# Patient Record
Sex: MALE | Race: WHITE | Employment: OTHER | URBAN - METROPOLITAN AREA
[De-identification: names, ages, dates, MRNs, and addresses within clinical notes are randomized per-mention and may not be internally consistent; named-entity substitution may affect disease eponyms.]

---

## 2017-12-15 ENCOUNTER — OFFICE VISIT (OUTPATIENT)
Dept: NEUROLOGY | Age: 62
End: 2017-12-15

## 2017-12-15 VITALS
SYSTOLIC BLOOD PRESSURE: 170 MMHG | RESPIRATION RATE: 18 BRPM | TEMPERATURE: 99.3 F | HEIGHT: 69 IN | BODY MASS INDEX: 38.36 KG/M2 | OXYGEN SATURATION: 97 % | WEIGHT: 259 LBS | DIASTOLIC BLOOD PRESSURE: 100 MMHG | HEART RATE: 81 BPM

## 2017-12-15 DIAGNOSIS — G47.33 OSA TREATED WITH BIPAP: Primary | ICD-10-CM

## 2017-12-15 DIAGNOSIS — I10 ESSENTIAL HYPERTENSION: ICD-10-CM

## 2017-12-15 NOTE — PROGRESS NOTES
[facility]             [address] [facility], [address] [facility], [address] [place] [address]      Yordan Parker is left handed [facility]  male who presents in evaluation of sleep disorder. Patient describes mid adult years  Onset of daytime sleepiness. He denies current difficulties  With ess=0; He reports daily usage of best device ever invented . He has lost weight     Patient describes no respiratory difficulties including apneas,snoring or gasping,no sleep disturbances  Patient reports his PCP has told him his blood pressures not a problem but patient continues to have markedly elevated pressures in this office discussed with him need to follow-up with PCP    Patient reports that he still feels it is a very good machine. His weight is stable he has retired download is provided without AHI but shows excellent usage on a mean of 8 hours 51 minutes-  Ess=1    Current Outpatient Prescriptions:     ALLOPURINOL PO, Take 300 mg by mouth daily. , Disp: , Rfl:     GEMFIBROZIL PO, Take 600 mg by mouth two (2) times a day., Disp: , Rfl:     OLMESARTAN MED/AMLODIPINE/HCTZ (TRIBENZOR PO), Take  by mouth See Admin Instructions. Indications: 40/10/25mg daily, Disp: , Rfl:     potassium chloride (KLOR-CON M20) 20 mEq tablet, Take  by mouth two (2) times a day., Disp: , Rfl:   Past Medical History:   Diagnosis Date    Essential hypertension     Sleep apnea      Former naren    Review of Systems:   Constitutional:  lost,  13  lbs. over past year   Eyes:  Negative blurred vision  CVS:  Negative chest pain  Pulm:  Negative shortness of breath  GI:  Negative nausea or vomiting  :  Negative nocturia  Musculoskeletal:  Negative significant joint pain at night  Skin:  Negative rashes  Neuro:  Negative dizziness   Psych:  Negative significant mood issues    Sleep Review of Systems: notable for Negative difficulty falling asleep;  Negative awakenings at night; Negative percieved regular dreaming; Negative nightmares; Negative early morning headaches; 0 afternoon naps per week; Negative memory problems; Negative concentration issues; Negative history of any automobile or occupational accidents due to daytime drowsiness. Physical Exam    Visit Vitals    BP (!) 170/100    Pulse 81    Temp 99.3 °F (37.4 °C) (Oral)    Resp 18    Ht 5' 9\" (1.753 m)    Wt 117.5 kg (259 lb)    SpO2 97%    BMI 38.25 kg/m2       General:  Well defined, nourished, and groomed individual in no acute distress. Neck: Supple, nontender, thyroid within normal limits, no JVD, no bruits, no pain   with resistance to active range of motion. Narrowing of post orpharynx ,tonsils uvula absent  Heart: Regular rate and rhythm, no murmurs, rub, or gallop. Normal S1S2. Lungs:  Clear to auscultation   Musculoskeletal:  Extremities revealed no edema, clubbing or cyanosis. Psych:  Good mood and normal affect    Neurologic Exam    Mental Status: The patient is awake, alert, and oriented x 3. Speech is fluent and memory appears to be intact, both long and short term. No aphasias or apraxias. Cranial Nerves: II - Visual fields are full to confrontation. Pupils are both equal and reactive to light and accommodation. III, IV, VI - Extraocular movements are intact and there is no nystagmus. V - Facial sensation is intact to pinprick and light touch. VII - Face is symmetrical.   VIII - Hearing is present. Motor: Tone is normal and symmetric. There is no pronator drift present. The strength is intact for all muscle groups tested in all four extremities. Coordination. Gait testing is normal     Downloadaverage use 8 hours 51 min no ahi  Assessment and Brianna Vallejoarely Esquivel [address] is a 60 y.o. left handed male whose history and physical are consistent with obstructive sleep apnea well controlled on auto bipap.   Katie Mcgarry who has risk factors including  Obesity, hypertension     Diagnoses and all orders for this visit:    1. STEFANIE treated with BiPAP    2. Essential hypertension    3. BMI 38.0-38.9,adult      Follow-up Disposition:  Return in about 1 year (around 12/15/2018). Reviewed  Need for download continue bipap at current settings Patient to continue to monitor bp and report findings to pcp since high when measured three time I spent 25 minutes with the patient in face-to-face consultation, of which greater than 50% was spent in counseling and coordination of care as described above.

## 2017-12-15 NOTE — MR AVS SNAPSHOT
Visit Information Date & Time Provider Department Dept. Phone Encounter #  
 12/15/2017  3:00 PM Kieran Constantino MD [facility] [address] at [address] [place] [address] [id number] Follow-up Instructions Return in about 1 year (around 12/15/2018). Follow-up and Disposition History Upcoming Health Maintenance Date Due Hepatitis C Screening 1955 DTaP/Tdap/Td series (1 - Tdap) 6/1/1975 FOBT Q 1 YEAR AGE 50-75 6/1/2004 ZOSTER VACCINE AGE 60> 4/1/2014 Influenza Age 5 to Adult 8/1/2017 Allergies as of 12/15/2017  Review Complete On: 12/15/2017 By: Ramya Johnson LPN No Known Allergies Current Immunizations  Never Reviewed No immunizations on file. Not reviewed this visit You Were Diagnosed With   
  
 Codes Comments STEFANIE treated with BiPAP    -  Primary ICD-10-CM: G47.33 
ICD-9-CM: 327.23 Essential hypertension     ICD-10-CM: I10 
ICD-9-CM: 401.9 BMI 38.0-38.9,adult     ICD-10-CM: W32.01 
ICD-9-CM: V85.38 Vitals BP Pulse Temp Resp Height(growth percentile) Weight(growth percentile) (!) 170/100 81 99.3 °F (37.4 °C) (Oral) 18 5' 9\" (1.753 m) 259 lb (117.5 kg) SpO2 BMI Smoking Status 97% 38.25 kg/m2 Former Smoker Vitals History BMI and BSA Data Body Mass Index Body Surface Area  
 38.25 kg/m 2 2.39 m 2 Your Updated Medication List  
  
   
This list is accurate as of: 12/15/17  3:11 PM.  Always use your most recent med list. ALLOPURINOL PO Take 300 mg by mouth daily. GEMFIBROZIL PO Take 600 mg by mouth two (2) times a day. KLOR-CON M20 20 mEq tablet Generic drug:  potassium chloride Take  by mouth two (2) times a day. TRIBENZOR PO Take  by mouth See Admin Instructions. Indications: 40/10/25mg daily Follow-up Instructions Return in about 1 year (around 12/15/2018). Introducing [facility]! Ruddy Newman introduces [employer] patient portal. Now you can access parts of your medical record, email your doctor's office, and request medication refills online. 1. In your internet browser, go to [email or web address] 2. Click on the First Time User? Click Here link in the Sign In box. You will see the New Member Sign Up page. 3. Enter your [employer] Access Code exactly as it appears below. You will not need to use this code after youve completed the sign-up process. If you do not sign up before the expiration date, you must request a new code. · [employer] Access Code: [email or web address] Expires: 3/15/2018  2:34 PM 
 
4. Enter the last four digits of your Social Security Number (xxxx) and Date of Birth (mm/dd/yyyy) as indicated and click Submit. You will be taken to the next sign-up page. 5. Create a [employer] ID. This will be your [employer] login ID and cannot be changed, so think of one that is secure and easy to remember. 6. Create a [employer] password. You can change your password at any time. 7. Enter your Password Reset Question and Answer. This can be used at a later time if you forget your password. 8. Enter your e-mail address. You will receive e-mail notification when new information is available in [address] 9. Click Sign Up. You can now view and download portions of your medical record. 10. Click the Download Summary menu link to download a portable copy of your medical information. If you have questions, please visit the Frequently Asked Questions section of the [employer] website. Remember, [employer] is NOT to be used for urgent needs. For medical emergencies, dial 911. Now available from your iPhone and Android! Please provide this summary of care documentation to your next provider. Your primary care clinician is listed as Jr Murray. If you have any questions after today's visit, please call [phone].

## 2019-02-08 ENCOUNTER — OFFICE VISIT (OUTPATIENT)
Dept: NEUROLOGY | Age: 64
End: 2019-02-08

## 2019-02-08 VITALS
TEMPERATURE: 99.1 F | OXYGEN SATURATION: 98 % | RESPIRATION RATE: 18 BRPM | BODY MASS INDEX: 35.31 KG/M2 | HEART RATE: 82 BPM | WEIGHT: 238.4 LBS | DIASTOLIC BLOOD PRESSURE: 60 MMHG | HEIGHT: 69 IN | SYSTOLIC BLOOD PRESSURE: 150 MMHG

## 2019-02-08 DIAGNOSIS — G47.33 OSA (OBSTRUCTIVE SLEEP APNEA): Primary | ICD-10-CM

## 2019-02-08 DIAGNOSIS — E66.01 MORBID OBESITY (HCC): ICD-10-CM

## 2019-02-08 RX ORDER — METOPROLOL SUCCINATE 50 MG/1
50 TABLET, EXTENDED RELEASE ORAL DAILY
COMMUNITY
End: 2019-09-05

## 2019-02-08 RX ORDER — LOSARTAN POTASSIUM AND HYDROCHLOROTHIAZIDE 25; 100 MG/1; MG/1
1 TABLET ORAL DAILY
COMMUNITY
End: 2019-08-15

## 2019-02-08 RX ORDER — AMLODIPINE BESYLATE 10 MG/1
10 TABLET ORAL DAILY
COMMUNITY

## 2019-02-08 NOTE — PROGRESS NOTES
2/8/2019 9:22 AM 
 
SSN: [id number] Subjective:   18-year-old male coming for chief complaint of follow-up of obstructive sleep apnea. The last time here was in December 2017 when he saw Dr. Lai Hart. At that time he had a download that showed an average use of 8 hours and 51 minutes, but that download did not have AHI information. Looking back on his notes to 2012, there is reference to a polysomnogram done back in 2008, results are not available. There are references to AHI is of 4-5 back in 2014 with subsequent repeated failed attempts to obtain downloads, 2016 there is a documentation of an AHI of 5.6 with use greater than 4 hours of 97%, and the settings documented years ago, but not recently, are \"25cwp 4 epap and 4 ps\". He comes reporting great response to BIPAP. His sleep is fantastic, he loves it, he has the best sleep. His machine is about 1.5 yrs old. Download through 1/2 [id number]/30 days use with median use of 8.9, max 12.2, median leak at 2.4, AHI of 3.8, on BIPAP auto with max IPAP 25, min EPAP at 4, PS of 4. Recalls he stop breathing 92 times per hour on his 2008 study. Social History Socioeconomic History  Marital status: UNKNOWN Spouse name: Not on file  Number of children: Not on file  Years of education: Not on file  Highest education level: Not on file Social Needs  Financial resource strain: Not on file  Food insecurity - worry: Not on file  Food insecurity - inability: Not on file  Transportation needs - medical: Not on file  Transportation needs - non-medical: Not on file Occupational History  Not on file Tobacco Use  Smoking status: Former Smoker  Smokeless tobacco: Never Used Substance and Sexual Activity  Alcohol use: No  
  Alcohol/week: 0.0 oz  Drug use: No  
 Sexual activity: Not on file Other Topics Concern  Not on file Social History Narrative  Not on file Family History Problem Relation Age of Onset  Cancer Mother  Cancer Father  Heart Disease Father Current Outpatient Medications Medication Sig Dispense Refill  losartan-hydroCHLOROthiazide (HYZAAR) 100-25 mg per tablet Take 1 Tab by mouth daily.  amLODIPine (NORVASC) 10 mg tablet Take  by mouth daily.  metoprolol succinate (TOPROL-XL) 50 mg XL tablet Take  by mouth daily.  ALLOPURINOL PO Take 300 mg by mouth daily.  GEMFIBROZIL PO Take 600 mg by mouth two (2) times a day.  potassium chloride (KLOR-CON M20) 20 mEq tablet Take  by mouth two (2) times a day.  OLMESARTAN MED/AMLODIPINE/HCTZ (TRIBENZOR PO) Take  by mouth See Admin Instructions. Indications: 40/10/25mg daily Past Medical History:  
Diagnosis Date  Essential hypertension  Sleep apnea Past Surgical History:  
Procedure Laterality Date  HX TONSILLECTOMY No Known Allergies Vital signs:   
Visit Vitals /60 (BP 1 Location: Left arm, BP Patient Position: Sitting) Pulse 82 Temp 99.1 °F (37.3 °C) Resp 18 Ht 5' 9\" (1.753 m) Wt 108.1 kg (238 lb 6.4 oz) SpO2 98% BMI 35.21 kg/m² Review of Systems:  
GENERAL: Denies fever or fatigue CARDIAC: No CP or SOB PULMONARY: No cough of SOB MUSCULOSKELETAL: No new joint pain NEURO: SEE HPI 
 
 
EXAM: Alert, in NAD. Heart is regular. Oriented x3, EOM's are full, PERRL, no facial asymmetries. Strength and tone are normal. DTR's +2, gait symmetric Assessment/Plan: Severe STEFANIE, remains well treated on auto BIPAP at current settings as above, great response, great compliance. Commended him on this. Advised him about wt loss. I will see him yearly or prn. PLEASE NOTE:  
Portions of this document may have been produced using voice recognition software. Unrecognized errors in transcription may be present. This note will not be viewable in [address]

## 2019-02-08 NOTE — PROGRESS NOTES
Chief Complaint Patient presents with  Sleep Study PHQ over the last two weeks 2/8/2019 Little interest or pleasure in doing things Not at all Feeling down, depressed, irritable, or hopeless Not at all Total Score PHQ 2 0

## 2019-04-12 RX ORDER — POTASSIUM CHLORIDE 20 MEQ/1
TABLET, EXTENDED RELEASE ORAL DAILY
COMMUNITY
End: 2019-08-15

## 2019-04-13 ENCOUNTER — ANESTHESIA EVENT (OUTPATIENT)
Dept: ENDOSCOPY | Age: 64
End: 2019-04-13
Payer: COMMERCIAL

## 2019-04-15 ENCOUNTER — ANESTHESIA (OUTPATIENT)
Dept: ENDOSCOPY | Age: 64
End: 2019-04-15
Payer: COMMERCIAL

## 2019-04-15 ENCOUNTER — HOSPITAL ENCOUNTER (OUTPATIENT)
Age: 64
Setting detail: OUTPATIENT SURGERY
Discharge: HOME | End: 2019-04-15
Attending: INTERNAL MEDICINE | Admitting: INTERNAL MEDICINE
Payer: COMMERCIAL

## 2019-04-15 VITALS
BODY MASS INDEX: 37.03 KG/M2 | SYSTOLIC BLOOD PRESSURE: 170 MMHG | HEIGHT: 69 IN | TEMPERATURE: 98.3 F | RESPIRATION RATE: 17 BRPM | HEART RATE: 54 BPM | WEIGHT: 250 LBS | DIASTOLIC BLOOD PRESSURE: 74 MMHG | OXYGEN SATURATION: 99 %

## 2019-04-15 PROCEDURE — 88305 TISSUE EXAM BY PATHOLOGIST: CPT

## 2019-04-15 PROCEDURE — 76040000019: Performed by: INTERNAL MEDICINE

## 2019-04-15 PROCEDURE — 77030013992 HC SNR POLYP ENDOSC BSC -B: Performed by: INTERNAL MEDICINE

## 2019-04-15 PROCEDURE — 76060000031 HC ANESTHESIA FIRST 0.5 HR: Performed by: INTERNAL MEDICINE

## 2019-04-15 PROCEDURE — 74011250636 HC RX REV CODE- 250/636: Performed by: NURSE ANESTHETIST, CERTIFIED REGISTERED

## 2019-04-15 RX ORDER — MAGNESIUM SULFATE 100 %
4 CRYSTALS MISCELLANEOUS AS NEEDED
Status: CANCELLED | OUTPATIENT
Start: 2019-04-15

## 2019-04-15 RX ORDER — FENTANYL CITRATE 50 UG/ML
50 INJECTION, SOLUTION INTRAMUSCULAR; INTRAVENOUS AS NEEDED
Status: CANCELLED | OUTPATIENT
Start: 2019-04-15

## 2019-04-15 RX ORDER — DEXTROSE 50 % IN WATER (D50W) INTRAVENOUS SYRINGE
25-50 AS NEEDED
Status: CANCELLED | OUTPATIENT
Start: 2019-04-15

## 2019-04-15 RX ORDER — PROPOFOL 10 MG/ML
INJECTION, EMULSION INTRAVENOUS AS NEEDED
Status: DISCONTINUED | OUTPATIENT
Start: 2019-04-15 | End: 2019-04-15 | Stop reason: HOSPADM

## 2019-04-15 RX ORDER — SODIUM CHLORIDE, SODIUM LACTATE, POTASSIUM CHLORIDE, CALCIUM CHLORIDE 600; 310; 30; 20 MG/100ML; MG/100ML; MG/100ML; MG/100ML
25 INJECTION, SOLUTION INTRAVENOUS CONTINUOUS
Status: DISCONTINUED | OUTPATIENT
Start: 2019-04-15 | End: 2019-04-15 | Stop reason: HOSPADM

## 2019-04-15 RX ORDER — ONDANSETRON 2 MG/ML
4 INJECTION INTRAMUSCULAR; INTRAVENOUS ONCE
Status: CANCELLED | OUTPATIENT
Start: 2019-04-15 | End: 2019-04-15

## 2019-04-15 RX ORDER — SODIUM CHLORIDE 0.9 % (FLUSH) 0.9 %
5-40 SYRINGE (ML) INJECTION EVERY 8 HOURS
Status: CANCELLED | OUTPATIENT
Start: 2019-04-15

## 2019-04-15 RX ORDER — SODIUM CHLORIDE, SODIUM LACTATE, POTASSIUM CHLORIDE, CALCIUM CHLORIDE 600; 310; 30; 20 MG/100ML; MG/100ML; MG/100ML; MG/100ML
75 INJECTION, SOLUTION INTRAVENOUS CONTINUOUS
Status: CANCELLED | OUTPATIENT
Start: 2019-04-15 | End: 2019-04-15

## 2019-04-15 RX ORDER — SODIUM CHLORIDE 0.9 % (FLUSH) 0.9 %
5-40 SYRINGE (ML) INJECTION AS NEEDED
Status: CANCELLED | OUTPATIENT
Start: 2019-04-15

## 2019-04-15 RX ADMIN — PROPOFOL 100 MG: 10 INJECTION, EMULSION INTRAVENOUS at 09:29

## 2019-04-15 RX ADMIN — FAMOTIDINE 20 MG: 10 INJECTION INTRAVENOUS at 09:06

## 2019-04-15 RX ADMIN — PROPOFOL 75 MG: 10 INJECTION, EMULSION INTRAVENOUS at 09:33

## 2019-04-15 RX ADMIN — SODIUM CHLORIDE, SODIUM LACTATE, POTASSIUM CHLORIDE, AND CALCIUM CHLORIDE 25 ML/HR: 600; 310; 30; 20 INJECTION, SOLUTION INTRAVENOUS at 09:06

## 2019-04-15 RX ADMIN — PROPOFOL 100 MG: 10 INJECTION, EMULSION INTRAVENOUS at 09:27

## 2019-04-15 NOTE — ANESTHESIA PREPROCEDURE EVALUATION
Relevant Problems No relevant active problems Anesthetic History Review of Systems / Medical History Patient summary reviewed Pulmonary Smoker Neuro/Psych Cardiovascular Hypertension GI/Hepatic/Renal 
  
 
 
 
 
 
 Endo/Other Obesity Other Findings Anesthetic Plan ASA: 2 Anesthesia type: MAC Induction: Intravenous Anesthetic plan and risks discussed with: Patient

## 2019-04-15 NOTE — H&P
[email or web address]  [phone]    GASTROENTEROLOGY Pre-Procedure H and P      Impression/Plan:   1.  This patient is consented for an EGD and colonoscopy for LIZZETH       Chief Complaint: LIZZETH    HPI:  Hansa Valencia is a 58 y.o. male who is being is having an EGD and colonoscopy LIZZETH  PMH:   Past Medical History:   Diagnosis Date    Essential hypertension     Sleep apnea     on cpap       PSH:   Past Surgical History:   Procedure Laterality Date    HX TONSILLECTOMY         Social HX:   Social History     Socioeconomic History    Marital status:      Spouse name: Not on file    Number of children: Not on file    Years of education: Not on file    Highest education level: Not on file   Occupational History    Not on file   Social Needs    Financial resource strain: Not on file    Food insecurity:     Worry: Not on file     Inability: Not on file    Transportation needs:     Medical: Not on file     Non-medical: Not on file   Tobacco Use    Smoking status: Former Smoker    Smokeless tobacco: Never Used   Substance and Sexual Activity    Alcohol use: No     Alcohol/week: 0.0 oz    Drug use: No    Sexual activity: Not on file   Lifestyle    Physical activity:     Days per week: Not on file     Minutes per session: Not on file    Stress: Not on file   Relationships    Social connections:     Talks on phone: Not on file     Gets together: Not on file     Attends [employer] service: Not on file     Active member of club or organization: Not on file     Attends meetings of clubs or organizations: Not on file     Relationship status: Not on file    Intimate partner violence:     Fear of current or ex partner: Not on file     Emotionally abused: Not on file     Physically abused: Not on file     Forced sexual activity: Not on file   Other Topics Concern    Not on file   Social History Narrative    Not on file       FHX:   Family History   Problem Relation Age of Onset    Cancer Mother     Cancer Father  Heart Disease Father        Allergy:   No Known Allergies    Home Medications:     Medications Prior to Admission   Medication Sig    potassium chloride (K-DUR, KLOR-CON) 20 mEq tablet Take  by mouth daily.  losartan-hydroCHLOROthiazide (HYZAAR) 100-25 mg per tablet Take 1 Tab by mouth daily.  amLODIPine (NORVASC) 10 mg tablet Take  by mouth daily.  metoprolol succinate (TOPROL-XL) 50 mg XL tablet Take  by mouth daily.  ALLOPURINOL PO Take 300 mg by mouth daily.  GEMFIBROZIL PO Take 600 mg by mouth two (2) times a day. Review of Systems:     Constitutional: No fevers, chills, weight loss, fatigue. Skin: No rashes, pruritis, jaundice, ulcerations, erythema. HENT: No headaches, nosebleeds, sinus pressure, rhinorrhea, sore throat. Eyes: No visual changes, blurred vision, eye pain, photophobia, jaundice. Cardiovascular: No chest pain, heart palpitations. Respiratory: No cough, SOB, wheezing, chest discomfort, orthopnea. Gastrointestinal:    Genitourinary: No dysuria, bleeding, discharge, pyuria. Musculoskeletal: No weakness, arthralgias, wasting. Endo: No sweats. Heme: No bruising, easy bleeding. Allergies: As noted. Neurological: Cranial nerves intact. Alert and oriented. Gait not assessed. Psychiatric:  No anxiety, depression, hallucinations. Visit Vitals  Ht 5' 9\" (1.753 m)   Wt 113.4 kg (250 lb)   BMI 36.92 kg/m²       Physical Assessment:     constitutional: appearance: well developed, well nourished, normal habitus, no deformities, in no acute distress. skin: inspection: no rashes, ulcers, icterus or other lesions; no clubbing or telangiectasias. palpation: no induration or subcutaneos nodules. eyes: inspection: normal conjunctivae and lids; no jaundice pupils: normal  ENMT: mouth: normal oral mucosa,lips and gums; good dentition. oropharynx: normal tongue, hard and soft palate; posterior pharynx without erithema, exudate or lesions.    neck: thyroid: normal size, consistency and position; no masses or tenderness. respiratory: effort: normal chest excursion; no intercostal retraction or accessory muscle use. cardiovascular: abdominal aorta: normal size and position; no bruits. palpation: PMI of normal size and position; normal rhythm; no thrill or murmurs. abdominal: abdomen: normal consistency; no tenderness or masses. hernias: no hernias appreciated. liver: normal size and consistency. spleen: not palpable. rectal: hemoccult/guaiac: not performed. musculoskeletal: digits and nails: no clubbing, cyanosis, petechiae or other inflammatory conditions. gait: normal gait and station head and neck: normal range of motion; no pain, crepitation or contracture. spine/ribs/pelvis: normal range of motion; no pain, deformity or contracture. neurologic: cranial nerves: II-XII normal.   psychiatric: judgement/insight: within normal limits. memory: within normal limits for recent and remote events. mood and affect: no evidence of depression, anxiety or agitation. orientation: oriented to time, space and person. Basic Metabolic Profile   No results for input(s): NA, K, CL, CO2, BUN, GLU, CA, MG, PHOS in the last 72 hours. No lab exists for component: CREAT      CBC w/Diff    No results for input(s): WBC, RBC, HGB, HCT, MCV, MCH, MCHC, RDW, PLT, HGBEXT, HCTEXT, PLTEXT in the last 72 hours. No lab exists for component: MPV No results for input(s): GRANS, LYMPH, EOS, PRO, MYELO, METAS, BLAST in the last 72 hours. No lab exists for component: MONO, BASO     Hepatic Function   No results for input(s): ALB, TP, TBILI, GPT, SGOT, AP, AML, LPSE in the last 72 hours. No lab exists for component: DBILI     Coags   No results for input(s): PTP, INR, APTT in the last 72 hours.     No lab exists for component: INREXT        Rosalia Alfonso MD  [facility] [place] [id number], [address] [facility]  Cell: [phone]  Direct pager: [phone]  [email or web address]  [email or web address]

## 2019-04-15 NOTE — ANESTHESIA POSTPROCEDURE EVALUATION
Procedure(s): UPPER ENDOSCOPY 
COLONOSCOPY. MAC Anesthesia Post Evaluation Multimodal analgesia: multimodal analgesia used between 6 hours prior to anesthesia start to PACU discharge Patient location during evaluation: bedside Patient participation: complete - patient participated Level of consciousness: awake and alert Pain score: 0 Airway patency: patent Anesthetic complications: no 
Cardiovascular status: acceptable Respiratory status: acceptable Hydration status: acceptable Vitals Value Taken Time /81 4/15/2019  9:47 AM  
Temp Pulse 58 4/15/2019  9:47 AM  
Resp 13 4/15/2019  9:47 AM  
SpO2 98 % 4/15/2019  9:47 AM  
Vitals shown include unvalidated device data.

## 2019-04-15 NOTE — DISCHARGE INSTRUCTIONS
Colonoscopy: What to Expect at [address] [place] [address]  After you have a colonoscopy, you will stay at the clinic for 1 to 2 hours until the medicines wear off. Then you can go home. But you will need to arrange for a ride. Your doctor will tell you when you can eat and do your other usual activities. Your doctor will talk to you about when you will need your next colonoscopy. Your doctor can help you decide how often you need to be checked. This will depend on the results of your test and your risk for colorectal cancer. After the test, you may be bloated or have gas pains. You may need to pass gas. If a biopsy was done or a polyp was removed, you may have streaks of blood in your stool (feces) for a few days. This care sheet gives you a general idea about how long it will take for you to recover. But each person recovers at a different pace. Follow the steps below to get better as quickly as possible. How can you care for yourself at home? Activity  · Rest when you feel tired. · You can do your normal activities when it feels okay to do so. Diet  · Follow your doctor's directions for eating. · Unless your doctor has told you not to, drink plenty of fluids. This helps to replace the fluids that were lost during the colon prep. · Do not drink alcohol. Medicines  · If polyps were removed or a biopsy was done during the test, your doctor may tell you not to take aspirin or other anti-inflammatory medicines for a few days. These include ibuprofen (Advil, Motrin) and naproxen (Aleve). Other instructions  · For your safety, do not drive or operate machinery until the medicine wears off and you can think clearly. Your doctor may tell you not to drive or operate machinery until the day after your test.  · Do not sign legal documents or make major decisions until the medicine wears off and you can think clearly. The anesthesia can make it hard for you to fully understand what you are agreeing to.   Follow-up care is a key part of your treatment and safety. Be sure to make and go to all appointments, and call your doctor if you are having problems. It's also a good idea to know your test results and keep a list of the medicines you take. When should you call for help? Call 911 anytime you think you may need emergency care. For example, call if:  · You passed out (lost consciousness). · You pass maroon or bloody stools. · You have severe belly pain. Call your doctor now or seek immediate medical care if:  · Your stools are black and tarlike. · Your stools have streaks of blood, but you did not have a biopsy or any polyps removed. · You have belly pain, or your belly is swollen and firm. · You vomit. · You have a fever. · You are very dizzy. Watch closely for changes in your health, and be sure to contact your doctor if you have any problems. Where can you learn more? Go to Sensser.be  Enter E264 in the search box to learn more about \"Colonoscopy: What to Expect at Home. \"   © 6552-9071 [employer]. Care instructions adapted under license by [employer] (which disclaims liability or warranty for this information). This care instruction is for use with your licensed healthcare professional. If you have questions about a medical condition or this instruction, always ask your healthcare professional. [place] [address] any warranty or liability for your use of this information. Content Version: 90.5.834892; Current as of: November 14, 2014      DISCHARGE SUMMARY from Nurse     POST-PROCEDURE INSTRUCTIONS:    Call your Physician if you:  ? Observe any excess bleeding. ? Develop a temperature over 100.5o F.  ? Experience abdominal, shoulder or chest pain. ? Notice any signs of decreased circulation or nerve impairment to an extremity such as a change in color, persistent numbness, tingling, coldness or increase in pain. ?  Vomit blood or you have nausea and vomiting lasting longer than 4 hours. ? Are unable to take medications. ? Are unable to urinate within 8 hours after discharge following general anesthesia or intravenous sedation. For the next 24 hours after receiving general anesthesia or intravenous sedation, or while taking prescription Narcotics, limit your activities:  ? Do NOT drive a motor vehicle, operate hazard machinery or power tools, or perform tasks that require coordination. The medication you received during your procedure may have some effect on your mental awareness. ? Do NOT make important personal or business decisions. The medication you received during your procedure may have some effect on your mental awareness. ? Do NOT drink alcoholic beverages. These drinks do not mix well with the medications that have been given to you. ? Upon discharge from the hospital, you must be accompanied by a responsible adult. ? Resume your diet as directed by your physician. ? Resume medications as your physician has prescribed. ? Please give a list of your current medications to your Primary Care Provider. ? Please update this list whenever your medications are discontinued, doses are changed, or new medications (including over-the-counter products) are added. ? Please carry medication information at all times in case of emergency situations. These are general instructions for a healthy lifestyle:    No smoking/ No tobacco products/ Avoid exposure to second hand smoke.  Surgeon General's Warning:  Quitting smoking now greatly reduces serious risk to your health. Obesity, smoking, and a sedentary lifestyle greatly increase your risk for illness.    A healthy diet, regular physical exercise & weight monitoring are important for maintaining a healthy lifestyle   You may be retaining fluid if you have a history of heart failure or if you experience any of the following symptoms:  Weight gain of 3 pounds or more overnight or 5 pounds in a week, increased swelling in our hands or feet or shortness of breath while lying flat in bed. Please call your doctor as soon as you notice any of these symptoms; do not wait until your next office visit. Recognize signs and symptoms of STROKE:  F  -  Face looks uneven  A  -  Arms unable to move or move unevenly  S  -  Speech slurred or non-existent  T  -  Time to call 911 - as soon as signs and symptoms begin - DO NOT go back to bed or wait to see If you get better - TIME IS BRAIN. Colorectal Screening   Colorectal cancer almost always develops from precancerous polyps (abnormal growths) in the colon or rectum. Screening tests can find precancerous polyps, so that they can be removed before they turn into cancer. Screening tests can also find colorectal cancer early, when treatment works best.  Mejía Speak with your physician about when you should begin screening and how often you should be tested. Additional Information    If you have questions, please call [phone]. Remember, [employer] is NOT to be used for urgent needs. For medical emergencies, dial 911. Discharge information has been reviewed with the patient. The patient verbalized understanding. Patient Education        Upper GI Endoscopy: What to Expect at Home  Your Recovery  After you have an endoscopy, you will stay at the hospital or clinic for 1 to 2 hours. This will allow the medicine to wear off. You will be able to go home after your doctor or nurse checks to make sure you are not having any problems. You may have to stay overnight if you had treatment during the test. You may have a sore throat for a day or two after the test.  This care sheet gives you a general idea about what to expect after the test.  How can you care for yourself at home? Activity  · Rest as much as you need to after you go home.   · You should be able to go back to your usual activities the day after the test.  Diet  · Follow your doctor's directions for eating after the test.  · Drink plenty of fluids (unless your doctor has told you not to). Medications  · If you have a sore throat the day after the test, use an over-the-counter spray to numb your throat. Follow-up care is a key part of your treatment and safety. Be sure to make and go to all appointments, and call your doctor if you are having problems. It's also a good idea to know your test results and keep a list of the medicines you take. When should you call for help? Call 911 anytime you think you may need emergency care. For example, call if:    · You passed out (loses consciousness).     · You have trouble breathing.     · You pass maroon or bloody stools.    Call your doctor now or seek immediate medical care if:    · You have pain that does not get better after your take pain medicine.     · You have new or worse belly pain.     · You have blood in your stools.     · You are sick to your stomach and cannot keep fluids down.     · You have a fever.     · You cannot pass stools or gas.    Watch closely for changes in your health, and be sure to contact your doctor if:    · Your throat still hurts after a day or two.     · You do not get better as expected. Where can you learn more? Go to http://tereza-freddie.info/. Enter (93) 291-250 in the search box to learn more about \"Upper GI Endoscopy: What to Expect at Home. \"  Current as of: March 27, 2018  Content Version: 11.9  © 4573-2636 [employer]. Care instructions adapted under license by [employer] (which disclaims liability or warranty for this information). If you have questions about a medical condition or this instruction, always ask your healthcare professional. Arlene Salter any warranty or liability for your use of this information.

## 2019-06-20 ENCOUNTER — OFFICE VISIT (OUTPATIENT)
Dept: NEUROLOGY | Age: 64
End: 2019-06-20

## 2019-06-20 VITALS
TEMPERATURE: 98.8 F | HEIGHT: 69 IN | BODY MASS INDEX: 36.73 KG/M2 | HEART RATE: 73 BPM | SYSTOLIC BLOOD PRESSURE: 220 MMHG | WEIGHT: 248 LBS | DIASTOLIC BLOOD PRESSURE: 90 MMHG | RESPIRATION RATE: 16 BRPM | OXYGEN SATURATION: 96 %

## 2019-06-20 DIAGNOSIS — I10 ESSENTIAL HYPERTENSION: ICD-10-CM

## 2019-06-20 DIAGNOSIS — G47.33 OSA (OBSTRUCTIVE SLEEP APNEA): Primary | ICD-10-CM

## 2019-06-20 DIAGNOSIS — E66.01 MORBID OBESITY (HCC): ICD-10-CM

## 2019-06-20 NOTE — PROGRESS NOTES
6/20/2019 9:25 AM    SSN: [id number]    Subjective:   72 y/o male coming for f/u of STEFANIE. I saw him in February, he was doing great, compliant with treatment, and download looked excellent. He remains compliant on auto BIPAP. Download through 6/5 showed AHI of 4.0, on auto BIPAP max IPAP at 25 and min IPAP at 5, median 9.2, aver use of 8:51, 30/30 days use. BP initially was 220/90. Rechecked at 202/90.      Social History     Socioeconomic History    Marital status: UNKNOWN     Spouse name: Not on file    Number of children: Not on file    Years of education: Not on file    Highest education level: Not on file   Occupational History    Not on file   Social Needs    Financial resource strain: Not on file    Food insecurity:     Worry: Not on file     Inability: Not on file    Transportation needs:     Medical: Not on file     Non-medical: Not on file   Tobacco Use    Smoking status: Former Smoker    Smokeless tobacco: Never Used   Substance and Sexual Activity    Alcohol use: No     Alcohol/week: 0.0 oz    Drug use: No    Sexual activity: Not on file   Lifestyle    Physical activity:     Days per week: Not on file     Minutes per session: Not on file    Stress: Not on file   Relationships    Social connections:     Talks on phone: Not on file     Gets together: Not on file     Attends Gnosticist service: Not on file     Active member of club or organization: Not on file     Attends meetings of clubs or organizations: Not on file     Relationship status: Not on file    Intimate partner violence:     Fear of current or ex partner: Not on file     Emotionally abused: Not on file     Physically abused: Not on file     Forced sexual activity: Not on file   Other Topics Concern    Not on file   Social History Narrative    Not on file       Family History   Problem Relation Age of Onset    Cancer Mother     Cancer Father     Heart Disease Father        Current Outpatient Medications   Medication Sig Dispense Refill    potassium chloride (K-DUR, KLOR-CON) 20 mEq tablet Take  by mouth daily.  losartan-hydroCHLOROthiazide (HYZAAR) 100-25 mg per tablet Take 1 Tab by mouth daily.  amLODIPine (NORVASC) 10 mg tablet Take  by mouth daily.  metoprolol succinate (TOPROL-XL) 50 mg XL tablet Take  by mouth daily.  ALLOPURINOL PO Take 300 mg by mouth daily.  GEMFIBROZIL PO Take 600 mg by mouth two (2) times a day. Past Medical History:   Diagnosis Date    Essential hypertension     Sleep apnea     on cpap       Past Surgical History:   Procedure Laterality Date    COLONOSCOPY N/A 4/15/2019    COL[facility] performed [facility] at [facility] ENDOSCOPY    HX TONSILLECTOMY         No Known Allergies    Vital signs:    Visit Vitals  BP (!) 220/90 (BP 1 Location: Left arm, BP Patient Position: Sitting)   Pulse 73   Temp 98.8 °F (37.1 °C) (Oral)   Resp 16   Ht 5' 9\" (1.753 m)   Wt 112.5 kg (248 lb)   SpO2 96%   BMI 36.62 kg/m²       Review of Systems:   GENERAL: Denies fever or fatigue  CARDIAC: No CP or SOB  PULMONARY: No cough of SOB  MUSCULOSKELETAL: No new joint pain  NEURO: SEE HPI      EXAM: Alert, in NAD. Heart is regular. Oriented x3, EOM's are full, PERRL, no facial asymmetries. Strength and tone are normal. DTR's +2, gait symmetric           Assessment/Plan: His STEFANIE is well treated. I will cont current tx, update supplies. However, his BP is malignantly high. I talked with Lico Kirby in conference with pt in room. We recommended going to the ER, which he did not want to do, so agreed he will see Cathy Patel today to consider adjustments and w/u including for renal artery stenosis and pheochromocytoma. For his STEFANIE, he may return in 6 months. 25/40 mins in counseling and coordination of care. PLEASE NOTE:   Portions of this document may have been produced using voice recognition software.  Unrecognized errors in transcription may be present. This note will not be viewable in [address]

## 2019-06-20 NOTE — PROGRESS NOTES
ROOM # 2    [address] [place] [address] presents today for   Chief Complaint   Patient presents with    Follow-up    Sleep Apnea         Visit Vitals  BP (!) 220/90 (BP 1 Location: Left arm, BP Patient Position: Sitting)   Pulse 73   Temp 98.8 °F (37.1 °C) (Oral)   Resp 16   Ht 5' 9\" (1.753 m)   Wt 248 lb (112.5 kg)   SpO2 96%   BMI 36.62 kg/m²         Advance Directive:  1. Do you have an advance directive in place? Patient Reply: No    2. If not, would you like material regarding how to put one in place? Patient Reply: No    Coordination of Care:  1. Have you been to the ER, urgent care clinic since your last visit? Hospitalized since your last visit? No    Informed MD about patients bp.

## 2019-08-11 ENCOUNTER — HOSPITAL ENCOUNTER (OUTPATIENT)
Dept: SLEEP MEDICINE | Age: 64
Discharge: HOME | DRG: 062 | End: 2019-08-11
Payer: MEDICARE

## 2019-08-11 DIAGNOSIS — G47.33 OSA (OBSTRUCTIVE SLEEP APNEA): ICD-10-CM

## 2019-08-11 PROCEDURE — 95811 POLYSOM 6/>YRS CPAP 4/> PARM: CPT

## 2019-08-11 PROCEDURE — 95810 POLYSOM 6/> YRS 4/> PARAM: CPT

## 2019-08-12 ENCOUNTER — APPOINTMENT (OUTPATIENT)
Dept: MRI IMAGING | Age: 64
DRG: 062 | End: 2019-08-12
Attending: PHYSICIAN ASSISTANT
Payer: MEDICARE

## 2019-08-12 ENCOUNTER — HOSPITAL ENCOUNTER (INPATIENT)
Age: 64
LOS: 3 days | Discharge: NURSING, REHAB OR LONG-TERM CARE | DRG: 062 | End: 2019-08-15
Attending: EMERGENCY MEDICINE | Admitting: INTERNAL MEDICINE
Payer: MEDICARE

## 2019-08-12 ENCOUNTER — APPOINTMENT (OUTPATIENT)
Dept: CT IMAGING | Age: 64
DRG: 062 | End: 2019-08-12
Attending: EMERGENCY MEDICINE
Payer: MEDICARE

## 2019-08-12 VITALS — DIASTOLIC BLOOD PRESSURE: 91 MMHG | HEART RATE: 77 BPM | SYSTOLIC BLOOD PRESSURE: 176 MMHG

## 2019-08-12 DIAGNOSIS — I63.9 CEREBROVASCULAR ACCIDENT (CVA), UNSPECIFIED MECHANISM (HCC): Primary | ICD-10-CM

## 2019-08-12 LAB
ANION GAP SERPL CALC-SCNC: 11 MMOL/L (ref 3–18)
ATRIAL RATE: 81 BPM
BASOPHILS # BLD: 0.1 K/UL (ref 0–0.1)
BASOPHILS NFR BLD: 1 % (ref 0–2)
BUN SERPL-MCNC: 26 MG/DL (ref 7–18)
BUN/CREAT SERPL: 17 (ref 12–20)
CALCIUM SERPL-MCNC: 9.2 MG/DL (ref 8.5–10.1)
CALCULATED P AXIS, ECG09: 23 DEGREES
CALCULATED R AXIS, ECG10: 7 DEGREES
CALCULATED T AXIS, ECG11: 26 DEGREES
CHLORIDE SERPL-SCNC: 106 MMOL/L (ref 100–111)
CK MB CFR SERPL CALC: 0.7 % (ref 0–4)
CK MB SERPL-MCNC: 2.4 NG/ML (ref 5–25)
CK SERPL-CCNC: 355 U/L (ref 39–308)
CO2 SERPL-SCNC: 26 MMOL/L (ref 21–32)
CREAT SERPL-MCNC: 1.54 MG/DL (ref 0.6–1.3)
DIAGNOSIS, 93000: NORMAL
DIFFERENTIAL METHOD BLD: ABNORMAL
EOSINOPHIL # BLD: 0.1 K/UL (ref 0–0.4)
EOSINOPHIL NFR BLD: 1 % (ref 0–5)
ERYTHROCYTE [DISTWIDTH] IN BLOOD BY AUTOMATED COUNT: 14.3 % (ref 11.6–14.5)
GLUCOSE BLD STRIP.AUTO-MCNC: 174 MG/DL (ref 70–110)
GLUCOSE SERPL-MCNC: 154 MG/DL (ref 74–99)
HCT VFR BLD AUTO: 33.6 % (ref 36–48)
HGB BLD-MCNC: 11.1 G/DL (ref 13–16)
LYMPHOCYTES # BLD: 1.4 K/UL (ref 0.9–3.6)
LYMPHOCYTES NFR BLD: 15 % (ref 21–52)
MAGNESIUM SERPL-MCNC: 2.3 MG/DL (ref 1.6–2.6)
MCH RBC QN AUTO: 27.3 PG (ref 24–34)
MCHC RBC AUTO-ENTMCNC: 33 G/DL (ref 31–37)
MCV RBC AUTO: 82.8 FL (ref 74–97)
MONOCYTES # BLD: 0.5 K/UL (ref 0.05–1.2)
MONOCYTES NFR BLD: 6 % (ref 3–10)
NEUTS SEG # BLD: 7.1 K/UL (ref 1.8–8)
NEUTS SEG NFR BLD: 77 % (ref 40–73)
P-R INTERVAL, ECG05: 288 MS
PHOSPHATE SERPL-MCNC: 3 MG/DL (ref 2.5–4.9)
PLATELET # BLD AUTO: 291 K/UL (ref 135–420)
PMV BLD AUTO: 11.3 FL (ref 9.2–11.8)
POTASSIUM SERPL-SCNC: 2.4 MMOL/L (ref 3.5–5.5)
Q-T INTERVAL, ECG07: 436 MS
QRS DURATION, ECG06: 116 MS
QTC CALCULATION (BEZET), ECG08: 506 MS
RBC # BLD AUTO: 4.06 M/UL (ref 4.7–5.5)
SODIUM SERPL-SCNC: 143 MMOL/L (ref 136–145)
TROPONIN I SERPL-MCNC: 0.04 NG/ML (ref 0–0.04)
VENTRICULAR RATE, ECG03: 81 BPM
WBC # BLD AUTO: 9.1 K/UL (ref 4.6–13.2)

## 2019-08-12 PROCEDURE — 74011636320 HC RX REV CODE- 636/320: Performed by: INTERNAL MEDICINE

## 2019-08-12 PROCEDURE — 83735 ASSAY OF MAGNESIUM: CPT

## 2019-08-12 PROCEDURE — 74011000258 HC RX REV CODE- 258: Performed by: EMERGENCY MEDICINE

## 2019-08-12 PROCEDURE — 92610 EVALUATE SWALLOWING FUNCTION: CPT

## 2019-08-12 PROCEDURE — 77030011256 HC DRSG MEPILEX <16IN NO BORD MOLN -A

## 2019-08-12 PROCEDURE — 74011000258 HC RX REV CODE- 258: Performed by: INTERNAL MEDICINE

## 2019-08-12 PROCEDURE — 74011250636 HC RX REV CODE- 250/636: Performed by: INTERNAL MEDICINE

## 2019-08-12 PROCEDURE — 70551 MRI BRAIN STEM W/O DYE: CPT

## 2019-08-12 PROCEDURE — 94660 CPAP INITIATION&MGMT: CPT

## 2019-08-12 PROCEDURE — 74011000250 HC RX REV CODE- 250: Performed by: EMERGENCY MEDICINE

## 2019-08-12 PROCEDURE — 82550 ASSAY OF CK (CPK): CPT

## 2019-08-12 PROCEDURE — 94762 N-INVAS EAR/PLS OXIMTRY CONT: CPT

## 2019-08-12 PROCEDURE — 70450 CT HEAD/BRAIN W/O DYE: CPT

## 2019-08-12 PROCEDURE — 93005 ELECTROCARDIOGRAM TRACING: CPT

## 2019-08-12 PROCEDURE — 74011250636 HC RX REV CODE- 250/636

## 2019-08-12 PROCEDURE — 82962 GLUCOSE BLOOD TEST: CPT

## 2019-08-12 PROCEDURE — 74011250636 HC RX REV CODE- 250/636: Performed by: EMERGENCY MEDICINE

## 2019-08-12 PROCEDURE — 84100 ASSAY OF PHOSPHORUS: CPT

## 2019-08-12 PROCEDURE — 70496 CT ANGIOGRAPHY HEAD: CPT

## 2019-08-12 PROCEDURE — 3E03317 INTRODUCTION OF OTHER THROMBOLYTIC INTO PERIPHERAL VEIN, PERCUTANEOUS APPROACH: ICD-10-PCS | Performed by: EMERGENCY MEDICINE

## 2019-08-12 PROCEDURE — 74011250637 HC RX REV CODE- 250/637: Performed by: EMERGENCY MEDICINE

## 2019-08-12 PROCEDURE — 96374 THER/PROPH/DIAG INJ IV PUSH: CPT

## 2019-08-12 PROCEDURE — 96375 TX/PRO/DX INJ NEW DRUG ADDON: CPT

## 2019-08-12 PROCEDURE — 5A09357 ASSISTANCE WITH RESPIRATORY VENTILATION, LESS THAN 24 CONSECUTIVE HOURS, CONTINUOUS POSITIVE AIRWAY PRESSURE: ICD-10-PCS | Performed by: INTERNAL MEDICINE

## 2019-08-12 PROCEDURE — 77030037878 HC DRSG MEPILEX >48IN BORD MOLN -B

## 2019-08-12 PROCEDURE — 36415 COLL VENOUS BLD VENIPUNCTURE: CPT

## 2019-08-12 PROCEDURE — 65610000006 HC RM INTENSIVE CARE

## 2019-08-12 PROCEDURE — 99285 EMERGENCY DEPT VISIT HI MDM: CPT

## 2019-08-12 PROCEDURE — 85025 COMPLETE CBC W/AUTO DIFF WBC: CPT

## 2019-08-12 PROCEDURE — 84132 ASSAY OF SERUM POTASSIUM: CPT

## 2019-08-12 PROCEDURE — 92526 ORAL FUNCTION THERAPY: CPT

## 2019-08-12 RX ORDER — SODIUM CHLORIDE 0.9 % (FLUSH) 0.9 %
5-40 SYRINGE (ML) INJECTION AS NEEDED
Status: DISCONTINUED | OUTPATIENT
Start: 2019-08-12 | End: 2019-08-13

## 2019-08-12 RX ORDER — POTASSIUM CHLORIDE 1.5 G/1.77G
40 POWDER, FOR SOLUTION ORAL
Status: COMPLETED | OUTPATIENT
Start: 2019-08-12 | End: 2019-08-12

## 2019-08-12 RX ORDER — POTASSIUM CHLORIDE 1.5 G/1.77G
20 POWDER, FOR SOLUTION ORAL ONCE
Status: DISCONTINUED | OUTPATIENT
Start: 2019-08-12 | End: 2019-08-12

## 2019-08-12 RX ORDER — SODIUM CHLORIDE 0.9 % (FLUSH) 0.9 %
5-40 SYRINGE (ML) INJECTION EVERY 8 HOURS
Status: DISCONTINUED | OUTPATIENT
Start: 2019-08-12 | End: 2019-08-13

## 2019-08-12 RX ORDER — LABETALOL HCL 20 MG/4 ML
10 SYRINGE (ML) INTRAVENOUS ONCE
Status: COMPLETED | OUTPATIENT
Start: 2019-08-12 | End: 2019-08-12

## 2019-08-12 RX ORDER — SODIUM CHLORIDE 9 MG/ML
50 INJECTION, SOLUTION INTRAVENOUS ONCE
Status: COMPLETED | OUTPATIENT
Start: 2019-08-12 | End: 2019-08-12

## 2019-08-12 RX ORDER — POTASSIUM CHLORIDE 1.5 G/1.77G
60 POWDER, FOR SOLUTION ORAL
Status: DISCONTINUED | OUTPATIENT
Start: 2019-08-12 | End: 2019-08-12 | Stop reason: SDUPTHER

## 2019-08-12 RX ORDER — SODIUM CHLORIDE 0.9 % (FLUSH) 0.9 %
5-40 SYRINGE (ML) INJECTION AS NEEDED
Status: DISCONTINUED | OUTPATIENT
Start: 2019-08-12 | End: 2019-08-15 | Stop reason: HOSPADM

## 2019-08-12 RX ORDER — SODIUM CHLORIDE 0.9 % (FLUSH) 0.9 %
5-40 SYRINGE (ML) INJECTION EVERY 8 HOURS
Status: DISCONTINUED | OUTPATIENT
Start: 2019-08-12 | End: 2019-08-15 | Stop reason: HOSPADM

## 2019-08-12 RX ORDER — LABETALOL HCL 20 MG/4 ML
10 SYRINGE (ML) INTRAVENOUS
Status: DISCONTINUED | OUTPATIENT
Start: 2019-08-12 | End: 2019-08-13

## 2019-08-12 RX ADMIN — POTASSIUM CHLORIDE 40 MEQ: 1.5 POWDER, FOR SOLUTION ORAL at 10:52

## 2019-08-12 RX ADMIN — LABETALOL 20 MG/4 ML (5 MG/ML) INTRAVENOUS SYRINGE 10 MG: at 08:39

## 2019-08-12 RX ADMIN — ALTEPLASE 81 MG: KIT at 08:45

## 2019-08-12 RX ADMIN — Medication 10 ML: at 11:32

## 2019-08-12 RX ADMIN — POTASSIUM CHLORIDE: 2 INJECTION, SOLUTION, CONCENTRATE INTRAVENOUS at 13:18

## 2019-08-12 RX ADMIN — Medication 10 ML: at 15:32

## 2019-08-12 RX ADMIN — Medication 10 ML: at 22:14

## 2019-08-12 RX ADMIN — POTASSIUM CHLORIDE: 2 INJECTION, SOLUTION, CONCENTRATE INTRAVENOUS at 15:32

## 2019-08-12 RX ADMIN — POTASSIUM CHLORIDE: 2 INJECTION, SOLUTION, CONCENTRATE INTRAVENOUS at 16:34

## 2019-08-12 RX ADMIN — POTASSIUM CHLORIDE: 2 INJECTION, SOLUTION, CONCENTRATE INTRAVENOUS at 14:10

## 2019-08-12 RX ADMIN — SODIUM CHLORIDE 2.5 MG/HR: 900 INJECTION, SOLUTION INTRAVENOUS at 19:05

## 2019-08-12 RX ADMIN — IOPAMIDOL 100 ML: 755 INJECTION, SOLUTION INTRAVENOUS at 10:17

## 2019-08-12 RX ADMIN — SODIUM CHLORIDE 5 MG/HR: 900 INJECTION, SOLUTION INTRAVENOUS at 09:19

## 2019-08-12 RX ADMIN — Medication 10 ML: at 11:31

## 2019-08-12 RX ADMIN — SODIUM CHLORIDE 50 ML: 900 INJECTION, SOLUTION INTRAVENOUS at 09:40

## 2019-08-12 RX ADMIN — Medication 10 ML: at 22:13

## 2019-08-12 NOTE — ED PROVIDER NOTES
EMERGENCY DEPARTMENT HISTORY AND PHYSICAL EXAM    8:03 AM      Date: 8/12/2019  Patient Name: Daniella Lopez    History of Presenting Illness     Chief Complaint   Patient presents with    Aphasia         History Provided By: Patient and Patient's Wife    Additional History (Context): Daniella Lopez is a 71 y.o. male with hypertension who presents with slurred speech. Patient and wife states that patient had slurred speech yesterday afternoon. She thought it was induced from heat. It resolved a couple minutes later. Patient felt better throughout the day. Patient had a sleep study in the hospital.  Sleep study ended at 4 AM.  Patient went home went to sleep woke up at 7 AM.  He states about 10 minutes later he had a second episode of slurred speech and according to the wife, he was walking sideways to the right. Patient denies chest pain, nausea, vomiting or diarrhea. No symptoms at this time. Patient denies smoking alcohol or recreational drug use. Initial blood sugar 174.      PCP: Timmy George MD      Current Facility-Administered Medications   Medication Dose Route Frequency Provider Last Rate Last Dose    sodium chloride (NS) flush 5-40 mL  5-40 mL IntraVENous Q8H Vipul Becker DO   10 mL at 08/12/19 1132    sodium chloride (NS) flush 5-40 mL  5-40 mL IntraVENous PRN Raya Becker DO        labetalol (NORMODYNE;TRANDATE) 20 mg/4 mL (5 mg/mL) injection 10 mg  10 mg IntraVENous Q10MIN PRN Vipul Becker DO        niCARdipine (CARDENE) 25 mg in 0.9% sodium chloride 250 mL infusion  0-15 mg/hr IntraVENous TITRATE Raya Becker DO   Stopped at 08/12/19 1136    sodium chloride (NS) flush 5-40 mL  5-40 mL IntraVENous Q8H Torrie Beach PA-C   10 mL at 08/12/19 1131    sodium chloride (NS) flush 5-40 mL  5-40 mL IntraVENous PRN Jackie Beach PA-C        potassium chloride 10 mEq, lidocaine (PF) (XYLOCAINE) 10 mg/mL (1 %) 1 mL in 0.9% sodium chloride 100 mL IVPB   IntraVENous Q1H Magdalene DUSTIN FUENTES MD           Past History     Past Medical History:  Past Medical History:   Diagnosis Date    Essential hypertension     Sleep apnea     on cpap       Past Surgical History:  Past Surgical History:   Procedure Laterality Date    COLONOSCOPY N/A 4/15/2019    COLONOSCOPY performed by Jenny Perez [facility] at [facility] ENDOSCOPY    HX TONSILLECTOMY         Family History:  Family History   Problem Relation Age of Onset    Cancer Mother     Cancer Father     Heart Disease Father        Social History:  Social History     Tobacco Use    Smoking status: Former Smoker    Smokeless tobacco: Never Used   Substance Use Topics    Alcohol use: No     Alcohol/week: 0.0 standard drinks    Drug use: No       Allergies:  No Known Allergies      Review of Systems       Review of Systems   Constitutional: Negative. Negative for chills, diaphoresis and fever. HENT: Negative. Negative for congestion, rhinorrhea and sore throat. Eyes: Negative. Negative for pain, discharge and redness. Respiratory: Negative. Negative for cough, chest tightness, shortness of breath and wheezing. Cardiovascular: Negative. Negative for chest pain. Gastrointestinal: Negative. Negative for abdominal pain, constipation, diarrhea, nausea and vomiting. Genitourinary: Negative. Negative for dysuria, flank pain, frequency, hematuria and urgency. Musculoskeletal: Negative. Negative for back pain and neck pain. Skin: Negative. Negative for rash. Neurological: Positive for speech difficulty and weakness. Negative for syncope, numbness and headaches. Psychiatric/Behavioral: Negative. All other systems reviewed and are negative. Physical Exam     Visit Vitals  /83   Pulse (!) 102   Temp 98.9 °F (37.2 °C)   Resp 17   Ht 5' 9\" (1.753 m)   Wt 100.3 kg (221 lb 1.9 oz)   SpO2 98%   BMI 32.65 kg/m²         Physical Exam   Constitutional: He is oriented to person, place, and time.  He appears well-developed and well-nourished. Non-toxic appearance. He does not have a sickly appearance. He does not appear ill. No distress. HENT:   Head: Normocephalic and atraumatic. Mouth/Throat: Oropharynx is clear and moist. No oropharyngeal exudate. Eyes: Pupils are equal, round, and reactive to light. Conjunctivae and EOM are normal. No scleral icterus. Neck: Normal range of motion. Neck supple. No hepatojugular reflux and no JVD present. No tracheal deviation present. No thyromegaly present. Cardiovascular: Normal rate, regular rhythm, S1 normal, S2 normal, normal heart sounds, intact distal pulses and normal pulses. Exam reveals no gallop, no S3 and no S4. No murmur heard. Pulses:       Radial pulses are 2+ on the right side, and 2+ on the left side. Dorsalis pedis pulses are 2+ on the right side, and 2+ on the left side. Pulmonary/Chest: Effort normal and breath sounds normal. No respiratory distress. He has no decreased breath sounds. He has no wheezes. He has no rhonchi. He has no rales. Abdominal: Soft. Normal appearance and bowel sounds are normal. He exhibits no distension and no mass. There is no hepatosplenomegaly. There is no tenderness. There is no rigidity, no rebound, no guarding, no CVA tenderness, no tenderness at McBurney's point and negative Johnson's sign. Musculoskeletal: Normal range of motion. He exhibits no edema or tenderness. Strength 4 out of 5 throughout   Lymphadenopathy:        Head (right side): No submental, no submandibular, no preauricular and no occipital adenopathy present. Head (left side): No submental, no submandibular, no preauricular and no occipital adenopathy present. He has no cervical adenopathy. Right: No supraclavicular adenopathy present. Left: No supraclavicular adenopathy present. Neurological: He is alert and oriented to person, place, and time. He has normal strength and normal reflexes. He is not disoriented.  No cranial nerve deficit or sensory deficit. Coordination and gait normal. GCS eye subscore is 4. GCS verbal subscore is 5. GCS motor subscore is 6. Able to ambulate. No ataxia noted. I did not appreciate any slurred speech. Skin: Skin is warm, dry and intact. No rash noted. He is not diaphoretic. Psychiatric: He has a normal mood and affect. His speech is normal and behavior is normal. Judgment and thought content normal. Cognition and memory are normal.   Nursing note and vitals reviewed. Diagnostic Study Results     Labs -  Recent Results (from the past 12 hour(s))   GLUCOSE, POC    Collection Time: 08/12/19  7:52 AM   Result Value Ref Range    Glucose (POC) 174 (H) 70 - 268 mg/dL   METABOLIC PANEL, BASIC    Collection Time: 08/12/19  8:20 AM   Result Value Ref Range    Sodium 143 136 - 145 mmol/L    Potassium 2.4 (LL) 3.5 - 5.5 mmol/L    Chloride 106 100 - 111 mmol/L    CO2 26 21 - 32 mmol/L    Anion gap 11 3.0 - 18 mmol/L    Glucose 154 (H) 74 - 99 mg/dL    BUN 26 (H) 7.0 - 18 MG/DL    Creatinine 1.54 (H) 0.6 - 1.3 MG/DL    BUN/Creatinine ratio 17 12 - 20      GFR est AA 55 (L) >60 ml/min/1.73m2    GFR est non-AA 46 (L) >60 ml/min/1.73m2    Calcium 9.2 8.5 - 10.1 MG/DL   CBC WITH AUTOMATED DIFF    Collection Time: 08/12/19  8:20 AM   Result Value Ref Range    WBC 9.1 4.6 - 13.2 K/uL    RBC 4.06 (L) 4.70 - 5.50 M/uL    HGB 11.1 (L) 13.0 - 16.0 g/dL    HCT 33.6 (L) 36.0 - 48.0 %    MCV 82.8 74.0 - 97.0 FL    MCH 27.3 24.0 - 34.0 PG    MCHC 33.0 31.0 - 37.0 g/dL    RDW 14.3 11.6 - 14.5 %    PLATELET 940 721 - 366 K/uL    MPV 11.3 9.2 - 11.8 FL    NEUTROPHILS 77 (H) 40 - 73 %    LYMPHOCYTES 15 (L) 21 - 52 %    MONOCYTES 6 3 - 10 %    EOSINOPHILS 1 0 - 5 %    BASOPHILS 1 0 - 2 %    ABS. NEUTROPHILS 7.1 1.8 - 8.0 K/UL    ABS. LYMPHOCYTES 1.4 0.9 - 3.6 K/UL    ABS. MONOCYTES 0.5 0.05 - 1.2 K/UL    ABS. EOSINOPHILS 0.1 0.0 - 0.4 K/UL    ABS.  BASOPHILS 0.1 0.0 - 0.1 K/UL    DF AUTOMATED     CARDIAC PANEL,(CK, CKMB & TROPONIN)    Collection Time: 08/12/19  8:20 AM   Result Value Ref Range     (H) 39 - 308 U/L    CK - MB 2.4 <3.6 ng/ml    CK-MB Index 0.7 0.0 - 4.0 %    Troponin-I, QT 0.04 0.0 - 0.045 NG/ML       Radiologic Studies -   CTA HEAD NECK W WO CONT   Final Result   Impression:   1. Patent intra- and extracranial cerebral arteries. No evidence of high-grade   stenosis or acute dissection. 2. There is atherosclerotic disease with mild stenosis of the left MCA at the   left trifurcation. 3. Other incidental findings as described above. Thank you for enabling us to participate in the care of this patient. CT HEAD WO CONT   Final Result   IMPRESSION[de-identified]   1.  No acute intracranial pathology. No acute hemorrhage. 2. Mild chronic small vessel ischemic change is noted. Report called to ER at 8:09 AM.   Thank you for this referral.      CT HEAD WO CONT    (Results Pending)   MRI BRAIN WO CONT    (Results Pending)         Medical Decision Making   Provider Notes (Medical Decision Making):  MDM  Number of Diagnoses or Management Options  Cerebrovascular accident (CVA), unspecified mechanism [facility]):   Diagnosis management comments: CVA  TIA  ACS  Patient is a 56-year-old with history of hypertension presents with TIA versus CVA symptoms. Will order a CVA work-up. I am the first provider for this patient. I reviewed the vital signs, available nursing notes, past medical history, past surgical history, family history and social history. Vital Signs-Reviewed the patient's vital signs. Records Reviewed: Nursing Notes (Time of Review: 8:03 AM)    ED Course: Progress Notes, Reevaluation, and Consults:  *  Consult:  Discussed care with Dr Marly Mcgee, teleneurologist. Standard discussion; including history of patients chief complaint, available diagnostic results, and treatment course. Will see patient.    8:08 AM    Labs essentially normal.  CT head viewed and discussed with patient and tell neurologist.  He was agreed to TPA this patient. 8:23 AM 8/12/2019    Consult:  Discussed care with Dr Sangeetha Sky.  Standard discussion; including history of patients chief complaint, available diagnostic results, and treatment course. Agrees to admit. 8:50 PM    CRITICAL CARE:  12:51 PM  I have spent 35 minutes of critical care time involved in lab review, consultations with specialist, family decision-making, and documentation. During this entire length of time I was immediately available to the patient. Critical Care: The reason for providing this level of medical care for this critically ill patient was due a critical illness that impaired one or more vital organ systems such that there was a high probability of imminent or life threatening deterioration in the patients condition. This care involved high complexity decision making to assess, manipulate, and support vital system functions, to treat this degreee vital organ system failure and to prevent further life threatening deterioration of the patients condition. Diagnosis         Clinical Impression:   1. Cerebrovascular accident (CVA), unspecified mechanism [facility])        Disposition: Admitted      Attestation        Provider Attestation:     I personally performed the services described in the documentation, reviewed the documentation and it accurately and completely records my words and actions utilizing the [address] August 12, 2019 at 12:52 PM - Dmitri Becker DO    Disclaimer. It is dictated using utilizing voice recognition software. Unfortunately this leads to occasional typographical errors. I apologize in advance if the situation occurs. If questions arise please do not hesitate to contact me or call our department.

## 2019-08-12 NOTE — PROGRESS NOTES
conducted an initial consultation and Spiritual Assessment for Kitty Ricci, who is a 71 y. o.,male. Patients Primary Language is: [place]. According to the patients EMR Hoahaoism Affiliation is: [facility].     The reason the Patient came to the hospital is:   Patient Active Problem List    Diagnosis Date Noted    CVA (cerebral vascular accident) ([facility] [address] 08/12/2019    Severe obesity ([facility] [address] 02/08/2019        The  provided the following Interventions:  Initiated a relationship of care and support. Provided information about Spiritual Care Services. Offered prayer and assurance of continued prayers on patient's behalf. Chart reviewed. The following outcomes where achieved:   confirmed Patient's Hoahaoism Affiliation. Patient expressed gratitude for 's visit. Assessment:  Patient's ana in God is very important for him to cope. Patient does not have any Latter day/cultural needs that will affect patients preferences in health care. There are no spiritual or Latter day issues which require intervention at this time. Plan:  Chaplains will continue to follow and will provide pastoral care on an as needed/requested basis.  recommends bedside caregivers page  on duty if patient shows signs of acute spiritual or emotional distress.     [address]  ([phone])

## 2019-08-12 NOTE — PROGRESS NOTES
Problem: Dysphagia (Adult)  Goal: *Acute Goals and Plan of Care (Insert Text)  Description  Patient will:  1. Tolerate PO trials with 0 s/s overt distress in 4/5 trials  2. Utilize compensatory swallow strategies/maneuvers (decrease bite/sip, size/rate, alt. liq/sol) with min cues in 4/5 trials  3. Perform oral-motor/laryngeal exercises to increase oropharyngeal swallow function with min cues  4. Complete an objective swallow study (i.e., MBSS) to assess swallow integrity, r/o aspiration, and determine of safest LRD, min A as indicated/ordered by MD     Recommend:   Mech soft, nectar thick liquids  NO STRAWS  Meds with nectar thick liquids or in puree   Aspiration precautions  HOB >45 degrees during all intake and for at least 30 min after po   Small bites/sips, slow rate of intake, alternating bites/sips  Oral care three times daily  Motor speech evaluation      Outcome: Progressing Towards Goal     SPEECH LANGUAGE PATHOLOGY BEDSIDE SWALLOW EVALUATION/TREATMENT    Patient: Ranulfo Mcclendon (1 y.o. male)  Date: 8/12/2019  Primary Diagnosis: CVA (cerebral vascular accident) ([facility] [address] [I63.9]  Precautions: Aspiration     PLOF: Regular diet with thin liquids     ASSESSMENT :  Based on the objective data described below, the patient presents with mild oral and suspected mod pharyngeal dysphagia. Pt alert and oriented x4 with mod dysarthria. Oral mech exam revealed R facial droop with tongue deviation to the right. Pt with decreased vocal intensity and breathy vocal quality. Pt demo immediate weak cough with thin liquids and nectar thick liquids + straw, resolved with nectar thick liquids by cup. Pt demo discoordinated bolus manipulation with regular solids, improved with mech soft presentations. Recommend initiate mech soft diet with nectar thick liquids with strict use of the above mentioned compensatory strategies/aspiration precautions. Further recommend motor speech evaluation and possible MBS when appropriate. TREATMENT :  Skilled therapy initiated; Educated pt and wife aspiration risk post CVA, diet recs, aspiration precautions and importance of compensatory swallow techniques to decrease aspiration risk (NO STRAWS, decrease rate of intake & sip/bite size, upright @HOB for all po intake and ~30 minutes after po); verbalized comprehension. Further discussed with RN. Will continue to follow for further dysphagia management. Patient will benefit from skilled intervention to address the above impairments. Patient's rehabilitation potential is considered to be Excellent  Factors which may influence rehabilitation potential include:   ? None noted  ? Mental ability/status  ? Medical condition  ? Home/family situation and support systems  ? Safety awareness  ? Pain tolerance/management  ? Other:      PLAN :  Recommendations and Planned Interventions:  As above   Frequency/Duration: Patient will be followed by speech-language pathology 1-2 times per day/4-7 days per week to address goals. Discharge Recommendations: Inpatient Rehab     SUBJECTIVE:   Patient stated I don't want to end up in a nursing home. OBJECTIVE:     Past Medical History:   Diagnosis Date    Essential hypertension     Sleep apnea     on cpap       Prior Level of Function/Home Situation: Home   Diet prior to admission: Regular/thin liquids   Current Diet:  NPO; recommend initiate mech soft, nectar thick liquids    Cognitive and Communication Status:  Neurologic State: Alert, Eyes open spontaneously  Orientation Level: Oriented X4  Cognition: Appropriate for age attention/concentration  Oral Assessment:  Oral Assessment  Labial: Right droop; Decreased rate  Dentition: Natural  Oral Hygiene: Good  Lingual: Right deviation  Velum: Unable to visualize  Mandible: No impairment  P.O.  Trials:  Patient Position: 44 at [facility]  Vocal quality prior to P.O.: Low volume;Breathy  Consistency Presented: Thin liquid; Nectar thick liquid; Solid;Mechanical soft  How Presented: Self-fed/presented;SLP-fed/presented;Spoon;Straw;Successive swallows  Bolus Acceptance: No impairment  Bolus Formation/Control: Impaired  Type of Impairment: Mastication;Delayed  Propulsion: Delayed (# of seconds)  Oral Residue: Lingual;Less than 10% of bolus  Initiation of Swallow: Delayed (# of seconds)  Laryngeal Elevation: Decreased  Aspiration Signs/Symptoms: Weak cough(With thin liquids and nectar thick liquids + straw )  Pharyngeal Phase Characteristics: Effortful swallow  Effective Modifications: Small sips and bites;Cup/sip  Cues for Modifications: Minimal  Oral Phase Severity: Mild  Pharyngeal Phase Severity : Moderate    PAIN:  Start of Eval: 0  End of Eval: 0     After treatment:   ?            Patient left in no apparent distress sitting up in chair  ? Patient left in no apparent distress in bed  ? Call bell left within reach  ? Nursing notified  ? Family present  ? Caregiver present  ? Bed alarm activated    COMMUNICATION/EDUCATION:   ?            Aspiration precautions; swallow safety; compensatory techniques. ?            Patient/family have participated as able in goal setting and plan of care. ?            Patient/family agree to work toward stated goals and plan of care. ?            Patient understands intent and goals of therapy; neutral about participation. ? Patient unable to participate in goal setting/plan of care; educ ongoing with interdisciplinary staff  ? Posted safety precautions in patient's room.     Thank you for this referral,  Adriana Gillette M.S., [id number] [facility]  Speech-Language Pathologist

## 2019-08-12 NOTE — ED TRIAGE NOTES
Per the patient's wife, \"yesterday at about 2:00 in the after noon, he was having trouble speaking and walking. He got better later on in the day. This morning when he woke up, he was having trouble speaking and walking and it looked like the right side of his face was droopy. \"

## 2019-08-12 NOTE — CONSULTS
Neurology   Consultation Note        Patient: Gen Luna MRN: [id number]  SSN: [id number]    YOB: 1955  Age: 71 y.o. Sex: male        ID: 44-year-old male with history to include uncontrolled hypertension, prediabetes, and STEFANIE on home BiPAP  CC: presents with dysarthria. HPI: The patient presented to the [facility] ED with complaint of dysarthria with the first episode yesterday afternoon, in which he had one episode that resolved a few minutes later. He felt better and had an in-hospital sleep study last night in order to follow-up for his history of sleep apnea. He has been adhering to his BiPAP at home but needed to complete another study due to insurance purposes. He has followed by Dr. Laura Walker in the outpatient setting for this. After the sleep study ended in the early morning he went home to sleep then woke up at 7 am and 10 minutes later he had another episode of dysarthria, and he had right facial droop and he was veering off towards the right when ambulating. He presented to the ED, was seen by teleneurology, and received IV tPA. He required labetalol IV in the ED prior to receiving tPA. His inial blood pressure on presentation was 182/100 but was as high as 592 systolic. He was placed on a nicardipine infusion. He is on several antihypertensive medications at home, and he reports adhering to his regimen. He is not on an antiplatelet. He reports having a history of prediabetes. Remote history of tobacco use, quit smoking in 1984. Denies alcohol or illicit drug use. He is a retired . In the ICU he has new right upper extremity weakness, and still with right facial droop and has moderate dysarthria. He is not complaining of a headache. He denies diplopia, nausea, or vomiting.       Past Medical History:   Diagnosis Date    Essential hypertension     Sleep apnea     on cpap        Past Surgical History:   Procedure Laterality Date    COLONOSCOPY N/A 4/15/2019 COLONOSCOPY performed by Doug Dickson MD at [facility] ENDOSCOPY    HX TONSILLECTOMY         Social History     Socioeconomic History    Marital status: UNKNOWN     Spouse name: Not on file    Number of children: Not on file    Years of education: Not on file    Highest education level: Not on file   Occupational History    Not on file   Social Needs    Financial resource strain: Not on file    Food insecurity:     Worry: Not on file     Inability: Not on file    Transportation needs:     Medical: Not on file     Non-medical: Not on file   Tobacco Use    Smoking status: Former Smoker    Smokeless tobacco: Never Used   Substance and Sexual Activity    Alcohol use: No     Alcohol/week: 0.0 standard drinks    Drug use: No    Sexual activity: Not on file   Lifestyle    Physical activity:     Days per week: Not on file     Minutes per session: Not on file    Stress: Not on file   Relationships    Social connections:     Talks on phone: Not on file     Gets together: Not on file     Attends Islam service: Not on file     Active member of club or organization: Not on file     Attends meetings of clubs or organizations: Not on file     Relationship status: Not on file    Intimate partner violence:     Fear of current or ex partner: Not on file     Emotionally abused: Not on file     Physically abused: Not on file     Forced sexual activity: Not on file   Other Topics Concern    Not on file   Social History Narrative    Not on file        Family History   Problem Relation Age of Onset    Cancer Mother     Cancer Father     Heart Disease Father         No Known Allergies     Current Facility-Administered Medications   Medication Dose Route Frequency    sodium chloride (NS) flush 5-40 mL  5-40 mL IntraVENous Q8H    sodium chloride (NS) flush 5-40 mL  5-40 mL IntraVENous PRN    labetalol (NORMODYNE;TRANDATE) 20 mg/4 mL (5 mg/mL) injection 10 mg  10 mg IntraVENous Q10MIN PRN    niCARdipine (CARDENE) 25 mg in 0.9% sodium chloride 250 mL infusion  0-15 mg/hr IntraVENous TITRATE    sodium chloride (NS) flush 5-40 mL  5-40 mL IntraVENous Q8H    sodium chloride (NS) flush 5-40 mL  5-40 mL IntraVENous PRN    potassium chloride 10 mEq, lidocaine (PF) (XYLOCAINE) 10 mg/mL (1 %) 1 mL in 0.9% sodium chloride 100 mL IVPB   IntraVENous Q1H        Review of Systems:   As above otherwise 11 point review of systems negative including:  Constitutional: no fever or chills  Skin: denies rash or itching  HENT:  denies tinnitus, hearing loss  Eyes: denies diplopia , vision loss  Respiratory: denies shortness of breath  Cardiovascular: denies chest pain, dyspnea on exertion  Gastrointestinal: denies nausea, vomiting, diarrhea, constipation  Genitourinary: denies incontinence  Musculoskeletal: denies joint pain or swelling  Endocrine: denies weight change  Hematology: denies easy bruising or bleeding   Neurological: as above in HPI        Data:  Recent Results (from the past 24 hour(s))   GLUCOSE, POC    Collection Time: 08/12/19  7:52 AM   Result Value Ref Range    Glucose (POC) 174 (H) 70 - 849 mg/dL   METABOLIC PANEL, BASIC    Collection Time: 08/12/19  8:20 AM   Result Value Ref Range    Sodium 143 136 - 145 mmol/L    Potassium 2.4 (LL) 3.5 - 5.5 mmol/L    Chloride 106 100 - 111 mmol/L    CO2 26 21 - 32 mmol/L    Anion gap 11 3.0 - 18 mmol/L    Glucose 154 (H) 74 - 99 mg/dL    BUN 26 (H) 7.0 - 18 MG/DL    Creatinine 1.54 (H) 0.6 - 1.3 MG/DL    BUN/Creatinine ratio 17 12 - 20      GFR est AA 55 (L) >60 ml/min/1.73m2    GFR est non-AA 46 (L) >60 ml/min/1.73m2    Calcium 9.2 8.5 - 10.1 MG/DL   CBC WITH AUTOMATED DIFF    Collection Time: 08/12/19  8:20 AM   Result Value Ref Range    WBC 9.1 4.6 - 13.2 K/uL    RBC 4.06 (L) 4.70 - 5.50 M/uL    HGB 11.1 (L) 13.0 - 16.0 g/dL    HCT 33.6 (L) 36.0 - 48.0 %    MCV 82.8 74.0 - 97.0 FL    MCH 27.3 24.0 - 34.0 PG    MCHC 33.0 31.0 - 37.0 g/dL    RDW 14.3 11.6 - 14.5 %    PLATELET 468 240 - 420 K/uL    MPV 11.3 9.2 - 11.8 FL    NEUTROPHILS 77 (H) 40 - 73 %    LYMPHOCYTES 15 (L) 21 - 52 %    MONOCYTES 6 3 - 10 %    EOSINOPHILS 1 0 - 5 %    BASOPHILS 1 0 - 2 %    ABS. NEUTROPHILS 7.1 1.8 - 8.0 K/UL    ABS. LYMPHOCYTES 1.4 0.9 - 3.6 K/UL    ABS. MONOCYTES 0.5 0.05 - 1.2 K/UL    ABS. EOSINOPHILS 0.1 0.0 - 0.4 K/UL    ABS. BASOPHILS 0.1 0.0 - 0.1 K/UL    DF AUTOMATED     CARDIAC PANEL,(CK, CKMB & TROPONIN)    Collection Time: 08/12/19  8:20 AM   Result Value Ref Range     (H) 39 - 308 U/L    CK - MB 2.4 <3.6 ng/ml    CK-MB Index 0.7 0.0 - 4.0 %    Troponin-I, QT 0.04 0.0 - 0.045 NG/ML              Vital Signs:  Patient Vitals for the past 24 hrs:   Temp Pulse Resp BP SpO2   08/12/19 1140  (!) 102 17 160/83 98 %   08/12/19 1032   21  97 %   08/12/19 1025  97 23     08/12/19 1023    167/77    08/12/19 0953  95  182/78    08/12/19 0950  95 20 182/78 95 %   08/12/19 0945  96 24 174/73 97 %   08/12/19 0939  93 14 174/63 98 %   08/12/19 0920  76 17 (!) 171/94 96 %   08/12/19 0915  74 17 185/86 97 %   08/12/19 0910  74 10 174/87 97 %   08/12/19 0909  67 17 181/83 97 %   08/12/19 0905  78 19 185/82 96 %   08/12/19 0900  74 17 170/80 96 %   08/12/19 0855  73 14 171/84 98 %   08/12/19 0850  75 19 168/83 96 %   08/12/19 0845  81 14 172/78 94 %   08/12/19 0843  81 12 170/83 97 %   08/12/19 0842  84 13 181/78 96 %   08/12/19 0839  84  200/87    08/12/19 0830     97 %   08/12/19 0815    200/87 97 %   08/12/19 0755 98.9 °F (37.2 °C) 99 16 (!) 182/100 98 %         PHYSICAL EXAMINATION:      VITAL SIGNS:    Visit Vitals  /83   Pulse (!) 102   Temp 98.9 °F (37.2 °C)   Resp 17   Ht 5' 9\" (1.753 m)   Wt 103.9 kg (229 lb)   SpO2 98%   BMI 33.82 kg/m²       GENERAL: The patient is well developed, well nourished. Tearful at times when he cannot raise his right arm. EXTREMITIES: No clubbing, cyanosis, or edema is identified.   Pulses 2+ and symmetrical.  Muscle tone is normal. Multiple bruises over left arm, reports from helping mother in law move her furniture. HEAD:   Ear, nose, and throat appear to be without trauma. The patient is normocephalic. NEUROLOGIC EXAMINATION    MENTAL STATUS: The patient is awake, alert, and oriented x 4. Fund of knowledge is adequate. Speech is fluent and memory appears to be intact, both long and short term. At least moderate dysarthria. CRANIAL NERVES: II  Visual fields are full to confrontation. Pupils are both 3 mm and briskly reactive to light and accommodation. III, IV, VI  Extraocular movements are intact and there is no nystagmus. V  Facial sensation is intact to pinprick and light touch. VII  Right lower facial weakness. VIII - Hearing is present. IX, X, [address] rises symmetrically. Gag is present. Tongue points towards right. XI - Able to shrug shoulders but weakly on right. MOTOR:  The patient has 5/5 strength in the LUE and BUE. Right upper extremity drops down to bed initially with SBP in upper 140s-150s and no effort against gravity. With SBP 170s he can weakly raise RUE antigravity but it drifts down to bed. No able to  on right hand. Fine finger movements are not able to be done on right hand. Isolated motor group testing reveals no focal abnormalities. Tone is normal.  Sensory examination is intact to pinprick, light touch. Reflexes are 2+ and symmetrical. Plantars are down going. Cerebellar examination reveals no gross ataxia or dysmetria, unable to perform on right upper extremity. Gait not tested.       CT HEAD WO CONT 8/12/19:  Results   CT HEAD WO CONT (Accession [id number]) (Order [id number])   Allergies      No Known Allergies   Exam Information     Status Exam Begun  Exam Ended    Final [99] 8/12/2019 08:00 8/12/2019 08:02   Result Information     Status: Final result (Exam End: 8/12/2019 08:02) Provider Status: Open   Study Result     CT HEAD WO CONT     History: Stroke     Technique: 5 mm axial images acquired through the brain.     CT scans at this facility are performed using dose optimization technique as  appropriate with performed exam, to include automated exposure control,  adjustment of mA and/or kV according to patient's size (including appropriate  matching for site-specific examinations), or use of iterative reconstruction  technique.     Comparison: None     Findings:     Soft tissues: No significant findings.     Skull base/calvarium: Unremarkable.     Brain: There is no acute intracranial hemorrhage or territorial infarction. Mild  chronic ischemic change seen in the basal ganglia and the insula bilaterally. No  mass, hemorrhage or midline shift. No subdural hematoma. Gray-white matter  tissue differentiation remains intact.       Ventricles and cisterns: Unremarkable for age.     Orbits: Unremarkable.      Paranasal Sinuses: Visualized portions of the right maxillary sinus demonstrate  mucosal opacification.      Other findings: None     IMPRESSION  IMPRESSION[de-identified]  1.  No acute intracranial pathology. No acute hemorrhage. 2. Mild chronic small vessel ischemic change is noted.       Report called to ER at 8:09 AM.  Thank you for this referral.       CTA HEAD NECK W WO CONT 8/12/19:  Exam Information     Status Exam Begun  Exam Ended    Final [99] 8/12/2019 10:13 8/12/2019 10:16   Result Information     Status: Final result (Exam End: 8/12/2019 10:16) Provider Status: Open   Study Result     EXAM: CTA HEAD NECK W WO CONT     CLINICAL INDICATIONS: Ataxia, stroke suspected as etiology     TECHNIQUE: Helical CT scan of the brain and neck were performed at 1.25 mm  intervals during rapid IV bolus contrast administration. These data were  reconstructed at .625 mm intervals for vascular analysis. The data was also  reviewed at 2.5 mm intervals for accompanying soft tissue analysis.  3D post  processed images, including surface shaded displays, were produced for this exam  on independent console, permanently archived and interpreted.     All CT scans at this facility are performed using dose optimization technique as  appropriate to a performed exam, to include automated exposure control,  adjustment of the MA and/or kV according to patient size (including appropriate  matching for site-specific examinations) or use of  iterative reconstruction  technique.     CONTRAST: 100 cc Isovue 370     COMPARISON: CT scan head 8/12/2019     CTA SOFT TISSUE ANALYSIS:  Lungs: Clear  Upper chest: Unremarkable  Neck: Unremarkable  Lymph nodes: No adenopathy  Orbits: Unremarkable  Paranasal sinuses: Clear  Brain: No hemorrhage or mass effect. Bones: Unremarkable for age.     CTA NECK VASCULAR ANALYSIS:      Aortic arch: Left sided with typical configuration.     Innominate: Patent     Right Subclavian: Patent  Left Subclavian: Patent     Right carotid:  -CCA: Patent  -ECA: Patent  -ICA: Patent. Estimated 0% stenosis of proximal ICA by NASCET criteria. No  evidence of dissection.     Left carotid:  -CCA: Patent. -ECA: Patent. -ICA: Patent. Estimated 0% stenosis of proximal ICA by NASCET criteria. No  evidence of dissection. There is noted be mild soft plaque involving the left  MCA with approximately 30% stenosis at the left trifurcation.     Right vertebral: Patent. no evidence of dissection. Left vertebral: Patent. No evidence of dissection.        CTA BRAIN VASCULAR ANALYSIS:      Right anterior circulation:  -ICA: Patent  -CANDICE: Patent   -MCA: Patent     Left anterior circulation:  -ICA: Patent  -CANDICE: Patent   -MCA: Patent     -ACOM: Unremarkable  -PCOM: Patent     Posterior circulation:  -RVA: Patent  -LVA: Patent  -Basilar: Patent  -Right PCA: Patent  -Left PCA: Patent     Major dural venous sinuses: Unremarkable     Other: Sebaceous cyst involves the posterior left neck measuring 1.8 x 1.2 cm in  size. Multiple small scattered thyroid nodules are identified.  Opacified right  maxillary sinus associated with chronic sinusitis. Moderate degenerative vertebral changes and disc disease of the cervical spine.        IMPRESSION  Impression:  1. Patent intra- and extracranial cerebral arteries. No evidence of high-grade  stenosis or acute dissection. 2. There is atherosclerotic disease with mild stenosis of the left MCA at the  left trifurcation. 3. Other incidental findings as described above.        Thank you for enabling us to participate in the care of this patient. Assessment/Plan: This is a 41-year-old left-handed male with risk factors to include uncontrolled hypertension, history of prediabetes, obesity, and STEFANIE who presents with dysarthria and right hemiparesis, who is status post IV TPA for suspected stroke. CTA of the head and neck reported patent arteries without evidence of high-grade stenosis or acute dissection, but with atherosclerotic disease with mild stenosis of the left MCA. He has had increased weakness on the right side when seen in the ICU s/p tPA; recommend keeping his blood pressure on the upper end of the parameter after TPA <180/105, about 985-571 systolic as discussed with Dr. Selma Kemp, as the patient has had significant hypertension into the 288L systolic previously on admission and in the outpatient setting. Some improvement in RUE strength with SBP in 170s. He will have stat MRI brain. Close blood pressure monitoring. Post tPA protocol orders, except keep on bedrest for now with his worsened or fluctuating exam.  CT head tomorrow morning at 24 hours post tPA or if worsening exam or headache. Maya Roman NP     I re-evaluated this ma at about 1400. At that time her was severely dysarthric. His speech was fluent however. He had a dense right central facial.  He was weak in the right upper limb, about 2/5 in an upper motor neuron distribution. The leg was about 3/5. The CTA did not show any large vessel occlusions.   He's going for an MRI scan of the brain at this time.  I suspect this was a small vessel stroke. Above reviewed,edited where necessary and I agree. The patient was independently examined by this examiner.   Enriqueta Nguyen M.D.

## 2019-08-12 NOTE — ED NOTES
Delay in alteplase administration due to difficulty getting BP reading and elevated BP. Meds administered per [facility] before alteplase admin. Manual BP cuff used to obtain BP prior to admin of ordered meds.

## 2019-08-12 NOTE — PROGRESS NOTES
SLP Note:    Bedside swallow evaluation completed; full report to follow. Recommend mech soft, nectar thick liquids. No straws. Strict aspiration precautions.     Thank you for this referral,  Karlene Espino M.S., [id number] [facility]  Speech-Language Pathologist

## 2019-08-12 NOTE — H&P
[facility]  Pulmonary, Critical Care, and Sleep Medicine    Name: Anand Eric MRN: [id number]   : 1955 Hospital: [address] [place] [address] Dr   Date: 2019        Critical Care History and Physical      IMPRESSION:   · CVA S/P tPA - R sided facial droop, slurred speech, ataxia and R leg weakness noted. tPA administered approximately 0900. MRI pending  · Hypokalemia - K+ 2.4  · MARISSA - creatinine elevated from baseline per care everywhere, most recent Cr from last week was 1.1     Patient Active Problem List   Diagnosis Code    Severe obesity ([facility] [address] E66.01    CVA (cerebral vascular accident) ([facility] [address] I63.9        RECOMMENDATIONS:   · Resp: Comfortable on RA, strict aspiration precautions HOB >30'. Monitor airway. · I/D: Afebrile; aleukocytosis; trend WBCs and temp curve  · Hem/Onc: Daily CBC; H/H, and plts are stable  · CVS: HD stable; trend CE  · Metabolic: Daily BMP; monitor e-lytes; replace PRN  · Renal: Trend Renal indices; monitor I/O  · Endocrine: POC Glucose q6; Check TSH level  · GI: NPO, formal SLP swallow evaluation  · Musc/Skin: No acute issues  · Neuro:Q1 hr neuro checks, MRI pending, follow-up CT tomorrow 24 hours post tPA. · Code Status: full code     Best Practices/Safety Bundles:  · Sepsis Bundle per Hospital Protocol  · Glycemic control; avoid Hypoglycemia  · Stress ulcer prophylaxis: N/A  · DVT prophylaxis: SCDs  · Need for Lines, perkins assessed. Subjective/History: This patient has been seen and evaluated at the request of Dr. Aniceto Dior for CVA S/P tPA.    19    Patient is a 71 y.o. male with pmhx of HTN who presented to the ED for evaluation of right leg weakness, ataxia, slurred speech and right sided facial droop. Per patient's wife, yesterday around 2 [place] began experiencing the above stated symptoms.  She states that she got him into the car and gave him some water and after approximately 10 minutes his symptoms resolved. She states that he had a sleep study over night last night and returned home at 4 am this morning. She states that at that time he was asymptomatic. She reports around 7 am when she and the patient woke up and got out of bed he was exhibiting slurred speech, right sided facial droop, ataxia and right leg weakness again and was brought to the ED. Patient evaluated by teleneurology. CT head completed showing no acute intracranial process. Teleneuro recommended tPA which was administered at approximately 0900. Patient was started on a nicardipine gtt for tighter blood pressure control as his SBP >200. CTA head and neck completed w/ formal read pending. Upon arrival to the ICU, patient A&O x3 w/ slurred speech, facial droop and new onset right arm weakness. Patient states that it started approximately an hour PTA to the ICU. On nicardipine gtt. Denies dizziness, lightheadedness, blurred vision, chest pain, abdominal pain, SOB, N/V. Notable labs include K+ of 2.4, Cr of 1.54, and troponin of 0.04. Past Medical History:   Diagnosis Date    Essential hypertension     Sleep apnea     on cpap        Past Surgical History:   Procedure Laterality Date    COLONOSCOPY N/A 4/15/2019    COLONOSCOPY performed by Chio Parsons MD at [facility] ENDOSCOPY    HX TONSILLECTOMY          Prior to Admission medications    Medication Sig Start Date End Date Taking? Authorizing Provider   potassium chloride (K-DUR, KLOR-CON) 20 mEq tablet Take  by mouth daily. Provider, Historical   losartan-hydroCHLOROthiazide (HYZAAR) 100-25 mg per tablet Take 1 Tab by mouth daily. Provider, Historical   amLODIPine (NORVASC) 10 mg tablet Take  by mouth daily. Provider, Historical   metoprolol succinate (TOPROL-XL) 50 mg XL tablet Take  by mouth daily. Provider, Historical   ALLOPURINOL PO Take 300 mg by mouth daily. Provider, Historical   GEMFIBROZIL PO Take 600 mg by mouth two (2) times a day.     Provider, Historical Current Facility-Administered Medications   Medication Dose Route Frequency    sodium chloride (NS) flush 5-40 mL  5-40 mL IntraVENous Q8H    niCARdipine (CARDENE) 25 mg in 0.9% sodium chloride 250 mL infusion  0-15 mg/hr IntraVENous TITRATE    potassium chloride (KLOR-CON) packet for solution 40 mEq  40 mEq Oral NOW    Followed by   [facility] potassium chloride (KLOR-CON) packet for solution 20 mEq  20 mEq Oral ONCE    sodium chloride (NS) flush 5-40 mL  5-40 mL IntraVENous Q8H    iopamidol (ISOVUE-370) 76 % injection 100 mL  100 mL IntraVENous RAD ONCE       No Known Allergies     Social History     Tobacco Use    Smoking status: Former Smoker    Smokeless tobacco: Never Used   Substance Use Topics    Alcohol use: No     Alcohol/week: 0.0 standard drinks        Family History   Problem Relation Age of Onset    Cancer Mother     Cancer Father     Heart Disease Father         Review of Systems:  A comprehensive review of systems was negative except for that written in the HPI. Objective:   Vital Signs:    Visit Vitals  /78   Pulse 95   Temp 98.9 °F (37.2 °C)   Resp 20   Ht 5' 9\" (1.753 m)   Wt 103.9 kg (229 lb)   SpO2 95%   BMI 33.82 kg/m²       O2 Device: Room air       Temp (24hrs), Av.9 °F (37.2 °C), Min:98.9 °F (37.2 °C), Max:98.9 °F (37.2 °C)       Intake/Output:   Last shift:      No intake/output data recorded. Last 3 shifts: No intake/output data recorded. No intake or output data in the 24 hours ending 19 1017    Physical Exam:     General:  Alert, cooperative, NAD   Head:  R facial droop, abnormality, atraumatic. Eyes:  Conjunctivae/corneas clear. PERRL,   Nose: Nares normal. Septum midline. Mucosa normal.    Throat: Lips, mucosa, and tongue normal. Teeth and gums normal.   Neck: Supple, symmetrical, trachea midline, no adenopathy   Lungs:   Symmetrical chest rise; good AE bilat; CTAB; no wheezes/rhonchi/rales noted.     Heart:  RRR, S1, S2 normal, no m/r/g   Abdomen:   Soft, non-tender. Bowel sounds normal. No masses,  No organomegaly. Extremities: Extremities normal, atraumatic, no cyanosis or edema. Pulses: 2+ and symmetric all extremities. Skin: Skin color, texture, turgor normal. No rashes or lesions   Neurologic: R facial droop, slurred speech, Strength 5/5 in LUE, 1/5 in RUE, strenght 5/5 in BLE   Devices:  PIVs       Data:     Recent Results (from the past 24 hour(s))   GLUCOSE, POC    Collection Time: 08/12/19  7:52 AM   Result Value Ref Range    Glucose (POC) 174 (H) 70 - 565 mg/dL   METABOLIC PANEL, BASIC    Collection Time: 08/12/19  8:20 AM   Result Value Ref Range    Sodium 143 136 - 145 mmol/L    Potassium 2.4 (LL) 3.5 - 5.5 mmol/L    Chloride 106 100 - 111 mmol/L    CO2 26 21 - 32 mmol/L    Anion gap 11 3.0 - 18 mmol/L    Glucose 154 (H) 74 - 99 mg/dL    BUN 26 (H) 7.0 - 18 MG/DL    Creatinine 1.54 (H) 0.6 - 1.3 MG/DL    BUN/Creatinine ratio 17 12 - 20      GFR est AA 55 (L) >60 ml/min/1.73m2    GFR est non-AA 46 (L) >60 ml/min/1.73m2    Calcium 9.2 8.5 - 10.1 MG/DL   CBC WITH AUTOMATED DIFF    Collection Time: 08/12/19  8:20 AM   Result Value Ref Range    WBC 9.1 4.6 - 13.2 K/uL    RBC 4.06 (L) 4.70 - 5.50 M/uL    HGB 11.1 (L) 13.0 - 16.0 g/dL    HCT 33.6 (L) 36.0 - 48.0 %    MCV 82.8 74.0 - 97.0 FL    MCH 27.3 24.0 - 34.0 PG    MCHC 33.0 31.0 - 37.0 g/dL    RDW 14.3 11.6 - 14.5 %    PLATELET 726 844 - 371 K/uL    MPV 11.3 9.2 - 11.8 FL    NEUTROPHILS 77 (H) 40 - 73 %    LYMPHOCYTES 15 (L) 21 - 52 %    MONOCYTES 6 3 - 10 %    EOSINOPHILS 1 0 - 5 %    BASOPHILS 1 0 - 2 %    ABS. NEUTROPHILS 7.1 1.8 - 8.0 K/UL    ABS. LYMPHOCYTES 1.4 0.9 - 3.6 K/UL    ABS. MONOCYTES 0.5 0.05 - 1.2 K/UL    ABS. EOSINOPHILS 0.1 0.0 - 0.4 K/UL    ABS.  BASOPHILS 0.1 0.0 - 0.1 K/UL    DF AUTOMATED     CARDIAC PANEL,(CK, CKMB & TROPONIN)    Collection Time: 08/12/19  8:20 AM   Result Value Ref Range     (H) 39 - 308 U/L    CK - MB 2.4 <3.6 ng/ml    CK-MB Index 0.7 0.0 - 4.0 % Troponin-I, QT 0.04 0.0 - 0.045 NG/ML           No results for input(s): FIO2I, IFO2, HCO3I, IHCO3, HCOPOC, PCO2I, PCOPOC, IPHI, PHI, PHPOC, PO2I, PO2POC in the last 72 hours. No lab exists for component: IPOC2    Telemetry:normal sinus rhythm    Imaging:  I have personally reviewed the patients radiographs and have reviewed the reports:      CT Results  (Last 48 hours)               08/12/19 1016  CTA HEAD NECK W WO CONT Final result    Impression:  Impression:   1. Patent intra- and extracranial cerebral arteries. No evidence of high-grade   stenosis or acute dissection. 2. There is atherosclerotic disease with mild stenosis of the left MCA at the   left trifurcation. 3. Other incidental findings as described above. Thank you for enabling us to participate in the care of this patient. Narrative:  EXAM: CTA HEAD NECK W WO CONT       CLINICAL INDICATIONS: Ataxia, stroke suspected as etiology       TECHNIQUE: Helical CT scan of the brain and neck were performed at 1.25 mm   intervals during rapid IV bolus contrast administration. These data were   reconstructed at .625 mm intervals for vascular analysis. The data was also   reviewed at 2.5 mm intervals for accompanying soft tissue analysis. 3D post   processed images, including surface shaded displays, were produced for this exam   on independent console, permanently archived and interpreted. All CT scans at this facility are performed using dose optimization technique as   appropriate to a performed exam, to include automated exposure control,   adjustment of the MA and/or kV according to patient size (including appropriate   matching for site-specific examinations) or use of  iterative reconstruction   technique.        CONTRAST: 100 cc Isovue 370       COMPARISON: CT scan head 8/12/2019       CTA SOFT TISSUE ANALYSIS:   Lungs: Clear   Upper chest: Unremarkable   Neck: Unremarkable   Lymph nodes: No adenopathy   Orbits: Unremarkable Paranasal sinuses: Clear   Brain: No hemorrhage or mass effect. Bones: Unremarkable for age. CTA NECK VASCULAR ANALYSIS:        Aortic arch: Left sided with typical configuration. Innominate: Patent       Right Subclavian: Patent   Left Subclavian: Patent       Right carotid:   -CCA: Patent   -ECA: Patent   -ICA: Patent. Estimated 0% stenosis of proximal ICA by NASCET criteria. No   evidence of dissection. Left carotid:   -CCA: Patent. -ECA: Patent. -ICA: Patent. Estimated 0% stenosis of proximal ICA by NASCET criteria. No   evidence of dissection. There is noted be mild soft plaque involving the left   MCA with approximately 30% stenosis at the left trifurcation. Right vertebral: Patent. no evidence of dissection. Left vertebral: Patent. No evidence of dissection. CTA BRAIN VASCULAR ANALYSIS:        Right anterior circulation:   -ICA: Patent   -CANDICE: Patent    -MCA: Patent       Left anterior circulation:   -ICA: Patent   -CANDICE: Patent    -MCA: Patent       -ACOM: Unremarkable   -PCOM: Patent       Posterior circulation:   -RVA: Patent   -LVA: Patent   -Basilar: Patent   -Right PCA: Patent   -Left PCA: Patent       Major dural venous sinuses: Unremarkable       Other: Sebaceous cyst involves the posterior left neck measuring 1.8 x 1.2 cm in   size. Multiple small scattered thyroid nodules are identified. Opacified right   maxillary sinus associated with chronic sinusitis. Moderate degenerative vertebral changes and disc disease of the cervical spine. 08/12/19 0802  CT HEAD WO CONT Final result    Impression:  IMPRESSION[de-identified]   1.  No acute intracranial pathology. No acute hemorrhage. 2. Mild chronic small vessel ischemic change is noted. Report called to ER at 8:09 AM.   Thank you for this referral.       Narrative:  CT HEAD WO CONT       History: Stroke       Technique: 5 mm axial images acquired through the brain.        CT scans at this facility are performed using dose optimization technique as   appropriate with performed exam, to include automated exposure control,   adjustment of mA and/or kV according to patient's size (including appropriate   matching for site-specific examinations), or use of iterative reconstruction   technique. Comparison: None       Findings:       Soft tissues: No significant findings. Skull base/calvarium: Unremarkable. Brain: There is no acute intracranial hemorrhage or territorial infarction. Mild   chronic ischemic change seen in the basal ganglia and the insula bilaterally. No   mass, hemorrhage or midline shift. No subdural hematoma. Gray-white matter   tissue differentiation remains intact. Ventricles and cisterns: Unremarkable for age. Orbits: Unremarkable. Paranasal Sinuses: Visualized portions of the right maxillary sinus demonstrate   mucosal opacification.         Other findings: None                 Gaurav Fleming   08/12/19   Pulmonary, Critical Care Medicine  [facility]

## 2019-08-12 NOTE — PROGRESS NOTES
ARU/IPR REFERRAL CONTACT NOTE  [address] [facility] [place] for [facility]    RE:  Ren Cassidy Thank you for the opportunity to review this patient's case for admission to [address] [facility] [place] for Physical Rehabilitation. Based on our pre-admission screening:     Caitie ] Our Team/Medical Director is following this case. Comments:  Awaiting therapy consults and recs when medically appropriate. Again, Thank you for this referral. Should you have any questions please do not hesitate to call. Sincerely,  YU Gamboa PTA for Sally Serrano, MAURO  Admissions [facility]  [phone]

## 2019-08-13 ENCOUNTER — APPOINTMENT (OUTPATIENT)
Dept: CT IMAGING | Age: 64
DRG: 062 | End: 2019-08-13
Attending: PHYSICIAN ASSISTANT
Payer: MEDICARE

## 2019-08-13 ENCOUNTER — APPOINTMENT (OUTPATIENT)
Dept: NON INVASIVE DIAGNOSTICS | Age: 64
DRG: 062 | End: 2019-08-13
Attending: NURSE PRACTITIONER
Payer: MEDICARE

## 2019-08-13 LAB
ANION GAP SERPL CALC-SCNC: 7 MMOL/L (ref 3–18)
BASOPHILS # BLD: 0 K/UL (ref 0–0.06)
BASOPHILS NFR BLD: 0 % (ref 0–3)
BUN SERPL-MCNC: 26 MG/DL (ref 7–18)
BUN/CREAT SERPL: 20 (ref 12–20)
CALCIUM SERPL-MCNC: 8.6 MG/DL (ref 8.5–10.1)
CHLORIDE SERPL-SCNC: 109 MMOL/L (ref 100–111)
CHOLEST SERPL-MCNC: 185 MG/DL
CK MB CFR SERPL CALC: 0.4 % (ref 0–4)
CK MB SERPL-MCNC: 1.5 NG/ML (ref 5–25)
CK SERPL-CCNC: 350 U/L (ref 39–308)
CO2 SERPL-SCNC: 28 MMOL/L (ref 21–32)
CREAT SERPL-MCNC: 1.32 MG/DL (ref 0.6–1.3)
DIFFERENTIAL METHOD BLD: ABNORMAL
ECHO AO ROOT DIAM: 3.36 CM
ECHO LA AREA 4C: 20.6 CM2
ECHO LA VOL 2C: 82.57 ML (ref 18–58)
ECHO LA VOL 4C: 51.33 ML (ref 18–58)
ECHO LA VOL BP: 70.21 ML (ref 18–58)
ECHO LA VOL/BSA BIPLANE: 32.51 ML/M2 (ref 16–28)
ECHO LA VOLUME INDEX A2C: 38.24 ML/M2 (ref 16–28)
ECHO LA VOLUME INDEX A4C: 23.77 ML/M2 (ref 16–28)
ECHO LV INTERNAL DIMENSION DIASTOLIC: 4.93 CM (ref 4.2–5.9)
ECHO LV INTERNAL DIMENSION SYSTOLIC: 3.27 CM
ECHO LV IVSD: 1.83 CM (ref 0.6–1)
ECHO LV MASS 2D: 477.9 G (ref 88–224)
ECHO LV MASS INDEX 2D: 221.3 G/M2 (ref 49–115)
ECHO LV POSTERIOR WALL DIASTOLIC: 1.64 CM (ref 0.6–1)
ECHO LVOT DIAM: 2.34 CM
ECHO LVOT PEAK GRADIENT: 6.6 MMHG
ECHO LVOT PEAK VELOCITY: 128.94 CM/S
ECHO LVOT VTI: 26.5 CM
ECHO MV A VELOCITY: 78.16 CM/S
ECHO MV E DECELERATION TIME (DT): 173.3 MS
ECHO MV E VELOCITY: 70.03 CM/S
ECHO MV E/A RATIO: 0.9
ECHO RV TAPSE: 2.32 CM (ref 1.5–2)
EOSINOPHIL # BLD: 0 K/UL (ref 0–0.4)
EOSINOPHIL NFR BLD: 0 % (ref 0–5)
ERYTHROCYTE [DISTWIDTH] IN BLOOD BY AUTOMATED COUNT: 14.7 % (ref 11.6–14.5)
EST. AVERAGE GLUCOSE BLD GHB EST-MCNC: 143 MG/DL
GLUCOSE SERPL-MCNC: 145 MG/DL (ref 74–99)
HBA1C MFR BLD: 6.6 % (ref 4.2–5.6)
HCT VFR BLD AUTO: 32 % (ref 36–48)
HDLC SERPL-MCNC: 42 MG/DL (ref 40–60)
HDLC SERPL: 4.4 {RATIO} (ref 0–5)
HGB BLD-MCNC: 10.4 G/DL (ref 13–16)
LDLC SERPL CALC-MCNC: 110.2 MG/DL (ref 0–100)
LIPID PROFILE,FLP: ABNORMAL
LYMPHOCYTES # BLD: 1.9 K/UL (ref 0.8–3.5)
LYMPHOCYTES NFR BLD: 18 % (ref 20–51)
MAGNESIUM SERPL-MCNC: 2.2 MG/DL (ref 1.6–2.6)
MCH RBC QN AUTO: 27.5 PG (ref 24–34)
MCHC RBC AUTO-ENTMCNC: 32.5 G/DL (ref 31–37)
MCV RBC AUTO: 84.7 FL (ref 74–97)
MONOCYTES # BLD: 0.3 K/UL (ref 0–1)
MONOCYTES NFR BLD: 3 % (ref 2–9)
NEUTS SEG # BLD: 8.4 K/UL (ref 1.8–8)
NEUTS SEG NFR BLD: 79 % (ref 42–75)
PHOSPHATE SERPL-MCNC: 2.5 MG/DL (ref 2.5–4.9)
PLATELET # BLD AUTO: 290 K/UL (ref 135–420)
PLATELET COMMENTS,PCOM: ABNORMAL
PMV BLD AUTO: 11.3 FL (ref 9.2–11.8)
POTASSIUM SERPL-SCNC: 2.4 MMOL/L (ref 3.5–5.5)
POTASSIUM SERPL-SCNC: 2.8 MMOL/L (ref 3.5–5.5)
RBC # BLD AUTO: 3.78 M/UL (ref 4.7–5.5)
RBC MORPH BLD: ABNORMAL
SODIUM SERPL-SCNC: 144 MMOL/L (ref 136–145)
TRIGL SERPL-MCNC: 164 MG/DL (ref ?–150)
TROPONIN I SERPL-MCNC: 0.04 NG/ML (ref 0–0.04)
TSH SERPL DL<=0.05 MIU/L-ACNC: 1.72 UIU/ML (ref 0.36–3.74)
VLDLC SERPL CALC-MCNC: 32.8 MG/DL
WBC # BLD AUTO: 10.6 K/UL (ref 4.6–13.2)

## 2019-08-13 PROCEDURE — 65660000000 HC RM CCU STEPDOWN

## 2019-08-13 PROCEDURE — 92526 ORAL FUNCTION THERAPY: CPT

## 2019-08-13 PROCEDURE — 83036 HEMOGLOBIN GLYCOSYLATED A1C: CPT

## 2019-08-13 PROCEDURE — 74011000250 HC RX REV CODE- 250: Performed by: INTERNAL MEDICINE

## 2019-08-13 PROCEDURE — 74011250636 HC RX REV CODE- 250/636: Performed by: PHYSICIAN ASSISTANT

## 2019-08-13 PROCEDURE — 74011250636 HC RX REV CODE- 250/636: Performed by: PSYCHIATRY & NEUROLOGY

## 2019-08-13 PROCEDURE — 74011250636 HC RX REV CODE- 250/636: Performed by: EMERGENCY MEDICINE

## 2019-08-13 PROCEDURE — 36415 COLL VENOUS BLD VENIPUNCTURE: CPT

## 2019-08-13 PROCEDURE — 97530 THERAPEUTIC ACTIVITIES: CPT

## 2019-08-13 PROCEDURE — 80061 LIPID PANEL: CPT

## 2019-08-13 PROCEDURE — 97162 PT EVAL MOD COMPLEX 30 MIN: CPT

## 2019-08-13 PROCEDURE — 92522 EVALUATE SPEECH PRODUCTION: CPT

## 2019-08-13 PROCEDURE — 84100 ASSAY OF PHOSPHORUS: CPT

## 2019-08-13 PROCEDURE — 83735 ASSAY OF MAGNESIUM: CPT

## 2019-08-13 PROCEDURE — 74011250637 HC RX REV CODE- 250/637: Performed by: INTERNAL MEDICINE

## 2019-08-13 PROCEDURE — 74011250637 HC RX REV CODE- 250/637: Performed by: PHYSICIAN ASSISTANT

## 2019-08-13 PROCEDURE — 82550 ASSAY OF CK (CPK): CPT

## 2019-08-13 PROCEDURE — 84443 ASSAY THYROID STIM HORMONE: CPT

## 2019-08-13 PROCEDURE — 80048 BASIC METABOLIC PNL TOTAL CA: CPT

## 2019-08-13 PROCEDURE — 97166 OT EVAL MOD COMPLEX 45 MIN: CPT

## 2019-08-13 PROCEDURE — 70450 CT HEAD/BRAIN W/O DYE: CPT

## 2019-08-13 PROCEDURE — 74011000258 HC RX REV CODE- 258: Performed by: PHYSICIAN ASSISTANT

## 2019-08-13 PROCEDURE — 85025 COMPLETE CBC W/AUTO DIFF WBC: CPT

## 2019-08-13 PROCEDURE — 97112 NEUROMUSCULAR REEDUCATION: CPT

## 2019-08-13 PROCEDURE — 93306 TTE W/DOPPLER COMPLETE: CPT

## 2019-08-13 RX ORDER — AMLODIPINE BESYLATE 10 MG/1
10 TABLET ORAL DAILY
Status: DISCONTINUED | OUTPATIENT
Start: 2019-08-13 | End: 2019-08-15 | Stop reason: HOSPADM

## 2019-08-13 RX ORDER — SODIUM CHLORIDE 0.9 % (FLUSH) 0.9 %
5-40 SYRINGE (ML) INJECTION EVERY 8 HOURS
Status: DISCONTINUED | OUTPATIENT
Start: 2019-08-13 | End: 2019-08-15 | Stop reason: HOSPADM

## 2019-08-13 RX ORDER — POTASSIUM CHLORIDE 1.5 G/1.77G
40 POWDER, FOR SOLUTION ORAL
Status: DISCONTINUED | OUTPATIENT
Start: 2019-08-13 | End: 2019-08-13

## 2019-08-13 RX ORDER — ASPIRIN 325 MG
325 TABLET ORAL DAILY
Status: DISCONTINUED | OUTPATIENT
Start: 2019-08-14 | End: 2019-08-15 | Stop reason: HOSPADM

## 2019-08-13 RX ORDER — HEPARIN SODIUM 5000 [USP'U]/ML
5000 INJECTION, SOLUTION INTRAVENOUS; SUBCUTANEOUS EVERY 8 HOURS
Status: DISCONTINUED | OUTPATIENT
Start: 2019-08-13 | End: 2019-08-15 | Stop reason: HOSPADM

## 2019-08-13 RX ORDER — SODIUM CHLORIDE 9 MG/ML
10 INJECTION INTRAMUSCULAR; INTRAVENOUS; SUBCUTANEOUS
Status: COMPLETED | OUTPATIENT
Start: 2019-08-13 | End: 2019-08-13

## 2019-08-13 RX ORDER — SODIUM CHLORIDE 9 MG/ML
10 INJECTION INTRAMUSCULAR; INTRAVENOUS; SUBCUTANEOUS ONCE
Status: DISCONTINUED | OUTPATIENT
Start: 2019-08-13 | End: 2019-08-13 | Stop reason: CLARIF

## 2019-08-13 RX ORDER — GEMFIBROZIL 600 MG/1
600 TABLET, FILM COATED ORAL
Status: DISCONTINUED | OUTPATIENT
Start: 2019-08-13 | End: 2019-08-14

## 2019-08-13 RX ORDER — METOPROLOL SUCCINATE 50 MG/1
50 TABLET, EXTENDED RELEASE ORAL DAILY
Status: DISCONTINUED | OUTPATIENT
Start: 2019-08-13 | End: 2019-08-15 | Stop reason: HOSPADM

## 2019-08-13 RX ORDER — POTASSIUM CHLORIDE 7.45 MG/ML
10 INJECTION INTRAVENOUS
Status: DISCONTINUED | OUTPATIENT
Start: 2019-08-13 | End: 2019-08-13

## 2019-08-13 RX ORDER — SODIUM CHLORIDE 0.9 % (FLUSH) 0.9 %
5-40 SYRINGE (ML) INJECTION AS NEEDED
Status: DISCONTINUED | OUTPATIENT
Start: 2019-08-13 | End: 2019-08-15 | Stop reason: HOSPADM

## 2019-08-13 RX ORDER — POTASSIUM CHLORIDE 1.5 G/1.77G
40 POWDER, FOR SOLUTION ORAL
Status: COMPLETED | OUTPATIENT
Start: 2019-08-13 | End: 2019-08-13

## 2019-08-13 RX ADMIN — Medication 10 ML: at 14:17

## 2019-08-13 RX ADMIN — POTASSIUM CHLORIDE: 2 INJECTION, SOLUTION, CONCENTRATE INTRAVENOUS at 07:09

## 2019-08-13 RX ADMIN — AMLODIPINE BESYLATE 10 MG: 10 TABLET ORAL at 12:08

## 2019-08-13 RX ADMIN — POTASSIUM CHLORIDE: 2 INJECTION, SOLUTION, CONCENTRATE INTRAVENOUS at 05:15

## 2019-08-13 RX ADMIN — METOPROLOL SUCCINATE 50 MG: 50 TABLET, EXTENDED RELEASE ORAL at 12:08

## 2019-08-13 RX ADMIN — POTASSIUM CHLORIDE 40 MEQ: 1.5 POWDER, FOR SOLUTION ORAL at 14:17

## 2019-08-13 RX ADMIN — POTASSIUM CHLORIDE: 2 INJECTION, SOLUTION, CONCENTRATE INTRAVENOUS at 06:04

## 2019-08-13 RX ADMIN — HEPARIN SODIUM 5000 UNITS: 5000 INJECTION INTRAVENOUS; SUBCUTANEOUS at 14:30

## 2019-08-13 RX ADMIN — GEMFIBROZIL 600 MG: 600 TABLET ORAL at 15:36

## 2019-08-13 RX ADMIN — SODIUM CHLORIDE 10 ML: 9 INJECTION INTRAMUSCULAR; INTRAVENOUS; SUBCUTANEOUS at 14:57

## 2019-08-13 RX ADMIN — POTASSIUM CHLORIDE: 2 INJECTION, SOLUTION, CONCENTRATE INTRAVENOUS at 03:50

## 2019-08-13 RX ADMIN — Medication 10 ML: at 14:18

## 2019-08-13 RX ADMIN — LABETALOL 20 MG/4 ML (5 MG/ML) INTRAVENOUS SYRINGE 10 MG: at 14:29

## 2019-08-13 RX ADMIN — Medication 10 ML: at 06:04

## 2019-08-13 RX ADMIN — Medication 10 ML: at 01:14

## 2019-08-13 RX ADMIN — POTASSIUM CHLORIDE: 2 INJECTION, SOLUTION, CONCENTRATE INTRAVENOUS at 04:04

## 2019-08-13 RX ADMIN — POTASSIUM CHLORIDE: 2 INJECTION, SOLUTION, CONCENTRATE INTRAVENOUS at 08:33

## 2019-08-13 NOTE — PROGRESS NOTES
Problem: Pressure Injury - Risk of  Goal: *Prevention of pressure injury  Description  Document Bartolome Scale and appropriate interventions in the flowsheet. Outcome: Progressing Towards Goal  Note:   Pressure Injury Interventions: Activity Interventions: PT/OT evaluation    Mobility Interventions: Assess need for specialty bed, PT/OT evaluation, Turn and reposition approx. every two hours(pillow and wedges)    Nutrition Interventions: Document food/fluid/supplement intake                     Problem: Patient Education: Go to Patient Education Activity  Goal: Patient/Family Education  Outcome: Progressing Towards Goal     Problem: Falls - Risk of  Goal: *Absence of Falls  Description  Document Candida Lightning Fall Risk and appropriate interventions in the flowsheet. Outcome: Progressing Towards Goal  Note:   Fall Risk Interventions:  Mobility Interventions: Bed/chair exit alarm         Medication Interventions: Bed/chair exit alarm    Elimination Interventions: Bed/chair exit alarm, Call light in reach, Toileting schedule/hourly rounds, Urinal in reach              Problem: Patient Education: Go to Patient Education Activity  Goal: Patient/Family Education  Outcome: Progressing Towards Goal     Problem: Patient Education: Go to Patient Education Activity  Goal: Patient/Family Education  Outcome: Progressing Towards Goal     Problem: TIA/CVA Stroke: 0-24 hours  Goal: *Absence of bleeding  Outcome: Progressing Towards Goal     Problem: Ischemic Stroke:  3-24 hours  Goal: *Initiate mobility  Outcome: Progressing Towards Goal  Goal: *Absence of aspiration  Outcome: Progressing Towards Goal     Problem: Diabetes Self-Management  Goal: *Disease process and treatment process  Description  Define diabetes and identify own type of diabetes; list 3 options for treating diabetes.   Outcome: Progressing Towards Goal  Goal: *Incorporating nutritional management into lifestyle  Description  Describe effect of type, amount and timing of food on blood glucose; list 3 methods for planning meals. Outcome: Progressing Towards Goal  Goal: *Incorporating physical activity into lifestyle  Description  State effect of exercise on blood glucose levels. Outcome: Progressing Towards Goal  Goal: *Developing strategies to promote health/change behavior  Description  Define the ABC's of diabetes; identify appropriate screenings, schedule and personal plan for screenings. Outcome: Progressing Towards Goal  Goal: *Using medications safely  Description  State effect of diabetes medications on diabetes; name diabetes medication taking, action and side effects. Outcome: Progressing Towards Goal  Goal: *Monitoring blood glucose, interpreting and using results  Description  Identify recommended blood glucose targets  and personal targets. Outcome: Progressing Towards Goal  Goal: *Prevention, detection, treatment of acute complications  Description  List symptoms of hyper- and hypoglycemia; describe how to treat low blood sugar and actions for lowering  high blood glucose level. Outcome: Progressing Towards Goal  Goal: *Prevention, detection and treatment of chronic complications  Description  Define the natural course of diabetes and describe the relationship of blood glucose levels to long term complications of diabetes.   Outcome: Progressing Towards Goal  Goal: *Developing strategies to address psychosocial issues  Description  Describe feelings about living with diabetes; identify support needed and support network  Outcome: Progressing Towards Goal  Goal: *Insulin pump training  Outcome: Progressing Towards Goal  Goal: *Sick day guidelines  Outcome: Progressing Towards Goal  Goal: *Patient Specific Goal (EDIT GOAL, INSERT TEXT)  Outcome: Progressing Towards Goal     Problem: Patient Education: Go to Patient Education Activity  Goal: Patient/Family Education  Outcome: Progressing Towards Goal

## 2019-08-13 NOTE — ROUTINE PROCESS
Bedside and Verbal shift change report given to Maliha Mari RN (oncoming nurse) by Najma Suarez RN (offgoing nurse). Report included the following information SBAR, Kardex, Intake/Output, MAR and Recent Results.

## 2019-08-13 NOTE — PROGRESS NOTES
[facility] Hospitalist Group  Progress Note    Patient: Sagar Ryder Age: 71 y.o. : 1955 MR#: [id number]: [id number]  Date/Time: 2019    Subjective:     Pt seen in f/u after intensivist notified me of his xfer out of ICU. Chart reviewed. Denies F/C, N/V, CP, SOB. Says his speech seems to be a bit improved, and he can move his RLE a bit (but barely), which is improved. Cannot move RUE. No HA, vis change, bruising. Spoke with pt's wife who was waiting in hallway. Assessment/Plan:   1. Acute CVA - s/p tPA. Appears stable with slight improvement in symptoms. Continue ASA, gemfibrozil, judicious BP control, PT/OT efforts. Declines statin use due to potential side effects. Dispo planning. 2. HTN - holding Hyzaar due to MARISSA. On home Norvasc and Toprol-XL dose. 3. HypoK+ - repleting. Mg2+ wnl. Recheck BMP in AM.   4. MARISSA - improved. Holding Hyzaar. Historically had nml renal fxn based on lab review in Care Everywhere system. Additional Notes:     CT head repeat:  IMPRESSION:  1.  2.5 x 1.6 cm infarct in the left basal ganglia. 2. No acute intracranial hemorrhage. 3. No significant mass effect or midline shift. Case discussed with:  [x]Patient  [x]Family  []Nursing  []Case Management  DVT Prophylaxis:  []Lovenox  [x]Hep SQ  []SCDs  []Coumadin   []On Heparin gtt    Objective:   VS:   Visit Vitals  /82   Pulse 77   Temp 98.4 °F (36.9 °C)   Resp 26   Ht 5' 9\" (1.753 m)   Wt 100.7 kg (222 lb)   SpO2 97%   BMI 32.78 kg/m²      Tmax/24hrs: Temp (24hrs), Av.2 °F (36.8 °C), Min:97.5 °F (36.4 °C), Max:98.8 °F (37.1 °C)    Input/Output:     Intake/Output Summary (Last 24 hours) at 2019 1702  Last data filed at 2019 1600  Gross per 24 hour   Intake 1142.92 ml   Output 1275 ml   Net -132.08 ml       General:  Awake, alert, NAD. Cardiovascular:  RRR. Pulmonary:  CTA B.  GI:  Soft, NT/ND, NABS. Extremities:  No CT or edema.  0/5 motor RUE, 2/5 motor RLE. 5/5 for LUE and LLE. Additional:  Dysarthric speech. Labs:    Recent Results (from the past 24 hour(s))   POTASSIUM    Collection Time: 08/12/19 11:28 PM   Result Value Ref Range    Potassium 2.4 (LL) 3.5 - 5.5 mmol/L   LIPID PANEL    Collection Time: 08/13/19  3:39 AM   Result Value Ref Range    LIPID PROFILE          Cholesterol, total 185 <200 MG/DL    Triglyceride 164 (H) <150 MG/DL    HDL Cholesterol 42 40 - 60 MG/DL    LDL, calculated 110.2 (H) 0 - 100 MG/DL    VLDL, calculated 32.8 MG/DL    CHOL/HDL Ratio 4.4 0 - 5.0     HEMOGLOBIN A1C WITH EAG    Collection Time: 08/13/19  3:39 AM   Result Value Ref Range    Hemoglobin A1c 6.6 (H) 4.2 - 5.6 %    Est. average glucose 143 mg/dL   CBC WITH AUTOMATED DIFF    Collection Time: 08/13/19 12:05 PM   Result Value Ref Range    WBC 10.6 4.6 - 13.2 K/uL    RBC 3.78 (L) 4.70 - 5.50 M/uL    HGB 10.4 (L) 13.0 - 16.0 g/dL    HCT 32.0 (L) 36.0 - 48.0 %    MCV 84.7 74.0 - 97.0 FL    MCH 27.5 24.0 - 34.0 PG    MCHC 32.5 31.0 - 37.0 g/dL    RDW 14.7 (H) 11.6 - 14.5 %    PLATELET 186 867 - 081 K/uL    MPV 11.3 9.2 - 11.8 FL    NEUTROPHILS 79 (H) 42 - 75 %    LYMPHOCYTES 18 (L) 20 - 51 %    MONOCYTES 3 2 - 9 %    EOSINOPHILS 0 0 - 5 %    BASOPHILS 0 0 - 3 %    ABS. NEUTROPHILS 8.4 (H) 1.8 - 8.0 K/UL    ABS. LYMPHOCYTES 1.9 0.8 - 3.5 K/UL    ABS. MONOCYTES 0.3 0 - 1.0 K/UL    ABS. EOSINOPHILS 0.0 0.0 - 0.4 K/UL    ABS.  BASOPHILS 0.0 0.0 - 0.06 K/UL    DF MANUAL      PLATELET COMMENTS ADEQUATE PLATELETS      RBC COMMENTS ANISOCYTOSIS  1+       METABOLIC PANEL, BASIC    Collection Time: 08/13/19 12:05 PM   Result Value Ref Range    Sodium 144 136 - 145 mmol/L    Potassium 2.8 (LL) 3.5 - 5.5 mmol/L    Chloride 109 100 - 111 mmol/L    CO2 28 21 - 32 mmol/L    Anion gap 7 3.0 - 18 mmol/L    Glucose 145 (H) 74 - 99 mg/dL    BUN 26 (H) 7.0 - 18 MG/DL    Creatinine 1.32 (H) 0.6 - 1.3 MG/DL    BUN/Creatinine ratio 20 12 - 20      GFR est AA >60 >60 ml/min/1.73m2    GFR est non-AA 54 (L) >60 ml/min/1.73m2    Calcium 8.6 8.5 - 10.1 MG/DL   MAGNESIUM    Collection Time: 19 12:05 PM   Result Value Ref Range    Magnesium 2.2 1.6 - 2.6 mg/dL   PHOSPHORUS    Collection Time: 19 12:05 PM   Result Value Ref Range    Phosphorus 2.5 2.5 - 4.9 MG/DL   CARDIAC PANEL,(CK, CKMB & TROPONIN)    Collection Time: 19 12:05 PM   Result Value Ref Range     (H) 39 - 308 U/L    CK - MB 1.5 <3.6 ng/ml    CK-MB Index 0.4 0.0 - 4.0 %    Troponin-I, QT 0.04 0.0 - 0.045 NG/ML   TSH 3RD GENERATION    Collection Time: 19 12:05 PM   Result Value Ref Range    TSH 1.72 0.36 - 3.74 uIU/mL   ECHO ADULT COMPLETE    Collection Time: 19  2:36 PM   Result Value Ref Range    LA Volume 70.21 18 - 58 mL    Tapse 2.32 (A) 1.5 - 2.0 cm    Ao Root D 3.36 cm    LVIDd 4.93 4.2 - 5.9 cm    LVPWd 1.64 (A) 0.6 - 1.0 cm    LVIDs 3.27 cm    IVSd 1.83 (A) 0.6 - 1.0 cm    LVOT d 2.34 cm    LVOT Peak Velocity 128.94 cm/s    LVOT Peak Gradient 6.6 mmHg    LVOT VTI 26.50 cm    MV A Federico 78.16 cm/s    MV E Federico 70.03 cm/s    MV E/A 0.90     LA Vol 4C 51.33 18 - 58 mL    LA Vol 2C 82.57 (A) 18 - 58 mL    LA Area 4C 20.6 cm2    LV Mass .9 (A) 88 - 224 g    LV Mass AL Index 221.3 (A) 49 - 115 g/m2    Mitral Valve E Wave Deceleration Time 173.3 ms    LA Vol Index 32.51 16 - 28 ml/m2    LA Vol Index 38.24 16 - 28 ml/m2    LA Vol Index 23.77 16 - 28 ml/m2     Additional Data Reviewed:      Signed By: Eduar Lester MD     2019 5:02 PM     [facility]  Progress Note    Patient: Darby Welch Age: 71 y.o. : 1955 MR#: [id number] SSN: [id number]  Date/Time: 2019    Subjective:         Assessment/Plan:   1.    Additional Notes:      Case discussed with:  []Patient  []Family  []Nursing  []Case Management  DVT Prophylaxis:  []Lovenox  []Hep SQ  []SCDs  []Coumadin   []On Heparin gtt    Objective:   VS:   Visit Vitals  /82   Pulse 77   Temp 98.4 °F (36.9 °C)   Resp 26   Ht 5' 9\" (1.753 m)   Wt 100.7 kg (222 lb)   SpO2 97%   BMI 32.78 kg/m²      Tmax/24hrs: Temp (24hrs), Av.2 °F (36.8 °C), Min:97.5 °F (36.4 °C), Max:98.8 °F (37.1 °C)    Input/Output:     Intake/Output Summary (Last 24 hours) at 2019 1702  Last data filed at 2019 1600  Gross per 24 hour   Intake 1142.92 ml   Output 1275 ml   Net -132.08 ml       General:    Cardiovascular:    Pulmonary:    GI:    Extremities: Additional:      Labs:    Recent Results (from the past 24 hour(s))   POTASSIUM    Collection Time: 19 11:28 PM   Result Value Ref Range    Potassium 2.4 (LL) 3.5 - 5.5 mmol/L   LIPID PANEL    Collection Time: 19  3:39 AM   Result Value Ref Range    LIPID PROFILE          Cholesterol, total 185 <200 MG/DL    Triglyceride 164 (H) <150 MG/DL    HDL Cholesterol 42 40 - 60 MG/DL    LDL, calculated 110.2 (H) 0 - 100 MG/DL    VLDL, calculated 32.8 MG/DL    CHOL/HDL Ratio 4.4 0 - 5.0     HEMOGLOBIN A1C WITH EAG    Collection Time: 19  3:39 AM   Result Value Ref Range    Hemoglobin A1c 6.6 (H) 4.2 - 5.6 %    Est. average glucose 143 mg/dL   CBC WITH AUTOMATED DIFF    Collection Time: 19 12:05 PM   Result Value Ref Range    WBC 10.6 4.6 - 13.2 K/uL    RBC 3.78 (L) 4.70 - 5.50 M/uL    HGB 10.4 (L) 13.0 - 16.0 g/dL    HCT 32.0 (L) 36.0 - 48.0 %    MCV 84.7 74.0 - 97.0 FL    MCH 27.5 24.0 - 34.0 PG    MCHC 32.5 31.0 - 37.0 g/dL    RDW 14.7 (H) 11.6 - 14.5 %    PLATELET 032 627 - 385 K/uL    MPV 11.3 9.2 - 11.8 FL    NEUTROPHILS 79 (H) 42 - 75 %    LYMPHOCYTES 18 (L) 20 - 51 %    MONOCYTES 3 2 - 9 %    EOSINOPHILS 0 0 - 5 %    BASOPHILS 0 0 - 3 %    ABS. NEUTROPHILS 8.4 (H) 1.8 - 8.0 K/UL    ABS. LYMPHOCYTES 1.9 0.8 - 3.5 K/UL    ABS. MONOCYTES 0.3 0 - 1.0 K/UL    ABS. EOSINOPHILS 0.0 0.0 - 0.4 K/UL    ABS.  BASOPHILS 0.0 0.0 - 0.06 K/UL    DF MANUAL      PLATELET COMMENTS ADEQUATE PLATELETS      RBC COMMENTS ANISOCYTOSIS  1+       METABOLIC PANEL, BASIC Collection Time: 08/13/19 12:05 PM   Result Value Ref Range    Sodium 144 136 - 145 mmol/L    Potassium 2.8 (LL) 3.5 - 5.5 mmol/L    Chloride 109 100 - 111 mmol/L    CO2 28 21 - 32 mmol/L    Anion gap 7 3.0 - 18 mmol/L    Glucose 145 (H) 74 - 99 mg/dL    BUN 26 (H) 7.0 - 18 MG/DL    Creatinine 1.32 (H) 0.6 - 1.3 MG/DL    BUN/Creatinine ratio 20 12 - 20      GFR est AA >60 >60 ml/min/1.73m2    GFR est non-AA 54 (L) >60 ml/min/1.73m2    Calcium 8.6 8.5 - 10.1 MG/DL   MAGNESIUM    Collection Time: 08/13/19 12:05 PM   Result Value Ref Range    Magnesium 2.2 1.6 - 2.6 mg/dL   PHOSPHORUS    Collection Time: 08/13/19 12:05 PM   Result Value Ref Range    Phosphorus 2.5 2.5 - 4.9 MG/DL   CARDIAC PANEL,(CK, CKMB & TROPONIN)    Collection Time: 08/13/19 12:05 PM   Result Value Ref Range     (H) 39 - 308 U/L    CK - MB 1.5 <3.6 ng/ml    CK-MB Index 0.4 0.0 - 4.0 %    Troponin-I, QT 0.04 0.0 - 0.045 NG/ML   TSH 3RD GENERATION    Collection Time: 08/13/19 12:05 PM   Result Value Ref Range    TSH 1.72 0.36 - 3.74 uIU/mL   ECHO ADULT COMPLETE    Collection Time: 08/13/19  2:36 PM   Result Value Ref Range    LA Volume 70.21 18 - 58 mL    Tapse 2.32 (A) 1.5 - 2.0 cm    Ao Root D 3.36 cm    LVIDd 4.93 4.2 - 5.9 cm    LVPWd 1.64 (A) 0.6 - 1.0 cm    LVIDs 3.27 cm    IVSd 1.83 (A) 0.6 - 1.0 cm    LVOT d 2.34 cm    LVOT Peak Velocity 128.94 cm/s    LVOT Peak Gradient 6.6 mmHg    LVOT VTI 26.50 cm    MV A Federico 78.16 cm/s    MV E Federico 70.03 cm/s    MV E/A 0.90     LA Vol 4C 51.33 18 - 58 mL    LA Vol 2C 82.57 (A) 18 - 58 mL    LA Area 4C 20.6 cm2    LV Mass .9 (A) 88 - 224 g    LV Mass AL Index 221.3 (A) 49 - 115 g/m2    Mitral Valve E Wave Deceleration Time 173.3 ms    LA Vol Index 32.51 16 - 28 ml/m2    LA Vol Index 38.24 16 - 28 ml/m2    LA Vol Index 23.77 16 - 28 ml/m2     Additional Data Reviewed:      Signed By: Eduar Lester MD     August 13, 2019 5:02 PM

## 2019-08-13 NOTE — PROGRESS NOTES
Problem: Self Care Deficits Care Plan (Adult)  Goal: *Acute Goals and Plan of Care (Insert Text)  Description  Occupational Therapy Goals  Initiated 8/13/2019 within 7 day(s). 1.  Patient will perform grooming with minimal assistance/contact guard assist.   2.  Patient will perform upper body dressing with minimal assistance/contact guard assist.  3.  Patient will perform lower body dressing with moderate assistance . 4. Patient will perform toilet transfers with moderate assistance . 5. Patient will participate in upper extremity therapeutic exercise/activities with minimal assistance/contact guard assist for 8 minutes to increase functional use of RUE for ADLs. Prior Level of Function: Pt was independent with basic self care tasks and used no AD for functional mobility PTA. Outcome: Progressing Towards Goal   OCCUPATIONAL THERAPY EVALUATION    Patient: Jorge Bruce (41 y.o. male)  Date: 8/13/2019  Primary Diagnosis: CVA (cerebral vascular accident) [facility]) [I63.9]        Precautions:   Fall    ASSESSMENT :  Based on the objective data described below, the patient presents with decreased ADLs, decreased functional mobility and muscle weakness/decreased functional use of his non-dominant RUE after CVA. He is s/p tPA and presents with right hemiplegia. Min difficulty verbalizing secondary to right facial droop. Patient is motivated to care for himself and participate in session. Max assist given for supine to sit towards stronger side. He attempted to don his left sock while seated on EOB but unable to maintain sitting balance or reach his toes without mod assist.  Mod assist x2 required for standing and transferring secondary to right upper and lower extremity weakness. Patient left seated in a recliner at the end of the session with RUE elevated. Nursing notified. Recommend continued therapy at inpatient rehab to maximize his functional independence for safe return to home.      Patient will benefit from skilled intervention to address the above impairments. Patient's rehabilitation potential is considered to be Good  Factors which may influence rehabilitation potential include:   ? None noted  ? Mental ability/status  ? Medical condition  ? Home/family situation and support systems  ? Safety awareness  ? Pain tolerance/management  ? Other:      PLAN :  Recommendations and Planned Interventions:   ?               Self Care Training                  ? Therapeutic Activities  ? Functional Mobility Training   ? Cognitive Retraining  ? Therapeutic Exercises           ? Endurance Activities  ? Balance Training                    ? Neuromuscular Re-Education  ? Visual/Perceptual Training     ? Home Safety Training  ? Patient Education                   ? Family Training/Education  ? Other (comment):    Frequency/Duration: Patient will be followed by occupational therapy 1-2 times per day/4-7 days per week to address goals. Discharge Recommendations: Inpatient Rehab  Further Equipment Recommendations for Discharge: TBD at next level of care     SUBJECTIVE:   Patient stated [facility] wife left for a little bit but I hope she'll be back soon.     OBJECTIVE DATA SUMMARY:     Past Medical History:   Diagnosis Date    Essential hypertension     Sleep apnea     on cpap     Past Surgical History:   Procedure Laterality Date    COLONOSCOPY N/A 4/15/2019    COLONOSCOPY performed by Nando Collazo MD at [facility] ENDOSCOPY    HX TONSILLECTOMY       Barriers to Learning/Limitations: None  Compensate with: visual, verbal, tactile, kinesthetic cues/model    Home Situation:   Home Situation  Home Environment: Private residence  # Steps to Enter: 2  Rails to Enter: No  One/Two Story Residence: One story  Living Alone: No  Support Systems: Spouse/Significant Other/Partner  Patient Expects to be Discharged to[de-identified] Private residence  Current DME Used/Available at Home: None  Tub or Shower Type: Tub/Shower combination  ? Right hand dominant   ? Left hand dominant    Cognitive/Behavioral Status:  Neurologic State: Alert  Orientation Level: Oriented X4  Cognition: Follows commands; Impulsive  Safety/Judgement: Fall prevention    Skin: Intact on UEs  Edema: None noted in UEs    Vision/Perceptual:    Acuity: Within Defined Limits    Corrective Lenses: Reading glasses    Coordination: BUE  Coordination: (Unable to test coordination on R LE but decreased on L LE)  Fine Motor Skills-Upper: Left Intact; Right Impaired    Gross Motor Skills-Upper: Left Intact; Right Impaired    Balance:  Sitting: Impaired; With support  Sitting - Static: Fair (occasional)  Sitting - Dynamic: Fair (occasional); Poor (constant support)  Standing: Impaired; With support  Standing - Static: Fair  Standing - Dynamic : Poor    Strength: BUE  Strength: Grossly decreased, non-functional(RUE 1/5, LUE 4/5)    Tone & Sensation: BUE  Tone: Abnormal(RUE)  Sensation: Intact    Range of Motion: BUE  AROM: Grossly decreased, non-functional(RUE; LUE WFL)  PROM: Within functional limits    Functional Mobility and Transfers for ADLs:  Bed Mobility:  Supine to Sit: Maximum assistance    Transfers:  Sit to Stand: Moderate assistance;Maximum assistance;Assist x2  Stand to Sit: Moderate assistance;Assist x2   Toilet Transfer : Moderate assistance;Assist x2    ADL Assessment:   Feeding: Setup;Supervision(using his dominant LUE)    Oral Facial Hygiene/Grooming: Moderate assistance    Bathing: Maximum assistance    Upper Body Dressing: Moderate assistance    Lower Body Dressing: Total assistance    Toileting: Maximum assistance    Neuromuscular Re-education:  Patient instructed on SROM exercises for his RUE while seated in chair and was able to return demonstrate with VCs for form.   He was also educated on sensory stimulation to his RUE and advised to perform hourly. Patient verbalized understanding. Pain:  Pain level pre-treatment: 0/10   Pain level post-treatment: 0/10   Pain Intervention(s): NA  Response to intervention: NA    Activity Tolerance:   Good  Please refer to the flowsheet for vital signs taken during this treatment. After treatment:   ? Patient left in no apparent distress sitting up in chair  ? Patient left in no apparent distress in bed  ? Call bell left within reach  ? Nursing notified  ? Caregiver present  ? Bed alarm activated    COMMUNICATION/EDUCATION:   ? Role of Occupational Therapy in the acute care setting  ? Home safety education was provided and the patient/caregiver indicated understanding. ? Patient/family have participated as able in goal setting and plan of care. ? Patient/family agree to work toward stated goals and plan of care. ? Patient understands intent and goals of therapy, but is neutral about his/her participation. ? Patient is unable to participate in goal setting and plan of care. Thank you for this referral.  Amanda Simons MS OTR/L   Time Calculation: 26 mins    Eval Complexity: History: MEDIUM Complexity : Expanded review of history including physical, cognitive and psychosocial  history ; Examination: MEDIUM Complexity : 3-5 performance deficits relating to physical, cognitive , or psychosocial skils that result in activity limitations and / or participation restrictions; Decision Making:MEDIUM Complexity : Patient may present with comorbidities that affect occupational performnce.  Miniml to moderate modification of tasks or assistance (eg, physical or verbal ) with assesment(s) is necessary to enable patient to complete evaluation

## 2019-08-13 NOTE — PROGRESS NOTES
Problem: Mobility Impaired (Adult and Pediatric)  Goal: *Acute Goals and Plan of Care (Insert Text)  Description  Physical Therapy Goals  Initiated 8/13/2019 and to be accomplished within 7 day(s)  1. Patient will move from supine to sit and sit to supine , scoot up and down and roll side to side in bed with minimal assistance/contact guard assist.     2.  Patient will transfer from bed to chair and chair to bed with minimal assistance/contact guard assist using the least restrictive device. 3.  Patient will perform sit to stand with minimal assistance/contact guard assist.  4.  Patient will ambulate with minimal assistance/contact guard assist for 50 feet with the least restrictive device. Prior Level of Function: Independent with mobility including gait no AD. Outcome: Progressing Towards Goal   PHYSICAL THERAPY EVALUATION    Patient: Asha Babin (39 y.o. male)  Date: 8/13/2019  Primary Diagnosis: CVA (cerebral vascular accident) [facility]) [I63.9]        Precautions:   Fall    ASSESSMENT :  PT orders received and patient cleared by nursing to participate with therapy. Patient is a 71 y.o. male admitted to the hospital due to R sided weakness and slurred speech. Pt has CVA with TPA given 8/12/19 at 0900 (PT orders received this morning). Patient consents to PT evaluation and treatment. Performed partial co-treatment with occupational therapy to increase participation, safety, and functional mobility. Sitting edge of bed with OT upon arrival. Pt has difficulty with dynamic sitting balance. Pt has weakness noted R side. R ankle 0/5, hamstrings 2-5, quads 0/5, and hips 2-/5. Pt has difficulty with coordination on L LE with toe taps. Sit to stands maximum assistance x2. Gait bed to chair maximum assistance x2. Pt may benefit from hemiwalker or platform walker next treatment session. Educated pt on discharge planning and need for increased assistance at this time.  Pt will benefit from lift team to return to bed later, nursing informed. Pt ended therapy sitting in recliner with all needs met. Patient will benefit from skilled intervention to address the above impairments. Patient's rehabilitation potential is considered to be Good  Factors which may influence rehabilitation potential include:    ? None noted  ? Mental ability/status  ? Medical condition  ? Home/family situation and support systems  ? Safety awareness  ? Pain tolerance/management  ? Other:      PLAN :  Recommendations and Planned Interventions:    ?           Bed Mobility Training             ? Neuromuscular Re-Education  ? Transfer Training                   ? Orthotic/Prosthetic Training  ? Gait Training                          ? Modalities  ? Therapeutic Exercises           ? Edema Management/Control  ? Therapeutic Activities            ? Family Training/Education  ? Patient Education  ? Other (comment):    Frequency/Duration: Patient will be followed by physical therapy 1-2 times per day/4-7 days per week to address goals. Discharge Recommendations: Inpatient Rehab  Further Equipment Recommendations for Discharge: N/A     SUBJECTIVE:   Patient stated No pain.     OBJECTIVE DATA SUMMARY:     Past Medical History:   Diagnosis Date    Essential hypertension     Sleep apnea     on cpap     Past Surgical History:   Procedure Laterality Date    COLONOSCOPY N/A 4/15/2019    COLONOSCOPY performed by Nando Collazo MD at [facility] ENDOSCOPY    HX TONSILLECTOMY       Barriers to Learning/Limitations: yes;  sensory deficits-vision/hearing/speech  Compensate with: Visual Cues, Verbal Cues and Tactile Cues  Home Situation:  Home Situation  Home Environment: Private residence  # Steps to Enter: 2  Rails to Enter: No  One/Two Story Residence: One story  Living Alone: No  Support Systems: Spouse/Significant Other/Partner  Patient Expects to be Discharged to[de-identified] Private residence  Current DME Used/Available at Home: None  Tub or Shower Type: Tub/Shower combination  Critical Behavior:  Neurologic State: Alert  Orientation Level: Oriented X4  Cognition: Follows commands; Impulsive  Safety/Judgement: Fall prevention  Psychosocial  Patient Behaviors: Calm; Cooperative  Purposeful Interaction: Yes  Pt Identified Daily Priority: Clinical issues (comment); Communication issues (comment)  Caritas Process: Nurture loving kindness  Caring Interventions: Reassure; Therapeutic modalities  Reassure: Therapeutic listening  Therapeutic Modalities: Intentional therapeutic touch  Skin Condition/Temp: Warm     Skin Integrity: Tear  Skin Integumentary  Skin Color: Ecchymosis (comment)  Skin Condition/Temp: Warm  Skin Integrity: Tear  Turgor: Epidermis thin w/ loss of subcut tissue  Hair Growth: Present  Varicosities: Absent     B LE Strength:    Strength: (R ankle 0/5, hamstrings 2-/5 quad 0/5, hip 2-/5; L LE 5/5)              B LE Tone & Sensation:   Tone: Abnormal          Sensation: Intact           B LE Range Of Motion:  AROM: (R LE decreased, non-functional;  L LE WFL)        PROM: Within functional limits        Posture:  Posture (WDL): Exceptions to WDL  Posture Assessment: Forward head  Functional Mobility:  Bed Mobility:              Transfers:  Sit to Stand: Moderate assistance;Maximum assistance;Assist x2   Stand to Sit: Moderate assistance;Assist x2                       Balance:   Sitting: Impaired; With support  Sitting - Static: Fair (occasional)  Sitting - Dynamic: Fair (occasional); Poor (constant support)  Standing: Impaired; With support  Standing - Static: Fair  Standing - Dynamic : Poor    Ambulation/Gait Training:  Distance (ft): 2 Feet (ft)  Assistive Device: (2 person assist; possible hemiwalker or platform walker)  Ambulation - Level of Assistance: Maximum assistance;Assist x2     Gait Abnormalities: Decreased step clearance(R knee buckle)        Base of Support: Center of gravity altered;Narrowed     Speed/Carlee: Slow  Step Length: Right shortened;Left shortened           Therapeutic Exercises:   Reviewed and performed ankle pumps to increase blood flow and circulation. Pain:  No pain noted before, during, or at end of session. Activity Tolerance:   fair  Please refer to the flowsheet for vital signs taken during this treatment. After treatment:   ?         Patient left in no apparent distress sitting up in chair  ? Patient left in no apparent distress in bed  ? Call bell left within reach  ? Personal items in reach   ? Nursing notified Luciana   ? Caregiver present  ? Bed/chair alarm activated  ? SCDs applied     COMMUNICATION/EDUCATION:   ?         Role of Physical Therapy in the acute care setting. ?         Fall prevention education was provided and the patient/caregiver indicated understanding. ? Patient/family have participated as able in goal setting and plan of care. ?         Patient/family agree to work toward stated goals and plan of care. ?         Patient understands intent and goals of therapy, but is neutral about his/her participation. ? Patient is unable to participate in goal setting/plan of care: ongoing with therapy staff. ?         Out of bed with nursing assistance. ? Other:     Thank you for this referral.  Valerie Webster, PT, DPT   Time Calculation: 29 mins      Eval Complexity: History: MEDIUM  Complexity : 1-2 comorbidities / personal factors will impact the outcome/ POC Exam:HIGH Complexity : 4+ Standardized tests and measures addressing body structure, function, activity limitation and / or participation in recreation  Presentation: MEDIUM Complexity : Evolving with changing characteristics  Clinical Decision Making:Medium Complexity    Overall Complexity:MEDIUM

## 2019-08-13 NOTE — PROGRESS NOTES
Speech Therapy Note:     Follow up attempted. Could not progress with ST intervention because patient:       []  Lethargic, unable to be alerted enough for safe PO intake  []  Refused participation  [x]  Off the unit for CT  []  NPO for procedure  []  Other:      SLP will re-attempt later this date. Discussed with pt's wife.       Bryan Green M.S., [address] [facility]  Speech-Language Pathologist

## 2019-08-13 NOTE — ROUTINE PROCESS
Bedside and Verbal shift change report given to [address] [place] [address] (oncoming nurse) by Babs Cassidy RN(offgoing nurse). Report given with SBAR, Kardex, Intake/Output, MAR, Accordion and Recent Results.

## 2019-08-13 NOTE — ROUTINE PROCESS
Assumed care of pt at shift change, A&OX4. Vital signs stable and telemetry SR 1st deg PVC's. PIV sites b/l A/C # 18's are clean,dry,intact and patent. Medications tolerated IV without difficultly. Lungs clear and diminished on r/a. O2 %. ABD soft with + BS x 4 quadrants. LBM PTA. Voided 525 cc of yellow urine via urinal. Skin warm,dry and intact with a right arm skin tear. No  edema to B/L lower extremities. No c/o pain or discomfort at this time. In bed resting. Slept well during the night, no acute distress noted. NIH scale Q 1 hour. Pt score is 11 but changed to 12 when pt was unable to move right leg. Cardene drip stopped at 0100 for 's - 140's.

## 2019-08-13 NOTE — PROGRESS NOTES
[facility]. Pulmonary, Critical Care, and Sleep Medicine    Name: Marisol Painting MRN: [id number]   : 1955 Hospital: [address] [place] [address] Dr   Date: 2019  Admission Date: 2019     Chart and notes reviewed. Data reviewed. I have evaluated all findings. [x]I have reviewed the flowsheet and previous days notes. Interval HPI:    Patient is a 71 y.o. male with pmhx of HTN who presented to the ED for evaluation of right leg weakness, ataxia, slurred speech and right sided facial droop. Per patient's wife, yesterday around 2 [place] began experiencing the above stated symptoms. She states that she got him into the car and gave him some water and after approximately 10 minutes his symptoms resolved. She states that he had a sleep study over night last night and returned home at 4 am this morning. She states that at that time he was asymptomatic. She reports around 7 am when she and the patient woke up and got out of bed he was exhibiting slurred speech, right sided facial droop, ataxia and right leg weakness again and was brought to the ED. Patient evaluated by teleneurology. CT head completed showing no acute intracranial process. Teleneuro recommended tPA which was administered at approximately 0900. Patient was started on a nicardipine gtt for tighter blood pressure control as his SBP >200. CTA head and neck completed w/ formal read pending. Upon arrival to the ICU, patient A&O x3 w/ slurred speech, facial droop and new onset right arm weakness. Patient states that it started approximately an hour PTA to the ICU. On nicardipine gtt. Denies dizziness, lightheadedness, blurred vision, chest pain, abdominal pain, SOB, N/V. Notable labs on admission included K+ of 2.4, Cr of 1.54, and troponin of 0.04.     19   Repeat CT head 24 hours after tPA w/o intracranial hemorrhage noted  Mentation: A&O x3.  Dysarthria improved from yesterday but still present, facial droop and left arm weakness noted. Respiratory/ Secretions: protecting airway  Hemodynamics: stable  Urine output: adequate  Need for procedures: N/A              ROS:A comprehensive review of systems was negative except for that written in the HPI. Events and notes from last 24 hours reviewed. Care plan discussed on multidisciplinary rounds. Patient Active Problem List   Diagnosis Code    Severe obesity ([facility] E66.01    CVA (cerebral vascular accident) ([facility] I63.9       Vital Signs:  Visit Vitals  /87   Pulse 86   Temp 98.5 °F (36.9 °C)   Resp 17   Ht 5' 9\" (1.753 m)   Wt 100.9 kg (222 lb 7.1 oz)   SpO2 97%   BMI 32.85 kg/m²       O2 Device: CPAP mask       Temp (24hrs), Av.5 °F (36.9 °C), Min:98.3 °F (36.8 °C), Max:98.8 °F (37.1 °C)       Intake/Output:   Last shift:      No intake/output data recorded. Last 3 shifts:  1901 -  0700  In: 1382.9 [P.O.:960; I.V.:422.9]  Out: 1475 [Urine:1475]    Intake/Output Summary (Last 24 hours) at 2019 0841  Last data filed at 2019 0600  Gross per 24 hour   Intake 1382.92 ml   Output 1475 ml   Net -92.08 ml        Current Facility-Administered Medications   Medication Dose Route Frequency    niCARdipine (CARDENE) 25 mg in 0.9% sodium chloride 250 mL infusion  0-15 mg/hr IntraVENous TITRATE    sodium chloride (NS) flush 5-40 mL  5-40 mL IntraVENous Q8H       Telemetry: NSR    Physical Exam:      General:  Alert, cooperative, NAD   Head:  R facial droop, abnormality, atraumatic. Eyes:  Conjunctivae/corneas clear. PERRL,   Nose: Nares normal. Septum midline. Mucosa normal.    Throat: Lips, mucosa, and tongue normal. Teeth and gums normal.   Neck: Supple, symmetrical, trachea midline, no adenopathy   Lungs:   Symmetrical chest rise; good AE bilat; CTAB; no wheezes/rhonchi/rales noted. Heart:  RRR, S1, S2 normal, no m/r/g   Abdomen:   Soft, non-tender. Bowel sounds normal. No masses,  No organomegaly.    Extremities: Extremities normal, atraumatic, no cyanosis or edema. Pulses: 2+ and symmetric all extremities. Skin: Skin color, texture, turgor normal. No rashes or lesions   Neurologic: R facial droop, slurred speech, Strength 5/5 in LUE, 1/5 in RUE, strenght 5/5 in BLE   Devices:  PIVs         DATA:  MAR reviewed and pertinent medications noted or modified as needed    Labs:  Recent Labs     08/12/19  0820   WBC 9.1   HGB 11.1*   HCT 33.6*        Recent Labs     08/12/19  2328 08/12/19  0820 08/12/19  0800   NA  --  143  --    K 2.4* 2.4*  --    CL  --  106  --    CO2  --  26  --    GLU  --  154*  --    BUN  --  26*  --    CREA  --  1.54*  --    CA  --  9.2  --    MG  --   --  2.3   PHOS  --   --  3.0     No results for input(s): PH, PCO2, PO2, HCO3, FIO2 in the last 72 hours. No results for input(s): FIO2I, IFO2, HCO3I, IHCO3, HCOPOC, PCO2I, PCOPOC, IPHI, PHI, PHPOC, PO2I, PO2POC in the last 72 hours. No lab exists for component: IPOC2    Imaging:  [x]   I have personally reviewed the patients radiographs and reports  XR Results (most recent):  No results found for this or any previous visit. CT Results (most recent):  Results from Hospital Encounter encounter on 08/12/19   CTA HEAD NECK W WO CONT    Narrative EXAM: CTA HEAD NECK W WO CONT    CLINICAL INDICATIONS: Ataxia, stroke suspected as etiology    TECHNIQUE: Helical CT scan of the brain and neck were performed at 1.25 mm  intervals during rapid IV bolus contrast administration. These data were  reconstructed at .625 mm intervals for vascular analysis. The data was also  reviewed at 2.5 mm intervals for accompanying soft tissue analysis. 3D post  processed images, including surface shaded displays, were produced for this exam  on independent console, permanently archived and interpreted.     All CT scans at this facility are performed using dose optimization technique as  appropriate to a performed exam, to include automated exposure control,  adjustment of the MA and/or kV according to patient size (including appropriate  matching for site-specific examinations) or use of  iterative reconstruction  technique. CONTRAST: 100 cc Isovue 370    COMPARISON: CT scan head 8/12/2019    CTA SOFT TISSUE ANALYSIS:  Lungs: Clear  Upper chest: Unremarkable  Neck: Unremarkable  Lymph nodes: No adenopathy  Orbits: Unremarkable  Paranasal sinuses: Clear  Brain: No hemorrhage or mass effect. Bones: Unremarkable for age. CTA NECK VASCULAR ANALYSIS:     Aortic arch: Left sided with typical configuration. Innominate: Patent    Right Subclavian: Patent  Left Subclavian: Patent    Right carotid:  -CCA: Patent  -ECA: Patent  -ICA: Patent. Estimated 0% stenosis of proximal ICA by NASCET criteria. No  evidence of dissection. Left carotid:  -CCA: Patent. -ECA: Patent. -ICA: Patent. Estimated 0% stenosis of proximal ICA by NASCET criteria. No  evidence of dissection. There is noted be mild soft plaque involving the left  MCA with approximately 30% stenosis at the left trifurcation. Right vertebral: Patent. no evidence of dissection. Left vertebral: Patent. No evidence of dissection. CTA BRAIN VASCULAR ANALYSIS:     Right anterior circulation:  -ICA: Patent  -CANDICE: Patent   -MCA: Patent    Left anterior circulation:  -ICA: Patent  -CANDICE: Patent   -MCA: Patent    -ACOM: Unremarkable  -PCOM: Patent    Posterior circulation:  -RVA: Patent  -LVA: Patent  -Basilar: Patent  -Right PCA: Patent  -Left PCA: Patent    Major dural venous sinuses: Unremarkable    Other: Sebaceous cyst involves the posterior left neck measuring 1.8 x 1.2 cm in  size. Multiple small scattered thyroid nodules are identified. Opacified right  maxillary sinus associated with chronic sinusitis. Moderate degenerative vertebral changes and disc disease of the cervical spine. Impression Impression:  1. Patent intra- and extracranial cerebral arteries.  No evidence of high-grade  stenosis or acute dissection. 2. There is atherosclerotic disease with mild stenosis of the left MCA at the  left trifurcation. 3. Other incidental findings as described above. Thank you for enabling us to participate in the care of this patient. IMPRESSION:   · CVA S/P tPA - R sided facial droop, slurred speech, ataxia and R leg weakness noted. tPA administered approximately 0900 8/12. MRI showed small infarct to left posterior basal ganglia. CT head today w/o signs of intracranial hemorrhage. · Hypokalemia - K+ 2.4 after replacement yesterday. Replaced over night with repeat pending  · MARISSA - creatinine elevated from baseline per care everywhere, most recent Cr from last week was 1.1     Patient Active Problem List   Diagnosis Code    Severe obesity ([facility] E66.01    CVA (cerebral vascular accident) ([facility] I63.9        RECOMMENDATIONS:   · Resp: Comfortable on RA, strict aspiration precautions HOB >30'. Monitor airway. · I/D: Afebrile; aleukocytosis; trend WBCs and temp curve  · Hem/Onc: Daily CBC; H/H, and plts are stable  · CVS: HD stable; off nicardipine. PO amlodipine and metoprolol restarted. · Metabolic: Daily BMP; monitor e-lytes; replace PRN  · Renal: Trend Renal indices; monitor I/O  · Endocrine: POC Glucose q6; Check TSH level  · GI: NPO, cleared for thickened liquids, will be re-evaluated by speech today. · Musc/Skin: No acute issues  · Neuro: frequent neuro checks. PT/OT, out of bed. · Code Status: full code     Best practice :    Glycemic control  DVT prophylaxis - SCDs  Need for Lines, perkins assessed. Restraints need. High complexity decision making was performed during this consultation and evaluation. [x]       Pt is at high risk for further organ failure and dysfunction.      Cesario Billingsley PA-C   08/13/19   Pulmonary, Critical Care Medicine  [facility]      3.35 pm    Dr. Virginia Murphy evaluated patient and recommends transfer to Neuro floor.  Discussed with Dr. Michael Shahid-hospitalist admitted with except the patient. Plan is to continue with progression of post stroke care. Patient's oral antihypertensives have been resumed. Can resume aspirin at appropriate time  Does not need continued stress ulcer prophylaxis. Can continue SCDs  Family has been updated this morning about care plan including need for aggressive post stroke rehab. Patient can continue BiPAP for sleep apnea does have a home device.   PCCM will sign off and defer further care to hospitalist team and neurologist    Aminata Lewis MD

## 2019-08-13 NOTE — PROGRESS NOTES
PT orders received, chart reviewed. Pt unable to participate with PT due to pt on continuous bedrest orders. Please remove to participate in therapy. Will f/u later as patient's schedule allows.  Thank you for this referral.  Huma Ontiveros, PT, DPT

## 2019-08-13 NOTE — PROGRESS NOTES
Stroke Education provided to patient and spouse/SO and the following topics were discussed    1. Patients personal risk factors for stroke are hypertension and obesity    2. Warning signs of Stroke:        * Sudden numbness or weakness of the face, arm or leg, especially on one side of          The body            * Sudden confusion, trouble speaking or understanding        * Sudden trouble seeing in one or both eyes        * Sudden trouble walking, dizziness, loss of balance or coordination        * Sudden severe headache with no known cause      3. Importance of activation Emergency Medical Services ( 9-1-1 ) immediately if experience any warning signs of stroke. 4. Be sure and schedule a follow-up appointment with your primary care doctor or any specialists as instructed. 5. You must take medicine every day to treat your risk factors for stroke. Be sure to take your medicines exactly as your doctor tells you: no more, no less. Know what your medicines are for , what they do. Anti-thrombotics /anticoagulants can help prevent strokes. You are taking the following medicine(s)       6.  Smoking and second-hand smoke greatly increase your risk of stroke, cardiovascular disease and death. Smoking history ended year 5    7. Information provided was [facility] Stroke Education Binder    8. Documentation of teaching completed in Patient Education Activity and on Care Plan with teaching response noted?   yes

## 2019-08-13 NOTE — PROGRESS NOTES
Problem: Motor Speech Impaired (Adult)  Goal: *Acute Goals and Plan of Care (Insert Text)  Description  Pt will:    1. Utilize compensatory strategies (decrease rate, overarticulate, increase intensity) to increase intelligibility to >80% at conversation level with min A visual/verbal cues in 4/5 trials. 2. Perform oral motor exercises (with and without resistance) in therapy and at home to increase oral motor strength/range-of-motion for articulation tasks with min A with visual/verbal cues in 4/5 trials. 3. Complete articulatory agility tasks (reading-conversation) with min A with visual/verbal cues in 4/5 trials. Outcome: Progressing Towards Goal      SPEECH-LANGUAGE PATHOLOGY EVALUATION    Patient: Savi Carrasco (67 y.o. male)  Date: 8/13/2019  Primary Diagnosis: CVA (cerebral vascular accident) [facility]) [I63.9]  Precautions: Aspiration,  Fall  PLOF: Speech/voice within normal limits    ASSESSMENT :  Based on the objective data described below, the patient presents with mod dysarthria characterized by decreased breath support, blended word boundaries and imprecise articulation impacting functional communication and independence. Oral mech exam revealed R facial droop with decreased labial seal and decreased orolingual strength/coordination. Speech intelligibility judged to be ~70% in conversation, increased to ~100% at the word level. Pt able to sustain vowel for ~10 seconds; decreased vocal intensity noted. Pt able to improve intelligibility given cues for decreased rate, exaggerated articulation and increased vocal intensity. Pt provided handout of speech oral motor exercises and compensatory strategies for improved intelligibility, able to verbalize understanding. Will continue to follow. Pt will benefit from ongoing SLP following discharge to address deficits. D/w pt, pt's wife and RN. Patient will benefit from skilled intervention to address the above impairments.   Patient's rehabilitation potential is considered to be Excellent  Factors which may influence rehabilitation potential include:   ? None noted  ? Mental ability/status  ? Medical condition  ? Home/family situation and support systems  ? Safety awareness  ? Pain tolerance/management  ? Other:      PLAN :  Recommendations and Planned Interventions:  As above   Frequency/Duration: Patient will be followed by speech-language pathology 1-2 times per day/4-7 days per week to address goals. Discharge Recommendations: Inpatient Rehab     SUBJECTIVE:   Patient stated I worked with the other therapists David Hua. OBJECTIVE:     Past Medical History:   Diagnosis Date    Essential hypertension     Sleep apnea     on cpap     Past Surgical History:   Procedure Laterality Date    COLONOSCOPY N/A 4/15/2019    COLONOSCOPY performed by Nando Collazo MD at [facility] ENDOSCOPY    HX TONSILLECTOMY       Prior Level of Function/Home Situation:  Home Situation  Home Environment: Private residence  # Steps to Enter: 2  Rails to Enter: No  One/Two Story Residence: One story  Living Alone: No  Support Systems: Spouse/Significant Other/Partner  Patient Expects to be Discharged to[de-identified] Private residence  Current DME Used/Available at Home: None  Tub or Shower Type: Tub/Shower combination  Mental Status:  Neurologic State: Alert  Orientation Level: Oriented X4  Cognition: Follows commands, Impulsive  Perception: Appears intact  Perseveration: No perseveration noted  Safety/Judgement: Fall prevention  Motor Speech:  Oral-Motor Structure/Motor Speech  Labial: Right droop; Impaired coordination;Decreased seal  Lingual: Incoordinated  Dysarthric Characteristics: Increased rate; Imprecise;Phonation/respiration incoordination;Decreased breath support;Blended word boundaries  Intelligibility: Impaired  Word Intelligibility (%): 100 %  Phrase Intelligibility (%): 80 %  Conversation Intelligibility (%): 70 %  Overall Impairment Severity: Moderate  Compensatory Strategies for Motor Speech: (Increased volume, Exaggerated artic, Decreased rate)  Language Comprehension and Expression:  WNL  Neuro-Linguistics: WNL  Voice:  Decreased vocal intensity  Able to sustain vowel for 10 seconds     PAIN:  Start of Eval: 0  End of Eval: 0     After treatment:   ?              Patient left in no apparent distress sitting up in chair  ? Patient left in no apparent distress in bed  ? Call bell left within reach  ? Nursing notified  ? Caregiver present  ? Bed alarm activated    COMMUNICATION/EDUCATION:   ? Patient/family have participated as able in goal setting and plan of care. ?  Patient/family agree to work toward stated goals and plan of care. ?  Patient understands intent and goals of therapy, but is neutral about his/her participation. ?   Patient is unable to participate in goal setting and plan of care; education ongoing with interdisciplinary staff    Thank you for this referral,   Chriss Butcher M.S., [address] [facility]  Speech-Language Pathologist

## 2019-08-13 NOTE — PROGRESS NOTES
0710 Bedside verbal report received from [address] rn. Pt resting in bed no s/s of acute distress. Dual skin/ NIHSS completed. 0800 Physical assesment completed at this time. 0855 Pt off the floor for CT scan.  0925 Back on the floor. 1910 Bedside verbal shift change report given to Robbin (oncoming nurse) by Llaa Alcala (offgoing nurse). Report included the following information SBAR, Kardex, Intake/Output and MAR.

## 2019-08-13 NOTE — PROGRESS NOTES
Re:  Yokasta Rios,Follow up visit     8/13/2019 1:57 PM    SSN: [id number]    Subjective:   Frederick Santiago is seen in follow up, his daughter is present. He has no complaints.       Medications:    Current Facility-Administered Medications   Medication Dose Route Frequency Provider Last Rate Last Dose    amLODIPine (NORVASC) tablet 10 mg  10 mg Oral DAILY Wen Flores PA-C   10 mg at 08/13/19 1208    metoprolol succinate (TOPROL-XL) XL tablet 50 mg  50 mg Oral DAILY Wen Flores PA-C   50 mg at 08/13/19 1208    gemfibrozil (LOPID) tablet 600 mg  600 mg Oral ACB&D Marc Klein MD        potassium chloride (KLOR-CON) packet for solution 40 mEq  40 mEq Oral NOW Néstor Beach PA-C        perflutren lipid microspheres (DEFINITY) contrast injection 2 mL  2 mL IntraVENous ONCE Gertrudis Ravi MD        0.9% NaCl bacteriostatic (NORMAL SALINE) 0.9 % injection 10 mL  10 mL IntraVENous ONCE Marc Klein MD        labetalol (NORMODYNE;TRANDATE) 20 mg/4 mL (5 mg/mL) injection 10 mg  10 mg IntraVENous Q10MIN PRN Vipul Becker DO        niCARdipine (CARDENE) 25 mg in 0.9% sodium chloride 250 mL infusion  0-15 mg/hr IntraVENous TITRATE Denita Becker DO   Stopped at 08/13/19 0110    sodium chloride (NS) flush 5-40 mL  5-40 mL IntraVENous Q8H Mor Coffey NP   10 mL at 08/13/19 0604    sodium chloride (NS) flush 5-40 mL  5-40 mL IntraVENous PRN Mor Coffey NP           Vital signs:    Visit Vitals  BP (!) 173/93   Pulse 72   Temp 97.5 °F (36.4 °C)   Resp 20   Ht 5' 9\" (1.753 m)   Wt 100.7 kg (222 lb)   SpO2 97%   BMI 32.78 kg/m²       Review of Systems:   As above otherwise 11 point review of systems negative including;   Constitutional no fever or chills  Skin denies rash or itching  HEENT  Denies tinnitus, hearing lose  Eyes denies diplopia vision lose  Respiratory denies sortness of breath  Cardiovascular denies chest pain, dyspnea on exertion  Gastrointestinal denies nausea, vomiting, diarrhea, constipation  Genitourinary denies incontinence  Musculoskeletal denies joint pain or swelling  Endocrine denies weight change  Hematology denies easy bruising or bleeding   Neurological as above in HPI      Patient Active Problem List   Diagnosis Code    Severe obesity ([facility] [address] E66.01    [facility] I63.9         Objective: The patient is awake, alert, and oriented x 4. Fund of knowledge is adequate. Speech is slurred but fluent and memory is intact. Cranial Nerves: II  Visual fields are full to confrontation. III, IV, VI  Extraocular movements are intact. There is no nystagmus. V  Facial sensation is intact to pinprick. VII  Face is asymmetrical, dense right facial.  VIII - Hearing is present. IX, X, XII  Palate is symmetrical.   XI - Shoulder shrugging and head turning intact  Motor: The patient has a dense right arm plegia, about 2/5 in the leg. Left side is 5/5. Tone is normal. Reflexes are 2+ and symmetrical. Plantars are up going on the right. Gait is not testable. Final result (Exam End: 8/12/2019 15:30) Provider Status: Open   Study Result     Brain MR without contrast     History: Stroke; presented with dysarthria yesterday, multiple episodes; also  with some right facial droop; has been treated with IV TPA; new onset right  upper extremity weakness     Comparison: Head CT earlier same date     Technique: Multisequence multiplanar MR imaging acquired through the brain. Includes diffusion imaging and heme sensitive imaging.     Contrast used: None     Findings:      Parenchyma: There is a 3 cm area of diffusion restriction at the left posterior  basal ganglia to corona radiata. Associated signal reduction on ADC map. Faintly  manifest on T2/FLAIR imaging. Small focus of T2 signal increase in the right  paramedian jeromy. No acute hemorrhage.  No mass lesion.      CSF spaces: Ventricles and cisterns remain midline in position     IAC regions: Unremarkable     Parasellar region: Unremarkable     Vasculature: Appropriate flow voids within the major skull base vasculature.     Cervicomedullary junction: Patent     Orbits: Unremarkable     Paranasal sinuses: Complete opacification right maxillary sinus. No expansile or  destructive change.            IMPRESSION  IMPRESSION:     1.  Study is positive for a small region of acute/early subacute infarct at the  left posterior basal ganglia to corona radiata. No hemorrhage or mass effect.     2. Information submitted to the consulting neurologist Dr. Zeyad Velasco via the [facility] system upon interpretation 8/12/2019 at 1540 hours.     Thank you for enabling us to participate in the care of this patient.        CT HEAD WO CONT     History: 24 hr post tPA, stroke     Technique: 5 mm axial images acquired through the brain.     CT scans at this facility are performed using dose optimization technique as  appropriate with performed exam, to include automated exposure control,  adjustment of mA and/or kV according to patient's size (including appropriate  matching for site-specific examinations), or use of iterative reconstruction  technique.     Comparison: CT scan head 8/12/2019, MRI brain 8/12/2019     Findings:     Soft tissues: No significant findings.     Skull base/calvarium: Unremarkable.     Brain: There is been interval development of low density changes of the  posterior left basal ganglia extending the left body of caudate. This measure up  to 2.5 x 1.6 cm in size, as seen on previous MRI, and is consistent with acute  infarct. No significant mass effect or midline shift. No acute intracranial  hemorrhage. No subdural hematoma.   There is a good gray white matter tissue differentiation elsewhere without other  areas of infarct noted.     Ventricles and cisterns: Unremarkable for age.     Orbits: Unremarkable.      Paranasal Sinuses: Opacified right maxillary sinus again identified.      Other findings: None     IMPRESSION  IMPRESSION[de-identified]  1.  2.5 x 1.6 cm infarct in the left basal ganglia. 2. No acute intracranial hemorrhage. 3. No significant mass effect or midline shift.     Thank you for this referral.    I have reviewed the above imagines myself.     CBC:   Lab Results   Component Value Date/Time    WBC 10.6 08/13/2019 12:05 PM    RBC 3.78 (L) 08/13/2019 12:05 PM    HGB 10.4 (L) 08/13/2019 12:05 PM    HCT 32.0 (L) 08/13/2019 12:05 PM    PLATELET 199 87/15/0175 12:05 PM     BMP:   Lab Results   Component Value Date/Time    Glucose 145 (H) 08/13/2019 12:05 PM    Sodium 144 08/13/2019 12:05 PM    Potassium 2.8 (LL) 08/13/2019 12:05 PM    Chloride 109 08/13/2019 12:05 PM    CO2 28 08/13/2019 12:05 PM    BUN 26 (H) 08/13/2019 12:05 PM    Creatinine 1.32 (H) 08/13/2019 12:05 PM    Calcium 8.6 08/13/2019 12:05 PM     CMP:   Lab Results   Component Value Date/Time    Glucose 145 (H) 08/13/2019 12:05 PM    Sodium 144 08/13/2019 12:05 PM    Potassium 2.8 (LL) 08/13/2019 12:05 PM    Chloride 109 08/13/2019 12:05 PM    CO2 28 08/13/2019 12:05 PM    BUN 26 (H) 08/13/2019 12:05 PM    Creatinine 1.32 (H) 08/13/2019 12:05 PM    Calcium 8.6 08/13/2019 12:05 PM    Anion gap 7 08/13/2019 12:05 PM    BUN/Creatinine ratio 20 08/13/2019 12:05 PM     Coagulation: No results found for: PTP, INR, APTT, PTTT  Cardiac markers:   Lab Results   Component Value Date/Time     (H) 08/13/2019 12:05 PM    CK-MB Index 0.4 08/13/2019 12:05 PM     8/13/2019  4:37 AM - Cedric, Lab In [facility]     Component Value Flag Ref Range Units Status   Hemoglobin A1c 6.6  High   4.2 - 5.6 % Final   Comment:     8/13/2019  5:10 AM - C[facility], Lab In [facility]     Component Value Flag Ref Range Units Status   LIPID PROFILE          Final   Cholesterol, total 185   <200 MG/DL Final   Triglyceride 164  High   <150 MG/DL Final   Comment:   The drugs N-acetylcysteine (NAC) and   Metamiszole have been found to cause falsely   low results in this chemical assay. Please   be sure to submit blood samples obtained   BEFORE administration of either of these   drugs to assure correct results. HDL Cholesterol 42   40 - 60 MG/DL Final   LDL, calculated 110.2  High   0 - 100 MG/DL Final   VLDL, calculated 32.8    MG/DL Final   CHOL/HDL Ratio 4.4   0 - 5.0   Final         Assessment:  New left lacune CVA who has risk factors including hypertension, diabetes. Please note patient refuses statin agent because of potential side effects. Plan:  Needs rehab. OK to leave the unit and go to floor. Sincerely,        Jarrett Avila.  Zeyad Velasco M.D.

## 2019-08-13 NOTE — PROGRESS NOTES
Reason for Admission:  CVA (cerebral vascular accident) ([facility] [I63.9]                 RRAT Score:    13         Plan for utilizing home health: To be determined                      Likelihood of Readmission:   Moderate                         Do you (patient/family) have any concerns for transition/discharge?  no    Transition of Care Plan:       Initial assessment completed with patient and spouse Misti Cognitive status of patient: oriented to time, place, person and situation. Face sheet information confirmed:  yes. The patient designates his wife Michale Suárez to participate in his discharge plan and to receive any needed information. This patient lives in a  home with wife. Patient is able to navigate steps as needed. Prior to hospitalization, patient was considered to be independent with ADLs/IADLS : yes . Patient has a current ACP document on file: no  The patient and wife will be available to transport patient home upon discharge. The patient already has  CPAP medical equipment available in the home. Patient is not currently active with home health. Patient has not stayed in a skilled nursing facility or rehab. Was  stay within last 60 days : no. This patient is on dialysis :no  Currently, the discharge plan is to be determined. Pt has PT/OT and rehab consult. Case Management is following to ensure a safe discharge. The patient states that he can obtain his medications from the pharmacy, and take his medications as directed. Patient's current insurance is [employer], [address] [place] [address] Management Interventions  PCP Verified by CM: No(per pt his pcp is Brendan Hansen and he saw her last week)  Palliative Care Criteria Met (RRAT>21 & CHF Dx)?: No  Mode of Transport at Discharge:  Other (see comment)(family)  Transition of Care Consult (CM Consult): Discharge Planning  Physical Therapy Consult: Yes  Occupational Therapy Consult: Yes  Speech Therapy Consult: Yes  Current Support Network: Lives with Spouse  Confirm Follow Up Transport: Family  Plan discussed with Pt/Family/Caregiver: Yes  Discharge Location  Discharge Placement: Unable to determine at this time      SHAY Hawkins RN  Care Management  Pager: [phone]

## 2019-08-13 NOTE — PROGRESS NOTES
ARU/IPR REFERRAL CONTACT NOTE  [address] [place] [address] Physical Rehabilitation    RE: Noemi William    Referral received to review this patient's case for admission to [address] [place] [address] Physical Rehabilitation. Current status reviewed.  When appropriate, will need PT/OT evaluation/treatment on this patient to complete the pre-admission evaluation.  Will continue to follow. Thank you for this referral.  Should you have any questions please do not hesitate to call. Sincerely,  Jordan Romo.  [address] [place] [address] Physical Rehabilitation  [phone]

## 2019-08-13 NOTE — PROGRESS NOTES
Problem: Dysphagia (Adult)  Goal: *Acute Goals and Plan of Care (Insert Text)  Description  Patient will:  1. Tolerate PO trials with 0 s/s overt distress in 4/5 trials  2. Utilize compensatory swallow strategies/maneuvers (decrease bite/sip, size/rate, alt. liq/sol) with min cues in 4/5 trials  3. Perform oral-motor/laryngeal exercises to increase oropharyngeal swallow function with min cues  4. Complete an objective swallow study (i.e., MBSS) to assess swallow integrity, r/o aspiration, and determine of safest LRD, min A as indicated/ordered by MD     Recommend:   Regular diet with nectar thick liquids  NO STRAWS  Meds with nectar thick liquids or in puree   Aspiration precautions  HOB >45 degrees during all intake and for at least 30 min after po   Small bites/sips, slow rate of intake, alternating bites/sips  Oral care three times daily  MBS when appropriate      Outcome: Progressing Towards Goal    SPEECH LANGUAGE PATHOLOGY DYSPHAGIA TREATMENT    Patient: Asha Babin (6 y.o. male)  Date: 8/13/2019  Diagnosis: CVA (cerebral vascular accident) [facility]) [I63.9]   Precautions: Aspiration, Fall  PLOF: Regular diet with thin liquids     ASSESSMENT:  Pt seen for follow up dysphagia tx seated upright in chair with wife present at bedside. Pt observed with [facility] soft, NTL lunch tray. Pt demo adequate bolus manipulation/control with solids, demo increased rate of intake with meal requiring min cues for decreased rate of intake. Pt able to tolerate regular solid trials with only min oral residue post swallow. Pt tolerating nectar thick liquids by cup with no overt s/sx aspiration. Pt demo immediate weak cough with nectar thick liquids + straw and thin liquids by cup despite effortful swallow and small sips. Recommend advance diet to regular diet with nectar thick liquids, no straws with strict use of the above mentioned compensatory strategies/aspiration precautions. Recommend MBS when appropriate. Results/recommendations discussed with pt, pt's wife and RN. Will continue to follow for further dysphagia management during this admission. Pt will benefit from extensive SLP treatment at discharge to address deficits. Progression toward goals:  ?         Improving appropriately and progressing toward goals  ? Improving slowly and progressing toward goals  ? Not making progress toward goals and plan of care will be adjusted     PLAN:  Recommendations and Planned Interventions:  MBS when appropriate   Patient continues to benefit from skilled intervention to address the above impairments. Continue treatment per established plan of care. Discharge Recommendations:  Inpatient Rehab     SUBJECTIVE:   Patient stated I just don't want to end up in a nursing home. OBJECTIVE:   Cognitive and Communication Status:  Neurologic State: Alert  Orientation Level: Oriented X4  Cognition: Follows commands, Impulsive  Perception: Appears intact  Perseveration: No perseveration noted  Safety/Judgement: Fall prevention  Dysphagia Treatment:  Oral Assessment:  Oral Assessment  Labial: Right droop, Impaired coordination, Decreased seal  Dentition: Natural  Oral Hygiene: Good  Lingual: Incoordinated  Velum: Unable to visualize  Mandible: No impairment  P.O. Trials:   Patient Position: [facility] at [facility]   Vocal quality prior to P.O.: Low volume, Breathy   Consistency Presented:  Thin liquid, Nectar thick liquid, Solid, Mechanical soft   How Presented: Self-fed/presented, SLP-fed/presented, Spoon, Straw, Successive swallows   Bolus Acceptance: No impairment   Bolus Formation/Control: Impaired   Type of Impairment: Mastication, Delayed   Propulsion: Delayed (# of seconds)   Oral Residue:  Less than 5% oral spread post swallow   Initiation of Swallow: Delayed (# of seconds)   Laryngeal Elevation: Decreased   Aspiration Signs/Symptoms: Weak cough(With thin liquids and nectar thick liquids + straw )   Pharyngeal Phase Characteristics: Effortful swallow   Effective Modifications: Small sips and bites, Cup/sip   Cues for Modifications: Minimal   Oral Phase Severity: Mild   Pharyngeal Phase Severity : Moderate     PAIN:  Start of Tx: 0  End of Tx: 0     After treatment:   ?              Patient left in no apparent distress sitting up in chair  ? Patient left in no apparent distress in bed  ? Call bell left within reach  ? Nursing notified  ? Family present  ? Caregiver present  ? Bed alarm activated      COMMUNICATION/EDUCATION:   ? Aspiration precautions; swallow safety; compensatory techniques  ? Patient/family able to participate in training and education   ? Patient unable to participate in training and education, education ongoing with staff   ?  Patient understands goals and intent of therapy; neutral about participation     Maximino Reynoso M.S., [address] [facility]  Speech-Language Pathologist

## 2019-08-14 ENCOUNTER — APPOINTMENT (OUTPATIENT)
Dept: GENERAL RADIOLOGY | Age: 64
DRG: 062 | End: 2019-08-14
Attending: INTERNAL MEDICINE
Payer: MEDICARE

## 2019-08-14 ENCOUNTER — TELEPHONE (OUTPATIENT)
Dept: NEUROLOGY | Age: 64
End: 2019-08-14

## 2019-08-14 LAB
ANION GAP SERPL CALC-SCNC: 8 MMOL/L (ref 3–18)
ANION GAP SERPL CALC-SCNC: 9 MMOL/L (ref 3–18)
BASOPHILS # BLD: 0 K/UL (ref 0–0.1)
BASOPHILS # BLD: 0.1 K/UL (ref 0–0.1)
BASOPHILS NFR BLD: 0 % (ref 0–2)
BASOPHILS NFR BLD: 1 % (ref 0–2)
BUN SERPL-MCNC: 26 MG/DL (ref 7–18)
BUN SERPL-MCNC: 28 MG/DL (ref 7–18)
BUN/CREAT SERPL: 20 (ref 12–20)
BUN/CREAT SERPL: 20 (ref 12–20)
CALCIUM SERPL-MCNC: 8.1 MG/DL (ref 8.5–10.1)
CALCIUM SERPL-MCNC: 8.3 MG/DL (ref 8.5–10.1)
CHLORIDE SERPL-SCNC: 108 MMOL/L (ref 100–111)
CHLORIDE SERPL-SCNC: 110 MMOL/L (ref 100–111)
CHOLEST SERPL-MCNC: 173 MG/DL
CO2 SERPL-SCNC: 26 MMOL/L (ref 21–32)
CO2 SERPL-SCNC: 27 MMOL/L (ref 21–32)
CREAT SERPL-MCNC: 1.31 MG/DL (ref 0.6–1.3)
CREAT SERPL-MCNC: 1.39 MG/DL (ref 0.6–1.3)
DIFFERENTIAL METHOD BLD: ABNORMAL
DIFFERENTIAL METHOD BLD: ABNORMAL
EOSINOPHIL # BLD: 0.3 K/UL (ref 0–0.4)
EOSINOPHIL # BLD: 0.3 K/UL (ref 0–0.4)
EOSINOPHIL NFR BLD: 2 % (ref 0–5)
EOSINOPHIL NFR BLD: 3 % (ref 0–5)
ERYTHROCYTE [DISTWIDTH] IN BLOOD BY AUTOMATED COUNT: 14.4 % (ref 11.6–14.5)
ERYTHROCYTE [DISTWIDTH] IN BLOOD BY AUTOMATED COUNT: 14.6 % (ref 11.6–14.5)
FERRITIN SERPL-MCNC: 34 NG/ML (ref 8–388)
FOLATE SERPL-MCNC: 7.1 NG/ML (ref 3.1–17.5)
GLUCOSE SERPL-MCNC: 126 MG/DL (ref 74–99)
GLUCOSE SERPL-MCNC: 148 MG/DL (ref 74–99)
HCT VFR BLD AUTO: 30.7 % (ref 36–48)
HCT VFR BLD AUTO: 31.6 % (ref 36–48)
HDLC SERPL-MCNC: 39 MG/DL (ref 40–60)
HDLC SERPL: 4.4 {RATIO} (ref 0–5)
HGB BLD-MCNC: 10 G/DL (ref 13–16)
HGB BLD-MCNC: 9.8 G/DL (ref 13–16)
IRON SATN MFR SERPL: 6 %
IRON SERPL-MCNC: 22 UG/DL (ref 50–175)
LDLC SERPL CALC-MCNC: 94 MG/DL (ref 0–100)
LIPID PROFILE,FLP: ABNORMAL
LYMPHOCYTES # BLD: 1.8 K/UL (ref 0.9–3.6)
LYMPHOCYTES # BLD: 1.8 K/UL (ref 0.9–3.6)
LYMPHOCYTES NFR BLD: 11 % (ref 21–52)
LYMPHOCYTES NFR BLD: 17 % (ref 21–52)
MAGNESIUM SERPL-MCNC: 2.2 MG/DL (ref 1.6–2.6)
MCH RBC QN AUTO: 26.7 PG (ref 24–34)
MCH RBC QN AUTO: 27.1 PG (ref 24–34)
MCHC RBC AUTO-ENTMCNC: 31.6 G/DL (ref 31–37)
MCHC RBC AUTO-ENTMCNC: 31.9 G/DL (ref 31–37)
MCV RBC AUTO: 84.5 FL (ref 74–97)
MCV RBC AUTO: 84.8 FL (ref 74–97)
MONOCYTES # BLD: 0.6 K/UL (ref 0.05–1.2)
MONOCYTES # BLD: 0.6 K/UL (ref 0.05–1.2)
MONOCYTES NFR BLD: 4 % (ref 3–10)
MONOCYTES NFR BLD: 6 % (ref 3–10)
NEUTS SEG # BLD: 13.7 K/UL (ref 1.8–8)
NEUTS SEG # BLD: 8.1 K/UL (ref 1.8–8)
NEUTS SEG NFR BLD: 73 % (ref 40–73)
NEUTS SEG NFR BLD: 83 % (ref 40–73)
PHOSPHATE SERPL-MCNC: 4.4 MG/DL (ref 2.5–4.9)
PLATELET # BLD AUTO: 286 K/UL (ref 135–420)
PLATELET # BLD AUTO: 286 K/UL (ref 135–420)
PMV BLD AUTO: 11.3 FL (ref 9.2–11.8)
PMV BLD AUTO: 11.5 FL (ref 9.2–11.8)
POTASSIUM SERPL-SCNC: 2.7 MMOL/L (ref 3.5–5.5)
POTASSIUM SERPL-SCNC: 2.8 MMOL/L (ref 3.5–5.5)
RBC # BLD AUTO: 3.62 M/UL (ref 4.7–5.5)
RBC # BLD AUTO: 3.74 M/UL (ref 4.7–5.5)
RETICS/RBC NFR AUTO: 1.4 % (ref 0.5–2.3)
SODIUM SERPL-SCNC: 143 MMOL/L (ref 136–145)
SODIUM SERPL-SCNC: 145 MMOL/L (ref 136–145)
TIBC SERPL-MCNC: 371 UG/DL (ref 250–450)
TRIGL SERPL-MCNC: 200 MG/DL (ref ?–150)
VIT B12 SERPL-MCNC: 208 PG/ML (ref 211–911)
VLDLC SERPL CALC-MCNC: 40 MG/DL
WBC # BLD AUTO: 10.9 K/UL (ref 4.6–13.2)
WBC # BLD AUTO: 16.4 K/UL (ref 4.6–13.2)

## 2019-08-14 PROCEDURE — 74011000258 HC RX REV CODE- 258: Performed by: HOSPITALIST

## 2019-08-14 PROCEDURE — 74011000255 HC RX REV CODE- 255: Performed by: INTERNAL MEDICINE

## 2019-08-14 PROCEDURE — 92507 TX SP LANG VOICE COMM INDIV: CPT

## 2019-08-14 PROCEDURE — 74011250637 HC RX REV CODE- 250/637: Performed by: PSYCHIATRY & NEUROLOGY

## 2019-08-14 PROCEDURE — 83540 ASSAY OF IRON: CPT

## 2019-08-14 PROCEDURE — 80048 BASIC METABOLIC PNL TOTAL CA: CPT

## 2019-08-14 PROCEDURE — 92526 ORAL FUNCTION THERAPY: CPT

## 2019-08-14 PROCEDURE — 74011250637 HC RX REV CODE- 250/637: Performed by: INTERNAL MEDICINE

## 2019-08-14 PROCEDURE — 74011000258 HC RX REV CODE- 258: Performed by: INTERNAL MEDICINE

## 2019-08-14 PROCEDURE — 51798 US URINE CAPACITY MEASURE: CPT

## 2019-08-14 PROCEDURE — 82728 ASSAY OF FERRITIN: CPT

## 2019-08-14 PROCEDURE — 82607 VITAMIN B-12: CPT

## 2019-08-14 PROCEDURE — 77030037878 HC DRSG MEPILEX >48IN BORD MOLN -B

## 2019-08-14 PROCEDURE — 74011250636 HC RX REV CODE- 250/636: Performed by: PSYCHIATRY & NEUROLOGY

## 2019-08-14 PROCEDURE — 84100 ASSAY OF PHOSPHORUS: CPT

## 2019-08-14 PROCEDURE — 80061 LIPID PANEL: CPT

## 2019-08-14 PROCEDURE — 85025 COMPLETE CBC W/AUTO DIFF WBC: CPT

## 2019-08-14 PROCEDURE — 74011250636 HC RX REV CODE- 250/636: Performed by: HOSPITALIST

## 2019-08-14 PROCEDURE — 36415 COLL VENOUS BLD VENIPUNCTURE: CPT

## 2019-08-14 PROCEDURE — 74011250636 HC RX REV CODE- 250/636: Performed by: NURSE PRACTITIONER

## 2019-08-14 PROCEDURE — 94660 CPAP INITIATION&MGMT: CPT

## 2019-08-14 PROCEDURE — 92611 MOTION FLUOROSCOPY/SWALLOW: CPT

## 2019-08-14 PROCEDURE — 85045 AUTOMATED RETICULOCYTE COUNT: CPT

## 2019-08-14 PROCEDURE — 65660000000 HC RM CCU STEPDOWN

## 2019-08-14 PROCEDURE — 97110 THERAPEUTIC EXERCISES: CPT

## 2019-08-14 PROCEDURE — 74011250637 HC RX REV CODE- 250/637: Performed by: PHYSICIAN ASSISTANT

## 2019-08-14 PROCEDURE — 94762 N-INVAS EAR/PLS OXIMTRY CONT: CPT

## 2019-08-14 PROCEDURE — 74230 X-RAY XM SWLNG FUNCJ C+: CPT

## 2019-08-14 PROCEDURE — 83735 ASSAY OF MAGNESIUM: CPT

## 2019-08-14 PROCEDURE — 97530 THERAPEUTIC ACTIVITIES: CPT

## 2019-08-14 PROCEDURE — 74011250636 HC RX REV CODE- 250/636: Performed by: INTERNAL MEDICINE

## 2019-08-14 RX ORDER — POTASSIUM CHLORIDE 1.5 G/1.77G
40 POWDER, FOR SOLUTION ORAL 2 TIMES DAILY WITH MEALS
Status: DISCONTINUED | OUTPATIENT
Start: 2019-08-14 | End: 2019-08-15 | Stop reason: HOSPADM

## 2019-08-14 RX ORDER — HYDRALAZINE HYDROCHLORIDE 20 MG/ML
10 INJECTION INTRAMUSCULAR; INTRAVENOUS
Status: DISCONTINUED | OUTPATIENT
Start: 2019-08-14 | End: 2019-08-15 | Stop reason: HOSPADM

## 2019-08-14 RX ORDER — LANOLIN ALCOHOL/MO/W.PET/CERES
400 CREAM (GRAM) TOPICAL DAILY
Status: DISCONTINUED | OUTPATIENT
Start: 2019-08-14 | End: 2019-08-15 | Stop reason: HOSPADM

## 2019-08-14 RX ORDER — ATORVASTATIN CALCIUM 40 MG/1
80 TABLET, FILM COATED ORAL
Status: DISCONTINUED | OUTPATIENT
Start: 2019-08-14 | End: 2019-08-15 | Stop reason: HOSPADM

## 2019-08-14 RX ORDER — ACETAMINOPHEN 325 MG/1
650 TABLET ORAL
Status: DISCONTINUED | OUTPATIENT
Start: 2019-08-14 | End: 2019-08-15 | Stop reason: HOSPADM

## 2019-08-14 RX ORDER — HYDRALAZINE HYDROCHLORIDE 50 MG/1
50 TABLET, FILM COATED ORAL 3 TIMES DAILY
Status: DISCONTINUED | OUTPATIENT
Start: 2019-08-14 | End: 2019-08-15 | Stop reason: HOSPADM

## 2019-08-14 RX ORDER — ONDANSETRON 2 MG/ML
4 INJECTION INTRAMUSCULAR; INTRAVENOUS
Status: DISCONTINUED | OUTPATIENT
Start: 2019-08-14 | End: 2019-08-15 | Stop reason: HOSPADM

## 2019-08-14 RX ADMIN — Medication 10 ML: at 00:28

## 2019-08-14 RX ADMIN — POTASSIUM CHLORIDE 40 MEQ: 1.5 POWDER, FOR SOLUTION ORAL at 20:11

## 2019-08-14 RX ADMIN — BARIUM SULFATE 30 ML: 400 PASTE ORAL at 13:20

## 2019-08-14 RX ADMIN — BARIUM SULFATE 30 ML: 400 SUSPENSION ORAL at 13:20

## 2019-08-14 RX ADMIN — POTASSIUM CHLORIDE: 2 INJECTION, SOLUTION, CONCENTRATE INTRAVENOUS at 11:00

## 2019-08-14 RX ADMIN — POTASSIUM CHLORIDE: 2 INJECTION, SOLUTION, CONCENTRATE INTRAVENOUS at 21:21

## 2019-08-14 RX ADMIN — ATORVASTATIN CALCIUM 80 MG: 40 TABLET, FILM COATED ORAL at 22:08

## 2019-08-14 RX ADMIN — GEMFIBROZIL 600 MG: 600 TABLET ORAL at 08:34

## 2019-08-14 RX ADMIN — BARIUM SULFATE 700 MG: 700 TABLET ORAL at 13:20

## 2019-08-14 RX ADMIN — HEPARIN SODIUM 5000 UNITS: 5000 INJECTION INTRAVENOUS; SUBCUTANEOUS at 06:47

## 2019-08-14 RX ADMIN — POTASSIUM CHLORIDE: 2 INJECTION, SOLUTION, CONCENTRATE INTRAVENOUS at 22:09

## 2019-08-14 RX ADMIN — POTASSIUM CHLORIDE: 2 INJECTION, SOLUTION, CONCENTRATE INTRAVENOUS at 15:30

## 2019-08-14 RX ADMIN — Medication 10 ML: at 06:49

## 2019-08-14 RX ADMIN — HEPARIN SODIUM 5000 UNITS: 5000 INJECTION INTRAVENOUS; SUBCUTANEOUS at 15:00

## 2019-08-14 RX ADMIN — HYDRALAZINE HYDROCHLORIDE 50 MG: 50 TABLET, FILM COATED ORAL at 16:34

## 2019-08-14 RX ADMIN — ASPIRIN 325 MG: 325 TABLET, FILM COATED ORAL at 08:34

## 2019-08-14 RX ADMIN — Medication 400 MG: at 20:10

## 2019-08-14 RX ADMIN — Medication 10 ML: at 22:09

## 2019-08-14 RX ADMIN — POTASSIUM CHLORIDE: 2 INJECTION, SOLUTION, CONCENTRATE INTRAVENOUS at 11:57

## 2019-08-14 RX ADMIN — HEPARIN SODIUM 5000 UNITS: 5000 INJECTION INTRAVENOUS; SUBCUTANEOUS at 22:08

## 2019-08-14 RX ADMIN — AMLODIPINE BESYLATE 10 MG: 10 TABLET ORAL at 08:34

## 2019-08-14 RX ADMIN — METOPROLOL SUCCINATE 50 MG: 50 TABLET, EXTENDED RELEASE ORAL at 08:34

## 2019-08-14 RX ADMIN — BARIUM SULFATE 45 G: 960 POWDER, FOR SUSPENSION ORAL at 13:20

## 2019-08-14 RX ADMIN — Medication 10 ML: at 16:35

## 2019-08-14 RX ADMIN — BARIUM SULFATE 90 ML: 400 SUSPENSION ORAL at 13:20

## 2019-08-14 RX ADMIN — POTASSIUM CHLORIDE: 2 INJECTION, SOLUTION, CONCENTRATE INTRAVENOUS at 13:45

## 2019-08-14 RX ADMIN — HYDRALAZINE HYDROCHLORIDE 50 MG: 50 TABLET, FILM COATED ORAL at 22:08

## 2019-08-14 RX ADMIN — POTASSIUM CHLORIDE: 2 INJECTION, SOLUTION, CONCENTRATE INTRAVENOUS at 16:30

## 2019-08-14 RX ADMIN — HEPARIN SODIUM 5000 UNITS: 5000 INJECTION INTRAVENOUS; SUBCUTANEOUS at 00:27

## 2019-08-14 RX ADMIN — HYDRALAZINE HYDROCHLORIDE 10 MG: 20 INJECTION, SOLUTION INTRAMUSCULAR; INTRAVENOUS at 20:24

## 2019-08-14 NOTE — PROGRESS NOTES
Problem: Mobility Impaired (Adult and Pediatric)  Goal: *Acute Goals and Plan of Care (Insert Text)  Description  Physical Therapy Goals  Initiated 8/13/2019 and to be accomplished within 7 day(s)  1. Patient will move from supine to sit and sit to supine , scoot up and down and roll side to side in bed with minimal assistance/contact guard assist.     2.  Patient will transfer from bed to chair and chair to bed with minimal assistance/contact guard assist using the least restrictive device. 3.  Patient will perform sit to stand with minimal assistance/contact guard assist.  4.  Patient will ambulate with minimal assistance/contact guard assist for 50 feet with the least restrictive device. Prior Level of Function: Independent with mobility including gait no AD. Outcome: Progressing Towards Goal       PHYSICAL THERAPY TREATMENT    Patient: Jorge Bruce (77 y.o. male)  Date: 8/14/2019  Diagnosis: CVA (cerebral vascular accident) [facility]) [I63.9]   Precautions: Fall    ASSESSMENT:  Patient presents today alert and agreeable to therapy and was supine in bed upon arrival. Patient transferred to sitting EOB with VC's to protect and attend to right UE while transferring towards right. Patient required VC's to weight shift for scooting while sitting EOB and assistance to maintain RUE on R knee for input and activation. Patient performed lateral weight shifting onto forearms (see below) and used mirror to ensure he attained midline from each weight shift. Patient then performed 2 standing trials apprx 45sec and 1.5-2mins respectively. Patient required verbal, tactile, and manual cues to activate glutes, lift gaze to mirror, and attempt to achieve TKE. Patient demos lean towards right with right knee block and able to correct with ModA. With fatigue, right trunk lean exacerbates.  Patient used karthik-walker in left hand and required CG and manual input from 2nd person to push walker into ground rather than to pull up as he leans towards right. Patient then educated with frederick on scooting and required assist for LE placement when scooting towards HOB. Assisted patient to supine with additional time and performed TE as listed below. Educated on role and goals of therapy and patient denied further need for assist at this time. Patient educated not to get up without assist and oriented to call bell; he acknowledged understanding. Progression toward goals:   ?      Improving appropriately and progressing toward goals  ? Improving slowly and progressing toward goals  ? Not making progress toward goals and plan of care will be adjusted     PLAN:  Patient continues to benefit from skilled intervention to address the above impairments. Continue treatment per established plan of care. Discharge Recommendations:  Inpatient Rehab  Further Equipment Recommendations for Discharge:  N/A     SUBJECTIVE:   Patient stated I was doing some of the arm exercises yesterday.     OBJECTIVE DATA SUMMARY:   Critical Behavior:  Neurologic State: Alert  Orientation Level: Oriented X4  Cognition: Follows commands  Safety/Judgement: Fall prevention  Functional Mobility Training:  Bed Mobility:   Supine to Sit: Maximum assistance  Sit to Supine: Moderate assistance; Additional timeCG of 2nd person  Scooting: Moderate assistance   Transfers:  Sit to Stand: Maximum assistance(CG of 2nd person)  Stand to Sit: Maximum assistance(CG of 2nd person)      Balance:  Sitting: Impaired; With support  Sitting - Static: Fair (occasional)  Sitting - Dynamic: Fair (occasional)  Standing: Impaired; With support  Standing - Static: Poor  Standing - Dynamic : Poor  Seated EOB apprx 10mins total; performed weight shifting x5 to each UE with CG towards left and ModA towards right with therapist manual assist at shoulder to ensure appropriate alignment of humeral head in shoulder joint  Patient required cues to attain full midline when transferring from right forearm weight bearing as he tends to stop apprx 10-20 degrees shy of midline  Education:  ?         Bed mobility  ? Transfers  ? Ambulation  ? Assistive device management  ? Stairs  ? Body mechanics  ? Position change  ? Activity pacing/energy conservation  ? Other:   Therapeutic Exercises:   Bilaterally    EXERCISE   Sets   Reps   Active Active Assist   Passive Self ROM   Comments   Ankle Pumps    ? ? ? ?    Quad Sets 1 5  ? ? ? ? Hold for 3 secs; SUPINE   Glute Sets 1 5 ? ? ? ? Hold for 3 secs; SUPINE   Short Arc Quads   ? ? ? ? Heel Slides   ? ? ? ? Straight Leg Raises   ? ? ? ? Hip Add   ? ? ? ? Hold for 5 secs, w/ pillow squeeze   Long Arc Quads 1 15 ? L ? R ? ? SEATED   Seated Marching   ? ? ? ? Standing Marching   ? ? ? ?       ? ? ? ? Pain:  Pain level pre-treatment: 0/10  Pain level post-treatment: 0/10     Activity Tolerance:   Patient tolerated activity well and was motivated to participate in therapy. Please refer to the flowsheet for vital signs taken during this treatment. After treatment:   ? Patient left in no apparent distress sitting up in chair  ? Patient left in no apparent distress in bed  ? Call bell left within reach  ? Nursing notified  ? Caregiver present  ? Bed alarm activated  ? SCDs applied      COMMUNICATION/EDUCATION:   ?         Role of Physical Therapy in the acute care setting. ?         Fall prevention education was provided and the patient/caregiver indicated understanding. ? Patient/family have participated as able in working toward goals and plan of care. ?         Patient/family agree to work toward stated goals and plan of care. ?         Patient understands intent and goals of therapy, but is neutral about his/her participation. ? Patient is unable to participate in stated goals/plan of care: ongoing with therapy staff.   ?         Other:        Aurelia Nguyen, PT   Time Calculation: 38 mins

## 2019-08-14 NOTE — PROGRESS NOTES
Patient accepted to ARU when medically ready. Dr. Rahat Goldstein aware.       Mario Rubio, MSW  Case Management  [phone]

## 2019-08-14 NOTE — PROGRESS NOTES
Chart reviewed. PT/OT recommending inpatient rehab. Notified Sally of ARU.       TEE BeattyN RN  Care Management  Pager: [phone]

## 2019-08-14 NOTE — PROGRESS NOTES
Attempted to see patient for physical therapy treatment. Unable to see patient at this time as he is leaving unit for MBS. Will follow up as patient schedule allows. Thank you for this referral. Brenda Covarrubias, PT, DPT.

## 2019-08-14 NOTE — ROUTINE PROCESS
TRANSFER - OUT REPORT: 
 
Verbal report given on Krystal Kumar  being transferred to [facility](unit) for routine progression of care Report consisted of patients Situation, Background, Assessment and  
Recommendations(SBAR). Information from the following report(s) SBAR, Kardex, Procedure Summary, Intake/Output, MAR and Recent Results was reviewed with the receiving nurse. Lines:  
Peripheral IV 08/12/19 Left Antecubital (Active) Site Assessment Clean, dry, & intact 8/13/2019  8:00 PM  
Phlebitis Assessment 0 8/13/2019  8:00 PM  
Infiltration Assessment 0 8/13/2019  8:00 PM  
Dressing Status Clean, dry, & intact 8/13/2019  8:00 PM  
Dressing Type Transparent;Tape 8/13/2019  8:00 PM  
Hub Color/Line Status Green;Flushed;Patent;Capped 8/13/2019  8:00 PM  
Action Taken Open ports on tubing capped 8/13/2019  8:00 PM  
Alcohol Cap Used Yes 8/13/2019  8:00 PM  
   
Peripheral IV 08/12/19 Right Antecubital (Active) Site Assessment Clean, dry, & intact 8/13/2019  8:00 PM  
Phlebitis Assessment 0 8/13/2019  8:00 PM  
Infiltration Assessment 0 8/13/2019  8:00 PM  
Dressing Status Clean, dry, & intact 8/13/2019  8:00 PM  
Dressing Type Transparent;Tape 8/13/2019  8:00 PM  
Hub Color/Line Status Green;Flushed;Patent;Capped 8/13/2019  8:00 PM  
Action Taken Open ports on tubing capped 8/13/2019  8:00 PM  
Alcohol Cap Used Yes 8/13/2019  8:00 PM  
  
 
Opportunity for questions and clarification was provided. Patient transported with: 
 Monitor

## 2019-08-14 NOTE — TELEPHONE ENCOUNTER
Pt's wife called to inform Dr. Zigmund Favre that pt was scheduled for a sleep study on 8/11, however, he was unable to complete it. Pt couldn't sleep. He was to call the following day to reschedule, but he had a stroke on 8/12. He has been at [facility] since. Pt's wife states they will not be able to schedule any appts until after he recovers.

## 2019-08-14 NOTE — PROGRESS NOTES
Re:  Yennifer Rios,Follow up visit     8/14/2019 1:57 PM    SSN: [id number]    Subjective:   Zee Barragan is seen in follow up, his daughter is present. He has no complaints.       Medications:    Current Facility-Administered Medications   Medication Dose Route Frequency Provider Last Rate Last Dose    potassium chloride 10 mEq, lidocaine (PF) (XYLOCAINE) 10 mg/mL (1 %) 1 mL in 0.9% sodium chloride 100 mL IVPB   IntraVENous Q1H Tanmay DING DO        amLODIPine (NORVASC) tablet 10 mg  10 mg Oral DAILY Bishop Karen PA-C   10 mg at 08/14/19 0834    metoprolol succinate (TOPROL-XL) XL tablet 50 mg  50 mg Oral DAILY Bishop Karen PA-C   50 mg at 08/14/19 0834    gemfibrozil (LOPID) tablet 600 mg  600 mg Oral ACB&D Too DING MD   600 mg at 08/14/19 0834    sodium chloride (NS) flush 5-40 mL  5-40 mL IntraVENous Q8H Richa Novoa MD   10 mL at 08/14/19 0649    sodium chloride (NS) flush 5-40 mL  5-40 mL IntraVENous PRN Richa Novoa MD        aspirin tablet 325 mg  325 mg Oral DAILY Richa Novoa MD   325 mg at 08/14/19 0834    heparin (porcine) injection 5,000 Units  5,000 Units SubCUTAneous Q8H Richa Novoa MD   5,000 Units at 08/14/19 0647    sodium chloride (NS) flush 5-40 mL  5-40 mL IntraVENous Q8H Griselda Ricardo NP   10 mL at 08/14/19 4986    sodium chloride (NS) flush 5-40 mL  5-40 mL IntraVENous PRN Griselda Ricardo NP           Vital signs:    Visit Vitals  BP (!) 195/98 (BP 1 Location: Left arm, BP Patient Position: Sitting)   Pulse 80   Temp 98.3 °F (36.8 °C)   Resp 22   Ht 5' 9\" (1.753 m)   Wt 100.7 kg (222 lb)   SpO2 97%   BMI 32.78 kg/m²       Review of Systems:   As above otherwise 11 point review of systems negative including;   Constitutional no fever or chills  Skin denies rash or itching  HEENT  Denies tinnitus, hearing lose  Eyes denies diplopia vision lose  Respiratory denies sortness of breath  Cardiovascular denies chest pain, dyspnea on exertion  Gastrointestinal denies nausea, vomiting, diarrhea, constipation  Genitourinary denies incontinence  Musculoskeletal denies joint pain or swelling  Endocrine denies weight change  Hematology denies easy bruising or bleeding   Neurological as above in HPI      Patient Active Problem List   Diagnosis Code    Severe obesity ([facility] E66.01    [facility]         Objective: The patient is awake, alert, and oriented x 4. Fund of knowledge is adequate. Speech is slurred but fluent and memory is intact. Cranial Nerves: II  Visual fields are full to confrontation. III, IV, VI  Extraocular movements are intact. There is no nystagmus. V  Facial sensation is intact to pinprick. VII  Face is asymmetrical, dense right facial.  VIII - Hearing is present. IX, X, XII  Palate is symmetrical.   XI - Shoulder shrugging and head turning intact  Motor: The patient has a dense right arm plegia, about 2/5 in the leg. Left side is 5/5. Tone is normal. Reflexes are 2+ and symmetrical. Plantars are up going on the right. Gait is not testable.     Lab Results   Component Value Date/Time    Glucose 126 (H) 08/14/2019 03:42 AM    Sodium 145 08/14/2019 03:42 AM    Potassium 2.7 (LL) 08/14/2019 03:42 AM    Chloride 110 08/14/2019 03:42 AM    CO2 26 08/14/2019 03:42 AM    BUN 28 (H) 08/14/2019 03:42 AM    Creatinine 1.39 (H) 08/14/2019 03:42 AM    Calcium 8.3 (L) 08/14/2019 03:42 AM     CMP:   Lab Results   Component Value Date/Time    Glucose 126 (H) 08/14/2019 03:42 AM    Sodium 145 08/14/2019 03:42 AM    Potassium 2.7 (LL) 08/14/2019 03:42 AM    Chloride 110 08/14/2019 03:42 AM    CO2 26 08/14/2019 03:42 AM    BUN 28 (H) 08/14/2019 03:42 AM    Creatinine 1.39 (H) 08/14/2019 03:42 AM    Calcium 8.3 (L) 08/14/2019 03:42 AM    Anion gap 9 08/14/2019 03:42 AM    BUN/Creatinine ratio 20 08/14/2019 03:42 AM     Coagulation: No results found for: PTP, INR, APTT, PTTT  Cardiac markers:   Lab Results   Component Value Date/Time     (H) 08/13/2019 12:05 PM    CK-MB Index 0.4 08/13/2019 12:05 PM     8/13/2019  4:37 AM - Cedric, Lab In [facility]     Component Value Flag Ref Range Units Status   Hemoglobin A1c 6.6  High   4.2 - 5.6 % Final   Comment:     8/13/2019  5:10 AM - Cedric, Lab In [facility]     Component Value Flag Ref Range Units Status   LIPID PROFILE          Final   Cholesterol, total 185   <200 MG/DL Final   Triglyceride 164  High   <150 MG/DL Final   Comment:   The drugs N-acetylcysteine (NAC) and   Metamiszole have been found to cause falsely   low results in this chemical assay. Please   be sure to submit blood samples obtained   BEFORE administration of either of these   drugs to assure correct results. HDL Cholesterol 42   40 - 60 MG/DL Final   LDL, calculated 110.2  High   0 - 100 MG/DL Final   VLDL, calculated 32.8    MG/DL Final   CHOL/HDL Ratio 4.4   0 - 5.0   Final         Assessment:  New left lacune CVA who has risk factors including hypertension, diabetes. Please note patient refuses statin agent because of potential side effects. Plan:  Needs rehab. OK to rehab from my standpoint. Sincerely,        Aleja Potter.  Hank Flowers M.D.

## 2019-08-14 NOTE — PROGRESS NOTES
0730  Bedside SBAR report taken from [facility]. Patient lying in the bed no visitors present. Patient follows all commands. See NIH. Pupils are equal and reactive. Uses urinal and bedpan. 2000  Had a large loose stool. Cleaned patient up from incontinent stool. 0000  No change in assessment    0400  Only change in NIH is that he can now kept his right leg in the air for 5 seconds, however, it does have a drift but does not drop to the bed    0730  Bedside SBAR report given to incoming RN Devang Jerrica.

## 2019-08-14 NOTE — PROGRESS NOTES
Problem: Dysphagia (Adult)  Goal: *Acute Goals and Plan of Care (Insert Text)  Description  Patient will:  1. Tolerate PO trials with 0 s/s overt distress in 4/5 trials  2. Utilize compensatory swallow strategies/maneuvers (decrease bite/sip, size/rate, alt. liq/sol) with min cues in 4/5 trials  3. Perform oral-motor/laryngeal exercises to increase oropharyngeal swallow function with min cues  4. Complete an objective swallow study (i.e., MBSS) to assess swallow integrity, r/o aspiration, and determine of safest LRD, min A as indicated/ordered by MD     Recommend:   Regular diet with nectar thick liquids  NO STRAWS  Meds with nectar thick liquids or in puree   Aspiration precautions  HOB >45 degrees during all intake and for at least 30 min after po   Small bites/sips, slow rate of intake, alternating bites/sips  Oral care three times daily  MBS       Outcome: Progressing Towards Goal    SPEECH LANGUAGE PATHOLOGY DYSPHAGIA TREATMENT    Patient: Elizabeth Haley (51 y.o. male)  Date: 8/14/2019  Diagnosis: CVA (cerebral vascular accident) [facility]) [I63.9]   Precautions: Aspiration, Fall  PLOF: Regular diet with thin liquids      ASSESSMENT:  Pt seen for follow up dysphagia tx with wife present at bedside. Pt tolerating ice chip presentations with mild facial redness; otherwise, no overt s/sx aspiration. Pt continuing to tolerate nectar thick liquids - straw and regular solid trials with no overt s/sx aspiration. Pt able to complete effortful swallow x5 reps given min cues. Recommend continue current diet with MBS to further assess oropharyngeal swallow integrity and rule out silent aspiration. Discussed with pt, pt's wife and RN. Will continue to follow for further dysphagia management. Progression toward goals:  ?         Improving appropriately and progressing toward goals  ? Improving slowly and progressing toward goals  ?          Not making progress toward goals and plan of care will be adjusted PLAN:  Recommendations and Planned Interventions:  As above   Patient continues to benefit from skilled intervention to address the above impairments. Continue treatment per established plan of care. Discharge Recommendations:  Inpatient Rehab     SUBJECTIVE:   Patient stated When am I going to be able to have regular water? \"    OBJECTIVE:   Cognitive and Communication Status:  Neurologic State: Alert  Orientation Level: Oriented X4  Cognition: Follows commands  Perception: Appears intact  Perseveration: No perseveration noted  Safety/Judgement: Fall prevention  Dysphagia Treatment:  Oral Assessment:  Oral Assessment  Labial: Right droop, Impaired coordination, Decreased seal  Dentition: Natural  Oral Hygiene: Good  Lingual: Incoordinated  Velum: Unable to visualize  Mandible: No impairment  P.O. Trials:   Patient Position: 45 at [facility]   Vocal quality prior to P.O.: Low volume, Breathy   Consistency Presented: Ice chips   How Presented: Self-fed/presented, SLP-fed/presented, Spoon, Straw, Successive swallows   Bolus Acceptance: No impairment   Bolus Formation/Control: Impaired   Type of Impairment: Mastication, Delayed   Propulsion: Delayed (# of seconds)   Oral Residue: Lingual, Less than 10% of bolus   Initiation of Swallow: Delayed (# of seconds)   Laryngeal Elevation: Decreased   Aspiration Signs/Symptoms: Facial redness   Pharyngeal Phase Characteristics: Effortful swallow   Effective Modifications: Small sips and bites, Cup/sip   Cues for Modifications: Minimal   Oral Phase Severity: Mild   Pharyngeal Phase Severity : Moderate     PAIN:  Start of Tx: 0  End of Tx: 0     After treatment:   ?              Patient left in no apparent distress sitting up in chair  ? Patient left in no apparent distress in bed  ? Call bell left within reach  ? Nursing notified  ? Family present  ? Caregiver present  ?               Bed alarm activated      COMMUNICATION/EDUCATION:   ? Aspiration precautions; swallow safety; compensatory techniques  ? Patient/family able to participate in training and education   ? Patient unable to participate in training and education, education ongoing with staff   ?  Patient understands goals and intent of therapy; neutral about participation     Sudha Rushing M.S., [address] [facility]  Speech-Language Pathologist

## 2019-08-14 NOTE — ROUTINE PROCESS
Bedside and Verbal shift change report given to India Diana RN (oncoming nurse) by Katya Argueta RN/MAURO De (offgoing nurse). Report included the following information SBAR, Kardex, ED Summary, Intake/Output, MAR, Recent Results, Med Rec Status, Cardiac Rhythm (NSR) and Alarm Parameters .

## 2019-08-14 NOTE — PROGRESS NOTES
SLP Note:    MBS completed; full report to follow. Recommend [facility] soft diet with honey thick liquids. Meds with pudding or honey thick liquids.       Abram Schultz M.S., [address] [facility]  Speech-Language Pathologist

## 2019-08-14 NOTE — PROGRESS NOTES
ARU/IPR REFERRAL CONTACT NOTE  [id number] [place] [address] for Physical Rehabilitation    RE: Rossy Soto     Thank you for the opportunity to review this patient's case for admission to [address] [place] [address] for Physical Rehabilitation. Based on our pre-admission screening:     [x ] This patient meets criteria for admission to [facility] for Physical      Rehabilitation once he is medically stable. Will continue to follow. Again, Thank you for this referral. Should you have any questions please do not hesitate to call. Sincerely,  Madonna Souza. Rama Monroy, [address]   Rama Monroy, RN  Admissions [facility] for Physical Rehabilitation  [phone]

## 2019-08-14 NOTE — PROGRESS NOTES
SPEECH LANGUAGE PATHOLOGY   SPEECH-LANGUAGE TREATMENT    Patient: Kitty Ricci (2 y.o. male)  Date: 8/14/2019  Diagnosis: CVA (cerebral vascular accident) [facility]) [I63.9]   Precautions: Aspiration  PLOF: Speech/voice within functional limits     ASSESSMENT:  Pt demonstrating improved ability to utilize compensatory speech techniques for improved intelligibility. Pt demo decreased rate and exaggerated articulation x70% accuracy given min cues. Intelligibility ~80% in conversation. Pt able to complete speech oral motor exercises with x80% accuracy given min cues for compensatory strategy use. Recommend continued SLP to address deficits upon discharge. Discussed with pt, pt's wife and RN. Progression toward goals:  ?       Improving appropriately and progressing toward goals  ? Improving slowly and progressing toward goals  ? Not making progress toward goals and plan of care will be adjusted     PLAN:  Patient continues to benefit from skilled intervention to address the above impairments. Continue treatment per established plan of care. Discharge Recommendations:  Inpatient Rehab     SUBJECTIVE:   Patient stated It's getting a little better. OBJECTIVE:   Mental Status:  Neurologic State: Alert  Orientation Level: Oriented X4  Cognition: Follows commands  Perception: Appears intact  Perseveration: No perseveration noted  Safety/Judgement: Fall prevention  Treatment & Interventions:  See above  Response & Tolerance to Activities:  Excellent     PAIN:  Start of Tx: 0  End of Tx: 0     After treatment:   ?       Patient left in no apparent distress sitting up in chair  ? Patient left in no apparent distress in bed  ? Call bell left within reach  ? Nursing notified  ? Caregiver present  ? Bed alarm activated      COMMUNICATION/EDUCATION:   ? Compensatory speech/language/comprehension techniques  ?         Patient/family able to participate in training and education   ?   Patient unable to participate in education; education ongoing with staff    Danita Linder M.S., [address] [facility]  Speech-Language Pathologist

## 2019-08-14 NOTE — PROGRESS NOTES
[facility] Hospitalist Group  Progress Note    Patient: Rola Franco Age: 71 y.o. : 1955 MR#: [id number] SSN: [id number]  Date/Time: 2019    Subjective:     Denies pain, continued weakness of right side; right leg \"feels like in a hole\". No N/V, CP, SOB. Speech improved some; No HA, vision change, bruising. Assessment/Plan:   1. Acute CVA - s/p tPA without complication. Appears stable with slight improvement in symptoms. Continue ASA, gemfibrozil, judicious BP control, PT/OT efforts. Agreeable to statin start  2. HTN - holding Hyzaar due to MARISSA. On home Norvasc and Toprol-XL dose; add hydralazine - goal -180. 3. HypoK+ - repleting. Mg2+ wnl. Remains low, recheck later today  4. MARISSA - improved. Holding Hyzaar for now. Historically had nml renal fxn based on lab review in Care Everywhere system. 5.  Dispo planning - Anticipate likely to ARU on 08/15 provided    Additional Notes:     CT head repeat:  IMPRESSION:  1.  2.5 x 1.6 cm infarct in the left basal ganglia. 2. No acute intracranial hemorrhage. 3. No significant mass effect or midline shift. Case discussed with:  [x]Patient  [x]Family  []Nursing  []Case Management  DVT Prophylaxis:  []Lovenox  [x]Hep SQ  []SCDs  []Coumadin   []On Heparin gtt    Objective:   VS:   Visit Vitals  BP (!) 195/98 (BP 1 Location: Left arm, BP Patient Position: Sitting)   Pulse 80   Temp 98.3 °F (36.8 °C)   Resp 22   Ht 5' 9\" (1.753 m)   Wt 100.7 kg (222 lb)   SpO2 97%   BMI 32.78 kg/m²      Tmax/24hrs: Temp (24hrs), Av.2 °F (36.8 °C), Min:97.5 °F (36.4 °C), Max:98.6 °F (37 °C)    Input/Output:     Intake/Output Summary (Last 24 hours) at 2019 1142  Last data filed at 2019 1124  Gross per 24 hour   Intake    Output 1300 ml   Net -1300 ml     General:  Awake, alert, NAD. Cardiovascular:  RRR. Pulmonary:  CTA B.  GI:  Soft, NT/ND, NABS. Extremities:  No CT or edema.  0/5 motor RUE, 2/5 motor RLE. 5/5 for LUE and LLE. Additional:  Dysarthric speech. Labs:    Recent Results (from the past 24 hour(s))   CBC WITH AUTOMATED DIFF    Collection Time: 08/13/19 12:05 PM   Result Value Ref Range    WBC 10.6 4.6 - 13.2 K/uL    RBC 3.78 (L) 4.70 - 5.50 M/uL    HGB 10.4 (L) 13.0 - 16.0 g/dL    HCT 32.0 (L) 36.0 - 48.0 %    MCV 84.7 74.0 - 97.0 FL    MCH 27.5 24.0 - 34.0 PG    MCHC 32.5 31.0 - 37.0 g/dL    RDW 14.7 (H) 11.6 - 14.5 %    PLATELET 620 973 - 118 K/uL    MPV 11.3 9.2 - 11.8 FL    NEUTROPHILS 79 (H) 42 - 75 %    LYMPHOCYTES 18 (L) 20 - 51 %    MONOCYTES 3 2 - 9 %    EOSINOPHILS 0 0 - 5 %    BASOPHILS 0 0 - 3 %    ABS. NEUTROPHILS 8.4 (H) 1.8 - 8.0 K/UL    ABS. LYMPHOCYTES 1.9 0.8 - 3.5 K/UL    ABS. MONOCYTES 0.3 0 - 1.0 K/UL    ABS. EOSINOPHILS 0.0 0.0 - 0.4 K/UL    ABS.  BASOPHILS 0.0 0.0 - 0.06 K/UL    DF MANUAL      PLATELET COMMENTS ADEQUATE PLATELETS      RBC COMMENTS ANISOCYTOSIS  1+       METABOLIC PANEL, BASIC    Collection Time: 08/13/19 12:05 PM   Result Value Ref Range    Sodium 144 136 - 145 mmol/L    Potassium 2.8 (LL) 3.5 - 5.5 mmol/L    Chloride 109 100 - 111 mmol/L    CO2 28 21 - 32 mmol/L    Anion gap 7 3.0 - 18 mmol/L    Glucose 145 (H) 74 - 99 mg/dL    BUN 26 (H) 7.0 - 18 MG/DL    Creatinine 1.32 (H) 0.6 - 1.3 MG/DL    BUN/Creatinine ratio 20 12 - 20      GFR est AA >60 >60 ml/min/1.73m2    GFR est non-AA 54 (L) >60 ml/min/1.73m2    Calcium 8.6 8.5 - 10.1 MG/DL   MAGNESIUM    Collection Time: 08/13/19 12:05 PM   Result Value Ref Range    Magnesium 2.2 1.6 - 2.6 mg/dL   PHOSPHORUS    Collection Time: 08/13/19 12:05 PM   Result Value Ref Range    Phosphorus 2.5 2.5 - 4.9 MG/DL   CARDIAC PANEL,(CK, CKMB & TROPONIN)    Collection Time: 08/13/19 12:05 PM   Result Value Ref Range     (H) 39 - 308 U/L    CK - MB 1.5 <3.6 ng/ml    CK-MB Index 0.4 0.0 - 4.0 %    Troponin-I, QT 0.04 0.0 - 0.045 NG/ML   TSH 3RD GENERATION    Collection Time: 08/13/19 12:05 PM   Result Value Ref Range    TSH 1.72 0.36 - 3.74 uIU/mL   ECHO ADULT COMPLETE    Collection Time: 08/13/19  2:36 PM   Result Value Ref Range    LA Volume 70.21 18 - 58 mL    Tapse 2.32 (A) 1.5 - 2.0 cm    Ao Root D 3.36 cm    LVIDd 4.93 4.2 - 5.9 cm    LVPWd 1.64 (A) 0.6 - 1.0 cm    LVIDs 3.27 cm    IVSd 1.83 (A) 0.6 - 1.0 cm    LVOT d 2.34 cm    LVOT Peak Velocity 128.94 cm/s    LVOT Peak Gradient 6.6 mmHg    LVOT VTI 26.50 cm    MV A Federico 78.16 cm/s    MV E Federico 70.03 cm/s    MV E/A 0.90     LA Vol 4C 51.33 18 - 58 mL    LA Vol 2C 82.57 (A) 18 - 58 mL    LA Area 4C 20.6 cm2    LV Mass .9 (A) 88 - 224 g    LV Mass AL Index 221.3 (A) 49 - 115 g/m2    Mitral Valve E Wave Deceleration Time 173.3 ms    LA Vol Index 32.51 16 - 28 ml/m2    LA Vol Index 38.24 16 - 28 ml/m2    LA Vol Index 23.77 16 - 28 ml/m2   CBC WITH AUTOMATED DIFF    Collection Time: 08/14/19  3:42 AM   Result Value Ref Range    WBC 16.4 (H) 4.6 - 13.2 K/uL    RBC 3.62 (L) 4.70 - 5.50 M/uL    HGB 9.8 (L) 13.0 - 16.0 g/dL    HCT 30.7 (L) 36.0 - 48.0 %    MCV 84.8 74.0 - 97.0 FL    MCH 27.1 24.0 - 34.0 PG    MCHC 31.9 31.0 - 37.0 g/dL    RDW 14.6 (H) 11.6 - 14.5 %    PLATELET 761 010 - 834 K/uL    MPV 11.5 9.2 - 11.8 FL    NEUTROPHILS 83 (H) 40 - 73 %    LYMPHOCYTES 11 (L) 21 - 52 %    MONOCYTES 4 3 - 10 %    EOSINOPHILS 2 0 - 5 %    BASOPHILS 0 0 - 2 %    ABS. NEUTROPHILS 13.7 (H) 1.8 - 8.0 K/UL    ABS. LYMPHOCYTES 1.8 0.9 - 3.6 K/UL    ABS. MONOCYTES 0.6 0.05 - 1.2 K/UL    ABS. EOSINOPHILS 0.3 0.0 - 0.4 K/UL    ABS.  BASOPHILS 0.0 0.0 - 0.1 K/UL    DF AUTOMATED     METABOLIC PANEL, BASIC    Collection Time: 08/14/19  3:42 AM   Result Value Ref Range    Sodium 145 136 - 145 mmol/L    Potassium 2.7 (LL) 3.5 - 5.5 mmol/L    Chloride 110 100 - 111 mmol/L    CO2 26 21 - 32 mmol/L    Anion gap 9 3.0 - 18 mmol/L    Glucose 126 (H) 74 - 99 mg/dL    BUN 28 (H) 7.0 - 18 MG/DL    Creatinine 1.39 (H) 0.6 - 1.3 MG/DL    BUN/Creatinine ratio 20 12 - 20      GFR est AA >60 >60 ml/min/1.73m2    GFR est non-AA 51 (L) >60 ml/min/1.73m2    Calcium 8.3 (L) 8.5 - 10.1 MG/DL   MAGNESIUM    Collection Time: 08/14/19  3:42 AM   Result Value Ref Range    Magnesium 2.2 1.6 - 2.6 mg/dL   PHOSPHORUS    Collection Time: 08/14/19  3:42 AM   Result Value Ref Range    Phosphorus 4.4 2.5 - 4.9 MG/DL   LIPID PANEL    Collection Time: 08/14/19  3:42 AM   Result Value Ref Range    LIPID PROFILE          Cholesterol, total 173 <200 MG/DL    Triglyceride 200 (H) <150 MG/DL    HDL Cholesterol 39 (L) 40 - 60 MG/DL    LDL, calculated 94 0 - 100 MG/DL    VLDL, calculated 40 MG/DL    CHOL/HDL Ratio 4.4 0 - 5.0       Additional Data Reviewed:      Signed By: Anand Bowie NP     August 14, 2019

## 2019-08-14 NOTE — PROGRESS NOTES
Occupational Therapy Note    Patient: Maria Guadalupe Murphy (89 y.o. male)  Date: 8/14/2019  Diagnosis: CVA (cerebral vascular accident) [facility]) [I63.9] <principal problem not specified>      Precautions: Fall  Chart, occupational therapy assessment, plan of care, and goals were reviewed. Occupational Therapy treatment attempted. Patient is unable to participate due to:  []  Nausea/vomiting  []  Eating  []  Pain  []  Pt lethargic  []  Off Unit  [x] Other:  Attempt x1: pt is speaking with care management from ARU (09:59)  Attempt x2: pt is getting ready to leave for MBS (13:04)  Will f/u later as schedule allows. Thank you.     JUSTIN Rios/ROVERTO

## 2019-08-14 NOTE — PROGRESS NOTES
Problem: Dysphagia (Adult)  Goal: *Acute Goals and Plan of Care (Insert Text)  Description  Patient will:  1. Tolerate PO trials with 0 s/s overt distress in 4/5 trials  2. Utilize compensatory swallow strategies/maneuvers (decrease bite/sip, size/rate, alt. liq/sol) with min cues in 4/5 trials  3. Perform oral-motor/laryngeal exercises to increase oropharyngeal swallow function with min cues  4. Complete an objective swallow study (i.e., MBSS) to assess swallow integrity, r/o aspiration, and determine of safest LRD, min A as indicated/ordered by MD     Recommend:   Mech soft, honey thick liquids   Meds with honey thick liquids or in puree   Aspiration precautions  HOB >45 degrees during all intake and for at least 30 min after po   Small bites/sips, slow rate of intake, alternating bites/sips  Oral care three three times daily        8/14/2019 1446 by Edilson Maldonado, SLP  Outcome: Progressing Towards Goal  8/14/2019 1049 by Edilson Maldonado, SLP  Outcome: Progressing Towards Goal     SPEECH PATHOLOGY MODIFIED BARIUM SWALLOW STUDY & TREATMENT    Patient: Esperanza Francisco (89 y.o. male)  Date: 8/14/2019  Primary Diagnosis: CVA (cerebral vascular accident) [facility]) [I63.9]  Precautions: Aspiration,  Fall    ASSESSMENT :  Based on the objective data described below, the patient presents with mild-mod oral and moderate pharyngeal dysphagia. Pt demo silent aspiration during the swallow with thin liquids and silent laryngeal penetration during the swallow with nectar thick liquids. Small sips, effortful swallow and chin tuck were ineffective at improving airway protection with thin liquids. Pt able to tolerate nectar thick liquids with mod cues for chin tuck intermittently. Pt tolerating honey thick liquids, pudding, regular solids and 13 mm Ba pill with honey thick liquid wash with positive airway protection noted across multiple trials.   Deficits include decreased bolus formation/control with premature spillage, decreased laryngeal elevation/adduction/sensation and slowed epiglottic inversion. Recommend [facility] soft diet with honey thick liquids with strict use of the above mentioned compensatory strategies/aspiration precautions. Further recommend trials of nectar thick liquids with chin tuck with speech therapy only. TREATMENT :  Treatment provided post diagnostic testing with pt and wife including oropharyngeal anatomy/physiology, MBS results, diet recommendations and compensatory strategies/positioning. Video feedback utilized. Pt and wife able to verbalize understanding. Will continue to follow. Patient will benefit from skilled intervention to address the above impairments. Patient's rehabilitation potential is considered to be Excellent  Factors which may influence rehabilitation potential include:   ? None noted  ? Mental ability/status  ? Medical condition  ? Home/family situation and support systems  ? Safety awareness  ? Pain tolerance/management  ? Other:      PLAN :  Recommendations and Planned Interventions:  As above   Frequency/Duration: Patient will be followed by speech-language pathology 1-2 times per day/4-7 days per week to address goals. Discharge Recommendations: Inpatient Rehab     SUBJECTIVE:   Patient stated Can I have some water?     OBJECTIVE:     Past Medical History:   Diagnosis Date    Essential hypertension     Sleep apnea     on cpap     Past Surgical History:   Procedure Laterality Date    COLONOSCOPY N/A 4/15/2019    COLONOSCOPY performed by Amina Sinclair MD at [facility] ENDOSCOPY    HX TONSILLECTOMY       Prior Level of Function/Home Situation:  Home Situation  Home Environment: Private residence  # Steps to Enter: 2  Rails to Enter: No  One/Two Story Residence: One story  Living Alone: No  Support Systems: Spouse/Significant Other/Partner  Patient Expects to be Discharged to[employer] residence  Current DME Used/Available at Home: None  Tub or Shower Type: Tub/Shower combination  Diet prior to admission: Regular/thin  Current Diet:  Mech soft/NTL with no straws; recommend change to mech soft/HTL    Radiologist:    Film Views: Fluoro;Lateral  Patient Position: 90 in chair    Trial 1: Trial 2:   Consistency Presented: Thin liquid Consistency Presented: Nectar thick liquid   How Presented: Self-fed/presented;Cup/sip;Spoon How Presented: Self-fed/presented;Cup/sip;Spoon         Bolus Acceptance: No impairment     Bolus Formation/Control: Impaired: Premature spillage Bolus Formation/Control: Impaired: Premature spillage   Propulsion: Discoordination Propulsion: Discoordination   Oral Residue: None Oral Residue: None   Initiation of Swallow: No impairment     Timing: No impairment Timing: No impairment   Penetration: None Penetration: During swallow; To laryngeal vestibule(Resolved intermittently with chin tuck)   Aspiration/Timing: Silent ;During Aspiration/Timing: No evidence of aspiration   Pharyngeal Clearance: No residue Pharyngeal Clearance: No residue   Attempted Modifications: Spoon;Small sips and bites; Chin tuck;Effortful swallow Attempted Modifications: Chin tuck; Spoon;Small sips and bites   Effective Modifications: None Effective Modifications: (Chin tuck intermittently )   Cues for Modifications: Moderate  Cues for Modifications: Moderate     Trial 3:   Consistency Presented: Honey thick liquid;Pudding; Solid(13 mm Ba pill with honey thick liquid wash )   How Presented: Self-fed/presented;Cup/sip;Spoon       Bolus Acceptance: No impairment   Bolus Formation/Control: Impaired: Premature spillage;Mastication   Propulsion: Discoordination   Oral Residue: Lingual       Timing: No impairment   Penetration: None   Aspiration/Timing: No evidence of aspiration   Pharyngeal Clearance: No residue   Attempted Modifications: Small sips and bites; Alternate liquids/solids   Effective Modifications: Alternate liquids/solids   Cues for Modifications: Moderate      Decreased Tongue Base Retraction?: Yes  Laryngeal Elevation: Incomplete laryngeal closure; Inadequate epiglottic inversion  Aspiration/Penetration Score: 8 (Aspiration-Contrast passes cords/glottis with no effort to eject, ie/silent aspiration)  Pharyngeal Symmetry: Not assessed  Pharyngeal-Esophageal Segment: No impairment  Pharyngeal Dysfunction: Decreased tongue base retraction;Decreased strength;Decreased elevation/closure  Oral Phase Severity: Mild-moderate  Pharyngeal Phase Severity: Moderate    8-point Penetration-Aspiration Scale: Score 8    PAIN:  Pt reports 0/10 pain or discomfort prior to MBS. Pt reports 0/10 pain or discomfort post MBS. COMMUNICATION/EDUCATION:   ?  Patient educated regarding MBS results and diet recommendations. ?  Patient/family have participated as able in goal setting and plan of care. ?  Patient/family agree to work toward stated goals and plan of care. ?  Patient understands intent and goals of therapy, but is neutral about his/her participation. ? Patient is unable to participate in goal setting and plan of care.     Thank you for this referral,  César Matta M.S., [id number] [facility]  Speech-Language Pathologist

## 2019-08-15 ENCOUNTER — HOSPITAL ENCOUNTER (INPATIENT)
Age: 64
LOS: 21 days | Discharge: NURSING, REHAB OR LONG-TERM CARE | DRG: 057 | End: 2019-09-05
Attending: INTERNAL MEDICINE | Admitting: INTERNAL MEDICINE
Payer: MEDICARE

## 2019-08-15 VITALS
SYSTOLIC BLOOD PRESSURE: 180 MMHG | HEIGHT: 69 IN | HEART RATE: 83 BPM | OXYGEN SATURATION: 97 % | TEMPERATURE: 98.7 F | DIASTOLIC BLOOD PRESSURE: 86 MMHG | WEIGHT: 224.9 LBS | BODY MASS INDEX: 33.31 KG/M2 | RESPIRATION RATE: 18 BRPM

## 2019-08-15 DIAGNOSIS — N18.2 TYPE 2 DIABETES MELLITUS WITH STAGE 2 CHRONIC KIDNEY DISEASE, WITHOUT LONG-TERM CURRENT USE OF INSULIN (HCC): Chronic | ICD-10-CM

## 2019-08-15 DIAGNOSIS — E55.9 VITAMIN D DEFICIENCY: Chronic | ICD-10-CM

## 2019-08-15 DIAGNOSIS — I63.9 ACUTE ISCHEMIC STROKE (HCC): Primary | ICD-10-CM

## 2019-08-15 DIAGNOSIS — G47.33 OBSTRUCTIVE SLEEP APNEA ON CPAP: Chronic | ICD-10-CM

## 2019-08-15 DIAGNOSIS — G81.91 RIGHT HEMIPARESIS (HCC): ICD-10-CM

## 2019-08-15 DIAGNOSIS — Z79.899 ON STATIN THERAPY DUE TO RISK OF FUTURE CARDIOVASCULAR EVENT: ICD-10-CM

## 2019-08-15 DIAGNOSIS — Z78.9 IMPAIRED MOBILITY AND ADLS: ICD-10-CM

## 2019-08-15 DIAGNOSIS — E87.6 CHRONIC HYPOKALEMIA: ICD-10-CM

## 2019-08-15 DIAGNOSIS — D50.9 IRON DEFICIENCY ANEMIA, UNSPECIFIED IRON DEFICIENCY ANEMIA TYPE: Chronic | ICD-10-CM

## 2019-08-15 DIAGNOSIS — Z79.82 CURRENT USE OF ASPIRIN: ICD-10-CM

## 2019-08-15 DIAGNOSIS — D51.9 ANEMIA DUE TO VITAMIN B12 DEFICIENCY, UNSPECIFIED B12 DEFICIENCY TYPE: Chronic | ICD-10-CM

## 2019-08-15 DIAGNOSIS — E11.22 TYPE 2 DIABETES MELLITUS WITH STAGE 2 CHRONIC KIDNEY DISEASE, WITHOUT LONG-TERM CURRENT USE OF INSULIN (HCC): Chronic | ICD-10-CM

## 2019-08-15 DIAGNOSIS — Z53.20 REFUSAL OF STATIN MEDICATION BY PATIENT: ICD-10-CM

## 2019-08-15 DIAGNOSIS — Z99.89 OBSTRUCTIVE SLEEP APNEA ON CPAP: Chronic | ICD-10-CM

## 2019-08-15 DIAGNOSIS — I13.10 HYPERTENSIVE HEART AND KIDNEY DISEASE WITHOUT HEART FAILURE AND WITH STAGE 2 CHRONIC KIDNEY DISEASE: Chronic | ICD-10-CM

## 2019-08-15 DIAGNOSIS — E78.00 PURE HYPERCHOLESTEROLEMIA: Chronic | ICD-10-CM

## 2019-08-15 DIAGNOSIS — R47.1 DYSARTHRIA: ICD-10-CM

## 2019-08-15 DIAGNOSIS — N18.2 CKD (CHRONIC KIDNEY DISEASE) STAGE 2, GFR 60-89 ML/MIN: Chronic | ICD-10-CM

## 2019-08-15 DIAGNOSIS — I11.9 HYPERTENSIVE HEART DISEASE WITHOUT HEART FAILURE: Chronic | ICD-10-CM

## 2019-08-15 DIAGNOSIS — R13.10 DYSPHAGIA, UNSPECIFIED TYPE: ICD-10-CM

## 2019-08-15 DIAGNOSIS — Z74.09 IMPAIRED MOBILITY AND ADLS: ICD-10-CM

## 2019-08-15 DIAGNOSIS — N18.2 HYPERTENSIVE HEART AND KIDNEY DISEASE WITHOUT HEART FAILURE AND WITH STAGE 2 CHRONIC KIDNEY DISEASE: Chronic | ICD-10-CM

## 2019-08-15 DIAGNOSIS — D72.829 LEUKOCYTOSIS, UNSPECIFIED TYPE: ICD-10-CM

## 2019-08-15 PROBLEM — Z92.82 RECEIVED INTRAVENOUS TISSUE PLASMINOGEN ACTIVATOR (TPA) IN EMERGENCY DEPARTMENT: Status: ACTIVE | Noted: 2019-08-12

## 2019-08-15 PROBLEM — I44.0 FIRST DEGREE ATRIOVENTRICULAR BLOCK BY ELECTROCARDIOGRAM: Status: ACTIVE | Noted: 2019-08-12

## 2019-08-15 LAB
ANION GAP SERPL CALC-SCNC: 11 MMOL/L (ref 3–18)
BUN SERPL-MCNC: 23 MG/DL (ref 7–18)
BUN/CREAT SERPL: 21 (ref 12–20)
CALCIUM SERPL-MCNC: 8.2 MG/DL (ref 8.5–10.1)
CHLORIDE SERPL-SCNC: 110 MMOL/L (ref 100–111)
CO2 SERPL-SCNC: 24 MMOL/L (ref 21–32)
CREAT SERPL-MCNC: 1.12 MG/DL (ref 0.6–1.3)
ERYTHROCYTE [DISTWIDTH] IN BLOOD BY AUTOMATED COUNT: 14.4 % (ref 11.6–14.5)
GLUCOSE BLD STRIP.AUTO-MCNC: 126 MG/DL (ref 70–110)
GLUCOSE BLD STRIP.AUTO-MCNC: 126 MG/DL (ref 70–110)
GLUCOSE SERPL-MCNC: 119 MG/DL (ref 74–99)
HCT VFR BLD AUTO: 31.3 % (ref 36–48)
HGB BLD-MCNC: 10.1 G/DL (ref 13–16)
MAGNESIUM SERPL-MCNC: 2 MG/DL (ref 1.6–2.6)
MCH RBC QN AUTO: 27.4 PG (ref 24–34)
MCHC RBC AUTO-ENTMCNC: 32.3 G/DL (ref 31–37)
MCV RBC AUTO: 84.8 FL (ref 74–97)
PHOSPHATE SERPL-MCNC: 3.3 MG/DL (ref 2.5–4.9)
PLATELET # BLD AUTO: 266 K/UL (ref 135–420)
PMV BLD AUTO: 11.9 FL (ref 9.2–11.8)
POTASSIUM SERPL-SCNC: 3 MMOL/L (ref 3.5–5.5)
RBC # BLD AUTO: 3.69 M/UL (ref 4.7–5.5)
SODIUM SERPL-SCNC: 145 MMOL/L (ref 136–145)
WBC # BLD AUTO: 11.7 K/UL (ref 4.6–13.2)

## 2019-08-15 PROCEDURE — 36415 COLL VENOUS BLD VENIPUNCTURE: CPT

## 2019-08-15 PROCEDURE — 74011250637 HC RX REV CODE- 250/637: Performed by: PHYSICIAN ASSISTANT

## 2019-08-15 PROCEDURE — 85027 COMPLETE CBC AUTOMATED: CPT

## 2019-08-15 PROCEDURE — 65310000000 HC RM PRIVATE REHAB

## 2019-08-15 PROCEDURE — 82962 GLUCOSE BLOOD TEST: CPT

## 2019-08-15 PROCEDURE — 83735 ASSAY OF MAGNESIUM: CPT

## 2019-08-15 PROCEDURE — 92526 ORAL FUNCTION THERAPY: CPT

## 2019-08-15 PROCEDURE — 74011250637 HC RX REV CODE- 250/637: Performed by: INTERNAL MEDICINE

## 2019-08-15 PROCEDURE — 74011000258 HC RX REV CODE- 258: Performed by: NURSE PRACTITIONER

## 2019-08-15 PROCEDURE — 74011250636 HC RX REV CODE- 250/636: Performed by: PSYCHIATRY & NEUROLOGY

## 2019-08-15 PROCEDURE — 92507 TX SP LANG VOICE COMM INDIV: CPT

## 2019-08-15 PROCEDURE — 80048 BASIC METABOLIC PNL TOTAL CA: CPT

## 2019-08-15 PROCEDURE — 74011250636 HC RX REV CODE- 250/636: Performed by: NURSE PRACTITIONER

## 2019-08-15 PROCEDURE — 84100 ASSAY OF PHOSPHORUS: CPT

## 2019-08-15 PROCEDURE — 74011250636 HC RX REV CODE- 250/636: Performed by: INTERNAL MEDICINE

## 2019-08-15 PROCEDURE — 74011250637 HC RX REV CODE- 250/637: Performed by: PSYCHIATRY & NEUROLOGY

## 2019-08-15 RX ORDER — AMLODIPINE BESYLATE 10 MG/1
10 TABLET ORAL DAILY
Status: DISCONTINUED | OUTPATIENT
Start: 2019-08-16 | End: 2019-09-05 | Stop reason: HOSPADM

## 2019-08-15 RX ORDER — LANOLIN ALCOHOL/MO/W.PET/CERES
400 CREAM (GRAM) TOPICAL DAILY
Status: DISCONTINUED | OUTPATIENT
Start: 2019-08-16 | End: 2019-08-28

## 2019-08-15 RX ORDER — HYDRALAZINE HYDROCHLORIDE 50 MG/1
50 TABLET, FILM COATED ORAL 3 TIMES DAILY
Qty: 90 TAB | Refills: 0 | Status: ON HOLD
Start: 2019-08-15 | End: 2019-09-04 | Stop reason: CLARIF

## 2019-08-15 RX ORDER — HEPARIN SODIUM 5000 [USP'U]/ML
5000 INJECTION, SOLUTION INTRAVENOUS; SUBCUTANEOUS EVERY 8 HOURS
Status: DISCONTINUED | OUTPATIENT
Start: 2019-08-15 | End: 2019-09-05 | Stop reason: HOSPADM

## 2019-08-15 RX ORDER — ACETAMINOPHEN 325 MG/1
650 TABLET ORAL
Qty: 30 TAB | Refills: 0 | Status: ON HOLD
Start: 2019-08-15 | End: 2019-09-04 | Stop reason: CLARIF

## 2019-08-15 RX ORDER — GUAIFENESIN 100 MG/5ML
81 LIQUID (ML) ORAL
Status: DISCONTINUED | OUTPATIENT
Start: 2019-08-16 | End: 2019-09-05 | Stop reason: HOSPADM

## 2019-08-15 RX ORDER — POTASSIUM CHLORIDE 1.5 G/1.77G
40 POWDER, FOR SOLUTION ORAL 2 TIMES DAILY WITH MEALS
Status: COMPLETED | OUTPATIENT
Start: 2019-08-15 | End: 2019-08-17

## 2019-08-15 RX ORDER — ATORVASTATIN CALCIUM 80 MG/1
80 TABLET, FILM COATED ORAL
Qty: 30 TAB | Refills: 0 | Status: ON HOLD
Start: 2019-08-15 | End: 2019-09-04 | Stop reason: CLARIF

## 2019-08-15 RX ORDER — ASPIRIN 81 MG/1
81 TABLET ORAL DAILY
Qty: 30 TAB | Refills: 0 | Status: ON HOLD | OUTPATIENT
Start: 2019-08-15 | End: 2019-09-04 | Stop reason: CLARIF

## 2019-08-15 RX ORDER — ATORVASTATIN CALCIUM 40 MG/1
80 TABLET, FILM COATED ORAL
Status: DISCONTINUED | OUTPATIENT
Start: 2019-08-15 | End: 2019-09-05 | Stop reason: HOSPADM

## 2019-08-15 RX ORDER — ACETAMINOPHEN 325 MG/1
650 TABLET ORAL
Status: DISCONTINUED | OUTPATIENT
Start: 2019-08-15 | End: 2019-09-05 | Stop reason: HOSPADM

## 2019-08-15 RX ORDER — ASPIRIN 325 MG
325 TABLET ORAL DAILY
Qty: 30 TAB | Refills: 0 | Status: SHIPPED
Start: 2019-08-15 | End: 2019-08-15

## 2019-08-15 RX ORDER — INSULIN LISPRO 100 [IU]/ML
INJECTION, SOLUTION INTRAVENOUS; SUBCUTANEOUS
Status: DISCONTINUED | OUTPATIENT
Start: 2019-08-15 | End: 2019-09-05 | Stop reason: HOSPADM

## 2019-08-15 RX ORDER — GEMFIBROZIL 600 MG/1
600 TABLET, FILM COATED ORAL 2 TIMES DAILY
COMMUNITY
End: 2019-09-05

## 2019-08-15 RX ORDER — HYDRALAZINE HYDROCHLORIDE 50 MG/1
50 TABLET, FILM COATED ORAL EVERY 8 HOURS
Status: DISCONTINUED | OUTPATIENT
Start: 2019-08-15 | End: 2019-08-15

## 2019-08-15 RX ORDER — METOPROLOL SUCCINATE 50 MG/1
50 TABLET, EXTENDED RELEASE ORAL DAILY
Status: DISCONTINUED | OUTPATIENT
Start: 2019-08-16 | End: 2019-08-16

## 2019-08-15 RX ORDER — LANOLIN ALCOHOL/MO/W.PET/CERES
400 CREAM (GRAM) TOPICAL DAILY
Qty: 30 TAB | Refills: 0 | Status: ON HOLD
Start: 2019-08-16 | End: 2019-09-04 | Stop reason: CLARIF

## 2019-08-15 RX ORDER — BISACODYL 5 MG
10 TABLET, DELAYED RELEASE (ENTERIC COATED) ORAL
Status: DISCONTINUED | OUTPATIENT
Start: 2019-08-15 | End: 2019-09-05 | Stop reason: HOSPADM

## 2019-08-15 RX ORDER — POTASSIUM CHLORIDE 1.5 G/1.77G
40 POWDER, FOR SOLUTION ORAL 2 TIMES DAILY WITH MEALS
Qty: 8 PACKET | Refills: 0 | Status: ON HOLD
Start: 2019-08-15 | End: 2019-09-04 | Stop reason: CLARIF

## 2019-08-15 RX ORDER — ASPIRIN 325 MG
325 TABLET ORAL DAILY
Status: ON HOLD | COMMUNITY
End: 2019-08-15

## 2019-08-15 RX ADMIN — POTASSIUM CHLORIDE 40 MEQ: 1.5 POWDER, FOR SOLUTION ORAL at 18:38

## 2019-08-15 RX ADMIN — Medication 10 ML: at 06:18

## 2019-08-15 RX ADMIN — METOPROLOL SUCCINATE 50 MG: 50 TABLET, EXTENDED RELEASE ORAL at 09:39

## 2019-08-15 RX ADMIN — HEPARIN SODIUM 5000 UNITS: 5000 INJECTION INTRAVENOUS; SUBCUTANEOUS at 06:33

## 2019-08-15 RX ADMIN — Medication 10 ML: at 06:19

## 2019-08-15 RX ADMIN — POTASSIUM CHLORIDE 40 MEQ: 1.5 POWDER, FOR SOLUTION ORAL at 09:38

## 2019-08-15 RX ADMIN — ATORVASTATIN CALCIUM 80 MG: 40 TABLET, FILM COATED ORAL at 21:17

## 2019-08-15 RX ADMIN — HYDRALAZINE HYDROCHLORIDE 75 MG: 50 TABLET, FILM COATED ORAL at 18:38

## 2019-08-15 RX ADMIN — HYDRALAZINE HYDROCHLORIDE 50 MG: 50 TABLET, FILM COATED ORAL at 09:39

## 2019-08-15 RX ADMIN — HEPARIN SODIUM 5000 UNITS: 5000 INJECTION INTRAVENOUS; SUBCUTANEOUS at 21:18

## 2019-08-15 RX ADMIN — AMLODIPINE BESYLATE 10 MG: 10 TABLET ORAL at 09:39

## 2019-08-15 RX ADMIN — HYDRALAZINE HYDROCHLORIDE 75 MG: 50 TABLET, FILM COATED ORAL at 21:18

## 2019-08-15 RX ADMIN — ASPIRIN 325 MG: 325 TABLET, FILM COATED ORAL at 09:39

## 2019-08-15 RX ADMIN — POTASSIUM CHLORIDE: 2 INJECTION, SOLUTION, CONCENTRATE INTRAVENOUS at 09:39

## 2019-08-15 RX ADMIN — HYDRALAZINE HYDROCHLORIDE 10 MG: 20 INJECTION, SOLUTION INTRAMUSCULAR; INTRAVENOUS at 09:38

## 2019-08-15 NOTE — PROGRESS NOTES
Problem: Motor Speech Impaired (Adult)  Goal: *Acute Goals and Plan of Care (Insert Text)  Description  Pt will:    1. Utilize compensatory strategies (decrease rate, overarticulate, increase intensity) to increase intelligibility to >80% at conversation level with min A visual/verbal cues in 4/5 trials. 2. Perform oral motor exercises (with and without resistance) in therapy and at home to increase oral motor strength/range-of-motion for articulation tasks with min A with visual/verbal cues in 4/5 trials. 3. Complete articulatory agility tasks (reading-conversation) with min A with visual/verbal cues in 4/5 trials. Outcome: Progressing Towards Goal     SPEECH LANGUAGE PATHOLOGY   SPEECH-LANGUAGE TREATMENT    Patient: Daniella Lopez (88 y.o. male)  Date: 8/15/2019  Diagnosis: CVA (cerebral vascular accident) [facility]) [I63.9]   Precautions: Aspiration  PLOF: Speech/voice within functional limits     ASSESSMENT:  Pt seen to address the following goals. Pt highly motivated for participation in therapy. 1. Utilize compensatory strategies (decrease rate, overarticulate, increase intensity) to increase intelligibility to >80% at conversation level with min A visual/verbal cues in 4/5 trials:  ~80% at word level, 75% in conversation given mod cues for use of comp speech strategies. 2. Perform oral motor exercises (with and without resistance) in therapy and at home to increase oral motor strength/range-of-motion for articulation tasks with min A with visual/verbal cues in 4/5 trials:  pt able to complete with mod cues for comp speech strategies. 3. Complete articulatory agility tasks (reading-conversation) with min A with visual/verbal cues in 4/5 trials:  Pt able to complete with mod verbal/visual demo/cues with x60% accuracy. Progression toward goals:  ?       Improving appropriately and progressing toward goals  ? Improving slowly and progressing toward goals  ?        Not making progress toward goals and plan of care will be adjusted     PLAN:  Patient continues to benefit from skilled intervention to address the above impairments. Continue treatment per established plan of care. Discharge Recommendations:  Inpatient Rehab     SUBJECTIVE:   Patient stated That was a hard one. OBJECTIVE:   Mental Status:  Neurologic State: Alert  Orientation Level: Oriented X4  Cognition: Follows commands  Perception: Appears intact  Perseveration: No perseveration noted  Safety/Judgement: Fall prevention  Treatment & Interventions:  See above  Response & Tolerance to Activities:   Excellent    PAIN:  Start of Tx: 0  End of Tx: 0     After treatment:   ?       Patient left in no apparent distress sitting up in chair  ? Patient left in no apparent distress in bed  ? Call bell left within reach  ? Nursing notified  ? Caregiver present  ? Bed alarm activated      COMMUNICATION/EDUCATION:   ? Compensatory speech/language/comprehension techniques  ? Patient/family able to participate in training and education   ?   Patient unable to participate in education; education ongoing with staff    Velasquez Garza M.S., [id number] [facility]  Speech-Language Pathologist

## 2019-08-15 NOTE — DISCHARGE INSTRUCTIONS
DISCHARGE SUMMARY from Nurse    PATIENT INSTRUCTIONS:    After general anesthesia or intravenous sedation, for 24 hours or while taking prescription Narcotics:  · Limit your activities  · Do not drive and operate hazardous machinery  · Do not make important personal or business decisions  · Do  not drink alcoholic beverages  · If you have not urinated within 8 hours after discharge, please contact your surgeon on call. Report the following to your surgeon:  · Excessive pain, swelling, redness or odor of or around the surgical area  · Temperature over 100.5  · Nausea and vomiting lasting longer than 4 hours or if unable to take medications  · Any signs of decreased circulation or nerve impairment to extremity: change in color, persistent  numbness, tingling, coldness or increase pain  · Any questions    What to do at Home:  Recommended activity: Activity as tolerated    If you experience any of the following symptoms chest pain, shortness of breath, fever, chills weakness, fatigue, please follow up with 911 or PCP. *  Please give a list of your current medications to your Primary Care Provider. *  Please update this list whenever your medications are discontinued, doses are      changed, or new medications (including over-the-counter products) are added. *  Please carry medication information at all times in case of emergency situations. These are general instructions for a healthy lifestyle:    No smoking/ No tobacco products/ Avoid exposure to second hand smoke  Surgeon General's Warning:  Quitting smoking now greatly reduces serious risk to your health.     Obesity, smoking, and sedentary lifestyle greatly increases your risk for illness    A healthy diet, regular physical exercise & weight monitoring are important for maintaining a healthy lifestyle    You may be retaining fluid if you have a history of heart failure or if you experience any of the following symptoms:  Weight gain of 3 pounds or more overnight or 5 pounds in a week, increased swelling in our hands or feet or shortness of breath while lying flat in bed. Please call your doctor as soon as you notice any of these symptoms; do not wait until your next office visit. The discharge information has been reviewed with the patient. The patient verbalized understanding. Discharge medications reviewed with the patient  Patient Education        Stroke: Care Instructions  Your Care Instructions    You have had a stroke. This means that the blood flow to a part of your brain was blocked for some time, which damages the nerve cells in that part of the brain. The part of your body controlled by that part of your brain may not function properly now. The brain is an amazing organ that can heal itself to some degree. The stroke you had damaged part of your brain. But other parts of your brain may take over in some way for the damaged areas. You have already started this process. Your doctor will talk with you about what you can do to prevent another stroke. High blood pressure, high cholesterol, and diabetes are all risk factors for stroke. If you have any of these conditions, work with your doctor to make sure they are under control. Other risk factors for stroke include being overweight, smoking, and not getting regular exercise. Going home may be hard for you and your family. The more you can try to do for yourself, the better. Remember to take each day one at a time. Follow-up care is a key part of your treatment and safety. Be sure to make and go to all appointments, and call your doctor if you are having problems. It's also a good idea to know your test results and keep a list of the medicines you take. How can you care for yourself at home?    · Enter a stroke rehabilitation (rehab) program, if your doctor recommends it.  Physical, speech, and occupational therapies can help you manage bathing, dressing, eating, and other basics of daily living.     · Do not drive until your doctor says it is okay.     · It is normal to feel sad or depressed after a stroke. If these feelings last, talk to your doctor.     · If you are having problems with urine leakage, go to the bathroom at regular times, including when you first wake up and at bedtime. Also, limit fluids after dinner.     · If you are constipated, drink plenty of fluids, enough so that your urine is light yellow or clear like water. If you have kidney, heart, or liver disease and have to limit fluids, talk with your doctor before you increase the amount of fluids you drink. Set up a regular time for using the toilet. If you continue to have constipation, your doctor may suggest using a bulking agent, such as Metamucil, or a stool softener, laxative, or enema. Medicines    · Take your medicines exactly as prescribed. Call your doctor if you think you are having a problem with your medicine. You may be taking several medicines. ACE (angiotensin-converting enzyme) inhibitors, angiotensin II receptor blockers (ARBs), beta-blockers, diuretics (water pills), and calcium channel blockers control your blood pressure. Statins help lower cholesterol. Your doctor may also prescribe medicines for depression, pain, sleep problems, anxiety, or agitation.     · If your doctor has given you a blood thinner to prevent another stroke, be sure you get instructions about how to take your medicine safely. Blood thinners can cause serious bleeding problems.     · Do not take any over-the-counter medicines or herbal products without talking to your doctor first.     · If you take birth control pills or hormone therapy, talk to your doctor about whether they are right for you.    For family members and caregivers    · Make the home safe. Set up a room so that your loved one does not have to climb stairs. Be sure the bathroom is on the same floor. Move throw rugs and furniture that could cause falls.  Make sure that the lighting is good. Put grab bars and seats in tubs and showers.     · Find out what your loved one can do and what he or she needs help with. Try not to do things for your loved one that your loved one can do on his or her own. Help him or her learn and practice new skills.     · Visit and talk with your loved one often. Try doing activities together that you both enjoy, such as playing cards or board games. Keep in touch with your loved one's friends as much as you can. Encourage them to visit.     · Take care of yourself. Do not try to do everything yourself. Ask other family members to help. Eat well, get enough rest, and take time to do things that you enjoy. Keep up with your own doctor visits, and make sure to take your medicines regularly. Get out of the house as much as you can. Join a local support group. Find out if you qualify for home health care visits to help with rehab or for adult day care. When should you call for help? Call 911 anytime you think you may need emergency care. For example, call if:    · You have signs of another stroke. These may include:  ? Sudden numbness, tingling, weakness, or loss of movement in your face, arm, or leg, especially on only one side of your body. ? Sudden vision changes. ? Sudden trouble speaking. ? Sudden confusion or trouble understanding simple statements. ? Sudden problems with walking or balance. ? A sudden, severe headache that is different from past headaches. Call 911 even if these symptoms go away in a few minutes.    Call your doctor now or seek immediate medical care if:    · You have new symptoms that may be related to your stroke, such as falls or trouble swallowing.    Watch closely for changes in your health, and be sure to contact your doctor if you have any problems. Where can you learn more? Go to http://tereza-freddie.info/. Enter Y411 in the search box to learn more about \"Stroke: Care Instructions. \"  Current as of: September 26, 2018  Content Version: 12.1 © 2006-2019 [employer]. Care instructions adapted under license by [employer] (which disclaims liability or warranty for this information). If you have questions about a medical condition or this instruction, always ask your healthcare professional. [address] any warranty or liability for your use of this information.   ___________________________________________________________________________________________________________________________________

## 2019-08-15 NOTE — ROUTINE PROCESS
TRANSFER - OUT REPORT:    Verbal report given to MAURO Olson(name) on Yola Risk  being transferred to ARU(unit) for routine progression of care       Report consisted of patients Situation, Background, Assessment and   Recommendations(SBAR). Information from the following report(s) SBAR, Kardex and Recent Results was reviewed with the receiving nurse. Lines:   Peripheral IV 08/12/19 Left Antecubital (Active)   Site Assessment Clean, dry, & intact 8/15/2019  4:10 AM   Phlebitis Assessment 0 8/15/2019  4:10 AM   Infiltration Assessment 0 8/15/2019  4:10 AM   Dressing Status Clean, dry, & intact 8/15/2019  4:10 AM   Dressing Type Transparent 8/15/2019  4:10 AM   Hub Color/Line Status Green 8/15/2019  4:10 AM   Action Taken Open ports on tubing capped 8/15/2019  4:10 AM   Alcohol Cap Used Yes 8/15/2019  4:10 AM       Peripheral IV 08/12/19 Right Antecubital (Active)   Site Assessment Clean, dry, & intact 8/15/2019  4:10 AM   Phlebitis Assessment 0 8/15/2019  4:10 AM   Infiltration Assessment 0 8/15/2019  4:10 AM   Dressing Status Clean, dry, & intact 8/15/2019  4:10 AM   Dressing Type Transparent 8/15/2019  4:10 AM   Hub Color/Line Status Flushed;Green 8/15/2019  4:10 AM   Action Taken Open ports on tubing capped 8/15/2019  4:10 AM   Alcohol Cap Used Yes 8/15/2019  4:10 AM        Opportunity for questions and clarification was provided.       Patient transported with:   [email or web address]

## 2019-08-15 NOTE — PROGRESS NOTES
Re:  Kimberly Rios,Follow up visit     8/15/2019 1:26 PM    SSN: [id number]    Subjective:   Savi Carrasco is seen in follow up, his daughter is present. He has no complaints. New nose bleed.     Medications:    Current Facility-Administered Medications   Medication Dose Route Frequency Provider Last Rate Last Dose    atorvastatin (LIPITOR) tablet 80 mg  80 mg Oral QHS Jerad Hernández MD   80 mg at 08/14/19 2208    hydrALAZINE (APRESOLINE) tablet 50 mg  50 mg Oral TID Jerad Hernández MD   50 mg at 08/15/19 5339    potassium chloride (KLOR-CON) packet for solution 40 mEq  40 mEq Oral BID WITH MEALS Jerad Hernández MD   40 mEq at 08/15/19 9197    magnesium oxide (MAG-OX) tablet 400 mg  400 mg Oral DAILY Jerad Hernández MD   400 mg at 08/14/19 2010    acetaminophen (TYLENOL) tablet 650 mg  650 mg Oral Q6H PRN Jerad Hernández MD        [facility]etron [facility]) injection 4 mg  4 mg IntraVENous Q8H PRN Jerad Hernández MD        hydrALAZINE (APRESOLINE) 20 mg/mL injection 10 mg  10 mg IntraVENous Q6H PRN Paulo BURROWS NP   10 mg at 08/15/19 9378    amLODIPine (NORVASC) tablet 10 mg  10 mg Oral DAILY Mary Grace Awad PA-C   10 mg at 08/15/19 4065    metoprolol succinate (TOPROL-XL) XL tablet 50 mg  50 mg Oral DAILY Brandee Danielle PA-C   50 mg at 08/15/19 3945    sodium chloride (NS) flush 5-40 mL  5-40 mL IntraVENous Q8H Pacheco Lemus MD   10 mL at 08/15/19 0618    sodium chloride (NS) flush 5-40 mL  5-40 mL IntraVENous PRN Pacheco Lemus MD        aspirin tablet 325 mg  325 mg Oral DAILY Pacheco Lemus MD   325 mg at 08/15/19 1951    heparin (porcine) injection 5,000 Units  5,000 Units SubCUTAneous Q8H Pacheco Lemus MD   5,000 Units at 08/15/19 1847    sodium chloride (NS) flush 5-40 mL  5-40 mL IntraVENous Q8H Jeanine DING NP   10 mL at 08/15/19 2412    sodium chloride (NS) flush 5-40 mL  5-40 mL IntraVENous PRN Faheem Gottlieb NP           Vital signs:    Visit Vitals  BP 180/86 (BP 1 Location: Left arm, BP Patient Position: At rest)   Pulse 83   Temp 98.7 °F (37.1 °C)   Resp 18   Ht 5' 9\" (1.753 m)   Wt 102 kg (224 lb 14.4 oz)   SpO2 97%   BMI 33.21 kg/m²       Review of Systems:   As above otherwise 11 point review of systems negative including;   Constitutional no fever or chills  Skin denies rash or itching  HEENT  Denies tinnitus, hearing lose  Eyes denies diplopia vision lose  Respiratory denies sortness of breath  Cardiovascular denies chest pain, dyspnea on exertion  Gastrointestinal denies nausea, vomiting, diarrhea, constipation  Genitourinary denies incontinence  Musculoskeletal denies joint pain or swelling  Endocrine denies weight change  Hematology denies easy bruising or bleeding   Neurological as above in HPI      Patient Active Problem List   Diagnosis Code    Obesity, Class I, BMI 30-34.9 E66.9    Acute ischemic stroke (HCC) I63.9    Obstructive sleep apnea on CPAP G47.33, Z99.89    Impaired mobility and ADLs Z74.09    Received intravenous tissue plasminogen activator (tPA) in emergency department Z92.82    Hypertensive heart disease without heart failure I11.9    Right hemiparesis (HCC) G81.91    Dysphagia R13.10    Dysarthria R47.1    Chronic hypokalemia E87.6    CKD (chronic kidney disease) stage 2, GFR 60-89 ml/min N18.2    Current use of aspirin Z79.82    On statin therapy due to risk of future cardiovascular event Z79.899    Type 2 diabetes mellitus with stage 2 chronic kidney disease (HCC) E11.22, N18.2    Pure hypercholesterolemia E78.00    Elevated prostate specific antigen (PSA) R97.20         Objective: The patient is awake, alert, and oriented x 4. Fund of knowledge is adequate. Speech is slurred but fluent and memory is intact. Cranial Nerves: II  Visual fields are full to confrontation. III, IV, VI  Extraocular movements are intact. There is no nystagmus. V  Facial sensation is intact to pinprick.   VII  Face is asymmetrical, dense right facial.  VIII - Hearing is present. IX, X, XII  Palate is symmetrical.   XI - Shoulder shrugging and head turning intact  Motor: The patient has a dense right arm plegia, about 2/5 in the leg. Left side is 5/5. Tone is normal. Reflexes are 2+ and symmetrical. Plantars are up going on the right. Gait is not testable. Lab Results   Component Value Date/Time    Glucose 119 (H) 08/15/2019 03:30 AM    Sodium 145 08/15/2019 03:30 AM    Potassium 3.0 (L) 08/15/2019 03:30 AM    Chloride 110 08/15/2019 03:30 AM    CO2 24 08/15/2019 03:30 AM    BUN 23 (H) 08/15/2019 03:30 AM    Creatinine 1.12 08/15/2019 03:30 AM    Calcium 8.2 (L) 08/15/2019 03:30 AM     CMP:   Lab Results   Component Value Date/Time    Glucose 119 (H) 08/15/2019 03:30 AM    Sodium 145 08/15/2019 03:30 AM    Potassium 3.0 (L) 08/15/2019 03:30 AM    Chloride 110 08/15/2019 03:30 AM    CO2 24 08/15/2019 03:30 AM    BUN 23 (H) 08/15/2019 03:30 AM    Creatinine 1.12 08/15/2019 03:30 AM    Calcium 8.2 (L) 08/15/2019 03:30 AM    Anion gap 11 08/15/2019 03:30 AM    BUN/Creatinine ratio 21 (H) 08/15/2019 03:30 AM     Coagulation: No results found for: PTP, INR, APTT, PTTT  Cardiac markers:   Lab Results   Component Value Date/Time     (H) 08/13/2019 12:05 PM    CK-MB Index 0.4 08/13/2019 12:05 PM     8/13/2019  4:37 AM - [facility], [facility]     Component Value Flag Ref Range Units Status   Hemoglobin A1c 6.6  High   4.2 - 5.6 % Final   Comment:     8/13/2019  5:10 AM - [facility], [facility]     Component Value Flag Ref Range Units Status   LIPID PROFILE          Final   Cholesterol, total 185   <200 MG/DL Final   Triglyceride 164  High   <150 MG/DL Final   Comment:   The drugs N-acetylcysteine (NAC) and   Metamiszole have been found to cause falsely   low results in this chemical assay. Please   be sure to submit blood samples obtained   BEFORE administration of either of these   drugs to assure correct results.     HDL Cholesterol 42   40 - 60 MG/DL Final   LDL, calculated 110.2  High   0 - 100 MG/DL Final   VLDL, calculated 32.8    MG/DL Final   CHOL/HDL Ratio 4.4   0 - 5.0   Final         Assessment:  New left lacune CVA who has risk factors including hypertension, diabetes. Please note patient refuses statin agent because of potential side effects. Plan:  Needs rehab. OK to rehab from my standpoint. OK to decrease aspirin for now. Sincerely,        Sony Burch.  Sam Nielsen M.D.

## 2019-08-15 NOTE — PROGRESS NOTES
1925- Bedside report given. Wife at bedside. Plan of care updated. Call to physician from day shift  RN Keith Roa for PRN medication for systolic greater than 492.    2015- Bladder scanned due to voiding 50 ml to check for post void residual. Bladder scan showed. 90 ml. No intervention needed at this time. \    2025- Hydralazine given for elevated blood pressure. Patient tolerated well. Call bell within reach. Bed alarm on.     6432- Systolic blood pressure is down to 186. Scheduled PO Hydralazine is ordered now. Will follow blood pressure. Bedside shift change report given to [address],[address] (oncoming nurse) by [facility]). Report included the following information Kardex.

## 2019-08-15 NOTE — PROGRESS NOTES
Spoke with Marisol Dent, and have accepted pt for later this afternoon. Pt aware and agreeable.   Maricruz Garibay, -[phone]

## 2019-08-15 NOTE — ROUTINE PROCESS
1600 PT. Arrived from [address] [place] [address] to be admitted to rehab room 179  Alert and oriented x 4 accompanied with family. Dr. Pranav Ramos was notified and stated will have admission orders. 4 eyes assessment completed with Shraddha Martin LPN  Skin tear to  Right wrist, no pressure ulcers noted. Skin intact. 1800 Pt. Sitting up in bed eating dinner family in room. Dr. Pranav Ramos arrived to assess pt. And talk with family.

## 2019-08-15 NOTE — DISCHARGE SUMMARY
[facility]  Discharge Summary       Patient: Clara Castrejon Age: 71 y.o. : 1955 MR#: [id number] [id number]: [id number]  PCP on record: Timmy George MD  Admit date: 2019  Discharge date: 8/15/2019    Disposition:    []Home   []Home with Home Health   []SNF/NH   [x]Rehab   []Home with family   []Alternate Facility:____________________    Admission Diagnoses:  CVA (cerebral vascular accident) [facility]) [I63.9]    Discharge Diagnoses:                             1. Acute CVA with right sided deficit   2. HTN  3. Hypokalemia  4. MARISSA     Discharge Medications:     Current Discharge Medication List      START taking these medications    Details   atorvastatin (LIPITOR) 80 mg tablet Take 1 Tab by mouth nightly. Indications: stroke prevention  Qty: 30 Tab, Refills: 0      magnesium oxide (MAG-OX) 400 mg tablet Take 1 Tab by mouth daily. Qty: 30 Tab, Refills: 0      potassium chloride (KLOR-CON) 20 mEq pack Take 2 Packets by mouth two (2) times daily (with meals) for 2 days. Qty: 8 Packet, Refills: 0      acetaminophen (TYLENOL) 325 mg tablet Take 2 Tabs by mouth every six (6) hours as needed for Pain. Qty: 30 Tab, Refills: 0      hydrALAZINE (APRESOLINE) 50 mg tablet Take 1 Tab by mouth three (3) times daily. Qty: 90 Tab, Refills: 0      aspirin delayed-release 81 mg tablet Take 1 Tab by mouth daily for 30 days. Qty: 30 Tab, Refills: 0         CONTINUE these medications which have NOT CHANGED    Details   amLODIPine (NORVASC) 10 mg tablet Take  by mouth daily. metoprolol succinate (TOPROL-XL) 50 mg XL tablet Take  by mouth daily. ALLOPURINOL PO Take 300 mg by mouth daily.          STOP taking these medications       potassium chloride (K-DUR, KLOR-CON) 20 mEq tablet Comments:   Reason for Stopping:         losartan-hydroCHLOROthiazide (HYZAAR) 100-25 mg per tablet Comments:   Reason for Stopping:         GEMFIBROZIL PO Comments:   Reason for Stopping: Consults:    - neurology   - endocrinology consulted     Procedures:  -   None    Significant Diagnostic Studies:   -  Xr Swallow Func Video    Result Date: 8/14/2019  IMPRESSION: 1. Silent tracheal aspiration of thin liquids. 2. Laryngeal penetration of nectar consistency. 3. No drew penetration or aspiration with other tested consistencies. Please see speech pathologist report for additional details and recommendations. Mri Brain Wo Cont    Result Date: 8/12/2019  IMPRESSION: 1. Study is positive for a small region of acute/early subacute infarct at the left posterior basal ganglia to corona radiata. No hemorrhage or mass effect. 2. Information submitted to the consulting neurologist Dr. Day Lopez via the [employer] system upon interpretation 8/12/2019 at 1540 hours. Thank you for enabling us to participate in the care of this patient. Ct Head Wo Cont    Result Date: 8/13/2019  IMPRESSION[de-identified] 1.  2.5 x 1.6 cm infarct in the left basal ganglia. 2. No acute intracranial hemorrhage. 3. No significant mass effect or midline shift. Thank you for this referral.    Ct Head Wo Cont    Result Date: 8/12/2019  IMPRESSION[de-identified] 1.  No acute intracranial pathology. No acute hemorrhage. 2. Mild chronic small vessel ischemic change is noted. Report called to ER at 8:09 AM. Thank you for this referral.    Cta Head Neck W Wo Cont    Result Date: 8/12/2019  Impression: 1. Patent intra- and extracranial cerebral arteries. No evidence of high-grade stenosis or acute dissection. 2. There is atherosclerotic disease with mild stenosis of the left MCA at the left trifurcation. 3. Other incidental findings as described above. Thank you for enabling us to participate in the care of this patient. ECHO ADULT COMPLETE W BUBBLE STUDY on 08/13/2019  · Left Ventricle: Normal cavity size. Moderate concentric hypertrophy. Low normal systolic dysfunction. The estimated ejection fraction is 55%. No regional wall motion abnormality noted. Inconclusive left ventricular diastolic function. · Right Ventricle: Mildly dilated right ventricle. · Right Atrium: Mildly dilated right atrium. · Interatrial Septum: Agitated saline contrast study was performed and color flow Doppler was used. No atrial septal defect present. Hospital Course by Problem   1. Acute CVA with right sided deficit- s/p tPA without complication. Appears stable with slight improvement in symptoms. Started on low dose aspirin 325mg during admission however with nose bleed x 1 prior to discharge, discussed with neurology Dr. Caitlin saravia to continue with asa 81mg only. Patient agreeable to statin start during admission, hold gemfibrozil for now minimize risk of SE. Could resume at later time if appropriate. BP control with continued rehab efforts through [facility]. 2. HTN - Home hyzaar was on hold due to MARISSA and allowing permissive hypertension initially. On home Norvasc and Toprol-XL dose with addition of hydralazine. Consider increase in hydralazine and/or reintroduction of ARB if renal function stable and continued elevation of blood pressure. Call placed to endocrinology regarding eval for ? Primary hyperaldosteronism. Dr. Cortney Ríos added to treatment team.    3. HypoK+ - repleting during admission with magnesium level at 2.0 prior to discharge with potassium level of 3.0 at time of discharge. Patient is off HCTZ at this point and oral repletion is continuing. See consult for ? Primary hyperaldosteronism. 4. MARISSA - elevated to 1.54 at time of admission and stable at 1.12 prior to discharge. Today's examination of the patient revealed:     Subjective:   Denies pain, continued weakness of right side; right leg \"feels like in a hole\". No N/V, CP, SOB. Speech improved some; No HA, vision change, bruising.    Objective:   VS:   Visit Vitals  /86 (BP 1 Location: Left arm, BP Patient Position: At rest)   Pulse 83   Temp 98.7 °F (37.1 °C)   Resp 18   Ht 5' 9\" (1.753 m)   Wt 102 kg (224 lb 14.4 oz)   SpO2 97%   BMI 33.21 kg/m²      Tmax/24hrs: Temp (24hrs), Av.7 °F (37.1 °C), Min:98.2 °F (36.8 °C), Max:98.9 °F (37.2 °C)     Input/Output:     Intake/Output Summary (Last 24 hours) at 8/15/2019 1104  Last data filed at 8/15/2019 0336  Gross per 24 hour   Intake 200 ml   Output 1250 ml   Net -1050 ml     General:  Awake, alert, NAD. Cardiovascular:  RRR. Pulmonary:  CTA B.  GI:  Soft, NT/ND, NABS. Extremities:  No CT or edema. 0/5 motor RUE, 2/5 motor RLE. 5/5 for LUE and LLE. Neuro: oriented x 3  Additional:  Dysarthric speech. Labs:    Recent Results (from the past 24 hour(s))   CBC WITH AUTOMATED DIFF    Collection Time: 19  6:48 PM   Result Value Ref Range    WBC 10.9 4.6 - 13.2 K/uL    RBC 3.74 (L) 4.70 - 5.50 M/uL    HGB 10.0 (L) 13.0 - 16.0 g/dL    HCT 31.6 (L) 36.0 - 48.0 %    MCV 84.5 74.0 - 97.0 FL    MCH 26.7 24.0 - 34.0 PG    MCHC 31.6 31.0 - 37.0 g/dL    RDW 14.4 11.6 - 14.5 %    PLATELET 914 354 - 787 K/uL    MPV 11.3 9.2 - 11.8 FL    NEUTROPHILS 73 40 - 73 %    LYMPHOCYTES 17 (L) 21 - 52 %    MONOCYTES 6 3 - 10 %    EOSINOPHILS 3 0 - 5 %    BASOPHILS 1 0 - 2 %    ABS. NEUTROPHILS 8.1 (H) 1.8 - 8.0 K/UL    ABS. LYMPHOCYTES 1.8 0.9 - 3.6 K/UL    ABS. MONOCYTES 0.6 0.05 - 1.2 K/UL    ABS. EOSINOPHILS 0.3 0.0 - 0.4 K/UL    ABS.  BASOPHILS 0.1 0.0 - 0.1 K/UL    DF AUTOMATED     METABOLIC PANEL, BASIC    Collection Time: 19  6:48 PM   Result Value Ref Range    Sodium 143 136 - 145 mmol/L    Potassium 2.8 (LL) 3.5 - 5.5 mmol/L    Chloride 108 100 - 111 mmol/L    CO2 27 21 - 32 mmol/L    Anion gap 8 3.0 - 18 mmol/L    Glucose 148 (H) 74 - 99 mg/dL    BUN 26 (H) 7.0 - 18 MG/DL    Creatinine 1.31 (H) 0.6 - 1.3 MG/DL    BUN/Creatinine ratio 20 12 - 20      GFR est AA >60 >60 ml/min/1.73m2    GFR est non-AA 55 (L) >60 ml/min/1.73m2    Calcium 8.1 (L) 8.5 - 10.1 MG/DL   CBC W/O DIFF    Collection Time: 08/15/19  3:30 AM   Result Value Ref Range    WBC 11.7 4.6 - 13.2 K/uL    RBC 3.69 (L) 4.70 - 5.50 M/uL    HGB 10.1 (L) 13.0 - 16.0 g/dL    HCT 31.3 (L) 36.0 - 48.0 %    MCV 84.8 74.0 - 97.0 FL    MCH 27.4 24.0 - 34.0 PG    MCHC 32.3 31.0 - 37.0 g/dL    RDW 14.4 11.6 - 14.5 %    PLATELET 065 929 - 205 K/uL    MPV 11.9 (H) 9.2 - 11.8 FL   MAGNESIUM    Collection Time: 08/15/19  3:30 AM   Result Value Ref Range    Magnesium 2.0 1.6 - 2.6 mg/dL   METABOLIC PANEL, BASIC    Collection Time: 08/15/19  3:30 AM   Result Value Ref Range    Sodium 145 136 - 145 mmol/L    Potassium 3.0 (L) 3.5 - 5.5 mmol/L    Chloride 110 100 - 111 mmol/L    CO2 24 21 - 32 mmol/L    Anion gap 11 3.0 - 18 mmol/L    Glucose 119 (H) 74 - 99 mg/dL    BUN 23 (H) 7.0 - 18 MG/DL    Creatinine 1.12 0.6 - 1.3 MG/DL    BUN/Creatinine ratio 21 (H) 12 - 20      GFR est AA >60 >60 ml/min/1.73m2    GFR est non-AA >60 >60 ml/min/1.73m2    Calcium 8.2 (L) 8.5 - 10.1 MG/DL   PHOSPHORUS    Collection Time: 08/15/19  3:30 AM   Result Value Ref Range    Phosphorus 3.3 2.5 - 4.9 MG/DL     Additional Data Reviewed:     Condition: Stable  Follow-up Appointments:   Provider at [facility] within 1 day, follow up with PCP within 5-7 days post discharge from [facility] Dr. Williams Varner in 3 weeks    >30 minutes spent coordinating this discharge (review instructions/follow-up, prescriptions, preparing report for sign off)    Signed:  Luis Rawls NP  8/15/2019  11:04 AM

## 2019-08-15 NOTE — PROGRESS NOTES
Problem: Dysphagia (Adult)  Goal: *Acute Goals and Plan of Care (Insert Text)  Description  Patient will:  1. Tolerate PO trials with 0 s/s overt distress in 4/5 trials  2. Utilize compensatory swallow strategies/maneuvers (decrease bite/sip, size/rate, alt. liq/sol) with min cues in 4/5 trials  3. Perform oral-motor/laryngeal exercises to increase oropharyngeal swallow function with min cues  4. Complete an objective swallow study (i.e., MBSS) to assess swallow integrity, r/o aspiration, and determine of safest LRD, min A as indicated/ordered by MD-met 8/14/19    Recommend:   Mech soft, honey thick liquids   Meds with honey thick liquids or in puree   Aspiration precautions  HOB >45 degrees during all intake and for at least 30 min after po   Small bites/sips, slow rate of intake, alternating bites/sips  Oral care three three times daily        Outcome: Progressing Towards Goal    SPEECH LANGUAGE PATHOLOGY DYSPHAGIA TREATMENT    Patient: Frederick Santiago (63 y.o. male)  Date: 8/15/2019  Diagnosis: CVA (cerebral vascular accident) [facility]) [I63.9]   Precautions: Aspiration, Fall  PLOF: Regular diet with thin liquids     ASSESSMENT:  Pt seen for follow up dysphagia treatment. Pt reporting \"breakfast was good but I can't eat those pears they are sending me\". Pt continuing to tolerate honey thick liquids with no overt s/sx aspiration. Pt able to complete effortful swallow x10 reps and rickey x10 reps given min cues, provided handout and encouraged to complete exercises on own. Pt re-educated with regard to MBS results including silent airway compromise with thin and nectar thick liquids. Pt able to verbalize understanding. Will continue to follow for further dysphagia management. Pt highly motivated for participation and engaged in all training and education. Progression toward goals:  ?         Improving appropriately and progressing toward goals  ? Improving slowly and progressing toward goals  ? Not making progress toward goals and plan of care will be adjusted     PLAN:  Recommendations and Planned Interventions:  Patient continues to benefit from skilled intervention to address the above impairments. Continue treatment per established plan of care. Discharge Recommendations:  Inpatient Rehab     SUBJECTIVE:   Patient stated The pears they are sending me are too hard. OBJECTIVE:   Cognitive and Communication Status:  Neurologic State: Alert  Orientation Level: Oriented X4  Cognition: Follows commands  Perception: Appears intact  Perseveration: No perseveration noted  Safety/Judgement: Fall prevention  Dysphagia Treatment:  Oral Assessment:  Oral Assessment  Labial: Right droop, Impaired coordination, Decreased seal  Dentition: Natural  Oral Hygiene: Good  Lingual: Incoordinated  Velum: Unable to visualize  Mandible: No impairment  P.O. Trials:   Patient Position: [facility] at [facility]   Vocal quality prior to P.O.: Low volume, Breathy   Consistency Presented:  Honey thick liquids    How Presented:  Cup, Self    Bolus Acceptance: No impairment   Bolus Formation/Control: Impaired   Type of Impairment: Mastication, Delayed   Propulsion: Delayed (# of seconds)   Oral Residue: Lingual, Less than 10% of bolus   Initiation of Swallow: Delayed (# of seconds)   Laryngeal Elevation: Decreased   Aspiration Signs/Symptoms:  None    Pharyngeal Phase Characteristics: Effortful swallow   Effective Modifications: Small sips and bites, Cup/sip   Cues for Modifications: Minimal   Oral Phase Severity: Mild-Mod    Pharyngeal Phase Severity : Moderate     PAIN:  Start of Tx: 0  End of Tx: 0     After treatment:   ?              Patient left in no apparent distress sitting up in chair  ? Patient left in no apparent distress in bed  ? Call bell left within reach  ? Nursing notified  ? Family present  ? Caregiver present  ?               Bed alarm activated      COMMUNICATION/EDUCATION:   ? Aspiration precautions; swallow safety; compensatory techniques  ? Patient/family able to participate in training and education   ? Patient unable to participate in training and education, education ongoing with staff   ?  Patient understands goals and intent of therapy; neutral about participation     Karlene Espino M.S., [id number] [facility]  Speech-Language Pathologist

## 2019-08-15 NOTE — ROUTINE PROCESS
1900 Bedside and verbal report given to Ashley Forde RN ( oncoming nurse) by Carina Mehta RN (off going nurse)

## 2019-08-15 NOTE — H&P
[address] [place] [address], [address]     INPATIENT REHABILITATION  HISTORY AND PHYSICAL    Name: Meghana Bolton CSN: [id number]   Age: 71 y.o. MRN: [id number]   Sex: male Admit Date: 8/15/2019     PCP: Linda Garduno NP      Primary Rehab Impairment Category (ANGIE): Stroke    Impairment Group Label: Right body involvement (left brain)    Etiologic Diagnosis: Acute Ischemic Stroke (acute/early subacute infarct at the left posterior basal ganglia to corona radiata) with residual right hemiparesis, dysphagia and dysarthria      Subjective:     Patient seen and examined. History of the Present Illness: The patient is a 69-year-old, left-handed, White, obese male with multiple medical comorbidities who was admitted to [facility] on 8/12/2019 due to right leg weakness, ataxia, slurred speech and right sided facial droop. The patient was apparently well until the day prior to admission, [place], the patient was noted to have slurred speech. After ~15 minutes, the slurred speech resolved. Patient did not seek any medical attention. The evening prior to admission, the patient proceeded to a scheduled sleep study. The patient apparently was not able to sleep so the sleep study was terminated and he got home at ~4:00 AM on the morning of admission. He slept upon getting home and upon waking up at ~7:00 AM, the patient was noted to have slurred speech and weakness of the right arm and right leg resulting in walking towards the right side. The patient's wife brought the patient to the [facility] Emergency Department for further evaluation. The patient was seen and examined by Emergency Medicine (Dr. Noreen Driver). Excerpt (Additional History) from the ED Provider Note by Dr. Noreen Driver: Alissa Alfred Lazarus Seal is a 71 y.o. male with hypertension who presents with slurred speech.   Patient and wife states that patient had slurred speech yesterday afternoon. She thought it was induced from heat. It resolved a couple minutes later. Patient felt better throughout the day. Patient had a sleep study in the hospital.  Sleep study ended at 4 AM.  Patient went home went to sleep woke up at 7 AM.  He states about 10 minutes later he had a second episode of slurred speech and according to the wife, he was walking sideways to the right. Patient denies chest pain, nausea, vomiting or diarrhea. No symptoms at this time. Patient denies smoking alcohol or recreational drug use. Initial blood sugar 174. \"    CT scan of the head (8/12/2019) showed no acute intracranial pathology; no acute hemorrhage; mild chronic small vessel ischemic change is noted. [facility] TeleNeurology (Dr. Jose Youssef) evaluated the patient and intravenous tPA was recommended. The patient was given intravenous tPA. The patient tolerated the procedure well with no intraoperative complications. K (8/12/2019) = 2.4  BUN/Creatinine (8/12/2019) = 26/1.54    CT angiogram of the head and neck (8/12/2019) showed patent intra- and extracranial cerebral arteries; no evidence of high-grade stenosis or acute dissection; there is atherosclerotic disease with mild stenosis of the left MCA at the left trifurcation; other incidental findings. The patient was admitted to the ICU under the service of AREN Moreno (Dr. Peewee Felder). Excerpt (History of the Present Illness) from the H&P by Kaylyn Jeans, PA-C:  \"Patient is a 71 y.o. male with pmhx of HTN who presented to the ED for evaluation of right leg weakness, ataxia, slurred speech and right sided facial droop. Per patient's wife, yesterday around 2 pm while outside at the zoo the patient began experiencing the above stated symptoms. She states that she got him into the car and gave him some water and after approximately 10 minutes his symptoms resolved.  She states that he had a sleep study over night last night and returned home at 4 am this morning. She states that at that time he was asymptomatic. She reports around 7 am when she and the patient woke up and got out of bed he was exhibiting slurred speech, right sided facial droop, ataxia and right leg weakness again and was brought to the ED. Patient evaluated by teleneurology. CT head completed showing no acute intracranial process. Teleneuro recommended tPA which was administered at approximately 0900. Patient was started on a nicardipine gtt for tighter blood pressure control as his SBP >200. CTA head and neck completed w/ formal read pending. Upon arrival to the ICU, patient A&O x3 w/ slurred speech, facial droop and new onset right arm weakness. Patient states that it started approximately an hour PTA to the ICU. On nicardipine gtt. Denies dizziness, lightheadedness, blurred vision, chest pain, abdominal pain, SOB, N/V. Notable labs include K+ of 2.4, Cr of 1.54, and troponin of 0.04.\"    SCD's were used for DVT prophylaxis. MRI of the brain (8/12/2019) showed a small region of acute/early subacute infarct at the left posterior basal ganglia to corona radiata; no hemorrhage or mass effect. Neurology consult (Dr. Carmine Stoddard) was called for evaluation and comanagement of the stroke.    K (8/12/2019, 11:28 PM) = 2.4    Lipid profile (8/13/2019) showed , , HDL 42,     HbA1c (8/13/2019) = 6.6    Repeat CT scan of the head (8/13/2019) showed a 2.5 x 1.6 cm infarct in the left basal ganglia; no acute intracranial hemorrhage; no significant mass effect or midline shift.    K (8/13/2019) = 2.8  BUN/Creatinine (8/13/2019) = 26/1.32    On 8/13/2019, patient was transferred out of the ICU and service was transferred to the [facility] (Dr. Marleny Moreno). On 8/13/2019, unfractionated heparin was started for DVT prophylaxis.  Patient was given a total of 60 meq of Potassium chloride IV and 40 meq of Potassium chloride PO. Patient was resumed on Amlodipine 10 mg PO once daily, Metoprolol succinate 50 mg PO once daily and Gemfibrozil 600 mg PO BID (home medications). As per note of Neurology (Dr. Wellington Mcfadden), patient refuses statin agent because of potential side effects.     K (8/14/2019, 3:42 AM) = 2.7  BUN/Creatinine (8/14/2019, 3:42 AM) = 28/1.39    Lipid profile (8/14/2019) showed , , HDL 39, LDL 94    On 8/14/2019, the patient was started on Aspirin 325 mg PO once daily. As per the note of Александр Mcgowan NP, patient was agreeable to start a statin. Gemfibrozil was discontinued and Atorvastatin 80 mg PO q HS was started by Dr. Fern Shankar. Hydralazine 50 mg PO q 8 hr was added for BP control. Patient received a total of 70 meq of Potassium chloride IV. Modified barium swallow (8/14/2019) showed silent tracheal aspiration of thin liquids; laryngeal penetration of nectar consistency; no drew penetration or aspiration with other tested consistencies. Patient was started on a mechanical soft diet with nectar-thick liquids. K (8/14/2019, 6:48 PM) = 2.8  BUN/Creatinine (8/14/2019, 6:48 PM) = 26/1.31    K (8/15/2019) = 3.0  BUN/Creatinine (8/15/2019) = 23/1.12    On the morning of 8/15/2019, the patient was noted to have a nosebleed. Neurology had seen the patient had he recommended to decrease the Aspirin dose from 325 mg to 81 mg PO once daily. Patient received 40 meq of Potassium chloride PO and 10 meq of Potassium chloride IV. The patient had remained hemodynamically stable but due to the abovementioned neurologic deficit, the patient was noted to have impaired mobility and ADLs. Patient was felt to be a good candidate for acute inpatient rehabilitation. Upon evaluation by Physical Therapy and Occupational Therapy, the patient was recommended for acute inpatient rehabilitation.      Serum potassium had remained low during the entire hospital stay (ranging from 2.4 to 3.0) despite supplementation. Lab results in [place] shows that prior to admission to [facility], the patient has had a chronically low potassium (since !) and difficult to control hypertension. Patient and his wife had said that the patient has had high blood pressures for more than 23 years and that he always has muscle cramps in his legs. Patient and wife stated that no matter what BP medication was prescribed, his blood pressure was difficult to control and did not respond. Patient and wife had always been mentioning the elevated blood pressure every time they visit their PCP. The wife stated the patient had been dealing with low potassium for a long time. On 2019, the patient changed PCP from Ayden Hernandez NP to Isrrael Moreno NP. Upon visit to his PCP Isrrael Moreno NP) on 2019, a renal ultrasound was ordered to rule out renal artery stenosis. Duplex ultrasound of the renal arteries and veins done 2019 showed no evidence for hemodynamically significant (>60%) stenosis identified in either renal artery; elevated resistive indices present in both kidneys, consistent with parenchymal disease; bilateral patent renal veins. Due to the chronically low potassium and elevated blood pressure, I called [facility] to discuss the possibility of Primary hyperaldosteronism as the etiology of the persistently low potassium (despite correction) and uncontrolled hypertension (most likely secondary). She discussed this with her attending physician (Dr. Gabriel Ortega) and they ordered an Endocrinology consult (Dr. Heike Mcdowell) for evaluation and management. Patient was cleared for discharge/transfer to inpatient rehabilitation. The patient was discharged and was subsequently admitted to the [facility] for [facility] for intensive rehabilitation to help recover strength, function and mobility.       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     On Allopurinol    Chronic hypokalemia     Persistent chronic hypokalemia + hypertension; ?primary hyperaldosteronism    CKD (chronic kidney disease) stage 2, GFR 60-89 ml/min 8/15/2019    Current use of aspirin 8/14/2019    Dysarthria 8/12/2019    Dysphagia 8/12/2019    Elevated prostate specific antigen (PSA) 7/21/2011    First degree atrioventricular block by electrocardiogram 8/12/2019    Hypertensive heart and kidney disease without heart failure and with stage 2 chronic kidney disease     Hypertensive heart disease without heart failure     Obesity, Class I, BMI 30-34.9     Obstructive sleep apnea on CPAP     On statin therapy due to risk of future cardiovascular event 8/14/2019    On Atorvastatin    Pure hypercholesterolemia 08/15/2019    Lipid profile (8/13/2019) showed , , HDL 42, ; Lipid profile (8/14/2019) showed , , HDL 39, LDL 94    Received intravenous tissue plasminogen activator (tPA) in emergency department 8/12/2019    Refusal of statin medication by patient 8/13/2019    As per note of Neurology (Dr. Carmine Stoddard), patient refuses statin agent because of potential side effects.  [facility] 8/12/2019    Type 2 diabetes mellitus with stage 2 chronic kidney disease (HCC)     HbA1c (8/13/2019) = 6.6       Past Surgical History:  Past Surgical History:   Procedure Laterality Date    COLONOSCOPY N/A 4/15/2019    COLONOSCOPY performed by Dante Banks MD at [facility] ENDOSCOPY    HX TONSILLECTOMY         Allergies:  No Known Allergies      Social History: The patient is , lives with his wife in a 1-story house with a 2-step entry in [place], [place].  Patient has a remote history of cigarette smoking (quit in ). He drinks a beer every now and then (\"a 6-pack lasts the whole year\"). He denied any illicit drug use. He works part-time for the [employer]. 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 Family History: Both parents are . Family History   Problem Relation Age of Onset    Cancer Mother         Gastric cancer    Heart Disease Father     Prostate Cancer Father        Home Medications (medications taken at home prior to admission to [facility]): Outpatient Medications Marked as Taking for the 19 encounter (Office Visit) with Mulugeta Carney NP   Medication Sig Dispense Refill    allopurinol (ZYLOPRIM) 300 mg PO TABS TAKE 1 TABLET DAILY 90 Tab 3    amLODIPine (NORVASC) 10 mg PO TABS Take 1 Tab by Mouth Once a Day. 90 Tab 3    gemfibrozil (LOPID) 600 mg PO TABS Take 1 Tab by Mouth Twice Daily. 180 Tab 3    hydrALAZINE (APRESOLINE) 25 mg PO TABS Take 1 Tab by Mouth Every 8 Hours. 270 Tab 3    losartan-hydroCHLOROthiazide (HYZAAR) 100-25 mg PO TABS Take 1 Tab by Mouth Once a Day. 90 Tab 3    metoprolol XL (TOPROL XL) 50 mg PO TB24 Take 1 Tab by Mouth Once a Day. 90 Tab 3     potassium chloride ER (K-DUR;KLOR-CON) 10 mEq PO TbTQ Take 1 Tab by Mouth Once a Day. Transfer Medications (from the transfer summary prepared by Milagros Quinonez NP):    Prior to Admission Medications   Prescriptions Last Dose Informant Patient Reported? Taking? ALLOPURINOL PO   Yes No   Sig: Take 300 mg by mouth daily. acetaminophen (TYLENOL) 325 mg tablet   No No   Sig: Take 2 Tabs by mouth every six (6) hours as needed for Pain. amLODIPine (NORVASC) 10 mg tablet   Yes No   Sig: Take  by mouth daily. aspirin delayed-release 81 mg tablet   No No   Sig: Take 1 Tab by mouth daily for 30 days. atorvastatin (LIPITOR) 80 mg tablet   No No   Sig: Take 1 Tab by mouth nightly.  Indications: stroke prevention   hydrALAZINE (APRESOLINE) 50 mg tablet   No No   Sig: Take 1 Tab by mouth three (3) times daily. magnesium oxide (MAG-OX) 400 mg tablet   No No   Sig: Take 1 Tab by mouth daily. metoprolol succinate (TOPROL-XL) 50 mg XL tablet   Yes No   Sig: Take  by mouth daily. potassium chloride (KLOR-CON) 20 mEq pack   No No   Sig: Take 2 Packets by mouth two (2) times daily (with meals) for 2 days. Facility-Administered Medications: None       Review Of Systems:   CONSTITUTIONAL: No weight loss. EYES: No blurred vision and no eye discharge. ENT: No nasal discharge. No ear pain. CARDIOVASCULAR: No chest pain and no diaphoresis. RESPIRATORY: No cough, no hemoptysis. GI: No vomiting, no diarrhea   : No urinary frequency and no dysuria. MUSCULOSKELETAL: No muscle pains. SKIN: No rashes. NEURO: As above. ENDOCRINE: No polyphagia and no polydipsia. HEMATOLOGY: No bleeding tendencies. Objective:     Vital Signs:  Patient Vitals for the past 24 hrs:   BP Temp Pulse Resp SpO2   08/15/19 1637 192/90 97.7 °F (36.5 °C) 85 18 97 %        There is no height or weight on file to calculate BMI. Physical Examination:  GENERAL SURVEY: Patient is awake, alert, oriented x 3, sitting comfortably on the chair, not in acute respiratory distress. HEENT: pink palpebral conjunctivae, anicteric sclerae, no nasoaural discharge, moist oral mucosa  NECK: supple, no jugular venous distention, no palpable lymph nodes  CHEST/LUNGS: symmetrical chest expansion, good air entry, clear breath sounds  HEART: adynamic precordium, good S1 S2, no S3, regular rhythm, no murmurs  ABDOMEN: obese, bowel sounds appreciated, soft, non-tender  EXTREMITIES: pink nailbeds, no edema, full and equal pulses, no calf tenderness   NEUROLOGICAL EXAM: The patient is awake, alert and oriented x3, able to answer questions fairly appropriately, able to follow 1 and 2 step commands. Able to tell time from the wall clock.   Cranial nerves II-XII are grossly intact except for slightly shallow right nasolabial fold, dysarthria and dysphagia. No gross sensory deficit. Motor strength is 5/5 on the LUE and LLE, 0/5 on the RUE (except for 1/5 on the right shoulder), 2 to 2+/5 on RLE (except for 0/5 on the right ankle/foot).       Current Medications:  Current Facility-Administered Medications   Medication Dose Route Frequency    acetaminophen (TYLENOL) tablet 650 mg  650 mg Oral Q4H PRN    bisacodyl (DULCOLAX) tablet 10 mg  10 mg Oral Q48H PRN    heparin (porcine) injection 5,000 Units  5,000 Units SubCUTAneous Q8H    insulin lispro (HUMALOG) injection   SubCUTAneous TIDAC    [START ON 8/16/2019] magnesium oxide (MAG-OX) tablet 400 mg  400 mg Oral DAILY    potassium chloride (KLOR-CON) packet for solution 40 mEq  40 mEq Oral BID WITH MEALS    [START ON 8/16/2019] aspirin chewable tablet 81 mg  81 mg Oral DAILY WITH BREAKFAST    [START ON 8/16/2019] amLODIPine (NORVASC) tablet 10 mg  10 mg Oral DAILY    [START ON 8/16/2019] metoprolol succinate (TOPROL-XL) XL tablet 50 mg  50 mg Oral DAILY    atorvastatin (LIPITOR) tablet 80 mg  80 mg Oral QHS    hydrALAZINE (APRESOLINE) tablet 75 mg  75 mg Oral Q8H       Functional Assessment:     Occupational Therapy   Prior Level of Function  Pre-Admission Screen    Eating   Independent Eating   Supervision   Grooming   Independent Grooming   Moderate Assist   Upper Body Dressing   Independent Upper Body Dressing   Moderate Assist   Lower Body Dressing   Independent Lower Body Dressing   Dependent   Bladder Management   Independent Bladder Management   Maximal Assist   Bowel Management   Independent Bowel Management   Maximal Assist     Physical Therapy   Prior Level of Function  Pre-Admission Screen    Ambulation   Independent Ambulation   Not evaluated   Bed Mobility   Independent Bed Mobility   Maximal Assist   Supine to Sit   Independent Supine to Sit   Dependent   Sit to Stand   Independent Sit to Stand   Dependent Bed/Chair Transfers   Independent Bed/Chair Transfers   Not evaluated   Toilet Transfers   Independent Toilet Transfers   Not evaluated       Assessment:     Primary Rehabilitation Diagnosis  1. Impaired Mobility and ADLs  2. Acute Ischemic Stroke (acute/early subacute infarct at the left posterior basal ganglia to corona radiata) with residual right hemiparesis, dysphagia and dysarthria    Comorbidities   Obesity, Class I, BMI 30-34.9 E66.9    Acute ischemic stroke (HCC) I63.9    Obstructive sleep apnea on CPAP G47.33, Z99.89    Hypertensive heart and kidney disease without heart failure and with stage 2 chronic kidney disease I13.10, N18.2    Chronic hypokalemia E87.6    CKD (chronic kidney disease) stage 2, GFR 60-89 ml/min N18.2    Current use of aspirin Z79.82    On statin therapy due to risk of future cardiovascular event Z79.899    Type 2 diabetes mellitus with stage 2 chronic kidney disease  E11.22, N18.2    Pure hypercholesterolemia E78.00    Elevated prostate specific antigen (PSA) R97.20    Refusal of statin medication by patient (rescinded) Z53.29    First degree atrioventricular block by electrocardiogram I44.0    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 20X0       Willingness to participate in the program: Good      Rehabilitation Potential: Good      Plan:     1. Medical Issues being followed closely:    [x]  Fall and safety precautions     []  Wound Care     [x]  Bowel and Bladder Function     [x]  Fluid Electrolyte and Nutrition Balance     []  Pain Control      2. Issues that 24 hour rehabilitation nursing is following:    [x]  Fall and safety precautions     []  Wound Care     [x]  Bowel and Bladder Function     [x]  Fluid Electrolyte and Nutrition Balance     []  Pain Control      [x]  Assistance with and education on in-room safety with transfers to and from the bed, wheelchair, toilet and shower.       3. Acute rehabilitation plan of care:    [x]  Patient to be evaluated and treated by:           [x]  Physical Therapy           [x]  Occupational Therapy           [x]  Speech Therapy     []  Hold Rehab until further notice     5. Medications:    [x]  MAR Reviewed     [x]  Continue Present Medications     6. DVT Prophylaxis:      []  Lovenox     [x]  Unfractionated Heparin     []  Coumadin     []  NOAC     []  SUZAN Stockings     []  Sequential Compression Device     []  None     7. Rehabilitation program and expectations from patient, as well as medical issues discussed with the patient and his wife. MEDICAL PLAN:  > Acute Ischemic Stroke (acute/early subacute infarct at the left posterior basal ganglia to corona radiata) with residual right hemiparesis, dysphagia and dysarthria; S/P Administration of intravenous tPA (8/12/2019 - [facility])   > Aspirin 81 mg PO once daily with breakfast   > Atorvastatin 80 mg PO q HS    > Chronic hypokalemia    Serum Potassium during hospital stay at [facility]      08/15/19  0330 08/14/19  1848 08/14/19  0342 08/13/19  1205 08/12/19  2328   K 3.0* 2.8* 2.7* 2.8* 2.4*          > Klor Con 40 meq PO BID with meals x 2 days   > K in AM    > Hypertensive heart and kidney disease without heart failure and with stage 2 chronic kidney disease   > Amlodipine 10 mg PO once daily (9AM)   > Increase Hydralazine from 50 mg to 75 mg PO q 8 hr (6AM, 2PM, 10PM)   > Metoprolol succinate 50 mg PO once daily (6AM)   > Once work-up for primary hyperaldosteronism is complete, plan to start Spironolactone 25 mg PO once daily    > Pure hypercholesterolemia   > Lipid profile (8/13/2019) showed , , HDL 42,    > Lipid profile (8/14/2019) showed , , HDL 39, LDL 94   > Atorvastatin 80 mg PO q HS    > Type 2 diabetes mellitus with stage 2 chronic kidney disease   > HbA1c (8/13/2019) = 6.6   > Humalog insulin sliding scale SC TID AC    >  ? Primary hyperaldosteronism as etiology of chronic hypokalemia and (secondary) hypertension   > Endocrinology consult (Dr. Faiza Villasenor) was called at [facility] prior to discharge/transfer to the ARU   > Aldosterone/Renin activity in AM   > Once work-up for primary hyperaldosteronism is complete, plan to start Spironolactone 25 mg PO once daily    > Diet:   > Specifications: Diabetic, low saturated fat   > Solids (consistency): Mechanical soft (NDD 2)    > Liquids (consistency): Mildly thick (Nectar-thick)       Signed:    Hilary Ortega MD    August 15, 2019

## 2019-08-15 NOTE — PROGRESS NOTES
ARU/IPR REFERRAL CONTACT NOTE  [id number] [place] [address] for Physical Rehabilitation          RE:  Doc Wesley     Thank you for the opportunity to review this patient's case for admission to [address] [place] [address] for Physical Rehabilitation. Based on our pre-admission screening patient meets criteria for admission to [facility] for Physical Rehabilitation. Plan for admission today to room 179 at 1pm.  Will need d/c summary/med rec completed and report called to [phone] prior to patient's arrival.    Again, Thank you for this referral. Should you have any questions please do not hesitate to call. Sincerely,  Jackie Buchanan.  Keanu Rogers [facility] for Physical Rehabilitation  [phone]

## 2019-08-15 NOTE — PROGRESS NOTES
Problem: Pressure Injury - Risk of  Goal: *Prevention of pressure injury  Description  Document Bartolome Scale and appropriate interventions in the flowsheet. Outcome: Progressing Towards Goal  Note:   Pressure Injury Interventions:     Frequent repositioning. Pressure offset on right side, due to weakness. Activity Interventions: PT/OT evaluation    Mobility Interventions: HOB 30 degrees or less    Nutrition Interventions: Document food/fluid/supplement intake    Friction and Shear Interventions: Foam dressings/transparent film/skin sealants                Problem: Patient Education: Go to Patient Education Activity  Goal: Patient/Family Education  Outcome: Progressing Towards Goal     Problem: Falls - Risk of  Goal: *Absence of Falls  Description  Document Valentín Dash Fall Risk and appropriate interventions in the flowsheet. Outcome: Progressing Towards Goal  Note:   Fall Risk Interventions:  Mobility Interventions: Communicate number of staff needed for ambulation/transfer         Medication Interventions: Assess postural VS orthostatic hypotension    Elimination Interventions: Call light in reach, Urinal in reach              Problem: Patient Education: Go to Patient Education Activity  Goal: Patient/Family Education  Outcome: Progressing Towards Goal     Problem: Patient Education: Go to Patient Education Activity  Goal: Patient/Family Education  Outcome: Progressing Towards Goal     Problem: TIA/CVA Stroke: 0-24 hours  Goal: *Absence of bleeding  Outcome: Progressing Towards Goal     Problem: Ischemic Stroke:  3-24 hours  Goal: *Initiate mobility  Outcome: Progressing Towards Goal  Goal: *Absence of aspiration  Outcome: Progressing Towards Goal     Problem: Diabetes Self-Management  Goal: *Disease process and treatment process  Description  Define diabetes and identify own type of diabetes; list 3 options for treating diabetes.   Outcome: Progressing Towards Goal  Goal: *Incorporating nutritional management into lifestyle  Description  Describe effect of type, amount and timing of food on blood glucose; list 3 methods for planning meals. Outcome: Progressing Towards Goal  Goal: *Incorporating physical activity into lifestyle  Description  State effect of exercise on blood glucose levels. Outcome: Progressing Towards Goal  Goal: *Developing strategies to promote health/change behavior  Description  Define the ABC's of diabetes; identify appropriate screenings, schedule and personal plan for screenings. Outcome: Progressing Towards Goal  Goal: *Using medications safely  Description  State effect of diabetes medications on diabetes; name diabetes medication taking, action and side effects. Outcome: Progressing Towards Goal  Goal: *Monitoring blood glucose, interpreting and using results  Description  Identify recommended blood glucose targets  and personal targets. Outcome: Progressing Towards Goal  Goal: *Prevention, detection, treatment of acute complications  Description  List symptoms of hyper- and hypoglycemia; describe how to treat low blood sugar and actions for lowering  high blood glucose level. Outcome: Progressing Towards Goal  Goal: *Prevention, detection and treatment of chronic complications  Description  Define the natural course of diabetes and describe the relationship of blood glucose levels to long term complications of diabetes.   Outcome: Progressing Towards Goal  Goal: *Developing strategies to address psychosocial issues  Description  Describe feelings about living with diabetes; identify support needed and support network  Outcome: Progressing Towards Goal  Goal: *Sick day guidelines  Outcome: Progressing Towards Goal  Goal: *Patient Specific Goal (EDIT GOAL, INSERT TEXT)  Outcome: Progressing Towards Goal     Problem: Patient Education: Go to Patient Education Activity  Goal: Patient/Family Education  Outcome: Progressing Towards Goal     Problem: Patient Education: Go to Patient Education Activity  Goal: Patient/Family Education  Outcome: Progressing Towards Goal     Problem: Patient Education: Go to Patient Education Activity  Goal: Patient/Family Education  Outcome: Progressing Towards Goal     Problem: Patient Education: Go to Patient Education Activity  Goal: Patient/Family Education  Outcome: Progressing Towards Goal

## 2019-08-16 ENCOUNTER — APPOINTMENT (OUTPATIENT)
Dept: GENERAL RADIOLOGY | Age: 64
DRG: 057 | End: 2019-08-16
Attending: INTERNAL MEDICINE
Payer: MEDICARE

## 2019-08-16 PROBLEM — D72.829 LEUKOCYTOSIS: Status: ACTIVE | Noted: 2019-08-16

## 2019-08-16 LAB
ALBUMIN SERPL-MCNC: 3.2 G/DL (ref 3.4–5)
ALBUMIN/GLOB SERPL: 1 {RATIO} (ref 0.8–1.7)
ALP SERPL-CCNC: 188 U/L (ref 45–117)
ALT SERPL-CCNC: 15 U/L (ref 16–61)
ANION GAP SERPL CALC-SCNC: 10 MMOL/L (ref 3–18)
APPEARANCE UR: CLEAR
AST SERPL-CCNC: 14 U/L (ref 10–38)
BACTERIA URNS QL MICRO: ABNORMAL /HPF
BASOPHILS # BLD: 0 K/UL (ref 0–0.06)
BASOPHILS NFR BLD: 0 % (ref 0–3)
BILIRUB DIRECT SERPL-MCNC: 0.2 MG/DL (ref 0–0.2)
BILIRUB SERPL-MCNC: 0.8 MG/DL (ref 0.2–1)
BILIRUB UR QL: NEGATIVE
BUN SERPL-MCNC: 26 MG/DL (ref 7–18)
BUN/CREAT SERPL: 22 (ref 12–20)
CALCIUM SERPL-MCNC: 8.3 MG/DL (ref 8.5–10.1)
CHLORIDE SERPL-SCNC: 107 MMOL/L (ref 100–111)
CO2 SERPL-SCNC: 25 MMOL/L (ref 21–32)
COLOR UR: YELLOW
CREAT SERPL-MCNC: 1.2 MG/DL (ref 0.6–1.3)
DIFFERENTIAL METHOD BLD: ABNORMAL
EOSINOPHIL # BLD: 0.4 K/UL (ref 0–0.4)
EOSINOPHIL NFR BLD: 2 % (ref 0–5)
EPITH CASTS URNS QL MICRO: ABNORMAL /LPF (ref 0–5)
ERYTHROCYTE [DISTWIDTH] IN BLOOD BY AUTOMATED COUNT: 14.4 % (ref 11.6–14.5)
GLOBULIN SER CALC-MCNC: 3.3 G/DL (ref 2–4)
GLUCOSE BLD STRIP.AUTO-MCNC: 119 MG/DL (ref 70–110)
GLUCOSE BLD STRIP.AUTO-MCNC: 132 MG/DL (ref 70–110)
GLUCOSE BLD STRIP.AUTO-MCNC: 157 MG/DL (ref 70–110)
GLUCOSE BLD STRIP.AUTO-MCNC: 160 MG/DL (ref 70–110)
GLUCOSE SERPL-MCNC: 136 MG/DL (ref 74–99)
GLUCOSE UR STRIP.AUTO-MCNC: 100 MG/DL
GRAN CASTS URNS QL MICRO: ABNORMAL /LPF
HCT VFR BLD AUTO: 32.6 % (ref 36–48)
HGB BLD-MCNC: 10.7 G/DL (ref 13–16)
HGB UR QL STRIP: NEGATIVE
HYALINE CASTS URNS QL MICRO: ABNORMAL /LPF (ref 0–2)
KETONES UR QL STRIP.AUTO: ABNORMAL MG/DL
LEUKOCYTE ESTERASE UR QL STRIP.AUTO: NEGATIVE
LYMPHOCYTES # BLD: 0.7 K/UL (ref 0.8–3.5)
LYMPHOCYTES NFR BLD: 4 % (ref 20–51)
MAGNESIUM SERPL-MCNC: 1.9 MG/DL (ref 1.6–2.6)
MCH RBC QN AUTO: 27.7 PG (ref 24–34)
MCHC RBC AUTO-ENTMCNC: 32.8 G/DL (ref 31–37)
MCV RBC AUTO: 84.5 FL (ref 74–97)
MONOCYTES # BLD: 0.5 K/UL (ref 0–1)
MONOCYTES NFR BLD: 3 % (ref 2–9)
MUCOUS THREADS URNS QL MICRO: ABNORMAL /LPF
NEUTS SEG # BLD: 16.3 K/UL (ref 1.8–8)
NEUTS SEG NFR BLD: 91 % (ref 42–75)
NITRITE UR QL STRIP.AUTO: NEGATIVE
PH UR STRIP: 5 [PH] (ref 5–8)
PLATELET # BLD AUTO: 305 K/UL (ref 135–420)
PLATELET COMMENTS,PCOM: ABNORMAL
PMV BLD AUTO: 11.3 FL (ref 9.2–11.8)
POTASSIUM SERPL-SCNC: 3.8 MMOL/L (ref 3.5–5.5)
PROT SERPL-MCNC: 6.5 G/DL (ref 6.4–8.2)
PROT UR STRIP-MCNC: >1000 MG/DL
RBC # BLD AUTO: 3.86 M/UL (ref 4.7–5.5)
RBC #/AREA URNS HPF: ABNORMAL /HPF (ref 0–5)
RBC MORPH BLD: ABNORMAL
SODIUM SERPL-SCNC: 142 MMOL/L (ref 136–145)
SP GR UR REFRACTOMETRY: >1.03 (ref 1–1.03)
URATE SERPL-MCNC: 6.7 MG/DL (ref 2.6–7.2)
UROBILINOGEN UR QL STRIP.AUTO: 1 EU/DL (ref 0.2–1)
WBC # BLD AUTO: 17.9 K/UL (ref 4.6–13.2)
WBC URNS QL MICRO: ABNORMAL /HPF (ref 0–4)

## 2019-08-16 PROCEDURE — 97535 SELF CARE MNGMENT TRAINING: CPT

## 2019-08-16 PROCEDURE — 84550 ASSAY OF BLOOD/URIC ACID: CPT

## 2019-08-16 PROCEDURE — 80048 BASIC METABOLIC PNL TOTAL CA: CPT

## 2019-08-16 PROCEDURE — 83735 ASSAY OF MAGNESIUM: CPT

## 2019-08-16 PROCEDURE — 97530 THERAPEUTIC ACTIVITIES: CPT

## 2019-08-16 PROCEDURE — 80076 HEPATIC FUNCTION PANEL: CPT

## 2019-08-16 PROCEDURE — 36415 COLL VENOUS BLD VENIPUNCTURE: CPT

## 2019-08-16 PROCEDURE — 92522 EVALUATE SPEECH PRODUCTION: CPT

## 2019-08-16 PROCEDURE — 71046 X-RAY EXAM CHEST 2 VIEWS: CPT

## 2019-08-16 PROCEDURE — 96125 COGNITIVE TEST BY HC PRO: CPT

## 2019-08-16 PROCEDURE — 82962 GLUCOSE BLOOD TEST: CPT

## 2019-08-16 PROCEDURE — 97542 WHEELCHAIR MNGMENT TRAINING: CPT

## 2019-08-16 PROCEDURE — 97166 OT EVAL MOD COMPLEX 45 MIN: CPT

## 2019-08-16 PROCEDURE — 96105 ASSESSMENT OF APHASIA: CPT

## 2019-08-16 PROCEDURE — 97112 NEUROMUSCULAR REEDUCATION: CPT

## 2019-08-16 PROCEDURE — BW03ZZZ PLAIN RADIOGRAPHY OF CHEST: ICD-10-PCS | Performed by: INTERNAL MEDICINE

## 2019-08-16 PROCEDURE — 92610 EVALUATE SWALLOWING FUNCTION: CPT

## 2019-08-16 PROCEDURE — 81001 URINALYSIS AUTO W/SCOPE: CPT

## 2019-08-16 PROCEDURE — 74011636637 HC RX REV CODE- 636/637: Performed by: INTERNAL MEDICINE

## 2019-08-16 PROCEDURE — 65310000000 HC RM PRIVATE REHAB

## 2019-08-16 PROCEDURE — 85025 COMPLETE CBC W/AUTO DIFF WBC: CPT

## 2019-08-16 PROCEDURE — 97162 PT EVAL MOD COMPLEX 30 MIN: CPT

## 2019-08-16 PROCEDURE — 97110 THERAPEUTIC EXERCISES: CPT

## 2019-08-16 PROCEDURE — 74011250636 HC RX REV CODE- 250/636: Performed by: INTERNAL MEDICINE

## 2019-08-16 PROCEDURE — 74011250637 HC RX REV CODE- 250/637: Performed by: INTERNAL MEDICINE

## 2019-08-16 PROCEDURE — 82088 ASSAY OF ALDOSTERONE: CPT

## 2019-08-16 RX ORDER — LABETALOL 100 MG/1
200 TABLET, FILM COATED ORAL EVERY 12 HOURS
Status: DISCONTINUED | OUTPATIENT
Start: 2019-08-16 | End: 2019-08-19

## 2019-08-16 RX ORDER — LABETALOL 100 MG/1
100 TABLET, FILM COATED ORAL
Status: COMPLETED | OUTPATIENT
Start: 2019-08-16 | End: 2019-08-16

## 2019-08-16 RX ORDER — LOSARTAN POTASSIUM AND HYDROCHLOROTHIAZIDE 25; 100 MG/1; MG/1
1 TABLET ORAL DAILY
COMMUNITY
End: 2019-09-05

## 2019-08-16 RX ORDER — ISOSORBIDE DINITRATE 10 MG/1
10 TABLET ORAL EVERY 8 HOURS
Status: DISCONTINUED | OUTPATIENT
Start: 2019-08-16 | End: 2019-08-19

## 2019-08-16 RX ADMIN — METOPROLOL SUCCINATE 50 MG: 50 TABLET, EXTENDED RELEASE ORAL at 05:51

## 2019-08-16 RX ADMIN — POTASSIUM CHLORIDE 40 MEQ: 1.5 POWDER, FOR SOLUTION ORAL at 16:25

## 2019-08-16 RX ADMIN — POTASSIUM CHLORIDE 40 MEQ: 1.5 POWDER, FOR SOLUTION ORAL at 08:26

## 2019-08-16 RX ADMIN — ISOSORBIDE DINITRATE 10 MG: 10 TABLET ORAL at 21:38

## 2019-08-16 RX ADMIN — LABETALOL HYDROCHLORIDE 100 MG: 100 TABLET, FILM COATED ORAL at 12:12

## 2019-08-16 RX ADMIN — HEPARIN SODIUM 5000 UNITS: 5000 INJECTION INTRAVENOUS; SUBCUTANEOUS at 05:51

## 2019-08-16 RX ADMIN — ASPIRIN 81 MG 81 MG: 81 TABLET ORAL at 08:26

## 2019-08-16 RX ADMIN — HEPARIN SODIUM 5000 UNITS: 5000 INJECTION INTRAVENOUS; SUBCUTANEOUS at 13:45

## 2019-08-16 RX ADMIN — HYDRALAZINE HYDROCHLORIDE 75 MG: 50 TABLET, FILM COATED ORAL at 05:51

## 2019-08-16 RX ADMIN — ATORVASTATIN CALCIUM 80 MG: 40 TABLET, FILM COATED ORAL at 21:36

## 2019-08-16 RX ADMIN — AMLODIPINE BESYLATE 10 MG: 10 TABLET ORAL at 08:26

## 2019-08-16 RX ADMIN — ISOSORBIDE DINITRATE 10 MG: 10 TABLET ORAL at 13:45

## 2019-08-16 RX ADMIN — HYDRALAZINE HYDROCHLORIDE 75 MG: 50 TABLET, FILM COATED ORAL at 21:37

## 2019-08-16 RX ADMIN — HEPARIN SODIUM 5000 UNITS: 5000 INJECTION INTRAVENOUS; SUBCUTANEOUS at 21:38

## 2019-08-16 RX ADMIN — INSULIN LISPRO 2 UNITS: 100 INJECTION, SOLUTION INTRAVENOUS; SUBCUTANEOUS at 17:19

## 2019-08-16 RX ADMIN — Medication 400 MG: at 08:26

## 2019-08-16 RX ADMIN — HYDRALAZINE HYDROCHLORIDE 75 MG: 50 TABLET, FILM COATED ORAL at 13:44

## 2019-08-16 RX ADMIN — LABETALOL HYDROCHLORIDE 200 MG: 100 TABLET, FILM COATED ORAL at 21:38

## 2019-08-16 RX ADMIN — INSULIN LISPRO 2 UNITS: 100 INJECTION, SOLUTION INTRAVENOUS; SUBCUTANEOUS at 08:35

## 2019-08-16 NOTE — PROGRESS NOTES
MD notified. No changes from previous assessment. No new orders at this time.  Will continue to monitor pt.     08/16/19 1037   Vital Signs   BP (!) 183/94

## 2019-08-16 NOTE — PROGRESS NOTES
Problem: Mobility Impaired (Adult and Pediatric)  Goal: *Acute Goals and Plan of Care (Insert Text)  Description  Physical Therapy Short Term Goals  Initiated 2019 and to be accomplished within 14 day(s)  1. Patient will move from supine to sit and sit to supine  in bed with minimal assistance/contact guard assist.     2.  Patient will transfer from bed to chair and chair to bed with minimal/moderate assistance using the least restrictive device. 3.  Patient will perform sit to stand with minimal assistance/moderate assistance. 4.  Patient will ambulate with moderate assistance  for 10 feet with the least restrictive device. Physical Therapy Goals  Initiated 2019 and to be accomplished within 4 weeks  1. Patient will move from supine to sit and sit to supine  in bed with supervision/set-up. 2.  Patient will transfer from bed to chair and chair to bed with supervision/set-up using the least restrictive device. 3.  Patient will perform sit to stand with supervision/set-up. 4.  Patient will ambulate with minimal assistance for 50 feet with the least restrictive device. 5.  Patient will ascend/descend 2 stairs with 1 handrail(s) with moderate assistance . 2019 1510 by Filipe Gold, PT  Outcome: Progressing Towards Goal  2019 1323 by Filipe Gold, PT  Outcome: Progressing Towards Goal   PHYSICAL THERAPY EVALUATION    Patient: Cordelia Campos (37 y.o. male)  Date: 2019  Diagnosis: Acute ischemic stroke [facility]) [I63.9] Acute ischemic stroke [facility])  Precautions:    Chart, physical therapy assessment, plan of care and goals were reviewed. Time In: 1100  Time Out: 1230  Patient Seen For: AM;Balance activities;Gait training;Patient education; Therapeutic exercise;Transfer training; Wheelchair mobility  Pain:  Pt pain was reported as 0 pre-treatment. Pt pain was reported as 0 post-treatment.   Intervention: n/a    Patient identified with name and :yes    SUBJECTIVE:     Patient stated I feel ok.     Patient's Goal for Physical Therapy: \"I want to walk\"    OBJECTIVE DATA SUMMARY:     Past Medical History:   Diagnosis Date    [facility] 8/12/2019    Acute Ischemic Stroke (acute/early subacute infarct at the left posterior basal ganglia to corona radiata) with residual right hemiparesis, dysphagia and dysarthria    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     On Allopurinol    Chronic hypokalemia     Persistent chronic hypokalemia + hypertension; ?primary hyperaldosteronism    CKD (chronic kidney disease) stage 2, GFR 60-89 ml/min 8/15/2019    Current use of aspirin 8/14/2019    Dysarthria 8/12/2019    Dysphagia 8/12/2019    Elevated prostate specific antigen (PSA) 7/21/2011    First degree atrioventricular block by electrocardiogram 8/12/2019    Hypertensive heart and kidney disease without heart failure and with stage 2 chronic kidney disease     Obesity, Class I, BMI 30-34.9     Obstructive sleep apnea on CPAP     On statin therapy due to risk of future cardiovascular event 8/14/2019    On Atorvastatin    Pure hypercholesterolemia 08/15/2019    Lipid profile (8/13/2019) showed , , HDL 42, ; Lipid profile (8/14/2019) showed , , HDL 39, LDL 94    Received intravenous tissue plasminogen activator (tPA) in emergency department 8/12/2019    Refusal of statin medication by patient 8/13/2019    As per note of Neurology (Dr. Nathan Villasenor), patient refuses statin agent because of potential side effects. Right hemiparesis ([facility] 8/12/2019    Type 2 diabetes mellitus with stage 2 chronic kidney disease ([facility]     HbA1c (8/13/2019) = 6.6     Past Surgical History:   Procedure Laterality Date    COLONOSCOPY N/A 4/15/2019    COLONOSCOPY performed by Erik Canela MD at [facility] ENDOSCOPY    HX TONSILLECTOMY         Therapy Diagnosis:   Difficulty with bed mobility  ? Difficulty with functional transfers  ? Difficulty with ambulation  ? Difficulty with stair negotiations  ? Problem List:    Decreased strength B LE  ? Decreased strength trunk/core  ? Decreased AROM   ? Decreased PROM  ? Decreased endurance  ? Decreased balance sitting  ? Decreased balance standing  ? Pain   ? Slow ambulation velocity  ? Decreased coordination  ? Decreased safety awareness  ? Functional Limitations:   Decreased independence with bed mobility  ? Decreased independence with functional transfers  ? Decreased independence with ambulation  ? Decreased independence with stair negotiation  ?        Previous Functional Level: Pt reporting (I) in ADL/IADL w/o use of AD PTA    Home Environment: Home Environment: Private residence  # Steps to Enter: 2  Rails to Enter: No  One/Two Story Residence: One story  Living Alone: No  Support Systems: Spouse/Significant Other/Partner  Patient Expects to be Discharged to[de-identified] Private residence  Current DME Used/Available at Home: None  Tub or Shower Type: Tub/Shower combination    Barriers to Learning/Limitations: None  Compensate with: visual, verbal, tactile, kinesthetic cues/model         Outcome Measures:      MMT Initial Assessment   Right Lower Extremity Left Lower Extremity   Hip Flexion  2/5  4/5   Knee Extension  2-/5  5/5   Knee Flexion  2-/5  5/5   Ankle Dorsiflexion  0/5  5/5   0/5 No palpable muscle contraction  1/5 Palpable muscle contraction, no joint movement  2-/5 Less than full range of motion in gravity eliminated position  2/5 Able to complete full range of motion in gravity eliminated position  2+/5 Able to initiate movement against gravity  3-/5 More than half but not full range of motion against gravity  3/5 Able to complete full range of motion against gravity  3+/5 Completes full range of motion against gravity with minimal resistance  4-/5 Completes full range of motion against gravity with minimal-moderate resistance  4/5 Completes full range of motion against gravity with moderate resistance  4+/5 Completes full range of motion against gravity with moderate-maximum resistance  5/5 Completes full range of motion against gravity with maximum resistance        GROSS ASSESSMENT Initial Assessment 8/16/2019   AROM Grossly decreased, non-functional(right LE, WFL left LE)   Strength Within functional limits(left LE, grossly decreased right LE)   Coordination Grossly decreased, non-functional(right LE)   Tone Abnormal   Sensation  intact   PROM Within functional limits     POSTURE Initial Assessment 8/16/2019   Posture (WDL)     Posture Assessment         BALANCE Initial Assessment    Sitting - Static: Good (unsupported)  Sitting - Dynamic: Fair (occasional)  Standing - Static: (poor+)       FIM SCORES Initial Assessment   Bed/Chair/Wheelchair Transfers Transfer Type: (stand step without assistive device)  Transfer Assistance : 2 (Maximal assistance)  Sit to Stand Assistance: Maximum assistance   Wheelchair Mobility Able to Propel (ft): 300 feet  Functional Level: 5  Wheelchair Setup Assist Required : 3 (Moderate assistance)(for right brake and leg rest)  Wheelchair Management: Manages left brake   [place]alking [place] 2 (Maximal assistance)   Steps/Stairs  NT   PRIMARY MODE OF LOCOMOTION: w/c  Please see [facility] Interdisciplinary Eval: Coordination/Balance Section for details regarding FIM score description.     BED/CHAIR/WHEELCHAIR TRANSFERS Initial Assessment   Rolling Right 6 (Modified independent)   Rolling Left 3 (Moderate assistance )   Supine to Sit 3 (Moderate assistance)   Sit to Stand Maximum assistance   Sit to Supine 3 (Moderate assistance)   Transfer Assist Score Transfer Type: (stand step without assistive device)  Transfer Assistance : 2 (Maximal assistance)  Sit to Stand Assistance: Maximum assistance   Transfer Type (stand step without assistive device)   Comments     Car Transfer     Car Type         WHEELCHAIR MOBILITY/MANAGEMENT Initial Assessment   Able to Propel 300 feet   Functional Level 5   Curbs/ramps assistance required  NT   Wheelchair set up assistance required 3 (Moderate assistance)(for right brake and leg rest)   Wheelchair management Manages left brake       WALKING INDEPENDENCE Initial Assessment   Assistive device Cane, quad   Ambulation assistance - level surface 2 (Maximal assistance)   Distance 3 Feet (ft)   Functional Level 1   Comments (3 feet max A with wide base quad cane)   Ambulation assistance - unlevel surface  NT       GAIT Initial Assessment 8/16/2019   Gait Description (WDL)     Gait Abnormalities Hemiplegic;Decreased step clearance; Step to gait: assistance needed to prevent right knee from buckling and to progress leg when stepping       STEPS/STAIRS Initial Assessment   Steps/Stairs ambulated     Rail Use     Functional Level 0   Comments     Curbs/Ramps         Therapeutic Exercises:   Extremity: Right  Exercise Type #1: Supine lower extremity strengthening  Sets Performed: 1  Reps Performed: 10  Level of Assist: Moderate assistance(tactile and verbal cues)      Neuromuscular re-education:   Standing weight shifting activities with WBQC and min A at right knee to prevent knee buckling and verbal cues to prevent right knee hyperextension      ASSESSMENT :  Based on the objective data described above, the patient presents with decreased strength, balance, coordination and activity tolerance resulting in decreased independence with functional mobility. Pt responds well to verbal cues during transfers and ambulation. Patient will benefit from skilled intervention to address the above impairments. Patients rehabilitation potential is considered to be Good  Factors which may influence rehabilitation potential include:   ? None noted  ? Mental ability/status  ? Medical condition  ? Home/family situation and support systems  ? Safety awareness  ? Pain tolerance/management  ?          Other: PLAN :      Order received from MD for physical therapy services and chart reviewed. Pt to be seen 5 times per week for 3 hours of total therapy per day for 4 weeks. Thank you for the referral.    Pt would benefit from skilled physical therapy in order to improve independent functional mobility within the home. Interventions may include range of motion (AROM, PROM B LE/trunk), motor function (B LE/trunk strengthening/coordination), activity tolerance (vitals, oxygen saturation levels), bed mobility training, balance activities, gait training (progressive ambulation program), wheelchair management and functional transfer training. Discharge Recommendations: Home Health  Further Equipment Recommendations for Discharge: TBD        Activity Tolerance:   fair  Please refer to the flowsheet for vital signs taken during this treatment. After treatment:   Patient left sitting in w/c in dining room with wife. COMMUNICATION/EDUCATION:   ?         Fall prevention education was provided and the patient/caregiver indicated understanding. ? Patient/family have participated as able in goal setting and plan of care. ?         Patient/family agree to work toward stated goals and plan of care. ?         Patient understands intent and goals of therapy, but is neutral about his/her participation. ? Patient is unable to participate in goal setting and plan of care.     Thank you for this referral.  Dione Perez, PT  8/16/2019

## 2019-08-16 NOTE — PROGRESS NOTES
Problem: Diabetes Self-Management  Goal: *Disease process and treatment process  Description  Define diabetes and identify own type of diabetes; list 3 options for treating diabetes. Outcome: Progressing Towards Goal  Goal: *Incorporating nutritional management into lifestyle  Description  Describe effect of type, amount and timing of food on blood glucose; list 3 methods for planning meals. Outcome: Progressing Towards Goal  Goal: *Incorporating physical activity into lifestyle  Description  State effect of exercise on blood glucose levels. Outcome: Progressing Towards Goal  Goal: *Developing strategies to promote health/change behavior  Description  Define the ABC's of diabetes; identify appropriate screenings, schedule and personal plan for screenings. Outcome: Progressing Towards Goal  Goal: *Using medications safely  Description  State effect of diabetes medications on diabetes; name diabetes medication taking, action and side effects. Outcome: Progressing Towards Goal  Goal: *Monitoring blood glucose, interpreting and using results  Description  Identify recommended blood glucose targets  and personal targets. Outcome: Progressing Towards Goal  Goal: *Prevention, detection, treatment of acute complications  Description  List symptoms of hyper- and hypoglycemia; describe how to treat low blood sugar and actions for lowering  high blood glucose level. Outcome: Progressing Towards Goal  Goal: *Prevention, detection and treatment of chronic complications  Description  Define the natural course of diabetes and describe the relationship of blood glucose levels to long term complications of diabetes.   Outcome: Progressing Towards Goal  Goal: *Developing strategies to address psychosocial issues  Description  Describe feelings about living with diabetes; identify support needed and support network  Outcome: Progressing Towards Goal  Goal: *Insulin pump training  Outcome: Progressing Towards Goal  Goal: *Sick day guidelines  Outcome: Progressing Towards Goal  Goal: *Patient Specific Goal (EDIT GOAL, INSERT TEXT)  Outcome: Progressing Towards Goal

## 2019-08-16 NOTE — H&P
[address]  [address] [place] [address], [address]     INPATIENT REHABILITATION  HISTORY AND PHYSICAL  (Post Admission Physician Evaluation)    Name: Darby Welch CSN: [id number]   Age: 71 y.o. MRN: [id number]   Sex: male Admit Date: 8/15/2019     PCP: Jet Mack NP      Primary Rehab Impairment Category (ANGIE): Stroke    Impairment Group Label: Right body involvement (left brain)    Etiologic Diagnosis: Acute Ischemic Stroke (acute/early subacute infarct at the left posterior basal ganglia to corona radiata) with residual right hemiparesis, dysphagia and dysarthria      Subjective:     Patient seen and examined. History of the Present Illness: The patient is a 43-year-old, left-handed, White, obese male with multiple medical comorbidities who was admitted to [facility] on 8/12/2019 due to right leg weakness, ataxia, slurred speech and right sided facial droop. The patient was apparently well until the day prior to admission, [place], the patient was noted to have slurred speech. After ~15 minutes, the slurred speech resolved. Patient did not seek any medical attention. The evening prior to admission, the patient proceeded to a scheduled sleep study. The patient apparently was not able to sleep so the sleep study was terminated and he got home at ~4:00 AM on the morning of admission. He slept upon getting home and upon waking up at ~7:00 AM, the patient was noted to have slurred speech and weakness of the right arm and right leg resulting in walking towards the right side. The patient's wife brought the patient to the [facility] Emergency Department for further evaluation. The patient was seen and examined by Emergency Medicine (Dr. Alta Melara). Excerpt (Additional History) from the ED Provider Note by Dr. Alta Melara: Jimbo Liang Mc is a 71 y.o. male with hypertension who presents with slurred speech. Patient and wife states that patient had slurred speech yesterday afternoon. She thought it was induced from heat. It resolved a couple minutes later. Patient felt better throughout the day. Patient had a sleep study in the hospital.  Sleep study ended at 4 AM.  Patient went home went to sleep woke up at 7 AM.  He states about 10 minutes later he had a second episode of slurred speech and according to the wife, he was walking sideways to the right. Patient denies chest pain, nausea, vomiting or diarrhea. No symptoms at this time. Patient denies smoking alcohol or recreational drug use. Initial blood sugar 174. \"    CT scan of the head (8/12/2019) showed no acute intracranial pathology; no acute hemorrhage; mild chronic small vessel ischemic change is noted. [facility] TeleNeurology (Dr. Gideon Rivas) evaluated the patient and intravenous tPA was recommended. The patient was given intravenous tPA. The patient tolerated the procedure well with no intraoperative complications. K (8/12/2019) = 2.4  BUN/Creatinine (8/12/2019) = 26/1.54    CT angiogram of the head and neck (8/12/2019) showed patent intra- and extracranial cerebral arteries; no evidence of high-grade stenosis or acute dissection; there is atherosclerotic disease with mild stenosis of the left MCA at the left trifurcation; other incidental findings. The patient was admitted to the ICU under the service of AREN Moreno (Dr. Cecy Trent). Excerpt (History of the Present Illness) from the H&P by Myesha Hawley PA-C:  \"Patient is a 71 y.o. male with pmhx of HTN who presented to the ED for evaluation of right leg weakness, ataxia, slurred speech and right sided facial droop. Per patient's wife, yesterday around 2 pm while outside at the zoo the patient began experiencing the above stated symptoms. She states that she got him into the car and gave him some water and after approximately 10 minutes his symptoms resolved.  She states that he had a sleep study over night last night and returned home at 4 am this morning. She states that at that time he was asymptomatic. She reports around 7 am when she and the patient woke up and got out of bed he was exhibiting slurred speech, right sided facial droop, ataxia and right leg weakness again and was brought to the ED. Patient evaluated by teleneurology. CT head completed showing no acute intracranial process. Teleneuro recommended tPA which was administered at approximately 0900. Patient was started on a nicardipine gtt for tighter blood pressure control as his SBP >200. CTA head and neck completed w/ formal read pending. Upon arrival to the ICU, patient A&O x3 w/ slurred speech, facial droop and new onset right arm weakness. Patient states that it started approximately an hour PTA to the ICU. On nicardipine gtt. Denies dizziness, lightheadedness, blurred vision, chest pain, abdominal pain, SOB, N/V. Notable labs include K+ of 2.4, Cr of 1.54, and troponin of 0.04.\"    SCD's were used for DVT prophylaxis. MRI of the brain (8/12/2019) showed a small region of acute/early subacute infarct at the left posterior basal ganglia to corona radiata; no hemorrhage or mass effect. Neurology consult (Dr. Osorio Tovar) was called for evaluation and comanagement of the stroke.    K (8/12/2019, 11:28 PM) = 2.4    Lipid profile (8/13/2019) showed , , HDL 42,     HbA1c (8/13/2019) = 6.6    Repeat CT scan of the head (8/13/2019) showed a 2.5 x 1.6 cm infarct in the left basal ganglia; no acute intracranial hemorrhage; no significant mass effect or midline shift.    K (8/13/2019) = 2.8  BUN/Creatinine (8/13/2019) = 26/1.32    On 8/13/2019, patient was transferred out of the ICU and service was transferred to the [facility] (Dr. Parul Barrera). On 8/13/2019, unfractionated heparin was started for DVT prophylaxis.  Patient was given a total of 60 meq of Potassium chloride IV and 40 meq of Potassium chloride PO. Patient was resumed on Amlodipine 10 mg PO once daily, Metoprolol succinate 50 mg PO once daily and Gemfibrozil 600 mg PO BID (home medications). As per note of Neurology (Dr. Casandra Reveles), patient refuses statin agent because of potential side effects.     K (8/14/2019, 3:42 AM) = 2.7  BUN/Creatinine (8/14/2019, 3:42 AM) = 28/1.39    Lipid profile (8/14/2019) showed , , HDL 39, LDL 94    On 8/14/2019, the patient was started on Aspirin 325 mg PO once daily. As per the note of Sorin Mendez NP, patient was agreeable to start a statin. Gemfibrozil was discontinued and Atorvastatin 80 mg PO q HS was started by Dr. Magdaleno Castellon. Hydralazine 50 mg PO q 8 hr was added for BP control. Patient received a total of 70 meq of Potassium chloride IV. Modified barium swallow (8/14/2019) showed silent tracheal aspiration of thin liquids; laryngeal penetration of nectar consistency; no drew penetration or aspiration with other tested consistencies. Patient was started on a mechanical soft diet with nectar-thick liquids. K (8/14/2019, 6:48 PM) = 2.8  BUN/Creatinine (8/14/2019, 6:48 PM) = 26/1.31    K (8/15/2019) = 3.0  BUN/Creatinine (8/15/2019) = 23/1.12    On the morning of 8/15/2019, the patient was noted to have a nosebleed. Neurology had seen the patient had he recommended to decrease the Aspirin dose from 325 mg to 81 mg PO once daily. Patient received 40 meq of Potassium chloride PO and 10 meq of Potassium chloride IV. The patient had remained hemodynamically stable but due to the abovementioned neurologic deficit, the patient was noted to have impaired mobility and ADLs. Patient was felt to be a good candidate for acute inpatient rehabilitation. Upon evaluation by Physical Therapy and Occupational Therapy, the patient was recommended for acute inpatient rehabilitation.      Serum potassium had remained low during the entire hospital stay (ranging from 2.4 to 3.0) despite supplementation. Lab results in [address] shows that prior to admission to [facility], the patient has had a chronically low potassium (since 2009!) and difficult to control hypertension. Patient and his wife had said that the patient has had high blood pressures for more than 23 years and that he always has muscle cramps in his legs. Patient and wife stated that no matter what BP medication was prescribed, his blood pressure was difficult to control and did not respond. Patient and wife had always been mentioning the elevated blood pressure every time they visit their PCP. The wife stated the patient had been dealing with low potassium for a long time. On 2/7/2019, the patient changed PCP from Zully Mclaughlin NP to Amber Zee NP. Upon visit to his PCP Amber Zee NP) on 6/20/2019, a renal ultrasound was ordered to rule out renal artery stenosis. Duplex ultrasound of the renal arteries and veins done 7/8/2019 showed no evidence for hemodynamically significant (>60%) stenosis identified in either renal artery; elevated resistive indices present in both kidneys, consistent with parenchymal disease; bilateral patent renal veins. Due to the chronically low potassium and elevated blood pressure, I called Kaylene Driscoll to discuss the possibility of Primary hyperaldosteronism as the etiology of the persistently low potassium (despite correction) and uncontrolled hypertension (most likely secondary). She discussed this with her attending physician (Dr. Rasheed Damian) and they ordered an Endocrinology consult (Dr. Camacho Goff) for evaluation and management. Patient was cleared for discharge/transfer to inpatient rehabilitation. The patient was discharged and was subsequently admitted to the [facility] for intensive rehabilitation to help recover strength, function and mobility.       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     On Allopurinol    Chronic hypokalemia     Persistent chronic hypokalemia + hypertension; ?primary hyperaldosteronism    CKD (chronic kidney disease) stage 2, GFR 60-89 ml/min 8/15/2019    Current use of aspirin 8/14/2019    Dysarthria 8/12/2019    Dysphagia 8/12/2019    Elevated prostate specific antigen (PSA) 7/21/2011    First degree atrioventricular block by electrocardiogram 8/12/2019    Hypertensive heart and kidney disease without heart failure and with stage 2 chronic kidney disease     Obesity, Class I, BMI 30-34.9     Obstructive sleep apnea on CPAP     On statin therapy due to risk of future cardiovascular event 8/14/2019    On Atorvastatin    Pure hypercholesterolemia 08/15/2019    Lipid profile (8/13/2019) showed , , HDL 42, ; Lipid profile (8/14/2019) showed , , HDL 39, LDL 94    Received intravenous tissue plasminogen activator (tPA) in emergency department 8/12/2019    Refusal of statin medication by patient 8/13/2019    As per note of Neurology (Dr. Endy Forbes), patient refuses statin agent because of potential side effects.  Right hemiparesis ([facility] 8/12/2019    Type 2 diabetes mellitus with stage 2 chronic kidney disease (HCC)     HbA1c (8/13/2019) = 6.6       Past Surgical History:  Past Surgical History:   Procedure Laterality Date    COLONOSCOPY N/A 4/15/2019    COLONOSCOPY performed by Magda Shen MD at [facility] ENDOSCOPY    HX TONSILLECTOMY         Allergies:  No Known Allergies      Social History: The patient is , lives with his wife in a 1-story house with a 2-step entry in [place], [place].  Patient has a remote history of cigarette smoking (quit in ). He drinks a beer every now and then (\"a 6-pack lasts the whole year\"). He denied any illicit drug use. He works part-time for the [employer]. 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 Family History: Both parents are . Family History   Problem Relation Age of Onset    Cancer Mother         Gastric cancer    Heart Disease Father     Prostate Cancer Father        Home Medications (medications taken at home prior to admission to [address] [place] [address] [place]): Outpatient Medications Marked as Taking for the 19 encounter (Office Visit) with Jose Dalal NP   Medication Sig Dispense Refill    allopurinol (ZYLOPRIM) 300 mg PO TABS TAKE 1 TABLET DAILY 90 Tab 3    amLODIPine (NORVASC) 10 mg PO TABS Take 1 Tab by Mouth Once a Day. 90 Tab 3    gemfibrozil (LOPID) 600 mg PO TABS Take 1 Tab by Mouth Twice Daily. 180 Tab 3    hydrALAZINE (APRESOLINE) 25 mg PO TABS Take 1 Tab by Mouth Every 8 Hours. 270 Tab 3    losartan-hydroCHLOROthiazide (HYZAAR) 100-25 mg PO TABS Take 1 Tab by Mouth Once a Day. 90 Tab 3    metoprolol XL (TOPROL XL) 50 mg PO TB24 Take 1 Tab by Mouth Once a Day. 90 Tab 3     potassium chloride ER (K-DUR;KLOR-CON) 10 mEq PO TbTQ Take 1 Tab by Mouth Once a Day. Transfer Medications (from the transfer summary prepared by Rosalinda Chavez NP):    Prior to Admission Medications   Prescriptions Last Dose Informant Patient Reported? Taking? ALLOPURINOL PO   Yes No   Sig: Take 300 mg by mouth daily. acetaminophen (TYLENOL) 325 mg tablet   No No   Sig: Take 2 Tabs by mouth every six (6) hours as needed for Pain. amLODIPine (NORVASC) 10 mg tablet   Yes No   Sig: Take  by mouth daily. aspirin delayed-release 81 mg tablet   No No   Sig: Take 1 Tab by mouth daily for 30 days. atorvastatin (LIPITOR) 80 mg tablet   No No   Sig: Take 1 Tab by mouth nightly.  Indications: stroke prevention   hydrALAZINE (APRESOLINE) 50 mg tablet No No   Sig: Take 1 Tab by mouth three (3) times daily. magnesium oxide (MAG-OX) 400 mg tablet   No No   Sig: Take 1 Tab by mouth daily. metoprolol succinate (TOPROL-XL) 50 mg XL tablet   Yes No   Sig: Take  by mouth daily. potassium chloride (KLOR-CON) 20 mEq pack   No No   Sig: Take 2 Packets by mouth two (2) times daily (with meals) for 2 days. Facility-Administered Medications: None       Review Of Systems:   CONSTITUTIONAL: No weight loss. EYES: No blurred vision and no eye discharge. ENT: No nasal discharge. No ear pain. CARDIOVASCULAR: No chest pain and no diaphoresis. RESPIRATORY: No cough, no hemoptysis. GI: No vomiting, no diarrhea   : No urinary frequency and no dysuria. MUSCULOSKELETAL: No muscle pains. SKIN: No rashes. NEURO: As above. ENDOCRINE: No polyphagia and no polydipsia. HEMATOLOGY: No bleeding tendencies. Objective:     Vital Signs:  Patient Vitals for the past 24 hrs:   BP Temp Pulse Resp SpO2   08/16/19 1037 (!) 183/94 -- 95 -- 90 %   08/16/19 1022 168/88 -- 82 -- 96 %   08/16/19 0945 163/85 -- -- -- --   08/16/19 0812 (!) 179/95 98.9 °F (37.2 °C) 82 18 96 %   08/16/19 0549 (!) 192/95 -- 75 -- 99 %   08/15/19 1637 192/90 97.7 °F (36.5 °C) 85 18 97 %        Body mass index is 33.21 kg/m². Physical Examination:  GENERAL SURVEY: Patient is awake, alert, oriented x 3, sitting comfortably on the chair, not in acute respiratory distress.   HEENT: pink palpebral conjunctivae, anicteric sclerae, no nasoaural discharge, moist oral mucosa  NECK: supple, no jugular venous distention, no palpable lymph nodes  CHEST/LUNGS: symmetrical chest expansion, good air entry, clear breath sounds  HEART: adynamic precordium, good S1 S2, no S3, regular rhythm, no murmurs  ABDOMEN: obese, bowel sounds appreciated, soft, non-tender  EXTREMITIES: pink nailbeds, no edema, full and equal pulses, no calf tenderness   NEUROLOGICAL EXAM: The patient is awake, alert and oriented x3, able to answer questions fairly appropriately, able to follow 1 and 2 step commands. Able to tell time from the wall clock. Cranial nerves II-XII are grossly intact except for slightly shallow right nasolabial fold, dysarthria and dysphagia. No gross sensory deficit. Motor strength is 5/5 on the LUE and LLE, 0/5 on the RUE (except for 1/5 on the right shoulder), 2 to 2+/5 on RLE (except for 0/5 on the right ankle/foot).       Current Medications:  Current Facility-Administered Medications   Medication Dose Route Frequency    acetaminophen (TYLENOL) tablet 650 mg  650 mg Oral Q4H PRN    bisacodyl (DULCOLAX) tablet 10 mg  10 mg Oral Q48H PRN    heparin (porcine) injection 5,000 Units  5,000 Units SubCUTAneous Q8H    insulin lispro (HUMALOG) injection   SubCUTAneous TIDAC    magnesium oxide (MAG-OX) tablet 400 mg  400 mg Oral DAILY    potassium chloride (KLOR-CON) packet for solution 40 mEq  40 mEq Oral BID WITH MEALS    aspirin chewable tablet 81 mg  81 mg Oral DAILY WITH BREAKFAST    amLODIPine (NORVASC) tablet 10 mg  10 mg Oral DAILY    metoprolol succinate (TOPROL-XL) XL tablet 50 mg  50 mg Oral DAILY    atorvastatin (LIPITOR) tablet 80 mg  80 mg Oral QHS    hydrALAZINE (APRESOLINE) tablet 75 mg  75 mg Oral Q8H       Functional Assessment:     Occupational Therapy   Prior Level of Function  Pre-Admission Screen  Post-Admission Evaluation   Eating   Independent Eating   Supervision Eating  Functional Level: (P) 4   Grooming   Independent Grooming   Moderate Assist Grooming  Functional Level: 3   Upper Body Dressing   Independent Upper Body Dressing   Moderate Assist Upper Body Dressing  Functional Level: 4   Lower Body Dressing   Independent Lower Body Dressing   Dependent Lower Body Dressing  Functional Level: 2   Bladder Management   Independent Bladder Management   Maximal Assist Toileting  Functional Level: 2   Bowel Management   Independent Bowel Management   Maximal Assist      Physical Therapy Prior Level of Function  Pre-Admission Screen  Post-Admission Evaluation   Ambulation   Independent Ambulation   Not evaluated Gait  Amount of Assistance: 2 (Maximal assistance)  Distance (ft): 3 Feet (ft)  Assistive Device: Cane, quad   Bed Mobility   Independent Bed Mobility   Maximal Assist Bed/Mat Mobility  Rolling Right : 6 (Modified independent)  Rolling Left : 3 (Moderate assistance )  Supine to Sit : 3 (Moderate assistance)  Sit to Supine : 3 (Moderate assistance)   Supine to Sit   Independent Supine to Sit   Dependent Bed/Mat Mobility  Rolling Right : 6 (Modified independent)  Rolling Left : 3 (Moderate assistance )  Supine to Sit : 3 (Moderate assistance)  Sit to Supine : 3 (Moderate assistance)   Sit to Stand   Independent Sit to Stand   Dependent Bed/Mat Mobility  Rolling Right : 6 (Modified independent)  Rolling Left : 3 (Moderate assistance )  Supine to Sit : 3 (Moderate assistance)  Sit to Supine : 3 (Moderate assistance)   Bed/Chair Transfers   Independent Bed/Chair Transfers   Not evaluated Transfers  Transfer Type: (stand step without assistive device)  Transfer Assistance : 2 (Maximal assistance)  Sit to Stand Assistance: Maximum assistance   Toilet Transfers   Independent Toilet Transfers   Not evaluated Toilet Transfers  Toilet Transfer Score: 1     Speech and Language Pathology  Post-Admission Evaluation     Comprehension (Native Language)  Primary Mode of Comprehension: Auditory  Score: 4     Expression (Native Language)  Primary Mode of Expression: Verbal  Score: 5     Social Interaction/Pragmatics  Score: 7     Problem Solving  Score: 4     Memory  Score: 6       Legend:   7 - Independent   6 - Modified Independent   5 - Standby Assistance / Supervision / Set-up   4 - Minimum Assistance / Contact Guard Assistance   3 - Moderate Assistance   2 - Maximum Assistance   1 - Total Assistance / Dependent       Labs on Admission:  Recent Results (from the past 24 hour(s))   GLUCOSE, POC Collection Time: 08/15/19  4:58 PM   Result Value Ref Range    Glucose (POC) 126 (H) 70 - 110 mg/dL   GLUCOSE, POC    Collection Time: 08/15/19  9:13 PM   Result Value Ref Range    Glucose (POC) 126 (H) 70 - 110 mg/dL   CBC WITH AUTOMATED DIFF    Collection Time: 08/16/19  6:30 AM   Result Value Ref Range    WBC 17.9 (H) 4.6 - 13.2 K/uL    RBC 3.86 (L) 4.70 - 5.50 M/uL    HGB 10.7 (L) 13.0 - 16.0 g/dL    HCT 32.6 (L) 36.0 - 48.0 %    MCV 84.5 74.0 - 97.0 FL    MCH 27.7 24.0 - 34.0 PG    MCHC 32.8 31.0 - 37.0 g/dL    RDW 14.4 11.6 - 14.5 %    PLATELET 378 587 - 905 K/uL    MPV 11.3 9.2 - 11.8 FL    NEUTROPHILS 91 (H) 42 - 75 %    LYMPHOCYTES 4 (L) 20 - 51 %    MONOCYTES 3 2 - 9 %    EOSINOPHILS 2 0 - 5 %    BASOPHILS 0 0 - 3 %    ABS. NEUTROPHILS 16.3 (H) 1.8 - 8.0 K/UL    ABS. LYMPHOCYTES 0.7 (L) 0.8 - 3.5 K/UL    ABS. MONOCYTES 0.5 0 - 1.0 K/UL    ABS. EOSINOPHILS 0.4 0.0 - 0.4 K/UL    ABS.  BASOPHILS 0.0 0.0 - 0.06 K/UL    DF MANUAL      PLATELET COMMENTS ADEQUATE PLATELETS      RBC COMMENTS NORMOCYTIC, NORMOCHROMIC     MAGNESIUM    Collection Time: 08/16/19  6:30 AM   Result Value Ref Range    Magnesium 1.9 1.6 - 2.6 mg/dL   METABOLIC PANEL, BASIC    Collection Time: 08/16/19  6:30 AM   Result Value Ref Range    Sodium 142 136 - 145 mmol/L    Potassium 3.8 3.5 - 5.5 mmol/L    Chloride 107 100 - 111 mmol/L    CO2 25 21 - 32 mmol/L    Anion gap 10 3.0 - 18 mmol/L    Glucose 136 (H) 74 - 99 mg/dL    BUN 26 (H) 7.0 - 18 MG/DL    Creatinine 1.20 0.6 - 1.3 MG/DL    BUN/Creatinine ratio 22 (H) 12 - 20      GFR est AA >60 >60 ml/min/1.73m2    GFR est non-AA >60 >60 ml/min/1.73m2    Calcium 8.3 (L) 8.5 - 10.1 MG/DL   URIC ACID    Collection Time: 08/16/19  6:30 AM   Result Value Ref Range    Uric acid 6.7 2.6 - 7.2 MG/DL   HEPATIC FUNCTION PANEL    Collection Time: 08/16/19  6:30 AM   Result Value Ref Range    Protein, total 6.5 6.4 - 8.2 g/dL    Albumin 3.2 (L) 3.4 - 5.0 g/dL    Globulin 3.3 2.0 - 4.0 g/dL    A-G Ratio 1.0 0.8 - 1.7      Bilirubin, total 0.8 0.2 - 1.0 MG/DL    Bilirubin, direct 0.2 0.0 - 0.2 MG/DL    Alk. phosphatase 188 (H) 45 - 117 U/L    AST (SGOT) 14 10 - 38 U/L    ALT (SGPT) 15 (L) 16 - 61 U/L   GLUCOSE, POC    Collection Time: 08/16/19  8:16 AM   Result Value Ref Range    Glucose (POC) 157 (H) 70 - 110 mg/dL       Estimated Glomerular Filtration Rate:  On admission, estimated GFR based on a Creatinine of 1.20 mg/dl:   Using CKD-EPI = 63.1 mL/min/1.73m2   Using MDRD = 64.6 mL/min/1.73m2      Assessment:     Primary Rehabilitation Diagnosis  1. Impaired Mobility and ADLs  2. Acute Ischemic Stroke (acute/early subacute infarct at the left posterior basal ganglia to corona radiata) with residual right hemiparesis, dysphagia and dysarthria    Comorbidities   Obesity, Class I, BMI 30-34.9 E66.9    Obstructive sleep apnea on CPAP G47.33, Z99.89    Hypertensive heart and kidney disease without heart failure and with stage 2 chronic kidney disease I13.10, N18.2    Chronic hypokalemia E87.6    CKD (chronic kidney disease) stage 2, GFR 60-89 ml/min N18.2    Current use of aspirin Z79.82    On statin therapy due to risk of future cardiovascular event Z79.899    Type 2 diabetes mellitus with stage 2 chronic kidney disease  E11.22, N18.2    Pure hypercholesterolemia E78.00    Elevated prostate specific antigen (PSA) R97.20    Refusal of statin medication by patient Z48.34    First degree atrioventricular block by electrocardiogram I44.0    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 20X0    Leukocytosis D72.829       Willingness to participate in the program: Good      Rehabilitation Potential: Good      Plan:     1. Medical Issues being followed closely:    [x]  Fall and safety precautions     []  Wound Care     [x]  Bowel and Bladder Function     [x]  Fluid Electrolyte and Nutrition Balance     []  Pain Control      2.  Issues that 24 hour rehabilitation nursing is following:    [x]  Fall and safety precautions     []  Wound Care     [x]  Bowel and Bladder Function     [x]  Fluid Electrolyte and Nutrition Balance     []  Pain Control      [x]  Assistance with and education on in-room safety with transfers to and from the bed, wheelchair, toilet and shower. 3. Acute rehabilitation plan of care:    [x]  Patient to be evaluated and treated by:           [x]  Physical Therapy           [x]  Occupational Therapy           [x]  Speech Therapy     []  Hold Rehab until further notice     5. Medications:    [x]  MAR Reviewed     [x]  Continue Present Medications     6. DVT Prophylaxis:      []  Lovenox     [x]  Unfractionated Heparin     []  Coumadin     []  NOAC     []  SUZAN Stockings     []  Sequential Compression Device     []  None     7. Rehabilitation program and expectations from patient, as well as medical issues discussed with the patient. REHABILITATION PLAN:    1. The patient is being admitted to a comprehensive acute inpatient rehabilitation program consisting of at least 180 minutes a day, 5 out of 7 days a week of  combined physical, occupational and speech therapy, and close supervision by a physician with special training and experience in rehabilitation medicine. 2. The patient's prognosis for significant practical improvement within a reasonable period of time appears to be good. 3. The estimated length of stay is 17 days. 4. The patient/family has a good understanding of our discharge process. The patient has potential to make improvement and is in need of at least two of the following multidisciplinary therapies including but not limited to physical, occupational, speech, nutritional services, wound care, prosthetics and orthotics. The patient is expected to be able to return to home with home health therapy and family support.   5. Given the patient's multiple co-morbidities and risk for further medical complications, rehabilitation services could not be safely provided at a lower level of care such as a skilled nursing facility. 6. Physical therapy for therapeutic exercise, progressive mobility, gait training, transfer training, bed mobility training, patient and family education, and wheelchair mobility training. Physical therapy goals to address - extremity function, range of motion, balance, safety awareness, independence in transfers, activity tolerance, independence in bed mobility, and independence in ambulation. 7. Occupational therapy for self-care home management, transfer training, therapeutic exercise, activity, wheelchair mobility training. Occupational therapy goals to address - extremity function, cognition, balance, activity tolerance, independence in functional transfers, range of motion, safety awareness, independence in ADL  and independence in home management skills. 8. Speech and Language Pathology for cognitive training, dysphagia therapy, speech and language communication therapy. Goals for speech therapy to address cognition, expression of language, comprehension, safety awareness and safe swallow. 9. Specialized 24 hour rehabilitation nursing care for bowel and bladder retraining, disease management, pain management, pressure ulcer prevention and management per policy, education on pressure relief techniques, embolism prevention, nutrition management, hydration management, transfer training and   medication distribution. 10. Nutrition and Dietary services will be obtained for assessment of adequate calorie needs, hydration and calorie counts as appropriate. 11. Therapeutic recreation for leisure skills. 15. Rehab psychology for coping skills. 15. Social work services for patient and family counseling and safe discharge planning. 14. I will be in charge of the inpatient rehab program. Full details of the inpatient rehab program will be outlined in the initial team conference.        REHABILITATION GOALS:  Improve functional and activities of daily living skills in order to return back to independent living with family support. MEDICAL PLAN:  > Acute Ischemic Stroke (acute/early subacute infarct at the left posterior basal ganglia to corona radiata) with residual right hemiparesis, dysphagia and dysarthria; S/P Administration of intravenous tPA (8/12/2019 - [address])   > Aspirin 81 mg PO once daily with breakfast   > Atorvastatin 80 mg PO q HS    > Chronic hypokalemia    Serum Potassium during hospital stay at [address]      08/15/19  0330 08/14/19  1848 08/14/19  0342 08/13/19  1205 08/12/19  2328   K 3.0* 2.8* 2.7* 2.8* 2.4*          > K (8/16/2019, on admission) = 3.8   > Mg (8/16/2019, on admission) = 1.9   > Klor Con 40 meq PO BID with meals x 2 days   > Mg(OH)2 400 mg PO once daily    > Hypertensive heart and kidney disease without heart failure and with stage 2 chronic kidney disease   > Upon admission to the ARU, increased Hydralazine from 50 mg to 75 mg PO q 8 hr (6AM, 2PM, 10PM)   > Discontinue Metoprolol succinate 50 mg PO once daily (6AM)   > Start:    > Labetalol 200 mg PO q 12 hr (9AM, 9PM)    > Isosorbide dinitrate 10 mg P) q 8 hr (6AM, 2PM, 10PM)   > Amlodipine 10 mg PO once daily (9AM)   > Hydralazine 75 mg PO q 8 hr (6AM, 2PM, 10PM)   > Once work-up for primary hyperaldosteronism is complete, plan to start Spironolactone 25 mg PO once daily    > Pure hypercholesterolemia   > Lipid profile (8/13/2019) showed , , HDL 42,    > Lipid profile (8/14/2019) showed , , HDL 39, LDL 94   > Atorvastatin 80 mg PO q HS    > Type 2 diabetes mellitus with stage 2 chronic kidney disease   > HbA1c (8/13/2019) = 6.6   > Humalog insulin sliding scale SC TID AC    >  ? Primary hyperaldosteronism as etiology of chronic hypokalemia and difficult to control hypertension   > Endocrinology consult (Dr. Savannah Fletcher) was called at [facility] [place] DR. JACKMAN'S \[facility] [place] [facility]\"" prior to discharge/transfer to the ARU   > Aldosterone/Renin activity (8/16/2019): PENDING   > Spoke with Dr. Ju Pierce about the case - will await results of Aldosterone/Renin activity and will see patient tomorrow   > Once work-up for primary hyperaldosteronism is complete, plan to start Spironolactone 25 mg PO once daily    > Leukocytosis   > WBC count (8/15/2019) = 11.7   > No fever documented since admission to the ARU   > WBC count (8/16/2019, on admission) = 17.9   > Work-up:    > Urinalysis (8/16/2019): WNL    > Chest x-ray (PA-Lateral) (8/16/2019) showed a minimally blunted left posterior costophrenic angle could be due to either a tiny effusion or chronic pleural reaction/scarring; mild cardiomegaly; atherosclerosis. > Diet:   > Specifications: Diabetic, low saturated fat   > Solids (consistency): Mechanical soft (NDD 2)    > Liquids (consistency): Mildly thick (Nectar-thick)       PRECAUTIONS:   1. Safety/fall precautions. 2. Deep venous thrombosis precautions. 3. Aspiration precautions. POTENTIAL BARRIERS TO DISCHARGE: Risk for falls. RELEVANT CHANGES SINCE PREADMISSION SCREENING: I have compared the patients medical and functional status at the time of the pre-admission screening and on this post-admission evaluation. The preadmission screen and findings from therapy evaluations both support my post admission physician evaluation, deeming this patient to be an appropriate candidate for the IRF. The patient requires multidisciplinary treatment, physician oversight and intensive therapy not provided at a lower level of care. By signing this document, I acknowledge that I have personally performed a full physical examination on this patient within 24 hours of admission to this inpatient rehabilitation facility and have determined the patient to be able to tolerate the above course of treatment at an intensive level for a reasonable period of time.  I will be completing a detailed individualized plan of care for this patient by day #4 of the patients stay based upon the Pre-Admission Screen, the Post-Admission Evaluation, and the therapy evaluations.       Signed:    Catie Lagunas MD    August 16, 2019

## 2019-08-16 NOTE — PROGRESS NOTES
Problem: Dysphagia (Adult)  Goal: *Speech Goal: (INSERT TEXT)  Description  Long term goals  Patient will:  1. Tolerate regular diet with thin liquids using safe swallowing techniques without s/s of aspiration in 5/6 meals. 2. Use safe swallowing techniques (including slowed rate, small bites/sips, alternate liquids/solids, effortful swallow, head rotation to right for liquids) with supervision. 3. Participate in MBS study prior to advancing to thin liquids to assure safety (no aspiration). 4. Perform oral and pharyngeal strengthening exercises (to improve speech and swallowing) with supervision-modified independence. 5. Demonstrate 90% intelligibility in conversation using appropriate speaking strategies (slowed rate, overarticulation, increased volume). 6. Recall 3 words after 5 minutes with supervision. 7. Perform functional problem solving/reasoning tasks with 90% accuracy. 8. Name 8-10 items in categories of increasing abstraction, supervision. Short term goals (by 8/23/19)  Patient will:   1. Tolerate soft diet with nectar thick liquids using safe swallowing techniques without s/s of aspiration in 5/6 meals. 2. Use safe swallowing techniques (including slowed rate, small bites/sips, alternate liquids/solids, effortful swallow, head rotation to right for liquids) with min cues. 3. Tolerate small amounts of ice chips with the SLP using head rotation to the right and/or chin tuck, without overt s/s of aspiration. 4. Perform oral and pharyngeal strengthening exercises (to improve speech and swallowing) with mod-min assist.  5. Demonstrate 90% intelligibility in single words of up to 4 syllables using appropriate speaking strategies (slowed rate, overarticulation, increased volume). 6. Recall 3 words after 5 minutes with mod assist.  7. Perform functional problem solving/reasoning tasks with 75-85% accuracy. 8. Name 8-10 items in concrete categories, supervision.      Note:   SPEECH LANGUAGE PATHOLOGY EVALUATION    Patient: Esperanza Francisco (54 y.o. male)  Date: 8/16/2019  Primary Diagnosis: Acute ischemic stroke ([facility] [I63.9]        Precautions:   Aspiration, NWB  Time In: 1340  Time Out[de-identified]  0401  SUBJECTIVE:   Patient stated I'm retired, but still work part time. OBJECTIVE:     Past Medical History:   Diagnosis Date    Acute ischemic stroke ([facility] [address] 8/12/2019    Acute Ischemic Stroke (acute/early subacute infarct at the left posterior basal ganglia to corona radiata) with residual right hemiparesis, dysphagia and dysarthria    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     On Allopurinol    Chronic hypokalemia     Persistent chronic hypokalemia + hypertension; ?primary hyperaldosteronism    CKD (chronic kidney disease) stage 2, GFR 60-89 ml/min 8/15/2019    Current use of aspirin 8/14/2019    Dysarthria 8/12/2019    Dysphagia 8/12/2019    Elevated prostate specific antigen (PSA) 7/21/2011    First degree atrioventricular block by electrocardiogram 8/12/2019    Hypertensive heart and kidney disease without heart failure and with stage 2 chronic kidney disease     Obesity, Class I, BMI 30-34.9     Obstructive sleep apnea on CPAP     On statin therapy due to risk of future cardiovascular event 8/14/2019    On Atorvastatin    Pure hypercholesterolemia 08/15/2019    Lipid profile (8/13/2019) showed , , HDL 42, ; Lipid profile (8/14/2019) showed , , HDL 39, LDL 94    Received intravenous tissue plasminogen activator (tPA) in emergency department 8/12/2019    Refusal of statin medication by patient 8/13/2019    As per note of Neurology (Dr. Shantal Purcell), patient refuses statin agent because of potential side effects.      Right hemiparesis ([address] 8/12/2019    Type 2 diabetes mellitus with stage 2 chronic kidney disease ([address]     HbA1c (8/13/2019) = 6.6     Past Surgical History:   Procedure Laterality Date    COLONOSCOPY N/A 4/15/2019    COLONOSCOPY performed by Isaura Payton MD at [facility]    HX TONSILLECTOMY       Prior Level of Function/Home Situation: Independent  Barriers to Learning/Limitations: yes;  cognitive  Compensate with: visual, verbal, tactile, kinesthetic cues/model  Home Situation  Home Environment: Private residence  # Steps to Enter: 2  Rails to Enter: No  One/Two Story Residence: One story  Living Alone: No(Living with spouse)  Support Systems: Friends \ neighbors, Spouse/Significant Other/Partner  Patient Expects to be Discharged to[de-identified] Private residence  Current DME Used/Available at Home: [employer], rollator  Tub or Shower Type: Tub/Shower combination  Mental Status:  Neurologic State: Alert  Orientation Level: Oriented X4  Cognition: Appropriate for age attention/concentration, Memory loss  Perception: Appears intact  Perseveration: No perseveration noted  Safety/Judgement: Fall prevention  Motor Speech:  Oral-Motor Structure/Motor Speech  Labial: Impaired coordination;Right droop  Dentition: Upper & lower dentures  Oral Hygiene: WFL  Lingual: Incoordinated  Velum: No impairment  Mandible: No impairment  Dysarthric Characteristics: Decreased breath support; Imprecise  Intelligibility: Impaired  Word Intelligibility (%): 90 %  Phrase Intelligibility (%): 80 %  Conversation Intelligibility (%): 70 %  Intonation: WFL  Rate: WFL  Prosody: WFL  Overall Impairment Severity: Mild-moderate  Language Comprehension and Expression:  Auditory Comprehension  Auditory Impairment: Yes  Three-Step Basic Commands (%): 100 %  Complex Commands (%): 35 %  Interfering Components: Attention to detail; Impulsivity; Working memory  Effective Techniques: Repetition; Slowed speech;Stressing words  Cueing type: Verbal  Cueing amount: Min-mod  Verbal Expression  Primary Mode of Expression: Verbal  Initiation: No impairment  Automatic Speech Task: No impairment  Repetition: No impairment  Naming: Impaired  Divergent (%): (Limited in verbal flucency task on BDAE.)  Sentence Formulation: No impairment  Conversation: Dysarthric  Speech Characteristics: Word retrieval  Interfering Components: Impulsive  Effective Techniques: Decreased rate;Pacing;Phrasing; Word retrieval strategies  Overall Impairment: Mild  Reading Comprehension  Visual Impairment: Glasses/contacts  Pre-Morbid Reading Status: Literate  Scanning/Tracking : No impairment  Words : Yes  Sentence: No Impairment  Paragraph : (Mild impairment)  Oral Reading: No impairment  Interfering Components: Attention to detail;Processing time  Effective Techniques: Pacing;Prescription glasses/contact lenses  Overall Impairment Severity: Minimal  Written Expression  Pre-Morbid Dominant Hand: Left  Pre-Morbid Writing Skills: WFL  Legibility : Decreased legibility;Uses dominant hand  Dictation : No impairment  Formulation: No impairment  Interfering Components: Sputial organization  Effective Techniques: Language cues  Overall Impairment Severity: Minimal  Neuro-Linguistics:  Verbal Reasoning Tasks: Impaired  Verbal Problem Solving: Impaired  Verbal Organization: No Impairment  Thought Organization: WFL  Mathematical: (DNT)  Memory: Impaired  Attention : No Impairement  Pragmatics:  Pragmatics Impairment: No impairment  Voice:  Vocal Quality: No impairment     Pain:  Pre-Treatment:    No report of pain  Post-Treatment:  No report of pain    After treatment:   ?              Patient left in no apparent distress sitting up in chair  ? Patient left in no apparent distress in bed  ? Call bell left within reach  ? Nursing notified  ? Caregiver present  ? Bed alarm activated    ASSESSMENT:   Based on the objective data described below, the patient presents with mild cognitive-linguistic deficits secondary to left CVA. Patient will benefit from skilled intervention to address the above impairments.   Patients rehabilitation potential is considered to be Good  Factors which may influence rehabilitation potential include:   ? None noted  ? Mental ability/status  ? Medical condition  ? Home/family situation and support systems  ? Safety awareness  ? Pain tolerance/management  ? Other:     PLAN:   Recommendations and Planned Interventions:  SLP will follow daily for speech production and cognition per the above care plan. Frequency/Duration: Patient will be followed by speech-language pathology 1-2 times per day/4-7 days per week to address goals. Discharge Recommendations: Home Health    COMMUNICATION/EDUCATION:   ?  Patient/family have participated as able in goal setting and plan of care. ?  Patient/family agree to work toward stated goals and plan of care. ?  Patient understands intent and goals of therapy, but is neutral about his/her participation. ? Patient is unable to participate in goal setting and plan of care. Estimated LOS: 3 weeks    Thank you for this referral.  SILVERIO Clarke  Time Calculation:  36 Minutes        [address] EVALUATION    Patient: Ebb Denver (1 y.o. male)  Date: 8/16/2019  Primary Diagnosis: Acute ischemic stroke ([facility] [I63.9]        Precautions:   Aspiration, NWB  Time In: 1420  Time Out[de-identified]  1430  SUBJECTIVE:   Patient stated I was thirsty.     OBJECTIVE:     Past Medical History:   Diagnosis Date    Acute ischemic stroke ([facility] [address] 8/12/2019    Acute Ischemic Stroke (acute/early subacute infarct at the left posterior basal ganglia to corona radiata) with residual right hemiparesis, dysphagia and dysarthria    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     On Allopurinol    Chronic hypokalemia     Persistent chronic hypokalemia + hypertension; ?primary hyperaldosteronism    CKD (chronic kidney disease) stage 2, GFR 60-89 ml/min 8/15/2019    Current use of aspirin 8/14/2019    Dysarthria 8/12/2019    Dysphagia 8/12/2019    Elevated prostate specific antigen (PSA) 7/21/2011    First degree atrioventricular block by electrocardiogram 8/12/2019    Hypertensive heart and kidney disease without heart failure and with stage 2 chronic kidney disease     Obesity, Class I, BMI 30-34.9     Obstructive sleep apnea on CPAP     On statin therapy due to risk of future cardiovascular event 8/14/2019    On Atorvastatin    Pure hypercholesterolemia 08/15/2019    Lipid profile (8/13/2019) showed , , HDL 42, ; Lipid profile (8/14/2019) showed , , HDL 39, LDL 94    Received intravenous tissue plasminogen activator (tPA) in emergency department 8/12/2019    Refusal of statin medication by patient 8/13/2019    As per note of Neurology (Dr. Holden Bueno), patient refuses statin agent because of potential side effects.      Right hemiparesis ([facility] [address] 8/12/2019    Type 2 diabetes mellitus with stage 2 chronic kidney disease (HCC)     HbA1c (8/13/2019) = 6.6     Past Surgical History:   Procedure Laterality Date    COLONOSCOPY N/A 4/15/2019    COLONOSCOPY performed by Lloyd Dunn MD at [facility] ENDOSCOPY    HX TONSILLECTOMY       Prior Level of Function/Home Situation: Independent  Home Situation  Home Environment: Private residence  # Steps to Enter: 2  Rails to Enter: No  One/Two Story Residence: One story  Living Alone: No(Living with spouse)  Support Systems: Friends \ neighbors, Spouse/Significant Other/Partner  Patient Expects to be Discharged to[de-identified] Private residence  Current DME Used/Available at Home: Coletta Brownie, rollator  Tub or Shower Type: Tub/Shower combination  Diet prior to admission: Mechanical soft/nectar thick  Current Diet:  Advanced soft/nectar thick  Barriers to Learning/Limitations: yes;  cognitive  Compensate with: visual, verbal, tactile, kinesthetic cues/model  Cognitive and Communication Status:  Neurologic State: Alert  Orientation Level: Oriented X4  Cognition: Appropriate for age attention/concentration, Memory loss  Perception: Appears intact  Perseveration: No perseveration noted  Safety/Judgement: Fall prevention  Oral Assessment:  Oral Assessment  Labial: Impaired coordination;Right droop  Dentition: Upper & lower dentures  Oral Hygiene: WFL  Lingual: Incoordinated  Velum: No impairment  Mandible: No impairment  P.O. Trials:  Patient Position: Seated  Vocal quality prior to P.O.: No impairment  Consistency Presented: Ice chips; Nectar thick liquid;Puree; Solid  How Presented: Self-fed/presented     Bolus Acceptance: No impairment  Bolus Formation/Control: No impairment  Type of Impairment: Mastication  Propulsion: Delayed (# of seconds)  Oral Residue: Less than 10% of bolus; Lingual  Initiation of Swallow: Delayed (# of seconds)  Laryngeal Elevation: Functional  Aspiration Signs/Symptoms: None  Pharyngeal Phase Characteristics: Effortful swallow  Effective Modifications: Alternate liquids/solids; Chin tuck; Right head turn;Small sips and bites  Cues for Modifications: Minimal     Pain:  Pre-Treatment:    No report of pain  Post-Treatment:  No report of pain    After treatment:   ?            Patient left in no apparent distress sitting up in chair  ? Patient left in no apparent distress in bed  ? Call bell left within reach  ? Nursing notified  ? Caregiver present  ? Bed alarm activated    COMMUNICATION/EDUCATION:   ?            Posted safety precautions in patient's room. ? Patient/family have participated as able in goal setting and plan of care. ?            Patient/family agree to work toward stated goals and plan of care. ?            Patient understands intent and goals of therapy, but is neutral about his/her participation. ? Patient is unable to participate in goal setting and plan of care. ASSESSMENT:   Based on the objective data described above, the patient presents with mild to moderate oral-pharyngeal dysphagia.     Patient will benefit from skilled intervention to address the above impairments. Patients rehabilitation potential is considered to be Good  Factors which may influence rehabilitation potential include:   ? None noted  ? Mental ability/status  ? Medical condition  ? Home/family situation and support systems  ? Safety awareness  ? Pain tolerance/management  ? Other:    PLAN:   Recommendations and Planned Interventions:  SLP will follow daily and in dining group per the above plan of care. Frequency/Duration: Patient will be followed by speech-language pathology 1-2 times per day/4-7 days per week to address goals.   Discharge Recommendations: Home Health    Estimated LOS: 3 weeks    Thank you for this referral.  Clotilde Brown, SLP  Time Calculation:  10 Minutes

## 2019-08-16 NOTE — ROUTINE PROCESS
[facility]    Bedside and Verbal shift change report given to Toby Wilde RN (oncoming nurse) by Tip Romano RN   (offgoing nurse). Report included the following information SBAR, Kardex, MAR and Recent Results.     Situation:   Code Status: Full Code   Reason for Admission: CVA  Hospital Day: 1   Problem List:   Hospital Problems  Date Reviewed: 8/16/2019          Codes Class Noted POA    Leukocytosis ICD-10-CM: D72.829  ICD-9-CM: 288.60  8/16/2019 Yes    Overview Signed 8/16/2019 11:19 AM by Norma Gleason MD     WBC count (8/16/2019) = 17.9             Hypertensive heart and kidney disease without heart failure and with stage 2 chronic kidney disease (Chronic) ICD-10-CM: I13.10, N18.2  ICD-9-CM: 404.90, 585.2  Unknown Yes        Chronic hypokalemia ICD-10-CM: E87.6  ICD-9-CM: 276.8  Unknown Yes    Overview Addendum 8/15/2019 11:31 AM by Norma Gleason MD     Persistent chronic hypokalemia + hypertension; ?primary hyperaldosteronism             Type 2 diabetes mellitus with stage 2 chronic kidney disease (HCC) (Chronic) ICD-10-CM: E11.22, N18.2  ICD-9-CM: 250.40, 585.2  Unknown Yes    Overview Signed 8/15/2019 12:26 PM by Norma Gleason MD     HbA1c (8/13/2019) = 6.6             Pure hypercholesterolemia (Chronic) ICD-10-CM: E78.00  ICD-9-CM: 272.0  8/15/2019 Yes    Overview Addendum 8/15/2019  6:27 PM by Norma Gleason MD     Lipid profile (8/13/2019) showed , , HDL 42, ; Lipid profile (8/14/2019) showed , , HDL 39, LDL 94             Current use of aspirin ICD-10-CM: Z79.82  ICD-9-CM: V58.66  8/14/2019 Yes        On statin therapy due to risk of future cardiovascular event ICD-10-CM: Z79.899  ICD-9-CM: V58.69  8/14/2019 Yes    Overview Signed 8/15/2019 12:21 PM by Norma Gleason MD     On Atorvastatin             Refusal of statin medication by patient ICD-10-CM: Z53.29  ICD-9-CM: V64.2  8/13/2019 Yes    Overview Signed 8/15/2019  2:01 PM by Elena Mcnally Dionne Worrell MD     As per note of Neurology (Dr. Sumit Atkinson), patient refuses statin agent because of potential side effects.               * (Principal) Acute ischemic stroke [facility]) ICD-10-CM: I63.9  ICD-9-CM: 434.91  8/12/2019 Yes    Overview Signed 8/15/2019 12:17 AM by Gwen Espinosa MD     Acute Ischemic Stroke (acute/early subacute infarct at the left posterior basal ganglia to corona radiata) with residual right hemiparesis, dysphagia and dysarthria             Impaired mobility and ADLs ICD-10-CM: Z74.09  ICD-9-CM: 799.89  8/12/2019 Yes        Received intravenous tissue plasminogen activator (tPA) in emergency department ICD-10-CM: Z92.82  ICD-9-CM: V45.88  8/12/2019 Yes        Right hemiparesis ([facility] [address] ICD-10-CM: G81.91  ICD-9-CM: 342.90  8/12/2019 Yes        Dysphagia ICD-10-CM: R13.10  ICD-9-CM: 787.20  8/12/2019 Yes        Dysarthria ICD-10-CM: R47.1  ICD-9-CM: 784.51  8/12/2019 Yes              Background:   Past Medical History:   Past Medical History:   Diagnosis Date    Acute ischemic stroke ([facility] [address] 8/12/2019    Acute Ischemic Stroke (acute/early subacute infarct at the left posterior basal ganglia to corona radiata) with residual right hemiparesis, dysphagia and dysarthria    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     On Allopurinol    Chronic hypokalemia     Persistent chronic hypokalemia + hypertension; ?primary hyperaldosteronism    CKD (chronic kidney disease) stage 2, GFR 60-89 ml/min 8/15/2019    Current use of aspirin 8/14/2019    Dysarthria 8/12/2019    Dysphagia 8/12/2019    Elevated prostate specific antigen (PSA) 7/21/2011    First degree atrioventricular block by electrocardiogram 8/12/2019    Hypertensive heart and kidney disease without heart failure and with stage 2 chronic kidney disease     Obesity, Class I, BMI 30-34.9     Obstructive sleep apnea on CPAP     On statin therapy due to risk of future cardiovascular event 8/14/2019    On Atorvastatin    Pure hypercholesterolemia 08/15/2019    Lipid profile (8/13/2019) showed , , HDL 42, ; Lipid profile (8/14/2019) showed , , HDL 39, LDL 94    Received intravenous tissue plasminogen activator (tPA) in emergency department 8/12/2019    Refusal of statin medication by patient 8/13/2019    As per note of Neurology (Dr. Endy Forbes), patient refuses statin agent because of potential side effects.  [facility] 8/12/2019    Type 2 diabetes mellitus with stage 2 chronic [facility] disease [facility]     HbA1c (8/13/2019) = 6.6      Patient taking anticoagulants yes Heparin, ASA   Patient has a defibrillator: no     Assessment:   Changes in Assessment throughout shift: No     Patient has central line: no Reasons if yes: Insertion date: Last dressing date:   Patient has Boykin Cath: no Reasons if yes:     Insertion date:     Last Vitals:     Vitals:    08/16/19 1037 08/16/19 1147 08/16/19 1217 08/16/19 1544   BP: (!) 183/94 158/81  158/90   Pulse: 95   88   Resp:    18   Temp:    98 °F (36.7 °C)   SpO2: 90%   96%   Weight:   102 kg (224 lb 14.4 oz)    Height:   5' 9\" (1.753 m)         PAIN    Pain Assessment    Pain Intensity 1: 0 (08/16/19 1216) Pain Intensity 1: 2 (12/29/14 1105)      Pain Location 1: Abdomen      Pain Intervention(s) 1: Medication (see MAR)  Patient Stated Pain Goal: 0 Patient Stated Pain Goal: 0  o Intervention effective: no    o Other actions taken for pain:      Skin Assessment  Skin color Skin Color: Appropriate for ethnicity  Condition/Temperature Skin Condition/Temp: Dry, Warm  Integrity Skin Integrity: Tear  Turgor Turgor: Non-tenting  Weekly Pressure Ulcer Documentation  Pressure  Injury Documentation: No Pressure Injury Noted-Pressure Ulcer Prevention Initiated  Wound Prevention & Protection Methods  Orientation of wound Orientation of Wound Prevention: Posterior  Location of Prevention Location of Wound Prevention: Sacrum/Coccyx  Dressing Present Dressing Present : No  Dressing Status Dressing Status: Intact  Wound Offloading Wound Offloading (Prevention Methods): Bed, pressure reduction mattress     INTAKE/OUPUT  Date 08/15/19 0700 - 08/16/19 0659 08/16/19 0700 - 08/17/19 0659   Shift 5899-1316 1149-1328 24 Hour Total 9387-5039 9240-6875 24 Hour Total   INTAKE   P.O. 240  240 600  600     P. O. 240  240 600  600   Shift Total(mL/kg) 240  240 600(5.9)  600(5.9)   OUTPUT   Urine  150 150        Urine Voided  150 150        Urine Occurrence(s) 0 x 3 x 3 x 5 x  5 x   Stool           Stool Occurrence(s) 0 x 0 x 0 x 0 x  0 x   Shift Total(mL/kg)  150 150       -150 90 600  600   Weight (kg)    102 102 102       Recommendations:  1. Patient needs and requests:     2. Diet: Cardiac Mech Soft Nectar Thick     3. Pending tests/procedures:      4. Functional Level/Equipment:     5. Estimated Discharge Date: 8/24/19 Posted on Whiteboard in Patients Room: yes     HEALS Safety Check    A safety check occurred in the patient's room between off going nurse and oncoming nurse listed above. The safety check included the below items  Area Items   H  High Alert Medications - Verify all high alert medication drips (heparin, PCA, etc.)   E  Equipment - Suction is set up for ALL patients (with isaccker)  - Red plugs utilized for all equipment (IV pumps, etc.)  - WOWs wiped down at end of shift.  - Room stocked with oxygen, suction, and other unit-specific supplies   A  Alarms - Bed alarm is set for fall risk patients  - Ensure chair alarm is in place and activated if patient is up in a chair   L  Lines - Check IV for any infiltration  - Boykin bag is empty if patient has a Boykin   - Tubing and IV bags are labeled   S  Safety   - Room is clean, patient is clean, and equipment is clean. - Hallways are clear from equipment besides carts.    - Fall bracelet on for fall risk patients  - Ensure room is clear and free of clutter  - Suction is set up for ALL patients (with diann)  - Hallways are clear from equipment besides carts.    - Isolation precautions followed, supplies available outside room, sign posted

## 2019-08-16 NOTE — PROGRESS NOTES
NUTRITION    Nutrition Consult: General Nutrition Management & Supplements     RECOMMENDATIONS / PLAN:     - No nutrition interventions indicated at this time. Will re-screen as appropriate. NUTRITION INTERVENTIONS & DIAGNOSIS:     Nutrition Diagnosis: No nutrition diagnosis at this time. ASSESSMENT:     Pt reports good meal intake/appetite, tolerating diet. Denied having nutritional questions or concerns during visit. BG levels stable with SSI ordered, no need for DM diet restriction at this time.     Average po intake adequate to meet patients estimated nutritional needs:   [x] Yes     [] No   [] Unable to determine at this time    Diet: DIET CARDIAC Mechanical Soft; 2 Nectar/2 Mildly Thick      Food Allergies: NKFA  Current Appetite:   [x] Good     [] Fair     [] Poor     [] Other:  Appetite/meal intake prior to admission:   [x] Good     [] Fair     [] Poor     [] Other:  Feeding Limitations:  [x] Swallowing difficulty: SLP following    [] Chewing difficulty    [] Other:  Current Meal Intake:   Patient Vitals for the past 100 hrs:   % Diet Eaten   08/16/19 0945 100 %   08/15/19 1837 180 %       BM: 8/15  Skin Integrity: WDL  Edema:   [x] No     [] Yes   Pertinent Medications: Reviewed: prn dulcolax, SSI, mag-ox, KCL    Recent Labs     08/16/19  0630 08/15/19  0330 08/14/19  1848 08/14/19  0342    145 143 145   K 3.8 3.0* 2.8* 2.7*    110 108 110   CO2 25 24 27 26   * 119* 148* 126*   BUN 26* 23* 26* 28*   CREA 1.20 1.12 1.31* 1.39*   CA 8.3* 8.2* 8.1* 8.3*   MG 1.9 2.0  --  2.2   PHOS  --  3.3  --  4.4   ALB 3.2*  --   --   --    SGOT 14  --   --   --    ALT 15*  --   --   --        Intake/Output Summary (Last 24 hours) at 8/16/2019 1218  Last data filed at 8/16/2019 0945  Gross per 24 hour   Intake 600 ml   Output 150 ml   Net 450 ml       Anthropometrics:  Ht Readings from Last 1 Encounters:   08/16/19 5' 9\" (1.753 m)     Last 3 Recorded Weights in this Encounter    08/16/19 1217 Weight: 102 kg (224 lb 14.4 oz)     Body mass index is 33.21 kg/m². Obese Class I    Weight History: Pt denies changes in weight hx PTA. Per chart -26 lbs, 10% x 4 months. Weight Metrics 8/16/2019 8/15/2019 8/15/2019 8/15/2019 6/20/2019 4/15/2019 2/8/2019   Weight 224 lb 14.4 oz - 224 lb 14.4 oz - 248 lb 250 lb 238 lb 6.4 oz   BMI - 33.21 kg/m2 - 33.21 kg/m2 36.62 kg/m2 36.92 kg/m2 35.21 kg/m2        Admitting Diagnosis: Acute ischemic stroke (HCC) [I63.9]  Pertinent PMHx: stroke, CKD, dysphagia, HTN, STEFANIE, hypercholesterolemia, DM    Education Needs:        [x] None identified  [] Identified - Not appropriate at this time  []  Identified and addressed - refer to education log   Learning Limitations:   [x] None identified  [] Identified    Cultural, Episcopal & ethnic food preferences:  [x] None identified    [] Identified and addressed     ESTIMATED NUTRITION NEEDS:     Calories: 7046-3379 kcal (MSJx1-1.2) based on  [x] Actual BW: 102 kg      [] IBW   Protein:  gm (0.8-1 gm/kg) based on  [x] Actual BW      [] IBW   Fluid: 1 mL/kcal     MONITORING & EVALUATION:     Nutrition Goal(s):   1. Po intake of meals will meet >75% of patient estimated nutritional needs within the next 7 days.   Outcome:  [] Met/Ongoing    [] Progressing towards goal    [] Not progressing towards goal    [x] New/Initial goal     Monitoring:   [x] Food and beverage intake   [x] Diet order   [x] Nutrition-focused physical findings   [x] Treatment/therapy   [] Weight   [] Enteral nutrition intake        Previous Recommendations (for follow-up assessments only):     []   Implemented       []   Not Implemented (RD to address)      [] No Longer Appropriate     [] No Recommendation Made     Discharge Planning: cardiac, diabetic mechanical soft diet with nectar thickened liquids  [x] Participated in care planning, discharge planning, & interdisciplinary rounds as appropriate      Orlando Beverly RD   Pager: [phone]

## 2019-08-16 NOTE — PROGRESS NOTES
Problem: Self Care Deficits Care Plan (Adult)  Goal: *Therapy Goal (Edit Goal, Insert Text)  Description  Occupational Therapy Goals   Long Term Goals  Initiated 2019 and to be accomplished within 4 week(s)  1. Pt will perform self-feeding with MI.  2. Pt will perform grooming with Set-up. 3. Pt will perform UB bathing with SBA. 4. Pt will perform LB bathing with Luba/CGA. 5. Pt will perform tub/shower transfer with Luba/CGA using LRAD. 6. Pt will perform UB dressing with Set-up. 7. Pt will perform LB dressing with Luba/CGA. 8. Pt will perform toileting task with Luba/CGA. 9. Pt will perform toilet transfer with Luba/CGA using LRAD. Short Term Goals   Initiated 2019 and to be accomplished within 7 day(s) (2019)  1. Pt will perform self-feeding with S; using compensatory strategies for right hand incorporation into set-up. 2. Pt will perform simple grooming task, including denture mgmt, with Luba and compensatory strategies as needed. 3. Pt will perform UB bathing with Luba using AE as needed. 4. Pt will perform LB bathing with ModA using AE and compensatory strategies as needed. 5. Pt will perform tub/shower transfer with modA x 1 <>TTB. 6. Pt will perform UB dressing with CGA. 7. Pt will perform LB dressing with ModA using AE as needed. 8. Pt will perform toileting task with ModA. 9. Pt will perform toilet transfer with ModA x 1 <>3:1 commode. Outcome: Progressing Towards Goal   OCCUPATIONAL THERAPY EVALUATION    Patient: Sumeet July 71 y.o.   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     On Allopurinol    Chronic hypokalemia     Persistent chronic hypokalemia + hypertension; ?primary hyperaldosteronism    CKD (chronic kidney disease) stage 2, GFR 60-89 ml/min 8/15/2019    Current use of aspirin 8/14/2019    Dysarthria 8/12/2019    Dysphagia 8/12/2019    Elevated prostate specific antigen (PSA) 7/21/2011    First degree atrioventricular block by electrocardiogram 8/12/2019    Hypertensive heart and kidney disease without heart failure and with stage 2 chronic kidney disease     Obesity, Class I, BMI 30-34.9     Obstructive sleep apnea on CPAP     On statin therapy due to risk of future cardiovascular event 8/14/2019    On Atorvastatin    Pure hypercholesterolemia 08/15/2019    Lipid profile (8/13/2019) showed , , HDL 42, ; Lipid profile (8/14/2019) showed , , HDL 39, LDL 94    Received intravenous tissue plasminogen activator (tPA) in emergency department 8/12/2019    Refusal of statin medication by patient 8/13/2019    As per note of Neurology (Dr. Chato Rincon), patient refuses statin agent because of potential side effects.  Right hemiparesis ([facility] [address] 8/12/2019    Type 2 diabetes mellitus with stage 2 chronic kidney disease (HCC)     HbA1c (8/13/2019) = 6.6     Precautions: Fall    Time In: 0930  Time Out[de-identified] 1108    Pain:  Pt reports 0/10 pain or discomfort prior to treatment. Pt reports 0/10 pain or discomfort post treatment. 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     Pt agreeable and appearing motivated t/o OT eval, reporting motivation to return to [facility] and to [facility] again. \"    OBJECTIVE DATA SUMMARY:   Pt approached for evaluation seated in w/c in room, pt reporting having to toilet and requesting assistance to transfer to 3:1 commode. Pt introduced to role and purpose/process of occupational therapy evaluation with pt demo'ing understanding.  Additional staff member present for functional transfer safety and assist with initial sit<>stands. Pt performed bathing at sink this AM . Pt's vitals assessed during bathing 2/2 to staff report pt's blood pressure has been elevated (see below). Pt provided with thickened liquid at end of bathing task during rest break. Pt's spouse present at end of session. Pt provided with arm tray for w/c to encourage right UE attention and decreased edema noted in pt's right dorsum of hand. Patient Vitals for the past 8 hrs:   Temp Pulse Resp BP SpO2   08/16/19 1037 -- 95 -- (!) 183/94 90 %   08/16/19 1022 -- 82 -- 168/88 96 %         ? Right hand dominant   ? Left hand dominant    Therapy Diagnosis:   Difficulty with ADLs  ? Difficulty with functional transfers  ? Difficulty with ambulation  ? Difficulty with IADLs  ? Problem List:    Decreased strength R UE  ? Decreased strength trunk/core  ? Decreased AROM  R UE  ? Decreased endurance  ? Decreased balance sitting  ? Decreased balance standing  ? Pain   ? Decreased PROM  ? Functional Limitations:   Decreased independence with ADL  ? Decreased independence with functional transfers  ? Decreased independence with ambulation  ? Decreased independence with IADL  ?        Previous Functional Level: Pre-Morbid Level of Function: Pt reporting (I) in ADL/IADL w/o use of AD PTA    Home Environment: Home Situation  Home Environment: Private residence  # Steps to Enter: 2  Rails to Enter: No  One/Two Story Residence: One story  Living Alone: No  Support Systems: Spouse/Significant Other/Partner  Patient Expects to be Discharged to[de-identified] Private residence  Current DME Used/Available at Home: None  Tub or Shower Type: Tub/Shower combination    Barriers to Learning/Limitations: yes;  sensory deficits-vision/speech  Compensate with: visual, verbal, tactile, kinesthetic cues/model    Outcome Measures: FIM, MMT     MMT Initial Assessment   Right Upper Extremity Left Upper Extremity    UE AROM Pt unable to perform AROM 2/2 to hemiparesis. PROM assessed and WFL w/o pain noted in all planes. WFL AROM; 4/5 gross MMT   Shoulder flexion 0/5    Shoulder extension 0/5    Shoulder ABDuction 0/5    Shoulder ADDUction 0/5    Elbow Flexion 0/5    Elbow Extension 0/5    Wrist Extension/Flexion 0/5     0/5 WFL    0/5 No palpable muscle contraction  1/5 Palpable muscle contraction, no joint movement  2-/5 Less than full range of motion in gravity eliminated position  2/5 Able to complete full range of motion in gravity eliminated position  2+/5 Able to initiate movement against gravity  3-/5 More than half but not full range of motion against gravity  3/5 Able to complete full range of motion against gravity  3+/5 Completes full range of motion against gravity with minimal resistance  4-/5 Completes full range of motion against gravity with minimal resistance  4/5 Completes full range of motion against gravity with moderate resistance  5/5 Completes full range of motion against gravity with maximum resistance    Sensation: Intact (B) UEs per light touch assessment screen   Coordination: Intact left UE digit:digit and finger<>nose. Decreased coordination 2/2 to gross motor hemiparesis in right UE     FIM SCORES Initial Assessment   Bladder - level of assist 5   Bladder - accident frequency score 6   Bowel - level of assist 2   Bowel - accident frequency score 6   Please see IRC Interdisciplinary Eval: Coordination/Balance Section for details regarding FIM score description.     COGNITION/PERCEPTION Initial Assessment   Reading Status Literate   Writing Status WFL   Arousal/Alertness Generalized responses   Orientation Level Oriented X4   Visual Fields (WFL visual tracking and saccades)   Praxis Impaired(Right UE 2/2 to hemiparesis )   Body Scheme Cues to attend to right side of body, Cues to maintain midline in sitting, Cues to maintain midline in standing, Tactile     COMPREHENSION MODE Initial Assessment   Primary Mode of Comprehension Auditory   Hearing Aide     Corrective Lenses Reading glasses   Score 4     EXPRESSION Initial Assessment   Primary Mode of Expression Verbal   Score 4   Comments Pt requiring increased time and repetitions to make needs known 2/2 to facial droop impeding expression      SOCIAL INTERACTION/PRAGMATICS Initial Assessment   Score 7   Comments Pt interacting appropriately with OT      PROBLEM SOLVING Initial Assessment   Score 4   Comments Pt requiring Michael for problem solving body mechanics prior to toileting transfer      MEMORY Initial Assessment   Score 4   Comments       EATING Initial Assessment   Functional Level 5     Comments Pt reporting requiring set-up of container mgmt for self feeding. Pt able to use dominant left hand to manage utensils. GROOMING Initial Assessment   Functional Level 3    Oral Hygiene FIM: 4 (CGA)   Tasks completed by patient Brushed teeth, Washed face   Comments Pt req modA for cleaning and managing dentures. Pt able to wash face with set-up. BATHING Initial Assessment   Functional Level 2   Body parts patient bathed Abdomen, Arm, right, Buttocks, Chest, Martha area, Thigh, left, Thigh, right   Comments UB: modA LB: MaxA overall. Pt provided with LH sponge to increase (I) in LB bathing and decrease cranium pressure from forward flexion. Pt provided with AE following assessment without use of equipment, demo'ing increased difficulty. TUB/SHOWER TRANSFER INDEPENDENCE Initial Assessment   Score 0   Comments NT 2/2 to safety and decreased functional transfer (I). Bathing performed at sink this AM     UPPER BODY DRESSING/UNDRESSING Initial Assessment   Functional Level 4   Items applied/Steps completed Pullover (4 steps)   Comments Pt educated on karthik technique to cecily shirt with Michael overall. Pt doffed shirt with SBA.         LOWER BODY DRESSING/UNDRESSING Initial Assessment   Functional Level 2    Sock and/or Shoe Management FIM: 2   Items applied/Steps completed Elastic waist pants (3 steps), Sock, left (1 step), Sock, right (1 step), Underpants (3 steps)   Comments Pt doffed socks with modA, donned socks with TA. LB clothing score reflected from pt performing toileting task, requiring maxA for clothing mgmt      TOILETING Initial Assessment   Functional Level 2   Comments Pt able to initiate to wipe but requiring MaxA for thoroughness. Pt required maxA overall for clothing mgmt with pt initiating to assist. Pt encouraged to focus on steadying balance in standing with additional person present assisting for clothing and buttocks cleansing. TOILET TRANSFER INDEPENDENCE Initial Assessment   Transfer score 1   Comments modA x 2. Second staff member present for safety and (A) with initial sit<>stand. INSTRUMENTAL ADL Initial Assessment (PLOF)   Meal preparation Independent(Spouse, pt reporting able to make simple meals)   Homemaking Independent(Splitting tasks with spouse)   Medicine Management Independent   Financial Management Assist x1(Spouse)        ASSESSMENT :  Based on the objective data described above, pt is a pleasant gentleman admitted to ARU following CVA with residual hemiparesis in the right UE, dysarthria, and dysphagia. The patient presents with decreased (I) in ADL/IADL tasks, weakness and decreased gross motor initiation in right UE , decreased functional use in non-dominant right UE ,  decreased dynamic sitting balance, and decreased static/dynamic standing balance. Pt would benefit from education on AE such as LH sponge, karthik sock aide, shoe horn, and compensatory strategies to incorporate right UE into functional tasks. Pt would benefit from skilled occupational therapy in order to improve independent functional mobility/ADLs,/IADLs within the home.  Interventions may include range of motion (AROM, PROM B UE), motor function (B UE/ strengthening/coordination), activity tolerance (vitals, oxygen saturation levels), balance training, ADL/IADL training and functional transfer training. Please see IRC; Interdisciplinary Eval, Care Plan, and Patient Education for further information regarding occupational therapy examination and plan of care. PLAN :  Recommendations and Planned Interventions:  ?               Self Care Training                   ? Therapeutic Activities  ? Functional Mobility Training    ? Cognitive Retraining  ? Therapeutic Exercises            ? Endurance Activities  ? Balance Training                     ? Neuromuscular Re-Education  ? Visual/Perceptual Training      ? Home Safety Training  ? Patient Education                    ? Family Training/Education  ? Other (comment):    Frequency/Duration: Patient will be followed by occupational therapy 1-2 times per day/4-7 days per week to address goals. Discharge Recommendations: Home Health  Further Equipment Recommendations for Discharge: TTB, 3:1     Please refer to the flow sheet for vital signs taken during this treatment. After treatment:   ? Patient left in no apparent distress sitting up in wheel chair for PT hand off   ? Spouse present      COMMUNICATION/EDUCATION:   ? Home safety education was provided and the patient/caregiver indicated understanding. ? Patient/family have participated as able in goal setting and plan of care. ? Patient/family agree to work toward stated goals and plan of care. ? Patient understands intent and goals of therapy, but is neutral about his/her participation. ? Patient is unable to participate in goal setting and plan of care. Order received from MD for occupational therapy services and chart reviewed. Pt to be seen 5 times per week for 3 hours of total therapy per day for 4 weeks.   Thank you for the referral.    Thank you for this referral.  Oskar Hickman OT

## 2019-08-16 NOTE — PROGRESS NOTES
[x] Psychology  [] Social Work [] Recreational Therapy    INTERVENTION  UNITS/TIME OF SERVICE   Assessment August 16, 2019   Supportive Counseling    Orientation    Discharge Planning    Resource Linkage              Progress/Current Status    Patient seen for Psychological Evaluation as requested on admission to ARU by Dr. Roman Holt. Patient found sitting upright in wheelchair in room; he presents with significant right hemiparesis. Patient not in acute distress and no lability is observed. He further denies acute feelings of emotional distress and no lability is observed. He insists that he is motivated to improve. Patient further denies any history of psychiatric services and is not requiring psychotropic medication for stabilization. Patient has limited insight about the parameters of stroke occurrence and recovery and will benefit from education to increase his insight and in order to identify realistic goals for himself in recovery. Patient will be monitored for any emergent, emotional and/or behavioral difficulties that might impact his therapy effort.     Ky Muñiz, PHD 8/16/2019 11:41 AM

## 2019-08-16 NOTE — ROUTINE PROCESS
SHIFT CHANGE NOTE FOR [place]    Bedside and Verbal shift change report given to Maribell (oncoming nurse) by Audrey May (offgoing nurse). Report included the following information SBAR, Kardex, MAR and Recent Results. Situation:   Code Status: Full Code   Reason for Admission: CVA  Hospital Day: 1   Problem List:   Hospital Problems  Never Reviewed          Codes Class Noted POA    Hypertensive heart and kidney disease without heart failure and with stage 2 chronic kidney disease (Chronic) ICD-10-CM: I13.10, N18.2  ICD-9-CM: 404.90, 585.2  Unknown Yes        Chronic hypokalemia ICD-10-CM: E87.6  ICD-9-CM: 276.8  Unknown Yes    Overview Addendum 8/15/2019 11:31 AM by Kiesha Mckeon MD     Persistent chronic hypokalemia + hypertension; ?primary hyperaldosteronism             Type 2 diabetes mellitus with stage 2 chronic kidney disease (HCC) (Chronic) ICD-10-CM: E11.22, N18.2  ICD-9-CM: 250.40, 585.2  Unknown Yes    Overview Signed 8/15/2019 12:26 PM by Kiesha Mckeon MD     HbA1c (8/13/2019) = 6.6             Pure hypercholesterolemia (Chronic) ICD-10-CM: E78.00  ICD-9-CM: 272.0  8/15/2019 Yes    Overview Addendum 8/15/2019  6:27 PM by Kiesha Mckeon MD     Lipid profile (8/13/2019) showed , , HDL 42, ; Lipid profile (8/14/2019) showed , , HDL 39, LDL 94             Current use of aspirin ICD-10-CM: Z79.82  ICD-9-CM: V58.66  8/14/2019 Yes        On statin therapy due to risk of future cardiovascular event ICD-10-CM: Z79.899  ICD-9-CM: V58.69  8/14/2019 Yes    Overview Signed 8/15/2019 12:21 PM by Kiesha Mckeon MD     On Atorvastatin             Refusal of statin medication by patient ICD-10-CM: Z53.29  ICD-9-CM: V64.2  8/13/2019 Yes    Overview Signed 8/15/2019  2:01 PM by Kiesha Mckeon MD     As per note of Neurology (Dr. Carmine Stoddard), patient refuses statin agent because of potential side effects.               * (Principal) Acute ischemic stroke [facility]) ICD-10-CM: I63.9  ICD-9-CM: 434.91  8/12/2019 Yes    Overview Signed 8/15/2019 12:17 AM by Ami Cole MD     Acute Ischemic Stroke (acute/early subacute infarct at the left posterior basal ganglia to corona radiata) with residual right hemiparesis, dysphagia and dysarthria             Impaired mobility and ADLs ICD-10-CM: Z74.09  ICD-9-CM: 799.89  8/12/2019 Yes        Received intravenous tissue plasminogen activator (tPA) in emergency department ICD-10-CM: Z92.82  ICD-9-CM: V45.88  8/12/2019 Yes        Right hemiparesis ([facility] ICD-10-CM: G81.91  ICD-9-CM: 342.90  8/12/2019 Yes        Dysphagia ICD-10-CM: R13.10  ICD-9-CM: 787.20  8/12/2019 Yes        Dysarthria ICD-10-CM: R47.1  ICD-9-CM: 784.51  8/12/2019 Yes              Background:   Past Medical History:   Past Medical History:   Diagnosis Date    Acute ischemic stroke ([facility] [address] 8/12/2019    Acute Ischemic Stroke (acute/early subacute infarct at the left posterior basal ganglia to corona radiata) with residual right hemiparesis, dysphagia and dysarthria    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     On Allopurinol    Chronic hypokalemia     Persistent chronic hypokalemia + hypertension; ?primary hyperaldosteronism    CKD (chronic kidney disease) stage 2, GFR 60-89 ml/min 8/15/2019    Current use of aspirin 8/14/2019    Dysarthria 8/12/2019    Dysphagia 8/12/2019    Elevated prostate specific antigen (PSA) 7/21/2011    First degree atrioventricular block by electrocardiogram 8/12/2019    Hypertensive heart and kidney disease without heart failure and with stage 2 chronic kidney disease     Obesity, Class I, BMI 30-34.9     Obstructive sleep apnea on CPAP     On statin therapy due to risk of future cardiovascular event 8/14/2019    On Atorvastatin    Pure hypercholesterolemia 08/15/2019    Lipid profile (8/13/2019) showed , , HDL 42, ; Lipid profile (8/14/2019) showed , , HDL 39, LDL 94    Received intravenous tissue plasminogen activator (tPA) in emergency department 8/12/2019    Refusal of statin medication by patient 8/13/2019    As per note of Neurology (Dr. Mimi Silva), patient refuses statin agent because of potential side effects.  Right hemiparesis ([facility] [address] 8/12/2019    Type 2 diabetes mellitus with stage 2 chronic kidney disease ([facility] [address]     HbA1c (8/13/2019) = 6.6      Patient taking anticoagulants yes Heparin, ASA   Patient has a defibrillator: no     Assessment:   Changes in Assessment throughout shift: No     Patient has central line: no Reasons if yes: Insertion date: Last dressing date:   Patient has Boykin Cath: no Reasons if yes:     Insertion date:     Last Vitals:     Vitals:    08/15/19 1637 08/16/19 0549   BP: 192/90 (!) 192/95   Pulse: 85 75   Resp: 18    Temp: 97.7 °F (36.5 °C)    SpO2: 97% 99%        PAIN    Pain Assessment    Pain Intensity 1: 0 (08/16/19 0358) Pain Intensity 1: 2 (12/29/14 1105)      Pain Location 1: Abdomen      Pain Intervention(s) 1: Medication (see MAR)  Patient Stated Pain Goal: 0 Patient Stated Pain Goal: 0  o Intervention effective: no    o Other actions taken for pain:      Skin Assessment  Skin color Skin Color: Appropriate for ethnicity, Dusky, Ecchymosis (comment)  Condition/Temperature Skin Condition/Temp: Warm  Integrity Skin Integrity: Tear  Turgor Turgor: Non-tenting  Weekly Pressure Ulcer Documentation  Pressure  Injury Documentation: No Pressure Injury Noted-Pressure Ulcer Prevention Initiated  Wound Prevention & Protection Methods  Orientation of wound Orientation of Wound Prevention: Posterior  Location of Prevention Location of Wound Prevention: Sacrum/Coccyx  Dressing Present Dressing Present : Intact, not due to be changed  Dressing Status Dressing Status: Intact  Wound Offloading Wound Offloading (Prevention Methods): Bed, pressure redistribution/air, Bed, pressure reduction mattress     INTAKE/OUPUT  Date 08/15/19 0700 - 08/16/19 0659 08/16/19 0700 - 08/17/19 0659   Shift 4634-8134 6692-5929 24 Hour Total 9133-5937 3091-9308 24 Hour Total   INTAKE   P.O. 240  240        P. O. 240  240      Shift Total 240  240      OUTPUT   Urine  150 150        Urine Voided  150 150        Urine Occurrence(s) 0 x 3 x 3 x      Stool           Stool Occurrence(s) 0 x 0 x 0 x      Shift Total  150 150       -150 90      Weight (kg)             Recommendations:  1. Patient needs and requests:     2. Diet: Cardiac Mech Soft Nectar Thick     3. Pending tests/procedures:      4. Functional Level/Equipment:     5. Estimated Discharge Date: 8/24/19 Posted on Whiteboard in Patients Room: yes     HEALS Safety Check    A safety check occurred in the patient's room between off going nurse and oncoming nurse listed above. The safety check included the below items  Area Items   H  High Alert Medications - Verify all high alert medication drips (heparin, PCA, etc.)   E  Equipment - Suction is set up for ALL patients (with diann)  - Red plugs utilized for all equipment (IV pumps, etc.)  - WOWs wiped down at end of shift.  - Room stocked with oxygen, suction, and other unit-specific supplies   A  Alarms - Bed alarm is set for fall risk patients  - Ensure chair alarm is in place and activated if patient is up in a chair   L  Lines - Check IV for any infiltration  - Boykin bag is empty if patient has a Boykin   - Tubing and IV bags are labeled   S  Safety   - Room is clean, patient is clean, and equipment is clean. - Hallways are clear from equipment besides carts. - Fall bracelet on for fall risk patients  - Ensure room is clear and free of clutter  - Suction is set up for ALL patients (with diann)  - Hallways are clear from equipment besides carts.    - Isolation precautions followed, supplies available outside room, sign posted

## 2019-08-17 LAB
ANION GAP SERPL CALC-SCNC: 9 MMOL/L (ref 3–18)
BUN SERPL-MCNC: 40 MG/DL (ref 7–18)
BUN/CREAT SERPL: 29 (ref 12–20)
CALCIUM SERPL-MCNC: 7.9 MG/DL (ref 8.5–10.1)
CHLORIDE SERPL-SCNC: 110 MMOL/L (ref 100–111)
CO2 SERPL-SCNC: 24 MMOL/L (ref 21–32)
CREAT SERPL-MCNC: 1.39 MG/DL (ref 0.6–1.3)
GLUCOSE BLD STRIP.AUTO-MCNC: 127 MG/DL (ref 70–110)
GLUCOSE BLD STRIP.AUTO-MCNC: 140 MG/DL (ref 70–110)
GLUCOSE BLD STRIP.AUTO-MCNC: 140 MG/DL (ref 70–110)
GLUCOSE BLD STRIP.AUTO-MCNC: 153 MG/DL (ref 70–110)
GLUCOSE SERPL-MCNC: 121 MG/DL (ref 74–99)
POTASSIUM SERPL-SCNC: 3.5 MMOL/L (ref 3.5–5.5)
SODIUM SERPL-SCNC: 143 MMOL/L (ref 136–145)

## 2019-08-17 PROCEDURE — 74011250637 HC RX REV CODE- 250/637: Performed by: INTERNAL MEDICINE

## 2019-08-17 PROCEDURE — 82962 GLUCOSE BLOOD TEST: CPT

## 2019-08-17 PROCEDURE — 97110 THERAPEUTIC EXERCISES: CPT

## 2019-08-17 PROCEDURE — 80048 BASIC METABOLIC PNL TOTAL CA: CPT

## 2019-08-17 PROCEDURE — 97530 THERAPEUTIC ACTIVITIES: CPT

## 2019-08-17 PROCEDURE — 97112 NEUROMUSCULAR REEDUCATION: CPT

## 2019-08-17 PROCEDURE — 74011250636 HC RX REV CODE- 250/636: Performed by: INTERNAL MEDICINE

## 2019-08-17 PROCEDURE — 36415 COLL VENOUS BLD VENIPUNCTURE: CPT

## 2019-08-17 PROCEDURE — 65310000000 HC RM PRIVATE REHAB

## 2019-08-17 PROCEDURE — 97535 SELF CARE MNGMENT TRAINING: CPT

## 2019-08-17 PROCEDURE — 74011636637 HC RX REV CODE- 636/637: Performed by: INTERNAL MEDICINE

## 2019-08-17 RX ADMIN — INSULIN LISPRO 2 UNITS: 100 INJECTION, SOLUTION INTRAVENOUS; SUBCUTANEOUS at 17:04

## 2019-08-17 RX ADMIN — AMLODIPINE BESYLATE 10 MG: 10 TABLET ORAL at 08:41

## 2019-08-17 RX ADMIN — POTASSIUM CHLORIDE 40 MEQ: 1.5 POWDER, FOR SOLUTION ORAL at 08:40

## 2019-08-17 RX ADMIN — ASPIRIN 81 MG 81 MG: 81 TABLET ORAL at 08:41

## 2019-08-17 RX ADMIN — ATORVASTATIN CALCIUM 80 MG: 40 TABLET, FILM COATED ORAL at 21:13

## 2019-08-17 RX ADMIN — HYDRALAZINE HYDROCHLORIDE 75 MG: 50 TABLET, FILM COATED ORAL at 21:14

## 2019-08-17 RX ADMIN — LABETALOL HYDROCHLORIDE 200 MG: 100 TABLET, FILM COATED ORAL at 08:41

## 2019-08-17 RX ADMIN — HEPARIN SODIUM 5000 UNITS: 5000 INJECTION INTRAVENOUS; SUBCUTANEOUS at 06:23

## 2019-08-17 RX ADMIN — ISOSORBIDE DINITRATE 10 MG: 10 TABLET ORAL at 21:13

## 2019-08-17 RX ADMIN — LABETALOL HYDROCHLORIDE 200 MG: 100 TABLET, FILM COATED ORAL at 21:14

## 2019-08-17 RX ADMIN — HYDRALAZINE HYDROCHLORIDE 75 MG: 50 TABLET, FILM COATED ORAL at 06:21

## 2019-08-17 RX ADMIN — ISOSORBIDE DINITRATE 10 MG: 10 TABLET ORAL at 14:51

## 2019-08-17 RX ADMIN — HYDRALAZINE HYDROCHLORIDE 75 MG: 50 TABLET, FILM COATED ORAL at 14:50

## 2019-08-17 RX ADMIN — HEPARIN SODIUM 5000 UNITS: 5000 INJECTION INTRAVENOUS; SUBCUTANEOUS at 21:34

## 2019-08-17 RX ADMIN — Medication 400 MG: at 08:41

## 2019-08-17 RX ADMIN — ISOSORBIDE DINITRATE 10 MG: 10 TABLET ORAL at 06:21

## 2019-08-17 RX ADMIN — HEPARIN SODIUM 5000 UNITS: 5000 INJECTION INTRAVENOUS; SUBCUTANEOUS at 14:48

## 2019-08-17 NOTE — PROGRESS NOTES
conducted an initial consultation and Spiritual Assessment for Anand Eric, who is a 71 y. o.,male. Patients Primary Language is: [place]. According to the patients EMR Spiritism Affiliation is: [facility].     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     Current use of aspirin 08/14/2019    On statin therapy due to risk of future cardiovascular event 08/14/2019    Refusal of statin medication by patient 08/13/2019    Acute ischemic stroke ([facility] 08/12/2019    Impaired mobility and ADLs 08/12/2019    Received intravenous tissue plasminogen activator (tPA) in emergency department 08/12/2019    [facility]is [facility] 08/12/2019    Dysphagia 08/12/2019    Dysarthria 08/12/2019    First degree atrioventricular block by electrocardiogram 08/12/2019    Obesity, Class I, BMI 30-34.9     Elevated prostate specific antigen (PSA) 07/21/2011        The  provided the following Interventions:  Initiated a relationship of care and support. Explored issues of ana, belief, spirituality and Lutheran/ritual needs while hospitalized. Listened empathically. Provided chaplaincy education. Provided information about Spiritual Care Services. Offered assurance of continued prayers on patient's behalf. Chart reviewed. The following outcomes where achieved:  Patient shared limited information about  their medical narrative. Patient processed feeling about current hospitalization.   Patient expressed gratitude for 's visit. Assessment:  Patient does not have any Jehovah's witness/cultural needs that will affect patients preferences in health care. There are no spiritual or Jehovah's witness issues which require intervention at this time. Plan:  Chaplains will continue to follow and will provide pastoral care on an as needed/requested basis.  recommends bedside caregivers page  on duty if patient shows signs of acute spiritual or emotional distress.         [id number] [facility].   [phone]

## 2019-08-17 NOTE — PROGRESS NOTES
Problem: Self Care Deficits Care Plan (Adult)  Goal: *Therapy Goal (Edit Goal, Insert Text)  Description  Occupational Therapy Goals   Long Term Goals  Initiated 2019 and to be accomplished within 4 week(s)  1. Pt will perform self-feeding with MI.  2. Pt will perform grooming with Set-up. 3. Pt will perform UB bathing with SBA. 4. Pt will perform LB bathing with Luba/CGA. 5. Pt will perform tub/shower transfer with Luba/CGA using LRAD. 6. Pt will perform UB dressing with Set-up. 7. Pt will perform LB dressing with Luba/CGA. 8. Pt will perform toileting task with Luba/CGA. 9. Pt will perform toilet transfer with Luba/CGA using LRAD. Short Term Goals   Initiated 2019 and to be accomplished within 7 day(s) (2019)  1. Pt will perform self-feeding with S; using compensatory strategies for right hand incorporation into set-up. 2. Pt will perform simple grooming task, including denture mgmt, with Luba and compensatory strategies as needed. 3. Pt will perform UB bathing with Luba using AE as needed. 4. Pt will perform LB bathing with ModA using AE and compensatory strategies as needed. 5. Pt will perform tub/shower transfer with modA x 1 <>TTB. 6. Pt will perform UB dressing with CGA. 7. Pt will perform LB dressing with ModA using AE as needed. 8. Pt will perform toileting task with ModA. 9. Pt will perform toilet transfer with ModA x 1 <>3:1 commode. Outcome: Progressing Towards Goal   OCCUPATIONAL THERAPY TREATMENT    Patient: Almeda Dance   71 y.o. Patient identified with name and : yes    Date: 2019    First Tx Session  Time In: 0930  Time Out[de-identified] 1033    Diagnosis: Acute ischemic stroke [facility]) [I63.9]   Precautions: Aspiration, NWB  Chart, occupational therapy assessment, plan of care, and goals were reviewed. Pain:  Pt reports no c/o pain or discomfort prior to treatment. Pt reports no c/o pain or discomfort post treatment.    Intervention Provided: NA      SUBJECTIVE:   Patient stated Is it normal for my arm to feel heavier today?     OBJECTIVE DATA SUMMARY:   Pt approached for tx seated on commode, finishing toileting. Pt performed sit>stand with grab bar assist with min-modA with pt instructed to maintain static standing balance as OT assisted with perineal hygiene. Pt performed stand>w/c stand pivot transfer, requiring OT (A) for left LE movement. Pt provided with bariatric 3:1 commode 2/2 to OT and pt noting discomfort with smaller seat. Pt brought to therapy gym for remainder of session. Pt presenting with right pec major muscle fasciculations, reporting is has been on going on/off since yesterday. THERAPEUTIC ACTIVITY Daily Assessment   Pt performed theract to maintain right UE god jt integrity, continued ROM, and home exercise carryover necessary for incr (I) and function of the right UE in self care. Pt provided with right UE SROM hand out to ensure good carryover of exercises to perform out of therapy times. Pt provided with full length mirror for visual feedback. OT did visual demo and provided tactile assist for good hand placement with pt performing exercises. SROM targeting: shldr flexion/ext, wrist flex/ext, supination/pronation, wrist flex/ext, elbow flex/ext, and shldr abd/adduction. Pt demo'ed good carryover of exercises, requiring min cues to slow down movements. Pt performed x 8 exercises 2 x 10. NMRE Daily Assessment   Pt performed NMRE to facilitate neuroplasticity and gross motor coordination feedback for increased function and use of the right UE. Pt performed AAROM via skate for right UE. Performed elbow flexion/abduction on lowered table, performing elbow flexion 0-80 degrees x 20 repetitions. OT noting trace motor initiation of bicep, tricep, and pectoralis major during task. Pt demo'ed increased accuracy with movement with increased trials.       GROOMING Daily Assessment     Pt washed hands at sink at w/c level, requiring OT (A) with right UE mgmt and pt providing proprioceptive tactile input via washing/drying (B) hands. TOILETING Daily Assessment    Toileting  Toileting Assistance (FIM Score): 2 (Maximal assistance)  Cues: Physical assistance for pants up; Tactile cues provided;Verbal cues provided  Adaptive Equipment: Elevated seat;Grab bars       MOBILITY/TRANSFERS Daily Assessment    Functional Transfers  Toilet Transfer : Stand pivot transfer without device  Amount of Assistance Required: 3 (Moderate assistance)       ASSESSMENT:  Pt demo'ing increased motor coordination activation with right UE as evidenced by demo'ing trace muscle activation in 3x muscle groups noted during AAROM. Pt demo'ing good carryover of SROM home exercises, requiring cues to slow pace to increase safety of limb and jt integrity. Progression toward goals:  ?          Improving appropriately and progressing toward goals  ? Improving slowly and progressing toward goals  ? Not making progress toward goals and plan of care will be adjusted     PLAN:  Patient continues to benefit from skilled intervention to address the above impairments. Continue treatment per established plan of care. Discharge Recommendations:  Home Health with 24 hr supervision and assist   Further Equipment Recommendations for Discharge:  bedside commode and transfer bench     Activity Tolerance:  Fair     Estimated LOS: TBD, recommended 4 weeks     Please refer to the flowsheet for vital signs taken during this treatment. After treatment:   ?  Patient left in no apparent distress sitting up in chair   ? Call bell left within reach  ? Caregiver present  ? Chair alarm activated    COMMUNICATION/EDUCATION:   ? Home safety education was provided and the patient/caregiver indicated understanding. ? Patient/family have participated as able in goal setting and plan of care. ? Patient/family agree to work toward stated goals and plan of care.   ? Patient understands intent and goals of therapy, but is neutral about his/her participation. ? Patient is unable to participate in goal setting and plan of care.       Moises Arana, OT

## 2019-08-17 NOTE — PROGRESS NOTES
Problem: Mobility Impaired (Adult and Pediatric)  Goal: *Acute Goals and Plan of Care (Insert Text)  Description  Physical Therapy Short Term Goals  Initiated 2019 and to be accomplished within 14 day(s)  1. Patient will move from supine to sit and sit to supine  in bed with minimal assistance/contact guard assist.     2.  Patient will transfer from bed to chair and chair to bed with minimal/moderate assistance using the least restrictive device. 3.  Patient will perform sit to stand with minimal assistance/moderate assistance. 4.  Patient will ambulate with moderate assistance  for 10 feet with the least restrictive device. Physical Therapy Goals  Initiated 2019 and to be accomplished within 4 weeks  1. Patient will move from supine to sit and sit to supine  in bed with supervision/set-up. 2.  Patient will transfer from bed to chair and chair to bed with supervision/set-up using the least restrictive device. 3.  Patient will perform sit to stand with supervision/set-up. 4.  Patient will ambulate with minimal assistance for 50 feet with the least restrictive device. 5.  Patient will ascend/descend 2 stairs with 1 handrail(s) with moderate assistance . Outcome: Progressing Towards Goal   PHYSICAL THERAPY TREATMENT    Patient: Sarah Webb (89 y.o. male)  Date: 2019  Diagnosis: Acute ischemic stroke [facility]) [I63.9] Acute ischemic stroke [facility])  Precautions: Aspiration, NWB; R karthik  Chart, physical therapy assessment, plan of care and goals were reviewed. Pain:  AM/PM: Pt pain was reported as  0 pre-treatment. Pt pain was reported as 0 post-treatment. Intervention: transfer trg., there ex., neuro re-ed  TIME IN/OUT: 7384-5842  TIME IN/OUT: 1651-5261  Patient identified with name and :  Y    SUBJECTIVE:   Pt  very pleasant. States primary goal \"want to walk. \"      OBJECTIVE DATA SUMMARY:   Objective:     BED/MAT MOBILITY Daily Assessment     AM: Sit to supine w christopher lott LE MOD A; sup to sit x 3 trials; 2/3 mod to MAX for trunk to midline, 1/3 min A. PM:  Sit to supine on mat mod A to min a with Le hooked; sup to sit MOD A; max cueing for positioning on mat in prep for transition. Req decreased A this PM for trunk to midline. Rolling supine to left sidelying VC and MIN A for positioning LE's        TRANSFERS Daily Assessment     AM:  level of assist variable; initial stand pivot WC to bed Mod to MAX A; end of session stand pivot bed to WC min to mod A. Sit to stand trials from EOB x 3 trials MOD A and VC for hand placement, scooting to EOB in prep for stand. PM:  WC > mat stand pivot and pt unable to weight shift at this time therefore returned to sitting and worked on sit <> pivot this PM WC<>mat and WC<> bed at end of visit. Multiple trials sit to stand MOD A from EOM. GAIT Daily Assessment     Pre- gait. Standing only EOB this AM  PM: pre gait standing from EOM and with mirror for visual feedback, VC look up; worked on step fwd/back with L LE 5 reps x 3 trials w seated rest break bw trials. R LE sliding  step fwd/back x 4 reps. BALANCE Daily Assessment    Sitting - Static: Good (unsupported)  Sitting - Dynamic: Fair (occasional)  Standing - Static: Fair(minus)       WHEELCHAIR MOBILITY Daily Assessment     AM:Propelling from his room to DR 40 feet with L UE/LE. PM: propelling his room <> to gym 217 ft x 2 using  L UE/LE and cues to lock brake. THERAPEUTIC EXERCISES Daily Assessment     AM:  L SLR x 10; R manual resisted hip abd/add in hooklying x 5 reps for each; B quad set x 10 w 3 sec hold, bridging x 10; place/ hold R LE in 90/90 position x 5 reps, R SLR x 10 w approx 30 deg ext lag, AA   hip and knee F/E x 10. PM: Seated eom worked on isolating knee F/E (req AA) x 10 reps with washcloth under foot to decrease resistance. Standing weight shift and stepping (as above) worked on step fwd/back with L LE 5 reps x 3 trials w seated rest break bw trials. R LE sliding  step fwd/back x 4 reps. R LE diagonals off EOM for hip abd w knee F> adduct , extend knee quick stretch at ankle   5 reps x 2, bridging x 10, in left sidelying manual resist for (R) trunk F/E (carryover to bed mobility), cervical lateral flexion 5 reps x 2,  and \"clamshells\" L LE w emphasis on form and not \"over efforting. \"       Neuro Re-Education:  AM: Standing trials sit <> stand from EOB worked on minimal weight shift R<>L and A<>P with max cueing and assist for maintaining R LE extension. PM: cont PM as above. ASSESSMENT:   AM:  Presents in room  up in wc. Wife requesting assist for donning sweat pants as pt cold. Transferred WC to bed stand pivot  mod to MAX A. Worked on seated scooting  to  [facility] w VC/tactile for pelvic tilt. Sit to supine w hooked R LE and mod to min A. Bed level ex as above. /82 HR 77  PM:  Presents in room  up in wc. Supportive wife present. Provided education   this visit on stroke recovery window, need for patience, allowing for rest periods. Frequent cues for breathing as pt tends to hold breath with effort during ex. Wife VU and also reinforcing same w . Decreased assist w bed mobility this PM.  VS: 159/83 post standing activity. Progression toward goals:  ?      Improving appropriately and progressing toward goals       PLAN:  Patient continues to benefit from skilled intervention to address the above impairments. Continue treatment per established plan of care. Discharge Recommendations: To Be Determined  Further Equipment Recommendations for Discharge:  TBD     Estimated LOS: TBD    Activity Tolerance:   fair  Please refer to the flowsheet for vital signs taken during this treatment. After treatment:   AM:  Patient left  up in  [address]. Wife present and propelling to dining room  PM:  Wife in room. Pt transferred back to  Bed per his preference.        Luz Marina Dalal, PTA  8/17/2019

## 2019-08-17 NOTE — H&P
Progress Note    Patient: Krystal Kumar MRN: [id number]  CSN: [id number]    YOB: 1955  Age: 71 y.o. Sex: male    DOA: 8/15/2019 LOS:  LOS: 2 days                    Subjective:     Primary Rehab Impairment Category (ANGIE): Stroke     Impairment Group Label: Right body involvement (left brain)     Etiologic Diagnosis: Acute Ischemic Stroke (acute/early subacute infarct at the left posterior basal ganglia to corona radiata) with residual right hemiparesis, dysphagia and dysarthria      Review of systems  General: No fevers or chills. Cardiovascular: No chest pain or pressure. No palpitations. Pulmonary: No shortness of breath, cough or wheeze. Gastrointestinal: No abdominal pain, nausea, vomiting or diarrhea. Genitourinary: No urinary frequency, urgency, hesitancy or dysuria. Musculoskeletal:  Rt arm and Rt leg weakness    Neurologic: No headache, Rt sided weakness    Objective:     Physical Exam:  Visit Vitals  /79 (BP 1 Location: Right arm, BP Patient Position: At rest)   Pulse 65   Temp 98.1 °F (36.7 °C)   Resp 19   Ht 5' 9\" (1.753 m)   Wt 102 kg (224 lb 14.4 oz)   SpO2 98%   BMI 33.21 kg/m²        General:         Alert,  no acute distress    HEENT: NC, Atraumatic. PERRLA, anicteric sclerae. Lungs: CTA Bilaterally. No Wheezing/Rhonchi/Rales. Heart:  Regular  rhythm,  No murmur, No Rubs, No Gallops  Abdomen: Soft, Non distended, Non tender. Extremities: No lower limb edema  Psych:    Not anxious or agitated. Neurologic:  CN 2-12 grossly intact, Alert and oriented X 3.   No acute neurological                                 Deficits,     Intake and Output:  Current Shift:  08/17 0701 - 08/17 1900  In: 240 [P.O.:240]  Out: -   Last three shifts:  08/15 1901 - 08/17 0700  In: 840 [P.O.:840]  Out: 725 [Urine:725]    Labs: Results:       Chemistry Recent Labs     08/17/19  0650 08/16/19  0630 08/15/19  0330   * 136* 119*    142 145   K 3.5 3.8 3.0*    107 110 CO2 24 25 24   BUN 40* 26* 23*   CREA 1.39* 1.20 1.12   CA 7.9* 8.3* 8.2*   AGAP 9 10 11   BUCR 29* 22* 21*   AP  --  188*  --    TP  --  6.5  --    ALB  --  3.2*  --    GLOB  --  3.3  --    AGRAT  --  1.0  --       CBC w/Diff Recent Labs     08/16/19  0630 08/15/19  0330 08/14/19  1848   WBC 17.9* 11.7 10.9   RBC 3.86* 3.69* 3.74*   HGB 10.7* 10.1* 10.0*   HCT 32.6* 31.3* 31.6*    266 286   GRANS 91*  --  73   LYMPH 4*  --  17*   EOS 2  --  3      Cardiac Enzymes No results for input(s): CPK, CKND1, DANY in the last 72 hours. No lab exists for component: CKRMB, TROIP   Coagulation No results for input(s): PTP, INR, APTT in the last 72 hours. No lab exists for component: INREXT    Lipid Panel Lab Results   Component Value Date/Time    Cholesterol, total 173 08/14/2019 03:42 AM    HDL Cholesterol 39 (L) 08/14/2019 03:42 AM    LDL, calculated 94 08/14/2019 03:42 AM    VLDL, calculated 40 08/14/2019 03:42 AM    Triglyceride 200 (H) 08/14/2019 03:42 AM    CHOL/HDL Ratio 4.4 08/14/2019 03:42 AM      BNP No results for input(s): BNPP in the last 72 hours.    Liver Enzymes Recent Labs     08/16/19  0630   TP 6.5   ALB 3.2*   *   SGOT 14      Thyroid Studies Lab Results   Component Value Date/Time    TSH 1.72 08/13/2019 12:05 PM          Procedures/imaging: see electronic medical records for all procedures/Xrays and details which were not copied into this note but were reviewed prior to creation of Plan    Medications:   Current Facility-Administered Medications   Medication Dose Route Frequency    isosorbide dinitrate (ISORDIL) tablet 10 mg  10 mg Oral Q8H    labetalol (NORMODYNE) tablet 200 mg  200 mg Oral Q12H    acetaminophen (TYLENOL) tablet 650 mg  650 mg Oral Q4H PRN    bisacodyl (DULCOLAX) tablet 10 mg  10 mg Oral Q48H PRN    heparin (porcine) injection 5,000 Units  5,000 Units SubCUTAneous Q8H    insulin lispro (HUMALOG) injection   SubCUTAneous TIDAC    magnesium oxide (MAG-OX) tablet 400 mg  400 mg Oral DAILY    aspirin chewable tablet 81 mg  81 mg Oral DAILY WITH BREAKFAST    amLODIPine (NORVASC) tablet 10 mg  10 mg Oral DAILY    atorvastatin (LIPITOR) tablet 80 mg  80 mg Oral QHS    hydrALAZINE (APRESOLINE) tablet 75 mg  75 mg Oral Q8H       Assessment/Plan     Principal Problem:    Acute ischemic stroke (HCC) (8/12/2019)      Overview: Acute Ischemic Stroke (acute/early subacute infarct at the left posterior       basal ganglia to corona radiata) with residual right hemiparesis,       dysphagia and dysarthria    Active Problems:    Impaired mobility and ADLs (8/12/2019)      Received intravenous tissue plasminogen activator (tPA) in emergency department (8/12/2019)      Hypertensive heart and kidney disease without heart failure and with stage 2 chronic kidney disease ()      Right hemiparesis ([address] (8/12/2019)      Dysphagia (8/12/2019)      Dysarthria (8/12/2019)      Chronic hypokalemia ()      Overview: Persistent chronic hypokalemia + hypertension; ?primary hyperaldosteronism      Current use of aspirin (8/14/2019)      On statin therapy due to risk of future cardiovascular event (8/14/2019)      Overview: On Atorvastatin      Type 2 diabetes mellitus with stage 2 chronic kidney disease (HCC) ()      Overview: HbA1c (8/13/2019) = 6.6      Pure hypercholesterolemia (8/15/2019)      Overview: Lipid profile (8/13/2019) showed , , HDL 42, ; Lipid       profile (8/14/2019) showed , , HDL 39, LDL 94      Refusal of statin medication by patient (8/13/2019)      Overview: As per note of Neurology (Dr. Sorin Garcia), patient refuses statin agent       because of potential side effects.        Leukocytosis (8/16/2019)      Overview: WBC count (8/16/2019) = 17.9      Acute Ischemic Stroke (acute/early subacute infarct at the left posterior basal ganglia to corona radiata) with residual right hemiparesis, dysphagia and dysarthria; S/P Administration of intravenous tPA (8/12/2019 - Ekta Birmingham)              Aspirin 81 mg PO once daily with breakfast              Atorvastatin 80 mg PO q HS    Type 2 diabetes mellitus with stage 2 chronic kidney disease               HbA1c (8/13/2019) = 6.6               Humalog insulin sliding scale SC TID     Question primary hyperaldosteronism  Started on spironolactone 25 mg daily  Potassium level today 3.5  F/u endocrinology consult    Hypertension  Labetalol 200 mg BID    Chronic leucocytosis   WBC 8/16 17.9  Continue to monitor lab   Work up  This admission negative       DVT prophylaxis heparin SQ.          Danish Rodriguez MD  8/17/2019 1:59 PM

## 2019-08-17 NOTE — ROUTINE PROCESS
[facility]    Bedside and Verbal shift change report given to Maribell RN (oncoming nurse) by Bibi Kraus RN   (offgoing nurse). Report included the following information SBAR, Kardex, MAR and Recent Results.     Situation:   Code Status: Full Code   Reason for Admission: CVA  Hospital Day: 2   Problem List:   Hospital Problems  Date Reviewed: 8/16/2019          Codes Class Noted POA    Leukocytosis ICD-10-CM: D72.829  ICD-9-CM: 288.60  8/16/2019 Yes    Overview Signed 8/16/2019 11:19 AM by Yuriy Kim MD     WBC count (8/16/2019) = 17.9             Hypertensive heart and kidney disease without heart failure and with stage 2 chronic kidney disease (Chronic) ICD-10-CM: I13.10, N18.2  ICD-9-CM: 404.90, 585.2  Unknown Yes        Chronic hypokalemia ICD-10-CM: E87.6  ICD-9-CM: 276.8  Unknown Yes    Overview Addendum 8/15/2019 11:31 AM by Yuriy Kim MD     Persistent chronic hypokalemia + hypertension; ?primary hyperaldosteronism             Type 2 diabetes mellitus with stage 2 chronic kidney disease (HCC) (Chronic) ICD-10-CM: E11.22, N18.2  ICD-9-CM: 250.40, 585.2  Unknown Yes    Overview Signed 8/15/2019 12:26 PM by Yuriy Kim MD     HbA1c (8/13/2019) = 6.6             Pure hypercholesterolemia (Chronic) ICD-10-CM: E78.00  ICD-9-CM: 272.0  8/15/2019 Yes    Overview Addendum 8/15/2019  6:27 PM by Yuriy Kim MD     Lipid profile (8/13/2019) showed , , HDL 42, ; Lipid profile (8/14/2019) showed , , HDL 39, LDL 94             Current use of aspirin ICD-10-CM: Z79.82  ICD-9-CM: V58.66  8/14/2019 Yes        On statin therapy due to risk of future cardiovascular event ICD-10-CM: Z79.899  ICD-9-CM: V58.69  8/14/2019 Yes    Overview Signed 8/15/2019 12:21 PM by Yuriy Kim MD     On Atorvastatin             Refusal of statin medication by patient ICD-10-CM: Z53.29  ICD-9-CM: V64.2  8/13/2019 Yes    Overview Signed 8/15/2019  2:01 PM by Christian Mcgrath MD IRENE     As per note of Neurology (Dr. Casandra Reveles), patient refuses statin agent because of potential side effects.               * (Principal) Acute ischemic stroke [facility]) ICD-10-CM: I63.9  ICD-9-CM: 434.91  8/12/2019 Yes    Overview Signed 8/15/2019 12:17 AM by Rhonda Singleton MD     Acute Ischemic Stroke (acute/early subacute infarct at the left posterior basal ganglia to corona radiata) with residual right hemiparesis, dysphagia and dysarthria             Impaired mobility and ADLs ICD-10-CM: Z74.09  ICD-9-CM: 799.89  8/12/2019 Yes        Received intravenous tissue plasminogen activator (tPA) in emergency department ICD-10-CM: Z92.82  ICD-9-CM: V45.88  8/12/2019 Yes        [facility] ICD-10-CM: G81.91  ICD-9-CM: 342.90  8/12/2019 Yes        Dysphagia ICD-10-CM: R13.10  ICD-9-CM: 787.20  8/12/2019 Yes        Dysarthria ICD-10-CM: R47.1  ICD-9-CM: 784.51  8/12/2019 Yes              Background:   Past Medical History:   Past Medical History:   Diagnosis Date    Acute ischemic stroke ([facility] 8/12/2019    Acute Ischemic Stroke (acute/early subacute infarct at the left posterior basal ganglia to corona radiata) with residual right hemiparesis, dysphagia and dysarthria    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     On Allopurinol    Chronic hypokalemia     Persistent chronic hypokalemia + hypertension; ?primary hyperaldosteronism    CKD (chronic kidney disease) stage 2, GFR 60-89 ml/min 8/15/2019    Current use of aspirin 8/14/2019    Dysarthria 8/12/2019    Dysphagia 8/12/2019    Elevated prostate specific antigen (PSA) 7/21/2011    First degree atrioventricular block by electrocardiogram 8/12/2019    Hypertensive heart and kidney disease without heart failure and with stage 2 chronic kidney disease     Obesity, Class I, BMI 30-34.9     Obstructive sleep apnea on CPAP     On statin therapy due to risk of future cardiovascular event 8/14/2019    On Atorvastatin    Pure hypercholesterolemia 08/15/2019    Lipid profile (8/13/2019) showed , , HDL 42, ; Lipid profile (8/14/2019) showed , , HDL 39, LDL 94    Received intravenous tissue plasminogen activator (tPA) in emergency department 8/12/2019    Refusal of statin medication by patient 8/13/2019    As per note of Neurology (Dr. Darryl Cardenas), patient refuses statin agent because of potential side effects.  [email or web address] 8/12/2019    Type 2 diabetes mellitus with stage 2 chronic kidney disease ([facility] [facility]     HbA1c (8/13/2019) = 6.6      Patient taking anticoagulants yes Heparin, ASA   Patient has a defibrillator: no     Assessment:   Changes in Assessment throughout shift: No     Patient has central line: no Reasons if yes: Insertion date: Last dressing date:   Patient has Boykin Cath: no Reasons if yes:     Insertion date:     Last Vitals:     Vitals:    08/16/19 1147 08/16/19 1217 08/16/19 1544 08/16/19 2137   BP: 158/81  158/90 187/80   Pulse:   88 78   Resp:   18 18   Temp:   98 °F (36.7 °C) 98.3 °F (36.8 °C)   SpO2:   96% 97%   Weight:  102 kg (224 lb 14.4 oz)     Height:  5' 9\" (1.753 m)          PAIN    Pain Assessment    Pain Intensity 1: 0 (08/17/19 0000) Pain Intensity 1: 2 (12/29/14 1105)      Pain Location 1: Abdomen      Pain Intervention(s) 1: Medication (see MAR)  Patient Stated Pain Goal: 0 Patient Stated Pain Goal: 0  o Intervention effective: no    o Other actions taken for pain:      Skin Assessment  Skin color Skin Color: Appropriate for ethnicity  Condition/Temperature Skin Condition/Temp: Dry, Warm  Integrity Skin Integrity: Tear  Turgor Turgor: Non-tenting  Weekly Pressure Ulcer Documentation  Pressure  Injury Documentation: No Pressure Injury Noted-Pressure Ulcer Prevention Initiated  Wound Prevention & Protection Methods  Orientation of wound Orientation of Wound Prevention: Posterior  Location of Prevention Location of Wound Prevention: Buttocks, Sacrum/Coccyx  Dressing Present Dressing Present : No  Dressing Status Dressing Status: Intact  Wound Offloading Wound Offloading (Prevention Methods): Bed, pressure redistribution/air     INTAKE/OUPUT  Date 08/16/19 0700 - 08/17/19 0659 08/17/19 0700 - 08/18/19 0659   Shift 7696-1982 5478-8115 24 Hour Total 4462-6549 8411-2469 24 Hour Total   INTAKE   P.O. 840  840        P. O. 840  840      Shift Total(mL/kg) 840(8.2)  840(8.2)      OUTPUT   Urine(mL/kg/hr)  300 300        Urine Voided  300 300        Urine Occurrence(s) 6 x 2 x 8 x      Stool           Stool Occurrence(s) 0 x 0 x 0 x      Shift Total(mL/kg)  300(2.9) 300(2.9)       -300 540      Weight (kg) 102 102 102 102 102 102       Recommendations:  1. Patient needs and requests:     2. Diet: Cardiac Mech Soft Nectar Thick     3. Pending tests/procedures:      4. Functional Level/Equipment:     5. Estimated Discharge Date: 8/24/19 Posted on Whiteboard in Patients Room: yes     HEALS Safety Check    A safety check occurred in the patient's room between off going nurse and oncoming nurse listed above. The safety check included the below items  Area Items   H  High Alert Medications - Verify all high alert medication drips (heparin, PCA, etc.)   E  Equipment - Suction is set up for ALL patients (with isaccker)  - Red plugs utilized for all equipment (IV pumps, etc.)  - WOWs wiped down at end of shift.  - Room stocked with oxygen, suction, and other unit-specific supplies   A  Alarms - Bed alarm is set for fall risk patients  - Ensure chair alarm is in place and activated if patient is up in a chair   L  Lines - Check IV for any infiltration  - Boykin bag is empty if patient has a Boykin   - Tubing and IV bags are labeled   S  Safety   - Room is clean, patient is clean, and equipment is clean. - Hallways are clear from equipment besides carts.    - Fall bracelet on for fall risk patients  - Ensure room is clear and free of clutter  - Suction is set up for ALL patients (with diann)  - Hallways are clear from equipment besides carts.    - Isolation precautions followed, supplies available outside room, sign posted

## 2019-08-17 NOTE — ROUTINE PROCESS
SHIFT CHANGE NOTE FOR [place]    Bedside and Verbal shift change report given to Alan Randolph RN (oncoming nurse) by Cassandra Edwards RN   (offgoing nurse). Report included the following information SBAR, Kardex, MAR and Recent Results.     Situation:   Code Status: Full Code   Reason for Admission: CVA  Hospital Day: 2   Problem List:   Hospital Problems  Date Reviewed: 8/16/2019          Codes Class Noted POA    Leukocytosis ICD-10-CM: D72.829  ICD-9-CM: 288.60  8/16/2019 Yes    Overview Signed 8/16/2019 11:19 AM by Randee Reed MD     WBC count (8/16/2019) = 17.9             Hypertensive heart and kidney disease without heart failure and with stage 2 chronic kidney disease (Chronic) ICD-10-CM: I13.10, N18.2  ICD-9-CM: 404.90, 585.2  Unknown Yes        Chronic hypokalemia ICD-10-CM: E87.6  ICD-9-CM: 276.8  Unknown Yes    Overview Addendum 8/15/2019 11:31 AM by Randee Reed MD     Persistent chronic hypokalemia + hypertension; ?primary hyperaldosteronism             Type 2 diabetes mellitus with stage 2 chronic kidney disease (HCC) (Chronic) ICD-10-CM: E11.22, N18.2  ICD-9-CM: 250.40, 585.2  Unknown Yes    Overview Signed 8/15/2019 12:26 PM by Randee Reed MD     HbA1c (8/13/2019) = 6.6             Pure hypercholesterolemia (Chronic) ICD-10-CM: E78.00  ICD-9-CM: 272.0  8/15/2019 Yes    Overview Addendum 8/15/2019  6:27 PM by Randee Reed MD     Lipid profile (8/13/2019) showed , , HDL 42, ; Lipid profile (8/14/2019) showed , , HDL 39, LDL 94             Current use of aspirin ICD-10-CM: Z79.82  ICD-9-CM: V58.66  8/14/2019 Yes        On statin therapy due to risk of future cardiovascular event ICD-10-CM: Z79.899  ICD-9-CM: V58.69  8/14/2019 Yes    Overview Signed 8/15/2019 12:21 PM by Randee Reed MD     On Atorvastatin             Refusal of statin medication by patient ICD-10-CM: Z53.29  ICD-9-CM: V64.2  8/13/2019 Yes    Overview Signed 8/15/2019  2:01 PM by Massiel Yuan, Ame Zuniga MD     As per note of Neurology (Dr. Zonia Hawley), patient refuses statin agent because of potential side effects.               * (Principal) Acute ischemic stroke [facility]) ICD-10-CM: I63.9  ICD-9-CM: 434.91  8/12/2019 Yes    Overview Signed 8/15/2019 12:17 AM by Fuentes Frank MD     Acute Ischemic Stroke (acute/early subacute infarct at the left posterior basal ganglia to corona radiata) with residual right hemiparesis, dysphagia and dysarthria             Impaired mobility and ADLs ICD-10-CM: Z74.09  ICD-9-CM: 799.89  8/12/2019 Yes        Received intravenous tissue plasminogen activator (tPA) in emergency department ICD-10-CM: Z92.82  ICD-9-CM: V45.88  8/12/2019 Yes        Right hemiparesis ([facility] [address] ICD-10-CM: G81.91  ICD-9-CM: 342.90  8/12/2019 Yes        Dysphagia ICD-10-CM: R13.10  ICD-9-CM: 787.20  8/12/2019 Yes        Dysarthria ICD-10-CM: R47.1  ICD-9-CM: 784.51  8/12/2019 Yes              Background:   Past Medical History:   Past Medical History:   Diagnosis Date    Acute ischemic stroke ([facility] [address] 8/12/2019    Acute Ischemic Stroke (acute/early subacute infarct at the left posterior basal ganglia to corona radiata) with residual right hemiparesis, dysphagia and dysarthria    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     On Allopurinol    Chronic hypokalemia     Persistent chronic hypokalemia + hypertension; ?primary hyperaldosteronism    CKD (chronic kidney disease) stage 2, GFR 60-89 ml/min 8/15/2019    Current use of aspirin 8/14/2019    Dysarthria 8/12/2019    Dysphagia 8/12/2019    Elevated prostate specific antigen (PSA) 7/21/2011    First degree atrioventricular block by electrocardiogram 8/12/2019    Hypertensive heart and kidney disease without heart failure and with stage 2 chronic kidney disease     Obesity, Class I, BMI 30-34.9     Obstructive sleep apnea on CPAP     On statin therapy due to risk of future cardiovascular event 8/14/2019    On Atorvastatin    Pure hypercholesterolemia 08/15/2019    Lipid profile (8/13/2019) showed , , HDL 42, ; Lipid profile (8/14/2019) showed , , HDL 39, LDL 94    Received intravenous tissue plasminogen activator (tPA) in emergency department 8/12/2019    Refusal of statin medication by patient 8/13/2019    As per note of Neurology (Dr. Carmine Stoddard), patient refuses statin agent because of potential side effects.  [facility]    Type 2 diabetes mellitus with stage 2 [facility]     HbA1c (8/13/2019) = 6.6      Patient taking anticoagulants yes Heparin, ASA   Patient has a defibrillator: no     Assessment:   Changes in Assessment throughout shift: No     Patient has central line: no Reasons if yes: Insertion date: Last dressing date:   Patient has Boykin Cath: no Reasons if yes:     Insertion date:     Last Vitals:     Vitals:    08/16/19 2137 08/17/19 0621 08/17/19 0738 08/17/19 1456   BP: 187/80 186/83 169/79 159/83   Pulse: 78 79 65 81   Resp: 18  19 18   Temp: 98.3 °F (36.8 °C)  98.1 °F (36.7 °C) 98.6 °F (37 °C)   SpO2: 97%  98% 99%   Weight:       Height:            PAIN    Pain Assessment    Pain Intensity 1: 0 (08/17/19 1613) Pain Intensity 1: 2 (12/29/14 1105)      Pain Location 1: Abdomen      Pain Intervention(s) 1: Medication (see MAR)  Patient Stated Pain Goal: 0 Patient Stated Pain Goal: 0  o Intervention effective: no    o Other actions taken for pain:      Skin Assessment  Skin color Skin Color: Appropriate for ethnicity  Condition/Temperature Skin Condition/Temp: Dry  Integrity Skin Integrity: Tear  Turgor Turgor: Non-tenting  Weekly Pressure Ulcer Documentation  Pressure  Injury Documentation: No Pressure Injury Noted-Pressure Ulcer Prevention Initiated  Wound Prevention & Protection Methods  Orientation of wound Orientation of Wound Prevention: Posterior  Location of Prevention Location of Wound Prevention: Sacrum/Coccyx  Dressing Present Dressing Present : No  Dressing Status Dressing Status: Intact  Wound Offloading Wound Offloading (Prevention Methods): Bed, pressure reduction mattress     INTAKE/OUPUT  Date 08/16/19 0700 - 08/17/19 0659 08/17/19 0700 - 08/18/19 0659   Shift 0467-5575 3205-8098 24 Hour Total 3961-5557 5650-8249 24 Hour Total   INTAKE   P.O. 840  840 480  480     P. O. 840  840 480  480   Shift Total(mL/kg) 840(8.2)  840(8.2) 480(4.7)  480(4.7)   OUTPUT   Urine(mL/kg/hr)  575(0.5) 575(0.2)        Urine Voided  575 575        Urine Occurrence(s) 6 x 4 x 10 x 6 x  6 x   Stool           Stool Occurrence(s) 0 x 0 x 0 x 1 x  1 x   Shift Total(mL/kg)  575(5.6) 575(5.6)       -575 265 480  480   Weight (kg) 102 102 102 102 102 102       Recommendations:  1. Patient needs and requests:     2. Diet: Cardiac Mech Soft Nectar Thick     3. Pending tests/procedures:      4. Functional Level/Equipment:     5. Estimated Discharge Date: 8/24/19 Posted on Whiteboard in Patients Room: yes     HEALS Safety Check    A safety check occurred in the patient's room between off going nurse and oncoming nurse listed above. The safety check included the below items  Area Items   H  High Alert Medications - Verify all high alert medication drips (heparin, PCA, etc.)   E  Equipment - Suction is set up for ALL patients (with yanker)  - Red plugs utilized for all equipment (IV pumps, etc.)  - WOWs wiped down at end of shift.  - Room stocked with oxygen, suction, and other unit-specific supplies   A  Alarms - Bed alarm is set for fall risk patients  - Ensure chair alarm is in place and activated if patient is up in a chair   L  Lines - Check IV for any infiltration  - Boykin bag is empty if patient has a Boykin   - Tubing and IV bags are labeled   S  Safety   - Room is clean, patient is clean, and equipment is clean. - Hallways are clear from equipment besides carts.    - Fall bracelet on for fall risk patients  - Ensure room is clear and free of clutter  - Suction is set up for ALL patients (with diann)  - Hallways are clear from equipment besides carts.    - Isolation precautions followed, supplies available outside room, sign posted

## 2019-08-18 LAB
ALBUMIN SERPL-MCNC: 2.9 G/DL (ref 3.4–5)
ALBUMIN/GLOB SERPL: 0.9 {RATIO} (ref 0.8–1.7)
ALP SERPL-CCNC: 146 U/L (ref 45–117)
ALT SERPL-CCNC: 15 U/L (ref 16–61)
ANION GAP SERPL CALC-SCNC: 9 MMOL/L (ref 3–18)
AST SERPL-CCNC: 14 U/L (ref 10–38)
BASOPHILS # BLD: 0 K/UL (ref 0–0.1)
BASOPHILS NFR BLD: 0 % (ref 0–2)
BILIRUB SERPL-MCNC: 0.4 MG/DL (ref 0.2–1)
BUN SERPL-MCNC: 37 MG/DL (ref 7–18)
BUN/CREAT SERPL: 29 (ref 12–20)
CALCIUM SERPL-MCNC: 8.3 MG/DL (ref 8.5–10.1)
CALCIUM SERPL-MCNC: 8.6 MG/DL (ref 8.5–10.1)
CHLORIDE SERPL-SCNC: 110 MMOL/L (ref 100–111)
CO2 SERPL-SCNC: 24 MMOL/L (ref 21–32)
CREAT SERPL-MCNC: 1.29 MG/DL (ref 0.6–1.3)
DIFFERENTIAL METHOD BLD: ABNORMAL
EOSINOPHIL # BLD: 0.3 K/UL (ref 0–0.4)
EOSINOPHIL NFR BLD: 3 % (ref 0–5)
ERYTHROCYTE [DISTWIDTH] IN BLOOD BY AUTOMATED COUNT: 14.2 % (ref 11.6–14.5)
GLOBULIN SER CALC-MCNC: 3.2 G/DL (ref 2–4)
GLUCOSE BLD STRIP.AUTO-MCNC: 119 MG/DL (ref 70–110)
GLUCOSE BLD STRIP.AUTO-MCNC: 120 MG/DL (ref 70–110)
GLUCOSE BLD STRIP.AUTO-MCNC: 122 MG/DL (ref 70–110)
GLUCOSE BLD STRIP.AUTO-MCNC: 144 MG/DL (ref 70–110)
GLUCOSE SERPL-MCNC: 105 MG/DL (ref 74–99)
HCT VFR BLD AUTO: 27.4 % (ref 36–48)
HGB BLD-MCNC: 8.7 G/DL (ref 13–16)
LYMPHOCYTES # BLD: 1.7 K/UL (ref 0.9–3.6)
LYMPHOCYTES NFR BLD: 15 % (ref 21–52)
MCH RBC QN AUTO: 26.8 PG (ref 24–34)
MCHC RBC AUTO-ENTMCNC: 31.8 G/DL (ref 31–37)
MCV RBC AUTO: 84.3 FL (ref 74–97)
MONOCYTES # BLD: 0.9 K/UL (ref 0.05–1.2)
MONOCYTES NFR BLD: 8 % (ref 3–10)
NEUTS SEG # BLD: 8.7 K/UL (ref 1.8–8)
NEUTS SEG NFR BLD: 74 % (ref 40–73)
PLATELET # BLD AUTO: 272 K/UL (ref 135–420)
PMV BLD AUTO: 11.6 FL (ref 9.2–11.8)
POTASSIUM SERPL-SCNC: 3.7 MMOL/L (ref 3.5–5.5)
PROT SERPL-MCNC: 6.1 G/DL (ref 6.4–8.2)
PTH-INTACT SERPL-MCNC: 101.7 PG/ML (ref 18.4–88)
RBC # BLD AUTO: 3.25 M/UL (ref 4.7–5.5)
SODIUM SERPL-SCNC: 143 MMOL/L (ref 136–145)
WBC # BLD AUTO: 11.6 K/UL (ref 4.6–13.2)

## 2019-08-18 PROCEDURE — 74011250637 HC RX REV CODE- 250/637: Performed by: INTERNAL MEDICINE

## 2019-08-18 PROCEDURE — 65310000000 HC RM PRIVATE REHAB

## 2019-08-18 PROCEDURE — 82962 GLUCOSE BLOOD TEST: CPT

## 2019-08-18 PROCEDURE — 97530 THERAPEUTIC ACTIVITIES: CPT

## 2019-08-18 PROCEDURE — 74011250636 HC RX REV CODE- 250/636: Performed by: INTERNAL MEDICINE

## 2019-08-18 PROCEDURE — 83921 ORGANIC ACID SINGLE QUANT: CPT

## 2019-08-18 PROCEDURE — 80053 COMPREHEN METABOLIC PANEL: CPT

## 2019-08-18 PROCEDURE — 83970 ASSAY OF PARATHORMONE: CPT

## 2019-08-18 PROCEDURE — 85025 COMPLETE CBC W/AUTO DIFF WBC: CPT

## 2019-08-18 PROCEDURE — 97110 THERAPEUTIC EXERCISES: CPT

## 2019-08-18 PROCEDURE — 36415 COLL VENOUS BLD VENIPUNCTURE: CPT

## 2019-08-18 PROCEDURE — 97112 NEUROMUSCULAR REEDUCATION: CPT

## 2019-08-18 RX ORDER — HYDRALAZINE HYDROCHLORIDE 25 MG/1
25 TABLET, FILM COATED ORAL
Status: DISCONTINUED | OUTPATIENT
Start: 2019-08-18 | End: 2019-09-05 | Stop reason: HOSPADM

## 2019-08-18 RX ADMIN — HEPARIN SODIUM 5000 UNITS: 5000 INJECTION INTRAVENOUS; SUBCUTANEOUS at 06:36

## 2019-08-18 RX ADMIN — ASPIRIN 81 MG 81 MG: 81 TABLET ORAL at 08:45

## 2019-08-18 RX ADMIN — LABETALOL HYDROCHLORIDE 200 MG: 100 TABLET, FILM COATED ORAL at 21:08

## 2019-08-18 RX ADMIN — HEPARIN SODIUM 5000 UNITS: 5000 INJECTION INTRAVENOUS; SUBCUTANEOUS at 21:36

## 2019-08-18 RX ADMIN — LABETALOL HYDROCHLORIDE 200 MG: 100 TABLET, FILM COATED ORAL at 08:45

## 2019-08-18 RX ADMIN — ISOSORBIDE DINITRATE 10 MG: 10 TABLET ORAL at 06:37

## 2019-08-18 RX ADMIN — AMLODIPINE BESYLATE 10 MG: 10 TABLET ORAL at 08:45

## 2019-08-18 RX ADMIN — HYDRALAZINE HYDROCHLORIDE 75 MG: 50 TABLET, FILM COATED ORAL at 06:38

## 2019-08-18 RX ADMIN — ISOSORBIDE DINITRATE 10 MG: 10 TABLET ORAL at 21:09

## 2019-08-18 RX ADMIN — ATORVASTATIN CALCIUM 80 MG: 40 TABLET, FILM COATED ORAL at 21:07

## 2019-08-18 RX ADMIN — HEPARIN SODIUM 5000 UNITS: 5000 INJECTION INTRAVENOUS; SUBCUTANEOUS at 14:45

## 2019-08-18 RX ADMIN — ISOSORBIDE DINITRATE 10 MG: 10 TABLET ORAL at 14:58

## 2019-08-18 RX ADMIN — HYDRALAZINE HYDROCHLORIDE 75 MG: 50 TABLET, FILM COATED ORAL at 21:08

## 2019-08-18 RX ADMIN — Medication 400 MG: at 08:45

## 2019-08-18 RX ADMIN — HYDRALAZINE HYDROCHLORIDE 75 MG: 50 TABLET, FILM COATED ORAL at 14:59

## 2019-08-18 NOTE — ROUTINE PROCESS
SHIFT CHANGE NOTE FOR [place]    Bedside and Verbal shift change report given to Rocael Carrillo (oncoming nurse) by Daylin Raymundo RN   (offgoing nurse). Report included the following information SBAR, Kardex, MAR and Recent Results.     Situation:   Code Status: Full Code   Reason for Admission: CVA  Hospital Day: 3   Problem List:   Hospital Problems  Date Reviewed: 8/16/2019          Codes Class Noted POA    Leukocytosis ICD-10-CM: D72.829  ICD-9-CM: 288.60  8/16/2019 Yes    Overview Signed 8/16/2019 11:19 AM by Fabrice Shaffer MD     WBC count (8/16/2019) = 17.9             Hypertensive heart and kidney disease without heart failure and with stage 2 chronic kidney disease (Chronic) ICD-10-CM: I13.10, N18.2  ICD-9-CM: 404.90, 585.2  Unknown Yes        Chronic hypokalemia ICD-10-CM: E87.6  ICD-9-CM: 276.8  Unknown Yes    Overview Addendum 8/15/2019 11:31 AM by Fabrice Shaffer MD     Persistent chronic hypokalemia + hypertension; ?primary hyperaldosteronism             Type 2 diabetes mellitus with stage 2 chronic kidney disease (HCC) (Chronic) ICD-10-CM: E11.22, N18.2  ICD-9-CM: 250.40, 585.2  Unknown Yes    Overview Signed 8/15/2019 12:26 PM by Fabrice Shaffer MD     HbA1c (8/13/2019) = 6.6             Pure hypercholesterolemia (Chronic) ICD-10-CM: E78.00  ICD-9-CM: 272.0  8/15/2019 Yes    Overview Addendum 8/15/2019  6:27 PM by Fabrice Shaffer MD     Lipid profile (8/13/2019) showed , , HDL 42, ; Lipid profile (8/14/2019) showed , , HDL 39, LDL 94             Current use of aspirin ICD-10-CM: Z79.82  ICD-9-CM: V58.66  8/14/2019 Yes        On statin therapy due to risk of future cardiovascular event ICD-10-CM: Z79.899  ICD-9-CM: V58.69  8/14/2019 Yes    Overview Signed 8/15/2019 12:21 PM by Fabrice Shaffer MD     On Atorvastatin             Refusal of statin medication by patient ICD-10-CM: Z53.29  ICD-9-CM: V64.2  8/13/2019 Yes    Overview Signed 8/15/2019  2:01 PM by Fuad Menjivar MD [facility]     As per note of Neurology (Dr. Sanford Alvarado), patient refuses statin agent because of potential side effects.               * (Principal) Acute ischemic stroke [facility]) ICD-10-CM: I63.9  ICD-9-CM: 434.91  8/12/2019 Yes    Overview Signed 8/15/2019 12:17 AM by Ana Moreno MD     Acute Ischemic Stroke (acute/early subacute infarct at the left posterior basal ganglia to corona radiata) with residual right hemiparesis, dysphagia and dysarthria             Impaired mobility and ADLs ICD-10-CM: Z74.09  ICD-9-CM: 799.89  8/12/2019 Yes        Received intravenous tissue plasminogen activator (tPA) in emergency department ICD-10-CM: Z92.82  ICD-9-CM: V45.88  8/12/2019 Yes        [facility] ICD-10-CM: G81.91  ICD-9-CM: 342.90  8/12/2019 Yes        Dysphagia ICD-10-CM: R13.10  ICD-9-CM: 787.20  8/12/2019 Yes        Dysarthria ICD-10-CM: R47.1  ICD-9-CM: 784.51  8/12/2019 Yes              Background:   Past Medical History:   Past Medical History:   Diagnosis Date    Acute ischemic stroke ([facility] 8/12/2019    Acute Ischemic Stroke (acute/early subacute infarct at the left posterior basal ganglia to corona radiata) with residual right hemiparesis, dysphagia and dysarthria    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     On Allopurinol    Chronic hypokalemia     Persistent chronic hypokalemia + hypertension; ?primary hyperaldosteronism    CKD (chronic kidney disease) stage 2, GFR 60-89 ml/min 8/15/2019    Current use of aspirin 8/14/2019    Dysarthria 8/12/2019    Dysphagia 8/12/2019    Elevated prostate specific antigen (PSA) 7/21/2011    First degree atrioventricular block by electrocardiogram 8/12/2019    Hypertensive heart and kidney disease without heart failure and with stage 2 chronic kidney disease     Obesity, Class I, BMI 30-34.9     Obstructive sleep apnea on CPAP     On statin therapy due to risk of future cardiovascular event 8/14/2019    On Atorvastatin    Pure hypercholesterolemia 08/15/2019    Lipid profile (8/13/2019) showed , , HDL 42, ; Lipid profile (8/14/2019) showed , , HDL 39, LDL 94    Received intravenous tissue plasminogen activator (tPA) in emergency department 8/12/2019    Refusal of statin medication by patient 8/13/2019    As per note of Neurology (Dr. Jenni Yen), patient refuses statin agent because of potential side effects.  [facility]    Type 2 diabetes mellitus with stage 2 chronic kidney disease ([facility]     HbA1c (8/13/2019) = 6.6      Patient taking anticoagulants yes Heparin, ASA   Patient has a defibrillator: no     Assessment:   Changes in Assessment throughout shift: No     Patient has central line: no Reasons if yes: Insertion date: Last dressing date:   Patient has Boykin Cath: no Reasons if yes:     Insertion date:     Last Vitals:     Vitals:    08/17/19 0738 08/17/19 1456 08/17/19 2113 08/17/19 2200   BP: 169/79 159/83 184/80 184/80   Pulse: 65 81 80 80   Resp: 19 18 17   Temp: 98.1 °F (36.7 °C) 98.6 °F (37 °C)  97.8 °F (36.6 °C)   SpO2: 98% 99%  91%   Weight:       Height:            PAIN    Pain Assessment    Pain Intensity 1: 0 (08/18/19 0400) Pain Intensity 1: 2 (12/29/14 1105)      Pain Location 1: Abdomen      Pain Intervention(s) 1: Medication (see MAR)  Patient Stated Pain Goal: 0 Patient Stated Pain Goal: 0  o Intervention effective: no    o Other actions taken for pain:      Skin Assessment  Skin color Skin Color: Appropriate for ethnicity  Condition/Temperature Skin Condition/Temp: Dry, Warm  Integrity Skin Integrity: Tear  Turgor Turgor: Non-tenting  Weekly Pressure Ulcer Documentation  Pressure  Injury Documentation: No Pressure Injury Noted-Pressure Ulcer Prevention Initiated  Wound Prevention & Protection Methods  Orientation of wound Orientation of Wound Prevention: Posterior  Location of Prevention Location of Wound Prevention: Buttocks, Sacrum/Coccyx  Dressing Present Dressing Present : Yes  Dressing Status Dressing Status: Intact  Wound Offloading Wound Offloading (Prevention Methods): Bed, pressure redistribution/air     INTAKE/OUPUT  Date 08/17/19 0700 - 08/18/19 0659 08/18/19 0700 - 08/19/19 0659   Shift 0590-8676 2932-0840 24 Hour Total 2180-6878 2448-2624 24 Hour Total   INTAKE   P.O. 480  480        P. O. 480  480      Shift Total(mL/kg) 480(4.7)  480(4.7)      OUTPUT   Urine(mL/kg/hr)  375 375        Urine Voided  375 375        Urine Occurrence(s) 6 x 3 x 9 x      Stool           Stool Occurrence(s) 1 x 0 x 1 x      Shift Total(mL/kg)  375(3.7) 375(3.7)       -375 105      Weight (kg) 102 102 102 102 102 102       Recommendations:  1. Patient needs and requests:     2. Diet: Cardiac Mech Soft Nectar Thick     3. Pending tests/procedures:      4. Functional Level/Equipment:     5. Estimated Discharge Date: 8/24/19 Posted on Whiteboard in Patients Room: yes     HEALS Safety Check    A safety check occurred in the patient's room between off going nurse and oncoming nurse listed above. The safety check included the below items  Area Items   H  High Alert Medications - Verify all high alert medication drips (heparin, PCA, etc.)   E  Equipment - Suction is set up for ALL patients (with yanker)  - Red plugs utilized for all equipment (IV pumps, etc.)  - WOWs wiped down at end of shift.  - Room stocked with oxygen, suction, and other unit-specific supplies   A  Alarms - Bed alarm is set for fall risk patients  - Ensure chair alarm is in place and activated if patient is up in a chair   L  Lines - Check IV for any infiltration  - Boykin bag is empty if patient has a Boykin   - Tubing and IV bags are labeled   S  Safety   - Room is clean, patient is clean, and equipment is clean. - Hallways are clear from equipment besides carts.    - Fall bracelet on for fall risk patients  - Ensure room is clear and free of clutter  - Suction is set up for ALL patients (with diann)  - Hallways are clear from equipment besides carts.    - Isolation precautions followed, supplies available outside room, sign posted
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
## 2019-08-18 NOTE — PROGRESS NOTES
Problem: Mobility Impaired (Adult and Pediatric)  Goal: *Acute Goals and Plan of Care (Insert Text)  Description  Physical Therapy Short Term Goals  Initiated 2019 and to be accomplished within 14 day(s)  1. Patient will move from supine to sit and sit to supine  in bed with minimal assistance/contact guard assist.     2.  Patient will transfer from bed to chair and chair to bed with minimal/moderate assistance using the least restrictive device. 3.  Patient will perform sit to stand with minimal assistance/moderate assistance. 4.  Patient will ambulate with moderate assistance  for 10 feet with the least restrictive device. Physical Therapy Goals  Initiated 2019 and to be accomplished within 4 weeks  1. Patient will move from supine to sit and sit to supine  in bed with supervision/set-up. 2.  Patient will transfer from bed to chair and chair to bed with supervision/set-up using the least restrictive device. 3.  Patient will perform sit to stand with supervision/set-up. 4.  Patient will ambulate with minimal assistance for 50 feet with the least restrictive device. 5.  Patient will ascend/descend 2 stairs with 1 handrail(s) with moderate assistance . Outcome: Progressing Towards Goal   PHYSICAL THERAPY TREATMENT    Patient: Savi Carrasco (30 y.o. male)  Date: 2019  Diagnosis: Acute ischemic stroke [facility]) [I63.9] Acute ischemic stroke [facility])  Precautions: Aspiration, NWB fall risk  Chart, physical therapy assessment, plan of care and goals were reviewed. Time In: 827  Time Out: 1830  Patient Seen For: Wheelchair mobility;Transfer training;Balance activities; Therapeutic exercise(bed mobility)  Pain:  Pt pain was reported as  No c/o pre-treatment. Pt pain was reported as  No c/o post-treatment. Intervention:  NA    Patient identified with name and : yes     SUBJECTIVE:     Pt states \"I want to work hard to I can get out of here. \"     OBJECTIVE DATA SUMMARY:   Objective: BED/MAT MOBILITY Daily Assessment    Rolling Left : 4 (Minimal assistance)  Supine to Sit : 3 (Moderate assistance)  Sit to Supine : 3 (Moderate assistance)   Pt performed bed mobility on left side of mat table. Pt required modA with right LE for sit to left side to supine. Pt rolled supine to left side with Michael for right LE positioning and v/c for pt to assist right UE to roll to left. Pt required min/modA with right LE off EOM and CGA at trunk for left sidelying to sitting. TRANSFERS Daily Assessment    Transfer Type: Other  Other: stand step txfr without AD  Transfer Assistance : 3 (Moderate assistance )  Sit to Stand Assistance: Moderate assistance   Pt required modA for sit to stand from w/c pushing up with left arm rest. Pt required modA for sit to stand from 23\" mat table with NDT technique with B hands on right LE to increase wt bearing through left LE. Pt required modA for wt shift, balance, right LE management with facilitation for hip extension and knee blocking for stand step txfr w/c<->mat table without AD. Pt required max v/c for sequencing and to slow down as pt advancing left LE out of sequencing and pivoting left foot constantly. BALANCE Daily Assessment    Sitting - Static: Good (unsupported)  Sitting - Dynamic: Fair (occasional)  Standing - Static: Fair(-)  Standing - Dynamic : Impaired       WHEELCHAIR MOBILITY Daily Assessment    Able to Propel (ft): 217 feet  Functional Level: 6  Curbs/Ramps Assist Required (FIM Score): 0 (Not tested)  Wheelchair Setup Assist Required : 3 (Moderate assistance)  Wheelchair Management: Manages left brake;Manages right brake(with extender on right)   Pt propelled w/c from room to gym with karthik technique using left UE and left LE Ish. THERAPEUTIC EXERCISES Daily Assessment     Supine bridging 2x10 with left LE slightly extended, right LE SAQ and heel slides 2x10 each with mod/maxA with max v/c to limit compensation for SAQ.         Neuro Re-Education:  See sit to stand above. Once in standing pt required mod/maxA for wt shift to right LE due to pt with increased wt bearing and wt shift to left LE. Mirror used for visual feedback. Pt required max facilitation to extend right knee, hip and trunk. Pt required max v/c to stop moving left foot, as pt moving left foot midline as wt being shifted to right LE.     ASSESSMENT:  Pt demo'd flexed posture limiting pt's balance and stability, with increased wt shift to left side. Pt is unable to perform right knee extension in standing due to pt using compensatory pelvic posterior rotation and abdominal flexion to assist with right knee extension. Pt is impulsive and does not follow cues well, but verbalizes understanding of cues and purpose. Progression toward goals:  ?      Improving appropriately and progressing toward goals  x     Improving slowly and progressing toward goals  ? Not making progress toward goals and plan of care will be adjusted     PLAN:  Patient continues to benefit from skilled intervention to address the above impairments. Continue treatment per established plan of care. Discharge Recommendations:  Home Health with 24 hour assistance vs. SNF  Further Equipment Recommendations for Discharge:  TBD     Estimated LOS: TBD    Activity Tolerance:   fair  Please refer to the flowsheet for vital signs taken during this treatment. After treatment:   Patient left self propelling back to room on own power with wife along side pt.        Shashi Grimaldo, PTA  8/18/2019

## 2019-08-18 NOTE — PROGRESS NOTES
Problem: Self Care Deficits Care Plan (Adult)  Goal: *Therapy Goal (Edit Goal, Insert Text)  Description  Occupational Therapy Goals   Long Term Goals  Initiated 2019 and to be accomplished within 4 week(s)  1. Pt will perform self-feeding with MI.  2. Pt will perform grooming with Set-up. 3. Pt will perform UB bathing with SBA. 4. Pt will perform LB bathing with Luba/CGA. 5. Pt will perform tub/shower transfer with Luba/CGA using LRAD. 6. Pt will perform UB dressing with Set-up. 7. Pt will perform LB dressing with Luba/CGA. 8. Pt will perform toileting task with Luba/CGA. 9. Pt will perform toilet transfer with Luba/CGA using LRAD. Short Term Goals   Initiated 2019 and to be accomplished within 7 day(s) (2019)  1. Pt will perform self-feeding with S; using compensatory strategies for right hand incorporation into set-up. 2. Pt will perform simple grooming task, including denture mgmt, with Luba and compensatory strategies as needed. 3. Pt will perform UB bathing with Luba using AE as needed. 4. Pt will perform LB bathing with ModA using AE and compensatory strategies as needed. 5. Pt will perform tub/shower transfer with modA x 1 <>TTB. 6. Pt will perform UB dressing with CGA. 7. Pt will perform LB dressing with ModA using AE as needed. 8. Pt will perform toileting task with ModA. 9. Pt will perform toilet transfer with ModA x 1 <>3:1 commode. Outcome: Progressing Towards Goal   OCCUPATIONAL THERAPY TREATMENT    Patient: Rola Franco   71 y.o. Patient identified with name and : Yes    Date: 2019    First Tx Session  Time In: 0930  Time Out[de-identified] 1000    Diagnosis: Acute ischemic stroke [facility]) [I63.9]   Precautions: Aspiration, NWB  Chart, occupational therapy assessment, plan of care, and goals were reviewed. Pain:  Pt reports 0/10 pain or discomfort prior to treatment. Pt reports 0/10 pain or discomfort post treatment.    Intervention Provided: N/A      SUBJECTIVE:   Patient stated Yes Maam.    OBJECTIVE DATA SUMMARY:     NMRE Daily Assessment    Left UE:PROM in all direction x10,  isometric contraction x10 with 10 count while shoulder at 90 degrees pushing into therapist hand,  resistive IR x10 with 10 count, shoulder shrugs x10. Increased movement proximally with minimal movement distally. Note: Sensation appears intact. MOBILITY/TRANSFERS Daily Assessment     Propelled w/c with sup using left LE/UE.       ASSESSMENT:  Pt showing increased muscle contraction proximally at shoulder with less movement in forearm, and no movement in hand. Pt to continue increasing BUE strength and neuromotor control at LUE for increased I in  ADLs/IADLs. Progression toward goals:  ?x          Improving appropriately and progressing toward goals  ? Improving slowly and progressing toward goals  ? Not making progress toward goals and plan of care will be adjusted     PLAN:  Patient continues to benefit from skilled intervention to address the above impairments. Continue treatment per established plan of care. Discharge Recommendations:  Home Health vs SNF pending progress  Further Equipment Recommendations for Discharge:  3 in 1 bedside commode and shower transfer chair. Activity Tolerance:  Fair      Estimated LOS:TBD    Please refer to the flowsheet for vital signs taken during this treatment. After treatment:   ?x  Patient left in no apparent distress sitting up in chair   ? Patient left in no apparent distress in bed  ? Call bell left within reach  ? Nursing notified  ? Caregiver present  ? Bed alarm activated    COMMUNICATION/EDUCATION:   ? Home safety education was provided and the patient/caregiver indicated understanding. ?x Patient/family have participated as able in goal setting and plan of care. ? Patient/family agree to work toward stated goals and plan of care.   ? Patient understands intent and goals of therapy, but is neutral about his/her participation. ? Patient is unable to participate in goal setting and plan of care.       Jeanene Favre, OT

## 2019-08-18 NOTE — PROGRESS NOTES
Progress Note    Patient: Yvonne Walton MRN: [id number]  CSN: [id number]    YOB: 1955  Age: 71 y.o. Sex: male    DOA: 8/15/2019 LOS:  LOS: 3 days                    Subjective:     Primary Rehab Impairment Category (ANGIE): Stroke     Impairment Group Label: Right body involvement (left brain)     Etiologic Diagnosis: Acute Ischemic Stroke (acute/early subacute infarct at the left posterior basal ganglia to corona radiata) with residual right hemiparesis, dysphagia and dysarthria    Pt seen and examined no new c/o . Bp running high   Review of systems  General: No fevers or chills. Cardiovascular: No chest pain or pressure. No palpitations. Pulmonary: No shortness of breath, cough or wheeze. Gastrointestinal: No abdominal pain, nausea, vomiting or diarrhea. Genitourinary: No  dysuria. Musculoskeletal:  Rt arm and Rt leg weakness    Neurologic: No headache, Rt sided weakness    Objective:     Physical Exam:  Visit Vitals  /85 (BP 1 Location: Left leg, BP Patient Position: At rest)   Pulse 79   Temp 98.3 °F (36.8 °C)   Resp 18   Ht 5' 9\" (1.753 m)   Wt 102 kg (224 lb 14.4 oz)   SpO2 95%   BMI 33.21 kg/m²        General:         Alert,  no acute distress    HEENT: NC, Atraumatic. PERRLA, anicteric sclerae. Lungs: CTA Bilaterally. No Wheezing/Rhonchi/Rales. Heart:  Regular  rhythm,  No murmur, No Rubs, No Gallops  Abdomen: Soft, Non distended, Non tender. Extremities: No lower limb edema  Psych:    Not anxious or agitated. Neurologic:  CN 2-12 grossly intact, Alert and oriented X 3. Rt arm and Rt leg weakness no changes from before  Intake and Output:  Current Shift:  No intake/output data recorded.   Last three shifts:  08/16 1901 - 08/18 0700  In: 480 [P.O.:480]  Out: 950 [Urine:950]    Labs: Results:       Chemistry Recent Labs     08/18/19  0615 08/17/19  0650 08/16/19  0630   * 121* 136*    143 142   K 3.7 3.5 3.8    110 107   CO2 24 24 25   BUN 37* 40* 26* CREA 1.29 1.39* 1.20   CA 8.3* 7.9* 8.3*   AGAP 9 9 10   BUCR 29* 29* 22*   *  --  188*   TP 6.1*  --  6.5   ALB 2.9*  --  3.2*   GLOB 3.2  --  3.3   AGRAT 0.9  --  1.0      CBC w/Diff Recent Labs     08/18/19  0615 08/16/19  0630   WBC 11.6 17.9*   RBC 3.25* 3.86*   HGB 8.7* 10.7*   HCT 27.4* 32.6*    305   GRANS 74* 91*   LYMPH 15* 4*   EOS 3 2      Cardiac Enzymes No results for input(s): CPK, CKND1, DANY in the last 72 hours. No lab exists for component: CKRMB, TROIP   Coagulation No results for input(s): PTP, INR, APTT in the last 72 hours. No lab exists for component: INREXT, INREXT    Lipid Panel Lab Results   Component Value Date/Time    Cholesterol, total 173 08/14/2019 03:42 AM    HDL Cholesterol 39 (L) 08/14/2019 03:42 AM    LDL, calculated 94 08/14/2019 03:42 AM    VLDL, calculated 40 08/14/2019 03:42 AM    Triglyceride 200 (H) 08/14/2019 03:42 AM    CHOL/HDL Ratio 4.4 08/14/2019 03:42 AM      BNP No results for input(s): BNPP in the last 72 hours.    Liver Enzymes Recent Labs     08/18/19  0615   TP 6.1*   ALB 2.9*   *   SGOT 14      Thyroid Studies Lab Results   Component Value Date/Time    TSH 1.72 08/13/2019 12:05 PM          Procedures/imaging: see electronic medical records for all procedures/Xrays and details which were not copied into this note but were reviewed prior to creation of Plan    Medications:   Current Facility-Administered Medications   Medication Dose Route Frequency    isosorbide dinitrate (ISORDIL) tablet 10 mg  10 mg Oral Q8H    labetalol (NORMODYNE) tablet 200 mg  200 mg Oral Q12H    acetaminophen (TYLENOL) tablet 650 mg  650 mg Oral Q4H PRN    bisacodyl (DULCOLAX) tablet 10 mg  10 mg Oral Q48H PRN    heparin (porcine) injection 5,000 Units  5,000 Units SubCUTAneous Q8H    insulin lispro (HUMALOG) injection   SubCUTAneous TIDAC    magnesium oxide (MAG-OX) tablet 400 mg  400 mg Oral DAILY    aspirin chewable tablet 81 mg  81 mg Oral DAILY WITH BREAKFAST    amLODIPine (NORVASC) tablet 10 mg  10 mg Oral DAILY    atorvastatin (LIPITOR) tablet 80 mg  80 mg Oral QHS    hydrALAZINE (APRESOLINE) tablet 75 mg  75 mg Oral Q8H       Assessment/Plan     Principal Problem:    Acute ischemic stroke ([facility] [address] (8/12/2019)      Overview: Acute Ischemic Stroke (acute/early subacute infarct at the left posterior       basal ganglia to corona radiata) with residual right hemiparesis,       dysphagia and dysarthria    Active Problems:    Impaired mobility and ADLs (8/12/2019)      Received intravenous tissue plasminogen activator (tPA) in emergency department (8/12/2019)      Hypertensive heart and kidney disease without heart failure and with stage 2 chronic kidney disease ()      Right hemiparesis ([address] (8/12/2019)      Dysphagia (8/12/2019)      Dysarthria (8/12/2019)      Chronic hypokalemia ()      Overview: Persistent chronic hypokalemia + hypertension; ?primary hyperaldosteronism      Current use of aspirin (8/14/2019)      On statin therapy due to risk of future cardiovascular event (8/14/2019)      Overview: On Atorvastatin      Type 2 diabetes mellitus with stage 2 chronic kidney disease (HCC) ()      Overview: HbA1c (8/13/2019) = 6.6      Pure hypercholesterolemia (8/15/2019)      Overview: Lipid profile (8/13/2019) showed , , HDL 42, ; Lipid       profile (8/14/2019) showed , , HDL 39, LDL 94      Refusal of statin medication by patient (8/13/2019)      Overview: As per note of Neurology (Dr. Luis Farah), patient refuses statin agent       because of potential side effects.        Leukocytosis (8/16/2019)      Overview: WBC count (8/16/2019) = 17.9      Acute Ischemic Stroke (acute/early subacute infarct at the left posterior basal ganglia to corona radiata) with residual right hemiparesis, dysphagia and dysarthria; S/P Administration of intravenous tPA (8/12/2019 - Ekta [place]) Aspirin 81 mg PO once daily with breakfast              Atorvastatin 80 mg PO q HS    Type 2 diabetes mellitus with stage 2 chronic kidney disease               HbA1c (8/13/2019) = 6.6               Humalog insulin sliding scale SC TID     Question primary hyperaldosteronism  Started on spironolactone 25 mg daily  Potassium level today 3.5  F/u endocrinology consult    Hypertension  Labetalol 200 mg BID  Norvasc 10 mg daily  Hydralazine 75 mg q 8h   will add Hydralazine  25 mg q8h prn SBP above 170    Chronic leucocytosis   WBC 8/16 17.9 repeat WBc today 11.6 H&H slightly down   Recheck H &H per protocol       DVT prophylaxis heparin SQ.          Padmaja Reese MD  8/18/2019 10:39 AM

## 2019-08-19 PROBLEM — E55.9 VITAMIN D DEFICIENCY: Chronic | Status: ACTIVE | Noted: 2019-08-19

## 2019-08-19 PROBLEM — D50.9 IRON DEFICIENCY ANEMIA: Chronic | Status: ACTIVE | Noted: 2019-08-14

## 2019-08-19 PROBLEM — D51.9 VITAMIN B12 DEFICIENCY ANEMIA: Chronic | Status: ACTIVE | Noted: 2019-08-14

## 2019-08-19 LAB
25(OH)D3 SERPL-MCNC: 16.2 NG/ML (ref 30–100)
ANION GAP SERPL CALC-SCNC: 7 MMOL/L (ref 3–18)
BUN SERPL-MCNC: 36 MG/DL (ref 7–18)
BUN/CREAT SERPL: 29 (ref 12–20)
CALCIUM SERPL-MCNC: 8.3 MG/DL (ref 8.5–10.1)
CHLORIDE SERPL-SCNC: 107 MMOL/L (ref 100–111)
CO2 SERPL-SCNC: 26 MMOL/L (ref 21–32)
CREAT SERPL-MCNC: 1.26 MG/DL (ref 0.6–1.3)
GLUCOSE BLD STRIP.AUTO-MCNC: 119 MG/DL (ref 70–110)
GLUCOSE BLD STRIP.AUTO-MCNC: 127 MG/DL (ref 70–110)
GLUCOSE BLD STRIP.AUTO-MCNC: 131 MG/DL (ref 70–110)
GLUCOSE BLD STRIP.AUTO-MCNC: 135 MG/DL (ref 70–110)
GLUCOSE SERPL-MCNC: 104 MG/DL (ref 74–99)
HCT VFR BLD AUTO: 25.2 % (ref 36–48)
HGB BLD-MCNC: 8.4 G/DL (ref 13–16)
MAGNESIUM SERPL-MCNC: 2.2 MG/DL (ref 1.6–2.6)
PLATELET # BLD AUTO: 258 K/UL (ref 135–420)
POTASSIUM SERPL-SCNC: 3.5 MMOL/L (ref 3.5–5.5)
SODIUM SERPL-SCNC: 140 MMOL/L (ref 136–145)
T4 FREE SERPL-MCNC: 1 NG/DL (ref 0.7–1.5)
TSH SERPL DL<=0.05 MIU/L-ACNC: 4.63 UIU/ML (ref 0.36–3.74)

## 2019-08-19 PROCEDURE — 97112 NEUROMUSCULAR REEDUCATION: CPT

## 2019-08-19 PROCEDURE — 83735 ASSAY OF MAGNESIUM: CPT

## 2019-08-19 PROCEDURE — 97535 SELF CARE MNGMENT TRAINING: CPT

## 2019-08-19 PROCEDURE — 82306 VITAMIN D 25 HYDROXY: CPT

## 2019-08-19 PROCEDURE — 97530 THERAPEUTIC ACTIVITIES: CPT

## 2019-08-19 PROCEDURE — 97110 THERAPEUTIC EXERCISES: CPT

## 2019-08-19 PROCEDURE — 65310000000 HC RM PRIVATE REHAB

## 2019-08-19 PROCEDURE — 83001 ASSAY OF GONADOTROPIN (FSH): CPT

## 2019-08-19 PROCEDURE — 97116 GAIT TRAINING THERAPY: CPT

## 2019-08-19 PROCEDURE — 85018 HEMOGLOBIN: CPT

## 2019-08-19 PROCEDURE — 85049 AUTOMATED PLATELET COUNT: CPT

## 2019-08-19 PROCEDURE — 80048 BASIC METABOLIC PNL TOTAL CA: CPT

## 2019-08-19 PROCEDURE — 74011250637 HC RX REV CODE- 250/637: Performed by: FAMILY MEDICINE

## 2019-08-19 PROCEDURE — 36415 COLL VENOUS BLD VENIPUNCTURE: CPT

## 2019-08-19 PROCEDURE — 84403 ASSAY OF TOTAL TESTOSTERONE: CPT

## 2019-08-19 PROCEDURE — 82962 GLUCOSE BLOOD TEST: CPT

## 2019-08-19 PROCEDURE — 92526 ORAL FUNCTION THERAPY: CPT

## 2019-08-19 PROCEDURE — 74011250636 HC RX REV CODE- 250/636: Performed by: INTERNAL MEDICINE

## 2019-08-19 PROCEDURE — 92507 TX SP LANG VOICE COMM INDIV: CPT

## 2019-08-19 PROCEDURE — 74011250637 HC RX REV CODE- 250/637: Performed by: INTERNAL MEDICINE

## 2019-08-19 PROCEDURE — 86376 MICROSOMAL ANTIBODY EACH: CPT

## 2019-08-19 PROCEDURE — 84439 ASSAY OF FREE THYROXINE: CPT

## 2019-08-19 PROCEDURE — 84443 ASSAY THYROID STIM HORMONE: CPT

## 2019-08-19 RX ORDER — LABETALOL 100 MG/1
300 TABLET, FILM COATED ORAL EVERY 12 HOURS
Status: DISCONTINUED | OUTPATIENT
Start: 2019-08-19 | End: 2019-08-19

## 2019-08-19 RX ORDER — ISOSORBIDE DINITRATE 20 MG/1
20 TABLET ORAL EVERY 8 HOURS
Status: DISCONTINUED | OUTPATIENT
Start: 2019-08-19 | End: 2019-08-19

## 2019-08-19 RX ORDER — LABETALOL 100 MG/1
400 TABLET, FILM COATED ORAL EVERY 12 HOURS
Status: DISCONTINUED | OUTPATIENT
Start: 2019-08-20 | End: 2019-08-22

## 2019-08-19 RX ORDER — ASCORBIC ACID 250 MG
250 TABLET ORAL
Status: DISCONTINUED | OUTPATIENT
Start: 2019-08-19 | End: 2019-08-28

## 2019-08-19 RX ORDER — CHOLECALCIFEROL (VITAMIN D3) 125 MCG
5000 CAPSULE ORAL DAILY
Status: DISCONTINUED | OUTPATIENT
Start: 2019-08-20 | End: 2019-09-05 | Stop reason: HOSPADM

## 2019-08-19 RX ORDER — LANOLIN ALCOHOL/MO/W.PET/CERES
1 CREAM (GRAM) TOPICAL
Status: DISCONTINUED | OUTPATIENT
Start: 2019-08-19 | End: 2019-08-28

## 2019-08-19 RX ORDER — LANOLIN ALCOHOL/MO/W.PET/CERES
1000 CREAM (GRAM) TOPICAL DAILY
Status: DISCONTINUED | OUTPATIENT
Start: 2019-08-19 | End: 2019-09-05 | Stop reason: HOSPADM

## 2019-08-19 RX ADMIN — HEPARIN SODIUM 5000 UNITS: 5000 INJECTION INTRAVENOUS; SUBCUTANEOUS at 13:47

## 2019-08-19 RX ADMIN — HYDRALAZINE HYDROCHLORIDE 25 MG: 25 TABLET, FILM COATED ORAL at 06:09

## 2019-08-19 RX ADMIN — ACETAMINOPHEN 650 MG: 325 TABLET ORAL at 16:37

## 2019-08-19 RX ADMIN — ISOSORBIDE DINITRATE 20 MG: 20 TABLET ORAL at 21:38

## 2019-08-19 RX ADMIN — HYDRALAZINE HYDROCHLORIDE 75 MG: 50 TABLET, FILM COATED ORAL at 06:08

## 2019-08-19 RX ADMIN — ISOSORBIDE DINITRATE 10 MG: 10 TABLET ORAL at 06:09

## 2019-08-19 RX ADMIN — HEPARIN SODIUM 5000 UNITS: 5000 INJECTION INTRAVENOUS; SUBCUTANEOUS at 21:39

## 2019-08-19 RX ADMIN — EPOETIN ALFA-EPBX 10000 UNITS: 10000 INJECTION, SOLUTION INTRAVENOUS; SUBCUTANEOUS at 23:28

## 2019-08-19 RX ADMIN — FERROUS SULFATE TAB 325 MG (65 MG ELEMENTAL FE) 325 MG: 325 (65 FE) TAB at 13:47

## 2019-08-19 RX ADMIN — LABETALOL HYDROCHLORIDE 300 MG: 100 TABLET, FILM COATED ORAL at 21:38

## 2019-08-19 RX ADMIN — HEPARIN SODIUM 5000 UNITS: 5000 INJECTION INTRAVENOUS; SUBCUTANEOUS at 06:09

## 2019-08-19 RX ADMIN — AMLODIPINE BESYLATE 10 MG: 10 TABLET ORAL at 09:03

## 2019-08-19 RX ADMIN — Medication 400 MG: at 09:03

## 2019-08-19 RX ADMIN — Medication 50000 UNITS: at 16:37

## 2019-08-19 RX ADMIN — HYDRALAZINE HYDROCHLORIDE 75 MG: 50 TABLET, FILM COATED ORAL at 13:46

## 2019-08-19 RX ADMIN — ISOSORBIDE DINITRATE 20 MG: 20 TABLET ORAL at 13:47

## 2019-08-19 RX ADMIN — Medication 250 MG: at 13:46

## 2019-08-19 RX ADMIN — ASPIRIN 81 MG 81 MG: 81 TABLET ORAL at 09:03

## 2019-08-19 RX ADMIN — CYANOCOBALAMIN TAB 1000 MCG 1000 MCG: 1000 TAB at 13:46

## 2019-08-19 RX ADMIN — LABETALOL HYDROCHLORIDE 200 MG: 100 TABLET, FILM COATED ORAL at 09:03

## 2019-08-19 RX ADMIN — ATORVASTATIN CALCIUM 80 MG: 40 TABLET, FILM COATED ORAL at 21:38

## 2019-08-19 RX ADMIN — HYDRALAZINE HYDROCHLORIDE 75 MG: 50 TABLET, FILM COATED ORAL at 21:39

## 2019-08-19 NOTE — ROUTINE PROCESS
0800 Pt. Awake sitting up in bed no change in assessment no signs of distress pt. Stated to be feeling fine. 0930 Pt. Sitting up in chair eating breakfast.  1200 pt. With therapy. 1330 able to transfer by wheelchair. 1500 no change in assessment. 24 hr urine still in progress. Pt. Instructed to remain NPO 6pm to 6am for testing. 1800 Pt. Sitting up in chair eating dinner.

## 2019-08-19 NOTE — CONSULTS
[address] [facility]    Name:  Alexandrea Caal  MR#:   [id number]  :  1955  ACCOUNT #:  [de-identified]  DATE OF SERVICE:  2019    ENDOCRINOLOGY CONSULTATION    HISTORY OF PRESENT ILLNESS:  This 69-year-old man was admitted to the hospital for a stroke. Hypokalemia was found and consultation was made for assistance in its management. The patient was well, subsisting at home when he noticed sudden weakness of his right arm and leg and difficulty speaking. He was brought to the emergency room where tPA was administered and he was admitted to the medical robert. He was found to have a left basal ganglia stroke and on exam, he had a right hemiparesis. He was placed on aspirin one tablet daily and after several days, transferred to the rehab unit. PAST MEDICAL HISTORY:  The patient has had hypertension for 20 years. He has been on a variety of medicines. He does not remember the names, except for hydrochlorothiazide, which was started about a month before admission. He admits to having been found to have low potassium as long as 10 years ago. He was still to take a potassium supplement, which was originally 20 mEq per day over the last year that was reduced to 10 mEq per day. He also would drink one glass of orange juice every morning as a potassium replacement. Also, for about the last decade, he has been told he has \"borderline\" diabetes. He was told to watch his sugar intake, but did not receive any pharmacologic treatment. He does not check his blood sugar. He denies any known diabetic complications. SOCIAL HISTORY:  He worked for many years at the [employer]. 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 [place] He has been  for 27 years. MEDICATIONS AT [address]:  1. Amlodipine 10 mg daily. 2.  Aspirin 81 mg daily. 3.  Lipitor 80 mg daily. 4.  Hydralazine 75 mg every 8 hours.   5. Isordil 10 mg every 8 hours. 6.  Labetalol 200 mg every 12 hours. 7.  Magnesium oxide 400 mg daily. PHYSICAL EXAMINATION:  VITAL SIGNS:  Height 5 feet 9 inches, weight 102 kilograms. GENERAL APPEARANCE:  The patient is moderately obese  man, examined sitting in a wheelchair. He has slight right facial droop and slurred speech. HEENT:  Eyes:  Pupils equal and reactive. The eyes are devoid of any arcus senilis. Mouth and throat:  Unremarkable. NECK:  Thyroid normal in size and consistency. There is no acanthosis. EXTREMITIES:  There is 1+ edema of both ankles. Extremities are somewhat pale. SKIN:  Pale on the legs. He has ecchymosis on both forearms. CARDIAC:  The S1 and S2 are normal.  No murmurs are present. There was a soft S4 loudest at left sternal border. .  ABDOMEN:  Mildly obese. No hepatosplenomegaly. NEUROLOGIC:  There is weakness of the right arm and leg. Sensation is normal to light touch on the left sole, slightly reduced on the right. Pulses:  He has a bounding radial pulse. Dorsalis pedis pulses are normal bilaterally. LABORATORY DATA:  Hematocrit is 27, WBC count 11,600. Hematocrit was 33.6 on admission. The MCV is 84.3. Platelet count 685,630. Absolute neutrophil count is 8700. Lymphocyte count 1700. Chemistries show a creatinine of 1.29 with estimated GFR of 56. Sodium 143, potassium 3.7. This was 2.8 on admission to the hospital five days ago. Glucose 105, BUN 37, calcium 8.3. Albumin 2.9. SGPT 15, SGOT 14, alkaline phosphatase 146. Total cholesterol 173. HDL 39, LDL 94.  . CK-MB 1.5. Vitamin B12 208. Folate 7.1. Hemoglobin A1c 6.6%. Iron 22. Total iron binding capacity 371. Iron percent saturation 6. Ferritin 34. Aldosterone-to-renin ratio was not drawn on initially, but it was drawn two days ago and is pending. ASSESSMENT:  1. Chronic hypokalemia. 2.  Diabetes mellitus.   3.  Diabetic nephropathy with a stage II chronic renal disease. 4.  Normocytic anemia. 5.  Vitamin B12 deficiency. 6.  Borderline iron deficiency. 7.  Hypoalbuminemia. DISCUSSION:  The patient may well have hyperaldosteronism, but he may have it on the basis of his nephropathy, which is due to his underlying diabetes. The cause of his anemia might be a combination of B12 deficiency and iron deficiency together, so investigation should be done for this. PLAN:  Laboratory studies:  1.  24-hour urine for aldosteronism. 2.  Methylmalonic acid level. 3.  Gastrin level. 4.  Microalbumin-to-creatinine ratio. 5.  Transferrin and prealbumin levels. Thank you for consulting us in his care. We will follow along with you.       Steff Stevenson MD      [id number]  D:  08/18/2019 15:11  T:  08/18/2019 20:14  JOB #:  [id number]

## 2019-08-19 NOTE — PROGRESS NOTES
[facility] PHYSICAL REHABILITATION  [address] [place] [address], [address]     INPATIENT REHABILITATION  DAILY PROGRESS NOTE     Date: 8/19/2019    Name: Anand Eric Age / Sex: 71 y.o. / male   CSN: [id number] MRN: [id number]   [facility] Date: 8/15/2019 Length of Stay: 4 days     Primary Rehab Diagnosis: Impaired Mobility and ADLs secondary to Acute Ischemic Stroke (acute/early subacute infarct at the left posterior basal ganglia to corona radiata) with residual right hemiparesis, dysphagia and dysarthria      Subjective:     Patient seen and examined. Blood pressure remain poorly controlled. Blood glucose fairly controlled. Patient's Complaint:   No significant medical complaints    Pain Control: no current joint or muscle symptoms, essentially pain-free      Objective:     Vital Signs:  Patient Vitals for the past 24 hrs:   BP Temp Pulse Resp SpO2   08/19/19 0737 170/72 97.3 °F (36.3 °C) 70 16 96 %   08/19/19 0608 (!) 194/91 -- 75 -- --   08/18/19 2206 152/72 -- 73 -- --   08/18/19 2100 (!) 192/92 98.4 °F (36.9 °C) 82 18 95 %   08/18/19 1624 144/72 -- 74 19 --   08/18/19 1457 166/76 97.1 °F (36.2 °C) 88 18 95 %        Physical Examination:  GENERAL SURVEY: Patient is awake, alert, oriented x 3, sitting comfortably on the chair, not in acute respiratory distress. HEENT: pink palpebral conjunctivae, anicteric sclerae, no nasoaural discharge, moist oral mucosa  NECK: supple, no jugular venous distention, no palpable lymph nodes  CHEST/LUNGS: symmetrical chest expansion, good air entry, clear breath sounds  HEART: adynamic precordium, good S1 S2, no S3, regular rhythm, no murmurs  ABDOMEN: obese, bowel sounds appreciated, soft, non-tender  EXTREMITIES: pink nailbeds, no edema, full and equal pulses, no calf tenderness   NEUROLOGICAL EXAM: The patient is awake, alert and oriented x3, able to answer questions fairly appropriately, able to follow 1 and 2 step commands.   Able to tell time from the wall clock.  Cranial nerves II-XII are grossly intact except for slightly shallow right nasolabial fold, dysarthria and dysphagia. No gross sensory deficit. Motor strength is 5/5 on the LUE and LLE, 0/5 on the RUE (except for 1/5 on the right shoulder), 2 to 2+/5 on RLE (except for 0/5 on the right ankle/foot).       Current Medications:  Current Facility-Administered Medications   Medication Dose Route Frequency    hydrALAZINE (APRESOLINE) tablet 25 mg  25 mg Oral TID PRN    isosorbide dinitrate (ISORDIL) tablet 10 mg  10 mg Oral Q8H    labetalol (NORMODYNE) tablet 200 mg  200 mg Oral Q12H    acetaminophen (TYLENOL) tablet 650 mg  650 mg Oral Q4H PRN    bisacodyl (DULCOLAX) tablet 10 mg  10 mg Oral Q48H PRN    heparin (porcine) injection 5,000 Units  5,000 Units SubCUTAneous Q8H    insulin lispro (HUMALOG) injection   SubCUTAneous TIDAC    magnesium oxide (MAG-OX) tablet 400 mg  400 mg Oral DAILY    aspirin chewable tablet 81 mg  81 mg Oral DAILY WITH BREAKFAST    amLODIPine (NORVASC) tablet 10 mg  10 mg Oral DAILY    atorvastatin (LIPITOR) tablet 80 mg  80 mg Oral QHS    hydrALAZINE (APRESOLINE) tablet 75 mg  75 mg Oral Q8H       Allergies:  No Known Allergies      Lab/Data Review:  Recent Results (from the past 24 hour(s))   PTH INTACT    Collection Time: 08/18/19  4:13 PM   Result Value Ref Range    Calcium 8.6 8.5 - 10.1 MG/DL    PTH, Intact 101.7 (H) 18.4 - 88.0 pg/mL   GLUCOSE, POC    Collection Time: 08/18/19  5:03 PM   Result Value Ref Range    Glucose (POC) 120 (H) 70 - 110 mg/dL   GLUCOSE, POC    Collection Time: 08/18/19  8:32 PM   Result Value Ref Range    Glucose (POC) 144 (H) 70 - 196 mg/dL   METABOLIC PANEL, BASIC    Collection Time: 08/19/19  6:55 AM   Result Value Ref Range    Sodium 140 136 - 145 mmol/L    Potassium 3.5 3.5 - 5.5 mmol/L    Chloride 107 100 - 111 mmol/L    CO2 26 21 - 32 mmol/L    Anion gap 7 3.0 - 18 mmol/L    Glucose 104 (H) 74 - 99 mg/dL    BUN 36 (H) 7.0 - 18 MG/DL Creatinine 1.26 0.6 - 1.3 MG/DL    BUN/Creatinine ratio 29 (H) 12 - 20      GFR est AA >60 >60 ml/min/1.73m2    GFR est non-AA 57 (L) >60 ml/min/1.73m2    Calcium 8.3 (L) 8.5 - 10.1 MG/DL   HGB & HCT    Collection Time: 08/19/19  6:55 AM   Result Value Ref Range    HGB 8.4 (L) 13.0 - 16.0 g/dL    HCT 25.2 (L) 36.0 - 48.0 %   PLATELET COUNT    Collection Time: 08/19/19  6:55 AM   Result Value Ref Range    PLATELET 707 231 - 457 K/uL   T4, FREE    Collection Time: 08/19/19  6:55 AM   Result Value Ref Range    T4, Free 1.0 0.7 - 1.5 NG/DL   TSH 3RD GENERATION    Collection Time: 08/19/19  6:55 AM   Result Value Ref Range    TSH 4.63 (H) 0.36 - 3.74 uIU/mL   VITAMIN D, 25 HYDROXY    Collection Time: 08/19/19  6:55 AM   Result Value Ref Range    Vitamin D 25-Hydroxy 16.2 (L) 30 - 100 ng/mL   GLUCOSE, POC    Collection Time: 08/19/19  7:49 AM   Result Value Ref Range    Glucose (POC) 119 (H) 70 - 110 mg/dL   GLUCOSE, POC    Collection Time: 08/19/19 11:29 AM   Result Value Ref Range    Glucose (POC) 135 (H) 70 - 110 mg/dL       Estimated Glomerular Filtration Rate:  On admission, estimated GFR based on a Creatinine of 1.20 mg/dl:              Using CKD-EPI = 63.1 mL/min/1.73m2              Using MDRD = 64.6 mL/min/1.73m2  Most recent estimated GFR, based on a Creatinine of 1.26 mg/dl on 8/19/2019:   Using CKD-EPI = 59.5 mL/min/1.73m2   Using MDRD = 61 mL/min/1.73m2       Assessment:     Primary Rehabilitation Diagnosis  1. Impaired Mobility and ADLs  2.  Acute Ischemic Stroke (acute/early subacute infarct at the left posterior basal ganglia to corona radiata) with residual right hemiparesis, dysphagia and dysarthria     Comorbidities   Obesity, Class I, BMI 30-34.9 E66.9    Obstructive sleep apnea on CPAP G47.33, Z99.89    Hypertensive heart and kidney disease without heart failure and with stage 2 chronic kidney disease I13.10, N18.2    Chronic hypokalemia E87.6    CKD (chronic kidney disease) stage 2, GFR 60-89 ml/min N18.2    Current use of aspirin Z79.82    On statin therapy due to risk of future cardiovascular event Z79.899    Type 2 diabetes mellitus with stage 2 chronic kidney disease  E11.22, N18.2    Pure hypercholesterolemia E78.00    Elevated prostate specific antigen (PSA) R97.20    Refusal of statin medication by patient Z48.34    First degree atrioventricular block by electrocardiogram I44.0    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 20X0    Leukocytosis D72.829    Iron deficiency anemia D50.9    Vitamin B12 anemia D51.9    Vitamin D deficiency E55. 9        Plan:     1. Justification for continued stay: Good progression towards established rehabilitation goals. 2. Medical Issues being followed closely:    [x]  Fall and safety precautions     []  Wound Care     [x]  Bowel and Bladder Function     [x]  Fluid Electrolyte and Nutrition Balance     []  Pain Control      3. Issues that 24 hour rehabilitation nursing is following:    [x]  Fall and safety precautions     []  Wound Care     [x]  Bowel and Bladder Function     [x]  Fluid Electrolyte and Nutrition Balance     []  Pain Control      [x]  Assistance with and education on in-room safety with transfers to and from the bed, wheelchair, toilet and shower. 4. Acute rehabilitation plan of care:    [x]  Continue current care and rehab. [x]  Physical Therapy           [x]  Occupational Therapy           [x]  Speech Therapy     []  Hold Rehab until further notice     5. Medications:    [x]  MAR Reviewed     [x]  Continue Present Medications     6. DVT Prophylaxis:      []  Lovenox     [x]  Unfractionated Heparin     []  Coumadin     []  NOAC     []  SUZAN Stockings     []  Sequential Compression Device     []  None     7.  Orders:   > Acute Ischemic Stroke (acute/early subacute infarct at the left posterior basal ganglia to corona radiata) with residual right hemiparesis, dysphagia and dysarthria; S/P Administration of intravenous tPA (8/12/2019 - [address])   > Continue:    > Aspirin 81 mg PO once daily with breakfast    > Atorvastatin 80 mg PO q HS    > Chronic hypokalemia    Serum Potassium during hospital stay at [address]      08/15/19  0330 08/14/19  1848 08/14/19  0342 08/13/19  1205 08/12/19  2328   K 3.0* 2.8* 2.7* 2.8* 2.4*          > K (8/16/2019, on admission) = 3.8   > Mg (8/16/2019, on admission) = 1.9   > Upon admission to the ARU, patient was given Klor Con 40 meq PO BID with meals x 2 days   > K (8/17/2019) = 3.5   > K (8/18/2019) = 3.7   > K (8/19/2019) = 3.5   > Continue Mg(OH)2 400 mg PO once daily    > Hypertensive heart and kidney disease without heart failure and with stage 2 chronic kidney disease   > Upon admission to the ARU, increased Hydralazine from 50 mg to 75 mg PO q 8 hr (6AM, 2PM, 10PM)   > Once work-up for primary hyperaldosteronism is complete, plan to start Spironolactone 25 mg PO once daily   > On 8/16/2019:    > Discontinued Metoprolol succinate 50 mg PO once daily (6AM)    > Started:     > Labetalol 200 mg PO q 12 hr (9AM, 9PM)     > Isosorbide dinitrate 10 mg P) q 8 hr (6AM, 2PM, 10PM)   > On 8/18/2019, added Hydralazine 25 mg PO TID PRN for SBP greater than 170 mmHg   > Continue:    > Amlodipine 10 mg PO once daily (9AM)    > Hydralazine 75 mg PO q 8 hr (6AM, 2PM, 10PM)    > Increase Labetalol from 200 mg to 300 mg PO q 12 hr (9AM, 9PM)    > Increase Isosorbide dinitrate from 10 mg to 20 mg PO q 8 hr (6AM, 2PM, 10PM)    > Hydralazine 25 mg PO TID PRN for SBP greater than 170 mmHg    > Pure hypercholesterolemia   > Lipid profile (8/13/2019) showed , , HDL 42,    > Lipid profile (8/14/2019) showed , , HDL 39, LDL 94   > Continue Atorvastatin 80 mg PO q HS    > Type 2 diabetes mellitus with stage 2 chronic kidney disease   > HbA1c (4/16/2014) = 6.2   > Patient has had chronic kidney disease even before his diagnosis of Type 2 diabetes mellitus and is presumed to be due to prolonged uncontrolled hypertension   > HbA1c (8/13/2019) = 6.6   > Continue Humalog insulin sliding scale SC TID AC    >  ? Primary hyperaldosteronism as etiology of chronic hypokalemia and difficult to control hypertension   > Endocrinology consult (Dr. Elisabet Tran) was called at [facility] prior to discharge/transfer to the ARU   > Aldosterone/Renin activity (8/16/2019): PENDING   > Once work-up for primary hyperaldosteronism is complete, plan to start Spironolactone 25 mg PO once daily   > Endocrinology consult (Dr. Camacho Goff):    > ASSESSMENT:     1. Chronic hypokalemia. 2.  Diabetes mellitus. 3.  Diabetic nephropathy with a stage II chronic renal disease. 4.  Normocytic anemia. 5.  Vitamin B12 deficiency. 6.  Borderline iron deficiency. 7.  Hypoalbuminemia.     > DISCUSSION:  The patient may well have hyperaldosteronism, but he may have it on the basis of his nephropathy, which is due to his underlying diabetes. The cause of his anemia might be a combination of B12 deficiency and iron deficiency together, so investigation should be done for this.     > PLAN: Laboratory studies:     1.  24-hour urine for aldosteronism. 2.  Methylmalonic acid level. 3.  Gastrin level. 4.  Microalbumin-to-creatinine ratio. 5.  Transferrin and prealbumin levels. > Leukocytosis, resolved   > WBC count (8/15/2019) = 11.7   > No fever documented since admission to the ARU   > WBC count (8/16/2019, on admission) = 17.9   > Work-up:    > Urinalysis (8/16/2019): WNL    > Chest x-ray (PA-Lateral) (8/16/2019) showed a minimally blunted left posterior costophrenic angle could be due to either a tiny effusion or chronic pleural reaction/scarring; mild cardiomegaly; atherosclerosis.    > WBC count (8/18/2019) = 11.6    > Iron deficiency anemia / Vitamin B12 anemia   > Anemia work-up (8/14/2019) showed serum iron 22, TIBC 371, iron % saturation 6, ferritin 34, reticulocyte count 1.4   > Vitamin B12 (8/14/2019) = 208   > Hgb/Hct (8/15/2019) = 10.1/31.3    > Hgb/Hct (8/16/2019, on admission) = 10.7/32.6   > Hgb/Hct (8/18/2019) = 8.7/27.4   > Hgb/Hct (8/19/2019) = 8.4/25.2   > Start:     > FeSO4 325 mg PO once daily with breakfast     > Ascorbic Acid 250 mg PO once daily with breakfast (to enhance the absorption of the FeSO4)     > Cyanocobalamin 1,000 mcg PO once daily    > Vitamin D Deficiency   > Vitamin D 25-Hydroxy (8/19/2019) = 16.2   > Cholecalciferol 50,000 units PO x 1 dose today   > Cholecalciferol 5,000 units PO once daily (starting tomorrow)     > Diet:   > On 8/16/2019, solids consistency was advanced from Mechanical soft (NDD 2) to Soft solids (NDD 3)   > Specifications: Diabetic, low saturated fat   > Solids (consistency): Soft solids (NDD 3)   > Liquids (consistency): Mildly thick ([place]-thick)       8. Patient's progress in rehabilitation and medical issues discussed with the patient. All questions answered to the best of my ability. Care plan discussed with patient, wife and nurse.       Signed:    Cori Medrano MD    August 19, 2019

## 2019-08-19 NOTE — ROUTINE PROCESS
[facility]    Bedside and Verbal shift change report given to Gorge Sow RN (oncoming nurse) by Tom Wakefield RN   (offgoing nurse). Report included the following information SBAR, Kardex, MAR and Recent Results.     Situation:   Code Status: Full Code   Reason for Admission: CVA  Hospital Day: 4   Problem List:   Hospital Problems  Date Reviewed: 8/16/2019          Codes Class Noted POA    Leukocytosis ICD-10-CM: D72.829  ICD-9-CM: 288.60  8/16/2019 Yes    Overview Signed 8/16/2019 11:19 AM by Marcio Alva MD     WBC count (8/16/2019) = 17.9             Hypertensive heart and kidney disease without heart failure and with stage 2 chronic kidney disease (Chronic) ICD-10-CM: I13.10, N18.2  ICD-9-CM: 404.90, 585.2  Unknown Yes        Chronic hypokalemia ICD-10-CM: E87.6  ICD-9-CM: 276.8  Unknown Yes    Overview Addendum 8/15/2019 11:31 AM by Marcio Alva MD     Persistent chronic hypokalemia + hypertension; ?primary hyperaldosteronism             Type 2 diabetes mellitus with stage 2 chronic kidney disease (HCC) (Chronic) ICD-10-CM: E11.22, N18.2  ICD-9-CM: 250.40, 585.2  Unknown Yes    Overview Signed 8/15/2019 12:26 PM by Marcio Alva MD     HbA1c (8/13/2019) = 6.6             Pure hypercholesterolemia (Chronic) ICD-10-CM: E78.00  ICD-9-CM: 272.0  8/15/2019 Yes    Overview Addendum 8/15/2019  6:27 PM by Marcio Alva MD     Lipid profile (8/13/2019) showed , , HDL 42, ; Lipid profile (8/14/2019) showed , , HDL 39, LDL 94             Current use of aspirin ICD-10-CM: Z79.82  ICD-9-CM: V58.66  8/14/2019 Yes        On statin therapy due to risk of future cardiovascular event ICD-10-CM: Z79.899  ICD-9-CM: V58.69  8/14/2019 Yes    Overview Signed 8/15/2019 12:21 PM by Marcio Alva MD     On Atorvastatin             Refusal of statin medication by patient ICD-10-CM: Z53.29  ICD-9-CM: V64.2  8/13/2019 Yes    Overview Signed 8/15/2019  2:01 PM by Fozia Webb MD     As per note of Neurology (Dr. Deb Dubose), patient refuses statin agent because of potential side effects.               * (Principal) Acute ischemic stroke [facility]) ICD-10-CM: I63.9  ICD-9-CM: 434.91  8/12/2019 Yes    Overview Signed 8/15/2019 12:17 AM by Fozia Webb MD     Acute Ischemic Stroke (acute/early subacute infarct at the left posterior basal ganglia to corona radiata) with residual right hemiparesis, dysphagia and dysarthria             Impaired mobility and ADLs ICD-10-CM: Z74.09  ICD-9-CM: 799.89  8/12/2019 Yes        Received intravenous tissue plasminogen activator (tPA) in emergency department ICD-10-CM: Z92.82  ICD-9-CM: V45.88  8/12/2019 Yes        Right hemiparesis ([facility] [address] ICD-10-CM: G81.91  ICD-9-CM: 342.90  8/12/2019 Yes        Dysphagia ICD-10-CM: R13.10  ICD-9-CM: 787.20  8/12/2019 Yes        Dysarthria ICD-10-CM: R47.1  ICD-9-CM: 784.51  8/12/2019 Yes              Background:   Past Medical History:   Past Medical History:   Diagnosis Date    Acute ischemic stroke ([facility] [address] 8/12/2019    Acute Ischemic Stroke (acute/early subacute infarct at the left posterior basal ganglia to corona radiata) with residual right hemiparesis, dysphagia and dysarthria    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     On Allopurinol    Chronic hypokalemia     Persistent chronic hypokalemia + hypertension; ?primary hyperaldosteronism    CKD (chronic kidney disease) stage 2, GFR 60-89 ml/min 8/15/2019    Current use of aspirin 8/14/2019    Dysarthria 8/12/2019    Dysphagia 8/12/2019    Elevated prostate specific antigen (PSA) 7/21/2011    First degree atrioventricular block by electrocardiogram 8/12/2019    Hypertensive heart and kidney disease without heart failure and with stage 2 chronic kidney disease     Obesity, Class I, BMI 30-34.9     Obstructive sleep apnea on CPAP     On statin therapy due to risk of future cardiovascular event 8/14/2019    On Atorvastatin    Pure hypercholesterolemia 08/15/2019    Lipid profile (8/13/2019) showed , , HDL 42, ; Lipid profile (8/14/2019) showed , , HDL 39, LDL 94    Received intravenous tissue plasminogen activator (tPA) in emergency department 8/12/2019    Refusal of statin medication by patient 8/13/2019    As per note of Neurology (Dr. Shantal Purcell), patient refuses statin agent because of potential side effects.  Right hemiparesis ([facility] 8/12/2019    Type [facility] diabetes mellitus with [facility]     HbA1c (8/13/2019) = 6.6      Patient taking anticoagulants yes Heparin, ASA   Patient has a defibrillator: no     Assessment:   Changes in Assessment throughout shift: No     Patient has central line: no Reasons if yes: Insertion date: Last dressing date:   Patient has Boykin Cath: no Reasons if yes:     Insertion date:     Last Vitals:     Vitals:    08/18/19 1457 08/18/19 1624 08/18/19 2100 08/18/19 2206   BP: 166/76 144/72 (!) 192/92 152/72   Pulse: 88 74 82 73   Resp: 18 19 18    Temp: 97.1 °F (36.2 °C)  98.4 °F (36.9 °C)    SpO2: 95%  95%    Weight:       Height:            PAIN    Pain Assessment    Pain Intensity 1: 0 (08/19/19 0000) Pain Intensity 1: 2 (12/29/14 1105)      Pain Location 1: Abdomen      Pain Intervention(s) 1: Medication (see MAR)  Patient Stated Pain Goal: 0 Patient Stated Pain Goal: 0  o Intervention effective: no    o Other actions taken for pain:      Skin Assessment  Skin color Skin Color: Appropriate for ethnicity  Condition/Temperature Skin Condition/Temp: Dry, Warm  Integrity Skin Integrity: Tear  Turgor Turgor: Non-tenting  Weekly Pressure Ulcer Documentation  Pressure  Injury Documentation: No Pressure Injury Noted-Pressure Ulcer Prevention Initiated  Wound Prevention & Protection Methods  Orientation of wound Orientation of Wound Prevention: Posterior  Location of Prevention Location of Wound Prevention: Buttocks, Sacrum/Coccyx  Dressing Present Dressing Present : Yes  Dressing Status Dressing Status: Intact  Wound Offloading Wound Offloading (Prevention Methods): Bed, pressure redistribution/air     INTAKE/OUPUT  Date 08/18/19 0700 - 08/19/19 0659 08/19/19 0700 - 08/20/19 0659   Shift 5041-8841 1098-7159 24 Hour Total 0439-8997 7340-7822 24 Hour Total   INTAKE   P.O. 240  240        P. O. 240  240      Shift Total(mL/kg) 240(2.4)  240(2.4)      OUTPUT   Urine(mL/kg/hr)  600 600        Urine Voided  600 600        Urine Occurrence(s) 5 x 4 x 9 x      Stool           Stool Occurrence(s) 1 x 0 x 1 x      Shift Total(mL/kg)  600(5.9) 600(5.9)       -600 -360      Weight (kg) 102 102 102 102 102 102       Recommendations:  1. Patient needs and requests:     2. Diet: Cardiac Mech Soft Nectar Thick     3. Pending tests/procedures:      4. Functional Level/Equipment:     5. Estimated Discharge Date: 8/24/19 Posted on Whiteboard in Patients Room: yes     HEALS Safety Check    A safety check occurred in the patient's room between off going nurse and oncoming nurse listed above. The safety check included the below items  Area Items   H  High Alert Medications - Verify all high alert medication drips (heparin, PCA, etc.)   E  Equipment - Suction is set up for ALL patients (with yanker)  - Red plugs utilized for all equipment (IV pumps, etc.)  - WOWs wiped down at end of shift.  - Room stocked with oxygen, suction, and other unit-specific supplies   A  Alarms - Bed alarm is set for fall risk patients  - Ensure chair alarm is in place and activated if patient is up in a chair   L  Lines - Check IV for any infiltration  - Boykin bag is empty if patient has a Boykin   - Tubing and IV bags are labeled   S  Safety   - Room is clean, patient is clean, and equipment is clean. - Hallways are clear from equipment besides carts.    - Fall bracelet on for fall risk patients  - Ensure room is clear and free of clutter  - Suction is set up for ALL patients (with diann)  - Hallways are clear from equipment besides carts.    - Isolation precautions followed, supplies available outside room, sign posted

## 2019-08-19 NOTE — PROGRESS NOTES
Problem: Dysphagia (Adult)  Goal: *Speech Goal: (INSERT TEXT)  Description  Long term goals  Patient will:  1. Tolerate regular diet with thin liquids using safe swallowing techniques without s/s of aspiration in 5/6 meals. 2. Use safe swallowing techniques (including slowed rate, small bites/sips, alternate liquids/solids, effortful swallow, head rotation to right for liquids) with supervision. 3. Participate in MBS study prior to advancing to thin liquids to assure safety (no aspiration). 4. Perform oral and pharyngeal strengthening exercises (to improve speech and swallowing) with supervision-modified independence. 5. Demonstrate 90% intelligibility in conversation using appropriate speaking strategies (slowed rate, overarticulation, increased volume). 6. Recall 3 words after 5 minutes with supervision. 7. Perform functional problem solving/reasoning tasks with 90% accuracy. 8. Name 8-10 items in categories of increasing abstraction, supervision. Short term goals (by 8/23/19)  Patient will:   1. Tolerate soft diet with nectar thick liquids using safe swallowing techniques without s/s of aspiration in 5/6 meals. 2. Use safe swallowing techniques (including slowed rate, small bites/sips, alternate liquids/solids, effortful swallow, head rotation to right for liquids) with min cues. 3. Tolerate small amounts of ice chips with the SLP using head rotation to the right and/or chin tuck, without overt s/s of aspiration. 4. Perform oral and pharyngeal strengthening exercises (to improve speech and swallowing) with mod-min assist.  5. Demonstrate 90% intelligibility in single words of up to 4 syllables using appropriate speaking strategies (slowed rate, overarticulation, increased volume). 6. Recall 3 words after 5 minutes with mod assist.  7. Perform functional problem solving/reasoning tasks with 75-85% accuracy. 8. Name 8-10 items in concrete categories, supervision.      Note:   SPEECH LANGUAGE PATHOLOGY TREATMENT    Patient: Sarah Webb (72 y.o. male)  Date: 8/19/2019  Diagnosis: Acute ischemic stroke ([facility] [address] [I63.9] Acute ischemic stroke ([facility] [address]       Time in: 0830 0930 1230  Time Out:  0900 1000 1300  SUBJECTIVE:   Patient stated Sebas Webb will thy bring my food? . OBJECTIVE:   Mental Status:  Mr. Reagan Dumont was awake, alert and engaged in treatment tasks today. Treatment & Interventions:   Patient was seen in the dining room at mealtime for breakfast and lunch today. He is quite impulsive with self feeding, taking large bites/sips and eating rapidly. SLP provided mod-max cues to slow rate and reduce bolus size. This afternoon his wife was present for lunch and he also needed cues to refrain from talking with food still in his mouth. Despite his impulsivity, patient coughed only once this morning at breakfast.  This occurred after a large bolus of the nectar thick liquid (as SLP cued him to sip). He will need strict supervision at all meals. Mr. Reagan Dumont was also seen for a thirty minute speech therapy session this morning. At this time swallowing strategies were reviewed with him and the rationale for each discussed. The following treatment tasks were also presented: Motor Speech:   Facial exercises:  Presented and reviewed x 3-5 each  Laryngeal exercises:  Presented and reviewed x 3-5      Patient sustained vowel sounds for 16-17 seconds      Good pitch range in exercises    Neuro-Linguistics:   Orientation:   Min assist  Recent memory:  Supervision  Recall 3 words:  Supervision  ID 1st action needed:  90% accuracy    Response & Tolerance to Activities:  Mr. Reagan Dumont was cooperative in therapy. However, when eating, he needs mod-max assist/cues for use of safe swallowing techniques. He is very impulsive with self feeding which, according to his wife, is new since the onset of the CVA. Pain:  No report of pain in any session.      After treatment:   ?       Patient left in no apparent distress sitting up in chair  ? Patient left in no apparent distress in bed  ? Call bell left within reach  ? Nursing notified  ? Caregiver present  ? Bed alarm activated    ASSESSMENT:   Progression toward goals:  ?       Improving appropriately and progressing toward goals  ? Improving slowly and progressing toward goals  ? Not making progress toward goals and plan of care will be adjusted    PLAN:   Patient continues to benefit from skilled intervention to address the above impairments. Continue treatment per established plan of care.   Discharge Recommendations:  Home Health    Estimated LOS: Through 9/12/19    Army Meckel, SLP  Time Calculation:  90 Minutes

## 2019-08-19 NOTE — PROGRESS NOTES
Problem: Mobility Impaired (Adult and Pediatric)  Goal: *Acute Goals and Plan of Care (Insert Text)  Description  Physical Therapy Short Term Goals  Initiated 2019 and to be accomplished within 14 day(s)  1. Patient will move from supine to sit and sit to supine  in bed with minimal assistance/contact guard assist.     2.  Patient will transfer from bed to chair and chair to bed with minimal/moderate assistance using the least restrictive device. 3.  Patient will perform sit to stand with minimal assistance/moderate assistance. 4.  Patient will ambulate with moderate assistance  for 10 feet with the least restrictive device. Physical Therapy Goals  Initiated 2019 and to be accomplished within 4 weeks  1. Patient will move from supine to sit and sit to supine  in bed with supervision/set-up. 2.  Patient will transfer from bed to chair and chair to bed with supervision/set-up using the least restrictive device. 3.  Patient will perform sit to stand with supervision/set-up. 4.  Patient will ambulate with minimal assistance for 50 feet with the least restrictive device. 5.  Patient will ascend/descend 2 stairs with 1 handrail(s) with moderate assistance . Outcome: Progressing Towards Goal   PHYSICAL THERAPY TREATMENT    Patient: Sagar Ryder (77 y.o. male)  Date: 2019  Diagnosis: Acute ischemic stroke [facility]) [I63.9] Acute ischemic stroke [facility])  Precautions: Aspiration, NWB  Chart, physical therapy assessment, plan of care and goals were reviewed. Time In: 1445  Time Out: 1615  Patient Seen For: Balance activities;Gait training;Patient education; Therapeutic exercise;Transfer training;PM  Pain:  Pt pain was reported as  0 pre-treatment. Pt pain was reported as 0 post-treatment. Intervention: n/a    Patient identified with name and :yes    SUBJECTIVE:     \"I just want to get better. \"    OBJECTIVE DATA SUMMARY:   Objective:     GROSS ASSESSMENT Daily Assessment BED/MAT MOBILITY Daily Assessment    Rolling Right : 5 (Supervision)  Supine to Sit : 4 (Minimal assistance)  Sit to Supine : 4 (Minimal assistance) assistance needed to raise right LE       TRANSFERS Daily Assessment    Transfer Type: SPT without device  Transfer Assistance : 3 (Moderate assistance )  Sit to Stand Assistance: Moderate assistance(occasional min A)  Pt performed numerous sit to stand transfers and fluctuated between min A and mod A as he fatigued       GAIT Daily Assessment    Amount of Assistance: 3 (Moderate assistance)  Distance (ft): 5 Feet (ft)  Assistive Device: Cane, quad  Assistance needed for weight shifting, progressing R LE and preventing it from adducting too far and verbal cues for slow and controlled movements       STEPS or STAIRS Daily Assessment     NT       BALANCE Daily Assessment    Sitting - Static: Good (unsupported)  Sitting - Dynamic: Fair (occasional)  Standing - Static: (fair-)  Standing - Dynamic : Impaired       WHEELCHAIR MOBILITY Daily Assessment    Functional Level: 6       THERAPEUTIC EXERCISES Daily Assessment    Extremity: Right  Exercise Type #1: Supine lower extremity strengthening  Sets Performed: 2  Reps Performed: 10  Level of Assist: Minimal assistance       Neuro Re-Education:  Sit to stand performed with left foot on 2 inch platform step to force weight bearing onto the right LE during sit to stand transfer. After several trials patient was demonstrating more even weight distribution during sit to stand when step was taken away. Tactile stimulation provided in standing to prevent right knee from hyperextending and increasing quad contraction to maintain control. ASSESSMENT:  Pt demonstrated increased muscle control during sit to stand transfers and increased independence with bed mobility. Pt continues to require cues to slow down and use controlled movement and to prevent compensation techniques.   Progression toward goals:  ?      Improving appropriately and progressing toward goals  ? Improving slowly and progressing toward goals  ? Not making progress toward goals and plan of care will be adjusted     PLAN:  Patient continues to benefit from skilled intervention to address the above impairments. Continue treatment per established plan of care. Discharge Recommendations:  Home Health  Further Equipment Recommendations for Discharge:  TBD closer to discharge      Estimated LOS: 4 weeks    Activity Tolerance:   fair  Please refer to the flowsheet for vital signs taken during this treatment.     After treatment:   Patient left in room sitting in w/c with wife present      Kumar Kam, PT  8/19/2019

## 2019-08-19 NOTE — REHAB NOTE
[facility] PHYSICAL REHABILITATION  [address] [place] [address], [address]     [address]  OVERALL PLAN OF CARE    Name: Tasha Scanlon CSN: [id number]   Age: 71 y.o. MRN: [id number]   Sex: male Admit Date: 8/15/2019     Primary Rehabilitation Diagnosis  1. Impaired Mobility and ADLs  2. Acute Ischemic Stroke (acute/early subacute infarct at the left posterior basal ganglia to corona radiata) with residual right hemiparesis, dysphagia and dysarthria     Comorbidities   Obesity, Class I, BMI 30-34.9 E66.9    Obstructive sleep apnea on CPAP G47.33, Z99.89    Hypertensive heart and kidney disease without heart failure and with stage 2 chronic kidney disease I13.10, N18.2    Chronic hypokalemia E87.6    CKD (chronic kidney disease) stage 2, GFR 60-89 ml/min N18.2    Current use of aspirin Z79.82    On statin therapy due to risk of future cardiovascular event Z79.899    Type 2 diabetes mellitus with stage 2 chronic kidney disease  E11.22, N18.2    Pure hypercholesterolemia E78.00    Elevated prostate specific antigen (PSA) R97.20    Refusal of statin medication by patient Z48.34    First degree atrioventricular block by electrocardiogram I44.0    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 20X0    Leukocytosis D72.829        ANTICIPATED INTERVENTIONS THAT SUPPORT THE MEDICAL NECESSITY OF THIS ADMISSION:    I. Physical Therapy              A. Intensity: 1 hour per day              B. Frequency: 5 times per week              C. Duration: 4 weeks              D. Long Term Goals:    1. Patient will move from supine to sit and sit to supine  in bed with supervision/set-up. 2.  Patient will transfer from bed to chair and chair to bed with supervision/set-up using the least restrictive device. 3.  Patient will perform sit to stand with supervision/set-up. 4.  Patient will ambulate with minimal assistance for 50 feet with the least restrictive device.      5.  Patient will ascend/descend 2 stairs with 1 handrail(s) with moderate assistance . E. Interventions: Interventions may include range of motion (AROM, PROM B LE/trunk), motor function (B LE/trunk strengthening/coordination), activity tolerance (vitals, oxygen saturation levels), bed mobility training, balance activities, gait training (progressive ambulation program), wheelchair management and functional transfer training. II. Occupational Therapy  21 . Intensity: 1 hour per day              B. Frequency: 5 times per week              C. Duration: 4 weeks              D. Long Term Goals:    1. Pt will perform self-feeding with MI.    2. Pt will perform grooming with Set-up. 3. Pt will perform UB bathing with SBA. 4. Pt will perform LB bathing with Luba/CGA. 5. Pt will perform tub/shower transfer with Luba/CGA using LRAD. 6. Pt will perform UB dressing with Set-up. 7. Pt will perform LB dressing with Luba/CGA. 8. Pt will perform toileting task with Luba/CGA. 9. Pt will perform toilet transfer with Luba/CGA using LRAD. E. Interventions: Interventions may include range of motion (AROM, PROM B UE), motor function (B UE/ strengthening/coordination), activity tolerance (vitals, oxygen saturation levels), balance training, ADL/IADL training and functional transfer training. III. Speech Therapy              A. Intensity: 1-2 times per day              B. Frequency: 4-7 times per week              C. Duration: 3 weeks              D. Long Term Goals:    1. Tolerate regular diet with thin liquids using safe swallowing techniques without s/s of aspiration in 5/6 meals. 2. Use safe swallowing techniques (including slowed rate, small bites/sips, alternate liquids/solids, effortful swallow, head rotation to right for liquids) with supervision. 3. Participate in MBS study prior to advancing to thin liquids to assure safety (no aspiration).     4. Perform oral and pharyngeal strengthening exercises (to improve speech and swallowing) with supervision-modified independence. 5. Demonstrate 90% intelligibility in conversation using appropriate speaking strategies (slowed rate, overarticulation, increased volume). 6. Recall 3 words after 5 minutes with supervision. 7. Perform functional problem solving/reasoning tasks with 90% accuracy. 8. Name 8-10 items in categories of increasing abstraction, supervision. E. Interventions: SLP will follow daily and in dining group per the SLP plan of care. PHYSICIAN'S ASSESSMENT OF FINDINGS:    Are the established goals sufficient for achieving the optimal level of function? [x]  Yes      []  No    What changes would you recommend to the goals as written? None      Are the interventions noted sufficient for achieving the optimal level of function? [x]  Yes      []  No    What changes would you recommend to the interventions noted? If therapy staff is unable to provide 3 hr of total therapy per day in 5 days due to medical issues or decreased patient tolerance, may modify treatment schedule to 15 hr/week.       Estimated length of stay: 2-3 weeks      Medical rehabilitation prognosis:    []  Excellent     [x]  Good     []  Fair     []  Guarded       Discharge Destination:     [x]  Home     []  [address]     []  [place]     []  [address]     []  [place]     []  Other:       Signed:    Selwyn Peoples MD    August 17, 2019

## 2019-08-19 NOTE — INTERDISCIPLINARY ROUNDS
[facility] PHYSICAL REHABILITATION  [address] [place] [address], [address]    INPATIENT REHABILITATION  PRE-TEAM CONFERENCE SUMMARY     Date of Conference: 8/20/19    Patient Information:        Name: Savi Carrasco Age / Sex: 71 y.o. / male   CSN: [id number] MRN: [id number]   [facility] Date: 8/15/2019 Length of Stay: 4 days     Primary Rehabilitation Diagnosis  1. Impaired Mobility and ADLs  2. Acute Ischemic Stroke (acute/early subacute infarct at the left posterior basal ganglia to corona radiata) with residual right hemiparesis, dysphagia and dysarthria     Comorbidities   Obesity, Class I, BMI 30-34.9 E66.9    Obstructive sleep apnea on CPAP G47.33, Z99.89    Hypertensive heart and kidney disease without heart failure and with stage 2 chronic kidney disease I13.10, N18.2    Chronic hypokalemia E87.6    CKD (chronic kidney disease) stage 2, GFR 60-89 ml/min N18.2    Current use of aspirin Z79.82    On statin therapy due to risk of future cardiovascular event Z79.899    Type 2 diabetes mellitus with stage 2 chronic kidney disease  E11.22, N18.2    Pure hypercholesterolemia E78.00    Elevated prostate specific antigen (PSA) R97.20    Refusal of statin medication by patient Z48.34    First degree atrioventricular block by electrocardiogram I44.0    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 20X0    Leukocytosis D72.829    Iron deficiency anemia D50.9    Vitamin B12 anemia D51.9    Vitamin D deficiency E55. 9          Therapy:     FIM SCORES Initial Assessment Weekly Progress Assessment 8/19/2019   Eating Functional Level: 5  Comments: Pt reporting requiring set-up of container mgmt for self feeding. Pt able to use dominant left hand to manage utensils.   5 (S/U)   Swallowing   3-Mod+ cues   Grooming 3  3   Bathing 2  2      Upper Body Dressing Functional Level: 4  Items Applied/Steps Completed: Pullover (4 steps)  Comments: Pt educated on karthik technique to cecily shirt with Michael overall.  Pt doffed shirt with SBA. 4   Lower Body Dressing Functional Level: 2  Items Applied/Steps Completed: Elastic waist pants (3 steps), Sock, left (1 step), Sock, right (1 step), Underpants (3 steps)  Comments: Pt doffed socks with modA, donned socks with TA. LB clothing score reflected from pt performing toileting task, requiring maxA for clothing mgmt   2   Toileting Functional Level: 2  Comments: Pt able to initiate to wipe but requiring MaxA for thoroughness. Pt required maxA overall for clothing mgmt with pt initiating to assist. Pt encouraged to focus on steadying balance in standing with additional person present assisting for clothing and buttocks cleansing. 2   Bladder - level of assist 5     Bladder - accident frequency score 6     Bowel - level of assist 2     Bowel - accident frequency score 6     Toilet Transfer Emanuel Toilet Transfer Score: 1  Comments: modA x 2. Second staff member present for safety and (A) with initial sit<>stand. 3   Tub/Shower Transfer Emanuel Tub or Shower Type: Tub/Shower combination  Tub/Shower Transfer Score: 0  Comments: NT 2/2 to safety and decreased functional transfer (I).  Bathing performed at sink this AM     0   Comprehension Primary Mode of Comprehension: Auditory  Score: 4     5   Expression Primary Mode of Expression: Verbal  Score: 4  Comments: Pt requiring increased time and repetitions to make needs known 2/2 to facial droop impeding expression      4   Social Interaction Score: 4  Comments: Pt interacting appropriately with OT   5   Problem Solving Score: 4  Comments: Pt requiring Michael for problem solving body mechanics prior to toileting transfer   3   Memory Score: 4  4     FIM SCORES Initial Assessment Weekly Progress Assessment 8/19/2019   Bed/Chair/Wheelchair Transfers Transfer Type: (stand step without assistive device)  Other: stand step txfr without AD  Transfer Assistance : 2 (Maximal assistance)  Sit to Stand Assistance: Maximum assistance Transfer Type: SPT without device  Transfer Assistance : 3 (Moderate assistance )  Sit to Stand Assistance:  Moderate assistance(occasional min A)   Bed Mobility Rolling Right 6 (Modified independent)   Rolling Left 3 (Moderate assistance )   Supine to Sit 3 (Moderate assistance)   Sit to Stand Maximum assistance   Sit to Supine 3 (Moderate assistance)    Rolling Right   5 (Supervision)   Rolling Left       Supine to Sit   4 (Minimal assistance)   Sit to Stand   Moderate assistance(occasional min A)   Sit to Supine   4 (Minimal assistance)      Locomotion (W/C) Able to Propel (ft): 300 feet  Functional Level: 5  Curbs/Ramps Assist Required (FIM Score): 0 (Not tested)  Wheelchair Setup Assist Required : 3 (Moderate assistance)(for right brake and leg rest)  Wheelchair Management: Manages left brake Function 6  Setup Assistance    propels >300 feet mod I after set up  Assistance needed for right brake, leg rest and arm tray     Locomotion (W/C distance)       Locomotion (Walk) 2 (Maximal assistance) 3 (Moderate assistance)  Cane, quad   Locomotion (Walk dist.) 3 Feet (ft) 5 Feet (ft)   Steps/Stairs             Nursing:     Neuro:   AAA&O x 4           Respiratory:   [x] WNL   [] O2 LPM:   Other:  Peripheral Vascular:   [] TEDS present   [] Edema present ____ Grade   Cardiac:   [x] WNL   [] Other  Genitourinary:   [x] continent   [] incontinent   [] perkins  Abdominal 8/19/19_______ LBM  GI: _cardiac soft______ Diet _nectar thick_____ Liquids _____ tube feeds  Musculoskeletal: ____ ROM Transfers _____ Assistive Device Used  _mod___ Level of Assistance  Skin Integumentary:   [x] Intact   [] Not Intact   __________Preventative Measures  Details______________________________________________________________  Pain: [x] Controlled   [] Not Controlled   Pain Meds:   [] Scheduled   [] PRN        Registered Dietitian / Nutrition:     Patient Vitals for the past 100 hrs:   % Diet Eaten   08/19/19 1338 100 %   08/19/19 0903 100 % 08/18/19 0959 100 %   08/17/19 1353 100 %   08/17/19 0956 100 %   08/16/19 1755 100 %   08/16/19 1325 80 %   08/16/19 0945 100 %   08/15/19 1837 180 %     Pt continues with good meal intake/appetite. Tolerating diet. Denied having nutritional questions or concerns at this time. Supplements:          [] Yes   [x] No      Amount of supplement consumed: not applicable      Intake/Output Summary (Last 24 hours) at 8/19/2019 1712  Last data filed at 8/19/2019 1338  Gross per 24 hour   Intake 720 ml   Output 900 ml   Net -180 ml                                Last bowel movement: 8/18      Interdisciplinary Team Goals:     1. Discipline  Physical Therapy    Goal  To perform standing transfers at a min A level without using compensatory strategies    Barrier  decreased strength, balance, and coordination    Intervention  transfer training, therapeutic exercises, therapeutic activities, neuro re-education    Goal written by:   Alem Murphy, PT     2. Discipline  Occupational Therapy    Goal  Pt will participate in therapy session requiring <2x v/c's for slowing pace and engaging in energy conservation for increased right UE safety and (I) activity pacing/monitoring     Barrier  Impulsiveness, decreased insight into barriers, decreased energy conversation awareness    Intervention  Education, repetition    Goal written by:  Amirah Loaiza, OTR/L     3. Discipline  Speech Therapy    Goal  Patient will use safe swallowing strategies for all PO intake with min cues and no overt s/s of aspiration in 5/6 meals. Barrier  Dysphagia, mild cognitive impairment, mild dysarthria    Intervention  Oral/pharyngeal strengthening exercises, speech drill, training in safe swallowing techniques, monitor ad advance diet as warranted, MBS as needed to advance liquid consistency    Goal written by:  Julissa Nava CCC-SLP     4. Discipline  Nursing    Goal  Pt. Will remain free of pressure ulcers by discharge.     Barrier  Acute Ischemic stroke, right sided hemipaeresis    Intervention  Inspect skin routinely, Instruct pt. On repositioning at least every 2 hrs. Goal written by:  Meeta Banegas RN     5. Discipline  Clinical Psychology    Goal  Increase insight about stroke occurrence and recovery    Barrier  Limited insight about all parameters of recovery    Intervention  Patient/stroke education    Goal written by:  Hussain Chan, PhD     6. Discipline  Nutrition / Dietetics    Goal  Po intake of meals will meet >75% of patient estimated nutritional needs within the next 5-7 days. Outcome:  [x] Met/Ongoing    [] Progressing towards goal    [] Not progressing towards goal    [] New/Initial goal       Barrier  none known    Intervention  continue po diet    Goal written by:  Carol Hardy RD       Disposition / Discharge Planning: Follow-up services:  [x] Physical Therapy             [x] Occupational Therapy       [x] Speech Therapy           [] Skilled Nursing      [] Medical Social Worker   [] Aide        [] Outpatient     [x] Home Health   [] [facility]   [] [place]   [facility] recommendations:     Estimated discharge date:  9/12/19   Discharge Location:  [] Home   [] [facility]   [] [facility]             [] Other:          Electronic Signatures:      Signature Date Signed   Physical Therapist    Tracie Marroquin, PT  8/19/19   Occupational Therapist   Paolo Rendon, OTR/L 8/19/19   Speech Therapist   Kiran Tom, [address] 8/19/19   Recreational Therapist         Joseluis Mcallister RN  8/19/19   Dietitian    Carol Hardy RD 08/19/2019    Clinical Psychologist    Hussain Chan, PhD 8/19/19    Physician    Austyn Burch MD   8/19/2019        TEAGAN Guzman  8/19/19         The above information has been reviewed with the patient in a language that they can understand.  Opportunity for comments and questions has been provided and a signed attestation has been scanned into the \"media tab\" of the EMR.       Patient Signature: ______________________________________________________    Date Signed: __________________________________________________________

## 2019-08-20 LAB
FSH SERPL-ACNC: 5.6 MIU/ML
GLUCOSE BLD STRIP.AUTO-MCNC: 118 MG/DL (ref 70–110)
GLUCOSE BLD STRIP.AUTO-MCNC: 123 MG/DL (ref 70–110)
GLUCOSE BLD STRIP.AUTO-MCNC: 123 MG/DL (ref 70–110)
GLUCOSE BLD STRIP.AUTO-MCNC: 130 MG/DL (ref 70–110)
LH SERPL-ACNC: 6.8 MIU/ML
TESTOST FREE SERPL-MCNC: 7.3 PG/ML (ref 6.6–18.1)
TESTOST SERPL-MCNC: 376 NG/DL (ref 264–916)
THYROPEROXIDASE AB SERPL-ACNC: 8 IU/ML (ref 0–34)

## 2019-08-20 PROCEDURE — 97530 THERAPEUTIC ACTIVITIES: CPT

## 2019-08-20 PROCEDURE — 82962 GLUCOSE BLOOD TEST: CPT

## 2019-08-20 PROCEDURE — 74011250637 HC RX REV CODE- 250/637: Performed by: INTERNAL MEDICINE

## 2019-08-20 PROCEDURE — 97110 THERAPEUTIC EXERCISES: CPT

## 2019-08-20 PROCEDURE — 82088 ASSAY OF ALDOSTERONE: CPT

## 2019-08-20 PROCEDURE — 65310000000 HC RM PRIVATE REHAB

## 2019-08-20 PROCEDURE — 82941 ASSAY OF GASTRIN: CPT

## 2019-08-20 PROCEDURE — 97535 SELF CARE MNGMENT TRAINING: CPT

## 2019-08-20 PROCEDURE — 74011250636 HC RX REV CODE- 250/636: Performed by: INTERNAL MEDICINE

## 2019-08-20 PROCEDURE — 81050 URINALYSIS VOLUME MEASURE: CPT

## 2019-08-20 PROCEDURE — 97112 NEUROMUSCULAR REEDUCATION: CPT

## 2019-08-20 PROCEDURE — 92526 ORAL FUNCTION THERAPY: CPT

## 2019-08-20 PROCEDURE — 92507 TX SP LANG VOICE COMM INDIV: CPT

## 2019-08-20 PROCEDURE — 36415 COLL VENOUS BLD VENIPUNCTURE: CPT

## 2019-08-20 RX ADMIN — HYDRALAZINE HYDROCHLORIDE 75 MG: 50 TABLET, FILM COATED ORAL at 13:59

## 2019-08-20 RX ADMIN — ISOSORBIDE DINITRATE 30 MG: 20 TABLET ORAL at 06:44

## 2019-08-20 RX ADMIN — LABETALOL HYDROCHLORIDE 400 MG: 100 TABLET, FILM COATED ORAL at 21:45

## 2019-08-20 RX ADMIN — CYANOCOBALAMIN TAB 1000 MCG 1000 MCG: 1000 TAB at 09:35

## 2019-08-20 RX ADMIN — HEPARIN SODIUM 5000 UNITS: 5000 INJECTION INTRAVENOUS; SUBCUTANEOUS at 06:44

## 2019-08-20 RX ADMIN — HYDRALAZINE HYDROCHLORIDE 75 MG: 50 TABLET, FILM COATED ORAL at 21:46

## 2019-08-20 RX ADMIN — Medication 5000 UNITS: at 09:34

## 2019-08-20 RX ADMIN — ISOSORBIDE DINITRATE 30 MG: 20 TABLET ORAL at 21:46

## 2019-08-20 RX ADMIN — Medication 400 MG: at 09:35

## 2019-08-20 RX ADMIN — HEPARIN SODIUM 5000 UNITS: 5000 INJECTION INTRAVENOUS; SUBCUTANEOUS at 21:46

## 2019-08-20 RX ADMIN — AMLODIPINE BESYLATE 10 MG: 10 TABLET ORAL at 09:35

## 2019-08-20 RX ADMIN — ISOSORBIDE DINITRATE 30 MG: 20 TABLET ORAL at 13:59

## 2019-08-20 RX ADMIN — LABETALOL HYDROCHLORIDE 400 MG: 100 TABLET, FILM COATED ORAL at 09:35

## 2019-08-20 RX ADMIN — HYDRALAZINE HYDROCHLORIDE 75 MG: 50 TABLET, FILM COATED ORAL at 06:43

## 2019-08-20 RX ADMIN — ASPIRIN 81 MG 81 MG: 81 TABLET ORAL at 09:35

## 2019-08-20 RX ADMIN — Medication 250 MG: at 09:34

## 2019-08-20 RX ADMIN — ATORVASTATIN CALCIUM 80 MG: 40 TABLET, FILM COATED ORAL at 21:46

## 2019-08-20 RX ADMIN — FERROUS SULFATE TAB 325 MG (65 MG ELEMENTAL FE) 325 MG: 325 (65 FE) TAB at 09:35

## 2019-08-20 RX ADMIN — HEPARIN SODIUM 5000 UNITS: 5000 INJECTION INTRAVENOUS; SUBCUTANEOUS at 13:59

## 2019-08-20 NOTE — PROGRESS NOTES
Problem: Mobility Impaired (Adult and Pediatric)  Goal: *Acute Goals and Plan of Care (Insert Text)  Description  Physical Therapy Short Term Goals  Initiated 2019 and to be accomplished within 14 day(s)  1. Patient will move from supine to sit and sit to supine  in bed with minimal assistance/contact guard assist.     2.  Patient will transfer from bed to chair and chair to bed with minimal/moderate assistance using the least restrictive device. 3.  Patient will perform sit to stand with minimal assistance/moderate assistance. 4.  Patient will ambulate with moderate assistance  for 10 feet with the least restrictive device. Physical Therapy Goals  Initiated 2019 and to be accomplished within 4 weeks  1. Patient will move from supine to sit and sit to supine  in bed with supervision/set-up. 2.  Patient will transfer from bed to chair and chair to bed with supervision/set-up using the least restrictive device. 3.  Patient will perform sit to stand with supervision/set-up. 4.  Patient will ambulate with minimal assistance for 50 feet with the least restrictive device. 5.  Patient will ascend/descend 2 stairs with 1 handrail(s) with moderate assistance . Outcome: Progressing Towards Goal   PHYSICAL THERAPY TREATMENT    Patient: Sarah Webb (34 y.o. male)  Date: 2019  Diagnosis: Acute ischemic stroke [facility]) [I63.9] Acute ischemic stroke [facility])  Precautions: Aspiration, NWB  Chart, physical therapy assessment, plan of care and goals were reviewed. Time In: 0800  Time Out: 0900  Pain:  Pt pain was reported as  0 pre-treatment. Pt pain was reported as 0 post-treatment. Intervention: n/a    Patient identified with name and :yes    SUBJECTIVE:     \"I feel steadier. \"    OBJECTIVE DATA SUMMARY:   Objective:     GROSS ASSESSMENT Daily Assessment            BED/MAT MOBILITY Daily Assessment    Supine to Sit : 4 (Minimal assistance)(on the right side)  Sit to Supine : 5 (Supervision) TRANSFERS Daily Assessment    Other: stand step without device  Transfer Assistance : 3 (Moderate assistance )  Sit to Stand Assistance: Minimal assistance(to mod A)       GAIT Daily Assessment     NT       STEPS or STAIRS Daily Assessment     NT       BALANCE Daily Assessment    Sitting - Static: Good (unsupported)  Sitting - Dynamic: Fair (occasional)  Standing - Static: (fair-)  Standing - Dynamic : Impaired  Standing weight shifting and pre-gait activities performed with QC. Pt stepped forward and back with right LE, min/mod A required to properly position right foot and tactile cues to promote right knee extension prior to weight shifting after stepping forward       WHEELCHAIR MOBILITY Daily Assessment     Mod I >300 feet  Assistance needed with Right leg rest       THERAPEUTIC EXERCISES Daily Assessment    Extremity: Right  Exercise Type #1: Supine lower extremity strengthening  Sets Performed: 2  Reps Performed: 10  Level of Assist: Minimal assistance  Exercise Type #2: Seated lower extremity strengthening(LAQ and knee flexion)  Sets Performed: 2  Reps Performed: 10  Level of Assist: Minimal assistance         ASSESSMENT:  During LE exercises patient demonstrated increased ability to isolate specific muscle groups during exercises, however does need tactile and verbal cues to attempt to. Progression toward goals:  ?      Improving appropriately and progressing toward goals  ? Improving slowly and progressing toward goals  ? Not making progress toward goals and plan of care will be adjusted     PLAN:  Patient continues to benefit from skilled intervention to address the above impairments. Continue treatment per established plan of care. Discharge Recommendations:  Home Health  Further Equipment Recommendations for Discharge:  TBD      Estimated LOS:4 weeks    Activity Tolerance:   fair  Please refer to the flowsheet for vital signs taken during this treatment.     After treatment: Patient left sitting in w/c in the dining room      Indra Torres, PT  8/20/2019

## 2019-08-20 NOTE — PROGRESS NOTES
[facility] PHYSICAL REHABILITATION  [address] [place] [address], [address]     INPATIENT REHABILITATION  DAILY PROGRESS NOTE     Date: 8/20/2019    Name: Frederick Santiago Age / Sex: 71 y.o. / male   CSN: [id number] MRN: [id number]   [facility] Date: 8/15/2019 Length of Stay: 5 days     Primary Rehab Diagnosis: Impaired Mobility and ADLs secondary to Acute Ischemic Stroke (acute/early subacute infarct at the left posterior basal ganglia to corona radiata) with residual right hemiparesis, dysphagia and dysarthria      Subjective:     Patient seen and examined. Blood pressure remain fairly controlled. Blood glucose controlled. Team conference was held at bedside this PM.     Patient's Complaint:   No significant medical complaints    Pain Control: no current joint or muscle symptoms, essentially pain-free      Objective:     Vital Signs:  Patient Vitals for the past 24 hrs:   BP Temp Pulse Resp SpO2   08/20/19 0714 137/69 99.2 °F (37.3 °C) (!) 110 18 95 %   08/19/19 2202 169/73 97 °F (36.1 °C) 72 18 97 %   08/19/19 1338 166/75 -- 72 -- --        Physical Examination:  GENERAL SURVEY: Patient is awake, alert, oriented x 3, sitting comfortably on the chair, not in acute respiratory distress. HEENT: pink palpebral conjunctivae, anicteric sclerae, no nasoaural discharge, moist oral mucosa  NECK: supple, no jugular venous distention, no palpable lymph nodes  CHEST/LUNGS: symmetrical chest expansion, good air entry, clear breath sounds  HEART: adynamic precordium, good S1 S2, no S3, regular rhythm, no murmurs  ABDOMEN: obese, bowel sounds appreciated, soft, non-tender  EXTREMITIES: pink nailbeds, no edema except for mild right ankle swelling, full and equal pulses, no calf tenderness   NEUROLOGICAL EXAM: The patient is awake, alert and oriented x3, able to answer questions fairly appropriately, able to follow 1 and 2 step commands. Able to tell time from the wall clock.   Cranial nerves II-XII are grossly intact except for slightly shallow right nasolabial fold, dysarthria and dysphagia. No gross sensory deficit. Motor strength is 5/5 on the LUE and LLE, 0/5 on the RUE (except for 1/5 on the right shoulder), 2 to 2+/5 on RLE (except for 0/5 on the right ankle/foot).       Current Medications:  Current Facility-Administered Medications   Medication Dose Route Frequency    ferrous sulfate tablet 325 mg  1 Tab Oral DAILY WITH BREAKFAST    ascorbic acid (vitamin C) (VITAMIN C) tablet 250 mg  250 mg Oral DAILY WITH BREAKFAST    cyanocobalamin tablet 1,000 mcg  1,000 mcg Oral DAILY    cholecalciferol (VITAMIN D3) capsule 5,000 Units  5,000 Units Oral DAILY    labetalol (NORMODYNE) tablet 400 mg  400 mg Oral Q12H    isosorbide dinitrate (ISORDIL) tablet 30 mg  30 mg Oral Q8H    hydrALAZINE (APRESOLINE) tablet 25 mg  25 mg Oral TID PRN    acetaminophen (TYLENOL) tablet 650 mg  650 mg Oral Q4H PRN    bisacodyl (DULCOLAX) tablet 10 mg  10 mg Oral Q48H PRN    heparin (porcine) injection 5,000 Units  5,000 Units SubCUTAneous Q8H    insulin lispro (HUMALOG) injection   SubCUTAneous TIDAC    magnesium oxide (MAG-OX) tablet 400 mg  400 mg Oral DAILY    aspirin chewable tablet 81 mg  81 mg Oral DAILY WITH BREAKFAST    amLODIPine (NORVASC) tablet 10 mg  10 mg Oral DAILY    atorvastatin (LIPITOR) tablet 80 mg  80 mg Oral QHS    hydrALAZINE (APRESOLINE) tablet 75 mg  75 mg Oral Q8H       Allergies:  No Known Allergies      Functional Progress:    OCCUPATIONAL THERAPY    ON ADMISSION MOST RECENT   Eating  Functional Level: 5   Eating  Functional Level: 4     Grooming  Functional Level: 3   Grooming  Functional Level: 3     Bathing  Functional Level: 2   Bathing  Functional Level: 2     Upper Body Dressing  Functional Level: 4   Upper Body Dressing  Functional Level: 4     Lower Body Dressing  Functional Level: 2   Lower Body Dressing  Functional Level: 2     Toileting  Functional Level: 2   Toileting  Functional Level: 2 Toilet Transfers  Toilet Transfer Score: 1   Toilet Transfers  Toilet Transfer Score: 1     Tub /Shower Transfers  Tub/Shower Transfer Score: 0   Tub/Shower Transfers  Tub/Shower Transfer Score: 0       Legend:   7 - Independent   6 - Modified Independent   5 - Standby Assistance / Supervision / Set-up   4 - Minimum Assistance / Contact Guard Assistance   3 - Moderate Assistance   2 - Maximum Assistance   1 - Total Assistance / Dependent       Lab/Data Review:  Recent Results (from the past 24 hour(s))   GLUCOSE, POC    Collection Time: 08/19/19  5:19 PM   Result Value Ref Range    Glucose (POC) 131 (H) 70 - 110 mg/dL   GLUCOSE, POC    Collection Time: 08/19/19  8:19 PM   Result Value Ref Range    Glucose (POC) 127 (H) 70 - 110 mg/dL   GLUCOSE, POC    Collection Time: 08/20/19  8:34 AM   Result Value Ref Range    Glucose (POC) 118 (H) 70 - 110 mg/dL   GLUCOSE, POC    Collection Time: 08/20/19 12:10 PM   Result Value Ref Range    Glucose (POC) 123 (H) 70 - 110 mg/dL       Estimated Glomerular Filtration Rate:  On admission, estimated GFR based on a Creatinine of 1.20 mg/dl:              Using CKD-EPI = 63.1 mL/min/1.73m2              Using MDRD = 64.6 mL/min/1.73m2  Most recent estimated GFR, based on a Creatinine of 1.26 mg/dl on 8/19/2019:   Using CKD-EPI = 59.5 mL/min/1.73m2   Using MDRD = 61 mL/min/1.73m2       Assessment:     Primary Rehabilitation Diagnosis  1. Impaired Mobility and ADLs  2.  Acute Ischemic Stroke (acute/early subacute infarct at the left posterior basal ganglia to corona radiata) with residual right hemiparesis, dysphagia and dysarthria     Comorbidities   Obesity, Class I, BMI 30-34.9 E66.9    Obstructive sleep apnea on CPAP G47.33, Z99.89    Hypertensive heart and kidney disease without heart failure and with stage 2 chronic kidney disease I13.10, N18.2    Chronic hypokalemia E87.6    CKD (chronic kidney disease) stage 2, GFR 60-89 ml/min N18.2    Current use of aspirin Z79.82    On statin therapy due to risk of future cardiovascular event Z79.899    Type 2 diabetes mellitus with stage 2 chronic kidney disease  E11.22, N18.2    Pure hypercholesterolemia E78.00    Elevated prostate specific antigen (PSA) R97.20    Refusal of statin medication by patient Z48.34    First degree atrioventricular block by electrocardiogram I44.0    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 20X0    Leukocytosis D72.829    Iron deficiency anemia D50.9    Vitamin B12 anemia D51.9    Vitamin D deficiency E55. 9        Plan:     1. Justification for continued stay: Good progression towards established rehabilitation goals. 2. Medical Issues being followed closely:    [x]  Fall and safety precautions     []  Wound Care     [x]  Bowel and Bladder Function     [x]  Fluid Electrolyte and Nutrition Balance     []  Pain Control      3. Issues that 24 hour rehabilitation nursing is following:    [x]  Fall and safety precautions     []  Wound Care     [x]  Bowel and Bladder Function     [x]  Fluid Electrolyte and Nutrition Balance     []  Pain Control      [x]  Assistance with and education on in-room safety with transfers to and from the bed, wheelchair, toilet and shower. 4. Acute rehabilitation plan of care:    [x]  Continue current care and rehab. [x]  Physical Therapy           [x]  Occupational Therapy           [x]  Speech Therapy     []  Hold Rehab until further notice     5. Medications:    [x]  MAR Reviewed     [x]  Continue Present Medications     6. DVT Prophylaxis:      []  Lovenox     [x]  Unfractionated Heparin     []  Coumadin     []  NOAC     []  SUZAN Stockings     []  Sequential Compression Device     []  None     7.  Orders:   > Acute Ischemic Stroke (acute/early subacute infarct at the left posterior basal ganglia to corona radiata) with residual right hemiparesis, dysphagia and dysarthria; S/P Administration of intravenous tPA (8/12/2019 - [facility] [employer] [place])   > Continue:    > Aspirin 81 mg PO once daily with breakfast    > Atorvastatin 80 mg PO q HS    > Chronic hypokalemia    Serum Potassium during hospital stay at [facility]      08/15/19  0330 08/14/19  1848 08/14/19  0342 08/13/19  1205 08/12/19  2328   K 3.0* 2.8* 2.7* 2.8* 2.4*          > K (8/16/2019, on admission) = 3.8   > Mg (8/16/2019, on admission) = 1.9   > Upon admission to the ARU, patient was given Klor Con 40 meq PO BID with meals x 2 days   > K (8/17/2019) = 3.5   > K (8/18/2019) = 3.7   > K (8/19/2019) = 3.5   > Continue Mg(OH)2 400 mg PO once daily    > Hypertensive heart and kidney disease without heart failure and with stage 2 chronic kidney disease   > Upon admission to the ARU, increased Hydralazine from 50 mg to 75 mg PO q 8 hr (6AM, 2PM, 10PM)   > Once work-up for primary hyperaldosteronism is complete, plan to start Spironolactone 25 mg PO once daily   > On 8/16/2019:    > Discontinued Metoprolol succinate 50 mg PO once daily (6AM)    > Started:     > Labetalol 200 mg PO q 12 hr (9AM, 9PM)     > Isosorbide dinitrate 10 mg P) q 8 hr (6AM, 2PM, 10PM)   > On 8/18/2019, added Hydralazine 25 mg PO TID PRN for SBP greater than 170 mmHg   > On 8/19/2019:    > Increased Labetalol from 200 mg to 300 mg PO q 12 hr (9AM, 9PM)    > Increased Isosorbide dinitrate from 10 mg to 20 mg PO q 8 hr (6AM, 2PM, 10PM)   > Continue:    > Amlodipine 10 mg PO once daily (9AM)    > Hydralazine 75 mg PO q 8 hr (6AM, 2PM, 10PM)    > Increase Labetalol from 300 mg to 400 mg PO q 12 hr (9AM, 9PM)    > Increase Isosorbide dinitrate from 20 mg to 30 mg PO q 8 hr (6AM, 2PM, 10PM)    > Hydralazine 25 mg PO TID PRN for SBP greater than 170 mmHg    > Pure hypercholesterolemia   > Lipid profile (8/13/2019) showed , , HDL 42,    > Lipid profile (8/14/2019) showed , , HDL 39, LDL 94   > Continue Atorvastatin 80 mg PO q HS    > Type 2 diabetes mellitus with stage 2 chronic kidney disease   > HbA1c (4/16/2014) = 6.2   > Patient has had chronic kidney disease even before his diagnosis of Type 2 diabetes mellitus and is presumed to be due to prolonged uncontrolled hypertension   > HbA1c (8/13/2019) = 6.6   > Continue Humalog insulin sliding scale SC TID AC    >  ? Primary hyperaldosteronism as etiology of chronic hypokalemia and difficult to control hypertension   > [facility]in[facility] ([facility]. Monty Villagran[facility]transfer to the ARU   > Aldosterone/Renin activity (8/16/2019): PENDING   > Once work-up for primary hyperaldosteronism is complete, plan to start Spironolactone 25 mg PO once daily   > Endocrinology consult (Dr. Jax Benton):    > ASSESSMENT:     1. Chronic hypokalemia. 2.  Diabetes mellitus. 3.  Diabetic nephropathy with a stage II chronic renal disease. 4.  Normocytic anemia. 5.  Vitamin B12 deficiency. 6.  Borderline iron deficiency. 7.  Hypoalbuminemia.     > DISCUSSION:  The patient may well have hyperaldosteronism, but he may have it on the basis of his nephropathy, which is due to his underlying diabetes. The cause of his anemia might be a combination of B12 deficiency and iron deficiency together, so investigation should be done for this.     > PLAN: Laboratory studies:     1.  24-hour urine for aldosteronism. 2.  Methylmalonic acid level. 3.  Gastrin level. 4.  Microalbumin-to-creatinine ratio. 5.  Transferrin and prealbumin levels.     > Leukocytosis, resolved   > WBC count (8/15/2019) = 11.7   > No fever documented since admission to the ARU   > WBC count (8/16/2019, on admission) = 17.9   > Work-up:    > Urinalysis (8/16/2019): WNL    > Chest x-ray (PA-Lateral) (8/16/2019) showed a minimally blunted left posterior costophrenic angle could be due to either a tiny effusion or chronic pleural reaction/scarring; mild cardiomegaly; atherosclerosis. > WBC count (8/18/2019) = 11.6    > Iron deficiency anemia / Vitamin B12 anemia   > Anemia work-up (8/14/2019) showed serum iron 22, TIBC 371, iron % saturation 6, ferritin 34, reticulocyte count 1.4   > Vitamin B12 (8/14/2019) = 208   > Hgb/Hct (8/15/2019) = 10.1/31.3    > Hgb/Hct (8/16/2019, on admission) = 10.7/32.6   > Hgb/Hct (8/18/2019) = 8.7/27.4   > Hgb/Hct (8/19/2019) = 8.4/25.2   > On 8/19/2019, started:     > FeSO4 325 mg PO once daily with breakfast     > Ascorbic Acid 250 mg PO once daily with breakfast (to enhance the absorption of the FeSO4)     > Cyanocobalamin 1,000 mcg PO once daily    > Epoetin tone 10,000 units SC x 1 dose   > Continue:    > FeSO4 325 mg PO once daily with breakfast     > Ascorbic Acid 250 mg PO once daily with breakfast (to enhance the absorption of the FeSO4)     > Cyanocobalamin 1,000 mcg PO once daily    > Vitamin D Deficiency   > Vitamin D 25-Hydroxy (8/19/2019) = 16.2   > On 8/19/2019, patient was given Cholecalciferol 50,000 units PO x 1 dose    > Start Cholecalciferol 5,000 units PO once daily    > Diet:   > On 8/16/2019, solids consistency was advanced from Mechanical soft (NDD 2) to Soft solids (NDD 3)   > Specifications: Diabetic, low saturated fat   > Solids (consistency): Soft solids (NDD 3)   > Liquids (consistency): Mildly thick ([place]-thick)       8. Patient's progress in rehabilitation and medical issues discussed with the patient. All questions answered to the best of my ability. Care plan discussed with patient, wife and nurse.       Signed:    Kimmie Null MD    August 20, 2019

## 2019-08-20 NOTE — CONSULTS
ARU PSYCHOLOGICAL SCREENING    Assessment Initiated:  August 16, 2019    Rehab Diagnosis:  Left CVA    Pertinent Physical/Psychiatric History:     Patient Active Problem List   Diagnosis Code    Obesity, Class I, BMI 30-34.9 E66.9    Acute ischemic stroke (HCC) I63.9    Obstructive sleep apnea on CPAP G47.33, Z99.89    Impaired mobility and ADLs Z74.09    Received intravenous tissue plasminogen activator (tPA) in emergency department Z92.82    Hypertensive heart and kidney disease without heart failure and with stage 2 chronic kidney disease I13.10, N18.2    Right hemiparesis (HCC) G81.91    Dysphagia R13.10    Dysarthria R47.1    Chronic hypokalemia E87.6    CKD (chronic kidney disease) stage 2, GFR 60-89 ml/min N18.2    Current use of aspirin Z79.82    On statin therapy due to risk of future cardiovascular event Z79.899    Type 2 diabetes mellitus with stage 2 chronic kidney disease (HCC) E11.22, N18.2    Pure hypercholesterolemia E78.00    Elevated prostate specific antigen (PSA) R97.20    Refusal of statin medication by patient Z48.34    First degree atrioventricular block by electrocardiogram I44.0    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 20X0    Leukocytosis D72.829    Vitamin B12 deficiency anemia D51.9    Iron deficiency anemia D50.9    Vitamin D deficiency E55.9       Patient denies history of psychiatric services and is not requiring psychotropic medication on admit to ARU. Patient has history of remote tobacco use, sometimes consumes alcohol but no report of other substance use nor dependency. OBJECTIVE  GENERAL OBSERVATIONS  Willingness to participate in program: [x] good   [] fair [] indifferent [] poor    General Appearance:  Patient observed obese, casually and appropriately dressed and groomed and sitting upright in wheelchair, not in any distress.     Sensory Impairments:  Patient has right hemiparesis and is ataxic; patient both dysarthric and with dysphagia, requiring cues and prompts. Yazidi Affiliation:  [facility]    Admission Assessment  Discharge Status   [x] alert  [] lethargic  [] difficult to arouse  [] fluctuating  [] other: Level of Consciousness [x] alert  [] lethargic  [] difficult to arouse  [] fluctuating  [] other:   [x] person  [x] place  [x] time  [x] situation Oriented [x] person  [x] place  [x] time  [x] situation   [x] within normal limits  [] impaired       [] mild        [] moderate        [] severe Attention [x] within normal limits  [] impaired       [] mild        [] moderate        [] severe   [] within normal limits  [x] impaired       [x] mild        [] moderate        [] severe Memory [x] within normal limits  [x] impaired       [x] mild        [] moderate        [] severe   [x] appropriate to situation  [] depressed  [] anxious  [] angry   [] fearful  [] emotionally labile  [] other:  Mood [x] appropriate to situation  [] depressed  [] anxious  [] angry   [] fearful  [] emotionally labile  [] other:   [x] appropriate  [] flat  [] inappropriate to content of speech Affect [x] appropriate  [] flat  [] inappropriate to content of speech   [x] appropriate  [] aggressive/agitated  [] withdrawn  [] inappropriate  [x] other: Patient is not in acute distress but is impulsive and will require cues for maximum effort. Behavior [x] appropriate  [] aggressive/agitated  [] withdrawn  [] inappropriate  [] other:   [] good  [x] limited  [] denial  [] none Insight Into Illness [x] good  [] limited  [] denial  [] none   [x] intact  [x] impaired       [x] mild        [] moderate        [] severe       Describe: Problems evident with reasoning and problem solving and he is impulsive. Cognition [x] intact  [x] impaired       [x] mild        [] moderate        [] severe       Describe:    [x] coping  [] demonstrates poor adjustment  [] undetermined       As evidenced by: Underlying stress with illness and forced dependency is evident.  Patient Adjustment to Disability [x] coping  [] demonstrates poor adjustment  [] undetermined       As evidenced by:    [] coping  [] demonstrates poor adjustment  [x] undetermined      As evidenced by: Not available on evaluation but he feels well supported by spouse. 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 He and spouse reside in [place] in one level residence with two steps to enter; he has a daughter residing in [place], [place]. Patient has recently been employed part time for [employer] and is aware of uncertain return to employment. Patient has limited insight about stroke occurrence and recovery but insists that he is motivated to improve and hopeful for same. Patient will be monitored for emotional and/or behavioral difficulties in his recovery and encouraged to persevere. Emotionally, patient does not present in acute distress and no lability is observed. He denies history of psychiatric services and he is not requiring psychotropic medication on admit to stabilization. On the other hand, patient is obviously stressed by stroke occurrence and recovery, including demands placed on him and his forced dependency. He will certainly be monitored for emotional and/or behavioral difficulties while on ARU and encouraged to persevere. Furthermore, self concept and self esteem issues will be explored as tolerated by him. Fortunately, he does feel well supported by spouse whom he describes as in [employer]. \"    Cognitively, patient is alert and oriented. On the other hand, he is observed with difficulties in reasoning and problem solving, is impulsive, has difficulty with word retrieval and is also dysarthric, requiring some prompts.   Patient will benefit from cues, prompts, repetition, redirection and reinforcement to maximize his effort and enhance outcome in treatment program.    Patient Strengths:  Alert, oriented, pleasant, cooperative, motivated to improve, recently part time employed and, finally, feels well supported by spouse. Patient Preferences:  Patient expects to return to home. Rehab Potential:  Good but may well require supervision. Educational Needs: Under each heading list the specific items in which the patient or family will need education/training. Example: hip precautions, use of walker, ADL equipment, neglect, judgment, adjustment, etc.     Special considerations or accommodations for teaching:  [x] Yes     [] No     [] NA  If Yes, explain: Cognitive sequelae Discharge Status    Completed Demonstrated/ Verbalized Understanding    Yes No Yes No   Info regarding disability: Limited insight [x] [] [x] []   Adjustment: Forced dependency [x] [] [x] []   Cognition: Impaired problem solving and reasoning [x] [] [x] []   Other: Safety [x] [] [x] []   Other: Self concept and self esteem [x] [] [x] []   If education not completed, explain: [] [] [] []     PLAN  Problem: Limited insight about recovery  Long Term Goal: Increase insight about recovery from stroke  Intervention: Patient/stroke education  At Discharge - LTG Achieved: [x] Yes [] No If not achieved, explain:    Problem:  Forced dependency  Long Term Goal: Accept increased dependency in recovery  Intervention: Support  and patient education  At Discharge - LTG Achieved: [x] Yes [] No If not achieved, explain:    Problem: Safety  Long Term Goal: Maximize safety in recovery  Intervention: Patient education and reinforcement  At Discharge - LTG Achieved: [x] Yes [] No If not achieved, explain:    Problem: Self esteem  Long Term Goal: Maintain self confidence and self esteem  Intervention: Positive reinforcement and support   At Discharge - LTG Achieved: [x] Yes [] No If not achieved, explain:    Problem: Cognitive sequelae  Long Term Goal: Maximize problem solving and reasoning  Intervention: Patient education and support   At Discharge - LTG Achieved: [x] Yes [] No If not achieved, explain:    Nguyen Gutiérrez, THE [facility]  8/20/2019 9:58 AM    DISCHARGE STATUS    Clinical Impressions: Patient effectively participated in therapy program on ARU and persevered in his effort to regain strength and functional capabilities. He acknowledged the difficulties for himself but insisted that he was hopeful for continued recovery. He was sometimes pensive in reflecting on forced changes for both himself and his wife but seemed to accept same with equanimity. Patient understood and accepted transfer to SNF on discharge from ARU and remained motivated to continue in his recovery. Patient was thoroughly educated about stroke recovery and need to be proactive in healthcare management.     Follow-up Services Recommended Purpose                 Nguyen Gutiérrez, PHD  Discharge Date/Time:

## 2019-08-20 NOTE — ROUTINE PROCESS
0800 Pt. Awake sitting up in bed no change in assessment no signs of distress pt. Stated to be feeling fine. 0930 Pt. Sitting up in chair eating breakfast.  1200 pt. With therapy. 1330 able to transfer by wheelchair. 1500 no change in assessment. 1800 Pt. Sitting up in chair eating dinner. Family in room.  24 hr urine continued til 0900 am.

## 2019-08-20 NOTE — ROUTINE PROCESS
SHIFT CHANGE NOTE FOR [facility]    Bedside and Verbal shift change report given to [employer] (oncoming nurse) by Sherlyn Robbins RN   (offgoing nurse). Report included the following information SBAR, Kardex, MAR and Recent Results.     Situation:   Code Status: Full Code   Reason for Admission: CVA  Hospital Day: 5   Problem List:   Hospital Problems  Date Reviewed: 8/19/2019          Codes Class Noted POA    Vitamin D deficiency (Chronic) ICD-10-CM: E55.9  ICD-9-CM: 268.9  8/19/2019 Yes    Overview Signed 8/19/2019  1:34 PM by Samara Martin MD     Vitamin D 25-Hydroxy (8/19/2019) = 16.2              Leukocytosis ICD-10-CM: D28.664  ICD-9-CM: 288.60  8/16/2019 Yes    Overview Signed 8/16/2019 11:19 AM by Samara Martin MD     WBC count (8/16/2019) = 17.9             Hypertensive heart and kidney disease without heart failure and with stage 2 chronic kidney disease (Chronic) ICD-10-CM: I13.10, N18.2  ICD-9-CM: 404.90, 585.2  Unknown Yes        Chronic hypokalemia ICD-10-CM: E87.6  ICD-9-CM: 276.8  Unknown Yes    Overview Addendum 8/15/2019 11:31 AM by Samraa Martin MD     Persistent chronic hypokalemia + hypertension; ?primary hyperaldosteronism             Type 2 diabetes mellitus with stage 2 chronic kidney disease (HCC) (Chronic) ICD-10-CM: E11.22, N18.2  ICD-9-CM: 250.40, 585.2  Unknown Yes    Overview Signed 8/15/2019 12:26 PM by Samara Martin MD     HbA1c (8/13/2019) = 6.6             Pure hypercholesterolemia (Chronic) ICD-10-CM: E78.00  ICD-9-CM: 272.0  8/15/2019 Yes    Overview Addendum 8/15/2019  6:27 PM by Samara Martin MD     Lipid profile (8/13/2019) showed , , HDL 42, ; Lipid profile (8/14/2019) showed , , HDL 39, LDL 94             Current use of aspirin ICD-10-CM: Z79.82  ICD-9-CM: V58.66  8/14/2019 Yes        On statin therapy due to risk of future cardiovascular event ICD-10-CM: Z79.899  ICD-9-CM: V58.69  8/14/2019 Yes    Overview Signed 8/15/2019 12:21 PM by Lianet James MD     On Atorvastatin             Vitamin B12 deficiency anemia (Chronic) ICD-10-CM: D51.9  ICD-9-CM: 281.1  8/14/2019 Yes    Overview Signed 8/19/2019  1:24 PM by Lianet James MD     Vitamin B12 (8/14/2019) = 208             Iron deficiency anemia (Chronic) ICD-10-CM: D50.9  ICD-9-CM: 280.9  8/14/2019 Yes    Overview Signed 8/19/2019  1:25 PM by Lianet James MD     Anemia work-up (8/14/2019) showed serum iron 22, TIBC 371, iron % saturation 6, ferritin 34, reticulocyte count 1.4               Refusal of statin medication by patient ICD-10-CM: Z53.29  ICD-9-CM: V64.2  8/13/2019 Yes    Overview Signed 8/15/2019  2:01 PM by Lianet James MD     As per note of Neurology (Dr. Norman Bautista), patient refuses statin agent because of potential side effects.               * (Principal) Acute ischemic stroke ([facility] ICD-10-CM: I63.9  ICD-9-CM: 434.91  8/12/2019 Yes    Overview Signed 8/15/2019 12:17 AM by Lianet James MD     Acute Ischemic Stroke (acute/early subacute infarct at the left posterior basal ganglia to corona radiata) with residual right hemiparesis, dysphagia and dysarthria             Impaired mobility and ADLs ICD-10-CM: Z74.09  ICD-9-CM: 799.89  8/12/2019 Yes        Received intravenous tissue plasminogen activator (tPA) in emergency department ICD-10-CM: Z92.82  ICD-9-CM: V45.88  8/12/2019 Yes        [facility] ([facility] ICD-10-CM: G81.91  ICD-9-CM: 342.90  8/12/2019 Yes        Dysphagia ICD-10-CM: R13.10  ICD-9-CM: 787.20  8/12/2019 Yes        Dysarthria ICD-10-CM: R47.1  ICD-9-CM: 784.51  8/12/2019 Yes              Background:   Past Medical History:   Past Medical History:   Diagnosis Date    Acute ischemic stroke ([address] 8/12/2019    Acute Ischemic Stroke (acute/early subacute infarct at the left posterior basal ganglia to corona radiata) with residual right hemiparesis, dysphagia and dysarthria    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     On Allopurinol    Chronic hypokalemia     Persistent chronic hypokalemia + hypertension; ?primary hyperaldosteronism    CKD (chronic kidney disease) stage 2, GFR 60-89 ml/min 8/15/2019    Current use of aspirin 8/14/2019    Dysarthria 8/12/2019    Dysphagia 8/12/2019    Elevated prostate specific antigen (PSA) 7/21/2011    First degree atrioventricular block by electrocardiogram 8/12/2019    Hypertensive heart and kidney disease without heart failure and with stage 2 chronic kidney disease     Iron deficiency anemia 8/14/2019    Anemia work-up (8/14/2019) showed serum iron 22, TIBC 371, iron % saturation 6, ferritin 34, reticulocyte count 1.4     Obesity, Class I, BMI 30-34.9     Obstructive sleep apnea on CPAP     On statin therapy due to risk of future cardiovascular event 8/14/2019    On Atorvastatin    Pure hypercholesterolemia 08/15/2019    Lipid profile (8/13/2019) showed , , HDL 42, ; Lipid profile (8/14/2019) showed , , HDL 39, LDL 94    Received intravenous tissue plasminogen activator (tPA) in emergency department 8/12/2019    Refusal of statin medication by patient 8/13/2019    As per note of Neurology (Dr. Jessica Kaminski), patient refuses statin agent because of potential side effects.  Right hemiparesis ([address] 8/12/2019    Type 2 diabetes mellitus with stage 2 chronic kidney disease (HCC)     HbA1c (8/13/2019) = 6.6    Vitamin B12 deficiency anemia 8/14/2019    Vitamin B12 (8/14/2019) = 208    Vitamin D deficiency 8/19/2019    Vitamin D 25-Hydroxy (8/19/2019) = 16.2       Patient taking anticoagulants yes Heparin, ASA   Patient has a defibrillator: no     Assessment:   Changes in Assessment throughout shift: No     Patient has central line: no Reasons if yes: Insertion date: Last dressing date:   Patient has Boykin Cath: no Reasons if yes:     Insertion date:     Last Vitals:     Vitals:    08/19/19 0608 08/19/19 0737 08/19/19 1338 08/19/19 2202   BP: (!) 194/91 170/72 166/75 169/73   Pulse: 75 70 72 72   Resp:  16  18   Temp:  97.3 °F (36.3 °C)  97 °F (36.1 °C)   SpO2:  96%  97%   Weight:       Height:            PAIN    Pain Assessment    Pain Intensity 1: 0 (08/20/19 0400) Pain Intensity 1: 2 (12/29/14 1105)    Pain Location 1: Shoulder Pain Location 1: Abdomen    Pain Intervention(s) 1: Medication (see MAR) Pain Intervention(s) 1: Medication (see MAR)  Patient Stated Pain Goal: 0 Patient Stated Pain Goal: 0  o Intervention effective: no    o Other actions taken for pain:      Skin Assessment  Skin color Skin Color: Appropriate for ethnicity  Condition/Temperature Skin Condition/Temp: Dry, Warm  Integrity Skin Integrity: Tear  Turgor Turgor: Non-tenting  Weekly Pressure Ulcer Documentation  Pressure  Injury Documentation: No Pressure Injury Noted-Pressure Ulcer Prevention Initiated  Wound Prevention & Protection Methods  Orientation of wound Orientation of Wound Prevention: Posterior  Location of Prevention Location of Wound Prevention: Buttocks, Sacrum/Coccyx  Dressing Present Dressing Present : Yes  Dressing Status Dressing Status: Intact  Wound Offloading Wound Offloading (Prevention Methods): Bed, pressure redistribution/air     INTAKE/OUPUT  Date 08/19/19 0700 - 08/20/19 0659 08/20/19 0700 - 08/21/19 0659   Shift 5979-9380 8716-6896 24 Hour Total 9983-3310 2772-4133 24 Hour Total   INTAKE   P.O. 1200  1200        P. O. 1200  1200      Shift Total(mL/kg) 1200(11.8)  1200(11.8)      OUTPUT   Urine(mL/kg/hr) 150(0.1)  150        Urine Voided 150  150        Urine Occurrence(s) 5 x  5 x      Emesis/NG output           Emesis Occurrence(s) 0 x  0 x      Stool           Stool Occurrence(s) 1 x  1 x      Shift Total(mL/kg) 150(1.5)  150(1.5)      NET 1050  1050      Weight (kg) 102 102 102 102 102 102       Recommendations:  1. Patient needs and requests:     2. Diet: Cardiac Mech Soft Nectar Thick     3. Pending tests/procedures:      4. Functional Level/Equipment:     5.  Estimated Discharge Date: 8/24/19 Posted on Whiteboard in Patients Room: yes     HEALS Safety Check    A safety check occurred in the patient's room between off going nurse and oncoming nurse listed above. The safety check included the below items  Area Items   H  High Alert Medications - Verify all high alert medication drips (heparin, PCA, etc.)   E  Equipment - Suction is set up for ALL patients (with diann)  - Red plugs utilized for all equipment (IV pumps, etc.)  - WOWs wiped down at end of shift.  - Room stocked with oxygen, suction, and other unit-specific supplies   A  Alarms - Bed alarm is set for fall risk patients  - Ensure chair alarm is in place and activated if patient is up in a chair   L  Lines - Check IV for any infiltration  - Boykin bag is empty if patient has a Boykin   - Tubing and IV bags are labeled   S  Safety   - Room is clean, patient is clean, and equipment is clean. - Hallways are clear from equipment besides carts. - Fall bracelet on for fall risk patients  - Ensure room is clear and free of clutter  - Suction is set up for ALL patients (with diann)  - Hallways are clear from equipment besides carts.    - Isolation precautions followed, supplies available outside room, sign posted

## 2019-08-20 NOTE — PROGRESS NOTES
[x] Psychology  [] Social Work [] Recreational Therapy    INTERVENTION  UNITS/TIME OF SERVICE   Assessment August 16, 2019   Supportive Counseling    Orientation    Discharge Planning    Resource Linkage              Progress/Current Status    Patient was seen for Psychological Evaluation on above referenced date as requested on admission to ARU by Dr. Kaz Ashford. Patient found sitting upright in wheelchair in room and not in any distress. He is found to have some difficulties with both language and cognition, especially having difficulty with word retrieval and sometimes intelligibility. However, he is very pleasant and cooperative, socially engaging and obviously motivated to improve and hopeful for recovery. Patient does not report any history of psychiatric services and denies that he is currently in distress; however, he is obviously stressed by circumstances and sudden demands placed on him in recovery. Patient will be monitored for any emotional and/or behavioral difficulties while in recovery on ARU, encouraged to persevere; and, he will be further educated about all aspects of stroke occurrence and recovery, emphasizing risk factors for recurrence and health maintenance for long term stability.     Melba Langley, PHD 8/20/2019 9:55 AM

## 2019-08-20 NOTE — ROUTINE PROCESS
SHIFT CHANGE NOTE FOR [facility]    Bedside and Verbal shift change report given to Terra Manley, MAURO (oncoming nurse) by Roberta Zapata RN   (offgoing nurse). Report included the following information SBAR, Kardex, MAR and Recent Results.     Situation:   Code Status: Full Code   Reason for Admission: CVA  Hospital Day: 5   Problem List:   Hospital Problems  Date Reviewed: 8/20/2019          Codes Class Noted POA    Vitamin D deficiency (Chronic) ICD-10-CM: E55.9  ICD-9-CM: 268.9  8/19/2019 Yes    Overview Signed 8/19/2019  1:34 PM by Fabrice Shaffer MD     Vitamin D 25-Hydroxy (8/19/2019) = 16.2              Leukocytosis ICD-10-CM: J50.073  ICD-9-CM: 288.60  8/16/2019 Yes    Overview Signed 8/16/2019 11:19 AM by Fabrice Shaffer MD     WBC count (8/16/2019) = 17.9             Hypertensive heart and kidney disease without heart failure and with stage 2 chronic kidney disease (Chronic) ICD-10-CM: I13.10, N18.2  ICD-9-CM: 404.90, 585.2  Unknown Yes        Chronic hypokalemia ICD-10-CM: E87.6  ICD-9-CM: 276.8  Unknown Yes    Overview Addendum 8/15/2019 11:31 AM by Fabrice Shaffer MD     Persistent chronic hypokalemia + hypertension; ?primary hyperaldosteronism             Type 2 diabetes mellitus with stage 2 chronic kidney disease (HCC) (Chronic) ICD-10-CM: E11.22, N18.2  ICD-9-CM: 250.40, 585.2  Unknown Yes    Overview Signed 8/15/2019 12:26 PM by Fabrice Shaffer MD     HbA1c (8/13/2019) = 6.6             Pure hypercholesterolemia (Chronic) ICD-10-CM: E78.00  ICD-9-CM: 272.0  8/15/2019 Yes    Overview Addendum 8/15/2019  6:27 PM by Fabrice Shaffer MD     Lipid profile (8/13/2019) showed , , HDL 42, ; Lipid profile (8/14/2019) showed , , HDL 39, LDL 94             Current use of aspirin ICD-10-CM: Z79.82  ICD-9-CM: V58.66  8/14/2019 Yes        On statin therapy due to risk of future cardiovascular event ICD-10-CM: Z79.899  ICD-9-CM: V58.69  8/14/2019 Yes    Overview Signed 8/15/2019 12:21 PM by Samara Martin MD     On Atorvastatin             Vitamin B12 deficiency anemia (Chronic) ICD-10-CM: D51.9  ICD-9-CM: 281.1  8/14/2019 Yes    Overview Signed 8/19/2019  1:24 PM by Samara Martin MD     Vitamin B12 (8/14/2019) = 208             Iron deficiency anemia (Chronic) ICD-10-CM: D50.9  ICD-9-CM: 280.9  8/14/2019 Yes    Overview Signed 8/19/2019  1:25 PM by Samara Martin MD     Anemia work-up (8/14/2019) showed serum iron 22, TIBC 371, iron % saturation 6, ferritin 34, reticulocyte count 1.4               Refusal of statin medication by patient ICD-10-CM: Z53.29  ICD-9-CM: V64.2  8/13/2019 Yes    Overview Signed 8/15/2019  2:01 PM by Samara Martin MD     As per note of Neurology (Dr. Germain Villanueva), patient refuses statin agent because of potential side effects.               * (Principal) Acute ischemic stroke ([facility] ICD-10-CM: I63.9  ICD-9-CM: 434.91  8/12/2019 Yes    Overview Signed 8/15/2019 12:17 AM by Samara Martin MD     Acute Ischemic Stroke (acute/early subacute infarct at the left posterior basal ganglia to corona radiata) with residual right hemiparesis, dysphagia and dysarthria             Impaired mobility and ADLs ICD-10-CM: Z74.09  ICD-9-CM: 799.89  8/12/2019 Yes        Received intravenous tissue plasminogen activator (tPA) in emergency department ICD-10-CM: Z92.82  ICD-9-CM: V45.88  8/12/2019 Yes        [facility] ([facility] ICD-10-CM: G81.91  ICD-9-CM: 342.90  8/12/2019 Yes        Dysphagia ICD-10-CM: R13.10  ICD-9-CM: 787.20  8/12/2019 Yes        Dysarthria ICD-10-CM: R47.1  ICD-9-CM: 784.51  8/12/2019 Yes              Background:   Past Medical History:   Past Medical History:   Diagnosis Date    [facility] stroke ([facility] 8/12/2019    Acute Ischemic Stroke (acute/early subacute infarct at the left posterior basal ganglia to corona radiata) with residual right hemiparesis, dysphagia and dysarthria    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     On Allopurinol    Chronic hypokalemia     Persistent chronic hypokalemia + hypertension; ?primary hyperaldosteronism    CKD (chronic kidney disease) stage 2, GFR 60-89 ml/min 8/15/2019    Current use of aspirin 8/14/2019    Dysarthria 8/12/2019    Dysphagia 8/12/2019    Elevated prostate specific antigen (PSA) 7/21/2011    First degree atrioventricular block by electrocardiogram 8/12/2019    Hypertensive heart and kidney disease without heart failure and with stage 2 chronic kidney disease     Iron deficiency anemia 8/14/2019    Anemia work-up (8/14/2019) showed serum iron 22, TIBC 371, iron % saturation 6, ferritin 34, reticulocyte count 1.4     Obesity, Class I, BMI 30-34.9     Obstructive sleep apnea on CPAP     On statin therapy due to risk of future cardiovascular event 8/14/2019    On Atorvastatin    Pure hypercholesterolemia 08/15/2019    Lipid profile (8/13/2019) showed , , HDL 42, ; Lipid profile (8/14/2019) showed , , HDL 39, LDL 94    Received intravenous tissue plasminogen activator (tPA) in emergency department 8/12/2019    Refusal of statin medication by patient 8/13/2019    As per note of Neurology (Dr. Mimi Silva), patient refuses statin agent because of potential side effects.  Right hemiparesis ([address] 8/12/2019    Type 2 diabetes mellitus with stage 2 chronic kidney disease (HCC)     HbA1c (8/13/2019) = 6.6    Vitamin B12 deficiency anemia 8/14/2019    Vitamin B12 (8/14/2019) = 208    Vitamin D deficiency 8/19/2019    Vitamin D 25-Hydroxy (8/19/2019) = 16.2       Patient taking anticoagulants yes Heparin, ASA   Patient has a defibrillator: no     Assessment:   Changes in Assessment throughout shift: No     Patient has central line: no Reasons if yes: Insertion date: Last dressing date:   Patient has Boykin Cath: no Reasons if yes:     Insertion date:     Last Vitals:     Vitals:    08/19/19 2202 08/20/19 0714 08/20/19 1352 08/20/19 1600   BP: 169/73 137/69 144/73 139/76   Pulse: 72 (!) 110 75 72   Resp: 18 18  18   Temp: 97 °F (36.1 °C) 99.2 °F (37.3 °C)  97.1 °F (36.2 °C)   SpO2: 97% 95%  97%   Weight:       Height:            PAIN    Pain Assessment    Pain Intensity 1: 0 (08/20/19 1600) Pain Intensity 1: 2 (12/29/14 1105)    Pain Location 1: Shoulder Pain Location 1: Abdomen    Pain Intervention(s) 1: Medication (see MAR) Pain Intervention(s) 1: Medication (see MAR)  Patient Stated Pain Goal: 0 Patient Stated Pain Goal: 0  o Intervention effective: no    o Other actions taken for pain:      Skin Assessment  Skin color Skin Color: Appropriate for ethnicity  Condition/Temperature Skin Condition/Temp: Dry, Warm  Integrity Skin Integrity: Tear  Turgor Turgor: Non-tenting  Weekly Pressure Ulcer Documentation  Pressure  Injury Documentation: No Pressure Injury Noted-Pressure Ulcer Prevention Initiated  Wound Prevention & Protection Methods  Orientation of wound Orientation of Wound Prevention: Posterior  Location of Prevention Location of Wound Prevention: Buttocks, Sacrum/Coccyx  Dressing Present Dressing Present : Yes  Dressing Status Dressing Status: Intact  Wound Offloading Wound Offloading (Prevention Methods): Bed, pressure redistribution/air     INTAKE/OUPUT  Date 08/19/19 0700 - 08/20/19 0659 08/20/19 0700 - 08/21/19 0659   Shift 7393-2599 6981-9876 24 Hour Total 6159-1278 7756-8996 24 Hour Total   INTAKE   P.O. 1200  1200 1620  1620     P. O. 1200  1200 1620  1620   Shift Total(mL/kg) 1200(11.8)  1200(11.8) 1620(15.9)  1620(15.9)   OUTPUT   Urine(mL/kg/hr) 150(0.1)  150(0.1) 150  150     Urine Voided 150  150 150  150     Urine Occurrence(s) 5 x 3 x 8 x 8 x  8 x   Emesis/NG output           Emesis Occurrence(s) 0 x  0 x 0 x  0 x   Stool           Stool Occurrence(s) 1 x 0 x 1 x 2 x  2 x   Shift Total(mL/kg) 150(1.5)  150(1.5) 150(1.5)  150(1.5)   NET 1050  1050 1470  1470   Weight (kg) 102 102 102 102 102 102       Recommendations:  1.  Patient needs and requests:     2. Diet: Cardiac [facility] Soft Nectar Thick     3. Pending tests/procedures:      4. Functional Level/Equipment:     5. Estimated Discharge Date: 8/24/19 Posted on Whiteboard in Patients Room: yes     HEALS Safety Check    A safety check occurred in the patient's room between off going nurse and oncoming nurse listed above. The safety check included the below items  Area Items   H  High Alert Medications - Verify all high alert medication drips (heparin, PCA, etc.)   E  Equipment - Suction is set up for ALL patients (with diann)  - Red plugs utilized for all equipment (IV pumps, etc.)  - WOWs wiped down at end of shift.  - Room stocked with oxygen, suction, and other unit-specific supplies   A  Alarms - Bed alarm is set for fall risk patients  - Ensure chair alarm is in place and activated if patient is up in a chair   L  Lines - Check IV for any infiltration  - Boykin bag is empty if patient has a Boykin   - Tubing and IV bags are labeled   S  Safety   - Room is clean, patient is clean, and equipment is clean. - Hallways are clear from equipment besides carts. - Fall bracelet on for fall risk patients  - Ensure room is clear and free of clutter  - Suction is set up for ALL patients (with diann)  - Hallways are clear from equipment besides carts.    - Isolation precautions followed, supplies available outside room, sign posted

## 2019-08-20 NOTE — PROGRESS NOTES
The pt is awake, alert. VS stable. Rainer level is normal at 8.8. Renin level is pending. 2 hour urine for aldosterone is being recollected. Vitamin D level is low at 16. Will start vitamin D supplementation, and will await the other results before starting treatment for hyperaldosteronism.

## 2019-08-20 NOTE — PROGRESS NOTES
Problem: Dysphagia (Adult)  Goal: *Speech Goal: (INSERT TEXT)  Description  Long term goals  Patient will:  1. Tolerate regular diet with thin liquids using safe swallowing techniques without s/s of aspiration in 5/6 meals. 2. Use safe swallowing techniques (including slowed rate, small bites/sips, alternate liquids/solids, effortful swallow, head rotation to right for liquids) with supervision. 3. Participate in MBS study prior to advancing to thin liquids to assure safety (no aspiration). 4. Perform oral and pharyngeal strengthening exercises (to improve speech and swallowing) with supervision-modified independence. 5. Demonstrate 90% intelligibility in conversation using appropriate speaking strategies (slowed rate, overarticulation, increased volume). 6. Recall 3 words after 5 minutes with supervision. 7. Perform functional problem solving/reasoning tasks with 90% accuracy. 8. Name 8-10 items in categories of increasing abstraction, supervision. Short term goals (by 8/23/19)  Patient will:   1. Tolerate soft diet with nectar thick liquids using safe swallowing techniques without s/s of aspiration in 5/6 meals. 2. Use safe swallowing techniques (including slowed rate, small bites/sips, alternate liquids/solids, effortful swallow, head rotation to right for liquids) with min cues. 3. Tolerate small amounts of ice chips with the SLP using head rotation to the right and/or chin tuck, without overt s/s of aspiration. 4. Perform oral and pharyngeal strengthening exercises (to improve speech and swallowing) with mod-min assist.  5. Demonstrate 90% intelligibility in single words of up to 4 syllables using appropriate speaking strategies (slowed rate, overarticulation, increased volume). 6. Recall 3 words after 5 minutes with mod assist.  7. Perform functional problem solving/reasoning tasks with 75-85% accuracy. 8. Name 8-10 items in concrete categories, supervision.      Note:   SPEECH LANGUAGE PATHOLOGY TREATMENT    Patient: [facility] ([place] (62 y.o. male)  Date: 8/20/2019  Diagnosis: Acute ischemic stroke [facility]) [I63.9] Acute ischemic stroke ([address]       Time in: 1130 1230  Time Out:  1200 1300  SUBJECTIVE:   Patient stated [facility] can't I have what I ordered. OBJECTIVE:   Mental Status:  Mr. Lola Sifuentes was alert and cooperative with the SLP. However, at lunchtime he was very upset that he was not able to have an item listed on the \"Available Anytime\" menu and he was not asked about or happy with the items sent. Treatment & Interventions:   Patient was seen for a thirty minute speech therapy session this morning. His wife was present during the session and was very supportive. The following treatment tasks were presented: Motor Speech:   Oral exercises: Reviewed x 3-5 each  Review of strategies: Mod assist  3 syllable words: 80% accurate/intelligible  Sentences:  80% accurate/intelligible    Small boluses ice: Presented by the SLP on spoon. Patient needed mod cues to use head rotation to the right for swallow (wife seated to his right). Tolerated without overt s/s of aspiration. Small sips water: Patient took from side of the cup. He still needed mod cues for use of head rotation. After 3 swallows, Mr. Lola Sifuentes demonstrated throat clearing and cough. Neuro-Linguistics:   Orientation:  Independent  Recent memory: Independent    In the dining room with his lunch, a lengthy discussion was held with the patient, his wife and the SLP re: new diet restrictions not related to the ARU. He was quite upset about what he was not given. His wife persuaded him to eat what he could. Mr. Lola Sifuentes needed mod-max cues for use of safe swallowing techniques, in part due to his poor mood. He did demonstrate cough when eating pineapple tidbits which had not been drained of the accompanying juice in the kitchen. Response & Tolerance to Activities:  Mr. Lola Sifuentes was cooperative and pleasant in earlier session. However, at lunchtime, he was quite displeased with what was offered on his tray and had a difficult time moving past this emotion. He continues to need at least moderate cues for safe swallowing as he is impulsive, eating very quickly with large boluses. Pain:  Pain Scale 1: Numeric (0 - 10)  No report of pain     After treatment:   ?       Patient left in no apparent distress sitting up in chair  ? Patient left in no apparent distress in bed  ? Call bell left within reach  ? Nursing notified  ? Caregiver present  ? Bed alarm activated    ASSESSMENT:   Progression toward goals:  ?       Improving appropriately and progressing toward goals  ? Improving slowly and progressing toward goals  ? Not making progress toward goals and plan of care will be adjusted    PLAN:   Patient continues to benefit from skilled intervention to address the above impairments. Continue treatment per established plan of care.   Discharge Recommendations:  Home Health    Estimated LOS: Through 9/12/19    Lopez Serrato SLP  Time calculation:  61 Minutes

## 2019-08-20 NOTE — PROGRESS NOTES
Problem: Self Care Deficits Care Plan (Adult)  Goal: *Therapy Goal (Edit Goal, Insert Text)  Description  Occupational Therapy Goals   Long Term Goals  Initiated 2019 and to be accomplished within 4 week(s)  1. Pt will perform self-feeding with MI.  2. Pt will perform grooming with Set-up. 3. Pt will perform UB bathing with SBA. 4. Pt will perform LB bathing with Luba/CGA. 5. Pt will perform tub/shower transfer with Luba/CGA using LRAD. 6. Pt will perform UB dressing with Set-up. 7. Pt will perform LB dressing with Luba/CGA. 8. Pt will perform toileting task with Luba/CGA. 9. Pt will perform toilet transfer with uLba/CGA using LRAD. Short Term Goals   Initiated 2019 and to be accomplished within 7 day(s) (2019)  1. Pt will perform self-feeding with S; using compensatory strategies for right hand incorporation into set-up. 2. Pt will perform simple grooming task, including denture mgmt, with Luba and compensatory strategies as needed. 3. Pt will perform UB bathing with Luba using AE as needed. 4. Pt will perform LB bathing with ModA using AE and compensatory strategies as needed. 5. Pt will perform tub/shower transfer with modA x 1 <>TTB. 6. Pt will perform UB dressing with CGA. 7. Pt will perform LB dressing with ModA using AE as needed. 8. Pt will perform toileting task with ModA. 9. Pt will perform toilet transfer with ModA x 1 <>3:1 commode. Outcome: Progressing Towards Goal   OCCUPATIONAL THERAPY TREATMENT    Patient: Tasha Scanlon   71 y.o. Patient identified with name and : yes    Date: 2019    First Tx Session  Time In: 56  Time Out[de-identified] 80    Second Tx Session  Time In: 1330  Time Out[de-identified] 1400    Diagnosis: Acute ischemic stroke [facility]) [I63.9]   Precautions: Aspiration, Fall   Chart, occupational therapy assessment, plan of care, and goals were reviewed. Pain:  Pt reports no c/o pain or discomfort prior to treatment.     Pt reports no c/o pain or discomfort post treatment. Intervention Provided: NA      SUBJECTIVE:   Patient stated I got a cramp in my [right] side last night.     Pt appearing very concerned during OT/spouse discussion regarding home set-up (whether w/c will fit into bathroom) and DME recommendations. Pt stated 2x \"is this going to keep me from going home? \" Pt educated on staff goal to have pt return home, but also needing pt to be as safe as possible with pt demo'ing fair understanding. Pt is very motivated to return home as soon as possible, at times pushing self past capacity, requiring cues to take rest breaks and slow movements to increase safety and right UE/LE awareness. OBJECTIVE DATA SUMMARY:   Spouse present for 2/2 sessions. Spouse educated on current OT DME recommendations (bariatric 3:1 and TTB) for understanding and beginning preparation for pt's d/c. OT providing spouse with handouts and visual demo of TTB purpose and use. Spouse reporting wheelchair will not fit in their bathroom, and unsure if w/c will fit in carpeted hallway. Spouse educated on measuring doorframes and hallway width at earliest convenience for therapy team. OT noting during 2nd session, pt's left ankle at lateral malleolus appearing swollen. Nursing and PT notified as well as pt's LE placed in elevated leg rest position to encourage decreased edema. Continue to monitor. THERAPEUTIC ACTIVITY Daily Assessment   1st session:  Pt performed AAROM with scapular mobilization for elevation/depression, abd/adduction x 10 repetitions in prep for NMRE tasks and PROM/AAROM for right UE . Performed PROM in all planes to right UE to facilitate continued good jt mobility and decrease risk of contracture. NMRE Daily Assessment   1st session:   Pt performed standing task to facilitate proprioceptive weight bearing through the right UE.  Pt instructed to stack cones in various planes (forward/back, diagonal:diagonal) with left UE, while right was placed on table with HOHA stabilization from therapist. Pt performed x 4 repetitions with demo'ing tricep extension initiation with continued cue from therapist.     Sitting in w/c, pt perform AAROM via arm skate for right UE. Pt performed elbow flexion/shldr adduction to flex elbow from 0-90 degrees 3 x 10. Pt demo'ed proximal shldr/bicep motor initiation and movement. Performed elbow extension via skate with pt demo'ing ~30 degrees of extension, demo'ing trace tricep initiation, and using compensation using trunk to move extremity 3 x 10 repetitions. Performed shldr flexion to move skate anterior on table to reach target with pt demo'ing anterior deltoid activation 3 x 10 repetitions. LOWER BODY DRESSING Daily Assessment   2nd session:  Lower Body Dressing   Dressing Assistance : 4 (Minimal assistance)  Leg Crossed Method Used: No  Position Performed: Seated in chair  Adaptive Equipment Used: Sock aid(Bariatric karthik sock aide)  Comments: Pt educated on use of bariatric karthik sock aide for increasing (I) in LB dressing. Pt performed x 3 trials (2x left LE, 1x right LE), requiring 2-3 trials to cecily sock on equipment and requiring tactile (A) for right LE 2/2 to foot persperation and decrease LE motor coordination/activation. MOBILITY/TRANSFERS Daily Assessment   2nd session:  Functional Transfers  Toilet Transfer : Stand pivot transfer without device  Amount of Assistance Required: 3 (Moderate assistance)  Adaptive Equipment: Bedside commode;Grab bars; Wheelchair(Bariatric BSC)     Performed toilet transfer training with pt performing w/c<>3:1 in bathroom. Pt utilizing grab bars for left UE stabilization with pt educated on not having grab bar (A) in home environment. Pt would benefit from continued transfer training (sit<>stand, step training) to decrease need/use for grab bar (A) for smooth transition to home environment.         ASSESSMENT:  Pt to continue with AE training to increase (I) in LB dressing with continued practice with sock aide and education on use of reacher and shoe horn. Pt would also benefit from elastic shoe laces to increase (I) in shoe mgmt. Pt continuing to progress with proximal UE motor activation (flexors greater than extensors) via AAROM with skate. Progression toward goals:  ?          Improving appropriately and progressing toward goals  ? Improving slowly and progressing toward goals  ? Not making progress toward goals and plan of care will be adjusted     PLAN:  Patient continues to benefit from skilled intervention to address the above impairments. Continue treatment per established plan of care. Discharge Recommendations:  [address] [place] Pending progress   Further Equipment Recommendations for Discharge:  Bariatric 3:1, TTB     Activity Tolerance:  Good       Estimated LOS: 9/12/19    Please refer to the flowsheet for vital signs taken during this treatment. After treatment:   ?  Patient left in no apparent distress sitting up in chair   ? Patient left in no apparent distress in bed  ? Call bell left within reach  ? Nursing notified  ? Caregiver present  ? Chair alarm activated    COMMUNICATION/EDUCATION:   ? Home safety education was provided and the patient/caregiver indicated understanding. ? Patient/family have participated as able in goal setting and plan of care. ? Patient/family agree to work toward stated goals and plan of care. ? Patient understands intent and goals of therapy, but is neutral about his/her participation. ? Patient is unable to participate in goal setting and plan of care.       Amee Webb, OT

## 2019-08-20 NOTE — ROUTINE PROCESS
[facility]    Bedside and Verbal shift change report given to Nghia Dockery, RN (oncoming nurse) by Shantell Hayes RN   (offgoing nurse). Report included the following information SBAR, Kardex, MAR and Recent Results.     Situation:   Code Status: Full Code   Reason for Admission: CVA  Hospital Day: 4   Problem List:   Hospital Problems  Date Reviewed: 8/19/2019          Codes Class Noted POA    Vitamin D deficiency (Chronic) ICD-10-CM: E55.9  ICD-9-CM: 268.9  8/19/2019 Yes    Overview Signed 8/19/2019  1:34 PM by Carlos Gamez MD     Vitamin D 25-Hydroxy (8/19/2019) = 16.2              Leukocytosis ICD-10-CM: X38.318  ICD-9-CM: 288.60  8/16/2019 Yes    Overview Signed 8/16/2019 11:19 AM by Carlos Gamez MD     WBC count (8/16/2019) = 17.9             Hypertensive heart and kidney disease without heart failure and with stage 2 chronic kidney disease (Chronic) ICD-10-CM: I13.10, N18.2  ICD-9-CM: 404.90, 585.2  Unknown Yes        Chronic hypokalemia ICD-10-CM: E87.6  ICD-9-CM: 276.8  Unknown Yes    Overview Addendum 8/15/2019 11:31 AM by Carlos Gamez MD     Persistent chronic hypokalemia + hypertension; ?primary hyperaldosteronism             Type 2 diabetes mellitus with stage 2 chronic kidney disease (HCC) (Chronic) ICD-10-CM: E11.22, N18.2  ICD-9-CM: 250.40, 585.2  Unknown Yes    Overview Signed 8/15/2019 12:26 PM by Carlos Gamez MD     HbA1c (8/13/2019) = 6.6             Pure hypercholesterolemia (Chronic) ICD-10-CM: E78.00  ICD-9-CM: 272.0  8/15/2019 Yes    Overview Addendum 8/15/2019  6:27 PM by Carlos Gamez MD     Lipid profile (8/13/2019) showed , , HDL 42, ; Lipid profile (8/14/2019) showed , , HDL 39, LDL 94             Current use of aspirin ICD-10-CM: Z79.82  ICD-9-CM: V58.66  8/14/2019 Yes        On statin therapy due to risk of future cardiovascular event ICD-10-CM: Z79.899  ICD-9-CM: V58.69  8/14/2019 Yes    Overview Signed 8/15/2019 12:21 PM by Eb Whitfield MD     On Atorvastatin             Vitamin B12 deficiency anemia (Chronic) ICD-10-CM: D51.9  ICD-9-CM: 281.1  8/14/2019 Yes    Overview Signed 8/19/2019  1:24 PM by Eb Whitfield MD     Vitamin B12 (8/14/2019) = 208             Iron deficiency anemia (Chronic) ICD-10-CM: D50.9  ICD-9-CM: 280.9  8/14/2019 Yes    Overview Signed 8/19/2019  1:25 PM by Eb Whitfield MD     Anemia work-up (8/14/2019) showed serum iron 22, TIBC 371, iron % saturation 6, ferritin 34, reticulocyte count 1.4               Refusal of statin medication by patient ICD-10-CM: Z53.29  ICD-9-CM: V64.2  8/13/2019 Yes    Overview Signed 8/15/2019  2:01 PM by Eb Whitfield MD     As per note of Neurology (Dr. Sher Norton), patient refuses statin agent because of potential side effects.               * (Principal) Acute ischemic stroke ([facility] ICD-10-CM: I63.9  ICD-9-CM: 434.91  8/12/2019 Yes    Overview Signed 8/15/2019 12:17 AM by Eb Whitfield MD     Acute Ischemic Stroke (acute/early subacute infarct at the left posterior basal ganglia to corona radiata) with residual right hemiparesis, dysphagia and dysarthria             Impaired mobility and ADLs ICD-10-CM: Z74.09  ICD-9-CM: 799.89  8/12/2019 Yes        Received intravenous tissue plasminogen activator (tPA) in emergency department ICD-10-CM: Z92.82  ICD-9-CM: V45.88  8/12/2019 Yes        Right hemiparesis ([facility] ICD-10-CM: G81.91  ICD-9-CM: 342.90  8/12/2019 Yes        Dysphagia ICD-10-CM: R13.10  ICD-9-CM: 787.20  8/12/2019 Yes        Dysarthria ICD-10-CM: R47.1  ICD-9-CM: 784.51  8/12/2019 Yes              Background:   Past Medical History:   Past Medical History:   Diagnosis Date    [facility] ([facility] 8/12/2019    Acute Ischemic Stroke (acute/early subacute infarct at the left posterior basal ganglia to corona radiata) with residual right hemiparesis, dysphagia and dysarthria    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     On Allopurinol    Chronic hypokalemia     Persistent chronic hypokalemia + hypertension; ?primary hyperaldosteronism    CKD (chronic kidney disease) stage 2, GFR 60-89 ml/min 8/15/2019    Current use of aspirin 8/14/2019    Dysarthria 8/12/2019    Dysphagia 8/12/2019    Elevated prostate specific antigen (PSA) 7/21/2011    First degree atrioventricular block by electrocardiogram 8/12/2019    Hypertensive heart and kidney disease without heart failure and with stage 2 chronic kidney disease     Iron deficiency anemia 8/14/2019    Anemia work-up (8/14/2019) showed serum iron 22, TIBC 371, iron % saturation 6, ferritin 34, reticulocyte count 1.4     Obesity, Class I, BMI 30-34.9     Obstructive sleep apnea on CPAP     On statin therapy due to risk of future cardiovascular event 8/14/2019    On Atorvastatin    Pure hypercholesterolemia 08/15/2019    Lipid profile (8/13/2019) showed , , HDL 42, ; Lipid profile (8/14/2019) showed , , HDL 39, LDL 94    Received intravenous tissue plasminogen activator (tPA) in emergency department 8/12/2019    Refusal of statin medication by patient 8/13/2019    As per note of Neurology (Dr. Angelito Cruz), patient refuses statin agent because of potential side effects.  Right hemiparesis ([address] 8/12/2019    Type 2 diabetes mellitus with stage 2 chronic kidney disease (HCC)     HbA1c (8/13/2019) = 6.6    Vitamin B12 deficiency anemia 8/14/2019    Vitamin B12 (8/14/2019) = 208    Vitamin D deficiency 8/19/2019    Vitamin D 25-Hydroxy (8/19/2019) = 16.2       Patient taking anticoagulants yes Heparin, ASA   Patient has a defibrillator: no     Assessment:   Changes in Assessment throughout shift: No     Patient has central line: no Reasons if yes: Insertion date: Last dressing date:   Patient has Boykin Cath: no Reasons if yes:     Insertion date:     Last Vitals:     Vitals:    08/18/19 2206 08/19/19 0608 08/19/19 0737 08/19/19 1338   BP: 152/72 (!) 194/91 170/72 166/75   Pulse: 73 75 70 72   Resp:   16    Temp:   97.3 °F (36.3 °C)    SpO2:   96%    Weight:       Height:            PAIN    Pain Assessment    Pain Intensity 1: 0 (08/19/19 1730) Pain Intensity 1: 2 (12/29/14 1105)    Pain Location 1: Shoulder Pain Location 1: Abdomen    Pain Intervention(s) 1: Medication (see MAR) Pain Intervention(s) 1: Medication (see MAR)  Patient Stated Pain Goal: 0 Patient Stated Pain Goal: 0  o Intervention effective: no    o Other actions taken for pain:      Skin Assessment  Skin color Skin Color: Appropriate for ethnicity  Condition/Temperature Skin Condition/Temp: Dry, Warm  Integrity Skin Integrity: Tear  Turgor Turgor: Non-tenting  Weekly Pressure Ulcer Documentation  Pressure  Injury Documentation: No Pressure Injury Noted-Pressure Ulcer Prevention Initiated  Wound Prevention & Protection Methods  Orientation of wound Orientation of Wound Prevention: Posterior  Location of Prevention Location of Wound Prevention: Buttocks, Sacrum/Coccyx  Dressing Present Dressing Present : Yes  Dressing Status Dressing Status: Intact  Wound Offloading Wound Offloading (Prevention Methods): Bed, pressure redistribution/air     INTAKE/OUPUT  Date 08/18/19 1900 - 08/19/19 0659 08/19/19 0700 - 08/20/19 0659   Shift 2444-0336 24 Hour Total 6582-2169 8730-1728 24 Hour Total   INTAKE   P.O.  240 1200  1200     P. O.  240 1200  1200   Shift Total(mL/kg)  240(2.4) 1200(11.8)  1200(11.8)   OUTPUT   Urine(mL/kg/hr) 750 750 150(0.1)  150     Urine Voided 750 750 150  150     Urine Occurrence(s) 5 x 10 x 5 x  5 x   Emesis/NG output          Emesis Occurrence(s)   0 x  0 x   Stool          Stool Occurrence(s) 0 x 1 x 1 x  1 x   Shift Total(mL/kg) 750(7.4) 750(7.4) 150(1.5)  150(1.5)   NET -750 -510 1050  1050   Weight (kg) 102 102 102 102 102       Recommendations:  1. Patient needs and requests:     2. Diet: Cardiac Mech Soft Nectar Thick     3. Pending tests/procedures:      4.  Functional Level/Equipment:     5. Estimated Discharge Date: 8/24/19 Posted on Whiteboard in Patients Room: yes     HEALS Safety Check    A safety check occurred in the patient's room between off going nurse and oncoming nurse listed above. The safety check included the below items  Area Items   H  High Alert Medications - Verify all high alert medication drips (heparin, PCA, etc.)   E  Equipment - Suction is set up for ALL patients (with diann)  - Red plugs utilized for all equipment (IV pumps, etc.)  - WOWs wiped down at end of shift.  - Room stocked with oxygen, suction, and other unit-specific supplies   A  Alarms - Bed alarm is set for fall risk patients  - Ensure chair alarm is in place and activated if patient is up in a chair   L  Lines - Check IV for any infiltration  - Boykin bag is empty if patient has a Boykin   - Tubing and IV bags are labeled   S  Safety   - Room is clean, patient is clean, and equipment is clean. - Hallways are clear from equipment besides carts. - Fall bracelet on for fall risk patients  - Ensure room is clear and free of clutter  - Suction is set up for ALL patients (with diann)  - Hallways are clear from equipment besides carts.    - Isolation precautions followed, supplies available outside room, sign posted

## 2019-08-20 NOTE — INTERDISCIPLINARY ROUNDS
[facility] PHYSICAL REHABILITATION  [address] [place] [address], [address]    INPATIENT REHABILITATION  PRE-TEAM CONFERENCE SUMMARY     Date of Conference: 8/20/2019    Patient Information:        Name: Esperanza Francisco Age / Sex: 71 y.o. / male   CSN: [id number] MRN: [id number]   [facility] Date: 8/15/2019 Length of Stay: 5 days     Primary Rehabilitation Diagnosis  1. Impaired Mobility and ADLs  2. Acute Ischemic Stroke (acute/early subacute infarct at the left posterior basal ganglia to corona radiata) with residual right hemiparesis, dysphagia and dysarthria     Comorbidities   Obesity, Class I, BMI 30-34.9 E66.9    Obstructive sleep apnea on CPAP G47.33, Z99.89    Hypertensive heart and kidney disease without heart failure and with stage 2 chronic kidney disease I13.10, N18.2    Chronic hypokalemia E87.6    CKD (chronic kidney disease) stage 2, GFR 60-89 ml/min N18.2    Current use of aspirin Z79.82    On statin therapy due to risk of future cardiovascular event Z79.899    Type 2 diabetes mellitus with stage 2 chronic kidney disease  E11.22, N18.2    Pure hypercholesterolemia E78.00    Elevated prostate specific antigen (PSA) R97.20    Refusal of statin medication by patient Z48.34    First degree atrioventricular block by electrocardiogram I44.0    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 20X0    Leukocytosis D72.829    Iron deficiency anemia D50.9    Vitamin B12 anemia D51.9    Vitamin D deficiency E55. 9          Therapy:     FIM SCORES Initial Assessment Weekly Progress Assessment 8/20/2019   Eating Functional Level: 5  Comments: Pt reporting requiring set-up of container mgmt for self feeding. Pt able to use dominant left hand to manage utensils.   5 (S/U)   Swallowing   3-Mod+ cues   Grooming 3  3   Bathing 2  2      Upper Body Dressing Functional Level: 4  Items Applied/Steps Completed: Pullover (4 steps)  Comments: Pt educated on karthik technique to cecily shirt with Michael overall. Pt doffed shirt with SBA. 4   Lower Body Dressing Functional Level: 2  Items Applied/Steps Completed: Elastic waist pants (3 steps), Sock, left (1 step), Sock, right (1 step), Underpants (3 steps)  Comments: Pt doffed socks with modA, donned socks with TA. LB clothing score reflected from pt performing toileting task, requiring maxA for clothing mgmt   2   Toileting Functional Level: 2  Comments: Pt able to initiate to wipe but requiring MaxA for thoroughness. Pt required maxA overall for clothing mgmt with pt initiating to assist. Pt encouraged to focus on steadying balance in standing with additional person present assisting for clothing and buttocks cleansing. 2   Bladder - level of assist 5     Bladder - accident frequency score 6     Bowel - level of assist 2     Bowel - accident frequency score 6     Toilet Transfer [place] Toilet Transfer Score: 1  Comments: modA x 2. Second staff member present for safety and (A) with initial sit<>stand. 3   Tub/Shower Transfer [place] Tub or Shower Type: Tub/Shower combination  Tub/Shower Transfer Score: 0  Comments: NT 2/2 to safety and decreased functional transfer (I).  Bathing performed at sink this AM     0   Comprehension Primary Mode of Comprehension: Auditory  Score: 4     5   Expression Primary Mode of Expression: Verbal  Score: 4  Comments: Pt requiring increased time and repetitions to make needs known 2/2 to facial droop impeding expression      4   Social Interaction Score: 4  Comments: Pt interacting appropriately with OT   5   Problem Solving Score: 4  Comments: Pt requiring Michael for problem solving body mechanics prior to toileting transfer   3   Memory Score: 4  4     FIM SCORES Initial Assessment Weekly Progress Assessment 8/20/2019   Bed/Chair/Wheelchair Transfers Transfer Type: (stand step without assistive device)  Other: stand step txfr without AD  Transfer Assistance : 2 (Maximal assistance)  Sit to Stand Assistance: Maximum assistance Other: stand step without device  Transfer Assistance : 3 (Moderate assistance )  Sit to Stand Assistance: Minimal assistance(to mod A)   Bed Mobility Rolling Right 6 (Modified independent)   Rolling Left 3 (Moderate assistance )   Supine to Sit 3 (Moderate assistance)   Sit to Stand Maximum assistance   Sit to Supine 3 (Moderate assistance)    Rolling Right       Rolling Left       Supine to Sit   4 (Minimal assistance)(on the right side)   Sit to Stand   Minimal assistance(to mod A)   Sit to Supine   5 (Supervision)      Locomotion (W/C) Able to Propel (ft): 300 feet  Functional Level: 5  Curbs/Ramps Assist Required (FIM Score): 0 (Not tested)  Wheelchair Setup Assist Required : 3 (Moderate assistance)(for right brake and leg rest)  Wheelchair Management: Manages left brake Function    Setup Assistance    propels >300 feet mod I after set up  Assistance needed for right brake, leg rest and arm tray     Locomotion (W/C distance)       Locomotion (Walk) 2 (Maximal assistance)        Locomotion (Walk dist.) 3 Feet (ft)     Steps/Stairs             Nursing:     Neuro:   AAA&O x 4           Respiratory:   [x] WNL   [] O2 LPM:   Other:  Peripheral Vascular:   [] TEDS present   [] Edema present ____ Grade   Cardiac:   [x] WNL   [] Other  Genitourinary:   [x] continent   [] incontinent   [] perkins  Abdominal 8/19/19_______ LBM  GI: _cardiac soft______ Diet _nectar thick_____ Liquids _____ tube feeds  Musculoskeletal: ____ ROM Transfers _____ Assistive Device Used  _mod___ Level of Assistance  Skin Integumentary:   [x] Intact   [] Not Intact   __________Preventative Measures  Details______________________________________________________________  Pain: [x] Controlled   [] Not Controlled   Pain Meds:   [] Scheduled   [] PRN        Registered Dietitian / Nutrition:     Patient Vitals for the past 100 hrs:   % Diet Eaten   08/20/19 0951 100 %   08/20/19 0909 100 %   08/19/19 1730 100 %   08/19/19 1338 100 %   08/19/19 0959 100 %   08/18/19 0959 100 %   08/17/19 1353 100 %   08/17/19 0956 100 %   08/16/19 1755 100 %   08/16/19 1325 80 %     Pt continues with good meal intake/appetite. Tolerating diet. Denied having nutritional questions or concerns at this time. Supplements:          [] Yes   [x] No      Amount of supplement consumed: not applicable      Intake/Output Summary (Last 24 hours) at 8/20/2019 1429  Last data filed at 8/20/2019 0951  Gross per 24 hour   Intake 900 ml   Output 150 ml   Net 750 ml                                Last bowel movement: 8/18      Interdisciplinary Team Goals:     1. Discipline  Physical Therapy    Goal  To perform standing transfers at a min A level without using compensatory strategies    Barrier  decreased strength, balance, and coordination    Intervention  transfer training, therapeutic exercises, therapeutic activities, neuro re-education    Goal written by:   Maricel Kinney, PT     2. Discipline  Occupational Therapy    Goal  Pt will participate in therapy session requiring <2x v/c's for slowing pace and engaging in energy conservation for increased right UE safety and (I) activity pacing/monitoring     Barrier  Impulsiveness, decreased insight into barriers, decreased energy conversation awareness    Intervention  Education, repetition    Goal written by:  Annie Lee, OTR/L     3. Discipline  Speech Therapy    Goal  Patient will use safe swallowing strategies for all PO intake with min cues and no overt s/s of aspiration in 5/6 meals. Barrier  Dysphagia, mild cognitive impairment, mild dysarthria    Intervention  Oral/pharyngeal strengthening exercises, speech drill, training in safe swallowing techniques, monitor ad advance diet as warranted, MBS as needed to advance liquid consistency    Goal written by:  Christiano Honeycutt [facility]     4. Discipline  Nursing    Goal  Pt. Will remain free of pressure ulcers by discharge.     Barrier  Acute Ischemic stroke, right sided hemipaeresis Intervention  Inspect skin routinely, Instruct pt. On repositioning at least every 2 hrs. Goal written by:  Johnny Alford RN     5. Discipline  Clinical Psychology    Goal  Increase insight about stroke occurrence and recovery    Barrier  Limited insight about all parameters of recovery    Intervention  Patient/stroke education    Goal written by:  Wolf Dia, PhD     6. Discipline  Nutrition / Dietetics    Goal  Po intake of meals will meet >75% of patient estimated nutritional needs within the next 5-7 days. Outcome:  [x] Met/Ongoing    [] Progressing towards goal    [] Not progressing towards goal    [] New/Initial goal       Barrier  none known    Intervention  continue po diet    Goal written by:  Tashia Barillas RD       Disposition / Discharge Planning:      Follow-up services:  [x] Physical Therapy             [x] Occupational Therapy       [x] Speech Therapy           [] Skilled Nursing      [] Medical Social Worker   [] Aide        [] Outpatient     [x] Home Health   [] [address]   [] [facility] recommendations:  TBD   Estimated discharge date:  9/12/19   Discharge Location:  [x] Home   [] [facility]   [] [facility]             [] Other:          Interdisciplinary team rounds were held this PM with the following team members:       Name   Physical Therapist    Reynaldo Christian, PT, DPT   Occupational Therapist    Dean Rea, OTR/L   Speech Therapist    Nora Barillas, [address] RN   Physician    Meka Carlisle MD        TEAGAN Molina       Signed:  Meka Carlisle MD    August 20, 2019

## 2019-08-21 LAB
ALDOST SERPL-MCNC: 8.8 NG/DL (ref 0–30)
GASTRIN SERPL-MCNC: <10 PG/ML (ref 0–115)
GLUCOSE BLD STRIP.AUTO-MCNC: 106 MG/DL (ref 70–110)
GLUCOSE BLD STRIP.AUTO-MCNC: 123 MG/DL (ref 70–110)
GLUCOSE BLD STRIP.AUTO-MCNC: 124 MG/DL (ref 70–110)
GLUCOSE BLD STRIP.AUTO-MCNC: 130 MG/DL (ref 70–110)
Lab: ABNORMAL
METHYLMALONATE SERPL-SCNC: 468 NMOL/L (ref 0–378)
RENIN PLAS-CCNC: 0.3 NG/ML/HR (ref 0.17–5.38)

## 2019-08-21 PROCEDURE — 65310000000 HC RM PRIVATE REHAB

## 2019-08-21 PROCEDURE — 97110 THERAPEUTIC EXERCISES: CPT

## 2019-08-21 PROCEDURE — 97116 GAIT TRAINING THERAPY: CPT

## 2019-08-21 PROCEDURE — 97112 NEUROMUSCULAR REEDUCATION: CPT

## 2019-08-21 PROCEDURE — 97535 SELF CARE MNGMENT TRAINING: CPT

## 2019-08-21 PROCEDURE — 82962 GLUCOSE BLOOD TEST: CPT

## 2019-08-21 PROCEDURE — 74011250637 HC RX REV CODE- 250/637: Performed by: INTERNAL MEDICINE

## 2019-08-21 PROCEDURE — 74011250636 HC RX REV CODE- 250/636: Performed by: INTERNAL MEDICINE

## 2019-08-21 PROCEDURE — 92526 ORAL FUNCTION THERAPY: CPT

## 2019-08-21 PROCEDURE — 77030037878 HC DRSG MEPILEX >48IN BORD MOLN -B

## 2019-08-21 PROCEDURE — 92507 TX SP LANG VOICE COMM INDIV: CPT

## 2019-08-21 RX ADMIN — LABETALOL HYDROCHLORIDE 400 MG: 100 TABLET, FILM COATED ORAL at 08:01

## 2019-08-21 RX ADMIN — ATORVASTATIN CALCIUM 80 MG: 40 TABLET, FILM COATED ORAL at 22:03

## 2019-08-21 RX ADMIN — FERROUS SULFATE TAB 325 MG (65 MG ELEMENTAL FE) 325 MG: 325 (65 FE) TAB at 08:01

## 2019-08-21 RX ADMIN — ISOSORBIDE DINITRATE 30 MG: 20 TABLET ORAL at 13:31

## 2019-08-21 RX ADMIN — LABETALOL HYDROCHLORIDE 400 MG: 100 TABLET, FILM COATED ORAL at 22:02

## 2019-08-21 RX ADMIN — CYANOCOBALAMIN TAB 1000 MCG 1000 MCG: 1000 TAB at 08:01

## 2019-08-21 RX ADMIN — HEPARIN SODIUM 5000 UNITS: 5000 INJECTION INTRAVENOUS; SUBCUTANEOUS at 13:30

## 2019-08-21 RX ADMIN — ASPIRIN 81 MG 81 MG: 81 TABLET ORAL at 08:01

## 2019-08-21 RX ADMIN — AMLODIPINE BESYLATE 10 MG: 10 TABLET ORAL at 08:01

## 2019-08-21 RX ADMIN — HEPARIN SODIUM 5000 UNITS: 5000 INJECTION INTRAVENOUS; SUBCUTANEOUS at 22:03

## 2019-08-21 RX ADMIN — Medication 5000 UNITS: at 08:01

## 2019-08-21 RX ADMIN — ISOSORBIDE DINITRATE 30 MG: 20 TABLET ORAL at 22:03

## 2019-08-21 RX ADMIN — HEPARIN SODIUM 5000 UNITS: 5000 INJECTION INTRAVENOUS; SUBCUTANEOUS at 06:45

## 2019-08-21 RX ADMIN — HYDRALAZINE HYDROCHLORIDE 75 MG: 50 TABLET, FILM COATED ORAL at 13:30

## 2019-08-21 RX ADMIN — HYDRALAZINE HYDROCHLORIDE 75 MG: 50 TABLET, FILM COATED ORAL at 06:45

## 2019-08-21 RX ADMIN — Medication 400 MG: at 08:01

## 2019-08-21 RX ADMIN — HYDRALAZINE HYDROCHLORIDE 75 MG: 50 TABLET, FILM COATED ORAL at 22:02

## 2019-08-21 RX ADMIN — ISOSORBIDE DINITRATE 30 MG: 20 TABLET ORAL at 06:45

## 2019-08-21 RX ADMIN — Medication 250 MG: at 08:01

## 2019-08-21 NOTE — PROGRESS NOTES
[facility]  [address] [place] [address], [address]     INPATIENT REHABILITATION  DAILY PROGRESS NOTE     Date: 8/21/2019    Name: Sumeet Poe Age / Sex: 71 y.o. / male   CSN: [id number] MRN: [id number]   [facility] Date: 8/15/2019 Length of Stay: 6 days     Primary Rehab Diagnosis: Impaired Mobility and ADLs secondary to Acute Ischemic Stroke (acute/early subacute infarct at the left posterior basal ganglia to corona radiata) with residual right hemiparesis, dysphagia and dysarthria      Subjective:     Patient seen and examined. Blood pressure better controlled. Blood glucose controlled. Patient's Complaint:   No significant medical complaints    Pain Control: no current joint or muscle symptoms, essentially pain-free      Objective:     Vital Signs:  Patient Vitals for the past 24 hrs:   BP Temp Pulse Resp SpO2   08/21/19 0758 128/65 98 °F (36.7 °C) 62 18 98 %   08/20/19 2159 179/74 97 °F (36.1 °C) 83 18 96 %   08/20/19 1600 139/76 97.1 °F (36.2 °C) 72 18 97 %   08/20/19 1352 144/73 -- 75 -- --        Physical Examination:  GENERAL SURVEY: Patient is awake, alert, oriented x 3, sitting comfortably on the chair, not in acute respiratory distress. HEENT: pink palpebral conjunctivae, anicteric sclerae, no nasoaural discharge, moist oral mucosa  NECK: supple, no jugular venous distention, no palpable lymph nodes  CHEST/LUNGS: symmetrical chest expansion, good air entry, clear breath sounds  HEART: adynamic precordium, good S1 S2, no S3, regular rhythm, no murmurs  ABDOMEN: obese, bowel sounds appreciated, soft, non-tender  EXTREMITIES: pink nailbeds, no edema except for mild right ankle swelling, full and equal pulses, no calf tenderness   NEUROLOGICAL EXAM: The patient is awake, alert and oriented x3, able to answer questions fairly appropriately, able to follow 1 and 2 step commands. Able to tell time from the wall clock.   Cranial nerves II-XII are grossly intact except for slightly shallow right nasolabial fold, dysarthria and dysphagia. No gross sensory deficit. Motor strength is 5/5 on the LUE and LLE, 0/5 on the RUE (except for 1/5 on the right shoulder), 2 to 2+/5 on RLE (except for 0/5 on the right ankle/foot).       Current Medications:  Current Facility-Administered Medications   Medication Dose Route Frequency    ferrous sulfate tablet 325 mg  1 Tab Oral DAILY WITH BREAKFAST    ascorbic acid (vitamin C) (VITAMIN C) tablet 250 mg  250 mg Oral DAILY WITH BREAKFAST    cyanocobalamin tablet 1,000 mcg  1,000 mcg Oral DAILY    cholecalciferol (VITAMIN D3) capsule 5,000 Units  5,000 Units Oral DAILY    labetalol (NORMODYNE) tablet 400 mg  400 mg Oral Q12H    isosorbide dinitrate (ISORDIL) tablet 30 mg  30 mg Oral Q8H    hydrALAZINE (APRESOLINE) tablet 25 mg  25 mg Oral TID PRN    acetaminophen (TYLENOL) tablet 650 mg  650 mg Oral Q4H PRN    bisacodyl (DULCOLAX) tablet 10 mg  10 mg Oral Q48H PRN    heparin (porcine) injection 5,000 Units  5,000 Units SubCUTAneous Q8H    insulin lispro (HUMALOG) injection   SubCUTAneous TIDAC    magnesium oxide (MAG-OX) tablet 400 mg  400 mg Oral DAILY    aspirin chewable tablet 81 mg  81 mg Oral DAILY WITH BREAKFAST    amLODIPine (NORVASC) tablet 10 mg  10 mg Oral DAILY    atorvastatin (LIPITOR) tablet 80 mg  80 mg Oral QHS    hydrALAZINE (APRESOLINE) tablet 75 mg  75 mg Oral Q8H       Allergies:  No Known Allergies      Functional Progress:    PHYSICAL THERAPY    ON ADMISSION MOST RECENT   Wheelchair Mobility/Management  Able to Propel (ft): 300 feet  Functional Level: 5  Curbs/Ramps Assist Required (FIM Score): 0 (Not tested)  Wheelchair Setup Assist Required : 3 (Moderate assistance)(for right brake and leg rest)  Wheelchair Management: Manages left brake Wheelchair Mobility/Management  Able to Propel (ft): 217 feet  Functional Level: 6  Curbs/Ramps Assist Required (FIM Score): 0 (Not tested)  Wheelchair Setup Assist Required : 3 (Moderate assistance)  Wheelchair Management: Manages left brake, Manages right brake(with extender on right)     Gait  Amount of Assistance: 2 (Maximal assistance)  Distance (ft): 3 Feet (ft)  Assistive Device: Cane, quad Gait  Amount of Assistance: 3 (Moderate assistance)  Distance (ft): 5 Feet (ft)  Assistive Device: Cane, quad     Balance-Sitting/Standing  Sitting - Static: Good (unsupported)  Sitting - Dynamic: Fair (occasional)  Standing - Static: (poor+)  Standing - Dynamic : Impaired Balance-Sitting/Standing  Sitting - Static: Good (unsupported)  Sitting - Dynamic: Fair (occasional)  Standing - Static: (fair-)  Standing - Dynamic : Impaired     Bed/Mat Mobility  Rolling Right : 6 (Modified independent)  Rolling Left : 3 (Moderate assistance )  Supine to Sit : 3 (Moderate assistance)  Sit to Supine : 3 (Moderate assistance) Bed/Mat Mobility  Rolling Right : 5 (Supervision)  Rolling Left : 4 (Minimal assistance)  Supine to Sit : 4 (Minimal assistance)(on the right side)  Sit to Supine : 5 (Supervision)     Transfers  Transfer Type: (stand step without assistive device)  Other: stand step txfr without AD  Transfer Assistance : 2 (Maximal assistance)  Sit to Stand Assistance: Maximum assistance Transfers  Transfer Type: SPT without device  Other: stand step without device  Transfer Assistance : 3 (Moderate assistance )  Sit to Stand Assistance: Minimal assistance(to mod A)                 Lab/Data Review:  Recent Results (from the past 24 hour(s))   GLUCOSE, POC    Collection Time: 08/20/19 12:10 PM   Result Value Ref Range    Glucose (POC) 123 (H) 70 - 110 mg/dL   GLUCOSE, POC    Collection Time: 08/20/19  5:06 PM   Result Value Ref Range    Glucose (POC) 123 (H) 70 - 110 mg/dL   GLUCOSE, POC    Collection Time: 08/20/19  9:54 PM   Result Value Ref Range    Glucose (POC) 130 (H) 70 - 110 mg/dL   GLUCOSE, POC    Collection Time: 08/21/19  7:21 AM   Result Value Ref Range    Glucose (POC) 130 (H) 70 - 110 mg/dL GLUCOSE, POC    Collection Time: 08/21/19 11:47 AM   Result Value Ref Range    Glucose (POC) 124 (H) 70 - 110 mg/dL       Estimated Glomerular Filtration Rate:  On admission, estimated GFR based on a Creatinine of 1.20 mg/dl:              Using CKD-EPI = 63.1 mL/min/1.73m2              Using MDRD = 64.6 mL/min/1.73m2  Most recent estimated GFR, based on a Creatinine of 1.26 mg/dl on 8/19/2019:   Using CKD-EPI = 59.5 mL/min/1.73m2   Using MDRD = 61 mL/min/1.73m2       Assessment:     Primary Rehabilitation Diagnosis  1. Impaired Mobility and ADLs  2. Acute Ischemic Stroke (acute/early subacute infarct at the left posterior basal ganglia to corona radiata) with residual right hemiparesis, dysphagia and dysarthria     Comorbidities   Obesity, Class I, BMI 30-34.9 E66.9    Obstructive sleep apnea on CPAP G47.33, Z99.89    Hypertensive heart and kidney disease without heart failure and with stage 2 chronic kidney disease I13.10, N18.2    Chronic hypokalemia E87.6    CKD (chronic kidney disease) stage 2, GFR 60-89 ml/min N18.2    Current use of aspirin Z79.82    On statin therapy due to risk of future cardiovascular event Z79.899    Type 2 diabetes mellitus with stage 2 chronic kidney disease  E11.22, N18.2    Pure hypercholesterolemia E78.00    Elevated prostate specific antigen (PSA) R97.20    Refusal of statin medication by patient Z48.34    First degree atrioventricular block by electrocardiogram I44.0    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 20X0    Leukocytosis D72.829    Iron deficiency anemia D50.9    Vitamin B12 anemia D51.9    Vitamin D deficiency E55. 9        Plan:     1. Justification for continued stay: Good progression towards established rehabilitation goals. 2. Medical Issues being followed closely:    [x]  Fall and safety precautions     []  Wound Care     [x]  Bowel and Bladder Function     [x]  Fluid Electrolyte and Nutrition Balance     []  Pain Control      3.  Issues that 24 hour rehabilitation nursing is following:    [x]  Fall and safety precautions     []  Wound Care     [x]  Bowel and Bladder Function     [x]  Fluid Electrolyte and Nutrition Balance     []  Pain Control      [x]  Assistance with and education on in-room safety with transfers to and from the bed, wheelchair, toilet and shower. 4. Acute rehabilitation plan of care:    [x]  Continue current care and rehab. [x]  Physical Therapy           [x]  Occupational Therapy           [x]  Speech Therapy     []  Hold Rehab until further notice     5. Medications:    [x]  MAR Reviewed     [x]  Continue Present Medications     6. DVT Prophylaxis:      []  Lovenox     [x]  Unfractionated Heparin     []  Coumadin     []  NOAC     []  SUZAN Stockings     []  Sequential Compression Device     []  None     7.  Orders:   > Acute Ischemic Stroke (acute/early subacute infarct at the left posterior basal ganglia to corona radiata) with residual right hemiparesis, dysphagia and dysarthria; S/P Administration of intravenous tPA (8/12/2019 - [facility])   > Continue:    > Aspirin 81 mg PO once daily with breakfast    > Atorvastatin 80 mg PO q HS    > Chronic hypokalemia    Serum Potassium during hospital stay at [facility]      08/15/19  0330 08/14/19  1848 08/14/19  0342 08/13/19  1205 08/12/19  2328   K 3.0* 2.8* 2.7* 2.8* 2.4*          > K (8/16/2019, on admission) = 3.8   > Mg (8/16/2019, on admission) = 1.9   > Upon admission to the ARU, patient was given Klor Con 40 meq PO BID with meals x 2 days   > K (8/17/2019) = 3.5   > K (8/18/2019) = 3.7   > K (8/19/2019) = 3.5   > Continue Mg(OH)2 400 mg PO once daily    > Hypertensive heart and kidney disease without heart failure and with stage 2 chronic kidney disease   > Upon admission to the ARU, increased Hydralazine from 50 mg to 75 mg PO q 8 hr (6AM, 2PM, 10PM)   > Once work-up for primary hyperaldosteronism is complete, plan to start Spironolactone 25 mg PO once daily   > On 8/16/2019:    > Discontinued Metoprolol succinate 50 mg PO once daily (6AM)    > Started:     > Labetalol 200 mg PO q 12 hr (9AM, 9PM)     > Isosorbide dinitrate 10 mg P) q 8 hr (6AM, 2PM, 10PM)   > On 8/18/2019, added Hydralazine 25 mg PO TID PRN for SBP greater than 170 mmHg   > On 8/19/2019:    > Increased Labetalol from 200 mg to 300 mg PO q 12 hr (9AM, 9PM)    > Increased Isosorbide dinitrate from 10 mg to 20 mg PO q 8 hr (6AM, 2PM, 10PM)   > On 8/20/2019:    > Increased Labetalol from 300 mg to 400 mg PO q 12 hr (9AM, 9PM)    > Increased Isosorbide dinitrate from 20 mg to 30 mg PO q 8 hr (6AM, 2PM, 10PM)   > Continue:    > Amlodipine 10 mg PO once daily (9AM)    > Hydralazine 75 mg PO q 8 hr (6AM, 2PM, 10PM)    > Labetalol 400 mg PO q 12 hr (9AM, 9PM)    > Isosorbide dinitrate 30 mg PO q 8 hr (6AM, 2PM, 10PM)    > Hydralazine 25 mg PO TID PRN for SBP greater than 170 mmHg    > Pure hypercholesterolemia   > Lipid profile (8/13/2019) showed , , HDL 42,    > Lipid profile (8/14/2019) showed , , HDL 39, LDL 94   > Continue Atorvastatin 80 mg PO q HS    > Type 2 diabetes mellitus with stage 2 chronic kidney disease   > HbA1c (4/16/2014) = 6.2   > Patient has had chronic kidney disease even before his diagnosis of Type 2 diabetes mellitus and is presumed to be due to prolonged uncontrolled hypertension   > HbA1c (8/13/2019) = 6.6   > Continue Humalog insulin sliding scale SC TID AC    >  ? Primary hyperaldosteronism as etiology of chronic hypokalemia and difficult to control hypertension   > Endocrinology consult (Dr. Conrad Santiago) was called at [facility] prior to discharge/transfer to the ARU   > Aldosterone/Renin activity (8/16/2019):    > Aldosterone = 8.8 (NORMAL)    > Renin Activity = 0.300 (NORMAL)    > Aldosterone/Renin Activity = 29 (ELEVATED)   > Once work-up for primary hyperaldosteronism is complete, plan to start Spironolactone 25 mg PO once daily   > Endocrinology consult (Dr. Reji Cross):    > ASSESSMENT:     1. Chronic hypokalemia. 2.  Diabetes mellitus. 3.  Diabetic nephropathy with a stage II chronic renal disease. 4.  Normocytic anemia. 5.  Vitamin B12 deficiency. 6.  Borderline iron deficiency. 7.  Hypoalbuminemia.     > DISCUSSION:  The patient may well have hyperaldosteronism, but he may have it on the basis of his nephropathy, which is due to his underlying diabetes. The cause of his anemia might be a combination of B12 deficiency and iron deficiency together, so investigation should be done for this.     > PLAN: Laboratory studies:     1.  24-hour urine for aldosteronism: PENDING     2. Methylmalonic acid level (8/18/2019) = 468 (ELEVATED)     3.  Gastrin level (8/20/2019) = <10 (NORMAL)     4. Microalbumin-to-creatinine ratio. 5.  Transferrin and prealbumin levels. > FSH (8/19/2019) = 5.6 (NORMAL)    > LH (8/18/2019) = 6.8 (NORMAL)    > Free T4 (8/18/2019) = 1.0 (NORMAL)    > TSH (8/18/2019) = 4.63 (ELEVATED)    > Urine aldosterone (8/20/2019): PENDING    > Leukocytosis, resolved   > WBC count (8/15/2019) = 11.7   > No fever documented since admission to the ARU   > WBC count (8/16/2019, on admission) = 17.9   > Work-up:    > Urinalysis (8/16/2019): WNL    > Chest x-ray (PA-Lateral) (8/16/2019) showed a minimally blunted left posterior costophrenic angle could be due to either a tiny effusion or chronic pleural reaction/scarring; mild cardiomegaly; atherosclerosis.    > WBC count (8/18/2019) = 11.6    > Iron deficiency anemia / Vitamin B12 anemia   > Anemia work-up (8/14/2019) showed serum iron 22, TIBC 371, iron % saturation 6, ferritin 34, reticulocyte count 1.4   > Vitamin B12 (8/14/2019) = 208   > Hgb/Hct (8/15/2019) = 10.1/31.3    > Hgb/Hct (8/16/2019, on admission) = 10.7/32.6   > Hgb/Hct (8/18/2019) = 8.7/27.4   > Hgb/Hct (8/19/2019) = 8.4/25.2   > On 8/19/2019, started:     > FeSO4 325 mg PO once daily with breakfast     > Ascorbic Acid 250 mg PO once daily with breakfast (to enhance the absorption of the FeSO4)     > Cyanocobalamin 1,000 mcg PO once daily    > Epoetin tone 10,000 units SC x 1 dose   > Continue:    > FeSO4 325 mg PO once daily with breakfast     > Ascorbic Acid 250 mg PO once daily with breakfast (to enhance the absorption of the FeSO4)     > Cyanocobalamin 1,000 mcg PO once daily    > Vitamin D Deficiency   > PTH (8/18/2019) = 101.7   > Vitamin D 25-Hydroxy (8/19/2019) = 16.2   > On 8/19/2019, patient was given Cholecalciferol 50,000 units PO x 1 dose    > On 8/20/2019, started Cholecalciferol 5,000 units PO once daily    > Diet:   > On 8/16/2019, solids consistency was advanced from Mechanical soft (NDD 2) to Soft solids (NDD 3)   > Specifications: Diabetic, low saturated fat   > Solids (consistency): Soft solids (NDD 3)   > Liquids (consistency): Mildly thick ([place]-thick)       8. Patient's progress in rehabilitation and medical issues discussed with the patient. All questions answered to the best of my ability. Care plan discussed with patient, wife and nurse.       Signed:    Kyle Castleman, MD    August 21, 2019

## 2019-08-21 NOTE — PROGRESS NOTES
Problem: Dysphagia (Adult)  Goal: *Speech Goal: (INSERT TEXT)  Description  Long term goals  Patient will:  1. Tolerate regular diet with thin liquids using safe swallowing techniques without s/s of aspiration in 5/6 meals. 2. Use safe swallowing techniques (including slowed rate, small bites/sips, alternate liquids/solids, effortful swallow, head rotation to right for liquids) with supervision. 3. Participate in MBS study prior to advancing to thin liquids to assure safety (no aspiration). 4. Perform oral and pharyngeal strengthening exercises (to improve speech and swallowing) with supervision-modified independence. 5. Demonstrate 90% intelligibility in conversation using appropriate speaking strategies (slowed rate, overarticulation, increased volume). 6. Recall 3 words after 5 minutes with supervision. 7. Perform functional problem solving/reasoning tasks with 90% accuracy. 8. Name 8-10 items in categories of increasing abstraction, supervision. Short term goals (by 8/23/19)  Patient will:   1. Tolerate soft diet with nectar thick liquids using safe swallowing techniques without s/s of aspiration in 5/6 meals. 2. Use safe swallowing techniques (including slowed rate, small bites/sips, alternate liquids/solids, effortful swallow, head rotation to right for liquids) with min cues. 3. Tolerate small amounts of ice chips with the SLP using head rotation to the right and/or chin tuck, without overt s/s of aspiration. 4. Perform oral and pharyngeal strengthening exercises (to improve speech and swallowing) with mod-min assist.  5. Demonstrate 90% intelligibility in single words of up to 4 syllables using appropriate speaking strategies (slowed rate, overarticulation, increased volume). 6. Recall 3 words after 5 minutes with mod assist.  7. Perform functional problem solving/reasoning tasks with 75-85% accuracy. 8. Name 8-10 items in concrete categories, supervision.      Note:   SPEECH LANGUAGE PATHOLOGY TREATMENT    Patient: Frederick Santiago (89 y.o. male)  Date: 8/21/2019  Diagnosis: Acute ischemic stroke ([facility] [address] [I63.9] Acute ischemic stroke ([facility] [address]       Time in: 0830 1230 1330  Time Out:  0900 1300 1400  SUBJECTIVE:   Patient stated We've been  for 27 years. OBJECTIVE:   Mental Status:  Mr. Rudolph Obando was awake and alert, but became very frustrated with SLP's cues at mealtime.+    Treatment & Interventions:   Patient was seen in the dining room at mealtime for breakfast and lunch. He continues to receive an advanced soft diet with nectar thick liquids. Mr. Rudolph Oabndo needs moderate to max cues for use of safe swallowing techniques. He continues to eat very quickly, take large bites/sips, not clear one bolus prior to adding another, and occasionally talk with food in his mouth. Despite consistent cuing by the SLP and his wife, he persists with these behaviors. Mr. Rudolph Obando coughed after both liquid and solid boluses at both meals due to not following cues or remembering to use the identified strategies. Nevertheless, he continues to ask when he will be able to drink water. Mr. Rudolph Obando was also seen for thirty minute speech therapy session in the SLP's office. The following treatment tasks were presented: Motor Speech/Dysphagia:  Review of speaking strategies: Mod cues for recall  Words with strong /k/:   85% strong productions  Sentence drill:    90% accuracy/intelligibility  Laryngeal strengthening exercises: Mod assist  Review of swallowing strategies: Mod cues for recall  Trials with ice chips:   Min-mod cues for head rotation       Mod-max cues for repeat swallow       Cough after 4 small boluses  Trials with water:   Wet voice and cough     Neuro-Linguistics:  Orientation:    Independent  Recent memory:   Supervision    Response & Tolerance to Activities:  Mr. Rudolph Obando is not happy with the level of cuing provided by the SLP during PO intake.   However, even with this cuing, he is not consistent in use of safe swallowing techniques. Patient became angry with the SLP at lunchtime and verbalized his wish for her to be quiet. Mrs. Lindsay Momin reinforced the rationale and his need for cues for safe swallowing. She also informed the SLP after the meal that his quick temper has been a longstanding issue. Pain:  Pain Scale 1: Numeric (0 - 10)  No report of pain     After treatment:   ?       Patient left in no apparent distress sitting up in chair  ? Patient left in no apparent distress in bed  ? Call bell left within reach  ? Nursing notified  ? Caregiver present  ? Bed alarm activated    ASSESSMENT:   Progression toward goals:  ?       Improving appropriately and progressing toward goals  ? Improving slowly and progressing toward goals  ? Not making progress toward goals and plan of care will be adjusted    PLAN:   Patient continues to benefit from skilled intervention to address the above impairments. Continue treatment per established plan of care.   Discharge Recommendations:  Home Health    Estimated LOS: Through 9/6/19    SILVERIO Davila  Time Calculation:  90 Minutes

## 2019-08-21 NOTE — PROGRESS NOTES
Problem: Diabetes Self-Management  Goal: *Disease process and treatment process  Description  Define diabetes and identify own type of diabetes; list 3 options for treating diabetes. Outcome: Progressing Towards Goal  Goal: *Incorporating nutritional management into lifestyle  Description  Describe effect of type, amount and timing of food on blood glucose; list 3 methods for planning meals. Outcome: Progressing Towards Goal  Goal: *Incorporating physical activity into lifestyle  Description  State effect of exercise on blood glucose levels. Outcome: Progressing Towards Goal  Goal: *Developing strategies to promote health/change behavior  Description  Define the ABC's of diabetes; identify appropriate screenings, schedule and personal plan for screenings. Outcome: Progressing Towards Goal     Problem: Falls - Risk of  Goal: *Absence of Falls  Description  Document Margrett Bernheim Fall Risk and appropriate interventions in the flowsheet. Outcome: Progressing Towards Goal  Note:   Fall Risk Interventions:  Mobility Interventions: Bed/chair exit alarm    Mentation Interventions: Bed/chair exit alarm    Medication Interventions: Bed/chair exit alarm    Elimination Interventions: Bed/chair exit alarm              Problem: Patient Education: Go to Patient Education Activity  Goal: Patient/Family Education  Outcome: Progressing Towards Goal     Problem: Pressure Injury - Risk of  Goal: *Prevention of pressure injury  Description  Document Bartolome Scale and appropriate interventions in the flowsheet.   Outcome: Progressing Towards Goal  Note:   Pressure Injury Interventions:  Sensory Interventions: Assess changes in LOC    Moisture Interventions: Maintain skin hydration (lotion/cream)    Activity Interventions: Pressure redistribution bed/mattress(bed type)    Mobility Interventions: Pressure redistribution bed/mattress (bed type)    Nutrition Interventions: Document food/fluid/supplement intake    Friction and Shear Interventions: HOB 30 degrees or less                Problem: Patient Education: Go to Patient Education Activity  Goal: Patient/Family Education  Outcome: Progressing Towards Goal

## 2019-08-21 NOTE — ROUTINE PROCESS
SHIFT CHANGE NOTE FOR [facility]    Bedside and Verbal shift change report given to Gabriel RN (oncoming nurse) by Sim Frank RN   (offgoing nurse). Report included the following information SBAR, Kardex, MAR and Recent Results.     Situation:   Code Status: Full Code   Reason for Admission: CVA  Hospital Day: 6   Problem List:   Hospital Problems  Date Reviewed: 8/21/2019          Codes Class Noted POA    Vitamin D deficiency (Chronic) ICD-10-CM: E55.9  ICD-9-CM: 268.9  8/19/2019 Yes    Overview Signed 8/19/2019  1:34 PM by Lianet James MD     Vitamin D 25-Hydroxy (8/19/2019) = 16.2              Leukocytosis ICD-10-CM: E96.265  ICD-9-CM: 288.60  8/16/2019 Yes    Overview Signed 8/16/2019 11:19 AM by Lianet James MD     WBC count (8/16/2019) = 17.9             Hypertensive heart and kidney disease without heart failure and with stage 2 chronic kidney disease (Chronic) ICD-10-CM: I13.10, N18.2  ICD-9-CM: 404.90, 585.2  Unknown Yes        Chronic hypokalemia ICD-10-CM: E87.6  ICD-9-CM: 276.8  Unknown Yes    Overview Addendum 8/15/2019 11:31 AM by Lianet James MD     Persistent chronic hypokalemia + hypertension; ?primary hyperaldosteronism             Type 2 diabetes mellitus with stage 2 chronic kidney disease (HCC) (Chronic) ICD-10-CM: E11.22, N18.2  ICD-9-CM: 250.40, 585.2  Unknown Yes    Overview Signed 8/15/2019 12:26 PM by Lianet James MD     HbA1c (8/13/2019) = 6.6             Pure hypercholesterolemia (Chronic) ICD-10-CM: E78.00  ICD-9-CM: 272.0  8/15/2019 Yes    Overview Addendum 8/15/2019  6:27 PM by Lianet James MD     Lipid profile (8/13/2019) showed , , HDL 42, ; Lipid profile (8/14/2019) showed , , HDL 39, LDL 94             Current use of aspirin ICD-10-CM: Z79.82  ICD-9-CM: V58.66  8/14/2019 Yes        On statin therapy due to risk of future cardiovascular event ICD-10-CM: Z79.899  ICD-9-CM: V58.69  8/14/2019 Yes    Overview Signed 8/15/2019 12:21 PM by Randee Reed MD     On Atorvastatin             Vitamin B12 deficiency anemia (Chronic) ICD-10-CM: D51.9  ICD-9-CM: 281.1  8/14/2019 Yes    Overview Signed 8/19/2019  1:24 PM by Randee Reed MD     Vitamin B12 (8/14/2019) = 208             Iron deficiency anemia (Chronic) ICD-10-CM: D50.9  ICD-9-CM: 280.9  8/14/2019 Yes    Overview Signed 8/19/2019  1:25 PM by Randee Reed MD     Anemia work-up (8/14/2019) showed serum iron 22, TIBC 371, iron % saturation 6, ferritin 34, reticulocyte count 1.4               Refusal of statin medication by patient ICD-10-CM: Z53.29  ICD-9-CM: V64.2  8/13/2019 Yes    Overview Signed 8/15/2019  2:01 PM by Randee Reed MD     As per note of Neurology (Dr. Jordi oTdd), patient refuses statin agent because of potential side effects.               * (Principal) Acute ischemic stroke ([facility] ICD-10-CM: I63.9  ICD-9-CM: 434.91  8/12/2019 Yes    Overview Signed 8/15/2019 12:17 AM by Randee Reed MD     Acute Ischemic Stroke (acute/early subacute infarct at the left posterior basal ganglia to corona radiata) with residual right hemiparesis, dysphagia and dysarthria             Impaired mobility and ADLs ICD-10-CM: Z74.09  ICD-9-CM: 799.89  8/12/2019 Yes        Received intravenous tissue plasminogen activator (tPA) in emergency department ICD-10-CM: Z92.82  ICD-9-CM: V45.88  8/12/2019 Yes        Right hemiparesis ([facility] ICD-10-CM: G81.91  ICD-9-CM: 342.90  8/12/2019 Yes        Dysphagia ICD-10-CM: R13.10  ICD-9-CM: 787.20  8/12/2019 Yes        Dysarthria ICD-10-CM: R47.1  ICD-9-CM: 784.51  8/12/2019 Yes              Background:   Past Medical History:   Past Medical History:   Diagnosis Date    Acute ischemic stroke ([address] 8/12/2019    Acute Ischemic Stroke (acute/early subacute infarct at the left posterior basal ganglia to corona radiata) with residual right hemiparesis, dysphagia and dysarthria    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     On Allopurinol    Chronic hypokalemia     Persistent chronic hypokalemia + hypertension; ?primary hyperaldosteronism    CKD (chronic kidney disease) stage 2, GFR 60-89 ml/min 8/15/2019    Current use of aspirin 8/14/2019    Dysarthria 8/12/2019    Dysphagia 8/12/2019    Elevated prostate specific antigen (PSA) 7/21/2011    First degree atrioventricular block by electrocardiogram 8/12/2019    Hypertensive heart and kidney disease without heart failure and with stage 2 chronic kidney disease     Iron deficiency anemia 8/14/2019    Anemia work-up (8/14/2019) showed serum iron 22, TIBC 371, iron % saturation 6, ferritin 34, reticulocyte count 1.4     Obesity, Class I, BMI 30-34.9     Obstructive sleep apnea on CPAP     On statin therapy due to risk of future cardiovascular event 8/14/2019    On Atorvastatin    Pure hypercholesterolemia 08/15/2019    Lipid profile (8/13/2019) showed , , HDL 42, ; Lipid profile (8/14/2019) showed , , HDL 39, LDL 94    Received intravenous tissue plasminogen activator (tPA) in emergency department 8/12/2019    Refusal of statin medication by patient 8/13/2019    As per note of Neurology (Dr. Darryl Cardenas), patient refuses statin agent because of potential side effects.  Right hemiparesis ([address] 8/12/2019    Type 2 diabetes mellitus with stage 2 chronic kidney disease (HCC)     HbA1c (8/13/2019) = 6.6    Vitamin B12 deficiency anemia 8/14/2019    Vitamin B12 (8/14/2019) = 208    Vitamin D deficiency 8/19/2019    Vitamin D 25-Hydroxy (8/19/2019) = 16.2       Patient taking anticoagulants yes Heparin, ASA   Patient has a defibrillator: no     Assessment:   Changes in Assessment throughout shift: No     Patient has central line: no Reasons if yes: Insertion date: Last dressing date:   Patient has Boykin Cath: no Reasons if yes:     Insertion date:     Last Vitals:     Vitals:    08/20/19 1600 08/20/19 2159 08/21/19 0758 08/21/19 1330   BP: 139/76 179/74 128/65 162/74   Pulse: 72 83 62 71   Resp: 18 18 18 18   Temp: 97.1 °F (36.2 °C) 97 °F (36.1 °C) 98 °F (36.7 °C) 97.8 °F (36.6 °C)   SpO2: 97% 96% 98% 98%   Weight:       Height:            PAIN    Pain Assessment    Pain Intensity 1: 0 (08/21/19 1607) Pain Intensity 1: 2 (12/29/14 1105)    Pain Location 1: Shoulder Pain Location 1: Abdomen    Pain Intervention(s) 1: Medication (see MAR) Pain Intervention(s) 1: Medication (see MAR)  Patient Stated Pain Goal: 0 Patient Stated Pain Goal: 0  o Intervention effective: no    o Other actions taken for pain:      Skin Assessment  Skin color Skin Color: Appropriate for ethnicity  Condition/Temperature Skin Condition/Temp: Dry  Integrity Skin Integrity: Wound (add Wound LDA), Tear  Turgor Turgor: Non-tenting  Weekly Pressure Ulcer Documentation  Pressure  Injury Documentation: No Pressure Injury Noted-Pressure Ulcer Prevention Initiated  Wound Prevention & Protection Methods  Orientation of wound Orientation of Wound Prevention: Posterior  Location of Prevention Location of Wound Prevention: Sacrum/Coccyx  Dressing Present Dressing Present : Yes  Dressing Status Dressing Status: Intact  Wound Offloading Wound Offloading (Prevention Methods): Bed, pressure reduction mattress     INTAKE/OUPUT  Date 08/20/19 0700 - 08/21/19 0659 08/21/19 0700 - 08/22/19 0659   Shift 6315-0189 8734-5074 24 Hour Total 4206-6794 2215-9958 24 Hour Total   INTAKE   P.O. 1620  1620 720  720     P. O. 1620  1620 720  720   Shift Total(mL/kg) 1620(15.9)  1620(15.9) 720(7.1)  720(7.1)   OUTPUT   Urine(mL/kg/hr) 150(0.1)  150(0.1)        Urine Voided 150  150        Urine Occurrence(s) 8 x 2 x 10 x 4 x  4 x   Emesis/NG output           Emesis Occurrence(s) 0 x  0 x      Stool           Stool Occurrence(s) 2 x 0 x 2 x 1 x  1 x   Shift Total(mL/kg) 150(1.5)  150(1.5)      NET 1470  1470 720  720   Weight (kg) 102 102 102 102 102 102       Recommendations:  1. Patient needs and requests:     2.  Diet: Cardiac Mech Soft [place] Thick     3. Pending tests/procedures:      4. Functional Level/Equipment:     5. Estimated Discharge Date: 8/24/19 Posted on Whiteboard in Patients Room: yes     HEALS Safety Check    A safety check occurred in the patient's room between off going nurse and oncoming nurse listed above. The safety check included the below items  Area Items   H  High Alert Medications - Verify all high alert medication drips (heparin, PCA, etc.)   E  Equipment - Suction is set up for ALL patients (with diann)  - Red plugs utilized for all equipment (IV pumps, etc.)  - WOWs wiped down at end of shift.  - Room stocked with oxygen, suction, and other unit-specific supplies   A  Alarms - Bed alarm is set for fall risk patients  - Ensure chair alarm is in place and activated if patient is up in a chair   L  Lines - Check IV for any infiltration  - Boykin bag is empty if patient has a Boykin   - Tubing and IV bags are labeled   S  Safety   - Room is clean, patient is clean, and equipment is clean. - Hallways are clear from equipment besides carts. - Fall bracelet on for fall risk patients  - Ensure room is clear and free of clutter  - Suction is set up for ALL patients (with diann)  - Hallways are clear from equipment besides carts.    - Isolation precautions followed, supplies available outside room, sign posted

## 2019-08-21 NOTE — PROGRESS NOTES
Problem: Self Care Deficits Care Plan (Adult)  Goal: *Therapy Goal (Edit Goal, Insert Text)  Description  Occupational Therapy Goals   Long Term Goals  Initiated 2019 and to be accomplished within 4 week(s)  1. Pt will perform self-feeding with MI.  2. Pt will perform grooming with Set-up. 3. Pt will perform UB bathing with SBA. 4. Pt will perform LB bathing with Luba/CGA. 5. Pt will perform tub/shower transfer with Luba/CGA using LRAD. 6. Pt will perform UB dressing with Set-up. 7. Pt will perform LB dressing with Luba/CGA. 8. Pt will perform toileting task with Luba/CGA. 9. Pt will perform toilet transfer with Luba/CGA using LRAD. Short Term Goals   Initiated 2019 and to be accomplished within 7 day(s) (2019)  1. Pt will perform self-feeding with S; using compensatory strategies for right hand incorporation into set-up. 2. Pt will perform simple grooming task, including denture mgmt, with Luba and compensatory strategies as needed. 3. Pt will perform UB bathing with Luba using AE as needed. 4. Pt will perform LB bathing with ModA using AE and compensatory strategies as needed. 5. Pt will perform tub/shower transfer with modA x 1 <>TTB. 6. Pt will perform UB dressing with CGA. 7. Pt will perform LB dressing with ModA using AE as needed. 8. Pt will perform toileting task with ModA. 9. Pt will perform toilet transfer with ModA x 1 <>3:1 commode. Outcome: Progressing Towards Goal   OCCUPATIONAL THERAPY TREATMENT    Patient: Savi Carrasco   71 y.o. Patient identified with name and : Yes     Date: 2019    First Tx Session  Time In: 0930  Time Out: 1100    Diagnosis: Acute ischemic stroke [facility]) [I63.9]   Precautions: Aspiration, NWB  Chart, occupational therapy assessment, plan of care, and goals were reviewed. Pain:  Pt reports 0/10 pain or discomfort prior to treatment. Pt reports 0/10 pain or discomfort post treatment.    Intervention Provided: N/A SUBJECTIVE:   Patient stated I want to do my best so I can get out of here. 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 Pt initially requesting therapist to wait on providing LB AE until his wife is present; educated pt on utilizing for dressing tasks and education can be provided to wife in future with pt agreeing. He reported looking forward to getting in the shower today. Good safety carryover noted after min education on ways to maintain safety in shower. Towel placed on TTB with wash clothes under each feet to decrease slipping risk. Grooming not completed today as pt reported his wife assists with denture care and shaving. Education provided on pt completing B UE therex HEP twice daily, however to ensure pt does 10 reps per session and gives himself rest breaks; pt verbalized understanding. Therapeutic Exercise  Daily Assessment   AAROM right UE to increase neuroplasticity/ROM in prep for functional use/ADLs  Seated in w/c, pt participated in AAROM of right UE x10 reps shoulder flexion/extension. Noted tricep activation during flexion. Pt seated in front of mirror for increased visual cue, completed x10 reps B shoulder elevation/depression with min cues for proper form. UPPER BODY BATHING Daily Assessment    Upper Body Bathing  Bathing Assistance, Upper: 4 (Minimal assistance)  Upper Body : Training to use affected extremity as a gross motor assistance  Position Performed: Seated in chair  Comments: Min A for washing right UE and left axillary. Assist for weight of right UE for pt to wash right axillary.       LOWER BODY BATHING Daily Assessment    Lower Body Bathing  Bathing Assistance, Lower : 3 (Moderate assistance )  Adaptive Equipment: Grab bar;Long handled sponge  Position Performed: Seated in chair;Bending forward method  Comments: Seated on tub bench, pt able to wash periarea and thighs without assist. Utilized LH sponge for B LEs/feet with Min A for bottom of feet. Pt leaned far forward for Mod A to wash buttocks utilizing grab bar/bench as needed; he was able to wash left butt cheek     TOILETING Daily Assessment    Toileting  Toileting Assistance (FIM Score): 6 (Modified independent)  Adaptive Equipment: Other (comment)(hand held urinal )     UPPER BODY DRESSING Daily Assessment    Upper Body Dressing   Dressing Assistance : 5 (Supervision)(setup )  Comments: Setup to cecily over right UE first with min cues for pt to cecily up to shoulder prior to donning over left UE/overhead      LOWER BODY DRESSING Daily Assessment    Lower Body Dressing   Dressing Assistance : 3 (Moderate assistance)  Leg Crossed Method Used: No  Position Performed: Seated in chair;Standing;Bending forward method  Adaptive Equipment Used: Reacher;Sock aid; Long handled shoe horn  Comments: Pt doffed shorts from hips seated on TTB with Min A off B LEs. He utilized reacher to [email or web address] B socks. Pt given Min A with reacher to cecily pants over B feet and up LEs. Mod A for standing with pt able to assist donning pants over hips given Mod A for right side only. Mod A for use of karthik sock aid d/t pt with large tube socks. Elastic shoe strings changed for pt; Min A to cecily right shoe only with pt using LH shoe horn. MOBILITY/TRANSFERS Daily Assessment    Functional Transfers  Tub or Shower Type: Shower  Amount of Assistance Required: 3 (Moderate assistance)(w/c <> TTB )  Adaptive Equipment: Tub transfer bench       ASSESSMENT:  Pt with good safety in shower today. He responded well to therapist instructions on ways to complete transfer and maintain safety throughout ADL tasks. Therapist provided instruction prior to and during transfer/task to increase carryover/safety. He remains motivated towards recovery. Noted right UE tricep activation during shoulder flexion with AAROM.    Progression toward goals:  ?          Improving appropriately and progressing toward goals  ? Improving slowly and progressing toward goals  ? Not making progress toward goals and plan of care will be adjusted     PLAN:  Patient continues to benefit from skilled intervention to address the above impairments. Continue treatment per established plan of care. Discharge Recommendations:  Home Health with 24/7 Assistance vs SNF   Further Equipment Recommendations for Discharge:  Bariatric 3:1 commode, TTB       Activity Tolerance:  Fair; pt does not initiate rest breaks requires mod cues for rest break    Estimated LOS: 8/30/2019    Please refer to the flowsheet for vital signs taken during this treatment. After treatment:   ?  Patient left in no apparent distress sitting up in chair   ? Patient left in no apparent distress in bed  ? Call bell left within reach  ? Nursing notified  ? Caregiver present  ? Bed alarm activated    COMMUNICATION/EDUCATION:   ? Home safety education was provided and the patient/caregiver indicated understanding. ? Patient/family have participated as able in goal setting and plan of care. ? Patient/family agree to work toward stated goals and plan of care. ? Patient understands intent and goals of therapy, but is neutral about his/her participation. ? Patient is unable to participate in goal setting and plan of care.       JUSTIN Ramos/ROVERTO

## 2019-08-21 NOTE — PROGRESS NOTES
Problem: Mobility Impaired (Adult and Pediatric)  Goal: *Acute Goals and Plan of Care (Insert Text)  Description  Physical Therapy Short Term Goals  Initiated 2019 and to be accomplished within 14 day(s)  1. Patient will move from supine to sit and sit to supine  in bed with minimal assistance/contact guard assist.     2.  Patient will transfer from bed to chair and chair to bed with minimal/moderate assistance using the least restrictive device. 3.  Patient will perform sit to stand with minimal assistance/moderate assistance. 4.  Patient will ambulate with moderate assistance  for 10 feet with the least restrictive device. Physical Therapy Goals  Initiated 2019 and to be accomplished within 4 weeks  1. Patient will move from supine to sit and sit to supine  in bed with supervision/set-up. 2.  Patient will transfer from bed to chair and chair to bed with supervision/set-up using the least restrictive device. 3.  Patient will perform sit to stand with supervision/set-up. 4.  Patient will ambulate with minimal assistance for 50 feet with the least restrictive device. 5.  Patient will ascend/descend 2 stairs with 1 handrail(s) with moderate assistance . Outcome: Progressing Towards Goal   PHYSICAL THERAPY TREATMENT    Patient: Claudia Barnard (63 y.o. male)  Date: 2019  Diagnosis: Acute ischemic stroke [facility]) [I63.9] Acute ischemic stroke [facility])  Precautions: Aspiration, NWB  Chart, physical therapy assessment, plan of care and goals were reviewed. Time In: 1400  Time Out: 1505  Patient Seen For: Balance activities;Gait training; Therapeutic exercise;Transfer training  Pain:  Pt pain was reported as  0 pre-treatment. Pt pain was reported as 0 post-treatment. Intervention: n/a    Patient identified with name and :yes    SUBJECTIVE:     \"I just want to do this. \"    OBJECTIVE DATA SUMMARY:   Objective:     GROSS ASSESSMENT Daily Assessment            BED/MAT MOBILITY Daily Assessment    Supine to Sit : 4 (Minimal assistance)  Sit to Supine : 5 (Supervision)       TRANSFERS Daily Assessment    Other: stand step without device  Transfer Assistance : 4 (Minimal assistance)  Sit to Stand Assistance: Minimal assistance(from higher surface, mod A from w/c due to height)  Tactile cues on right LE for quad and glute activation during sit to stand and to stabilize right LE when stepping with left LE during transfer. GAIT Daily Assessment    Amount of Assistance: 3 (Moderate assistance)  Distance (ft): 6 Feet (ft)  Assistive Device: Cane, quad  Initially required min A for balance and tactile cues at right LE to contract at quad and glute to stabilize. Patient was able to progress right LE independently for first 3 steps however as he fatigued attempted to compensate by flexing forward at his trunk resulting in mod A to maintain balance. Pt requires cues to slow down and ensure right LE is ready to bear full weight prior to stepping with his left LE.       STEPS or STAIRS Daily Assessment            BALANCE Daily Assessment    Sitting - Static: Good (unsupported)  Sitting - Dynamic: Fair (occasional)  Standing - Static: (fair-/poor+)  Standing - Dynamic : Impaired       WHEELCHAIR MOBILITY Daily Assessment            THERAPEUTIC EXERCISES Daily Assessment    Extremity: Right  Exercise Type #1: Supine lower extremity strengthening  Sets Performed: 2  Reps Performed: 10  Level of Assist: Minimal assistance       Neuro Re-Education:  Standing with emphasis on maintaining upright trunk and right LE straight, without hyperextending at knee, and raising left LE several inches off the floor. Pt progressed from mod A to min A/CGA to maintain balance and initiate appropriate muscle contractions for stabilization. ASSESSMENT:  Pt is progressing well and continues to demonstrate increased ability to perform isolated muscle contractions in right LE.   Progression toward goals:  ?      Improving appropriately and progressing toward goals  ? Improving slowly and progressing toward goals  ? Not making progress toward goals and plan of care will be adjusted     PLAN:  Patient continues to benefit from skilled intervention to address the above impairments. Continue treatment per established plan of care. Discharge Recommendations:  [address] [facility] versus home health pending progress  Further Equipment Recommendations for Discharge:  N/A     Estimated LOS: 4 weeks    Activity Tolerance:   fair  Please refer to the flowsheet for vital signs taken during this treatment.     After treatment:   Patient left sitting in w/c with wife returning to his room       Ludwin Tovar, PT  8/21/2019

## 2019-08-21 NOTE — ROUTINE PROCESS
SHIFT CHANGE NOTE FOR [facility]    Bedside and Verbal shift change report given to Sameer Cedeno, RN (oncoming nurse) by Cesar Zamora RN   (offgoing nurse). Report included the following information SBAR, Kardex, MAR and Recent Results.     Situation:   Code Status: Full Code   Reason for Admission: CVA  Hospital Day: 6   Problem List:   Hospital Problems  Date Reviewed: 8/20/2019          Codes Class Noted POA    Vitamin D deficiency (Chronic) ICD-10-CM: E55.9  ICD-9-CM: 268.9  8/19/2019 Yes    Overview Signed 8/19/2019  1:34 PM by Chris Alfred MD     Vitamin D 25-Hydroxy (8/19/2019) = 16.2              Leukocytosis ICD-10-CM: G72.384  ICD-9-CM: 288.60  8/16/2019 Yes    Overview Signed 8/16/2019 11:19 AM by Chris Alfred MD     WBC count (8/16/2019) = 17.9             Hypertensive heart and kidney disease without heart failure and with stage 2 chronic kidney disease (Chronic) ICD-10-CM: I13.10, N18.2  ICD-9-CM: 404.90, 585.2  Unknown Yes        Chronic hypokalemia ICD-10-CM: E87.6  ICD-9-CM: 276.8  Unknown Yes    Overview Addendum 8/15/2019 11:31 AM by Chris Alfred MD     Persistent chronic hypokalemia + hypertension; ?primary hyperaldosteronism             Type 2 diabetes mellitus with stage 2 chronic kidney disease (HCC) (Chronic) ICD-10-CM: E11.22, N18.2  ICD-9-CM: 250.40, 585.2  Unknown Yes    Overview Signed 8/15/2019 12:26 PM by Chris Alfred MD     HbA1c (8/13/2019) = 6.6             Pure hypercholesterolemia (Chronic) ICD-10-CM: E78.00  ICD-9-CM: 272.0  8/15/2019 Yes    Overview Addendum 8/15/2019  6:27 PM by Chris Alfred MD     Lipid profile (8/13/2019) showed , , HDL 42, ; Lipid profile (8/14/2019) showed , , HDL 39, LDL 94             Current use of aspirin ICD-10-CM: Z79.82  ICD-9-CM: V58.66  8/14/2019 Yes        On statin therapy due to risk of future cardiovascular event ICD-10-CM: Z79.899  ICD-9-CM: V58.69  8/14/2019 Yes    Overview Signed 8/15/2019 12:21 PM by Norma Gleason MD     On Atorvastatin             Vitamin B12 deficiency anemia (Chronic) ICD-10-CM: D51.9  ICD-9-CM: 281.1  8/14/2019 Yes    Overview Signed 8/19/2019  1:24 PM by Norma Gleason MD     Vitamin B12 (8/14/2019) = 208             Iron deficiency anemia (Chronic) ICD-10-CM: D50.9  ICD-9-CM: 280.9  8/14/2019 Yes    Overview Signed 8/19/2019  1:25 PM by Norma Gleason MD     Anemia work-up (8/14/2019) showed serum iron 22, TIBC 371, iron % saturation 6, ferritin 34, reticulocyte count 1.4               Refusal of statin medication by patient ICD-10-CM: Z53.29  ICD-9-CM: V64.2  8/13/2019 Yes    Overview Signed 8/15/2019  2:01 PM by Norma Gleason MD     As per note of Neurology (Dr. Endy Forbes), patient refuses statin agent because of potential side effects.               * (Principal) [facility] ([facility] ICD-10-CM: I63.9  ICD-9-CM: 434.91  8/12/2019 Yes    Overview Signed 8/15/2019 12:17 AM by Norma Gleason MD     Acute Ischemic Stroke (acute/early subacute infarct at the left posterior basal ganglia to corona radiata) with residual right hemiparesis, dysphagia and dysarthria             Impaired mobility and ADLs ICD-10-CM: Z74.09  ICD-9-CM: 799.89  8/12/2019 Yes        Received intravenous tissue plasminogen activator (tPA) in emergency department ICD-10-CM: Z92.82  ICD-9-CM: V45.88  8/12/2019 Yes        Right hemiparesis ([facility] ICD-10-CM: G81.91  ICD-9-CM: 342.90  8/12/2019 Yes        Dysphagia ICD-10-CM: R13.10  ICD-9-CM: 787.20  8/12/2019 Yes        Dysarthria ICD-10-CM: R47.1  ICD-9-CM: 784.51  8/12/2019 Yes              Background:   Past Medical History:   Past Medical History:   Diagnosis Date    Acute ischemic stroke ([facility] 8/12/2019    Acute Ischemic Stroke (acute/early subacute infarct at the left posterior basal ganglia to corona radiata) with residual right hemiparesis, dysphagia and dysarthria    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     On Allopurinol    Chronic hypokalemia     Persistent chronic hypokalemia + hypertension; ?primary hyperaldosteronism    CKD (chronic kidney disease) stage 2, GFR 60-89 ml/min 8/15/2019    Current use of aspirin 8/14/2019    Dysarthria 8/12/2019    Dysphagia 8/12/2019    Elevated prostate specific antigen (PSA) 7/21/2011    First degree atrioventricular block by electrocardiogram 8/12/2019    Hypertensive heart and kidney disease without heart failure and with stage 2 chronic kidney disease     Iron deficiency anemia 8/14/2019    Anemia work-up (8/14/2019) showed serum iron 22, TIBC 371, iron % saturation 6, ferritin 34, reticulocyte count 1.4     Obesity, Class I, BMI 30-34.9     Obstructive sleep apnea on CPAP     On statin therapy due to risk of future cardiovascular event 8/14/2019    On Atorvastatin    Pure hypercholesterolemia 08/15/2019    Lipid profile (8/13/2019) showed , , HDL 42, ; Lipid profile (8/14/2019) showed , , HDL 39, LDL 94    Received intravenous tissue plasminogen activator (tPA) in emergency department 8/12/2019    Refusal of statin medication by patient 8/13/2019    As per note of Neurology (Dr. Char Conrad), patient refuses statin agent because of potential side effects.  Right hemiparesis ([address] 8/12/2019    Type 2 diabetes mellitus with stage 2 chronic kidney disease (HCC)     HbA1c (8/13/2019) = 6.6    Vitamin B12 deficiency anemia 8/14/2019    Vitamin B12 (8/14/2019) = 208    Vitamin D deficiency 8/19/2019    Vitamin D 25-Hydroxy (8/19/2019) = 16.2       Patient taking anticoagulants yes Heparin, ASA   Patient has a defibrillator: no     Assessment:   Changes in Assessment throughout shift: No     Patient has central line: no Reasons if yes: Insertion date: Last dressing date:   Patient has Boykin Cath: no Reasons if yes:     Insertion date:     Last Vitals:     Vitals:    08/20/19 0714 08/20/19 1352 08/20/19 1600 08/20/19 2159   BP: 137/69 144/73 139/76 179/74   Pulse: (!) 110 75 72 83   Resp: 18  18 18   Temp: 99.2 °F (37.3 °C)  97.1 °F (36.2 °C) 97 °F (36.1 °C)   SpO2: 95%  97% 96%   Weight:       Height:            PAIN    Pain Assessment    Pain Intensity 1: 0 (08/21/19 0400) Pain Intensity 1: 2 (12/29/14 1105)    Pain Location 1: Shoulder Pain Location 1: Abdomen    Pain Intervention(s) 1: Medication (see MAR) Pain Intervention(s) 1: Medication (see MAR)  Patient Stated Pain Goal: 0 Patient Stated Pain Goal: 0  o Intervention effective: no    o Other actions taken for pain:      Skin Assessment  Skin color Skin Color: Appropriate for ethnicity  Condition/Temperature Skin Condition/Temp: Dry, Warm  Integrity Skin Integrity: Tear  Turgor Turgor: Non-tenting  Weekly Pressure Ulcer Documentation  Pressure  Injury Documentation: No Pressure Injury Noted-Pressure Ulcer Prevention Initiated  Wound Prevention & Protection Methods  Orientation of wound Orientation of Wound Prevention: Posterior  Location of Prevention Location of Wound Prevention: Buttocks, Sacrum/Coccyx  Dressing Present Dressing Present : Yes  Dressing Status Dressing Status: Intact  Wound Offloading Wound Offloading (Prevention Methods): Bed, pressure redistribution/air     INTAKE/OUPUT  Date 08/20/19 0700 - 08/21/19 0659 08/21/19 0700 - 08/22/19 0659   Shift 8756-2604 1389-7324 24 Hour Total 3257-8403 8538-1557 24 Hour Total   INTAKE   P.O. 1620  1620        P. O. 1620  1620      Shift Total(mL/kg) 1620(15.9)  1620(15.9)      OUTPUT   Urine(mL/kg/hr) 150(0.1)  150        Urine Voided 150  150        Urine Occurrence(s) 8 x  8 x      Emesis/NG output           Emesis Occurrence(s) 0 x  0 x      Stool           Stool Occurrence(s) 2 x  2 x      Shift Total(mL/kg) 150(1.5)  150(1.5)      NET 1470  1470      Weight (kg) 102 102 102 102 102 102       Recommendations:  1. Patient needs and requests:     2. Diet: Cardiac Mech Soft Nectar Thick     3. Pending tests/procedures:      4.  Functional Level/Equipment:     5. Estimated Discharge Date: 8/24/19 Posted on Whiteboard in Patients Room: yes     HEALS Safety Check    A safety check occurred in the patient's room between off going nurse and oncoming nurse listed above. The safety check included the below items  Area Items   H  High Alert Medications - Verify all high alert medication drips (heparin, PCA, etc.)   E  Equipment - Suction is set up for ALL patients (with diann)  - Red plugs utilized for all equipment (IV pumps, etc.)  - WOWs wiped down at end of shift.  - Room stocked with oxygen, suction, and other unit-specific supplies   A  Alarms - Bed alarm is set for fall risk patients  - Ensure chair alarm is in place and activated if patient is up in a chair   L  Lines - Check IV for any infiltration  - Boykin bag is empty if patient has a Boykin   - Tubing and IV bags are labeled   S  Safety   - Room is clean, patient is clean, and equipment is clean. - Hallways are clear from equipment besides carts. - Fall bracelet on for fall risk patients  - Ensure room is clear and free of clutter  - Suction is set up for ALL patients (with diann)  - Hallways are clear from equipment besides carts.    - Isolation precautions followed, supplies available outside room, sign posted

## 2019-08-22 ENCOUNTER — APPOINTMENT (OUTPATIENT)
Dept: GENERAL RADIOLOGY | Age: 64
DRG: 057 | End: 2019-08-22
Attending: INTERNAL MEDICINE
Payer: MEDICARE

## 2019-08-22 LAB
ANION GAP SERPL CALC-SCNC: 8 MMOL/L (ref 3–18)
BUN SERPL-MCNC: 35 MG/DL (ref 7–18)
BUN/CREAT SERPL: 26 (ref 12–20)
CALCIUM SERPL-MCNC: 8.8 MG/DL (ref 8.5–10.1)
CHLORIDE SERPL-SCNC: 108 MMOL/L (ref 100–111)
CO2 SERPL-SCNC: 25 MMOL/L (ref 21–32)
CREAT SERPL-MCNC: 1.33 MG/DL (ref 0.6–1.3)
GLUCOSE BLD STRIP.AUTO-MCNC: 119 MG/DL (ref 70–110)
GLUCOSE BLD STRIP.AUTO-MCNC: 140 MG/DL (ref 70–110)
GLUCOSE BLD STRIP.AUTO-MCNC: 140 MG/DL (ref 70–110)
GLUCOSE BLD STRIP.AUTO-MCNC: 149 MG/DL (ref 70–110)
GLUCOSE SERPL-MCNC: 94 MG/DL (ref 74–99)
HCT VFR BLD AUTO: 26.3 % (ref 36–48)
HGB BLD-MCNC: 8.6 G/DL (ref 13–16)
MAGNESIUM SERPL-MCNC: 2.2 MG/DL (ref 1.6–2.6)
PLATELET # BLD AUTO: 315 K/UL (ref 135–420)
POTASSIUM SERPL-SCNC: 3.6 MMOL/L (ref 3.5–5.5)
SODIUM SERPL-SCNC: 141 MMOL/L (ref 136–145)

## 2019-08-22 PROCEDURE — 65310000000 HC RM PRIVATE REHAB

## 2019-08-22 PROCEDURE — 92526 ORAL FUNCTION THERAPY: CPT

## 2019-08-22 PROCEDURE — BQ0GZZZ PLAIN RADIOGRAPHY OF RIGHT ANKLE: ICD-10-PCS | Performed by: INTERNAL MEDICINE

## 2019-08-22 PROCEDURE — 74011250637 HC RX REV CODE- 250/637: Performed by: INTERNAL MEDICINE

## 2019-08-22 PROCEDURE — 85018 HEMOGLOBIN: CPT

## 2019-08-22 PROCEDURE — 82962 GLUCOSE BLOOD TEST: CPT

## 2019-08-22 PROCEDURE — 85049 AUTOMATED PLATELET COUNT: CPT

## 2019-08-22 PROCEDURE — 36415 COLL VENOUS BLD VENIPUNCTURE: CPT

## 2019-08-22 PROCEDURE — 97112 NEUROMUSCULAR REEDUCATION: CPT

## 2019-08-22 PROCEDURE — 73600 X-RAY EXAM OF ANKLE: CPT

## 2019-08-22 PROCEDURE — 97110 THERAPEUTIC EXERCISES: CPT

## 2019-08-22 PROCEDURE — 83735 ASSAY OF MAGNESIUM: CPT

## 2019-08-22 PROCEDURE — 80048 BASIC METABOLIC PNL TOTAL CA: CPT

## 2019-08-22 PROCEDURE — 92507 TX SP LANG VOICE COMM INDIV: CPT

## 2019-08-22 PROCEDURE — 97116 GAIT TRAINING THERAPY: CPT

## 2019-08-22 PROCEDURE — 97530 THERAPEUTIC ACTIVITIES: CPT

## 2019-08-22 PROCEDURE — 74011250636 HC RX REV CODE- 250/636: Performed by: INTERNAL MEDICINE

## 2019-08-22 PROCEDURE — 84466 ASSAY OF TRANSFERRIN: CPT

## 2019-08-22 RX ORDER — SPIRONOLACTONE 25 MG/1
50 TABLET ORAL 2 TIMES DAILY
Status: DISCONTINUED | OUTPATIENT
Start: 2019-08-22 | End: 2019-09-05 | Stop reason: HOSPADM

## 2019-08-22 RX ORDER — LABETALOL 200 MG/1
600 TABLET, FILM COATED ORAL EVERY 12 HOURS
Status: DISCONTINUED | OUTPATIENT
Start: 2019-08-22 | End: 2019-08-29

## 2019-08-22 RX ORDER — BACLOFEN 10 MG/1
5 TABLET ORAL 3 TIMES DAILY
Status: DISCONTINUED | OUTPATIENT
Start: 2019-08-22 | End: 2019-08-26

## 2019-08-22 RX ADMIN — EPOETIN ALFA-EPBX 10000 UNITS: 10000 INJECTION, SOLUTION INTRAVENOUS; SUBCUTANEOUS at 13:35

## 2019-08-22 RX ADMIN — ATORVASTATIN CALCIUM 80 MG: 40 TABLET, FILM COATED ORAL at 21:36

## 2019-08-22 RX ADMIN — ISOSORBIDE DINITRATE 30 MG: 20 TABLET ORAL at 06:21

## 2019-08-22 RX ADMIN — BACLOFEN 5 MG: 10 TABLET ORAL at 19:08

## 2019-08-22 RX ADMIN — HEPARIN SODIUM 5000 UNITS: 5000 INJECTION INTRAVENOUS; SUBCUTANEOUS at 21:36

## 2019-08-22 RX ADMIN — HYDRALAZINE HYDROCHLORIDE 75 MG: 50 TABLET, FILM COATED ORAL at 06:21

## 2019-08-22 RX ADMIN — Medication 400 MG: at 09:12

## 2019-08-22 RX ADMIN — LABETALOL HYDROCHLORIDE 400 MG: 100 TABLET, FILM COATED ORAL at 09:12

## 2019-08-22 RX ADMIN — ISOSORBIDE DINITRATE 30 MG: 20 TABLET ORAL at 13:26

## 2019-08-22 RX ADMIN — CYANOCOBALAMIN TAB 1000 MCG 1000 MCG: 1000 TAB at 09:11

## 2019-08-22 RX ADMIN — ASPIRIN 81 MG 81 MG: 81 TABLET ORAL at 09:11

## 2019-08-22 RX ADMIN — HEPARIN SODIUM 5000 UNITS: 5000 INJECTION INTRAVENOUS; SUBCUTANEOUS at 06:26

## 2019-08-22 RX ADMIN — AMLODIPINE BESYLATE 10 MG: 10 TABLET ORAL at 09:11

## 2019-08-22 RX ADMIN — FERROUS SULFATE TAB 325 MG (65 MG ELEMENTAL FE) 325 MG: 325 (65 FE) TAB at 09:11

## 2019-08-22 RX ADMIN — ISOSORBIDE DINITRATE 30 MG: 20 TABLET ORAL at 21:36

## 2019-08-22 RX ADMIN — LABETALOL HCL 600 MG: 200 TABLET, FILM COATED ORAL at 21:36

## 2019-08-22 RX ADMIN — SPIRONOLACTONE 50 MG: 25 TABLET ORAL at 21:36

## 2019-08-22 RX ADMIN — Medication 5000 UNITS: at 09:12

## 2019-08-22 RX ADMIN — HEPARIN SODIUM 5000 UNITS: 5000 INJECTION INTRAVENOUS; SUBCUTANEOUS at 13:25

## 2019-08-22 RX ADMIN — Medication 250 MG: at 09:12

## 2019-08-22 RX ADMIN — HYDRALAZINE HYDROCHLORIDE 75 MG: 50 TABLET, FILM COATED ORAL at 13:25

## 2019-08-22 RX ADMIN — HYDRALAZINE HYDROCHLORIDE 75 MG: 50 TABLET, FILM COATED ORAL at 21:36

## 2019-08-22 NOTE — PROGRESS NOTES
Problem: Self Care Deficits Care Plan (Adult)  Goal: *Therapy Goal (Edit Goal, Insert Text)  Description  Occupational Therapy Goals   Long Term Goals  Initiated 2019 and to be accomplished within 4 week(s)  1. Pt will perform self-feeding with MI.  2. Pt will perform grooming with Set-up. 3. Pt will perform UB bathing with SBA. 4. Pt will perform LB bathing with Luba/CGA. 5. Pt will perform tub/shower transfer with Luba/CGA using LRAD. 6. Pt will perform UB dressing with Set-up. 7. Pt will perform LB dressing with Luba/CGA. 8. Pt will perform toileting task with Luba/CGA. 9. Pt will perform toilet transfer with Luba/CGA using LRAD. Short Term Goals   Initiated 2019 and to be accomplished within 7 day(s) (2019)  1. Pt will perform self-feeding with S; using compensatory strategies for right hand incorporation into set-up. 2. Pt will perform simple grooming task, including denture mgmt, with Luba and compensatory strategies as needed. 3. Pt will perform UB bathing with Luba using AE as needed. 4. Pt will perform LB bathing with ModA using AE and compensatory strategies as needed. 5. Pt will perform tub/shower transfer with modA x 1 <>TTB. 6. Pt will perform UB dressing with CGA. 7. Pt will perform LB dressing with ModA using AE as needed. 8. Pt will perform toileting task with ModA. 9. Pt will perform toilet transfer with ModA x 1 <>3:1 commode. Outcome: Progressing Towards Goal   OCCUPATIONAL THERAPY TREATMENT    Patient: Michael Antunez   71 y.o. Patient identified with name and : yes    Date: 2019    First Tx Session  Time In: 0800  Time Out[de-identified] 0900    Second Tx Session  Time In: 56  Time Out[de-identified] 1100    Diagnosis: Acute ischemic stroke [facility]) [I63.9]   Precautions: Aspiration  Chart, occupational therapy assessment, plan of care, and goals were reviewed. Pain:  Pt reports 0/10 pain or discomfort prior to treatment.     Pt reports 0/10 pain or discomfort post treatment. Intervention Provided: NA      SUBJECTIVE:   Patient stated I feel like I did worse than I did yesterday.  Pt educated on stroke recovery, that progress in not always linear, but assuring pt that he has been working hard each day as well as reminding pt of progress he has made in short length of ARU stay. Pt receptive to education and appearing less discouraged at end of conversation. OBJECTIVE DATA SUMMARY:   Pt approached for tx seated in w/c in room, pt agreeable. OT noting pt continuing to demo pitting edema in right lateral ankle with physician, nursing, and PT notified of continued finding. Pt performing w/c<>EOM transfer with modA and cues for forward flexion during initial sit>stand. Upon sitting EOM, pt presenting with increased muscular fasciculations in proximal UE (lats, pec, rotator cuff) with pt presenting with increased tone/tightness and resting position of adducted flexed position. Pt ended 1st session seated in dining room for breakfast.   Pt reapproached for tx with pt present in gym. Pt performing functional e-stim task. THERAPEUTIC ACTIVITY Daily Assessment   1st session:    Sidelying on mat, performed PROM to the right scapula in all planes with initial tightness noted during protraction with increased movement and mobility with repetitions. Performed PROM in prep for functional NMRE to the right UE. Sitting EOM, pt presenting with elevated right shoulder with increased tightness noted at upper trap/levator scapula. Pt provided with low load prolonged stretch for shoulder depression x 3 minutes overall and gentle massage over upper trap muscle belly to encourage decreased tightness. Supine on mat, pt on half foam mat, with UEs placed in cactus position to encourage pectoralis major stretch 3 sets of 1 min holds. Right UE placed on pillow to ensure good jt integrity and safety.      Pt educated while sitting unsupported to ensure good postural control to increase symmetry with shoulders 2/2 to pt presenting with minute left lateral lean and uneven shoulders. Pt demo'ed increased symmetry and shoulder height with cue for erect trunk posture. NMRE Daily Assessment   1st session:                   2nd session:  Performed right shldr isometrics with pt seated EOM with full length mirror provided for visual feedback. Performed 2 x 10 shldr flexion/adduction/abduction/extension with OT (A) at wrist jt and having pt \"punch\" into pillow. Pt demo'ing contraction for all movements with decreased motor initiation for shldr abduction. Performed functional e-stim targeting forearm flexors at recommended settings x 10 minutes at L20. Pt provided with cup to hold on to with right hand, and instructed during \"on\" times for e-stim to visualize and assist grasp for holding on to cup against gravity. Pt performed functional UE motion with bringing cup to mouth to simulate drinking during \"on\" times as well as holding on to cup when OT raising limb up from table to challenge pt's flexors. FEEDING/EATING Daily Assessment   1st session:   Pt set up for eating breakfast in dining room, requiring set-up of beverage containers w/ demo'ing (I) use of utensils for food<>mouth. ASSESSMENT:  Pt demo'ing increased symmetry and posture with stretch to pecs and upper trap region 2/2 to pt presenting with elevated right shoulder and tightness. Physician notified of increased fasciculations and tone noted in session. Cont progressing in functional transfers and ADL (I). Progression toward goals:  ?          Improving appropriately and progressing toward goals  X          Improving slowly and progressing toward goals  ? Not making progress toward goals and plan of care will be adjusted     PLAN:  Patient continues to benefit from skilled intervention to address the above impairments. Continue treatment per established plan of care.   Discharge Recommendations:  Skilled Nursing Facility  Further Equipment Recommendations for Discharge:  Bariatric 3:1 and TTB     Activity Tolerance:  Good       Estimated LOS: 9/4/19    Please refer to the flowsheet for vital signs taken during this treatment. After treatment:   ?  Patient left in no apparent distress sitting up in chair with right leg rest elevated to maximal position to encourage decreased swelling of right ankle. ?  Call bell left within reach      COMMUNICATION/EDUCATION:   ? Home safety education was provided and the patient/caregiver indicated understanding. ? Patient/family have participated as able in goal setting and plan of care. ? Patient/family agree to work toward stated goals and plan of care. ? Patient understands intent and goals of therapy, but is neutral about his/her participation. ? Patient is unable to participate in goal setting and plan of care.       Link Eloise, OT

## 2019-08-22 NOTE — PROGRESS NOTES
Problem: Dysphagia (Adult)  Goal: *Speech Goal: (INSERT TEXT)  Description  Long term goals  Patient will:  1. Tolerate regular diet with thin liquids using safe swallowing techniques without s/s of aspiration in 5/6 meals. 2. Use safe swallowing techniques (including slowed rate, small bites/sips, alternate liquids/solids, effortful swallow, head rotation to right for liquids) with supervision. 3. Participate in MBS study prior to advancing to thin liquids to assure safety (no aspiration). 4. Perform oral and pharyngeal strengthening exercises (to improve speech and swallowing) with supervision-modified independence. 5. Demonstrate 90% intelligibility in conversation using appropriate speaking strategies (slowed rate, overarticulation, increased volume). 6. Recall 3 words after 5 minutes with supervision. 7. Perform functional problem solving/reasoning tasks with 90% accuracy. 8. Name 8-10 items in categories of increasing abstraction, supervision. Short term goals (by 8/23/19)  Patient will:   1. Tolerate soft diet with nectar thick liquids using safe swallowing techniques without s/s of aspiration in 5/6 meals. 2. Use safe swallowing techniques (including slowed rate, small bites/sips, alternate liquids/solids, effortful swallow, head rotation to right for liquids) with min cues. 3. Tolerate small amounts of ice chips with the SLP using head rotation to the right and/or chin tuck, without overt s/s of aspiration. 4. Perform oral and pharyngeal strengthening exercises (to improve speech and swallowing) with mod-min assist.  5. Demonstrate 90% intelligibility in single words of up to 4 syllables using appropriate speaking strategies (slowed rate, overarticulation, increased volume). 6. Recall 3 words after 5 minutes with mod assist.  7. Perform functional problem solving/reasoning tasks with 75-85% accuracy. 8. Name 8-10 items in concrete categories, supervision.      Note:   SPEECH LANGUAGE PATHOLOGY TREATMENT    Patient: Yola Rutherford (32 y.o. male)  Date: 8/22/2019  Diagnosis: Acute ischemic stroke [facility]) [I63.9] Acute ischemic stroke ([facility]       Time in: 0915 1000 1230  Time Out:  [id number] 1300  SUBJECTIVE:   Patient stated I'm doing OK. OBJECTIVE:   Mental Status:  Mr. Nirav Nick was awake and alert today. He was not as frustrated at mealtime as yesterday. Treatment & Interventions:   Patient was seen in the dining room at mealtime for breakfast and lunch. Patient continues to need consistent cuing to slow his rate of intake, take smaller bites/sips, and clear his mouth between boluses. At lunchtime, his wife assisted with cuing. Patient did not demonstrate overt s/s of aspiration at mealtime, but continues to be at significant risk and requires supervision with all meals. Mr. Nirav Nick was also seen for a speech therapy session in the SLP's office this morning. The following treatment tasks were presented: Motor Speech/Dysphagia:   Review of speaking strats: Min-mod cues  5 syllable words:  85% intelligibility  Words with medial \"ng\": Forceful productions 60% of the time  Sentence productions: 85% accuracy/intelligibility  Review of swallowing strats: Mod assist  Trials with ice chips:  No overt s/s of aspiration with head rotation to right  Trials with 5cc water:  Patient with eventual throat clearing and cough. Mr. Nirav Nick is very impulsive with self presentation. When cue to clear throat and swallow again, patient did not. Neuro-Linguistics:   Orientation:   Independent  Recent memory:  Independent for events    Response & Tolerance to Activities:  Mr. Nirav Nick continues to be quite impulsive with PO intake. He is not as frustrated by cuing for safe swallowing strategies, but continues to need mod-max cues for safety. Suspect significant pharyngeal weakness on the right (significant facial weakness on right disproportional to limb weakness).     Pain:  Pain Scale 1: Numeric (0 - 10)  No report of pain     After treatment:   ?       Patient left in no apparent distress sitting up in chair  ? Patient left in no apparent distress in bed  ? Call bell left within reach  ? Nursing notified  ? Caregiver present  ? Bed alarm activated    ASSESSMENT:   Progression toward goals:  ?       Improving appropriately and progressing toward goals  ? Improving slowly and progressing toward goals  ? Not making progress toward goals and plan of care will be adjusted    PLAN:   Patient continues to benefit from skilled intervention to address the above impairments. Continue treatment per established plan of care.   Discharge Recommendations:  Home Health    Estimated LOS: Through     SILVERIO Harmna  Time Calculation:  75 Minutes

## 2019-08-22 NOTE — ROUTINE PROCESS
SHIFT CHANGE NOTE FOR [facility]    Bedside and Verbal shift change report given to Henrietta Rosario RN (oncoming nurse) by Vivi Silverio RN   (offgoing nurse). Report included the following information SBAR, Kardex, MAR and Recent Results.     Situation:   Code Status: Full Code   Reason for Admission: CVA  Hospital Day: 7   Problem List:   Hospital Problems  Date Reviewed: 8/21/2019          Codes Class Noted POA    Vitamin D deficiency (Chronic) ICD-10-CM: E55.9  ICD-9-CM: 268.9  8/19/2019 Yes    Overview Signed 8/19/2019  1:34 PM by Lianet James MD     Vitamin D 25-Hydroxy (8/19/2019) = 16.2              Leukocytosis ICD-10-CM: Y49.059  ICD-9-CM: 288.60  8/16/2019 Yes    Overview Signed 8/16/2019 11:19 AM by Lianet James MD     WBC count (8/16/2019) = 17.9             Hypertensive heart and kidney disease without heart failure and with stage 2 chronic kidney disease (Chronic) ICD-10-CM: I13.10, N18.2  ICD-9-CM: 404.90, 585.2  Unknown Yes        Chronic hypokalemia ICD-10-CM: E87.6  ICD-9-CM: 276.8  Unknown Yes    Overview Addendum 8/15/2019 11:31 AM by Lianet James MD     Persistent chronic hypokalemia + hypertension; ?primary hyperaldosteronism             Type 2 diabetes mellitus with stage 2 chronic kidney disease (HCC) (Chronic) ICD-10-CM: E11.22, N18.2  ICD-9-CM: 250.40, 585.2  Unknown Yes    Overview Signed 8/15/2019 12:26 PM by Lianet James MD     HbA1c (8/13/2019) = 6.6             Pure hypercholesterolemia (Chronic) ICD-10-CM: E78.00  ICD-9-CM: 272.0  8/15/2019 Yes    Overview Addendum 8/15/2019  6:27 PM by Lianet James MD     Lipid profile (8/13/2019) showed , , HDL 42, ; Lipid profile (8/14/2019) showed , , HDL 39, LDL 94             Current use of aspirin ICD-10-CM: Z79.82  ICD-9-CM: V58.66  8/14/2019 Yes        On statin therapy due to risk of future cardiovascular event ICD-10-CM: Z79.899  ICD-9-CM: V58.69  8/14/2019 Yes    Overview Signed 8/15/2019 12:21 PM by Fuentes Frank MD     On Atorvastatin             Vitamin B12 deficiency anemia (Chronic) ICD-10-CM: D51.9  ICD-9-CM: 281.1  8/14/2019 Yes    Overview Signed 8/19/2019  1:24 PM by Fuentes Frank MD     Vitamin B12 (8/14/2019) = 208             Iron deficiency anemia (Chronic) ICD-10-CM: D50.9  ICD-9-CM: 280.9  8/14/2019 Yes    Overview Signed 8/19/2019  1:25 PM by Fuentes Frank MD     Anemia work-up (8/14/2019) showed serum iron 22, TIBC 371, iron % saturation 6, ferritin 34, reticulocyte count 1.4               Refusal of statin medication by patient ICD-10-CM: Z53.29  ICD-9-CM: V64.2  8/13/2019 Yes    Overview Signed 8/15/2019  2:01 PM by Fuentes Frank MD     As per note of Neurology (Dr. Luis Farah), patient refuses statin agent because of potential side effects.               * (Principal) Acute ischemic stroke ([facility] ICD-10-CM: I63.9  ICD-9-CM: 434.91  8/12/2019 Yes    Overview Signed 8/15/2019 12:17 AM by Fuentes Frank MD     Acute Ischemic Stroke (acute/early subacute infarct at the left posterior basal ganglia to corona radiata) with residual right hemiparesis, dysphagia and dysarthria             Impaired mobility and ADLs ICD-10-CM: Z74.09  ICD-9-CM: 799.89  8/12/2019 Yes        Received intravenous tissue plasminogen activator (tPA) in emergency department ICD-10-CM: Z92.82  ICD-9-CM: V45.88  8/12/2019 Yes        Right hemiparesis ([facility] ICD-10-CM: G81.91  ICD-9-CM: 342.90  8/12/2019 Yes        Dysphagia ICD-10-CM: R13.10  ICD-9-CM: 787.20  8/12/2019 Yes        Dysarthria ICD-10-CM: R47.1  ICD-9-CM: 784.51  8/12/2019 Yes              Background:   Past Medical History:   Past Medical History:   Diagnosis Date    Acute ischemic stroke ([facility] 8/12/2019    Acute Ischemic Stroke (acute/early subacute infarct at the left posterior basal ganglia to corona radiata) with residual right hemiparesis, dysphagia and dysarthria    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     On Allopurinol    Chronic hypokalemia     Persistent chronic hypokalemia + hypertension; ?primary hyperaldosteronism    CKD (chronic kidney disease) stage 2, GFR 60-89 ml/min 8/15/2019    Current use of aspirin 8/14/2019    Dysarthria 8/12/2019    Dysphagia 8/12/2019    Elevated prostate specific antigen (PSA) 7/21/2011    First degree atrioventricular block by electrocardiogram 8/12/2019    Hypertensive heart and kidney disease without heart failure and with stage 2 chronic kidney disease     Iron deficiency anemia 8/14/2019    Anemia work-up (8/14/2019) showed serum iron 22, TIBC 371, iron % saturation 6, ferritin 34, reticulocyte count 1.4     Obesity, Class I, BMI 30-34.9     Obstructive sleep apnea on CPAP     On statin therapy due to risk of future cardiovascular event 8/14/2019    On Atorvastatin    Pure hypercholesterolemia 08/15/2019    Lipid profile (8/13/2019) showed , , HDL 42, ; Lipid profile (8/14/2019) showed , , HDL 39, LDL 94    Received intravenous tissue plasminogen activator (tPA) in emergency department 8/12/2019    Refusal of statin medication by patient 8/13/2019    As per note of Neurology (Dr. Norman Bautista), patient refuses statin agent because of potential side effects.  Right hemiparesis ([address] 8/12/2019    Type 2 diabetes mellitus with stage 2 chronic kidney disease (HCC)     HbA1c (8/13/2019) = 6.6    Vitamin B12 deficiency anemia 8/14/2019    Vitamin B12 (8/14/2019) = 208    Vitamin D deficiency 8/19/2019    Vitamin D 25-Hydroxy (8/19/2019) = 16.2       Patient taking anticoagulants yes Heparin, ASA   Patient has a defibrillator: no     Assessment:   Changes in Assessment throughout shift: No     Patient has central line: no Reasons if yes: Insertion date: Last dressing date:   Patient has Bokyin Cath: no Reasons if yes:     Insertion date:     Last Vitals:     Vitals:    08/20/19 2159 08/21/19 0758 08/21/19 1330 08/21/19 2100   BP: 179/74 128/65 162/74 162/75   Pulse: 83 62 71 75   Resp: 18 18 18 18   Temp: 97 °F (36.1 °C) 98 °F (36.7 °C) 97.8 °F (36.6 °C) 98 °F (36.7 °C)   SpO2: 96% 98% 98% 97%   Weight:       Height:            PAIN    Pain Assessment    Pain Intensity 1: 0 (08/22/19 0430) Pain Intensity 1: 2 (12/29/14 1105)    Pain Location 1: Shoulder Pain Location 1: Abdomen    Pain Intervention(s) 1: Medication (see MAR) Pain Intervention(s) 1: Medication (see MAR)  Patient Stated Pain Goal: 0 Patient Stated Pain Goal: 0  o Intervention effective: no    o Other actions taken for pain:      Skin Assessment  Skin color Skin Color: Appropriate for ethnicity  Condition/Temperature Skin Condition/Temp: Dry  Integrity Skin Integrity: Wound (add Wound LDA), Tear  Turgor Turgor: Non-tenting  Weekly Pressure Ulcer Documentation  Pressure  Injury Documentation: No Pressure Injury Noted-Pressure Ulcer Prevention Initiated  Wound Prevention & Protection Methods  Orientation of wound Orientation of Wound Prevention: Posterior  Location of Prevention Location of Wound Prevention: Sacrum/Coccyx  Dressing Present Dressing Present : Yes  Dressing Status Dressing Status: Intact  Wound Offloading Wound Offloading (Prevention Methods): Bed, pressure redistribution/air     INTAKE/OUPUT  Date 08/21/19 0700 - 08/22/19 0659 08/22/19 0700 - 08/23/19 0659   Shift 6570-7832 9363-3044 24 Hour Total 1145-9569 6372-7190 24 Hour Total   INTAKE   P.O. 720  720        P. O. 720  720      Shift Total(mL/kg) 720(7.1)  720(7.1)      OUTPUT   Urine(mL/kg/hr)  300 300        Urine Voided  300 300        Urine Occurrence(s) 4 x 2 x 6 x      Stool           Stool Occurrence(s) 1 x 0 x 1 x      Shift Total(mL/kg)  300(2.9) 300(2.9)       -300 420      Weight (kg) 102 102 102 102 102 102       Recommendations:  1. Patient needs and requests:     2. Diet: Cardiac Mech Soft Nectar Thick     3. Pending tests/procedures:      4. Functional Level/Equipment:     5.  Estimated Discharge Date: 8/24/19 Posted on Whiteboard in [facility] Room: yes     HEALS Safety Check    A safety check occurred in the patient's room between off going nurse and oncoming nurse listed above. The safety check included the below items  Area Items   H  High Alert Medications - Verify all high alert medication drips (heparin, PCA, etc.)   E  Equipment - Suction is set up for ALL patients (with diann)  - Red plugs utilized for all equipment (IV pumps, etc.)  - WOWs wiped down at end of shift.  - Room stocked with oxygen, suction, and other unit-specific supplies   A  Alarms - Bed alarm is set for fall risk patients  - Ensure chair alarm is in place and activated if patient is up in a chair   L  Lines - Check IV for any infiltration  - Boykin bag is empty if patient has a Boykin   - Tubing and IV bags are labeled   S  Safety   - Room is clean, patient is clean, and equipment is clean. - Hallways are clear from equipment besides carts. - Fall bracelet on for fall risk patients  - Ensure room is clear and free of clutter  - Suction is set up for ALL patients (with diann)  - Hallways are clear from equipment besides carts.    - Isolation precautions followed, supplies available outside room, sign posted

## 2019-08-22 NOTE — PROGRESS NOTES
Problem: Mobility Impaired (Adult and Pediatric)  Goal: *Acute Goals and Plan of Care (Insert Text)  Description  Physical Therapy Short Term Goals  Initiated 2019 and to be accomplished within 14 day(s)  1. Patient will move from supine to sit and sit to supine  in bed with minimal assistance/contact guard assist.     2.  Patient will transfer from bed to chair and chair to bed with minimal/moderate assistance using the least restrictive device. 3.  Patient will perform sit to stand with minimal assistance/moderate assistance. 4.  Patient will ambulate with moderate assistance  for 10 feet with the least restrictive device. Physical Therapy Goals  Initiated 2019 and to be accomplished within 4 weeks  1. Patient will move from supine to sit and sit to supine  in bed with supervision/set-up. 2.  Patient will transfer from bed to chair and chair to bed with supervision/set-up using the least restrictive device. 3.  Patient will perform sit to stand with supervision/set-up. 4.  Patient will ambulate with minimal assistance for 50 feet with the least restrictive device. 5.  Patient will ascend/descend 2 stairs with 1 handrail(s) with moderate assistance . Outcome: Progressing Towards Goal    PHYSICAL THERAPY TREATMENT    Patient: Sarah Webb (86 y.o. male)  Date: 2019  Diagnosis: Acute ischemic stroke [facility]) [I63.9] Acute ischemic stroke [facility])  Precautions: Aspiration, NWB  Chart, physical therapy assessment, plan of care and goals were reviewed. Time In: 1200  Time out: 1230  Patient seen for: AM;Balance activities;Transfer training; Therapeutic exercise    Pain:  Pt pain was not reported pre-treatment. Pt pain was not reported post-treatment. Intervention: N/A    Patient identified with name and : yes    SUBJECTIVE:     Pt states \" I'll work hard. \"    OBJECTIVE DATA SUMMARY:   Objective:    BED/MAT MOBILITY Daily Assessment    Supine to Sit : 4 (Minimal assistance)(on mat table)  Sit to Supine : 5 (Supervision)     Pt performed supine<>sit on mat table demonstrating decreased awareness to R UE during transfer. TRANSFERS Daily Assessment    Other: Stand Step without device  Transfer Assistance : 4 (Minimal assistance)  Sit to Stand Assistance: Minimal assistance(Min A from EOM; Mod A from W/C)     Pt demos increased difficulty with transfers from W/C. Pt requires knee blocking to R LE for stability to achieve stand. BALANCE Daily Assessment    Sitting - Static: Good (unsupported)  Sitting - Dynamic: Fair (occasional)  Standing - Static: Fair;Occasional(-)  Standing - Dynamic : Impaired       THERAPEUTIC EXERCISES Daily Assessment    Extremity: Both  Exercise Type #1: Supine lower extremity strengthening  Sets Performed: 2  Reps Performed: 10  Level of Assist: Minimal assistance     Pt required min A to isolate pure motion/decrease compensatory strategies. physical Therapy TREATMENT    Patient: Yvonne Walton (84 y.o. male)  Date: 2019  Diagnosis: Acute ischemic stroke [facility]) [I63.9] Acute ischemic stroke [facility])  Precautions: Aspiration, NWB  Chart, physical therapy assessment, plan of care and goals were reviewed. Time In: 1330  Time Out: 1430  Patient Seen For: PM;Balance activities;Gait training;Patient education; Therapeutic exercise;Transfer training; Wheelchair mobility  Pain:  Pt pain was not  reported pre-treatment. Pt pain was not reported post-treatment. Intervention: N/A     Patient identified with name and : yes    SUBJECTIVE:     Pt states \"I'm tired. \"     OBJECTIVE DATA SUMMARY:   Objective:     BED/MAT MOBILITY Daily Assessment    Supine to Sit : 4 (Minimal assistance)  Sit to Supine : 4 (Minimal assistance)(increased asssitance with RLE secondary to fatigue)     Supine<>sit x3 trials for improved awareness of R UE during bed mobility. Pt utilized L LE to assist R LE with bed mobility this PM session.         TRANSFERS Daily Assessment    Other: Stand Step without device  Transfer Assistance : 4 (Minimal assistance)  Sit to Stand Assistance: Minimal assistance; Moderate assistance secondary to fatigue. Pt instructed sit<>stands 5 reps from EOB requiring min A. Pt required mod A for sit<>stands from W/C and EOM this PM secondary to fatigue. On final stand step transfer, pt unable to achieve full stand. GAIT Daily Assessment     Pre-gait activities:  Weight shifting  R<>L and A<>P  Stepping fwd/back with B LE 10 reps  Stepping laterally with BLE 5 reps    Pt slides R LE back to starting position. Pt mod/max A for balance for all pre-gait activities. Pt demos impulsivity secondary to intense motivation to return to PLOF. BALANCE Daily Assessment    Sitting - Static: Good (unsupported)  Sitting - Dynamic: Fair (occasional)  Standing - Static: Fair(-)  Standing - Dynamic : Impaired       WHEELCHAIR MOBILITY Daily Assessment    Able to Propel (ft): 100 feet(limited by fatigue)  Functional Level: 5  Curbs/Ramps Assist Required (FIM Score): 0 (Not tested)  Wheelchair Setup Assist Required : 3 (Moderate assistance)  Wheelchair Management: Manages left brake;Manages right brake(manages both brakes with L UE; extender on R brake)       THERAPEUTIC EXERCISES Daily Assessment    Extremity: Both  Exercise Type #1: Seated lower extremity strengthening  Sets Performed: 2  Reps Performed: 10  Level of Assist: Minimal assistance       Neuro Re-Education:  See above for sit<>stand. Pt required mod/max A for weight shifting R<>L and A<>P to promote equal WB and stance in midline. Various reaching activities sitting EOB/EOM. Max cues and min facilitation to achieve upright sitting posture statically and dynamically. ASSESSMENT:  Pt demos improved muscle activation during TherEx requiring min A to isolate motion/decrease compensatory strategies. Pt required max cues for protection/involvement of R UE with bed mobility.  Pt requires knee blocking to R LE for stability to achieve stand. Increased assistance with PM compared to AM session secondary to fatigue. Progression toward goals:  [x]      Improving appropriately and progressing toward goals  []      Improving slowly and progressing toward goals  []      Not making progress toward goals and plan of care will be adjusted     PLAN:  Patient continues to benefit from skilled intervention to address the above impairments. Continue treatment per established plan of care. Discharge Recommendations:  [address] pending progress  Further Equipment Recommendations for Discharge:  TBD     Estimated LOS: 4 weeks    Activity Tolerance:   Fair  Please refer to the flowsheet for vital signs taken during this treatment. After treatment:   Patient left with wife pushing w/c back to room.        Brett Reyes, PTA  8/22/2019

## 2019-08-22 NOTE — ROUTINE PROCESS
[facility]    Bedside and Verbal shift change report given to Aung Olvera, RN (oncoming nurse) by Ashley Ortega RN   (offgoing nurse). Report included the following information SBAR, Kardex, MAR and Recent Results.     Situation:   Code Status: Full Code   Reason for Admission: CVA  Hospital Day: 7   Problem List:   Hospital Problems  Date Reviewed: 8/22/2019          Codes Class Noted POA    Vitamin D deficiency (Chronic) ICD-10-CM: E55.9  ICD-9-CM: 268.9  8/19/2019 Yes    Overview Signed 8/19/2019  1:34 PM by Shane Luis MD     Vitamin D 25-Hydroxy (8/19/2019) = 16.2              Leukocytosis ICD-10-CM: X92.817  ICD-9-CM: 288.60  8/16/2019 Yes    Overview Signed 8/16/2019 11:19 AM by Shane Luis MD     WBC count (8/16/2019) = 17.9             Hypertensive heart and kidney disease without heart failure and with stage 2 chronic kidney disease (Chronic) ICD-10-CM: I13.10, N18.2  ICD-9-CM: 404.90, 585.2  Unknown Yes        Chronic hypokalemia ICD-10-CM: E87.6  ICD-9-CM: 276.8  Unknown Yes    Overview Addendum 8/15/2019 11:31 AM by Shane Luis MD     Persistent chronic hypokalemia + hypertension; ?primary hyperaldosteronism             Type 2 diabetes mellitus with stage 2 chronic kidney disease (HCC) (Chronic) ICD-10-CM: E11.22, N18.2  ICD-9-CM: 250.40, 585.2  Unknown Yes    Overview Signed 8/15/2019 12:26 PM by Shane Luis MD     HbA1c (8/13/2019) = 6.6             Pure hypercholesterolemia (Chronic) ICD-10-CM: E78.00  ICD-9-CM: 272.0  8/15/2019 Yes    Overview Addendum 8/15/2019  6:27 PM by Shane Luis MD     Lipid profile (8/13/2019) showed , , HDL 42, ; Lipid profile (8/14/2019) showed , , HDL 39, LDL 94             Current use of aspirin ICD-10-CM: Z79.82  ICD-9-CM: V58.66  8/14/2019 Yes        On statin therapy due to risk of future cardiovascular event ICD-10-CM: Z79.899  ICD-9-CM: V58.69  8/14/2019 Yes    Overview Signed 8/15/2019 12:21 PM by Betty Bishop MD     On Atorvastatin             Vitamin B12 deficiency anemia (Chronic) ICD-10-CM: D51.9  ICD-9-CM: 281.1  8/14/2019 Yes    Overview Signed 8/19/2019  1:24 PM by Betty Bishop MD     Vitamin B12 (8/14/2019) = 208             Iron deficiency anemia (Chronic) ICD-10-CM: D50.9  ICD-9-CM: 280.9  8/14/2019 Yes    Overview Signed 8/19/2019  1:25 PM by Betty Bishop MD     Anemia work-up (8/14/2019) showed serum iron 22, TIBC 371, iron % saturation 6, ferritin 34, reticulocyte count 1.4               Refusal of statin medication by patient ICD-10-CM: Z53.29  ICD-9-CM: V64.2  8/13/2019 Yes    Overview Signed 8/15/2019  2:01 PM by Betty Bishop MD     As per note of Neurology (Dr. Virginia Lopez), patient refuses statin agent because of potential side effects.               * (Principal) [facility] ICD-10-CM: I63.9  ICD-9-CM: 434.91  8/12/2019 Yes    Overview Signed 8/15/2019 12:17 AM by Betty Bishop MD     Acute Ischemic Stroke (acute/early subacute infarct at the left posterior basal ganglia to corona radiata) with residual right hemiparesis, dysphagia and dysarthria             Impaired mobility and ADLs ICD-10-CM: Z74.09  ICD-9-CM: 799.89  8/12/2019 Yes        Received intravenous tissue plasminogen activator (tPA) in emergency department ICD-10-CM: Z92.82  ICD-9-CM: V45.88  8/12/2019 Yes        Right hemiparesis ([facility] ICD-10-CM: G81.91  ICD-9-CM: 342.90  8/12/2019 Yes        Dysphagia ICD-10-CM: R13.10  ICD-9-CM: 787.20  8/12/2019 Yes        Dysarthria ICD-10-CM: R47.1  ICD-9-CM: 784.51  8/12/2019 Yes              Background:   Past Medical History:   Past Medical History:   Diagnosis Date    Acute ischemic stroke ([facility] 8/12/2019    Acute Ischemic Stroke (acute/early subacute infarct at the left posterior basal ganglia to corona radiata) with residual right hemiparesis, dysphagia and dysarthria    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     On Allopurinol    Chronic hypokalemia     Persistent chronic hypokalemia + hypertension; ?primary hyperaldosteronism    CKD (chronic kidney disease) stage 2, GFR 60-89 ml/min 8/15/2019    Current use of aspirin 8/14/2019    Dysarthria 8/12/2019    Dysphagia 8/12/2019    Elevated prostate specific antigen (PSA) 7/21/2011    First degree atrioventricular block by electrocardiogram 8/12/2019    Hypertensive heart and kidney disease without heart failure and with stage 2 chronic kidney disease     Iron deficiency anemia 8/14/2019    Anemia work-up (8/14/2019) showed serum iron 22, TIBC 371, iron % saturation 6, ferritin 34, reticulocyte count 1.4     Obesity, Class I, BMI 30-34.9     Obstructive sleep apnea on CPAP     On statin therapy due to risk of future cardiovascular event 8/14/2019    On Atorvastatin    Pure hypercholesterolemia 08/15/2019    Lipid profile (8/13/2019) showed , , HDL 42, ; Lipid profile (8/14/2019) showed , , HDL 39, LDL 94    Received intravenous tissue plasminogen activator (tPA) in emergency department 8/12/2019    Refusal of statin medication by patient 8/13/2019    As per note of Neurology (Dr. Markos Live), patient refuses statin agent because of potential side effects.  Right hemiparesis ([address] 8/12/2019    Type 2 diabetes mellitus with stage 2 chronic kidney disease (HCC)     HbA1c (8/13/2019) = 6.6    Vitamin B12 deficiency anemia 8/14/2019    Vitamin B12 (8/14/2019) = 208    Vitamin D deficiency 8/19/2019    Vitamin D 25-Hydroxy (8/19/2019) = 16.2       Patient taking anticoagulants yes Heparin, ASA   Patient has a defibrillator: no     Assessment:   Changes in Assessment throughout shift: No     Patient has central line: no Reasons if yes: Insertion date: Last dressing date:   Patient has Boykin Cath: no Reasons if yes:     Insertion date:     Last Vitals:     Vitals:    08/21/19 2100 08/22/19 0728 08/22/19 1325 08/22/19 1549   BP: 162/75 155/77 139/71 150/71   Pulse: 75 71 66 70   Resp: 18 18 19   Temp: 98 °F (36.7 °C) 97.6 °F (36.4 °C)  97.8 °F (36.6 °C)   SpO2: 97% 98%  97%   Weight:       Height:            PAIN    Pain Assessment    Pain Intensity 1: 0 (08/22/19 1607) Pain Intensity 1: 2 (12/29/14 1105)    Pain Location 1: Shoulder Pain Location 1: Abdomen    Pain Intervention(s) 1: Medication (see MAR) Pain Intervention(s) 1: Medication (see MAR)  Patient Stated Pain Goal: 0 Patient Stated Pain Goal: 0  o Intervention effective: no    o Other actions taken for pain:      Skin Assessment  Skin color Skin Color: Appropriate for ethnicity  Condition/Temperature Skin Condition/Temp: Dry  Integrity Skin Integrity: Tear, Intact  Turgor Turgor: Non-tenting  Weekly Pressure Ulcer Documentation  Pressure  Injury Documentation: No Pressure Injury Noted-Pressure Ulcer Prevention Initiated  Wound Prevention & Protection Methods  Orientation of wound Orientation of Wound Prevention: Posterior  Location of Prevention Location of Wound Prevention: Sacrum/Coccyx  Dressing Present Dressing Present : Yes  Dressing Status Dressing Status: Intact  Wound Offloading Wound Offloading (Prevention Methods): Bed, pressure redistribution/air     INTAKE/OUPUT  Date 08/21/19 0700 - 08/22/19 0659 08/22/19 0700 - 08/23/19 0659   Shift 8614-5358 2467-0591 24 Hour Total 1975-8748 8163-3134 24 Hour Total   INTAKE   P.O. 720  720 720  720     P. O. 720  720 720  720   Shift Total(mL/kg) 720(7.1)  720(7.1) 720(7.1)  720(7.1)   OUTPUT   Urine(mL/kg/hr)  300(0.2) 300(0.1) 200  200     Urine Voided  300 300 200  200     Urine Occurrence(s) 4 x 2 x 6 x 4 x  4 x   Stool           Stool Occurrence(s) 1 x 0 x 1 x 1 x  1 x   Shift Total(mL/kg)  300(2.9) 300(2.9) 200(2)  200(2)    -300 420 520  520   Weight (kg) 102 102 102 102 102 102       Recommendations:  1. Patient needs and requests:     2. Diet: Cardiac Mech Soft Nectar Thick     3. Pending tests/procedures:      4.  Functional Level/Equipment:     5. Estimated Discharge Date: 8/24/19 Posted on Whiteboard in Patients Room: yes     HEALS Safety Check    A safety check occurred in the patient's room between off going nurse and oncoming nurse listed above. The safety check included the below items  Area Items   H  High Alert Medications - Verify all high alert medication drips (heparin, PCA, etc.)   E  Equipment - Suction is set up for ALL patients (with diann)  - Red plugs utilized for all equipment (IV pumps, etc.)  - WOWs wiped down at end of shift.  - Room stocked with oxygen, suction, and other unit-specific supplies   A  Alarms - Bed alarm is set for fall risk patients  - Ensure chair alarm is in place and activated if patient is up in a chair   L  Lines - Check IV for any infiltration  - Boykin bag is empty if patient has a Boykin   - Tubing and IV bags are labeled   S  Safety   - Room is clean, patient is clean, and equipment is clean. - Hallways are clear from equipment besides carts. - Fall bracelet on for fall risk patients  - Ensure room is clear and free of clutter  - Suction is set up for ALL patients (with diann)  - Hallways are clear from equipment besides carts.    - Isolation precautions followed, supplies available outside room, sign posted

## 2019-08-22 NOTE — CONSULTS
Griffin level is not elevated. 24 hour urine griffin is pending. The aldosterone/renin level is not high. If he has primary aldosteronism, it is very likely to be idiopathic rather an an aldosterone producing adenoma. This is susceptible to medical therapy. Will start spironolactone at 50 mg twice daily.

## 2019-08-22 NOTE — ROUTINE PROCESS
[facility]    Bedside and Verbal shift change report given to Lourdes Lerner, RN (oncoming nurse) by Mustapha Brady RN   (offgoing nurse). Report included the following information SBAR, Kardex, MAR and Recent Results.     Situation:   Code Status: Full Code   Reason for Admission: CVA  Hospital Day: 8   Problem List:   Hospital Problems  Date Reviewed: 8/22/2019          Codes Class Noted POA    Vitamin D deficiency (Chronic) ICD-10-CM: E55.9  ICD-9-CM: 268.9  8/19/2019 Yes    Overview Signed 8/19/2019  1:34 PM by Sandra Barraza MD     Vitamin D 25-Hydroxy (8/19/2019) = 16.2              Leukocytosis ICD-10-CM: G22.894  ICD-9-CM: 288.60  8/16/2019 Yes    Overview Signed 8/16/2019 11:19 AM by Sandra Barraza MD     WBC count (8/16/2019) = 17.9             Hypertensive heart and kidney disease without heart failure and with stage 2 chronic kidney disease (Chronic) ICD-10-CM: I13.10, N18.2  ICD-9-CM: 404.90, 585.2  Unknown Yes        Chronic hypokalemia ICD-10-CM: E87.6  ICD-9-CM: 276.8  Unknown Yes    Overview Addendum 8/15/2019 11:31 AM by Sandra Barraza MD     Persistent chronic hypokalemia + hypertension; ?primary hyperaldosteronism             Type 2 diabetes mellitus with stage 2 chronic kidney disease (HCC) (Chronic) ICD-10-CM: E11.22, N18.2  ICD-9-CM: 250.40, 585.2  Unknown Yes    Overview Signed 8/15/2019 12:26 PM by Sandra Barraza MD     HbA1c (8/13/2019) = 6.6             Pure hypercholesterolemia (Chronic) ICD-10-CM: E78.00  ICD-9-CM: 272.0  8/15/2019 Yes    Overview Addendum 8/15/2019  6:27 PM by Sandra Barraza MD     Lipid profile (8/13/2019) showed , , HDL 42, ; Lipid profile (8/14/2019) showed , , HDL 39, LDL 94             Current use of aspirin ICD-10-CM: Z79.82  ICD-9-CM: V58.66  8/14/2019 Yes        On statin therapy due to risk of future cardiovascular event ICD-10-CM: Z79.899  ICD-9-CM: V58.69  8/14/2019 Yes    Overview Signed 8/15/2019 12:21 PM by Vikram Wadsworth MD     On Atorvastatin             Vitamin B12 deficiency anemia (Chronic) ICD-10-CM: D51.9  ICD-9-CM: 281.1  8/14/2019 Yes    Overview Signed 8/19/2019  1:24 PM by Vikram Wadsworth MD     Vitamin B12 (8/14/2019) = 208             Iron deficiency anemia (Chronic) ICD-10-CM: D50.9  ICD-9-CM: 280.9  8/14/2019 Yes    Overview Signed 8/19/2019  1:25 PM by Vikram Wadsworth MD     Anemia work-up (8/14/2019) showed serum iron 22, TIBC 371, iron % saturation 6, ferritin 34, reticulocyte count 1.4               Refusal of statin medication by patient ICD-10-CM: Z53.29  ICD-9-CM: V64.2  8/13/2019 Yes    Overview Signed 8/15/2019  2:01 PM by Vikram Wadsworth MD     As per note of Neurology (Dr. Wellington Mcfadden), patient refuses statin agent because of potential side effects.               * (Principal) Acute ischemic stroke ([facility] ICD-10-CM: I63.9  ICD-9-CM: 434.91  8/12/2019 Yes    Overview Signed 8/15/2019 12:17 AM by Vikram Wadsworth MD     Acute Ischemic Stroke (acute/early subacute infarct at the left posterior basal ganglia to corona radiata) with residual right hemiparesis, dysphagia and dysarthria             Impaired mobility and ADLs ICD-10-CM: Z74.09  ICD-9-CM: 799.89  8/12/2019 Yes        Received intravenous tissue plasminogen activator (tPA) in emergency department ICD-10-CM: Z92.82  ICD-9-CM: V45.88  8/12/2019 Yes        [facility] ICD-10-CM: G81.91  ICD-9-CM: 342.90  8/12/2019 Yes        Dysphagia ICD-10-CM: R13.10  ICD-9-CM: 787.20  8/12/2019 Yes        Dysarthria ICD-10-CM: R47.1  ICD-9-CM: 784.51  8/12/2019 Yes              Background:   Past Medical History:   Past Medical History:   Diagnosis Date    Acute ischemic stroke ([address] 8/12/2019    Acute Ischemic Stroke (acute/early subacute infarct at the left posterior basal ganglia to corona radiata) with residual right hemiparesis, dysphagia and dysarthria    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     On Allopurinol    Chronic hypokalemia     Persistent chronic hypokalemia + hypertension; ?primary hyperaldosteronism    CKD (chronic kidney disease) stage 2, GFR 60-89 ml/min 8/15/2019    Current use of aspirin 8/14/2019    Dysarthria 8/12/2019    Dysphagia 8/12/2019    Elevated prostate specific antigen (PSA) 7/21/2011    First degree atrioventricular block by electrocardiogram 8/12/2019    Hypertensive heart and kidney disease without heart failure and with stage 2 chronic kidney disease     Iron deficiency anemia 8/14/2019    Anemia work-up (8/14/2019) showed serum iron 22, TIBC 371, iron % saturation 6, ferritin 34, reticulocyte count 1.4     Obesity, Class I, BMI 30-34.9     Obstructive sleep apnea on CPAP     On statin therapy due to risk of future cardiovascular event 8/14/2019    On Atorvastatin    Pure hypercholesterolemia 08/15/2019    Lipid profile (8/13/2019) showed , , HDL 42, ; Lipid profile (8/14/2019) showed , , HDL 39, LDL 94    Received intravenous tissue plasminogen activator (tPA) in emergency department 8/12/2019    Refusal of statin medication by patient 8/13/2019    As per note of Neurology (Dr. Herrera Sauceda), patient refuses statin agent because of potential side effects.  Right hemiparesis ([address] 8/12/2019    Type 2 diabetes mellitus with stage 2 chronic kidney disease (HCC)     HbA1c (8/13/2019) = 6.6    Vitamin B12 deficiency anemia 8/14/2019    Vitamin B12 (8/14/2019) = 208    Vitamin D deficiency 8/19/2019    Vitamin D 25-Hydroxy (8/19/2019) = 16.2       Patient taking anticoagulants yes Heparin, ASA   Patient has a defibrillator: no     Assessment:   Changes in Assessment throughout shift: No     Patient has central line: no Reasons if yes: Insertion date: Last dressing date:   Patient has Boykin Cath: no Reasons if yes:     Insertion date:     Last Vitals:     Vitals:    08/22/19 1549 08/22/19 2132 08/22/19 2159 08/23/19 0635   BP: 150/71 162/70 162/70 190/78   Pulse: 70 69 69 77   Resp: 19 18 18    Temp: 97.8 °F (36.6 °C) 97 °F (36.1 °C) 97 °F (36.1 °C)    SpO2: 97% 96% 96%    Weight:       Height:            PAIN    Pain Assessment    Pain Intensity 1: 0 (08/23/19 0400) Pain Intensity 1: 2 (12/29/14 1105)    Pain Location 1: Shoulder Pain Location 1: Abdomen    Pain Intervention(s) 1: Medication (see MAR) Pain Intervention(s) 1: Medication (see MAR)  Patient Stated Pain Goal: 0 Patient Stated Pain Goal: 0  o Intervention effective: no    o Other actions taken for pain:      Skin Assessment  Skin color Skin Color: Appropriate for ethnicity  Condition/Temperature Skin Condition/Temp: Dry, Warm  Integrity Skin Integrity: Tear(skin tear to right wrist)  Turgor Turgor: Epidermis thin w/ loss of subcut tissue  Weekly Pressure Ulcer Documentation  Pressure  Injury Documentation: No Pressure Injury Noted-Pressure Ulcer Prevention Initiated  Wound Prevention & Protection Methods  Orientation of wound Orientation of Wound Prevention: Posterior  Location of Prevention Location of Wound Prevention: Sacrum/Coccyx  Dressing Present Dressing Present : Yes  Dressing Status Dressing Status: Intact  Wound Offloading Wound Offloading (Prevention Methods): Bed, pressure reduction mattress, Repositioning     INTAKE/OUPUT  Date 08/22/19 0700 - 08/23/19 0659 08/23/19 0700 - 08/24/19 0659   Shift 3207-7147 7333-9467 24 Hour Total 8502-3319 8248-4801 24 Hour Total   INTAKE   P.O. 720  720        P. O. 720  720      Shift Total(mL/kg) 720(7.1)  720(7.1)      OUTPUT   Urine(mL/kg/hr) 200(0.2) 470(0.4) 670(0.3)        Urine Voided 200 470 670        Urine Occurrence(s) 4 x 2 x 6 x      Stool           Stool Occurrence(s) 1 x 0 x 1 x      Shift Total(mL/kg) 200(2) 470(4.6) 670(6.6)       -470 50      Weight (kg) 102 102 102 102 102 102       Recommendations:  1. Patient needs and requests:     2. Diet: Cardiac Mech Soft Nectar Thick     3.  Pending tests/procedures: 4. Functional Level/Equipment:     5. Estimated Discharge Date: 8/24/19 Posted on Whiteboard in Patients Room: yes     HEALS Safety Check    A safety check occurred in the patient's room between off going nurse and oncoming nurse listed above. The safety check included the below items  Area Items   H  High Alert Medications - Verify all high alert medication drips (heparin, PCA, etc.)   E  Equipment - Suction is set up for ALL patients (with diann)  - Red plugs utilized for all equipment (IV pumps, etc.)  - WOWs wiped down at end of shift.  - Room stocked with oxygen, suction, and other unit-specific supplies   A  Alarms - Bed alarm is set for fall risk patients  - Ensure chair alarm is in place and activated if patient is up in a chair   L  Lines - Check IV for any infiltration  - Boykin bag is empty if patient has a Boykin   - Tubing and IV bags are labeled   S  Safety   - Room is clean, patient is clean, and equipment is clean. - Hallways are clear from equipment besides carts. - Fall bracelet on for fall risk patients  - Ensure room is clear and free of clutter  - Suction is set up for ALL patients (with diann)  - Hallways are clear from equipment besides carts.    - Isolation precautions followed, supplies available outside room, sign posted

## 2019-08-23 PROBLEM — M19.071 PRIMARY OSTEOARTHRITIS OF RIGHT ANKLE: Chronic | Status: ACTIVE | Noted: 2019-08-22

## 2019-08-23 LAB
GLUCOSE BLD STRIP.AUTO-MCNC: 114 MG/DL (ref 70–110)
GLUCOSE BLD STRIP.AUTO-MCNC: 116 MG/DL (ref 70–110)
GLUCOSE BLD STRIP.AUTO-MCNC: 137 MG/DL (ref 70–110)
GLUCOSE BLD STRIP.AUTO-MCNC: 141 MG/DL (ref 70–110)
TRANSFERRIN SERPL-MCNC: 276 MG/DL (ref 200–370)

## 2019-08-23 PROCEDURE — 97116 GAIT TRAINING THERAPY: CPT

## 2019-08-23 PROCEDURE — 97530 THERAPEUTIC ACTIVITIES: CPT

## 2019-08-23 PROCEDURE — 92526 ORAL FUNCTION THERAPY: CPT

## 2019-08-23 PROCEDURE — 82962 GLUCOSE BLOOD TEST: CPT

## 2019-08-23 PROCEDURE — 92507 TX SP LANG VOICE COMM INDIV: CPT

## 2019-08-23 PROCEDURE — 97112 NEUROMUSCULAR REEDUCATION: CPT

## 2019-08-23 PROCEDURE — 74011250637 HC RX REV CODE- 250/637: Performed by: INTERNAL MEDICINE

## 2019-08-23 PROCEDURE — 74011250636 HC RX REV CODE- 250/636: Performed by: INTERNAL MEDICINE

## 2019-08-23 PROCEDURE — 97110 THERAPEUTIC EXERCISES: CPT

## 2019-08-23 PROCEDURE — 65310000000 HC RM PRIVATE REHAB

## 2019-08-23 RX ADMIN — HYDRALAZINE HYDROCHLORIDE 75 MG: 50 TABLET, FILM COATED ORAL at 21:14

## 2019-08-23 RX ADMIN — Medication 5000 UNITS: at 09:04

## 2019-08-23 RX ADMIN — BACLOFEN 5 MG: 10 TABLET ORAL at 17:03

## 2019-08-23 RX ADMIN — BACLOFEN 5 MG: 10 TABLET ORAL at 06:36

## 2019-08-23 RX ADMIN — SPIRONOLACTONE 50 MG: 25 TABLET ORAL at 09:04

## 2019-08-23 RX ADMIN — Medication 400 MG: at 09:04

## 2019-08-23 RX ADMIN — ISOSORBIDE DINITRATE 30 MG: 20 TABLET ORAL at 14:53

## 2019-08-23 RX ADMIN — AMLODIPINE BESYLATE 10 MG: 10 TABLET ORAL at 09:04

## 2019-08-23 RX ADMIN — ASPIRIN 81 MG 81 MG: 81 TABLET ORAL at 09:04

## 2019-08-23 RX ADMIN — FERROUS SULFATE TAB 325 MG (65 MG ELEMENTAL FE) 325 MG: 325 (65 FE) TAB at 09:04

## 2019-08-23 RX ADMIN — BACLOFEN 5 MG: 10 TABLET ORAL at 12:50

## 2019-08-23 RX ADMIN — HEPARIN SODIUM 5000 UNITS: 5000 INJECTION INTRAVENOUS; SUBCUTANEOUS at 21:15

## 2019-08-23 RX ADMIN — Medication 250 MG: at 09:04

## 2019-08-23 RX ADMIN — LABETALOL HCL 600 MG: 200 TABLET, FILM COATED ORAL at 21:14

## 2019-08-23 RX ADMIN — ISOSORBIDE DINITRATE 30 MG: 20 TABLET ORAL at 06:35

## 2019-08-23 RX ADMIN — SPIRONOLACTONE 50 MG: 25 TABLET ORAL at 21:14

## 2019-08-23 RX ADMIN — HEPARIN SODIUM 5000 UNITS: 5000 INJECTION INTRAVENOUS; SUBCUTANEOUS at 06:35

## 2019-08-23 RX ADMIN — HYDRALAZINE HYDROCHLORIDE 75 MG: 50 TABLET, FILM COATED ORAL at 06:36

## 2019-08-23 RX ADMIN — HYDRALAZINE HYDROCHLORIDE 75 MG: 50 TABLET, FILM COATED ORAL at 14:53

## 2019-08-23 RX ADMIN — LABETALOL HCL 600 MG: 200 TABLET, FILM COATED ORAL at 09:04

## 2019-08-23 RX ADMIN — ATORVASTATIN CALCIUM 80 MG: 40 TABLET, FILM COATED ORAL at 21:15

## 2019-08-23 RX ADMIN — HEPARIN SODIUM 5000 UNITS: 5000 INJECTION INTRAVENOUS; SUBCUTANEOUS at 14:53

## 2019-08-23 RX ADMIN — CYANOCOBALAMIN TAB 1000 MCG 1000 MCG: 1000 TAB at 09:04

## 2019-08-23 RX ADMIN — ISOSORBIDE DINITRATE 30 MG: 20 TABLET ORAL at 21:14

## 2019-08-23 NOTE — PROGRESS NOTES
Problem: Mobility Impaired (Adult and Pediatric)  Goal: *Acute Goals and Plan of Care (Insert Text)  Description  Physical Therapy Short Term Goals  Initiated 2019 and to be accomplished within 14 day(s)  1. Patient will move from supine to sit and sit to supine  in bed with minimal assistance/contact guard assist.     2.  Patient will transfer from bed to chair and chair to bed with minimal/moderate assistance using the least restrictive device. 3.  Patient will perform sit to stand with minimal assistance/moderate assistance. 4.  Patient will ambulate with moderate assistance  for 10 feet with the least restrictive device. Physical Therapy Goals  Initiated 2019 and to be accomplished within 4 weeks  1. Patient will move from supine to sit and sit to supine  in bed with supervision/set-up. 2.  Patient will transfer from bed to chair and chair to bed with supervision/set-up using the least restrictive device. 3.  Patient will perform sit to stand with supervision/set-up. 4.  Patient will ambulate with minimal assistance for 50 feet with the least restrictive device. 5.  Patient will ascend/descend 2 stairs with 1 handrail(s) with moderate assistance . Outcome: Progressing Towards Goal    PHYSICAL THERAPY TREATMENT    Patient: Sumeet Poe (22 y.o. male)  Date: 2019  Diagnosis: Acute ischemic stroke [facility]) [I63.9] Acute ischemic stroke [facility])  Precautions: Aspiration, NWB  Chart, physical therapy assessment, plan of care and goals were reviewed. Time In: 0930  Time Out: 1030  Patient Seen For: AM;Balance activities;Gait training;Transfer training; Wheelchair mobility  Pain:  Pt pain was reported as  0/10  pre-treatment. Pt pain was reported as 0/10 post-treatment. Intervention: N/A    Patient identified with name and : Yes    SUBJECTIVE:     Pt states \"Yes ma'am, I can feel it working. \"    OBJECTIVE DATA SUMMARY:   Objective: Pt instructed and assisted in the following activities to increase ease of transition to next level of care. BED/MAT MOBILITY Daily Assessment    Supine to Sit : 4 (Minimal assistance)  Sit to Supine : 4 (Minimal assistance)     Pt demos increased difficulty with R LE during sit>supine this session. Pt able to actively lift and adduct R LE to place on top of L LE for supine>sit. TRANSFERS Daily Assessment    Other: Stand step without device  Transfer Assistance : 4 (Minimal assistance)  Sit to Stand Assistance: Minimal assistance     Pt improved with stand step transfers demonstrating decreased impulsivity and increased safety awareness. GAIT Daily Assessment    Pre-gait activities:  Weight shifting  R<>L and A<>P  Stepping fwd/back with B LE 3x5 reps  Stepping laterally with BLE 2x5 reps     Pt slides R LE back to starting position. Pt mod A for balance for all pre-gait activities. Pt demos decreased impulsivity and executes movements more slowly and safely. BALANCE Daily Assessment    Sitting - Static: Good (unsupported)  Sitting - Dynamic: Fair (occasional)(+)  Standing - Static: Fair(-)  Standing - Dynamic : Impaired       WHEELCHAIR MOBILITY Daily Assessment    Able to Propel (ft): 150 feet(100 ft at the beginning of session, 50 ft at end of session)  Functional Level: 5  Curbs/Ramps Assist Required (FIM Score): 0 (Not tested)  Wheelchair Setup Assist Required : 4 (Minimal assistance)(removal/placement of R footrest)  Wheelchair Management: Manages right brake;Manages left brake(manages both brakes with L UE; extender on R brake)       Neuro Re-Education:  Pt challenged with various bilateral sitting challenges (weight shifting, leaning to WB on arms, trunk rotations, back extension, reaching, lateral trunk flexion) to promote bilateral trunk control. Pt instructed in 15 reps of sit<>stands min A from EOM with 10 reps focused on weight shifting to R for increased WB.  Pt also challenged with various weight shifting and stepping activities bilaterally in standing. Pt demos decreased R knee hyperextension when R LE accepts weight. ASSESSMENT:  Pt presents with improved posture when provided minimal cuing. Increased muscle activation noted on R scapula with scapular retraction. Pt initially challenged with weight shifting to R LE just before sit<>stand transfer, but improved throughout session with fair+ carryover. Pt improved with stand step transfers demonstrating decreased impulsivity and increased safety awareness. Pt tolerated this session well compared to yesterday PM, secondary to increased rest breaks. Progression toward goals:  ?      Improving appropriately and progressing toward goals  ? Improving slowly and progressing toward goals  ? Not making progress toward goals and plan of care will be adjusted     PLAN:  Patient continues to benefit from skilled intervention to address the above impairments. Continue treatment per established plan of care. Discharge Recommendations:  [address], pending progress  Further Equipment Recommendations for Discharge:  TBD, pending progress      Estimated LOS: 4 weeks    Activity Tolerance:   Fair +  Please refer to the flowsheet for vital signs taken during this treatment. After treatment:   Patient left in W/C in dining room for support group.        Gisell Zapata, PTA  8/23/2019

## 2019-08-23 NOTE — ROUTINE PROCESS
SHIFT CHANGE NOTE FOR [facility]    Bedside and Verbal shift change report given to Anais Bhakta RN (oncoming nurse) by Sim Frank RN   (offgoing nurse). Report included the following information SBAR, Kardex, MAR and Recent Results. Situation:   Code Status: Full Code   Reason for Admission: CVA  Hospital Day: 8   Problem List:   Hospital Problems  Date Reviewed: 8/23/2019          Codes Class Noted POA    Primary osteoarthritis of right ankle (Chronic) ICD-10-CM: M19.071  ICD-9-CM: 715.17  8/22/2019 Yes    Overview Signed 8/23/2019 11:54 AM by Lianet James MD     X-ray of the right ankle (8/22/2019) showed no acute fracture or subluxation; advanced degenerative changes at the tibiotalar joint; old fracture fragment vs. accessory ossicle in the inferior aspect of the lateral malleolus; soft tissue swelling around the ankle.              Vitamin D deficiency (Chronic) ICD-10-CM: E55.9  ICD-9-CM: 268.9  8/19/2019 Yes    Overview Signed 8/19/2019  1:34 PM by Lianet James MD     Vitamin D 25-Hydroxy (8/19/2019) = 16.2              Leukocytosis ICD-10-CM: W51.299  ICD-9-CM: 288.60  8/16/2019 Yes    Overview Signed 8/16/2019 11:19 AM by Lianet James MD     WBC count (8/16/2019) = 17.9             Hypertensive heart and kidney disease without heart failure and with stage 2 chronic kidney disease (Chronic) ICD-10-CM: I13.10, N18.2  ICD-9-CM: 404.90, 585.2  Unknown Yes        Chronic hypokalemia ICD-10-CM: E87.6  ICD-9-CM: 276.8  Unknown Yes    Overview Addendum 8/15/2019 11:31 AM by Lianet James MD     Persistent chronic hypokalemia + hypertension; ?primary hyperaldosteronism             Type 2 diabetes mellitus with stage 2 chronic kidney disease ([facility] [address] (Chronic) ICD-10-CM: E11.22, N18.2  ICD-9-CM: 250.40, 585.2  Unknown Yes    Overview Signed 8/15/2019 12:26 PM by Lianet James MD     HbA1c (8/13/2019) = 6.6             Pure hypercholesterolemia (Chronic) ICD-10-CM: E78.00  ICD-9-CM: 272.0  8/15/2019 Yes    Overview Addendum 8/15/2019  6:27 PM by Vikram Wadsworth MD     Lipid profile (8/13/2019) showed , , HDL 42, ; Lipid profile (8/14/2019) showed , , HDL 39, LDL 94             Current use of aspirin ICD-10-CM: Z79.82  ICD-9-CM: V58.66  8/14/2019 Yes        On statin therapy due to risk of future cardiovascular event ICD-10-CM: Z79.899  ICD-9-CM: V58.69  8/14/2019 Yes    Overview Signed 8/15/2019 12:21 PM by Vikram Wadsworth MD     On Atorvastatin             Vitamin B12 deficiency anemia (Chronic) ICD-10-CM: D51.9  ICD-9-CM: 281.1  8/14/2019 Yes    Overview Signed 8/19/2019  1:24 PM by Vikram Wadsworth MD     Vitamin B12 (8/14/2019) = 208             Iron deficiency anemia (Chronic) ICD-10-CM: D50.9  ICD-9-CM: 280.9  8/14/2019 Yes    Overview Signed 8/19/2019  1:25 PM by Vikram Wadsworth MD     Anemia work-up (8/14/2019) showed serum iron 22, TIBC 371, iron % saturation 6, ferritin 34, reticulocyte count 1.4               Refusal of statin medication by patient ICD-10-CM: Z53.29  ICD-9-CM: V64.2  8/13/2019 Yes    Overview Signed 8/15/2019  2:01 PM by Vikram Wadsworth MD     As per note of Neurology (Dr. Wellington Mcfadden), patient refuses statin agent because of potential side effects.               * (Principal) Acute ischemic stroke [facility]) ICD-10-CM: I63.9  ICD-9-CM: 434.91  8/12/2019 Yes    Overview Signed 8/15/2019 12:17 AM by Vikram Wadsworth MD     Acute Ischemic Stroke (acute/early subacute infarct at the left posterior basal ganglia to corona radiata) with residual right hemiparesis, dysphagia and dysarthria             Impaired mobility and ADLs ICD-10-CM: Z74.09  ICD-9-CM: 799.89  8/12/2019 Yes        Received intravenous tissue plasminogen activator (tPA) in emergency department ICD-10-CM: Z92.82  ICD-9-CM: V45.88  8/12/2019 Yes        Right hemiparesis ([address] ICD-10-CM: G81.91  ICD-9-CM: 342.90  8/12/2019 Yes        Dysphagia ICD-10-CM: R13.10  ICD-9-CM: 787.20  8/12/2019 Yes        Dysarthria ICD-10-CM: R47.1  ICD-9-CM: 784.51  8/12/2019 Yes              Background:   Past Medical History:   Past Medical History:   Diagnosis Date    Acute ischemic stroke ([facility] [address] 8/12/2019    Acute Ischemic Stroke (acute/early subacute infarct at the left posterior basal ganglia to corona radiata) with residual right hemiparesis, dysphagia and dysarthria    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     On Allopurinol    Chronic hypokalemia     Persistent chronic hypokalemia + hypertension; ?primary hyperaldosteronism    CKD (chronic kidney disease) stage 2, GFR 60-89 ml/min 8/15/2019    Current use of aspirin 8/14/2019    Dysarthria 8/12/2019    Dysphagia 8/12/2019    Elevated prostate specific antigen (PSA) 7/21/2011    First degree atrioventricular block by electrocardiogram 8/12/2019    Hypertensive heart and kidney disease without heart failure and with stage 2 chronic kidney disease     Iron deficiency anemia 8/14/2019    Anemia work-up (8/14/2019) showed serum iron 22, TIBC 371, iron % saturation 6, ferritin 34, reticulocyte count 1.4     Obesity, Class I, BMI 30-34.9     Obstructive sleep apnea on CPAP     On statin therapy due to risk of future cardiovascular event 8/14/2019    On Atorvastatin    Primary osteoarthritis of right ankle 8/22/2019    X-ray of the right ankle (8/22/2019) showed no acute fracture or subluxation; advanced degenerative changes at the tibiotalar joint; old fracture fragment vs. accessory ossicle in the inferior aspect of the lateral malleolus; soft tissue swelling around the ankle.     Pure hypercholesterolemia 08/15/2019    Lipid profile (8/13/2019) showed , , HDL 42, ; Lipid profile (8/14/2019) showed , , HDL 39, LDL 94    Received intravenous tissue plasminogen activator (tPA) in emergency department 8/12/2019    Refusal of statin medication by patient 8/13/2019    As per note of Neurology (Dr. Mimi Silva), patient refuses statin agent because of potential side effects.  Right hemiparesis ([address] 8/12/2019    Type 2 diabetes mellitus with stage 2 chronic kidney disease (HCC)     HbA1c (8/13/2019) = 6.6    Vitamin B12 deficiency anemia 8/14/2019    Vitamin B12 (8/14/2019) = 208    Vitamin D deficiency 8/19/2019    Vitamin D 25-Hydroxy (8/19/2019) = 16.2       Patient taking anticoagulants yes Heparin, ASA   Patient has a defibrillator: no     Assessment:   Changes in Assessment throughout shift: No     Patient has central line: no Reasons if yes: Insertion date: Last dressing date:   Patient has Boykin Cath: no Reasons if yes:     Insertion date:     Last Vitals:     Vitals:    08/22/19 2159 08/23/19 0635 08/23/19 0715 08/23/19 1452   BP: 162/70 190/78 152/73 159/70   Pulse: 69 77 75 72   Resp: 18  18 18   Temp: 97 °F (36.1 °C)  98.6 °F (37 °C) 98.7 °F (37.1 °C)   SpO2: 96%  94% 95%   Weight:       Height:            PAIN    Pain Assessment    Pain Intensity 1: 0 (08/23/19 1215) Pain Intensity 1: 2 (12/29/14 1105)    Pain Location 1: Shoulder Pain Location 1: Abdomen    Pain Intervention(s) 1: Medication (see MAR) Pain Intervention(s) 1: Medication (see MAR)  Patient Stated Pain Goal: 0 Patient Stated Pain Goal: 0  o Intervention effective: no    o Other actions taken for pain:      Skin Assessment  Skin color Skin Color: Appropriate for ethnicity  Condition/Temperature Skin Condition/Temp: Dry, Warm  Integrity Skin Integrity: Wound (add Wound LDA), Tear  Turgor Turgor: Epidermis thin w/ loss of subcut tissue  Weekly Pressure Ulcer Documentation  Pressure  Injury Documentation: No Pressure Injury Noted-Pressure Ulcer Prevention Initiated  Wound Prevention & Protection Methods  Orientation of wound Orientation of Wound Prevention: Posterior  Location of Prevention Location of Wound Prevention: Sacrum/Coccyx  Dressing Present Dressing Present : No  Dressing Status Dressing Status: Intact  Wound Offloading Wound Offloading (Prevention Methods): Bed, pressure reduction mattress     INTAKE/OUPUT  Date 08/22/19 0700 - 08/23/19 0659 08/23/19 0700 - 08/24/19 0659   Shift 0700-1859 1900-0659 24 Hour Total 3525-7237 0741-8507 24 Hour Total   INTAKE   P.O. 720  720 480  480     P. O. 720  720 480  480   Shift Total(mL/kg) 720(7.1)  720(7.1) 480(4.7)  480(4.7)   OUTPUT   Urine(mL/kg/hr) 200(0.2) 470(0.4) 670(0.3)        Urine Voided 200 470 670        Urine Occurrence(s) 4 x 2 x 6 x 3 x  3 x   Stool           Stool Occurrence(s) 1 x 0 x 1 x 1 x  1 x   Shift Total(mL/kg) 200(2) 470(4.6) 670(6.6)       -470 50 480  480   Weight (kg) 102 102 102 102 102 102       Recommendations:  1. Patient needs and requests:     2. Diet: Cardiac Mech Soft Nectar Thick     3. Pending tests/procedures:      4. Functional Level/Equipment:     5. Estimated Discharge Date: 8/24/19 Posted on Whiteboard in Patients Room: yes     HEALS Safety Check    A safety check occurred in the patient's room between off going nurse and oncoming nurse listed above. The safety check included the below items  Area Items   H  High Alert Medications - Verify all high alert medication drips (heparin, PCA, etc.)   E  Equipment - Suction is set up for ALL patients (with diann)  - Red plugs utilized for all equipment (IV pumps, etc.)  - WOWs wiped down at end of shift.  - Room stocked with oxygen, suction, and other unit-specific supplies   A  Alarms - Bed alarm is set for fall risk patients  - Ensure chair alarm is in place and activated if patient is up in a chair   L  Lines - Check IV for any infiltration  - Boykin bag is empty if patient has a Boykin   - Tubing and IV bags are labeled   S  Safety   - Room is clean, patient is clean, and equipment is clean. - Hallways are clear from equipment besides carts.    - Fall bracelet on for fall risk patients  - Ensure room is clear and free of clutter  - Suction is set up for ALL patients (with diann)  - Hallways are clear from equipment besides carts.    - Isolation precautions followed, supplies available outside room, sign posted

## 2019-08-23 NOTE — PROGRESS NOTES
Problem: Dysphagia (Adult)  Goal: *Speech Goal: (INSERT TEXT)  Description  Long term goals  Patient will:  1. Tolerate regular diet with thin liquids using safe swallowing techniques without s/s of aspiration in 5/6 meals. 2. Use safe swallowing techniques (including slowed rate, small bites/sips, alternate liquids/solids, effortful swallow, head rotation to right for liquids) with supervision. 3. Participate in MBS study prior to advancing to thin liquids to assure safety (no aspiration). 4. Perform oral and pharyngeal strengthening exercises (to improve speech and swallowing) with supervision-modified independence. 5. Demonstrate 90% intelligibility in conversation using appropriate speaking strategies (slowed rate, overarticulation, increased volume). 6. Recall 3 words after 5 minutes with supervision. 7. Perform functional problem solving/reasoning tasks with 90% accuracy. 8. Name 8-10 items in categories of increasing abstraction, supervision. Short term goals (by 8/30/19)  Patient will:   1. Tolerate soft diet with nectar thick liquids using safe swallowing techniques without s/s of aspiration in 5/6 meals. 2. Use safe swallowing techniques (including slowed rate, small bites/sips, alternate liquids/solids, effortful swallow, head rotation to right for liquids) with min cues. 3. Tolerate small amounts of ice chips and water with the SLP using head rotation to the right and/or chin tuck, without overt s/s of aspiration. 4. Perform oral and pharyngeal strengthening exercises (to improve speech and swallowing) with min assist.  5. Demonstrate 90% intelligibility in polysyllabic words and sentences using appropriate speaking strategies (slowed rate, overarticulation, increased volume). 6. Recall 3 words after 5 minutes with mod assist.  7. Perform functional problem solving/reasoning tasks with 75-85% accuracy. 8. Name 8-10 items in concrete categories, supervision.       Note:   SPEECH LANGUAGE PATHOLOGY TREATMENT    Patient: Louetta Halsted (89 y.o. male)  Date: 8/23/2019  Diagnosis: Acute ischemic stroke [facility]) [I63.9] Acute ischemic stroke ([address]       Time in: 0900 1030 1230 1400  Time Out:  0930 1100 1300 1430  SUBJECTIVE:   Patient stated Will I be able to drink water when I leave here? . OBJECTIVE:   Mental Status:  Mr. Jesusita Dumont was awake and alert throughout treatment sessions today. Treatment & Interventions:   Patient was seen in the dining room today at mealtime for breakfast and lunch. He continues to receive an advanced soft diet with nectar thick liquids. Mr. Jesusita Dumont continues to need moderate cuing for use of safe swallowing techniques. His wife joins him and lunchtime and alternates with the SLP in providing cues for safe swallowing to prevent him from getting angry with the SLP. Patient appeared to tolerate breakfast well, but did have throat clearing drinking and the end of is meal at lunch. Wife is knowledgeable in trained strategies and will  him when present at meals this weekend. In CVA Support Group this morning, patient watched a video of professionals and stroke survivors discussing the symptoms, etiology, and prevention of stroke. Patient was attentive throughout the presentation, and later had appropriate questions and comments regarding its content. Mr. Jesusita Dumont was seen for a thirty minute speech therapy session in the SLP's office this afternoon. The following treatment tasks were presented: Motor Speech/Dysphagia:   Review of laryngeal ex: Supervision  Vowel prolongations:  11-15 seconds with cues for efficient initiation of voicing  Pitch glides:   Very good range  Production of strong /k/: 80% accuracy  Trials with ice chips:  Good tolerance using head rotation to right  Trials with water (5cc):  Wet voice and eventual cough (after independent sips from a cup)    Neuro-Linguistics:   Orientation:   Independent  Recent memory:  Supervision    Response & Tolerance to Activities:  Mr. Christeen Favre is cooperative in therapy, but is having difficulty implementing the safe swallowing techniques. He continues to need mod cues at mealtime. Pain:  Pain Scale 1: Numeric (0 - 10)  No report of pain     After treatment:   ?       Patient left in no apparent distress sitting up in chair  ? Patient left in no apparent distress in bed  ? Call bell left within reach  ? Nursing notified  ? Caregiver present  ? Bed alarm activated    ASSESSMENT:   Progression toward goals:  ?       Improving appropriately and progressing toward goals  ? Improving slowly and progressing toward goals  ? Not making progress toward goals and plan of care will be adjusted    PLAN:   Patient continues to benefit from skilled intervention to address the above impairments. Continue treatment per established plan of care.   Discharge Recommendations:  [facility]    Estimated LOS: Through 9/6/19    SILVERIO Hull  Time Calculation:  120 Minutes

## 2019-08-23 NOTE — PROGRESS NOTES
Problem: Self Care Deficits Care Plan (Adult)  Goal: *Therapy Goal (Edit Goal, Insert Text)  Description  Occupational Therapy Goals   Long Term Goals  Initiated 8/16/2019 and to be accomplished within 4 week(s) (9/6/19)  1. Pt will perform self-feeding with MI.  2. Pt will perform grooming with Set-up. 3. Pt will perform UB bathing with SBA. 4. Pt will perform LB bathing with Luba/CGA. 5. Pt will perform tub/shower transfer with Luba/CGA using LRAD. 6. Pt will perform UB dressing with Set-up. 7. Pt will perform LB dressing with Luba/CGA. 8. Pt will perform toileting task with Luba/CGA. 9. Pt will perform toilet transfer with Luba/CGA using LRAD. Short Term Goals   Initiated 8/16/2019 and to be accomplished within 7 day(s) (Updated 8/23, to be met by 8/30/19)  1. Pt will perform self-feeding with S; using compensatory strategies for right hand incorporation into set-up. (GM)  2. Pt will perform simple grooming task, including denture mgmt, with Luba and compensatory strategies as needed. (GM; washing face- pt reporting spouse previously assisted with denture mgmt)  3. Pt will perform UB bathing with Luba using AE as needed. (GM)  4. Pt will perform LB bathing with ModA using AE and compensatory strategies as needed. (GM)  5. Pt will perform tub/shower transfer with modA x 1 <>TTB. (GM)  6. Pt will perform UB dressing with CGA. (Cont for consistency)  7. Pt will perform LB dressing with ModA using AE as needed. (GM)  8. Pt will perform toileting task with ModA. (GM)  9. Pt will perform toilet transfer with ModA x 1 <>3:1 commode. (GM)      Outcome: Progressing Towards Goal   OT WEEKLY PROGRESS NOTE  Patient Name:Deon Rios   Time Spent With Patient  Time In: 0800  Time Out: 9927  Patient Seen For[de-identified] AM;ADLs    Medical Diagnosis:  Acute ischemic stroke [facility]) [I63.9] Acute ischemic stroke ([facility] [address]     Pain at start of tx: 0/10 pain or discomfort. Pain at stop of tx: 0/10 pain or discomfort.     Patient identified with name and : yes   Subjective: \"Why do you think I still can't move my [right] hand? I feel like I should be able to by now. \"          Objective: Pt approached for AM ADL with pt finishing toileting on commode, pt agreeable. Pt performing sit>stand with grab bar asst, and once in stand pt set-up to perform buttocks hygiene with Michael stabilization. OT providing thoroughness swipe for hygiene. Pt performing 3:1>w/c>TTB stand pivot transfers with modA overall. Pt performing shower this Am. Pt educated on compensatory/karthik techniques for incorporation of right UE into bathing as well as techniques to incr (I) with pt demo'ing fair carryover, requiring min-mod tactile/verbal cues for success. Outcome Measures: FIM     AROM: WFL Left UE, Impaired right UE with 1/5 MMT in proximal shldr musculature       COGNITION/PERCEPTION Initial Assessment Weekly Progress Assessment 2019   Premorbid Reading Status Literate  Literate   Premorbid Writing Status [facility]   Arousal/Alertness Generalized responses  Generalized responses    Orientation Level Oriented X4 Oriented X4   Visual Fields (WFL visual tracking and saccades)  (L visual tracking and saccades)   Praxis Impaired(Right UE 2/2 to hemiparesis )  Impaired(Right UE 2/2 to hemiparesis )   Body Scheme Cues to attend to right side of body, Cues to maintain midline in sitting, Cues to maintain midline in standing, Tactile  Cues to attend to right side during sitting/standing during functional tasks.     COMPREHENSION MODE Initial Assessment Weekly Progress Assessment 2019   Primary Mode of Comprehension Auditory  Auditory   Hearing Aide       Corrective Lenses Reading glasses  Reading Glasses   Score 4  5     EXPRESSION Initial Assessment Weekly Progress Assessment 2019   Primary Mode of Expression Verbal Verbal   Score 4 4   Comments Pt requiring increased time and repetitions to make needs known 2/2 to facial droop impeding expression SOCIAL INTERACTION/ PRAGMATICS Initial Assessment Weekly Progress Assessment 8/23/2019   Score 4 5   Comments Pt interacting appropriately with OT       PROBLEM SOLVING Initial Assessment Weekly Progress Assessment 8/23/2019   Score 4 4   Comments Pt requiring Michael for problem solving body mechanics prior to toileting transfer       MEMORY Initial Assessment Weekly Progress Assessment 8/23/2019   Score 4 4   Comments         EATING Initial Assessment Weekly Progress Assessment 8/23/2019   Functional Level 5 Feeding/Eating  Feeding/Eating Assistance: 5 (Supervision/setup)  Comments: Set-up of containers    Comments Pt reporting requiring set-up of container mgmt for self feeding. Pt able to use dominant left hand to manage utensils. GROOMING Initial Assessment Weekly Progress Assessment 8/23/2019   Functional Level 3 Grooming  Grooming Assistance : 5 (Supervision)(Set-up)  Comments: Pt washed face in shower with set-up of materials. Tasks completed by patient Brushed teeth, Washed face Tasks Completed by Patient: Washed face   Comments Pt req modA for cleaning and managing dentures. Pt able to wash face with set-up. BATHING Initial Assessment Weekly Progress Assessment 8/23/2019   Functional Level 2 Functional Level: 3  Upper Body Bathing  Bathing Assistance, Upper: 4 (Minimal assistance)  Upper Body : Rafa technique training;Training to use affected extremity as a gross stabilizer  Position Performed: Seated in chair  Adaptive Equipment: Long handled sponge; Tub bench  Comments: Pt educated on placing right UE on knee and forward flexing to reach right axilla. Pt required (A) with washing left arm and thoroughness for back after use of LH sponge. Pt educated on placing wash cloth on left knee and bending forward/back to wash left hand and forearm with good carryover.    Lower Body Bathing  Bathing Assistance, Lower : 4 (Minimal assistance)  Adaptive Equipment: Grab bar;Long handled sponge  Position Performed: Bending forward method;Seated in chair;Standing  Comments: Pt bending forward to wash (B) ankles, using LH sponge to reach feet. Pt washing martha region I'ly. Pt using bending forward method and hvaing OT (A) to wash buttocks and posterior thighs 2/2 to pt unable to reach area with cues to laterally weight shift. Body parts patient bathed Abdomen, Arm, right, Buttocks, Chest, Martha area, Thigh, left, Thigh, right Body Parts Patient Bathed: Abdomen;Arm, right;Chest;Lower leg and foot, left; Lower leg and foot, right;Martha area; Thigh, left; Thigh, right   Comments UB: modA LB: MaxA overall Comments: UB: Michael LB: modA     TUB/SHOWER TRANSFER INDEPENDENCE Initial Assessment Weekly Progress Assessment 8/23/2019   Score 0 Functional Transfers  Toilet Transfer : Stand pivot transfer without device  Amount of Assistance Required: 3 (Moderate assistance)  Tub or Shower Type: Shower  Amount of Assistance Required: 3 (Moderate assistance)  Adaptive Equipment: Bedside commode;Grab bars; Tub transfer bench; Wheelchair   Comments NT 2/2 to safety and decreased functional transfer (I). Bathing performed at sink this AM Comments: W/C<>TTB     UPPER BODY DRESSING/UNDRESSING Initial Assessment Weekly Progress Assessment 8/23/2019   Functional Level 4 Upper Body Dressing   Dressing Assistance : 4 (Minimal assistance)(CGA)  Comments: Pt doffed shirt with Erica, pt required set-up and CGA during UB donning 2/2 to shirt material confusion. Items applied/Steps completed Pullover (4 steps)     Comments Pt educated on karthik technique to cecily shirt with Michael overall. Pt doffed shirt with SBA.         LOWER BODY DRESSING/UNDRESSING Initial Assessment Weekly Progress Assessment 8/23/2019   Functional Level 2 Lower Body Dressing   Dressing Assistance : 4 (Minimal assistance)  Leg Crossed Method Used: No  Position Performed: Bending forward method;Seated in chair;Standing  Adaptive Equipment Used: Reacher;Sock aid  Comments: Pt doffed shorts during toileting with CGA; pt doffed socks with reacher with Michael. Pt donned socks with sock aide with Michael and incr time. Pt donned pants with use of reacher and karthik technique with Michael seated, and Michael in stand for steadying balance. Items applied/Steps completed Elastic waist pants (3 steps), Sock, left (1 step), Sock, right (1 step), Underpants (3 steps) Items Applied/Steps Completed: Elastic waist pants (3 steps); Sock, left (1 step); Sock, right (1 step)   Comments Pt doffed socks with modA, donned socks with TA. LB clothing score reflected from pt performing toileting task, requiring maxA for clothing mgmt        TOILETING Initial Assessment Weekly Progress Assessment 8/23/2019   Functional Level 2 Toileting  Toileting Assistance (FIM Score): 4 (Minimal assistance)(Michael buttocks cleansing, [facility] urinal)  Cues: Tactile cues provided;Verbal cues provided  Adaptive Equipment: Elevated seat;Grab bars   Comments Pt able to initiate to wipe but requiring MaxA for thoroughness. Pt required maxA overall for clothing mgmt with pt initiating to assist. Pt encouraged to focus on steadying balance in standing with additional person present assisting for clothing and buttocks cleansing. TOILET TRANSFER INDEPENDENCE Initial Assessment Weekly Progress Assessment 8/23/2019   Transfer score 1 Functional Transfers  Toilet Transfer : Stand pivot transfer without device  Amount of Assistance Required: 3 (Moderate assistance)  Tub or Shower Type: Shower  Amount of Assistance Required: 3 (Moderate assistance)  Adaptive Equipment: Bedside commode;Grab bars; Tub transfer bench; Wheelchair   Comments modA x 2. Second staff member present for safety and (A) with initial sit<>stand. Comments: W/C<>bariatric commode              ASSESSMENT:  Pt demo'ing good progress towards OT POC as evidenced by meeting 8/9x STGs, progressing towards LTGs. Pt increasing LB dressing and functional transfer (I).  Pt continues to benefit from cues to engage in rest breaks and slowing down pace for increased safety and energy conservation. Pt continues to be motivated to return to PLOF, at times requiring re assurance and encouragement of progression secondary to pt being hard on himself (ie wanting his arm to fully function, variable understanding of proximal to distal return). Pt demo'ing no muscular fasciculations during session. Progression toward goals:  ?          Improving appropriately and progressing toward goals  ? Improving slowly and progressing toward goals  ? Not making progress toward goals and plan of care will be adjusted     PLAN:  Patient continues to benefit from skilled intervention to address the above impairments. Continue treatment per established plan of care. Discharge Recommendations:  [facility]  Further Equipment Recommendations for Discharge:  TBD next level of care (currently recommending bariatric 3:1 and TTB)     Please refer to the flow sheet for vital signs taken during this treatment. After treatment:   ?  Patient left in no apparent distress sitting up in wheel chair in dining room with SLP present    COMMUNICATION/EDUCATION:   ? Home safety education was provided and the patient/caregiver indicated understanding. ? Patient/family have participated as able in goal setting and plan of care. ? Patient/family agree to work toward stated goals and plan of care. ? Patient understands intent and goals of therapy, but is neutral about his/her participation. ? Patient is unable to participate in goal setting and plan of care. Plan of Care: Please see Care Plan for updated STG/LTGs.    Family Training:  NA due to SNF d/c location   Estimated LOS: 9/6/2019    [address] [place] [address], OT  8/23/2019

## 2019-08-23 NOTE — PROGRESS NOTES
Adriane spoke with pt and spouse who state that they are open to exploring SNF at [facility]. Wife has called Guerline Singh in admissions and shared her desire for the pt to move the [facility], noting that the spouse's mother is already at the location and pleased with care. Sw placed referral in epic. Sw will follow.

## 2019-08-23 NOTE — PROGRESS NOTES
Problem: Mobility Impaired (Adult and Pediatric)  Goal: *Acute Goals and Plan of Care (Insert Text)  Description  Physical Therapy Short Term Goals  Initiated 2019 and to be accomplished within 14 day(s)  1. Patient will move from supine to sit and sit to supine  in bed with minimal assistance/contact guard assist.     2.  Patient will transfer from bed to chair and chair to bed with minimal/moderate assistance using the least restrictive device. 3.  Patient will perform sit to stand with minimal assistance/moderate assistance. 4.  Patient will ambulate with moderate assistance  for 10 feet with the least restrictive device. Physical Therapy Goals  Initiated 2019 and to be accomplished within 4 weeks  1. Patient will move from supine to sit and sit to supine  in bed with supervision/set-up. 2.  Patient will transfer from bed to chair and chair to bed with supervision/set-up using the least restrictive device. 3.  Patient will perform sit to stand with supervision/set-up. 4.  Patient will ambulate with minimal assistance for 50 feet with the least restrictive device. 5.  Patient will ascend/descend 2 stairs with 1 handrail(s) with moderate assistance . 2019 1417 by Darlene Sherwood PTA  Outcome: Progressing Towards Goal     PHYSICAL THERAPY TREATMENT    Patient: Kamla Nelson (37 y.o. male)  Date: 2019  Diagnosis: Acute ischemic stroke [facility]) [I63.9] Acute ischemic stroke [facility])  Precautions: Aspiration, NWB  Chart, physical therapy assessment, plan of care and goals were reviewed. Time In: 1330  Time Out: 1400  Patient Seen For: PM;Therapeutic exercise;Transfer training  Pain:  Pt pain was reported as  0/10  pre-treatment. Pt pain was reported as 0/10  post-treatment. Intervention: N/A     Patient identified with name and : Yes     SUBJECTIVE:     Pt states \"That was better than before. \" when referencing stand step transfer to EOM.       OBJECTIVE DATA SUMMARY: Objective: Pt presents with less edema around R lateral malleolus. Pt instructed and assisted in the following activities to increase ease of transition to next level of care. BED/MAT MOBILITY Daily Assessment    Supine to Sit : 4 (Contact guard assistance)(CGA overall, min A for trunk)  Sit to Supine : 5 (Supervision)(SUPV overall, CGA for LEs)    Pt initiated movements for safe sequencing during mat mobility without cuing. Pt cont to actively lift and adduct R LE to place on top of L LE for supine>sit. TRANSFERS Daily Assessment    Other: Stand step without device  Transfer Assistance : 4 (Minimal assistance)  Sit to Stand Assistance: Minimal assistance    Max cuing for slowing down for safe sequencing. Good carryover after cuing. BALANCE Daily Assessment    Sitting - Static: Good (unsupported)  Sitting - Dynamic: Fair (occasional)(+)  Standing - Static: Fair  Standing - Dynamic : Impaired       THERAPEUTIC EXERCISES Daily Assessment    Extremity: Both  Exercise Type #1: Supine lower extremity strengthening  Sets Performed: 3  Reps Performed: 10  Level of Assist: Contact guard assistance    Pt instructed in supine LE strengthening bilaterally 3x10:   Quad set  Glute set  Bridging  LTR (for improved trunk control)  Hip abd  Hip add, hooklying with ball  SAQ    Improved muscle activation noted overall with decreased assistance. Pt able to actively lift/slide R LE into hooklying position. ASSESSMENT:  Improved muscle activation noted overall with decreased assistance during TherEx. Pt able to actively lift/slide R LE into hooklying position. Max cuing for slowing down for safe sequencing during transfers with good carryover. Pt seen with improved initiation of movements for safe sequencing throughout functional mobility. Progression toward goals:  ?      Improving appropriately and progressing toward goals  ? Improving slowly and progressing toward goals  ?       Not making progress toward goals and plan of care will be adjusted     PLAN:  Patient continues to benefit from skilled intervention to address the above impairments. Continue treatment per established plan of care. Discharge Recommendations:  [facility] vs Home Health, pending progress  Further Equipment Recommendations for Discharge:  TBD, pending progress      Estimated LOS: 4 weeks     Activity Tolerance:   Fair +   Please refer to the flowsheet for vital signs taken during this treatment. After treatment:   Patient left in W/C with wife pushing him back to his room.        Sima Puentes, PTA  8/23/2019

## 2019-08-23 NOTE — PROGRESS NOTES
[facility]  [address] [place] [address], [address]     INPATIENT REHABILITATION  DAILY PROGRESS NOTE     Date: 8/23/2019    Name: Daniella Lopez Age / Sex: 71 y.o. / male   CSN: [id number] MRN: [id number]   [facility] Date: 8/15/2019 Length of Stay: 8 days     Primary Rehab Diagnosis: Impaired Mobility and ADLs secondary to Acute Ischemic Stroke (acute/early subacute infarct at the left posterior basal ganglia to corona radiata) with residual right hemiparesis, dysphagia and dysarthria      Subjective:     Patient seen and examined. Blood pressure better controlled. Blood glucose controlled. Patient's Complaint:   No significant medical complaints    Pain Control: no current joint or muscle symptoms, essentially pain-free      Objective:     Vital Signs:  Patient Vitals for the past 24 hrs:   BP Temp Pulse Resp SpO2   08/23/19 0715 152/73 98.6 °F (37 °C) 75 18 94 %   08/23/19 0635 190/78 -- 77 -- --   08/22/19 2159 162/70 97 °F (36.1 °C) 69 18 96 %   08/22/19 2132 162/70 97 °F (36.1 °C) 69 18 96 %   08/22/19 1549 150/71 97.8 °F (36.6 °C) 70 19 97 %   08/22/19 1325 139/71 -- 66 -- --        Physical Examination:  GENERAL SURVEY: Patient is awake, alert, oriented x 3, sitting comfortably on the chair, not in acute respiratory distress. HEENT: pink palpebral conjunctivae, anicteric sclerae, no nasoaural discharge, moist oral mucosa  NECK: supple, no jugular venous distention, no palpable lymph nodes  CHEST/LUNGS: symmetrical chest expansion, good air entry, clear breath sounds  HEART: adynamic precordium, good S1 S2, no S3, regular rhythm, no murmurs  ABDOMEN: obese, bowel sounds appreciated, soft, non-tender  EXTREMITIES: pink nailbeds, no edema except for right ankle swelling, full and equal pulses, no calf tenderness   NEUROLOGICAL EXAM: The patient is awake, alert and oriented x3, able to answer questions fairly appropriately, able to follow 1 and 2 step commands.   Able to tell time from the wall clock. Cranial nerves II-XII are grossly intact except for slightly shallow right nasolabial fold, dysarthria and dysphagia. No gross sensory deficit. Motor strength is 5/5 on the LUE and LLE, 0/5 on the RUE (except for 1/5 on the right shoulder), 2 to 2+/5 on RLE (except for 0/5 on the right ankle/foot).       Current Medications:  Current Facility-Administered Medications   Medication Dose Route Frequency    labetalol (NORMODYNE) tablet 600 mg  600 mg Oral Q12H    spironolactone (ALDACTONE) tablet 50 mg  50 mg Oral BID    baclofen (LIORESAL) tablet 5 mg  5 mg Oral TID    ferrous sulfate tablet 325 mg  1 Tab Oral DAILY WITH BREAKFAST    ascorbic acid (vitamin C) (VITAMIN C) tablet 250 mg  250 mg Oral DAILY WITH BREAKFAST    cyanocobalamin tablet 1,000 mcg  1,000 mcg Oral DAILY    cholecalciferol (VITAMIN D3) capsule 5,000 Units  5,000 Units Oral DAILY    isosorbide dinitrate (ISORDIL) tablet 30 mg  30 mg Oral Q8H    hydrALAZINE (APRESOLINE) tablet 25 mg  25 mg Oral TID PRN    acetaminophen (TYLENOL) tablet 650 mg  650 mg Oral Q4H PRN    bisacodyl (DULCOLAX) tablet 10 mg  10 mg Oral Q48H PRN    heparin (porcine) injection 5,000 Units  5,000 Units SubCUTAneous Q8H    insulin lispro (HUMALOG) injection   SubCUTAneous TIDAC    magnesium oxide (MAG-OX) tablet 400 mg  400 mg Oral DAILY    aspirin chewable tablet 81 mg  81 mg Oral DAILY WITH BREAKFAST    amLODIPine (NORVASC) tablet 10 mg  10 mg Oral DAILY    atorvastatin (LIPITOR) tablet 80 mg  80 mg Oral QHS    hydrALAZINE (APRESOLINE) tablet 75 mg  75 mg Oral Q8H       Allergies:  No Known Allergies      Lab/Data Review:  Recent Results (from the past 24 hour(s))   GLUCOSE, POC    Collection Time: 08/22/19  4:53 PM   Result Value Ref Range    Glucose (POC) 149 (H) 70 - 110 mg/dL   GLUCOSE, POC    Collection Time: 08/22/19  8:29 PM   Result Value Ref Range    Glucose (POC) 140 (H) 70 - 110 mg/dL   GLUCOSE, POC    Collection Time: 08/23/19  7:14 AM   Result Value Ref Range    Glucose (POC) 114 (H) 70 - 110 mg/dL   GLUCOSE, POC    Collection Time: 08/23/19 11:33 AM   Result Value Ref Range    Glucose (POC) 137 (H) 70 - 110 mg/dL       Estimated Glomerular Filtration Rate:  On admission, estimated GFR based on a Creatinine of 1.20 mg/dl:              Using CKD-EPI = 63.1 mL/min/1.73m2              Using MDRD = 64.6 mL/min/1.73m2  Most recent estimated GFR, based on a Creatinine of 1.33 mg/dl on 8/22/2019:   Using CKD-EPI = 55.7 mL/min/1.73m2   Using MDRD = 57.4 mL/min/1.73m2       Assessment:     Primary Rehabilitation Diagnosis  1. Impaired Mobility and ADLs  2. Acute Ischemic Stroke (acute/early subacute infarct at the left posterior basal ganglia to corona radiata) with residual right hemiparesis, dysphagia and dysarthria     Comorbidities   Obesity, Class I, BMI 30-34.9 E66.9    Obstructive sleep apnea on CPAP G47.33, Z99.89    Hypertensive heart and kidney disease without heart failure and with stage 2 chronic kidney disease I13.10, N18.2    Chronic hypokalemia E87.6    CKD (chronic kidney disease) stage 2, GFR 60-89 ml/min N18.2    Current use of aspirin Z79.82    On statin therapy due to risk of future cardiovascular event Z79.899    Type 2 diabetes mellitus with stage 2 chronic kidney disease  E11.22, N18.2    Pure hypercholesterolemia E78.00    Elevated prostate specific antigen (PSA) R97.20    Refusal of statin medication by patient Z48.34    First degree atrioventricular block by electrocardiogram I44.0    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 20X0    Leukocytosis D72.829    Iron deficiency anemia D50.9    Vitamin B12 anemia D51.9    Vitamin D deficiency E55. 9        Plan:     1. Justification for continued stay: Good progression towards established rehabilitation goals.     2. Medical Issues being followed closely:    [x]  Fall and safety precautions     []  Wound Care     [x]  Bowel and Bladder Function [x]  Fluid Electrolyte and Nutrition Balance     []  Pain Control      3. Issues that 24 hour rehabilitation nursing is following:    [x]  Fall and safety precautions     []  Wound Care     [x]  Bowel and Bladder Function     [x]  Fluid Electrolyte and Nutrition Balance     []  Pain Control      [x]  Assistance with and education on in-room safety with transfers to and from the bed, wheelchair, toilet and shower. 4. Acute rehabilitation plan of care:    [x]  Continue current care and rehab. [x]  Physical Therapy           [x]  Occupational Therapy           [x]  Speech Therapy     []  Hold Rehab until further notice     5. Medications:    [x]  MAR Reviewed     [x]  Continue Present Medications     6. DVT Prophylaxis:      []  Lovenox     [x]  Unfractionated Heparin     []  Coumadin     []  NOAC     []  SUZAN Stockings     []  Sequential Compression Device     []  None     7.  Orders:   > Acute Ischemic Stroke (acute/early subacute infarct at the left posterior basal ganglia to corona radiata) with residual right hemiparesis, dysphagia and dysarthria; S/P Administration of intravenous tPA (8/12/2019 - [address])   > On 8/22/2019, started Baclofen 5 mg PO TID   > Continue:    > Aspirin 81 mg PO once daily with breakfast    > Atorvastatin 80 mg PO q HS    > Baclofen 5 mg PO TID    > Chronic hypokalemia    Serum Potassium during hospital stay at [address]      08/15/19  0330 08/14/19  1848 08/14/19  0342 08/13/19  1205 08/12/19  2328   K 3.0* 2.8* 2.7* 2.8* 2.4*          > K (8/16/2019, on admission) = 3.8   > Mg (8/16/2019, on admission) = 1.9   > Upon admission to the ARU, patient was given Klor Con 40 meq PO BID with meals x 2 days   > K (8/17/2019) = 3.5   > K (8/18/2019) = 3.7   > K (8/19/2019) = 3.5   > K (8/22/2019) = 3.6   > Mg (8/22/2019) = 2.2   > On 8/22/2019, started Spironolactone 50 mg PO BID   > Continue:    > Spironolactone 50 mg PO BID    > Mg(OH)2 400 mg PO once daily    > Hypertensive heart and kidney disease without heart failure and with stage 2 chronic kidney disease   > Upon admission to the ARU, increased Hydralazine from 50 mg to 75 mg PO q 8 hr (6AM, 2PM, 10PM)   > Once work-up for primary hyperaldosteronism is complete, plan to start Spironolactone 25 mg PO once daily   > On 8/16/2019:    > Discontinued Metoprolol succinate 50 mg PO once daily (6AM)    > Started:     > Labetalol 200 mg PO q 12 hr (9AM, 9PM)     > Isosorbide dinitrate 10 mg P) q 8 hr (6AM, 2PM, 10PM)   > On 8/18/2019, added Hydralazine 25 mg PO TID PRN for SBP greater than 170 mmHg   > On 8/19/2019:    > Increased Labetalol from 200 mg to 300 mg PO q 12 hr (9AM, 9PM)    > Increased Isosorbide dinitrate from 10 mg to 20 mg PO q 8 hr (6AM, 2PM, 10PM)   > On 8/20/2019:    > Increased Labetalol from 300 mg to 400 mg PO q 12 hr (9AM, 9PM)    > Increased Isosorbide dinitrate from 20 mg to 30 mg PO q 8 hr (6AM, 2PM, 10PM)   > On 8/22/2019:    > Increased Labetalol from 400 mg to 600 mg PO q 12 hr (9AM, 9PM)    > Started Spironolactone 50 mg PO BID   > Continue:    > Amlodipine 10 mg PO once daily (9AM)    > Hydralazine 75 mg PO q 8 hr (6AM, 2PM, 10PM)    > Labetalol 600 mg PO q 12 hr (9AM, 9PM)    > Isosorbide dinitrate 30 mg PO q 8 hr (6AM, 2PM, 10PM)    > Spironolactone 50 mg PO BID    > Hydralazine 25 mg PO TID PRN for SBP greater than 170 mmHg    > Pure hypercholesterolemia   > Lipid profile (8/13/2019) showed , , HDL 42,    > Lipid profile (8/14/2019) showed , , HDL 39, LDL 94   > Continue Atorvastatin 80 mg PO q HS    > Type 2 diabetes mellitus with stage 2 chronic kidney disease   > HbA1c (4/16/2014) = 6.2   > Patient has had chronic kidney disease even before his diagnosis of Type 2 diabetes mellitus and is presumed to be due to prolonged uncontrolled hypertension   > HbA1c (8/13/2019) = 6.6   > Continue Humalog insulin sliding scale SC TID AC    >  ? Primary hyperaldosteronism as etiology of chronic hypokalemia and difficult to control hypertension   > Endocrinology consult (Dr. Zahra Singh) was called at [address] prior to discharge/transfer to the ARU   > Aldosterone/Renin activity (8/16/2019):    > Aldosterone = 8.8 (NORMAL)    > Renin Activity = 0.300 (NORMAL)    > Aldosterone/Renin Activity = 29 (ELEVATED)   > Endocrinology consult (Dr. Ju Pierce):    > ASSESSMENT:     1. Chronic hypokalemia. 2.  Diabetes mellitus. 3.  Diabetic nephropathy with a stage II chronic renal disease. 4.  Normocytic anemia. 5.  Vitamin B12 deficiency. 6.  Borderline iron deficiency. 7.  Hypoalbuminemia.     > DISCUSSION:  The patient may well have hyperaldosteronism, but he may have it on the basis of his nephropathy, which is due to his underlying diabetes. The cause of his anemia might be a combination of B12 deficiency and iron deficiency together, so investigation should be done for this.     > PLAN: Laboratory studies:     1.  24-hour urine for aldosteronism: PENDING     2. Methylmalonic acid level (8/18/2019) = 468 (ELEVATED)     3.  Gastrin level (8/20/2019) = <10 (NORMAL)     4. Microalbumin-to-creatinine ratio. 5.  Transferrin and prealbumin levels.     > FSH (8/19/2019) = 5.6 (NORMAL)    > LH (8/18/2019) = 6.8 (NORMAL)    > Free T4 (8/18/2019) = 1.0 (NORMAL)    > TSH (8/18/2019) = 4.63 (ELEVATED)    > Urine aldosterone (8/20/2019): PENDING    > Transferrin (8/22/2019) = 275   > On 8/22/2019, started Spironolactone 50 mg PO BID   > Continue Spironolactone 50 mg PO BID    > Leukocytosis, resolved   > WBC count (8/15/2019) = 11.7   > No fever documented since admission to the ARU   > WBC count (8/16/2019, on admission) = 17.9   > Work-up:    > Urinalysis (8/16/2019): WNL    > Chest x-ray (PA-Lateral) (8/16/2019) showed a minimally blunted left posterior costophrenic angle could be due to either a tiny effusion or chronic pleural reaction/scarring; mild cardiomegaly; atherosclerosis.    > WBC count (8/18/2019) = 11.6    > Iron deficiency anemia / Vitamin B12 anemia   > Anemia work-up (8/14/2019) showed serum iron 22, TIBC 371, iron % saturation 6, ferritin 34, reticulocyte count 1.4   > Vitamin B12 (8/14/2019) = 208   > Hgb/Hct (8/15/2019) = 10.1/31.3    > Hgb/Hct (8/16/2019, on admission) = 10.7/32.6   > Hgb/Hct (8/18/2019) = 8.7/27.4   > Hgb/Hct (8/19/2019) = 8.4/25.2   > On 8/19/2019, started:     > FeSO4 325 mg PO once daily with breakfast     > Ascorbic Acid 250 mg PO once daily with breakfast (to enhance the absorption of the FeSO4)     > Cyanocobalamin 1,000 mcg PO once daily    > Epoetin tone 10,000 units SC x 1 dose   > Hgb/Hct (8/22/2019) = 8.6/26.3   > On 8/22/2019, Epoetin tone 10,000 units SC x 1 dose     > Continue:    > FeSO4 325 mg PO once daily with breakfast     > Ascorbic Acid 250 mg PO once daily with breakfast (to enhance the absorption of the FeSO4)     > Cyanocobalamin 1,000 mcg PO once daily    > Vitamin D Deficiency   > PTH (8/18/2019) = 101.7   > Vitamin D 25-Hydroxy (8/19/2019) = 16.2   > On 8/19/2019, patient was given Cholecalciferol 50,000 units PO x 1 dose    > On 8/20/2019, started Cholecalciferol 5,000 units PO once daily   > Continue Cholecalciferol 5,000 units PO once daily    > Right ankle swelling, most likely dependent edema due to hemiparesis/lack of muscle contraction and osteoarthritis of the right ankle   > (+) nonpainful swelling of the right ankle for the past few days; denies any trauma   > X-ray of the right ankle (8/22/2019) showed no acute fracture or subluxation; advanced degenerative changes at the tibiotalar joint; old fracture fragment vs. accessory ossicle in the inferior aspect of the lateral malleolus; soft tissue swelling around the ankle.   > Elevate right leg/foot when supine in bed    > Diet:   > On 8/16/2019, solids consistency was advanced from Mechanical soft (NDD 2) to Soft solids (NDD 3)   > Specifications: Diabetic, low saturated fat   > Solids (consistency): Soft solids (NDD 3)   > Liquids (consistency): Mildly thick ([place]-thick)       8. Patient's progress in rehabilitation and medical issues discussed with the patient. All questions answered to the best of my ability. Care plan discussed with patient, wife and nurse.       Signed:    Mercedez Balderas MD    August 23, 2019

## 2019-08-23 NOTE — PROGRESS NOTES
Pt is a 71year old male admitted to ARU for CVA. Pt is alert and oriented with his wife in the room. Pt consents to conversation with wife present. Pt states that he lives with his wife in a 1 level home with 2 steps to enter and a tub shower. Pt states that he was independent in care prior to admission. Pt states that he has no history with DME, home health, outpatient therapy or SNF. Pt states that he does not have a POA and notes that his wife is his NOK contact, Tessa Arboleda ([phone] cell [phone] home). Pt states that he has Medicare and [employer] [facility] [employer]. Sw reviewed dc planning and team conference. Pt and spouse state understanding and deny questions. Sw will follow.

## 2019-08-24 LAB
ANION GAP SERPL CALC-SCNC: 7 MMOL/L (ref 3–18)
BUN SERPL-MCNC: 35 MG/DL (ref 7–18)
BUN/CREAT SERPL: 23 (ref 12–20)
CALCIUM SERPL-MCNC: 9 MG/DL (ref 8.5–10.1)
CHLORIDE SERPL-SCNC: 110 MMOL/L (ref 100–111)
CO2 SERPL-SCNC: 25 MMOL/L (ref 21–32)
CREAT SERPL-MCNC: 1.53 MG/DL (ref 0.6–1.3)
GLUCOSE BLD STRIP.AUTO-MCNC: 113 MG/DL (ref 70–110)
GLUCOSE BLD STRIP.AUTO-MCNC: 124 MG/DL (ref 70–110)
GLUCOSE BLD STRIP.AUTO-MCNC: 125 MG/DL (ref 70–110)
GLUCOSE BLD STRIP.AUTO-MCNC: 146 MG/DL (ref 70–110)
GLUCOSE SERPL-MCNC: 101 MG/DL (ref 74–99)
POTASSIUM SERPL-SCNC: 3.8 MMOL/L (ref 3.5–5.5)
SODIUM SERPL-SCNC: 142 MMOL/L (ref 136–145)

## 2019-08-24 PROCEDURE — 65310000000 HC RM PRIVATE REHAB

## 2019-08-24 PROCEDURE — 74011250637 HC RX REV CODE- 250/637: Performed by: INTERNAL MEDICINE

## 2019-08-24 PROCEDURE — 36415 COLL VENOUS BLD VENIPUNCTURE: CPT

## 2019-08-24 PROCEDURE — 74011250636 HC RX REV CODE- 250/636: Performed by: INTERNAL MEDICINE

## 2019-08-24 PROCEDURE — 82962 GLUCOSE BLOOD TEST: CPT

## 2019-08-24 PROCEDURE — 97116 GAIT TRAINING THERAPY: CPT

## 2019-08-24 PROCEDURE — 80048 BASIC METABOLIC PNL TOTAL CA: CPT

## 2019-08-24 PROCEDURE — 97110 THERAPEUTIC EXERCISES: CPT

## 2019-08-24 PROCEDURE — 92507 TX SP LANG VOICE COMM INDIV: CPT

## 2019-08-24 RX ADMIN — BACLOFEN 5 MG: 10 TABLET ORAL at 17:29

## 2019-08-24 RX ADMIN — CYANOCOBALAMIN TAB 1000 MCG 1000 MCG: 1000 TAB at 08:42

## 2019-08-24 RX ADMIN — HYDRALAZINE HYDROCHLORIDE 75 MG: 50 TABLET, FILM COATED ORAL at 06:34

## 2019-08-24 RX ADMIN — ATORVASTATIN CALCIUM 80 MG: 40 TABLET, FILM COATED ORAL at 21:24

## 2019-08-24 RX ADMIN — Medication 5000 UNITS: at 08:40

## 2019-08-24 RX ADMIN — Medication 250 MG: at 08:41

## 2019-08-24 RX ADMIN — BACLOFEN 5 MG: 10 TABLET ORAL at 13:01

## 2019-08-24 RX ADMIN — HYDRALAZINE HYDROCHLORIDE 75 MG: 50 TABLET, FILM COATED ORAL at 21:24

## 2019-08-24 RX ADMIN — BACLOFEN 5 MG: 10 TABLET ORAL at 06:34

## 2019-08-24 RX ADMIN — AMLODIPINE BESYLATE 10 MG: 10 TABLET ORAL at 08:40

## 2019-08-24 RX ADMIN — Medication 400 MG: at 08:41

## 2019-08-24 RX ADMIN — HEPARIN SODIUM 5000 UNITS: 5000 INJECTION INTRAVENOUS; SUBCUTANEOUS at 21:34

## 2019-08-24 RX ADMIN — ASPIRIN 81 MG 81 MG: 81 TABLET ORAL at 08:41

## 2019-08-24 RX ADMIN — LABETALOL HCL 600 MG: 200 TABLET, FILM COATED ORAL at 08:39

## 2019-08-24 RX ADMIN — HEPARIN SODIUM 5000 UNITS: 5000 INJECTION INTRAVENOUS; SUBCUTANEOUS at 14:04

## 2019-08-24 RX ADMIN — HEPARIN SODIUM 5000 UNITS: 5000 INJECTION INTRAVENOUS; SUBCUTANEOUS at 06:35

## 2019-08-24 RX ADMIN — LABETALOL HCL 600 MG: 200 TABLET, FILM COATED ORAL at 21:23

## 2019-08-24 RX ADMIN — SPIRONOLACTONE 50 MG: 25 TABLET ORAL at 08:38

## 2019-08-24 RX ADMIN — ISOSORBIDE DINITRATE 30 MG: 20 TABLET ORAL at 06:34

## 2019-08-24 RX ADMIN — FERROUS SULFATE TAB 325 MG (65 MG ELEMENTAL FE) 325 MG: 325 (65 FE) TAB at 08:40

## 2019-08-24 RX ADMIN — ISOSORBIDE DINITRATE 30 MG: 20 TABLET ORAL at 21:23

## 2019-08-24 RX ADMIN — HYDRALAZINE HYDROCHLORIDE 75 MG: 50 TABLET, FILM COATED ORAL at 14:09

## 2019-08-24 RX ADMIN — ISOSORBIDE DINITRATE 30 MG: 20 TABLET ORAL at 14:10

## 2019-08-24 RX ADMIN — SPIRONOLACTONE 50 MG: 25 TABLET ORAL at 21:23

## 2019-08-24 NOTE — ROUTINE PROCESS
SHIFT CHANGE NOTE FOR [facility]    Bedside and Verbal shift change report given to MAURO Santos (oncoming nurse) by Marci Jones RN   (offgoing nurse). Report included the following information SBAR, Kardex, MAR and Recent Results. Situation:   Code Status: Full Code   Reason for Admission: CVA  Hospital Day: 9   Problem List:   Hospital Problems  Date Reviewed: 8/23/2019          Codes Class Noted POA    Primary osteoarthritis of right ankle (Chronic) ICD-10-CM: M19.071  ICD-9-CM: 715.17  8/22/2019 Yes    Overview Signed 8/23/2019 11:54 AM by Rhonda Singleton MD     X-ray of the right ankle (8/22/2019) showed no acute fracture or subluxation; advanced degenerative changes at the tibiotalar joint; old fracture fragment vs. accessory ossicle in the inferior aspect of the lateral malleolus; soft tissue swelling around the ankle.              Vitamin D deficiency (Chronic) ICD-10-CM: E55.9  ICD-9-CM: 268.9  8/19/2019 Yes    Overview Signed 8/19/2019  1:34 PM by Rhonda Singleton MD     Vitamin D 25-Hydroxy (8/19/2019) = 16.2              Leukocytosis ICD-10-CM: W36.870  ICD-9-CM: 288.60  8/16/2019 Yes    Overview Signed 8/16/2019 11:19 AM by Rhonda Singleton MD     WBC count (8/16/2019) = 17.9             Hypertensive heart and kidney disease without heart failure and with stage 2 chronic kidney disease (Chronic) ICD-10-CM: I13.10, N18.2  ICD-9-CM: 404.90, 585.2  Unknown Yes        Chronic hypokalemia ICD-10-CM: E87.6  ICD-9-CM: 276.8  Unknown Yes    Overview Addendum 8/15/2019 11:31 AM by Rhonda Singleton MD     Persistent chronic hypokalemia + hypertension; ?primary hyperaldosteronism             Type 2 diabetes mellitus with stage 2 chronic kidney disease ([facility] [address] (Chronic) ICD-10-CM: E11.22, N18.2  ICD-9-CM: 250.40, 585.2  Unknown Yes    Overview Signed 8/15/2019 12:26 PM by Rhonda Singleton MD     HbA1c (8/13/2019) = 6.6             Pure hypercholesterolemia (Chronic) ICD-10-CM: E78.00  ICD-9-CM: 272.0  8/15/2019 Yes    Overview Addendum 8/15/2019  6:27 PM by Marcio Alva MD     Lipid profile (8/13/2019) showed , , HDL 42, ; Lipid profile (8/14/2019) showed , , HDL 39, LDL 94             Current use of aspirin ICD-10-CM: Z79.82  ICD-9-CM: V58.66  8/14/2019 Yes        On statin therapy due to risk of future cardiovascular event ICD-10-CM: Z79.899  ICD-9-CM: V58.69  8/14/2019 Yes    Overview Signed 8/15/2019 12:21 PM by Marcio Alva MD     On Atorvastatin             Vitamin B12 deficiency anemia (Chronic) ICD-10-CM: D51.9  ICD-9-CM: 281.1  8/14/2019 Yes    Overview Signed 8/19/2019  1:24 PM by Marcio Alva MD     Vitamin B12 (8/14/2019) = 208             Iron deficiency anemia (Chronic) ICD-10-CM: D50.9  ICD-9-CM: 280.9  8/14/2019 Yes    Overview Signed 8/19/2019  1:25 PM by Marcio Alva MD     Anemia work-up (8/14/2019) showed serum iron 22, TIBC 371, iron % saturation 6, ferritin 34, reticulocyte count 1.4               Refusal of statin medication by patient ICD-10-CM: Z53.29  ICD-9-CM: V64.2  8/13/2019 Yes    Overview Signed 8/15/2019  2:01 PM by Marcio Alva MD     As per note of Neurology (Dr. Jose Arreola), patient refuses statin agent because of potential side effects.               * (Principal) Acute ischemic stroke [facility]) ICD-10-CM: I63.9  ICD-9-CM: 434.91  8/12/2019 Yes    Overview Signed 8/15/2019 12:17 AM by Marcio Alva MD     Acute Ischemic Stroke (acute/early subacute infarct at the left posterior basal ganglia to corona radiata) with residual right hemiparesis, dysphagia and dysarthria             Impaired mobility and ADLs ICD-10-CM: Z74.09  ICD-9-CM: 799.89  8/12/2019 Yes        Received intravenous tissue plasminogen activator (tPA) in emergency department ICD-10-CM: Z92.82  ICD-9-CM: V45.88  8/12/2019 Yes        Right hemiparesis ([facility] [address] ICD-10-CM: G81.91  ICD-9-CM: 342.90  8/12/2019 Yes        Dysphagia ICD-10-CM: R13.10  ICD-9-CM: 787.20  8/12/2019 Yes        Dysarthria ICD-10-CM: R47.1  ICD-9-CM: 784.51  8/12/2019 Yes              Background:   Past Medical History:   Past Medical History:   Diagnosis Date    Acute ischemic stroke ([facility] [address] 8/12/2019    Acute Ischemic Stroke (acute/early subacute infarct at the left posterior basal ganglia to corona radiata) with residual right hemiparesis, dysphagia and dysarthria    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     On Allopurinol    Chronic hypokalemia     Persistent chronic hypokalemia + hypertension; ?primary hyperaldosteronism    CKD (chronic kidney disease) stage 2, GFR 60-89 ml/min 8/15/2019    Current use of aspirin 8/14/2019    Dysarthria 8/12/2019    Dysphagia 8/12/2019    Elevated prostate specific antigen (PSA) 7/21/2011    First degree atrioventricular block by electrocardiogram 8/12/2019    Hypertensive heart and kidney disease without heart failure and with stage 2 chronic kidney disease     Iron deficiency anemia 8/14/2019    Anemia work-up (8/14/2019) showed serum iron 22, TIBC 371, iron % saturation 6, ferritin 34, reticulocyte count 1.4     Obesity, Class I, BMI 30-34.9     Obstructive sleep apnea on CPAP     On statin therapy due to risk of future cardiovascular event 8/14/2019    On Atorvastatin    Primary osteoarthritis of right ankle 8/22/2019    X-ray of the right ankle (8/22/2019) showed no acute fracture or subluxation; advanced degenerative changes at the tibiotalar joint; old fracture fragment vs. accessory ossicle in the inferior aspect of the lateral malleolus; soft tissue swelling around the ankle.     Pure hypercholesterolemia 08/15/2019    Lipid profile (8/13/2019) showed , , HDL 42, ; Lipid profile (8/14/2019) showed , , HDL 39, LDL 94    Received intravenous tissue plasminogen activator (tPA) in emergency department 8/12/2019    Refusal of statin medication by patient 8/13/2019    As per note of Neurology (Dr. Shantal Purcell), patient refuses statin agent because of potential side effects.  Right hemiparesis ([address] 8/12/2019    Type 2 diabetes mellitus with stage 2 chronic kidney disease (HCC)     HbA1c (8/13/2019) = 6.6    Vitamin B12 deficiency anemia 8/14/2019    Vitamin B12 (8/14/2019) = 208    Vitamin D deficiency 8/19/2019    Vitamin D 25-Hydroxy (8/19/2019) = 16.2       Patient taking anticoagulants yes Heparin, ASA   Patient has a defibrillator: no     Assessment:   Changes in Assessment throughout shift: No     Patient has central line: no Reasons if yes: Insertion date: Last dressing date:   Patient has Boykin Cath: no Reasons if yes:     Insertion date:     Last Vitals:     Vitals:    08/23/19 0715 08/23/19 1452 08/23/19 2108 08/24/19 0634   BP: 152/73 159/70 130/60 170/73   Pulse: 75 72 69 66   Resp: 18 18 18    Temp: 98.6 °F (37 °C) 98.7 °F (37.1 °C) 97 °F (36.1 °C)    SpO2: 94% 95% 100%    Weight:       Height:            PAIN    Pain Assessment    Pain Intensity 1: 0 (08/24/19 0400) Pain Intensity 1: 2 (12/29/14 1105)    Pain Location 1: Shoulder Pain Location 1: Abdomen    Pain Intervention(s) 1: Medication (see MAR) Pain Intervention(s) 1: Medication (see MAR)  Patient Stated Pain Goal: 0 Patient Stated Pain Goal: 0  o Intervention effective: no    o Other actions taken for pain:      Skin Assessment  Skin color Skin Color: Appropriate for ethnicity  Condition/Temperature Skin Condition/Temp: Dry, Warm  Integrity Skin Integrity: Tear  Turgor Turgor: Epidermis thin w/ loss of subcut tissue  Weekly Pressure Ulcer Documentation  Pressure  Injury Documentation: No Pressure Injury Noted-Pressure Ulcer Prevention Initiated  Wound Prevention & Protection Methods  Orientation of wound Orientation of Wound Prevention: Posterior  Location of Prevention Location of Wound Prevention: Sacrum/Coccyx  Dressing Present Dressing Present : No  Dressing Status Dressing Status: Intact  Wound Offloading Wound Offloading (Prevention Methods): Bed, pressure redistribution/air     INTAKE/OUPUT  Date 08/23/19 0700 - 08/24/19 0659 08/24/19 0700 - 08/25/19 0659   Shift [id number] 1900-0659 24 Hour Total 7092-1167 1408-5480 24 Hour Total   INTAKE   P.O. 720  720        P. O. 720  720      Shift Total(mL/kg) 720(7.1)  720(7.1)      OUTPUT   Urine(mL/kg/hr)           Urine Occurrence(s) 3 x 3 x 6 x 1 x  1 x   Stool           Stool Occurrence(s) 1 x 0 x 1 x 0 x  0 x   Shift Total(mL/kg)           720      Weight (kg) 102 102 102 102 102 102       Recommendations:  1. Patient needs and requests:     2. Diet: Cardiac Mech Soft Nectar Thick     3. Pending tests/procedures:      4. Functional Level/Equipment:     5. Estimated Discharge Date: 8/24/19 Posted on Whiteboard in Patients Room: yes     HEALS Safety Check    A safety check occurred in the patient's room between off going nurse and oncoming nurse listed above. The safety check included the below items  Area Items   H  High Alert Medications - Verify all high alert medication drips (heparin, PCA, etc.)   E  Equipment - Suction is set up for ALL patients (with diann)  - Red plugs utilized for all equipment (IV pumps, etc.)  - WOWs wiped down at end of shift.  - Room stocked with oxygen, suction, and other unit-specific supplies   A  Alarms - Bed alarm is set for fall risk patients  - Ensure chair alarm is in place and activated if patient is up in a chair   L  Lines - Check IV for any infiltration  - Boykin bag is empty if patient has a Boykin   - Tubing and IV bags are labeled   S  Safety   - Room is clean, patient is clean, and equipment is clean. - Hallways are clear from equipment besides carts. - Fall bracelet on for fall risk patients  - Ensure room is clear and free of clutter  - Suction is set up for ALL patients (with diann)  - Hallways are clear from equipment besides carts.    - Isolation precautions followed, supplies available outside room, sign posted

## 2019-08-24 NOTE — PROGRESS NOTES
Progress Note    Patient: Michael Antunez MRN: [id number]  CSN: [id number]    YOB: 1955  Age: 71 y.o. Sex: male    DOA: 8/15/2019 LOS:  LOS: 9 days                    Subjective:     Primary Rehab Impairment Category (ANGIE): Stroke     Impairment Group Label: Right body involvement (left brain)     Etiologic Diagnosis: Acute Ischemic Stroke (acute/early subacute infarct at the left posterior basal ganglia to corona radiata) with residual right hemiparesis, dysphagia and dysarthria    Pt seen and examined  Reviewed record  Review of systems  General: No fevers or chills. Cardiovascular: No chest pain or pressure. No palpitations. Pulmonary: No shortness of breath, cough or wheeze. Gastrointestinal: No abdominal pain, nausea, vomiting or diarrhea. Genitourinary: No  dysuria. Musculoskeletal:  Rt arm and Rt leg weakness    Neurologic: No headache Rt sided weakness    Objective:     Physical Exam:  Visit Vitals  /61 (BP 1 Location: Left arm, BP Patient Position: At rest)   Pulse (!) 56   Temp 97.4 °F (36.3 °C)   Resp 18   Ht 5' 9\" (1.753 m)   Wt 102 kg (224 lb 14.4 oz)   SpO2 99%   BMI 33.21 kg/m²        General:         Alert,  no acute distress    HEENT: NC, Atraumatic. PERRLA, anicteric sclerae. Lungs: CTA Bilaterally. No Wheezing/Rhonchi/Rales. Heart:  Regular  rhythm,  No murmur, No Rubs, No Gallops  Abdomen: Soft, Non distended, Non tender. Extremities: No lower limb edema  Psych:    Not anxious or agitated. Neurologic:  CN 2-12 grossly intact, Alert and oriented X 3. Cranial nerves II-XII are grossly intact except for slightly shallow right nasolabial fold, dysarthria and dysphagia.  No gross sensory deficit.  Motor strength is 5/5 on the LUE and LLE, 0/5 on the RUE (except for 1/5 on the right shoulder), 2 to 2+/5 on RLE (except for 0/5 on the right ankle/foot).        Intake and Output:  Current Shift:  08/24 0701 - 08/24 1900  In: 240 [P.O.:240]  Out: -   Last three shifts: 08/22 1901 - 08/24 0700  In: 720 [P.O.:720]  Out: 470 [Urine:470]    Labs: Results:       Chemistry Recent Labs     08/24/19  0637 08/22/19 0604   * 94    141   K 3.8 3.6    108   CO2 25 25   BUN 35* 35*   CREA 1.53* 1.33*   CA 9.0 8.8   AGAP 7 8   BUCR 23* 26*      CBC w/Diff Recent Labs     08/22/19  0604   HGB 8.6*   HCT 26.3*         Cardiac Enzymes No results for input(s): CPK, CKND1, DANY in the last 72 hours. No lab exists for component: CKRMB, TROIP   Coagulation No results for input(s): PTP, INR, APTT in the last 72 hours. No lab exists for component: INREXT, INREXT    Lipid Panel Lab Results   Component Value Date/Time    Cholesterol, total 173 08/14/2019 03:42 AM    HDL Cholesterol 39 (L) 08/14/2019 03:42 AM    LDL, calculated 94 08/14/2019 03:42 AM    VLDL, calculated 40 08/14/2019 03:42 AM    Triglyceride 200 (H) 08/14/2019 03:42 AM    CHOL/HDL Ratio 4.4 08/14/2019 03:42 AM      BNP No results for input(s): BNPP in the last 72 hours. Liver Enzymes No results for input(s): TP, ALB, TBIL, AP, SGOT, GPT in the last 72 hours.     No lab exists for component: DBIL   Thyroid Studies Lab Results   Component Value Date/Time    TSH 4.63 (H) 08/19/2019 06:55 AM          Procedures/imaging: see electronic medical records for all procedures/Xrays and details which were not copied into this note but were reviewed prior to creation of Plan    Medications:   Current Facility-Administered Medications   Medication Dose Route Frequency    labetalol (NORMODYNE) tablet 600 mg  600 mg Oral Q12H    spironolactone (ALDACTONE) tablet 50 mg  50 mg Oral BID    baclofen (LIORESAL) tablet 5 mg  5 mg Oral TID    ferrous sulfate tablet 325 mg  1 Tab Oral DAILY WITH BREAKFAST    ascorbic acid (vitamin C) (VITAMIN C) tablet 250 mg  250 mg Oral DAILY WITH BREAKFAST    cyanocobalamin tablet 1,000 mcg  1,000 mcg Oral DAILY    cholecalciferol (VITAMIN D3) capsule 5,000 Units  5,000 Units Oral DAILY  isosorbide dinitrate (ISORDIL) tablet 30 mg  30 mg Oral Q8H    hydrALAZINE (APRESOLINE) tablet 25 mg  25 mg Oral TID PRN    acetaminophen (TYLENOL) tablet 650 mg  650 mg Oral Q4H PRN    bisacodyl (DULCOLAX) tablet 10 mg  10 mg Oral Q48H PRN    heparin (porcine) injection 5,000 Units  5,000 Units SubCUTAneous Q8H    insulin lispro (HUMALOG) injection   SubCUTAneous TIDAC    magnesium oxide (MAG-OX) tablet 400 mg  400 mg Oral DAILY    aspirin chewable tablet 81 mg  81 mg Oral DAILY WITH BREAKFAST    amLODIPine (NORVASC) tablet 10 mg  10 mg Oral DAILY    atorvastatin (LIPITOR) tablet 80 mg  80 mg Oral QHS    hydrALAZINE (APRESOLINE) tablet 75 mg  75 mg Oral Q8H       Assessment/Plan     Principal Problem:    Acute ischemic stroke (HCC) (8/12/2019)      Overview: Acute Ischemic Stroke (acute/early subacute infarct at the left posterior       basal ganglia to corona radiata) with residual right hemiparesis,       dysphagia and dysarthria    Active Problems:    Impaired mobility and ADLs (8/12/2019)      Received intravenous tissue plasminogen activator (tPA) in emergency department (8/12/2019)      Hypertensive heart and kidney disease without heart failure and with stage 2 chronic kidney disease ()      Right hemiparesis (HCC) (8/12/2019)      Dysphagia (8/12/2019)      Dysarthria (8/12/2019)      Chronic hypokalemia ()      Overview: Persistent chronic hypokalemia + hypertension; ?primary hyperaldosteronism      Current use of aspirin (8/14/2019)      On statin therapy due to risk of future cardiovascular event (8/14/2019)      Overview:  On Atorvastatin      Type 2 diabetes mellitus with stage 2 chronic kidney disease (HCC) ()      Overview: HbA1c (8/13/2019) = 6.6      Pure hypercholesterolemia (8/15/2019)      Overview: Lipid profile (8/13/2019) showed , , HDL 42, ; Lipid       profile (8/14/2019) showed , , HDL 39, LDL 94      Refusal of statin medication by patient (8/13/2019)      Overview: As per note of Neurology (Dr. Chato Rincon), patient refuses statin agent       because of potential side effects. Leukocytosis (8/16/2019)      Overview: WBC count (8/16/2019) = 17.9      Vitamin B12 deficiency anemia (8/14/2019)      Overview: Vitamin B12 (8/14/2019) = 208      Iron deficiency anemia (8/14/2019)      Overview: Anemia work-up (8/14/2019) showed serum iron 22, TIBC 371, iron %       saturation 6, ferritin 34, reticulocyte count 1.4            Vitamin D deficiency (8/19/2019)      Overview: Vitamin D 25-Hydroxy (8/19/2019) = 16.2       Primary osteoarthritis of right ankle (8/22/2019)      Overview: X-ray of the right ankle (8/22/2019) showed no acute fracture or       subluxation; advanced degenerative changes at the tibiotalar joint; old       fracture fragment vs. accessory ossicle in the inferior aspect of the       lateral malleolus; soft tissue swelling around the ankle. Acute Ischemic Stroke (acute/early subacute infarct at the left posterior basal ganglia to corona radiata) with residual right hemiparesis, dysphagia and dysarthria; S/P Administration of intravenous tPA (8/12/2019 - [address])              Aspirin 81 mg PO once daily with breakfast              Atorvastatin 80 mg PO q HS    Type 2 diabetes mellitus with stage 2 chronic kidney disease               HbA1c (8/13/2019) = 6.6               Humalog insulin sliding scale SC TID     Question primary hyperaldosteronism   spironolactone  50 mg BID  F/u endocrinology consult pt might have hyperaldosteronism 24 h urine aldosterone pending      Hypertension  Labetalol 200 mg BID  Norvasc 10 mg daily  Hydralazine 75 mg q 8h  Hydralazine  25 mg q8h prn SBP above 170    Chronic leucocytosis   Continue to monitor lab      DVT prophylaxis heparin SQ.          Maria Fernanda Arthur MD  8/24/2019 11:49  AM

## 2019-08-24 NOTE — PROGRESS NOTES
Problem: Dysphagia (Adult)  Goal: *Speech Goal: (INSERT TEXT)  Description  Long term goals  Patient will:  1. Tolerate regular diet with thin liquids using safe swallowing techniques without s/s of aspiration in 5/6 meals. 2. Use safe swallowing techniques (including slowed rate, small bites/sips, alternate liquids/solids, effortful swallow, head rotation to right for liquids) with supervision. 3. Participate in MBS study prior to advancing to thin liquids to assure safety (no aspiration). 4. Perform oral and pharyngeal strengthening exercises (to improve speech and swallowing) with supervision-modified independence. 5. Demonstrate 90% intelligibility in conversation using appropriate speaking strategies (slowed rate, over articulation, increased volume). 6. Recall 3 words after 5 minutes with supervision. 7. Perform functional problem solving/reasoning tasks with 90% accuracy. 8. Name 8-10 items in categories of increasing abstraction, supervision. Short term goals (by 8/30/19)  Patient will:   1. Tolerate soft diet with nectar thick liquids using safe swallowing techniques without s/s of aspiration in 5/6 meals. 2. Use safe swallowing techniques (including slowed rate, small bites/sips, alternate liquids/solids, effortful swallow, head rotation to right for liquids) with min cues. 3. Tolerate small amounts of ice chips and water with the SLP using head rotation to the right and/or chin tuck, without overt s/s of aspiration. 4. Perform oral and pharyngeal strengthening exercises (to improve speech and swallowing) with min assist.  5. Demonstrate 90% intelligibility in polysyllabic words and sentences using appropriate speaking strategies (slowed rate, over articulation, increased volume). 6. Recall 3 words after 5 minutes with mod assist.  7. Perform functional problem solving/reasoning tasks with 75-85% accuracy. 8. Name 8-10 items in concrete categories, supervision.     Speech language pathology treatment    Patient: Elizabeth Haley (54 y.o. male)  Date: 8/24/2019  Diagnosis: Acute ischemic stroke ([facility] [address] [I63.9] Acute ischemic stroke ([facility] [address]      Time In: 9:30  Time Out:  10:00    ASSESSMENT:  Pt alert and oriented x4 Nestor. He received 30 min ST session in SLP's office. Pt is compliant during therapy and had a positive attitude. He continues to present with right sided facial weakness, dysarthria, and dysphagia. Pt also continues to have decreased problem solving skills. He benefits from models, semantic cues, and repetition of prompt to complete therapy tasks. He continues to benefit from services to address his deficits. Progression toward goals:  [x]       Improving appropriately and progressing toward goals  []       Improving slowly and progressing toward goals  []       Not making progress toward goals and plan of care will be adjusted     PLAN:  Patient continues to benefit from skilled intervention to address the above impairments. Continue treatment per established plan of care. Discharge Recommendations:  Skilled Nursing Facility     SUBJECTIVE:   Patient stated my face, my eyebrows, and facial features. OBJECTIVE:   Mental Status:  Neurologic State: Alert  Orientation Level: Oriented X4  Cognition: Follows commands  Perception: Appears intact  Perseveration: No perseveration noted  Safety/Judgement: Fall prevention  Treatment & Interventions:   Motor Speech:   Facial exercises: 10x each exercise, targeted 6 exercises (eyebrows, eyes, lips)   Discussed intelligibility strategies: over articulation and decreased rate  Intelligibility in phrases: ~80% intelligibility with Mod-Max A   Words, 1 syllable: 90-95% intelligibility Nestor  Neuro-Linguistics:  Perform functional problem solving/reasoning tasks: 80% accuracy with Min A fading to independence  Name items in concrete categories: 10 named Nestor, named body parts    Response & Tolerance to Activities:   Pt was compliant during therapy and benefited from models and verbal cues to complete therapy tasks. See above for more information. Pain:  Pre-Treatment:    0  Post-Treatment:  0    After treatment:   []       Patient assisted to gym for next session. [x]       Patient left in no apparent distress sitting up in chair. []       Patient left in no apparent distress in bed. []       Call bell left within reach. []       Nursing notified. []       Caregiver present. []       Bed alarm activated.     Estimated LOS: through 9/6/2019  Discharge recommendations: [id number] CCC-SLP  Time Calculation: 30 mins

## 2019-08-24 NOTE — ROUTINE PROCESS
SHIFT CHANGE NOTE FOR [facility]    Bedside and Verbal shift change report given to Prateek Hickey RN (oncoming nurse) by Kenneth Thomas RN   (offgoing nurse). Report included the following information SBAR, Kardex, MAR and Recent Results. Situation:   Code Status: Full Code   Reason for Admission: CVA  Hospital Day: 9   Problem List:   Hospital Problems  Date Reviewed: 8/23/2019          Codes Class Noted POA    Primary osteoarthritis of right ankle (Chronic) ICD-10-CM: M19.071  ICD-9-CM: 715.17  8/22/2019 Yes    Overview Signed 8/23/2019 11:54 AM by Ami Cole MD     X-ray of the right ankle (8/22/2019) showed no acute fracture or subluxation; advanced degenerative changes at the tibiotalar joint; old fracture fragment vs. accessory ossicle in the inferior aspect of the lateral malleolus; soft tissue swelling around the ankle.              Vitamin D deficiency (Chronic) ICD-10-CM: E55.9  ICD-9-CM: 268.9  8/19/2019 Yes    Overview Signed 8/19/2019  1:34 PM by Ami Cole MD     Vitamin D 25-Hydroxy (8/19/2019) = 16.2              Leukocytosis ICD-10-CM: F58.854  ICD-9-CM: 288.60  8/16/2019 Yes    Overview Signed 8/16/2019 11:19 AM by Ami Cole MD     WBC count (8/16/2019) = 17.9             Hypertensive heart and kidney disease without heart failure and with stage 2 chronic kidney disease (Chronic) ICD-10-CM: I13.10, N18.2  ICD-9-CM: 404.90, 585.2  Unknown Yes        Chronic hypokalemia ICD-10-CM: E87.6  ICD-9-CM: 276.8  Unknown Yes    Overview Addendum 8/15/2019 11:31 AM by Ami Cole MD     Persistent chronic hypokalemia + hypertension; ?primary hyperaldosteronism             Type 2 diabetes mellitus with stage 2 chronic kidney disease ([facility] [address] (Chronic) ICD-10-CM: E11.22, N18.2  ICD-9-CM: 250.40, 585.2  Unknown Yes    Overview Signed 8/15/2019 12:26 PM by Ami Cole MD     HbA1c (8/13/2019) = 6.6             Pure hypercholesterolemia (Chronic) ICD-10-CM: E78.00  ICD-9-CM: 272.0 8/15/2019 Yes    Overview Addendum 8/15/2019  6:27 PM by Fozia Webb MD     Lipid profile (8/13/2019) showed , , HDL 42, ; Lipid profile (8/14/2019) showed , , HDL 39, LDL 94             Current use of aspirin ICD-10-CM: Z79.82  ICD-9-CM: V58.66  8/14/2019 Yes        On statin therapy due to risk of future cardiovascular event ICD-10-CM: Z79.899  ICD-9-CM: V58.69  8/14/2019 Yes    Overview Signed 8/15/2019 12:21 PM by Fozia Webb MD     On Atorvastatin             Vitamin B12 deficiency anemia (Chronic) ICD-10-CM: D51.9  ICD-9-CM: 281.1  8/14/2019 Yes    Overview Signed 8/19/2019  1:24 PM by Fozia Webb MD     Vitamin B12 (8/14/2019) = 208             Iron deficiency anemia (Chronic) ICD-10-CM: D50.9  ICD-9-CM: 280.9  8/14/2019 Yes    Overview Signed 8/19/2019  1:25 PM by Fozia Webb MD     Anemia work-up (8/14/2019) showed serum iron 22, TIBC 371, iron % saturation 6, ferritin 34, reticulocyte count 1.4               Refusal of statin medication by patient ICD-10-CM: Z53.29  ICD-9-CM: V64.2  8/13/2019 Yes    Overview Signed 8/15/2019  2:01 PM by Fozia Webb MD     As per note of Neurology (Dr. Deb Dubose), patient refuses statin agent because of potential side effects.               * (Principal) Acute ischemic stroke [facility]) ICD-10-CM: I63.9  ICD-9-CM: 434.91  8/12/2019 Yes    Overview Signed 8/15/2019 12:17 AM by Fozia Wbeb MD     Acute Ischemic Stroke (acute/early subacute infarct at the left posterior basal ganglia to corona radiata) with residual right hemiparesis, dysphagia and dysarthria             Impaired mobility and ADLs ICD-10-CM: Z74.09  ICD-9-CM: 799.89  8/12/2019 Yes        Received intravenous tissue plasminogen activator (tPA) in emergency department ICD-10-CM: Z92.82  ICD-9-CM: V45.88  8/12/2019 Yes        Right hemiparesis ([facility] [address] ICD-10-CM: G81.91  ICD-9-CM: 342.90  8/12/2019 Yes        Dysphagia ICD-10-CM: R13.10  ICD-9-CM: 787.20 8/12/2019 Yes        Dysarthria ICD-10-CM: R47.1  ICD-9-CM: 784.51  8/12/2019 Yes              Background:   Past Medical History:   Past Medical History:   Diagnosis Date    Acute ischemic stroke ([facility] [address] 8/12/2019    Acute Ischemic Stroke (acute/early subacute infarct at the left posterior basal ganglia to corona radiata) with residual right hemiparesis, dysphagia and dysarthria    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     On Allopurinol    Chronic hypokalemia     Persistent chronic hypokalemia + hypertension; ?primary hyperaldosteronism    CKD (chronic kidney disease) stage 2, GFR 60-89 ml/min 8/15/2019    Current use of aspirin 8/14/2019    Dysarthria 8/12/2019    Dysphagia 8/12/2019    Elevated prostate specific antigen (PSA) 7/21/2011    First degree atrioventricular block by electrocardiogram 8/12/2019    Hypertensive heart and kidney disease without heart failure and with stage 2 chronic kidney disease     Iron deficiency anemia 8/14/2019    Anemia work-up (8/14/2019) showed serum iron 22, TIBC 371, iron % saturation 6, ferritin 34, reticulocyte count 1.4     Obesity, Class I, BMI 30-34.9     Obstructive sleep apnea on CPAP     On statin therapy due to risk of future cardiovascular event 8/14/2019    On Atorvastatin    Primary osteoarthritis of right ankle 8/22/2019    X-ray of the right ankle (8/22/2019) showed no acute fracture or subluxation; advanced degenerative changes at the tibiotalar joint; old fracture fragment vs. accessory ossicle in the inferior aspect of the lateral malleolus; soft tissue swelling around the ankle.     Pure hypercholesterolemia 08/15/2019    Lipid profile (8/13/2019) showed , , HDL 42, ; Lipid profile (8/14/2019) showed , , HDL 39, LDL 94    Received intravenous tissue plasminogen activator (tPA) in emergency department 8/12/2019    Refusal of statin medication by patient 8/13/2019    As per note of Neurology (Dr. Nathan Villasenor), patient refuses statin agent because of potential side effects.  Right hemiparesis ([address] 8/12/2019    Type 2 diabetes mellitus with stage 2 chronic kidney disease (HCC)     HbA1c (8/13/2019) = 6.6    Vitamin B12 deficiency anemia 8/14/2019    Vitamin B12 (8/14/2019) = 208    Vitamin D deficiency 8/19/2019    Vitamin D 25-Hydroxy (8/19/2019) = 16.2       Patient taking anticoagulants yes Heparin, ASA   Patient has a defibrillator: no     Assessment:   Changes in Assessment throughout shift: No     Patient has central line: no Reasons if yes: Insertion date: Last dressing date:   Patient has Boykin Cath: no Reasons if yes:     Insertion date:     Last Vitals:     Vitals:    08/24/19 0634 08/24/19 0822 08/24/19 1409 08/24/19 1542   BP: 170/73 164/61 154/72 129/65   Pulse: 66 (!) 56 69 70   Resp:  18  18   Temp:  97.4 °F (36.3 °C)  98 °F (36.7 °C)   SpO2:  99%  94%   Weight:       Height:            PAIN    Pain Assessment    Pain Intensity 1: 0 (08/24/19 1555) Pain Intensity 1: 2 (12/29/14 1105)    Pain Location 1: Shoulder Pain Location 1: Abdomen    Pain Intervention(s) 1: Medication (see MAR) Pain Intervention(s) 1: Medication (see MAR)  Patient Stated Pain Goal: 0 Patient Stated Pain Goal: 0  o Intervention effective: no    o Other actions taken for pain:      Skin Assessment  Skin color Skin Color: Appropriate for ethnicity  Condition/Temperature Skin Condition/Temp: Warm, Dry  Integrity Skin Integrity: Tear  Turgor Turgor: Epidermis thin w/ loss of subcut tissue  Weekly Pressure Ulcer Documentation  Pressure  Injury Documentation: No Pressure Injury Noted-Pressure Ulcer Prevention Initiated  Wound Prevention & Protection Methods  Orientation of wound Orientation of Wound Prevention: Posterior  Location of Prevention Location of Wound Prevention: Buttocks, Sacrum/Coccyx  Dressing Present Dressing Present : No  Dressing Status Dressing Status: Intact  Wound Offloading Wound Offloading (Prevention Methods): Adaptive equipment, Bed, pressure reduction mattress, Chair cushion, Elevate heels, Pillows, Repositioning, Turning, Wheelchair     INTAKE/OUPUT  Date 08/23/19 1900 - 08/24/19 0659 08/24/19 0700 - 08/25/19 0659   Shift 8764-8299 24 Hour Total 6668-4518 3035-6249 24 Hour Total   INTAKE   P.O.  720 480  480     P. O.  720 480  480   Shift Total(mL/kg)  720(7.1) 480(4.7)  480(4.7)   OUTPUT   Urine(mL/kg/hr)          Urine Occurrence(s) 3 x 6 x 5 x  5 x   Stool          Stool Occurrence(s) 0 x 1 x 1 x  1 x   Shift Total(mL/kg)        NET  720 480  480   Weight (kg) 102 102 102 102 102       Recommendations:  1. Patient needs and requests:     2. Diet: Cardiac Mech Soft Nectar Thick     3. Pending tests/procedures:      4. Functional Level/Equipment:     5. Estimated Discharge Date: 8/24/19 Posted on Whiteboard in Patients Room: yes     HEALS Safety Check    A safety check occurred in the patient's room between off going nurse and oncoming nurse listed above. The safety check included the below items  Area Items   H  High Alert Medications - Verify all high alert medication drips (heparin, PCA, etc.)   E  Equipment - Suction is set up for ALL patients (with diann)  - Red plugs utilized for all equipment (IV pumps, etc.)  - WOWs wiped down at end of shift.  - Room stocked with oxygen, suction, and other unit-specific supplies   A  Alarms - Bed alarm is set for fall risk patients  - Ensure chair alarm is in place and activated if patient is up in a chair   L  Lines - Check IV for any infiltration  - Boykin bag is empty if patient has a Boykin   - Tubing and IV bags are labeled   S  Safety   - Room is clean, patient is clean, and equipment is clean. - Hallways are clear from equipment besides carts. - Fall bracelet on for fall risk patients  - Ensure room is clear and free of clutter  - Suction is set up for ALL patients (with diann)  - Hallways are clear from equipment besides carts.    - Isolation precautions followed, supplies available outside room, sign posted

## 2019-08-24 NOTE — PROGRESS NOTES
Problem: Mobility Impaired (Adult and Pediatric)  Goal: *Acute Goals and Plan of Care (Insert Text)  Description  Physical Therapy Short Term Goals  Initiated 2019 and to be accomplished within 14 day(s)  1. Patient will move from supine to sit and sit to supine  in bed with minimal assistance/contact guard assist.     2.  Patient will transfer from bed to chair and chair to bed with minimal/moderate assistance using the least restrictive device. 3.  Patient will perform sit to stand with minimal assistance/moderate assistance. 4.  Patient will ambulate with moderate assistance  for 10 feet with the least restrictive device. Physical Therapy Goals  Initiated 2019 and to be accomplished within 4 weeks  1. Patient will move from supine to sit and sit to supine  in bed with supervision/set-up. 2.  Patient will transfer from bed to chair and chair to bed with supervision/set-up using the least restrictive device. 3.  Patient will perform sit to stand with supervision/set-up. 4.  Patient will ambulate with minimal assistance for 50 feet with the least restrictive device. 5.  Patient will ascend/descend 2 stairs with 1 handrail(s) with moderate assistance . Outcome: Progressing Towards Goal   PHYSICAL THERAPY TREATMENT    Patient: Rola Franco (68 y.o. male)  Date: 2019  Diagnosis: Acute ischemic stroke [facility]) [I63.9] Acute ischemic stroke [facility])  Precautions: Aspiration, NWB  Chart, physical therapy assessment, plan of care and goals were reviewed. Time In: 1140  Time Out: 1240  Patient Seen For: (AM: transfer trg., bed mobility, there x gait)   [id number] for gait trg., family education  Pain: AM/PM  Pt pain was reported as  0 pre-treatment. Pt pain was reported as 0 post-treatment. Intervention: na    Patient identified with name and :  Y    SUBJECTIVE:   \"She'll (Petr) be here at 1. She had to get her haircut, do some things. \"    OBJECTIVE DATA SUMMARY:   Objective: BED/MAT MOBILITY Daily Assessment    Rolling Right : 5 (Stand-by assistance)  Rolling Left : 5 (Stand-by assistance)(VC UE mgt)  Supine to Sit : (supine to affected R mod/min; sup to L CGA)  Sit to Supine : 5 (Supervision)(self assisted hooking R LE)  Able to hook R LE without assist       TRANSFERS Daily Assessment    Transfer Type: SPT without device  Other: (stand step wout device)  WC<>MAT  Transfer Assistance : 4 (Minimal assistance)  Sit to Stand Assistance: Minimal assistance       GAIT Daily Assessment    Amount of Assistance: 3 (Moderate assistance)(mod a balance assist and for step length, walker mgt. ) VC for hand placement as tends to want ot pull on walker to stand. Distance (ft): (24x1; 12 x2 w seated rest breaks) between each trial  Assistive Device: Gait belt;Walker, rolling; Other (comment)(w platform attachment R)    PM: gait trg level surfaces 36 ft x 1 manual cues R hip to increase step height and promote weight shift & VC to decrease step length B and for walker mgt./placement Gait belt;Walker, rolling; Other (comment)(w platform attachment R) & close follow w WC. Appreciate wife assistance.           BALANCE Daily Assessment    Sitting - Static: Good (unsupported)  Sitting - Dynamic: Fair (occasional)(+)  Standing - Static: Fair  Standing - Dynamic : Impaired       WHEELCHAIR MOBILITY Daily Assessment    Able to Propel (ft): 150 feet  Functional Level: (5)  Wheelchair Setup Assist Required : 4 (Minimal assistance)       THERAPEUTIC EXERCISES Daily Assessment     LE's hooked>reverse \"curl\" , hold, lower extend LE's w VC for breath x 5 reps  L SLR 2 x 10  R resisted HIP ADD x 7 reps  R ankle (A) DF/PF & L (P>AA) x 10  R 90 deg hip/knee place /hold x 5  R single LE bridge off eoM w 14 in height and 8 in height box; 5 reps respectively  B Bridging w small ball x 10 max cueing for breathing           Neuro Re-Education:      ASSESSMENT:  AM:  Significantly decreased physical assist required during transfer since last seen by this PTA. Increased AROM R LE. Sliding through R LE to advance. Max cueng to decrease step length B ot maximize safety and walker mgt. PM: Gait trg as above. Progression toward goals:  ?      Improving appropriately and progressing toward goals       PLAN:  Patient continues to benefit from skilled intervention to address the above impairments. Continue treatment per established plan of care. Discharge Recommendations:  Home Health  Further Equipment Recommendations for Discharge: TBD     Estimated LOS:  9/6/19    Activity Tolerance:   fair  Please refer to the flowsheet for vital signs taken during this treatment. After treatment:   Patient [facility] in [facility]. Propelled to dining room for lunch.       Elena Purdy, PTA  8/24/2019

## 2019-08-25 LAB
ALDOST 24H UR-MRATE: 12.78 UG/24 HR (ref 0–19)
ALDOST UR-MCNC: 11.11 UG/L
COLLECT DURATION TIME UR: 24 HR
GLUCOSE BLD STRIP.AUTO-MCNC: 106 MG/DL (ref 70–110)
GLUCOSE BLD STRIP.AUTO-MCNC: 110 MG/DL (ref 70–110)
GLUCOSE BLD STRIP.AUTO-MCNC: 110 MG/DL (ref 70–110)
GLUCOSE BLD STRIP.AUTO-MCNC: 160 MG/DL (ref 70–110)
SPECIMEN VOL ?TM UR: 1150 ML

## 2019-08-25 PROCEDURE — 65310000000 HC RM PRIVATE REHAB

## 2019-08-25 PROCEDURE — 74011250637 HC RX REV CODE- 250/637: Performed by: INTERNAL MEDICINE

## 2019-08-25 PROCEDURE — 74011250636 HC RX REV CODE- 250/636: Performed by: INTERNAL MEDICINE

## 2019-08-25 PROCEDURE — 82962 GLUCOSE BLOOD TEST: CPT

## 2019-08-25 RX ADMIN — ISOSORBIDE DINITRATE 30 MG: 20 TABLET ORAL at 06:25

## 2019-08-25 RX ADMIN — FERROUS SULFATE TAB 325 MG (65 MG ELEMENTAL FE) 325 MG: 325 (65 FE) TAB at 08:33

## 2019-08-25 RX ADMIN — SPIRONOLACTONE 50 MG: 25 TABLET ORAL at 08:33

## 2019-08-25 RX ADMIN — ISOSORBIDE DINITRATE 30 MG: 20 TABLET ORAL at 21:37

## 2019-08-25 RX ADMIN — AMLODIPINE BESYLATE 10 MG: 10 TABLET ORAL at 08:33

## 2019-08-25 RX ADMIN — SPIRONOLACTONE 50 MG: 25 TABLET ORAL at 21:37

## 2019-08-25 RX ADMIN — ISOSORBIDE DINITRATE 30 MG: 20 TABLET ORAL at 14:02

## 2019-08-25 RX ADMIN — HYDRALAZINE HYDROCHLORIDE 75 MG: 50 TABLET, FILM COATED ORAL at 06:25

## 2019-08-25 RX ADMIN — ASPIRIN 81 MG 81 MG: 81 TABLET ORAL at 08:33

## 2019-08-25 RX ADMIN — HYDRALAZINE HYDROCHLORIDE 75 MG: 50 TABLET, FILM COATED ORAL at 14:04

## 2019-08-25 RX ADMIN — ATORVASTATIN CALCIUM 80 MG: 40 TABLET, FILM COATED ORAL at 21:37

## 2019-08-25 RX ADMIN — HYDRALAZINE HYDROCHLORIDE 75 MG: 50 TABLET, FILM COATED ORAL at 21:37

## 2019-08-25 RX ADMIN — HEPARIN SODIUM 5000 UNITS: 5000 INJECTION INTRAVENOUS; SUBCUTANEOUS at 13:59

## 2019-08-25 RX ADMIN — LABETALOL HCL 600 MG: 200 TABLET, FILM COATED ORAL at 08:32

## 2019-08-25 RX ADMIN — BACLOFEN 5 MG: 10 TABLET ORAL at 12:29

## 2019-08-25 RX ADMIN — Medication 250 MG: at 08:34

## 2019-08-25 RX ADMIN — Medication 5000 UNITS: at 08:32

## 2019-08-25 RX ADMIN — Medication 400 MG: at 08:32

## 2019-08-25 RX ADMIN — BACLOFEN 5 MG: 10 TABLET ORAL at 17:20

## 2019-08-25 RX ADMIN — HEPARIN SODIUM 5000 UNITS: 5000 INJECTION INTRAVENOUS; SUBCUTANEOUS at 06:25

## 2019-08-25 RX ADMIN — LABETALOL HCL 600 MG: 200 TABLET, FILM COATED ORAL at 21:37

## 2019-08-25 RX ADMIN — CYANOCOBALAMIN TAB 1000 MCG 1000 MCG: 1000 TAB at 08:31

## 2019-08-25 RX ADMIN — HEPARIN SODIUM 5000 UNITS: 5000 INJECTION INTRAVENOUS; SUBCUTANEOUS at 21:38

## 2019-08-25 RX ADMIN — BACLOFEN 5 MG: 10 TABLET ORAL at 06:26

## 2019-08-25 NOTE — ROUTINE PROCESS
SHIFT CHANGE NOTE FOR [facility]    Bedside and Verbal shift change report given to Theodora Brooks RN (oncoming nurse) by Rachana Harding RN   (offgoing nurse). Report included the following information SBAR, Kardex, MAR and Recent Results. Situation:   Code Status: Full Code   Reason for Admission: CVA  Hospital Day: 10   Problem List:   Hospital Problems  Date Reviewed: 8/23/2019          Codes Class Noted POA    Primary osteoarthritis of right ankle (Chronic) ICD-10-CM: M19.071  ICD-9-CM: 715.17  8/22/2019 Yes    Overview Signed 8/23/2019 11:54 AM by Gwen Espinosa MD     X-ray of the right ankle (8/22/2019) showed no acute fracture or subluxation; advanced degenerative changes at the tibiotalar joint; old fracture fragment vs. accessory ossicle in the inferior aspect of the lateral malleolus; soft tissue swelling around the ankle.              Vitamin D deficiency (Chronic) ICD-10-CM: E55.9  ICD-9-CM: 268.9  8/19/2019 Yes    Overview Signed 8/19/2019  1:34 PM by Gwen Espinosa MD     Vitamin D 25-Hydroxy (8/19/2019) = 16.2              Leukocytosis ICD-10-CM: P77.131  ICD-9-CM: 288.60  8/16/2019 Yes    Overview Signed 8/16/2019 11:19 AM by Gwen Espinosa MD     WBC count (8/16/2019) = 17.9             Hypertensive heart and kidney disease without heart failure and with stage 2 chronic kidney disease (Chronic) ICD-10-CM: I13.10, N18.2  ICD-9-CM: 404.90, 585.2  Unknown Yes        Chronic hypokalemia ICD-10-CM: E87.6  ICD-9-CM: 276.8  Unknown Yes    Overview Addendum 8/15/2019 11:31 AM by Gwen Espinosa MD     Persistent chronic hypokalemia + hypertension; ?primary hyperaldosteronism             Type 2 diabetes mellitus with stage 2 chronic kidney disease ([facility] [address] (Chronic) ICD-10-CM: E11.22, N18.2  ICD-9-CM: 250.40, 585.2  Unknown Yes    Overview Signed 8/15/2019 12:26 PM by Gwen Espinosa MD     HbA1c (8/13/2019) = 6.6             Pure hypercholesterolemia (Chronic) ICD-10-CM: E78.00  ICD-9-CM: 272.0 8/15/2019 Yes    Overview Addendum 8/15/2019  6:27 PM by Vikram Wadsworth MD     Lipid profile (8/13/2019) showed , , HDL 42, ; Lipid profile (8/14/2019) showed , , HDL 39, LDL 94             Current use of aspirin ICD-10-CM: Z79.82  ICD-9-CM: V58.66  8/14/2019 Yes        On statin therapy due to risk of future cardiovascular event ICD-10-CM: Z79.899  ICD-9-CM: V58.69  8/14/2019 Yes    Overview Signed 8/15/2019 12:21 PM by Vikram Wadsworth MD     On Atorvastatin             Vitamin B12 deficiency anemia (Chronic) ICD-10-CM: D51.9  ICD-9-CM: 281.1  8/14/2019 Yes    Overview Signed 8/19/2019  1:24 PM by Vikram Wadsworth MD     Vitamin B12 (8/14/2019) = 208             Iron deficiency anemia (Chronic) ICD-10-CM: D50.9  ICD-9-CM: 280.9  8/14/2019 Yes    Overview Signed 8/19/2019  1:25 PM by Vikram Wadsworth MD     Anemia work-up (8/14/2019) showed serum iron 22, TIBC 371, iron % saturation 6, ferritin 34, reticulocyte count 1.4               Refusal of statin medication by patient ICD-10-CM: Z53.29  ICD-9-CM: V64.2  8/13/2019 Yes    Overview Signed 8/15/2019  2:01 PM by Vikram Wadsworth MD     As per note of Neurology (Dr. Wellington Mcfadden), patient refuses statin agent because of potential side effects.               * (Principal) Acute ischemic stroke [facility]) ICD-10-CM: I63.9  ICD-9-CM: 434.91  8/12/2019 Yes    Overview Signed 8/15/2019 12:17 AM by Vikram Wadsworth MD     Acute Ischemic Stroke (acute/early subacute infarct at the left posterior basal ganglia to corona radiata) with residual right hemiparesis, dysphagia and dysarthria             Impaired mobility and ADLs ICD-10-CM: Z74.09  ICD-9-CM: 799.89  8/12/2019 Yes        Received intravenous tissue plasminogen activator (tPA) in emergency department ICD-10-CM: Z92.82  ICD-9-CM: V45.88  8/12/2019 Yes        Right hemiparesis ([facility] [address] ICD-10-CM: G81.91  ICD-9-CM: 342.90  8/12/2019 Yes        Dysphagia ICD-10-CM: R13.10  ICD-9-CM: 787.20 8/12/2019 Yes        Dysarthria ICD-10-CM: R47.1  ICD-9-CM: 784.51  8/12/2019 Yes              Background:   Past Medical History:   Past Medical History:   Diagnosis Date    Acute ischemic stroke ([facility] [address] 8/12/2019    Acute Ischemic Stroke (acute/early subacute infarct at the left posterior basal ganglia to corona radiata) with residual right hemiparesis, dysphagia and dysarthria    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     On Allopurinol    Chronic hypokalemia     Persistent chronic hypokalemia + hypertension; ?primary hyperaldosteronism    CKD (chronic kidney disease) stage 2, GFR 60-89 ml/min 8/15/2019    Current use of aspirin 8/14/2019    Dysarthria 8/12/2019    Dysphagia 8/12/2019    Elevated prostate specific antigen (PSA) 7/21/2011    First degree atrioventricular block by electrocardiogram 8/12/2019    Hypertensive heart and kidney disease without heart failure and with stage 2 chronic kidney disease     Iron deficiency anemia 8/14/2019    Anemia work-up (8/14/2019) showed serum iron 22, TIBC 371, iron % saturation 6, ferritin 34, reticulocyte count 1.4     Obesity, Class I, BMI 30-34.9     Obstructive sleep apnea on CPAP     On statin therapy due to risk of future cardiovascular event 8/14/2019    On Atorvastatin    Primary osteoarthritis of right ankle 8/22/2019    X-ray of the right ankle (8/22/2019) showed no acute fracture or subluxation; advanced degenerative changes at the tibiotalar joint; old fracture fragment vs. accessory ossicle in the inferior aspect of the lateral malleolus; soft tissue swelling around the ankle.     Pure hypercholesterolemia 08/15/2019    Lipid profile (8/13/2019) showed , , HDL 42, ; Lipid profile (8/14/2019) showed , , HDL 39, LDL 94    Received intravenous tissue plasminogen activator (tPA) in emergency department 8/12/2019    Refusal of statin medication by patient 8/13/2019    As per note of Neurology (Dr. Endy Forbes), patient refuses statin agent because of potential side effects.  Right hemiparesis ([address] 8/12/2019    Type 2 diabetes mellitus with stage 2 chronic kidney disease (HCC)     HbA1c (8/13/2019) = 6.6    Vitamin B12 deficiency anemia 8/14/2019    Vitamin B12 (8/14/2019) = 208    Vitamin D deficiency 8/19/2019    Vitamin D 25-Hydroxy (8/19/2019) = 16.2       Patient taking anticoagulants yes Heparin, ASA   Patient has a defibrillator: no     Assessment:   Changes in Assessment throughout shift: No     Patient has central line: no Reasons if yes: Insertion date: Last dressing date:   Patient has Boykin Cath: no Reasons if yes:     Insertion date:     Last Vitals:     Vitals:    08/24/19 1542 08/24/19 2100 08/24/19 2123 08/25/19 0625   BP: 129/65 181/76 181/76 162/76   Pulse: 70 61 61 60   Resp: 18 18     Temp: 98 °F (36.7 °C) 97.9 °F (36.6 °C)     SpO2: 94% 96%     Weight:       Height:            PAIN    Pain Assessment    Pain Intensity 1: 0 (08/25/19 0400) Pain Intensity 1: 2 (12/29/14 1105)    Pain Location 1: Shoulder Pain Location 1: Abdomen    Pain Intervention(s) 1: Medication (see MAR) Pain Intervention(s) 1: Medication (see MAR)  Patient Stated Pain Goal: 0 Patient Stated Pain Goal: 0  o Intervention effective: no    o Other actions taken for pain:      Skin Assessment  Skin color Skin Color: Appropriate for ethnicity  Condition/Temperature Skin Condition/Temp: Dry, Warm  Integrity Skin Integrity: Intact  Turgor Turgor: Epidermis thin w/ loss of subcut tissue  Weekly Pressure Ulcer Documentation  Pressure  Injury Documentation: No Pressure Injury Noted-Pressure Ulcer Prevention Initiated  Wound Prevention & Protection Methods  Orientation of wound Orientation of Wound Prevention: Posterior  Location of Prevention Location of Wound Prevention: Buttocks, Sacrum/Coccyx  Dressing Present Dressing Present : No  Dressing Status Dressing Status: Intact  Wound Offloading Wound Offloading (Prevention Methods): Bed, pressure redistribution/air, Chair cushion     INTAKE/OUPUT  Date 08/24/19 0700 - 08/25/19 0659 08/25/19 0700 - 08/26/19 0659   Shift [id number] 1900-0659 24 Hour Total 1272-6940 5228-5329 24 Hour Total   INTAKE   P.O. 480  480        P. O. 480  480      Shift Total(mL/kg) 480(4.7)  480(4.7)      OUTPUT   Urine(mL/kg/hr)           Urine Occurrence(s) 5 x  5 x      Stool           Stool Occurrence(s) 1 x  1 x      Shift Total(mL/kg)           480      Weight (kg) 102 102 102 102 102 102       Recommendations:  1. Patient needs and requests:     2. Diet: Cardiac Mech Soft Nectar Thick     3. Pending tests/procedures:      4. Functional Level/Equipment:     5. Estimated Discharge Date: 8/24/19 Posted on Whiteboard in Patients Room: yes     HEALS Safety Check    A safety check occurred in the patient's room between off going nurse and oncoming nurse listed above. The safety check included the below items  Area Items   H  High Alert Medications - Verify all high alert medication drips (heparin, PCA, etc.)   E  Equipment - Suction is set up for ALL patients (with diann)  - Red plugs utilized for all equipment (IV pumps, etc.)  - WOWs wiped down at end of shift.  - Room stocked with oxygen, suction, and other unit-specific supplies   A  Alarms - Bed alarm is set for fall risk patients  - Ensure chair alarm is in place and activated if patient is up in a chair   L  Lines - Check IV for any infiltration  - Boykin bag is empty if patient has a Boykin   - Tubing and IV bags are labeled   S  Safety   - Room is clean, patient is clean, and equipment is clean. - Hallways are clear from equipment besides carts. - Fall bracelet on for fall risk patients  - Ensure room is clear and free of clutter  - Suction is set up for ALL patients (with diann)  - Hallways are clear from equipment besides carts.    - Isolation precautions followed, supplies available outside room, sign posted

## 2019-08-25 NOTE — PROGRESS NOTES
Progress Note    Patient: Asha Babin MRN: [id number]  CSN: [id number]    YOB: 1955  Age: 71 y.o. Sex: male    DOA: 8/15/2019 LOS:  LOS: 10 days                    Subjective:     Primary Rehab Impairment Category (ANGIE): Stroke     Impairment Group Label: Right body involvement (left brain)     Etiologic Diagnosis: Acute Ischemic Stroke (acute/early subacute infarct at the left posterior basal ganglia to corona radiata) with residual right hemiparesis, dysphagia and dysarthria    Pt seen and examined   Review of systems  General: No fevers or chills. Cardiovascular: No chest pain or pressure. No palpitations. Pulmonary: No shortness of breath, cough or wheeze. Gastrointestinal: No abdominal pain, nausea, vomiting or diarrhea. Genitourinary: No  dysuria. Musculoskeletal:  Rt arm and Rt leg weakness    Neurologic: No headache Rt sided weakness    Objective:     Physical Exam:  Visit Vitals  /75 (BP 1 Location: Left arm, BP Patient Position: At rest)   Pulse 60   Temp 97.5 °F (36.4 °C)   Resp 18   Ht 5' 9\" (1.753 m)   Wt 102 kg (224 lb 14.4 oz)   SpO2 96%   BMI 33.21 kg/m²        General:         Alert,  no acute distress    HEENT: NC, Atraumatic. PERRLA, anicteric sclerae. Lungs: CTA Bilaterally. No Wheezing/Rhonchi/Rales. Heart:  Regular  rhythm,  No murmur, No Rubs, No Gallops  Abdomen: Soft, Non distended, Non tender. Extremities: No lower limb edema  Psych:    Not anxious or agitated. Neurologic:  CN 2-12 grossly intact, Alert and oriented X 3. Cranial nerves II-XII are grossly intact except for slightly shallow right nasolabial fold, dysarthria and dysphagia.  No gross sensory deficit.  Motor strength is 5/5 on the LUE and LLE, 0/5 on the RUE (except for 1/5 on the right shoulder), 2 to 2+/5 on RLE (except for 0/5 on the right ankle/foot). Intake and Output:  Current Shift:  No intake/output data recorded.   Last three shifts:  08/23 1901 - 08/25 0700  In: 480 [P.O.:480]  Out: -     Labs: Results:       Chemistry Recent Labs     08/24/19  0637   *      K 3.8      CO2 25   BUN 35*   CREA 1.53*   CA 9.0   AGAP 7   BUCR 23*      CBC w/Diff No results for input(s): WBC, RBC, HGB, HCT, PLT, GRANS, LYMPH, EOS, HGBEXT, HCTEXT, PLTEXT, HGBEXT, HCTEXT, PLTEXT in the last 72 hours. Cardiac Enzymes No results for input(s): CPK, CKND1, DANY in the last 72 hours. No lab exists for component: CKRMB, TROIP   Coagulation No results for input(s): PTP, INR, APTT in the last 72 hours. No lab exists for component: INREXT, INREXT    Lipid Panel Lab Results   Component Value Date/Time    Cholesterol, total 173 08/14/2019 03:42 AM    HDL Cholesterol 39 (L) 08/14/2019 03:42 AM    LDL, calculated 94 08/14/2019 03:42 AM    VLDL, calculated 40 08/14/2019 03:42 AM    Triglyceride 200 (H) 08/14/2019 03:42 AM    CHOL/HDL Ratio 4.4 08/14/2019 03:42 AM      BNP No results for input(s): BNPP in the last 72 hours. Liver Enzymes No results for input(s): TP, ALB, TBIL, AP, SGOT, GPT in the last 72 hours.     No lab exists for component: DBIL   Thyroid Studies Lab Results   Component Value Date/Time    TSH 4.63 (H) 08/19/2019 06:55 AM          Procedures/imaging: see electronic medical records for all procedures/Xrays and details which were not copied into this note but were reviewed prior to creation of Plan    Medications:   Current Facility-Administered Medications   Medication Dose Route Frequency    labetalol (NORMODYNE) tablet 600 mg  600 mg Oral Q12H    spironolactone (ALDACTONE) tablet 50 mg  50 mg Oral BID    baclofen (LIORESAL) tablet 5 mg  5 mg Oral TID    ferrous sulfate tablet 325 mg  1 Tab Oral DAILY WITH BREAKFAST    ascorbic acid (vitamin C) (VITAMIN C) tablet 250 mg  250 mg Oral DAILY WITH BREAKFAST    cyanocobalamin tablet 1,000 mcg  1,000 mcg Oral DAILY    cholecalciferol (VITAMIN D3) capsule 5,000 Units  5,000 Units Oral DAILY    isosorbide dinitrate (ISORDIL) tablet 30 mg  30 mg Oral Q8H    hydrALAZINE (APRESOLINE) tablet 25 mg  25 mg Oral TID PRN    acetaminophen (TYLENOL) tablet 650 mg  650 mg Oral Q4H PRN    bisacodyl (DULCOLAX) tablet 10 mg  10 mg Oral Q48H PRN    heparin (porcine) injection 5,000 Units  5,000 Units SubCUTAneous Q8H    insulin lispro (HUMALOG) injection   SubCUTAneous TIDAC    magnesium oxide (MAG-OX) tablet 400 mg  400 mg Oral DAILY    aspirin chewable tablet 81 mg  81 mg Oral DAILY WITH BREAKFAST    amLODIPine (NORVASC) tablet 10 mg  10 mg Oral DAILY    atorvastatin (LIPITOR) tablet 80 mg  80 mg Oral QHS    hydrALAZINE (APRESOLINE) tablet 75 mg  75 mg Oral Q8H       Assessment/Plan     Principal Problem:    Acute ischemic stroke (HCC) (8/12/2019)      Overview: Acute Ischemic Stroke (acute/early subacute infarct at the left posterior       basal ganglia to corona radiata) with residual right hemiparesis,       dysphagia and dysarthria    Active Problems:    Impaired mobility and ADLs (8/12/2019)      Received intravenous tissue plasminogen activator (tPA) in emergency department (8/12/2019)      Hypertensive heart and kidney disease without heart failure and with stage 2 chronic kidney disease ()      Right hemiparesis (HCC) (8/12/2019)      Dysphagia (8/12/2019)      Dysarthria (8/12/2019)      Chronic hypokalemia ()      Overview: Persistent chronic hypokalemia + hypertension; ?primary hyperaldosteronism      Current use of aspirin (8/14/2019)      On statin therapy due to risk of future cardiovascular event (8/14/2019)      Overview:  On Atorvastatin      Type 2 diabetes mellitus with stage 2 chronic kidney disease (HCC) ()      Overview: HbA1c (8/13/2019) = 6.6      Pure hypercholesterolemia (8/15/2019)      Overview: Lipid profile (8/13/2019) showed , , HDL 42, ; Lipid       profile (8/14/2019) showed , , HDL 39, LDL 94      Refusal of statin medication by patient (8/13/2019)      Overview: As per note of Neurology (Dr. Andie Lewis), patient refuses statin agent       because of potential side effects. Leukocytosis (8/16/2019)      Overview: WBC count (8/16/2019) = 17.9      Vitamin B12 deficiency anemia (8/14/2019)      Overview: Vitamin B12 (8/14/2019) = 208      Iron deficiency anemia (8/14/2019)      Overview: Anemia work-up (8/14/2019) showed serum iron 22, TIBC 371, iron %       saturation 6, ferritin 34, reticulocyte count 1.4            Vitamin D deficiency (8/19/2019)      Overview: Vitamin D 25-Hydroxy (8/19/2019) = 16.2       Primary osteoarthritis of right ankle (8/22/2019)      Overview: X-ray of the right ankle (8/22/2019) showed no acute fracture or       subluxation; advanced degenerative changes at the tibiotalar joint; old       fracture fragment vs. accessory ossicle in the inferior aspect of the       lateral malleolus; soft tissue swelling around the ankle. Acute Ischemic Stroke (acute/early subacute infarct at the left posterior basal ganglia to corona radiata) with residual right hemiparesis, dysphagia and dysarthria; S/P Administration of intravenous tPA (8/12/2019 - [address])              Aspirin 81 mg PO once daily with breakfast              Atorvastatin 80 mg PO q HS    Type 2 diabetes mellitus with stage 2 chronic kidney disease               HbA1c (8/13/2019) = 6.6               Humalog insulin sliding scale SC TID     Question primary hyperaldosteronism   spironolactone  50 mg BID  F/u endocrinology consult pt might have hyperaldosteronism 24 h urine aldosterone pending    F/u BMP    Hypertension  Labetalol 200 mg BID  Norvasc 10 mg daily  Hydralazine 75 mg q 8h  Hydralazine  25 mg q8h prn SBP above 170    Chronic leucocytosis   Continue to monitor lab      DVT prophylaxis heparin SQ.          Miguel Pastrana MD  8/25/2019 12:54  PM

## 2019-08-25 NOTE — ROUTINE PROCESS
SHIFT CHANGE NOTE FOR [facility]    Bedside and Verbal shift change report given to Elicia Benton, RN (oncoming nurse) by Home Medrano RN   (offgoing nurse). Report included the following information SBAR, Kardex, MAR and Recent Results. Situation:   Code Status: Full Code   Reason for Admission: CVA  Hospital Day: 10   Problem List:   Hospital Problems  Date Reviewed: 8/23/2019          Codes Class Noted POA    Primary osteoarthritis of right ankle (Chronic) ICD-10-CM: M19.071  ICD-9-CM: 715.17  8/22/2019 Yes    Overview Signed 8/23/2019 11:54 AM by Cal Hashimoto, MD     X-ray of the right ankle (8/22/2019) showed no acute fracture or subluxation; advanced degenerative changes at the tibiotalar joint; old fracture fragment vs. accessory ossicle in the inferior aspect of the lateral malleolus; soft tissue swelling around the ankle.              Vitamin D deficiency (Chronic) ICD-10-CM: E55.9  ICD-9-CM: 268.9  8/19/2019 Yes    Overview Signed 8/19/2019  1:34 PM by Cal Hashimoto, MD     Vitamin D 25-Hydroxy (8/19/2019) = 16.2              Leukocytosis ICD-10-CM: Y63.092  ICD-9-CM: 288.60  8/16/2019 Yes    Overview Signed 8/16/2019 11:19 AM by Cal Hashimoto, MD     WBC count (8/16/2019) = 17.9             Hypertensive heart and kidney disease without heart failure and with stage 2 chronic kidney disease (Chronic) ICD-10-CM: I13.10, N18.2  ICD-9-CM: 404.90, 585.2  Unknown Yes        Chronic hypokalemia ICD-10-CM: E87.6  ICD-9-CM: 276.8  Unknown Yes    Overview Addendum 8/15/2019 11:31 AM by Cal Hashimoto, MD     Persistent chronic hypokalemia + hypertension; ?primary hyperaldosteronism             Type 2 diabetes mellitus with stage 2 chronic kidney disease ([facility] [address] (Chronic) ICD-10-CM: E11.22, N18.2  ICD-9-CM: 250.40, 585.2  Unknown Yes    Overview Signed 8/15/2019 12:26 PM by Cal Hashimoto, MD     HbA1c (8/13/2019) = 6.6             Pure hypercholesterolemia (Chronic) ICD-10-CM: E78.00  ICD-9-CM: 272.0 8/15/2019 Yes    Overview Addendum 8/15/2019  6:27 PM by Eb Whitfield MD     Lipid profile (8/13/2019) showed , , HDL 42, ; Lipid profile (8/14/2019) showed , , HDL 39, LDL 94             Current use of aspirin ICD-10-CM: Z79.82  ICD-9-CM: V58.66  8/14/2019 Yes        On statin therapy due to risk of future cardiovascular event ICD-10-CM: Z79.899  ICD-9-CM: V58.69  8/14/2019 Yes    Overview Signed 8/15/2019 12:21 PM by Eb Whitfield MD     On Atorvastatin             Vitamin B12 deficiency anemia (Chronic) ICD-10-CM: D51.9  ICD-9-CM: 281.1  8/14/2019 Yes    Overview Signed 8/19/2019  1:24 PM by Eb Whitfield MD     Vitamin B12 (8/14/2019) = 208             Iron deficiency anemia (Chronic) ICD-10-CM: D50.9  ICD-9-CM: 280.9  8/14/2019 Yes    Overview Signed 8/19/2019  1:25 PM by Eb Whitfield MD     Anemia work-up (8/14/2019) showed serum iron 22, TIBC 371, iron % saturation 6, ferritin 34, reticulocyte count 1.4               Refusal of statin medication by patient ICD-10-CM: Z53.29  ICD-9-CM: V64.2  8/13/2019 Yes    Overview Signed 8/15/2019  2:01 PM by Eb Whitfield MD     As per note of Neurology (Dr. Sher Norton), patient refuses statin agent because of potential side effects.               * (Principal) Acute ischemic stroke [facility]) ICD-10-CM: I63.9  ICD-9-CM: 434.91  8/12/2019 Yes    Overview Signed 8/15/2019 12:17 AM by Eb Whitfield MD     Acute Ischemic Stroke (acute/early subacute infarct at the left posterior basal ganglia to corona radiata) with residual right hemiparesis, dysphagia and dysarthria             Impaired mobility and ADLs ICD-10-CM: Z74.09  ICD-9-CM: 799.89  8/12/2019 Yes        Received intravenous tissue plasminogen activator (tPA) in emergency department ICD-10-CM: Z92.82  ICD-9-CM: V45.88  8/12/2019 Yes        Right hemiparesis ([facility] [address] ICD-10-CM: G81.91  ICD-9-CM: 342.90  8/12/2019 Yes        Dysphagia ICD-10-CM: R13.10  ICD-9-CM: 787.20 8/12/2019 Yes        Dysarthria ICD-10-CM: R47.1  ICD-9-CM: 784.51  8/12/2019 Yes              Background:   Past Medical History:   Past Medical History:   Diagnosis Date    Acute ischemic stroke ([facility] [address] 8/12/2019    Acute Ischemic Stroke (acute/early subacute infarct at the left posterior basal ganglia to corona radiata) with residual right hemiparesis, dysphagia and dysarthria    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     On Allopurinol    Chronic hypokalemia     Persistent chronic hypokalemia + hypertension; ?primary hyperaldosteronism    CKD (chronic kidney disease) stage 2, GFR 60-89 ml/min 8/15/2019    Current use of aspirin 8/14/2019    Dysarthria 8/12/2019    Dysphagia 8/12/2019    Elevated prostate specific antigen (PSA) 7/21/2011    First degree atrioventricular block by electrocardiogram 8/12/2019    Hypertensive heart and kidney disease without heart failure and with stage 2 chronic kidney disease     Iron deficiency anemia 8/14/2019    Anemia work-up (8/14/2019) showed serum iron 22, TIBC 371, iron % saturation 6, ferritin 34, reticulocyte count 1.4     Obesity, Class I, BMI 30-34.9     Obstructive sleep apnea on CPAP     On statin therapy due to risk of future cardiovascular event 8/14/2019    On Atorvastatin    Primary osteoarthritis of right ankle 8/22/2019    X-ray of the right ankle (8/22/2019) showed no acute fracture or subluxation; advanced degenerative changes at the tibiotalar joint; old fracture fragment vs. accessory ossicle in the inferior aspect of the lateral malleolus; soft tissue swelling around the ankle.     Pure hypercholesterolemia 08/15/2019    Lipid profile (8/13/2019) showed , , HDL 42, ; Lipid profile (8/14/2019) showed , , HDL 39, LDL 94    Received intravenous tissue plasminogen activator (tPA) in emergency department 8/12/2019    Refusal of statin medication by patient 8/13/2019    As per note of Neurology (Dr. Darryl Cardenas), patient refuses statin agent because of potential side effects.  Right hemiparesis ([address] 8/12/2019    Type 2 diabetes mellitus with stage 2 chronic kidney disease (HCC)     HbA1c (8/13/2019) = 6.6    Vitamin B12 deficiency anemia 8/14/2019    Vitamin B12 (8/14/2019) = 208    Vitamin D deficiency 8/19/2019    Vitamin D 25-Hydroxy (8/19/2019) = 16.2       Patient taking anticoagulants yes Heparin, ASA   Patient has a defibrillator: no     Assessment:   Changes in Assessment throughout shift: No     Patient has central line: no Reasons if yes: Insertion date: Last dressing date:   Patient has Boykin Cath: no Reasons if yes:     Insertion date:     Last Vitals:     Vitals:    08/25/19 0809 08/25/19 1402 08/25/19 1549 08/25/19 1602   BP: 174/75 154/77 129/69 131/71   Pulse: 60 72 95 68   Resp: 18  18 16   Temp: 97.5 °F (36.4 °C)  96.7 °F (35.9 °C) 97.3 °F (36.3 °C)   SpO2: 96%  97% 98%   Weight:       Height:            PAIN    Pain Assessment    Pain Intensity 1: 0 (08/25/19 1546) Pain Intensity 1: 2 (12/29/14 1105)    Pain Location 1: Shoulder Pain Location 1: Abdomen    Pain Intervention(s) 1: Medication (see MAR) Pain Intervention(s) 1: Medication (see MAR)  Patient Stated Pain Goal: 0 Patient Stated Pain Goal: 0  o Intervention effective: no    o Other actions taken for pain:      Skin Assessment  Skin color Skin Color: Appropriate for ethnicity  Condition/Temperature Skin Condition/Temp: Dry, Warm  Integrity Skin Integrity: Intact  Turgor Turgor: Epidermis thin w/ loss of subcut tissue  Weekly Pressure Ulcer Documentation  Pressure  Injury Documentation: No Pressure Injury Noted-Pressure Ulcer Prevention Initiated  Wound Prevention & Protection Methods  Orientation of wound Orientation of Wound Prevention: Posterior  Location of Prevention Location of Wound Prevention: Buttocks, Sacrum/Coccyx  Dressing Present Dressing Present : No  Dressing Status Dressing Status: Intact  Wound Offloading Wound Offloading (Prevention Methods): Adaptive equipment, Bed, pressure reduction mattress, Chair cushion, Elevate heels, Pillows, Repositioning, Turning, Wheelchair     INTAKE/OUPUT  Date 08/24/19 1900 - 08/25/19 0659 08/25/19 0700 - 08/26/19 0659   Shift 0301-5030 24 Hour Total 7803-7190 3273-1337 24 Hour Total   INTAKE   P.O.  480        P. O.  480      Shift Total(mL/kg)  480(4.7)      OUTPUT   Urine(mL/kg/hr)          Urine Occurrence(s)  5 x 4 x  4 x   Stool          Stool Occurrence(s)  1 x 0 x  0 x   Shift Total(mL/kg)        NET  480      Weight (kg) 102 102 102 102 102       Recommendations:  1. Patient needs and requests:     2. Diet: Cardiac Mech Soft Nectar Thick     3. Pending tests/procedures:      4. Functional Level/Equipment:     5. Estimated Discharge Date: 8/24/19 Posted on Whiteboard in Patients Room: yes     HEALS Safety Check    A safety check occurred in the patient's room between off going nurse and oncoming nurse listed above. The safety check included the below items  Area Items   H  High Alert Medications - Verify all high alert medication drips (heparin, PCA, etc.)   E  Equipment - Suction is set up for ALL patients (with diann)  - Red plugs utilized for all equipment (IV pumps, etc.)  - WOWs wiped down at end of shift.  - Room stocked with oxygen, suction, and other unit-specific supplies   A  Alarms - Bed alarm is set for fall risk patients  - Ensure chair alarm is in place and activated if patient is up in a chair   L  Lines - Check IV for any infiltration  - Boykin bag is empty if patient has a Boykin   - Tubing and IV bags are labeled   S  Safety   - Room is clean, patient is clean, and equipment is clean. - Hallways are clear from equipment besides carts. - Fall bracelet on for fall risk patients  - Ensure room is clear and free of clutter  - Suction is set up for ALL patients (with diann)  - Hallways are clear from equipment besides carts.    - Isolation precautions followed, supplies available outside room, sign posted

## 2019-08-26 LAB
ANION GAP SERPL CALC-SCNC: 3 MMOL/L (ref 3–18)
BUN SERPL-MCNC: 30 MG/DL (ref 7–18)
BUN/CREAT SERPL: 20 (ref 12–20)
CALCIUM SERPL-MCNC: 8.6 MG/DL (ref 8.5–10.1)
CHLORIDE SERPL-SCNC: 110 MMOL/L (ref 100–111)
CO2 SERPL-SCNC: 26 MMOL/L (ref 21–32)
CREAT SERPL-MCNC: 1.49 MG/DL (ref 0.6–1.3)
GLUCOSE BLD STRIP.AUTO-MCNC: 116 MG/DL (ref 70–110)
GLUCOSE BLD STRIP.AUTO-MCNC: 118 MG/DL (ref 70–110)
GLUCOSE BLD STRIP.AUTO-MCNC: 122 MG/DL (ref 70–110)
GLUCOSE BLD STRIP.AUTO-MCNC: 159 MG/DL (ref 70–110)
GLUCOSE SERPL-MCNC: 89 MG/DL (ref 74–99)
HCT VFR BLD AUTO: 25.6 % (ref 36–48)
HGB BLD-MCNC: 8.2 G/DL (ref 13–16)
PLATELET # BLD AUTO: 303 K/UL (ref 135–420)
POTASSIUM SERPL-SCNC: 4.4 MMOL/L (ref 3.5–5.5)
SODIUM SERPL-SCNC: 139 MMOL/L (ref 136–145)

## 2019-08-26 PROCEDURE — 65310000000 HC RM PRIVATE REHAB

## 2019-08-26 PROCEDURE — 97110 THERAPEUTIC EXERCISES: CPT

## 2019-08-26 PROCEDURE — 80048 BASIC METABOLIC PNL TOTAL CA: CPT

## 2019-08-26 PROCEDURE — 36415 COLL VENOUS BLD VENIPUNCTURE: CPT

## 2019-08-26 PROCEDURE — 85018 HEMOGLOBIN: CPT

## 2019-08-26 PROCEDURE — 97116 GAIT TRAINING THERAPY: CPT

## 2019-08-26 PROCEDURE — 74011250637 HC RX REV CODE- 250/637: Performed by: INTERNAL MEDICINE

## 2019-08-26 PROCEDURE — 97112 NEUROMUSCULAR REEDUCATION: CPT

## 2019-08-26 PROCEDURE — 92507 TX SP LANG VOICE COMM INDIV: CPT

## 2019-08-26 PROCEDURE — 82043 UR ALBUMIN QUANTITATIVE: CPT

## 2019-08-26 PROCEDURE — 85049 AUTOMATED PLATELET COUNT: CPT

## 2019-08-26 PROCEDURE — 74011636637 HC RX REV CODE- 636/637: Performed by: INTERNAL MEDICINE

## 2019-08-26 PROCEDURE — 82962 GLUCOSE BLOOD TEST: CPT

## 2019-08-26 PROCEDURE — 92526 ORAL FUNCTION THERAPY: CPT

## 2019-08-26 PROCEDURE — 74011250636 HC RX REV CODE- 250/636: Performed by: INTERNAL MEDICINE

## 2019-08-26 RX ORDER — BACLOFEN 10 MG/1
10 TABLET ORAL 3 TIMES DAILY
Status: DISCONTINUED | OUTPATIENT
Start: 2019-08-27 | End: 2019-09-05 | Stop reason: HOSPADM

## 2019-08-26 RX ADMIN — HYDRALAZINE HYDROCHLORIDE 75 MG: 50 TABLET, FILM COATED ORAL at 06:16

## 2019-08-26 RX ADMIN — ISOSORBIDE DINITRATE 30 MG: 20 TABLET ORAL at 21:55

## 2019-08-26 RX ADMIN — ATORVASTATIN CALCIUM 80 MG: 40 TABLET, FILM COATED ORAL at 21:55

## 2019-08-26 RX ADMIN — HEPARIN SODIUM 5000 UNITS: 5000 INJECTION INTRAVENOUS; SUBCUTANEOUS at 06:23

## 2019-08-26 RX ADMIN — LABETALOL HCL 600 MG: 200 TABLET, FILM COATED ORAL at 21:56

## 2019-08-26 RX ADMIN — SPIRONOLACTONE 50 MG: 25 TABLET ORAL at 09:14

## 2019-08-26 RX ADMIN — HEPARIN SODIUM 5000 UNITS: 5000 INJECTION INTRAVENOUS; SUBCUTANEOUS at 21:57

## 2019-08-26 RX ADMIN — FERROUS SULFATE TAB 325 MG (65 MG ELEMENTAL FE) 325 MG: 325 (65 FE) TAB at 09:14

## 2019-08-26 RX ADMIN — HYDRALAZINE HYDROCHLORIDE 75 MG: 50 TABLET, FILM COATED ORAL at 21:56

## 2019-08-26 RX ADMIN — HEPARIN SODIUM 5000 UNITS: 5000 INJECTION INTRAVENOUS; SUBCUTANEOUS at 14:30

## 2019-08-26 RX ADMIN — HYDRALAZINE HYDROCHLORIDE 75 MG: 50 TABLET, FILM COATED ORAL at 14:29

## 2019-08-26 RX ADMIN — EPOETIN ALFA-EPBX 10000 UNITS: 10000 INJECTION, SOLUTION INTRAVENOUS; SUBCUTANEOUS at 22:15

## 2019-08-26 RX ADMIN — BACLOFEN 5 MG: 10 TABLET ORAL at 06:22

## 2019-08-26 RX ADMIN — Medication 250 MG: at 09:14

## 2019-08-26 RX ADMIN — BACLOFEN 5 MG: 10 TABLET ORAL at 17:28

## 2019-08-26 RX ADMIN — INSULIN LISPRO 2 UNITS: 100 INJECTION, SOLUTION INTRAVENOUS; SUBCUTANEOUS at 12:40

## 2019-08-26 RX ADMIN — ISOSORBIDE DINITRATE 30 MG: 20 TABLET ORAL at 14:29

## 2019-08-26 RX ADMIN — ISOSORBIDE DINITRATE 30 MG: 20 TABLET ORAL at 06:16

## 2019-08-26 RX ADMIN — SPIRONOLACTONE 50 MG: 25 TABLET ORAL at 21:55

## 2019-08-26 RX ADMIN — LABETALOL HCL 600 MG: 200 TABLET, FILM COATED ORAL at 09:15

## 2019-08-26 RX ADMIN — Medication 400 MG: at 09:14

## 2019-08-26 RX ADMIN — BACLOFEN 5 MG: 10 TABLET ORAL at 12:39

## 2019-08-26 RX ADMIN — Medication 5000 UNITS: at 09:14

## 2019-08-26 RX ADMIN — AMLODIPINE BESYLATE 10 MG: 10 TABLET ORAL at 09:14

## 2019-08-26 RX ADMIN — ASPIRIN 81 MG 81 MG: 81 TABLET ORAL at 09:14

## 2019-08-26 RX ADMIN — CYANOCOBALAMIN TAB 1000 MCG 1000 MCG: 1000 TAB at 09:15

## 2019-08-26 NOTE — PROGRESS NOTES
Problem: Self Care Deficits Care Plan (Adult)  Goal: *Therapy Goal (Edit Goal, Insert Text)  Description  Occupational Therapy Goals   Long Term Goals  Initiated 2019 and to be accomplished within 4 week(s) (19)  1. Pt will perform self-feeding with MI.  2. Pt will perform grooming with Set-up. 3. Pt will perform UB bathing with SBA. 4. Pt will perform LB bathing with Luba/CGA. 5. Pt will perform tub/shower transfer with Luba/CGA using LRAD. 6. Pt will perform UB dressing with Set-up. 7. Pt will perform LB dressing with Luba/CGA. 8. Pt will perform toileting task with Luba/CGA. 9. Pt will perform toilet transfer with Luba/CGA using LRAD. Short Term Goals   Initiated 2019 and to be accomplished within 7 day(s) (Updated , to be met by 19)  1. Pt will perform self-feeding with S; using compensatory strategies for right hand incorporation into set-up. (GM)  2. Pt will perform simple grooming task, including denture mgmt, with Luba and compensatory strategies as needed. (GM; washing face- pt reporting spouse previously assisted with denture mgmt)  3. Pt will perform UB bathing with Luba using AE as needed. (GM)  4. Pt will perform LB bathing with ModA using AE and compensatory strategies as needed. (GM)  5. Pt will perform tub/shower transfer with modA x 1 <>TTB. (GM)  6. Pt will perform UB dressing with CGA. (Cont for consistency)  7. Pt will perform LB dressing with ModA using AE as needed. (GM)  8. Pt will perform toileting task with ModA. (GM)  9. Pt will perform toilet transfer with ModA x 1 <>3:1 commode. (GM)      Outcome: Progressing Towards Goal   OCCUPATIONAL THERAPY TREATMENT    Patient: Elizabeth Haley   71 y.o.     Patient identified with name and : Yes     Date: 2019    First Tx Session  Time In: 0  Time Out: 18    Second Tx Session  Time In: 1330  Time Out: 1425    Diagnosis: Acute ischemic stroke ([facility] [address] [I63.9]   Precautions: Aspiration  Chart, occupational therapy assessment, plan of care, and goals were reviewed. Pain:  Pt reports 0/10 pain or discomfort prior to treatment. Pt reports 0/10 pain or discomfort post treatment. Intervention Provided: N/A     SUBJECTIVE:   Patient stated Darlene [place] are we going to use that electrode thing again?     OBJECTIVE DATA SUMMARY:     Pt sitting up in w/c in gym ready for therapy session. He requested to utilize e-stim during second tx session as he feels it will help with regaining function in right UE. Good carryover with scapula therex from previous session (Friday). Pt's wife reporting excitement over pt walking with PTA this weekend and using platform walker. Provided education on potential concern for pt's right shoulder with use of platform until pt regains increased function. Further education provided on PT being able to assist with facilitating appropriate muscles to regain function in right LE appropriately to improve gait without use of RW; both verbalized understanding. Second session, pt with wife assisting pt with leaving room for therapy gym. Pt agreeable to additional session this PM after missing 10 mins in previous session. Educated pt and wife on ways to facilitate digital flexion/extension with HOHA using washcloth with focus on grasping/extending digits; wife able to demo assist with pt focus on task to increase NMRE. Education provided during both sessions on pt maximizing potential by slowing down repetitions during therex/ROM and taking rest breaks. Pt also tends to hold his breath when focused on NMRE task. Both pt and wife verbalized understanding.      THERAPEUTIC EXERCISE Daily Assessment   UE therex to increase ROM/NMRE/neuroplasticity of right UE with visual cue of mirror for scapula setting Seated edge of w/c, pt completed 10x3 reps (extended rep 10 secs on last rep) given Min A for right shoulder elevation/depression, scapula retraction, scapula protraction, scapula lateral abduction/adduction (pt assist holding right elbow). Pt then utilized arm skate for right UE ROM on figure 8 completing 10x4 reps with Min A for elbow extension/shoulder abduction/shoulder extension     NMRE Daily Assessment   NMRE of right UE to facilitate neuroplasticity/ROM in prep for functional use  In prep for e-stim/functional reaching task, pt participated in PROM x10 reps each: forearm supination/pronation, wrist flexion/extension, digital flexion/extension. Pt participated in functional e-stim targeting forearm flexors on recommended settings at level 12. Pt challenged to \"hold\" cup during \"on\" times with Mod A to bring to mouth for simulated self-hydration. Instruction to visualize/assist with holding cup and raise UE against gravity to bring to mouth. Following e-stim, pt again participated in PROM x10 reps to wrist/digits. Pt challenged to visualize/assist with right UE functional reach/grasp with min movement noted to right digits. Pt reporting, \"I can't\". Encouragement provided. Pt then provided with wash cloth placed on table top for pt to attempt digital flexion/extension by grasping/squeezing wash cloth in gravity eliminated position. Pt unable to achieve without assistance. Wife instructed on ways to assist pt with HOHA technique to increase NMRE with good carryover of techniques. ASSESSMENT:  Pt continues to remain motivated throughout sessions for recovery. He presented with right scapula elevation/depression against gravity, min shoulder flexion/extension and elbow flexion/extension in gravity eliminated position. Muscle twitching to right forearm/wrist/palm noted during pronation/supination PROM today. Progression toward goals:  ?          Improving appropriately and progressing toward goals  ? Improving slowly and progressing toward goals  ?           Not making progress toward goals and plan of care will be adjusted     PLAN:  Patient continues to benefit from skilled intervention to address the above impairments. Continue treatment per established plan of care. Discharge Recommendations:  Allen Witt  Further Equipment Recommendations for Discharge:  Bariatric 3:1 STEVIE palacios      Activity Tolerance:  Fair; pt required cues for mod rest breaks throughout sessions     Estimated LOS: 09/06/19    Please refer to the flowsheet for vital signs taken during this treatment. After treatment:   ?  Patient left in no apparent distress sitting up in chair with wife assisting pt to room    ? Patient left in no apparent distress in bed  ? Call bell left within reach  ? Nursing notified  ? Caregiver present  ? Bed alarm activated    COMMUNICATION/EDUCATION:   ? Home safety education was provided and the patient/caregiver indicated understanding. ? Patient/family have participated as able in goal setting and plan of care. ? Patient/family agree to work toward stated goals and plan of care. ? Patient understands intent and goals of therapy, but is neutral about his/her participation. ? Patient is unable to participate in goal setting and plan of care.       JUSTIN Kirk/ROVERTO

## 2019-08-26 NOTE — PROGRESS NOTES
[facility]  [address] [place] [address], [address]     INPATIENT REHABILITATION  DAILY PROGRESS NOTE     Date: 8/26/2019    Name: Cordelia Campos Age / Sex: 71 y.o. / male   CSN: [id number] MRN: [id number]   [facility] Date: 8/15/2019 Length of Stay: 11 days     Primary Rehab Diagnosis: Impaired Mobility and ADLs secondary to Acute Ischemic Stroke (acute/early subacute infarct at the left posterior basal ganglia to corona radiata) with residual right hemiparesis, dysphagia and dysarthria      Subjective:     Patient seen and examined. Blood pressure better controlled. Blood glucose controlled. Patient's Complaint:   No significant medical complaints    Pain Control: no current joint or muscle symptoms, essentially pain-free      Objective:     Vital Signs:  Patient Vitals for the past 24 hrs:   BP Temp Pulse Resp SpO2   08/26/19 0724 132/62 98 °F (36.7 °C) 60 14 97 %   08/26/19 0616 -- -- 62 -- --   08/25/19 2100 180/74 97.4 °F (36.3 °C) 75 18 97 %   08/25/19 1602 131/71 97.3 °F (36.3 °C) 68 16 98 %   08/25/19 1549 129/69 96.7 °F (35.9 °C) 95 18 97 %   08/25/19 1402 154/77 -- 72 -- --        Physical Examination:  GENERAL SURVEY: Patient is awake, alert, oriented x 3, sitting comfortably on the chair, not in acute respiratory distress. HEENT: pink palpebral conjunctivae, anicteric sclerae, no nasoaural discharge, moist oral mucosa  NECK: supple, no jugular venous distention, no palpable lymph nodes  CHEST/LUNGS: symmetrical chest expansion, good air entry, clear breath sounds  HEART: adynamic precordium, good S1 S2, no S3, regular rhythm, no murmurs  ABDOMEN: obese, bowel sounds appreciated, soft, non-tender  EXTREMITIES: pink nailbeds, no edema except for right ankle swelling, full and equal pulses, no calf tenderness   NEUROLOGICAL EXAM: The patient is awake, alert and oriented x3, able to answer questions fairly appropriately, able to follow 1 and 2 step commands.   Able to tell time from the wall clock. Cranial nerves II-XII are grossly intact except for slightly shallow right nasolabial fold, dysarthria and dysphagia. No gross sensory deficit. Motor strength is 5/5 on the LUE and LLE, 0/5 on the RUE (except for 1/5 on the right shoulder), 2+/5 on RLE (except for 0/5 on the right ankle/foot).       Current Medications:  Current Facility-Administered Medications   Medication Dose Route Frequency    labetalol (NORMODYNE) tablet 600 mg  600 mg Oral Q12H    spironolactone (ALDACTONE) tablet 50 mg  50 mg Oral BID    baclofen (LIORESAL) tablet 5 mg  5 mg Oral TID    ferrous sulfate tablet 325 mg  1 Tab Oral DAILY WITH BREAKFAST    ascorbic acid (vitamin C) (VITAMIN C) tablet 250 mg  250 mg Oral DAILY WITH BREAKFAST    cyanocobalamin tablet 1,000 mcg  1,000 mcg Oral DAILY    cholecalciferol (VITAMIN D3) capsule 5,000 Units  5,000 Units Oral DAILY    isosorbide dinitrate (ISORDIL) tablet 30 mg  30 mg Oral Q8H    hydrALAZINE (APRESOLINE) tablet 25 mg  25 mg Oral TID PRN    acetaminophen (TYLENOL) tablet 650 mg  650 mg Oral Q4H PRN    bisacodyl (DULCOLAX) tablet 10 mg  10 mg Oral Q48H PRN    heparin (porcine) injection 5,000 Units  5,000 Units SubCUTAneous Q8H    insulin lispro (HUMALOG) injection   SubCUTAneous TIDAC    magnesium oxide (MAG-OX) tablet 400 mg  400 mg Oral DAILY    aspirin chewable tablet 81 mg  81 mg Oral DAILY WITH BREAKFAST    amLODIPine (NORVASC) tablet 10 mg  10 mg Oral DAILY    atorvastatin (LIPITOR) tablet 80 mg  80 mg Oral QHS    hydrALAZINE (APRESOLINE) tablet 75 mg  75 mg Oral Q8H       Allergies:  No Known Allergies      Lab/Data Review:  Recent Results (from the past 24 hour(s))   GLUCOSE, POC    Collection Time: 08/25/19  4:52 PM   Result Value Ref Range    Glucose (POC) 110 70 - 110 mg/dL   GLUCOSE, POC    Collection Time: 08/25/19  8:06 PM   Result Value Ref Range    Glucose (POC) 160 (H) 70 - 873 mg/dL   METABOLIC PANEL, BASIC    Collection Time: 08/26/19  6:08 AM   Result Value Ref Range    Sodium 139 136 - 145 mmol/L    Potassium 4.4 3.5 - 5.5 mmol/L    Chloride 110 100 - 111 mmol/L    CO2 26 21 - 32 mmol/L    Anion gap 3 3.0 - 18 mmol/L    Glucose 89 74 - 99 mg/dL    BUN 30 (H) 7.0 - 18 MG/DL    Creatinine 1.49 (H) 0.6 - 1.3 MG/DL    BUN/Creatinine ratio 20 12 - 20      GFR est AA 57 (L) >60 ml/min/1.73m2    GFR est non-AA 47 (L) >60 ml/min/1.73m2    Calcium 8.6 8.5 - 10.1 MG/DL   HGB & HCT    Collection Time: 08/26/19  6:08 AM   Result Value Ref Range    HGB 8.2 (L) 13.0 - 16.0 g/dL    HCT 25.6 (L) 36.0 - 48.0 %   PLATELET COUNT    Collection Time: 08/26/19  6:08 AM   Result Value Ref Range    PLATELET 924 283 - 489 K/uL   GLUCOSE, POC    Collection Time: 08/26/19  7:30 AM   Result Value Ref Range    Glucose (POC) 116 (H) 70 - 110 mg/dL   GLUCOSE, POC    Collection Time: 08/26/19 11:23 AM   Result Value Ref Range    Glucose (POC) 159 (H) 70 - 110 mg/dL       Estimated Glomerular Filtration Rate:  On admission, estimated GFR based on a Creatinine of 1.20 mg/dl:              Using CKD-EPI = 63.1 mL/min/1.73m2              Using MDRD = 64.6 mL/min/1.73m2  Most recent estimated GFR, based on a Creatinine of 1.49 mg/dl on 8/26/2019:   Using CKD-EPI = 48.6 mL/min/1.73m2   Using MDRD = 57.4 mL/min/1.73m2       Assessment:     Primary Rehabilitation Diagnosis  1. Impaired Mobility and ADLs  2.  Acute Ischemic Stroke (acute/early subacute infarct at the left posterior basal ganglia to corona radiata) with residual right hemiparesis, dysphagia and dysarthria     Comorbidities   Obesity, Class I, BMI 30-34.9 E66.9    Obstructive sleep apnea on CPAP G47.33, Z99.89    Hypertensive heart and kidney disease without heart failure and with stage 2 chronic kidney disease I13.10, N18.2    Chronic hypokalemia E87.6    CKD (chronic kidney disease) stage 2, GFR 60-89 ml/min N18.2    Current use of aspirin Z79.82    On statin therapy due to risk of future cardiovascular event Z79.899    Type 2 diabetes mellitus with stage 2 chronic kidney disease  E11.22, N18.2    Pure hypercholesterolemia E78.00    Elevated prostate specific antigen (PSA) R97.20    Refusal of statin medication by patient Z48.34    First degree atrioventricular block by electrocardiogram I44.0    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 20X0    Leukocytosis D72.829    Iron deficiency anemia D50.9    Vitamin B12 anemia D51.9    Vitamin D deficiency E55. 9        Plan:     1. Justification for continued stay: Good progression towards established rehabilitation goals. 2. Medical Issues being followed closely:    [x]  Fall and safety precautions     []  Wound Care     [x]  Bowel and Bladder Function     [x]  Fluid Electrolyte and Nutrition Balance     []  Pain Control      3. Issues that 24 hour rehabilitation nursing is following:    [x]  Fall and safety precautions     []  Wound Care     [x]  Bowel and Bladder Function     [x]  Fluid Electrolyte and Nutrition Balance     []  Pain Control      [x]  Assistance with and education on in-room safety with transfers to and from the bed, wheelchair, toilet and shower. 4. Acute rehabilitation plan of care:    [x]  Continue current care and rehab. [x]  Physical Therapy           [x]  Occupational Therapy           [x]  Speech Therapy     []  Hold Rehab until further notice     5. Medications:    [x]  MAR Reviewed     [x]  Continue Present Medications     6. DVT Prophylaxis:      []  Lovenox     [x]  Unfractionated Heparin     []  Coumadin     []  NOAC     []  SUZAN Stockings     []  Sequential Compression Device     []  None     7.  Orders:   > Acute Ischemic Stroke (acute/early subacute infarct at the left posterior basal ganglia to corona radiata) with residual right hemiparesis, dysphagia and dysarthria; S/P Administration of intravenous tPA (8/12/2019 - [address])   > On 8/22/2019, started Baclofen 5 mg PO TID   > Continue:    > Aspirin 81 mg PO once daily with breakfast    > Atorvastatin 80 mg PO q HS    > Increase Baclofen from 5 mg to 10 mg PO TID    > Chronic hypokalemia    Serum Potassium during hospital stay at [facility]      08/15/19  0330 08/14/19  1848 08/14/19  0342 08/13/19  1205 08/12/19  2328   K 3.0* 2.8* 2.7* 2.8* 2.4*          > K (8/16/2019, on admission) = 3.8   > Mg (8/16/2019, on admission) = 1.9   > Upon admission to the ARU, patient was given Klor Con 40 meq PO BID with meals x 2 days   > K (8/17/2019) = 3.5   > K (8/18/2019) = 3.7   > K (8/19/2019) = 3.5   > K (8/22/2019) = 3.6   > Mg (8/22/2019) = 2.2   > On 8/22/2019, started Spironolactone 50 mg PO BID   > K (8/24/2019) = 3.8   > K (8/26/2019) = 4.4   > Will need to monitor serum potassium levels closely as patient is at high risk for hyperkalemia due slowly rising serum potassium levels since starting high-dose Spironolactone; planned to add HCTZ or Furosemide to offset the potassium-sparing characteristics of Spironolactone but will hold off due to rising BUN/Creatinine levels   > Continue:    > Spironolactone 50 mg PO BID    > Mg(OH)2 400 mg PO once daily    > Hypertensive heart and kidney disease without heart failure and with stage 2 chronic kidney disease   > Upon admission to the ARU, increased Hydralazine from 50 mg to 75 mg PO q 8 hr (6AM, 2PM, 10PM)   > Once work-up for primary hyperaldosteronism is complete, plan to start Spironolactone 25 mg PO once daily   > On 8/16/2019:    > Discontinued Metoprolol succinate 50 mg PO once daily (6AM)    > Started:     > Labetalol 200 mg PO q 12 hr (9AM, 9PM)     > Isosorbide dinitrate 10 mg P) q 8 hr (6AM, 2PM, 10PM)   > On 8/18/2019, added Hydralazine 25 mg PO TID PRN for SBP greater than 170 mmHg   > On 8/19/2019:    > Increased Labetalol from 200 mg to 300 mg PO q 12 hr (9AM, 9PM)    > Increased Isosorbide dinitrate from 10 mg to 20 mg PO q 8 hr (6AM, 2PM, 10PM)   > On 8/20/2019:    > Increased Labetalol from 300 mg to 400 mg PO q 12 hr (9AM, 9PM)    > Increased Isosorbide dinitrate from 20 mg to 30 mg PO q 8 hr (6AM, 2PM, 10PM)   > On 8/22/2019:    > Increased Labetalol from 400 mg to 600 mg PO q 12 hr (9AM, 9PM)    > Started Spironolactone 50 mg PO BID   > Continue:    > Amlodipine 10 mg PO once daily (9AM)    > Hydralazine 75 mg PO q 8 hr (6AM, 2PM, 10PM)    > Labetalol 600 mg PO q 12 hr (9AM, 9PM)    > Isosorbide dinitrate 30 mg PO q 8 hr (6AM, 2PM, 10PM)    > Spironolactone 50 mg PO BID    > Hydralazine 25 mg PO TID PRN for SBP greater than 170 mmHg    > Pure hypercholesterolemia   > Lipid profile (8/13/2019) showed , , HDL 42,    > Lipid profile (8/14/2019) showed , , HDL 39, LDL 94   > Continue Atorvastatin 80 mg PO q HS    > Type 2 diabetes mellitus with stage 2 chronic kidney disease   > HbA1c (4/16/2014) = 6.2   > Patient has had chronic kidney disease even before his diagnosis of Type 2 diabetes mellitus and is presumed to be due to prolonged uncontrolled hypertension   > HbA1c (8/13/2019) = 6.6   > Continue Humalog insulin sliding scale SC TID AC    >  ? Primary hyperaldosteronism as etiology of chronic hypokalemia and difficult to control hypertension   > Endocrinology consult (Dr. Gu Isola) was called at [facility] prior to discharge/transfer to the ARU   > Aldosterone/Renin activity (8/16/2019):    > Aldosterone = 8.8 (NORMAL)    > Renin Activity = 0.300 (NORMAL)    > Aldosterone/Renin Activity = 29 (ELEVATED) ~ EQUIVOCAL, requires confirmation    > Last intake of Losartan was on 8/11/2019 (prior to admission)    > Endocrinology consult (Dr. Reji Cross):    > ASSESSMENT:     1. Chronic hypokalemia. 2.  Diabetes mellitus. 3.  Diabetic nephropathy with a stage II chronic renal disease. 4.  Normocytic anemia.      5.  Vitamin B12 deficiency. 6.  Borderline iron deficiency. 7.  Hypoalbuminemia.     > DISCUSSION:  The patient may well have hyperaldosteronism, but he may have it on the basis of his nephropathy, which is due to his underlying diabetes. The cause of his anemia might be a combination of B12 deficiency and iron deficiency together, so investigation should be done for this.     > PLAN: Laboratory studies:     1.  24-hour urine for aldosteronism (8/20/2019) = 12.78 (N.V. = 0-19) (NORMAL)     2. Methylmalonic acid level (8/18/2019) = 468 (ELEVATED)     3.  Gastrin level (8/20/2019) = <10 (NORMAL)     4. Microalbumin-to-creatinine ratio. 5.  Transferrin and prealbumin levels. > FSH (8/19/2019) = 5.6 (NORMAL)    > LH (8/18/2019) = 6.8 (NORMAL)    > Free T4 (8/18/2019) = 1.0 (NORMAL)    > TSH (8/18/2019) = 4.63 (ELEVATED)    > Urine aldosterone (8/20/2019): 11.1 ug/L    > Transferrin (8/22/2019) = 275   > Excerpt from the Progress Note (dated 8/22/2019) by Dr. Mohinder Phoenix:    > \"Griffin level is not elevated. 24 hour urine griffin is pending. The aldosterone/renin level is not high. If he has primary aldosteronism, it is very likely to be idiopathic rather an an aldosterone producing adenoma. This is susceptible to medical therapy. Will start spironolactone at 50 mg twice daily. \"    > On 8/22/2019, started Spironolactone 50 mg PO BID   > Unable to proceed with plasma aldosterone confirmatory tests as patient had been started on high-dose Spironolactone; unable to determine presence of adrenal adenoma in the absence of imaging studies   > Continue Spironolactone 50 mg PO BID    > Leukocytosis, resolved   > WBC count (8/15/2019) = 11.7   > No fever documented since admission to the ARU   > WBC count (8/16/2019, on admission) = 17.9   > Work-up:    > Urinalysis (8/16/2019): WNL    > Chest x-ray (PA-Lateral) (8/16/2019) showed a minimally blunted left posterior costophrenic angle could be due to either a tiny effusion or chronic pleural reaction/scarring; mild cardiomegaly; atherosclerosis.    > WBC count (8/18/2019) = 11.6    > Iron deficiency anemia / Vitamin B12 anemia   > Anemia work-up (8/14/2019) showed serum iron 22, TIBC 371, iron % saturation 6, ferritin 34, reticulocyte count 1.4   > Vitamin B12 (8/14/2019) = 208   > Hgb/Hct (8/15/2019) = 10.1/31.3    > Hgb/Hct (8/16/2019, on admission) = 10.7/32.6   > Hgb/Hct (8/18/2019) = 8.7/27.4   > Hgb/Hct (8/19/2019) = 8.4/25.2   > On 8/19/2019, started:     > FeSO4 325 mg PO once daily with breakfast     > Ascorbic Acid 250 mg PO once daily with breakfast (to enhance the absorption of the FeSO4)     > Cyanocobalamin 1,000 mcg PO once daily    > Epoetin tone 10,000 units SC x 1 dose   > Hgb/Hct (8/22/2019) = 8.6/26.3   > On 8/22/2019, Epoetin tone 10,000 units SC x 1 dose     > Hgb/Hct (8/26/2019) = 8.2/25.6   > Epoetin tone 10,000 units SC x 1 dose today    > Continue:    > FeSO4 325 mg PO once daily with breakfast     > Ascorbic Acid 250 mg PO once daily with breakfast (to enhance the absorption of the FeSO4)     > Cyanocobalamin 1,000 mcg PO once daily    > Vitamin D Deficiency   > PTH (8/18/2019) = 101.7   > Vitamin D 25-Hydroxy (8/19/2019) = 16.2   > On 8/19/2019, patient was given Cholecalciferol 50,000 units PO x 1 dose    > On 8/20/2019, started Cholecalciferol 5,000 units PO once daily   > Continue Cholecalciferol 5,000 units PO once daily    > Right ankle swelling, most likely dependent edema due to hemiparesis/lack of muscle contraction and osteoarthritis of the right ankle   > (+) nonpainful swelling of the right ankle for the past few days; denies any trauma   > X-ray of the right ankle (8/22/2019) showed no acute fracture or subluxation; advanced degenerative changes at the tibiotalar joint; old fracture fragment vs. accessory ossicle in the inferior aspect of the lateral malleolus; soft tissue swelling around the ankle.   > Elevate right leg/foot when supine in bed    > Stage 2 chronic kidney disease    > On admission, estimated GFR based on a Creatinine of 1.20 mg/dl:               > Using CKD-EPI = 63.1 mL/min/1.73m2               > Using MDRD = 64.6 mL/min/1.73m2   > Most recent estimated GFR, based on a Creatinine of 1.49 mg/dl on 8/26/2019:    > Using CKD-EPI = 48.6 mL/min/1.73m2    > Using MDRD = 57.4 mL/min/1.73m2    > Will call Nephrology consult in AM for evaluation and comanagement    > Diet:   > On 8/16/2019, solids consistency was advanced from Mechanical soft (NDD 2) to Soft solids (NDD 3)   > Specifications: Diabetic, low saturated fat   > Solids (consistency): Soft solids (NDD 3)   > Liquids (consistency): Mildly thick (Hyder-thick)       8. Patient's progress in rehabilitation and medical issues discussed with the patient. All questions answered to the best of my ability. Care plan discussed with patient, wife and nurse.       Signed:    Corinne Acosta, MD    August 26, 2019

## 2019-08-26 NOTE — PROGRESS NOTES
Problem: Mobility Impaired (Adult and Pediatric)  Goal: *Acute Goals and Plan of Care (Insert Text)  Description  Physical Therapy Short Term Goals  Initiated 2019 and to be accomplished within 14 day(s)  1. Patient will move from supine to sit and sit to supine  in bed with minimal assistance/contact guard assist.     2.  Patient will transfer from bed to chair and chair to bed with minimal/moderate assistance using the least restrictive device. 3.  Patient will perform sit to stand with minimal assistance/moderate assistance. 4.  Patient will ambulate with moderate assistance  for 10 feet with the least restrictive device. Physical Therapy Goals  Initiated 2019 and to be accomplished within 4 weeks  1. Patient will move from supine to sit and sit to supine  in bed with supervision/set-up. 2.  Patient will transfer from bed to chair and chair to bed with supervision/set-up using the least restrictive device. 3.  Patient will perform sit to stand with supervision/set-up. 4.  Patient will ambulate with minimal assistance for 50 feet with the least restrictive device. 5.  Patient will ascend/descend 2 stairs with 1 handrail(s) with moderate assistance . Outcome: Progressing Towards Goal   PHYSICAL THERAPY TREATMENT    Patient: Krystal Kumar (36 y.o. male)  Date: 2019  Diagnosis: Acute ischemic stroke [facility]) [I63.9] Acute ischemic stroke [facility])  Precautions: aspiration  Chart, physical therapy assessment, plan of care and goals were reviewed. Time In: 0805  Time Out: 2425  Time In: 9144  Time Out: 6495  Patient Seen For: Gait training; Therapeutic exercise;Patient education;Transfer training;Balance activities  Pain:  Pt pain was reported as  0 pre-treatment. Pt pain was reported as 0 post-treatment. Intervention: n/a    Patient identified with name and :yes    SUBJECTIVE:     \"I don't feel like I'm getting better. \"    OBJECTIVE DATA SUMMARY:   Objective:     GROSS ASSESSMENT Daily Assessment            BED/MAT MOBILITY Daily Assessment    Supine to Sit : 4 (Minimal assistance)  Sit to Supine : 4 (Minimal assistance)       TRANSFERS Daily Assessment    Other: stand step without AD  Transfer Assistance : 4 (Minimal assistance)  Sit to Stand Assistance: (mod A from w/c; CGA from mat)       GAIT Daily Assessment    Amount of Assistance: 4 (Minimal assistance)  Distance (ft): 20 Feet (ft)  Assistive Device: Cane, quad;Gait belt    Additional trials 10 feet with quad cane min/mod A due to stepping with left leg before right LE was ready to accept weight;  6 feet without assistive device with min/mod A to assist with decreasing step length on the right, cues needed to prevent increased trunk flexion with fatigue       STEPS or STAIRS Daily Assessment     stepping up onto 2 inch step with right LE to increase quad and gluteal contraction for increased stability with ambulation. Pt required min A for foot placement and performed extending hip and knee to raise up 10 times with min A for balance and quad cane for support on left LE and then 10 more times after a rest break. BALANCE Daily Assessment    Sitting - Static: Good (unsupported)  Standing - Static: (fair-)  Standing - Dynamic : Impaired       WHEELCHAIR MOBILITY Daily Assessment    Able to Propel (ft): 300 feet  Functional Level: 5       THERAPEUTIC EXERCISES Daily Assessment    Extremity: Both  Exercise Type #1: Supine lower extremity strengthening  Sets Performed: 2  Reps Performed: 15  Level of Assist: Minimal assistance           ASSESSMENT:  Pt demonstrated increased ability to slow down and stabilize right LE extremity prior to stepping with the left. Progression toward goals:  ?      Improving appropriately and progressing toward goals  X      Improving slowly and progressing toward goals  ?       Not making progress toward goals and plan of care will be adjusted     PLAN:  Patient continues to benefit from skilled intervention to address the above impairments. Continue treatment per established plan of care. Discharge Recommendations:  [facility]  Further Equipment Recommendations for Discharge:  N/A     Estimated LOS:9/6/19    Activity Tolerance:   Fair+  Please refer to the flowsheet for vital signs taken during this treatment.     After treatment:   Patient left in w/c propelling down [place] to lunch with wife      Anne Dodson, PT  8/26/2019

## 2019-08-26 NOTE — PROGRESS NOTES
[x] Psychology  [] Social Work [] Recreational Therapy    INTERVENTION  UNITS/TIME OF SERVICE   Assessment    Supportive Counseling August 23, 2019   Orientation    Discharge Planning    Resource Linkage              Progress/Current Status    Patient attended stroke support group on ARU and was thoroughly educated about all aspects of stroke occurrence and recovery with emphasis on risk factors for recurrence and health maintenance for long term stability. Patient was able to sustain attention to task and made several comments about changes he can consider post hospital, such as dietary improvement. Patient is not presenting with any evidence of acute distress at this time and maintained calm and composure.     Mandy Damon, THE [facility] 8/26/2019 10:45 AM

## 2019-08-26 NOTE — PROGRESS NOTES
conducted a Follow up consultation and Spiritual Assessment for Sumeet Poe, who is a 71 y. o.,male. The  provided the following Interventions:  Continued the relationship of care and support. Patient was in the dining wyatt about to finish his breakfast when I talked to him. Patient indicated that he is doing so much better with his therapies and his wife's daily support. Offered assurance of continued prayer on patient's behalf. Chart reviewed. The following outcomes were achieved:  Patient expressed gratitude for 's visit. Assessment:  There are no further spiritual or Rastafarian issues which require Spiritual Care Services interventions at this time. Plan:  Chaplains will continue to follow and will provide pastoral care on an as needed/requested basis.  recommends bedside caregivers page  on duty if patient shows signs of acute spiritual or emotional distress.        [address] [facility]   [phone]

## 2019-08-26 NOTE — PROGRESS NOTES
Problem: Dysphagia (Adult)  Goal: *Speech Goal: (INSERT TEXT)  Description  Long term goals  Patient will:  1. Tolerate regular diet with thin liquids using safe swallowing techniques without s/s of aspiration in 5/6 meals. 2. Use safe swallowing techniques (including slowed rate, small bites/sips, alternate liquids/solids, effortful swallow, head rotation to right for liquids) with supervision. 3. Participate in MBS study prior to advancing to thin liquids to assure safety (no aspiration). 4. Perform oral and pharyngeal strengthening exercises (to improve speech and swallowing) with supervision-modified independence. 5. Demonstrate 90% intelligibility in conversation using appropriate speaking strategies (slowed rate, overarticulation, increased volume). 6. Recall 3 words after 5 minutes with supervision. 7. Perform functional problem solving/reasoning tasks with 90% accuracy. 8. Name 8-10 items in categories of increasing abstraction, supervision. Short term goals (by 8/30/19)  Patient will:   1. Tolerate soft diet with nectar thick liquids using safe swallowing techniques without s/s of aspiration in 5/6 meals. 2. Use safe swallowing techniques (including slowed rate, small bites/sips, alternate liquids/solids, effortful swallow, head rotation to right for liquids) with min cues. 3. Tolerate small amounts of ice chips and water with the SLP using head rotation to the right and/or chin tuck, without overt s/s of aspiration. 4. Perform oral and pharyngeal strengthening exercises (to improve speech and swallowing) with min assist.  5. Demonstrate 90% intelligibility in polysyllabic words and sentences using appropriate speaking strategies (slowed rate, overarticulation, increased volume). 6. Recall 3 words after 5 minutes with mod assist.  7. Perform functional problem solving/reasoning tasks with 75-85% accuracy. 8. Name 8-10 items in concrete categories, supervision.       Note:   SPEECH LANGUAGE PATHOLOGY TREATMENT    Patient: Indiana Miller (63 y.o. male)  Date: 8/26/2019  Diagnosis: Acute ischemic stroke [facility]) [I63.9] Acute ischemic stroke [facility])       Time in: [id number]  Time Out:  [id number]  SUBJECTIVE:   Patient stated I understand. OBJECTIVE:   Mental Status:  Mr. Chelita Newman was awake and alert in today's treatment sessions. Treatment & Interventions:   Patient was seen in the dining room at mealtime for breakfast and lunch today. He continues to receive an advanced soft diet with nectar thick liquids. Mr. Chelita Newman is still impulsive while eating and needs moderate cuing for use of safe swallowing techniques (slowed rate, small bites/sips, alternate liquids and solids, clear mouth prior to adding more , effortful swallow). Patient managed his breakfast fairly well (with cues). However, at lunchtime he was less focused (due to visitors) and demonstrated throat clearing and cough toward the end of the meal.  Patient also continues to be irritated with the SLP at times for providing needed cues. In a thirty minute speech therapy session this afternoon,  the following treatment tasks were presented: Motor Speech:   Review of speaking strats: Min assist  Production of strong /g/: 80% accuracy in words  Review of selected oral ex: Min assist  Trials with small ice boluses: Good tolerance with head rotation to right  Trials with 5cc of water: Patient needed cues for prompt head rotation      Throat clearing and cough after just a few boluses  Neuro-Linguistics:   Orientation:   Independent  Recent memory:  Min assist  Recall 3 words:  Supervision    Response & Tolerance to Activities:  Mr. Chelita Newman is motivate to improve speech and swallowing. However, moderate impulsivity is a barrier in both of these areas. Patient's wife is very supportive and can help to cue for safe swallowing techniques.     Pain:  Pain Scale 1: Numeric (0 - 10)  No report of pain     After treatment:   ?       Patient left in no apparent distress sitting up in chair  ? Patient left in no apparent distress in bed  ? Call bell left within reach  ? Nursing notified  ? Caregiver present  ? Bed alarm activated    ASSESSMENT:   Progression toward goals:  ?       Improving appropriately and progressing toward goals  ? Improving slowly and progressing toward goals  ? Not making progress toward goals and plan of care will be adjusted    PLAN:   Patient continues to benefit from skilled intervention to address the above impairments. Continue treatment per established plan of care.   Discharge Recommendations:  [facility]    Estimated LOS: Through 9/6/19    SILVERIO Zee  Time Calculation:  95 Minutes

## 2019-08-26 NOTE — INTERDISCIPLINARY ROUNDS
[facility] PHYSICAL REHABILITATION  [address] [place] [address], [address]    INPATIENT REHABILITATION  PRE-TEAM CONFERENCE SUMMARY     Date of Conference: 8/27/19    Patient Information:        Name: Ranulfo Mcclendon Age / Sex: 71 y.o. / male   CSN: [id number] MRN: [id number]   [facility] Date: 8/15/2019 Length of Stay: 11 days     Primary Rehabilitation Diagnosis  1. Impaired Mobility and ADLs  2. Acute Ischemic Stroke (acute/early subacute infarct at the left posterior basal ganglia to corona radiata) with residual right hemiparesis, dysphagia and dysarthria     Comorbidities   Obesity, Class I, BMI 30-34.9 E66.9    Obstructive sleep apnea on CPAP G47.33, Z99.89    Hypertensive heart and kidney disease without heart failure and with stage 2 chronic kidney disease I13.10, N18.2    Chronic hypokalemia E87.6    CKD (chronic kidney disease) stage 2, GFR 60-89 ml/min N18.2    Current use of aspirin Z79.82    On statin therapy due to risk of future cardiovascular event Z79.899    Type 2 diabetes mellitus with stage 2 chronic kidney disease  E11.22, N18.2    Pure hypercholesterolemia E78.00    Elevated prostate specific antigen (PSA) R97.20    Refusal of statin medication by patient Z48.34    First degree atrioventricular block by electrocardiogram I44.0    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 20X0    Leukocytosis D72.829    Iron deficiency anemia D50.9    Vitamin B12 anemia D51.9    Vitamin D deficiency E55. 9        Therapy:     FIM SCORES Initial Assessment Weekly Progress Assessment 8/26/2019   Eating Functional Level: 5  Comments: Pt reporting requiring set-up of container mgmt for self feeding. Pt able to use dominant left hand to manage utensils.   5 (8/23)   Swallowing     Grooming 3  5 (8/23)   Bathing 2  4 (8/23)      Upper Body Dressing Functional Level: 4  Items Applied/Steps Completed: Pullover (4 steps)  Comments: Pt educated on karthik technique to cecily shirt with Michael overall. Pt doffed shirt with SBA.   4 (8/23)   Lower Body Dressing Functional Level: 2  Items Applied/Steps Completed: Elastic waist pants (3 steps), Sock, left (1 step), Sock, right (1 step), Underpants (3 steps)  Comments: Pt doffed socks with modA, donned socks with TA. LB clothing score reflected from pt performing toileting task, requiring maxA for clothing mgmt   4 (8/23)   Toileting Functional Level: 2  Comments: Pt able to initiate to wipe but requiring MaxA for thoroughness. Pt required maxA overall for clothing mgmt with pt initiating to assist. Pt encouraged to focus on steadying balance in standing with additional person present assisting for clothing and buttocks cleansing.   4 (Mod I urinal, Min A BM hygiene) (8/23)   Bladder - level of assist 5     Bladder - accident frequency score 6     Bowel - level of assist 2     Bowel - accident frequency score 6     Toilet Transfer [place] Toilet Transfer Score: 1  Comments: modA x 2. Second staff member present for safety and (A) with initial sit<>stand. 3 (SPT 8/23)   Tub/Shower Transfer [place]urry Tub or Shower Type: Tub/Shower combination  Tub/Shower Transfer Score: 0  Comments: NT 2/2 to safety and decreased functional transfer (I).  Bathing performed at sink this AM  3 (SPT 8/23)      Comprehension Primary Mode of Comprehension: Auditory  Score: 4  Auditory   5      Expression Primary Mode of Expression: Verbal  Score: 4  Comments: Pt requiring increased time and repetitions to make needs known 2/2 to facial droop impeding expression   Verbal 5      Social Interaction Score: 4  Comments: Pt interacting appropriately with OT    5   Problem Solving Score: 4  Comments: Pt requiring Michael for problem solving body mechanics prior to toileting transfer    5   Memory Score: 4  4     FIM SCORES Initial Assessment Weekly Progress Assessment 8/26/2019   Bed/Chair/Wheelchair Transfers Transfer Type: (stand step without assistive device)  Other: stand step txfr without AD  Transfer Assistance : 2 (Maximal assistance)  Sit to Stand Assistance: Maximum assistance     Bed Mobility Rolling Right 6 (Modified independent)   Rolling Left 3 (Moderate assistance )   Supine to Sit 3 (Moderate assistance)   Sit to Stand Maximum assistance   Sit to Supine 3 (Moderate assistance)    Rolling Right    6 mod I   Rolling Left    4 min A   Supine to Sit    4 min A/CGA   Sit to Stand    CGA from 23 inch seat  Mod A from 20 inch seat   Sit to Supine    min A      Locomotion (W/C) Able to Propel (ft): 300 feet  Functional Level: 5  Curbs/Ramps Assist Required (FIM Score): 0 (Not tested)  Wheelchair Setup Assist Required : 3 (Moderate assistance)(for right brake and leg rest)  Wheelchair Management: Manages left brake Function  propels >300 feet with supervision on level surface    Setup Assistance- patient manages B brakes, requires assistance for lap tray and right leg rest         Locomotion (W/C distance)    >300 feet   Locomotion (Walk) 2 (Maximal assistance)  2      Locomotion (Walk dist.) 3 Feet (ft)  20 feet with WBQC and min/mod A   Steps/Stairs    NT         Nursing:     Neuro:   AAA&O x  4          Respiratory:   [x] WNL   [] O2 LPM:   Other:  Peripheral Vascular:   [] TEDS present   [x] Edema present _2+___ Grade   Cardiac:   [x] WNL   [] Other  Genitourinary:   [x] continent   [] incontinent   [] perkins  Abdominal __8/26/19_____ LBM  GI: _cardiac soft______ Diet ___nectar___ Liquids _____ tube feeds  Musculoskeletal: ____ ROM Transfers _wheelchair____ Assistive Device Used  _x1___ Level of Assistance  Skin Integumentary:   [x] Intact   [] Not Intact   __________Preventative Measures  Details______________________________________________________________  Pain: [x] Controlled   [] Not Controlled   Pain Meds:   [] Scheduled   [] PRN        Registered Dietitian / Nutrition:     Patient Vitals for the past 100 hrs:   % Diet Eaten   08/24/19 1318 100 %   08/24/19 0932 100 %   08/23/19 1829 100 %   08/23/19 1257 100 %   08/23/19 0934 100 %   08/22/19 1805 100 %   08/22/19 1255 100 %   08/22/19 0952 100 %     Pt reported excellent appetite and meal intake. Tolerating diet. Denied having any concerns at time of visit. Supplements:          [] Yes   [x] No      Amount of supplement consumed:  Not applicable     No intake or output data in the 24 hours ending 08/26/19 0958                             Last bowel movement: 8/25      Interdisciplinary Team Goals:     1. Discipline  Physical Therapy    Goal  perform sit to stand from w/c with CGA    Barrier  decreased strength and balance    Intervention  transfer training, therapeutic exercise, therapeutic activity    Goal written by:   Avani Cuadra, PT     2. Discipline  Occupational Therapy    Goal  Pt will decrease pacing and increase rest breaks during self-directed and therapist directed ROM to increase energy conservation and prevent muscle fatigue. Barrier  Cognitive, emotional, motivation towards recovery    Intervention  Education, theract, therex     Goal written by:  DARELL Crawford      3. Discipline  Speech Therapy    Goal  Patient will use safe swallowing strategies with all PO intake, minimal cues from SLP/wife. Barrier  Moderate dysphagia, dysarthria, impulsivity    Intervention  Oral/pharyngeal strengthening exercises, speech strategies to improve intelligibility, speech drill, training in swallowing strategies, MBS as needed. Goal written by:  Karena Hennessy, SWATI-SLP     4. Discipline  Nursing    Goal  Maintain/increase strength and function of affected or compensatory body part. Barrier  weakness; paresthesia    Intervention  Assist patient with exercise and perform ROM exercises for both the affected and unaffected sides. Teach and encourage patient to use his unaffected side to exercise his affected side      Goal written by:  Afsaneh Rose RN     5.   Discipline  Clinical Psychology    Goal  Continue stroke education and maximize self concept and self esteem    Barrier  Limited insight about stroke occurrence and recovery    Intervention  Patient/stroke education and support     Goal written by:  Dejan Reyez, PhD     6. Discipline  Nutrition / Dietetics    Goal  Po intake of meals will meet >75% of patient estimated nutritional needs within the next  days. Outcome:  [x] Met/Ongoing    [] Progressing towards goal    [] Not progressing towards goal    [] New/Initial goal     Barrier  none known     Intervention  continue po diet    Goal written by:  Faby Lynn RD       Disposition / Discharge Planning: Follow-up services:  [x] Physical Therapy             [x] Occupational Therapy       [x] Speech Therapy           [] Skilled Nursing      [] Medical Social Worker   [] Aide        [] Outpatient     [] Home Health   [] [address]   [x] [facility] recommendations:     Estimated discharge date:  9/6/19   Discharge Location:  [] Home   [] [facility]   [] [facility]             [] Other:          Electronic Signatures:      Signature Date Signed   Physical Therapist    Maricel Kinney, PT  8/27/19   Occupational Therapist    [place], MSOTR/L 8/26/2019   Speech Therapist    Christiano Honeycutt, [address] [facility]  8/26/19   Recreational Therapist    Armando Jayshreegregorio, [address] 8/27/2019   Nursing    Mally Duran RN 8/26/2019   Dietitian    Faby Lynn RD  8/26/19   Clinical Psychologist    Dejan Reyez, PhD 8/26/19    Physician    Nikki Castano MD   8/26/2019        Brett Farnsworth, MSW  8/26/19         The above information has been reviewed with the patient in a language that they can understand. Opportunity for comments and questions has been provided and a signed attestation has been scanned into the \"media tab\" of the EMR.       Patient Signature: ______________________________________________________    Date Signed: __________________________________________________________

## 2019-08-26 NOTE — ROUTINE PROCESS
SHIFT CHANGE NOTE FOR [facility]    Bedside and Verbal shift change report given to Michele Rea RN (oncoming nurse) by Alysha Barone RN   (offgoing nurse). Report included the following information SBAR, Kardex, MAR and Recent Results. Situation:   Code Status: Full Code   Reason for Admission: CVA  Hospital Day: 11   Problem List:   Hospital Problems  Date Reviewed: 8/26/2019          Codes Class Noted POA    Primary osteoarthritis of right ankle (Chronic) ICD-10-CM: M19.071  ICD-9-CM: 715.17  8/22/2019 Yes    Overview Signed 8/23/2019 11:54 AM by Jeanne Ricci MD     X-ray of the right ankle (8/22/2019) showed no acute fracture or subluxation; advanced degenerative changes at the tibiotalar joint; old fracture fragment vs. accessory ossicle in the inferior aspect of the lateral malleolus; soft tissue swelling around the ankle.              Vitamin D deficiency (Chronic) ICD-10-CM: E55.9  ICD-9-CM: 268.9  8/19/2019 Yes    Overview Signed 8/19/2019  1:34 PM by Jeanne Ricci MD     Vitamin D 25-Hydroxy (8/19/2019) = 16.2              Leukocytosis ICD-10-CM: C75.960  ICD-9-CM: 288.60  8/16/2019 Yes    Overview Signed 8/16/2019 11:19 AM by Jeanne Ricci MD     WBC count (8/16/2019) = 17.9             Hypertensive heart and kidney disease without heart failure and with stage 2 chronic kidney disease (Chronic) ICD-10-CM: I13.10, N18.2  ICD-9-CM: 404.90, 585.2  Unknown Yes        Chronic hypokalemia ICD-10-CM: E87.6  ICD-9-CM: 276.8  Unknown Yes    Overview Addendum 8/15/2019 11:31 AM by Jeanne Ricci MD     Persistent chronic hypokalemia + hypertension; ?primary hyperaldosteronism             Type 2 diabetes mellitus with stage 2 chronic kidney disease ([facility] [address] (Chronic) ICD-10-CM: E11.22, N18.2  ICD-9-CM: 250.40, 585.2  Unknown Yes    Overview Signed 8/15/2019 12:26 PM by Jeanne Ricci MD     HbA1c (8/13/2019) = 6.6             Pure hypercholesterolemia (Chronic) ICD-10-CM: E78.00  ICD-9-CM: 272.0  8/15/2019 Yes    Overview Addendum 8/15/2019  6:27 PM by Zeeshan Mcbride MD     Lipid profile (8/13/2019) showed , , HDL 42, ; Lipid profile (8/14/2019) showed , , HDL 39, LDL 94             Current use of aspirin ICD-10-CM: Z79.82  ICD-9-CM: V58.66  8/14/2019 Yes        On statin therapy due to risk of future cardiovascular event ICD-10-CM: Z79.899  ICD-9-CM: V58.69  8/14/2019 Yes    Overview Signed 8/15/2019 12:21 PM by Zeeshan Mcbride MD     On Atorvastatin             Vitamin B12 deficiency anemia (Chronic) ICD-10-CM: D51.9  ICD-9-CM: 281.1  8/14/2019 Yes    Overview Signed 8/19/2019  1:24 PM by Zeeshan Mcbride MD     Vitamin B12 (8/14/2019) = 208             Iron deficiency anemia (Chronic) ICD-10-CM: D50.9  ICD-9-CM: 280.9  8/14/2019 Yes    Overview Signed 8/19/2019  1:25 PM by Zeeshan Mcbride MD     Anemia work-up (8/14/2019) showed serum iron 22, TIBC 371, iron % saturation 6, ferritin 34, reticulocyte count 1.4               Refusal of statin medication by patient ICD-10-CM: Z53.29  ICD-9-CM: V64.2  8/13/2019 Yes    Overview Signed 8/15/2019  2:01 PM by Zeeshan Mcbride MD     As per note of Neurology (Dr. Daiana Galo), patient refuses statin agent because of potential side effects.               * (Principal) Acute ischemic stroke [facility]) ICD-10-CM: I63.9  ICD-9-CM: 434.91  8/12/2019 Yes    Overview Signed 8/15/2019 12:17 AM by Zeeshan Mcbride MD     Acute Ischemic Stroke (acute/early subacute infarct at the left posterior basal ganglia to corona radiata) with residual right hemiparesis, dysphagia and dysarthria             Impaired mobility and ADLs ICD-10-CM: Z74.09  ICD-9-CM: 799.89  8/12/2019 Yes        Received intravenous tissue plasminogen activator (tPA) in emergency department ICD-10-CM: Z92.82  ICD-9-CM: V45.88  8/12/2019 Yes        Right hemiparesis ([facility] [address] ICD-10-CM: G81.91  ICD-9-CM: 342.90  8/12/2019 Yes        Dysphagia ICD-10-CM: R13.10  ICD-9-CM: 787.20  8/12/2019 Yes        Dysarthria ICD-10-CM: R47.1  ICD-9-CM: 784.51  8/12/2019 Yes              Background:   Past Medical History:   Past Medical History:   Diagnosis Date    Acute ischemic stroke ([facility] [address] 8/12/2019    Acute Ischemic Stroke (acute/early subacute infarct at the left posterior basal ganglia to corona radiata) with residual right hemiparesis, dysphagia and dysarthria    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     On Allopurinol    Chronic hypokalemia     Persistent chronic hypokalemia + hypertension; ?primary hyperaldosteronism    CKD (chronic kidney disease) stage 2, GFR 60-89 ml/min 8/15/2019    Current use of aspirin 8/14/2019    Dysarthria 8/12/2019    Dysphagia 8/12/2019    Elevated prostate specific antigen (PSA) 7/21/2011    First degree atrioventricular block by electrocardiogram 8/12/2019    Hypertensive heart and kidney disease without heart failure and with stage 2 chronic kidney disease     Iron deficiency anemia 8/14/2019    Anemia work-up (8/14/2019) showed serum iron 22, TIBC 371, iron % saturation 6, ferritin 34, reticulocyte count 1.4     Obesity, Class I, BMI 30-34.9     Obstructive sleep apnea on CPAP     On statin therapy due to risk of future cardiovascular event 8/14/2019    On Atorvastatin    Primary osteoarthritis of right ankle 8/22/2019    X-ray of the right ankle (8/22/2019) showed no acute fracture or subluxation; advanced degenerative changes at the tibiotalar joint; old fracture fragment vs. accessory ossicle in the inferior aspect of the lateral malleolus; soft tissue swelling around the ankle.     Pure hypercholesterolemia 08/15/2019    Lipid profile (8/13/2019) showed , , HDL 42, ; Lipid profile (8/14/2019) showed , , HDL 39, LDL 94    Received intravenous tissue plasminogen activator (tPA) in emergency department 8/12/2019    Refusal of statin medication by patient 8/13/2019    As per note of Neurology (Dr. Lonnie Reed), patient refuses statin agent because of potential side effects.  Right hemiparesis ([address] 8/12/2019    Type 2 diabetes mellitus with stage 2 chronic kidney disease (HCC)     HbA1c (8/13/2019) = 6.6    Vitamin B12 deficiency anemia 8/14/2019    Vitamin B12 (8/14/2019) = 208    Vitamin D deficiency 8/19/2019    Vitamin D 25-Hydroxy (8/19/2019) = 16.2       Patient taking anticoagulants yes Heparin, ASA   Patient has a defibrillator: no     Assessment:   Changes in Assessment throughout shift: No     Patient has central line: no Reasons if yes: Insertion date: Last dressing date:   Patient has Boykin Cath: no Reasons if yes:     Insertion date:     Last Vitals:     Vitals:    08/25/19 2100 08/26/19 0616 08/26/19 0724 08/26/19 1430   BP: 180/74  132/62 140/64   Pulse: 75 62 60 61   Resp: 18  14 18   Temp: 97.4 °F (36.3 °C)  98 °F (36.7 °C) 98.4 °F (36.9 °C)   SpO2: 97%  97% 98%   Weight:       Height:            PAIN    Pain Assessment    Pain Intensity 1: 0 (08/26/19 1651) Pain Intensity 1: 2 (12/29/14 1105)    Pain Location 1: Shoulder Pain Location 1: Abdomen    Pain Intervention(s) 1: Medication (see MAR) Pain Intervention(s) 1: Medication (see MAR)  Patient Stated Pain Goal: 0 Patient Stated Pain Goal: 0  o Intervention effective: no    o Other actions taken for pain:      Skin Assessment  Skin color Skin Color: Appropriate for ethnicity  Condition/Temperature Skin Condition/Temp: Dry, Warm  Integrity Skin Integrity: Intact, Tear  Turgor Turgor: Epidermis thin w/ loss of subcut tissue  Weekly Pressure Ulcer Documentation  Pressure  Injury Documentation: No Pressure Injury Noted-Pressure Ulcer Prevention Initiated  Wound Prevention & Protection Methods  Orientation of wound Orientation of Wound Prevention: Posterior  Location of Prevention Location of Wound Prevention: Sacrum/Coccyx  Dressing Present Dressing Present : Intact, not due to be changed  Dressing Status Dressing Status: Intact  Wound Offloading Wound Offloading (Prevention Methods): Bed, pressure reduction mattress     INTAKE/OUPUT  Date 08/25/19 1900 - 08/26/19 0659 08/26/19 0700 - 08/27/19 0659   Shift 4407-0853 24 Hour Total 2712-6637 2077-9204 24 Hour Total   INTAKE   P.O.   240  240     P. O.   240  240   Shift Total(mL/kg)   240(2.4)  240(2.4)   OUTPUT   Urine(mL/kg/hr)          Urine Occurrence(s) 4 x 8 x 4 x  4 x   Emesis/NG output          Emesis Occurrence(s) 0 x 0 x      Stool          Stool Occurrence(s) 0 x 0 x 2 x  2 x   Shift Total(mL/kg)        NET   240  240   Weight (kg) 102 102 102 102 102       Recommendations:  1. Patient needs and requests:     2. Diet: Cardiac Mech Soft Nectar Thick     3. Pending tests/procedures:      4. Functional Level/Equipment:     5. Estimated Discharge Date: 8/24/19 Posted on Whiteboard in Patients Room: yes     HEALS Safety Check    A safety check occurred in the patient's room between off going nurse and oncoming nurse listed above. The safety check included the below items  Area Items   H  High Alert Medications - Verify all high alert medication drips (heparin, PCA, etc.)   E  Equipment - Suction is set up for ALL patients (with diann)  - Red plugs utilized for all equipment (IV pumps, etc.)  - WOWs wiped down at end of shift.  - Room stocked with oxygen, suction, and other unit-specific supplies   A  Alarms - Bed alarm is set for fall risk patients  - Ensure chair alarm is in place and activated if patient is up in a chair   L  Lines - Check IV for any infiltration  - Boykin bag is empty if patient has a Boykin   - Tubing and IV bags are labeled   S  Safety   - Room is clean, patient is clean, and equipment is clean. - Hallways are clear from equipment besides carts. - Fall bracelet on for fall risk patients  - Ensure room is clear and free of clutter  - Suction is set up for ALL patients (with diann)  - Hallways are clear from equipment besides carts.    - Isolation precautions followed, supplies available outside room, sign posted

## 2019-08-27 ENCOUNTER — APPOINTMENT (OUTPATIENT)
Dept: GENERAL RADIOLOGY | Age: 64
DRG: 057 | End: 2019-08-27
Attending: INTERNAL MEDICINE
Payer: MEDICARE

## 2019-08-27 LAB
CREAT UR-MCNC: 31 MG/DL (ref 30–125)
GLUCOSE BLD STRIP.AUTO-MCNC: 108 MG/DL (ref 70–110)
GLUCOSE BLD STRIP.AUTO-MCNC: 113 MG/DL (ref 70–110)
GLUCOSE BLD STRIP.AUTO-MCNC: 113 MG/DL (ref 70–110)
GLUCOSE BLD STRIP.AUTO-MCNC: 124 MG/DL (ref 70–110)
MICROALBUMIN UR-MCNC: 56 MG/DL (ref 0–3)
MICROALBUMIN/CREAT UR-RTO: 1806 MG/G (ref 0–30)

## 2019-08-27 PROCEDURE — 74011250636 HC RX REV CODE- 250/636: Performed by: INTERNAL MEDICINE

## 2019-08-27 PROCEDURE — 65310000000 HC RM PRIVATE REHAB

## 2019-08-27 PROCEDURE — 97112 NEUROMUSCULAR REEDUCATION: CPT

## 2019-08-27 PROCEDURE — 74011250637 HC RX REV CODE- 250/637: Performed by: INTERNAL MEDICINE

## 2019-08-27 PROCEDURE — 97530 THERAPEUTIC ACTIVITIES: CPT

## 2019-08-27 PROCEDURE — 97110 THERAPEUTIC EXERCISES: CPT

## 2019-08-27 PROCEDURE — BD11ZZZ FLUOROSCOPY OF ESOPHAGUS: ICD-10-PCS | Performed by: INTERNAL MEDICINE

## 2019-08-27 PROCEDURE — 74230 X-RAY XM SWLNG FUNCJ C+: CPT

## 2019-08-27 PROCEDURE — 92507 TX SP LANG VOICE COMM INDIV: CPT

## 2019-08-27 PROCEDURE — 92611 MOTION FLUOROSCOPY/SWALLOW: CPT

## 2019-08-27 PROCEDURE — 97535 SELF CARE MNGMENT TRAINING: CPT

## 2019-08-27 PROCEDURE — 77030037878 HC DRSG MEPILEX >48IN BORD MOLN -B

## 2019-08-27 PROCEDURE — 92526 ORAL FUNCTION THERAPY: CPT

## 2019-08-27 PROCEDURE — 82962 GLUCOSE BLOOD TEST: CPT

## 2019-08-27 PROCEDURE — 74011000258 HC RX REV CODE- 258: Performed by: INTERNAL MEDICINE

## 2019-08-27 PROCEDURE — 97116 GAIT TRAINING THERAPY: CPT

## 2019-08-27 PROCEDURE — 74011000255 HC RX REV CODE- 255: Performed by: INTERNAL MEDICINE

## 2019-08-27 RX ORDER — HYDRALAZINE HYDROCHLORIDE 50 MG/1
100 TABLET, FILM COATED ORAL EVERY 8 HOURS
Status: DISCONTINUED | OUTPATIENT
Start: 2019-08-27 | End: 2019-08-28

## 2019-08-27 RX ADMIN — AMLODIPINE BESYLATE 10 MG: 10 TABLET ORAL at 08:34

## 2019-08-27 RX ADMIN — IRON SUCROSE 50 MG: 20 INJECTION, SOLUTION INTRAVENOUS at 19:10

## 2019-08-27 RX ADMIN — ISOSORBIDE DINITRATE 30 MG: 20 TABLET ORAL at 23:28

## 2019-08-27 RX ADMIN — BACLOFEN 10 MG: 10 TABLET ORAL at 13:02

## 2019-08-27 RX ADMIN — CYANOCOBALAMIN TAB 1000 MCG 1000 MCG: 1000 TAB at 08:34

## 2019-08-27 RX ADMIN — HYDRALAZINE HYDROCHLORIDE 100 MG: 50 TABLET ORAL at 15:28

## 2019-08-27 RX ADMIN — SPIRONOLACTONE 50 MG: 25 TABLET ORAL at 23:28

## 2019-08-27 RX ADMIN — ISOSORBIDE DINITRATE 30 MG: 20 TABLET ORAL at 06:01

## 2019-08-27 RX ADMIN — LABETALOL HCL 600 MG: 200 TABLET, FILM COATED ORAL at 23:27

## 2019-08-27 RX ADMIN — Medication 5000 UNITS: at 08:34

## 2019-08-27 RX ADMIN — BARIUM SULFATE 700 MG: 700 TABLET ORAL at 15:03

## 2019-08-27 RX ADMIN — ASPIRIN 81 MG 81 MG: 81 TABLET ORAL at 08:34

## 2019-08-27 RX ADMIN — Medication 400 MG: at 08:33

## 2019-08-27 RX ADMIN — FERROUS SULFATE TAB 325 MG (65 MG ELEMENTAL FE) 325 MG: 325 (65 FE) TAB at 08:34

## 2019-08-27 RX ADMIN — ATORVASTATIN CALCIUM 80 MG: 40 TABLET, FILM COATED ORAL at 23:28

## 2019-08-27 RX ADMIN — HYDRALAZINE HYDROCHLORIDE 100 MG: 50 TABLET ORAL at 23:26

## 2019-08-27 RX ADMIN — BARIUM SULFATE 15 G: 960 POWDER, FOR SUSPENSION ORAL at 15:03

## 2019-08-27 RX ADMIN — Medication 250 MG: at 08:34

## 2019-08-27 RX ADMIN — BACLOFEN 10 MG: 10 TABLET ORAL at 17:19

## 2019-08-27 RX ADMIN — HEPARIN SODIUM 5000 UNITS: 5000 INJECTION INTRAVENOUS; SUBCUTANEOUS at 22:00

## 2019-08-27 RX ADMIN — BACLOFEN 10 MG: 10 TABLET ORAL at 06:01

## 2019-08-27 RX ADMIN — BARIUM SULFATE 5 G: 400 SUSPENSION ORAL at 15:04

## 2019-08-27 RX ADMIN — ISOSORBIDE DINITRATE 30 MG: 20 TABLET ORAL at 15:20

## 2019-08-27 RX ADMIN — BARIUM SULFATE 5 G: 400 PASTE ORAL at 15:04

## 2019-08-27 RX ADMIN — HEPARIN SODIUM 5000 UNITS: 5000 INJECTION INTRAVENOUS; SUBCUTANEOUS at 15:19

## 2019-08-27 RX ADMIN — HEPARIN SODIUM 5000 UNITS: 5000 INJECTION INTRAVENOUS; SUBCUTANEOUS at 06:02

## 2019-08-27 RX ADMIN — BARIUM SULFATE 15 ML: 400 SUSPENSION ORAL at 15:04

## 2019-08-27 RX ADMIN — SPIRONOLACTONE 50 MG: 25 TABLET ORAL at 08:34

## 2019-08-27 RX ADMIN — HYDRALAZINE HYDROCHLORIDE 75 MG: 50 TABLET, FILM COATED ORAL at 06:01

## 2019-08-27 RX ADMIN — LABETALOL HCL 600 MG: 200 TABLET, FILM COATED ORAL at 08:34

## 2019-08-27 NOTE — PROGRESS NOTES
Problem: Dysphagia (Adult)  Goal: *Speech Goal: (INSERT TEXT)  Description  Long term goals  Patient will:  1. Tolerate regular diet with thin liquids using safe swallowing techniques without s/s of aspiration in 5/6 meals. 2. Use safe swallowing techniques (including slowed rate, small bites/sips, alternate liquids/solids, effortful swallow, head rotation to right for liquids) with supervision. 3. Participate in MBS study prior to advancing to thin liquids to assure safety (no aspiration). 4. Perform oral and pharyngeal strengthening exercises (to improve speech and swallowing) with supervision-modified independence. 5. Demonstrate 90% intelligibility in conversation using appropriate speaking strategies (slowed rate, overarticulation, increased volume). 6. Recall 3 words after 5 minutes with supervision. 7. Perform functional problem solving/reasoning tasks with 90% accuracy. 8. Name 8-10 items in categories of increasing abstraction, supervision. Short term goals (by 8/30/19)  Patient will:   1. Tolerate soft diet with nectar thick liquids using safe swallowing techniques without s/s of aspiration in 5/6 meals. 2. Use safe swallowing techniques (including slowed rate, small bites/sips, alternate liquids/solids, effortful swallow, head rotation to right for liquids) with min cues. 3. Tolerate small amounts of ice chips and water with the SLP using head rotation to the right and/or chin tuck, without overt s/s of aspiration. 4. Perform oral and pharyngeal strengthening exercises (to improve speech and swallowing) with min assist.  5. Demonstrate 90% intelligibility in polysyllabic words and sentences using appropriate speaking strategies (slowed rate, overarticulation, increased volume). 6. Recall 3 words after 5 minutes with mod assist.  7. Perform functional problem solving/reasoning tasks with 75-85% accuracy. 8. Name 8-10 items in concrete categories, supervision.       8/27/2019 1727 by Nay Ziegler Stephanie Najera, SLP  Note:   SPEECH PATHOLOGY MODIFIED BARIUM SWALLOW STUDY    Patient: Sagar Ryder (4 y.o. male)  Date: 8/27/2019  Primary Diagnosis: Acute ischemic stroke [facility]) [I63.9]        Precautions:  Aspiration      SUBJECTIVE:   Patient stated How did I do on the test?.    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     On Allopurinol    Chronic hypokalemia     Persistent chronic hypokalemia + hypertension; ?primary hyperaldosteronism    CKD (chronic kidney disease) stage 2, GFR 60-89 ml/min 8/15/2019    Current use of aspirin 8/14/2019    Dysarthria 8/12/2019    Dysphagia 8/12/2019    Elevated prostate specific antigen (PSA) 7/21/2011    First degree atrioventricular block by electrocardiogram 8/12/2019    Hypertensive heart and kidney disease without heart failure and with stage 2 chronic kidney disease     Iron deficiency anemia 8/14/2019    Anemia work-up (8/14/2019) showed serum iron 22, TIBC 371, iron % saturation 6, ferritin 34, reticulocyte count 1.4     Obesity, Class I, BMI 30-34.9     Obstructive sleep apnea on CPAP     On statin therapy due to risk of future cardiovascular event 8/14/2019    On Atorvastatin    Primary osteoarthritis of right ankle 8/22/2019    X-ray of the right ankle (8/22/2019) showed no acute fracture or subluxation; advanced degenerative changes at the tibiotalar joint; old fracture fragment vs. accessory ossicle in the inferior aspect of the lateral malleolus; soft tissue swelling around the ankle.     Pure hypercholesterolemia 08/15/2019    Lipid profile (8/13/2019) showed , , HDL 42, ; Lipid profile (8/14/2019) showed , , HDL 39, LDL 94    Received intravenous tissue plasminogen activator (tPA) in emergency department 8/12/2019    Refusal of statin medication by patient 8/13/2019    As per note of Neurology (Dr. Anna Quan), patient refuses statin agent because of potential side effects. Right hemiparesis ([address] 8/12/2019    Type 2 diabetes mellitus with stage 2 chronic kidney disease (HCC)     HbA1c (8/13/2019) = 6.6    Vitamin B12 deficiency anemia 8/14/2019    Vitamin B12 (8/14/2019) = 208    Vitamin D deficiency 8/19/2019    Vitamin D 25-Hydroxy (8/19/2019) = 16.2      Past Surgical History:   Procedure Laterality Date    COLONOSCOPY N/A 4/15/2019    COLONOSCOPY performed by Amos Taylor MD at [facility] ENDOSCOPY    HX TONSILLECTOMY       Prior Level of Function/Home Situation: Independent  Home Situation  Home Environment: Private residence  # Steps to Enter: 2  Rails to Enter: No  One/Two Story Residence: One story  Living Alone: No(Living with spouse)  Support Systems: Friends \ neighbors, Spouse/Significant Other/Partner  Patient Expects to be Discharged to[de-identified] Private residence  Current DME Used/Available at Home: Sandra Sinning, rollator  Tub or Shower Type: Shower  Diet prior to admission: Regular at home  Current Diet:  Advanced soft, nectar thick liquids   Radiologist: Fluoroscopist  Film Views: Lateral  Patient Position: Seated 90* in chair    Trial 1: Trial 2:   Consistency Presented:  Thin liquid Consistency Presented: Nectar thick liquid;Pill/Tablet   How Presented: Self-fed/presented;SLP-fed/presented;Cup/sip;Spoon How Presented: Self-fed/presented;SLP-fed/presented;Cup/sip;Spoon   Consistency Amount: 20 Consistency Amount: 15   Bolus Acceptance: No impairment Bolus Acceptance: No impairment   Bolus Formation/Control: Impaired: Premature spillage Bolus Formation/Control: Impaired: Premature spillage   Propulsion: Delayed (# of seconds) Propulsion: Delayed (# of seconds)   Oral Residue: None Oral Residue: None   Initiation of Swallow: Triggered at vallecula;Triggered at pyriform sinus(es) Initiation of Swallow: Triggered at valleculae;Triggered at pyriform sinus(es)   Timing: Pooling 1-5 sec Timing: Pooling 1-5 sec   Penetration: To cords Penetration: None   Aspiration/Timing: Silent ; After;From initial swallow Aspiration/Timing: No evidence of aspiration   Pharyngeal Clearance: Vallecular residue; Less than 10% Pharyngeal Clearance: No residue   Attempted Modifications: Effortful swallow;Right head turn;Small sips and bites Attempted Modifications: Effortful swallow   Effective Modifications: None Effective Modifications: (Effortful swallow)   Cues for Modifications: Minimal Cues for Modifications: Minimal           Trial 3: Trial 4:   Consistency Presented: Honey thick liquid Consistency Presented: Pudding   How Presented: SLP-fed/presented;Spoon How Presented: SLP-fed/presented;Spoon   Consistency Amount: 5 Consistency Amount: 10   Bolus Acceptance: No impairment Bolus Acceptance: No impairment   Bolus Formation/Control: Impaired: Premature spillage Bolus Formation/Control: No impairment, issues, or problems ; Impaired: Delayed;Premature spillage   Propulsion: Delayed (# of seconds) Propulsion: Delayed (# of seconds)   Oral Residue: None Oral Residue: None   Initiation of Swallow: Triggered at valleculae;Triggered at pyriform sinus(es)    Timing: Pooling 1-5 sec Timing: Pooling 1-5 sec   Penetration: None Penetration: None   Aspiration/Timing: No evidence of aspiration Aspiration/Timing: No evidence of aspiration   Pharyngeal Clearance: Pyriform residue ; Less than 10% Pharyngeal Clearance: No residue   Attempted Modifications: Effortful swallow Attempted Modifications: Effortful swallow   Effective Modifications: (Effortful swallow) Effective Modifications: (Effortful swallow)   Cues for Modifications: Minimal Cues for Modifications: Minimal           Decreased Tongue Base Retraction?: Yes  Laryngeal Elevation: Incomplete laryngeal closure  Aspiration/Penetration Score: 8 (Aspiration-Contrast passes cords/glottis with no effort to eject, ie/silent aspiration)(With thin consistency only)  Pharyngeal Symmetry: Not assessed  Pharyngeal-Esophageal Segment: No impairment  Pharyngeal Dysfunction: Decreased tongue base retraction;Decreased elevation/closure  Oral Phase Severity: Mild  Pharyngeal Phase Severity: Moderate    ASSESSMENT:   Based on the objective data described below, the patient presents with Mild oral and moderate pharyngeal phase dysphagia. Patient will benefit from skilled intervention to address the above impairments. Patients rehabilitation potential is considered to be Good  Factors which may influence rehabilitation potential include:   ? None noted  ? Mental ability/status (impulsivity)  ? Medical condition  ? Home/family situation and support systems  ? Safety awareness  ? Pain tolerance/management  ? Other:     PLAN:   Recommendations and Planned Interventions:  SLP will continue daily intervention for dysphagia including monitoring at mealtime, training in safe swallowing techniques, diet modifications as warranted, family training, oral/pharyneal strengthening exercises. Frequency/Duration: Patient will be followed by speech-language pathology 1-2 times per day/4-7 days per week to address goals. Discharge Recommendations: Home Health    COMMUNICATION/EDUCATION:   ?  Posted safety precautions in patient's room. ? Patient/family have participated as able in goal setting and plan of care. ?  Patient/family agree to work toward stated goals and plan of care. ?  Patient understands intent and goals of therapy, but is neutral about his/her participation. ? Patient is unable to participate in goal setting and plan of care.     Thank you for this referral.  Abi Foster SLP  Time Calculation: 30 mins   8/27/2019 1700 by SILVERIO Souza  Note:   SPEECH LANGUAGE PATHOLOGY TREATMENT    Patient: Sumeet Poe (66 y.o. male)  Date: 8/27/2019  Diagnosis: Acute ischemic stroke [facility]) [I63.9] Acute ischemic stroke ([facility] [address]       Time in: 0835 1040 1230 1430  Time Out:  0900 1110 1315 1500  SUBJECTIVE:   Patient stated I've been practicing in my room. OBJECTIVE:   Mental Status:  Mr. Kandice Rao was awake and alert. He continues to be quite impulsive. Treatment & Interventions:   Patient was seen in the dining room today for breakfast and lunch. He continues to need at least moderate cues for use of safe swallowing techniques. Patient ate his breakfast very quickly (12-15 minutes) then finished the liquids on the tray. Mr. Kandice Rao needs consistent cues for use of safe swallowing techniques, but does not like to receive them. At lunchtime patient had both throat clearing and cough while eating due to boluses which were too large, despite cuing from both the SLP and his wife. In a thirty minute speech therapy session this morning, the following treatment tasks were presented: Motor Speech:   Oral/laryngeal exercises: Review of selected ex, min cues  \"ng\" in words:   Produced strong /g/ with cues from the SLP  Review of swallow strats: Mod assist to verbalize  Small boluses ice chips: Throat clearing after 1 of 5  Sips of water:   Patient with throat clearing or cough (x 1) in 5/15 trials    Neuro-Linguistics:   Orientation:   Supervision  Recent memory:  Supervision  Drawing conclusions:  80% appropriate responses    Response & Tolerance to Activities:  Mr. Kandice Rao continues to be impulsive with PO intake, resulting in throat clearing or cough after larger boluses or when not utilizing techniques presented by the SLP. Pain:  Pain Scale 1: Numeric (0 - 10)  No report of pain     After treatment:   ?       Patient left in no apparent distress sitting up in chair  ? Patient left in no apparent distress in bed  ? Call bell left within reach  ?        Nursing notified  ? Caregiver present  ? Bed alarm activated    ASSESSMENT:   Progression toward goals:  ?       Improving appropriately and progressing toward goals  ? Improving slowly and progressing toward goals  ? Not making progress toward goals and plan of care will be adjusted    PLAN:   Patient continues to benefit from skilled intervention to address the above impairments. Continue treatment per established plan of care.   Discharge Recommendations:  [facility]    Estimated LOS: Through 9/6/19    Nallely Porter SLP  Time Calculation:  100 Minutes

## 2019-08-27 NOTE — PROGRESS NOTES
Problem: Self Care Deficits Care Plan (Adult)  Goal: *Therapy Goal (Edit Goal, Insert Text)  Description  Occupational Therapy Goals   Long Term Goals  Initiated 2019 and to be accomplished within 4 week(s) (19)  1. Pt will perform self-feeding with MI.  2. Pt will perform grooming with Set-up. 3. Pt will perform UB bathing with SBA. 4. Pt will perform LB bathing with Luba/CGA. 5. Pt will perform tub/shower transfer with Luba/CGA using LRAD. 6. Pt will perform UB dressing with Set-up. 7. Pt will perform LB dressing with Luba/CGA. 8. Pt will perform toileting task with Luba/CGA. 9. Pt will perform toilet transfer with Luba/CGA using LRAD. Short Term Goals   Initiated 2019 and to be accomplished within 7 day(s) (Updated , to be met by 19)  1. Pt will perform self-feeding with S; using compensatory strategies for right hand incorporation into set-up. (GM)  2. Pt will perform simple grooming task, including denture mgmt, with Luba and compensatory strategies as needed. (GM; washing face- pt reporting spouse previously assisted with denture mgmt)  3. Pt will perform UB bathing with Luba using AE as needed. (GM)  4. Pt will perform LB bathing with ModA using AE and compensatory strategies as needed. (GM)  5. Pt will perform tub/shower transfer with modA x 1 <>TTB. (GM)  6. Pt will perform UB dressing with CGA. (Cont for consistency)  7. Pt will perform LB dressing with ModA using AE as needed. (GM)  8. Pt will perform toileting task with ModA. (GM)  9. Pt will perform toilet transfer with ModA x 1 <>3:1 commode. (GM)      Outcome: Progressing Towards Goal   OCCUPATIONAL THERAPY TREATMENT    Patient: Marisol Painting   71 y.o.     Patient identified with name and : Yes     Date: 2019    First Tx Session  Time In: 0930  Time Out[de-identified] 56    Second Tx Session  Time In: 1105  Time Out[de-identified] 9030    Diagnosis: Acute ischemic stroke ([facility] [address] [I63.9]   Precautions: Aspiration, NWB  Chart, occupational therapy assessment, plan of care, and goals were reviewed. Pain:  Pt reports 0/10 pain or discomfort prior to treatment. Pt reports 0/10 pain or discomfort post treatment. Intervention Provided: N/A      SUBJECTIVE:   Patient stated I am ready to go home.     OBJECTIVE DATA SUMMARY:     Pt sitting up in w/c in gym for AM session. He participated in w/c > mat (and end of session mat > w/c with Min A STS from mat) stand-step transfer requiring Mod A for STS from w/c and Min A for stand-step with initial cues for pacing as pt tends to make quick movements without cueing. Good participation in ICONIC this session with min cues for proper form of HOHA. End of session, pt reporting he needed to void, however with extra time and assist for urinal pt unable to void. Second session, pt received from SLP reporting he no longer needs to use the restroom when offered. Pt educated on staying hydrated with ginger ale retrieved from room. Stand step transfer w/c > mat Min A with mod cues for proper step placement and occ assistance for right foot placement. Pt again with good participation in 1 [employer] tasks. He remained seated on mat for rest break prior to PT session. Retrieved fresh [place] thick water with fruit punch crystal light for self-hydration for pt. NMRE Daily Assessment   NMRE of right UE to increase neuroplasticity/ROM/re-ed/muscle stimulation in prep for functional use/ADLs  First session, seated EOM pt participated in NMRE of right UE. HOHA (hands clasped) using x8 cones x4 trials to grasp//place cones from midline > left > midline > right side. Pt required Min A for weight of UE to complete task. Vibration applied to right UE to facilitate muscle stimulation. Pt tolerated vibration to tricep while completing SROM gravity eliminated elbow flexion/extension 10x2 reps, SROM elbow flexion/extension against gravity 10x2 reps. Pt then tolerated vibration to right hand/palm for NMRE.  Pt instructed to visualize ROM of right UE during task and repeated \"right elbow in, elbow out\" etc during task. Second session, pt participated in [address] via weight bearing through right UE. Wedges (stacked on top of each other to form square) placed under right UE to facilitate proper alignment while Min A for stability of UE to WB through right shoulder/elbow while pt performed functional reach with left UE for x4 cones x5 trials. Pt completed anterior weight shift to reach for cones and place to left and crossing midline to place on right side. Pt then participated in [place] through right UE (with burgundy wedge only) to complete trunk rotation/weight shift while attempting to push up from wedge. Pt given Min A for stability of right UE (at shoulder/elbow) and to facilitate proper form. He completed 10x3 reps anterior weight shift > push up with right UE > left lateral lean > right lateral lean onto right UE.      TOILETING Daily Assessment    Toileting  Toileting Assistance (FIM Score): 5 (Supervision)(SBA attempts to use urinal without success)     MOBILITY/TRANSFERS Daily Assessment   Functional transfers w/c <> mat in prep for ADLs/session  Stand-step transfer with gait belt donned, Min A without AD and occ assist for right LE placement. Mod cues for pacing prior to and during transfer as pt is quick with movements making them unsafe at times. ASSESSMENT:  Pt continues to remain motivated towards recovery with right UE with returning home reported as his motivation. He is progressing with functional transfers, however continues to require increased cues for pacing. Progression toward goals:  ?          Improving appropriately and progressing toward goals  ? Improving slowly and progressing toward goals  ? Not making progress toward goals and plan of care will be adjusted     PLAN:  Patient continues to benefit from skilled intervention to address the above impairments.   Continue treatment per established plan of care. Discharge Recommendations:  [facility]  Further Equipment Recommendations for Discharge:  Bariatric 3:1 commode, TTB       Activity Tolerance:  Fair; pt required min rest breaks     Estimated LOS: 9/6/19    Please refer to the flowsheet for vital signs taken during this treatment. After treatment:   ?  Patient left in no apparent distress sitting EOM, handoff to PT  ? Patient left in no apparent distress in bed  ? Call bell left within reach  ? Nursing notified  ? Caregiver present  ? Bed alarm activated    COMMUNICATION/EDUCATION:   ? Home safety education was provided and the patient/caregiver indicated understanding. ? Patient/family have participated as able in goal setting and plan of care. ? Patient/family agree to work toward stated goals and plan of care. ? Patient understands intent and goals of therapy, but is neutral about his/her participation. ? Patient is unable to participate in goal setting and plan of care.       JUSTIN Mena/ROVERTO

## 2019-08-27 NOTE — PROGRESS NOTES
Patient Name: Yvonne Walton  Patient Age: 71 y.o. 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     On Allopurinol    Chronic hypokalemia     Persistent chronic hypokalemia + hypertension; ?primary hyperaldosteronism    CKD (chronic kidney disease) stage 2, GFR 60-89 ml/min 8/15/2019    Current use of aspirin 8/14/2019    Dysarthria 8/12/2019    Dysphagia 8/12/2019    Elevated prostate specific antigen (PSA) 7/21/2011    First degree atrioventricular block by electrocardiogram 8/12/2019    Hypertensive heart and kidney disease without heart failure and with stage 2 chronic kidney disease     Iron deficiency anemia 8/14/2019    Anemia work-up (8/14/2019) showed serum iron 22, TIBC 371, iron % saturation 6, ferritin 34, reticulocyte count 1.4     Obesity, Class I, BMI 30-34.9     Obstructive sleep apnea on CPAP     On statin therapy due to risk of future cardiovascular event 8/14/2019    On Atorvastatin    Primary osteoarthritis of right ankle 8/22/2019    X-ray of the right ankle (8/22/2019) showed no acute fracture or subluxation; advanced degenerative changes at the tibiotalar joint; old fracture fragment vs. accessory ossicle in the inferior aspect of the lateral malleolus; soft tissue swelling around the ankle.     Pure hypercholesterolemia 08/15/2019    Lipid profile (8/13/2019) showed , , HDL 42, ; Lipid profile (8/14/2019) showed , , HDL 39, LDL 94    Received intravenous tissue plasminogen activator (tPA) in emergency department 8/12/2019    Refusal of statin medication by patient 8/13/2019    As per note of Neurology (Dr. Casandra Reveles), patient refuses statin agent because of potential side effects.  Right hem[facility] 8/12/2019    Type 2 diabetes mellitus with stage 2 chronic kidney disease (HCC)     HbA1c (8/13/2019) = 6.6    Vitamin B12 deficiency anemia 8/14/2019    Vitamin B12 (8/14/2019) = 208    Vitamin D deficiency 8/19/2019    Vitamin D 25-Hydroxy (8/19/2019) = 16.2        Medical Diagnosis:  Acute ischemic stroke ([facility] [I63.9] Acute ischemic stroke [facility])           Recreation Therapy Initial Assessment    Living arrangements:  __ alone   _x_spouse   __relative/family   __roommate    __other    Transportation: _x_drivers license   __public transportation   _x_friends/family   __none    Cognitive status:   Oriented to:   _x_person   __x_place   ___time   __x_situation  Responsiveness:  __x_alert   ___inconsistent   ___unresponsive    Mood: Pleasant and cooperative    Decision-making abilities: _x_demonstrates initiative   __with prompting   __dependent    Attention span: x__good (+15 min.)   __fair(5-15min.)   __poor(5min. or less)  Follows directions: _x_good   ___fair   __poor prompts needed? __yes   __no    Communication:   _x_verbalizes needs   ___uses gestures   __unable to verbalize  __verbal - difficult to understand __augmentative device    Ability to understand conversation: _x_good   __fair   __poor    Vision: _x_good   __fair   __poor   __blind   __wears glasses    Hearing:  _x_good   __fair   __poor   __deaf   __hearing aid   __sign    Hears best in :___R ear   ___L ear    Premorbid mobility needs: x__Independent   __assistance   __cane   __walker   __w/c    POSSIBLE LEISURE BARRIERS:        x Cognitive Skills  Social Skills/Approp.   Communication     Paralysis  Financial x General Weakness    x ROM Limitations x Mobility x Endurance     Perceptual Problems x Grasp/Release  Fears/Phobias     Hearing Deficits  Visual Acuity  Motivation     Spasticity  Pain  Self Confidence     Attitude         Personal Goal for Admission: (get my leg and arm to work)  Past Leisure Activities: Dining out;Pets/animals; Theater/cultural arts(civil war reinactment)  Present Personal Support Systems: Spouse/significant other;Family  Mr Dione Cortez was seen in his room with his wife present. He was agreeable to continue with her present during conversation. Mr Dione Cortez shared that it was he and his wife at home. He has a daughter that lives in [place], [place] and one grandson. He is retired from [employer] and worked part-time for the [employer] after retiring. Mr Dione Cortez shared that he enjoyed traveling with his wife to watch civil war reenactments, but has taken a break from traveling since their miniature poodle had gotten older. He is encouraged by working with therapy and will benefit from continued rehabilitation.         Gagan Walker, CTRS  8/27/2019

## 2019-08-27 NOTE — PROGRESS NOTES
Problem: Dysphagia (Adult)  Goal: *Speech Goal: (INSERT TEXT)  Description  Long term goals  Patient will:  1. Tolerate regular diet with thin liquids using safe swallowing techniques without s/s of aspiration in 5/6 meals. 2. Use safe swallowing techniques (including slowed rate, small bites/sips, alternate liquids/solids, effortful swallow, head rotation to right for liquids) with supervision. 3. Participate in MBS study prior to advancing to thin liquids to assure safety (no aspiration). 4. Perform oral and pharyngeal strengthening exercises (to improve speech and swallowing) with supervision-modified independence. 5. Demonstrate 90% intelligibility in conversation using appropriate speaking strategies (slowed rate, overarticulation, increased volume). 6. Recall 3 words after 5 minutes with supervision. 7. Perform functional problem solving/reasoning tasks with 90% accuracy. 8. Name 8-10 items in categories of increasing abstraction, supervision. Short term goals (by 8/30/19)  Patient will:   1. Tolerate soft diet with nectar thick liquids using safe swallowing techniques without s/s of aspiration in 5/6 meals. 2. Use safe swallowing techniques (including slowed rate, small bites/sips, alternate liquids/solids, effortful swallow, head rotation to right for liquids) with min cues. 3. Tolerate small amounts of ice chips and water with the SLP using head rotation to the right and/or chin tuck, without overt s/s of aspiration. 4. Perform oral and pharyngeal strengthening exercises (to improve speech and swallowing) with min assist.  5. Demonstrate 90% intelligibility in polysyllabic words and sentences using appropriate speaking strategies (slowed rate, overarticulation, increased volume). 6. Recall 3 words after 5 minutes with mod assist.  7. Perform functional problem solving/reasoning tasks with 75-85% accuracy. 8. Name 8-10 items in concrete categories, supervision.       Note:   SPEECH LANGUAGE PATHOLOGY TREATMENT    Patient: Sagar Ryder (89 y.o. male)  Date: 8/27/2019  Diagnosis: Acute ischemic stroke [facility]) [I63.9] Acute ischemic stroke ([facility] [address]       Time in: 0835 1040 1230 1430  Time Out:  0900 1110 1315 1500  SUBJECTIVE:   Patient stated I've been practicing in my room. OBJECTIVE:   Mental Status:  Mr. Cindy Yap was awake and alert. He continues to be quite impulsive. Treatment & Interventions:   Patient was seen in the dining room today for breakfast and lunch. He continues to need at least moderate cues for use of safe swallowing techniques. Patient ate his breakfast very quickly (12-15 minutes) then finished the liquids on the tray. Mr. Cindy Yap needs consistent cues for use of safe swallowing techniques, but does not like to receive them. At lunchtime patient had both throat clearing and cough while eating due to boluses which were too large, despite cuing from both the SLP and his wife. In a thirty minute speech therapy session this morning, the following treatment tasks were presented: Motor Speech:   Oral/laryngeal exercises: Review of selected ex, min cues  \"ng\" in words:   Produced strong /g/ with cues from the SLP  Review of swallow strats: Mod assist to verbalize  Small boluses ice chips: Throat clearing after 1 of 5  Sips of water:   Patient with throat clearing or cough (x 1) in 5/15 trials    Neuro-Linguistics:   Orientation:   Supervision  Recent memory:  Supervision  Drawing conclusions:  80% appropriate responses    Response & Tolerance to Activities:  Mr. Cindy Yap continues to be impulsive with PO intake, resulting in throat clearing or cough after larger boluses or when not utilizing techniques presented by the SLP. Pain:  Pain Scale 1: Numeric (0 - 10)  No report of pain     After treatment:   ?       Patient left in no apparent distress sitting up in chair  ? Patient left in no apparent distress in bed  ? Call bell left within reach  ?        Nursing notified  ? Caregiver present  ? Bed alarm activated    ASSESSMENT:   Progression toward goals:  ?       Improving appropriately and progressing toward goals  ? Improving slowly and progressing toward goals  ? Not making progress toward goals and plan of care will be adjusted    PLAN:   Patient continues to benefit from skilled intervention to address the above impairments. Continue treatment per established plan of care.   Discharge Recommendations:  [facility]    Estimated LOS: Through 9/6/19    SILVERIO Hess  Time Calculation:  100 Minutes

## 2019-08-27 NOTE — PROGRESS NOTES
Problem: Diabetes Self-Management  Goal: *Disease process and treatment process  Description  Define diabetes and identify own type of diabetes; list 3 options for treating diabetes. 8/27/2019 1423 by Sharron Herman RN  Outcome: Progressing Towards Goal  8/27/2019 1015 by Sharron Herman RN  Outcome: Progressing Towards Goal  Goal: *Incorporating nutritional management into lifestyle  Description  Describe effect of type, amount and timing of food on blood glucose; list 3 methods for planning meals. 8/27/2019 1423 by Sharron Herman RN  Outcome: Progressing Towards Goal  8/27/2019 1015 by Sharron Herman RN  Outcome: Progressing Towards Goal  Goal: *Incorporating physical activity into lifestyle  Description  State effect of exercise on blood glucose levels. 8/27/2019 1423 by Sharron Herman RN  Outcome: Progressing Towards Goal  8/27/2019 1015 by Sharron Herman RN  Outcome: Progressing Towards Goal  Goal: *Developing strategies to promote health/change behavior  Description  Define the ABC's of diabetes; identify appropriate screenings, schedule and personal plan for screenings. 8/27/2019 1423 by Sharron Herman RN  Outcome: Progressing Towards Goal  8/27/2019 1015 by Sharron Herman RN  Outcome: Progressing Towards Goal  Goal: *Using medications safely  Description  State effect of diabetes medications on diabetes; name diabetes medication taking, action and side effects. 8/27/2019 1423 by Sharron Herman RN  Outcome: Progressing Towards Goal  8/27/2019 1015 by Sharron Herman RN  Outcome: Progressing Towards Goal  Goal: *Monitoring blood glucose, interpreting and using results  Description  Identify recommended blood glucose targets  and personal targets.   8/27/2019 1423 by Sharron Herman RN  Outcome: Progressing Towards Goal  8/27/2019 1015 by Sharron Herman RN  Outcome: Progressing Towards Goal  Goal: *Prevention, detection, treatment of acute complications  Description  List symptoms of hyper- and hypoglycemia; describe how to treat low blood sugar and actions for lowering  high blood glucose level. 8/27/2019 1423 by Onur Chopra RN  Outcome: Progressing Towards Goal  8/27/2019 1015 by Onur Chopra RN  Outcome: Progressing Towards Goal  Goal: *Prevention, detection and treatment of chronic complications  Description  Define the natural course of diabetes and describe the relationship of blood glucose levels to long term complications of diabetes. 8/27/2019 1423 by Onur Chopra RN  Outcome: Progressing Towards Goal  8/27/2019 1015 by Onur Chopra RN  Outcome: Progressing Towards Goal  Goal: *Developing strategies to address psychosocial issues  Description  Describe feelings about living with diabetes; identify support needed and support network  8/27/2019 1423 by Onur Chopra RN  Outcome: Progressing Towards Goal  8/27/2019 1015 by Onur Chopra RN  Outcome: Progressing Towards Goal  Goal: *Insulin pump training  8/27/2019 1423 by Onur Chopra RN  Outcome: Progressing Towards Goal  8/27/2019 1015 by Onur Chopra RN  Outcome: Progressing Towards Goal  Goal: *Sick day guidelines  8/27/2019 1423 by Onur Chopra RN  Outcome: Progressing Towards Goal  8/27/2019 1015 by Onur Chopra RN  Outcome: Progressing Towards Goal  Goal: *Patient Specific Goal (EDIT GOAL, INSERT TEXT)  8/27/2019 1423 by Onur Chopra RN  Outcome: Progressing Towards Goal  8/27/2019 1015 by Onur Chopra RN  Outcome: Progressing Towards Goal     Problem: Falls - Risk of  Goal: *Absence of Falls  Description  Document Candida Lightning Fall Risk and appropriate interventions in the flowsheet.   8/27/2019 1423 by Onru Chopra RN  Outcome: Progressing Towards Goal  Note:   Fall Risk Interventions:  Mobility Interventions: Bed/chair exit alarm    Mentation Interventions: Bed/chair exit alarm    Medication Interventions: Bed/chair exit alarm    Elimination Interventions: Call light in reach           8/27/2019 1015 by Tonya Garduno RN  Outcome: Progressing Towards Goal  Note:   Fall Risk Interventions:  Mobility Interventions: Bed/chair exit alarm    Mentation Interventions: Bed/chair exit alarm    Medication Interventions: Bed/chair exit alarm    Elimination Interventions: Call light in reach              Problem: Falls - Risk of  Goal: *Absence of Falls  Description  Document Kerline Gabriel Fall Risk and appropriate interventions in the flowsheet. 8/27/2019 1423 by Tonya Garduno RN  Outcome: Progressing Towards Goal  Note:   Fall Risk Interventions:  Mobility Interventions: Bed/chair exit alarm    Mentation Interventions: Bed/chair exit alarm    Medication Interventions: Bed/chair exit alarm    Elimination Interventions: Call light in reach           8/27/2019 1015 by Tonya Garduno RN  Outcome: Progressing Towards Goal  Note:   Fall Risk Interventions:  Mobility Interventions: Bed/chair exit alarm    Mentation Interventions: Bed/chair exit alarm    Medication Interventions: Bed/chair exit alarm    Elimination Interventions: Call light in reach              Problem: Pressure Injury - Risk of  Goal: *Prevention of pressure injury  Description  Document Bartolome Scale and appropriate interventions in the flowsheet.   8/27/2019 1423 by Tonya Garduno RN  Outcome: Progressing Towards Goal  Note:   Pressure Injury Interventions:  Sensory Interventions: Assess changes in LOC, Chair cushion    Moisture Interventions: Absorbent underpads    Activity Interventions: Chair cushion, Increase time out of bed, Pressure redistribution bed/mattress(bed type)    Mobility Interventions: Chair cushion, HOB 30 degrees or less, Pressure redistribution bed/mattress (bed type)    Nutrition Interventions: Document food/fluid/supplement intake    Friction and Shear Interventions: Foam dressings/transparent film/skin sealants, HOB 30 degrees or less             8/27/2019 1015 by Maribell Holliday RN  Outcome: Progressing Towards Goal  Note:   Pressure Injury Interventions:  Sensory Interventions: Assess changes in LOC, Chair cushion    Moisture Interventions: Absorbent underpads    Activity Interventions: Chair cushion, Increase time out of bed, Pressure redistribution bed/mattress(bed type)    Mobility Interventions: Chair cushion, HOB 30 degrees or less, Pressure redistribution bed/mattress (bed type)    Nutrition Interventions: Document food/fluid/supplement intake    Friction and Shear Interventions: Foam dressings/transparent film/skin sealants, HOB 30 degrees or less                Problem: Pain  Goal: *Control of Pain  8/27/2019 1423 by Mendel Donna, RN  Outcome: Progressing Towards Goal  8/27/2019 1015 by Mendel Donna, RN  Outcome: Progressing Towards Goal  Goal: *PALLIATIVE CARE:  Alleviation of Pain  8/27/2019 1423 by Mendel Donna, RN  Outcome: Progressing Towards Goal  8/27/2019 1015 by Mendel Donna, RN  Outcome: Progressing Towards Goal     Problem: Dysphagia (Adult)  Goal: *Speech Goal: (INSERT TEXT)  Description  Long term goals  Patient will:  1. Tolerate regular diet with thin liquids using safe swallowing techniques without s/s of aspiration in 5/6 meals. 2. Use safe swallowing techniques (including slowed rate, small bites/sips, alternate liquids/solids, effortful swallow, head rotation to right for liquids) with supervision. 3. Participate in MBS study prior to advancing to thin liquids to assure safety (no aspiration). 4. Perform oral and pharyngeal strengthening exercises (to improve speech and swallowing) with supervision-modified independence. 5. Demonstrate 90% intelligibility in conversation using appropriate speaking strategies (slowed rate, overarticulation, increased volume). 6. Recall 3 words after 5 minutes with supervision. 7. Perform functional problem solving/reasoning tasks with 90% accuracy.   8. Name 8-10 items in categories of increasing abstraction, supervision. Short term goals (by 8/30/19)  Patient will:   1. Tolerate soft diet with nectar thick liquids using safe swallowing techniques without s/s of aspiration in 5/6 meals. 2. Use safe swallowing techniques (including slowed rate, small bites/sips, alternate liquids/solids, effortful swallow, head rotation to right for liquids) with min cues. 3. Tolerate small amounts of ice chips and water with the SLP using head rotation to the right and/or chin tuck, without overt s/s of aspiration. 4. Perform oral and pharyngeal strengthening exercises (to improve speech and swallowing) with min assist.  5. Demonstrate 90% intelligibility in polysyllabic words and sentences using appropriate speaking strategies (slowed rate, overarticulation, increased volume). 6. Recall 3 words after 5 minutes with mod assist.  7. Perform functional problem solving/reasoning tasks with 75-85% accuracy. 8. Name 8-10 items in concrete categories, supervision.       8/27/2019 1423 by Gi Edmondson RN  Outcome: Progressing Towards Goal  8/27/2019 1015 by Gi Edmondson RN  Outcome: Progressing Towards Goal     Problem: Injury - Risk of, Adverse Drug Event  Goal: *Absence of adverse drug events  8/27/2019 1423 by Gi Edmondson RN  Outcome: Progressing Towards Goal  8/27/2019 1015 by Gi Edmondson RN  Outcome: Progressing Towards Goal  Goal: *Absence of medication errors  8/27/2019 1423 by Gi Edmondson RN  Outcome: Progressing Towards Goal  8/27/2019 1015 by Gi Edmondson RN  Outcome: Progressing Towards Goal  Goal: *Knowledge of prescribed medications  8/27/2019 1423 by Gi Edmondson RN  Outcome: Progressing Towards Goal  8/27/2019 1015 by Gi Edmondson RN  Outcome: Progressing Towards Goal

## 2019-08-27 NOTE — CONSULTS
Consult Note  Consult requested by: Dr. Aleta Brody is a 71 y.o. male [facility] who is being seen on consult for CKD  No chief complaint on file. Admission diagnosis: Acute ischemic stroke ([facility] [address]     69y M with DM, HTN, CKD, admitted to rehab after stroke, seen for CKD  Impression & Plan:   IMPRESSION:   Chronic kidney disease stage III with heavy proteinuria, diabetic nephropathy  Proteinuria, diabetes related, not on any antiproteinuric medication   Hypertension, well controlled  Diabetes  Recent acute ischemic stroke  Chronic hypokalemia, on spiranolactone  Anemia  Secondary hyperparathyroidism   PLAN:   I will start him on IV Venofer, side effects discussed. Plan to start on Epogen once his iron store is repleted. Continue spiranolactone, monitor potassium. Monitor renal function for now, will add ACE inhibitor for protienuria in near future. Adjust medication for current renal function status. Thank you very much for allowing me to participate in the care of this patient. We will continue to monitor with you and make recommendations as needed.     Discussed with Dr. Xiang Carreno        HPI:  26-year-old male with past medical his gout admitted to the rehabilitation after ischemic stroke. Mister clayton has a history of diabetes, admitted his blood sugar is usually well controlled. He also has a history of hypertension and used to follow with his primary closely. He had an acute ischemic stroke on 12th of August, received TPA. He has not seen any nephrologist in the past.   He has a history of gout, , had frequent attack in the past.  He is taking allopurinol. He has a history of hypokalemia, evaluated by endocrine colleaugue Dr. Tanner Lawler. He is on spiranolactone twice daily. He denies for any difficulty in passing  or holding urine.     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     On Allopurinol    Chronic hypokalemia     Persistent chronic hypokalemia + hypertension; ?primary hyperaldosteronism    CKD (chronic kidney disease) stage 2, GFR 60-89 ml/min 8/15/2019    Current use of aspirin 8/14/2019    Dysarthria 8/12/2019    Dysphagia 8/12/2019    Elevated prostate specific antigen (PSA) 7/21/2011    First degree atrioventricular block by electrocardiogram 8/12/2019    Hypertensive heart and kidney disease without heart failure and with stage 2 chronic kidney disease     Iron deficiency anemia 8/14/2019    Anemia work-up (8/14/2019) showed serum iron 22, TIBC 371, iron % saturation 6, ferritin 34, reticulocyte count 1.4     Obesity, Class I, BMI 30-34.9     Obstructive sleep apnea on CPAP     On statin therapy due to risk of future cardiovascular event 8/14/2019    On Atorvastatin    Primary osteoarthritis of right ankle 8/22/2019    X-ray of the right ankle (8/22/2019) showed no acute fracture or subluxation; advanced degenerative changes at the tibiotalar joint; old fracture fragment vs. accessory ossicle in the inferior aspect of the lateral malleolus; soft tissue swelling around the ankle.  Pure hypercholesterolemia 08/15/2019    Lipid profile (8/13/2019) showed , , HDL 42, ; Lipid profile (8/14/2019) showed , , HDL 39, LDL 94    Received intravenous tissue plasminogen activator (tPA) in emergency department 8/12/2019    Refusal of statin medication by patient 8/13/2019    As per note of Neurology (Dr. Markos Live), patient refuses statin agent because of potential side effects.      Right hemiparesis ([address] 8/12/2019    Type 2 diabetes mellitus with stage 2 chronic kidney disease (HCC)     HbA1c (8/13/2019) = 6.6    Vitamin B12 deficiency anemia 8/14/2019    Vitamin B12 (8/14/2019) = 208    Vitamin D deficiency 8/19/2019    Vitamin D 25-Hydroxy (8/19/2019) = 16.2       Past Surgical History:   Procedure Laterality Date    COLONOSCOPY N/A 4/15/2019    COL[facility] performed [facility]a Sinclair [facility] ENDOSCOPY    HX TONSILLECTOMY         Social History     Socioeconomic History    Marital status: UNKNOWN     Spouse name: Not on file    Number of children: Not on file    Years of education: Not on file    Highest education level: Not on file   Occupational History    Not on file   Social Needs    Financial resource strain: Not on file    Food insecurity:     Worry: Not on file     Inability: Not on file    Transportation needs:     Medical: Not on file     Non-medical: Not on file   Tobacco Use    Smoking status: Former Smoker    Smokeless tobacco: Never Used   Substance and Sexual Activity    Alcohol use: No     Alcohol/week: 0.0 standard drinks    Drug use: No    Sexual activity: Not on file   Lifestyle    Physical activity:     Days per week: Not on file     Minutes per session: Not on file    Stress: Not on file   Relationships    Social connections:     Talks on phone: Not on file     Gets together: Not on file     Attends [employer] service: Not on file     Active member of club or organization: Not on file     Attends meetings of clubs or organizations: Not on file     Relationship status: Not on file    Intimate partner violence:     Fear of current or ex partner: Not on file     Emotionally abused: Not on file     Physically abused: Not on file     Forced sexual activity: Not on file   Other Topics Concern    Not on file   Social History Narrative    Not on file       Family History   Problem Relation Age of Onset    Cancer Mother         Gastric cancer    Heart Disease Father     Prostate Cancer Father      No Known Allergies     Home Medications:     Prior to Admission Medications   Prescriptions Last Dose Informant Patient Reported? Taking? ALLOPURINOL PO Unknown at Unknown time  Yes No   Sig: Take 300 mg by mouth daily. acetaminophen (TYLENOL) 325 mg tablet Unknown at Unknown time  No No   Sig: Take 2 Tabs by mouth every six (6) hours as needed for Pain. amLODIPine (NORVASC) 10 mg tablet 8/15/2019 at 0900  Yes Yes   Sig: Take  by mouth daily. aspirin delayed-release 81 mg tablet Unknown at Unknown time  No No   Sig: Take 1 Tab by mouth daily for 30 days. atorvastatin (LIPITOR) 80 mg tablet 8/14/2019 at 2200  No Yes   Sig: Take 1 Tab by mouth nightly. Indications: stroke prevention   gemfibrozil (LOPID) 600 mg tablet Unknown  Yes No   Sig: Take 600 mg by mouth two (2) times a day. hydrALAZINE (APRESOLINE) 50 mg tablet 8/15/2019 at 0900  No Yes   Sig: Take 1 Tab by mouth three (3) times daily. losartan-hydroCHLOROthiazide (HYZAAR) 100-25 mg per tablet Unknown at Unknown time Self Yes No   Sig: Take 1 Tab by mouth daily. magnesium oxide (MAG-OX) 400 mg tablet Unknown at Unknown time  No No   Sig: Take 1 Tab by mouth daily. metoprolol succinate (TOPROL-XL) 50 mg XL tablet 8/15/2019 at 0900  Yes Yes   Sig: Take  by mouth daily. potassium chloride (KLOR-CON) 20 mEq pack 8/15/2019 at 0900  No Yes   Sig: Take 2 Packets by mouth two (2) times daily (with meals) for 2 days.       Facility-Administered Medications: None       Current Facility-Administered Medications   Medication Dose Route Frequency    hydrALAZINE (APRESOLINE) tablet 100 mg  100 mg Oral Q8H    baclofen (LIORESAL) tablet 10 mg  10 mg Oral TID    labetalol (NORMODYNE) tablet 600 mg  600 mg Oral Q12H    spironolactone (ALDACTONE) tablet 50 mg  50 mg Oral BID    ferrous sulfate tablet 325 mg  1 Tab Oral DAILY WITH BREAKFAST    ascorbic acid (vitamin C) (VITAMIN C) tablet 250 mg  250 mg Oral DAILY WITH BREAKFAST    cyanocobalamin tablet 1,000 mcg  1,000 mcg Oral DAILY    cholecalciferol (VITAMIN D3) capsule 5,000 Units  5,000 Units Oral DAILY    isosorbide dinitrate (ISORDIL) tablet 30 mg  30 mg Oral Q8H    hydrALAZINE (APRESOLINE) tablet 25 mg  25 mg Oral TID PRN    acetaminophen (TYLENOL) tablet 650 mg  650 mg Oral Q4H PRN    bisacodyl (DULCOLAX) tablet 10 mg  10 mg Oral Q48H PRN    heparin (porcine) injection 5,000 Units  5,000 Units SubCUTAneous Q8H    insulin lispro (HUMALOG) injection   SubCUTAneous TIDAC    magnesium oxide (MAG-OX) tablet 400 mg  400 mg Oral DAILY    aspirin chewable tablet 81 mg  81 mg Oral DAILY WITH BREAKFAST    amLODIPine (NORVASC) tablet 10 mg  10 mg Oral DAILY    atorvastatin (LIPITOR) tablet 80 mg  80 mg Oral QHS       Review of Systems:     Complete 10-point review of systems were obtained and discussed in length  with the patient. Complete review of systems was negative/unremarkable  except as mentioned in HPI section. Data Review:    Labs: Results:       Chemistry Recent Labs     08/26/19  0608   GLU 89      K 4.4      CO2 26   BUN 30*   CREA 1.49*   CA 8.6   AGAP 3   BUCR 20      CBC w/Diff Recent Labs     08/26/19  0608   HGB 8.2*   HCT 25.6*         Coagulation No results for input(s): PTP, INR, APTT in the last 72 hours. No lab exists for component: INREXT    Iron/Ferritin No results for input(s): IRON in the last 72 hours. No lab exists for component: TIBCCALC   BNP No results for input(s): BNPP in the last 72 hours. Cardiac Enzymes No results for input(s): CPK, CKND1, DANY in the last 72 hours. No lab exists for component: CKRMB, TROIP   Liver Enzymes No results for input(s): TP, ALB, TBIL, AP, SGOT, GPT in the last 72 hours.     No lab exists for component: DBIL   Thyroid Studies Lab Results   Component Value Date/Time    TSH 4.63 (H) 08/19/2019 06:55 AM         EKG: sinus     Physical Assessment:     Visit Vitals  /73   Pulse (!) 56   Temp 98.1 °F (36.7 °C)   Resp 18   Ht 5' 9\" (1.753 m)   Wt 102 kg (224 lb 14.4 oz)   SpO2 97%   BMI 33.21 kg/m²     Weight change:     Intake/Output Summary (Last 24 hours) at 8/27/2019 1739  Last data filed at 8/27/2019 1315  Gross per 24 hour   Intake 660 ml   Output 2400 ml   Net -1740 ml     Physical Exam:   General: comfortable, no acute distress   HEENT sclera anicteric, supple neck, no thyromegaly  CVS: S1S2 heard,  no rub  RS: + air entry b/l,   Abd: Soft, Non tender, Not distended,   Neuro: non focal, awake, alert , CN II-XII are grossly intact  Extrm: + edema, no cyanosis, clubbing   Skin: no visible  Rash  Musculoskeletal: No gross joints or bone deformities     Procedures/imaging: see electronic medical records for all procedures, Xrays and details which were not copied into this note but were reviewed prior to creation of Keith Gardner MD  August 27, 2019  [facility]  Office [phone]

## 2019-08-27 NOTE — ROUTINE PROCESS
SHIFT CHANGE NOTE FOR [facility]    Bedside and Verbal shift change report given to Martha Gil RN (oncoming nurse) by Margarette Hoover RN   (offgoing nurse). Report included the following information SBAR, Kardex, MAR and Recent Results. Situation:   Code Status: Full Code   Reason for Admission: CVA  Hospital Day: 12   Problem List:   Hospital Problems  Date Reviewed: 8/27/2019          Codes Class Noted POA    Primary osteoarthritis of right ankle (Chronic) ICD-10-CM: M19.071  ICD-9-CM: 715.17  8/22/2019 Yes    Overview Signed 8/23/2019 11:54 AM by Ami Cole MD     X-ray of the right ankle (8/22/2019) showed no acute fracture or subluxation; advanced degenerative changes at the tibiotalar joint; old fracture fragment vs. accessory ossicle in the inferior aspect of the lateral malleolus; soft tissue swelling around the ankle.              Vitamin D deficiency (Chronic) ICD-10-CM: E55.9  ICD-9-CM: 268.9  8/19/2019 Yes    Overview Signed 8/19/2019  1:34 PM by Ami Cole MD     Vitamin D 25-Hydroxy (8/19/2019) = 16.2              Leukocytosis ICD-10-CM: F33.450  ICD-9-CM: 288.60  8/16/2019 Yes    Overview Signed 8/16/2019 11:19 AM by Ami Cole MD     WBC count (8/16/2019) = 17.9             Hypertensive heart and kidney disease without heart failure and with stage 2 chronic kidney disease (Chronic) ICD-10-CM: I13.10, N18.2  ICD-9-CM: 404.90, 585.2  Unknown Yes        Chronic hypokalemia ICD-10-CM: E87.6  ICD-9-CM: 276.8  Unknown Yes    Overview Addendum 8/15/2019 11:31 AM by Ami Cole MD     Persistent chronic hypokalemia + hypertension; ?primary hyperaldosteronism             Type 2 diabetes mellitus with stage 2 chronic kidney disease ([facility] [address] (Chronic) ICD-10-CM: E11.22, N18.2  ICD-9-CM: 250.40, 585.2  Unknown Yes    Overview Signed 8/15/2019 12:26 PM by Ami Cole MD     HbA1c (8/13/2019) = 6.6             Pure hypercholesterolemia (Chronic) ICD-10-CM: E78.00  ICD-9-CM: 272.0  8/15/2019 Yes    Overview Addendum 8/15/2019  6:27 PM by Lianet James MD     Lipid profile (8/13/2019) showed , , HDL 42, ; Lipid profile (8/14/2019) showed , , HDL 39, LDL 94             Current use of aspirin ICD-10-CM: Z79.82  ICD-9-CM: V58.66  8/14/2019 Yes        On statin therapy due to risk of future cardiovascular event ICD-10-CM: Z79.899  ICD-9-CM: V58.69  8/14/2019 Yes    Overview Signed 8/15/2019 12:21 PM by Lianet James MD     On Atorvastatin             Vitamin B12 deficiency anemia (Chronic) ICD-10-CM: D51.9  ICD-9-CM: 281.1  8/14/2019 Yes    Overview Signed 8/19/2019  1:24 PM by Lianet James MD     Vitamin B12 (8/14/2019) = 208             Iron deficiency anemia (Chronic) ICD-10-CM: D50.9  ICD-9-CM: 280.9  8/14/2019 Yes    Overview Signed 8/19/2019  1:25 PM by Lianet James MD     Anemia work-up (8/14/2019) showed serum iron 22, TIBC 371, iron % saturation 6, ferritin 34, reticulocyte count 1.4               Refusal of statin medication by patient ICD-10-CM: Z53.29  ICD-9-CM: V64.2  8/13/2019 Yes    Overview Signed 8/15/2019  2:01 PM by Lianet James MD     As per note of Neurology (Dr. Norman Bautista), patient refuses statin agent because of potential side effects.               * (Principal) Acute ischemic stroke [facility]) ICD-10-CM: I63.9  ICD-9-CM: 434.91  8/12/2019 Yes    Overview Signed 8/15/2019 12:17 AM by Lianet James MD     Acute Ischemic Stroke (acute/early subacute infarct at the left posterior basal ganglia to corona radiata) with residual right hemiparesis, dysphagia and dysarthria             Impaired mobility and ADLs ICD-10-CM: Z74.09  ICD-9-CM: 799.89  8/12/2019 Yes        Received intravenous tissue plasminogen activator (tPA) in emergency department ICD-10-CM: Z92.82  ICD-9-CM: V45.88  8/12/2019 Yes        Right hemiparesis ([address] ICD-10-CM: G81.91  ICD-9-CM: 342.90  8/12/2019 Yes        Dysphagia ICD-10-CM: R13.10  ICD-9-CM: 787.20  8/12/2019 Yes        Dysarthria ICD-10-CM: R47.1  ICD-9-CM: 784.51  8/12/2019 Yes              Background:   Past Medical History:   Past Medical History:   Diagnosis Date    Acute ischemic stroke ([facility] [address] 8/12/2019    Acute Ischemic Stroke (acute/early subacute infarct at the left posterior basal ganglia to corona radiata) with residual right hemiparesis, dysphagia and dysarthria    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     On Allopurinol    Chronic hypokalemia     Persistent chronic hypokalemia + hypertension; ?primary hyperaldosteronism    CKD (chronic kidney disease) stage 2, GFR 60-89 ml/min 8/15/2019    Current use of aspirin 8/14/2019    Dysarthria 8/12/2019    Dysphagia 8/12/2019    Elevated prostate specific antigen (PSA) 7/21/2011    First degree atrioventricular block by electrocardiogram 8/12/2019    Hypertensive heart and kidney disease without heart failure and with stage 2 chronic kidney disease     Iron deficiency anemia 8/14/2019    Anemia work-up (8/14/2019) showed serum iron 22, TIBC 371, iron % saturation 6, ferritin 34, reticulocyte count 1.4     Obesity, Class I, BMI 30-34.9     Obstructive sleep apnea on CPAP     On statin therapy due to risk of future cardiovascular event 8/14/2019    On Atorvastatin    Primary osteoarthritis of right ankle 8/22/2019    X-ray of the right ankle (8/22/2019) showed no acute fracture or subluxation; advanced degenerative changes at the tibiotalar joint; old fracture fragment vs. accessory ossicle in the inferior aspect of the lateral malleolus; soft tissue swelling around the ankle.     Pure hypercholesterolemia 08/15/2019    Lipid profile (8/13/2019) showed , , HDL 42, ; Lipid profile (8/14/2019) showed , , HDL 39, LDL 94    Received intravenous tissue plasminogen activator (tPA) in emergency department 8/12/2019    Refusal of statin medication by patient 8/13/2019    As per note of Neurology (Dr. Bertha Jhaveri), patient refuses statin agent because of potential side effects.  Right hemiparesis ([address] 8/12/2019    Type 2 diabetes mellitus with stage 2 chronic kidney disease (HCC)     HbA1c (8/13/2019) = 6.6    Vitamin B12 deficiency anemia 8/14/2019    Vitamin B12 (8/14/2019) = 208    Vitamin D deficiency 8/19/2019    Vitamin D 25-Hydroxy (8/19/2019) = 16.2       Patient taking anticoagulants yes Heparin, ASA   Patient has a defibrillator: no     Assessment:   Changes in Assessment throughout shift: No     Patient has central line: no Reasons if yes: Insertion date: Last dressing date:   Patient has Boykin Cath: no Reasons if yes:     Insertion date:     Last Vitals:     Vitals:    08/26/19 2100 08/27/19 0555 08/27/19 0719 08/27/19 1520   BP: 183/89 169/70 147/69 169/73   Pulse: 66 61 (!) 58 (!) 56   Resp: 18  18 18   Temp: 97 °F (36.1 °C)  98.1 °F (36.7 °C) 98.1 °F (36.7 °C)   SpO2: 98%  98% 97%   Weight:       Height:            PAIN    Pain Assessment    Pain Intensity 1: 0 (08/27/19 1634) Pain Intensity 1: 2 (12/29/14 1105)    Pain Location 1: Shoulder Pain Location 1: Abdomen    Pain Intervention(s) 1: Medication (see MAR) Pain Intervention(s) 1: Medication (see MAR)  Patient Stated Pain Goal: 0 Patient Stated Pain Goal: 0  o Intervention effective: no    o Other actions taken for pain:      Skin Assessment  Skin color Skin Color: Appropriate for ethnicity  Condition/Temperature Skin Condition/Temp: Dry, Warm  Integrity Skin Integrity: Intact, Tear  Turgor Turgor: Epidermis thin w/ loss of subcut tissue  Weekly Pressure Ulcer Documentation  Pressure  Injury Documentation: No Pressure Injury Noted-Pressure Ulcer Prevention Initiated  Wound Prevention & Protection Methods  Orientation of wound Orientation of Wound Prevention: Posterior  Location of Prevention Location of Wound Prevention: Sacrum/Coccyx  Dressing Present Dressing Present : Intact, not due to be changed, Yes  Dressing Status Dressing Status: Intact  Wound Offloading Wound Offloading (Prevention Methods): Bed, pressure reduction mattress     INTAKE/OUPUT  Date 08/26/19 0700 - 08/27/19 0659 08/27/19 0700 - 08/28/19 0659   Shift 0700-1859 1900-0659 24 Hour Total 9454-0754 6465-9926 24 Hour Total   INTAKE   P.O. 240  240 420  420     P. O. 240  240 420  420   Shift Total(mL/kg) 240(2.4)  240(2.4) 420(4.1)  420(4.1)   OUTPUT   Urine(mL/kg/hr)  2400(2) 2400(1)        Urine Voided  2400 2400        Urine Occurrence(s) 4 x 6 x 10 x 2 x  2 x   Stool           Stool Occurrence(s) 2 x 0 x 2 x 1 x  1 x   Shift Total(mL/kg)  2400(23.5) 2400(23.5)       -2400 -2160 420  420   Weight (kg) 102 102 102 102 102 102       Recommendations:  1. Patient needs and requests:     2. Diet: Cardiac Mech Soft Nectar Thick     3. Pending tests/procedures:      4. Functional Level/Equipment:     5. Estimated Discharge Date: 8/24/19 Posted on Whiteboard in Patients Room: yes     HEALS Safety Check    A safety check occurred in the patient's room between off going nurse and oncoming nurse listed above. The safety check included the below items  Area Items   H  High Alert Medications - Verify all high alert medication drips (heparin, PCA, etc.)   E  Equipment - Suction is set up for ALL patients (with diann)  - Red plugs utilized for all equipment (IV pumps, etc.)  - WOWs wiped down at end of shift.  - Room stocked with oxygen, suction, and other unit-specific supplies   A  Alarms - Bed alarm is set for fall risk patients  - Ensure chair alarm is in place and activated if patient is up in a chair   L  Lines - Check IV for any infiltration  - Boykin bag is empty if patient has a Boykin   - Tubing and IV bags are labeled   S  Safety   - Room is clean, patient is clean, and equipment is clean. - Hallways are clear from equipment besides carts.    - Fall bracelet on for fall risk patients  - Ensure room is clear and free of clutter  - Suction is set up for ALL patients (with diann)  - Hallways are clear from equipment besides carts.    - Isolation precautions followed, supplies available outside room, sign posted

## 2019-08-27 NOTE — INTERDISCIPLINARY ROUNDS
[facility] PHYSICAL REHABILITATION  [address] [place] [address], [address]    INPATIENT REHABILITATION  TEAM CONFERENCE SUMMARY     Date of Conference: 8/27/2019    Patient Information:        Name: Meghana Bolton Age / Sex: 71 y.o. / male   CSN: [id number] MRN: [id number]   [facility] Date: 8/15/2019 Length of Stay: 12 days     Primary Rehabilitation Diagnosis  1. Impaired Mobility and ADLs  2. Acute Ischemic Stroke (acute/early subacute infarct at the left posterior basal ganglia to corona radiata) with residual right hemiparesis, dysphagia and dysarthria     Comorbidities   Obesity, Class I, BMI 30-34.9 E66.9    Obstructive sleep apnea on CPAP G47.33, Z99.89    Hypertensive heart and kidney disease without heart failure and with stage 2 chronic kidney disease I13.10, N18.2    Chronic hypokalemia E87.6    CKD (chronic kidney disease) stage 2, GFR 60-89 ml/min N18.2    Current use of aspirin Z79.82    On statin therapy due to risk of future cardiovascular event Z79.899    Type 2 diabetes mellitus with stage 2 chronic kidney disease  E11.22, N18.2    Pure hypercholesterolemia E78.00    Elevated prostate specific antigen (PSA) R97.20    Refusal of statin medication by patient Z48.34    First degree atrioventricular block by electrocardiogram I44.0    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 20X0    Leukocytosis D72.829    Iron deficiency anemia D50.9    Vitamin B12 anemia D51.9    Vitamin D deficiency E55. 9        Therapy:     FIM SCORES Initial Assessment Weekly Progress Assessment 8/27/2019   Eating Functional Level: 5  Comments: Pt reporting requiring set-up of container mgmt for self feeding. Pt able to use dominant left hand to manage utensils.   5 (8/23)   Swallowing     Grooming 3  5 (8/23)   Bathing 2  4 (8/23)      Upper Body Dressing Functional Level: 4  Items Applied/Steps Completed: Pullover (4 steps)  Comments: Pt educated on karthik technique to cecily shirt with Michael overall.  Pt doffed shirt with SBA.   4 (8/23)   Lower Body Dressing Functional Level: 2  Items Applied/Steps Completed: Elastic waist pants (3 steps), Sock, left (1 step), Sock, right (1 step), Underpants (3 steps)  Comments: Pt doffed socks with modA, donned socks with TA. LB clothing score reflected from pt performing toileting task, requiring maxA for clothing mgmt   4 (8/23)   Toileting Functional Level: 2  Comments: Pt able to initiate to wipe but requiring MaxA for thoroughness. Pt required maxA overall for clothing mgmt with pt initiating to assist. Pt encouraged to focus on steadying balance in standing with additional person present assisting for clothing and buttocks cleansing. Toileting Assistance (FIM Score): 5 (Supervision)(SBA attempts to use urinal without success)4 (Mod I urinal, Min A BM hygiene) (8/23)   Bladder - level of assist 5     Bladder - accident frequency score 6     Bowel - level of assist 2     Bowel - accident frequency score 6     Toilet Transfer Major Toilet Transfer Score: 1  Comments: modA x 2. Second staff member present for safety and (A) with initial sit<>stand. 3 (SPT 8/23)   Tub/Shower Transfer Major Tub or Shower Type: Tub/Shower combination  Tub/Shower Transfer Score: 0  Comments: NT 2/2 to safety and decreased functional transfer (I).  Bathing performed at sink this AM  3 (SPT 8/23)      Comprehension Primary Mode of Comprehension: Auditory  Score: 4  Auditory   5      Expression Primary Mode of Expression: Verbal  Score: 4  Comments: Pt requiring increased time and repetitions to make needs known 2/2 to facial droop impeding expression   Verbal 5      Social Interaction Score: 4  Comments: Pt interacting appropriately with OT    5   Problem Solving Score: 4  Comments: Pt requiring Michael for problem solving body mechanics prior to toileting transfer    5   Memory Score: 4  4     FIM SCORES Initial Assessment Weekly Progress Assessment 8/27/2019   Bed/Chair/Wheelchair Transfers Transfer Type: (stand step without assistive device)  Other: stand step txfr without AD  Transfer Assistance : 2 (Maximal assistance)  Sit to Stand Assistance: Maximum assistance     Bed Mobility Rolling Right 6 (Modified independent)   Rolling Left 3 (Moderate assistance )   Supine to Sit 3 (Moderate assistance)   Sit to Stand Maximum assistance   Sit to Supine 3 (Moderate assistance)    Rolling Right    6 mod I   Rolling Left    4 min A   Supine to Sit    4 min A/CGA   Sit to Stand    CGA from 23 inch seat  Mod A from 20 inch seat   Sit to Supine    min A      Locomotion (W/C) Able to Propel (ft): 300 feet  Functional Level: 5  Curbs/Ramps Assist Required (FIM Score): 0 (Not tested)  Wheelchair Setup Assist Required : 3 (Moderate assistance)(for right brake and leg rest)  Wheelchair Management: Manages left brake Function  propels >300 feet with supervision on level surface    Setup Assistance- patient manages B brakes, requires assistance for lap tray and right leg rest         Locomotion (W/C distance)    >300 feet   Locomotion (Walk) 2 (Maximal assistance)  2      Locomotion (Walk dist.) 3 Feet (ft)  20 feet with WBQC and min/mod A   Steps/Stairs    NT         Nursing:     Neuro:   AAA&O x  4          Respiratory:   [x] WNL   [] O2 LPM:   Other:  Peripheral Vascular:   [] TEDS present   [x] Edema present _2+___ Grade   Cardiac:   [x] WNL   [] Other  Genitourinary:   [x] continent   [] incontinent   [] perkins  Abdominal __8/26/19_____ LBM  GI: _cardiac soft______ Diet ___nectar___ Liquids _____ tube feeds  Musculoskeletal: ____ ROM Transfers _wheelchair____ Assistive Device Used  _x1___ Level of Assistance  Skin Integumentary:   [x] Intact   [] Not Intact   __________Preventative Measures  Details______________________________________________________________  Pain: [x] Controlled   [] Not Controlled   Pain Meds:   [] Scheduled   [] PRN        Registered Dietitian / Nutrition:     Patient Vitals for the past 100 hrs:   % Diet Eaten   08/27/19 1315 75 %   08/27/19 0900 100 %   08/26/19 1830 75 %   08/24/19 1318 100 %   08/24/19 0932 100 %   08/23/19 1829 100 %   08/23/19 1257 100 %     Pt reported excellent appetite and meal intake. Tolerating diet. Denied having any concerns at time of visit. Supplements:          [] Yes   [x] No      Amount of supplement consumed:  Not applicable       Intake/Output Summary (Last 24 hours) at 8/27/2019 1409  Last data filed at 8/27/2019 1315  Gross per 24 hour   Intake 660 ml   Output 2400 ml   Net -1740 ml                                Last bowel movement: 8/25      Interdisciplinary Team Goals:     1. Discipline  Physical Therapy    Goal  perform sit to stand from w/c with CGA    Barrier  decreased strength and balance    Intervention  transfer training, therapeutic exercise, therapeutic activity    Goal written by:   Yasmine Garza, PT     2. Discipline  Occupational Therapy    Goal  Pt will decrease pacing and increase rest breaks during self-directed and therapist directed ROM to increase energy conservation and prevent muscle fatigue. Barrier  Cognitive, emotional, motivation towards recovery    Intervention  Education, theract, therex     Goal written by:  DARELL Zuleta      3. Discipline  Speech Therapy    Goal  Patient will use safe swallowing strategies with all PO intake, minimal cues from SLP/wife. Barrier  Moderate dysphagia, dysarthria, impulsivity    Intervention  Oral/pharyngeal strengthening exercises, speech strategies to improve intelligibility, speech drill, training in swallowing strategies, MBS as needed. Goal written by:  Mario Gaston CCC-SLP     4. Discipline  Nursing    Goal  Maintain/increase strength and function of affected or compensatory body part. Barrier  weakness; paresthesia    Intervention  Assist patient with exercise and perform ROM exercises for both the affected and unaffected sides.  Teach and encourage patient to use his unaffected side to exercise his affected side      Goal written by:  Meli Sandoval RN     5. Discipline  Clinical Psychology    Goal  Continue stroke education and maximize self concept and self esteem    Barrier  Limited insight about stroke occurrence and recovery    Intervention  Patient/stroke education and support     Goal written by:  Sushma Lobo, PhD     6. Discipline  Nutrition / Dietetics    Goal  Po intake of meals will meet >75% of patient estimated nutritional needs within the next  days. Outcome:  [x] Met/Ongoing    [] Progressing towards goal    [] Not progressing towards goal    [] New/Initial goal     Barrier  none known     Intervention  continue po diet    Goal written by:  Darin Marquis RD       Disposition / Discharge Planning:      Follow-up services:  [x] Physical Therapy             [x] Occupational Therapy       [x] Speech Therapy           [] Skilled Nursing      [] Medical Social Worker   [] Aide        [] Outpatient     [] Home Health   [] [address]   [x] [facility]   DME recommendations:  tbd   Estimated discharge date:  9/6/19   Discharge Location:  [] Home   [x] [facility]   [] [facility]             [] Other:          Interdisciplinary team rounds were held this PM with the following team members:       Name   Physical Therapist    Kevin Damian, PT, DPT   Occupational Therapist    CHELSEA Ariza/ROVERTO   Speech Therapist    Marisela Koo, [address] [facility]   Recreational Therapist    Siobhan Esparza, [address] [facility] RN   Physician    Domi Rawls MD        TEAGAN Walter       Signed:  Domi Rawls MD    August 27, 2019

## 2019-08-27 NOTE — PROGRESS NOTES
[facility]  [address] [place] [address], [address]     INPATIENT REHABILITATION  DAILY PROGRESS NOTE     Date: 8/27/2019    Name: Daniella Lopez Age / Sex: 71 y.o. / male   CSN: [id number] MRN: [id number]   [facility] Date: 8/15/2019 Length of Stay: 12 days     Primary Rehab Diagnosis: Impaired Mobility and ADLs secondary to Acute Ischemic Stroke (acute/early subacute infarct at the left posterior basal ganglia to corona radiata) with residual right hemiparesis, dysphagia and dysarthria      Subjective:     Patient seen and examined. Blood pressure fairly controlled. Blood glucose controlled. Team conference was held at bedside this PM.     Patient's Complaint:   No significant medical complaints    Pain Control: no current joint or muscle symptoms, essentially pain-free      Objective:     Vital Signs:  Patient Vitals for the past 24 hrs:   BP Temp Pulse Resp SpO2   08/27/19 0719 147/69 98.1 °F (36.7 °C) (!) 58 18 98 %   08/27/19 0555 169/70 -- 61 -- --   08/26/19 2100 183/89 97 °F (36.1 °C) 66 18 98 %   08/26/19 1430 140/64 98.4 °F (36.9 °C) 61 18 98 %        Physical Examination:  GENERAL SURVEY: Patient is awake, alert, oriented x 3, sitting comfortably on the chair, not in acute respiratory distress. HEENT: pink palpebral conjunctivae, anicteric sclerae, no nasoaural discharge, moist oral mucosa  NECK: supple, no jugular venous distention, no palpable lymph nodes  CHEST/LUNGS: symmetrical chest expansion, good air entry, clear breath sounds  HEART: adynamic precordium, good S1 S2, no S3, regular rhythm, no murmurs  ABDOMEN: obese, bowel sounds appreciated, soft, non-tender  EXTREMITIES: pink nailbeds, no edema except for right ankle swelling, full and equal pulses, no calf tenderness   NEUROLOGICAL EXAM: The patient is awake, alert and oriented x3, able to answer questions fairly appropriately, able to follow 1 and 2 step commands. Able to tell time from the wall clock. Cranial nerves II-XII are grossly intact except for slightly shallow right nasolabial fold, dysarthria and dysphagia. No gross sensory deficit. Motor strength is 5/5 on the LUE and LLE, 0/5 on the RUE (except for 1/5 on the right shoulder), 2+/5 on RLE (except for 0/5 on the right ankle/foot).       Current Medications:  Current Facility-Administered Medications   Medication Dose Route Frequency    baclofen (LIORESAL) tablet 10 mg  10 mg Oral TID    labetalol (NORMODYNE) tablet 600 mg  600 mg Oral Q12H    spironolactone (ALDACTONE) tablet 50 mg  50 mg Oral BID    ferrous sulfate tablet 325 mg  1 Tab Oral DAILY WITH BREAKFAST    ascorbic acid (vitamin C) (VITAMIN C) tablet 250 mg  250 mg Oral DAILY WITH BREAKFAST    cyanocobalamin tablet 1,000 mcg  1,000 mcg Oral DAILY    cholecalciferol (VITAMIN D3) capsule 5,000 Units  5,000 Units Oral DAILY    isosorbide dinitrate (ISORDIL) tablet 30 mg  30 mg Oral Q8H    hydrALAZINE (APRESOLINE) tablet 25 mg  25 mg Oral TID PRN    acetaminophen (TYLENOL) tablet 650 mg  650 mg Oral Q4H PRN    bisacodyl (DULCOLAX) tablet 10 mg  10 mg Oral Q48H PRN    heparin (porcine) injection 5,000 Units  5,000 Units SubCUTAneous Q8H    insulin lispro (HUMALOG) injection   SubCUTAneous TIDAC    magnesium oxide (MAG-OX) tablet 400 mg  400 mg Oral DAILY    aspirin chewable tablet 81 mg  81 mg Oral DAILY WITH BREAKFAST    amLODIPine (NORVASC) tablet 10 mg  10 mg Oral DAILY    atorvastatin (LIPITOR) tablet 80 mg  80 mg Oral QHS    hydrALAZINE (APRESOLINE) tablet 75 mg  75 mg Oral Q8H       Allergies:  No Known Allergies      Functional Progress:    OCCUPATIONAL THERAPY    ON ADMISSION MOST RECENT   Eating  Functional Level: 5   Eating  Functional Level: 5     Grooming  Functional Level: 3   Grooming  Functional Level: 5     Bathing  Functional Level: 2   Bathing  Functional Level: 3     Upper Body Dressing  Functional Level: 4   Upper Body Dressing  Functional Level: 4     Lower Body Dressing  Functional Level: 2   Lower Body Dressing  Functional Level: 4     Toileting  Functional Level: 2   Toileting  Functional Level: 4     Toilet Transfers  Toilet Transfer Score: 1   Toilet Transfers  Toilet Transfer Score: 3     Tub /Shower Transfers  Tub/Shower Transfer Score: 0   Tub/Shower Transfers  Tub/Shower Transfer Score: 3       Legend:   7 - Independent   6 - Modified Independent   5 - Standby Assistance / Supervision / Set-up   4 - Minimum Assistance / Contact Guard Assistance   3 - Moderate Assistance   2 - Maximum Assistance   1 - Total Assistance / Dependent       Lab/Data Review:  Recent Results (from the past 24 hour(s))   GLUCOSE, POC    Collection Time: 08/26/19  5:26 PM   Result Value Ref Range    Glucose (POC) 118 (H) 70 - 110 mg/dL   GLUCOSE, POC    Collection Time: 08/26/19  9:35 PM   Result Value Ref Range    Glucose (POC) 122 (H) 70 - 110 mg/dL   MICROALBUMIN, UR, RAND W/ MICROALB/CREAT RATIO    Collection Time: 08/26/19 10:32 PM   Result Value Ref Range    Microalbumin,urine random 56.00 (H) 0 - 3.0 MG/DL    Creatinine, urine 31.00 30 - 125 mg/dL    Microalbumin/Creat ratio (mg/g creat) 1,806 (H) 0 - 30 mg/g   GLUCOSE, POC    Collection Time: 08/27/19  7:18 AM   Result Value Ref Range    Glucose (POC) 108 70 - 110 mg/dL   GLUCOSE, POC    Collection Time: 08/27/19 12:41 PM   Result Value Ref Range    Glucose (POC) 113 (H) 70 - 110 mg/dL       Estimated Glomerular Filtration Rate:  On admission, estimated GFR based on a Creatinine of 1.20 mg/dl:              Using CKD-EPI = 63.1 mL/min/1.73m2              Using MDRD = 64.6 mL/min/1.73m2  Most recent estimated GFR, based on a Creatinine of 1.49 mg/dl on 8/26/2019:   Using CKD-EPI = 48.6 mL/min/1.73m2   Using MDRD = 57.4 mL/min/1.73m2       Assessment:     Primary Rehabilitation Diagnosis  1. Impaired Mobility and ADLs  2.  Acute Ischemic Stroke (acute/early subacute infarct at the left posterior basal ganglia to corona radiata) with residual right hemiparesis, dysphagia and dysarthria     Comorbidities   Obesity, Class I, BMI 30-34.9 E66.9    Obstructive sleep apnea on CPAP G47.33, Z99.89    Hypertensive heart and kidney disease without heart failure and with stage 2 chronic kidney disease I13.10, N18.2    Chronic hypokalemia E87.6    CKD (chronic kidney disease) stage 2, GFR 60-89 ml/min N18.2    Current use of aspirin Z79.82    On statin therapy due to risk of future cardiovascular event Z79.899    Type 2 diabetes mellitus with stage 2 chronic kidney disease  E11.22, N18.2    Pure hypercholesterolemia E78.00    Elevated prostate specific antigen (PSA) R97.20    Refusal of statin medication by patient Z48.34    First degree atrioventricular block by electrocardiogram I44.0    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 20X0    Leukocytosis D72.829    Iron deficiency anemia D50.9    Vitamin B12 anemia D51.9    Vitamin D deficiency E55. 9        Plan:     1. Justification for continued stay: Good progression towards established rehabilitation goals. 2. Medical Issues being followed closely:    [x]  Fall and safety precautions     []  Wound Care     [x]  Bowel and Bladder Function     [x]  Fluid Electrolyte and Nutrition Balance     []  Pain Control      3. Issues that 24 hour rehabilitation nursing is following:    [x]  Fall and safety precautions     []  Wound Care     [x]  Bowel and Bladder Function     [x]  Fluid Electrolyte and Nutrition Balance     []  Pain Control      [x]  Assistance with and education on in-room safety with transfers to and from the bed, wheelchair, toilet and shower. 4. Acute rehabilitation plan of care:    [x]  Continue current care and rehab. [x]  Physical Therapy           [x]  Occupational Therapy           [x]  Speech Therapy     []  Hold Rehab until further notice     5. Medications:    [x]  MAR Reviewed     [x]  Continue Present Medications     6.  DVT Prophylaxis:      [] Lovenox     [x]  Unfractionated Heparin     []  Coumadin     []  NOAC     []  SUZAN Stockings     []  Sequential Compression Device     []  None     7.  Orders:   > Acute Ischemic Stroke (acute/early subacute infarct at the left posterior basal ganglia to corona radiata) with residual right hemiparesis, dysphagia and dysarthria; S/P Administration of intravenous tPA (8/12/2019 - [facility])   > On 8/22/2019, started Baclofen 5 mg PO TID   > On 8/27/2019, increased Baclofen from 5 mg to 10 mg PO TID   > Continue:    > Aspirin 81 mg PO once daily with breakfast    > Atorvastatin 80 mg PO q HS    > Baclofen 10 mg PO TID    > Chronic hypokalemia    Serum Potassium during hospital stay at [facility]      08/15/19  0330 08/14/19  1848 08/14/19  0342 08/13/19  1205 08/12/19  2328   K 3.0* 2.8* 2.7* 2.8* 2.4*          > K (8/16/2019, on admission) = 3.8   > Mg (8/16/2019, on admission) = 1.9   > Upon admission to the ARU, patient was given Klor Con 40 meq PO BID with meals x 2 days   > K (8/17/2019) = 3.5   > K (8/18/2019) = 3.7   > K (8/19/2019) = 3.5   > K (8/22/2019) = 3.6   > Mg (8/22/2019) = 2.2   > On 8/22/2019, started Spironolactone 50 mg PO BID   > K (8/24/2019) = 3.8   > K (8/26/2019) = 4.4   > Will need to monitor serum potassium levels closely as patient is at high risk for hyperkalemia due slowly rising serum potassium levels since starting high-dose Spironolactone; planned to add HCTZ or Furosemide to offset the potassium-sparing characteristics of Spironolactone but will hold off due to rising BUN/Creatinine levels   > Continue:    > Spironolactone 50 mg PO BID    > Mg(OH)2 400 mg PO once daily    > Hypertensive heart and kidney disease without heart failure and with stage 2 chronic kidney disease   > Upon admission to the ARU, increased Hydralazine from 50 mg to 75 mg PO q 8 hr (6AM, 2PM, 10PM)   > Once work-up for primary hyperaldosteronism is complete, plan to start Spironolactone 25 mg PO once daily   > On 8/16/2019:    > Discontinued Metoprolol succinate 50 mg PO once daily (6AM)    > Started:     > Labetalol 200 mg PO q 12 hr (9AM, 9PM)     > Isosorbide dinitrate 10 mg P) q 8 hr (6AM, 2PM, 10PM)   > On 8/18/2019, added Hydralazine 25 mg PO TID PRN for SBP greater than 170 mmHg   > On 8/19/2019:    > Increased Labetalol from 200 mg to 300 mg PO q 12 hr (9AM, 9PM)    > Increased Isosorbide dinitrate from 10 mg to 20 mg PO q 8 hr (6AM, 2PM, 10PM)   > On 8/20/2019:    > Increased Labetalol from 300 mg to 400 mg PO q 12 hr (9AM, 9PM)    > Increased Isosorbide dinitrate from 20 mg to 30 mg PO q 8 hr (6AM, 2PM, 10PM)   > On 8/22/2019:    > Increased Labetalol from 400 mg to 600 mg PO q 12 hr (9AM, 9PM)    > Started Spironolactone 50 mg PO BID   > Continue:    > Amlodipine 10 mg PO once daily (9AM)    > Increase Hydralazine from 75 mg to 100 mg PO q 8 hr (6AM, 2PM, 10PM)    > Labetalol 600 mg PO q 12 hr (9AM, 9PM)    > Isosorbide dinitrate 30 mg PO q 8 hr (6AM, 2PM, 10PM)    > Spironolactone 50 mg PO BID    > Hydralazine 25 mg PO TID PRN for SBP greater than 170 mmHg    > Pure hypercholesterolemia   > Lipid profile (8/13/2019) showed , , HDL 42,    > Lipid profile (8/14/2019) showed , , HDL 39, LDL 94   > Continue Atorvastatin 80 mg PO q HS    > Type 2 diabetes mellitus with stage 2 chronic kidney disease   > HbA1c (4/16/2014) = 6.2   > Patient has had chronic kidney disease even before his diagnosis of Type 2 diabetes mellitus and is presumed to be due to prolonged uncontrolled hypertension   > HbA1c (8/13/2019) = 6.6   > Continue Humalog insulin sliding scale SC TID AC    >  ? Primary hyperaldosteronism as etiology of chronic hypokalemia and difficult to control hypertension   > Endocrinology consult (Dr. Zahra Singh) was called at [facility] prior to discharge/transfer to the ARU   > Aldosterone/Renin activity (8/16/2019):    > Aldosterone = 8.8 (NORMAL)    > Renin Activity = 0.300 (NORMAL)    > Aldosterone/Renin Activity = 29 (ELEVATED) ~ EQUIVOCAL, requires confirmation    > Last intake of Losartan was on 8/11/2019 (prior to admission)    > Endocrinology consult (Dr. Dario Barnes):    > ASSESSMENT:     1. Chronic hypokalemia. 2.  Diabetes mellitus. 3.  Diabetic nephropathy with a stage II chronic renal disease. 4.  Normocytic anemia. 5.  Vitamin B12 deficiency. 6.  Borderline iron deficiency. 7.  Hypoalbuminemia.     > DISCUSSION:  The patient may well have hyperaldosteronism, but he may have it on the basis of his nephropathy, which is due to his underlying diabetes. The cause of his anemia might be a combination of B12 deficiency and iron deficiency together, so investigation should be done for this.     > PLAN: Laboratory studies:     1.  24-hour urine for aldosteronism (8/20/2019) = 12.78 (N.V. = 0-19) (NORMAL)     2. Methylmalonic acid level (8/18/2019) = 468 (ELEVATED)     3.  Gastrin level (8/20/2019) = <10 (NORMAL)     4. Microalbumin-to-creatinine ratio. 5.  Transferrin and prealbumin levels. > FSH (8/19/2019) = 5.6 (NORMAL)    > LH (8/18/2019) = 6.8 (NORMAL)    > Free T4 (8/18/2019) = 1.0 (NORMAL)    > TSH (8/18/2019) = 4.63 (ELEVATED)    > Urine aldosterone (8/20/2019): 11.1 ug/L    > Transferrin (8/22/2019) = 275   > Excerpt from the Progress Note (dated 8/22/2019) by Dr. Dario Barnes:    > \"Rainer level is not elevated. 24 hour urine rainer is pending. The aldosterone/renin level is not high. If he has primary aldosteronism, it is very likely to be idiopathic rather an an aldosterone producing adenoma. This is susceptible to medical therapy. Will start spironolactone at 50 mg twice daily. \"    > On 8/22/2019, started Spironolactone 50 mg PO BID   > Unable to proceed with plasma aldosterone confirmatory tests as patient had been started on high-dose Spironolactone; unable to determine presence of adrenal adenoma in the absence of imaging studies   > Continue Spironolactone 50 mg PO BID    > Leukocytosis, resolved   > WBC count (8/15/2019) = 11.7   > No fever documented since admission to the ARU   > WBC count (8/16/2019, on admission) = 17.9   > Work-up:    > Urinalysis (8/16/2019): WNL    > Chest x-ray (PA-Lateral) (8/16/2019) showed a minimally blunted left posterior costophrenic angle could be due to either a tiny effusion or chronic pleural reaction/scarring; mild cardiomegaly; atherosclerosis.    > WBC count (8/18/2019) = 11.6    > Iron deficiency anemia / Vitamin B12 anemia   > Anemia work-up (8/14/2019) showed serum iron 22, TIBC 371, iron % saturation 6, ferritin 34, reticulocyte count 1.4   > Vitamin B12 (8/14/2019) = 208   > Hgb/Hct (8/15/2019) = 10.1/31.3    > Hgb/Hct (8/16/2019, on admission) = 10.7/32.6   > Hgb/Hct (8/18/2019) = 8.7/27.4   > Hgb/Hct (8/19/2019) = 8.4/25.2   > On 8/19/2019, started:     > FeSO4 325 mg PO once daily with breakfast     > Ascorbic Acid 250 mg PO once daily with breakfast (to enhance the absorption of the FeSO4)     > Cyanocobalamin 1,000 mcg PO once daily    > Epoetin tone 10,000 units SC x 1 dose   > Hgb/Hct (8/22/2019) = 8.6/26.3   > On 8/22/2019, Epoetin tone 10,000 units SC x 1 dose     > Hgb/Hct (8/26/2019) = 8.2/25.6   > On 8/26/2019, patient was given Epoetin tone 10,000 units SC x 1 dose     > Continue:    > FeSO4 325 mg PO once daily with breakfast     > Ascorbic Acid 250 mg PO once daily with breakfast (to enhance the absorption of the FeSO4)     > Cyanocobalamin 1,000 mcg PO once daily    > Vitamin D Deficiency   > PTH (8/18/2019) = 101.7   > Vitamin D 25-Hydroxy (8/19/2019) = 16.2   > On 8/19/2019, patient was given Cholecalciferol 50,000 units PO x 1 dose    > On 8/20/2019, started Cholecalciferol 5,000 units PO once daily   > Continue Cholecalciferol 5,000 units PO once daily    > Right ankle swelling, most likely dependent edema due to hemiparesis/lack of muscle contraction and osteoarthritis of the right ankle   > (+) nonpainful swelling of the right ankle for the past few days; denies any trauma   > X-ray of the right ankle (8/22/2019) showed no acute fracture or subluxation; advanced degenerative changes at the tibiotalar joint; old fracture fragment vs. accessory ossicle in the inferior aspect of the lateral malleolus; soft tissue swelling around the ankle.   > Elevate right leg/foot when supine in bed    > Stage 2 chronic kidney disease    > On admission, estimated GFR based on a Creatinine of 1.20 mg/dl:               > Using CKD-EPI = 63.1 mL/min/1.73m2               > Using MDRD = 64.6 mL/min/1.73m2   > Most recent estimated GFR, based on a Creatinine of 1.49 mg/dl on 8/26/2019:    > Using CKD-EPI = 48.6 mL/min/1.73m2    > Using MDRD = 57.4 mL/min/1.73m2    > Urine creatinine (8/26/2019) = 31.00   > Microalbumin, urine (8/26/2019) = 56.00   > Microalbumin/Creatinine ratio (8/26/2019) = 1806   > Nephrology consult (Dr. Claudia Pritchard) for evaluation and comanagement    > Diet:   > On 8/16/2019, solids consistency was advanced from Mechanical soft (NDD 2) to Soft solids (NDD 3)   > Modified barium swallow (8/27/2019) showed silent aspiration of thin liquid; no drew penetration or aspiration with other tested consistencies. > Specifications: Diabetic, low saturated fat   > Solids (consistency): Soft solids (NDD 3)   > Liquids (consistency): Mildly thick (Benavides-thick)       8. Patient's progress in rehabilitation and medical issues discussed with the patient. All questions answered to the best of my ability. Care plan discussed with patient, wife and nurse.       Signed:    Delisa Stuart MD    August 27, 2019

## 2019-08-27 NOTE — PROGRESS NOTES
Problem: Mobility Impaired (Adult and Pediatric)  Goal: *Acute Goals and Plan of Care (Insert Text)  Description  Physical Therapy Short Term Goals  Initiated 2019 and to be accomplished within 14 day(s)  1. Patient will move from supine to sit and sit to supine  in bed with minimal assistance/contact guard assist.     2.  Patient will transfer from bed to chair and chair to bed with minimal/moderate assistance using the least restrictive device. 3.  Patient will perform sit to stand with minimal assistance/moderate assistance. 4.  Patient will ambulate with moderate assistance  for 10 feet with the least restrictive device. Physical Therapy Goals  Initiated 2019 and to be accomplished within 4 weeks  1. Patient will move from supine to sit and sit to supine  in bed with supervision/set-up. 2.  Patient will transfer from bed to chair and chair to bed with supervision/set-up using the least restrictive device. 3.  Patient will perform sit to stand with supervision/set-up. 4.  Patient will ambulate with minimal assistance for 50 feet with the least restrictive device. 5.  Patient will ascend/descend 2 stairs with 1 handrail(s) with moderate assistance . Outcome: Progressing Towards Goal   PHYSICAL THERAPY TREATMENT    Patient: Cordelia Campos (6 y.o. male)  Date: 2019  Diagnosis: Acute ischemic stroke [facility]) [I63.9] Acute ischemic stroke [facility])  Precautions: Aspiration, NWB  Chart, physical therapy assessment, plan of care and goals were reviewed. Time In: 1130  Time Out: 1230  Patient Seen For: Balance activities;Gait training;Patient education; Therapeutic exercise;Transfer training  Pain:  Pt pain was reported as  0 pre-treatment. Pt pain was reported as 0 post-treatment. Intervention: n/a    Patient identified with name and :yes    SUBJECTIVE:     \"I just want to go home. \"    OBJECTIVE DATA SUMMARY:   Objective:     GROSS ASSESSMENT Daily Assessment            BED/MAT MOBILITY Daily Assessment    Supine to Sit : 4 (Contact guard assistance)  Sit to Supine : 4 (Minimal assistance)       TRANSFERS Daily Assessment    Other: stand step without AD  Transfer Assistance : 4 (Minimal assistance)  Sit to Stand Assistance: Contact guard assistance(from 22-23 inch surface; min A from 21 inch surface)    Deep pressure applied to right distal femur during sit to stand which increased patient's independence       GAIT Daily Assessment    Amount of Assistance: 4 (Minimal assistance)  Distance (ft): 36 Feet (ft)(12 feet x2)  Assistive Device: Cane, quad;Gait belt    Pt demonstrated increased control of right knee during stance phase. Without shoes on patient was able to progress his right LE without assistance however required some cues to prevent scissoring       STEPS or STAIRS Daily Assessment            BALANCE Daily Assessment    Sitting - Static: Good (unsupported)  Standing - Static: (fair-)  Standing - Dynamic : Impaired       WHEELCHAIR MOBILITY Daily Assessment            THERAPEUTIC EXERCISES Daily Assessment    Extremity: Both  Exercise Type #1: Supine lower extremity strengthening  Sets Performed: 2  Reps Performed: 15  Level of Assist: Minimal assistance         ASSESSMENT:  Patient continues to progress with independence in transfers and ambulation. Progression toward goals:  ?      Improving appropriately and progressing toward goals  ? Improving slowly and progressing toward goals  ? Not making progress toward goals and plan of care will be adjusted     PLAN:  Patient continues to benefit from skilled intervention to address the above impairments. Continue treatment per established plan of care. Discharge Recommendations:  [facility]  Further Equipment Recommendations for Discharge:  N/A     Estimated LOS: d/c 9/6/19    Activity Tolerance:   fair  Please refer to the flowsheet for vital signs taken during this treatment.     After treatment: Patient left sitting in w/c with wife present      Derek Robbins, PT  8/27/2019

## 2019-08-27 NOTE — ROUTINE PROCESS
SHIFT CHANGE NOTE FOR [facility]    Bedside and Verbal shift change report given to Jacques Cuadra  (oncoming nurse) by Vandana Valdovinos RN (offgoing nurse). Report included the following information SBAR, Kardex, MAR and Recent Results. Situation:   Code Status: Full Code   Reason for Admission: CVA  Hospital Day: 12   Problem List:   Hospital Problems  Date Reviewed: 8/26/2019          Codes Class Noted POA    Primary osteoarthritis of right ankle (Chronic) ICD-10-CM: M19.071  ICD-9-CM: 715.17  8/22/2019 Yes    Overview Signed 8/23/2019 11:54 AM by Yuriy Kim MD     X-ray of the right ankle (8/22/2019) showed no acute fracture or subluxation; advanced degenerative changes at the tibiotalar joint; old fracture fragment vs. accessory ossicle in the inferior aspect of the lateral malleolus; soft tissue swelling around the ankle.              Vitamin D deficiency (Chronic) ICD-10-CM: E55.9  ICD-9-CM: 268.9  8/19/2019 Yes    Overview Signed 8/19/2019  1:34 PM by Yuriy Kim MD     Vitamin D 25-Hydroxy (8/19/2019) = 16.2              Leukocytosis ICD-10-CM: X28.488  ICD-9-CM: 288.60  8/16/2019 Yes    Overview Signed 8/16/2019 11:19 AM by Yuriy Kim MD     WBC count (8/16/2019) = 17.9             Hypertensive heart and kidney disease without heart failure and with stage 2 chronic kidney disease (Chronic) ICD-10-CM: I13.10, N18.2  ICD-9-CM: 404.90, 585.2  Unknown Yes        Chronic hypokalemia ICD-10-CM: E87.6  ICD-9-CM: 276.8  Unknown Yes    Overview Addendum 8/15/2019 11:31 AM by Yuriy Kim MD     Persistent chronic hypokalemia + hypertension; ?primary hyperaldosteronism             Type 2 diabetes mellitus with stage 2 chronic kidney disease ([facility] [address] (Chronic) ICD-10-CM: E11.22, N18.2  ICD-9-CM: 250.40, 585.2  Unknown Yes    Overview Signed 8/15/2019 12:26 PM by Yuriy Kim MD     HbA1c (8/13/2019) = 6.6             Pure hypercholesterolemia (Chronic) ICD-10-CM: E78.00  ICD-9-CM: 272.0 8/15/2019 Yes    Overview Addendum 8/15/2019  6:27 PM by Benito Jones MD     Lipid profile (8/13/2019) showed , , HDL 42, ; Lipid profile (8/14/2019) showed , , HDL 39, LDL 94             Current use of aspirin ICD-10-CM: Z79.82  ICD-9-CM: V58.66  8/14/2019 Yes        On statin therapy due to risk of future cardiovascular event ICD-10-CM: Z79.899  ICD-9-CM: V58.69  8/14/2019 Yes    Overview Signed 8/15/2019 12:21 PM by Benito Jones MD     On Atorvastatin             Vitamin B12 deficiency anemia (Chronic) ICD-10-CM: D51.9  ICD-9-CM: 281.1  8/14/2019 Yes    Overview Signed 8/19/2019  1:24 PM by Benito Jones MD     Vitamin B12 (8/14/2019) = 208             Iron deficiency anemia (Chronic) ICD-10-CM: D50.9  ICD-9-CM: 280.9  8/14/2019 Yes    Overview Signed 8/19/2019  1:25 PM by Benito Jones MD     Anemia work-up (8/14/2019) showed serum iron 22, TIBC 371, iron % saturation 6, ferritin 34, reticulocyte count 1.4               Refusal of statin medication by patient ICD-10-CM: Z53.29  ICD-9-CM: V64.2  8/13/2019 Yes    Overview Signed 8/15/2019  2:01 PM by Benito Jones MD     As per note of Neurology (Dr. Nathan Villasenor), patient refuses statin agent because of potential side effects.               * (Principal) Acute ischemic stroke [facility]) ICD-10-CM: I63.9  ICD-9-CM: 434.91  8/12/2019 Yes    Overview Signed 8/15/2019 12:17 AM by Benito Jones MD     Acute Ischemic Stroke (acute/early subacute infarct at the left posterior basal ganglia to corona radiata) with residual right hemiparesis, dysphagia and dysarthria             Impaired mobility and ADLs ICD-10-CM: Z74.09  ICD-9-CM: 799.89  8/12/2019 Yes        Received intravenous tissue plasminogen activator (tPA) in emergency department ICD-10-CM: Z92.82  ICD-9-CM: V45.88  8/12/2019 Yes        Right hemiparesis ([facility] [address] ICD-10-CM: G81.91  ICD-9-CM: 342.90  8/12/2019 Yes        Dysphagia ICD-10-CM: R13.10  ICD-9-CM: 787.20 8/12/2019 Yes        Dysarthria ICD-10-CM: R47.1  ICD-9-CM: 784.51  8/12/2019 Yes              Background:   Past Medical History:   Past Medical History:   Diagnosis Date    Acute ischemic stroke ([facility] [address] 8/12/2019    Acute Ischemic Stroke (acute/early subacute infarct at the left posterior basal ganglia to corona radiata) with residual right hemiparesis, dysphagia and dysarthria    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     On Allopurinol    Chronic hypokalemia     Persistent chronic hypokalemia + hypertension; ?primary hyperaldosteronism    CKD (chronic kidney disease) stage 2, GFR 60-89 ml/min 8/15/2019    Current use of aspirin 8/14/2019    Dysarthria 8/12/2019    Dysphagia 8/12/2019    Elevated prostate specific antigen (PSA) 7/21/2011    First degree atrioventricular block by electrocardiogram 8/12/2019    Hypertensive heart and kidney disease without heart failure and with stage 2 chronic kidney disease     Iron deficiency anemia 8/14/2019    Anemia work-up (8/14/2019) showed serum iron 22, TIBC 371, iron % saturation 6, ferritin 34, reticulocyte count 1.4     Obesity, Class I, BMI 30-34.9     Obstructive sleep apnea on CPAP     On statin therapy due to risk of future cardiovascular event 8/14/2019    On Atorvastatin    Primary osteoarthritis of right ankle 8/22/2019    X-ray of the right ankle (8/22/2019) showed no acute fracture or subluxation; advanced degenerative changes at the tibiotalar joint; old fracture fragment vs. accessory ossicle in the inferior aspect of the lateral malleolus; soft tissue swelling around the ankle.     Pure hypercholesterolemia 08/15/2019    Lipid profile (8/13/2019) showed , , HDL 42, ; Lipid profile (8/14/2019) showed , , HDL 39, LDL 94    Received intravenous tissue plasminogen activator (tPA) in emergency department 8/12/2019    Refusal of statin medication by patient 8/13/2019    As per note of Neurology (Dr. Anna Quan), patient refuses statin agent because of potential side effects.  Right hemiparesis ([address] 8/12/2019    Type 2 diabetes mellitus with stage 2 chronic kidney disease (HCC)     HbA1c (8/13/2019) = 6.6    Vitamin B12 deficiency anemia 8/14/2019    Vitamin B12 (8/14/2019) = 208    Vitamin D deficiency 8/19/2019    Vitamin D 25-Hydroxy (8/19/2019) = 16.2       Patient taking anticoagulants yes Heparin, ASA   Patient has a defibrillator: no     Assessment:   Changes in Assessment throughout shift: No     Patient has central line: no Reasons if yes: Insertion date: Last dressing date:   Patient has Boykin Cath: no Reasons if yes:     Insertion date:     Last Vitals:     Vitals:    08/26/19 1430 08/26/19 2100 08/27/19 0555 08/27/19 0719   BP: 140/64 183/89 169/70 147/69   Pulse: 61 66 61 (!) 58   Resp: 18 18  18   Temp: 98.4 °F (36.9 °C) 97 °F (36.1 °C)  98.1 °F (36.7 °C)   SpO2: 98% 98%  98%   Weight:       Height:            PAIN    Pain Assessment    Pain Intensity 1: 0 (08/27/19 0650) Pain Intensity 1: 2 (12/29/14 1105)    Pain Location 1: Shoulder Pain Location 1: Abdomen    Pain Intervention(s) 1: Medication (see MAR) Pain Intervention(s) 1: Medication (see MAR)  Patient Stated Pain Goal: 0 Patient Stated Pain Goal: 0  o Intervention effective: no    o Other actions taken for pain:      Skin Assessment  Skin color Skin Color: Appropriate for ethnicity  Condition/Temperature Skin Condition/Temp: Dry, Warm  Integrity Skin Integrity: Intact, Tear  Turgor Turgor: Epidermis thin w/ loss of subcut tissue  Weekly Pressure Ulcer Documentation  Pressure  Injury Documentation: No Pressure Injury Noted-Pressure Ulcer Prevention Initiated  Wound Prevention & Protection Methods  Orientation of wound Orientation of Wound Prevention: Posterior  Location of Prevention Location of Wound Prevention: Sacrum/Coccyx  Dressing Present Dressing Present : Intact, not due to be changed  Dressing Status Dressing Status: Intact  Wound Offloading Wound Offloading (Prevention Methods): Bed, pressure redistribution/air     INTAKE/OUPUT  Date 08/26/19 0700 - 08/27/19 0659 08/27/19 0700 - 08/28/19 0659   Shift 4638-1346 9742-6368 24 Hour Total 6552-8239 0720-2535 24 Hour Total   INTAKE   P.O. 240  240        P. O. 240  240      Shift Total(mL/kg) 240(2.4)  240(2.4)      OUTPUT   Urine(mL/kg/hr)  2400(2) 2400(1)        Urine Voided  2400 2400        Urine Occurrence(s) 4 x 6 x 10 x      Stool           Stool Occurrence(s) 2 x 0 x 2 x      Shift Total(mL/kg)  5369(28.2) 2400(23.5)       -2400 -2160      Weight (kg) 102 102 102 102 102 102       Recommendations:  1. Patient needs and requests:     2. Diet: Cardiac Mech Soft Nectar Thick     3. Pending tests/procedures:      4. Functional Level/Equipment:     5. Estimated Discharge Date: 8/24/19 Posted on Whiteboard in Patients Room: yes     HEALS Safety Check    A safety check occurred in the patient's room between off going nurse and oncoming nurse listed above. The safety check included the below items  Area Items   H  High Alert Medications - Verify all high alert medication drips (heparin, PCA, etc.)   E  Equipment - Suction is set up for ALL patients (with isaccker)  - Red plugs utilized for all equipment (IV pumps, etc.)  - WOWs wiped down at end of shift.  - Room stocked with oxygen, suction, and other unit-specific supplies   A  Alarms - Bed alarm is set for fall risk patients  - Ensure chair alarm is in place and activated if patient is up in a chair   L  Lines - Check IV for any infiltration  - Boykin bag is empty if patient has a Boykin   - Tubing and IV bags are labeled   S  Safety   - Room is clean, patient is clean, and equipment is clean. - Hallways are clear from equipment besides carts.    - Fall bracelet on for fall risk patients  - Ensure room is clear and free of clutter  - Suction is set up for ALL patients (with yanker)  - Hallways are clear from equipment besides carts.    - Isolation precautions followed, supplies available outside room, sign posted

## 2019-08-28 PROBLEM — N25.81 SECONDARY HYPERPARATHYROIDISM OF RENAL ORIGIN (HCC): Chronic | Status: ACTIVE | Noted: 2019-08-18

## 2019-08-28 LAB
ANION GAP SERPL CALC-SCNC: 8 MMOL/L (ref 3–18)
BUN SERPL-MCNC: 37 MG/DL (ref 7–18)
BUN/CREAT SERPL: 22 (ref 12–20)
CALCIUM SERPL-MCNC: 8.3 MG/DL (ref 8.5–10.1)
CHLORIDE SERPL-SCNC: 108 MMOL/L (ref 100–111)
CO2 SERPL-SCNC: 24 MMOL/L (ref 21–32)
CREAT SERPL-MCNC: 1.69 MG/DL (ref 0.6–1.3)
GLUCOSE BLD STRIP.AUTO-MCNC: 104 MG/DL (ref 70–110)
GLUCOSE BLD STRIP.AUTO-MCNC: 108 MG/DL (ref 70–110)
GLUCOSE BLD STRIP.AUTO-MCNC: 119 MG/DL (ref 70–110)
GLUCOSE BLD STRIP.AUTO-MCNC: 98 MG/DL (ref 70–110)
GLUCOSE SERPL-MCNC: 88 MG/DL (ref 74–99)
MAGNESIUM SERPL-MCNC: 2.3 MG/DL (ref 1.6–2.6)
PHOSPHATE SERPL-MCNC: 5.4 MG/DL (ref 2.5–4.9)
POTASSIUM SERPL-SCNC: 4.6 MMOL/L (ref 3.5–5.5)
SODIUM SERPL-SCNC: 140 MMOL/L (ref 136–145)

## 2019-08-28 PROCEDURE — 97530 THERAPEUTIC ACTIVITIES: CPT

## 2019-08-28 PROCEDURE — 92526 ORAL FUNCTION THERAPY: CPT

## 2019-08-28 PROCEDURE — 97110 THERAPEUTIC EXERCISES: CPT

## 2019-08-28 PROCEDURE — 83735 ASSAY OF MAGNESIUM: CPT

## 2019-08-28 PROCEDURE — 74011250637 HC RX REV CODE- 250/637: Performed by: INTERNAL MEDICINE

## 2019-08-28 PROCEDURE — 84100 ASSAY OF PHOSPHORUS: CPT

## 2019-08-28 PROCEDURE — 74011250636 HC RX REV CODE- 250/636: Performed by: INTERNAL MEDICINE

## 2019-08-28 PROCEDURE — 65310000000 HC RM PRIVATE REHAB

## 2019-08-28 PROCEDURE — 36415 COLL VENOUS BLD VENIPUNCTURE: CPT

## 2019-08-28 PROCEDURE — 97112 NEUROMUSCULAR REEDUCATION: CPT

## 2019-08-28 PROCEDURE — 97116 GAIT TRAINING THERAPY: CPT

## 2019-08-28 PROCEDURE — 80048 BASIC METABOLIC PNL TOTAL CA: CPT

## 2019-08-28 PROCEDURE — 74011000258 HC RX REV CODE- 258: Performed by: INTERNAL MEDICINE

## 2019-08-28 PROCEDURE — 82962 GLUCOSE BLOOD TEST: CPT

## 2019-08-28 PROCEDURE — 92507 TX SP LANG VOICE COMM INDIV: CPT

## 2019-08-28 RX ADMIN — HYDRALAZINE HYDROCHLORIDE 75 MG: 50 TABLET, FILM COATED ORAL at 15:21

## 2019-08-28 RX ADMIN — SPIRONOLACTONE 50 MG: 25 TABLET ORAL at 08:57

## 2019-08-28 RX ADMIN — Medication 5000 UNITS: at 08:57

## 2019-08-28 RX ADMIN — HYDRALAZINE HYDROCHLORIDE 75 MG: 50 TABLET, FILM COATED ORAL at 22:02

## 2019-08-28 RX ADMIN — HYDRALAZINE HYDROCHLORIDE 100 MG: 50 TABLET ORAL at 06:30

## 2019-08-28 RX ADMIN — ISOSORBIDE DINITRATE 30 MG: 20 TABLET ORAL at 22:02

## 2019-08-28 RX ADMIN — Medication 250 MG: at 08:57

## 2019-08-28 RX ADMIN — HEPARIN SODIUM 5000 UNITS: 5000 INJECTION INTRAVENOUS; SUBCUTANEOUS at 15:19

## 2019-08-28 RX ADMIN — Medication 400 MG: at 08:57

## 2019-08-28 RX ADMIN — BACLOFEN 10 MG: 10 TABLET ORAL at 12:44

## 2019-08-28 RX ADMIN — FERROUS SULFATE TAB 325 MG (65 MG ELEMENTAL FE) 325 MG: 325 (65 FE) TAB at 08:57

## 2019-08-28 RX ADMIN — BACLOFEN 10 MG: 10 TABLET ORAL at 17:17

## 2019-08-28 RX ADMIN — ISOSORBIDE DINITRATE 30 MG: 20 TABLET ORAL at 15:21

## 2019-08-28 RX ADMIN — LABETALOL HCL 600 MG: 200 TABLET, FILM COATED ORAL at 22:02

## 2019-08-28 RX ADMIN — BACLOFEN 10 MG: 10 TABLET ORAL at 06:00

## 2019-08-28 RX ADMIN — ASPIRIN 81 MG 81 MG: 81 TABLET ORAL at 08:57

## 2019-08-28 RX ADMIN — ISOSORBIDE DINITRATE 30 MG: 20 TABLET ORAL at 06:00

## 2019-08-28 RX ADMIN — HEPARIN SODIUM 5000 UNITS: 5000 INJECTION INTRAVENOUS; SUBCUTANEOUS at 22:04

## 2019-08-28 RX ADMIN — ATORVASTATIN CALCIUM 80 MG: 40 TABLET, FILM COATED ORAL at 22:02

## 2019-08-28 RX ADMIN — HEPARIN SODIUM 5000 UNITS: 5000 INJECTION INTRAVENOUS; SUBCUTANEOUS at 06:00

## 2019-08-28 RX ADMIN — SPIRONOLACTONE 50 MG: 25 TABLET ORAL at 22:07

## 2019-08-28 RX ADMIN — CYANOCOBALAMIN TAB 1000 MCG 1000 MCG: 1000 TAB at 08:57

## 2019-08-28 RX ADMIN — IRON SUCROSE 200 MG: 20 INJECTION, SOLUTION INTRAVENOUS at 18:42

## 2019-08-28 NOTE — PROGRESS NOTES
Problem: Self Care Deficits Care Plan (Adult)  Goal: *Therapy Goal (Edit Goal, Insert Text)  Description  Occupational Therapy Goals   Long Term Goals  Initiated 2019 and to be accomplished within 4 week(s) (19)  1. Pt will perform self-feeding with MI.  2. Pt will perform grooming with Set-up. 3. Pt will perform UB bathing with SBA. 4. Pt will perform LB bathing with Luba/CGA. 5. Pt will perform tub/shower transfer with Luba/CGA using LRAD. 6. Pt will perform UB dressing with Set-up. 7. Pt will perform LB dressing with Luba/CGA. 8. Pt will perform toileting task with Luba/CGA. 9. Pt will perform toilet transfer with Luba/CGA using LRAD. Short Term Goals   Initiated 2019 and to be accomplished within 7 day(s) (Updated , to be met by 19)  1. Pt will perform self-feeding with S; using compensatory strategies for right hand incorporation into set-up. (GM)  2. Pt will perform simple grooming task, including denture mgmt, with Luba and compensatory strategies as needed. (GM; washing face- pt reporting spouse previously assisted with denture mgmt)  3. Pt will perform UB bathing with Luba using AE as needed. (GM)  4. Pt will perform LB bathing with ModA using AE and compensatory strategies as needed. (GM)  5. Pt will perform tub/shower transfer with modA x 1 <>TTB. (GM)  6. Pt will perform UB dressing with CGA. (Cont for consistency)  7. Pt will perform LB dressing with ModA using AE as needed. (GM)  8. Pt will perform toileting task with ModA. (GM)  9. Pt will perform toilet transfer with ModA x 1 <>3:1 commode. (GM)      Outcome: Progressing Towards Goal   OCCUPATIONAL THERAPY TREATMENT    Patient: Jorge Bruce   71 y.o.     Patient identified with name and : Yes     Date: 2019    First Tx Session  Time In: 1330  Time Out: 1430    Diagnosis: Acute ischemic stroke [facility]) [I63.9]   Precautions: Aspiration, NWB  Chart, occupational therapy assessment, plan of care, and goals were reviewed. Pain:  Pt reports 0/10 pain or discomfort prior to treatment. Pt reports 0/10 pain or discomfort post treatment. Intervention Provided: N/A     SUBJECTIVE:   Patient stated I went before we came here.  (re: using the restroom)    OBJECTIVE DATA SUMMARY:     Pt sitting up in w/c for session. Pt participated in transfer w/c > mat table in prep for therex/NMRE given Min A for STS and stand-step transfer. He appeared to enjoy playing Connect Four with his wife. THERAPEUTIC EXERCISE Daily Assessment   AAROM/PROM of right UE to increase ROM/neuroplasticity in prep for functional use/weight bearing  Seated edge of mat, pt participated in [facility] of right UE while completing 10x2 reps shoulder elevation/depression and scapula retraction/protraction using mirror as visual cue and mod vcs for proper form. Pt then participated in PROM of right UE 10x2 reps each: shoulder abduction/adduction, shoulder flexion/extension (min ROM to avoid injury to right shoulder), elbow flexion/extension (SROM on table top), forearm supination/pronation, wrist flexion/extension, and digital flexion/extension. NMRE Daily Assessment   Weight bearing through right UE to increase NMRE/neuroplasticity in prep for functional use/ADLs during functional reach for game play outside [facility] with left UE Seated edge of mat, pt participated in weight bearing through right UE with hand on mat table given Min A for stability of right wrist/shoulder. During [address], pt participated in functional reach outside [facility] using left UE, to facilitate NMRE of right UE, to complete x2 games of Connect Four. Pt played against his wife and appeared to enjoy game.       MOBILITY/TRANSFERS Daily Assessment   Functional transfer in prep for ADLs   Stand-step transfer without AD given Min A for STS from w/c > mat table and min cues for proper stepping fully back to surface prior to sitting (right LE)       ASSESSMENT:  Pt continues to progress with functional transfers. Noted no new movement to right UE, however pt continues to remain motivated and wife reports assisting pt with SROM and sensory stimulation to right UE. Progression toward goals:  ?x         Improving appropriately and progressing toward goals  ? Improving slowly and progressing toward goals  ? Not making progress toward goals and plan of care will be adjusted     PLAN:  Patient continues to benefit from skilled intervention to address the above impairments. Continue treatment per established plan of care. Discharge Recommendations:  [facility]  Further Equipment Recommendations for Discharge:  Bariatric 3:1 commode      Activity Tolerance:  Fair; min rest breaks provided     Estimated LOS: 9/4/19    Please refer to the flowsheet for vital signs taken during this treatment. After treatment:   ?  Patient left in no apparent distress sitting up edge of mat for PT session   ? Patient left in no apparent distress in bed  ? Call bell left within reach  ? Nursing notified  ? Caregiver present  ? Bed alarm activated    COMMUNICATION/EDUCATION:   ? Home safety education was provided and the patient/caregiver indicated understanding. ? Patient/family have participated as able in goal setting and plan of care. ? Patient/family agree to work toward stated goals and plan of care. ? Patient understands intent and goals of therapy, but is neutral about his/her participation. ? Patient is unable to participate in goal setting and plan of care.       JUSTIN Mendoza/ROVERTO

## 2019-08-28 NOTE — PROGRESS NOTES
Progress Note  Subjective     Tolerated venofer well         69y M with DM, HTN, CKD, admitted to rehab after stroke, seen for CKD  Impression & Plan:   IMPRESSION:   Chronic kidney disease stage III with heavy proteinuria, diabetic nephropathy  Proteinuria, diabetes related, not on any antiproteinuric medication   Hypertension, well controlled  Diabetes  Recent acute ischemic stroke  Chronic hypokalemia, on spiranolactone  Anemia  Secondary hyperparathyroidism   PLAN:   Increase venofer to 200mg , monitor Hb  DC oral iron. Monitor renal function for now, will add ACE inhibitor for protienuria in near future. Adjust medication for current renal function status.                 Current Facility-Administered Medications   Medication Dose Route Frequency    hydrALAZINE (APRESOLINE) tablet 75 mg  75 mg Oral Q8H    iron sucrose (VENOFER) 50 mg in 0.9% sodium chloride 100 mL IVPB  50 mg IntraVENous Q24H    baclofen (LIORESAL) tablet 10 mg  10 mg Oral TID    labetalol (NORMODYNE) tablet 600 mg  600 mg Oral Q12H    spironolactone (ALDACTONE) tablet 50 mg  50 mg Oral BID    ferrous sulfate tablet 325 mg  1 Tab Oral DAILY WITH BREAKFAST    ascorbic acid (vitamin C) (VITAMIN C) tablet 250 mg  250 mg Oral DAILY WITH BREAKFAST    cyanocobalamin tablet 1,000 mcg  1,000 mcg Oral DAILY    cholecalciferol (VITAMIN D3) capsule 5,000 Units  5,000 Units Oral DAILY    isosorbide dinitrate (ISORDIL) tablet 30 mg  30 mg Oral Q8H    hydrALAZINE (APRESOLINE) tablet 25 mg  25 mg Oral TID PRN    acetaminophen (TYLENOL) tablet 650 mg  650 mg Oral Q4H PRN    bisacodyl (DULCOLAX) tablet 10 mg  10 mg Oral Q48H PRN    heparin (porcine) injection 5,000 Units  5,000 Units SubCUTAneous Q8H    insulin lispro (HUMALOG) injection   SubCUTAneous TIDAC    aspirin chewable tablet 81 mg  81 mg Oral DAILY WITH BREAKFAST    amLODIPine (NORVASC) tablet 10 mg  10 mg Oral DAILY    atorvastatin (LIPITOR) tablet 80 mg  80 mg Oral QHS Review of Systems:     As above   Data Review:    Labs: Results:       Chemistry Recent Labs     08/28/19  0609 08/26/19  0608   GLU 88 89    139   K 4.6 4.4    110   CO2 24 26   BUN 37* 30*   CREA 1.69* 1.49*   CA 8.3* 8.6   AGAP 8 3   BUCR 22* 20      CBC w/Diff Recent Labs     08/26/19  0608   HGB 8.2*   HCT 25.6*         Coagulation No results for input(s): PTP, INR, APTT in the last 72 hours. No lab exists for component: INREXT, INREXT    Iron/Ferritin No results for input(s): IRON in the last 72 hours. No lab exists for component: TIBCCALC   BNP No results for input(s): BNPP in the last 72 hours. Cardiac Enzymes No results for input(s): CPK, CKND1, DANY in the last 72 hours. No lab exists for component: CKRMB, TROIP   Liver Enzymes No results for input(s): TP, ALB, TBIL, AP, SGOT, GPT in the last 72 hours.     No lab exists for component: DBIL   Thyroid Studies Lab Results   Component Value Date/Time    TSH 4.63 (H) 08/19/2019 06:55 AM         EKG: sinus     Physical Assessment:     Visit Vitals  /64   Pulse 64   Temp 98 °F (36.7 °C)   Resp 18   Ht 5' 9\" (1.753 m)   Wt 102 kg (224 lb 14.4 oz)   SpO2 99%   BMI 33.21 kg/m²     Weight change:     Intake/Output Summary (Last 24 hours) at 8/28/2019 1328  Last data filed at 8/27/2019 1910  Gross per 24 hour   Intake 282.5 ml   Output --   Net 282.5 ml     Physical Exam:   General: comfortable, no acute distress   HEENT sclera anicteric, supple neck, no thyromegaly  CVS: S1S2 heard,  no rub  RS: + air entry b/l,   Abd: Soft, Non tender, Not distended,   Neuro: non focal, awake, alert , CN II-XII are grossly intact  Extrm: + edema, no cyanosis, clubbing   Skin: no visible  Rash  Musculoskeletal: No gross joints or bone deformities     Procedures/imaging: see electronic medical records for all procedures, Xrays and details which were not copied into this note but were reviewed prior to creation of [address] Ranjeet Sousa, MD  August 28, 2019  [facility]  Office [phone]

## 2019-08-28 NOTE — PROGRESS NOTES
Problem: Dysphagia (Adult)  Goal: *Speech Goal: (INSERT TEXT)  Description  Long term goals  Patient will:  1. Tolerate regular diet with thin liquids using safe swallowing techniques without s/s of aspiration in 5/6 meals. 2. Use safe swallowing techniques (including slowed rate, small bites/sips, alternate liquids/solids, effortful swallow, head rotation to right for liquids) with supervision. 3. Participate in MBS study prior to advancing to thin liquids to assure safety (no aspiration). 4. Perform oral and pharyngeal strengthening exercises (to improve speech and swallowing) with supervision-modified independence. 5. Demonstrate 90% intelligibility in conversation using appropriate speaking strategies (slowed rate, overarticulation, increased volume). 6. Recall 3 words after 5 minutes with supervision. 7. Perform functional problem solving/reasoning tasks with 90% accuracy. 8. Name 8-10 items in categories of increasing abstraction, supervision. Short term goals (by 8/30/19)  Patient will:   1. Tolerate soft diet with nectar thick liquids using safe swallowing techniques without s/s of aspiration in 5/6 meals. 2. Use safe swallowing techniques (including slowed rate, small bites/sips, alternate liquids/solids, effortful swallow, head rotation to right for liquids) with min cues. 3. Tolerate small amounts of ice chips and water with the SLP using head rotation to the right and/or chin tuck, without overt s/s of aspiration. 4. Perform oral and pharyngeal strengthening exercises (to improve speech and swallowing) with min assist.  5. Demonstrate 90% intelligibility in polysyllabic words and sentences using appropriate speaking strategies (slowed rate, overarticulation, increased volume). 6. Recall 3 words after 5 minutes with mod assist.  7. Perform functional problem solving/reasoning tasks with 75-85% accuracy. 8. Name 8-10 items in concrete categories, supervision.       Note:   SPEECH LANGUAGE PATHOLOGY TREATMENT    Patient: Frederick Santiago (42 y.o. male)  Date: 2019  Diagnosis: Acute ischemic stroke [facility]) [I63.9] Acute ischemic stroke [facility])       Time in: 0840  Time Out:  5139  SUBJECTIVE:   Patient stated I'm practicing those exercises. OBJECTIVE:   Mental Status:  Mr. Rudolph Obando was awake and alert in treatment sessions today. He continues to be impulsive while eating. Treatment & Interventions:   Patient was seen in the dining room at mealtime for breakfast and lunch today. He continues to receive an advanced soft diet with nectar thick liquids. Patient needs consistent (moderate) cues to slow his rate of intake and take small boluses. No overt s/s of aspiration were noted, but Mr. Rudolph Obando continues to be at risk due to his eating habits and noted silent aspiration of liquids. Mr. Rudolph Obando was also seen for a thirty minute speech therapy session this morning. The following treatment tasks were presented: Motor Speech:   Production of strong /k/: 90% accuracy  Small boluses ice chips: Well tolerated with head rotation to R  Small boluses water (5cc): No overt s/s of aspiration with cues to rotate head R  Independent sips:  Throat clearing 40% of the time    Neuro-Linguistics:   Orientation:   Supervision  Recent memory:  Supervision  Problem solvin% accuracy responses    Response & Tolerance to Activities:  Mr. Rudolph Obando continues to need significant cuing for swallowing safety. MBS yesterday revealed continued aspiration during/after the swallow of thin liquids with out throat clearing or cough. Pain:  Pain Scale 1: Numeric (0 - 10)  No report of pain     After treatment:   ?       Patient left in no apparent distress sitting up in chair  ? Patient left in no apparent distress in bed  ? Call bell left within reach  ? Nursing notified  ? Caregiver present  ?        Bed alarm activated    ASSESSMENT:   Progression toward goals:  ?       Improving appropriately and progressing toward goals  ? Improving slowly and progressing toward goals  ? Not making progress toward goals and plan of care will be adjusted    PLAN:   Patient continues to benefit from skilled intervention to address the above impairments. Continue treatment per established plan of care.   Discharge Recommendations:  [facility]    Estimated LOS: Through 9/6/19    SILVERIO Rosado  Time Calculation:  90 Minutes

## 2019-08-28 NOTE — PROGRESS NOTES
Problem: Mobility Impaired (Adult and Pediatric)  Goal: *Acute Goals and Plan of Care (Insert Text)  Description  Physical Therapy Short Term Goals  Initiated 2019 and to be accomplished within 14 day(s)  1. Patient will move from supine to sit and sit to supine  in bed with minimal assistance/contact guard assist.     2.  Patient will transfer from bed to chair and chair to bed with minimal/moderate assistance using the least restrictive device. 3.  Patient will perform sit to stand with minimal assistance/moderate assistance. 4.  Patient will ambulate with moderate assistance  for 10 feet with the least restrictive device. Physical Therapy Goals  Initiated 2019 and to be accomplished within 4 weeks  1. Patient will move from supine to sit and sit to supine  in bed with supervision/set-up. 2.  Patient will transfer from bed to chair and chair to bed with supervision/set-up using the least restrictive device. 3.  Patient will perform sit to stand with supervision/set-up. 4.  Patient will ambulate with minimal assistance for 50 feet with the least restrictive device. 5.  Patient will ascend/descend 2 stairs with 1 handrail(s) with moderate assistance . Outcome: Progressing Towards Goal   PHYSICAL THERAPY TREATMENT    Patient: Gen Luna (47 y.o. male)  Date: 2019  Diagnosis: Acute ischemic stroke [facility]) [I63.9] Acute ischemic stroke [facility])  Precautions: Aspiration, NWB  Chart, physical therapy assessment, plan of care and goals were reviewed. Time In: 1130  Time Out: 1230  Time In: 1435  Time Out: 1505  Patient Seen For: Gait training;Transfer training; Therapeutic exercise  Pain:  Pt pain was reported as  0 pre-treatment. Pt pain was reported as 0 post-treatment. Intervention: n/a    Patient identified with name and :yes    SUBJECTIVE:     \"You wore me out. \"    OBJECTIVE DATA SUMMARY:   Objective:     GROSS ASSESSMENT Daily Assessment            BED/MAT MOBILITY Daily Assessment    Supine to Sit : 4 (Minimal assistance)  Sit to Supine : 5 (Supervision)       TRANSFERS Daily Assessment    Other: stand step without assistive device  Transfer Assistance : 4 (Minimal assistance)(from mat to w/c)  Sit to Stand Assistance: Moderate assistance(to min A)    Multiple sit to stand transfers performed from 23 inch height mat with patient holding a ball between his knees to increase R LE muscle contraction during transfer. Pt is able to perform sit to stand from this height with CGA. Pt performed sit to  a similar manner from his w/c (20 inch height) with min to mod A as fatigue set in. GAIT Daily Assessment    Amount of Assistance: 4 (Minimal assistance)  Distance (ft): 42 Feet (ft)  Assistive Device: Cane, quad  Pt also ambulated 15 feet and 8 feet with QC and min A  Pt required verbal cues to prevent hyperextension on the right LE however did better with controlling hyperextension and preventing buckling with weight bearing. Pt did not require assistance to prevent scissoring when stepping with right LE.       STEPS or STAIRS Daily Assessment            BALANCE Daily Assessment    Sitting - Static: Good (unsupported)  Standing - Static: (fair-)    Standing without UE support and reaching for cones positioned anteriorly and to his right and then placing him on his left to facilitate weight shifting between LEs. Pt fluctuated from CGA to mod A to maintain balance during this activity with tactile cues for proper muscle contractions and weight shifting.        WHEELCHAIR MOBILITY Daily Assessment            THERAPEUTIC EXERCISES Daily Assessment    Extremity: Right  Exercise Type #1: Seated lower extremity strengthening  Sets Performed: 2  Reps Performed: 10  Level of Assist: Contact guard assistance(tactile cues to stimulate muscle contraction)  Exercise Type #2: Supine lower extremity strengthening  Sets Performed: 2  Reps Performed: 10  Level of Assist: Contact guard assistance         ASSESSMENT:  Pt continues to progress with quality of movement during gait training. Progression toward goals:  ?      Improving appropriately and progressing toward goals  ? Improving slowly and progressing toward goals  ? Not making progress toward goals and plan of care will be adjusted     PLAN:  Patient continues to benefit from skilled intervention to address the above impairments. Continue treatment per established plan of care. Discharge Recommendations:  [facility]  Further Equipment Recommendations for Discharge:  N/A     Estimated LOS: d/c 9/6/19    Activity Tolerance:   fair  Please refer to the flowsheet for vital signs taken during this treatment.     After treatment:   Patient left sitting in w/c with wife present      Isabelle Vivi, PT  8/28/2019

## 2019-08-28 NOTE — PROGRESS NOTES
[facility]  [address] [place] [address], [address]     INPATIENT REHABILITATION  DAILY PROGRESS NOTE     Date: 8/28/2019    Name: Frederick Santiaog Age / Sex: 71 y.o. / male   CSN: [id number] MRN: [id number]   [facility] Date: 8/15/2019 Length of Stay: 13 days     Primary Rehab Diagnosis: Impaired Mobility and ADLs secondary to Acute Ischemic Stroke (acute/early subacute infarct at the left posterior basal ganglia to corona radiata) with residual right hemiparesis, dysphagia and dysarthria      Subjective:     Patient seen and examined. Blood pressure fairly controlled. Blood glucose controlled. Team conference was held at bedside this PM.     Patient's Complaint:   No significant medical complaints    Pain Control: no current joint or muscle symptoms, essentially pain-free      Objective:     Vital Signs:  Patient Vitals for the past 24 hrs:   BP Temp Pulse Resp SpO2   08/28/19 0813 117/64 98 °F (36.7 °C) 64 18 99 %   08/28/19 0628 153/68 -- 60 -- --   08/28/19 0600 153/68 -- 60 -- --   08/27/19 2200 174/71 98.5 °F (36.9 °C) 73 17 98 %   08/27/19 1520 169/73 98.1 °F (36.7 °C) (!) 56 18 97 %        Physical Examination:  GENERAL SURVEY: Patient is awake, alert, oriented x 3, sitting comfortably on the chair, not in acute respiratory distress. HEENT: pink palpebral conjunctivae, anicteric sclerae, no nasoaural discharge, moist oral mucosa  NECK: supple, no jugular venous distention, no palpable lymph nodes  CHEST/LUNGS: symmetrical chest expansion, good air entry, clear breath sounds  HEART: adynamic precordium, good S1 S2, no S3, regular rhythm, no murmurs  ABDOMEN: obese, bowel sounds appreciated, soft, non-tender  EXTREMITIES: pink nailbeds, no edema except for right ankle swelling, full and equal pulses, no calf tenderness   NEUROLOGICAL EXAM: The patient is awake, alert and oriented x3, able to answer questions fairly appropriately, able to follow 1 and 2 step commands.   Able to tell time from the wall clock. Cranial nerves II-XII are grossly intact except for slightly shallow right nasolabial fold, dysarthria and dysphagia. No gross sensory deficit. Motor strength is 5/5 on the LUE and LLE, 0/5 on the RUE (except for 1/5 on the right shoulder), 2+/5 on RLE (except for 0/5 on the right ankle/foot).       Current Medications:  Current Facility-Administered Medications   Medication Dose Route Frequency    hydrALAZINE (APRESOLINE) tablet 100 mg  100 mg Oral Q8H    iron sucrose (VENOFER) 50 mg in 0.9% sodium chloride 100 mL IVPB  50 mg IntraVENous Q24H    baclofen (LIORESAL) tablet 10 mg  10 mg Oral TID    labetalol (NORMODYNE) tablet 600 mg  600 mg Oral Q12H    spironolactone (ALDACTONE) tablet 50 mg  50 mg Oral BID    ferrous sulfate tablet 325 mg  1 Tab Oral DAILY WITH BREAKFAST    ascorbic acid (vitamin C) (VITAMIN C) tablet 250 mg  250 mg Oral DAILY WITH BREAKFAST    cyanocobalamin tablet 1,000 mcg  1,000 mcg Oral DAILY    cholecalciferol (VITAMIN D3) capsule 5,000 Units  5,000 Units Oral DAILY    isosorbide dinitrate (ISORDIL) tablet 30 mg  30 mg Oral Q8H    hydrALAZINE (APRESOLINE) tablet 25 mg  25 mg Oral TID PRN    acetaminophen (TYLENOL) tablet 650 mg  650 mg Oral Q4H PRN    bisacodyl (DULCOLAX) tablet 10 mg  10 mg Oral Q48H PRN    heparin (porcine) injection 5,000 Units  5,000 Units SubCUTAneous Q8H    insulin lispro (HUMALOG) injection   SubCUTAneous TIDAC    magnesium oxide (MAG-OX) tablet 400 mg  400 mg Oral DAILY    aspirin chewable tablet 81 mg  81 mg Oral DAILY WITH BREAKFAST    amLODIPine (NORVASC) tablet 10 mg  10 mg Oral DAILY    atorvastatin (LIPITOR) tablet 80 mg  80 mg Oral QHS       Allergies:  No Known Allergies      Functional Progress:    OCCUPATIONAL THERAPY    ON ADMISSION MOST RECENT   Eating  Functional Level: 5   Eating  Functional Level: 5     Grooming  Functional Level: 3   Grooming  Functional Level: 5     Bathing  Functional Level: 2 Bathing  Functional Level: 3     Upper Body Dressing  Functional Level: 4   Upper Body Dressing  Functional Level: 4     Lower Body Dressing  Functional Level: 2   Lower Body Dressing  Functional Level: 4     Toileting  Functional Level: 2   Toileting  Functional Level: 4     Toilet Transfers  Toilet Transfer Score: 1   Toilet Transfers  Toilet Transfer Score: 3     Tub /Shower Transfers  Tub/Shower Transfer Score: 0   Tub/Shower Transfers  Tub/Shower Transfer Score: 3       Legend:   7 - Independent   6 - Modified Independent   5 - Standby Assistance / Supervision / Set-up   4 - Minimum Assistance / Contact Guard Assistance   3 - Moderate Assistance   2 - Maximum Assistance   1 - Total Assistance / Dependent       Lab/Data Review:  Recent Results (from the past 24 hour(s))   GLUCOSE, POC    Collection Time: 08/27/19  4:46 PM   Result Value Ref Range    Glucose (POC) 124 (H) 70 - 110 mg/dL   GLUCOSE, POC    Collection Time: 08/27/19  9:56 PM   Result Value Ref Range    Glucose (POC) 113 (H) 70 - 135 mg/dL   METABOLIC PANEL, BASIC    Collection Time: 08/28/19  6:09 AM   Result Value Ref Range    Sodium 140 136 - 145 mmol/L    Potassium 4.6 3.5 - 5.5 mmol/L    Chloride 108 100 - 111 mmol/L    CO2 24 21 - 32 mmol/L    Anion gap 8 3.0 - 18 mmol/L    Glucose 88 74 - 99 mg/dL    BUN 37 (H) 7.0 - 18 MG/DL    Creatinine 1.69 (H) 0.6 - 1.3 MG/DL    BUN/Creatinine ratio 22 (H) 12 - 20      GFR est AA 50 (L) >60 ml/min/1.73m2    GFR est non-AA 41 (L) >60 ml/min/1.73m2    Calcium 8.3 (L) 8.5 - 10.1 MG/DL   MAGNESIUM    Collection Time: 08/28/19  6:09 AM   Result Value Ref Range    Magnesium 2.3 1.6 - 2.6 mg/dL   PHOSPHORUS    Collection Time: 08/28/19  6:09 AM   Result Value Ref Range    Phosphorus 5.4 (H) 2.5 - 4.9 MG/DL   GLUCOSE, POC    Collection Time: 08/28/19  8:06 AM   Result Value Ref Range    Glucose (POC) 108 70 - 110 mg/dL   GLUCOSE, POC    Collection Time: 08/28/19 12:39 PM   Result Value Ref Range    Glucose (POC) 98 70 - 110 mg/dL       Estimated Glomerular Filtration Rate:  On admission, estimated GFR based on a Creatinine of 1.20 mg/dl:              Using CKD-EPI = 63.1 mL/min/1.73m2              Using MDRD = 64.6 mL/min/1.73m2  Most recent estimated GFR, based on a Creatinine of 1.69 mg/dl on 8/28/2019:   Using CKD-EPI = 41.7 mL/min/1.73m2   Using MDRD = 43.5 mL/min/1.73m2       Assessment:     Primary Rehabilitation Diagnosis  1. Impaired Mobility and ADLs  2. Acute Ischemic Stroke (acute/early subacute infarct at the left posterior basal ganglia to corona radiata) with residual right hemiparesis, dysphagia and dysarthria     Comorbidities   Obesity, Class I, BMI 30-34.9 E66.9    Obstructive sleep apnea on CPAP G47.33, Z99.89    Hypertensive heart and kidney disease without heart failure and with stage 2 chronic kidney disease I13.10, N18.2    Chronic hypokalemia E87.6    CKD (chronic kidney disease) stage 2, GFR 60-89 ml/min N18.2    Current use of aspirin Z79.82    On statin therapy due to risk of future cardiovascular event Z79.899    Type 2 diabetes mellitus with stage 2 chronic kidney disease  E11.22, N18.2    Pure hypercholesterolemia E78.00    Elevated prostate specific antigen (PSA) R97.20    Refusal of statin medication by patient Z48.34    First degree atrioventricular block by electrocardiogram I44.0    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 20X0    Leukocytosis D72.829    Iron deficiency anemia D50.9    Vitamin B12 anemia D51.9    Vitamin D deficiency E55.9    Secondary hyperparathyroidism of renal origin N25.80        Plan:     1. Justification for continued stay: Good progression towards established rehabilitation goals. 2. Medical Issues being followed closely:    [x]  Fall and safety precautions     []  Wound Care     [x]  Bowel and Bladder Function     [x]  Fluid Electrolyte and Nutrition Balance     []  Pain Control      3.  Issues that 24 hour rehabilitation nursing is following:    [x]  Fall and safety precautions     []  Wound Care     [x]  Bowel and Bladder Function     [x]  Fluid Electrolyte and Nutrition Balance     []  Pain Control      [x]  Assistance with and education on in-room safety with transfers to and from the bed, wheelchair, toilet and shower. 4. Acute rehabilitation plan of care:    [x]  Continue current care and rehab. [x]  Physical Therapy           [x]  Occupational Therapy           [x]  Speech Therapy     []  Hold Rehab until further notice     5. Medications:    [x]  MAR Reviewed     [x]  Continue Present Medications     6. DVT Prophylaxis:      []  Lovenox     [x]  Unfractionated Heparin     []  Coumadin     []  NOAC     []  SUZAN Stockings     []  Sequential Compression Device     []  None     7.  Orders:   > Acute Ischemic Stroke (acute/early subacute infarct at the left posterior basal ganglia to corona radiata) with residual right hemiparesis, dysphagia and dysarthria; S/P Administration of intravenous tPA (8/12/2019 - [address])   > On 8/22/2019, started Baclofen 5 mg PO TID   > On 8/27/2019, increased Baclofen from 5 mg to 10 mg PO TID   > Continue:    > Aspirin 81 mg PO once daily with breakfast    > Atorvastatin 80 mg PO q HS    > Baclofen 10 mg PO TID    > Chronic hypokalemia    Serum Potassium during hospital stay at [address]      08/15/19  0330 08/14/19  1848 08/14/19  0342 08/13/19  1205 08/12/19  2328   K 3.0* 2.8* 2.7* 2.8* 2.4*          > K (8/16/2019, on admission) = 3.8   > Mg (8/16/2019, on admission) = 1.9   > Upon admission to the ARU, patient was given Klor Con 40 meq PO BID with meals x 2 days   > K (8/17/2019) = 3.5   > K (8/18/2019) = 3.7   > K (8/19/2019) = 3.5   > K (8/22/2019) = 3.6   > Mg (8/22/2019) = 2.2   > On 8/22/2019, started Spironolactone 50 mg PO BID   > K (8/24/2019) = 3.8   > K (8/26/2019) = 4.4   > Will need to monitor serum potassium levels closely as patient is at high risk for hyperkalemia due slowly rising serum potassium levels since starting high-dose Spironolactone; planned to add HCTZ or Furosemide to offset the potassium-sparing characteristics of Spironolactone but will hold off due to rising BUN/Creatinine levels   > K (8/28/2019) = 4.6   > Mg (8/28/2019) = 2.3   > Discontinue Mg(OH)2 400 mg PO once daily   > Continue Spironolactone 50 mg PO BID    > Hypertensive heart and kidney disease without heart failure and with stage 2 chronic kidney disease   > Upon admission to the ARU, increased Hydralazine from 50 mg to 75 mg PO q 8 hr (6AM, 2PM, 10PM)   > Once work-up for primary hyperaldosteronism is complete, plan to start Spironolactone 25 mg PO once daily   > On 8/16/2019:    > Discontinued Metoprolol succinate 50 mg PO once daily (6AM)    > Started:     > Labetalol 200 mg PO q 12 hr (9AM, 9PM)     > Isosorbide dinitrate 10 mg P) q 8 hr (6AM, 2PM, 10PM)   > On 8/18/2019, added Hydralazine 25 mg PO TID PRN for SBP greater than 170 mmHg   > On 8/19/2019:    > Increased Labetalol from 200 mg to 300 mg PO q 12 hr (9AM, 9PM)    > Increased Isosorbide dinitrate from 10 mg to 20 mg PO q 8 hr (6AM, 2PM, 10PM)   > On 8/20/2019:    > Increased Labetalol from 300 mg to 400 mg PO q 12 hr (9AM, 9PM)    > Increased Isosorbide dinitrate from 20 mg to 30 mg PO q 8 hr (6AM, 2PM, 10PM)   > On 8/22/2019:    > Increased Labetalol from 400 mg to 600 mg PO q 12 hr (9AM, 9PM)    > Started Spironolactone 50 mg PO BID   > On 8/27/2019, increased Hydralazine from 75 mg to 100 mg PO q 8 hr (6AM, 2PM, 10PM)   > Plan to add ACE-I or ARB for proteinuria once renal function stable and serum creatinine and serum potassium are no longer rising upwards   > Continue:    > Amlodipine 10 mg PO once daily (9AM)    > Decrease Hydralazine from 100 mg to 75 mg PO q 8 hr (6AM, 2PM, 10PM)    > Labetalol 600 mg PO q 12 hr (9AM, 9PM)    > Isosorbide dinitrate 30 mg PO q 8 hr (6AM, 2PM, 10PM)    > Spironolactone 50 mg PO BID    > Hydralazine 25 mg PO TID PRN for SBP greater than 170 mmHg    > Pure hypercholesterolemia   > Lipid profile (8/13/2019) showed , , HDL 42,    > Lipid profile (8/14/2019) showed , , HDL 39, LDL 94   > Continue Atorvastatin 80 mg PO q HS    > Type 2 diabetes mellitus with stage 2 chronic kidney disease   > HbA1c (4/16/2014) = 6.2   > Patient has had chronic kidney disease even before his diagnosis of Type 2 diabetes mellitus and is presumed to be due to prolonged uncontrolled hypertension   > HbA1c (8/13/2019) = 6.6   > Continue Humalog insulin sliding scale SC TID AC    >  ? Primary hyperaldosteronism as etiology of chronic hypokalemia and difficult to control hypertension   > Endocrinology consult (Dr. Lindsey Mayfield) was called at [facility] prior to discharge/transfer to the ARU   > Aldosterone/Renin activity (8/16/2019):    > Aldosterone = 8.8 (NORMAL)    > Renin Activity = 0.300 (NORMAL)    > Aldosterone/Renin Activity = 29 (ELEVATED) ~ EQUIVOCAL, requires confirmation    > Last intake of Losartan was on 8/11/2019 (prior to admission)    > Endocrinology consult (Dr. Majanofernando Son):    > ASSESSMENT:     1. Chronic hypokalemia. 2.  Diabetes mellitus. 3.  Diabetic nephropathy with a stage II chronic renal disease. 4.  Normocytic anemia. 5.  Vitamin B12 deficiency. 6.  Borderline iron deficiency. 7.  Hypoalbuminemia.     > DISCUSSION:  The patient may well have hyperaldosteronism, but he may have it on the basis of his nephropathy, which is due to his underlying diabetes. The cause of his anemia might be a combination of B12 deficiency and iron deficiency together, so investigation should be done for this.     > PLAN: Laboratory studies:     1.  24-hour urine for aldosteronism (8/20/2019) = 12.78 (N.V. = 0-19) (NORMAL)     2.   Methylmalonic acid level (8/18/2019) = 468 (ELEVATED)     3.  Gastrin level (8/20/2019) = <10 (NORMAL)     4. Microalbumin-to-creatinine ratio. 5.  Transferrin and prealbumin levels. > FSH (8/19/2019) = 5.6 (NORMAL)    > LH (8/18/2019) = 6.8 (NORMAL)    > Free T4 (8/18/2019) = 1.0 (NORMAL)    > TSH (8/18/2019) = 4.63 (ELEVATED)    > Urine aldosterone (8/20/2019): 11.1 ug/L    > Transferrin (8/22/2019) = 275   > Excerpt from the Progress Note (dated 8/22/2019) by Dr. Danelle Carrion:    > \"Griffin level is not elevated. 24 hour urine griffin is pending. The aldosterone/renin level is not high. If he has primary aldosteronism, it is very likely to be idiopathic rather an an aldosterone producing adenoma. This is susceptible to medical therapy. Will start spironolactone at 50 mg twice daily. \"    > On 8/22/2019, started Spironolactone 50 mg PO BID   > Unable to proceed with plasma aldosterone confirmatory tests as patient had been started on high-dose Spironolactone; unable to determine presence of adrenal adenoma in the absence of imaging studies   > Continue Spironolactone 50 mg PO BID    > Leukocytosis, resolved   > WBC count (8/15/2019) = 11.7   > No fever documented since admission to the ARU   > WBC count (8/16/2019, on admission) = 17.9   > Work-up:    > Urinalysis (8/16/2019): WNL    > Chest x-ray (PA-Lateral) (8/16/2019) showed a minimally blunted left posterior costophrenic angle could be due to either a tiny effusion or chronic pleural reaction/scarring; mild cardiomegaly; atherosclerosis.    > WBC count (8/18/2019) = 11.6    > Iron deficiency anemia / Vitamin B12 anemia   > Anemia work-up (8/14/2019) showed serum iron 22, TIBC 371, iron % saturation 6, ferritin 34, reticulocyte count 1.4   > Vitamin B12 (8/14/2019) = 208   > Hgb/Hct (8/15/2019) = 10.1/31.3    > Hgb/Hct (8/16/2019, on admission) = 10.7/32.6   > Hgb/Hct (8/18/2019) = 8.7/27.4   > Hgb/Hct (8/19/2019) = 8.4/25.2   > On 8/19/2019, started:     > FeSO4 325 mg PO once daily with breakfast     > Ascorbic Acid 250 mg PO once daily with breakfast (to enhance the absorption of the FeSO4)     > Cyanocobalamin 1,000 mcg PO once daily    > Epoetin tone 10,000 units SC x 1 dose   > Hgb/Hct (8/22/2019) = 8.6/26.3   > On 8/22/2019, Epoetin tone 10,000 units SC x 1 dose     > Hgb/Hct (8/26/2019) = 8.2/25.6   > On 8/26/2019, patient was given Epoetin tone 10,000 units SC x 1 dose     > On 8/27/2019, started Iron sucrose 50 mg IV q 24 hr   > Discontinue:    > FeSO4 325 mg PO once daily with breakfast     > Ascorbic Acid 250 mg PO once daily with breakfast (to enhance the absorption of the FeSO4)    > Continue:    > Cyanocobalamin 1,000 mcg PO once daily    > Increase Iron sucrose from 50 mg to 200 mg IV q 24 hr    > Vitamin D Deficiency   > PTH (8/18/2019) = 101.7   > Vitamin D 25-Hydroxy (8/19/2019) = 16.2   > On 8/19/2019, patient was given Cholecalciferol 50,000 units PO x 1 dose    > On 8/20/2019, started Cholecalciferol 5,000 units PO once daily   > Continue Cholecalciferol 5,000 units PO once daily    > Right ankle swelling, most likely dependent edema due to hemiparesis/lack of muscle contraction and osteoarthritis of the right ankle   > (+) nonpainful swelling of the right ankle for the past few days; denies any trauma   > X-ray of the right ankle (8/22/2019) showed no acute fracture or subluxation; advanced degenerative changes at the tibiotalar joint; old fracture fragment vs. accessory ossicle in the inferior aspect of the lateral malleolus; soft tissue swelling around the ankle.   > Elevate right leg/foot when supine in bed    > Stage 2 chronic kidney disease --> Acute kidney injury superimposed on CKD stage 2 VS progression to Stage 3 chronic kidney disease   > On admission, estimated GFR based on a Creatinine of 1.20 mg/dl:               > Using CKD-EPI = 63.1 mL/min/1.73m2               > Using MDRD = 64.6 mL/min/1.73m2   > Estimated GFR, based on a Creatinine of 1.49 mg/dl on 8/26/2019:    > Using CKD-EPI = 48.6 mL/min/1.73m2    > Using MDRD = 57.4 mL/min/1.73m2    > Urine creatinine (8/26/2019) = 31.00   > Microalbumin, urine (8/26/2019) = 56.00   > Microalbumin/Creatinine ratio (8/26/2019) = 1806   > Nephrology consult (Dr. Raj Mcclelland) for evaluation and comanagement    > Impression:     > Chronic kidney disease stage III with heavy proteinuria, diabetic nephropathy     > Proteinuria, diabetes related, not on any antiproteinuric medication      > Hypertension, well controlled     > Diabetes     > Recent acute ischemic stroke     > Chronic hypokalemia, on spiranolactone     > Anemia     > Secondary hyperparathyroidism    > Plan:     > I will start him on IV Venofer, side effects discussed. Plan to start on Epogen once his iron store is repleted. > Continue spiranolactone, monitor potassium.      > Monitor renal function for now, will add ACE inhibitor for protienuria in near future. > Adjust medication for current renal function status.    > BUN/Creatinine (8/28/2019) = 37/1.69     > Diet:   > On 8/16/2019, solids consistency was advanced from Mechanical soft (NDD 2) to Soft solids (NDD 3)   > Modified barium swallow (8/27/2019) showed silent aspiration of thin liquid; no drew penetration or aspiration with other tested consistencies. > Specifications: Diabetic, low saturated fat   > Solids (consistency): Soft solids (NDD 3)   > Liquids (consistency): Mildly thick ([place]-thick)       8. Patient's progress in rehabilitation and medical issues discussed with the patient. All questions answered to the best of my ability. Care plan discussed with patient, wife and nurse.       Signed:    Moriah Bauer MD    August 28, 2019

## 2019-08-29 LAB
ANION GAP SERPL CALC-SCNC: 9 MMOL/L (ref 3–18)
BUN SERPL-MCNC: 36 MG/DL (ref 7–18)
BUN/CREAT SERPL: 23 (ref 12–20)
CALCIUM SERPL-MCNC: 8.7 MG/DL (ref 8.5–10.1)
CHLORIDE SERPL-SCNC: 108 MMOL/L (ref 100–111)
CO2 SERPL-SCNC: 24 MMOL/L (ref 21–32)
CREAT SERPL-MCNC: 1.54 MG/DL (ref 0.6–1.3)
FOLATE SERPL-MCNC: 4.9 NG/ML (ref 3.1–17.5)
GLUCOSE BLD STRIP.AUTO-MCNC: 108 MG/DL (ref 70–110)
GLUCOSE BLD STRIP.AUTO-MCNC: 118 MG/DL (ref 70–110)
GLUCOSE BLD STRIP.AUTO-MCNC: 142 MG/DL (ref 70–110)
GLUCOSE BLD STRIP.AUTO-MCNC: 144 MG/DL (ref 70–110)
GLUCOSE SERPL-MCNC: 91 MG/DL (ref 74–99)
HCT VFR BLD AUTO: 25.5 % (ref 36–48)
HGB BLD-MCNC: 8 G/DL (ref 13–16)
PLATELET # BLD AUTO: 290 K/UL (ref 135–420)
POTASSIUM SERPL-SCNC: 4.1 MMOL/L (ref 3.5–5.5)
SODIUM SERPL-SCNC: 141 MMOL/L (ref 136–145)
VIT B12 SERPL-MCNC: 409 PG/ML (ref 211–911)

## 2019-08-29 PROCEDURE — 65310000000 HC RM PRIVATE REHAB

## 2019-08-29 PROCEDURE — 80048 BASIC METABOLIC PNL TOTAL CA: CPT

## 2019-08-29 PROCEDURE — 85049 AUTOMATED PLATELET COUNT: CPT

## 2019-08-29 PROCEDURE — 97110 THERAPEUTIC EXERCISES: CPT

## 2019-08-29 PROCEDURE — 74011250637 HC RX REV CODE- 250/637: Performed by: FAMILY MEDICINE

## 2019-08-29 PROCEDURE — 97530 THERAPEUTIC ACTIVITIES: CPT

## 2019-08-29 PROCEDURE — 74011250637 HC RX REV CODE- 250/637: Performed by: INTERNAL MEDICINE

## 2019-08-29 PROCEDURE — 92507 TX SP LANG VOICE COMM INDIV: CPT

## 2019-08-29 PROCEDURE — 82607 VITAMIN B-12: CPT

## 2019-08-29 PROCEDURE — 92526 ORAL FUNCTION THERAPY: CPT

## 2019-08-29 PROCEDURE — 36415 COLL VENOUS BLD VENIPUNCTURE: CPT

## 2019-08-29 PROCEDURE — 82962 GLUCOSE BLOOD TEST: CPT

## 2019-08-29 PROCEDURE — 97535 SELF CARE MNGMENT TRAINING: CPT

## 2019-08-29 PROCEDURE — 85018 HEMOGLOBIN: CPT

## 2019-08-29 PROCEDURE — 97116 GAIT TRAINING THERAPY: CPT

## 2019-08-29 PROCEDURE — 97112 NEUROMUSCULAR REEDUCATION: CPT

## 2019-08-29 PROCEDURE — 77030037878 HC DRSG MEPILEX >48IN BORD MOLN -B

## 2019-08-29 PROCEDURE — 74011250636 HC RX REV CODE- 250/636: Performed by: INTERNAL MEDICINE

## 2019-08-29 RX ORDER — LABETALOL 200 MG/1
600 TABLET, FILM COATED ORAL EVERY 12 HOURS
Status: DISCONTINUED | OUTPATIENT
Start: 2019-08-29 | End: 2019-09-05 | Stop reason: HOSPADM

## 2019-08-29 RX ORDER — LANOLIN ALCOHOL/MO/W.PET/CERES
1 CREAM (GRAM) TOPICAL
Status: DISCONTINUED | OUTPATIENT
Start: 2019-08-30 | End: 2019-09-02

## 2019-08-29 RX ADMIN — BACLOFEN 10 MG: 10 TABLET ORAL at 06:00

## 2019-08-29 RX ADMIN — HEPARIN SODIUM 5000 UNITS: 5000 INJECTION INTRAVENOUS; SUBCUTANEOUS at 21:30

## 2019-08-29 RX ADMIN — BACLOFEN 10 MG: 10 TABLET ORAL at 18:01

## 2019-08-29 RX ADMIN — HEPARIN SODIUM 5000 UNITS: 5000 INJECTION INTRAVENOUS; SUBCUTANEOUS at 06:26

## 2019-08-29 RX ADMIN — ASPIRIN 81 MG 81 MG: 81 TABLET ORAL at 08:51

## 2019-08-29 RX ADMIN — LABETALOL HCL 600 MG: 200 TABLET, FILM COATED ORAL at 08:51

## 2019-08-29 RX ADMIN — HYDRALAZINE HYDROCHLORIDE 75 MG: 50 TABLET, FILM COATED ORAL at 14:15

## 2019-08-29 RX ADMIN — SPIRONOLACTONE 50 MG: 25 TABLET ORAL at 21:13

## 2019-08-29 RX ADMIN — ATORVASTATIN CALCIUM 80 MG: 40 TABLET, FILM COATED ORAL at 21:13

## 2019-08-29 RX ADMIN — SPIRONOLACTONE 50 MG: 25 TABLET ORAL at 08:51

## 2019-08-29 RX ADMIN — LABETALOL HYDROCHLORIDE 600 MG: 200 TABLET, FILM COATED ORAL at 18:01

## 2019-08-29 RX ADMIN — HEPARIN SODIUM 5000 UNITS: 5000 INJECTION INTRAVENOUS; SUBCUTANEOUS at 14:16

## 2019-08-29 RX ADMIN — ISOSORBIDE DINITRATE 30 MG: 20 TABLET ORAL at 21:12

## 2019-08-29 RX ADMIN — ISOSORBIDE DINITRATE 30 MG: 20 TABLET ORAL at 14:16

## 2019-08-29 RX ADMIN — HYDRALAZINE HYDROCHLORIDE 75 MG: 50 TABLET, FILM COATED ORAL at 21:11

## 2019-08-29 RX ADMIN — CYANOCOBALAMIN TAB 1000 MCG 1000 MCG: 1000 TAB at 08:51

## 2019-08-29 RX ADMIN — BACLOFEN 10 MG: 10 TABLET ORAL at 12:55

## 2019-08-29 RX ADMIN — AMLODIPINE BESYLATE 10 MG: 10 TABLET ORAL at 08:51

## 2019-08-29 RX ADMIN — HYDRALAZINE HYDROCHLORIDE 75 MG: 50 TABLET, FILM COATED ORAL at 06:21

## 2019-08-29 RX ADMIN — ISOSORBIDE DINITRATE 30 MG: 20 TABLET ORAL at 06:22

## 2019-08-29 RX ADMIN — Medication 5000 UNITS: at 08:51

## 2019-08-29 NOTE — PROGRESS NOTES
[facility]  [address] [place] [address], [address]     INPATIENT REHABILITATION  DAILY PROGRESS NOTE     Date: 8/29/2019    Name: Donnell Dominguez Age / Sex: 71 y.o. / male   CSN: [id number] MRN: [id number]   [facility] Date: 8/15/2019 Length of Stay: 14 days     Primary Rehab Diagnosis: Impaired Mobility and ADLs secondary to Acute Ischemic Stroke (acute/early subacute infarct at the left posterior basal ganglia to corona radiata) with residual right hemiparesis, dysphagia and dysarthria      Subjective:     Patient seen and examined. Blood pressure fairly controlled. Blood glucose controlled. Patient's Complaint:   No significant medical complaints    Pain Control: no current joint or muscle symptoms, essentially pain-free      Objective:     Vital Signs:  Patient Vitals for the past 24 hrs:   BP Temp Pulse Resp SpO2   08/29/19 0621 183/79 -- 63 -- --   08/28/19 2202 185/72 97.1 °F (36.2 °C) 68 18 96 %   08/28/19 1521 178/65 98.9 °F (37.2 °C) 71 18 99 %        Physical Examination:  GENERAL SURVEY: Patient is awake, alert, oriented x 3, sitting comfortably on the chair, not in acute respiratory distress. HEENT: pink palpebral conjunctivae, anicteric sclerae, no nasoaural discharge, moist oral mucosa  NECK: supple, no jugular venous distention, no palpable lymph nodes  CHEST/LUNGS: symmetrical chest expansion, good air entry, clear breath sounds  HEART: adynamic precordium, good S1 S2, no S3, regular rhythm, no murmurs  ABDOMEN: obese, bowel sounds appreciated, soft, non-tender  EXTREMITIES: pink nailbeds, no edema except for right ankle swelling, full and equal pulses, no calf tenderness   NEUROLOGICAL EXAM: The patient is awake, alert and oriented x3, able to answer questions fairly appropriately, able to follow 1 and 2 step commands. Able to tell time from the wall clock.   Cranial nerves II-XII are grossly intact except for slightly shallow right nasolabial fold, dysarthria and dysphagia. No gross sensory deficit. Motor strength is 5/5 on the LUE and LLE, 0/5 on the RUE (except for 1/5 on the right shoulder), 2+/5 on RLE (except for 0/5 on the right ankle/foot).       Current Medications:  Current Facility-Administered Medications   Medication Dose Route Frequency    hydrALAZINE (APRESOLINE) tablet 75 mg  75 mg Oral Q8H    iron sucrose (VENOFER) 200 mg in 0.9% sodium chloride 100 mL IVPB  200 mg IntraVENous Q24H    baclofen (LIORESAL) tablet 10 mg  10 mg Oral TID    labetalol (NORMODYNE) tablet 600 mg  600 mg Oral Q12H    spironolactone (ALDACTONE) tablet 50 mg  50 mg Oral BID    cyanocobalamin tablet 1,000 mcg  1,000 mcg Oral DAILY    cholecalciferol (VITAMIN D3) capsule 5,000 Units  5,000 Units Oral DAILY    isosorbide dinitrate (ISORDIL) tablet 30 mg  30 mg Oral Q8H    hydrALAZINE (APRESOLINE) tablet 25 mg  25 mg Oral TID PRN    acetaminophen (TYLENOL) tablet 650 mg  650 mg Oral Q4H PRN    bisacodyl (DULCOLAX) tablet 10 mg  10 mg Oral Q48H PRN    heparin (porcine) injection 5,000 Units  5,000 Units SubCUTAneous Q8H    insulin lispro (HUMALOG) injection   SubCUTAneous TIDAC    aspirin chewable tablet 81 mg  81 mg Oral DAILY WITH BREAKFAST    amLODIPine (NORVASC) tablet 10 mg  10 mg Oral DAILY    atorvastatin (LIPITOR) tablet 80 mg  80 mg Oral QHS       Allergies:  No Known Allergies      Functional Progress:    OCCUPATIONAL THERAPY    ON ADMISSION MOST RECENT   Eating  Functional Level: 5   Eating  Functional Level: 5     Grooming  Functional Level: 3   Grooming  Functional Level: 5     Bathing  Functional Level: 2   Bathing  Functional Level: 3     Upper Body Dressing  Functional Level: 4   Upper Body Dressing  Functional Level: 4     Lower Body Dressing  Functional Level: 2   Lower Body Dressing  Functional Level: 4     Toileting  Functional Level: 2   Toileting  Functional Level: 4     Toilet Transfers  Toilet Transfer Score: 1   Toilet Transfers  Toilet Transfer Score: 4     Tub /Shower Transfers  Tub/Shower Transfer Score: 0   Tub/Shower Transfers  Tub/Shower Transfer Score: 3       Legend:   7 - Independent   6 - Modified Independent   5 - Standby Assistance / Supervision / Set-up   4 - Minimum Assistance / Contact Guard Assistance   3 - Moderate Assistance   2 - Maximum Assistance   1 - Total Assistance / Dependent       Lab/Data Review:  Recent Results (from the past 24 hour(s))   GLUCOSE, POC    Collection Time: 08/28/19  4:45 PM   Result Value Ref Range    Glucose (POC) 119 (H) 70 - 110 mg/dL   GLUCOSE, POC    Collection Time: 08/28/19  9:54 PM   Result Value Ref Range    Glucose (POC) 104 70 - 216 mg/dL   METABOLIC PANEL, BASIC    Collection Time: 08/29/19  6:06 AM   Result Value Ref Range    Sodium 141 136 - 145 mmol/L    Potassium 4.1 3.5 - 5.5 mmol/L    Chloride 108 100 - 111 mmol/L    CO2 24 21 - 32 mmol/L    Anion gap 9 3.0 - 18 mmol/L    Glucose 91 74 - 99 mg/dL    BUN 36 (H) 7.0 - 18 MG/DL    Creatinine 1.54 (H) 0.6 - 1.3 MG/DL    BUN/Creatinine ratio 23 (H) 12 - 20      GFR est AA 55 (L) >60 ml/min/1.73m2    GFR est non-AA 46 (L) >60 ml/min/1.73m2    Calcium 8.7 8.5 - 10.1 MG/DL   HGB & HCT    Collection Time: 08/29/19  6:06 AM   Result Value Ref Range    HGB 8.0 (L) 13.0 - 16.0 g/dL    HCT 25.5 (L) 36.0 - 48.0 %   PLATELET COUNT    Collection Time: 08/29/19  6:06 AM   Result Value Ref Range    PLATELET 398 779 - 647 K/uL   VITAMIN B12 & FOLATE    Collection Time: 08/29/19  6:06 AM   Result Value Ref Range    Vitamin B12 409 211 - 911 pg/mL    Folate 4.9 3.10 - 17.50 ng/mL   GLUCOSE, POC    Collection Time: 08/29/19  8:00 AM   Result Value Ref Range    Glucose (POC) 108 70 - 110 mg/dL   GLUCOSE, POC    Collection Time: 08/29/19 11:16 AM   Result Value Ref Range    Glucose (POC) 144 (H) 70 - 110 mg/dL       Estimated Glomerular Filtration Rate:  On admission, estimated GFR based on a Creatinine of 1.20 mg/dl:              Using CKD-EPI = 63.1 mL/min/1.73m2 Using MDRD = 64.6 mL/min/1.73m2  Most recent estimated GFR, based on a Creatinine of 1.54 mg/dl on 8/29/2019:   Using CKD-EPI = 46.7 mL/min/1.73m2   Using MDRD = 48.4 mL/min/1.73m2       Assessment:     Primary Rehabilitation Diagnosis  1. Impaired Mobility and ADLs  2. Acute Ischemic Stroke (acute/early subacute infarct at the left posterior basal ganglia to corona radiata) with residual right hemiparesis, dysphagia and dysarthria     Comorbidities   Obesity, Class I, BMI 30-34.9 E66.9    Obstructive sleep apnea on CPAP G47.33, Z99.89    Hypertensive heart and kidney disease without heart failure and with stage 2 chronic kidney disease I13.10, N18.2    Chronic hypokalemia E87.6    CKD (chronic kidney disease) stage 2, GFR 60-89 ml/min N18.2    Current use of aspirin Z79.82    On statin therapy due to risk of future cardiovascular event Z79.899    Type 2 diabetes mellitus with stage 2 chronic kidney disease  E11.22, N18.2    Pure hypercholesterolemia E78.00    Elevated prostate specific antigen (PSA) R97.20    Refusal of statin medication by patient Z48.34    First degree atrioventricular block by electrocardiogram I44.0    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 20X0    Leukocytosis D72.829    Iron deficiency anemia D50.9    Vitamin B12 anemia D51.9    Vitamin D deficiency E55.9    Secondary hyperparathyroidism of renal origin N25.80        Plan:     1. Justification for continued stay: Good progression towards established rehabilitation goals. 2. Medical Issues being followed closely:    [x]  Fall and safety precautions     []  Wound Care     [x]  Bowel and Bladder Function     [x]  Fluid Electrolyte and Nutrition Balance     []  Pain Control      3.  Issues that 24 hour rehabilitation nursing is following:    [x]  Fall and safety precautions     []  Wound Care     [x]  Bowel and Bladder Function     [x]  Fluid Electrolyte and Nutrition Balance     []  Pain Control      [x] Assistance with and education on in-room safety with transfers to and from the bed, wheelchair, toilet and shower. 4. Acute rehabilitation plan of care:    [x]  Continue current care and rehab. [x]  Physical Therapy           [x]  Occupational Therapy           [x]  Speech Therapy     []  Hold Rehab until further notice     5. Medications:    [x]  MAR Reviewed     [x]  Continue Present Medications     6. DVT Prophylaxis:      []  Lovenox     [x]  Unfractionated Heparin     []  Coumadin     []  NOAC     []  SUZAN Stockings     []  Sequential Compression Device     []  None     7.  Orders:   > Acute Ischemic Stroke (acute/early subacute infarct at the left posterior basal ganglia to corona radiata) with residual right hemiparesis, dysphagia and dysarthria; S/P Administration of intravenous tPA (8/12/2019 - [address])   > On 8/22/2019, started Baclofen 5 mg PO TID   > On 8/27/2019, increased Baclofen from 5 mg to 10 mg PO TID   > Continue:    > Aspirin 81 mg PO once daily with breakfast    > Atorvastatin 80 mg PO q HS    > Baclofen 10 mg PO TID    > Chronic hypokalemia    Serum Potassium during hospital stay at [address]      08/15/19  0330 08/14/19  1848 08/14/19  0342 08/13/19  1205 08/12/19  2328   K 3.0* 2.8* 2.7* 2.8* 2.4*          > K (8/16/2019, on admission) = 3.8   > Mg (8/16/2019, on admission) = 1.9   > Upon admission to the ARU, patient was given Klor Con 40 meq PO BID with meals x 2 days   > K (8/17/2019) = 3.5   > K (8/18/2019) = 3.7   > K (8/19/2019) = 3.5   > K (8/22/2019) = 3.6   > Mg (8/22/2019) = 2.2   > On 8/22/2019, started Spironolactone 50 mg PO BID   > K (8/24/2019) = 3.8   > K (8/26/2019) = 4.4   > Will need to monitor serum potassium levels closely as patient is at high risk for hyperkalemia due slowly rising serum potassium levels since starting high-dose Spironolactone; planned to add HCTZ or Furosemide to offset the potassium-sparing characteristics of Spironolactone but will hold off due to rising BUN/Creatinine levels   > K (8/28/2019) = 4.6   > Mg (8/28/2019) = 2.3   > On 8/28/2019, discontinued Mg(OH)2 400 mg PO once daily   > K (8/29/2019) = 4.1   > Continue Spironolactone 50 mg PO BID    > Hypertensive heart and kidney disease without heart failure and with stage 2 chronic kidney disease   > Upon admission to the ARU, increased Hydralazine from 50 mg to 75 mg PO q 8 hr (6AM, 2PM, 10PM)   > Once work-up for primary hyperaldosteronism is complete, plan to start Spironolactone 25 mg PO once daily   > On 8/16/2019:    > Discontinued Metoprolol succinate 50 mg PO once daily (6AM)    > Started:     > Labetalol 200 mg PO q 12 hr (9AM, 9PM)     > Isosorbide dinitrate 10 mg P) q 8 hr (6AM, 2PM, 10PM)   > On 8/18/2019, added Hydralazine 25 mg PO TID PRN for SBP greater than 170 mmHg   > On 8/19/2019:    > Increased Labetalol from 200 mg to 300 mg PO q 12 hr (9AM, 9PM)    > Increased Isosorbide dinitrate from 10 mg to 20 mg PO q 8 hr (6AM, 2PM, 10PM)   > On 8/20/2019:    > Increased Labetalol from 300 mg to 400 mg PO q 12 hr (9AM, 9PM)    > Increased Isosorbide dinitrate from 20 mg to 30 mg PO q 8 hr (6AM, 2PM, 10PM)   > On 8/22/2019:    > Increased Labetalol from 400 mg to 600 mg PO q 12 hr (9AM, 9PM)    > Started Spironolactone 50 mg PO BID   > On 8/27/2019, increased Hydralazine from 75 mg to 100 mg PO q 8 hr (6AM, 2PM, 10PM)   > On 8/28/2019, decreased Hydralazine from 100 mg to 75 mg PO q 8 hr (6AM, 2PM, 10PM)   > Plan to add ACE-I or ARB for proteinuria once renal function stable and serum creatinine and serum potassium are no longer rising upwards   > Continue:    > Amlodipine 10 mg PO once daily (9AM)    > Hydralazine 75 mg PO q 8 hr (6AM, 2PM, 10PM)    > Labetalol 600 mg PO q 12 hr (change from 9AM, 9PM to 6AM, 6PM)    > Isosorbide dinitrate 30 mg PO q 8 hr (6AM, 2PM, 10PM)    > Spironolactone 50 mg PO BID (9AM, 9PM)    > Hydralazine 25 mg PO TID PRN for SBP greater than 170 mmHg    > Pure hypercholesterolemia   > Lipid profile (8/13/2019) showed , , HDL 42,    > Lipid profile (8/14/2019) showed , , HDL 39, LDL 94   > Continue Atorvastatin 80 mg PO q HS    > Type 2 diabetes mellitus with stage 2 chronic kidney disease   > HbA1c (4/16/2014) = 6.2   > Patient has had chronic kidney disease even before his diagnosis of Type 2 diabetes mellitus and is presumed to be due to prolonged uncontrolled hypertension   > HbA1c (8/13/2019) = 6.6   > Continue Humalog insulin sliding scale SC TID AC    >  ? Primary hyperaldosteronism as etiology of chronic hypokalemia and difficult to control hypertension   > Endocrinology consult (Dr. Roberta Lynn) was called at [facility] prior to discharge/transfer to the ARU   > Aldosterone/Renin activity (8/16/2019):    > Aldosterone = 8.8 (NORMAL)    > Renin Activity = 0.300 (NORMAL)    > Aldosterone/Renin Activity = 29 (ELEVATED) ~ EQUIVOCAL, requires confirmation    > Last intake of Losartan was on 8/11/2019 (prior to admission)    > Endocrinology consult (Dr. Martell Conway):    > ASSESSMENT:     1. Chronic hypokalemia. 2.  Diabetes mellitus. 3.  Diabetic nephropathy with a stage II chronic renal disease. 4.  Normocytic anemia. 5.  Vitamin B12 deficiency. 6.  Borderline iron deficiency. 7.  Hypoalbuminemia.     > DISCUSSION:  The patient may well have hyperaldosteronism, but he may have it on the basis of his nephropathy, which is due to his underlying diabetes. The cause of his anemia might be a combination of B12 deficiency and iron deficiency together, so investigation should be done for this.     > PLAN: Laboratory studies:     1.  24-hour urine for aldosteronism (8/20/2019) = 12.78 (N.V. = 0-19) (NORMAL)     2.   Methylmalonic acid level (8/18/2019) = 468 (ELEVATED)     3.  Gastrin level (8/20/2019) = <10 (NORMAL)     4. Microalbumin-to-creatinine ratio. 5.  Transferrin and prealbumin levels. > FSH (8/19/2019) = 5.6 (NORMAL)    > LH (8/18/2019) = 6.8 (NORMAL)    > Free T4 (8/18/2019) = 1.0 (NORMAL)    > TSH (8/18/2019) = 4.63 (ELEVATED)    > Urine aldosterone (8/20/2019): 11.1 ug/L    > Transferrin (8/22/2019) = 275   > Excerpt from the Progress Note (dated 8/22/2019) by Dr. Leonor Gurrola:    > \"Rainer level is not elevated. 24 hour urine rainer is pending. The aldosterone/renin level is not high. If he has primary aldosteronism, it is very likely to be idiopathic rather an an aldosterone producing adenoma. This is susceptible to medical therapy. Will start spironolactone at 50 mg twice daily. \"    > On 8/22/2019, started Spironolactone 50 mg PO BID   > Unable to proceed with plasma aldosterone confirmatory tests as patient had been started on high-dose Spironolactone; unable to determine presence of adrenal adenoma in the absence of imaging studies   > Continue Spironolactone 50 mg PO BID    > Leukocytosis, resolved   > WBC count (8/15/2019) = 11.7   > No fever documented since admission to the ARU   > WBC count (8/16/2019, on admission) = 17.9   > Work-up:    > Urinalysis (8/16/2019): WNL    > Chest x-ray (PA-Lateral) (8/16/2019) showed a minimally blunted left posterior costophrenic angle could be due to either a tiny effusion or chronic pleural reaction/scarring; mild cardiomegaly; atherosclerosis.    > WBC count (8/18/2019) = 11.6    > Iron deficiency anemia / Vitamin B12 anemia   > Anemia work-up (8/14/2019) showed serum iron 22, TIBC 371, iron % saturation 6, ferritin 34, reticulocyte count 1.4   > Vitamin B12 (8/14/2019) = 208   > Hgb/Hct (8/15/2019) = 10.1/31.3    > Hgb/Hct (8/16/2019, on admission) = 10.7/32.6   > Hgb/Hct (8/18/2019) = 8.7/27.4   > Hgb/Hct (8/19/2019) = 8.4/25.2   > On 8/19/2019, started:     > FeSO4 325 mg PO once daily with breakfast     > Ascorbic Acid 250 mg PO once daily with breakfast (to enhance the absorption of the FeSO4)     > Cyanocobalamin 1,000 mcg PO once daily    > Epoetin tone 10,000 units SC x 1 dose   > Hgb/Hct (8/22/2019) = 8.6/26.3   > On 8/22/2019, Epoetin tone 10,000 units SC x 1 dose     > Hgb/Hct (8/26/2019) = 8.2/25.6   > On 8/26/2019, patient was given Epoetin otne 10,000 units SC x 1 dose     > On 8/27/2019, started Iron sucrose 50 mg IV q 24 hr   > On 8/28/2019:    > Discontinued:     > FeSO4 325 mg PO once daily with breakfast      > Ascorbic Acid 250 mg PO once daily with breakfast (to enhance the absorption of the FeSO4)     > Increased Iron sucrose from 50 mg to 200 mg IV q 24 hr   > Hgb/Hct (8/29/2019) = 8.0/25.5    > Vitamin B12 (8/29/2019) =  409 (from 208 on 8/14/2019)   > Continue:    > Cyanocobalamin 1,000 mcg PO once daily    > Iron sucrose 200 mg IV q 24 hr    > Vitamin D Deficiency   > PTH (8/18/2019) = 101.7   > Vitamin D 25-Hydroxy (8/19/2019) = 16.2   > On 8/19/2019, patient was given Cholecalciferol 50,000 units PO x 1 dose    > On 8/20/2019, started Cholecalciferol 5,000 units PO once daily   > Continue Cholecalciferol 5,000 units PO once daily    > Right ankle swelling, most likely dependent edema due to hemiparesis/lack of muscle contraction and osteoarthritis of the right ankle   > (+) nonpainful swelling of the right ankle for the past few days; denies any trauma   > X-ray of the right ankle (8/22/2019) showed no acute fracture or subluxation; advanced degenerative changes at the tibiotalar joint; old fracture fragment vs. accessory ossicle in the inferior aspect of the lateral malleolus; soft tissue swelling around the ankle.   > Elevate right leg/foot when supine in bed    > Stage 2 chronic kidney disease --> Acute kidney injury superimposed on CKD stage 2 VS progression to Stage 3 chronic kidney disease   > On admission, estimated GFR based on a Creatinine of 1.20 mg/dl:               > Using CKD-EPI = 63.1 mL/min/1.73m2               > Using MDRD = 64.6 mL/min/1.73m2   > Estimated GFR, based on a Creatinine of 1.49 mg/dl on 8/26/2019:    > Using CKD-EPI = 48.6 mL/min/1.73m2    > Using MDRD = 57.4 mL/min/1.73m2    > Urine creatinine (8/26/2019) = 31.00   > Microalbumin, urine (8/26/2019) = 56.00   > Microalbumin/Creatinine ratio (8/26/2019) = 1806   > Nephrology consult (Dr. Audelia Santizo) for evaluation and comanagement    > Impression:     > Chronic kidney disease stage III with heavy proteinuria, diabetic nephropathy     > Proteinuria, diabetes related, not on any antiproteinuric medication      > Hypertension, well controlled     > Diabetes     > Recent acute ischemic stroke     > Chronic hypokalemia, on spiranolactone     > Anemia     > Secondary hyperparathyroidism    > Plan:     > I will start him on IV Venofer, side effects discussed. Plan to start on Epogen once his iron store is repleted. > Continue spiranolactone, monitor potassium.      > Monitor renal function for now, will add ACE inhibitor for protienuria in near future. > Adjust medication for current renal function status.    > BUN/Creatinine (8/28/2019) = 37/1.69    > BUN/Creatinine (8/29/2019) = 36/1.54     > Diet:   > On 8/16/2019, solids consistency was advanced from Mechanical soft (NDD 2) to Soft solids (NDD 3)   > Modified barium swallow (8/27/2019) showed silent aspiration of thin liquid; no drew penetration or aspiration with other tested consistencies. > Specifications: Diabetic, low saturated fat   > Solids (consistency): Soft solids (NDD 3)   > Liquids (consistency): Mildly thick (Nectar-thick)        8. Patient's progress in rehabilitation and medical issues discussed with the patient. All questions answered to the best of my ability. Care plan discussed with patient, wife and nurse.       Signed:    Franki Belle MD    August 29, 2019

## 2019-08-29 NOTE — PROGRESS NOTES
Problem: Dysphagia (Adult)  Goal: *Speech Goal: (INSERT TEXT)  Description  Long term goals  Patient will:  1. Tolerate regular diet with thin liquids using safe swallowing techniques without s/s of aspiration in 5/6 meals. 2. Use safe swallowing techniques (including slowed rate, small bites/sips, alternate liquids/solids, effortful swallow, head rotation to right for liquids) with supervision. 3. Participate in MBS study prior to advancing to thin liquids to assure safety (no aspiration). 4. Perform oral and pharyngeal strengthening exercises (to improve speech and swallowing) with supervision-modified independence. 5. Demonstrate 90% intelligibility in conversation using appropriate speaking strategies (slowed rate, overarticulation, increased volume). 6. Recall 3 words after 5 minutes with supervision. 7. Perform functional problem solving/reasoning tasks with 90% accuracy. 8. Name 8-10 items in categories of increasing abstraction, supervision. Short term goals (by 8/30/19)  Patient will:   1. Tolerate soft diet with nectar thick liquids using safe swallowing techniques without s/s of aspiration in 5/6 meals. 2. Use safe swallowing techniques (including slowed rate, small bites/sips, alternate liquids/solids, effortful swallow, head rotation to right for liquids) with min cues. 3. Tolerate small amounts of ice chips and water with the SLP using head rotation to the right and/or chin tuck, without overt s/s of aspiration. 4. Perform oral and pharyngeal strengthening exercises (to improve speech and swallowing) with min assist.  5. Demonstrate 90% intelligibility in polysyllabic words and sentences using appropriate speaking strategies (slowed rate, overarticulation, increased volume). 6. Recall 3 words after 5 minutes with mod assist.  7. Perform functional problem solving/reasoning tasks with 75-85% accuracy. 8. Name 8-10 items in concrete categories, supervision.       Note:   SPEECH LANGUAGE PATHOLOGY TREATMENT    Patient: Kitty Ricci (89 y.o. male)  Date: 8/29/2019  Diagnosis: Acute ischemic stroke [facility]) [I63.9] Acute ischemic stroke ([facility] [address]       Time in: 0840 1130 1230  Time Out:  [id number]  SUBJECTIVE:   Patient stated I forgot to do it that time. OBJECTIVE:   Mental Status:  Mr. Sánchez Pérez was awake and alert. However, he continues to demonstrate significant impulsivity. Treatment & Interventions:   Patient was seen in the dining room at mealtime for breakfast and lunch today. He continues to want to eat quite quickly and needs cues to slow his rate. When he knows that he is being monitored, he now takes bites/sips which are a bit smaller. However, if SLP needs to assist another patient, he does not consistently follow identified techniques, as observed this am.  This afternoon, his wife also provided cuing with the meal.  He ate quickly, but was more complaint with reduced bolus size. Throat clearing noted with breakfast (after drinking nectar thick juice). He did well with his lunch. Mr. Sánchez Pérez was also seen for a thirty minute speech therapy session this morning. The following treatment tasks were presented: Motor Speech:   5 syllable words: 90% accuracy  Trials with ice chips: No overt s/s of aspiration with small amounts  Sips of water:  Throat clearing x 3; Cough x 1 bolus     Patient at times swallowing without head rotation even with cues    Neuro-Linguistics:   Orientation:  Independent  Recent memory: Min assist  Reasoning tasks: 75% accuracy/appropriate responses    Response & Tolerance to Activities:  Mr. Sánchez Pérez continues to need close supervision and min-mod cues at mealtime for safe swallowing strategies. He is impulsive and today didn't use head rotation right for several sips of water despite verbal and visual cues.         Pain:  Pain Scale 1: Numeric (0 - 10)  No report of pain     After treatment:   x       Patient left in no apparent distress sitting up in chair  ? Patient left in no apparent distress in bed  ? Call bell left within reach  ? Nursing notified  x       Caregiver present  ? Bed alarm activated    ASSESSMENT:   Progression toward goals:  ?       Improving appropriately and progressing toward goals  x       Improving slowly and progressing toward goals  ? Not making progress toward goals and plan of care will be adjusted    PLAN:   Patient continues to benefit from skilled intervention to address the above impairments. Continue treatment per established plan of care.   Discharge Recommendations:  [facility]    Estimated LOS: Through 8/6/19    SILVERIO Person  Time Calculation:  105 Minutes

## 2019-08-29 NOTE — PROGRESS NOTES
Problem: Self Care Deficits Care Plan (Adult)  Goal: *Therapy Goal (Edit Goal, Insert Text)  Description  Occupational Therapy Goals   Long Term Goals  Initiated 8/16/2019 and to be accomplished within 4 week(s) (9/6/19)  1. Pt will perform self-feeding with MI.  2. Pt will perform grooming with Set-up. 3. Pt will perform UB bathing with SBA. 4. Pt will perform LB bathing with Luba/CGA. 5. Pt will perform tub/shower transfer with Luba/CGA using LRAD. 6. Pt will perform UB dressing with Set-up. 7. Pt will perform LB dressing with Luba/CGA. 8. Pt will perform toileting task with Luba/CGA. 9. Pt will perform toilet transfer with Luba/CGA using LRAD. Short Term Goals   Initiated 8/16/2019 and to be accomplished within 7 day(s) (Updated 8/23, to be met by 8/30/19)  1. Pt will perform self-feeding with S; using compensatory strategies for right hand incorporation into set-up. (GM)  2. Pt will perform simple grooming task, including denture mgmt, with Luba and compensatory strategies as needed. (GM; washing face- pt reporting spouse previously assisted with denture mgmt)  3. Pt will perform UB bathing with Luba using AE as needed. (GM)  4. Pt will perform LB bathing with ModA using AE and compensatory strategies as needed. (GM)  5. Pt will perform tub/shower transfer with modA x 1 <>TTB. (GM)  6. Pt will perform UB dressing with CGA. (Cont for consistency)  7. Pt will perform LB dressing with ModA using AE as needed. (GM)  8. Pt will perform toileting task with ModA. (GM)  9. Pt will perform toilet transfer with ModA x 1 <>3:1 commode. (GM)      Outcome: Progressing Towards Goal     Problem: Self Care Deficits Care Plan (Adult)  Goal: *Therapy Goal (Edit Goal, Insert Text)  Description  Occupational Therapy Goals   Long Term Goals  Initiated 8/16/2019 and to be accomplished within 4 week(s) (9/6/19)  1. Pt will perform self-feeding with MI.  2. Pt will perform grooming with Set-up.   3. Pt will perform UB bathing with SBA. 4. Pt will perform LB bathing with Luba/CGA. 5. Pt will perform tub/shower transfer with Luba/CGA using LRAD. 6. Pt will perform UB dressing with Set-up. 7. Pt will perform LB dressing with Luba/CGA. 8. Pt will perform toileting task with Luba/CGA. 9. Pt will perform toilet transfer with Luba/CGA using LRAD. Short Term Goals   Initiated 8/16/2019 and to be accomplished within 7 day(s) (Updated 8/23, to be met by 8/30/19)  1. Pt will perform self-feeding with S; using compensatory strategies for right hand incorporation into set-up. (GM)  2. Pt will perform simple grooming task, including denture mgmt, with Luba and compensatory strategies as needed. (GM; washing face- pt reporting spouse previously assisted with denture mgmt)  3. Pt will perform UB bathing with Luba using AE as needed. (GM)  4. Pt will perform LB bathing with ModA using AE and compensatory strategies as needed. (GM)  5. Pt will perform tub/shower transfer with modA x 1 <>TTB. (GM)  6. Pt will perform UB dressing with CGA. (Cont for consistency)  7. Pt will perform LB dressing with ModA using AE as needed. (GM)  8. Pt will perform toileting task with ModA. (GM)  9. Pt will perform toilet transfer with ModA x 1 <>3:1 commode. (GM)      Outcome: Progressing Towards Goal     Problem: Self Care Deficits Care Plan (Adult)  Goal: *Therapy Goal (Edit Goal, Insert Text)  Description  Occupational Therapy Goals   Long Term Goals  Initiated 8/16/2019 and to be accomplished within 4 week(s) (9/6/19)  1. Pt will perform self-feeding with MI.  2. Pt will perform grooming with Set-up. 3. Pt will perform UB bathing with SBA. 4. Pt will perform LB bathing with Luba/CGA. 5. Pt will perform tub/shower transfer with Luba/CGA using LRAD. 6. Pt will perform UB dressing with Set-up. 7. Pt will perform LB dressing with Luba/CGA. 8. Pt will perform toileting task with Luba/CGA.   9. Pt will perform toilet transfer with Luba/CGA using LRAD.      Short Term Goals   Initiated 2019 and to be accomplished within 7 day(s) (Updated , to be met by 19)  1. Pt will perform self-feeding with S; using compensatory strategies for right hand incorporation into set-up. (GM)  2. Pt will perform simple grooming task, including denture mgmt, with Luba and compensatory strategies as needed. (GM; washing face- pt reporting spouse previously assisted with denture mgmt)  3. Pt will perform UB bathing with Luba using AE as needed. (GM)  4. Pt will perform LB bathing with ModA using AE and compensatory strategies as needed. (GM)  5. Pt will perform tub/shower transfer with modA x 1 <>TTB. (GM)  6. Pt will perform UB dressing with CGA. (Cont for consistency)  7. Pt will perform LB dressing with ModA using AE as needed. (GM)  8. Pt will perform toileting task with ModA. (GM)  9. Pt will perform toilet transfer with ModA x 1 <>3:1 commode. (GM)      Outcome: Progressing Towards Goal   OCCUPATIONAL THERAPY TREATMENT    Patient: Sumeet July   71 y.o. Patient identified with name and : Yes     Date: 2019    First Tx Session  Time In: 0800  Time Out[de-identified] 0900    Second Tx Session  Time In: 1335  Time Out[de-identified] 1832    Diagnosis: Acute ischemic stroke ([facility] [address] [I63.9]   Precautions: Aspiration, NWB  Chart, occupational therapy assessment, plan of care, and goals were reviewed. Pain:  Pt reports 0/10 pain or discomfort prior to treatment. Pt reports 0/10 pain or discomfort post treatment. Intervention Provided: N/A       SUBJECTIVE:   Patient stated I want to go home.     OBJECTIVE DATA SUMMARY:     Pt supine in bed reporting he would like to have a BM prior to shower. Pt without brief fastened, requesting to leave off in prep for toileting. Towel placed on w/c seat and donned socks for pt in prep for transfer. Stand pivot to w/c with gait belt Min A with cues for hand placement as pt attempted to hold on to bedrail t/o transfer.  Toileting with extra time. During shower transfer to B, pt utilized grab bar with gait belt and experienced one LOB as pt moved too quickly and did not tend to therapist's cues. Pt able to self correct given increased cues for right foot placement-right foot twisted under left during transfer. NMRE Daily Assessment   Right UE NMRE  Second session:   Seated in w/c, pt participated in functional e-stim to forearm flexors at L20 x12 mins. Pt challenged to hold/grasp/ knob, however with difficulty d/t smaller size. Pt able to grasp cup and instructed to visualize holding/picking up plastic cup during \"on\" times. Min A for lifting right UE against gravity. Pt then participated in [address] with attempts to have pt complete digital flexion in gravity eliminated position. Pt unable to complete, however noted pt with deep focus on task often hold breath (also noted during e-stim) requiring min cues for relaxing/deep breathing through exercise. PROM digital flexion/extension performed to encourage movement to digits. UPPER BODY BATHING Daily Assessment    Upper Body Bathing  Bathing Assistance, Upper: 4 (Minimal assistance)  Upper Body : Training to use affected extremity as a gross motor assistance(using bath mitt Min A )  Position Performed: Seated in chair  Adaptive Equipment: Bath mitt; Tub bench  Comments: Seated on tub bench, Min A for washing left/right axilla and using right UE as gross stabilizer with bath mitt. Min A for thoroughness of back      LOWER BODY BATHING Daily Assessment    Lower Body Bathing  Bathing Assistance, Lower : 3 (Moderate assistance )  Position Performed: Seated in chair  Adaptive Equipment: Long handled sponge; Tub bench  Comments: Seated on tub bench, pt washed thighs/periarea and used LH sponge for B LEs (crossing feet for right foot).  Pt bends forward with Mod A for thoroughness to wash buttocks/back      TOILETING Daily Assessment    Toileting  Toileting Assistance (FIM Score): 2 (Maximal assistance)(BM hygiene in standing )  Adaptive Equipment: Grab bars;Elevated seat     UPPER BODY DRESSING Daily Assessment    Upper Body Dressing   Dressing Assistance : 4 (Minimal assistance)  Comments: Min A to thread B UEs using karthik technique      LOWER BODY DRESSING Daily Assessment    Lower Body Dressing   Dressing Assistance : 4 (Minimal assistance)  Leg Crossed Method Used: No  Position Performed: Seated in chair;Standing  Adaptive Equipment Used: Sock aid  Comments: Min A to cecily socks over sock aid and on B feet. Using reacher, pt donned right pant leg with setup and left without reacher. Min A to cecily over right hip/buttocks in standing, pt assist to cecily on left side      MOBILITY/TRANSFERS Daily Assessment    Functional Transfers  Toilet Transfer : Stand pivot transfer without device;Grab bars  Amount of Assistance Required: 4 (Minimal assistance)  Tub or Shower Type: Shower  Amount of Assistance Required: 3 (Moderate assistance)(pt with LOB d/t increased pacing; cues to slow down)  Adaptive Equipment: Grab bars; Tub transfer bench       ASSESSMENT:  Pt is making slow progress with ADLs. He continues to require cues to utilize right UE as gross motor assist, with min difficulty understanding instruction. Also min cues for karthik-technique and use of karthik-sock aid. Pt continues to present with right UE scapula/shoulder movement and elbow flexion in gravity eliminated position; no new right UE movement noted. Progression toward goals:  ?          Improving appropriately and progressing toward goals  ? Improving slowly and progressing toward goals  ? Not making progress toward goals and plan of care will be adjusted     PLAN:  Patient continues to benefit from skilled intervention to address the above impairments. Continue treatment per established plan of care.   Discharge Recommendations:  [facility]  Further Equipment Recommendations for Discharge:  Bariatric 3:1 commode Activity Tolerance:  Fair; cues for rest breaks     Estimated LOS: 9/4/19    Please refer to the flowsheet for vital signs taken during this treatment. After treatment:   ?  Patient left in no apparent distress sitting up in chair in dining group  ? Patient left in no apparent distress in bed  ? Call bell left within reach  ? Nursing notified  ? Caregiver present  ? Bed alarm activated    COMMUNICATION/EDUCATION:   ? Home safety education was provided and the patient/caregiver indicated understanding. ? Patient/family have participated as able in goal setting and plan of care. ? Patient/family agree to work toward stated goals and plan of care. ? Patient understands intent and goals of therapy, but is neutral about his/her participation. ? Patient is unable to participate in goal setting and plan of care.       JUSTIN Covington/L

## 2019-08-29 NOTE — ROUTINE PROCESS
SHIFT CHANGE NOTE FOR [facility]    Bedside and Verbal shift change report given to Flaco Taylor, RN (oncoming nurse) by Sally Whitney RN   (offgoing nurse). Report included the following information SBAR, Kardex, MAR and Recent Results. Situation:   Code Status: Full Code   Reason for Admission: CVA  Hospital Day: 14   Problem List:   Hospital Problems  Date Reviewed: 8/29/2019          Codes Class Noted POA    Primary osteoarthritis of right ankle (Chronic) ICD-10-CM: M19.071  ICD-9-CM: 715.17  8/22/2019 Yes    Overview Signed 8/23/2019 11:54 AM by Liliane Khan MD     X-ray of the right ankle (8/22/2019) showed no acute fracture or subluxation; advanced degenerative changes at the tibiotalar joint; old fracture fragment vs. accessory ossicle in the inferior aspect of the lateral malleolus; soft tissue swelling around the ankle.              Vitamin D deficiency (Chronic) ICD-10-CM: E55.9  ICD-9-CM: 268.9  8/19/2019 Yes    Overview Signed 8/19/2019  1:34 PM by Liliane Khan MD     Vitamin D 25-Hydroxy (8/19/2019) = 16.2              Secondary hyperparathyroidism of renal origin ([facility] [address] (Chronic) ICD-10-CM: N25.81  ICD-9-CM: 588.81  8/18/2019 Yes    Overview Signed 8/28/2019  1:24 PM by Liliane Khan MD     PTH (8/18/2019) = 101.7             Leukocytosis ICD-10-CM: U35.260  ICD-9-CM: 288.60  8/16/2019 Yes    Overview Signed 8/16/2019 11:19 AM by Liliane Khan MD     WBC count (8/16/2019) = 17.9             Hypertensive heart and kidney disease without heart failure and with stage 2 chronic kidney disease (Chronic) ICD-10-CM: I13.10, N18.2  ICD-9-CM: 404.90, 585.2  Unknown Yes        Chronic hypokalemia ICD-10-CM: E87.6  ICD-9-CM: 276.8  Unknown Yes    Overview Addendum 8/15/2019 11:31 AM by Liliane Khan MD     Persistent chronic hypokalemia + hypertension; ?primary hyperaldosteronism             Type 2 diabetes mellitus with stage 2 chronic kidney disease (HCC) (Chronic) ICD-10-CM: E11.22, N18.2  ICD-9-CM: 250.40, 585.2  Unknown Yes    Overview Signed 8/15/2019 12:26 PM by Gwen Espinosa MD     HbA1c (8/13/2019) = 6.6             Pure hypercholesterolemia (Chronic) ICD-10-CM: E78.00  ICD-9-CM: 272.0  8/15/2019 Yes    Overview Addendum 8/15/2019  6:27 PM by Gwen Espinosa MD     Lipid profile (8/13/2019) showed , , HDL 42, ; Lipid profile (8/14/2019) showed , , HDL 39, LDL 94             Current use of aspirin ICD-10-CM: Z79.82  ICD-9-CM: V58.66  8/14/2019 Yes        On statin therapy due to risk of future cardiovascular event ICD-10-CM: Z79.899  ICD-9-CM: V58.69  8/14/2019 Yes    Overview Signed 8/15/2019 12:21 PM by wGen sEpinosa MD     On Atorvastatin             Vitamin B12 deficiency anemia (Chronic) ICD-10-CM: D51.9  ICD-9-CM: 281.1  8/14/2019 Yes    Overview Signed 8/19/2019  1:24 PM by Gwen Espinosa MD     Vitamin B12 (8/14/2019) = 208             Iron deficiency anemia (Chronic) ICD-10-CM: D50.9  ICD-9-CM: 280.9  8/14/2019 Yes    Overview Signed 8/19/2019  1:25 PM by Gwen Espinosa MD     Anemia work-up (8/14/2019) showed serum iron 22, TIBC 371, iron % saturation 6, ferritin 34, reticulocyte count 1.4               Refusal of statin medication by patient ICD-10-CM: Z53.29  ICD-9-CM: V64.2  8/13/2019 Yes    Overview Signed 8/15/2019  2:01 PM by Gwen Espinosa MD     As per note of Neurology (Dr. Sumit Atkinson), patient refuses statin agent because of potential side effects.               * (Principal) Acute ischemic stroke [facility]) ICD-10-CM: I63.9  ICD-9-CM: 434.91  8/12/2019 Yes    Overview Signed 8/15/2019 12:17 AM by Gwen Espinosa MD     Acute Ischemic Stroke (acute/early subacute infarct at the left posterior basal ganglia to corona radiata) with residual right hemiparesis, dysphagia and dysarthria             Impaired mobility and ADLs ICD-10-CM: Z74.09  ICD-9-CM: 799.89  8/12/2019 Yes        Received intravenous tissue plasminogen activator (tPA) in emergency department ICD-10-CM: Z92.82  ICD-9-CM: V45.88  8/12/2019 Yes        Right hemiparesis ([facility] [address] ICD-10-CM: G81.91  ICD-9-CM: 342.90  8/12/2019 Yes        Dysphagia ICD-10-CM: R13.10  ICD-9-CM: 787.20  8/12/2019 Yes        Dysarthria ICD-10-CM: R47.1  ICD-9-CM: 784.51  8/12/2019 Yes              Background:   Past Medical History:   Past Medical History:   Diagnosis Date    Acute ischemic stroke ([facility] [address] 8/12/2019    Acute Ischemic Stroke (acute/early subacute infarct at the left posterior basal ganglia to corona radiata) with residual right hemiparesis, dysphagia and dysarthria    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     On Allopurinol    Chronic hypokalemia     Persistent chronic hypokalemia + hypertension; ?primary hyperaldosteronism    CKD (chronic kidney disease) stage 2, GFR 60-89 ml/min 8/15/2019    Current use of aspirin 8/14/2019    Dysarthria 8/12/2019    Dysphagia 8/12/2019    Elevated prostate specific antigen (PSA) 7/21/2011    First degree atrioventricular block by electrocardiogram 8/12/2019    Hypertensive heart and kidney disease without heart failure and with stage 2 chronic kidney disease     Iron deficiency anemia 8/14/2019    Anemia work-up (8/14/2019) showed serum iron 22, TIBC 371, iron % saturation 6, ferritin 34, reticulocyte count 1.4     Obesity, Class I, BMI 30-34.9     Obstructive sleep apnea on CPAP     On statin therapy due to risk of future cardiovascular event 8/14/2019    On Atorvastatin    Primary osteoarthritis of right ankle 8/22/2019    X-ray of the right ankle (8/22/2019) showed no acute fracture or subluxation; advanced degenerative changes at the tibiotalar joint; old fracture fragment vs. accessory ossicle in the inferior aspect of the lateral malleolus; soft tissue swelling around the ankle.     Pure hypercholesterolemia 08/15/2019    Lipid profile (8/13/2019) showed , , HDL 42, ; Lipid profile (8/14/2019) showed , TC 173, HDL 39, LDL 94    Received intravenous tissue plasminogen activator (tPA) in emergency department 8/12/2019    Refusal of statin medication by patient 8/13/2019    As per note of Neurology (Dr. Markos Live), patient refuses statin agent because of potential side effects.  Right hemiparesis ([facility] [address] 8/12/2019    Secondary hyperparathyroidism of renal origin ([facility] [address] 8/18/2019    PTH (8/18/2019) = 101.7    Type 2 diabetes mellitus with stage 2 chronic kidney disease (HCC)     HbA1c (8/13/2019) = 6.6    Vitamin B12 deficiency anemia 8/14/2019    Vitamin B12 (8/14/2019) = 208    Vitamin D deficiency 8/19/2019    Vitamin D 25-Hydroxy (8/19/2019) = 16.2       Patient taking anticoagulants yes Heparin, ASA   Patient has a defibrillator: no     Assessment:   Changes in Assessment throughout shift: No     Patient has central line: no Reasons if yes: Insertion date: Last dressing date:   Patient has Boykin Cath: no Reasons if yes:     Insertion date:     Last Vitals:     Vitals:    08/28/19 2202 08/29/19 0621 08/29/19 1415 08/29/19 1555   BP: 185/72 183/79 151/67 147/66   Pulse: 68 63 66 69   Resp: 18   18   Temp: 97.1 °F (36.2 °C)   98.1 °F (36.7 °C)   SpO2: 96%   96%   Weight:       Height:            PAIN    Pain Assessment    Pain Intensity 1: 0 (08/29/19 1555) Pain Intensity 1: 2 (12/29/14 1105)    Pain Location 1: Shoulder Pain Location 1: Abdomen    Pain Intervention(s) 1: Medication (see MAR) Pain Intervention(s) 1: Medication (see MAR)  Patient Stated Pain Goal: 0 Patient Stated Pain Goal: 0  o Intervention effective: no    o Other actions taken for pain:      Skin Assessment  Skin color Skin Color: Appropriate for ethnicity  Condition/Temperature Skin Condition/Temp: Warm  Integrity Skin Integrity: Intact  Turgor Turgor: Epidermis thin w/ loss of subcut tissue  Weekly Pressure Ulcer Documentation  Pressure  Injury Documentation: No Pressure Injury Noted-Pressure Ulcer Prevention Initiated  Wound Prevention & Protection Methods  Orientation of wound Orientation of Wound Prevention: Posterior  Location of Prevention Location of Wound Prevention: Buttocks, Sacrum/Coccyx  Dressing Present Dressing Present : Intact, not due to be changed  Dressing Status Dressing Status: Intact  Wound Offloading Wound Offloading (Prevention Methods): Adaptive equipment, Bed, pressure reduction mattress, Chair cushion, Pillows, Repositioning, Turning, Wheelchair     INTAKE/OUPUT  Date 08/28/19 1900 - 08/29/19 0659 08/29/19 0700 - 08/30/19 0659   Shift 3909-0893 24 Hour Total 0826-2907 9612-3651 24 Hour Total   INTAKE   Shift Total(mL/kg)        OUTPUT   Urine(mL/kg/hr)          Urine Occurrence(s) 4 x 8 x 2 x  2 x   Emesis/NG output          Emesis Occurrence(s) 0 x 0 x      Stool          Stool Occurrence(s) 0 x 1 x 2 x  2 x   Shift Total(mL/kg)        NET        Weight (kg) 102 102 102 102 102       Recommendations:  1. Patient needs and requests:     2. Diet: Cardiac Mech Soft Nectar Thick     3. Pending tests/procedures:      4. Functional Level/Equipment:     5. Estimated Discharge Date: 8/24/19 Posted on Whiteboard in Patients Room: yes     HEALS Safety Check    A safety check occurred in the patient's room between off going nurse and oncoming nurse listed above.     The safety check included the below items  Area Items   H  High Alert Medications - Verify all high alert medication drips (heparin, PCA, etc.)   E  Equipment - Suction is set up for ALL patients (with yanker)  - Red plugs utilized for all equipment (IV pumps, etc.)  - WOWs wiped down at end of shift.  - Room stocked with oxygen, suction, and other unit-specific supplies   A  Alarms - Bed alarm is set for fall risk patients  - Ensure chair alarm is in place and activated if patient is up in a chair   L  Lines - Check IV for any infiltration  - Boykin bag is empty if patient has a Boykin   - Tubing and IV bags are labeled   S  Safety   - Room is clean, patient is clean, and equipment is clean. - Hallways are clear from equipment besides carts. - Fall bracelet on for fall risk patients  - Ensure room is clear and free of clutter  - Suction is set up for ALL patients (with diann)  - Hallways are clear from equipment besides carts.    - Isolation precautions followed, supplies available outside room, sign posted

## 2019-08-29 NOTE — PROGRESS NOTES
Problem: Mobility Impaired (Adult and Pediatric)  Goal: *Acute Goals and Plan of Care (Insert Text)  Description  Physical Therapy Short Term Goals  Initiated 2019 and to be accomplished within 14 day(s)  1. Patient will move from supine to sit and sit to supine  in bed with minimal assistance/contact guard assist.     2.  Patient will transfer from bed to chair and chair to bed with minimal/moderate assistance using the least restrictive device. 3.  Patient will perform sit to stand with minimal assistance/moderate assistance. 4.  Patient will ambulate with moderate assistance  for 10 feet with the least restrictive device. Physical Therapy Goals  Initiated 2019 and to be accomplished within 4 weeks  1. Patient will move from supine to sit and sit to supine  in bed with supervision/set-up. 2.  Patient will transfer from bed to chair and chair to bed with supervision/set-up using the least restrictive device. 3.  Patient will perform sit to stand with supervision/set-up. 4.  Patient will ambulate with minimal assistance for 50 feet with the least restrictive device. 5.  Patient will ascend/descend 2 stairs with 1 handrail(s) with moderate assistance . Outcome: Progressing Towards Goal   PHYSICAL THERAPY TREATMENT    Patient: Asha Babin (16 y.o. male)  Date: 2019  Diagnosis: Acute ischemic stroke [facility]) [I63.9] Acute ischemic stroke [facility])  Precautions: Aspiration, NWB  Chart, physical therapy assessment, plan of care and goals were reviewed. Time In: 0930  Time Out: 1030  Patient Seen For: Gait training;Transfer training; Therapeutic exercise;Patient education;Balance activities  Time In: 1200  Time Out: 1230  Pain:  Pt pain was reported as  0 pre-treatment. Pt pain was reported as 0 post-treatment. Intervention: n/a    Patient identified with name and :yes    SUBJECTIVE:     \"I'm not doing as good today. \"    OBJECTIVE DATA SUMMARY:   Objective:     GROSS ASSESSMENT Daily Assessment            BED/MAT MOBILITY Daily Assessment    Supine to Sit : 4 (Contact guard assistance)  Sit to Supine : 4 (Minimal assistance)(to assist with right LE)  Assistance needed during supine to sit in order to raise trunk fully       TRANSFERS Daily Assessment    Other: stand step without assistive device  Transfer Assistance : 4 (Minimal assistance)  Sit to Stand Assistance: Minimal assistance(to mod A as fatigue set in)    Increased cues required during transfers to slow down and focus on quality of movement with mat to w/c transfers. Performed 3 sets of 3 repetitions with sit to stand transfer from w/c while squeezing a ball between his knees; pt initially required CGA and increased time however as he fatigued required mod A. GAIT Daily Assessment    Amount of Assistance: 4 (Minimal assistance)  Distance (ft): 20 Feet (ft)  Assistive Device: Gait belt;Cane, quad    Increased knee hyperextension during stance phase on the right       STEPS or STAIRS Daily Assessment            BALANCE Daily Assessment    Sitting - Static: Good (unsupported)  Standing - Static: (fair-)       WHEELCHAIR MOBILITY Daily Assessment            THERAPEUTIC EXERCISES Daily Assessment    Extremity: Right  Exercise Type #1: Seated lower extremity strengthening  Sets Performed: 2  Reps Performed: 10  Level of Assist: Contact guard assistance  Exercise Type #2: Supine lower extremity strengthening  Sets Performed: 2  Reps Performed: 10  Level of Assist: Contact guard assistance       Neuro Re-Education:  Right foot placed on 2 inch step and contraction of quadriceps and gluteals initiated with weight shift to the right as if he was stepping up onto a step but left LE remained on/close to the floor; required min/mod A to maintain balance during this activity    ASSESSMENT:  Pt required increased assistance to stabilize right LE during ambulation.   Progression toward goals:  X      Improving appropriately and progressing toward goals  ? Improving slowly and progressing toward goals  ? Not making progress toward goals and plan of care will be adjusted     PLAN:  Patient continues to benefit from skilled intervention to address the above impairments. Continue treatment per established plan of care. Discharge Recommendations:  [facility]  Further Equipment Recommendations for Discharge:  N/A     Estimated LOS: d/c 9/6/19    Activity Tolerance:   fair  Please refer to the flowsheet for vital signs taken during this treatment.     After treatment:   Patient left sitting in w/c in the room      Tangela, PT  8/29/2019

## 2019-08-29 NOTE — PROGRESS NOTES
Progress Note  Subjective   Requesting to remove his iv access          69y M with DM, HTN, CKD, admitted to rehab after stroke, seen for CKD  Impression & Plan:   IMPRESSION:   Chronic kidney disease stage III with heavy proteinuria, diabetic nephropathy  Proteinuria, diabetes related, not on any antiproteinuric medication   Hypertension, well controlled  Diabetes  Recent acute ischemic stroke  Chronic hypokalemia, on spiranolactone  Anemia  Secondary hyperparathyroidism   PLAN:   Will give last dose of IV venofer for now, as requesting to remove IV access. Restart oral iron supplement. Adjust medication for current renal function status.                 Current Facility-Administered Medications   Medication Dose Route Frequency    labetalol (NORMODYNE) tablet 600 mg  600 mg Oral Q12H    hydrALAZINE (APRESOLINE) tablet 75 mg  75 mg Oral Q8H    iron sucrose (VENOFER) 200 mg in 0.9% sodium chloride 100 mL IVPB  200 mg IntraVENous Q24H    baclofen (LIORESAL) tablet 10 mg  10 mg Oral TID    spironolactone (ALDACTONE) tablet 50 mg  50 mg Oral BID    cyanocobalamin tablet 1,000 mcg  1,000 mcg Oral DAILY    cholecalciferol (VITAMIN D3) capsule 5,000 Units  5,000 Units Oral DAILY    isosorbide dinitrate (ISORDIL) tablet 30 mg  30 mg Oral Q8H    hydrALAZINE (APRESOLINE) tablet 25 mg  25 mg Oral TID PRN    acetaminophen (TYLENOL) tablet 650 mg  650 mg Oral Q4H PRN    bisacodyl (DULCOLAX) tablet 10 mg  10 mg Oral Q48H PRN    heparin (porcine) injection 5,000 Units  5,000 Units SubCUTAneous Q8H    insulin lispro (HUMALOG) injection   SubCUTAneous TIDAC    aspirin chewable tablet 81 mg  81 mg Oral DAILY WITH BREAKFAST    amLODIPine (NORVASC) tablet 10 mg  10 mg Oral DAILY    atorvastatin (LIPITOR) tablet 80 mg  80 mg Oral QHS       Review of Systems:     As above   Data Review:    Labs: Results:       Chemistry Recent Labs     08/29/19  0606 08/28/19  0609   GLU 91 88    140   K 4.1 4.6    108 CO2 24 24   BUN 36* 37*   CREA 1.54* 1.69*   CA 8.7 8.3*   AGAP 9 8   BUCR 23* 22*      CBC w/Diff Recent Labs     08/29/19  0606   HGB 8.0*   HCT 25.5*         Coagulation No results for input(s): PTP, INR, APTT in the last 72 hours. No lab exists for component: INREXT, INREXT    Iron/Ferritin No results for input(s): IRON in the last 72 hours. No lab exists for component: TIBCCALC   BNP No results for input(s): BNPP in the last 72 hours. Cardiac Enzymes No results for input(s): CPK, CKND1, DANY in the last 72 hours. No lab exists for component: CKRMB, TROIP   Liver Enzymes No results for input(s): TP, ALB, TBIL, AP, SGOT, GPT in the last 72 hours.     No lab exists for component: DBIL   Thyroid Studies Lab Results   Component Value Date/Time    TSH 4.63 (H) 08/19/2019 06:55 AM         EKG: sinus     Physical Assessment:     Visit Vitals  /66 (BP 1 Location: Left arm, BP Patient Position: At rest)   Pulse 69   Temp 98.1 °F (36.7 °C)   Resp 18   Ht 5' 9\" (1.753 m)   Wt 102 kg (224 lb 14.4 oz)   SpO2 96%   BMI 33.21 kg/m²     Weight change:   No intake or output data in the 24 hours ending 08/29/19 1730  Physical Exam:   General: comfortable, no acute distress   HEENT sclera anicteric, supple neck, no thyromegaly  CVS: S1S2 heard,  no rub  RS: + air entry b/l,   Abd: Soft, Non tender, Not distended,   Neuro: non focal, awake, alert , CN II-XII are grossly intact  Extrm: + edema, no cyanosis, clubbing   Skin: no visible  Rash  Musculoskeletal: No gross joints or bone deformities     Procedures/imaging: see electronic medical records for all procedures, Xrays and details which were not copied into this note but were reviewed prior to creation of Adina Mcclellan MD  August 29, 2019  [facility]  Office [phone]

## 2019-08-30 LAB
GLUCOSE BLD STRIP.AUTO-MCNC: 115 MG/DL (ref 70–110)
GLUCOSE BLD STRIP.AUTO-MCNC: 119 MG/DL (ref 70–110)
GLUCOSE BLD STRIP.AUTO-MCNC: 121 MG/DL (ref 70–110)
GLUCOSE BLD STRIP.AUTO-MCNC: 125 MG/DL (ref 70–110)

## 2019-08-30 PROCEDURE — 97110 THERAPEUTIC EXERCISES: CPT

## 2019-08-30 PROCEDURE — 92526 ORAL FUNCTION THERAPY: CPT

## 2019-08-30 PROCEDURE — 74011250636 HC RX REV CODE- 250/636: Performed by: INTERNAL MEDICINE

## 2019-08-30 PROCEDURE — 97112 NEUROMUSCULAR REEDUCATION: CPT

## 2019-08-30 PROCEDURE — 74011250637 HC RX REV CODE- 250/637: Performed by: INTERNAL MEDICINE

## 2019-08-30 PROCEDURE — 65310000000 HC RM PRIVATE REHAB

## 2019-08-30 PROCEDURE — 92507 TX SP LANG VOICE COMM INDIV: CPT

## 2019-08-30 PROCEDURE — 97116 GAIT TRAINING THERAPY: CPT

## 2019-08-30 PROCEDURE — 97530 THERAPEUTIC ACTIVITIES: CPT

## 2019-08-30 PROCEDURE — 82962 GLUCOSE BLOOD TEST: CPT

## 2019-08-30 RX ADMIN — HEPARIN SODIUM 5000 UNITS: 5000 INJECTION INTRAVENOUS; SUBCUTANEOUS at 14:41

## 2019-08-30 RX ADMIN — BACLOFEN 10 MG: 10 TABLET ORAL at 12:28

## 2019-08-30 RX ADMIN — HYDRALAZINE HYDROCHLORIDE 75 MG: 50 TABLET, FILM COATED ORAL at 21:23

## 2019-08-30 RX ADMIN — SPIRONOLACTONE 50 MG: 25 TABLET ORAL at 20:49

## 2019-08-30 RX ADMIN — ISOSORBIDE DINITRATE 30 MG: 20 TABLET ORAL at 21:23

## 2019-08-30 RX ADMIN — BACLOFEN 10 MG: 10 TABLET ORAL at 06:05

## 2019-08-30 RX ADMIN — BACLOFEN 10 MG: 10 TABLET ORAL at 17:37

## 2019-08-30 RX ADMIN — HYDRALAZINE HYDROCHLORIDE 75 MG: 50 TABLET, FILM COATED ORAL at 14:41

## 2019-08-30 RX ADMIN — ISOSORBIDE DINITRATE 30 MG: 20 TABLET ORAL at 06:05

## 2019-08-30 RX ADMIN — ASPIRIN 81 MG 81 MG: 81 TABLET ORAL at 08:16

## 2019-08-30 RX ADMIN — AMLODIPINE BESYLATE 10 MG: 10 TABLET ORAL at 08:16

## 2019-08-30 RX ADMIN — LABETALOL HYDROCHLORIDE 600 MG: 200 TABLET, FILM COATED ORAL at 06:03

## 2019-08-30 RX ADMIN — HEPARIN SODIUM 5000 UNITS: 5000 INJECTION INTRAVENOUS; SUBCUTANEOUS at 21:24

## 2019-08-30 RX ADMIN — FERROUS SULFATE TAB 325 MG (65 MG ELEMENTAL FE) 325 MG: 325 (65 FE) TAB at 08:16

## 2019-08-30 RX ADMIN — ATORVASTATIN CALCIUM 80 MG: 40 TABLET, FILM COATED ORAL at 20:48

## 2019-08-30 RX ADMIN — CYANOCOBALAMIN TAB 1000 MCG 1000 MCG: 1000 TAB at 08:16

## 2019-08-30 RX ADMIN — SPIRONOLACTONE 50 MG: 25 TABLET ORAL at 08:16

## 2019-08-30 RX ADMIN — HEPARIN SODIUM 5000 UNITS: 5000 INJECTION INTRAVENOUS; SUBCUTANEOUS at 06:03

## 2019-08-30 RX ADMIN — HYDRALAZINE HYDROCHLORIDE 75 MG: 50 TABLET, FILM COATED ORAL at 06:06

## 2019-08-30 RX ADMIN — LABETALOL HYDROCHLORIDE 600 MG: 200 TABLET, FILM COATED ORAL at 17:37

## 2019-08-30 RX ADMIN — Medication 5000 UNITS: at 08:15

## 2019-08-30 RX ADMIN — ISOSORBIDE DINITRATE 30 MG: 20 TABLET ORAL at 14:41

## 2019-08-30 NOTE — PROGRESS NOTES
Problem: Mobility Impaired (Adult and Pediatric)  Goal: *Acute Goals and Plan of Care (Insert Text)  Description  Physical Therapy Short Term Goals  Initiated 8/16/2019 and to be accomplished within 14 day(s)  1. Patient will move from supine to sit and sit to supine  in bed with minimal assistance/contact guard assist. -met 8/30/19  2. Patient will transfer from bed to chair and chair to bed with minimal/moderate assistance using the least restrictive device. - met 8/30/19  3. Patient will perform sit to stand with minimal assistance/moderate assistance. - met 8/30/19  4. Patient will ambulate with moderate assistance  for 10 feet with the least restrictive device. -met 8/30/19    Physical Therapy Short Term Goals  Initiated 8/30/2019 and to be accomplished within 7 day(s)  1. Patient will move from supine to sit and sit to supine  in bed with supervision/set-up. 2.  Patient will transfer from bed to chair and chair to bed with minimal assistance/contact guard assist using the least restrictive device. 3.  Patient will perform sit to stand with minimal assistance/contact guard assist from w/c height. 4.  Patient will ambulate with minimal assistance/contact guard assist for 60 feet with the least restrictive device. Physical Therapy Goals  Initiated 8/16/2019 and to be accomplished within 4 weeks  1. Patient will move from supine to sit and sit to supine  in bed with supervision/set-up. 2.  Patient will transfer from bed to chair and chair to bed with supervision/set-up using the least restrictive device. 3.  Patient will perform sit to stand with supervision/set-up. 4.  Patient will ambulate with minimal assistance for 50 feet with the least restrictive device. 5.  Patient will ascend/descend 2 stairs with 1 handrail(s) with moderate assistance .         8/30/2019 1541 by Maye Moody PT  Outcome: Progressing Towards Goal   PHYSICAL THERAPY WEEKLY PROGRESS NOTE    Patient: Dai Roger Gabriel (71 y.o. male)  Date: 2019  Diagnosis: Acute ischemic stroke [facility]) [I63.9] Acute ischemic stroke [facility])  Precautions: Aspiration, NWB  Chart, physical therapy assessment, plan of care and goals were reviewed. Pain:  Pt pain was reported as  0 pre-treatment. Pt pain was reported as 0 post-treatment. Intervention: n/a    Time in:0930  Time out:1030  Time in: 1330  Time out: 0    Pt seen for:gait training, transfer training, balance activities, therapeutic exercises    Patient identified with name and :yes    SUBJECTIVE:     \"I don't know why I'm doing bad. \"    OBJECTIVE DATA SUMMARY:   AROM:     FIM SCORES Initial Assessment Weekly Progress Assessment 2019   Bed/Chair/Wheelchair Transfers 2 3   Wheelchair Mobility 5 5   Walking [place] 1 1   Steps/Stairs 0 0   Please see [facility] Interdisciplinary Eval: Coordination/Balance Section for details regarding FIM score description.     BED/CHAIR/WHEELCHAIR TRANSFERS Initial Assessment Weekly Progress Assessment 2019   Rolling Right 6 (Modified independent) 4 (Minimal assistance)   Rolling Left 3 (Moderate assistance ) 4 (Minimal assistance)   Supine to Sit 3 (Moderate assistance) 4 (Contact guard assistance)   Sit to Stand Maximum assistance Moderate assistance(occasionally min)   Sit to Supine 3 (Moderate assistance) 4 (Minimal assistance)   Transfer Type (stand step without assistive device)  Stand step without assistive device   Comments       Car Transfer       Car Type         GROSS ASSESSMENT Weekly Progress Assessment 2019   AROM  decreased right LE, WFL left LE   Strength  Right LE 2+/5 hip and knee 0/5 ankle   Coordination  Generally decreased functional   Tone  abnormal   Sensation     PROM       POSTURE Weekly Progress Assessment 2019   Posture (WDL)     Posture Assessment       WHEELCHAIR MOBILITY/MANAGEMENT Initial Assessment Weekly Progress Assessment 2019   Able to Propel 300 feet 300 feet   Curbs/ramps assistance required 0 (Not tested)  NT   Wheelchair set up assistance required 3 (Moderate assistance)(for right brake and leg rest)  4 assistance for right leg rest   Wheelchair management Manages left brake Manages right brake;Manages left brake     WALKING INDEPENDENCE Initial Assessment Weekly Progress Assessment 8/30/2019   Assistive device Cane, quad Gait belt;Cane, quad   Ambulation assistance - level surface    Min A for balance however occasional mod A for loss of balance   Distance 3 Feet (ft) 34 Feet (ft)   Comments (3 feet max A with wide base quad cane) Narrow base of support, assistance with weight shift, verbal and tactile cues to initiate contractions in right quad and glutes     GAIT Weekly Progress Assessment 8/30/2019   Gait Description (WDL)     Gait Abnormalities Decreased step clearance; Ataxic; Step to gait;Hip Hike     STEPS/STAIRS Initial Assessment Weekly Progress Assessment 8/30/2019   Steps/Stairs ambulated       Solutionreach Use       Comments       Curbs/Ramps           Neuro Re-Education:  Static standing without UE support with mirror for visual feedback; verbal and tactile cues for right hip and knee extension and weight shifting onto the right. Pt is compensating for weight shifting with trunk rotation. During sit to stand transfer patient required facilitation on right LE at hamstring and distal femur in order to achieve full standing without max A. Standing reaching across the body to the right and forward to get cones and then transfer them back to his left side to facilitate weight shifting and R LE stability, pt required min/mod A to maintain balance with this activity. Standing and reaching forward with feet in a staggered stance position with right LE leading, shifting weight forward onto the right LE while reaching to increase weight bearing and stabilization of right LE, pt required mod A for balance.        ASSESSMENT:  Pt has progressed and met original short term goals which were revised today. Pt requires continued cues to perform slow and controlled movement. Pt requires continued encouragement to see the progress that he is making in therapy, and reminders that recovery takes time. Progression toward goals:  X      Improving appropriately and progressing toward goals  ? Improving slowly and progressing toward goals  ? Not making progress toward goals and plan of care will be adjusted     PLAN:  Patient continues to benefit from skilled intervention to address the above impairments. Continue treatment per established plan of care. Discharge Recommendations:  [facility]  Further Equipment Recommendations for Discharge:  N/A     Estimated LOS:d/c 9/6/19    Activity Tolerance:   fair  Please refer to the flowsheet for vital signs taken during this treatment. After treatment:   ? Patient left in no apparent distress in bed  ? Patient left in no apparent distress sitting up in chair  X Patient left in no apparent distress in w/c mobilizing under own power  ? Patient left in no apparent distress dining area  ? Patient left in no apparent distress mobilizing under own power  ? Call bell left within reach  ? Nursing notified  ? Caregiver present  ?  Bed alarm activated       Ashley Shaw, PT  8/30/2019

## 2019-08-30 NOTE — PROGRESS NOTES
Problem: Self Care Deficits Care Plan (Adult)  Goal: *Therapy Goal (Edit Goal, Insert Text)  Description  Occupational Therapy Goals   Long Term Goals  Initiated 2019 and to be accomplished within 4 week(s) (19)  1. Pt will perform self-feeding with MI.  2. Pt will perform grooming with Set-up. 3. Pt will perform UB bathing with SBA. 4. Pt will perform LB bathing with Luba/CGA. 5. Pt will perform tub/shower transfer with Luba/CGA using LRAD. 6. Pt will perform UB dressing with Set-up. 7. Pt will perform LB dressing with Luba/CGA. 8. Pt will perform toileting task with Luba/CGA. 9. Pt will perform toilet transfer with Luba/CGA using LRAD. Short Term Goals   Initiated 2019 and to be accomplished within 7 day(s) (Updated , to be met by 19)  1. Pt will perform self-feeding with S; using compensatory strategies for right hand incorporation into set-up. (GM)  2. Pt will perform simple grooming task, including denture mgmt, with Luba and compensatory strategies as needed. (GM; washing face- pt reporting spouse previously assisted with denture mgmt)  3. Pt will perform UB bathing with Luba using AE as needed. (GM)  4. Pt will perform LB bathing with ModA using AE and compensatory strategies as needed. (GM)  5. Pt will perform tub/shower transfer with modA x 1 <>TTB. (GM)  6. Pt will perform UB dressing with CGA. (Cont for consistency)  7. Pt will perform LB dressing with ModA using AE as needed. (GM)  8. Pt will perform toileting task with ModA. (GM)  9. Pt will perform toilet transfer with ModA x 1 <>3:1 commode. (GM)      Outcome: Progressing Towards Goal   OCCUPATIONAL THERAPY TREATMENT    Patient: [facility]   71 y.o.     Patient identified with name and : Yes     Date: 2019    First Tx Session  Time In: 80  Time Out[de-identified] 1200    Diagnosis: Acute ischemic stroke [facility]) [I63.9]   Precautions: Aspiration, NWB  Chart, occupational therapy assessment, plan of care, and goals were reviewed. Pain:  Pt reports 0/10 pain or discomfort prior to treatment. Pt reports 0/10 pain or discomfort post treatment. Intervention Provided: N/A       SUBJECTIVE:   Patient stated Rishabh Erickson is in [facility] [place] [address].  (re: his wife)    OBJECTIVE DATA SUMMARY:     Pt completed STS from w/c in prep for transfer from w/c > mat with much difficulty this session. PT required to step in to assist as pt unable to give correct anterior weight shift and weight through right LE without facilitation. Stand-step transfer w/c > mat with Mod A using no AD and min cues for right foot placement. End of session, pt transferred back to w/c with same assistance as above. THERAPEUTIC EXERCISE Daily Assessment   Core strengthening/trunk control therex in prep for ADLs Seated edge of mat, pt utilized large red swiss ball to facilitate increased trunk control/core strengthening with B UEs for shoulder flexion/extension. WBing through right UE on ball with Min A to maintain/proper form. Pt given increased instruction to pull belly button back/up upon upright posture. He completed 10x2 anterior/right and left lateral leans. NMRE Daily Assessment   NMRE of right UE during functional reaching theract  Pt completed WBing through right UE on chair with 1\" step to facilitate proper alignment during functional reach with left UE to pickup/place/remove large peg pattern on slanted board. Mod A for proper form of right UE. Upgraded activity with 2# weight added to left UE for strengthening. ASSESSMENT:  Pt continues to present with difficulty completing STS and stand-step transfers. Pt appears to have somewhat plateaued with therapy and minimally attends to therapists cues. Progression toward goals:  ?          Improving appropriately and progressing toward goals  ? Improving slowly and progressing toward goals  ?           Not making progress toward goals and plan of care will be adjusted     PLAN:  Patient continues to benefit from skilled intervention to address the above impairments. Continue treatment per established plan of care. Discharge Recommendations:  SNF    Further Equipment Recommendations for Discharge:  Bariatric [facility]       Activity Tolerance:  Fair; min rest breaks provided     Estimated LOS: 9/6/2019    Please refer to the flowsheet for vital signs taken during this treatment. After treatment:   ?  Patient left in no apparent distress sitting up in chair   ? Patient left in no apparent distress in bed  ? Call bell left within reach  ? Nursing notified  ? Caregiver present  ? Bed alarm activated    COMMUNICATION/EDUCATION:   ? Home safety education was provided and the patient/caregiver indicated understanding. ? Patient/family have participated as able in goal setting and plan of care. ? Patient/family agree to work toward stated goals and plan of care. ? Patient understands intent and goals of therapy, but is neutral about his/her participation. ? Patient is unable to participate in goal setting and plan of care.       JUSTIN Betancourt/ROVERTO

## 2019-08-30 NOTE — ROUTINE PROCESS
SHIFT CHANGE NOTE FOR [facility]    Bedside and Verbal shift change report given to Eve Raya, RN (oncoming nurse) by Nabeel Mane RN   (offgoing nurse). Report included the following information SBAR, Kardex, MAR and Recent Results. Situation:   Code Status: Full Code   Reason for Admission: CVA  Hospital Day: 15   Problem List:   Hospital Problems  Date Reviewed: 8/30/2019          Codes Class Noted POA    Primary osteoarthritis of right ankle (Chronic) ICD-10-CM: M19.071  ICD-9-CM: 715.17  8/22/2019 Yes    Overview Signed 8/23/2019 11:54 AM by Sandra Barraza MD     X-ray of the right ankle (8/22/2019) showed no acute fracture or subluxation; advanced degenerative changes at the tibiotalar joint; old fracture fragment vs. accessory ossicle in the inferior aspect of the lateral malleolus; soft tissue swelling around the ankle.              Vitamin D deficiency (Chronic) ICD-10-CM: E55.9  ICD-9-CM: 268.9  8/19/2019 Yes    Overview Signed 8/19/2019  1:34 PM by Sandra Barraza MD     Vitamin D 25-Hydroxy (8/19/2019) = 16.2              Secondary hyperparathyroidism of renal origin ([facility] [address] (Chronic) ICD-10-CM: N25.81  ICD-9-CM: 588.81  8/18/2019 Yes    Overview Signed 8/28/2019  1:24 PM by Sandra Barraza MD     PTH (8/18/2019) = 101.7             Leukocytosis ICD-10-CM: F21.897  ICD-9-CM: 288.60  8/16/2019 Yes    Overview Signed 8/16/2019 11:19 AM by Sandra Barraza MD     WBC count (8/16/2019) = 17.9             Hypertensive heart and kidney disease without heart failure and with stage 2 chronic kidney disease (Chronic) ICD-10-CM: I13.10, N18.2  ICD-9-CM: 404.90, 585.2  Unknown Yes        Chronic hypokalemia ICD-10-CM: E87.6  ICD-9-CM: 276.8  Unknown Yes    Overview Addendum 8/15/2019 11:31 AM by Sandra Barraza MD     Persistent chronic hypokalemia + hypertension; ?primary hyperaldosteronism             Type 2 diabetes mellitus with stage 2 chronic kidney disease (HCC) (Chronic) ICD-10-CM: E11.22, N18.2  ICD-9-CM: 250.40, 585.2  Unknown Yes    Overview Signed 8/15/2019 12:26 PM by Jeanne Ricci MD     HbA1c (8/13/2019) = 6.6             Pure hypercholesterolemia (Chronic) ICD-10-CM: E78.00  ICD-9-CM: 272.0  8/15/2019 Yes    Overview Addendum 8/15/2019  6:27 PM by Jeanne Ricci MD     Lipid profile (8/13/2019) showed , , HDL 42, ; Lipid profile (8/14/2019) showed , , HDL 39, LDL 94             Current use of aspirin ICD-10-CM: Z79.82  ICD-9-CM: V58.66  8/14/2019 Yes        On statin therapy due to risk of future cardiovascular event ICD-10-CM: Z79.899  ICD-9-CM: V58.69  8/14/2019 Yes    Overview Signed 8/15/2019 12:21 PM by Jeanne Ricci MD     On Atorvastatin             Vitamin B12 deficiency anemia (Chronic) ICD-10-CM: D51.9  ICD-9-CM: 281.1  8/14/2019 Yes    Overview Signed 8/19/2019  1:24 PM by Jeanne Ricci MD     Vitamin B12 (8/14/2019) = 208             Iron deficiency anemia (Chronic) ICD-10-CM: D50.9  ICD-9-CM: 280.9  8/14/2019 Yes    Overview Signed 8/19/2019  1:25 PM by Jeanne Ricci MD     Anemia work-up (8/14/2019) showed serum iron 22, TIBC 371, iron % saturation 6, ferritin 34, reticulocyte count 1.4               Refusal of statin medication by patient ICD-10-CM: Z53.29  ICD-9-CM: V64.2  8/13/2019 Yes    Overview Signed 8/15/2019  2:01 PM by Jeanne Ricci MD     As per note of Neurology (Dr. Markos Live), patient refuses statin agent because of potential side effects.               * (Principal) Acute ischemic stroke [facility]) ICD-10-CM: I63.9  ICD-9-CM: 434.91  8/12/2019 Yes    Overview Signed 8/15/2019 12:17 AM by Jeanne Ricci MD     Acute Ischemic Stroke (acute/early subacute infarct at the left posterior basal ganglia to corona radiata) with residual right hemiparesis, dysphagia and dysarthria             Impaired mobility and ADLs ICD-10-CM: Z74.09  ICD-9-CM: 799.89  8/12/2019 Yes        Received intravenous tissue plasminogen activator (tPA) in emergency department ICD-10-CM: Z92.82  ICD-9-CM: V45.88  8/12/2019 Yes        Right hemiparesis ([facility] [address] ICD-10-CM: G81.91  ICD-9-CM: 342.90  8/12/2019 Yes        Dysphagia ICD-10-CM: R13.10  ICD-9-CM: 787.20  8/12/2019 Yes        Dysarthria ICD-10-CM: R47.1  ICD-9-CM: 784.51  8/12/2019 Yes              Background:   Past Medical History:   Past Medical History:   Diagnosis Date    Acute ischemic stroke ([facility] [address] 8/12/2019    Acute Ischemic Stroke (acute/early subacute infarct at the left posterior basal ganglia to corona radiata) with residual right hemiparesis, dysphagia and dysarthria    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     On Allopurinol    Chronic hypokalemia     Persistent chronic hypokalemia + hypertension; ?primary hyperaldosteronism    CKD (chronic kidney disease) stage 2, GFR 60-89 ml/min 8/15/2019    Current use of aspirin 8/14/2019    Dysarthria 8/12/2019    Dysphagia 8/12/2019    Elevated prostate specific antigen (PSA) 7/21/2011    First degree atrioventricular block by electrocardiogram 8/12/2019    Hypertensive heart and kidney disease without heart failure and with stage 2 chronic kidney disease     Iron deficiency anemia 8/14/2019    Anemia work-up (8/14/2019) showed serum iron 22, TIBC 371, iron % saturation 6, ferritin 34, reticulocyte count 1.4     Obesity, Class I, BMI 30-34.9     Obstructive sleep apnea on CPAP     On statin therapy due to risk of future cardiovascular event 8/14/2019    On Atorvastatin    Primary osteoarthritis of right ankle 8/22/2019    X-ray of the right ankle (8/22/2019) showed no acute fracture or subluxation; advanced degenerative changes at the tibiotalar joint; old fracture fragment vs. accessory ossicle in the inferior aspect of the lateral malleolus; soft tissue swelling around the ankle.     Pure hypercholesterolemia 08/15/2019    Lipid profile (8/13/2019) showed , , HDL 42, ; Lipid profile (8/14/2019) showed , , HDL 39, LDL 94  Received intravenous tissue plasminogen activator (tPA) in emergency department 8/12/2019    Refusal of statin medication by patient 8/13/2019    As per note of Neurology (Dr. Darrion Mobley), patient refuses statin agent because of potential side effects.  Right hemiparesis ([facility] [address] 8/12/2019    Secondary hyperparathyroidism of renal origin ([facility] [address] 8/18/2019    PTH (8/18/2019) = 101.7    Type 2 diabetes mellitus with stage 2 chronic kidney disease (HCC)     HbA1c (8/13/2019) = 6.6    Vitamin B12 deficiency anemia 8/14/2019    Vitamin B12 (8/14/2019) = 208    Vitamin D deficiency 8/19/2019    Vitamin D 25-Hydroxy (8/19/2019) = 16.2       Patient taking anticoagulants yes Heparin, ASA   Patient has a defibrillator: no     Assessment:   Changes in Assessment throughout shift: No     Patient has central line: no Reasons if yes: Insertion date: Last dressing date:   Patient has Boykin Cath: no Reasons if yes:     Insertion date:     Last Vitals:     Vitals:    08/30/19 0603 08/30/19 0800 08/30/19 1440 08/30/19 1601   BP: 183/80 125/68 147/71 161/78   Pulse: 69 62 (!) 57 72   Resp:  18 18 18   Temp:  97.1 °F (36.2 °C) 98.1 °F (36.7 °C) 97.1 °F (36.2 °C)   SpO2:  97% 100% 97%   Weight:       Height:            PAIN    Pain Assessment    Pain Intensity 1: 0 (08/30/19 1216) Pain Intensity 1: 2 (12/29/14 1105)    Pain Location 1: Shoulder Pain Location 1: Abdomen    Pain Intervention(s) 1: Medication (see MAR) Pain Intervention(s) 1: Medication (see MAR)  Patient Stated Pain Goal: 0 Patient Stated Pain Goal: 0  o Intervention effective: no    o Other actions taken for pain:      Skin Assessment  Skin color Skin Color: Appropriate for ethnicity  Condition/Temperature Skin Condition/Temp: Dry, Warm  Integrity Skin Integrity: Intact  Turgor Turgor: Non-tenting  Weekly Pressure Ulcer Documentation  Pressure  Injury Documentation: No Pressure Injury Noted-Pressure Ulcer Prevention Initiated  Wound Prevention & Protection Methods  Orientation of wound Orientation of Wound Prevention: Posterior  Location of Prevention Location of Wound Prevention: Sacrum/Coccyx  Dressing Present Dressing Present : No  Dressing Status Dressing Status: Intact  Wound Offloading Wound Offloading (Prevention Methods): Bed, pressure reduction mattress     INTAKE/OUPUT  Date 08/29/19 0700 - 08/30/19 0659 08/30/19 0700 - 08/31/19 0659   Shift 0700-1859 1900-0659 24 Hour Total 0700-1859 1900-0659 24 Hour Total   INTAKE   P.O.    240  240     P. O.    240  240   Shift Total(mL/kg)    240(2.4)  240(2.4)   OUTPUT   Urine(mL/kg/hr)  925(0.8) 925(0.4)        Urine Voided  925 925        Urine Occurrence(s) 2 x 5 x 7 x 5 x  5 x   Stool           Stool Occurrence(s) 2 x 0 x 2 x 1 x  1 x   Shift Total(mL/kg)  925(9.1) 925(9.1)      NET  -925 -925 240  240   Weight (kg) 102 102 102 102 102 102       Recommendations:  1. Patient needs and requests:     2. Diet: Cardiac Mech Soft Nectar Thick     3. Pending tests/procedures:      4. Functional Level/Equipment:     5. Estimated Discharge Date: 8/24/19 Posted on Whiteboard in Patients Room: yes     HEALS Safety Check    A safety check occurred in the patient's room between off going nurse and oncoming nurse listed above.     The safety check included the below items  Area Items   H  High Alert Medications - Verify all high alert medication drips (heparin, PCA, etc.)   E  Equipment - Suction is set up for ALL patients (with yanker)  - Red plugs utilized for all equipment (IV pumps, etc.)  - WOWs wiped down at end of shift.  - Room stocked with oxygen, suction, and other unit-specific supplies   A  Alarms - Bed alarm is set for fall risk patients  - Ensure chair alarm is in place and activated if patient is up in a chair   L  Lines - Check IV for any infiltration  - Boykin bag is empty if patient has a Boykin   - Tubing and IV bags are labeled   S  Safety   - Room is clean, patient is clean, and equipment is clean.  - Hallways are clear from equipment besides carts. - Fall bracelet on for fall risk patients  - Ensure room is clear and free of clutter  - Suction is set up for ALL patients (with diann)  - Hallways are clear from equipment besides carts.    - Isolation precautions followed, supplies available outside room, sign posted

## 2019-08-30 NOTE — PROGRESS NOTES
Problem: Dysphagia (Adult)  Goal: *Speech Goal: (INSERT TEXT)  Description  Long term goals  Patient will:  1. Tolerate regular diet with thin liquids using safe swallowing techniques without s/s of aspiration in 5/6 meals. 2. Use safe swallowing techniques (including slowed rate, small bites/sips, alternate liquids/solids, effortful swallow, head rotation to right for liquids) with supervision. 3. Participate in MBS study prior to advancing to thin liquids to assure safety (no aspiration). 4. Perform oral and pharyngeal strengthening exercises (to improve speech and swallowing) with supervision-modified independence. 5. Demonstrate 90% intelligibility in conversation using appropriate speaking strategies (slowed rate, overarticulation, increased volume). 6. Recall 3 words after 5 minutes with supervision. 7. Perform functional problem solving/reasoning tasks with 90% accuracy. 8. Name 8-10 items in categories of increasing abstraction, supervision. Short term goals (by 8/30/19)  Patient will:   1. Tolerate soft diet with nectar thick liquids using safe swallowing techniques without s/s of aspiration in 5/6 meals. 2. Use safe swallowing techniques (including slowed rate, small bites/sips, alternate liquids/solids, effortful swallow, head rotation to right for liquids) with min cues. 3. Tolerate small amounts of ice chips and water with the SLP using head rotation to the right and/or chin tuck, without overt s/s of aspiration. 4. Perform oral and pharyngeal strengthening exercises (to improve speech and swallowing) with min assist.  5. Demonstrate 90% intelligibility in polysyllabic words and sentences using appropriate speaking strategies (slowed rate, overarticulation, increased volume). 6. Recall 3 words after 5 minutes with mod assist.  7. Perform functional problem solving/reasoning tasks with 75-85% accuracy. 8. Name 8-10 items in concrete categories, supervision.       Note:   SPEECH LANGUAGE PATHOLOGY TREATMENT    Patient: Ranulfo Mcclendon (76 y.o. male)  Date: 8/30/2019  Diagnosis: Acute ischemic stroke [facility]) [I63.9] Acute ischemic stroke ([facility] [address]       Time in: 0830 1030 1230 1515  Time Out:  0900 1100 1250 1545  SUBJECTIVE:   Patient stated I was practicing the things on this sheet . OBJECTIVE:   Mental Status:  Mr. Estela Durán was awake and alert in sessions today. He remains quite impulsive which is a concern to all of the therapy team    Treatment & Interventions:   Patient was seen in the dining room today for breakfast and lunch. He continues to take large boluses and does not clear his mouth between boluses despite cues from the SLP. At breakfast this morning, he coughed on the scrambled eggs, with material blown from his mouth by the cough. Mr. Estela Durán also coughed after two boluses nectar thick liquid, despite cues for small sips. He continues to state that he does nor perceive that he is eating too fast.  At lunchtime, patient was given his tray 5 minutes prior to the arrival of the SLP. The CNA instructed him to wait for the therapist prior to beginning to eat. When the SLP arrived 5 minutes later Mr. Estela Durán had eaten more than half of the food on the plate. He denied that he had been asked to wait for the therapist before starting to eat. Again after eating, he demonstrated cough and throat clearing with liquid boluses. Patient receives consistent coaching in safe swallowing techniques, but persists in rapid intake, larger than recommended boluses. Mr. Estela Durán also participated in the weekly CVA support/education group. Patient was an active participant in the group discussion. The conversation focused upon life issues and progress over the last week/ hopes for the coming week. Patient demonstrated rapid speech rate with reduced precision than seen in treatment sessions. In the afternoon treatment session the following therapy tasks were presented:   Motor Speech:   Production of hard /k/:  100% accuracy with periodic cues for speaking strategies  Posterior tongue exercise: Patient had much difficulty raising posterior tongue, persisted in raising the tongue tip  Sheyla Maneuver:  Performed with much effort  Small ice boluses: Tolerated well with head rotation to left for swallow  Small liquid boluses:  Throat clear x 1 with SLP presenting 5cc boluses      Throat clearing and cough with independent sips from the cup      Patient twice did not rotate right for swallow- both times cough ensued. Neuro-Linguistics:   Orientation:   Independent  Recent memory:  Supervision- min cues  Recall 3 words:  Supervision (relatively simple with mnemonics)  Reasoning/Timin% accuracy  Reasoning/Comparin% appropriate responses     Response & Tolerance to Activities:  Mr. Lazarus Seal' impulsivity is a barrier to safe swallowing (even on his altered diet) and reasoning skills. He will often respond without taking time to think things through. Pain:  Pain Scale 1: Numeric (0 - 10)  No report of pain     After treatment:   ?       Patient left in no apparent distress sitting up in chair  ? Patient left in no apparent distress in bed  ? Call bell left within reach  ? Nursing notified  ? Caregiver present  ? Bed alarm activated    ASSESSMENT:   Progression toward goals:  ?       Improving appropriately and progressing toward goals  ? Improving slowly and progressing toward goals  ? Not making progress toward goals and plan of care will be adjusted    PLAN:   Patient continues to benefit from skilled intervention to address the above impairments. Continue treatment per established plan of care.   Discharge Recommendations:  Allen Witt    Estimated LOS: Through 19    SILVERIO Harris  Time Calculation:  110 Minutes

## 2019-08-30 NOTE — ROUTINE PROCESS
SHIFT CHANGE NOTE FOR [facility]    Bedside and Verbal shift change report given to MAURO Reyna (oncoming nurse) by Elyse Frankel, RN   (offgoing nurse). Report included the following information SBAR, Kardex, MAR and Recent Results. Situation:   Code Status: Full Code   Reason for Admission: CVA  Hospital Day: 15   Problem List:   Hospital Problems  Date Reviewed: 8/29/2019          Codes Class Noted POA    Primary osteoarthritis of right ankle (Chronic) ICD-10-CM: M19.071  ICD-9-CM: 715.17  8/22/2019 Yes    Overview Signed 8/23/2019 11:54 AM by Fuentes Frank MD     X-ray of the right ankle (8/22/2019) showed no acute fracture or subluxation; advanced degenerative changes at the tibiotalar joint; old fracture fragment vs. accessory ossicle in the inferior aspect of the lateral malleolus; soft tissue swelling around the ankle.              Vitamin D deficiency (Chronic) ICD-10-CM: E55.9  ICD-9-CM: 268.9  8/19/2019 Yes    Overview Signed 8/19/2019  1:34 PM by Fuentes Frank MD     Vitamin D 25-Hydroxy (8/19/2019) = 16.2              Secondary hyperparathyroidism of renal origin ([facility] [address] (Chronic) ICD-10-CM: N25.81  ICD-9-CM: 588.81  8/18/2019 Yes    Overview Signed 8/28/2019  1:24 PM by Fuentes Frank MD     PTH (8/18/2019) = 101.7             Leukocytosis ICD-10-CM: F31.367  ICD-9-CM: 288.60  8/16/2019 Yes    Overview Signed 8/16/2019 11:19 AM by Fuentes Frank MD     WBC count (8/16/2019) = 17.9             Hypertensive heart and kidney disease without heart failure and with stage 2 chronic kidney disease (Chronic) ICD-10-CM: I13.10, N18.2  ICD-9-CM: 404.90, 585.2  Unknown Yes        Chronic hypokalemia ICD-10-CM: E87.6  ICD-9-CM: 276.8  Unknown Yes    Overview Addendum 8/15/2019 11:31 AM by Fuentes Frank MD     Persistent chronic hypokalemia + hypertension; ?primary hyperaldosteronism             Type 2 diabetes mellitus with stage 2 chronic kidney disease (HCC) (Chronic) ICD-10-CM: E11.22, N18.2  ICD-9-CM: 250.40, 585.2  Unknown Yes    Overview Signed 8/15/2019 12:26 PM by Ami Cole MD     HbA1c (8/13/2019) = 6.6             Pure hypercholesterolemia (Chronic) ICD-10-CM: E78.00  ICD-9-CM: 272.0  8/15/2019 Yes    Overview Addendum 8/15/2019  6:27 PM by Ami Cole MD     Lipid profile (8/13/2019) showed , , HDL 42, ; Lipid profile (8/14/2019) showed , , HDL 39, LDL 94             Current use of aspirin ICD-10-CM: Z79.82  ICD-9-CM: V58.66  8/14/2019 Yes        On statin therapy due to risk of future cardiovascular event ICD-10-CM: Z79.899  ICD-9-CM: V58.69  8/14/2019 Yes    Overview Signed 8/15/2019 12:21 PM by Ami Cole MD     On Atorvastatin             Vitamin B12 deficiency anemia (Chronic) ICD-10-CM: D51.9  ICD-9-CM: 281.1  8/14/2019 Yes    Overview Signed 8/19/2019  1:24 PM by Ami Cole MD     Vitamin B12 (8/14/2019) = 208             Iron deficiency anemia (Chronic) ICD-10-CM: D50.9  ICD-9-CM: 280.9  8/14/2019 Yes    Overview Signed 8/19/2019  1:25 PM by Ami Cole MD     Anemia work-up (8/14/2019) showed serum iron 22, TIBC 371, iron % saturation 6, ferritin 34, reticulocyte count 1.4               Refusal of statin medication by patient ICD-10-CM: Z53.29  ICD-9-CM: V64.2  8/13/2019 Yes    Overview Signed 8/15/2019  2:01 PM by Ami Cole MD     As per note of Neurology (Dr. Lonnie Reed), patient refuses statin agent because of potential side effects.               * (Principal) Acute ischemic stroke [facility]) ICD-10-CM: I63.9  ICD-9-CM: 434.91  8/12/2019 Yes    Overview Signed 8/15/2019 12:17 AM by Ami Cole MD     Acute Ischemic Stroke (acute/early subacute infarct at the left posterior basal ganglia to corona radiata) with residual right hemiparesis, dysphagia and dysarthria             Impaired mobility and ADLs ICD-10-CM: Z74.09  ICD-9-CM: 799.89  8/12/2019 Yes        Received intravenous tissue plasminogen activator (tPA) in emergency department ICD-10-CM: Z92.82  ICD-9-CM: V45.88  8/12/2019 Yes        Right hemiparesis ([facility] [address] ICD-10-CM: G81.91  ICD-9-CM: 342.90  8/12/2019 Yes        Dysphagia ICD-10-CM: R13.10  ICD-9-CM: 787.20  8/12/2019 Yes        Dysarthria ICD-10-CM: R47.1  ICD-9-CM: 784.51  8/12/2019 Yes              Background:   Past Medical History:   Past Medical History:   Diagnosis Date    Acute ischemic stroke ([facility] [address] 8/12/2019    Acute Ischemic Stroke (acute/early subacute infarct at the left posterior basal ganglia to corona radiata) with residual right hemiparesis, dysphagia and dysarthria    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     On Allopurinol    Chronic hypokalemia     Persistent chronic hypokalemia + hypertension; ?primary hyperaldosteronism    CKD (chronic kidney disease) stage 2, GFR 60-89 ml/min 8/15/2019    Current use of aspirin 8/14/2019    Dysarthria 8/12/2019    Dysphagia 8/12/2019    Elevated prostate specific antigen (PSA) 7/21/2011    First degree atrioventricular block by electrocardiogram 8/12/2019    Hypertensive heart and kidney disease without heart failure and with stage 2 chronic kidney disease     Iron deficiency anemia 8/14/2019    Anemia work-up (8/14/2019) showed serum iron 22, TIBC 371, iron % saturation 6, ferritin 34, reticulocyte count 1.4     Obesity, Class I, BMI 30-34.9     Obstructive sleep apnea on CPAP     On statin therapy due to risk of future cardiovascular event 8/14/2019    On Atorvastatin    Primary osteoarthritis of right ankle 8/22/2019    X-ray of the right ankle (8/22/2019) showed no acute fracture or subluxation; advanced degenerative changes at the tibiotalar joint; old fracture fragment vs. accessory ossicle in the inferior aspect of the lateral malleolus; soft tissue swelling around the ankle.     Pure hypercholesterolemia 08/15/2019    Lipid profile (8/13/2019) showed , , HDL 42, ; Lipid profile (8/14/2019) showed , , HDL 39, LDL 94  Received intravenous tissue plasminogen activator (tPA) in emergency department 8/12/2019    Refusal of statin medication by patient 8/13/2019    As per note of Neurology (Dr. Sorin Garcia), patient refuses statin agent because of potential side effects.  Right hemiparesis ([facility] [address] 8/12/2019    Secondary hyperparathyroidism of renal origin ([facility] [address] 8/18/2019    PTH (8/18/2019) = 101.7    Type 2 diabetes mellitus with stage 2 chronic kidney disease (HCC)     HbA1c (8/13/2019) = 6.6    Vitamin B12 deficiency anemia 8/14/2019    Vitamin B12 (8/14/2019) = 208    Vitamin D deficiency 8/19/2019    Vitamin D 25-Hydroxy (8/19/2019) = 16.2       Patient taking anticoagulants yes Heparin, ASA   Patient has a defibrillator: no     Assessment:   Changes in Assessment throughout shift: No     Patient has central line: no Reasons if yes: Insertion date: Last dressing date:   Patient has Boykin Cath: no Reasons if yes:     Insertion date:     Last Vitals:     Vitals:    08/29/19 0621 08/29/19 1415 08/29/19 1555 08/29/19 2111   BP: 183/79 151/67 147/66 161/71   Pulse: 63 66 69 68   Resp:   18 19   Temp:   98.1 °F (36.7 °C) 99 °F (37.2 °C)   SpO2:   96% 96%   Weight:       Height:            PAIN    Pain Assessment    Pain Intensity 1: 0 (08/30/19 0000) Pain Intensity 1: 2 (12/29/14 1105)    Pain Location 1: Shoulder Pain Location 1: Abdomen    Pain Intervention(s) 1: Medication (see MAR) Pain Intervention(s) 1: Medication (see MAR)  Patient Stated Pain Goal: 0 Patient Stated Pain Goal: 0  o Intervention effective: no    o Other actions taken for pain:      Skin Assessment  Skin color Skin Color: Appropriate for ethnicity  Condition/Temperature Skin Condition/Temp: Dry, Warm  Integrity Skin Integrity: Intact  Turgor Turgor: Non-tenting  Weekly Pressure Ulcer Documentation  Pressure  Injury Documentation: No Pressure Injury Noted-Pressure Ulcer Prevention Initiated  Wound Prevention & Protection Methods  Orientation of wound Orientation of Wound Prevention: Posterior  Location of Prevention Location of Wound Prevention: Buttocks, Sacrum/Coccyx  Dressing Present Dressing Present : No  Dressing Status Dressing Status: Intact  Wound Offloading Wound Offloading (Prevention Methods): Bed, pressure redistribution/air     INTAKE/OUPUT  Date 08/29/19 0700 - 08/30/19 0659 08/30/19 0700 - 08/31/19 0659   Shift 2271-1612 4258-6331 24 Hour Total 5035-4631 8128-6607 24 Hour Total   INTAKE   Shift Total(mL/kg)         OUTPUT   Urine(mL/kg/hr)  775 775        Urine Voided  775 775        Urine Occurrence(s) 2 x 4 x 6 x      Stool           Stool Occurrence(s) 2 x 0 x 2 x      Shift Total(mL/kg)  775(7.6) 775(7.6)      NET  -775 -775      Weight (kg) 102 102 102 102 102 102       Recommendations:  1. Patient needs and requests:     2. Diet: Cardiac Mech Soft Nectar Thick     3. Pending tests/procedures:      4. Functional Level/Equipment:     5. Estimated Discharge Date: 8/24/19 Posted on Whiteboard in Patients Room: yes     HEALS Safety Check    A safety check occurred in the patient's room between off going nurse and oncoming nurse listed above. The safety check included the below items  Area Items   H  High Alert Medications - Verify all high alert medication drips (heparin, PCA, etc.)   E  Equipment - Suction is set up for ALL patients (with yanker)  - Red plugs utilized for all equipment (IV pumps, etc.)  - WOWs wiped down at end of shift.  - Room stocked with oxygen, suction, and other unit-specific supplies   A  Alarms - Bed alarm is set for fall risk patients  - Ensure chair alarm is in place and activated if patient is up in a chair   L  Lines - Check IV for any infiltration  - Boykin bag is empty if patient has a Boykin   - Tubing and IV bags are labeled   S  Safety   - Room is clean, patient is clean, and equipment is clean. - Hallways are clear from equipment besides carts.    - Fall bracelet on for fall risk patients  - Ensure room is clear and free of clutter  - Suction is set up for ALL patients (with diann)  - Hallways are clear from equipment besides carts.    - Isolation precautions followed, supplies available outside room, sign posted

## 2019-08-30 NOTE — PROGRESS NOTES
[x] Psychology  [] Social Work [] Recreational Therapy    INTERVENTION  UNITS/TIME OF SERVICE   Assessment    Supportive Counseling August 29, 2019   Orientation    Discharge Planning    Resource Linkage              Progress/Current Status    Patient seen individual support  on ARU this morning; he is found sitting upright in wheelchair in room, not in any distress, and observed practicing speech exercises as recommended to him. He immediately explains that he is \"doing everything I can to get out of here. \"  Patient is aware of his scheduled discharge from ARU and explained that he will go to SNF for continued support services, and then expects to return to home. Interestingly, patient's mother in law is in permanent residence at same nursing facility [facility]) and therefore expects that it will be easier for his wife to have both of them in same facility. Patient insists that he remains motivated in his therapy effort and is trying his best to regain function. While he is acknowledging areas of continued difficulty for himself, he seems steadfast in his desire to improve as much as possible and then \"accept\" what functional changes he will have to endure. Patient further acknowledges some information from what he has learned in stroke education and affirmed that there are changes he can make to improve his health maintenance. Patient reinforced for continued effort while in therapy on ARU. He is especially encouraged to dialogue with treatment team in order to become fully educated about his stroke recovery.     Hussain Chan, THE [facility] 8/30/2019 8:43 AM

## 2019-08-30 NOTE — PROGRESS NOTES
Pt not in room. VS better. /68. There is significant proteinuria. 24 hour urine for aldosterone is normal. He does not have primary hyperaldosteronism, but I think spironolactone will be useful from this point to control blood pressure and hypokalemia.

## 2019-08-30 NOTE — PROGRESS NOTES
Problem: Diabetes Self-Management  Goal: *Disease process and treatment process  Description  Define diabetes and identify own type of diabetes; list 3 options for treating diabetes. Outcome: Progressing Towards Goal  Goal: *Incorporating nutritional management into lifestyle  Description  Describe effect of type, amount and timing of food on blood glucose; list 3 methods for planning meals. Outcome: Progressing Towards Goal  Goal: *Incorporating physical activity into lifestyle  Description  State effect of exercise on blood glucose levels. Outcome: Progressing Towards Goal  Goal: *Developing strategies to promote health/change behavior  Description  Define the ABC's of diabetes; identify appropriate screenings, schedule and personal plan for screenings. Outcome: Progressing Towards Goal  Goal: *Using medications safely  Description  State effect of diabetes medications on diabetes; name diabetes medication taking, action and side effects. Outcome: Progressing Towards Goal  Goal: *Monitoring blood glucose, interpreting and using results  Description  Identify recommended blood glucose targets  and personal targets. Outcome: Progressing Towards Goal  Goal: *Prevention, detection, treatment of acute complications  Description  List symptoms of hyper- and hypoglycemia; describe how to treat low blood sugar and actions for lowering  high blood glucose level. Outcome: Progressing Towards Goal  Goal: *Prevention, detection and treatment of chronic complications  Description  Define the natural course of diabetes and describe the relationship of blood glucose levels to long term complications of diabetes.   Outcome: Progressing Towards Goal  Goal: *Developing strategies to address psychosocial issues  Description  Describe feelings about living with diabetes; identify support needed and support network  Outcome: Progressing Towards Goal  Goal: *Insulin pump training  Outcome: Progressing Towards Goal  Goal: *Sick day guidelines  Outcome: Progressing Towards Goal  Goal: *Patient Specific Goal (EDIT GOAL, INSERT TEXT)  Outcome: Progressing Towards Goal     Problem: Falls - Risk of  Goal: *Absence of Falls  Description  Document Loreli Hunger Fall Risk and appropriate interventions in the flowsheet. Outcome: Progressing Towards Goal  Note:   Fall Risk Interventions:  Mobility Interventions: Bed/chair exit alarm, Patient to call before getting OOB    Mentation Interventions: Bed/chair exit alarm, Adequate sleep, hydration, pain control    Medication Interventions: Bed/chair exit alarm, Patient to call before getting OOB    Elimination Interventions: Bed/chair exit alarm, Call light in reach, Patient to call for help with toileting needs    History of Falls Interventions: Bed/chair exit alarm         Problem: Falls - Risk of  Goal: *Absence of Falls  Description  Document Loreli Hunger Fall Risk and appropriate interventions in the flowsheet. Outcome: Progressing Towards Goal  Note:   Fall Risk Interventions:  Mobility Interventions: Bed/chair exit alarm, Patient to call before getting OOB    Mentation Interventions: Bed/chair exit alarm, Adequate sleep, hydration, pain control    Medication Interventions: Bed/chair exit alarm, Patient to call before getting OOB    Elimination Interventions: Bed/chair exit alarm, Call light in reach, Patient to call for help with toileting needs    History of Falls Interventions: Bed/chair exit alarm         Problem: Pressure Injury - Risk of  Goal: *Prevention of pressure injury  Description  Document Bartolome Scale and appropriate interventions in the flowsheet.   Outcome: Progressing Towards Goal  Note:   Pressure Injury Interventions:  Sensory Interventions: Assess changes in LOC, Chair cushion    Moisture Interventions: Absorbent underpads    Activity Interventions: Chair cushion, Pressure redistribution bed/mattress(bed type), Increase time out of bed    Mobility Interventions: Chair cushion, HOB 30 degrees or less, Pressure redistribution bed/mattress (bed type)    Nutrition Interventions: Document food/fluid/supplement intake    Friction and Shear Interventions: HOB 30 degrees or less                Problem: Pain  Goal: *Control of Pain  Outcome: Progressing Towards Goal  Goal: *PALLIATIVE CARE:  Alleviation of Pain  Outcome: Progressing Towards Goal     Problem: Dysphagia (Adult)  Goal: *Speech Goal: (INSERT TEXT)  Description  Long term goals  Patient will:  1. Tolerate regular diet with thin liquids using safe swallowing techniques without s/s of aspiration in 5/6 meals. 2. Use safe swallowing techniques (including slowed rate, small bites/sips, alternate liquids/solids, effortful swallow, head rotation to right for liquids) with supervision. 3. Participate in MBS study prior to advancing to thin liquids to assure safety (no aspiration). 4. Perform oral and pharyngeal strengthening exercises (to improve speech and swallowing) with supervision-modified independence. 5. Demonstrate 90% intelligibility in conversation using appropriate speaking strategies (slowed rate, overarticulation, increased volume). 6. Recall 3 words after 5 minutes with supervision. 7. Perform functional problem solving/reasoning tasks with 90% accuracy. 8. Name 8-10 items in categories of increasing abstraction, supervision. Short term goals (by 8/30/19)  Patient will:   1. Tolerate soft diet with nectar thick liquids using safe swallowing techniques without s/s of aspiration in 5/6 meals. 2. Use safe swallowing techniques (including slowed rate, small bites/sips, alternate liquids/solids, effortful swallow, head rotation to right for liquids) with min cues. 3. Tolerate small amounts of ice chips and water with the SLP using head rotation to the right and/or chin tuck, without overt s/s of aspiration.   4. Perform oral and pharyngeal strengthening exercises (to improve speech and swallowing) with min assist.  5. Demonstrate 90% intelligibility in polysyllabic words and sentences using appropriate speaking strategies (slowed rate, overarticulation, increased volume). 6. Recall 3 words after 5 minutes with mod assist.  7. Perform functional problem solving/reasoning tasks with 75-85% accuracy. 8. Name 8-10 items in concrete categories, supervision.       Outcome: Progressing Towards Goal     Problem: Injury - Risk of, Adverse Drug Event  Goal: *Absence of adverse drug events  Outcome: Progressing Towards Goal  Goal: *Absence of medication errors  Outcome: Progressing Towards Goal  Goal: *Knowledge of prescribed medications  Outcome: Progressing Towards Goal     Problem: Inpatient Rehab (Adult)  Goal: *LTG: Avoids injury/falls 100% of time related to deficits  Outcome: Progressing Towards Goal  Goal: *LTG: Avoids infection 100% of time related to deficits  Outcome: Progressing Towards Goal  Goal: *LTG: Verbalize understanding of diagnosis and risk factors for recurring stroke  Outcome: Progressing Towards Goal  Goal: *LTG: Absence of DVT during hospitalization  Outcome: Progressing Towards Goal  Goal: *LTG: Maintains Skin Integrity With No Evidence of Pressure Injury 100% of Time  Outcome: Progressing Towards Goal  Goal: Interventions  Outcome: Progressing Towards Goal

## 2019-08-30 NOTE — PROGRESS NOTES
[facility]  [address] [place] [address], [address]     INPATIENT REHABILITATION  DAILY PROGRESS NOTE     Date: 8/30/2019    Name: Adithya Lopez Age / Sex: 71 y.o. / male   CSN: [id number] MRN: [id number]   [facility] Date: 8/15/2019 Length of Stay: 15 days     Primary Rehab Diagnosis: Impaired Mobility and ADLs secondary to Acute Ischemic Stroke (acute/early subacute infarct at the left posterior basal ganglia to corona radiata) with residual right hemiparesis, dysphagia and dysarthria      Subjective:     Patient seen and examined. Blood pressure fairly controlled. Blood glucose controlled. Patient's Complaint:   No significant medical complaints    Pain Control: no current joint or muscle symptoms, essentially pain-free      Objective:     Vital Signs:  Patient Vitals for the past 24 hrs:   BP Temp Pulse Resp SpO2   08/30/19 0800 125/68 97.1 °F (36.2 °C) 62 18 97 %   08/30/19 0603 183/80 -- 69 -- --   08/29/19 2111 161/71 99 °F (37.2 °C) 68 19 96 %   08/29/19 1555 147/66 98.1 °F (36.7 °C) 69 18 96 %        Physical Examination:  GENERAL SURVEY: Patient is awake, alert, oriented x 3, sitting comfortably on the chair, not in acute respiratory distress. HEENT: pink palpebral conjunctivae, anicteric sclerae, no nasoaural discharge, moist oral mucosa  NECK: supple, no jugular venous distention, no palpable lymph nodes  CHEST/LUNGS: symmetrical chest expansion, good air entry, clear breath sounds  HEART: adynamic precordium, good S1 S2, no S3, regular rhythm, no murmurs  ABDOMEN: obese, bowel sounds appreciated, soft, non-tender  EXTREMITIES: pink nailbeds, no edema except for right ankle swelling, full and equal pulses, no calf tenderness   NEUROLOGICAL EXAM: The patient is awake, alert and oriented x3, able to answer questions fairly appropriately, able to follow 1 and 2 step commands. Able to tell time from the wall clock.   Cranial nerves II-XII are grossly intact except for slightly shallow right nasolabial fold, dysarthria and dysphagia. No gross sensory deficit. Motor strength is 5/5 on the LUE and LLE, 0/5 on the RUE (except for 1/5 on the right shoulder), 2+/5 on RLE (except for 0/5 on the right ankle/foot).       Current Medications:  Current Facility-Administered Medications   Medication Dose Route Frequency    labetalol (NORMODYNE) tablet 600 mg  600 mg Oral Q12H    ferrous sulfate tablet 325 mg  1 Tab Oral DAILY WITH BREAKFAST    hydrALAZINE (APRESOLINE) tablet 75 mg  75 mg Oral Q8H    baclofen (LIORESAL) tablet 10 mg  10 mg Oral TID    spironolactone (ALDACTONE) tablet 50 mg  50 mg Oral BID    cyanocobalamin tablet 1,000 mcg  1,000 mcg Oral DAILY    cholecalciferol (VITAMIN D3) capsule 5,000 Units  5,000 Units Oral DAILY    isosorbide dinitrate (ISORDIL) tablet 30 mg  30 mg Oral Q8H    hydrALAZINE (APRESOLINE) tablet 25 mg  25 mg Oral TID PRN    acetaminophen (TYLENOL) tablet 650 mg  650 mg Oral Q4H PRN    bisacodyl (DULCOLAX) tablet 10 mg  10 mg Oral Q48H PRN    heparin (porcine) injection 5,000 Units  5,000 Units SubCUTAneous Q8H    insulin lispro (HUMALOG) injection   SubCUTAneous TIDAC    aspirin chewable tablet 81 mg  81 mg Oral DAILY WITH BREAKFAST    amLODIPine (NORVASC) tablet 10 mg  10 mg Oral DAILY    atorvastatin (LIPITOR) tablet 80 mg  80 mg Oral QHS       Allergies:  No Known Allergies      Lab/Data Review:  Recent Results (from the past 24 hour(s))   GLUCOSE, POC    Collection Time: 08/29/19  4:29 PM   Result Value Ref Range    Glucose (POC) 142 (H) 70 - 110 mg/dL   GLUCOSE, POC    Collection Time: 08/29/19  9:10 PM   Result Value Ref Range    Glucose (POC) 118 (H) 70 - 110 mg/dL   GLUCOSE, POC    Collection Time: 08/30/19  7:42 AM   Result Value Ref Range    Glucose (POC) 119 (H) 70 - 110 mg/dL   GLUCOSE, POC    Collection Time: 08/30/19 12:18 PM   Result Value Ref Range    Glucose (POC) 115 (H) 70 - 110 mg/dL       Estimated Glomerular Filtration Rate:  On admission, estimated GFR based on a Creatinine of 1.20 mg/dl:              Using CKD-EPI = 63.1 mL/min/1.73m2              Using MDRD = 64.6 mL/min/1.73m2  Most recent estimated GFR, based on a Creatinine of 1.54 mg/dl on 8/29/2019:   Using CKD-EPI = 46.7 mL/min/1.73m2   Using MDRD = 48.4 mL/min/1.73m2       Assessment:     Primary Rehabilitation Diagnosis  1. Impaired Mobility and ADLs  2. Acute Ischemic Stroke (acute/early subacute infarct at the left posterior basal ganglia to corona radiata) with residual right hemiparesis, dysphagia and dysarthria     Comorbidities   Obesity, Class I, BMI 30-34.9 E66.9    Obstructive sleep apnea on CPAP G47.33, Z99.89    Hypertensive heart and kidney disease without heart failure and with stage 2 chronic kidney disease I13.10, N18.2    Chronic hypokalemia E87.6    CKD (chronic kidney disease) stage 2, GFR 60-89 ml/min N18.2    Current use of aspirin Z79.82    On statin therapy due to risk of future cardiovascular event Z79.899    Type 2 diabetes mellitus with stage 2 chronic kidney disease  E11.22, N18.2    Pure hypercholesterolemia E78.00    Elevated prostate specific antigen (PSA) R97.20    Refusal of statin medication by patient Z48.34    First degree atrioventricular block by electrocardiogram I44.0    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 20X0    Leukocytosis D72.829    Iron deficiency anemia D50.9    Vitamin B12 anemia D51.9    Vitamin D deficiency E55.9    Secondary hyperparathyroidism of renal origin N25.80        Plan:     1. Justification for continued stay: Good progression towards established rehabilitation goals. 2. Medical Issues being followed closely:    [x]  Fall and safety precautions     []  Wound Care     [x]  Bowel and Bladder Function     [x]  Fluid Electrolyte and Nutrition Balance     []  Pain Control      3.  Issues that 24 hour rehabilitation nursing is following:    [x]  Fall and safety precautions     [] Wound Care     [x]  Bowel and Bladder Function     [x]  Fluid Electrolyte and Nutrition Balance     []  Pain Control      [x]  Assistance with and education on in-room safety with transfers to and from the bed, wheelchair, toilet and shower. 4. Acute rehabilitation plan of care:    [x]  Continue current care and rehab. [x]  Physical Therapy           [x]  Occupational Therapy           [x]  Speech Therapy     []  Hold Rehab until further notice     5. Medications:    [x]  MAR Reviewed     [x]  Continue Present Medications     6. DVT Prophylaxis:      []  Lovenox     [x]  Unfractionated Heparin     []  Coumadin     []  NOAC     []  SUZAN Stockings     []  Sequential Compression Device     []  None     7.  Orders:   > Acute Ischemic Stroke (acute/early subacute infarct at the left posterior basal ganglia to corona radiata) with residual right hemiparesis, dysphagia and dysarthria; S/P Administration of intravenous tPA (8/12/2019 - [address])   > On 8/22/2019, started Baclofen 5 mg PO TID   > On 8/27/2019, increased Baclofen from 5 mg to 10 mg PO TID   > Continue:    > Aspirin 81 mg PO once daily with breakfast    > Atorvastatin 80 mg PO q HS    > Baclofen 10 mg PO TID    > Chronic hypokalemia    Serum Potassium during hospital stay at [address]      08/15/19  0330 08/14/19  1848 08/14/19  0342 08/13/19  1205 08/12/19  2328   K 3.0* 2.8* 2.7* 2.8* 2.4*          > K (8/16/2019, on admission) = 3.8   > Mg (8/16/2019, on admission) = 1.9   > Upon admission to the ARU, patient was given Klor Con 40 meq PO BID with meals x 2 days   > K (8/17/2019) = 3.5   > K (8/18/2019) = 3.7   > K (8/19/2019) = 3.5   > K (8/22/2019) = 3.6   > Mg (8/22/2019) = 2.2   > On 8/22/2019, started Spironolactone 50 mg PO BID   > K (8/24/2019) = 3.8   > K (8/26/2019) = 4.4   > Will need to monitor serum potassium levels closely as patient is at high risk for hyperkalemia due slowly rising serum potassium levels since starting high-dose Spironolactone; planned to add HCTZ or Furosemide to offset the potassium-sparing characteristics of Spironolactone but will hold off due to rising BUN/Creatinine levels   > K (8/28/2019) = 4.6   > Mg (8/28/2019) = 2.3   > On 8/28/2019, discontinued Mg(OH)2 400 mg PO once daily   > K (8/29/2019) = 4.1   > Continue Spironolactone 50 mg PO BID    > Hypertensive heart and kidney disease without heart failure and with stage 2 chronic kidney disease   > Upon admission to the ARU, increased Hydralazine from 50 mg to 75 mg PO q 8 hr (6AM, 2PM, 10PM)   > Once work-up for primary hyperaldosteronism is complete, plan to start Spironolactone 25 mg PO once daily   > On 8/16/2019:    > Discontinued Metoprolol succinate 50 mg PO once daily (6AM)    > Started:     > Labetalol 200 mg PO q 12 hr (9AM, 9PM)     > Isosorbide dinitrate 10 mg P) q 8 hr (6AM, 2PM, 10PM)   > On 8/18/2019, added Hydralazine 25 mg PO TID PRN for SBP greater than 170 mmHg   > On 8/19/2019:    > Increased Labetalol from 200 mg to 300 mg PO q 12 hr (9AM, 9PM)    > Increased Isosorbide dinitrate from 10 mg to 20 mg PO q 8 hr (6AM, 2PM, 10PM)   > On 8/20/2019:    > Increased Labetalol from 300 mg to 400 mg PO q 12 hr (9AM, 9PM)    > Increased Isosorbide dinitrate from 20 mg to 30 mg PO q 8 hr (6AM, 2PM, 10PM)   > On 8/22/2019:    > Increased Labetalol from 400 mg to 600 mg PO q 12 hr (9AM, 9PM)    > Started Spironolactone 50 mg PO BID   > On 8/27/2019, increased Hydralazine from 75 mg to 100 mg PO q 8 hr (6AM, 2PM, 10PM)   > On 8/28/2019, decreased Hydralazine from 100 mg to 75 mg PO q 8 hr (6AM, 2PM, 10PM)   > Plan to add ACE-I or ARB for proteinuria once renal function stable and serum creatinine and serum potassium are no longer rising upwards   > Continue:    > Amlodipine 10 mg PO once daily (9AM)    > Hydralazine 75 mg PO q 8 hr (6AM, 2PM, 10PM)    > Labetalol 600 mg PO q 12 hr (change from 9AM, 9PM to 6AM, 6PM)    > Isosorbide dinitrate 30 mg PO q 8 hr (6AM, 2PM, 10PM)    > Spironolactone 50 mg PO BID (9AM, 9PM)    > Hydralazine 25 mg PO TID PRN for SBP greater than 170 mmHg    > Pure hypercholesterolemia   > Lipid profile (8/13/2019) showed , , HDL 42,    > Lipid profile (8/14/2019) showed , , HDL 39, LDL 94   > Continue Atorvastatin 80 mg PO q HS    > Type 2 diabetes mellitus with stage 2 chronic kidney disease   > HbA1c (4/16/2014) = 6.2   > Patient has had chronic kidney disease even before his diagnosis of Type 2 diabetes mellitus and is presumed to be due to prolonged uncontrolled hypertension   > HbA1c (8/13/2019) = 6.6   > Continue Humalog insulin sliding scale SC TID AC    >  ? Primary hyperaldosteronism (ruled out) as etiology of chronic hypokalemia and difficult to control hypertension   > Endocrinology consult (Dr. Татьяна Grey) was called at [facility] prior to discharge/transfer to the ARU   > Aldosterone/Renin activity (8/16/2019):    > Aldosterone = 8.8 (NORMAL)    > Renin Activity = 0.300 (NORMAL)    > Aldosterone/Renin Activity = 29 (ELEVATED) ~ EQUIVOCAL, requires confirmation    > Last intake of Losartan was on 8/11/2019 (prior to admission)    > Endocrinology consult (Dr. Shanta Arevalo):    > ASSESSMENT:     1. Chronic hypokalemia. 2.  Diabetes mellitus. 3.  Diabetic nephropathy with a stage II chronic renal disease. 4.  Normocytic anemia. 5.  Vitamin B12 deficiency. 6.  Borderline iron deficiency. 7.  Hypoalbuminemia.     > DISCUSSION:  The patient may well have hyperaldosteronism, but he may have it on the basis of his nephropathy, which is due to his underlying diabetes.   The cause of his anemia might be a combination of B12 deficiency and iron deficiency together, so investigation should be done for this.     > PLAN: Laboratory studies:     1.  24-hour urine for aldosteronism (8/20/2019) = 12.78 (N.V. = 0-19) (NORMAL)     2. Methylmalonic acid level (8/18/2019) = 468 (ELEVATED)     3.  Gastrin level (8/20/2019) = <10 (NORMAL)     4. Microalbumin-to-creatinine ratio. 5.  Transferrin and prealbumin levels. > FSH (8/19/2019) = 5.6 (NORMAL)    > LH (8/18/2019) = 6.8 (NORMAL)    > Free T4 (8/18/2019) = 1.0 (NORMAL)    > TSH (8/18/2019) = 4.63 (ELEVATED)    > Urine aldosterone (8/20/2019): 11.1 ug/L    > Transferrin (8/22/2019) = 275   > Excerpt from the Progress Note (dated 8/22/2019) by Dr. Luis M Vasques:    > \"Griffin level is not elevated. 24 hour urine griffin is pending. The aldosterone/renin level is not high. If he has primary aldosteronism, it is very likely to be idiopathic rather an an aldosterone producing adenoma. This is susceptible to medical therapy. Will start spironolactone at 50 mg twice daily. \"    > On 8/22/2019, started Spironolactone 50 mg PO BID   > Unable to proceed with plasma aldosterone confirmatory tests as patient had been started on high-dose Spironolactone; unable to determine presence of adrenal adenoma in the absence of imaging studies   > Patient does not have Primary hyperaldosteronism as per Endocrinology   > Continue Spironolactone 50 mg PO BID    > Leukocytosis, resolved   > WBC count (8/15/2019) = 11.7   > No fever documented since admission to the ARU   > WBC count (8/16/2019, on admission) = 17.9   > Work-up:    > Urinalysis (8/16/2019): WNL    > Chest x-ray (PA-Lateral) (8/16/2019) showed a minimally blunted left posterior costophrenic angle could be due to either a tiny effusion or chronic pleural reaction/scarring; mild cardiomegaly; atherosclerosis.    > WBC count (8/18/2019) = 11.6    > Iron deficiency anemia / Vitamin B12 anemia   > Anemia work-up (8/14/2019) showed serum iron 22, TIBC 371, iron % saturation 6, ferritin 34, reticulocyte count 1.4   > Vitamin B12 (8/14/2019) = 208   > Hgb/Hct (8/15/2019) = 10.1/31.3    > Hgb/Hct (8/16/2019, on admission) = 10.7/32.6   > Hgb/Hct (8/18/2019) = 8.7/27.4   > Hgb/Hct (8/19/2019) = 8.4/25.2   > On 8/19/2019, started:     > FeSO4 325 mg PO once daily with breakfast     > Ascorbic Acid 250 mg PO once daily with breakfast (to enhance the absorption of the FeSO4)     > Cyanocobalamin 1,000 mcg PO once daily    > Epoetin tone 10,000 units SC x 1 dose   > Hgb/Hct (8/22/2019) = 8.6/26.3   > On 8/22/2019, Epoetin tone 10,000 units SC x 1 dose     > Hgb/Hct (8/26/2019) = 8.2/25.6   > On 8/26/2019, patient was given Epoetin tone 10,000 units SC x 1 dose     > On 8/27/2019, started Iron sucrose 50 mg IV q 24 hr   > On 8/28/2019:    > Discontinued:     > FeSO4 325 mg PO once daily with breakfast      > Ascorbic Acid 250 mg PO once daily with breakfast (to enhance the absorption of the FeSO4)     > Increased Iron sucrose from 50 mg to 200 mg IV q 24 hr   > Hgb/Hct (8/29/2019) = 8.0/25.5    > Vitamin B12 (8/29/2019) =  409 (from 208 on 8/14/2019)   > Continue:    > Cyanocobalamin 1,000 mcg PO once daily    > Iron sucrose 200 mg IV q 24 hr    > Vitamin D Deficiency   > PTH (8/18/2019) = 101.7   > Vitamin D 25-Hydroxy (8/19/2019) = 16.2   > On 8/19/2019, patient was given Cholecalciferol 50,000 units PO x 1 dose    > On 8/20/2019, started Cholecalciferol 5,000 units PO once daily   > Continue Cholecalciferol 5,000 units PO once daily    > Right ankle swelling, most likely dependent edema due to hemiparesis/lack of muscle contraction and osteoarthritis of the right ankle   > (+) nonpainful swelling of the right ankle for the past few days; denies any trauma   > X-ray of the right ankle (8/22/2019) showed no acute fracture or subluxation; advanced degenerative changes at the tibiotalar joint; old fracture fragment vs. accessory ossicle in the inferior aspect of the lateral malleolus; soft tissue swelling around the ankle.   > Elevate right leg/foot when supine in bed    > Stage 2 chronic kidney disease --> Acute kidney injury superimposed on CKD stage 2 VS progression to Stage 3 chronic kidney disease   > On admission, estimated GFR based on a Creatinine of 1.20 mg/dl:               > Using CKD-EPI = 63.1 mL/min/1.73m2               > Using MDRD = 64.6 mL/min/1.73m2   > Estimated GFR, based on a Creatinine of 1.49 mg/dl on 8/26/2019:    > Using CKD-EPI = 48.6 mL/min/1.73m2    > Using MDRD = 57.4 mL/min/1.73m2    > Urine creatinine (8/26/2019) = 31.00   > Microalbumin, urine (8/26/2019) = 56.00   > Microalbumin/Creatinine ratio (8/26/2019) = 1806   > Nephrology consult (Dr. Leigh Ann Peters) for evaluation and comanagement    > Impression:     > Chronic kidney disease stage III with heavy proteinuria, diabetic nephropathy     > Proteinuria, diabetes related, not on any antiproteinuric medication      > Hypertension, well controlled     > Diabetes     > Recent acute ischemic stroke     > Chronic hypokalemia, on spiranolactone     > Anemia     > Secondary hyperparathyroidism    > Plan:     > I will start him on IV Venofer, side effects discussed. Plan to start on Epogen once his iron store is repleted. > Continue spiranolactone, monitor potassium.      > Monitor renal function for now, will add ACE inhibitor for protienuria in near future. > Adjust medication for current renal function status.    > BUN/Creatinine (8/28/2019) = 37/1.69    > BUN/Creatinine (8/29/2019) = 36/1.54     > Diet:   > On 8/16/2019, solids consistency was advanced from Mechanical soft (NDD 2) to Soft solids (NDD 3)   > Modified barium swallow (8/27/2019) showed silent aspiration of thin liquid; no drew penetration or aspiration with other tested consistencies. > Specifications: Diabetic, low saturated fat   > Solids (consistency): Soft solids (NDD 3)   > Liquids (consistency): Mildly thick (Nectar-thick)        8. Patient's progress in rehabilitation and medical issues discussed with the patient.  All questions answered to the best of my ability. Care plan discussed with patient, wife and nurse.       Signed:    Victorina Ramirez MD    August 30, 2019

## 2019-08-31 LAB
GLUCOSE BLD STRIP.AUTO-MCNC: 112 MG/DL (ref 70–110)
GLUCOSE BLD STRIP.AUTO-MCNC: 115 MG/DL (ref 70–110)
GLUCOSE BLD STRIP.AUTO-MCNC: 129 MG/DL (ref 70–110)
GLUCOSE BLD STRIP.AUTO-MCNC: 144 MG/DL (ref 70–110)

## 2019-08-31 PROCEDURE — 74011250637 HC RX REV CODE- 250/637: Performed by: INTERNAL MEDICINE

## 2019-08-31 PROCEDURE — 82962 GLUCOSE BLOOD TEST: CPT

## 2019-08-31 PROCEDURE — 74011250636 HC RX REV CODE- 250/636: Performed by: INTERNAL MEDICINE

## 2019-08-31 PROCEDURE — 97530 THERAPEUTIC ACTIVITIES: CPT

## 2019-08-31 PROCEDURE — 92526 ORAL FUNCTION THERAPY: CPT

## 2019-08-31 PROCEDURE — 92507 TX SP LANG VOICE COMM INDIV: CPT

## 2019-08-31 PROCEDURE — 97112 NEUROMUSCULAR REEDUCATION: CPT

## 2019-08-31 PROCEDURE — 65310000000 HC RM PRIVATE REHAB

## 2019-08-31 RX ADMIN — LABETALOL HYDROCHLORIDE 600 MG: 200 TABLET, FILM COATED ORAL at 06:07

## 2019-08-31 RX ADMIN — HYDRALAZINE HYDROCHLORIDE 75 MG: 50 TABLET, FILM COATED ORAL at 06:07

## 2019-08-31 RX ADMIN — BACLOFEN 10 MG: 10 TABLET ORAL at 06:06

## 2019-08-31 RX ADMIN — HYDRALAZINE HYDROCHLORIDE 75 MG: 50 TABLET, FILM COATED ORAL at 21:48

## 2019-08-31 RX ADMIN — BACLOFEN 10 MG: 10 TABLET ORAL at 17:24

## 2019-08-31 RX ADMIN — ATORVASTATIN CALCIUM 80 MG: 40 TABLET, FILM COATED ORAL at 21:47

## 2019-08-31 RX ADMIN — HEPARIN SODIUM 5000 UNITS: 5000 INJECTION INTRAVENOUS; SUBCUTANEOUS at 21:48

## 2019-08-31 RX ADMIN — FERROUS SULFATE TAB 325 MG (65 MG ELEMENTAL FE) 325 MG: 325 (65 FE) TAB at 08:25

## 2019-08-31 RX ADMIN — BACLOFEN 10 MG: 10 TABLET ORAL at 12:07

## 2019-08-31 RX ADMIN — SPIRONOLACTONE 50 MG: 25 TABLET ORAL at 21:47

## 2019-08-31 RX ADMIN — CYANOCOBALAMIN TAB 1000 MCG 1000 MCG: 1000 TAB at 08:25

## 2019-08-31 RX ADMIN — HEPARIN SODIUM 5000 UNITS: 5000 INJECTION INTRAVENOUS; SUBCUTANEOUS at 06:08

## 2019-08-31 RX ADMIN — ASPIRIN 81 MG 81 MG: 81 TABLET ORAL at 08:25

## 2019-08-31 RX ADMIN — Medication 5000 UNITS: at 08:25

## 2019-08-31 RX ADMIN — HYDRALAZINE HYDROCHLORIDE 75 MG: 50 TABLET, FILM COATED ORAL at 14:31

## 2019-08-31 RX ADMIN — AMLODIPINE BESYLATE 10 MG: 10 TABLET ORAL at 08:25

## 2019-08-31 RX ADMIN — LABETALOL HYDROCHLORIDE 600 MG: 200 TABLET, FILM COATED ORAL at 17:23

## 2019-08-31 RX ADMIN — ISOSORBIDE DINITRATE 30 MG: 20 TABLET ORAL at 21:48

## 2019-08-31 RX ADMIN — HEPARIN SODIUM 5000 UNITS: 5000 INJECTION INTRAVENOUS; SUBCUTANEOUS at 14:30

## 2019-08-31 RX ADMIN — ISOSORBIDE DINITRATE 30 MG: 20 TABLET ORAL at 06:06

## 2019-08-31 RX ADMIN — ISOSORBIDE DINITRATE 30 MG: 20 TABLET ORAL at 14:31

## 2019-08-31 RX ADMIN — SPIRONOLACTONE 50 MG: 25 TABLET ORAL at 08:25

## 2019-08-31 NOTE — PROGRESS NOTES
Endocrinology Consultation Progress Note    Patient: Adithya Lopez Age: 71 y.o. : 1955 MR#: [id number] SSN: [id number]  Date: 2019     Subjective:    Patient seen at chair side today. Patient states that he is feeling much better today. Wife present and she states that he is improved significantly with physical therapy. No acute overnight events. Assessment/Plan: This is a 69-year-old white man who was admitted for a acute ischemic stroke at the left posterior basal ganglia to the corona radiata and now is in acute inpatient rehab. He was found to be hypokalemic and hypertensive and endocrinology was consulted for concerns for hyperaldosteronism as a secondary cause of hypertension. Work-up has shown no hyperaldosteronism and hypokalemia most likely related to CKD secondary to his diabetes. He has responded well to spironolactone 50 mg p.o. twice daily and potassium is now currently at goal.  He does have an elevated PTH of 101.2 and this is thought to be a secondary hyperparathyroidism secondary to his chronic kidney disease. He was also found to be vitamin D deficient with a vitamin D 25 OH of 16.2 (this may have some contribution to his elevated PTH as well). He was started on cholecalciferol 5000 units p.o. daily. Would    -Would continue spironolactone 50 mg p.o. twice daily as blood pressure tolerates.    -Would continue to check potassium to see if further adjustments need to be made with his Spironolactone.    -Would continue-vitamin D supplementation and recheck vitamin D 25 OH in approximately 12 weeks. If levels are greater than 30, then cholecalciferol can be reduced to 1000 units p.o. daily.    -Would continue current regimen for diabetes since he is well controlled with an A1c of 6.6%. Take you for the consult, Will continue to follow patient with you while admitted to rehab.       Case discussed with:  [x]Patient  []Family  []Nursing  []Case Management  Time Spent: 30 minutes      Objective:   VS:   Visit Vitals  /74   Pulse 62   Temp 98.7 °F (37.1 °C)   Resp 18   Ht 5' 9\" (1.753 m)   Wt 102 kg (224 lb 14.4 oz)   SpO2 97%   BMI 33.21 kg/m²      Tmax/24hrs: Temp (24hrs), Av.9 °F (36.6 °C), Min:97 °F (36.1 °C), Max:98.7 °F (37.1 °C)      Intake/Output Summary (Last 24 hours) at 2019 1904  Last data filed at 2019 1300  Gross per 24 hour   Intake 420 ml   Output 500 ml   Net -80 ml     PE  General: Alert oriented x3, NAD  HEENT: Normocephalic, EOMI  Neuro: Right-sided facial droop present  Extremities:  unable to move right upper extremity but able to move right lower extremity  Speech: Clear and understandable  Psych: Good mood normal affect    ROS: As per subjective, otherwise negative    Labs:    Recent Results (from the past 24 hour(s))   GLUCOSE, POC    Collection Time: 19  8:12 PM   Result Value Ref Range    Glucose (POC) 125 (H) 70 - 110 mg/dL   GLUCOSE, POC    Collection Time: 19  7:46 AM   Result Value Ref Range    Glucose (POC) 115 (H) 70 - 110 mg/dL   GLUCOSE, POC    Collection Time: 19 11:41 AM   Result Value Ref Range    Glucose (POC) 129 (H) 70 - 110 mg/dL   GLUCOSE, POC    Collection Time: 19  4:43 PM   Result Value Ref Range    Glucose (POC) 112 (H) 70 - 110 mg/dL     Results for Vance Meehan (MRN [id number]) as of 2019 19:16   Ref.  Range 2019 06:09 2019 06:06   Sodium Latest Ref Range: 136 - 145 mmol/L 140 141   Potassium Latest Ref Range: 3.5 - 5.5 mmol/L 4.6 4.1   Chloride Latest Ref Range: 100 - 111 mmol/L 108 108   CO2 Latest Ref Range: 21 - 32 mmol/L 24 24   Anion gap Latest Ref Range: 3.0 - 18 mmol/L 8 9   Glucose Latest Ref Range: 74 - 99 mg/dL 88 91   BUN Latest Ref Range: 7.0 - 18 MG/DL 37 (H) 36 (H)   Creatinine Latest Ref Range: 0.6 - 1.3 MG/DL 1.69 (H) 1.54 (H)   BUN/Creatinine ratio Latest Ref Range: 12 - 20   22 (H) 23 (H)   Calcium Latest Ref Range: 8.5 - 10.1 MG/DL 8.3 (L) 8.7 Phosphorus Latest Ref Range: 2.5 - 4.9 MG/DL 5.4 (H)    Magnesium Latest Ref Range: 1.6 - 2.6 mg/dL 2.3    Results for Mert Hernandes (MRN [id number]) as of 8/31/2019 19:16   Ref. Range 8/19/2019 06:55   Vitamin D 25-Hydroxy Latest Ref Range: 30 - 100 ng/mL 16.2 (L)   Results for Mert Hernandes (MRN [id number]) as of 8/31/2019 19:16   Ref. Range 8/16/2019 06:30   Aldosterone Latest Ref Range: 0.0 - 30.0 ng/dL 8.8   Renin Activity Latest Ref Range: 0.167 - 5.380 ng/mL/hr 0.300     Component Value Flag Ref Range Units Status   Total volume 1,150    mL Corrected   Comment:   CORRECTED ON 08/21 AT 0945: PREVIOUSLY REPORTED AS 0   Period of collection 24    hr Corrected   Comment:   CORRECTED ON 08/21 AT 0945: PREVIOUSLY REPORTED AS 0   Aldosterone urine 11.11   Not Estab. ug/L Final   Comment:   (NOTE)   This test was developed and its performance characteristics   determined by [facility]. It has not been cleared or approved   by the Food and Drug Administration.     Aldosterone urine, 24hr 12.78   0.00 - 19.00 ug/24 hr Final   Comment:       Component Value Flag Ref Range Units Status   Calcium 8.6   8.5 - 10.1 MG/DL Final   PTH, Intact 101.7  High   18.4 - 88.0 pg/mL Final       Signed By: Hector Hopkins MD     August 31, 2019 7:04 PM

## 2019-08-31 NOTE — PROGRESS NOTES
Speech language pathology treatment    Patient: Almeda Dance (20 y.o. male)  Date: 8/31/2019  Diagnosis: Acute ischemic stroke ([facility] [address] [I63.9] Acute ischemic stroke ([facility] [address]      Time In: 8:30 9:30  Time Out:  9:00 10:00    ASSESSMENT:  Pt alert and oriented x4. Pt was seen for two 30 min sessions, early in the AM for breakfast in the dining wyatt and later in the morning in the SLP's office. Pt is eager to improve and works hard during treatment sessions. At breakfast Pt benefited from verbal cues to utilize effortful swallow, to decrease rate, and take small sips/bites. Pt had 1 instance of throat clear and rhinorrhea following PO trials of nectar thick liquids. Pt assessed to have decreased hyolaryngeal excursion upon palpation. SLP noted Pt to clear oral cavity after diet trials. He continues to have decreased intelligibility in conversation, memory, and problem solving skills Pt benefits from semantic cues, models, and verbal cues from SLP. Pt continues to benefit from services to address his deficits. Progression toward goals:  [x]       Improving appropriately and progressing toward goals  []       Improving slowly and progressing toward goals  []       Not making progress toward goals and plan of care will be adjusted     PLAN:  Patient continues to benefit from skilled intervention to address the above impairments. Continue treatment per established plan of care. Discharge Recommendations:  Skilled Nursing Facility     SUBJECTIVE:   Patient stated Ro Saldivar. OBJECTIVE:   Mental Status:  Neurologic State: Alert  Orientation Level: Oriented X4  Cognition: Follows commands  Perception: Appears intact  Perseveration: No perseveration noted  Safety/Judgement: Decreased insight into deficits, Good awareness of safety precautions  Treatment & Interventions:   Motor Speech:   Oral motor exercises: ~80% accuracy with Min-Mod A  Naming speech strategies for improved intelligibility: named 1, speaking slowly    Neuro-Linguistics:   Recall 3 words after 5 min delay: recalled 3 with Max A, benefited from creating a story to recall information  Recall 3 words after 5 min delay: recalled 1 Nestor  At conclusion of session, Pt was asked to recall any of the 6 words he was asked to remember earlier in his session: 5 out of 6 named Nestor  Functional problem solving, related to preparation for seasonal changes and severe weather: 83% accuracy Nestor  Recalling information, relating to current events: named 4 Nestor    Dysphagia Treatment:  Pt received an advanced soft diet with nectar thick liquids. At breakfast Pt benefited from verbal cues to utilize effortful swallow, to decrease rate, and take small sips/bites. Pt had 1 instance of throat clear and rhinorrhea following PO trials of nectar thick liquids. Pt assessed to have decreased hyolaryngeal excursion upon palpation. SLP noted Pt to clear oral cavity after diet trials. Pt named safe swallowing strategies: small bites/sips Nestor  Utilized safe swallowing strategies/compensatory techniques with PO trials: Mod A to use consistently     Response & Tolerance to Activities:  Pt was in a good mood during treatment and responds positively to clinician cues. Pt demonstrated improvement in treatment session compared to last seeing SLP, 1 weeks ago. Improved problem solving and intelligibility since last seeing this SLP. See above for more information. He continues to benefit from services to address his deficits. Pain:  Pre-Treatment:    0  Post-Treatment:  0    After treatment:   []       Patient assisted to gym for next session. [x]       Patient left in no apparent distress sitting up in chair. []       Patient left in no apparent distress in bed. []       Call bell left within reach. []       Nursing notified. []       Caregiver present. []       Bed alarm activated.     Estimated LOS: through 9/6/2019  Discharge recommendations: Bharti Gonzalez [address], CCC-SLP  Time Calculation: 60 mins

## 2019-08-31 NOTE — PROGRESS NOTES
Progress Note    Patient: Alee Arguelles MRN: [id number]  CSN: [id number]    YOB: 1955  Age: 71 y.o. Sex: male    DOA: 8/15/2019 LOS:  LOS: 16 days                    Subjective:     No acute patient complaints today. Primary Rehab Diagnosis: Impaired Mobility and ADLs secondary to Acute Ischemic Stroke (acute/early subacute infarct at the left posterior basal ganglia to corona radiata) with residual right hemiparesis, dysphagia and dysarthria    Review of systems  General: No fevers or chills. Cardiovascular: No chest pain or pressure. No palpitations. Pulmonary: No shortness of breath, cough or wheeze. Gastrointestinal: No abdominal pain, nausea, vomiting or diarrhea. Genitourinary: No urinary frequency, urgency, hesitancy or dysuria. Musculoskeletal: No joint or muscle pain, no back pain, no recent trauma. Neurologic: +right sided weakness and difficulty with speech and swallow. Objective:     Physical Exam:  Visit Vitals  /55   Pulse (!) 50   Temp 98 °F (36.7 °C)   Resp 17   Ht 5' 9\" (1.753 m)   Wt 102 kg (224 lb 14.4 oz)   SpO2 99%   BMI 33.21 kg/m²        General:         Alert, cooperative, no acute distress    HEENT: NC, Atraumatic. Lungs: CTA Bilaterally. No Wheezing/Rhonchi/Rales. Heart:  Regular  rhythm,  No murmur, No Rubs, No Gallops  Abdomen: Soft, Non distended, Non tender.  +Bowel sounds, no HSM  Extremities: R ankle swelling, no ttp  Psych:   Not anxious or agitated. Neurologic:  Alert and oriented X 3. Right sided facial droop, remaining CNII-XII intact. Strenght LUE and LLE is 5/5, 1/5 R upper arm/shoulder, 0/5 with RUE hand, 2/5 RLE thigh, 0/5 RLE foot. Intake and Output:  Current Shift:  No intake/output data recorded.   Last three shifts:  08/29 1901 - 08/31 0700  In: 240 [P.O.:240]  Out: 1425 [Urine:1425]    Labs: Results:       Chemistry Recent Labs     08/29/19  0606   GLU 91      K 4.1      CO2 24   BUN 36*   CREA 1.54*   CA 8.7 AGAP 9   BUCR 23*      CBC w/Diff Recent Labs     08/29/19  0606   HGB 8.0*   HCT 25.5*         Cardiac Enzymes No results for input(s): CPK, CKND1, DANY in the last 72 hours. No lab exists for component: CKRMB, TROIP   Coagulation No results for input(s): PTP, INR, APTT in the last 72 hours. No lab exists for component: INREXT    Lipid Panel Lab Results   Component Value Date/Time    Cholesterol, total 173 08/14/2019 03:42 AM    HDL Cholesterol 39 (L) 08/14/2019 03:42 AM    LDL, calculated 94 08/14/2019 03:42 AM    VLDL, calculated 40 08/14/2019 03:42 AM    Triglyceride 200 (H) 08/14/2019 03:42 AM    CHOL/HDL Ratio 4.4 08/14/2019 03:42 AM      BNP No results for input(s): BNPP in the last 72 hours. Liver Enzymes No results for input(s): TP, ALB, TBIL, AP, SGOT, GPT in the last 72 hours.     No lab exists for component: DBIL   Thyroid Studies Lab Results   Component Value Date/Time    TSH 4.63 (H) 08/19/2019 06:55 AM          Procedures/imaging: see electronic medical records for all procedures/Xrays and details which were not copied into this note but were reviewed prior to creation of Plan    Medications:   Current Facility-Administered Medications   Medication Dose Route Frequency    labetalol (NORMODYNE) tablet 600 mg  600 mg Oral Q12H    ferrous sulfate tablet 325 mg  1 Tab Oral DAILY WITH BREAKFAST    hydrALAZINE (APRESOLINE) tablet 75 mg  75 mg Oral Q8H    baclofen (LIORESAL) tablet 10 mg  10 mg Oral TID    spironolactone (ALDACTONE) tablet 50 mg  50 mg Oral BID    cyanocobalamin tablet 1,000 mcg  1,000 mcg Oral DAILY    cholecalciferol (VITAMIN D3) capsule 5,000 Units  5,000 Units Oral DAILY    isosorbide dinitrate (ISORDIL) tablet 30 mg  30 mg Oral Q8H    hydrALAZINE (APRESOLINE) tablet 25 mg  25 mg Oral TID PRN    acetaminophen (TYLENOL) tablet 650 mg  650 mg Oral Q4H PRN    bisacodyl (DULCOLAX) tablet 10 mg  10 mg Oral Q48H PRN    heparin (porcine) injection 5,000 Units  5,000 Units SubCUTAneous Q8H    insulin lispro (HUMALOG) injection   SubCUTAneous TIDAC    aspirin chewable tablet 81 mg  81 mg Oral DAILY WITH BREAKFAST    amLODIPine (NORVASC) tablet 10 mg  10 mg Oral DAILY    atorvastatin (LIPITOR) tablet 80 mg  80 mg Oral QHS       Assessment/Plan     Principal Problem:    Acute ischemic stroke (HCC) (8/12/2019)      Overview: Acute Ischemic Stroke (acute/early subacute infarct at the left posterior       basal ganglia to corona radiata) with residual right hemiparesis,       dysphagia and dysarthria    Active Problems:    Impaired mobility and ADLs (8/12/2019)      Received intravenous tissue plasminogen activator (tPA) in emergency department (8/12/2019)      Hypertensive heart and kidney disease without heart failure and with stage 2 chronic kidney disease ()      Right hemiparesis ([address] (8/12/2019)      Dysphagia (8/12/2019)      Dysarthria (8/12/2019)      Chronic hypokalemia ()      Overview: Persistent chronic hypokalemia + hypertension; ?primary hyperaldosteronism      Current use of aspirin (8/14/2019)      On statin therapy due to risk of future cardiovascular event (8/14/2019)      Overview: On Atorvastatin      Type 2 diabetes mellitus with stage 2 chronic kidney disease (HCC) ()      Overview: HbA1c (8/13/2019) = 6.6      Pure hypercholesterolemia (8/15/2019)      Overview: Lipid profile (8/13/2019) showed , , HDL 42, ; Lipid       profile (8/14/2019) showed , , HDL 39, LDL 94      Refusal of statin medication by patient (8/13/2019)      Overview: As per note of Neurology (Dr. Germain Villanueva), patient refuses statin agent       because of potential side effects.        Leukocytosis (8/16/2019)      Overview: WBC count (8/16/2019) = 17.9      Vitamin B12 deficiency anemia (8/14/2019)      Overview: Vitamin B12 (8/14/2019) = 208      Iron deficiency anemia (8/14/2019)      Overview: Anemia work-up (8/14/2019) showed serum iron 22, TIBC 371, iron %       saturation 6, ferritin 34, reticulocyte count 1.4            Vitamin D deficiency (8/19/2019)      Overview: Vitamin D 25-Hydroxy (8/19/2019) = 16.2       Primary osteoarthritis of right ankle (8/22/2019)      Overview: X-ray of the right ankle (8/22/2019) showed no acute fracture or       subluxation; advanced degenerative changes at the tibiotalar joint; old       fracture fragment vs. accessory ossicle in the inferior aspect of the       lateral malleolus; soft tissue swelling around the ankle.       Secondary hyperparathyroidism of renal origin ([facility] [address] (8/18/2019)      Overview: PTH (8/18/2019) = 101.7      Acute Ischemic Stroke (acute/early subacute infarct at the left posterior basal ganglia to corona radiata) with residual right hemiparesis, dysphagia and dysarthria; S/P Administration of intravenous tPA (8/12/2019 - [address])  Continue aspirin 81mg daily, atorvastatin 80mg qhs, baclofen 10mg PO TID  Continue PT/OT/ST    Chronic hypokalemia   Continue Spironolactone 50 mg PO BID  Monitor K+ qMonday and thursdays      Hypertensive heart and kidney disease without heart failure and with stage 2 chronic kidney disease  BP labile 130-160/50-70s  Plan to add ACE-I or ARB for proteinuria once renal function stable and serum creatinine and serum potassium are no longer rising upwards  At this time continues amlodipine 10mg, hydralazine 75mg q8, labetalol 600mg q12, isosorbide dinitrate 30mg q8, spirnolactone 50mg bid, hydralazine 25mg tid prn SBP > 170    Pure hypercholesterolemia  Continue Atorvastatin 80 mg PO q HS    Type 2 diabetes mellitus with stage 2 chronic kidney disease  Controlled  Continue Humalog insulin sliding scale SC TID AC     Leukocytosis, resolved   monitor     Iron deficiency anemia / Vitamin B12 anemia  Completed IV iron  Cyanocobalamin 1000mcg daily, ferrous sulfate 325mg daily     Vitamin D Deficiency  Continue Cholecalciferol 5,000 units PO once daily     Right ankle swelling, most likely dependent edema due to hemiparesis/lack of muscle contraction and osteoarthritis of the right ankle  Had  X-ray of the right ankle (8/22/2019) showed no acute fracture or subluxation; advanced degenerative changes at the tibiotalar joint; old fracture fragment vs. accessory ossicle in the inferior aspect of the lateral malleolus; soft tissue swelling around the ankle.   Elevate right leg/foot when supine in bed     Stage 2 chronic kidney disease --> Acute kidney injury superimposed on CKD stage 2 VS progression to Stage 3 chronic kidney disease  Nephrology consulted (Dr. Alex Martinez)     Restarted oral iron supplement, adjust medications for renal function  Possible ace-I for proteinuria as renal function allows         Gi Carver MD  8/31/2019 11:33 AM

## 2019-08-31 NOTE — ROUTINE PROCESS
SHIFT CHANGE NOTE FOR [facility]    Bedside and Verbal shift change report given to Jen Dunn RN (oncoming nurse) by Elvira Dickey RN   (offgoing nurse). Report included the following information SBAR, Kardex, MAR and Recent Results. Situation:   Code Status: Full Code   Reason for Admission: CVA  Hospital Day: 16   Problem List:   Hospital Problems  Date Reviewed: 8/30/2019          Codes Class Noted POA    Primary osteoarthritis of right ankle (Chronic) ICD-10-CM: M19.071  ICD-9-CM: 715.17  8/22/2019 Yes    Overview Signed 8/23/2019 11:54 AM by Fozia Webb MD     X-ray of the right ankle (8/22/2019) showed no acute fracture or subluxation; advanced degenerative changes at the tibiotalar joint; old fracture fragment vs. accessory ossicle in the inferior aspect of the lateral malleolus; soft tissue swelling around the ankle.              Vitamin D deficiency (Chronic) ICD-10-CM: E55.9  ICD-9-CM: 268.9  8/19/2019 Yes    Overview Signed 8/19/2019  1:34 PM by Fozia Webb MD     Vitamin D 25-Hydroxy (8/19/2019) = 16.2              Secondary hyperparathyroidism of renal origin ([facility] [address] (Chronic) ICD-10-CM: N25.81  ICD-9-CM: 588.81  8/18/2019 Yes    Overview Signed 8/28/2019  1:24 PM by Fozia Webb MD     PTH (8/18/2019) = 101.7             Leukocytosis ICD-10-CM: S61.312  ICD-9-CM: 288.60  8/16/2019 Yes    Overview Signed 8/16/2019 11:19 AM by Fozia Webb MD     WBC count (8/16/2019) = 17.9             Hypertensive heart and kidney disease without heart failure and with stage 2 chronic kidney disease (Chronic) ICD-10-CM: I13.10, N18.2  ICD-9-CM: 404.90, 585.2  Unknown Yes        Chronic hypokalemia ICD-10-CM: E87.6  ICD-9-CM: 276.8  Unknown Yes    Overview Addendum 8/15/2019 11:31 AM by Fozia Webb MD     Persistent chronic hypokalemia + hypertension; ?primary hyperaldosteronism             Type 2 diabetes mellitus with stage 2 chronic kidney disease (HCC) (Chronic) ICD-10-CM: E11.22, N18.2  ICD-9-CM: 250.40, 585.2  Unknown Yes    Overview Signed 8/15/2019 12:26 PM by Rhonda Singleton MD     HbA1c (8/13/2019) = 6.6             Pure hypercholesterolemia (Chronic) ICD-10-CM: E78.00  ICD-9-CM: 272.0  8/15/2019 Yes    Overview Addendum 8/15/2019  6:27 PM by Rhonda Singleton MD     Lipid profile (8/13/2019) showed , , HDL 42, ; Lipid profile (8/14/2019) showed , , HDL 39, LDL 94             Current use of aspirin ICD-10-CM: Z79.82  ICD-9-CM: V58.66  8/14/2019 Yes        On statin therapy due to risk of future cardiovascular event ICD-10-CM: Z79.899  ICD-9-CM: V58.69  8/14/2019 Yes    Overview Signed 8/15/2019 12:21 PM by Rhonda Singleton MD     On Atorvastatin             Vitamin B12 deficiency anemia (Chronic) ICD-10-CM: D51.9  ICD-9-CM: 281.1  8/14/2019 Yes    Overview Signed 8/19/2019  1:24 PM by Rhonda Singleton MD     Vitamin B12 (8/14/2019) = 208             Iron deficiency anemia (Chronic) ICD-10-CM: D50.9  ICD-9-CM: 280.9  8/14/2019 Yes    Overview Signed 8/19/2019  1:25 PM by Rhonda Singleton MD     Anemia work-up (8/14/2019) showed serum iron 22, TIBC 371, iron % saturation 6, ferritin 34, reticulocyte count 1.4               Refusal of statin medication by patient ICD-10-CM: Z53.29  ICD-9-CM: V64.2  8/13/2019 Yes    Overview Signed 8/15/2019  2:01 PM by Rhonda Singleton MD     As per note of Neurology (Dr. Casandra Reveles), patient refuses statin agent because of potential side effects.               * (Principal) Acute ischemic stroke [facility]) ICD-10-CM: I63.9  ICD-9-CM: 434.91  8/12/2019 Yes    Overview Signed 8/15/2019 12:17 AM by Rhonda Singleton MD     Acute Ischemic Stroke (acute/early subacute infarct at the left posterior basal ganglia to corona radiata) with residual right hemiparesis, dysphagia and dysarthria             Impaired mobility and ADLs ICD-10-CM: Z74.09  ICD-9-CM: 799.89  8/12/2019 Yes        Received intravenous tissue plasminogen activator (tPA) in emergency department ICD-10-CM: Z92.82  ICD-9-CM: V45.88  8/12/2019 Yes        Right hemiparesis ([facility] [address] ICD-10-CM: G81.91  ICD-9-CM: 342.90  8/12/2019 Yes        Dysphagia ICD-10-CM: R13.10  ICD-9-CM: 787.20  8/12/2019 Yes        Dysarthria ICD-10-CM: R47.1  ICD-9-CM: 784.51  8/12/2019 Yes              Background:   Past Medical History:   Past Medical History:   Diagnosis Date    Acute ischemic stroke ([facility] [address] 8/12/2019    Acute Ischemic Stroke (acute/early subacute infarct at the left posterior basal ganglia to corona radiata) with residual right hemiparesis, dysphagia and dysarthria    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     On Allopurinol    Chronic hypokalemia     Persistent chronic hypokalemia + hypertension; ?primary hyperaldosteronism    CKD (chronic kidney disease) stage 2, GFR 60-89 ml/min 8/15/2019    Current use of aspirin 8/14/2019    Dysarthria 8/12/2019    Dysphagia 8/12/2019    Elevated prostate specific antigen (PSA) 7/21/2011    First degree atrioventricular block by electrocardiogram 8/12/2019    Hypertensive heart and kidney disease without heart failure and with stage 2 chronic kidney disease     Iron deficiency anemia 8/14/2019    Anemia work-up (8/14/2019) showed serum iron 22, TIBC 371, iron % saturation 6, ferritin 34, reticulocyte count 1.4     Obesity, Class I, BMI 30-34.9     Obstructive sleep apnea on CPAP     On statin therapy due to risk of future cardiovascular event 8/14/2019    On Atorvastatin    Primary osteoarthritis of right ankle 8/22/2019    X-ray of the right ankle (8/22/2019) showed no acute fracture or subluxation; advanced degenerative changes at the tibiotalar joint; old fracture fragment vs. accessory ossicle in the inferior aspect of the lateral malleolus; soft tissue swelling around the ankle.     Pure hypercholesterolemia 08/15/2019    Lipid profile (8/13/2019) showed , , HDL 42, ; Lipid profile (8/14/2019) showed , TC 173, HDL 39, LDL 94    Received intravenous tissue plasminogen activator (tPA) in emergency department 8/12/2019    Refusal of statin medication by patient 8/13/2019    As per note of Neurology (Dr. Darryl Cardenas), patient refuses statin agent because of potential side effects.  Right hemiparesis ([facility] [address] 8/12/2019    Secondary hyperparathyroidism of renal origin ([facility] [address] 8/18/2019    PTH (8/18/2019) = 101.7    Type 2 diabetes mellitus with stage 2 chronic kidney disease (HCC)     HbA1c (8/13/2019) = 6.6    Vitamin B12 deficiency anemia 8/14/2019    Vitamin B12 (8/14/2019) = 208    Vitamin D deficiency 8/19/2019    Vitamin D 25-Hydroxy (8/19/2019) = 16.2       Patient taking anticoagulants yes Heparin, ASA   Patient has a defibrillator: no     Assessment:   Changes in Assessment throughout shift: No     Patient has central line: no Reasons if yes: Insertion date: Last dressing date:   Patient has Boykin Cath: no Reasons if yes:     Insertion date:     Last Vitals:     Vitals:    08/30/19 2140 08/31/19 0534 08/31/19 0747 08/31/19 1530   BP: 151/65 164/72 132/55 155/74   Pulse: 80 65 (!) 50 62   Resp: 18  17 18   Temp: 97 °F (36.1 °C)  98 °F (36.7 °C) 98.7 °F (37.1 °C)   SpO2: 97% 97% 99% 97%   Weight:       Height:            PAIN    Pain Assessment    Pain Intensity 1: 0 (08/31/19 1554) Pain Intensity 1: 2 (12/29/14 1105)    Pain Location 1: Shoulder Pain Location 1: Abdomen    Pain Intervention(s) 1: Medication (see MAR) Pain Intervention(s) 1: Medication (see MAR)  Patient Stated Pain Goal: 0 Patient Stated Pain Goal: 0  o Intervention effective: no    o Other actions taken for pain:      Skin Assessment  Skin color Skin Color: Appropriate for ethnicity  Condition/Temperature Skin Condition/Temp: Warm  Integrity Skin Integrity: Intact  Turgor Turgor: Non-tenting  Weekly Pressure Ulcer Documentation  Pressure  Injury Documentation: No Pressure Injury Noted-Pressure Ulcer Prevention Initiated  Wound Prevention & Protection Methods  Orientation of wound Orientation of Wound Prevention: Posterior  Location of Prevention Location of Wound Prevention: Sacrum/Coccyx  Dressing Present Dressing Present : No  Dressing Status Dressing Status: Intact  Wound Offloading Wound Offloading (Prevention Methods): Bed, pressure redistribution/air     INTAKE/OUPUT  Date 08/30/19 0700 - 08/31/19 0659 08/31/19 0700 - 09/01/19 0659   Shift 0700-1859 1900-0659 24 Hour Total 0700-1859 1900-0659 24 Hour Total   INTAKE   P.O. 240  240 420  420     P. O. 240  240 420  420   Shift Total(mL/kg) 240(2.4)  240(2.4) 420(4.1)  420(4.1)   OUTPUT   Urine(mL/kg/hr)  500(0.4) 500(0.2)        Urine Voided  500 500        Urine Occurrence(s) 5 x 3 x 8 x 5 x  5 x   Stool           Stool Occurrence(s) 1 x 0 x 1 x 3 x  3 x   Shift Total(mL/kg)  500(4.9) 500(4.9)       -500 -260 420  420   Weight (kg) 102 102 102 102 102 102       Recommendations:  1. Patient needs and requests:     2. Diet: Cardiac Mech Soft Nectar Thick     3. Pending tests/procedures:      4. Functional Level/Equipment:     5. Estimated Discharge Date: 8/24/19 Posted on Whiteboard in Patients Room: yes     HEALS Safety Check    A safety check occurred in the patient's room between off going nurse and oncoming nurse listed above.     The safety check included the below items  Area Items   H  High Alert Medications - Verify all high alert medication drips (heparin, PCA, etc.)   E  Equipment - Suction is set up for ALL patients (with yanker)  - Red plugs utilized for all equipment (IV pumps, etc.)  - WOWs wiped down at end of shift.  - Room stocked with oxygen, suction, and other unit-specific supplies   A  Alarms - Bed alarm is set for fall risk patients  - Ensure chair alarm is in place and activated if patient is up in a chair   L  Lines - Check IV for any infiltration  - Boykin bag is empty if patient has a Boykin   - Tubing and IV bags are labeled   S  Safety   - Room is clean, patient is clean, and equipment is clean. - Hallways are clear from equipment besides carts. - Fall bracelet on for fall risk patients  - Ensure room is clear and free of clutter  - Suction is set up for ALL patients (with dainn)  - Hallways are clear from equipment besides carts.    - Isolation precautions followed, supplies available outside room, sign posted

## 2019-08-31 NOTE — ROUTINE PROCESS
SHIFT CHANGE NOTE FOR [facility]    Bedside and Verbal shift change report given to James Suggs RN (oncoming nurse) by Jet Garduno   (offgoing nurse). Report included the following information SBAR, Kardex, MAR and Recent Results. Situation:   Code Status: Full Code   Reason for Admission: CVA  Hospital Day: 16   Problem List:   Hospital Problems  Date Reviewed: 8/30/2019          Codes Class Noted POA    Primary osteoarthritis of right ankle (Chronic) ICD-10-CM: M19.071  ICD-9-CM: 715.17  8/22/2019 Yes    Overview Signed 8/23/2019 11:54 AM by Karena Hartman MD     X-ray of the right ankle (8/22/2019) showed no acute fracture or subluxation; advanced degenerative changes at the tibiotalar joint; old fracture fragment vs. accessory ossicle in the inferior aspect of the lateral malleolus; soft tissue swelling around the ankle.              Vitamin D deficiency (Chronic) ICD-10-CM: E55.9  ICD-9-CM: 268.9  8/19/2019 Yes    Overview Signed 8/19/2019  1:34 PM by Karena Hartman MD     Vitamin D 25-Hydroxy (8/19/2019) = 16.2              Secondary hyperparathyroidism of renal origin ([facility] [address] (Chronic) ICD-10-CM: N25.81  ICD-9-CM: 588.81  8/18/2019 Yes    Overview Signed 8/28/2019  1:24 PM by Karena Hartman MD     PTH (8/18/2019) = 101.7             Leukocytosis ICD-10-CM: R63.597  ICD-9-CM: 288.60  8/16/2019 Yes    Overview Signed 8/16/2019 11:19 AM by Karena Hartman MD     WBC count (8/16/2019) = 17.9             Hypertensive heart and kidney disease without heart failure and with stage 2 chronic kidney disease (Chronic) ICD-10-CM: I13.10, N18.2  ICD-9-CM: 404.90, 585.2  Unknown Yes        Chronic hypokalemia ICD-10-CM: E87.6  ICD-9-CM: 276.8  Unknown Yes    Overview Addendum 8/15/2019 11:31 AM by Karena Hartman MD     Persistent chronic hypokalemia + hypertension; ?primary hyperaldosteronism             Type 2 diabetes mellitus with stage 2 chronic kidney disease (HCC) (Chronic) ICD-10-CM: E11.22, N18.2  ICD-9-CM: 250.40, 585.2  Unknown Yes    Overview Signed 8/15/2019 12:26 PM by Zeeshan Mcbride MD     HbA1c (8/13/2019) = 6.6             Pure hypercholesterolemia (Chronic) ICD-10-CM: E78.00  ICD-9-CM: 272.0  8/15/2019 Yes    Overview Addendum 8/15/2019  6:27 PM by Zeeshan Mcbride MD     Lipid profile (8/13/2019) showed , , HDL 42, ; Lipid profile (8/14/2019) showed , , HDL 39, LDL 94             Current use of aspirin ICD-10-CM: Z79.82  ICD-9-CM: V58.66  8/14/2019 Yes        On statin therapy due to risk of future cardiovascular event ICD-10-CM: Z79.899  ICD-9-CM: V58.69  8/14/2019 Yes    Overview Signed 8/15/2019 12:21 PM by Zeeshan Mcbride MD     On Atorvastatin             Vitamin B12 deficiency anemia (Chronic) ICD-10-CM: D51.9  ICD-9-CM: 281.1  8/14/2019 Yes    Overview Signed 8/19/2019  1:24 PM by Zeeshan Mcbride MD     Vitamin B12 (8/14/2019) = 208             Iron deficiency anemia (Chronic) ICD-10-CM: D50.9  ICD-9-CM: 280.9  8/14/2019 Yes    Overview Signed 8/19/2019  1:25 PM by Zeeshan Mcbride MD     Anemia work-up (8/14/2019) showed serum iron 22, TIBC 371, iron % saturation 6, ferritin 34, reticulocyte count 1.4               Refusal of statin medication by patient ICD-10-CM: Z53.29  ICD-9-CM: V64.2  8/13/2019 Yes    Overview Signed 8/15/2019  2:01 PM by Zeeshan Mcbride MD     As per note of Neurology (Dr. Daiana Galo), patient refuses statin agent because of potential side effects.               * (Principal) Acute ischemic stroke [facility]) ICD-10-CM: I63.9  ICD-9-CM: 434.91  8/12/2019 Yes    Overview Signed 8/15/2019 12:17 AM by Zeeshan Mcbride MD     Acute Ischemic Stroke (acute/early subacute infarct at the left posterior basal ganglia to corona radiata) with residual right hemiparesis, dysphagia and dysarthria             Impaired mobility and ADLs ICD-10-CM: Z74.09  ICD-9-CM: 799.89  8/12/2019 Yes        Received intravenous tissue plasminogen activator (tPA) in emergency department ICD-10-CM: Z92.82  ICD-9-CM: V45.88  8/12/2019 Yes        Right hemiparesis ([facility] [address] ICD-10-CM: G81.91  ICD-9-CM: 342.90  8/12/2019 Yes        Dysphagia ICD-10-CM: R13.10  ICD-9-CM: 787.20  8/12/2019 Yes        Dysarthria ICD-10-CM: R47.1  ICD-9-CM: 784.51  8/12/2019 Yes              Background:   Past Medical History:   Past Medical History:   Diagnosis Date    Acute ischemic stroke ([facility] [address] 8/12/2019    Acute Ischemic Stroke (acute/early subacute infarct at the left posterior basal ganglia to corona radiata) with residual right hemiparesis, dysphagia and dysarthria    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     On Allopurinol    Chronic hypokalemia     Persistent chronic hypokalemia + hypertension; ?primary hyperaldosteronism    CKD (chronic kidney disease) stage 2, GFR 60-89 ml/min 8/15/2019    Current use of aspirin 8/14/2019    Dysarthria 8/12/2019    Dysphagia 8/12/2019    Elevated prostate specific antigen (PSA) 7/21/2011    First degree atrioventricular block by electrocardiogram 8/12/2019    Hypertensive heart and kidney disease without heart failure and with stage 2 chronic kidney disease     Iron deficiency anemia 8/14/2019    Anemia work-up (8/14/2019) showed serum iron 22, TIBC 371, iron % saturation 6, ferritin 34, reticulocyte count 1.4     Obesity, Class I, BMI 30-34.9     Obstructive sleep apnea on CPAP     On statin therapy due to risk of future cardiovascular event 8/14/2019    On Atorvastatin    Primary osteoarthritis of right ankle 8/22/2019    X-ray of the right ankle (8/22/2019) showed no acute fracture or subluxation; advanced degenerative changes at the tibiotalar joint; old fracture fragment vs. accessory ossicle in the inferior aspect of the lateral malleolus; soft tissue swelling around the ankle.     Pure hypercholesterolemia 08/15/2019    Lipid profile (8/13/2019) showed , , HDL 42, ; Lipid profile (8/14/2019) showed , , HDL 39, LDL 94  Received intravenous tissue plasminogen activator (tPA) in emergency department 8/12/2019    Refusal of statin medication by patient 8/13/2019    As per note of Neurology (Dr. Sumit Atkinson), patient refuses statin agent because of potential side effects.  Right hemiparesis ([facility] [address] 8/12/2019    Secondary hyperparathyroidism of renal origin ([facility] [address] 8/18/2019    PTH (8/18/2019) = 101.7    Type 2 diabetes mellitus with stage 2 chronic kidney disease (HCC)     HbA1c (8/13/2019) = 6.6    Vitamin B12 deficiency anemia 8/14/2019    Vitamin B12 (8/14/2019) = 208    Vitamin D deficiency 8/19/2019    Vitamin D 25-Hydroxy (8/19/2019) = 16.2       Patient taking anticoagulants yes Heparin, ASA   Patient has a defibrillator: no     Assessment:   Changes in Assessment throughout shift: No     Patient has central line: no Reasons if yes: Insertion date: Last dressing date:   Patient has Boykin Cath: no Reasons if yes:     Insertion date:     Last Vitals:     Vitals:    08/30/19 1440 08/30/19 1601 08/30/19 2049 08/30/19 2140   BP: 147/71 161/78 151/65 151/65   Pulse: (!) 57 72 80 80   Resp: 18 18  18   Temp: 98.1 °F (36.7 °C) 97.1 °F (36.2 °C)  97 °F (36.1 °C)   SpO2: 100% 97%  97%   Weight:       Height:            PAIN    Pain Assessment    Pain Intensity 1: 0 (08/31/19 0357) Pain Intensity 1: 2 (12/29/14 1105)    Pain Location 1: Shoulder Pain Location 1: Abdomen    Pain Intervention(s) 1: Medication (see MAR) Pain Intervention(s) 1: Medication (see MAR)  Patient Stated Pain Goal: 0 Patient Stated Pain Goal: 0  o Intervention effective: no    o Other actions taken for pain:      Skin Assessment  Skin color Skin Color: Appropriate for ethnicity  Condition/Temperature Skin Condition/Temp: Warm  Integrity Skin Integrity: Intact  Turgor Turgor: Non-tenting  Weekly Pressure Ulcer Documentation  Pressure  Injury Documentation: No Pressure Injury Noted-Pressure Ulcer Prevention Initiated  Wound Prevention & Protection Methods  Orientation of wound Orientation of Wound Prevention: Posterior  Location of Prevention Location of Wound Prevention: Sacrum/Coccyx  Dressing Present Dressing Present : No  Dressing Status Dressing Status: Intact  Wound Offloading Wound Offloading (Prevention Methods): Bed, pressure redistribution/air, Bed, pressure reduction mattress     INTAKE/OUPUT  Date 08/30/19 0700 - 08/31/19 0659 08/31/19 0700 - 09/01/19 0659   Shift 0700-1859 1900-0659 24 Hour Total 0700-1859 1900-0659 24 Hour Total   INTAKE   P.O. 240  240        P. O. 240  240      Shift Total(mL/kg) 240(2.4)  240(2.4)      OUTPUT   Urine(mL/kg/hr)  500 500        Urine Voided  500 500        Urine Occurrence(s) 5 x 3 x 8 x      Stool           Stool Occurrence(s) 1 x 0 x 1 x      Shift Total(mL/kg)  500(4.9) 500(4.9)       -500 -260      Weight (kg) 102 102 102 102 102 102       Recommendations:  1. Patient needs and requests:     2. Diet: Cardiac Mech Soft Nectar Thick     3. Pending tests/procedures:      4. Functional Level/Equipment:     5. Estimated Discharge Date: 8/24/19 Posted on Whiteboard in Patients Room: yes     HEALS Safety Check    A safety check occurred in the patient's room between off going nurse and oncoming nurse listed above.     The safety check included the below items  Area Items   H  High Alert Medications - Verify all high alert medication drips (heparin, PCA, etc.)   E  Equipment - Suction is set up for ALL patients (with yanker)  - Red plugs utilized for all equipment (IV pumps, etc.)  - WOWs wiped down at end of shift.  - Room stocked with oxygen, suction, and other unit-specific supplies   A  Alarms - Bed alarm is set for fall risk patients  - Ensure chair alarm is in place and activated if patient is up in a chair   L  Lines - Check IV for any infiltration  - Boykin bag is empty if patient has a Boykin   - Tubing and IV bags are labeled   S  Safety   - Room is clean, patient is clean, and equipment is clean.  - Hallways are clear from equipment besides carts. - Fall bracelet on for fall risk patients  - Ensure room is clear and free of clutter  - Suction is set up for ALL patients (with diann)  - Hallways are clear from equipment besides carts.    - Isolation precautions followed, supplies available outside room, sign posted

## 2019-08-31 NOTE — PROGRESS NOTES
Problem: Mobility Impaired (Adult and Pediatric)  Goal: *Acute Goals and Plan of Care (Insert Text)  Description  Physical Therapy Short Term Goals  Initiated 8/16/2019 and to be accomplished within 14 day(s)  1. Patient will move from supine to sit and sit to supine  in bed with minimal assistance/contact guard assist. -met 8/30/19  2. Patient will transfer from bed to chair and chair to bed with minimal/moderate assistance using the least restrictive device. - met 8/30/19  3. Patient will perform sit to stand with minimal assistance/moderate assistance. - met 8/30/19  4. Patient will ambulate with moderate assistance  for 10 feet with the least restrictive device. -met 8/30/19    Physical Therapy Short Term Goals  Initiated 8/30/2019 and to be accomplished within 7 day(s)  1. Patient will move from supine to sit and sit to supine  in bed with supervision/set-up. 2.  Patient will transfer from bed to chair and chair to bed with minimal assistance/contact guard assist using the least restrictive device. 3.  Patient will perform sit to stand with minimal assistance/contact guard assist from w/c height. 4.  Patient will ambulate with minimal assistance/contact guard assist for 60 feet with the least restrictive device. Physical Therapy Goals  Initiated 8/16/2019 and to be accomplished within 4 weeks  1. Patient will move from supine to sit and sit to supine  in bed with supervision/set-up. 2.  Patient will transfer from bed to chair and chair to bed with supervision/set-up using the least restrictive device. 3.  Patient will perform sit to stand with supervision/set-up. 4.  Patient will ambulate with minimal assistance for 50 feet with the least restrictive device. 5.  Patient will ascend/descend 2 stairs with 1 handrail(s) with moderate assistance .         Outcome: Progressing Towards Goal    PHYSICAL THERAPY TREATMENT    Patient: Eusebio Wiley (80 y.o. male)  Date: 8/31/2019  Diagnosis: Acute ischemic stroke (HCC) [I63.9] Acute ischemic stroke [facility])  Precautions: Aspiration, NWB  Chart, physical therapy assessment, plan of care and goals were reviewed. Time In: 1130  Time Out: 6350  Patient Seen For: Balance activities; Patient education;Transfer training; Other (see progress notes)  Pain:  Pt pain was reported as  0/10 pre-treatment. Pt pain was reported as 0/10 post-treatment. Intervention: N/A    Patient identified with name and : YES    SUBJECTIVE:     Patient's wife is present and reports she just finished massaging and stretching his foot. They both state they were hoping patient would be getting therapy today and asked if he was on the schedule for tomorrow. OBJECTIVE DATA SUMMARY:   Objective: CNA took pt's BS as he left the room to go to the gym, WNL (see flowsheet). BED/MAT MOBILITY Daily Assessment   Patient was out of bed sitting in w/c, NT today. Rolling Right : 0 (Not tested)  Rolling Left : 0 (Not tested)  Supine to Sit : 0 (Not tested)  Sit to Supine : 0 (Not tested)       TRANSFERS Daily Assessment   Patient performed sit<>stand followed by stand step transfer from w/c<>mat table without AD and with Mod A for balance and safety. Patient requires max cuing for entire process, scooting, foot placement, hand placement with NDT method and engaging core/gluts and quads while standing. Once standing patient requires cuing for right knee extension and weight shift to the right. Transfer Type: Other  Other: stand step without AD  Transfer Assistance : 3 (Moderate assistance )  Sit to Stand Assistance: Moderate assistance  Car Transfers: Not tested       GAIT Daily Assessment   NT today d/t patient demonstrating difficulty with right knee extension and weight shifting during NMRE. Amount of Assistance: 0 (Not tested)  Distance (ft): 0 Feet (ft)       STEPS or STAIRS Daily Assessment   NT, not appropriate at this time.   Steps/Stairs Ambulated (#): 0  Level of Assist : 0 (Not tested)       BALANCE Daily Assessment   See NMRE Sitting - Static: Good (unsupported)  Sitting - Dynamic: Fair (occasional)  Standing - Static: Poor  Standing - Dynamic : Impaired       WHEELCHAIR MOBILITY Daily Assessment   Patient is able to propel his w/c using left karthik technique, after requiring education and assistance of managing leg/foot rests. Able to Propel (ft): 150 feet  Functional Level: 5  Curbs/Ramps Assist Required (FIM Score): 0 (Not tested)  Wheelchair Setup Assist Required : 3 (Moderate assistance)  Wheelchair Management: Manages left brake;Manages right brake(education on managing footrests)     Manual Therapy:  PROM to right ankle in all planes, DF S 3 x 30 seconds    Neuro Re-Education:  Patient performed sit to stand x 5 with max cuing for scooting, foot placement, NDT hand placement, engaging core/glutes and quads while standing and for performing right knee extension and shifting weight to the right once in standing. He also requires cuing to flex the right knee and for hand placement with stand to sit portion with slow controlled lowering. Patient requires Mod A. He then performed standing with [facility] in front of  mirror for visual feedback of COG, 3 times with Mod A for balance and safety and max cuing to maintain right knee extension and weight shift to the right. ASSESSMENT:  Patient is progressing slowly towards his goals however continues to require max cuing for sit to stand technique as well as max cuing for right knee extension and proper weight shift while standing. Progression toward goals:  ?      Improving appropriately and progressing toward goals  ? Improving slowly and progressing toward goals  ? Not making progress toward goals and plan of care will be adjusted     PLAN:  Patient continues to benefit from skilled intervention to address the above impairments. Continue treatment per established plan of care.   Discharge Recommendations:  Skilled Nursing Facility  Further Equipment Recommendations for Discharge:  N/A     Estimated LOS: 9/6/19    Activity Tolerance: Fair  Please refer to the flowsheet for vital signs taken during this treatment. After treatment:   Patient left propelling w/c under own power back to his room, wife present.       LORIE Mcnally  8/31/2019

## 2019-09-01 LAB
GLUCOSE BLD STRIP.AUTO-MCNC: 101 MG/DL (ref 70–110)
GLUCOSE BLD STRIP.AUTO-MCNC: 116 MG/DL (ref 70–110)
GLUCOSE BLD STRIP.AUTO-MCNC: 118 MG/DL (ref 70–110)
GLUCOSE BLD STRIP.AUTO-MCNC: 134 MG/DL (ref 70–110)

## 2019-09-01 PROCEDURE — 74011250637 HC RX REV CODE- 250/637: Performed by: INTERNAL MEDICINE

## 2019-09-01 PROCEDURE — 65310000000 HC RM PRIVATE REHAB

## 2019-09-01 PROCEDURE — 82962 GLUCOSE BLOOD TEST: CPT

## 2019-09-01 PROCEDURE — 74011250636 HC RX REV CODE- 250/636: Performed by: INTERNAL MEDICINE

## 2019-09-01 RX ADMIN — LABETALOL HYDROCHLORIDE 600 MG: 200 TABLET, FILM COATED ORAL at 06:18

## 2019-09-01 RX ADMIN — SPIRONOLACTONE 50 MG: 25 TABLET ORAL at 21:47

## 2019-09-01 RX ADMIN — HYDRALAZINE HYDROCHLORIDE 75 MG: 50 TABLET, FILM COATED ORAL at 06:18

## 2019-09-01 RX ADMIN — ISOSORBIDE DINITRATE 30 MG: 20 TABLET ORAL at 21:48

## 2019-09-01 RX ADMIN — ASPIRIN 81 MG 81 MG: 81 TABLET ORAL at 08:01

## 2019-09-01 RX ADMIN — BACLOFEN 10 MG: 10 TABLET ORAL at 17:24

## 2019-09-01 RX ADMIN — ISOSORBIDE DINITRATE 30 MG: 20 TABLET ORAL at 14:19

## 2019-09-01 RX ADMIN — HEPARIN SODIUM 5000 UNITS: 5000 INJECTION INTRAVENOUS; SUBCUTANEOUS at 21:48

## 2019-09-01 RX ADMIN — HYDRALAZINE HYDROCHLORIDE 75 MG: 50 TABLET, FILM COATED ORAL at 21:47

## 2019-09-01 RX ADMIN — HEPARIN SODIUM 5000 UNITS: 5000 INJECTION INTRAVENOUS; SUBCUTANEOUS at 06:19

## 2019-09-01 RX ADMIN — ATORVASTATIN CALCIUM 80 MG: 40 TABLET, FILM COATED ORAL at 21:52

## 2019-09-01 RX ADMIN — HEPARIN SODIUM 5000 UNITS: 5000 INJECTION INTRAVENOUS; SUBCUTANEOUS at 14:19

## 2019-09-01 RX ADMIN — HYDRALAZINE HYDROCHLORIDE 75 MG: 50 TABLET, FILM COATED ORAL at 14:19

## 2019-09-01 RX ADMIN — FERROUS SULFATE TAB 325 MG (65 MG ELEMENTAL FE) 325 MG: 325 (65 FE) TAB at 08:01

## 2019-09-01 RX ADMIN — Medication 5000 UNITS: at 08:01

## 2019-09-01 RX ADMIN — ISOSORBIDE DINITRATE 30 MG: 20 TABLET ORAL at 06:19

## 2019-09-01 RX ADMIN — BACLOFEN 10 MG: 10 TABLET ORAL at 06:19

## 2019-09-01 RX ADMIN — CYANOCOBALAMIN TAB 1000 MCG 1000 MCG: 1000 TAB at 08:01

## 2019-09-01 RX ADMIN — BACLOFEN 10 MG: 10 TABLET ORAL at 11:59

## 2019-09-01 RX ADMIN — LABETALOL HYDROCHLORIDE 600 MG: 200 TABLET, FILM COATED ORAL at 17:24

## 2019-09-01 RX ADMIN — SPIRONOLACTONE 50 MG: 25 TABLET ORAL at 08:00

## 2019-09-01 NOTE — ROUTINE PROCESS
SHIFT CHANGE NOTE FOR [facility]    Bedside and Verbal shift change report given to Lizy Mondragon RN (oncoming nurse) by Geeta Simeon RN   (offgoing nurse). Report included the following information SBAR, Kardex, MAR and Recent Results. Situation:   Code Status: Full Code   Reason for Admission: CVA  Hospital Day: 17   Problem List:   Hospital Problems  Date Reviewed: 8/30/2019          Codes Class Noted POA    Primary osteoarthritis of right ankle (Chronic) ICD-10-CM: M19.071  ICD-9-CM: 715.17  8/22/2019 Yes    Overview Signed 8/23/2019 11:54 AM by Jay Jay Childers MD     X-ray of the right ankle (8/22/2019) showed no acute fracture or subluxation; advanced degenerative changes at the tibiotalar joint; old fracture fragment vs. accessory ossicle in the inferior aspect of the lateral malleolus; soft tissue swelling around the ankle.              Vitamin D deficiency (Chronic) ICD-10-CM: E55.9  ICD-9-CM: 268.9  8/19/2019 Yes    Overview Signed 8/19/2019  1:34 PM by Jay Jay Childers MD     Vitamin D 25-Hydroxy (8/19/2019) = 16.2              Secondary hyperparathyroidism of renal origin ([facility] [address] (Chronic) ICD-10-CM: N25.81  ICD-9-CM: 588.81  8/18/2019 Yes    Overview Signed 8/28/2019  1:24 PM by Jay Jay Childers MD     PTH (8/18/2019) = 101.7             Leukocytosis ICD-10-CM: K74.312  ICD-9-CM: 288.60  8/16/2019 Yes    Overview Signed 8/16/2019 11:19 AM by Jay Jay Childers MD     WBC count (8/16/2019) = 17.9             Hypertensive heart and kidney disease without heart failure and with stage 2 chronic kidney disease (Chronic) ICD-10-CM: I13.10, N18.2  ICD-9-CM: 404.90, 585.2  Unknown Yes        Chronic hypokalemia ICD-10-CM: E87.6  ICD-9-CM: 276.8  Unknown Yes    Overview Addendum 8/15/2019 11:31 AM by Jay Jay Childers MD     Persistent chronic hypokalemia + hypertension; ?primary hyperaldosteronism             Type 2 diabetes mellitus with stage 2 chronic kidney disease (HCC) (Chronic) ICD-10-CM: E11.22, N18.2  ICD-9-CM: 250.40, 585.2  Unknown Yes    Overview Signed 8/15/2019 12:26 PM by Marcio Alva MD     HbA1c (8/13/2019) = 6.6             Pure hypercholesterolemia (Chronic) ICD-10-CM: E78.00  ICD-9-CM: 272.0  8/15/2019 Yes    Overview Addendum 8/15/2019  6:27 PM by Marcio Alva MD     Lipid profile (8/13/2019) showed , , HDL 42, ; Lipid profile (8/14/2019) showed , , HDL 39, LDL 94             Current use of aspirin ICD-10-CM: Z79.82  ICD-9-CM: V58.66  8/14/2019 Yes        On statin therapy due to risk of future cardiovascular event ICD-10-CM: Z79.899  ICD-9-CM: V58.69  8/14/2019 Yes    Overview Signed 8/15/2019 12:21 PM by Marcio Alva MD     On Atorvastatin             Vitamin B12 deficiency anemia (Chronic) ICD-10-CM: D51.9  ICD-9-CM: 281.1  8/14/2019 Yes    Overview Signed 8/19/2019  1:24 PM by Marcio Alva MD     Vitamin B12 (8/14/2019) = 208             Iron deficiency anemia (Chronic) ICD-10-CM: D50.9  ICD-9-CM: 280.9  8/14/2019 Yes    Overview Signed 8/19/2019  1:25 PM by Marcio Alva MD     Anemia work-up (8/14/2019) showed serum iron 22, TIBC 371, iron % saturation 6, ferritin 34, reticulocyte count 1.4               Refusal of statin medication by patient ICD-10-CM: Z53.29  ICD-9-CM: V64.2  8/13/2019 Yes    Overview Signed 8/15/2019  2:01 PM by Marcio Alva MD     As per note of Neurology (Dr. Jose Arreola), patient refuses statin agent because of potential side effects.               * (Principal) Acute ischemic stroke [facility]) ICD-10-CM: I63.9  ICD-9-CM: 434.91  8/12/2019 Yes    Overview Signed 8/15/2019 12:17 AM by Marcio Alva MD     Acute Ischemic Stroke (acute/early subacute infarct at the left posterior basal ganglia to corona radiata) with residual right hemiparesis, dysphagia and dysarthria             Impaired mobility and ADLs ICD-10-CM: Z74.09  ICD-9-CM: 799.89  8/12/2019 Yes        Received intravenous tissue plasminogen activator (tPA) in emergency department ICD-10-CM: Z92.82  ICD-9-CM: V45.88  8/12/2019 Yes        Right hemiparesis ([facility] [address] ICD-10-CM: G81.91  ICD-9-CM: 342.90  8/12/2019 Yes        Dysphagia ICD-10-CM: R13.10  ICD-9-CM: 787.20  8/12/2019 Yes        Dysarthria ICD-10-CM: R47.1  ICD-9-CM: 784.51  8/12/2019 Yes              Background:   Past Medical History:   Past Medical History:   Diagnosis Date    Acute ischemic stroke ([facility] [address] 8/12/2019    Acute Ischemic Stroke (acute/early subacute infarct at the left posterior basal ganglia to corona radiata) with residual right hemiparesis, dysphagia and dysarthria    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     On Allopurinol    Chronic hypokalemia     Persistent chronic hypokalemia + hypertension; ?primary hyperaldosteronism    CKD (chronic kidney disease) stage 2, GFR 60-89 ml/min 8/15/2019    Current use of aspirin 8/14/2019    Dysarthria 8/12/2019    Dysphagia 8/12/2019    Elevated prostate specific antigen (PSA) 7/21/2011    First degree atrioventricular block by electrocardiogram 8/12/2019    Hypertensive heart and kidney disease without heart failure and with stage 2 chronic kidney disease     Iron deficiency anemia 8/14/2019    Anemia work-up (8/14/2019) showed serum iron 22, TIBC 371, iron % saturation 6, ferritin 34, reticulocyte count 1.4     Obesity, Class I, BMI 30-34.9     Obstructive sleep apnea on CPAP     On statin therapy due to risk of future cardiovascular event 8/14/2019    On Atorvastatin    Primary osteoarthritis of right ankle 8/22/2019    X-ray of the right ankle (8/22/2019) showed no acute fracture or subluxation; advanced degenerative changes at the tibiotalar joint; old fracture fragment vs. accessory ossicle in the inferior aspect of the lateral malleolus; soft tissue swelling around the ankle.     Pure hypercholesterolemia 08/15/2019    Lipid profile (8/13/2019) showed , , HDL 42, ; Lipid profile (8/14/2019) showed , TC 173, HDL 39, LDL 94    Received intravenous tissue plasminogen activator (tPA) in emergency department 8/12/2019    Refusal of statin medication by patient 8/13/2019    As per note of Neurology (Dr. Mimi Silva), patient refuses statin agent because of potential side effects.  Right hemiparesis ([facility] [address] 8/12/2019    Secondary hyperparathyroidism of renal origin ([facility] [address] 8/18/2019    PTH (8/18/2019) = 101.7    Type 2 diabetes mellitus with stage 2 chronic kidney disease (HCC)     HbA1c (8/13/2019) = 6.6    Vitamin B12 deficiency anemia 8/14/2019    Vitamin B12 (8/14/2019) = 208    Vitamin D deficiency 8/19/2019    Vitamin D 25-Hydroxy (8/19/2019) = 16.2       Patient taking anticoagulants yes Heparin, ASA   Patient has a defibrillator: no     Assessment:   Changes in Assessment throughout shift: No     Patient has central line: no Reasons if yes: Insertion date: Last dressing date:   Patient has Boykin Cath: no Reasons if yes:     Insertion date:     Last Vitals:     Vitals:    08/31/19 2154 09/01/19 0616 09/01/19 0750 09/01/19 1612   BP: 147/63 178/82 112/57 133/59   Pulse: 60 60 60 (!) 55   Resp: 18  18 18   Temp: 98 °F (36.7 °C)  97.7 °F (36.5 °C) 97 °F (36.1 °C)   SpO2: 95%  94% 97%   Weight:       Height:            PAIN    Pain Assessment    Pain Intensity 1: 0 (09/01/19 1607) Pain Intensity 1: 2 (12/29/14 1105)    Pain Location 1: Shoulder Pain Location 1: Abdomen    Pain Intervention(s) 1: Medication (see MAR) Pain Intervention(s) 1: Medication (see MAR)  Patient Stated Pain Goal: 0 Patient Stated Pain Goal: 0  o Intervention effective: no    o Other actions taken for pain:      Skin Assessment  Skin color Skin Color: Appropriate for ethnicity  Condition/Temperature Skin Condition/Temp: Warm  Integrity Skin Integrity: Intact  Turgor Turgor: Non-tenting  Weekly Pressure Ulcer Documentation  Pressure  Injury Documentation: No Pressure Injury Noted-Pressure Ulcer Prevention Initiated  Wound Prevention & Protection Methods  Orientation of wound Orientation of Wound Prevention: Posterior  Location of Prevention Location of Wound Prevention: Sacrum/Coccyx  Dressing Present Dressing Present : No  Dressing Status Dressing Status: Intact  Wound Offloading Wound Offloading (Prevention Methods): Bed, pressure redistribution/air     INTAKE/OUPUT  Date 08/31/19 0700 - 09/01/19 0659 09/01/19 0700 - 09/02/19 0659   Shift 0700-1859 1900-0659 24 Hour Total 0700-1859 1900-0659 24 Hour Total   INTAKE   P.O. 420  420        P. O. 420  420      Shift Total(mL/kg) 420(4.1)  420(4.1)      OUTPUT   Urine(mL/kg/hr)  1400(1.1) 1400(0.6)        Urine Voided  1400 1400        Urine Occurrence(s) 5 x 7 x 12 x 3 x  3 x   Emesis/NG output           Emesis Occurrence(s)    0 x  0 x   Stool           Stool Occurrence(s) 3 x 1 x 4 x 1 x  1 x   Shift Total(mL/kg)  1400(13.7) 1400(13.7)       -1400 -980      Weight (kg) 102 102 102 102 102 102       Recommendations:  1. Patient needs and requests:     2. Diet: Cardiac Mech Soft Nectar Thick     3. Pending tests/procedures:      4. Functional Level/Equipment:     5. Estimated Discharge Date: 8/24/19 Posted on Whiteboard in Patients Room: yes     HEALS Safety Check    A safety check occurred in the patient's room between off going nurse and oncoming nurse listed above.     The safety check included the below items  Area Items   H  High Alert Medications - Verify all high alert medication drips (heparin, PCA, etc.)   E  Equipment - Suction is set up for ALL patients (with yanker)  - Red plugs utilized for all equipment (IV pumps, etc.)  - WOWs wiped down at end of shift.  - Room stocked with oxygen, suction, and other unit-specific supplies   A  Alarms - Bed alarm is set for fall risk patients  - Ensure chair alarm is in place and activated if patient is up in a chair   L  Lines - Check IV for any infiltration  - Boykin bag is empty if patient has a Boykin   - Tubing and IV bags are labeled   S  Safety   - Room is clean, patient is clean, and equipment is clean. - Hallways are clear from equipment besides carts. - Fall bracelet on for fall risk patients  - Ensure room is clear and free of clutter  - Suction is set up for ALL patients (with diann)  - Hallways are clear from equipment besides carts.    - Isolation precautions followed, supplies available outside room, sign posted

## 2019-09-01 NOTE — PROGRESS NOTES
Endocrinology Consultation Progress Note    Patient: Anand Eric Age: 71 y.o. : 1955 MR#: [id number] SSN: [id number]  Date: 2019     Subjective:    Patient seen at chair side today. Patient states that he is feeling better today, but is concerned about need for peripheral IV. Wife present and she states that she helped him with some physical therapy this morning. No acute overnight events. Assessment/Plan: This is a 69-year-old white man who was admitted for a acute ischemic stroke at the left posterior basal ganglia to the corona radiata and now is in acute inpatient rehab. He was found to be hypokalemic and hypertensive and endocrinology was consulted for concerns for hyperaldosteronism as a secondary cause of hypertension. Work-up has shown no hyperaldosteronism and hypokalemia most likely related to CKD secondary to his diabetes. He has responded well to spironolactone 50 mg p.o. twice daily and potassium is now currently at goal.  He does have an elevated PTH of 101.2 and this is thought to be a secondary hyperparathyroidism secondary to his chronic kidney disease. He was also found to be vitamin D deficient with a vitamin D 25 OH of 16.2 (this may have some contribution to his elevated PTH as well). He was started on cholecalciferol 5000 units p.o. daily. He was also shown to have a subclinical hypothyroidism, but since TSH is < 10, there is no medical indication to treat.    -Would continue spironolactone 50 mg p.o. twice daily as blood pressure tolerates.    -Would continue to check potassium to see if further adjustments need to be made with his Spironolactone.    -Would continue-vitamin D supplementation and recheck vitamin D 25 OH in approximately 12 weeks. If levels are greater than 30, then cholecalciferol can be reduced to 1000 units p.o. daily.    -Would continue current regimen for diabetes since he is well controlled with an A1c of 6.6%.       Take you for the consult, Will continue to follow patient with you while admitted to rehab. Case discussed with:  [x]Patient  [x]Family  []Nursing  []Attending  Time Spent: 40 minutes      Objective:   VS:   Visit Vitals  /57 (BP 1 Location: Left arm, BP Patient Position: Sitting)   Pulse 60   Temp 97.7 °F (36.5 °C)   Resp 18   Ht 5' 9\" (1.753 m)   Wt 102 kg (224 lb 14.4 oz)   SpO2 94%   BMI 33.21 kg/m²      Tmax/24hrs: Temp (24hrs), Av.1 °F (36.7 °C), Min:97.7 °F (36.5 °C), Max:98.7 °F (37.1 °C)      Intake/Output Summary (Last 24 hours) at 2019 1405  Last data filed at 2019 0236  Gross per 24 hour   Intake --   Output 1400 ml   Net -1400 ml     PE  General: Alert oriented x3, NAD  HEENT: Normocephalic, EOMI  Neuro: Right-sided facial droop present  Extremities:  unable to move right upper extremity but able to move right lower extremity  Speech: Clear and understandable  Psych: Good mood normal affect    ROS: As per subjective, otherwise negative    Labs:    Recent Results (from the past 24 hour(s))   GLUCOSE, POC    Collection Time: 19  4:43 PM   Result Value Ref Range    Glucose (POC) 112 (H) 70 - 110 mg/dL   GLUCOSE, POC    Collection Time: 19  8:06 PM   Result Value Ref Range    Glucose (POC) 144 (H) 70 - 110 mg/dL   GLUCOSE, POC    Collection Time: 19  7:46 AM   Result Value Ref Range    Glucose (POC) 116 (H) 70 - 110 mg/dL   GLUCOSE, POC    Collection Time: 19 11:59 AM   Result Value Ref Range    Glucose (POC) 101 70 - 110 mg/dL     Results for Anshul Katz (MRN [id number]) as of 2019 19:16   Ref.  Range 2019 06:09 2019 06:06   Sodium Latest Ref Range: 136 - 145 mmol/L 140 141   Potassium Latest Ref Range: 3.5 - 5.5 mmol/L 4.6 4.1   Chloride Latest Ref Range: 100 - 111 mmol/L 108 108   CO2 Latest Ref Range: 21 - 32 mmol/L 24 24   Anion gap Latest Ref Range: 3.0 - 18 mmol/L 8 9   Glucose Latest Ref Range: 74 - 99 mg/dL 88 91   BUN Latest Ref Range: 7.0 - 18 MG/DL 37 (H) 36 (H)   Creatinine Latest Ref Range: 0.6 - 1.3 MG/DL 1.69 (H) 1.54 (H)   BUN/Creatinine ratio Latest Ref Range: 12 - 20   22 (H) 23 (H)   Calcium Latest Ref Range: 8.5 - 10.1 MG/DL 8.3 (L) 8.7   Phosphorus Latest Ref Range: 2.5 - 4.9 MG/DL 5.4 (H)    Magnesium Latest Ref Range: 1.6 - 2.6 mg/dL 2.3    Results for Yomi Castro (MRN [id number]) as of 8/31/2019 19:16   Ref. Range 8/19/2019 06:55   Vitamin D 25-Hydroxy Latest Ref Range: 30 - 100 ng/mL 16.2 (L)   Results for Yomi Castro (MRN [id number]) as of 8/31/2019 19:16   Ref. Range 8/16/2019 06:30   Aldosterone Latest Ref Range: 0.0 - 30.0 ng/dL 8.8   Renin Activity Latest Ref Range: 0.167 - 5.380 ng/mL/hr 0.300     Component Value Flag Ref Range Units Status   Total volume 1,150    mL Corrected   Comment:   CORRECTED ON 08/21 AT 0945: PREVIOUSLY REPORTED AS 0   Period of collection 24    hr Corrected   Comment:   CORRECTED ON 08/21 AT 0945: PREVIOUSLY REPORTED AS 0   Aldosterone urine 11.11   Not Estab. ug/L Final   Comment:   (NOTE)   This test was developed and its performance characteristics   determined by [facility]. It has not been cleared or approved   by the Food and Drug Administration.     Aldosterone urine, 24hr 12.78   0.00 - 19.00 ug/24 hr Final   Comment:       Component Value Flag Ref Range Units Status   Calcium 8.6   8.5 - 10.1 MG/DL Final   PTH, Intact 101.7  High   18.4 - 88.0 pg/mL Final       Signed By: Yin James MD     September 1, 2019 7:04 PM

## 2019-09-01 NOTE — ROUTINE PROCESS
SHIFT CHANGE NOTE FOR [facility]    Bedside and Verbal shift change report given to Ita Chavis RN (oncoming nurse) by Gilson Pagan RN   (offgoing nurse). Report included the following information SBAR, Kardex, MAR and Recent Results. Situation:   Code Status: Full Code   Reason for Admission: CVA  Hospital Day: 17   Problem List:   Hospital Problems  Date Reviewed: 8/30/2019          Codes Class Noted POA    Primary osteoarthritis of right ankle (Chronic) ICD-10-CM: M19.071  ICD-9-CM: 715.17  8/22/2019 Yes    Overview Signed 8/23/2019 11:54 AM by Norma Gleason MD     X-ray of the right ankle (8/22/2019) showed no acute fracture or subluxation; advanced degenerative changes at the tibiotalar joint; old fracture fragment vs. accessory ossicle in the inferior aspect of the lateral malleolus; soft tissue swelling around the ankle.              Vitamin D deficiency (Chronic) ICD-10-CM: E55.9  ICD-9-CM: 268.9  8/19/2019 Yes    Overview Signed 8/19/2019  1:34 PM by Norma Gleason MD     Vitamin D 25-Hydroxy (8/19/2019) = 16.2              Secondary hyperparathyroidism of renal origin ([facility] [address] (Chronic) ICD-10-CM: N25.81  ICD-9-CM: 588.81  8/18/2019 Yes    Overview Signed 8/28/2019  1:24 PM by Norma Gleason MD     PTH (8/18/2019) = 101.7             Leukocytosis ICD-10-CM: Y79.066  ICD-9-CM: 288.60  8/16/2019 Yes    Overview Signed 8/16/2019 11:19 AM by Norma Gleason MD     WBC count (8/16/2019) = 17.9             Hypertensive heart and kidney disease without heart failure and with stage 2 chronic kidney disease (Chronic) ICD-10-CM: I13.10, N18.2  ICD-9-CM: 404.90, 585.2  Unknown Yes        Chronic hypokalemia ICD-10-CM: E87.6  ICD-9-CM: 276.8  Unknown Yes    Overview Addendum 8/15/2019 11:31 AM by Norma Gleason MD     Persistent chronic hypokalemia + hypertension; ?primary hyperaldosteronism             Type 2 diabetes mellitus with stage 2 chronic kidney disease (HCC) (Chronic) ICD-10-CM: E11.22, N18.2  ICD-9-CM: 250.40, 585.2  Unknown Yes    Overview Signed 8/15/2019 12:26 PM by Ramiro Storm MD     HbA1c (8/13/2019) = 6.6             Pure hypercholesterolemia (Chronic) ICD-10-CM: E78.00  ICD-9-CM: 272.0  8/15/2019 Yes    Overview Addendum 8/15/2019  6:27 PM by Ramiro Storm MD     Lipid profile (8/13/2019) showed , , HDL 42, ; Lipid profile (8/14/2019) showed , , HDL 39, LDL 94             Current use of aspirin ICD-10-CM: Z79.82  ICD-9-CM: V58.66  8/14/2019 Yes        On statin therapy due to risk of future cardiovascular event ICD-10-CM: Z79.899  ICD-9-CM: V58.69  8/14/2019 Yes    Overview Signed 8/15/2019 12:21 PM by Ramiro Storm MD     On Atorvastatin             Vitamin B12 deficiency anemia (Chronic) ICD-10-CM: D51.9  ICD-9-CM: 281.1  8/14/2019 Yes    Overview Signed 8/19/2019  1:24 PM by Ramiro Storm MD     Vitamin B12 (8/14/2019) = 208             Iron deficiency anemia (Chronic) ICD-10-CM: D50.9  ICD-9-CM: 280.9  8/14/2019 Yes    Overview Signed 8/19/2019  1:25 PM by Ramiro Storm MD     Anemia work-up (8/14/2019) showed serum iron 22, TIBC 371, iron % saturation 6, ferritin 34, reticulocyte count 1.4               Refusal of statin medication by patient ICD-10-CM: Z53.29  ICD-9-CM: V64.2  8/13/2019 Yes    Overview Signed 8/15/2019  2:01 PM by Ramiro Storm MD     As per note of Neurology (Dr. Char Conrad), patient refuses statin agent because of potential side effects.               * (Principal) Acute ischemic stroke [facility]) ICD-10-CM: I63.9  ICD-9-CM: 434.91  8/12/2019 Yes    Overview Signed 8/15/2019 12:17 AM by Ramiro Storm MD     Acute Ischemic Stroke (acute/early subacute infarct at the left posterior basal ganglia to corona radiata) with residual right hemiparesis, dysphagia and dysarthria             Impaired mobility and ADLs ICD-10-CM: Z74.09  ICD-9-CM: 799.89  8/12/2019 Yes        Received intravenous tissue plasminogen activator (tPA) in emergency department ICD-10-CM: Z92.82  ICD-9-CM: V45.88  8/12/2019 Yes        Right hemiparesis ([facility] [address] ICD-10-CM: G81.91  ICD-9-CM: 342.90  8/12/2019 Yes        Dysphagia ICD-10-CM: R13.10  ICD-9-CM: 787.20  8/12/2019 Yes        Dysarthria ICD-10-CM: R47.1  ICD-9-CM: 784.51  8/12/2019 Yes              Background:   Past Medical History:   Past Medical History:   Diagnosis Date    Acute ischemic stroke ([facility] [address] 8/12/2019    Acute Ischemic Stroke (acute/early subacute infarct at the left posterior basal ganglia to corona radiata) with residual right hemiparesis, dysphagia and dysarthria    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     On Allopurinol    Chronic hypokalemia     Persistent chronic hypokalemia + hypertension; ?primary hyperaldosteronism    CKD (chronic kidney disease) stage 2, GFR 60-89 ml/min 8/15/2019    Current use of aspirin 8/14/2019    Dysarthria 8/12/2019    Dysphagia 8/12/2019    Elevated prostate specific antigen (PSA) 7/21/2011    First degree atrioventricular block by electrocardiogram 8/12/2019    Hypertensive heart and kidney disease without heart failure and with stage 2 chronic kidney disease     Iron deficiency anemia 8/14/2019    Anemia work-up (8/14/2019) showed serum iron 22, TIBC 371, iron % saturation 6, ferritin 34, reticulocyte count 1.4     Obesity, Class I, BMI 30-34.9     Obstructive sleep apnea on CPAP     On statin therapy due to risk of future cardiovascular event 8/14/2019    On Atorvastatin    Primary osteoarthritis of right ankle 8/22/2019    X-ray of the right ankle (8/22/2019) showed no acute fracture or subluxation; advanced degenerative changes at the tibiotalar joint; old fracture fragment vs. accessory ossicle in the inferior aspect of the lateral malleolus; soft tissue swelling around the ankle.     Pure hypercholesterolemia 08/15/2019    Lipid profile (8/13/2019) showed , , HDL 42, ; Lipid profile (8/14/2019) showed , TC 173, HDL 39, LDL 94    Received intravenous tissue plasminogen activator (tPA) in emergency department 8/12/2019    Refusal of statin medication by patient 8/13/2019    As per note of Neurology (Dr. Sher Norton), patient refuses statin agent because of potential side effects.  Right hemiparesis ([facility] [address] 8/12/2019    Secondary hyperparathyroidism of renal origin ([facility] [address] 8/18/2019    PTH (8/18/2019) = 101.7    Type 2 diabetes mellitus with stage 2 chronic kidney disease (HCC)     HbA1c (8/13/2019) = 6.6    Vitamin B12 deficiency anemia 8/14/2019    Vitamin B12 (8/14/2019) = 208    Vitamin D deficiency 8/19/2019    Vitamin D 25-Hydroxy (8/19/2019) = 16.2       Patient taking anticoagulants yes Heparin, ASA   Patient has a defibrillator: no     Assessment:   Changes in Assessment throughout shift: No     Patient has central line: no Reasons if yes: Insertion date: Last dressing date:   Patient has Boykin Cath: no Reasons if yes:     Insertion date:     Last Vitals:     Vitals:    08/31/19 1530 08/31/19 2139 08/31/19 2154 09/01/19 0616   BP: 155/74 147/63 147/63 178/82   Pulse: 62 60 60 60   Resp: 18 18 18    Temp: 98.7 °F (37.1 °C) 98 °F (36.7 °C) 98 °F (36.7 °C)    SpO2: 97% 95% 95%    Weight:       Height:            PAIN    Pain Assessment    Pain Intensity 1: 0 (09/01/19 0616) Pain Intensity 1: 2 (12/29/14 1105)    Pain Location 1: Shoulder Pain Location 1: Abdomen    Pain Intervention(s) 1: Medication (see MAR) Pain Intervention(s) 1: Medication (see MAR)  Patient Stated Pain Goal: 0 Patient Stated Pain Goal: 0  o Intervention effective: no    o Other actions taken for pain:      Skin Assessment  Skin color Skin Color: Appropriate for ethnicity  Condition/Temperature Skin Condition/Temp: Warm  Integrity Skin Integrity: Intact  Turgor Turgor: Non-tenting  Weekly Pressure Ulcer Documentation  Pressure  Injury Documentation: No Pressure Injury Noted-Pressure Ulcer Prevention Initiated  Wound Prevention & Protection Methods  Orientation of wound Orientation of Wound Prevention: Posterior  Location of Prevention Location of Wound Prevention: Sacrum/Coccyx  Dressing Present Dressing Present : No  Dressing Status Dressing Status: Intact  Wound Offloading Wound Offloading (Prevention Methods): Bed, pressure redistribution/air     INTAKE/OUPUT  Date 08/31/19 0700 - 09/01/19 0659 09/01/19 0700 - 09/02/19 0659   Shift 0700-1859 1900-0659 24 Hour Total 0700-1859 1900-0659 24 Hour Total   INTAKE   P.O. 420  420        P. O. 420  420      Shift Total(mL/kg) 420(4.1)  420(4.1)      OUTPUT   Urine(mL/kg/hr)  1400(1.1) 1400(0.6)        Urine Voided  1400 1400        Urine Occurrence(s) 5 x 7 x 12 x      Stool           Stool Occurrence(s) 3 x 1 x 4 x      Shift Total(mL/kg)  1400(13.7) 1400(13.7)       -1400 -980      Weight (kg) 102 102 102 102 102 102       Recommendations:  1. Patient needs and requests:     2. Diet: Cardiac Mech Soft Nectar Thick     3. Pending tests/procedures:      4. Functional Level/Equipment:     5. Estimated Discharge Date: 8/24/19 Posted on Whiteboard in Patients Room: yes     HEALS Safety Check    A safety check occurred in the patient's room between off going nurse and oncoming nurse listed above.     The safety check included the below items  Area Items   H  High Alert Medications - Verify all high alert medication drips (heparin, PCA, etc.)   E  Equipment - Suction is set up for ALL patients (with yanker)  - Red plugs utilized for all equipment (IV pumps, etc.)  - WOWs wiped down at end of shift.  - Room stocked with oxygen, suction, and other unit-specific supplies   A  Alarms - Bed alarm is set for fall risk patients  - Ensure chair alarm is in place and activated if patient is up in a chair   L  Lines - Check IV for any infiltration  - Boykin bag is empty if patient has a Boykin   - Tubing and IV bags are labeled   S  Safety   - Room is clean, patient is clean, and equipment is clean.  - Hallways are clear from equipment besides carts. - Fall bracelet on for fall risk patients  - Ensure room is clear and free of clutter  - Suction is set up for ALL patients (with diann)  - Hallways are clear from equipment besides carts.    - Isolation precautions followed, supplies available outside room, sign posted

## 2019-09-01 NOTE — PROGRESS NOTES
Progress Note    Patient: Clara Castrejon MRN: [id number]  CSN: [id number]    YOB: 1955  Age: 71 y.o. Sex: male    DOA: 8/15/2019 LOS:  LOS: 17 days                    Subjective:     No acute patient complaints today. Patient wife at bedside helping with range of motion of LUE. Primary Rehab Diagnosis: Impaired Mobility and ADLs secondary to Acute Ischemic Stroke (acute/early subacute infarct at the left posterior basal ganglia to corona radiata) with residual right hemiparesis, dysphagia and dysarthria    Review of systems  General: No fevers or chills. Cardiovascular: No chest pain or pressure. No palpitations. Pulmonary: No shortness of breath, cough or wheeze. Gastrointestinal: No abdominal pain, nausea, vomiting or diarrhea. Genitourinary: No urinary frequency, urgency, hesitancy or dysuria. Musculoskeletal: No joint or muscle pain, no back pain, no recent trauma. Neurologic: +right sided weakness and difficulty with speech and swallow. Objective:     Physical Exam:  Visit Vitals  /57 (BP 1 Location: Left arm, BP Patient Position: Sitting)   Pulse 60   Temp 97.7 °F (36.5 °C)   Resp 18   Ht 5' 9\" (1.753 m)   Wt 102 kg (224 lb 14.4 oz)   SpO2 94%   BMI 33.21 kg/m²        General:         Alert, cooperative, no acute distress    HEENT: NC, Atraumatic. Lungs: CTA Bilaterally. No Wheezing/Rhonchi/Rales. Heart:  Regular  rhythm,  No murmur, No Rubs, No Gallops  Abdomen: Soft, Non distended, Non tender.  +Bowel sounds, no HSM  Extremities: R ankle edema 1+ no ttp, no calf ttp  Psych:   Not anxious or agitated. Neurologic:  Alert and oriented X 3. Right sided facial droop, remaining CNII-XII intact. Strenght LUE and LLE is 5/5, 1/5 R upper arm/shoulder, 0/5 with RUE hand, 2/5 RLE thigh, 0/5 RLE foot. Intake and Output:  Current Shift:  No intake/output data recorded.   Last three shifts:  08/30 1901 - 09/01 0700  In: 5 [P.O.:420]  Out: 1900 [Urine:1900]    Labs: Results:       Chemistry No results for input(s): GLU, NA, K, CL, CO2, BUN, CREA, CA, AGAP, BUCR, TBIL, GPT, AP, TP, ALB, GLOB, AGRAT in the last 72 hours. CBC w/Diff No results for input(s): WBC, RBC, HGB, HCT, PLT, GRANS, LYMPH, EOS, HGBEXT, HCTEXT, PLTEXT, HGBEXT, HCTEXT, PLTEXT in the last 72 hours. Cardiac Enzymes No results for input(s): CPK, CKND1, DANY in the last 72 hours. No lab exists for component: CKRMB, TROIP   Coagulation No results for input(s): PTP, INR, APTT in the last 72 hours. No lab exists for component: INREXT, INREXT    Lipid Panel Lab Results   Component Value Date/Time    Cholesterol, total 173 08/14/2019 03:42 AM    HDL Cholesterol 39 (L) 08/14/2019 03:42 AM    LDL, calculated 94 08/14/2019 03:42 AM    VLDL, calculated 40 08/14/2019 03:42 AM    Triglyceride 200 (H) 08/14/2019 03:42 AM    CHOL/HDL Ratio 4.4 08/14/2019 03:42 AM      BNP No results for input(s): BNPP in the last 72 hours. Liver Enzymes No results for input(s): TP, ALB, TBIL, AP, SGOT, GPT in the last 72 hours.     No lab exists for component: DBIL   Thyroid Studies Lab Results   Component Value Date/Time    TSH 4.63 (H) 08/19/2019 06:55 AM          Procedures/imaging: see electronic medical records for all procedures/Xrays and details which were not copied into this note but were reviewed prior to creation of Plan    Medications:   Current Facility-Administered Medications   Medication Dose Route Frequency    labetalol (NORMODYNE) tablet 600 mg  600 mg Oral Q12H    ferrous sulfate tablet 325 mg  1 Tab Oral DAILY WITH BREAKFAST    hydrALAZINE (APRESOLINE) tablet 75 mg  75 mg Oral Q8H    baclofen (LIORESAL) tablet 10 mg  10 mg Oral TID    spironolactone (ALDACTONE) tablet 50 mg  50 mg Oral BID    cyanocobalamin tablet 1,000 mcg  1,000 mcg Oral DAILY    cholecalciferol (VITAMIN D3) capsule 5,000 Units  5,000 Units Oral DAILY    isosorbide dinitrate (ISORDIL) tablet 30 mg  30 mg Oral Q8H    hydrALAZINE (APRESOLINE) tablet 25 mg  25 mg Oral TID PRN    acetaminophen (TYLENOL) tablet 650 mg  650 mg Oral Q4H PRN    bisacodyl (DULCOLAX) tablet 10 mg  10 mg Oral Q48H PRN    heparin (porcine) injection 5,000 Units  5,000 Units SubCUTAneous Q8H    insulin lispro (HUMALOG) injection   SubCUTAneous TIDAC    aspirin chewable tablet 81 mg  81 mg Oral DAILY WITH BREAKFAST    amLODIPine (NORVASC) tablet 10 mg  10 mg Oral DAILY    atorvastatin (LIPITOR) tablet 80 mg  80 mg Oral QHS       Assessment/Plan     Principal Problem:    Acute ischemic stroke ([facility]) (8/12/2019)      Overview: Acute Ischemic Stroke (acute/early subacute infarct at the left posterior       basal ganglia to corona radiata) with residual right hemiparesis,       dysphagia and dysarthria    Active Problems:    Impaired mobility and ADLs (8/12/2019)      Received intravenous tissue plasminogen activator (tPA) in emergency department (8/12/2019)      Hypertensive heart and kidney disease without heart failure and with stage 2 chronic kidney disease ()      Right hemiparesis ([facility]) (8/12/2019)      Dysphagia (8/12/2019)      Dysarthria (8/12/2019)      Chronic hypokalemia ()      Overview: Persistent chronic hypokalemia + hypertension; ?primary hyperaldosteronism      Current use of aspirin (8/14/2019)      On statin therapy due to risk of future cardiovascular event (8/14/2019)      Overview: On Atorvastatin      Type 2 diabetes mellitus with stage 2 chronic kidney disease (HCC) ()      Overview: HbA1c (8/13/2019) = 6.6      Pure hypercholesterolemia (8/15/2019)      Overview: Lipid profile (8/13/2019) showed , , HDL 42, ; Lipid       profile (8/14/2019) showed , , HDL 39, LDL 94      Refusal of statin medication by patient (8/13/2019)      Overview: As per note of Neurology (Dr. Daiana Galo), patient refuses statin agent       because of potential side effects.        Leukocytosis (8/16/2019)      Overview: WBC count (8/16/2019) = 17.9      Vitamin B12 deficiency anemia (8/14/2019)      Overview: Vitamin B12 (8/14/2019) = 208      Iron deficiency anemia (8/14/2019)      Overview: Anemia work-up (8/14/2019) showed serum iron 22, TIBC 371, iron %       saturation 6, ferritin 34, reticulocyte count 1.4            Vitamin D deficiency (8/19/2019)      Overview: Vitamin D 25-Hydroxy (8/19/2019) = 16.2       Primary osteoarthritis of right ankle (8/22/2019)      Overview: X-ray of the right ankle (8/22/2019) showed no acute fracture or       subluxation; advanced degenerative changes at the tibiotalar joint; old       fracture fragment vs. accessory ossicle in the inferior aspect of the       lateral malleolus; soft tissue swelling around the ankle.       Secondary hyperparathyroidism of renal origin ([facility] [address] (8/18/2019)      Overview: PTH (8/18/2019) = 101.7      Acute Ischemic Stroke (acute/early subacute infarct at the left posterior basal ganglia to corona radiata) with residual right hemiparesis, dysphagia and dysarthria; S/P Administration of intravenous tPA (8/12/2019 - [address])  Continue aspirin 81mg daily, atorvastatin 80mg qhs, baclofen 10mg PO TID  Continue PT/OT/ST    Chronic hypokalemia   Continue Spironolactone 50 mg PO BID  Monitor K+ qMonday and thursdays      Hypertensive heart and kidney disease without heart failure and with stage 2 chronic kidney disease  Plan to add ACE-I or ARB for proteinuria once renal function stable and serum creatinine and serum potassium are no longer rising upwards  At this time continues amlodipine 10mg, hydralazine 75mg q8, labetalol 600mg q12, isosorbide dinitrate 30mg q8, spirnolactone 50mg bid, hydralazine 25mg tid prn SBP > 170    Pure hypercholesterolemia  Continue Atorvastatin 80 mg PO q HS    Type 2 diabetes mellitus with stage 2 chronic kidney disease  Controlled  Continue Humalog insulin sliding scale SC TID AC     Leukocytosis, resolved monitor     Iron deficiency anemia / Vitamin B12 anemia  Off IV iron (pt elected to discontinue prior to full course) was started back on oral iron  Continue Cyanocobalamin 1000mcg daily, ferrous sulfate 325mg daily     Vitamin D Deficiency  Continue Cholecalciferol 5,000 units PO once daily     Right ankle swelling, most likely dependent edema due to hemiparesis/lack of muscle contraction and osteoarthritis of the right ankle  Had  X-ray of the right ankle (8/22/2019) showed no acute fracture or subluxation; advanced degenerative changes at the tibiotalar joint; old fracture fragment vs. accessory ossicle in the inferior aspect of the lateral malleolus; soft tissue swelling around the ankle.   Elevate right leg/foot when supine in bed     Stage 2 chronic kidney disease --> Acute kidney injury superimposed on CKD stage 2 VS progression to Stage 3 chronic kidney disease  Nephrology consulted (Dr. Red Leonard)     Restarted oral iron supplement, adjust medications for renal function  Possible ace-I for proteinuria as renal function allows         Yoon Rivas MD  9/1/2019 11:32am

## 2019-09-02 LAB
ANION GAP SERPL CALC-SCNC: 7 MMOL/L (ref 3–18)
BUN SERPL-MCNC: 39 MG/DL (ref 7–18)
BUN/CREAT SERPL: 23 (ref 12–20)
CALCIUM SERPL-MCNC: 8.5 MG/DL (ref 8.5–10.1)
CHLORIDE SERPL-SCNC: 108 MMOL/L (ref 100–111)
CO2 SERPL-SCNC: 25 MMOL/L (ref 21–32)
CREAT SERPL-MCNC: 1.69 MG/DL (ref 0.6–1.3)
GLUCOSE BLD STRIP.AUTO-MCNC: 102 MG/DL (ref 70–110)
GLUCOSE BLD STRIP.AUTO-MCNC: 138 MG/DL (ref 70–110)
GLUCOSE BLD STRIP.AUTO-MCNC: 142 MG/DL (ref 70–110)
GLUCOSE BLD STRIP.AUTO-MCNC: 90 MG/DL (ref 70–110)
GLUCOSE SERPL-MCNC: 89 MG/DL (ref 74–99)
HCT VFR BLD AUTO: 24 % (ref 36–48)
HGB BLD-MCNC: 7.6 G/DL (ref 13–16)
PLATELET # BLD AUTO: 241 K/UL (ref 135–420)
POTASSIUM SERPL-SCNC: 4.6 MMOL/L (ref 3.5–5.5)
SODIUM SERPL-SCNC: 140 MMOL/L (ref 136–145)

## 2019-09-02 PROCEDURE — 36415 COLL VENOUS BLD VENIPUNCTURE: CPT

## 2019-09-02 PROCEDURE — 74011250637 HC RX REV CODE- 250/637: Performed by: INTERNAL MEDICINE

## 2019-09-02 PROCEDURE — 97535 SELF CARE MNGMENT TRAINING: CPT

## 2019-09-02 PROCEDURE — 74011250636 HC RX REV CODE- 250/636: Performed by: INTERNAL MEDICINE

## 2019-09-02 PROCEDURE — 85018 HEMOGLOBIN: CPT

## 2019-09-02 PROCEDURE — 85049 AUTOMATED PLATELET COUNT: CPT

## 2019-09-02 PROCEDURE — 74011250636 HC RX REV CODE- 250/636: Performed by: FAMILY MEDICINE

## 2019-09-02 PROCEDURE — 92526 ORAL FUNCTION THERAPY: CPT

## 2019-09-02 PROCEDURE — 97530 THERAPEUTIC ACTIVITIES: CPT

## 2019-09-02 PROCEDURE — 92507 TX SP LANG VOICE COMM INDIV: CPT

## 2019-09-02 PROCEDURE — 74011000258 HC RX REV CODE- 258: Performed by: FAMILY MEDICINE

## 2019-09-02 PROCEDURE — 97112 NEUROMUSCULAR REEDUCATION: CPT

## 2019-09-02 PROCEDURE — 82962 GLUCOSE BLOOD TEST: CPT

## 2019-09-02 PROCEDURE — 65310000000 HC RM PRIVATE REHAB

## 2019-09-02 PROCEDURE — 80048 BASIC METABOLIC PNL TOTAL CA: CPT

## 2019-09-02 PROCEDURE — 97116 GAIT TRAINING THERAPY: CPT

## 2019-09-02 PROCEDURE — 97110 THERAPEUTIC EXERCISES: CPT

## 2019-09-02 RX ADMIN — BACLOFEN 10 MG: 10 TABLET ORAL at 06:19

## 2019-09-02 RX ADMIN — Medication 5000 UNITS: at 09:10

## 2019-09-02 RX ADMIN — HYDRALAZINE HYDROCHLORIDE 75 MG: 50 TABLET, FILM COATED ORAL at 06:19

## 2019-09-02 RX ADMIN — BACLOFEN 10 MG: 10 TABLET ORAL at 17:01

## 2019-09-02 RX ADMIN — HEPARIN SODIUM 5000 UNITS: 5000 INJECTION INTRAVENOUS; SUBCUTANEOUS at 06:17

## 2019-09-02 RX ADMIN — SPIRONOLACTONE 50 MG: 25 TABLET ORAL at 21:11

## 2019-09-02 RX ADMIN — ISOSORBIDE DINITRATE 30 MG: 20 TABLET ORAL at 06:18

## 2019-09-02 RX ADMIN — FERROUS SULFATE TAB 325 MG (65 MG ELEMENTAL FE) 325 MG: 325 (65 FE) TAB at 09:10

## 2019-09-02 RX ADMIN — ISOSORBIDE DINITRATE 30 MG: 20 TABLET ORAL at 21:09

## 2019-09-02 RX ADMIN — LABETALOL HYDROCHLORIDE 600 MG: 200 TABLET, FILM COATED ORAL at 17:01

## 2019-09-02 RX ADMIN — LABETALOL HYDROCHLORIDE 600 MG: 200 TABLET, FILM COATED ORAL at 06:18

## 2019-09-02 RX ADMIN — BACLOFEN 10 MG: 10 TABLET ORAL at 13:13

## 2019-09-02 RX ADMIN — HYDRALAZINE HYDROCHLORIDE 75 MG: 50 TABLET, FILM COATED ORAL at 21:11

## 2019-09-02 RX ADMIN — ASPIRIN 81 MG 81 MG: 81 TABLET ORAL at 09:10

## 2019-09-02 RX ADMIN — HEPARIN SODIUM 5000 UNITS: 5000 INJECTION INTRAVENOUS; SUBCUTANEOUS at 21:38

## 2019-09-02 RX ADMIN — CYANOCOBALAMIN TAB 1000 MCG 1000 MCG: 1000 TAB at 09:10

## 2019-09-02 RX ADMIN — HEPARIN SODIUM 5000 UNITS: 5000 INJECTION INTRAVENOUS; SUBCUTANEOUS at 13:19

## 2019-09-02 RX ADMIN — ISOSORBIDE DINITRATE 30 MG: 20 TABLET ORAL at 13:19

## 2019-09-02 RX ADMIN — HYDRALAZINE HYDROCHLORIDE 75 MG: 50 TABLET, FILM COATED ORAL at 13:19

## 2019-09-02 RX ADMIN — SPIRONOLACTONE 50 MG: 25 TABLET ORAL at 09:09

## 2019-09-02 RX ADMIN — IRON SUCROSE 200 MG: 20 INJECTION, SOLUTION INTRAVENOUS at 17:01

## 2019-09-02 RX ADMIN — ATORVASTATIN CALCIUM 80 MG: 40 TABLET, FILM COATED ORAL at 21:09

## 2019-09-02 NOTE — PROGRESS NOTES
Problem: Self Care Deficits Care Plan (Adult)  Goal: *Therapy Goal (Edit Goal, Insert Text)  Description  Occupational Therapy Goals   Short Term Goals=Long Term Goals  Initiated 8/16/2019 and to be accomplished within 4 week(s) (9/6/19)  1. Pt will perform self-feeding with MI.  2. Pt will perform grooming with Set-up. 3. Pt will perform UB bathing with SBA. 4. Pt will perform LB bathing with Luba/CGA. (; upgrade to SBA)  5. Pt will perform tub/shower transfer with Luba/CGA using LRAD. 6. Pt will perform UB dressing with Set-up. (; upgrade to MI)  7. Pt will perform LB dressing with Luba/CGA. (; upgrade to SBA)  8. Pt will perform toileting task with Luba/CGA. (; upgrade to SBA)  9. Pt will perform toilet transfer with Luba/CGA using LRAD. Short Term Goals   Initiated 8/16/2019 and to be accomplished within 7 day(s) (Updated 9/2)  1. Pt will perform self-feeding with S; using compensatory strategies for right hand incorporation into set-up. ()  2. Pt will perform simple grooming task, including denture mgmt, with Luba and compensatory strategies as needed. (; washing face- pt reporting spouse previously assisted with denture mgmt)  3. Pt will perform UB bathing with Luba using AE as needed. ()  4. Pt will perform LB bathing with ModA using AE and compensatory strategies as needed. ()  5. Pt will perform tub/shower transfer with modA x 1 <>TTB. ()  6. Pt will perform UB dressing with CGA. ()  7. Pt will perform LB dressing with ModA using AE as needed. ()  8. Pt will perform toileting task with ModA. ()  9. Pt will perform toilet transfer with ModA x 1 <>3:1 commode.  ()       Outcome: Progressing Towards Goal   OT WEEKLY PROGRESS NOTE  Patient Name:Deon Rios   Time Spent With Patient  Time In: 1100  Time Out: 1240  Patient Seen For[de-identified] AM;ADLs;Other (see progress notes)    Medical Diagnosis:  Acute ischemic stroke ([facility] [address] [I63.9] Acute ischemic stroke ([facility] [address]     Pain at start of tx: no c/o pain or discomfort. Pain at stop of tx: no c/o pain or discomfort. Patient identified with name and : yes  Subjective: \"I used to wipe [BM] with my right hand before all of this. \" Pt encouraged to I'ly perform BM hygiene with steadying (A) to increase (I) in sanitation to decrease caregiver burden. Nursing notified and pt agreeable. Objective: Pt approached for tx seated in w/c in therapy gym, pt agreeable to performing ADL this AM . Pt brought back to room for shower. Pt requiring re-education of strategies to incorporate BUEs into bathing as well as safety in functional transfers. After shower, pt brought to therapy gym for NMRE. Performed w/c>EOM with modA and poor eccentric control 2/2 to impulsiveness. Performed supine position with Michael. OT performing AAROM for against gravity elbow flexion with pt supine on mat with elbow flexed on mat. Performed x 10 repetitions with pt demo'ing bicep activation with max cues and muscle tapping. OT placing e-stim at level 24 x 10 minutes on right bicep muscle belly to increase activation for against gravity elbow flexion. OT providing stability (A) at wrist jt with pt instructed to bring right hand to right shoulder to facilitate elbow flexion. Pt increasing activation and control with repetitions. Pt performed EOM>w/c transfer with Michael and max cues for slowing down movements for good weight shifting and right LE movement.        Outcome Measures: FIM     AROM: WFL Left UE; Impaired right UE 2/2 to hemiparesis and trace muscle activation proximally       COGNITION/PERCEPTION Initial Assessment Weekly Progress Assessment 2019   Premorbid Reading Status Literate  Literate   Premorbid Writing Status [facility]   Arousal/Alertness Generalized responses  Generalized responses    Orientation Level Oriented X4 Oriented X4   Visual Fields (WFL visual tracking and saccades)  WFL    Praxis Impaired(Right UE 2/2 to hemiparesis )  Impaired right UE 2/2 to hemiparesis    Body Scheme Cues to attend to right side of body, Cues to maintain midline in sitting, Cues to maintain midline in standing, Tactile  Appears intact    COMPREHENSION MODE Initial Assessment Weekly Progress Assessment 9/2/2019   Primary Mode of Comprehension Auditory  Auditory   Hearing Aide       Corrective Lenses Reading glasses  Reading Glasses   Score 4  4     EXPRESSION Initial Assessment Weekly Progress Assessment 9/2/2019   Primary Mode of Expression Verbal Verbal   Score 4 4   Comments Pt requiring increased time and repetitions to make needs known 2/2 to facial droop impeding expression  Slight dysarthria noted in speech       SOCIAL INTERACTION/ PRAGMATICS Initial Assessment Weekly Progress Assessment 9/2/2019   Score 4 4   Comments Pt interacting appropriately with OT       PROBLEM SOLVING Initial Assessment Weekly Progress Assessment 9/2/2019   Score 4 4   Comments Pt requiring Michael for problem solving body mechanics prior to toileting transfer  Requiring cues and re education for techniques to problem solve washing axillas. MEMORY Initial Assessment Weekly Progress Assessment 9/2/2019   Score 4 4   Comments         EATING Initial Assessment Weekly Progress Assessment 9/2/2019   Functional Level 5 Feeding/Eating  Feeding/Eating Assistance: 5 (Supervision/setup)(Per last assessment)  Comments: Pt requiring (A) for opening containers, able to self feed with dominant left hand I'ly    Comments Pt reporting requiring set-up of container mgmt for self feeding. Pt able to use dominant left hand to manage utensils. GROOMING Initial Assessment Weekly Progress Assessment 9/2/2019   Functional Level 3 Grooming  Grooming Assistance : 5 (Supervision)  Comments: Pt washed face seated on tub bench with set-up of soap on wash cloth to wash face.     Tasks completed by patient Brushed teeth, Washed face Tasks Completed by Patient: Washed face   Comments Pt req modA for cleaning and managing dentures. Pt able to wash face with set-up. BATHING Initial Assessment Weekly Progress Assessment 9/2/2019   Functional Level 2 Functional Level: 4  Upper Body Bathing  Bathing Assistance, Upper: 4 (Minimal assistance)  Upper Body : Compensatory technique training;Training to use affected extremity as a gross motor assistance  Position Performed: Seated in chair  Adaptive Equipment: Grab bar;Long handled sponge; Tub bench  Comments: Pt re educated on placing right UE on right knee and bending forward to wash axilla. Pt required positioning for success. Pt required thoroughness wash for back after using LH sponge. Pt  re educated on placing wash cloth on left knee and bending forward/back to wash left UE as compensatory technique. Pt required (A) for reaching left axilla. Lower Body Bathing  Bathing Assistance, Lower : 4 (Minimal assistance)  Adaptive Equipment: Grab bar;Long handled sponge  Position Performed: Seated in chair  Adaptive Equipment: Tub bench  Comments: Pt washing BLEs with wash cloth and using Rampart to incorporate right UE into bathing. Pt utilizing LH sponge to reach ankles/feet. Pt required (A) to wash buttocks, performing forward flexion and having OT (A) to wash region. Body parts patient bathed Abdomen, Arm, right, Buttocks, Chest, Martha area, Thigh, left, Thigh, right Body Parts Patient Bathed: Abdomen;Arm, left;Arm, right;Chest;Lower leg and foot, left; Lower leg and foot, right;Martha area; Thigh, left; Thigh, right   Comments UB: modA LB: MaxA overall       TUB/SHOWER TRANSFER INDEPENDENCE Initial Assessment Weekly Progress Assessment 9/2/2019   Score 0 Functional Transfers  Toilet Transfer : Stand pivot transfer without device  Amount of Assistance Required: 4 (Minimal assistance)  Tub or Shower Type: Shower  Amount of Assistance Required: 3 (Moderate assistance)(Michael>TTB modA >w/c after shower)  Adaptive Equipment: Bedside commode;Grab bars; Tub transfer bench; Wheelchair   Comments NT 2/2 to safety and decreased functional transfer (I). Bathing performed at sink this AM       UPPER BODY DRESSING/UNDRESSING Initial Assessment Weekly Progress Assessment 9/2/2019   Functional Level 4 Upper Body Dressing   Dressing Assistance : 5 (Supervision)  Comments: Pt doffed shirt MI, donned shirt utilizing karthik technique with supervision   Items applied/Steps completed Pullover (4 steps) Items Applied/Steps Completed: Pullover (4 steps)   Comments Pt educated on karthik technique to cecily shirt with Michael overall. Pt doffed shirt with SBA. LOWER BODY DRESSING/UNDRESSING Initial Assessment Weekly Progress Assessment 9/2/2019   Functional Level 2 Lower Body Dressing   Dressing Assistance : 4 (Minimal assistance)(with increased time)  Leg Crossed Method Used: No  Position Performed: Seated in chair;Standing  Adaptive Equipment Used: Reacher;Sock aid  Comments: Pt doffed LB clothing and socks with use of reacher with SBA with initial cue for reacher placement for pants. Pt doffed socks with S. Pt donned socks with sock aide with increased time and SBA with 2x tactile cues and donned pants w/o AD with Michael in stand and increased time. Items applied/Steps completed Elastic waist pants (3 steps), Sock, left (1 step), Sock, right (1 step), Underpants (3 steps) Items Applied/Steps Completed: Elastic waist pants (3 steps); Sock, left (1 step); Sock, right (1 step)   Comments Pt doffed socks with modA, donned socks with TA. LB clothing score reflected from pt performing toileting task, requiring maxA for clothing mgmt        TOILETING Initial Assessment Weekly Progress Assessment 9/2/2019   Functional Level 2 Toileting  Toileting Assistance (FIM Score): 4 (Minimal assistance)(Michael-CGA)  Cues: Tactile cues provided  Adaptive Equipment: Elevated seat;Grab bars   Comments Pt able to initiate to wipe but requiring MaxA for thoroughness.  Pt required maxA overall for clothing mgmt with pt initiating to assist. Pt encouraged to focus on steadying balance in standing with additional person present assisting for clothing and buttocks cleansing. Pt provided with CGA-Michael steadying balance in stand with pt using left UE to wipe buttocks. Pt provided with 1x thoroughness swipe from OT for good hygiene. TOILET TRANSFER INDEPENDENCE Initial Assessment Weekly Progress Assessment 9/2/2019   Transfer score 1 Functional Transfers  Toilet Transfer : Stand pivot transfer without device  Amount of Assistance Required: 4 (Minimal assistance)  Tub or Shower Type: Shower  Amount of Assistance Required: 3 (Moderate assistance)(Michael>TTB modA >w/c after shower)  Adaptive Equipment: Bedside commode;Grab bars; Tub transfer bench; Wheelchair   Comments modA x 2. Second staff member present for safety and (A) with initial sit<>stand. ASSESSMENT:  Pt demo'ing progress towards goal attainment AEB meeting remaining STG and meeting 4x LTGs (goals upgraded appropriately). Pt requiring continued re education for incorporation of right UE into ADLs as well as slowing down movements for increased safety. Pt able to recall karthik techniques for dressing. Pt's spouse educated on [address] techniques pt can do out of therapy times 2/2 to spouse requesting exercises. Spouse and pt provided with visual demo of exercises with good understanding noted. Pt to continue progressing with ADL (I) and functional transfer (I) during ARU stay. Progression toward goals:  ?          Improving appropriately and progressing toward goals  ? Improving slowly and progressing toward goals  ? Not making progress toward goals and plan of care will be adjusted     PLAN:  Patient continues to benefit from skilled intervention to address the above impairments. Continue treatment per established plan of care.   Discharge Recommendations:  [facility]  Further Equipment Recommendations for Discharge:  Bariatric 3:1, TTB      Please refer to the flow sheet for vital signs taken during this treatment. After treatment:   ?  Patient left in no apparent distress sitting up in chair with spouse present bringing pt to dining room for lunch  ? Patient left in no apparent distress in bed  ? Call bell left within reach  ? Nursing notified  ? Caregiver present  ? Bed alarm activated    COMMUNICATION/EDUCATION:   ? Home safety education was provided and the patient/caregiver indicated understanding. ? Patient/family have participated as able in goal setting and plan of care. ? Patient/family agree to work toward stated goals and plan of care. ? Patient understands intent and goals of therapy, but is neutral about his/her participation. ? Patient is unable to participate in goal setting and plan of care. Plan of Care: Please see Care Plan for updated STG/LTGs.    Family Training:  On going with spouse present  Estimated LOS: 9/6/2019    [address] [facility],   9/2/2019

## 2019-09-02 NOTE — PROGRESS NOTES
Problem: Dysphagia (Adult)  Goal: *Speech Goal: (INSERT TEXT)  Description  Long term goals  Patient will:  1. Tolerate regular diet with thin liquids using safe swallowing techniques without s/s of aspiration in 5/6 meals. 2. Use safe swallowing techniques (including slowed rate, small bites/sips, alternate liquids/solids, effortful swallow, head rotation to right for liquids) with supervision. 3. Participate in MBS study prior to advancing to thin liquids to assure safety (no aspiration). 4. Perform oral and pharyngeal strengthening exercises (to improve speech and swallowing) with supervision-modified independence. 5. Demonstrate 90% intelligibility in conversation using appropriate speaking strategies (slowed rate, overarticulation, increased volume). 6. Recall 3 words after 5 minutes with supervision. 7. Perform functional problem solving/reasoning tasks with 90% accuracy. 8. Name 8-10 items in categories of increasing abstraction, supervision. Short term goals (by 9/6/19)  Patient will:   1. Tolerate soft diet with nectar thick liquids using safe swallowing techniques without s/s of aspiration in 5/6 meals. 2. Use safe swallowing techniques (including slowed rate, small bites/sips, alternate liquids/solids, effortful swallow, head rotation to right for liquids) with min cues. 3. Tolerate small amounts of ice chips and water with the SLP using head rotation to the right and/or chin tuck, without overt s/s of aspiration. 4. Perform oral and pharyngeal strengthening exercises (to improve speech and swallowing) with supervision. 5. Demonstrate 90% intelligibility in sentences using appropriate speaking strategies (slowed rate, overarticulation, increased volume). 6. Recall 3 words after 5 minutes with min assist.  7. Perform functional problem solving/reasoning tasks with 75-85% accuracy. 8. Name 8-10 items in concrete categories, supervision.        Note:   SPEECH LANGUAGE PATHOLOGY TREATMENT    Patient: Claudia Barnard (72 y.o. male)  Date: 9/2/2019  Diagnosis: Acute ischemic stroke ([facility] [address] [I63.9] [facility]       Time in: 0900 1000 1240  Time Out:  [id number]  SUBJECTIVE:   Patient stated Am I doing better? . OBJECTIVE:   Mental Status:  Mr. Christeen Favre was awake and alert throughout sessions. However, he continues to be quite impulsive. Treatment & Interventions:   Patient was seen in the dining room at mealtime for breakfast and lunch. He continues to receive and advanced soft diet with nectar thick liquids. This continues to be the most appropriate diet. Mr. Christeen Favre needs mod to at times min assist for use of safe swallowing techniques. He needs min -mod cues to verbalize these strategies. The most significant barrier to more success with his diet is his impulsivity and the speed at which he tries to feed himself. Patient again coughed on his eggs this morning. Today he was mashing the last of the eggs with his fork to get the last few bites. He did not completely clear these from his mouth prior to adding more and coughed on the small particles. He continues to take larger than recommended boluses and not clear residue from the right side of his mouth. Mr. Christeen Favre was also seen for a thirty minute speech therapy session this morning. The following treatment tasks were presented: Motor Speech:   Sentence drill:   85% intelligibility  Review speech strategies: Min assist  Review swallow strategies: Mod assist  Trials with small amts ice: Throat clearing on 1st bolus only  Trials with water:  Throat clearing 30-40% of boluses    Neuro-Linguistics:  Orientation:   Supervision  Recent memory:  Supervision  Reasoning tasks:  90% accuracy    Response & Tolerance to Activities:  Mr. Christeen Favre continues to make slow gains in treatment. However, his impulsivity and anxiety are barriers to more rapid progress.     Pain:  Pain Scale 1: Numeric (0 - 10)  No report of pain After treatment:   ?       Patient left in no apparent distress sitting up in chair  ? Patient left in no apparent distress in bed  ? Call bell left within reach  ? Nursing notified  ? Caregiver present  ? Bed alarm activated    ASSESSMENT:   Progression toward goals:  ?       Improving appropriately and progressing toward goals  ? Improving slowly and progressing toward goals  ? Not making progress toward goals and plan of care will be adjusted    PLAN:   Patient continues to benefit from skilled intervention to address the above impairments. Continue treatment per established plan of care.   Discharge Recommendations:  [facility]    Estimated LOS: Through 9/6/19    Keenan Grayson SLP  Time calculation:  90 minutes

## 2019-09-02 NOTE — ROUTINE PROCESS
SHIFT CHANGE NOTE FOR [facility]    Bedside and Verbal shift change report given to Kolby Chapman RN (oncoming nurse) by Deniz Sesay RN (offgoing nurse). Report included the following information SBAR, Kardex, MAR and Recent Results. Situation:   Code Status: Full Code   Reason for Admission: CVA  Hospital Day: 18   Problem List:   Hospital Problems  Date Reviewed: 8/30/2019          Codes Class Noted POA    Primary osteoarthritis of right ankle (Chronic) ICD-10-CM: M19.071  ICD-9-CM: 715.17  8/22/2019 Yes    Overview Signed 8/23/2019 11:54 AM by Alyssa Arita MD     X-ray of the right ankle (8/22/2019) showed no acute fracture or subluxation; advanced degenerative changes at the tibiotalar joint; old fracture fragment vs. accessory ossicle in the inferior aspect of the lateral malleolus; soft tissue swelling around the ankle.              Vitamin D deficiency (Chronic) ICD-10-CM: E55.9  ICD-9-CM: 268.9  8/19/2019 Yes    Overview Signed 8/19/2019  1:34 PM by Alyssa Arita MD     Vitamin D 25-Hydroxy (8/19/2019) = 16.2              Secondary hyperparathyroidism of renal origin ([facility] [address] (Chronic) ICD-10-CM: N25.81  ICD-9-CM: 588.81  8/18/2019 Yes    Overview Signed 8/28/2019  1:24 PM by Alyssa Arita MD     PTH (8/18/2019) = 101.7             Leukocytosis ICD-10-CM: B38.381  ICD-9-CM: 288.60  8/16/2019 Yes    Overview Signed 8/16/2019 11:19 AM by Alyssa Arita MD     WBC count (8/16/2019) = 17.9             Hypertensive heart and kidney disease without heart failure and with stage 2 chronic kidney disease (Chronic) ICD-10-CM: I13.10, N18.2  ICD-9-CM: 404.90, 585.2  Unknown Yes        Chronic hypokalemia ICD-10-CM: E87.6  ICD-9-CM: 276.8  Unknown Yes    Overview Addendum 8/15/2019 11:31 AM by Alyssa Arita MD     Persistent chronic hypokalemia + hypertension; ?primary hyperaldosteronism             Type 2 diabetes mellitus with stage 2 chronic kidney disease (HCC) (Chronic) ICD-10-CM: E11.22, N18.2  ICD-9-CM: 250.40, 585.2  Unknown Yes    Overview Signed 8/15/2019 12:26 PM by Christal Salazar MD     HbA1c (8/13/2019) = 6.6             Pure hypercholesterolemia (Chronic) ICD-10-CM: E78.00  ICD-9-CM: 272.0  8/15/2019 Yes    Overview Addendum 8/15/2019  6:27 PM by Christal Salazar MD     Lipid profile (8/13/2019) showed , , HDL 42, ; Lipid profile (8/14/2019) showed , , HDL 39, LDL 94             Current use of aspirin ICD-10-CM: Z79.82  ICD-9-CM: V58.66  8/14/2019 Yes        On statin therapy due to risk of future cardiovascular event ICD-10-CM: Z79.899  ICD-9-CM: V58.69  8/14/2019 Yes    Overview Signed 8/15/2019 12:21 PM by Christal Salazar MD     On Atorvastatin             Vitamin B12 deficiency anemia (Chronic) ICD-10-CM: D51.9  ICD-9-CM: 281.1  8/14/2019 Yes    Overview Signed 8/19/2019  1:24 PM by Christal Salazar MD     Vitamin B12 (8/14/2019) = 208             Iron deficiency anemia (Chronic) ICD-10-CM: D50.9  ICD-9-CM: 280.9  8/14/2019 Yes    Overview Signed 8/19/2019  1:25 PM by Christal Salazar MD     Anemia work-up (8/14/2019) showed serum iron 22, TIBC 371, iron % saturation 6, ferritin 34, reticulocyte count 1.4               Refusal of statin medication by patient ICD-10-CM: Z53.29  ICD-9-CM: V64.2  8/13/2019 Yes    Overview Signed 8/15/2019  2:01 PM by Christal Salazar MD     As per note of Neurology (Dr. Angelito Cruz), patient refuses statin agent because of potential side effects.               * (Principal) Acute ischemic stroke [facility]) ICD-10-CM: I63.9  ICD-9-CM: 434.91  8/12/2019 Yes    Overview Signed 8/15/2019 12:17 AM by Christal Salazar MD     Acute Ischemic Stroke (acute/early subacute infarct at the left posterior basal ganglia to corona radiata) with residual right hemiparesis, dysphagia and dysarthria             Impaired mobility and ADLs ICD-10-CM: Z74.09  ICD-9-CM: 799.89  8/12/2019 Yes        Received intravenous tissue plasminogen activator (tPA) in emergency department ICD-10-CM: Z92.82  ICD-9-CM: V45.88  8/12/2019 Yes        Right hemiparesis ([facility] [address] ICD-10-CM: G81.91  ICD-9-CM: 342.90  8/12/2019 Yes        Dysphagia ICD-10-CM: R13.10  ICD-9-CM: 787.20  8/12/2019 Yes        Dysarthria ICD-10-CM: R47.1  ICD-9-CM: 784.51  8/12/2019 Yes              Background:   Past Medical History:   Past Medical History:   Diagnosis Date    Acute ischemic stroke ([facility] [address] 8/12/2019    Acute Ischemic Stroke (acute/early subacute infarct at the left posterior basal ganglia to corona radiata) with residual right hemiparesis, dysphagia and dysarthria    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     On Allopurinol    Chronic hypokalemia     Persistent chronic hypokalemia + hypertension; ?primary hyperaldosteronism    CKD (chronic kidney disease) stage 2, GFR 60-89 ml/min 8/15/2019    Current use of aspirin 8/14/2019    Dysarthria 8/12/2019    Dysphagia 8/12/2019    Elevated prostate specific antigen (PSA) 7/21/2011    First degree atrioventricular block by electrocardiogram 8/12/2019    Hypertensive heart and kidney disease without heart failure and with stage 2 chronic kidney disease     Iron deficiency anemia 8/14/2019    Anemia work-up (8/14/2019) showed serum iron 22, TIBC 371, iron % saturation 6, ferritin 34, reticulocyte count 1.4     Obesity, Class I, BMI 30-34.9     Obstructive sleep apnea on CPAP     On statin therapy due to risk of future cardiovascular event 8/14/2019    On Atorvastatin    Primary osteoarthritis of right ankle 8/22/2019    X-ray of the right ankle (8/22/2019) showed no acute fracture or subluxation; advanced degenerative changes at the tibiotalar joint; old fracture fragment vs. accessory ossicle in the inferior aspect of the lateral malleolus; soft tissue swelling around the ankle.     Pure hypercholesterolemia 08/15/2019    Lipid profile (8/13/2019) showed , , HDL 42, ; Lipid profile (8/14/2019) showed , , HDL 39, LDL 94  Received intravenous tissue plasminogen activator (tPA) in emergency department 8/12/2019    Refusal of statin medication by patient 8/13/2019    As per note of Neurology (Dr. Shantal Purcell), patient refuses statin agent because of potential side effects.  Right hemiparesis ([facility] [address] 8/12/2019    Secondary hyperparathyroidism of renal origin ([facility] [address] 8/18/2019    PTH (8/18/2019) = 101.7    Type 2 diabetes mellitus with stage 2 chronic kidney disease (HCC)     HbA1c (8/13/2019) = 6.6    Vitamin B12 deficiency anemia 8/14/2019    Vitamin B12 (8/14/2019) = 208    Vitamin D deficiency 8/19/2019    Vitamin D 25-Hydroxy (8/19/2019) = 16.2       Patient taking anticoagulants yes Heparin, ASA   Patient has a defibrillator: no     Assessment:   Changes in Assessment throughout shift: No     Patient has central line: no Reasons if yes: Insertion date: Last dressing date:   Patient has Boykin Cath: no Reasons if yes:     Insertion date:     Last Vitals:     Vitals:    09/02/19 0605 09/02/19 0734 09/02/19 1319 09/02/19 1532   BP: 183/75 116/56 150/74 136/62   Pulse: 63 (!) 53 72 72   Resp:  20  18   Temp:  97.5 °F (36.4 °C)  99.1 °F (37.3 °C)   SpO2:  96%  98%   Weight:       Height:            PAIN    Pain Assessment    Pain Intensity 1: 0 (09/02/19 1600) Pain Intensity 1: 2 (12/29/14 1105)    Pain Location 1: Shoulder Pain Location 1: Abdomen    Pain Intervention(s) 1: Medication (see MAR) Pain Intervention(s) 1: Medication (see MAR)  Patient Stated Pain Goal: 0 Patient Stated Pain Goal: 0  o Intervention effective: no    o Other actions taken for pain:      Skin Assessment  Skin color Skin Color: Appropriate for ethnicity  Condition/Temperature Skin Condition/Temp: Dry, Warm  Integrity Skin Integrity: Intact  Turgor Turgor: Non-tenting  Weekly Pressure Ulcer Documentation  Pressure  Injury Documentation: No Pressure Injury Noted-Pressure Ulcer Prevention Initiated  Wound Prevention & Protection Methods  Orientation of wound Orientation of Wound Prevention: Posterior  Location of Prevention Location of Wound Prevention: Sacrum/Coccyx  Dressing Present Dressing Present : No  Dressing Status Dressing Status: Intact  Wound Offloading Wound Offloading (Prevention Methods): Bed, pressure reduction mattress     INTAKE/OUPUT  Date 09/01/19 1900 - 09/02/19 0659 09/02/19 0700 - 09/03/19 0659   Shift 6954-8468 24 Hour Total 1428-8691 6185-0951 24 Hour Total   INTAKE   P.O.   480  480     P. O.   480  480   Shift Total(mL/kg)   480(4.7)  480(4.7)   OUTPUT   Urine(mL/kg/hr) 2700 2700        Urine Voided 2700 2700        Urine Occurrence(s) 7 x 10 x 1 x 1 x 2 x   Emesis/NG output          Emesis Occurrence(s)  0 x      Stool          Stool Occurrence(s) 0 x 1 x 1 x 1 x 2 x   Shift Total(mL/kg) 2700(26.5) 9145(76.8)      NET -2700 -2700 480  480   Weight (kg) 102 102 102 102 102       Recommendations:  1. Patient needs and requests:     2. Diet: Cardiac Mech Soft Nectar Thick     3. Pending tests/procedures:      4. Functional Level/Equipment:     5. Estimated Discharge Date: 8/24/19 Posted on Whiteboard in Patients Room: yes     HEALS Safety Check    A safety check occurred in the patient's room between off going nurse and oncoming nurse listed above.     The safety check included the below items  Area Items   H  High Alert Medications - Verify all high alert medication drips (heparin, PCA, etc.)   E  Equipment - Suction is set up for ALL patients (with yanker)  - Red plugs utilized for all equipment (IV pumps, etc.)  - WOWs wiped down at end of shift.  - Room stocked with oxygen, suction, and other unit-specific supplies   A  Alarms - Bed alarm is set for fall risk patients  - Ensure chair alarm is in place and activated if patient is up in a chair   L  Lines - Check IV for any infiltration  - Boykin bag is empty if patient has a Boykin   - Tubing and IV bags are labeled   S  Safety   - Room is clean, patient is clean, and equipment is clean. - Hallways are clear from equipment besides carts. - Fall bracelet on for fall risk patients  - Ensure room is clear and free of clutter  - Suction is set up for ALL patients (with diann)  - Hallways are clear from equipment besides carts.    - Isolation precautions followed, supplies available outside room, sign posted

## 2019-09-02 NOTE — ROUTINE PROCESS
SHIFT CHANGE NOTE FOR [facility]    Bedside and Verbal shift change report given to Michi Andrade RN (oncoming nurse) by Tito Garcia RN (offgoing nurse). Report included the following information SBAR, Kardex, MAR and Recent Results. Situation:   Code Status: Full Code   Reason for Admission: CVA  Hospital Day: 18   Problem List:   Hospital Problems  Date Reviewed: 8/30/2019          Codes Class Noted POA    Primary osteoarthritis of right ankle (Chronic) ICD-10-CM: M19.071  ICD-9-CM: 715.17  8/22/2019 Yes    Overview Signed 8/23/2019 11:54 AM by Gwen Espinosa MD     X-ray of the right ankle (8/22/2019) showed no acute fracture or subluxation; advanced degenerative changes at the tibiotalar joint; old fracture fragment vs. accessory ossicle in the inferior aspect of the lateral malleolus; soft tissue swelling around the ankle.              Vitamin D deficiency (Chronic) ICD-10-CM: E55.9  ICD-9-CM: 268.9  8/19/2019 Yes    Overview Signed 8/19/2019  1:34 PM by Gwen Espinosa MD     Vitamin D 25-Hydroxy (8/19/2019) = 16.2              Secondary hyperparathyroidism of renal origin ([facility] [address] (Chronic) ICD-10-CM: N25.81  ICD-9-CM: 588.81  8/18/2019 Yes    Overview Signed 8/28/2019  1:24 PM by Gwen Espinosa MD     PTH (8/18/2019) = 101.7             Leukocytosis ICD-10-CM: H28.531  ICD-9-CM: 288.60  8/16/2019 Yes    Overview Signed 8/16/2019 11:19 AM by Gewn Espinosa MD     WBC count (8/16/2019) = 17.9             Hypertensive heart and kidney disease without heart failure and with stage 2 chronic kidney disease (Chronic) ICD-10-CM: I13.10, N18.2  ICD-9-CM: 404.90, 585.2  Unknown Yes        Chronic hypokalemia ICD-10-CM: E87.6  ICD-9-CM: 276.8  Unknown Yes    Overview Addendum 8/15/2019 11:31 AM by Gwen Espinosa MD     Persistent chronic hypokalemia + hypertension; ?primary hyperaldosteronism             Type 2 diabetes mellitus with stage 2 chronic kidney disease (HCC) (Chronic) ICD-10-CM: E11.22, N18.2  ICD-9-CM: 250.40, 585.2  Unknown Yes    Overview Signed 8/15/2019 12:26 PM by Gwen Espinosa MD     HbA1c (8/13/2019) = 6.6             Pure hypercholesterolemia (Chronic) ICD-10-CM: E78.00  ICD-9-CM: 272.0  8/15/2019 Yes    Overview Addendum 8/15/2019  6:27 PM by Gwen Espinosa MD     Lipid profile (8/13/2019) showed , , HDL 42, ; Lipid profile (8/14/2019) showed , , HDL 39, LDL 94             Current use of aspirin ICD-10-CM: Z79.82  ICD-9-CM: V58.66  8/14/2019 Yes        On statin therapy due to risk of future cardiovascular event ICD-10-CM: Z79.899  ICD-9-CM: V58.69  8/14/2019 Yes    Overview Signed 8/15/2019 12:21 PM by Gwen Espinosa MD     On Atorvastatin             Vitamin B12 deficiency anemia (Chronic) ICD-10-CM: D51.9  ICD-9-CM: 281.1  8/14/2019 Yes    Overview Signed 8/19/2019  1:24 PM by Gwen Espinosa MD     Vitamin B12 (8/14/2019) = 208             Iron deficiency anemia (Chronic) ICD-10-CM: D50.9  ICD-9-CM: 280.9  8/14/2019 Yes    Overview Signed 8/19/2019  1:25 PM by Gwen Espinosa MD     Anemia work-up (8/14/2019) showed serum iron 22, TIBC 371, iron % saturation 6, ferritin 34, reticulocyte count 1.4               Refusal of statin medication by patient ICD-10-CM: Z53.29  ICD-9-CM: V64.2  8/13/2019 Yes    Overview Signed 8/15/2019  2:01 PM by Gwen Espinosa MD     As per note of Neurology (Dr. Sumit Atkinson), patient refuses statin agent because of potential side effects.               * (Principal) Acute ischemic stroke [facility]) ICD-10-CM: I63.9  ICD-9-CM: 434.91  8/12/2019 Yes    Overview Signed 8/15/2019 12:17 AM by Gwen Espinosa MD     Acute Ischemic Stroke (acute/early subacute infarct at the left posterior basal ganglia to corona radiata) with residual right hemiparesis, dysphagia and dysarthria             Impaired mobility and ADLs ICD-10-CM: Z74.09  ICD-9-CM: 799.89  8/12/2019 Yes        Received intravenous tissue plasminogen activator (tPA) in emergency department ICD-10-CM: Z92.82  ICD-9-CM: V45.88  8/12/2019 Yes        Right hemiparesis ([facility] [address] ICD-10-CM: G81.91  ICD-9-CM: 342.90  8/12/2019 Yes        Dysphagia ICD-10-CM: R13.10  ICD-9-CM: 787.20  8/12/2019 Yes        Dysarthria ICD-10-CM: R47.1  ICD-9-CM: 784.51  8/12/2019 Yes              Background:   Past Medical History:   Past Medical History:   Diagnosis Date    Acute ischemic stroke ([facility] [address] 8/12/2019    Acute Ischemic Stroke (acute/early subacute infarct at the left posterior basal ganglia to corona radiata) with residual right hemiparesis, dysphagia and dysarthria    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     On Allopurinol    Chronic hypokalemia     Persistent chronic hypokalemia + hypertension; ?primary hyperaldosteronism    CKD (chronic kidney disease) stage 2, GFR 60-89 ml/min 8/15/2019    Current use of aspirin 8/14/2019    Dysarthria 8/12/2019    Dysphagia 8/12/2019    Elevated prostate specific antigen (PSA) 7/21/2011    First degree atrioventricular block by electrocardiogram 8/12/2019    Hypertensive heart and kidney disease without heart failure and with stage 2 chronic kidney disease     Iron deficiency anemia 8/14/2019    Anemia work-up (8/14/2019) showed serum iron 22, TIBC 371, iron % saturation 6, ferritin 34, reticulocyte count 1.4     Obesity, Class I, BMI 30-34.9     Obstructive sleep apnea on CPAP     On statin therapy due to risk of future cardiovascular event 8/14/2019    On Atorvastatin    Primary osteoarthritis of right ankle 8/22/2019    X-ray of the right ankle (8/22/2019) showed no acute fracture or subluxation; advanced degenerative changes at the tibiotalar joint; old fracture fragment vs. accessory ossicle in the inferior aspect of the lateral malleolus; soft tissue swelling around the ankle.     Pure hypercholesterolemia 08/15/2019    Lipid profile (8/13/2019) showed , , HDL 42, ; Lipid profile (8/14/2019) showed , TC 173, HDL 39, LDL 94    Received intravenous tissue plasminogen activator (tPA) in emergency department 8/12/2019    Refusal of statin medication by patient 8/13/2019    As per note of Neurology (Dr. Endy Forbes), patient refuses statin agent because of potential side effects.  Right hemiparesis ([facility] [address] 8/12/2019    Secondary hyperparathyroidism of renal origin ([facility] [address] 8/18/2019    PTH (8/18/2019) = 101.7    Type 2 diabetes mellitus with stage 2 chronic kidney disease (HCC)     HbA1c (8/13/2019) = 6.6    Vitamin B12 deficiency anemia 8/14/2019    Vitamin B12 (8/14/2019) = 208    Vitamin D deficiency 8/19/2019    Vitamin D 25-Hydroxy (8/19/2019) = 16.2       Patient taking anticoagulants yes Heparin, ASA   Patient has a defibrillator: no     Assessment:   Changes in Assessment throughout shift: No     Patient has central line: no Reasons if yes: Insertion date: Last dressing date:   Patient has Boykin Cath: no Reasons if yes:     Insertion date:     Last Vitals:     Vitals:    09/01/19 0750 09/01/19 1612 09/01/19 2107 09/02/19 0605   BP: 112/57 133/59 140/65 183/75   Pulse: 60 (!) 55 73 63   Resp: 18 18 18    Temp: 97.7 °F (36.5 °C) 97 °F (36.1 °C) 97.5 °F (36.4 °C)    SpO2: 94% 97% 96%    Weight:       Height:            PAIN    Pain Assessment    Pain Intensity 1: 0 (09/02/19 0605) Pain Intensity 1: 2 (12/29/14 1105)    Pain Location 1: Shoulder Pain Location 1: Abdomen    Pain Intervention(s) 1: Medication (see MAR) Pain Intervention(s) 1: Medication (see MAR)  Patient Stated Pain Goal: 0 Patient Stated Pain Goal: 0  o Intervention effective: no    o Other actions taken for pain:      Skin Assessment  Skin color Skin Color: Appropriate for ethnicity  Condition/Temperature Skin Condition/Temp: Warm  Integrity Skin Integrity: Intact  Turgor Turgor: Non-tenting  Weekly Pressure Ulcer Documentation  Pressure  Injury Documentation: No Pressure Injury Noted-Pressure Ulcer Prevention Initiated  Wound Prevention & Protection Methods  Orientation of wound Orientation of Wound Prevention: Posterior  Location of Prevention Location of Wound Prevention: Sacrum/Coccyx  Dressing Present Dressing Present : No  Dressing Status Dressing Status: Intact  Wound Offloading Wound Offloading (Prevention Methods): Bed, pressure redistribution/air     INTAKE/OUPUT  Date 09/01/19 0700 - 09/02/19 0659 09/02/19 0700 - 09/03/19 0659   Shift 7046-9450 8008-0497 24 Hour Total 0700-1859 1900-0659 24 Hour Total   INTAKE   Shift Total(mL/kg)         OUTPUT   Urine(mL/kg/hr)  1363(2.1) 2700(1.1)        Urine Voided  2700 2700        Urine Occurrence(s) 3 x 7 x 10 x      Emesis/NG output           Emesis Occurrence(s) 0 x  0 x      Stool           Stool Occurrence(s) 1 x 0 x 1 x      Shift Total(mL/kg)  2700(26.5) 2700(26.5)      NET  -2700 -2700      Weight (kg) 102 102 102 102 102 102       Recommendations:  1. Patient needs and requests:     2. Diet: Cardiac Mech Soft Nectar Thick     3. Pending tests/procedures:      4. Functional Level/Equipment:     5. Estimated Discharge Date: 8/24/19 Posted on Whiteboard in Patients Room: yes     HEALS Safety Check    A safety check occurred in the patient's room between off going nurse and oncoming nurse listed above.     The safety check included the below items  Area Items   H  High Alert Medications - Verify all high alert medication drips (heparin, PCA, etc.)   E  Equipment - Suction is set up for ALL patients (with yanker)  - Red plugs utilized for all equipment (IV pumps, etc.)  - WOWs wiped down at end of shift.  - Room stocked with oxygen, suction, and other unit-specific supplies   A  Alarms - Bed alarm is set for fall risk patients  - Ensure chair alarm is in place and activated if patient is up in a chair   L  Lines - Check IV for any infiltration  - Boykin bag is empty if patient has a Boykin   - Tubing and IV bags are labeled   S  Safety   - Room is clean, patient is clean, and equipment is clean. - Hallways are clear from equipment besides carts. - Fall bracelet on for fall risk patients  - Ensure room is clear and free of clutter  - Suction is set up for ALL patients (with diann)  - Hallways are clear from equipment besides carts.    - Isolation precautions followed, supplies available outside room, sign posted

## 2019-09-02 NOTE — PROGRESS NOTES
Endocrinology Consultation Progress Note    Patient: Marisol Painting Age: 71 y.o. : 1955 MR#: [id number] SSN: [id number]  Date: 2019     Subjective:    Patient seen at chair side today. Pt is feeling fine. No acute overnight events. Assessment/Plan: This is a 79-year-old white man who was admitted for a acute ischemic stroke at the left posterior basal ganglia to the corona radiata and now is in acute inpatient rehab. He was found to be hypokalemic and hypertensive and endocrinology was consulted for concerns for hyperaldosteronism as a secondary cause of hypertension. Work-up has shown no hyperaldosteronism and hypokalemia most likely related to CKD secondary to his diabetes. He has responded well to spironolactone 50 mg p.o. twice daily and potassium is now currently at goal (4.6 on 2019). He does have an elevated PTH of 101.2 and this is thought to be a secondary hyperparathyroidism secondary to his chronic kidney disease. He was also found to be vitamin D deficient with a vitamin D 25 OH of 16.2 (this may have some contribution to his elevated PTH as well). He was started on cholecalciferol 5000 units p.o. daily. He was also shown to have a subclinical hypothyroidism, but since TSH is < 10, there is no medical indication to treat.    -Would continue spironolactone 50 mg p.o. twice daily as blood pressure tolerates.    -Would continue to check potassium to see if further adjustments need to be made with his spironolactone.    -Would continue-vitamin D supplementation and recheck vitamin D 25 OH in approximately 12 weeks from start of treatment. If levels are greater than 30, then cholecalciferol can be reduced to 1000 units p.o. daily.    -Would continue current regimen for diabetes since he is well controlled with an A1c of 6.6%. Take you for the consult, Will continue to follow patient with you while admitted to rehab.       Case discussed with:  [x]Patient  [x]Family []Nursing  []Attending  Time Spent: 20 minutes      Objective:   VS:   Visit Vitals  /74   Pulse 72   Temp 97.5 °F (36.4 °C)   Resp 20   Ht 5' 9\" (1.753 m)   Wt 102 kg (224 lb 14.4 oz)   SpO2 96%   BMI 33.21 kg/m²      Tmax/24hrs: Temp (24hrs), Av.3 °F (36.3 °C), Min:97 °F (36.1 °C), Max:97.5 °F (36.4 °C)      Intake/Output Summary (Last 24 hours) at 2019 1408  Last data filed at 2019 5458  Gross per 24 hour   Intake --   Output 2700 ml   Net -2700 ml     PE  General: Alert oriented x3, NAD  HEENT: Normocephalic, EOMI  Neuro: Right-sided facial droop present  Speech: Clear and understandable  Psych: Good mood normal affect    ROS: As per subjective, otherwise negative    Labs:    Recent Results (from the past 24 hour(s))   GLUCOSE, POC    Collection Time: 19  4:10 PM   Result Value Ref Range    Glucose (POC) 118 (H) 70 - 110 mg/dL   GLUCOSE, POC    Collection Time: 19  9:12 PM   Result Value Ref Range    Glucose (POC) 134 (H) 70 - 364 mg/dL   METABOLIC PANEL, BASIC    Collection Time: 19  6:11 AM   Result Value Ref Range    Sodium 140 136 - 145 mmol/L    Potassium 4.6 3.5 - 5.5 mmol/L    Chloride 108 100 - 111 mmol/L    CO2 25 21 - 32 mmol/L    Anion gap 7 3.0 - 18 mmol/L    Glucose 89 74 - 99 mg/dL    BUN 39 (H) 7.0 - 18 MG/DL    Creatinine 1.69 (H) 0.6 - 1.3 MG/DL    BUN/Creatinine ratio 23 (H) 12 - 20      GFR est AA 50 (L) >60 ml/min/1.73m2    GFR est non-AA 41 (L) >60 ml/min/1.73m2    Calcium 8.5 8.5 - 10.1 MG/DL   HGB & HCT    Collection Time: 19  6:11 AM   Result Value Ref Range    HGB 7.6 (L) 13.0 - 16.0 g/dL    HCT 24.0 (L) 36.0 - 48.0 %   PLATELET COUNT    Collection Time: 19  6:11 AM   Result Value Ref Range    PLATELET 889 692 - 212 K/uL   GLUCOSE, POC    Collection Time: 19  7:26 AM   Result Value Ref Range    Glucose (POC) 90 70 - 110 mg/dL   GLUCOSE, POC    Collection Time: 19 11:23 AM   Result Value Ref Range    Glucose (POC) 102 70 - 110 mg/dL         Signed By: Yin James MD     September 2, 2019 7:04 PM

## 2019-09-02 NOTE — PROGRESS NOTES
Progress Note  Subjective   Feels okay   Doing well in rehab           69y M with DM, HTN, CKD, admitted to rehab after stroke, seen for CKD  Impression & Plan:   IMPRESSION:   Chronic kidney disease stage III with heavy proteinuria, diabetic nephropathy  Proteinuria, diabetes related, not on any antiproteinuric medication   Hypertension, well controlled  Diabetes  Recent acute ischemic stroke  Chronic hypokalemia, on spiranolactone  Anemia  Secondary hyperparathyroidism   PLAN:   Stable renal function    I would suggest to finish IV iron 600mg more if he agree, otherwise will arrange outpatient   Will start ACE inhibitors outpatient. Available if any question or concern. Follow up in 4-6 weeks   Adjust medication for current renal function status.                 Current Facility-Administered Medications   Medication Dose Route Frequency    labetalol (NORMODYNE) tablet 600 mg  600 mg Oral Q12H    ferrous sulfate tablet 325 mg  1 Tab Oral DAILY WITH BREAKFAST    hydrALAZINE (APRESOLINE) tablet 75 mg  75 mg Oral Q8H    baclofen (LIORESAL) tablet 10 mg  10 mg Oral TID    spironolactone (ALDACTONE) tablet 50 mg  50 mg Oral BID    cyanocobalamin tablet 1,000 mcg  1,000 mcg Oral DAILY    cholecalciferol (VITAMIN D3) capsule 5,000 Units  5,000 Units Oral DAILY    isosorbide dinitrate (ISORDIL) tablet 30 mg  30 mg Oral Q8H    hydrALAZINE (APRESOLINE) tablet 25 mg  25 mg Oral TID PRN    acetaminophen (TYLENOL) tablet 650 mg  650 mg Oral Q4H PRN    bisacodyl (DULCOLAX) tablet 10 mg  10 mg Oral Q48H PRN    heparin (porcine) injection 5,000 Units  5,000 Units SubCUTAneous Q8H    insulin lispro (HUMALOG) injection   SubCUTAneous TIDAC    aspirin chewable tablet 81 mg  81 mg Oral DAILY WITH BREAKFAST    amLODIPine (NORVASC) tablet 10 mg  10 mg Oral DAILY    atorvastatin (LIPITOR) tablet 80 mg  80 mg Oral QHS       Review of Systems:     As above   Data Review:    Labs: Results:       Chemistry Recent Labs 09/02/19  0611   GLU 89      K 4.6      CO2 25   BUN 39*   CREA 1.69*   CA 8.5   AGAP 7   BUCR 23*      CBC w/Diff Recent Labs     09/02/19  0611   HGB 7.6*   HCT 24.0*         Coagulation No results for input(s): PTP, INR, APTT in the last 72 hours. No lab exists for component: INREXT, INREXT    Iron/Ferritin No results for input(s): IRON in the last 72 hours. No lab exists for component: TIBCCALC   BNP No results for input(s): BNPP in the last 72 hours. Cardiac Enzymes No results for input(s): CPK, CKND1, DANY in the last 72 hours. No lab exists for component: CKRMB, TROIP   Liver Enzymes No results for input(s): TP, ALB, TBIL, AP, SGOT, GPT in the last 72 hours.     No lab exists for component: DBIL   Thyroid Studies Lab Results   Component Value Date/Time    TSH 4.63 (H) 08/19/2019 06:55 AM         EKG: sinus     Physical Assessment:     Visit Vitals  /56   Pulse (!) 53   Temp 97.5 °F (36.4 °C)   Resp 20   Ht 5' 9\" (1.753 m)   Wt 102 kg (224 lb 14.4 oz)   SpO2 96%   BMI 33.21 kg/m²     Weight change:     Intake/Output Summary (Last 24 hours) at 9/2/2019 1024  Last data filed at 9/2/2019 9663  Gross per 24 hour   Intake --   Output 2700 ml   Net -2700 ml     Physical Exam:   General: comfortable, no acute distress   HEENT sclera anicteric, supple neck, no thyromegaly  CVS: S1S2 heard,  no rub  RS: + air entry b/l,   Abd: Soft, Non tender, Not distended,   Neuro: non focal, awake, alert , CN II-XII are grossly intact  Extrm: + edema, no cyanosis, clubbing   Skin: no visible  Rash  Musculoskeletal: No gross joints or bone deformities     Procedures/imaging: see electronic medical records for all procedures, Xrays and details which were not copied into this note but were reviewed prior to creation of Ellis Skinner MD  September 2, 2019  [facility]  Office [phone]

## 2019-09-02 NOTE — PROGRESS NOTES
Problem: Diabetes Self-Management  Goal: *Disease process and treatment process  Description  Define diabetes and identify own type of diabetes; list 3 options for treating diabetes. Outcome: Progressing Towards Goal  Goal: *Incorporating nutritional management into lifestyle  Description  Describe effect of type, amount and timing of food on blood glucose; list 3 methods for planning meals. Outcome: Progressing Towards Goal  Goal: *Incorporating physical activity into lifestyle  Description  State effect of exercise on blood glucose levels. Outcome: Progressing Towards Goal  Goal: *Developing strategies to promote health/change behavior  Description  Define the ABC's of diabetes; identify appropriate screenings, schedule and personal plan for screenings. Outcome: Progressing Towards Goal  Goal: *Using medications safely  Description  State effect of diabetes medications on diabetes; name diabetes medication taking, action and side effects. Outcome: Progressing Towards Goal  Goal: *Monitoring blood glucose, interpreting and using results  Description  Identify recommended blood glucose targets  and personal targets. Outcome: Progressing Towards Goal  Goal: *Prevention, detection, treatment of acute complications  Description  List symptoms of hyper- and hypoglycemia; describe how to treat low blood sugar and actions for lowering  high blood glucose level. Outcome: Progressing Towards Goal  Goal: *Prevention, detection and treatment of chronic complications  Description  Define the natural course of diabetes and describe the relationship of blood glucose levels to long term complications of diabetes.   Outcome: Progressing Towards Goal  Goal: *Developing strategies to address psychosocial issues  Description  Describe feelings about living with diabetes; identify support needed and support network  Outcome: Progressing Towards Goal  Goal: *Insulin pump training  Outcome: Progressing Towards Goal  Goal: *Sick day guidelines  Outcome: Progressing Towards Goal  Goal: *Patient Specific Goal (EDIT GOAL, INSERT TEXT)  Outcome: Progressing Towards Goal     Problem: Falls - Risk of  Goal: *Absence of Falls  Description  Document Valentín Dash Fall Risk and appropriate interventions in the flowsheet. Outcome: Progressing Towards Goal  Note:   Fall Risk Interventions:  Mobility Interventions: Patient to call before getting OOB    Mentation Interventions: Bed/chair exit alarm    Medication Interventions: Patient to call before getting OOB    Elimination Interventions: Patient to call for help with toileting needs    History of Falls Interventions: Bed/chair exit alarm         Problem: Pressure Injury - Risk of  Goal: *Prevention of pressure injury  Description  Document Bartolome Scale and appropriate interventions in the flowsheet. Outcome: Progressing Towards Goal  Note:   Pressure Injury Interventions:  Sensory Interventions: Assess changes in LOC    Moisture Interventions: Absorbent underpads    Activity Interventions: Chair cushion, Increase time out of bed, Pressure redistribution bed/mattress(bed type)    Mobility Interventions: HOB 30 degrees or less, Pressure redistribution bed/mattress (bed type)    Nutrition Interventions: Document food/fluid/supplement intake    Friction and Shear Interventions: Foam dressings/transparent film/skin sealants, HOB 30 degrees or less                Problem: Patient Education: Go to Patient Education Activity  Goal: Patient/Family Education  Outcome: Progressing Towards Goal     Problem: Dysphagia (Adult)  Goal: *Speech Goal: (INSERT TEXT)  Description  Long term goals  Patient will:  1. Tolerate regular diet with thin liquids using safe swallowing techniques without s/s of aspiration in 5/6 meals. 2. Use safe swallowing techniques (including slowed rate, small bites/sips, alternate liquids/solids, effortful swallow, head rotation to right for liquids) with supervision.   3. Participate in MBS study prior to advancing to thin liquids to assure safety (no aspiration). 4. Perform oral and pharyngeal strengthening exercises (to improve speech and swallowing) with supervision-modified independence. 5. Demonstrate 90% intelligibility in conversation using appropriate speaking strategies (slowed rate, overarticulation, increased volume). 6. Recall 3 words after 5 minutes with supervision. 7. Perform functional problem solving/reasoning tasks with 90% accuracy. 8. Name 8-10 items in categories of increasing abstraction, supervision. Short term goals (by 8/30/19)  Patient will:   1. Tolerate soft diet with nectar thick liquids using safe swallowing techniques without s/s of aspiration in 5/6 meals. 2. Use safe swallowing techniques (including slowed rate, small bites/sips, alternate liquids/solids, effortful swallow, head rotation to right for liquids) with min cues. 3. Tolerate small amounts of ice chips and water with the SLP using head rotation to the right and/or chin tuck, without overt s/s of aspiration. 4. Perform oral and pharyngeal strengthening exercises (to improve speech and swallowing) with min assist.  5. Demonstrate 90% intelligibility in polysyllabic words and sentences using appropriate speaking strategies (slowed rate, overarticulation, increased volume). 6. Recall 3 words after 5 minutes with mod assist.  7. Perform functional problem solving/reasoning tasks with 75-85% accuracy. 8. Name 8-10 items in concrete categories, supervision.       Outcome: Progressing Towards Goal     Problem: Inpatient Rehab (Adult)  Goal: *LTG: Avoids injury/falls 100% of time related to deficits  Outcome: Progressing Towards Goal  Goal: *LTG: Avoids infection 100% of time related to deficits  Outcome: Progressing Towards Goal  Goal: *LTG: Verbalize understanding of diagnosis and risk factors for recurring stroke  Outcome: Progressing Towards Goal  Goal: *LTG: Absence of DVT during hospitalization  Outcome: Progressing Towards Goal  Goal: *LTG: Maintains Skin Integrity With No Evidence of Pressure Injury 100% of Time  Outcome: Progressing Towards Goal  Goal: Interventions  Outcome: Progressing Towards Goal

## 2019-09-02 NOTE — PROGRESS NOTES
Progress Note    Patient: Zee Barragan MRN: [id number]  CSN: [id number]    YOB: 1955  Age: 71 y.o. Sex: male    DOA: 8/15/2019 LOS:  LOS: 18 days                    Subjective:     No acute patient complaints today. Primary Rehab Diagnosis: Impaired Mobility and ADLs secondary to Acute Ischemic Stroke (acute/early subacute infarct at the left posterior basal ganglia to corona radiata) with residual right hemiparesis, dysphagia and dysarthria    Review of systems  General: No fevers or chills. Cardiovascular: No chest pain or pressure. No palpitations. Pulmonary: No shortness of breath, cough or wheeze. Gastrointestinal: No abdominal pain, nausea, vomiting or diarrhea. Genitourinary: No urinary frequency, urgency, hesitancy or dysuria. Musculoskeletal: No joint or muscle pain, no back pain, no recent trauma. Neurologic: +right sided weakness and difficulty with speech and swallow. Objective:     Physical Exam:  Visit Vitals  /56   Pulse (!) 53   Temp 97.5 °F (36.4 °C)   Resp 20   Ht 5' 9\" (1.753 m)   Wt 102 kg (224 lb 14.4 oz)   SpO2 96%   BMI 33.21 kg/m²        General:         Alert, cooperative, no acute distress    HEENT: NC, Atraumatic. Lungs: CTA Bilaterally. No Wheezing/Rhonchi/Rales. Heart:  Regular  rhythm,  No murmur, No Rubs, No Gallops  Abdomen: Soft, Non distended, Non tender.  +Bowel sounds, no HSM  Extremities: R ankle edema 1+ no ttp, no calf ttp  Psych:   Not anxious or agitated. Neurologic:  Alert and oriented X 3. Right sided facial droop, remaining CNII-XII intact. Strenght LUE and LLE is 5/5, 1/5 R upper arm/shoulder, 0/5 with RUE hand, 2/5 RLE thigh, 0/5 RLE foot. Intake and Output:  Current Shift:  No intake/output data recorded.   Last three shifts:  08/31 1901 - 09/02 0700  In: -   Out: 4100 [Urine:4100]    Labs: Results:       Chemistry Recent Labs     09/02/19  0611   GLU 89      K 4.6      CO2 25   BUN 39*   CREA 1.69*   CA 8. 5   AGAP 7   BUCR 23*      CBC w/Diff Recent Labs     09/02/19  0611   HGB 7.6*   HCT 24.0*         Cardiac Enzymes No results for input(s): CPK, CKND1, DANY in the last 72 hours. No lab exists for component: CKRMB, TROIP   Coagulation No results for input(s): PTP, INR, APTT in the last 72 hours. No lab exists for component: INREXT, INREXT    Lipid Panel Lab Results   Component Value Date/Time    Cholesterol, total 173 08/14/2019 03:42 AM    HDL Cholesterol 39 (L) 08/14/2019 03:42 AM    LDL, calculated 94 08/14/2019 03:42 AM    VLDL, calculated 40 08/14/2019 03:42 AM    Triglyceride 200 (H) 08/14/2019 03:42 AM    CHOL/HDL Ratio 4.4 08/14/2019 03:42 AM      BNP No results for input(s): BNPP in the last 72 hours. Liver Enzymes No results for input(s): TP, ALB, TBIL, AP, SGOT, GPT in the last 72 hours.     No lab exists for component: DBIL   Thyroid Studies Lab Results   Component Value Date/Time    TSH 4.63 (H) 08/19/2019 06:55 AM          Procedures/imaging: see electronic medical records for all procedures/Xrays and details which were not copied into this note but were reviewed prior to creation of Plan    Medications:   Current Facility-Administered Medications   Medication Dose Route Frequency    labetalol (NORMODYNE) tablet 600 mg  600 mg Oral Q12H    ferrous sulfate tablet 325 mg  1 Tab Oral DAILY WITH BREAKFAST    hydrALAZINE (APRESOLINE) tablet 75 mg  75 mg Oral Q8H    baclofen (LIORESAL) tablet 10 mg  10 mg Oral TID    spironolactone (ALDACTONE) tablet 50 mg  50 mg Oral BID    cyanocobalamin tablet 1,000 mcg  1,000 mcg Oral DAILY    cholecalciferol (VITAMIN D3) capsule 5,000 Units  5,000 Units Oral DAILY    isosorbide dinitrate (ISORDIL) tablet 30 mg  30 mg Oral Q8H    hydrALAZINE (APRESOLINE) tablet 25 mg  25 mg Oral TID PRN    acetaminophen (TYLENOL) tablet 650 mg  650 mg Oral Q4H PRN    bisacodyl (DULCOLAX) tablet 10 mg  10 mg Oral Q48H PRN    heparin (porcine) injection 5,000 Units  5,000 Units SubCUTAneous Q8H    insulin lispro (HUMALOG) injection   SubCUTAneous TIDAC    aspirin chewable tablet 81 mg  81 mg Oral DAILY WITH BREAKFAST    amLODIPine (NORVASC) tablet 10 mg  10 mg Oral DAILY    atorvastatin (LIPITOR) tablet 80 mg  80 mg Oral QHS       Assessment/Plan     Principal Problem:    Acute ischemic stroke (HCC) (8/12/2019)      Overview: Acute Ischemic Stroke (acute/early subacute infarct at the left posterior       basal ganglia to corona radiata) with residual right hemiparesis,       dysphagia and dysarthria    Active Problems:    Impaired mobility and ADLs (8/12/2019)      Received intravenous tissue plasminogen activator (tPA) in emergency department (8/12/2019)      Hypertensive heart and kidney disease without heart failure and with stage 2 chronic kidney disease ()      Right hemiparesis ([address] (8/12/2019)      Dysphagia (8/12/2019)      Dysarthria (8/12/2019)      Chronic hypokalemia ()      Overview: Persistent chronic hypokalemia + hypertension; ?primary hyperaldosteronism      Current use of aspirin (8/14/2019)      On statin therapy due to risk of future cardiovascular event (8/14/2019)      Overview: On Atorvastatin      Type 2 diabetes mellitus with stage 2 chronic kidney disease (HCC) ()      Overview: HbA1c (8/13/2019) = 6.6      Pure hypercholesterolemia (8/15/2019)      Overview: Lipid profile (8/13/2019) showed , , HDL 42, ; Lipid       profile (8/14/2019) showed , , HDL 39, LDL 94      Refusal of statin medication by patient (8/13/2019)      Overview: As per note of Neurology (Dr. Nathan Villasenor), patient refuses statin agent       because of potential side effects.        Leukocytosis (8/16/2019)      Overview: WBC count (8/16/2019) = 17.9      Vitamin B12 deficiency anemia (8/14/2019)      Overview: Vitamin B12 (8/14/2019) = 208      Iron deficiency anemia (8/14/2019)      Overview: Anemia work-up (8/14/2019) showed serum iron 22, TIBC 371, iron %       saturation 6, ferritin 34, reticulocyte count 1.4            Vitamin D deficiency (8/19/2019)      Overview: Vitamin D 25-Hydroxy (8/19/2019) = 16.2       Primary osteoarthritis of right ankle (8/22/2019)      Overview: X-ray of the right ankle (8/22/2019) showed no acute fracture or       subluxation; advanced degenerative changes at the tibiotalar joint; old       fracture fragment vs. accessory ossicle in the inferior aspect of the       lateral malleolus; soft tissue swelling around the ankle.       Secondary hyperparathyroidism of renal origin ([facility] [address] (8/18/2019)      Overview: PTH (8/18/2019) = 101.7      Acute Ischemic Stroke (acute/early subacute infarct at the left posterior basal ganglia to corona radiata) with residual right hemiparesis, dysphagia and dysarthria; S/P Administration of intravenous tPA (8/12/2019 - [address])  Continue aspirin 81mg daily, atorvastatin 80mg qhs, baclofen 10mg PO TID  Continue PT/OT/ST    Chronic hypokalemia   Continue Spironolactone 50 mg PO BID, K+ stable today 4.6  Monitor K+ qMonday and thursdays      Hypertensive heart and kidney disease without heart failure and with stage 2 chronic kidney disease  Plan to add ACE-I or ARB for proteinuria once renal function stable and serum creatinine and serum potassium are no longer rising upwards  At this time continues amlodipine 10mg, hydralazine 75mg q8, labetalol 600mg q12, isosorbide dinitrate 30mg q8, spirnolactone 50mg bid, hydralazine 25mg tid prn SBP > 170    Pure hypercholesterolemia  Continue Atorvastatin 80 mg PO q HS    Type 2 diabetes mellitus with stage 2 chronic kidney disease  Controlled  Continue Humalog insulin sliding scale SC TID AC     Leukocytosis, resolved   monitor     Iron deficiency anemia / Vitamin B12 anemia  Off IV iron (pt elected to discontinue prior to full course) was started back on oral iron  Nephrology recommending to complete IV iron course 600mg (200mg/day x 3 more days) if pt agreeable otherwise he will arrange as outpatient to complete this. Discussed with patient and wife today, he is agreeable to 3 more days of IV iron. Will order. Continue Cyanocobalamin 1000mcg daily, ferrous sulfate 325mg daily     Vitamin D Deficiency  Continue Cholecalciferol 5,000 units PO once daily     Right ankle swelling, most likely dependent edema due to hemiparesis/lack of muscle contraction and osteoarthritis of the right ankle  Had  X-ray of the right ankle (8/22/2019) showed no acute fracture or subluxation; advanced degenerative changes at the tibiotalar joint; old fracture fragment vs. accessory ossicle in the inferior aspect of the lateral malleolus; soft tissue swelling around the ankle. Elevate right leg/foot when supine in bed     Stage 2 chronic kidney disease --> Acute kidney injury superimposed on CKD stage 2 VS progression to Stage 3 chronic kidney disease  Stable renal function, Nephrology consulted (Dr. Josephine Gama)     Restart IV iron as above. Possible ace-I for proteinuria as renal function allows to be started as outpatient.   Follow up with Dr. Mickey Francisco in 4-6 weeks.    Campbell Santamaria MD  9/2/2019 12:31pm

## 2019-09-02 NOTE — INTERDISCIPLINARY ROUNDS
[facility] PHYSICAL REHABILITATION  [address] [place] [address], [address]    INPATIENT REHABILITATION  PRE-TEAM CONFERENCE SUMMARY     Date of Conference: 9/3/2019    Patient Information:        Name: Kitty Ricci Age / Sex: 71 y.o. / male   CSN: [id number] MRN: [id number]   [facility] Date: 8/15/2019 Length of Stay: 18 days     Primary Rehabilitation Diagnosis  1. Impaired Mobility and ADLs  2. Acute Ischemic Stroke (acute/early subacute infarct at the left posterior basal ganglia to corona radiata) with residual right hemiparesis, dysphagia and dysarthria     Comorbidities   Obesity, Class I, BMI 30-34.9 E66.9    Obstructive sleep apnea on CPAP G47.33, Z99.89    Hypertensive heart and kidney disease without heart failure and with stage 2 chronic kidney disease I13.10, N18.2    Chronic hypokalemia E87.6    CKD (chronic kidney disease) stage 2, GFR 60-89 ml/min N18.2    Current use of aspirin Z79.82    On statin therapy due to risk of future cardiovascular event Z79.899    Type 2 diabetes mellitus with stage 2 chronic kidney disease  E11.22, N18.2    Pure hypercholesterolemia E78.00    Elevated prostate specific antigen (PSA) R97.20    Refusal of statin medication by patient Z48.34    First degree atrioventricular block by electrocardiogram I44.0    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 20X0    Leukocytosis D72.829    Iron deficiency anemia D50.9    Vitamin B12 anemia D51.9    Vitamin D deficiency E55.9    Secondary hyperparathyroidism of renal origin N25.81          Therapy:     FIM SCORES Initial Assessment Weekly Progress Assessment 9/2/2019   Eating Functional Level: 5  Comments: Pt reporting requiring set-up of container mgmt for self feeding. Pt able to use dominant left hand to manage utensils.   Feeding/Eating Assistance: 5 (Supervision/setup)(Per last assessment)  Comments: Pt requiring (A) for opening containers, able to self feed with dominant left hand I'ly Swallowing     Grooming 3 Grooming Assistance : 5 (Supervision)  Comments: Pt washed face seated on tub bench with set-up of soap on wash cloth to wash face. Bathing 2 Bathing Assistance, Upper: 4 (Minimal assistance)  Upper Body : Compensatory technique training;Training to use affected extremity as a gross motor assistance  Position Performed: Seated in chair  Adaptive Equipment: Grab bar;Long handled sponge; Tub bench  Comments: Pt re educated on placing right UE on right knee and bending forward to wash axilla. Pt required positioning for success. Pt required thoroughness wash for back after using LH sponge. Pt  re educated on placing wash cloth on left knee and bending forward/back to wash left UE as compensatory technique. Pt required (A) for reaching left axilla. Bathing Assistance, Lower : 4 (Minimal assistance)  Adaptive Equipment: Grab bar;Long handled sponge  Position Performed: Seated in chair  Adaptive Equipment: Tub bench  Comments: Pt washing BLEs with wash cloth and using [place] to incorporate right UE into bathing. Pt utilizing LH sponge to reach ankles/feet. Pt required (A) to wash buttocks, performing forward flexion and having OT (A) to wash region. Upper Body Dressing Functional Level: 4  Items Applied/Steps Completed: Pullover (4 steps)  Comments: Pt educated on karthik technique to cecily shirt with Michael overall. Pt doffed shirt with SBA.   Dressing Assistance : 5 (Supervision)  Comments: Pt doffed shirt MI, donned shirt utilizing karthik technique with supervision   Lower Body Dressing Functional Level: 2  Items Applied/Steps Completed: Elastic waist pants (3 steps), Sock, left (1 step), Sock, right (1 step), Underpants (3 steps)  Comments: Pt doffed socks with modA, donned socks with TA. LB clothing score reflected from pt performing toileting task, requiring maxA for clothing mgmt  Dressing Assistance : 4 (Minimal assistance)(with increased time)  Leg Crossed Method Used: No  Position Performed: Seated in chair;Standing  Adaptive Equipment Used: Reacher;Sock aid  Comments: Pt doffed LB clothing and socks with use of reacher with SBA with initial cue for reacher placement for pants. Pt doffed socks with S. Pt donned socks with sock aide with increased time and SBA with 2x tactile cues and donned pants w/o AD with Michael in stand and increased time. Toileting Functional Level: 2  Comments: Pt able to initiate to wipe but requiring MaxA for thoroughness. Pt required maxA overall for clothing mgmt with pt initiating to assist. Pt encouraged to focus on steadying balance in standing with additional person present assisting for clothing and buttocks cleansing. Toileting Assistance (FIM Score): 4 (Minimal assistance)(Michael-CGA)  Cues: Tactile cues provided  Adaptive Equipment: Elevated seat;Grab bars   Bladder - level of assist 5     Bladder - accident frequency score 6     Bowel - level of assist 2     Bowel - accident frequency score 6     Toilet Transfer [place] Toilet Transfer Score: 1  Comments: modA x 2. Second staff member present for safety and (A) with initial sit<>stand.  4 (Minimal assistance)   Tub/Shower Transfer [place] Tub or Shower Type: Tub/Shower combination  Tub/Shower Transfer Score: 0  Comments: NT 2/2 to safety and decreased functional transfer (I).  Bathing performed at sink this AM Shower  3 (Moderate assistance)(Michael>TTB modA >w/c after shower)   Comprehension Primary Mode of Comprehension: Auditory  Score: 4     5   Expression Primary Mode of Expression: Verbal  Score: 4  Comments: Pt requiring increased time and repetitions to make needs known 2/2 to facial droop impeding expression      4   Social Interaction Score: 4  Comments: Pt interacting appropriately with OT   5   Problem Solving Score: 4  Comments: Pt requiring Michael for problem solving body mechanics prior to toileting transfer   4   Memory Score: 4  4     FIM SCORES Initial Assessment Weekly Progress Assessment 9/2/2019   Bed/Chair/Wheelchair Transfers Transfer Type: (stand step without assistive device)  Other: stand step txfr without AD  Transfer Assistance : 2 (Maximal assistance)  Sit to Stand Assistance: Maximum assistance  Car Transfers: Not tested Sit to Stand Assistance:  Moderate assistance   Bed Mobility Rolling Right 6 (Modified independent)   Rolling Left 3 (Moderate assistance )   Supine to Sit 3 (Moderate assistance)   Sit to Stand Maximum assistance   Sit to Supine 3 (Moderate assistance)    Rolling Right    6 mod I   Rolling Left    4 min A   Supine to Sit    min A/CGA   Sit to Stand   Moderate assistance   Sit to Supine    min A/CGA      Locomotion (W/C) Able to Propel (ft): 300 feet  Functional Level: 5  Curbs/Ramps Assist Required (FIM Score): 0 (Not tested)  Wheelchair Setup Assist Required : 3 (Moderate assistance)(for right brake and leg rest)  Wheelchair Management: Manages left brake Function    Setup Assistance    propels >200 feet mod I on even surfaces after right leg rest is set up, pt is able to manage brakes independently     Locomotion (W/C distance)    200 feet   Locomotion (Walk) 2 (Maximal assistance) 4 (Minimal assistance)  Cane, quad   Locomotion (Walk dist.) 3 Feet (ft) 12 Feet (ft)(4 times with seated rest break between trials)   Steps/Stairs Steps/Stairs Ambulated (#): 0  Level of Assist : 0 (Not tested)  NT         Nursing:     Neuro:   AAA&O x  4          Respiratory:   [x] WNL   [] O2 LPM:   Other:  Peripheral Vascular:   [] TEDS present   [x] Edema present ____ Grade   Cardiac:   [x] WNL   [] Other  Genitourinary:   [x] continent   [] incontinent   [] perkins  Abdominal _9/1/19______ LBM  GI: _Cardiac soft______ Diet __nectar____ Liquids _____ tube feeds  Musculoskeletal: ____ ROM Transfers __wheelchair___ Assistive Device Used  _1___ Level of Assistance  Skin Integumentary:   [x] Intact   [] Not Intact   __________Preventative Measures  Details______________________________________________________________  Pain: [x] Controlled   [] Not Controlled   Pain Meds:   [] Scheduled   [x] PRN        Registered Dietitian / Nutrition:     Patient Vitals for the past 100 hrs:   % Diet Eaten   09/02/19 1453 100 %   09/02/19 0952 100 %   09/01/19 0957 100 %   08/31/19 1300 75 %   08/31/19 0900 80 %   08/30/19 1548 100 %     Pt with therapy at time of visit. Has good meal intake per chart documentation. Supplements:          [] Yes   [x] No      Amount of supplement consumed:  Not applicable      Intake/Output Summary (Last 24 hours) at 9/2/2019 1516  Last data filed at 9/2/2019 1453  Gross per 24 hour   Intake 480 ml   Output 2700 ml   Net -2220 ml                                Last bowel movement: 9/1      Interdisciplinary Team Goals:     1. Discipline  Physical Therapy    Goal  Patient will perform sit to stand transfers consistently at [facility] level    Barrier  decreased strength, balance, and activity tolerance    Intervention  therex, theract, transfer training, patient education    Goal written by:   Alem Murphy, PT     2. Discipline  Occupational Therapy    Goal  Pt will verbalize ways to incorporate right UE into ADL bathing/dressing/grooming w/o OT cues to increase cognitive recall of karthik/compensatory strategies. Barrier  Poor recall, learned non use    Intervention  Re education, state back method, NMRE, caregiver training    Goal written by:  Amirah Loaiza, OTR/L     3. Discipline  Speech Therapy    Goal  Patient will consistently slow rate of PO intake and take small boluses with min cues from the SLP/    Barrier  Moderate dyspahgia    Intervention  strengthening exercises, compensatory techniques, monitoring at meal time and advancement of diet as tolerated, MBS, training in safe swallowing techniques,     Goal written by:  Julissa Nava [facility]-SLP     4.  Discipline  Nursing    Goal  Maintain/increase strength and function of affected or compensatory body part    Barrier  weakness; paresthesia    Intervention  Assist patient with exercise and perform ROM exercises for both the affected and unaffected sides. Teach and encourage patient to use his unaffected side to exercise his affected side    Goal written by:  Peña Colon RN     5. Discipline  Clinical Psychology    Goal  Continue stroke education; address decreased self esteem    Barrier  Limited insight about all recovery and situational stress with illness    Intervention  Patient/stroke education and support     Goal written by:  Ellie Seaman, PhD     6. Discipline  Nutrition / Dietetics    Goal  Po intake of meals will meet >75% of patient estimated nutritional needs within the next 7 days. Outcome:  [x] Met/Ongoing    [] Progressing towards goal    [] Not progressing towards goal    [] New/Initial goal     Barrier  none known     Intervention  continue po diet    Goal written by:  Sebastian Starks RD       Disposition / Discharge Planning:      Follow-up services:  [x] Physical Therapy             [x] Occupational Therapy       [x] Speech Therapy           [] Skilled Nursing      [] Medical Social Worker   [] Aide        [] Outpatient     [] Home Health   [] [address]   [x] [facility] recommendations:     Estimated discharge date:     Discharge Location:  [] Home   [] [facility]   [] [facility]             [] Other:          Electronic Signatures:      Signature Date Signed   Physical Therapist    Valentine Rendon  9/2/19   Occupational Therapist   Gris Johnson, OTR/L 9/2/19   Speech Therapist    Jes Canada, [address] [place] [address]  9/2/19   Recreational Therapist    TANYA Sams 9/2/19   Nursing    Peña Colon RN  9/2/19   Dietitian    Sebastian Starks RD  9/2/19   Clinical Psychologist    Ellie Seaman, PhD 9/3/19    Physician    Marlene Trotter MD   9/3/2019                   The above information has been reviewed with the patient in a language that they can understand. Opportunity for comments and questions has been provided and a signed attestation has been scanned into the \"media tab\" of the EMR.       Patient Signature: ______________________________________________________    Date Signed: __________________________________________________________

## 2019-09-02 NOTE — PROGRESS NOTES
Problem: Mobility Impaired (Adult and Pediatric)  Goal: *Acute Goals and Plan of Care (Insert Text)  Description  Physical Therapy Short Term Goals  Initiated 8/16/2019 and to be accomplished within 14 day(s)  1. Patient will move from supine to sit and sit to supine  in bed with minimal assistance/contact guard assist. -met 8/30/19  2. Patient will transfer from bed to chair and chair to bed with minimal/moderate assistance using the least restrictive device. - met 8/30/19  3. Patient will perform sit to stand with minimal assistance/moderate assistance. - met 8/30/19  4. Patient will ambulate with moderate assistance  for 10 feet with the least restrictive device. -met 8/30/19    Physical Therapy Short Term Goals  Initiated 8/30/2019 and to be accomplished within 7 day(s)  1. Patient will move from supine to sit and sit to supine  in bed with supervision/set-up. 2.  Patient will transfer from bed to chair and chair to bed with minimal assistance/contact guard assist using the least restrictive device. 3.  Patient will perform sit to stand with minimal assistance/contact guard assist from w/c height. 4.  Patient will ambulate with minimal assistance/contact guard assist for 60 feet with the least restrictive device. Physical Therapy Goals  Initiated 8/16/2019 and to be accomplished within 4 weeks  1. Patient will move from supine to sit and sit to supine  in bed with supervision/set-up. 2.  Patient will transfer from bed to chair and chair to bed with supervision/set-up using the least restrictive device. 3.  Patient will perform sit to stand with supervision/set-up. 4.  Patient will ambulate with minimal assistance for 50 feet with the least restrictive device. 5.  Patient will ascend/descend 2 stairs with 1 handrail(s) with moderate assistance .         Outcome: Progressing Towards Goal   PHYSICAL THERAPY TREATMENT    Patient: Maria Guadalupe Murphy (45 y.o. male)  Date: 9/2/2019  Diagnosis: Acute ischemic stroke (HCC) [I63.9] Acute ischemic stroke [facility])  Precautions: Aspiration, NWB  Chart, physical therapy assessment, plan of care and goals were reviewed. Time In: 805  Time Out:   Patient Seen For: Balance activities;Gait training;Patient education; Therapeutic exercise;Transfer training;AM  Pain:  Pt pain was reported as  0 pre-treatment. Pt pain was reported as 0 post-treatment. Intervention: n/a    Patient identified with name and :yes    SUBJECTIVE:     \"I feel stiff today. \"    OBJECTIVE DATA SUMMARY:   Objective:     GROSS ASSESSMENT Daily Assessment            BED/MAT MOBILITY Daily Assessment            TRANSFERS Daily Assessment    Sit to Stand Assistance: Moderate assistance    Pt required increased facilitation at the posterior thigh and anterior knee to extend right LE during sit to stand transfer. Once standing patient was bearing increased weight through the left LE and required min A for proper weight shift to the right. GAIT Daily Assessment    Amount of Assistance: 4 (Minimal assistance)  Distance (ft): 12 Feet (ft)(4 times with seated rest break between trials)  Assistive Device: Cane, quad    Pt required near constant cues to contract right LE, but not hyperextend knee, prior to weight bearing.   Pt was able to slow movements down and required decreased cues for waiting to progress his left LE.         STEPS or STAIRS Daily Assessment     NT       BALANCE Daily Assessment    Sitting - Static: Good (unsupported)  Sitting - Dynamic: Fair (occasional)  Standing - Static: (fair-)  Standing - Dynamic : Impaired       WHEELCHAIR MOBILITY Daily Assessment     200 feet mod I once set up        THERAPEUTIC EXERCISES Daily Assessment    Extremity: Right  Exercise Type #1: Seated lower extremity strengthening  Sets Performed: 1  Reps Performed: 10  Level of Assist: Minimal assistance       Neuro Re-Education:  Sit to stand x5 with facilitation for right hip and knee extension; weight shifting laterally to increase weight bearing on the right while preventing knee buckling and hyperextension    ASSESSMENT:  Pt demonstrated increase difficulty with stabilizing right LE without hyperextending. Increase verbal cues required during ambulation for proper movements. Pt appeared distracted during treatment session and several times mentioned being worried about not being home to take care of his wife with a possible storm coming at the end of this week. Progression toward goals:  ?      Improving appropriately and progressing toward goals  ? Improving slowly and progressing toward goals  ? Not making progress toward goals and plan of care will be adjusted     PLAN:  Patient continues to benefit from skilled intervention to address the above impairments. Continue treatment per established plan of care. Discharge Recommendations:  [facility]  Further Equipment Recommendations for Discharge:  N/A     Estimated LOS: d/c 9/6/19    Activity Tolerance:   Fair-  Please refer to the flowsheet for vital signs taken during this treatment.     After treatment:   Patient left seated in the dining room for breakfast      Gladis Perez, PT  9/2/2019

## 2019-09-03 LAB
GLUCOSE BLD STRIP.AUTO-MCNC: 108 MG/DL (ref 70–110)
GLUCOSE BLD STRIP.AUTO-MCNC: 118 MG/DL (ref 70–110)
GLUCOSE BLD STRIP.AUTO-MCNC: 120 MG/DL (ref 70–110)
GLUCOSE BLD STRIP.AUTO-MCNC: 127 MG/DL (ref 70–110)

## 2019-09-03 PROCEDURE — 74011000258 HC RX REV CODE- 258: Performed by: FAMILY MEDICINE

## 2019-09-03 PROCEDURE — 92526 ORAL FUNCTION THERAPY: CPT

## 2019-09-03 PROCEDURE — 74011250636 HC RX REV CODE- 250/636: Performed by: FAMILY MEDICINE

## 2019-09-03 PROCEDURE — 65310000000 HC RM PRIVATE REHAB

## 2019-09-03 PROCEDURE — 97112 NEUROMUSCULAR REEDUCATION: CPT

## 2019-09-03 PROCEDURE — 82962 GLUCOSE BLOOD TEST: CPT

## 2019-09-03 PROCEDURE — 97530 THERAPEUTIC ACTIVITIES: CPT

## 2019-09-03 PROCEDURE — 97116 GAIT TRAINING THERAPY: CPT

## 2019-09-03 PROCEDURE — 74011250637 HC RX REV CODE- 250/637: Performed by: INTERNAL MEDICINE

## 2019-09-03 PROCEDURE — 74011250636 HC RX REV CODE- 250/636: Performed by: INTERNAL MEDICINE

## 2019-09-03 PROCEDURE — 97110 THERAPEUTIC EXERCISES: CPT

## 2019-09-03 PROCEDURE — 92507 TX SP LANG VOICE COMM INDIV: CPT

## 2019-09-03 RX ADMIN — BACLOFEN 10 MG: 10 TABLET ORAL at 18:26

## 2019-09-03 RX ADMIN — SPIRONOLACTONE 50 MG: 25 TABLET ORAL at 09:17

## 2019-09-03 RX ADMIN — HEPARIN SODIUM 5000 UNITS: 5000 INJECTION INTRAVENOUS; SUBCUTANEOUS at 14:36

## 2019-09-03 RX ADMIN — HYDRALAZINE HYDROCHLORIDE 75 MG: 50 TABLET, FILM COATED ORAL at 21:24

## 2019-09-03 RX ADMIN — ISOSORBIDE DINITRATE 30 MG: 20 TABLET ORAL at 05:58

## 2019-09-03 RX ADMIN — LABETALOL HYDROCHLORIDE 600 MG: 200 TABLET, FILM COATED ORAL at 18:26

## 2019-09-03 RX ADMIN — LABETALOL HYDROCHLORIDE 600 MG: 200 TABLET, FILM COATED ORAL at 05:57

## 2019-09-03 RX ADMIN — HYDRALAZINE HYDROCHLORIDE 75 MG: 50 TABLET, FILM COATED ORAL at 14:35

## 2019-09-03 RX ADMIN — ISOSORBIDE DINITRATE 30 MG: 20 TABLET ORAL at 21:25

## 2019-09-03 RX ADMIN — ISOSORBIDE DINITRATE 30 MG: 20 TABLET ORAL at 14:35

## 2019-09-03 RX ADMIN — BACLOFEN 10 MG: 10 TABLET ORAL at 12:57

## 2019-09-03 RX ADMIN — AMLODIPINE BESYLATE 10 MG: 10 TABLET ORAL at 09:17

## 2019-09-03 RX ADMIN — IRON SUCROSE 200 MG: 20 INJECTION, SOLUTION INTRAVENOUS at 18:26

## 2019-09-03 RX ADMIN — ATORVASTATIN CALCIUM 80 MG: 40 TABLET, FILM COATED ORAL at 21:24

## 2019-09-03 RX ADMIN — Medication 5000 UNITS: at 09:17

## 2019-09-03 RX ADMIN — BACLOFEN 10 MG: 10 TABLET ORAL at 05:58

## 2019-09-03 RX ADMIN — CYANOCOBALAMIN TAB 1000 MCG 1000 MCG: 1000 TAB at 09:17

## 2019-09-03 RX ADMIN — ASPIRIN 81 MG 81 MG: 81 TABLET ORAL at 09:17

## 2019-09-03 RX ADMIN — HYDRALAZINE HYDROCHLORIDE 75 MG: 50 TABLET, FILM COATED ORAL at 05:54

## 2019-09-03 RX ADMIN — SPIRONOLACTONE 50 MG: 25 TABLET ORAL at 21:25

## 2019-09-03 RX ADMIN — HEPARIN SODIUM 5000 UNITS: 5000 INJECTION INTRAVENOUS; SUBCUTANEOUS at 21:31

## 2019-09-03 RX ADMIN — HEPARIN SODIUM 5000 UNITS: 5000 INJECTION INTRAVENOUS; SUBCUTANEOUS at 06:18

## 2019-09-03 NOTE — PROGRESS NOTES
conducted a Follow up consultation and Spiritual Assessment for Elizabeth Haley, who is a 71 y. o.,male. The  provided the following Interventions:  Continued the relationship of care and support. Listened empathically. Offered prayer and assurance of continued prayer on patients behalf. Chart reviewed. The following outcomes were achieved:  Patient expressed gratitude for 's visit. Assessment:  There are no further spiritual or Mandaeism issues which require Spiritual Care Services interventions at this time. Plan:  Chaplains will continue to follow and will provide pastoral care on an as needed/requested basis.  recommends bedside caregivers page  on duty if patient shows signs of acute spiritual or emotional distress. Chaplain Donnell LOPEZ  [address] [facility]   [phone]

## 2019-09-03 NOTE — PROGRESS NOTES
Sw spoke with pt regarding dc plans. Pt is agreeable to leaving Thursday to [facility] ahead of pending weather. Sw spoke with Maddie Pena at [facility] who states that they are able to accomodate. Pt states that he has shared with his wife and they are both in agreement. Sw will follow.

## 2019-09-03 NOTE — ROUTINE PROCESS
SHIFT CHANGE NOTE FOR [facility]    Bedside and Verbal shift change report given to Trevor Vogel RN (oncoming nurse) by Gopal Shannon RN (offgoing nurse). Report included the following information SBAR, Kardex, MAR and Recent Results. Situation:   Code Status: Full Code   Reason for Admission: CVA  Hospital Day: 19   Problem List:   Hospital Problems  Date Reviewed: 8/30/2019          Codes Class Noted POA    Primary osteoarthritis of right ankle (Chronic) ICD-10-CM: M19.071  ICD-9-CM: 715.17  8/22/2019 Yes    Overview Signed 8/23/2019 11:54 AM by Norma Gleason MD     X-ray of the right ankle (8/22/2019) showed no acute fracture or subluxation; advanced degenerative changes at the tibiotalar joint; old fracture fragment vs. accessory ossicle in the inferior aspect of the lateral malleolus; soft tissue swelling around the ankle.              Vitamin D deficiency (Chronic) ICD-10-CM: E55.9  ICD-9-CM: 268.9  8/19/2019 Yes    Overview Signed 8/19/2019  1:34 PM by Norma Gleason MD     Vitamin D 25-Hydroxy (8/19/2019) = 16.2              Secondary hyperparathyroidism of renal origin ([facility] [address] (Chronic) ICD-10-CM: N25.81  ICD-9-CM: 588.81  8/18/2019 Yes    Overview Signed 8/28/2019  1:24 PM by Norma Gleason MD     PTH (8/18/2019) = 101.7             Leukocytosis ICD-10-CM: W90.512  ICD-9-CM: 288.60  8/16/2019 Yes    Overview Signed 8/16/2019 11:19 AM by Norma Gleason MD     WBC count (8/16/2019) = 17.9             Hypertensive heart and kidney disease without heart failure and with stage 2 chronic kidney disease (Chronic) ICD-10-CM: I13.10, N18.2  ICD-9-CM: 404.90, 585.2  Unknown Yes        Chronic hypokalemia ICD-10-CM: E87.6  ICD-9-CM: 276.8  Unknown Yes    Overview Addendum 8/15/2019 11:31 AM by Norma Gleason MD     Persistent chronic hypokalemia + hypertension; ?primary hyperaldosteronism             Type 2 diabetes mellitus with stage 2 chronic kidney disease (HCC) (Chronic) ICD-10-CM: E11.22, N18.2  ICD-9-CM: 250.40, 585.2  Unknown Yes    Overview Signed 8/15/2019 12:26 PM by Ami Cole MD     HbA1c (8/13/2019) = 6.6             Pure hypercholesterolemia (Chronic) ICD-10-CM: E78.00  ICD-9-CM: 272.0  8/15/2019 Yes    Overview Addendum 8/15/2019  6:27 PM by Ami Cole MD     Lipid profile (8/13/2019) showed , , HDL 42, ; Lipid profile (8/14/2019) showed , , HDL 39, LDL 94             Current use of aspirin ICD-10-CM: Z79.82  ICD-9-CM: V58.66  8/14/2019 Yes        On statin therapy due to risk of future cardiovascular event ICD-10-CM: Z79.899  ICD-9-CM: V58.69  8/14/2019 Yes    Overview Signed 8/15/2019 12:21 PM by Ami Cole MD     On Atorvastatin             Vitamin B12 deficiency anemia (Chronic) ICD-10-CM: D51.9  ICD-9-CM: 281.1  8/14/2019 Yes    Overview Signed 8/19/2019  1:24 PM by Ami Cole MD     Vitamin B12 (8/14/2019) = 208             Iron deficiency anemia (Chronic) ICD-10-CM: D50.9  ICD-9-CM: 280.9  8/14/2019 Yes    Overview Signed 8/19/2019  1:25 PM by Ami Cole MD     Anemia work-up (8/14/2019) showed serum iron 22, TIBC 371, iron % saturation 6, ferritin 34, reticulocyte count 1.4               Refusal of statin medication by patient ICD-10-CM: Z53.29  ICD-9-CM: V64.2  8/13/2019 Yes    Overview Signed 8/15/2019  2:01 PM by Ami Cole MD     As per note of Neurology (Dr. Lonnie Reed), patient refuses statin agent because of potential side effects.               * (Principal) Acute ischemic stroke [facility]) ICD-10-CM: I63.9  ICD-9-CM: 434.91  8/12/2019 Yes    Overview Signed 8/15/2019 12:17 AM by mAi Cole MD     Acute Ischemic Stroke (acute/early subacute infarct at the left posterior basal ganglia to corona radiata) with residual right hemiparesis, dysphagia and dysarthria             Impaired mobility and ADLs ICD-10-CM: Z74.09  ICD-9-CM: 799.89  8/12/2019 Yes        Received intravenous tissue plasminogen activator (tPA) in emergency department ICD-10-CM: Z92.82  ICD-9-CM: V45.88  8/12/2019 Yes        Right hemiparesis ([facility] [address] ICD-10-CM: G81.91  ICD-9-CM: 342.90  8/12/2019 Yes        Dysphagia ICD-10-CM: R13.10  ICD-9-CM: 787.20  8/12/2019 Yes        Dysarthria ICD-10-CM: R47.1  ICD-9-CM: 784.51  8/12/2019 Yes              Background:   Past Medical History:   Past Medical History:   Diagnosis Date    Acute ischemic stroke ([facility] [address] 8/12/2019    Acute Ischemic Stroke (acute/early subacute infarct at the left posterior basal ganglia to corona radiata) with residual right hemiparesis, dysphagia and dysarthria    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     On Allopurinol    Chronic hypokalemia     Persistent chronic hypokalemia + hypertension; ?primary hyperaldosteronism    CKD (chronic kidney disease) stage 2, GFR 60-89 ml/min 8/15/2019    Current use of aspirin 8/14/2019    Dysarthria 8/12/2019    Dysphagia 8/12/2019    Elevated prostate specific antigen (PSA) 7/21/2011    First degree atrioventricular block by electrocardiogram 8/12/2019    Hypertensive heart and kidney disease without heart failure and with stage 2 chronic kidney disease     Iron deficiency anemia 8/14/2019    Anemia work-up (8/14/2019) showed serum iron 22, TIBC 371, iron % saturation 6, ferritin 34, reticulocyte count 1.4     Obesity, Class I, BMI 30-34.9     Obstructive sleep apnea on CPAP     On statin therapy due to risk of future cardiovascular event 8/14/2019    On Atorvastatin    Primary osteoarthritis of right ankle 8/22/2019    X-ray of the right ankle (8/22/2019) showed no acute fracture or subluxation; advanced degenerative changes at the tibiotalar joint; old fracture fragment vs. accessory ossicle in the inferior aspect of the lateral malleolus; soft tissue swelling around the ankle.     Pure hypercholesterolemia 08/15/2019    Lipid profile (8/13/2019) showed , , HDL 42, ; Lipid profile (8/14/2019) showed , TC 173, HDL 39, LDL 94    Received intravenous tissue plasminogen activator (tPA) in emergency department 8/12/2019    Refusal of statin medication by patient 8/13/2019    As per note of Neurology (Dr. Sanford Alvarado), patient refuses statin agent because of potential side effects.  Right hemiparesis ([facility] [address] 8/12/2019    Secondary hyperparathyroidism of renal origin ([facility] [address] 8/18/2019    PTH (8/18/2019) = 101.7    Type 2 diabetes mellitus with stage 2 chronic kidney disease (HCC)     HbA1c (8/13/2019) = 6.6    Vitamin B12 deficiency anemia 8/14/2019    Vitamin B12 (8/14/2019) = 208    Vitamin D deficiency 8/19/2019    Vitamin D 25-Hydroxy (8/19/2019) = 16.2       Patient taking anticoagulants yes Heparin, ASA   Patient has a defibrillator: no     Assessment:   Changes in Assessment throughout shift: No     Patient has central line: no Reasons if yes: Insertion date: Last dressing date:   Patient has Boykin Cath: no Reasons if yes:     Insertion date:     Last Vitals:     Vitals:    09/02/19 0734 09/02/19 1319 09/02/19 1532 09/02/19 2109   BP: 116/56 150/74 136/62 157/75   Pulse: (!) 53 72 72 63   Resp: 20 18 18   Temp: 97.5 °F (36.4 °C)  99.1 °F (37.3 °C) 98.7 °F (37.1 °C)   SpO2: 96%  98% 97%   Weight:       Height:            PAIN    Pain Assessment    Pain Intensity 1: 0 (09/03/19 0000) Pain Intensity 1: 2 (12/29/14 1105)    Pain Location 1: Shoulder Pain Location 1: Abdomen    Pain Intervention(s) 1: Medication (see MAR) Pain Intervention(s) 1: Medication (see MAR)  Patient Stated Pain Goal: 0 Patient Stated Pain Goal: 0  o Intervention effective: no    o Other actions taken for pain:      Skin Assessment  Skin color Skin Color: Appropriate for ethnicity  Condition/Temperature Skin Condition/Temp: Dry, Warm  Integrity Skin Integrity: Intact  Turgor Turgor: Non-tenting  Weekly Pressure Ulcer Documentation  Pressure  Injury Documentation: No Pressure Injury Noted-Pressure Ulcer Prevention Initiated  Wound Prevention & Protection Methods  Orientation of wound Orientation of Wound Prevention: Posterior  Location of Prevention Location of Wound Prevention: Buttocks, Sacrum/Coccyx  Dressing Present Dressing Present : No  Dressing Status Dressing Status: Intact  Wound Offloading Wound Offloading (Prevention Methods): Bed, pressure redistribution/air     INTAKE/OUPUT  Date 09/02/19 0700 - 09/03/19 0659 09/03/19 0700 - 09/04/19 0659   Shift 8882-8467 8109-6294 24 Hour Total 2174-6391 1052-4589 24 Hour Total   INTAKE   P.O. 480  480        P. O. 480  480      Shift Total(mL/kg) 480(4.7)  480(4.7)      OUTPUT   Urine(mL/kg/hr)  1475 1475        Urine Voided  1475 1475        Urine Occurrence(s) 1 x 9 x 10 x      Stool           Stool Occurrence(s) 1 x 1 x 2 x      Shift Total(mL/kg)  1475(14.5) 1475(14.5)       -1475 -995      Weight (kg) 102 102 102 102 102 102       Recommendations:  1. Patient needs and requests:     2. Diet: Cardiac Mech Soft Nectar Thick     3. Pending tests/procedures:      4. Functional Level/Equipment:     5. Estimated Discharge Date: 8/24/19 Posted on Whiteboard in Patients Room: yes     HEALS Safety Check    A safety check occurred in the patient's room between off going nurse and oncoming nurse listed above.     The safety check included the below items  Area Items   H  High Alert Medications - Verify all high alert medication drips (heparin, PCA, etc.)   E  Equipment - Suction is set up for ALL patients (with yanker)  - Red plugs utilized for all equipment (IV pumps, etc.)  - WOWs wiped down at end of shift.  - Room stocked with oxygen, suction, and other unit-specific supplies   A  Alarms - Bed alarm is set for fall risk patients  - Ensure chair alarm is in place and activated if patient is up in a chair   L  Lines - Check IV for any infiltration  - Boykin bag is empty if patient has a Boykin   - Tubing and IV bags are labeled   S  Safety   - Room is clean, patient is clean, and equipment is clean. - Hallways are clear from equipment besides carts. - Fall bracelet on for fall risk patients  - Ensure room is clear and free of clutter  - Suction is set up for ALL patients (with diann)  - Hallways are clear from equipment besides carts.    - Isolation precautions followed, supplies available outside room, sign posted

## 2019-09-03 NOTE — ROUTINE PROCESS
0800 Pt. Awake sitting up in bed no change in assessment no signs of distress  Pt. Stated to be feeling fine. 0930 Pt. Sitting up in chair eating breakfast.  1200 pt. With therapy. 1330 able to transfer from bed to chair no difficulty. 1500 no change in assessment. 1800 Pt. Sitting up in chair eating dinner.

## 2019-09-03 NOTE — PROGRESS NOTES
[facility] PHYSICAL REHABILITATION  [address] [place] [address], [address]     INPATIENT REHABILITATION  DAILY PROGRESS NOTE     Date: 9/3/2019    Name: Frederick Santiago Age / Sex: 71 y.o. / male   CSN: [id number] MRN: [id number]   [facility] Date: 8/15/2019 Length of Stay: 19 days     Primary Rehab Diagnosis: Impaired Mobility and ADLs secondary to Acute Ischemic Stroke (acute/early subacute infarct at the left posterior basal ganglia to corona radiata) with residual right hemiparesis, dysphagia and dysarthria      Subjective:     Patient seen and examined. Blood pressure controlled. Blood glucose controlled. Team conference was held at bedside this PM.     Patient's Complaint:   No significant medical complaints    Pain Control: no current joint or muscle symptoms, essentially pain-free      Objective:     Vital Signs:  Patient Vitals for the past 24 hrs:   BP Temp Pulse Resp SpO2   09/03/19 0754 126/67 97.8 °F (36.6 °C) (!) 58 18 97 %   09/03/19 0554 178/78 -- 61 -- --   09/02/19 2109 157/75 98.7 °F (37.1 °C) 63 18 97 %   09/02/19 1532 136/62 99.1 °F (37.3 °C) 72 18 98 %   09/02/19 1319 150/74 -- 72 -- --        Physical Examination:  GENERAL SURVEY: Patient is awake, alert, oriented x 3, sitting comfortably on the chair, not in acute respiratory distress. HEENT: pink palpebral conjunctivae, anicteric sclerae, no nasoaural discharge, moist oral mucosa  NECK: supple, no jugular venous distention, no palpable lymph nodes  CHEST/LUNGS: symmetrical chest expansion, good air entry, clear breath sounds  HEART: adynamic precordium, good S1 S2, no S3, regular rhythm, no murmurs  ABDOMEN: obese, bowel sounds appreciated, soft, non-tender  EXTREMITIES: pink nailbeds, no edema except for right ankle swelling, full and equal pulses, no calf tenderness   NEUROLOGICAL EXAM: The patient is awake, alert and oriented x3, able to answer questions fairly appropriately, able to follow 1 and 2 step commands.   Able to tell time from the wall clock. Cranial nerves II-XII are grossly intact except for slightly shallow right nasolabial fold, dysarthria and dysphagia. No gross sensory deficit. Motor strength is 5/5 on the LUE and LLE, 0/5 on the RUE (except for 1/5 on the right shoulder), 2+/5 on RLE (except for 0/5 on the right ankle/foot).       Current Medications:  Current Facility-Administered Medications   Medication Dose Route Frequency    iron sucrose (VENOFER) 200 mg in 0.9% sodium chloride 100 mL IVPB  200 mg IntraVENous Q24H    labetalol (NORMODYNE) tablet 600 mg  600 mg Oral Q12H    hydrALAZINE (APRESOLINE) tablet 75 mg  75 mg Oral Q8H    baclofen (LIORESAL) tablet 10 mg  10 mg Oral TID    spironolactone (ALDACTONE) tablet 50 mg  50 mg Oral BID    cyanocobalamin tablet 1,000 mcg  1,000 mcg Oral DAILY    cholecalciferol (VITAMIN D3) capsule 5,000 Units  5,000 Units Oral DAILY    isosorbide dinitrate (ISORDIL) tablet 30 mg  30 mg Oral Q8H    hydrALAZINE (APRESOLINE) tablet 25 mg  25 mg Oral TID PRN    acetaminophen (TYLENOL) tablet 650 mg  650 mg Oral Q4H PRN    bisacodyl (DULCOLAX) tablet 10 mg  10 mg Oral Q48H PRN    heparin (porcine) injection 5,000 Units  5,000 Units SubCUTAneous Q8H    insulin lispro (HUMALOG) injection   SubCUTAneous TIDAC    aspirin chewable tablet 81 mg  81 mg Oral DAILY WITH BREAKFAST    amLODIPine (NORVASC) tablet 10 mg  10 mg Oral DAILY    atorvastatin (LIPITOR) tablet 80 mg  80 mg Oral QHS       Allergies:  No Known Allergies      Lab/Data Review:  Recent Results (from the past 24 hour(s))   GLUCOSE, POC    Collection Time: 09/02/19  4:45 PM   Result Value Ref Range    Glucose (POC) 138 (H) 70 - 110 mg/dL   GLUCOSE, POC    Collection Time: 09/02/19  9:07 PM   Result Value Ref Range    Glucose (POC) 142 (H) 70 - 110 mg/dL   GLUCOSE, POC    Collection Time: 09/03/19  7:45 AM   Result Value Ref Range    Glucose (POC) 108 70 - 110 mg/dL   GLUCOSE, POC    Collection Time: 09/03/19 11:12 AM   Result Value Ref Range    Glucose (POC) 127 (H) 70 - 110 mg/dL       Estimated Glomerular Filtration Rate:  On admission, estimated GFR based on a Creatinine of 1.20 mg/dl:              Using CKD-EPI = 63.1 mL/min/1.73m2              Using MDRD = 64.6 mL/min/1.73m2  Most recent estimated GFR, based on a Creatinine of 1.69 mg/dl on 9/2/2019:   Using CKD-EPI = 41.7 mL/min/1.73m2   Using MDRD = 43.5 mL/min/1.73m2       Assessment:     Primary Rehabilitation Diagnosis  1. Impaired Mobility and ADLs  2. Acute Ischemic Stroke (acute/early subacute infarct at the left posterior basal ganglia to corona radiata) with residual right hemiparesis, dysphagia and dysarthria     Comorbidities   Obesity, Class I, BMI 30-34.9 E66.9    Obstructive sleep apnea on CPAP G47.33, Z99.89    Hypertensive heart and kidney disease without heart failure and with stage 2 chronic kidney disease I13.10, N18.2    Chronic hypokalemia E87.6    CKD (chronic kidney disease) stage 2, GFR 60-89 ml/min N18.2    Current use of aspirin Z79.82    On statin therapy due to risk of future cardiovascular event Z79.899    Type 2 diabetes mellitus with stage 2 chronic kidney disease  E11.22, N18.2    Pure hypercholesterolemia E78.00    Elevated prostate specific antigen (PSA) R97.20    Refusal of statin medication by patient Z48.34    First degree atrioventricular block by electrocardiogram I44.0    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 20X0    Leukocytosis D72.829    Iron deficiency anemia D50.9    Vitamin B12 anemia D51.9    Vitamin D deficiency E55.9    Secondary hyperparathyroidism of renal origin N25.80        Plan:     1. Justification for continued stay: Good progression towards established rehabilitation goals. 2. Medical Issues being followed closely:    [x]  Fall and safety precautions     []  Wound Care     [x]  Bowel and Bladder Function     [x]  Fluid Electrolyte and Nutrition Balance     []  Pain Control      3. Issues that 24 hour rehabilitation nursing is following:    [x]  Fall and safety precautions     []  Wound Care     [x]  Bowel and Bladder Function     [x]  Fluid Electrolyte and Nutrition Balance     []  Pain Control      [x]  Assistance with and education on in-room safety with transfers to and from the bed, wheelchair, toilet and shower. 4. Acute rehabilitation plan of care:    [x]  Continue current care and rehab. [x]  Physical Therapy           [x]  Occupational Therapy           [x]  Speech Therapy     []  Hold Rehab until further notice     5. Medications:    [x]  MAR Reviewed     [x]  Continue Present Medications     6. DVT Prophylaxis:      []  Lovenox     [x]  Unfractionated Heparin     []  Coumadin     []  NOAC     []  SUZAN Stockings     []  Sequential Compression Device     []  None     7.  Orders:   > Acute Ischemic Stroke (acute/early subacute infarct at the left posterior basal ganglia to corona radiata) with residual right hemiparesis, dysphagia and dysarthria; S/P Administration of intravenous tPA (8/12/2019 - [address])   > On 8/22/2019, started Baclofen 5 mg PO TID   > On 8/27/2019, increased Baclofen from 5 mg to 10 mg PO TID   > Continue:    > Aspirin 81 mg PO once daily with breakfast    > Atorvastatin 80 mg PO q HS    > Baclofen 10 mg PO TID    > Chronic hypokalemia    Serum Potassium during hospital stay at [address]      08/15/19  0330 08/14/19  1848 08/14/19  0342 08/13/19  1205 08/12/19  2328   K 3.0* 2.8* 2.7* 2.8* 2.4*          > K (8/16/2019, on admission) = 3.8   > Mg (8/16/2019, on admission) = 1.9   > Upon admission to the ARU, patient was given Klor Con 40 meq PO BID with meals x 2 days   > K (8/17/2019) = 3.5   > K (8/18/2019) = 3.7   > K (8/19/2019) = 3.5   > K (8/22/2019) = 3.6   > Mg (8/22/2019) = 2.2   > On 8/22/2019, started Spironolactone 50 mg PO BID   > K (8/24/2019) = 3.8   > K (8/26/2019) = 4.4   > Will need to monitor serum potassium levels closely as patient is at high risk for hyperkalemia due slowly rising serum potassium levels since starting high-dose Spironolactone; planned to add HCTZ or Furosemide to offset the potassium-sparing characteristics of Spironolactone but will hold off due to rising BUN/Creatinine levels   > K (8/28/2019) = 4.6   > Mg (8/28/2019) = 2.3   > On 8/28/2019, discontinued Mg(OH)2 400 mg PO once daily   > K (8/29/2019) = 4.1   > K (9/2/2019) = 4.6   > Continue Spironolactone 50 mg PO BID    > Hypertensive heart and kidney disease without heart failure and with stage 2 chronic kidney disease   > Upon admission to the ARU, increased Hydralazine from 50 mg to 75 mg PO q 8 hr (6AM, 2PM, 10PM)   > Once work-up for primary hyperaldosteronism is complete, plan to start Spironolactone 25 mg PO once daily   > On 8/16/2019:    > Discontinued Metoprolol succinate 50 mg PO once daily (6AM)    > Started:     > Labetalol 200 mg PO q 12 hr (9AM, 9PM)     > Isosorbide dinitrate 10 mg P) q 8 hr (6AM, 2PM, 10PM)   > On 8/18/2019, added Hydralazine 25 mg PO TID PRN for SBP greater than 170 mmHg   > On 8/19/2019:    > Increased Labetalol from 200 mg to 300 mg PO q 12 hr (9AM, 9PM)    > Increased Isosorbide dinitrate from 10 mg to 20 mg PO q 8 hr (6AM, 2PM, 10PM)   > On 8/20/2019:    > Increased Labetalol from 300 mg to 400 mg PO q 12 hr (9AM, 9PM)    > Increased Isosorbide dinitrate from 20 mg to 30 mg PO q 8 hr (6AM, 2PM, 10PM)   > On 8/22/2019:    > Increased Labetalol from 400 mg to 600 mg PO q 12 hr (9AM, 9PM)    > Started Spironolactone 50 mg PO BID   > On 8/27/2019, increased Hydralazine from 75 mg to 100 mg PO q 8 hr (6AM, 2PM, 10PM)   > On 8/28/2019, decreased Hydralazine from 100 mg to 75 mg PO q 8 hr (6AM, 2PM, 10PM)   > Plan to add ACE-I or ARB for proteinuria once renal function stable and serum creatinine and serum potassium are no longer rising upwards   > Continue:    > Amlodipine 10 mg PO once daily (9AM)    > Hydralazine 75 mg PO q 8 hr (6AM, 2PM, 10PM)    > Labetalol 600 mg PO q 12 hr (change from 9AM, 9PM to 6AM, 6PM)    > Isosorbide dinitrate 30 mg PO q 8 hr (6AM, 2PM, 10PM)    > Spironolactone 50 mg PO BID (9AM, 9PM)    > Hydralazine 25 mg PO TID PRN for SBP greater than 170 mmHg    > Pure hypercholesterolemia   > Lipid profile (8/13/2019) showed , , HDL 42,    > Lipid profile (8/14/2019) showed , , HDL 39, LDL 94   > Continue Atorvastatin 80 mg PO q HS    > Type 2 diabetes mellitus with stage 2 chronic kidney disease   > HbA1c (4/16/2014) = 6.2   > Patient has had chronic kidney disease even before his diagnosis of Type 2 diabetes mellitus and is presumed to be due to prolonged uncontrolled hypertension   > HbA1c (8/13/2019) = 6.6   > Continue Humalog insulin sliding scale SC TID AC    >  ? Primary hyperaldosteronism (ruled out) as etiology of chronic hypokalemia and difficult to control hypertension   > Endocrinology consult (Dr. Ella Mccormack) was called at [facility] prior to discharge/transfer to the ARU   > Aldosterone/Renin activity (8/16/2019):    > Aldosterone = 8.8 (NORMAL)    > Renin Activity = 0.300 (NORMAL)    > Aldosterone/Renin Activity = 29 (ELEVATED) ~ EQUIVOCAL, requires confirmation    > Last intake of Losartan was on 8/11/2019 (prior to admission)    > Endocrinology consult (Dr. Hansa Pacheco):    > ASSESSMENT:     1. Chronic hypokalemia. 2.  Diabetes mellitus. 3.  Diabetic nephropathy with a stage II chronic renal disease. 4.  Normocytic anemia. 5.  Vitamin B12 deficiency. 6.  Borderline iron deficiency. 7.  Hypoalbuminemia.     > DISCUSSION:  The patient may well have hyperaldosteronism, but he may have it on the basis of his nephropathy, which is due to his underlying diabetes.   The cause of his anemia might be a combination of B12 deficiency and iron deficiency together, so investigation should be done for this.     > PLAN: Laboratory studies:     1.  24-hour urine for aldosteronism (8/20/2019) = 12.78 (N.V. = 0-19) (NORMAL)     2. Methylmalonic acid level (8/18/2019) = 468 (ELEVATED)     3.  Gastrin level (8/20/2019) = <10 (NORMAL)     4. Microalbumin-to-creatinine ratio. 5.  Transferrin and prealbumin levels. > FSH (8/19/2019) = 5.6 (NORMAL)    > LH (8/18/2019) = 6.8 (NORMAL)    > Free T4 (8/18/2019) = 1.0 (NORMAL)    > TSH (8/18/2019) = 4.63 (ELEVATED)    > Urine aldosterone (8/20/2019): 11.1 ug/L    > Transferrin (8/22/2019) = 275   > Excerpt from the Progress Note (dated 8/22/2019) by Dr. Frederick Nye:    > \"Griffin level is not elevated. 24 hour urine griffin is pending. The aldosterone/renin level is not high. If he has primary aldosteronism, it is very likely to be idiopathic rather an an aldosterone producing adenoma. This is susceptible to medical therapy. Will start spironolactone at 50 mg twice daily. \"    > On 8/22/2019, started Spironolactone 50 mg PO BID   > Unable to proceed with plasma aldosterone confirmatory tests as patient had been started on high-dose Spironolactone; unable to determine presence of adrenal adenoma in the absence of imaging studies   > Patient does not have Primary hyperaldosteronism as per Endocrinology   > Continue Spironolactone 50 mg PO BID    > Leukocytosis, resolved   > WBC count (8/15/2019) = 11.7   > No fever documented since admission to the ARU   > WBC count (8/16/2019, on admission) = 17.9   > Work-up:    > Urinalysis (8/16/2019): WNL    > Chest x-ray (PA-Lateral) (8/16/2019) showed a minimally blunted left posterior costophrenic angle could be due to either a tiny effusion or chronic pleural reaction/scarring; mild cardiomegaly; atherosclerosis.    > WBC count (8/18/2019) = 11.6    > Iron deficiency anemia / Vitamin B12 anemia   > Anemia work-up (8/14/2019) showed serum iron 22, TIBC 371, iron % saturation 6, ferritin 34, reticulocyte count 1.4   > Vitamin B12 (8/14/2019) = 208   > Hgb/Hct (8/15/2019) = 10.1/31.3    > Hgb/Hct (8/16/2019, on admission) = 10.7/32.6   > Hgb/Hct (8/18/2019) = 8.7/27.4   > Hgb/Hct (8/19/2019) = 8.4/25.2   > On 8/19/2019, started:     > FeSO4 325 mg PO once daily with breakfast     > Ascorbic Acid 250 mg PO once daily with breakfast (to enhance the absorption of the FeSO4)     > Cyanocobalamin 1,000 mcg PO once daily    > Epoetin tone 10,000 units SC x 1 dose   > Hgb/Hct (8/22/2019) = 8.6/26.3   > On 8/22/2019, Epoetin tone 10,000 units SC x 1 dose     > Hgb/Hct (8/26/2019) = 8.2/25.6   > On 8/26/2019, patient was given Epoetin tone 10,000 units SC x 1 dose     > On 8/27/2019, started Iron sucrose 50 mg IV q 24 hr   > On 8/28/2019:    > Discontinued:     > FeSO4 325 mg PO once daily with breakfast      > Ascorbic Acid 250 mg PO once daily with breakfast (to enhance the absorption of the FeSO4)     > Increased Iron sucrose from 50 mg to 200 mg IV q 24 hr   > Hgb/Hct (8/29/2019) = 8.0/25.5    > Vitamin B12 (8/29/2019) =  409 (from 208 on 8/14/2019)   > On 8/29/2019, discontinued Iron sucrose 200 mg IV q 24 hr   > On 8/30/2019, resumed FeSO4 325 mg PO once daily with breakfast    > On 9/2/2019:    > Discontinued FeSO4 325 mg PO once daily with breakfast     > Resumed Iron sucrose 200 mg IV q 24 hr x 3 doses   > Continue:    > Cyanocobalamin 1,000 mcg PO once daily    > Iron sucrose 200 mg IV q 24 hr (STOP DATE: 9/4/2019)    > Vitamin D Deficiency   > PTH (8/18/2019) = 101.7   > Vitamin D 25-Hydroxy (8/19/2019) = 16.2   > On 8/19/2019, patient was given Cholecalciferol 50,000 units PO x 1 dose    > On 8/20/2019, started Cholecalciferol 5,000 units PO once daily   > Continue Cholecalciferol 5,000 units PO once daily    > Right ankle swelling, most likely dependent edema due to hemiparesis/lack of muscle contraction and osteoarthritis of the right ankle   > (+) nonpainful swelling of the right ankle for the past few days; denies any trauma   > X-ray of the right ankle (8/22/2019) showed no acute fracture or subluxation; advanced degenerative changes at the tibiotalar joint; old fracture fragment vs. accessory ossicle in the inferior aspect of the lateral malleolus; soft tissue swelling around the ankle.   > Elevate right leg/foot when supine in bed    > Stage 2 chronic kidney disease --> Acute kidney injury superimposed on CKD stage 2 VS progression to Stage 3 chronic kidney disease   > On admission, estimated GFR based on a Creatinine of 1.20 mg/dl:               > Using CKD-EPI = 63.1 mL/min/1.73m2               > Using MDRD = 64.6 mL/min/1.73m2   > Estimated GFR, based on a Creatinine of 1.49 mg/dl on 8/26/2019:    > Using CKD-EPI = 48.6 mL/min/1.73m2    > Using MDRD = 57.4 mL/min/1.73m2    > Urine creatinine (8/26/2019) = 31.00   > Microalbumin, urine (8/26/2019) = 56.00   > Microalbumin/Creatinine ratio (8/26/2019) = 1806   > Nephrology consult (Dr. Evelina Rincon) for evaluation and comanagement    > Impression:     > Chronic kidney disease stage III with heavy proteinuria, diabetic nephropathy     > Proteinuria, diabetes related, not on any antiproteinuric medication      > Hypertension, well controlled     > Diabetes     > Recent acute ischemic stroke     > Chronic hypokalemia, on spiranolactone     > Anemia     > Secondary hyperparathyroidism    > Plan:     > I will start him on IV Venofer, side effects discussed. Plan to start on Epogen once his iron store is repleted. > Continue spiranolactone, monitor potassium.      > Monitor renal function for now, will add ACE inhibitor for protienuria in near future.       > Adjust medication for current renal function status.    > BUN/Creatinine (8/28/2019) = 37/1.69    > BUN/Creatinine (8/29/2019) = 36/1.54    > BUN/Creatinine (9/2/2019) = 39/1.69     > Diet:   > On 8/16/2019, solids consistency was advanced from Mechanical soft (NDD 2) to Soft solids (NDD 3)   > Modified barium swallow (8/27/2019) showed silent aspiration of thin liquid; no drew penetration or aspiration with other tested consistencies. > Specifications: Diabetic, low saturated fat   > Solids (consistency): Soft solids (NDD 3)   > Liquids (consistency): Mildly thick (Nectar-thick)        8. Patient's progress in rehabilitation and medical issues discussed with the patient. All questions answered to the best of my ability. Care plan discussed with patient, wife and nurse.       Signed:    Terry Collier MD    September 3, 2019

## 2019-09-03 NOTE — PROGRESS NOTES
Problem: Self Care Deficits Care Plan (Adult)  Goal: *Therapy Goal (Edit Goal, Insert Text)  Description  Occupational Therapy Goals   Short Term Goals=Long Term Goals  Initiated 2019 and to be accomplished within 4 week(s) (19)  1. Pt will perform self-feeding with MI.  2. Pt will perform grooming with Set-up. 3. Pt will perform UB bathing with SBA. 4. Pt will perform LB bathing with Luba/CGA. (; upgrade to SBA)  5. Pt will perform tub/shower transfer with Luba/CGA using LRAD. 6. Pt will perform UB dressing with Set-up. (; upgrade to MI)  7. Pt will perform LB dressing with Luba/CGA. (; upgrade to SBA)  8. Pt will perform toileting task with Luba/CGA. (; upgrade to SBA)  9. Pt will perform toilet transfer with Luba/CGA using LRAD. Short Term Goals   Initiated 2019 and to be accomplished within 7 day(s) (Updated )  1. Pt will perform self-feeding with S; using compensatory strategies for right hand incorporation into set-up. ()  2. Pt will perform simple grooming task, including denture mgmt, with Luba and compensatory strategies as needed. (; washing face- pt reporting spouse previously assisted with denture mgmt)  3. Pt will perform UB bathing with Luba using AE as needed. ()  4. Pt will perform LB bathing with ModA using AE and compensatory strategies as needed. ()  5. Pt will perform tub/shower transfer with modA x 1 <>TTB. ()  6. Pt will perform UB dressing with CGA. ()  7. Pt will perform LB dressing with ModA using AE as needed. ()  8. Pt will perform toileting task with ModA. ()  9. Pt will perform toilet transfer with ModA x 1 <>3:1 commode. ()        Outcome: Progressing Towards Goal   OCCUPATIONAL THERAPY TREATMENT    Patient: Tasha Scanlon   71 y.o.     Patient identified with name and : yes    Date: 9/3/2019    First Tx Session  Time In: 0800  Time Out[de-identified] 0900    Second Tx Session  Time In: 36  Time Out[de-identified] 1200    Diagnosis: Acute ischemic stroke [facility]) [I63.9]   Precautions: Aspiration, Fall   Chart, occupational therapy assessment, plan of care, and goals were reviewed. Pain:  Pt reports 0/10 pain or discomfort prior to treatment. Pt reports 0/10 pain or discomfort post treatment. Intervention Provided: NA      SUBJECTIVE:   Patient stated It feels like you're pushing me, I don't think I am doing any of the work, referring to OT (A) at elbow jt to clear table during NMRE task pushing cone off of table. Pt assured he is initiating motor movement and OT asst only to prevent forearm irritation. OBJECTIVE DATA SUMMARY:   Pt approached for tx seated in w/c in therapy gym, pt agreeable. Pt ended session seated in w/c in dining room for breakfast.   Pt re approached for session seated in w/c present in gym, pt agreeable. THERAPEUTIC ACTIVITY Daily Assessment   1st session:     Pt performed shoulder isometrics to strengthen proximal musculature for increased strength and function for right UE for increased participation/home carryover in ADL and functional transfers. Pt provided with shoulder isometric hand out for home exercise carryover. Pt educated on shoulder flexion, extension, abd/adduction, internal/ext rotation with compensatory strategies x 10 repetitions. Pt educated on performing these exercises for neuroplasticity repetition out of therapy times with pt demo'ing understanding. NMRE Daily Assessment   1st session:     Pt performed NMRE to facilitate bimanual integration and motor coordination to the right UE necessary for functional return. 2nd session:  Pt performed UB arm bike, with right hand wrapped via ace bandage, x 10 minutes at light resistance. Performed stacking cones from R<>L x 11 cones with interlaced fingers. Pt required time to re lace hands every 2-3 cones for success. Performed pushing cone off of lowered table with right UE x 10 repetitions with OT (A) at elbow jt to clear table. Sitting EOM, e-stim placed on tricep muscle at level 42, at indicated settings, with pt instructed during \"on\" times to perform elbow>hand push up with right UE. Pt performed x 10 minutes with OT holding on to left UE to increase challenge and isolate left UE motor initiation. FEEDING/EATING Daily Assessment   1st session:  Feeding/Eating  Feeding/Eating Assistance: 5 (Supervision/setup)  Comments: Pt required (A) opening containers. Pt educated on placing silverware under right arm as stabilizer to be able to open silverware with good carryover. Pt able to use utensils I'ly to self feed. MOBILITY/TRANSFERS Daily Assessment   2nd session:  Performed w/c<>EOM transfer with Michael to stand and (A) with right LE movement during step transfer. Pt demo'ing increased safety and movement with transfer back to w/c when educated on slowing down movement for good weight shifting and for incr success with left LE step. ASSESSMENT:  Pt benefiting from use of e-stim to (A) with tricep activation during functional right UE push up from lateral lean to midline. Pt continues to require education regarding proximal return prior to distal, appearing discouraged his hand motor function has not returned. Pt to continue progressing in (I) in ADLs and functional transfers. Progression toward goals:  ?          Improving appropriately and progressing toward goals  ? Improving slowly and progressing toward goals  ? Not making progress toward goals and plan of care will be adjusted     PLAN:  Patient continues to benefit from skilled intervention to address the above impairments. Continue treatment per established plan of care. Discharge Recommendations:  [facility]  Further Equipment Recommendations for Discharge:  Bariatric 3:1, TTB     Activity Tolerance:  Good       Estimated LOS: 9/6/19    Please refer to the flow sheet for vital signs taken during this treatment.   After treatment:   ?  Patient left in no apparent distress sitting up in chair   ? Patient left in no apparent distress in bed  ? Call bell left within reach  ? Nursing notified  ? Caregiver present  ? Bed alarm activated    COMMUNICATION/EDUCATION:   ? Home safety education was provided and the patient/caregiver indicated understanding. ? Patient/family have participated as able in goal setting and plan of care. ? Patient/family agree to work toward stated goals and plan of care. ? Patient understands intent and goals of therapy, but is neutral about his/her participation. ? Patient is unable to participate in goal setting and plan of care.       General Ax, OT

## 2019-09-03 NOTE — INTERDISCIPLINARY ROUNDS
[facility] PHYSICAL REHABILITATION  [address] [place] [address], [address]    INPATIENT REHABILITATION  TEAM CONFERENCE SUMMARY     Date of Conference: 9/3/2019    Patient Information:        Name: Ranulfo Mcclendon Age / Sex: 71 y.o. / male   CSN: [id number] MRN: [id number]   [facility] Date: 8/15/2019 Length of Stay: 19 days     Primary Rehabilitation Diagnosis  1. Impaired Mobility and ADLs  2. Acute Ischemic Stroke (acute/early subacute infarct at the left posterior basal ganglia to corona radiata) with residual right hemiparesis, dysphagia and dysarthria     Comorbidities   Obesity, Class I, BMI 30-34.9 E66.9    Obstructive sleep apnea on CPAP G47.33, Z99.89    Hypertensive heart and kidney disease without heart failure and with stage 2 chronic kidney disease I13.10, N18.2    Chronic hypokalemia E87.6    CKD (chronic kidney disease) stage 2, GFR 60-89 ml/min N18.2    Current use of aspirin Z79.82    On statin therapy due to risk of future cardiovascular event Z79.899    Type 2 diabetes mellitus with stage 2 chronic kidney disease  E11.22, N18.2    Pure hypercholesterolemia E78.00    Elevated prostate specific antigen (PSA) R97.20    Refusal of statin medication by patient Z48.34    First degree atrioventricular block by electrocardiogram I44.0    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 20X0    Leukocytosis D72.829    Iron deficiency anemia D50.9    Vitamin B12 anemia D51.9    Vitamin D deficiency E55.9    Secondary hyperparathyroidism of renal origin N25.81          Therapy:     FIM SCORES Initial Assessment Weekly Progress Assessment 9/3/2019   Eating Functional Level: 5  Comments: Pt reporting requiring set-up of container mgmt for self feeding. Pt able to use dominant left hand to manage utensils.       Swallowing     Grooming 3     Bathing 2        Upper Body Dressing Functional Level: 4  Items Applied/Steps Completed: Pullover (4 steps)  Comments: Pt educated on karthik technique to cecily shirt with Michael overall. Pt doffed shirt with SBA. Lower Body Dressing Functional Level: 2  Items Applied/Steps Completed: Elastic waist pants (3 steps), Sock, left (1 step), Sock, right (1 step), Underpants (3 steps)  Comments: Pt doffed socks with modA, donned socks with TA. LB clothing score reflected from pt performing toileting task, requiring maxA for clothing mgmt      Toileting Functional Level: 2  Comments: Pt able to initiate to wipe but requiring MaxA for thoroughness. Pt required maxA overall for clothing mgmt with pt initiating to assist. Pt encouraged to focus on steadying balance in standing with additional person present assisting for clothing and buttocks cleansing. Bladder - level of assist 5 6   Bladder - accident frequency score 6 6   Bowel - level of assist 2 4   Bowel - accident frequency score 6     Toilet Transfer [place] Toilet Transfer Score: 1  Comments: modA x 2. Second staff member present for safety and (A) with initial sit<>stand. Tub/Shower Transfer [place] Tub or Shower Type: Tub/Shower combination  Tub/Shower Transfer Score: 0  Comments: NT 2/2 to safety and decreased functional transfer (I).  Bathing performed at sink this AM        Comprehension Primary Mode of Comprehension: Auditory  Score: 4 Auditory  55   Expression Primary Mode of Expression: Verbal  Score: 4  Comments: Pt requiring increased time and repetitions to make needs known 2/2 to facial droop impeding expression  Verbal  54   Social Interaction Score: 4  Comments: Pt interacting appropriately with OT  55   Problem Solving Score: 4  Comments: Pt requiring Michael for problem solving body mechanics prior to toileting transfer  44   Memory Score: 4 54     FIM SCORES Initial Assessment Weekly Progress Assessment 9/3/2019   Bed/Chair/Wheelchair Transfers Transfer Type: (stand step without assistive device)  Other: stand step txfr without AD  Transfer Assistance : 2 (Maximal assistance)  Sit to Stand Assistance: Maximum assistance  Car Transfers: Not tested     Bed Mobility Rolling Right 6 (Modified independent)   Rolling Left 3 (Moderate assistance )   Supine to Sit 3 (Moderate assistance)   Sit to Stand Maximum assistance   Sit to Supine 3 (Moderate assistance)    Rolling Right    6 mod I   Rolling Left    4 min A   Supine to Sit    min A/CGA   Sit to Stand       Sit to Supine    min A/CGA      Locomotion (W/C) Able to Propel (ft): 300 feet  Functional Level: 5  Curbs/Ramps Assist Required (FIM Score): 0 (Not tested)  Wheelchair Setup Assist Required : 3 (Moderate assistance)(for right brake and leg rest)  Wheelchair Management: Manages left brake Function    Setup Assistance    propels >200 feet mod I on even surfaces after right leg rest is set up, pt is able to manage brakes independently     Locomotion (W/C distance)    200 feet   Locomotion (Walk) 2 (Maximal assistance)        Locomotion (Walk dist.) 3 Feet (ft)     Steps/Stairs Steps/Stairs Ambulated (#): 0  Level of Assist : 0 (Not tested)  NT         Nursing:     Neuro:   AAA&O x  4          Respiratory:   [x] WNL   [] O2 LPM:   Other:  Peripheral Vascular:   [] TEDS present   [x] Edema present ____ Grade   Cardiac:   [x] WNL   [] Other  Genitourinary:   [x] continent   [] incontinent   [] perkins  Abdominal _9/1/19______ LBM  GI: _Cardiac soft______ Diet __nectar____ Liquids _____ tube feeds  Musculoskeletal: ____ ROM Transfers __wheelchair___ Assistive Device Used  _1___ Level of Assistance  Skin Integumentary:   [x] Intact   [] Not Intact   __________Preventative Measures  Details______________________________________________________________  Pain: [x] Controlled   [] Not Controlled   Pain Meds:   [] Scheduled   [x] PRN        Registered Dietitian / Nutrition:     Patient Vitals for the past 100 hrs:   % Diet Eaten   09/02/19 1453 100 %   09/02/19 0952 100 %   09/01/19 0957 100 %   08/31/19 1300 75 %   08/31/19 0900 80 %   08/30/19 1548 100 % Pt with therapy at time of visit. Has good meal intake per chart documentation. Supplements:          [] Yes   [x] No      Amount of supplement consumed:  Not applicable      Intake/Output Summary (Last 24 hours) at 9/3/2019 1245  Last data filed at 9/3/2019 0700  Gross per 24 hour   Intake 240 ml   Output 2075 ml   Net -1835 ml                                Last bowel movement: 9/1      Interdisciplinary Team Goals:     1. Discipline  Physical Therapy    Goal  Patient will perform sit to stand transfers consistently at [address] level    Barrier  decreased strength, balance, and activity tolerance    Intervention  therex, theract, transfer training, patient education    Goal written by:   Caro Grijalva, PT     2. Discipline  Occupational Therapy    Goal  Pt will verbalize ways to incorporate right UE into ADL bathing/dressing/grooming w/o OT cues to increase cognitive recall of karthik/compensatory strategies. Barrier  Poor recall, learned non use    Intervention  Re education, state back method, NMRE, caregiver training    Goal written by:  Iggy Gibbs, OTR/L     3. Discipline  Speech Therapy    Goal  Patient will consistently slow rate of PO intake and take small boluses with min cues from the SLP/    Barrier  Moderate dyspahgia    Intervention  strengthening exercises, compensatory techniques, monitoring at meal time and advancement of diet as tolerated, MBS, training in safe swallowing techniques,     Goal written by:  Martin Higuera CCC-SLP     4. Discipline  Nursing    Goal  Maintain/increase strength and function of affected or compensatory body part    Barrier  weakness; paresthesia    Intervention  Assist patient with exercise and perform ROM exercises for both the affected and unaffected sides. Teach and encourage patient to use his unaffected side to exercise his affected side    Goal written by:  Alice Sun RN     5.   Discipline  Clinical Psychology    Goal  Continue stroke education; address decreased self esteem    Barrier  Limited insight about all recovery and situational stress with illness    Intervention  Patient/stroke education and support     Goal written by:  Sherine Valdez, PhD     6. Discipline  Nutrition / Dietetics    Goal  Po intake of meals will meet >75% of patient estimated nutritional needs within the next 7 days. Outcome:  [x] Met/Ongoing    [] Progressing towards goal    [] Not progressing towards goal    [] New/Initial goal     Barrier  none known     Intervention  continue po diet    Goal written by:  Rochelle Parry RD       Disposition / Discharge Planning:      Follow-up services:  [x] Physical Therapy             [x] Occupational Therapy       [x] Speech Therapy           [] Skilled Nursing      [] Medical Social Worker   [] Aide        [] Outpatient     [] Home Health   [] [address]   [x] [facility] recommendations:  None   Estimated discharge date:  9/5/2019   Discharge Location:  [] Home   [x] [facility]   [] [facility]             [] Other:          Interdisciplinary team rounds were held this PM with the following team members:       Name   Physical Therapist    Jenni Moreland, PT, DPT   Occupational Therapist    Carli Phelps, OTR/L   Speech Therapist    Arnaud Mendoza, [address] [facility]   Recreational Therapist    Carol Brownlee, [address] [facility], RN   Dietitian    Rochelle Parry RD   Physician    Josh Ann MD        Aakash Louis, MSW       Signed:  Josh Ann MD    September 3, 2019

## 2019-09-03 NOTE — PROGRESS NOTES
[x] Psychology  [] Social Work [] Recreational Therapy    INTERVENTION  UNITS/TIME OF SERVICE   Assessment    Supportive Counseling August 30, 2019   Orientation    Discharge Planning    Resource Linkage              Progress/Current Status    Late entry for patient attending stroke support group on ARU on above referenced date. Patient able to participate in discussion about his effort on ARU as well as general issues related to stroke occurrence and recovery. Patient maintained good attention to task and was active in his verbalization and expression of concerns for himself.     Ky Muñiz, THE [facility] 9/3/2019 10:28 AM

## 2019-09-03 NOTE — PROGRESS NOTES
Writer called Frank Burgess from Respiratory Team to see if he has a CPAP mask that will fit the patient's home CPAP machine. He stated that he's not sure if he does, but he will come and look at the machine and let us know. Arcelia Tarango,  stated that the patient told her that the mask is old and it's causing breakdown to the bridge of his nose; she's not able to get a new mask through his home CPAP company. Discussed with Delmi Tellez nurse. Encouraged that a mepilex is placed to the bridge of his nose to protect his skin.     Edilson Louie MSN, RN, [address] [place] [address]  [phone]

## 2019-09-03 NOTE — PROGRESS NOTES
Problem: Mobility Impaired (Adult and Pediatric)  Goal: *Acute Goals and Plan of Care (Insert Text)  Description  Physical Therapy Short Term Goals  Initiated 8/16/2019 and to be accomplished within 14 day(s)  1. Patient will move from supine to sit and sit to supine  in bed with minimal assistance/contact guard assist. -met 8/30/19  2. Patient will transfer from bed to chair and chair to bed with minimal/moderate assistance using the least restrictive device. - met 8/30/19  3. Patient will perform sit to stand with minimal assistance/moderate assistance. - met 8/30/19  4. Patient will ambulate with moderate assistance  for 10 feet with the least restrictive device. -met 8/30/19    Physical Therapy Short Term Goals  Initiated 8/30/2019 and to be accomplished within 7 day(s)  1. Patient will move from supine to sit and sit to supine  in bed with supervision/set-up. 2.  Patient will transfer from bed to chair and chair to bed with minimal assistance/contact guard assist using the least restrictive device. 3.  Patient will perform sit to stand with minimal assistance/contact guard assist from w/c height. 4.  Patient will ambulate with minimal assistance/contact guard assist for 60 feet with the least restrictive device. Physical Therapy Goals  Initiated 8/16/2019 and to be accomplished within 4 weeks  1. Patient will move from supine to sit and sit to supine  in bed with supervision/set-up. 2.  Patient will transfer from bed to chair and chair to bed with supervision/set-up using the least restrictive device. 3.  Patient will perform sit to stand with supervision/set-up. 4.  Patient will ambulate with minimal assistance for 50 feet with the least restrictive device. 5.  Patient will ascend/descend 2 stairs with 1 handrail(s) with moderate assistance .         Outcome: Progressing Towards Goal   PHYSICAL THERAPY TREATMENT    Patient: Janae Patel (18 y.o. male)  Date: 9/3/2019  Diagnosis: Acute ischemic stroke (HCC) [I63.9] Acute ischemic stroke [facility])  Precautions: Aspiration, NWB  Chart, physical therapy assessment, plan of care and goals were reviewed. Time In: 930  Time Out: 1100  Patient Seen For: Gait training; Therapeutic exercise;Transfer training; Wheelchair mobility; Patient education;Balance activities  Pain:  Pt pain was reported as  0 pre-treatment. Pt pain was reported as 0 post-treatment. Intervention: n/a    Patient identified with name and :yes    SUBJECTIVE:     \"I feel like I'm going backwards. \"    OBJECTIVE DATA SUMMARY:   Objective:     GROSS ASSESSMENT Daily Assessment            BED/MAT MOBILITY Daily Assessment            TRANSFERS Daily Assessment    During sit to stand transfer from w/c patient required facilitation at the posterior of his right thigh to assist with knee flexion;  when standing from the mat (23 inch surface) patient does not require this. Pt required step-by step cues for movement of lower extremities and weight shifting during stand step transfers Other: stand step without assistive device  Transfer Assistance : 3 (Moderate assistance )  Sit to Stand Assistance:  Moderate assistance       GAIT Daily Assessment    Amount of Assistance: 4 (Minimal assistance)  Distance (ft): 30 Feet (ft)  Assistive Device: Cane, quad    Pt was able to stabilize right LE properly without hyperextending at the knee approximately 50% of the time with verbal and tactile cues for proper muscle contraction and weight shifting       STEPS or STAIRS Daily Assessment            BALANCE Daily Assessment    Sitting - Static: Good (unsupported)  Sitting - Dynamic: Fair (occasional)  Standing - Static: (fair-)  Standing - Dynamic : Impaired       WHEELCHAIR MOBILITY Daily Assessment    Able to Propel (ft): 300 feet  Functional Level: 5  Wheelchair Setup Assist Required : 4 (Minimal assistance)(for right leg rest)  Wheelchair Management: Manages left brake;Manages right brake THERAPEUTIC EXERCISES Daily Assessment   Nu-step machine for 6 minutes to increase right LE contraction and reciprocal movement pattern in LEs Extremity: Right  Exercise Type #1: Seated lower extremity strengthening  Sets Performed: 2  Reps Performed: 10  Level of Assist: Minimal assistance       Neuro Re-Education:  Standing weight shifting with tactile facilitation for hip and knee extension and upright trunk posture. Pt is able to hold terminal knee extension without hyperextension on the right for 3-5 seconds at a time. Standing with left LE on 3 inch step to increase weight bearing on right LE, pt required mod A to achieve position and for balance and was only able to perform for approximately 20 seconds. Sit to stand from 23 inch mat table 3 sets of 5 reps with CGA and cues for proper weight shifting and LE alignment. ASSESSMENT:  Pt is requiring increased cues for slow and controlled movement and weight shifting during standing transfers. Progression toward goals:  ?      Improving appropriately and progressing toward goals  ? Improving slowly and progressing toward goals  ? Not making progress toward goals and plan of care will be adjusted     PLAN:  Patient continues to benefit from skilled intervention to address the above impairments. Continue treatment per established plan of care. Discharge Recommendations:  [facility]  Further Equipment Recommendations for Discharge:  N/A     Estimated LOS:9/5/19    Activity Tolerance:   fair  Please refer to the flowsheet for vital signs taken during this treatment.     After treatment:   Patient left propelling w/c down the hallway      [employer], PT  9/3/2019

## 2019-09-04 LAB
GLUCOSE BLD STRIP.AUTO-MCNC: 103 MG/DL (ref 70–110)
GLUCOSE BLD STRIP.AUTO-MCNC: 113 MG/DL (ref 70–110)
GLUCOSE BLD STRIP.AUTO-MCNC: 128 MG/DL (ref 70–110)
GLUCOSE BLD STRIP.AUTO-MCNC: 137 MG/DL (ref 70–110)

## 2019-09-04 PROCEDURE — 97112 NEUROMUSCULAR REEDUCATION: CPT

## 2019-09-04 PROCEDURE — 74011250637 HC RX REV CODE- 250/637: Performed by: INTERNAL MEDICINE

## 2019-09-04 PROCEDURE — 97116 GAIT TRAINING THERAPY: CPT

## 2019-09-04 PROCEDURE — 92526 ORAL FUNCTION THERAPY: CPT

## 2019-09-04 PROCEDURE — 92507 TX SP LANG VOICE COMM INDIV: CPT

## 2019-09-04 PROCEDURE — 97535 SELF CARE MNGMENT TRAINING: CPT

## 2019-09-04 PROCEDURE — 65310000000 HC RM PRIVATE REHAB

## 2019-09-04 PROCEDURE — 74011250636 HC RX REV CODE- 250/636: Performed by: FAMILY MEDICINE

## 2019-09-04 PROCEDURE — 82962 GLUCOSE BLOOD TEST: CPT

## 2019-09-04 PROCEDURE — 74011250636 HC RX REV CODE- 250/636: Performed by: INTERNAL MEDICINE

## 2019-09-04 PROCEDURE — 97542 WHEELCHAIR MNGMENT TRAINING: CPT

## 2019-09-04 PROCEDURE — 97110 THERAPEUTIC EXERCISES: CPT

## 2019-09-04 PROCEDURE — 74011000258 HC RX REV CODE- 258: Performed by: FAMILY MEDICINE

## 2019-09-04 RX ORDER — GUAIFENESIN 100 MG/5ML
81 LIQUID (ML) ORAL
Qty: 30 TAB | Refills: 0 | Status: SHIPPED
Start: 2019-09-05

## 2019-09-04 RX ORDER — HYDRALAZINE HYDROCHLORIDE 25 MG/1
25 TABLET, FILM COATED ORAL EVERY 8 HOURS
Status: ON HOLD | COMMUNITY
End: 2019-09-04 | Stop reason: SDUPTHER

## 2019-09-04 RX ORDER — BACLOFEN 10 MG/1
10 TABLET ORAL 3 TIMES DAILY
Qty: 90 TAB | Refills: 0 | Status: SHIPPED
Start: 2019-09-05 | End: 2019-10-05

## 2019-09-04 RX ORDER — ISOSORBIDE DINITRATE 30 MG/1
30 TABLET ORAL EVERY 8 HOURS
Qty: 90 TAB | Refills: 0 | Status: SHIPPED
Start: 2019-09-04 | End: 2020-02-14

## 2019-09-04 RX ORDER — HYDRALAZINE HYDROCHLORIDE 25 MG/1
75 TABLET, FILM COATED ORAL EVERY 8 HOURS
Qty: 270 TAB | Refills: 0 | Status: SHIPPED
Start: 2019-09-04

## 2019-09-04 RX ORDER — CHOLECALCIFEROL (VITAMIN D3) 125 MCG
5000 CAPSULE ORAL DAILY
Qty: 30 CAP | Refills: 0 | Status: SHIPPED
Start: 2019-09-05 | End: 2021-05-28

## 2019-09-04 RX ORDER — POTASSIUM CHLORIDE 750 MG/1
10 TABLET, FILM COATED, EXTENDED RELEASE ORAL DAILY
COMMUNITY
End: 2019-09-05

## 2019-09-04 RX ORDER — SPIRONOLACTONE 50 MG/1
50 TABLET, FILM COATED ORAL 2 TIMES DAILY
Qty: 60 TAB | Refills: 0 | Status: SHIPPED
Start: 2019-09-04 | End: 2020-02-14

## 2019-09-04 RX ORDER — LANOLIN ALCOHOL/MO/W.PET/CERES
1000 CREAM (GRAM) TOPICAL DAILY
Qty: 30 TAB | Refills: 0 | Status: SHIPPED
Start: 2019-09-05 | End: 2020-02-14

## 2019-09-04 RX ORDER — ATORVASTATIN CALCIUM 80 MG/1
80 TABLET, FILM COATED ORAL
Qty: 30 TAB | Refills: 0 | Status: SHIPPED
Start: 2019-09-04 | End: 2022-06-07

## 2019-09-04 RX ORDER — LABETALOL 300 MG/1
600 TABLET, FILM COATED ORAL EVERY 12 HOURS
Qty: 120 TAB | Refills: 0 | Status: SHIPPED
Start: 2019-09-05 | End: 2020-02-14

## 2019-09-04 RX ADMIN — HEPARIN SODIUM 5000 UNITS: 5000 INJECTION INTRAVENOUS; SUBCUTANEOUS at 21:50

## 2019-09-04 RX ADMIN — SPIRONOLACTONE 50 MG: 25 TABLET ORAL at 08:06

## 2019-09-04 RX ADMIN — HYDRALAZINE HYDROCHLORIDE 75 MG: 50 TABLET, FILM COATED ORAL at 13:46

## 2019-09-04 RX ADMIN — ATORVASTATIN CALCIUM 80 MG: 40 TABLET, FILM COATED ORAL at 21:52

## 2019-09-04 RX ADMIN — ISOSORBIDE DINITRATE 30 MG: 20 TABLET ORAL at 06:08

## 2019-09-04 RX ADMIN — BACLOFEN 10 MG: 10 TABLET ORAL at 12:32

## 2019-09-04 RX ADMIN — HEPARIN SODIUM 5000 UNITS: 5000 INJECTION INTRAVENOUS; SUBCUTANEOUS at 13:45

## 2019-09-04 RX ADMIN — LABETALOL HYDROCHLORIDE 600 MG: 200 TABLET, FILM COATED ORAL at 17:25

## 2019-09-04 RX ADMIN — HYDRALAZINE HYDROCHLORIDE 75 MG: 50 TABLET, FILM COATED ORAL at 21:52

## 2019-09-04 RX ADMIN — HYDRALAZINE HYDROCHLORIDE 75 MG: 50 TABLET, FILM COATED ORAL at 06:05

## 2019-09-04 RX ADMIN — CYANOCOBALAMIN TAB 1000 MCG 1000 MCG: 1000 TAB at 08:07

## 2019-09-04 RX ADMIN — ISOSORBIDE DINITRATE 30 MG: 20 TABLET ORAL at 21:52

## 2019-09-04 RX ADMIN — AMLODIPINE BESYLATE 10 MG: 10 TABLET ORAL at 08:07

## 2019-09-04 RX ADMIN — SPIRONOLACTONE 50 MG: 25 TABLET ORAL at 21:52

## 2019-09-04 RX ADMIN — BACLOFEN 10 MG: 10 TABLET ORAL at 17:25

## 2019-09-04 RX ADMIN — ISOSORBIDE DINITRATE 30 MG: 20 TABLET ORAL at 13:46

## 2019-09-04 RX ADMIN — IRON SUCROSE 200 MG: 20 INJECTION, SOLUTION INTRAVENOUS at 17:54

## 2019-09-04 RX ADMIN — ACETAMINOPHEN 650 MG: 325 TABLET ORAL at 21:51

## 2019-09-04 RX ADMIN — BACLOFEN 10 MG: 10 TABLET ORAL at 06:00

## 2019-09-04 RX ADMIN — LABETALOL HYDROCHLORIDE 600 MG: 200 TABLET, FILM COATED ORAL at 06:09

## 2019-09-04 RX ADMIN — HEPARIN SODIUM 5000 UNITS: 5000 INJECTION INTRAVENOUS; SUBCUTANEOUS at 06:05

## 2019-09-04 RX ADMIN — ASPIRIN 81 MG 81 MG: 81 TABLET ORAL at 08:06

## 2019-09-04 RX ADMIN — Medication 5000 UNITS: at 08:07

## 2019-09-04 NOTE — ROUTINE PROCESS
SHIFT CHANGE NOTE FOR [facility]    Bedside and Verbal shift change report given to Kayden Ku (oncoming nurse) by Carlos Manuel Edmonds, MAURO (offgoing nurse). Report included the following information SBAR, Kardex, MAR and Recent Results. Situation:   Code Status: Full Code   Reason for Admission: CVA  Hospital Day: 19   Problem List:   Hospital Problems  Date Reviewed: 9/3/2019          Codes Class Noted POA    Primary osteoarthritis of right ankle (Chronic) ICD-10-CM: M19.071  ICD-9-CM: 715.17  8/22/2019 Yes    Overview Signed 8/23/2019 11:54 AM by Benito Jones MD     X-ray of the right ankle (8/22/2019) showed no acute fracture or subluxation; advanced degenerative changes at the tibiotalar joint; old fracture fragment vs. accessory ossicle in the inferior aspect of the lateral malleolus; soft tissue swelling around the ankle.              Vitamin D deficiency (Chronic) ICD-10-CM: E55.9  ICD-9-CM: 268.9  8/19/2019 Yes    Overview Signed 8/19/2019  1:34 PM by Benito Jones MD     Vitamin D 25-Hydroxy (8/19/2019) = 16.2              Secondary hyperparathyroidism of renal origin ([address] (Chronic) ICD-10-CM: N25.81  ICD-9-CM: 588.81  8/18/2019 Yes    Overview Signed 8/28/2019  1:24 PM by Benito Jones MD     PTH (8/18/2019) = 101.7             Leukocytosis ICD-10-CM: F72.877  ICD-9-CM: 288.60  8/16/2019 Yes    Overview Signed 8/16/2019 11:19 AM by Benito Jones MD     WBC count (8/16/2019) = 17.9             Hypertensive heart and kidney disease without heart failure and with stage 2 chronic kidney disease (Chronic) ICD-10-CM: I13.10, N18.2  ICD-9-CM: 404.90, 585.2  Unknown Yes        Chronic hypokalemia ICD-10-CM: E87.6  ICD-9-CM: 276.8  Unknown Yes    Overview Addendum 8/15/2019 11:31 AM by Benito Jones MD     Persistent chronic hypokalemia + hypertension; ?primary hyperaldosteronism             Type 2 diabetes mellitus with stage 2 chronic kidney disease (HCC) (Chronic) ICD-10-CM: E11.22, N18.2  ICD-9-CM: 250.40, 585.2  Unknown Yes    Overview Signed 8/15/2019 12:26 PM by Tramaine Card MD     HbA1c (8/13/2019) = 6.6             Pure hypercholesterolemia (Chronic) ICD-10-CM: E78.00  ICD-9-CM: 272.0  8/15/2019 Yes    Overview Addendum 8/15/2019  6:27 PM by Tramaine Card MD     Lipid profile (8/13/2019) showed , , HDL 42, ; Lipid profile (8/14/2019) showed , , HDL 39, LDL 94             Current use of aspirin ICD-10-CM: Z79.82  ICD-9-CM: V58.66  8/14/2019 Yes        On statin therapy due to risk of future cardiovascular event ICD-10-CM: Z79.899  ICD-9-CM: V58.69  8/14/2019 Yes    Overview Signed 8/15/2019 12:21 PM by Tramaine Card MD     On Atorvastatin             Vitamin B12 deficiency anemia (Chronic) ICD-10-CM: D51.9  ICD-9-CM: 281.1  8/14/2019 Yes    Overview Signed 8/19/2019  1:24 PM by Tramaine Card MD     Vitamin B12 (8/14/2019) = 208             Iron deficiency anemia (Chronic) ICD-10-CM: D50.9  ICD-9-CM: 280.9  8/14/2019 Yes    Overview Signed 8/19/2019  1:25 PM by Tramaine Card MD     Anemia work-up (8/14/2019) showed serum iron 22, TIBC 371, iron % saturation 6, ferritin 34, reticulocyte count 1.4               Refusal of statin medication by patient ICD-10-CM: Z53.29  ICD-9-CM: V64.2  8/13/2019 Yes    Overview Signed 8/15/2019  2:01 PM by Tramaine Card MD     As per note of Neurology (Dr. Darrion Mobley), patient refuses statin agent because of potential side effects.               * (Principal) Acute ischemic stroke [facility]) ICD-10-CM: I63.9  ICD-9-CM: 434.91  8/12/2019 Yes    Overview Signed 8/15/2019 12:17 AM by Tramaine Card MD     Acute Ischemic Stroke (acute/early subacute infarct at the left posterior basal ganglia to corona radiata) with residual right hemiparesis, dysphagia and dysarthria             Impaired mobility and ADLs ICD-10-CM: Z74.09  ICD-9-CM: 799.89  8/12/2019 Yes        Received intravenous tissue plasminogen activator (tPA) in emergency department ICD-10-CM: Z92.82  ICD-9-CM: V45.88  8/12/2019 Yes        Right hemiparesis ([address] ICD-10-CM: G81.91  ICD-9-CM: 342.90  8/12/2019 Yes        Dysphagia ICD-10-CM: R13.10  ICD-9-CM: 787.20  8/12/2019 Yes        Dysarthria ICD-10-CM: R47.1  ICD-9-CM: 784.51  8/12/2019 Yes              Background:   Past Medical History:   Past Medical History:   Diagnosis Date    Acute ischemic stroke ([address] 8/12/2019    Acute Ischemic Stroke (acute/early subacute infarct at the left posterior basal ganglia to corona radiata) with residual right hemiparesis, dysphagia and dysarthria    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     On Allopurinol    Chronic hypokalemia     Persistent chronic hypokalemia + hypertension; ?primary hyperaldosteronism    CKD (chronic kidney disease) stage 2, GFR 60-89 ml/min 8/15/2019    Current use of aspirin 8/14/2019    Dysarthria 8/12/2019    Dysphagia 8/12/2019    Elevated prostate specific antigen (PSA) 7/21/2011    First degree atrioventricular block by electrocardiogram 8/12/2019    Hypertensive heart and kidney disease without heart failure and with stage 2 chronic kidney disease     Iron deficiency anemia 8/14/2019    Anemia work-up (8/14/2019) showed serum iron 22, TIBC 371, iron % saturation 6, ferritin 34, reticulocyte count 1.4     Obesity, Class I, BMI 30-34.9     Obstructive sleep apnea on CPAP     On statin therapy due to risk of future cardiovascular event 8/14/2019    On Atorvastatin    Primary osteoarthritis of right ankle 8/22/2019    X-ray of the right ankle (8/22/2019) showed no acute fracture or subluxation; advanced degenerative changes at the tibiotalar joint; old fracture fragment vs. accessory ossicle in the inferior aspect of the lateral malleolus; soft tissue swelling around the ankle.     Pure hypercholesterolemia 08/15/2019    Lipid profile (8/13/2019) showed , , HDL 42, ; Lipid profile (8/14/2019) showed , , HDL 39, LDL 94  Received intravenous tissue plasminogen activator (tPA) in emergency department 8/12/2019    Refusal of statin medication by patient 8/13/2019    As per note of Neurology (Dr. Anna Quan), patient refuses statin agent because of potential side effects.  Right hemiparesis ([facility] [address] 8/12/2019    Secondary hyperparathyroidism of renal origin ([facility] [address] 8/18/2019    PTH (8/18/2019) = 101.7    Type 2 diabetes mellitus with stage 2 chronic kidney disease (HCC)     HbA1c (8/13/2019) = 6.6    Vitamin B12 deficiency anemia 8/14/2019    Vitamin B12 (8/14/2019) = 208    Vitamin D deficiency 8/19/2019    Vitamin D 25-Hydroxy (8/19/2019) = 16.2       Patient taking anticoagulants yes Heparin, ASA   Patient has a defibrillator: no     Assessment:   Changes in Assessment throughout shift: No     Patient has central line: no Reasons if yes: Insertion date: Last dressing date:   Patient has Boykin Cath: no Reasons if yes:     Insertion date:     Last Vitals:     Vitals:    09/03/19 1435 09/03/19 1623 09/03/19 2122 09/03/19 2206   BP: 152/74 143/69 149/66 149/66   Pulse: 68 74 81 81   Resp:  18  18   Temp:  98.2 °F (36.8 °C)  98 °F (36.7 °C)   SpO2:  100%  96%   Weight:       Height:            PAIN    Pain Assessment    Pain Intensity 1: 0 (09/03/19 2000) Pain Intensity 1: 2 (12/29/14 1105)    Pain Location 1: Shoulder Pain Location 1: Abdomen    Pain Intervention(s) 1: Medication (see MAR) Pain Intervention(s) 1: Medication (see MAR)  Patient Stated Pain Goal: 0 Patient Stated Pain Goal: 0  o Intervention effective: no    o Other actions taken for pain:      Skin Assessment  Skin color Skin Color: Appropriate for ethnicity  Condition/Temperature Skin Condition/Temp: Dry, Warm  Integrity Skin Integrity: Intact  Turgor Turgor: Non-tenting  Weekly Pressure Ulcer Documentation  Pressure  Injury Documentation: No Pressure Injury Noted-Pressure Ulcer Prevention Initiated  Wound Prevention & Protection Methods  Orientation of wound Orientation of Wound Prevention: Posterior  Location of Prevention Location of Wound Prevention: Buttocks, Sacrum/Coccyx  Dressing Present Dressing Present : Yes  Dressing Status Dressing Status: Intact  Wound Offloading Wound Offloading (Prevention Methods): Bed, pressure redistribution/air     INTAKE/OUPUT  Date 09/02/19 1900 - 09/03/19 0659 09/03/19 0700 - 09/04/19 0659   Shift 8151-8033 24 Hour Total 4572-2017 6372-9522 24 Hour Total   INTAKE   P.O.  480 1560  1560     P. O.  480 1560  1560   Shift Total(mL/kg)  480(4.7) 1560(15.3)  0968(44.9)   OUTPUT   Urine(mL/kg/hr) 1925 1925 150(0.1) 525 675     Urine Voided 1925 1925 150 525 675     Urine Occurrence(s) 12 x 13 x 5 x 3 x 8 x   Stool          Stool Occurrence(s) 1 x 2 x 2 x 0 x 2 x   Shift Total(mL/kg) 1925(18.9) 1925(18.9) 150(1.5) 525(5.1) 675(6.6)   NET -1925 -1445 1410 -525 885   Weight (kg) 102 102 102 102 102       Recommendations:  1. Patient needs and requests:     2. Diet: Cardiac Mech Soft Nectar Thick     3. Pending tests/procedures:      4. Functional Level/Equipment:     5. Estimated Discharge Date: 8/24/19 Posted on Whiteboard in Patients Room: yes     HEALS Safety Check    A safety check occurred in the patient's room between off going nurse and oncoming nurse listed above.     The safety check included the below items  Area Items   H  High Alert Medications - Verify all high alert medication drips (heparin, PCA, etc.)   E  Equipment - Suction is set up for ALL patients (with yanker)  - Red plugs utilized for all equipment (IV pumps, etc.)  - WOWs wiped down at end of shift.  - Room stocked with oxygen, suction, and other unit-specific supplies   A  Alarms - Bed alarm is set for fall risk patients  - Ensure chair alarm is in place and activated if patient is up in a chair   L  Lines - Check IV for any infiltration  - Boykin bag is empty if patient has a Boykin   - Tubing and IV bags are labeled   S  Safety   - Room is clean, patient is clean, and equipment is clean. - Hallways are clear from equipment besides carts. - Fall bracelet on for fall risk patients  - Ensure room is clear and free of clutter  - Suction is set up for ALL patients (with diann)  - Hallways are clear from equipment besides carts.    - Isolation precautions followed, supplies available outside room, sign posted

## 2019-09-04 NOTE — ROUTINE PROCESS
SHIFT CHANGE NOTE FOR [facility]    Bedside and Verbal shift change report given to Skyler Blount LPN (oncoming nurse) by Long Byrd RN (offgoing nurse). Report included the following information SBAR, Kardex, MAR and Recent Results. Situation:   Code Status: Full Code   Reason for Admission: CVA  Hospital Day: 20   Problem List:   Hospital Problems  Date Reviewed: 9/4/2019          Codes Class Noted POA    Primary osteoarthritis of right ankle (Chronic) ICD-10-CM: M19.071  ICD-9-CM: 715.17  8/22/2019 Yes    Overview Signed 8/23/2019 11:54 AM by Peyman Car MD     X-ray of the right ankle (8/22/2019) showed no acute fracture or subluxation; advanced degenerative changes at the tibiotalar joint; old fracture fragment vs. accessory ossicle in the inferior aspect of the lateral malleolus; soft tissue swelling around the ankle.              Vitamin D deficiency (Chronic) ICD-10-CM: E55.9  ICD-9-CM: 268.9  8/19/2019 Yes    Overview Signed 8/19/2019  1:34 PM by Peyman Car MD     Vitamin D 25-Hydroxy (8/19/2019) = 16.2              Secondary hyperparathyroidism of renal origin ([facility] [address] (Chronic) ICD-10-CM: N25.81  ICD-9-CM: 588.81  8/18/2019 Yes    Overview Signed 8/28/2019  1:24 PM by Peyman Car MD     PTH (8/18/2019) = 101.7             Leukocytosis ICD-10-CM: D30.145  ICD-9-CM: 288.60  8/16/2019 Yes    Overview Signed 8/16/2019 11:19 AM by Peyman Car MD     WBC count (8/16/2019) = 17.9             Hypertensive heart and kidney disease without heart failure and with stage 2 chronic kidney disease (Chronic) ICD-10-CM: I13.10, N18.2  ICD-9-CM: 404.90, 585.2  Unknown Yes        Chronic hypokalemia ICD-10-CM: E87.6  ICD-9-CM: 276.8  Unknown Yes    Overview Addendum 8/15/2019 11:31 AM by Peyman Car MD     Persistent chronic hypokalemia + hypertension; ?primary hyperaldosteronism             Type 2 diabetes mellitus with stage 2 chronic kidney disease (HCC) (Chronic) ICD-10-CM: E11.22, N18.2  ICD-9-CM: 250.40, 585.2  Unknown Yes    Overview Signed 8/15/2019 12:26 PM by Elvira Mcbride MD     HbA1c (8/13/2019) = 6.6             Pure hypercholesterolemia (Chronic) ICD-10-CM: E78.00  ICD-9-CM: 272.0  8/15/2019 Yes    Overview Addendum 8/15/2019  6:27 PM by Elvira Mcbride MD     Lipid profile (8/13/2019) showed , , HDL 42, ; Lipid profile (8/14/2019) showed , , HDL 39, LDL 94             Current use of aspirin ICD-10-CM: Z79.82  ICD-9-CM: V58.66  8/14/2019 Yes        On statin therapy due to risk of future cardiovascular event ICD-10-CM: Z79.899  ICD-9-CM: V58.69  8/14/2019 Yes    Overview Signed 8/15/2019 12:21 PM by Elvira Mcbride MD     On Atorvastatin             Vitamin B12 deficiency anemia (Chronic) ICD-10-CM: D51.9  ICD-9-CM: 281.1  8/14/2019 Yes    Overview Signed 8/19/2019  1:24 PM by Elvira Mcbride MD     Vitamin B12 (8/14/2019) = 208             Iron deficiency anemia (Chronic) ICD-10-CM: D50.9  ICD-9-CM: 280.9  8/14/2019 Yes    Overview Signed 8/19/2019  1:25 PM by Elvira Mcbride MD     Anemia work-up (8/14/2019) showed serum iron 22, TIBC 371, iron % saturation 6, ferritin 34, reticulocyte count 1.4               Refusal of statin medication by patient ICD-10-CM: Z53.29  ICD-9-CM: V64.2  8/13/2019 Yes    Overview Signed 8/15/2019  2:01 PM by Elvira Mcbride MD     As per note of Neurology (Dr. Patricia Johnson), patient refuses statin agent because of potential side effects.               * (Principal) Acute ischemic stroke [facility]) ICD-10-CM: I63.9  ICD-9-CM: 434.91  8/12/2019 Yes    Overview Signed 8/15/2019 12:17 AM by Elvira Mcbride MD     Acute Ischemic Stroke (acute/early subacute infarct at the left posterior basal ganglia to corona radiata) with residual right hemiparesis, dysphagia and dysarthria             Impaired mobility and ADLs ICD-10-CM: Z74.09  ICD-9-CM: 799.89  8/12/2019 Yes        Received intravenous tissue plasminogen activator (tPA) in emergency department ICD-10-CM: Z92.82  ICD-9-CM: V45.88  8/12/2019 Yes        Right hemiparesis ([facility] [address] ICD-10-CM: G81.91  ICD-9-CM: 342.90  8/12/2019 Yes        Dysphagia ICD-10-CM: R13.10  ICD-9-CM: 787.20  8/12/2019 Yes        Dysarthria ICD-10-CM: R47.1  ICD-9-CM: 784.51  8/12/2019 Yes              Background:   Past Medical History:   Past Medical History:   Diagnosis Date    Acute ischemic stroke ([facility] [address] 8/12/2019    Acute Ischemic Stroke (acute/early subacute infarct at the left posterior basal ganglia to corona radiata) with residual right hemiparesis, dysphagia and dysarthria    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     On Allopurinol    Chronic hypokalemia     Persistent chronic hypokalemia + hypertension; ?primary hyperaldosteronism    CKD (chronic kidney disease) stage 2, GFR 60-89 ml/min 8/15/2019    Current use of aspirin 8/14/2019    Dysarthria 8/12/2019    Dysphagia 8/12/2019    Elevated prostate specific antigen (PSA) 7/21/2011    First degree atrioventricular block by electrocardiogram 8/12/2019    Hypertensive heart and kidney disease without heart failure and with stage 2 chronic kidney disease     Iron deficiency anemia 8/14/2019    Anemia work-up (8/14/2019) showed serum iron 22, TIBC 371, iron % saturation 6, ferritin 34, reticulocyte count 1.4     Obesity, Class I, BMI 30-34.9     Obstructive sleep apnea on CPAP     On statin therapy due to risk of future cardiovascular event 8/14/2019    On Atorvastatin    Primary osteoarthritis of right ankle 8/22/2019    X-ray of the right ankle (8/22/2019) showed no acute fracture or subluxation; advanced degenerative changes at the tibiotalar joint; old fracture fragment vs. accessory ossicle in the inferior aspect of the lateral malleolus; soft tissue swelling around the ankle.     Pure hypercholesterolemia 08/15/2019    Lipid profile (8/13/2019) showed , , HDL 42, ; Lipid profile (8/14/2019) showed , , HDL 39, LDL 94  Received intravenous tissue plasminogen activator (tPA) in emergency department 8/12/2019    Refusal of statin medication by patient 8/13/2019    As per note of Neurology (Dr. Endy Forbes), patient refuses statin agent because of potential side effects.  Right hemiparesis ([facility] [address] 8/12/2019    Secondary hyperparathyroidism of renal origin ([facility] [address] 8/18/2019    PTH (8/18/2019) = 101.7    Type 2 diabetes mellitus with stage 2 chronic kidney disease (HCC)     HbA1c (8/13/2019) = 6.6    Vitamin B12 deficiency anemia 8/14/2019    Vitamin B12 (8/14/2019) = 208    Vitamin D deficiency 8/19/2019    Vitamin D 25-Hydroxy (8/19/2019) = 16.2       Patient taking anticoagulants yes Heparin, ASA   Patient has a defibrillator: no     Assessment:   Changes in Assessment throughout shift: No     Patient has central line: no Reasons if yes: Insertion date: Last dressing date:   Patient has [place] Cath: no Reasons if yes:     Insertion date:     Last Vitals:     Vitals:    09/04/19 0605 09/04/19 0737 09/04/19 1346 09/04/19 1700   BP: 161/68 143/63 144/63 152/70   Pulse: 62 (!) 52 64 64   Resp:  19 19   Temp:  97.1 °F (36.2 °C)  98.9 °F (37.2 °C)   SpO2:  97%  98%   Weight:       Height:            PAIN    Pain Assessment    Pain Intensity 1: 0 (09/04/19 1625) Pain Intensity 1: 2 (12/29/14 1105)    Pain Location 1: Shoulder Pain Location 1: Abdomen    Pain Intervention(s) 1: Medication (see MAR) Pain Intervention(s) 1: Medication (see MAR)  Patient Stated Pain Goal: 0 Patient Stated Pain Goal: 0  o Intervention effective: no    o Other actions taken for pain:      Skin Assessment  Skin color Skin Color: Appropriate for ethnicity  Condition/Temperature Skin Condition/Temp: Dry, Warm  Integrity Skin Integrity: Intact  Turgor Turgor: Non-tenting  Weekly Pressure Ulcer Documentation  Pressure  Injury Documentation: No Pressure Injury Noted-Pressure Ulcer Prevention Initiated  Wound Prevention & Protection Methods  Orientation of wound Orientation of Wound Prevention: Posterior  Location of Prevention Location of Wound Prevention: Sacrum/Coccyx  Dressing Present Dressing Present : Yes  Dressing Status Dressing Status: Intact  Wound Offloading Wound Offloading (Prevention Methods): Bed, pressure reduction mattress     INTAKE/OUPUT  Date 09/03/19 1900 - 09/04/19 0659 09/04/19 0700 - 09/05/19 0659   Shift 3971-5729 24 Hour Total 3271-0536 9815-4925 24 Hour Total   INTAKE   P.O.  1560 960  960     P.O.  1560 960  960   Shift Total(mL/kg)  1560(15.3) 960(9.4)  960(9.4)   OUTPUT   Urine(mL/kg/hr) 700 850        Urine Voided 700 850        Urine Occurrence(s) 5 x 10 x 3 x 1 x 4 x   Stool 250 250        Stool Occurrence(s) 0 x 2 x 0 x 1 x 1 x     Stool 250 250      Shift Total(mL/kg) 950(9.3) 1100(10.8)      NET -950 460 960  960   Weight (kg) 102 102 102 102 102       Recommendations:  1. Patient needs and requests:     2. Diet: Cardiac Mech Soft Nectar Thick     3. Pending tests/procedures:      4. Functional Level/Equipment:     5. Estimated Discharge Date: 8/24/19 Posted on Whiteboard in Patients Room: yes     HEALS Safety Check    A safety check occurred in the patient's room between off going nurse and oncoming nurse listed above.     The safety check included the below items  Area Items   H  High Alert Medications - Verify all high alert medication drips (heparin, PCA, etc.)   E  Equipment - Suction is set up for ALL patients (with yanker)  - Red plugs utilized for all equipment (IV pumps, etc.)  - WOWs wiped down at end of shift.  - Room stocked with oxygen, suction, and other unit-specific supplies   A  Alarms - Bed alarm is set for fall risk patients  - Ensure chair alarm is in place and activated if patient is up in a chair   L  Lines - Check IV for any infiltration  - Boykin bag is empty if patient has a Boykin   - Tubing and IV bags are labeled   S  Safety   - Room is clean, patient is clean, and equipment is clean.  - Hallways are clear from equipment besides carts. - Fall bracelet on for fall risk patients  - Ensure room is clear and free of clutter  - Suction is set up for ALL patients (with diann)  - Hallways are clear from equipment besides carts.    - Isolation precautions followed, supplies available outside room, sign posted

## 2019-09-04 NOTE — PROGRESS NOTES
[x] Psychology  [] Social Work [] Recreational Therapy    INTERVENTION  UNITS/TIME OF SERVICE   Assessment    Supportive Counseling September 4, 2019   Orientation    Discharge Planning    Resource Linkage              Progress/Current Status    Patient seen for individual support  on ARU on above referenced date. Patient is already aware that he may transfer to SNF on discharge one day sooner than initially scheduled; and, he seems to have no problem with this change nor his expected transfer. In fact, he is hopeful that he will make continued gains in next therapy program and be able to return to home, at a later date. Patient is presenting with stable mood and no apparent emotional distress. No lability is evident. While he tries to emphasize positive thinking and outcome for himself, he also acknowledges forced changes secondary to stroke and how \"unexpected\" these changes have been for him and his wife. Patient feels well supported by her, however, and knows that she will maintain her support post hospital.  Patient encouraged to further identify realistic expectations for himself in order to avoid becoming frustrated or even depressed.   Finally, patient has been observed very diligent in practicing oral exercises for himself, as per Los Rodgers, PHD 9/4/2019 11:30 AM

## 2019-09-04 NOTE — ROUTINE PROCESS
SHIFT CHANGE NOTE FOR [facility]    Bedside and Verbal shift change report given to Ursula Yates RN (oncoming nurse) by Jorge Olson RN (offgoing nurse). Report included the following information SBAR, Kardex, MAR and Recent Results. Situation:   Code Status: Full Code   Reason for Admission: CVA  Hospital Day: 19   Problem List:   Hospital Problems  Date Reviewed: 9/3/2019          Codes Class Noted POA    Primary osteoarthritis of right ankle (Chronic) ICD-10-CM: M19.071  ICD-9-CM: 715.17  8/22/2019 Yes    Overview Signed 8/23/2019 11:54 AM by Fozia Wbeb MD     X-ray of the right ankle (8/22/2019) showed no acute fracture or subluxation; advanced degenerative changes at the tibiotalar joint; old fracture fragment vs. accessory ossicle in the inferior aspect of the lateral malleolus; soft tissue swelling around the ankle.              Vitamin D deficiency (Chronic) ICD-10-CM: E55.9  ICD-9-CM: 268.9  8/19/2019 Yes    Overview Signed 8/19/2019  1:34 PM by Fozia Webb MD     Vitamin D 25-Hydroxy (8/19/2019) = 16.2              Secondary hyperparathyroidism of renal origin ([facility] [address] (Chronic) ICD-10-CM: N25.81  ICD-9-CM: 588.81  8/18/2019 Yes    Overview Signed 8/28/2019  1:24 PM by Fozia Webb MD     PTH (8/18/2019) = 101.7             Leukocytosis ICD-10-CM: D07.058  ICD-9-CM: 288.60  8/16/2019 Yes    Overview Signed 8/16/2019 11:19 AM by Fozia Webb MD     WBC count (8/16/2019) = 17.9             Hypertensive heart and kidney disease without heart failure and with stage 2 chronic kidney disease (Chronic) ICD-10-CM: I13.10, N18.2  ICD-9-CM: 404.90, 585.2  Unknown Yes        Chronic hypokalemia ICD-10-CM: E87.6  ICD-9-CM: 276.8  Unknown Yes    Overview Addendum 8/15/2019 11:31 AM by Fozia Webb MD     Persistent chronic hypokalemia + hypertension; ?primary hyperaldosteronism             Type 2 diabetes mellitus with stage 2 chronic kidney disease (HCC) (Chronic) ICD-10-CM: E11.22, N18.2  ICD-9-CM: 250.40, 585.2  Unknown Yes    Overview Signed 8/15/2019 12:26 PM by Ami Cloe MD     HbA1c (8/13/2019) = 6.6             Pure hypercholesterolemia (Chronic) ICD-10-CM: E78.00  ICD-9-CM: 272.0  8/15/2019 Yes    Overview Addendum 8/15/2019  6:27 PM by Ami Cole MD     Lipid profile (8/13/2019) showed , , HDL 42, ; Lipid profile (8/14/2019) showed , , HDL 39, LDL 94             Current use of aspirin ICD-10-CM: Z79.82  ICD-9-CM: V58.66  8/14/2019 Yes        On statin therapy due to risk of future cardiovascular event ICD-10-CM: Z79.899  ICD-9-CM: V58.69  8/14/2019 Yes    Overview Signed 8/15/2019 12:21 PM by Ami Cole MD     On Atorvastatin             Vitamin B12 deficiency anemia (Chronic) ICD-10-CM: D51.9  ICD-9-CM: 281.1  8/14/2019 Yes    Overview Signed 8/19/2019  1:24 PM by Ami Cole MD     Vitamin B12 (8/14/2019) = 208             Iron deficiency anemia (Chronic) ICD-10-CM: D50.9  ICD-9-CM: 280.9  8/14/2019 Yes    Overview Signed 8/19/2019  1:25 PM by Ami Cole MD     Anemia work-up (8/14/2019) showed serum iron 22, TIBC 371, iron % saturation 6, ferritin 34, reticulocyte count 1.4               Refusal of statin medication by patient ICD-10-CM: Z53.29  ICD-9-CM: V64.2  8/13/2019 Yes    Overview Signed 8/15/2019  2:01 PM by Ami Cole MD     As per note of Neurology (Dr. Lonnie Reed), patient refuses statin agent because of potential side effects.               * (Principal) Acute ischemic stroke [facility]) ICD-10-CM: I63.9  ICD-9-CM: 434.91  8/12/2019 Yes    Overview Signed 8/15/2019 12:17 AM by Ami Cole MD     Acute Ischemic Stroke (acute/early subacute infarct at the left posterior basal ganglia to corona radiata) with residual right hemiparesis, dysphagia and dysarthria             Impaired mobility and ADLs ICD-10-CM: Z74.09  ICD-9-CM: 799.89  8/12/2019 Yes        Received intravenous tissue plasminogen activator (tPA) in emergency department ICD-10-CM: Z92.82  ICD-9-CM: V45.88  8/12/2019 Yes        Right hemiparesis ([facility] [address] ICD-10-CM: G81.91  ICD-9-CM: 342.90  8/12/2019 Yes        Dysphagia ICD-10-CM: R13.10  ICD-9-CM: 787.20  8/12/2019 Yes        Dysarthria ICD-10-CM: R47.1  ICD-9-CM: 784.51  8/12/2019 Yes              Background:   Past Medical History:   Past Medical History:   Diagnosis Date    Acute ischemic stroke ([facility] [address] 8/12/2019    Acute Ischemic Stroke (acute/early subacute infarct at the left posterior basal ganglia to corona radiata) with residual right hemiparesis, dysphagia and dysarthria    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     On Allopurinol    Chronic hypokalemia     Persistent chronic hypokalemia + hypertension; ?primary hyperaldosteronism    CKD (chronic kidney disease) stage 2, GFR 60-89 ml/min 8/15/2019    Current use of aspirin 8/14/2019    Dysarthria 8/12/2019    Dysphagia 8/12/2019    Elevated prostate specific antigen (PSA) 7/21/2011    First degree atrioventricular block by electrocardiogram 8/12/2019    Hypertensive heart and kidney disease without heart failure and with stage 2 chronic kidney disease     Iron deficiency anemia 8/14/2019    Anemia work-up (8/14/2019) showed serum iron 22, TIBC 371, iron % saturation 6, ferritin 34, reticulocyte count 1.4     Obesity, Class I, BMI 30-34.9     Obstructive sleep apnea on CPAP     On statin therapy due to risk of future cardiovascular event 8/14/2019    On Atorvastatin    Primary osteoarthritis of right ankle 8/22/2019    X-ray of the right ankle (8/22/2019) showed no acute fracture or subluxation; advanced degenerative changes at the tibiotalar joint; old fracture fragment vs. accessory ossicle in the inferior aspect of the lateral malleolus; soft tissue swelling around the ankle.     Pure hypercholesterolemia 08/15/2019    Lipid profile (8/13/2019) showed , , HDL 42, ; Lipid profile (8/14/2019) showed , TC 173, HDL 39, LDL 94    Received intravenous tissue plasminogen activator (tPA) in emergency department 8/12/2019    Refusal of statin medication by patient 8/13/2019    As per note of Neurology (Dr. Angelito Cruz), patient refuses statin agent because of potential side effects.  Right hemiparesis ([facility] [address] 8/12/2019    Secondary hyperparathyroidism of renal origin ([facility] [address] 8/18/2019    PTH (8/18/2019) = 101.7    Type 2 diabetes mellitus with stage 2 chronic kidney disease (HCC)     HbA1c (8/13/2019) = 6.6    Vitamin B12 deficiency anemia 8/14/2019    Vitamin B12 (8/14/2019) = 208    Vitamin D deficiency 8/19/2019    Vitamin D 25-Hydroxy (8/19/2019) = 16.2       Patient taking anticoagulants yes Heparin, ASA   Patient has a defibrillator: no     Assessment:   Changes in Assessment throughout shift: No     Patient has central line: no Reasons if yes: Insertion date: Last dressing date:   Patient has Boykin Cath: no Reasons if yes:     Insertion date:     Last Vitals:     Vitals:    09/03/19 0554 09/03/19 0754 09/03/19 1435 09/03/19 1623   BP: 178/78 126/67 152/74 143/69   Pulse: 61 (!) 58 68 74   Resp:  18  18   Temp:  97.8 °F (36.6 °C)  98.2 °F (36.8 °C)   SpO2:  97%  100%   Weight:       Height:            PAIN    Pain Assessment    Pain Intensity 1: 0 (09/03/19 1623) Pain Intensity 1: 2 (12/29/14 1105)    Pain Location 1: Shoulder Pain Location 1: Abdomen    Pain Intervention(s) 1: Medication (see MAR) Pain Intervention(s) 1: Medication (see MAR)  Patient Stated Pain Goal: 0 Patient Stated Pain Goal: 0  o Intervention effective: no    o Other actions taken for pain:      Skin Assessment  Skin color Skin Color: Appropriate for ethnicity  Condition/Temperature Skin Condition/Temp: Dry, Warm  Integrity Skin Integrity: Intact  Turgor Turgor: Non-tenting  Weekly Pressure Ulcer Documentation  Pressure  Injury Documentation: No Pressure Injury Noted-Pressure Ulcer Prevention Initiated  Wound Prevention & Protection Methods  Orientation of wound Orientation of Wound Prevention: Posterior  Location of Prevention Location of Wound Prevention: Buttocks, Sacrum/Coccyx  Dressing Present Dressing Present : No  Dressing Status Dressing Status: Intact  Wound Offloading Wound Offloading (Prevention Methods): Bed, pressure redistribution/air     INTAKE/OUPUT  Date 09/02/19 1900 - 09/03/19 0659 09/03/19 0700 - 09/04/19 0659   Shift 6027-5444 24 Hour Total 1571-0595 7038-5988 24 Hour Total   INTAKE   P.O.  480 1560  1560     P. O.  480 1560  1560   Shift Total(mL/kg)  480(4.7) 1560(15.3)  0847(49.7)   OUTPUT   Urine(mL/kg/hr) 1925 1925 150(0.1)  150     Urine Voided 1925 1925 150  150     Urine Occurrence(s) 12 x 13 x 5 x  5 x   Stool          Stool Occurrence(s) 1 x 2 x 2 x  2 x   Shift Total(mL/kg) 1925(18.9) 1925(18.9) 150(1.5)  150(1.5)   NET -1925 -1445 1410  1410   Weight (kg) 102 102 102 102 102       Recommendations:  1. Patient needs and requests:     2. Diet: Cardiac Mech Soft Nectar Thick     3. Pending tests/procedures:      4. Functional Level/Equipment:     5. Estimated Discharge Date: 8/24/19 Posted on Whiteboard in Patients Room: yes     HEALS Safety Check    A safety check occurred in the patient's room between off going nurse and oncoming nurse listed above.     The safety check included the below items  Area Items   H  High Alert Medications - Verify all high alert medication drips (heparin, PCA, etc.)   E  Equipment - Suction is set up for ALL patients (with yanker)  - Red plugs utilized for all equipment (IV pumps, etc.)  - WOWs wiped down at end of shift.  - Room stocked with oxygen, suction, and other unit-specific supplies   A  Alarms - Bed alarm is set for fall risk patients  - Ensure chair alarm is in place and activated if patient is up in a chair   L  Lines - Check IV for any infiltration  - Boykin bag is empty if patient has a Boykin   - Tubing and IV bags are labeled   S  Safety   - Room is clean, patient is clean, and equipment is clean. - Hallways are clear from equipment besides carts. - Fall bracelet on for fall risk patients  - Ensure room is clear and free of clutter  - Suction is set up for ALL patients (with diann)  - Hallways are clear from equipment besides carts.    - Isolation precautions followed, supplies available outside room, sign posted

## 2019-09-04 NOTE — PROGRESS NOTES
Problem: Diabetes Self-Management  Goal: *Disease process and treatment process  Description  Define diabetes and identify own type of diabetes; list 3 options for treating diabetes. Outcome: Progressing Towards Goal  Goal: *Incorporating nutritional management into lifestyle  Description  Describe effect of type, amount and timing of food on blood glucose; list 3 methods for planning meals. Outcome: Progressing Towards Goal  Goal: *Incorporating physical activity into lifestyle  Description  State effect of exercise on blood glucose levels. Outcome: Progressing Towards Goal  Goal: *Developing strategies to promote health/change behavior  Description  Define the ABC's of diabetes; identify appropriate screenings, schedule and personal plan for screenings. Outcome: Progressing Towards Goal  Goal: *Using medications safely  Description  State effect of diabetes medications on diabetes; name diabetes medication taking, action and side effects. Outcome: Progressing Towards Goal  Goal: *Monitoring blood glucose, interpreting and using results  Description  Identify recommended blood glucose targets  and personal targets. Outcome: Progressing Towards Goal  Goal: *Prevention, detection, treatment of acute complications  Description  List symptoms of hyper- and hypoglycemia; describe how to treat low blood sugar and actions for lowering  high blood glucose level. Outcome: Progressing Towards Goal  Goal: *Prevention, detection and treatment of chronic complications  Description  Define the natural course of diabetes and describe the relationship of blood glucose levels to long term complications of diabetes.   Outcome: Progressing Towards Goal  Goal: *Developing strategies to address psychosocial issues  Description  Describe feelings about living with diabetes; identify support needed and support network  Outcome: Progressing Towards Goal  Goal: *Insulin pump training  Outcome: Progressing Towards Goal  Goal: *Sick day guidelines  Outcome: Progressing Towards Goal  Goal: *Patient Specific Goal (EDIT GOAL, INSERT TEXT)  Outcome: Progressing Towards Goal     Problem: Falls - Risk of  Goal: *Absence of Falls  Description  Document Radha Cook Fall Risk and appropriate interventions in the flowsheet. Outcome: Progressing Towards Goal  Note:   Fall Risk Interventions:  Mobility Interventions: Bed/chair exit alarm    Mentation Interventions: Bed/chair exit alarm, Evaluate medications/consider consulting pharmacy    Medication Interventions: Bed/chair exit alarm, Evaluate medications/consider consulting pharmacy, Patient to call before getting OOB    Elimination Interventions: Call light in reach, Bed/chair exit alarm, Patient to call for help with toileting needs    History of Falls Interventions: Bed/chair exit alarm, Evaluate medications/consider consulting pharmacy         Problem: Patient Education: Go to Patient Education Activity  Goal: Patient/Family Education  Outcome: Progressing Towards Goal     Problem: Pressure Injury - Risk of  Goal: *Prevention of pressure injury  Description  Document Bartolome Scale and appropriate interventions in the flowsheet.   Outcome: Progressing Towards Goal  Note:   Pressure Injury Interventions:  Sensory Interventions: Assess changes in LOC, Chair cushion    Moisture Interventions: Absorbent underpads    Activity Interventions: Chair cushion, Increase time out of bed, Pressure redistribution bed/mattress(bed type)    Mobility Interventions: Chair cushion, HOB 30 degrees or less, Pressure redistribution bed/mattress (bed type)    Nutrition Interventions: Document food/fluid/supplement intake    Friction and Shear Interventions: HOB 30 degrees or less                Problem: Patient Education: Go to Patient Education Activity  Goal: Patient/Family Education  Outcome: Progressing Towards Goal     Problem: Pain  Goal: *Control of Pain  Outcome: Progressing Towards Goal  Goal: *PALLIATIVE CARE: Alleviation of Pain  Outcome: Progressing Towards Goal     Problem: Patient Education: Go to Patient Education Activity  Goal: Patient/Family Education  Outcome: Progressing Towards Goal     Problem: Dysphagia (Adult)  Goal: *Speech Goal: (INSERT TEXT)  Description  Long term goals  Patient will:  1. Tolerate regular diet with thin liquids using safe swallowing techniques without s/s of aspiration in 5/6 meals. 2. Use safe swallowing techniques (including slowed rate, small bites/sips, alternate liquids/solids, effortful swallow, head rotation to right for liquids) with supervision. 3. Participate in MBS study prior to advancing to thin liquids to assure safety (no aspiration). 4. Perform oral and pharyngeal strengthening exercises (to improve speech and swallowing) with supervision-modified independence. 5. Demonstrate 90% intelligibility in conversation using appropriate speaking strategies (slowed rate, overarticulation, increased volume). 6. Recall 3 words after 5 minutes with supervision. 7. Perform functional problem solving/reasoning tasks with 90% accuracy. 8. Name 8-10 items in categories of increasing abstraction, supervision. Short term goals (by 9/6/19)  Patient will:   1. Tolerate soft diet with nectar thick liquids using safe swallowing techniques without s/s of aspiration in 5/6 meals. 2. Use safe swallowing techniques (including slowed rate, small bites/sips, alternate liquids/solids, effortful swallow, head rotation to right for liquids) with min cues. 3. Tolerate small amounts of ice chips and water with the SLP using head rotation to the right and/or chin tuck, without overt s/s of aspiration. 4. Perform oral and pharyngeal strengthening exercises (to improve speech and swallowing) with supervision. 5. Demonstrate 90% intelligibility in sentences using appropriate speaking strategies (slowed rate, overarticulation, increased volume).   6. Recall 3 words after 5 minutes with min assist.  7. Perform functional problem solving/reasoning tasks with 75-85% accuracy. 8. Name 8-10 items in concrete categories, supervision.        Outcome: Progressing Towards Goal     Problem: Patient Education: Go to Patient Education Activity  Goal: Patient/Family Education  Outcome: Progressing Towards Goal     Problem: Injury - Risk of, Adverse Drug Event  Goal: *Absence of adverse drug events  Outcome: Progressing Towards Goal  Goal: *Absence of medication errors  Outcome: Progressing Towards Goal  Goal: *Knowledge of prescribed medications  Outcome: Progressing Towards Goal     Problem: Patient Education: Go to Patient Education Activity  Goal: Patient/Family Education  Outcome: Progressing Towards Goal     Problem: Inpatient Rehab (Adult)  Goal: *LTG: Avoids injury/falls 100% of time related to deficits  Outcome: Progressing Towards Goal  Goal: *LTG: Avoids infection 100% of time related to deficits  Outcome: Progressing Towards Goal  Goal: *LTG: Verbalize understanding of diagnosis and risk factors for recurring stroke  Outcome: Progressing Towards Goal  Goal: *LTG: Absence of DVT during hospitalization  Outcome: Progressing Towards Goal  Goal: *LTG: Maintains Skin Integrity With No Evidence of Pressure Injury 100% of Time  Outcome: Progressing Towards Goal  Goal: Interventions  Outcome: Progressing Towards Goal     Problem: Patient Education: Go to Patient Education Activity  Goal: Patient/Family Education  Outcome: Progressing Towards Goal

## 2019-09-04 NOTE — PROGRESS NOTES
Problem: Dysphagia (Adult)  Goal: *Speech Goal: (INSERT TEXT)  Description  Long term goals  Patient will:  1. Tolerate regular diet with thin liquids using safe swallowing techniques without s/s of aspiration in 5/6 meals. 2. Use safe swallowing techniques (including slowed rate, small bites/sips, alternate liquids/solids, effortful swallow, head rotation to right for liquids) with supervision. 3. Participate in MBS study prior to advancing to thin liquids to assure safety (no aspiration). 4. Perform oral and pharyngeal strengthening exercises (to improve speech and swallowing) with supervision-modified independence. 5. Demonstrate 90% intelligibility in conversation using appropriate speaking strategies (slowed rate, overarticulation, increased volume). 6. Recall 3 words after 5 minutes with supervision. 7. Perform functional problem solving/reasoning tasks with 90% accuracy. 8. Name 8-10 items in categories of increasing abstraction, supervision. Short term goals (by 9/6/19)  Patient will:   1. Tolerate soft diet with nectar thick liquids using safe swallowing techniques without s/s of aspiration in 5/6 meals. 2. Use safe swallowing techniques (including slowed rate, small bites/sips, alternate liquids/solids, effortful swallow, head rotation to right for liquids) with min cues. 3. Tolerate small amounts of ice chips and water with the SLP using head rotation to the right and/or chin tuck, without overt s/s of aspiration. 4. Perform oral and pharyngeal strengthening exercises (to improve speech and swallowing) with supervision. 5. Demonstrate 90% intelligibility in sentences using appropriate speaking strategies (slowed rate, overarticulation, increased volume). 6. Recall 3 words after 5 minutes with min assist.  7. Perform functional problem solving/reasoning tasks with 75-85% accuracy. 8. Name 8-10 items in concrete categories, supervision.        Note:   SPEECH LANGUAGE PATHOLOGY TREATMENT    Patient: Sudha Salazar (45 y.o. male)  Date: 9/4/2019  Diagnosis: Acute ischemic stroke [facility]) [I63.9] Acute ischemic stroke ([facility] [address]       Time in:  0840 1030 1230  Time Out:  0920 1100 1315  SUBJECTIVE:   Patient stated \"Well the trays have been here since ten after eight\" and I don't want to get what they will send. OBJECTIVE:   Mental Status:  Mr. Azael Ambrose was awake and alert in therapy. He continues to be quite impulsive. Treatment & Interventions:   Patient was seen in the dining room at mealtime for breakfast and lunch. SLP was a few minutes [place] Mr. Azael Ambrose had insisted upon receiving his tray at 8:30 and would not delay until th SLP was present. In the few minutes that he was unsupervised, he had eaten more than one third of the food on the plate. He continues to eat quite rapidly, and try to take larger bites than recommended. Patient frustrated with the SLP when he is cued to slow his rate, take smaller boluses and clear his mouth prior to adding more. His facial expression clearly communicates this frustration and anger. Mr. Azael Ambrose was also seen for a thirty minute speech therapy session this morning. The following treatment tasks were presented:   Motor Speech:   Laryngeal exercises:  Vowel prolongations: 13 second average       Pitch glides:  Improved, cues to reach lower pitches   Production of /g/ in words: 80% accuracy  Small boluses ice chips: Throat clearing after 20% of trials  Small boluses water:  Throat clearing after 30% of trials, strong cough after one bolus (5cc)      Patient needed mod cues for use of swallowing strategy-head rotation right  Neuro-Linguistics:  Orientation:   Supervision  Recent memory:  Min assist (patient unable to recall OT's name despite consistent follow up)  Discussing advantages: 75% appropriate responses      67% appropriate responses      Patient extremely concrete and unable to think from a perspective other than his own. Response & Tolerance to Activities:  Mr. Martin Fountain continues to demonstrate irritation with the SLP during meals (when cued for safe swallowing) and cognitive tasks (he is very concrete and dislikes tasks in which he is asked to think outside of his personal realm). Pain:  Pain Scale 1: Numeric (0 - 10)  No report of pain     After treatment:   ?       Patient left in no apparent distress sitting up in chair  ? Patient left in no apparent distress in bed  ? Call bell left within reach  ? Nursing notified  ? Caregiver present  ? Bed alarm activated    ASSESSMENT:   Progression toward goals:  ?       Improving appropriately and progressing toward goals  ? Improving slowly and progressing toward goals  ? Not making progress toward goals and plan of care will be adjusted    PLAN:   Patient continues to benefit from skilled intervention to address the above impairments. Continue treatment per established plan of care.   Discharge Recommendations:  [facility]    Estimated LOS: through 9/5/19    SILVERIO Dominguez  Time calculation:  115 Minutes

## 2019-09-04 NOTE — PROGRESS NOTES
[facility]  [address] [place] [address], [address]     INPATIENT REHABILITATION  DAILY PROGRESS NOTE     Date: 9/4/2019    Name: Yvonne Walton Age / Sex: 71 y.o. / male   CSN: [id number] MRN: [id number]   [facility] Date: 8/15/2019 Length of Stay: 20 days     Primary Rehab Diagnosis: Impaired Mobility and ADLs secondary to Acute Ischemic Stroke (acute/early subacute infarct at the left posterior basal ganglia to corona radiata) with residual right hemiparesis, dysphagia and dysarthria      Subjective:     Patient seen and examined. Blood pressure controlled. Blood glucose controlled. Patient's Complaint:   No significant medical complaints    Pain Control: no current joint or muscle symptoms, essentially pain-free      Objective:     Vital Signs:  Patient Vitals for the past 24 hrs:   BP Temp Pulse Resp SpO2   09/04/19 1054 -- -- 98 -- 93 %   09/04/19 1015 -- -- (!) 121 -- 91 %   09/04/19 0940 -- -- (!) 113 -- 92 %   09/04/19 0737 143/63 97.1 °F (36.2 °C) (!) 52 19 97 %   09/04/19 0605 161/68 -- 62 -- --   09/03/19 2206 149/66 98 °F (36.7 °C) 81 18 96 %   09/03/19 2122 149/66 -- 81 -- --   09/03/19 1623 143/69 98.2 °F (36.8 °C) 74 18 100 %   09/03/19 1435 152/74 -- 68 -- --        Physical Examination:  GENERAL SURVEY: Patient is awake, alert, oriented x 3, sitting comfortably on the chair, not in acute respiratory distress.   HEENT: pink palpebral conjunctivae, anicteric sclerae, no nasoaural discharge, moist oral mucosa  NECK: supple, no jugular venous distention, no palpable lymph nodes  CHEST/LUNGS: symmetrical chest expansion, good air entry, clear breath sounds  HEART: adynamic precordium, good S1 S2, no S3, regular rhythm, no murmurs  ABDOMEN: obese, bowel sounds appreciated, soft, non-tender  EXTREMITIES: pink nailbeds, no edema except for right ankle swelling, full and equal pulses, no calf tenderness   NEUROLOGICAL EXAM: The patient is awake, alert and oriented x3, able to answer questions fairly appropriately, able to follow 1 and 2 step commands. Able to tell time from the wall clock. Cranial nerves II-XII are grossly intact except for slightly shallow right nasolabial fold, dysarthria and dysphagia. No gross sensory deficit. Motor strength is 5/5 on the LUE and LLE, 0/5 on the RUE (except for 1/5 on the right shoulder), 2+/5 on RLE (except for 0/5 on the right ankle/foot).       Current Medications:  Current Facility-Administered Medications   Medication Dose Route Frequency    iron sucrose (VENOFER) 200 mg in 0.9% sodium chloride 100 mL IVPB  200 mg IntraVENous Q24H    labetalol (NORMODYNE) tablet 600 mg  600 mg Oral Q12H    hydrALAZINE (APRESOLINE) tablet 75 mg  75 mg Oral Q8H    baclofen (LIORESAL) tablet 10 mg  10 mg Oral TID    spironolactone (ALDACTONE) tablet 50 mg  50 mg Oral BID    cyanocobalamin tablet 1,000 mcg  1,000 mcg Oral DAILY    cholecalciferol (VITAMIN D3) capsule 5,000 Units  5,000 Units Oral DAILY    isosorbide dinitrate (ISORDIL) tablet 30 mg  30 mg Oral Q8H    hydrALAZINE (APRESOLINE) tablet 25 mg  25 mg Oral TID PRN    acetaminophen (TYLENOL) tablet 650 mg  650 mg Oral Q4H PRN    bisacodyl (DULCOLAX) tablet 10 mg  10 mg Oral Q48H PRN    heparin (porcine) injection 5,000 Units  5,000 Units SubCUTAneous Q8H    insulin lispro (HUMALOG) injection   SubCUTAneous TIDAC    aspirin chewable tablet 81 mg  81 mg Oral DAILY WITH BREAKFAST    amLODIPine (NORVASC) tablet 10 mg  10 mg Oral DAILY    atorvastatin (LIPITOR) tablet 80 mg  80 mg Oral QHS       Allergies:  No Known Allergies      Functional Progress:    PHYSICAL THERAPY    ON ADMISSION MOST RECENT   Wheelchair Mobility/Management  Able to Propel (ft): 300 feet  Functional Level: 5  Curbs/Ramps Assist Required (FIM Score): 0 (Not tested)  Wheelchair Setup Assist Required : 3 (Moderate assistance)(for right brake and leg rest)  Wheelchair Management: Manages left brake Wheelchair Mobility/Management  Able to Propel (ft): 300 feet  Functional Level: 5  Curbs/Ramps Assist Required (FIM Score): 0 (Not tested)  Wheelchair Setup Assist Required : 4 (Minimal assistance)(for right leg rest)  Wheelchair Management: Manages left brake, Manages right brake     Gait  Amount of Assistance: 2 (Maximal assistance)  Distance (ft): 3 Feet (ft)  Assistive Device: Cane, quad Gait  Amount of Assistance: 4 (Minimal assistance)  Distance (ft): 30 Feet (ft)  Assistive Device: Cane, quad     Balance-Sitting/Standing  Sitting - Static: Good (unsupported)  Sitting - Dynamic: Fair (occasional)  Standing - Static: (poor+)  Standing - Dynamic : Impaired Balance-Sitting/Standing  Sitting - Static: Good (unsupported)  Sitting - Dynamic: Fair (occasional)  Standing - Static: (fair-)  Standing - Dynamic : Impaired     Bed/Mat Mobility  Rolling Right : 6 (Modified independent)  Rolling Left : 3 (Moderate assistance )  Supine to Sit : 3 (Moderate assistance)  Sit to Supine : 3 (Moderate assistance) Bed/Mat Mobility  Rolling Right : 0 (Not tested)  Rolling Left : 0 (Not tested)  Supine to Sit : 0 (Not tested)  Sit to Supine : 0 (Not tested)     Transfers  Transfer Type: (stand step without assistive device)  Other: stand step txfr without AD  Transfer Assistance : 2 (Maximal assistance)  Sit to Stand Assistance: Maximum assistance  Car Transfers: Not tested Transfers  Transfer Type: Other  Other: stand step without assistive device  Transfer Assistance : 3 (Moderate assistance )  Sit to Stand Assistance:  Moderate assistance  Car Transfers: Not tested     Steps or Stairs  Steps/Stairs Ambulated (#): 0  Level of Assist : 0 (Not tested) Steps or Stairs  Steps/Stairs Ambulated (#): 0  Level of Assist : 0 (Not tested)         Lab/Data Review:  Recent Results (from the past 24 hour(s))   GLUCOSE, POC    Collection Time: 09/03/19  4:58 PM   Result Value Ref Range    Glucose (POC) 118 (H) 70 - 110 mg/dL   GLUCOSE, POC    Collection Time: 09/03/19  8:48 PM   Result Value Ref Range    Glucose (POC) 120 (H) 70 - 110 mg/dL   GLUCOSE, POC    Collection Time: 09/04/19  7:36 AM   Result Value Ref Range    Glucose (POC) 103 70 - 110 mg/dL   GLUCOSE, POC    Collection Time: 09/04/19 11:57 AM   Result Value Ref Range    Glucose (POC) 113 (H) 70 - 110 mg/dL       Estimated Glomerular Filtration Rate:  On admission, estimated GFR based on a Creatinine of 1.20 mg/dl:              Using CKD-EPI = 63.1 mL/min/1.73m2              Using MDRD = 64.6 mL/min/1.73m2  Most recent estimated GFR, based on a Creatinine of 1.69 mg/dl on 9/2/2019:   Using CKD-EPI = 41.7 mL/min/1.73m2   Using MDRD = 43.5 mL/min/1.73m2       Assessment:     Primary Rehabilitation Diagnosis  1. Impaired Mobility and ADLs  2. Acute Ischemic Stroke (acute/early subacute infarct at the left posterior basal ganglia to corona radiata) with residual right hemiparesis, dysphagia and dysarthria     Comorbidities   Obesity, Class I, BMI 30-34.9 E66.9    Obstructive sleep apnea on CPAP G47.33, Z99.89    Hypertensive heart and kidney disease without heart failure and with stage 2 chronic kidney disease I13.10, N18.2    Chronic hypokalemia E87.6    CKD (chronic kidney disease) stage 2, GFR 60-89 ml/min N18.2    Current use of aspirin Z79.82    On statin therapy due to risk of future cardiovascular event Z79.899    Type 2 diabetes mellitus with stage 2 chronic kidney disease  E11.22, N18.2    Pure hypercholesterolemia E78.00    Elevated prostate specific antigen (PSA) R97.20    Refusal of statin medication by patient Z48.34    First degree atrioventricular block by electrocardiogram I44.0    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 20X0    Leukocytosis D72.829    Iron deficiency anemia D50.9    Vitamin B12 anemia D51.9    Vitamin D deficiency E55.9    Secondary hyperparathyroidism of renal origin N25.80        Plan:     1.  Justification for continued stay: Good progression towards established rehabilitation goals. 2. Medical Issues being followed closely:    [x]  Fall and safety precautions     []  Wound Care     [x]  Bowel and Bladder Function     [x]  Fluid Electrolyte and Nutrition Balance     []  Pain Control      3. Issues that 24 hour rehabilitation nursing is following:    [x]  Fall and safety precautions     []  Wound Care     [x]  Bowel and Bladder Function     [x]  Fluid Electrolyte and Nutrition Balance     []  Pain Control      [x]  Assistance with and education on in-room safety with transfers to and from the bed, wheelchair, toilet and shower. 4. Acute rehabilitation plan of care:    [x]  Continue current care and rehab. [x]  Physical Therapy           [x]  Occupational Therapy           [x]  Speech Therapy     []  Hold Rehab until further notice     5. Medications:    [x]  MAR Reviewed     [x]  Continue Present Medications     6. DVT Prophylaxis:      []  Lovenox     [x]  Unfractionated Heparin     []  Coumadin     []  NOAC     []  SUZAN Stockings     []  Sequential Compression Device     []  None     7.  Orders:   > Acute Ischemic Stroke (acute/early subacute infarct at the left posterior basal ganglia to corona radiata) with residual right hemiparesis, dysphagia and dysarthria; S/P Administration of intravenous tPA (8/12/2019 - [address])   > On 8/22/2019, started Baclofen 5 mg PO TID   > On 8/27/2019, increased Baclofen from 5 mg to 10 mg PO TID   > Continue:    > Aspirin 81 mg PO once daily with breakfast    > Atorvastatin 80 mg PO q HS    > Baclofen 10 mg PO TID    > Chronic hypokalemia    Serum Potassium during hospital stay at [address]      08/15/19  0330 08/14/19  1848 08/14/19  0342 08/13/19  1205 08/12/19  2328   K 3.0* 2.8* 2.7* 2.8* 2.4*          > K (8/16/2019, on admission) = 3.8   > Mg (8/16/2019, on admission) = 1.9   > Upon admission to the ARU, patient was given Klor Con 40 meq PO BID with meals x 2 days   > K (8/17/2019) = 3.5   > K (8/18/2019) = 3.7   > K (8/19/2019) = 3.5   > K (8/22/2019) = 3.6   > Mg (8/22/2019) = 2.2   > On 8/22/2019, started Spironolactone 50 mg PO BID   > K (8/24/2019) = 3.8   > K (8/26/2019) = 4.4   > Will need to monitor serum potassium levels closely as patient is at high risk for hyperkalemia due slowly rising serum potassium levels since starting high-dose Spironolactone; planned to add HCTZ or Furosemide to offset the potassium-sparing characteristics of Spironolactone but will hold off due to rising BUN/Creatinine levels   > K (8/28/2019) = 4.6   > Mg (8/28/2019) = 2.3   > On 8/28/2019, discontinued Mg(OH)2 400 mg PO once daily   > K (8/29/2019) = 4.1   > K (9/2/2019) = 4.6   > Continue Spironolactone 50 mg PO BID    > Hypertensive heart and kidney disease without heart failure and with stage 2 chronic kidney disease   > Upon admission to the ARU, increased Hydralazine from 50 mg to 75 mg PO q 8 hr (6AM, 2PM, 10PM)   > Once work-up for primary hyperaldosteronism is complete, plan to start Spironolactone 25 mg PO once daily   > On 8/16/2019:    > Discontinued Metoprolol succinate 50 mg PO once daily (6AM)    > Started:     > Labetalol 200 mg PO q 12 hr (9AM, 9PM)     > Isosorbide dinitrate 10 mg P) q 8 hr (6AM, 2PM, 10PM)   > On 8/18/2019, added Hydralazine 25 mg PO TID PRN for SBP greater than 170 mmHg   > On 8/19/2019:    > Increased Labetalol from 200 mg to 300 mg PO q 12 hr (9AM, 9PM)    > Increased Isosorbide dinitrate from 10 mg to 20 mg PO q 8 hr (6AM, 2PM, 10PM)   > On 8/20/2019:    > Increased Labetalol from 300 mg to 400 mg PO q 12 hr (9AM, 9PM)    > Increased Isosorbide dinitrate from 20 mg to 30 mg PO q 8 hr (6AM, 2PM, 10PM)   > On 8/22/2019:    > Increased Labetalol from 400 mg to 600 mg PO q 12 hr (9AM, 9PM)    > Started Spironolactone 50 mg PO BID   > On 8/27/2019, increased Hydralazine from 75 mg to 100 mg PO q 8 hr (6AM, 2PM, 10PM)   > On 8/28/2019, decreased Hydralazine from 100 mg to 75 mg PO q 8 hr (6AM, 2PM, 10PM)   > On 8/29/2019, Labetalol 600 mg PO q 12 hr (changed from 9AM, 9PM to 6AM, 6PM)   > Plan to add ACE-I or ARB for proteinuria once renal function stable and serum creatinine and serum potassium are no longer rising upwards   > Continue:    > Amlodipine 10 mg PO once daily (9AM)    > Hydralazine 75 mg PO q 8 hr (6AM, 2PM, 10PM)    > Labetalol 600 mg PO q 12 hr (6AM, 6PM)    > Isosorbide dinitrate 30 mg PO q 8 hr (6AM, 2PM, 10PM)    > Spironolactone 50 mg PO BID (9AM, 9PM)    > Hydralazine 25 mg PO TID PRN for SBP greater than 170 mmHg    > Pure hypercholesterolemia   > Lipid profile (8/13/2019) showed , , HDL 42,    > Lipid profile (8/14/2019) showed , , HDL 39, LDL 94   > Continue Atorvastatin 80 mg PO q HS    > Type 2 diabetes mellitus with stage 2 chronic kidney disease   > HbA1c (4/16/2014) = 6.2   > Patient has had chronic kidney disease even before his diagnosis of Type 2 diabetes mellitus and is presumed to be due to prolonged uncontrolled hypertension   > HbA1c (8/13/2019) = 6.6   > Continue Humalog insulin sliding scale SC TID AC    >  ? Primary hyperaldosteronism (ruled out) as etiology of chronic hypokalemia and difficult to control hypertension   > Endocrinology consult (Dr. Baylee Dowell) was called at [facility] prior to discharge/transfer to the ARU   > Aldosterone/Renin activity (8/16/2019):    > Aldosterone = 8.8 (NORMAL)    > Renin Activity = 0.300 (NORMAL)    > Aldosterone/Renin Activity = 29 (ELEVATED) ~ EQUIVOCAL, requires confirmation    > Last intake of Losartan was on 8/11/2019 (prior to admission)    > Endocrinology consult (Dr. Hunter Aguirre):    > ASSESSMENT:     1. Chronic hypokalemia. 2.  Diabetes mellitus. 3.  Diabetic nephropathy with a stage II chronic renal disease. 4.  Normocytic anemia.      5. Vitamin B12 deficiency. 6.  Borderline iron deficiency. 7.  Hypoalbuminemia.     > DISCUSSION:  The patient may well have hyperaldosteronism, but he may have it on the basis of his nephropathy, which is due to his underlying diabetes. The cause of his anemia might be a combination of B12 deficiency and iron deficiency together, so investigation should be done for this.     > PLAN: Laboratory studies:     1.  24-hour urine for aldosteronism (8/20/2019) = 12.78 (N.V. = 0-19) (NORMAL)     2. Methylmalonic acid level (8/18/2019) = 468 (ELEVATED)     3.  Gastrin level (8/20/2019) = <10 (NORMAL)     4. Microalbumin-to-creatinine ratio. 5.  Transferrin and prealbumin levels. > FSH (8/19/2019) = 5.6 (NORMAL)    > LH (8/18/2019) = 6.8 (NORMAL)    > Free T4 (8/18/2019) = 1.0 (NORMAL)    > TSH (8/18/2019) = 4.63 (ELEVATED)    > Urine aldosterone (8/20/2019): 11.1 ug/L    > Transferrin (8/22/2019) = 275   > Excerpt from the Progress Note (dated 8/22/2019) by Dr. Mohinder Phoenix:    > \"Griffin level is not elevated. 24 hour urine griffin is pending. The aldosterone/renin level is not high. If he has primary aldosteronism, it is very likely to be idiopathic rather an an aldosterone producing adenoma. This is susceptible to medical therapy. Will start spironolactone at 50 mg twice daily. \"    > On 8/22/2019, started Spironolactone 50 mg PO BID   > Unable to proceed with plasma aldosterone confirmatory tests as patient had been started on high-dose Spironolactone; unable to determine presence of adrenal adenoma in the absence of imaging studies   > Patient does not have Primary hyperaldosteronism as per Endocrinology   > Continue Spironolactone 50 mg PO BID    > Leukocytosis, resolved   > WBC count (8/15/2019) = 11.7   > No fever documented since admission to the ARU   > WBC count (8/16/2019, on admission) = 17.9   > Work-up:    > Urinalysis (8/16/2019): WNL    > Chest x-ray (PA-Lateral) (8/16/2019) showed a minimally blunted left posterior costophrenic angle could be due to either a tiny effusion or chronic pleural reaction/scarring; mild cardiomegaly; atherosclerosis.    > WBC count (8/18/2019) = 11.6    > Iron deficiency anemia / Vitamin B12 anemia   > Anemia work-up (8/14/2019) showed serum iron 22, TIBC 371, iron % saturation 6, ferritin 34, reticulocyte count 1.4   > Vitamin B12 (8/14/2019) = 208   > Hgb/Hct (8/15/2019) = 10.1/31.3    > Hgb/Hct (8/16/2019, on admission) = 10.7/32.6   > Hgb/Hct (8/18/2019) = 8.7/27.4   > Hgb/Hct (8/19/2019) = 8.4/25.2   > On 8/19/2019, started:     > FeSO4 325 mg PO once daily with breakfast     > Ascorbic Acid 250 mg PO once daily with breakfast (to enhance the absorption of the FeSO4)     > Cyanocobalamin 1,000 mcg PO once daily    > Epoetin tone 10,000 units SC x 1 dose   > Hgb/Hct (8/22/2019) = 8.6/26.3   > On 8/22/2019, Epoetin tone 10,000 units SC x 1 dose     > Hgb/Hct (8/26/2019) = 8.2/25.6   > On 8/26/2019, patient was given Epoetin tone 10,000 units SC x 1 dose     > On 8/27/2019, started Iron sucrose 50 mg IV q 24 hr   > On 8/28/2019:    > Discontinued:     > FeSO4 325 mg PO once daily with breakfast      > Ascorbic Acid 250 mg PO once daily with breakfast (to enhance the absorption of the FeSO4)     > Increased Iron sucrose from 50 mg to 200 mg IV q 24 hr   > Hgb/Hct (8/29/2019) = 8.0/25.5    > Vitamin B12 (8/29/2019) =  409 (from 208 on 8/14/2019)   > On 8/29/2019, discontinued Iron sucrose 200 mg IV q 24 hr   > On 8/30/2019, resumed FeSO4 325 mg PO once daily with breakfast    > On 9/2/2019:    > Discontinued FeSO4 325 mg PO once daily with breakfast     > Resumed Iron sucrose 200 mg IV q 24 hr x 3 doses   > Continue:    > Cyanocobalamin 1,000 mcg PO once daily    > Iron sucrose 200 mg IV q 24 hr (STOP DATE: 9/4/2019)    > Vitamin D Deficiency   > PTH (8/18/2019) = 101.7   > Vitamin D 25-Hydroxy (8/19/2019) = 16.2   > On 8/19/2019, patient was given Cholecalciferol 50,000 units PO x 1 dose    > On 8/20/2019, started Cholecalciferol 5,000 units PO once daily   > Continue Cholecalciferol 5,000 units PO once daily    > Right ankle swelling, most likely dependent edema due to hemiparesis/lack of muscle contraction and osteoarthritis of the right ankle   > (+) nonpainful swelling of the right ankle for the past few days; denies any trauma   > X-ray of the right ankle (8/22/2019) showed no acute fracture or subluxation; advanced degenerative changes at the tibiotalar joint; old fracture fragment vs. accessory ossicle in the inferior aspect of the lateral malleolus; soft tissue swelling around the ankle.   > Elevate right leg/foot when supine in bed    > Stage 2 chronic kidney disease --> Acute kidney injury superimposed on CKD stage 2 VS progression to Stage 3 chronic kidney disease   > On admission, estimated GFR based on a Creatinine of 1.20 mg/dl:               > Using CKD-EPI = 63.1 mL/min/1.73m2               > Using MDRD = 64.6 mL/min/1.73m2   > Estimated GFR, based on a Creatinine of 1.49 mg/dl on 8/26/2019:    > Using CKD-EPI = 48.6 mL/min/1.73m2    > Using MDRD = 57.4 mL/min/1.73m2    > Urine creatinine (8/26/2019) = 31.00   > Microalbumin, urine (8/26/2019) = 56.00   > Microalbumin/Creatinine ratio (8/26/2019) = 1806   > Nephrology consult (Dr. Leigh Ann Peters) for evaluation and comanagement    > Impression:     > Chronic kidney disease stage III with heavy proteinuria, diabetic nephropathy     > Proteinuria, diabetes related, not on any antiproteinuric medication      > Hypertension, well controlled     > Diabetes     > Recent acute ischemic stroke     > Chronic hypokalemia, on spiranolactone     > Anemia     > Secondary hyperparathyroidism    > Plan:     > I will start him on IV Venofer, side effects discussed. Plan to start on Epogen once his iron store is repleted.      > Continue spiranolactone, monitor potassium.      > Monitor renal function for now, will add ACE inhibitor for protienuria in near future. > Adjust medication for current renal function status.    > BUN/Creatinine (8/28/2019) = 37/1.69    > BUN/Creatinine (8/29/2019) = 36/1.54    > BUN/Creatinine (9/2/2019) = 39/1.69     > Diet:   > On 8/16/2019, solids consistency was advanced from Mechanical soft (NDD 2) to Soft solids (NDD 3)   > Modified barium swallow (8/27/2019) showed silent aspiration of thin liquid; no drew penetration or aspiration with other tested consistencies. > Specifications: Diabetic, low saturated fat   > Solids (consistency): Soft solids (NDD 3)   > Liquids (consistency): Mildly thick (Nectar-thick)        8. Patient's progress in rehabilitation and medical issues discussed with the patient. All questions answered to the best of my ability. Care plan discussed with patient, wife and nurse. 9. Discharge Planning:  Discharge date  9/5/2019 (Thursday)   Discharge location  [facility] / [place] [id number])   Follow-up appointments (to be scheduled at [facility])  1. PCP Dean Billy NP)    2. Neurology (Dr. Holden Bueno)   3.  Nephrology (Dr. Vishal Fleming)       Signed:    Yin Carranza MD    September 4, 2019

## 2019-09-04 NOTE — PROGRESS NOTES
Problem: Mobility Impaired (Adult and Pediatric)  Goal: *Acute Goals and Plan of Care (Insert Text)  Description  Physical Therapy Short Term Goals  Initiated 8/16/2019 and to be accomplished within 14 day(s)  1. Patient will move from supine to sit and sit to supine  in bed with minimal assistance/contact guard assist. -met 8/30/19  2. Patient will transfer from bed to chair and chair to bed with minimal/moderate assistance using the least restrictive device. - met 8/30/19  3. Patient will perform sit to stand with minimal assistance/moderate assistance. - met 8/30/19  4. Patient will ambulate with moderate assistance  for 10 feet with the least restrictive device. -met 8/30/19    Physical Therapy Short Term Goals  Initiated 8/30/2019 and to be accomplished within 7 day(s)  1. Patient will move from supine to sit and sit to supine  in bed with supervision/set-up. 2.  Patient will transfer from bed to chair and chair to bed with minimal assistance/contact guard assist using the least restrictive device. 3.  Patient will perform sit to stand with minimal assistance/contact guard assist from w/c height. 4.  Patient will ambulate with minimal assistance/contact guard assist for 60 feet with the least restrictive device. Physical Therapy Goals  Initiated 8/16/2019 and to be accomplished within 4 weeks  1. Patient will move from supine to sit and sit to supine  in bed with supervision/set-up. 2.  Patient will transfer from bed to chair and chair to bed with supervision/set-up using the least restrictive device. 3.  Patient will perform sit to stand with supervision/set-up. 4.  Patient will ambulate with minimal assistance for 50 feet with the least restrictive device. 5.  Patient will ascend/descend 2 stairs with 1 handrail(s) with moderate assistance .         Outcome: Progressing Towards Goal   PHYSICAL THERAPY TREATMENT    Patient: Sumeet Poe (57 y.o. male)  Date: 9/4/2019  Diagnosis: Acute ischemic stroke (HCC) [I63.9] Acute ischemic stroke [facility])  Precautions: Aspiration, NWB  Chart, physical therapy assessment, plan of care and goals were reviewed. Time In: 930  Time Out:   Patient Seen For: Balance activities; Wheelchair mobility;Transfer training; Therapeutic exercise;Gait training;Patient education  Pain:  Pt pain was reported as  0 pre-treatment. Pt pain was reported as 0 post-treatment. Intervention: n/a    Patient identified with name and :yes    SUBJECTIVE:     \"I just want my leg to work\"    OBJECTIVE DATA SUMMARY:   Objective:     GROSS ASSESSMENT Daily Assessment            BED/MAT MOBILITY Daily Assessment    Supine to Sit : 4 (Contact guard assistance)  Sit to Supine : 4 (Minimal assistance) to raise right LE onto mat       TRANSFERS Daily Assessment    Other: stand step without assistive device  Transfer Assistance : 4 (Minimal assistance)  Sit to Stand Assistance: Minimal assistance(with additional time and facilitation at posterior thigh)    Step by step verbal cues for proper weight shifting and muscle contractions to keep movements slow during transfer       GAIT Daily Assessment    Amount of Assistance: 4 (Minimal assistance)  Distance (ft): 30 Feet (ft)(20)  Assistive Device: Cane, quad    On first gait trial patient with frequent hyperextension of the knee, once this happened once he became frustrated and it happened more.   On second gait trial patient was able to slow down and had less hyperextension until fatigue set in       STEPS or STAIRS Daily Assessment     NT       BALANCE Daily Assessment    Sitting - Static: Good (unsupported)  Sitting - Dynamic: Fair (occasional)  Standing - Static: (fair-)       WHEELCHAIR MOBILITY Daily Assessment    Able to Propel (ft): 300 feet  Functional Level: 5  Curbs/Ramps Assist Required (FIM Score): 4 (Minimal assistance)  Wheelchair Setup Assist Required : (assistance with right LE)  Wheelchair Management: Manages left brake;Manages right brake       THERAPEUTIC EXERCISES Daily Assessment    Extremity: Both  Exercise Type #1: Supine lower extremity strengthening  Sets Performed: 2  Reps Performed: 10  Level of Assist: Supervision    Pt demonstrated increased Right LE control during supine exercises         ASSESSMENT:  Pt is progressing and making slow, steady gains however this isn't translating into a quick increase in functional mobility which is starting to frustrate the patient. Progression toward goals:  ?      Improving appropriately and progressing toward goals  ? Improving slowly and progressing toward goals  ? Not making progress toward goals and plan of care will be adjusted     PLAN:  Patient continues to benefit from skilled intervention to address the above impairments. Continue treatment per established plan of care. Discharge Recommendations:  Allen Witt  Further Equipment Recommendations for Discharge:  N/A     Estimated LOS:9/5/19    Activity Tolerance:   fair  Please refer to the flowsheet for vital signs taken during this treatment.     After treatment:   Patient left sitting in w/c going to speech therapy      Norris Chow, PT  9/4/2019

## 2019-09-04 NOTE — PROGRESS NOTES
Problem: Self Care Deficits Care Plan (Adult)  Goal: *Therapy Goal (Edit Goal, Insert Text)  Description  Occupational Therapy Goals   Short Term Goals=Long Term Goals  Initiated 8/16/2019 and to be accomplished within 4 week(s) (Updates 9/4/2019)  1. Pt will perform self-feeding with MI.  2. Pt will perform grooming with Set-up. ()  3. Pt will perform UB bathing with SBA. 4. Pt will perform LB bathing with Luba/CGA. (; upgrade to SBA)  5. Pt will perform tub/shower transfer with Luba/CGA using LRAD. ()  6. Pt will perform UB dressing with Set-up. (; upgrade to MI)  7. Pt will perform LB dressing with Luba/CGA. (; upgrade to SBA)  8. Pt will perform toileting task with Luba/CGA. (; upgrade to SBA)  9. Pt will perform toilet transfer with Luba/CGA using LRAD. ()      Short Term Goals   Initiated 8/16/2019 and to be accomplished within 7 day(s)   1. Pt will perform self-feeding with S; using compensatory strategies for right hand incorporation into set-up. ()  2. Pt will perform simple grooming task, including denture mgmt, with Luba and compensatory strategies as needed. (; washing face- pt reporting spouse previously assisted with denture mgmt)  3. Pt will perform UB bathing with Luba using AE as needed. ()  4. Pt will perform LB bathing with ModA using AE and compensatory strategies as needed. ()  5. Pt will perform tub/shower transfer with modA x 1 <>TTB. ()  6. Pt will perform UB dressing with CGA. (GM)  7. Pt will perform LB dressing with ModA using AE as needed. (GM)  8. Pt will perform toileting task with ModA. ()  9. Pt will perform toilet transfer with ModA x 1 <>3:1 commode.  ()         Outcome: Resolved/Not Met    OCCUPATIONAL THERAPY DISCHARGE    Patient: Tasha Scanlon (1 y.o. male)  Date: 9/4/2019    First Tx Session  Time In: 5  Time Out[de-identified] 200    Primary Diagnosis: Acute ischemic stroke [facility]) [I63.9] Acute ischemic stroke [facility])    Precautions: Aspiration, Fall Barriers to Learning/Limitations: yes;  sensory deficits-vision/hearing/speech  Compensate with: visual, verbal, tactile, kinesthetic cues/model     Patient identified with name and : yes     SUBJECTIVE:   Patient stated I feel like my left arm is doing all the work,\" referring to performing UB arm bike task. Pt continually educated about neuroplasticity and proximal to distal return with poor carryover or understanding despite pt stating understanding. Pt re assured of progress made in ARU thus far to increase pt moral.     OBJECTIVE DATA SUMMARY:   Pt approached for tx seated in w/c in room, pt agreeable. Pt performing shower this AM. Following shower, pt brought down to therapy gym for NMRE. Spouse present after shower, requesting to watch remainder of therapy session for carryover of exercises.        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     On Allopurinol    Chronic hypokalemia     Persistent chronic hypokalemia + hypertension; ?primary hyperaldosteronism    CKD (chronic kidney disease) stage 2, GFR 60-89 ml/min 8/15/2019    Current use of aspirin 2019    Dysarthria 2019    Dysphagia 2019    Elevated prostate specific antigen (PSA) 2011    First degree atrioventricular block by electrocardiogram 2019    Hypertensive heart and kidney disease without heart failure and with stage 2 chronic kidney disease     Iron deficiency anemia 2019    Anemia work-up (2019) showed serum iron 22, TIBC 371, iron % saturation 6, ferritin 34, reticulocyte count 1.4     Obesity, Class I, BMI 30-34.9     Obstructive sleep apnea on CPAP     On statin therapy due to risk of future cardiovascular event 2019    On Atorvastatin  Primary osteoarthritis of right ankle 8/22/2019    X-ray of the right ankle (8/22/2019) showed no acute fracture or subluxation; advanced degenerative changes at the tibiotalar joint; old fracture fragment vs. accessory ossicle in the inferior aspect of the lateral malleolus; soft tissue swelling around the ankle.  Pure hypercholesterolemia 08/15/2019    Lipid profile (8/13/2019) showed , , HDL 42, ; Lipid profile (8/14/2019) showed , , HDL 39, LDL 94    Received intravenous tissue plasminogen activator (tPA) in emergency department 8/12/2019    Refusal of statin medication by patient 8/13/2019    As per note of Neurology (Dr. Luis Farah), patient refuses statin agent because of potential side effects.  Right hemiparesis ([facility] [address] 8/12/2019    Secondary hyperparathyroidism of renal origin ([facility] [address] 8/18/2019    PTH (8/18/2019) = 101.7    Type 2 diabetes mellitus with stage 2 chronic kidney disease (HCC)     HbA1c (8/13/2019) = 6.6    Vitamin B12 deficiency anemia 8/14/2019    Vitamin B12 (8/14/2019) = 208    Vitamin D deficiency 8/19/2019    Vitamin D 25-Hydroxy (8/19/2019) = 16.2      Past Surgical History:   Procedure Laterality Date    COLONOSCOPY N/A 4/15/2019    COLONOSCOPY performed [facility] Sky [facility]ung MD [facility] ENDOSCOPY    HX TONSILLECTOMY       Prior Level of Function/Home Situation: Pt reporting (I) in ADL/IADL w/o use of AD PTA  Home Situation  Home Environment: Private residence  # Steps to Enter: 2  Rails to Enter: No  One/Two Story Residence: One story  Living Alone: No(Living with spouse)  Support Systems: Friends \ neighbors, Spouse/Significant Other/Partner  Patient Expects to be Discharged to[de-identified] Private residence  Current DME Used/Available at Home: Elvi Saels, rollator  Tub or Shower Type: Shower  ? Right hand dominant   ?      Left hand dominant    NMRE:  Pt performed UB arm bike with right hand wrapped via ace wrap to (A) with  x 10 minutes to facilitate bilateral coordination and proprioceptive feedback to the right UE necessary for motor return. Pt performed AAROM via arm skate at lowered table x 10 repetitions abduction/adduction. Pt able to perform adduction w/o compensatory strategies, activating pectorals, but demo'ed compensation to perform abduction using trunk movement 2/2 to poor motor activation of triceps. Pt performed placing right hand on small ball with dycem placed to (A) with placement, pt instructed to roll ball forward/back x 10 repetitions to initiate shoulder flexors/ext with pt demo'ing motor activation of proximal musculature. Pain:  Pt reports 0/10 pain or discomfort prior to treatment. Pt reports 0/10 pain or discomfort post treatment. Problem List:    Decreased strength R UE  ? Decreased strength trunk/core  ? Decreased AROM R UE  ? Decreased PROM  ? Decreased balance sitting  ? Decreased balance standing  ? Decreased endurance  ? Pain  ? Functional Limitations:   Decreased independence with ADL  ? Decreased independence with functional transfers  ? Decreased independence with ambulation  ? Decreased independence with IADL  ? Outcome Measures: FIM, MMT      MMT Initial Assessment    Right Upper Extremity  Left Upper Extremity    UE AROM Pt unable to perform AROM 2/2 to hemiparesis. PROM assessed and WFL w/o pain noted in all planes.   WFL AROM; 4/5 gross MMT   Shoulder flexion 0/5     Shoulder extension 0/5     Shoulder ABDuction 0/5     Shoulder ADDUction 0/5     Elbow Flexion 0/5     Elbow Extension 0/5     Wrist Extension/Flexion 0/5      0/5 [facility]             MMT Discharge Assessment   Right Upper Extremity  Left Upper Extremity    UE AROM WFL PROM; proximal motor activation right UE  WFL; 4+/5 gross MMT overall    Shoulder flexion 1/5    Shoulder extension 1/5    Shoulder ABDuction 0/5    Shoulder ADDUction 2-/5    Elbow Flexion 0/5    Elbow Extension 0/5 Wrist Extension/Flexion 0/5     0/5 WFL propelling w/c with left UE       0/5 No palpable muscle contraction  1/5 Palpable muscle contraction, no joint movement  2-/5 Less than full range of motion in gravity eliminated position  2/5 Able to complete full range of motion in gravity eliminated position  2+/5 Able to initiate movement against gravity  3-/5 More than half but not full range of motion against gravity  3/5 Able to complete full range of motion against gravity  3+/5 Completes full range of motion against gravity with minimal resistance  4-/5 Completes full range of motion against gravity with minimal resistance  4/5 Completes full range of motion against gravity with moderate resistance  5/5 Completes full range of motion against gravity with maximum resistance    Coordination: Intact left UE digit:digit opposition and finger<>nose. Impaired right UE 2/2 to hemiparesis   Sensation: Intact (B) UEs    FIM SCORES Initial Assessment Discharge Assessment   Eating 5 Feeding/Eating  Feeding/Eating Assistance: 5 (Supervision/setup)(Set-up)  Comments: Set-up of containers in prep for self feeding with utensils I'ly 5 (set up)   Grooming 3 Grooming  Grooming Assistance : 5 (Supervision)(Set-up)  Comments: Pt provided with set-up of wash cloth in hand, pt able to open soap and placed on wash cloth with left hand I'ly     Oral Hygiene FIM: 0  Pt wears dentures, and requests spouse (A) for this task. Bathing 2 Upper Body Bathing  Bathing Assistance, Upper: 4 (Minimal assistance)  Upper Body : Compensatory technique training  Position Performed: Seated in chair  Adaptive Equipment: Grab bar;Long handled sponge; Tub bench  Comments: Pt required (A) washing right axilla thoroughly, pt used LH sponge to wash left axilla. Pt required (A) for washing lumbar back.    Lower Body Bathing  Bathing Assistance, Lower : 4 (Minimal assistance)  Adaptive Equipment: Grab bar;Long handled sponge  Position Performed: Seated in chair  Adaptive Equipment: Tub bench  Comments:  Pt required drying (B) feet after shower, required thoroughness asst for washing buttocks with pt forward flexing on tub bench. Upper Body Dressing 4 Upper Body Dressing   Dressing Assistance : 5 (Supervision)(set-up)  Comments: Pt doffed shirt Erica, pt donned shirt with karthik technique with set-up of shirt in lap. Lower Body Dressing 2 Lower Body Dressing   Dressing Assistance : 4 (Minimal assistance)  Leg Crossed Method Used: No  Position Performed: Seated in chair;Standing  Adaptive Equipment Used: Reacher;Sock aid  Comments: Pt doffed socks with use of reacher with Erica. Pt donned socks with sock aide with mod tactile asst during set-up and asst with right plantar flexion of foot for successful donning. Sock and/or Shoe Management FIM: 5 (set-up)   Toileting 2 Toileting  Toileting Assistance (FIM Score): 4 (Minimal assistance)(CGA)  Cues: Tactile cues provided  Adaptive Equipment: Grab bars; Other (comment)   Tub/Shower Transfer 0 Tub/Shower Transfer Score: 4 w/c<>TTB with stand step transfer with increased time. Toilet Transfer 1 Functional Transfers  Toilet Transfer : Stand pivot transfer without device  Amount of Assistance Required: 4 (Minimal assistance)(CGA)  Tub or Shower Type: Shower  Amount of Assistance Required: 4 (Minimal assistance)  Adaptive Equipment: Bedside commode;Grab bars; Tub transfer bench; Wheelchair   Comprehension 4 Score: 5   Expression 4 Score: 5   Social Interaction 4 Score: 5   Problem Solving 4 Score: 4   Memory 4 Score: 5   Please see [facility] Interdisciplinary Eval: Coordination/Balance Section for details regarding FIM score description. Activity Tolerance:   Good, pt requires OT initiated rest breaks for energy conservation during ADL     ASSESSMENT:  Pt demo'ing functional plateau towards OT POC this progress period, as evidenced by meeting 1x LTG.  Pt continues to require CGA during standing tasks 2/2 to variable static/dynamic balance performing LB dressing/toileting tasks. Pt continues to benefit from cues to slow transfer pace to allow for good weight shifting to increase safety. Pt demoing continued proximal motor activation in right UE, requiring continued encouragement and emphasis on importance of visualization and right UE facilitation for increased neuroplasticity with variable understanding noted. Pt has very supportive spouse as well as demo'ing great motivation to return to PLOF. Progression toward goals:  ?      Improving appropriately and progressing toward goals  ? Improving slowly and progressing toward goals  ? Not making progress toward goals and plan of care will be adjusted     PLAN:  Pt would benefit from continued skilled occupational therapy in order to improve ADL function and IADL with use of least restrictive device. Interventions may include range of motion (AROM, PROM B UE/trunk), motor function (B UE/trunk strengthening/coordination), NMRE activity tolerance (vitals, oxygen saturation levels), ADL, balance activities, IADL, and functional transfer training. Discharge Recommendations: Allen Witt  Further Equipment Recommendations for Discharge: Bariatric 3:1, TTB        Please refer to the flow sheet for vital signs taken during this treatment. After treatment:   ?  Patient left in no apparent distress sitting up in wheel chair  ? Patient left in no apparent distress in bed  ? Call bell left within reach  ? Nursing notified  ? Spouse present  ? chair alarm activated    COMMUNICATION/EDUCATION:   Communication/Collaboration:  ? Home safety education was provided and the patient/caregiver indicated understanding. ? Patient/family have participated as able and agree with findings and recommendations. ?      Patient is unable to participate in plan of care at this time.     [address] [facility],   9/4/2019

## 2019-09-05 VITALS
WEIGHT: 224.9 LBS | TEMPERATURE: 98 F | HEART RATE: 69 BPM | RESPIRATION RATE: 18 BRPM | HEIGHT: 69 IN | SYSTOLIC BLOOD PRESSURE: 123 MMHG | OXYGEN SATURATION: 97 % | DIASTOLIC BLOOD PRESSURE: 62 MMHG | BODY MASS INDEX: 33.31 KG/M2

## 2019-09-05 LAB
ANION GAP SERPL CALC-SCNC: 7 MMOL/L (ref 3–18)
BUN SERPL-MCNC: 37 MG/DL (ref 7–18)
BUN/CREAT SERPL: 22 (ref 12–20)
CALCIUM SERPL-MCNC: 8.9 MG/DL (ref 8.5–10.1)
CHLORIDE SERPL-SCNC: 109 MMOL/L (ref 100–111)
CO2 SERPL-SCNC: 24 MMOL/L (ref 21–32)
CREAT SERPL-MCNC: 1.69 MG/DL (ref 0.6–1.3)
GLUCOSE BLD STRIP.AUTO-MCNC: 113 MG/DL (ref 70–110)
GLUCOSE SERPL-MCNC: 86 MG/DL (ref 74–99)
HCT VFR BLD AUTO: 27.2 % (ref 36–48)
HGB BLD-MCNC: 8.7 G/DL (ref 13–16)
PLATELET # BLD AUTO: 234 K/UL (ref 135–420)
POTASSIUM SERPL-SCNC: 4.6 MMOL/L (ref 3.5–5.5)
SODIUM SERPL-SCNC: 140 MMOL/L (ref 136–145)

## 2019-09-05 PROCEDURE — 80048 BASIC METABOLIC PNL TOTAL CA: CPT

## 2019-09-05 PROCEDURE — 92507 TX SP LANG VOICE COMM INDIV: CPT

## 2019-09-05 PROCEDURE — 36415 COLL VENOUS BLD VENIPUNCTURE: CPT

## 2019-09-05 PROCEDURE — 74011250637 HC RX REV CODE- 250/637: Performed by: INTERNAL MEDICINE

## 2019-09-05 PROCEDURE — 97110 THERAPEUTIC EXERCISES: CPT

## 2019-09-05 PROCEDURE — 97112 NEUROMUSCULAR REEDUCATION: CPT

## 2019-09-05 PROCEDURE — 85049 AUTOMATED PLATELET COUNT: CPT

## 2019-09-05 PROCEDURE — 97116 GAIT TRAINING THERAPY: CPT

## 2019-09-05 PROCEDURE — 92526 ORAL FUNCTION THERAPY: CPT

## 2019-09-05 PROCEDURE — 82962 GLUCOSE BLOOD TEST: CPT

## 2019-09-05 PROCEDURE — 74011250636 HC RX REV CODE- 250/636: Performed by: INTERNAL MEDICINE

## 2019-09-05 PROCEDURE — 85018 HEMOGLOBIN: CPT

## 2019-09-05 PROCEDURE — 97530 THERAPEUTIC ACTIVITIES: CPT

## 2019-09-05 RX ORDER — ASCORBIC ACID 250 MG
250 TABLET ORAL
Qty: 30 TAB | Refills: 0 | Status: SHIPPED
Start: 2019-09-06 | End: 2021-05-28

## 2019-09-05 RX ORDER — LANOLIN ALCOHOL/MO/W.PET/CERES
1 CREAM (GRAM) TOPICAL
Status: DISCONTINUED | OUTPATIENT
Start: 2019-09-06 | End: 2019-09-05 | Stop reason: HOSPADM

## 2019-09-05 RX ORDER — ASCORBIC ACID 250 MG
250 TABLET ORAL
Status: DISCONTINUED | OUTPATIENT
Start: 2019-09-06 | End: 2019-09-05 | Stop reason: HOSPADM

## 2019-09-05 RX ORDER — LANOLIN ALCOHOL/MO/W.PET/CERES
325 CREAM (GRAM) TOPICAL
Qty: 30 TAB | Refills: 0 | Status: SHIPPED
Start: 2019-09-06

## 2019-09-05 RX ADMIN — SPIRONOLACTONE 50 MG: 25 TABLET ORAL at 08:57

## 2019-09-05 RX ADMIN — Medication 5000 UNITS: at 08:57

## 2019-09-05 RX ADMIN — ISOSORBIDE DINITRATE 30 MG: 20 TABLET ORAL at 06:34

## 2019-09-05 RX ADMIN — ASPIRIN 81 MG 81 MG: 81 TABLET ORAL at 08:57

## 2019-09-05 RX ADMIN — BACLOFEN 10 MG: 10 TABLET ORAL at 13:08

## 2019-09-05 RX ADMIN — LABETALOL HYDROCHLORIDE 600 MG: 200 TABLET, FILM COATED ORAL at 06:34

## 2019-09-05 RX ADMIN — AMLODIPINE BESYLATE 10 MG: 10 TABLET ORAL at 08:57

## 2019-09-05 RX ADMIN — CYANOCOBALAMIN TAB 1000 MCG 1000 MCG: 1000 TAB at 09:00

## 2019-09-05 RX ADMIN — BACLOFEN 10 MG: 10 TABLET ORAL at 06:33

## 2019-09-05 RX ADMIN — HEPARIN SODIUM 5000 UNITS: 5000 INJECTION INTRAVENOUS; SUBCUTANEOUS at 06:34

## 2019-09-05 RX ADMIN — HYDRALAZINE HYDROCHLORIDE 75 MG: 50 TABLET, FILM COATED ORAL at 06:33

## 2019-09-05 NOTE — PROGRESS NOTES
Problem: Diabetes Self-Management  Goal: *Disease process and treatment process  Description  Define diabetes and identify own type of diabetes; list 3 options for treating diabetes. Outcome: Progressing Towards Goal  Goal: *Incorporating nutritional management into lifestyle  Description  Describe effect of type, amount and timing of food on blood glucose; list 3 methods for planning meals. Outcome: Progressing Towards Goal  Goal: *Incorporating physical activity into lifestyle  Description  State effect of exercise on blood glucose levels. Outcome: Progressing Towards Goal  Goal: *Developing strategies to promote health/change behavior  Description  Define the ABC's of diabetes; identify appropriate screenings, schedule and personal plan for screenings. Outcome: Progressing Towards Goal  Goal: *Using medications safely  Description  State effect of diabetes medications on diabetes; name diabetes medication taking, action and side effects. Outcome: Progressing Towards Goal  Goal: *Monitoring blood glucose, interpreting and using results  Description  Identify recommended blood glucose targets  and personal targets. Outcome: Progressing Towards Goal  Goal: *Prevention, detection, treatment of acute complications  Description  List symptoms of hyper- and hypoglycemia; describe how to treat low blood sugar and actions for lowering  high blood glucose level. Outcome: Progressing Towards Goal  Goal: *Prevention, detection and treatment of chronic complications  Description  Define the natural course of diabetes and describe the relationship of blood glucose levels to long term complications of diabetes.   Outcome: Progressing Towards Goal  Goal: *Developing strategies to address psychosocial issues  Description  Describe feelings about living with diabetes; identify support needed and support network  Outcome: Progressing Towards Goal  Goal: *Insulin pump training  Outcome: Progressing Towards Goal  Goal: *Sick day guidelines  Outcome: Progressing Towards Goal  Goal: *Patient Specific Goal (EDIT GOAL, INSERT TEXT)  Outcome: Progressing Towards Goal     Problem: Patient Education: Go to Patient Education Activity  Goal: Patient/Family Education  Outcome: Progressing Towards Goal     Problem: Falls - Risk of  Goal: *Absence of Falls  Description  Document Syeda Moseley Fall Risk and appropriate interventions in the flowsheet. Outcome: Progressing Towards Goal  Note:   Fall Risk Interventions:  Mobility Interventions: Bed/chair exit alarm, Patient to call before getting OOB    Mentation Interventions: Bed/chair exit alarm    Medication Interventions: Bed/chair exit alarm, Patient to call before getting OOB    Elimination Interventions: Call light in reach, Bed/chair exit alarm, Patient to call for help with toileting needs    History of Falls Interventions: Bed/chair exit alarm         Problem: Patient Education: Go to Patient Education Activity  Goal: Patient/Family Education  Outcome: Progressing Towards Goal     Problem: Pressure Injury - Risk of  Goal: *Prevention of pressure injury  Description  Document Bartolome Scale and appropriate interventions in the flowsheet. Outcome: Progressing Towards Goal  Note:   Pressure Injury Interventions:  Sensory Interventions: Assess changes in LOC    Moisture Interventions: Absorbent underpads    Activity Interventions: Increase time out of bed, Pressure redistribution bed/mattress(bed type)    Mobility Interventions: Pressure redistribution bed/mattress (bed type)    Nutrition Interventions: Document food/fluid/supplement intake    Friction and Shear Interventions: HOB 30 degrees or less                Problem: Patient Education: Go to Patient Education Activity  Goal: Patient/Family Education  Outcome: Progressing Towards Goal     Problem: Falls - Risk of  Goal: *Absence of Falls  Description  Document Thu Fall Risk and appropriate interventions in the flowsheet.   Outcome: Progressing Towards Goal  Note:   Fall Risk Interventions:  Mobility Interventions: Bed/chair exit alarm, Patient to call before getting OOB    Mentation Interventions: Bed/chair exit alarm    Medication Interventions: Bed/chair exit alarm, Patient to call before getting OOB    Elimination Interventions: Call light in reach, Bed/chair exit alarm, Patient to call for help with toileting needs    History of Falls Interventions: Bed/chair exit alarm         Problem: Patient Education: Go to Patient Education Activity  Goal: Patient/Family Education  Outcome: Progressing Towards Goal     Problem: Pain  Goal: *Control of Pain  Outcome: Progressing Towards Goal  Goal: *PALLIATIVE CARE:  Alleviation of Pain  Outcome: Progressing Towards Goal     Problem: Patient Education: Go to Patient Education Activity  Goal: Patient/Family Education  Outcome: Progressing Towards Goal     Problem: Dysphagia (Adult)  Goal: *Speech Goal: (INSERT TEXT)  Description  Long term goals  Patient will:  1. Tolerate regular diet with thin liquids using safe swallowing techniques without s/s of aspiration in 5/6 meals. 2. Use safe swallowing techniques (including slowed rate, small bites/sips, alternate liquids/solids, effortful swallow, head rotation to right for liquids) with supervision. 3. Participate in MBS study prior to advancing to thin liquids to assure safety (no aspiration). 4. Perform oral and pharyngeal strengthening exercises (to improve speech and swallowing) with supervision-modified independence. 5. Demonstrate 90% intelligibility in conversation using appropriate speaking strategies (slowed rate, overarticulation, increased volume). 6. Recall 3 words after 5 minutes with supervision. 7. Perform functional problem solving/reasoning tasks with 90% accuracy. 8. Name 8-10 items in categories of increasing abstraction, supervision. Short term goals (by 9/6/19)  Patient will:   1.  Tolerate soft diet with nectar thick liquids using safe swallowing techniques without s/s of aspiration in 5/6 meals. 2. Use safe swallowing techniques (including slowed rate, small bites/sips, alternate liquids/solids, effortful swallow, head rotation to right for liquids) with min cues. 3. Tolerate small amounts of ice chips and water with the SLP using head rotation to the right and/or chin tuck, without overt s/s of aspiration. 4. Perform oral and pharyngeal strengthening exercises (to improve speech and swallowing) with supervision. 5. Demonstrate 90% intelligibility in sentences using appropriate speaking strategies (slowed rate, overarticulation, increased volume). 6. Recall 3 words after 5 minutes with min assist.  7. Perform functional problem solving/reasoning tasks with 75-85% accuracy. 8. Name 8-10 items in concrete categories, supervision.        Outcome: Progressing Towards Goal     Problem: Patient Education: Go to Patient Education Activity  Goal: Patient/Family Education  Outcome: Progressing Towards Goal     Problem: Injury - Risk of, Adverse Drug Event  Goal: *Absence of adverse drug events  Outcome: Progressing Towards Goal  Goal: *Absence of medication errors  Outcome: Progressing Towards Goal  Goal: *Knowledge of prescribed medications  Outcome: Progressing Towards Goal     Problem: Patient Education: Go to Patient Education Activity  Goal: Patient/Family Education  Outcome: Progressing Towards Goal     Problem: Deep Venous Thrombosis - Risk of  Goal: *Absence of deep venous thrombosis signs and symptoms(Stroke Metric)  Outcome: Progressing Towards Goal  Goal: *Absence of impaired coagulation signs and symptoms  Outcome: Progressing Towards Goal  Goal: *Knowledge of prescribed medications  Outcome: Progressing Towards Goal  Goal: *Absence of bleeding  Outcome: Progressing Towards Goal     Problem: Inpatient Rehab (Adult)  Goal: *LTG: Avoids injury/falls 100% of time related to deficits  Outcome: Progressing Towards Goal  Goal: *LTG: Avoids infection 100% of time related to deficits  Outcome: Progressing Towards Goal  Goal: *LTG: Verbalize understanding of diagnosis and risk factors for recurring stroke  Outcome: Progressing Towards Goal  Goal: *LTG: Absence of DVT during hospitalization  Outcome: Progressing Towards Goal  Goal: *LTG: Maintains Skin Integrity With No Evidence of Pressure Injury 100% of Time  Outcome: Progressing Towards Goal  Goal: Interventions  Outcome: Progressing Towards Goal     Problem: Patient Education: Go to Patient Education Activity  Goal: Patient/Family Education  Outcome: Progressing Towards Goal

## 2019-09-05 NOTE — PROGRESS NOTES
Problem: Dysphagia (Adult)  Goal: *Speech Goal: (INSERT TEXT)  Description  Long term goals  Patient will:  1. Tolerate regular diet with thin liquids using safe swallowing techniques without s/s of aspiration in 5/6 meals. 2. Use safe swallowing techniques (including slowed rate, small bites/sips, alternate liquids/solids, effortful swallow, head rotation to right for liquids) with supervision. 3. Participate in MBS study prior to advancing to thin liquids to assure safety (no aspiration). 4. Perform oral and pharyngeal strengthening exercises (to improve speech and swallowing) with supervision-modified independence. 5. Demonstrate 90% intelligibility in conversation using appropriate speaking strategies (slowed rate, overarticulation, increased volume). 6. Recall 3 words after 5 minutes with supervision. 7. Perform functional problem solving/reasoning tasks with 90% accuracy. 8. Name 8-10 items in categories of increasing abstraction, supervision. Short term goals (by 9/6/19)  Patient will:   1. Tolerate soft diet with nectar thick liquids using safe swallowing techniques without s/s of aspiration in 5/6 meals. 2. Use safe swallowing techniques (including slowed rate, small bites/sips, alternate liquids/solids, effortful swallow, head rotation to right for liquids) with min cues. 3. Tolerate small amounts of ice chips and water with the SLP using head rotation to the right and/or chin tuck, without overt s/s of aspiration. 4. Perform oral and pharyngeal strengthening exercises (to improve speech and swallowing) with supervision. 5. Demonstrate 90% intelligibility in sentences using appropriate speaking strategies (slowed rate, overarticulation, increased volume). 6. Recall 3 words after 5 minutes with min assist.  7. Perform functional problem solving/reasoning tasks with 75-85% accuracy. 8. Name 8-10 items in concrete categories, supervision.        Note:   SPEECH LANGUAGE PATHOLOGY TREATMENT    Patient: Louetta Halsted (78 y.o. male)  Date: 9/5/2019  Diagnosis: Acute ischemic stroke [facility]) [I63.9] Acute ischemic stroke ([facility] [address]       Time in: 0830 1030 1230  Time Out:  0900 1100 1300  SUBJECTIVE:   Patient stated . OBJECTIVE:   Mental Status:  Mr. Jesusita Dumont was awake and alert at treatment times, but not as attentive. His focus was upon his discharge later today. Treatment & Interventions:   Patient was seen in the dining room at mealtime for breakfast and lunch. He continues to receive an advanced soft diet with nectar thick liquids. Although he is more attentive to bolus size with solids, he continues to eat quite rapidly and needs cues to slow his intake. He also continues to present more food to his mouth prior to fully clearing the previous bite. Mr. Jesusita Dumont did clear his throat 3 times during the morning meal and needed cues to swallow again to clear the material.  His wife was present at lunchtime and is quite familiar with the swallowing techniques and cues him quite well. Patient was also seen for a thirty minute speech therapy session this morning. Mrs. Jesusita Dumont was also present for the session. The following treatment tasks were presented: Motor Speech:   Review oral/pharyngeal ex: Supervision  Trials of ice chips:  Patient tolerated well, cleared throat x 2  Sips of water: Mod-max cues for cup sips      Throat clearing x 1 and moments after trials  Sheyla Maneuver:  Difficult, but attainable    Neuro-Linguistics:   Orientation:   Supervision  Determining outcomes: 70% accuracy      100% with additional cues  Reasoning:   Supervision    Response & Tolerance to Activities:  Mr. Jesusita Dumont continues to demonstrate frustration with mealtime cues provided by the SLP. Significant impulsivity is a barrier to safe PO intake.   Cognition also impaired; patient has significant difficulty with abstract thinking or generating alternative solutions    Pain:  Pain Scale 1: Numeric (0 - 10) Pain Description 1: Aching  After treatment:   ?       Patient left in no apparent distress sitting up in chair  ? Patient left in no apparent distress in bed  ? Call bell left within reach  ? Nursing notified  ? Caregiver present  ? Bed alarm activated    ASSESSMENT:   Progression toward goals:  ?       Improving appropriately and progressing toward goals  ? Improving slowly and progressing toward goals  ? Not making progress toward goals and plan of care will be adjusted    PLAN:   Patient continues to benefit from skilled intervention to address the above impairments. Continue treatment per established plan of care. Discharge Recommendations:  [facility]    Estimated LOS: Patient will be discharged to SNF later this afternoon.     Sean Silva, SLP  Time Calculation:  90 Minutes

## 2019-09-05 NOTE — DISCHARGE SUMMARY
[facility] PHYSICAL REHABILITATION  [address] [place] [address], [address]     INPATIENT REHABILITATION  DISCHARGE SUMMARY    Name: Louetta Halsted MRN: [id number]   Age / Sex: 71 y.o. / male CSN: [id number]   YOB: 1955 Length of Stay: 21 days   Admit Date: 8/15/2019 Discharge Date: 9/5/2019        PRIMARY CARE PHYSICIAN: Lakisha Jeff NP      DISCHARGE DIAGNOSES:    Primary Rehabilitation Diagnosis  1. Impaired Mobility and ADLs  2. Acute Ischemic Stroke (acute/early subacute infarct at the left posterior basal ganglia to corona radiata) with residual right hemiparesis, dysphagia and dysarthria     Comorbidities   Obesity, Class I, BMI 30-34.9 E66.9    Obstructive sleep apnea on CPAP G47.33, Z99.89    Hypertensive heart and kidney disease without heart failure and with stage 2 chronic kidney disease I13.10, N18.2    Chronic hypokalemia E87.6    CKD (chronic kidney disease) stage 2, GFR 60-89 ml/min N18.2    Current use of aspirin Z79.82    On statin therapy due to risk of future cardiovascular event Z79.899    Type 2 diabetes mellitus with stage 2 chronic kidney disease  E11.22, N18.2    Pure hypercholesterolemia E78.00    Elevated prostate specific antigen (PSA) R97.20    Refusal of statin medication by patient Z48.34    First degree atrioventricular block by electrocardiogram I44.0    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 20X0    Leukocytosis D72.829    Iron deficiency anemia D50.9    Vitamin B12 anemia D51.9    Vitamin D deficiency E55.9    Secondary hyperparathyroidism of renal origin N25.80        CONSULTS CALLED: Nephrology (Dr. Sherman Neighbor)      PROCEDURES DONE:   1. Chest x-ray (PA-Lateral) (8/16/2019) showed a minimally blunted left posterior costophrenic angle could be due to either a tiny effusion or chronic pleural reaction/scarring; mild cardiomegaly; atherosclerosis.   2. X-ray of the right ankle (8/22/2019) showed no acute fracture or subluxation; advanced degenerative changes at the tibiotalar joint; old fracture fragment vs. accessory ossicle in the inferior aspect of the lateral malleolus; soft tissue swelling around the ankle. 3. Modified barium swallow (8/27/2019) showed silent aspiration of thin liquid; no drew penetration or aspiration with other tested consistencies. BRIEF HISTORY: The patient is a 76-year-old, left-handed, White, obese male with multiple medical comorbidities who was admitted to [facility] on 8/12/2019 due to right leg weakness, ataxia, slurred speech and right sided facial droop. The patient was apparently well until the day prior to admission, [place] zoo, the patient was noted to have slurred speech. After ~15 minutes, the slurred speech resolved. Patient did not seek any medical attention. The evening prior to admission, the patient proceeded to a scheduled sleep study. The patient apparently was not able to sleep so the sleep study was terminated and he got home at ~4:00 AM on the morning of admission. He slept upon getting home and upon waking up at ~7:00 AM, the patient was noted to have slurred speech and weakness of the right arm and right leg resulting in walking towards the right side. The patient's wife brought the patient to the [facility] Emergency Department for further evaluation. The patient was seen and examined by Emergency Medicine (Dr. Justen Prasad).     Excerpt (Additional History) from the ED Provider Note by Dr. Justen Prasad: \"Deon Rios is a 71 y. o. male with hypertension who presents with slurred speech.  Patient and wife states that patient had slurred speech yesterday afternoon.  She thought it was induced from heat.  It resolved a couple minutes later.  Patient felt better throughout the day. Jean Victor had a sleep study in the hospital.  Sleep study ended at 4 AM.  Patient went home went to sleep woke up at 7 AM. Jaguar Moss states about 10 minutes later he had a second episode of slurred speech and according to the wife, he was walking sideways to the right.  Patient denies chest pain, nausea, vomiting or diarrhea.  No symptoms at this time.  Patient denies smoking alcohol or recreational drug use.  Initial blood sugar 174. \"     CT scan of the head (8/12/2019) showed no acute intracranial pathology; no acute hemorrhage; mild chronic small vessel ischemic change is noted.       [facility] TeleNeurology (Dr. Adelaida Boykin) evaluated the patient and intravenous tPA was recommended. The patient was given intravenous tPA. The patient tolerated the procedure well with no intraoperative complications.     K (0/19/4801) = 2.4  BUN/Creatinine (8/12/2019) = 26/1.54     CT angiogram of the head and neck (8/12/2019) showed patent intra- and extracranial cerebral arteries; no evidence of high-grade stenosis or acute dissection; there is atherosclerotic disease with mild stenosis of the left MCA at the left trifurcation; other incidental findings.     The patient was admitted to the ICU under the service of Critical Care Medicine (Dr. Agustina Brambila).     Excerpt (History of the Present Illness) from the H&P by Sharron Steve PA-C:  \"Patient is a 73 y. o. male with pmhx of HTN who presented to the ED for evaluation of right leg weakness, ataxia, slurred speech and right sided facial droop. Per patient's wife, yesterday around 2 pm while outside at the zoo the patient began experiencing the above stated symptoms. She states that she got him into the car and gave him some water and after approximately 10 minutes his symptoms resolved. She states that he had a sleep study over night last night and returned home at 4 am this morning. She states that at that time he was asymptomatic.  She reports around 7 am when she and the patient woke up and got out of bed he was exhibiting slurred speech, right sided facial droop, ataxia and right leg weakness again and was brought to the ED.  Patient evaluated by teleneurology. CT head completed showing no acute intracranial process. Teleneuro recommended tPA which was administered at approximately 0900. Patient was started on a nicardipine gtt for tighter blood pressure control as his SBP >200. CTA head and neck completed w/ formal read pending. Upon arrival to the ICU, patient A&O x3 w/ slurred speech, facial droop and new onset right arm weakness. Patient states that it started approximately an hour PTA to the ICU. On nicardipine gtt. Denies dizziness, lightheadedness, blurred vision, chest pain, abdominal pain, SOB, N/V. Notable labs include K+ of 2.4, Cr of 1.54, and troponin of 0.04.\"     SCD's were used for DVT prophylaxis.     MRI of the brain (8/12/2019) showed a small region of acute/early subacute infarct at the left posterior basal ganglia to corona radiata; no hemorrhage or mass effect.     Neurology consult (Dr. Lonnie Reed) was called for evaluation and comanagement of the stroke.     K (8/12/2019, 11:28 PM) = 2.4     Lipid profile (8/13/2019) showed , , HDL 42,      HbA1c (8/13/2019) = 6.6     Repeat CT scan of the head (8/13/2019) showed a 2.5 x 1.6 cm infarct in the left basal ganglia; no acute intracranial hemorrhage; no significant mass effect or midline shift.     K (8/13/2019) = 2.8  BUN/Creatinine (8/13/2019) = 26/1.32     On 8/13/2019, patient was transferred out of the ICU and service was transferred to the [facility] (Dr. Lalita Guthrie).     On 8/13/2019, unfractionated heparin was started for DVT prophylaxis. Patient was given a total of 60 meq of Potassium chloride IV and 40 meq of Potassium chloride PO.  Patient was resumed on Amlodipine 10 mg PO once daily, Metoprolol succinate 50 mg PO once daily and Gemfibrozil 600 mg PO BID (home medications).      As per note of Neurology (Dr. Lonnie Reed), patient refuses statin agent because of potential side effects.      K (8/14/2019, 3:42 AM) = 2.7  BUN/Creatinine (8/14/2019, 3:42 AM) = 28/1.39     Lipid profile (8/14/2019) showed , , HDL 39, LDL 94     On 8/14/2019, the patient was started on Aspirin 325 mg PO once daily. As per the note of Xi Souza NP, patient was agreeable to start a statin. Gemfibrozil was discontinued and Atorvastatin 80 mg PO q HS was started by Dr. Hannah Kelly. Hydralazine 50 mg PO q 8 hr was added for BP control. Patient received a total of 70 meq of Potassium chloride IV.     Modified barium swallow (8/14/2019) showed silent tracheal aspiration of thin liquids; laryngeal penetration of nectar consistency; no drew penetration or aspiration with other tested consistencies.     Patient was started on a mechanical soft diet with nectar-thick liquids.     K (8/14/2019, 6:48 PM) = 2.8  BUN/Creatinine (8/14/2019, 6:48 PM) = 26/1.31     K (8/15/2019) = 3.0  BUN/Creatinine (8/15/2019) = 23/1. 12     On the morning of 8/15/2019, the patient was noted to have a nosebleed. Neurology had seen the patient had he recommended to decrease the Aspirin dose from 325 mg to 81 mg PO once daily. Patient received 40 meq of Potassium chloride PO and 10 meq of Potassium chloride IV.     The patient had remained hemodynamically stable but due to the abovementioned neurologic deficit, the patient was noted to have impaired mobility and ADLs. Patient was felt to be a good candidate for acute inpatient rehabilitation. Upon evaluation by Physical Therapy and Occupational Therapy, the patient was recommended for acute inpatient rehabilitation.      Serum potassium had remained low during the entire hospital stay (ranging from 2.4 to 3.0) despite supplementation. Lab results in [facility] shows that prior to admission to [facility], the patient has had a chronically low potassium (since 2009!) and difficult to control hypertension.  Patient and his wife had said that the patient has had high blood pressures for more than 23 years and that he always has muscle cramps in his legs. Patient and wife stated that no matter what BP medication was prescribed, his blood pressure was difficult to control and did not respond. Patient and wife had always been mentioning the elevated blood pressure every time they visit their PCP. The wife stated the patient had been dealing with low potassium for a long time. On 2/7/2019, the patient changed PCP from Alysia Galvin NP to Cari Del Valle NP. Upon visit to his PCP Cari Del Valle NP) on 6/20/2019, a renal ultrasound was ordered to rule out renal artery stenosis. Duplex ultrasound of the renal arteries and veins done 7/8/2019 showed no evidence for hemodynamically significant (>60%) stenosis identified in either renal artery; elevated resistive indices present in both kidneys, consistent with parenchymal disease; bilateral patent renal veins.     Due to the chronically low potassium and elevated blood pressure, I called Krystina Womack to discuss the possibility of Primary hyperaldosteronism as the etiology of the persistently low potassium (despite correction) and uncontrolled hypertension (most likely secondary). She discussed this with her attending physician (Dr. Ines Cortes) and they ordered an Endocrinology consult (Dr. Aysha Kaufman) for evaluation and management. Patient was cleared for discharge/transfer to inpatient rehabilitation.     The patient was discharged and was subsequently admitted to the [facility] for intensive rehabilitation to help recover strength, function and mobility. COURSE IN THE HOSPITAL: Upon admission to the [facility], the patient underwent physical therapy, occupational therapy and speech therapy. The patient was able to actively participate in the rehabilitation activities and progressed well. On discharge, the patient was able to perform the following activities:    1. Occupational Therapy    ON ADMISSION ON DISCHARGE   Eating  Functional Level: 5   Eating  Functional Level: 5     Grooming  Functional Level: 3   Grooming  Functional Level: 5     Bathing  Functional Level: 2   Bathing  Functional Level: 4     Upper Body Dressing  Functional Level: 4   Upper Body Dressing  Functional Level: 5     Lower Body Dressing  Functional Level: 2   Lower Body Dressing  Functional Level: 4     Toileting  Functional Level: 2   Toileting  Functional Level: 2     Toilet Transfers  Toilet Transfer Score: 1   Toilet Transfers  Toilet Transfer Score: 3     Tub /Shower Transfers  Tub/Shower Transfer Score: 0   Tub/Shower Transfers  Tub/Shower Transfer Score: 4       2.  Physical Therapy    ON ADMISSION ON DISCHARGE   Wheelchair Mobility/Management  Able to Propel (ft): 300 feet  Functional Level: 5  Curbs/Ramps Assist Required (FIM Score): 0 (Not tested)  Wheelchair Setup Assist Required : 3 (Moderate assistance)(for right brake and leg rest)  Wheelchair Management: Manages left brake Wheelchair Mobility/Management  Able to Propel (ft): 300 feet  Functional Level: 5  Curbs/Ramps Assist Required (FIM Score): 4 (Minimal assistance)  Wheelchair Setup Assist Required : (assistance with right LE)  Wheelchair Management: Manages left brake, Manages right brake     Gait  Amount of Assistance: 2 (Maximal assistance)  Distance (ft): 3 Feet (ft)  Assistive Device: Cane, quad Gait  Amount of Assistance: 4 (Minimal assistance)  Distance (ft): 30 Feet (ft)(20)  Assistive Device: Cane, quad     Balance-Sitting/Standing  Sitting - Static: Good (unsupported)  Sitting - Dynamic: Fair (occasional)  Standing - Static: (poor+)  Standing - Dynamic : Impaired Balance-Sitting/Standing  Sitting - Static: Good (unsupported)  Sitting - Dynamic: Fair (occasional)  Standing - Static: (fair-)  Standing - Dynamic : Impaired     Bed/Mat Mobility  Rolling Right : 6 (Modified independent)  Rolling Left : 3 (Moderate assistance )  Supine to Sit : 3 (Moderate assistance)  Sit to Supine : 3 (Moderate assistance) Bed/Mat Mobility  Rolling Right : 0 (Not tested)  Rolling Left : 0 (Not tested)  Supine to Sit : 4 (Contact guard assistance)  Sit to Supine : 4 (Minimal assistance)     Transfers  Transfer Type: (stand step without assistive device)  Other: stand step txfr without AD  Transfer Assistance : 2 (Maximal assistance)  Sit to Stand Assistance: Maximum assistance  Car Transfers: Not tested Transfers  Transfer Type: Other  Other: stand step without assistive device  Transfer Assistance : 4 (Minimal assistance)  Sit to Stand Assistance: Minimal assistance(with additional time and facilitation at posterior thigh)  Car Transfers: Not tested     Steps or Stairs  Steps/Stairs Ambulated (#): 0  Level of Assist : 0 (Not tested) Steps or Stairs  Steps/Stairs Ambulated (#): 0  Level of Assist : 0 (Not tested)       3.  Speech and Language Pathology    ON ADMISSION ON DISCHARGE   Comprehension (Native Language)  Primary Mode of Comprehension: Auditory  Score: 4 Comprehension (Native Language)  Primary Mode of Comprehension: Auditory  Score: 5     Expression (Native Language)  Primary Mode of Expression: Verbal  Score: 4  Comments: Pt requiring increased time and repetitions to make needs known 2/2 to facial droop impeding expression    Expression (Native Language)  Primary Mode of Expression: Verbal  Score: 5  Comments: Pt requiring increased time and repetitions to make needs known 2/2 to facial droop impeding expression      Social Interaction/Pragmatics  Score: 4  Comments: Pt interacting appropriately with OT  Social Interaction/Pragmatics  Score: 5  Comments: Pt interacting appropriately with OT      Problem Solving  Score: 4  Comments: Pt requiring Michael for problem solving body mechanics prior to toileting transfer    Problem Solving  Score: 4  Comments: Pt requiring Michael for problem solving body mechanics prior to toileting transfer Memory  Score: 4 Memory  Score: 5       Legend:   7 - Independent   6 - Modified Independent   5 - Standby Assistance / Supervision / Set-up   4 - Minimum Assistance / Contact Guard Assistance   3 - Moderate Assistance   2 - Maximum Assistance   1 - Total Assistance / Dependent       ACUTE MEDICAL ISSUES ADDRESSED IN INPATIENT REHABILITATION FACILITY:     > Acute Ischemic Stroke (acute/early subacute infarct at the left posterior basal ganglia to corona radiata) with residual right hemiparesis, dysphagia and dysarthria; S/P Administration of intravenous tPA (8/12/2019 - [address])   > On 8/22/2019, started Baclofen 5 mg PO TID   > On 8/27/2019, increased Baclofen from 5 mg to 10 mg PO TID   > Continue:    > Aspirin 81 mg PO once daily with breakfast    > Atorvastatin 80 mg PO q HS    > Baclofen 10 mg PO TID    > Chronic hypokalemia    Serum Potassium during hospital stay at [address]      08/15/19  0330 08/14/19  1848 08/14/19  0342 08/13/19  1205 08/12/19  2328   K 3.0* 2.8* 2.7* 2.8* 2.4*          > K (8/16/2019, on admission) = 3.8   > Mg (8/16/2019, on admission) = 1.9   > Upon admission to the ARU, patient was given Klor Con 40 meq PO BID with meals x 2 days   > K (8/17/2019) = 3.5   > K (8/18/2019) = 3.7   > K (8/19/2019) = 3.5   > K (8/22/2019) = 3.6   > Mg (8/22/2019) = 2.2   > On 8/22/2019, started Spironolactone 50 mg PO BID   > K (8/24/2019) = 3.8   > K (8/26/2019) = 4.4   > K (8/28/2019) = 4.6   > Mg (8/28/2019) = 2.3   > On 8/28/2019, discontinued Mg(OH)2 400 mg PO once daily   > K (8/29/2019) = 4.1   > K (9/2/2019) = 4.6   > K (9/5/2019) = 4.6   > Continue Spironolactone 50 mg PO BID    > Hypertensive heart and kidney disease without heart failure and with stage 2 chronic kidney disease   > Upon admission to the ARU, increased Hydralazine from 50 mg to 75 mg PO q 8 hr (6AM, 2PM, 10PM)   > Once work-up for primary hyperaldosteronism is complete, plan to start Spironolactone 25 mg PO once daily   > On 8/16/2019:    > Discontinued Metoprolol succinate 50 mg PO once daily (6AM)    > Started:     > Labetalol 200 mg PO q 12 hr (9AM, 9PM)     > Isosorbide dinitrate 10 mg P) q 8 hr (6AM, 2PM, 10PM)   > On 8/18/2019, added Hydralazine 25 mg PO TID PRN for SBP greater than 170 mmHg   > On 8/19/2019:    > Increased Labetalol from 200 mg to 300 mg PO q 12 hr (9AM, 9PM)    > Increased Isosorbide dinitrate from 10 mg to 20 mg PO q 8 hr (6AM, 2PM, 10PM)   > On 8/20/2019:    > Increased Labetalol from 300 mg to 400 mg PO q 12 hr (9AM, 9PM)    > Increased Isosorbide dinitrate from 20 mg to 30 mg PO q 8 hr (6AM, 2PM, 10PM)   > On 8/22/2019:    > Increased Labetalol from 400 mg to 600 mg PO q 12 hr (9AM, 9PM)    > Started Spironolactone 50 mg PO BID   > On 8/27/2019, increased Hydralazine from 75 mg to 100 mg PO q 8 hr (6AM, 2PM, 10PM)   > On 8/28/2019, decreased Hydralazine from 100 mg to 75 mg PO q 8 hr (6AM, 2PM, 10PM)   > On 8/29/2019, Labetalol 600 mg PO q 12 hr (changed from 9AM, 9PM to 6AM, 6PM)   > Plan to add ACE-I or ARB for proteinuria once renal function stable and serum creatinine and serum potassium are no longer rising upwards   > Continue:    > Amlodipine 10 mg PO once daily (9AM)    > Hydralazine 75 mg PO q 8 hr (6AM, 2PM, 10PM)    > Labetalol 600 mg PO q 12 hr (6AM, 6PM)    > Isosorbide dinitrate 30 mg PO q 8 hr (6AM, 2PM, 10PM)    > Spironolactone 50 mg PO BID (9AM, 9PM)    > Hydralazine 25 mg PO TID PRN for SBP greater than 170 mmHg    > Pure hypercholesterolemia   > Lipid profile (8/13/2019) showed , , HDL 42,    > Lipid profile (8/14/2019) showed , , HDL 39, LDL 94   > Continue Atorvastatin 80 mg PO q HS    > Type 2 diabetes mellitus with stage 2 chronic kidney disease   > HbA1c (4/16/2014) = 6.2   > Patient has had chronic kidney disease even before his diagnosis of Type 2 diabetes mellitus and is presumed to be due to prolonged uncontrolled hypertension   > HbA1c (8/13/2019) = 6.6   > Continue Humalog insulin sliding scale SC TID AC    >  ? Primary hyperaldosteronism (ruled out) as etiology of chronic hypokalemia and difficult to control hypertension   > Endocrinology consult (Dr. Lindsey Mayfield) was called at [facility] prior to discharge/transfer to the ARU   > Aldosterone/Renin activity (8/16/2019):    > Aldosterone = 8.8 (NORMAL)    > Renin Activity = 0.300 (NORMAL)    > Aldosterone/Renin Activity = 29 (ELEVATED) ~ EQUIVOCAL, requires confirmation    > Last intake of Losartan was on 8/11/2019 (prior to admission)    > Endocrinology consult (Dr. Mejia Son):    > ASSESSMENT:     1. Chronic hypokalemia. 2.  Diabetes mellitus. 3.  Diabetic nephropathy with a stage II chronic renal disease. 4.  Normocytic anemia. 5.  Vitamin B12 deficiency. 6.  Borderline iron deficiency. 7.  Hypoalbuminemia.     > DISCUSSION:  The patient may well have hyperaldosteronism, but he may have it on the basis of his nephropathy, which is due to his underlying diabetes. The cause of his anemia might be a combination of B12 deficiency and iron deficiency together, so investigation should be done for this.     > PLAN: Laboratory studies:     1.  24-hour urine for aldosteronism (8/20/2019) = 12.78 (N.V. = 0-19) (NORMAL)     2. Methylmalonic acid level (8/18/2019) = 468 (ELEVATED)     3.  Gastrin level (8/20/2019) = <10 (NORMAL)     4. Microalbumin-to-creatinine ratio. 5.  Transferrin and prealbumin levels. > FSH (8/19/2019) = 5.6 (NORMAL)    > LH (8/18/2019) = 6.8 (NORMAL)    > Free T4 (8/18/2019) = 1.0 (NORMAL)    > TSH (8/18/2019) = 4.63 (ELEVATED)    > Urine aldosterone (8/20/2019): 11.1 ug/L    > Transferrin (8/22/2019) = 275   > Excerpt from the Progress Note (dated 8/22/2019) by Dr. Mejia Son:    > \"Rainer level is not elevated. 24 hour urine griffin is pending. The aldosterone/renin level is not high. If he has primary aldosteronism, it is very likely to be idiopathic rather an an aldosterone producing adenoma. This is susceptible to medical therapy. Will start spironolactone at 50 mg twice daily. \"    > On 8/22/2019, started Spironolactone 50 mg PO BID   > Unable to proceed with plasma aldosterone confirmatory tests as patient had been started on high-dose Spironolactone; unable to determine presence of adrenal adenoma in the absence of imaging studies   > Patient does not have Primary hyperaldosteronism as per Endocrinology   > Continue Spironolactone 50 mg PO BID    > Leukocytosis, resolved   > WBC count (8/15/2019) = 11.7   > No fever documented since admission to the ARU   > WBC count (8/16/2019, on admission) = 17.9   > Work-up:    > Urinalysis (8/16/2019): WNL    > Chest x-ray (PA-Lateral) (8/16/2019) showed a minimally blunted left posterior costophrenic angle could be due to either a tiny effusion or chronic pleural reaction/scarring; mild cardiomegaly; atherosclerosis.    > WBC count (8/18/2019) = 11.6    > Iron deficiency anemia / Vitamin B12 anemia   > Anemia work-up (8/14/2019) showed serum iron 22, TIBC 371, iron % saturation 6, ferritin 34, reticulocyte count 1.4   > Vitamin B12 (8/14/2019) = 208   > Hgb/Hct (8/15/2019) = 10.1/31.3    > Hgb/Hct (8/16/2019, on admission) = 10.7/32.6   > Hgb/Hct (8/18/2019) = 8.7/27.4   > Hgb/Hct (8/19/2019) = 8.4/25.2   > On 8/19/2019, started:     > FeSO4 325 mg PO once daily with breakfast     > Ascorbic Acid 250 mg PO once daily with breakfast (to enhance the absorption of the FeSO4)     > Cyanocobalamin 1,000 mcg PO once daily    > Epoetin tone 10,000 units SC x 1 dose   > Hgb/Hct (8/22/2019) = 8.6/26.3   > On 8/22/2019, Epoetin tone 10,000 units SC x 1 dose     > Hgb/Hct (8/26/2019) = 8.2/25.6   > On 8/26/2019, patient was given Epoetin tone 10,000 units SC x 1 dose     > On 8/27/2019, started Iron sucrose 50 mg IV q 24 hr   > On 8/28/2019:    > Discontinued:     > FeSO4 325 mg PO once daily with breakfast      > Ascorbic Acid 250 mg PO once daily with breakfast (to enhance the absorption of the FeSO4)     > Increased Iron sucrose from 50 mg to 200 mg IV q 24 hr   > Hgb/Hct (8/29/2019) = 8.0/25.5    > Vitamin B12 (8/29/2019) =  409 (from 208 on 8/14/2019)   > On 8/29/2019, discontinued Iron sucrose 200 mg IV q 24 hr   > On 8/30/2019, resumed FeSO4 325 mg PO once daily with breakfast    > On 9/2/2019:    > Discontinued FeSO4 325 mg PO once daily with breakfast     > Resumed Iron sucrose 200 mg IV q 24 hr x 3 doses   > Hgb/Hct (9/5/2019) = 8.7/27.2    > On 9/5/2019, resumed:    > FeSO4 325 mg PO once daily with breakfast     > Ascorbic Acid 250 mg PO once daily with breakfast (to enhance the absorption of the FeSO4)    > Continue:    > Cyanocobalamin 1,000 mcg PO once daily    > FeSO4 325 mg PO once daily with breakfast     > Ascorbic Acid 250 mg PO once daily with breakfast (to enhance the absorption of the FeSO4)     > Vitamin D Deficiency   > PTH (8/18/2019) = 101.7   > Vitamin D 25-Hydroxy (8/19/2019) = 16.2   > On 8/19/2019, patient was given Cholecalciferol 50,000 units PO x 1 dose    > On 8/20/2019, started Cholecalciferol 5,000 units PO once daily   > Continue Cholecalciferol 5,000 units PO once daily    > Right ankle swelling, most likely dependent edema due to hemiparesis/lack of muscle contraction and osteoarthritis of the right ankle   > (+) nonpainful swelling of the right ankle for the past few days; denies any trauma   > X-ray of the right ankle (8/22/2019) showed no acute fracture or subluxation; advanced degenerative changes at the tibiotalar joint; old fracture fragment vs. accessory ossicle in the inferior aspect of the lateral malleolus; soft tissue swelling around the ankle.   > Elevate right leg/foot when supine in bed    > Stage 2 chronic kidney disease --> Acute kidney injury superimposed on CKD stage 2 VS progression to Stage 3 chronic kidney disease   > On admission, estimated GFR based on a Creatinine of 1.20 mg/dl:               > Using CKD-EPI = 63.1 mL/min/1.73m2               > Using MDRD = 64.6 mL/min/1.73m2   > Estimated GFR, based on a Creatinine of 1.49 mg/dl on 8/26/2019:    > Using CKD-EPI = 48.6 mL/min/1.73m2    > Using MDRD = 57.4 mL/min/1.73m2    > Urine creatinine (8/26/2019) = 31.00   > Microalbumin, urine (8/26/2019) = 56.00   > Microalbumin/Creatinine ratio (8/26/2019) = 1806   > Nephrology consult (Dr. Abbey Wu) for evaluation and comanagement    > Impression:     > Chronic kidney disease stage III with heavy proteinuria, diabetic nephropathy     > Proteinuria, diabetes related, not on any antiproteinuric medication      > Hypertension, well controlled     > Diabetes     > Recent acute ischemic stroke     > Chronic hypokalemia, on spiranolactone     > Anemia     > Secondary hyperparathyroidism    > Plan:     > I will start him on IV Venofer, side effects discussed. Plan to start on Epogen once his iron store is repleted. > Continue spiranolactone, monitor potassium.      > Monitor renal function for now, will add ACE inhibitor for protienuria in near future. > Adjust medication for current renal function status.    > BUN/Creatinine (8/28/2019) = 37/1.69    > BUN/Creatinine (8/29/2019) = 36/1.54    > BUN/Creatinine (9/2/2019) = 39/1.69    > BUN/Creatinine (9/5/2019) = 37/1.69     > Diet:   > On 8/16/2019, solids consistency was advanced from Mechanical soft (NDD 2) to Soft solids (NDD 3)   > Modified barium swallow (8/27/2019) showed silent aspiration of thin liquid; no drew penetration or aspiration with other tested consistencies. > Specifications: Diabetic, low saturated fat   > Solids (consistency):  Soft solids (NDD 3)   > Liquids (consistency): Mildly thick (Nectar-thick)        MEDICATIONS ON DISCHARGE:    Current Discharge Medication List START taking these medications    Details   aspirin 81 mg chewable tablet Take 1 Tab by mouth daily (with breakfast). Indications: stroke prevention  Qty: 30 Tab, Refills: 0    Associated Diagnoses: Acute ischemic stroke ([address] Current use of aspirin      atorvastatin (LIPITOR) 80 mg tablet Take 1 Tab by mouth nightly. Indications: excessive fat in the blood, stroke prevention  Qty: 30 Tab, Refills: 0    Associated Diagnoses: Acute ischemic stroke ([address] On statin therapy due to risk of future cardiovascular event; Pure hypercholesterolemia; Refusal of statin medication by patient      baclofen (LIORESAL) 10 mg tablet Take 1 Tab by mouth three (3) times daily for 30 days. Qty: 90 Tab, Refills: 0    Associated Diagnoses: Right hemiparesis (HCC)      cholecalciferol (VITAMIN D3) 5,000 unit capsule Take 1 Cap by mouth daily. Indications: vitamin D deficiency  Qty: 30 Cap, Refills: 0    Associated Diagnoses: Vitamin D deficiency      cyanocobalamin 1,000 mcg tablet Take 1 Tab by mouth daily. Indications: Vitamin B12 deficiency  Qty: 30 Tab, Refills: 0    Associated Diagnoses: Anemia due to vitamin B12 deficiency, unspecified B12 deficiency type      isosorbide dinitrate (ISORDIL) 30 mg tablet Take 1 Tab by mouth every eight (8) hours. Indications: Hypertension  Qty: 80 Tab, Refills: 0    Associated Diagnoses: Hypertensive heart and kidney disease without heart failure and with stage 2 chronic kidney disease      labetalol (NORMODYNE) 300 mg tablet Take 2 Tabs by mouth every twelve (12) hours. Indications: high blood pressure  Qty: 120 Tab, Refills: 0    Associated Diagnoses: Hypertensive heart and kidney disease without heart failure and with stage 2 chronic kidney disease      spironolactone (ALDACTONE) 50 mg tablet Take 1 Tab by mouth two (2) times a day.  Indications: aldosteronism, high blood pressure, prevention of low potassium in the blood  Qty: 60 Tab, Refills: 0    Associated Diagnoses: Hypertensive heart and kidney disease without heart failure and with stage 2 chronic kidney disease         CONTINUE these medications which have CHANGED    Details   hydrALAZINE (APRESOLINE) 25 mg tablet Take 3 Tabs by mouth every eight (8) hours. Indications: high blood pressure  Qty: 270 Tab, Refills: 0    Associated Diagnoses: Hypertensive heart and kidney disease without heart failure and with stage 2 chronic kidney disease         CONTINUE these medications which have NOT CHANGED    Details   amLODIPine (NORVASC) 10 mg tablet Take 10 mg by mouth daily. STOP taking these medications       potassium chloride SR (KLOR-CON 10) 10 mEq tablet Comments:   Reason for Stopping:         metoprolol succinate (TOPROL-XL) 50 mg XL tablet Comments:   Reason for Stopping:         losartan-hydroCHLOROthiazide (HYZAAR) 100-25 mg per tablet Comments:   Reason for Stopping:         gemfibrozil (LOPID) 600 mg tablet Comments:   Reason for Stopping:         ALLOPURINOL PO Comments:   Reason for Stopping:           Estimated Glomerular Filtration Rate:  On admission, estimated GFR based on a Creatinine of 1.20 mg/dl:              Using CKD-EPI = 63.1 mL/min/1.73m2              Using MDRD = 64.6 mL/min/1.73m2  Most recent estimated GFR, based on a Creatinine of 1.69 mg/dl on 9/5/2019:              Using CKD-EPI = 41.7 mL/min/1.73m2              Using MDRD = 43.5 mL/min/1.73m2       DISCHARGE VITAL SIGNS:  Visit Vitals  /62   Pulse 69   Temp 98 °F (36.7 °C)   Resp 18   Ht 5' 9\" (1.753 m)   Wt 102 kg (224 lb 14.4 oz)   SpO2 97%   BMI 33.21 kg/m²       DISCHARGE PHYSICAL EXAMINATION:  GENERAL SURVEY: Patient is awake, alert, oriented x 3, sitting comfortably on the chair, not in acute respiratory distress.   HEENT: pink palpebral conjunctivae, anicteric sclerae, no nasoaural discharge, moist oral mucosa  NECK: supple, no jugular venous distention, no palpable lymph nodes  CHEST/LUNGS: symmetrical chest expansion, good air entry, clear breath sounds  HEART: adynamic precordium, good S1 S2, no S3, regular rhythm, no murmurs  ABDOMEN: obese, bowel sounds appreciated, soft, non-tender  EXTREMITIES: pink nailbeds, no edema except for right ankle swelling, full and equal pulses, no calf tenderness   NEUROLOGICAL EXAM: The patient is awake, alert and oriented x 3, able to answer questions fairly appropriately, able to follow 1 and 2 step commands.  Able to tell time from the wall clock.  Cranial nerves II-XII are grossly intact except for slightly shallow right nasolabial fold, dysarthria and dysphagia.  No gross sensory deficit.  Motor strength is 5/5 on the LUE and LLE, 0/5 on the RUE (except for 1/5 on the right shoulder and some fingers of the right hand), 2+/5 on RLE (except for 0/5 on the right ankle/foot). CONDITION ON DISCHARGE: Stable. DISPOSITION: Patient clinically improved and was discharged / [facility] to [id number] to continue his rehabilitation.       FOLLOW-UP RECOMMENDATIONS:   Follow-up Information     Follow up With Specialties Details Why Contact Info    [address] [facility] [address]      Ruiz Garza NP Nurse Practitioner Schedule an appointment as soon as possible for a visit In 1 to 2 weeks after discharge from the skilled nursing facility [address] [place]      [facility] Pr[facility] Nephrology Schedule an appointment as soon as possible for a visit In 1 to 2 weeks after discharge from the skilled nursing facility [address] [address] [place], [place] [id number] [facility]      Jason Ann MD Neurology Schedule an appointment as soon as possible for a visit In 1 to 2 weeks after discharge from the skilled nursing facility [address] [facility] [address]  [phone]            OTHER INSTRUCTIONS:  1. Diet.    > Specifications: Diabetic, low saturated fat   > Solids (consistency): Soft solids (NDD 3)   > Liquids (consistency): Mildly thick (Nectar-thick)   2. Activity. As tolerated. 3. Safety / fall precautions. 4. Aspiration precautions. 5. Will need to monitor serum potassium levels and renal function closely as patient is at high risk for hyperkalemia and worsening of renal function. TIME SPENT ON DISCHARGE ACTIVITIES: More than 30 minutes.       Signed:  Justine Baltazar MD    9/5/2019

## 2019-09-05 NOTE — ROUTINE PROCESS
0800 PT. Awake sitting up in bed no change in assessment no signs of distress  Pt. Stated to be feeling fine. 0930 Pt. Sitting up in chair eating breakfast.   1200 no change in assessment,. Report called to [facility] center plan for  at 1330.  1340 Medical transport arrived and transferred pt. By stretcher.

## 2019-09-05 NOTE — PROGRESS NOTES
Problem: Self Care Deficits Care Plan (Adult)  Goal: *Therapy Goal (Edit Goal, Insert Text)  Description  Occupational Therapy Goals   Short Term Goals=Long Term Goals  Initiated 2019 and to be accomplished within 4 week(s) (Updated 2019)  1. Pt will perform self-feeding with MI.  2. Pt will perform grooming with Set-up. ()  3. Pt will perform UB bathing with SBA. 4. Pt will perform LB bathing with Luba/CGA. (; upgrade to SBA)  5. Pt will perform tub/shower transfer with Luba/CGA using LRAD. ()  6. Pt will perform UB dressing with Set-up. (; upgrade to MI)  7. Pt will perform LB dressing with Luba/CGA. (; upgrade to SBA)  8. Pt will perform toileting task with Luba/CGA. (; upgrade to SBA)  9. Pt will perform toilet transfer with Luba/CGA using LRAD. ()      Short Term Goals   Initiated 2019 and to be accomplished within 7 day(s)   1. Pt will perform self-feeding with S; using compensatory strategies for right hand incorporation into set-up. ()  2. Pt will perform simple grooming task, including denture mgmt, with Luba and compensatory strategies as needed. (; washing face- pt reporting spouse previously assisted with denture mgmt)  3. Pt will perform UB bathing with Luba using AE as needed. ()  4. Pt will perform LB bathing with ModA using AE and compensatory strategies as needed. ()  5. Pt will perform tub/shower transfer with modA x 1 <>TTB. ()  6. Pt will perform UB dressing with CGA. (GM)  7. Pt will perform LB dressing with ModA using AE as needed. (GM)  8. Pt will perform toileting task with ModA. ()  9. Pt will perform toilet transfer with ModA x 1 <>3:1 commode. ()         Outcome: Progressing Towards Goal   OCCUPATIONAL THERAPY TREATMENT    Patient: Indiana Miller   71 y.o.     Patient identified with name and : yes    Date: 2019    First Tx Session  Time In: 0930  Time Out[de-identified] 56      Diagnosis: Acute ischemic stroke [facility]) [I63.9]   Precautions: Aspiration  Chart, occupational therapy assessment, plan of care, and goals were reviewed. Pain:  Pt reports 0/10 pain or discomfort prior to treatment. Pt reports 0/10 pain or discomfort post treatment. Intervention Provided: NA      SUBJECTIVE:   Patient stated I appreciate all you have done for me.     OBJECTIVE DATA SUMMARY:   Pt approached for tx seated in w/c in therapy gym, pt agreeable. Pt participating in [address] from w/c level this AM. When prompted, pt reporting no further questions when educated on d/c to next level of care this afternoon. NMRE Daily Assessment   Pt performing NMRE to facilitate neuroplasticity, proprioception, and motor return to the right UE necessary for function and participation in self care tasks. Seated edge of w/c with high/low mat on right side of pt, pt performed WB'ing through right UE (elbow) on mat with crossing midline with left UE to cecily/doff pegs from vertical peg board x 32. With right UE wrapped via ace bandage on dominguez, pt performed bringing dominguez up 5 notch vertical ladder with emphasis on right shoulder elevation and maintaining even bilateral movements. Pt performed x 3 sets with OT (A) at right elbow jt and x 3 sets w/o OT (A) with increased time. Pt demo'ed WFL right trap elevation to perform task. Performed swiss ball forward flexion/ext with providing [address] through right UE with hand over hand placement 3 x 10 repetitions, challenge of holding final repetition x 10 seconds. Performed (B) elbows on large swiss ball performing x 10 repetitions of clockwise circles, x10 counterclockwise circles, and x 10 R<>L movements. With interlaced fingers pt performed forward flexion to retrieve 10x cones (1 at a time) on floor and place on elevated table anterior to pt. Pt required intermittant (A) at right elbow jt for success. Performed bringing cones back from table>floor x 10 cones w/o OT (A).           ASSESSMENT:  Pt demo'ing good motor initiation of shoulder elevation during ladder task. Pt continues to demo motivation to return to PLOF, at times requiring OT initiated rest breaks and cues to slow pace 2/2 to rushing through tasks and eagerness. Pt would benefit from continued monitoring at next level of care for potential right shoulder subluxation. Pt had rupture of (potentially, pt unable to confirm exact muscle) middle deltoid which may contribute. Pt and spouse continually educated on preventing right arm from being in hanging positions and awareness during functional transfers. Progression toward goals:  ?          Improving appropriately and progressing toward goals  ? Improving slowly and progressing toward goals  ? Not making progress toward goals and plan of care will be adjusted     PLAN:  Patient continues to benefit from skilled intervention to address the above impairments. Continue treatment per established plan of care. Discharge Recommendations:  Allen Witt  Further Equipment Recommendations for Discharge: Bariatric 3:1, TTB, w/c arm tray for right UE      Activity Tolerance:  Good       Estimated LOS: 9/5/19    Please refer to the flow sheet for vital signs taken during this treatment. After treatment:   ?  Patient left in no apparent distress sitting up in wheel chair for SLP hand off   ? Patient left in no apparent distress in bed  ? Call bell left within reach  ? Nursing notified  ? Caregiver present  ? Bed alarm activated    COMMUNICATION/EDUCATION:   ? Home safety education was provided and the patient/caregiver indicated understanding. ? Patient/family have participated as able in goal setting and plan of care. ? Patient/family agree to work toward stated goals and plan of care. ? Patient understands intent and goals of therapy, but is neutral about his/her participation. ? Patient is unable to participate in goal setting and plan of care.     Ruth Mcadams, OTR/L

## 2019-09-05 NOTE — ROUTINE PROCESS
SHIFT CHANGE NOTE FOR [facility]    Bedside and Verbal shift change report given to [id number] (oncoming nurse) by Lakia Polo LPN (offgoing nurse). Report included the following information SBAR, Kardex, MAR and Recent Results. Situation:   Code Status: Full Code   Reason for Admission: CVA  Hospital Day: 20   Problem List:   Hospital Problems  Date Reviewed: 9/4/2019          Codes Class Noted POA    Primary osteoarthritis of right ankle (Chronic) ICD-10-CM: M19.071  ICD-9-CM: 715.17  8/22/2019 Yes    Overview Signed 8/23/2019 11:54 AM by Eb Whitfield MD     X-ray of the right ankle (8/22/2019) showed no acute fracture or subluxation; advanced degenerative changes at the tibiotalar joint; old fracture fragment vs. accessory ossicle in the inferior aspect of the lateral malleolus; soft tissue swelling around the ankle.              Vitamin D deficiency (Chronic) ICD-10-CM: E55.9  ICD-9-CM: 268.9  8/19/2019 Yes    Overview Signed 8/19/2019  1:34 PM by Eb Whitfield MD     Vitamin D 25-Hydroxy (8/19/2019) = 16.2              Secondary hyperparathyroidism of renal origin ([facility] [address] (Chronic) ICD-10-CM: N25.81  ICD-9-CM: 588.81  8/18/2019 Yes    Overview Signed 8/28/2019  1:24 PM by Eb Whitfield MD     PTH (8/18/2019) = 101.7             Leukocytosis ICD-10-CM: A78.112  ICD-9-CM: 288.60  8/16/2019 Yes    Overview Signed 8/16/2019 11:19 AM by Eb Whitfield MD     WBC count (8/16/2019) = 17.9             Hypertensive heart and kidney disease without heart failure and with stage 2 chronic kidney disease (Chronic) ICD-10-CM: I13.10, N18.2  ICD-9-CM: 404.90, 585.2  Unknown Yes        Chronic hypokalemia ICD-10-CM: E87.6  ICD-9-CM: 276.8  Unknown Yes    Overview Addendum 8/15/2019 11:31 AM by Eb Whitfield MD     Persistent chronic hypokalemia + hypertension; ?primary hyperaldosteronism             Type 2 diabetes mellitus with stage 2 chronic kidney disease (HCC) (Chronic) ICD-10-CM: E11.22, N18.2  ICD-9-CM: 250.40, 585.2  Unknown Yes    Overview Signed 8/15/2019 12:26 PM by Xavier Whitehead MD     HbA1c (8/13/2019) = 6.6             Pure hypercholesterolemia (Chronic) ICD-10-CM: E78.00  ICD-9-CM: 272.0  8/15/2019 Yes    Overview Addendum 8/15/2019  6:27 PM by Xavier Whitehead MD     Lipid profile (8/13/2019) showed , , HDL 42, ; Lipid profile (8/14/2019) showed , , HDL 39, LDL 94             Current use of aspirin ICD-10-CM: Z79.82  ICD-9-CM: V58.66  8/14/2019 Yes        On statin therapy due to risk of future cardiovascular event ICD-10-CM: Z79.899  ICD-9-CM: V58.69  8/14/2019 Yes    Overview Signed 8/15/2019 12:21 PM by Xavier Whitehead MD     On Atorvastatin             Vitamin B12 deficiency anemia (Chronic) ICD-10-CM: D51.9  ICD-9-CM: 281.1  8/14/2019 Yes    Overview Signed 8/19/2019  1:24 PM by Xavier Whitehead MD     Vitamin B12 (8/14/2019) = 208             Iron deficiency anemia (Chronic) ICD-10-CM: D50.9  ICD-9-CM: 280.9  8/14/2019 Yes    Overview Signed 8/19/2019  1:25 PM by Xavier Whitehead MD     Anemia work-up (8/14/2019) showed serum iron 22, TIBC 371, iron % saturation 6, ferritin 34, reticulocyte count 1.4               Refusal of statin medication by patient ICD-10-CM: Z53.29  ICD-9-CM: V64.2  8/13/2019 Yes    Overview Signed 8/15/2019  2:01 PM by Xavier Whitehead MD     As per note of Neurology (Dr. Andie Lewis), patient refuses statin agent because of potential side effects.               * (Principal) Acute ischemic stroke [facility]) ICD-10-CM: I63.9  ICD-9-CM: 434.91  8/12/2019 Yes    Overview Signed 8/15/2019 12:17 AM by Xavier Whitehead MD     Acute Ischemic Stroke (acute/early subacute infarct at the left posterior basal ganglia to corona radiata) with residual right hemiparesis, dysphagia and dysarthria             Impaired mobility and ADLs ICD-10-CM: Z74.09  ICD-9-CM: 799.89  8/12/2019 Yes        Received intravenous tissue plasminogen activator (tPA) in emergency department ICD-10-CM: Z92.82  ICD-9-CM: V45.88  8/12/2019 Yes        Right hemiparesis ([facility] [address] ICD-10-CM: G81.91  ICD-9-CM: 342.90  8/12/2019 Yes        Dysphagia ICD-10-CM: R13.10  ICD-9-CM: 787.20  8/12/2019 Yes        Dysarthria ICD-10-CM: R47.1  ICD-9-CM: 784.51  8/12/2019 Yes              Background:   Past Medical History:   Past Medical History:   Diagnosis Date    Acute ischemic stroke ([facility] [address] 8/12/2019    Acute Ischemic Stroke (acute/early subacute infarct at the left posterior basal ganglia to corona radiata) with residual right hemiparesis, dysphagia and dysarthria    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     On Allopurinol    Chronic hypokalemia     Persistent chronic hypokalemia + hypertension; ?primary hyperaldosteronism    CKD (chronic kidney disease) stage 2, GFR 60-89 ml/min 8/15/2019    Current use of aspirin 8/14/2019    Dysarthria 8/12/2019    Dysphagia 8/12/2019    Elevated prostate specific antigen (PSA) 7/21/2011    First degree atrioventricular block by electrocardiogram 8/12/2019    Hypertensive heart and kidney disease without heart failure and with stage 2 chronic kidney disease     Iron deficiency anemia 8/14/2019    Anemia work-up (8/14/2019) showed serum iron 22, TIBC 371, iron % saturation 6, ferritin 34, reticulocyte count 1.4     Obesity, Class I, BMI 30-34.9     Obstructive sleep apnea on CPAP     On statin therapy due to risk of future cardiovascular event 8/14/2019    On Atorvastatin    Primary osteoarthritis of right ankle 8/22/2019    X-ray of the right ankle (8/22/2019) showed no acute fracture or subluxation; advanced degenerative changes at the tibiotalar joint; old fracture fragment vs. accessory ossicle in the inferior aspect of the lateral malleolus; soft tissue swelling around the ankle.     Pure hypercholesterolemia 08/15/2019    Lipid profile (8/13/2019) showed , , HDL 42, ; Lipid profile (8/14/2019) showed , , HDL 39, LDL 94  Received intravenous tissue plasminogen activator (tPA) in emergency department 8/12/2019    Refusal of statin medication by patient 8/13/2019    As per note of Neurology (Dr. Patricia Johnson), patient refuses statin agent because of potential side effects.  Right hemiparesis ([facility] [address] 8/12/2019    Secondary hyperparathyroidism of renal origin ([facility] [address] 8/18/2019    PTH (8/18/2019) = 101.7    Type 2 diabetes mellitus with stage 2 chronic kidney disease (HCC)     HbA1c (8/13/2019) = 6.6    Vitamin B12 deficiency anemia 8/14/2019    Vitamin B12 (8/14/2019) = 208    Vitamin D deficiency 8/19/2019    Vitamin D 25-Hydroxy (8/19/2019) = 16.2       Patient taking anticoagulants yes Heparin, ASA   Patient has a defibrillator: no     Assessment:   Changes in Assessment throughout shift: No     Patient has central line: no Reasons if yes: Insertion date: Last dressing date:   Patient has Boykin Cath: no Reasons if yes:     Insertion date:     Last Vitals:     Vitals:    09/04/19 0737 09/04/19 1346 09/04/19 1700 09/04/19 2026   BP: 143/63 144/63 152/70 141/65   Pulse: (!) 52 64 64 60   Resp: 19 19 18   Temp: 97.1 °F (36.2 °C)  98.9 °F (37.2 °C) 98 °F (36.7 °C)   SpO2: 97%  98% 97%   Weight:       Height:            PAIN    Pain Assessment    Pain Intensity 1: 0 (09/04/19 1625) Pain Intensity 1: 2 (12/29/14 1105)    Pain Location 1: Shoulder Pain Location 1: Abdomen    Pain Intervention(s) 1: Medication (see MAR) Pain Intervention(s) 1: Medication (see MAR)  Patient Stated Pain Goal: 0 Patient Stated Pain Goal: 0  o Intervention effective: no    o Other actions taken for pain:      Skin Assessment  Skin color Skin Color: Appropriate for ethnicity  Condition/Temperature Skin Condition/Temp: Dry, Warm  Integrity Skin Integrity: Intact  Turgor Turgor: Non-tenting  Weekly Pressure Ulcer Documentation  Pressure  Injury Documentation: No Pressure Injury Noted-Pressure Ulcer Prevention Initiated  Wound Prevention & Protection Methods  Orientation of wound Orientation of Wound Prevention: Posterior  Location of Prevention Location of Wound Prevention: Sacrum/Coccyx  Dressing Present Dressing Present : Yes  Dressing Status Dressing Status: Intact  Wound Offloading Wound Offloading (Prevention Methods): Bed, pressure reduction mattress     INTAKE/OUPUT  Date 09/03/19 1900 - 09/04/19 0659 09/04/19 0700 - 09/05/19 0659   Shift 5328-1695 24 Hour Total 8447-9974 1543-3714 24 Hour Total   INTAKE   P.O.  1560 960  960     P.O.  1560 960  960   Shift Total(mL/kg)  1560(15.3) 960(9.4)  960(9.4)   OUTPUT   Urine(mL/kg/hr) 700 850        Urine Voided 700 850        Urine Occurrence(s) 5 x 10 x 3 x 1 x 4 x   Stool 250 250        Stool Occurrence(s) 0 x 2 x 0 x 1 x 1 x     Stool 250 250      Shift Total(mL/kg) 950(9.3) 1100(10.8)      NET -950 460 960  960   Weight (kg) 102 102 102 102 102       Recommendations:  1. Patient needs and requests:     2. Diet: Cardiac Mech Soft Nectar Thick     3. Pending tests/procedures:      4. Functional Level/Equipment:     5. Estimated Discharge Date: 8/24/19 Posted on Whiteboard in Patients Room: yes     HEALS Safety Check    A safety check occurred in the patient's room between off going nurse and oncoming nurse listed above.     The safety check included the below items  Area Items   H  High Alert Medications - Verify all high alert medication drips (heparin, PCA, etc.)   E  Equipment - Suction is set up for ALL patients (with yanker)  - Red plugs utilized for all equipment (IV pumps, etc.)  - WOWs wiped down at end of shift.  - Room stocked with oxygen, suction, and other unit-specific supplies   A  Alarms - Bed alarm is set for fall risk patients  - Ensure chair alarm is in place and activated if patient is up in a chair   L  Lines - Check IV for any infiltration  - Boykin bag is empty if patient has a Boykin   - Tubing and IV bags are labeled   S  Safety   - Room is clean, patient is clean, and equipment is clean. - Hallways are clear from equipment besides carts. - Fall bracelet on for fall risk patients  - Ensure room is clear and free of clutter  - Suction is set up for ALL patients (with diann)  - Hallways are clear from equipment besides carts.    - Isolation precautions followed, supplies available outside room, sign posted

## 2019-09-05 NOTE — PROGRESS NOTES
Problem: Mobility Impaired (Adult and Pediatric)  Goal: *Acute Goals and Plan of Care (Insert Text)  Description  Physical Therapy Short Term Goals  Initiated 8/16/2019 and to be accomplished within 14 day(s)  1. Patient will move from supine to sit and sit to supine  in bed with minimal assistance/contact guard assist. -met 8/30/19  2. Patient will transfer from bed to chair and chair to bed with minimal/moderate assistance using the least restrictive device. - met 8/30/19  3. Patient will perform sit to stand with minimal assistance/moderate assistance. - met 8/30/19  4. Patient will ambulate with moderate assistance  for 10 feet with the least restrictive device. -met 8/30/19    Physical Therapy Short Term Goals  Initiated 8/30/2019 and to be accomplished within 7 day(s)  1. Patient will move from supine to sit and sit to supine  in bed with supervision/set-up. Met for supine to sit, min A for sit to supine 9/5/19   2. Patient will transfer from bed to chair and chair to bed with minimal assistance/contact guard assist using the least restrictive device. Met 9/5/19  3. Patient will perform sit to stand with minimal assistance/contact guard assist from w/c height. Met 9/5/19  4. Patient will ambulate with minimal assistance/contact guard assist for 60 feet with the least restrictive device. -partially met 40 feet CGA 9/5/19      Physical Therapy Goals  Initiated 8/16/2019 and to be accomplished within 4 weeks  1. Patient will move from supine to sit and sit to supine  in bed with supervision/set-up. -not met 9/5/19  2. Patient will transfer from bed to chair and chair to bed with supervision/set-up using the least restrictive device. -not met 9/5/19  3. Patient will perform sit to stand with supervision/set-up. -not met 9/5/19  4. Patient will ambulate with minimal assistance for 50 feet with the least restrictive device. - partially met 9/5/19  5.   Patient will ascend/descend 2 stairs with 1 handrail(s) with moderate assistance . - not met 19         Outcome: Resolved/Not Met   PHYSICAL THERAPY DISCHARGE NOTE    Patient: Clara Castrejon (89 y.o. male)  Date: 2019  Diagnosis: Acute ischemic stroke [facility]) [I63.9] Acute ischemic stroke [facility])  Precautions: Aspiration, NWB  Chart, physical therapy assessment, plan of care and goals were reviewed. Pain:  Pt pain was reported as  0 pre-treatment. Pt pain was reported as 0 post-treatment. Intervention: n/a    Time in:1135  Time out:1235    Pt seen for:gait training, transfer training, w/c mobility, therex, theract    Patient identified with name and :yes    SUBJECTIVE:     Patient stated:\"I'm doing good. \"    OBJECTIVE DATA SUMMARY:   AROM:     FIM SCORES Initial Assessment Discharge Assessment   Bed/Chair/Wheelchair Transfers 2 4   Wheelchair Mobility 5 6(on even surface)   Walking [place] 1 1   Steps/Stairs 0  0   PRIMARY MODE OF LOCOMOTION: wheelchair  Please see IRC Interdisciplinary Eval: Coordination/Balance Section for details regarding FIM score description.     BED/CHAIR/WHEELCHAIR TRANSFERS Initial Assessment Discharge Assessment   Rolling Right 6 (Modified independent) 6 (Modified independent)   Rolling Left 3 (Moderate assistance ) 4 (Contact guard assistance)   Supine to Sit 3 (Moderate assistance) 5 (Stand-by assistance)   Sit to Stand Maximum assistance Contact guard assistance   Sit to Supine 3 (Moderate assistance) 4 (Minimal assistance)(to raise right LE onto the mat)   Transfer Assist Score 2 4   Transfer Type (stand step without assistive device) (stand step without assistive device)   Comments       Car Transfer Not tested  NT   Car Type           WHEELCHAIR MOBILITY/MANAGEMENT Initial Assessment Discharge Assessment   Able to Propel 300 feet 300 feet   Functional Level 5 6(on even surface)   Curbs/ramps assistance required 0 (Not tested) 4 (Minimal assistance)   Wheelchair set up assistance required 3 (Moderate assistance)(for right brake and leg rest) 4 (Minimal assistance)   Wheelchair management Manages left brake Manages right brake;Manages left brake       WALKING INDEPENDENCE Initial Assessment Discharge Assessment   Assistive device Thereasa Delon, quad Thereasa Delon, quad   Ambulation assistance - level surface    Min A   Distance 3 Feet (ft) 43 Feet (ft)   Functional Level 1 1   Comments (3 feet max A with wide base quad cane)  Cues for proper muscle contraction and to prevent knee hyperextension; narrow base of support and  right hip external rotation with fatigue   Ambulation assistance - unlevel surface  NT  NT       STEPS/STAIRS Initial Assessment Discharge Assessment   Steps/Stairs ambulated 0     Rail Use       Functional Level 0  0   Comments       Curbs/Ramps           Neuro Re-Education:  Sit to stand x5 from w/c with CGA; w/c to mat transfer x6 with min A/CGA and verbal cues for proper timing and sequencing    Therapeutic Exercises:   Seated LE exercises on right x10 with tactile stimulation; supine LE exercises 2x10 with decreased assistance needed for proper body mechanics    ASSESSMENT:  Pt has progressed well towards PT goals however continued to require CGA/min A with transfers and ambulation. Pt is beginning to demonstrated increased control of right LE at hip and knee however fatigues quickly limiting ambulation distance. PLAN:  Pt would benefit from continued skilled physical therapy in order to improve independent functional mobility at SNF level. Interventions may include range of motion (AROM, PROM B LE/trunk), motor function (B LE/trunk strengthening/coordination), activity tolerance (vitals, oxygen saturation levels), bed mobility training, balance activities, gait training (progressive ambulation program), and functional transfer training.      Discharge Recommendations:  Allen Witt  Further Equipment Recommendations for Discharge:  N/A       Activity Tolerance:   Fair-  Please refer to the flowsheet for vital signs taken during this treatment. After treatment:   ? Patient left in no apparent distress sitting up in chair  ? Patient left in no apparent distress in bed  ? Call bell left within reach  ? Nursing notified  ? Caregiver present  ?  Bed alarm activated      Gladis Perez, PT  9/5/2019

## 2019-09-05 NOTE — PROGRESS NOTES
Per request of [address] rediscussed medicaid with pt and his wife. Pt and wife state that they do not meet the financial criteria, do not plan to apply and do not intend to seek the supportive services of medicaid. Pt and wife state understanding of the services via medicaid noting that the wife's mother has the coverage. Pt and wife decline screening need. No further needs are noted at this time.

## 2019-09-09 ENCOUNTER — HOSPITAL ENCOUNTER (OUTPATIENT)
Dept: LAB | Age: 64
Discharge: HOME | End: 2019-09-09
Payer: MEDICARE

## 2019-09-09 LAB
ALBUMIN SERPL-MCNC: 3.2 G/DL (ref 3.4–5)
ANION GAP SERPL CALC-SCNC: 9 MMOL/L (ref 3–18)
BUN SERPL-MCNC: 42 MG/DL (ref 7–18)
BUN/CREAT SERPL: 26 (ref 12–20)
CALCIUM SERPL-MCNC: 8.6 MG/DL (ref 8.5–10.1)
CHLORIDE SERPL-SCNC: 111 MMOL/L (ref 100–111)
CO2 SERPL-SCNC: 24 MMOL/L (ref 21–32)
CREAT SERPL-MCNC: 1.6 MG/DL (ref 0.6–1.3)
GLUCOSE SERPL-MCNC: 104 MG/DL (ref 74–99)
PHOSPHATE SERPL-MCNC: 4.6 MG/DL (ref 2.5–4.9)
POTASSIUM SERPL-SCNC: 4.3 MMOL/L (ref 3.5–5.5)
SODIUM SERPL-SCNC: 144 MMOL/L (ref 136–145)

## 2019-09-09 PROCEDURE — 36415 COLL VENOUS BLD VENIPUNCTURE: CPT

## 2019-09-09 PROCEDURE — 80069 RENAL FUNCTION PANEL: CPT

## 2019-09-18 ENCOUNTER — HOSPITAL ENCOUNTER (OUTPATIENT)
Dept: GENERAL RADIOLOGY | Age: 64
Discharge: HOME | End: 2019-09-18
Attending: INTERNAL MEDICINE
Payer: COMMERCIAL

## 2019-09-18 DIAGNOSIS — R13.10 DYSPHAGIA: ICD-10-CM

## 2019-09-18 DIAGNOSIS — R13.10 DYSPHAGIA, UNSPECIFIED TYPE: ICD-10-CM

## 2019-09-18 PROCEDURE — 92611 MOTION FLUOROSCOPY/SWALLOW: CPT

## 2019-09-18 PROCEDURE — 74230 X-RAY XM SWLNG FUNCJ C+: CPT

## 2019-09-18 PROCEDURE — 74011000255 HC RX REV CODE- 255: Performed by: INTERNAL MEDICINE

## 2019-09-18 RX ADMIN — BARIUM SULFATE 700 MG: 700 TABLET ORAL at 13:00

## 2019-09-18 RX ADMIN — BARIUM SULFATE 30 ML: 400 SUSPENSION ORAL at 13:00

## 2019-09-18 RX ADMIN — BARIUM SULFATE 90 G: 960 POWDER, FOR SUSPENSION ORAL at 13:00

## 2019-09-18 RX ADMIN — BARIUM SULFATE 30 ML: 400 PASTE ORAL at 13:00

## 2019-09-18 NOTE — PROGRESS NOTES
Outpatient Modified Barium Swallow Evaluation    Patient: Yola Rutherford (89 y.o. male)  Date: 9/18/2019  Primary Diagnosis: Dysphagia [R13.10]  Precautions: Aspiration       Recommend   Regular diet with thin liquids   Chin tuck with all liquids  No straws   Meds whole in applesauce or pudding   Aspiration precautions   HOB >45 degrees during all intake and for at least 30 minutes  Oral care three times daily   Continue SLP to address dysphagia     Videofluoroscopy Results  MBS completed with pt demonstrating min oral and mild-mod pharyngeal dysphagia characterized by silent laryngeal penetration during the swallow with thin liquids +/- straw, resolved with tsp size presentations and chin tuck. Pt able to tolerate NTL +/- straw, pudding, regular solids and 13 mm Ba pill with pudding wash with positive airway protection noted across multiple trials. Deficits include decreased base of tongue strength with premature spillage, decreased laryngeal elevation/adduction/sensation and slowed epiglottic inversion. Pt with mildly decreased pharyngeal motility with min pharyngeal residue noted across trials of thin liquids. Pt demo adequate mastication/oral clearance with solids. Recommend regular diet with thin liquids with use of chin tuck with all liquids. Further recommend no straws and primary SLP to address readiness to advance to small sips via cup with thin liquids. Results/recommendations discussed with pt and pt's wife with video feedback. Both were able to verbalize understanding. Video Flouroscopic Procedures  [x] Lateral View   [] A-P View [] Scanned to level of Sternum    [x] Seated at 90 deg.   [] Other:    Presentation:    [x] Spoon   [x] Cup   [] Straw   [] Syringe   [x] Consecutive Swallows  [] Other:     Consistencies:   [x] Ba+ liquid    [x] Ba+ liquid (nectar)    [] Ba+ liquid (honey)    [x] Ba+ pudding  [x] Ba+ cookie  [x] 13 mm Ba pill with pudding Ba wash    Treatment Techniques Attempted: [] Head Turn: [] Right [] Left     [] Head Tilt: [] Right [] Left     [x] Chin Down:  [x] Small Sips/bites: Tsp size sip via cup   [x] Effortful swallow:  [] Double swallow:  [] Other:    Results  Dysphagia Present:     [x] Yes  [] No    Ratings of Dysphagia:     [x] Mild  [x] Moderate  [] Severe    Stages of Breakdown:   [x] Oral  [x] Pharyngeal  [] Esophageal    Aspiration:   [] Yes    [x] No  [x] At Risk     Cough: [] Yes      [] No     Penetration:   [] Yes    [] No     Cough: [] Yes      [x] No   [] Flash/trace   [x] Mod   [] To Chords          Consistency Aspirated:   Consistency Penetrated:   [] Thin Liquid     [x] Thin Liquid  [] Nectar-thick Liquid    [] Nectar-thick Liquid   [] Honey-thick Liquid    [] Honey-thick Liquid   [] Puree     [] Puree  [] Solid     [] Solid    Motility Problems with:  [] Lip Closure:    [] Mastication:   [x] Bolus Formation/control:   [] A-P Transport:  [x] Tongue Base Retraction:  [] Swallow Response (delayed):  [] Velopharyngeal Closure:  [] Pharyngeal Aspirations:  [x] Laryngeal Elevation/adduction:  [x] Epiglottic Inversion:  [x] Pharyngeal motility/sensation:  [] Cricopharyngeal Relaxation:  [] Esophageal Peristalsis:  [] Other:    Timing of Aspiration/Penetration:  [] Before Swallow:  [x] During Swallow:  [] After Swallow:    Transit Time Delay:  [] >1 Second  Oral  [x] >1 Second Pharyngeal  [] >20 Second Esophageal     Residuals:  [x] Vallecula:    [x] Mild  [] Mod  [] Severe  [] Pyriform Sinus:   [] Mild  [] Mod  [] Severe  [] Posterior Pharyngeal Wall:  [] Mild  [] Mod  [] Severe    Thank you for this referral,   MONICA SolimanS., [id number] [facility]  Speech-Language Pathologist

## 2019-10-22 ENCOUNTER — HOSPITAL ENCOUNTER (OUTPATIENT)
Dept: PHYSICAL THERAPY | Age: 64
Discharge: HOME | End: 2019-10-22
Payer: MEDICARE

## 2019-10-22 PROCEDURE — 97165 OT EVAL LOW COMPLEX 30 MIN: CPT

## 2019-10-22 PROCEDURE — 97162 PT EVAL MOD COMPLEX 30 MIN: CPT

## 2019-10-22 PROCEDURE — 97110 THERAPEUTIC EXERCISES: CPT

## 2019-10-22 PROCEDURE — 92507 TX SP LANG VOICE COMM INDIV: CPT

## 2019-10-22 PROCEDURE — 92522 EVALUATE SPEECH PRODUCTION: CPT

## 2019-10-22 PROCEDURE — 97112 NEUROMUSCULAR REEDUCATION: CPT

## 2019-10-22 NOTE — PROGRESS NOTES
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 Pt was able to have tPA, and received triple disciplinary rehab inpatient and at [facility]. To date, he exhibits residual speech deficits and reports dysphagia. At this point, pt reports his biggest concern is his motor speech. \" I slur when I say /s/ and /l/\". Date of Onset:19 posterior Basal ganglia CVA  Social History: ; adult children Retired [employer]. Worked part-time for the [employer]. Prior Functional level: Prior to CVA speech/language, cognitive, swallow, voice all WNLS; wear reading glasses. Radiology: 19:  Recommend   Regular diet with thin liquids   Chin tuck with all liquids  No straws   Meds whole in applesauce or pudding   Aspiration precautions   HOB >45 degrees during all intake and for at least 30 minutes  Oral care three times daily   Continue SLP to address dysphagia      Videofluoroscopy Results  MBS completed with pt demonstrating min oral and mild-mod pharyngeal dysphagia characterized by silent laryngeal penetration during the swallow with thin liquids +/- straw, resolved with tsp size presentations and chin tuck. Pt able to tolerate NTL +/- straw, pudding, regular solids and 13 mm Ba pill with pudding wash with positive airway protection noted across multiple trials.   Deficits include decreased base of tongue strength with premature spillage, decreased laryngeal elevation/adduction/sensation and slowed epiglottic inversion. Pt with mildly decreased pharyngeal motility with min pharyngeal residue noted across trials of thin liquids. Pt demo adequate mastication/oral clearance with solids. Recommend regular diet with thin liquids with use of chin tuck with all liquids. Further recommend no straws and primary SLP to address readiness to advance to small sips via cup with thin liquids. Results/recommendations discussed with pt and pt's wife with video feedback.  Both were able to verbalize understanding.       OBJECTIVE    OUTPATIENT SPEECH-LANGUAGE EVALUATION    Receptive Language:  Receptive vocabulary    [x] WNL    [] Impaired [] Mild [] Mod [] Severe  Reliability of yes/no responses to questions [x] WNL    [] Impaired [] Mild [] Mod [] Severe Reliability of responses to complex ideation [x] WNL    [] Impaired [] Mild [] Mod [] Severe  Auditory retention/processing   [x] WNL    [] Impaired [] Mild [] Mod [] Severe  Follow commands [] 1 Step [] 2 Step [] 3 Step [] complex  Objective Information:    Expressive Language  Automatic speech   [x] WNL    [] Impaired [] Mild [] Mod [] Severe  Able to identify objects/pictures  [x] WNL    [] Impaired [] Mild [] Mod [] Severe  Preservations    [x] WNL    [] Impaired [] Mild [] Mod [] Severe  Word retrieval    [x] WNL    [] Impaired [] Mild [] Mod [] Severe  Paraphasias   []None[] Literal    [] Phenomic   [] Jargon   [] Semantic  Able to make needs known at level [] Word   [] Phrase   [] Sentence   [] Gesture        [] Unable   Objective Information:    Writing: [x] L or [] R [] WNL    [] Impaired @ [] Word [] Sentence [] Paragraph    Reading:  [x] WNL    [] Impaired @ [] Word [] Sentence [] Paragraph    Cognition:  Attention: [x] Alert    [] Drowsy  Orientation: [x] Person   [x] Place    [x] Time  Memory: [x] WNL    [] Impaired ST   [] Impaired LT  Comments:    Executive Functions:  Problem Solving: [x] WNL     [] Impaired [] Mild [] Mod [] Severe  Neglect:  [x] None    [] Left   [] Right  Self-regulation  [x] Appropriate   [] Impulsive   [] Requires verbal cues  Awareness:  [] Poor initiation   [] Disinhibition   [] Constant supervision    Very aware    Speech:  Oral Verbal Apraxia: [x] None    [] Mild    [] Moderate    [] Severe   Dysarthria:  [] None    [] Mild    [] Moderate    [] Severe   Intelligibility:  [] WNL    [] Words   [] Phrases   [] Sentences   [] Conversation      % Intelligible:  Voice:   [x] WNL    [] Hoarse   [] Breathy   Comments:  Fluency:  [x] WNL    [] Dysfluent: The Frenchay Dysarthria Assessment 2nd Edition (FDA-2) was administered. Results are scored by a rating scale form from an A (normal function) to an E (no function) by 7 sections, including:  Reflexes, Respiration, lips, Palate, Laryngeal, Tongue, and Intelligibility. Results are as indicated:    Reflexes- Cough A-B, Swallow B-C, Dribble/Drool A   Respiration: At Rest A, At Speech B-C  Lips: At Rest B, Spread A, Seal A, Alternate B, In speech B-C  Palate: Fluids A, Maintenance B, In-Speech A  Laryngeal: Time C, Pitch C-D, Volume C ,  In speech B ,  Tongue: At rest: A, Protrusion: C, Elevation: B-C, Lateral: B, Alternate: C, In Speech: C  Intelligibility: Words: C, Sentences: C, Conversation: B-C    A= Normal for age, B = Mild abnormality noticeable to skilled observer, C= Abnormality obvious but can perform task/movements with reasonable approximation, D= Some production of task but poor in quality, unable to sustain, inaccurate or extremely labored, E= Unable to undertake/ movement/ sound    Findings included: Moderate lingual strength/ coordination, warranting extneded time for articulatory placement. Mild-moderate lingual impairments were noted with reduced labial seal and briskness in speech.   Pt denies c/o SOB at rest, but exhibited reduced breath support in speech tasks with reduced respiration to speech coordination. His palate was symmetrical, however, SLP noted uvulaectomy, in which pt reports is \"many years old\". His sustained   vowel was reduced at 5.2 seconds using thoracic breathing. He exhibited  difficulty with pitch and volume change. His voice was mostly effective to a trained ear, with only difficulty relating to pitch/ volume deviance. Overall, his speech was judged at ~80% intelligibility to a trained ear with mild-moderate distortions/ omissions, especially with/ sh, ch,z, voiced th,s, and l. Pt is scheduled to address dysphagia this week. Pt would benefit from [address] [place] [address] in order to address dysarthria, in order to improve speech intelligibility warranted for verbal ADLs       Patient/Caregiver instruction/education: [x] Review HEP    [] Progressed/Changed    HEP/Handouts given: Comp strategies to improve speech intelligibility    Pain Level (0-10 scale) post treatment: 0  ASSESSMENT  [x]  See Plan of Care    Short Term Goals: To be accomplished in 4 weeks  1. The pt will increase quality and length of phonation by sustaining ah utilizing effective diaphragmatic breath support for >10 seconds in 3/3 sessions to increase functional speech intelligibility. 2. Pt will demonstrate ability to produce improve prosody, rhyme, rhythm in structured sentences, word emphasis tasks, and varying pitch phrases using various comp strategies with 80% acc given min-mod cues in 3/3opportunities to improve naturalness of speech. 3. The patient will articulate  (s/z) consonants at the word level- phrases level  with 90% acc with use of comp strategies and OMES  to improve speech intelligibility to > 90% acc when provided with min cues. 4. The patient will articulate  (l, voiced /th/ consonants at the word level- phrases level  with 90% acc with use of comp strategies and OMES  to improve speech intelligibility to > 90% acc when provided with min cues. Long Term Goals:  To be accomplished in 12 weeks   1. Patient will develop functional and intelligible speech with use of compensatory strategies through the use of adequate labial and lingual function, adequate intensity, increased articulatory precision, and speech prosody. PLAN  []  Upgrade activities as tolerated     [x]  Continue plan of care  []  Discharge due to:__  [x] Other:__Tx initiated by skilled instruction/ teaching of comp strategies.     Jaylen Fulton SLP 10/22/2019  10:51 AM  MA, CCC-SLP  Speech-Language Pathologist

## 2019-10-22 NOTE — PROGRESS NOTES
OT DAILY TREATMENT NOTE 10-18    Patient Name: Olive Castro  Date:10/22/2019  : 1955  [x]  Patient  Verified  Payor: VA MEDICARE / Plan: VA MEDICARE PART A & B / Product Type: Medicare /    In time:830  Out time:900  Total Treatment Time (min): 30  Visit #: 1 of 24    Medicare/BCBS Only   Total Timed Codes (min):  10 1:1 Treatment Time:  30     Treatment Area: Cerebral infarction, unspecified [I63.9]    SUBJECTIVE  Pain Level (0-10 scale): 0/10  Any medication changes, allergies to medications, adverse drug reactions, diagnosis change, or new procedure performed?: [x] No    [] Yes (see summary sheet for update)  Subjective functional status/changes:   [] No changes reported  Feel the arm, just can't move it    OBJECTIVE  20 min []Eval                  []Re-Eval       10 min Neuromuscular Re-education:  []  See flow sheet :   Rationale: increase ROM  to improve the patients ability to grasp  Weight bearing on right palm with push ups in sitting  Reviewed tone, clonus and relationship on funciton      With   [] TE   [] TA   [] neuro   [] other: Patient Education: [x] Review HEP    [] Progressed/Changed HEP based on: weightbearing, OT POC, tone  [] positioning   [] body mechanics   [] transfers   [] heat/ice application   [] Splint wear/care   [] Sensory re-education   [] scar management      [] other:            Other Objective/Functional Measures:   Subjective: pt is a left hand dominant, 71 y.o.y/o, male who sustainedhad left CVA on 19   Prior level of function: Retired [employer], I self care yard care driving, worked part time as parts delivery and doing carpentry  Pain level:(0-no pain 10-debilitating pain) no      Current functional limitations/living situation: with wife in ranch with a few steps to enter, help with right lower extremity dressing for AFO, bathing , transfers to tub seat    Medical hx: DM, arthritis, depression, HTN    Medications: See the written copy of this report in the patient's paper medical record. Objective:  Sensation:WFL  Range of Motion:WNL left, right AROM limited to scapular motion, elbow extension, horizontal ab adduction.  PROM proximally TBA, distal WNL passively  Strength:Left WNL  Hand ROM PROM digits WNL    Hand Strength:   Gross Grasp 3pt Pinch Lateral Pinch Tip Pinch   Right  NT NT NT NT   Left 40 9 12 8     Nine-Hole Peg Test:  NT  Finger Opposition:unable    Palpation: No subluxation noted    ADLs  Feeding:        []MaxA   []ModA   [x]Luba   [] CGA   []SBA   []Erica   []Independent  UE Dressing:       []MaxA   []ModA   []Luba   [] CGA   []SBA   []Erica   [x]Independent  LE Dressing:       []MaxA   [x]ModA   []Luba   [] CGA   []SBA   []Erica   []Independent  Grooming:       []MaxA   []ModA   []Luba   [] CGA   []SBA   []Erica   [x]Independent  Toileting:       []MaxA   []ModA   [x]Luba   [] CGA   []SBA   []Erica   []Independent  Bathing:       []MaxA   [x]ModA   []Luba   [] CGA   []SBA   []Erica   []Independent  Light Meal Prep:    []MaxA   []ModA   []Luba   [] CGA   []SBA   []Erica   []Independent  Household/Other:  []MaxA   []ModA   []Luba   [] CGA   []SBA   []Erica   []Independent  Adaptive Equip:     []MaxA   []ModA   []Luba   [] CGA   []SBA   []Erica   []Independent  Driving:       [x]MaxA   []ModA   []Luba   [] CGA   []SBA   []Erica   []Independent  Money Mgmt:        []MaxA   []ModA   []Luba   [] CGA   []SBA   []Erica   [x]Independent    Coordination   GM                           FM []WFL          [x] Impaired   []WFL          [x] Impaired    Tone/Motor Control []WFL          [x] Impaired    Midline Symmetry []WFL          [x] Impaired    Visual Perception:                    R/L   Neglect           R/L Discrimination                   Body Scheme [x]WFL          [] Impaired   [x]WFL          [] Impaired     [x]WFL          [] Impaired     [x]WFL          [] Impaired    Visual Motor Skills                           Tracking                           Tracing Writing   [x]WFL          [] Impaired     [x]WFL          [] Impaired   [x]WFL          [] Impaired    Cognition [x]Person         [x]Place            [x]Date   [x]Situation/ Behavior    Follows Commands  []     1-step  [] 2-step   [x]Multi-step      Memory:                             STM                             LTM   [x]WFL          [] Impaired   [x]WFL          [] Impaired    Safety Awareness [x]WFL          [] Impaired    Judgment  [x]WFL          [] Impaired    Express Needs [x]WFL          [] Impaired           Pain Level (0-10 scale) post treatment: 0/10    ASSESSMENT/Changes in Function:      [x]  See Plan of Care  []  See progress note/recertification  []  See Discharge Summary             PLAN  []  Upgrade activities as tolerated     []  Continue plan of care  []  Update interventions per flow sheet       []  Discharge due to:_  []  Other:_      Rosalind Schmitt OTR/L 10/22/2019  8:10 AM    Future Appointments   Date Time Provider Marc Peg   10/22/2019  8:15 AM Giselle Ramos OTR/L [facility]   10/22/2019  9:30 AM Wellington Hurtado, PT [facility]   10/22/2019 10:30 AM Brennan Stinson SLP [facility]   12/13/2019  8:30 AM Christy Perez MD [address]

## 2019-10-22 NOTE — PROGRESS NOTES
[facility] [place] [employer] [facility]  [address] [place] [address]  [phone] fax    Plan of Care/Statement of Necessity for Occupational Therapy Services    Patient name: Joseph Campo Start of Care: 10/22/2019   Referral source: JENNIFER Riggs MD : 1955    Medical Diagnosis: Upper extremity weakness [R29.898]  Cerebral infarction, unspecified [I63.9]  Payor: VA MEDICARE / Plan: VA MEDICARE PART A & B / Product Type: Medicare /  Onset Date:19    Treatment Diagnosis: Weakness right UE   Prior Hospitalization: see medical history Provider#: [id number]   Medications: Verified on Patient summary List    Comorbidities: Depression, HTN, DM, arthritis   Prior Level of Function: REtired from [employer], I self care yard care driving, part time parts delivery, carpentry work          [employer] of Care and following information is based on the information from the initial evaluation. Assessment/ key information: 71year old left hand dominant male who had sudden onset of weakness in right side with speech deficit. He was hospitalized x 3 weeks, then went to [facility] until 10/18/19. He denies pain. He is currently performing self care at a minimal assist level for right lower extremity dressing, bathing and toileting. He has marked clonus in right arm when performing tasks or changing arm position. He has good scapular motion activley but shows some active shoulder flexion, horizontal adduction and elbow extension with support. Distal motion is present for slight supination only. Sensation is intact and no cognitive deficits were notable on gross screening. His Neuro Quality of Life for UE shows 82% limitation. He will benefit from skilled occupational therapy to improve right upper extremity function and independence in self and home care activities.       Evaluation Complexity: History LOW Complexity : Brief history review  Examination LOW Complexity : 1-3 performance deficits relating to physical, cognitive , or psychosocial skils that result in activity limitations and / or participation restrictions  Clinical Decision Making LOW Complexity : No comorbidities that affect functional and no verbal or physical assistance needed to complete eval tasks   Overall Complexity Rating: LOW     Problem List: Decreased range of motion, Decreased strength, Decreased coordination/prehension and Decreased ADL/functional abilities      Treatment Plan may include any combination of the following: Therapeutic exercise, Therapeutic activities, Neuromuscular re-education, Physical agent/modality, Manual therapy, Splinting/orthoses, Patient education and ADLs/IADLs    Patient / Family readiness to learn indicated by: asking questions, trying to perform skills and interest    Persons(s) to be included in education:   patient (P) and family support person (FSP);list wife    Barriers to Learning/Limitations: None    Patient Goal (s): Get back to 100%    Patient Self Reported Health Status: fair    Rehabilitation Potential: good    Short Term Goals: To be accomplished in 2 weeks:  1. Patient will be independent in home exercise program for [facility] of right UE to reduce level of assist for self care  2. Patient will be able to don right shoe with AFO with supervision. 3.  Patient will be familiar with task modification to allow him to bathe left side without assist.    Long Term Goals: To be accomplished in 8 weeks:   **1. Patient will be able to place right arm on table using active motion for use of right hand as assist.  2.  Patient will be Modified independent for all self care tasks. 3.  Patient will be able to use right hand as stabilizing assist for self care activities. 4.  Patient will report improved ability to use right hand to assist with home care as shown by reduced limitation of at least 20% on Neuro quality of Life.       Frequency / Duration: Patient to be seen 3 times per week for 8 weeks:    Patient/ Caregiver education and instruction: Diagnosis, prognosis, self care, activity modification, brace/ splint application and exercises  [x]  Plan of care has been reviewed with ANDERSON    Certification Period: 10/22/19    David AvilaSTUARTR/L 10/22/2019 11:53 AM      ________________________________________________________________________    I certify that the above Therapy Services are being furnished while the patient is under my care. I agree with the treatment plan and certify that this therapy is necessary.     Physician's Signature:____________Date:_________TIME:________    ** Signature, Date and Time must be completed for valid certification **    Please sign and return to [facility]   [address] [place] [address]  [phone] fax

## 2019-10-22 NOTE — PROGRESS NOTES
[facility] [place] [employer] [place]  [facility]  [address] [facility]  [phone] fax    Plan of Care/ Statement of Necessity for Physical Therapy Services    Patient name: Ana Beckwith Start of Care: 10/22/2019   Referral source: Mitchell Alexandre NP : 1955    Medical Diagnosis: Cerebral infarction, unspecified [I63.9]  Payor: VA MEDICARE / Plan: VA MEDICARE PART A & B / Product Type: Medicare /  Onset Date:19    Treatment Diagnosis: CVA/Right Hemiparesis   Prior Hospitalization: see medical history Provider#: [id number]   Medications: Verified on Patient summary List    Comorbidities: Diabetes, HBP, OA of right Ankle,Kidney disease, Dysphagia and /Dysarthria   Prior Level of Function: Retired [employer]. Likes to attend [employer] reinactments,      The Plan of Care and following information is based on the information from the initial evaluation. 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 Pt was able to have tPA. He has been in Rehab since onset with progress from [facility] to [facility] currently. Pt reports no pain since the stroke. He currently requires assist for some dressing, grooming and self care activities. Pt is his own historian today with good recollection of events. Pt presents to PT ambulating with a LBQC with a Positive Trendelenburg gait, Severe restricted ROM ot his Right UE with flaccidity. Pt is able to perform chair transfers but has difficulty with bed transfers due to poor core/trunk coordination/strength. He has sustained 1 fall at home while trying to get into his tall bed at home. LE sensation is intact bilaterally, Right LE ROM is limited, weak and unstable. Romberg EO with SBA x 30 sec. EC x 20 sec. Pt lives in a 1 story home with 3 steps with 1 HR to enter.    Pt will benefit from skilled Therapy to improve with regaining strength, /mobility, Functional ADL's, ROM, and QOL to return to PLOF with minimal to no PhysicalLE deficits. Evaluation Complexity History MEDIUM  Complexity : 1-2 comorbidities / personal factors will impact the outcome/ POC ; Examination MEDIUM Complexity : 3 Standardized tests and measures addressing body structure, function, activity limitation and / or participation in recreation  ;Presentation MEDIUM Complexity : Evolving with changing characteristics  ; Clinical Decision Making MEDIUM Complexity : FOTO score of 26-74  Overall Complexity Rating: MEDIUM  Problem List: pain affecting function, decrease ROM, decrease strength, impaired gait/ balance, decrease ADL/ functional abilitiies, decrease activity tolerance, decrease flexibility/ joint mobility and decrease transfer abilities   Treatment Plan may include any combination of the following: Therapeutic exercise, Therapeutic activities, Neuromuscular re-education, Physical agent/modality, Gait/balance training, Manual therapy, Patient education, Self Care training, Functional mobility training, Home safety training and Stair training  Patient / Family readiness to learn indicated by: asking questions, trying to perform skills and interest  Persons(s) to be included in education: patient (P)  Barriers to Learning/Limitations: None  Patient Goal (s): 100%  Patient Self Reported Health Status: fair  Rehabilitation Potential: good    Short Term Goals: To be accomplished in 1 weeks:   1. Pt will be compliant with a HEP to improve function. Long Term Goals: To be accomplished in 8 weeks:   1. Pt will ambulate 4 steps x 3 with 1 HR with supervision to be able to navigate stairs at home. 2. Pt will increase FOTO score by 7 pts to improve LE function. 3. Pt will increase right LE hip, quad and HS strength to 4/5 or greater to be able to navigate community distances.     4. Pt will demonstrate Romberg EO/EC on a compliant surface to decrease fall risk during ADL's.    5/. Pt will perform sit to stand x 10 to ease with transfers safely. 6. Pt will ambulate with LRAD >150 ft to be able to navigate short community distances. Frequency / Duration: Patient to be seen 2 times per week for 8 weeks. Patient/ CarPatient/ Caregiver education and instruction: Diagnosis, prognosis, self care, activity modification and exercises   [x]  Plan of care has been reviewed with PTA     Certification Period: 10/22/19-12/2019    Kira Rubio, PT 10/22/2019 10:28 AM    ________________________________________________________________________    I certify that the above Therapy Services are being furnished while the patient is under my care. I agree with the treatment plan and certify that this therapy is necessary.     Physician's Signature:____________Date:_________TIME:________    ** Signature, Date and Time must be completed for valid certification **    Please sign and return to [facility]  [address] [place] [address]  [phone] fax

## 2019-10-22 NOTE — PROGRESS NOTES
PT DAILY TREATMENT NOTE 10-18    Patient Name: Laurie Cano  Date:10/22/2019  : 1955  [x]  Patient  Verified  Payor: [place] MEDICARE / Plan: VA MEDICARE PART A & B / Product Type: Medicare /    In time:945  Out time:1015  Total Treatment Time (min): 45  Visit #: 1       Treatment Area: Cerebral infarction, unspecified [I63.9]    SUBJECTIVE  Pain Level (0-10 scale): 0/10  Any medication changes, allergies to medications, adverse drug reactions, diagnosis change, or new procedure performed?: [x] No    [] Yes (see summary sheet for update)  Subjective functional status/changes:   [] No changes reported  19 Left Posterior Basal Ganglia. Dyshpagia. Had TPa. Left hand dominant. Right side weakness. In hospital x 1 month, then rehab, MV acute then to skilled nursing until recently   [facility] and [facility] then to [facility] 2 weeks. Lives in ranch home 2 steps with HR. Likes to visit reenactments of civil wars,   Nopain no dizziness. No visual impairments. OBJECTIVE    20 min []Eval                  []Re-Eval       35 min Therapeutic Exercise:  [] See flow sheet :   Rationale: increase ROM, increase strength, improve coordination and improve balance to improve the patients ability to ease with ambulation. 8 min Gait Training:  ___ feet with ___ device on level surfaces with ___ level of assist   Rationale: With   [] TE   [] TA   [] neuro   [] other: Patient Education: [x] Review HEP    [] Progressed/Changed HEP based on:   [] positioning   [] body mechanics   [] transfers   [] heat/ice application    [] other:      Other Objective/Functional Measures: Ambulate with LBQC >100 ft. CGA/supervision. Right hip flexion=4-/5  Abd=4/5, extension 4/5, add=4+ ,   Nopain. Ambulate with decreased weight bearing on left LE during step thru gait with right lateral lean.      Pain Level (0-10 scale) post treatment: 0/10    ASSESSMENT/Changes in Function: see poc    Patient will continue to benefit from skilled PT services to modify and progress therapeutic interventions, address functional mobility deficits, address ROM deficits, address strength deficits, analyze and address soft tissue restrictions, analyze and cue movement patterns, analyze and modify body mechanics/ergonomics, assess and modify postural abnormalities and address imbalance/dizziness to attain remaining goals.      [x]  See Plan of Care  []  See progress note/recertification  []  See Discharge Summary         Progress towards goals / Updated goals:  See poc    PLAN  [x]  Upgrade activities as tolerated     [x]  Continue plan of care  []  Update interventions per flow sheet       []  Discharge due to:_  []  Other:_      Tika Juarez, PT 10/22/2019  9:25 AM    Future Appointments   Date Time Provider Marc Mirelesi   10/22/2019  9:30 AM Ramsey Booth, PT [facility]   10/22/2019 10:30 AM SILVERIO Lao [facility]   12/13/2019  8:30 AM Regina Perez MD [phone]

## 2019-10-23 ENCOUNTER — HOSPITAL ENCOUNTER (OUTPATIENT)
Dept: PHYSICAL THERAPY | Age: 64
Discharge: HOME | End: 2019-10-23
Payer: MEDICARE

## 2019-10-23 PROCEDURE — 97110 THERAPEUTIC EXERCISES: CPT

## 2019-10-23 PROCEDURE — 97112 NEUROMUSCULAR REEDUCATION: CPT

## 2019-10-23 PROCEDURE — 92610 EVALUATE SWALLOWING FUNCTION: CPT

## 2019-10-23 PROCEDURE — 97535 SELF CARE MNGMENT TRAINING: CPT

## 2019-10-23 PROCEDURE — 92526 ORAL FUNCTION THERAPY: CPT

## 2019-10-23 NOTE — PROGRESS NOTES
ST DAILY TREATMENT NOTE    Patient Name: Joseph Campo  Date:10/23/2019  : 1955  [x]  Patient  Verified  Payor: Maribell Hansen / Plan: VA MEDICARE PART A & B / Product Type: Medicare /   In time: 11:48 Out time: 12:42  Total Treatment Time (min): 47  Visit #: 1 of 36    SUBJECTIVE  Pain Level (0-10 scale): 0  Any medication changes, allergies to medications, adverse drug reactions, diagnosis change, or new procedure performed?: [x] No    [] Yes (see summary sheet for update)  Subjective functional status/changes:   [] No changes reported  Recent MBS (19) results as followed:  MBS completed with pt demonstrating min oral and mild-mod pharyngeal dysphagia characterized by silent laryngeal penetration during the swallow with thin liquids +/- straw, resolved with tsp size presentations and chin tuck. Pt able to tolerate NTL +/- straw, pudding, regular solids and 13 mm Ba pill with pudding wash with positive airway protection noted across multiple trials. Deficits include decreased base of tongue strength with premature spillage, decreased laryngeal elevation/adduction/sensation and slowed epiglottic inversion. Pt with mildly decreased pharyngeal motility with min pharyngeal residue noted across trials of thin liquids. Pt demo adequate mastication/oral clearance with solids. Recommend regular diet with thin liquids with use of chin tuck with all liquids. Further recommend no straws and primary SLP to address readiness to advance to small sips via cup with thin liquids. Results/recommendations discussed with pt and pt's wife with video feedback. Both were able to verbalize understanding.       Date of Onset: 19  Social History: , Retired [employer] and worked part-time for the [employer]; lives in [place] with his wife; enjoys woodworking, attending Civil war reenactments, traveling/cruises, and mowing the lawn  Prior Functional level: Speech-language, swallowing, cognitive, and voice WNL; regular diet with thin liquids, no history of dysphagia  Current diet: Regular solids/thin liquids with utilization of compensatory strategies  Positionin degrees  Mental Status:  [x]alert []lethargic []confused  Orientation: [x]person [x]place [x]time [x]situation  AC Directions: []1-step []2-step [x]3-step []complex  Motivation: [x]excellent []good  []fair  []poor   Barriers to learning: [x]none []aphasia []? cognition []lethargy/motivation [][place]   []?vision []language []Fatigue/pain []psych factors compensate with:   Dentition: []normal []abnormal []edentulous [x]dentures   Respiratory Status: [x]WFL []SOB  []O2 L/min:  []NC []Mask               []Trach Tube:                     []Excess secretions  Lips:  []Symmetrical [x]asymmetrical  Retraction []WFL  []?min [x]?mod [x]? 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  [x]Symmetrical []asymmetrical  Gag Reflex:  []Present []absent [x]DNT  Sensation:  []Intact [x]Diminished  Specify: Silent laryngeal penetration noted in recent MBS   Voice:  [x]Normal []Hoarse []harsh  []Breathy []Hypernasal []hyponasal  []Gurgly Other:   Swallow:  [x]Volitional []absent  [x]Reflexive []absent  Cough   Strength : [x]WNL []Diminished  [x]Volitional []absent  [x]Reflexive []absent  OBJECTIVE    OP SWALLOW EVALUATION    Observation of Swallow:  Thin Nectar Honey Puree  applesauce Solids  Mechanical soft fruit and grain bar/regular peanut butter cracker nabs Other  Mixed consistency canned fruit cocktail   Oral Phase         Anterior loss of bolus  (decreased labial seal [])         Decreased bolus formation  (?lingual ROM/Coord[])         Increased mastication         Increased oral transit time  (?A-P bolus transit[])         Abnormal chewing         Tongue pumping/tremors         Pocketing  (?labial/buccal tension/lingual control)     X mild R buccal pocketing, cleared i'ly    Other         Oral Pharyngeal Phase         Delayed swallow initiation         Absent swallow         Stasis on lingual surface  (?lingual movement)         Adherence to hard palate   (? lingual elevation)         Pharyngeal Phase         Multiple swallows  (residue in pharynx [])         Coughing post swallow X x2 without utilization of chin tuck        Throat clear post swallow         Wet vocal quality  (residue on vocal cords []) X x1 with successive swallows         Reduced laryngeal elevation X        Nasal Regurgitation  (? velopharyngeal closure)         Other           Good utilization of chin tuck for liquid intake; required min A across trials. Patient without s/sx of aspiration across trials of mixed consistency and pureed. Mild oral stasis with crackers, cleared with finger sweep and liquid wash.   [] No symptoms of dysphagia evidenced  [x] Symptoms of dysphagia observed   [x] Patient at risk for aspiration  [] Other: Continue utilization of chin tuck with small sips of liquid to reduce risk for aspiration    Recommendations:  Diet:  [] NPO    [] Pureed [] Ground [] MechSoft [x] Regular  Liquids: [] Water  [x] Regular [] Thickened   [] [place]  [] [place]  [] Pudding  Presentation: [x] Cup    [] Spoon [] Straw [] Alternate liquids and solids  Monitor: [x] Sitting up at 90 deg [] Reclined to:  [] Head turned to:    [x] Chin tuck  [] Head tilt to:      [] Seated upright post meals (min):  Document: [] Coughing [] Temperatures [] Lung Sounds    Videoflouroscopy: [] Yes [x] No  Dysphagia Treatment: [x] Yes [] No Sessions per week: 3  Other:    Remediation Techniques:  C = Compensatory techniques to use during meal      F = Facilitation/treatment techniques by SLP    [] Supraglottic Swallow (c,f)    [] Oral motor exercises (f)  [] Super-supraglottic swallow (c,f)   [] Labial closure  [] Compensations for pocketing (c)   [] Lingual elevation  [x] Sweep mouth with tongue    [] Lingual lateralization  [x] Sweep mouth with finger    [] Lingual anterior-posterior  [] External pressure to check   [] Lingual base of tongue  [] Rinse mouth/expel after meal   [] Vocal Fold Exercises (f)  [] Alternate liquid swallows every _ bites (c)  [x] Falsetto/laryngeal elevation exercises (f)  [] Discourage liquid wash between bites (c)  [] Thermal application (c,f)  [x] Multiple swallows     [] Sour bolus (f)  [] Patient needs cues     [] Cold bolus (f)  [] Patient does not need cues   [x] Pharyngeal exercise (f)  [x] Mendelsohn Maneuver (c,f)    [] Breath hold  [] Encourage/stimulate lip closure (c)   [x] Effortful Swallow (c,f)  [] Empty mouth before next bite (c)   [x] Tongue base retraction  [x] Cue patient to slow down (c)    [] Tongue hold  [] Encourage coughing (c)    [] Laryngeal closure  [x]Chin tuck (c)  []Head turn  (c)  []Head turn , chin tuck (c)    Patient/Caregiver instruction/education: [x] Review HEP    [] Progressed/Changed    HEP/Handouts given: Oropharyngeal swallowing exercises, hard /k/ exercises    Pain Level (0-10 scale) post treatment: 0    ASSESSMENT  [x]  See Plan of Care    Short Term Goals: To be accomplished in 4 weeks  1. Patient will complete tongue base retraction,and laryngeal/pharyngeal strengthening exercises (including Sheyla maneuver, Mendelsohn maneuver, modified Shaker with CTAR ball, hard /k/ in isolation,  effortful swallow, etc.), with min cues in 5/5 sessions in order to improve the strength/agility/efficiency of his swallow for improved swallowing safety and QOL.      2. Patient will recall/demonstrate aspiration precautions and safe swallowing strategies with 100% acc when provided with occasional cues in 5/5 sessions (small sips/bites; chin tuck with all liquid intake; alternate liquids/solids; slow rate; NO straws, Sit upright at 90 degree angle during PO trials)  to decrease the reported incidences of dysphagia and s/sx of aspiration. 3. Patient will tolerate trials of thin liquids (>4 ounces) using compensatory strategies without s/sx of aspiration/penetration when provided with Michael across 5/5 sessions. 4. Patient will tolerate regular solids with utilization of compensatory strategies without s/sx of aspiration/penetration or distress with Michael across 5/5 sessions. Long Term Goals: To be accomplished in 12 weeks     1. Patient will consume a regular diet and thin liquids with utilization of compensatory swallowing strategies with julia in 4/5 trials without s/s of aspiration/penetration to promote QOL, enhance hydration/ nutrition status, and reduce risk for aspiration.        PLAN  [x]  Upgrade activities as tolerated     [x]  Continue plan of care  []  Discharge due to:__  [x] Other: Tx initiated to include: Introduction of compensatory swallow strategies and oropharyngeal exercises; patient with prior ST treatment for dysphagia- able to do hard /k/ words i'ly; required assistance to complete Sheyla, Mendulsohn, and effortful swallow    Gloria Graham, SLP 10/23/2019  11:40 AM

## 2019-10-23 NOTE — PROGRESS NOTES
[facility] [place] [employer] [facility]  [address] [place] [address]  [phone] fax    Plan of Care/ Statement of Necessity for Speech Therapy Services    Patient name: Sondra Campbell Start of Care: 10/23/2019   Referral source: Alicia Vance NP : 1955    Medical Diagnosis: Cerebral infarction, unspecified [I63.9]  Payor: VA MEDICARE / Plan: VA MEDICARE PART A & B / Product Type: Medicare /  Onset Date:2019    Treatment Diagnosis: R13.12 Oropharyngeal dysphagia   Prior Hospitalization: see medical history Provider#: [id number]   Medications: Verified on Patient summary List    Comorbidities: Acute Ischemic Stroke (acute/early subacute infarct at the L posterior basal ganglia to corona radiata with residual R hemiparesis), Dysphagia, Dysarthria, Diabetes, Obstructive Sleep Apnea on CPAP, HBP, Kidney Disease,  Prior Level of Function: Speech-language, swallowing, cognition, and voice WNL; regular diet with thin liquids    The Plan of Care and following information is based on the information from the initial evaluation. Assessment/ key information: This 71year old male was referred to [address] [place] [address]  s/p recent CVA with new onset of dysarthria and dysphagia. Patient completed dysarthria evaluation on 10/22/19 at this facility with today's follow-up evaluation targeting assessment of dysphagia. Patient with new onset of dysphagia s/p CVA in 2019. Initial MBS completed on 19 indicating the presence of silent aspiration of thin and nectar thick consistencies and recommendation for honey thick liquids and mech soft solids. Repeat MBS completed on 19 with results as follows:     MBS completed with pt demonstrating min oral and mild-mod pharyngeal dysphagia characterized by silent laryngeal penetration during the swallow with thin liquids +/- straw, resolved with tsp size presentations and chin tuck.   Pt able to tolerate NTL +/- straw, pudding, regular solids and 13 mm Ba pill with pudding wash with positive airway protection noted across multiple trials. Deficits include decreased base of tongue strength with premature spillage, decreased laryngeal elevation/adduction/sensation and slowed epiglottic inversion. Pt with mildly decreased pharyngeal motility with min pharyngeal residue noted across trials of thin liquids. Pt demo adequate mastication/oral clearance with solids. Recommend regular diet with thin liquids with use of chin tuck with all liquids. Further recommend no straws and primary SLP to address readiness to advance to small sips via cup with thin liquids. Results/recommendations discussed with pt and pt's wife with video feedback. Both were able to verbalize understanding. A bedside swallowing evaluation was conducted today, as well as patient education and initiation of HEP. Patient was A&Ox4 with ability to follow multi-step directions independently. Patient presented with upper and lower dentures and adequate oral hygeine. Patient presents with reduced speech intelligibility with labial and lingual weakness noted during oral mechanism examination. Patient with minimally-moderately reduced labial retraction, protrusion, and strength, as well as reduced labial elevation, depression, lateralization, and strength. Patient's face is assymetrical at rest with labial deviation. Patient is able to produce a strong cough and throat clear. Today's evaluation revealed mild oropharyngeal dysphagia. The following consistencies were presented: thin liquids, pureed solids, mixed consistency, and regular solids.  Patient exhibited adequate bolus acceptance, functional bolus formulation/manipulation, mild buccal pocketing with regular consistencies, reduced laryngeal elevation/excursion across consistencies with mild fatiguing noted, immediate coughing with thin liquids x2 without utilization of chin tuck, and wet vocal quality noted x1 following successive sips of thin liquids. Patient benefited from small bites/sips and utilization of chin tuck across liquid trials. Patient reported occasional coughing with thin liquids at home when he takes larger sips. It was recommended that the patient continue with a regular diet with thin liquids with continued utilization of chin tuck to reduce risk for aspiration. Patient would benefit from implementation of oropharyngeal/laryngeal strengthening exercises, as well as skilled training in the utilization of compensatory swallow strategies to decrease risk for aspiration. Treatment was initiated during today's evaluation including: education regarding compensatory strategies and oropharyngeal exercise sheet with education. Patient reported understanding and demonstrated teach back across skilled training. This patient would benefit from skilled speech therapy services for dysphagia, as it is medically necessary to reduce the risk for aspiration, promote adequate nutrition/hydration, and increase QOL. Problem List:      []aphasic  [x]dysarthric  [x]dysphagic        []alexic  []agraphic  []dysphonia       []dysfluency   []Cognitive-Linguistic Disorder       []other   Treatment Plan may include any combination of the following: Dysphagia Treatment, Treatment of Swallowing and Patient Education      Patient / Family readiness to learn indicated by: asking questions, trying to perform skills and interest    Persons(s) to be included in education:   patient (P) and family support person (FSP); list: Moses Hardy (wife)    Barriers to Learning/Limitations: None    Patient Goal (s): I just want to be 100% again    Patient Self Reported Health Status: fair    Rehabilitation Potential: good    Short Term Goals:  To be accomplished in 4 weeks  1. Patient will complete tongue base retraction,and laryngeal/pharyngeal strengthening exercises (including Sheyla maneuver, Mendelsohn maneuver, modified Shaker with CTAR ball, hard /k/ in isolation,  effortful swallow, etc.), with min cues in 5/5 sessions in order to improve the strength/agility/efficiency of his swallow for improved swallowing safety and QOL.      2. Patient will recall/demonstrate aspiration precautions and safe swallowing strategies with 100% acc when provided with occasional cues in 5/5 sessions (small sips/bites; chin tuck with all liquid intake; alternate liquids/solids; slow rate; NO straws, Sit upright at 90 degree angle during PO trials)  to decrease the reported incidences of dysphagia and s/sx of aspiration.      3. Patient will tolerate trials of thin liquids (>4 ounces) using compensatory strategies without s/sx of aspiration/penetration when provided with Michael across 5/5 sessions.     4. Patient will tolerate regular solids with utilization of compensatory strategies without s/sx of aspiration/penetration or distress with Michael across 5/5 sessions. Long Term Goals: To be accomplished in 12 weeks   1. Patient will consume a regular diet and thin liquids with utilization of compensatory swallowing strategies with julia in 4/5 trials without s/s of aspiration/penetration to promote QOL, enhance hydration/ nutrition status, and reduce risk for aspiration.      Frequency / Duration: Patient to be seen 3 times per week for 12 weeks:    Patient/ Caregiver education and instruction: Diagnosis, prognosis, Swallowing Precautions, Compensatory Techniques and Exercises,  Oropharyngeal exercise handout, hard /k/ exercises    Certification Period: 10/22/2019-01/20/2020    SILVERIO Ray 10/23/2019 12:45  ________________________________________________________________________    I certify that the above Therapy Services are being furnished while the patient is under my care. I agree with the treatment plan and certify that this therapy is necessary.     Physician's Signature:____________Date:_________TIME:________    [employer], Date and Time must be completed for valid certification **    Please sign and return to [facility]  [address] [place] [address]  [phone] fax     Thank you

## 2019-10-23 NOTE — PROGRESS NOTES
OT DAILY TREATMENT NOTE 10-18    Patient Name: Zoran Maxwell  Date:10/23/2019  : 1955  [x]  Patient  Verified  Payor: [place] MEDICARE / Plan: VA MEDICARE PART A & B / Product Type: Medicare /    In time:1100  Out time:1145  Total Treatment Time (min): 45  Visit #: 2 of 24    Medicare/BCBS Only   Total Timed Codes (min):  45 1:1 Treatment Time:  45     Treatment Area: Upper extremity weakness [R29.898]  Cerebral infarction, unspecified [I63.9]    SUBJECTIVE  Pain Level (0-10 scale): 0/10  Any medication changes, allergies to medications, adverse drug reactions, diagnosis change, or new procedure performed?: [x] No    [] Yes (see summary sheet for update)  Subjective functional status/changes:   [] No changes reported  My wife helps with my brace  My PCP is Carlos Alberto Smoker. Please send notes to her.       OBJECTIVE         35 min Neuromuscular Re-education:  []  See flow sheet :   Rationale: increase ROM and increase strength  to improve the patients ability to move right UE  Weightbearing on palm right push up x 10  Sidelying right elbow flex ext with tapping  Supine right shoulder ab x 10  REcip shoulder flex ext and elbow flex ext with tapping as needed right  Right forearm sup pro with quick stretch  Right hand facilitation with washcloth brushing with slight response  Massager to increase biceps firing right UE      10 min Self Care/Home Management: AFO   Rationale: task modification  to improve the patients ability to don AFO and right shoe  Modified AFO to velcro to shoe  Able to don shoe with AFO in shoe without assist    With   [] TE   [] TA   [] neuro   [] other: Patient Education: [x] Review HEP    [] Progressed/Changed HEP based on: facilitaiton with washcloth  [] positioning   [] body mechanics   [] transfers   [] heat/ice application   [] Splint wear/care   [] Sensory re-education   [] scar management      [] other: AFO method           Other Objective/Functional Measures:   Able to don AFO with task modification     Pain Level (0-10 scale) post treatment: 0/10    ASSESSMENT/Changes in Function: Trace biceps and supination noted today    Patient will continue to benefit from skilled OT services to modify and progress therapeutic interventions, address ROM deficits, address strength deficits and instruct in home and community integration to attain remaining goals. []  See Plan of Care  []  See progress note/recertification  []  See Discharge Summary         Progress towards goals / Updated goals:  *1.  Patient will be independent in home exercise program for [facility] of right UE to reduce level of assist for self care  2. Patient will be able to don right shoe with AFO with supervision. met 10/23/19  3. Patient will be familiar with task modification to allow him to bathe left side without assist.     Long Term Goals: To be accomplished in 8 weeks:              **1. Patient will be able to place right arm on table using active motion for use of right hand as assist.  2.  Patient will be Modified independent for all self care tasks. 3.  Patient will be able to use right hand as stabilizing assist for self care activities. 4.  Patient will report improved ability to use right hand to assist with home care as shown by reduced limitation of at least 20% on Neuro quality of Life.       PLAN  []  Upgrade activities as tolerated     []  Continue plan of care  []  Update interventions per flow sheet       []  Discharge due to:_  []  Other:_      MALLIKA Duron/L 10/23/2019  1:58 PM    Future Appointments   Date Time Provider Marc Ruvalcaba   10/25/2019  9:00 AM Joey Betancur IRIEYQZ [facility]   10/25/2019 10:45 AM SILVERIO Morgan [facility]   10/30/2019  8:00 AM LINDA LoboIJXNO [facility]   10/30/2019  9:45 AM SILVERIO MorganLWIYI [facility]   10/30/2019 11:00 AM Joey Betancur VXANPJA [facility]   10/31/2019  9:00 AM Ron Sorto PTA MMCPTPB [facility]   10/31/2019 11:00 AM Kristine Willi Guido, PTA MMCPTPB [facility]   11/1/2019  8:00 AM Mayfield Stephen, PTA MMCPTPB [facility]   11/1/2019 10:30 AM Santana Fowler, SLP MMCPTPB [facility]   11/1/2019 11:15 AM Sagar Powell [id number] [facility]   11/4/2019 11:15 AM Sagar Powell DKLGFXY [facility]   11/4/2019  3:00 PM Mayfield Stephen, PTA MMCPTPB [facility]   11/4/2019  4:00 PM Sagar Powell WPGTATF [facility]   11/6/2019  2:30 PM Mayfield Stephen, PTA MMCPTPB [facility]   11/6/2019  3:15 PM Chuyita Marie, OTR/L MMCPTPB [facility]   11/8/2019  2:00 PM Sagar Powell ARPFXGK [facility]   11/8/2019  3:00 PM Mayfield Stephen, PTA MMCPTPB [facility]   11/8/2019  3:30 PM SILVERIO Ramos FCPYXEJ [facility]   11/13/2019 10:30 AM Mayfield Stephen, PTA MMCPTPB [facility]   11/13/2019 11:00 AM Chuyita Marie, OTR/L MMCPTPB [facility]   11/13/2019 12:15 PM SILVERIO Wynn ZYZJENP [facility]   11/15/2019  1:30 PM Mayfield Stephen, PTA MMCPTPB [facility]   11/15/2019  2:00 PM Shan Murray[place] EOUFUOR [facility]   11/15/2019  2:45 PM Carolyn Longo, SILVERIO OUAWVQL [facility]   11/19/2019  3:00 PM Chaparro Mitchell, PTA MMCPTPB [facility]   11/19/2019  3:30 PM Chuyita Marie, OTR/L MMCPTPB [facility]   11/19/2019  4:45 PM Carolyn Longo, SILVERIO VZXHBNP [facility]   11/20/2019  9:45 AM SILVERIO Ramos LDCVOLX [facility]   11/20/2019 10:30 AM Chaparro Mitchell, PTA MMCPTPB [facility]   11/20/2019 11:00 AM Sagar Powell TMQWOBV [facility]   11/22/2019  3:00 PM Chaparro Mitchell, PTA MMCPTPB [facility]   11/22/2019  3:30 PM Sagar Powell FKYQSOD [facility]   11/22/2019  4:15 PM Santana Fowler SLP MMCPTPB [facility]   11/25/2019 12:00 PM Paris Valero, PT MMCPTPB [facility]   11/25/2019  1:00 PM Chuyita Marie, OTR/L MMCPTPB [facility]   11/26/2019  3:00 PM Chaparro Stephen, PTA MMCPTPB [facility]   11/26/2019  4:15 PM Sgaar Powell TVQORXU [facility]   11/26/2019  5:30 PM Carolyn Longo, SILVERIO PBNKUKK [facility]   11/27/2019  3:00 PM Mayfield Stephen, PTA MMCPTPB [facility]   11/27/2019  4:00 PM Sagar Powell GSTMNKI [facility]   12/13/2019  8:30 AM Colette Perez MD [address]

## 2019-10-25 ENCOUNTER — TELEPHONE (OUTPATIENT)
Dept: PHYSICAL THERAPY | Age: 64
End: 2019-10-25

## 2019-10-25 ENCOUNTER — HOSPITAL ENCOUNTER (OUTPATIENT)
Dept: PHYSICAL THERAPY | Age: 64
End: 2019-10-25
Payer: MEDICARE

## 2019-10-25 NOTE — PROGRESS NOTES
[facility]  [address] [place] [address]  [phone] fax    Plan of Care/ Statement of Necessity for Speech Therapy Services    Patient name: Nivia Alexandre Start of Care: 10/25/2019   Referral source: Bree Barillas NP : 1955    Medical Diagnosis: Cerebral infarction, unspecified [I63.9]  Payor: VA MEDICARE / Plan: VA MEDICARE PART A & B / Product Type: Medicare /  Onset Date:19    Treatment Diagnosis: R47.1   Prior Hospitalization: see medical history Provider#: [id number]   Medications: Verified on Patient summary List     Acute Ischemic Stroke (acute/early subacute infarct at the L posterior basal ganglia to corona radiata with residual R hemiparesis), Dysphagia, Dysarthria, Diabetes, Obstructive Sleep Apnea on CPAP, HBP, Kidney Disease,  Prior Level of Function: Speech-language, swallowing, cognition, and voice WNL; regular diet with thin liquids  The Plan of Care and following information is based on the information from the initial evaluation. 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 Pt was able to have tPA, and received triple disciplinary rehab inpatient and at [facility]. To date, he exhibits residual speech deficits and reports dysphagia. At this point, pt reports his biggest concern is his motor speech. \" I slur when I say /s/ and /l/\". The [place] Dysarthria Assessment 2nd Edition (FDA-2) was administered. Results are scored by a rating scale form from an A (normal function) to an E (no function) by 7 sections, including:  Reflexes, Respiration, lips, Palate, Laryngeal, Tongue, and Intelligibility. Results are as indicated:     Reflexes- Cough A-B, Swallow B-C, Dribble/Drool A   Respiration: At Rest A, At Speech B-C  Lips:  At Rest B, Spread A, Seal A, Alternate B, In speech B-C  Palate: Fluids A, Maintenance B, In-Speech A  Laryngeal: Time C, Pitch C-D, Volume C ,  In speech B ,  Tongue: At rest: A, Protrusion: C, Elevation: B-C, Lateral: B, Alternate: C, In Speech: C  Intelligibility: Words: C, Sentences: C, Conversation: B-C     A= Normal for age, B = Mild abnormality noticeable to skilled observer, C= Abnormality obvious but can perform task/movements with reasonable approximation, D= Some production of task but poor in quality, unable to sustain, inaccurate or extremely labored, E= Unable to undertake/ movement/ sound     Findings included: Moderate lingual strength/ coordination, warranting extneded time for articulatory placement. Mild-moderate lingual impairments were noted with reduced labial seal and briskness in speech. Pt denies c/o SOB at rest, but exhibited reduced breath support in speech tasks with reduced respiration to speech coordination. His palate was symmetrical, however, SLP noted uvulaectomy, in which pt reports is \"many years old\". His sustained   vowel was reduced at 5.2 seconds using thoracic breathing. He exhibited  difficulty with pitch and volume change. His voice was mostly effective to a trained ear, with only difficulty relating to pitch/ volume deviance. Overall, his speech was judged at ~80% intelligibility to a trained ear with mild-moderate distortions/ omissions, especially with/ sh, ch,z, voiced th,s, and l/. Pt is scheduled to address dysphagia this week.  Pt would benefit from [address] [place] [address] in order to address mild-moderate dysarthria, in order to improve speech intelligibility warranted for verbal ADLs        Problem List:      []aphasic  [x]dysarthric  [x]dysphagic       []alexic  []agraphic  []dysphonia       []dysfluency   []Cognitive-Linguistic Disorder       []other   Treatment Plan may include any combination of the following: Dysarthria Treatment and Patient Education      Patient / Family readiness to learn indicated by: asking questions and trying to perform skills    Persons(s) to be included in education:   patient (P) and family support person (FSP);list wife    Barriers to Learning/Limitations: yes; Physical; disease process    Patient Goal (s): speak clearer    Patient Self Reported Health Status: fair    Rehabilitation Potential: good    Short Term Goals: To be accomplished in 4 weeks  1. The pt will increase quality and length of phonation by sustaining ah utilizing effective diaphragmatic breath support for >10 seconds in 3/3 sessions to increase functional speech intelligibility. 2. Pt will demonstrate ability to produce improve prosody, rhyme, rhythm in structured sentences, word emphasis tasks, and varying pitch phrases using various comp strategies with 80% acc given min-mod cues in 3/3opportunities to improve naturalness of speech. 3. The patient will articulate  (s/z) consonants at the word level- phrases level  with 90% acc with use of comp strategies and OMES  to improve speech intelligibility to > 90% acc when provided with min cues. 4. The patient will articulate  (l, voiced /th/ consonants at the word level- phrases level  with 90% acc with use of comp strategies and OMES  to improve speech intelligibility to > 90% acc when provided with min cues.     Long Term Goals: To be accomplished in 12 weeks             1. Patient will develop functional and intelligible speech with use of compensatory strategies through the use of adequate labial and lingual function, adequate intensity, increased articulatory precision, and speech prosody. Frequency / Duration: Patient to be seen 2-3 times per week for 12 weeks:     Patient/ Caregiver education and instruction: Diagnosis, prognosis, Compensatory Techniques,  And reviewed results of FDA-2 in order to mutually establish goals for POC.      Certification Period: 10/21/19-1/19/20  SILVERIO Castanon 10/21/2019 9:31 AM  MA, [facility]-SLP  Speech-Language Pathologist    ________________________________________________________________________    I certify that the above Therapy Services are being furnished while the patient is under my care. I agree with the treatment plan and certify that this therapy is necessary.     Physician's Signature:____________Date:_________TIME:________    ** Signature, Date and Time must be completed for valid certification **    Please sign and return to [facility]  [address] [place] [address]  [phone] fax     Thank you

## 2019-10-30 ENCOUNTER — HOSPITAL ENCOUNTER (OUTPATIENT)
Dept: PHYSICAL THERAPY | Age: 64
Discharge: HOME | End: 2019-10-30
Payer: MEDICARE

## 2019-10-30 PROCEDURE — 92526 ORAL FUNCTION THERAPY: CPT

## 2019-10-30 PROCEDURE — 92507 TX SP LANG VOICE COMM INDIV: CPT

## 2019-10-30 PROCEDURE — 97110 THERAPEUTIC EXERCISES: CPT

## 2019-10-30 PROCEDURE — 97112 NEUROMUSCULAR REEDUCATION: CPT

## 2019-10-30 NOTE — PROGRESS NOTES
ST DAILY TREATMENT NOTE    Patient Name: Pati Anand  Date:10/30/2019  : 1955  [x]  Patient  Verified  Payor: Payor: VA MEDICARE / Plan: VA MEDICARE PART A & B / Product Type: Medicare /   In time: 9:50  Out time: 10:30  Total Treatment Time (min): 40  Visit #: 3 of 24    Treatment Diagnosis: Cerebral infarction, unspecified [I63.9]    SUBJECTIVE  Pain Level (0-10 scale): 0  Any medication changes, allergies to medications, adverse drug reactions, diagnosis change, or new procedure performed?: [x] No    [] Yes (see summary sheet for update)    Subjective functional status/changes:   [x] No changes reported  \"I have been doing all of my exercises twice a day including the tongue one [referring to rickey] and ee [referring to high /i/]     OBJECTIVE  Treatment provided includes:  Increase/Improve:  []  Voice Quality []  Cognitive Linguistic Skills [x]  Laryngeal/Pharyngeal Exercises   []  Vocal Loudness []  Reading Comprehension [x]  Swallowing Skills    []  Vocal Cord Function []  Auditory Comprehension [x]  Oral Motor Skills   []  Resonance []  Writing Skills [x]  Compensatory strategies    [x]  Speech Intelligibility []  Expressive Language []  Attention   [x]  Breath Support/Coord.  []  Receptive language []  Memory   []  Articulation []  Safety Awareness [x] pt education    []  Fluency []  Word Retrieval []         Treatment Provided:  Dysarthria treatment (20 minutes):   - pt education / training of diaphragmatic breathing exercises with and without phonation (sustained vowels and sustained /s/)  - producing multi-syllabic words containing /s/ while utilizing compensatory strategies with skilled cueing to increase accuracy      Dysphagia treatment (20 minutes):   - pt education in regards to aspiration precautions, s/sx of aspiration, compensatory strategies to increase safety of swallow   - oropharyngeal strengthening exercises with skilled cueing   - trials of thin liquids utilizing compensatory techniques to reduce risk of aspiration     Patient/Caregiver  Education: [x] Review HEP      HEP/Handouts given: diaphragmatic breathing exercises ; continue oropharyngeal strengthening HEP established      Pain Level (0-10 scale) post treatment:0    ASSESSMENT   Overall progressing in speech production when utilizing compensatory strategies   []   Improving appropriately and progressing toward goals  [x]   Improving slowly and progressing toward goals  []   Approximating goals/maximum potential  [x]   Continues to benefit from skilled therapy to address remaining functional deficits  []   Not progressing toward goals and plan of care will be adjusted    Patient will continue to benefit from skilled therapy to address remaining functional deficits: swallowing ; speech intelligibility     Progress towards goals / Updated goals:  1. The pt will increase quality and length of phonation by sustaining ah utilizing effective diaphragmatic breath support for >10 seconds in 3/3 sessions to increase functional speech intelligibility. 10/30:19: 7-10 seconds for vowel prolongations when provided with mod-max instruction/cueing to increase accuracy. Pushing / strain noted toward end of phonation - clinician shaped vocal quality with gradual pt improvement   2. Pt will demonstrate ability to produce improve prosody, rhyme, rhythm in structured sentences, word emphasis tasks, and varying pitch phrases using various comp strategies with 80% acc given min-mod cues in 3/3opportunities to improve naturalness of speech. 10/30:19: not addressed this date - target next session  3. The patient will articulate  (s/z) consonants at the word level- phrases level  with 90% acc with use of comp strategies and OMES  to improve speech intelligibility to > 90% acc when provided with min cues.   10/30:19: /s/ in 3-4 syllable words with 70% accuracy with modA   4. The patient will articulate  (l, voiced /th/ consonants at the word level- phrases level  with 90% acc with use of comp strategies and OMES  to improve speech intelligibility to > 90% acc when provided with min cues. 10/30:19: not addressed this date - target next session   5. Patient will complete tongue base retraction,and laryngeal/pharyngeal strengthening exercises (including Sheyla maneuver, Mendelsohn maneuver, modified Shaker with CTAR ball, hard /k/ in isolation,  effortful swallow, etc.), with min cues in 5/5 sessions in order to improve the strength/agility/efficiency of his swallow for improved swallowing safety and QOL.    10/30:19: high /i/ x20 with modA , effortful swallow+chin tuck x15 , modified shaker with CTAR ball x20 5 sec hold with Michael  6. Patient will recall/demonstrate aspiration precautions and safe swallowing strategies with 100% acc when provided with occasional cues in 5/5 sessions (small sips/bites; chin tuck with all liquid intake; alternate liquids/solids; slow rate; NO straws, Sit upright at 90 degree angle during PO trials)  to decrease the reported incidences of dysphagia and s/sx of aspiration.    10/30:19: recalls aspiration precautions with 65% accuracy and Michael. He utilizes with po trials with modA pt taking large sips with piecemeal swallow - pt education to take small isolated cup sips utilizing chin tuck    7. Patient will tolerate trials of thin liquids (>4 ounces) using compensatory strategies without s/sx of aspiration/penetration when provided with Michael across 5/5 sessions.   10/30:19: tolerated 90% of trials of thin liquids (4oz) with modA for compensatory strategies. Pt presented with cough post swallow 1 instance likely d/t swallow initiation prior to tucking chin   8. Patient will tolerate regular solids with utilization of compensatory strategies without s/sx of aspiration/penetration or distress with Michael across 5/5 sessions.   10/30:19: not addressed this date - target next session     PLAN  [x]  Continue plan of care  []  Modify Goals/Treatment Plan      []  Discharge due to:  [] Other:    SILVERIO Bills 10/30/2019  9:49 AM    Future Appointments   Date Time Provider Marc Peg   10/30/2019 11:00 AM Chapo Red UFWLRYU [facility]   10/31/2019  9:00 AM Firman Escort, PTA [id number] [facility]   10/31/2019 11:00 AM Firman Escort, PTA MMCPTPB [facility]   11/1/2019  8:00 AM Firman Escort, PTA MMCPTPB [facility]   11/1/2019 10:30 AM SILVERIO Mills MMCPTPB [facility]   11/1/2019 11:15 AM Chapo Red RSGNHNM [facility]   11/4/2019  3:00 PM Firman Escort, PTA MMCPTPB [facility]   11/4/2019  4:00 PM Chapo Red KXVPXUY [facility]   11/6/2019  2:30 PM Firman Escort, PTA MMCPTPB [facility]   11/6/2019  3:15 PM Merryl Morning, OTR/L MMCPTPB [facility]   11/7/2019  9:00 AM Na Chamberlain MD [address]   11/8/2019  2:00 PM Chapo Red VUVAZUN [facility]   11/8/2019  3:00 PM Firman Escort, PTA MMCPTPB [facility]   11/8/2019  3:30 PM SILVERIO Diaz MMCPTPB [facility]   11/11/2019  1:00 PM Firman Escort, PTA MMCPTPB [facility]   11/11/2019  1:45 PM Chapo Red TMSDCEN [facility]   11/13/2019 10:30 AM Firman Escort, PTA MMCPTPB [facility]   11/13/2019 11:00 AM Merryl Morning, OTR/L MMCPTPB [facility]   11/13/2019 12:15 PM Lavonna Medicine, SLP VAYKYBL [facility]   11/15/2019  1:30 PM Firman Escort, PTA MMCPTPB [facility]   11/15/2019  2:00 PM Merryl Morning, OTR/L MMCPTPB [facility]   11/15/2019  2:45 PM Lavonna Medicine, SLP OCITJJE [facility]   11/19/2019  3:00 PM Firman Escort, PTA MMCPTPB [facility]   11/19/2019  3:30 PM Merryl Morning, OTR/L MMCPTPB [facility]   11/19/2019  4:45 PM Lavonna Medicine, SLP BTROCDY [facility]   11/20/2019  9:45 AM Lavonna Medicine, SLP DLSCXHO [facility]   11/20/2019 10:30 AM Firman Escort, PTA MMCPTPB [facility]   11/20/2019 11:00 AM Chapo Red ZHENYCJ [facility]   11/22/2019  3:00 PM Firman Escort, PTA MMCPTPB [facility]   11/22/2019  3:30 PM Chapo Red AXLSULI [facility]   11/22/2019  4:15 PM Jonathan Perez, SLP MMCPTPB [facility]   11/25/2019 12:00 PM Poli Turner, PT OWSPAAN [facility]   11/25/2019  1:00 PM Ellie Downs, OTR/L MMCPTPB [facility]   11/26/2019  3:00 PM Aleida Gonzalez PTA MMCPTPB [facility]   11/26/2019  4:15 PM Bryon Quinonez QNXAWNW [facility]   11/26/2019  5:30 PM SILVERIO Mina QBWSGRM [facility]   11/27/2019  3:00 PM Aleida Gonzalez PTA MMCPTPB [facility]   11/27/2019  4:00 PM Bryon Quinonez PJXLJGJ [facility]   12/13/2019  8:30 AM Deisy Perez MD [address]

## 2019-10-30 NOTE — PROGRESS NOTES
OT DAILY TREATMENT NOTE 10-18    Patient Name: Jose Zavala  Date:10/30/2019  : 1955  [x]  Patient  Verified  Payor: [place] MEDICARE / Plan: [place] MEDICARE PART A & B / Product Type: Medicare /    In time:1105  Out time:1145  Total Treatment Time (min): 40  Visit #: 3 of 24    Medicare/BCBS Only   Total Timed Codes (min):  40 1:1 Treatment Time:  40     Treatment Area: Upper extremity weakness [R29.898]  Cerebral infarction, unspecified [I63.9]    SUBJECTIVE  Pain Level (0-10 scale): 0/10  Any medication changes, allergies to medications, adverse drug reactions, diagnosis change, or new procedure performed?: [x] No    [] Yes (see summary sheet for update)  Subjective functional status/changes:   [] No changes reported  They think I should be able to get my arm back.   I like this place, I like how they give you 1 on 1 treatment    OBJECTIVE    40 min Neuromuscular Re-education:  []  See flow sheet :   Rationale: increase ROM and increase strength  to improve the patients ability to move right UE    Weightbearing on right palm push up   Sidelyingn right elbow flex/ext  Supine Right shoulder abduction   Seated: Recip shoulder flex/ext with elbow flex/ext  Right supination/pronation  Floor Stretch   Self Stretch Wrist flex/ext       With   [] TE   [] TA   [] neuro   [] other: Patient Education: [x] Review HEP    [] Progressed/Changed HEP based on:   [] positioning   [] body mechanics   [] transfers   [] heat/ice application   [] Splint wear/care   [] Sensory re-education   [] scar management      [] other:            Other Objective/Functional Measures:     Cueing for breathing required through out session      Pain Level (0-10 scale) post treatment: 0/10    ASSESSMENT/Changes in Function:  Trace supination on this date     Patient will continue to benefit from skilled OT services to modify and progress therapeutic interventions, address ROM deficits, address strength deficits, analyze and cue movement patterns and instruct in home and community integration to attain remaining goals. []  See Plan of Care  []  See progress note/recertification  []  See Discharge Summary         Progress towards goals / Updated goals:  1.  Patient will be independent in home exercise program for AAROM of right UE to reduce level of assist for self care Progressing 10/30/19  2.  Patient will be able to don right shoe with AFO with supervision. met 10/23/19  3.  Patient will be familiar with task modification to allow him to bathe left side without assist.     Long Term Goals: To be accomplished in 8 weeks:              **1.  Patient will be able to place right arm on table using active motion for use of right hand as assist.  2.  Patient will be Modified independent for all self care tasks. 3.  Patient will be able to use right hand as stabilizing assist for self care activities.   4.  Patient will report improved ability to use right hand to assist with home care as shown by reduced limitation of at least 20% on Neuro quality of Life.      PLAN  []  Upgrade activities as tolerated     [x]  Continue plan of care  []  Update interventions per flow sheet       []  Discharge due to:_  []  Other:_      DARELL Luna 10/30/2019  8:27 AM    Future Appointments   Date Time Provider Marc Ruvalcaba   10/31/2019 11:00 AM Sandra Billy, PTA [facility]CP[facility]   11/1/2019  8:00 AM Sandra Billy, PTA [facility]   11/1/2019 10:30 AM SILVERIO Carter [facility]   11/1/2019 11:15 AM Benedicto Jose NUSNTQC [facility]   11/4/2019  3:00 PM Sandra Billy PTA [facility]   11/4/2019  4:00 PM Benedicto Jose WXUHJUS [facility]   11/6/2019  2:30 PM Sandra Billy PTA [facility]   11/6/2019  3:15 PM RICHELLE Art [facility]   11/7/2019  9:00 AM MD Thang Álvarez    11/8/2019  2:00 PM Benedicto ZAIDI [facility]   11/8/2019  3:00 PM Sandra Billy PTA [facility]   11/8/2019  3:30 PM Mp Barroso, Gonzalez Anne, SLP [facility] [address]   11/11/2019  1:00 PM Jillene Narrow, PTA [facility] [address]   11/11/2019  1:45 PM Fiona Mata RYOEFWY [address]   11/13/2019 10:30 AM Jillene Narrow, PTA [facility] [address]   11/13/2019 11:00 AM Adra Dylon, OTR/L [facility] [address]   11/13/2019 12:15 PM Cristal Son, SLP YBAMFKD [address]   11/15/2019  1:30 PM Jillene Narrow, PTA [facility] [address]   11/15/2019  2:00 PM Adra Dylon, OTR/L [facility] [address]   11/15/2019  2:45 PM Cristal Son, SLP GALFCAZ [address]   11/19/2019  3:00 PM Jillene Narrow, PTA [facility] [address]   11/19/2019  3:30 PM Adra Dylon, OTR/L [facility] [address]   11/19/2019  4:45 PM Cristal Son, SLP FIBUOON [address]   11/20/2019  9:45 AM Cristal Son, SLP OQRHHIG [address]   11/20/2019 10:30 AM Jillene Narrow, PTA [facility] [address]   11/20/2019 11:00 AM Fiona Mata DCHZNMM [address]   11/22/2019  3:00 PM Jillene Narrow, PTA [facility] [address]   11/22/2019  3:30 PM Fiona Mata PQVXODM [address]   11/22/2019  4:15 PM Sim Mao, SLP [facility] [address]   11/25/2019 12:00 PM Nancy Izaguirre, PT [facility] [address]   11/25/2019  1:00 PM Adra Dylon, OTR/L [facility] [address]   11/26/2019  3:00 PM Jillene Narrow, PTA [facility] [address] [facility]   11/26/2019  4:15 PM Fiona Mata ZZGLKYA [address] [facility]   11/26/2019  5:30 PM SILVERIO Alicea [facility]WNORMA [facility]   11/27/2019  3:00 PM Myrtle Taveras PTA [facility]   11/27/2019  4:00 PM Fiona Mata RUKJJBZ [address] [facility]   12/13/2019  8:30 AM Jose Perez MD [address]

## 2019-10-30 NOTE — PROGRESS NOTES
PT DAILY TREATMENT NOTE 10-18    Patient Name: Mireya Gomez  Date:10/30/2019  : 1955  [x]  Patient  Verified  Payor: VA MEDICARE / Plan: VA MEDICARE PART A & B / Product Type: Medicare /    In time:800  Out time:830  Total Treatment Time (min): 30  Visit #: 3 of 16    Medicare/BCBS Only   Total Timed Codes (min):  30 1:1 Treatment Time:  30       Treatment Area: Lower extremity weakness [R29.898]  Cerebral infarction, unspecified [I63.9]    SUBJECTIVE  Pain Level (0-10 scale): 0/10  Any medication changes, allergies to medications, adverse drug reactions, diagnosis change, or new procedure performed?: [x] No    [] Yes (see summary sheet for update)  Subjective functional status/changes:   [] No changes reported  Pt stated that he is doing pretty good today and is ready for therapy    OBJECTIVE    30 min Therapeutic Exercise:  [x] See flow sheet :   Rationale: increase ROM and increase strength to improve the patients ability to increase ease with ADLs    With   [x] TE   [] TA   [] neuro   [] other: Patient Education: [x] Review HEP    [] Progressed/Changed HEP based on:   [] positioning   [] body mechanics   [] transfers   [] heat/ice application    [] other:      Other Objective/Functional Measures:   Had moderate difficulty with step taps today  Had no LOB with EO Romberg     Pain Level (0-10 scale) post treatment: 0/10    ASSESSMENT/Changes in Function:   Pt is slowly progressing toward goals. Pt cont to ambulate with quad cane. Pt came to therapy today with [employer] [place], but reported that he feels better with large base. Sit to stands are improving. Static balance has improved.  Cont with decreased right LE strength    Patient will continue to benefit from skilled PT services to modify and progress therapeutic interventions, address functional mobility deficits, address ROM deficits, address strength deficits, analyze and cue movement patterns, analyze and modify body mechanics/ergonomics, assess and modify postural abnormalities, address imbalance/dizziness and instruct in home and community integration to attain remaining goals. [x]  See Plan of Care  []  See progress note/recertification  []  See Discharge Summary         Progress towards goals / Updated goals:  Short Term Goals: To be accomplished in 1 weeks:               1. Pt will be compliant with a HEP to improve function.   Goal met. 10/23/19     Long Term Goals: To be accomplished in 8 weeks:               1. Pt will ambulate 4 steps x 3 with 1 HR with supervision to be able to navigate stairs at home.                2. Pt will increase FOTO score by 7 pts to improve LE function.                3. Pt will increase right LE hip, quad and HS strength to 4/5 or greater to be able to navigate community distances.                4. Pt will demonstrate Romberg EO/EC on a compliant surface to decrease fall risk during ADL's.                3/. Pt will perform sit to stand x 10 to ease with transfers safely. Progressing. 10/30/19               6. Pt will ambulate with LRAD >150 ft to be able to navigate short community distances.      PLAN  []  Upgrade activities as tolerated     [x]  Continue plan of care  []  Update interventions per flow sheet       []  Discharge due to:_  []  Other:_      Demetrice Son PTA 10/30/2019  7:59 AM    Future Appointments   Date Time Provider Marc Ruavlcaba   10/30/2019  8:00 AM Myrtle Taveras PTA [facility]   10/30/2019  9:45 AM SILVERIO Alicea COJMPTV [facility]   10/30/2019 11:00 AM Fiona WEST [facility]   10/31/2019  9:00 AM Myrtle Taveras PTA [facility]   10/31/2019 11:00 AM Myrtle Taveras PTA [facility]   11/1/2019  8:00 AM Myrtle Taveras, PTA [facility]   11/1/2019 10:30 AM SILVERIO Birmingham [facility]CPTPB [facility]   11/1/2019 11:15 AM Fiona Mata MHCWDBX [facility]   11/4/2019  3:00 PM Myrtle Taveras PTA [facility]   11/4/2019  4:00 PM Fiona Mata USZXSJD [facility]   11/6/2019 2:30 PM Aleida Gonzalez, PTA [facility]   11/6/2019  3:15 PM Ellie Downs, OTR/L [facility]   11/7/2019  9:00 AM Job Degree, MD [address]   11/8/2019  2:00 PM Bryon Quinonez TDDUGEV [facility]   11/8/2019  3:00 PM Aleida Gonzalez, PTA [facility]   11/8/2019  3:30 PM SILVREIO Mina [facility]CPTPB [facility]   11/11/2019  1:00 PM Aleida Gonzalez, PTA [facility]   11/11/2019  1:45 PM Bryon Quinonez KJNEBXJ [facility]   11/13/2019 10:30 AM Aleida Gonzalez, PTA [facility]   11/13/2019 11:00 AM Ellie Downs, OTR/L [facility]   11/13/2019 12:15 PM SILVERIO Mina WMRVQFS [facility]   11/15/2019  1:30 PM Aleida Gonzalez, PTA [facility]   11/15/2019  2:00 PM Ellie Downs, OTR/L [facility]   11/15/2019  2:45 PM SILVERIO Mina ZTWKLTM [facility]   11/19/2019  3:00 PM Aleida Gonzalez, PTA [facility]   11/19/2019  3:30 PM Ellie Downs, OTR/L [facility]   11/19/2019  4:45 PM Emerita Cordero SLP [facility]   11/20/2019  9:45 AM SILVERIO Mina BHJJWVC [facility]   11/20/2019 10:30 AM Aleida Gonzalez, PTA [facility]   11/20/2019 11:00 AM Bryon Quinonez OJPLQZX [facility]   11/22/2019  3:00 PM Aleida Gonzalez, PTA [facility]   11/22/2019  3:30 PM Bryon Quinonez SBUQTMR [facility]   11/22/2019  4:15 PM Ronaldo Lopez, SLP [facility]   11/25/2019 12:00 PM Андрей Deras, PT [facility]   11/25/2019  1:00 PM Ellie Downs, OTR/L [facility]   11/26/2019  3:00 PM Aleida Gonzalez, PTA [facility]   11/26/2019  4:15 PM Bryon Quinonez ZOCILNQ [facility]   11/26/2019  5:30 PM SILVERIO Mina BCCWNCV [facility]   11/27/2019  3:00 PM Aleida Gonzalez, PTA [facility]   11/27/2019  4:00 PM Bryon Quinonez HSXRNCV [facility]   12/13/2019  8:30 AM Deisy Perez MD [address]

## 2019-10-31 ENCOUNTER — HOSPITAL ENCOUNTER (OUTPATIENT)
Dept: PHYSICAL THERAPY | Age: 64
Discharge: HOME | End: 2019-10-31
Payer: MEDICARE

## 2019-10-31 ENCOUNTER — APPOINTMENT (OUTPATIENT)
Dept: PHYSICAL THERAPY | Age: 64
End: 2019-10-31
Payer: MEDICARE

## 2019-10-31 PROCEDURE — 92507 TX SP LANG VOICE COMM INDIV: CPT

## 2019-10-31 PROCEDURE — 97110 THERAPEUTIC EXERCISES: CPT

## 2019-10-31 NOTE — PROGRESS NOTES
PT DAILY TREATMENT NOTE 10-18    Patient Name: Pati Anand  Date:10/31/2019  : 1955  [x]  Patient  Verified  Payor: VA MEDICARE / Plan: VA MEDICARE PART A & B / Product Type: Medicare /    In time:1055  Out time:1135  Total Treatment Time (min): 40  Visit #: 4 of 16    Medicare/BCBS Only   Total Timed Codes (min):  40 1:1 Treatment Time:  40       Treatment Area: Lower extremity weakness [R29.898]  Cerebral infarction, unspecified [I63.9]    SUBJECTIVE  Pain Level (0-10 scale): 0/10  Any medication changes, allergies to medications, adverse drug reactions, diagnosis change, or new procedure performed?: [x] No    [] Yes (see summary sheet for update)  Subjective functional status/changes:   [] No changes reported  Pt stated that he is doing good today    OBJECTIVE    40 min Therapeutic Exercise:  [x] See flow sheet :   Rationale: increase ROM and increase strength to improve the patients ability to increase ease with walking and ADLs    With   [x] TE   [] TA   [] neuro   [] other: Patient Education: [x] Review HEP    [] Progressed/Changed HEP based on:   [] positioning   [] body mechanics   [] transfers   [] heat/ice application    [] other:      Other Objective/Functional Measures:   Pt struggled with step ups today  Performed SLR on the right with less ext lag today  Was fatigued with sit to stands having right foot back     Pain Level (0-10 scale) post treatment: 0/10    ASSESSMENT/Changes in Function:   Pt is slowly progressing toward goals. Pt cont to use SBQC with ambulation. Cont with weakness in the right LE. Sit to stands are improving.  Cont with decreased walking tolerance    Patient will continue to benefit from skilled PT services to modify and progress therapeutic interventions, address functional mobility deficits, address ROM deficits, address strength deficits, analyze and cue movement patterns, analyze and modify body mechanics/ergonomics, assess and modify postural abnormalities and instruct in home and community integration to attain remaining goals. [x]  See Plan of Care  []  See progress note/recertification  []  See Discharge Summary         Progress towards goals / Updated goals:  Short Term Goals: To be accomplished in 1 weeks:               1. Pt will be compliant with a HEP to improve function.   Goal met. 10/23/19     Long Term Goals: To be accomplished in 8 weeks:               1. Pt will ambulate 4 steps x 3 with 1 HR with supervision to be able to navigate stairs at home.                2. Pt will increase FOTO score by 7 pts to improve LE function.                3. Pt will increase right LE hip, quad and HS strength to 4/5 or greater to be able to navigate community distances.                4. Pt will demonstrate Romberg EO/EC on a compliant surface to decrease fall risk during ADL's.                4/. Pt will perform sit to stand x 10 to ease with transfers safely. Progressing.  10/30/19               6. Pt will ambulate with LRAD >150 ft to be able to navigate short community distances.     PLAN  []  Upgrade activities as tolerated     [x]  Continue plan of care  []  Update interventions per flow sheet       []  Discharge due to:_  []  Other:_      Fay Leums PTA 10/31/2019  11:03 AM    Future Appointments   Date Time Provider Marc Ruvalcaba   11/1/2019  8:00 AM Romana Rea PTA [facility]   11/1/2019 10:30 AM SILVERIO Mclean [facility]TP[facility]   11/1/2019 11:15 AM Giselle Ramos OTR/L [facility]   11/4/2019  3:00 PM Romana Rea PTA [facility]   11/4/2019  4:00 PM Lainey NUNEZ [facility]   11/6/2019  2:30 PM Romana Rea PTA [facility]   11/6/2019  3:15 PM Giselle Ramos OTR/L [facility]   11/7/2019  9:00 AM Arie Purdy MD [address]   11/8/2019  2:00 PM Lainey Rojo SBURH [facility]   11/8/2019  3:00 PM Romana Rea PTA [facility]   11/8/2019  3:30 PM [facility]   11/11/2019  1:00 PM Josh Diego, PTA [facility]   11/11/2019  1:45 PM [facility] LMBSOAY [facility]   11/13/2019 10:30 AM Josh Diego, PTA [facility]   11/13/2019 11:00 AM Thedore Cool, OTR/L [facility]   11/13/2019 12:15 PM SILVERIO Amato PPCQNXR [facility]   11/15/2019  1:30 PM Josh Diego, PTA [facility]   11/15/2019  2:00 PM Thedore Cool, OTR/L [facility]   11/15/2019  2:45 PM SILVERIO Amato YUQJNVC [facility]   11/19/2019  3:00 PM Josh Diego, PTA [facility]   11/19/2019  3:30 PM Thedore Cool, OTR/L [facility]   11/19/2019  4:45 PM SILVERIO Amato WOHFXEQ [facility]   11/20/2019  9:45 AM SILVERIO Amato GENFVLR [facility]   11/20/2019 10:30 AM Josh Diego, PTA [facility]   11/20/2019 11:00 AM [facility] XIJYRPF [facility]   11/22/2019  3:00 PM Josh Diego, PTA [facility]   11/22/2019  3:30 PM [facility] PYEXPIS [facility]   11/22/2019  4:15 PM SILVERIO Victoria MMCPTPB [facility]   11/25/2019 12:00 PM Sumeet Fry, PT [facility]   11/25/2019  1:00 PM Lei Parker, OTR/L [facility]   11/26/2019  3:00 PM Josh Diego PTA [facility]   11/26/2019  4:15 PM [facility] [id number] [facility]   11/26/2019  5:30 PM Jose Traore, SLP TVDEGJP [facility]   11/27/2019  3:00 PM Josh Diego PTA [facility]   11/27/2019  4:00 PM [facility] [id number] [facility]   12/13/2019  8:30 AM Anyi Perez MD [address]

## 2019-11-01 ENCOUNTER — HOSPITAL ENCOUNTER (OUTPATIENT)
Dept: PHYSICAL THERAPY | Age: 64
Discharge: HOME | End: 2019-11-01
Payer: MEDICARE

## 2019-11-01 ENCOUNTER — APPOINTMENT (OUTPATIENT)
Dept: PHYSICAL THERAPY | Age: 64
End: 2019-11-01
Payer: MEDICARE

## 2019-11-01 PROCEDURE — 97032 APPL MODALITY 1+ESTIM EA 15: CPT

## 2019-11-01 PROCEDURE — 92526 ORAL FUNCTION THERAPY: CPT

## 2019-11-01 PROCEDURE — 97112 NEUROMUSCULAR REEDUCATION: CPT

## 2019-11-01 NOTE — PROGRESS NOTES
OT DAILY TREATMENT NOTE 10-18    Patient Name: Josh Casillas  Date:2019  : 1955  [x]  Patient  Verified  Payor: [place] MEDICARE / Plan: [place] MEDICARE PART A & B / Product Type: Medicare /    In time:115  Out time:1200  Total Treatment Time (min): 45  Visit #: 4 of 24    Medicare/BCBS Only   Total Timed Codes (min):  45 1:1 Treatment Time:  39     Treatment Area: Upper extremity weakness [R29.898]  Cerebral infarction, unspecified [I63.9]    SUBJECTIVE  Pain Level (0-10 scale): 0/10  Any medication changes, allergies to medications, adverse drug reactions, diagnosis change, or new procedure performed?: [x] No    [] Yes (see summary sheet for update)  Subjective functional status/changes:   [] No changes reported  My wrist doesn't hurt-it has been creaky like that for a long time    OBJECTIVE    Modality rationale: increase muscle contraction/control to improve the patients ability to flex elbow and extend wrist   Min Type Additional Details    [] Estim:  []Unatt       []IFC  []Premod                        []Other:  []w/ice   []w/heat  Position:  Location:   15 [] Estim: []Att    []TENS instruct  [x]NMES                    []Other:  []w/US   []w/ice   []w/heat  Position:elbow flexion wrist extension  Location:    []  Traction: [] Cervical       []Lumbar                       [] Prone          []Supine                       []Intermittent   []Continuous Lbs:  [] before manual  [] after manual    []  Ultrasound: []Continuous   [] Pulsed                           []1MHz   []3MHz W/cm2:  Location:    []  Iontophoresis with dexamethasone         Location: [] Take home patch   [] In clinic    []  Ice     []  heat  []  Ice massage  []  Laser   []  Anodyne Position:  Location:    []  Laser with stim  []  Other:  Position:  Location:    []  Vasopneumatic Device Pressure:       [] lo [] med [] hi   Temperature: [] lo [] med [] hi       [x] Skin assessment post-treatment:  [x]intact []redness- no adverse reaction []redness - adverse reaction:      30 min Neuromuscular Re-education:  []  See flow sheet :   Rationale: increase ROM and increase strength  to improve the patients ability to move right arm  Seated weightbearing push up x 10 right  Right elbow recip flex ext, shoulder flex ext, forearm sup/pro  with facilitation to increase ROM  Matched resistance provided to shouler and elbow motions of right UE    With   [] TE   [] TA   [] neuro   [] other: Patient Education: [x] Review HEP    [] Progressed/Changed HEP based on: tone will decrease if thumb abducted, gear shift activity  [] positioning   [] body mechanics   [] transfers   [] heat/ice application   [] Splint wear/care   [] Sensory re-education   [] scar management      [] other:            Other Objective/Functional Measures:   REsponse with NMES to elbow flexion and wrist extension but not through full ROM     Pain Level (0-10 scale) post treatment: 0/10    ASSESSMENT/Changes in Function: Improving elbow flexion following NMES    Patient will continue to benefit from skilled OT services to modify and progress therapeutic interventions, address ROM deficits, address strength deficits, analyze and address soft tissue restrictions and instruct in home and community integration to attain remaining goals. []  See Plan of Care  []  See progress note/recertification  []  See Discharge Summary         Progress towards goals / Updated goals:  .  Patient will be independent in home exercise program for AAROM of right UE to reduce level of assist for self care Progressing 10/30/19  2.  Patient will be able to don right shoe with AFO with supervision. met 10/23/19  3.  Patient will be familiar with task modification to allow him to bathe left side without assist.     Long Term Goals: To be accomplished in 8 weeks:              **1.  Patient will be able to place right arm on table using active motion for use of right hand as assist.still with poor active elbow flexion 11/1/19  2.  Patient will be Modified independent for all self care tasks. progressing 11/1/19  3.  Patient will be able to use right hand as stabilizing assist for self care activities.   4.  Patient will report improved ability to use right hand to assist with home care as shown by reduced limitation of at least 20% on Neuro quality of Life.     PLAN  []  Upgrade activities as tolerated     [x]  Continue plan of care  []  Update interventions per flow sheet       []  Discharge due to:_  []  Other:_      MALLIKA Stinson/L 11/1/2019  1:10 PM    Future Appointments   Date Time Provider Marc Ruvalcaba   11/4/2019 10:45 AM SILVERIO Griffith MMCPTPB [facility]   11/4/2019 11:30 AM Martha Ballard PT [facility]CPTPB [facility]   11/4/2019 12:00 PM Funmilayo Pruitt YFMCGJL [facility]   11/6/2019  3:15 PM Funmilayo Pruitt VNQLKWV [facility]   11/7/2019  9:00 AM Wolf Lopez MD [address]   11/8/2019  2:00 PM Funmilayo Pruitt [id number] [facility]   11/8/2019  3:00 PM Rut Loredo PTA [facility]CPTP[facility]   11/8/2019  3:30 PM SILVERIO Epps MMCPTPB [facility]   11/11/2019 12:15 PM SILVERIO Griffith [facility]CPTPB [facility]   11/11/2019  1:00 PM Rut Loredo PTA [facility]CP[facility]   11/11/2019  1:45 PM Funmilayo Pruitt YLDVZBO [facility]   11/13/2019 11:00 AM MALLIKA Gonzalez/ROVERTO MMCPTPB [facility]   11/13/2019 12:15 PM Dominga Bourne I, SLP [facility]   11/15/2019  1:30 PM Rut Loredo, PTA [facility]   11/15/2019  2:00 PM Nishant Boop, OTR/L [facility]CP[facility]   11/15/2019  2:45 PM Fly Grider SLP [facility]CP[facility]   11/19/2019  3:00 PM Rut Loredo, PTA [facility]   11/19/2019  3:30 PM Nishant Leeop, OTR/L [facility]   11/19/2019  4:45 PM SILVERIO Epps MMCPTPB [facility]   11/20/2019  9:45 AM Fly Grider SLP [facility]   11/20/2019 10:30 AM Rut Loredo, PTA [facility]   11/20/2019 11:00 AM Funmilayo Pruitt [id number] [facility]   11/22/2019  3:00 PM Rut Loredo, PTA [facility]CPTP[facility]   11/22/2019  3:30 PM Funmilayo Pruitt NZHVHZS [facility] 11/22/2019  4:15 PM Osorio Rome, SLP [facility]CP[facility]   11/25/2019 12:00 PM Familiaalyce Hinds, PT [facility]CPTPB [facility]   11/25/2019  1:00 PM Roland Mariee, OTR/L [facility]CPTPB [facility]   11/25/2019  1:45 PM Preston Du, SLP KWBGDPR [facility]   11/26/2019  1:45 PM Preston Du, SLP UXYBNWB [facility]   11/26/2019  2:45 PM Arlys Stacks WUYQQTG [facility]   11/27/2019  3:00 PM Marcela Hinojosa, PTA [facility]CPTPB [facility]   11/27/2019  4:00 PM Arlys Stacks PQPHDFD [facility]   12/13/2019  8:30 AM Ernesto Perez MD [address]

## 2019-11-01 NOTE — PROGRESS NOTES
ST DAILY TREATMENT NOTE    Patient Name: Sondra Campbell  Date:2019  : 1955  [x]  Patient  Verified  Payor: Payor: [place] MEDICARE / Plan: [place] MEDICARE PART A & B / Product Type: Medicare /   In time:1036  Out time: 1115  Total Treatment Time (min): 39  Visit #: 4 of 24    Treatment Diagnosis: Cerebral infarction, unspecified [I63.9]    SUBJECTIVE  Pain Level (0-10 scale): 0  Any medication changes, allergies to medications, adverse drug reactions, diagnosis change, or new procedure performed?: [x] No    [] Yes (see summary sheet for update)    Subjective functional status/changes:   [x] No changes reported    OBJECTIVE  Treatment provided includes:  Increase/Improve:  []  Voice Quality []  Cognitive Linguistic Skills [x]  Laryngeal/Pharyngeal Exercises   []  Vocal Loudness []  Reading Comprehension [x]  Swallowing Skills    []  Vocal Cord Function []  Auditory Comprehension [x]  Oral Motor Skills   []  Resonance []  Writing Skills [x]  Compensatory strategies    [x]  Speech Intelligibility []  Expressive Language []  Attention   []  Breath Support/Coord.  []  Receptive language []  Memory   []  Articulation []  Safety Awareness [x] Patient Education   []  Fluency []  Word Retrieval []        Treatment Provided:  - Patient Education  - Oropharyngeal exercises  - voiced /th/ with biofeedback at the word level      Patient/Caregiver  Education: [x] Review HEP      HEP/Handouts given: voiced /th/ words and sentences    Pain Level (0-10 scale) post treatment: 0    ASSESSMENT   []   Improving appropriately and progressing toward goals  [x]   Improving slowly and progressing toward goals  []   Approximating goals/maximum potential  [x]   Continues to benefit from skilled therapy to address remaining functional deficits  []   Not progressing toward goals and plan of care will be adjusted    Patient will continue to benefit from skilled therapy to address remaining functional deficits: Dysarthria, dysphagia    Progress towards goals / Updated goals:  1. The pt will increase quality and length of phonation by sustaining ah utilizing effective diaphragmatic breath support for >10 seconds in 3/3 sessions to increase functional speech intelligibility. 11/1/19: Not targeted    2. Pt will demonstrate ability to produce improve prosody, rhyme, rhythm in structured sentences, word emphasis tasks, and varying pitch phrases using various comp strategies with 80% acc given min-mod cues in 3/3opportunities to improve naturalness of speech. 11/1/19: Not targeted, address next session    3. The patient will articulate  (s/z) consonants at the word level- phrases level  with 90% acc with use of comp strategies and OMES  to improve speech intelligibility to > 90% acc when provided with min cues. 11/1/19: Not targeted, address next session      4. The patient will articulate  (l, voiced /th/ consonants at the word level- phrases level  with 90% acc with use of comp strategies and OMES  to improve speech intelligibility to > 90% acc when provided with min cues. 11/1/19: voiced /th/ consonants: 1-syllable 80% with min cues; 2-3 syllable- 60% with min-mod cues; Utilization of biofeedback to improve articulatory placement       5. Patient will complete tongue base retraction,and laryngeal/pharyngeal strengthening exercises (including Sheyla maneuver, Mendelsohn maneuver, modified Shaker with CTAR ball, hard /k/ in isolation,  effortful swallow, etc.), with min cues in 5/5 sessions in order to improve the strength/agility/efficiency of his swallow for improved swallowing safety and QOL. 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  Patient will recall/demonstrate aspiration precautions and safe swallowing strategies with 100% acc when provided with occasional cues in 5/5 sessions (small sips/bites; chin tuck with all liquid intake; alternate liquids/solids; slow rate; NO straws, Sit upright at 90 degree angle during PO trials)  to decrease the reported incidences of dysphagia and s/sx of aspiration. 11/1/19:  Recalled strategies x80% with min cues    7. Patient will tolerate trials of thin liquids (>4 ounces) using compensatory strategies without s/sx of aspiration/penetration when provided with Michael across 5/5 sessions. 11/1/19:  Patient tolerated ~6 oz of water with utilization of chin tuck x100% and no s/sx of aspiration     8. Patient will tolerate regular solids with utilization of compensatory strategies without s/sx of aspiration/penetration or distress with Michael across 5/5 sessions.   11/1/19: Not addressed this date, continue next session    PLAN  [x]  Continue plan of care  []  Modify Goals/Treatment Plan      []  Discharge due to:  [] Other:    SILVERIO Jorgensen 11/1/2019  8:44 AM    Future Appointments   Date Time Provider Marc Ruvalcaba   11/1/2019 10:30 AM SILVERIO Bourgeois [facility]CPTPB [facility]   11/1/2019 11:15 AM MALLIKA Logan/L [facility]B [facility]   11/4/2019 10:45 AM Darryle Officer, SILVERIO MMCPTPB [facility]   11/4/2019 11:30 AM Peg Burgos PT [facility]   11/4/2019 12:00 PM Jasmin Crew [id number] [facility]   11/6/2019  3:15 PM Jasmin Crew BYZKVFT [facility]   11/7/2019  9:00 AM Christina Welch MD [address]   11/8/2019  2:00 PM Jasmin Crew [id number] [facility]   11/8/2019  3:00 PM Linda Joel PTA [facility]   11/8/2019  3:30 PM SILVERIO Esqueda MMCPTPB [facility]   11/11/2019 12:15 PM Darryle Officer, SILVERIO MMCPTPB [facility]   11/11/2019  1:00 PM Linda Joel PTA [facility]   11/11/2019  1:45 PM Jasmin Choudhary YGPQAZY [facility]   11/13/2019 11:00 AM Sen Contreras OTR/L [facility]   11/13/2019 12:15 PM Tish Arce I SLP [facility]   11/15/2019  1:30 PM Marcela Hinojosa, PTA [facility]   11/15/2019  2:00 PM Roland Mariee, OTR/L [facility]   11/15/2019  2:45 PM Preston Du, SILVERIO YBCSWTR [facility]   11/19/2019  3:00 PM Marcela Hinojosa, PTA [facility]   11/19/2019  3:30 PM Roland Mariee, OTR/L [facility]   11/19/2019  4:45 PM Preston Du, SLP [facility]CP[facility]B [facility]   11/20/2019  9:45 AM Preston Du, SILVERIO DXYVLDH [facility]   11/20/2019 10:30 AM Marcela Hinojosa, PTA [facility]CPTP[facility]   11/20/2019 11:00 AM Arlys Stacks VTHCUOL [facility]   11/22/2019  3:00 PM Marcela Hinojosa, PTA [facility]CPTPB [facility]   11/22/2019  3:30 PM Arlys Stacks GHCEOYX [facility]   11/22/2019  4:15 PM Osorio Rome, SLP [facility]   11/25/2019 12:00 PM Priyank Hinds, PT [facility]   11/25/2019  1:00 PM Roland Mariee, OTR/L [facility]CPTPB [facility]   11/25/2019  1:45 PM Preston Du, SLP MOAKVCV [facility]   11/26/2019  1:45 PM Preston Du, SLP XMUOYSP [facility]   11/26/2019  2:45 PM Arlys Stacks WITEHOR [facility]   11/27/2019  3:00 PM Marcela Hinojosa PTA MMCPTPB [facility]   11/27/2019  4:00 PM Jama Patel CYNAMVG [facility]   12/13/2019  8:30 AM Ernesto Perez MD [address]

## 2019-11-04 ENCOUNTER — HOSPITAL ENCOUNTER (OUTPATIENT)
Dept: PHYSICAL THERAPY | Age: 64
Discharge: HOME | End: 2019-11-04
Payer: MEDICARE

## 2019-11-04 ENCOUNTER — APPOINTMENT (OUTPATIENT)
Dept: PHYSICAL THERAPY | Age: 64
End: 2019-11-04
Payer: MEDICARE

## 2019-11-04 PROCEDURE — 92507 TX SP LANG VOICE COMM INDIV: CPT

## 2019-11-04 PROCEDURE — 97112 NEUROMUSCULAR REEDUCATION: CPT

## 2019-11-04 PROCEDURE — 97110 THERAPEUTIC EXERCISES: CPT

## 2019-11-04 PROCEDURE — 92526 ORAL FUNCTION THERAPY: CPT

## 2019-11-04 NOTE — PROGRESS NOTES
PT DAILY TREATMENT NOTE 10-18    Patient Name: Sondra Campbell  Date:2019  : 1955  [x]  Patient  Verified  Payor: [place] MEDICARE / Plan: [place] MEDICARE PART A & B / Product Type: Medicare /    In time:1138  Out time:1204  Total Treatment Time (min): 26  Visit #: 5 of 24    Medicare/BCBS Only   Total Timed Codes (min):  26 1:1 Treatment Time:  26       Treatment Area: Lower extremity weakness [R29.898]  Cerebral infarction, unspecified [I63.9]    SUBJECTIVE  Pain Level (0-10 scale): 0/10  Any medication changes, allergies to medications, adverse drug reactions, diagnosis change, or new procedure performed?: [x] No    [] Yes (see summary sheet for update)  Subjective functional status/changes:   [] No changes reported  Reports he is doing well and his wife helps him with his ex's at home. OBJECTIVE    26 min Therapeutic Exercise:  [] See flow sheet :   Rationale: increase ROM, increase strength, improve coordination and improve balance to improve the patients ability to ease with ADL's     With   [] TE   [] TA   [] neuro   [] other: Patient Education: [x] Review HEP    [] Progressed/Changed HEP based on:   [] positioning   [] body mechanics   [] transfers   [] heat/ice application    [] other:      Other Objective/Functional Measures: 10 sit to stand =1:10. Pt had difficulty at [id number] standing up balance. Able to perform static standing with EC x 30 sec with supervision. Added PNF seated, D1 is a challenge. Pain Level (0-10 scale) post treatment: 0/10    ASSESSMENT/Changes in Function: Progressing well. Challenged with taps/steps on a higher box.      Patient will continue to benefit from skilled PT services to modify and progress therapeutic interventions, address functional mobility deficits, address ROM deficits, address strength deficits, analyze and address soft tissue restrictions, analyze and cue movement patterns, analyze and modify body mechanics/ergonomics, assess and modify postural abnormalities and address imbalance/dizziness to attain remaining goals. [x]  See Plan of Care  []  See progress note/recertification  []  See Discharge Summary         Progress towards goals / Updated goals:  Short Term Goals: To be accomplished in 1 weeks:               1. Pt will be compliant with a HEP to improve function.   Goal met. 10/23/19     Long Term Goals: To be accomplished in 8 weeks:               1. Pt will ambulate 4 steps x 3 with 1 HR with supervision to be able to navigate stairs at home.                2. Pt will increase FOTO score by 7 pts to improve LE function.                3. Pt will increase right LE hip, quad and HS strength to 4/5 or greater to be able to navigate community distances.                4. Pt will demonstrate Romberg EO/EC on a compliant surface to decrease fall risk during ADL's.                3/. Pt will perform sit to stand x 10 to ease with transfers safely. Progressing. 10/30/19  MET.  19               6. Pt will ambulate with LRAD >150 ft to be able to navigate short community distances.     PLAN  [x]  Upgrade activities as tolerated     [x]  Continue plan of care  []  Update interventions per flow sheet       []  Discharge due to:_  []  Other:_      Chapin Gann, PT 2019  1:12 PM    Future Appointments   Date Time Provider Marc Ruvalcaba   2019  3:15 PM MALLIKA Art/ROVERTO MMCPTPB [facility]   2019  9:00 AM Zeina Clifton MD [address]   2019  2:00 PM Benedicto SUERO [facility]   2019  3:00 PM Sandra Billy PTA [facility]   2019  3:30 PM SILVERIO Shook MMCPTPB [facility]   2019 12:15 PM SILVERIO Goyal MMCPTPB [facility]   2019  1:00 PM Sandra Billy PTA [facility]   2019  1:45 PM Benedicto Salamancaaway PJDTVWL [facility]   2019 11:00 AM Russell Garcia OTR/ROVERTO MMCPTPB [facility]   2019 12:15 PM SILVERIO Campoverde MMCPTPB [facility]   11/15/2019  1:30 PM Sandra Billy PTA [facility]CPTPB [facility] 11/15/2019  2:00 PM Christiano Harper, OTR/L [facility]TPB [facility]   11/15/2019  2:45 PM Hilda Bates, SILVERIO JLRWLQO [facility]   11/19/2019  3:00 PM Rachel Pereira, PTA [facility]CPTPB [facility]   11/19/2019  3:30 PM Christiano Harper, OTR/L [facility]B [facility]   11/19/2019  4:45 PM Hilda Bates, SILVERIO NONNCTV [facility]   11/20/2019  9:45 AM SILVERIO Rolon MJLZZIE [facility]   11/20/2019 10:30 AM Rachel Randall, PTA [facility]CPTPB [facility]   11/20/2019 11:00 AM Lorrie Schaefer XUJSRSR [facility]   11/22/2019  3:00 PM Rachel Randall, PTA [facility]   11/22/2019  3:30 PM Lorrie Schaefer YKWBMMR [facility]   11/22/2019  4:15 PM Nela Golden, SLP [facility]   11/25/2019 12:00 PM Aaron Jones, PT [facility]   11/25/2019  1:00 PM Christiano Harper, OTR/L [facility]B [facility]   11/25/2019  1:45 PM Hilda Bates, SILVERIO QIFFUDFERNANDEZ [facility]   11/26/2019  1:45 PM SILVERIO Rolon UJOMAURY [facility]   11/26/2019  2:45 PM Lorrie Schaefer FGQBJQA [facility]   11/27/2019  3:00 PM Rachel Pereira PTA MMCPTPB [facility]   11/27/2019  4:00 PM Lorrie Schaefer CMKHYIP [facility]   12/13/2019  8:30 AM Rossy Perez MD [address]

## 2019-11-04 NOTE — PROGRESS NOTES
OT DAILY TREATMENT NOTE 10-18    Patient Name: Brittany Michael  Date:2019  : 1955  [x]  Patient  Verified  Payor: [place] MEDICARE / Plan: [place] MEDICARE PART A & B / Product Type: Medicare /    In time:1205  Out time:1235  Total Treatment Time (min): 30  Visit #: 5 of 24    Medicare/BCBS Only   Total Timed Codes (min):  30 1:1 Treatment Time:  30     Treatment Area: Upper extremity weakness [R29.898]  Cerebral infarction, unspecified [I63.9]    SUBJECTIVE  Pain Level (0-10 scale): 0/10  Any medication changes, allergies to medications, adverse drug reactions, diagnosis change, or new procedure performed?: [x] No    [] Yes (see summary sheet for update)  Subjective functional status/changes:   [] No changes reported  I do these at home with my wife     OBJECTIVE    30 min Neuromuscular Re-education:  []  See flow sheet :   Rationale: increase ROM and increase strength  to improve the patients ability to functionally use Right UE    Seated weightbearing push up  Right elbow recip flex/ext, sup/pro, shoulder flex/ext to increase rom   Matched resistance proved to shoulder and elbow motions of right UE  Retraction/elevation with mirror         With   [] TE   [] TA   [] neuro   [] other: Patient Education: [x] Review HEP    [] Progressed/Changed HEP based on:   [] positioning   [] body mechanics   [] transfers   [] heat/ice application   [] Splint wear/care   [] Sensory re-education   [] scar management      [] other:            Other Objective/Functional Measures:     Intermittent tremors in Right UE throughout session      Pain Level (0-10 scale) post treatment: 0/10    ASSESSMENT/Changes in Function: AROM shoulder flexion improving     Patient will continue to benefit from skilled OT services to modify and progress therapeutic interventions, address ROM deficits, address strength deficits and instruct in home and community integration to attain remaining goals.      []  See Plan of Care  []  See progress note/recertification  []  See Discharge Summary         Progress towards goals / Updated goals:  1.  Patient will be independent in home exercise program for AAROM of right UE to reduce level of assist for self care   Status at Eval/Last Progress Note:  Status at Last Visit:Progressing 11/4/19    2.  Patient will be able to don right shoe with AFO with supervision. Status at Eval/Last Progress Note:  Status at Last Visit:met 10/23/19    3.  Patient will be familiar with task modification to allow him to bathe left side without assist.  Status at Eval/Last Progress Note:  Status at Last Visit:    [address] accomplished [place] will be able to place right arm on table using active motion for use of right hand as assist  Status at Eval/Last Progress Note:  Status at Last Visit:.still with poor active elbow flexion 11/1/19    2.  Patient will be Modified independent for all self care tasks. Status at Eval/Last Progress Note:  Status at Last Visit:progressing 11/1/19    3.  Patient will be able to use right hand as stabilizing assist for self care activities.   Status at Eval/Last Progress Note:  Status at Last Visit:    4.  Patient will report improved ability to use right hand to assist with home care as shown by reduced limitation of at least 20% on Neuro quality of Life.   Status at Eval/Last Progress Note: 82% limitation   Status at Last Visit:    PLAN  []  Upgrade activities as tolerated     [x]  Continue plan of care  []  Update interventions per flow sheet       []  Discharge due to:_  []  Other:_      JUSTIN Melo/L 11/4/2019  10:39 AM    Future Appointments   Date Time Provider Marc Ruvalcaba   11/6/2019  3:15 PM MALLIKA Gonzalez/L MMCPTPB [facility]   11/7/2019  9:00 AM Marleny Holbrook MD [address]   11/8/2019  2:00 PM Richi Monteiro [id number] [facility]   11/8/2019  3:00 PM Sally Kumar PTA MMCPTPB [facility]   11/8/2019  3:30 PM Gema Burger, SLP [id number] [facility]   11/11/2019 12:15 PM Flor Staton, SILVERIO BAJECLH [facility]   11/11/2019  1:00 PM Romana Rea, PTA MMCPTPB [facility]   11/11/2019  1:45 PM Lainey [place] [id number] [facility]   11/13/2019 11:00 AM Giselle Ramos, OTR/L MMCPTPB [facility]   11/13/2019 12:15 PM Aretha Barton, SLP [id number] [facility]   11/15/2019  1:30 PM Romana Rea, PTA MMCPTPB [facility]   11/15/2019  2:00 PM Giselle Ramos, OTR/L MMCPTPB [facility]   11/15/2019  2:45 PM Aretha Barton, SILVERIO [id number] [facility]   11/19/2019  3:00 PM Romana Rea, PTA MMCPTPB [facility]   11/19/2019  3:30 PM Giselle Ramos, OTR/L MMCPTPB [facility]   11/19/2019  4:45 PM SILVERIO Stout QOTATJL [facility]   11/20/2019  9:45 AM SILVERIO Stout FCRTRDV [facility]   11/20/2019 10:30 AM Romana Rea, PTA MMCPTPB [facility]   11/20/2019 11:00 AM Lainey [place] IGBZRWV [facility]   11/22/2019  3:00 PM Romana Rea, PTA MMCPTPB [facility]   11/22/2019  3:30 PM Lainey [place] [id number] [facility]   11/22/2019  4:15 PM Oralia Nye, SLP MMCPTPB [facility]   11/25/2019 12:00 PM Wellington Hurtado, PT MMCPTPB [facility]   11/25/2019  1:00 PM Giselle Ramos, OTR/L MMCPTPB [facility]   11/25/2019  1:45 PM Aretha Barton, SILVERIO NMZWCPW [facility]   11/26/2019  1:45 PM Aretha Barton, SILVERIO LACHGFW [facility]   11/26/2019  2:45 PM Lainey [place] [id number] [facility]   11/27/2019  3:00 PM Romana Rea PTA MMCPTPB [facility]   11/27/2019  4:00 PM Lainey [place] CEXESYY [facility]   12/13/2019  8:30 AM Christy Perez MD [address]

## 2019-11-04 NOTE — PROGRESS NOTES
ST DAILY TREATMENT NOTE    Patient Name: Gifty Garcia  Date:2019  : 1955  [x]  Patient  Verified  Payor: Payor: [place] MEDICARE / Plan: VA MEDICARE PART A & B / Product Type: Medicare /   In time:10: 39 Out time: 11:33  Total Treatment Time (min): 48  Visit #: 5 of 24    Treatment Diagnosis: Cerebral infarction, unspecified [I63.9]    SUBJECTIVE  Pain Level (0-10 scale): 0  Any medication changes, allergies to medications, adverse drug reactions, diagnosis change, or new procedure performed?: [x] No    [] Yes (see summary sheet for update)    Subjective functional status/changes:   [x] No changes reported  Patient stated that he practiced his HEP twice each day over the weekend- he did well with the  words. . He stated that he practices daily when he doesn't have treatment. It took patient ~4 minutes to walk the distance to the tx room. During that time this SLP informally assessed patient's speech, asked questions about his swallowing, HEP. OBJECTIVE  Treatment provided includes:  Increase/Improve:  []  Voice Quality []  Cognitive Linguistic Skills [x]  Laryngeal/Pharyngeal Exercises   []  Vocal Loudness []  Reading Comprehension [x]  Swallowing Skills    []  Vocal Cord Function []  Auditory Comprehension [x]  Oral Motor Skills   []  Resonance []  Writing Skills [x]  Compensatory strategies    [x]  Speech Intelligibility []  Expressive Language []  Attention   [x]  Breath Support/Coord.  []  Receptive language []  Memory   []  Articulation []  Safety Awareness [x] Patient Education   []  Fluency []  Word Retrieval []        Treatment Provided:  - Patient Education  - Oropharyngeal exercises  -thin liquid and regular consistency trials   -/s production in the initial position of words/phrases with placement/manner of production training  - diaphragmatic breathing techniques/in coordination with vowel -prolongation/words/phrases/sentences       Patient/Caregiver  Education: [x] Review HEP HEP/Handouts given:/s/ initial position of words, pitch inflection/prosody in phrases and sentences. Pain Level (0-10 scale) post treatment: 0    ASSESSMENT   []   Improving appropriately and progressing toward goals  [x]   Improving slowly and progressing toward goals  []   Approximating goals/maximum potential  [x]   Continues to benefit from skilled therapy to address remaining functional deficits  []   Not progressing toward goals and plan of care will be adjusted    Patient will continue to benefit from skilled therapy to address remaining functional deficits: Dysarthria, dysphagia    Progress towards goals / Updated goals:  1. The pt will increase quality and length of phonation by sustaining ah utilizing effective diaphragmatic breath support for >10 seconds in 3/3 sessions to increase functional speech intelligibility. 11/4/19: diaphragmatic breathing patterns  80% acc with min-mod cues; and in /coordinations with verbal tasks/vowel prolongation duration:     9 sec, 9sec, 11 sec, 9 sec, 9 sec; 12 sec; 10 sec; 10 sec. 10 sec; 11 sec. 2. Pt will demonstrate ability to produce improve prosody, rhyme, rhythm in structured sentences, word emphasis tasks, and varying pitch phrases using various comp strategies with 80% acc given min-mod cues in 3/3opportunities to improve naturalness of speech. 11/419: phrases and sentences with rising and falling pitch, prosody, word emphasis: with 85% acc imitatively, then 85% acc with S with written stimulus with min cues. 3. The patient will articulate  (s/z) consonants at the word level- phrases level  with 90% acc with use of comp strategies and OMES  to improve speech intelligibility to > 90% acc when provided with min cues. 11/4/19: /s/ initial position of words 73% acc with mod cues (mild lateralization of air noted); in 2-3 word phrases: 83% acc with min cues.  Patient wears dentures and has an overbite he stated and has not put his teeth together to produce  /s/ premorbidly. He performed better at the phrase and sentence level which was more automatic for him. 4. The patient will articulate  (l, voiced /th/ consonants at the word level- phrases level  with 90% acc with use of comp strategies and OMES  to improve speech intelligibility to > 90% acc when provided with min cues. 11/4/19: not addressed this date. 5. Patient will complete tongue base retraction,and laryngeal/pharyngeal strengthening exercises (including Sheyla maneuver, Mendelsohn maneuver, modified Shaker with CTAR ball, hard /k/ in isolation,  effortful swallow, etc.), with min cues in 5/5 sessions in order to improve the strength/agility/efficiency of his swallow for improved swallowing safety and QOL. 11/4/19: Modified sheyla x16- patient stated that he was previously taught at [facility] rehab tx to take some water in his mouth during the exercise. Patient was re-educated regarding risk of aspiration with liquid in mouth during exercise and had him swallow just a sip and tell where his tongue was supposed to be. He stated at the top behind his teeth. SLP explained that holding onto the tongue to swallow was not as protective and promotes aspiration. Patient stated that he understood but may still need reinforcement with this concept. Patient with reduced ability to complete Sheyla d/t xerostomia but successful if the stopped after each  3rd trial to take a sip of liquid with chin tuck and then resume the exercise with salivaonly. 6. Patient will recall/demonstrate aspiration precautions and safe swallowing strategies with 100% acc when provided with occasional cues in 5/5 sessions (small sips/bites; chin tuck with all liquid intake; alternate liquids/solids; slow rate; NO straws, Sit upright at 90 degree angle during PO trials)  to decrease the reported incidences of dysphagia and s/sx of aspiration. 11/4/19:  Recalled strategies x80% with min cues.  Demo strategies with thin liquid and regular solids with 90% acc with S. Occasional verbal cue to chin tuck with liquid. 7. Patient will tolerate trials of thin liquids (>4 ounces) using compensatory strategies without s/sx of aspiration/penetration when provided with Michael across 5/5 sessions. 11/4/19:  Patient tolerated ~8 oz of water with utilization of chin tuck x 95% and and 1 instance of throat clear post swallow after alternating regular solids and liquids. Patient stated the throat clear was performed to clear liquid versus the solid. .      8. Patient will tolerate regular solids with utilization of compensatory strategies without s/sx of aspiration/penetration or distress with Michael across 5/5 sessions. 11/4/19:  Patient tolerated peanut butter crackers utilizing compensatory swallowing strategies with only 1 verbal cue needed for chin tuck with liquid. No overt s/sx of aspiration with regular texture today.       PLAN  [x]  Continue plan of care  []  Modify Goals/Treatment Plan      []  Discharge due to:  [] Other:    SILVERIO Miller 11/4/2019  10:45 AM    Future Appointments   Date Time Provider Marc Ruvalcaba   11/4/2019 10:45 AM SILVERIO Sinclair DFORSMG [facility]   11/4/2019 11:30 AM Stacey Bennett, PT MMCPTPB [facility]   11/4/2019 12:00 PM Sue Jiménez MKRWEAV [facility]   11/6/2019  3:15 PM Sue Jiménez [id number] [facility]   11/7/2019  9:00 AM Maliha Sow MD [address]   11/8/2019  2:00 PM Sue Jiménez HOPXJJK [facility]   11/8/2019  3:00 PM Saskia Cyr PTA MMCPTPB [facility]   11/8/2019  3:30 PM SILVERIO Burgos ZOCAFHD [facility]   11/11/2019 12:15 PM SILVERIO Sinclair MQIXEJD [facility]   11/11/2019  1:00 PM Saskia Cyr, PTA MMCPTPB [facility]   11/11/2019  1:45 PM Sue Jiménez RSIWQLQ [facility]   11/13/2019 11:00 AM Santiago Piña, OTR/L [id number] [facility]   11/13/2019 12:15 PM Octavio Bravo, SLP [id number] [facility]   11/15/2019  1:30 PM Saskia Cyr, PTA MMCPTPB [facility]   11/15/2019  2:00 PM Santiago Piña, OTR/L OYSTTRI [facility]   11/15/2019  2:45 PM Orville Ríos, SILVERIO BWCPMFB [facility]   11/19/2019  3:00 PM John Connolly, PTA MMCPTPB [facility]   11/19/2019  3:30 PM Lilian Linder, OTR/L MMCPTPB [facility]   11/19/2019  4:45 PM Orville Ríos, SLP MMCPTPB [facility]   11/20/2019  9:45 AM Orville Ríos, SLP [id number] [facility]   11/20/2019 10:30 AM John Connolly, PTA MMCPTPB [facility]   11/20/2019 11:00 AM Albaro Hamiltno GCSSPFF [facility]   11/22/2019  3:00 PM John Connolly, PTA MMCPTPB [facility]   11/22/2019  3:30 PM Albaro Hamilton AGFVNLG [facility]   11/22/2019  4:15 PM Paris Waters, SLP MMCPTPB [facility]   11/25/2019 12:00 PM Jamshid Madrid, PT MMCPTPB [facility]   11/25/2019  1:00 PM Lilian Linder, OTR/L MMCPTPB [facility]   11/25/2019  1:45 PM Orville Ríos, SLP ILYIQLN [facility]   11/26/2019  1:45 PM Orville Ríos, SILVERIO NCZJFPN [facility]   11/26/2019  2:45 PM Albaro Hamilton WXYUIXQ [facility]   11/27/2019  3:00 PM John Connolly, PTA MMCPTPB [facility]   11/27/2019  4:00 PM Albaro Hamilton [id number] [facility]   12/13/2019  8:30 AM Dillan Perez MD [address]

## 2019-11-06 ENCOUNTER — APPOINTMENT (OUTPATIENT)
Dept: PHYSICAL THERAPY | Age: 64
End: 2019-11-06
Payer: MEDICARE

## 2019-11-06 ENCOUNTER — HOSPITAL ENCOUNTER (OUTPATIENT)
Dept: PHYSICAL THERAPY | Age: 64
Discharge: HOME | End: 2019-11-06
Payer: MEDICARE

## 2019-11-06 PROCEDURE — 97112 NEUROMUSCULAR REEDUCATION: CPT

## 2019-11-06 PROCEDURE — 97535 SELF CARE MNGMENT TRAINING: CPT

## 2019-11-06 NOTE — PROGRESS NOTES
OT DAILY TREATMENT NOTE 10-18    Patient Name: Paulette Funes  Date:2019  : 1955  [x]  Patient  Verified  Payor: [place] MEDICARE / Plan: VA MEDICARE PART A & B / Product Type: Medicare /    In time:310  Out time:355  Total Treatment Time (min): 45  Visit #: 6 of 24    Medicare/BCBS Only   Total Timed Codes (min):  45 1:1 Treatment Time:  45     Treatment Area: Upper extremity weakness [R29.898]  Cerebral infarction, unspecified [I63.9]    SUBJECTIVE  Pain Level (0-10 scale): 0/10  Any medication changes, allergies to medications, adverse drug reactions, diagnosis change, or new procedure performed?: [x] No    [] Yes (see summary sheet for update)  Subjective functional status/changes:   [] No changes reported  Still can't do my brace  How long do I have to wear the stocking?     OBJECTIVE       30 min Neuromuscular Re-education:  []  See flow sheet :   Rationale: increase ROM and increase strength  to improve the patients ability to move right arm volitionally  B UEs shoulder flex ext recip with matched resistance to increase recruitment of right UE muscles  B UE elbow flex ext with matched resistance to increase recruitment of right UE muscles  Supine place and hold right shoulder flexion for shoulder stabilization right shoulder  Sidelying right elbow flexion extension with minimal motion noted  Seated able to place right hand on table using elbow flexion x 1          15 min Self Care/Home Management: AFO   Rationale: adaptive techniques  to improve the patients ability to don and doff own AFO  Able to doff  Right AFO without assist  Trial of donning x 2 with assist to keep shoe in position     With   [] TE   [] TA   [] neuro   [] other: Patient Education: [x] Review HEP    [] Progressed/Changed HEP based on:   [] positioning   [] body mechanics   [] transfers   [] heat/ice application   [] Splint wear/care   [] Sensory re-education   [] scar management      [] other: adaptive technique for AFO  Ask MD re support hose, usually ok when walking well           Other Objective/Functional Measures:   Requires min assist to don AFO to stabilize while placing foot in AFO in shoe   Able to stabilize shoulder in flexion with place and hold for 5 seconds    Pain Level (0-10 scale) post treatment: 0/10    ASSESSMENT/Changes in Function: Improving recruitment of biceps    Patient will continue to benefit from skilled OT services to modify and progress therapeutic interventions, address ROM deficits, address strength deficits, analyze and address soft tissue restrictions and instruct in home and community integration to attain remaining goals. []  See Plan of Care  []  See progress note/recertification  []  See Discharge Summary         Progress towards goals / Updated goals:  *1.  Patient will be independent in home exercise program for AAROM of right UE to reduce level of assist for self care   Status at Eval/Last Progress Note:  Status at Last Visit:Progressing 11/4/19     2.  Patient will be able to don right shoe with AFO with supervision. Status at Eval/Last Progress Note:  Status at Last Visit:met 10/23/19, still with difficulty at home , required min assist 11/6/19     3.  Patient will be familiar with task modification to allow him to bathe left side without assist.  Status at Eval/Last Progress Note:  Status at Last Visit:  [facility] 57 [facility] be able to place right arm on table using active motion for use of right hand as assist  Status at Eval/Last Progress Note:  Status at Last Visit:.still with poor active elbow flexion 11/1/19, active elbow flexion to place hand on table x 1 today 11/6/19     2.  Patient will be Modified independent for all self care tasks. Status at Eval/Last Progress Note:  Status at Last Visit:progressing 11/1/19     3.  Patient will be able to use right hand as stabilizing assist for self care activities.   Status at Eval/Last Progress Note:  Status at Last Visit:     4.  Patient will report improved ability to use right hand to assist with home care as shown by reduced limitation of at least 20% on Neuro quality of Life.   Status at Eval/Last Progress Note: 82% limitation   Status at Last Visit:    PLAN  []  Upgrade activities as tolerated     [x]  Continue plan of care  []  Update interventions per flow sheet       []  Discharge due to:_  []  Other:_      Errol Smith OTR/L 11/6/2019  2:06 PM    Future Appointments   Date Time Provider Marc Ruvalcaba   11/6/2019  3:15 PM Ana Castellon OTR/L MMCPTPB [address]   11/7/2019  9:00 AM Mahi Sherwood MD [address]   11/8/2019  2:00 PM Jeremiah Simpson XSTHTOX [address]   11/8/2019  3:00 PM Alejandro Cardoza, PTA MMCPTPB [address]   11/8/2019  3:30 PM [facility] MMCPTPB [address]   11/11/2019 12:15 PM Roni Balderas, [facility] MMCPTPB [address]   11/11/2019  1:00 PM Alejandro Cardoza, PTA MMCPTPB [address]   11/11/2019  1:45 PM Jeremiah Simpson MSIBXDK [address]   11/13/2019 11:00 AM Ana Castellon, OTR/L MMCPTPB [address]   11/13/2019 12:15 PM Gifty Marx I, [facility] MMCPTPB [address]   11/15/2019  1:30 PM Alejandro Cardoza, PTA MMCPTPB [address]   11/15/2019  2:00 PM Ana Castellon OTR/L MMCPTPB [address]   11/15/2019  2:45 PM Melba Villanueva, [facility] MMCPTPB [address]   11/19/2019  3:00 PM Alejandro Cardoza, PTA MMCPTPB [address]   11/19/2019  3:30 PM Ana Castellon OTR/L MMCPTPB [address]   11/19/2019  4:45 PM eMlba Villanueva, SLP MMCPTPB [address]   11/20/2019  9:45 AM Melba Villanueva, SILVERIO MMCPTPB [address]   11/20/2019 10:30 AM Alejandro Cardoza, LINDA MMCPTPB [address]   11/20/2019 11:00 AM Jeremiah Simpson VNZNZTA [address]   11/22/2019  3:00 PM Alejandro Cardoza PTA MMCPTPB [address]   11/22/2019  3:30 PM Jeremiah Simpson EYBITTM [address]   11/22/2019  4:15 PM Joe Haley SLP MMCPTPB [address]   11/25/2019 12:00 PM Carito Hinojosa, PT MMCPTPB [address]   11/25/2019  1:00 PM Ana Castellon, OTR/L MMCPTPB [address]   11/25/2019  1:45 PM Melba Villanueva, SILVERIO MMCPTPB [address] 11/26/2019  1:45 PM Bharath Nieto, SILVERIO EEYGSSK [facility]   11/26/2019  2:45 PM Jasmin Crew IICBQHR [facility]   11/27/2019  3:00 PM Linda Joel PTA MMCPTPB [facility]   11/27/2019  4:00 PM Jasmin Crew EZLAVLQ [facility]   12/13/2019  8:30 AM Stephani Perez MD [address]

## 2019-11-07 ENCOUNTER — OFFICE VISIT (OUTPATIENT)
Dept: NEUROLOGY | Age: 64
End: 2019-11-07

## 2019-11-07 VITALS
TEMPERATURE: 98.4 F | HEIGHT: 69 IN | DIASTOLIC BLOOD PRESSURE: 60 MMHG | BODY MASS INDEX: 31.1 KG/M2 | RESPIRATION RATE: 18 BRPM | WEIGHT: 210 LBS | SYSTOLIC BLOOD PRESSURE: 130 MMHG | HEART RATE: 55 BPM | OXYGEN SATURATION: 98 %

## 2019-11-07 DIAGNOSIS — I63.512 ACUTE ISCHEMIC LEFT MCA STROKE (HCC): ICD-10-CM

## 2019-11-07 DIAGNOSIS — G47.33 OSA (OBSTRUCTIVE SLEEP APNEA): Primary | ICD-10-CM

## 2019-11-07 DIAGNOSIS — I10 ESSENTIAL HYPERTENSION: ICD-10-CM

## 2019-11-07 NOTE — PROGRESS NOTES
11/7/2019 9:25 AM    SSN: [id number]    Subjective:   72 y/o male coming for f/u of STEFANIE. His last visit with me was in June. His download looked great at the time, through 6/5 showed AHI of 4.0, on auto BIPAP max IPAP at 25 and min IPAP at 5, median 9.2, aver use of 8:51, 30/30 days use. He continues to use it on a daily basis. Through November 4 he had 30 out of 30 days of use with average use of 8 hours and 47 minutes, on ASV with a mean and max pressures at 4 and 25 respectively and pressure support of 4. There is little leak and an AHI of 4.0. Interestingly in order for him to get supplies Medicare is asking for a new diagnostic sleep study because the other one is more than 8years old. The last time here his blood pressure was extremely elevated, initially 220/90. He refused to go to the emergency room. He did go to see his primary for adjustments of blood pressure medications and these were adjusted. However unfortunately on August 12 he presented to the hospital with acute onset of dysarthria and right-sided hemiparesis. His speech has improved, he still remains quite weak on the right upper extremity unable to move, he has regained of some movement of the right lower extremity and he is walking with a 4 prong cane. He is going to physical therapy. His blood pressure is better controlled. He was found to have a left posterior basal ganglia to coronary radiata infarct on MRI of the brain which ever have personally reviewed the previous CT angiogram did not show any large vessel occlusions and no carotid disease of significance. He is now on atorvastatin at 80 mg daily. He is on one baby aspirin, initially he was at 325 mg daily, he never was on dual antiplatelet therapy. He is not a smoker.     Social History     Socioeconomic History    Marital status: UNKNOWN     Spouse name: Not on file    Number of children: Not on file    Years of education: Not on file    Highest education level: Not on file   Occupational History    Not on file   Social Needs    Financial resource strain: Not on file    Food insecurity:     Worry: Not on file     Inability: Not on file    Transportation needs:     Medical: Not on file     Non-medical: Not on file   Tobacco Use    Smoking status: Former Smoker    Smokeless tobacco: Never Used   Substance and Sexual Activity    Alcohol use: No     Alcohol/week: 0.0 standard drinks    Drug use: No    Sexual activity: Not on file   Lifestyle    Physical activity:     Days per week: Not on file     Minutes per session: Not on file    Stress: Not on file   Relationships    Social connections:     Talks on phone: Not on file     Gets together: Not on file     Attends Bahai service: Not on file     Active member of club or organization: Not on file     Attends meetings of clubs or organizations: Not on file     Relationship status: Not on file    Intimate partner violence:     Fear of current or ex partner: Not on file     Emotionally abused: Not on file     Physically abused: Not on file     Forced sexual activity: Not on file   Other Topics Concern    Not on file   Social History Narrative    Not on file       Family History   Problem Relation Age of Onset    Cancer Mother         Gastric cancer    Heart Disease Father     Prostate Cancer Father        Current Outpatient Medications   Medication Sig Dispense Refill    ascorbic acid, vitamin C, (VITAMIN C) 250 mg tablet Take 1 Tab by mouth daily (with breakfast). 30 Tab 0    ferrous sulfate 325 mg (65 mg iron) tablet Take 1 Tab by mouth daily (with breakfast). 30 Tab 0    hydrALAZINE (APRESOLINE) 25 mg tablet Take 3 Tabs by mouth every eight (8) hours. Indications: high blood pressure 270 Tab 0    aspirin 81 mg chewable tablet Take 1 Tab by mouth daily (with breakfast). Indications: stroke prevention 30 Tab 0    atorvastatin (LIPITOR) 80 mg tablet Take 1 Tab by mouth nightly. Indications: excessive fat in the blood, stroke prevention 30 Tab 0    cholecalciferol (VITAMIN D3) 5,000 unit capsule Take 1 Cap by mouth daily. Indications: vitamin D deficiency 30 Cap 0    cyanocobalamin 1,000 mcg tablet Take 1 Tab by mouth daily. Indications: Vitamin B12 deficiency 30 Tab 0    isosorbide dinitrate (ISORDIL) 30 mg tablet Take 1 Tab by mouth every eight (8) hours. Indications: Hypertension 90 Tab 0    labetalol (NORMODYNE) 300 mg tablet Take 2 Tabs by mouth every twelve (12) hours. Indications: high blood pressure 120 Tab 0    spironolactone (ALDACTONE) 50 mg tablet Take 1 Tab by mouth two (2) times a day. Indications: aldosteronism, high blood pressure, prevention of low potassium in the blood 60 Tab 0    amLODIPine (NORVASC) 10 mg tablet Take 10 mg by mouth daily.          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     On Allopurinol    Chronic hypokalemia     Persistent chronic hypokalemia + hypertension; ?primary hyperaldosteronism    CKD (chronic kidney disease) stage 2, GFR 60-89 ml/min 8/15/2019    Current use of aspirin 8/14/2019    Dysarthria 8/12/2019    Dysphagia 8/12/2019    Elevated prostate specific antigen (PSA) 7/21/2011    First degree atrioventricular block by electrocardiogram 8/12/2019    Hypertensive heart and kidney disease without heart failure and with stage 2 chronic kidney disease     Iron deficiency anemia 8/14/2019    Anemia work-up (8/14/2019) showed serum iron 22, TIBC 371, iron % saturation 6, ferritin 34, reticulocyte count 1.4     Obesity, Class I, BMI 30-34.9     Obstructive sleep apnea on CPAP     On statin therapy due to risk of future cardiovascular event 8/14/2019    On Atorvastatin    Primary osteoarthritis of right ankle 8/22/2019    X-ray of the right ankle (8/22/2019) showed no acute fracture or subluxation; advanced degenerative changes at the tibiotalar joint; old fracture fragment vs. accessory ossicle in the inferior aspect of the lateral malleolus; soft tissue swelling around the ankle.  Pure hypercholesterolemia 08/15/2019    Lipid profile (8/13/2019) showed , , HDL 42, ; Lipid profile (8/14/2019) showed , , HDL 39, LDL 94    Received intravenous tissue plasminogen activator (tPA) in emergency department 8/12/2019    Refusal of statin medication by patient 8/13/2019    As per note of Neurology (Dr. Varun Manriquez), patient refuses statin agent because of potential side effects.  Right hemiparesis ([facility] [address] 8/12/2019    Secondary hyperparathyroidism of renal origin ([facility] [address] 8/18/2019    PTH (8/18/2019) = 101.7    Type 2 diabetes mellitus with stage 2 chronic kidney disease (HCC)     HbA1c (8/13/2019) = 6.6    Vitamin B12 deficiency anemia 8/14/2019    Vitamin B12 (8/14/2019) = 208    Vitamin D deficiency 8/19/2019    Vitamin D 25-Hydroxy (8/19/2019) = 16.2        Past Surgical History:   Procedure Laterality Date    COLONOSCOPY N/A 4/15/2019    COLONOSCOPY performed by Christine Taylor MD at [facility] ENDOSCOPY    HX TONSILLECTOMY         No Known Allergies    Vital signs:    Visit Vitals  /60 (BP 1 Location: Left arm, BP Patient Position: Sitting)   Pulse (!) 55   Temp 98.4 °F (36.9 °C)   Resp 18   Ht 5' 9\" (1.753 m)   Wt 95.3 kg (210 lb)   SpO2 98%   BMI 31.01 kg/m²       Review of Systems:   GENERAL: Denies fever or fatigue  CARDIAC: No CP or SOB  PULMONARY: No cough of SOB  MUSCULOSKELETAL: No new joint pain  NEURO: SEE HPI      EXAM: Alert, in NAD. Heart is regular.  Oriented x3, EOM's are full, PERRL, right lower facial droop with slight dysarthria flaccid right upper extremity paresthesias, 3/5 right lower extremity, right-sided hemiparetic gait, DTRs +3 on the right. Assessment/Plan: His STEFANIE is well treated, he is continually compliant. He is always to me that he has sleep apnea, given his symptomatology and his AHI of 4.0, but because of insurance demands I will go ahead and get a home sleep study to try to re-establish this diagnosis for insurance more than legitimate medical reasons so he can get his supplies. 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 The mechanism is not likely to be cardioembolic. EKG showed he was on normal sinus rhythm. He has been appropriately evaluated and although he was supposed to be on dual antiplatelet therapy for 3 months, now we are at the three-point yvette and this did not happen. I advised him to take 2 baby aspirin as per day for a total dose of 162 mg daily from now on. I discussed reasonable expectations for improvement. I encouraged continued work with physical therapy, but also try to set some realistic expectation that there may be some permanent deficit may have to get used to. I counseled him about the importance of continuing to monitor his blood pressure. 25/40 mins in counseling of stroke prevention, sleep apnea, treatment, the need for adjustments of medications and repeat of a sleep study. PLEASE NOTE:   Portions of this document may have been produced using voice recognition software. Unrecognized errors in transcription may be present. This note will not be viewable in [address]

## 2019-11-07 NOTE — PROGRESS NOTES
Chief Complaint   Patient presents with    Stroke     follow up    Sleep Problem     follow up sleep study. Charlene Vaughan presents today for   Chief Complaint   Patient presents with    Stroke     follow up    Sleep Problem     follow up sleep study. Is someone accompanying this pt? Yes, wife    Is the patient using any DME equipment during [address]? [facility]    Depression Screening:  3 most recent PHQ Screens 11/7/2019   Little interest or pleasure in doing things Not at all   Feeling down, depressed, irritable, or hopeless Not at all   Total Score PHQ 2 0       Learning Assessment:  No flowsheet data found. Abuse Screening:  No flowsheet data found. Fall Risk  Fall Risk Assessment, last 12 mths 11/7/2019   Able to walk? Yes   Fall in past 12 months? No         Coordination of Care:  1. Have you been to the ER, urgent care clinic since your last visit? Hospitalized since your last visit? no    2. Have you seen or consulted any other health care providers outside of the [address] [place] [address] since your last visit? Include any pap smears or colon screening.  Yes, Rochelle

## 2019-11-07 NOTE — PATIENT INSTRUCTIONS
Thank you for choosing [employer] and [facility] for your  
 
care. You may receive a survey about your visit. We appreciate you taking time  
 
to complete this survey as we use your feedback to improve our services. We  
 
realize we are not perfect, but we strive to provide excellent care.

## 2019-11-08 ENCOUNTER — HOSPITAL ENCOUNTER (OUTPATIENT)
Dept: PHYSICAL THERAPY | Age: 64
Discharge: HOME | End: 2019-11-08
Payer: MEDICARE

## 2019-11-08 PROCEDURE — 92507 TX SP LANG VOICE COMM INDIV: CPT

## 2019-11-08 PROCEDURE — 97112 NEUROMUSCULAR REEDUCATION: CPT

## 2019-11-08 PROCEDURE — 97535 SELF CARE MNGMENT TRAINING: CPT

## 2019-11-08 PROCEDURE — 97110 THERAPEUTIC EXERCISES: CPT

## 2019-11-08 PROCEDURE — 92526 ORAL FUNCTION THERAPY: CPT

## 2019-11-08 NOTE — PROGRESS NOTES
OT DAILY TREATMENT NOTE 10-18    Patient Name: Sondra Campbell  Date:2019  : 1955  [x]  Patient  Verified  Payor: [place] MEDICARE / Plan: [place] MEDICARE PART A & B / Product Type: Medicare /    In time:200  Out time:245  Total Treatment Time (min): 45  Visit #: 7 of 24    Medicare/BCBS Only   Total Timed Codes (min):  45 1:1 Treatment Time:  45     Treatment Area: Upper extremity weakness [R29.898]  Cerebral infarction, unspecified [I63.9]    SUBJECTIVE  Pain Level (0-10 scale): 0/10  Any medication changes, allergies to medications, adverse drug reactions, diagnosis change, or new procedure performed?: [x] No    [] Yes (see summary sheet for update)  Subjective functional status/changes:   [] No changes reported  Oh wow I never thought about trying it that way     OBJECTIVE    25 min Neuromuscular Re-education:  []  See flow sheet :   Rationale: increase ROM and increase strength  to improve the patients ability to volitionally move right UE    Shrugs/retraction  Self Wrist ROM  Self Digit ROM  Matched resistance Shoulder flexion  Table slides  Table slides with resistance     20 min Self Care/Home Management: UB Bathing, Shoe Donning   Rationale: Adaptive Strategies  to improve the patients ability to perform ADL/IADL's with increased independence     Education on use of long handled sponge with patient demonstration   Education on adaptive strategies to washing non-affected arm   Education on use of long handled shoe horn to assist with donning sneaker with LFO  Patient demonstration of use of long handled shoe horn   Education on secondary strategy of turning hips while seated at edge of bed to bring foot closer to body to increase ease of donning sneaker             With   [] TE   [] TA   [] neuro   [] other: Patient Education: [x] Review HEP    [] Progressed/Changed HEP based on:   [] positioning   [] body mechanics   [] transfers   [] heat/ice application   [] Splint wear/care   [] Sensory re-education   [] scar management      [] other:            Other Objective/Functional Measures:     Patient able to demonstrate new strategies for shoe donning/UB Bathing        Pain Level (0-10 scale) post treatment: 0/10    ASSESSMENT/Changes in Function: shoulder strength improving     Patient will continue to benefit from skilled OT services to modify and progress therapeutic interventions, address ROM deficits, address strength deficits, analyze and cue movement patterns and instruct in home and community integration to attain remaining goals. []  See Plan of Care  []  See progress note/recertification  []  See Discharge Summary         Progress towards goals / Updated goals:  1.  Patient will be independent in home exercise program for AAROM of right UE to reduce level of assist for self care   Status at Eval/Last Progress Note: not addressed at eval  Status at Last Visit: Met 11/8/19     2.  Patient will be able to don right shoe with AFO with supervision. Status at Eval/Last Progress Note:  Status at Last Visit:met 10/23/19, still with difficulty at home , required min assist 11/6/19     3.  Patient will be familiar with task modification to allow him to bathe left side without assist.  Status at Eval/Last Progress Note: not addressed at eval  Status at Last Visit: Education on adaptive bathing techniques/strategies, check for carryover 11/8/19        Long Term Goals: To be accomplished in [address]  1.  Patient will be able to place right arm on table using active motion for use of right hand as assist  Status at Eval/Last Progress Note:  Status at Last Visit:.still with poor active elbow flexion 11/1/19, active elbow flexion to place hand on table x 1 today 11/6/19     2.  Patient will be Modified independent for all self care tasks.   Status at Eval/Last Progress Note: Not adressed  Status at Last Visit:progressing, adaptive techniques for donning right sneaker given with demo 11/8/19     3.  Patient will be able to use right hand as stabilizing assist for self care activities.   Status at Eval/Last Progress Note: unable   Status at Last Visit: Able to hold wash cloth intermittently With Right hand 11/8/19     4.  Patient will report improved ability to use right hand to assist with home care as shown by reduced limitation of at least 20% on Neuro quality of Life.   Status at Eval/Last Progress Note: 82% limitation   Status at Last Visit:       PLAN  []  Upgrade activities as tolerated     [x]  Continue plan of care  []  Update interventions per flow sheet       []  Discharge due to:_  []  Other:_      Isabelle RamirezANDERSON/L 11/8/2019  8:03 AM    Future Appointments   Date Time Provider Marc Ruvalcaba   11/8/2019  2:00 PM Solitario Pierce FERTPUR [facility]   11/8/2019  3:00 PM Pricila Gottlieb PTA MMCPTPB [facility]   11/8/2019  3:30 PM SILVERIO Garza MMCPTPB [facility]   11/11/2019 12:15 PM Elenita Dacosta SLP MMCPTPB [facility]   11/11/2019  1:00 PM Pricila Gottlieb PTA MMCPTPB [facility]   11/11/2019  1:45 PM Solitario COVARRUBIASYMJAS [facility]   11/13/2019 11:00 AM Claudetta Hickory, OTR/L MMCPTPB [facility]   11/13/2019 12:15 PM SILVERIO Garza MMCPTPB [facility]   11/15/2019  1:30 PM Pricila Gottlieb PTA MMCPTPB [facility]   11/15/2019  2:00 PM Claudetta Hickory, OTR/L MMCPTPB [facility]   11/15/2019  2:45 PM SILVERIO Garza MMCPTPB [facility]   11/19/2019  3:00 PM Pricila Gottlieb PTA MMCPTPB [facility]   11/19/2019  3:30 PM Claudetta Hickory, OTR/L MMCPTPB [facility]   11/19/2019  4:45 PM Cortez Louis, SLP MMCPTPB [facility]   11/20/2019  9:45 AM Cortez Louis, SLP MMCPTPB [facility]   11/20/2019 10:30 AM Pricila Gottlieb, PTA MMCPTPB [facility]   11/20/2019 11:00 AM Solitario Lakes [id number] [facility]   11/22/2019  3:00 PM Pricila Gottlieb, PTA MMCPTPB [facility]   11/22/2019  3:30 PM Solitario Lakes UTDYQCI [facility]   11/22/2019  4:15 PM Alec Sacks, SLP MMCPTPB [facility]   11/25/2019 12:00 PM Elena Baltazar, PT MMCPTPB [facility]   11/25/2019  1:00 PM Sindy Monika Garland, OTR/L MMCPTPB [facility]   11/25/2019  1:45 PM Scout Diego, SLP MMCPTPB [facility]   11/26/2019  1:45 PM Scout Diego, SLP [id number] [facility]   11/26/2019  2:45 PM Lainey Cardenas CXTSANQ [facility]   11/27/2019  3:00 PM Lorieginny Levins, PTA MMCPTPB [facility]   11/27/2019  4:00 PM Lainey Cardenas [id number] [facility]   12/2/2019  1:00 PM Lorie Gamez, PTA MMCPTPB [facility]   12/2/2019  1:45 PM SILVERIO Mccann IRXWMTG [facility]   12/2/2019  2:30 PM Ivy Limon, OTR/L MMCPTPB [facility]   12/4/2019 10:30 AM Lorie Gamez, PTA MMCPTPB [facility]   12/4/2019 11:00 AM Lainey Cardenas [id number] [facility]   12/4/2019 12:00 PM SILVERIO Mccann PDCIXYV [facility]   12/6/2019  1:15 PM Lainey Cardenas KPGSGDD [facility]   12/6/2019  2:00 PM Scout Diego SLP MMCPTPB [facility]   12/9/2019  9:45 AM SILVERIO Mccann UURRXKO [facility]   12/9/2019 10:30 AM Danielle Alves, PT MMCPTPB [facility]   12/9/2019 11:00 AM Lainey Cardenas JCDTMLR [facility]   12/11/2019  9:45 AM SILVERIO Mccann MMCPTPB [facility]   12/11/2019 10:30 AM Danielle Alves, PT MMCPTPB [facility]   12/11/2019 11:00 AM Ivy Limon, OTR/L MMCPTPB [facility]   12/13/2019  8:30 AM Cruz Meckel, MD [address]   12/13/2019  9:45 AM SILVERIO Mccann ISWLODE [facility]   12/13/2019 10:30 AM Ivy Limon, OTR/L MMCPTPB [facility]   12/16/2019 12:00 PM Danielle Alves, PT MMCPTPB [facility]   12/16/2019  1:00 PM Laniey Cardenas GBRCKLL [facility]   12/16/2019  1:45 PM SILVERIO Mccann SXCYBXU [facility]   12/18/2019  2:00 PM Lorie Gamez PTA MMCPTPB [facility]   12/18/2019  2:45 PM SILVERIO Mccann QYUPKKG [facility]   12/18/2019  3:30 PM Lainey Cardenas PZMWGMX [facility]   12/20/2019  1:15 PM Lainey Cardenas SHIYOMC [facility]   12/20/2019  2:00 PM SILVERIO Mccann FNMALRM [facility]   12/23/2019  9:00 AM Lorie Gamez PTA MMCPTPB [facility]   12/23/2019  9:30 AM Lainey Cardenas WVIEZHH [facility]   12/23/2019 10:30 AM SILVERIO Kovacs CIZCADX [facility]   12/24/2019  9:00 AM Lorie Gamez PTA MMCPTPB [facility]   12/24/2019  9:30 AM Mk Rodriguez ANEYXFT [facility]   12/24/2019 10:15 AM Agueda Vera, SLP SHUVUCT [facility]   12/27/2019  9:45 AM Agueda Vera, SLP LATDVEP [facility]   12/27/2019 10:30 AM Eleno Baltazar, OTR/L [id number] [facility]   12/30/2019  9:45 AM SILVERIO Stout SWITBGB [facility]   12/30/2019 10:30 AM Clemente Pronto, PTA MMCPTPB [facility]   12/30/2019 11:00 AM Eleno Baltazar, OTR/L MMCPTPB [facility]   12/31/2019  9:45 AM Eleno Baltazar, OTR/L MMCPTPB [facility]   12/31/2019 10:30 AM Clemente Machadoto, PTA MMCPTPB [facility]   12/31/2019 11:15 AM SILVERIO Stout ERYIJQL [facility]   1/2/2020  9:45 AM Nancy Hayden LDSCHWW [facility]   1/2/2020 10:30 AM SILVERIO Weaver OMRLVZM [facility]

## 2019-11-08 NOTE — PROGRESS NOTES
PT DAILY TREATMENT NOTE 10-18    Patient Name: Mireya Gomez  Date:2019  : 1955  [x]  Patient  Verified  Payor: [place] MEDICARE / Plan: [place] MEDICARE PART A & B / Product Type: Medicare /    In time:300  Out time:329  Total Treatment Time (min): 29  Visit #: 6 of 24    Medicare/BCBS Only   Total Timed Codes (min):  29 1:1 Treatment Time:  29       Treatment Area: Lower extremity weakness [R29.898]  Cerebral infarction, unspecified [I63.9]    SUBJECTIVE  Pain Level (0-10 scale): 0/10  Any medication changes, allergies to medications, adverse drug reactions, diagnosis change, or new procedure performed?: [x] No    [] Yes (see summary sheet for update)  Subjective functional status/changes:   [] No changes reported  Pt stated that he is doing good today    OBJECTIVE    29 min Therapeutic Exercise:  [x] See flow sheet :   Rationale: increase ROM, increase strength and improve balance to improve the patients ability to increase ease with ADls    With   [x] TE   [] TA   [] neuro   [] other: Patient Education: [x] Review HEP    [] Progressed/Changed HEP based on:   [] positioning   [] body mechanics   [] transfers   [] heat/ice application    [] other:      Other Objective/Functional Measures:   Had difficulty with step ups 2* poor right foot clearance   No LOB with static balance  Was fatigued with SLR on the right after 8 reps    Pain Level (0-10 scale) post treatment: 0/10    ASSESSMENT/Changes in Function:   Pt is slowly progressing toward goals. Strength in right LE is slowly improving. [place] cont [place] [facility] [place] difficulty due to right foot drop. Sit to stands are progressing slowly.  Pt cont with decreased ambulation tolerance    Patient will continue to benefit from skilled PT services to modify and progress therapeutic interventions, address functional mobility deficits, address ROM deficits, address strength deficits, analyze and cue movement patterns, analyze and modify body mechanics/ergonomics, assess and modify postural abnormalities, address imbalance/dizziness and instruct in home and community integration to attain remaining goals. [x]  See Plan of Care  []  See progress note/recertification  []  See Discharge Summary         Progress towards goals / Updated goals:  Short Term Goals: To be accomplished in 1 weeks:               1. Pt will be compliant with a HEP to improve function.   Goal met. 10/23/19     Long Term Goals: To be accomplished in 8 weeks:               1. Pt will ambulate 4 steps x 3 with 1 HR with supervision to be able to navigate stairs at home.                2. Pt will increase FOTO score by 7 pts to improve LE function.                3. Pt will increase right LE hip, quad and HS strength to 4/5 or greater to be able to navigate community distances. Slowly progressing. 11/8/19               4. Pt will demonstrate Romberg EO/EC on a compliant surface to decrease fall risk during ADL's.                3/. Pt will perform sit to stand x 10 to ease with transfers safely. Progressing. 10/30/19  MET.  11/4/19               6. Pt will ambulate with LRAD >150 ft to be able to navigate short community distances.     PLAN  []  Upgrade activities as tolerated     [x]  Continue plan of care  []  Update interventions per flow sheet       []  Discharge due to:_  []  Other:_      Kolby Heredia PTA 11/8/2019  2:52 PM    Future Appointments   Date Time Provider Marc Ruvalcaba   11/8/2019  3:00 PM Polo Coburn PTA [facility]   11/8/2019  3:30 PM Clinton Raymond, SILVERIO [facility]CPTPB [facility]   11/11/2019 12:15 PM SILVERIO Lord [facility]CPTP[facility]   11/11/2019  1:00 PM Polo Coburn PTA [facility]   11/11/2019  1:45 PM Sriram RODRIGUEZGYJOYCE [facility]   11/13/2019 11:00 AM MALLIKA Adorno/ROVERTO [facility]CPTPB [facility]   11/13/2019 12:15 PM Clinton Raymond SLP [facility]   11/15/2019  1:30 PM Polo Coburn PTA [facility]   11/15/2019  2:00 PM Shelly Shanks OTR/L OECENFP [facility]   11/15/2019  2:45 PM Bharath Nieto, SILVERIO AMTRBLY [facility]   11/19/2019  3:00 PM Linda Joel, PTA [facility]   11/19/2019  3:30 PM Sen Contreras, OTR/L [facility]   11/19/2019  4:45 PM Bharath Nieto, SLP [id number] [facility]   11/20/2019  9:45 AM Bharath Nieto, SILVERIO ACJUOSI [facility]   11/20/2019 10:30 AM Linda Joel, PTA [facility]CP[facility]   11/20/2019 11:00 AM Jasmin Crew CHNFCUG [facility]   11/22/2019  3:00 PM Linda Joel, PTA [facility]   11/22/2019  3:30 PM Jasmin Crew PFCXBGZ [facility]   11/22/2019  4:15 PM Jen Da Silva, SLP [facility]   11/25/2019 12:00 PM Peg Burgos, PT [facility]   11/25/2019  1:00 PM Sen Contreras, OTR/L [facility]   11/25/2019  1:45 PM SILVERIO Esqueda AJGJEVI [facility]   11/26/2019  1:45 PM SILVERIO Esqueda QGXXUOZ [facility]   11/26/2019  2:45 PM Jasmin Crew JABQAFL [facility]   11/27/2019  3:00 PM Linda Joel, PTA [facility]   11/27/2019  4:00 PM Jasmin Crew UTFVGYM [facility]   12/2/2019  1:00 PM Jeffersonriya Joel, PTA [facility]   12/2/2019  1:45 PM SILVERIO Esqueda RSQPGHW [facility]   12/2/2019  2:30 PM Sen Contreras, OTR/L [facility]CPTPB [facility]   12/4/2019 10:30 AM Linda Joel, PTA [facility]   12/4/2019 11:00 AM Jasmin Crew RLCJPSE [facility]   12/4/2019 12:00 PM SILVERIO Esqueda XFXSHEW [facility]   12/6/2019  1:15 PM Jasmin Crew FMAOVGS [facility]   12/6/2019  2:00 PM SILVERIO Esqueda GYLRMAJ [facility]   12/9/2019  9:45 AM SILVERIO Esqueda MLYZBUO [facility]   12/9/2019 10:30 AM Peg Burgos PT [facility]   12/9/2019 11:00 AM Jasmin Crew SHWPEAT [facility]   12/11/2019  9:45 AM SILVERIO Esqueda UUDMLMN [facility]   12/11/2019 10:30 AM Peg Burgos, SUKHWINDER [facility]CPTPB [facility]   12/11/2019 11:00 AM Sen Contreras, OTR/L [facility]CPTPB [facility]   12/13/2019  8:30 AM Christina Welch MD [address]   12/13/2019  9:45 AM SILVERIO Esqueda JLDPSDE [facility]   12/13/2019 10:30 AM Sen Contreras OTR/L [facility]CPTPB [facility]   12/16/2019 12:00 PM Peg Burgos, SUKHWINDER [facility]CPTPB [facility] 12/16/2019  1:00 PM Kathleen Esquivel IUOPHCO [facility]   12/16/2019  1:45 PM Tigre Waterman, SILVERIO RQXAUJY [facility]   12/18/2019  2:00 PM Dee Socks, PTA [facility]   12/18/2019  2:45 PM Tigre Waterman, SILVERIO UZTHHAH [facility]   12/18/2019  3:30 PM Kathleen Esquivel IVPNHIW [facility]   12/20/2019  1:15 PM Kathleen Esquivel MPXNCOS [facility]   12/20/2019  2:00 PM Tigre Waterman, SILVERIO ZDYJOKA [facility]   12/23/2019  9:00 AM Dee Socks, PTA [facility]CPTP[facility]   12/23/2019  9:30 AM Kathleen Esquivel IVMKVDM [facility]   12/23/2019 10:30 AM SILVERIO Sandoval VTVRWDN [facility]   12/24/2019  9:00 AM Dee Kirsties, PTA [facility]   12/24/2019  9:30 AM Kathleen Esquivel VZMNQWI [facility]   12/24/2019 10:15 AM Celine Carlin, SILVERIO MMCPTPB [facility]   12/27/2019  9:45 AM Celine Carlin, SLP [facility]CPTPB [facility]   12/27/2019 10:30 AM Arlette Eric OTR/ROVERTO [facility]   12/30/2019  9:45 AM Ishan Beyer I SLP HTQIQNQ [facility]   12/30/2019 10:30 AM Dee Socks, PTA [facility]   12/30/2019 11:00 AM Arlette Eric, OTR/L [facility]CPTPB [facility]   12/31/2019  9:45 AM Arlette Eric, OTR/L MMCPTPB [facility]   12/31/2019 10:30 AM Dee Marie, PTA [facility]   12/31/2019 11:15 AM Ishan Beyer I SLP UBIHDKQ [facility]   1/2/2020  9:45 AM Kathleen VARGAS [facility]   1/2/2020 10:30 AM SILVERIO Rossi [id number] [facility]

## 2019-11-08 NOTE — PROGRESS NOTES
ST DAILY TREATMENT NOTE    Patient Name: Antonia Grady  Date:2019  : 1955  [x]  Patient  Verified  Payor: Payor: [place] MEDICARE / Plan: VA MEDICARE PART A & B / Product Type: Medicare /   In time: 3:33  Out time: 4:15  Total Treatment Time (min): 42  Visit #: 6 of 24    Treatment Diagnosis: Dysarthria and anarthria [R47.1]    SUBJECTIVE  Pain Level (0-10 scale): 0  Any medication changes, allergies to medications, adverse drug reactions, diagnosis change, or new procedure performed?: [x] No    [] Yes (see summary sheet for update)    Subjective functional status/changes:   [x] No changes reported  \" I learned I can't eat corn flakes for breakfast\" \"I think I've got the /th/ words down\"     OBJECTIVE   Treatment provided includes:  Increase/Improve:  []  Voice Quality []  Cognitive Linguistic Skills [x]  Laryngeal/Pharyngeal Exercises   []  Vocal Loudness []  Reading Comprehension [x]  Swallowing Skills    []  Vocal Cord Function []  Auditory Comprehension []  Oral Motor Skills   []  Resonance []  Writing Skills [x]  Compensatory strategies    [x]  Speech Intelligibility []  Expressive Language []  Attention   [x]  Breath Support/Coord.  []  Receptive language []  Memory   [x]  Articulation []  Safety Awareness [x] pt education   []  Fluency []  Word Retrieval []        Treatment Provided:  Dysarthria Treatment:   - diaphragmatic breathing exercises with and without phonation with skilled training   - voiced /th/ in phrases and /s/ in multi-syllabic words to increase speech intelligibility   Dysphagia Treatment:   - po trials with skilled cueing for compensatory techniques to reduce risk of aspiration   - oropharyngeal strengthening exercises for increased strength and efficiency of swallow   - pt education for mixed consistencies and risk of aspiration     Patient/Caregiver  Education: [x] Review HEP      HEP/Handouts given: rickey as trained in session x15 twice daily ; avoid mixed consistencies Pain Level (0-10 scale) post treatment: 0    ASSESSMENT   Progressing in diaphragmatic breathing and articulatory precision for voiced /th/ and /s/  []   Improving appropriately and progressing toward goals  [x]   Improving slowly and progressing toward goals  []   Approximating goals/maximum potential  [x]   Continues to benefit from skilled therapy to address remaining functional deficits  []   Not progressing toward goals and plan of care will be adjusted    Patient will continue to benefit from skilled therapy to address remaining functional deficits: speech intellgibility and swallowing     Progress towards goals / Updated goals:  1. The pt will increase quality and length of phonation by sustaining ah utilizing effective diaphragmatic breath support for >10 seconds in 3/3 sessions to increase functional speech intelligibility. 11/8/19: diaphragmatic breathing patterns with min cues; and in /coordinations with verbal tasks/vowel prolongation duration: average 8.5 secs across 5 trials with modA - pt education/modeling/training of easy onsets to decrease vocal strain   2. Pt will demonstrate ability to produce improve prosody, rhyme, rhythm in structured sentences, word emphasis tasks, and varying pitch phrases using various comp strategies with 80% acc given min-mod cues in 3/3opportunities to improve naturalness of speech. 11/8/19: not addressed this date   3. The patient will articulate (s/z) consonants at the word level- phrases level  with 90% acc with use of comp strategies and OMES  to improve speech intelligibility to > 90% acc when provided with min cues. 11/8/19: /s/ in multi-syllabic words with in the initial position with 80% accuracy and min-modA   4. The patient will articulate  (l, voiced /th/ consonants at the word level- phrases level  with 90% acc with use of comp strategies and OMES  to improve speech intelligibility to > 90% acc when provided with min cues.   11/8/19: voiced /th/ in phrases with 75% accuracy with Michael   5. Patient will complete tongue base retraction,and laryngeal/pharyngeal strengthening exercises (including Sheyla maneuver, Mendelsohn maneuver, modified Shaker with CTAR ball, hard /k/ in isolation,  effortful swallow, etc.), with min cues in 5/5 sessions in order to improve the strength/agility/efficiency of his swallow for improved swallowing safety and QOL. 11/8/19: re-education for risk of aspiration when completing sheyla with thin liquids in his mouth - modeling/training of taking sips using compensatory strategies between every 1-3 reps rather than during exercise - pt completed x10 with modA ; high /ee/ x25 with modA to increase precision    6. Patient will recall/demonstrate aspiration precautions and safe swallowing strategies with 100% acc when provided with occasional cues in 5/5 sessions (small sips/bites; chin tuck with all liquid intake; alternate liquids/solids; slow rate; NO straws, Sit upright at 90 degree angle during PO trials)  to decrease the reported incidences of dysphagia and s/sx of aspiration. 11/8/19:  Recalled strategies with 100% with min cues. Demonstrated with thin liquids with Michael . 7. Patient will tolerate trials of thin liquids (>4 ounces) using compensatory strategies without s/sx of aspiration/penetration when provided with Michael across 5/5 sessions. 11/8/19:  Patient tolerated ~8 oz of water with utilization of chin tuck without overt s/sx of aspiration, however cannot rule out silent aspiration. 8. Patient will tolerate regular solids with utilization of compensatory strategies without s/sx of aspiration/penetration or distress with Michael across 5/5 sessions.   11/8/19: Not addressed this date       PLAN  [x]  Continue plan of care  []  Modify Goals/Treatment Plan      []  Discharge due to:  [] Other:    Monse Ahuja, SILVERIO 11/8/2019  3:24 PM    Future Appointments   Date Time Provider Marc Ruvalcaba   11/8/2019  3:30 PM Tony Moreland, SILVERIO [facility]TPB [facility]   11/11/2019 12:15 PM SILVERIO Jarvis [facility]CPTPB [facility]   11/11/2019  1:00 PM Jasiel Talbot, PTA [facility]   11/11/2019  1:45 PM Pete Pierce LFFJSPO [facility]   11/13/2019 11:00 AM Nathalie Barfield, OTR/L [facility]CPTPB [facility]   11/13/2019 12:15 PM Tony Moreland, SILVERIO BAYPHTK [facility]   11/15/2019  1:30 PM Jasiel Talbot, PTA [facility]   11/15/2019  2:00 PM Nathalie Barfield, OTR/L MMCPTPB [facility]   11/15/2019  2:45 PM Tony Moreland, SILVERIO LWGCDNS [facility]   11/19/2019  3:00 PM Jasiel Talbot, PTA [facility]CPTPB [facility]   11/19/2019  3:30 PM Nathalie Barfield, OTR/L [facility]CPTPB [facility]   11/19/2019  4:45 PM SILVERIO Villegas GHXBRWD [facility]   11/20/2019  9:45 AM Tony Moreland, SILVERIO HQDEBUV [facility]   11/20/2019 10:30 AM Jasiel Talbot, PTA [facility]CPTPB [facility]   11/20/2019 11:00 AM Pete Pierce HONEGLE [facility]   11/22/2019  3:00 PM Jasiel Talbot, PTA MMCPTPB [facility]   11/22/2019  3:30 PM Pete Pierce HXZFQRZ [facility]   11/22/2019  4:15 PM Sabas Putnam, SLP [facility]   11/25/2019 12:00 PM Levon Cai, PT [facility]   11/25/2019  1:00 PM Nathalie Barfield, OTR/L [facility]CPTPB [facility]   11/25/2019  1:45 PM Tony Moreland, SILVERIO VUBRDDN [facility]   11/26/2019  1:45 PM SILVERIO Villegas FSWVHMS [facility]   11/26/2019  2:45 PM Pete Pierce BNPQTJX [facility]   11/27/2019  3:00 PM Jasiel Talbot, PTA [facility]CPTPB [facility]   11/27/2019  4:00 PM Pete Pierce WENSOOA [facility]   12/2/2019  1:00 PM Jasiel Talbot, PTA [facility]CPTPB [facility]   12/2/2019  1:45 PM Tony Moreland, SILVERIO UHPKKFK [facility]   12/2/2019  2:30 PM Nathalie Barfield, OTR/L MMCPTPB [facility]   12/4/2019 10:30 AM Jasiel Talbot, PTA [facility]   12/4/2019 11:00 AM Pete Pierce WUQPLCT [facility]   12/4/2019 12:00 PM SILVERIO Villegas ZGXFUSS [facility]   12/6/2019  1:15 PM Pete Pierce KJLAQWR [facility]   12/6/2019  2:00 PM SILVERIO Villegas ICLEDKM [facility]   12/9/2019  9:45 AM SILVERIO Villegas NMEXHME [facility]   12/9/2019 10:30 AM Levon Cai, PT [facility]CPTPB [facility]   12/9/2019 11:00 AM Sriram Montiel KNPJZMY [facility]   12/11/2019  9:45 AM Clinton Raymond, SILVERIO GMAWJKD [facility]   12/11/2019 10:30 AM Oliver Smith, PT [facility]CPTPB [facility]   12/11/2019 11:00 AM Shelly Shanks, OTR/L [facility]CPTPB [facility]   12/13/2019  8:30 AM Ann Marie Thurston MD [address] [facility]   12/13/2019  9:45 AM SILVERIO Eaton KBBCPYF [facility]   12/13/2019 10:30 AM Shelly Shanks, OTR/L MMCPTPB [facility]   12/16/2019 12:00 PM Oliver Smith, PT [facility]CPTPB [facility]   12/16/2019  1:00 PM Sriram Montiel UMCAMNF [facility]   12/16/2019  1:45 PM SILVERIO Eaton PWUBFHP [facility]   12/18/2019  2:00 PM Polo Coburn PTA MMCPTPB [facility]   12/18/2019  2:45 PM SILVERIO Eaton AIMKNVM [facility]   12/18/2019  3:30 PM Sriram Montiel YKWTPDV [facility]   12/20/2019  1:15 PM Sriram Montiel GSNDWXH [facility]   12/20/2019  2:00 PM Clinton Raymond, SILVERIO TRQSSGU [facility]   12/23/2019  9:00 AM Polo Coburn, PTA MMCPTPB [facility]   12/23/2019  9:30 AM Sriram Montiel AKADXNC [facility]   12/23/2019 10:30 AM Anshul Jacinto I, SLP HJRZHMB [facility]   12/24/2019  9:00 AM Polo Coburn, PTA MMCPTPB [facility]   12/24/2019  9:30 AM Sriram Montiel EQOQIXV [facility]   12/24/2019 10:15 AM Maribell Albright, SLP MMCPTPB [facility]   12/27/2019  9:45 AM Maribell Albright, SLP MMCPTPB [facility]   12/27/2019 10:30 AM Shelly Shanks, OTR/L MMCPTPB [facility]   12/30/2019  9:45 AM Anshul Jacinto I, SLP CYQSYVY [facility]   12/30/2019 10:30 AM Polo Coburn, PTA MMCPTPB [facility]   12/30/2019 11:00 AM Shelly Shanks, OTR/L MMCPTPB [facility]   12/31/2019  9:45 AM Shelly Shanks, OTR/L MMCPTPB [facility]   12/31/2019 10:30 AM Polo Coburn, PTA MMCPTPB [facility]   12/31/2019 11:15 AM Anshul Jacinto I, SLP LELLLYU [facility]   1/2/2020  9:45 AM Sriram Montiel OKYLJCV [facility]   1/2/2020 10:30 AM Clinton Raymond, SLP PHGJOSM [facility]

## 2019-11-11 ENCOUNTER — HOSPITAL ENCOUNTER (OUTPATIENT)
Dept: PHYSICAL THERAPY | Age: 64
Discharge: HOME | End: 2019-11-11
Payer: MEDICARE

## 2019-11-11 PROCEDURE — 97535 SELF CARE MNGMENT TRAINING: CPT

## 2019-11-11 PROCEDURE — 97110 THERAPEUTIC EXERCISES: CPT

## 2019-11-11 PROCEDURE — 97032 APPL MODALITY 1+ESTIM EA 15: CPT

## 2019-11-11 PROCEDURE — 97112 NEUROMUSCULAR REEDUCATION: CPT

## 2019-11-11 PROCEDURE — 92507 TX SP LANG VOICE COMM INDIV: CPT

## 2019-11-11 PROCEDURE — 92526 ORAL FUNCTION THERAPY: CPT

## 2019-11-11 NOTE — PROGRESS NOTES
OT DAILY TREATMENT NOTE 10-18    Patient Name: Karen Triplett  Date:2019  : 1955  [x]  Patient  Verified  [place]or: [place] MED[place] / Plan: [place] MED[place]ARE PART A & B / Product Type: Medicare /    In time:145  Out time:231  Total Treatment Time (min): 55  Visit #: 8 of 24    Medicare/BCBS Only   Total Timed Codes (min):  30 1:1 Treatment Time:  46     Treatment Area: Upper extremity weakness [R29.898]  Cerebral infarction, unspecified [I63.9]    SUBJECTIVE  Pain Level (0-10 scale): 0/10  Any medication changes, allergies to medications, adverse drug reactions, diagnosis change, or new procedure performed?: [x] No    [] Yes (see summary sheet for update)  Subjective functional status/changes:   [] No changes reported  I have only seen my hand move twice since the stroke     OBJECTIVE    Modality rationale: increase tissue extensibility and increase muscle contraction/control to improve the patients ability to flex elbow/wrist   Min Type Additional Details    [] Estim:  []Unatt       []IFC  []Premod                        []Other:  []w/ice   []w/heat  Position:  Location:   8 Bicep/  8 wrist [x] Estim: []Att    []TENS instruct  [x]NMES                    []Other:  []w/US   []w/ice   []w/heat  Position:  Location: Bicep, Wrist Extensors     []  Traction: [] Cervical       []Lumbar                       [] Prone          []Supine                       []Intermittent   []Continuous Lbs:  [] before manual  [] after manual    []  Ultrasound: []Continuous   [] Pulsed                           []1MHz   []3MHz Location:  W/cm2:    []  Iontophoresis with dexamethasone         Location: [] Take home patch   [] In clinic    []  Ice     []  heat  []  Ice massage  []  Laser   []  Anodyne Position:  Location:    []  Laser with stim  []  Other: Position:  Location:    []  Vasopneumatic Device Pressure:       [] lo [] med [] hi   Temperature: [] lo [] med [] hi   [x] Skin assessment post-treatment:  [x]intact []redness- no adverse reaction  []redness - adverse reaction:     20 min Neuromuscular Re-education:  []  See flow sheet :   Rationale: increase ROM and increase strength  to improve the patients ability to move right arm    Matched resistance provided to shoulder and elbow motions of Right UE  Scapular retractions/elevations with mirror   PROM Shoulder Abduction     10 min Self Care/Home Management: don AFO/Sneaker   Rationale: Adaptive Techniques  to improve the patients ability to don afo/sneaker with increased independence     Trunk rotation while seated at edge of mat to bring Right LE onto mat for increased ease of donning sneaker/AFO, able to perform task 3x with decreasing assistance each attempt       With   [] TE   [] TA   [] neuro   [] other: Patient Education: [x] Review HEP    [] Progressed/Changed HEP based on:   [] positioning   [] body mechanics   [] transfers   [] heat/ice application   [] Splint wear/care   [] Sensory re-education   [] scar management      [] other:            Other Objective/Functional Measures:     Good response with NMES at wrist extension      Pain Level (0-10 scale) post treatment: 0/10    ASSESSMENT/Changes in Function: ROM improving     Patient will continue to benefit from skilled OT services to modify and progress therapeutic interventions, address ROM deficits, address strength deficits, analyze and address soft tissue restrictions, analyze and cue movement patterns and instruct in home and community integration to attain remaining goals. []  See Plan of Care  []  See progress note/recertification  []  See Discharge Summary         Progress towards goals / Updated goals:  1.  Patient will be independent in home exercise program for AAROM of right UE to reduce level of assist for self care   Status at Eval/Last Progress Note: not addressed at eval  Status at Last Visit: Met 11/8/19     2.  Patient will be able to don right shoe with AFO with supervision.   Status at [facility] Progress Note:  Status at Last Visit: able to perform task with adaptive strategies, check for carryover 11/11/19     3.  Patient will be familiar with task modification to allow him to bathe left side without assist.  Status at Eval/Last Progress Note: not addressed at eval  Status at Last Visit: Education on adaptive bathing techniques/strategies, check for carryover 11/8/19        Long Term Goals: To be accomplished in [address]  1.  Patient will be able to place right arm on table using active motion for use of right hand as assist  Status at Eval/Last Progress Note:  Status at Last Visit:.still with poor active elbow flexion 11/1/19, active elbow flexion to place hand on table x 1 today 11/6/19     2.  Patient will be Modified independent for all self care tasks. Status at Eval/Last Progress Note: Not adressed  Status at Last Visit:progressing, adaptive techniques for donning right sneaker given with demo 11/8/19     3.  Patient will be able to use right hand as stabilizing assist for self care activities.   Status at Eval/Last Progress Note: unable   Status at Last Visit: Able to hold wash cloth intermittently With Right hand 11/8/19     4.  Patient will report improved ability to use right hand to assist with home care as shown by reduced limitation of at least 20% on Neuro quality of Life.   Status at Eval/Last Progress Note: 82% limitation   Status at Last Visit:       PLAN  []  Upgrade activities as tolerated     [x]  Continue plan of care  []  Update interventions per flow sheet       []  Discharge due to:_  []  Other:_      DARELL Morales 11/11/2019  12:23 PM    Future Appointments   Date Time Provider Marc Ruvalcaba   11/11/2019  1:00 PM Dee Marie, PTA MMCPTPB [facility]   11/11/2019  1:45 PM Kathleen Esquivel [email or web address] [facility]   11/13/2019 11:00 AM [facility] Collado [facility]   11/13/2019 12:15 PM SILVERIO Rossi MMCPTPB [facility]   11/14/2019  2:00 PM [facility] 1 [facility] [address]   11/15/2019  9:00 AM [address] SLEEP RM 1 [facility] [address]   11/15/2019  1:30 PM Royanne , PTA [id number] [address]   11/15/2019  2:00 PM Cherre Eth, OTR/L MMCPTPB [address]   11/15/2019  2:45 PM Ariane Webb, SLP [id number] [address]   11/19/2019  3:00 PM Roarturo , PTA MMCPTPB [address]   11/19/2019  3:30 PM Cherre Eth, OTR/L MMCPTPB [address]   11/19/2019  4:45 PM Ariane Webb, SLP [id number] [address]   11/20/2019  9:45 AM Ariane Webb, SLP [id number] [address]   11/20/2019 10:30 AM Soumyaarturo Omid, PTA MMCPTPB [address]   11/20/2019 11:00 AM Matt Dominguez MQABIVW [address]   11/22/2019  3:00 PM Radu Anne, PTA MMCPTPB [address]   11/22/2019  3:30 PM Matt Dominguez VMBXNDC [address]   11/22/2019  4:15 PM Jean-Paul Pan, SLP MMCPTPB [address]   11/25/2019 12:00 PM Ishan Vasquez, PT MMCPTPB [address]   11/25/2019  1:00 PM Cherre Eth, OTR/L MMCPTPB [address]   11/25/2019  1:45 PM Ariane Webb, SLP [id number] [address]   11/26/2019  1:45 PM Ariane Webb, SLP JVJUHLE [address]   11/26/2019  2:45 PM Matt Dominguez RPJYPRQ [address]   11/27/2019  3:00 PM Radu Goyalman, PTA MMCPTPB [address]   11/27/2019  4:00 PM Matt Dominguez FGSFFSG [address]   12/2/2019  1:00 PM Radu Anne, PTA MMCPTPB [address]   12/2/2019  1:45 PM Ariane Jon, SLP [id number] [address]   12/2/2019  2:30 PM Cherre Eth, OTR/L MMCPTPB [address]   12/4/2019 10:30 AM Radu Anne, PTA MMCPTPB [address]   12/4/2019 11:00 AM Matt Dominguez OYXHTER [address]   12/4/2019 12:00 PM Ariaen Jon, SLP [id number] [address]   12/6/2019  1:15 PM Matt Dominguez MIYCEWB [address]   12/6/2019  2:00 PM Ariane Webb, SLP MMCPTPB [address]   12/9/2019  9:45 AM Ariane Webb, SLP PJEPUWY [address]   12/9/2019 10:30 AM Ishan Vasquez, PT MMCPTPB [address]   12/9/2019 11:00 AM Matt Dominguez WBWSILE [address]   12/11/2019  9:45 AM Ariane Webb, SLP [id number] [address]   12/11/2019 10:30 AM Ishan Vasquez, PT MMCPTPB [address]   12/11/2019 11:00 AM Cherre Eth, OTR/L [facility]CP[facility]B [address]   12/13/2019  8:30 AM Juana Gurrola MD [address]   12/13/2019 9:45 AM Ariane Webb, SILVERIO MMCPTPB [facility]   12/13/2019 10:30 AM Cherre Eth, OTR/L MMCPTPB [facility]   12/16/2019 12:00 PM Ishan Vasquez, PT MMCPTPB [facility]   12/16/2019  1:00 PM [facility] TLEYGJJ [facility]   12/16/2019  1:45 PM Ariane Webb, SILVERIO [id number] [facility]   12/18/2019  2:00 PM Radu Anne, PTA MMCPTPB [facility]   12/18/2019  2:45 PM SILVERIO Chinchilla HPLOFVH [facility]   12/18/2019  3:30 PM [facility] [id number] [facility]   12/20/2019  1:15 PM [facility] [id number] [facility]   12/20/2019  2:00 PM SILVERIO Chinchilla OYHDTUG [facility]   12/23/2019  9:00 AM Radu Anne, PTA MMCPTPB [facility]   12/23/2019  9:30 AM [facility] JOBULKW [facility]   12/23/2019 10:30 AM SILVERIO Rivera IRNTIZB [facility]   12/24/2019  9:00 AM Radu Anne, PTA MMCPTPB [facility]   12/24/2019  9:30 AM [facility] TAAJEUE [facility]   12/24/2019 10:15 AM SILVERIO Carter MMCPTPB [facility]   12/27/2019  9:45 AM Garcia Ramos SLP MMCPTPB [facility]   12/27/2019 10:30 AM Cherre Eth, OTR/L MMCPTPB [facility]   12/30/2019  9:45 AM SILVERIO Rivera [id number] [facility]   12/30/2019 10:30 AM Radu Anne, PTA MMCPTPB [facility]   12/30/2019 11:00 AM Cherre Eth, OTR/L MMCPTPB [facility]   12/31/2019  9:45 AM Cherre Eth, OTR/L MMCPTPB [facility]   12/31/2019 10:30 AM Radu Anne, PTA MMCPTPB [facility]   12/31/2019 11:15 AM SILVERIO Rivera [id number] [facility]   1/2/2020  9:45 AM [facility] YUDAPXX [facility]   1/2/2020 10:30 AM SILVERIO Chinchilla GPSCKAO [facility]

## 2019-11-11 NOTE — PROGRESS NOTES
ST DAILY TREATMENT NOTE    Patient Name: Jose Zavala  Date:2019  : 1955  [x]  Patient  Verified  Payor: Payor: [place] MEDICARE / Plan: [place] MEDICARE PART A & B / Product Type: Medicare /   In time:12 Out time:1:03  Total Treatment Time (min): 40  Visit #: 7 of 24    Treatment Diagnosis: Dysarthria and anarthria [R47.1]    SUBJECTIVE  Pain Level (0-10 scale): 0  Any medication changes, allergies to medications, adverse drug reactions, diagnosis change, or new procedure performed?: [x] No    [] Yes (see summary sheet for update)    Subjective functional status/changes:   [x] No changes reported      OBJECTIVE  Treatment provided includes:  Increase/Improve:  []  Voice Quality []  Cognitive Linguistic Skills [x]  Laryngeal/Pharyngeal Exercises   []  Vocal Loudness []  Reading Comprehension [x]  Swallowing Skills    []  Vocal Cord Function []  Auditory Comprehension []  Oral Motor Skills   []  Resonance []  Writing Skills [x]  Compensatory strategies    []  Speech Intelligibility []  Expressive Language []  Attention   []  Breath Support/Coord.  []  Receptive language []  Memory   []  Articulation [x]  Safety Awareness []    []  Fluency []  Word Retrieval []      Treatment Provided:  Dysarthria Treatment:   - diaphragmatic breathing exercises with and without phonation with skilled training   - voiced /th/ in the initial position of words:  in phrases and loaded sentences ; /s/ in multi-syllabic words to increase speech intelligibility  -prosody work with stress emphasis from reading stimulus with words to be stressed underlined-in order to improve the naturalness of his speech and increase speech intelligibility   Dysphagia Treatment:   - po trials with skilled cueing for compensatory techniques to reduce risk of aspiration   - oropharyngeal strengthening exercises for increased strength and efficiency of swallow   - pt education for correct method for rickey (saliva only)  for mixed consistencies and risk of aspiration     Patient/Caregiver  Education: [x] Review HEP      HEP/Handouts given: stress handouts to improve speech naturalness/emphasis. Continue previous HEP    Pain Level (0-10 scale) post treatment: 0    ASSESSMENT   Improving with patient's swallowing exercises, improving with productions of targeted th (voiced) and /s/ words. Throat clear and cough 1 time each post swallow with thin liquid trials (cannot rule out aspiration). Patient stated that he frequently throat clears throughout the day. Prior to trials he did not throat clear nor cough today. []   Improving appropriately and progressing toward goals  [x]   Improving slowly and progressing toward goals  []   Approximating goals/maximum potential  []   Continues to benefit from skilled therapy to address remaining functional deficits  []   Not progressing toward goals and plan of care will be adjusted    Patient will continue to benefit from skilled therapy to address remaining functional deficits: dysarthria, dysphagia      Progress towards goals / Updated goals:  1. The pt will increase quality and length of phonation by sustaining ah utilizing effective diaphragmatic breath support for >10 seconds in 3/3 sessions to increase functional speech intelligibility. 11/10/19: diaphragmatic breathing patterns with min cues; and in /coordinations with verbal tasks/vowel prolongation duration: average 8.5 secs across 5 trials with modA . pt education/modeling/training of easy onsets to decrease vocal strain during the prolongations and also for high ee productions. 2. Pt will demonstrate ability to produce improve prosody, rhyme, rhythm in structured sentences, word emphasis tasks, and varying pitch phrases using various comp strategies with 80% acc given min-mod cues in 3/3opportunities to improve naturalness of speech.    11/10/19: stress work at the sentence level with different words in the same sentence underlined:70% acc with min-mod A, SLP modeling the targeted behaviors. 3. The patient will articulate (s/z) consonants at the word level- phrases level  with 90% acc with use of comp strategies and OMES  to improve speech intelligibility to > 90% acc when provided with min cues. 11/10/19: /s/ in multi-syllabic words  in the initial position with 80% accuracy and min-modA   4. The patient will articulate  (l, voiced /th/ consonants at the word level- phrases level  with 90% acc with use of comp strategies and OMES  to improve speech intelligibility to > 90% acc when provided with min cues. 11/8/19: from reading stimulus:  voiced /th/ in phrases with 85% acc with min A; loaded voiced th/ in sentences 80% acc with min cues. 5. Patient will complete tongue base retraction,and laryngeal/pharyngeal strengthening exercises (including Sheyla maneuver, Mendelsohn maneuver, modified Shaker with CTAR ball, hard /k/ in isolation,  effortful swallow, etc.), with min cues in 5/5 sessions in order to improve the strength/agility/efficiency of his swallow for improved swallowing safety and QOL. 11/10/19: re-education for risk of aspiration when completing sheyla with thin liquids in his mouth - modeling/training of taking sips using compensatory strategies between every 1-3 reps rather than during exercise - pt completed x15 with min A ; high /ee/ x25 with min A and mod A to use easier onsets to avoid strain.  hard k words: 52 produced with min cues to S     6. Patient will recall/demonstrate aspiration precautions and safe swallowing strategies with 100% acc when provided with occasional cues in 5/5 sessions (small sips/bites; chin tuck with all liquid intake; alternate liquids/solids; slow rate; NO straws, Sit upright at 90 degree angle during PO trials)  to decrease the reported incidences of dysphagia and s/sx of aspiration. 11/10/19:  Recalled strategies with 100% with min cues. Demonstrated with ~10 oz of  thin liquids with Michael .   7.  Patient will tolerate trials of thin liquids (>4 ounces) using compensatory strategies without s/sx of aspiration/penetration when provided with Michael across 5/5 sessions. 11/10/19:  Patient tolerated ~10 oz of water with utilization of chin tuck without overt s/sx of aspiration, however cannot rule out silent aspiration. Patient exhibited throat clear x1 and cough x1 post swallow. Cannot rule out aspiration. Patient advised to slow rate and to take a small sip each time. 8. Patient will tolerate regular solids with utilization of compensatory strategies without s/sx of aspiration/penetration or distress with Michael across 5/5 sessions.   11/10/19: Not addressed this date     PLAN  [x]  Continue plan of care  []  Modify Goals/Treatment Plan      []  Discharge due to:  [] Other:    SILVERIO Michaud 11/11/2019  12:23 PM    Future Appointments   Date Time Provider Marc Ruvalcaba   11/11/2019  1:00 PM Rina Gunter PTA [facility]CPTPB [facility]   11/11/2019  1:45 PM [place] DOYLE [facility]   11/13/2019 11:00 AM Rolly Harris, OTR/L [facility]CPTPB [facility]   11/13/2019 12:15 PM SILVERIO Dickson GIMUESJ [facility]   11/14/2019  2:00 PM [facility] SLEEP RM 1 MMCSL [facility]   11/15/2019  9:00 AM [facility] SLEEP RM 1 MMCSL [facility]   11/15/2019  1:30 PM Rina Gunter PTA [facility]CPTP[facility]   11/15/2019  2:00 PM Rolly Harris, OTR/L [facility]CPTPB [facility]   11/15/2019  2:45 PM SILVERIO Dickson MMCPTPB [facility]   11/19/2019  3:00 PM Rina Gunter PTA [facility]CP[facility]   11/19/2019  3:30 PM Rolly Harris, OTR/L [facility]CPTPB [facility]   11/19/2019  4:45 PM Evelyne Amaya, SLP [facility]CPTPB [facility]   11/20/2019  9:45 AM Evelyne Amaya SLP HBJFFBY [facility]   11/20/2019 10:30 AM Rina Gunter PTA [facility]CPTP[facility]   11/20/2019 11:00 AM [place] Fonseca ISTQTGQ [facility]   11/22/2019  3:00 PM Rina Gunter PTA [facility]CPTPB [facility]   11/22/2019  3:30 PM [place] Fonseca NUFDDGP [facility]   11/22/2019  4:15 PM Kevin Hdez, [address] MMCPTPB [facility]   11/25/2019 12:00 PM Polly Ramirez, PT [facility]   11/25/2019 1:00 PM Eleno Baltazar, OTR/L [facility]CP[facility]   11/25/2019  1:45 PM SILVERIO Weaver BCJOYXT [facility]   11/26/2019  1:45 PM SILVERIO Weaver ELTJINL [facility]   11/26/2019  2:45 PM Nancy Hayden CDFZUSO [facility]   11/27/2019  3:00 PM Clemente Pronto, PTA [facility]CPTPB [facility]   11/27/2019  4:00 PM Nancy Hayden RCIVCGV [facility]   12/2/2019  1:00 PM Clemente Pronto, PTA [facility]CPTP[facility]   12/2/2019  1:45 PM SILVERIO Weaver ULIGBTT [facility]   12/2/2019  2:30 PM Eleno Baltazar, OTR/L [facility]CPTPB [facility]   12/4/2019 10:30 AM Clemente Pronto, PTA [facility]   12/4/2019 11:00 AM Nancy Hayden PUJBJDY [facility]   12/4/2019 12:00 PM SILVERIO Weaver IWWPMCF [facility]   12/6/2019  1:15 PM Nancy Hayden NEXZTWN [facility]   12/6/2019  2:00 PM Kiki Miller SLP [facility]CPTPB [facility]   12/9/2019  9:45 AM SILVERIO Weaver RRUJHIO [facility]   12/9/2019 10:30 AM Florentino Koo, PT [facility]CPTPB [facility]   12/9/2019 11:00 AM Nancy Hayden XFMMXGB [facility]   12/11/2019  9:45 AM SILVERIO Weaver MMCPTPB [facility]   12/11/2019 10:30 AM Florentino Koo, PT [facility]   12/11/2019 11:00 AM Eleno Baltazar, OTR/L [facility]CPTPB [facility]   12/13/2019  8:30 AM Sandra Robbins MD [address]   12/13/2019  9:45 AM SILVERIO Weaver NDPIRLY [facility]   12/13/2019 10:30 AM Eleno Baltazar, OTR/L [facility]CPTPB [facility]   12/16/2019 12:00 PM Florentino Koo, SUKHWINDER [facility]CPTPB [facility]   12/16/2019  1:00 PM Nancy Hayden BLMYCBV [facility]   12/16/2019  1:45 PM SILVERIO Weaver JGVILLC [facility]   12/18/2019  2:00 PM Clemente Browne, PTA MMCPTPB [facility]   12/18/2019  2:45 PM SILVERIO Weaver VBURSLJ [facility]   12/18/2019  3:30 PM Nancy Hayden RNHHZKY [facility]   12/20/2019  1:15 PM Nancy Hayden LAQXLFK [facility]   12/20/2019  2:00 PM SILVERIO Weaver MKHFSFL [facility]   12/23/2019  9:00 AM Clemente Browne, PTA [facility]CPTPB [facility]   12/23/2019  9:30 AM Nancy Hayden UCDAZLS [facility]   12/23/2019 10:30 AM SILVERIO Stout YJPEPKZ [facility]   12/24/2019  9:00 AM Clemente Browne PTA MMCPTPB [facility]   12/24/2019  9:30 AM Kristan Chaparro Home OMUZNCI [facility]   12/24/2019 10:15 AM SILVERIO Gibson SJAEBUN [facility]   12/27/2019  9:45 AM Nancy Machado, SLP [facility]CPTPB [facility]   12/27/2019 10:30 AM Levonia Ohm, OTR/L [facility]CPTPB [facility]   12/30/2019  9:45 AM SILVERIO White TZICSXA [facility]   12/30/2019 10:30 AM Khari Gmaboas, PTA [facility]CPTPB [facility]   12/30/2019 11:00 AM Levonia Ohm, OTR/L [facility]CPTPB [facility]   12/31/2019  9:45 AM Levonia Ohm, OTR/L [facility]CPTPB [facility]   12/31/2019 10:30 AM Khari Reyez, PTA [facility]CPTP[facility]   12/31/2019 11:15 AM SILVERIO White KJXAWQS [facility]   1/2/2020  9:45 AM Severiano Reddish YBDHRYR [facility]   1/2/2020 10:30 AM José Miguel Reese, SILVERIO YQIBUSN [facility]

## 2019-11-11 NOTE — PROGRESS NOTES
PT DAILY TREATMENT NOTE 10-18    Patient Name: Anibal Olivares  Date:2019  : 1955  [x]  Patient  Verified  Payor: [place] MEDICARE / Plan: [place] MEDICARE PART A & B / Product Type: Medicare /    In time:105  Out time:135  Total Treatment Time (min): 30  Visit #: 7 of 24    Medicare/BCBS Only   Total Timed Codes (min):  30 1:1 Treatment Time:  30       Treatment Area: Lower extremity weakness [R29.898]  Cerebral infarction, unspecified [I63.9]    SUBJECTIVE  Pain Level (0-10 scale): 0/10  Any medication changes, allergies to medications, adverse drug reactions, diagnosis change, or new procedure performed?: [x] No    [] Yes (see summary sheet for update)  Subjective functional status/changes:   [] No changes reported  Pt stated that he is doing well today    OBJECTIVE    30 min Therapeutic Exercise:  [x] See flow sheet :   Rationale: increase ROM and increase strength to improve the patients ability to increase ease with ADLs    With   [x] TE   [] TA   [] neuro   [] other: Patient Education: [x] Review HEP    [] Progressed/Changed HEP based on:   [] positioning   [] body mechanics   [] transfers   [] heat/ice application    [] other:      Other Objective/Functional Measures:   Had swaying, but no LOB with static balance EC  Had some difficulty with counting today  Was able to use better form with right SLR today, less ext lag     Pain Level (0-10 scale) post treatment: 0/10    ASSESSMENT/Changes in Function:   Pt is slowly progressing toward goals. Pt cont to ambulate with SBQC. Cont to be slow with ambulation. Strength in B LE is slowly improving.  Sit to stands are improving    Patient will continue to benefit from skilled PT services to modify and progress therapeutic interventions, address functional mobility deficits, address ROM deficits, address strength deficits, analyze and cue movement patterns, analyze and modify body mechanics/ergonomics, assess and modify postural abnormalities, address imbalance/dizziness and instruct in home and community integration to attain remaining goals. [x]  See Plan of Care  []  See progress note/recertification  []  See Discharge Summary         Progress towards goals / Updated goals:  Short Term Goals: To be accomplished in 1 weeks:               1. Pt will be compliant with a HEP to improve function.   Goal met. 10/23/19     Long Term Goals: To be accomplished in 8 weeks:               1. Pt will ambulate 4 steps x 3 with 1 HR with supervision to be able to navigate stairs at home.                2. Pt will increase FOTO score by 7 pts to improve LE function.                3. Pt will increase right LE hip, quad and HS strength to 4/5 or greater to be able to navigate community distances. Slowly progressing. 11/8/19               4. Pt will demonstrate Romberg EO/EC on a compliant surface to decrease fall risk during ADL's.                0/. Pt will perform sit to stand x 10 to ease with transfers safely. Progressing. 10/30/19  MET.  11/4/19               6. Pt will ambulate with LRAD >150 ft to be able to navigate short community distances.     PLAN  []  Upgrade activities as tolerated     [x]  Continue plan of care  []  Update interventions per flow sheet       []  Discharge due to:_  []  Other:_      Erik Okeefe PTA 11/11/2019  12:53 PM    Future Appointments   Date Time Provider Marc Ruvalcaba   11/11/2019  1:00 PM [place] Nurse, PTA [facility]CPTP[facility]   11/11/2019  1:45 PM Dai Savage YLFVJXE [facility]   11/13/2019 11:00 AM MALLIKA Marion/L [facility]CPTPB [facility]   11/13/2019 12:15 PM SILVERIO Morrison MMCPTPB [facility]   11/14/2019  2:00 PM [facility] 1 MMCSL [facility]   11/15/2019  9:00 AM [facility] SLEEP RM 1 MMCSL [facility]   11/15/2019  1:30 PM [place] Nurse, PTA [facility]CPTPB [facility]   11/15/2019  2:00 PM Angeline Bustamante OTR/L [facility]CPTPB [facility]   11/15/2019  2:45 PM SILVERIO Morrison MMCPTPB [facility]   11/19/2019  3:00 PM [place] Nurse, PTA [facility]CPTP[facility]   11/19/2019  3:30 PM Mena Brown, OTR/L [facility]CPTPB [facility]   11/19/2019  4:45 PM Yifan Sanchez, SLP [facility]CPTPB [facility]   11/20/2019  9:45 AM SILVERIO Gutierres CDXAFPZ [facility]   11/20/2019 10:30 AM Margaret Newby, PTA [facility]CPTPB [facility]   11/20/2019 11:00 AM Naajoe Portal KFITFUJ [facility]   11/22/2019  3:00 PM Margaret Newby, PTA [facility]CPTPB [facility]   11/22/2019  3:30 PM Naajoe Portal NFWVPCG [facility]   11/22/2019  4:15 PM Sumit Ho SLP [facility]CPTPB [facility]   11/25/2019 12:00 PM Kian Cardoza, PT [facility]   11/25/2019  1:00 PM Mena Brown, OTR/L [facility]CPTPB [facility]   11/25/2019  1:45 PM Yifan Sanchez, SILVERIO MMCPTPB [facility]   11/26/2019  1:45 PM SILVERIO Gutierres FYUYFCX [facility]   11/26/2019  2:45 PM Isidro Portal RXNITDP [facility]   11/27/2019  3:00 PM Margaret Newby, PTA MMCPTPB [facility]   11/27/2019  4:00 PM Naajoe Portal YKPMLOZ [facility]   12/2/2019  1:00 PM Margaret Newby, PTA [facility]CPTPB [facility]   12/2/2019  1:45 PM SILVERIO Gutierres CXUPCGD [facility]   12/2/2019  2:30 PM Mena Brown, OTR/L [facility]CPTPB [facility]   12/4/2019 10:30 AM Margaret Newby, LINDA MMCPTPB [facility]   12/4/2019 11:00 AM Naajoe Portal NYETPKY [facility]   12/4/2019 12:00 PM SILVERIO Gutierres DZWEOBU [facility]   12/6/2019  1:15 PM Isidro Portal GZXOHWU [facility]   12/6/2019  2:00 PM SILVERIO Gutierres MMCPTPB [facility]   12/9/2019  9:45 AM SILVERIO Gutierres IWOEWGP [facility]   12/9/2019 10:30 AM Kian Cardoza, PT MMCPTPB [facility]   12/9/2019 11:00 AM Naaman Portal NZRQZMQ [facility]   12/11/2019  9:45 AM SILVERIO Gutierres MMCPTPB [facility]   12/11/2019 10:30 AM Kian Cardoza, PT [facility]CPTPB [facility]   12/11/2019 11:00 AM Mena Brown, OTR/L [facility]CPTPB [facility]   12/13/2019  8:30 AM Joby Soler MD [address]   12/13/2019  9:45 AM SILVERIO Gutierres XJEOIME [facility]   12/13/2019 10:30 AM Mena Brown, OTR/L [facility]CPTPB [facility]   12/16/2019 12:00 PM Kian Cardoza, PT [facility]CPTPB [facility]   12/16/2019  1:00 PM Isidro Ceja DZTDBUR [facility]   12/16/2019  1:45 PM SILVERIO Gutierres THLSJFU [facility]   12/18/2019  2:00 PM Margaret Newby, PTA MMCPTPB [facility]   12/18/2019  2:45 PM Tiffanie Sanchez, SILVERIO TOMEUPF [facility]   12/18/2019  3:30 PM Steffi Pearson PSHZDIB [facility]   12/20/2019  1:15 PM Steffi Pearson ZLTRAKP [facility]   12/20/2019  2:00 PM Tiffanie Sanchez, SLP SBDPCET [facility]   12/23/2019  9:00 AM Joseph Lutz PTA MMCPTPB [facility]   12/23/2019  9:30 AM Steffi Pearson FJBWNJX [facility]   12/23/2019 10:30 AM SILVERIO Gates ZXIFLTA [facility]   12/24/2019  9:00 AM Joseph Lutz PTA MMCPTPB [facility]   12/24/2019  9:30 AM Steffi Pearson QURTLJQ [facility]   12/24/2019 10:15 AM Burnis Shape, SLP LOIELBP [facility]   12/27/2019  9:45 AM Burnis Shape, SLP MMCPTPB [facility]   12/27/2019 10:30 AM Denis Patten, OTR/L MMCPTPB [facility]   12/30/2019  9:45 AM Dale Monteiro I SLP JUYJMXD [facility]   12/30/2019 10:30 AM Joseph Lutz PTA MMCPTPB [facility]   12/30/2019 11:00 AM Denis Patten, OTR/L MMCPTPB [facility]   12/31/2019  9:45 AM Denis Patten, OTR/L MMCPTPB [facility]   12/31/2019 10:30 AM Joseph Lutz PTA MMCPTPB [facility]   12/31/2019 11:15 AM SILVERIO Jordan REWQUNX [facility]   1/2/2020  9:45 AM Steffi Pearson [id number] [facility]   1/2/2020 10:30 AM SILVERIO Jordan GHJYMUM [facility]

## 2019-11-13 ENCOUNTER — HOSPITAL ENCOUNTER (OUTPATIENT)
Dept: PHYSICAL THERAPY | Age: 64
Discharge: HOME | End: 2019-11-13
Payer: MEDICARE

## 2019-11-13 ENCOUNTER — APPOINTMENT (OUTPATIENT)
Dept: PHYSICAL THERAPY | Age: 64
End: 2019-11-13
Payer: MEDICARE

## 2019-11-13 PROCEDURE — 92526 ORAL FUNCTION THERAPY: CPT

## 2019-11-13 PROCEDURE — 92507 TX SP LANG VOICE COMM INDIV: CPT

## 2019-11-13 NOTE — PROGRESS NOTES
ST DAILY TREATMENT NOTE    Patient Name: Evie Ball  Date:2019  : 1955  [x]  Patient  Verified  Payor: Payor: [place] / Plan: [place] MED[place]ARE PART A & B / Product Type: Medicare /   In time:12:17  Out time: 1:00  Total Treatment Time (min): 42  Visit #: 8 of 24    Treatment Diagnosis: Dysarthria and anarthria [R47.1] Oropharyngeal Dysphagia [R13.12]    SUBJECTIVE   Pain Level (0-10 scale): 0  Any medication changes, allergies to medications, adverse drug reactions, diagnosis change, or new procedure performed?: [x] No    [] Yes (see summary sheet for update)    Subjective functional status/changes:   [x] No changes reported  Pt reports compliance with HEP. OBJECTIVE  Treatment provided includes:  Increase/Improve:  [x]  Voice Quality []  Cognitive Linguistic Skills [x]  Laryngeal/Pharyngeal Exercises   []  Vocal Loudness []  Reading Comprehension [x]  Swallowing Skills    []  Vocal Cord Function []  Auditory Comprehension []  Oral Motor Skills   []  Resonance []  Writing Skills [x]  Compensatory strategies    [x]  Speech Intelligibility []  Expressive Language []  Attention   [x]  Breath Support/Coord.  []  Receptive language []  Memory   []  Articulation []  Safety Awareness []    []  Fluency []  Word Retrieval []        Treatment Provided:  Dysphagia Treatment (25 minutes):   - pharyngeal/laryngeal strengthening exercises with skilled training to increase efficiency of oropharyngeal swallow for nutrition and hydration   - po trials of thin liquids utilizing chin tuck to increase carry over and decrease risk of aspiration     Dysarthria Treatment (17 minutes):   - diaphragmatic breathing to increase breath support/coordination for speech production   - vowel prolongations with training to decrease strain and shaping for improved vocal quality and breath supports   - /th/ in sentences to increase articulatory precision       Patient/Caregiver  Education: [x] Review HEP      HEP/Handouts given: updated oropharyngeal strengthening HEP 2xdaily with handout     Pain Level (0-10 scale) post treatment: 0    ASSESSMENT   Progressing in oropharyngeal strength and endurance   []   Improving appropriately and progressing toward goals  [x]   Improving slowly and progressing toward goals  []   Approximating goals/maximum potential  [x]   Continues to benefit from skilled therapy to address remaining functional deficits  []   Not progressing toward goals and plan of care will be adjusted    Patient will continue to benefit from skilled therapy to address remaining functional deficits: dysphagia, dysarthria     Progress towards goals / Updated goals:  1. The pt will increase quality and length of phonation by sustaining ah utilizing effective diaphragmatic breath support for >10 seconds in 3/3 sessions to increase functional speech intelligibility. 11/13/19: diaphragmatic breathing in /coordinations with verbal tasks/vowel prolongation duration: longest -11 secs with mod-max. Pt averaged ~8 secs with modA. pt education/modeling/training of easy onsets to decrease vocal strain  2. Pt will demonstrate ability to produce improve prosody, rhyme, rhythm in structured sentences, word emphasis tasks, and varying pitch phrases using various comp strategies with 80% acc given min-mod cues in 3/3opportunities to improve naturalness of speech.   11/13/19: not addressed this date ; prior 11/10/19: stress work at the sentence level with different words in the same sentence underlined:70% acc with min-mod A, SLP modeling the targeted behaviors. 3. The patient will articulate (s/z) consonants at the word level- phrases level  with 90% acc with use of comp strategies and OMES  to improve speech intelligibility to > 90% acc when provided with min cues.   11/13/19: not addressed this date ; prior 11/10/19: /s/ in multi-syllabic words  in the initial position with 80% accuracy and min-modA   4. The patient will articulate  (l, voiced /th/ consonants at the word level- phrases level  with 90% acc with use of comp strategies and OMES  to improve speech intelligibility to > 90% acc when provided with min cues. 11/13/19: voiced /th/ in phrases with 88% acc with min A  5. Patient will complete tongue base retraction,and laryngeal/pharyngeal strengthening exercises (including Sheyla maneuver, Mendelsohn maneuver, modified Shaker with CTAR ball, hard /k/ in isolation,  effortful swallow, etc.), with min cues in 5/5 sessions in order to improve the strength/agility/efficiency of his swallow for improved swallowing safety and QOL. 11/13/19: pt completed high /ee/ x25 with min-mod A fading to Michael to decrease strain/pushing ; hard k words: 56 produced with min cues to S, training of mendelsohn maneuver 2-3 sec holds x12 with modA       6. Patient will recall/demonstrate aspiration precautions and safe swallowing strategies with 100% acc when provided with occasional cues in 5/5 sessions (small sips/bites; chin tuck with all liquid intake; alternate liquids/solids; slow rate; NO straws, Sit upright at 90 degree angle during PO trials)  to decrease the reported incidences of dysphagia and s/sx of aspiration. 11/13/19:  Recalled strategies with 100% with min cues. Demonstrated with ~4 oz of  thin liquids with min-modA - occasionally requiring prompts to tuck chin and continued prompting to take smaller sips     7. Patient will tolerate trials of thin liquids (>4 ounces) using compensatory strategies without s/sx of aspiration/penetration when provided with Michael across 5/5 sessions. 11/13/19:  Patient tolerated ~3.5 oz of water with utilization of chin tuck without overt s/sx of aspiration, however cannot rule out silent aspiration. Patient exhibited delayed throat clear x1 . Cannot rule out aspiration for additional trials.    8. Patient will tolerate regular solids with utilization of compensatory strategies without s/sx of aspiration/penetration or distress with Michael across 5/5 sessions.   11/13/19: Not addressed this date     PLAN  [x]  Continue plan of care  []  Modify Goals/Treatment Plan      []  Discharge due to:  [] Other:    SILVERIO Loco 11/13/2019  12:20 PM    Future Appointments   Date Time Provider Marc Peg   11/14/2019  2:00 PM [facility] SLEEP RM 1 MMCSL [facility]   11/15/2019  9:00 AM [facility] SLEEP RM 1 [facility]CSL [facility]   11/15/2019  1:30 PM Jasiel Talbot, LINDA MMCPTPB [facility]   11/15/2019  2:00 PM Nathalie Barfield OTR/L MMCPTPB [facility]   11/15/2019  2:45 PM SILVERIO Villegas [id number] [facility]   11/19/2019  3:00 PM Jasiel Talbot, PTA MMCPTPB [facility]   11/19/2019  3:30 PM Nathalie Barfield OTR/L MMCPTPB [facility]   11/19/2019  4:45 PM SILVERIO Villegas MMCPTPB [facility]   11/20/2019  9:45 AM SILVERIO Villegas YLQBSLT [facility]   11/20/2019 10:30 AM Jasiel Talbot, PTA MMCPTPB [facility]   11/20/2019 11:00 AM Pete Pierce [id number] [facility]   11/22/2019  3:00 PM Jasiel Talbot, PTA MMCPTPB [facility]   11/22/2019  3:30 PM Pete Pierce CUFDPYZ [facility]   11/22/2019  4:15 PM [employer], SLP MMCPTPB [facility]   11/25/2019 12:00 PM Levon Cai, PT MMCPTPB [facility]   11/25/2019  1:00 PM Nathalie Barfield, OTR/L MMCPTPB [facility]   11/25/2019  1:45 PM Tony Moreland, SLP MMCPTPB [facility]   11/26/2019  1:45 PM Tony Moreland, SILVERIO MBZKVCT [facility]   11/26/2019  2:45 PM Pete Pierce MLFUTKG [facility]   11/27/2019  3:00 PM Jasiel Talbot, PTA MMCPTPB [facility]   11/27/2019  4:00 PM Pete Pierce ISDZFHF [facility]   12/2/2019  1:00 PM Jasiel Talbot, [facility]TA [facility]   12/2/2019  1:45 PM Tony Moreland, SILVERIO TSPNITD [facility]   12/2/2019  2:30 PM Nathalie Barfield, OTR/L MMCPTPB [facility]   12/4/2019 10:30 AM Jasiel Talbot, PTA MMCPTPB [facility]   12/4/2019 11:00 AM Pete Pierce [id number] [facility]   12/4/2019 12:00 PM Tony Moreland, SLP SCIGALQ [facility]   12/6/2019  1:15 PM Pete Pierce PCAKPEP [facility]   12/6/2019  2:00 PM Tony Moreland, SLP [id number] [facility]   12/9/2019  9:45 AM Katy Amaya, Stefanie Bishop, SLP MMCPTPB [facility]   12/9/2019 10:30 AM Nneka Odom, PT MMCPTPB  [facility]   12/9/2019 11:00 AM Severa Saba YNVAKAH [facility]   12/11/2019  9:30 AM Phylliss Coast, PTA MMCPTPB  [facility]   12/11/2019 10:00 AM Jenise Vazquez I SLP TBVBCEN [facility]   12/11/2019 11:00 AM Kaiden Espino, OTR/L MMCPTPB [facility]   12/13/2019  8:30 AM Alyssia Covarrubias MD [address]   12/13/2019  9:45 AM SILVERIO Ace [id number] [facility]   12/13/2019 10:30 AM Kaiden Espino, OTR/L MMCPTPB [facility]   12/16/2019 12:00 PM Nneka Odom, PT MMCPTPB [facility]   12/16/2019  1:00 PM Severa Saba [id number] [facility]   12/16/2019  1:45 PM Nick Leigh, SILVERIO [id number] [facility]   12/18/2019  2:00 PM Christelleliss Coast, PTA MMCPTPB [facility]   12/18/2019  2:45 PM Nick Leigh, SILVERIO VQQXCTO [facility]   12/18/2019  3:30 PM Severa Saba HMBZIEQ [facility]   12/20/2019  1:00 PM Nick Leigh, SILVERIO [id number] [facility]   12/20/2019  2:00 PM Severa Saba NJODITL [facility]   12/23/2019  9:00 AM Alexandrass Coast, PTA MMCPTPB [facility]   12/23/2019  9:30 AM Severa Saba RFVWUOT [facility]   12/23/2019 10:30 AM Jenise Vazquez I, SLP XHLIWKT [facility]   12/24/2019  9:00 AM Alexandrass Coast, PTA MMCPTPB [facility]   12/24/2019  9:30 AM Severa Soco TCJUBPX [facility]   12/24/2019 10:15 AM Mike Daniel, SILVERIO MMCPTPB [facility]   12/27/2019  9:45 AM SILVERIO Kang MMCPTPB [facility]   12/27/2019 10:30 AM Kaiden Espino, OTR/L MMCPTPB [facility]   12/30/2019  9:45 AM Jenise Vazquez I, SILVERIO MOXYQLQ [facility]   12/30/2019 10:30 AM Alexandrass Coast, PTA MMCPTPB [facility]   12/30/2019 11:00 AM Kaiden Espino, OTR/L MMCPTPB [facility]   12/31/2019  9:45 AM Kaiden Espino, OTR/L MMCPTPB [facility]   12/31/2019 10:30 AM Christelleliss Coast, PTA MMCPTPB [facility]   12/31/2019 11:30 AM Jenise Vazquez I, SLP [id number] [facility]   1/2/2020  9:45 AM Severa Saba [id number] [facility]   1/2/2020 10:30 AM Nick Leigh SLP [id number] [facility]

## 2019-11-14 ENCOUNTER — HOSPITAL ENCOUNTER (OUTPATIENT)
Dept: SLEEP MEDICINE | Age: 64
Discharge: HOME | End: 2019-11-14
Payer: MEDICARE

## 2019-11-14 DIAGNOSIS — G47.33 OSA (OBSTRUCTIVE SLEEP APNEA): ICD-10-CM

## 2019-11-14 PROCEDURE — 95806 SLEEP STUDY UNATT&RESP EFFT: CPT

## 2019-11-15 ENCOUNTER — HOSPITAL ENCOUNTER (OUTPATIENT)
Dept: PHYSICAL THERAPY | Age: 64
Discharge: HOME | End: 2019-11-15
Payer: MEDICARE

## 2019-11-15 ENCOUNTER — HOSPITAL ENCOUNTER (OUTPATIENT)
Dept: SLEEP MEDICINE | Age: 64
Discharge: HOME | End: 2019-11-15
Payer: MEDICARE

## 2019-11-15 PROCEDURE — 97032 APPL MODALITY 1+ESTIM EA 15: CPT

## 2019-11-15 PROCEDURE — 92507 TX SP LANG VOICE COMM INDIV: CPT

## 2019-11-15 PROCEDURE — 97112 NEUROMUSCULAR REEDUCATION: CPT

## 2019-11-15 PROCEDURE — 97110 THERAPEUTIC EXERCISES: CPT

## 2019-11-15 PROCEDURE — 92526 ORAL FUNCTION THERAPY: CPT

## 2019-11-15 PROCEDURE — 97535 SELF CARE MNGMENT TRAINING: CPT

## 2019-11-15 NOTE — PROGRESS NOTES
ST DAILY TREATMENT NOTE    Patient Name: Kaveh November  Date:11/15/2019  : 1955  [x]  Patient  Verified  Payor: Payor: [place] MEDICARE / Plan: [place] MEDICARE PART A & B / Product Type: Medicare /   In time: 2:49  Out time: 3:30   Total Treatment Time (min):  41  Visit #: 9 of 24    Treatment Diagnosis: Dysarthria and anarthria [R47.1]    SUBJECTIVE  Pain Level (0-10 scale):0  Any medication changes, allergies to medications, adverse drug reactions, diagnosis change, or new procedure performed?: [x] No    [] Yes (see summary sheet for update)    Subjective functional status/changes:   [] No changes reported  Pt's wife accompanied him for treatment for carry over. Pt/wife report pt is eating more slowly and still coughs and regurgitates liquids at times      OBJECTIVE  Treatment provided includes:  Increase/Improve:  []  Voice Quality []  Cognitive Linguistic Skills [x]  Laryngeal/Pharyngeal Exercises   []  Vocal Loudness []  Reading Comprehension [x]  Swallowing Skills    []  Vocal Cord Function []  Auditory Comprehension []  Oral Motor Skills   []  Resonance []  Writing Skills [x]  Compensatory strategies    [x]  Speech Intelligibility []  Expressive Language []  Attention   [x]  Breath Support/Coord.  []  Receptive language []  Memory   []  Articulation []  Safety Awareness []    []  Fluency []  Word Retrieval []        Treatment Provided:  Dysphagia Treatment:  - oropharyngeal strengthening exercises with skilled training for precision to increased strength/endurance  - po trials of solids and thin liquids with skilled cueing to increase carry over and decrease risk of aspiration with use of compensatory strategies   Dysarthria Treatment:  - diaphragmatic breathing exercise with and without phonation with skilled training and shaping to increase breath support/coordination for speech production - phonatory tasks include vowel prolongations and automatics   - /s/ in multi-syllabic words to increase articulatory precision        Patient/Caregiver  Education: [x] Review HEP      HEP/Handouts given: continue HEP established     Pain Level (0-10 scale) post treatment: 0    ASSESSMENT   Progressing gradually in strength and endurance of oropharyngeal mechanism. Progressing in diaphragmatic breathing exercises   []   Improving appropriately and progressing toward goals  [x]   Improving slowly and progressing toward goals  []   Approximating goals/maximum potential  [x]   Continues to benefit from skilled therapy to address remaining functional deficits  []   Not progressing toward goals and plan of care will be adjusted    Patient will continue to benefit from skilled therapy to address remaining functional deficits: dysarthria , dysphagia     Progress towards goals / Updated goals:  1. The pt will increase quality and length of phonation by sustaining ah utilizing effective diaphragmatic breath support for >10 seconds in 3/3 sessions to increase functional speech intelligibility. 11/15/19: diaphragmatic breathing in /coordinations with verbal tasks/vowel prolongation duration: longest -12 secs with modA. Shaping for easy onsets to decrease vocal strain  2. Pt will demonstrate ability to produce improve prosody, rhyme, rhythm in structured sentences, word emphasis tasks, and varying pitch phrases using various comp strategies with 80% acc given min-mod cues in 3/3opportunities to improve naturalness of speech.   11/15/19: not addressed this date ; prior 11/10/19: stress work at the sentence level with different words in the same sentence underlined:70% acc with min-mod A, SLP modeling the targeted behaviors.   3. The patient will articulate (s/z) consonants at the word level- phrases level  with 90% acc with use of comp strategies and OMES  to improve speech intelligibility to > 90% acc when provided with min cues.   11/15/19: /s/ in multi-syllabic words  in the initial position with 80% accuracy with Michael   4. The patient will articulate  (l, voiced /th/ consonants at the word level- phrases level  with 90% acc with use of comp strategies and OMES  to improve speech intelligibility to > 90% acc when provided with min cues. 11/15/19: not addressed this date ; prior 11/13/19: voiced /th/ in phrases with 88% acc with min A  5. Patient will complete tongue base retraction,and laryngeal/pharyngeal strengthening exercises (including Sheyla maneuver, Mendelsohn maneuver, modified Shaker with CTAR ball, hard /k/ in isolation,  effortful swallow, etc.), with min cues in 5/5 sessions in order to improve the strength/agility/efficiency of his swallow for improved swallowing safety and QOL.    11/15/19: pt completed high /ee/ x25 with min-modA fading to Michael to decrease strain/pushing,  mendelsohn maneuver 2-3 sec holds x15 with modA, modified shaker with CTAR ball x15 5 sec holds, x5 extended 60 sec holds with Michael       6. Patient will recall/demonstrate aspiration precautions and safe swallowing strategies with 100% acc when provided with occasional cues in 5/5 sessions (small sips/bites; chin tuck with all liquid intake; alternate liquids/solids; slow rate; NO straws, Sit upright at 90 degree angle during PO trials)  to decrease the reported incidences of dysphagia and s/sx of aspiration. 11/15/19:  Recalled strategies with 100% with min cues. Pt taking smaller sips this date at a slow rate, difficulty completing double swallow. Min-modA for chin tuck, double swallow, alternate solids and liquids   7. Patient will tolerate trials of thin liquids (>4 ounces) using compensatory strategies without s/sx of aspiration/penetration when provided with Michael across 5/5 sessions. 11/15/19:  4oz of water : delayed throat clear noted 3x ; tolerates additional trials with min-modA for compensatory strategies - cannot rule out silent aspiration   8.  Patient will tolerate regular solids with utilization of compensatory strategies without s/sx of aspiration/penetration or distress with Michael across 5/5 sessions.   11/15/19: tolerates 4/5 trials of solid consistency with min-modA for compensatory strategies - throat clear noted 1x     PLAN  [x]  Continue plan of care  []  Modify Goals/Treatment Plan      []  Discharge due to:  [] Other:      SILVERIO Chowdhury 11/15/2019  2:04 PM    Future Appointments   Date Time Provider Marc Peg   11/15/2019  2:45 PM SILVERIO Ramos FKCGIVN [facility]   11/19/2019  3:00 PM Chaparro Mitchell, PTA MMCPTPB [facility]   11/19/2019  3:30 PM Chuyita Marie, OTR/L MMCPTPB [facility]   11/19/2019  4:45 PM SILVERIO Ramos RGPMHWV [facility]   11/20/2019  9:45 AM SILVERIO Ramos TBPEDDR [facility]   11/20/2019 10:30 AM Chaparro Holbrookp, PTA MMCPTPB [facility]   11/20/2019 11:00 AM Shan Cameron MIBBIBD [facility]   11/22/2019  3:00 PM Chaparro Mitchell, PTA MMCPTPB [facility]   11/22/2019  3:30 PM Sagar Powell JTDYZGB [facility]   11/22/2019  4:15 PM SILVERIO Conteh MMCPTPB [facility]   11/25/2019 12:00 PM Paris Valero, PT MMCPTPB [facility]   11/25/2019  1:00 PM Chuyita Marie, OTR/L MMCPTPB [facility]   11/25/2019  1:45 PM SILVERIO Ramos ZGSRPTF [facility]   11/26/2019  1:45 PM SILVERIO Ramos MDZKYHO [facility]   11/26/2019  2:45 PM Sagar Powell RLWOKUD [facility]   11/27/2019  3:00 PM Mayfield Stephen, PTA MMCPTPB [facility]   11/27/2019  4:00 PM Shan Cameron KFCCRCD [facility]   12/2/2019  1:00 PM Mayfield Stephen, PTA MMCPTPB [facility]   12/2/2019  1:45 PM SILVERIO Ramos OMNSFUQ [facility]   12/2/2019  2:30 PM Chuyita Marie OTR/L MMCPTPB [facility]   12/4/2019 10:30 AM Chaparro Holbrookp, PTA MMCPTPB [facility]   12/4/2019 11:00 AM Sagar Powell GYDMUPV [facility]   12/4/2019 12:00 PM SILVERIO Ramos HWWQMFF [facility]   12/6/2019  1:15 PM Sagar Powell GRTSDMI [facility]   12/6/2019  2:00 PM SILVERIO Ramos MMCPTPB [facility]   12/9/2019  9:45 AM SILVERIO Ramos IMZNHDT [facility]   12/9/2019 10:30 AM Paris Valero, PT MMCPTPB [facility]   12/9/2019 11:00 AM Sagar JOELAVYESENIA [facility]   12/11/2019  9:30 AM Tempie Omar, PTA MMCPTPB [facility]   12/11/2019 10:00 AM Laina Dunlap I, SLP MMCPTPB [facility]   12/11/2019 11:00 AM [place] Cord, OTR/L MMCPTPB [facility]   12/13/2019  8:30 AM Josey Rangel MD [address]   12/13/2019  9:45 AM Cici Barillas, SLP BUZPQHU [facility]   12/13/2019 10:30 AM [place] Cord, OTR/L MMCPTPB [facility]   12/16/2019 12:00 PM Maddy Moseley, PT MMCPTPB [facility]   12/16/2019  1:00 PM Do Lazcano TRKEKPP [facility]   12/16/2019  1:45 PM Cici Barillas, SLP AXQKBLX [facility]   12/18/2019  2:00 PM Tempie Omar, PTA MMCPTPB [facility]   12/18/2019  2:45 PM Cici Barillas, SLP YLKJZJX [facility]   12/18/2019  3:30 PM Do Lazcano BLGJLZX [facility]   12/20/2019  1:00 PM Cici Barillas, SLP JPRKVVN [facility]   12/20/2019  2:00 PM Do Lazcano KDVMKIC [facility]   12/23/2019  9:00 AM Tempie Omar, PTA MMCPTPB [facility]   12/23/2019  9:30 AM Do Lazcano SGSISKN [facility]   12/23/2019 10:30 AM Laina Dunlap I, SLP TFIRWFY [facility]   12/24/2019  9:00 AM Tempie Omar, PTA MMCPTPB [facility]   12/24/2019  9:30 AM Do [place] CUUYSNR [facility]   12/24/2019 10:15 AM Laina Dunlap I, SLP AVNJZQD [facility]   12/27/2019 10:30 AM Dejan Cord, OTR/L MMCPTPB [facility]   12/27/2019 11:15 AM René Ríos, SLP MMCPTPB [facility]   12/30/2019  9:45 AM Cici Barillas, SLP IFBLBSM [facility]   12/30/2019 10:30 AM Tempie Omar, PTA MMCPTPB [facility]   12/30/2019 11:00 AM [place] Cord, OTR/L MMCPTPB [facility]   12/31/2019  9:45 AM Dejan Cord, OTR/L MMCPTPB [facility]   12/31/2019 10:30 AM Tempie Omar, PTA MMCPTPB [facility]   12/31/2019 11:30 AM Laina Dunlap I, SLP KRAGHDD [facility]   1/2/2020  9:45 AM Do [place] DQVIJPI [facility]   1/2/2020 10:30 AM Cici Barillas, SLP AHHBVXI [facility]

## 2019-11-15 NOTE — PROGRESS NOTES
OT DAILY TREATMENT NOTE 10-18    Patient Name: Preston Amend  Date:11/15/2019  : 1955  [x]  Patient  Verified  Payor: [place] MEDICARE / Plan: [place] MEDICARE PART A & B / Product Type: Medicare /    In time:200  Out time:245  Total Treatment Time (min): 45  Visit #: 9 of 24    Medicare/BCBS Only   Total Timed Codes (min):  45 1:1 Treatment Time:  39     Treatment Area: Upper extremity weakness [R29.898]  Cerebral infarction, unspecified [I63.9]    SUBJECTIVE  Pain Level (0-10 scale): 0/10  Any medication changes, allergies to medications, adverse drug reactions, diagnosis change, or new procedure performed?: [x] No    [] Yes (see summary sheet for update)  Subjective functional status/changes:   [] No changes reported  I still can't get it over my foot ( AFO)    OBJECTIVE    Modality rationale: increase muscle contraction/control to improve the patients ability to move right arm   Min Type Additional Details    [] Estim:  []Unatt       []IFC  []Premod                        []Other:  []w/ice   []w/heat  Position:  Location:     20 [] Estim: []Att    []TENS instruct  [x]NMES                    []Other:  []w/US   []w/ice   []w/heat  Position:biceps and wrist extensors  Location:    []  Traction: [] Cervical       []Lumbar                       [] Prone          []Supine                       []Intermittent   []Continuous Lbs:  [] before manual  [] after manual    []  Ultrasound: []Continuous   [] Pulsed                           []1MHz   []3MHz W/cm2:  Location:    []  Iontophoresis with dexamethasone         Location: [] Take home patch   [] In clinic    []  Ice     []  heat  []  Ice massage  []  Laser   []  Anodyne Position:  Location:    []  Laser with stim  []  Other:  Position:  Location:    []  Vasopneumatic Device Pressure:       [] lo [] med [] hi   Temperature: [] lo [] med [] hi       [x] Skin assessment post-treatment:  [x]intact []redness- no adverse reaction    []redness - adverse reaction: 10 min Neuromuscular Re-education:  []  See flow sheet :   Rationale: increase ROM and increase strength  to improve the patients ability to flex elbow  AAROM recip elbow flex ext with tapping and quick stretch as needed      15 min Self Care/Home Management: AFO   Rationale: task modifications  to improve the patients ability to don AFO  Adapted AFO by using velcro at ankle level, then able to don AFO using previously reviewed adaptive technique of putting  leg upon bed    With   [] TE   [] TA   [] neuro   [] other: Patient Education: [x] Review HEP    [] Progressed/Changed HEP based on: AFO donning, plan to request NMES for home  [] positioning   [] body mechanics   [] transfers   [] heat/ice application   [] Splint wear/care   [] Sensory re-education   [] scar management      [] other:            Other Objective/Functional Measures:   Able to don AFO with modification to strapping  Able to flex elbow with NMES and UE at shoulder level     Pain Level (0-10 scale) post treatment: 0/10    ASSESSMENT/Changes in Function: Improving elbow flexion with and without NMES    Patient will continue to benefit from skilled OT services to modify and progress therapeutic interventions, address ROM deficits, address strength deficits, analyze and address soft tissue restrictions, analyze and cue movement patterns and instruct in home and community integration to attain remaining goals. []  See Plan of Care  []  See progress note/recertification  []  See Discharge Summary         Progress towards goals / Updated goals:  *1.  Patient will be independent in home exercise program for AAROM of right UE to reduce level of assist for self care   Status at Eval/Last Progress Note: not addressed at eval  Status at Last Visit: Met 11/8/19     2.  Patient will be able to don right shoe with AFO with supervision.   Status at Eval/Last Progress Note:  Status at Last Visit: able to perform task with adaptive strategies, check for carryover 11/11/19, further modification 11/15/19 with success     3.  Patient will be familiar with task modification to allow him to bathe left side without assist.  Status at Eval/Last Progress Note: not addressed at eval  Status at Last Visit: Education on adaptive bathing techniques/strategies, check for carryover 11/8/19        Long Term Goals: To be accomplished in [address]  1.  Patient will be able to place right arm on table using active motion for use of right hand as assist  Status at Eval/Last Progress Note:  Status at Last Visit:.still with poor active elbow flexion 11/1/19, active elbow flexion to place hand on table x 1 today 11/6/19     2.  Patient will be Modified independent for all self care tasks. Status at Eval/Last Progress Note: Not adressed  Status at Last Visit:progressing, adaptive techniques for donning right sneaker given with demo 11/8/19     3.  Patient will be able to use right hand as stabilizing assist for self care activities.   Status at Eval/Last Progress Note: unable   Status at Last Visit: Able to hold wash cloth intermittently With Right hand 11/8/19     4.  Patient will report improved ability to use right hand to assist with home care as shown by reduced limitation of at least 20% on Neuro quality of Life.   Status at Eval/Last Progress Note: 82% limitation   Status at Last Visit:      **    PLAN  []  Upgrade activities as tolerated     [x]  Continue plan of care  []  Update interventions per flow sheet       []  Discharge due to:_  []  Other:_      MALLIKA Jonas/L 11/15/2019  2:50 PM    Future Appointments   Date Time Provider Marc Ruvalcaba   11/19/2019  3:00 PM Magan Villalobos MMCPTPB [address]   11/19/2019  3:30 PM Lilian Linder OTR/L MMCPTPB [address]   11/19/2019  4:45 PM Orville Ríos, SILVERIO MMCPTPB [address]   11/20/2019  9:45 AM SILVERIO Robison YSQJTRL [address]   11/20/2019 10:30 AM John Connolly PTA MMCPTPB [address]   11/20/2019 11:00 AM Kristan Link ALCARAZ [facility]   11/22/2019  3:00 PM Pricila Gottlieb, PTA MMCPTPB [facility]   11/22/2019  3:30 PM Solitariosharad IBARRAHUSMS [facility]   11/22/2019  4:15 PM Alec Sacks, SLP MMCPTPB [facility]   11/25/2019 12:00 PM Elena Baltazar, PT MMCPTPB [facility]   11/25/2019  1:00 PM Claudetta Hickory, OTR/L MMCPTPB [facility]   11/25/2019  1:45 PM Cortez Louis, SLP WJSCNKN [facility]   11/26/2019  1:45 PM Cortez Louis, SLP ZCKBLUU [facility]   11/26/2019  2:45 PM Solitario Kari PGUJDIV [facility]   11/27/2019  3:00 PM Pricila Gottlieb, PTA MMCPTPB [facility]   11/27/2019  4:00 PM Solitario Lakes MATMTCQ [facility]   12/2/2019  1:00 PM Pricila Gottlieb, PTA MMCPTPB [facility]   12/2/2019  1:45 PM Cortez Louis, SLP WEKDUJU [facility]   12/2/2019  2:30 PM Claudetta Hickory, OTR/L MMCPTPB [facility]   12/4/2019 10:30 AM Pricila Gottlieb, PTA MMCPTPB [facility]   12/4/2019 11:00 AM Solitario Kari OVHOUEG [facility]   12/4/2019 12:00 PM Cortez Louis, SLP IRKFVCZ [facility]   12/6/2019  1:15 PM Solitario Kari OKUCEEW [facility]   12/6/2019  2:00 PM Cortez Louis, SLP MMCPTPB [facility]   12/9/2019  9:45 AM SILVERIO Garza PONQAJM [facility]   12/9/2019 10:30 AM Elena Baltazar, PT MMCPTPB [facility]   12/9/2019 11:00 AM Solitario Kari EZLTHXY [facility]   12/11/2019  9:30 AM Pricila Gottlieb, PTA MMCPTPB [facility]   12/11/2019 10:00 AM SILVERIO Parsons MMCPTPB [facility]   12/11/2019 11:00 AM Claudetta Hickory, OTR/L MMCPTPB [facility]   12/13/2019  8:30 AM Néstor Worley MD [address]   12/13/2019  9:45 AM SILVERIO Garza SIPGRZE [facility]   12/13/2019 10:30 AM Claudetta Hickory, OTR/L MMCPTPB [facility]   12/16/2019 12:00 PM Elena Baltazar, PT MMCPTPB [facility]   12/16/2019  1:00 PM Solitario Kari PZWRNCY [facility]   12/16/2019  1:45 PM Cortez Louis SLP PKLTUTC [facility]   12/18/2019  2:00 PM Pricila Gottlieb PTA MMCPTPB [facility]   12/18/2019  2:45 PM Cortez Louis SLP JRTUTFH [facility]   12/18/2019  3:30 PM Solitario Kari UIPBLIT [facility]   12/20/2019  1:00 PM SILVERIO Garza RJPHGHP [facility]YN [facility]   12/20/2019  2:00 PM Solitario Pierce NCKGOIQ [facility]   12/23/2019  9:00 AM Rachel Randall, PTA MMCPTPB [facility]   12/23/2019  9:30 AM Lorriegin Schaefer WZOEKPJ [facility]   12/23/2019 10:30 AM Jenny Davila I, SLP CQVQGAN [facility]   12/24/2019  9:00 AM Rachel Pereira, PTA MMCPTPB [facility]   12/24/2019  9:30 AM Lorrie Schaefer TMAALWQ [facility]   12/24/2019 10:15 AM SILVERIO Rolon VLGCNJI [facility]   12/27/2019 10:30 AM Christiano Harper, OTR/L MMCPTPB [facility]   12/27/2019 11:15 AM Faye Elias SLP MMCPTPB [facility]   12/30/2019  9:45 AM SILVERIO Rolon NPQEGTL [facility]   12/30/2019 10:30 AM Rachel Pereira, PTA MMCPTPB [facility]   12/30/2019 11:00 AM Christiano Harper, OTR/L MMCPTPB [facility]   12/31/2019  9:45 AM Christiano Harper, OTR/L MMCPTPB [facility]   12/31/2019 10:30 AM Rachel Pereira, PTA MMCPTPB [facility]   12/31/2019 11:30 AM Jenny Davila I, SLP BOXADJS [facility]   1/2/2020  9:45 AM Lorrie Schaefer XCMTTUS [facility]   1/2/2020 10:30 AM SILVERIO Rolon FQUZLXM [facility]

## 2019-11-15 NOTE — PROGRESS NOTES
PT DAILY TREATMENT NOTE 10-18    Patient Name: Lanny Rodgers  Date:11/15/2019  : 1955  [x]  Patient  Verified  Payor: [place] MEDICARE / Plan: [place] MEDICARE PART A & B / Product Type: Medicare /    In time:128  Out time:200  Total Treatment Time (min): 32  Visit #: 8 of 24    Medicare/BCBS Only   Total Timed Codes (min):  32 1:1 Treatment Time:  32       Treatment Area: Lower extremity weakness [R29.898]  Cerebral infarction, unspecified [I63.9]    SUBJECTIVE  Pain Level (0-10 scale): 0/10  Any medication changes, allergies to medications, adverse drug reactions, diagnosis change, or new procedure performed?: [x] No    [] Yes (see summary sheet for update)  Subjective functional status/changes:   [] No changes reported  Pt stated that he is doing well today    OBJECTIVE    32 min Therapeutic Exercise:  [x] See flow sheet :   Rationale: increase ROM and increase strength to improve the patients ability to increase ease with ADLs    With   [x] TE   [] TA   [] neuro   [] other: Patient Education: [x] Review HEP    [] Progressed/Changed HEP based on:   [] positioning   [] body mechanics   [] transfers   [] heat/ice application    [] other:      Other Objective/Functional Measures:   Was unable to perform low negin step overs on the right so 2x4 was used, had minimal difficulty clearing  Sit to stands with right back is improving  No LOB with static balance   Cont to have difficulty with step taps     Pain Level (0-10 scale) post treatment: 0/10    ASSESSMENT/Changes in Function:   Pt is making slow progress toward goals. Pt cont with significant weakness in the right LE. Static balance is improving. Pt cont to ambulate with SBQC.      Patient will continue to benefit from skilled PT services to modify and progress therapeutic interventions, address functional mobility deficits, address ROM deficits, address strength deficits, analyze and cue movement patterns, analyze and modify body mechanics/ergonomics, assess and modify postural abnormalities, address imbalance/dizziness and instruct in home and community integration to attain remaining goals. [x]  See Plan of Care  []  See progress note/recertification  []  See Discharge Summary         Progress towards goals / Updated goals:  Short Term Goals: To be accomplished in 1 weeks:               1. Pt will be compliant with a HEP to improve function.   Goal met. 10/23/19     Long Term Goals: To be accomplished in 8 weeks:               1. Pt will ambulate 4 steps x 3 with 1 HR with supervision to be able to navigate stairs at home.                2. Pt will increase FOTO score by 7 pts to improve LE function.                3. Pt will increase right LE hip, quad and HS strength to 4/5 or greater to be able to navigate community distances. Slowly progressing. 11/8/19               4. Pt will demonstrate [place]berg EO/EC on a compliant surface to decrease fall risk during ADL's. Progressing. Pt is able to maintain balance on floor in [place] with EO and EC. 11/15/19               5/. Pt will perform sit to stand x 10 to ease with transfers safely. MET.  11/4/19               6. Pt will ambulate with LRAD >150 ft to be able to navigate short community distances.     PLAN  []  Upgrade activities as tolerated     [x]  Continue plan of care  []  Update interventions per flow sheet       []  Discharge due to:_  []  Other:_      Jesi Cote PTA 11/15/2019  1:30 PM    Future Appointments   Date Time Provider Marc Ruvalcaba   11/15/2019  2:00 PM Jose F Jewell OTR/L MMCPTPB [facility]   11/15/2019  2:45 PM SILVERIO Oliveira MMCPTPB [facility]   11/19/2019  3:00 PM Sally Kumar PTA [facility]CPTPB [facility]   11/19/2019  3:30 PM Jose F Jewell OTR/L MMCPTPB [facility]   11/19/2019  4:45 PM SILVERIO Oliveira MMCPTPB [facility]   11/20/2019  9:45 AM SILVERIO Oliveira ECLOOPA [facility]   11/20/2019 10:30 AM Sally Kumar PTA MMCPTPB [facility]   11/20/2019 11:00 AM Richi Monteiro HJMKPKK [facility] 11/22/2019  3:00 PM Komal Ortega, PTA MMCPTPB [facility]   11/22/2019  3:30 PM Sharene Backer RIKQPUZ [facility]   11/22/2019  4:15 PM Heron Rivera, SLP MMCPTPB [facility]   11/25/2019 12:00 PM Junie Yeung, PT MMCPTPB [facility]   11/25/2019  1:00 PM Morris Elizabeth, OTR/L MMCPTPB [facility]   11/25/2019  1:45 PM Neville Hernandez, SLP MLBAQAF [facility]   11/26/2019  1:45 PM Neville Hernandez, SLP RGKXIFV [facility]   11/26/2019  2:45 PM [facility]GS [facility]   11/27/2019  3:00 PM Komal Ortega, PTA MMCPTPB [facility]   11/27/2019  4:00 PM Sharene Backer XRKIWTN [facility]   12/2/2019  1:00 PM Komal Ortega, PTA MMCPTPB [facility]   12/2/2019  1:45 PM Neville Hernandez, SLP IQHNKAL [facility]   12/2/2019  2:30 PM Morris Elizabeth, OTR/L MMCPTPB [facility]   12/4/2019 10:30 AM Komal Ortega, PTA MMCPTPB [facility]   12/4/2019 11:00 AM Sharene Backer RCTWHUZ [facility]   12/4/2019 12:00 PM Neville Hernandez, SLP PMWHIIH [facility]   12/6/2019  1:15 PM Sharene Backer BVLGEVT [facility]   12/6/2019  2:00 PM Neville Hernandez, SILVERIO WXNZSEY [facility]   12/9/2019  9:45 AM SILVERIO Gould QQJZTHQ [facility]   12/9/2019 10:30 AM Junie Yeung PT MMCPTPB [facility]   12/9/2019 11:00 AM Christiano Cabrera PXIVEZC [facility]   12/11/2019  9:30 AM Komal Ortega, PTA MMCPTPB [facility]   12/11/2019 10:00 AM SILVERIO Ray MMCPTPB [facility]   12/11/2019 11:00 AM Morris lEizabeth, OTR/L MMCPTPB [facility]   12/13/2019  8:30 AM Marisabel Tamayo MD [address] [facility]   12/13/2019  9:45 AM SILVERIO Gould [id number] [facility]   12/13/2019 10:30 AM Morris Elizabeth, OTR/L MMCPTPB [facility]   12/16/2019 12:00 PM Junie Yeung, SUKHWINDER MMCPTPB [facility]   12/16/2019  1:00 PM Christiano Cabrera OOJMMPN [facility]   12/16/2019  1:45 PM SILVERIO Gould ZJKCYLZ [facility]   12/18/2019  2:00 PM Komal Ortega, PTA MMCPTPB [facility]   12/18/2019  2:45 PM SILVERIO Gould JGKXFXL [facility]   12/18/2019  3:30 PM Christiano Cabrera RKXOXOW [facility]   12/20/2019  1:00 PM SILVERIO Gould HHXOPHF [facility]   12/20/2019  2:00 PM Christiano Cabrera FGRQSWF [facility]   12/23/2019 9:00 AM Pricila Gottlieb, PTA MMCPTPB [facility]   12/23/2019  9:30 AM Solitario Pierce AABDKCT [facility]   12/23/2019 10:30 AM Anastasia Carrillo I, SLP BGMWRTD [facility]   12/24/2019  9:00 AM Pricila Gottlieb PTA MMCPTPB [facility]   12/24/2019  9:30 AM Solitario Pierce KBHTKYK [facility]   12/24/2019 10:15 AM Cortez Louis, SLP QEMFKYK [facility]   12/27/2019 10:30 AM Claudetta Hickory, OTR/L MMCPTPB [facility]   12/27/2019 11:15 AM Elenita Dacosta, SLP MMCPTPB [facility]   12/30/2019  9:45 AM Cortez Louis, SLP [id number] [facility]   12/30/2019 10:30 AM Pricila Gottlieb, PTA MMCPTPB [facility]   12/30/2019 11:00 AM Claudetta Hickory, OTR/L MMCPTPB [facility]   12/31/2019  9:45 AM Claudetta Hickory, OTR/L MMCPTPB [facility]   12/31/2019 10:30 AM Pricila Gottlieb, PTA MMCPTPB [facility]   12/31/2019 11:30 AM Cortez Louis, SLP ZAJKEGO [facility]   1/2/2020  9:45 AM Solitario Pierce XDHNTYK [facility]   1/2/2020 10:30 AM Cortez Louis, SLP EFOYWKS [facility]

## 2019-11-18 DIAGNOSIS — G47.33 OSA (OBSTRUCTIVE SLEEP APNEA): Primary | ICD-10-CM

## 2019-11-19 ENCOUNTER — HOSPITAL ENCOUNTER (OUTPATIENT)
Dept: PHYSICAL THERAPY | Age: 64
Discharge: HOME | End: 2019-11-19
Payer: MEDICARE

## 2019-11-19 PROCEDURE — 97032 APPL MODALITY 1+ESTIM EA 15: CPT

## 2019-11-19 PROCEDURE — 97112 NEUROMUSCULAR REEDUCATION: CPT

## 2019-11-19 PROCEDURE — 97535 SELF CARE MNGMENT TRAINING: CPT

## 2019-11-19 PROCEDURE — 92507 TX SP LANG VOICE COMM INDIV: CPT

## 2019-11-19 PROCEDURE — 92526 ORAL FUNCTION THERAPY: CPT

## 2019-11-19 PROCEDURE — 97110 THERAPEUTIC EXERCISES: CPT

## 2019-11-19 NOTE — PROGRESS NOTES
OT DAILY TREATMENT NOTE 10-18    Patient Name: Hong Hernandez  Date:2019  : 1955  [x]  Patient  Verified  Payor: [place] MEDICARE / Plan: [place] MEDICARE PART A & B / Product Type: Medicare /    In time:330  Out time:415  Total Treatment Time (min): 45  Visit #: 10 of 24    Medicare/BCBS Only   Total Timed Codes (min):  45 1:1 Treatment Time:  45     Treatment Area: Upper extremity weakness [R29.898]  Cerebral infarction, unspecified [I63.9]    SUBJECTIVE  Pain Level (0-10 scale): 0/10  Any medication changes, allergies to medications, adverse drug reactions, diagnosis change, or new procedure performed?: [x] No    [] Yes (see summary sheet for update)  Subjective functional status/changes:   [] No changes reported  I have ordered long handled sponge    OBJECTIVE    Modality rationale: increase muscle contraction/control to improve the patients ability to extend wrist   Min Type Additional Details    [] Estim:  []Unatt       []IFC  []Premod                        []Other:  []w/ice   []w/heat  Position:  Location:     15 [] Estim: []Att    []TENS instruct  [x]NMES                    []Other:  []w/US   []w/ice   []w/heat  Position:wrist and digit extensors  Location:    []  Traction: [] Cervical       []Lumbar                       [] Prone          []Supine                       []Intermittent   []Continuous Lbs:  [] before manual  [] after manual    []  Ultrasound: []Continuous   [] Pulsed                           []1MHz   []3MHz W/cm2:  Location:    []  Iontophoresis with dexamethasone         Location: [] Take home patch   [] In clinic    []  Ice     []  heat  []  Ice massage  []  Laser   []  Anodyne Position:  Location:    []  Laser with stim  []  Other:  Position:  Location:    []  Vasopneumatic Device Pressure:       [] lo [] med [] hi   Temperature: [] lo [] med [] hi              15 min Neuromuscular Re-education:  []  See flow sheet :   Rationale: increase ROM and increase strength  to improve the patients ability to move right arm  Arm to table x 5 today without assist  Supine faciliataiton place and hold ot keep arm in flexion achieved 16 sec hold  Not able to keep arm up and flex extend elbow  Seated elbow flex ext on raised table with good success x 10      15 min Self Care/Home Management: Self care   Rationale: increase ROM and increase strength  to improve the patients ability to bathe, use right as assist  BAthing review patient able to verbalize and demo technique  Able to use right hand ot hold pill bottles and open with left    With   [] TE   [] TA   [] neuro   [] other: Patient Education: [x] Review HEP    [] Progressed/Changed HEP based on: progress made  [] positioning   [] body mechanics   [] transfers   [] heat/ice application   [] Splint wear/care   [] Sensory re-education   [] scar management      [] other:            Other Objective/Functional Measures:   REsponded well to NMES for wrist extension and digit extension today. Responded well to NMES for elbow flexion on earlier visit  Now able to actively flex elbow in gravity eliminated plane wihtout NMES   Now able to actively flex elbow to place hand on table  Pain Level (0-10 scale) post treatment: 0/10    ASSESSMENT/Changes in Function: Improving ROm, strength, funciton. 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 Patient will continue to benefit from skilled OT services to modify and progress therapeutic interventions, address ROM deficits, address strength deficits, analyze and cue movement patterns and instruct in home and community integration to attain remaining goals. []  See Plan of Care  [x]  See progress note/recertification  []  See Discharge Summary         Progress towards goals / Updated goals:  *1.  Patient will be independent in home exercise program for AAROM of right UE to reduce level of assist for self care   Status at Eval/Last Progress Note: not addressed at eval  Status at Last Visit: Met 11/8/19     2.  Patient will be able to don right shoe with AFO with supervision. Status at Eval/Last Progress Note:  Current status:  min assist to fix heel of shoe with shoehoen     3.  Patient will be familiar with task modification to allow him to bathe left side without assist.  Status at Eval/Last Progress Note: not addressed at eval  Current status;: Education on adaptive bathing techniques/strategies,ordered long handled sponge, met 11/18/19        Long Term Goals: To be accomplished in [address]  1.  Patient will be able to place right arm on table using active motion for use of right hand as assist  Status at Eval/Last Progress Note:  Current status: , met x 5 today 11/18/19     2.  Patient will be Modified independent for all self care tasks. Status at Eval/Last Progress Note: Not adressed  Current status::progressing, adaptive techniques for donning right sneaker given with demo 11/8/19, awaiting long handled sponge, still with min assist to get heel over AFO correctly 11/18/19     3.  Patient will be able to use right hand as stabilizing assist for self care activities.   Status at Eval/Last Progress Note: unable   Current status:  Able to hold wash cloth intermittently With Right hand 11/8/19, met with pill bottles 11/18/19     4.  Patient will report improved ability to use right hand to assist with home care as shown by reduced limitation of at least 20% on Neuro quality of Life.   Status at Eval/Last Progress Note: 82% limitation   Current status:Not reassessed      PLAN  []  Upgrade activities as tolerated     [x]  Continue plan of care  []  Update interventions per flow sheet       []  Discharge due to:_  []  Other:_      MALLIKA Hager/L 11/19/2019  4:40 PM    Future Appointments   Date Time Provider Marc Ruvalcaba   11/19/2019  4:45 PM Cristal Son, SILVERIO MMCPTPB [facility]   11/20/2019  9:45 AM SILVERIO Alicea VPTHHMK [facility]   11/20/2019 10:30 AM Myrtle Taveras PTA MMCPTPB [facility] 11/20/2019 11:00 AM Richi Monteiro ZQTJQGO [facility]   11/22/2019  3:00 PM Sally Kumar, PTA MMCPTPB [facility]   11/22/2019  3:30 PM Richi Monteiro EBPPJBA [facility]   11/22/2019  4:15 PM April Austin, SLP MMCPTPB [facility]   11/25/2019 12:00 PM Ramsey Booth, PT MMCPTPB [facility]   11/25/2019  1:00 PM Jose F Jewell, OTR/L MMCPTPB [facility]   11/25/2019  1:45 PM Gema Burger, SLP RZRSNTM [facility]   11/26/2019  1:45 PM Gema Burger, SLP ERWKMWB [facility]   11/26/2019  2:45 PM Richi Monteiro RDYCHUO [facility]   11/27/2019  3:00 PM Sally Kumar, PTA MMCPTPB [facility]   11/27/2019  4:00 PM Richi Monteiro YWNNNCI [facility]   12/2/2019  1:00 PM Sally Kumar, PTA MMCPTPB [facility]   12/2/2019  1:45 PM Gema Burger, SLP CBKPTBT [facility]   12/2/2019  2:30 PM Jose F Jewell, OTR/L MMCPTPB [facility]   12/4/2019 10:30 AM Sally Kumar, PTA MMCPTPB [facility]   12/4/2019 11:00 AM Richi Monteiro UOPNMZA [facility]   12/4/2019 12:00 PM Gema Burger SILVERIO TXASYDC [facility]   12/6/2019  1:15 PM Richi Monteiro UQHXTXE [facility]   12/6/2019  2:00 PM SILVERIO Oliveira GEECHWP [facility]   12/9/2019  9:45 AM SILVERIO Oliveira PTJQMVT [facility]   12/9/2019 10:30 AM Ramsey Booth, PT MMCPTPB [facility]   12/9/2019 11:00 AM Richi Monteiro EZCQAAL [facility]   12/11/2019  9:30 AM Sally Kumar, PTA MMCPTPB [facility]   12/11/2019 10:00 AM Susana Jarquin I SLP MMCPTPB [facility]   12/11/2019 11:00 AM Jose F Jewell, OTR/L MMCPTPB [facility]   12/13/2019  8:30 AM Marleny Holbrook MD [address] [facility]   12/13/2019  9:45 AM SILVERIO Oliveira JPRVQNI [facility]   12/13/2019 10:30 AM Jose F Jewell, OTR/L MMCPTPB [facility]   12/16/2019 12:00 PM Ramsey Booth, PT MMCPTPB [facility]   12/16/2019  1:00 PM Richi Monteiro HTJYEGW [facility]   12/16/2019  1:45 PM Gema Burger, SILVERIO OMPNQQI [facility]   12/18/2019  2:00 PM Sally Kumar, PTA MMCPTPB [facility]   12/18/2019  2:45 PM SILVERIO Oliveira DTDHSGS [facility]   12/18/2019  3:30 PM Richi Monteiro IPFHQVY [facility]   12/20/2019  1:00 PM SILVERIO Oliveira [facility]   12/20/2019 2:00 PM Albaro Hamilton ZWVBEDD [facility]   12/23/2019  9:00 AM John Connolly, PTA MMCPTPB [facility]   12/23/2019  9:30 AM Albaro Hamilton HRJYCJU [facility]   12/23/2019 10:30 AM Sarah Smith I, SLP VPGJQNF [facility]   12/24/2019  9:00 AM John Connolly, PTA MMCPTPB [facility]   12/24/2019  9:30 AM Albaro Hamilton CBAJVGE [facility]   12/24/2019 10:15 AM Orville Ríos, SILVERIO XMROBCG [facility]   12/27/2019 10:30 AM Lilian Linder, OTR/L MMCPTPB [facility]   12/27/2019 11:15 AM Ila Thomas, SLP MMCPTPB [facility]   12/30/2019  9:45 AM Orville Ríos, SLP FJYJJZG [facility]   12/30/2019 10:30 AM John Connolly, PTA MMCPTPB [facility]   12/30/2019 11:00 AM Lilian Linder, OTR/L MMCPTPB [facility]   12/31/2019  9:45 AM Lilian Zamanal, OTR/L MMCPTPB [facility]   12/31/2019 10:30 AM John Connolly, PTA MMCPTPB [facility]   12/31/2019 11:30 AM Sarah Smith I, SLP EHLEIID [facility]   1/2/2020  9:45 AM Albaro Hamilton KXXCTLD [facility]   1/2/2020 10:30 AM Orville Ríos, SLP [facility]XRIHX [facility]

## 2019-11-19 NOTE — PROGRESS NOTES
ST DAILY TREATMENT NOTE    Patient Name: Charlene Vaughan  Date:2019  : 1955  [x]  Patient  Verified  Payor: Payor: [place] MEDICARE / Plan: [place] MEDICARE PART A & B / Product Type: Medicare /   In time: 4:47  Out time: 5:30  Total Treatment Time (min): 43  Visit #: 10 of 24     Treatment Diagnosis: Dysarthria and anarthria [R47.1]    SUBJECTIVE  Pain Level (0-10 scale): 0  Any medication changes, allergies to medications, adverse drug reactions, diagnosis change, or new procedure performed?: [x] No    [] Yes (see summary sheet for update)    Subjective functional status/changes:   [x] No changes reported  Pt reports compliance with HEP 2x daily     OBJECTIVE  Treatment provided includes:  Increase/Improve:  []  Voice Quality []  Cognitive Linguistic Skills [x]  Laryngeal/Pharyngeal Exercises   []  Vocal Loudness []  Reading Comprehension [x]  Swallowing Skills    []  Vocal Cord Function []  Auditory Comprehension []  Oral Motor Skills   []  Resonance []  Writing Skills [x]  Compensatory strategies    [x]  Speech Intelligibility []  Expressive Language []  Attention   [x]  Breath Support/Coord.  []  Receptive language []  Memory   [x]  Articulation []  Safety Awareness [x] pt education    []  Fluency []  Word Retrieval []        Treatment Provided:  Dysphagia Treatment:  - oropharyngeal strengthening exercises with skilled training for precision to increased strength/endurance  - po trials of thin liquids with skilled cueing to increase carry over and decrease risk of aspiration with use of compensatory strategies   Dysarthria Treatment:  - diaphragmatic breathing exercise with and without phonation with skilled training and shaping to increase breath support/coordination for speech production - phonatory tasks include vowel prolongations and automatics   - /z/ , /s/ in multi-syllabic words to increase articulatory precision    - /l/ in phrases and sentences     Patient/Caregiver  Education: [x] Review HEP HEP/Handouts given: continue HEP established     Pain Level (0-10 scale) post treatment: 0    ASSESSMENT   Progressing gradually towards STGS addressed   []   Improving appropriately and progressing toward goals  [x]   Improving slowly and progressing toward goals  []   Approximating goals/maximum potential  [x]   Continues to benefit from skilled therapy to address remaining functional deficits  []   Not progressing toward goals and plan of care will be adjusted    Patient will continue to benefit from skilled therapy to address remaining functional deficits: dysphagia     Progress towards goals / Updated goals:  1. The pt will increase quality and length of phonation by sustaining ah utilizing effective diaphragmatic breath support for >10 seconds in 3/3 sessions to increase functional speech intelligibility. 11/19: diaphragmatic breathing in /coordinations with verbal tasks/vowel prolongation duration: longest -12 secs with min-modA saping for easy onsets to decrease vocal strain  2. Pt will demonstrate ability to produce improve prosody, rhyme, rhythm in structured sentences, word emphasis tasks, and varying pitch phrases using various comp strategies with 80% acc given min-mod cues in 3/3opportunities to improve naturalness of speech.   11/19[de-identified] not addressed this date ; prior 11/10/19: stress work at the sentence level with different words in the same sentence underlined:70% acc with min-mod A, SLP modeling the targeted behaviors.   3. The patient will articulate (s/z) consonants at the word level- phrases level  with 90% acc with use of comp strategies and OMES  to improve speech intelligibility to > 90% acc when provided with min cues.   11/19: : /s/ in multi-syllabic words  in the initial position with 85% accuracy with Michael . /z/ initial position with 80% with Michael; medial/final position 90% with Michael  4. The patient will articulate  (l, voiced /th/ consonants at the word level- phrases level  with 90% acc with use of comp strategies and OMES  to improve speech intelligibility to > 90% acc when provided with min cues. 11/19: /l/ in sentences with 78% accuracy with Michael    5. Patient will complete tongue base retraction,and laryngeal/pharyngeal strengthening exercises (including Sheyla maneuver, Mendelsohn maneuver, modified Shaker with CTAR ball, hard /k/ in isolation,  effortful swallow, etc.), with min cues in 5/5 sessions in order to improve the strength/agility/efficiency of his swallow for improved swallowing safety and QOL.    11/19: pt completed high /ee/ x20 with Michael to increase precision, modified shaker with CTAR ball x20 5 sec holds, x5 extended 60 sec holds with Michael       6. Patient will recall/demonstrate aspiration precautions and safe swallowing strategies with 100% acc when provided with occasional cues in 5/5 sessions (small sips/bites; chin tuck with all liquid intake; alternate liquids/solids; slow rate; NO straws, Sit upright at 90 degree angle during PO trials)  to decrease the reported incidences of dysphagia and s/sx of aspiration. 11/19:  Recalled strategies with 100% with min cues. No cues required for chin tuck this date with overall improvement utilizing strategies with po with Michael   7. Patient will tolerate trials of thin liquids (>4 ounces) using compensatory strategies without s/sx of aspiration/penetration when provided with Michael across 5/5 sessions. 11/19:  4oz of water without overt s/sx of aspiration with Michael, however cannot rule out silent aspiration    8. Patient will tolerate regular solids with utilization of compensatory strategies without s/sx of aspiration/penetration or distress with Michael across 5/5 sessions.   11/19: not addressed     PLAN  [x]  Continue plan of care  []  Modify Goals/Treatment Plan      []  Discharge due to:  [] Other:    Simone Ames, SLP 11/19/2019  4:52 PM    Future Appointments   Date Time Provider Marc Ruvalcaba   11/20/2019  9:45 AM Cici Barillas, SILVERIO MMCPTPB [facility]   11/20/2019 10:30 AM Tempie Omar, PTA MMCPTPB [facility]   11/20/2019 11:00 AM Do Lazcano CJMEOWJ [facility]   11/22/2019  3:00 PM Tempie Omar, PTA MMCPTPB [facility]   11/22/2019  3:30 PM Do Lazcano SESWPMI [facility]   11/22/2019  4:15 PM SILVERIO Garcia MMCPTPB [facility]   11/25/2019 12:00 PM Maddy Moseley, PT MMCPTPB [facility]   11/25/2019  1:00 PM Dejan Cord, OTR/L MMCPTPB [facility]   11/25/2019  1:45 PM SILVERIO Emmanuel ELZPEUY [facility]   11/26/2019  1:45 PM SILVERIO Emmanuel RCRZPGL [facility]   11/26/2019  2:45 PM Do Lazcano BPBGNTA [facility]   11/27/2019  3:00 PM Tempie Omar, PTA MMCPTPB [facility]   11/27/2019  4:00 PM Do Lazcano KLEDYYX [facility]   12/2/2019  1:00 PM Tempie Omar, PTA MMCPTPB [facility]   12/2/2019  1:45 PM Cici Barillas, SILVERIO FURCDWN [facility]   12/2/2019  2:30 PM [place] Cord, OTR/L MMCPTPB [facility]   12/4/2019 10:30 AM Tempie Omar, PTA MMCPTPB [facility]   12/4/2019 11:00 AM Do Lazcano KBKPTMD [facility]   12/4/2019 12:00 PM Cici Barillas, SLP YUOOYBV [facility]   12/6/2019  1:15 PM Do Lazcano YSROAHE [facility]   12/6/2019  2:00 PM Cici Barillas, SILVERIO TCEHSUI [facility]   12/9/2019  9:45 AM SILVERIO Emmanuel COQOGII [facility]   12/9/2019 10:30 AM Maddy Moseley, PT MMCPTPB [facility]   12/9/2019 11:00 AM Do Lazcano OVVBAYX [facility]   12/11/2019  9:30 AM Davidroxana Nelson, PTA MMCPTPB [facility]   12/11/2019 10:00 AM Laina Dunlap I SLP MMCPTPB [facility]   12/11/2019 11:00 AM Dejan Myles, OTR/L MMCPTPB [facility]   12/13/2019  8:30 AM Josey Rangel MD [address]   12/13/2019  9:45 AM SILVERIO Emmanuel EBYPSZM [facility]   12/13/2019 10:30 AM Dejan Myles, OTR/L MMCPTPB [facility]   12/16/2019 12:00 PM Maddy Moseley, PT MMCPTPB [facility]   12/16/2019  1:00 PM Do Lazcano IDHUYUB [facility]   12/16/2019  1:45 PM Cici Barillas, SLP RVPKAVU [facility]   12/18/2019  2:00 PM Guillermo Nelson PTA MMCPTPB [facility]   12/18/2019  2:45 PM SILVERIO Hooper IRVCWAL [facility]   12/18/2019  3:30 PM Kristan Nicole Urena YSBTWSR [facility]   12/20/2019  1:00 PM SILVERIO Dickson AWPBYDW [facility]   12/20/2019  2:00 PM Dayton Fonseca XQKAYTD [facility]   12/23/2019  9:00 AM Rina Gunter PTA MMCPTPB [facility]   12/23/2019  9:30 AM Dayton Fonseca NIQIHPM [facility]   12/23/2019 10:30 AM Nely Terrazas I, SILVERIO QBPMXXG [facility]   12/24/2019  9:00 AM Rina Gunter PTA MMCPTPB [facility]   12/24/2019  9:30 AM Dayton Fonseca QBONUIW [facility]   12/24/2019 10:15 AM Nely Terrazas I, SLP ZXWKIRL [facility]   12/27/2019 10:30 AM Rolly Harris, OTR/L MMCPTPB [facility]   12/27/2019 11:15 AM SILVERIO Anthony MMCPTPB [facility]   12/30/2019  9:45 AM Sable Jose Luis, SLP NFVTPIO [facility]   12/30/2019 10:30 AM Rina Gunter PTA MMCPTPB [facility]   12/30/2019 11:00 AM Rolly Pore, OTR/L MMCPTPB [facility]   12/31/2019  9:45 AM Rolly Pore, OTR/L MMCPTPB [facility]   12/31/2019 10:30 AM Rina Gunter PTA MMCPTPB [facility]   12/31/2019 11:30 AM Nely Terrazas I, SLP XIHMNPH [facility]   1/2/2020  9:45 AM Dayton Fonseca RGCTQJO [facility]   1/2/2020 10:30 AM SILVERIO Dickson PXJTNPU [facility]

## 2019-11-19 NOTE — PROGRESS NOTES
PT DAILY TREATMENT NOTE 10-18    Patient Name: Monika Bell  Date:2019  : 1955  [x]  Patient  Verified  Payor: [place] MEDICARE / Plan: [place] MEDICARE PART A & B / Product Type: Medicare /    In time:256  Out time:330  Total Treatment Time (min): 34  Visit #: 9 of 24    Medicare/BCBS Only   Total Timed Codes (min):  34 1:1 Treatment Time:  30       Treatment Area: Lower extremity weakness [R29.898]  Cerebral infarction, unspecified [I63.9]    SUBJECTIVE  Pain Level (0-10 scale): 0/10  Any medication changes, allergies to medications, adverse drug reactions, diagnosis change, or new procedure performed?: [x] No    [] Yes (see summary sheet for update)  Subjective functional status/changes:   [] No changes reported  Pt stated that he is doing pretty good today    OBJECTIVE    34 min Therapeutic Exercise:  [x] See flow sheet :   Rationale: increase ROM and increase strength to improve the patients ability to increase ease with ADLs    With   [x] TE   [] TA   [] neuro   [] other: Patient Education: [x] Review HEP    [] Progressed/Changed HEP based on:   [] positioning   [] body mechanics   [] transfers   [] heat/ice application    [] other:      Other Objective/Functional Measures:   Had no difficulty with exercises  Had no LOB with EC Romberg floor, will add foam next visit with EO  Needed to perform SLR in 3 sets of 5 due to fatigue and poor technique     Pain Level (0-10 scale) post treatment: 0/10    ASSESSMENT/Changes in Function:   Pt is slowly progressing toward goals. Strength is slowly improving in B LE. Static balance is improving.  Pt cont to ambulate with quad cane, but ambulation tolerance is improving    Patient will continue to benefit from skilled PT services to modify and progress therapeutic interventions, address functional mobility deficits, address ROM deficits, address strength deficits, analyze and cue movement patterns, analyze and modify body mechanics/ergonomics, assess and modify postural abnormalities, address imbalance/dizziness and instruct in home and community integration to attain remaining goals. [x]  See Plan of Care  []  See progress note/recertification  []  See Discharge Summary         Progress towards goals / Updated goals:  Short Term Goals: To be accomplished in 1 weeks:               1. Pt will be compliant with a HEP to improve function.   Goal met. 10/23/19     Long Term Goals: To be accomplished in 8 weeks:               1. Pt will ambulate 4 steps x 3 with 1 HR with supervision to be able to navigate stairs at home.                2. Pt will increase FOTO score by 7 pts to improve LE function.                3. Pt will increase right LE hip, quad and HS strength to 4/5 or greater to be able to navigate community distances. Slowly progressing. 11/8/19               4. Pt will demonstrate Romberg EO/EC on a compliant surface to decrease fall risk during ADL's. Progressing. Pt is able to maintain balance on floor in [place] with EO and EC. 11/15/19               5/. Pt will perform sit to stand x 10 to ease with transfers safely. MET.  11/4/19               6. Pt will ambulate with LRAD >150 ft to be able to navigate short community distances.     PLAN  []  Upgrade activities as tolerated     [x]  Continue plan of care  []  Update interventions per flow sheet       []  Discharge due to:_  []  Other:_      Muna Rivas PTA 11/19/2019  2:59 PM    Future Appointments   Date Time Provider Marc Ruvalcaba   11/19/2019  3:00 PM dAy Blackwood PTA MMCPTPB [facility]   11/19/2019  3:30 PM MALLIKA Swan/ROVERTO MMCPTPB [facility]   11/19/2019  4:45 PM SILVERIO Ace MMCPTPB [facility]   11/20/2019  9:45 AM SILVERIO Ace VQLAISK [facility]   11/20/2019 10:30 AM Ady Blackwood PTA MMCPTPB [facility]   11/20/2019 11:00 AM Severa Saba PXQKAHQ [facility]   11/22/2019  3:00 PM Alexandraeric LINDA Blackwood MMCPTPB [facility]   11/22/2019  3:30 PM Severa Saba SDRSUHZ [facility]   11/22/2019 4:15 PM Balbina Narvaez, SLP MMCPTPB [facility]   11/25/2019 12:00 PM Florentino Hanane, PT MMCPTPB [facility]   11/25/2019  1:00 PM Eleno Baltazar, OTR/L MMCPTPB [facility]   11/25/2019  1:45 PM SILVERIO Weaver MMCPTPB [facility]   11/26/2019  1:45 PM SILVERIO Weaver JRLQCQB [facility]   11/26/2019  2:45 PM Nancy Hayden QAMQTNF [facility]   11/27/2019  3:00 PM Clemente Browne, PTA MMCPTPB [facility]   11/27/2019  4:00 PM Nancy Hayden WLOKMAM [facility]   12/2/2019  1:00 PM Clemente Browne, PTA MMCPTPB [facility]   12/2/2019  1:45 PM SILVERIO Weaver XUBGHDW [facility]   12/2/2019  2:30 PM Eleno Baltazar, OTR/L MMCPTPB [facility]   12/4/2019 10:30 AM Clemente Browne, PTA MMCPTPB [facility]   12/4/2019 11:00 AM Nancy Hayden ARIAIDX [facility]   12/4/2019 12:00 PM SILVERIO Weaver UNZMGBW [facility]   12/6/2019  1:15 PM Nancy Hayden CJWPWSY [facility]   12/6/2019  2:00 PM SILVERIO Weaver MMCPTPB [facility]   12/9/2019  9:45 AM SILVERIO Weaver LBMLCEF [facility]   12/9/2019 10:30 AM Florentino Koo, PT MMCPTPB [facility]   12/9/2019 11:00 AM Nancy Hayden KPHAYOH [facility]   12/11/2019  9:30 AM Clemente Machadoto, PTA MMCPTPB [facility]   12/11/2019 10:00 AM Anyi Pham I SLP MMCPTPB [facility]   12/11/2019 11:00 AM Eleno Baltazar, OTR/L MMCPTPB [facility]   12/13/2019  8:30 AM Sandra Robbins MD [address] [facility]   12/13/2019  9:45 AM SILVERIO Weaver JZYQWWY [facility]   12/13/2019 10:30 AM Eleno Baltazar, OTR/L MMCPTPB [facility]   12/16/2019 12:00 PM Florentino Koo, PT MMCPTPB [facility]   12/16/2019  1:00 PM Nancy Hayden RUGZEJY [facility]   12/16/2019  1:45 PM SILVERIO Weaver PAMWVVB [facility]   12/18/2019  2:00 PM Clemente Browne, PTA MMCPTPB [facility]   12/18/2019  2:45 PM Kiki Miller, SLP VMYNVPS [facility]   12/18/2019  3:30 PM Nancy Hayden CLHLRQU [facility]   12/20/2019  1:00 PM Kiki Miller SLP DDGRHWT [facility]   12/20/2019  2:00 PM Nancy Hayden DPDOGKJ [facility]   12/23/2019  9:00 AM Clemente Browne PTA MMCPTPB [facility]   12/23/2019  9:30 AM Nancy Hayden LVJIXNX [facility]   12/23/2019 10:30 AM Ramona España, SILVERIO MMCPTPB [facility]   12/24/2019  9:00 AM Alanna Fishman PTA MMCPTPB [facility]   12/24/2019  9:30 AM Kim Perez QMBFEGS [facility]   12/24/2019 10:15 AM SILVERIO Erickson CLPNKVZ [facility]   12/27/2019 10:30 AM Jose Burciaga, OTR/L EVMJTHQ [facility]   12/27/2019 11:15 AM SILVERIO Singh MMCPTPB [facility]   12/30/2019  9:45 AM SILVERIO Erickson WFDLOKM [facility]   12/30/2019 10:30 AM Alanna Fishman PTA MMCPTPB [facility]   12/30/2019 11:00 AM Jose Burciaga, OTR/L MMCPTPB [facility]   12/31/2019  9:45 AM Jose Burciaga, OTR/L MMCPTPB [facility]   12/31/2019 10:30 AM Alanna Fishman PTA MMCPTPB [facility]   12/31/2019 11:30 AM SILVERIO Erickson TDZKHBG [facility]   1/2/2020  9:45 AM Kim Perez PTXWEPM [facility]   1/2/2020 10:30 AM SILVERIO Erickson QNMIGAB [facility]

## 2019-11-20 ENCOUNTER — HOSPITAL ENCOUNTER (OUTPATIENT)
Dept: PHYSICAL THERAPY | Age: 64
Discharge: HOME | End: 2019-11-20
Payer: MEDICARE

## 2019-11-20 ENCOUNTER — TELEPHONE (OUTPATIENT)
Dept: NEUROLOGY | Age: 64
End: 2019-11-20

## 2019-11-20 DIAGNOSIS — G47.33 OSA (OBSTRUCTIVE SLEEP APNEA): Primary | ICD-10-CM

## 2019-11-20 PROCEDURE — 97112 NEUROMUSCULAR REEDUCATION: CPT

## 2019-11-20 PROCEDURE — 97110 THERAPEUTIC EXERCISES: CPT

## 2019-11-20 PROCEDURE — 92507 TX SP LANG VOICE COMM INDIV: CPT

## 2019-11-20 PROCEDURE — 97032 APPL MODALITY 1+ESTIM EA 15: CPT

## 2019-11-20 PROCEDURE — 92526 ORAL FUNCTION THERAPY: CPT

## 2019-11-20 NOTE — PROGRESS NOTES
[facility] [place] [employer] [facility]  [address] [facility]  [phone] fax    Continued Plan of Care/ Re-certification for Occupational Therapy Services    Patient name: Preston Swift Start of Care: 10/22/19   Referral source: JENNIFER Wilkes NP : 1955   Medical/Treatment Diagnosis: Upper extremity weakness [R29.898]  Cerebral infarction, unspecified [I63.9]  Payor: [place] MEDICARE / Plan: [place] MEDICARE PART A & B / Product Type: Medicare /  Onset Date:19     Prior Hospitalization: see medical history Provider#: [id number]   Medications: Verified on Patient Summary List    Comorbidities: *Depression, HTN, DM, arthritis**  Prior Level of Function:*REtired from [employer], I self care yard care driving, part time parts delivery, carpentry work                                                                            Visits from [facility]: 10    Missed Visits: 0    The Plan of Care and following information is based on the patient's current status:  1.  Patient will be independent in home exercise program for AAROM of right UE to reduce level of assist for self care   Status at Eval/Last Progress Note: not addressed at eval  Status at Last Visit: Met 19     2.  Patient will be able to don right shoe with AFO with supervision.   Status at Eval/Last Progress Note:  Current status:  min assist to fix heel of shoe with shoehoen     3.  Patient will be familiar with task modification to allow him to bathe left side without assist.  Status at Eval/Last Progress Note: not addressed at eval  Current status;: Education on adaptive bathing techniques/strategies,ordered long handled sponge, met 19        Long Term Goals: To be accomplished in  [employer]   1.  Patient will be able to place right arm on table using active motion for use of right hand as assist  Status at Eval/Last Progress Note:  Current status: , met x 5 today 11/18/19     2.  Patient will be Modified independent for all self care tasks. Status at Eval/Last Progress Note: Not adressed  Current status::progressing, adaptive techniques for donning right sneaker given with demo 11/8/19, awaiting long handled sponge, still with min assist to get heel over AFO correctly 11/18/19     3.  Patient will be able to use right hand as stabilizing assist for self care activities. Status at Eval/Last Progress Note: unable   Current status:  Able to hold wash cloth intermittently With Right hand 11/8/19, met with pill bottles 11/18/19     4.  Patient will report improved ability to use right hand to assist with home care as shown by reduced limitation of at least 20% on Neuro quality of Life.   Status at Eval/Last Progress Note: 82% limitation   Current status:Not reassessed     Key functional changes: Patient has begun to be able to activate biceps against gravity through partial ROM following NMES and facilitation techniques. He has improved in self care independence and is now min assist for getting back of shoe adjusted over AFO and has long handled sponge on order for bathing. He responds well to NMES for wrist and digit extension and home unit has been ordered by PCP to further progress. He continues to have significant atrophy of right upper extremity, particularly wrist, forearm and hand muscles.       Problems/ barriers to goal attainment: Atrophy of muscles in right UE, particularly forearm and hand    Treatment Plan: Therapeutic exercise, Therapeutic activities, Neuromuscular re-education, Physical agent/modality, Splinting/orthoses, Patient education and ADLs/IADLs      Patient Goal (s) has been updated and includes: Get my arm moving     Goals for this certification period to be accomplished in 4 weeks:  Continued goals from last note:  *2.  Patient will be Modified independent for all self care tasks**  4.  Patient will report improved ability to use right hand to assist with home care as shown by reduced limitation of at least 20% on Neuro quality of Life  New goals:  5. Patient will be able to actively extend wrist to position hand for grasp of items. 6.  Patient will consistently be able to place hand on table for use as stabilizing assist for self care tasks. Frequency / Duration: Patient to be seen 3 times per week for 4 weeks:    Assessment / Recommendations:Patient has shown excellent progress in self care skills and recruitment of right UE function through use of neurofacilitation and NMES in clinic. Kelly Jacinto He will benefit from continued skilled occupational therapy to improve right UE function for use for self care and home care activities. He will benefit from home NMES unit as ordered by PCP to speed progress through increased carryover of treatment at home. Certification Period: 11/20/19-12/19/19    MALLIKA Gonzalez/L 11/20/2019 8:23 AM    ________________________________________________________________________  I certify that the above Therapy Services are being furnished while the patient is under my care. I agree with the treatment plan and certify that this therapy is necessary.       Physician's Signature:____________Date:_________TIME:________    ** Signature, Date and Time must be completed for valid certification **      Please sign and return to [facility]  [address] [place] [address]            [phone] fax

## 2019-11-20 NOTE — PROGRESS NOTES
OT DAILY TREATMENT NOTE 10-18    Patient Name: Zoran Mawxell  Date:2019  : 1955  [x]  Patient  Verified  Payor: VA MEDICARE / Plan: VA MEDICARE PART A & B / Product Type: Medicare /    In time:1104  Out time:1146  Total Treatment Time (min): 42  Visit #: 11 of 24    Medicare/BCBS Only   Total Timed Codes (min):  27 1:1 Treatment Time:  14     Treatment Area: Upper extremity weakness [R29.898]  Cerebral infarction, unspecified [I63.9]    SUBJECTIVE  Pain Level (0-10 scale): 0/10  Any medication changes, allergies to medications, adverse drug reactions, diagnosis change, or new procedure performed?: [x] No    [] Yes (see summary sheet for update)  Subjective functional status/changes:   [] No changes reported  I hope they don't cut me off before my hand really gets going    OBJECTIVE  Modality rationale: increase muscle contraction/control to improve the patients ability to ability to move wrist   Min Type Additional Details    [] Estim:  []Unatt       []IFC  []Premod                        []Other:  []w/ice   []w/heat  Position:  Location:   15 [] Estim: [x]Att    []TENS instruct  [x]NMES                    []Other:  []w/US   []w/ice   []w/heat  Position:  Location: Right Wrist/digit extensors     []  Traction: [] Cervical       []Lumbar                       [] Prone          []Supine                       []Intermittent   []Continuous Lbs:  [] before manual  [] after manual    []  Ultrasound: []Continuous   [] Pulsed                           []1MHz   []3MHz Location:  W/cm2:    []  Iontophoresis with dexamethasone         Location: [] Take home patch   [] In clinic    []  Ice     []  heat  []  Ice massage  []  Laser   []  Anodyne Position:  Location:    []  Laser with stim  []  Other: Position:  Location:    []  Vasopneumatic Device Pressure:       [] lo [] med [] hi   Temperature: [] lo [] med [] hi   [x] Skin assessment post-treatment:  [x]intact []redness- no adverse reaction  []redness - adverse reaction:     27 min Neuromuscular Re-education:  []  See flow sheet :   Rationale: increase ROM and increase strength  to improve the patients ability to move right arm     Supine : Shoulder flexion with elbow extension:  Place and hold with  facilitation     Seated elbow flexion/ext on raised table  Seated Floor reach   Retraction/elevation       With   [] TE   [] TA   [] neuro   [] other: Patient Education: [x] Review HEP    [] Progressed/Changed HEP based on:   [] positioning   [] body mechanics   [] transfers   [] heat/ice application   [] Splint wear/care   [] Sensory re-education   [] scar management      [] other:            Other Objective/Functional Measures:     Difficulty with place and hold on this date, possible due to fatigue  Ongoing cueing required to breathe when performing exercises      Pain Level (0-10 scale) post treatment: 0/10    ASSESSMENT/Changes in Function: volitional Elbow movement improving     Patient will continue to benefit from skilled OT services to modify and progress therapeutic interventions, address ROM deficits, address strength deficits and instruct in home and community integration to attain remaining goals. []  See Plan of Care  []  See progress note/recertification  []  See Discharge Summary         Progress towards goals / Updated goals:  1.  Patient will be independent in home exercise program for AAROM of right UE to reduce level of assist for self care   Status at Eval/Last Progress Note: not addressed at eval  Status at Last Visit: Met 11/8/19     2.  Patient will be able to don right shoe with AFO with supervision.   Status at Eval/Last Progress Note:  Current status:  min assist to fix heel of shoe with shoehoen     3.  Patient will be familiar with task modification to allow him to bathe left side without assist.  Status at Eval/Last Progress Note: not addressed at eval  Current status;: Education on adaptive bathing techniques/strategies,ordered long handled sponge, met 11/18/19        Long Term Goals: To be accomplished in 8 weeks:              1.  Patient will be able to place right arm on table using active motion for use of right hand as assist  Status at Eval/Last Progress Note:  Current status: , met x 5 today 11/18/19     2.  Patient will be Modified independent for all self care tasks. Status at Eval/Last Progress Note: Not adressed  Current status::progressing, adaptive techniques for donning right sneaker given with demo 11/8/19, awaiting long handled sponge, still with min assist to get heel over AFO correctly 11/18/19     3.  Patient will be able to use right hand as stabilizing assist for self care activities.   Status at Eval/Last Progress Note: unable   Current status:  Able to hold wash cloth intermittently With Right hand 11/8/19, met with pill bottles 11/18/19     4.  Patient will report improved ability to use right hand to assist with home care as shown by reduced limitation of at least 20% on Neuro quality of Life.   Status at Eval/Last Progress Note: 82% limitation   Current status:Not reassessed    PLAN  []  Upgrade activities as tolerated     []  Continue plan of care  []  Update interventions per flow sheet       []  Discharge due to:_  []  Other:_      DARELL Henao 11/20/2019  9:39 AM    Future Appointments   Date Time Provider Marc Ruvalcaba   11/20/2019  9:45 AM SILVERIO Rivera MMCPTPB [facility]   11/20/2019 10:30 AM Radu Anne PTA IEYPCKA [facility]   11/20/2019 11:00 AM [facility] EGPIOOL [facility]   11/22/2019  3:00 PM Radu Anne PTA MMCPTPB [facility]   11/22/2019  3:30 PM [facility] DEWAYNE [facility]   11/22/2019  4:15 PM SILVERIO Sanchez MMCPTPB [facility]   11/25/2019 12:00 PM Ishan Vasquez, PT MMCPTPB [facility]   11/25/2019  1:00 PM Hafsa Newton, OTR/L MMCPTPB [facility]   11/25/2019  1:45 PM SILVERIO Chinchilla MMCPTPB [facility]   11/26/2019  1:45 PM SILVERIO Chinchilla MMCPTPB [facility]   11/26/2019  2:45 PM Kristan Benito CHAMPAGNE [facility]   11/27/2019  3:00 PM John Connolly, PTA MMCPTPB [facility]   11/27/2019  4:00 PM Albaro Hamilton PYRWENP [facility]   12/2/2019  1:00 PM John Connolly, PTA MMCPTPB [facility]   12/2/2019  1:45 PM Orville Ríos, SILVERIO BXDFVBC [facility]   12/2/2019  2:30 PM Lilian Linder, OTR/L MMCPTPB [facility]   12/4/2019 10:30 AM John Connolly, PTA MMCPTPB [facility]   12/4/2019 11:00 AM Albaro Hamilton VBMLPTA [facility]   12/4/2019 12:00 PM Orville Ríos, SLP VYSPFEE [facility]   12/6/2019  1:15 PM Albaro Hamilton MAUDUMX [facility]   12/6/2019  2:00 PM Orville Ríos, SLP MMCPTPB [facility]   12/9/2019  9:45 AM Orville Ríos, SILVERIO MFYHVDU [facility]   12/9/2019 10:30 AM Jamshid Madrid, PT MMCPTPB [facility]   12/9/2019 11:00 AM Albaro Hamilton HKYQENS [facility]   12/11/2019  9:30 AM John Connolly, PTA MMCPTPB [facility]   12/11/2019 10:00 AM Sarah Smith I, SLP MMCPTPB [facility]   12/11/2019 11:00 AM Lilian Linder, OTR/L MMCPTPB [facility]   12/13/2019  8:30 AM Annalee Layne MD [address]   12/13/2019  9:45 AM Orville Ríos, SLP YYVNONS [facility]   12/13/2019 10:30 AM Lilian Linder, OTR/L MMCPTPB [facility]   12/16/2019 12:00 PM Jamshid Madrid, PT MMCPTPB [facility]   12/16/2019  1:00 PM Albaro Hamilton VFFDPOP [facility]   12/16/2019  1:45 PM Orville Ríos, SILVERIO GSCLFHD [facility]   12/18/2019  2:00 PM John Connolly PTA MMCPTPB [facility]   12/18/2019  2:45 PM SILVERIO Ambrocio DOXBIWD [facility]   12/18/2019  3:30 PM Albaro Hamilton DPILCGF [facility]   12/20/2019  1:00 PM Orville Ríos, SILVERIO NTJBTRE [facility]   12/20/2019  2:00 PM Albaro Hamilton LSXROEN [facility]   12/23/2019  9:00 AM John Connolly PTA MMCPTPB [facility]   12/23/2019  9:30 AM Albaro Hamilton YLMTDCC [facility]   12/23/2019 10:30 AM SILVERIO Ambrocio GLYMHKL [facility]   12/24/2019  9:00 AM John Connolly PTA MMCPTPB [facility]   12/24/2019  9:30 AM Albaro Hamilton TMDGIVX [facility]   12/24/2019 10:15 AM SILVERIO Robison KBTPERE [facility]   12/27/2019 10:30 AM MALLIKA Hurst/L KDLXLSS [facility]   12/27/2019 11:15 AM Ila Thomas, SLP MMCPTPB [facility]   12/30/2019  9:45 AM SILVERIO Esqueda IXKXSAE [facility]   12/30/2019 10:30 AM Linda Joel, PTA MMCPTPB [facility]   12/30/2019 11:00 AM Sen Contreras, OTR/L MMCPTPB [facility]   12/31/2019  9:45 AM Sen Contreras, OTR/L MMCPTPB [facility]   12/31/2019 10:30 AM Linda Joel, PTA MMCPTPB [facility]   12/31/2019 11:30 AM SILVERIO Fernandez FTJADDD [facility]   1/2/2020  9:45 AM Jasmin Choudhary DMHDHKV [facility]   1/2/2020 10:30 AM SILVERIO Esqueda GIANOQW [facility]

## 2019-11-20 NOTE — PROGRESS NOTES
[facility] - [facility]  [address] [facility]  [phone] fax    Continued Plan of Care/ Re-certification for Speech Therapy Services    Patient name: Pati Anand Start of Care: 10/23/2019   Referral source: Arlene Almonte NP : 1955               Medical Diagnosis: Cerebral infarction, unspecified [I63.9]  [place]or: [place] MED[place]ARE / Plan: [place] MEDICARE PART A & B / Product Type: Medicare /  Onset Date:2019               Treatment Diagnosis: R13.12 Oropharyngeal dysphagia   Prior Hospitalization: see medical history Provider#: [id number]   Medications: Verified on Patient summary List    Comorbidities: Acute Ischemic Stroke (acute/early subacute infarct at the L posterior basal ganglia to corona radiata with residual R hemiparesis), Dysphagia, Dysarthria, Diabetes, Obstructive Sleep Apnea on CPAP, HBP, Kidney Disease,  Prior Level of Function: Speech-language, swallowing, cognition, and voice WNL; regular diet with thin liquids     Visits from Start of Care: 11    Missed Visits: 0    Reporting Period: 10/23/19 to 19    Subjective Reports: Pt reports compliance with HEP 2x daily ; 1x daily on days he receives therapy     Goal: 1. The pt will increase quality and length of phonation by sustaining ah utilizing effective diaphragmatic breath support for >10 seconds in 3/3 sessions to increase functional speech intelligibility.   Status at last note/certification: established at initial evaluation   Current Status: Not met: progressing towards goal: diaphragmatic breathing in /coordinations with verbal tasks/vowel prolongation duration: range: ~7-11 secs with modA shaping for easy onsets to decrease vocal strain  Goal: 2. Pt will demonstrate ability to produce improve prosody, rhyme, rhythm in structured sentences, word emphasis tasks, and varying pitch phrases using various comp strategies with 80% acc given min-mod cues in 3/3opportunities to improve naturalness of speech.   Status at last note/certification: established at initial evaluation   Current Status: Not met: stress word at the sentence level with different words in the same sentence underlined: 75% acc with mod A for shaping/modeling  Goal: 3. The patient will articulate (s/z) consonants at the word level- phrases level  with 90% acc with use of comp strategies and OMES  to improve speech intelligibility to > 90% acc when provided with min cues. Status at last note/certification: established at initial evaluation   Current Status: Not met: progressing towards goal: /s/ in multi-syllabic words  in the initial position with 85% accuracy with Michael . /z/ initial position with 80% with Michael; medial/final position 90% with Michael  Goal: 4. The patient will articulate  (l, voiced /th/ consonants at the word level- phrases level  with 90% acc with use of comp strategies and OMES  to improve speech intelligibility to > 90% acc when provided with min cues. Status at last note/certification: established at initial evaluation   Current Status: Not met: progressing towards goal: /l/ in sentences with 78% accuracy with Michael, voice /th/ in phrases 88-90% accuracy    Goal: 5. Patient will complete tongue base retraction,and laryngeal/pharyngeal strengthening exercises (including Sheyla maneuver, Mendelsohn maneuver, modified Shaker with CTAR ball, hard /k/ in isolation,  effortful swallow, etc.), with min cues in 5/5 sessions in order to improve the strength/agility/efficiency of his swallow for improved swallowing safety and QOL.    Status at last note/certification: established at dysphagia evaluation   Current Status:  Not met: progressing towards goal:  pt completes high /ee/ x20 with min to modA to increase precision, mendelsohn maneuver 2-3 sec holds x15 with modA,, hard /k/ x56 Michael; modified shaker with CTAR ball x20 5 sec holds, x5 extended 60 sec holds with Michael    Goal: 6.  Patient will recall/demonstrate aspiration precautions and safe swallowing strategies with 100% acc when provided with occasional cues in 5/5 sessions (small sips/bites; chin tuck with all liquid intake; alternate liquids/solids; slow rate; NO straws, Sit upright at 90 degree angle during PO trials)  to decrease the reported incidences of dysphagia and s/sx of aspiration. Status at last note/certification:  established at dysphagia evaluation   Current Status: Not met: progressing towards goal: Recalled strategies with 100% with min-mod cues. No cues required for chin tuck this date with continued improvement utilizing strategies with po with Michael    Goal: 7. Patient will tolerate trials of thin liquids (>4 ounces) using compensatory strategies without s/sx of aspiration/penetration when provided with Michael across 5/5 sessions. Status at last note/certification: established at dysphagia evaluation   Current Status:  Not met: progressing towards goal: x1 throat clear post swallow likely d/t pt sloshing water around mouth prior to swallow decreasing bolus control - pt education provided for decreased bolus control. Pt tolerates ~3oz of additional trials of water without overt s/sx of aspiration with Michael, however cannot rule out silent aspiration  - continued improvement taking small sips at a slower rate and decreased cueing for chin tuck required    Goal: 8. Patient will tolerate regular solids with utilization of compensatory strategies without s/sx of aspiration/penetration or distress with Michael across 5/5 sessions. Status at last note/certification:  established at dysphagia evaluation   Current Status:  Not met: progressing towards goal: tolerates 5/5 trials of regular solids utilizing compensatory strategies with min cues - mild pocketing and oral status post swallow - pt utilized lingual sweep to clear pocketing in R buccal area  x2 of 3 sessions met.       Key functional changes:   Pt is progressing gradually in treatment for dysarthria and dysphagia. He is progressing towards all current STGs and making functional gains in speech intelligibility and efficiency of oropharyngeal swallow. Pt is emerging in improved breath support/coordinaiton for speech production. Gradually progressing in articulatory precision of target phonemes to improve speech intelligibility. He is making gains in accuracy and precision of oropharyngeal strengthening exercises with increased level of independence. Pt had made progress in independent use of compensatory techniques to improve safety of swallow and decrease risk of aspiration. Pt is progressing towards all STGs. Recommend continuing to address all current STGs. Problems/ barriers to goal attainment: none       Problem List:    []aphasic  [x]dysarthric  [x]dysphagic       []alexic  []agraphic  []dysphonia       []dysfluency   []Cognitive-Linguistic Disorder       []other        Treatment Plan: Dysarthria Treatment, Dysphagia Treatment, Treatment of Swallowing and Patient Education    Patient Goal (s) has been updated and includes: continue all STGs      Updated Goals to be accomplished in 6 weeks:  1. The pt will increase quality and length of phonation by sustaining ah utilizing effective diaphragmatic breath support for >10 seconds in 3/3 sessions to increase functional speech intelligibility. 2. Pt will demonstrate ability to produce improve prosody, rhyme, rhythm in structured sentences, word emphasis tasks, and varying pitch phrases using various comp strategies with 80% acc given min-mod cues in 3/3opportunities to improve naturalness of speech. 3. The patient will articulate  (s/z) consonants at the word level- phrases level  with 90% acc with use of comp strategies and OMES  to improve speech intelligibility to > 90% acc when provided with min cues.   4. The patient will articulate  (l, voiced /th/ consonants at the word level- phrases level  with 90% acc with use of comp strategies and OMES  to improve speech intelligibility to > 90% acc when provided with min cues. 5. Patient will complete tongue base retraction,and laryngeal/pharyngeal strengthening exercises (including Sheyla maneuver, Mendelsohn maneuver, modified Shaker with CTAR ball, hard /k/ in isolation,  effortful swallow, etc.), with min cues in 5/5 sessions in order to improve the strength/agility/efficiency of his swallow for improved swallowing safety and QOL. 6. Patient will recall/demonstrate aspiration precautions and safe swallowing strategies with 100% acc when provided with occasional cues in 5/5 sessions (small sips/bites; chin tuck with all liquid intake; alternate liquids/solids; slow rate; NO straws, Sit upright at 90 degree angle during PO trials)  to decrease the reported incidences of dysphagia and s/sx of aspiration. 7. Patient will tolerate trials of thin liquids (>4 ounces) using compensatory strategies without s/sx of aspiration/penetration when provided with Michael across 5/5 sessions. 8. Patient will tolerate regular solids with utilization of compensatory strategies without s/sx of aspiration/penetration or distress with Michael across 5/5 sessions.       Frequency / Duration: Patient to be seen 2-3 times per week for 8 weeks:    Assessment / Recommendations:   is progressing in treatment for dysphagia and dysarthria. It is recommended he continue to receive skilled speech therapy services as it is medically necessary to improve speech intelligibility, socialization, safety and efficiency of oropharyngeal swallow, and overall QOL. Certification Period: 11/20/19-1/17/19    SILVERIO Powell 11/20/2019 10:39 AM  MS CCC-SLP  Speech-Language Pathologist       _____________________________________________________________________    I certify that the above Therapy Services are being furnished while the patient is under my care.  I agree with the treatment plan and certify that this therapy is necessary. []  I have read the above report and request that my patient continue as recommended. []  I have read the above report and request that my patient continue therapy with the following changes/special instructions:________________________________________  []I have read the above report and request that my patient be discharged from therapy.     Physician's Signature:____________Date:_________TIME:________    ** Signature, Date and Time must be completed for valid certification **      Please sign and return to [facility] - [facility]   [address] [place] [address]  [phone] fax

## 2019-11-20 NOTE — PROGRESS NOTES
PT DAILY TREATMENT NOTE 10-18    Patient Name: Evie Ball  Date:2019  : 1955  [x]  Patient  Verified  Payor: [place] MEDICARE / Plan: [place] MEDICARE PART A & B / Product Type: Medicare /    In time:1028  Out time:1100  Total Treatment Time (min): 32  Visit #: 10 of 24    Medicare/BCBS Only   Total Timed Codes (min):  32 1:1 Treatment Time:  32       Treatment Area: Lower extremity weakness [R29.898]  Cerebral infarction, unspecified [I63.9]    SUBJECTIVE  Pain Level (0-10 scale): 0/10  Any medication changes, allergies to medications, adverse drug reactions, diagnosis change, or new procedure performed?: [x] No    [] Yes (see summary sheet for update)  Subjective functional status/changes:   [] No changes reported  Pt stated that he is doing well today    OBJECTIVE    32 min Therapeutic Exercise:  [x] See flow sheet :   Rationale: increase ROM and increase strength to improve the patients ability to increase ease with ADLs    With   [x] TE   [] TA   [] neuro   [] other: Patient Education: [x] Review HEP    [] Progressed/Changed HEP based on:   [] positioning   [] body mechanics   [] transfers   [] heat/ice application    [] other:      Other Objective/Functional Measures:   Had no difficulty with addition of 1# weights with hip x 3  Was able to flex hip well with marches  Pt was fatigued at end of session due to having therapy yesterday     Pain Level (0-10 scale) post treatment: 0/10    ASSESSMENT/Changes in Function:   Pt is slowly progressing toward goals. Strength in B LE is slowly improving. Static balance is improving.  Pt cont to ambulate a slow pace and uses a SBQC    Patient will continue to benefit from skilled PT services to modify and progress therapeutic interventions, address functional mobility deficits, address ROM deficits, address strength deficits, analyze and cue movement patterns, analyze and modify body mechanics/ergonomics, assess and modify postural abnormalities, address imbalance/dizziness and instruct in home and community integration to attain remaining goals. [x]  See Plan of Care  []  See progress note/recertification  []  See Discharge Summary         Progress towards goals / Updated goals:  Short Term Goals: To be accomplished in 1 weeks:               1. Pt will be compliant with a HEP to improve function.   Goal met. 10/23/19     Long Term Goals: To be accomplished in 8 weeks:               1. Pt will ambulate 4 steps x 3 with 1 HR with supervision to be able to navigate stairs at home.                2. Pt will increase FOTO score by 7 pts to improve LE function.                3. Pt will increase right LE hip, quad and HS strength to 4/5 or greater to be able to navigate community distances. Slowly progressing. 11/8/19               4. Pt will demonstrate Romberg EO/EC on a compliant surface to decrease fall risk during ADL's. Progressing. Pt is able to maintain balance on floor in Romberg with EO and EC. 11/15/19               5/. Pt will perform sit to stand x 10 to ease with transfers safely. MET.  11/4/19               6. Pt will ambulate with LRAD >150 ft to be able to navigate short community distances.     PLAN  []  Upgrade activities as tolerated     [x]  Continue plan of care  []  Update interventions per flow sheet       []  Discharge due to:_  []  Other:_      Erik Okeefe PTA 11/20/2019  10:35 AM    Future Appointments   Date Time Provider Marc Ruvalcaba   11/20/2019 11:00 AM Dai Savage HVCLTMF [facility]   11/22/2019  3:00 PM Wellington Moreno, PTA MMCPTPB [facility]   11/22/2019  3:30 PM Dai Savage RLJZIFV [facility]   11/22/2019  4:15 PM SILVERIO Zaragoza MMCPTPB [facility]   11/25/2019 12:00 PM Iliana Cesar PT MMCPTPB [facility]   11/25/2019  1:00 PM MALLIKA Marion/L MMCPTPB [facility]   11/25/2019  1:45 PM SILVERIO Morrison MMCPTPB [facility]   11/26/2019  1:45 PM SILVERIO Morrison MMCPTPB [facility]   11/26/2019  2:45 PM Dai Savage QEYJVXP [facility] 11/27/2019  3:00 PM Pricila Gottlieb, PTA MMCPTPB [facility]   11/27/2019  4:00 PM Solitario Lakes EQEOEZF [facility]   12/2/2019  1:00 PM Pricila Gottlieb, PTA MMCPTPB [facility]   12/2/2019  1:45 PM Crotez Louis, SLP VZUQTML [facility]   12/2/2019  2:30 PM Claudetta Hickory, OTR/L MMCPTPB [facility]   12/4/2019 10:30 AM Pricila Gottlieb, PTA MMCPTPB [facility]   12/4/2019 11:00 AM Solitario Lakes MYNKIQY [facility]   12/4/2019 12:00 PM SILVERIO Garza BAILEDJ [facility]   12/6/2019  1:15 PM Solitario Kari RDOQZEE [facility]   12/6/2019  2:00 PM Anastasia Carrillo I SLP MMCPTPB [facility]   12/9/2019  9:45 AM Cortez Louis SLP PZIHLEV [facility]   12/9/2019 10:30 AM Elena Baltazar, PT MMCPTPB [facility]   12/9/2019 11:00 AM Solitario Pierce MCIHTUV [facility]   12/11/2019  9:30 AM Pricila Gottlieb, PTA MMCPTPB [facility]   12/11/2019 10:00 AM Anastasia Carrillo I, SLP FVVECQN [facility]   12/11/2019 11:00 AM Claudetta Hickory, OTR/L MMCPTPB [facility]   12/13/2019  8:30 AM Néstor Worley MD [address]   12/13/2019  9:45 AM Cortez Louis, SILVERIO MMCPTPB [facility]   12/13/2019 10:30 AM Claudetta Hickory, OTR/L MMCPTPB [facility]   12/16/2019 12:00 PM Elena Giovanny, PT MMCPTPB [facility]   12/16/2019  1:00 PM Solitario Kari ZHNHMVS [facility]   12/16/2019  1:45 PM Cortez Louis, SILVERIO FYGQVAQ [facility]   12/18/2019  2:00 PM Pricila Gottlieb PTA MMCPTPB [facility]   12/18/2019  2:45 PM SILVERIO Garza EVZDGBB [facility]   12/18/2019  3:30 PM Solitario Kari DJUJOVX [facility]   12/20/2019  1:00 PM SILVERIO Garza ENXXNBR [facility]   12/20/2019  2:00 PM Solitario Kari HONCYSB [facility]   12/23/2019  9:00 AM Pricila Gottlieb PTA MMCPTPB [facility]   12/23/2019  9:30 AM Solitario Pierce CCCEIJM [facility]   12/23/2019 10:30 AM Anastasia Carrillo I SLP EDOLGAQ [facility]   12/24/2019  9:00 AM Pricila Gottlieb PTA MMCPTPB [facility]   12/24/2019  9:30 AM Solitario Pierce CPJTOEZ [facility]   12/24/2019 10:15 AM Cortez Louis SLP JQLARFC [facility]   12/27/2019 10:30 AM Claudetta Hickory, OTR/L MMCPTPB [facility]   12/27/2019 11:15 AM SILVERIO Soliz MMCPTPB [facility] 12/30/2019  9:45 AM Gregory Mendoza I, SLP JWBYTPT [facility]   12/30/2019 10:30 AM Sandra Billy, PTA MMCPTPB [facility]   12/30/2019 11:00 AM Cherl Jose, OTR/L MMCPTPB [facility]   12/31/2019  9:45 AM Cherl Jose, OTR/L MMCPTPB [facility]   12/31/2019 10:30 AM Sandra Billy, PTA MMCPTPB [facility]   12/31/2019 11:30 AM Gregory Mendoza I, SLP RQVILFO [facility]   1/2/2020  9:45 AM Benedicto Jose GALCWZC [facility]   1/2/2020 10:30 AM Denita Mackay, SLP VUSYRFA [facility]

## 2019-11-20 NOTE — PROGRESS NOTES
ST DAILY TREATMENT NOTE    Patient Name: Berna Merchant  Date:2019  : 1955  [x]  Patient  Verified  Payor: Payor: [place] MEDICARE / Plan: [place] MEDICARE PART A & B / Product Type: Medicare /   In time: 9:48  Out time: 10;30  Total Treatment Time (min): 42  Visit #: 11 of 24    Treatment Diagnosis: Dysarthria and anarthria [R47.1] oropharyngeal dysphagia [R13.12]    SUBJECTIVE  Pain Level (0-10 scale): 0  Any medication changes, allergies to medications, adverse drug reactions, diagnosis change, or new procedure performed?: [x] No    [] Yes (see summary sheet for update)    Subjective functional status/changes:   [x] No changes reported      OBJECTIVE  Treatment provided includes:  Increase/Improve:  []  Voice Quality []  Cognitive Linguistic Skills [x]  Laryngeal/Pharyngeal Exercises   []  Vocal Loudness []  Reading Comprehension [x]  Swallowing Skills    []  Vocal Cord Function []  Auditory Comprehension []  Oral Motor Skills   []  Resonance []  Writing Skills [x]  Compensatory strategies    [x]  Speech Intelligibility []  Expressive Language []  Attention   [x]  Breath Support/Coord.  []  Receptive language []  Memory   []  Articulation []  Safety Awareness []    []  Fluency []  Word Retrieval []        Treatment Provided:  Dysphagia Treatment:  - oropharyngeal strengthening exercises with skilled training for precision to increased strength/endurance  - po trials of thin liquids and solids with skilled cueing to increase carry over and decrease risk of aspiration with use of compensatory strategies   Dysarthria Treatment:  - diaphragmatic breathing exercise with and without phonation with skilled training and shaping to increase breath support/coordination for speech production - phonatory tasks include vowel  - training prosody in sentences and increase in vocal loudness     Patient/Caregiver  Education: [x] Review HEP      HEP/Handouts given: continue HEP established     Pain Level (0-10 scale) post treatment: 0    ASSESSMENT   See progress report   []   Improving appropriately and progressing toward goals  [x]   Improving slowly and progressing toward goals  []   Approximating goals/maximum potential  [x]   Continues to benefit from skilled therapy to address remaining functional deficits  []   Not progressing toward goals and plan of care will be adjusted    Patient will continue to benefit from skilled therapy to address remaining functional deficits: dysphagia , dysarthria     Progress towards goals / Updated goals:  1. The pt will increase quality and length of phonation by sustaining ah utilizing effective diaphragmatic breath support for >10 seconds in 3/3 sessions to increase functional speech intelligibility. 11/20: diaphragmatic breathing in /coordinations with verbal tasks/vowel prolongation duration: longest -10.5 secs with min-modA shaping for easy onsets to decrease vocal strain  2. Pt will demonstrate ability to produce improve prosody, rhyme, rhythm in structured sentences, word emphasis tasks, and varying pitch phrases using various comp strategies with 80% acc given min-mod cues in 3/3opportunities to improve naturalness of speech.   11/20: stress work at the sentence level with different words in the same sentence underlined: 75% acc with mod A for shaping/modeling  3. The patient will articulate (s/z) consonants at the word level- phrases level  with 90% acc with use of comp strategies and OMES  to improve speech intelligibility to > 90% acc when provided with min cues. 11/19: : /s/ in multi-syllabic words  in the initial position with 85% accuracy with Michael . /z/ initial position with 80% with Michael; medial/final position 90% with Michael  4. The patient will articulate  (l, voiced /th/ consonants at the word level- phrases level  with 90% acc with use of comp strategies and OMES  to improve speech intelligibility to > 90% acc when provided with min cues.   11/20:  Not addressed this date ; prior : 11/19: /l/ in sentences with 78% accuracy with Michael    5. Patient will complete tongue base retraction,and laryngeal/pharyngeal strengthening exercises (including Sheyla maneuver, Mendelsohn maneuver, modified Shaker with CTAR ball, hard /k/ in isolation,  effortful swallow, etc.), with min cues in 5/5 sessions in order to improve the strength/agility/efficiency of his swallow for improved swallowing safety and QOL.    11/20: hard /k/ x56 ; modified shaker with CTAR ball x25 5 sec holds, x5 extended 60 sec holds with Michael       6. Patient will recall/demonstrate aspiration precautions and safe swallowing strategies with 100% acc when provided with occasional cues in 5/5 sessions (small sips/bites; chin tuck with all liquid intake; alternate liquids/solids; slow rate; NO straws, Sit upright at 90 degree angle during PO trials)  to decrease the reported incidences of dysphagia and s/sx of aspiration. 11/20:  Recalled strategies with 100% with min-mod cues. No cues required for chin tuck this date with continued improvement utilizing strategies with po with Michael    7. Patient will tolerate trials of thin liquids (>4 ounces) using compensatory strategies without s/sx of aspiration/penetration when provided with Michael across 5/5 sessions. 11/20:  x1 throat clear post swallow likely d/t pt \"swishing\" water around mouth - pt education provided for decreased bolus control. Pt tolerates ~3oz of additional trials of water without overt s/sx of aspiration with Michael, however cannot rule out silent aspiration  - continued improvement taking small sips at a slower rate - no cues required for chin tuck today   8. Patient will tolerate regular solids with utilization of compensatory strategies without s/sx of aspiration/penetration or distress with Michael across 5/5 sessions.   11/20: tolerates 5/5 trials of regular solids utilizing compensatory strategies with min cues - mild pocketing and oral status post swallow - pt utilized lingual sweep to clear pocketing in R buccal area        PLAN  [x]  Continue plan of care  []  Modify Goals/Treatment Plan      []  Discharge due to:  [] Other:    SILVERIO Cali 11/20/2019  9:52 AM    Future Appointments   Date Time Provider Marc Ruvalcaba   11/20/2019 10:30 AM Linda Joel, PTA MMCPTPB [facility]   11/20/2019 11:00 AM Jasmin Crew PPGPOFB [facility]   11/22/2019  3:00 PM Linda Joel, PTA MMCPTPB [facility]   11/22/2019  3:30 PM Jasmin Crew DTEGOUZ [facility]   11/22/2019  4:15 PM SILVERIO Bourgeois MMCPTPB [facility]   11/25/2019 12:00 PM Peg Burgos, PT MMCPTPB [facility]   11/25/2019  1:00 PM Sen Contreras, OTR/L MMCPTPB [facility]   11/25/2019  1:45 PM SILVERIO Esqueda HOIYIQU [facility]   11/26/2019  1:45 PM SILVERIO Esqueda XHWSALJ [facility]   11/26/2019  2:45 PM Jasmin Crew XCNRPKH [facility]   11/27/2019  3:00 PM Linda Joel, PTA MMCPTPB [facility]   11/27/2019  4:00 PM Jasmin Crew MWLRCIH [facility]   12/2/2019  1:00 PM Linda Joel, PTA MMCPTPB [facility]   12/2/2019  1:45 PM SILVERIO Esqueda MJZNNLX [facility]   12/2/2019  2:30 PM Sen Contreras, OTR/L MMCPTPB [facility]   12/4/2019 10:30 AM Linda Joel, PTA MMCPTPB [facility]   12/4/2019 11:00 AM Jasmin Creles NEVUPLJ [facility]   12/4/2019 12:00 PM SILVERIO Esqueda YXDPWLV [facility]   12/6/2019  1:15 PM Jasmin Creles THTALMV [facility]   12/6/2019  2:00 PM SILVERIO Esqueda QUBUYID [facility]   12/9/2019  9:45 AM SILVERIO Esqueda NCGNSGO [facility]   12/9/2019 10:30 AM Peg Burgos PT MMCPTPB [facility]   12/9/2019 11:00 AM Jasmin Funmi KLFKKYE [facility]   12/11/2019  9:30 AM Linda Joel, PTA MMCPTPB [facility]   12/11/2019 10:00 AM SILVERIO Fernandez ISGVIMN [facility]   12/11/2019 11:00 AM Sen Contreras OTR/L MMCPTPB [facility]   12/13/2019  8:30 AM Christina Welch MD [address]   12/13/2019  9:45 AM SILVERIO Esqueda POUYLWH [facility]   12/13/2019 10:30 AM Sen Contreras OTR/L MMCPTPB [facility]   12/16/2019 12:00 PM Peg Burgos PT MMCPTPB [facility]   12/16/2019 1:00 PM Albaro Hamilton UTQMJJG [facility]   12/16/2019  1:45 PM Orville Ríos, SILVERIO DRDSCAN [facility]   12/18/2019  2:00 PM John Connolly PTA MMCPTPB [facility]   12/18/2019  2:45 PM Orville Ríos, SLP MLIYPTO [facility]   12/18/2019  3:30 PM Albaro Hamilton TZRKJWS [facility]   12/20/2019  1:00 PM Orville Ríos, SLP KSSIPSV [facility]   12/20/2019  2:00 PM Albaro Hamilton NOTJDZM [facility]   12/23/2019  9:00 AM John Connolly PTA MMCPTPB [facility]   12/23/2019  9:30 AM Albaro Hamilton VFPESTB [facility]   12/23/2019 10:30 AM SILVERIO Ambrocio KBNPHQW [facility]   12/24/2019  9:00 AM John Connolly, PTA MMCPTPB [facility]   12/24/2019  9:30 AM Albaro Hamilton UDJPJOG [facility]   12/24/2019 10:15 AM Sarah Smith I, SLP KLGFFSK [facility]   12/27/2019 10:30 AM Lilian Linder, OTR/L JRXODKS [facility]   12/27/2019 11:15 AM SILVERIO Coleman MMCPTPB [facility]   12/30/2019  9:45 AM Orville Ríos, SLP BGWBEMY [facility]   12/30/2019 10:30 AM John Connolly, PTA MMCPTPB [facility]   12/30/2019 11:00 AM Lilian Linder, OTR/L MMCPTPB [facility]   12/31/2019  9:45 AM Lilian Linder, OTR/L MMCPTPB [facility]   12/31/2019 10:30 AM John Connolly PTA MMCPTPB [facility]   12/31/2019 11:30 AM Sarah Smith I SLP FJDEDXR [facility]   1/2/2020  9:45 AM Albaro Hamilton DQQTWGR [facility]   1/2/2020 10:30 AM Orville Ríos, SLP SRTBAUJ [facility]

## 2019-11-22 ENCOUNTER — HOSPITAL ENCOUNTER (OUTPATIENT)
Dept: PHYSICAL THERAPY | Age: 64
Discharge: HOME | End: 2019-11-22
Payer: MEDICARE

## 2019-11-22 ENCOUNTER — DOCUMENTATION ONLY (OUTPATIENT)
Dept: NEUROLOGY | Age: 64
End: 2019-11-22

## 2019-11-22 PROCEDURE — 97112 NEUROMUSCULAR REEDUCATION: CPT

## 2019-11-22 PROCEDURE — 97110 THERAPEUTIC EXERCISES: CPT

## 2019-11-22 PROCEDURE — 92526 ORAL FUNCTION THERAPY: CPT

## 2019-11-22 PROCEDURE — 97032 APPL MODALITY 1+ESTIM EA 15: CPT

## 2019-11-22 NOTE — PROGRESS NOTES
PT DAILY TREATMENT NOTE 10-18    Patient Name: Monika Bell  Date:2019  : 1955  [x]  Patient  Verified  Payor: [place] MEDICARE / Plan: [place] MEDICARE PART A & B / Product Type: Medicare /    In time:300  Out time:332  Total Treatment Time (min): 32  Visit #: 11 of 24    Medicare/BCBS Only   Total Timed Codes (min):  32 1:1 Treatment Time:  32       Treatment Area: Lower extremity weakness [R29.898]  Cerebral infarction, unspecified [I63.9]    SUBJECTIVE  Pain Level (0-10 scale): 0/10  Any medication changes, allergies to medications, adverse drug reactions, diagnosis change, or new procedure performed?: [x] No    [] Yes (see summary sheet for update)  Subjective functional status/changes:   [] No changes reported  Pt stated that he is doing well today    OBJECTIVE    32 min Therapeutic Exercise:  [x] See flow sheet :   Rationale: increase ROM, increase strength, improve coordination and improve balance to improve the patients ability to increase ease with ADLs    With   [x] TE   [] TA   [] neuro   [] other: Patient Education: [x] Review HEP    [] Progressed/Changed HEP based on:   [] positioning   [] body mechanics   [] transfers   [] heat/ice application    [] other:      Other Objective/Functional Measures:   FOTO 52   Pt was hyperextending his right knee with marches    Pain Level (0-10 scale) post treatment: 0/10    ASSESSMENT/Changes in Function:   See recert    Patient will continue to benefit from skilled PT services to modify and progress therapeutic interventions, address functional mobility deficits, address ROM deficits, address strength deficits, analyze and cue movement patterns, analyze and modify body mechanics/ergonomics, assess and modify postural abnormalities, address imbalance/dizziness and instruct in home and community integration to attain remaining goals.      []  See Plan of Care  [x]  See progress note/recertification  []  See Discharge Summary         Progress towards goals / Updated goals:  Short Term Goals: To be accomplished in 1 weeks:               1. Pt will be compliant with a HEP to improve function.   Goal met. 10/23/19     Long Term Goals: To be accomplished in 8 weeks:               1. Pt will ambulate 4 steps x 3 with 1 HR with supervision to be able to navigate stairs at home.                2. Pt will increase FOTO score by 7 pts to improve LE function. Progressing. Increased from 46 to 49. 11/22/19               3. Pt will increase right LE hip, quad and HS strength to 4/5 or greater to be able to navigate community distances. Slowly progressing. 11/8/19               4. Pt will demonstrate [place] EO/EC on a compliant surface to decrease fall risk during ADL's. Progressing. Pt is able to maintain balance on floor in [place] with EO and EC and EO on foam. 11/22/19               5/. Pt will perform sit to stand x 10 to ease with transfers safely. MET. 11/4/19               6. Pt will ambulate with LRAD >150 ft to be able to navigate short community distances.   Progressing.  Pt is able to ambulate 150' slowly, but cont to use [employer] [place]. 11/22/19    PLAN  []  Upgrade activities as tolerated     [x]  Continue plan of care  []  Update interventions per flow sheet       []  Discharge due to:_  []  Other:_      Candice Neil, PTA 11/22/2019  3:04 PM    Future Appointments   Date Time Provider Marc Ruvalcaba   11/22/2019  3:30 PM Efraín Thomas ALHKTBS [facility]   11/22/2019  4:15 PM SILVERIO Adams MMCPTPB [facility]   11/25/2019 12:00 PM Diana Verdin, PT MMCPTPB [facility]   11/25/2019  1:00 PM Ammy Fitzpatrick OTR/L MMCPTPB [facility]   11/25/2019  1:45 PM SILVERIO Santillan NHMYJSV [facility]   11/26/2019  1:45 PM SILVERIO Santillan YQHWGLI [facility]   11/26/2019  2:45 PM Efraín Thomas FJDYLHA [facility]   11/27/2019  3:00 PM Kia Aguillon PTA MMCPTPB [facility]   11/27/2019  4:00 PM Efraín Thomas PMKENUV [facility]   12/2/2019  1:00 PM Kia Aguillon PTA MMCPTPB [facility]   12/2/2019  1:45 PM Denita He, SLP QIKQBJP [facility]   2019  2:30 PM Cherl Jose, OTR/L MMCPTPB [facility]   2019 10:30 AM Sandra Billy, PTA MMCPTPB [facility]   2019 11:00 AM Benedicto Jose KKXDHSD [facility]   2019 12:00 PM Denita Mackay, SLP DXNLPFW [facility]   2019  1:15 PM Benedicto Jose LHSJSMS [facility]   2019  2:00 PM Denita He, SLP MMCPTPB [facility]   2019  9:45 AM Denita Mackay, SLP PYXYCVI [facility]   2019 10:30 AM Katherine Ledesma, PT MMCPTPB [facility]   2019 11:00 AM Benedicto Jose VGOAHQR [facility]   2019  9:30 AM Sandra Billy, PTA MMCPTPB [facility]   2019 10:00 AM Vernas Talis I, SLP VKCPNZB [facility]   2019 11:00 AM Nallelyl Jose, OTR/L MMCPTPB [facility]   2019  9:45 AM Gregory Cesars I, SLP METBVSR [facility]   2019 10:30 AM Cherl Jose, OTR/L MMCPTPB [facility]   2019 12:00 PM Katherine Ledesma, PT MMCPTPB [facility]   2019  1:00 PM Benedicto Jose MMROVVK [facility]   2019  1:45 PM Denita Mackay, SLP GHEDCEF [facility]   2019  2:00 PM Sandra Billy, PTA MMCPTPB [facility]   2019  2:45 PM Gregory Cesars I, SLP OQFZJAF [facility]   2019  3:30 PM Benedicto Jose CBANCTM [facility]   2019  1:00 PM SILVERIO Shook CXUVEVH [facility]   2019  2:00 PM Benedicto Jose AFTQEVZ [facility]   2019  9:00 AM Sandra Billy PTA MMCPTPB [facility]   2019  9:30 AM Benedicto Jose WVXDFMW [facility]   2019 10:30 AM Gregory Mendoza I, SLP FVSLVEW [facility]   2019  9:00 AM Sandra Billy PTA MMCPTPB [facility]   2019  9:30 AM Benedicto Jose ATVCIBZ [facility]   2019 10:15 AM SILVERIO Shook CTYGQHQ [facility]   2019 10:30 AM Russell Garcia OTR/ROVERTO CDNISKF [facility]   2019 11:15 AM SILVERIO Goyal MMCPTPB [facility]   2019  9:45 AM Denita He, SLP NQCHGFB [facility]   2019 10:30 AM Sandra Billy, PTA MMCPTPB [facility]   2019 11:00 AM Russell Garcia, OTR/L MMCPTPB [facility]   2019  9:45 AM Russell Garcia, OTR/L MMCPTPB [facility]   2019 10:30 AM Kristine, Tania Thrasher [facility] [address]   12/31/2019 11:30 AM SILVERIO Burgos [id number] [address]   1/2/2020  9:45 AM Sue Jiménez DSNLVWS [address]   1/2/2020 10:30 AM SILVERIO Burgos [id number] [address]   1/30/2020  8:30 PM [address] SLEEP RM 4 MMCSL [address]   2/10/2020  8:30 AM Maliha Sow MD [address]   2/14/2020 10:15 AM Sabina White MD [address] [facility]

## 2019-11-22 NOTE — PROGRESS NOTES
ST DAILY TREATMENT NOTE    Patient Name: Charlene Vaughan  Date:2019  : 1955  [x]  Patient  Verified  Payor: Payor: [place] MEDICARE / Plan: [place] MEDICARE PART A & B / Product Type: Medicare /   In time:425 Out time:500  Total Treatment Time (min): 35  Visit #: 11 of 24    Treatment Diagnosis: Dysarthria and anarthria [R47.1]    SUBJECTIVE  Pain Level (0-10 scale): 0  Any medication changes, allergies to medications, adverse drug reactions, diagnosis change, or new procedure performed?: [x] No    [] Yes (see summary sheet for update)    Subjective functional status/changes:   [] No changes reported  \"My speech has sounded a lot clearer\"    OBJECTIVE  Treatment provided includes:  Increase/Improve:  []  Voice Quality []  Cognitive Linguistic Skills [x]  Laryngeal/Pharyngeal Exercises   []  Vocal Loudness []  Reading Comprehension [x]  Swallowing Skills    []  Vocal Cord Function []  Auditory Comprehension []  Oral Motor Skills   []  Resonance []  Writing Skills [x]  Compensatory strategies    [x]  Speech Intelligibility []  Expressive Language []  Attention   []  Breath Support/Coord.  []  Receptive language []  Memory   [x]  Articulation []  Safety Awareness [x] Patient Education   []  Fluency []  Word Retrieval []        Treatment Provided:  - Pharyngeal/laryngeal strengthening exercises  - /s/ at the word level  - /l/ sentences  - Patient Education    Patient/Caregiver  Education: [x] Review HEP      HEP/Handouts given: /l/ sentences    Pain Level (0-10 scale) post treatment: 0    ASSESSMENT   Increased self corrections during articulatory tasks  []   Improving appropriately and progressing toward goals  [x]   Improving slowly and progressing toward goals  []   Approximating goals/maximum potential  [x]   Continues to benefit from skilled therapy to address remaining functional deficits  []   Not progressing toward goals and plan of care will be adjusted    Patient will continue to benefit from skilled therapy to address remaining functional deficits: dysarthria, dysphagia    Progress towards goals / Updated goals:  1. The pt will increase quality and length of phonation by sustaining ah utilizing effective diaphragmatic breath support for >10 seconds in 3/3 sessions to increase functional speech intelligibility. Current 11/22/19: Not targeted    2. Pt will demonstrate ability to produce improve prosody, rhyme, rhythm in structured sentences, word emphasis tasks, and varying pitch phrases using various comp strategies with 80% acc given min-mod cues in 3/3opportunities to improve naturalness of speech. Current 11/22/19: Structured sentences x75% min A    3. The patient will articulate  (s/z) consonants at the word level- phrases level with 90% acc with use of comp strategies and OMES  to improve speech intelligibility to > 90% acc when provided with min cues. Current 11/22/19: /s/ in initial and final position informally assessed ~80% accuracy with min cues/reps    4. The patient will articulate  (l, voiced /th/ consonants at the word level- phrases level  with 90% acc with use of comp strategies and OMES  to improve speech intelligibility to > 90% acc when provided with min cues. Current 11/22/19: /l/ in sentences x 78% rocío, inc to >90% with min-mod cues/reps    5. Patient will complete tongue base retraction,and laryngeal/pharyngeal strengthening exercises (including Sheyla maneuver, Mendelsohn maneuver, modified Shaker with CTAR ball, hard /k/ in isolation,  effortful swallow, etc.), with min cues in 5/5 sessions in order to improve the strength/agility/efficiency of his swallow for improved swallowing safety and QOL. Current 11/22/19: Sheyla x15 with occasional cues, Mendulsohn with 5 second holds x15 min- mod cues    6.  Patient will recall/demonstrate aspiration precautions and safe swallowing strategies with 100% acc when provided with occasional cues in 5/5 sessions (small sips/bites; chin tuck with all liquid intake; alternate liquids/solids; slow rate; NO straws, Sit upright at 90 degree angle during PO trials)  to decrease the reported incidences of dysphagia and s/sx of aspiration. Current 11/22/19: 100% usage with thin liquids across session    7. Patient will tolerate trials of thin liquids (>4 ounces) using compensatory strategies without s/sx of aspiration/penetration when provided with Michael across 5/5 sessions. Current 11/22/19: ~4 oz without s/sx across session    8.  Patient will tolerate regular solids with utilization of compensatory strategies without s/sx of aspiration/penetration or distress with Michael across 5/5 sessions.   Current 11/22/19: Not assessed during session, patient reports no difficulty consuming solids at home without s/sx of aspiration or distress    PLAN  [x]  Continue plan of care  []  Modify Goals/Treatment Plan      []  Discharge due to:  [] Other:    SILVERIO Zhang 11/22/2019  1:55 PM    Future Appointments   Date Time Provider Marc Ruvalcaba   11/22/2019  3:00 PM Joseph Lutz PTA MMCPTPB [facility]   11/22/2019  3:30 PM Steffi Pearson JQBYJAM [facility]   11/22/2019  4:15 PM SILVERIO Finn MMCPTPB [facility]   11/25/2019 12:00 PM Megan Marion PT MMCPTPB [facility]   11/25/2019  1:00 PM Denis Patten OTR/ROVERTO MMCPTPB [facility]   11/25/2019  1:45 PM SILVERIO Jordan MMCPTPB [facility]   11/26/2019  1:45 PM SILVERIO Jordan MMCPTPB [facility]   11/26/2019  2:45 PM Steffi PEGUERO [facility]   11/27/2019  3:00 PM Joseph Lutz PTA MMCPTPB [facility]   11/27/2019  4:00 PM Steffi Pearson MXOBIFF [facility]   12/2/2019  1:00 PM Joseph Lutz PTA MMCPTPB [facility]   12/2/2019  1:45 PM SILVERIO Jordan MMCPTPB [facility]   12/2/2019  2:30 PM Denis Patten OTR/ROVERTO MMCPTPB [facility]   12/4/2019 10:30 AM Joseph Lutz PTA MMCPTPB [facility]   12/4/2019 11:00 AM Steffi Pearson TGJPSSJ [facility]   12/4/2019 12:00 PM SILVERIO Jordan MMCPTPB [facility]   12/6/2019  1:15 PM Steffi Pearson FQWPOXV [facility]   12/6/2019  2:00 PM Diana Zarco, SLP MMCPTPB [facility]   12/9/2019  9:45 AM SILVERIO Swartz NPEGBGM [facility]   12/9/2019 10:30 AM Wing Alvarado, PT MMCPTPB [facility]   12/9/2019 11:00 AM Torrie Starch ZKVCRDB [facility]   12/11/2019  9:30 AM Kevin Marin, PTA MMCPTPB [facility]   12/11/2019 10:00 AM Olive Colvin I, SILVERIO PGNXXLM [facility]   12/11/2019 11:00 AM Rafaela Bee, OTR/L MMCPTPB [facility]   12/13/2019  9:45 AM SILVERIO Duarte LRXPNWS [facility]   12/13/2019 10:30 AM Rafaela Bee, OTR/L MMCPTPB [facility]   12/16/2019 12:00 PM Wing Alvarado, PT [facility]CPTPB [facility]   12/16/2019  1:00 PM Torrie Starch PVZNFLL [facility]   12/16/2019  1:45 PM SILVERIO Swartz BQYYSAC [facility]   12/18/2019  2:00 PM Kevin Marin, PTA MMCPTPB [facility]   12/18/2019  2:45 PM SILVERIO Swartz EAXIPFM [facility]   12/18/2019  3:30 PM Torrie Starch JEIDKTR [facility]   12/20/2019  1:00 PM SILVERIO Swartz RYWUYKT [facility]   12/20/2019  2:00 PM Torrie Starch GTONVVR [facility]   12/23/2019  9:00 AM Kevin Marin, PTA MMCPTPB [facility]   12/23/2019  9:30 AM Torrie Starch CDXDHDO [facility]   12/23/2019 10:30 AM Olive Colvin I, SLP SXGXYSU [facility]   12/24/2019  9:00 AM Kevin Marin, PTA MMCPTPB [facility]   12/24/2019  9:30 AM Torrie Starch FGCJDBB [facility]   12/24/2019 10:15 AM SILVERIO Swartz ICQZGEH [facility]   12/27/2019 10:30 AM Rafaela Bee, OTR/L PEXAGVB [facility]   12/27/2019 11:15 AM Carmela Mills SLP MMCPTPB [facility]   12/30/2019  9:45 AM SILVERIO Swartz GCNCIPS [facility]   12/30/2019 10:30 AM Kevin Marin, PTA MMCPTPB [facility]   12/30/2019 11:00 AM Rafaela Bee, OTR/L MMCPTPB [facility]   12/31/2019  9:45 AM Rafaela Null, OTR/L MMCPTPB [facility]   12/31/2019 10:30 AM Keivn Marin, PTA MMCPTPB [facility]   12/31/2019 11:30 AM Olive Colvin I, SLP LLFQGUJ [facility]   1/2/2020  9:45 AM Torrie Starch SWITUMD [facility]   1/2/2020 10:30 AM SILVERIO Duarte MMCPTPB [facility]   1/30/2020  8:30 PM [facility] SLEEP RM 4 MMCSL [facility]   2/10/2020  8:30 AM Yeni Perez MD [address] 2/14/2020 10:15 AM Seth Santiago MD [address] [facility]

## 2019-11-22 NOTE — PROGRESS NOTES
OT DAILY TREATMENT NOTE 10-18    Patient Name: Hong Hernandez  Date:2019  : 1955  [x]  Patient  Verified  Payor: [place] MEDICARE / Plan: VA MEDICARE PART A & B / Product Type: Medicare /    In time:330  Out time:410  Total Treatment Time (min): 40  Visit #: 12 of 24    Medicare/BCBS Only   Total Timed Codes (min):  40 1:1 Treatment Time:  40     Treatment Area: Upper extremity weakness [R29.898]  Cerebral infarction, unspecified [I63.9]    SUBJECTIVE  Pain Level (0-10 scale): 0/10  Any medication changes, allergies to medications, adverse drug reactions, diagnosis change, or new procedure performed?: [x] No    [] Yes (see summary sheet for update)  Subjective functional status/changes:   [] No changes reported  I would have thought my hand would work     OBJECTIVE    Modality rationale: increase muscle contraction/control to improve the patients ability to ability to move wrist   Min Type Additional Details     []? Estim:  []? Unatt       []? IFC  []? Premod                        []?Other:  []?w/ice   []?w/heat  Position:  Location:   15 []? Estim: [x]? Att    []? TENS instruct  [x]? NMES                    []?Other:  []?w/US   []?w/ice   []?w/heat  Position:  Location: Right Wrist/digit extensors      []? Traction: []? Cervical       []? Lumbar                       []? Prone          []? Supine                       []?Intermittent   []? Continuous Lbs:  []? before manual  []? after manual     []? Ultrasound: []? Continuous   []? Pulsed                           []? 1MHz   []? 3MHz Location:  W/cm2:     []? Iontophoresis with dexamethasone         Location: []? Take home patch   []? In clinic     []? Ice     []?  heat  []? Ice massage  []? Laser   []? Anodyne Position:  Location:     []? Laser with stim  []? Other: Position:  Location:     []? Vasopneumatic Device Pressure:       []? lo []? med []? hi   Temperature: []? lo []? med []? hi   [x]? Skin assessment post-treatment:  [x]? intact []? redness- no adverse reaction  []? redness - adverse reaction:     25 min Neuromuscular Re-education:  []  See flow sheet :   Rationale: increase ROM and increase strength  to improve the patients ability to move right arm    Seated:   Elbow flexion/extension on table  Floor Reach  Retraction/Elevation  Recip elbow flexion/extension   Hand to table 6x  Grasp bottle         With   [] TE   [] TA   [] neuro   [] other: Patient Education: [x] Review HEP    [] Progressed/Changed HEP based on:   [] positioning   [] body mechanics   [] transfers   [] heat/ice application   [] Splint wear/care   [] Sensory re-education   [] scar management      [] other:            Other Objective/Functional Measures:     Increased difficulty with functional grasp task at table level     Pain Level (0-10 scale) post treatment: 0/10    ASSESSMENT/Changes in Function: difficulty with grasp    Patient will continue to benefit from skilled OT services to modify and progress therapeutic interventions, address ROM deficits, address strength deficits, analyze and address soft tissue restrictions, analyze and cue movement patterns and instruct in home and community integration to attain remaining goals. []  See Plan of Care  []  See progress note/recertification  []  See Discharge Summary         Progress towards goals / Updated goals:  1.  Patient will be independent in home exercise program for AAROM of right UE to reduce level of assist for self care   Status at Eval/Last Progress Note: not addressed at eval  Status at Last Visit: Met 11/8/19     2.  Patient will be able to don right shoe with AFO with supervision.   Status at Eval/Last Progress Note:  Current status:  min assist to fix heel of shoe with shoehoen     3.  Patient will be familiar with task modification to allow him to bathe left side without assist.  Status at Eval/Last Progress Note: not addressed at eval  Current status;: Education on adaptive bathing techniques/strategies,ordered long handled sponge, met 11/18/19        Long Term Goals: To be accomplished in 8 weeks:              1.  Patient will be able to place right arm on table using active motion for use of right hand as assist  Status at Eval/Last Progress Note:  Current status: , met x 5 today 11/18/19, 11/22/19     2.  Patient will be Modified independent for all self care tasks. Status at Eval/Last Progress Note: Not adressed  Current status::progressing, adaptive techniques for donning right sneaker given with demo 11/8/19, awaiting long handled sponge, still with min assist to get heel over AFO correctly 11/18/19     3.  Patient will be able to use right hand as stabilizing assist for self care activities.   Status at Eval/Last Progress Note: unable   Current status:  Able to hold wash cloth intermittently With Right hand 11/8/19, met with pill bottles 11/18/19     4.  Patient will report improved ability to use right hand to assist with home care as shown by reduced limitation of at least 20% on Neuro quality of Life.   Status at Eval/Last Progress Note: 82% limitation   Current status:Not reassessed       PLAN  []  Upgrade activities as tolerated     [x]  Continue plan of care  []  Update interventions per flow sheet       []  Discharge due to:_  []  Other:_      DARELL Polanco 11/22/2019  3:10 PM    Future Appointments   Date Time Provider Marc Ruvalcaba   11/22/2019  3:30 PM Kim Perez SGQEWGP [facility]   11/22/2019  4:15 PM SILVERIO Gaines MMCPTPB [facility]   11/25/2019 12:00 PM Arash Greene, SUKHWINDER MMCPTPB [facility]   11/25/2019  1:00 PM MALLIKA Bergeron/L MMCPTPB [facility]   11/25/2019  1:45 PM SILVERIO Erickson MMCPTPB [facility]   11/26/2019  1:45 PM SILVERIO Erickson MMCPTPB [facility]   11/26/2019  2:45 PM Kim Perez QCKTKRE [facility]   11/27/2019  3:00 PM LINDA HamptonPTIMTIAZ [facility]   11/27/2019  4:00 PM Kim Perez HJUFEMG [facility]   12/2/2019  1:00 PM Alanna Fishman PTA MMCPTIMTIAZ [facility]   12/2/2019  1:45 PM SILVERIO Win EQSMRZQ [facility]   12/2/2019  2:30 PM Levonia Ohm, OTR/L MMCPTPB [facility]   12/4/2019 10:30 AM Khari Reyez, PTA MMCPTPB [facility]   12/4/2019 11:00 AM Severiano Reddish UUWCKIJ [facility]   12/4/2019 12:00 PM SILVERIO Win BAFGVYM [facility]   12/6/2019  1:15 PM Severiano Reddish LZZTGIR [facility]   12/6/2019  2:00 PM SILVERIO Win MMCPTPB [facility]   12/9/2019  9:45 AM SILVERIO Win VCDVLVL [facility]   12/9/2019 10:30 AM Myron Gastelum, PT MMCPTPB [facility]   12/9/2019 11:00 AM Severiano Reddish PMYVBAM [facility]   12/11/2019  9:30 AM Khrai Reyez, PTA MMCPTPB [facility]   12/11/2019 10:00 AM SILVERIO White INDBGJQ [facility]   12/11/2019 11:00 AM Levonia Ohm, OTR/L MMCPTPB [facility]   12/13/2019  9:45 AM SILVERIO White UGMTWNJ [facility]   12/13/2019 10:30 AM Levonia Ohm, OTR/L MMCPTPB [facility]   12/16/2019 12:00 PM Gerlaytonne Gastelum, PT MMCPTPB [facility]   12/16/2019  1:00 PM Severiano Reddish SGHMRKU [facility]   12/16/2019  1:45 PM SILVERIO Win PZNEFHK [facility]   12/18/2019  2:00 PM Khari Reyez, PTA MMCPTPB [facility]   12/18/2019  2:45 PM SILVERIO White CXKZIPH [facility]   12/18/2019  3:30 PM Severiano Reddish OSWVWNZ [facility]   12/20/2019  1:00 PM SILVERIO Win RYKBUHE [facility]   12/20/2019  2:00 PM Severiano Reddish JFCDSFH [facility]   12/23/2019  9:00 AM Khari Reyez, PTA MMCPTPB [facility]   12/23/2019  9:30 AM Severiano Reddish QHHMFNA [facility]   12/23/2019 10:30 AM SILVERIO White RWKAMCM [facility]   12/24/2019  9:00 AM Khari Reyez, PTA MMCPTPB [facility]   12/24/2019  9:30 AM Severiano Reddish QDMURSF [facility]   12/24/2019 10:15 AM SILVERIO Win WBUFPMZ [facility]   12/27/2019 10:30 AM Levonia Ohm, OTR/L HOJYEBZ [facility]   12/27/2019 11:15 AM Nancy Machado SLP MMCPTPB [facility]   12/30/2019  9:45 AM SILVERIO Win VMDTWCY [facility]   12/30/2019 10:30 AM Khari Reyez, PTA MMCPTPB [facility]   12/30/2019 11:00 AM Levonia Ohm, OTR/L MMCPTPB [facility]   12/31/2019  9:45 AM Levonia Ohm, OTR/L MMCPTPB [facility]   12/31/2019 10:30 AM Kristine Candi Dukse [facility]   12/31/2019 11:30 AM SILVERIO Rodriguez IBVTXNY [facility]   1/2/2020  9:45 AM Ankita Earl AAAHWMG [facility]   1/2/2020 10:30 AM SILVERIO Rodriguez LWEWLSE [facility]   1/30/2020  8:30 PM [facility] SLEEP RM 4 MMCSL [facility]   2/10/2020  8:30 AM Lei Boas, MD [address]   2/14/2020 10:15 AM Colby Bowles MD [facility]

## 2019-11-25 ENCOUNTER — HOSPITAL ENCOUNTER (OUTPATIENT)
Dept: PHYSICAL THERAPY | Age: 64
Discharge: HOME | End: 2019-11-25
Payer: MEDICARE

## 2019-11-25 PROCEDURE — 97110 THERAPEUTIC EXERCISES: CPT

## 2019-11-25 PROCEDURE — 97112 NEUROMUSCULAR REEDUCATION: CPT

## 2019-11-25 PROCEDURE — 92507 TX SP LANG VOICE COMM INDIV: CPT

## 2019-11-25 PROCEDURE — 92526 ORAL FUNCTION THERAPY: CPT

## 2019-11-25 NOTE — TELEPHONE ENCOUNTER
Claudio Mccracken w/[facility] calling to check status of CMN. Please complete and fax back. She is also requesting pt's home sleep study results.  Please fax

## 2019-11-25 NOTE — PROGRESS NOTES
[facility] [place] [employer]  [address] [facility]  [phone] fax    Continued Plan of Care/ Re-certification for Physical Therapy Services    Patient name: Zoran Maxwell Start of Care: 10/22/2019   Referral source: Dex Alberto NP : 1955                Medical Diagnosis: Cerebral infarction, unspecified [I63.9]  Payor: [place] MEDICARE / Plan: VA MEDICARE PART A & B / Product Type: Medicare /  Onset Date:19                Treatment Diagnosis: CVA/Right Hemiparesis   Prior Hospitalization: see medical history Provider#: [id number]   Medications: Verified on Patient summary List    Comorbidities: Diabetes, HBP, OA of right Ankle,Kidney disease, Dysphagia and /Dysarthria   Prior Level of Function: Retired [employer]. Likes to attend [employer] reinactments,             Visits from Start of Care: 12    Missed Visits: 0    The Plan of Care and following information is based on the patient's current status:  Goal:Pt will be compliant with a HEP to improve function. Status at last note/certification:issued at [facility]  Current Status: met    Goal: Pt will ambulate 4 steps x 3 with 1 HR with supervision to be able to navigate stairs at home. Status at last note/certification:  Current Status: progressing    Goal:. Pt will increase right LE hip, quad and HS strength to 4/5 or greater to be able to navigate community distances. Status at last note/certification: At YSRD=6-/0  Current Status: Right LE hip and quad mmt=4/5    Goal:Pt will ambulate with LRAD >150 ft to be able to navigate short community distances.   Status at last note/certification: At eval. Around a room/ home currently  Current Status: Progressing around the clinic ~ 100 ft with SBA    Key functional changes: Pt is making progress in therapy. His Right LE strength is slowly improving, from 4-/5 at evaluation to 4/5. Pt reports no pain.  He is eager and motivated to progress in therapy to be able to return to PLOF including going on short trips with his wife. Pt ambulates with  A SBQC but his endurance would only allow for short distances currently. Pr can perform static balance activities w/o support, EO and EC with WBOS. He is becoming more independent at home but requires some help for hygiene purposes. MMT right LE quad=4/5, HS=4-/5, hip flexor=4/5    Problems/ barriers to goal attainment: none     Problem List: pain affecting function, decrease ROM, decrease strength, edema affecting function, impaired gait/ balance, decrease ADL/ functional abilitiies, decrease activity tolerance, decrease flexibility/ joint mobility and decrease transfer abilities    Treatment Plan: Therapeutic exercise, Therapeutic activities, Neuromuscular re-education, Physical agent/modality, Gait/balance training, Manual therapy, Patient education, Self Care training, Functional mobility training, Home safety training and Stair training     Patient Goal (s) has been updated and includes: To work on Ambulation and endurance. Goals for this certification period to be accomplished in 4 weeks:  1. Pt will ambulate 4 steps x 3 with 1 HR with supervision to be able to navigate stairs at home.                2. Pt will increase FOTO score by 7 pts to improve LE function.                3. Pt will increase right LE hip, quad and HS strength to 4/5 or greater to be able to navigate community distances. 4. Pt will demonstrate Romberg EO/EC on a compliant surface to decrease fall risk during ADL's.    5. Pt will ambulate with LRAD >150 ft including an obstacle course to be able to navigate short community distances safely.      Frequency / Duration: Patient to be seen 2-3 times per week for 4 weeks:    Assessment / Recommendations: Pt is making good improvement toward clinical goals. He is compliant with attendance and with HEP.  Pt will further require Physical Therapy skilled services to improve balance, endurance ambulation, strength to improve his QOL and to return to PLOF. Certification Period: 11/20/19-12/19/19    Scarlett Mohr, PT 11/25/2019 4:07 PM    ________________________________________________________________________  I certify that the above Therapy Services are being furnished while the patient is under my care. I agree with the treatment plan and certify that this therapy is necessary. [] I have read the above and request that my patient continue as recommended.   [] I have read the above report and request that my patient continue therapy with the following changes/special instructions: _______________________________________  [] I have read the above report and request that my patient be discharged from therapy    Physician's Signature:____________Date:_________TIME:________    ** Signature, Date and Time must be completed for valid certification **    Please sign and return to [facility]  [address] [place] [address]  [phone] fax

## 2019-11-25 NOTE — PROGRESS NOTES
OT DAILY TREATMENT NOTE 10-18    Patient Name: Monika Bell  Date:2019  : 1955  [x]  Patient  Verified  Payor: VA MEDICARE / Plan: VA MEDICARE PART A & B / Product Type: Medicare /    In time:1250  Out time:140  Total Treatment Time (min): 50  Visit #: 3 of 12    Medicare/BCBS Only   Total Timed Codes (min):  50 1:1 Treatment Time:  50     Treatment Area: Upper extremity weakness [R29.898]  Cerebral infarction, unspecified [I63.9]    SUBJECTIVE  Pain Level (0-10 scale): 0/10  Any medication changes, allergies to medications, adverse drug reactions, diagnosis change, or new procedure performed?: [x] No    [] Yes (see summary sheet for update)  Subjective functional status/changes:   [] No changes reported  No word yet from equipment complant  My iwfe works on this every night  What exercises should I do to get my hand stronger    OBJECTIVE    Modality rationale: increase muscle contraction/control to improve the patients ability to extend wrist and digits   Min Type Additional Details    [] Estim:  []Unatt       []IFC  []Premod                        []Other:  []w/ice   []w/heat  Position:  Location:   10 [] Estim: []Att    []TENS instruct  [x]NMES                    []Other:  []w/US   []w/ice   []w/heat  Position:wrist ext digit extLocation:    []  Traction: [] Cervical       []Lumbar                       [] Prone          []Supine                       []Intermittent   []Continuous Lbs:  [] before manual  [] after manual    []  Ultrasound: []Continuous   [] Pulsed                           []1MHz   []3MHz W/cm2:  Location:    []  Iontophoresis with dexamethasone         Location: [] Take home patch   [] In clinic    []  Ice     []  heat  []  Ice massage  []  Laser   []  Anodyne Position:  Location:    []  Laser with stim  []  Other:  Position:  Location:    []  Vasopneumatic Device Pressure:       [] lo [] med [] hi   Temperature: [] lo [] med [] hi       [x] Skin assessment post-treatment: [x]intact []redness- no adverse reaction    []redness - adverse reaction:       15 min Therapeutic Exercise:  [] See flow sheet :   Rationale: increase ROM to improve the patients ability to move right UE  Shrugs, retraction x 10 both  Push ups on counter with dycem ot increase shoulder and elbow strength x 10      25 min Neuromuscular Re-education:  []  See flow sheet :   Rationale: increase ROM and increase strength  to improve the patients ability to move right UE  Right UE  Matched resistance shoulder felx ext x 10  Standing hand to counter for shoulder flex with elbow flex x 5  Seated jose slide up incline wedge with tapping and guiding right  Seated recip sup pro with no response  Seated recip wrist flex ext no response  Seated sock slide with primarily hor adduction vs shoulder flexion         With   [] TE   [] TA   [] neuro   [x] other: Patient Education: [x] Review HEP    [] Progressed/Changed HEP based on:   [] positioning   [] body mechanics   [] transfers   [] heat/ice application   [] Splint wear/care   [] Sensory re-education   [] scar management      [] other: CVA recovery proixmal to distal           Other Objective/Functional Measures:   Still lacks functional distal response to manual facilitation  Good response to NMES     Pain Level (0-10 scale) post treatment: 0/10    ASSESSMENT/Changes in Function: Improving shoulder and elbow functton    Patient will continue to benefit from skilled OT services to modify and progress therapeutic interventions, address ROM deficits, address strength deficits, analyze and cue movement patterns and instruct in home and community integration to attain remaining goals.      []  See Plan of Care  []  See progress note/recertification  []  See Discharge Summary         Progress towards goals / Updated goals:  *1.  Patient will be independent in home exercise program for AAROM of right UE to reduce level of assist for self care   Status at Eval/Last Progress Note: not addressed at eval  Status at Last Visit: Met 11/8/19     2.  Patient will be able to don right shoe with AFO with supervision. Status at Eval/Last Progress Note:  Current status:  min assist to fix heel of shoe with shoehorn     3.  Patient will be familiar with task modification to allow him to bathe left side without assist.  Status at Eval/Last Progress Note: not addressed at eval  Current status;: Education on adaptive bathing techniques/strategies,ordered long handled sponge, met 11/18/19        Long Term Goals: To be accomplished in [address]  1.  Patient will be able to place right arm on table using active motion for use of right hand as assist  Status at Eval/Last Progress Note:  Current status: , met x 5 today 11/18/19, 11/22/19, progressing in standing with shoulder 11/25/19     2.  Patient will be Modified independent for all self care tasks. Status at Eval/Last Progress Note: Not adressed  Current status::progressing, adaptive techniques for donning right sneaker given with demo 11/8/19, awaiting long handled sponge, still with min assist to get heel over AFO correctly 11/18/19     3.  Patient will be able to use right hand as stabilizing assist for self care activities.   Status at Eval/Last Progress Note: unable   Current status:  Able to hold wash cloth intermittently With Right hand 11/8/19, met with pill bottles 11/18/19     4.  Patient will report improved ability to use right hand to assist with home care as shown by reduced limitation of at least 20% on Neuro quality of Life.   Status at Eval/Last Progress Note: 82% limitation   Current status:Not reassessed   **    PLAN  []  Upgrade activities as tolerated     []  Continue plan of care  []  Update interventions per flow sheet       []  Discharge due to:_  []  Other:_      MALLIKA Ramirez/L 11/25/2019  1:02 PM    Future Appointments   Date Time Provider Marc Ruvalcaba   11/25/2019  1:45 PM Cortez Louis, SLP MMCPTPB [address]   11/26/2019  1:45 PM Cici Barillas, SLP [id number] [address]   11/26/2019  2:45 PM Do Lazcano [id number] [address]   11/27/2019  3:00 PM Tempie Omar, PTA MMCPTPB [address]   11/27/2019  4:00 PM Do [place] [id number] [address]   12/2/2019  1:00 PM Tempie Omar, PTA MMCPTPB [address]   12/2/2019  1:45 PM Cici Barillas, SLP [id number] [address]   12/2/2019  2:30 PM Dejan Cord, OTR/L MMCPTPB [address]   12/4/2019 10:30 AM Tempie Omar, PTA MMCPTPB [address]   12/4/2019 11:00 AM Do Belll [id number] [address]   12/4/2019 12:00 PM Cici Barillas, SLP [id number] [address]   12/6/2019  1:15 PM Do Belll ILFCJSP [address]   12/6/2019  2:00 PM Cici Barillas, SLP MMCPTPB [address]   12/9/2019  9:45 AM Cici Barillas, SLP [id number] [address]   12/9/2019 10:30 AM Maddy Moseley, PT MMCPTPB [address]   12/9/2019 11:00 AM Do Lazcano EAGUGHD [address]   12/11/2019  9:30 AM Tempie Omar, PTA MMCPTPB [address]   12/11/2019 10:00 AM Laina Dunlap I SLP MMCPTPB [address]   12/11/2019 11:00 AM Dejan Cord, OTR/L MMCPTPB [address]   12/13/2019  9:45 AM Cici Braillas, SLP [id number] [address]   12/13/2019 10:30 AM Vancouver Cord, OTR/L MMCPTPB [address]   12/16/2019 12:00 PM Maddy Moseley, PT MMCPTPB [address]   12/16/2019  1:00 PM Do [place] [id number] [address]   12/16/2019  1:45 PM Cici Barillas, SLP [id number] [address]   12/18/2019  2:00 PM Guillermo Isaacer, PTA MMCPTPB [address]   12/18/2019  2:45 PM Laina Dunlap I, SLP [id number] [address]   12/18/2019  3:30 PM Do [place] [id number] [address]   12/20/2019  1:00 PM Cici Barillas, SLP [id number] [address]   12/20/2019  2:00 PM Do [place] [id number] [address]   12/23/2019  9:00 AM Akashe Omar, PTA MMCPTPB [address]   12/23/2019  9:30 AM Do [place] EXOLVXH [address]   12/23/2019 10:30 AM Laina Dunlap I, SLP [id number] [address]   12/24/2019  9:00 AM Temchloee Omar, PTA MMCPTPB [address]   12/24/2019  9:30 AM Do [place] XHAHFCR [address]   12/24/2019 10:15 AM Cici Barillas, SLP BDJARAD [address]   12/27/2019 10:30 AM Dejan Cord, OTR/L MMCPTPB [address]   12/27/2019 11:15 AM Brianda Dooley, SLP MMCPTPB [facility]   12/30/2019  9:45 AM Russ Fletcher, SILVERIO MSOKPZO [facility]   12/30/2019 10:30 AM Ron Life, PTA MMCPTPB [facility]   12/30/2019 11:00 AM Juice Sanon, OTR/L MMCPTPB [facility]   12/31/2019  9:45 AM Juice Sanon, OTR/L MMCPTPB [facility]   12/31/2019 10:30 AM Ron Life, PTA MMCPTPB [facility]   12/31/2019 11:30 AM SILVERIO Roberson [id number] [facility]   1/2/2020  9:45 AM Joey Betancur [id number] [facility]   1/2/2020 10:30 AM Richa Chawla I SLP MMCPTPB [facility]   1/30/2020  8:30 PM [facility] SLEEP RM 4 MMCSL [facility]   2/10/2020  8:30 AM Иван Langley MD [address]   2/14/2020 10:15 AM Rosa Alonso MD [place]

## 2019-11-25 NOTE — PROGRESS NOTES
ST DAILY TREATMENT NOTE    Patient Name: Mariia Trinh  Date:2019  : 1955  [x]  Patient  Verified  Payor: Payor: [place] MED[place]ARE / Plan: [place] MEDICARE PART A & B / Product Type: Medicare /   In time: 1:50  Out time: 2:30  Total Treatment Time (min): 40  Visit #: 12 of 24    Treatment Diagnosis: Dysarthria and anarthria [R47.1] Dysphagia, oropharyngeal [R13.12]    SUBJECTIVE  Pain Level (0-10 scale): 0  Any medication changes, allergies to medications, adverse drug reactions, diagnosis change, or new procedure performed?: [x] No    [] Yes (see summary sheet for update)    Subjective functional status/changes:   [x] No changes reported  Pt reports continued compliance with HEP. OBJECTIVE  Treatment provided includes:  Increase/Improve:  []  Voice Quality []  Cognitive Linguistic Skills [x]  Laryngeal/Pharyngeal Exercises   []  Vocal Loudness []  Reading Comprehension [x]  Swallowing Skills    []  Vocal Cord Function []  Auditory Comprehension []  Oral Motor Skills   []  Resonance []  Writing Skills [x]  Compensatory strategies    [x]  Speech Intelligibility []  Expressive Language []  Attention   [x]  Breath Support/Coord. []  Receptive language []  Memory   [x]  Articulation []  Safety Awareness [x] Pt education   []  Fluency []  Word Retrieval []        Treatment Provided:  -oropharyngeal strengthening exercises with skilled instruction   -diaphragmatic breathing exercises/coordination with phonation for prolonged vowel   - po trials of regular solids and thin liquids while utilizing compensatory strategies with skilled cueing   - /s/ in multi-syllabic words   - pt education    Patient/Caregiver  Education: [x] Review HEP      HEP/Handouts given: continue established HEP     Pain Level (0-10 scale) post treatment: 0    ASSESSMENT   Progressing across speech related tasks.    []   Improving appropriately and progressing toward goals  [x]   Improving slowly and progressing toward goals  [] Approximating goals/maximum potential  [x]   Continues to benefit from skilled therapy to address remaining functional deficits  []   Not progressing toward goals and plan of care will be adjusted    Patient will continue to benefit from skilled therapy to address remaining functional deficits: dysphagia , dysarthria     Progress towards goals / Updated goals:  1. The pt will increase quality and length of phonation by sustaining ah utilizing effective diaphragmatic breath support for >10 seconds in 3/3 sessions to increase functional speech intelligibility. Current 11/25/19: progressing x1/3 sessions, greater than 10 sec across 5 trials : (1) ~13 sec, (2)~12 sec, (3) ~13 sec, (4) ~11 sec, (5) ~14 secs with Michael  2. Pt will demonstrate ability to produce improve prosody, rhyme, rhythm in structured sentences, word emphasis tasks, and varying pitch phrases using various comp strategies with 80% acc given min-mod cues in 3/3opportunities to improve naturalness of speech. 11/25/19:  Not addressed ; Prior 11/22/19: Structured sentences x75% min A  3. The patient will articulate  (s/z) consonants at the word level- phrases level with 90% acc with use of comp strategies and OMES  to improve speech intelligibility to > 90% acc when provided with min cues. Current 11/22/19: /s/ in initial position of multi-syllable words with 85% accuracy with min-modA/modeling   4. The patient will articulate  (l, voiced /th/ consonants at the word level- phrases level  with 90% acc with use of comp strategies and OMES  to improve speech intelligibility to > 90% acc when provided with min cues.   11/25/19:  Not addressed ; Prior 11/22/19: /l/ in sentences x 78% rocío, inc to >90% with min-mod cues/reps  5. Patient will complete tongue base retraction,and laryngeal/pharyngeal strengthening exercises (including Sheyla maneuver, Mendelsohn maneuver, modified Shaker with CTAR ball, hard /k/ in isolation,  effortful swallow, etc.), with min cues in 5/5 sessions in order to improve the strength/agility/efficiency of his swallow for improved swallowing safety and QOL. Current 11/25/19: modified shaker x25 5 sec holds, Sheyla x20 with Michael, high \"ee\" x10 with Michael  6. Patient will recall/demonstrate aspiration precautions and safe swallowing strategies with 100% acc when provided with occasional cues in 5/5 sessions (small sips/bites; chin tuck with all liquid intake; alternate liquids/solids; slow rate; NO straws, Sit upright at 90 degree angle during PO trials)  to decrease the reported incidences of dysphagia and s/sx of aspiration. Current 11/25/19:  min cues to alternate solids and liquids   7. Patient will tolerate trials of thin liquids (>4 ounces) using compensatory strategies without s/sx of aspiration/penetration when provided with Michael across 5/5 sessions. Current 11/25/19:  ~4 oz without s/sx across session, cannot rule out silent aspiration x2/5 sessions    8. Patient will tolerate regular solids with utilization of compensatory strategies without s/sx of aspiration/penetration or distress with Michael across 5/5 sessions.   Current 11/25/19: x1 throat clear prior to initiation of swallow , no overt s/sx of aspiration noted with crackers across additional 4 trials - mild lingual statis and pocketing in R buccal area corrected with lingual sweep and double swallow     PLAN  [x]  Continue plan of care  []  Modify Goals/Treatment Plan      []  Discharge due to:  [] Other:    SILVERIO Remy 11/25/2019  1:55 PM    Future Appointments   Date Time Provider Marc Ruvalcaba   11/26/2019  1:45 PM SILVERIO Rodriguez MMCPTPB [facility]   11/26/2019  2:45 PM Ankita Earl ZMMWJAE [facility]   11/27/2019  3:00 PM Matthew Doyle PTA MMCPTPB [facility]   11/27/2019  4:00 PM Ankita Earl BMGJDXN [facility]   12/2/2019  1:00 PM Matthew Doyle PTA [facility]CPTPB [facility]   12/2/2019  1:45 PM Princess Orourke SLP MMCPTPB [facility]   12/2/2019  2:30 PM Jet Andersen, OTR/L MMCPTPB [facility]   12/4/2019 10:30 AM Rut Loredo, PTA MMCPTPB [facility]   12/4/2019 11:00 AM Briigin Sonal RHQWETS [facility]   12/4/2019 12:00 PM SILVERIO Epps ULSISDZ [facility]   12/6/2019  1:15 PM Funmilayo Pruitt POVZIKI [facility]   12/6/2019  2:00 PM Dominga Bourne I, SLP MMCPTPB [facility]   12/9/2019  9:45 AM SILVERIO Epps WITVBJF [facility]   12/9/2019 10:30 AM Martha Shoulder, PT MMCPTPB [facility]   12/9/2019 11:00 AM Funmilayo Pruitt XQMGYAN [facility]   12/11/2019  9:30 AM Rut Loredo, PTA MMCPTPB [facility]   12/11/2019 10:00 AM Dominga Bourne I, SILVERIO FQLRLCB [facility]   12/11/2019 11:00 AM Nishant Somers, OTR/L MMCPTPB [facility]   12/13/2019  9:45 AM Dominga Bourne I, SILVERIO ZGGCPZG [facility]   12/13/2019 10:30 AM Nishant Boop, OTR/L MMCPTPB [facility]   12/16/2019 12:00 PM Martha Shoulder, PT MMCPTPB [facility]   12/16/2019  1:00 PM Funmilayo Pruitt QFMXIRJ [facility]   12/16/2019  1:45 PM SILVERIO Epps KFGIRHV [facility]   12/18/2019  2:00 PM Rut Loredo, PTA MMCPTPB [facility]   12/18/2019  2:45 PM SILVERIO Epps CQIEHDA [facility]   12/18/2019  3:30 PM Funmilayo Pruitt YTTVPVI [facility]   12/20/2019  1:00 PM SILVERIO Epps GHNFKTR [facility]   12/20/2019  2:00 PM Funmilayo Pruitt BWDMVJO [facility]   12/23/2019  9:00 AM Rut Loredo PTA MMCPTPB [facility]   12/23/2019  9:30 AM Funmilayo Pruitt ZNSKUZJ [facility]   12/23/2019 10:30 AM SILVERIO Simmons WRMTNVX [facility]   12/24/2019  9:00 AM Rut Loredo PTA MMCPTPB [facility]   12/24/2019  9:30 AM Funmilayo Pruitt OUQPNXI [facility]   12/24/2019 10:15 AM SILVERIO Epps JRCUIAU [facility]   12/27/2019 10:30 AM Nishant Somers, OTR/L HYRDQEJ [facility]   12/27/2019 11:15 AM SILVERIO Griffith MMCPTPB [facility]   12/30/2019  9:45 AM SILVERIO Epps XDQSDLA [facility]   12/30/2019 10:30 AM Rut Loredo PTA MMCPTPB [facility]   12/30/2019 11:00 AM Nishant Somers, OTR/L KLAKQJU [facility]   12/31/2019  9:45 AM Nishant Somers, OTR/L MMCPTPB [facility]   12/31/2019 10:30 AM Rut Loredo PTA MMCPTPB [facility]   12/31/2019 11:30 AM Fly Grider, SLP XSLHHZP [facility]   1/2/2020  9:45 AM Sriram Montiel RGPFNCI [facility]   1/2/2020 10:30 AM SILVERIO Cruz MMCPTPB [facility]   1/30/2020  8:30 PM [facility] SLEEP RM 4 MMCSL [facility]   2/10/2020  8:30 AM Ann Marie Thurston MD [address]   2/14/2020 10:15 AM Antonio Gray MD [facility]

## 2019-11-26 ENCOUNTER — HOSPITAL ENCOUNTER (OUTPATIENT)
Dept: PHYSICAL THERAPY | Age: 64
Discharge: HOME | End: 2019-11-26
Payer: MEDICARE

## 2019-11-26 ENCOUNTER — APPOINTMENT (OUTPATIENT)
Dept: PHYSICAL THERAPY | Age: 64
End: 2019-11-26
Payer: MEDICARE

## 2019-11-26 PROCEDURE — 92526 ORAL FUNCTION THERAPY: CPT

## 2019-11-26 PROCEDURE — 92507 TX SP LANG VOICE COMM INDIV: CPT

## 2019-11-26 PROCEDURE — 97110 THERAPEUTIC EXERCISES: CPT

## 2019-11-26 PROCEDURE — 97112 NEUROMUSCULAR REEDUCATION: CPT

## 2019-11-26 PROCEDURE — 97032 APPL MODALITY 1+ESTIM EA 15: CPT

## 2019-11-26 NOTE — PROGRESS NOTES
OT DAILY TREATMENT NOTE 10-18    Patient Name: Anibal Olivares  Date:2019  : 1955  [x]  Patient  Verified  Payor: [place] MEDICARE / Plan: [place] MEDICARE PART A & B / Product Type: Medicare /    In time:245  Out time:327  Total Treatment Time (min): 42  Visit #: 4 of 12    Medicare/BCBS Only   Total Timed Codes (min):  42 1:1 Treatment Time:  42     Treatment Area: Upper extremity weakness [R29.898]  Cerebral infarction, unspecified [I63.9]    SUBJECTIVE  Pain Level (0-10 scale): 0/10  Any medication changes, allergies to medications, adverse drug reactions, diagnosis change, or new procedure performed?: [x] No    [] Yes (see summary sheet for update)  Subjective functional status/changes:   [] No changes reported  I was able to hold my hand up this morning when I was laying in bed. OBJECTIVE              Modality rationale: increase muscle contraction/control to improve the patients ability to extend wrist and digits   Min Type Additional Details      []? Estim:  []? Unatt       []? IFC  []? Premod                        []?Other:  []?w/ice   []?w/heat  Position:  Location:    15 []? Estim: []? Att    []? TENS instruct  [x]? NMES                    []?Other:  []?w/US   []?w/ice   []?w/heat  Position:wrist ext digit extLocation:      []? Traction: []? Cervical       []? Lumbar                       []? Prone          []? Supine                       []?Intermittent   []? Continuous Lbs:  []? before manual  []? after manual      []? Ultrasound: []? Continuous   []? Pulsed                           []? 1MHz   []? 3MHz W/cm2:  Location:      []? Iontophoresis with dexamethasone         Location: []? Take home patch   []? In clinic      []? Ice     []?  heat  []? Ice massage  []? Laser   []? Anodyne Position:  Location:      []? Laser with stim  []? Other:  Position:  Location:      []? Vasopneumatic Device Pressure:       []? lo []? med []? hi   Temperature: []? lo []? med []? hi       [x]?  Skin assessment post-treatment:  [x]? intact []? redness- no adverse reaction  []? redness - adverse reaction:     10 min Therapeutic Exercise:  [] See flow sheet :   Rationale: increase ROM to improve the patients ability to move right UE    Shrugs/Retraction  Floor reach    17 min Neuromuscular Re-education:  []  See flow sheet :   Rationale: increase ROM and increase strength  to improve the patients ability to move right UE    Right UE:  Seated Matched resistance shoulder flexion  Seated PROM Abduction  Seated Elbow flexion/extension on raised towel  Supine: Shoulder flexion- place and hold with facilitation   Sidelying: elbow flexion/extension      With   [] TE   [] TA   [] neuro   [] other: Patient Education: [x] Review HEP    [] Progressed/Changed HEP based on:   [] positioning   [] body mechanics   [] transfers   [] heat/ice application   [] Splint wear/care   [] Sensory re-education   [] scar management      [] other:            Other Objective/Functional Measures:     Good Response to NMES  Improved ability to perform place and hold in supine with proper breathing techniques      Pain Level (0-10 scale) post treatment: 0/10    ASSESSMENT/Changes in Function: Shoulder strength improving    Patient will continue to benefit from skilled OT services to modify and progress therapeutic interventions, address ROM deficits, address strength deficits and instruct in home and community integration to attain remaining goals. []  See Plan of Care  []  See progress note/recertification  []  See Discharge Summary         Progress towards goals / Updated goals:  1.  Patient will be independent in home exercise program for AAROM of right UE to reduce level of assist for self care   Status at Eval/Last Progress Note: not addressed at eval  Status at Last Visit: Met 11/8/19     2.  Patient will be able to don right shoe with AFO with supervision.   Status at Eval/Last Progress Note:  Current status:  min assist to fix heel of shoe with arturo     3.  Patient will be familiar with task modification to allow him to bathe left side without assist.  Status at Eval/Last Progress Note: not addressed at eval  Current status;: Education on adaptive bathing techniques/strategies,ordered long handled sponge, met 11/18/19        Long Term Goals: To be accomplished in 8 weeks:              1.  Patient will be able to place right arm on table using active motion for use of right hand as assist  Status at Eval/Last Progress Note:  Current status: , met x 5 today 11/18/19, 11/22/19, progressing in standing with shoulder 11/25/19     2.  Patient will be Modified independent for all self care tasks. Status at Eval/Last Progress Note: Not adressed  Current status::progressing, adaptive techniques for donning right sneaker given with demo 11/8/19, awaiting long handled sponge, still with min assist to get heel over AFO correctly 11/18/19     3.  Patient will be able to use right hand as stabilizing assist for self care activities.   Status at Eval/Last Progress Note: unable   Current status:  Able to hold wash cloth intermittently With Right hand 11/8/19, met with pill bottles 11/18/19     4.  Patient will report improved ability to use right hand to assist with home care as shown by reduced limitation of at least 20% on Neuro quality of Life.   Status at Eval/Last Progress Note: 82% limitation   Current status:Not reassessed    PLAN  []  Upgrade activities as tolerated     []  Continue plan of care  []  Update interventions per flow sheet       []  Discharge due to:_  []  Other:_      DARELL Toledo 11/26/2019  12:52 PM    Future Appointments   Date Time Provider Marc Ruvalcaba   11/26/2019  1:45 PM Preston Du, SILVERIO MMCPTPB [facility]   11/26/2019  2:45 PM Arlys Stacks YPVUNRO [facility]   11/27/2019  3:00 PM Marcela Hinojosa PTA MMCPTPB [facility]   11/27/2019  4:00 PM Arlys Stacks WYEHHYF [facility]   12/2/2019  1:00 PM Marcela Hinojosa PTA MMCPTPB [facility]   12/2/2019  1:45 PM Preston Du, SLP MKFVDOM [facility]   12/2/2019  2:30 PM Roland Mariee, OTR/L MMCPTPB [facility]   12/4/2019 10:30 AM Marcela Hinojosa, PTA MMCPTPB [facility]   12/4/2019 11:00 AM Arlys Stacks DACRGMS [facility]   12/4/2019 12:00 PM Preston Du, SLP FMYWJMB [facility]   12/6/2019  1:15 PM Arlys Stacks VFWAAKH [facility]   12/6/2019  2:00 PM Preston Du, SLP MMCPTPB [facility]   12/9/2019  9:45 AM Preston Du, SLP WRZEXRX [facility]   12/9/2019 10:30 AM Priyank Hinds, PT MMCPTPB [facility]   12/9/2019 11:00 AM Arlys Stacks ZIZKVEZ [facility]   12/11/2019  9:30 AM Marcela Hinojosa, PTA MMCPTPB [facility]   12/11/2019 10:00 AM Betito Montalvo I, SLP WOYKDLB [facility]   12/11/2019 11:00 AM Roland Mariee, OTR/L MMCPTPB [facility]   12/13/2019  9:45 AM Betito Montalvo I, SLP VIMAETJ [facility]   12/13/2019 10:30 AM Roland Mariee, OTR/L MMCPTPB [facility]   12/16/2019 12:00 PM Priyank Hinds, PT MMCPTPB [facility]   12/16/2019  1:00 PM Arlys Stacks AWIBWYP [facility]   12/16/2019  1:45 PM SILVERIO Srinivasan YVSJDQE [facility]   12/18/2019  2:00 PM Marcela Hinojosa PTA MMCPTPB [facility]   12/18/2019  2:45 PM SILVERIO Antonio FMFCQIW [facility]   12/18/2019  3:30 PM Arlys Stacks KZEZZMK [facility]   12/20/2019  1:00 PM SILVERIO Srinivasan GTQQMER [facility]   12/20/2019  2:00 PM Arlys Stacks VLSPEWT [facility]   12/23/2019  9:00 AM Marcela Hinojosa PTA MMCPTPB [facility]   12/23/2019  9:30 AM Arlys Stacks DPEDCOV [facility]   12/23/2019 10:30 AM SILVERIO Antonio ICSZXGD [facility]   12/24/2019  9:00 AM Marcela Hinojosa PTA MMCPTPB [facility]   12/24/2019  9:30 AM Arlys Stacks YRQOAPH [facility]   12/24/2019 10:15 AM SILVERIO Srinivasan JNMAUFQ [facility]   12/27/2019 10:30 AM Roland Mariee, OTR/L VSZIJYQ [facility]   12/27/2019 11:15 AM SILVERIO Lomeli MMCPTPB [facility]   12/30/2019  9:45 AM SILVERIO Srinivasan JZCWTCF [facility]   12/30/2019 10:30 AM Marcela Hinojosa PTA MMCPTPB [facility]   12/30/2019 11:00 AM Roland Mariee, OTR/L MMCPTPB [facility]   12/31/2019  9:45 AM Roland Mariee, OTR/L MMCPTPB [facility] 12/31/2019 10:30 AM Lorie Gamez PTA MMCPTPB [facility]   12/31/2019 11:30 AM SILVERIO Kovacs JHRNOJJ [facility]   1/2/2020  9:45 AM Lainey Cardenas AFWRDTK [facility]   1/2/2020 10:30 AM SILVERIO Kovacs MMCPTPB [facility]   1/30/2020  8:30 PM [facility] SLEEP RM 4 MMCSL [facility]   2/10/2020  8:30 AM Cruz Meckel, MD [address]   2/14/2020 10:15 AM Whitney Melo MD [place]

## 2019-11-27 ENCOUNTER — HOSPITAL ENCOUNTER (OUTPATIENT)
Dept: PHYSICAL THERAPY | Age: 64
Discharge: HOME | End: 2019-11-27
Payer: MEDICARE

## 2019-11-27 PROCEDURE — 97112 NEUROMUSCULAR REEDUCATION: CPT

## 2019-11-27 PROCEDURE — 97110 THERAPEUTIC EXERCISES: CPT

## 2019-11-27 PROCEDURE — 97032 APPL MODALITY 1+ESTIM EA 15: CPT

## 2019-11-27 NOTE — PROGRESS NOTES
PT DAILY TREATMENT NOTE 10-18    Patient Name: Gifty Garcia  Date:2019  : 1955  [x]  Patient  Verified  Payor: [place] MEDICARE / Plan: [place] MEDICARE PART A & B / Product Type: Medicare /    In time:257  Out time:305  Total Treatment Time (min): 38  Visit #: 1 of 12    Medicare/BCBS Only   Total Timed Codes (min):  38 1:1 Treatment Time:  38       Treatment Area: Lower extremity weakness [R29.898]  Cerebral infarction, unspecified [I63.9]    SUBJECTIVE  Pain Level (0-10 scale): 0/10  Any medication changes, allergies to medications, adverse drug reactions, diagnosis change, or new procedure performed?: [x] No    [] Yes (see summary sheet for update)  Subjective functional status/changes:   [] No changes reported  Pt stated that he is doing pretty good today    OBJECTIVE    38 min Therapeutic Exercise:  [x] See flow sheet :   Rationale: increase ROM, increase strength, improve coordination and improve balance to improve the patients ability to increase ease with ADLs    With   [x] TE   [] TA   [] neuro   [] other: Patient Education: [x] Review HEP    [] Progressed/Changed HEP based on:   [] positioning   [] body mechanics   [] transfers   [] heat/ice application    [] other:      Other Objective/Functional Measures:   Had significant difficulty with left step over low negin due to fatigue  Pt was pleased with the bike    Pain Level (0-10 scale) post treatment: 0/10    ASSESSMENT/Changes in Function:   Pt is making slow progress toward goals. Pt cont with decreased strength in B LE. Static balance is improving.  Pt cont to become fatigued after prolonged activity    Patient will continue to benefit from skilled PT services to modify and progress therapeutic interventions, address functional mobility deficits, address ROM deficits, address strength deficits, analyze and cue movement patterns, analyze and modify body mechanics/ergonomics, assess and modify postural abnormalities, address imbalance/dizziness and instruct in home and community integration to attain remaining goals. []  See Plan of Care  [x]  See progress note/recertification  []  See Discharge Summary         Progress towards goals / Updated goals:  1.  Pt will ambulate 4 steps x 3 with 1 HR with supervision to be able to navigate stairs at home.                2. Pt will increase FOTO score by 7 pts to improve LE function.                3. Pt will increase right LE hip, quad and HS strength to 4/5 or greater to be able to navigate community distances.                 4. Pt will demonstrate Romberg EO/EC on a compliant surface to decrease fall risk during ADL's.                5. Pt will ambulate with LRAD >150 ft including an obstacle course to be able to navigate short community distances safely.     PLAN  []  Upgrade activities as tolerated     [x]  Continue plan of care  []  Update interventions per flow sheet       []  Discharge due to:_  []  Other:_      Randee Haley PTA 11/27/2019  2:51 PM    Future Appointments   Date Time Provider Marc Ruvalcaba   11/27/2019  3:00 PM Lorie Gamez PTA MMCPTPB [facility]   11/27/2019  4:00 PM Lainey Cardenas XIQNIHU [facility]   12/2/2019  1:00 PM Lorie Gamez PTA [facility]CPTPB [facility]   12/2/2019  1:45 PM SILVERIO Mccann MMCPTPB [facility]   12/2/2019  2:30 PM Ivy Limon, OTR/L MMCPTPB [facility]   12/4/2019 10:30 AM Lorie Gamez PTA MMCPTPB [facility]   12/4/2019 11:00 AM Lainey Cardenas EAIVEBD [facility]   12/4/2019 12:00 PM SILVERIO Mccann MMCPTPB [facility]   12/6/2019  1:15 PM Lainey Cardenas TYIQSSE [facility]   12/6/2019  2:00 PM SILVERIO Mccann MMCPTPB [facility]   12/9/2019  9:45 AM Scout Diego, SILVERIO AAFBEBN [facility]   12/9/2019 10:30 AM Danielle Alves, PT MMCPTPB [facility]   12/9/2019 11:00 AM Lainey Cardenas GFOMZQB [facility]   12/11/2019  9:30 AM Lorie Gamez, LINDA MMCPTPB [facility]   12/11/2019 10:00 AM Tunde Benson I, SLP MMCPTPB [facility]   12/11/2019 11:00 AM Ivy Limon, OTR/L MMCPTPB [facility]   12/13/2019  9:45 AM SILVERIO Mina MMCPTPB [facility]   12/13/2019 10:30 AM Ellie Downs, OTR/L MMCPTPB [facility]   12/16/2019 12:00 PM Андрей Deras, PT MMCPTPB [facility]   12/16/2019  1:00 PM Bryon Quinonez BBYDRQX [facility]   12/16/2019  1:45 PM SILVERIO Mina SZEOVYZ [facility]   12/18/2019  2:00 PM Aleida Gonzalez PTA MMCPTPB [facility]   12/18/2019  2:45 PM SILVERIO Mina EURUZWB [facility]   12/18/2019  3:30 PM Bryon Quinonez VXTEWPZ [facility]   12/20/2019  1:00 PM SILVERIO Mina EVZRSCM [facility]   12/20/2019  2:00 PM Bryon Quinonez SGVLJXU [facility]   12/23/2019  9:00 AM Aleida Gonzalez, PTA MMCPTPB [facility]   12/23/2019  9:30 AM Bryon Quinonez LBJAZDC [facility]   12/23/2019 10:30 AM Eli Diana I SLP YJTYGAW [facility]   12/24/2019  9:00 AM Aleida Gonzalez, PTA MMCPTPB [facility]   12/24/2019  9:30 AM Bryon Quinonez JTUWGUU [facility]   12/24/2019 10:15 AM SILVERIO Mina DCAZRJK [facility]   12/27/2019 10:30 AM Ellie Downs, OTR/L QFAOXBH [facility]   12/27/2019 11:15 AM Angeline Cooley, SLP MMCPTPB [facility]   12/30/2019  9:45 AM Emerita Cordero, SLP OMXVLNM [facility]   12/30/2019 10:30 AM Aleida Gonzalez, PTA MMCPTPB [facility]   12/30/2019 11:00 AM Ellie Downs, OTR/L MMCPTPB [facility]   12/31/2019  9:45 AM Ellie Downs, OTR/L MMCPTPB [facility]   12/31/2019 10:30 AM Aleida Gonzalez, PTA MMCPTPB [facility]   12/31/2019 11:30 AM SILVERIO Jimenez ZLXIBSC [facility]   1/2/2020  9:45 AM Bryon Quinonez FQNCVGN [facility]   1/2/2020 10:30 AM Eli Diana I, SLP MMCPTPB [facility]   1/30/2020  8:30 PM [facility] SLEEP RM 4 MMCSL [facility]   2/10/2020  8:30 AM Job Degree, MD [address]   2/14/2020 10:15 AM Darya Johnson MD [place]

## 2019-11-27 NOTE — PROGRESS NOTES
OT DAILY TREATMENT NOTE 10-18    Patient Name: Juve Mcdowell  Date:2019  : 1955  [x]  Patient  Verified  Payor: [place] MEDICARE / Plan: [place] MEDICARE PART A & B / Product Type: Medicare /    In time:345  Out time:425  Total Treatment Time (min): 40  Visit #: 5 of 12    Medicare/BCBS Only   Total Timed Codes (min):  40 1:1 Treatment Time:  40     Treatment Area: Upper extremity weakness [R29.898]  Cerebral infarction, unspecified [I63.9]    SUBJECTIVE  Pain Level (0-10 scale): 0/10  Any medication changes, allergies to medications, adverse drug reactions, diagnosis change, or new procedure performed?: [x] No    [] Yes (see summary sheet for update)  Subjective functional status/changes:   [] No changes reported  It's moving a lot more    OBJECTIVE            Modality rationale: increase muscle contraction/control to improve the patients ability to extend wrist and digits   Min Type Additional Details       []? ? Estim:  []?? Unatt       []? ?IFC  []? ?Premod                        []? ? Other:  []??w/ice   []? ?w/heat  Position:  Location:     15 []? ? Estim: []??Att    []? ?TENS instruct  [x]? ? NMES                    []? ? Other:  []??w/US   []? ?w/ice   []? ?w/heat  Position:wrist ext digit extLocation:       []? ?  Traction: []?? Cervical       []? ?Lumbar                       []? ? Prone          []? ?Supine                       []? ?Intermittent   []? ? Continuous Lbs:  []?? before manual  []? ? after manual       []? ?  Ultrasound: []??Continuous   []? ? Pulsed                           []? ?1MHz   []? ? 3MHz W/cm2:  Location:       []? ?  Iontophoresis with dexamethasone         Location: []?? Take home patch   []? ? In clinic       []? ?  Ice     []? ?  heat  []? ?  Ice massage  []? ?  Laser   []? ?  Anodyne Position:  Location:       []? ?  Laser with stim  []? ?  Other:  Position:  Location:       []? ?  Vasopneumatic Device Pressure:       []? ? lo []? ? med []?? hi   Temperature: []?? lo []? ? med []?? hi        [x]? ? Skin assessment post-treatment:  [x]? ? intact []? ?redness- no adverse reaction  []? ?redness - adverse reaction:     12 min Therapeutic Exercise:  [] See flow sheet :   Rationale: increase ROM and increase strength to improve the patients ability to move right UE    Shrugs/Retraction 15x each  Pushups on counter with dycem to increase shoulder and elbow strength 2x10  Floor Stretch 15x    13 min Neuromuscular Re-education:  []  See flow sheet :   Rationale: increase ROM and increase strength  to improve the patients ability to move Right UE    Right UE  Recip Shoulder flexion  Hand to counter , in stand, for shoulder flex with elbow flex x10  Seated towel slide up incline wedge with tapping and guiding     . With   [] TE   [] TA   [] neuro   [] other: Patient Education: [x] Review HEP    [] Progressed/Changed HEP based on:   [] positioning   [] body mechanics   [] transfers   [] heat/ice application   [] Splint wear/care   [] Sensory re-education   [] scar management      [] other:            Other Objective/Functional Measures:     Decreased cueing required for breathing techniques on this date  Good response to NMES     Pain Level (0-10 scale) post treatment: 0/10    ASSESSMENT/Changes in Function: Shoulder strength improving     Patient will continue to benefit from skilled OT services to modify and progress therapeutic interventions, address ROM deficits, address strength deficits and instruct in home and community integration to attain remaining goals. []  See Plan of Care  []  See progress note/recertification  []  See Discharge Summary         Progress towards goals / Updated goals:  1.  Patient will be independent in home exercise program for AAROM of right UE to reduce level of assist for self care   Status at Eval/Last Progress Note: not addressed at eval  Status at Last Visit: Met 11/8/19     2.  Patient will be able to don right shoe with AFO with supervision.   Status at Eval/Last Progress Note:  Current status:  min assist to fix heel of shoe with shoehorn     3.  Patient will be familiar with task modification to allow him to bathe left side without assist.  Status at al/Last Progress Note: not addressed at [place]  Current status;: Education on adaptive bathing techniques/strategies,ordered long handled sponge, met 11/18/19        Long Term Goals: To be accomplished in [address]  1.  Patient will be able to place right arm on table using active motion for use of right hand as assist  Status at Eval/Last Progress Note:  Current status: , met x 5 today 11/18/19, 11/22/19, progressing in standing with shoulder 11/25/19     2.  Patient will be Modified independent for all self care tasks. Status at al/Last Progress Note: Not adressed  Current status::progressing, adaptive techniques for donning right sneaker given with demo 11/8/19, awaiting long handled sponge, still with min assist to get heel over AFO correctly 11/18/19     3.  Patient will be able to use right hand as stabilizing assist for self care activities.   Status at Eval/Last Progress Note: unable   Current status:  Able to hold wash cloth intermittently With Right hand 11/8/19, met with pill bottles 11/18/19     4.  Patient will report improved ability to use right hand to assist with home care as shown by reduced limitation of at least 20% on Neuro quality of Life.   Status at al/Last Progress Note: 82% limitation   Current status:Not reassessed    PLAN  []  Upgrade activities as tolerated     [x]  Continue plan of care  []  Update interventions per flow sheet       []  Discharge due to:_  []  Other:_      Manus Dubin ,COTA/L 11/27/2019  12:45 PM    Future Appointments   Date Time Provider Marc Ruvalcaba   11/27/2019  3:00 PM Joseph Jewell MMCPTPB [facility]   11/27/2019  4:00 PM Jasmin GUDINOX [facility]   12/2/2019  1:00 PM Linda Joel PTA MMCPTPB [facility]   12/2/2019  1:45 PM Bharath Nieto, SLP MZLQJCZ [facility]   12/2/2019  2:30 PM Rae Buitrago, OTR/L MMCPTPB [facility]   12/4/2019 10:30 AM Onita Null, PTA MMCPTPB [facility]   12/4/2019 11:00 AM Crystal Papa NNJBXQZ [facility]   12/4/2019 12:00 PM Genesis Galicia, SLP YSZNKEK [facility]   12/6/2019  1:15 PM Crystal Papa CPYRAZY [facility]   12/6/2019  2:00 PM Melia Crouch I, SLP MXKZPIB [facility]   12/9/2019  9:45 AM Genesis Galicia, SILVERIO SHFJECF [facility]   12/9/2019 10:30 AM Stacey Novoa, PT MMCPTPB [facility]   12/9/2019 11:00 AM Crystal Papa VHCFTBA [facility]   12/11/2019  9:30 AM Onita Null, PTA MMCPTPB [facility]   12/11/2019 10:00 AM Melia Crouch I, SILVERIO UFNZSSV [facility]   12/11/2019 11:00 AM Rae Buitrago, OTR/L MMCPTPB [facility]   12/13/2019  9:45 AM Melia Crouch I, SLP GOATCAP [facility]   12/13/2019 10:30 AM Rae Buitrago, OTR/L MMCPTPB [facility]   12/16/2019 12:00 PM Stacey Novoa, PT MMCPTPB [facility]   12/16/2019  1:00 PM Crystal Papa KHAKNRK [facility]   12/16/2019  1:45 PM Genesis Galicia, SILVERIO PXVHMLT [facility]   12/18/2019  2:00 PM Onita Null, PTA MMCPTPB [facility]   12/18/2019  2:45 PM Genesis Galicia, SILVERIO RCZXDTK [facility]   12/18/2019  3:30 PM Crystal Papa GHHHPKG [facility]   12/20/2019  1:00 PM Genesis Galicia, SLP HRFOYNP [facility]   12/20/2019  2:00 PM Crystal Papa [id number] [facility]   12/23/2019  9:00 AM Onita Null, PTA MMCPTPB [facility]   12/23/2019  9:30 AM Crystal Papa MNIPPMQ [facility]   12/23/2019 10:30 AM Melia Crouch I, SLP YDRDSZJ [facility]   12/24/2019  9:00 AM Onita Null, PTA MMCPTPB [facility]   12/24/2019  9:30 AM Crystal Papa DYKRNKP [facility]   12/24/2019 10:15 AM Melia Crouch I SLP HWZSFZE [facility]   12/27/2019 10:30 AM Rae Buitrago OTR/L UXJDBGS [facility]   12/27/2019 11:15 AM SILVERIO Stevens MMCPTPB [facility]   12/30/2019  9:45 AM SILVERIO Chopra KJOMTHE [facility]   12/30/2019 10:30 AM Onita Null, PTA MMCPTPB [facility]   12/30/2019 11:00 AM Rae Buitrago, OTR/L MMCPTPB [facility]   12/31/2019  9:45 AM Rae Buitrago, OTR/L MMCPTPB [facility]   12/31/2019 10:30 AM Shanta Bee PTA MMCPTPB [facility] 12/31/2019 11:30 AM SILVERIO Melara MMCPTPB [address]   1/2/2020  9:45 AM Richi Monteiro HZICAVL [address]   1/2/2020 10:30 AM SILVERIO Melara [id number] [address]   1/30/2020  8:30 PM [address] SLEEP RM 4 MMCSL [address]   2/10/2020  8:30 AM Marleny Holbrook MD [address]   2/14/2020 10:15 AM Donna Becerril MD [facility]

## 2019-11-29 NOTE — TELEPHONE ENCOUNTER
Request for documentation rec'd from [facility] and scanned to chart.

## 2019-12-02 ENCOUNTER — HOSPITAL ENCOUNTER (OUTPATIENT)
Dept: PHYSICAL THERAPY | Age: 64
Discharge: HOME | End: 2019-12-02
Payer: MEDICARE

## 2019-12-02 PROCEDURE — 92526 ORAL FUNCTION THERAPY: CPT

## 2019-12-02 PROCEDURE — 97112 NEUROMUSCULAR REEDUCATION: CPT

## 2019-12-02 PROCEDURE — 92507 TX SP LANG VOICE COMM INDIV: CPT

## 2019-12-02 PROCEDURE — 97110 THERAPEUTIC EXERCISES: CPT

## 2019-12-02 PROCEDURE — 97032 APPL MODALITY 1+ESTIM EA 15: CPT

## 2019-12-02 NOTE — PROGRESS NOTES
OT DAILY TREATMENT NOTE 10-18    Patient Name: Levi Donaldson  Date:2019  : 1955  [x]  Patient  Verified  Payor: [place] MEDICARE / Plan: [place] MEDICARE PART A & B / Product Type: Medicare /    In time:230  Out time:320  Total Treatment Time (min): 50  Visit #: 6 of 12    Medicare/BCBS Only   Total Timed Codes (min):  45 1:1 Treatment Time:  45     Treatment Area: Upper extremity weakness [R29.898]  Cerebral infarction, unspecified [I63.9]    SUBJECTIVE  Pain Level (0-10 scale): 0/10  Any medication changes, allergies to medications, adverse drug reactions, diagnosis change, or new procedure performed?: [x] No    [] Yes (see summary sheet for update)  Subjective functional status/changes:   [] No changes reported  What can I do to make my hand work? I can put my shoe and AFO on now.   I got my bath wponge now and I can do that too    OBJECTIVE    Modality rationale: increase muscle contraction/control to improve the patients ability to use right UE   Min Type Additional Details    [] Estim:  []Unatt       []IFC  []Premod                        []Other:  []w/ice   []w/heat  Position:  Location:   15 [] Estim: []Att    []TENS instruct  [x]NMES                    []Other:  []w/US   []w/ice   []w/heat  Position:wrist extensors   Location:    []  Traction: [] Cervical       []Lumbar                       [] Prone          []Supine                       []Intermittent   []Continuous Lbs:  [] before manual  [] after manual    []  Ultrasound: []Continuous   [] Pulsed                           []1MHz   []3MHz W/cm2:  Location:    []  Iontophoresis with dexamethasone         Location: [] Take home patch   [] In clinic    []  Ice     []  heat  []  Ice massage  []  Laser   []  Anodyne Position:  Location:    []  Laser with stim  []  Other:  Position:  Location:    []  Vasopneumatic Device Pressure:       [] lo [] med [] hi   Temperature: [] lo [] med [] hi       [x] Skin assessment post-treatment:  [x]intact []redness- no adverse reaction    []redness - adverse reaction:      35 min Neuromuscular Re-education:  []  See flow sheet :   Rationale: increase ROM and increase strength  to improve the patients ability to move right arm  Right UE  Supine place and hold shoulder flexion 30 seconds x 2, able to make 2 circles small before loss of control  Sidelying elbow flex ext with tapping to increase ROM with minimal response  Seated elbow flexion x 10 to place arm on table  Seated elbow flex ext with matched resistance x 10  Weightbearing on right palm with push up to increase UE strength and reduce tone  Standing able to flex shoulder and elbow to place hand on counter x 10      With   [] TE   [] TA   [] neuro   [] other: Patient Education: [x] Review HEP    [] Progressed/Changed HEP based on:   [] positioning   [] body mechanics   [] transfers   [] heat/ice application   [] Splint wear/care   [] Sensory re-education   [] scar management      [] other: Effect of NMES on function, recovery process           Other Objective/Functional Measures:   Right UE Muscle testing  Shoulder flexion right 1/5  Horizontal adduction 2/5  Elbow flexion 1/5  Elbow Extension 3+/5  Wrist flexion 0/5  Wrist extension 0/5       Pain Level (0-10 scale) post treatment: 0/10    ASSESSMENT/Changes in Function: Patient has responded well to NMES with increased elbow contraction and improved wrist and digit extension with stimulation which should reduce atrophy and improve recovery of funciton. Patient will continue to benefit from skilled OT services to modify and progress therapeutic interventions, address ROM deficits, address strength deficits, analyze and address soft tissue restrictions, analyze and cue movement patterns and instruct in home and community integration to attain remaining goals.      []  See Plan of Care  []  See progress note/recertification  []  See Discharge Summary         Progress towards goals / Updated goals:  1.  Patient will be independent in home exercise program for AAROM of right UE to reduce level of assist for self care   Status at Eval/Last Progress Note: not addressed at eval  Status at Last Visit: Met 11/8/19     2.  Patient will be able to don right shoe with AFO with supervision. Status at Eval/Last Progress Note:  Current status:  met 12/2/19      3.  Patient will be familiar with task modification to allow him to bathe left side without assist.  Status at Eval/Last Progress Note: not addressed at eval  Current status;: Education on adaptive bathing techniques/strategies,ordered long handled sponge, met 11/18/19        Long Term Goals: To be accomplished in [address]  1.  Patient will be able to place right arm on table using active motion for use of right hand as assist  Status at Eval/Last Progress Note:  Current status: , met x 5 today 11/18/19, 11/22/19, progressing in standing with shoulder 11/25/19, met x 10 12/2/19     2.  Patient will be Modified independent for all self care tasks. Status at Eval/Last Progress Note: Not adressed  Current status::progressing, adaptive techniques for donning right sneaker given with demo 11/8/19, awaiting long handled sponge, still with min assist to get heel over AFO correctly 11/18/19, met 12/2/19     3.  Patient will be able to use right hand as stabilizing assist for self care activities.   Status at Eval/Last Progress Note: unable   Current status:  Able to hold wash cloth intermittently With Right hand 11/8/19, met with pill bottles 11/18/19     4.  Patient will report improved ability to use right hand to assist with home care as shown by reduced limitation of at least 20% on Neuro quality of Life.   Status at Eval/Last Progress Note: 82% limitation   Current status:Not reassessed       PLAN  []  Upgrade activities as tolerated     [x]  Continue plan of care  []  Update interventions per flow sheet       []  Discharge due to:_  []  Other:_      David Avila OTR/L 12/2/2019  3:42 PM    Future Appointments   Date Time Provider Marc Ruvalcaba   12/4/2019 10:30 AM Clemente Browne, PTA MMCPTPB [facility]   12/4/2019 11:00 AM Nancy Hayden FMDLBET [facility]   12/4/2019 12:00 PM SILVERIO Weaver TMECDKO [facility]   12/6/2019  1:15 PM Nancy Hayden AGOTIFB [facility]   12/6/2019  2:00 PM SILVERIO Stout MMCPTPB [facility]   12/9/2019  9:45 AM SILVERIO Weaver NMPIDWN [facility]   12/9/2019 10:30 AM Florentino Koo, PT MMCPTPB [facility]   12/9/2019 11:00 AM Nancy Hayden NIKNYKD [facility]   12/11/2019  9:30 AM Clemente Browne, PTA MMCPTPB [facility]   12/11/2019 10:00 AM ISLVERIO Stout VNSAPES [facility]   12/11/2019 11:00 AM Eleno Baltazar, OTR/L MMCPTPB [facility]   12/13/2019  9:45 AM SILVERIO Stout WUOMRNW [facility]   12/13/2019 10:30 AM Eleno Baltazar, OTR/L MMCPTPB [facility]   12/16/2019 12:00 PM Florentino Koo, PT MMCPTPB [facility]   12/16/2019  1:00 PM Nancy Hayden JMFNUYK [facility]   12/16/2019  1:45 PM SILVERIO Weaver ZUGNAUA [facility]   12/18/2019  2:00 PM Clemente Browne, PTA MMCPTPB [facility]   12/18/2019  2:45 PM SILVERIO Stout UTCCKQM [facility]   12/18/2019  3:30 PM Nancy Hayden LGSVWTH [facility]   12/20/2019  1:00 PM SILVERIO Weaver SQPVBDF [facility]   12/20/2019  2:00 PM Nancy Hayden MIHOIAW [facility]   12/23/2019  9:00 AM Clemente Browne, PTA MMCPTPB [facility]   12/23/2019  9:30 AM Nancy Catracho PIWRZFT [facility]   12/23/2019 10:30 AM SILVERIO Stout FDIAQNI [facility]   12/24/2019  9:00 AM Clemente Browne, PTA MMCPTPB [facility]   12/24/2019  9:30 AM Nancy Hayden SKKINJH [facility]   12/24/2019 10:15 AM SILVERIO Weaver KGEVVMC [facility]   12/27/2019 10:30 AM Eleno Baltazar, OTR/L PFTKFCX [facility]   12/27/2019 11:15 AM Agueda Vera SLP MMCPTPB [facility]   12/30/2019  9:45 AM SILVERIO Weaver PUGRMIA [facility]   12/30/2019 10:30 AM Clemente Browne, PTA MMCPTPB [facility]   12/30/2019 11:00 AM Eleno Baltazar OTR/L MMCPTPB [facility]   12/31/2019  9:45 AM MALLIKA Yu/ROVERTO MMCPTPB [facility]   12/31/2019 10:30 AM [facility]   12/31/2019 11:30 AM SILVERIO Villegas BFAYGCV [facility]   1/2/2020  9:45 AM Pete Pierce WAHINRE [facility]   1/2/2020 10:30 AM SILVERIO Villegas OLKMOUK [facility]   1/30/2020  8:30 PM [facility] SLEEP RM 4 MMCSL [facility]   2/10/2020  8:30 AM Willi Centeno MD [address]   2/14/2020 10:15 AM Ami Nguyen MD [facility]

## 2019-12-02 NOTE — PROGRESS NOTES
PT DAILY TREATMENT NOTE 10-18    Patient Name: Roverto Hunter  Date:2019  : 1955  [x]  Patient  Verified  Payor: [place] MEDICARE / Plan: [place] MEDICARE PART A & B / Product Type: Medicare /    In time:1258  Out time:130  Total Treatment Time (min): 32  Visit #: 2 of 12    Medicare/BCBS Only   Total Timed Codes (min):  32 1:1 Treatment Time:  32       Treatment Area: Lower extremity weakness [R29.898]  Cerebral infarction, unspecified [I63.9]    SUBJECTIVE  Pain Level (0-10 scale): 0/10  Any medication changes, allergies to medications, adverse drug reactions, diagnosis change, or new procedure performed?: [x] No    [] Yes (see summary sheet for update)  Subjective functional status/changes:   [] No changes reported  Pt stated that he is doing fine today and has no pain    OBJECTIVE    32 min Therapeutic Exercise:  [x] See flow sheet :   Rationale: increase ROM and increase strength to improve the patients ability to increase ease with ADLs    With   [x] TE   [] TA   [] neuro   [] other: Patient Education: [x] Review HEP    [] Progressed/Changed HEP based on:   [] positioning   [] body mechanics   [] transfers   [] heat/ice application    [] other:      Other Objective/Functional Measures:   Had some swaying with foam balance  No difficulty with increases made today  Had increased fatigue after standing exercises     Pain Level (0-10 scale) post treatment: 0/10    ASSESSMENT/Changes in Function:   Pt cont to slowly progress toward goals. Pt cont to use SBQC with walking. Cont with slow step to gait pattern. Static balance is improving.  Cont with decreased strength in right LE    Patient will continue to benefit from skilled PT services to modify and progress therapeutic interventions, address functional mobility deficits, address ROM deficits, address strength deficits, analyze and cue movement patterns, analyze and modify body mechanics/ergonomics and instruct in home and community integration to attain remaining goals. []  See Plan of Care  [x]  See progress note/recertification  []  See Discharge Summary         Progress towards goals / Updated goals:  1. Pt will ambulate 4 steps x 3 with 1 HR with supervision to be able to navigate stairs at home.                2. Pt will increase FOTO score by 7 pts to improve LE function.                3. Pt will increase right LE hip, quad and HS strength to 4/5 or greater to be able to navigate community distances.   Slowly progressing.  12/2/19                4. Pt will demonstrate Romberg EO/EC on a compliant surface to decrease fall risk during ADL's.                5. Pt will ambulate with LRAD >150 ft including an obstacle course to be able to navigate short community distances safely.     PLAN  []  Upgrade activities as tolerated     [x]  Continue plan of care  []  Update interventions per flow sheet       []  Discharge due to:_  []  Other:_      Andres Aguillon PTA 12/2/2019  12:43 PM    Future Appointments   Date Time Provider Marc Mirelesi   12/2/2019  1:00 PM Joseph Lutz PTA MMCPTPB [facility]   12/2/2019  1:45 PM SILVERIO Jordan MMCPTPB [facility]   12/2/2019  2:30 PM MALLIKA Montgomery/ROVERTO MMCPTPB [facility]   12/4/2019 10:30 AM Joseph Lutz PTA MMCPTPB [facility]   12/4/2019 11:00 AM Steffi WEAVER [facility]   12/4/2019 12:00 PM SILVERIO Jordan MMCPTPB [facility]   12/6/2019  1:15 PM Steffi DENSON [facility]   12/6/2019  2:00 PM SILVERIO Jordan MMCPTPB [facility]   12/9/2019  9:45 AM SILVERIO Jordan GLYZEIV [facility]   12/9/2019 10:30 AM Megan Marion, PT MMCPTPB [facility]   12/9/2019 11:00 AM Steffi Pearson SALLY [facility]   12/11/2019  9:30 AM Joseph Lutz, LINDA MMCPTPB [facility]   12/11/2019 10:00 AM Dale Monteiro I, SLP MMCPTPB [facility]   12/11/2019 11:00 AM Denis Patten, OTR/L [id number] [facility]   12/13/2019  9:45 AM Tiffanie Sanchez, SLP PSVUBZM [facility]   12/13/2019 10:30 AM Denis Patten, OTR/L MMCPTPB [facility]   12/16/2019 12:00 PM Megan Marion, PT MMCPTPB [facility] 12/16/2019  1:00 PM [facility] QRZNNAQ [facility]   12/16/2019  1:45 PM Ariane Webb, SLP AMQQFTP [facility]   12/18/2019  2:00 PM Radu Anne, PTA MMCPTPB [facility]   12/18/2019  2:45 PM SILVERIO Chinchilla FKFGTFV [facility]   12/18/2019  3:30 PM [facility] FZFWLCW [facility]   12/20/2019  1:00 PM Ariane Webb, SLP VVZNIXK [facility]   12/20/2019  2:00 PM [facility] QBQKOMI [facility]   12/23/2019  9:00 AM Radu Anne, PTA MMCPTPB [facility]   12/23/2019  9:30 AM [facility] GEQFYBC [facility]   12/23/2019 10:30 AM SILVERIO Rivera DHSXDBS [facility]   12/24/2019  9:00 AM Radu Anne, PTA MMCPTPB [facility]   12/24/2019  9:30 AM [facility] ORVZWSD [facility]   12/24/2019 10:15 AM Ariane Webb, SLP YSIRQVX [facility]   12/27/2019 10:30 AM Hafsa Newton, OTR/L [id number] [facility]   12/27/2019 11:15 AM Garcia Ramos SLP MMCPTPB [facility]   12/30/2019  9:45 AM SILVERIO Chinchilla LEAYIAR [facility]   12/30/2019 10:30 AM Radu Anne, PTA MMCPTPB [facility]   12/30/2019 11:00 AM Cherre Eth, OTR/L MMCPTPB [facility]   12/31/2019  9:45 AM Cherre Eth, OTR/L MMCPTPB [facility]   12/31/2019 10:30 AM Suellenne Omid, PTA MMCPTPB [facility]   12/31/2019 11:30 AM Eduin Lieu I, SLP [id number] [facility]   1/2/2020  9:45 AM [facility] HMREODW [facility]   1/2/2020 10:30 AM Eduinnatividad Victoru I, SLP MMCPTPB [facility]   1/30/2020  8:30 PM [facility] SLEEP RM 4 MMCSL [facility]   2/10/2020  8:30 AM Juana Gurrola  [address]   2/14/2020 10:15 AM Seth Waterman Cera, MD [address] [facility]

## 2019-12-02 NOTE — PROGRESS NOTES
ST DAILY TREATMENT NOTE    Patient Name: Nivia Lot  Date:2019  : 1955  [x]  Patient  Verified  Payor: Payor: [place] MEDICARE / Plan: [place] MEDICARE PART A & B / Product Type: Medicare /   In time: 1:47  Out time: 2:30   Total Treatment Time (min): 43  Visit #: 14 of 24    Treatment Diagnosis: Dysarthria and anarthria [R47.1]    SUBJECTIVE  Pain Level (0-10 scale): 0  Any medication changes, allergies to medications, adverse drug reactions, diagnosis change, or new procedure performed?: [x] No    [] Yes (see summary sheet for update)    Subjective functional status/changes:   [x] No changes reported  Pt reports compliance with HEP. OBJECTIVE  Treatment provided includes:  Increase/Improve:  []  Voice Quality []  Cognitive Linguistic Skills [x]  Laryngeal/Pharyngeal Exercises   []  Vocal Loudness []  Reading Comprehension [x]  Swallowing Skills    []  Vocal Cord Function []  Auditory Comprehension []  Oral Motor Skills   []  Resonance []  Writing Skills [x]  Compensatory strategies    [x]  Speech Intelligibility []  Expressive Language []  Attention   [x]  Breath Support/Coord.  []  Receptive language []  Memory   []  Articulation []  Safety Awareness [x] pt education   []  Fluency []  Word Retrieval []        Treatment Provided:  - pt education/review  for compensatory strategies to increase speech intelligibility   - diaphragmatic breathing in vowel prolongations with skilled cueing to increase precision, vocal quality, and duration   - /s/ productions in phrases ; /s,z/ minimal pair combinations with skilled cueing to increase articulatory precision / accuracy   - trials of thin liquids and solids utilizing compensatory techniques to increase safety for oral intake  - TBR/laryngeal/pharyngeal strengthening exercises to increase efficiency and strength of oropharyngeal swallow with skilled training     Patient/Caregiver  Education: [x] Review HEP      HEP/Handouts given: continue oropharyngeal strengthening program established     Pain Level (0-10 scale) post treatment: 0    ASSESSMENT   Pt progressing in independence in completion of oropharyngeal strengthening exercises, recall of compensatory strategies, and diaphragmatic breathing   []   Improving appropriately and progressing toward goals  [x]   Improving slowly and progressing toward goals  []   Approximating goals/maximum potential  [x]   Continues to benefit from skilled therapy to address remaining functional deficits  []   Not progressing toward goals and plan of care will be adjusted    Patient will continue to benefit from skilled therapy to address remaining functional deficits: dysphagia, dysarthria     Progress towards goals / Updated goals:  1. The pt will increase quality and length of phonation by sustaining ah utilizing effective diaphragmatic breath support for >10 seconds in 3/3 sessions to increase functional speech intelligibility. Current 12/2/19: progressing x2/3 sessions met;  x5 trials (1) ~11 sec, (2)~ 11 sec, (3) ~10 sec (4) ~12 sec (5) ~12 sec   2. Pt will demonstrate ability to produce improve prosody, rhyme, rhythm in structured sentences, word emphasis tasks, and varying pitch phrases using various comp strategies with 80% acc given min-mod cues in 3/3 opportunities to improve naturalness of speech. Current 12/2/19:   Not addressed this date   3. The patient will articulate (s/z) consonants at the word level- phrases level with 90% acc with use of comp strategies and OMES  to improve speech intelligibility to > 90% acc when provided with min cues. Current 12/2/19:  /s/ in phrases with Michael : initial: 90% , medial: 75% , final: 95% ; /s,z/ minimal pairs with 90% accuracy with Michael   4. The patient will articulate  (l, voiced /th/ consonants at the word level- phrases level  with 90% acc with use of comp strategies and OMES  to improve speech intelligibility to > 90% acc when provided with min cues.   Current 12/2/19: not addressed this date ; prior: 11/26/19:  /l/ in phrases initial position 80% with Michael, medial 100% with Michael, final 90% with Michael  5. Patient will complete tongue base retraction,and laryngeal/pharyngeal strengthening exercises (including Sheyla maneuver, Mendelsohn maneuver, modified Shaker with CTAR ball, hard /k/ in isolation,  effortful swallow, etc.), with min cues in 5/5 sessions in order to improve the strength/agility/efficiency of his swallow for improved swallowing safety and QOL.    Current 12/2/19: supraglottic swallow x10 with modA; mendelsohn maneuver x15 with ~3-4 sec holds with Michael for increased precision, hard /k/ x56 x2 sets with Michael    6. Patient will recall/demonstrate aspiration precautions and safe swallowing strategies with 100% acc when provided with occasional cues in 5/5 sessions (small sips/bites; chin tuck with all liquid intake; alternate liquids/solids; slow rate; NO straws, Sit upright at 90 degree angle during PO trials)  to decrease the reported incidences of dysphagia and s/sx of aspiration.   Current 12/2/19: recalls compensatory strategies x100% accuracy with Michael ; utilized with po with Michael   7. Patient will tolerate trials of thin liquids (>4 ounces) using compensatory strategies without s/sx of aspiration/penetration when provided with Michael across 5/5 sessions. Current 12/2/19: ~4 oz without s/sx across session, cannot rule out silent aspiration. x2 cues to tuck chin (x4/5 sessions met)     8. Patient will tolerate regular solids with utilization of compensatory strategies without s/sx of aspiration/penetration or distress with Michael across 5/5 sessions.   Current 12/2/19:  x100% of trials no overt s/sx of aspiration noted with Michael- mild pocketing and statis (3/5 sessions met)     PLAN  [x]  Continue plan of care  []  Modify Goals/Treatment Plan      []  Discharge due to:  [] Other:    Whitney Martin SLP 12/2/2019  1:24 PM    Future Appointments   Date Time Provider Marc Ruvalcaba   12/2/2019  1:45 PM Kiki Miller, SLP MMCPTPB [facility]   12/2/2019  2:30 PM Eleno Baltazar, OTR/L MMCPTPB [facility]   12/4/2019 10:30 AM Clemente Browne, PTA MMCPTPB [facility]   12/4/2019 11:00 AM Nancy Hayden HYJVVCH [facility]   12/4/2019 12:00 PM Kiki Miller, SLP QNYJTMU [facility]   12/6/2019  1:15 PM Nancy Hayden OYKKMBM [facility]   12/6/2019  2:00 PM Anyi Pham I, SLP MMCPTPB [facility]   12/9/2019  9:45 AM Kiki Miller, SLP CWLGQEU [facility]   12/9/2019 10:30 AM Florentino Koo, PT MMCPTPB [facility]   12/9/2019 11:00 AM Nancy Hayden BRLPMGA [facility]   12/11/2019  9:30 AM Clemente Browne, PTA MMCPTPB [facility]   12/11/2019 10:00 AM Anyi Pham I, SLP [id number] [facility]   12/11/2019 11:00 AM Eleno Baltazar, OTR/L MMCPTPB [facility]   12/13/2019  9:45 AM Anyi Pham I, SLP EGKLYNC [facility]   12/13/2019 10:30 AM Eleno Baltazar, OTR/L MMCPTPB [facility]   12/16/2019 12:00 PM Florentino Koo, PT MMCPTPB [facility]   12/16/2019  1:00 PM Nancydoretha Murrya ZSKUCSZ [facility]   12/16/2019  1:45 PM SILVERIO Weaver NGEWUDG [facility]   12/18/2019  2:00 PM Clemente Browne, PTA MMCPTPB [facility]   12/18/2019  2:45 PM SILVERIO Stout FASDNQL [facility]   12/18/2019  3:30 PM Nancy Hayden TNAZFQL [facility]   12/20/2019  1:00 PM SILVERIO Weaver XXXDDQB [facility]   12/20/2019  2:00 PM Nancy Murrya NAVSRBZ [facility]   12/23/2019  9:00 AM Clemente Browne, PTA MMCPTPB [facility]   12/23/2019  9:30 AM Nancy Hayden SMROVBG [facility]   12/23/2019 10:30 AM SILVERIO Stout WFCRAHJ [facility]   12/24/2019  9:00 AM Clemente Browne, PTA MMCPTPB [facility]   12/24/2019  9:30 AM Nancy Catracho HSYWPCV [facility]   12/24/2019 10:15 AM SILVERIO Weaver KHLEQFX [facility]   12/27/2019 10:30 AM Eleno Baltazar OTR/ROVERTO UMIEMQV [facility]   12/27/2019 11:15 AM SILVERIO Lancaster MMCPTPB [facility]   12/30/2019  9:45 AM SILVERIO Weaver XNJOPHF [facility]   12/30/2019 10:30 AM Clemente Browne, PTA MMCPTPB [facility]   12/30/2019 11:00 AM Eleno Baltazar, OTR/L MMCPTPB [facility]   12/31/2019  9:45 AM Eleno Baltazar, OTR/L MMCPTPB [facility]   12/31/2019 10:30 AM Ady Blackwood, PTA MMCPTPB [facility]   12/31/2019 11:30 AM SILVERIO Wiley SFZYXYJ [facility]   1/2/2020  9:45 AM Severa Saba THMFVTL [facility]   1/2/2020 10:30 AM SILVERIO Wiley MMCPTPB [facility]   1/30/2020  8:30 PM [facility] SLEEP RM 4 MMCSL [facility]   2/10/2020  8:30 AM Alyssia Covarrubias MD [address]   2/14/2020 10:15 AM Barby Clark MD [facility]

## 2019-12-04 ENCOUNTER — HOSPITAL ENCOUNTER (OUTPATIENT)
Dept: PHYSICAL THERAPY | Age: 64
Discharge: HOME | End: 2019-12-04
Payer: MEDICARE

## 2019-12-04 PROCEDURE — 97110 THERAPEUTIC EXERCISES: CPT

## 2019-12-04 PROCEDURE — 97112 NEUROMUSCULAR REEDUCATION: CPT

## 2019-12-04 PROCEDURE — 92526 ORAL FUNCTION THERAPY: CPT

## 2019-12-04 PROCEDURE — 92507 TX SP LANG VOICE COMM INDIV: CPT

## 2019-12-04 PROCEDURE — 97032 APPL MODALITY 1+ESTIM EA 15: CPT

## 2019-12-04 NOTE — PROGRESS NOTES
PT DAILY TREATMENT NOTE 10-18    Patient Name: Karen Triplett  Date:2019  : 1955  [x]  Patient  Verified  Payor: [place] MEDICARE / Plan: [place] MEDICARE PART A & B / Product Type: Medicare /    In time:1028  Out time:1100  Total Treatment Time (min): 32  Visit #: 2 of 12    Medicare/BCBS Only   Total Timed Codes (min):  32 1:1 Treatment Time:  32       Treatment Area: Lower extremity weakness [R29.898]  Cerebral infarction, unspecified [I63.9]    SUBJECTIVE  Pain Level (0-10 scale): 0/10  Any medication changes, allergies to medications, adverse drug reactions, diagnosis change, or new procedure performed?: [x] No    [] Yes (see summary sheet for update)  Subjective functional status/changes:   [] No changes reported  Pt stated that he feels that walking is getting a little easier    OBJECTIVE    32 min Therapeutic Exercise:  [x] See flow sheet :   Rationale: increase ROM and increase strength to improve the patients ability to increase ease with ADLs    With   [x] TE   [] TA   [] neuro   [] other: Patient Education: [x] Review HEP    [] Progressed/Changed HEP based on:   [] positioning   [] body mechanics   [] transfers   [] heat/ice application    [] other:      Other Objective/Functional Measures:   Had no difficulty with exercises  All exercises were performed much better today 2* less fatigue  Static balance is improving     Pain Level (0-10 scale) post treatment: 0/10    ASSESSMENT/Changes in Function:   Pt is slowly progressing toward goals. Pt cont to ambulate with SBQC. Cont to be slow with ambulation. Strength in B LE is slowly improving.      Patient will continue to benefit from skilled PT services to modify and progress therapeutic interventions, address functional mobility deficits, address ROM deficits, address strength deficits, analyze and cue movement patterns, analyze and modify body mechanics/ergonomics, assess and modify postural abnormalities, address imbalance/dizziness and instruct in home and community integration to attain remaining goals. []  See Plan of Care  [x]  See progress note/recertification  []  See Discharge Summary         Progress towards goals / Updated goals:  1. Pt will ambulate 4 steps x 3 with 1 HR with supervision to be able to navigate stairs at home.                2. Pt will increase FOTO score by 7 pts to improve LE function.                3. Pt will increase right LE hip, quad and HS strength to 4/5 or greater to be able to navigate community distances.   Slowly progressing.  12/2/19                4. Pt will demonstrate Romberg EO/EC on a compliant surface to decrease fall risk during ADL's.                5. Pt will ambulate with LRAD >150 ft including an obstacle course to be able to navigate short community distances safely.     PLAN  []  Upgrade activities as tolerated     [x]  Continue plan of care  []  Update interventions per flow sheet       []  Discharge due to:_  []  Other:_      Jesi Cote PTA 12/4/2019  10:34 AM    Future Appointments   Date Time Provider Marc Ruvalcaba   12/4/2019 11:00 AM Richi Monteiro ARDKHCK [facility]   12/4/2019 12:00 PM Gema Burger SLP MMCPTPB [facility]   12/6/2019  1:15 PM Richi Monteiro UIGXNGJ [facility]   12/6/2019  2:00 PM SILVERIO Oliveira MMCPTPB [facility]   12/9/2019  9:45 AM SILVERIO Oliveira QKNZFIS [facility]   12/9/2019 10:30 AM Ramsey Booth, PT MMCPTPB [facility]   12/9/2019 11:00 AM Richi Monteiro PTDFHVF [facility]   12/11/2019  9:30 AM Sally Kumar PTA MMCPTPB [facility]   12/11/2019 10:00 AM SILVERIO Melara ETXWYDO [facility]   12/11/2019 11:00 AM Jose F Jewell, OTR/L MMCPTPB [facility]   12/13/2019  9:45 AM Gema Burger, SLP [id number] [facility]   12/13/2019 10:30 AM Jose F Jewell, OTR/L MMCPTPB [facility]   12/16/2019 12:00 PM Ramsey Booth, PT MMCPTPB [facility]   12/16/2019  1:00 PM Richi TURNER [facility]   12/16/2019  1:45 PM Gema Burger, SLP VOZFQRC [facility]   12/18/2019  2:00 PM Sally Kumar, PTA MMCPTPB [facility]   12/18/2019 2:45 PM SILVERIO Mina MMCPTPB [facility]   12/18/2019  3:30 PM Bryon Quinonez RJVXLIR [facility]   12/20/2019  1:00 PM SILVERIO Mina VYMJOFS [facility]   12/20/2019  2:00 PM Bryon Quinonez LOMBRFE [facility]   12/23/2019  9:00 AM Aleida Gonzalez, PTA MMCPTPB [facility]   12/23/2019  9:30 AM Bryon Quinonez FIMERWL [facility]   12/23/2019 10:30 AM Eli Diana I SLP YCJZQYS [facility]   12/24/2019  9:00 AM Aleida Gonzalez PTA MMCPTPB [facility]   12/24/2019  9:30 AM Bryon Quinonez JQZBBBH [facility]   12/24/2019 10:15 AM SILVERIO Mina PIRJBSR [facility]   12/27/2019 10:30 AM Ellie Downs, OTR/L MMCPTPB [facility]   12/27/2019 11:15 AM SILVERIO Rod MMCPTPB [facility]   12/30/2019  9:45 AM SILVERIO Mina HCTLYXA [facility]   12/30/2019 10:30 AM Aleida Gonzalez, PTA MMCPTPB [facility]   12/30/2019 11:00 AM Ellie Downs, OTR/L MMCPTPB [facility]   12/31/2019  9:45 AM Ellie Downs, OTR/L MMCPTPB [facility]   12/31/2019 10:30 AM Aleida Gonzalez, PTA MMCPTPB [facility]   12/31/2019 11:30 AM SILVERIO Jimenez KVHJVBI [facility]   1/2/2020  9:45 AM Bryon Quinonez ILPZGUJ [facility]   1/2/2020 10:30 AM SILVERIO Jimenez MMCPTPB [facility]   1/30/2020  8:30 PM [facility] SLEEP RM 4 MMCSL [facility]   2/10/2020  8:30 AM Job Degree, MD [address]   2/14/2020 10:15 AM Darya Johnson MD [id number] [facility]

## 2019-12-04 NOTE — PROGRESS NOTES
OT DAILY TREATMENT NOTE 10-18    Patient Name: Evie Ball  Date:2019  : 1955  [x]  Patient  Verified  Payor: [place] MEDICARE / Plan: [place] MEDICARE PART A & B / Product Type: Medicare /    In time:1100  Out time:1145  Total Treatment Time (min): 45  Visit #: 7 of 12    Medicare/BCBS Only   Total Timed Codes (min):  45 1:1 Treatment Time:  45     Treatment Area: Upper extremity weakness [R29.898]  Cerebral infarction, unspecified [I63.9]    SUBJECTIVE  Pain Level (0-10 scale): 0/10  Any medication changes, allergies to medications, adverse drug reactions, diagnosis change, or new procedure performed?: [x] No    [] Yes (see summary sheet for update)  Subjective functional status/changes:   [] No changes reported  My bruises healed up nicely  I am using my bath spong    OBJECTIVE  Modality rationale: increase muscle contraction/control to improve the patients ability to use right UE   Min Type Additional Details      []? Estim:  []? Unatt       []? IFC  []? Premod                        []?Other:  []?w/ice   []?w/heat  Position:  Location:    15 []? Estim: []? Att    []? TENS instruct  [x]? NMES                    []?Other:  []?w/US   []?w/ice   []?w/heat  Position:wrist extensors   Location:      []? Traction: []? Cervical       []? Lumbar                       []? Prone          []? Supine                       []?Intermittent   []? Continuous Lbs:  []? before manual  []? after manual      []? Ultrasound: []? Continuous   []? Pulsed                           []? 1MHz   []? 3MHz W/cm2:  Location:      []? Iontophoresis with dexamethasone         Location: []? Take home patch   []? In clinic      []? Ice     []?  heat  []? Ice massage  []? Laser   []? Anodyne Position:  Location:      []? Laser with stim  []? Other:  Position:  Location:      []? Vasopneumatic Device Pressure:       []? lo []? med []? hi   Temperature: []? lo []? med []? hi       [x]? Skin assessment post-treatment:  [x]? intact []? redness- no adverse reaction  []? redness - adverse reaction:     30 min Neuromuscular Re-education:  []  See flow sheet :   Rationale: increase ROM and increase strength  to improve the patients ability to move right arm     Seated:   Floor Reach   Retraction/Elevation    Elbow flexion/extension with matched resistance    Weight bearing pushups onto right palm to increase UE strength and reduce tone   Hand lap to table: 5x with assist required     Supine:    Place and hold Right shoulder flexion with elbow extension      Sidelying:   Elbow Flexion/extension with tapping to increase ROM    In Stand:   Hand to counter 2x10           With   [] TE   [] TA   [] neuro   [] other: Patient Education: [x] Review HEP    [] Progressed/Changed HEP based on:   [] positioning   [] body mechanics   [] transfers   [] heat/ice application   [] Splint wear/care   [] Sensory re-education   [] scar management      [] other:            Other Objective/Functional Measures:     Place and hold: max time 29 seconds with verbal cueing to breathe required      Pain Level (0-10 scale) post treatment: 0/10    ASSESSMENT/Changes in Function: Right shoulder strength improving     Patient will continue to benefit from skilled OT services to modify and progress therapeutic interventions, address ROM deficits, address strength deficits and instruct in home and community integration to attain remaining goals. []  See Plan of Care  []  See progress note/recertification  []  See Discharge Summary         Progress towards goals / Updated goals:  1.  Patient will be independent in home exercise program for AAROM of right UE to reduce level of assist for self care   Status at Eval/Last Progress Note: not addressed at eval  Status at Last Visit: Met 11/8/19     2.  Patient will be able to don right shoe with AFO with supervision.   Status at Eval/Last Progress Note:  Current status:  met 12/2/19      3.  Patient will be familiar with task modification to allow him to bathe left side without assist.  Status at Eval/Last Progress Note: not addressed at eval  Current status;: Education on adaptive bathing techniques/strategies,ordered long handled sponge, met 11/18/19        Long Term Goals: To be accomplished in 8 weeks:              1.  Patient will be able to place right arm on table using active motion for use of right hand as assist  Status at Eval/Last Progress Note:  Current status: , met x 5 today 11/18/19, 11/22/19, progressing in standing with shoulder 11/25/19, met x 10 12/2/19 met 2x10 12/4/19     2.  Patient will be Modified independent for all self care tasks. Status at Eval/Last Progress Note: Not adressed  Current status::progressing, adaptive techniques for donning right sneaker given with demo 11/8/19, awaiting long handled sponge, still with min assist to get heel over AFO correctly 11/18/19, met 12/2/19     3.  Patient will be able to use right hand as stabilizing assist for self care activities.   Status at Eval/Last Progress Note: unable   Current status:  Able to hold wash cloth intermittently With Right hand 11/8/19, met with pill bottles 11/18/19     4.  Patient will report improved ability to use right hand to assist with home care as shown by reduced limitation of at least 20% on Neuro quality of Life.   Status at Eval/Last Progress Note: 82% limitation   Current status:Not reassessed    PLAN  []  Upgrade activities as tolerated     [x]  Continue plan of care  []  Update interventions per flow sheet       []  Discharge due to:_  []  Other:_      JUSTIN Velasco/L 12/4/2019  9:22 AM    Future Appointments   Date Time Provider Marc Ruvalcaba   12/4/2019 10:30 AM Sudha Muñoz PTA MMCPTPB [facility]   12/4/2019 11:00 AM Dominic Turner TCENDAI [facility]   12/4/2019 12:00 PM SILVERIO Paredes XJBUIUL [facility]   12/6/2019  1:15 PM Dominic Turner FBCNRZR [facility]   12/6/2019  2:00 PM Carlee Moreira, SILVERIO MMCPTPB [facility]   12/9/2019  9:45 AM SILVERIO Rossi MMCPTPB [facility]   12/9/2019 10:30 AM Norberto Gonzalez, PT MMCPTPB [facility]   12/9/2019 11:00 AM Kathleen Esquivel SUNAFOH [facility]   12/11/2019  9:30 AM Dee Socks, PTA MMCPTPB [facility]   12/11/2019 10:00 AM Ishan Beyer I, SILVERIO FQYVIHJ [facility]   12/11/2019 11:00 AM Arlette Eric, OTR/L MMCPTPB [facility]   12/13/2019  9:45 AM SILVERIO Sandoval OBFERPX [facility]   12/13/2019 10:30 AM Arlette Eric, OTR/L MMCPTPB [facility]   12/16/2019 12:00 PM Norberto Gonzalez, PT MMCPTPB [facility]   12/16/2019  1:00 PM Kathleen Esquivel ULUGCFO [facility]   12/16/2019  1:45 PM SILVERIO Rossi WKGHHXN [facility]   12/18/2019  2:00 PM Dee Socks, PTA MMCPTPB [facility]   12/18/2019  2:45 PM SILVERIO Rossi INWEDKR [facility]   12/18/2019  3:30 PM Kathleen Esquivel VPMDKDF [facility]   12/20/2019  1:00 PM SILVERIO Rossi GHSXTDZ [facility]   12/20/2019  2:00 PM Kathleen Esquivel XPLLIBN [facility]   12/23/2019  9:00 AM Dee Socks, PTA MMCPTPB [facility]   12/23/2019  9:30 AM Kathleen Esquivel OGNOANF [facility]   12/23/2019 10:30 AM Ishan Beyer I, SILVERIO MJOVKKG [facility]   12/24/2019  9:00 AM Dee Socks, PTA MMCPTPB [facility]   12/24/2019  9:30 AM Kathleen Esquivel XMBSEUY [facility]   12/24/2019 10:15 AM SILVERIO Rossi PFYUKQS [facility]   12/27/2019 10:30 AM Arlette Eric, OTR/L MMCPTPB [facility]   12/27/2019 11:15 AM SILVERIO Carrillo MMCPTPB [facility]   12/30/2019  9:45 AM SILVERIO Rossi VWDHZSK [facility]   12/30/2019 10:30 AM Dee Socks, PTA MMCPTPB [facility]   12/30/2019 11:00 AM Arlette Eric, OTR/L MMCPTPB [facility]   12/31/2019  9:45 AM Arlette Eric, OTR/L MMCPTPB [facility]   12/31/2019 10:30 AM Dee Socks, PTA MMCPTPB [facility]   12/31/2019 11:30 AM SILVERIO Sandoval EQOODIN [facility]   1/2/2020  9:45 AM Kathleen Esquivel ISIANPC [facility]   1/2/2020 10:30 AM Ishan Beyer I SLP MMCPTPB [facility]   1/30/2020  8:30 PM [facility] SLEEP RM 4 MMCSL [facility]   2/10/2020  8:30 AM Olga Nevarez MD [address]   2/14/2020 10:15 AM Riley Rod MD [place]

## 2019-12-04 NOTE — PROGRESS NOTES
ST DAILY TREATMENT NOTE    Patient Name: Lila Trivedi  Date:2019  : 1955  [x]  Patient  Verified  Payor: Payor: [place] MEDICARE / Plan: [place] MEDICARE PART A & B / Product Type: Medicare /   In time: 12:05  Out time: 12:45  Total Treatment Time (min): 45  Visit #: 15 of 24    Treatment Diagnosis: Dysarthria and anarthria [R47.1]    SUBJECTIVE  Pain Level (0-10 scale): 0  Any medication changes, allergies to medications, adverse drug reactions, diagnosis change, or new procedure performed?: [x] No    [] Yes (see summary sheet for update)    Subjective functional status/changes:   [x] No changes reported    OBJECTIVE  Treatment provided includes:  Increase/Improve:  []  Voice Quality []  Cognitive Linguistic Skills [x]  Laryngeal/Pharyngeal Exercises   []  Vocal Loudness []  Reading Comprehension [x]  Swallowing Skills    []  Vocal Cord Function []  Auditory Comprehension []  Oral Motor Skills   []  Resonance []  Writing Skills [x]  Compensatory strategies    [x]  Speech Intelligibility []  Expressive Language []  Attention   [x]  Breath Support/Coord.  []  Receptive language []  Memory   [x]  Articulation []  Safety Awareness []    []  Fluency []  Word Retrieval []        Treatment Provided:  - pharyngeal/laryngeal/TBR strengthening exercises with skilled cueing to increase precision for overall strength and endurance of oropharyngeal mechanisms   - pt education/review regarding compensatory techniques to decrease occurrence of dysphagia and increase safety   - diaphragmatic breathing exercises for sustained vowels to increase breath support/coordination   - /l/ productions in phrases and short sentences to increase speech intelligibility and articulatory precision    - prosody in sentences with skilled training     Patient/Caregiver  Education: [x] Review HEP      HEP/Handouts given: continue HEP established including oropharyngeal strengthening exercises and /s/ productions with handout     Pain Level (0-10 scale) post treatment: 0    ASSESSMENT   Met x2 STGs this date. Progressing towards additional goals addressed      []   Improving appropriately and progressing toward goals  [x]   Improving slowly and progressing toward goals  []   Approximating goals/maximum potential  [x]   Continues to benefit from skilled therapy to address remaining functional deficits  []   Not progressing toward goals and plan of care will be adjusted    Patient will continue to benefit from skilled therapy to address remaining functional deficits: dysarthria and dysphagia    Progress towards goals / Updated goals:  1. The pt will increase quality and length of phonation by sustaining ah utilizing effective diaphragmatic breath support for >10 seconds in 3/3 sessions to increase functional speech intelligibility. Current 12/4/19: Met: x3/3 sessions met;  x5 trials (1) ~11 sec, (2) ~10 sec, (3) ~13 sec (4) ~12 sec (5) ~11 sec   2. Pt will demonstrate ability to produce improve prosody, rhyme, rhythm in structured sentences, word emphasis tasks, and varying pitch phrases using various comp strategies with 80% acc given min-mod cues in 3/3 opportunities to improve naturalness of speech. Current 12/4/19:  85% accuracy with min-modA x2/3 sessions   3. The patient will articulate (s/z) consonants at the word level- phrases level with 90% acc with use of comp strategies and OMES  to improve speech intelligibility to > 90% acc when provided with min cues. Current 12/4/19: not addressed this date ; prior 12/2/19:  /s/ in phrases with Michael : initial: 90% , medial: 75% , final: 95% ; /s,z/ minimal pairs with 90% accuracy with Michael   4. The patient will articulate  (l, voiced /th/ consonants at the word level- phrases level  with 90% acc with use of comp strategies and OMES  to improve speech intelligibility to > 90% acc when provided with min cues.   Current 12/4/19: /l/ in phrases initial position 90% with Michael, medial 100% with Michael, final 90% with Michael  5. Patient will complete tongue base retraction,and laryngeal/pharyngeal strengthening exercises (including Sheyla maneuver, Mendelsohn maneuver, modified Shaker with CTAR ball, hard /k/ in isolation,  effortful swallow, etc.), with min cues in 5/5 sessions in order to improve the strength/agility/efficiency of his swallow for improved swallowing safety and QOL.    Current 12/4/19: modified shaker with CTAR ball x7 extended 60 sec holds, x25 5 sec holds with Michael to increase precision, effortful swallow x15 with Michael   6. Patient will recall/demonstrate aspiration precautions and safe swallowing strategies with 100% acc when provided with occasional cues in 5/5 sessions (small sips/bites; chin tuck with all liquid intake; alternate liquids/solids; slow rate; NO straws, Sit upright at 90 degree angle during PO trials)  to decrease the reported incidences of dysphagia and s/sx of aspiration.   Current 12/4/19: recalls compensatory strategies x100% accuracy with Michael ; utilized with po with Michael x1 cue for chin tuck  7. Patient will tolerate trials of thin liquids (>4 ounces) using compensatory strategies without s/sx of aspiration/penetration when provided with Michael across 5/5 sessions. Current 12/4/19: Met: ~4 oz without s/sx across session, cannot rule out silent aspiration. x1 cue to tuck chin (x5/5 sessions met)     8. Patient will tolerate regular solids with utilization of compensatory strategies without s/sx of aspiration/penetration or distress with Michael across 5/5 sessions.   Current 12/4/19: not addressed this date (x3/5 sessions met)     PLAN  [x]  Continue plan of care  []  Modify Goals/Treatment Plan      []  Discharge due to:  [] Other:    SILVERIO Kurtz 12/4/2019  12:06 PM    Future Appointments   Date Time Provider Marc Ruvalcaba   12/6/2019  1:15 PM Lainey Cardenas JOZLXJC [facility]   12/6/2019  2:00 PM SILVERIO Mccann GDXIXZU [facility]   12/9/2019  9:45 AM Scout Diego SLP MMCPTPB [facility]   12/9/2019 10:30 AM Raenette Dover, PT MMCPTPB [facility]   12/9/2019 11:00 AM Rice Erb IFTBPBX [facility]   12/11/2019  9:30 AM Luisito Cisneros, PTA MMCPTPB [facility]   12/11/2019 10:00 AM Marcell Worley I, SLP OEITHUA [facility]   12/11/2019 11:00 AM Nataly Eis, OTR/L MMCPTPB [facility]   12/13/2019  9:45 AM SILVERIO Mcdermott TAJBPHZ [facility]   12/13/2019 10:30 AM Nataly Eis, OTR/L MMCPTPB [facility]   12/16/2019 12:00 PM Jaylan Hernandez, PT MMCPTPB [facility]   12/16/2019  1:00 PM Rice Savanna JRRKKCN [facility]   12/16/2019  1:45 PM SILVERIO Mcdermott ISHZYDY [facility]   12/18/2019  2:00 PM Luisito Cisneros, PTA MMCPTPB [facility]   12/18/2019  2:45 PM SILVERIO Mcdermott RVNXAAL [facility]   12/18/2019  3:30 PM Rice Savanna QKFLMKP [facility]   12/20/2019  1:00 PM SILVERIO Mcdermott UQGPNKL [facility]   12/20/2019  2:00 PM Rice Erb TIMITIZ [facility]   12/23/2019  9:00 AM Luisito Cisneros, PTA MMCPTPB [facility]   12/23/2019  9:30 AM Rice Erb OOCUTIK [facility]   12/23/2019 10:30 AM Marcell Worley I, SILVERIO PSYJIXI [facility]   12/24/2019  9:00 AM Luisito Cisneros, PTA MMCPTPB [facility]   12/24/2019  9:30 AM Roe Corrales GDYSPHB [facility]   12/24/2019 10:15 AM SILVERIO Mcdermott BSLNDFQ [facility]   12/27/2019 10:30 AM Nataly Eis, OTR/L MMCPTPB [facility]   12/27/2019 11:15 AM SILVERIO Bucio MMCPTPB [facility]   12/30/2019  9:45 AM SILVERIO Mcdermott TOBODHF [facility]   12/30/2019 10:30 AM Luisito Cisneros, PTA MMCPTPB [facility]   12/30/2019 11:00 AM Nataly Eis, OTR/L MMCPTPB [facility]   12/31/2019  9:45 AM Nataly Eis, OTR/L MMCPTPB [facility]   12/31/2019 10:30 AM Liusito Cisneros, PTA MMCPTPB [facility]   12/31/2019 11:30 AM Marcell Worley I, SILVERIO RDJNQMR [facility]   1/2/2020  9:45 AM Roe Corrales RNHSGCT [facility]   1/2/2020 10:30 AM Marcell Worley I, SLP MMCPTPB [facility]   1/30/2020  8:30 PM [facility] SLEEP RM 4 MMCSL [facility]   2/10/2020  8:30 AM Levy Ventura MD [address]   2/14/2020 10:15 AM Timbo Alberts, Arcelia Nieto MD [id number] [facility]

## 2019-12-06 ENCOUNTER — HOSPITAL ENCOUNTER (OUTPATIENT)
Dept: PHYSICAL THERAPY | Age: 64
Discharge: HOME | End: 2019-12-06
Payer: MEDICARE

## 2019-12-06 PROCEDURE — 92507 TX SP LANG VOICE COMM INDIV: CPT

## 2019-12-06 PROCEDURE — 97032 APPL MODALITY 1+ESTIM EA 15: CPT

## 2019-12-06 PROCEDURE — 97112 NEUROMUSCULAR REEDUCATION: CPT

## 2019-12-06 PROCEDURE — 92526 ORAL FUNCTION THERAPY: CPT

## 2019-12-06 NOTE — PROGRESS NOTES
OT DAILY TREATMENT NOTE 10-18    Patient Name: Ana Cap  Date:2019  : 1955  [x]  Patient  Verified  Payor: [place] MEDICARE / Plan: [place] MEDICARE PART A & B / Product Type: Medicare /    In time:115  Out time:200  Total Treatment Time (min): 45  Visit #: 8 of 12    Medicare/BCBS Only   Total Timed Codes (min):  45 1:1 Treatment Time:  45     Treatment Area: Upper extremity weakness [R29.898]  Cerebral infarction, unspecified [I63.9]    SUBJECTIVE  Pain Level (0-10 scale): 0/10  Any medication changes, allergies to medications, adverse drug reactions, diagnosis change, or new procedure performed?: [x] No    [] Yes (see summary sheet for update)  Subjective functional status/changes:   [] No changes reported  Patient reports feeling sadness and frustration over current state of hand. Worries that he will not regain function in hand. OBJECTIVE    Modality rationale: increase muscle contraction/control to improve the patients ability to use right UE   Min Type Additional Details       []? ? Estim:  []?? Unatt       []? ?IFC  []? ?Premod                        []? ? Other:  []??w/ice   []? ?w/heat  Position:  Location:     15 []? ? Estim: []??Att    []? ?TENS instruct  [x]? ? NMES                    []? ? Other:  []??w/US   []? ?w/ice   []? ?w/heat  Position:wrist extensors   Location:       []? ?  Traction: []?? Cervical       []? ?Lumbar                       []? ? Prone          []? ?Supine                       []? ?Intermittent   []? ? Continuous Lbs:  []?? before manual  []? ? after manual       []? ?  Ultrasound: []??Continuous   []? ? Pulsed                           []? ?1MHz   []? ? 3MHz W/cm2:  Location:       []? ?  Iontophoresis with dexamethasone         Location: []?? Take home patch   []? ? In clinic       []? ?  Ice     []? ?  heat  []? ?  Ice massage  []? ?  Laser   []? ?  Anodyne Position:  Location:       []? ?  Laser with stim  []? ?  Other:  Position:  Location:       []? ?  Vasopneumatic Device Pressure:       []? ? lo []? ? med []?? hi   Temperature: []?? lo []? ? med []?? hi        [x]? ? Skin assessment post-treatment:  [x]? ? intact []? ?redness- no adverse reaction  []? ?redness - adverse reaction:      30 min Neuromuscular Re-education:  []  See flow sheet :   Rationale: increase ROM and increase strength  to improve the patients ability to move right arm     Seated:    Floor Reach   Retraction/Elevation   Weightbearing push ups onto Right palm to increase UE strength and reduce tone   Elbow Flexion/Extension with matched resistance    Hand: Lap to table 3 x 5   Raised Table: AROM Elbow Flexion/Extension    Functional Grasp in lap: Wash Cloth            With   [] TE   [] TA   [] neuro   [] other: Patient Education: [x] Review HEP    [] Progressed/Changed HEP based on:   [] positioning   [] body mechanics   [] transfers   [] heat/ice application   [] Splint wear/care   [] Sensory re-education   [] scar management      [] other:            Other Objective/Functional Measures:     Increased tremors noted on this date        Pain Level (0-10 scale) post treatment: 0/10    ASSESSMENT/Changes in Function: Shoulder strength improving, increased tremors on this date     Patient will continue to benefit from skilled OT services to modify and progress therapeutic interventions, address ROM deficits, address strength deficits, analyze and cue movement patterns and instruct in home and community integration to attain remaining goals. []  See Plan of Care  []  See progress note/recertification  []  See Discharge Summary         Progress towards goals / Updated goals:  1.  Patient will be independent in home exercise program for AAROM of right UE to reduce level of assist for self care   Status at Eval/Last Progress Note: not addressed at eval  Status at Last Visit: Met 11/8/19     2.  Patient will be able to don right shoe with AFO with supervision. Status at Eval/Last Progress Note:  Current status:  met 12/2/19      3.  Patient will be familiar with task modification to allow him to bathe left side without assist.  Status at Eval/Last Progress Note: not addressed at eval  Current status;: Education on adaptive bathing techniques/strategies,ordered long handled sponge, met 11/18/19        Long Term Goals: To be accomplished in 8 weeks:              1.  Patient will be able to place right arm on table using active motion for use of right hand as assist  Status at Eval/Last Progress Note:  Current status: , met x 5 today 11/18/19, 11/22/19, progressing in standing with shoulder 11/25/19, met x 10 12/2/19 met 2x10 12/4/19     2.  Patient will be Modified independent for all self care tasks. Status at Eval/Last Progress Note: Not adressed  Current status::progressing, adaptive techniques for donning right sneaker given with demo 11/8/19, awaiting long handled sponge, still with min assist to get heel over AFO correctly 11/18/19, met 12/2/19     3.  Patient will be able to use right hand as stabilizing assist for self care activities.   Status at Eval/Last Progress Note: unable   Current status:  Able to hold wash cloth intermittently With Right hand 11/8/19, met with pill bottles 11/18/19     4.  Patient will report improved ability to use right hand to assist with home care as shown by reduced limitation of at least 20% on Neuro quality of Life.   Status at Eval/Last Progress Note: 82% limitation   Current status:Not reassessed    PLAN  []  Upgrade activities as tolerated     [x]  Continue plan of care  []  Update interventions per flow sheet       []  Discharge due to:_  []  Other:_      DARELL Salmeron 12/6/2019  8:36 AM    Future Appointments   Date Time Provider Marc Ruvalcaba   12/6/2019  1:15 PM Chapo Sosa [id number] [facility]   12/6/2019  2:00 PM Huy Dubois, SILVERIO MMCPTPB [facility]   12/9/2019  9:45 AM SILVERIO Diaz EXIZJRL [facility]   12/9/2019 10:30 AM Poli Turner, SUKHWINDER MMCPTPB [facility]   12/9/2019 11:00 AM Kristan Nicci Oskar PIICONW [facility]   12/11/2019  9:30 AM Dee Kirsties, PTA MMCPTPB [facility]   12/11/2019 10:00 AM SILVERIO Sandoval MMCPTPB [facility]   12/11/2019 11:00 AM Arlette Eric, OTR/L MMCPTPB [facility]   12/13/2019  9:45 AM SILVERIO Sandoval FIJITXR [facility]   12/13/2019 10:30 AM Arlette Eric, OTR/L MMCPTPB [facility]   12/16/2019 12:00 PM Norberto Gonzalez PT MMCPTPB [facility]   12/16/2019  1:00 PM Kathleen Esquivel JPVNZSS [facility]   12/16/2019  1:45 PM SILVERIO Rossi YFFBWEY [facility]   12/18/2019  2:00 PM Dee Marie, PTA MMCPTPB [facility]   12/18/2019  2:45 PM SILVERIO Rossi PVWXTGV [facility]   12/18/2019  3:30 PM Kathleen Esquivel LSKLVYX [facility]   12/20/2019  1:00 PM SILVERIO Rossi AIDLKHZ [facility]   12/20/2019  2:00 PM Kathleen Esquivel XJMBIKA [facility]   12/23/2019  9:00 AM Dee Socks, PTA MMCPTPB [facility]   12/23/2019  9:30 AM Fadiada Luisa CGBJJSG [facility]   12/23/2019 10:30 AM Ishan Beyer I SLP RNVKOTR [facility]   12/24/2019  9:00 AM Dee Socks, PTA MMCPTPB [facility]   12/24/2019  9:30 AM Nevada Ebbs GZGILOQ [facility]   12/24/2019 10:15 AM Ishan Beyer I, SLP QPZNHZM [facility]   12/27/2019 10:30 AM Arlette Eric, OTR/L MMCPTPB [facility]   12/27/2019 11:15 AM Ceilne Carlin, SLP MMCPTPB [facility]   12/30/2019  9:45 AM Tigre Waterman, SLP EWJGOOQ [facility]   12/30/2019 10:30 AM Dee Socks, PTA MMCPTPB [facility]   12/30/2019 11:00 AM Arlette Eric, OTR/L MMCPTPB [facility]   12/31/2019  9:45 AM Arlette Eric, OTR/L MMCPTPB [facility]   12/31/2019 10:30 AM Dee Socks, PTA MMCPTPB [facility]   12/31/2019 11:30 AM Ishan Beyer I SLP CYYWMTO [facility]   1/2/2020  9:45 AM Kathleen Esquivel QYDJVMY [facility]   1/2/2020 10:30 AM Ishan Beyer I SLP MMCPTPB [facility]   1/30/2020  8:30 PM [facility] SLEEP RM 4 MMCSL [facility]   2/10/2020  8:30 AM Olga Nevarez MD [address]   2/14/2020 10:15 AM Riley Rod MD [place]

## 2019-12-06 NOTE — PROGRESS NOTES
ST DAILY TREATMENT NOTE    Patient Name: Anibal Olivares  Date:2019  : 1955  [x]  Patient  Verified  Payor: Payor: [place] MEDICARE / Plan: [place] MEDICARE PART A & B / Product Type: Medicare /   In time: 2:05  Out time: 2:45  Total Treatment Time (min): 40  Visit #: 16 of 24    Treatment Diagnosis: Dysarthria and anarthria [R47.1]    SUBJECTIVE  Pain Level (0-10 scale): 0  Any medication changes, allergies to medications, adverse drug reactions, diagnosis change, or new procedure performed?: [x] No    [] Yes (see summary sheet for update)    Subjective functional status/changes:   [x] No changes reported      OBJECTIVE  Treatment provided includes:  Increase/Improve:  []  Voice Quality []  Cognitive Linguistic Skills [x]  Laryngeal/Pharyngeal Exercises   []  Vocal Loudness []  Reading Comprehension [x]  Swallowing Skills    []  Vocal Cord Function []  Auditory Comprehension []  Oral Motor Skills   []  Resonance []  Writing Skills [x]  Compensatory strategies    [x]  Speech Intelligibility []  Expressive Language []  Attention   []  Breath Support/Coord.  []  Receptive language []  Memory   [x]  Articulation []  Safety Awareness [x] pt education   []  Fluency []  Word Retrieval []        Treatment Provided:  - /s/ in multi-syllable words and phrases to increase speech intelligibility for communication  - /l/ productions in phrases to increase speech intelligibility   - pt education/review compensatory strategies for speech intelligibility   - oropharyngeal strengthening exercises to increase efficiency of oropharyngeal swallow   - review compensatory strategies to decrease occurrence of dysphagia     Patient/Caregiver  Education: [x] Review HEP      HEP/Handouts given: continue oropharyngeal strengthening exercises and working on medial /s/ in phrases     Pain Level (0-10 scale) post treatment: 0    ASSESSMENT   Pt progressing in across swallowing tasks and gradually in speech production   []   Improving appropriately and progressing toward goals  [x]   Improving slowly and progressing toward goals  []   Approximating goals/maximum potential  [x]   Continues to benefit from skilled therapy to address remaining functional deficits  []   Not progressing toward goals and plan of care will be adjusted    Patient will continue to benefit from skilled therapy to address remaining functional deficits: dysphagia , dysarthria     Progress towards goals / Updated goals:  1. The pt will increase quality and length of phonation by sustaining ah utilizing effective diaphragmatic breath support for >10 seconds in 3/3 sessions to increase functional speech intelligibility. Met 12/4/19: x3/3 sessions met;  x5 trials (1) ~11 sec, (2) ~10 sec, (3) ~13 sec (4) ~12 sec (5) ~11 sec   2. Pt will demonstrate ability to produce improve prosody, rhyme, rhythm in structured sentences, word emphasis tasks, and varying pitch phrases using various comp strategies with 80% acc given min-mod cues in 3/3 opportunities to improve naturalness of speech. 12/6/19: not addressed ; prior: 12/4/19:  85% accuracy with min-modA x2/3 sessions   3. The patient will articulate (s/z) consonants at the word level- phrases level with 90% acc with use of comp strategies and OMES  to improve speech intelligibility to > 90% acc when provided with min cues. Current 12/6/19:  /s/ in multi-syllable words 100% accuracy; /s/ in phrases with 70% Michael increased to 80% with min-modA ,   4. The patient will articulate  (l, voiced /th/ consonants at the word level- phrases level  with 90% acc with use of comp strategies and OMES  to improve speech intelligibility to > 90% acc when provided with min cues.   Current 12/6/19: /l/ in phrases 95% accuracy with Michael  5. Patient will complete tongue base retraction,and laryngeal/pharyngeal strengthening exercises (including Sheyla maneuver, Mendelsohn maneuver, modified Shaker with CTAR ball, hard /k/ in isolation,  effortful swallow, etc.), with min cues in 5/5 sessions in order to improve the strength/agility/efficiency of his swallow for improved swallowing safety and QOL.    Current 12/6/19: rickey x25 Michael , fatigue noted x1 break and extended time to complete , supraglottic swallow with puree x15 with modA fading to Michael,    6. Patient will recall/demonstrate aspiration precautions and safe swallowing strategies with 100% acc when provided with occasional cues in 5/5 sessions (small sips/bites; chin tuck with all liquid intake; alternate liquids/solids; slow rate; NO straws, Sit upright at 90 degree angle during PO trials)  to decrease the reported incidences of dysphagia and s/sx of aspiration.   Current 12/6/19: recalls compensatory strategies x100% accuracy with Michael ; utilized with po with Michael  7. Patient will tolerate trials of thin liquids (>4 ounces) using compensatory strategies without s/sx of aspiration/penetration when provided with Michael across 5/5 sessions. Met: 12/4/19: ~4 oz without s/sx across session, cannot rule out silent aspiration. x1 cue to tuck chin (x5/5 sessions met)     8. Patient will tolerate regular solids with utilization of compensatory strategies without s/sx of aspiration/penetration or distress with Michael across 5/5 sessions.   Current 12/6/19: not addressed this date (x3/5 sessions met)     PLAN  [x]  Continue plan of care  []  Modify Goals/Treatment Plan      []  Discharge due to:  [] Other:    SILVERIO Powell 12/6/2019  2:05 PM    Future Appointments   Date Time Provider Marc Ruvalcaba   12/9/2019  9:45 AM José Miguel Reese, SILVERIO [id number] [facility]   12/9/2019 10:30 AM Myron Gastelum, PT MMCPTPB [facility]   12/9/2019 11:00 AM Severiano Reddish MKEMWHS [facility]   12/11/2019  9:30 AM Khari Reyez PTA MMCPTPB [facility]   12/11/2019 10:00 AM Odilon Gann I, SILVERIO DYGSENX [facility]   12/11/2019 11:00 AM Levonia Ohm, OTR/L MMCPTPB [facility]   12/13/2019  9:45 AM SILVERIO White DHSSAXD [facility]   12/13/2019 10:30 AM Merryl Morning, OTR/L MMCPTPB [facility]   12/16/2019 12:00 PM Poli Turner, PT MMCPTPB [facility]   12/16/2019  1:00 PM Chapo Red VIJEQKT [facility]   12/16/2019  1:45 PM Celiaonna Medicine, SLP TSMRCCB [facility]   12/18/2019  2:00 PM Firman Escort, PTA MMCPTPB [facility]   12/18/2019  2:45 PM Zacka Medicine, SLP MMCPTPB [facility]   12/18/2019  3:30 PM Chapo Red XRLRNHR [facility]   12/20/2019  1:00 PM Celiaonna Medicine, SLP KGHZLJP [facility]   12/20/2019  2:00 PM Chapo Red LXPXBHX [facility]   12/23/2019  9:00 AM Firman Escort, PTA MMCPTPB [facility]   12/23/2019  9:30 AM Edison Hernandez RLCTQBO [facility]   12/23/2019 10:30 AM SILVERIO Rider NYHSKDE [facility]   12/24/2019  9:00 AM Firman Escort, PTA MMCPTPB [facility]   12/24/2019  9:30 AM Edison Hernandez WIQSFNU [facility]   12/24/2019 10:15 AM Michele Sherman I, SLP CPDQNGK [facility]   12/27/2019 10:30 AM Merryl Morning, OTR/L MMCPTPB [facility]   12/27/2019 11:15 AM Sly Patel, SLP MMCPTPB [facility]   12/30/2019  9:45 AM Zacka Medicine, SLP SEWFJUA [facility]   12/30/2019 10:30 AM Firman Escort, PTA MMCPTPB [facility]   12/30/2019 11:00 AM Merryl Morning, OTR/L MMCPTPB [facility]   12/31/2019  9:45 AM Merryl Morning, OTR/L MMCPTPB [facility]   12/31/2019 10:30 AM Firman Escort, PTA MMCPTPB [facility]   12/31/2019 11:30 AM Skip Lane I, SILVERIO VNQGGHW [facility]   1/2/2020  9:45 AM Chapo Red TRJNRII [facility]   1/2/2020 10:30 AM Skip Lane HERNÁNDEZ, SILVERIO WKVLRFO [facility]   1/6/2020  9:45 AM [facility], SLP YSEIFMR [facility]   1/6/2020 10:30 AM Firman Escort, PTA MMCPTPB [facility]   1/6/2020 11:00 AM Merryl Morning, OTR/L MMCPTPB [facility]   1/8/2020  9:45 AM [facility], SLP LGYIBTF [facility]   1/8/2020 10:30 AM Firman Escort, PTA MMCPTPB [facility]   1/8/2020 11:00 AM Merryl Morning, OTR/L MMCPTPB [facility]   1/10/2020  9:45 AM Merryl Morning, OTR/L MMCPTPB [facility]   1/10/2020 10:30 AM Skip Lane I, SLP MMCPTPB [facility]   1/13/2020 10:15 AM Chapo Sosa DJGDNSM [facility]   1/13/2020 11:15 AM SILVERIO Rider AIIXMLH [facility]   1/13/2020 12:00 PM Poli Turner, PT EKTIMWS [facility]   1/15/2020  1:00 PM Aleida Gonzalez, PTA MMCPTPB [facility]   1/15/2020  1:30 PM SILVERIO Mina MMCPTPB [facility]   1/15/2020  2:30 PM Ellie Downs, OTR/L MMCPTPB [facility]   1/17/2020  9:45 AM SILVERIO Mina GLWHMLN [facility]   1/20/2020  9:45 AM SILVERIO Mina MMCPTPB [facility]   1/20/2020 10:30 AM Андрей Deras, PT MMCPTPB [facility]   1/20/2020 11:00 AM Bryon Quinonez JELLBXK [facility]   1/22/2020 10:15 AM Ellie Downs, OTR/L MMCPTPB [facility]   1/22/2020 11:00 AM Aleida Gonzalez, PTA MMCPTPB [facility]   1/22/2020 11:30 AM SILVERIO Mina MMCPTPB [facility]   1/24/2020  9:45 AM Ellie Downs, OTR/L MMCPTPB [facility]   1/27/2020  9:45 AM Emerita Cordero SLP MMCPTPB [facility]   1/27/2020 10:30 AM Андрей Deras, PT MMCPTPB [facility]   1/27/2020 11:00 AM Bryon Quinonez YJKJARH [facility]   1/29/2020 10:45 AM SILVERIO Mina IBVIYBB [facility]   1/29/2020 11:30 AM Aleida Gonzalez, PTA MMCPTPB [facility]   1/29/2020 12:30 PM Bryon Quinonez FFMLRQU [facility]   1/30/2020  8:30 PM [facility] SLEEP RM 4 MMCSL [facility]   1/31/2020  9:45 AM SILVERIO Mina UQFYGUY [facility]   2/10/2020  8:30 AM Job Degree, MD [address]   2/14/2020 10:15 AM Darya Johnson MD [place]

## 2019-12-09 ENCOUNTER — HOSPITAL ENCOUNTER (OUTPATIENT)
Dept: PHYSICAL THERAPY | Age: 64
Discharge: HOME | End: 2019-12-09
Payer: MEDICARE

## 2019-12-09 PROCEDURE — 97110 THERAPEUTIC EXERCISES: CPT

## 2019-12-09 PROCEDURE — 97032 APPL MODALITY 1+ESTIM EA 15: CPT

## 2019-12-09 PROCEDURE — 92526 ORAL FUNCTION THERAPY: CPT

## 2019-12-09 PROCEDURE — 97112 NEUROMUSCULAR REEDUCATION: CPT

## 2019-12-09 PROCEDURE — 92507 TX SP LANG VOICE COMM INDIV: CPT

## 2019-12-09 NOTE — PROGRESS NOTES
OT DAILY TREATMENT NOTE 10-18    Patient Name: Karen Triplett  Date:2019  : 1955  [x]  Patient  Verified  Payor: [place] MEDICARE / Plan: [place] MEDICARE PART A & B / Product Type: Medicare /    In time:1100  Out time:1139  Total Treatment Time (min): 39  Visit #: 9 of 12    Medicare/BCBS Only   Total Timed Codes (min):  39 1:1 Treatment Time:  39     Treatment Area: Upper extremity weakness [R29.898]  Cerebral infarction, unspecified [I63.9]    SUBJECTIVE  Pain Level (0-10 scale): 0/10  Any medication changes, allergies to medications, adverse drug reactions, diagnosis change, or new procedure performed?: [x] No    [] Yes (see summary sheet for update)  Subjective functional status/changes:   [] No changes reported  Patient reports increased fatigue and having an \"off day\"     OBJECTIVE            Modality rationale: increase muscle contraction/control to improve the patients ability to use right UE   Min Type Additional Details       []??? Estim:  []? ??Unatt       []? ??IFC  []? ? ?Premod                        []? ??Other:  []???w/ice   []? ??w/heat  Position:  Location:     15 []??? Estim: []? ? ? Att    []? ??TENS instruct  [x]? ??NMES                    []? ??Other:  []???w/US   []? ??w/ice   []? ??w/heat  Position:wrist extensors   Location:       []? ??  Traction: []??? Cervical       []? ? ?Lumbar                       []? ?? Prone          []? ? ?Supine                       []? ? ? Intermittent   []? ?? Continuous Lbs:  []??? before manual  []? ?? after manual       []? ??  Ultrasound: []? ? ? Continuous   []? ?? Pulsed                           []? ??1MHz   []? ??3MHz W/cm2:  Location:       []? ??  Iontophoresis with dexamethasone         Location: []??? Take home patch   []??? In clinic       []? ??  Ice     []? ??  heat  []? ??  Ice massage  []? ??  Laser   []? ??  Anodyne Position:  Location:       []? ??  Laser with stim  []? ??  Other:  Position:  Location:       []? ??  Vasopneumatic Device Pressure:       []? ?? lo []? ?? med []??? hi   Temperature: []??? lo []? ?? med []? ?? hi        [x]? ?? Skin assessment post-treatment:  [x]? ??intact []? ??redness- no adverse reaction  []? ??redness - adverse reaction:     24 min Neuromuscular Re-education:  []  See flow sheet :   Rationale: increase ROM and increase strength  to improve the patients ability to move right arm     Seated:   Floor Reach   Retraction/Elevation   Weight Bearing push ups onto Right palm to increase UE strength and reduce tone   Elbow Flexion/Extension with matched resistance   Raised Table: AROM Elbow Flexion/Extension   Hand: Lap to table 2x5          With   [] TE   [] TA   [] neuro   [] other: Patient Education: [x] Review HEP    [] Progressed/Changed HEP based on:   [] positioning   [] body mechanics   [] transfers   [] heat/ice application   [] Splint wear/care   [] Sensory re-education   [] scar management      [] other:            Other Objective/Functional Measures:     Decreased strength noted in Right Shoulder noted   Increased difficulty with hand to table on this date   Good response with wrist extension when using NMES     Pain Level (0-10 scale) post treatment: 0/10    ASSESSMENT/Changes in Function: Increased fatigue affecting function on this date     Patient will continue to benefit from skilled OT services to modify and progress therapeutic interventions, address ROM deficits, address strength deficits and instruct in home and community integration to attain remaining goals. []  See Plan of Care  []  See progress note/recertification  []  See Discharge Summary         Progress towards goals / Updated goals:  1.  Patient will be independent in home exercise program for AAROM of right UE to reduce level of assist for self care   Status at Eval/Last Progress Note: not addressed at eval  Status at Last Visit: Met 11/8/19     2.  Patient will be able to don right shoe with AFO with supervision.   Status at Eval/Last Progress Note:  Current status:  met 12/2/19      3.  Patient will be familiar with task modification to allow him to bathe left side without assist.  Status at Eval/Last Progress Note: not addressed at eval  Current status;: Education on adaptive bathing techniques/strategies,ordered long handled sponge, met 11/18/19        Long Term Goals: To be accomplished in 8 weeks:              1.  Patient will be able to place right arm on table using active motion for use of right hand as assist  Status at Eval/Last Progress Note:  Current status: , met x 5 today 11/18/19, 11/22/19, progressing in standing with shoulder 11/25/19, met x 10 12/2/19 met 2x10 12/4/19     2.  Patient will be Modified independent for all self care tasks. Status at Eval/Last Progress Note: Not adressed  Current status::progressing, adaptive techniques for donning right sneaker given with demo 11/8/19, awaiting long handled sponge, still with min assist to get heel over AFO correctly 11/18/19, met 12/2/19     3.  Patient will be able to use right hand as stabilizing assist for self care activities.   Status at Eval/Last Progress Note: unable   Current status:  Able to hold wash cloth intermittently With Right hand 11/8/19, met with pill bottles 11/18/19     4.  Patient will report improved ability to use right hand to assist with home care as shown by reduced limitation of at least 20% on Neuro quality of Life.   Status at Eval/Last Progress Note: 82% limitation   Current status:Not reassessed    PLAN  []  Upgrade activities as tolerated     [x]  Continue plan of care  []  Update interventions per flow sheet       []  Discharge due to:_  []  Other:_      DARELL Mosher 12/9/2019  8:34 AM    Future Appointments   Date Time Provider Marc Ruvalcaba   12/9/2019  9:45 AM Jose Traore, SLP [facility]CP[facility]   12/9/2019 10:30 AM Sumeet Fry, PT [facility]   12/9/2019 11:00 AM [facility]   12/11/2019  9:30 AM Josh Diego PTA [facility]   12/11/2019 10:00 AM Drake Abernathy I SLP [facility]   12/11/2019 11:00 AM Angeline Bustamante, OTR/L [facility]CP[facility]   12/13/2019  9:45 AM Drake Abernathy I SLP [id number] [facility]   12/13/2019 10:30 AM Angeline Bustamante, OTR/L [facility]   12/16/2019 12:00 PM Iliana Cesar, PT [facility]   12/16/2019  1:00 PM Dai Medici LHLBBDN [facility]   12/16/2019  1:45 PM SILVERIO Morrison IFUDAKZ [facility]   12/18/2019  2:00 PM [place] Nurse, PTA [facility]CP[facility]   12/18/2019  2:45 PM SILVERIO Morrison CYWIPQJ [facility]   12/18/2019  3:30 PM Dai Medici IXOFGBQ [facility]   12/20/2019  1:00 PM SILVERIO Morrison RKFRSDP [facility]   12/20/2019  2:00 PM Dai Medici JXSWBOL [facility]   12/23/2019  9:00 AM [place] Nurse, PTA [facility]CP[facility]   12/23/2019  9:30 AM Susanne Motto JQAVIKN [facility]   12/23/2019 10:30 AM Drake Abernathy I, SLP YMPASVB [facility]   12/24/2019  9:00 AM [place] Nurse, PTA [facility]CPTP[facility]   12/24/2019  9:30 AM Susanne Motto EAOEAGZ [facility]   12/24/2019 10:15 AM SILVERIO Razo QXMELLJ [facility]   12/27/2019 10:30 AM Angeline Limber, OTR/L [facility]   12/27/2019 11:15 AM Leona Coy SLP [facility]   12/30/2019  9:45 AM SILVERIO Morrison VHXRWNT [facility]   12/30/2019 10:30 AM [place] Nurse, PTA [facility]CPTP[facility]   12/30/2019 11:00 AM Angeline Limber, OTR/L [facility]   12/31/2019  9:45 AM Angeline Limber, OTR/L [facility]   12/31/2019 10:30 AM [place] Nurse, PTA [facility]   12/31/2019 11:30 AM SILVERIO Razo ODJOZQT [facility]   1/2/2020  9:45 AM Dai Savage VXYNSNH [facility]   1/2/2020 10:30 AM SILVERIO Morrison AQHXTDR [facility]   1/6/2020  9:45 AM SILVERIO Morrison TGBMXVC [facility]   1/6/2020 10:30 AM [place] Nurse, PTA MMCPTPB [facility]   1/6/2020 11:00 AM Angeline Limber, OTR/L [facility]CPTPB [facility]   1/8/2020  9:45 AM SILVERIO Morrison SHCGFYA [facility]   1/8/2020 10:30 AM [place] Nurse, PTA [facility]CPTPB [facility]   1/8/2020 11:00 AM Angeline Bustamante, OTR/L [facility]CP[facility]   1/10/2020  9:45 AM Angeline Bustamante, OTR/L [facility]   1/10/2020 10:30 AM Diana Zarco, SLP [facility]   1/13/2020 10:15 AM Torrie Starch ENEAMAH [facility]   1/13/2020 11:15 AM SILVERIO Swartz UZSKDIB [facility]   1/13/2020 12:00 PM Wing Alvarado, PT [facility]   1/15/2020  1:00 PM Kevin Marin, PTA [facility]   1/15/2020  1:30 PM Diana Zarco SLP [facility]   1/15/2020  2:30 PM Debraia Rohit, OTR/L [facility]   1/17/2020  9:45 AM SILVERIO Duarte VINPCHEIKH [facility]   1/20/2020  9:45 AM Diana Zarco SLP [facility]CPTP[facility]   1/20/2020 10:30 AM Wing Alvraado, PT [facility]   1/20/2020 11:00 AM Torrie Starch HDFUGQK [facility]   1/22/2020 10:15 AM Debraia Null, OTR/L [facility]   1/22/2020 11:00 AM Kevin Marin, PTA [facility]   1/22/2020 11:30 AM Diana Zarco, SLP [facility]   1/24/2020  9:45 AM Alvia Null, OTR/L [facility]   1/27/2020  9:45 AM Diana Zarco, SLP [facility]CPTPB [facility]   1/27/2020 10:30 AM Wing Alvarado, PT [facility]CP[facility]   1/27/2020 11:00 AM Torrie Starch [id number] [facility]   1/29/2020 10:45 AM SILVERIO Swartz YYOSHAW [facility]   1/29/2020 11:30 AM Kevin Marin, PTA [facility]CPTPB [facility]   1/29/2020 12:30 PM Torrie Starch FDZGDUG [facility]   1/30/2020  8:30 PM [facility] SLEEP RM 4 MMCSL [facility]   1/31/2020  9:45 AM SILVERIO Swartz UWGNHOL [facility]   2/10/2020  8:30 AM Etienne Rodriguez MD [address]   2/14/2020 10:15 AM Chelita La MD [facility]

## 2019-12-09 NOTE — PROGRESS NOTES
ST DAILY TREATMENT NOTE    Patient Name: Compa Ivy  Date:2019  : 1955  [x]  Patient  Verified  Payor: Payor: [place] MEDICARE / Plan: [place] MEDICARE PART A & B / Product Type: Medicare /   In time: 9:50  Out time: 10:30  Total Treatment Time (min): 40  Visit #: 17 of 24    Treatment Diagnosis: Dysarthria and anarthria [R47.1]   Dysphagia oropharyngeal phase [R13.12]    SUBJECTIVE  Pain Level (0-10 scale): 0  Any medication changes, allergies to medications, adverse drug reactions, diagnosis change, or new procedure performed?: [x] No    [] Yes (see summary sheet for update)    Subjective functional status/changes:   [x] No changes reported  Pt denies recent coughing during meals or s/sx of aspiration during po intake     OBJECTIVE  Treatment provided includes:  Increase/Improve:  []  Voice Quality []  Cognitive Linguistic Skills [x]  Laryngeal/Pharyngeal Exercises   []  Vocal Loudness []  Reading Comprehension [x]  Swallowing Skills    []  Vocal Cord Function []  Auditory Comprehension []  Oral Motor Skills   []  Resonance []  Writing Skills [x]  Compensatory strategies    [x]  Speech Intelligibility []  Expressive Language []  Attention   [x]  Breath Support/Coord.  []  Receptive language []  Memory   [x]  Articulation []  Safety Awareness [x] pt education    []  Fluency []  Word Retrieval []         Treatment Provided:  - oropharyngeal strengthening/endurance exercise to increase swallow efficiency for safe oral intake  - po trials with skilled cueing to appropriately utilize compensatory strategies to increase safety during meals   - /z/ productions in phrases to increase articulatory precision for improved speech intelligibility   - prosody changes (word stress) to improve naturalness of speech    Patient/Caregiver  Education: [x] Review HEP      HEP/Handouts given: /s,z/ productions with compensatory strategies, continue oropharyngeal strengthening exercises     Pain Level (0-10 scale) post treatment: 0    ASSESSMENT   x3 current STGs met. Pt is progressing towards additional goals addressed this date   []   Improving appropriately and progressing toward goals  [x]   Improving slowly and progressing toward goals  []   Approximating goals/maximum potential  [x]   Continues to benefit from skilled therapy to address remaining functional deficits  []   Not progressing toward goals and plan of care will be adjusted    Patient will continue to benefit from skilled therapy to address remaining functional deficits: dysphagia, dysarthria      Progress towards goals / Updated goals:  1. The pt will increase quality and length of phonation by sustaining ah utilizing effective diaphragmatic breath support for >10 seconds in 3/3 sessions to increase functional speech intelligibility. Met 12/4/19: x3/3 sessions met;  x5 trials (1) ~11 sec, (2) ~10 sec, (3) ~13 sec (4) ~12 sec (5) ~11 sec   2. Pt will demonstrate ability to produce improve prosody, rhyme, rhythm in structured sentences, word emphasis tasks, and varying pitch phrases using various comp strategies with 80% acc given min-mod cues in 3/3 opportunities to improve naturalness of speech. Met 12/9/19:  85% accuracy with Michael x3/3 sessions   3. The patient will articulate (s/z) consonants at the word level- phrases level with 90% acc with use of comp strategies and OMES  to improve speech intelligibility to > 90% acc when provided with min cues. Current 12/9/19:  /z/ with 80% with Michael in phrases ; prior 12/6/19:  /s/ in multi-syllable words 100% accuracy; /s/ in phrases with 70% Michael increased to 80% with min-modA ,   4. The patient will articulate  (l, voiced /th/ consonants at the word level- phrases level  with 90% acc with use of comp strategies and OMES  to improve speech intelligibility to > 90% acc when provided with min cues.   12/9/19: not addressed this date ; Prior: 12/6/19: /l/ in phrases 95% accuracy with Michael  5. Patient will complete tongue base retraction,and laryngeal/pharyngeal strengthening exercises (including Sheyla maneuver, Mendelsohn maneuver, modified Shaker with CTAR ball, hard /k/ in isolation,  effortful swallow, etc.), with min cues in 5/5 sessions in order to improve the strength/agility/efficiency of his swallow for improved swallowing safety and QOL.    Current 12/9/19: modified shaker with CTAR ball extended hold x7, 5 sec hold x25 with Michael,  mendelsohn x20 with min-modA  6. Patient will recall/demonstrate aspiration precautions and safe swallowing strategies with 100% acc when provided with occasional cues in 5/5 sessions (small sips/bites; chin tuck with all liquid intake; alternate liquids/solids; slow rate; NO straws, Sit upright at 90 degree angle during PO trials)  to decrease the reported incidences of dysphagia and s/sx of aspiration.   Current 12/9/19: recalls compensatory strategies x100% accuracy with Michael ; utilized with po with Michael  7. Patient will tolerate trials of thin liquids (>4 ounces) using compensatory strategies without s/sx of aspiration/penetration when provided with Michael across 5/5 sessions. Met previously 12/4/19: ~4 oz without s/sx across session (x5/5 sessions met)  12/9/19: informally measured pt tolerates ~4oz of thin liquids without overt s/sx of aspiration-cannot rule out silent aspiration   8. Patient will tolerate regular solids with utilization of compensatory strategies without s/sx of aspiration/penetration or distress with Michael across 5/5 sessions.   Current 12/9/19: not addressed this date (x3/5 sessions met)     PLAN  [x]  Continue plan of care  []  Modify Goals/Treatment Plan      []  Discharge due to:  [] Other:    Nayeli Jean, SLP 12/9/2019  9:49 AM    Future Appointments   Date Time Provider Marc Ruvalcaba   12/9/2019 10:30 AM Sumeet Fry PT [facility]CPTP[facility]   12/9/2019 11:00 AM [facility] CKIIBSD [facility]   12/11/2019  9:30 AM Josh Diego PTA MEPFNLM [facility]   12/11/2019 10:00 AM Tish Arce I, SILVERIO MMCPTPB [facility]   12/11/2019 11:00 AM Sen Contreras, OTR/L [facility]   12/13/2019  9:45 AM Tish Arce I, SLP XJHKSJJ [facility]   12/13/2019 10:30 AM Sen Contreras, OTR/L [facility]   12/16/2019 12:00 PM Peg Burgos, PT [facility]CPTP[facility]   12/16/2019  1:00 PM Jasmin Crew [id number] [facility]   12/16/2019  1:45 PM Bharath Nieto, SILVERIO YQSHLNF [facility]   12/18/2019  2:00 PM Linda Joel, PTA [facility]CPTPB [facility]   12/18/2019  2:45 PM Bharath Nieto, SILVERIO VJUMQGB [facility]   12/18/2019  3:30 PM Jasmin Crew CKRRQWG [facility]   12/20/2019  1:00 PM Bharath Nieto, SILVERIO ZEALLKY [facility]   12/20/2019  2:00 PM Jasmin Crew [id number] [facility]   12/23/2019  9:00 AM Linda Joel, PTA MMCPTPB [facility]   12/23/2019  9:30 AM Andi Sawant FLXPKHD [facility]   12/23/2019 10:30 AM Tish Arce I, SLP PJAUJMF [facility]   12/24/2019  9:00 AM Crossriya Joel, PTA MMCPTPB [facility]   12/24/2019  9:30 AM Andi BANUELOSXGOLC [facility]   12/24/2019 10:15 AM SILVERIO Fernandez RUFUYMX [facility]   12/27/2019 10:30 AM Sen Contreras, OTR/L [facility]CPTPB [facility]   12/27/2019 11:15 AM Darryle Officer, SLP [facility]   12/30/2019  9:45 AM SILVERIO Esqueda ZJSWDGV [facility]   12/30/2019 10:30 AM Linda Joel, PTA [facility]CPTPB [facility]   12/30/2019 11:00 AM Sen Contreras, OTR/L [facility]CPTPB [facility]   12/31/2019  9:45 AM Sen Contreras, OTR/L [facility]CPTPB [facility]   12/31/2019 10:30 AM Linda oJel, PTA [facility]   12/31/2019 11:30 AM SILVERIO Fernandez ZLLJZZC [facility]   1/2/2020  9:45 AM Jasmin Choudhary EBOTHIC [facility]   1/2/2020 10:30 AM SILVERIO Esqueda BMMUHTO [facility]   1/6/2020  9:45 AM SILVERIO Esqueda OFABBYT [facility]   1/6/2020 10:30 AM Linda Joel, PTA MMCPTPB [facility]   1/6/2020 11:00 AM Sen Contreras, OTR/L [facility]CPTPB [facility]   1/8/2020  9:45 AM SILVERIO Esqueda GTEUILU [facility]   1/8/2020 10:30 AM Linda Joel, PTA [facility]CPTPB [facility]   1/8/2020 11:00 AM Sen Contreras, OTR/L [facility]CPTPB [facility]   1/10/2020  9:45 AM Sen Contreras, OTR/L [facility]CPTPB [facility]   1/10/2020 10:30 AM SILVERIO Win MMCPTPB [facility]   1/13/2020 10:15 AM Severiano Reddish WYCBICJ [facility]   1/13/2020 11:15 AM SILVERIO Win RNBXQAP [facility]   1/13/2020 12:00 PM Myron Gastelum, PT MMCPTPB [facility]   1/15/2020  1:00 PM Khari Reyez, PTA [facility]CPTP[facility]   1/15/2020  1:30 PM SILVERIO Win MMCPTPB [facility]   1/15/2020  2:30 PM Levonia Ohm, OTR/L [facility]CPTPB [facility]   1/17/2020  9:45 AM Odilon Gann I, SLP YPTPICG [facility]   1/20/2020  9:45 AM SILVERIO Win MMCPTPB [facility]   1/20/2020 10:30 AM Myron Gastelum, PT MMCPTPB [facility]   1/20/2020 11:00 AM Severiano Reddish HRTRCYU [facility]   1/22/2020 10:15 AM Levonia Ohm, OTR/L MMCPTPB [facility]   1/22/2020 11:00 AM Khari Reyez, PTA MMCPTPB [facility]   1/22/2020 11:30 AM SILVERIO Win MMCPTPB [facility]   1/24/2020  9:45 AM Levonia Ohm, OTR/L MMCPTPB [facility]   1/27/2020  9:45 AM SILVERIO Win MMCPTPB [facility]   1/27/2020 10:30 AM Myron Gastelum, PT MMCPTPB [facility]   1/27/2020 11:00 AM Severiano Redflorenciogregorio CMRPPVP [facility]   1/29/2020 10:45 AM José Miguel Reese, SILVERIO FGJOURG [facility]   1/29/2020 11:30 AM Khari Reyez, PTA MMCPTPB [facility]   1/29/2020 12:30 PM Severiano Reddish [id number] [facility]   1/30/2020  8:30 PM [facility] SLEEP RM 4 MMCSL [facility]   1/31/2020  9:45 AM SILVERIO Win [id number] [facility]   2/10/2020  8:30 AM Garcia Calle MD [address]   2/14/2020 10:15 AM Abelino [facility]idalgo [facility]

## 2019-12-09 NOTE — PROGRESS NOTES
PT DAILY TREATMENT NOTE 10-18    Patient Name: Kaveh November  Date:2019  : 1955  [x]  Patient  Verified  Payor: [place] MEDICARE / Plan: [place] MEDICARE PART A & B / Product Type: Medicare /    In time:1030  Out time:1101  Total Treatment Time (min): 31  Visit #: 4 of 12    Medicare/BCBS Only   Total Timed Codes (min):  31 1:1 Treatment Time:  31       Treatment Area: Lower extremity weakness [R29.898]  Cerebral infarction, unspecified [I63.9]    SUBJECTIVE  Pain Level (0-10 scale): 0/10  Any medication changes, allergies to medications, adverse drug reactions, diagnosis change, or new procedure performed?: [x] No    [] Yes (see summary sheet for update)  Subjective functional status/changes:   [] No changes reported  Pt reports he is good with switching up walking with the bike every other visit. OBJECTIVE    31 min Therapeutic Exercise:  [] See flow sheet :   Rationale: increase ROM, increase strength, improve coordination and improve balance to improve the patients ability to ease with ADL's      With   [] TE   [] TA   [] neuro   [] other: Patient Education: [x] Review HEP    [] Progressed/Changed HEP based on:   [] positioning   [] body mechanics   [] transfers   [] heat/ice application    [] other:      Other Objective/Functional Measures: Ambulate with SBQC x 213 ft + 31 ft with SBA/CGA. Worked on hip hiking on the right with tactile cueing. Worked on eccentric step down on the left LE with min assist on descent. Pain Level (0-10 scale) post treatment: 0/10    ASSESSMENT/Changes in Function: Pt is pleased with his progress so far. He is very quiet and agreeable to anything asked of him. He agreed to walk and get onto the bike more.      Patient will continue to benefit from skilled PT services to address ROM deficits, address strength deficits, analyze and address soft tissue restrictions, analyze and cue movement patterns, analyze and modify body mechanics/ergonomics, assess and modify postural abnormalities and address imbalance/dizziness to attain remaining goals. [x]  See Plan of Care  []  See progress note/recertification  []  See Discharge Summary         Progress towards goals / Updated goals:  1. Pt will ambulate 4 steps x 3 with 1 HR with supervision to be able to navigate stairs at home.                2. Pt will increase FOTO score by 7 pts to improve LE function.                3. Pt will increase right LE hip, quad and HS strength to 4/5 or greater to be able to navigate community distances.   Slowly progressing. 12/2/19                4. Pt will demonstrate Romberg EO/EC on a compliant surface to decrease fall risk during ADL's.                5. Pt will ambulate with LRAD >150 ft including an obstacle course to be able to navigate short community distances safely.    Met/Progressing. Pt wants to work on increasing endurance.  12/9/19    PLAN  [x]  Upgrade activities as tolerated     [x]  Continue plan of care  []  Update interventions per flow sheet       []  Discharge due to:_  []  Other:_      Michael Huggins, PT 12/9/2019  8:57 AM    Future Appointments   Date Time Provider Marc Ruvalcaba   12/9/2019  9:45 AM SILVERIO Rivera [id number] [facility]   12/9/2019 10:30 AM Carito Hinojosa PT MMCPTPB [facility]   12/9/2019 11:00 AM Jeremiah Simpson YHWOGFE [facility]   12/11/2019  9:30 AM Alejandro Carodza PTA MMCPTPB [facility]   12/11/2019 10:00 AM SILVERIO Doan WWTDCIZ [facility]   12/11/2019 11:00 AM Ana Castellon, OTR/L MMCPTPB [facility]   12/13/2019  9:45 AM SILVERIO Rivera CFXASSX [facility]   12/13/2019 10:30 AM Ana Castellon, OTR/L MMCPTPB [facility]   12/16/2019 12:00 PM Carito Hinojosa PT MMCPTPB [facility]   12/16/2019  1:00 PM Jeremiah Simpson WITPQFT [facility]   12/16/2019  1:45 PM SILVERIO Rivera IDVNPBB [facility]   12/18/2019  2:00 PM Alejandro Cardoza, PTA MMCPTPB [facility]   12/18/2019  2:45 PM SILVERIO Rivera TUOSCXO [facility]   12/18/2019  3:30 PM Jeremiah Simpson XBDHLZA [facility]   12/20/2019  1:00 PM Gifty Marx EDGAR, SLP IMBOMIU [facility]   12/20/2019  2:00 PM Jeremiah Simpson UKUWOIB [facility]   12/23/2019  9:00 AM Alejandro Cardoza, PTA MMCPTPB [facility]   12/23/2019  9:30 AM Kimberly Kelly ICXJJXX [facility]   12/23/2019 10:30 AM Gifty Marx I, SLP LJAMNYA [facility]   12/24/2019  9:00 AM Alejandro Cardoza, PTA MMCPTPB [facility]   12/24/2019  9:30 AM Kimberly Kelly PFCRZEA [facility]   12/24/2019 10:15 AM Gifty Marx I, SLP FCGHFKP [facility]   12/27/2019 10:30 AM Ana Castellon, OTR/L MMCPTPB [facility]   12/27/2019 11:15 AM Roni Balderas, SLP MMCPTPB [facility]   12/30/2019  9:45 AM Melba Villanueva, SILVERIO VWUITIP [facility]   12/30/2019 10:30 AM Alejandro Cardoza, PTA MMCPTPB [facility]   12/30/2019 11:00 AM Ana Castellon, OTR/L MMCPTPB [facility]   12/31/2019  9:45 AM Ana Castellon, OTR/L MMCPTPB [facility]   12/31/2019 10:30 AM Alejandro Cardoza, PTA MMCPTPB [facility]   12/31/2019 11:30 AM Gifty Marx I, SLP MMCPTPB [facility]   1/2/2020  9:45 AM Jeremiah Simpson JAUXVGN [facility]   1/2/2020 10:30 AM Melba Rich, SLP UBPCWHW [facility]   1/6/2020  9:45 AM Melba Rich, SLP ZKKCBNG [facility]   1/6/2020 10:30 AM Melvilledavian Krausewer, PTA MMCPTPB [facility]   1/6/2020 11:00 AM Ettie Ravinder, OTR/L MMCPTPB [facility]   1/8/2020  9:45 AM Melba Rich, SLP FXRLBTL [facility]   1/8/2020 10:30 AM Alejandro Cardoza, PTA MMCPTPB [facility]   1/8/2020 11:00 AM Ettie Ravinder, OTR/L MMCPTPB [facility]   1/10/2020  9:45 AM Ettie Ravinder, OTR/L MMCPTPB [facility]   1/10/2020 10:30 AM Gifty Hum I, SLP MMCPTPB [facility]   1/13/2020 10:15 AM Jeremiah Simpson QWAZNJM [facility]   1/13/2020 11:15 AM Gifty Hum I, SLP KZIYEVR [facility]   1/13/2020 12:00 PM Carito Hinojosa, PT MMCPTPB [facility]   1/15/2020  1:00 PM Alejandro Cardoza, PTA MMCPTPB [facility]   1/15/2020  1:30 PM Gifty Hum I, SLP QVZZNQZ [facility]   1/15/2020  2:30 PM Ettie Ravinder, OTR/L MMCPTPB [facility]   1/17/2020  9:45 AM Melba Villanueva, SLP AILNCRI [facility]   1/20/2020  9:45 AM SILVERIO Rivera DTAFZGF [facility]   1/20/2020 10:30 AM Carito Hinojosa PT MMCPTPB [facility]   1/20/2020 11:00 AM Jeremiah Simpson CMXVYRC [facility] 1/22/2020 10:15 AM Giselle Ramos, OTR/L MMCPTPB [facility]   1/22/2020 11:00 AM Romana Rea, PTA MMCPTPB [facility]   1/22/2020 11:30 AM Ruben Alston I SLP TRYBKFY [facility]   1/24/2020  9:45 AM Giselle Ramos, OTR/L MMCPTPB [facility]   1/27/2020  9:45 AM SILVERIO Stout LBAZLOI [facility]   1/27/2020 10:30 AM Wellington Hurtado, PT MMCPTPB [facility]   1/27/2020 11:00 AM Lainey Ulster VWDUMLN [facility]   1/29/2020 10:45 AM Aretha Barton, SILVERIO UDOQPTM [facility]   1/29/2020 11:30 AM Romana Rea, PTA MMCPTPB [facility]   1/29/2020 12:30 PM [facility] RYBYRXB [facility]   1/30/2020  8:30 PM [facility] SLEEP RM 4 MMCSL [facility]   1/31/2020  9:45 AM Aretha Barton, SLP VYFYWMY [facility]   2/10/2020  8:30 AM Arie Purdy MD [address]   2/14/2020 10:15 AM Lurdes Howard MD [facility]

## 2019-12-11 ENCOUNTER — HOSPITAL ENCOUNTER (OUTPATIENT)
Dept: PHYSICAL THERAPY | Age: 64
Discharge: HOME | End: 2019-12-11
Payer: MEDICARE

## 2019-12-11 PROCEDURE — 92526 ORAL FUNCTION THERAPY: CPT

## 2019-12-11 PROCEDURE — 97110 THERAPEUTIC EXERCISES: CPT

## 2019-12-11 PROCEDURE — 97112 NEUROMUSCULAR REEDUCATION: CPT

## 2019-12-11 PROCEDURE — 97032 APPL MODALITY 1+ESTIM EA 15: CPT

## 2019-12-11 PROCEDURE — 92507 TX SP LANG VOICE COMM INDIV: CPT

## 2019-12-11 NOTE — PROGRESS NOTES
ST DAILY TREATMENT NOTE    Patient Name: Mireya Gomez  Date:2019  : 1955  [x]  Patient  Verified  Payor: Payor: [place] MEDICARE / Plan: VA MEDICARE PART A & B / Product Type: Medicare /   In time: 10:05  Out time: 10:45  Total Treatment Time (min): 40  Visit #: 18 of 24    Treatment Diagnosis: Dysarthria and anarthria [R47.1] Dysphagia oropharyngeal phase [R13.12]       SUBJECTIVE  Pain Level (0-10 scale): 0  Any medication changes, allergies to medications, adverse drug reactions, diagnosis change, or new procedure performed?: [x] No    [] Yes (see summary sheet for update)    Subjective functional status/changes:   [x] No changes reported      OBJECTIVE  Treatment provided includes:  Increase/Improve:  []  Voice Quality []  Cognitive Linguistic Skills [x]  Laryngeal/Pharyngeal Exercises   []  Vocal Loudness []  Reading Comprehension [x]  Swallowing Skills    []  Vocal Cord Function []  Auditory Comprehension []  Oral Motor Skills   []  Resonance []  Writing Skills [x]  Compensatory strategies    [x]  Speech Intelligibility []  Expressive Language []  Attention   [x]  Breath Support/Coord. []  Receptive language []  Memory   []  Articulation []  Safety Awareness []    []  Fluency []  Word Retrieval []        Treatment Provided:  - diaphragmatic breathing exercises   - /s/ productions in phrases to increase articulatory precision for speech intelligibility   - laryngeal/pharyngeal/TBR strengthening exercises to increase efficiency of oropharyngeal swallow for safety and QOL   - pt education     Patient/Caregiver  Education: [x] Review HEP      HEP/Handouts given: increase rickey and mendelsohn to x20 reps for HEP    Pain Level (0-10 scale) post treatment: 0    ASSESSMENT   Progressing towards STGs addressed.  Continues to benefit from skilled cueing   []   Improving appropriately and progressing toward goals  [x]   Improving slowly and progressing toward goals  []   Approximating goals/maximum potential  [x]   Continues to benefit from skilled therapy to address remaining functional deficits  []   Not progressing toward goals and plan of care will be adjusted    Patient will continue to benefit from skilled therapy to address remaining functional deficits: dysarthria and dysphagia     Progress towards goals / Updated goals:  1. The pt will increase quality and length of phonation by sustaining ah utilizing effective diaphragmatic breath support for >10 seconds in 3/3 sessions to increase functional speech intelligibility. Met previously 12/4/19: x3/3 sessions met;  x5 trials (1) ~11 sec, (2) ~10 sec, (3) ~13 sec (4) ~12 sec (5) ~11 sec   2. Pt will demonstrate ability to produce improve prosody, rhyme, rhythm in structured sentences, word emphasis tasks, and varying pitch phrases using various comp strategies with 80% acc given min-mod cues in 3/3 opportunities to improve naturalness of speech.   Met previously 12/9/19:  85% accuracy with Michael x3/3 sessions   3. The patient will articulate (s/z) consonants at the word level- phrases level with 90% acc with use of comp strategies and OMES  to improve speech intelligibility to > 90% acc when provided with min cues. Current 12/11/19: /s/ in phrases with 80% Michael increasing to 100% with min-modA   4. The patient will articulate  (l, voiced /th/ consonants at the word level- phrases level  with 90% acc with use of comp strategies and OMES  to improve speech intelligibility to > 90% acc when provided with min cues.   12/11/19: not addressed this date ; Prior: 12/6/19: /l/ in phrases 95% accuracy with Michael  5. Patient will complete tongue base retraction,and laryngeal/pharyngeal strengthening exercises (including Sheyla maneuver, Mendelsohn maneuver, modified Shaker with CTAR ball, hard /k/ in isolation,  effortful swallow, etc.), with min cues in 5/5 sessions in order to improve the strength/agility/efficiency of his swallow for improved swallowing safety and QOL.    Current 12/11/19: hard /k/ words x56, x2 sets; rickey x20 with Michael for precision; high \"ee\" x25 with modA x3 breaks (~30-60 sec)  6. Patient will recall/demonstrate aspiration precautions and safe swallowing strategies with 100% acc when provided with occasional cues in 5/5 sessions (small sips/bites; chin tuck with all liquid intake; alternate liquids/solids; slow rate; NO straws, Sit upright at 90 degree angle during PO trials)  to decrease the reported incidences of dysphagia and s/sx of aspiration.   Current 12/11/19: recalls compensatory strategies x100% accuracy with Michael ; utilized with po with Michael - continued improvement with use of chin tuck, effortful swallow, slow rate   7. Patient will tolerate trials of thin liquids (>4 ounces) using compensatory strategies without s/sx of aspiration/penetration when provided with Michael across 5/5 sessions. Met previously 12/4/19: ~4 oz without s/sx across session (x5/5 sessions met)  12/9/19: informally measured pt tolerates ~4oz of thin liquids without overt s/sx of aspiration-cannot rule out silent aspiration   8. Patient will tolerate regular solids with utilization of compensatory strategies without s/sx of aspiration/penetration or distress with Michael across 5/5 sessions.   Current 12/11/19: not addressed this date (x3/5 sessions met)       PLAN  [x]  Continue plan of care  []  Modify Goals/Treatment Plan      []  Discharge due to:  [] Other:    SILVERIO Cali 12/11/2019  10:06 AM    Future Appointments   Date Time Provider Marc Ruvalcaba   12/11/2019 11:00 AM Sen Contreras, OTR/L MMCPTPB [facility]   12/13/2019  9:45 AM SILVERIO Esqueda RBMGWHL [facility]   12/13/2019 10:30 AM Sen Contreras, OTR/L MMCPTPB [facility]   12/16/2019 12:00 PM Peg Burgos PT MMCPTPB [facility]   12/16/2019  1:00 PM Jasmin Crew QBTCPVF [facility]   12/16/2019  1:45 PM SILVERIO Esqueda OZPFHAQ [facility]   12/18/2019  2:00 PM Linda Joel PTA MMCPTPB [facility]   12/18/2019  2:45 PM SILVERIO Johnson MMCPTPB [facility]   12/18/2019  3:30 PM Rosalina Alvarez FCXSLWO [facility]   12/20/2019  1:00 PM SILVERIO Johnson EUAFPTL [facility]   12/20/2019  2:00 PM Rosalina Alvarez DMSYVYA [facility]   12/23/2019  9:00 AM Gloria Gonzales PTA MMCPTPB [facility]   12/23/2019  9:30 AM Randall CHEEMA [facility]   12/23/2019 10:30 AM SILVERIO Hart SCDFMTA [facility]   12/24/2019  9:00 AM Gloria Gonzales PTA MMCPTPB [facility]   12/24/2019  9:30 AM Randall GUERRA [facility]   12/24/2019 10:15 AM SILVERIO Hart TWURSBM [facility]   12/27/2019 10:30 AM Jose Alberto Patel, OTR/L MMCPTPB [facility]   12/27/2019 11:15 AM SILVERIO Allen MMCPTPB [facility]   12/30/2019  9:45 AM SILVERIO Johnson ZEWSWIX [facility]   12/30/2019 10:30 AM Gloria Gonzales PTA MMCPTPB [facility]   12/30/2019 11:00 AM Jose Alberto Patel, OTR/L MMCPTPB [facility]   12/31/2019  9:45 AM Jose Alberto Patel, OTR/L MMCPTPB [facility]   12/31/2019 10:30 AM Gloria Gonzales PTA MMCPTPB [facility]   12/31/2019 11:30 AM Darlene Fountain I, SLP MMCPTPB [facility]   1/2/2020  9:45 AM Rosalina Alvarez BEHJGCO [facility]   1/2/2020 10:30 AM Vinita Raza, SILVERIO ETNEWOF [facility]   1/6/2020  9:45 AM Vinita Raza, SILVERIO YHOBQWP [facility]   1/6/2020 10:30 AM Gloria Gonzales, PTA MMCPTPB [facility]   1/6/2020 11:00 AM Jose Alberto Patel, OTR/L MMCPTPB [facility]   1/8/2020  9:45 AM Vinita Raza, SILVERIO HCQFRQM [facility]   1/8/2020 10:30 AM Gloria Gonzales, PTA MMCPTPB [facility]   1/8/2020 11:00 AM Jose Alberto Patel, OTR/L MMCPTPB [facility]   1/10/2020  9:45 AM Jose Alberto Patel, OTR/L MMCPTPB [facility]   1/10/2020 10:30 AM Darlene Fountain I, SLP MMCPTPB [facility]   1/13/2020 10:15 AM Rosalina Alvarez EPKHCJS [facility]   1/13/2020 11:15 AM Darlene Fountain I, SILVERIO BDNRSMX [facility]   1/13/2020 12:00 PM Caren Sanders, PT MMCPTPB [facility]   1/15/2020  1:00 PM Gloria Gonzales, PTA MMCPTPB [facility]   1/15/2020  1:30 PM Darlene Fountain I, SLP IUGWRCR [facility]   1/15/2020  2:30 PM Jose Alberto Patel, OTR/L MMCPTPB [facility]   1/17/2020  9:45 AM SILVERIO Hart LEPDMIH [facility]   1/20/2020  9:45 AM Vinita Raza, SLP MMCPTPB [address]   1/20/2020 10:30 AM Henrine Blonder, PT MMCPTPB [address]   1/20/2020 11:00 AM Severa Saba AWLYBQS [address]   1/22/2020 10:15 AM Barbarann Rho, OTR/L MMCPTPB [address]   1/22/2020 11:00 AM Ady Coast, PTA MMCPTPB [address]   1/22/2020 11:30 AM Jenise Taylor I, SLP MMCPTPB [address]   1/24/2020  9:45 AM Barbarann Rho, OTR/L MMCPTPB [address]   1/27/2020  9:45 AM Nick Leigh, SLP MMCPTPB [address]   1/27/2020 10:30 AM Henrine Blonder, PT MMCPTPB [address]   1/27/2020 11:00 AM Severa Saba IBLEGOO [address]   1/29/2020 10:45 AM North Lu, SLP FQTNDRC [address]   1/29/2020 11:30 AM Ady Coast, PTA MMCPTPB [address]   1/29/2020 12:30 PM Severa Saba CAZAWRX [address]   1/30/2020  8:30 PM [address] SLEEP RM 4 MMCSL [address]   1/31/2020  9:45 AM Nick Leigh, SLP [id number] [address]   2/10/2020  8:30 AM Alyssia Covarrubias MD [address]   2/14/2020 10:15 AM Barby Clark MD [place]

## 2019-12-11 NOTE — PROGRESS NOTES
PT DAILY TREATMENT NOTE 10-18    Patient Name: Lanny Rodgers  Date:2019  : 1955  [x]  Patient  Verified  Payor: [place] MEDICARE / Plan: [place] MEDICARE PART A & B / Product Type: Medicare /    In time:929  Out time:1000  Total Treatment Time (min): 31  Visit #: 5 of 12    Medicare/BCBS Only   Total Timed Codes (min):  31 1:1 Treatment Time:  31       Treatment Area: Lower extremity weakness [R29.898]  Cerebral infarction, unspecified [I63.9]    SUBJECTIVE  Pain Level (0-10 scale): 0/10  Any medication changes, allergies to medications, adverse drug reactions, diagnosis change, or new procedure performed?: [x] No    [] Yes (see summary sheet for update)  Subjective functional status/changes:   [] No changes reported  Pt stated that he is doing well today    OBJECTIVE    31 min Therapeutic Exercise:  [] See flow sheet :   Rationale: increase ROM and increase strength to improve the patients ability to increase ease with ADLs     With   [x] TE   [] TA   [] neuro   [] other: Patient Education: [x] Review HEP    [] Progressed/Changed HEP based on:   [] positioning   [] body mechanics   [] transfers   [] heat/ice application    [] other:      Other Objective/Functional Measures:   Had better foot clearance with step taps and step overs today  No LOB with static balance  No difficulty with added weight for SLR flex     Pain Level (0-10 scale) post treatment: 0/10    ASSESSMENT/Changes in Function:   Pt is slowly progressing toward goals. Strength in right LE is improving slowly. Pt cont to ambulate with SBQC. Cont to drag foot some with ambulation.      Patient will continue to benefit from skilled PT services to modify and progress therapeutic interventions, address functional mobility deficits, address ROM deficits, address strength deficits, analyze and cue movement patterns, assess and modify postural abnormalities, address imbalance/dizziness and instruct in home and community integration to attain remaining goals.     []  See Plan of Care  [x]  See progress note/recertification  []  See Discharge Summary         Progress towards goals / Updated goals:  1. Pt will ambulate 4 steps x 3 with 1 HR with supervision to be able to navigate stairs at home.                2. Pt will increase FOTO score by 7 pts to improve LE function.                3. Pt will increase right LE hip, quad and HS strength to 4/5 or greater to be able to navigate community distances.   Slowly progressing. 12/2/19                4. Pt will demonstrate Romberg EO/EC on a compliant surface to decrease fall risk during ADL's.                5. Pt will ambulate with LRAD >150 ft including an obstacle course to be able to navigate short community distances safely.    Met/Progressing. Pt wants to work on increasing endurance.  12/9/19    PLAN  []  Upgrade activities as tolerated     [x]  Continue plan of care  []  Update interventions per flow sheet       []  Discharge due to:_  []  Other:_      Loree Clinton PTA 12/11/2019  9:22 AM    Future Appointments   Date Time Provider Marc Ruvalcaba   12/11/2019  9:30 AM Marcela Hinojosa PTA MMCPTPB [facility]   12/11/2019 10:00 AM Preston Du, SILVERIO HIXCHJN [facility]   12/11/2019 11:00 AM oRland Mariee, OTR/L MMCPTPB [facility]   12/13/2019  9:45 AM SILVERIO Antonio GDHZQYM [facility]   12/13/2019 10:30 AM Roland Mariee, OTR/L MMCPTPB [facility]   12/16/2019 12:00 PM Priyank Hinds, PT MMCPTPB [facility]   12/16/2019  1:00 PM Jama Patel VSIJBQG [facility]   12/16/2019  1:45 PM Preston Du, SLP TMPBCNO [facility]   12/18/2019  2:00 PM Marcela Hinojosa PTA MMCPTPB [facility]   12/18/2019  2:45 PM Preston Du, SILVERIO TIXHQCI [facility]   12/18/2019  3:30 PM Arlys Stacks OWKYPRB [facility]   12/20/2019  1:00 PM Preston Du, SILVERIO BQVVHBR [facility]   12/20/2019  2:00 PM Arlys Stacks ZKZOVAV [facility]   12/23/2019  9:00 AM Marcela Hinojosa PTA MMCPTPB [facility]   12/23/2019  9:30 AM Norbert Romero SCCNFRV [facility]   12/23/2019 10:30 AM Preston Du, SLP MMCPTPB [facility]   12/24/2019  9:00 AM Rina Gunter PTA MMCPTPB [facility]   12/24/2019  9:30 AM Aline Joshi NWKLUYX [facility]   12/24/2019 10:15 AM Sable Jose Luis, SLP ZWIPNOS [facility]   12/27/2019 10:30 AM Rolly Pore, OTR/L MMCPTPB [facility]   12/27/2019 11:15 AM Haylee Wan SLP MMCPTPB [facility]   12/30/2019  9:45 AM Sable Jose Luis, SLP WOYPOHI [facility]   12/30/2019 10:30 AM Rina Gunter, PTA MMCPTPB [facility]   12/30/2019 11:00 AM Rolly Pore, OTR/L MMCPTPB [facility]   12/31/2019  9:45 AM Rolly Pore, OTR/L MMCPTPB [facility]   12/31/2019 10:30 AM Rina Gunter, PTA MMCPTPB [facility]   12/31/2019 11:30 AM Nely Terrazas I SLP MMCPTPB [facility]   1/2/2020  9:45 AM Dayton Fonseca PEYXZWD [facility]   1/2/2020 10:30 AM SILVERIO Dickson YMFUVOT [facility]   1/6/2020  9:45 AM SILVERIO Dickson EWIJOMM [facility]   1/6/2020 10:30 AM Rina Gunter, PTA MMCPTPB [facility]   1/6/2020 11:00 AM Rolly Pore, OTR/L MMCPTPB [facility]   1/8/2020  9:45 AM SILVERIO Dickson XKKCGPC [facility]   1/8/2020 10:30 AM Rina Gunter, PTA MMCPTPB [facility]   1/8/2020 11:00 AM Jacqulin Pore, OTR/L MMCPTPB [facility]   1/10/2020  9:45 AM Jacqulin Pore, OTR/L MMCPTPB [facility]   1/10/2020 10:30 AM Nely Terrazas I, SLP MMCPTPB [facility]   1/13/2020 10:15 AM Dayton Fonseca MGZMLVQ [facility]   1/13/2020 11:15 AM SILVERIO Griffith XSODUHP [facility]   1/13/2020 12:00 PM Polly Ramirez, PT MMCPTPB [facility]   1/15/2020  1:00 PM Rina Gunter PTA MMCPTPB [facility]   1/15/2020  1:30 PM Nely Terrazas I, SLP MEOGVPJ [facility]   1/15/2020  2:30 PM Jacqulin Pore, OTR/L MMCPTPB [facility]   1/17/2020  9:45 AM Nely Terrazas I SLP FZVAUFF [facility]   1/20/2020  9:45 AM Evelyne Amaya SLP GHFBBSV [facility]   1/20/2020 10:30 AM Polly Ramirez, PT MMCPTPB [facility]   1/20/2020 11:00 AM Dayton Fonseca BNIHOGR [facility]   1/22/2020 10:15 AM Jacqulin Pore, OTR/L MMCPTPB [facility]   1/22/2020 11:00 AM Rina Gunter PTA MMCPTPB [facility]   1/22/2020 11:30 AM SILVERIO Griffith [facility]   1/24/2020  9:45 AM Rolly Harris, OTR/L MMCPTPB [facility]   1/27/2020 9:45 AM SILVERIO Ramos IJGKMXC [facility]   1/27/2020 10:30 AM Paris Valero, PT MMCPTPB [facility]   1/27/2020 11:00 AM Sagar Powell WTUZONA [facility]   1/29/2020 10:45 AM SILVERIO Ramos FBCFMHM [facility]   1/29/2020 11:30 AM Chaparro Mitchell, PTA MMCPTPB [facility]   1/29/2020 12:30 PM Sagar Powell UTFVBEN [facility]   1/30/2020  8:30 PM [facility] SLEEP RM 4 MMCSL [facility]   1/31/2020  9:45 AM SILVERIO Ramos YXKCFWK [facility]   2/10/2020  8:30 AM Berny Villalobos MD [address]   2/14/2020 10:15 AM Lazaro Fofana MD [id number] [facility]

## 2019-12-11 NOTE — PROGRESS NOTES
OT DAILY TREATMENT NOTE 10-18    Patient Name: Lucas Rose  Date:2019  : 1955  [x]  Patient  Verified  Payor: [place] MEDICARE / Plan: [place] MEDICARE PART A & B / Product Type: Medicare /    In time:1100  Out time:1145  Total Treatment Time (min): 45  Visit #: 10 of 12    Medicare/BCBS Only   Total Timed Codes (min):  45 1:1 Treatment Time:  39     Treatment Area: Upper extremity weakness [R29.898]  Cerebral infarction, unspecified [I63.9]    SUBJECTIVE  Pain Level (0-10 scale): 0/10  Any medication changes, allergies to medications, adverse drug reactions, diagnosis change, or new procedure performed?: [x] No    [] Yes (see summary sheet for update)  Subjective functional status/changes:   [] No changes reported  My arm doesn't move by itself    OBJECTIVE    Modality rationale: increase muscle contraction/control to improve the patients ability to exted wrist and digits   Min Type Additional Details    [] Estim:  []Unatt       []IFC  []Premod                        []Other:  []w/ice   []w/heat  Position:  Location:   15 [] Estim: []Att    []TENS instruct  [x]NMES                    []Other:  []w/US   []w/ice   []w/heat  Position:  Location:alexandria aaron digit extension    []  Traction: [] Cervical       []Lumbar                       [] Prone          []Supine                       []Intermittent   []Continuous Lbs:  [] before manual  [] after manual    []  Ultrasound: []Continuous   [] Pulsed                           []1MHz   []3MHz W/cm2:  Location:    []  Iontophoresis with dexamethasone         Location: [] Take home patch   [] In clinic    []  Ice     []  heat  []  Ice massage  []  Laser   []  Anodyne Position:  Location:    []  Laser with stim  []  Other:  Position:  Location:    []  Vasopneumatic Device Pressure:       [] lo [] med [] hi   Temperature: [] lo [] med [] hi       [x] Skin assessment post-treatment:  []intact []redness- no adverse reaction    []redness - adverse reaction:     30 min Neuromuscular Re-education:  []  See flow sheet :   Rationale: increase ROM and increase strength  to improve the patients ability to move right arm  Right UE  Seated elbow flexion to approximately 40 degrees x 10  Supine place and hold right UE in flexion x 30 seconds  Right UE recip sup pro with some response with quick stretch  Wrist fle ext quick stretch no response  Brushing to palm with digit flexion actively  Supine right shoulder abduction 20-30 degrees        With   [] TE   [] TA   [] neuro   [] other: Patient Education: [x] Review HEP    [] Progressed/Changed HEP based on:   [] positioning   [] body mechanics   [] transfers   [] heat/ice application   [] Splint wear/care   [] Sensory re-education   [] scar management      [] other:            Other Objective/Functional Measures:   Atrophy of forearm and hand in right UE present  Good response to wrist and digit e stim in clinic  Now with active digit flexion following brushing ot palm     Pain Level (0-10 scale) post treatment: 0/10    ASSESSMENT/Changes in Function: Ongoing atrophy of forearm and hand muscles, improveing shoulder stabilizaiton and active motion at elbow    Patient will continue to benefit from skilled OT services to modify and progress therapeutic interventions, address ROM deficits, address strength deficits, analyze and cue movement patterns and instruct in home and community integration to attain remaining goals. []  See Plan of Care  []  See progress note/recertification  []  See Discharge Summary         Progress towards goals / Updated goals:  .  Patient will be independent in home exercise program for AAROM of right UE to reduce level of assist for self care   Status at Eval/Last Progress Note: not addressed at eval  Status at Last Visit: Met 11/8/19     2.  Patient will be able to don right shoe with AFO with supervision.   Status at Eval/Last Progress Note:  Current status:  met 12/2/19      3.  Patient will be familiar with task modification to allow him to bathe left side without assist.  Status at Eval/Last Progress Note: not addressed at eval  Current status;: Education on adaptive bathing techniques/strategies,ordered long handled sponge, met 11/18/19        Long Term Goals: To be accomplished in 8 weeks:              1.  Patient will be able to place right arm on table using active motion for use of right hand as assist  Status at Eval/Last Progress Note:  Current status: , met x 5 today 11/18/19, 11/22/19, progressing in standing with shoulder 11/25/19, met x 10 12/2/19 met 2x10 12/4/19     2.  Patient will be Modified independent for all self care tasks. Status at Eval/Last Progress Note: Not adressed  Current status::progressing, adaptive techniques for donning right sneaker given with demo 11/8/19, awaiting long handled sponge, still with min assist to get heel over AFO correctly 11/18/19, met 12/2/19     3.  Patient will be able to use right hand as stabilizing assist for self care activities.   Status at Eval/Last Progress Note: unable   Current status:  Able to hold wash cloth intermittently With Right hand 11/8/19, met with pill bottles 11/18/19     4.  Patient will report improved ability to use right hand to assist with home care as shown by reduced limitation of at least 20% on Neuro quality of Life.   Status at Eval/Last Progress Note: 82% limitation   Current status:Not reassessed    PLAN  []  Upgrade activities as tolerated     [x]  Continue plan of care  []  Update interventions per flow sheet       []  Discharge due to:_  []  Other:_      MALLIKA Hebert/L 12/11/2019  8:26 AM    Future Appointments   Date Time Provider Marc Ruvalcaba   12/11/2019  9:30 AM Kevin Marin, LINDA MMCPTPB [facility]   12/11/2019 10:00 AM SILVERIO Swartz MMCPTIMTIAZ [facility]   12/11/2019 11:00 AM Rafaela Bee OTR/L MMCPTPB [facility]   12/13/2019  9:45 AM SILVERIO Swartz DIIKCZG [facility]   12/13/2019 10:30 AM Rafaela Bee, OTR/L MMCPTPB [facility] 12/16/2019 12:00 PM Maddy Moseley, PT MMCPTPB [facility]   12/16/2019  1:00 PM Do Lazcano CBYUTUN [facility]   12/16/2019  1:45 PM Cici Barillas, SLP KPTTHIX [facility]   12/18/2019  2:00 PM Tempie Omar, PTA MMCPTPB [facility]   12/18/2019  2:45 PM Cici Barillas, SLP MMCPTPB [facility]   12/18/2019  3:30 PM Do Lazcano YFUEVTG [facility]   12/20/2019  1:00 PM Cici Barillas, SLP MGDBOES [facility]   12/20/2019  2:00 PM Do Lazcano NXCSQFN [facility]   12/23/2019  9:00 AM Tempie Omar, PTA MMCPTPB [facility]   12/23/2019  9:30 AM Euna Sessions LCJEAMW [facility]   12/23/2019 10:30 AM SILVERIO Hooper NDIITMU [facility]   12/24/2019  9:00 AM Tempie Omar, PTA MMCPTPB [facility]   12/24/2019  9:30 AM Euna Sessions SEDJYEQ [facility]   12/24/2019 10:15 AM SILVERIO Hooper VWAAASL [facility]   12/27/2019 10:30 AM Dejan Myles, OTR/L MMCPTPB [facility]   12/27/2019 11:15 AM René Ríos, SLP MMCPTPB [facility]   12/30/2019  9:45 AM Cici Barillas, SLP QEKRYQZ [facility]   12/30/2019 10:30 AM Tempie Omar, PTA MMCPTPB [facility]   12/30/2019 11:00 AM Dejan Cord, OTR/L MMCPTPB [facility]   12/31/2019  9:45 AM Martha Cord, OTR/L MMCPTPB [facility]   12/31/2019 10:30 AM Tempie Omar, PTA MMCPTPB [facility]   12/31/2019 11:30 AM SILVEROI Hooper MMCPTPB [facility]   1/2/2020  9:45 AM Do WHITEZNUFT [facility]   1/2/2020 10:30 AM SILVERIO Emmanuel SBHURVH [facility]   1/6/2020  9:45 AM SILVERIO Emmanuel AXJUOKG [facility]   1/6/2020 10:30 AM Tempie Omar, PTA MMCPTPB [facility]   1/6/2020 11:00 AM Dejan Cord, OTR/L MMCPTPB [facility]   1/8/2020  9:45 AM SILVERIO Emmanuel WFWUFIH [facility]   1/8/2020 10:30 AM Tempie Omar, PTA MMCPTPB [facility]   1/8/2020 11:00 AM Dejan Cord, OTR/L MMCPTPB [facility]   1/10/2020  9:45 AM Martha Cord, OTR/L MMCPTPB [facility]   1/10/2020 10:30 AM Laina Dunlap I SLP MMCPTPB [facility]   1/13/2020 10:15 AM Do Neno IIJIJQW [facility]   1/13/2020 11:15 AM Laina Dunlap I, SLP LVPFJNR [facility]   1/13/2020 12:00 PM Maddy Moseley, PT MMCPTPB [facility]   1/15/2020  1:00 PM Sally Kumar, PTA [facility]   1/15/2020  1:30 PM Gema Burger, SLP [facility]B [facility]   1/15/2020  2:30 PM Jose F Jewell, OTR/L [facility]CPTPB [facility]   1/17/2020  9:45 AM Gema Burger, SILVERIO CSKYXIQ [facility]   1/20/2020  9:45 AM Gema Burger, SLP MMCPTPB [facility]   1/20/2020 10:30 AM Ramsey Booth, PT [facility]CPTPB [facility]   1/20/2020 11:00 AM Richi Monteiro GEPQSNW [facility]   1/22/2020 10:15 AM Jose F Jewell, OTR/L MMCPTPB [facility]   1/22/2020 11:00 AM Sally Kumar, PTA MMCPTPB [facility]   1/22/2020 11:30 AM SILVERIO Oliveira MMCPTPB [facility]   1/24/2020  9:45 AM Jose F Jewell, OTR/L [facility]CPTPB [facility]   1/27/2020  9:45 AM SILVERIO Oliveira MMCPTPB [facility]   1/27/2020 10:30 AM Ramsey Booth, PT [facility]CPTPB [facility]   1/27/2020 11:00 AM Richi Monteiro DHRHRAH [facility]   1/29/2020 10:45 AM Gema Burger, SILVERIO BNOPSTZ [facility]   1/29/2020 11:30 AM Sally Kumar, PTA MMCPTPB [facility]   1/29/2020 12:30 PM Richi Monteiro FHTSIXM [facility]   1/30/2020  8:30 PM [facility] SLEEP RM 4 MMCSL [facility]   1/31/2020  9:45 AM SILVERIO Oliveira NTTLRPY [facility]   2/10/2020  8:30 AM Marleny Holbrook MD [address] [facility]   2/14/2020 10:15 AM Robert Hernandez, [facility]

## 2019-12-12 NOTE — PROGRESS NOTES
[facility] [place] [facility]  [address] [place] [address]  [phone] fax    Continued Plan of Care/ Re-certification for Occupational Therapy Services    Patient name: Nivia Alexandre Start of Care: 10/22/19   Referral source: Bree Barillas NP : 1955   Medical/Treatment Diagnosis: Upper extremity weakness [R29.898]  Cerebral infarction, unspecified [I63.9]  Payor: [place] MEDICARE / Plan: [place] MEDICARE PART A & B / Product Type: Medicare /  Onset Date:19     Prior Hospitalization: see medical history Provider#: [id number]   Medications: Verified on Patient Summary List    Comorbidities: **Depression, HTN, DM, arthritis**  Prior Level of Function:*REtired from [employer], I self care yard care driving, part time parts delivery, carpentry work   Visits from [place] of Care: 20    Missed Visits: 0    The Plan of Care and following information is based on the patient's current status:  1.  Patient will be independent in home exercise program for AAROM of right UE to reduce level of assist for self care   Status at Eval/Last Progress Note: not addressed at eval  Status at Last Visit: Met 19     2.  Patient will be able to don right shoe with AFO with supervision.   Status at Eval/Last Progress Note:  Current status:  met 19      3.  Patient will be familiar with task modification to allow him to bathe left side without assist.  Status at Eval/Last Progress Note: not addressed at al  Current status;: Education on adaptive bathing techniques/strategies,ordered long handled sponge, met 19        Long Term Goals: To be accomplished in  [employer]   1.  Patient will be able to place right arm on table using active motion for use of right hand as assist  Status at Eval/Last Progress Note:  Current status: , met x 5 today 19, 19, progressing in standing with shoulder 19, met x 10 19 met 2x10 19     2.  Patient will be Modified independent for all self care tasks. Status at Eval/Last Progress Note: Not adressed  Current status::progressing, adaptive techniques for donning right sneaker given with demo 11/8/19, awaiting long handled sponge, still with min assist to get heel over AFO correctly 11/18/19, met 12/2/19     3.  Patient will be able to use right hand as stabilizing assist for self care activities. Status at Eval/Last Progress Note: unable   Current status:  Progressing, Able to hold wash cloth intermittently With Right hand 11/8/19, met with pill bottles 11/18/19     4.  Patient will report improved ability to use right hand to assist with home care as shown by reduced limitation of at least 20% on Neuro quality of Life.   Status at Eval/Last Progress Note: 82% limitation   Current status:Not reassessed    Key functional changes: Now able to flex elbow to bring hand to table actively. Now able to perfrom slight right shoulder flexion in standing with elbow flexion in standing to place  in standing. Now modified independent for all self care skills. Problems/ barriers to goal attainment: Ongoing atrophy of forearm wrist and hand muscles. Treatment Plan: Therapeutic exercise, Therapeutic activities, Neuromuscular re-education, Physical agent/modality, Patient education and ADLs/IADLs      Patient Goal (s) has been updated and includes: Use right arm again, make hand work     Goals for this certification period to be accomplished in 4 weeks:  1. Patient will be able to actively grasp and release light items with right hand to assist with self care tasks. 2.  Patient will be able to actively flex shoulder to 45 degrees to improve ability to place arm through sleeve. 3.  Patient will be able to actively extend wrist to posiiton hand for grasp of items.     4.  Patient will report improved ability to use right hand to assist with home care as shown by reduced limitation of at least 20% on Neuro quality of Life.     Frequency / Duration: Patient to be seen 3 times per week for 4 weeks:    Assessment / Recommendations:Patient has ongoing atrophy of right upper extremity, particularly wrist , forearm and hand muscles. He is unable to actively extend digits and wrist without NMES at this time. He is showing good improvement in function of elbow and shoulder and benefits from in clinic use of NMES. He will benefit from home unit to improve functional recovery. Certification Period: 12/13/19-1/11/20    MALLIKA Renae/L 12/12/2019 3:06 PM    ________________________________________________________________________  I certify that the above Therapy Services are being furnished while the patient is under my care. I agree with the treatment plan and certify that this therapy is necessary.       Physician's Signature:____________Date:_________TIME:________    ** Signature, Date and Time must be completed for valid certification **      Please sign and return to [facility]  [address] [place] [address]            [phone] fax

## 2019-12-13 ENCOUNTER — HOSPITAL ENCOUNTER (OUTPATIENT)
Dept: PHYSICAL THERAPY | Age: 64
Discharge: HOME | End: 2019-12-13
Payer: MEDICARE

## 2019-12-13 PROCEDURE — 97110 THERAPEUTIC EXERCISES: CPT

## 2019-12-13 PROCEDURE — 97032 APPL MODALITY 1+ESTIM EA 15: CPT

## 2019-12-13 PROCEDURE — 97112 NEUROMUSCULAR REEDUCATION: CPT

## 2019-12-13 PROCEDURE — 92507 TX SP LANG VOICE COMM INDIV: CPT

## 2019-12-13 PROCEDURE — 92526 ORAL FUNCTION THERAPY: CPT

## 2019-12-13 NOTE — PROGRESS NOTES
ST DAILY TREATMENT NOTE    Patient Name: Sondra Campbell  Date:2019  : 1955  [x]  Patient  Verified  Payor: Payor: VA MEDICARE / Plan: VA MEDICARE PART A & B / Product Type: Medicare /   In time: 9:50  Out time: 10:30   Total Treatment Time (min): 40   Visit #: 19 of 24     Treatment Diagnosis: Dysarthria and anarthria [R47.1] oropharyngeal dysphagia[ R13.12 ]    SUBJECTIVE  Pain Level (0-10 scale): 0  Any medication changes, allergies to medications, adverse drug reactions, diagnosis change, or new procedure performed?: [x] No    [] Yes (see summary sheet for update)    Subjective functional status/changes:   [x] No changes reported  \"My voice is stronger\"    OBJECTIVE  Treatment provided includes:  Increase/Improve:  []  Voice Quality []  Cognitive Linguistic Skills [x]  Laryngeal/Pharyngeal Exercises   []  Vocal Loudness []  Reading Comprehension [x]  Swallowing Skills    []  Vocal Cord Function []  Auditory Comprehension []  Oral Motor Skills   []  Resonance []  Writing Skills [x]  Compensatory strategies    [x]  Speech Intelligibility []  Expressive Language []  Attention   [x]  Breath Support/Coord.  []  Receptive language []  Memory   []  Articulation []  Safety Awareness []    []  Fluency []  Word Retrieval []      Treatment Provided:  - po trials utilizing compensatory strategies to increase safety and decrease occurrence of dysphagia   - pharyngeal/laryngeal/TBR strengthening exercises with skilled cueing to increase precision for overall improved oropharyngeal efficiency  - pt education regarding mixed consistencies   - diaphragmatic breathing for vowel prolongations and in functional phrases and sentences   - /s/ and /l/ productions in phrases with skilled cueing to increase precision and intelligibility     Patient/Caregiver  Education: [x] Review HEP      HEP/Handouts given: continue HEP established     Pain Level (0-10 scale) post treatment: 0    ASSESSMENT   Pt is progressing towards STGs addressed  []   Improving appropriately and progressing toward goals  [x]   Improving slowly and progressing toward goals  []   Approximating goals/maximum potential  [x]   Continues to benefit from skilled therapy to address remaining functional deficits  []   Not progressing toward goals and plan of care will be adjusted    Patient will continue to benefit from skilled therapy to address remaining functional deficits: dysphagia, dysarthria     Progress towards goals / Updated goals:  1. The pt will increase quality and length of phonation by sustaining ah utilizing effective diaphragmatic breath support for >10 seconds in 3/3 sessions to increase functional speech intelligibility. Met previously 12/4/19: x3/3 sessions met;  x5 trials (1) ~11 sec, (2) ~10 sec, (3) ~13 sec (4) ~12 sec (5) ~11 sec   2. Pt will demonstrate ability to produce improve prosody, rhyme, rhythm in structured sentences, word emphasis tasks, and varying pitch phrases using various comp strategies with 80% acc given min-mod cues in 3/3 opportunities to improve naturalness of speech.   Met previously 12/9/19:  85% accuracy with Michael x3/3 sessions   3. The patient will articulate (s/z) consonants at the word level- phrases level with 90% acc with use of comp strategies and OMES  to improve speech intelligibility to > 90% acc when provided with min cues. Current 12/11/19: progressing : /s/ in phrases with 85% Michael increasing to 100% with min-modA - cueing for increased vocal intensity required frequently   4. The patient will articulate  (l, voiced /th/ consonants at the word level- phrases level  with 90% acc with use of comp strategies and OMES  to improve speech intelligibility to > 90% acc when provided with min cues.   Current 12/13/19: progressing   /l/ initial in phrases 95% accuracy with Michael, medial/final position with 88% accuracy with Michael - cueing for increased vocal intensity required frequently   5. Patient will complete tongue base retraction,and laryngeal/pharyngeal strengthening exercises (including Sheyla maneuver, Mendelsohn maneuver, modified Shaker with CTAR ball, hard /k/ in isolation,  effortful swallow, etc.), with min cues in 5/5 sessions in order to improve the strength/agility/efficiency of his swallow for improved swallowing safety and QOL.    Current 12/13/19: progressing: modified shaker with CTAR ball with x6 with Michael for precision/accuracy, tongue pulls x15 with min-modA  6. Patient will recall/demonstrate aspiration precautions and safe swallowing strategies with 100% acc when provided with occasional cues in 5/5 sessions (small sips/bites; chin tuck with all liquid intake; alternate liquids/solids; slow rate; NO straws, Sit upright at 90 degree angle during PO trials)  to decrease the reported incidences of dysphagia and s/sx of aspiration.   Current 12/13/19: recalls compensatory strategies x100% accuracy with Michael ; utilized with po with Michael , occasional cues for small bites   7. Patient will tolerate trials of thin liquids (>4 ounces) using compensatory strategies without s/sx of aspiration/penetration when provided with Michael across 5/5 sessions. Met previously 12/4/19: ~4 oz without s/sx across session (x5/5 sessions met)  12/9/19: informally measured pt tolerates ~4oz of thin liquids without overt s/sx of aspiration-cannot rule out silent aspiration this date   8. Patient will tolerate regular solids with utilization of compensatory strategies without s/sx of aspiration/penetration or distress with Michael across 5/5 sessions.   Current 12/13/19: pt tolerates mix consistencies 5/5 trials with Michale and regular solids 4/4 trials with Michael fading to S (x4/5 sessions met)     PLAN  [x]  Continue plan of care  []  Modify Goals/Treatment Plan      []  Discharge due to:  [] Other:    SILVERIO Rich 12/13/2019  9:57 AM    Future Appointments   Date Time Provider Marc Ruvalcaba   12/13/2019 10:30 AM Steve Dumont Marie Ramirez, OTR/L [facility] [address]   12/16/2019 12:00 PM Elena Baltazar, PT [facility] [address]   12/16/2019  1:00 PM Solitario Pierce ZIVKRKY [address]   12/16/2019  1:45 PM Cortez Louis, SLP [id number] [address]   12/18/2019  2:00 PM Pricila Gottlieb PTA [facility] [address]   12/18/2019  2:45 PM Cortez Louis, SLP [facility] [address]   12/18/2019  3:30 PM Solitario Pierce [id number] [address]   12/20/2019  1:00 PM Cortez Louis, SLP IWNCFFA [address]   12/20/2019  2:00 PM Solitario Pierce LVVKEKS [address]   12/23/2019  9:00 AM Pricila Gottlieb PTA [facility] [address]   12/23/2019  9:30 AM Qiana Calico CBCVYCT [address]   12/23/2019 10:30 AM Anastasia Carrillo I, SLP [id number] [address]   12/24/2019  9:00 AM Pricila Gottlieb PTA [facility] [address]   12/24/2019  9:30 AM Qiana Calnarciso OTQDAOS [address]   12/24/2019 10:15 AM Anastasia Carrillo I, SLP [facility] [address]   12/27/2019 10:30 AM Claudetta Hickory, OTR/L [facility] [address]   12/27/2019 11:15 AM Elenita Dacosta, SLP [facility] [address]   12/30/2019  9:45 AM Cortez Louis, SLP QOGSRBR [address]   12/30/2019 10:30 AM Pricila Gottlieb, PTA [facility] [address]   12/30/2019 11:00 AM Claudetta Hickory, OTR/L [facility] [address]   12/31/2019  9:45 AM Claudetta Hickory, OTR/L [facility] [address]   12/31/2019 10:30 AM Pricila Gottlieb, PTA [facility] [address]   12/31/2019 11:30 AM Anastasia Carrillo I, SLP [facility] [address]   1/2/2020  9:45 AM Solitario Kari [id number] [address]   1/2/2020 10:30 AM Cortez Rising, SLP [id number] [address]   1/6/2020  9:45 AM Cortez Rising, SLP LNICHPD [address]   1/6/2020 10:30 AM Pricila Gottlieb, PTA [facility] [address]   1/6/2020 11:00 AM Claudetta Hickory, OTR/L [facility] [address]   1/8/2020  9:45 AM Cortez Louis, SLP SLVVADV [address]   1/8/2020 10:30 AM Pricila Gottlieb, PTA [facility] [address]   1/8/2020 11:00 AM Claudetta Hickory, OTR/L [facility] [address]   1/10/2020  9:45 AM Claudetta Hickory, OTR/L [facility] [address]   1/10/2020 10:30 AM Anastasia Carrillo I, SLP [facility] [address]   1/13/2020 10:15 AM Solitario Pierce SYMRNWU [address]   1/13/2020 11:15 AM Anastasia Carrillo I, SLP [id number] [address]   1/13/2020 12:00 PM Elena Baltazar, PT [facility]   1/15/2020  1:00 PM Sudha Oskaring, PTA [facility]   1/15/2020  1:30 PM Carlee Harish, SLP [facility]   1/15/2020  2:30 PM Nerissa Stallworth, OTR/L [facility]   1/17/2020  9:00 AM Moffat Yue [id number] [facility]   1/17/2020  9:45 AM Carlee Mehtaer, SLP [facility]   1/20/2020  9:45 AM Carlee Mehtaer, SLP [facility]   1/20/2020 10:30 AM Dionicio Polo, PT [facility]   1/20/2020 11:00 AM Moffat  [id number] [facility]   1/22/2020 10:15 AM Nerissa Stallworth, OTR/L [facility]   1/22/2020 11:00 AM Sudha Oskaring, PTA [facility]   1/22/2020 11:30 AM Carlee Moreira, SLP [id number] [facility]   1/24/2020  9:45 AM Nerissa Bull, OTR/L [facility]   1/27/2020  9:45 AM Carlee Mehtaer, SLP MMCPTP[facility]   1/27/2020 10:30 AM Dionicio Polo, PT [facility]   1/27/2020 11:00 AM Moffat Yue [id number] [facility]   1/29/2020 10:45 AM Carlee Mehtaer, SLP [id number] [facility]   1/29/2020 11:30 AM Sudha Muñoz PTA [facility]   1/29/2020 12:30 PM Dominic Donahue [id number] [facility]   1/30/2020  8:30 PM [facility] SLEEP RM 4 MMCSL [facility]   1/31/2020  9:45 AM SILVERIO Paredes AERFOLY [facility]   2/10/2020  8:30 AM Fallon Benites MD [address]   2/14/2020 10:15 AM Fam Porras MD [facility]

## 2019-12-13 NOTE — PROGRESS NOTES
OT DAILY TREATMENT NOTE 10-18    Patient Name: Sondra Campbell  Date:2019  : 1955  [x]  Patient  Verified  Payor: [place] MEDICARE / Plan: [place] MEDICARE PART A & B / Product Type: Medicare /    In time:1030  Out time:1115  Total Treatment Time (min): 45  Visit #: 1 of 12    Medicare/BCBS Only   Total Timed Codes (min):  45 1:1 Treatment Time:  45     Treatment Area: Upper extremity weakness [R29.898]  Cerebral infarction, unspecified [I63.9]    SUBJECTIVE  Pain Level (0-10 scale): 0/10  Any medication changes, allergies to medications, adverse drug reactions, diagnosis change, or new procedure performed?: [x] No    [] Yes (see summary sheet for update)  Subjective functional status/changes:   [] No changes reported  But I have to help with my left arm (re dowel ex)    OBJECTIVE    Modality rationale: increase muscle contraction/control to improve the patients ability to grasp, move wrist and hand   Min Type Additional Details    [] Estim:  []Unatt       []IFC  []Premod                        []Other:  []w/ice   []w/heat  Position:  Location:   15 [] Estim: []Att    []TENS instruct  [x]NMES                    []Other:  []w/US   []w/ice   []w/heat  Position:wrist and digit extension  Location:    []  Traction: [] Cervical       []Lumbar                       [] Prone          []Supine                       []Intermittent   []Continuous Lbs:  [] before manual  [] after manual    []  Ultrasound: []Continuous   [] Pulsed                           []1MHz   []3MHz W/cm2:  Location:    []  Iontophoresis with dexamethasone         Location: [] Take home patch   [] In clinic    []  Ice     []  heat  []  Ice massage  []  Laser   []  Anodyne Position:  Location:    []  Laser with stim  []  Other:  Position:  Location:    []  Vasopneumatic Device Pressure:       [] lo [] med [] hi   Temperature: [] lo [] med [] hi       [x] Skin assessment post-treatment:  [x]intact []redness- no adverse reaction    []redness - adverse reaction:       20 min Therapeutic Exercise:  [] See flow sheet :   Rationale: increase ROM and increase strength to improve the patients ability to move right UE volitionally  Right UE  Seated shoulder flexion x 10 reps, shoulder abd 10 reps  Supine shoulder abd x 10 reps  Weight bearing x 15 right hand    10 min Neuromuscular Re-education:  []  See flow sheet :   Rationale: increase strength  to improve the patients ability to incorporate right UE in motions  Dowel ex with 1# dowel and cuff ot hold right hand in place to facilitate active contraction of shoulder to bench press with dowel. Max cueing to allow right to participate, breathe out during activity 15 reps with hand over elbow to extend elbow fully      With   [] TE   [] TA   [] neuro   [] other: Patient Education: [x] Review HEP    [] Progressed/Changed HEP based on:   [] positioning   [] body mechanics   [] transfers   [] heat/ice application   [] Splint wear/care   [] Sensory re-education   [] scar management      [] other:            Other Objective/Functional Measures:   Seated 30 shoulder flexion and abduction right UE  Supine shoulder abd right to 45-60 degrees     Pain Level (0-10 scale) post treatment: 0/10    ASSESSMENT/Changes in Function: Improving shoulder ROM. Ongoing atrophy of right  wrist , hand and forearm muscles. Patient will continue to benefit from skilled OT services to modify and progress therapeutic interventions, address ROM deficits, address strength deficits and instruct in home and community integration to attain remaining goals. []  See Plan of Care  []  See progress note/recertification  []  See Discharge Summary         Progress towards goals / Updated goals:  *1.  Patient will be able to actively grasp and release light items with right hand to assist with self care tasks. 2.  Patient will be able to actively flex shoulder to 45 degrees to improve ability to place arm through sleeve.   Status at last visit; 27 degrees 12/13/19  3. Patient will be able to actively extend wrist to posiiton hand for grasp of items. 4.  Patient will report improved ability to use right hand to assist with home care as shown by reduced limitation of at least 20% on Neuro quality of Life. **    PLAN  []  Upgrade activities as tolerated     [x]  Continue plan of care  []  Update interventions per flow sheet       []  Discharge due to:_  []  Other:_      MALLIKA Stinson/L 12/13/2019  1:52 PM    Future Appointments   Date Time Provider Marc Ruvalcaba   12/16/2019 12:00 PM Martha Ballard, PT MMCPTPB [facility]   12/16/2019  1:00 PM Funmilayo Pruitt AEMRKSP [facility]   12/18/2019  2:00 PM Rut Loredo PTA [facility]CPTPB [facility]   12/18/2019  2:45 PM SILVERIO Epps MMCPTPB [facility]   12/18/2019  3:30 PM Funmilayo Pruitt VISJXCH [facility]   12/20/2019  1:00 PM SILVERIO Epps IZNZBJM [facility]   12/20/2019  2:00 PM Funmilayo Pruitt UFRMJKE [facility]   12/23/2019  9:00 AM Rut Loredo PTA MMCPTPB [facility]   12/23/2019  9:30 AM Kolby Diaz QPFUTAA [facility]   12/24/2019  9:00 AM Rut Loredo PTA MMCPTPB [facility]   12/24/2019  9:30 AM Kolby Diaz JFHZRVH [facility]   12/24/2019 10:15 AM SILVERIO Simmons MJOVZMQ [facility]   12/27/2019 10:30 AM MALLIKA Gonzalez/ROVERTO MMCPTPB [facility]   12/27/2019 11:15 AM SILVERIO Griffith MMCPTPB [facility]   12/30/2019  9:45 AM SILVERIO Epps DXOTRSK [facility]   12/30/2019 10:30 AM Rut Loredo, PTA MMCPTPB [facility]   12/30/2019 11:00 AM Tell Boop, OTR/L MMCPTPB [facility]   12/31/2019  9:45 AM Nishant Boop, OTR/L MMCPTPB [facility]   12/31/2019 10:30 AM Rut Loredo, PTA MMCPTPB [facility]   12/31/2019 11:30 AM Dominga Bourne I, SILVERIO NLVNFRX [facility]   1/2/2020  9:45 AM Funmilayo Pruitt ZHQJKJP [facility]   1/6/2020  9:45 AM SILVERIO Epps VXCOQNM [facility]   1/6/2020 10:30 AM Rut Loredo, PTA MMCPTPB [facility]   1/6/2020 11:00 AM Tell Boop, OTR/L MMCPTPB [facility]   1/8/2020  9:45 AM Dominga Bourne I, SLP QAZPJDU [facility]   1/8/2020 10:30 AM Kristine Jean Pierre Thomas, PTA MMCPTPB [facility]   1/8/2020 11:00 AM Ivy Minor, OTR/L MMCPTPB [facility]   1/10/2020  9:45 AM Ivy Minor, OTR/L MMCPTPB [facility]   1/13/2020 10:15 AM Lainey Cardenas OLFUJFG [facility]   1/13/2020 11:15 AM Tunde Congo I, SLP FUTWIQI [facility]   1/13/2020 12:00 PM Danielle Alves, PT MMCPTPB [facility]   1/15/2020  1:00 PM Lorie Gamez, PTA MMCPTPB [facility]   1/15/2020  1:30 PM Tundelupe Benson I, SLP MMCPTPB [facility]   1/15/2020  2:30 PM Ivy Minor, OTR/L MMCPTPB [facility]   1/17/2020  9:00 AM Lainey Cardenas BFFURKS [facility]   1/20/2020  9:45 AM Scout Diego, SLP UFPYSJY [facility]   1/20/2020 10:30 AM Danielle Alves, PT MMCPTPB [facility]   1/20/2020 11:00 AM Lainey Cardenas YYNEMUP [facility]   1/22/2020 10:15 AM Ivy Minor, OTR/L MMCPTPB [facility]   1/22/2020 11:00 AM Lorie Vera, PTA MMCPTPB [facility]   1/22/2020 11:30 AM Tunde Congo I, SLP QEAGCLL [facility]   1/24/2020  9:45 AM Ivy Minor, OTR/L MMCPTPB [facility]   1/27/2020  9:45 AM Tundelupe Benson I, SLP LCGZYPO [facility]   1/27/2020 10:30 AM Danielle Alves, PT MMCPTPB [facility]   1/27/2020 11:00 AM Lainey Cardenas JTNYGUX [facility]   1/29/2020 10:45 AM Scout Diego, SILVERIO HTILPTH [facility]   1/29/2020 11:30 AM Lorie Gamez, PTA MMCPTPB [facility]   1/29/2020 12:30 PM Lainey Cardenas CBLSTYM [facility]   1/30/2020  8:30 PM [facility] SLEEP RM 4 MMCSL [facility]   2/10/2020  8:30 AM Cruz Meckel, MD [address]   2/14/2020 10:15 AM Whitney Melo MD [address] [facility]

## 2019-12-16 ENCOUNTER — HOSPITAL ENCOUNTER (OUTPATIENT)
Dept: PHYSICAL THERAPY | Age: 64
Discharge: HOME | End: 2019-12-16
Payer: MEDICARE

## 2019-12-16 ENCOUNTER — APPOINTMENT (OUTPATIENT)
Dept: PHYSICAL THERAPY | Age: 64
End: 2019-12-16
Payer: MEDICARE

## 2019-12-16 PROCEDURE — 97110 THERAPEUTIC EXERCISES: CPT

## 2019-12-16 PROCEDURE — 97032 APPL MODALITY 1+ESTIM EA 15: CPT

## 2019-12-16 NOTE — PROGRESS NOTES
OT DAILY TREATMENT NOTE 10-18    Patient Name: Monika Bell  Date:2019  : 1955  [x]  Patient  Verified  Payor: [place] MEDICARE / Plan: [place] MEDICARE PART A & B / Product Type: Medicare /    In time:100  Out time:140  Total Treatment Time (min): 40  Visit #: 2 of 12    Medicare/BCBS Only   Total Timed Codes (min):  40 1:1 Treatment Time:  40     Treatment Area: Upper extremity weakness [R29.898]  Cerebral infarction, unspecified [I63.9]    SUBJECTIVE  Pain Level (0-10 scale): 0/10  Any medication changes, allergies to medications, adverse drug reactions, diagnosis change, or new procedure performed?: [x] No    [] Yes (see summary sheet for update)  Subjective functional status/changes:   [] No changes reported  My arm has been shaking a lot over the weekend. OBJECTIVE            Modality rationale: increase muscle contraction/control to improve the patients ability to grasp, move wrist and hand   Min Type Additional Details      []? Estim:  []? Unatt       []? IFC  []? Premod                        []?Other:  []?w/ice   []?w/heat  Position:  Location:    15 []? Estim: []? Att    []? TENS instruct  [x]? NMES                    []?Other:  []?w/US   []?w/ice   []?w/heat  Position:wrist and digit extension  Location:      []? Traction: []? Cervical       []? Lumbar                       []? Prone          []? Supine                       []?Intermittent   []? Continuous Lbs:  []? before manual  []? after manual      []? Ultrasound: []? Continuous   []? Pulsed                           []? 1MHz   []? 3MHz W/cm2:  Location:      []? Iontophoresis with dexamethasone         Location: []? Take home patch   []? In clinic      []? Ice     []?  heat  []? Ice massage  []? Laser   []? Anodyne Position:  Location:      []? Laser with stim  []? Other:  Position:  Location:      []? Vasopneumatic Device Pressure:       []? lo []? med []? hi   Temperature: []? lo []? med []? hi       [x]?  Skin assessment post-treatment: [x]?intact []? redness- no adverse reaction  []? redness - adverse reaction:     25 min Therapeutic Exercise:  [] See flow sheet :   Rationale: increase ROM and increase strength to improve the patients ability to reach, functionally use Right UE    Seated: Shrugs/Retraction   Floor Reach   Weight Bearing onto forearm/hand   BUE Shoulder Flexion, Horizontal Abduction, Protraction    Supine:   AAROM Abduction/Adduction   Elbow Flexion/Extension    Shoulder Flexion with hold with facilitation     Sidelying:    Shoulder flexion on powder board   Protraction/retraction on powder board         With   [] TE   [] TA   [] neuro   [] other: Patient Education: [x] Review HEP    [] Progressed/Changed HEP based on:   [] positioning   [] body mechanics   [] transfers   [] heat/ice application   [] Splint wear/care   [] Sensory re-education   [] scar management      [] other:            Other Objective/Functional Measures:     Max cueing to breathe required during all exercises on this date  Increased tremors noted       Pain Level (0-10 scale) post treatment: 0/10    ASSESSMENT/Changes in Function: increased difficulty with tasks on this date     Patient will continue to benefit from skilled OT services to modify and progress therapeutic interventions, address ROM deficits, address strength deficits and instruct in home and community integration to attain remaining goals. []  See Plan of Care  []  See progress note/recertification  []  See Discharge Summary         Progress towards goals / Updated goals:  1.  Patient will be able to actively grasp and release light items with right hand to assist with self care tasks. 2.  Patient will be able to actively flex shoulder to 45 degrees to improve ability to place arm through sleeve.   Status at last visit; 30 degrees 12/13/19 progressing 12/16/18    3.  Patient will be able to actively extend wrist to posiiton hand for grasp of items.      4.  Patient will report improved ability to use right hand to assist with home care as shown by reduced limitation of at least 20% on Neuro quality of Life    PLAN  []  Upgrade activities as tolerated     [x]  Continue plan of care  []  Update interventions per flow sheet       []  Discharge due to:_  []  Other:_      DARELL Washburn 12/16/2019  11:30 AM    Future Appointments   Date Time Provider Marc Peg   12/16/2019 12:00 PM Maddy Moseley, PT MMCPTPB [facility]   12/16/2019  1:00 PM Do Belll PJHDATL [facility]   12/18/2019  2:00 PM Guillermo Nelson, PTA MMCPTPB [facility]   12/18/2019  2:45 PM SILVERIO Emmanuel MMCPTPB [facility]   12/18/2019  3:30 PM Do Lazcano ENIUBQS [facility]   12/20/2019  1:00 PM SILVERIO Emmanuel MMCPTPB [facility]   12/20/2019  2:00 PM Do Lazcano WVCAWRP [facility]   12/23/2019  9:00 AM Guillermo Isaacer, PTA MMCPTPB [facility]   12/23/2019  9:30 AM Euna Sessions ALBFXDN [facility]   12/24/2019  9:00 AM Akashe Omar, PTA MMCPTPB [facility]   12/24/2019  9:30 AM Euna Sessions ZAKDUDG [facility]   12/24/2019 10:15 AM SILVERIO Hooper MMCPTPB [facility]   12/27/2019 10:30 AM MALLIKA Colon/NOELLE MMCPTPB [facility]   12/27/2019 11:15 AM René Pierini, SLP MMCPTPB [facility]   12/30/2019  9:45 AM Cici Barillas, SLP MMCPTPB [facility]   12/30/2019 10:30 AM Tempie Omar, PTA MMCPTPB [facility]   12/30/2019 11:00 AM Poland Cord, OTR/L MMCPTPB [facility]   12/31/2019  9:45 AM Dejan Cord, OTR/L MMCPTPB [facility]   12/31/2019 10:30 AM Tempie Omar, PTA MMCPTPB [facility]   12/31/2019 11:30 AM Laina Dunlap I SLP MMCPTPB [facility]   1/2/2020  9:45 AM Do Bellnoelle ZRBFCYK [facility]   1/6/2020  9:45 AM Cici Barillas, SLP MMCPTPB [facility]   1/6/2020 10:30 AM Tempie Omar, PTA MMCPTPB [facility]   1/6/2020 11:00 AM Deajn Cord, OTR/L MMCPTPB [facility]   1/8/2020  9:45 AM Cici Barillas, SLP MMCPTPB [facility]   1/8/2020 10:30 AM Tempie Omar, PTA MMCPTPB [facility]   1/8/2020 11:00 AM Dejan Cord, OTR/L MMCPTPB [facility]   1/10/2020  9:45 AM Poland Cord, OTR/L MMCPTPB [facility]   1/13/2020 10:15 AM Solitario Pierce PLHTWCT [facility]   1/13/2020 11:15 AM Anastasia Carrillo I, SLP BCOMWZW [facility]   1/13/2020 12:00 PM Elena Baltazar, PT MMCPTPB [facility]   1/15/2020  1:00 PM Pricila Gottlieb, PTA MMCPTPB [facility]   1/15/2020  1:30 PM Anastasia Carrillo I, SLP MMCPTPB [facility]   1/15/2020  2:30 PM Claudetta Hickory, OTR/L MMCPTPB [facility]   1/17/2020  9:00 AM Solitario Pierce DRGNIHA [facility]   1/20/2020  9:45 AM Cortez Louis, SLP FMGXADI [facility]   1/20/2020 10:30 AM Elena Baltazar, PT MMCPTPB [facility]   1/20/2020 11:00 AM Solitario Kari HDVYUMX [facility]   1/22/2020 10:15 AM Claudetta Hickory, OTR/L MMCPTPB [facility]   1/22/2020 11:00 AM Pricila Gottlieb, PTA MMCPTPB [facility]   1/22/2020 11:30 AM Anastasia Carrillo I, SLP HMPQPXH [facility]   1/24/2020  9:45 AM Claudetta Hickory, OTR/L MMCPTPB [facility]   1/27/2020  9:45 AM Anastasia Carrillo I, SLP GSPZVHA [facility]   1/27/2020 10:30 AM Elena Baltazar, PT MMCPTPB [facility]   1/27/2020 11:00 AM Solitario Pierce QTEVGXH [facility]   1/29/2020 10:45 AM SILVERIO Garza JWXTNGI [facility]   1/29/2020 11:30 AM Pricila Gottlieb PTA MMCPTPB [facility]   1/29/2020 12:30 PM Solitario Pierce ZZSMIJA [facility]   2/10/2020  8:30 AM Néstor Worley MD [address]   2/14/2020 10:15 AM MD [facility]

## 2019-12-16 NOTE — PROGRESS NOTES
PT DAILY TREATMENT NOTE 10-18    Patient Name: Pati Anand  Date:2019  : 1955  [x]  Patient  Verified  Payor: [place] MEDICARE / Plan: [place] MEDICARE PART A & B / Product Type: Medicare /    In YBIX:[id number]  Out time:1235  Total Treatment Time (min): 38  Visit #: 6 of 12    Medicare/BCBS Only   Total Timed Codes (min):  38 1:1 Treatment Time:  38       Treatment Area: Lower extremity weakness [R29.898]  Right hemiparesis (HCC) [G81.91]    SUBJECTIVE  Pain Level (0-10 scale): 0/10  Any medication changes, allergies to medications, adverse drug reactions, diagnosis change, or new procedure performed?: [x] No    [] Yes (see summary sheet for update)  Subjective functional status/changes:   [] No changes reported  Pt reports he would like to eventually get rid of his cane. He is happy with his progress. OBJECTIVE    38 min Therapeutic Activity:  []  See flow sheet :   Rationale: increase ROM, increase strength, improve coordination and improve balance  to improve the patients ability to ease with ADL's         With   [] TE   [] TA   [] neuro   [] other: Patient Education: [x] Review HEP    [] Progressed/Changed HEP based on:   [] positioning   [] body mechanics   [] transfers   [] heat/ice application    [] other:      Other Objective/Functional Measures: Worked on right hip hiking  Off a 6 inch step. Added ankle orange TB ex's, DF/PF and educated for HEP daily. Increased to4#  Fatigued during HS curls with purple TB. Pain Level (0-10 scale) post treatment: 0/10    ASSESSMENT/Changes in Function: Progressing slow and steady. Pt reported he went to a restaurant that required him to walk with a cane.      Patient will continue to benefit from skilled PT services to modify and progress therapeutic interventions, address functional mobility deficits, address ROM deficits, address strength deficits, analyze and address soft tissue restrictions, analyze and cue movement patterns, analyze and modify body mechanics/ergonomics and assess and modify postural abnormalities to attain remaining goals. [x]  See Plan of Care  []  See progress note/recertification  []  See Discharge Summary         Progress towards goals / Updated goals:  1. Pt will ambulate 4 steps x 3 with 1 HR with supervision to be able to navigate stairs at home.                2. Pt will increase FOTO score by 7 pts to improve LE function.                3. Pt will increase right LE hip, quad and HS strength to 4/5 or greater to be able to navigate community distances.   Slowly progressing. 12/2/19                4. Pt will demonstrate Romberg EO/EC on a compliant surface to decrease fall risk during ADL's.                5. Pt will ambulate with LRAD >150 ft including an obstacle course to be able to navigate short community distances safely.    Met/Progressing. Pt wants to work on increasing endurance.  12/9/19  PLAN  [x]  Upgrade activities as tolerated     [x]  Continue plan of care  []  Update interventions per flow sheet       []  Discharge due to:_  []  Other:_      Kira Rubio PT 12/16/2019  8:58 AM    Future Appointments   Date Time Provider Marc Ruvalcaba   12/16/2019 12:00 PM Polly Ramirez PT [facility]CPTP[facility]   12/16/2019  1:00 PM [place] NAGEL [facility]   12/16/2019  8:30 PM [facility] SLEEP RM 3 MMCSL [facility]   12/18/2019  2:00 PM Rina Gunter PTA [facility]   12/18/2019  2:45 PM SILVERIO Dickson XZLASAJ [facility]   12/18/2019  3:30 PM [place] Fonseca VDGVQGS [facility]   12/20/2019  1:00 PM SILVERIO Dickson BRLWIUD [facility]   12/20/2019  2:00 PM [place] Fonseca BRAATEV [facility]   12/23/2019  9:00 AM Rina Gunter PTA [facility]   12/23/2019  9:30 AM Aline Joshi WPRWSJA [facility]   12/24/2019  9:00 AM Rina Gunter PTA [facility]   12/24/2019  9:30 AM Aline Joshi EXYSEQW [facility]   12/24/2019 10:15 AM SILVERIO Dickson WUDCLUP [facility]   12/27/2019 10:30 AM Rolly Harris OTR/L [facility]CP[facility]   12/27/2019 11:15 AM Dharmesh Adolfo Allison, SILVERIO MMCPTPB [facility]   12/30/2019  9:45 AM SILVERIO Ramos HZSDPBJ [facility]   12/30/2019 10:30 AM Chaparro Mitchell, PTA [facility]   12/30/2019 11:00 AM Jonda Cynthia, OTR/L [facility]   12/31/2019  9:45 AM Chuyita Marie, OTR/L [facility]   12/31/2019 10:30 AM Chaparro Mitchell, PTA [facility]   12/31/2019 11:30 AM SILVERIO Wynn MMCPTPB [facility]   1/2/2020  9:45 AM Shan Audubon JBOYFFP [facility]   1/6/2020  9:45 AM SILVERIO Ramos EIAZYOA [facility]   1/6/2020 10:30 AM Chaparro Mitchell, PTA [facility]CPTP[facility]   1/6/2020 11:00 AM Chuyita Marie, OTR/L [facility]CPTPB [facility]   1/8/2020  9:45 AM SILVERIO Ramos VGGZGRE [facility]   1/8/2020 10:30 AM Chaparro Mitchell, PTA [facility]CPTPB [facility]   1/8/2020 11:00 AM Jonda Sand, OTR/L [facility]CP[facility]   1/10/2020  9:45 AM Jonda Sand, OTR/L [facility]CPTPB [facility]   1/13/2020 10:15 AM Shan Audubon PAYDOMT [facility]   1/13/2020 11:15 AM Zully Fabio I, SLP XACPYTE [facility]   1/13/2020 12:00 PM Paris Valero, PT [facility]CPTPB [facility]   1/15/2020  1:00 PM Chaparro Mitchell, PTA [facility]   1/15/2020  1:30 PM Zully Mccarthy I, SLP [facility]CPTPB [facility]   1/15/2020  2:30 PM Chuyita Marie, OTR/L [facility]CPTPB [facility]   1/17/2020  9:00 AM Shan Audubon ZVTFNTY [facility]   1/20/2020  9:45 AM SILVERIO Ramos DLZUMHY [facility]   1/20/2020 10:30 AM Paris Valero, PT MMCPTPB [facility]   1/20/2020 11:00 AM Shan Audubon TIRTCEO [facility]   1/22/2020 10:15 AM Chuyita Marie, OTR/L MMCPTPB [facility]   1/22/2020 11:00 AM Chaparro Mitchell, PTA [facility]CPTPB [facility]   1/22/2020 11:30 AM Zully Mccarthy I, SLP FYGABBB [facility]   1/24/2020  9:45 AM Chuyita Marie, OTR/L [facility]CPTPB [facility]   1/27/2020  9:45 AM Zully Fabio I, SLP REPPRFB [facility]   1/27/2020 10:30 AM Paris Valero, PT [facility]CPTPB [facility]   1/27/2020 11:00 AM Shan Audubon AJHLMWM [facility]   1/29/2020 10:45 AM SILVERIO Ramos HYRYMVH [facility]   1/29/2020 11:30 AM Chaparro Mitchell PTA MMCPTPB [facility]   1/29/2020 12:30 PM Shan Audubon ABEFBPM [facility]   2/10/2020  8:30 AM Berny Villalobos MD [address]   2/14/2020 10:15 AM MD Beck Reed

## 2019-12-17 ENCOUNTER — DOCUMENTATION ONLY (OUTPATIENT)
Dept: NEUROLOGY | Age: 64
End: 2019-12-17

## 2019-12-18 ENCOUNTER — HOSPITAL ENCOUNTER (OUTPATIENT)
Dept: PHYSICAL THERAPY | Age: 64
Discharge: HOME | End: 2019-12-18
Payer: MEDICARE

## 2019-12-18 PROCEDURE — 92507 TX SP LANG VOICE COMM INDIV: CPT

## 2019-12-18 PROCEDURE — 92526 ORAL FUNCTION THERAPY: CPT

## 2019-12-18 PROCEDURE — 97112 NEUROMUSCULAR REEDUCATION: CPT

## 2019-12-18 PROCEDURE — 97110 THERAPEUTIC EXERCISES: CPT

## 2019-12-18 PROCEDURE — 97032 APPL MODALITY 1+ESTIM EA 15: CPT

## 2019-12-18 NOTE — PROGRESS NOTES
[facility] [employer] [facility]  [address] [place] [address]  [phone] fax    Medicare Progress Report  Patient name: Deon Salamanca Start of Care: 10/23/2019   Referral source: Thelma Crockett NP : 1955               Medical Diagnosis: Cerebral infarction, unspecified [I63.9]  Payor: [place] MEDICARE / Plan: [place] MEDICARE PART A & B / Product Type: Medicare /  Onset Date:2019               Treatment Diagnosis: R13.12 Oropharyngeal dysphagia   Prior Hospitalization: see medical history Provider#: [id number]   Medications: Verified on Patient summary List    Comorbidities: Acute Ischemic Stroke (acute/early subacute infarct at the L posterior basal ganglia to corona radiata with residual R hemiparesis), Dysphagia, Dysarthria, Diabetes, Obstructive Sleep Apnea on CPAP, HBP, Kidney Disease  Prior Level of Function: Speech-language, swallowing, cognition, and voice WNL; regular diet with thin liquids    Visits from Start of Care: 20    Missed Visits: 0    Reporting Period: 19 to 19    Subjective Reports: \"My voice is stronger\" ; Pt denies recent coughing during meals or s/sx of aspiration during po intake. Goal: 1. The pt will increase quality and length of phonation by sustaining ah utilizing effective diaphragmatic breath support for >10 seconds in 3/3 sessions to increase functional speech intelligibility.   Status at last note/certification: Not met: progressing towards goal: diaphragmatic breathing in /coordinations with verbal tasks/vowel prolongation duration: range: ~7-11 secs with modA shaping for easy onsets to decrease vocal strain  Current Status: Met: x3/3 sessions met;  x5 trials (1) ~11 seconds, (2) ~10 seconds , (3) ~13 seconds  (4) ~12 seconds (5) ~11 seconds     Goal: 2. Pt will demonstrate ability to produce improve prosody, rhyme, rhythm in structured sentences, word emphasis tasks, and varying pitch phrases using various comp strategies with 80% acc given min-mod cues in 3/3 opportunities to improve naturalness of speech. Status at last note/certification: Not met: stress word at the sentence level with different words in the   Current Status: Met: 85% accuracy with Michael x3/3 sessions     Goal: 3. The patient will articulate (s/z) consonants at the word level- phrases level with 90% acc with use of comp strategies and OMES  to improve speech intelligibility to > 90% acc when provided with min cues. Status at last note/certification:  Not met: progressing towards goal: /s/ in multi-syllabic words  in the initial position with 85% accuracy with Michael . /z/ initial position with 80% with Michael; medial/final position 90% with Michael  Current Status: Not met, progressing: /s/ in phrases with 85% Michael increasing to 100% with min-modA    Goal: 4. The patient will articulate  (l, voiced /th/ consonants at the word level- phrases level  with 90% acc with use of comp strategies and OMES  to improve speech intelligibility to > 90% acc when provided with min cues.   Status at last note/certification: Not met: progressing towards goal: /l/ in sentences with 78% accuracy with Michael, voice /th/ in phrases 88-90% accuracy    Current Status: Met, increase complexity sentence-conversational level    Goal: 5. Patient will complete tongue base retraction,and laryngeal/pharyngeal strengthening exercises (including Sheyla maneuver, Mendelsohn maneuver, modified Shaker with CTAR ball, hard /k/ in isolation,  effortful swallow, etc.), with min cues in 5/5 sessions in order to improve the strength/agility/efficiency of his swallow for improved swallowing safety and QOL.   Status at last note/certification:  Not met: progressing towards goal:  pt completes high /ee/ x20 with min to modA to increase precision, mendelsohn maneuver 2-3 sec holds x15 with modA,, hard /k/ x56 Michael; modified shaker with CTAR ball x20 5 sec holds, x5 extended 60 sec holds with Michael    Current Status: Not met, progressing: modified shaker with CTAR ball with x6 with Michael for precision/accuracy, tongue pulls x15 with min-modA, hard /k/ words x56, x2 sets; rickey x20 with Michael for precision; high \"ee\" x25 with modA x3 breaks (~30-60 sec), mendelsohn x20 with min-modA    Goal: 6. Patient will recall/demonstrate aspiration precautions and safe swallowing strategies with 100% acc when provided with occasional cues in 5/5 sessions (small sips/bites; chin tuck with all liquid intake; alternate liquids/solids; slow rate; NO straws, Sit upright at 90 degree angle during PO trials)  to decrease the reported incidences of dysphagia and s/sx of aspiration.   Status at last note/certification: Not met: progressing towards goal: Recalled strategies with 100% with min-mod cues. No cues required for chin tuck this date with continued improvement utilizing strategies with po with Michael    Current Status: Not met, progressing: utilizing with po with Michael, approaching goal    Goal:  7. Patient will tolerate trials of thin liquids (>4 ounces) using compensatory strategies without s/sx of aspiration/penetration when provided with Michael across 5/5 sessions. Status at last note/certification: Not met: progressing towards goal: x1 throat clear post swallow likely d/t pt sloshing water around mouth prior to swallow decreasing bolus control - pt education provided for decreased bolus control. Pt tolerates ~3oz of additional trials of water without overt s/sx of aspiration with Michael, however cannot rule out silent aspiration  - continued improvement taking small sips at a slower rate and decreased cueing for chin tuck required  Current Status: Met: ~4 oz without s/sx across session (x5/5 sessions met)     Goal:  8. Patient will tolerate regular solids with utilization of compensatory strategies without s/sx of aspiration/penetration or distress with Michael across 5/5 sessions.   Status at last note/certification:  Not met: progressing towards goal: tolerates 5/5 trials of regular solids utilizing compensatory strategies with min cues - mild pocketing and oral status post swallow - pt utilized lingual sweep to clear pocketing in R buccal area  x2 of 3 sessions met. Current Status: Met: x5/5 sessions without overt s/sx of aspiration - cannot rule out silent aspiration     Key functional changes:   Pt making functional gains in treatment for dysphagia and dysarthria. Pt met 5 of 8 short-term goals and is progressing towards att additional STGs. He is making significant gains in use of compensatory strategies during po trials to increase safety and decrease occurrence of dysphagia. He is progressing in oropharyngeal strength and endurance, however continues to benefit from skilled cueing to increase precision and accuracy. Pt is progressing in breath support/coordination and articulatory precision at the word to phrase level, however benefits from skilled training for carry over at the conversational level. Additionally, pt requires cueing to increase vocal intensity to improve overall speech intelligibility. Problems/ barriers to goal attainment: none      Assessment / Recommendations:   is progressing in treatment for dysphagia and dysarthria. It is recommended he continue to receive skilled speech therapy services as it is medically necessary to improve speech intelligibility, socialization, safety and efficiency of oropharyngeal swallow, and overall QOL    Problem List:    []aphasic  [x]dysarthric  [x]dysphagic       []alexic  []agraphic  []dysphonia       []dysfluency   []Cognitive-Linguistic Disorder       []other        Treatment Plan: Dysarthria Treatment, Dysphagia Treatment, Treatment of Swallowing and Patient Education    Patient Goal (s) has been updated and includes: increase complexity STG x1.     Updated Goals to be accomplished in 4 weeks:  1. The pt will increase quality and length of phonation by sustaining ah utilizing effective diaphragmatic breath support for >15 seconds when provided with min-modA in 3/3 sessions to increase functional speech intelligibility. 2. The patient will articulate (s/z) consonants at the word level- phrases level with 90% acc with use of comp strategies and OMES  to improve speech intelligibility to > 90% acc when provided with min cues. 3. The patient will articulate  (l, voiced /th/ consonants at the sentence to conversational level  with 90% acc with use of comp strategies and OMES  to improve speech intelligibility to > 90% acc when provided with min cues. 4.  Pt will demonstrate adequate vocal intensity in varying levels from whisper to yelling with 85% accuracy in structured tasks at the sentence to conversational level when provided with min cues. 5. Patient will complete tongue base retraction,and laryngeal/pharyngeal strengthening exercises (including Sheyla maneuver, Mendelsohn maneuver, modified Shaker with CTAR ball, hard /k/ in isolation,  effortful swallow, etc.), with min cues in 5/5 sessions in order to improve the strength/agility/efficiency of his swallow for improved swallowing safety and QOL.    6. Patient will recall/demonstrate aspiration precautions and safe swallowing strategies with 100% acc when provided with occasional cues in 5/5 sessions (small sips/bites; chin tuck with all liquid intake; alternate liquids/solids; slow rate; NO straws, Sit upright at 90 degree angle during PO trials)  to decrease the reported incidences of dysphagia and s/sx of aspiration.       Frequency / Duration: Patient to be seen 2 times per week for 4 weeks:      Olive Maharaj SLP 12/18/2019 11:40 AM  MS CCC-SLP  Speech-Language Pathologist

## 2019-12-18 NOTE — PROGRESS NOTES
OT DAILY TREATMENT NOTE 10-18    Patient Name: Ana Cap  Date:2019  : 1955  [x]  Patient  Verified  Payor: [place] MEDICARE / Plan: [place] MEDICARE PART A & B / Product Type: Medicare /    In time:330  Out time:415  Total Treatment Time (min): 45  Visit #: 3 of 12    Medicare/BCBS Only   Total Timed Codes (min):  45 1:1 Treatment Time:  39     Treatment Area: Upper extremity weakness [R29.898]  Cerebral infarction, unspecified [I63.9]    SUBJECTIVE  Pain Level (0-10 scale): 0/10  Any medication changes, allergies to medications, adverse drug reactions, diagnosis change, or new procedure performed?: [x] No    [] Yes (see summary sheet for update)  Subjective functional status/changes:   [] No changes reported  I don't think I can do this myself (NMES)    OBJECTIVE    Modality rationale: increase muscle contraction/control to improve the patients ability to extend wrist   Min Type Additional Details    [] Estim:  []Unatt       []IFC  []Premod                        []Other:  []w/ice   []w/heat  Position:  Location:     15 [] Estim: []Att    []TENS instruct  [x]NMES                    []Other:  []w/US   []w/ice   []w/heat  Position:wrist ext with home unit  Location:    []  Traction: [] Cervical       []Lumbar                       [] Prone          []Supine                       []Intermittent   []Continuous Lbs:  [] before manual  [] after manual    []  Ultrasound: []Continuous   [] Pulsed                           []1MHz   []3MHz W/cm2:  Location:    []  Iontophoresis with dexamethasone         Location: [] Take home patch   [] In clinic    []  Ice     []  heat  []  Ice massage  []  Laser   []  Anodyne Position:  Location:    []  Laser with stim  []  Other:  Position:  Location:    []  Vasopneumatic Device Pressure:       [] lo [] med [] hi   Temperature: [] lo [] med [] hi       [x] Skin assessment post-treatment:  [x]intact []redness- no adverse reaction    []redness - adverse reaction:        30 min Neuromuscular Re-education:  []  See flow sheet :   Rationale: increase ROM and increase strength  to improve the patients ability to move right arm volitionally  Seated elbow flex ext to label on shirt x 10 tirals with poor performance on later trials  Seated hand place on one knee then the other x 10 trials with difficulty achieving adequate elbow flexion when abducting horizontally  Supine place and hold shoulder flexion, then circles, crosses, etc    With   [] TE   [] TA   [] neuro   [] other: Patient Education: [x] Review HEP    [] Progressed/Changed HEP based on: supine place and hold with circles, hand knee to knee  [] positioning   [] body mechanics   [] transfers   [] heat/ice application   [] Splint wear/care   [] Sensory re-education   [] scar management      [] other: use of NMES home unit 2x day 30 mins each           Other Objective/Functional Measures:   Able to keep shoulder in flexion for over one minute today with small movements of shoulder with control and no loss of elbow extension     Pain Level (0-10 scale) post treatment: 0/10    ASSESSMENT/Changes in Function: Improving shoulder control    Patient will continue to benefit from skilled OT services to modify and progress therapeutic interventions, address ROM deficits, address strength deficits, analyze and cue movement patterns and instruct in home and community integration to attain remaining goals. []  See Plan of Care  []  See progress note/recertification  []  See Discharge Summary         Progress towards goals / Updated goals:  *1.  Patient will be able to actively grasp and release light items with right hand to assist with self care tasks.     2.  Patient will be able to actively flex shoulder to 45 degrees to improve ability to place arm through sleeve.   Status at last visit; 30 degrees 12/13/19 progressing 12/16/18, able to keep in flexion in supine for more than one minute 12/18/19     3.  Patient will be able to actively extend wrist to posiiton hand for grasp of items.       4.  Patient will report improved ability to use right hand to assist with home care as shown by reduced limitation of at least 20% on Neuro quality of Life**    PLAN  []  Upgrade activities as tolerated     [x]  Continue plan of care  []  Update interventions per flow sheet       []  Discharge due to:_  []  Other:_      [place] Cactus, OTR/L 12/18/2019  4:43 PM    Future Appointments   Date Time Provider Marc Ruvalcaba   12/20/2019  1:00 PM Ruben Alston I, SLP MMCPTPB [facility]   12/20/2019  2:00 PM [facility] AIXHHYB [facility]   12/23/2019  9:00 AM Romana Rea PTA MMCPTPB [facility]   12/23/2019  9:30 AM [facility] LZXIAFN [facility]   12/24/2019  9:00 AM Romana Rea PTA MMCPTPB [facility]   12/24/2019  9:30 AM Shane Martínez MJLWYKJ [facility]   12/24/2019 10:15 AM SILVERIO Edwards MMCPTPB [facility]   12/27/2019 10:30 AM Giselle Ramos, OTR/L MMCPTPB [facility]   12/27/2019 11:15 AM Flor Staton SLP MMCPTPB [facility]   12/30/2019  9:45 AM SILVERIO Stout MMCPTPB [facility]   12/30/2019 10:30 AM Romana Rea PTA MMCPTPB [facility]   12/30/2019 11:00 AM Giselle Ramos, OTR/L MMCPTPB [facility]   12/31/2019  9:45 AM Giselle Ramos, OTR/L MMCPTPB [facility]   12/31/2019 10:30 AM Romana Rea PTA MMCPTPB [facility]   12/31/2019 11:30 AM Ruben Alston I, SILVERIO MMCPTPB [facility]   1/2/2020  9:45 AM Lainey Rojo OGUFYVE [facility]   1/6/2020  9:45 AM SILVERIO Stout MMCPTPB [facility]   1/6/2020 10:30 AM Romana Rea, LINDA MMCPTPB [facility]   1/6/2020 11:00 AM Giselle Ramos, OTR/L MMCPTPB [facility]   1/8/2020  9:45 AM SILVERIO Stout MMCPTPB [facility]   1/8/2020 10:30 AM Romana Rea, LINDA MMCPTPB [facility]   1/8/2020 11:00 AM Giselle Ramos, OTR/L MMCPTPB [facility]   1/10/2020  9:45 AM Giselle Ramos, OTR/L MMCPTPB [facility]   1/13/2020 10:15 AM Lainey STEPHENSIROLF [facility]   1/13/2020 11:15 AM Ruben Alston I, SILVERIO MMCPTPB [facility]   1/13/2020 12:00 PM [place] Hurtado, PT MMCPTPB [facility]   1/15/2020  1:00 PM Romana Rea PTA [id number] [facility]   1/15/2020  1:30 PM Odilon Gann I, SLP MMCPTPB [facility]   1/15/2020  2:30 PM Levonia Ohm, OTR/L MMCPTPB [facility]   1/17/2020  9:00 AM Severiano Reddish IEFQSWG [facility]   1/20/2020  9:45 AM José Miguel Reese, SILVERIO ZVSQLUR [facility]   1/20/2020 10:30 AM Gerardine Gastelum, PT MMCPTPB [facility]   1/20/2020 11:00 AM Severiano Reddish ZANMZZH [facility]   1/22/2020 10:15 AM Levonia Ohm, OTR/L MMCPTPB [facility]   1/22/2020 11:00 AM [place] Cheeks, PTA MMCPTPB [facility]   1/22/2020 11:30 AM Odilon Gann I, SLP KHGLGND [facility]   1/24/2020  9:45 AM Levonia Ohm, OTR/L MMCPTPB [facility]   1/27/2020  9:45 AM Odilon Gann I, SLP EOTKCPF [facility]   1/27/2020 10:30 AM Gerardine Gastelum, PT MMCPTPB [facility]   1/27/2020 11:00 AM Severiano Reddish RQBINZK [facility]   1/29/2020 10:45 AM José Miguel Reese, SLP QYSQUJF [facility]   1/29/2020 11:30 AM [place] Cheeks, PTA MMCPTPB [facility]   1/29/2020 12:30 PM Severiano Reddish [id number] [facility]   2/10/2020  8:30 AM Felisa Perez MD [address]

## 2019-12-18 NOTE — PROGRESS NOTES
PT DAILY TREATMENT NOTE 10-18    Patient Name: Lucas Rose  Date:2019  : 1955  [x]  Patient  Verified  Payor: [place] MEDICARE / Plan: [place] MEDICARE PART A & B / Product Type: Medicare /    In time:155  Out time:230  Total Treatment Time (min): 35  Visit #: 7 of 12    Medicare/BCBS Only   Total Timed Codes (min):  35 1:1 Treatment Time:  35       Treatment Area: Lower extremity weakness [R29.898]  Hemiplegia, unspecified affecting right dominant side [G81.91]    SUBJECTIVE  Pain Level (0-10 scale): 0/10  Any medication changes, allergies to medications, adverse drug reactions, diagnosis change, or new procedure performed?: [x] No    [] Yes (see summary sheet for update)  Subjective functional status/changes:   [] No changes reported  Pt stated that he is doing pretty good today    OBJECTIVE    35 min Therapeutic Exercise:  [x] See flow sheet :   Rationale: increase ROM and increase strength to improve the patients ability to increase ease with walking and performing ADLs    With   [x] TE   [] TA   [] neuro   [] other: Patient Education: [x] Review HEP    [] Progressed/Changed HEP based on:   [] positioning   [] body mechanics   [] transfers   [] heat/ice application    [] other:      Other Objective/Functional Measures:   Had no difficulty with 6\" step ups today  Was able to perform 12 SLR with 1# having no difficulty  No difficulty with sit to stands with right foot back     Pain Level (0-10 scale) post treatment: 0/10    ASSESSMENT/Changes in Function:   Pt is progressing well toward goals. Strength in B LE is improving. Pt cont to ambulate with SBQC. Static balance is improving.  Cont to have some difficulty with Romberg EC on complaint surface    Patient will continue to benefit from skilled PT services to modify and progress therapeutic interventions, address functional mobility deficits, address ROM deficits, address strength deficits, analyze and cue movement patterns, analyze and modify body mechanics/ergonomics, assess and modify postural abnormalities, address imbalance/dizziness and instruct in home and community integration to attain remaining goals. []  See Plan of Care  [x]  See progress note/recertification  []  See Discharge Summary         Progress towards goals / Updated goals:  1. Pt will ambulate 4 steps x 3 with 1 HR with supervision to be able to navigate stairs at home.                2. Pt will increase FOTO score by 7 pts to improve LE function.                3. Pt will increase right LE hip, quad and HS strength to 4/5 or greater to be able to navigate community distances.   Slowly progressing. 12/2/19                4. Pt will demonstrate Romberg EO/EC on a compliant surface to decrease fall risk during ADL's.   Progressing. Pt is able to maintain balance on foam with EO, but cont to have moderate difficulty with EC. 12/18/19               5. Pt will ambulate with LRAD >150 ft including an obstacle course to be able to navigate short community distances safely.    Met/Progressing. Pt wants to work on increasing endurance.  12/9/19    PLAN  []  Upgrade activities as tolerated     [x]  Continue plan of care  []  Update interventions per flow sheet       []  Discharge due to:_  []  Other:_      Esha Inman PTA 12/18/2019  2:02 PM    Future Appointments   Date Time Provider Marc Ruvalcaba   12/18/2019  2:45 PM SILVERIO Esqueda ROBMGIQ [facility]   12/18/2019  3:30 PM Jasmin Crew PRVAZWD [facility]   12/20/2019  1:00 PM SILVERIO Esqueda CYXEYUP [facility]   12/20/2019  2:00 PM Jasmin Crew NEBLZFW [facility]   12/23/2019  9:00 AM Linda Joel PTA MMCPTPB [facility]   12/23/2019  9:30 AM Jasmin Crew PZHTQBT [facility]   12/24/2019  9:00 AM Linda Joel PTA MMCPTPB [facility]   12/24/2019  9:30 AM Andi Jese ECWTAON [facility]   12/24/2019 10:15 AM SILVERIO Esqueda KYUAPHJ [facility]   12/27/2019 10:30 AM Sen Contreras OTR/L MMCPTPB [facility]   12/27/2019 11:15 AM Darryle Officer, SLP MMCPTPB [facility]   12/30/2019  9:45 AM SILVERIO Garza KVMSHKJ [facility]   12/30/2019 10:30 AM Pricila Gottlieb, PTA MMCPTPB [facility]   12/30/2019 11:00 AM Claudetta Hickory, OTR/L MMCPTPB [facility]   12/31/2019  9:45 AM Claudetta Hickory, OTR/L MMCPTPB [facility]   12/31/2019 10:30 AM Pricila Gottlieb PTA MMCPTPB [facility]   12/31/2019 11:30 AM SILVERIO Parsons MMCPTPB [facility]   1/2/2020  9:45 AM Solitario Pierce BBRFLTO [facility]   1/6/2020  9:45 AM SILVERIO Garza TZPPDUA [facility]   1/6/2020 10:30 AM Pricila Gottlieb, PTA MMCPTPB [facility]   1/6/2020 11:00 AM Claudetta Hickory, OTR/L MMCPTPB [facility]   1/8/2020  9:45 AM SILVERIO Garza TKYCUBW [facility]   1/8/2020 10:30 AM Pricila Gottlieb PTA MMCPTPB [facility]   1/8/2020 11:00 AM Claudetta Hickory, OTR/L MMCPTPB [facility]   1/10/2020  9:45 AM Claudetta Hickory, OTR/L MMCPTPB [facility]   1/13/2020 10:15 AM Solitario Pierce SRKBAYX [facility]   1/13/2020 11:15 AM Anastasia Carrillo I, SLP [id number] [facility]   1/13/2020 12:00 PM Elena Baltazar, PT MMCPTPB [facility]   1/15/2020  1:00 PM Pricila Gottlieb, PTA MMCPTPB [facility]   1/15/2020  1:30 PM Anastasia Carrillo I, SLP MMCPTPB [facility]   1/15/2020  2:30 PM Claudetta Hickory, OTR/L MMCPTPB [facility]   1/17/2020  9:00 AM Solitario Lakes [id number] [facility]   1/20/2020  9:45 AM Cortez Louis, SLP [id number] [facility]   1/20/2020 10:30 AM Elena Baltazar, PT MMCPTPB [facility]   1/20/2020 11:00 AM Solitario Kari [id number] [facility]   1/22/2020 10:15 AM Claudetta Hickory, OTR/L MMCPTPB [facility]   1/22/2020 11:00 AM Pricila Gottlieb, PTA MMCPTPB [facility]   1/22/2020 11:30 AM Anastasia Carrillo I, SLP VDLQONU [facility]   1/24/2020  9:45 AM Claudetta Hickory, OTR/L MMCPTPB [facility]   1/27/2020  9:45 AM Anastasia Carrillo I, SLP LXLILXP [facility]   1/27/2020 10:30 AM Elena Baltazar, PT MMCPTPB [facility]   1/27/2020 11:00 AM Solitario Kari UVJOFRA [facility]   1/29/2020 10:45 AM Cortez Louis, SLP LEGFHLL [facility]   1/29/2020 11:30 AM Pricila Gottlieb PTA MMCPTPB [facility]   1/29/2020 12:30 PM Solitario Pierce IYETLCR [facility]   2/10/2020  8:30 AM Anuradha Perez MD [address]

## 2019-12-18 NOTE — PROGRESS NOTES
ST DAILY TREATMENT NOTE    Patient Name: Joseph Campo  Date:2019  : 1955  [x]  Patient  Verified  Payor: Payor: [place] MED[place]ARE / Plan: [place] MEDICARE PART A & B / Product Type: Medicare /   In time: 250  Out time: 330  Total Treatment Time (min): 40  Visit #: 20 of 36    Treatment Diagnosis: Dysarthria and anarthria [R47.1]    SUBJECTIVE  Pain Level (0-10 scale): 0  Any medication changes, allergies to medications, adverse drug reactions, diagnosis change, or new procedure performed?: [x] No    [] Yes (see summary sheet for update)    Subjective functional status/changes:   [x] No changes reported      OBJECTIVE  Treatment provided includes:  Increase/Improve:  []  Voice Quality []  Cognitive Linguistic Skills [x]  Laryngeal/Pharyngeal Exercises   []  Vocal Loudness []  Reading Comprehension [x]  Swallowing Skills    []  Vocal Cord Function []  Auditory Comprehension []  Oral Motor Skills   []  Resonance []  Writing Skills [x]  Compensatory strategies    []  Speech Intelligibility []  Expressive Language []  Attention   []  Breath Support/Coord.  []  Receptive language []  Memory   []  Articulation []  Safety Awareness []    []  Fluency []  Word Retrieval []        Treatment Provided:  - Oropharyngeal strengthening exercises with skilled instruction: high \"ee\" x20 min-modArickey x20 with Michael  - po trials of thin liquids ans solids utilizing compensatory techniques to increase safety   - /l/ and /th/ productions to increase speech intelligibility  - /z,s/ productions to increase speech intelligibility and articulatory precisions  - pt education      Patient/Caregiver  Education: [x] Review HEP      HEP/Handouts given: continue HEP established, work on increasing volume of voice to increase intelligibility     Pain Level (0-10 scale) post treatment: 0    ASSESSMENT   See progress report   []   Improving appropriately and progressing toward goals  [x]   Improving slowly and progressing toward goals  [] Approximating goals/maximum potential  [x]   Continues to benefit from skilled therapy to address remaining functional deficits  []   Not progressing toward goals and plan of care will be adjusted    Patient will continue to benefit from skilled therapy to address remaining functional deficits: dysphagia, dysarthria      Progress towards goals / Updated goals:  1. The pt will increase quality and length of phonation by sustaining ah utilizing effective diaphragmatic breath support for >10 seconds in 3/3 sessions to increase functional speech intelligibility. Met   2. Pt will demonstrate ability to produce improve prosody, rhyme, rhythm in structured sentences, word emphasis tasks, and varying pitch phrases using various comp strategies with 80% acc given min-mod cues in 3/3 opportunities to improve naturalness of speech.   Met   3. The patient will articulate (s/z) consonants at the word level- phrases level with 90% acc with use of comp strategies and OMES  to improve speech intelligibility to > 90% acc when provided with min cues. Progressing   4. The patient will articulate  (l, voiced /th/ consonants at the word level- phrases level  with 90% acc with use of comp strategies and OMES  to improve speech intelligibility to > 90% acc when provided with min cues. Met   5. Patient will complete tongue base retraction,and laryngeal/pharyngeal strengthening exercises (including Sheyla maneuver, Mendelsohn maneuver, modified Shaker with CTAR ball, hard /k/ in isolation,  effortful swallow, etc.), with min cues in 5/5 sessions in order to improve the strength/agility/efficiency of his swallow for improved swallowing safety and QOL.    Progressing   6.  Patient will recall/demonstrate aspiration precautions and safe swallowing strategies with 100% acc when provided with occasional cues in 5/5 sessions (small sips/bites; chin tuck with all liquid intake; alternate liquids/solids; slow rate; NO straws, Sit upright at 90 degree angle during PO trials)  to decrease the reported incidences of dysphagia and s/sx of aspiration.   Progressing   7. Patient will tolerate trials of thin liquids (>4 ounces) using compensatory strategies without s/sx of aspiration/penetration when provided with Michael across 5/5 sessions. Met  8. Patient will tolerate regular solids with utilization of compensatory strategies without s/sx of aspiration/penetration or distress with Michael across 5/5 sessions.   Met: x5/5 sessions without overt s/sx of aspiration - cannot rule out silent aspiration     PLAN  []  Continue plan of care  [x]  Modify Goals/Treatment Plan      []  Discharge due to:  [] Other:    SILVERIO Rivera 12/18/2019  2:58 PM    Future Appointments   Date Time Provider Marc Ruvalcaba   12/18/2019  3:30 PM Rae Buitrago, OTR/L MMCPTPB [address]   12/20/2019  1:00 PM SILVERIO Chopra LCYLSLS [address]   12/20/2019  2:00 PM Crystal Papa EMMGVDT [address]   12/23/2019  9:00 AM Onita Null, PTA MMCPTPB [address]   12/23/2019  9:30 AM Crystal Papa KXNXPDF [address]   12/24/2019  9:00 AM Onita Null, PTA PDAESWT [address]   12/24/2019  9:30 AM Arlen Watts UEPJOTZ [address]   12/24/2019 10:15 AM SILVERIO Chopra SQXUAZD [address]   12/27/2019 10:30 AM Rae Buitrago, OTR/L MMCPTPB [address]   12/27/2019 11:15 AM SILVERIO Stevens MMCPTPB [address]   12/30/2019  9:45 AM SILVERIO Chopra NBDLJAV [address]   12/30/2019 10:30 AM Onita Null, PTA MMCPTPB [address]   12/30/2019 11:00 AM Rae Buitrago, OTR/L MMCPTPB [address]   12/31/2019  9:45 AM Rae Buitrago, OTR/L MMCPTPB [address]   12/31/2019 10:30 AM Onaimee Null, PTA MMCPTPB [address]   12/31/2019 11:30 AM SILVERIO Germain VQUAKIS [address]   1/2/2020  9:45 AM Crystal Papnehemiah OMDFOJV [address]   1/6/2020  9:45 AM SILVERIO Chopra RCRBETB [address]   1/6/2020 10:30 AM Onaimee Null, PTA MMCPTPB [address]   1/6/2020 11:00 AM Rae Buitrago, OTR/L MMCPTPB [address]   1/8/2020  9:45 AM SILVERIO Chopra MMCPTPB [address]   1/8/2020 10:30 AM Matthew Doyle, PTA MMCPTPB [facility]   1/8/2020 11:00 AM Jet Andersen, OTR/L MMCPTPB [facility]   1/10/2020  9:45 AM Jet Andersen, OTR/L MMCPTPB [facility]   1/13/2020 10:15 AM Ankita MACKAY [facility]   1/13/2020 11:15 AM Willard Kuhn I, SILVERIO PXSFYME [facility]   1/13/2020 12:00 PM Zulema Mccarthy, PT MMCPTPB [facility]   1/15/2020  1:00 PM Matthew Doyle, PTA MMCPTPB [facility]   1/15/2020  1:30 PM Willard Kuhn I, SILVERIO LVNHQAL [facility]   1/15/2020  2:30 PM Jet Andersen, OTR/L MMCPTPB [facility]   1/17/2020  9:00 AM Ankita ROJOFFFJF [facility]   1/20/2020  9:45 AM Princess Orourke, SILVERIO AHWQWEN [facility]   1/20/2020 10:30 AM Zulema Mccarthy, PT MMCPTPB [facility]   1/20/2020 11:00 AM Ankita ROSENBAUMOHFLO [facility]   1/22/2020 10:15 AM Jet Andersen, OTR/L MMCPTPB [facility]   1/22/2020 11:00 AM Matthew Doyle, PTA MMCPTPB [facility]   1/22/2020 11:30 AM Willard Kuhn I, SLP XOLYGLV [facility]   1/24/2020  9:45 AM Jet Andersen, OTR/L MMCPTPB [facility]   1/27/2020  9:45 AM Willard Kuhn I, SLP ARIIACB [facility]   1/27/2020 10:30 AM Zulema Mccarthy, PT MMCPTPB [facility]   1/27/2020 11:00 AM Ankita Earl KNFOHMQ [facility]   1/29/2020 10:45 AM Princess Orourke, SLP YLTAIUA [facility]   1/29/2020 11:30 AM Matthew Doyle, PTA MMCPTPB [facility]   1/29/2020 12:30 PM Ankita Earl ZIWPKWY [facility]   2/10/2020  8:30 AM Razia Perez MD [address]

## 2019-12-20 ENCOUNTER — HOSPITAL ENCOUNTER (OUTPATIENT)
Dept: PHYSICAL THERAPY | Age: 64
Discharge: HOME | End: 2019-12-20
Payer: MEDICARE

## 2019-12-20 ENCOUNTER — APPOINTMENT (OUTPATIENT)
Dept: PHYSICAL THERAPY | Age: 64
End: 2019-12-20
Payer: MEDICARE

## 2019-12-20 PROCEDURE — 97110 THERAPEUTIC EXERCISES: CPT

## 2019-12-20 PROCEDURE — 92526 ORAL FUNCTION THERAPY: CPT

## 2019-12-20 PROCEDURE — 92507 TX SP LANG VOICE COMM INDIV: CPT

## 2019-12-20 PROCEDURE — 97032 APPL MODALITY 1+ESTIM EA 15: CPT

## 2019-12-20 PROCEDURE — 97112 NEUROMUSCULAR REEDUCATION: CPT

## 2019-12-20 NOTE — PROGRESS NOTES
ST DAILY TREATMENT NOTE    Patient Name: Preston Amend  Date:2019  : 1955  [x]  Patient  Verified  Payor: Payor: [place] MEDICARE / Plan: [place] MEDICARE PART A & B / Product Type: Medicare /   In time: 1:05  Out time:1:45  Total Treatment Time (min): 40  Visit #: 21 of 36    Treatment Diagnosis: Dysarthria and anarthria [R47.1] oropharyngeal dysphagia [R13.12]    SUBJECTIVE  Pain Level (0-10 scale): 0  Any medication changes, allergies to medications, adverse drug reactions, diagnosis change, or new procedure performed?: [x] No    [] Yes (see summary sheet for update)    Subjective functional status/changes:   [x] No changes reported  \"How long does it normally take to get your voice back? \"    OBJECTIVE  Treatment provided includes:  Increase/Improve:  []  Voice Quality []  Cognitive Linguistic Skills [x]  Laryngeal/Pharyngeal Exercises   []  Vocal Loudness []  Reading Comprehension [x]  Swallowing Skills    []  Vocal Cord Function []  Auditory Comprehension []  Oral Motor Skills   []  Resonance []  Writing Skills [x]  Compensatory strategies    [x]  Speech Intelligibility []  Expressive Language []  Attention   [x]  Breath Support/Coord.  []  Receptive language []  Memory   []  Articulation []  Safety Awareness []    []  Fluency []  Word Retrieval []        Treatment Provided:  - oropharyngeal strengthening exercises with skilled training to increase precision   - vowel prolongations for diaphragmatic breath support/coordination   - training use of appropriate vocal volume in functional phrases with skilled cueing   - /s/ and /th/ productions in functional phrases with cueing   - pt education     Patient/Caregiver  Education: [x] Review HEP      HEP/Handouts given: work on increasing vocal volume to improve speech intelligibility     Pain Level (0-10 scale) post treatment: 0    ASSESSMENT   Pt is progressing in vocal intensity and endurance for oropharyngeal exercises   []   Improving appropriately and progressing toward goals  [x]   Improving slowly and progressing toward goals  []   Approximating goals/maximum potential  [x]   Continues to benefit from skilled therapy to address remaining functional deficits  []   Not progressing toward goals and plan of care will be adjusted    Patient will continue to benefit from skilled therapy to address remaining functional deficits: dysphagia, dysarthria     Progress towards goals / Updated goals:  1. The pt will increase quality and length of phonation by sustaining ah utilizing effective diaphragmatic breath support for >15 seconds when provided with min-modA in 3/3 sessions to increase functional speech intelligibility. 19 progressing: average 11 seconds across 5 trials     2. The patient will articulate (s/z) consonants at the word level- phrases level with 90% acc with use of comp strategies and OMES  to improve speech intelligibility to > 90% acc when provided with min cues. 19 progressing: informally measured during production of functional phrases ~80% accuracy with min-modA    3. The patient will articulate  (l, voiced /th/ consonants at the sentence to conversational level  with 90% acc with use of comp strategies and OMES  to improve speech intelligibility to > 90% acc when provided with min cues. 19 progressing: informally measured during production of functional phrases and sentences with 75% accuracy for /th/ productions     4.  Pt will demonstrate adequate vocal intensity in varying levels from whisper to yelling with 85% accuracy in structured tasks at the sentence to conversational Yoav Talib provided with min cues.    19 progressin% accuracy with modA for functional phrases.      5. Patient will complete tongue base retraction,and laryngeal/pharyngeal strengthening exercises (including Sheyla maneuver, Mendelsohn maneuver, modified Shaker with CTAR ball, hard /k/ in isolation,  effortful swallow, etc.), with min cues in 5/5 sessions in order to improve the strength/agility/efficiency of his swallow for improved swallowing safety and QOL. 12/20/19 progressing: modified shaker with CTAR ball extended hold x7, 5 sec hold x30 with Michael; supra-glottic swallow x15 with min-modA     6. Patient will recall/demonstrate aspiration precautions and safe swallowing strategies with 100% acc when provided with occasional cues in 5/5 sessions (small sips/bites; chin tuck with all liquid intake; alternate liquids/solids; slow rate; NO straws, Sit upright at 90 degree angle during PO trials)  to decrease the reported incidences of dysphagia and s/sx of aspiration.   12/20/19 progressing: with Michael during po trials.      PLAN  [x]  Continue plan of care  []  Modify Goals/Treatment Plan      []  Discharge due to:  [] Other:    SILVERIO Childs 12/20/2019  1:16 PM    Future Appointments   Date Time Provider Marc Ruvalcaba   12/20/2019  2:00 PM Rosalina Alvarez LUCUQHT [facility]   12/23/2019  9:00 AM Karmen Stuart PTA MMCPTPB [facility]   12/23/2019  9:30 AM Rosalina lAvarez SDZXMWV [facility]   12/24/2019  9:00 AM Gloria Gonzales PTA MMCPTPB [facility]   12/24/2019  9:30 AM Randall Montesinos OZXKTFG [facility]   12/24/2019 10:15 AM SILVERIO Johnson JPUMSNF [facility]   12/27/2019 10:30 AM Jose Alberto Patel OTR/L MMCPTPB [facility]   12/27/2019 11:15 AM SILVERIO Allen MMCPTIMTIAZ [facility]   12/30/2019  9:45 AM SILVERIO Johnson VWODRME [facility]   12/30/2019 10:30 AM Gloria Gonzales PTA MMCPTPB [facility]   12/30/2019 11:00 AM Jose Alberto Patel, OTR/L MMCPTPB [facility]   12/31/2019  9:45 AM Jose Alberto Patel, OTR/L MMCPTPB [facility]   12/31/2019 10:30 AM Gloria Gonzales, PTA MMCPTPB [facility]   12/31/2019 11:30 AM SILVERIO Hart LVUAMFB [facility]   1/2/2020  9:45 AM Rosalina Alvarez RDQMOXC [facility]   1/6/2020  9:45 AM SILVERIO Johnson PMLGDNM [facility]   1/6/2020 10:30 AM Gloria Gonzales, PTA MMCPTPB [facility]   1/6/2020 11:00 AM Jose Alberto Patel, OTR/L MMCPTPB [facility]   1/8/2020  9:45 AM SILVERIO Johnson LCWJDEL [facility]   1/8/2020 10:30 AM Pricila Gottlieb, PTA MMCPTPB [facility]   1/8/2020 11:00 AM Claudetta Hickory, OTR/L MMCPTPB [facility]   1/10/2020  9:45 AM Claudetta Hickory, OTR/L MMCPTPB [facility]   1/13/2020 10:15 AM Solitario Pierce KSRJDTO [facility]   1/13/2020 11:15 AM SILEVRIO Garza KXMRPZJ [facility]   1/13/2020 12:00 PM Elena Baltazar, PT MMCPTPB [facility]   1/15/2020  1:00 PM Pricila Gottlieb, PTA MMCPTPB [facility]   1/15/2020  1:30 PM SILVERIO Parsons DSQHKHD [facility]   1/15/2020  2:30 PM Claudetta Hickory, OTR/L MMCPTPB [facility]   1/17/2020  9:00 AM Solitario Pierce YQTNBHZ [facility]   1/20/2020  9:45 AM SILVERIO Garza JBZHCCG [facility]   1/20/2020 10:30 AM Elena Baltazar, PT MMCPTPB [facility]   1/20/2020 11:00 AM Solitario Pierce JUHTVMF [facility]   1/22/2020 10:15 AM Claudetta Hickory, OTR/L MMCPTPB [facility]   1/22/2020 11:00 AM Pricila Gottlieb, PTA MMCPTPB [facility]   1/22/2020 11:30 AM Anastasia Carrillo I, SLP NWNVUUR [facility]   1/24/2020  9:45 AM Claudetta Hickory, OTR/L MMCPTPB [facility]   1/27/2020  9:45 AM Anastasia Carrillo I, SLP TCHFYRF [facility]   1/27/2020 10:30 AM Elena Baltazar, PT MMCPTPB [facility]   1/27/2020 11:00 AM Solitario Kari GJGEPYQ [facility]   1/29/2020 10:45 AM Cortez Louis, SILVERIO LTPBMBR [facility]   1/29/2020 11:30 AM Pricila Gottlieb, PTA MMCPTPB [facility]   1/29/2020 12:30 PM Solitario Pierce ZKOFFRU [facility]   2/10/2020  8:30 AM Néstor Worley MD [address]   2/14/2020 10:15 AM Keshav Barajas MD [facility] Pour

## 2019-12-20 NOTE — PROGRESS NOTES
OT DAILY TREATMENT NOTE 10-18    Patient Name: Juve Mcdowell  Date:2019  : 1955  [x]  Patient  Verified  Payor: [place] MEDICARE / Plan: [place] MEDICARE PART A & B / Product Type: Medicare /    In time:200  Out time:243  Total Treatment Time (min): 43  Visit #: 4 of 12    Medicare/BCBS Only   Total Timed Codes (min):  43 1:1 Treatment Time:  43     Treatment Area: Upper extremity weakness [R29.898]  Cerebral infarction, unspecified [I63.9]    SUBJECTIVE  Pain Level (0-10 scale): 0/10  Any medication changes, allergies to medications, adverse drug reactions, diagnosis change, or new procedure performed?: [x] No    [] Yes (see summary sheet for update)  Subjective functional status/changes:   [] No changes reported  I think yours works better than mine. OBJECTIVE  Modality rationale: increase muscle contraction/control to improve the patients ability to extend wrist   Min Type Additional Details      []? Estim:  []? Unatt       []? IFC  []? Premod                        []?Other:  []?w/ice   []?w/heat  Position:  Location:       15 []? Estim: []? Att    []? TENS instruct  [x]? NMES                    []?Other:  []?w/US   []?w/ice   []?w/heat  Position:wrist ext with home unit  Location:      []? Traction: []? Cervical       []? Lumbar                       []? Prone          []? Supine                       []?Intermittent   []? Continuous Lbs:  []? before manual  []? after manual      []? Ultrasound: []? Continuous   []? Pulsed                           []? 1MHz   []? 3MHz W/cm2:  Location:      []? Iontophoresis with dexamethasone         Location: []? Take home patch   []? In clinic      []? Ice     []?  heat  []? Ice massage  []? Laser   []? Anodyne Position:  Location:      []? Laser with stim  []? Other:  Position:  Location:      []? Vasopneumatic Device Pressure:       []? lo []? med []? hi   Temperature: []? lo []? med []? hi       [x]? Skin assessment post-treatment:  [x]? intact []? redness- no adverse reaction  []? redness - adverse reaction:     10 min Therapeutic Exercise:  [] See flow sheet :   Rationale: increase ROM and increase strength to improve the patients ability to reach, use Right UE    Floor REach  Shrugs/Retraction  PROM Shoulder Abduction      18 min Neuromuscular Re-education:  []  See flow sheet :   Rationale: increase ROM, increase strength and improve coordination  to improve the patients ability to functionally use Right UE    Supine place and hold   Knee to knee with mod difficulty   Recip shoulder flexion with elbow extension with resistance       With   [] TE   [] TA   [] neuro   [] other: Patient Education: [x] Review HEP    [] Progressed/Changed HEP based on:   [] positioning   [] body mechanics   [] transfers   [] heat/ice application   [] Splint wear/care   [] Sensory re-education   [] scar management      [] other:            Other Objective/Functional Measures:     Improved ability to keep Shoulder flexed in supine for increased time      Pain Level (0-10 scale) post treatment: 0/10    ASSESSMENT/Changes in Function: shoulder control improving     Patient will continue to benefit from skilled OT services to modify and progress therapeutic interventions, address ROM deficits, address strength deficits, analyze and address soft tissue restrictions, analyze and cue movement patterns and instruct in home and community integration to attain remaining goals. []  See Plan of Care  []  See progress note/recertification  []  See Discharge Summary         Progress towards goals / Updated goals:  1.  Patient will be able to actively grasp and release light items with right hand to assist with self care tasks.     2.  Patient will be able to actively flex shoulder to 45 degrees to improve ability to place arm through sleeve.   Status at last visit; 30 degrees 12/13/19 progressing 12/16/18, able to keep in flexion in supine for more than one minute 12/18/19     3.  Patient will be able to actively extend wrist to posiiton hand for grasp of items.       4.  Patient will report improved ability to use right hand to assist with home care as shown by reduced limitation of at least 20% on Neuro quality of Life    PLAN  []  Upgrade activities as tolerated     [x]  Continue plan of care  []  Update interventions per flow sheet       []  Discharge due to:_  []  Other:_      Francesca Hernandez ,ANDERSON/L 12/20/2019  10:59 AM    Future Appointments   Date Time Provider Marc Ruvalcaba   12/23/2019  9:00 AM Dayana Biswas, PTA [facility] [address]   12/23/2019  9:30 AM Efraín Thomas [id number] [address]   12/24/2019  9:00 AM Kia Lout, PTA [facility] [address]   12/24/2019  9:30 AM Jacky De Jesus GNUHQVK [address]   12/24/2019 10:15 AM SILVERIO Palmer [facility] [address]   12/27/2019 10:30 AM Ammy Nanette, OTR/L [facility] [address]   12/27/2019 11:15 AM SILVERIO Vega [facility] [address]   12/30/2019  9:45 AM SILVERIO Santillan [facility] [address]   12/30/2019 10:30 AM Kia Lout, PTA [facility] [address]   12/30/2019 11:00 AM Ammy Fitzpatrick, OTR/L [facility] [address]   12/31/2019  9:45 AM Ammy Nanette, OTR/L [facility] [address]   12/31/2019 10:30 AM Kia Lout, PTA [facility] [address]   12/31/2019 11:30 AM SILVERIO Palmer [facility] [address]   1/2/2020  9:45 AM Efraín Thomas TDBLMAU [address]   1/6/2020  9:45 AM SILVERIO Santillan [facility] [address]   1/6/2020 10:30 AM Kia Lout, PTA [facility] [address]   1/6/2020 11:00 AM Ammy Nanette, OTR/L [facility] [address]   1/8/2020  9:45 AM Miles Sue, SILVERIO [facility] [address]   1/8/2020 10:30 AM Kia Aguillon, PTA [facility] [address]   1/8/2020 11:00 AM Ammy Nanette, OTR/L [facility] [address]   1/10/2020  9:45 AM Ammy Nanette, OTR/L [facility] [address]   1/13/2020 10:15 AM Efraín William CXKGSDK [address]   1/13/2020 11:15 AM Esme Taveras I, SILVERIO [facility] [address]   1/13/2020 12:00 PM Diana Verdin, PT [facility] [address]   1/15/2020  1:00 PM Kia Aguillon, LINDA [facility] [address]   1/15/2020  1:30 PM Esme Taveras I, SLP [facility] [address]   1/15/2020  2:30 PM Arlette Eric, OTR/L [facility]   1/17/2020  9:00 AM Kathleen Esquivel FHBSLLA [facility]   1/20/2020  9:45 AM SILVERIO Rossi [id number] [facility]   1/20/2020 10:30 AM Norberto Gonzalez, PT [facility]   1/20/2020 11:00 AM Kathleen Esquivel [id number] [facility]   1/22/2020 10:15 AM Arlette Eric, OTR/L [facility]   1/22/2020 11:00 AM Dee Socks, PTA [facility]   1/22/2020 11:30 AM SILVERIO Sandoval [id number] [facility]   1/24/2020  9:45 AM Arlette Eric, OTR/L [facility]   1/27/2020  9:45 AM SILVERIO Sandoval LARUUSF [facility]   1/27/2020 10:30 AM Nobrerto Gonzalez, PT [facility]   1/27/2020 11:00 AM Kathleen Esquivel WHXHHKM [facility]   1/29/2020 10:45 AM SILVERIO Rossi OHORFVO [facility]   1/29/2020 11:30 AM Dee Socks, PTA [facility]   1/29/2020 12:30 PM Kathleen Esquivel OWYSUGL [facility]   2/10/2020  8:30 AM Olga Nevarez MD [facility] [address]   2/14/2020 10:15 AM Anshu Savage MD [facility] Tahira Montero

## 2019-12-23 ENCOUNTER — HOSPITAL ENCOUNTER (OUTPATIENT)
Dept: PHYSICAL THERAPY | Age: 64
Discharge: HOME | End: 2019-12-23
Payer: MEDICARE

## 2019-12-23 ENCOUNTER — APPOINTMENT (OUTPATIENT)
Dept: PHYSICAL THERAPY | Age: 64
End: 2019-12-23
Payer: MEDICARE

## 2019-12-23 PROCEDURE — 97110 THERAPEUTIC EXERCISES: CPT

## 2019-12-23 PROCEDURE — 97112 NEUROMUSCULAR REEDUCATION: CPT

## 2019-12-23 NOTE — PROGRESS NOTES
PT DAILY TREATMENT NOTE 10-18    Patient Name: Sondra Campbell  Date:2019  : 1955  [x]  Patient  Verified  Payor: VA MEDICARE / Plan: VA MEDICARE PART A & B / Product Type: Medicare /    In time:8:57  Out time:9:30  Total Treatment Time (min): 33  Visit #: 8 of 12    Medicare/BCBS Only   Total Timed Codes (min):  33 1:1 Treatment Time:  33       Treatment Area: Lower extremity weakness [R29.898]  Hemiplegia, unspecified affecting right dominant side [G81.91]    SUBJECTIVE  Pain Level (0-10 scale): 0/10  Any medication changes, allergies to medications, adverse drug reactions, diagnosis change, or new procedure performed?: [x] No    [] Yes (see summary sheet for update)  Subjective functional status/changes:   [] No changes reported  Pt reports no pain and ready to work. He notes needing another piece of velcro for his AFO. He feels his workouts have been the same and he needs more help with his walking.      OBJECTIVE    33 min Neuromuscular Re-education:  [] See flow sheet : see below for detail   Rationale: increase ROM and increase strength to improve the patients ability to increase ease with walking and performing ADLs    With   [x] TE   [] TA   [] neuro   [] other: Patient Education: [x] Review HEP    [] Progressed/Changed HEP based on:   [] positioning   [] body mechanics   [] transfers   [] heat/ice application    [] other:      Other Objective/Functional Measures:   Session focused initially using powder board in a gravity minimized position to assess right hamstring strength; deemed 2/5 unable to take resistance gravity minimized   PNF D1 flexion/extension used to help facilitate neuromuscular patterns for better flexion and DF activation  During gait pt utilizes pelvic elevation for swing phase which puts pt into an extensor synergy making knee flexion difficult  Educated on adding calf stretching to improve ROM for DF  Worked on facilitate swing phase in supine with right leg off table in hip extension maintaining knee flexion to move from extension to flexion    Pain Level (0-10 scale) post treatment: 0/10    ASSESSMENT/Changes in Function:   Pt continues to ambulate with poor neuromuscular pattern due to pelvic elevation and decreased HS strength. He has 2/5 HS strength gravity minimized but utilizes pelvic compensation during gait. He also locks knee in extension during midstance on the right due to decreased TKE strength. Will work on neuromuscular re-education for improve pattern with ambulation to utilize LRAD and decrease fall risk. Progress towards goals / Updated goals:  1. Pt will ambulate 4 steps x 3 with 1 HR with supervision to be able to navigate stairs at home.                2. Pt will increase FOTO score by 7 pts to improve LE function.                3. Pt will increase right LE hip, quad and HS strength to 4/5 or greater to be able to navigate community distances.   Slowly progressing. 12/2/19                4. Pt will demonstrate Romberg EO/EC on a compliant surface to decrease fall risk during ADL's.   Progressing. Pt is able to maintain balance on foam with EO, but cont to have moderate difficulty with EC. 12/18/19               5. Pt will ambulate with LRAD >150 ft including an obstacle course to be able to navigate short community distances safely.    Met/Progressing. Pt wants to work on increasing endurance.  12/9/19    PLAN  [x]  Upgrade activities as tolerated     [x]  Continue plan of care  []  Update interventions per flow sheet       []  Discharge due to:_  []  Other:_      Dang Fernandez PTA 12/23/2019  2:02 PM    Future Appointments   Date Time Provider Marc Ruvalcaba   12/24/2019  9:00 AM [facility] [address] [facility]   12/24/2019  9:30 AM Devi Lute NVYHXLA [address] [facility]   12/24/2019 10:15 AM Diana Zarco, SLP HMEXGSX [address] [facility]   12/27/2019 10:30 AM Rafaela Bee, OTR/L VBVGNKS [address] [facility]   12/27/2019 11:15 AM Carmela Mills, SLP [facility] [address] [facility]   12/30/2019 9:45 AM Cortez Louis, SLP [id number] [facility]   12/30/2019 10:30 AM Xi Hooks, PT [id number] [facility]   12/30/2019 11:00 AM Claudetta Hickory, OTR/L [facility]   12/31/2019  9:45 AM Claudetta Hickory, OTR/L [facility]   12/31/2019 10:30 AM Peyton Chamber, PTA [facility]   12/31/2019 11:30 AM Anastasia Carrillo I, SLP [facility]   1/2/2020  9:45 AM Solitario Pierce [id number] [facility]   1/6/2020  9:45 AM Cortez Louis, SLP [id number] [facility]   1/6/2020 10:30 AM Peyton Chamber, PTA [facility]   1/6/2020 11:00 AM Claudetta Hickory, OTR/L [facility]   1/8/2020  9:45 AM Cortez Louis, SLP [id number] [facility]   1/8/2020 10:30 AM Elena Baltazar, PT [facility]   1/8/2020 11:00 AM Claudetta Hickory, OTR/L [facility]   1/10/2020  9:45 AM Claudetta Hickory, OTR/L [facility]   1/13/2020 10:15 AM Solitario Pierce [id number] [facility]   1/13/2020 11:15 AM Anastasia Carrillo I, SLP [id number] [facility]   1/13/2020 12:00 PM Elena Baltazar, PT [facility]   1/15/2020  1:00 PM Peyton Chamber, PTA [facility]   1/15/2020  1:30 PM Anastasia Carrillo I, SLP [id number] [facility]   1/15/2020  2:30 PM Claudetta Hickory, OTR/L [facility]   1/17/2020  9:00 AM Solitario Pierce CXOVPQC [facility]   1/20/2020  9:45 AM Cortez Louis, [facility]LP [facility]   1/20/2020 10:30 AM Elena Baltazar, PT [facility]   1/20/2020 11:00 AM Solitario Kari [id number] [facility]   1/22/2020 10:15 AM Claudetta Hickory, OTR/L [facility]   1/22/2020 11:00 AM Elena Baltazar, PT [facility]CP[facility]   1/22/2020 11:30 AM Anastasia Carrillo I, SLP [facility]   1/24/2020  9:45 AM Claudetta Hickory, OTR/L [facility]   1/27/2020  9:45 AM Anastasia Carrillo I, SLP MMCPTPB [facility]   1/27/2020 10:30 AM Elena Baltazar, PT [facility]   1/27/2020 11:00 AM Solitario Kari EONKQNO [facility]   1/29/2020 10:45 AM Cortez Louis, SLP [id number] [facility]   1/29/2020 11:30 AM Peyton Chamber, PTA [facility]   1/29/2020 12:30 PM Solitario Lakes [id number] [facility]   2/10/2020  8:30 AM Néstor Worley  [address]   2/14/2020 10:15 AM Lita Leo MD [facility] Severiano Pour

## 2019-12-23 NOTE — PROGRESS NOTES
OT DAILY TREATMENT NOTE 10-18    Patient Name: Pati Anand  Date:2019  : 1955  [x]  Patient  Verified  Payor: VA MEDICARE / Plan: VA MEDICARE PART A & B / Product Type: Medicare /    In time:937  Out time:1015  Total Treatment Time (min): 38  Visit #: 5 of 12    Medicare/BCBS Only   Total Timed Codes (min):  38 1:1 Treatment Time:  38     Treatment Area: Upper extremity weakness [R29.898]  Cerebral infarction, unspecified [I63.9]    SUBJECTIVE  Pain Level (0-10 scale): 0/10  Any medication changes, allergies to medications, adverse drug reactions, diagnosis change, or new procedure performed?: [x] No    [] Yes (see summary sheet for update)  Subjective functional status/changes:   [] No changes reported  I wish my wife would help me more at home.      OBJECTIVE  13 min Therapeutic Exercise:  [] See flow sheet :   Rationale: increase ROM and increase strength to improve the patients ability to reach, use Right UE    Floor Reach  Shrugs/Retractin  PROM Shoulder Abduction    25 min Neuromuscular Re-education:  []  See flow sheet :   Rationale: increase ROM, increase strength and improve coordination  to improve the patients ability to functionally use Right UE    Knee to knee with mod difficulty   Saebo Tree: Lv 1 2x4, Lv 2 4x4    With   [] TE   [] TA   [] neuro   [] other: Patient Education: [x] Review HEP    [] Progressed/Changed HEP based on:   [] positioning   [] body mechanics   [] transfers   [] heat/ice application   [] Splint wear/care   [] Sensory re-education   [] scar management      [] other:            Other Objective/Functional Measures:     Rest breaks required during knee to knee as task progressed      Pain Level (0-10 scale) post treatment: 0/10    ASSESSMENT/Changes in Function: easily fatigued on this date     Patient will continue to benefit from skilled OT services to modify and progress therapeutic interventions, address ROM deficits, address strength deficits and instruct in home and community integration to attain remaining goals. []  See Plan of Care  []  See progress note/recertification  []  See Discharge Summary         Progress towards goals / Updated goals:  1.  Patient will be able to actively grasp and release light items with right hand to assist with self care tasks.     2.  Patient will be able to actively flex shoulder to 45 degrees to improve ability to place arm through sleeve.   Status at last visit; 30 degrees 12/13/19 progressing 12/16/18, able to keep in flexion in supine for more than one minute 12/18/19     3.  Patient will be able to actively extend wrist to posiiton hand for grasp of items.       4.  Patient will report improved ability to use right hand to assist with home care as shown by reduced limitation of at least 20% on Neuro quality of Life       PLAN  []  Upgrade activities as tolerated     [x]  Continue plan of care  []  Update interventions per flow sheet       []  Discharge due to:_  []  Other:_      DARELL King 12/23/2019  8:17 AM    Future Appointments   Date Time Provider Marc Ruvalcaba   12/23/2019  9:00 AM Doreen Connell PTA [facility]   12/23/2019  9:30 AM Isidro Ceja LSSWKAW [facility]   12/24/2019  9:00 AM Margaret Newby PTA [facility]   12/24/2019  9:30 AM Tay Carrillo [id number] [facility]   12/24/2019 10:15 AM SILVERIO Clay MMCPTPB [facility]   12/27/2019 10:30 AM MALLIKA Abdi/L [facility]   12/27/2019 11:15 AM SILVERIO Skinner MMCPTPB [facility]   12/30/2019  9:45 AM SILVERIO Gutierres MMCPTPB [facility]   12/30/2019 10:30 AM Margaret Newby PTA [facility]   12/30/2019 11:00 AM Mena Brown, OTR/L [facility]   12/31/2019  9:45 AM Mena Brown, OTR/L [facility]   12/31/2019 10:30 AM Margaret Newby PTA [facility]   12/31/2019 11:30 AM SILVERIO Gutierres MMCPTPB [facility]   1/2/2020  9:45 AM Isidro Ceja AAMYPLH [facility]   1/6/2020  9:45 AM SILVERIO Gutierres MMCPTPB [facility]   1/6/2020 10:30 AM Khari Reyez, PTA [facility]   1/6/2020 11:00 AM Levonia Ohm, OTR/L [facility]   1/8/2020  9:45 AM Odilon Gann I, SLP [id number] [facility]   1/8/2020 10:30 AM Khari Reyez, PTA [facility]   1/8/2020 11:00 AM Levonia Ohm, OTR/L [facility]   1/10/2020  9:45 AM Levonia Ohm, OTR/L [facility]CP[facility]B [facility]   1/13/2020 10:15 AM Severiano Reddish KYLOSFH [facility]   1/13/2020 11:15 AM Odilon Gann I, SLP [id number] [facility]   1/13/2020 12:00 PM Gerspencer Kendrickrid, PT [facility]CP[facility]   1/15/2020  1:00 PM Khari Reyez, PTA [facility]   1/15/2020  1:30 PM Odilon Gann I, SLP [id number] [facility]   1/15/2020  2:30 PM Levonia Ohm, OTR/L [facility]   1/17/2020  9:00 AM Severiano Reddish [id number] [facility]   1/20/2020  9:45 AM José Miguel Reese, SILVERIO [id number] [facility]   1/20/2020 10:30 AM Gerardine Gastelum, PT [facility]CP[facility]   1/20/2020 11:00 AM Severiano Redflorencioh [id number] [facility]   1/22/2020 10:15 AM Levonia Ohm, OTR/L [facility]CP[facility]   1/22/2020 11:00 AM Khari Reyez, PTA [facility]   1/22/2020 11:30 AM Odilon Gann I, SLP [id number] [facility]   1/24/2020  9:45 AM Levonia Ohm, OTR/L [facility]CP[facility]   1/27/2020  9:45 AM Odilon Crofter I, SLP [id number] [facility]   1/27/2020 10:30 AM Myron Gastelum, PT [facility]CPTP[facility]   1/27/2020 11:00 AM Severiano Reddish [id number] [facility]   1/29/2020 10:45 AM José Miguel Reese, SILVERIO [id number] [facility]   1/29/2020 11:30 AM Khari Reyez, PTA MMCPTPB [facility]   1/29/2020 12:30 PM Severiano Redflorenciogregorio [id number] [facility]   2/10/2020  8:30 AM Garcia Calle MD [address]   2/14/2020 10:15 AM [facility]

## 2019-12-24 ENCOUNTER — HOSPITAL ENCOUNTER (OUTPATIENT)
Dept: PHYSICAL THERAPY | Age: 64
Discharge: HOME | End: 2019-12-24
Payer: MEDICARE

## 2019-12-24 ENCOUNTER — DOCUMENTATION ONLY (OUTPATIENT)
Dept: NEUROLOGY | Age: 64
End: 2019-12-24

## 2019-12-24 ENCOUNTER — TELEPHONE (OUTPATIENT)
Dept: NEUROLOGY | Age: 64
End: 2019-12-24

## 2019-12-24 PROCEDURE — 97112 NEUROMUSCULAR REEDUCATION: CPT

## 2019-12-24 PROCEDURE — 92507 TX SP LANG VOICE COMM INDIV: CPT

## 2019-12-24 PROCEDURE — 92526 ORAL FUNCTION THERAPY: CPT

## 2019-12-24 PROCEDURE — 97530 THERAPEUTIC ACTIVITIES: CPT

## 2019-12-24 NOTE — PROGRESS NOTES
ST DAILY TREATMENT NOTE    Patient Name: Nivia Lot  Date:2019  : 1955  [x]  Patient  Verified  Payor: Payor: [place] MEDICARE / Plan: [place] MEDICARE PART A & B / Product Type: Medicare /   In time: 10:22  Out time: 11:00  Total Treatment Time (min): 38  Visit #: 22 of 36    Treatment Diagnosis: Dysarthria and anarthria [R47.1]  oropharyngeal dysphagia [R13.12]    SUBJECTIVE  Pain Level (0-10 scale):0  Any medication changes, allergies to medications, adverse drug reactions, diagnosis change, or new procedure performed?: [x] No    [] Yes (see summary sheet for update)    Subjective functional status/changes:   [x] No changes reported      OBJECTIVE  Treatment provided includes:  Increase/Improve:  []  Voice Quality []  Cognitive Linguistic Skills [x]  Laryngeal/Pharyngeal Exercises   []  Vocal Loudness []  Reading Comprehension [x]  Swallowing Skills    []  Vocal Cord Function []  Auditory Comprehension []  Oral Motor Skills   []  Resonance []  Writing Skills [x]  Compensatory strategies    []  Speech Intelligibility []  Expressive Language []  Attention   []  Breath Support/Coord.  []  Receptive language []  Memory   []  Articulation []  Safety Awareness []    []  Fluency []  Word Retrieval []        Treatment Provided:  - oropharyngeal strengthening exercises with skilled cueing to increase precision   - po trials using compensatory strategies  - phrases and sentences with increased vocal volume to increase intelligibility   - /s/ productions in sentences for increase articulatory precision   - pt education     Patient/Caregiver  Education: [x] Review HEP      HEP/Handouts given: continue HEP established     Pain Level (0-10 scale) post treatment: 0    ASSESSMENT   Ongoing progression in independence when completing oropharyngeal strengthening exercises   []   Improving appropriately and progressing toward goals  [x]   Improving slowly and progressing toward goals  []   Approximating goals/maximum potential  [x]   Continues to benefit from skilled therapy to address remaining functional deficits  []   Not progressing toward goals and plan of care will be adjusted    Patient will continue to benefit from skilled therapy to address remaining functional deficits: dysphagia , dysarthria     Progress towards goals / Updated goals:  1. The pt will increase quality and length of phonation by sustaining ah utilizing effective diaphragmatic breath support for >15 seconds when provided with min-modA in 3/3 sessions to increase functional speech intelligibility. 19 average 10 seconds across 6 trials modA for easy onsets/shaping/breath support      2. The patient will articulate (s/z) consonants at the word level- phrases level with 90% acc with use of comp strategies and OMES  to improve speech intelligibility to > 90% acc when provided with min cues. 19: medial /s/ in sentences with 85% accuracy with Michael, initial 95% with Michael     3. The patient will articulate  (l, voiced /th/ consonants at the sentence to conversational level  with 90% acc with use of comp strategies and OMES  to improve speech intelligibility to > 90% acc when provided with min cues. 19 not addressed this date      4.  Pt will demonstrate adequate vocal intensity in varying levels from whisper to yelling with 85% accuracy in structured tasks at the sentence to conversational Dayana Perry provided with min cues.   19 progressin% accuracy with modA for phrases to sentences      5. Patient will complete tongue base retraction,and laryngeal/pharyngeal strengthening exercises (including Sheyla maneuver, Mendelsohn maneuver, modified Shaker with CTAR ball, hard /k/ in isolation,  effortful swallow, etc.), with min cues in 5/5 sessions in order to improve the strength/agility/efficiency of his swallow for improved swallowing safety and QOL.   19 progressing: sheyla x25 with Michael, extended yawn x10 with Michael, modified shaker with CTAR ball x25 with Michael, tongue pull backs forTBR x20      6. Patient will recall/demonstrate aspiration precautions and safe swallowing strategies with 100% acc when provided with occasional cues in 5/5 sessions (small sips/bites; chin tuck with all liquid intake; alternate liquids/solids; slow rate; NO straws, Sit upright at 90 degree angle during PO trials)  to decrease the reported incidences of dysphagia and s/sx of aspiration.   12/24/19 progressing: with Michael during po trials.  Wet vocal quality x1 without proper use of chin tuck - follows cues to clear throat complete volitional swallow     PLAN  [x]  Continue plan of care  []  Modify Goals/Treatment Plan      []  Discharge due to:  [] Other:    SILVERIO Case 12/24/2019  9:52 AM    Future Appointments   Date Time Provider Marc Ruvalcaba   12/24/2019 10:15 AM SILVERIO Stout MMCPTPB [facility]   12/27/2019 10:30 AM Giselle Ramos, OTR/L [facility]   12/27/2019 11:15 AM Flor Staton, SILVERIO MMCPTPB [facility]   12/30/2019  9:45 AM Aretha Barton, SILVERIO HMUWNQT [facility]   12/30/2019 10:30 AM Ish Max, PT [facility]CP[facility]   12/30/2019 11:00 AM Giselle Ramos, OTR/L [facility]   12/31/2019  9:45 AM Giselle Ramos, OTR/L [facility]   12/31/2019 10:30 AM Christopher Peters, PTA [facility]   12/31/2019 11:30 AM Ruben Alston I, SLP [id number] [facility]   1/2/2020  9:45 AM Lainey Rojo CJIJVRV [facility]   1/6/2020  9:45 AM Aretha Barton, SILVERIO MMCPTPB [facility]   1/6/2020 10:30 AM Chirstopher Peters, LINDA MMCPTPB [facility]   1/6/2020 11:00 AM Giselle Ramos, OTR/L [facility]CP[facility]   1/8/2020  9:45 AM Aretha Barton, SLP MFFQPQA [facility]   1/8/2020 10:30 AM Wellington Hurtado, PT [facility]CP[facility]   1/8/2020 11:00 AM Giselle Ramos, OTR/L [facility]CP[facility]   1/10/2020  9:45 AM Giselle Ramos, OTR/L [facility]CP[facility]   1/13/2020 10:15 AM Lainey PENNINGTON [facility]   1/13/2020 11:15 AM SILVERIO Edwards CDRCAIZ [facility]   1/13/2020 12:00 PM Wellington Hurtado, PT [facility]   1/15/2020  1:00 PM Christopher Peters, PTA [facility]   1/15/2020  1:30 PM Deloras Neighbours I, SLP [facility]CP[facility]   1/15/2020  2:30 PM Cherl Jose, OTR/L [facility]   1/17/2020  9:00 AM Benedicto Jose YWIXFOV [facility]   1/20/2020  9:45 AM Denita Mackay, SLP WIAOLCT [facility]   1/20/2020 10:30 AM Katherine Ledesma, PT [facility]CP[facility]   1/20/2020 11:00 AM Benedicto Jose [id number] [facility]   1/22/2020 10:15 AM Cherl Jose, OTR/L [facility]CP[facility]   1/22/2020 11:00 AM Katherine Ledesma, PT [facility]   1/22/2020 11:30 AM Terelloras Neighbours I, SLP [facility]CPTPB [facility]   1/24/2020  9:45 AM Russell Garcia, OTR/L [facility]   1/27/2020  9:45 AM Terelloras Talis I, SLP UIIJRBY [facility]   1/27/2020 10:30 AM Katherine Ledesma, PT [facility]CP[facility]   1/27/2020 11:00 AM Benedicto Jose WKSQWQO [facility]   1/29/2020 10:45 AM Denita Mackay, SLP YZZWIAN [facility]   1/29/2020 11:30 AM Prema Brennan, PTA [facility]CP[facility]   1/29/2020 12:30 PM Benedicto Jose [id number] [facility]   2/10/2020  8:30 AM Zeina Clifton MD [address]   2/14/2020 10:15 AM Lyndsey Queen MD [facility] Denisha Sanchez

## 2019-12-24 NOTE — PROGRESS NOTES
PT DAILY TREATMENT NOTE 10-18    Patient Name: Compa Ivy  Date:2019  : 1955  [x]  Patient  Verified  Payor: VA MEDICARE / Plan: VA MEDICARE PART A & B / Product Type: Medicare /    In time: 9:04 Out time: 9:32  Total Treatment Time (min): 28  Visit #: 9 of 12    Medicare/BCBS Only   Total Timed Codes (min):  28 1:1 Treatment Time:  28       Treatment Area: Lower extremity weakness [R29.898]  Hemiplegia, unspecified affecting right dominant side [G81.91]    SUBJECTIVE  Pain Level (0-10 scale) 0/10  Any medication changes, allergies to medications, adverse drug reactions, diagnosis change, or new procedure performed?: [x] No    [] Yes (see summary sheet for update)  Subjective functional status/changes:   [] No changes reported  Pt reports no current pain. He notes he can feel when his knee locks when he is walking. He has been trying to take a bigger left step since yesterday. He hasn't seen much movement come back in his hand but he is still working on it.        OBJECTIVE    28 min Neuromuscular Re-education:  [] See flow sheet : see below for detail   Rationale: increase ROM and increase strength to improve the patients ability to increase ease with walking and performing ADLs    With   [x] TE   [] TA   [] neuro   [] other: Patient Education: [x] Review HEP    [] Progressed/Changed HEP based on:   [] positioning   [] body mechanics   [] transfers   [] heat/ice application    [] other:      Other Objective/Functional Measures:   Session focused on breaking down gait pattern in // for neuromuscular re-education  First worked on swing phase of gait on the right  Facilitated knee flexion with anterior drive of the pelvis into heel strike  Pt has decreased calf flexibility and has been compensating with pelvic elevation which creates and locks pt in extensor synergy pattern  He struggled with dissociating the movement and will do well with working in supine and sidelying to address pelvic depression prior to hip movement from extension to flexion    Then worked on midstance which pt did much better with to unlock knee at midstance and increase left step length; pt has homework to solely focus on this for the next few days as to not overwhelm his thinking with gait corrections    His gait speed is a bit decreased with use of SBQC due to placing all 4 points and I think fluidity would improve with trial of SPC    Pain Level (0-10 scale) post treatment: 0/10    ASSESSMENT/Changes in Function: Mr. Robinson Gerber remains highly receptive to PT feedback and recommendations regarding gait and progression with therapy. He struggles most with dissociation of movement patterns to decrease synergy and improve gait pattern. He compensates with pelvic elevation for swing phase which limits ability to flex his knee for swing into heel strike. He is limited by calf tightness as well at initial contact in order to effectively perform DF. As far as midstance he has improved awareness of preventing using locking mechanism of knee to better engage his quad for a more effective left step through pattern. He will need repetition of movement patterns as well as focus on pelvic PNF patterns to better address compensations to progress gait pattern to using a LRAD with continued decreased fall risk. Progress towards goals / Updated goals:  1. Pt will ambulate 4 steps x 3 with 1 HR with supervision to be able to navigate stairs at home. Progressing but compensation due to lack of HS strength for foot clearance; compensates with pelvic elevation and circumduction  2. Pt will increase FOTO score by 7 pts to improve LE function. Not assessed  3. Pt will increase right LE hip, quad and HS strength to 4/5 or greater to be able to navigate community distances.   Slowly progressing. 12/2/19  4. Pt will demonstrate Romberg EO/EC on a compliant surface to decrease fall risk during ADL's.   Progressing.  Pt is able to maintain balance on foam with EO, but cont to have moderate difficulty with EC. 12/18/19  5. Pt will ambulate with LRAD >150 ft including an obstacle course to be able to navigate short community distances safely.   Met/Progressing. Pt wants to work on increasing endurance.  12/9/19    PLAN  [x]  Upgrade activities as tolerated     [x]  Continue plan of care  []  Update interventions per flow sheet       []  Discharge due to:_  []  Other:_      Sim Ballesteros PTA 12/24/2019  2:02 PM    Future Appointments   Date Time Provider Marc Ruvalcaba   12/27/2019 10:30 AM Morris Elizabeth, OTR/L [facility]   12/27/2019 11:15 AM Wong Fountain, SILVERIO MMCPTPB [facility]   12/30/2019  9:45 AM SILVERIO Gould AWUNFVE [facility]   12/30/2019 10:30 AM Caren Harding, PT [facility]CPTP[facility]   12/30/2019 11:00 AM Morris Elizabeth, OTR/L [facility]CP[facility]B [facility]   12/31/2019  9:45 AM Morris Elizabeth, OTR/L [facility]CPTPB [facility]   12/31/2019 10:30 AM Juvenal Mccarthy PTA [facility]   12/31/2019 11:30 AM SILVERIO Ray MMCPTPB [facility]   1/2/2020  9:45 AM Christiano Cabrera ORVNQBM [facility]   1/6/2020  9:45 AM SILVERIO Gould LYMASCZ [facility]   1/6/2020 10:30 AM Juvenal Mccarthy PTA MMCPTPB [facility]   1/6/2020 11:00 AM Morris Elizabeth, OTR/L [facility]CPTPB [facility]   1/8/2020  9:45 AM Neville Hernandez, SLP [id number] [facility]   1/8/2020 10:30 AM Junie Yeung, PT MMCPTPB [facility]   1/8/2020 11:00 AM Morris Elizabeth, OTR/L [facility]CPTPB [facility]   1/10/2020  9:45 AM Morris Elizabeth, OTR/L MMCPTPB [facility]   1/13/2020 10:15 AM Seymourne Backer [id number] [facility]   1/13/2020 11:15 AM Pleasant [place] I, SLP MMCPTPB [facility]   1/13/2020 12:00 PM Junie Yeung, PT [facility]CP[facility]   1/15/2020  1:00 PM Juvenal Mccarthy, PTA [facility]CPTP[facility]   1/15/2020  1:30 PM Pleasant [place] I, SLP NOZOEOR [facility]   1/15/2020  2:30 PM Morris Elizabeth, OTR/L MMCPTPB [facility]   1/17/2020  9:00 AM Sharene Backer UOROXYU [facility]   1/20/2020  9:45 AM Neville Hernandez, SLP HKOEEDK [facility]   1/20/2020 10:30 AM Junie Yeung, PT [facility]CPTPB [facility]   1/20/2020 11:00 AM Sharene Backer [id number] [facility]   1/22/2020 10:15 AM Kaiden Espino, OTR/L [facility]   1/22/2020 11:00 AM Nneka Odom, PT [facility]   1/22/2020 11:30 AM Jenise Vazquez I, SILVERIO [id number] [facility]   1/24/2020  9:45 AM Kaiden Espino, OTR/L MMCPTPB [facility]   1/27/2020  9:45 AM Nick Leigh, SILVERIO [id number] [facility]   1/27/2020 10:30 AM Nneka Odom, PT [facility]CPTPB [facility]   1/27/2020 11:00 AM Severa Saba BVOZFTC [facility]   1/29/2020 10:45 AM Nick Leigh, SILVERIO [id number] [facility]   1/29/2020 11:30 AM Satinder Real, LINDA [facility]   1/29/2020 12:30 PM Severa Saba [id number] [facility]   2/10/2020  8:30 AM Alyssia Covarrubias MD [address]   2/14/2020 10:15 AM Brenden Elliott MD [facility] Ju Lassiter

## 2019-12-24 NOTE — TELEPHONE ENCOUNTER
Patient called asking about [facility] order requesting for supplies needed for bipap machine. Patient then called [facility] and they called the office saying they need the order faxed to them for  and  with the settings included. Please advise.   [facility]- [phone]   [facility] Fax- [phone]

## 2019-12-24 NOTE — PROGRESS NOTES
OT DAILY TREATMENT NOTE 10-18    Patient Name: Nivia Lot  Date:2019  : 1955  [x]  Patient  Verified  Payor: VA MEDICARE / Plan: VA MEDICARE PART A & B / Product Type: Medicare /    In time: 269  Out time: [id number]  Total Treatment Time (min): 38  Visit #: 6 of 12    Medicare/BCBS Only   Total Timed Codes (min):  38 1:1 Treatment Time:  38     Treatment Area: Upper extremity weakness [R29.898]  Cerebral infarction, unspecified [I63.9]    SUBJECTIVE  Pain Level (0-10 scale): 010  Any medication changes, allergies to medications, adverse drug reactions, diagnosis change, or new procedure performed?: [x] No    [] Yes (see summary sheet for update)  Subjective functional status/changes:   [] No changes reported  How are you doing? ?  Yes ma'am I try that at home (re: weight bearing down to right forearm on couch arm rest)     OBJECTIVE  25 min Therapeutic Exercise:  [] See flow sheet :   Rationale: increase ROM and increase strength to improve the patients ability to reach, use Right UE     Seated abduction/adduction x10 assist for weight of UE   Seated elbow flex/ext x20 towel slides   Shoulder shrugs/retraction 20x   PROM to digits flex/ext 10x each digit with place and hold     13 min Neuromuscular Re-education:  []  See flow sheet :   Rationale: increase ROM, increase strength and improve coordination  to improve the patients ability to use Right UE     Weight bearing through right UE support at wrist/elbow with functional reach of left hand to  and place resistive clothes pin on tree x40     Weight bearing down to forearm and up supporting at wrist/elbow right UE 2x10 reps         With   [] TE   [] TA   [] neuro   [] other: Patient Education: [x] Review HEP    [] Progressed/Changed HEP based on:   [] positioning   [] body mechanics   [] transfers   [] heat/ice application   [] Splint wear/care   [] Sensory re-education   [] scar management      [] other:             Other Objective/Functional Measures:     Rest breaks encouraged with decreased pacing to ensure proper form of therex and activities      Pain Level (0-10 scale) post treatment: 0/10    ASSESSMENT/Changes in Function: Continues to progress with SROM/AAROM/PROM per pt report to right UE     Patient will continue to benefit from skilled OT services to modify and progress therapeutic interventions, address ROM deficits, address strength deficits and instruct in home and community integration to attain remaining goals. []  See Plan of Care  []  See progress note/recertification  []  See Discharge Summary         Progress towards goals / Updated goals:  1.  Patient will be able to actively grasp and release light items with right hand to assist with self care tasks.     2.  Patient will be able to actively flex shoulder to 45 degrees to improve ability to place arm through sleeve.   Status at last visit; 30 degrees 12/13/19 progressing 12/16/18, able to keep in flexion in supine for more than one minute 12/18/19     3.  Patient will be able to actively extend wrist to posiiton hand for grasp of items.       4.  Patient will report improved ability to use right hand to assist with home care as shown by reduced limitation of at least 20% on Neuro quality of Life    PLAN  []  Upgrade activities as tolerated     [x]  Continue plan of care  []  Update interventions per flow sheet       []  Discharge due to:_  []  Other:_      DARELL Peralta 12/24/2019  9:46 AM    Future Appointments   Date Time Provider Marc Ruvalcaba   12/24/2019 10:15 AM SILVERIO Swartz MMCPTPB [facility]   12/27/2019 10:30 AM MALLIKA Li/L [facility]CPTPB [facility]   12/27/2019 11:15 AM SILVERIO Price MMCPTPB [facility]   12/30/2019  9:45 AM SILVERIO Swartz MMCPTPB [facility]   12/30/2019 10:30 AM Beti Kelley, SUKHWINDER IPMDORD [facility]   12/30/2019 11:00 AM MALLIKA Li/L MMCPTPB [facility]   12/31/2019  9:45 AM Rafaela Bee, OTR/L [facility]   12/31/2019 10:30 AM Maeola Poot, PTA MMCPTPB [address]   12/31/2019 11:30 AM Pleasant [place] I, SLP [facility]TP[facility] [address]   1/2/2020  9:45 AM Sharene Backer [id number] [address]   1/6/2020  9:45 AM Nevilel Hernandez, SLP [facility]TP[facility] [address]   1/6/2020 10:30 AM Maeola Poot, PTA [facility] [address]   1/6/2020 11:00 AM Morris Vineland, OTR/L [facility] [address]   1/8/2020  9:45 AM Neville Hernandez, SLP [id number] [address]   1/8/2020 10:30 AM Junie Yeung, PT [facility] [address]   1/8/2020 11:00 AM Morris Glenn, OTR/L [facility] [address]   1/10/2020  9:45 AM Morris Glenn, OTR/L [facility] [address]   1/13/2020 10:15 AM Sharene Backer FOZKALO [address]   1/13/2020 11:15 AM Ple[place] I, SLP CXKSKEQ [address]   1/13/2020 12:00 PM Junie Yeung, PT [facility] [address]   1/15/2020  1:00 PM Maeola Poot, PTA [facility] [address]   1/15/2020  1:30 PM Ple[place] I, SLP YFARFCS [address]   1/15/2020  2:30 PM Morris Vineland, OTR/L [facility] [address]   1/17/2020  9:00 AM Sharene Backer VICYCOC [address]   1/20/2020  9:45 AM Neville Hernandez, SLP [facility] [address]   1/20/2020 10:30 AM Junie Yeung, PT [facility] [address]   1/20/2020 11:00 AM Sharene Backer ENVUDIC [address]   1/22/2020 10:15 AM Morris Vineland, OTR/L [facility] [address]   1/22/2020 11:00 AM Junie Yeung, PT [facility] [address]   1/22/2020 11:30 AM Ple[place] I, SLP [facility] [address]   1/24/2020  9:45 AM Morris Elizabeth, OTR/L [facility] [address]   1/27/2020  9:45 AM Ple[place] I, SLP [facility]CPTP[facility] [address]   1/27/2020 10:30 AM Junie Yeung, PT [facility] [address]   1/27/2020 11:00 AM Sharene Backer KPOMKKN [address]   1/29/2020 10:45 AM Neville Hernandez, SLP VCAARIN [address]   1/29/2020 11:30 AM Juvenal Mccarthy, PTA MMCPTPB [address]   1/29/2020 12:30 PM Sharene Backer [id number] [address]   2/10/2020  8:30 AM Marisabel Tamayo MD [address]   2/14/2020 10:15 AM Afia Jameson MD [facility] Jyothi Hector

## 2019-12-26 ENCOUNTER — TELEPHONE (OUTPATIENT)
Dept: NEUROLOGY | Age: 64
End: 2019-12-26

## 2019-12-26 NOTE — TELEPHONE ENCOUNTER
Pt called in reference to [facility] order. Pt has called multiple times this week. Pt explained [facility] is receiving order for supplies but pt needs order for a new machine and humidifier. I did notify pt that Dr. Magnolia Garcia is out of the office until Jan. 6, 2020. Pt stated that he is having a very difficult time breathing and sleeping. Please advise.

## 2019-12-27 ENCOUNTER — APPOINTMENT (OUTPATIENT)
Dept: PHYSICAL THERAPY | Age: 64
End: 2019-12-27
Payer: MEDICARE

## 2019-12-27 ENCOUNTER — HOSPITAL ENCOUNTER (OUTPATIENT)
Dept: PHYSICAL THERAPY | Age: 64
Discharge: HOME | End: 2019-12-27
Payer: MEDICARE

## 2019-12-27 PROCEDURE — 92526 ORAL FUNCTION THERAPY: CPT

## 2019-12-27 PROCEDURE — 97530 THERAPEUTIC ACTIVITIES: CPT

## 2019-12-27 PROCEDURE — 92507 TX SP LANG VOICE COMM INDIV: CPT

## 2019-12-27 PROCEDURE — 97112 NEUROMUSCULAR REEDUCATION: CPT

## 2019-12-27 NOTE — PROGRESS NOTES
OT DAILY TREATMENT NOTE 10-18    Patient Name: Kaveh November  Date:2019  : 1955  [x]  Patient  Verified  Payor: [place] MEDICARE / Plan: [place] MEDICARE PART A & B / Product Type: Medicare /    In QEWM:2313  Out time:1115  Total Treatment Time (min): 23  Visit #: 7 of 12    Medicare/BCBS Only   Total Timed Codes (min):  23 1:1 Treatment Time:  23     Treatment Area: Upper extremity weakness [R29.898]  Cerebral infarction, unspecified [I63.9]    SUBJECTIVE  Pain Level (0-10 scale): 0/10  Any medication changes, allergies to medications, adverse drug reactions, diagnosis change, or new procedure performed?: [x] No    [] Yes (see summary sheet for update)  Subjective functional status/changes:   [] No changes reported  Using NMES 2x day    OBJECTIVE    10 min Therapeutic Activity:  []  See flow sheet :   Rationale: increase ROM and improve coordination  to improve the patients ability to use arm funcitonally  Grasp and release with extraction vs volitional release of 1 in dowels x 5 from place to place in pegboard     13 min Neuromuscular Re-education:  []  See flow sheet :   Rationale: increase ROM and increase strength  to improve the patients ability to grasp  Recip elbow flex ext in sitting x 10  Recip sup pro with trace response  Quick stretch for wrist ext, tapping wrist ext no response      With   [] TE   [] TA   [] neuro   [] other: Patient Education: [x] Review HEP    [] Progressed/Changed HEP based on:   [] positioning   [] body mechanics   [] transfers   [] heat/ice application   [] Splint wear/care   [] Sensory re-education   [] scar management      [] other:            Other Objective/Functional Measures:   Able to flex elbow and place hand accurately on dowel, remove and replace with max effort and several trials each     Pain Level (0-10 scale) post treatment: 0/10    ASSESSMENT/Changes in Function: slow improvement in functional use.   Better able to isolate elbow flexion vs in cominaiton with hor add when given activity    Patient will continue to benefit from skilled OT services to modify and progress therapeutic interventions, address ROM deficits, address strength deficits, analyze and cue movement patterns and instruct in home and community integration to attain remaining goals. []  See Plan of Care  []  See progress note/recertification  []  See Discharge Summary         Progress towards goals / Updated goals:  *1.  Patient will be able to actively grasp and release light items with right hand to assist with self care tasks. Status at last visit: Able to grasp and extract hand from 1 in dowel 12/27/19     2.  Patient will be able to actively flex shoulder to 45 degrees to improve ability to place arm through sleeve. Status at last visit; 30 degrees 12/13/19 progressing 12/16/18, able to keep in flexion in supine for more than one minute 12/18/19     3.  Patient will be able to actively extend wrist to posiiton hand for grasp of items.   Status at last visit-no active extension noted 12/27/19       4.  Patient will report improved ability to use right hand to assist with home care as shown by reduced limitation of at least 20% on Neuro quality of Life**    PLAN  []  Upgrade activities as tolerated     [x]  Continue plan of care  []  Update interventions per flow sheet       []  Discharge due to:_  []  Other:_      Barbara Dewey, OTR/L 12/27/2019  12:30 PM    Future Appointments   Date Time Provider Marc Ruvalcaba   12/30/2019  9:45 AM SILVERIO Gould MMCPTPB [facility]   12/30/2019 10:30 AM Caren Harding, PT YMCOCTX [facility]   12/30/2019 11:00 AM Morris Elizabeth OTR/L [facility]   12/31/2019  9:45 AM Morris Elizabeth OTR/L [facility]CP[facility]   12/31/2019 10:30 AM Juvenal Mccarthy PTA [facility]CP[facility]   12/31/2019 11:30 AM SILVERIO Ray [facility]CPTPB [facility]   1/2/2020  9:45 AM Christiano ALVAREZ [facility]   1/6/2020  9:45 AM SILVERIO Gould MMCPTPB [facility]   1/6/2020 10:30 AM Juvenal Mccarthy PTA [facility]CP[facility] [address]   1/6/2020 11:00 AM Mena Grippe, OTR/L MMCPTPB [address]   1/8/2020  9:45 AM Yifan Sanchez, SILVERIO ATAPOMV [address]   1/8/2020 10:30 AM Odessia Riding, PT MMCPTPB [address]   1/8/2020 11:00 AM Mena Grippe, OTR/L [facility] [address]   1/10/2020  9:45 AM Mena Grippe, OTR/L [facility] [address]   1/13/2020 10:15 AM Naaman Portal NNNXRTZ [address]   1/13/2020 11:15 AM Ren Chung I, SILVERIO PTFRTNF [address]   1/13/2020 12:00 PM Kian Cardoza, PT MMCPTPB [address]   1/15/2020  1:00 PM Doreen Connell, PTA [facility] [address]   1/15/2020  1:30 PM SILVERIO Clay GKFGPWH [address]   1/15/2020  2:30 PM Mena Grippe, OTR/L [facility] [address]   1/17/2020  9:00 AM Naaman Portal FVFQOSM [address]   1/20/2020  9:45 AM SILVERIO Clay WQCQLBD [address]   1/20/2020 10:30 AM Odessia Riding, PT MMCPTPB [address]   1/20/2020 11:00 AM Naaman Portal APBSQND [address]   1/22/2020 10:15 AM Mena Grippe, OTR/L [facility] [address]   1/22/2020 11:00 AM Odessia Riding, PT [facility]CP[facility] [address]   1/22/2020 11:30 AM Yifan Sanchez, SLP [facility] [address]   1/24/2020  9:45 AM Mena Grippe, OTR/L [facility] [address]   1/27/2020  9:45 AM SILVERIO Clay EYEHQEK [address]   1/27/2020 10:30 AM Kian Cardoza, PT [facility] [address]   1/27/2020 11:00 AM Isidro Ceja NBGRRVG [address]   1/29/2020 10:45 AM SILVERIO Gutierres UJBIHPI [address]   1/29/2020 11:30 AM Doreen Connell PTA MMCP[facility] [address]   1/29/2020 12:30 PM Isidro Portal AQOHALN [address]   2/10/2020  8:30 AM Joby Soler MD [address]   2/14/2020 10:15 AM Sylvain Mejia MD [facility] aKtherine

## 2019-12-27 NOTE — PROGRESS NOTES
ST DAILY TREATMENT NOTE    Patient Name: Evie Ball  Date:2019  : 1955  [x]  Patient  Verified  Payor: Payor: VA MEDICARE / Plan: VA MEDICARE PART A & B / Product Type: Medicare /   In time:11:18  Out time:12:00  Total Treatment Time (min): 42  Visit #: 23  of 36    Treatment Diagnosis: Dysarthria and anarthria [R47.1]    SUBJECTIVE  Pain Level (0-10 scale): 0  Any medication changes, allergies to medications, adverse drug reactions, diagnosis change, or new procedure performed?: [x] No    [] Yes (see summary sheet for update)    Subjective functional status/changes:   [] No changes reported   My swallowing is improving. I am not coughing or gagging when I swallowing. My wife cuts my food up small if it needs to be cut. Pt's wife stated that she often has to tell him to take his time to use a good chin tuck. OBJECTIVE  Treatment provided includes:  Increase/Improve:  []  Voice Quality []  Cognitive Linguistic Skills [x]  Laryngeal/Pharyngeal Exercises   [x]  Vocal Loudness []  Reading Comprehension [x]  Swallowing Skills    []  Vocal Cord Function []  Auditory Comprehension []  Oral Motor Skills   []  Resonance []  Writing Skills [x]  Compensatory strategies    [x]  Speech Intelligibility []  Expressive Language []  Attention   [x]  Breath Support/Coord. []  Receptive language []  Memory   [x]  Articulation [x]  Safety Awareness []    []  Fluency []  Word Retrieval []        Treatment Provided:  - oropharyngeal strengthening exercises with skilled cueing to increase precision   - po trials using compensatory strategies  -/ah/ prolongation with skilled cuing to increase easier onsets, breath support and vocal quality. -  sentences with increased vocal intensity increase intelligibility   - phonemes /l/ and  voiced /th/'productions in sentences for increase articulatory precision   - pt education      Patient/Caregiver  Education: [x]?  Review HEP      HEP/Handouts given: continue HEP established; work harder on decreasing sip size, use of chin tuck in a timely, efficient manner with po.       Pain Level (0-10 scale) post treatment: 0     ASSESSMENT   Progression in independence when completing oropharyngeal strengthening exercises and for production of /l/ and voiced /th/ in all positions of sentences. Carryover into conversation emerging. Min cues to use compensatory/safe swallowing strategies with thin liquids. []? Improving appropriately and progressing toward goals  [x]? Improving slowly and progressing toward goals  []? Approximating goals/maximum potential  [x]? Continues to benefit from skilled therapy to address remaining functional deficits  []? Not progressing toward goals and plan of care will be adjusted     Patient will continue to benefit from skilled therapy to address remaining functional deficits: dysphagia , dysarthria      Progress towards goals / Updated goals:  1. The pt will increase quality and length of phonation by sustaining ah utilizing effective diaphragmatic breath support for >15 seconds when provided with min-modA in 3/3 sessions to increase functional speech intelligibility. 12/27/19 average phonation duration 10.3 seconds range 7-14 seconds with improving vocal quality as he progressed through the trials due to easier onsets of phonation with mod A for cuing/shaping/breath support across 7 trials.      2. The patient will articulate (s/z) consonants at the word level- phrases level with 90% acc with use of comp strategies and OMES  to improve speech intelligibility to > 90% acc when provided with min cues. 12/27/19: not addressed this date.      3. The patient will articulate  (l, voiced /th/ consonants at the sentence to conversational level  with 90% acc with use of comp strategies and OMES  to improve speech intelligibility to > 90% acc when provided with min cues.   12/27/19 progressing: production of functional sentences for phoneme  /l/ in initial, medial and final  positions: 100% acc rocío,/th/ voiced; in the initial and medial position of sentences 100% acc rocío. Correct productions of /l/ emerging in structured conversation for all positions and in consonant sequences.       4.  Pt will demonstrate adequate vocal intensity in varying levels from whisper to yelling with 85% accuracy in structured tasks at the sentence to conversational Chase Ogles provided with min cues.   19 progressin% accuracy with Michael for sentences and and structured conversation.       5. Patient will complete tongue base retraction,and laryngeal/pharyngeal strengthening exercises (including Sheyla maneuver, Mendelsohn maneuver, modified Shaker with CTAR ball, hard /k/ in isolation,  effortful swallow, etc.), with min cues in 5/5 sessions in order to improve the strength/agility/efficiency of his swallow for improved swallowing safety and QOL. 19 progressing: sheyla x25 with S, extended yawn x10 with Michael, modified shaker with CTAR ball for three 30 sec trials with min A, tongue pull backs hold 5 secs to increase TBR with min A x20      6. Patient will recall/demonstrate aspiration precautions and safe swallowing strategies with 100% acc when provided with occasional cues in 5/5 sessions (small sips/bites; chin tuck with all liquid intake; alternate liquids/solids; slow rate; NO straws, Sit upright at 90 degree angle during PO trials)  to decrease the reported incidences of dysphagia and s/sx of aspiration.   19 progressing: with Michael during po trials. cough x 1 and throat clear x2 for consumption of ~6 oz of thin liquids due to inproper use of chin tuck (incomplete, too late and too large sip size).       PLAN  [x]  Continue plan of care  []  Modify Goals/Treatment Plan      []  Discharge due to:  [] Other:    SILVERIO Michaud 2019  11:04 AM    Future Appointments   Date Time Provider Marc Ruvalcaba   2019 11:15 AM Severiano Searles S, SLP [facility]   12/30/2019  9:45 AM SILVERIO Morrison AZCYRFP [facility]   12/30/2019 10:30 AM Jake Cantu, PT RJJVHQR [facility]   12/30/2019 11:00 AM Voncille Limber, OTR/L [facility]CP[facility]   12/31/2019  9:45 AM Voncille Limber, OTR/L [facility]CPTP[facility]   12/31/2019 10:30 AM Ankita Burdick, PTA [facility]   12/31/2019 11:30 AM SILVERIO Razo [facility]CPTPB [facility]   1/2/2020  9:45 AM Dai Medici POSIVIH [facility]   1/6/2020  9:45 AM SILVERIO Morrison MMCPTPB [facility]   1/6/2020 10:30 AM Ankita Burdick, PTA [facility]   1/6/2020 11:00 AM Angeline Limber, OTR/L [facility]   1/8/2020  9:45 AM SILVERIO Morrison NDRFYMD [facility]   1/8/2020 10:30 AM Iliana Cesar, PT [facility]CP[facility]   1/8/2020 11:00 AM Voncille Limber, OTR/L [facility]   1/10/2020  9:45 AM Voncille Limber, OTR/L [facility]   1/13/2020 10:15 AM Dai Medici SQAOJLX [facility]   1/13/2020 11:15 AM SILVERIO Razo SUANTELMO [facility]   1/13/2020 12:00 PM Iliana Cesar, PT [facility]CP[facility]   1/15/2020  1:00 PM Ankita Burdick, PTA [facility]   1/15/2020  1:30 PM SILVERIO Razo HYGUEIH [facility]   1/15/2020  2:30 PM Angeline Bustamante, OTR/L [facility]CP[facility]   1/17/2020  9:00 AM Dai Medici HXRVVBA [facility]   1/20/2020  9:45 AM SILVERIO Morrison MMCPTPB [facility]   1/20/2020 10:30 AM Iliana Cesar, PT [facility]   1/20/2020 11:00 AM Dai Medici RUEXESA [facility]   1/22/2020 10:15 AM Angeline Bustamante, OTR/L [facility]   1/22/2020 11:00 AM Iliana Cesar, PT [facility]CP[facility]   1/22/2020 11:30 AM SILVERIO Razo [facility]CPTPB [facility]   1/24/2020  9:45 AM Angeline Bustamante, OTR/L [facility]   1/27/2020  9:45 AM SILVERIO Razo MMCPTPB [facility]   1/27/2020 10:30 AM Iliana Cesar, PT [facility]CPTP[facility]   1/27/2020 11:00 AM Dai Medici EJCEOVA [facility]   1/29/2020 10:45 AM SILVERIO Morrison MNXOJRU [facility]   1/29/2020 11:30 AM Ankita Burdick PTA MMCPTPB [facility]   1/29/2020 12:30 PM Dai Savage KGUREYO [facility]   2/10/2020  8:30 AM Yocasta Ho MD [address]   2/14/2020 10:15 AM Umesh Torres MD [facility] Rochelle Osgood

## 2019-12-27 NOTE — PROGRESS NOTES
[facility]  [place]  [address] [place] [address]  [phone] fax    Continued Plan of Care/ Re-certification for Physical Therapy Services    Patient name: Hong Hernandez Start of Care: 10/22/19   Referral source: Jeovany Murphy NP : 1955   Medical/Treatment Diagnosis: Lower extremity weakness [R29.898]  Hemiplegia, unspecified affecting right dominant side [G81.91]  Payor: VA MEDICARE / Plan: VA MEDICARE PART A & B / Product Type: Medicare /  Onset Date:19     Prior Hospitalization: see medical history Provider#: [id number]   Medications: Verified on Patient Summary List     Comorbidities: Diabetes, HBP, OA of right Ankle,Kidney disease, Dysphagia and /Dysarthria   Prior Level of Function: Retired [employer]. Likes to attend [employer] reinactments,     Visits from Start of Care: 21    Missed Visits: 0    The Plan of Care and following information is based on the patient's current status:  Goal: Pt will ambulate 4 steps x 3 with 1 HR with supervision to be able to navigate stairs at home  Status at last note/certification: Progressing but compensation due to lack of HS strength for foot clearance; compensates with pelvic elevation and circumduction  Current Status: not met    Goal: Pt will increase FOTO score by 7 pts to improve LE function  Status at last note/certification: Not assessed d/t time constraint   Current Status: not met    Goal: Pt will increase right LE hip, quad and HS strength to 4/5 or greater to be able to navigate community distances. Status at last note/certification: Met/Progressing. Right hip and quad strength 4/5 at last progress note. Right hamstring strength currently 2/5   Current Status: not met    Goal: Pt will demonstrate Romberg EO/EC on a compliant surface to decrease fall risk during ADL's.   Status at last note/certification: Progressing.  Pt is able to maintain balance on foam with EO, but cont to have moderate difficulty with EC. Current Status: not met    Goal: Pt will ambulate with LRAD >150 ft including an obstacle course to be able to navigate short community distances safely.   Status at last note/certification: Met/Progressing. Pt wants to work on increasing endurance. Current Status: met    Key functional changes: improving ambulatory ability, slowly improving right LE strength, improving static balance     Problems/ barriers to goal attainment: lack of further neuromuscular return      Problem List: decrease ROM, decrease strength, impaired gait/ balance, decrease ADL/ functional abilitiies, decrease activity tolerance, decrease flexibility/ joint mobility and decrease transfer abilities    Treatment Plan: Therapeutic exercise, Therapeutic activities, Neuromuscular re-education, Physical agent/modality, Gait/balance training, Manual therapy, Patient education, Self Care training, Functional mobility training, Home safety training and Stair training     Patient Goal (s) has been updated and includes: be able to walk better/farther     Goals for this certification period to be accomplished in 4 weeks:  1. Pt will ambulate 4 steps x 3 with 1 HR with supervision to be able to navigate stairs at home. 2. Pt will increase FOTO score by 7 points in order to demonstrate improved functional ability   3. Pt will increase right hamstring strength to 2+/5 in order to improve right foot clearance and normalize gait pattern for increased ambulatory tolerance and reduced fall risk. 4. Pt will demonstrate Romberg EC on a compliant surface for 15 seconds without LOB in order to demonstrate improved stability in poorly lit environments. 5. Pt will ambulate >150' with SPC and no evidence of instability in order to improve ease of household negotiation with LRAD.     Frequency / Duration: Patient to be seen 2-3 times per week for 4 weeks:    Assessment / Recommendations: Pt is making slow, steady progress in therapy, progressing towards 4/5 updated goals. FOTO score was not assessed d/t time constraint. He demonstrates slowly improving right LE strength but continues to demonstrate limited hamstring strength (2/5), limiting knee flexion against gravity during swing phase of gait. He utilizes compensatory pelvic elevation with gait which is contributing to extensor synergy and decreased ability to flex knee for foot clearance during swing phase. Decreased gastroc/soleus flexibility as well as synergy pattern is decreasing DF mobility and heel strike on right with ambulation. Pt demonstrates increased quad control/proprioception and is more aware of decreasing knee locking mechanism during midstance, allowing for increased step through gait pattern. He continues to use TheraCoat for ambulation but may benefit from gait training with SPC to increase fluidity of gait for increased ambulatory tolerance. Pt will benefit from continued skilled PT to address strength, ROM, flexibility, balance, and functional mobility deficits in order to maximize independence, improve ease/safety with ambulation, and improve QOL. Certification Period: 12/20/19 to 1/18/20     Dorian Newman, PT 12/27/2019 8:04 AM    ________________________________________________________________________  I certify that the above Therapy Services are being furnished while the patient is under my care. I agree with the treatment plan and certify that this therapy is necessary. [] I have read the above and request that my patient continue as recommended.   [] I have read the above report and request that my patient continue therapy with the following changes/special instructions: _______________________________________  [] I have read the above report and request that my patient be discharged from therapy    Physician's Signature:____________Date:_________TIME:________    ** Signature, Date and Time must be completed for valid certification **    Please sign and return to [facility] [place]  [address] [place] [address]  [phone] fax

## 2019-12-27 NOTE — PROGRESS NOTES
OT DAILY TREATMENT NOTE 10-18    Patient Name: Josh Casillas  Date:2019  : 1955  [x]  Patient  Verified  Payor: [place] MEDICARE / Plan: [place] MEDICARE PART A & B / Product Type: Medicare /    In time:***  Out time:***  Total Treatment Time (min): ***  Visit #: *** of ***    Medicare/BCBS Only   Total Timed Codes (min):  *** 1:1 Treatment Time:  ***     Treatment Area: Upper extremity weakness [R29.898]  Cerebral infarction, unspecified [I63.9]    SUBJECTIVE  Pain Level (0-10 scale): ***  Any medication changes, allergies to medications, adverse drug reactions, diagnosis change, or new procedure performed?: [x] No    [] Yes (see summary sheet for update)  Subjective functional status/changes:   [] No changes reported  ***    OBJECTIVE    Modality rationale: {BSHSI INMOTION MODALITIES:50240} to improve the patients ability to ***   Min Type Additional Details    [] Estim:  []Unatt       []IFC  []Premod                        []Other:  []w/ice   []w/heat  Position:  Location:    [] Estim: []Att    []TENS instruct  []NMES                    []Other:  []w/US   []w/ice   []w/heat  Position:  Location:    []  Traction: [] Cervical       []Lumbar                       [] Prone          []Supine                       []Intermittent   []Continuous Lbs:  [] before manual  [] after manual    []  Ultrasound: []Continuous   [] Pulsed                           []1MHz   []3MHz W/cm2:  Location:    []  Iontophoresis with dexamethasone         Location: [] Take home patch   [] In clinic    []  Ice     []  heat  []  Ice massage  []  Laser   []  Anodyne Position:  Location:    []  Laser with stim  []  Other:  Position:  Location:    []  Vasopneumatic Device Pressure:       [] lo [] med [] hi   Temperature: [] lo [] med [] hi       [] Skin assessment post-treatment:  []intact []redness- no adverse reaction    []redness  adverse reaction:     *** min []Eval                  []Re-Eval       *** min Therapeutic Exercise:  [] See flow sheet :   Rationale: {BSHSI IMMOTION THER EX:95678} to improve the patients ability to ***    *** min Therapeutic Activity:  []  See flow sheet :   Rationale: {BSHSI IMMOTION THER EX:95482}  to improve the patients ability to ***     *** min Neuromuscular Re-education:  []  See flow sheet :   Rationale: {BSHSI IMMOTION THER EX:46176}  to improve the patients ability to ***    *** min Manual Therapy:  ***   Rationale: {BSHSI IMMOTION MANUAL THERAPY:76673} to ***     min Orthotic/Splinting: ***   Rationale: {BSHSI IMMOTION THER EX:34984}  to improve the patients ability to ***    *** min Self Care/Home Management: ***   Rationale: {BSHSI IMMOTION THER EX:04660}  to improve the patients ability to ***    With   [] TE   [] TA   [] neuro   [] other: Patient Education: [x] Review HEP    [] Progressed/Changed HEP based on:   [] positioning   [] body mechanics   [] transfers   [] heat/ice application   [] Splint wear/care   [] Sensory re-education   [] scar management      [] other:            Other Objective/Functional Measures: ***     Pain Level (0-10 scale) post treatment: ***    ASSESSMENT/Changes in Function: ***    Patient will continue to benefit from skilled OT services to {[facility] INMOTION ASSESSMENT STATEMENTS:34143} to attain remaining goals. []  See Plan of Care  []  See progress note/recertification  []  See Discharge Summary         Progress towards goals / Updated goals:  1.  Patient will be able to actively grasp and release light items with right hand to assist with self care tasks.     2.  Patient will be able to actively flex shoulder to 45 degrees to improve ability to place arm through sleeve.   Status at last visit; 30 degrees 12/13/19 progressing 12/16/18, able to keep in flexion in supine for more than one minute 12/18/19     3.  Patient will be able to actively extend wrist to posiiton hand for grasp of items.       4.  Patient will report improved ability to use right hand to assist with home care as shown by reduced limitation of at least 20% on Neuro quality of Life    PLAN  []  Upgrade activities as tolerated     []  Continue plan of care  []  Update interventions per flow sheet       []  Discharge due to:_  []  Other:_      Paulette Nolasco OTR/L 12/27/2019  10:32 AM    Future Appointments   Date Time Provider Marc Peg   12/27/2019 11:15 AM Darryle Officer, SLP MMCPTPB [facility]   12/30/2019  9:45 AM SILVERIO Esqueda MVCXNKB [facility]   12/30/2019 10:30 AM Ct Sorto PT MMCPTPB [facility]   12/30/2019 11:00 AM Sen Contreras, OTR/L MMCPTPB [facility]   12/31/2019  9:45 AM Sen Contreras, OTR/L [facility]CPTPB [facility]   12/31/2019 10:30 AM Sukumar Pitt, LINDA MMCPTPB [facility]   12/31/2019 11:30 AM SILVERIO Fernandez MMCPTPB [facility]   1/2/2020  9:45 AM Jasmin Choudhary SDXHNGX [facility]   1/6/2020  9:45 AM SILVERIO Esqueda MMCPTPB [facility]   1/6/2020 10:30 AM Sukumar Pitt PTA MMCPTPB [facility]   1/6/2020 11:00 AM Sen Contreras, OTR/L MMCPTPB [facility]   1/8/2020  9:45 AM SILVERIO Esqueda MMCPTPB [facility]   1/8/2020 10:30 AM Peg Burgos, PT [facility]CPTPB [facility]   1/8/2020 11:00 AM Sen Grumbles, OTR/L MMCPTPB [facility]   1/10/2020  9:45 AM Sen Coolles, OTR/L MMCPTPB [facility]   1/13/2020 10:15 AM Jasmin Crew [id number] [facility]   1/13/2020 11:15 AM Tish Arce I, SILVERIO IVWNAHM [facility]   1/13/2020 12:00 PM Peg Burgos, PT MMCPTPB [facility]   1/15/2020  1:00 PM Sukumar Pitt, PTA [facility]CPTPB [facility]   1/15/2020  1:30 PM SILVERIO Fernandez MMCPTPB [facility]   1/15/2020  2:30 PM Sen Contreras, OTR/L MMCPTPB [facility]   1/17/2020  9:00 AM Jasmin Crew [id number] [facility]   1/20/2020  9:45 AM SILVERIO Esqueda MMCPTPB [facility]   1/20/2020 10:30 AM Peg Burgos, PT [facility]CPTP[facility]   1/20/2020 11:00 AM Jasmin Crew AIXPTTA [facility]   1/22/2020 10:15 AM Sen Contreras, OTR/L MMCPTPB [facility]   1/22/2020 11:00 AM Peg Burgos, PT [facility]CPTPB [facility]   1/22/2020 11:30 AM SILVERIO Esqueda MMCPTPB [facility]   1/24/2020  9:45 AM Sen Contreras, OTR/L MMCPTPB [facility]   1/27/2020  9:45 AM SILVERIO Esqueda LLPIJPQ [facility]   1/27/2020 10:30 AM Jaylan Hernandez, PT MMCPTPB [facility]   1/27/2020 11:00 AM Rice Savanna JMFYJCF [facility]   1/29/2020 10:45 AM Lainey Miller, SLP MABPQBA [facility]   1/29/2020 11:30 AM Kishore Perez, PTA MMCPTPB [facility]   1/29/2020 12:30 PM Roe Corrales EIZUQBO [facility]   2/10/2020  8:30 AM Levy Ventura MD [address]   2/14/2020 10:15 AM Katherine Duvall MD [facility] Kristie Grande

## 2019-12-30 ENCOUNTER — HOSPITAL ENCOUNTER (OUTPATIENT)
Dept: PHYSICAL THERAPY | Age: 64
Discharge: HOME | End: 2019-12-30
Payer: MEDICARE

## 2019-12-30 ENCOUNTER — APPOINTMENT (OUTPATIENT)
Dept: PHYSICAL THERAPY | Age: 64
End: 2019-12-30
Payer: MEDICARE

## 2019-12-30 PROCEDURE — 97112 NEUROMUSCULAR REEDUCATION: CPT

## 2019-12-30 PROCEDURE — 97530 THERAPEUTIC ACTIVITIES: CPT

## 2019-12-30 PROCEDURE — 92526 ORAL FUNCTION THERAPY: CPT

## 2019-12-30 PROCEDURE — 92507 TX SP LANG VOICE COMM INDIV: CPT

## 2019-12-30 NOTE — PROGRESS NOTES
PT DAILY TREATMENT NOTE 10-18    Patient Name: Katie Mcgarry  Date:2019  : 1955  [x]  Patient  Verified  Payor: VA MEDICARE / Plan: VA MEDICARE PART A & B / Product Type: Medicare /    In time:10:30  Out time:11:00  Total Treatment Time (min): 30  Visit #: 2 of 12    Medicare/BCBS Only   Total Timed Codes (min):  30 1:1 Treatment Time:  30       Treatment Area: Lower extremity weakness [R29.898]  Hemiplegia, unspecified affecting right dominant side [G81.91]    SUBJECTIVE  Pain Level (0-10 scale): 0/10  Any medication changes, allergies to medications, adverse drug reactions, diagnosis change, or new procedure performed?: [x] No    [] Yes (see summary sheet for update)  Subjective functional status/changes:   [] No changes reported  Pt reports he has been trying to work on his walking with some of the tips he was given last session. He took his BP medication around 7:30am today. Denies any abnormal sx. OBJECTIVE    30 min Neuromuscular Re-education:  [x]  See flow sheet :   Rationale: increase ROM, increase strength, improve coordination, improve balance and increase proprioception  to improve the patients ability to improve gait pattern for improved ease/safety with ambulation         With   [] TE   [] TA   [] neuro   [] other: Patient Education: [x] Review HEP    [] Progressed/Changed HEP based on:   [] positioning   [] body mechanics   [] transfers   [] heat/ice application    [x] other: instructed pt to monitor BP and call MD if BP remained high.   Reviewed signs/sx of heart attack and stroke and instructed pt to go to emergency room immediately if he ever experienced signs/sx       Other Objective/Functional Measures:     /73, heart rate 63bpm taken electronically prior to therapy     Required left LE to assist with concentric and eccentric portion of prone hamstring curl  Tactile cues at ischial tuberosity for pelvic depression, correct form with verbal/tactile cuing  Performed sidelying powder board knee flexion with hip in extension and tactile facilitation to hamstrings, able to complete in ~90% of full range of knee flexion with hip in extension  Tactile cues for pelvic depression prior to sidelying facilitated swing  Tactile cues at posterior knee to avoid genu recurvatum during right midstance, significant reduction in genu recurvatum with cuing with continued reps  Tactile hamstring facilitation and tactile cues for pelvic depression with standing HSC with hip in extension, improved form with cuing but continued to elevated pelvis    /70, heart rate 67 bpm taken electronically following session, pt denied any abnormal sx       Pain Level (0-10 scale) post treatment: 0/10    ASSESSMENT/Changes in Function:     Pt is making slow, steady progress towards updated goals in therapy. He is able to isolate pelvic depression with therapy interventions but continues to require cuing to avoid returning to prior movement pattern with pelvic elevation compensation. Significant reduction in genu recurvatum during right SL therapy interventions was evident with cuing, and pt was instructed to focus on this with household ambulation. Knee flexion interventions were performed with hip in extension to reduce synergy pattern. Will continue to address strength, coordination, and static/dynamic balance deficits in order to improve ease/safety with daily tasks. Patient will continue to benefit from skilled PT services to modify and progress therapeutic interventions, address functional mobility deficits, address ROM deficits, address strength deficits, analyze and address soft tissue restrictions, analyze and cue movement patterns, analyze and modify body mechanics/ergonomics, assess and modify postural abnormalities, address imbalance/dizziness and instruct in home and community integration to attain remaining goals. Progress towards goals / Updated goals:  1.  Pt will ambulate 4 steps x 3 with 1 HR with supervision to be able to navigate stairs at home.   2. Pt will increase FOTO score by 7 points in order to demonstrate improved functional ability   3. Pt will increase right hamstring strength to 2+/5 in order to improve right foot clearance and normalize gait pattern for increased ambulatory tolerance and reduced fall risk. 4. Pt will demonstrate Romberg EC on a compliant surface for 15 seconds without LOB in order to demonstrate improved stability in poorly lit environments.    5. Pt will ambulate >150' with SPC and no evidence of instability in order to improve ease of household negotiation with [address] [place] [address] [place]  []  Upgrade activities as tolerated     [x]  Continue plan of care  []  Update interventions per flow sheet       []  Discharge due to:_  []  Other:_      Angela Burgos, PT 12/30/2019  10:32 AM    Future Appointments   Date Time Provider Marc Ruvalcaba   12/30/2019 11:00 AM Michael Washington, OTR/L MMCPTPB [facility]   12/31/2019  9:45 AM Michael Washington OTR/L MMCPTPB [facility]   12/31/2019 10:30 AM Padmaja Maciel PTA [facility]CPTP[facility]   12/31/2019 11:30 AM SILVERIO Mesa MMCPTPB [facility]   1/2/2020  9:45 AM Trude Mates [id number] [facility]   1/6/2020  9:45 AM SILVERIO Martin NYFOVWP [facility]   1/6/2020 10:30 AM Padmaja Maciel PTA MMCPTPB [facility]   1/6/2020 11:00 AM Michael Washington, OTR/L MMCPTPB [facility]   1/8/2020  9:45 AM SILVERIO Martin MOXWZWB [facility]   1/8/2020 10:30 AM Chata Hale PT MMCPTPB [facility]   1/8/2020 11:00 AM Mat Felix, OTR/L [facility]CPTPB [facility]   1/10/2020  9:45 AM Mat Felix, OTR/L MMCPTPB [facility]   1/13/2020 10:15 AM Felicitas Vizcaino TDMDXBC [facility]   1/13/2020 11:15 AM Sebastian Lundy I, SLP UQRFHKA [facility]   1/13/2020 12:00 PM Chata Hale, PT MMCPTPB [facility]   1/15/2020  1:00 PM Padmaja Maciel, PTA MMCPTPB [facility]   1/15/2020  1:30 PM Sebastian Lundy I, SLP [id number] [facility]   1/15/2020  2:30 PM Michael Washington, OTR/L MMCPTPB [facility]   1/17/2020  9:00 AM Felicitas Vizcaino CZMQNMB [facility]   1/20/2020  9:45 AM Sanjay Iyer, Sara Min, SLP MMCPTPB [facility]   1/20/2020 10:30 AM Chelle Garrett, PT MMCPTPB [facility]   1/20/2020 11:00 AM Hoda Beyer LKNAWIB [facility]   1/22/2020 10:15 AM Claudia Araujo, OTR/L MMCPTPB [facility]   1/22/2020 11:00 AM [facility] Garrett[facility]   1/22/2020 11:30 AM SILVERIO Stinson TUGAZHN [facility]   1/24/2020  1:15 PM Claudia Araujo, OTR/L MMCPTPB [facility]   1/27/2020  9:45 AM SILVERIO Stinson [id number] [facility]   1/27/2020 10:30 AM Chelle Garrett, PT MMCPTPB [facility]   1/27/2020 11:00 AM Hoda Beyer CBESKTU [facility]   1/29/2020 10:45 AM Haylee Boykin, SILVERIO GUJRFSW [facility]   1/29/2020 11:30 AM Scout Younger, PTA MMCPTPB [facility]   1/29/2020 12:30 PM Hoda Beyer YROBMPW [facility]   2/10/2020  8:30 AM Miley Parrish MD [address]   2/14/2020 10:15 AM Edger Kanner, MD [facility]

## 2019-12-30 NOTE — PROGRESS NOTES
OT DAILY TREATMENT NOTE 10-18    Patient Name: Katie Mcgarry  Date:2019  : 1955  [x]  Patient  Verified  Payor: VA MEDICARE / Plan: VA MEDICARE PART A & B / Product Type: Medicare /    In time:1100  Out time:1145  Total Treatment Time (min): 45  Visit #: 8 of 12    Medicare/BCBS Only   Total Timed Codes (min):  45 1:1 Treatment Time:  45     Treatment Area: Upper extremity weakness [R29.898]  Cerebral infarction, unspecified [I63.9]    SUBJECTIVE  Pain Level (0-10 scale): 0/10  Any medication changes, allergies to medications, adverse drug reactions, diagnosis change, or new procedure performed?: [x] No    [] Yes (see summary sheet for update)  Subjective functional status/changes:   [] No changes reported  I took my BP meds this morning at 730    OBJECTIVE    15 min Therapeutic Activity:  []  See flow sheet :   Rationale: increase ROM and improve coordination  to improve the patients ability to grasp  Grasp of 1 in dowel and move x 10 with right hand with moderte difficulty  Attempted leat pinch with dowel unable to keep posiiton  Trial of opening drawer-not able to pull but able to place hand in handle  Able to open refrigerator with assist to position last 2 digits      30 min Neuromuscular Re-education:  []  See flow sheet :   Rationale: increase ROM and increase strength  to improve the patients ability to move right arm out of synergy  Right UE  Seated elbow flexion straight plane vs adduciton and hor adduction with max effort achieved 2-3x  Seated elbow flex on elevated table x 10  Seated placed hand on table 10x min diffuclty  Supine able to lift right arm off mat approximately 30 degrees  Supine shoulder abd x 10 about 30 degrees in pillowcase to reduce resistance.       With   [] TE   [] TA   [] neuro   [] other: Patient Education: [x] Review HEP    [] Progressed/Changed HEP based on: practice opening drawers fridge at home  [] positioning   [] body mechanics   [] transfers   [] heat/ice application   [] Splint wear/care   [] Sensory re-education   [] scar management      [] other:            Other Objective/Functional Measures:   Able to lift arm off mat in supine now ( shoulder flex) right     Pain Level (0-10 scale) post treatment: 0/10    ASSESSMENT/Changes in Function: Improving shoulder strength, still lacks volitional release, wrist extension    Patient will continue to benefit from skilled OT services to modify and progress therapeutic interventions, address ROM deficits, address strength deficits, analyze and cue movement patterns and instruct in home and community integration to attain remaining goals. []  See Plan of Care  []  See progress note/recertification  []  See Discharge Summary         Progress towards goals / Updated goals:  *1.  Patient will be able to actively grasp and release light items with right hand to assist with self care tasks. Status at last visit: Able to grasp and extract hand from 1 in dowel 12/27/19, able to complete 10x 12/30/19     2.  Patient will be able to actively flex shoulder to 45 degrees to improve ability to place arm through sleeve. Status at last visit; 30 degrees 12/13/19 progressing 12/16/18, able to keep in flexion in supine for more than one minute 12/18/19, now able to flex elbow off mat 30 degrees in supine 12/30/19     3.  Patient will be able to actively extend wrist to posiiton hand for grasp of items.   Status at last visit-no active extension noted 12/27/19       4.  Patient will report improved ability to use right hand to assist with home care as shown by reduced limitation of at least 20% on Neuro quality of Life    PLAN  []  Upgrade activities as tolerated     [x]  Continue plan of care  []  Update interventions per flow sheet       []  Discharge due to:_  []  Other:_      MALLIKA Velasco/L 12/30/2019  11:55 AM    Future Appointments   Date Time Provider Marc Ruvalcaba   12/31/2019  9:45 AM Jorja Crigler, OTR/L MMCPTPB [facility] 12/31/2019 10:30 AM [place] Furl, PTA MMCPTPB [address]   12/31/2019 11:30 AM Rafael Fonseca I, SLP MMCPTPB [address]   1/2/2020  9:45 AM Myles Re BXCCEGB [address]   1/6/2020  9:45 AM Isidro Blanchard, SLP MMCPTPB [address]   1/6/2020 10:30 AM Mariela Furl, PTA MMCPTPB [address]   1/6/2020 11:00 AM Andres Lan, OTR/L [facility]CPTP[facility] [address]   1/8/2020  9:45 AM Isidro Blanchard, SLP BOSNBVE [address]   1/8/2020 10:30 AM Jordan Sabot, PT MMCPTPB [address]   1/8/2020 11:00 AM Andres Lan, OTR/L [facility] [address]   1/10/2020  9:45 AM Andres Lan, OTR/L MMCPTPB [address]   1/13/2020 10:15 AM Myles Re RLPEDHT [address]   1/13/2020 11:15 AM Rafael Fonseca I, SLP QDURXCR [address]   1/13/2020 12:00 PM Jordan Sabot, PT MMCPTPB [address]   1/15/2020  1:00 PM Mariela Flaherty, PTA [facility]CPTP[facility] [address]   1/15/2020  1:30 PM Rafael Fonseca I, SLP RRAKBMH [address]   1/15/2020  2:30 PM Andres Lan, OTR/L [facility] [address]   1/17/2020  9:00 AM Myles Re KXGSTMA [address]   1/20/2020  9:45 AM Isidro Blanchard, SLP GLUICIV [address]   1/20/2020 10:30 AM Jordan Sabot, PT MMCPTPB [address]   1/20/2020 11:00 AM Myles Re FVAOPAA [address]   1/22/2020 10:15 AM Andres Lan, OTR/L [facility] [address]   1/22/2020 11:00 AM Jordan Sabot, PT [facility]CPTP[facility] [address]   1/22/2020 11:30 AM Rafael Fonseca I, SLP LBENAMG [address]   1/24/2020  1:15 PM Elisadon Riky, OTR/L MMCPTPB [address]   1/27/2020  9:45 AM Rafael Fonseca I, SLP WAMEVUJ [address]   1/27/2020 10:30 AM Jordan Sabot, PT [facility] [address]   1/27/2020 11:00 AM Myles Edmonds ERSQFSV [address]   1/29/2020 10:45 AM Isidro Blanchard, SLP IBBAIYY [address]   1/29/2020 11:30 AM Mariela Flaherty, PTA MMCPTPB [address]   1/29/2020 12:30 PM Myles Edmonds OFZPGCZ [address]   2/10/2020  8:30 AM Armando Damico MD [address]   2/14/2020 10:15 AM Kian Trotter MD [facility] [place]

## 2019-12-30 NOTE — PROGRESS NOTES
ST DAILY TREATMENT NOTE    Patient Name: Jossy Torres  Date:2019  : 1955  [x]  Patient  Verified  Payor: Payor: [place] MEDICARE / Plan: [place] MEDICARE PART A & B / Product Type: Medicare /   In time: 12  Out time: 1030  Total Treatment Time (min): 40  Visit #: 24 of 36     Treatment Diagnosis: Dysarthria and anarthria [R47.1]    SUBJECTIVE  Pain Level (0-10 scale): 0  Any medication changes, allergies to medications, adverse drug reactions, diagnosis change, or new procedure performed?: [x] No    [] Yes (see summary sheet for update)    Subjective functional status/changes:   [x] No changes reported      OBJECTIVE  Treatment provided includes:  Increase/Improve:  []  Voice Quality []  Cognitive Linguistic Skills [x]  Laryngeal/Pharyngeal Exercises   []  Vocal Loudness []  Reading Comprehension [x]  Swallowing Skills    []  Vocal Cord Function []  Auditory Comprehension []  Oral Motor Skills   []  Resonance []  Writing Skills [x]  Compensatory strategies    [x]  Speech Intelligibility []  Expressive Language []  Attention   [x]  Breath Support/Coord.  []  Receptive language []  Memory   []  Articulation []  Safety Awareness []    []  Fluency []  Word Retrieval []        Treatment Provided:  - oropharyngeal strengthening with skilled cueing to increase precision   - po trials with compensatory strategies   - pt education  - vowel prolongations with skilled training to increase breath support/coordniation   - training increase vocal intensity to improve intelligibility   - /z/ and /s/ productions for increased articulatory precision   - review of speech production compensatory strategies     Patient/Caregiver  Education: [x] Review HEP      HEP/Handouts given: continue HEP    Pain Level (0-10 scale) post treatment: 0    ASSESSMENT   Carry over of improved vocal intensity with mod cues in conversation   []   Improving appropriately and progressing toward goals  [x]   Improving slowly and progressing toward goals  []   Approximating goals/maximum potential  [x]   Continues to benefit from skilled therapy to address remaining functional deficits  []   Not progressing toward goals and plan of care will be adjusted    Patient will continue to benefit from skilled therapy to address remaining functional deficits: dysphagia, dysarthria     Progress towards goals / Updated goals:  1. The pt will increase quality and length of phonation by sustaining ah utilizing effective diaphragmatic breath support for >15 seconds when provided with min-modA in 3/3 sessions to increase functional speech intelligibility. 19 average phonation duration 11.3 seconds range 8-14 seconds with min-mod A for cuing/shaping/breath support across 6 trials.      2. The patient will articulate (s/z) consonants at the word level- phrases level with 90% acc with use of comp strategies and OMES  to improve speech intelligibility to > 90% acc when provided with min cues. 19 at the phrase level: medial /z/ 75% accuracy Michael, final /z/ 85% accuracy with Michael. Medial /s/ 85% accuracy with Michael      3. The patient will articulate  (l, voiced /th/ consonants at the sentence to conversational level  with 90% acc with use of comp strategies and OMES  to improve speech intelligibility to > 90% acc when provided with min cues.   19 not addressed this date      4.  Pt will demonstrate adequate vocal intensity in varying levels from whisper to yelling with 85% accuracy in structured tasks at the sentence to conversational Jacky Leghorn provided with min cues.   19 gradually progressin% accuracy with Michael -faded cueing for sentences      5. Patient will complete tongue base retraction,and laryngeal/pharyngeal strengthening exercises (including Sheyla maneuver, Mendelsohn maneuver, modified Shaker with CTAR ball, hard /k/ in isolation,  effortful swallow, etc.), with min cues in 5/5 sessions in order to improve the strength/agility/efficiency of his swallow for improved swallowing safety and QOL.   12/30/19 progressing: extended yawn x10 with Michael, modified shaker with CTAR ball x30 5 sec holds min A, tongue pull backs hold 5 secs to increase TBR with min A x25, mendelsohn x10 x2 sets with min-modA     6. Patient will recall/demonstrate aspiration precautions and safe swallowing strategies with 100% acc when provided with occasional cues in 5/5 sessions (small sips/bites; chin tuck with all liquid intake; alternate liquids/solids; slow rate; NO straws, Sit upright at 90 degree angle during PO trials)  to decrease the reported incidences of dysphagia and s/sx of aspiration.   12/30/19 progressing: with Michael during po trials. no overt s/sx of aspiration observed - cannot rule out silent aspiration    PLAN  [x]  Continue plan of care  []  Modify Goals/Treatment Plan      []  Discharge due to:  [] Other:    SILVERIO Siu 12/30/2019  9:48 AM    Future Appointments   Date Time Provider Marc Ruvalcaba   12/30/2019 10:30 AM Xena Schmidt PT [facility]   12/30/2019 11:00 AM Kimmy Ward, OTR/L [facility]   12/31/2019  9:45 AM Kimmy Ward, OTR/L [facility]   12/31/2019 10:30 AM Taisha Harris PTA [facility]   12/31/2019 11:30 AM SILVERIO Crain [facility]   1/2/2020  9:45 AM Lorrie WHYTE [facility]   1/6/2020  9:45 AM SILVERIO Robles [facility]   1/6/2020 10:30 AM Taisha Harris PTA [facility]   1/6/2020 11:00 AM Kimmy Ward, OTR/L [facility]   1/8/2020  9:45 AM Martin Dunn, SLP [facility]   1/8/2020 10:30 AM Sandra Kennedy, PT [facility]   1/8/2020 11:00 AM Bettyjo , OTR/L [facility]   1/10/2020  9:45 AM Bettyjo , OTR/L [facility]   1/13/2020 10:15 AM Lorrie LIZARRAGAXLALETHEA [facility]   1/13/2020 11:15 AM Akil Castrejon I, SLP [facility]   1/13/2020 12:00 PM Sandra Kennedy, PT [facility]   1/15/2020  1:00 PM Taisha Harris, PTA [facility]   1/15/2020  1:30 PM Mckinley Dickson Miley Fabian, SLP [facility]   1/15/2020  2:30 PM Agus Goodman, OTR/L [facility]   1/17/2020  9:00 AM Maryjane Orellana XNULQPW [facility]   1/20/2020  9:45 AM Sandro Perez, SILVERIO IYAPNPD [facility]   1/20/2020 10:30 AM Edda Salvador, PT [facility]   1/20/2020 11:00 AM Maryjane Orellana GURMGZV [facility]   1/22/2020 10:15 AM Agus Goodman, OTR/L [facility]   1/22/2020 11:00 AM Edda Salvador, PT [facility]   1/22/2020 11:30 AM Zabrina Skinner I, SLP CFNQELV [facility]   1/24/2020  1:15 PM Agus Goodman, OTR/L [facility]   1/27/2020  9:45 AM Zabrina Skinner I, SLP AHEWNHO [facility]   1/27/2020 10:30 AM Edda Salvador, PT [facility]   1/27/2020 11:00 AM Maryjane Orellana WAQYYLU [facility]   1/29/2020 10:45 AM Sandro Perez, SILVERIO ZOKOKUD [facility]   1/29/2020 11:30 AM Berna Olson, LINDA [facility]   1/29/2020 12:30 PM Maryjane Orellana OHYSHTT [facility]   2/10/2020  8:30 AM Nathanael Byrd MD [address]   2/14/2020 10:15 AM Madison Sahni MD [facility] Brian Barrientos

## 2019-12-31 ENCOUNTER — DOCUMENTATION ONLY (OUTPATIENT)
Dept: NEUROLOGY | Age: 64
End: 2019-12-31

## 2019-12-31 ENCOUNTER — HOSPITAL ENCOUNTER (OUTPATIENT)
Dept: PHYSICAL THERAPY | Age: 64
Discharge: HOME | End: 2019-12-31
Payer: MEDICARE

## 2019-12-31 PROCEDURE — 97110 THERAPEUTIC EXERCISES: CPT

## 2019-12-31 PROCEDURE — 97112 NEUROMUSCULAR REEDUCATION: CPT

## 2019-12-31 PROCEDURE — 92507 TX SP LANG VOICE COMM INDIV: CPT

## 2019-12-31 PROCEDURE — 92526 ORAL FUNCTION THERAPY: CPT

## 2019-12-31 PROCEDURE — 97530 THERAPEUTIC ACTIVITIES: CPT

## 2019-12-31 NOTE — TELEPHONE ENCOUNTER
Patient called returning nurses called regarding CPAP machine.  He will be back home after 1pm. [phone] Please advise

## 2019-12-31 NOTE — PROGRESS NOTES
PT DAILY TREATMENT NOTE 10-18    Patient Name: Baylee Purchase  Date:2019  : 1955  [x]  Patient  Verified  Payor: [place] MEDICARE / Plan: VA MEDICARE PART A & B / Product Type: Medicare /    In time:10:30  Out time:11:05  Total Treatment Time (min): 35  Visit #: 3 of 12    Medicare/BCBS Only   Total Timed Codes (min):  35 1:1 Treatment Time:  30       Treatment Area: Lower extremity weakness [R29.898]  Cerebral infarction, unspecified [I63.9]    SUBJECTIVE  Pain Level (0-10 scale): 0/10  Any medication changes, allergies to medications, adverse drug reactions, diagnosis change, or new procedure performed?: [x] No    [] Yes (see summary sheet for update)  Subjective functional status/changes:   [] No changes reported  Pt reports improvement in his walking since starting a new focus in therapy. He would like to be able to walk with a SPC eventually. OBJECTIVE    35 min Neuromuscular Re-education:  [x]  See flow sheet :   Rationale: increase ROM, increase strength, improve coordination, improve balance and increase proprioception  to improve the patients ability to improve gait pattern for improved ease/safety with ambulation         With   [] TE   [] TA   [] neuro   [] other: Patient Education: [x] Review HEP    [] Progressed/Changed HEP based on:   [] positioning   [] body mechanics   [] transfers   [] heat/ice application    [x] other: instructed pt to monitor BP and call MD if BP remained high.   Reviewed signs/sx of heart attack and stroke and instructed pt to go to emergency room immediately if he ever experienced signs/sx       Other Objective/Functional Measures:     /71, heart rate 64bpm taken electronically    Initiated session in side lying for facilitated swing phase of gait to encourage pelvic depression with knee flexion  Carry over to standing in //  Pt did best with tactile cue under knee with knee flexed to press into hip depression to simulate the correct pattern with swing phase of gait  Pt was able to correct independently with initial cueing to prevent pelvic elevation after initial reps  Struggled with HS strength against gravity to flex knee with hip extended but also performed this after already working in side lying  Pt much improved with soft knee at midstance but returned to locking knee with fatigue    Pain Level (0-10 scale) post treatment: 0/10    ASSESSMENT/Changes in Function:     Pt is progressing with reducing pelvic elevation for swing phase in an upright position to help with carryover to gait. He struggles with decreased HS strength needed for initial swing for foot clearance. He has better awareness of preventing use of knee extension locked for midstance stability but reverts to his old pattern with fatigue. Will continue to stress repetition of correct movement patterns with carryover to gait for ease of ambulation with LRAD and decreased fall risk. Progress towards goals / Updated goals:  1. Pt will ambulate 4 steps x 3 with 1 HR with supervision to be able to navigate stairs at home.   2. Pt will increase FOTO score by 7 points in order to demonstrate improved functional ability   3. Pt will increase right hamstring strength to 2+/5 in order to improve right foot clearance and normalize gait pattern for increased ambulatory tolerance and reduced fall risk. 4. Pt will demonstrate Romberg EC on a compliant surface for 15 seconds without LOB in order to demonstrate improved stability in poorly lit environments.    5. Pt will ambulate >150' with SPC and no evidence of instability in order to improve ease of household negotiation with LRAD.     PLAN  [x]  Upgrade activities as tolerated     [x]  Continue plan of care  []  Update interventions per flow sheet       []  Discharge due to:_  []  Other:_      Marilu Oscar PTA 12/31/2019  10:32 AM    Future Appointments   Date Time Provider Marc Ruvalcaba   1/2/2020  9:45 AM Francisco Hernandez DSVCJBI [facility]   1/6/2020 9:45 AM Britni Lopez, SILVERIO [facility]   1/6/2020 10:30 AM Jeremy Muñoz, PTA [facility]   1/6/2020 11:00 AM David Lopez, OTR/L [facility]   1/8/2020  9:45 AM Britni Lopez, SILVERIO JBAWGDF [facility]   1/8/2020 10:30 AM Mert Peoples, PT [facility]   1/8/2020 11:00 AM David Lopez, OTR/L [facility]   1/10/2020  9:45 AM David Lopez, OTR/L [facility]   1/13/2020 10:15 AM Gulshan Fields HOOZMOE [facility]   1/13/2020 11:15 AM Zoe Solis, SLP VROELNL [facility]   1/13/2020 12:00 PM Mert Peoples, PT [facility]   1/15/2020  1:00 PM Jeremy Muñoz, PTA [facility]   1/15/2020  1:30 PM Zoe Solis, SLP JHCQCKD [facility]   1/15/2020  2:30 PM David Lopez, OTR/L [facility]   1/17/2020  9:00 AM Kemptonelke Fields MOBOMTK [facility]   1/20/2020  9:45 AM Zoe Conner I, SLP UMLKVIZ [facility]   1/20/2020 10:30 AM Mert Peoples, PT [facility]   1/20/2020 11:00 AM Gulshan Fields RXBAXKC [facility]   1/22/2020 10:15 AM David Lopez, OTR/L [facility]   1/22/2020 11:00 AM Mert Peoples, PT [facility]   1/22/2020 11:30 AM Zoe Mac I, SLP ZOZYEJQ [facility]   1/24/2020  1:15 PM David Lopez, OTR/L [facility]   1/27/2020  9:45 AM Zoe Mac I, SLP FEHEYJH [facility]   1/27/2020 10:30 AM Mert Peoples, PT [facility]   1/27/2020 11:00 AM Gulshan Fields MUHGFTJ [facility]   1/29/2020 10:45 AM Britni Lopez, SLP XZLLFAR [facility]   1/29/2020 11:30 AM Jeremy Muñoz, PTA [facility]   1/29/2020 12:30 PM Gulshan MATTHEWSTDSUKHJINDER [facility]   2/10/2020  8:30 AM Millie Sloan  E Roxi    2/14/2020 10:15 AM Anya Guthrie MD [facility] Walker Meaghan

## 2019-12-31 NOTE — PROGRESS NOTES
ST DAILY TREATMENT NOTE    Patient Name: Cristiane Gresham  Date:2019  : 1955  [x]  Patient  Verified  Payor: Payor: [place] MEDICARE / Plan: [place] MEDICARE PART A & B / Product Type: Medicare /   In time: 11:35  Out time: 12:13  Total Treatment Time (min): 40  Visit #: 25 of 36    Treatment Diagnosis: Dysarthria and anarthria [R47.1] oropharyngeal dysphagia [R13.12]    SUBJECTIVE  Pain Level (0-10 scale): 0  Any medication changes, allergies to medications, adverse drug reactions, diagnosis change, or new procedure performed?: [x] No    [] Yes (see summary sheet for update)    Subjective functional status/changes:   [x] No changes reported       OBJECTIVE  Treatment provided includes:  Increase/Improve:  []  Voice Quality []  Cognitive Linguistic Skills [x]  Laryngeal/Pharyngeal Exercises   [x]  Vocal Loudness []  Reading Comprehension [x]  Swallowing Skills    []  Vocal Cord Function []  Auditory Comprehension []  Oral Motor Skills   []  Resonance []  Writing Skills [x]  Compensatory strategies    [x]  Speech Intelligibility []  Expressive Language []  Attention   []  Breath Support/Coord. []  Receptive language []  Memory   []  Articulation []  Safety Awareness []    []  Fluency []  Word Retrieval []        Treatment Provided:  - oropharyngeal exercises for strengthening and endurance with skilled cueing to increase accuracy and precision   - po trials utilizing compensatory strategies to increase safety during meals and po intake   - sentences using increase intensity and compensatory strategies to increase speech intelligibility   - productions of /th, l, s, z/ in phrases and sentences with skilled cueing to increase articulatory precision for intelligibility     Patient/Caregiver  Education: [x] Review HEP      HEP/Handouts given: continue home exercise program     Pain Level (0-10 scale) post treatment: 0    ASSESSMENT   Progressing towards STGs addressing dysphagia and dysarthria.  It is medically necessary to continue ST treatment to improve safety, efficiency of swallow, and speech intelligibility   []   Improving appropriately and progressing toward goals  [x]   Improving slowly and progressing toward goals  []   Approximating goals/maximum potential  [x]   Continues to benefit from skilled therapy to address remaining functional deficits  []   Not progressing toward goals and plan of care will be adjusted    Patient will continue to benefit from skilled therapy to address remaining functional deficits: dysphagia, dysarthria     Progress towards goals / Updated goals:  1. The pt will increase quality and length of phonation by sustaining ah utilizing effective diaphragmatic breath support for >15 seconds when provided with min-modA in 3/3 sessions to increase functional speech intelligibility. 19 not addressed this date      2. The patient will articulate (s/z) consonants at the word level- phrases level with 90% acc with use of comp strategies and OMES  to improve speech intelligibility to > 90% acc when provided with min cues. 19  at the phrase level: /z/ with 80% accuracy with min-modA/modeling ; /s/ with 85% accuracy with mod fading to Michael - increased self correction      3. The patient will articulate  (l, voiced /th/ consonants at the sentence to conversational level  with 90% acc with use of compensatory strategies and OMES  to improve speech intelligibility to > 90% acc when provided with min cues. 19: /th/ voiced in sentences with models provided with 90% accuracy in initial position.      4.  Pt will demonstrate adequate vocal intensity in varying levels from whisper to yelling with 85% accuracy in structured tasks at the sentence to conversational Todd Righter provided with min cues.   19  gradually progressin% accuracy with Michael in sentences with modeling provided.  ModA at the conversational level      5. Patient will complete tongue base retraction,and laryngeal/pharyngeal strengthening exercises (including Sheyla maneuver, Mendelsohn maneuver, modified Shaker with CTAR ball, hard /k/ in isolation,  effortful swallow, etc.), with min cues in 5/5 sessions in order to improve the strength/agility/efficiency of his swallow for improved swallowing safety and QOL.   12/31/19  progressing: modified shaker extended hold x6 , rapid reps x30 with Michael,      6. Patient will recall/demonstrate aspiration precautions and safe swallowing strategies with 100% acc when provided with occasional cues in 5/5 sessions (small sips/bites; chin tuck with all liquid intake; alternate liquids/solids; slow rate; NO straws, Sit upright at 90 degree angle during PO trials)  to decrease the reported incidences of dysphagia and s/sx of aspiration.   12/31/19 progressing: with Michael during po trials of thin liquids. no overt s/sx of aspiration observed - cannot rule out silent aspiration    PLAN   [x]  Continue plan of care  []  Modify Goals/Treatment Plan      []  Discharge due to:  [] Other:    SILVERIO Nixon 12/31/2019  8:57 AM    Future Appointments   Date Time Provider Marc Ruvalcaba   12/31/2019  9:45 AM Nisreen Desai, OTR/L [facility]   12/31/2019 10:30 AM Mami Screen, PTA [facility]   12/31/2019 11:30 AM SILVERIO Puente [facility]   1/2/2020  9:45 AM Linette JIANG [facility]   1/6/2020  9:45 AM SILVERIO Cooper [facility]   1/6/2020 10:30 AM Mami Screen, PTA [facility]   1/6/2020 11:00 AM Nisreen Desai OTR/L [facility]   1/8/2020  9:45 AM SILVERIO Cooper [facility]   1/8/2020 10:30 AM Bartolo Prasad, PT [facility]   1/8/2020 11:00 AM Nisreen Desai, OTR/L [facility]   1/10/2020  9:45 AM Nisreen Desai, OTR/L [facility]   1/13/2020 10:15 AM Linette AWAD [facility]   1/13/2020 11:15 AM Boston Rosenberg I, SLP GJOSCUF [facility]   1/13/2020 12:00 PM Bartolo Prasad, PT [facility]   1/15/2020  1:00 PM Mami Thompson, PTA [facility]   1/15/2020 1:30 PM SILVERIO Dillon IOUKUYY [facility]   1/15/2020  2:30 PM Tunde Dunbar, OTR/L [facility]   1/17/2020  9:00 AM Lisbet Rincon JNLUYPU [facility]   1/20/2020  9:45 AM SILVERIO Dillon TURNNOP [facility]   1/20/2020 10:30 AM Emir Wyatt, PT [facility]   1/20/2020 11:00 AM Lisbet Rincon IHPKBWA [facility]   1/22/2020 10:15 AM Tunde Dunbar, OTR/L [facility]   1/22/2020 11:00 AM Emir Wyatt, PT [facility]   1/22/2020 11:30 AM SILVERIO Riddle EHYZGTJ [facility]   1/24/2020  1:15 PM Tunde Dunbar, OTR/L [facility]   1/27/2020  9:45 AM SILVERIO Riddle CJPINCU [facility]   1/27/2020 10:30 AM Emir Wyatt, PT [facility]   1/27/2020 11:00 AM Lisbet Rincon NJHXVKK [facility]   1/29/2020 10:45 AM SILVERIO Dillon NSXVBEI [facility]   1/29/2020 11:30 AM Mark Lockwood, PTA [facility]   1/29/2020 12:30 PM Lisbet Rincon UHRQIQN [facility]   2/10/2020  8:30 AM Khloe Taveras MD [address]   2/14/2020 10:15 AM Noel Lee MD [facility]

## 2019-12-31 NOTE — PROGRESS NOTES
Patient called back, Was informed that Dr. Delmi Beasley was out of the office and will not be back until the 6 th of January. He said his machine was not working well and worn out. Last machine was 2003 . I will ask Ms. Earnstine Klinefelter if she would ask Dr. Delmi Beasley to put in a order for the machine and heated humidifier .  Patient understood

## 2019-12-31 NOTE — PROGRESS NOTES
OT DAILY TREATMENT NOTE 10-18    Patient Name: Laurie Edmondson  Date:2019  : 1955  [x]  Patient  Verified  Payor: [place] MEDICARE / Plan: [place] MEDICARE PART A & B / Product Type: Medicare /    In time:945  Out time:1030  Total Treatment Time (min): 45  Visit #: 9 of 12    Medicare/BCBS Only   Total Timed Codes (min):  45 1:1 Treatment Time:  45     Treatment Area: Upper extremity weakness [R29.898]  Cerebral infarction, unspecified [I63.9]    SUBJECTIVE  Pain Level (0-10 scale): 0/10  Any medication changes, allergies to medications, adverse drug reactions, diagnosis change, or new procedure performed?: [x] No    [] Yes (see summary sheet for update)  Subjective functional status/changes:   [] No changes reported  I don't do my exercises on the days I come here    OBJECTIVE      15 min Therapeutic Exercise:  [] See flow sheet :   Rationale: increase ROM, increase strength and improve coordination to improve the patients ability to move arm volitionally  Knocked 10 balls off Sa[place]o tree levels 1,2 using right arm moving in hor abduciton with elbow and shoulder flexion. Table positioned 4 inches above lower level. PROM, wrist and digits right    15 min Therapeutic Activity:  []  See flow sheet :   Rationale: increase ROM and improve coordination  to improve the patients ability to grasp  1 inch dowels remove replace x 3 today with increased digit flexion limiting success  Able to  from board with 3 point pinch x 1       15 min Neuromuscular Re-education:  []  See flow sheet :   Rationale: increase ROM, increase strength and improve coordination  to improve the patients ability to move right arm  Seated quick stretch sup pro with motion to mid range.     Attempted wrist ext with quick stretch with min/no response  Supine shoulder abd to 20 degrees right x 10  Supine place and hold shoulder flexion x 30 seconds, then small motions controlled for hor ab add x 16 reps      With   [] TE   [] TA   [] neuro   [] other: Patient Education: [x] Review HEP    [] Progressed/Changed HEP based on:  Sup pro at home  [] positioning   [] body mechanics   [] transfers   [] heat/ice application   [] Splint wear/care   [] Sensory re-education   [] scar management      [] other:            Other Objective/Functional Measures:   Able to actively supinate today to mid range right     Pain Level (0-10 scale) post treatment: 0/10    ASSESSMENT/Changes in Function: slow improvement in shoulder control, biceps    Patient will continue to benefit from skilled OT services to modify and progress therapeutic interventions, address ROM deficits, address strength deficits, analyze and cue movement patterns and instruct in home and community integration to attain remaining goals. []  See Plan of Care  []  See progress note/recertification  []  See Discharge Summary         Progress towards goals / Updated goals:  *1.  Patient will be able to actively grasp and release light items with right hand to assist with self care tasks. Status at last visit: Able to grasp and extract hand from 1 in dowel 12/27/19, able to complete 10x 12/30/19     2.  Patient will be able to actively flex shoulder to 45 degrees to improve ability to place arm through sleeve. Status at last visit; 30 degrees 12/13/19 progressing 12/16/18, able to keep in flexion in supine for more than one minute 12/18/19, now able to flex elbow off mat 30 degrees in supine 12/30/19, able to knock balls off [place] tree using active motion right UE 12/31/19     3.  Patient will be able to actively extend wrist to posiiton hand for grasp of items.   Status at last visit-no active extension noted 12/27/19       4.  Patient will report improved ability to use right hand to assist with home care as shown by reduced limitation of at least 20% on Neuro quality of Life**    PLAN  []  Upgrade activities as tolerated     []  Continue plan of care  []  Update interventions per flow sheet []  Discharge due to:_  []  Other:_      Kavin Johnson, OTR/L 12/31/2019  8:13 AM    Future Appointments   Date Time Provider Marc Ruvalcaba   12/31/2019  9:45 AM Marvel Rosales, OTR/L MMCPTPB [facility]   12/31/2019 10:30 AM Donna Patient, PTA MMCPTPB [facility]   12/31/2019 11:30 AM Milena Zabala I, SLP [facility]CPTPB [facility]   1/2/2020  9:45 AM Fredi Holland WHCQCCJ [facility]   1/6/2020  9:45 AM Rena Rosario, SILVERIO XYAQOHF [facility]   1/6/2020 10:30 AM Donna Patient, PTA MMCPTPB [facility]   1/6/2020 11:00 AM Marvel Rosales, OTR/L MMCPTPB [facility]   1/8/2020  9:45 AM Rena Rosario, SILVERIO UGHHGAM [facility]   1/8/2020 10:30 AM Randal Quinonez, PT MMCPTPB [facility]   1/8/2020 11:00 AM Marvel Rosales, OTR/L MMCPTPB [facility]   1/10/2020  9:45 AM Marvel Rosales, OTR/L MMCPTPB [facility]   1/13/2020 10:15 AM Fredi Holland TLAUGGE [facility]   1/13/2020 11:15 AM Milena Zabala I, SLP MFGMERU [facility]   1/13/2020 12:00 PM Amelia Bang, PT MMCPTPB [facility]   1/15/2020  1:00 PM Donna Patient, PTA MMCPTPB [facility]   1/15/2020  1:30 PM Milena Zabala I, SLP [id number] [facility]   1/15/2020  2:30 PM Marvel Rosales, OTR/L MMCPTPB [facility]   1/17/2020  9:00 AM Fredi Holland OCOAVDJ [facility]   1/20/2020  9:45 AM Rena Rosario, SILVERIO ASUEBUA [facility]   1/20/2020 10:30 AM Randal Quinonez, PT MMCPTPB [facility]   1/20/2020 11:00 AM Fredi Holland WZBNFGU [facility]   1/22/2020 10:15 AM Marvel Rosales, OTR/L MMCPTPB [facility]   1/22/2020 11:00 AM Randal Bang, PT [facility]CPTPB [facility]   1/22/2020 11:30 AM Milena Zabala I, SLP MMCPTPB [facility]   1/24/2020  1:15 PM Marvel Rosales, OTR/L [facility]CPTPB [facility]   1/27/2020  9:45 AM Milena Zabala I, SLP EKACDZF [facility]   1/27/2020 10:30 AM Randal Bang, PT [facility]CPTPB [facility]   1/27/2020 11:00 AM Fredi Holland NGJEOOT [facility]   1/29/2020 10:45 AM Rena Rosario, SLP CZDCBVN [facility]   1/29/2020 11:30 AM Donna Billingsley, PTA MMCPTPB [facility]   1/29/2020 12:30 PM Fredi Holland COITQZP [facility]   2/10/2020  8:30 AM [place],  E Roxi Whitfield   2/14/2020 10:15 AM Christina Bello MD [facility]

## 2020-01-02 ENCOUNTER — APPOINTMENT (OUTPATIENT)
Dept: PHYSICAL THERAPY | Age: 65
End: 2020-01-02
Payer: MEDICARE

## 2020-01-02 ENCOUNTER — HOSPITAL ENCOUNTER (OUTPATIENT)
Dept: PHYSICAL THERAPY | Age: 65
Discharge: HOME | End: 2020-01-02
Payer: MEDICARE

## 2020-01-02 PROCEDURE — 97112 NEUROMUSCULAR REEDUCATION: CPT

## 2020-01-02 PROCEDURE — 97110 THERAPEUTIC EXERCISES: CPT

## 2020-01-02 PROCEDURE — 97530 THERAPEUTIC ACTIVITIES: CPT

## 2020-01-02 NOTE — PROGRESS NOTES
OT DAILY TREATMENT NOTE 10-18    Patient Name: Joanna Zambrano  Date:2020  : 1955  [x]  Patient  Verified  Payor: VA MEDICARE / Plan: VA MEDICARE PART A & B / Product Type: Medicare /    In time:945  Out time:1025  Total Treatment Time (min): 40  Visit #: 10 of 12    Medicare/BCBS Only   Total Timed Codes (min):  40 1:1 Treatment Time:  40     Treatment Area: Upper extremity weakness [R29.898]  Cerebral infarction, unspecified [I63.9]    SUBJECTIVE  Pain Level (0-10 scale): 0/10  Any medication changes, allergies to medications, adverse drug reactions, diagnosis change, or new procedure performed?: [x] No    [] Yes (see summary sheet for update)  Subjective functional status/changes:   [] No changes reported  \"my goals are just to keep working on my hand. More therapy on my hand. \"  \"I need it to come back. I am almost out of time\"   \"They cut back my Speech and PT to two times a week. I am glad that my OT wasn't cut back. \"      OBJECTIVE  10 min Therapeutic Exercise:  [] See flow sheet :   Rationale: increase ROM and increase strength to improve the patients ability to reach, functionally use Right UE    Shrugs/Retraction  PROM Wrist  PROM Digits    20 min Neuromuscular Re-education:  []  See flow sheet :   Rationale: increase ROM, increase strength and improve coordination  to improve the patients ability to move right arm     Recheck: Goals  Recip shoulder flexion with resistance 2x10  Knee to knee with min Difficulty   Seated quick stretch sup pro with motion to mid range.     Attempted wrist ext with quick stretch with min/no response      10 min Therapeutic Activity:  []  See flow sheet :   Rationale: increase ROM and improve coordination  to improve the patients ability to move Right Arm    Recheck FOTO, Revisit Goals/New Goals     With   [] TE   [] TA   [] neuro   [] other: Patient Education: [x] Review HEP    [] Progressed/Changed HEP based on:   [] positioning   [] body mechanics   [] transfers   [] heat/ice application   [] Splint wear/care   [] Sensory re-education   [] scar management      [] other:            Other Objective/Functional Measures:     Neuro Limitation: 58% (82%)     Pain Level (0-10 scale) post treatment: 0/10    ASSESSMENT/Changes in Function: unable to elicit response for active wrist extension     Patient will continue to benefit from skilled OT services to modify and progress therapeutic interventions, address ROM deficits, address strength deficits, analyze and cue movement patterns and instruct in home and community integration to attain remaining goals. []  See Plan of Care  [x]  See progress note/recertification  []  See Discharge Summary         Progress towards goals / Updated goals:  1.  Patient will be able to actively grasp and release light items with right hand to assist with self care tasks. Status at last visit: Able to grasp and extract hand from 1 in dowel 12/27/19, able to complete 10x 12/30/19     2.  Patient will be able to actively flex shoulder to 45 degrees to improve ability to place arm through sleeve. Status at last visit; 30 degrees 12/13/19 progressing 12/16/18, able to keep in flexion in supine for more than one minute 12/18/19, now able to flex elbow off mat 30 degrees in supine 12/30/19, able to knock balls off [place] tree using active motion right UE 12/31/19      3.  Patient will be able to actively extend wrist to posiiton hand for grasp of items.   Status at last visit-no active extension noted  1/2/20     4.  Patient will report improved ability to use right hand to assist with home care as shown by reduced limitation of at least 20% on Neuro quality of Life Goal Met 1/2/20    PLAN  []  Upgrade activities as tolerated     [x]  Continue plan of care  []  Update interventions per flow sheet       []  Discharge due to:_  []  Other:_      DARELL Medrano 1/2/2020  8:34 AM    Future Appointments   Date Time Provider Department [place]   1/6/2020  9:45 AM Tete Louis, SLP [facility]CPTPB [facility]   1/6/2020 10:30 AM Jesi Reyes, PTA [facility]CPTPB [facility]   1/6/2020 11:00 AM Merton Kanner, OTR/L [facility]CPTPB [facility]   1/8/2020  9:45 AM Tete Louis, SLP [id number] [facility]   1/8/2020 10:30 AM Jamal Luna, PT [facility]CPTPB [facility]   1/8/2020 11:00 AM Merton Kanner, OTR/L [facility]CPTPB [facility]   1/10/2020  9:45 AM Merton Kanner, OTR/L [facility]CPTPB [facility]   1/13/2020 10:15 AM Raoul Gillette QDASBEZ [facility]   1/13/2020 11:15 AM Xiang Craneet I, SLP YVHUBWE [facility]   1/13/2020 12:00 PM Jamal Luna, PT [facility]CPTPB [facility]   1/15/2020  1:00 PM Jesi Reyes, PTA [facility]CPTPB [facility]   1/15/2020  1:30 PM Xiang [place] I, SLP XEGQFAG [facility]   1/15/2020  2:30 PM Merton Kanner, OTR/L MMCPTPB [facility]   1/17/2020  9:00 AM Raoul Gillette UVYTEBD [facility]   1/20/2020  9:45 AM Xiang [place] I, SLP GVQIOSE [facility]   1/20/2020 10:30 AM Jamal Luna, PT MMCPTPB [facility]   1/20/2020 11:00 AM Raoul Gillette MHLYJGP [facility]   1/22/2020 10:15 AM Merton Kanner, OTR/L MMCPTPB [facility]   1/22/2020 11:00 AM Jamal Luna, PT [facility]CPTPB [facility]   1/22/2020 11:30 AM SILVERIO Grissom HXACYRE [facility]   1/24/2020  1:15 PM Merton Kanner, OTR/L MMCPTPB [facility]   1/27/2020  9:45 AM SILVERIO Grissom WHYILES [facility]   1/27/2020 10:30 AM Jamal Luna, PT [facility]CPTPB [facility]   1/27/2020 11:00 AM Raoul Gillette ZVFQECD [facility]   1/29/2020 10:45 AM SILVERIO Go [id number] [facility]   1/29/2020 11:30 AM Jesi Reyes PTA MMCPTPB [facility]   1/29/2020 12:30 PM Raoul Gillette KHIHCRR [facility]   2/10/2020  8:30 AM MD Thang Yan E Hunt    2/14/2020 10:15 AM Reyna Marie MD [facility] Bladimir Guido

## 2020-01-06 ENCOUNTER — HOSPITAL ENCOUNTER (OUTPATIENT)
Dept: PHYSICAL THERAPY | Age: 65
Discharge: HOME | End: 2020-01-06
Payer: MEDICARE

## 2020-01-06 ENCOUNTER — APPOINTMENT (OUTPATIENT)
Dept: PHYSICAL THERAPY | Age: 65
End: 2020-01-06
Payer: MEDICARE

## 2020-01-06 PROCEDURE — 92507 TX SP LANG VOICE COMM INDIV: CPT

## 2020-01-06 PROCEDURE — 97116 GAIT TRAINING THERAPY: CPT

## 2020-01-06 PROCEDURE — 97112 NEUROMUSCULAR REEDUCATION: CPT

## 2020-01-06 PROCEDURE — 97110 THERAPEUTIC EXERCISES: CPT

## 2020-01-06 PROCEDURE — 97530 THERAPEUTIC ACTIVITIES: CPT

## 2020-01-06 PROCEDURE — 92526 ORAL FUNCTION THERAPY: CPT

## 2020-01-06 NOTE — TELEPHONE ENCOUNTER
Pt called and he stated that he needs this new machine order and the humidifier. He is unable to sleep and he said he's been trying to get this machine for a couple of weeks now. Pt asked to please have Dr. Nesha Nye sign the order form from [facility] and fax it back to [facility]. Pt is requesting a call back from Dr. Nesha Nye or nurse once request has been completed. Thank you.

## 2020-01-06 NOTE — PROGRESS NOTES
OT DAILY TREATMENT NOTE 10-18    Patient Name: Neal Delgadillo  Date:2020  : 1955  [x]  Patient  Verified  Payor: VA MEDICARE / Plan: VA MEDICARE PART A & B / Product Type: Medicare /    In time:1100  Out time:1145  Total Treatment Time (min): 45  Visit #: 1 of 12    Medicare/BCBS Only   Total Timed Codes (min):  45 1:1 Treatment Time:  45     Treatment Area: Upper extremity weakness [R29.898]  Cerebral infarction, unspecified [I63.9]    SUBJECTIVE  Pain Level (0-10 scale): 0/10  Any medication changes, allergies to medications, adverse drug reactions, diagnosis change, or new procedure performed?: [x] No    [] Yes (see summary sheet for update)  Subjective functional status/changes:   [] No changes reported  Doing e stim 2x day  Still can't make my wrist move by itself    OBJECTIVE          15 min Therapeutic Activity:  []  See flow sheet :   Rationale: increase ROM and improve coordination  to improve the patients ability to grasp  Grasp and move 1 inch dowels x 10 right hand  Right hand grasp of small sock ball x 2      30 min Neuromuscular Re-education:  []  See flow sheet :   Rationale: increase ROM and increase strength  to improve the patients ability to move right UE freely  Supine place and hold right UE then hor ab adduciton x 2-3 trials before loss of control  Small circles in air x 3 right UE  Seated elbow flex ext x 10, recip pro sup with quick stretch provided right UE  Seated quick stretch trial and tapping for wirst with trace/no result  Attempted beach ball hold in supine with both hands, unable      With   [] TE   [] TA   [] neuro   [] other: Patient Education: [x] Review HEP    [] Progressed/Changed HEP based on:   [] positioning   [] body mechanics   [] transfers   [] heat/ice application   [] Splint wear/care   [] Sensory re-education   [] scar management      [] other:            Other Objective/Functional Measures:   No response to quick stretch for wrist     Pain Level (0-10 scale) post treatment: 0/10    ASSESSMENT/Changes in Function: improving biceps recruitment    Patient will continue to benefit from skilled OT services to modify and progress therapeutic interventions to attain remaining goals. []  See Plan of Care  []  See progress note/recertification  []  See Discharge Summary         Progress towards goals / Updated goals:  1.   Patient will be able to actively grasp and release light items ith right hand to assist with self care tasks  Status at last visit: Able to grasp sock ball today 1/6/20  2.  Patient will be able to actively flex shoulder to 45 degrees to improve ability to place arm through sleeve. 3.  Patient will be able to actively extend wrist to posiiton hand for grasp of items. New goal:   4.  .  Patient will robert able to actively abduct shoulder to put on deodorant.   Status last visit: Palpable middlle deltoid with poor strength 1/6/20         PLAN  []  Upgrade activities as tolerated     [x]  Continue plan of care  []  Update interventions per flow sheet       []  Discharge due to:_  []  Other:_      Carrie Cote, OTR/L 1/6/2020  1:04 PM    Future Appointments   Date Time Provider Marc Ruvalcaba   1/8/2020  9:45 AM SILVERIO Farias JPPILRO [facility]   1/8/2020 10:30 AM Diogenes Snow, PT MMCPTPB [facility]   1/8/2020 11:00 AM Luisito Dubose OTR/L [facility]CPTPB [facility]   1/10/2020  9:45 AM Luisito Dubose OTR/L [facility]CPTPB [facility]   1/13/2020 10:15 AM Margeret Meter VBPVRDP [facility]   1/13/2020 11:15 AM SILVERIO Clayton WKQASMC [facility]   1/13/2020 12:00 PM Diogenes Snow, PT MMCPTPB [facility]   1/15/2020  1:00 PM Katie Quispe PTA [facility]CPTPB [facility]   1/15/2020  1:30 PM Star Bedolla I, SLP TODKUTQ [facility]   1/15/2020  2:30 PM Luisito Dubose, OTR/L MMCPTPB [facility]   1/17/2020  9:00 AM Margeret Meter KJAEDBC [facility]   1/20/2020  9:45 AM SILVERIO Farias GZCNKUN [facility]   1/20/2020 10:30 AM Diogenes Snow, PT MMCPTPB [facility]   1/20/2020 11:00 AM Margeret Meter BEKJWQP [facility]   1/22/2020 10:15 AM Luisito Dubose, OTR/L MMCPTPB [facility]   1/22/2020 11:00 AM Jordan Villanueva, PT MMCPTPB [facility]   1/22/2020 11:30 AM Rafael Fonseca I, SLP UMJHFUV [facility]   1/24/2020  1:15 PM Andres Lan, OTR/L MMCPTPB [facility]   1/27/2020  9:45 AM Isidro Blanchard, SLP MMCPTPB [facility]   1/27/2020 10:30 AM Jordan Villanueva, PT [facility]CPTPB [facility]   1/27/2020 11:00 AM Myles Re QHBGTJF [facility]   1/29/2020 10:45 AM Isidro Blanchard, SLP MMCPTPB [facility]   1/29/2020 11:30 AM Mariela Flaherty, PTA [facility]CPTPB [facility]   1/29/2020 12:30 PM Myles Re WKZQUCL [facility]   1/31/2020  3:15 PM Myles Re DNYCPPU [facility]   2/3/2020  8:45 AM Myles Re HVZDROB [facility]   2/3/2020 10:00 AM Mariela Flaherty, PTA MMCPTPB [facility]   2/3/2020 10:30 AM Rafael Fonseca I, SLP POIVPXL [facility]   2/5/2020  8:00 AM Myles Re TPBBCEW [facility]   2/5/2020  9:00 AM Rafael Fonseca I, SLP MMCPTPB [facility]   2/5/2020 10:00 AM Mariela Flaherty, PTA MMCPTPB [facility]   2/7/2020  9:00 AM Myles Re PFPXGVG [facility]   2/10/2020  8:30 AM Armando Damico MD [address]   2/10/2020  9:45 AM Isidro Blanchard, SILVERIO VWNKUZO [facility]   2/10/2020 11:00 AM Myles Re RJQITII [facility]   2/10/2020 12:00 PM Mariela Flaherty, PTA MMCPTPB [facility]   2/11/2020  9:15 AM SILVERIO Wilson UEQBWGQ [facility]   2/11/2020 10:00 AM Mariela Flaherty, PTA MMCPTPB [facility]   2/11/2020 10:30 AM Myles Re QVRKMAA [facility]   2/14/2020 10:15 AM Kian Trotter MD [facility]   2/14/2020  1:15 PM Myles Re COLOBUZ [facility]   2/17/2020  8:00 AM Myles Re LOPNQKU [facility]   2/17/2020  9:00 AM Mariela Flaherty, PTA MMCPTPB [facility]   2/17/2020  9:45 AM Isidro Blanchard, SLP VMZAUFS [facility]   2/19/2020  8:00 AM Myles Re ZQDOHPK [facility]   2/19/2020  9:00 AM SILVERIO Lord YUCBZMX [facility]   2/19/2020 10:00 AM Mariela Flaherty, PTA MMCPTPB [facility]   2/21/2020  9:00 AM Myles Re ACCENWV [facility]   2/24/2020  8:45 AM Myles PALACIOYA [facility]   2/24/2020  9:45 AM SILVERIO Lord MMCPTPB [facility]   2/24/2020 10:30 AM Mariela Flaherty PTA [id number] [address]   2/26/2020  8:00 AM Maryjane Orellana KKDTQEK [address]   2/26/2020  9:00 AM SILVERIO Villalobos JPVMJVR [address]   2/26/2020 10:30 AM Edda Salvador, PT KFWWZEZ [address]   2/28/2020  1:15 PM Maryjane Orellana MMCPTPB [address]

## 2020-01-06 NOTE — PROGRESS NOTES
PT DAILY TREATMENT NOTE 10-18    Patient Name: Delon Dowell  Date:2020  : 1955  [x]  Patient  Verified  Payor: [place] MEDICARE / Plan: VA MEDICARE PART A & B / Product Type: Medicare /    In time:10:25  Out time:11:02  Total Treatment Time (min): 37  Visit #: 4 of 12    Medicare/BCBS Only   Total Timed Codes (min):  37 1:1 Treatment Time:  32       Treatment Area: Lower extremity weakness [R29.898]  Cerebral infarction, unspecified [I63.9]    SUBJECTIVE  Pain Level (0-10 scale): 0/10  Any medication changes, allergies to medications, adverse drug reactions, diagnosis change, or new procedure performed?: [x] No    [] Yes (see summary sheet for update)  Subjective functional status/changes:   [] No changes reported  Pt notes he has been trying to do more walking at home without the cane. He has a hard time not dragging his foot at times. He feels better in regards to not locking his knee when he walks.      OBJECTIVE    20 min Therapeutic Exercise:  [x]  See flow sheet :   Rationale: increase ROM, increase strength, improve coordination, improve balance and increase proprioception  to improve the patients ability to improve gait pattern for improved ease/safety with ambulation     17 minutes of gait training to practice with SPC for 150 feet using CGA for improved left step length and better foot clearance during right swing phase of gait        With   [] TE   [] TA   [] neuro   [] other: Patient Education: [x] Review HEP    [] Progressed/Changed HEP based on:   [] positioning   [] body mechanics   [] transfers   [] heat/ice application         Other Objective/Functional Measures:     /75    Cues for prone eccentric hamstring with tactile facilitation to help with activation for strengthening  Improved carryover with gait to prevent hip elevation and not lock the right knee at midstance  He takes a shorter left step length due to right instability and lack of trust  Improved sandra with SPC and better ease of ambulation; minor right toe drag during swing but improved with cueing    Sit to stands had increased left weight shift; educated pt to reach towards the right to encourage more right weight bearing. This was harder for him but able to perform from a low surface    Pain Level (0-10 scale) post treatment: 0/10    ASSESSMENT/Changes in Function:     Pt is progressing well in therapy with improved awareness of corrections during gait. He has better quad control to prevent locking knee in extension at midstance. He continues to have decreased hamstring strength needed for foot clearance during swing phase of gait. He has decreased trust in the right LE causing him to take a shorter left step length. His sandra does improve with SPC due to it's ease of use but needs further gait training for improved safety and confidence to start using more at home and in the community. Progress towards goals / Updated goals:  1. Pt will ambulate 4 steps x 3 with 1 HR with supervision to be able to navigate stairs at home.   2. Pt will increase FOTO score by 7 points in order to demonstrate improved functional ability   3. Pt will increase right hamstring strength to 2+/5 in order to improve right foot clearance and normalize gait pattern for increased ambulatory tolerance and reduced fall risk. 4. Pt will demonstrate Romberg EC on a compliant surface for 15 seconds without LOB in order to demonstrate improved stability in poorly lit environments.    5. Pt will ambulate >150' with SPC and no evidence of instability in order to improve ease of household negotiation with LRAD.     PLAN  [x]  Upgrade activities as tolerated     [x]  Continue plan of care  []  Update interventions per flow sheet       []  Discharge due to:_  []  Other:_      Alexandria Escobedo PTA 1/6/2020  10:32 AM    Future Appointments   Date Time Provider Marc Ruvalcaba   1/8/2020  9:45 AM Rena Rosario, SLP [facility]   1/8/2020 10:30 AM Edda Salvador, PT MMCPTP[facility] [address]   1/8/2020 11:00 AM Agus Cowboy, OTR/L [facility] [address]   1/10/2020  9:45 AM Agus Nelsonboy, OTR/L [facility] [address]   1/13/2020 10:15 AM Maryjane Orellana DFYXOOR [address]   1/13/2020 11:15 AM Terliang Skinner I, SLP GRLZYUK [address]   1/13/2020 12:00 PM Edda Salvador, PT [facility] [address]   1/15/2020  1:00 PM Berna Olson, PTA [facility] [address]   1/15/2020  1:30 PM Zabrina Skinner I, SLP [facility] [address]   1/15/2020  2:30 PM Agus Nelsonboana maria, OTR/L [facility] [address]   1/17/2020  9:00 AM Maryjane Orellana UQWZWOB [address]   1/20/2020  9:45 AM Sandro Perez, SLP CLXWLFR [address]   1/20/2020 10:30 AM Edda Salvador, PT [facility] [address]   1/20/2020 11:00 AM Maryjane Orellana ZDWFCXT [address]   1/22/2020 10:15 AM Agus Goodman, OTR/L [facility] [address]   1/22/2020 11:00 AM Edda Salvador, PT [facility] [address]   1/22/2020 11:30 AM Teresia Skinner I, SLP [facility] [address]   1/24/2020  1:15 PM Agus Goodman, OTR/L [facility] [address]   1/27/2020  9:45 AM Teresia Skinner I, SLP IXEAGEJ [address]   1/27/2020 10:30 AM Edda Salvador, PT [facility] [address]   1/27/2020 11:00 AM Maryjane Orellana LUIQFHV [address]   1/29/2020 10:45 AM Zabrina Skinner I, SLP RHFWTIN [address]   1/29/2020 11:30 AM Berna Olson PTA [facility] [address]   1/29/2020 12:30 PM Maryjane Orellana JJQDHJR [address]   1/31/2020  3:15 PM Maryjane Orellana HFGIMLF [address]   2/3/2020  8:45 AM Maryjane Orellana ACDFOMQ [address]   2/3/2020 10:00 AM Berna Olson PTA [facility] [address]   2/3/2020 10:30 AM Zabrina Skinner I, SILVERIO SAIVZZK [address]   2/5/2020  8:00 AM Maryjane Orellana MENCKUD [address]   2/5/2020  9:00 AM SILVERIO Villalobos MMCPTPB [address]   2/5/2020 10:00 AM Berna Olson PTA [facility] [address]   2/7/2020  9:00 AM Maryjane Orellana QVJBWIR [address]   2/10/2020  8:30 AM Nathanael Byrd  E [facility]   2/10/2020  9:45 AM SILVERIO Song NDAVZUZ [address]   2/10/2020 11:00 AM Maryjane Orellana SEIXYEQ [address]   2/10/2020 12:00 PM Mir Reynoso [facility]CPTPB [address]   2/11/2020  9:15 AM Zabrina Skinner I, SLP [facility] [address]   2/11/2020 10:00 AM Berna Olson PTA BUZLRDM [address]   2/11/2020 10:30 AM Naila Rend QOAWCDD [address]   2/14/2020 10:15 AM Roni Jaffe MD [facility]   2/14/2020  1:15 PM Naila Rend CNTSQIS [address]   2/17/2020  8:00 AM Naila Rend QYNOZYN [address]   2/17/2020  9:00 AM Da Graft, PTA [facility] [address]   2/17/2020  9:45 AM SILVERIO Harper DLSOHSD [address]   2/19/2020  8:00 AM Naila Rend KGGBBWH [address]   2/19/2020  9:00 AM SILVERIO Alcazar OSEYGQU [address]   2/19/2020 10:00 AM Da Graft, PTA [facility] [address]   2/21/2020  9:00 AM Naila Rend UTXEOHF [address]   2/24/2020  8:45 AM Naila Rend OSNPNRN [address]   2/24/2020  9:45 AM SILVERIO Harper GGTYONW [address]   2/24/2020 10:30 AM Da Graft, PTA [facility] [address]   2/26/2020  8:00 AM Naila Rend AOZFLRT [address]   2/26/2020  9:00 AM Malachi Alvarez I, SILVERIO NXJANVR [address]   2/26/2020 10:30 AM Osvaldo Cardoza, PT IUQOLJL [address]   2/28/2020  1:15 PM Naila Rend OEJRNTX [address]

## 2020-01-06 NOTE — PROGRESS NOTES
ST DAILY TREATMENT NOTE    Patient Name: Lydia Abdalla  Date:2020  : 1955  [x]  Patient  Verified  Payor: Payor: VA MEDICARE / Plan: VA MEDICARE PART A & B / Product Type: Medicare /   In time: 950  Out time:1030  Total Treatment Time (min): 40  Visit #: 26 of 36    Treatment Diagnosis: Dysarthria and anarthria [R47.1] oropharyngeal dysphagia [R13.12]    SUBJECTIVE  Pain Level (0-10 scale): 0  Any medication changes, allergies to medications, adverse drug reactions, diagnosis change, or new procedure performed?: [x] No    [] Yes (see summary sheet for update)    Subjective functional status/changes:   [x] No changes reported  Pt reports no recent difficulty eating and drinking. Pt reports compliance with HEP. OBJECTIVE  Treatment provided includes:  Increase/Improve:  []  Voice Quality []  Cognitive Linguistic Skills [x]  Laryngeal/Pharyngeal Exercises   [x]  Vocal Loudness []  Reading Comprehension [x]  Swallowing Skills    []  Vocal Cord Function []  Auditory Comprehension []  Oral Motor Skills   []  Resonance []  Writing Skills [x]  Compensatory strategies    [x]  Speech Intelligibility []  Expressive Language []  Attention   []  Breath Support/Coord.  []  Receptive language []  Memory   [x]  Articulation []  Safety Awareness []    []  Fluency []  Word Retrieval []        Treatment Provided:  - laryngeal/pharyngeal strengthening exercises   - po trials utilizing compensatory strategies   - /s,z/ productions in phrases with phonetic placement cues   - vocal loudness training in functional sentences   - pt education     Patient/Caregiver  Education: [x] Review HEP      HEP/Handouts given: continue oropharyngeal HEP increasing number of reps to 20    Pain Level (0-10 scale) post treatment:0    ASSESSMENT   Progressing in oropharyngeal strength and speech intelligibility   []   Improving appropriately and progressing toward goals  [x]   Improving slowly and progressing toward goals  [] Approximating goals/maximum potential  [x]   Continues to benefit from skilled therapy to address remaining functional deficits  []   Not progressing toward goals and plan of care will be adjusted    Patient will continue to benefit from skilled therapy to address remaining functional deficits: dysphagia, dysarthria     Progress towards goals / Updated goals:  1. The pt will increase quality and length of phonation by sustaining ah utilizing effective diaphragmatic breath support for >15 seconds when provided with min-modA in 3/3 sessions to increase functional speech intelligibility. 1/6/20: not addressed this date      2. The patient will articulate (s/z) consonants at the word level- phrases level with 90% acc with use of comp strategies and OMES  to improve speech intelligibility to > 90% acc when provided with min cues. 1/6/20: at the phrase level: /z/ with 85% accuracy with min-modA/modeling ; /s/ with 85% accuracy with Michael - continued increase in self monitoring and slow rate of articulation and faded cueing      3. The patient will articulate  (l, voiced /th/ consonants at the sentence to conversational level  with 90% acc with use of compensatory strategies and OMES  to improve speech intelligibility to > 90% acc when provided with min cues. 1/6/20: not addressed this date      4.  Pt will demonstrate adequate vocal intensity in varying levels from whisper to yelling with 85% accuracy in structured tasks at the sentence to conversational level when provided with min cues.    1/6/20: slow progress: 85% accuracy with min-modA in sentences with modeling provided.  ModA at the conversational level      5. Patient will complete tongue base retraction,and laryngeal/pharyngeal strengthening exercises (including Sheyla maneuver, Mendelsohn maneuver, modified Shaker with CTAR ball, hard /k/ in isolation,  effortful swallow, etc.), with min cues in 5/5 sessions in order to improve the strength/agility/efficiency of his swallow for improved swallowing safety and QOL.   1/6/20: progressing: modified shaker extended hold x8, rapid reps x30 with Michael, mendelsohn maneuver x20 with Michael     6. Patient will recall/demonstrate aspiration precautions and safe swallowing strategies with 100% acc when provided with occasional cues in 5/5 sessions (small sips/bites; chin tuck with all liquid intake; alternate liquids/solids; slow rate; NO straws, Sit upright at 90 degree angle during PO trials)  to decrease the reported incidences of dysphagia and s/sx of aspiration.   1/6/20: progressing: with Michael to supervision during po trials of thin liquids. no overt s/sx of aspiration observed - cannot rule out silent aspiration    PLAN  [x]  Continue plan of care  []  Modify Goals/Treatment Plan      []  Discharge due to:  [] Other:    SILVERIO Christie 1/6/2020  9:44 AM    Future Appointments   Date Time Provider Marc Ruvalcaba   1/6/2020  9:45 AM SILVERIO Nugent [facility]   1/6/2020 10:30 AM Chip Shi PTA [facility]   1/6/2020 11:00 AM Faheem Philip, OTR/L [facility]   1/8/2020  9:45 AM SILVERIO Nugent [facility]   1/8/2020 10:30 AM Greta Paul, PT [facility]   1/8/2020 11:00 AM Faheem Philip, OTR/L [facility]   1/10/2020  9:45 AM Faheem Philip, OTR/L [facility]   1/13/2020 10:15 AM Marcin Sheppard FFRBUNH [facility]   1/13/2020 11:15 AM SILVERIO Carney QSKVVKV [facility]   1/13/2020 12:00 PM Greta Paul, PT [facility]   1/15/2020  1:00 PM Chip Shi, PTA [facility]   1/15/2020  1:30 PM Giovanny Cochran I, SILVERIO [facility]   1/15/2020  2:30 PM Faheem Philip, OTR/L [facility]   1/17/2020  9:00 AM Burch Knee BEKLWVU [facility]   1/20/2020  9:45 AM SILVERIO Carney MTQKMCB [facility]   1/20/2020 10:30 AM Greta Paul, PT [facility]   1/20/2020 11:00 AM Marcin Knee METJZGF [facility]   1/22/2020 10:15 AM Faheem Philip, OTR/L [facility]   1/22/2020 11:00 AM Greta Paul, PT [facility]   1/22/2020 11:30 AM Breann Hurst SLP [facility]   1/24/2020  1:15 PM Derek Hudson, OTR/L [facility]   1/27/2020  9:45 AM Octavio Kemp I SLP [facility]   1/27/2020 10:30 AM Zulema Haile, PT [facility]   1/27/2020 11:00 AM Elissa Flatness XEHVTNI [facility]   1/29/2020 10:45 AM SILVERIO Olivares NOUGHBL [facility]   1/29/2020 11:30 AM Bipin Nolen, PTA [facility]   1/29/2020 12:30 PM Elissa Flatness GCIXJIC [facility]   2/10/2020  8:30 AM Silvina Biggs MD [address]   2/14/2020 10:15 AM Pierre Quesada MD [facility] Kye Aguilar

## 2020-01-06 NOTE — PROGRESS NOTES
[facility] [employer] [employer] [facility]  [address]  [address] [facility]  [phone] fax    Continued Plan of Care/ Re-certification for Occupational Therapy Services    Patient name: Nora Venegas Start of Care: 10/22/19   Referral source: Christian Carr NP : 1955   Medical/Treatment Diagnosis: Upper extremity weakness [R29.898]  Cerebral infarction, unspecified [I63.9]  Payor: VA MEDICARE / Plan: VA MEDICARE PART A & B / Product Type: Medicare /  Onset Date:19     Prior Hospitalization: see medical history Provider#: [id number]   Medications: Verified on Patient Summary List    Comorbidities: *Depression, HTN, DM, arthritis  Prior Level of Function:*REtired from [employer], I self care yard care driving, part time parts delivery, carpentry work**  Visits from [employer] of Care: 30    Missed Visits: 0    The Plan of Care and following information is based on the patient's current status:    1.  Patient will be able to actively grasp and release light items ith right hand to assist with self care tasks  Status at last recert:. No active grasp or release  Current StatusAble to grasp and extract hand from 1 in dowel 19, able to complete 10x 12/30/19     2.  Patient will be able to actively flex shoulder to 45 degrees to improve ability to place arm through sleeve. Status at last recert: no active shoulder motion in sitting  Current Status ;Progressing  30 degrees  , able to keep in flexion in supine for more than one minute, , able to knock balls off [place] tree using active motion right UE 19      3.  Patient will be able to actively extend wrist to posiiton hand for grasp of items.   Status at last recert: Able to extend with NMES only, order placed for unit  Status at last visit-no active extension noted  20     4.  Patient will report improved ability to use right hand to assist with home care as shown by reduced limitation of at least 20% on Neuro quality of Life   Status at last recert: Limitation 40%  Current status: Limitation 58% Goal Met 1/2/20    Key functional changes: Improved shoulder flexion in sitting and supine, improved elbow flexion without synergy pattern. No active wrist or digit extension, active grasp of items noted. Has NMES and is compliant with daily home use 2x day. 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   Patient will be able to actively grasp and release light items ith right hand to assist with self care tasks  2.  Patient will be able to actively flex shoulder to 45 degrees to improve ability to place arm through sleeve. 3.  Patient will be able to actively extend wrist to posiiton hand for grasp of items. New goal:   4.  .  Patient will robert able to actively abduct shoulder to put on deodorant. Frequency / Duration: Patient to be seen 2-3 times per week for 4 weeks:    Assessment / Recommendations:*Patient has shown improved shoulder recruitment and elbow flexion. He continues to lack active wrist extension and now has NMES for home use to further progress and reduce atrophy. Certification Period: 1/6/20-2/4/20    MALLIKA Short/L 1/6/2020 7:55 AM    ________________________________________________________________________  I certify that the above Therapy Services are being furnished while the patient is under my care. I agree with the treatment plan and certify that this therapy is necessary.       [de-identified] Signature:____________Date:_________TIME:________    [employer], Date and Time must be completed for valid certification **      Please sign and return to [facility] [employer] [facility] [place]  [address] [facility]            [phone] fax

## 2020-01-08 ENCOUNTER — HOSPITAL ENCOUNTER (OUTPATIENT)
Dept: PHYSICAL THERAPY | Age: 65
Discharge: HOME | End: 2020-01-08
Payer: MEDICARE

## 2020-01-08 PROCEDURE — 92507 TX SP LANG VOICE COMM INDIV: CPT

## 2020-01-08 PROCEDURE — 97530 THERAPEUTIC ACTIVITIES: CPT

## 2020-01-08 PROCEDURE — 97112 NEUROMUSCULAR REEDUCATION: CPT

## 2020-01-08 PROCEDURE — 92526 ORAL FUNCTION THERAPY: CPT

## 2020-01-08 PROCEDURE — 97110 THERAPEUTIC EXERCISES: CPT

## 2020-01-08 NOTE — PROGRESS NOTES
PT DAILY TREATMENT NOTE 10-18    Patient Name: Wilberto Dee  Date:2020  : 1955  [x]  Patient  Verified  Payor: [place] MEDICARE / Plan: [place] MEDICARE PART A & B / Product Type: Medicare /    In time:1030  Out time:1103  Total Treatment Time (min): 33  Visit #: 5 of 12    Medicare/BCBS Only   Total Timed Codes (min):  33 1:1 Treatment Time:  33       Treatment Area: Lower extremity weakness [R29.898]  Cerebral infarction, unspecified [I63.9]    SUBJECTIVE  Pain Level (0-10 scale): 0/10  Any medication changes, allergies to medications, adverse drug reactions, diagnosis change, or new procedure performed?: [x] No    [] Yes (see summary sheet for update)  Subjective functional status/changes:   [] No changes reported  Pt reports he drove in a parking lot for 10 mins using his right LE the whole time  Pt reports he purchased a SPC. OBJECTIVE      33 min Therapeutic Exercise:  [] See flow sheet :   Rationale: increase ROM, increase strength, improve coordination, improve balance and increase proprioception to improve the patients ability to ease with ambulation. With   [] TE   [] TA   [] neuro   [] other: Patient Education: [x] Review HEP    [] Progressed/Changed HEP based on:   [] positioning   [] body mechanics   [] transfers   [] heat/ice application    [] other:      Other Objective/Functional Measures: Lots of tactile cueing to encourage facilitation of the involved muscles. Difficulty performing calf raises DL__SL due to weakness. Performed piter knees on the right. Performed hip hike off the 4 inch step. Pain Level (0-10 scale) post treatment: 0/10    ASSESSMENT/Changes in Function: Progressing well with SPC ambulation. Encouraged to place weight onto right LE during midstance while ambulating with SPC.        Patient will continue to benefit from skilled PT services to modify and progress therapeutic interventions, address functional mobility deficits, address ROM deficits, address strength deficits, analyze and address soft tissue restrictions, analyze and cue movement patterns, analyze and modify body mechanics/ergonomics, assess and modify postural abnormalities and address imbalance/dizziness to attain remaining goals. [x]  See Plan of Care  []  See progress note/recertification  []  See Discharge Summary         Progress towards goals / Updated goals:  1. Pt will ambulate 4 steps x 3 with 1 HR with supervision to be able to navigate stairs at home.   2. Pt will increase FOTO score by 7 points in order to demonstrate improved functional ability   3. Pt will increase right hamstring strength to 2+/5 in order to improve right foot clearance and normalize gait pattern for increased ambulatory tolerance and reduced fall risk.   4. Pt will demonstrate Romberg EC on a compliant surface for 15 seconds without LOB in order to demonstrate improved stability in poorly lit environments.   5. Pt will ambulate >150' with SPC and no evidence of instability in order to improve ease of household negotiation with LRAD. Progressing well. Pt is ambulating with SPC in clinic.  1/8/20    PLAN  [x]  Upgrade activities as tolerated     [x]  Continue plan of care  []  Update interventions per flow sheet       []  Discharge due to:_  []  Other:_      Irina Santiago, PT 1/8/2020  9:05 AM    Future Appointments   Date Time Provider Marc Peg   1/8/2020  9:45 AM SILVERIO Walden DPSIUUD [facility]   1/8/2020 10:30 AM Sherrie Schlatter, SUKHWINDER MMCPTPB [facility]   1/8/2020 11:00 AM Josy Tao, OTR/L MMCPTPB [facility]   1/10/2020  9:45 AM Josy Tao, OTR/L MMCPTPB [facility]   1/13/2020 10:15 AM Dee Dee Norman [id number] [facility]   1/13/2020 11:15 AM SILVERIO Fabian MMCPTPB [facility]   1/13/2020 12:00 PM Sherrie Schlatter, PT MMCPTPB [facility]   1/15/2020  1:00 PM Livia Fields, PTA MMCPTPB [facility]   1/15/2020  1:30 PM Sarah Winkler I, SLP YQLGCBD [facility]   1/15/2020  2:30 PM Josy Tao, OTR/L MMCPTPB [facility]   1/17/2020  9:00 AM Dee Dee GILLISYSV [facility]   1/20/2020  9:45 AM Cass Godfrey, SILVERIO PTDGHUT [facility]   1/20/2020 10:30 AM Chata Hale, PT MMCPTPB [facility]   1/20/2020 11:00 AM Trude Mates DEQRKJM [facility]   1/22/2020 10:15 AM Mat Felix, OTR/L MMCPTPB [facility]   1/22/2020 11:00 AM Chata Hale, PT MMCPTPB [facility]   1/22/2020 11:30 AM Sebastian Lundy I, SLP PTJEGYT [facility]   1/24/2020  1:15 PM Mat Felix, OTR/L MMCPTPB [facility]   1/27/2020  9:45 AM Sebastian Lundy I SLP [id number] [facility]   1/27/2020 10:30 AM Chata Hale, PT MMCPTPB [facility]   1/27/2020 11:00 AM Trude Mates XKUEZKB [facility]   1/29/2020 10:45 AM Sebastian Lundy I, SLP NJLAODY [facility]   1/29/2020 11:30 AM Padmaja Maciel, PTA MMCPTPB [facility]   1/29/2020 12:30 PM Trude Mates FLCWHEX [facility]   1/31/2020  3:15 PM Trude Mates BMMJSEN [facility]   2/3/2020  8:45 AM Trude Mates UVXBRRS [facility]   2/3/2020 10:00 AM Padmaja Maciel, PTA MMCPTPB [facility]   2/3/2020 10:30 AM SILVERIO Mesa GRNGPGD [facility]   2/5/2020  8:00 AM Trude Mates NKIAUEU [facility]   2/5/2020  9:00 AM SILVERIO Mesa MMCPTPB [facility]   2/5/2020 10:00 AM Padmaja Maciel, PTA MMCPTPB [facility]   2/7/2020  9:00 AM Felicitas Vizcaino MRYKZFV [facility]   2/10/2020  8:30 AM Cas Alberts MD [address]   2/10/2020  9:45 AM SILVERIO Martin MMCPTPB [facility]   2/10/2020 11:00 AM Felicitas Vizcaino ARQFOOR [facility]   2/10/2020 12:00 PM Padmaja Maciel, PTA MMCPTPB [facility]   2/11/2020  9:15 AM SILVERIO Martin MMCPTPB [facility]   2/11/2020 10:00 AM Padmaja Maciel, PTA MMCPTPB [facility]   2/11/2020 10:30 AM Felicitas Vizcaino DVJTSIC [facility]   2/14/2020 10:15 AM Austyn Hernández MD [facility]   2/14/2020  1:15 PM Trchun Vizcaino BOAHBOL [facility]   2/17/2020  8:00 AM Trcuhn Vizcaino MMCPTPB [facility]   2/17/2020  9:00 AM Padmaja Maciel, PTA MMCPTPB [facility]   2/17/2020  9:45 AM SILVERIO Martin BQOZGYM [facility]   2/19/2020  8:00 AM Trude Mates TYSRUTV [facility]   2/19/2020  9:00 AM SILVERIO Mesa MMCPTPB [facility]   2/19/2020 10:00 AM Padmaja Maciel PTA MMCPTPB [facility] 2/21/2020  9:00 AM Hoda Beyer CXICLNN [facility]   2/24/2020  8:45 AM Hoda Beyer QZXIWEX [facility]   2/24/2020  9:45 AM Haylee Boykin, SILVERIO WXZKSWF [facility]   2/24/2020 10:30 AM Scout Younger PTA MMCPTPB [facility]   2/26/2020  8:00 AM Hoda Beyer ZMLQHJR [facility]   2/26/2020  9:00 AM SILVERIO Stinson CEMUXAG [facility]   2/26/2020 10:30 AM Chelle Garrett, PT NQUQFZR [facility]   2/28/2020  1:15 PM Hoda Beyer MMCPTPB [facility]

## 2020-01-08 NOTE — PROGRESS NOTES
OT DAILY TREATMENT NOTE 10-18    Patient Name: Sandra Almazan  Date:2020  : 1955  [x]  Patient  Verified  Payor: VA MEDICARE / Plan: VA MEDICARE PART A & B / Product Type: Medicare /    In time:1100  Out time:1145  Total Treatment Time (min): 45  Visit #: 2 of 12    Medicare/BCBS Only   Total Timed Codes (min):  45 1:1 Treatment Time:  45     Treatment Area: Upper extremity weakness [R29.898]  Cerebral infarction, unspecified [I63.9]    SUBJECTIVE  Pain Level (0-10 scale): 0/10  Any medication changes, allergies to medications, adverse drug reactions, diagnosis change, or new procedure performed?: [x] No    [] Yes (see summary sheet for update)  Subjective functional status/changes:   [] No changes reported  *I drove around a parking lot with my wife-I did great**    OBJECTIVE          20 min Therapeutic Activity:  []  See flow sheet :   Rationale: increase ROM and improve coordination  to improve the patients ability to grasp  Dowle 1 inch grasp and move able to complete only once today  Standing grasp of sock ball success x 1     25 min Neuromuscular Re-education:  []  See flow sheet :   Rationale: increase ROM and increase strength  to improve the patients ability to move right UE  Right UE  Supine shoulder abduciton x 10  Sidelying shoulder flex on powder board x 10  Seated AAROM wrist ext gravity eliminated with possible thumb and digit moiton noted  Seated recip sup pro x 10,   Seated elboew flex ext x 10 to 90    With   [] TE   [] TA   [] neuro   [] other: Patient Education: [x] Review HEP    [] Progressed/Changed HEP based on: practice grasp at home  [] positioning   [] body mechanics   [] transfers   [] heat/ice application   [] Splint wear/care   [] Sensory re-education   [] scar management      [] other: Do not drive unless MD has written in your chart that it is ok to do so.    eval at [employer] support meeting in February           Other Objective/Functional Measures:   Right UE  Able to supinate with some success today  Shoulder abd to 60 in supine     Pain Level (0-10 scale) post treatment: 0/10    ASSESSMENT/Changes in Function: Patient anxious ot return to driving but appears to understand safety and legal issues    Patient will continue to benefit from skilled OT services to modify and progress therapeutic interventions, address ROM deficits, address strength deficits, analyze and cue movement patterns and instruct in home and community integration to attain remaining goals. []  See Plan of Care  []  See progress note/recertification  []  See Discharge Summary         Progress towards goals / Updated goals:  1.   Patient will be able to actively grasp and release light items ith right hand to assist with self care tasks  Current status-grasped sock ball x 1 1/8/20  Status at last visit: Able to grasp sock ball today 1/6/20  2.  Patient will be able to actively flex shoulder to 45 degrees to improve ability to place arm through sleeve. 3.  Patient will be able to actively extend wrist to posiiton hand for grasp of items. Current status: ACtive extension not seen 1/8/20  New goal:   4.  .  Patient will robert able to actively abduct shoulder to put on deodorant.   Status last visit: Palpable middlle deltoid with poor strength 1/6/20, improved in supine 1/8/20    PLAN  []  Upgrade activities as tolerated     [x]  Continue plan of care  []  Update interventions per flow sheet       []  Discharge due to:_  []  Other:_      Brielle Ford, OTR/L 1/8/2020  8:07 AM    Future Appointments   Date Time Provider Marc Ruvalcaba   1/8/2020  9:45 AM Maggy Villagran, SLP RWKJHMX [facility]   1/8/2020 10:30 AM Lilliam Shannon, PT MMCPTPB [facility]   1/8/2020 11:00 AM Melba Reed, OTR/L MMCPTPB [facility]   1/10/2020  9:45 AM Melba Reed, OTR/L MMCPTPB [facility]   1/13/2020 10:15 AM Karime Hays LLCEOFI [facility]   1/13/2020 11:15 AM SILVERIO Sims QOMDNBT [facility]   1/13/2020 12:00 PM Lilliam Shannon PT MMCPTPB [facility]   1/15/2020  1:00 PM Dave Salamanca, PTA MMCPTPB [facility]   1/15/2020  1:30 PM Alex Bookbinder I, SLP MMCPTPB [facility]   1/15/2020  2:30 PM Levi Mustafa, OTR/L MMCPTPB [facility]   1/17/2020  9:00 AM Sawyer BONNER [facility]   1/20/2020  9:45 AM Reji Payton, SLP UNZWSHX [facility]   1/20/2020 10:30 AM Jaun Nissen, PT MMCPTPB [facility]   1/20/2020 11:00 AM Sawyer Asencio WTTURXH [facility]   1/22/2020 10:15 AM Levi Mustafa, OTR/L MMCPTPB [facility]   1/22/2020 11:00 AM Jaun Nissen, PT MMCPTPB [facility]   1/22/2020 11:30 AM Alex Purabinder I, SLP MMCPTPB [facility]   1/24/2020  1:15 PM Levi Mustafa, OTR/L MMCPTPB [facility]   1/27/2020  9:45 AM Alex Purabinder I, SLP MADINA [facility]   1/27/2020 10:30 AM Jaun Nissen, PT MMCPTPB [facility]   1/27/2020 11:00 AM Sawyer Asencio [facility]SFBKXL [facility]   1/29/2020 10:45 AM Alex Bookbinder I, SLP [id number] [facility]   1/29/2020 11:30 AM Dave Salamanca, PTA MMCPTPB [facility]   1/29/2020 12:30 PM Sawyer Asencio MMCPTPB [facility]   1/31/2020  3:15 PM Sawyer Asencio QOGRICM [facility]   2/3/2020  8:45 AM Sawyer Asencio ARHPILB [facility]   2/3/2020 10:00 AM Dave Salamanca, PTA MMCPTPB [facility]   2/3/2020 10:30 AM Alex Hernández I, SLP [id number] [facility]   2/5/2020  8:00 AM Sawyer Asencio JRSDWOJ [facility]   2/5/2020  9:00 AM SILVERIO MccainPTPB [facility]   2/5/2020 10:00 AM Dave Salamanca PTA MMCPTPB [facility]   2/7/2020  9:00 AM Sawyer Asencio WDJTXCS [facility]   2/10/2020  8:30 AM Manolo Albert MD [address]   2/10/2020  9:45 AM SILVERIO Mccain BVRXULY [facility]   2/10/2020 11:00 AM Sawyer Asencio KHLIOXV [facility]   2/10/2020 12:00 PM Dave Salamanca, PTA MMCPTPB [facility]   2/11/2020  9:15 AM SILVERIO An MRTWRAI [facility]   2/11/2020 10:00 AM Dave Salamanca, PTA MMCPTPB [facility]   2/11/2020 10:30 AM Sawyer Asencio KHEPAXL [facility]   2/14/2020 10:15 AM Steffanie Donovan MD [facility]   2/14/2020  1:15 PM Sawyer Asencio TJCCIAG [facility]   2/17/2020  8:00 AM Sawyer Asencio LZORXAU [facility]   2/17/2020  9:00 AM Dave Salamanca PTA MMCPTPB [facility]   2/17/2020  9:45 AM Reji Payton, SLP MMCPTPB [facility]   2/19/2020  8:00 AM Bashir Mk BRSSXWK [facility]   2/19/2020  9:00 AM Chelsea Leigh I, SLP WDTPLES [facility]   2/19/2020 10:00 AM Hamilton Madden, PTA MMCPTPB [facility]   2/21/2020  9:00 AM Bashir Mk ACZNNAP [facility]   2/24/2020  8:45 AM Bashir Mk FCABNJY [facility]   2/24/2020  9:45 AM Tiki Joaquin SLP QLSFFTH [facility]   2/24/2020 10:30 AM Hamilton Madden, PTA MMCPTPB [facility]   2/26/2020  8:00 AM Bashirflorinda Terrazas UETYICS [facility]   2/26/2020  9:00 AM Chelsea Leigh I, SLP ROKNBYO [facility]   2/26/2020 10:30 AM Omar Chaidez, PT CUPQNWD [facility]   2/28/2020  1:15 PM Bashir Terrazas PAKPGIP [facility]

## 2020-01-08 NOTE — PROGRESS NOTES
ST DAILY TREATMENT NOTE    Patient Name: Mary Madden  Date:2020  : 1955  [x]  Patient  Verified  Payor: Payor: [place] MEDICARE / Plan: [place] MEDICARE PART A & B / Product Type: Medicare /   In time: 9:53  Out time: 10:30  Total Treatment Time (min): 37  Visit #: 27 of 36    Treatment Diagnosis: Dysarthria and anarthria [R47.1] oropharyngeal dysphagia [R13.12]    SUBJECTIVE  Pain Level (0-10 scale): Any medication changes, allergies to medications, adverse drug reactions, diagnosis change, or new procedure performed?: [x] No    [] Yes (see summary sheet for update)    Subjective functional status/changes:   [x] No changes reported  Pt reports occasional throat clearing during meals, however significant decrease in frequency     OBJECTIVE  Treatment provided includes:  Increase/Improve:  []  Voice Quality []  Cognitive Linguistic Skills [x]  Laryngeal/Pharyngeal Exercises   []  Vocal Loudness []  Reading Comprehension [x]  Swallowing Skills    []  Vocal Cord Function []  Auditory Comprehension []  Oral Motor Skills   []  Resonance []  Writing Skills [x]  Compensatory strategies    [x]  Speech Intelligibility []  Expressive Language []  Attention   [x]  Breath Support/Coord.  []  Receptive language []  Memory   [x]  Articulation []  Safety Awareness []    []  Fluency []  Word Retrieval []        Treatment Provided:  - modified shaker   - high \"ee\" for laryngeal elevation   - po trials of mixed consistency using compensatory strategies   - /th/ and /l/ in sentences utilizing compensatory speech strategies   - vowel prolongations for breath support/coordination training   - pt education     Patient/Caregiver  Education: [x] Review HEP      HEP/Handouts given: continue hep established     Pain Level (0-10 scale) post treatment: 0    ASSESSMENT   []   Improving appropriately and progressing toward goals  [x]   Improving slowly and progressing toward goals  []   Approximating goals/maximum potential  [x] Continues to benefit from skilled therapy to address remaining functional deficits  []   Not progressing toward goals and plan of care will be adjusted    Patient will continue to benefit from skilled therapy to address remaining functional deficits: dysphagia dysarthria     Progress towards goals / Updated goals:  1. The pt will increase quality and length of phonation by sustaining ah utilizing effective diaphragmatic breath support for >15 seconds when provided with min-modA in 3/3 sessions to increase functional speech intelligibility. 1/8/20: average 10.5 seconds across 5 trials      2. The patient will articulate (s/z) consonants at the word level- phrases level with 90% acc with use of comp strategies and OMES  to improve speech intelligibility to > 90% acc when provided with min cues. 1/8/20:  at the phrase level:  /s/ with 95% accuracy with modA / modeling to use slow rate and for phonetic placement       3. The patient will articulate  (l, voiced /th/ consonants at the sentence to conversational level  with 90% acc with use of compensatory strategies and OMES  to improve speech intelligibility to > 90% acc when provided with min cues. 1/8/20: /th/ with 90% accuracy with Michael in sentences ; /l/ with 80% accuracy with Michael in sentences      4.  Pt will demonstrate adequate vocal intensity in varying levels from whisper to yelling with 85% accuracy in structured tasks at the sentence to conversational level when provided with min cues.    1/8/20: slow progress: 90% accuracy with min-modA in sentences.  ModA at the conversational level      5. Patient will complete tongue base retraction,and laryngeal/pharyngeal strengthening exercises (including Sheyla maneuver, Mendelsohn maneuver, modified Shaker with CTAR ball, hard /k/ in isolation,  effortful swallow, etc.), with min cues in 5/5 sessions in order to improve the strength/agility/efficiency of his swallow for improved swallowing safety and QOL.  1/8/20: progressing: modified shaker rapid repetitions x30, high \"ee\" x20 with Michael      6. Patient will recall/demonstrate aspiration precautions and safe swallowing strategies with 100% acc when provided with occasional cues in 5/5 sessions (small sips/bites; chin tuck with all liquid intake; alternate liquids/solids; slow rate; NO straws, Sit upright at 90 degree angle during PO trials)  to decrease the reported incidences of dysphagia and s/sx of aspiration.   1/8/20:  progressing: x1 immediate throat clear for mixed consistency likely d/t larger bite utilized compensatory strategies with min cues, 6 trials without overt s/sx of aspiration, cannot rule out silent aspiration     PLAN  [x]  Continue plan of care  []  Modify Goals/Treatment Plan      []  Discharge due to:  [] Other:    SILVERIO Dunne 1/8/2020  9:55 AM    Future Appointments   Date Time Provider Marc Ruvalcaba   1/8/2020 10:30 AM Paxton Cantu PT [facility]CP[facility]   1/8/2020 11:00 AM Christiano Ibarra, OTR/L [facility]   1/10/2020  9:45 AM Christiano Ibarra, OTR/L [facility]   1/13/2020 10:15 AM Nanette Whitten QQZBKSB [facility]   1/13/2020 11:15 AM SILVERIO Gomez MMCPTPB [facility]   1/13/2020 12:00 PM Paxton Cantu PT [facility]   1/15/2020  1:00 PM Diamante Noriega PTA [facility]   1/15/2020  1:30 PM SILVERIO Gomez [facility]CPTP[facility]   1/15/2020  2:30 PM Christiano Ibarra, OTR/L [facility]CP[facility]   1/17/2020  9:00 AM Nanette Whitten DKWZKBE [facility]   1/20/2020  9:45 AM Miranda Jason I, SLP [facility]   1/20/2020 10:30 AM Paxton Cantu, PT [facility]   1/20/2020 11:00 AM Nanette Whitten QMLSNZX [facility]   1/22/2020 10:15 AM Christiano Ibarra, OTR/L [facility]CPTPB [facility]   1/22/2020 11:00 AM Paxton Cantu, PT [facility]   1/22/2020 11:30 AM SILVERIO Garcia YNMFBRE [facility]   1/24/2020  1:15 PM Christiano Ibarra, OTR/L [facility]   1/27/2020  9:45 AM Miranda Jason I, SLP [facility]   1/27/2020 10:30 AM Paxton Cantu, PT [facility]   1/27/2020 11:00 AM Nanette Whitten QJPIBZW [facility]   1/29/2020 10:45 AM SILVERIO Farias CXCODRY [facility]   1/29/2020 11:30 AM Katie Quispe PTA [facility]   1/29/2020 12:30 PM Margeret Meter WPJMJSY [facility]   1/31/2020  3:15 PM Margeret Meter GCNCQNY [facility]   2/3/2020  8:45 AM Margeret Meter MRGBBTD [facility]   2/3/2020 10:00 AM Katie Quispe PTA [facility]   2/3/2020 10:30 AM SILVERIO Clayton DVJIGYS [facility]   2/5/2020  8:00 AM Margeret Meter HULPOMY [facility]   2/5/2020  9:00 AM SILVERIO Clayton KEDOCNC [facility]   2/5/2020 10:00 AM Katie Quispe PTA [facility]CPTP[facility]   2/7/2020  9:00 AM Margeret Meter YCAMWPZ [facility]   2/10/2020  8:30 AM Yulissa Benitez MD [address]   2/10/2020  9:45 AM SILVERIO Farias BTBZHNS [facility]   2/10/2020 11:00 AM Margeret Meter KMWNTBB [facility]   2/10/2020 12:00 PM Katie Quispe, PTA [facility]CPTP[facility]   2/11/2020  9:15 AM SILVERIO Farias FKKBVOV [facility]   2/11/2020 10:00 AM Katie Quispe, PTA MMCPTPB [facility]   2/11/2020 10:30 AM Margeret Meter ZETJBHF [facility]   2/14/2020 10:15 AM Solitario Nj MD [facility]   2/14/2020  1:15 PM Margeret Meter QWIBKTC [facility]   2/17/2020  8:00 AM Margeret Meter SQHENXA [facility]   2/17/2020  9:00 AM Katie Quispe, LINDA MMCPTPB [facility]   2/17/2020  9:45 AM SILVERIO Farias KHWQYNA [facility]   2/19/2020  8:00 AM Margeret Meter JPBPTOC [facility]   2/19/2020  9:00 AM SILVERIO Clayton AQXSRTC [facility]   2/19/2020 10:00 AM Katie Quispe, LINDA MMCPTPB [facility]   2/21/2020  9:00 AM Margeret Meter KMVBBYQ [facility]   2/24/2020  8:45 AM Margeret Meter MMCPTPB [facility]   2/24/2020  9:45 AM SILVERIO Farias MMCPTPB [facility]   2/24/2020 10:30 AM Katie Quispe, PTA [facility]CPTP[facility]   2/26/2020  8:00 AM Margeret Meter CAGSBID [facility]   2/26/2020  9:00 AM Star Bedolla I, SLP YVZBRWQ [facility]   2/26/2020 10:30 AM Diogenes Snow, PT RGZAAJL [facility]   2/28/2020  1:15 PM Amina LAU [facility]

## 2020-01-10 ENCOUNTER — HOSPITAL ENCOUNTER (OUTPATIENT)
Dept: PHYSICAL THERAPY | Age: 65
Discharge: HOME | End: 2020-01-10
Payer: MEDICARE

## 2020-01-10 ENCOUNTER — APPOINTMENT (OUTPATIENT)
Dept: PHYSICAL THERAPY | Age: 65
End: 2020-01-10
Payer: MEDICARE

## 2020-01-10 PROCEDURE — 97110 THERAPEUTIC EXERCISES: CPT

## 2020-01-10 PROCEDURE — 97032 APPL MODALITY 1+ESTIM EA 15: CPT

## 2020-01-10 PROCEDURE — 97112 NEUROMUSCULAR REEDUCATION: CPT

## 2020-01-10 NOTE — PROGRESS NOTES
OT DAILY TREATMENT NOTE 10-18    Patient Name: Matthew Payne  Date:1/10/2020  : 1955  [x]  Patient  Verified  Payor: [place] MEDICARE / Plan: [place] MEDICARE PART A & B / Product Type: Medicare /    In time:945  Out time:1030  Total Treatment Time (min): 45  Visit #: 3 of 12    Medicare/BCBS Only   Total Timed Codes (min):  45 1:1 Treatment Time:  45     Treatment Area: Upper extremity weakness [R29.898]  Cerebral infarction, unspecified [I63.9]    SUBJECTIVE  Pain Level (0-10 scale): 0/10  Any medication changes, allergies to medications, adverse drug reactions, diagnosis change, or new procedure performed?: [x] No    [] Yes (see summary sheet for update)  Subjective functional status/changes:   [] No changes reported  It looks like I won't be able to use my hand again  What can I do at home to work on this ( pro sup)    OBJECTIVE    Modality rationale: increase muscle contraction/control to improve the patients ability to move shoulder in abduciton and flexion   Min Type Additional Details    [] Estim:  []Unatt       []IFC  []Premod                        []Other:  []w/ice   []w/heat  Position:  Location:   15 [] Estim: []Att    []TENS instruct  [x]NMES                    []Other:  []w/US   []w/ice   []w/heat  Position:ant deltoid, middle deltoid  Location:    []  Traction: [] Cervical       []Lumbar                       [] Prone          []Supine                       []Intermittent   []Continuous Lbs:  [] before manual  [] after manual    []  Ultrasound: []Continuous   [] Pulsed                           []1MHz   []3MHz W/cm2:  Location:    []  Iontophoresis with dexamethasone         Location: [] Take home patch   [] In clinic    []  Ice     []  heat  []  Ice massage  []  Laser   []  Anodyne Position:  Location:    []  Laser with stim  []  Other:  Position:  Location:    []  Vasopneumatic Device Pressure:       [] lo [] med [] hi   Temperature: [] lo [] med [] hi       [x] Skin assessment post-treatment: [x]intact []redness- no adverse reaction    []redness - adverse reaction:       15 min Therapeutic Exercise:  [] See flow sheet :   Rationale: increase ROM to improve the patients ability to abduct and flex shoulder and elbow  AROM elbow flex ext x 10  AROM shoulder flex in sitting right  AROM shoulder abd in sitting right  PROm wrist and hand right     5 min Therapeutic Activity:  []  See flow sheet :   Rationale: increase ROM and improve coordination  to improve the patients ability to grasp  PVC grasp on GAMEVILbo balls able to position hand around PVC pipe but not able to provide elbow flexion to lift ball up 2 inches from board. 10 min Neuromuscular Re-education:  []  See flow sheet :   Rationale: increase ROM and increase strength  to improve the patients ability to move right UE  Right UE  Seated quick stretch for sup pro x 10  Quick stretch for wrist ext x 10 with wrist in gravity eliminated posiiton      With   [] TE   [] TA   [] neuro   [] other: Patient Education: [x] Review HEP    [] Progressed/Changed HEP based on:   [] positioning   [] body mechanics   [] transfers   [] heat/ice application   [] Splint wear/care   [] Sensory re-education   [] scar management      [] other: NMES for right shoulder, reviewed progress and possiblity that hand may not return but improtance of continuing to exercise and attempt movement           Other Objective/Functional Measures:   ACtive shoulder abd in sitting limited to 10 degrees, improved with NMES use  ACtive shoulder flex in right in sitting at 20-30 degrees, improved with NMES use     Pain Level (0-10 scale) post treatment: 0/10    ASSESSMENT/Changes in Function: Not able to extend wrist with quick stretch , limited shoulder abd and flexion in sitting.   Seems ot understand electrode placement for NMES for shoulder, beginning to grasp and deal with issue of possible permanent disability    Patient will continue to benefit from skilled OT services to modify and progress therapeutic interventions, address ROM deficits, address strength deficits, analyze and cue movement patterns and instruct in home and community integration to attain remaining goals. []  See Plan of Care  []  See progress note/recertification  []  See Discharge Summary         Progress towards goals / Updated goals:  *1.   Patient will be able to actively grasp and release light items ith right hand to assist with self care tasks  Current status-grasped sock ball x 1 1/8/20, not able to grasp PVC but not tightly enough to  move PVC on [place] 1/10/20  Status at last visit: Able to grasp sock ball today 1/6/20  2.  Patient will be able to actively flex shoulder to 45 degrees to improve ability to place arm through sleeve. Current status: REviewed NMESe 1/10/20  3.  Patient will be able to actively extend wrist to posiiton hand for grasp of items. Current status: ACtive extension not seen 1/8/20  New goal:   4.  .  Patient will robert able to actively abduct shoulder to put on deodorant.   Status last visit: Palpable middlle deltoid with poor strength 1/6/20, improved in supine 1/8/20, reviewed NMES    PLAN  []  Upgrade activities as tolerated     [x]  Continue plan of care  []  Update interventions per flow sheet       []  Discharge due to:_  []  Other:_      MALLIKA Rosales/L 1/10/2020  8:50 AM    Future Appointments   Date Time Provider Marc Ruvalcaba   1/10/2020  9:45 AM MALLIKA Fuentes/ROVERTO [facility]CPTPB [facility]   1/13/2020 10:15 AM Lorrie COBB [facility]   1/13/2020 11:15 AM Martin Dunn, SILVERIO JMKCWTU [facility]   1/13/2020 12:00 PM Sandra Kennedy, PT [facility]   1/15/2020  1:00 PM Taisha Harris, PTA [facility]   1/15/2020  1:30 PM Martin Dunn, SLP PZJYYQY [facility]   1/15/2020  2:30 PM Kimmy Ward, OTR/L [facility]CP[facility]   1/17/2020  9:00 AM Lorrie Camara NTXPYERYN [facility]   1/20/2020  9:45 AM Martin Dunn, SILVERIO AXUKSWI [facility]   1/20/2020 10:30 AM Sandra Kennedy, PT [facility]CPTP[facility]   1/20/2020 11:00 AM Linette Oscar ICESRZN [facility]   1/22/2020 10:15 AM Nisreen Desai, OTR/L [facility]CPTP[facility]   1/22/2020 11:00 AM Mcfarland Coke, PT [facility]   1/22/2020 11:30 AM Boston Rosenberg I, SILVERIO VSKQGWJ [facility]   1/24/2020  1:15 PM Nisreen Desai, OTR/L [facility]   1/27/2020  9:45 AM SILVERIO Puente MMCPTPB [facility]   1/27/2020 10:30 AM Bartolo Coke, PT [facility]   1/27/2020 11:00 AM Linette Oscar YNAVOBJ [facility]   1/29/2020 10:45 AM Boston Rosenberg I, SILVERIO KPCQCSI [facility]   1/29/2020 11:30 AM Mami Screen, PTA [facility]   1/29/2020 12:30 PM Linette Oscar RAGEGKU [facility]   1/31/2020  3:15 PM Elkay Oscar KWMIKXI [facility]   2/3/2020  8:45 AM Linette Oscar SCRLFDU [facility]   2/3/2020 10:00 AM Mami Screen, PTA MMCPTPB [facility]   2/3/2020 10:30 AM Boston Rosenberg I, SLP KHZJTHZ [facility]   2/5/2020  8:00 AM Linette Oscar YEXWMOM [facility]   2/5/2020  9:00 AM Boston Rosenberg I, SLP MMCPTPB [facility]   2/5/2020 10:00 AM Mami Screen, PTA [facility]CPTPB [facility]   2/7/2020  9:00 AM Elkay Oscar BPWBNBL [facility]   2/10/2020  8:30 AM Coy Arcos MD [address]   2/10/2020  9:45 AM SILVERIO Cooper MMCPTPB [facility]   2/10/2020 11:00 AM Linette Oscar PXNAWNW [facility]   2/10/2020 12:00 PM Mami Screen, PTA MMCPTPB [facility]   2/11/2020  9:15 AM SILVERIO Cooper MMCPTPB [facility]   2/11/2020 10:00 AM Mami Screen, PTA MMCPTPB [facility]   2/11/2020 10:30 AM Linette Oscar UPDKYWI [facility]   2/14/2020 10:15 AM Donna Rowell MD [facility]   2/14/2020  1:15 PM Elkay Oscar WUFAKEJ [facility]   2/17/2020  8:00 AM Elkay Oscar DTUUPGY [facility]   2/17/2020  9:00 AM Mami Screen, PTA MMCPTPB [facility]   2/17/2020  9:45 AM SILVERIO Cooper YNUCIHO [facility]   2/19/2020  8:00 AM Elkay Oscar FENZLIL [facility]   2/19/2020  9:00 AM SILVERIO Puente OVHJTPC [facility]   2/19/2020 10:00 AM Mami Screen, PTA MMCPTPB [facility]   2/21/2020  9:00 AM Linette EL [facility]   2/24/2020  8:45 AM Linette WATKINS [facility]   2/24/2020  9:45 AM Boston Rosenberg I, SLP BNTAMOO [facility]   2/24/2020 10:30 AM Patricia Polk, PTA MMCPTPB [facility]   2/26/2020  8:00 AM Makenzie Roa EPDRNHZ [facility]   2/26/2020  9:00 AM Ricarda Lewis I, SLP ATXEAUV [facility]   2/26/2020 10:30 AM Renee Villalobos, PT LSZTKKY [facility]   2/28/2020  1:15 PM Makenzie Roa MMCPTPB [facility]

## 2020-01-13 ENCOUNTER — HOSPITAL ENCOUNTER (OUTPATIENT)
Dept: PHYSICAL THERAPY | Age: 65
Discharge: HOME | End: 2020-01-13
Payer: MEDICARE

## 2020-01-13 ENCOUNTER — APPOINTMENT (OUTPATIENT)
Dept: PHYSICAL THERAPY | Age: 65
End: 2020-01-13
Payer: MEDICARE

## 2020-01-13 PROCEDURE — 97112 NEUROMUSCULAR REEDUCATION: CPT

## 2020-01-13 PROCEDURE — 97110 THERAPEUTIC EXERCISES: CPT

## 2020-01-13 PROCEDURE — 92507 TX SP LANG VOICE COMM INDIV: CPT

## 2020-01-13 PROCEDURE — 92526 ORAL FUNCTION THERAPY: CPT

## 2020-01-13 PROCEDURE — 97530 THERAPEUTIC ACTIVITIES: CPT

## 2020-01-13 NOTE — PROGRESS NOTES
OT DAILY TREATMENT NOTE 10-18    Patient Name: Nora Venegas  Date:2020  : 1955  [x]  Patient  Verified  Payor: [place] MEDICARE / Plan: [place] MEDICARE PART A & B / Product Type: Medicare /    In time:1020  Out time:1059  Total Treatment Time (min): 39  Visit #: 4 of 12    Medicare/BCBS Only   Total Timed Codes (min):  39 1:1 Treatment Time:  39     Treatment Area: Upper extremity weakness [R29.898]  Cerebral infarction, unspecified [I63.9]    SUBJECTIVE  Pain Level (0-10 scale): 0/10  Any medication changes, allergies to medications, adverse drug reactions, diagnosis change, or new procedure performed?: [x] No    [] Yes (see summary sheet for update)  Subjective functional status/changes:   [] No changes reported  I tried to place the electrode on my back, but I couldn't reach     OBJECTIVE    24 min Therapeutic Exercise:  [] See flow sheet :   Rationale: increase ROM and increase strength to improve the patients ability to abduct and flex shoulder/elbow    AROM Elbow flexion/extension  AROM Shoulder flexion, seated  AROM Shoulder Abduction, seated   PROM Wrist/digits      15 min Neuromuscular Re-education:  []  See flow sheet :   Rationale: increase ROM and increase strength  to improve the patients ability to move Right UE    Seated Quick stretch for Sup/Pro  Quick stretch for wrist extension with wrist in gravity eliminated position   [facility] Shoulder flexion, protraction/retraction  Swiss Ball: B Shoulder Flexion, Horizontal abduction/adduction     With   [] TE   [] TA   [] neuro   [] other: Patient Education: [x] Review HEP    [] Progressed/Changed HEP based on:   [] positioning   [] body mechanics   [] transfers   [] heat/ice application   [] Splint wear/care   [] Sensory re-education   [] scar management      [] other:            Other Objective/Functional Measures:     Cueing to pace self and breathe  Cueing for movement patterns vs using trunk to \"throw\" arm      Pain Level (0-10 scale) post treatment: 0/10    ASSESSMENT/Changes in Function: ongoing concern for return of hand to PLOF    Patient will continue to benefit from skilled OT services to modify and progress therapeutic interventions, address ROM deficits, address strength deficits and instruct in home and community integration to attain remaining goals. []  See Plan of Care  []  See progress note/recertification  []  See Discharge Summary         Progress towards goals / Updated goals:  1.   Patient will be able to actively grasp and release light items ith right hand to assist with self care tasks  Current status-grasped sock ball x 1 1/8/20, not able to grasp PVC but not tightly enough to  move PVC on [place] 1/10/20  Status at last visit: Able to grasp sock ball today 1/6/20  2.  Patient will be able to actively flex shoulder to 45 degrees to improve ability to place arm through sleeve. Current status: REviewed NMESe 1/10/20  3.  Patient will be able to actively extend wrist to posiiton hand for grasp of items. Current status: ACtive extension not seen 1/8/20  New goal:   4.  .  Patient will robert able to actively abduct shoulder to put on deodorant.   Status last visit: Palpable middlle deltoid with poor strength 1/6/20, improved in supine 1/8/20, reviewed NMES    PLAN  []  Upgrade activities as tolerated     [x]  Continue plan of care  []  Update interventions per flow sheet        []  Discharge due to:_  []  Other:_      Trinity CarverANDERSON/L 1/13/2020  10:53 AM    Future Appointments   Date Time Provider Marc Ruvalcaba   1/13/2020 11:15 AM SILVERIO Nugent [id number] [facility]   1/13/2020 12:00 PM Greta Paul, PT MMCPTPB [facility]   1/15/2020  1:00 PM Chip Shi PTA MMCPTPB [facility]   1/15/2020  1:30 PM Monty Lockwood, SILVERIO DMYNACO [facility]   1/15/2020  2:30 PM MALLIKA Nagy/L MMCPTPB [facility]   1/17/2020  9:00 AM Burch Knee MOOSCGY [facility]   1/20/2020  9:45 AM SILVERIO Nugent CSJPFUG [facility]   1/20/2020 10:30 AM Greta Paul, PT MMCPTPB [facility]   1/20/2020 11:00 AM Bashir Terrazas ABTRUWL [facility]   1/22/2020 10:15 AM Levell Jossy, OTR/L MMCPTPB [facility]   1/22/2020 11:00 AM Omar Leys, PT MMCPTPB [facility]   1/22/2020 11:30 AM Chelsea Leigh I, SLP JDYATXR [facility]   1/24/2020  1:15 PM Levell Jossy, OTR/L MMCPTPB [facility]   1/27/2020  9:45 AM Chelsea Leigh I, SLP PRMBBLG [facility]   1/27/2020 10:30 AM Omar Leys, PT MMCPTPB [facility]   1/27/2020 11:00 AM Bashir Terrazas UNLNJVT [facility]   1/29/2020 10:45 AM Chelsea Leigh I, SLP JTNOBBS [facility]   1/29/2020 11:30 AM Hamilton Madden, PTA MMCPTPB [facility]   1/29/2020 12:30 PM Bashir Terrazas XQCFXSJ [facility]   1/31/2020  3:15 PM Bashir Terrazas ZFUOBWR [facility]   2/3/2020  8:45 AM Bashir Terrazas FCBPRAI [facility]   2/3/2020 10:00 AM Hamilton Madden, PTA MMCPTPB [facility]   2/3/2020 10:30 AM Chelsea Leigh I, SLP PWZDEJE [facility]   2/5/2020  8:00 AM Bashir Terrazas CMBSLMB [facility]   2/5/2020  9:00 AM Chelsea Leigh I, SLP MMCPTPB [facility]   2/5/2020 10:00 AM Hamilton Madden, PTA MMCPTPB [facility]   2/7/2020  9:00 AM Bashir Terrazas FKACSDU [facility]   2/10/2020  8:30 AM Nidhi Cardenas MD [facility]    2/10/2020  9:45 AM Tiki Joaquin, SLP IYLSCUV [facility]   2/10/2020 11:00 AM Bashir Terrazas WGHTHIV [facility]   2/10/2020 12:00 PM Hamilton Madden, PTA MMCPTPB [facility]   2/11/2020  9:15 AM Tiki Joaquin, SILVERIO WGLPEYX [facility]   2/11/2020 10:00 AM Hamilton Madden, PTA MMCPTPB [facility]   2/11/2020 10:30 AM Bashir Terrazas BQHEIZU [facility]   2/14/2020 10:15 AM Arian Mills MD [facility]   2/14/2020  1:15 PM Bashir Terrazas JJIAAAG [facility]   2/17/2020  8:00 AM Bashir Terrazas IZUWVXT [facility]   2/17/2020  9:00 AM Hamilton Madden, PTA MMCPTPB [facility]   2/17/2020  9:45 AM Tiki Joaquin, SILVERIO FZAGAVC [facility]   2/19/2020  8:00 AM Bashir Terrazas AEDVYYZ [facility]   2/19/2020  9:00 AM Chelsea Leigh I, SLP LKUHDQJ [facility]   2/19/2020 10:00 AM Hamilton Madden PTA MMCPTPB [facility]   2/21/2020  9:00 AM Bashir Terrazas BPCRGEO [facility]   2/24/2020  8:45 AM Bashir Terrazas MMCPTPB [facility] 2/24/2020  9:45 AM Eliza Valle I, SILVERIO TOLJVBX [facility]   2/24/2020 10:30 AM Carmela Guardado, PTA MMCPTPB [facility]   2/26/2020  8:00 AM Isabell Spurling GWHWIAR [facility]   2/26/2020  9:00 AM SILVERIO Noonan LTNIBKO [facility]   2/26/2020 10:30 AM Willi Parrish, PT XQJFKZD [facility]   2/28/2020  1:15 PM Isabell Spurling MMCPTPB [facility]

## 2020-01-13 NOTE — PROGRESS NOTES
PT DAILY TREATMENT NOTE 10-18    Patient Name: Kevin Cole  Date:2020  : 1955  [x]  Patient  Verified  Payor: [place] MEDICARE / Plan: [place] MEDICARE PART A & B / Product Type: Medicare /    In time:1208  Out time:1247  Total Treatment Time (min): 39  Visit #: 6 of 12    Medicare/BCBS Only   Total Timed Codes (min):  39 1:1 Treatment Time:  39       Treatment Area: Lower extremity weakness [R29.898]  Cerebral infarction, unspecified [I63.9]    SUBJECTIVE  Pain Level (0-10 scale): 0/10  Any medication changes, allergies to medications, adverse drug reactions, diagnosis change, or new procedure performed?: [x] No    [] Yes (see summary sheet for update)  Subjective functional status/changes:   [] No changes reported  Reports he is doing well, he is sometimes using his straight cane at home. OBJECTIVE  15 min Therapeutic Activity:  []  See flow sheet :   Rationale: increase ROM, increase strength and improve coordination  to improve the patients ability to ease with ADL's     24 min Neuromuscular Re-education:  []  See flow sheet :   Rationale: increase ROM, increase strength, improve coordination and improve balance  to improve the patients ability to ease wit ADL's          With   [] TE   [] TA   [] neuro   [] other: Patient Education: [x] Review HEP    [] Progressed/Changed HEP based on:   [] positioning   [] body mechanics   [] transfers   [] heat/ice application    [] other:      Other Objective/Functional Measures: attempted seated pinky disc with feet on/off floor while stacking cones across midline. Trunk perturbations on pinky disc. SL powder board knee lifts and leg swings   Ambulates with only SPC in clinic with cueing to increase weight bearing onto right LE. Leg press , single leg at 50#, 2x10 with assist to hold leg neutral.    Pain Level (0-10 scale) post treatment: 0/10    ASSESSMENT/Changes in Function: Progressing well with clinical challenges. Pt was fatigued  after session.  He is encouraged to walk at home with his [facility] and practice heel toe gait with increased weight shifting onto right LE. Patient will continue to benefit from skilled PT services to modify and progress therapeutic interventions, address functional mobility deficits, address ROM deficits, address strength deficits, analyze and address soft tissue restrictions, analyze and cue movement patterns, analyze and modify body mechanics/ergonomics, assess and modify postural abnormalities and address imbalance/dizziness to attain remaining goals. [x]  See Plan of Care  []  See progress note/recertification  []  See Discharge Summary         Progress towards goals / Updated goals:  1. Pt will ambulate 4 steps x 3 with 1 HR with supervision to be able to navigate stairs at home.   2. Pt will increase FOTO score by 7 points in order to demonstrate improved functional ability   3. Pt will increase right hamstring strength to 2+/5 in order to improve right foot clearance and normalize gait pattern for increased ambulatory tolerance and reduced fall risk.   4. Pt will demonstrate Romberg EC on a compliant surface for 15 seconds without LOB in order to demonstrate improved stability in poorly lit environments.   5. Pt will ambulate >150' with SPC and no evidence of instability in order to improve ease of household negotiation with LRAD. Progressing well. Pt is ambulating with SPC in clinic.  1/8/20    PLAN  [x]  Upgrade activities as tolerated     [x]  Continue plan of care  []  Update interventions per flow sheet       []  Discharge due to:_  []  Other:_      Madhavi Evans, PT 1/13/2020  12:50 PM    Future Appointments   Date Time Provider Marc Ruvalcaba   1/15/2020  1:00 PM Lisa Verma PTA MMCPTPB [facility]   1/15/2020  1:30 PM SILVERIO Loya KCFTVNP [facility]   1/15/2020  2:30 PM Melba Reed OTR/L MMCPTPB [facility]   1/17/2020  9:00 AM Karime Hays SSEWCAG [facility]   1/20/2020  9:45 AM SILVERIO LoyaPTPB [facility]   1/20/2020 10:30 AM Cale Benites, PT MMCPTPB [facility]   1/20/2020 11:00 AM Ludin Albert TKTCSLT [facility]   1/22/2020 10:15 AM Prudy Prophet, OTR/L MMCPTPB [facility]   1/22/2020 11:00 AM Cale Benites, PT MMCPTPB [facility]   1/22/2020 11:30 AM Torres Cueto I, SLP GVSDGGQ [facility]   1/24/2020  1:15 PM Prudy Prophet, OTR/L MMCPTPB [facility]   1/27/2020  9:45 AM Torres Cueto I, SLP QBFVREY [facility]   1/27/2020 10:30 AM Cale Benites, PT MMCPTPB [facility]   1/27/2020 11:00 AM Ludin Albert HURBOBBY [facility]   1/29/2020 10:45 AM Torres Cueto I, SLP ABFKHEF [facility]   1/29/2020 11:30 AM Deonte Clayton, PTA MMCPTPB [facility]   1/29/2020 12:30 PM Ludin Albert VPAAHNX [facility]   1/31/2020  3:15 PM Ludin Albert TGVXXSC [facility]   2/3/2020  8:45 AM Ludin Albert OLABIGA [facility]   2/3/2020 10:00 AM Deonte Clayton, PTA MMCPTPB [facility]   2/3/2020 10:30 AM Torres Cueto I, SLP NPNXIGY [facility]   2/5/2020  8:00 AM Ludin Albert BNNMLUD [facility]   2/5/2020  9:00 AM SILVERIO Ozuna MMCPTPB [facility]   2/5/2020 10:00 AM Deonte Clayton, LINDA MMCPTPB [facility]   2/7/2020  9:00 AM Ludin Albert GBQRKDG [facility]   2/10/2020  8:30 AM Mendel Rivers,  NATA [address]   2/10/2020  9:45 AM SILVERIO Ortiz MMCPTPB [facility]   2/10/2020 11:00 AM Ludin Albert RPQZHQO [facility]   2/10/2020 12:00 PM Deonte Clayton PTA MMCPTPB [facility]   2/11/2020  9:15 AM SILVERIO Ortiz MMCPTPB [facility]   2/11/2020 10:00 AM Deonte Clayton, LINDA MMCPTPB [facility]   2/11/2020 10:30 AM Ludin Albert MQMHSPR [facility]   2/14/2020 10:15 AM Flaco Moseley MD [facility]   2/14/2020  1:15 PM Ludin Albert RWUMRZA [facility]   2/17/2020  8:00 AM Ludin Albert HWPUXRG [facility]   2/17/2020  9:00 AM Deonte Clayton PTA MMCPTPB [facility]   2/17/2020  9:45 AM SILVERIO Ortiz AICGODP [facility]   2/19/2020  8:00 AM Ludin Albert MVMKNIS [facility]   2/19/2020  9:00 AM SILVERIO Ozuna MMCPTPB [facility]   2/19/2020 10:00 AM Deonte Clayton PTA MMCPTPB [facility]   2/21/2020  9:00 AM Ludin NGUYENSXWL [facility]   2/24/2020 8:45 AM Esa Reich AZYSKFJ [facility]   2/24/2020  9:45 AM Bebo Liz, SLP YVONRCT [facility]   2/24/2020 10:30 AM Jan Cho, LINDA MMCPTPB [facility]   2/26/2020  8:00 AM Esa Reich MBASXJZ [facility]   2/26/2020  9:00 AM Ksenia Pereira I, SLP GGVLGMH [facility]   2/26/2020 10:30 AM Richie Olvera, PT TRKYDZP [facility]   2/28/2020  1:15 PM Esa Reich MMCPTPB [facility]

## 2020-01-13 NOTE — PROGRESS NOTES
ST DAILY TREATMENT NOTE    Patient Name: Lydia Abdalla  Date:2020  : 1955  [x]  Patient  Verified  Payor: Payor: [place] MEDICARE / Plan: [place] MEDICARE PART A & B / Product Type: Medicare /   In time: 11:17  Out time:12:00  Total Treatment Time (min): 43  Visit #: 28 of 36    Treatment Diagnosis: Dysarthria and anarthria [R47.1] Oropharyngeal dysphagia [R13.12]    SUBJECTIVE  Pain Level (0-10 scale): 0  Any medication changes, allergies to medications, adverse drug reactions, diagnosis change, or new procedure performed?: [] No    [] Yes (see summary sheet for update)    Subjective functional status/changes:   [] No changes reported  \"With a flaky biscuit I was coughing some\"     OBJECTIVE  Treatment provided includes:  Increase/Improve:  []  Voice Quality []  Cognitive Linguistic Skills [x]  Laryngeal/Pharyngeal Exercises   []  Vocal Loudness []  Reading Comprehension [x]  Swallowing Skills    []  Vocal Cord Function []  Auditory Comprehension []  Oral Motor Skills   []  Resonance []  Writing Skills [x]  Compensatory strategies    [x]  Speech Intelligibility []  Expressive Language []  Attention   []  Breath Support/Coord. []  Receptive language []  Memory   [x]  Articulation []  Safety Awareness []    []  Fluency []  Word Retrieval []        Treatment Provided:  - oropharyngeal strengthening exercises   - utilizing compensatory strategies with po trials   - /s/ at word and phrase level   - unstructured conversation with use of compensatory strategies   - administration of the The [place] Dysarthria Assessment 2nd Edition (FDA-2) for re-assessment of dysarthria. Results are scored by a rating scale form from an A (normal function) to an E (no function) by 7 sections, including:  Reflexes, Respiration, lips, Palate, Laryngeal, Tongue, and Intelligibility. Results are as indicated:    Reflexes- Cough B Swallow B-C, Dribble/Drool A   Respiration: At Rest A , At Speech B   Lips:  At Rest A-B , Spread B , Seal A , Alternate A-B , In speech B   Palate: Fluids A , Maintenance A-B , In-Speech A   Laryngeal: Time A-B , Pitch B , Volume B ,  In speech A-B  ,  Tongue: At rest: A , Protrusion: B , Elevation:A-B , Lateral:A-B , Alternate:B , In Speech: B-C   Intelligibility: Words: B, Sentences: B, Conversation: B-C    Findings include mild to moderate limitations with all lingual and labial ROM/strength/ coordination. Pt denies c/o SOB at rest and has made functional gains in breath support/coordination with voicing tasks and in conversation. He was able to sustain a vowel for ~ 14 seconds. Improved control with pitch and volume changes with mild deviations. He has demonstrated progress in speech intelligibility at the word and sentence level, however continues to require cueing to slow rate, increase volume, and over-articulate to increase clarity at the conversational level. Overall, his speech is judged at ~85% intelligibility to a trained ear with mild-moderate distortions/ omissions and has to repeat himself occasionally. He presents mild dysarthric speech characterized by monotone speech, imprecise articulation, slow speech rate, and blurred word boundaries. Patient/Caregiver  Education: [x] Review HEP      HEP/Handouts given: continue oropharyngeal strengthening exercises. Work on /s/ productions in sentences     Pain Level (0-10 scale) post treatment: 0    ASSESSMENT   Pt is progressing gradually in treatment.  FDA-R reveals improvement in labial/lingual strength and coordination and improved intelligibility   []   Improving appropriately and progressing toward goals  [x]   Improving slowly and progressing toward goals  []   Approximating goals/maximum potential  [x]   Continues to benefit from skilled therapy to address remaining functional deficits  []   Not progressing toward goals and plan of care will be adjusted    Patient will continue to benefit from skilled therapy to address remaining functional deficits: dysphagia     Progress towards goals / Updated goals:  1. The pt will increase quality and length of phonation by sustaining ah utilizing effective diaphragmatic breath support for >15 seconds when provided with min-modA in 3/3 sessions to increase functional speech intelligibility. 1/13/20: trial (1) 15 seconds ; trial (2) 14 seconds      2. The patient will articulate (s/z) consonants at the word level- phrases level with 90% acc with use of comp strategies and OMES  to improve speech intelligibility to > 90% accuracy when provided with min cues. 1/13/20: at the word and phrase level:  /s/ with 90% with min cues      3. The patient will articulate  (l, voiced /th/ consonants at the sentence to conversational level  with 90% acc with use of compensatory strategies and OMES  to improve speech intelligibility to > 90% acc when provided with min cues. 1/13/20: not addressed       4.  Pt will demonstrate adequate vocal intensity in varying levels from whisper to yelling with 85% accuracy in structured tasks at the sentence to conversational level when provided with min cues.    1/13/20: min-mod cues at the conversational level informally measured at ~80% accuracy with cueing       5. Patient will complete tongue base retraction,and laryngeal/pharyngeal strengthening exercises (including Sheyla maneuver, Mendelsohn maneuver, modified Shaker with CTAR ball, hard /k/ in isolation,  effortful swallow, etc.), with min cues in 5/5 sessions in order to improve the strength/agility/efficiency of his swallow for improved swallowing safety and QOL. 1/13/20: progressing: modified shaker rapid repetitions x30, sheyla x20 with supervision     6.  Patient will recall/demonstrate aspiration precautions and safe swallowing strategies with 100% acc when provided with occasional cues in 5/5 sessions (small sips/bites; chin tuck with all liquid intake; alternate liquids/solids; slow rate; NO straws, Sit upright at 90 degree angle during PO trials)  to decrease the reported incidences of dysphagia and s/sx of aspiration.   1/13/20:  progressing: tolerates 3oz of thin liquids with min cues for compensatory strategies     PLAN  [x]  Continue plan of care  []  Modify Goals/Treatment Plan      []  Discharge due to:  [] Other:    Samul Cogan, SLP 1/13/2020  11:21 AM    Future Appointments   Date Time Provider Marc Ruvalcaba   1/13/2020 12:00 PM Elizabeth Hackett, PT MMCPTPB [facility]   1/15/2020  1:00 PM Christine Bach PTA MMCPTPB [facility]   1/15/2020  1:30 PM SILVERIO Arrington MMCPTPB [facility]   1/15/2020  2:30 PM Janice Morris, OTR/L MMCPTPB [facility]   1/17/2020  9:00 AM Benny Ventura IGDYSNA [facility]   1/20/2020  9:45 AM SILVERIO Arrington MMCPTPB [facility]   1/20/2020 10:30 AM Elizabeth Hackett PT MMCPTPB [facility]   1/20/2020 11:00 AM Benny Ventura GNVWIWQ [facility]   1/22/2020 10:15 AM Janice Morris, OTR/L MMCPTPB [facility]   1/22/2020 11:00 AM Elizabeth Hackett PT MMCPTPB [facility]   1/22/2020 11:30 AM Elvia Rivero SLP FTYDCRE [facility]   1/24/2020  1:15 PM Janice Morris, OTR/L MMCPTPB [facility]   1/27/2020  9:45 AM Elvia Rivero SLP QQMABXR [facility]   1/27/2020 10:30 AM Elizabeth Hackett, PT MMCPTPB [facility]   1/27/2020 11:00 AM Benny Ventura YCAQXNU [facility]   1/29/2020 10:45 AM Elvia Rivero, SLP QZKECLL [facility]   1/29/2020 11:30 AM Christine Bach, PTA MMCPTPB [facility]   1/29/2020 12:30 PM Zapata Titus IUNOQRW [facility]   1/31/2020  3:15 PM Zapata Audrey CSZXTYN [facility]   2/3/2020  8:45 AM Benny Ventura FWWZGLG [facility]   2/3/2020 10:00 AM Christine Bach, PTA MMCPTPB [facility]   2/3/2020 10:30 AM Elvia Rivero SLP JVOTXBA [facility]   2/5/2020  8:00 AM Benny Ventura IMJXRJW [facility]   2/5/2020  9:00 AM Kathi Schreiber I, SLP MMCPTPB [facility]   2/5/2020 10:00 AM Christine Bach, PTA MMCPTPB [facility]   2/7/2020  9:00 AM Benny Ventura FIYYHJZ [facility]   2/10/2020  8:30 AM Duane Zimmerman MD [address]   2/10/2020  9:45 AM Elvia Rivero, SLP KIXOAKX [facility]   2/10/2020 11:00 AM Benny Ventura QSQLXON [facility]   2/10/2020 12:00 PM Carmela Guardado, PTA MMCPTPB [facility]   2/11/2020  9:15 AM SILVERIO Clement MMCPTIMTIAZ [facility]   2/11/2020 10:00 AM Carmela Guardado, PTA MMCPTPB [facility]   2/11/2020 10:30 AM Rina Spurling GMKJVKM [facility]   2/14/2020 10:15 AM Sheyla Carreno MD [facility]   2/14/2020  1:15 PM Rina Spurling KRLPXYI [facility]   2/17/2020  8:00 AM Rina Spurling DUYLYWL [facility]   2/17/2020  9:00 AM Carmela Guardado PTA MMCPTPB [facility]   2/17/2020  9:45 AM SILVERIO Clement DKDDLHA [facility]   2/19/2020  8:00 AM Rinall Spurling NOOEFLM [facility]   2/19/2020  9:00 AM SILVERIO Noonan GGYJKPJ [facility]   2/19/2020 10:00 AM Carmela Guardado PTA MMCPTPB [facility]   2/21/2020  9:00 AM Rina Spurling QEEPSUL [facility]   2/24/2020  8:45 AM Rina Spurling MMCPTPB [facility]   2/24/2020  9:45 AM SILVERIO Clement YVYGZZE [facility]   2/24/2020 10:30 AM Carmela Guardado, PTA MMCPTPB [facility]   2/26/2020  8:00 AM Rina Spurhardik THSPRIQ [facility]   2/26/2020  9:00 AM Eliza Valle I, SLP UMTVUEM [facility]   2/26/2020 10:30 AM Willi Parrish, PT EJSJUVS [facility]   2/28/2020  1:15 PM Isabell Spurling [id number] [facility]

## 2020-01-15 ENCOUNTER — HOSPITAL ENCOUNTER (OUTPATIENT)
Dept: PHYSICAL THERAPY | Age: 65
Discharge: HOME | End: 2020-01-15
Payer: MEDICARE

## 2020-01-15 PROCEDURE — 97110 THERAPEUTIC EXERCISES: CPT

## 2020-01-15 PROCEDURE — 92507 TX SP LANG VOICE COMM INDIV: CPT

## 2020-01-15 PROCEDURE — 97530 THERAPEUTIC ACTIVITIES: CPT

## 2020-01-15 PROCEDURE — 97112 NEUROMUSCULAR REEDUCATION: CPT

## 2020-01-15 PROCEDURE — 92526 ORAL FUNCTION THERAPY: CPT

## 2020-01-15 NOTE — PROGRESS NOTES
PT DAILY TREATMENT NOTE 10-18    Patient Name: Cristiane Gresham  Date:1/15/2020  : 1955  [x]  Patient  Verified  Payor: [place] MEDICARE / Plan: VA MEDICARE PART A & B / Product Type: Medicare /    In time:12:56  Out time:1:30  Total Treatment Time (min): 34  Visit #: 7 of 12    Medicare/BCBS Only   Total Timed Codes (min):  34 1:1 Treatment Time:  34       Treatment Area: Lower extremity weakness [R29.898]  Cerebral infarction, unspecified [I63.9]    SUBJECTIVE  Pain Level (0-10 scale): 0/10  Any medication changes, allergies to medications, adverse drug reactions, diagnosis change, or new procedure performed?: [x] No    [] Yes (see summary sheet for update)  Subjective functional status/changes:   [] No changes reported  . Pt reports he is walking with the cane some at home but doesn't feel overly confident with it yet. He is having a hard time lying on the bed on his stomach to bend his leg because it is so soft.      OBJECTIVE  24 min Therapeutic Exercise:  [x]  See flow sheet :   Rationale: increase ROM, increase strength and improve coordination  to improve the patients ability to ease with ADL's     10 min Neuromuscular Re-education:  [x]  See flow sheet :   Rationale: increase ROM, increase strength, improve coordination and improve balance  to improve the patients ability to ease wit ADL's          With   [] TE   [] TA   [] neuro   [] other: Patient Education: [x] Review HEP    [] Progressed/Changed HEP based on:   [] positioning   [] body mechanics   [] transfers   [] heat/ice application    [] other:      Other Objective/Functional Measures:   Educated on seated heel slides for strengthening instead of prone [facility]   /72  HR 49 bpm  Worked on quick stretch foot DF/PF with knee extended and bent to strengthen and facilitate for driving  Educated on increased left step length with SPC; improved soft knee at midstance for walking around clinic  Sit to stands with right leg back was challenging but pt able to perform      Pain Level (0-10 scale) post treatment: 0/10    ASSESSMENT/Changes in Function: Pt progressing in therapy towards use of SPC versus quad cane for ambulation. He continues with decreased hamstring strength needed for better foot clearance during swing phase of gait. He has improved ease of sit to stand transfers with increased right weight shift. Will continue to progress ambulation and dynamic gait for safety and ease of household and community ambulation. Progress towards goals / Updated goals:  1. Pt will ambulate 4 steps x 3 with 1 HR with supervision to be able to navigate stairs at home.   2. Pt will increase FOTO score by 7 points in order to demonstrate improved functional ability   3. Pt will increase right hamstring strength to 2+/5 in order to improve right foot clearance and normalize gait pattern for increased ambulatory tolerance and reduced fall risk.   4. Pt will demonstrate Romberg EC on a compliant surface for 15 seconds without LOB in order to demonstrate improved stability in poorly lit environments.   5. Pt will ambulate >150' with SPC and no evidence of instability in order to improve ease of household negotiation with LRAD. Progressing well. Pt is ambulating with SPC in clinic.  1/8/20    PLAN  [x]  Upgrade activities as tolerated     [x]  Continue plan of care  []  Update interventions per flow sheet       []  Discharge due to:_  []  Other:_      Sheilda Castleman, PTA 1/15/2020  12:50 PM    Future Appointments   Date Time Provider Marc Ruvalcaba   1/15/2020  1:00 PM Hamilton Madden PTA MMCPTPB [facility]   1/15/2020  1:30 PM SILVERIO Worthington CTEHXWH [facility]   1/15/2020  2:30 PM Heather Fenton OTR/L MMCPTPB [facility]   1/17/2020  9:00 AM Bashir Terrazas PPANIGL [facility]   1/20/2020  9:45 AM SILVERIO Worthington ICDEZNQ [facility]   1/20/2020 10:30 AM Omar Chaidez PT MMCPTPB [facility]   1/20/2020 11:00 AM Bashir Terrazas OTBBVXZ [facility]   1/22/2020 10:15 AM Heather Fenton, OTR/L MMCPTPB [facility]   1/22/2020 11:00 AM Jamal Luna, PT MMCPTPB [facility]   1/22/2020 11:30 AM Xiang Calhoun I, SLP KYDJUNT [facility]   1/24/2020  1:15 PM Merton Kanner, OTR/L MMCPTPB [facility]   1/27/2020  9:45 AM Tete Louis, SLP MMCPTPB [facility]   1/27/2020 10:30 AM Jamal Luna, PT MMCPTPB [facility]   1/27/2020 11:00 AM Raoul Gillette XQJHQFN [facility]   1/29/2020 10:45 AM Tete Louis, SLP MMCPTPB [facility]   1/29/2020 11:30 AM Jesi Reyes, PTA MMCPTPB [facility]   1/29/2020 12:30 PM Raoul Gillette ZWEXYGZ [facility]   1/31/2020  3:15 PM Raoul Gillette YZYFDBI [facility]   2/3/2020  8:45 AM Raoul Gillette XQVGGWH [facility]   2/3/2020 10:00 AM Jesi Reyes PTA MMCPTPB [facility]   2/3/2020 10:30 AM Xiang Calhoun I, SLP JCGKGGW [facility]   2/5/2020  8:00 AM Raoul Gillette SYUWYHK [facility]   2/5/2020  9:00 AM Xiang Calhoun I, SLP MMCPTPB [facility]   2/5/2020 10:00 AM Jesi Reyes PTA MMCPTPB [facility]   2/7/2020  9:00 AM Raoul Gillette TRHFTHY [facility]   2/10/2020  8:30 AM Darby Madden MD [facility]    2/10/2020  9:45 AM Tete Louis, SLP KBWUQUZ [facility]   2/10/2020 11:00 AM Raoul Gillette INOYCDW [facility]   2/10/2020 12:00 PM Jesi Reyes PTA MMCPTPB [facility]   2/11/2020  9:15 AM Tete Louis, SILVERIO [facility]PEYTF [facility]   2/11/2020 10:00 AM Jesi Reyes PTA MMCPTPB [facility]   2/11/2020 10:30 AM Raoul Gillette KAEERDX [facility]   2/14/2020 10:15 AM Reyna Marie MD [facility]   2/14/2020  1:15 PM Raoul Gillette CUDGDXQ [facility]   2/17/2020  8:00 AM Raoul Gillette HBMCPRO [facility]   2/17/2020  9:00 AM Jesi Reyes PTA MMCPTPB [facility]   2/17/2020  9:45 AM Tete Louis, SLP QKCDWKR [facility]   2/19/2020  8:00 AM Raoul Gillette NZNTHBO [facility]   2/19/2020  9:00 AM SILVERIO Grissom LEVLFOL [facility]   2/19/2020 10:00 AM Jesi Reyes PTA [facility]CPTPB [facility]   2/21/2020  9:00 AM Raoul Gillette OMAAGSW [facility]   2/24/2020  8:45 AM Raoul Gillette YFRRLJR [facility]   2/24/2020  9:45 AM SILVERIO Grissom MMCPTPB [facility]   2/24/2020 10:30 AM Jesi Reyes PTA CZEGCYZ [facility]   2/26/2020  8:00 AM Dee Dee Norman HGHOHVR [facility]   2/26/2020  9:00 AM Sarah Winkler I, SLP EVQWGLY [facility]   2/26/2020 10:30 AM Sherrie Schlatter, PT FNVYGFX [facility]   2/28/2020  1:15 PM Dee Dee Norman MMCPTPB [facility]

## 2020-01-15 NOTE — PROGRESS NOTES
OT DAILY TREATMENT NOTE 10-18    Patient Name: Radha Number  Date:1/15/2020  : 1955  [x]  Patient  Verified  Payor: [place] MEDICARE / Plan: [place] MEDICARE PART A & B / Product Type: Medicare /    In time:215  Out time:257  Total Treatment Time (min): 42  Visit #: 5 of 12    Medicare/BCBS Only   Total Timed Codes (min):  42 1:1 Treatment Time:  42     Treatment Area: Upper extremity weakness [R29.898]  Cerebral infarction, unspecified [I63.9]    SUBJECTIVE  Pain Level (0-10 scale): 0/10  Any medication changes, allergies to medications, adverse drug reactions, diagnosis change, or new procedure performed?: [x] No    [] Yes (see summary sheet for update)  Subjective functional status/changes:   [] No changes reported  I can't reach to put the electrode on my shoulder, my wife doesn't help me. Patient reports arm is tired today. Patient aware that OT will be slowly decreasing from 3 to 2x a week. Patient voiced understanding.     OBJECTIVE    12 min Therapeutic Exercise:  [] See flow sheet :   Rationale: increase ROM and increase strength to improve the patients ability to move Right UE    AROM elbow flex ext x 10  AROM shoulder flex in sitting right  AROM shoulder abd in sitting right  PROm wrist and hand right     10 min Neuromuscular Re-education:  []  See flow sheet :   Rationale: increase ROM, increase strength and improve coordination  to improve the patients ability to move right UE freely    Seated Elbow flex/ext  Recip sup/pro with quick stretch       20 min Therapeutic Activity:  []  See flow sheet :   Rationale: increase ROM and improve coordination  to improve the patients ability to grasp    Grasp and release socks, standing, 3x with assist by ANDERSON  Education provided to Patient spouse on electrode placement for abduction/flexion        With   [] TE   [] TA   [] neuro   [] other: Patient Education: [x] Review HEP    [] Progressed/Changed HEP based on:   [] positioning   [] body mechanics   [] transfers   [] heat/ice application   [] Splint wear/care   [] Sensory re-education   [] scar management      [] other:            Other Objective/Functional Measures:     Cueing for breathe control  Cueing to use Right UE vs swinging trunk  Increased tremors today      Pain Level (0-10 scale) post treatment: 0/10    ASSESSMENT/Changes in Function: Unable to actively extend wrist     Patient will continue to benefit from skilled OT services to modify and progress therapeutic interventions, address ROM deficits, address strength deficits and instruct in home and community integration to attain remaining goals. []  See Plan of Care  []  See progress note/recertification  []  See Discharge Summary         Progress towards goals / Updated goals:  1.   Patient will be able to actively grasp and release light items ith right hand to assist with self care tasks  Current status-grasped sock ball x 1 1/8/20, not able to grasp PVC but not tightly enough to  move PVC on [place] 1/10/20  Status at last visit: Able to grasp sock ball today 1/6/20  2.  Patient will be able to actively flex shoulder to 45 degrees to improve ability to place arm through sleeve. Current status: REviewed NMESe 1/10/20  3.  Patient will be able to actively extend wrist to posiiton hand for grasp of items. Current status: ACtive extension not seen 1/8/20  New goal:   4.  .  Patient will robert able to actively abduct shoulder to put on deodorant.   Status last visit: Palpable middlle deltoid with poor strength 1/6/20, improved in supine 1/8/20, reviewed NMES    PLAN  []  Upgrade activities as tolerated     [x]  Continue plan of care  []  Update interventions per flow sheet       []  Discharge due to:_  []  Other:_      JUSTIN Rivera/L 1/15/2020  1:12 PM    Future Appointments   Date Time Provider Marc Ruvalcaba   1/15/2020  1:30 PM SILVERIO Stinson MMCPTPB [address]   1/15/2020  2:15 PM No Kumar WCZCRQR [address]   1/17/2020  9:00 AM Lilly Cortés ISMPSDE [facility]   1/20/2020  9:45 AM Ignacia Sanchez, SLP MMCPTPB [facility]   1/20/2020 10:30 AM Natalya Argueta, PT MMCPTPB [email or web address]   1/20/2020 11:00 AM Lilly Cortés [id number] [facility]   1/22/2020 10:15 AM Rayo Wilson, OTR/L MMCPTPB [facility]   1/22/2020 11:00 AM Natalya Argueta, PT MMCPTPB [facility]   1/22/2020 11:30 AM Eli Bhatt I, SLP MMCPTPB [facility]   1/24/2020  1:15 PM Rayo Wilson, OTR/L MMCPTPB [facility]   1/27/2020  9:45 AM Eli Bhatt I, SLP BERETZZ [facility]   1/27/2020 10:30 AM Natlaya Argueta, PT MMCPTPB [facility]   1/27/2020 11:00 AM Lilly Cortés AASABGH [facility]   1/29/2020 10:45 AM Marlysdon Muñizbles I, SLP OPWAXRZ [facility]   1/29/2020 11:30 AM Hunter Amin, PTA MMCPTPB [email or web address]   1/29/2020 12:30 PM Lilly Cortés [id number] [facility]   1/31/2020  3:15 PM Lilly Cortés PNDVHOW [facility]   2/3/2020  8:45 AM Lilly Cortés CPPYPSD [facility]   2/3/2020 10:00 AM Hunter Amin, PTA MMCPTPB [facility]   2/3/2020 10:30 AM Eli Bhatt I, SLP FNNCVVZ [facility]   2/5/2020  8:00 AM Lilly Cortés VLAMFJX [facility]   2/5/2020  9:00 AM Eli Bhatt I, SLP MMCPTPB [facility]   2/5/2020 10:00 AM Hunter Amin, PTA MMCPTPB [facility]   2/7/2020  9:00 AM Lilly Cortés CCKTLBB [facility]   2/10/2020  8:30 AM Carlos Vasquez MD [address] [place] [address]   2/10/2020  9:45 AM Ignacia Sanchez, SILVERIO MMCPTPB [facility]   2/10/2020 11:00 AM Lilly Cortés OUWMAEF [facility]   2/10/2020 12:00 PM Hunter Amin, PTA MMCPTPB [facility]   2/11/2020  9:15 AM Ignacia Sanchez, SILVERIO MMCPTPB [email or web address]   2/11/2020 10:00 AM Hunter Amin, PTA MMCPTPB [facility]   2/11/2020 10:30 AM Lilly Cortés [id number] [facility]   2/14/2020 10:15 AM Charly Quesada MD [facility]   2/14/2020  1:15 PM Lilly Cortés SKLRPGY [facility]   2/17/2020  8:00 AM Lilly Cortés MMCPTPB [email or web address]   2/17/2020  9:00 AM Hunter Amin, PTA MMCPTPB [facility]   2/17/2020  9:45 AM Ignacia Sanchez, SLP BCYUNPU [facility]   2/19/2020  8:00 AM Lilly Cortés PNSCLLX [facility]   2/19/2020  9:00 AM SILVERIO Green MMCPTPB [facility]   2/19/2020 10:00 AM Hunter Amin LINDA MMCPTPB [facility]   2/21/2020  9:00 AM Columba Calvillo KGZDFIQ [facility]   2/24/2020  8:45 AM Columba Calvillo KWVHGNK [facility]   2/24/2020  9:45 AM Maeola Kawasaki, SLP LXVQJYS [facility]   2/24/2020 10:30 AM Yuly Branch, PTA MMCPTPB [facility]   2/26/2020  8:00 AM Columba Calvillo SBQGXSK [facility]   2/26/2020  9:00 AM Carlos Bazan I, SLP HMPGKSH [facility]   2/26/2020 10:30 AM Keely Pham, PT UAJLIYX [facility]   2/28/2020  1:15 PM Columba Calvillo MMCPTPB [facility]

## 2020-01-15 NOTE — PROGRESS NOTES
ST DAILY TREATMENT NOTE    Patient Name: Matthew Payne  Date:1/15/2020  : 1955  [x]  Patient  Verified  Payor: Payor: [place] MEDICARE / Plan: [place] MEDICARE PART A & B / Product Type: Medicare /   In time: 1:35  Out time: 2:15  Total Treatment Time (min): 40  Visit #: 29 of 36    Treatment Diagnosis: Dysarthria and anarthria [R47.1] Oropharyngeal dysphagia [R13.12]    SUBJECTIVE  Pain Level (0-10 scale): 0   Any medication changes, allergies to medications, adverse drug reactions, diagnosis change, or new procedure performed?: [x] No    [] Yes (see summary sheet for update)    Subjective functional status/changes:   [x] No changes reported  'I've been taking larger sips and doing a few small swallows\"    OBJECTIVE  Treatment provided includes:  Increase/Improve:  []  Voice Quality []  Cognitive Linguistic Skills [x]  Laryngeal/Pharyngeal Exercises   [x]  Vocal Loudness []  Reading Comprehension [x]  Swallowing Skills    []  Vocal Cord Function []  Auditory Comprehension []  Oral Motor Skills   []  Resonance []  Writing Skills [x]  Compensatory strategies    [x]  Speech Intelligibility []  Expressive Language []  Attention   [x]  Breath Support/Coord. []  Receptive language []  Memory   []  Articulation []  Safety Awareness []    []  Fluency []  Word Retrieval []        Treatment Provided:  - trials of po (thin liquids and solids) using compensatory strategies   - oropharyngeal strengthening exercises.   - vowel prolongations with cueing for breath support /coordination   - /s/ and /z/ in phrases   - pt education regarding updated MBS purpose and benefit & taking small isolated cup sips     Patient/Caregiver  Education: [x] Review HEP      HEP/Handouts given: continue HEP oropharyngeal strengthening and utilizing compensatory speech strategies at the conversational level     Pain Level (0-10 scale) post treatment: 0    ASSESSMENT   See re-certification   []   Improving appropriately and progressing toward goals  [x]   Improving slowly and progressing toward goals  []   Approximating goals/maximum potential  [x]   Continues to benefit from skilled therapy to address remaining functional deficits  []   Not progressing toward goals and plan of care will be adjusted    Patient will continue to benefit from skilled therapy to address remaining functional deficits: dysphagia     Progress towards goals / Updated goals:  1. The pt will increase quality and length of phonation by sustaining ah utilizing effective diaphragmatic breath support for >15 seconds when provided with min-modA in 3/3 sessions to increase functional speech intelligibility. 1/15/20:  difficulty exceeding 15 seconds this date 25% of trials   2. The patient will articulate (s/z) consonants at the word level- phrases level with 90% acc with use of comp strategies and OMES  to improve speech intelligibility to > 90% accuracy when provided with min cues. 1/15/20: at the word and phrase level:  /s/ 95% accuracy, /z/ with 90% with min cues   3. The patient will articulate  (l, voiced /th/ consonants at the sentence to conversational level  with 90% acc with use of compensatory strategies and OMES  to improve speech intelligibility to > 90% acc when provided with min cues. 1/15/20: not addressed    4.  Pt will demonstrate adequate vocal intensity in varying levels from whisper to yelling with 85% accuracy in structured tasks at the sentence to conversational level when provided with min cues.    1/15/20: min-mod cues at the conversational level informally measured at ~80% accuracy with cueing    5. Patient will complete tongue base retraction,and laryngeal/pharyngeal strengthening exercises (including Sheyla maneuver, Mendelsohn maneuver, modified Shaker with CTAR ball, hard /k/ in isolation,  effortful swallow, etc.), with min cues in 5/5 sessions in order to improve the strength/agility/efficiency of his swallow for improved swallowing safety and QOL.  1/15/20: progressing: modified shaker rapid repetitions x35 min cues, mendelsohn maneuver x20 with min cues   6.  Patient will recall/demonstrate aspiration precautions and safe swallowing strategies with 100% acc when provided with occasional cues in 5/5 sessions (small sips/bites; chin tuck with all liquid intake; alternate liquids/solids; slow rate; NO straws, Sit upright at 90 degree angle during PO trials)  to decrease the reported incidences of dysphagia and s/sx of aspiration.   1/15/20: met: tolerates 6oz of thin liquids and 5/5 solid trials with min cues for compensatory strategies       PLAN  [x]  Continue plan of care  []  Modify Goals/Treatment Plan      []  Discharge due to:  [] Other:    SILVERIO Garg 1/15/2020  1:38 PM    Future Appointments   Date Time Provider Marc Ruvalcaba   1/15/2020  2:15 PM Bashir Terrazas AULTGJN [address]   1/17/2020  9:00 AM Bashir Terrazas JRUZJYQ [address]   1/20/2020  9:45 AM Tikiindra Joaquin, SLP MMCPTPB [address]   1/20/2020 10:30 AM Omar Leys, PT MMCPTPB [address]   1/20/2020 11:00 AM Bashir Terrazas MQCTVML [address]   1/22/2020 10:15 AM Levell Jossy, OTR/L MMCPTPB [address]   1/22/2020 11:00 AM Omar Leys, PT MTOFFLR [address]   1/22/2020 11:30 AM Chelsea Leigh I SLP MMCPTPB [address]   1/24/2020  1:15 PM Levell Jossy, OTR/L MMCPTPB [address]   1/27/2020  9:45 AM Tiki Pethyacinth, SLP MMCPTPB [address]   1/27/2020 10:30 AM Omar Leys, PT MMCPTPB [address]   1/27/2020 11:00 AM Bashir Terrazas WVIMYEZ [address]   1/29/2020 10:45 AM Tiki Pethyacinth, SLP MMCPTPB [address]   1/29/2020 11:30 AM Hamilton Madden, PTA MMCPTPB [address]   1/29/2020 12:30 PM Bashir Terrazas KXQLCTR [address]   1/31/2020  3:15 PM Bashir Terrazas SNLTDWK [address]   2/3/2020  8:45 AM Bashir Terrazas CCKATVR [address]   2/3/2020 10:00 AM Hamilton Madden, PTA MMCPTPB [address]   2/3/2020 10:30 AM SILVERIO Holliday MMCPTPB [address]   2/5/2020  8:00 AM Bashir Terrazas HKGTWEJ [address]   2/5/2020  9:00 AM SILVERIO Holliday MMCPTPB [address]   2/5/2020 10:00 AM Bipin Nolen PTA MMCPTPB [facility]   2/7/2020  9:00 AM Elissa Flatness [facility]TPB [facility]   2/10/2020  8:30 AM Rosario Garcia MD [address]   2/10/2020  9:45 AM SILVERIO Olivares OMNUBVU [facility]   2/10/2020 11:00 AM Elissa Flatness EWHGRPR [facility]   2/10/2020 12:00 PM Bipin Nolen PTA [facility]CPTPB [facility]   2/11/2020  9:15 AM SILVERIO Olivares [id number] [facility]   2/11/2020 10:00 AM Bipin Nolen PTA MMCPTPB [facility]   2/11/2020 10:30 AM Elissa Flatness HKHUPUG [facility]   2/14/2020 10:15 AM Pierre Quesada MD [facility]   2/14/2020  1:15 PM Elissa Flatness NGRLNRB [facility]   2/17/2020  8:00 AM Elissa Flatness ZDJPFAJ [facility]   2/17/2020  9:00 AM Bipin Nolen PTA MMCPTPB [facility]   2/17/2020  9:45 AM SILVERIO Olivares HLYWPKF [facility]   2/19/2020  8:00 AM Elissa Flatness REEHGSK [facility]   2/19/2020  9:00 AM SILVERIO Cole ZXPCYWA [facility]   2/19/2020 10:00 AM Bipin Nolen PTA MMCPTPB [facility]   2/21/2020  9:00 AM Elissa Flatness OZWNSRF [facility]   2/24/2020  8:45 AM Elissa Flatness MMCPTPB [facility]   2/24/2020  9:45 AM Breann Hurst, SILVERIO WTXYWKK [facility]   2/24/2020 10:30 AM Bipin Nolen, PTA MMCPTPB [facility]   2/26/2020  8:00 AM Elissa Flatness BKSAXEF [facility]   2/26/2020  9:00 AM Octavio Kemp I, SLP [id number] [facility]   2/26/2020 10:30 AM Zulema Haile, PT AFHELQQ [facility]   2/28/2020  1:15 PM Elissa Flatness VHNWSFY [facility]

## 2020-01-15 NOTE — PROGRESS NOTES
[facility] [place] [employer] [facility]  [address]  [phone] fax    Continued Plan of Care/ Re-certification for Speech Therapy Services    Patient name: Deon Aldana Keyes  of Care: 10/23/2019   Referral source: Uma Crockett NP : 1955               Medical Diagnosis: Cerebral infarction, unspecified [I63.9]  Payor: [place] MEDICARE / Plan: [place] MEDICARE PART A & B / Product Type: Medicare /  Onset Date:2019               Treatment Diagnosis: R13.12 Oropharyngeal dysphagia   Prior Hospitalization: see medical history Provider#: [id number]   Medications: Verified on Patient summary List    Comorbidities: Acute Ischemic Stroke (acute/early subacute infarct at the L posterior basal ganglia to corona radiata with residual R hemiparesis), Dysphagia, Dysarthria, Diabetes, Obstructive Sleep Apnea on CPAP, HBP, Kidney Disease  Prior Level of Function: Speech-language, swallowing, cognition, and voice WNL; regular diet with thin liquids    Visits from Start of Care: 29    Missed Visits: 0    The Plan of Care and following information is based on the patient's current status:  Goal: 1. The pt will increase quality and length of phonation by sustaining ah utilizing effective diaphragmatic breath support for >15 seconds when provided with min-modA in 3/3 sessions to increase functional speech intelligibility. Status at last note/certification: established previous reporting period   Current Status: not met, progressing gradually: range 10-14 seconds     Goal: 2. The patient will articulate (s/z) consonants at the word level- phrases level with 90% acc with use of comp strategies and OMES  to improve speech intelligibility to > 90% accuracy when provided with min cues.   Status at last note/certification: Not met, progressing: /s/ in phrases with 85% Michael increasing to 100% with min-modA  Current Status: met: at the word and phrase level:  /s/ 95% accuracy, /z/ with 90% with min cues - increase complexity for carry over at the sentence and conversational level     Goal: 3. The patient will articulate  (l, voiced /th/ consonants at the sentence to conversational level  with 90% acc with use of compensatory strategies and OMES  to improve speech intelligibility to > 90% acc when provided with min cues. Status at last note/certification: established previous reporting period   Current Status: not met, progressing: /th/ with 90% accuracy with Michael in sentences ; /l/ with 80% accuracy with Michael in sentences. Gradual carry over at the conversational level with increase self corrections and monitoring     Goal: 4.  Pt will demonstrate adequate vocal intensity in varying levels from whisper to yelling with 85% accuracy in structured tasks at the sentence to conversational Littie Pore provided with min cues  Status at last note/certification: established previous reporting period   Current Status: not met, progressing: min-mod cues at the conversational level informally measured at ~80% accuracy with cueing      Goal: 5. Patient will complete tongue base retraction,and laryngeal/pharyngeal strengthening exercises (including Sheyla maneuver, Mendelsohn maneuver, modified Shaker with CTAR ball, hard /k/ in isolation,  effortful swallow, etc.), with min cues in 5/5 sessions in order to improve the strength/agility/efficiency of his swallow for improved swallowing safety and QOL. Status at last note/certification: Not met: progressing towards goal  Current Status: met: increase complexity of goal for increased endurance and independence in HEP     Goal: 6.  Patient will recall/demonstrate aspiration precautions and safe swallowing strategies with 100% acc when provided with occasional cues in 5/5 sesions (small sips/bites; chin tuck with all liquid intake; alternate liquids/solids; slow rate; NO straws, Sit upright at 90 degree angle during PO trials)  to decrease the reported incidences   Status at last note/certification: Not met, progressing: utilizing with po with Michael, approaching goal  Current Status: met: tolerates 6oz of thin liquids and 5/5 solid trials with min cues for compensatory strategies    Key functional changes: The [place] Dysarthria Assessment 2nd Edition (FDA-2) for re-assessment of dysarthria. Results are scored by a rating scale form from an A (normal function) to an E (no function) by 7 sections, including:  Reflexes, Respiration, lips, Palate, Laryngeal, Tongue, and Intelligibility. [+] added to indicate areas of improved scoring when compared to initial evaluation scores at start of care. Results are as indicated:  Reflexes- Cough B Swallow B-C, Dribble/Drool A   Respiration: At Rest A , At Speech B [+]   Lips: At Rest A-B [+], Spread B , Seal A , Alternate A-B[+] , In speech B[+]   Palate: Fluids A , Maintenance A-B , In-Speech A   Laryngeal: Time A-B[+], Pitch B[+], Volume B[+],  In speech A-B[+] ,  Tongue: At rest: A , Protrusion: B[+], Elevation:A-B[+], Lateral:A-B[+] Alternate:B[+], In Speech: B-C[+]  Intelligibility: Words: B[+], Sentences: B[+], Conversation: B-C[+]  Pt has demonstrated progress in labial and lingual ROM/coordination. Findings include mild to moderate limitations with all lingual and labial ROM/strength/ coordination. Pt denies c/o SOB at rest and has made functional gains in breath support/coordination with voicing tasks and in conversation. He was able to sustain a vowel for ~ 14 seconds. Improved control with pitch and volume changes with mild deviations. He has demonstrated progress in speech intelligibility at the word and sentence level, however continues to require cueing to slow rate, increase volume, and over-articulate to increase clarity at the conversational level. Overall, his speech is judged at ~85% intelligibility to a trained ear with mild distortions/omissions and has to repeat himself occasionally.  He presents mild dysarthric speech characterized by monotone speech, imprecise articulation, slow speech rate, and blurred word boundaries. Pt has made significant gains in oropharyngeal strength and endurance. He is completing laryngeal, pharyngeal, and tongue based retraction exercises with increased precision and slowly increasing number of repetitions. He reports significant improvement in tolerance of foods and liquids in home setting. He continues to utilize chin tuck postural strategy to improve airway protection. He is utilizing compensatory strategies to decrease occurrence of dysphagia and increase safety for oral intake. Pt is progressing in efficiency of swallow, tolerance of current diet, and use of compensatory strategies during treatment sessions. It is also recommended, that pt complete a modified barium swallow study (MBS) to re-evaluate oropharyngeal function, rule out silent aspiration, and assist in identification of least restrictive diet and compensatory/positional strategies. Pt would benefit from continued skilled speech therapy addressing dysphagia and dysarthria for speech intelligibility, dignity, efficiency of swallow, safety for oral intake, and QOL. Problems/ barriers to goal attainment: none     Problem List:      []aphasic  [x]dysarthric  [x]dysphagic       []alexic  []agraphic  []dysphonia       []dysfluency  []Cognitive-Linguistic Disorder       []other     Treatment Plan: Dysarthria Treatment, Dysphagia Treatment, Treatment of Swallowing and Patient Education    Patient Goal (s) has been updated and includes: continue STG 1, 3,4 ; increase complexity STG 2 & 5    Goals for this certification period to be accomplished in 4 weeks:  1. The pt will increase quality and length of phonation by sustaining ah utilizing effective diaphragmatic breath support for >15 seconds when provided with min-modA in 3/3 sessions to increase functional speech intelligibility.   2. The patient will articulate (s/z) consonants at the sentence to conversational level with 85% accuracy with use of compensatory strategies and OMES  to improve speech intelligibility when provided with min cues. 3. The patient will articulate  (l, voiced /th/ consonants at the sentence to conversational level  with 90% accuracy with use of compensatory strategies and OMES  to improve speech intelligibility when provided with min cues. 4.  Pt will demonstrate adequate vocal intensity in varying levels from whisper to yelling with 85% accuracy in structured tasks at the sentence to conversational level when provided with min cues   5. Patient will complete tongue base retraction,and laryngeal/pharyngeal strengthening exercises x25 (including Sheyla maneuver, Mendelsohn maneuver, modified Shaker with CTAR ball, hard /k/ in isolation,  effortful swallow, etc.), with min cues in 4/4 sessions in order to improve the strength/agility/efficiency of his swallow for improved swallowing safety and QOL. Frequency / Duration: Patient to be seen 2 times per week for 4 weeks:    Assessment/Recommendations: It is recommended pt continue skilled speech therapy services to address dysarthria and dysphagia to increase speech intelligibility for socialization, safety and efficiency of swallow for QOL. Certification Period: 1/15/20 - 2/13/20    SILVERIO Greer 1/15/2020 1:47 PM  MS CCC-SLP  Speech-Language Pathologist       _____________________________________________________________________    I certify that the above Therapy Services are being furnished while the patient is under my care. I agree with the treatment plan and certify that this therapy is necessary. []  I have read the above report and request that my patient continue as recommended.   []  I have read the above report and request that my patient continue therapy with the following changes/special instructions:________________________________________  []I have read the above report and request that my patient be discharged from therapy.     Physician's Signature:____________Date:_________TIME:________    ** Signature, Date and Time must be completed for valid certification **      Please sign and return to [facility]   [address] [place] [address]  [phone] fax

## 2020-01-17 ENCOUNTER — APPOINTMENT (OUTPATIENT)
Dept: PHYSICAL THERAPY | Age: 65
End: 2020-01-17
Payer: MEDICARE

## 2020-01-17 ENCOUNTER — HOSPITAL ENCOUNTER (OUTPATIENT)
Dept: PHYSICAL THERAPY | Age: 65
Discharge: HOME | End: 2020-01-17
Payer: MEDICARE

## 2020-01-17 DIAGNOSIS — G47.33 OSA (OBSTRUCTIVE SLEEP APNEA): Primary | ICD-10-CM

## 2020-01-17 PROCEDURE — 97110 THERAPEUTIC EXERCISES: CPT

## 2020-01-17 PROCEDURE — 97112 NEUROMUSCULAR REEDUCATION: CPT

## 2020-01-17 NOTE — PROGRESS NOTES
OT DAILY TREATMENT NOTE 10-18    Patient Name: Toña Tamayo  Date:2020  : 1955  [x]  Patient  Verified  Payor: [place] MEDICARE / Plan: [place] MEDICARE PART A & B / Product Type: Medicare /    In time:900  Out time:940  Total Treatment Time (min): 40  Visit #: 6 of 12    Medicare/BCBS Only   Total Timed Codes (min):  40 1:1 Treatment Time:  40     Treatment Area: Upper extremity weakness [R29.898]  Cerebral infarction, unspecified [I63.9]    SUBJECTIVE  Pain Level (0-10 scale): 0/10  Any medication changes, allergies to medications, adverse drug reactions, diagnosis change, or new procedure performed?: [x] No    [] Yes (see summary sheet for update)  Subjective functional status/changes:   [] No changes reported  Patient reports having fall yesterday (20) when walking out of the side door at his home. Patient reports not lifting foot when walking over threshhold and fell onto back. Patient reports no pain from incident and insists that he did not hit his head. Wife was home and assisted patient to his feet. Physical Therapy team notified.     OBJECTIVE    14 min Therapeutic Exercise:  [] See flow sheet :   Rationale: increase ROM and increase strength to improve the patients ability to functionally use Right UE    BUE Floor Reach   Scapular Elevation/Retraction   PROM Wrist/Digits  PROM/AROM Shoulder Abduction, Shoulder Flexion    25 min Neuromuscular Re-education:  []  See flow sheet :   Rationale: increase ROM, increase strength and improve coordination  to improve the patients ability to functionally use Right UE    Seated [facility] [place]: B Shoulder flexion 3 x 10  Recip Sup/Pro  Recip with resistance Shoulder Flexion   Grasp of sock: 1x with Max Assist        With   [] TE   [] TA   [] neuro   [] other: Patient Education: [x] Review HEP    [] Progressed/Changed HEP based on:   [] positioning   [] body mechanics   [] transfers   [] heat/ice application   [] Splint wear/care   [] Sensory re-education [] scar management      [] other:            Other Objective/Functional Measures:     Increased tremors on this date        Pain Level (0-10 scale) post treatment: 0/10    ASSESSMENT/Changes in Function: increased fatigue noted during session    Patient will continue to benefit from skilled OT services to modify and progress therapeutic interventions, address ROM deficits, address strength deficits and instruct in home and community integration to attain remaining goals. []  See Plan of Care  []  See progress note/recertification  []  See Discharge Summary         Progress towards goals / Updated goals:  1.   Patient will be able to actively grasp and release light items ith right hand to assist with self care tasks  Current status-grasped sock ball x 1 1/8/20, not able to grasp PVC but not tightly enough to  move PVC on [place] 1/10/20  Status at last visit: Able to grasp sock ball today 1/6/20  2.  Patient will be able to actively flex shoulder to 45 degrees to improve ability to place arm through sleeve. Current status: Reviewed NMES 1/10/20  3.  Patient will be able to actively extend wrist to posiiton hand for grasp of items. Current status: Active extension not seen 1/8/20  New goal:   4.  .  Patient will robert able to actively abduct shoulder to put on deodorant.   Status last visit: Palpable middlle deltoid with poor strength 1/6/20, improved in supine 1/8/20, reviewed NMES    PLAN  []  Upgrade activities as tolerated     [x]  Continue plan of care  []  Update interventions per flow sheet        []  Discharge due to:_  []  Other:_      DARELL Mckeon 1/17/2020  8:59 AM    Future Appointments   Date Time Provider Marc Ruvalcaba   1/17/2020  9:00 AM Naila Velasquez SYNVEOL [facility]   1/20/2020  9:45 AM SILVERIO Harper XACMRPATRICIO [facility]   1/20/2020 10:30 AM Osvaldo Cardoza PT MMCPTPB [facility]   1/20/2020 11:00 AM Naila IGNACIOTTO [facility]   1/22/2020 10:15 AM Jorja Crigler, OTR/L MMCPTPB [facility] 1/22/2020 11:00 AM Osvaldo Cardoza, PT MMCPTPB [facility]   1/22/2020 11:30 AM Malachi Alvarez I, SLP CBEGRQR [facility]   1/24/2020  1:15 PM Jorja Crigler, OTR/L MMCPTPB [facility]   1/27/2020  9:45 AM Malachi Alvarez I, SLP MMCPTPB [facility]   1/27/2020 10:30 AM Osvaldo Cardoza, PT MMCPTPB [facility]   1/27/2020 11:00 AM Naila Velasquez JNEFIBG [facility]   1/29/2020 10:45 AM Renate Cortes, SILVERIO MMCPTPB [facility]   1/29/2020 11:30 AM Da Graft, PTA MMCPTPB [facility]   1/29/2020 12:30 PM Naila Renbertha DSDQLPI [facility]   1/31/2020  3:15 PM Naila Rend TRKRQPW [facility]   2/3/2020  8:45 AM Naila Renbertha XEOMMNH [facility]   2/3/2020 10:00 AM Da Graft, PTA MMCPTPB [facility]   2/3/2020 10:30 AM Malachi Alvarez I, SLP PCYXLVU [facility]   2/5/2020  8:00 AM Naila Ron DNJAMCD [facility]   2/5/2020  9:00 AM Malachi Alvarez I, SLP MMCPTPB [facility]   2/5/2020 10:00 AM Da Graft, PTA MMCPTPB [facility]   2/7/2020  9:00 AM Nailaleonidas Velasquez SEWJTOP [facility]   2/10/2020  8:30 AM Hardeep Jones MD [address]   2/10/2020  9:45 AM SILVERIO Harper PSTGQTL [facility]   2/10/2020 11:00 AM Nailaleonidas Velasquez SHJOABW [facility]   2/10/2020 12:00 PM Da Graft, PTA MMCPTPB [facility]   2/11/2020  9:15 AM SILVERIO Harper YCHVEYP [facility]   2/11/2020 10:00 AM Da Graft, PTA MMCPTPB [facility]   2/11/2020 10:30 AM Naila Rend UPRYCYL [facility]   2/14/2020 10:15 AM Asher Hodgson MD [facility]   2/14/2020  1:15 PM Naila Rend BSGVCHD [facility]   2/17/2020  8:00 AM Naila Renbertha SIPJCPQ [facility]   2/17/2020  9:00 AM Da Graft, PTA MMCPTPB [facility]   2/17/2020  9:45 AM SILVERIO Harper AJSKSGH [facility]   2/19/2020  8:00 AM Naila Rend NRRZPDS [facility]   2/19/2020  9:00 AM SILVERIO Alcazar MMCPTPB [facility]   2/19/2020 10:00 AM Da Graft, PTA MMCPTPB [facility]   2/21/2020  9:00 AM Naila Rend ZRBRVXY [facility]   2/24/2020  8:45 AM Naila Rend WTBIBTU [facility]   2/24/2020  9:45 AM SILVERIO Alcazar MMCPTPB [facility]   2/24/2020 10:30 AM Da Carpenter PTA MMCPTPB [facility] 2/26/2020  8:00 AM Lisbet Rincon EMEKLDB [facility]   2/26/2020  9:00 AM SILVERIO Riddle TAYQAPT [facility]   2/26/2020 10:30 AM Emir Wyatt, PT [id number] [facility]   2/28/2020  1:15 PM Lisbet Rincon MMCPTPB [facility]

## 2020-01-17 NOTE — TELEPHONE ENCOUNTER
Please advise, Patient's machine has broken and wanted to know if you could order him a new machine and the humidifier.

## 2020-01-20 ENCOUNTER — HOSPITAL ENCOUNTER (OUTPATIENT)
Dept: PHYSICAL THERAPY | Age: 65
Discharge: HOME | End: 2020-01-20
Payer: MEDICARE

## 2020-01-20 ENCOUNTER — APPOINTMENT (OUTPATIENT)
Dept: PHYSICAL THERAPY | Age: 65
End: 2020-01-20
Payer: MEDICARE

## 2020-01-20 PROCEDURE — 92526 ORAL FUNCTION THERAPY: CPT

## 2020-01-20 PROCEDURE — 97112 NEUROMUSCULAR REEDUCATION: CPT

## 2020-01-20 PROCEDURE — 97110 THERAPEUTIC EXERCISES: CPT

## 2020-01-20 PROCEDURE — 97530 THERAPEUTIC ACTIVITIES: CPT

## 2020-01-20 PROCEDURE — 92507 TX SP LANG VOICE COMM INDIV: CPT

## 2020-01-20 NOTE — PROGRESS NOTES
PT DAILY TREATMENT NOTE 10-18    Patient Name: Radha Number  Date:2020  : 1955  [x]  Patient  Verified  Payor: [place] MEDICARE / Plan: [place] MEDICARE PART A & B / Product Type: Medicare /    In time:1101  Out time:1134  Total Treatment Time (min): 33  Visit #: 8 of 12    Medicare/BCBS Only   Total Timed Codes (min):  33 1:1 Treatment Time:  33       Treatment Area: Lower extremity weakness [R29.898]  Cerebral infarction, unspecified [I63.9]    SUBJECTIVE  Pain Level (0-10 scale): 0/10  Any medication changes, allergies to medications, adverse drug reactions, diagnosis change, or new procedure performed?: [x] No    [] Yes (see summary sheet for update)  Subjective functional status/changes:   [] No changes reported  Reports had a fall last week while trying to step over a treshold at home. He fell onto his porch but did not get injured. His wife was able to help him up. OBJECTIVE      33 min Therapeutic Exercise:  [] See flow sheet :   Rationale: increase ROM and increase strength to improve the patients ability to ease with ADL's. With   [] TE   [] TA   [] neuro   [] other: Patient Education: [x] Review HEP    [] Progressed/Changed HEP based on:   [] positioning   [] body mechanics   [] transfers   [] heat/ice application    [] other:      Other Objective/Functional Measures: Working on increasing WB onto left LE with  Cone taps with each left LE and crossing midline. Gait training with SPC with shorter strides, and step length for keeping COG over CHRISTIAN for safety during ambulation. Leg press , Single leg, 60#, 2 x 10. Avoid hip ER with assist with belt. Pain Level (0-10 scale) post treatment: 0/10    ASSESSMENT/Changes in Function: Progressing well. Pt will work on decreasing step length and slowing down ex reps and focus more on concentrating on hip flexion/ ankle DF gait cycle. Pt's shoes are very loose and came off easily. This could be a probable cause of his fall.     Patient will continue to benefit from skilled PT services to modify and progress therapeutic interventions, address functional mobility deficits, address ROM deficits, address strength deficits, analyze and address soft tissue restrictions, analyze and cue movement patterns, analyze and modify body mechanics/ergonomics, assess and modify postural abnormalities and address imbalance/dizziness to attain remaining goals. [x]  See Plan of Care  []  See progress note/recertification  []  See Discharge Summary         Progress towards goals / Updated goals:  1. Pt will ambulate 4 steps x 3 with 1 HR with supervision to be able to navigate stairs at home.   2. Pt will increase FOTO score by 7 points in order to demonstrate improved functional ability   3. Pt will increase right hamstring strength to 2+/5 in order to improve right foot clearance and normalize gait pattern for increased ambulatory tolerance and reduced fall risk.   Progressing. 4. Pt will demonstrate Romberg EC on a compliant surface for 15 seconds without LOB in order to demonstrate improved stability in poorly lit environments.   Progressing. 1/13/20  5. Pt will ambulate >150' with SPC and no evidence of instability in order to improve ease of household negotiation with LRAD.             DELFIN well. Pt is ambulating with SPC in clinic.  1/8/20    PLAN  [x]  Upgrade activities as tolerated     [x]  Continue plan of care  []  Update interventions per flow sheet       []  Discharge due to:_  []  Other:_      Edinson Garcia PT 1/20/2020  11:44 AM    Future Appointments   Date Time Provider Marc Ruvalcaba   1/22/2020 10:15 AM Riccardo Cabello, OTR/L MMCPTPB [facility]   1/22/2020 11:00 AM Jan Cho PTA MMCPTPB [facility]   1/22/2020 11:30 AM Ksenia Pereira I SLP NFPVWIM [facility]   1/24/2020  1:15 PM Riccardo Cabello, OTR/L MMCPTPB [facility]   1/27/2020  9:45 AM Ksenia Pereira I SLP BZYDYXR [facility]   1/27/2020 10:30 AM Richie Olvera PT MMCPTPB [facility]   1/27/2020 11:00 AM Esa Reich QDCRZRM [facility]   1/29/2020 10:45 AM Sandro Perez, SILVERIO BPZBRJF [facility]   1/29/2020 11:30 AM Berna Olson PTA MMCPTPB [facility]   1/29/2020 12:30 PM Maryjane Orellana PJSQUDD [facility]   1/31/2020  3:15 PM Maryjane Orellana MHSPUTV [facility]   2/3/2020  8:45 AM Maryjane Orellana QSMFHZF [facility]   2/3/2020 10:00 AM Berna Olson PTA MMCPTPB [facility]   2/3/2020 10:30 AM SILVERIO Villalobos XGIQYOC [facility]   2/5/2020  8:00 AM Maryjane Orellana MIQDVJZ [facility]   2/5/2020  9:00 AM SILVERIO Villalobos PYFCRHP [facility]   2/5/2020 10:00 AM Berna Olson PTA MMCPTPB [facility]   2/7/2020  9:00 AM Maryjane Orellana URAFOAC [facility]   2/10/2020  8:30 AM Nathanael Byrd MD [address]   2/10/2020  9:45 AM SILVERIO Song THIDWRK [facility]   2/10/2020 11:00 AM Maryjane Orellana WAKNNGI [facility]   2/10/2020 12:00 PM Berna Olson PTA MMCPTPB [facility]   2/11/2020  9:15 AM SILVERIO Song PJZXDEX [facility]   2/11/2020 10:00 AM Berna Olson PTA MMCPTPB [facility]   2/11/2020 10:30 AM Maryjane Orellana GLGMVEN [facility]   2/14/2020 10:15 AM Madison Sahni MD [facility]   2/14/2020  1:15 PM Maryjane Orellana UBSHRGB [facility]   2/17/2020  8:00 AM Maryjane Orellana PTXHTQN [facility]   2/17/2020  9:00 AM Berna Olson PTA MMCPTPB [facility]   2/17/2020  9:45 AM Sandro Perez, SILVERIO AQJGQBQ [facility]   2/19/2020  8:00 AM Maryjane Orellana CDADCHG [facility]   2/19/2020  9:00 AM Zabrina Skinner I, SILVERIO ROKMSVQ [facility]   2/19/2020 10:00 AM Berna Olson PTA MMCPTPB [facility]   2/21/2020  9:00 AM Maryjane Orellana AVKLXDN [facility]   2/24/2020  8:45 AM Maryjane Orellana MMCPTPB [facility]   2/24/2020  9:45 AM SILVERIO Song MMCPTPB [facility]   2/24/2020 10:30 AM Berna Olson PTA MMCPTPB [facility]   2/26/2020  8:00 AM Maryjane Orellana IXQULTC [facility]   2/26/2020  9:00 AM Zabrina Skinner I SLP UXWDWWW [facility]   2/26/2020 10:30 AM Edda Salvador, PT ZQWZZYN [facility]   2/28/2020  1:15 PM Maryjane Orellana KPWZPJO [facility]

## 2020-01-20 NOTE — PROGRESS NOTES
OT DAILY TREATMENT NOTE 10-18    Patient Name: Viji Kam  Date:2020  : 1955  [x]  Patient  Verified  Payor: VA MEDICARE / Plan: VA MEDICARE PART A & B / Product Type: Medicare /    In time:1103  Out time:1143  Total Treatment Time (min): 40  Visit #: 7 of 12    Medicare/BCBS Only   Total Timed Codes (min):  40 1:1 Treatment Time:  40     Treatment Area: Upper extremity weakness [R29.898]  Cerebral infarction, unspecified [I63.9]    SUBJECTIVE  Pain Level (0-10 scale): 0/10  Any medication changes, allergies to medications, adverse drug reactions, diagnosis change, or new procedure performed?: [x] No    [] Yes (see summary sheet for update)  Subjective functional status/changes:   [] No changes reported  It's loose so I can get my shoe on over the brace. I will have my wife put the (elasitc) laces back in. OBJECTIVE    15 min Therapeutic Exercise:  [] See flow sheet :   Rationale: increase ROM and increase strength to improve the patients ability to move Right UE    AROM elbow flex ext x 10  AROM shoulder flex in sitting right  AROM shoulder abd in sitting right  PROm wrist and hand right        15 min Neuromuscular Re-education:  []  See flow sheet :   Rationale: increase ROM, increase strength and improve coordination  to improve the patients ability to move Right UE freely     [address]: B Shoulder flexion 3 x 10  Seated Elbow flex/ext  Recip sup/pro with quick stretch   Recip with resistance Shoulder Flexion   Hand from table to opposite knee and back: 2x10    10 min Therapeutic Activity:  []  See flow sheet :   Rationale: increase ROM, improve coordination and increase safety  to improve the patients ability to functionally transfer and ambulate around house for ADL/IADL's    Patient began transfer from stand to quad on mat when shoe fell off causing patient to lose footing.  With assist from Physical Therapist and ANDERSON, Patient was able to recover and make it back to seated at edge of mat. Extensive patient education on importance of proper footwear and it's impact on safety with transfers and functional ambulation. Patient advised to have elastic shoelaces placed into shoes with patient voicing understanding. Will check for carryover next visit. With   [] TE   [] TA   [] neuro   [] other: Patient Education: [x] Review HEP    [] Progressed/Changed HEP based on:   [] positioning   [] body mechanics   [] transfers   [] heat/ice application   [] Splint wear/care   [] Sensory re-education   [] scar management      [] other:            Other Objective/Functional Measures:     Improved performance with beach ball and table to knee task secondary to rest breaks in between sets   Cueing for posture     Pain Level (0-10 scale) post treatment: 0/10    ASSESSMENT/Changes in Function: improved performance with enforced breaks    Patient will continue to benefit from skilled OT services to modify and progress therapeutic interventions, address ROM deficits, address strength deficits and instruct in home and community integration to attain remaining goals. []  See Plan of Care  []  See progress note/recertification  []  See Discharge Summary         Progress towards goals / Updated goals:  1.   Patient will be able to actively grasp and release light items ith right hand to assist with self care tasks  Current status-grasped sock ball x 1 1/8/20, not able to grasp PVC but not tightly enough to  move PVC on [place] 1/10/20  Status at last visit: Able to grasp sock ball today 1/6/20    2.  Patient will be able to actively flex shoulder to 45 degrees to improve ability to place arm through sleeve. Current status: Reviewed NMES 1/10/20    3.  Patient will be able to actively extend wrist to posiiton hand for grasp of items. Current status: Active extension not seen 1/8/20    New goal:   4.  .  Patient will robert able to actively abduct shoulder to put on deodorant.   Status last visit: Palpable middlle deltoid with poor strength 1/6/20, improved in supine 1/8/20, reviewed NMES       PLAN  []  Upgrade activities as tolerated     [x]  Continue plan of care  []  Update interventions per flow sheet       []  Discharge due to:_  []  Other:_      Milka Maguire ,ANDERSON/L 1/20/2020  10:02 AM    Future Appointments   Date Time Provider Marc Ruvalcaba   1/20/2020 10:30 AM Jordan Villanueva, PT MMCPTPB [address]   1/20/2020 11:00 AM Myles Re DJOIIFW [address]   1/22/2020 10:15 AM Andres Lan, OTR/L MMCPTPB [address]   1/22/2020 11:00 AM Petersburg Furl, PTA MMCPTPB [address]   1/22/2020 11:30 AM Rafael Fonseca I, SLP FIQVXWA [address]   1/24/2020  1:15 PM Andres Lan, OTR/L MMCPTPB [address]   1/27/2020  9:45 AM Isidro Blanchard, SLP OXUNVSF [address]   1/27/2020 10:30 AM Jordan Villanueva, PT MMCPTPB [address]   1/27/2020 11:00 AM Myles Re AHQOQWL [address]   1/29/2020 10:45 AM Isidro Blanchard, SLP NDBPHDG [address]   1/29/2020 11:30 AM Petersburg Furl, PTA MMCPTPB [address]   1/29/2020 12:30 PM Myles Re UAXZBXU [address]   1/31/2020  3:15 PM Myles Re CIZKTQK [address]   2/3/2020  8:45 AM Myles Re PZRKMRV [address]   2/3/2020 10:00 AM Petersburg Furl, PTA MMCPTPB [address]   2/3/2020 10:30 AM Rafael Raymundo I, SLP GYFLCWT [address]   2/5/2020  8:00 AM Myles Re WHHYHJT [address]   2/5/2020  9:00 AM Rafael Fonseca I SLP MMCPTPB [address]   2/5/2020 10:00 AM Mariela Flaherty, PTA [facility]CPTPB [address]   2/7/2020  9:00 AM Myles Re VHDZSEJ [address]   2/10/2020  8:30 AM Armando Damico MD [address]   2/10/2020  9:45 AM Isidro Blanchard SLP FRCEINA [address]   2/10/2020 11:00 AM Myles Edmonds RPQJGNR [address]   2/10/2020 12:00 PM Mariela Flaherty, PTA MMCPTPB [address]   2/11/2020  9:15 AM Isidro Blanchard SLP YANIBEB [address]   2/11/2020 10:00 AM Mariela Flaherty, PTA MMCPTPB [address]   2/11/2020 10:30 AM Myles Re QLQVPAP [address]   2/14/2020 10:15 AM Kian Trotter MD [facility]   2/14/2020  1:15 PM Myles Edmonds DICLZNY [address]   2/17/2020  8:00 AM Kristan  Jared OKFOAFG [facility]   2/17/2020  9:00 AM Aida Morrow, PTA MMCPTPB [facility]   2/17/2020  9:45 AM Yuri Merida, SLP CVMAJMT [facility]   2/19/2020  8:00 AM Juleen Cone HGWYMXZ [facility]   2/19/2020  9:00 AM Kristin Curry I, SLP CTBSRJQ [facility]   2/19/2020 10:00 AM Aida Morrow, PTA MMCPTPB [facility]   2/21/2020  9:00 AM Juleen Cone VDMZPNM [facility]   2/24/2020  8:45 AM Juleen Cone CFVUEWM [facility]   2/24/2020  9:45 AM Yuri Merida, SLP GCBBLGZ [facility]   2/24/2020 10:30 AM Aida Morrow, PTA MMCPTPB [facility]   2/26/2020  8:00 AM Juleen Cone FPALMIE [facility]   2/26/2020  9:00 AM Kristin Curry I, SLP BGVANSU [facility]   2/26/2020 10:30 AM Harriet Lindo, PT HYYAWZF [facility]   2/28/2020  1:15 PM Juleen Cone TZGMFNM [facility]

## 2020-01-20 NOTE — PROGRESS NOTES
ST DAILY TREATMENT NOTE    Patient Name: Yordan Parker  Date:2020  : 1955  [x]  Patient  Verified  Payor: Payor: [place] MEDICARE / Plan: [place] MEDICARE PART A & B / Product Type: Medicare /   In time: 950 Out time: 1030  Total Treatment Time (min): 40  Visit #: 1 of 8    Treatment Diagnosis: Dysarthria and anarthria [R47.1] Oropharyngeal dysphagia [R13.12]    SUBJECTIVE  Pain Level (0-10 scale): 0  Any medication changes, allergies to medications, adverse drug reactions, diagnosis change, or new procedure performed?: [x] No    [] Yes (see summary sheet for update)  Subjective functional status/changes:   [x] No changes reported  \"Will my face always droop on one side ?''    OBJECTIVE  Treatment provided includes:  Increase/Improve:  []  Voice Quality []  Cognitive Linguistic Skills [x]  Laryngeal/Pharyngeal Exercises   []  Vocal Loudness []  Reading Comprehension [x]  Swallowing Skills    []  Vocal Cord Function []  Auditory Comprehension [x]  Oral Motor Skills   []  Resonance []  Writing Skills []  Compensatory strategies    [x]  Speech Intelligibility []  Expressive Language []  Attention   [x]  Breath Support/Coord. []  Receptive language []  Memory   [x]  Articulation []  Safety Awareness [x] pt education    []  Fluency []  Word Retrieval []        Treatment Provided:  - pt education regarding POC, HEP, OMEs  - vowel prolongations for breath support/coordination   - sentences with skilled cueing and feedback via sound level meter for appropriate vocal intensity   - /s/ productions at the sentence level   - modified shaker with CTAR ball for laryngeal/pharyngeal strengthening   - high \"ee\"s for laryngeal elevation      Patient/Caregiver  Education: [x] Review HEP      HEP/Handouts given: lingual and labial exercise handout provided     Pain Level (0-10 scale) post treatment: 0    ASSESSMENT   Pt is progressing towards goals. Progress with carry over for increased vocal intensity.  Continued cues to slow rate and over articulation at the sentence level. Pt  Benefits from continued skilled ST services to improve speech intelligibility and strength and endurance of oropharyngeal swallow for QOL. []   Improving appropriately and progressing toward goals  [x]   Improving slowly and progressing toward goals  []   Approximating goals/maximum potential  [x]   Continues to benefit from skilled therapy to address remaining functional deficits  []   Not progressing toward goals and plan of care will be adjusted    Patient will continue to benefit from skilled therapy to address remaining functional deficits: dysphagia dysarthria     Progress towards goals / Updated goals:  1. The pt will increase quality and length of phonation by sustaining ah utilizing effective diaphragmatic breath support for >15 seconds when provided with min-modA in 3/3 sessions to increase functional speech intelligibility. 1/20/2020: progressing: (1) 10 seconds , (2) 14 seconds (3) 14 seconds, (4) 15 seconds, (5) 17 seconds, (6) 13 seconds, (7) 14 seconds  2. The patient will articulate (s/z) consonants at the sentence to conversational level with 85% accuracy with use of compensatory strategies and OMES  to improve speech intelligibility when provided with min cues. 1/20/2020: progressing: multi-syllabic /s/ productions at the sentence level 75% accuracy with Michael increasing to 85% accuracy with min-modA  3. The patient will articulate  (l, voiced /th/ consonants at the sentence to conversational level  with 90% accuracy with use of compensatory strategies and OMES  to improve speech intelligibility when provided with min cues.   1/20/2020: not addressed this date   4.  Pt will demonstrate adequate vocal intensity in varying levels from whisper to yelling with 85% accuracy in structured tasks at the sentence to conversational [place] provided with min cues  1/20/2020: progressing: sentence level during structure task with 85% accuracy with Michael with sound level meter utilized for increased pt feedback    5. Patient will complete tongue base retraction,and laryngeal/pharyngeal strengthening exercises x25 (including Sheyla maneuver, Mendelsohn maneuver, modified Shaker with CTAR ball, hard /k/ in isolation,  effortful swallow, etc.), with min cues in 4/4 sessions in order to improve the strength/agility/efficiency of his swallow for improved swallowing safety and QOL.  1/20/2020: x25 high \"ee'' ; x30 modified shaker with CTAR ; introduced additional lingual/labial exercises x20 with min-modA     PLAN  [x]  Continue plan of care  []  Modify Goals/Treatment Plan      []  Discharge due to:  [] Other:    SILVERIO Palafox 1/20/2020  9:49 AM    Future Appointments   Date Time Provider Marc Ruvalcaba   1/20/2020 10:30 AM Miguel Conner, SUKHWINDER MMCPTPB [facility]   1/20/2020 11:00 AM Cecy Corley DKDGUWC [facility]   1/22/2020 10:15 AM Norma Garcia, OTR/L [facility]CPTPB [facility]   1/22/2020 11:00 AM Magaly Hernandez PTA [facility]CPTPB [facility]   1/22/2020 11:30 AM SILVERIO Martinez MMCPTPB [facility]   1/24/2020  1:15 PM Norma Garcia, OTR/L MMCPTPB [facility]   1/27/2020  9:45 AM SILVERIO Barillas MMCPTPB [facility]   1/27/2020 10:30 AM Miguel Conner PT MMCPTPB [facility]   1/27/2020 11:00 AM Cecy Corley WHTVKOE [facility]   1/29/2020 10:45 AM Dilshad Savage SLP MMCPTPB [facility]   1/29/2020 11:30 AM Magaly Hernandez PTA [facility]CPTPB [facility]   1/29/2020 12:30 PM Cecy Corley SKXLSVA [facility]   1/31/2020  3:15 PM Cecy Corley IWSZUOS [facility]   2/3/2020  8:45 AM Cecy Corley QBPVGEG [facility]   2/3/2020 10:00 AM Magaly Hernandez PTA MMCPTPB [facility]   2/3/2020 10:30 AM Dilshad Savage, SLP MMCPTPB [facility]   2/5/2020  8:00 AM Cecy Corley WTWEVGQ [facility]   2/5/2020  9:00 AM SILVERIO Martinez MMCPTPB [facility]   2/5/2020 10:00 AM Magaly Hernandez, LINDA MMCPTPB [facility]   2/7/2020  9:00 AM Cecy Corley NFBOLSU [facility]   2/10/2020  8:30 AM Jose Amador  E [place]    2/10/2020  9:45 AM Dilshad Savage, SLP WXHJERN [facility]   2/10/2020 11:00 AM Cathy Warner DQZYLHU [facility]   2/10/2020 12:00 PM Jeromy Lora, PTA MMCPTPB [facility]   2/11/2020  9:15 AM SILVERIO Proctor SLWIQZS [facility]   2/11/2020 10:00 AM Jeromy Lora, PTA MMCPTPB [facility]   2/11/2020 10:30 AM [facility]MNW [facility]   2/14/2020 10:15 AM Vivek Guillory MD [facility]   2/14/2020  1:15 PM Cathy Warner QPLGJXU [facility]   2/17/2020  8:00 AM Cathy Warner UNRQUTL [facility]   2/17/2020  9:00 AM Jeromy Lora, PTA MMCPTPB [facility]   2/17/2020  9:45 AM SILVERIO Proctor JEHSDYJ [facility]   2/19/2020  8:00 AM Cathy Warner BFCZCNX [facility]   2/19/2020  9:00 AM SILVERIO Mancera VUJRJZA [facility]   2/19/2020 10:00 AM Jeromy Lora, PTA MMCPTPB [facility]   2/21/2020  9:00 AM Cathy Warner OEIKNWZ [facility]   2/24/2020  8:45 AM Cathy Warner MMCPTPB [facility]   2/24/2020  9:45 AM SILVERIO Proctor ODXFSJC [facility]   2/24/2020 10:30 AM Jeromy Lora, PTA MMCPTPB [facility]   2/26/2020  8:00 AM Cathy Warner IXPOPVZ [facility]   2/26/2020  9:00 AM Winston Menjivar I, SILVERIO PUGSXIV [facility]   2/26/2020 10:30 AM Farooq Villasenor, PT UQMPUBF [facility]   2/28/2020  1:15 PM Cathy Warner WCOZUOG [facility]

## 2020-01-22 ENCOUNTER — HOSPITAL ENCOUNTER (OUTPATIENT)
Dept: PHYSICAL THERAPY | Age: 65
Discharge: HOME | End: 2020-01-22
Payer: MEDICARE

## 2020-01-22 PROCEDURE — 97530 THERAPEUTIC ACTIVITIES: CPT

## 2020-01-22 PROCEDURE — 97110 THERAPEUTIC EXERCISES: CPT

## 2020-01-22 PROCEDURE — 92507 TX SP LANG VOICE COMM INDIV: CPT

## 2020-01-22 PROCEDURE — 97112 NEUROMUSCULAR REEDUCATION: CPT

## 2020-01-22 PROCEDURE — 92526 ORAL FUNCTION THERAPY: CPT

## 2020-01-22 NOTE — PROGRESS NOTES
ST DAILY TREATMENT NOTE    Patient Name: Titus Chew  Date:2020  : 1955  [x]  Patient  Verified  Payor: Payor: [place] MEDICARE / Plan: [place] MEDICARE PART A & B / Product Type: Medicare /   In time: 11:35 Out time: 2:15  Total Treatment Time (min): 40  Visit #: 2 of 8    Treatment Diagnosis: Dysarthria and anarthria [R47.1] Oropharyngeal dysphagia [R13.12]    SUBJECTIVE  Pain Level (0-10 scale): 0  Any medication changes, allergies to medications, adverse drug reactions, diagnosis change, or new procedure performed?: [x] No    [] Yes (see summary sheet for update)    Subjective functional status/changes:   [x] No changes reported  \"I'm doing good\"    OBJECTIVE  Treatment provided includes:  Increase/Improve:  []  Voice Quality []  Cognitive Linguistic Skills [x]  Laryngeal/Pharyngeal Exercises   []  Vocal Loudness []  Reading Comprehension [x]  Swallowing Skills    []  Vocal Cord Function []  Auditory Comprehension []  Oral Motor Skills   []  Resonance []  Writing Skills [x]  Compensatory strategies    [x]  Speech Intelligibility []  Expressive Language []  Attention   []  Breath Support/Coord. []  Receptive language []  Memory   [x]  Articulation []  Safety Awareness [x] pt ed   []  Fluency []  Word Retrieval []        Treatment Provided:  - effortful swallow   - supraglottic swallow   - pt education / review compensatory strategies   - drills producing /s/ blends at the sentence level with cueing   - drills producing /l/ at the sentence level with cueing     Patient/Caregiver  Education: [x] Review HEP      HEP/Handouts given: work on /s/ blends productions     Pain Level (0-10 scale) post treatment: 0    ASSESSMENT   Pt is progressing gradually in articulation of /s/ and /l/ .  Good progress using appropriate vocal volume today   []   Improving appropriately and progressing toward goals  [x]   Improving slowly and progressing toward goals  []   Approximating goals/maximum potential  [x]   Continues to benefit from skilled therapy to address remaining functional deficits  []   Not progressing toward goals and plan of care will be adjusted     Patient will continue to benefit from skilled therapy to address remaining functional deficits: dysphagia dysarthria     Progress towards goals / Updated goals:  1. The pt will increase quality and length of phonation by sustaining ah utilizing effective diaphragmatic breath support for >15 seconds when provided with min-modA in 3/3 sessions to increase functional speech intelligibility. 1/22/2020: not addressed   2. The patient will articulate (s/z) consonants at the sentence to conversational level with 85% accuracy with use of compensatory strategies and OMES  to improve speech intelligibility when provided with min cues. 1/22/2020: progressing: /s/ blends at the sentence level 85% accuracy with Michael and self corrections   3. The patient will articulate  (l, voiced /th/ consonants at the sentence to conversational level  with 90% accuracy with use of compensatory strategies and OMES  to improve speech intelligibility when provided with min cues.   1/22/2020: producing sentences containing /l/ with 90% accuracy with Michael and self corrections   4.  Pt will demonstrate adequate vocal intensity in varying levels from whisper to yelling with 85% accuracy in structured tasks at the sentence to conversational Shaaron Jb provided with min cues  1/22/2020: progressing: sentence level during structure task with 90% accuracy with Michael with sound level meter utilized for increased pt feedback    5. Patient will complete tongue base retraction,and laryngeal/pharyngeal strengthening exercises x25 (including Sheyla maneuver, Mendelsohn maneuver, modified Shaker with CTAR ball, hard /k/ in isolation,  effortful swallow, etc.), with min cues in 4/4 sessions in order to improve the strength/agility/efficiency of his swallow for improved swallowing safety and QOL.  1/22/2020: progressing: effortful swallow x20 with min-modA ; supraglottic swallow x22 with modA for accurate sequencing       PLAN  [x]  Continue plan of care  []  Modify Goals/Treatment Plan      []  Discharge due to:  [] Other:    SILVERIO Leavitt 1/22/2020  11:10 AM    Future Appointments   Date Time Provider Marc Ruvalcaba   1/22/2020 11:30 AM SILVERIO Dillon MMCPTPB [facility]   1/24/2020  1:15 PM Tunde Dunbar OTR/L MMCPTPB [facility]   1/27/2020  9:45 AM SILVERIO Dillon MMCPTPB [facility]   1/27/2020 10:30 AM Emir Wyatt, PT MMCPTPB [facility]   1/27/2020 11:00 AM Lisbet Rincon QHQWYDJ [facility]   1/29/2020 10:45 AM SILVERIO Dillon MMCPTPB [facility]   1/29/2020 11:30 AM Mark Lockwood, PTA MMCPTPB [facility]   1/29/2020 12:00 PM Tunde Dunbar OTR/L MMCPTPB [facility]   1/31/2020  3:15 PM Lisbet Rincon XDEXDRG [facility]   2/3/2020  8:45 AM Lisbet MCCAINIMGTN [facility]   2/3/2020 10:00 AM Mark Lockwood, PTA MMCPTPB [facility]   2/3/2020 10:30 AM SILVERIO Riddle MMCPTPB [facility]   2/5/2020  8:00 AM Lisbet Rincon ALCTMJE [facility]   2/5/2020  9:00 AM SILVERIO Riddle MMCPTPB [facility]   2/5/2020 10:00 AM Mark Lockwood PTA MMCPTPB [facility]   2/7/2020  9:00 AM Lisbet sharad [id number] [facility]   2/10/2020  8:30 AM Khloe Taveras MD [address]   2/10/2020  9:45 AM SILVERIO Dillon BVGRLHI [facility]   2/10/2020 11:00 AM Lisbet Rincon GOTSLTF [facility]   2/10/2020 12:00 PM Mark Lockwood PTA MMCPTPB [facility]   2/11/2020  9:15 AM [facility]ILVERIO Dillon [facility]B [facility]   2/11/2020 10:00 AM Mark Lockwood PTA MMCPTPB [facility]   2/11/2020 10:30 AM Lisbet Rincon ZXCIAKE [facility]   2/14/2020 10:15 AM Noel Lee MD [facility]   2/14/2020  1:15 PM Lisbet Rincon DXIBDWI [facility]   2/17/2020  8:00 AM Lisbet Rinocn MMCPTPB [facility]   2/17/2020  9:00 AM Mark Lockwood PTA [facility]   2/17/2020  9:45 AM SILVERIO Dillon XKKZDKW [facility]   2/19/2020  8:00 AM Lisbet Rincon GWZSUJP [facility]   2/19/2020  9:00 AM SILVERIO Riddle MMCPTPB [facility]   2/19/2020 10:00 AM Livia Fields, PTA MMCPTPB [facility]   2/21/2020  9:00 AM Dee Dee Norman AFGVGWL [facility]   2/24/2020  8:45 AM Dee Dee Norman MOXHYDZ [facility]   2/24/2020  9:45 AM Clif Finch, SILVERIO IEJDFRJ [facility]   2/24/2020 10:30 AM Livia Fields, PTA MMCPTPB [facility]   2/26/2020  8:00 AM Dee Dee Norman MFVFAVV [facility]   2/26/2020  9:00 AM Sarah Winkler I, SLP SWSWREB [facility]   2/26/2020 10:30 AM Sherrie Schlatter, PT ZPLKUQJ [facility]   2/28/2020  1:15 PM Dee Dee Norman MMCPTPB [facility]

## 2020-01-22 NOTE — PROGRESS NOTES
PT DAILY TREATMENT NOTE 10-18    Patient Name: Everett Gamboa  Date:2020  : 1955  [x]  Patient  Verified  Payor: VA MEDICARE / Plan: VA MEDICARE PART A & B / Product Type: Medicare /    In time:11:00 Out time:11:30  Total Treatment Time (min): 30  Visit #: 9 of 12    Medicare/BCBS Only   Total Timed Codes (min):  30 1:1 Treatment Time:  28       Treatment Area: Lower extremity weakness [R29.898]  Cerebral infarction, unspecified [I63.9]    SUBJECTIVE  Pain Level (0-10 scale): 0/10  Any medication changes, allergies to medications, adverse drug reactions, diagnosis change, or new procedure performed?: [x] No    [] Yes (see summary sheet for update)  Subjective functional status/changes:   [] No changes reported  \"I don't get to work my hamstring at home so can we do that today. \" Pt states working on walking with the cane at home. He doesn't like his shoe tight because then he can't slip it on and off easily. OBJECTIVE      15 min Therapeutic Exercise:  [] See flow sheet :   Rationale: increase ROM and increase strength to improve the patients ability to ease with ADLs. 15 minutes of Neuromuscular re-education working on right HS facilitation with pelvic depression to improve ease of knee flexion during swing phase of gait      With   [] TE   [] TA   [] neuro   [] other: Patient Education: [x] Review HEP    [] Progressed/Changed HEP based on:   [] positioning   [] body mechanics   [] transfers   [] heat/ice application    [] other:      Other Objective/Functional Measures:   Right hamstring MMT: 2-/5 in gravity minimized  Focused on activation with powder board and PNF D1 flexion  Challenged with single leg bridges  Continues to compensate with pelvic elevation during gait    Pain Level (0-10 scale) post treatment: 0/10    ASSESSMENT/Changes in Function: Pt making slow progress at improving right hamstring strength and decreasing right pelvic elevation during gait.  He continues to ambulate with a SBQC and is practicing with a [facility] at home. He has decreased ankle support due to keeping his shoe laces loose which put him at an increase risk of falls. Will continue to progress strength and independence with LRAD for decreased fall risk. Progress towards goals / Updated goals:  1. Pt will ambulate 4 steps x 3 with 1 HR with supervision to be able to navigate stairs at home.   2. Pt will increase FOTO score by 7 points in order to demonstrate improved functional ability   3. Pt will increase right hamstring strength to 2+/5 in order to improve right foot clearance and normalize gait pattern for increased ambulatory tolerance and reduced fall risk.   Progressing. 2-/5  4. Pt will demonstrate Romberg EC on a compliant surface for 15 seconds without LOB in order to demonstrate improved stability in poorly lit environments.   Progressing. 1/13/20  5. Pt will ambulate >150' with SPC and no evidence of instability in order to improve ease of household negotiation with LRAD.             AIYHLHHBMGO well. Pt is ambulating with SPC in clinic.  1/8/20    PLAN  [x]  Upgrade activities as tolerated     [x]  Continue plan of care  []  Update interventions per flow sheet       []  Discharge due to:_  []  Other:_      Candida Ards, PTA 1/22/2020  11:44 AM    Future Appointments   Date Time Provider Marc Ruvalcaba   1/24/2020  1:15 PM Merton Kanner, OTR/L MMCPTPB [facility]   1/27/2020  9:45 AM SILVERIO Go ETVYHWH [facility]   1/27/2020 10:30 AM Jamal Luna, PT MMCPTPB [facility]   1/27/2020 11:00 AM Raoul Gillette FUFWJVO [facility]   1/29/2020 10:45 AM SILVERIO Go YBFPXBX [facility]   1/29/2020 11:30 AM Jesi Reyes, PTA MMCPTPB [facility]   1/29/2020 12:00 PM Merton Kanner, OTR/L MMCPTPB [facility]   1/31/2020  3:15 PM Raoul Gillette BPPIQYT [facility]   2/3/2020  8:45 AM Raoul Gillette ZIZRZLA [facility]   2/3/2020 10:00 AM Jesi Reyes PTA MMCPTPB [facility]   2/3/2020 10:30 AM SILVERIO Grissom LMFVTWV [facility]   2/5/2020  8:00 AM Jessa Nian JJBMARN [address]   2/5/2020  9:00 AM SILVERIO Ward WKCBSJP [address]   2/5/2020 10:00 AM Lee Valadez PTA MMCPTPB [address]   2/7/2020  9:00 AM Jessa Nian CMTVCYN [address]   2/10/2020  8:30 AM Sekou Garcia MD [address]   2/10/2020  9:45 AM SILVERIO Ward DUQHZRQ [address]   2/10/2020 11:00 AM Jessa Nian KUFUKMS [address]   2/10/2020 12:00 PM Lee Valadez PTA MMCPTPB [address]   2/11/2020  9:15 AM SILVERIO Ward QVFUMDE [address]   2/11/2020 10:00 AM Lee Valadez PTA MMCPTPB [address]   2/11/2020 10:30 AM Jessa Nian KVYMTQR [address]   2/14/2020 10:15 AM Karthikeyan Alcantar MD [facility]   2/14/2020  1:15 PM Jessa Nian JUVSVYY [address]   2/17/2020  8:00 AM Jessa Nian GOGNTNK [address]   2/17/2020  9:00 AM Lee Valadez PTA MMCPTPB [address]   2/17/2020  9:45 AM SILVERIO Ward IBURCWG [address]   2/19/2020  8:00 AM Jessa Nian CDNZKVZ [address]   2/19/2020  9:00 AM SILVERIO Hunter NFFCLGD [address]   2/19/2020 10:00 AM Lee Valadez PTA MMCPTPB [address]   2/21/2020  9:00 AM Jessa Nian FPHNBTR [address]   2/24/2020  8:45 AM Jessa Nian MMCPTPB [address]   2/24/2020  9:45 AM Janice Churchill, SLP OCFRUPE [address]   2/24/2020 10:30 AM Lee Valadez, PTA MMCPTPB [address]   2/26/2020  8:00 AM Jessa Lomax INOELQN [address]   2/26/2020  9:00 AM Cherelle Meza I SLP LORWBIE [address]   2/26/2020 10:30 AM Jannet Varela, PT RFKSVMK [address]   2/28/2020  1:15 PM Jessa Lomax VNWYYPJ [address]

## 2020-01-22 NOTE — PROGRESS NOTES
OT DAILY TREATMENT NOTE 10-18    Patient Name: Myranda Harper  Date:2020  : 1955  [x]  Patient  Verified  Payor: VA MEDICARE / Plan: VA MEDICARE PART A & B / Product Type: Medicare /    In time:1015  Out time:1100  Total Treatment Time (min): 45  Visit #: 8 of 12    Medicare/BCBS Only   Total Timed Codes (min):  45 1:1 Treatment Time:  45     Treatment Area: Upper extremity weakness [R29.898]  Cerebral infarction, unspecified [I63.9]    SUBJECTIVE  Pain Level (0-10 scale): 0/10  Any medication changes, allergies to medications, adverse drug reactions, diagnosis change, or new procedure performed?: [x] No    [] Yes (see summary sheet for update)  Subjective functional status/changes:   [] No changes reported  Put laces back in shoes  Can lay on my back and lift my arm up in the air and then hold it there for a minute  I am using the NMES on my shoulder now at home  This is kind of frustrating today    OBJECTIVE        15 min Therapeutic Exercise:  [] See flow sheet :   Rationale: increase ROM to improve the patients ability to move shoulder  nd UE  Seated shoulder abd and flexion x 10 each  Trial of active wrist extension unable to achieve  PROM wrist and hand right    15 min Therapeutic Activity:  []  See flow sheet :   Rationale: increase ROM and improve coordination  to improve the patients ability to grasp  Standing attempt to grasp sock balls unable but able to flex shoulder and move arm to retrieve them and bring them to right side ( abduct shoulder)     15 min Neuromuscular Re-education:  []  See flow sheet :   Rationale: increase ROM and increase strength  to improve the patients ability to grasp  Forearm sup pro with hand held with quick stretch and partial ROM noted sup better than pro  Attempted sliding grasp with right hand to grasp cup and bring to mouth.           With   [] TE   [] TA   [] neuro   [] other: Patient Education: [x] Review HEP    [] Progressed/Changed HEP based on: need ot continue to stretch and strength, paln to finish up soon with HEP  [] positioning   [] body mechanics   [] transfers   [] heat/ice application   [] Splint wear/care   [] Sensory re-education   [] scar management      [] other:            Other Objective/Functional Measures:   Not able to successfully slide and grasp without assist to supinate adequately but able to initiate supination with hand on cup. Difficulty keeping thumb extended during sliding for grasp task. Able to raise cup from table but not to mouth    Shoulder ABd 15  Flex 50    Pain Level (0-10 scale) post treatment: 0/10    ASSESSMENT/Changes in Function: Still without active wrist extension. Supination improving as is shoulder functions    Patient will continue to benefit from skilled OT services to modify and progress therapeutic interventions, address ROM deficits, address strength deficits and instruct in home and community integration to attain remaining goals. []  See Plan of Care  []  See progress note/recertification  []  See Discharge Summary         Progress towards goals / Updated goals:  *1.   Patient will be able to actively grasp and release light items ith right hand to assist with self care tasks  Current status-grasped sock ball x 1 1/8/20, not able to grasp PVC but not tightly enough to  move PVC on [place] 1/10/20  Status at last visit: Able to grasp sock ball today 1/6/20, not met 1/22/20     2.  Patient will be able to actively flex shoulder to 45 degrees to improve ability to place arm through sleeve. Current status: Reviewed NMES 1/10/20, met today 50 degrees 1/22/20     3.  Patient will be able to actively extend wrist to posiiton hand for grasp of items. Current status: Active extension not seen 1/8/20, 1/22/20     New goal:   4.  .  Patient will robert able to actively abduct shoulder to put on deodorant.   Status last visit: Palpable middlle deltoid with poor strength 1/6/20, improved in supine 1/8/20, reviewed NMES, abduction 15 1/22/20       PLAN  []  Upgrade activities as tolerated     []  Continue plan of care  []  Update interventions per flow sheet       []  Discharge due to:_  []  Other:_      Arturo Lynn, OTR/L 1/22/2020  8:04 AM    Future Appointments   Date Time Provider Marc Peg   1/22/2020 10:15 AM Jeff Roman, OTR/L MMCPTPB [facility]   1/22/2020 11:00 AM Patricia Seeds, PTA MMCPTPB [facility]   1/22/2020 11:30 AM Hetal Wu, SLP MMCPTPB [facility]   1/24/2020  1:15 PM Jeff Roman, OTR/L MMCPTPB [facility]   1/27/2020  9:45 AM Hetal Wu SLP MMCPTPB [facility]   1/27/2020 10:30 AM Renee Villalobos, PT MMCPTPB [facility]   1/27/2020 11:00 AM Makenzie Roa OZKYLMX [facility]   1/29/2020 10:45 AM SILVERIO Paulino MMCPTPB [facility]   1/29/2020 11:30 AM Patricia Polk, PTA MMCPTPB [facility]   1/29/2020 12:00 PM Jeff Roman, OTR/L MMCPTPB [facility]   1/31/2020  3:15 PM Makenzie Roa AYPDAYD [facility]   2/3/2020  8:45 AM Makenzie Roa FIINBQA [facility]   2/3/2020 10:00 AM Patricia Seeds, PTA MMCPTPB [facility]   2/3/2020 10:30 AM Ricarda Lewis I SLP MMCPTPB [facility]   2/5/2020  8:00 AM Makenzie MENDOZA [facility]   2/5/2020  9:00 AM Ricarda Lewis I SLP MMCPTPB [facility]   2/5/2020 10:00 AM Patricia Seeds, PTA MMCPTPB [facility]   2/7/2020  9:00 AM Makenzie Janina UKGPKEF [facility]   2/10/2020  8:30 AM German Montiel MD [address]   2/10/2020  9:45 AM SILVERIO Paulino QXYHUMQ [facility]   2/10/2020 11:00 AM Makenzie Roa THEGBSA [facility]   2/10/2020 12:00 PM Patricia Seeds, PTA MMCPTPB [facility]   2/11/2020  9:15 AM SILVERIO Paulino SIFEVHV [facility]   2/11/2020 10:00 AM Patricia Seeds, PTA MMCPTPB [facility]   2/11/2020 10:30 AM Makenzie Janina OXRJIBA [facility]   2/14/2020 10:15 AM Norbert Au MD [facility]   2/14/2020  1:15 PM Makenzie Janina XVWYYIE [facility]   2/17/2020  8:00 AM Makenzie Janina PARTFMD [facility]   2/17/2020  9:00 AM Patricia Seeds, PTA MMCPTPB [facility]   2/17/2020  9:45 AM Hetal Wu SLP QICSWQG [facility]   2/19/2020  8:00 AM Makenzie Roa YSICRVF [address]   2/19/2020  9:00 AM Bladimir Raman I SLP MMCPTPB [address]   2/19/2020 10:00 AM Julisa Сергей, PTA MMCPTPB [address]   2/21/2020  9:00 AM No [place] KQYZKRQ [address]   2/24/2020  8:45 AM No [place] BTVAXIM [address]   2/24/2020  9:45 AM SILVERIO Stinson TNWXATO [address]   2/24/2020 10:30 AM Julisa Rushing, PTA MMCPTPB [address]   2/26/2020  8:00 AM No [place] ZEPJBOV [address]   2/26/2020  9:00 AM Bladimir Raman I, SLP WKOIWXP [address]   2/26/2020 10:30 AM Melba Concepcion, PT HNNXLEO [address]   2/28/2020  1:15 PM No [place] OVJIQQH [address]

## 2020-01-24 ENCOUNTER — HOSPITAL ENCOUNTER (OUTPATIENT)
Dept: PHYSICAL THERAPY | Age: 65
Discharge: HOME | End: 2020-01-24
Payer: MEDICARE

## 2020-01-24 PROCEDURE — 97112 NEUROMUSCULAR REEDUCATION: CPT

## 2020-01-24 PROCEDURE — 97110 THERAPEUTIC EXERCISES: CPT

## 2020-01-24 PROCEDURE — 97530 THERAPEUTIC ACTIVITIES: CPT

## 2020-01-24 NOTE — PROGRESS NOTES
OT DAILY TREATMENT NOTE 10-18    Patient Name: Yordan Parker  Date:2020  : 1955  [x]  Patient  Verified  Payor: [place] MEDICARE / Plan: [place] MEDICARE PART A & B / Product Type: Medicare /    In time:115  Out time:200  Total Treatment Time (min): 45  Visit #: 9 of 12    Medicare/BCBS Only   Total Timed Codes (min):  45 1:1 Treatment Time:  45     Treatment Area: Upper extremity weakness [R29.898]  Cerebral infarction, unspecified [I63.9]    SUBJECTIVE  Pain Level (0-10 scale): 0/10  Any medication changes, allergies to medications, adverse drug reactions, diagnosis change, or new procedure performed?: [x] No    [] Yes (see summary sheet for update)  Subjective functional status/changes:   [] No changes reported  When would I come back    OBJECTIVE        15 min Therapeutic Exercise:  [] See flow sheet :   Rationale: increase ROM and increase strength to improve the patients ability to reach  [place]o tree knock off balls level 1 in standing uisng shoulder and elbow flex x 10  Seated attempt at wrist extension  Towel slide shoulder flexion x 10    15 min Therapeutic Activity:  []  See flow sheet :   Rationale: increase ROM and improve coordination  to improve the patients ability to grasp  Sliding grasp with assit to position ulnar digits, then attempted elbow flexion to bring to mouth       15 min Neuromuscular Re-education:  []  See flow sheet :   Rationale: increase ROM and increase strength  to improve the patients ability to grasp  Matched resistance elbow flex ext  AAROM sup pro no response to pronation      With   [] TE   [] TA   [] neuro   [x] other: Patient Education: [x] Review HEP    [] Progressed/Changed HEP based on: Upcoming discharge and rationale    [] positioning   [] body mechanics   [] transfers   [] heat/ice application   [] Splint wear/care   [] Sensory re-education   [] scar management      [] other:            Other Objective/Functional Measures:   No active wrist extension noted  Inconsistent elbow flexion with shoulder flexion to knock balls  Inconsistent sup with elbow flexed     Pain Level (0-10 scale) post treatment: 0/10    ASSESSMENT/Changes in Function: concerned re future discharge    Patient will continue to benefit from skilled OT services to modify and progress therapeutic interventions, address ROM deficits, address strength deficits, analyze and address soft tissue restrictions, analyze and cue movement patterns and instruct in home and community integration to attain remaining goals. []  See Plan of Care  []  See progress note/recertification  []  See Discharge Summary         Progress towards goals / Updated goals:  *1.   Patient will be able to actively grasp and release light items ith right hand to assist with self care tasks  Current status-grasped sock ball x 1 1/8/20, not able to grasp PVC but not tightly enough to  move PVC on [place]o 1/10/20  Status at last visit: Able to grasp sock ball today 1/6/20, not met 1/22/20     2.  Patient will be able to actively flex shoulder to 45 degrees to improve ability to place arm through sleeve. Current status: Reviewed NMES 1/10/20, met today 50 degrees 1/22/20     3.  Patient will be able to actively extend wrist to posiiton hand for grasp of items. Current status: Active extension not seen 1/8/20, 1/22/20     New goal:   4.  .  Patient will robert able to actively abduct shoulder to put on deodorant.   Status last visit: Palpable middlle deltoid with poor strength 1/6/20, improved in supine 1/8/20, reviewed NMES, abduction 15 1/22/20   **    PLAN  []  Upgrade activities as tolerated     [x]  Continue plan of care  []  Update interventions per flow sheet       []  Discharge due to:_  [x]  Other:_REcheck next week with recert for 4 additional visits      MALLIKA Gomez/L 1/24/2020  3:36 PM    Future Appointments   Date Time Provider Marc Ruvalcaba   1/27/2020  9:45 AM Reji Payton, SLP [facility]   1/27/2020 10:30 AM Mariaelena Lazaro, PT MMCPTPB [facility]   1/27/2020 11:00 AM Hyacinth Mantle RYZCEUF [facility]   1/29/2020 10:45 AM SILVERIO Lake UTLASKZ [facility]   1/29/2020 11:30 AM Izell Hatchet, PTA MMCPTPB [facility]   1/29/2020 12:00 PM Margo Em, OTR/L MMCPTPB [facility]   1/31/2020  3:15 PM Hyacinth Mantle MMCPTPB [facility]   2/3/2020  8:45 AM Hyacinth Mantle WGFEBNM [facility]   2/3/2020 10:00 AM Izell Hatchet, PTA MMCPTPB [facility]   2/3/2020 10:30 AM SILVERIO Lake MMCPTPB [facility]   2/5/2020  8:00 AM Hyacinth Mantle GQYORZS [facility]   2/5/2020  9:00 AM SILVERIO Lake ETFJOOJ [facility]   2/5/2020 10:00 AM Izell Hatchet, PTA MMCPTPB [facility]   2/7/2020  9:00 AM Hyacinth Mantle WJBCOVL [facility]   2/10/2020  8:30 AM Fredo Rivas MD [address]   2/10/2020  9:45 AM SILVERIO Noel TSGPISD [facility]   2/10/2020 11:00 AM Hyacinth Mantle APQJJLN [facility]   2/10/2020 12:00 PM Izell Hatchet, PTA MMCPTPB [facility]   2/11/2020  9:15 AM SILVERIO Noel RTPSGAA [facility]   2/11/2020 10:00 AM Paula Diegoet, PTA MMCPTPB [facility]   2/11/2020 10:45 AM Margo Em, OTR/L MMCPTPB [facility]   2/14/2020 10:15 AM Fabiana Vaughn MD [facility]   2/14/2020  1:15 PM Hyacinth Mantle QRFQGZR [facility]   2/17/2020  8:00 AM Hyacinth Mantle HIGYENC [facility]   2/17/2020  9:00 AM Izell Hatchet, PTA MMCPTPB [facility]   2/17/2020  9:45 AM SILVERIO Noel [facility]   2/19/2020  8:00 AM Hyacinth Mantle CNHOSJG [facility]   2/19/2020  9:00 AM SILVERIO Jean-Baptiste MMCPTPB [facility]   2/19/2020 10:00 AM Izell Hatchet, PTA MMCPTPB [facility]   2/21/2020  9:00 AM Hyacinth Mantle DEXXNQD [facility]   2/24/2020  8:45 AM Hyacinth Mantle MMCPTPB [facility]   2/24/2020  9:45 AM SILVERIO Noel MMCPTPB [facility]   2/24/2020 10:30 AM Izell Hatchet, PTA MMCPTPB [facility]   2/26/2020  8:00 AM Hyacinth Mantle FQZMGKL [facility]   2/26/2020  9:00 AM SILVERIO [place] UFWWUNA [facility]   2/26/2020 10:30 AM Mariaelena Lazaro, PT YWOFMAS [facility]   2/28/2020  1:15 PM Hyacinth Cortes CLPSQRL [facility]

## 2020-01-27 ENCOUNTER — APPOINTMENT (OUTPATIENT)
Dept: PHYSICAL THERAPY | Age: 65
End: 2020-01-27
Payer: MEDICARE

## 2020-01-27 ENCOUNTER — HOSPITAL ENCOUNTER (OUTPATIENT)
Dept: PHYSICAL THERAPY | Age: 65
Discharge: HOME | End: 2020-01-27
Payer: MEDICARE

## 2020-01-27 PROCEDURE — 92507 TX SP LANG VOICE COMM INDIV: CPT

## 2020-01-27 PROCEDURE — 97110 THERAPEUTIC EXERCISES: CPT

## 2020-01-27 PROCEDURE — 97112 NEUROMUSCULAR REEDUCATION: CPT

## 2020-01-27 PROCEDURE — 92526 ORAL FUNCTION THERAPY: CPT

## 2020-01-27 PROCEDURE — 97763 ORTHC/PROSTC MGMT SBSQ ENC: CPT

## 2020-01-27 NOTE — PROGRESS NOTES
PT DAILY TREATMENT NOTE 10-18    Patient Name: Jack Durán  Date:2020  : 1955  [x]  Patient  Verified  Payor: VA MEDICARE / Plan: VA MEDICARE PART A & B / Product Type: Medicare /    In time:1029  Out time:1101  Total Treatment Time (min): 32  Visit #: 10 of 12    Medicare/BCBS Only   Total Timed Codes (min):  32 1:1 Treatment Time:  32       Treatment Area: Lower extremity weakness [R29.898]  Cerebral infarction, unspecified [I63.9]    SUBJECTIVE  Pain Level (0-10 scale): 0/10  Any medication changes, allergies to medications, adverse drug reactions, diagnosis change, or new procedure performed?: [x] No    [] Yes (see summary sheet for update)  Subjective functional status/changes:   [] No changes reported  Reports he has been more careful walking and taking shorter steps. OBJECTIVE      32 min Therapeutic Exercise:  [] See flow sheet :   Rationale: increase ROM, increase strength, improve coordination, improve balance and increase proprioception to improve the patients ability to ease with ADL's      With   [] TE   [] TA   [] neuro   [] other: Patient Education: [x] Review HEP    [] Progressed/Changed HEP based on:   [] positioning   [] body mechanics   [] transfers   [] heat/ice application    [] other:      Other Objective/Functional Measures: Worked on sit/stand balance transfers w/o UE on foam from high/low table. Working on improving pelvic elevation and circumduction of   right LE for step taps. MSR EC x 30 sec, no LOB  Pain Level (0-10 scale) post treatment: 0/10    ASSESSMENT/Changes in Function: Progressing well. Working on gait mechanics with increasing weight bearing onto his right LE in stance. Able to bend over and fix his AFO w/o LOB after his session today.     Patient will continue to benefit from skilled PT services to modify and progress therapeutic interventions, address functional mobility deficits, address ROM deficits, address strength deficits, analyze and address soft tissue restrictions, analyze and cue movement patterns, analyze and modify body mechanics/ergonomics and assess and modify postural abnormalities to attain remaining goals. []  See Plan of Care  [x]  See progress note/recertification  []  See Discharge Summary         Progress towards goals / Updated goals:  1. Pt will ambulate 4 steps x 3 with 1 HR with supervision to be able to navigate stairs at home.   2. Pt will increase FOTO score by 7 points in order to demonstrate improved functional ability   3. Pt will increase right hamstring strength to 2+/5 in order to improve right foot clearance and normalize gait pattern for increased ambulatory tolerance and reduced fall risk.   Progressing. 2-/5  4. Pt will demonstrate Romberg EC on a compliant surface for 15 seconds without LOB in order to demonstrate improved stability in poorly lit environments.   Progressing. 1/13/20  5. Pt will ambulate >150' with SPC and no evidence of instability in order to improve ease of household negotiation with LRAD.             CRICKET well. Pt is ambulating with SPC in clinic.  1/8/20    PLAN  [x]  Upgrade activities as tolerated     [x]  Continue plan of care  []  Update interventions per flow sheet       []  Discharge due to:_  []  Other:_      Tye Murrieta, PT 1/27/2020  9:05 AM    Future Appointments   Date Time Provider Marc Ruvalcaba   1/27/2020  9:45 AM SILVERIO Mackenzie QCHDRFM [facility]   1/27/2020 10:30 AM Dominik Darden PT MMCPTPB [facility]   1/27/2020 11:00 AM Chet HINDS [facility]   1/29/2020 10:45 AM SILVERIO Mackenzie FCYKFVZ [facility]   1/29/2020 11:30 AM Elena Palencia PTA MMCPTPB [facility]   1/29/2020 12:00 PM Floridalma Amaya OTR/L MMCPTPB [facility]   1/31/2020  3:15 PM Chet Robertson JGQDNCL [facility]   2/3/2020  8:45 AM Chet Robertson GSJGENG [facility]   2/3/2020 10:00 AM Elena Palencia PTA MMCPTPB [facility]   2/3/2020 10:30 AM SILVERIO Lynch WMIJPDV [facility]   2/5/2020  8:00 AM Chet Robertson FDSWZBP [facility] 2/5/2020  9:00 AM SILVERIO Mancera MMCPTPB [facility]   2/5/2020 10:00 AM Jeromy Lora, PTA MMCPTPB [facility]   2/7/2020  9:00 AM Cathy BOYKIN [facility]   2/10/2020  8:30 AM Blue Francis MD [address]   2/10/2020  9:45 AM SILVERIO Proctor IMMWOYI [facility]   2/10/2020 11:00 AM Cathy NASHUUGAGAN [facility]   2/10/2020 12:00 PM Jeromy Lora, LINDA MMCPTPB [facility]   2/11/2020  9:15 AM SILVERIO Proctor INSGLAF [facility]   2/11/2020 10:00 AM Jeromy Lora, PTA MMCPTPB [facility]   2/11/2020 10:45 AM Amaury Mejia OTR/ROVERTO MMCPTPB [facility]   2/14/2020 10:15 AM Vivek Guillory MD [facility]   2/14/2020  1:15 PM Cathy Warner SIIZWLB [facility]   2/17/2020  8:00 AM Cathy Warner KZGKJTG [facility]   2/17/2020  9:00 AM Jeromy Lora, PTA MMCPTPB [facility]   2/17/2020  9:45 AM SILVERIO Proctor PTDSIAP [facility]   2/19/2020  8:00 AM Cathy GALLAGHER [facility]   2/19/2020  9:00 AM SILVERIO Arzate MMCPTPB [facility]   2/19/2020 10:00 AM Jeromy Lora, LINDA MMCPTPB [facility]   2/21/2020  9:00 AM Cathy Warner KMBEJEZ [facility]   2/24/2020  8:45 AM Cathy Warner MMCPTIMTIAZ [facility]   2/24/2020  9:45 AM SILVERIO Proctor MMCPTPB [facility]   2/24/2020 10:30 AM Jeromy Lora, LINDA MMCPTPB [facility]   2/26/2020  8:00 AM Cathy Warner BDHABRYAN [facility]   2/26/2020  9:00 AM SILVERIO Mancera DZFHEPZ [facility]   2/26/2020 10:30 AM SUKHWINDER Love [facility]   2/28/2020  1:15 PM Cathy Warner YGTXNTI [facility]

## 2020-01-27 NOTE — PROGRESS NOTES
OT DAILY TREATMENT NOTE 10-18    Patient Name: Yordan Parker  Date:2020  : 1955  [x]  Patient  Verified  Payor: VA MEDICARE / Plan: VA MEDICARE PART A & B / Product Type: Medicare /    In time:1102  Out time:1150  Total Treatment Time (min): 48  Visit #: 10 of 12    Medicare/BCBS Only   Total Timed Codes (min):  48 1:1 Treatment Time:  48     Treatment Area: Upper extremity weakness [R29.898]  Cerebral infarction, unspecified [I63.9]    SUBJECTIVE  Pain Level (0-10 scale): 0/10  Any medication changes, allergies to medications, adverse drug reactions, diagnosis change, or new procedure performed?: [x] No    [] Yes (see summary sheet for update)  Subjective functional status/changes:   [] No changes reported  So how many more do I have before they bump me down?     OBJECTIVE    24 min Therapeutic Exercise:  [] See flow sheet :   Rationale: increase ROM and increase strength to improve the patients ability to reach    Recheck Goals for PN  Shoulder Abduction  Shoulder Flexion  Family Training with Wife on HEP for UE ROM    14 min Neuromuscular Re-education:  []  See flow sheet :   Rationale: increase ROM, increase strength and improve coordination  to improve the patients ability to grasp    Recheck Goals for PN  Recip Sup/Pro  Review Weightbearing for upcoming DC  Family Training for recip/sup HEP   Review NMES use    10 min Orthotic/Splinting: AFO Adjustment   Rationale: Replace velcro/straps  to improve the patients ability to perform functional ambulation with increased safety when performing ADL/IADL's    Replaced velcro and straps on AFO      With   [] TE   [] TA   [] neuro   [] other: Patient Education: [x] Review HEP    [] Progressed/Changed HEP based on:   [] positioning   [] body mechanics   [] transfers   [] heat/ice application   [] Splint wear/care   [] Sensory re-education   [] scar management      [] other:            Other Objective/Functional Measures:     Shoulder Flexion: 55 (50)   Shoulder Abduction: 25 (15)    Neuro 82% (58%)    Pain Level (0-10 scale) post treatment: 0/10    ASSESSMENT/Changes in Function: Aware of upcoming DC, still no response to wrist extension    Patient will continue to benefit from skilled OT services to modify and progress therapeutic interventions, address ROM deficits, address strength deficits and instruct in home and community integration to attain remaining goals. []  See Plan of Care  []  See progress note/recertification  []  See Discharge Summary         Progress towards goals / Updated goals:  1.   Patient will be able to actively grasp and release light items ith right hand to assist with self care tasks  Current status-grasped sock ball x 1 1/8/20, not able to grasp PVC but not tightly enough to  move PVC on [place] 1/10/20  Status at last visit: Able to grasp sock ball today 1/6/20, not met 1/22/20     2.  Patient will be able to actively flex shoulder to 45 degrees to improve ability to place arm through sleeve. Current status: Met, 55  1/27/20     3.  Patient will be able to actively extend wrist to posiiton hand for grasp of items. Current status: Active extension not seen 1/27/20     New goal:   4.  .  Patient will robert able to actively abduct shoulder to put on deodorant.   Status last visit: Met per patient/wife report     Abduction 25 1/27/20    PLAN  []  Upgrade activities as tolerated     [x]  Continue plan of care  []  Update interventions per flow sheet       []  Discharge due to:_  [x]  Other:_  Discharge in 4 visits     JUSTIN Morel/L 1/27/2020  9:58 AM    Future Appointments   Date Time Provider Marc Ruvalcaba   1/27/2020 10:30 AM Keely Pham PT MMCPTPB [facility]   1/27/2020 11:00 AM Columba Calvillo OSRVSXB [facility]   1/29/2020 10:45 AM Maeola Kawasaki, SLP YNSOUQW [facility]   1/29/2020 11:30 AM Yuly Branch PTA MMCPTPB [facility]   1/29/2020 12:00 PM Mindy Corti, OTR/L [facility]B [facility]   1/31/2020  3:15 PM Columba Calvillo SQEFHLM [facility]   2/3/2020  8:45 AM Makenzie Janina IDDIQBL [facility]   2/3/2020 10:00 AM Patricia Seeds, PTA MMCPTPB [facility]   2/3/2020 10:30 AM Ricarda Lewis I, SLP FXFVPQF [facility]   2/5/2020  8:00 AM Makenzie Janina DQIUOAL [facility]   2/5/2020  9:00 AM Ricarda Lewis I, SLP RHRSTVQ [facility]   2/5/2020 10:00 AM Patricia Seeds, PTA MMCPTPB [facility]   2/7/2020  9:00 AM Makenzie Janina MMCPTPB [facility]   2/10/2020  8:30 AM German Montiel MD [address] [facility]   2/10/2020  9:45 AM SILVERIO Paulino KTUNTLJ [facility]   2/10/2020 11:00 AM Makenzie Janina CNXZLLA [facility]   2/10/2020 12:00 PM Patricia Seeds, PTA MMCPTPB [facility]   2/11/2020  9:15 AM SILVERIO Paulino IKCDTMH [facility]   2/11/2020 10:00 AM Patricia Seeds, PTA MMCPTPB [facility]   2/11/2020 10:45 AM MALLIKA Gill/L MMCPTPB [facility]   2/14/2020 10:15 AM Norbert Au MD [facility]   2/14/2020  1:15 PM Makenzie Janina QTGPAVE [facility]   2/17/2020  8:00 AM Makenzie Janina XEJEHUJ [facility]   2/17/2020  9:00 AM Patricia Seeds, PTA MMCPTPB [facility]   2/17/2020  9:45 AM SILVERIO Paulino JNRXCJQ [facility]   2/19/2020  8:00 AM Makenzie Janina NOUQJDF [facility]   2/19/2020  9:00 AM Criss Fletcher, SILVERIO MMCPTPB [facility]   2/19/2020 10:00 AM Patricia Seeds, PTA MMCPTPB [facility]   2/21/2020  9:00 AM Makenzie Janina IFPANQL [facility]   2/24/2020  8:45 AM Makenzie Janina MMCPTPB [facility]   2/24/2020  9:45 AM SILVERIO Paulino MMCPTPB [facility]   2/24/2020 10:30 AM Patricia Seeds, PTA MMCPTPB [facility]   2/26/2020  8:00 AM Makenzie Janina TKIHVMK [facility]   2/26/2020  9:00 AM Ricarda Lewis I, SLP NERKQZC [facility]   2/26/2020 10:30 AM Renee Villalobos, PT GJAUHNQ [facility]   2/28/2020  1:15 PM Makenzie Janina CPBJLJL [facility]

## 2020-01-27 NOTE — PROGRESS NOTES
[facility]  [address] [place] [address]  [phone] fax    Continued Plan of Care/ Re-certification for Physical Therapy Services      Patient name: Maxi Payer Start of Care: 10/22/19   Referral source: Vita Ventura NP : 1955   Medical/Treatment Diagnosis: Lower extremity weakness [R29.898]  Hemiplegia, unspecified affecting right dominant side [G81.91]  Payor: [place] MEDICARE / Plan: VA MEDICARE PART A & B / Product Type: Medicare /  Onset Date:19      Prior Hospitalization: see medical history Provider#: [id number]   Medications: Verified on Patient Summary List      Comorbidities: Diabetes, HBP, OA of right Ankle,Kidney disease, Dysphagia and /Dysarthria   Prior Level of Function: Retired [employer]. Likes to attend [employer] reinactments,      Visits from Start of Care: 30    Missed Visits: 0    The Plan of Care and following information is based on the patient's current status:  Goal: Pt will ambulate 4 steps x 3 with 1 HR with supervision to be able to navigate stairs at home.   Status at last note/certification: Requires CGA and 2 HR for step ups. Current Status: Progressing. Pt can navigate a 6 inch step with 1 HR and W/O LOB. Goal:Pt will increase right hamstring strength to 2+/5 in order to improve right foot clearance and normalize gait pattern for increased ambulatory tolerance and reduced fall risk.   Status at last note/certification:MMT=2-/5  Current Status: Not met/Progressing. MMt=2-/5    Goal:Pt will demonstrate Romberg EC on a compliant surface for 15 seconds without LOB in order to demonstrate improved stability in poorly lit environments.   Status at last note/certification:  Current Status: Progressing. Goal: Pt will ambulate >150' with SPC and no evidence of instability in order to improve ease of household negotiation with LRAD.   Status at last note/certification:Pt ambulates mostly with a SBQC. Current Status: met. Pt ambulates in clinic with a SPC most times and w/o LOB with SBA. Goal: Pt will increase FOTO score by 7 points in order to demonstrate improved functional ability   Status at last note/certificationFOTO=46  Current Status: Not met. FOTO=40      Key functional changes: Pt is ambulating with SPC in the clinic and uses his Quad cane at home. He has sustained 1 fall recently due to a trip over a door threshold at his house. He did not sustain any injuries. Pt is compliant with HEP and with attendance and is steadily working towards independent function. Sit to stand W/O UE support x 10 on an unstable surface , no LOB. MSR EO x 30 sec, No LOB  Ambulate with SPC x 100 ft, with improved gait mechanics,SBA, no LOB. Problems/ barriers to goal attainment: Dynamic  Balance Challenges. Problem List: pain affecting function, decrease ROM, decrease strength, impaired gait/ balance, decrease ADL/ functional abilitiies, decrease activity tolerance, decrease flexibility/ joint mobility and decrease transfer abilities    Treatment Plan: Therapeutic exercise, Therapeutic activities, Neuromuscular re-education, Physical agent/modality, Gait/balance training, Manual therapy, Patient education, Self Care training, Functional mobility training, Home safety training and Stair training     Patient Goal (s) has been updated and includes: Increasing ambulation function and balance. Goals for this certification period to be accomplished in 4 weeks:  1. Pt will ambulate 4 steps x 3 with 1 HR with supervision to be able to navigate stairs at home.   2. Pt will ambulate >200 ' with [facility] and no evidence of instability in order to improve ease of household negotiation with LRAD. 3. Pt will ambulate outdoors on grass, curbs, with SBA/Supervision w/o LOB to be DC'd to negotiating household and short community distances safely at D/C.      Frequency / Duration: Patient to be seen 2 times per week for 4 weeks:    Assessment / Recommendations:Progressing steadily and slowly. Pt will work on decreasing step length and slowing down and focus more on hip flexion/ ankle DF during gait cycle. Pt Will continue to work on progressing to stair ambulation with Supervision to be able to be a safe household Ambulator. Pt continues to have ongoing synergy pattern with decreased heel strike. Pt will benefit from continued skilled PT to address strength, ROM, flexibility, balance, and functional mobility deficits in order to maximize independence, improve ease/safety with ambulation, and improve QOL. Certification Period: 1/18/20-2/16/20    Christopher Osborne, PT 1/27/2020 12:25 PM    ________________________________________________________________________  I certify that the above Therapy Services are being furnished while the patient is under my care. I agree with the treatment plan and certify that this therapy is necessary. [] I have read the above and request that my patient continue as recommended.   [] I have read the above report and request that my patient continue therapy with the following changes/special instructions: _______________________________________  [] I have read the above report and request that my patient be discharged from therapy    Physician's Signature:____________Date:_________TIME:________    ** Signature, Date and Time must be completed for valid certification **    Please sign and return to [facility]  [address] [place] [address]  [phone] fax

## 2020-01-27 NOTE — PROGRESS NOTES
[facility]  [address] [place] [address]  [phone] fax    Continued Plan of Care/ Re-certification for Occupational Therapy Services    Patient name: Nora Venegas Start of Care: 10/22/19   Referral source: JENNIFER Pinon NP : 1955   Medical/Treatment Diagnosis: Upper extremity weakness [R29.898]  Cerebral infarction, unspecified [I63.9]  Payor: VA MEDICARE / Plan: VA MEDICARE PART A & B / Product Type: Medicare /  Pickens Radha DWZV:2.82.43     Prior Hospitalization: see medical history Provider#: [id number]   Medications: Verified on Patient Summary List    Comorbidities: *Depression, HTN, DM, arthritis**  Prior Level of Function:*REtired from [employer], I self care yard care driving, part time parts delivery, carpentry work  Visits from [facility] Care: 40    Missed Visits: 0    The Plan of Care and following information is based on the patient's current status:  Current ROM listed below with prior in ( )  Shoulder Flexion: 55            (30        Shoulder Abduction: 25       (15)     Neuro  Quality of Life UE limitation 82%     (58%)    1.   Patient will be able to actively grasp and release light items ith right hand to assist with self care tasks  Status at last note: Able to grasp 1 inch dowel but no active release  Current Status:  not met 20     2.  Patient will be able to actively flex shoulder to 45 degrees to improve ability to place arm through sleeve. Status a last note: Shoulder flexion 30 degrees  Current status: Met, now 55  20     3.  Patient will be able to actively extend wrist to posiiton hand for grasp of items. Status at last note: No active extension   Current status: No active extension      New goal:   4.  .  Patient will robert able to actively abduct shoulder to put on deodorant.   Status at last note: ABduction 15  Current status Met per patient/wife report     Abduction now 25     Key functional changes: Improved active shoulder flexion and abduction. Still no active wrist extension or digit activity when not using NMES. Improvement appears to be plateauing. Problems/ barriers to goal attainment: none     Treatment Plan: Therapeutic exercise, Therapeutic activities, Neuromuscular re-education, Physical agent/modality and Patient education      Patient Goal (s) has been updated and includes: Be able to drive     Goals for this certification period to be accomplished in 2 weeks:  1. Patient will be independent and compliant with updated HEP to retain gains made and progress functional recovery. 2.  Patient will be referred to CVA support group for additional information on driving evaluation      Frequency / Duration: Patient to be seen 2 times per week for 4 treatments:    Assessment / Recommendations:Patient has made additional progress in proximal motion but continues ot lack distal recovery for funcitonal right UE for grasp and release. He is independent an compliant with NMES at home. Certification Period: 1/28/20-2/26/20    RICHELLE Velasco 1/27/2020 2:12 PM    ________________________________________________________________________  I certify that the above Therapy Services are being furnished while the patient is under my care. I agree with the treatment plan and certify that this therapy is necessary.       Physician's Signature:____________Date:_________TIME:________    ** Signature, Date and Time must be completed for valid certification **      Please sign and return to [facility] Jose Alfredo CHAY  [address] [place] [address]            [phone] fax

## 2020-01-29 ENCOUNTER — HOSPITAL ENCOUNTER (OUTPATIENT)
Dept: PHYSICAL THERAPY | Age: 65
Discharge: HOME | End: 2020-01-29
Payer: MEDICARE

## 2020-01-29 PROCEDURE — 97530 THERAPEUTIC ACTIVITIES: CPT

## 2020-01-29 PROCEDURE — 97112 NEUROMUSCULAR REEDUCATION: CPT

## 2020-01-29 PROCEDURE — 92526 ORAL FUNCTION THERAPY: CPT

## 2020-01-29 PROCEDURE — 92507 TX SP LANG VOICE COMM INDIV: CPT

## 2020-01-29 NOTE — PROGRESS NOTES
OT DAILY TREATMENT NOTE 10-18    Patient Name: Baylee Purchase  Date:2020  : 1955  [x]  Patient  Verified  Payor: VA MEDICARE / Plan: VA MEDICARE PART A & B / Product Type: Medicare /    In time:1200  Out time:1238  Total Treatment Time (min): 38  Visit #: 1 of 4    Medicare/BCBS Only   Total Timed Codes (min):  38 1:1 Treatment Time:  38     Treatment Area: Upper extremity weakness [R29.898]  Cerebral infarction, unspecified [I63.9]    SUBJECTIVE  Pain Level (0-10 scale): 0/10  Any medication changes, allergies to medications, adverse drug reactions, diagnosis change, or new procedure performed?: [x] No    [] Yes (see summary sheet for update)  Subjective functional status/changes:   [] No changes reported  I don't know why, but I can't do it now( fatigue)    OBJECTIVE      15 min Therapeutic Activity:  []  See flow sheet :   Rationale: increase ROM and improve coordination  to improve the patients ability to pro sup with cup  Cone pro sup x 10  Attempted sock grasp unable  Saebo tree shoulder elbow push rings off tree level 1 x 10     2*3 min Neuromuscular Re-education:  []  See flow sheet :   Rationale: increase ROM and increase strength  to improve the patients ability to move right UE freely  Seated recip shoulder flex ext, elbow flex ext and sup pro right UE x 10  PNF ext pattern with guiding x 10        With   [] TE   [] TA   [] neuro   [] other: Patient Education: [x] Review HEP    [] Progressed/Changed HEP based on:   [] positioning   [] body mechanics   [] transfers   [] heat/ice application   [] Splint wear/care   [] Sensory re-education   [] scar management      [] other:            Other Objective/Functional Measures:   Scap elevation when attempting pronation   No response to quick stretch to wrist    Pain Level (0-10 scale) post treatment: */10**    ASSESSMENT/Changes in Function: REmains unable to elicit wrist movement    Patient will continue to benefit from skilled OT services to modify and progress therapeutic interventions, address ROM deficits, address strength deficits, analyze and address soft tissue restrictions, analyze and cue movement patterns and instruct in home and community integration to attain remaining goals. []  See Plan of Care  []  See progress note/recertification  []  See Discharge Summary         Progress towards goals / Updated goals:  1. Patient will be independent and compliant with updated HEP to retain gains made and progress functional recovery.    2.  Patient will be referred to CVA support group for additional information on driving evaluation      PLAN  []  Upgrade activities as tolerated     []  Continue plan of care  []  Update interventions per flow sheet       []  Discharge due to:_  []  Other:_      Shemar Ortega OTR/L 1/29/2020  12:06 PM    Future Appointments   Date Time Provider Marc Ruvalcaba   1/31/2020  3:15 PM Marcin Knee OLJBFLS [facility]   2/3/2020  8:45 AM Sofiya KarimiBDYL [facility]   2/3/2020 10:00 AM Chip Shi PTA MMCPTPB [facility]   2/3/2020 10:30 AM SILVERIO Nugent MMCPTPB [facility]   2/5/2020  8:00 AM Marcin Sheppard LSMOENQ [facility]   2/5/2020  9:00 AM Chip Shi PTA MMCPTPB [facility]   2/7/2020  9:00 AM Marcin Knee WFTBTDW [facility]   2/10/2020  8:30 AM Claudell Catching, MD [address]   2/10/2020 11:00 AM Marcin Knee BZQOXWF [facility]   2/10/2020 12:00 PM Chip Shi PTA MMCPTPB [facility]   2/11/2020 10:00 AM Chip Shi PTA MMCPTPB [facility]   2/11/2020 10:45 AM Julaine Philip, OTR/L MMCPTPB [facility]   2/14/2020 10:15 AM Wali Malin MD [facility]   2/14/2020  1:15 PM Burch Knee ZPIFTTJ [facility]   2/17/2020  8:00 AM Burch Knee MMCPTPB [facility]   2/17/2020  9:00 AM Chip Shi PTA MMCPTPB [facility]   2/19/2020  8:00 AM Burch Knee BOFLRRN [facility]   2/19/2020  9:00 AM Greta Paul, PT MMCPTPB [facility]   2/21/2020  9:00 AM Burch Knee JXQDOOK [facility]   2/24/2020  8:45 AM Burch Knee COFEIMV [facility]   2/24/2020 9:30 AM Dave Salamanca, PTA MMCPTPB [facility]   2/26/2020  8:00 AM Sawyer Asencio JGOJMLP [facility]   2/26/2020  9:00 AM Dave Salamanca, PTA MMCPTPB [facility]   2/28/2020  1:15 PM Sawyer Asencio MMCPTPB [facility]

## 2020-01-29 NOTE — PROGRESS NOTES
ST DAILY TREATMENT NOTE    Patient Name: Phyllis Hines  Date:2020  : 1955  [x]  Patient  Verified  Payor: Payor: VA MEDICARE / Plan: VA MEDICARE PART A & B / Product Type: Medicare /   In time: 10:51  Out time: 11:30  Total Treatment Time (min): 39  Visit #: 4 of 8    Treatment Diagnosis: Dysarthria and anarthria [R47.1] Oropharyngeal dysphagia [R13.12]    SUBJECTIVE  Pain Level (0-10 scale): 0  Any medication changes, allergies to medications, adverse drug reactions, diagnosis change, or new procedure performed?: [x] No    [] Yes (see summary sheet for update)    Subjective functional status/changes:   [x] No changes reported  \"My speech has been okay at home. I'm trying to use my strategies\"      OBJECTIVE  Treatment provided includes:  Increase/Improve:  []  Voice Quality []  Cognitive Linguistic Skills [x]  Laryngeal/Pharyngeal Exercises   []  Vocal Loudness []  Reading Comprehension [x]  Swallowing Skills    []  Vocal Cord Function []  Auditory Comprehension []  Oral Motor Skills   []  Resonance []  Writing Skills [x]  Compensatory strategies    [x]  Speech Intelligibility []  Expressive Language []  Attention   [x]  Breath Support/Coord.  []  Receptive language []  Memory   [x]  Articulation []  Safety Awareness [x] pt education    []  Fluency []  Word Retrieval []        Treatment Provided:  - pt education for discharge instructions, recommendations, and HEP   - oropharyngeal strengthening exercises   - diaphragmatic breathing exercises   - /s/ /l/ and /th/ productions in structured sentence level and at the conversational level     Patient/Caregiver  Education: [x] Review HEP      HEP/Handouts given: speech and swallowing discharge instructions; speech HEP; oropharyngeal HEP     Pain Level (0-10 scale) post treatment: 0    ASSESSMENT   See summary   []   Improving appropriately and progressing toward goals  []   Improving slowly and progressing toward goals  [x]   Approximating goals/maximum potential  []   Continues to benefit from skilled therapy to address remaining functional deficits  []   Not progressing toward goals and plan of care will be adjusted    Patient will continue to benefit from skilled therapy to address remaining functional deficits: dysarthria dysphagia      Progress towards goals / Updated goals:  1. The pt will increase quality and length of phonation by sustaining ah utilizing effective diaphragmatic breath support for >15 seconds when provided with min-modA in 3/3 sessions to increase functional speech intelligibility. 1/29/2020: min-modA 80% of trials   2. The patient will articulate (s/z) consonants at the sentence to conversational level with 85% accuracy with use of compensatory strategies and OMES  to improve speech intelligibility when provided with min cues. 1/29/2020: met: multi-syllable words the sentence level 90% accuracy with Michael and self corrections. Conversational level 85% accuracy with Michael and self correction   3. The patient will articulate  (l, voiced /th/ consonants at the sentence to conversational level  with 90% accuracy with use of compensatory strategies and OMES  to improve speech intelligibility when provided with min cues. 1/29/2020: met: producing sentences containing /l/ and /th/ with 90% accuracy with Michael and self corrections - occasional errors with /l/ blends however pt continues to demonstrate improved self monitoring and correction. /th/ 95% accuracy at the sentence level.  Minimal noted errors at the conversational level for /l/ and /th/ productions   4.  Pt will demonstrate adequate vocal intensity in varying levels from whisper to yelling with 85% accuracy in structured tasks at the sentence to conversational Carlen Palm provided with min cues  1/29/2020: met: sentence level during structure task with 90% accuracy with Michael and at the conversational ; level with 85% accuracy with Michael   5. Patient will complete tongue base retraction,and laryngeal/pharyngeal strengthening exercises x25 (including Sheyla maneuver, Mendelsohn maneuver, modified Shaker with CTAR ball, hard /k/ in isolation,  effortful swallow, etc.), with min cues in 4/4 sessions in order to improve the strength/agility/efficiency of his swallow for improved swallowing safety and QOL.  1/29/2020: not met, approaching goal: 2 of 4 sessions met: effortful swallow x25 with Michael; sheyla x25 with S; mendelsohn x25 with Michael    PLAN  []  Continue plan of care  []  Modify Goals/Treatment Plan      [x]  Discharge due to: approximating max potential / goal achievement   [] Other:    SILVERIO Valencia 1/29/2020  10:49 AM    Future Appointments   Date Time Provider Marc Ruvalcaba   1/29/2020 11:30 AM Jeremy Dickensf, PTA MMCPTPB [facility]   1/29/2020 12:00 PM MALLIKA Portillo/L MMCPTPB [facility]   1/31/2020  3:15 PM Gulshan Late ONDQCQT [facility]   2/3/2020  8:45 AM [place] Late LJKECKS [facility]   2/3/2020 10:00 AM Jeremy Dickensf, PTA MMCPTPB [facility]   2/3/2020 10:30 AM SILVERIO Martinez MMCPTPB [facility]   2/5/2020  8:00 AM Gulshan Late SBNSQVI [facility]   2/5/2020  9:00 AM Jeremy Muñoz, PTA MMCPTPB [facility]   2/7/2020  9:00 AM Gulshan Late ENJQMMJ [facility]   2/10/2020  8:30 AM Millie Sloan MD [address]   2/10/2020 11:00 AM [place] Late CJCMVJX [facility]   2/10/2020 12:00 PM Jeremy Dickensf, PTA MMCPTPB [facility]   2/11/2020 10:00 AM Jeremy Muñoz, PTA MMCPTPB [facility]   2/11/2020 10:45 AM David Lopez, OTR/L MMCPTPB [facility]   2/14/2020 10:15 AM Anya Guthrie MD [facility]   2/14/2020  1:15 PM [place] Late CSRDEJM [facility]   2/17/2020  8:00 AM Gulshan Late WFTKVTZ [facility]   2/17/2020  9:00 AM Jeremy Muñoz, PTA MMCPTPB [facility]   2/19/2020  8:00 AM [place] Late XTGVOGG [facility]   2/19/2020  9:00 AM Mert Peoples, PT MMCPTPB [facility]   2/21/2020  9:00 AM [place] Late RKRVXXQ [facility]   2/24/2020  8:45 AM Gulshan Late VITVHRA [facility]   2/24/2020  9:30 AM Jeremy Muñoz, PTA MMCPTPB [address]   2/26/2020  8:00 AM Abbe Dickson SZJOLFG [address]   2/26/2020  9:00 AM Nader Bangura PTA MMCPTPB [address]   2/28/2020  1:15 PM Abbe Dickson TKOTYTY [address]

## 2020-01-29 NOTE — PROGRESS NOTES
[facility] [place] [employer] [facility]  [place]  [address] [place] [address]  [phone] fax    Speech Therapy Discharge Summary    Patient name: Deon Cee Start of Care: 10/23/2019   Referral source: Navjot Crockett NP : 1955               Medical Diagnosis: Cerebral infarction, unspecified [I63.9]  Payor: VA MEDICARE / Plan: VA MEDICARE PART A & B / Product Type: Medicare /  Onset Date:2019               Treatment Diagnosis: R13.12 Oropharyngeal dysphagia   Prior Hospitalization: see medical history Provider#: [id number]   Medications: Verified on Patient summary List    Comorbidities: Acute Ischemic Stroke (acute/early subacute infarct at the L posterior basal ganglia to corona radiata with residual R hemiparesis), Dysphagia, Dysarthria, Diabetes, Obstructive Sleep Apnea on CPAP, HBP, Kidney Disease  Prior Level of Function: Speech-language, swallowing, cognition, and voice WNL; regular diet with thin liquids    Visits from Start of Care: 33   Missed Visits: 0    Reporting Period : 1/15/2020 to 2020     Summary of Care:  Goal: 1. The pt will increase quality and length of phonation by sustaining ah utilizing effective diaphragmatic breath support for >15 seconds when provided with min-modA in 3/3 sessions to increase functional speech intelligibility. Status at last note/certification: established previous reporting period   Status at discharge: not met, approaching goal: min-modA 80% of trials 1/3 sessions     Goal: 2. The patient will articulate (s/z) consonants at the sentence to conversational level with 85% accuracy with use of compensatory strategies and OMES  to improve speech intelligibility when provided with min cues. Status at last note/certification: established previous reporting period   Status at discharge: met: multi-syllable words the sentence level 90% accuracy with Michael and self corrections.  Conversational level 85% accuracy with Michael and self correction     Goal: 3. The patient will articulate  (l, voiced /th/ consonants at the sentence to conversational level  with 90% accuracy with use of compensatory strategies and OMES  to improve speech intelligibility when provided with min cues. Status at last note/certification: not met, progressing: /th/ with 90% accuracy with Michael in sentences ; /l/ with 80% accuracy with Michael in sentences. Gradual carry over at the conversational level with increase self corrections and monitoring   Status at discharge: met: producing sentences containing /l/ and /th/ with 90% accuracy with Michael and self corrections - occasional errors with /l/ blends however pt continues to demonstrate improved self monitoring and correction. /th/ 95% accuracy at the sentence level. Minimal noted errors at the conversational level for /l/ and /th/ productions    Goal: 4.  Pt will demonstrate adequate vocal intensity in varying levels from whisper to yelling with 85% accuracy in structured tasks at the sentence to conversational Tory Francois provided with min cues  Status at last note/certification: not met, progressing: min-mod cues at the conversational level informally measured at ~80% accuracy with cueing    Status at discharge: met: sentence level during structure task with 90% accuracy with Michael and at the conversational ; level with 85% accuracy with Michael    Goal:  5. Patient will complete tongue base retraction,and laryngeal/pharyngeal strengthening exercises x25 (including Sheyla maneuver, Mendelsohn maneuver, modified Shaker with CTAR ball, hard /k/ in isolation,  effortful swallow, etc.), with min cues in 4/4 sessions in order to improve the strength/agility/efficiency of his swallow for improved swallowing safety and QOL.   Status at last note/certification: established previous reporting period   Status at discharge: not met, approaching goal: 2 of 4 sessions met: effortful swallow x25 with Michael; sheyla x25 with S; mendelsohn x25 with Michael     ASSESSMENT:   Pt made significant gains in speech therapy addressing oropharyngeal dysphagia and dysarthria. Pt has improved efficiency of oropharyngeal swallow and use of compensatory techniques to reduce risk of aspiration PNA. Recommend pt receive modified barium swallow study (MBSS) to further re-evaluate pharyngeal function and appropriate compensatory strategies. Pt has made significant gains in articulatory precision and speech intelligibility with use of compensatory techniques to improve communication. Pt discharged d/t approximating max potential/goal achievement.       RECOMMENDATIONS:  [x]Discontinue therapy: [x]Patient has reached or is progressing toward set goals      []Patient is non-compliant or has abdicated      []Due to lack of appreciable progress towards set SILVERIO Abel 1/29/2020 11:50 AM  MS CCC-SLP  Speech-Language Pathologist

## 2020-01-29 NOTE — PROGRESS NOTES
PT DAILY TREATMENT NOTE 10-18    Patient Name: Reynaldo Byrd  Date:2020  : 1955  [x]  Patient  Verified  Payor: VA MEDICARE / Plan: VA MEDICARE PART A & B / Product Type: Medicare /    In time: 11:30 Out time: 12:00  Total Treatment Time (min): 30  Visit #: 1 of 8    Medicare/BCBS Only   Total Timed Codes (min):  30 1:1 Treatment Time:  30       Treatment Area: Lower extremity weakness [R29.898]  Cerebral infarction, unspecified [I63.9]    SUBJECTIVE  Pain Level (0-10 scale): 0/10  Any medication changes, allergies to medications, adverse drug reactions, diagnosis change, or new procedure performed?: [x] No    [] Yes (see summary sheet for update)  Subjective functional status/changes:   [] No changes reported  Pt reports no pain. He notes just difficulty lifting up his foot at times. He is able to get up off the floor. He hasn't been able to walk outside because it has been cold. OBJECTIVE    30 min Therapeutic Activity:  [x] See flow sheet : stairs and sit to stands   Rationale: increase ROM, increase strength, improve coordination, improve balance and increase proprioception to improve the patients ability to ease with ADL's      With   [] TE   [] TA   [] neuro   [] other: Patient Education: [x] Review HEP    [] Progressed/Changed HEP based on:   [] positioning   [] body mechanics   [] transfers   [] heat/ice application    [] other:      Other Objective/Functional Measures:   Step to step pattern on stairs with left HR  Progressed with cueing to step over step for ascent  Step to step during descent due to right quad weakness  Sit to stands with right weight shift from standard chair without UE assist    Pain Level (0-10 scale) post treatment: 0/10    ASSESSMENT/Changes in Function: Pt progressing towards final goals with working on reciprocal pattern on stairs.  He is able to perform sit to stand transfer without UE assist. Will continue with functional strengthening for remaining visits to improve safety with transfers, stairs and household/community ambulation. Goals for this certification period to be accomplished in 4 weeks:  1. Pt will ambulate 4 steps x 3 with 1 HR with supervision to be able to navigate stairs at home.   2. Pt will ambulate >200 ' with [facility] and no evidence of instability in order to improve ease of household negotiation with LRAD. 3. Pt will ambulate outdoors on grass, curbs, with SBA/Supervision w/o LOB to be DC'd to negotiating household and short community distances safely at D/C.     PLAN  [x]  Upgrade activities as tolerated     [x]  Continue plan of care  []  Update interventions per flow sheet       []  Discharge due to:_  []  Other:_      Lenin Wagner PTA 1/29/2020  9:05 AM    Future Appointments   Date Time Provider Marc Ruvalcaba   1/29/2020 10:45 AM SILVERIO Cleaning [id number] [facility]   1/29/2020 11:30 AM Scout Younger PTA MMCPTPB [facility]   1/29/2020 12:00 PM Claudia Araujo OTR/L MMCPTPB [facility]   1/31/2020  3:15 PM Hoda Beyer XZWPAFT [facility]   2/3/2020  8:45 AM Hoda Beyer KHGTJQG [facility]   2/3/2020 10:00 AM Scout Younger PTA MMCPTPB [facility]   2/3/2020 10:30 AM SILVERIO Stinson MMCPTIMTIAZ [facility]   2/5/2020  8:00 AM Hoda Beyer YEOJHBO [facility]   2/5/2020  9:00 AM SILVERIO Stinson JAVWTFY [facility]   2/5/2020 10:00 AM Scout Younger PTA MMCPTPB [facility]   2/7/2020  9:00 AM Hoda HATFIELDDAQWD [facility]   2/10/2020  8:30 AM Miley Parrish  E Roxi    2/10/2020  9:45 AM Haylee Boykin, SILVERIO DKMVEUB [facility]   2/10/2020 11:00 AM Hoda Beyer RXMKLEF [facility]   2/10/2020 12:00 PM Scout Sida, PTA MMCPTPB [facility]   2/11/2020  9:15 AM SILVERIO Cleaning UTCOAUS [facility]   2/11/2020 10:00 AM Scout Younger, PTA MMCPTPB [facility]   2/11/2020 10:45 AM Claudia Araujo OTR/L MMCPTPB [facility]   2/14/2020 10:15 AM Edger Kanner, MD [facility]   2/14/2020  1:15 PM Hoda Beyer EELYAWB [facility]   2/17/2020  8:00 AM Hoda Beyer DLMLYUB [facility]   2/17/2020 9:00 AM Taisha Harris PTA MMCPTPB [address]   2/17/2020  9:45 AM Martin Dunn, SLP BDSYXBU [address]   2/19/2020  8:00 AM Lorrie Camara ATTIAVU [address]   2/19/2020  9:00 AM Akil Castrejon I, SLP ONYMJBM [address]   2/19/2020 10:00 AM Taisha Harris PTA MMCPTPB [address]   2/21/2020  9:00 AM Lorrie Sanzph TTDXSTK [address]   2/24/2020  8:45 AM Lorrie Sanzph MMCPTPB [address]   2/24/2020  9:45 AM Sarah Jimenes, SLP MMCPTPB [address]   2/24/2020 10:30 AM Taisha Harris, PTA MMCPTPB [address]   2/26/2020  8:00 AM Lorrie Sanzph SGBDBHE [address]   2/26/2020  9:00 AM Akil Castrejon I, SLP DZTSUHO [address]   2/26/2020 10:30 AM Sandra Kennedy, PT JPHMOFL [address]   2/28/2020  1:15 PM Lorrie Camara CCWZBAL [address]

## 2020-01-31 ENCOUNTER — APPOINTMENT (OUTPATIENT)
Dept: PHYSICAL THERAPY | Age: 65
End: 2020-01-31
Payer: MEDICARE

## 2020-02-03 ENCOUNTER — HOSPITAL ENCOUNTER (OUTPATIENT)
Dept: PHYSICAL THERAPY | Age: 65
Discharge: HOME | End: 2020-02-03
Payer: MEDICARE

## 2020-02-03 PROCEDURE — 97110 THERAPEUTIC EXERCISES: CPT

## 2020-02-03 PROCEDURE — 97530 THERAPEUTIC ACTIVITIES: CPT

## 2020-02-03 PROCEDURE — 97112 NEUROMUSCULAR REEDUCATION: CPT

## 2020-02-03 NOTE — PROGRESS NOTES
OT DAILY TREATMENT NOTE 10-18    Patient Name: Joselyn Mejia  Date:2/3/2020  : 1955  [x]  Patient  Verified  Payor: VA MEDICARE / Plan: VA MEDICARE PART A & B / Product Type: Medicare /    In time:843  Out time:922  Total Treatment Time (min): 39  Visit #: 2 of 4    Medicare/BCBS Only   Total Timed Codes (min):  39 1:1 Treatment Time:  39     Treatment Area: Upper extremity weakness [R29.898]  Cerebral infarction, unspecified [I63.9]    SUBJECTIVE  Pain Level (0-10 scale): 0/10  Any medication changes, allergies to medications, adverse drug reactions, diagnosis change, or new procedure performed?: [x] No    [] Yes (see summary sheet for update)  Subjective functional status/changes:   [] No changes reported  I don't know why, my thumb won't open    OBJECTIVE    15 min Therapeutic Activity:  []  See flow sheet :   Rationale: increase ROM and improve coordination  to improve the patients ability to pro/sup with cup    Cone Pronation/Supination  Cone from lap to table 1x  [place]o Tree: Push Rings off level 2 10x    24 min Neuromuscular Re-education:  []  See flow sheet :   Rationale: increase ROM and increase strength  to improve the patients ability to move Right UE freely    SEated REcip Shoulder flex/ext, elbow flex/ext, sup/pro  PNF ext pattern with guiding          With   [] TE   [] TA   [] neuro   [] other: Patient Education: [x] Review HEP    [] Progressed/Changed HEP based on:   [] positioning   [] body mechanics   [] transfers   [] heat/ice application   [] Splint wear/care   [] Sensory re-education   [] scar management      [] other:            Other Objective/Functional Measures:     Max difficulty with all cone activities      Pain Level (0-10 scale) post treatment: 0/10    ASSESSMENT/Changes in Function: unable to elicit volitional wrist movemebt    Patient will continue to benefit from skilled OT services to modify and progress therapeutic interventions, address ROM deficits, address strength deficits and instruct in home and community integration to attain remaining goals. []  See Plan of Care  []  See progress note/recertification  []  See Discharge Summary         Progress towards goals / Updated goals:  1.  Patient will be independent and compliant with updated HEP to retain gains made and progress functional recovery.    2.  Patient will be referred to CVA support group for additional information on driving evaluation         PLAN  []  Upgrade activities as tolerated     [x]  Continue plan of care  []  Update interventions per flow sheet       []  Discharge due to:_  []  Other:_      JUSTIN Cornejo/L 2/3/2020  8:13 AM    Future Appointments   Date Time Provider Marc Peg   2/3/2020  8:45 AM Fedoraelke Fields SKMLVIS [facility]   2/3/2020 10:00 AM Jeremy Hof, PTA MMCPTPB [facility]   2/5/2020  8:00 AM Gulshan Fields ZVNCQIC [facility]   2/5/2020  9:00 AM Jeremy Hof, PTA MMCPTPB [facility]   2/10/2020  8:30 AM Millie Sloan MD [address]   2/10/2020 11:00 AM Gulshan Fields KKUGHBY [facility]   2/10/2020 12:00 PM Jeremy Hof, PTA MMCPTPB [facility]   2/11/2020 10:00 AM Jeremy Hof, PTA MMCPTPB [facility]   2/11/2020 10:45 AM MALLIKA Portillo/L MMCPTPB [facility]   2/14/2020 10:15 AM Anya Guthrie MD [facility]   2/17/2020  8:00 AM Gulshan Fields TIPVDLT [facility]   2/17/2020  9:00 AM Jeremy Hof, PTA MMCPTPB [facility]   2/19/2020  8:00 AM Gulshan Fields UMLNIXL [facility]   2/19/2020  9:00 AM Mert Peoples, PT MMCPTPB [facility]   2/24/2020  8:45 AM Gulshan MESSINA [facility]   2/24/2020  9:30 AM Jeremy Hof, PTA MMCPTPB [facility]   2/26/2020  8:00 AM Gulshan BLAKE [facility]   2/26/2020  9:00 AM Jeremy Hof, PTA MMCPTPB [facility]

## 2020-02-03 NOTE — PROGRESS NOTES
PT DAILY TREATMENT NOTE 10-18    Patient Name: Jillian Khan  Date:2/3/2020  : 1955  [x]  Patient  Verified  Payor: VA MEDICARE / Plan: VA MEDICARE PART A & B / Product Type: Medicare /    In time:1000  Out time:1038  Total Treatment Time (min): 38  Visit #: 2 of 8    Medicare/BCBS Only   Total Timed Codes (min):  38 1:1 Treatment Time:  38       Treatment Area: Lower extremity weakness [R29.898]  Cerebral infarction, unspecified [I63.9]    SUBJECTIVE  Pain Level (0-10 scale): 0/10  Any medication changes, allergies to medications, adverse drug reactions, diagnosis change, or new procedure performed?: [x] No    [] Yes (see summary sheet for update)  Subjective functional status/changes:   [] No changes reported  Pt reminded that PT is only until the end of February, then DC. OBJECTIVE      23 min Therapeutic Exercise:  [] See flow sheet :   Rationale: increase ROM and increase strength to improve the patients ability to ease with ADL's    15 min Therapeutic Activity:  []  See flow sheet :   Rationale: increase ROM, increase strength, improve balance, increase proprioception and ambulation  to improve the patients ability to return to community ambulation. With   [] TE   [] TA   [] neuro   [] other: Patient Education: [x] Review HEP    [] Progressed/Changed HEP based on:   [] positioning   [] body mechanics   [] transfers   [] heat/ice application    [] other:      Other Objective/Functional Measures: Ambulated outdoors with [facility] with CGA on grass and curbs. Educated on ankle TB x 4. Educated pt's wife on assisting with Stabilizing his right leg to decrease compensation   AROM DF=-7 from neutral.  Worked on increasing Gastroc/calf flexibility on the right. Pain Level (0-10 scale) post treatment: 0/10    ASSESSMENT/Changes in Function: Progressed well today ambulating out doors on curbs ,grass and pavement w/o LOB. Pt will continue to work 2x/day on ankle ex's.  He was previously  told that \"he may not have to wear an AFO'     Patient will continue to benefit from skilled PT services to modify and progress therapeutic interventions, address functional mobility deficits, address ROM deficits, address strength deficits, analyze and address soft tissue restrictions, analyze and cue movement patterns, analyze and modify body mechanics/ergonomics, assess and modify postural abnormalities and address imbalance/dizziness to attain remaining goals. [x]  See Plan of Care  []  See progress note/recertification  []  See Discharge Summary         Progress towards goals / Updated goals:  1. Pt will ambulate 4 steps x 3 with 1 HR with supervision to be able to navigate stairs at home.   2. Pt will ambulate >200 ' with [facility] and no evidence of instability in order to improve ease of household negotiation with LRAD. 3. Pt will ambulate outdoors on grass, curbs, with SBA/Supervision w/o LOB to be DC'd to negotiating household and short community distances safely at D/C. Progressing well.  2/3/20    PLAN  [x]  Upgrade activities as tolerated     [x]  Continue plan of care  []  Update interventions per flow sheet       []  Discharge due to:_  []  Other:_      Dari Zambrano, PT 2/3/2020  12:38 PM    Future Appointments   Date Time Provider Marc Peg   2/5/2020  8:00 AM Nanette Whitten AWPFWOJ [facility]   2/5/2020  9:00 AM Diamante Noriega PTA MMCPTPB [facility]   2/10/2020  8:30 AM Jeri Hudson MD [address]   2/10/2020 11:00 AM Nanette MAGAÑAOHERNESTO [facility]   2/10/2020 12:00 PM Diamante Noriega PTA MMCPTPB [facility]   2/11/2020 10:00 AM Diamante Noriega PTA MMCPTPB [facility]   2/11/2020 10:45 AM Christiano Ibarra OTR/L MMCPTPB [facility]   2/14/2020 10:15 AM Dino George MD [facility]   2/17/2020  8:00 AM Nanetteher Whitten [id number] [facility]   2/17/2020  9:00 AM Diamante Noriega PTA MMCPTPB [facility]   2/19/2020  8:00 AM Nanette Whitten GRXQZEP [facility]   2/19/2020  9:00 AM Paxton Cantu PT MMCPTPB [facility]   2/24/2020  8:45 AM Gerardo Mojica Efraín Hunter [facility]   2/24/2020  9:30 AM Jeremy Hof, PTA MMCPTPB [facility]   2/26/2020  8:00 AM Gulshan Fields RRWZQXJ [facility]   2/26/2020  9:00 AM Jeremy Hof, PTA MMCPTPB [facility]

## 2020-02-05 ENCOUNTER — HOSPITAL ENCOUNTER (OUTPATIENT)
Dept: PHYSICAL THERAPY | Age: 65
Discharge: HOME | End: 2020-02-05
Payer: MEDICARE

## 2020-02-05 PROCEDURE — 97112 NEUROMUSCULAR REEDUCATION: CPT

## 2020-02-05 PROCEDURE — 97110 THERAPEUTIC EXERCISES: CPT

## 2020-02-05 PROCEDURE — 97535 SELF CARE MNGMENT TRAINING: CPT

## 2020-02-05 PROCEDURE — 97530 THERAPEUTIC ACTIVITIES: CPT

## 2020-02-05 NOTE — PROGRESS NOTES
OT DAILY TREATMENT NOTE 10-18    Patient Name: Reynaldo Byrd  Date:2020  : 1955  [x]  Patient  Verified  Payor: VA MEDICARE / Plan: VA MEDICARE PART A & B / Product Type: Medicare /    In time:1015  Out time:1053  Total Treatment Time (min): 38  Visit #: 3 of 4    Medicare/BCBS Only   Total Timed Codes (min):  38 1:1 Treatment Time:  38     Treatment Area: Upper extremity weakness [R29.898]  Cerebral infarction, unspecified [I63.9]    SUBJECTIVE  Pain Level (0-10 scale): 0/10  Any medication changes, allergies to medications, adverse drug reactions, diagnosis change, or new procedure performed?: [x] No    [] Yes (see summary sheet for update)  Subjective functional status/changes:   [] No changes reported  I don't like these. I don't want to have to losen them.     OBJECTIVE    9 min Therapeutic Exercise:  [] See flow sheet :   Rationale: increase ROM and increase strength to improve the patients ability to reach    Shoulder abduction  Shoulder Flexion  PROM Wrist    15 min Neuromuscular Re-education:  []  See flow sheet :   Rationale: increase ROM, increase strength and improve coordination  to improve the patients ability to freely move Right UE    Seated Recip Shoulder flex/ext, elbow flex/ext, sup/pro  PNF ext pattern with guiding  Sidelying elbow flexion/extension  Recip Shoulder flexion  Recip Sup/Pro    14 min Self Care/Home Management: Adaptive Laces   Rationale: increase ROM, increase strength and improve coordination  to improve the patients ability to don/doff shoes, improve safety with functional ambulation     Pt education on Spyrolaces vs elastic laces vs velcro shoes  Education on how to use spyrolaces  Pt performed functional transfer with spyrolaces    With   [] TE   [] TA   [] neuro   [] other: Patient Education: [x] Review HEP    [] Progressed/Changed HEP based on:   [] positioning   [] body mechanics   [] transfers   [] heat/ice application   [] Splint wear/care   [] Sensory re-education   [] scar management      [] other:            Other Objective/Functional Measures:     Disliked spyrolaces  Unable to elicit response with supination      Pain Level (0-10 scale) post treatment: 0/10    ASSESSMENT/Changes in Function: Plan for DC next visit, patient aware     Patient will continue to benefit from skilled OT services to modify and progress therapeutic interventions, address ROM deficits, address strength deficits and instruct in home and community integration to attain remaining goals. []  See Plan of Care  []  See progress note/recertification  []  See Discharge Summary         Progress towards goals / Updated goals:  1.  Patient will be independent and compliant with updated HEP to retain gains made and progress functional recovery.  Progressing 2/5/20  2.  Patient will be referred to CVA support group for additional information on driving evaluation      PLAN  []  Upgrade activities as tolerated     [x]  Continue plan of care  []  Update interventions per flow sheet       []  Discharge due to:_  []  Other:_      JUSTIN Bradley/L 2/5/2020  10:51 AM    Future Appointments   Date Time Provider Marc Mirelesi   2/10/2020  8:30 AM Coy Arcos MD [address]   2/10/2020 11:00 AM Elkay Oscar TYAIVZA [facility]   2/10/2020 12:00 PM Mami Screen, PTA MMCPTPB [facility]   2/11/2020 10:00 AM Mami Screen, PTA MMCPTPB [facility]   2/11/2020 10:45 AM MALLIKA Fall/L MMCPTPB [facility]   2/14/2020 10:15 AM Donna Rowell MD [facility]   2/17/2020  8:00 AM Linette Oscar KZZLSXP [facility]   2/17/2020  9:00 AM Mami Screen, PTA MMCPTPB [facility]   2/19/2020  8:00 AM Linette Oscar VYHASTU [facility]   2/19/2020  9:00 AM Bartolo Prasad, PT MMCPTPB [facility]   2/24/2020  8:45 AM Linette Oscar FYCUMEJ [facility]   2/24/2020  9:30 AM Mami Screen, PTA MMCPTPB [facility]   2/26/2020  8:00 AM Linette Oscar AVPIWQL [facility]   2/26/2020  9:00 AM Mami Screen, PTA MMCPTPB [facility]

## 2020-02-05 NOTE — PROGRESS NOTES
PT DAILY TREATMENT NOTE 10-18    Patient Name: Kevin Cole  Date:2020  : 1955  [x]  Patient  Verified  Payor: VA MEDICARE / Plan: VA MEDICARE PART A & B / Product Type: Medicare /    In time:9:29  Out time:10:05  Total Treatment Time (min): 36  Visit #: 3 of 8    Medicare/BCBS Only   Total Timed Codes (min):  36 1:1 Treatment Time:  34       Treatment Area: Lower extremity weakness [R29.898]  Cerebral infarction, unspecified [I63.9]    SUBJECTIVE  Pain Level (0-10 scale): 0/10  Any medication changes, allergies to medications, adverse drug reactions, diagnosis change, or new procedure performed?: [x] No    [] Yes (see summary sheet for update)  Subjective functional status/changes:   [] No changes reported  Pt states wanting to work on whatever he can't do at home. He likes the [facility] better than the [facility] because he can focus on his walk more. He has a hard time picking up his foot when he walks. OBJECTIVE      21 min Therapeutic Exercise:  [] See flow sheet :   Rationale: increase ROM and increase strength to improve the patients ability to ease with ADL's    15 min Therapeutic Activity:  []  See flow sheet :   Rationale: increase ROM, increase strength, improve balance, increase proprioception and ambulation  to improve the patients ability to return to community ambulation.            With   [] TE   [] TA   [] neuro   [] other: Patient Education: [x] Review HEP    [] Progressed/Changed HEP based on:   [] positioning   [] body mechanics   [] transfers   [] heat/ice application    [] other:      Other Objective/Functional Measures:  Reviewed calf stretching prior to walking and strengthening DF to work through full range  Educated on increased repetitions for neuroplasticity and endurance  No LOB with outdoor ambulation on concrete, curbs, grass, ramp or gravel using SBQC  Increased knee hyperextension with fatigue at midstance upon walk back to clinic    Pain Level (0-10 scale) post treatment: 0/10    ASSESSMENT/Changes in Function: Pt progressing with outdoor ambulation using SBQC and decreased assistance. He struggles the most with decreased DF strength and mobility. Will work on improved independence with calf stretching and endurance conditioning to improve ease of going back to a part time work and decrease fall risk with household and community ambulation. Progress towards goals / Updated goals:  1. Pt will ambulate 4 steps x 3 with 1 HR with supervision to be able to navigate stairs at home.   2. Pt will ambulate >200 ' with [facility] and no evidence of instability in order to improve ease of household negotiation with LRAD. 3. Pt will ambulate outdoors on grass, curbs, with SBA/Supervision w/o LOB to be DC'd to negotiating household and short community distances safely at D/C. Progressing well.  2/3/20    PLAN  [x]  Upgrade activities as tolerated     [x]  Continue plan of care  []  Update interventions per flow sheet       []  Discharge due to:_  []  Other:_      Johnny Vasquez PTA 2/5/2020  12:38 PM    Future Appointments   Date Time Provider Marc Mirelesi   2/10/2020  8:30 AM Nick Branch MD [address]   2/10/2020 11:00 AM David Florentino EQFGMLT [facility]   2/10/2020 12:00 PM Lorenzo Gravel, PTA MMCPTPB [facility]   2/11/2020 10:00 AM Lorenzo Gravel, PTA MMCPTPB [facility]   2/11/2020 10:45 AM MALLIKA Omalley/L MMCPTPB [facility]   2/14/2020 10:15 AM Teofilo Pulliam MD [facility] [address]   2/17/2020  8:00 AM David Florentino [id number] [facility]   2/17/2020  9:00 AM Lorenzo Gravel, PTA MMCPTPB [facility]   2/19/2020  8:00 AM David Florentino ETABTBR [facility]   2/19/2020  9:00 AM Leola Severino, PT MMCPTPB [facility]   2/24/2020  8:45 AM David Florentino WSOUZCG [facility]   2/24/2020  9:30 AM Lorenzo Gravel, PTA MMCPTPB [facility]   2/26/2020  8:00 AM David Florentino IIRLWWQ [facility]   2/26/2020  9:00 AM Lorenzo Gravel, PTA MMCPTPB [facility]

## 2020-02-07 ENCOUNTER — TELEPHONE (OUTPATIENT)
Dept: NEUROLOGY | Age: 65
End: 2020-02-07

## 2020-02-07 ENCOUNTER — APPOINTMENT (OUTPATIENT)
Dept: PHYSICAL THERAPY | Age: 65
End: 2020-02-07
Payer: MEDICARE

## 2020-02-10 ENCOUNTER — OFFICE VISIT (OUTPATIENT)
Dept: NEUROLOGY | Age: 65
End: 2020-02-10

## 2020-02-10 ENCOUNTER — HOSPITAL ENCOUNTER (OUTPATIENT)
Dept: PHYSICAL THERAPY | Age: 65
Discharge: HOME | End: 2020-02-10
Payer: MEDICARE

## 2020-02-10 DIAGNOSIS — G47.33 OSA (OBSTRUCTIVE SLEEP APNEA): Primary | ICD-10-CM

## 2020-02-10 DIAGNOSIS — I63.512 ACUTE ISCHEMIC LEFT MCA STROKE (HCC): ICD-10-CM

## 2020-02-10 DIAGNOSIS — I10 ESSENTIAL HYPERTENSION: ICD-10-CM

## 2020-02-10 PROCEDURE — 97530 THERAPEUTIC ACTIVITIES: CPT

## 2020-02-10 PROCEDURE — 97110 THERAPEUTIC EXERCISES: CPT

## 2020-02-10 RX ORDER — CARVEDILOL 12.5 MG/1
12.5 TABLET ORAL
COMMUNITY
Start: 2019-10-21

## 2020-02-10 RX ORDER — LOSARTAN POTASSIUM 100 MG/1
TABLET ORAL
COMMUNITY
Start: 2019-12-04 | End: 2020-07-23

## 2020-02-10 RX ORDER — BACLOFEN 10 MG/1
10 TABLET ORAL
COMMUNITY
End: 2021-05-28

## 2020-02-10 RX ORDER — SERTRALINE HYDROCHLORIDE 50 MG/1
TABLET, FILM COATED ORAL
COMMUNITY
Start: 2020-01-09

## 2020-02-10 NOTE — PROGRESS NOTES
Chief Complaint   Patient presents with    Sleep Problem     follow up STEFANIE       [facility] presents today for   Chief Complaint   Patient presents with    Sleep Problem     follow up STEFANIE       Is someone accompanying this pt? Yes, wife    Is the patient using any DME equipment during [address]? [facility]    Depression Screening:  3 most recent PHQ Screens 2/10/2020   Little interest or pleasure in doing things Not at all   Feeling down, depressed, irritable, or hopeless Not at all   Total Score PHQ 2 0       Learning Assessment:  No flowsheet data found. Abuse Screening:  No flowsheet data found. Fall Risk  Fall Risk Assessment, last 12 mths 2/10/2020   Able to walk? Yes   Fall in past 12 months? No         Coordination of Care:  1. Have you been to the ER, urgent care clinic since your last visit? Hospitalized since your last visit? no    2. Have you seen or consulted any other health care providers outside of the [address] [place] [address] since your last visit? Include any pap smears or colon screening. Yes, SHER Byrd.

## 2020-02-10 NOTE — PATIENT INSTRUCTIONS
Thank you for choosing [employer] and [facility] for your  
 
care. You may receive a survey about your visit. We appreciate you taking time  
 
to complete this survey as we use your feedback to improve our services. We  
 
realize we are not perfect, but we strive to provide excellent care.

## 2020-02-10 NOTE — PROGRESS NOTES
[facility] [place] [employer] [facility]  [address] [place] [address]  [phone] fax    Occupational Therapy Discharge Summary  Patient name: Cristiane Gresham Start of Care: 10/22/19   Referral source: Daniel Washington NP : 1955   Medical/Treatment Diagnosis: Upper extremity weakness [R29.898]  Cerebral infarction, unspecified [I63.9]  Payor: VA MEDICARE / Plan: VA MEDICARE PART A & B / Product Type: Medicare /  Onset Date:19     Prior Hospitalization: see medical history Provider#: [id number]   Medications: Verified on Patient Summary List    Comorbidities: *Depression, HTN, DM, arthritis  Prior Level of Function:REtired from [employer], [employer] yard care driving, part time parts delivery, carpentry work    Visits from Start of Care: 44    Missed Visits: 0    Reporting Period : 20 to 2/10/20    Summary of Care:Patient seen for modalities, neuromuscular re-education, ADL retraining, therapeutic exercises and activities to improve right UE function. He has HEP and NMES unit. Current measures are listed below with prior in ( )  Shoulder Flexion:        58        (55)      (30)  Shoulder Abduction:              30         (25)     (15)        Neuro  Quality of Life UE limitation             51%     (82%)     (58%)    1.  Patient will be independent and compliant with updated HEP to retain gains made and progress functional recovery. Status alst note: Needed update  Discharge status;Met 2/10/20    2.  Patient will be referred to CVA support group for additional information on driving evaluation    Status at last note: Needed cueing  Discharge status: Met 2/10/20    ASSESSMENT/Changes in Function: Patient demonstrated excellent progress in ADL independence and has had some recovery of function in right UE. He continues to lack distal active motion despite NMES and neuro re-education in clinic.   At this time, he is being discharged with home program to see if recovery will progress with additional work with NMES.       ASSESSMENT/RECOMMENDATIONS:  [x]Discontinue therapy: [x]Patient has reached or is progressing toward set goals      []Patient is non-compliant or has abdicated      []Due to lack of appreciable progress towards set goals    MALLIKA Baker/L 2/10/2020 3:44 PM

## 2020-02-10 NOTE — PROGRESS NOTES
OT DAILY TREATMENT NOTE 10-18    Patient Name: Yordan Parker  Date:2/10/2020  : 1955  [x]  Patient  Verified  Payor: VA MEDICARE / Plan: VA MEDICARE PART A & B / Product Type: Medicare /    In time:1101  Out time:1140  Total Treatment Time (min): 39  Visit #: 4 of 4    Medicare/BCBS Only   Total Timed Codes (min):  39 1:1 Treatment Time:  39     Treatment Area: Upper extremity weakness [R29.898]  Cerebral infarction, unspecified [I63.9]    SUBJECTIVE  Pain Level (0-10 scale): 0/10  Any medication changes, allergies to medications, adverse drug reactions, diagnosis change, or new procedure performed?: [x] No    [] Yes (see summary sheet for update)  Subjective functional status/changes:   [] No changes reported  I am going back to work at the end of the month. That is the goal I set.      OBJECTIVE  25 min Therapeutic Exercise:  [] See flow sheet :   Rationale: increase ROM and increase strength to improve the patients ability to reach, functionally use Right UE    Recheck: ROM, Goals    Review DC HEP    14 min Therapeutic Activity:  []  See flow sheet :   Rationale: increase ROM and improve coordination  to improve the patients ability to functionally use Right UE    Recheck Goals  Education provided on Stroke Support Group  Education provided on velcro shoe fasteners   Review NMES use       With   [] TE   [] TA   [] neuro   [] other: Patient Education: [x] Review HEP    [] Progressed/Changed HEP based on:   [] positioning   [] body mechanics   [] transfers   [] heat/ice application   [] Splint wear/care   [] Sensory re-education   [] scar management      [] other:            Other Objective/Functional Measures:     Shoulder Flexion:  58 (55) (30)  Shoulder Abduction: 30  (25) (15)   Neuro  Quality of Life UE limitation  51% (82%)     (58%)    Pain Level (0-10 scale) post treatment: 0/10    ASSESSMENT/Changes in Function: Some improvement with ROM, ongoing concerns over hand function        []  See Plan of Care  []  See progress note/recertification  [x]  See Discharge Summary         Progress towards goals / Updated goals:  1.  Patient will be independent and compliant with updated HEP to retain gains made and progress functional recovery.  Met 2/10/20  2.  Patient will be referred to CVA support group for additional information on driving evaluation  Met 2/10/20       PLAN  []  Upgrade activities as tolerated     []  Continue plan of care  []  Update interventions per flow sheet       [x]  Discharge due to:_ plateau ing     []  Other:_      C[place] LewisANDERSON/L 2/10/2020  9:44 AM    Future Appointments   Date Time Provider Marc Peg   2/11/2020 10:00 AM Donna Patient, PTA MMCPTPB [facility]   2/11/2020 10:45 AM MALLIKA Samuel/ROVERTO MMCPTPB [facility]   2/14/2020 10:15 AM Christina Bello MD [facility]   2/17/2020  8:00 AM Fredi Holland [id number] [facility]   2/17/2020  9:00 AM Donna Patient, PTA MMCPTPB [facility]   2/19/2020  8:00 AM Fredi Holland DDZOITW [facility]   2/19/2020  9:00 AM Randal Quinonez, PT LOOWYOB [facility]   2/24/2020  8:45 AM Fredi Holland IORUSMS [facility]   2/24/2020  9:30 AM Donna Patient, PTA MMCPTPB [facility]   2/26/2020  8:00 AM Fredi Holland SGCITTL [facility]   2/26/2020  9:00 AM Donna Patient, PTA MMCPTPB [facility]   5/11/2020  8:45 AM Mauro Martinez MD [address] [place] [address]

## 2020-02-10 NOTE — PROGRESS NOTES
[facility]  [phone] fax      Patient Name: Myranda Harper  : 1955      Occupational Therapy Discharge Instructions        Continue with home exercise program for 2-3 times a day for 4 weeks then decrease to 1-3 times a day as needed/patient discretion. Continue with    [x] NMES        - 1x a day for 15 min - shoulder       - 30 min- Hand- Wrist Extensors              Follow up with MD:     [] Upon completion of therapy     [x] As needed      Recommendations:    []  Return to activity with home program                    []  Return to activity with the following modifications :                              []  Practice Hand coordination activities                                      []  Wear Splint as prescribed:                    [] Return to/Join local gym        Additional comments:   - Continue BUE stretching    - Small upward movements when performing shoulder abduction (\"Ch[employer]\") are okay, watch for large thrusting movement     - Continue attempting to use Right hand for grasping    - Continue Supination/Pronation stretches    - Stroke Support Group!!     -- Return to Driving Information        Please call with any questions or concerns. Thank you for your participation.          DARELL Olea  2/10/2020  11:26 AM

## 2020-02-10 NOTE — PROGRESS NOTES
PT DAILY TREATMENT NOTE 10-18    Patient Name: Maxi Payer  Date:2/10/2020  : 1955  [x]  Patient  Verified  Payor: VA MEDICARE / Plan: VA MEDICARE PART A & B / Product Type: Medicare /    In time:12:00  Out time:12:48  Total Treatment Time (min): 48  Visit #: 4 of 8    Medicare/BCBS Only   Total Timed Codes (min):  48 1:1 Treatment Time:  31       Treatment Area: Lower extremity weakness [R29.898]  Cerebral infarction, unspecified [I63.9]    SUBJECTIVE  Pain Level (0-10 scale): 0/10  Any medication changes, allergies to medications, adverse drug reactions, diagnosis change, or new procedure performed?: [x] No    [] Yes (see summary sheet for update)  Subjective functional status/changes:   [] No changes reported   Pt reports he would like to work on strengthening his legs more today. He states he was D/C from OT today. OBJECTIVE      48 min Therapeutic Exercise:  [] See flow sheet :   Rationale: increase ROM and increase strength to improve the patients ability to ease with ADL's    With   [] TE   [] TA   [] neuro   [] other: Patient Education: [x] Review HEP    [] Progressed/Changed HEP based on:   [] positioning   [] body mechanics   [] transfers   [] heat/ice application    [] other:      Other Objective/Functional Measures:  BP: 176/83  HR: 50 bpm  Decreased adductor strength; added ball squeeze for most exercises for better engagement  Challenged with HS strengthening  Instability noted trying to perform S/L bridges  Educated on being given updated HEP next session    Pain Level (0-10 scale) post treatment: 0/10    ASSESSMENT/Changes in Function: Pt progressing towards final goals. He has weakness of the adductor group contributing to instability with ambulation. Will work on updated a HEP for progression towards D/C by the end of the month.      Progress towards goals / Updated goals:  1. Pt will ambulate 4 steps x 3 with 1 HR with supervision to be able to navigate stairs at home.   2. Pt will ambulate >200 ' with [address] and no evidence of instability in order to improve ease of household negotiation with LRAD. 3. Pt will ambulate outdoors on grass, curbs, with SBA/Supervision w/o LOB to be DC'd to negotiating household and short community distances safely at D/C. Progressing well.  2/3/20    PLAN  [x]  Upgrade activities as tolerated     [x]  Continue plan of care  []  Update interventions per flow sheet       []  Discharge due to:_  []  Other:_      Zoltan Diego PTA 2/10/2020  12:38 PM    Future Appointments   Date Time Provider Marc Ruvalcaba   2/10/2020 11:00 AM Maryjane Orellana GLNTOSL [address]   2/10/2020 12:00 PM Berna Olson PTA MMCPTPB [address]   2/11/2020 10:00 AM Berna Olson PTA MMCPTPB [address]   2/11/2020 10:45 AM Agus Goodman OTR/ROVERTO MMCPTPB [address]   2/14/2020 10:15 AM Madison Sahni MD [facility]   2/17/2020  8:00 AM Maryjane Orellana SCYJOAJ [address]   2/17/2020  9:00 AM Berna Olson PTA MMCPTPB [address]   2/19/2020  8:00 AM Maryjane Orellana XHWUTIX [address]   2/19/2020  9:00 AM Edda Salvador, PT MMCPTPB [address]   2/24/2020  8:45 AM Maryjane Orellana PHTOPVQ [address]   2/24/2020  9:30 AM Berna Olson PTA MMCPTPB [address]   2/26/2020  8:00 AM Maryjane Orellana FZLRZZJ [address]   2/26/2020  9:00 AM Berna Olson PTA MMCPTPB [address]   5/11/2020  8:45 AM Cuebas, Morey Schilder, MD [address]

## 2020-02-10 NOTE — PROGRESS NOTES
2/10/2020 9:25 AM    SSN: [id number]    Subjective:   72 y/o male coming for f/u of STEFANIE. His last visit with me was in November. He comes for follow-up of a history of a left posterior basal ganglia and corona radiata stroke in August 2019 leaving him with substantial right-sided hemiparesis. At the 6-month yvette he is still going through physical therapy but has not really recovered any strength or dexterity of the right hand in particular and he asks about what else expect. His blood pressure has remained uncontrolled. Today systolic is 151. He has renal insufficiency. He sees a nephrologist.  Last year he did have a renal ultrasound that did not show any evidence of renal artery stenosis. I am not aware when that he has been checked for pheochromocytoma. At any rate, he is on 6 blood pressure medications at this time. At the request of his insurance we obtain a home sleep study to reestablish his diagnosis  so he could get his ASV machine, a process that was protracted. Home sleep study on November 14 showed an AHI of 53 with desaturation down to a minimum of 70%. He did finally receive his ASV machine, and currently he is using it with a max IPAP of 25 minimum EPAP of 4 with pressure support of 4, with 8 out of 8 days used an average use of 9 hours and 49 minutes through February to 6. He just received his machine about a week and a half. He is not snoring. He feels rested. He feels comfortable with his machine. He is now getting his supplies. Last hemoglobin A1c was 6.6 last August.  He has been diagnosed with prediabetes and reportedly is monitoring his glucose and it stays controlled. He is on atorvastatin at 80 mg nightly. He is not a smoker.       Social History     Socioeconomic History    Marital status: UNKNOWN     Spouse name: Not on file    Number of children: Not on file    Years of education: Not on file    Highest education level: Not on file Occupational History    Not on file   Social Needs    Financial resource strain: Not on file    Food insecurity:     Worry: Not on file     Inability: Not on file    Transportation needs:     Medical: Not on file     Non-medical: Not on file   Tobacco Use    Smoking status: Former Smoker    Smokeless tobacco: Never Used   Substance and Sexual Activity    Alcohol use: No     Alcohol/week: 0.0 standard drinks    Drug use: No    Sexual activity: Not on file   Lifestyle    Physical activity:     Days per week: Not on file     Minutes per session: Not on file    Stress: Not on file   Relationships    Social connections:     Talks on phone: Not on file     Gets together: Not on file     Attends [employer] service: Not on file     Active member of club or organization: Not on file     Attends meetings of clubs or organizations: Not on file     Relationship status: Not on file    Intimate partner violence:     Fear of current or ex partner: Not on file     Emotionally abused: Not on file     Physically abused: Not on file     Forced sexual activity: Not on file   Other Topics Concern    Not on file   Social History Narrative    Not on file       Family History   Problem Relation Age of Onset    Cancer Mother         Gastric cancer    Heart Disease Father     Prostate Cancer Father        Current Outpatient Medications   Medication Sig Dispense Refill    baclofen (LIORESAL) 10 mg tablet Take 10 mg by mouth.  sertraline (ZOLOFT) 50 mg tablet       losartan (COZAAR) 100 mg tablet       carvediloL (COREG) 12.5 mg tablet Take 12.5 mg by mouth.  ascorbic acid, vitamin C, (VITAMIN C) 250 mg tablet Take 1 Tab by mouth daily (with breakfast). 30 Tab 0    hydrALAZINE (APRESOLINE) 25 mg tablet Take 3 Tabs by mouth every eight (8) hours. Indications: high blood pressure 270 Tab 0    aspirin 81 mg chewable tablet Take 1 Tab by mouth daily (with breakfast).  Indications: stroke prevention 30 Tab 0    atorvastatin (LIPITOR) 80 mg tablet Take 1 Tab by mouth nightly. Indications: excessive fat in the blood, stroke prevention 30 Tab 0    cholecalciferol (VITAMIN D3) 5,000 unit capsule Take 1 Cap by mouth daily. Indications: vitamin D deficiency 30 Cap 0    cyanocobalamin 1,000 mcg tablet Take 1 Tab by mouth daily. Indications: Vitamin B12 deficiency 30 Tab 0    amLODIPine (NORVASC) 10 mg tablet Take 10 mg by mouth daily.  ferrous sulfate 325 mg (65 mg iron) tablet Take 1 Tab by mouth daily (with breakfast). 30 Tab 0    isosorbide dinitrate (ISORDIL) 30 mg tablet Take 1 Tab by mouth every eight (8) hours. Indications: Hypertension 90 Tab 0    labetalol (NORMODYNE) 300 mg tablet Take 2 Tabs by mouth every twelve (12) hours. Indications: high blood pressure 120 Tab 0    spironolactone (ALDACTONE) 50 mg tablet Take 1 Tab by mouth two (2) times a day.  Indications: aldosteronism, high blood pressure, prevention of low potassium in the blood 60 Tab 0       Past Medical History:   Diagnosis Date    Acute ischemic stroke (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     On Allopurinol    Chronic hypokalemia     Persistent chronic hypokalemia + hypertension; ?primary hyperaldosteronism    CKD (chronic kidney disease) stage 2, GFR 60-89 ml/min 8/15/2019    Current use of aspirin 8/14/2019    Dysarthria 8/12/2019    Dysphagia 8/12/2019    Elevated prostate specific antigen (PSA) 7/21/2011    First degree atrioventricular block by electrocardiogram 8/12/2019    Hypertensive heart and kidney disease without heart failure and with stage 2 chronic kidney disease     Iron deficiency anemia 8/14/2019    Anemia work-up (8/14/2019) showed serum iron 22, TIBC 371, iron % saturation 6, ferritin 34, reticulocyte count 1.4     Obesity, Class I, BMI 30-34.9     Obstructive sleep apnea on CPAP     On statin therapy due to risk of future cardiovascular event 8/14/2019    On Atorvastatin    Primary osteoarthritis of right ankle 8/22/2019    X-ray of the right ankle (8/22/2019) showed no acute fracture or subluxation; advanced degenerative changes at the tibiotalar joint; old fracture fragment vs. accessory ossicle in the inferior aspect of the lateral malleolus; soft tissue swelling around the ankle.  Pure hypercholesterolemia 08/15/2019    Lipid profile (8/13/2019) showed , , HDL 42, ; Lipid profile (8/14/2019) showed , , HDL 39, LDL 94    Received intravenous tissue plasminogen activator (tPA) in emergency department 8/12/2019    Refusal of statin medication by patient 8/13/2019    As per note of Neurology (Dr. Ara Guerrero), patient refuses statin agent because of potential side effects.  Right hemiparesis ([facility] [address] 8/12/2019    Secondary hyperparathyroidism of renal origin ([facility] [address] 8/18/2019    PTH (8/18/2019) = 101.7    Type 2 diabetes mellitus with stage 2 chronic kidney disease (HCC)     HbA1c (8/13/2019) = 6.6    Vitamin B12 deficiency anemia 8/14/2019    Vitamin B12 (8/14/2019) = 208    Vitamin D deficiency 8/19/2019    Vitamin D 25-Hydroxy (8/19/2019) = 16.2        Past Surgical History:   Procedure Laterality Date    COLONOSCOPY N/A 4/15/2019    COLONOSCOPY performed by Harsh Salazar MD at [facility] ENDOSCOPY    HX TONSILLECTOMY         No Known Allergies    Vital signs: There were no vitals taken for this visit. Review of Systems:   GENERAL: Denies fever or fatigue  CARDIAC: No CP or SOB  PULMONARY: No cough of SOB  MUSCULOSKELETAL: No new joint pain  NEURO: SEE HPI      EXAM: Alert, in NAD. Heart is regular.  Oriented x3, EOM's are full, PERRL, right lower facial droop with slight dysarthria flaccid right upper extremity paresthesias, 3/5 right lower extremity, right-sided hemiparetic gait, DTRs +3 on the right. Assessment/Plan: STEFANIE with a central component, with an overall AHI on ASV of 8.4 and a central apnea index of 5.7. This has been only over a day.,  So for now I will observe the trend and keep the settings like they are. I advised him about ongoing compliance. Regarding his stroke, Splane that at the 6-month yvette given that he has not recovered any function of the right hand and that it is likely that this is going to be permanent. He has been adapting with physical therapy and I counseled him about this. His blood pressure continues to be a problem, drug-resistant despite having no renal artery stenosis, sleep apnea has been controlled, and on 6 blood pressure medications. I counseled him about blood pressure control. I would recommend he is checked for a pheochromocytoma by his primary care physician and continue blood pressure control efforts. Also counseled him about the importance of ongoing cholesterol medication use, aspirin use, and blood glucose monitoring. 25/40 mins in counseling of stroke prevention, expectations for further recovery, coping with deficits, sleep apnea, treatment, management of hyperglycemia, intractable hypertension, and hyperlipidemia. PLEASE NOTE:   Portions of this document may have been produced using voice recognition software. Unrecognized errors in transcription may be present. This note will not be viewable in [address]

## 2020-02-11 ENCOUNTER — HOSPITAL ENCOUNTER (OUTPATIENT)
Dept: PHYSICAL THERAPY | Age: 65
Discharge: HOME | End: 2020-02-11
Payer: MEDICARE

## 2020-02-11 PROCEDURE — 97110 THERAPEUTIC EXERCISES: CPT

## 2020-02-11 NOTE — PROGRESS NOTES
PT DAILY TREATMENT NOTE 10-18    Patient Name: Stan Otero  Date:2020  : 1955  [x]  Patient  Verified  Payor: [place] MED[place]ARE / Plan: [place] MEDICARE PART A & B / Product Type: Medicare /    In time: 10:01  Out time:10:45  Total Treatment Time (min): 44  Visit #: 5 of 8    Medicare/BCBS Only   Total Timed Codes (min):  44 1:1 Treatment Time:  30       Treatment Area: Lower extremity weakness [R29.898]  Cerebral infarction, unspecified [I63.9]    SUBJECTIVE  Pain Level (0-10 scale): 0/10  Any medication changes, allergies to medications, adverse drug reactions, diagnosis change, or new procedure performed?: [x] No    [] Yes (see summary sheet for update)  Subjective functional status/changes:   [] No changes reported  Pt reports he was a little sore after his session yesterday. He wants to know when he should walk with the cane and without the AFO on. OBJECTIVE      44 min Therapeutic Exercise:  [] See flow sheet :   Rationale: increase ROM and increase strength to improve the patients ability to ease with ADL's    With   [] TE   [] TA   [] neuro   [] other: Patient Education: [x] Review HEP    [] Progressed/Changed HEP based on:   [] positioning   [] body mechanics   [] transfers   [] heat/ice application    [] other:      Other Objective/Functional Measures:  BP: 177/85  HR: 56 bpm  Provided updated HEP  Educated on short distances without AFO for strengthening but if he notices the foot dragging to put it back on for endurance  Educated on progressing towards [facility] with endurance and balance improving    Pain Level (0-10 scale) post treatment: 0/10    ASSESSMENT/Changes in Function: Pt progressing towards final goals in therapy. He has some adductor, HS and DF weakness contributing to continued altered gait mechanics. He continues to require AFO for prolonged ambulation due to decreased endurance and extensor synergy.  Will progress towards D/C in next 3 visits with good understanding of self progression of gait training and strengthening. Progress towards goals / Updated goals:  1. Pt will ambulate 4 steps x 3 with 1 HR with supervision to be able to navigate stairs at home.   2. Pt will ambulate >200 ' with [facility] and no evidence of instability in order to improve ease of household negotiation with LRAD. 3. Pt will ambulate outdoors on grass, curbs, with SBA/Supervision w/o LOB to be DC'd to negotiating household and short community distances safely at D/C. Progressing well.  2/3/20  MET    PLAN  [x]  Upgrade activities as tolerated     [x]  Continue plan of care  []  Update interventions per flow sheet       []  Discharge due to:_  []  Other:_      Athnehemiah Connors, PTA 2/11/2020  12:38 PM    Future Appointments   Date Time Provider Marc Ruvalcaba   2/11/2020 10:00 AM Da Graft, PTA [facility]   2/14/2020 10:15 AM Asher Hodgson MD [facility]   2/17/2020  9:00 AM Da Graft, PTA [facility]   2/19/2020  9:00 AM Osvaldo Riding, PT [facility]   2/24/2020  9:30 AM Da Graft, PTA [facility]   2/26/2020  9:00 AM Da Graft, PTA [facility]   5/11/2020  8:45 AM Tana Perez MD [address]

## 2020-02-14 ENCOUNTER — APPOINTMENT (OUTPATIENT)
Dept: PHYSICAL THERAPY | Age: 65
End: 2020-02-14
Payer: MEDICARE

## 2020-02-17 ENCOUNTER — HOSPITAL ENCOUNTER (OUTPATIENT)
Dept: PHYSICAL THERAPY | Age: 65
Discharge: HOME | End: 2020-02-17
Payer: MEDICARE

## 2020-02-17 ENCOUNTER — APPOINTMENT (OUTPATIENT)
Dept: PHYSICAL THERAPY | Age: 65
End: 2020-02-17
Payer: MEDICARE

## 2020-02-17 PROCEDURE — 97530 THERAPEUTIC ACTIVITIES: CPT

## 2020-02-17 PROCEDURE — 97112 NEUROMUSCULAR REEDUCATION: CPT

## 2020-02-19 ENCOUNTER — HOSPITAL ENCOUNTER (OUTPATIENT)
Dept: PHYSICAL THERAPY | Age: 65
Discharge: HOME | End: 2020-02-19
Payer: MEDICARE

## 2020-02-19 ENCOUNTER — APPOINTMENT (OUTPATIENT)
Dept: PHYSICAL THERAPY | Age: 65
End: 2020-02-19
Payer: MEDICARE

## 2020-02-19 PROCEDURE — 97110 THERAPEUTIC EXERCISES: CPT

## 2020-02-19 NOTE — PROGRESS NOTES
PT DAILY TREATMENT NOTE 10-18    Patient Name: Sophia Polanco  Date:2020  : 1955  [x]  Patient  Verified  Payor: [place] MEDICARE / Plan: [place] MEDICARE PART A & B / Product Type: Medicare /    In time:930  Out time:1020  Total Treatment Time (min): 50  Visit #: 7 of 8    Medicare/BCBS Only   Total Timed Codes (min):  50 1:1 Treatment Time:  50       Treatment Area: Lower extremity weakness [R29.898]  Cerebral infarction, unspecified [I63.9]    SUBJECTIVE  Pain Level (0-10 scale): 0/10  Any medication changes, allergies to medications, adverse drug reactions, diagnosis change, or new procedure performed?: [x] No    [] Yes (see summary sheet for update)  Subjective functional status/changes:   [] No changes reported  \"I'm trying to wean myself off the foot brace\"  No AFO today. One more session and DC. OBJECTIVE      30 min Therapeutic Exercise:  [] See flow sheet :   Rationale: increase ROM, increase strength, improve coordination and improve balance to improve the patients ability to ease with ADL's    20 min Neuromuscular Re-education:  []  See flow sheet :   Rationale: improve coordination, improve balance and increase proprioception  to improve the patients ability to ease with ADL's      With   [] TE   [] TA   [] neuro   [] other: Patient Education: [x] Review HEP    [] Progressed/Changed HEP based on:   [] positioning   [] body mechanics   [] transfers   [] heat/ice application    [] other:      Other Objective/Functional Measures: Worked on getting of the floor with support from bed. SB ex's on mat table to activate HS isomerically. Unilateral hip abduction with Purple band. Pain Level (0-10 scale) post treatment: 0/10    ASSESSMENT/Changes in Function: Pt progressing well toward DC goals. Pt is able to ambulate with SPC and w/o AFO demonstrating little fatigue in hie ankle during ambulation. Reports he is going to try to drive to MD's appt tomorrow, 40 mins. His wife will be with him. Patient will continue to benefit from skilled PT services to modify and progress therapeutic interventions, address functional mobility deficits, address ROM deficits, address strength deficits, analyze and address soft tissue restrictions, analyze and cue movement patterns, analyze and modify body mechanics/ergonomics, assess and modify postural abnormalities and address imbalance/dizziness to attain remaining goals. [x]  See Plan of Care  []  See progress note/recertification  []  See Discharge Summary         Progress towards goals / Updated goals:  1. Pt will ambulate 4 steps x 3 with 1 HR with supervision to be able to navigate stairs at home. MET  2. Pt will ambulate >200 ' with [facility] and no evidence of instability in order to improve ease of household negotiation with LRAD. Progressing minor toe drag with fatigue and right genu recurvatum at midstance  3. Pt will ambulate outdoors on grass, curbs, with SBA/Supervision w/o LOB to be DC'd to negotiating household and short community distances safely at D/C. Progressing well.  2/3/20  MET    PLAN  [x]  Upgrade activities as tolerated     [x]  Continue plan of care  []  Update interventions per flow sheet       []  Discharge due to:_  []  Other:_      Celeste Godinez, PT 2/19/2020  9:25 AM    Future Appointments   Date Time Provider Marc Ruvalcaba   2/19/2020  9:30 AM Osvaldo Mehtaing, PT [facility]   2/24/2020  9:30 AM Da Graft, PTA [facility]   2/26/2020  9:00 AM Da Graft, PTA [facility]   5/11/2020  8:45 AM Tana Perez MD [address]

## 2020-02-21 ENCOUNTER — APPOINTMENT (OUTPATIENT)
Dept: PHYSICAL THERAPY | Age: 65
End: 2020-02-21
Payer: MEDICARE

## 2020-02-24 ENCOUNTER — APPOINTMENT (OUTPATIENT)
Dept: PHYSICAL THERAPY | Age: 65
End: 2020-02-24
Payer: MEDICARE

## 2020-02-24 ENCOUNTER — HOSPITAL ENCOUNTER (OUTPATIENT)
Dept: PHYSICAL THERAPY | Age: 65
Discharge: HOME | End: 2020-02-24
Payer: MEDICARE

## 2020-02-24 PROCEDURE — 97530 THERAPEUTIC ACTIVITIES: CPT

## 2020-02-24 PROCEDURE — 97110 THERAPEUTIC EXERCISES: CPT

## 2020-02-24 NOTE — PROGRESS NOTES
PT DAILY TREATMENT NOTE 10-18    Patient Name: Aravind Taylor  Date:2020  : 1955  [x]  Patient  Verified  Payor: Linder Cheadle / Plan: [place] MEDICARE PART A & B / Product Type: Medicare /    In time: 9:30 Out time: 10:00  Total Treatment Time (min): 30  Visit #: 8 of 8    Medicare/BCBS Only   Total Timed Codes (min):  30 1:1 Treatment Time:  30       Treatment Area: Lower extremity weakness [R29.898]  Cerebral infarction, unspecified [I63.9]    SUBJECTIVE  Pain Level (0-10 scale): 0/10  Any medication changes, allergies to medications, adverse drug reactions, diagnosis change, or new procedure performed?: [x] No    [] Yes (see summary sheet for update)  Subjective functional status/changes:   [] No changes reported  Pt reports he is going back to work Monday for 6 hour shifts working 3 days a week. He is going to continue with his strengthening and practice more with the cane. He would like to review his HS exercises.      OBJECTIVE    15 min Therapeutic Exercise:  [] See flow sheet :   Rationale: increase ROM, increase strength, improve coordination and improve balance to improve the patients ability to ease with ADL's    15 min Therapeutic Activity:  [x]  See flow sheet : ambulation around clinic with [facility]   Rationale: improve coordination, improve balance and increase proprioception  to improve the patients ability to ease with ADL's      With   [] TE   [] TA   [] neuro   [] other: Patient Education: [x] Review HEP    [] Progressed/Changed HEP based on:   [] positioning   [] body mechanics   [] transfers   [] heat/ice application    [] other:      Other Objective/Functional Measures:   Ambulation with SPC: >200 feet with SPC and SBA with minor instability at end due to fatigue  Reviewed exercises that pt requested from HEP  Educated on wearing AFO with return to work for improved safety until he can better assess fatigue levels  Educated he could return to therapy in the future if he had something functional he needed help focusing on but to try independent strengthening/stretching at this time. Pain Level (0-10 scale) post treatment: 0/10    ASSESSMENT/Changes in Function: Pt progressed well towards final goals in therapy. He was able to negotiate stairs reciprocally with 1 HR with supervision and navigate community distances outdoors without LOB. He is able to ambulate with [facility] but demonstrates minor instability at end of larger distances with toe drag and genu recurvatum at midstance. Pt is ready to D/C at this time to continue independent progression at home with strengthening/stretching for further gains with ambulation and transfers. Progress towards goals / Updated goals:  1. Pt will ambulate 4 steps x 3 with 1 HR with supervision to be able to navigate stairs at home. MET  2. Pt will ambulate >200 ' with [facility] and no evidence of instability in order to improve ease of household negotiation with LRAD. Progressing minor toe drag with fatigue and right genu recurvatum at midstance with fatigue >200 feet  3. Pt will ambulate outdoors on grass, curbs, with SBA/Supervision w/o LOB to be DC'd to negotiating household and short community distances safely at D/C. Progressing well.  2/3/20  MET    PLAN  [x]  Upgrade activities as tolerated     [x]  Continue plan of care  []  Update interventions per flow sheet       []  Discharge due to:_  []  Other:_      Marilu Oscar PTA 2/24/2020  9:25 AM    Future Appointments   Date Time Provider Marc Mirelesi   2/24/2020  9:30 AM Carmelo Fernandez [facility]   5/11/2020  8:45 AM Maciej Perez MD [address]

## 2020-02-25 ENCOUNTER — HOSPITAL ENCOUNTER (EMERGENCY)
Age: 65
Discharge: HOME | End: 2020-02-25
Attending: EMERGENCY MEDICINE
Payer: MEDICARE

## 2020-02-25 VITALS
DIASTOLIC BLOOD PRESSURE: 77 MMHG | OXYGEN SATURATION: 100 % | SYSTOLIC BLOOD PRESSURE: 172 MMHG | RESPIRATION RATE: 19 BRPM | HEART RATE: 52 BPM | TEMPERATURE: 98.3 F

## 2020-02-25 DIAGNOSIS — D50.9 IRON DEFICIENCY ANEMIA, UNSPECIFIED IRON DEFICIENCY ANEMIA TYPE: Primary | ICD-10-CM

## 2020-02-25 LAB
ANION GAP SERPL CALC-SCNC: 5 MMOL/L (ref 3–18)
APTT PPP: 33.2 SEC (ref 23–36.4)
BASOPHILS # BLD: 0 K/UL (ref 0–0.1)
BASOPHILS NFR BLD: 0 % (ref 0–2)
BUN SERPL-MCNC: 23 MG/DL (ref 7–18)
BUN/CREAT SERPL: 19 (ref 12–20)
CALCIUM SERPL-MCNC: 8.1 MG/DL (ref 8.5–10.1)
CHLORIDE SERPL-SCNC: 109 MMOL/L (ref 100–111)
CO2 SERPL-SCNC: 29 MMOL/L (ref 21–32)
CREAT SERPL-MCNC: 1.21 MG/DL (ref 0.6–1.3)
DIFFERENTIAL METHOD BLD: ABNORMAL
EOSINOPHIL # BLD: 0.2 K/UL (ref 0–0.4)
EOSINOPHIL NFR BLD: 2 % (ref 0–5)
ERYTHROCYTE [DISTWIDTH] IN BLOOD BY AUTOMATED COUNT: 15.1 % (ref 11.6–14.5)
GLUCOSE SERPL-MCNC: 176 MG/DL (ref 74–99)
HCT VFR BLD AUTO: 23.2 % (ref 36–48)
HGB BLD-MCNC: 6.6 G/DL (ref 13–16)
LYMPHOCYTES # BLD: 1.1 K/UL (ref 0.9–3.6)
LYMPHOCYTES NFR BLD: 12 % (ref 21–52)
MAGNESIUM SERPL-MCNC: 2.4 MG/DL (ref 1.6–2.6)
MCH RBC QN AUTO: 20.6 PG (ref 24–34)
MCHC RBC AUTO-ENTMCNC: 28.4 G/DL (ref 31–37)
MCV RBC AUTO: 72.3 FL (ref 74–97)
MONOCYTES # BLD: 0.8 K/UL (ref 0.05–1.2)
MONOCYTES NFR BLD: 9 % (ref 3–10)
NEUTS SEG # BLD: 7.2 K/UL (ref 1.8–8)
NEUTS SEG NFR BLD: 77 % (ref 40–73)
PLATELET # BLD AUTO: 282 K/UL (ref 135–420)
PMV BLD AUTO: 10.4 FL (ref 9.2–11.8)
POTASSIUM SERPL-SCNC: 2.6 MMOL/L (ref 3.5–5.5)
RBC # BLD AUTO: 3.21 M/UL (ref 4.7–5.5)
SODIUM SERPL-SCNC: 143 MMOL/L (ref 136–145)
WBC # BLD AUTO: 9.4 K/UL (ref 4.6–13.2)

## 2020-02-25 PROCEDURE — 85025 COMPLETE CBC W/AUTO DIFF WBC: CPT

## 2020-02-25 PROCEDURE — P9016 RBC LEUKOCYTES REDUCED: HCPCS

## 2020-02-25 PROCEDURE — 93005 ELECTROCARDIOGRAM TRACING: CPT

## 2020-02-25 PROCEDURE — 86923 COMPATIBILITY TEST ELECTRIC: CPT

## 2020-02-25 PROCEDURE — 80048 BASIC METABOLIC PNL TOTAL CA: CPT

## 2020-02-25 PROCEDURE — 83735 ASSAY OF MAGNESIUM: CPT

## 2020-02-25 PROCEDURE — 36430 TRANSFUSION BLD/BLD COMPNT: CPT

## 2020-02-25 PROCEDURE — 99285 EMERGENCY DEPT VISIT HI MDM: CPT

## 2020-02-25 PROCEDURE — 74011250637 HC RX REV CODE- 250/637: Performed by: PHYSICIAN ASSISTANT

## 2020-02-25 PROCEDURE — 85730 THROMBOPLASTIN TIME PARTIAL: CPT

## 2020-02-25 PROCEDURE — 86900 BLOOD TYPING SEROLOGIC ABO: CPT

## 2020-02-25 RX ORDER — POTASSIUM CHLORIDE 20 MEQ/1
40 TABLET, EXTENDED RELEASE ORAL
Status: DISCONTINUED | OUTPATIENT
Start: 2020-02-25 | End: 2020-02-25

## 2020-02-25 RX ORDER — SODIUM CHLORIDE 9 MG/ML
250 INJECTION, SOLUTION INTRAVENOUS AS NEEDED
Status: DISCONTINUED | OUTPATIENT
Start: 2020-02-25 | End: 2020-02-25 | Stop reason: HOSPADM

## 2020-02-25 RX ADMIN — POTASSIUM BICARBONATE 40 MEQ: 782 TABLET, EFFERVESCENT ORAL at 15:23

## 2020-02-25 RX ADMIN — POTASSIUM BICARBONATE 20 MEQ: 782 TABLET, EFFERVESCENT ORAL at 15:23

## 2020-02-25 NOTE — DISCHARGE INSTRUCTIONS
Patient Education        Iron Deficiency Anemia: Care Instructions  Your Care Instructions    Anemia means that you do not have enough red blood cells. Red blood cells carry oxygen around your body. When you have anemia, it can make you pale, weak, and tired. Many things can cause anemia. The most common cause is loss of blood. This can happen if you have heavy menstrual periods. It can also happen if you have bleeding in your stomach or bowel. You can also get anemia if you don't have enough iron in your diet or if it's hard for your body to absorb iron. In some cases, pregnancy causes anemia. That's because a pregnant woman needs more iron. Your doctor may do more tests to find the cause of your anemia. If a disease or other health problem is causing it, your doctor will treat that problem. It's important to follow up with your doctor to make sure that your iron level returns to normal.  Follow-up care is a key part of your treatment and safety. Be sure to make and go to all appointments, and call your doctor if you are having problems. It's also a good idea to know your test results and keep a list of the medicines you take. How can you care for yourself at home? · If your doctor recommended iron pills, take them as directed. ? Try to take the pills on an empty stomach. You can do this about 1 hour before or 2 hours after meals. But you may need to take iron with food to avoid an upset stomach. ? Do not take antacids or drink milk or anything with caffeine within 2 hours of when you take your iron. They can keep your body from absorbing the iron well. ? Vitamin C helps your body absorb iron. You may want to take iron pills with a glass of orange juice or some other food high in vitamin C.  ? Iron pills may cause stomach problems. These include heartburn, nausea, diarrhea, constipation, and cramps. It can help to drink plenty of fluids and include fruits, vegetables, and fiber in your diet.   ? It's normal for iron pills to make your stool a greenish or grayish black. But internal bleeding can also cause dark stool. So it's important to tell your doctor about any color changes. ? Call your doctor if you think you are having a problem with your iron pills. Even after you start to feel better, it will take several months for your body to build up its supply of iron. ? If you miss a pill, don't take a double dose. ? Keep iron pills out of the reach of small children. Too much iron can be very dangerous. · Eat foods with a lot of iron. These include red meat, shellfish, poultry, and eggs. They also include beans, raisins, whole-grain bread, and leafy green vegetables. · Steam your vegetables. This is the best way to prepare them if you want to get as much iron as possible. · Be safe with medicines. Do not take nonsteroidal anti-inflammatory pain relievers unless your doctor tells you to. These include aspirin, naproxen (Aleve), and ibuprofen (Advil, Motrin). · Liquid iron can stain your teeth. But you can mix it with water or juice and drink it with a straw. Then it won't get on your teeth. When should you call for help? Call 911 anytime you think you may need emergency care. For example, call if:    · You passed out (lost consciousness).    Call your doctor now or seek immediate medical care if:    · You are short of breath.     · You are dizzy or light-headed, or you feel like you may faint.     · You have new or worse bleeding.    Watch closely for changes in your health, and be sure to contact your doctor if:    · You feel weaker or more tired than usual.     · You do not get better as expected. Where can you learn more? Go to http://tereza-freddie.info/. Enter R791 in the search box to learn more about \"Iron Deficiency Anemia: Care Instructions. \"  Current as of: March 28, 2019  Content Version: 12.2  © 5971-2094 [employer].  Care instructions adapted under license by [facility] (which disclaims liability or warranty for this information). If you have questions about a medical condition or this instruction, always ask your healthcare professional. [address] any warranty or liability for your use of this information. Patient Education        Learning About Blood Transfusions  What is a blood transfusion? Blood transfusion is a medical treatment to replace the blood or parts of blood that your body has lost. The blood goes through a tube from a bag to an intravenous (IV) catheter and into your vein. You may need a blood transfusion after losing blood from an injury, a major surgery, an illness that causes bleeding, or an illness that destroys blood cells. Transfusions are also used to give you the parts of blood--such as platelets, plasma, or substances that cause clotting--that your body needs to fight an illness or stop bleeding. How is a blood transfusion done? Before you receive a blood transfusion, your blood is tested to find out what your blood type is. Blood or blood parts that are a match with your blood type are ordered by your doctor. Blood is typed as A, B, AB, or O. It is also typed as Rh-positive or Rh-negative. Your blood is also screened to look for antibodies that might react with the blood that is given to you. The blood you are getting is checked and rechecked to make sure that it's the right type for you. A sample of your blood is mixed with a sample of the blood you will receive to check for problems. Before actually giving you the transfusion, a doctor and nurses will look at the label on the package of blood and compare it to your hospital ID bracelet and medical records. The transfusion begins only when all agree that this is the correct blood and that you are the correct person to receive it. To receive the transfusion, you will have an intravenous (IV) catheter inserted into a vein.  A tube connects the catheter to the bag containing the blood, which is placed higher than your body. The blood then flows slowly into your vein. A doctor or nurse will check you several times during the transfusion to watch for a reaction or other problems. What are the possible risks? Blood transfusions have many benefits and are often life-saving. But they also have a few risks. Possible risks include:  · Your body's reaction to receiving new blood. This may include:  ? Fever. ? Allergic reactions. ? Breathing problems. · An infection from the blood. This risk is small because of the strict rules placed on handling and storing blood. Getting a viral infection, such as HIV or hepatitis B or C, through blood transfusions has become very rare. The U.S. Food and Drug Administration (FDA) enforces strict guidelines on the collection, testing, storage, and use of blood. · Getting the wrong blood type by accident. Severe reactions, which can be life-threatening, are very rare. What can you expect after a blood transfusion? Here are some things you can do at home to help prevent infection at the transfusion site:  · Wash the area daily with warm, soapy water, and pat it dry. Don't use hydrogen peroxide or alcohol, which can slow healing. You may cover the area with a gauze bandage if it weeps or rubs against clothing. Change the bandage every day. · Keep the area clean and dry. When should you call for help? Call 911 anytime you think you may need emergency care. For example, call if:  · You have severe trouble breathing. Call your doctor now or seek immediate medical care if:  · You have a fever. · You feel weaker or more tired than usual.  · You have a yellow tint to your skin or the whites of your eyes. Watch closely for changes in your health, and be sure to contact your doctor if you have any problems. Follow-up care is a key part of your treatment and safety.  Be sure to make and go to all appointments, and call your doctor if you are having problems. It's also a good idea to know your test results and keep a list of the medicines you take. Where can you learn more? Go to http://tereza-freddie.info/. Enter V588 in the search box to learn more about \"Learning About Blood Transfusions. \"  Current as of: March 28, 2019  Content Version: 12.2  © 7896-0445 [employer]. Care instructions adapted under license by [employer] (which disclaims liability or warranty for this information). If you have questions about a medical condition or this instruction, always ask your healthcare professional. [place] [address] any warranty or liability for your use of this information.

## 2020-02-25 NOTE — ED NOTES
PMD sent patient into ED for low Hgb, 6.0    Pt denies any symptoms or bleeding     I performed a brief evaluation, including history and physical, of the patient here in triage and I have determined that pt will need further treatment and evaluation from the main side ER physician. I have placed initial orders to help in expediting patients care.      February 25, 2020 at 11:11 AM - PRANAY Lima

## 2020-02-26 ENCOUNTER — APPOINTMENT (OUTPATIENT)
Dept: PHYSICAL THERAPY | Age: 65
End: 2020-02-26
Payer: MEDICARE

## 2020-02-26 LAB
ABO + RH BLD: NORMAL
ATRIAL RATE: 50 BPM
BLD PROD TYP BPU: NORMAL
BLOOD GROUP ANTIBODIES SERPL: NORMAL
BPU ID: NORMAL
CALCULATED P AXIS, ECG09: 90 DEGREES
CALCULATED R AXIS, ECG10: 6 DEGREES
CALCULATED T AXIS, ECG11: 10 DEGREES
CALLED TO:,BCALL1: NORMAL
CROSSMATCH RESULT,%XM: NORMAL
DIAGNOSIS, 93000: NORMAL
P-R INTERVAL, ECG05: 268 MS
Q-T INTERVAL, ECG07: 480 MS
QRS DURATION, ECG06: 108 MS
QTC CALCULATION (BEZET), ECG08: 437 MS
SPECIMEN EXP DATE BLD: NORMAL
STATUS OF UNIT,%ST: NORMAL
UNIT DIVISION, %UDIV: 0
VENTRICULAR RATE, ECG03: 50 BPM

## 2020-02-26 NOTE — ED PROVIDER NOTES
EMERGENCY DEPARTMENT HISTORY AND PHYSICAL EXAM    Date: 2/25/2020  Patient Name: Ronni Li    History of Presenting Illness     Chief Complaint   Patient presents with    Anemia         History Provided By: patient        Additional History (Context): Ronni Li is a 60-year-old male presenting to the emergency department due to low hemoglobin. Patient was called by primary care physician this morning, instructed to come to emergency department for a blood transfusion. Patient has a history of iron deficiency anemia however denies ever needing a blood transfusion. Patient has no symptoms at present. Denies headache, lightheadedness, dizziness, chest pain, shortness of breath, abdominal pain, nausea vomiting, diarrhea and no history of blood in stools. Patient states he has been followed for low iron by PCP and has needed transfusions of iron in the past.    PCP: Salvador Caal NP    Current Outpatient Medications   Medication Sig Dispense Refill    glucose blood VI test strips (ASCENSIA AUTODISC VI, ONE TOUCH ULTRA TEST VI) strip Use one strip with glucometer daily in AM before breakfast and PRN (E11.21)      calcium-vitamin D (OYSTER SHELL) 500 mg(1,250mg) -200 unit per tablet Take 1 Tab by mouth.  hydroCHLOROthiazide (HYDRODIURIL) 25 mg tablet Take 25 mg by mouth.  lancets misc Prick finger with lancet once a day in AM before breakfast or PRN to monitor blood sugar (E11.21)      hydrALAZINE (APRESOLINE) 25 mg tablet Take 25 mg by mouth three (3) times daily.  finasteride (PROSCAR) 5 mg tablet Take 1 Tab by mouth daily. 90 Tab 4    baclofen (LIORESAL) 10 mg tablet Take 10 mg by mouth.  sertraline (ZOLOFT) 50 mg tablet       losartan (COZAAR) 100 mg tablet       carvediloL (COREG) 12.5 mg tablet Take 12.5 mg by mouth.  ascorbic acid, vitamin C, (VITAMIN C) 250 mg tablet Take 1 Tab by mouth daily (with breakfast).  30 Tab 0    ferrous sulfate 325 mg (65 mg iron) tablet Take 1 Tab by mouth daily (with breakfast). 30 Tab 0    hydrALAZINE (APRESOLINE) 25 mg tablet Take 3 Tabs by mouth every eight (8) hours. Indications: high blood pressure 270 Tab 0    aspirin 81 mg chewable tablet Take 1 Tab by mouth daily (with breakfast). Indications: stroke prevention 30 Tab 0    atorvastatin (LIPITOR) 80 mg tablet Take 1 Tab by mouth nightly. Indications: excessive fat in the blood, stroke prevention 30 Tab 0    cholecalciferol (VITAMIN D3) 5,000 unit capsule Take 1 Cap by mouth daily. Indications: vitamin D deficiency 30 Cap 0    amLODIPine (NORVASC) 10 mg tablet Take 10 mg by mouth daily.          Past History     Past Medical History:  Past Medical History:   Diagnosis Date    Acute ischemic stroke ([facility] [address] 8/12/2019    Acute Ischemic Stroke (acute/early subacute infarct at the left posterior basal ganglia to corona radiata) with residual right hemiparesis, dysphagia and dysarthria    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     On Allopurinol    Chronic hypokalemia     Persistent chronic hypokalemia + hypertension; ?primary hyperaldosteronism    CKD (chronic kidney disease) stage 2, GFR 60-89 ml/min 8/15/2019    Current use of aspirin 8/14/2019    Dysarthria 8/12/2019    Dysphagia 8/12/2019    Elevated prostate specific antigen (PSA) 7/21/2011    First degree atrioventricular block by electrocardiogram 8/12/2019    Hypertensive heart and kidney disease without heart failure and with stage 2 chronic kidney disease     Iron deficiency anemia 8/14/2019    Anemia work-up (8/14/2019) showed serum iron 22, TIBC 371, iron % saturation 6, ferritin 34, reticulocyte count 1.4     Obesity, Class I, BMI 30-34.9     Obstructive sleep apnea on CPAP     On statin therapy due to risk of future cardiovascular event 8/14/2019    On Atorvastatin    Primary osteoarthritis of right ankle 8/22/2019    X-ray of the right ankle (8/22/2019) showed no acute fracture or subluxation; advanced degenerative changes at the tibiotalar joint; old fracture fragment vs. accessory ossicle in the inferior aspect of the lateral malleolus; soft tissue swelling around the ankle.  Pure hypercholesterolemia 08/15/2019    Lipid profile (8/13/2019) showed , , HDL 42, ; Lipid profile (8/14/2019) showed , , HDL 39, LDL 94    Received intravenous tissue plasminogen activator (tPA) in emergency department 8/12/2019    Refusal of statin medication by patient 8/13/2019    As per note of Neurology (Dr. John Jeff), patient refuses statin agent because of potential side effects.  Right hemiparesis ([facility] [address] 8/12/2019    Secondary hyperparathyroidism of renal origin ([facility] [address] 8/18/2019    PTH (8/18/2019) = 101.7    Type 2 diabetes mellitus with stage 2 chronic kidney disease (HCC)     HbA1c (8/13/2019) = 6.6    Vitamin B12 deficiency anemia 8/14/2019    Vitamin B12 (8/14/2019) = 208    Vitamin D deficiency 8/19/2019    Vitamin D 25-Hydroxy (8/19/2019) = 16.2        Past Surgical History:  Past Surgical History:   Procedure Laterality Date    COLONOSCOPY N/A 4/15/2019    COLONOS[facility] Claudette Hernandez [facility] ENDOSCOPY    HX TONSILLECTOMY         Family History:  Family History   Problem Relation Age of Onset    Cancer Mother         Gastric cancer    Heart Disease Father     Prostate Cancer Father        Social History:  Social History     Tobacco Use    Smoking status: Former Smoker    Smokeless tobacco: Never Used   Substance Use Topics    Alcohol use: No     Alcohol/week: 0.0 standard drinks    Drug use: No       Allergies:  No Known Allergies      Review of Systems       Review of Systems   Constitutional: Negative for chills and fever. HENT: Negative for nasal congestion, sore throat, rhinorrhea  Eyes: Negative. Respiratory: Negative for cough and negative for shortness of breath. Cardiovascular: Negative for chest pain and palpitations. Gastrointestinal: Negative for abdominal pain, constipation, diarrhea, nausea and vomiting. Genitourinary: Negative. Negative for difficulty urinating and flank pain. Musculoskeletal: Negative for back pain. Negative for gait problem and neck pain. Skin: Negative. Allergic/Immunologic: Negative. Neurological: Negative for dizziness, weakness, numbness and headaches. Psychiatric/Behavioral: Negative. All other systems reviewed and are negative. All Other Systems Negative  Physical Exam     Vitals:    02/25/20 1500 02/25/20 1524 02/25/20 1530 02/25/20 1545   BP:  172/71 171/81 172/77   Pulse: (!) 51 (!) 51 (!) 57 (!) 52   Resp: 19 21 13 19   Temp:  98.3 °F (36.8 °C)     SpO2:  100% 100% 100%     Physical Exam  Vitals signs and nursing note reviewed. Constitutional:       General: He is not in acute distress. Appearance: He is well-developed. He is not diaphoretic. Comments: NAD, well hydrated, non toxic     HENT:      Head: Normocephalic and atraumatic. Nose: Nose normal.      Mouth/Throat:      Pharynx: No oropharyngeal exudate. Eyes:      Conjunctiva/sclera: Conjunctivae normal.      Pupils: Pupils are equal, round, and reactive to light. Neck:      Musculoskeletal: Normal range of motion and neck supple. Cardiovascular:      Rate and Rhythm: Normal rate and regular rhythm. Heart sounds: Normal heart sounds. No murmur. Pulmonary:      Effort: Pulmonary effort is normal. No respiratory distress. Breath sounds: Normal breath sounds. No wheezing or rales. Abdominal:      General: There is no distension. Palpations: Abdomen is soft. There is no mass. Tenderness: There is no abdominal tenderness. There is no right CVA tenderness, left CVA tenderness or guarding. Genitourinary:     Comments: Patient does not want rectal exam.  Musculoskeletal: Normal range of motion. Lymphadenopathy:      Cervical: No cervical adenopathy.    Skin:     General: Skin is warm.      Findings: No rash. Neurological:      Mental Status: He is alert and oriented to person, place, and time. Cranial Nerves: No cranial nerve deficit. Coordination: Coordination normal.   Psychiatric:         Behavior: Behavior normal.           Diagnostic Study Results     Labs -   No results found for this or any previous visit (from the past 12 hour(s)). Radiologic Studies -   No orders to display     CT Results  (Last 48 hours)    None        CXR Results  (Last 48 hours)    None            Medical Decision Making   I am the first provider for this patient. I reviewed the vital signs, available nursing notes, past medical history, past surgical history, family history and social history. Vital Signs-Reviewed the patient's vital signs. Records Reviewed: Nursing notes, old medical records and any previous labs, imaging, visits, consultations pertinent to patient care    Procedures:  Procedures      ED Course: Progress Notes, Reevaluation, and Consults:      Provider Notes (Medical Decision Making):   Labs reviewed, hemoglobin 6.6 HCT 23.2  Remaining labs reassuring. Exam unremarkable, abdomen is soft and non tender. Patient is refusing rectal exam, ED attending aware. Discussed patient with Dr. Ame Hodgson, he agrees with transfusion and patient and wife signed consent. Vital signs stable, toxic or septic. Patien received 1 unit of packed red blood cells without complication. Vital signs remained stable throughout. Patient has no symptoms and states he wants to leave emergency department at this time. Discussed with Dr. Ame Hodgson he does not want repeat hemoglobin and states patient is appropriate for discharge and follow-up with primary care physician. MED RECONCILIATION:  No current facility-administered medications for this encounter.       Current Outpatient Medications   Medication Sig    glucose blood VI test strips (ASCENSIA AUTODISC VI, ONE TOUCH ULTRA TEST VI) strip Use one strip with glucometer daily in AM before breakfast and PRN (E11.21)    calcium-vitamin D (OYSTER SHELL) 500 mg(1,250mg) -200 unit per tablet Take 1 Tab by mouth.  hydroCHLOROthiazide (HYDRODIURIL) 25 mg tablet Take 25 mg by mouth.  lancets misc Prick finger with lancet once a day in AM before breakfast or PRN to monitor blood sugar (E11.21)    hydrALAZINE (APRESOLINE) 25 mg tablet Take 25 mg by mouth three (3) times daily.  finasteride (PROSCAR) 5 mg tablet Take 1 Tab by mouth daily.  baclofen (LIORESAL) 10 mg tablet Take 10 mg by mouth.  sertraline (ZOLOFT) 50 mg tablet     losartan (COZAAR) 100 mg tablet     carvediloL (COREG) 12.5 mg tablet Take 12.5 mg by mouth.  ascorbic acid, vitamin C, (VITAMIN C) 250 mg tablet Take 1 Tab by mouth daily (with breakfast).  ferrous sulfate 325 mg (65 mg iron) tablet Take 1 Tab by mouth daily (with breakfast).  hydrALAZINE (APRESOLINE) 25 mg tablet Take 3 Tabs by mouth every eight (8) hours. Indications: high blood pressure    aspirin 81 mg chewable tablet Take 1 Tab by mouth daily (with breakfast). Indications: stroke prevention    atorvastatin (LIPITOR) 80 mg tablet Take 1 Tab by mouth nightly. Indications: excessive fat in the blood, stroke prevention    cholecalciferol (VITAMIN D3) 5,000 unit capsule Take 1 Cap by mouth daily. Indications: vitamin D deficiency    amLODIPine (NORVASC) 10 mg tablet Take 10 mg by mouth daily. Disposition:  home    DISCHARGE NOTE:     Pt has been reexamined. Patient has no new complaints, changes, or physical findings. Care plan outlined and precautions discussed. Discussed proper way to take medications. Discussed treatment plan, return precautions, symptomatic relief, and expected time to improvement. All questions answered. Patient is stable for discharge and outpatient management. Patient is ready to go home.     Follow-up Information     Follow up With Specialties Details Why Contact Info    [facility] EMERGENCY DEPT Emergency Medicine   [address] [place] [id number]  [address] [facility], [address] [place] [address],  Nurse Practitioner   [address]  [phone]            Discharge Medication List as of 2/25/2020  5:58 PM                Diagnosis     Clinical Impression:   1. Iron deficiency anemia, unspecified iron deficiency anemia type            Dictation disclaimer:  Please note that this dictation was completed with [employer], the computer voice recognition software. Quite often unanticipated grammatical, syntax, homophones, and other interpretive errors are inadvertently transcribed by the computer software. Please disregard these errors. Please excuse any errors that have escaped final proofreading.

## 2020-02-28 ENCOUNTER — APPOINTMENT (OUTPATIENT)
Dept: PHYSICAL THERAPY | Age: 65
End: 2020-02-28
Payer: MEDICARE

## 2020-03-02 ENCOUNTER — PATIENT OUTREACH (OUTPATIENT)
Dept: CASE MANAGEMENT | Age: 65
End: 2020-03-02

## 2020-03-02 NOTE — PROGRESS NOTES
Complex Case Management      Date/Time:  3/2/2020 11:46 AM    Method of communication with patient:phone    [facility] ([facility]) contacted the patient by telephone to perform Ambulatory Care Coordination. Verified name and  (PHI) with patient as identifiers. Provided introduction to self, and explanation of the Ambulatory Care Manager's role. Patient declines [facility]. Episode closed.

## 2020-03-11 ENCOUNTER — TELEPHONE (OUTPATIENT)
Dept: NEUROLOGY | Age: 65
End: 2020-03-11

## 2020-03-12 NOTE — TELEPHONE ENCOUNTER
He may come in to see me to discuss, but I do not not think that he should be driving until he goes through a former  rehabilitation/evaluation program, as he had a stroke that affected his right arm and leg.

## 2020-03-12 NOTE — TELEPHONE ENCOUNTER
Patient called this morning to notify Dr. Pily Flores he has been driving himself around for the past two months. 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 Patient is ready to go back to work and feels like he is capable of doing his job without problems or complication. Please advise.

## 2020-03-18 ENCOUNTER — OFFICE VISIT (OUTPATIENT)
Dept: NEUROLOGY | Age: 65
End: 2020-03-18

## 2020-03-18 VITALS
HEART RATE: 81 BPM | TEMPERATURE: 98.8 F | OXYGEN SATURATION: 98 % | BODY MASS INDEX: 30.66 KG/M2 | HEIGHT: 69 IN | SYSTOLIC BLOOD PRESSURE: 168 MMHG | RESPIRATION RATE: 16 BRPM | WEIGHT: 207 LBS | DIASTOLIC BLOOD PRESSURE: 78 MMHG

## 2020-03-18 DIAGNOSIS — I10 MALIGNANT HYPERTENSION: ICD-10-CM

## 2020-03-18 DIAGNOSIS — E66.01 MORBID OBESITY (HCC): ICD-10-CM

## 2020-03-18 DIAGNOSIS — I63.512 ACUTE ISCHEMIC LEFT MCA STROKE (HCC): ICD-10-CM

## 2020-03-18 DIAGNOSIS — G47.33 OSA (OBSTRUCTIVE SLEEP APNEA): Primary | ICD-10-CM

## 2020-03-18 NOTE — PROGRESS NOTES
3/18/2020 9:25 AM    SSN: [id number]    Subjective:   70 y/o male coming for f/u of STEFANIE as well as a history of a.   left posterior basal ganglia and corona radiata stroke in August 2019 leaving him with substantial right-sided hemiparesis. He remains compliant on ASV setting of 25/4, with download showing 30 out of 30 days of use through March 17 with an AHI of 5.3, central apnea index of 2.2 for an obstructive apnea hypopnea index of no more than 3.1, average use of 9 hours and 26 minutes, and very minimal leak. He feels rested, he is comfortable with his mask, he has no issues with this. He continues to see a nephrologist as well as his primary care physician for this. He continues to take multiple blood pressure medications. We reviewed his home sleep study on November 14 showed an AHI of 53 with desaturation down to a minimum of 70%. He continues on ASV with a max IPAP of 25 minimum EPAP of 4 with pressure support of 4.         Social History     Socioeconomic History    Marital status: UNKNOWN     Spouse name: Not on file    Number of children: Not on file    Years of education: Not on file    Highest education level: Not on file   Occupational History    Not on file   Social Needs    Financial resource strain: Not on file    Food insecurity     Worry: Not on file     Inability: Not on file    Transportation needs     Medical: Not on file     Non-medical: Not on file   Tobacco Use    Smoking status: Former Smoker    Smokeless tobacco: Never Used   Substance and Sexual Activity    Alcohol use: No     Alcohol/week: 0.0 standard drinks    Drug use: No    Sexual activity: Not on file   Lifestyle    Physical activity     Days per week: Not on file     Minutes per session: Not on file    Stress: Not on file   Relationships    Social connections     Talks on phone: Not on file     Gets together: Not on file     Attends Sabianism service: Not on file     Active member of club or organization: Not on file     Attends meetings of clubs or organizations: Not on file     Relationship status: Not on file    Intimate partner violence     Fear of current or ex partner: Not on file     Emotionally abused: Not on file     Physically abused: Not on file     Forced sexual activity: Not on file   Other Topics Concern    Not on file   Social History Narrative    Not on file       Family History   Problem Relation Age of Onset    Cancer Mother         Gastric cancer    Heart Disease Father     Prostate Cancer Father        Current Outpatient Medications   Medication Sig Dispense Refill    glucose blood VI test strips (ASCENSIA AUTODISC VI, ONE TOUCH ULTRA TEST VI) strip Use one strip with glucometer daily in AM before breakfast and PRN (E11.21)      calcium-vitamin D (OYSTER SHELL) 500 mg(1,250mg) -200 unit per tablet Take 1 Tab by mouth.  hydroCHLOROthiazide (HYDRODIURIL) 25 mg tablet Take 25 mg by mouth.  lancets misc Prick finger with lancet once a day in AM before breakfast or PRN to monitor blood sugar (E11.21)      hydrALAZINE (APRESOLINE) 25 mg tablet Take 25 mg by mouth three (3) times daily.  finasteride (PROSCAR) 5 mg tablet Take 1 Tab by mouth daily. 90 Tab 4    baclofen (LIORESAL) 10 mg tablet Take 10 mg by mouth.  sertraline (ZOLOFT) 50 mg tablet       losartan (COZAAR) 100 mg tablet       carvediloL (COREG) 12.5 mg tablet Take 12.5 mg by mouth.  ascorbic acid, vitamin C, (VITAMIN C) 250 mg tablet Take 1 Tab by mouth daily (with breakfast). 30 Tab 0    ferrous sulfate 325 mg (65 mg iron) tablet Take 1 Tab by mouth daily (with breakfast). 30 Tab 0    hydrALAZINE (APRESOLINE) 25 mg tablet Take 3 Tabs by mouth every eight (8) hours. Indications: high blood pressure 270 Tab 0    aspirin 81 mg chewable tablet Take 1 Tab by mouth daily (with breakfast).  Indications: stroke prevention 30 Tab 0    atorvastatin (LIPITOR) 80 mg tablet Take 1 Tab by mouth nightly. Indications: excessive fat in the blood, stroke prevention 30 Tab 0    cholecalciferol (VITAMIN D3) 5,000 unit capsule Take 1 Cap by mouth daily. Indications: vitamin D deficiency 30 Cap 0    amLODIPine (NORVASC) 10 mg tablet Take 10 mg by mouth daily. 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     On Allopurinol    Chronic hypokalemia     Persistent chronic hypokalemia + hypertension; ?primary hyperaldosteronism    CKD (chronic kidney disease) stage 2, GFR 60-89 ml/min 8/15/2019    Current use of aspirin 8/14/2019    Dysarthria 8/12/2019    Dysphagia 8/12/2019    Elevated prostate specific antigen (PSA) 7/21/2011    First degree atrioventricular block by electrocardiogram 8/12/2019    Hypertensive heart and kidney disease without heart failure and with stage 2 chronic kidney disease     Iron deficiency anemia 8/14/2019    Anemia work-up (8/14/2019) showed serum iron 22, TIBC 371, iron % saturation 6, ferritin 34, reticulocyte count 1.4     Obesity, Class I, BMI 30-34.9     Obstructive sleep apnea on CPAP     On statin therapy due to risk of future cardiovascular event 8/14/2019    On Atorvastatin    Primary osteoarthritis of right ankle 8/22/2019    X-ray of the right ankle (8/22/2019) showed no acute fracture or subluxation; advanced degenerative changes at the tibiotalar joint; old fracture fragment vs. accessory ossicle in the inferior aspect of the lateral malleolus; soft tissue swelling around the ankle.     Pure hypercholesterolemia 08/15/2019    Lipid profile (8/13/2019) showed , , HDL 42, ; Lipid profile (8/14/2019) showed , , HDL 39, LDL 94    Received intravenous tissue plasminogen activator (tPA) in emergency department 8/12/2019    Refusal of statin medication by patient 8/13/2019    As per note of Neurology (Dr. Mickey Lofton), patient refuses statin agent because of potential side effects.  Right hemiparesis ([facility] [address] 8/12/2019    Secondary hyperparathyroidism of renal origin ([facility] [address] 8/18/2019    PTH (8/18/2019) = 101.7    Type 2 diabetes mellitus with stage 2 chronic kidney disease (HCC)     HbA1c (8/13/2019) = 6.6    Vitamin B12 deficiency anemia 8/14/2019    Vitamin B12 (8/14/2019) = 208    Vitamin D deficiency 8/19/2019    Vitamin D 25-Hydroxy (8/19/2019) = 16.2        Past Surgical History:   Procedure Laterality Date    COLONOSCOPY N/A 4/15/2019    COLONOSCOPY performed by Jasper Mendez MD at [facility] ENDOSCOPY    HX TONSILLECTOMY         No Known Allergies    Vital signs:    Visit Vitals  /78 (BP 1 Location: Left arm, BP Patient Position: Sitting)   Pulse 81   Temp 98.8 °F (37.1 °C) (Oral)   Resp 16   Ht 5' 9\" (1.753 m)   Wt 93.9 kg (207 lb)   SpO2 98%   BMI 30.57 kg/m²       Review of Systems:   GENERAL: Denies fever or fatigue  CARDIAC: No CP or SOB  PULMONARY: No cough of SOB  MUSCULOSKELETAL: No new joint pain  NEURO: SEE HPI      EXAM: Alert, in NAD. Heart is regular. Oriented x3, EOM's are full, PERRL, right lower facial droop with slight dysarthria flaccid right upper extremity paresis, 3/5 right lower extremity, right-sided hemiparetic and stiff gait, response time is reduced in the right foot DTRs +3 on the right. Assessment/Plan: STEFANIE with a central component, with an improving overall central and obstructive apnea index on ASV at current settings. He is compliant. He will continue doing this. He does not need any adjustments. As far as his stroke goes, he has a pretty substantial right upper extremity paresis and has also problems with coordination and strength of the right lower extremity.   I therefore advised him that he must refrain from driving until he undergoes a  evaluation program.  He was quite insistent that he needed this for his livelihood and I explained that I just cannot clear him without him going through proper evaluation process which we will try to expedite as much as we can on our end. He will return to see me 1 to 2 days after his  rehabilitation evaluation is completed. Furthermore, I am forwarding my note to his nephrologist as well as his primary care physician because his hypertension remains uncontrolled and I am not aware that he has had evaluation for pheochromocytoma. I advised to follow-up with his primary care physician for this. 25 out of 40 minutes were spent counseling on issues regarding his CPAP, his stroke, prevention, his hypertension, pending work-up, and issues of driving in the process needed to make sure that if he can drive that he does so safely and with proper adaptive equipment to be determined by a  evaluation process. PLEASE NOTE:   Portions of this document may have been produced using voice recognition software. Unrecognized errors in transcription may be present. This note will not be viewable in [address]

## 2020-03-18 NOTE — PROGRESS NOTES
Identified pt with two pt identifiers(name and ). Reviewed record in preparation for visit and have obtained necessary documentation. Chief Complaint   Patient presents with    Follow-up     CPAP    Form Completion     [place] to drive        Health Maintenance Due   Topic    Hepatitis C Screening     Foot Exam Q1     Eye Exam Retinal or Dilated     DTaP/Tdap/Td series (1 - Tdap)    Shingrix Vaccine Age 50> (1 of 2)    FOBT Q1Y Age 54-65     GLAUCOMA SCREENING Q2Y     Pneumococcal 65+ years (1 of 1 - PPSV23)    Influenza Age 5 to Adult     Medicare Yearly Exam        Coordination of Care Questionnaire:  :   1) Have you been to an emergency room, urgent care, or hospitalized since your last visit? If yes, where when, and reason for visit? no       2. Have seen or consulted any other health care provider since your last visit? If yes, where when, and reason for visit? NO      3) Do you have an Advanced Directive/ Living Will in place? NO  If yes, do we have a copy on file NO  If no, would you like information NO      Learning Assessment 3/18/2020   PRIMARY LEARNER Patient   HIGHEST LEVEL OF EDUCATION - PRIMARY LEARNER  GRADUATED HIGH SCHOOL OR GED   BARRIERS PRIMARY LEARNER NONE   CO-LEARNER CAREGIVER No   PRIMARY LANGUAGE ENGLISH   LEARNER PREFERENCE PRIMARY READING     LISTENING   ANSWERED BY Patient   RELATIONSHIP SELF        3 most recent PHQ Screens 3/18/2020   Little interest or pleasure in doing things Not at all   Feeling down, depressed, irritable, or hopeless Not at all   Total Score PHQ 2 0        Abuse Screening Questionnaire 3/18/2020   Do you ever feel afraid of your partner? N   Are you in a relationship with someone who physically or mentally threatens you? N   Is it safe for you to go home? Y        Fall Risk Assessment, last 12 mths 3/18/2020   Able to walk? Yes   Fall in past 12 months?  No

## 2020-03-25 NOTE — ANCILLARY DISCHARGE INSTRUCTIONS
[facility] - Ron Escamilla  [address]  [phone] fax    Physical Therapy Discharge Summary         Patient name: Deon Archer Start of Care: 10/22/19   Referral source: Erica Crockett NP : [id number]   Medical/Treatment Diagnosis: Lower extremity weakness [R29.898]  Hemiplegia, unspecified affecting right dominant side [G81.91]  Payor: [place] MEDICARE / Plan: [place] MEDICARE PART A & B / Product Type: Medicare /  Onset Date:19      Prior Hospitalization: see medical history Provider#: [id number]   Medications: Verified on Patient Summary List      Comorbidities: Diabetes, HBP, OA of right Ankle,Kidney disease, Dysphagia and /Dysarthria   Prior Level of Function: Retired "[employer]. Likes to attend [employer] reinactments,     Visits from Start of Care: 38    Missed Visits: 0    Reporting Period : 20 to 20    Summary of Care:  Goal:. Pt will ambulate 4 steps x 3 with 1 HR with supervision to be able to navigate stairs at home  Status at last note/certification: Ambulate 4 steps with HHA. Status at discharge: met    Goal:  Pt will ambulate >200 ' with [facility] and no evidence of instability in order to improve ease of household negotiation with LRAD  Status at last note/certification:Moderate toe drag possibly due to weak ankles. Status at discharge: Progressing minor toe drag with fatigue and right genu recurvatum at midstance with fatigue >200 feet    Goal:Pt will ambulate outdoors on grass, curbs, with SBA/Supervision w/o LOB to be DC'd to negotiating household and short community distances safely at D/C. Status at last note/certification: progress to outdoors. Status at discharge: met    Pt progressed well towards final goals in therapy. He was able to negotiate stairs reciprocally with 1 HR with supervision and navigate community distances outdoors without LOB.  He is able to ambulate with SPC but demonstrates minor instability at end of larger distances with toe drag and genu recurvatum at midstance. Pt is ready to D/C at this time to continue independent progression at home with strengthening/stretching for further gains with ambulation and transfers. Pt DC'd to HEP.      ASSESSMENT/RECOMMENDATIONS:  [x]Discontinue therapy: [x]Patient has reached or is progressing toward set goals      []Patient is non-compliant or has abdicated      []Due to lack of appreciable progress towards set goals    Huma Pardo, PT 3/25/2020 10:15 AM

## 2020-06-16 ENCOUNTER — OFFICE VISIT (OUTPATIENT)
Dept: NEUROLOGY | Age: 65
End: 2020-06-16

## 2020-06-16 VITALS
BODY MASS INDEX: 30.9 KG/M2 | TEMPERATURE: 96.8 F | RESPIRATION RATE: 18 BRPM | WEIGHT: 208.6 LBS | SYSTOLIC BLOOD PRESSURE: 170 MMHG | DIASTOLIC BLOOD PRESSURE: 76 MMHG | HEIGHT: 69 IN | OXYGEN SATURATION: 97 % | HEART RATE: 68 BPM

## 2020-06-16 DIAGNOSIS — E66.01 MORBID OBESITY (HCC): ICD-10-CM

## 2020-06-16 DIAGNOSIS — G47.33 OSA (OBSTRUCTIVE SLEEP APNEA): Primary | ICD-10-CM

## 2020-06-16 DIAGNOSIS — I63.512 ACUTE ISCHEMIC LEFT MCA STROKE (HCC): ICD-10-CM

## 2020-06-16 DIAGNOSIS — I10 MALIGNANT HYPERTENSION: ICD-10-CM

## 2020-06-16 NOTE — PATIENT INSTRUCTIONS
Thank you for choosing [facility] and [facility] for your  
 
care. You may receive a survey about your visit. We appreciate you taking time  
 
to complete this survey as we use your feedback to improve our services. We  
 
realize we are not perfect, but we strive to provide excellent care.

## 2020-06-16 NOTE — PROGRESS NOTES
Kimberlee Cox presents today for   Chief Complaint   Patient presents with    Neurologic Problem     stroke up    Sleep Problem     follow up       Is someone accompanying this pt? no    Is the patient using any DME equipment during OV? Depression Screening:  3 most recent PHQ Screens 3/18/2020   Little interest or pleasure in doing things Not at all   Feeling down, depressed, irritable, or hopeless Not at all   Total Score PHQ 2 0       Learning Assessment:  Learning Assessment 3/18/2020   PRIMARY LEARNER Patient   HIGHEST LEVEL OF EDUCATION - PRIMARY LEARNER  GRADUATED HIGH SCHOOL OR GED   BARRIERS PRIMARY LEARNER NONE   CO-LEARNER CAREGIVER No   PRIMARY LANGUAGE ENGLISH   LEARNER PREFERENCE PRIMARY READING     LISTENING   ANSWERED BY Patient   RELATIONSHIP SELF       Abuse Screening:  Abuse Screening Questionnaire 3/18/2020   Do you ever feel afraid of your partner? N   Are you in a relationship with someone who physically or mentally threatens you? N   Is it safe for you to go home? Y       Fall Risk  Fall Risk Assessment, last 12 mths 6/16/2020   Able to walk? Yes   Fall in past 12 months? No         Coordination of Care:  1. Have you been to the ER, urgent care clinic since your last visit? Hospitalized since your last visit? no    2. Have you seen or consulted any other health care providers outside of the [address] [place] [address] since your last visit? Include any pap smears or colon screening.  no

## 2020-06-16 NOTE — PROGRESS NOTES
6/16/2020 9:25 AM    SSN: [id number]    Subjective:   70 y/o male coming for f/u of STEFANIE as well as a history of a left posterior basal ganglia and corona radiata stroke in August 2019 leaving him with substantial right-sided hemiparesis. Download through [employer] showed 30 out of 30 days of use with average use of 9 hours and 40 minutes, with an AHI of 6.1, 1.6 events per hour appeared to be centrals on the download, on ASV with a max IPAP of 25 minimum EPAP of 4 with pressure support of 4. He feels rested, he is comfortable with his mask. He continues to take multiple blood pressure medications. His blood pressure remains elevated. He has not seen his nephrologist since he last saw me, and my recommendation had been to checking for pheochromocytoma given his malignant hypertension and the risk of stroke recurrence this poses. He has never had to collect urine for 24 hours. I do not see any evaluation for 24-hour metanephrines. We reviewed his home sleep study on November 14 showed an AHI of 53 with desaturation down to a minimum of 70%.        Social History     Socioeconomic History    Marital status: UNKNOWN     Spouse name: Not on file    Number of children: Not on file    Years of education: Not on file    Highest education level: Not on file   Occupational History    Not on file   Social Needs    Financial resource strain: Not on file    Food insecurity     Worry: Not on file     Inability: Not on file    Transportation needs     Medical: Not on file     Non-medical: Not on file   Tobacco Use    Smoking status: Former Smoker    Smokeless tobacco: Never Used   Substance and Sexual Activity    Alcohol use: No     Alcohol/week: 0.0 standard drinks    Drug use: No    Sexual activity: Not on file   Lifestyle    Physical activity     Days per week: Not on file     Minutes per session: Not on file    Stress: Not on file   Relationships    Social connections     Talks on phone: Not on file     Gets together: Not on file     Attends Restorationist service: Not on file     Active member of club or organization: Not on file     Attends meetings of clubs or organizations: Not on file     Relationship status: Not on file    Intimate partner violence     Fear of current or ex partner: Not on file     Emotionally abused: Not on file     Physically abused: Not on file     Forced sexual activity: Not on file   Other Topics Concern    Not on file   Social History Narrative    Not on file       Family History   Problem Relation Age of Onset    Cancer Mother         Gastric cancer    Heart Disease Father     Prostate Cancer Father        Current Outpatient Medications   Medication Sig Dispense Refill    glucose blood VI test strips (ASCENSIA AUTODISC VI, ONE TOUCH ULTRA TEST VI) strip Use one strip with glucometer daily in AM before breakfast and PRN (E11.21)      calcium-vitamin D (OYSTER SHELL) 500 mg(1,250mg) -200 unit per tablet Take 1 Tab by mouth.  hydroCHLOROthiazide (HYDRODIURIL) 25 mg tablet Take 25 mg by mouth.  lancets misc Prick finger with lancet once a day in AM before breakfast or PRN to monitor blood sugar (E11.21)      hydrALAZINE (APRESOLINE) 25 mg tablet Take 25 mg by mouth three (3) times daily.  finasteride (PROSCAR) 5 mg tablet Take 1 Tab by mouth daily. 90 Tab 4    baclofen (LIORESAL) 10 mg tablet Take 10 mg by mouth.  sertraline (ZOLOFT) 50 mg tablet       losartan (COZAAR) 100 mg tablet       carvediloL (COREG) 12.5 mg tablet Take 12.5 mg by mouth.  ascorbic acid, vitamin C, (VITAMIN C) 250 mg tablet Take 1 Tab by mouth daily (with breakfast). 30 Tab 0    ferrous sulfate 325 mg (65 mg iron) tablet Take 1 Tab by mouth daily (with breakfast). 30 Tab 0    hydrALAZINE (APRESOLINE) 25 mg tablet Take 3 Tabs by mouth every eight (8) hours.  Indications: high blood pressure 270 Tab 0    aspirin 81 mg chewable tablet Take 1 Tab by mouth daily (with breakfast). Indications: stroke prevention 30 Tab 0    atorvastatin (LIPITOR) 80 mg tablet Take 1 Tab by mouth nightly. Indications: excessive fat in the blood, stroke prevention 30 Tab 0    cholecalciferol (VITAMIN D3) 5,000 unit capsule Take 1 Cap by mouth daily. Indications: vitamin D deficiency 30 Cap 0    amLODIPine (NORVASC) 10 mg tablet Take 10 mg by mouth daily. 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     On Allopurinol    Chronic hypokalemia     Persistent chronic hypokalemia + hypertension; ?primary hyperaldosteronism    CKD (chronic kidney disease) stage 2, GFR 60-89 ml/min 8/15/2019    Current use of aspirin 8/14/2019    Dysarthria 8/12/2019    Dysphagia 8/12/2019    Elevated prostate specific antigen (PSA) 7/21/2011    First degree atrioventricular block by electrocardiogram 8/12/2019    Hypertensive heart and kidney disease without heart failure and with stage 2 chronic kidney disease     Iron deficiency anemia 8/14/2019    Anemia work-up (8/14/2019) showed serum iron 22, TIBC 371, iron % saturation 6, ferritin 34, reticulocyte count 1.4     Obesity, Class I, BMI 30-34.9     Obstructive sleep apnea on CPAP     On statin therapy due to risk of future cardiovascular event 8/14/2019    On Atorvastatin    Primary osteoarthritis of right ankle 8/22/2019    X-ray of the right ankle (8/22/2019) showed no acute fracture or subluxation; advanced degenerative changes at the tibiotalar joint; old fracture fragment vs. accessory ossicle in the inferior aspect of the lateral malleolus; soft tissue swelling around the ankle.     Pure hypercholesterolemia 08/15/2019    Lipid profile (8/13/2019) showed , , HDL 42, ; Lipid profile (8/14/2019) showed , , HDL 39, LDL 94    Received intravenous tissue plasminogen activator (tPA) in emergency department 8/12/2019    Refusal of statin medication by patient 8/13/2019    As per note of Neurology (Dr. Lisa Matthew), patient refuses statin agent because of potential side effects.  Right hemiparesis ([facility] [address] 8/12/2019    Secondary hyperparathyroidism of renal origin ([facility] [address] 8/18/2019    PTH (8/18/2019) = 101.7    Type 2 diabetes mellitus with stage 2 chronic kidney disease (HCC)     HbA1c (8/13/2019) = 6.6    Vitamin B12 deficiency anemia 8/14/2019    Vitamin B12 (8/14/2019) = 208    Vitamin D deficiency 8/19/2019    Vitamin D 25-Hydroxy (8/19/2019) = 16.2        Past Surgical History:   Procedure Laterality Date    COLONOSCOPY N/A 4/15/2019    COLONOSCOPY performed by Rojelio Doty MD at [facility] ENDOSCOPY    HX TONSILLECTOMY         No Known Allergies    Vital signs:    Visit Vitals  /76 (BP 1 Location: Left arm, BP Patient Position: Sitting)   Pulse 68   Temp 96.8 °F (36 °C)   Resp 18   Ht 5' 9\" (1.753 m)   Wt 94.6 kg (208 lb 9.6 oz)   SpO2 97%   BMI 30.80 kg/m²       Review of Systems:   GENERAL: Denies fever or fatigue  CARDIAC: No CP or SOB  PULMONARY: No cough of SOB  MUSCULOSKELETAL: No new joint pain  NEURO: SEE HPI      EXAM: Alert, in NAD. Heart is regular. Oriented x3, EOM's are full, PERRL, right lower facial droop with slight dysarthria flaccid right upper extremity paresis, 3/5 right lower extremity, right-sided hemiparetic and stiff gait, response time is reduced in the right foot DTRs +3 on the right. Assessment/Plan: STEFANIE with a central component, with a great looking download on current ASV settings, which he will continue. I commended him on his compliance. He continues to have problems with blood pressure control and he had a substantial left MCA stroke resulting in severe right-sided hemiparesis.   I will order a 24-hour urine collection for metanephrine. Renal artery stenosis is in the differential diagnosis, I will defer this to his nephrologist and primary care physician, but at any rate his hypertension must be aggressively controlled. He is on 5 blood pressure medications and still gets systolics of 362. Regarding his driving, his  evaluation report was reviewed. They suggest that he does not need any restrictions but that he will need the DMV road as well as knowledge tests and yearly reviews, so I have filled his forms according to the recommendations. ASV at current settings. He remains compliant, commended him. Counseled him about obstructive sleep apnea and consequences. He well will continue current treatment. 25 out of 40 minutes were spent counseling on issues mostly related to his severe hypertension and the needed pending work-up, the risk of stroke with uncontrolled hypertension, CPAP compliance, driving restrictions and precautions. PLEASE NOTE:   Portions of this document may have been produced using voice recognition software. Unrecognized errors in transcription may be present. This note will not be viewable in [address]

## 2020-06-18 ENCOUNTER — HOSPITAL ENCOUNTER (OUTPATIENT)
Dept: LAB | Age: 65
Discharge: HOME | End: 2020-06-18
Payer: MEDICARE

## 2020-06-18 DIAGNOSIS — I10 MALIGNANT HYPERTENSION: ICD-10-CM

## 2020-06-18 DIAGNOSIS — I63.512 ACUTE ISCHEMIC LEFT MCA STROKE (HCC): ICD-10-CM

## 2020-06-18 PROCEDURE — 36415 COLL VENOUS BLD VENIPUNCTURE: CPT

## 2020-06-18 PROCEDURE — 83835 ASSAY OF METANEPHRINES: CPT

## 2020-06-22 LAB
COLLECT DURATION TIME UR: ABNORMAL HR
METANEPH 24H UR-MRATE: 0 UG/24 HR (ref 58–276)
METANEPHS 24H UR-MCNC: 82 UG/L
NORMETANEPHRINE 24H UR-MCNC: 120 UG/L
NORMETANEPHRINE 24H UR-MRATE: 1 UG/24 HR (ref 156–729)
SPECIMEN VOL ?TM UR: 930 ML

## 2020-07-23 ENCOUNTER — OFFICE VISIT (OUTPATIENT)
Dept: NEUROLOGY | Age: 65
End: 2020-07-23

## 2020-07-23 VITALS
BODY MASS INDEX: 29.92 KG/M2 | DIASTOLIC BLOOD PRESSURE: 80 MMHG | SYSTOLIC BLOOD PRESSURE: 160 MMHG | OXYGEN SATURATION: 98 % | TEMPERATURE: 98 F | RESPIRATION RATE: 18 BRPM | HEIGHT: 69 IN | HEART RATE: 58 BPM | WEIGHT: 202 LBS

## 2020-07-23 DIAGNOSIS — G47.33 OSA (OBSTRUCTIVE SLEEP APNEA): Primary | ICD-10-CM

## 2020-07-23 DIAGNOSIS — I10 MALIGNANT HYPERTENSION: ICD-10-CM

## 2020-07-23 DIAGNOSIS — I63.512 ACUTE ISCHEMIC LEFT MCA STROKE (HCC): ICD-10-CM

## 2020-07-23 RX ORDER — OLMESARTAN MEDOXOMIL 40 MG/1
40 TABLET ORAL DAILY
COMMUNITY

## 2020-07-23 NOTE — PROGRESS NOTES
Lanny Rodgers presents today for   Chief Complaint   Patient presents with    Sleep Problem     follow up       Is someone accompanying this pt? no    Is the patient using any DME equipment during OV? no    Depression Screening:  3 most recent PHQ Screens 3/18/2020   Little interest or pleasure in doing things Not at all   Feeling down, depressed, irritable, or hopeless Not at all   Total Score PHQ 2 0       Learning Assessment:  Learning Assessment 3/18/2020   PRIMARY LEARNER Patient   HIGHEST LEVEL OF EDUCATION - PRIMARY LEARNER  GRADUATED HIGH SCHOOL OR GED   BARRIERS PRIMARY LEARNER NONE   CO-LEARNER CAREGIVER No   PRIMARY LANGUAGE ENGLISH   LEARNER PREFERENCE PRIMARY READING     LISTENING   ANSWERED BY Patient   RELATIONSHIP SELF       Abuse Screening:  Abuse Screening Questionnaire 3/18/2020   Do you ever feel afraid of your partner? N   Are you in a relationship with someone who physically or mentally threatens you? N   Is it safe for you to go home? Y       Fall Risk  Fall Risk Assessment, last 12 mths 7/23/2020   Able to walk? Yes   Fall in past 12 months? Yes   Fall with injury? No   Number of falls in past 12 months 1   Fall Risk Score 1         Coordination of Care:  1. Have you been to the ER, urgent care clinic since your last visit? Hospitalized since your last visit? no    2. Have you seen or consulted any other health care providers outside of the [address] [place] [address] since your last visit? Include any pap smears or colon screening.  no

## 2020-07-23 NOTE — PROGRESS NOTES
7/23/2020 9:25 AM    SSN: [id number]    Subjective:   72 y/o male coming for f/u of STEFANIE as well as a history of a left posterior basal ganglia and corona radiata stroke in August 2019 leaving him with substantial right-sided hemiparesis. Last visit was in June. Since his last visit he has remained compliant on ASV with a max IPAP of 25 minimum EPAP of 4 with pressure support of 4. His download through July 21 showed 30 out of 30 days use with median use of 10 hours and 35 minutes, and overall AHI of 6.0 with a central apnea index of 1.9 for a corrected obstructive index of 4.1, and a median leak of 0. He feels comfortable, he is compliant, he feels very rested when he uses his machine. He had a negative evaluation for 24-hour urine collection for metanephrines. Again reviewed his home sleep study on November 14 showed an AHI of 53 with desaturation down to a minimum of 70%.        Social History     Socioeconomic History    Marital status: UNKNOWN     Spouse name: Not on file    Number of children: Not on file    Years of education: Not on file    Highest education level: Not on file   Occupational History    Not on file   Social Needs    Financial resource strain: Not on file    Food insecurity     Worry: Not on file     Inability: Not on file    Transportation needs     Medical: Not on file     Non-medical: Not on file   Tobacco Use    Smoking status: Former Smoker    Smokeless tobacco: Never Used   Substance and Sexual Activity    Alcohol use: No     Alcohol/week: 0.0 standard drinks    Drug use: No    Sexual activity: Not on file   Lifestyle    Physical activity     Days per week: Not on file     Minutes per session: Not on file    Stress: Not on file   Relationships    Social connections     Talks on phone: Not on file     Gets together: Not on file     Attends Shinto service: Not on file     Active member of club or organization: Not on file     Attends meetings of clubs or organizations: Not on file     Relationship status: Not on file    Intimate partner violence     Fear of current or ex partner: Not on file     Emotionally abused: Not on file     Physically abused: Not on file     Forced sexual activity: Not on file   Other Topics Concern    Not on file   Social History Narrative    Not on file       Family History   Problem Relation Age of Onset    Cancer Mother         Gastric cancer    Heart Disease Father     Prostate Cancer Father        Current Outpatient Medications   Medication Sig Dispense Refill    glucose blood VI test strips (ASCENSIA AUTODISC VI, ONE TOUCH ULTRA TEST VI) strip Use one strip with glucometer daily in AM before breakfast and PRN (E11.21)      calcium-vitamin D (OYSTER SHELL) 500 mg(1,250mg) -200 unit per tablet Take 1 Tab by mouth.  hydroCHLOROthiazide (HYDRODIURIL) 25 mg tablet Take 25 mg by mouth.  lancets misc Prick finger with lancet once a day in AM before breakfast or PRN to monitor blood sugar (E11.21)      hydrALAZINE (APRESOLINE) 25 mg tablet Take 25 mg by mouth three (3) times daily.  finasteride (PROSCAR) 5 mg tablet Take 1 Tab by mouth daily. 90 Tab 4    baclofen (LIORESAL) 10 mg tablet Take 10 mg by mouth.  sertraline (ZOLOFT) 50 mg tablet       losartan (COZAAR) 100 mg tablet       carvediloL (COREG) 12.5 mg tablet Take 12.5 mg by mouth.  ascorbic acid, vitamin C, (VITAMIN C) 250 mg tablet Take 1 Tab by mouth daily (with breakfast). 30 Tab 0    ferrous sulfate 325 mg (65 mg iron) tablet Take 1 Tab by mouth daily (with breakfast). 30 Tab 0    hydrALAZINE (APRESOLINE) 25 mg tablet Take 3 Tabs by mouth every eight (8) hours. Indications: high blood pressure 270 Tab 0    aspirin 81 mg chewable tablet Take 1 Tab by mouth daily (with breakfast). Indications: stroke prevention 30 Tab 0    atorvastatin (LIPITOR) 80 mg tablet Take 1 Tab by mouth nightly.  Indications: excessive fat in the blood, stroke prevention 30 Tab 0    cholecalciferol (VITAMIN D3) 5,000 unit capsule Take 1 Cap by mouth daily. Indications: vitamin D deficiency 30 Cap 0    amLODIPine (NORVASC) 10 mg tablet Take 10 mg by mouth daily. 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     On Allopurinol    Chronic hypokalemia     Persistent chronic hypokalemia + hypertension; ?primary hyperaldosteronism    CKD (chronic kidney disease) stage 2, GFR 60-89 ml/min 8/15/2019    Current use of aspirin 8/14/2019    Dysarthria 8/12/2019    Dysphagia 8/12/2019    Elevated prostate specific antigen (PSA) 7/21/2011    First degree atrioventricular block by electrocardiogram 8/12/2019    Hypertensive heart and kidney disease without heart failure and with stage 2 chronic kidney disease     Iron deficiency anemia 8/14/2019    Anemia work-up (8/14/2019) showed serum iron 22, TIBC 371, iron % saturation 6, ferritin 34, reticulocyte count 1.4     Obesity, Class I, BMI 30-34.9     Obstructive sleep apnea on CPAP     On statin therapy due to risk of future cardiovascular event 8/14/2019    On Atorvastatin    Primary osteoarthritis of right ankle 8/22/2019    X-ray of the right ankle (8/22/2019) showed no acute fracture or subluxation; advanced degenerative changes at the tibiotalar joint; old fracture fragment vs. accessory ossicle in the inferior aspect of the lateral malleolus; soft tissue swelling around the ankle.     Pure hypercholesterolemia 08/15/2019    Lipid profile (8/13/2019) showed , , HDL 42, ; Lipid profile (8/14/2019) showed , , HDL 39, LDL 94    Received intravenous tissue plasminogen activator (tPA) in emergency department 8/12/2019    Refusal of statin medication by patient 8/13/2019    As per note of Neurology (Dr. Sher Villarreal), patient refuses statin agent because of potential side effects.  Right hemiparesis ([facility]) 8/12/2019    Secondary hyperparathyroidism of renal origin ([facility]) 8/18/2019    PTH (8/18/2019) = 101.7    Type 2 diabetes mellitus with stage 2 chronic kidney disease (HCC)     HbA1c (8/13/2019) = 6.6    Vitamin B12 deficiency anemia 8/14/2019    Vitamin B12 (8/14/2019) = 208    Vitamin D deficiency 8/19/2019    Vitamin D 25-Hydroxy (8/19/2019) = 16.2        Past Surgical History:   Procedure Laterality Date    COLONOSCOPY N/A 4/15/2019    COLONOSCOPY performed by Kamran Maxwell MD at [facility] ENDOSCOPY    HX TONSILLECTOMY         No Known Allergies    Vital signs:    Visit Vitals  /80 (BP 1 Location: Left arm, BP Patient Position: Sitting)   Pulse (!) 58   Temp 98 °F (36.7 °C)   Resp 18   Ht 5' 9\" (1.753 m)   Wt 91.6 kg (202 lb)   SpO2 98%   BMI 29.83 kg/m²       Review of Systems:   GENERAL: Denies fever or fatigue  CARDIAC: No CP or SOB  PULMONARY: No cough of SOB  MUSCULOSKELETAL: No new joint pain  NEURO: SEE HPI      EXAM: Alert, in NAD. Heart is regular. Oriented x3, EOM's are full, PERRL, right lower facial droop with slight dysarthria flaccid right upper extremity paresis, 3/5 right lower extremity, right-sided hemiparetic and stiff gait, response time is reduced in the right foot DTRs +3 on the right. Assessment/Plan: STEFANIE with a central component, continues to have a great looking download on current ASV settings, which he will continue. I commended him on his compliance. We will continue current treatment. He will need a follow-up 6 months from now.   I counseled him about ongoing blood pressure control and the goals of therapy, he has been quite challenging and he does not have any obvious secondary reason to have this uncontrolled hypertension like suggestion of an adrenal tumor or renal artery stenosis. He will continue to work with his PCP on this. I also counseled him about his left MCA distribution stroke a year ago secondary to uncontrolled hypertension. He has been almost 1year. I explained that he is almost certain that he will remain with the deficits that he has at this point. I discussed therapy, interventions to adapt, which she has been exploring. Counseled him about stroke risk factor control particularly the hypertension which is aforementioned. He will need a follow-up in 6 months for his obstructive sleep apnea. He may follow-up with his PCP for his other issues. PLEASE NOTE:   Portions of this document may have been produced using voice recognition software. Unrecognized errors in transcription may be present. This note will not be viewable in [address]

## 2020-09-30 ENCOUNTER — TELEPHONE (OUTPATIENT)
Dept: NEUROLOGY | Age: 65
End: 2020-09-30

## 2020-09-30 NOTE — TELEPHONE ENCOUNTER
Spoke with octavio, with [employer] after receiving a call regarding patient being able to return to work. Informed their office patient will need follow up visit with current provider. Patient's employer states they have received DMV papers that patient has brought in their office. Employer awaits return of patient to their office after follow up.

## 2020-10-20 ENCOUNTER — OFFICE VISIT (OUTPATIENT)
Dept: NEUROLOGY | Age: 65
End: 2020-10-20
Payer: MEDICARE

## 2020-10-20 VITALS
RESPIRATION RATE: 22 BRPM | WEIGHT: 205 LBS | HEIGHT: 69 IN | DIASTOLIC BLOOD PRESSURE: 79 MMHG | SYSTOLIC BLOOD PRESSURE: 162 MMHG | TEMPERATURE: 98 F | BODY MASS INDEX: 30.36 KG/M2 | OXYGEN SATURATION: 97 % | HEART RATE: 59 BPM

## 2020-10-20 DIAGNOSIS — G81.91 RIGHT HEMIPARESIS (HCC): Primary | ICD-10-CM

## 2020-10-20 DIAGNOSIS — I63.9 INFARCTION OF LEFT BASAL GANGLIA (HCC): ICD-10-CM

## 2020-10-20 PROCEDURE — G0463 HOSPITAL OUTPT CLINIC VISIT: HCPCS | Performed by: STUDENT IN AN ORGANIZED HEALTH CARE EDUCATION/TRAINING PROGRAM

## 2020-10-20 PROCEDURE — 99213 OFFICE O/P EST LOW 20 MIN: CPT | Performed by: STUDENT IN AN ORGANIZED HEALTH CARE EDUCATION/TRAINING PROGRAM

## 2020-10-20 NOTE — PROGRESS NOTES
Lam Dickerson is a 76 y.o. male . presents for Stroke   . 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 Patient is currently on aspirin 81 mg p.o. per day and atorvastatin 80 mg p.o. per day. Continue to have the right upper extremity weakness: He is able to lift it from the shoulder but he has marked difficulty moving the fingers. He is able to walk with a cane. He is normally left-handed. His speech is good but claims he has mild slurring. No difficulty swallowing or choking spells. Denied any numbness. No visual difficulty. He has started driving and today he came for clearance of both back to work. He has a 's test and was cleared for driving and he has his DMV license. Patient claims that he is left-handed and uses the left hand for the wheel. Denied having any difficulty driving currently. 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 Review of Systems   Constitutional: Negative for chills and fever. HENT: Negative for hearing loss and tinnitus. Eyes: Negative for blurred vision and double vision. Respiratory: Negative for cough and shortness of breath. Cardiovascular: Negative for chest pain and leg swelling. Gastrointestinal: Negative for nausea and vomiting. Genitourinary: Negative for dysuria, frequency and urgency. Musculoskeletal: Negative for back pain. Skin: Negative for itching and rash. Neurological: Positive for speech change (mild slurring) and focal weakness (righgt hemiparesis). Negative for dizziness, tingling and tremors. Endo/Heme/Allergies: Bruises/bleeds easily.        Past Medical History:   Diagnosis Date    Acute ischemic stroke (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     On Allopurinol    Chronic hypokalemia     Persistent chronic hypokalemia + hypertension; ?primary hyperaldosteronism    CKD (chronic kidney disease) stage 2, GFR 60-89 ml/min 8/15/2019    Current use of aspirin 8/14/2019    Dysarthria 8/12/2019    Dysphagia 8/12/2019    Elevated prostate specific antigen (PSA) 7/21/2011    First degree atrioventricular block by electrocardiogram 8/12/2019    Hypertensive heart and kidney disease without heart failure and with stage 2 chronic kidney disease     Iron deficiency anemia 8/14/2019    Anemia work-up (8/14/2019) showed serum iron 22, TIBC 371, iron % saturation 6, ferritin 34, reticulocyte count 1.4     Obesity, Class I, BMI 30-34.9     Obstructive sleep apnea on CPAP     On statin therapy due to risk of future cardiovascular event 8/14/2019    On Atorvastatin    Primary osteoarthritis of right ankle 8/22/2019    X-ray of the right ankle (8/22/2019) showed no acute fracture or subluxation; advanced degenerative changes at the tibiotalar joint; old fracture fragment vs. accessory ossicle in the inferior aspect of the lateral malleolus; soft tissue swelling around the ankle.  Pure hypercholesterolemia 08/15/2019    Lipid profile (8/13/2019) showed , , HDL 42, ; Lipid profile (8/14/2019) showed , , HDL 39, LDL 94    Received intravenous tissue plasminogen activator (tPA) in emergency department 8/12/2019    Refusal of statin medication by patient 8/13/2019    As per note of Neurology (Dr. Madeline Kaur), patient refuses statin agent because of potential side effects.      Right hemiparesis ([facility] [address] 8/12/2019    Secondary hyperparathyroidism of renal origin ([facility] [address] 8/18/2019    PTH (8/18/2019) = 101.7    Type 2 diabetes mellitus with stage 2 chronic kidney disease (HCC)     HbA1c (8/13/2019) = 6.6    Vitamin B12 deficiency anemia 8/14/2019    Vitamin B12 (8/14/2019) = 208    Vitamin D deficiency 8/19/2019    Vitamin D 25-Hydroxy (8/19/2019) = 16.2        Past Surgical History:   Procedure Laterality Date    COLONOSCOPY N/A 4/15/2019    COLON[facility] Ramsey Quach [facility] ENDOSCOPY    HX TONSILLECTOMY          Family History   Problem Relation Age of Onset    Cancer Mother         Gastric cancer    Heart Disease Father     Prostate Cancer Father         Social History     Socioeconomic History    Marital status: UNKNOWN     Spouse name: Not on file    Number of children: Not on file    Years of education: Not on file    Highest education level: Not on file   Occupational History    Not on file   Social Needs    Financial resource strain: Not on file    Food insecurity     Worry: Not on file     Inability: Not on file    Transportation needs     Medical: Not on file     Non-medical: Not on file   Tobacco Use    Smoking status: Former Smoker    Smokeless tobacco: Never Used   Substance and Sexual Activity    Alcohol use: No     Alcohol/week: 0.0 standard drinks    Drug use: No    Sexual activity: Not on file   Lifestyle    Physical activity     Days per week: Not on file     Minutes per session: Not on file    Stress: Not on file   Relationships    Social connections     Talks on phone: Not on file     Gets together: Not on file     Attends Zoroastrian service: Not on file     Active member of club or organization: Not on file     Attends meetings of clubs or organizations: Not on file     Relationship status: Not on file    Intimate partner violence     Fear of current or ex partner: Not on file     Emotionally abused: Not on file     Physically abused: Not on file     Forced sexual activity: Not on file   Other Topics Concern    Not on file   Social History Narrative    Not on file        No Known Allergies      Current Outpatient Medications   Medication Sig Dispense Refill    olmesartan (BENICAR) 40 mg tablet Take 40 mg by mouth daily.  calcium-vitamin D (OYSTER SHELL) 500 mg(1,250mg) -200 unit per tablet Take 1 Tab by mouth.  hydroCHLOROthiazide (HYDRODIURIL) 25 mg tablet Take 25 mg by mouth.  hydrALAZINE (APRESOLINE) 25 mg tablet Take 25 mg by mouth three (3) times daily.  finasteride (PROSCAR) 5 mg tablet Take 1 Tab by mouth daily. 90 Tab 4    baclofen (LIORESAL) 10 mg tablet Take 10 mg by mouth.  sertraline (ZOLOFT) 50 mg tablet       carvediloL (COREG) 12.5 mg tablet Take 12.5 mg by mouth.  ascorbic acid, vitamin C, (VITAMIN C) 250 mg tablet Take 1 Tab by mouth daily (with breakfast). 30 Tab 0    ferrous sulfate 325 mg (65 mg iron) tablet Take 1 Tab by mouth daily (with breakfast). 30 Tab 0    aspirin 81 mg chewable tablet Take 1 Tab by mouth daily (with breakfast). Indications: stroke prevention 30 Tab 0    atorvastatin (LIPITOR) 80 mg tablet Take 1 Tab by mouth nightly. Indications: excessive fat in the blood, stroke prevention 30 Tab 0    cholecalciferol (VITAMIN D3) 5,000 unit capsule Take 1 Cap by mouth daily. Indications: vitamin D deficiency 30 Cap 0    amLODIPine (NORVASC) 10 mg tablet Take 10 mg by mouth daily.  glucose blood VI test strips (ASCENSIA AUTODISC VI, ONE TOUCH ULTRA TEST VI) strip Use one strip with glucometer daily in AM before breakfast and PRN (E11.21)      lancets misc Prick finger with lancet once a day in AM before breakfast or PRN to monitor blood sugar (E11.21)      hydrALAZINE (APRESOLINE) 25 mg tablet Take 3 Tabs by mouth every eight (8) hours. Indications: high blood pressure 270 Tab 0         Physical Exam  Constitutional:       Appearance: Normal appearance. HENT:      Head: Atraumatic. Mouth/Throat:      Mouth: Mucous membranes are moist.      Pharynx: Oropharynx is clear. No oropharyngeal exudate. Eyes:      Extraocular Movements: Extraocular movements intact. Pupils: Pupils are equal, round, and reactive to light.    Neck:      Musculoskeletal: Normal range of motion and neck supple. Pulmonary:      Effort: Pulmonary effort is normal. No respiratory distress. Musculoskeletal:      Comments: Right hemiparesis (upper extremity more than the lower extremity)   Neurological:      Mental Status: He is alert. Comments: Mental status: Awake, alert, oriented x3, follows simple and complex commands, no neglect, no extinction to DSS or VSS. Speech and languge: fluent with no significant dysarthria, coherent,  and comprehension intact  CN: VFF, EOMI, PERRLA, face sensation intact , mild right lower facial asymmetry noted, palate elevation symmetric bilat, difficulty lifting the right shoulder up;  tongue midline  Motor: Slightly increased tone over the right upper and lower extremities; strength is over the right hand is 1-2/5; elbow to 3/5 and shoulder abduction is 3 out of 5; right lower extremity hip flexion 4+/5; knee extension 4+/5; knee flexion 4 -/5; ankle dorsiflexion and plantar flexion 3/5. Strength is over the left side is normal.    Coordination: FNF, HS accurate w/o dysmetria on the left side; unable to test on the left. DTR: 2+ on the  left side and 3+ on the right side. Gait: Hemiparetic. No visits with results within 3 Month(s) from this visit. Latest known visit with results is:   Hospital Outpatient Visit on 06/18/2020   Component Date Value Ref Range Status    Total volume 06/18/2020 930  mL Corrected    CORRECTED ON 06/18 AT 1335: PREVIOUSLY REPORTED AS UNKN    Period of collection 06/18/2020 24 Hr Urine  hr Corrected    CORRECTED ON 06/18 AT 1335: PREVIOUSLY REPORTED AS 24HR UA    Normetanephrine urine 06/18/2020 120  Undefined ug/L Final    Comment: (NOTE)  This test was developed and its performance characteristics  determined by [email or web address]. It has not been cleared or approved  by the Food and Drug Administration.       Normetanephrine urine, 24 hr 06/18/2020 1* 156 - 729 ug/24 hr Final    Comment: (NOTE) **Please note reference interval change**      Metanephrine urine 06/18/2020 82  Undefined ug/L Final    Comment: (NOTE)  This test was developed and its performance characteristics  determined by [facility]. It has not been cleared or approved  by the Food and Drug Administration.  Metanephrine urine, 24 hr 06/18/2020 0* 58 - 276 ug/24 hr Final    Comment: (NOTE)               **Please note reference interval change**  Performed At: [address] [id number]  Anh Chiang MD [id number]               ICD-10-CM ICD-9-CM    1. Right hemiparesis (HCC)  G81.91 342.90    2. Infarction of left basal ganglia [facility])  I63.9 434.91      A 39-year-old male patient with history of left basal ganglia lacunar stroke in August 2019 here for follow-up evaluation. Patient is currently ambulatory with a cane. He has already started draining. He recently has a 's test and has got clearance for driving. He still has significant weakness on the right upper extremity; patient uses his left upper extremity for the steering wheel. His strength is over the right lower extremity appears better. Normal strength on the left side. He will continue with aspirin and atorvastatin. Advised to follow closely with his primary care provider. Will be going back to his work. We will see him in 6 months time.

## 2020-10-20 NOTE — LETTER
NOTIFICATION RETURN TO WORK / SCHOOL 
 
10/20/2020 10:51 AM 
 
Mr. Darryl Rodriguez [address] [facility] [id number] To Whom It May Concern: 
 
Darryl Rodriguez is currently under the care of Halle Butterfield. He will return to work/school. 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 If there are questions or concerns please have the patient contact our office. Sincerely, Diego Carreno MD

## 2020-10-20 NOTE — LETTER
NOTIFICATION RETURN TO WORK 
 
10/20/2020 10:56 AM 
 
Mr. Darryl Rodriguez [address] [facility] [id number] To Whom It May Concern: 
 
Darryl Rodriguez is currently under the care of Halle Butterfield. He is currently stable and will return to work. 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 If there are questions or concerns please have the patient contact our office. Sincerely, Abel Landaverde MD

## 2021-04-16 NOTE — PROGRESS NOTES
"Chief Complaint  Allergies    Subjective          Michael Issa presents to [facility] for allergies.   History of Present Illness  Allergies  Chronic. Seasonally recurrent. Reports congestion, rhinorrhea, eye swelling/itching since mowing 2 days ago. Has tried otc allegra d, flonase and patanol without any improvement. No issues with breathing, wheezing, chest tightness, throat. Would like a steroid shot today, reports this always helps his symptoms. Typically has to get one in spring and fall for his allergies.       Objective   Vital Signs:   /88 (BP Location: Left arm, Patient Position: Sitting, Cuff Size: Adult)   Pulse 68   Temp 98.6 °F (37 °C) (Infrared)   Resp 16   Ht 185.4 cm (73\") Comment: per patient  Wt 99.8 kg (220 lb) Comment: per patient  SpO2 98%   BMI 29.03 kg/m²     Physical Exam  Vitals and nursing note reviewed.   Constitutional:       General: He is not in acute distress.     Appearance: He is well-developed.   HENT:      Head: Normocephalic and atraumatic.      Right Ear: Tympanic membrane and ear canal normal.      Left Ear: Tympanic membrane and ear canal normal.      Nose: Mucosal edema, congestion and rhinorrhea present.      Right Sinus: No maxillary sinus tenderness or frontal sinus tenderness.      Left Sinus: No maxillary sinus tenderness or frontal sinus tenderness.      Mouth/Throat:      Pharynx: Uvula midline. Oropharyngeal exudate (francisco pnd) present. No uvula swelling.   Eyes:      General: Lids are normal. Vision grossly intact.      Extraocular Movements: Extraocular movements intact.      Conjunctiva/sclera:      Right eye: Right conjunctiva is injected.      Left eye: Left conjunctiva is injected.      Comments: Eye watering noted both eyes   Neck:      Thyroid: No thyromegaly.      Trachea: No tracheal deviation.   Cardiovascular:      Rate and Rhythm: Normal rate and regular rhythm.      Heart sounds: Normal heart sounds. " PT DAILY TREATMENT NOTE 10-18    Patient Name: Compa Ivy  Date:2019  : 1955  [x]  Patient  Verified  [place]or: [place] MED[place]ARE / Plan: [place] MEDICARE PART A & B / Product Type: Medicare /    In time:1200  Out time:1231  Total Treatment Time (min): 31  Visit #: 12 of 24    Medicare/BCBS Only   Total Timed Codes (min):  31 1:1 Treatment Time:  23       Treatment Area: Lower extremity weakness [R29.898]  Cerebral infarction, unspecified [I63.9]    SUBJECTIVE  Pain Level (0-10 scale): 0/10  Any medication changes, allergies to medications, adverse drug reactions, diagnosis change, or new procedure performed?: [x] No    [] Yes (see summary sheet for update)  Subjective functional status/changes:   [] No changes reported  Pt request to work on his walking endurance  As well. OBJECTIVE    31 min Therapeutic Exercise:  [] See flow sheet :   Rationale: increase ROM and increase strength to improve the patients ability to ease with ADL's        With   [] TE   [] TA   [] neuro   [] other: Patient Education: [x] Review HEP    [] Progressed/Changed HEP based on:   [] positioning   [] body mechanics   [] transfers   [] heat/ice application    [] other:      Other Objective/Functional Measures: ambulated with [facility] with supervision. Right quad=4/5, HS=4-/5, Abd=4/5, hip flexor=4/5  Sidestepping in parallel bars with cues to keep his feet facing forward. Pain Level (0-10 scale) post treatment: 0/10    ASSESSMENT/Changes in Function: Progressing well. He is able to dress himself including putting on his AFO, but needs assist to put on deodorant and reaching across his body.     Patient will continue to benefit from skilled PT services to modify and progress therapeutic interventions, address functional mobility deficits, address ROM deficits, address strength deficits, analyze and address soft tissue restrictions, analyze and cue movement patterns, analyze and modify body mechanics/ergonomics, assess and modify   Pulmonary:      Effort: Pulmonary effort is normal.      Breath sounds: Normal breath sounds.   Musculoskeletal:      Cervical back: Neck supple.   Lymphadenopathy:      Cervical: No cervical adenopathy.   Skin:     General: Skin is warm and dry.   Neurological:      Mental Status: He is alert.   Psychiatric:         Behavior: Behavior normal.        Result Review :                 Assessment and Plan    Diagnoses and all orders for this visit:    1. Chronic seasonal allergic rhinitis due to pollen (Primary)  -     dexamethasone (DECADRON) injection 10 mg      Continue all other supportive treatment  If not improved in a few days may need steroid pack, will let us know        Follow Up   Return in about 1 week (around 4/9/2021), or if symptoms worsen or fail to improve.  Patient was given instructions and counseling regarding his condition or for health maintenance advice. Please see specific information pulled into the AVS if appropriate.        postural abnormalities and address imbalance/dizziness to attain remaining goals. [x]  See Plan of Care  []  See progress note/recertification  []  See Discharge Summary         Progress towards goals / Updated goals:  1.  Pt will ambulate 4 steps x 3 with 1 HR with supervision to be able to navigate stairs at home.                2. Pt will increase FOTO score by 7 pts to improve LE function.                3. Pt will increase right LE hip, quad and HS strength to 4/5 or greater to be able to navigate community distances.                 4. Pt will demonstrate Romberg EO/EC on a compliant surface to decrease fall risk during ADL's.                5. Pt will ambulate with LRAD >150 ft including an obstacle course to be able to navigate short community distances safely.        PLAN  [x]  Upgrade activities as tolerated     [x]  Continue plan of care  []  Update interventions per flow sheet       []  Discharge due to:_  []  Other:_      Michelle Guerrero, PT 11/25/2019  11:52 AM    Future Appointments   Date Time Provider Marc Ruvalcaba   11/25/2019 12:00 PM Stacey Novoa, PT MMCPTPB [facility]   11/25/2019  1:00 PM Rae Buitrago OTR/L [facility]CPTP[facility]   11/25/2019  1:45 PM Genesis Galicia, SILVERIO MMCPTPB [facility]   11/26/2019  1:45 PM SILVERIO Chopra MMCPTPB [facility]   11/26/2019  2:45 PM Cynthia Abbott [id number] [facility]   11/27/2019  3:00 PM Onita Null, PTA MMCPTP[facility]   11/27/2019  4:00 PM Crystal Scar AIUMRTB [facility]   12/2/2019  1:00 PM Onita Null, PTA MMCPTP[facility]   12/2/2019  1:45 PM Genesis Galicia SLP MMCPTPB [facility]   12/2/2019  2:30 PM Rae Biutrago, OTR/L [facility]CPTP[facility]   12/4/2019 10:30 AM Onita Null, PTA [facility]CP[facility]   12/4/2019 11:00 AM Crystal Papa [id number] [facility]   12/4/2019 12:00 PM SILVERIO Chopra MMCPTPB [facility]   12/6/2019  1:15 PM Cynthia Papa [id number] [facility]   12/6/2019  2:00 PM SILVERIO Chopra MMCPTPB [facility]   12/9/2019  9:45 AM SILVERIO Chopra MMCPTPB [facility]   12/9/2019 10:30 AM Junie Yeung, PT IHCRHDA [facility]   12/9/2019 11:00 AM Christiano Backer [id number] [facility]   12/11/2019  9:30 AM Odella Jordan, PTA MMCPTPB [facility]   12/11/2019 10:00 AM Justa Linda I SLP [id number] [facility]   12/11/2019 11:00 AM Morris Elizabeth, OTR/L MMCPTPB [facility]   12/13/2019  9:45 AM SILVERIO Gould [id number] [facility]   12/13/2019 10:30 AM Morris Elizabeth, OTR/L [facility]CPTP[facility]   12/16/2019 12:00 PM Junie Yeung, PT MMCPTPB [facility]   12/16/2019  1:00 PM Seymourne Backer [id number] [facility]   12/16/2019  1:45 PM SILVERIO Gould [id number] [facility]   12/18/2019  2:00 PM Komal Ortega, PTA MMCPTPB [facility]   12/18/2019  2:45 PM SILVERIO Gould [id number] [facility]   12/18/2019  3:30 PM Seymourne Backer [id number] [facility]   12/20/2019  1:00 PM SILVERIO Gould OUPUIFN [facility]   12/20/2019  2:00 PM Seymourne Backer [id number] [facility]   12/23/2019  9:00 AM Komal Ortega, PTA MMCPTPB [facility]   12/23/2019  9:30 AM Christiano Backer [id number] [facility]   12/23/2019 10:30 AM [place] I, SLP [id number] [facility]   12/24/2019  9:00 AM Komal Ortega, PTA MMCPTPB [facility]   12/24/2019  9:30 AM Christiano Cabrera DMPZARM [facility]   12/24/2019 10:15 AM Neville Hernandez, SLP [id number] [facility]   12/27/2019 10:30 AM Morris Elizabeth, OTR/L MMCPTPB [facility]   12/27/2019 11:15 AM Wong Fountain, SLP MMCPTPB [facility]   12/30/2019  9:45 AM SILVERIO Gould [id number] [facility]   12/30/2019 10:30 AM Komal Ortega, PTA MMCPTPB [facility]   12/30/2019 11:00 AM Morris Elizabeth, OTR/L MMCPTPB [facility]   12/31/2019  9:45 AM Morris Elizabeth, OTR/L MMCPTPB [facility]   12/31/2019 10:30 AM Komal Ortega PTA MMCPTPB [facility]   12/31/2019 11:30 AM [place]asant [place] I, SLP [id number] [facility]   1/2/2020  9:45 AM Seymourne Backer [id number] [facility]   1/2/2020 10:30 AM Pleasant [place] I, SLP MMCPTPB [facility]   1/30/2020  8:30 PM [facility] SLEEP RM 4 MMCSL [facility]   2/10/2020  8:30 AM Marisabel Tamayo MD [address]   2/14/2020 10:15 AM Von Van[place] MD [place]

## 2021-05-28 ENCOUNTER — OFFICE VISIT (OUTPATIENT)
Dept: NEUROLOGY | Age: 66
End: 2021-05-28
Payer: MEDICARE

## 2021-05-28 VITALS
WEIGHT: 219 LBS | SYSTOLIC BLOOD PRESSURE: 144 MMHG | TEMPERATURE: 97.9 F | DIASTOLIC BLOOD PRESSURE: 66 MMHG | RESPIRATION RATE: 22 BRPM | OXYGEN SATURATION: 97 % | BODY MASS INDEX: 32.44 KG/M2 | HEART RATE: 71 BPM | HEIGHT: 69 IN

## 2021-05-28 DIAGNOSIS — G81.91 RIGHT HEMIPARESIS (HCC): Primary | ICD-10-CM

## 2021-05-28 DIAGNOSIS — I63.9 INFARCTION OF LEFT BASAL GANGLIA (HCC): ICD-10-CM

## 2021-05-28 PROCEDURE — 3017F COLORECTAL CA SCREEN DOC REV: CPT | Performed by: STUDENT IN AN ORGANIZED HEALTH CARE EDUCATION/TRAINING PROGRAM

## 2021-05-28 PROCEDURE — G0463 HOSPITAL OUTPT CLINIC VISIT: HCPCS | Performed by: STUDENT IN AN ORGANIZED HEALTH CARE EDUCATION/TRAINING PROGRAM

## 2021-05-28 PROCEDURE — 1100F PTFALLS ASSESS-DOCD GE2>/YR: CPT | Performed by: STUDENT IN AN ORGANIZED HEALTH CARE EDUCATION/TRAINING PROGRAM

## 2021-05-28 PROCEDURE — G8417 CALC BMI ABV UP PARAM F/U: HCPCS | Performed by: STUDENT IN AN ORGANIZED HEALTH CARE EDUCATION/TRAINING PROGRAM

## 2021-05-28 PROCEDURE — G8427 DOCREV CUR MEDS BY ELIG CLIN: HCPCS | Performed by: STUDENT IN AN ORGANIZED HEALTH CARE EDUCATION/TRAINING PROGRAM

## 2021-05-28 PROCEDURE — G8432 DEP SCR NOT DOC, RNG: HCPCS | Performed by: STUDENT IN AN ORGANIZED HEALTH CARE EDUCATION/TRAINING PROGRAM

## 2021-05-28 PROCEDURE — G8536 NO DOC ELDER MAL SCRN: HCPCS | Performed by: STUDENT IN AN ORGANIZED HEALTH CARE EDUCATION/TRAINING PROGRAM

## 2021-05-28 PROCEDURE — 99213 OFFICE O/P EST LOW 20 MIN: CPT | Performed by: STUDENT IN AN ORGANIZED HEALTH CARE EDUCATION/TRAINING PROGRAM

## 2021-05-28 PROCEDURE — 3288F FALL RISK ASSESSMENT DOCD: CPT | Performed by: STUDENT IN AN ORGANIZED HEALTH CARE EDUCATION/TRAINING PROGRAM

## 2021-05-28 RX ORDER — TIZANIDINE 4 MG/1
TABLET ORAL
COMMUNITY
Start: 2021-03-04

## 2021-05-28 RX ORDER — POTASSIUM CHLORIDE 1500 MG/1
TABLET, EXTENDED RELEASE ORAL
COMMUNITY
Start: 2021-02-27

## 2021-05-28 RX ORDER — SPIRONOLACTONE 25 MG/1
TABLET ORAL
COMMUNITY
Start: 2021-03-18

## 2021-05-28 NOTE — PROGRESS NOTES
Tom Encinas is a 76 y.o. male . presents for Follow-up (right hemiparesis ) and Form Completion (DMV)   . 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 Last seen in the clinic in October 2020. He came today to update his DMV forms. Since his last visit, he claims he is progressively getting better. He is able to walk with with a cane. He He has no falls. Also claims he has more movement over the right upper extremity  But he still has significant difficulty using it. Has some stiffness. Mild speech difficulty. No swallowing difficulty. No choking spells. No sensory symptoms over the right side. He takes Zanaflex for muscle stiffness. He continues to use his aspirin and atorvastatin. He already has started driving. 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 Follow-up  Pertinent negatives include no chest pain, no headaches and no shortness of breath. Review of Systems   Constitutional: Negative for chills and fever. HENT: Negative for hearing loss and tinnitus. Eyes: Negative for blurred vision and double vision. Respiratory: Negative for cough and shortness of breath. Cardiovascular: Negative for chest pain and leg swelling. Gastrointestinal: Negative for nausea and vomiting. Genitourinary: Negative for dysuria, frequency and urgency. Musculoskeletal: Negative for back pain and neck pain. Skin: Positive for itching. Negative for rash. Neurological: Positive for speech change (mild slurring) and focal weakness (righgt hemiparesis). Negative for dizziness, tingling, tremors and headaches. Endo/Heme/Allergies: Bruises/bleeds easily. Psychiatric/Behavioral: Negative for depression. The patient is not nervous/anxious.         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     On Allopurinol    Chronic hypokalemia     Persistent chronic hypokalemia + hypertension; ?primary hyperaldosteronism    CKD (chronic kidney disease) stage 2, GFR 60-89 ml/min 8/15/2019    Current use of aspirin 8/14/2019    Dysarthria 8/12/2019    Dysphagia 8/12/2019    Elevated prostate specific antigen (PSA) 7/21/2011    First degree atrioventricular block by electrocardiogram 8/12/2019    Hypertensive heart and kidney disease without heart failure and with stage 2 chronic kidney disease     Iron deficiency anemia 8/14/2019    Anemia work-up (8/14/2019) showed serum iron 22, TIBC 371, iron % saturation 6, ferritin 34, reticulocyte count 1.4     Obesity, Class I, BMI 30-34.9     Obstructive sleep apnea on CPAP     On statin therapy due to risk of future cardiovascular event 8/14/2019    On Atorvastatin    Primary osteoarthritis of right ankle 8/22/2019    X-ray of the right ankle (8/22/2019) showed no acute fracture or subluxation; advanced degenerative changes at the tibiotalar joint; old fracture fragment vs. accessory ossicle in the inferior aspect of the lateral malleolus; soft tissue swelling around the ankle.  Pure hypercholesterolemia 08/15/2019    Lipid profile (8/13/2019) showed , , HDL 42, ; Lipid profile (8/14/2019) showed , , HDL 39, LDL 94    Received intravenous tissue plasminogen activator (tPA) in emergency department 8/12/2019    Refusal of statin medication by patient 8/13/2019    As per note of Neurology (Dr. Elena Amezcua), patient refuses statin agent because of potential side effects.      Right hemiparesis ([address] 8/12/2019    Secondary hyperparathyroidism of renal origin ([address] 8/18/2019    PTH (8/18/2019) = 101.7    Type 2 diabetes mellitus with stage 2 chronic kidney disease ([facility] [address]     HbA1c (8/13/2019) = 6.6    Vitamin B12 deficiency anemia 8/14/2019    Vitamin B12 (8/14/2019) = 208    Vitamin D deficiency 8/19/2019    Vitamin D 25-Hydroxy (8/19/2019) = 16.2        Past Surgical History:   Procedure Laterality Date    COLONOSCOPY N/A 4/15/2019    COLONOSCOPY performed by Tacos Irizarry MD at [facility] ENDOSCOPY    HX TONSILLECTOMY          Family History   Problem Relation Age of Onset    Cancer Mother         Gastric cancer    Heart Disease Father     Prostate Cancer Father         Social History     Socioeconomic History    Marital status: UNKNOWN     Spouse name: Not on file    Number of children: Not on file    Years of education: Not on file    Highest education level: Not on file   Occupational History    Not on file   Tobacco Use    Smoking status: Former Smoker    Smokeless tobacco: Never Used   Substance and Sexual Activity    Alcohol use: No     Alcohol/week: 0.0 standard drinks    Drug use: No    Sexual activity: Not on file   Other Topics Concern    Not on file   Social History Narrative    Not on file     Social Determinants of Health     Financial Resource Strain:     Difficulty of Paying Living Expenses:    Food Insecurity:     Worried About Running Out of Food in the Last Year:     Ran Out of Food in the Last Year:    Transportation Needs:     Lack of Transportation (Medical):      Lack of Transportation (Non-Medical):    Physical Activity:     Days of Exercise per Week:     Minutes of Exercise per Session:    Stress:     Feeling of Stress :    Social Connections:     Frequency of Communication with Friends and Family:     Frequency of Social Gatherings with Friends and Family:     Attends [employer] Services:     Active Member of Clubs or Organizations:     Attends Club or Organization Meetings:     Marital Status:    Intimate Partner Violence:     Fear of Current or Ex-Partner:     Emotionally Abused:     Physically Abused:     Sexually Abused:         No Known Allergies      Current Outpatient Medications   Medication Sig Dispense Refill    MULTIVITAMIN PO Take 1 Tablet by mouth daily.  tiZANidine (ZANAFLEX) 4 mg tablet TAKE 1 TABLET BY MOUTH EVERYDAY AT BEDTIME      spironolactone (ALDACTONE) 25 mg tablet TAKE 1 TABLET BY MOUTH TWICE A DAY      Klor-Con M20 20 mEq tablet TAKE 1 TABLET BY MOUTH ONCE A DAY. DO NOT CRUSH OR CHEW.  finasteride (PROSCAR) 5 mg tablet Take 1 Tab by mouth daily for 116 days. 116 Tab 0    olmesartan (BENICAR) 40 mg tablet Take 40 mg by mouth daily.  hydroCHLOROthiazide (HYDRODIURIL) 25 mg tablet Take 25 mg by mouth.  sertraline (ZOLOFT) 50 mg tablet       carvediloL (COREG) 12.5 mg tablet Take 12.5 mg by mouth.  ferrous sulfate 325 mg (65 mg iron) tablet Take 1 Tab by mouth daily (with breakfast). 30 Tab 0    hydrALAZINE (APRESOLINE) 25 mg tablet Take 3 Tabs by mouth every eight (8) hours. Indications: high blood pressure 270 Tab 0    aspirin 81 mg chewable tablet Take 1 Tab by mouth daily (with breakfast). Indications: stroke prevention 30 Tab 0    atorvastatin (LIPITOR) 80 mg tablet Take 1 Tab by mouth nightly. Indications: excessive fat in the blood, stroke prevention 30 Tab 0    amLODIPine (NORVASC) 10 mg tablet Take 10 mg by mouth daily.  glucose blood VI test strips (ASCENSIA AUTODISC VI, ONE TOUCH ULTRA TEST VI) strip Use one strip with glucometer daily in AM before breakfast and PRN (E11.21) (Patient not taking: Reported on 5/28/2021)      lancets misc Prick finger with lancet once a day in AM before breakfast or PRN to monitor blood sugar (E11.21) (Patient not taking: Reported on 5/28/2021)           Physical Exam  Constitutional:       Appearance: Normal appearance. HENT:      Head: Atraumatic. Mouth/Throat:      Mouth: Mucous membranes are moist.      Pharynx: Oropharynx is clear. No oropharyngeal exudate.    Eyes:      Extraocular Movements: Extraocular movements intact. Pupils: Pupils are equal, round, and reactive to light. Pulmonary:      Effort: Pulmonary effort is normal. No respiratory distress. Musculoskeletal:      Cervical back: Normal range of motion and neck supple. Comments: Right hemiparesis (upper extremity more than the lower extremity)   Neurological:      Mental Status: He is alert. Comments: Mental status: Awake, alert, oriented x3, follows simple and complex commands, no neglect, no extinction to DSS or VSS. Speech and languge: fluent with no significant dysarthria, coherent,  and comprehension intact  CN: VFF, EOMI, PERRLA, face sensation intact , mild right lower facial asymmetry noted, palate elevation symmetric bilat, difficulty lifting the right shoulder up;  tongue midline  Motor: Slightly increased tone over the right upper and lower extremities; strength over the right hand is 2/5; elbow to 3/5 and shoulder abduction is 3 out of 5; right lower extremity hip flexion 4+/5; knee extension 4+/5; knee flexion 4 /5; ankle dorsiflexion and plantar flexion 3/5. Strength is over the left side is normal.    Coordination: FNF, HS accurate w/o dysmetria on the left side; unable to test on the left. DTR: 2+ on the  left side and 3+ on the right side. Gait: Hemiparetic. No visits with results within 3 Month(s) from this visit.    Latest known visit with results is:   Hospital Outpatient Visit on 06/18/2020   Component Date Value Ref Range Status    Total volume 06/18/2020 930  mL Corrected    CORRECTED ON 06/18 AT 1335: PREVIOUSLY REPORTED AS UNKN    Period of collection 06/18/2020 24 Hr Urine  hr Corrected    CORRECTED ON 06/18 AT 1335: PREVIOUSLY REPORTED AS 24HR UA    Normetanephrine urine 06/18/2020 120  Undefined ug/L Final    Comment: (NOTE)  This test was developed and its performance characteristics  determined by [facility]. It has not been cleared or approved  by the Food and Drug Administration.  Normetanephrine urine, 24 hr 06/18/2020 1* 156 - 729 ug/24 hr Final    Comment: (NOTE)               **Please note reference interval change**      Metanephrine urine 06/18/2020 82  Undefined ug/L Final    Comment: (NOTE)  This test was developed and its performance characteristics  determined by [facility]. It has not been cleared or approved  by the Food and Drug Administration.  Metanephrine urine, 24 hr 06/18/2020 0* 58 - 276 ug/24 hr Final    Comment: (NOTE)               **Please note reference interval change**  Performed At: [address] [id number]  John Carter MD BZ:[id number]               ICD-10-CM ICD-9-CM    1. Right hemiparesis (HCC)  G81.91 342.90    2. Infarction of left basal ganglia [facility])  I63.9 434.91      A 76-year-old male patient with history of left basal ganglia lacunar stroke in August 2019 here for follow-up evaluation. He continues to progressively improve. 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 No problems so far. Will renew his DMV papers. We will continue follow-up with his primary care provider for risk factor modification. We will continue with aspirin and atorvastatin. We will see him again in 6 months time.

## 2021-05-28 NOTE — LETTER
NOTIFICATION RETURN TO WORK / SCHOOL 
 
5/28/2021 10:42 AM 
 
Mr. Deangelo Arnett [address] [facility] [address] To Whom It May Concern: 
 
Deangelo Arnett is currently under the care of Halle Butterfield. He is currently stable and will return to work on Tuesday, June 1, 2021. 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 
  
If there are questions or concerns please have the patient contact our office. Sincerely, Kassandra Ocasio MD

## 2021-08-04 ENCOUNTER — HOSPITAL ENCOUNTER (OUTPATIENT)
Dept: PHYSICAL THERAPY | Age: 66
Discharge: HOME | End: 2021-08-04
Payer: MEDICARE

## 2021-08-04 PROCEDURE — 97165 OT EVAL LOW COMPLEX 30 MIN: CPT

## 2021-08-04 PROCEDURE — 97110 THERAPEUTIC EXERCISES: CPT

## 2021-08-04 NOTE — PROGRESS NOTES
OT DAILY TREATMENT NOTE     Patient Name: Roseanne Tobar  Date:2021  : 1955  [x]  Patient  Verified  Payor: [place] MEDICARE / Plan: [place] MEDICARE PART A & B / Product Type: Medicare /    In time:900  Out time:945  Total Treatment Time (min): 45  Visit #: 1 of 8    Medicare/BCBS Only   Total Timed Codes (min):  25 1:1 Treatment Time:  45     Treatment Area: Upper extremity weakness [R29.898]  Hemiplegia and hemiparesis following cerebral infarction affecting unspecified side [I69.359]    SUBJECTIVE  Pain Level (0-10 scale): 0/10  Any medication changes, allergies to medications, adverse drug reactions, diagnosis change, or new procedure performed?: [x] No    [] Yes (see summary sheet for update)  Subjective functional status/changes:   [] No changes reported  Doing e stim 2x day for 30 mins    OBJECTIVE    20 min [x]Eval                  []Re-Eval       25 min Therapeutic Exercise:  [] See flow sheet :   Rationale: increase ROM and increase strength to improve the patients ability to flex elbow  Supine elbow flex ext x 10 with hold at end range flexion  Shoulder abd x 10  Supine place and hold shoulder flex x 30 secs  shoulder flex with elbow flex ext 5x 2 sets       With   [x] TE   [] TA   [] neuro   [] other: Patient Education: [x] Review HEP    [] Progressed/Changed HEP based on:  Supine shoulder abd, place and hold with elbow flex and elbow flex ext  [] positioning   [] body mechanics   [] transfers   [] heat/ice application   [] Splint wear/care   [] Sensory re-education   [] scar management      [] other:            Other Objective/Functional Measures:   Subjective: pt is a left hand dominant, 78 y.o.y/o, male who sustained his/her injury 2019 went to [facility], ARU and received outpatient therapy.    Prior level of function: REtired from [employer], walking, collect [employer], driving, mow lawn, [employer] study  Pain level:(0 -no pain 10-debilitating pain) no      Current functional limitations/living situation: With wife    Medical hx: HTN, arthritis    Medications: See the written copy of this report in the patient's paper medical record.        Objective:    Sensation:No changes noted  Range of Motion:     Active      Norms Right Left     Shoulder Flex 0-180 65 WFL      Ext 0-60 40 WFL      abd 0-180 65 WFL      Horizontal add 0-45 WNL WFL      IR 0-70 To hip WFL      ER 0-90  WFL     Elbow Ext/flex 0-150 0-55 WFL     Forearm Supination 0-80 unable WFL      Pronation 0-80 unable [facility]     Wrist Flex 0-80 WFL WFL      Ext 0-70 0 WFL      Ulnar Dev 0-30 unable WFL      Radial Dev 0-20 unable [facility]       Inconsistent active thumb extension ,   Hand ROM Able to actively flex digits, no active extension  Hand Strength:   Gross Grasp 3pt Pinch Lateral Pinch Tip Pinch   Right  10 0 2 0   Left 45 10 10 10       Right=_unable to remove____seconds  Finger Opposition:intact to index  Palpation: ongoing subluxation    ADLs  Feeding:        []MaxA   []ModA   []Luba   [] CGA   []SBA   []Erica   [x]Independent  UE Dressing:       []MaxA   []ModA   [x]Luba   [] CGA   []SBA   []Erica   []Independent  LE Dressing:       []MaxA   []ModA   []Luba   [] CGA   []SBA   []Erica   [x]Independent  Grooming:       []MaxA   []ModA   []Luba   [] CGA   []SBA   []Erica   [x]Independent  Toileting:       []MaxA   []ModA   []Luba   [] CGA   []SBA   []Erica   [x]Independent  Bathing:       []MaxA   []ModA   []Luba   [] CGA   []SBA   []Erica   [x]Independent  Light Meal Prep:    []MaxA   []ModA   [x]Luba   [] CGA   []SBA   []Erica   []Independent  Household/Other:  []MaxA   []ModA   []Luba   [] CGA   []SBA   []Erica   []IndependentDoes not do  Adaptive Equip:     []MaxA   []ModA   []Luba   [] CGA   []SBA   []Erica   []Independent  Driving:       []MaxA   []ModA   []Luba   [] CGA   []SBA   []Erica   [x]Independent  Money Mgmt:        []MaxA   []ModA   []Luba   [] CGA   []SBA   []Erica   [x]Independent    Coordination   GM                           FM []WFL [x] Impaired   []WFL          [x] Impaired    Tone/Motor Control []WFL          [x] Impaired    Midline Symmetry []WFL          [x] Impaired    Visual Perception:                    R/L   Neglect           R/L Discrimination                   Body Scheme [x]WFL          [] Impaired   [x]WFL          [] Impaired     [x]WFL          [] Impaired     [x]WFL          [] Impaired    Visual Motor Skills                           Tracking                           Tracing                            Writing   [x]WFL          [] Impaired     [x]WFL          [] Impaired   [x]WFL          [] Impaired    Cognition []Person         []Place            []Date   []Situation/ Behavior    Follows Commands  []     1-step  [] 2-step   [x]Multi-step      Memory:                             STM                             LTM   [x]WFL          [] Impaired   [x]WFL          [] Impaired    Safety Awareness [x]WFL          [] Impaired    Judgment  [x]WFL          [] Impaired    Express Needs [x]WFL          [] Impaired           Pain Level (0-10 scale) post treatment: 0/10    ASSESSMENT/Changes in Function:    [x]  See Plan of Care  []  See progress note/recertification  []  See Discharge Summary             PLAN  []  Upgrade activities as tolerated     []  Continue plan of care  []  Update interventions per flow sheet       []  Discharge due to:_  []  Other:_      Michael Billy OTR/L 8/4/2021  8:50 AM    Future Appointments   Date Time Provider Marc Ruvalcaba   8/4/2021  9:00 AM Reilly Buck OTR/L [id number]   8/16/2021 11:00 AM Allen Monteiro   8/30/2021  1:00 PM MD Beck Fournier   11/29/2021  8:00 AM Betito Hough MD [facility]

## 2021-08-04 NOTE — PROGRESS NOTES
[facility]  [employer] [facility]  [address] [place] [address]  [phone] fax    Plan of Care/Statement of Necessity for Occupational Therapy Services    Patient name: Ruddy Masetrs Start of Care: 2021   Referral source: Deon Parker NP : 1955    Medical Diagnosis: Upper extremity weakness [R29.898]  Hemiplegia and hemiparesis following cerebral infarction affecting unspecified side [I69.359]  Payor: VA MEDICARE / Plan: VA MEDICARE PART A & B / Product Type: Medicare /  Onset Date:2019    Treatment Diagnosis: WEakness left UE   Prior Hospitalization: see medical history Provider#: [id number]   Medications: Verified on Patient summary List    Comorbidities: HTN, arthritis   Prior Level of Function: REtired from [employer], walking, collect [employer], driving, mow lawn, [employer] study          The Plan of Care and following information is based on the information from the initial evaluation. Assessment/ key information: pt is a left hand dominant, 78 y.o.y/o, male who sustained his/her injury 2019 went to [facility], ARU and received outpatient therapy at this clinic before being discharged due to reaching plateau. Since that time patient has continued ot work on UE exercises and been compliant with use of NMES. He has improved in shoulder motion in flexion and abduction despite ongoing subluxation. He has active elbow flexion and extension and can actively flex digits for grasp. He was able to demonstrate active thumb opposition and extension. He si performing all self care with assist for buttons and avoiding tying shoes. His FOTO is 48/100 reflecting moderate impairment. He will benefit from skilled occupational therapy to improve right UE function.       Evaluation Complexity: History LOW Complexity : Brief history review  Examination LOW Complexity : 1-3 performance deficits relating to physical, cognitive , or psychosocial skils that result in activity limitations and / or participation restrictions  Clinical Decision Making LOW Complexity : No comorbidities that affect functional and no verbal or physical assistance needed to complete eval tasks   Overall Complexity Rating: LOW     Problem List: Decreased range of motion, Decreased strength, Decreased coordination/prehension and Decreased ADL/functional abilities      Treatment Plan may include any combination of the following: Therapeutic exercise, Therapeutic activities, Neuromuscular re-education, Physical agent/modality, Patient education and ADLs/IADLs    Patient / Family readiness to learn indicated by: asking questions, trying to perform skills and interest    Persons(s) to be included in education:   patient (P)    Barriers to Learning/Limitations: None    Patient Goal (s): use of hand    Patient Self Reported Health Status: good    Rehabilitation Potential: good    Short Term Goals: To be accomplished in 2 weeks:  1. Patient will be independent in home exercise program for AROM for elbow shoulder. 2.  Patient will be independent in use of NMES to facilitate better elbow flexion. 3.  Patient will be able to pinch to  items with right hand    Long Term Goals: To be accomplished in 4 weeks:   1. Patient will be able to use right hand as assist in buttoning  2. Patient will be able to flex right elbow to bring hand ot table level to assist with self care tasks  3. Patient will be able to reach with right hand to turn on faucet. 4.  Patient will be able to use right hand to assist in washing  Face as shown by increase in FOTO of at least 5 points.       Frequency / Duration: Patient to be seen 2 times per week for 4 weeks:    Patient/ Caregiver education and instruction: Diagnosis, prognosis, self care, activity modification and exercises  [x]  Plan of care has been reviewed with ANDERSON    Certification Period: 8/4/21-9/2/21    MALLIKA Cardozo/ROVERTO 8/4/2021 8:51 AM      ________________________________________________________________________    I certify that the above Therapy Services are being furnished while the patient is under my care. I agree with the treatment plan and certify that this therapy is necessary.     Physician's Signature:____________Date:_________TIME:________     Steve Platt NP  ** Signature, Date and Time must be completed for valid certification **    Please sign and return to [facility] [address] [facility]   [address] [facility]  [phone] fax

## 2021-08-09 ENCOUNTER — HOSPITAL ENCOUNTER (OUTPATIENT)
Dept: PHYSICAL THERAPY | Age: 66
Discharge: HOME | End: 2021-08-09
Payer: MEDICARE

## 2021-08-09 PROCEDURE — 97032 APPL MODALITY 1+ESTIM EA 15: CPT

## 2021-08-09 PROCEDURE — 97530 THERAPEUTIC ACTIVITIES: CPT

## 2021-08-09 NOTE — PROGRESS NOTES
OT DAILY TREATMENT NOTE 10-18    Patient Name: Radha Landing  Date:2021  : 1955  [x]  Patient  Verified  Payor: [place] MEDICARE / Plan: VA MEDICARE PART A & B / Product Type: Medicare /    In time:1115  Out time:1200  Total Treatment Time (min): 45  Visit #: 2 of 8    Medicare/BCBS Only   Total Timed Codes (min):  45 1:1 Treatment Time:  45     Treatment Area: Upper extremity weakness [R29.898]  Hemiplegia and hemiparesis following cerebral infarction affecting unspecified side [I69.359]    SUBJECTIVE  Pain Level (0-10 scale): 010  Any medication changes, allergies to medications, adverse drug reactions, diagnosis change, or new procedure performed?: [x] No    [] Yes (see summary sheet for update)  Subjective functional status/changes:   [] No changes reported  The hand works sometimes    OBJECTIVE    Modality rationale: increase tissue extensibility and increase muscle contraction/control to improve the patients ability to flex elbow   Min Type Additional Details    [] Estim:  []Unatt       []IFC  []Premod                        []Other:  []w/ice   []w/heat  Position:  Location:   15 [] Estim: []Att    []TENS instruct  [x]NMES                    []Other:  []w/US   []w/ice   []w/heat  Position: supine  Location: Bicep- elbow flexion    []  Traction: [] Cervical       []Lumbar                       [] Prone          []Supine                       []Intermittent   []Continuous Lbs:  [] before manual  [] after manual    []  Ultrasound: []Continuous   [] Pulsed                           []1MHz   []3MHz Location:  W/cm2:    []  Iontophoresis with dexamethasone         Location: [] Take home patch   [] In clinic    []  Ice     []  heat  []  Ice massage  []  Laser   []  Anodyne Position:  Location:    []  Laser with stim  []  Other: Position:  Location:    []  Vasopneumatic Device  Pre-treatment girth:  Post-treatment girth:  Measured at (location):  Pressure:       [] lo [] med [] hi   Temperature: [] lo [] med [] hi   [x] Skin assessment post-treatment:  []intact []redness- no adverse reaction    []redness  adverse reaction:     30 min Therapeutic Activity:  []  See flow sheet :   Rationale: increase ROM and improve coordination  to improve the patients ability to pinch    Foam Cube - therapy sponge cut into cubes- focus on pinch and release, volitional control of thumb/hand, focus on release- administered for HEP- able to move 8 pieces     Review of Tone/Ergonomics and anatomical positioning     Re-Education on importance of slow/controlled movement      With   [] TE   [] TA   [] neuro   [] other: Patient Education: [x] Review HEP    [] Progressed/Changed HEP based on:   [] positioning   [] body mechanics   [] transfers   [] heat/ice application   [] Splint wear/care   [] Sensory re-education   [] scar management      [] other:            Other Objective/Functional Measures:     Ongoing cueing to breathe required      Pain Level (0-10 scale) post treatment: 0/10    ASSESSMENT/Changes in Function: good response to NMES for elbow flexion     Patient will continue to benefit from skilled OT services to modify and progress therapeutic interventions, address ROM deficits, address strength deficits, analyze and cue movement patterns and instruct in home and community integration to attain remaining goals. []  See Plan of Care  []  See progress note/recertification  []  See Discharge Summary         Progress towards goals / Updated goals:  Short Term Goals: To be accomplished in 2 weeks:  1. Patient will be independent in home exercise program for AROM for elbow/shoulder. 2.  Patient will be independent in use of NMES to facilitate better elbow flexion. 8/9: Initiated on this date     3. Patient will be able to pinch to  items with right hand  8/9 Difficulty with release, Foam cubes administered for HEP     Long Term Goals:  To be accomplished in 4 weeks:              1.  Patient will be able to use right hand as assist in buttoning  2. Patient will be able to flex right elbow to bring hand ot table level to assist with self care tasks  3. Patient will be able to reach with right hand to turn on faucet. 4.  Patient will be able to use right hand to assist in washing  Face as shown by increase in FOTO of at least 5 points.       PLAN  []  Upgrade activities as tolerated     [x]  Continue plan of care  []  Update interventions per flow sheet       []  Discharge due to:_  []  Other:_      JUSTIN Jensen/ROVERTO 8/9/2021  10:28 AM    Future Appointments   Date Time Provider Marc Ruvalcaba   8/9/2021 11:15 AM Celia Lints UFWMATR [facility]   8/13/2021  9:45 AM Lucas Frye CHELSEA MMCPTPB [facility]   8/16/2021 11:00 AM Allen Monteiro   8/17/2021  9:45 AM Celia Lints [id number] [facility]   8/20/2021  9:45 AM Celia Lints UCDHPNH [facility]   8/23/2021 10:30 AM Celia Lints SJUGNHN [facility]   8/27/2021  9:45 AM Celia Lints MMCPTPB [facility]   8/30/2021  1:00 PM Rosalina Massey MD [facility] [facility]   8/31/2021 11:15 AM Monique Krishnamurthy, OTR/L MMCPTPB [facility]   9/3/2021  9:00 AM Monique Krishnamurthy, OTR/L MMCPTPB [facility]   9/8/2021 11:15 AM Celia Lints MMCPTPB [facility]   9/10/2021 12:00 PM Celia Lints MMCPTPB [facility]   11/29/2021  8:00 AM Bean Lee MD [facility]

## 2021-08-13 ENCOUNTER — HOSPITAL ENCOUNTER (OUTPATIENT)
Dept: PHYSICAL THERAPY | Age: 66
Discharge: HOME | End: 2021-08-13
Payer: MEDICARE

## 2021-08-13 PROCEDURE — 97110 THERAPEUTIC EXERCISES: CPT

## 2021-08-13 PROCEDURE — 97530 THERAPEUTIC ACTIVITIES: CPT

## 2021-08-13 NOTE — PROGRESS NOTES
OT DAILY TREATMENT NOTE - [facility]     Patient Name: René Bianchi  Date:2021  : 1955  [x]  Patient  Verified  Payor: VA MEDICARE / Plan: VA MEDICARE PART A & B / Product Type: Medicare /    In time: 9:46  Out time: 10:30  Total Treatment Time (min): 44    Visit #: 3 of 8         Medicare/BCBS Only   Total Timed Codes (min):  44 1:1 Treatment Time:  44        Treatment Area: Upper extremity weakness [R29.898]  Hemiplegia and hemiparesis following cerebral infarction affecting unspecified side [I69.359]    SUBJECTIVE  Pain Level (0-10 scale): 0/10  Any medication changes, allergies to medications, adverse drug reactions, diagnosis change, or new procedure performed?: [x] No    [] Yes (see summary sheet for update)  Subjective functional status/changes:   [] No changes reported    \"I can dress and bath myself\"   \"No pain today\"    OBJECTIVE     24 min Therapeutic Exercise:  [] See flow sheet :   Rationale: increase ROM and increase strength to improve the patients ability to use right UE for functional daily activities. Right UE:     Shruggs/retraction seated edge of mat. Shoulder PROM. Self AAROM shoulder flexion, horizontal abd/add, elbow flex/ext, wrist sup/pro , wrist flex/ext   Matched resistance shoulder flexion  And abd/add  Seated edge of mat. Matches resistance elbow flex/ext side lying on mat. 20 min Therapeutic Activity:  []  See flow sheet :   Rationale: increase ROM and improve coordination  to improve the patients ability to pinch and grasp. PVC pipe grasp and release x5 - in standing. Foam piece  x5 - lateral and pinch. Soft putty pinch/squeeze - lateral pinch.      With   [] TE   [] TA   [] neuro   [] other: Patient Education: [] Review HEP    [] Progressed/Changed HEP based on:   [] positioning   [] body mechanics   [] transfers   [] heat/ice application   [] Splint wear/care   [] Sensory re-education   [] scar management      [] other:            Other Objective/Functional Measures:     Completed all AAROM and pinch/grasp activities. Verbal cues for pace when completing AAROM and self-PROM. Pain Level (0-10 scale) post treatment: 0/10    ASSESSMENT/Changes in Function: Progressing with pinch and grasp activities. Progressing with AROM HEP. Patient will continue to benefit from skilled OT services to address ROM deficits, address strength deficits, analyze and cue movement patterns and analyze and modify body mechanics/ergonomics to attain remaining goals. [x]  See Plan of Care  []  See progress note/recertification  []  See Discharge Summary         Progress towards goals / Updated goals:    Short Term Goals: To be accomplished in 2 weeks:  1.  Patient will be independent in home exercise program for AROM for elbow/shoulder. 8/13/21: Reviewed, added self AAROM and floor reach to HEP. 2.  Patient will be independent in use of NMES to facilitate better elbow flexion. 8/9: Initiated on this date   8/13/21: Completing at home, per pt report.      3.  Patient will be able to pinch to  items with right hand  8/9 Difficulty with release, Foam cubes administered for HEP  8/13/21: Difficulty with release pinch and grasp activities.      Long Term Goals: To be accomplished in 4 weeks:              1.  Patient will be able to use right hand as assist in buttoning  2.  Patient will be able to flex right elbow to bring hand ot table level to assist with self care tasks  3.  Patient will be able to reach with right hand to turn on faucet.   4.  Patient will be able to use right hand to assist in washing  Face as shown by increase in FOTO of at least 5 points.         PLAN  []  Upgrade activities as tolerated     [x]  Continue plan of care  []  Update interventions per flow sheet       []  Discharge due to:_  []  Other:_      CHELSEA Jennings 8/13/2021  9:17 AM    Future Appointments   Date Time Provider Marc Ruvalcaba   8/13/2021  9:45 AM CHELSEA Floyd MMCPTPB [facility]   8/16/2021 11:00 AM [facility] NURSE NOAM ADEBAYO DAGMAR   8/17/2021  9:45 AM David Reese RUMHDDJ [facility]   8/20/2021  9:45 AM David Reese NVSTRIA [facility]   8/23/2021 10:30 AM David Reese OAHDNLH [facility]   8/27/2021  9:45 AM David Reese WAWDGDW [facility]   8/30/2021  1:00 PM Ervin Lesch, Olga Arthur, MD [facility] [facility]   8/31/2021 11:15 AM García Fall, OTR/L MMCPTPB [facility]   9/3/2021  9:00 AM García Fall, OTR/L MMCPTPB [facility]   9/8/2021 11:15 AM David Reese LHPZMRR [facility]   9/10/2021 12:00 PM David Reese MMCPTPB [facility]   11/29/2021  8:00 AM Gómez Kelly MD [facility]

## 2021-08-17 ENCOUNTER — HOSPITAL ENCOUNTER (OUTPATIENT)
Dept: PHYSICAL THERAPY | Age: 66
Discharge: HOME | End: 2021-08-17
Payer: MEDICARE

## 2021-08-17 PROCEDURE — 97112 NEUROMUSCULAR REEDUCATION: CPT

## 2021-08-17 PROCEDURE — 97530 THERAPEUTIC ACTIVITIES: CPT

## 2021-08-17 PROCEDURE — 97535 SELF CARE MNGMENT TRAINING: CPT

## 2021-08-17 NOTE — PROGRESS NOTES
OT DAILY TREATMENT NOTE 10-18    Patient Name: Zackary Kitchen  Date:2021  : 1955  [x]  Patient  Verified  Payor: VA MEDICARE / Plan: VA MEDICARE PART A & B / Product Type: Medicare /    In time:950  Out time:1032  Total Treatment Time (min): 42  Visit #: 4 of 8    Medicare/BCBS Only   Total Timed Codes (min):  42 1:1 Treatment Time:  42     Treatment Area: Upper extremity weakness [R29.898]  Hemiplegia and hemiparesis following cerebral infarction affecting unspecified side [I69.359]    SUBJECTIVE  Pain Level (0-10 scale): 0/10  Any medication changes, allergies to medications, adverse drug reactions, diagnosis change, or new procedure performed?: [x] No    [] Yes (see summary sheet for update)  Subjective functional status/changes:   [] No changes reported  \" I did put down that I wanted to work on buttoning but I wish I was doing it with my right hand\" (Right hand was tasked with being functional assist)     OBJECTIVE    10 min Therapeutic Activity:  []  See flow sheet :   Rationale: increase ROM and improve coordination  to improve the patients ability to turn water on, functionally use Right UE    Simulated task: turning faucet lever with use of Derek wrench and nut with built up handle- performed in stand       15 min Neuromuscular Re-education:  []  See flow sheet :   Rationale: increase ROM, increase strength and improve coordination  to improve the patients ability to functionally use Right UE, improve carryover for better understanding of recovery     Hand to table: 5x  Education provided to Patient on recovery and outcomes, setting realistic expectations   Shrugs/Retraction: 2x15     16 min Self Care/Home Management:    Rationale: increase ROM, increase strength and improve coordination  to improve the patients ability to perform ADL/IADL's with increased independence and safety     Button/Unbutton- Table level with left hand and Right hand/UE as functional assist   Simulated Face Washing: Able to hold towel in Right Hand with left hand assist to bring to face with elbow on table  Education on importance of incorporating Right UE into functional tasks           With   [] TE   [] TA   [] neuro   [] other: Patient Education: [x] Review HEP    [] Progressed/Changed HEP based on:   [] positioning   [] body mechanics   [] transfers   [] heat/ice application   [] Splint wear/care   [] Sensory re-education   [] scar management      [] other:            Other Objective/Functional Measures:     Able to simulate face washing with use of BUE     Pain Level (0-10 scale) post treatment: 0/10    ASSESSMENT/Changes in Function:  Patient would benefit from ongoing education on recovery expectations. Attempted to educate on role of OT and setting realistic goal expectations with patient voicing understanding. Patient will continue to benefit from skilled OT services to modify and progress therapeutic interventions, address ROM deficits, address strength deficits and instruct in home and community integration to attain remaining goals. []  See Plan of Care  []  See progress note/recertification  []  See Discharge Summary         Progress towards goals / Updated goals:  Short Term Goals: To be accomplished in 2 weeks:  1.  Patient will be independent in home exercise program for AROM for elbow/shoulder. 8/13/21: Reviewed, added self AAROM and floor reach to HEP.      2.  Patient will be independent in use of NMES to facilitate better elbow flexion.   8/9: Initiated on this date   8/13/21: Completing at home, per pt report.      3.  Patient will be able to pinch to  items with right hand  8/9 Difficulty with release, Foam cubes administered for HEP  8/13/21: Difficulty with release pinch and grasp activities.      Long Term Goals: To be accomplished in 4 weeks:              1.  Patient will be able to use right hand as assist in buttoning  8/17: Able to use Right UE as functional assist for buttoning/unbuttoning at table level     2.  Patient will be able to flex right elbow to bring hand ot table level to assist with self care tasks  8/17:    3.  Patient will be able to reach with right hand to turn on faucet.     4.  Patient will be able to use right hand to assist in washing Face as shown by increase in FOTO of at least 5 points.      PLAN  []  Upgrade activities as tolerated     [x]  Continue plan of care  []  Update interventions per flow sheet       []  Discharge due to:_  []  Other:_      Shmuel Endow ,ANDERSON/L 8/17/2021  9:33 AM    Future Appointments   Date Time Provider Marc Peg   8/17/2021  9:45 AM Jodeen Sensing [id number] [facility]   8/20/2021  9:45 AM Jodeen Sensing HXUTGXM [facility]   8/23/2021 10:30 AM Jodeen Sensing IIVGJVI [facility]   8/27/2021  9:45 AM Jodeen Sensing [id number] [facility]   8/30/2021  1:00 PM Yikna Denton MD [address] [facility]   8/31/2021 11:15 AM Matilde Sandoval, OTR/L MMCPTPB [facility]   9/3/2021  9:00 AM Matilde Sandoval OTR/L MMCPTPB [facility]   9/8/2021 11:15 AM Jodeen Sensing NRAUBWC [facility]   9/10/2021 12:00 PM Jodeen Sensing EPNYWPY [facility]   11/29/2021  8:00 AM Morena Prasad [facility]

## 2021-08-20 ENCOUNTER — HOSPITAL ENCOUNTER (OUTPATIENT)
Dept: PHYSICAL THERAPY | Age: 66
Discharge: HOME | End: 2021-08-20
Payer: MEDICARE

## 2021-08-20 PROCEDURE — 97530 THERAPEUTIC ACTIVITIES: CPT

## 2021-08-20 PROCEDURE — 97112 NEUROMUSCULAR REEDUCATION: CPT

## 2021-08-20 PROCEDURE — 97110 THERAPEUTIC EXERCISES: CPT

## 2021-08-20 NOTE — PROGRESS NOTES
OT DAILY TREATMENT NOTE 10-18    Patient Name: Siria Deal  Date:2021  : 1955  [x]  Patient  Verified  Payor: VA MEDICARE / Plan: VA MEDICARE PART A & B / Product Type: Medicare /    In time:947  Out time:1028  Total Treatment Time (min): 41  Visit #: 5 of 8    Medicare/BCBS Only   Total Timed Codes (min):  41 1:1 Treatment Time:  41     Treatment Area: Upper extremity weakness [R29.898]  Hemiplegia and hemiparesis following cerebral infarction affecting unspecified side [I69.359]    SUBJECTIVE  Pain Level (0-10 scale): 0/10  Any medication changes, allergies to medications, adverse drug reactions, diagnosis change, or new procedure performed?: [x] No    [] Yes (see summary sheet for update)  Subjective functional status/changes:   [] No changes reported  Patient reports using Right UE to turn on water faucet but was unable to use right UE to turn it off    OBJECTIVE    15 min Therapeutic Exercise:  [] See flow sheet :   Rationale: increase ROM and increase strength to improve the patients ability to reach    Horizontal Abduction/Adduction (Self Stretch) BUE- 5x with 5 second hold   Shrugs/Retraction  BUE Floor Stretch    8 min Neuromuscular Re-education:  []  See flow sheet :   Rationale: increase ROM, increase strength and improve coordination  to improve the patients ability to functionally use Right UE    Weightbearing into Right UE- in stand- onto table   Review of Tone and importance of slowing down movement patterns      18 min Therapeutic Activity:  []  See flow sheet :   Rationale: increase ROM and improve coordination  to improve the patients ability to pinch    1 in wooden block placement- in stand /sit- 9x  Review of use of Right UE into home tasks - Faucet, refrigerator door        With   [] TE   [] TA   [] neuro   [] other: Patient Education: [x] Review HEP    [] Progressed/Changed HEP based on:   [] positioning   [] body mechanics   [] transfers   [] heat/ice application   [] Splint wear/care   [] Sensory re-education   [] scar management      [] other:            Other Objective/Functional Measures:     Ongoing cueing to slow movement patterns down required        Pain Level (0-10 scale) post treatment: 0/10    ASSESSMENT/Changes in Function: trace movement in thumb noted    Patient will continue to benefit from skilled OT services to modify and progress therapeutic interventions, address ROM deficits, address strength deficits and instruct in home and community integration to attain remaining goals. []  See Plan of Care  []  See progress note/recertification  []  See Discharge Summary         Progress towards goals / Updated goals:  Short Term Goals: To be accomplished in 2 weeks:  1.  Patient will be independent in home exercise program for AROM for elbow/shoulder.   8/13/21: Reviewed, added self AAROM and floor reach to HEP.       2.  Patient will be independent in use of NMES to facilitate better elbow flexion. 8/9: Initiated on this date   8/13/21: Completing at home, per pt report.      3. Orie Antis will be able to pinch to  items with right hand   8/9 Difficulty with release, Foam cubes administered for HEP  8/13/21: Difficulty with release pinch and grasp activities.      Long Term Goals: To be accomplished in 4 weeks:              1.  Patient will be able to use right hand as assist in buttoning  8/17: Able to use Right UE as functional assist for buttoning/unbuttoning at table level     2.  Patient will be able to flex right elbow to bring hand ot table level to assist with self care tasks  8/20: Able to bring hand to table 5x from lap     3.  Patient will be able to reach with right hand to turn on faucet.   8/20: Patient reports being able to use Right hand to turn on faucet but unable to turn off      4.  Patient will be able to use right hand to assist in washing Face as shown by increase in FOTO of at least 5 points.      PLAN  []  Upgrade activities as tolerated [x]  Continue plan of care  []  Update interventions per flow sheet       []  Discharge due to:_  []  Other:_      JUSTIN Springer/L 8/20/2021  9:44 AM    Future Appointments   Date Time Provider Marc Ruvalcaba   8/20/2021  9:45 AM Early Bibmarco [id number] [facility]   8/23/2021 10:30 AM Early Bibmarco [id number] [facility]   8/27/2021  9:45 AM Early Bibmarco [id number] [facility]   8/30/2021  1:00 PM Deneen Carlson MD [facility]   8/31/2021 11:15 AM Harjinder Calderón, OTR/L MMCPTPB [facility]   9/3/2021  9:00 AM Harjinder Calderón, OTR/L MMCPTPB [facility]   9/8/2021 11:15 AM Early Michele UMUNFAK [facility]   9/10/2021 12:00 PM Early Bible MMCPTPB [facility]   11/29/2021  8:00 AM Joe Whitley MD [facility] low sodium

## 2021-08-23 ENCOUNTER — HOSPITAL ENCOUNTER (OUTPATIENT)
Dept: PHYSICAL THERAPY | Age: 66
Discharge: HOME | End: 2021-08-23
Payer: MEDICARE

## 2021-08-23 PROCEDURE — 97530 THERAPEUTIC ACTIVITIES: CPT

## 2021-08-23 PROCEDURE — 97112 NEUROMUSCULAR REEDUCATION: CPT

## 2021-08-23 PROCEDURE — 97110 THERAPEUTIC EXERCISES: CPT

## 2021-08-23 NOTE — PROGRESS NOTES
OT DAILY TREATMENT NOTE 10-18    Patient Name: Johanna Yeh  Date:2021  : 1955  [x]  Patient  Verified  Payor: [place] MEDICARE / Plan: VA MEDICARE PART A & B / Product Type: Medicare /    In time:1030  Out time:1115  Total Treatment Time (min): 45  Visit #: 6 of 8    Medicare/BCBS Only   Total Timed Codes (min):  45 1:1 Treatment Time:  45     Treatment Area: Upper extremity weakness [R29.898]  Hemiplegia and hemiparesis following cerebral infarction affecting unspecified side [I69.359]    SUBJECTIVE  Pain Level (0-10 scale): 0/10  Any medication changes, allergies to medications, adverse drug reactions, diagnosis change, or new procedure performed?: [x] No    [] Yes (see summary sheet for update)  Subjective functional status/changes:   [] No changes reported  Patient presents at OT session today with multiple skin tears on Right UE.  Per patient injury occurred when attempted to get out of his truck before starting therapy       OBJECTIVE  10 min Therapeutic Exercise:  [] See flow sheet :   Rationale: increase ROM and increase strength to improve the patients ability to reach       BUE: Floor Reach (Thumb Up)  BUE- Protraction (Thumb Up)  BUE- Horizontal Abduction/Adduction with hold     20 min Therapeutic Activity:  []  See flow sheet :   Rationale: increase ROM and improve coordination  to improve the patients ability to grasp/release     Stress Ball:  from table with right hand, release to right side 3x  1 in wooden dowel: 2x   1 in peg's from resistive pegboard   Hat placed on head - Max A    15 min Neuromuscular Re-education:  []  See flow sheet :   Rationale: increase ROM, increase strength and improve coordination  to improve the patients ability to Functionally use Right UE     Hand to Table  Review of Right UE incorporation in to daily tasks   Matched resistance with Elbow Flexion/Extension        With   [] TE   [] TA   [] neuro   [] other: Patient Education: [x] Review HEP    [] Progressed/Changed HEP based on:   [] positioning   [] body mechanics   [] transfers   [] heat/ice application   [] Splint wear/care   [] Sensory re-education   [] scar management      [] other:            Other Objective/Functional Measures:     Ongoing cueing to not hold breathe       Pain Level (0-10 scale) post treatment: 0/10    ASSESSMENT/Changes in Function: Trace thumb Abduction    Patient will continue to benefit from skilled OT services to modify and progress therapeutic interventions, address ROM deficits, address strength deficits, analyze and cue movement patterns and instruct in home and community integration to attain remaining goals. []  See Plan of Care  []  See progress note/recertification  []  See Discharge Summary         Progress towards goals / Updated goals:  Short Term Goals: To be accomplished in 2 weeks:  1.  Patient will be independent in home exercise program for AROM for elbow/shoulder.   8/13/21: Reviewed, added self AAROM and floor reach to HEP.       2.  Patient will be independent in use of NMES to facilitate better elbow flexion. 8/9: Initiated on this date   8/13/21: Completing at home, per pt report.      3. Benito Bennett will be able to pinch to  items with right hand   8/9 Difficulty with release, Foam cubes administered for HEP  8/13/21: Difficulty with release pinch and grasp activities.      Long Term Goals: To be accomplished in 4 weeks:              1.  Patient will be able to use right hand as assist in buttoning  8/17: Able to use Right UE as functional assist for buttoning/unbuttoning at table level     2.  Patient will be able to flex right elbow to bring hand ot table level to assist with self care tasks  8/20: Able to bring hand to table 5x from lap     3.  Patient will be able to reach with right hand to turn on faucet.   8/20: Patient reports being able to use Right hand to turn on faucet but unable to turn off      4.  Patient will be able to use right hand to assist in washing Face as shown by increase in FOTO of at least 5 points.      PLAN  []  Upgrade activities as tolerated     [x]  Continue plan of care  []  Update interventions per flow sheet       []  Discharge due to:_  []  Other:_      JUSTIN Metzger/ROVERTO 8/23/2021  10:37 AM    Future Appointments   Date Time Provider Marc Peg   8/27/2021  9:45 AM Sarahshari Hdez VGBOFXB [facility]   8/30/2021  1:00 PM Fern Torres MD [facility]   8/31/2021 11:15 AM Laya Kenyon OTR/L MMCPTPB [facility]   9/3/2021  9:00 AM Laya Kenyon OTR/L MMCPTPB [facility]   9/8/2021 11:15 AM Sarah Hdez IMGPEDB [facility]   9/10/2021 12:00 PM Sarah Hdez QBZGPWI [facility]   11/29/2021  8:00 AM Wendy Narvaez MD [facility] AMB

## 2021-08-27 ENCOUNTER — HOSPITAL ENCOUNTER (OUTPATIENT)
Dept: PHYSICAL THERAPY | Age: 66
Discharge: HOME | End: 2021-08-27
Payer: MEDICARE

## 2021-08-27 PROCEDURE — 97112 NEUROMUSCULAR REEDUCATION: CPT

## 2021-08-27 PROCEDURE — 97535 SELF CARE MNGMENT TRAINING: CPT

## 2021-08-27 PROCEDURE — 97110 THERAPEUTIC EXERCISES: CPT

## 2021-08-27 NOTE — PROGRESS NOTES
OT DAILY TREATMENT NOTE 10-18    Patient Name: Elida Dickson  Date:2021  : 1955  [x]  Patient  Verified  Payor: VA MEDICARE / Plan: VA MEDICARE PART A & B / Product Type: Medicare /    In time:945  Out time:1030  Total Treatment Time (min): 45  Visit #: 7 of 8    Medicare/BCBS Only   Total Timed Codes (min):  45 1:1 Treatment Time:  45     Treatment Area: Upper extremity weakness [R29.898]  Hemiplegia and hemiparesis following cerebral infarction affecting unspecified side [I69.359]    SUBJECTIVE  Pain Level (0-10 scale): 0/10  Any medication changes, allergies to medications, adverse drug reactions, diagnosis change, or new procedure performed?: [x] No    [] Yes (see summary sheet for update)  Subjective functional status/changes:   [] No changes reported  Oh this is from last time I got hurt, new bandaid     OBJECTIVE  15 min Therapeutic Exercise:  [] See flow sheet :   Rationale: increase ROM and increase strength to improve the patients ability to reach     Self Supination Stretch   BUE Floor Stretch- Thumb up  Cane Exercises: Horizontal Abduction, Gear Shift      15 min Neuromuscular Re-education:  []  See flow sheet :   Rationale: increase ROM, increase strength and improve coordination  to improve the patients ability to functional use Right UE/Extend Right Elbow    Hand to Table 10x  Review of movement patterns   Towel Slide- Elbow Extension   Education on role of oxygen on muscles and importance of proper breathing mechanics     15 min Self Care/Home Management: Functional Assist    Rationale: increase ROM, increase strength and improve coordination  to improve the patients ability to perform home/sef care tasks with increased independence and ease.      Right Functional Assist- Unbutton/button shirt-=- Right as functional assist with Left hand performing task     Dust Bach: (Standing) Held Dust pan with Right hand while Left manipulated hand brush to sweep gems into pan         Right hand on hand brush (Hand over hand assistance required) to sweep into dust pan         With   [] TE   [] TA   [] neuro   [] other: Patient Education: [x] Review HEP    [] Progressed/Changed HEP based on:   [] positioning   [] body mechanics   [] transfers   [] heat/ice application   [] Splint wear/care   [] Sensory re-education   [] scar management      [] other:            Other Objective/Functional Measures:     Ongoing need for cueing to breathe and not hold breathe when performing all tasks     Cueing to slow down and use proper movements vs momentum      Pain Level (0-10 scale) post treatment: 0/10    ASSESSMENT/Changes in Function: encouragement required to incorporate Right UE into daily tasks, patient would benefit from ongoing education on body mechanics and setting realistic expectations for recovery     Patient will continue to benefit from skilled OT services to modify and progress therapeutic interventions, address ROM deficits, address strength deficits, analyze and cue movement patterns and instruct in home and community integration to attain remaining goals. []  See Plan of Care  []  See progress note/recertification  []  See Discharge Summary         Progress towards goals / Updated goals:  Short Term Goals: To be accomplished in 2 weeks:  1.  Patient will be independent in home exercise program for AROM for elbow/shoulder.   8/27: added self supination stretch      2.  Patient will be independent in use of NMES to facilitate better elbow flexion.   8/9: Initiated on this date   8/13/21: Completing at home, per pt report.      3. Juaquin Paredesl will be able to pinch to  items with right hand   8/9 Difficulty with release, Foam cubes administered for HEP  8/13/21: Difficulty with release pinch and grasp activities.      Long Term Goals: To be accomplished in 4 weeks:              1.  Patient will be able to use right hand as assist in buttoning  8/27: Able to use Right UE as functional assist for buttoning/unbuttoning at table level, use of dust pan     2.  Patient will be able to flex right elbow to bring hand ot table level to assist with self care tasks  8/27: Able to bring hand to table 10x from lap     3.  Patient will be able to reach with right hand to turn on faucet.   8/20: Patient reports being able to use Right hand to turn on faucet but unable to turn off      4.  Patient will be able to use right hand to assist in washing Face as shown by increase in FOTO of at least 5 points.      PLAN  []  Upgrade activities as tolerated     [x]  Continue plan of care  []  Update interventions per flow sheet       []  Discharge due to:_  []  Other:_      JUSTIN Guallpa/L 8/27/2021  9:45 AM    Future Appointments   Date Time Provider Marc Ruvalcaba   8/30/2021  1:00 PM Jason Lawson MD [facility]   8/31/2021 11:15 AM Dean Oliver OTR/L MMCPTPB [facility]   9/3/2021  9:00 AM Dean Oliver OTR/L MMCPTPB [facility]   9/8/2021 11:15 AM [facility] FBSGBYC [facility]   9/10/2021 12:00 PM [facility] IWLJDYM [facility]   9/20/2021 10:30 AM [facility] GLMSFWW [facility]   9/27/2021 10:30 AM Dean Oliver OTR/L MMCPTPB [facility]   9/30/2021 10:30 AM Dean Oliver OTR/L MMCPTPB [facility]   10/4/2021 10:30 AM Dean Oliver OTR/L MMCPTPB [facility]   10/7/2021 10:30 AM Dean Oliver OTR/L MMCPTPB [facility]   10/11/2021 10:30 AM Dean Oliver, OTR/L MMCPTPB [facility]   10/14/2021 10:30 AM Dean Oliver, OTR/L MMCPTPB [facility]   10/18/2021 10:30 AM Dean Oliver, OTR/L MMCPTPB [facility]   11/29/2021  8:00 AM Nora Mojica MD [facility] AMB [facility]

## 2021-08-31 ENCOUNTER — HOSPITAL ENCOUNTER (OUTPATIENT)
Dept: PHYSICAL THERAPY | Age: 66
Discharge: HOME | End: 2021-08-31
Payer: MEDICARE

## 2021-08-31 PROCEDURE — 97530 THERAPEUTIC ACTIVITIES: CPT

## 2021-08-31 PROCEDURE — 97110 THERAPEUTIC EXERCISES: CPT

## 2021-08-31 NOTE — PROGRESS NOTES
[facility]  [place] [employer] [facility] [place]  [place]  [address] [facility]  [phone] fax    Continued Plan of Care/ Re-certification for Occupational Therapy Services    Patient name: Samia Alva Start of Care: 21   Referral source: Ruben De La Rosa NP : 1955   Medical/Treatment Diagnosis: Upper extremity weakness [R29.898]  Hemiplegia and hemiparesis following cerebral infarction affecting unspecified side [I69.359]  Payor: VA MEDICARE / Plan: VA MEDICARE PART A & B / Product Type: Medicare /  Onset Date:2019     Prior Hospitalization: see medical history Provider#: [id number]   Medications: Verified on Patient Summary List    Comorbidities:  HTN, arthritis  Prior Level of Function:REtired from [employer], walking, collect Pez dispensers, driving, mow lawn  Visits from Start of Care: 8  Missed Visits: 0    The Plan of Care and following information is based on the patient's current status:    Current measures listed below with prior in ( )  9 hole right 2:38 remove all (unable to remove any)  Elbow flexion now 90 (was 55)  Shoulder flex now 90 (was 65)                   15        (was 10)  Lat pinch        3          (was 2)  FOTO              47        (48)       1.  Patient will be independent in home exercise program for AROM for elbow/shoulder.   Eval status; Initial review  Current status; Met     2.  Patient will be independent in use of NMES to facilitate better elbow flexion. Eval status; Unaware of use  current status;  Met     3.  Patient will be able to pinch to  items with right hand   Eval status: Unable  Current status; Progressing, now able to complete removal portion of 9 hole peg test      Long Term Goals: To be accomplished in 4 weeks:              1.  Patient will be able to use right hand as assist in buttoning  Eval status: Unable  Current status; Progressing,  Able to use Right UE as functional assist for buttoning/unbuttoning at table level, use of dust pan     2.  Patient will be able to flex right elbow to bring hand ot table level to assist with self care tasks  Eval status; Unable  Current status; met     3.  Patient will be able to reach with right hand to turn on faucet. Eval status: Unable  Current status; Progressing, albe to turn on  with lever faucet, not able to turn off    4.  Patient will be able to use right hand to assist in washing Face as shown by increase in FOTO of at least 5 points.    Eval status; FOTO 48   Current status; not met, FOTO 47    Key functional changes:  Improved fine motor , shoulder and elbow ROm, , pinch      Problems/ barriers to goal attainment: none     Treatment Plan: Therapeutic exercise, Therapeutic activities, Neuromuscular re-education, Physical agent/modality, Patient education and ADLs/IADLs      Patient Goal (s) has been updated and includes: Use hand again     Goals for this certification period to be accomplished in 4 weeks:  3.  Patient will be able to pinch to  items with right hand   Status PN; Progressing, now able to complete removal portion of 9 hole peg test      Long Term Goals: To be accomplished in 4 weeks:              1.  Patient will be able to use right hand as assist in buttoning  Status PN; Progressing,  Able to use Right UE as functional assist for buttoning/unbuttoning     3.  Patient will be able to reach with right hand to turn on and off faucet. Status PN; Progressing, albe to turn on  with lever faucet, not able to turn off     4.  Patient will be able to use right hand to assist in washing Face as shown by increase in FOTO of at least 5 points.     Status PN; not met, FOTO 47    Frequency / Duration: Patient to be seen 2 times per week for 4 weeks:    Assessment / Recommendations:Patient will benefit from continued skilled occupational therapy to increase upper extremity function and functional independence.       Certification Period: 9/1/21-9/30/21    Danish Aguilar Juan Jose Zhao OTR/L 8/31/2021 2:48 PM    ________________________________________________________________________  I certify that the above Therapy Services are being furnished while the patient is under my care. I agree with the treatment plan and certify that this therapy is necessary.       Physician's Signature:____________Date:_________TIME:________     Court , NP  ** Signature, Date and Time must be completed for valid certification **      Please sign and return to [facility] [address]            [phone] fax

## 2021-08-31 NOTE — PROGRESS NOTES
OT DAILY TREATMENT NOTE     Patient Name: Johanna Yeh  Date:2021  : 1955  [x]  Patient  Verified  Payor: VA MEDICARE / Plan: VA MEDICARE PART A & B / Product Type: Medicare /    In time:1120  Out time:1200  Total Treatment Time (min): 40  Visit #: 8 of 8    Medicare/BCBS Only   Total Timed Codes (min):  40 1:1 Treatment Time:  40     Treatment Area: Upper extremity weakness [R29.898]  Hemiplegia and hemiparesis following cerebral infarction affecting unspecified side [I69.359]    SUBJECTIVE  Pain Level (0-10 scale): 0/10  Any medication changes, allergies to medications, adverse drug reactions, diagnosis change, or new procedure performed?: [x] No    [] Yes (see summary sheet for update)  Subjective functional status/changes:   [] No changes reported  I hope I can continue  I am using the e stim  Sometimes my fingers wiggle when I am doing it on my wrist    OBJECTIVE          25 min Therapeutic Exercise:  [] See flow sheet :   Rationale: increase ROM and increase strength to improve the patients ability to reach  Review and demonstrated: cane exercises for ROm, floor reach  Supine with elbow flex ext with shoulder flex  Recheck shoulder ROm, , lat pinch    15 min Therapeutic Activity:  []  See flow sheet :   Rationale: increase ROM and improve coordination  to improve the patients ability to reach  Pinch grasp and release with right hand from mat to between legs with 1 inch cubes, cylinders, clothespins, foam cubes x 10  Recheck 9 hole peg       With   [] TE   [] TA   [] neuro   [] other: Patient Education: [x] Review HEP    [] Progressed/Changed HEP based on:  Progress made  [] positioning   [] body mechanics   [] transfers   [] heat/ice application   [] Splint wear/care   [] Sensory re-education   [] scar management      [] other:            Other Objective/Functional Measures:   Current measures listed below with prior in ( )  9 hole right 2:38 remove all (unable to remove any)  Elbow flexion now 90 (was 55)  Shoulder flex now 90 (was 65)     15 (was 10)  Lat pinch 3 (was 2)  FOTO   47 (48)     Pain Level (0-10 scale) post treatment: 0/10    ASSESSMENT/Changes in Function: Improved fine motor , shoulder and elbow ROm, , pinch    Patient will continue to benefit from skilled OT services to modify and progress therapeutic interventions, address ROM deficits, address strength deficits, analyze and address soft tissue restrictions, analyze and cue movement patterns and instruct in home and community integration to attain remaining goals. []  See Plan of Care  [x]  See progress note/recertification  []  See Discharge Summary         Progress towards goals / Updated goals:  1.  Patient will be independent in home exercise program for AROM for elbow/shoulder.   Eval status; Initial review  Current status; Met     2.  Patient will be independent in use of NMES to facilitate better elbow flexion. Eval status; Unaware of use  current status; Met     3.  Patient will be able to pinch to  items with right hand   Eval status: Unable  Current status; Progressing, now able to complete removal portion of 9 hole peg test      Long Term Goals: To be accomplished in 4 weeks:              1.  Patient will be able to use right hand as assist in buttoning  Eval status: Unable  Current status; Progressing,  Able to use Right UE as functional assist for buttoning/unbuttoning at table level, use of dust pan     2.  Patient will be able to flex right elbow to bring hand ot table level to assist with self care tasks  Eval status; Unable  Current status; met     3.  Patient will be able to reach with right hand to turn on faucet.   Eval status: Unable  Current status; Progressing, albe to turn on  with lever faucet, not able to turn off    4.  Patient will be able to use right hand to assist in washing Face as shown by increase in FOTO of at least 5 points.    Eval status; FOTO 48   Current status; not met, FOTO 52    PLAN  []  Upgrade activities as tolerated     [x]  Continue plan of care  []  Update interventions per flow sheet       []  Discharge due to:_  []  Other:_      Chastity Cochran, OTR/L 8/31/2021  10:19 AM    Future Appointments   Date Time Provider Marc Ruvalcaba   8/31/2021 11:15 AM Kelley Clairen, OTR/L MMCPTPB [facility]   9/3/2021  9:00 AM Kelley Clairen, OTR/L MMCPTPB [facility]   9/8/2021 11:15 AM Mary Bacon SZUOJDN [facility]   9/10/2021 12:00 PM Mary Bacon DUXWHMB [facility]   9/20/2021 10:30 AM Mary Bacon VISSAMQ [facility]   9/27/2021 10:30 AM Kelley Dickensehn, OTR/L MMCPTPB [facility]   9/30/2021 10:30 AM Kelley Dickensehn, OTR/L MMCPTPB [facility]   10/4/2021 10:30 AM Kelley Dickensehn, OTR/L MMCPTPB [facility]   10/7/2021 10:30 AM Kelley Dickensehn, OTR/L MMCPTPB [facility]   10/11/2021 10:30 AM Kelley Dickensehn, OTR/L MMCPTPB [facility]   10/14/2021 10:30 AM Kelley Dickensehn, OTR/L MMCPTPB [facility]   10/18/2021 10:30 AM Kelley Dickensehn, OTR/L MMCPTPB [facility]   11/29/2021  8:00 AM Talita Alexis MD [facility]   8/30/2022 10:00 AM [facility] NURSE NOAM LEAVITT   9/6/2022 10:00 AM PRANAY Alston [address] [facility]

## 2021-09-03 ENCOUNTER — HOSPITAL ENCOUNTER (OUTPATIENT)
Dept: PHYSICAL THERAPY | Age: 66
Discharge: HOME | End: 2021-09-03
Payer: MEDICARE

## 2021-09-03 PROCEDURE — 97110 THERAPEUTIC EXERCISES: CPT

## 2021-09-03 PROCEDURE — 97530 THERAPEUTIC ACTIVITIES: CPT

## 2021-09-03 NOTE — PROGRESS NOTES
OT DAILY TREATMENT NOTE     Patient Name: Alex De Jesus  Date:9/3/2021  : 1955  [x]  Patient  Verified  Payor: [place] MEDICARE / Plan: [place] MEDICARE PART A & B / Product Type: Medicare /    In time:1030  Out time:1115  Total Treatment Time (min): 45  Visit #: 1 of 8    Medicare/BCBS Only   Total Timed Codes (min):  45 1:1 Treatment Time:  45     Treatment Area: Upper extremity weakness [R29.898]  Hemiplegia and hemiparesis following cerebral infarction affecting unspecified side [I69.359]    SUBJECTIVE  Pain Level (0-10 scale): 0/10  Any medication changes, allergies to medications, adverse drug reactions, diagnosis change, or new procedure performed?: [x] No    [] Yes (see summary sheet for update)  Subjective functional status/changes:   [] No changes reported  I wish my thumb would move    OBJECTIVE        25 min Therapeutic Exercise:  [] See flow sheet :   Rationale: increase ROM and increase strength to improve the patients ability to reach  Supine shoulder abduction   Supine shoulder abd to flexion to side x 10  Sidelying shoulder flexion, hor abd x 10  1# dowel shoulder flex hor ab add and elbow flex    20 min Therapeutic Activity:  []  See flow sheet :   Rationale: increase ROM and improve coordination  to improve the patients ability to grasp  [place] with PVC grasp remove and replace  1 inch dowels hook fist remove and replace, then 3 point pinch with forearm support          With   [] TE   [] TA   [] neuro   [] other: Patient Education: [x] Review HEP    [] Progressed/Changed HEP based on: hands behind head stretch  [] positioning   [] body mechanics   [] transfers   [] heat/ice application   [] Splint wear/care   [] Sensory re-education   [] scar management      [] other:            Other Objective/Functional Measures:   Able to achieve 3 point pinch and release with forearm support     Pain Level (0-10 scale) post treatment: 0/10    ASSESSMENT/Changes in Function: improving ability to  items    Patient will continue to benefit from skilled OT services to modify and progress therapeutic interventions, address ROM deficits, address strength deficits, analyze and address soft tissue restrictions, analyze and cue movement patterns and instruct in home and community integration to attain remaining goals. []  See Plan of Care  []  See progress note/recertification  []  See Discharge Summary         Progress towards goals / Updated goals:  3.  Patient will be able to pinch to  items with right hand   Status PN; Progressing, now able to complete removal portion of 9 hole peg test   9/3/21 able to pinch 1 inch dowels     Long Term Goals: To be accomplished in 4 weeks:              1.  Patient will be able to use right hand as assist in buttoning  Status PN; Progressing,  Able to use Right UE as functional assist for buttoning/unbuttoning      3.  Patient will be able to reach with right hand to turn on and off faucet.   Status PN; Progressing, albe to turn on  with lever faucet, not able to turn off     4.  Patient will be able to use right hand to assist in washing Face as shown by increase in FOTO of at least 5 points.     Status PN; not met, FOTO 47    PLAN  []  Upgrade activities as tolerated     [x]  Continue plan of care  []  Update interventions per flow sheet       []  Discharge due to:_  []  Other:_      Lashaun Livingston, OTR/L 9/3/2021  12:39 PM    Future Appointments   Date Time Provider Marc Ruvalcaba   9/8/2021 11:15 AM Climmie Counts [id number] [facility]   9/10/2021 12:00 PM Climmie Counts [id number] [facility]   9/20/2021 10:30 AM Climmie Counts [id number] [facility]   9/27/2021 10:30 AM Oacoma Rowan, OTR/L MMCPTPB [facility]   9/30/2021 10:30 AM Oacoma Rowan, OTR/L MMCPTPB [facility]   10/4/2021 10:30 AM Oacoma Rowan, OTR/L MMCPTPB [facility]   10/7/2021 10:30 AM Oacoma Rowan, OTR/L MMCPTPB [facility]   10/11/2021 10:30 AM Oacoma Rowan, OTR/L MMCPTPB [facility]   10/14/2021 10:30 AM Oacoma Rowan, OTR/L MMCPTPB [facility]   10/18/2021 10:30 AM Kimmy Rees, OTR/L [facility]CPTPB [facility]   11/29/2021  8:00 AM Mukesh Jarrett MD [facility]   8/30/2022 10:00 AM [facility] NURSE [facility]   9/6/2022 10:00 AM PRANAY Lieberman

## 2021-09-08 ENCOUNTER — HOSPITAL ENCOUNTER (OUTPATIENT)
Dept: PHYSICAL THERAPY | Age: 66
Discharge: HOME | End: 2021-09-08
Payer: MEDICARE

## 2021-09-08 PROCEDURE — 97110 THERAPEUTIC EXERCISES: CPT

## 2021-09-08 NOTE — PROGRESS NOTES
OT DAILY TREATMENT NOTE 10-18    Patient Name: Chato Moulton  Date:2021  : 1955  [x]  Patient  Verified  Payor: [place] MEDICARE / Plan: VA MEDICARE PART A & B / Product Type: Medicare /    In time:1115  Out time:1155  Total Treatment Time (min): 40  Visit #: 2 of 8    Medicare/BCBS Only   Total Timed Codes (min):  40 1:1 Treatment Time:  40     Treatment Area: Upper extremity weakness [R29.898]  Hemiplegia and hemiparesis following cerebral infarction affecting unspecified side [I69.359]    SUBJECTIVE  Pain Level (0-10 scale): 0/10  Any medication changes, allergies to medications, adverse drug reactions, diagnosis change, or new procedure performed?: [x] No    [] Yes (see summary sheet for update)  Subjective functional status/changes:   [] No changes reported  I have been practicing     OBJECTIVE    40 min Therapeutic Exercise:  [] See flow sheet :   Rationale: increase ROM and increase strength to improve the patients ability to reach     Supine shoulder abduction   Supine shoulder abd to flexion to side x 10  Sidelying shoulder flexion, hor abd x 10  Supine 1# dowel shoulder flex hor ab add and elbow flex  B Floor Stretch   Shrugs/ Retraction         With   [] TE   [] TA   [] neuro   [] other: Patient Education: [x] Review HEP    [] Progressed/Changed HEP based on:   [] positioning   [] body mechanics   [] transfers   [] heat/ice application   [] Splint wear/care   [] Sensory re-education   [] scar management      [] other:            Other Objective/Functional Measures:     Improved Volitional control of Right UE with 2 count breathes for each movement     Pain Level (0-10 scale) post treatment: 0/10    ASSESSMENT/Changes in Function: improving volitional control of Right UE    Patient will continue to benefit from skilled OT services to modify and progress therapeutic interventions, address ROM deficits, address strength deficits and instruct in home and community integration to attain remaining goals.     []  See Plan of Care  []  See progress note/recertification  []  See Discharge Summary         Progress towards goals / Updated goals:  3.  Patient will be able to pinch to  items with right hand   Status PN; Progressing, now able to complete removal portion of 9 hole peg test   9/3/21 able to pinch 1 inch dowels     Long Term Goals: To be accomplished in 4 weeks:              1.  Patient will be able to use right hand as assist in buttoning  Status PN; Progressing,  Able to use Right UE as functional assist for buttoning/unbuttoning      3.  Patient will be able to reach with right hand to turn on and off faucet.   Status PN; Progressing, albe to turn on  with lever faucet, not able to turn off     4.  Patient will be able to use right hand to assist in washing Face as shown by increase in FOTO of at least 5 points.     Status PN; not met, FOTO 47    PLAN  []  Upgrade activities as tolerated     [x]  Continue plan of care  []  Update interventions per flow sheet       []  Discharge due to:_  []  Other:_      JUSTIN Jensen/ROVERTO 9/8/2021  11:13 AM    Future Appointments   Date Time Provider Marc Ruvalcaba   9/8/2021 11:15 AM Celia Lints [id number] [facility]   9/10/2021 12:00 PM Celia Lints [id number] [facility]   9/20/2021 10:30 AM Celia Lints [id number] [facility]   9/27/2021 10:30 AM Monique Helling, OTR/L MMCPTPB [facility]   9/30/2021 10:30 AM Monique Helling, OTR/L MMCPTPB [facility]   10/4/2021 10:30 AM Monique Helling, OTR/L MMCPTPB [facility]   10/7/2021 10:30 AM Monique Helling, OTR/L MMCPTPB [facility]   10/11/2021 10:30 AM Omnique Helling, OTR/L MMCPTPB [facility]   10/14/2021 10:30 AM Monique Yessy, OTR/L MMCPTPB [facility]   10/18/2021 10:30 AM Monique Yessy, OTR/L MMCPTPB [facility]   11/29/2021  8:00 AM Bean Lee MD [facility]   8/30/2022 10:00 AM [facility] NURSE [address] [facility]   9/6/2022 10:00 AM Jesus Alberto Jenkins, [address]

## 2021-09-10 ENCOUNTER — HOSPITAL ENCOUNTER (OUTPATIENT)
Dept: PHYSICAL THERAPY | Age: 66
Discharge: HOME | End: 2021-09-10
Payer: MEDICARE

## 2021-09-10 PROCEDURE — 97110 THERAPEUTIC EXERCISES: CPT

## 2021-09-10 PROCEDURE — 97530 THERAPEUTIC ACTIVITIES: CPT

## 2021-09-10 NOTE — PROGRESS NOTES
OT DAILY TREATMENT NOTE 10-18    Patient Name: Louise Quinonez  Date:9/10/2021  : 1955  [x]  Patient  Verified  Payor: Edward Frye / Plan: [place] MEDICARE PART A & B / Product Type: Medicare /    In time:300  Out time:345  Total Treatment Time (min): 45  Visit #: 3 of 8    Medicare/BCBS Only   Total Timed Codes (min):  45 1:1 Treatment Time:  45     Treatment Area: Upper extremity weakness [R29.898]  Hemiplegia and hemiparesis following cerebral infarction affecting unspecified side [I69.359]    SUBJECTIVE  Pain Level (0-10 scale): 0/10  Any medication changes, allergies to medications, adverse drug reactions, diagnosis change, or new procedure performed?: [x] No    [] Yes (see summary sheet for update)  Subjective functional status/changes:   [] No changes reported  Patient will not be in town next week due to upcoming vacation     OBJECTIVE    15 min Therapeutic Exercise:  [] See flow sheet :   Rationale: increase ROM and increase strength to improve the patients ability to reach    Shrugs/Retraction  Towel Slides- into various planes while performing active grasp of washcloth at table level      30 min Therapeutic Activity:  []  See flow sheet :   Rationale: increase ROM and improve coordination  to improve the patients ability to grasp and release    Wash Cloth to face  Place and hold: Right UE- shoulder flexion- while grasping wash cloth while practicing breathing patterns   1 in wooden cube placement- 4x seated at table level, 5x in stand       With   [] TE   [] TA   [] neuro   [] other: Patient Education: [x] Review HEP    [] Progressed/Changed HEP based on:   [] positioning   [] body mechanics   [] transfers   [] heat/ice application   [] Splint wear/care   [] Sensory re-education   [] scar management      [] other:            Other Objective/Functional Measures:     Ongoing cueing to breathe during movement with Right UE  Improved carryover with slowing down pace and breaking task into steps      Pain Level (0-10 scale) post treatment: 0/10    ASSESSMENT/Changes in Function: correct breathing patterns improves functional use of hand     Patient will continue to benefit from skilled OT services to modify and progress therapeutic interventions, address ROM deficits, address strength deficits and instruct in home and community integration to attain remaining goals. []  See Plan of Care  []  See progress note/recertification  []  See Discharge Summary         Progress towards goals / Updated goals:  3.  Patient will be able to pinch to  items with right hand   Status PN; Progressing, now able to complete removal portion of 9 hole peg test   9/3/21 able to pinch 1 inch dowels     Long Term Goals: To be accomplished in 4 weeks:              1.  Patient will be able to use right hand as assist in buttoning  Status PN; Progressing,  Able to use Right UE as functional assist for buttoning/unbuttoning      3.  Patient will be able to reach with right hand to turn on and off faucet.   Status PN; Progressing, albe to turn on  with lever faucet, not able to turn off     4.  Patient will be able to use right hand to assist in washing Face as shown by increase in FOTO of at least 5 points.     Status PN; not met, FOTO 47    PLAN  []  Upgrade activities as tolerated     [x]  Continue plan of care  []  Update interventions per flow sheet       []  Discharge due to:_  []  Other:_      Sunday DARELL Carlos 9/10/2021  1:59 PM    Future Appointments   Date Time Provider Marc Ruvalcaba   9/10/2021  3:00 PM Maria Esther Loss MMCPTPB [facility]   9/20/2021 10:30 AM Maria Esther Loss NVCIODM [facility]   9/27/2021 10:30 AM Elspeth Heys, OTR/L MMCPTPB [facility]   9/30/2021 10:30 AM Elspeth Heys, OTR/L MMCPTPB [facility]   10/4/2021 10:30 AM Elspeth Heys, OTR/L MMCPTPB [facility]   10/7/2021 10:30 AM Elspeth Heys, OTR/L MMCPTPB [facility]   10/11/2021 10:30 AM Yaneli Clement, OTR/L MMCPTPB [facility]   10/14/2021 10:30 AM Yaneli Clement, OTR/L MMCPTPB [facility]   10/18/2021 10:30 AM Mac Beverage, OTR/L [facility]CPTPB [facility]   11/29/2021  8:00 AM Phuong Li MD [facility]   8/30/2022 10:00 AM [facility] NURSE NOAM FIORE [facility]   9/6/2022 10:00 AM PRANAY Sosa

## 2021-09-20 ENCOUNTER — HOSPITAL ENCOUNTER (OUTPATIENT)
Dept: PHYSICAL THERAPY | Age: 66
Discharge: HOME | End: 2021-09-20
Payer: MEDICARE

## 2021-09-20 PROCEDURE — 97112 NEUROMUSCULAR REEDUCATION: CPT

## 2021-09-20 PROCEDURE — 97530 THERAPEUTIC ACTIVITIES: CPT

## 2021-09-20 PROCEDURE — 97110 THERAPEUTIC EXERCISES: CPT

## 2021-09-20 NOTE — PROGRESS NOTES
OT DAILY TREATMENT NOTE 10-18    Patient Name: Alba Fisher  Date:2021  : 1955  [x]  Patient  Verified  Payor: VA MEDICARE / Plan: VA MEDICARE PART A & B / Product Type: Medicare /    In time:1030  Out time:1110  Total Treatment Time (min): 40  Visit #: 4 of 8    Medicare/BCBS Only   Total Timed Codes (min):  40 1:1 Treatment Time:  40     Treatment Area: Upper extremity weakness [R29.898]  Hemiplegia and hemiparesis following cerebral infarction affecting unspecified side [I69.359]    SUBJECTIVE  Pain Level (0-10 scale): 0/10  Any medication changes, allergies to medications, adverse drug reactions, diagnosis change, or new procedure performed?: [x] No    [] Yes (see summary sheet for update)  Subjective functional status/changes:   [] No changes reported  How come my thumb gets tight when I move my arm?     OBJECTIVE    12 min Therapeutic Exercise:  [] See flow sheet :   Rationale: increase ROM and increase strength to improve the patients ability to reach    Shrugs/Retraction: 2x10  BUE Floor Stretch 20x   PROM- Wrist Extension/Flexion     20 min Therapeutic Activity:  []  See flow sheet :   Rationale: increase ROM and improve coordination  to improve the patients ability to pinch, manipulate small items     Small Arc using Right UE, pinched rings with lateral pinch with assist from ANDERSON     Perfection: RIght UE with Left UE assist for moving Right UE  to pinch game pieces and remove from game board- untimed     8 min Neuromuscular Re-education:  []  See flow sheet :   Rationale: increase ROM, increase strength and improve coordination  to improve the patients ability to improve functional use of Right hand, improve self management of tone       Weightbearing into Right UE on Mat        With   [] TE   [] TA   [] neuro   [] other: Patient Education: [x] Review HEP    [] Progressed/Changed HEP based on:   [] positioning   [] body mechanics   [] transfers   [] heat/ice application   [] Splint wear/care   [] Sensory re-education   [] scar management      [] other:            Other Objective/Functional Measures:     Improved ability to volitionally control pinch with Left UE assist with Right UE movement      Pain Level (0-10 scale) post treatment: 0/10    ASSESSMENT/Changes in Function: volitional pinch improving     Patient will continue to benefit from skilled OT services to modify and progress therapeutic interventions, address ROM deficits, address strength deficits and instruct in home and community integration to attain remaining goals. []  See Plan of Care  []  See progress note/recertification  []  See Discharge Summary         Progress towards goals / Updated goals:  3.  Patient will be able to pinch to  items with right hand   Status PN; Progressing, now able to complete removal portion of 9 hole peg test   9/3/21 able to pinch 1 inch dowels     Long Term Goals: To be accomplished in 4 weeks:              1.  Patient will be able to use right hand as assist in buttoning  Status PN; Progressing,  Able to use Right UE as functional assist for buttoning/unbuttoning      3.  Patient will be able to reach with right hand to turn on and off faucet.   Status PN; Progressing, albe to turn on  with lever faucet, not able to turn off     4.  Patient will be able to use right hand to assist in washing Face as shown by increase in FOTO of at least 5 points.     Status PN; not met, FOTO 47    PLAN  []  Upgrade activities as tolerated     [x]  Continue plan of care  []  Update interventions per flow sheet       []  Discharge due to:_  []  Other:_      Primo Godoy ,ANDERSON/L 9/20/2021  10:37 AM    Future Appointments   Date Time Provider Marc Ruvalcaba   9/27/2021 10:30 AM Mauro Lopez OTR/L MMCPTPB [facility]   9/30/2021 10:30 AM Mauro Lopez OTR/L MMCPTPB [facility]   10/4/2021 10:30 AM Sita Lee GNVUNQY [facility]   10/7/2021 10:30 AM Mauro Lopez OTR/L MMCPTPB [facility]   10/11/2021 10:30 AM Mauro Lopez OTR/L MMCPTPB [facility]   10/14/2021 10:30 AM Faylene Graft, OTR/L MMCPTPB [facility]   10/18/2021 10:30 AM Faylene Graft, OTR/L MMCPTPB [facility]   11/29/2021  8:00 AM Loyd Lay MD [facility]   8/30/2022 10:00 AM [facility] NURSE [facility]   9/6/2022 10:00 AM PRANAY Naranjo

## 2021-09-27 ENCOUNTER — HOSPITAL ENCOUNTER (OUTPATIENT)
Dept: PHYSICAL THERAPY | Age: 66
Discharge: HOME | End: 2021-09-27
Payer: MEDICARE

## 2021-09-27 PROCEDURE — 97110 THERAPEUTIC EXERCISES: CPT

## 2021-09-27 PROCEDURE — 97530 THERAPEUTIC ACTIVITIES: CPT

## 2021-09-27 PROCEDURE — 97112 NEUROMUSCULAR REEDUCATION: CPT

## 2021-09-27 NOTE — PROGRESS NOTES
OT DAILY TREATMENT NOTE     Patient Name: Efraín Rothman  Date:2021  : 1955  [x]  Patient  Verified  Payor: VA MEDICARE / Plan: VA MEDICARE PART A & B / Product Type: Medicare /    In time:1030  Out time:1115  Total Treatment Time (min): 45  Visit #: 5 of 8    Medicare/BCBS Only   Total Timed Codes (min):  45 1:1 Treatment Time:  45     Treatment Area: Upper extremity weakness [R29.898]  Hemiplegia and hemiparesis following cerebral infarction affecting unspecified side [I69.359]    SUBJECTIVE  Pain Level (0-10 scale): 0/10  Any medication changes, allergies to medications, adverse drug reactions, diagnosis change, or new procedure performed?: [x] No    [] Yes (see summary sheet for update)  Subjective functional status/changes:   [] No changes reported  I practice with these at home ( foam cubes)    OBJECTIVE        15 min Therapeutic Exercise:  [] See flow sheet :   Rationale: increase ROM to improve the patients ability to grasp  ROM right wrist and hand  Towel slides on incline wedge     20 min Therapeutic Activity:  []  See flow sheet :   Rationale: increase ROM and improve coordination  to improve the patients ability to reach  Faucet management in kitchen and bathroom using right hand  1 inch cube  and place x 18 with OT providing stabilization  Foam cube  and place with right hand     10 min Neuromuscular Re-education:  []  See flow sheet :   Rationale: increase ROM  to improve the patients ability to tone control  Weight bearing with push ups right hand      With   [] TE   [] TA   [] neuro   [] other: Patient Education: [x] Review HEP    [] Progressed/Changed HEP based on: breathing  [] positioning   [] body mechanics   [] transfers   [] heat/ice application   [] Splint wear/care   [] Sensory re-education   [] scar management      [] other:            Other Objective/Functional Measures:   Able to  stabilized items with right hand  Able to operate bathroom faucet with right hand   Clonus in right hand today    Pain Level (0-10 scale) post treatment: 0/10    ASSESSMENT/Changes in Function: Improving hand function    Patient will continue to benefit from skilled OT services to modify and progress therapeutic interventions, address ROM deficits, address strength deficits, analyze and address soft tissue restrictions, analyze and cue movement patterns and instruct in home and community integration to attain remaining goals. []  See Plan of Care  []  See progress note/recertification  []  See Discharge Summary         Progress towards goals / Updated goals:  3.  Patient will be able to pinch to  items with right hand   Status PN; Progressing, now able to complete removal portion of 9 hole peg test   9/3/21 able to pinch 1 inch dowels  9/27/21 1 inch cubes, foam cubes     Long Term Goals: To be accomplished in 4 weeks:              1.  Patient will be able to use right hand as assist in buttoning  Status PN; Progressing,  Able to use Right UE as functional assist for buttoning/unbuttoning      3.  Patient will be able to reach with right hand to turn on and off faucet.   Status PN; Progressing, albe to turn on  with lever faucet, not able to turn off  9/27/21 able to complete task with max difficulty     4.  Patient will be able to use right hand to assist in washing Face as shown by increase in FOTO of at least 5 points.     Status PN; not met, FOTO 47    PLAN  []  Upgrade activities as tolerated     [x]  Continue plan of care  []  Update interventions per flow sheet       []  Discharge due to:_  []  Other:_      King Khalif OTR/L 9/27/2021  9:18 AM    Future Appointments   Date Time Provider Marc Ruvalcaba   9/27/2021 10:30 AM Carlos Rivera OTR/L MMCPTPB [facility]   9/30/2021 10:30 AM Carlos Rivera OTR/L MMCPTPB [facility]   10/4/2021 10:30 AM Dale JURADO [facility]   10/7/2021 10:30 AM Carlos Rivera OTR/L MMCPTPB [facility]   10/11/2021 10:30 AM Carlos Rivera, OTR/L MMCPTPB [facility] 10/14/2021 10:30 AM Omaira Polanco, OTR/L MMCPTPB [facility]   10/18/2021 10:30 AM Omaira Polanco, OTR/L MMCPTPB [facility]   11/29/2021  8:00 AM Louisa Alvarez MD [facility]   8/30/2022 10:00 AM [facility] NURSE NOAM LEAVITT   9/6/2022 10:00 AM PRANAY Gallagher

## 2021-09-30 ENCOUNTER — HOSPITAL ENCOUNTER (OUTPATIENT)
Dept: PHYSICAL THERAPY | Age: 66
Discharge: HOME | End: 2021-09-30
Payer: MEDICARE

## 2021-09-30 PROCEDURE — 97530 THERAPEUTIC ACTIVITIES: CPT

## 2021-09-30 PROCEDURE — 97110 THERAPEUTIC EXERCISES: CPT

## 2021-09-30 NOTE — PROGRESS NOTES
[facility]  [facility] [employer] OF [facility]IA[facility]  [address] [place] [address]  [phone] fax    Continued Plan of Care/ Re-certification for Occupational Therapy Services    Patient name: Mari Smart Start of Care: 21   Referral source: Rojelio Singh NP : 1955   Medical/Treatment Diagnosis: Upper extremity weakness [R29.898]  Hemiplegia and hemiparesis following cerebral infarction affecting unspecified side [I69.359]  Payor: VA MEDICARE / Plan: VA MEDICARE PART A & B / Product Type: Medicare /  Onset Date:2019      Prior Hospitalization: see medical history Provider#: [id number]   Medications: Verified on Patient Summary List     Comorbidities:  HTN, arthritis  Prior Level of Function:REtired from [employer], walking, 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, driving, [address] [place] [address]    Visits from Start of Care: 14    Missed Visits: 0    The Plan of Care and following information is based on the patient's current status:   Measurements: Taken with Samy Dynamometer, in Lbs   Level 2  Change   Right 10 15 16 +6   Left                   Pinch Measurements: Taken with Pinch Gauge, in Lbs   (hand)  Change   Lateral           Right 2 3 4 +2   Left                                      9 Hole       Right Unable to complete 2:38 (All out) *1:42  (All Out)  **3:43 (5 out)   Left         * Left UE assist while seated  ** Unassisted in stand                             FOTO      Ev   Score 48 47 49   Change           3.  Patient will be able to pinch to  items with right hand   Status PN; Progressing, now able to complete removal portion of 9 hole peg test   Current Status as of : Progressing, able to remove 5 pegs during 9 hole, in stand, unassisted      Long Term Goals: To be accomplished in 4 weeks:              1.  Patient will be able to use right hand as assist in buttoning  Status PN; Progressing,  Able to use Right UE as functional assist for buttoning/unbuttoning   Current Status as of 9/30: difficulty with bring hand up to self when attempting to button      3.  Patient will be able to reach with right hand to turn on and off faucet. Status PN; Progressing, albe to turn on  with lever faucet, not able to turn off  Current Status as of 9/30: able to complete task with max difficulty     4.  Patient will be able to use right hand to assist in washing Face as shown by increase in FOTO of at least 5 points.     Status PN; not met, FOTO 47  Current Status as of 9/30: 49 (+2)      Key functional changes: Slight improvement with /pinch strength, ongoing cueing to breathe required with movement/strength based tasks       Problems/ barriers to goal attainment: none     Treatment Plan: Therapeutic exercise, Therapeutic activities, Neuromuscular re-education, Manual therapy, Patient education and ADLs/IADLs      Patient Goal (s) has been updated and includes: No change in patient's personal goals at this time     Goals for this certification period to be accomplished in 4 weeks:    3.  Patient will be able to pinch to  items with right hand   Status PN 9/30: Progressing, able to remove 5 pegs during 9 hole, in stand, unassisted      Long Term Goals: To be accomplished in 4 weeks:              1.  Patient will be able to use right hand as assist in buttoning  Status PN 9/30: difficulty with bring hand up to self when attempting to button      3.  Patient will be able to reach with right hand to turn on and off faucet.   Status PN 9/30: able to complete task with max difficulty     4.  Patient will be able to use right hand to assist in washing Face as shown by increase in FOTO of at least 5 points.     Status PN 9/30: 49 (+2)    Frequency / Duration: Patient to be seen 2 times per week for 4 weeks:    Assessment / Recommendations:Patient will benefit from continued skilled occupational therapy to increase upper extremity function and functional independence. Certification Period: 10/1/21-10/30/21    DARELL eKith   9/30/2021 3:21 PM    ________________________________________________________________________  I certify that the above Therapy Services are being furnished while the patient is under my care. I agree with the treatment plan and certify that this therapy is necessary.       Physician's Signature:____________Date:_________TIME:________     Won Dooley NP  ** Signature, Date and Time must be completed for valid certification **      Please sign and return to [facility] [address]            [phone] fax

## 2021-09-30 NOTE — PROGRESS NOTES
OT DAILY TREATMENT NOTE 10-18    Patient Name: Laura Montenegro  Date:2021  : 1955  [x]  Patient  Verified  Payor: VA MEDICARE / Plan: VA MEDICARE PART A & B / Product Type: Medicare /    In time:1115  Out time:1200  Total Treatment Time (min): 45  Visit #: 6 of 8    Medicare/BCBS Only   Total Timed Codes (min):  45 1:1 Treatment Time:  39     Treatment Area: Upper extremity weakness [R29.898]  Hemiplegia and hemiparesis following cerebral infarction affecting unspecified side [I69.359]    SUBJECTIVE  Pain Level (0-10 scale): 0/10  Any medication changes, allergies to medications, adverse drug reactions, diagnosis change, or new procedure performed?: [x] No    [] Yes (see summary sheet for update)  Subjective functional status/changes:   [] No changes reported  On days that I have therapy I give my arm a rest     OBJECTIVE    25 min Therapeutic Exercise:  [] See flow sheet :   Rationale: increase ROM and increase strength to improve the patients ability to , pinch, reach     Recheck for PN  /Pinches  BUE Floor Reach   Shrugs/Retraction  Scap elevation with matched resistance       20 min Therapeutic Activity:  []  See flow sheet :   Rationale: increase ROM and improve coordination  to improve the patients ability to manipulate small items     Recheck for PN  FOTO  9 Hole      With   [] TE   [] TA   [] neuro   [] other: Patient Education: [x] Review HEP    [] Progressed/Changed HEP based on:   [] positioning   [] body mechanics   [] transfers   [] heat/ice application   [] Splint wear/care   [] Sensory re-education   [] scar management      [] other:            Other Objective/Functional Measures:      Measurements: Taken with Samy Dynamometer, in Lbs   Level 2   Eval  Change   Right 10 15 16 +6   Left              Pinch Measurements: Taken with Pinch Gauge, in Lbs   (hand)   Eval  Change   Lateral       Right 2 3 4 +2   Left                         9 Hole     Eval 8/31 9/30   Right Unable to complete 2:38 (All out) *1:42  (All Out)  **3:43 (5 out)   Left      * Left UE assist while seated  ** Unassisted in stand       FOTO   8/4  Eval 8/31 9/30   Score 48 47 49   Change          Pain Level (0-10 scale) post treatment: 0/10    ASSESSMENT/Changes in Function: Slight improvement with /pinch strength, ongoing cueing to breathe required with movement/strength based tasks     Patient will continue to benefit from skilled OT services to modify and progress therapeutic interventions, address ROM deficits, address strength deficits, analyze and cue movement patterns and instruct in home and community integration to attain remaining goals. []  See Plan of Care  []  See progress note/recertification  []  See Discharge Summary         Progress towards goals / Updated goals:  3.  Patient will be able to pinch to  items with right hand   Status PN; Progressing, now able to complete removal portion of 9 hole peg test   Current Status as of 9/30: Progressing, able to remove 5 pegs during 9 hole, in stand, unassisted      Long Term Goals: To be accomplished in 4 weeks:              1.  Patient will be able to use right hand as assist in buttoning  Status PN; Progressing,  Able to use Right UE as functional assist for buttoning/unbuttoning   Current Status as of 9/30: difficulty with bring hand up to self when attempting to button      3.  Patient will be able to reach with right hand to turn on and off faucet.   Status PN; Progressing, albe to turn on  with lever faucet, not able to turn off  Current Status as of 9/30: able to complete task with max difficulty     4.  Patient will be able to use right hand to assist in washing Face as shown by increase in FOTO of at least 5 points.     Status PN; not met, FOTO 47  Current Status as of 9/30: 49 (+2)    PLAN  []  Upgrade activities as tolerated     [x]  Continue plan of care  []  Update interventions per flow sheet       [] Discharge due to:_  []  Other:_      Darlin Kahlil ,ANDERSON/L 9/30/2021  11:20 AM    Future Appointments   Date Time Provider Marc Ruvalcaba   10/4/2021 10:30 AM Elizabeth Mouse IWSYKTF [facility]   10/7/2021 10:30 AM Brerubina Swann, OTR/L MMCPTPB [facility]   10/11/2021 10:30 AM Brendia Bicker, OTR/L MMCPTPB [facility]   10/14/2021 10:30 AM Brendia Bicker, OTR/L MMCPTPB [facility]   10/18/2021 10:30 AM Brerubina Johnstonker, OTR/L MMCPTPB [facility]   10/22/2021 10:30 AM Elizabeth Mouse JSAGGJF [facility]   10/25/2021 11:15 AM Elizabeth Mouse AIANBDU [facility]   10/28/2021 10:30 AM Elizabeth Mouse JYYRMCZ [facility]   11/1/2021 10:30 AM Miguel Angel Johnstonker, OTR/L MMCPTPB [facility]   11/4/2021 10:30 AM Elizabeth Mouse ONIXCCX [facility]   11/8/2021 10:30 AM Elizabeth Mouse XLHMCPF [facility]   11/11/2021 10:30 AM Elizabeth Mouse IFSZEVX [facility]   11/15/2021 10:30 AM Brendia Prestonker, OTR/L MMCPTPB [facility]   11/18/2021 10:30 AM Elizabeth Mouse ASCQFMV [facility]   11/22/2021 10:30 AM Brendia Bicker, OTR/L MMCPTPB [facility]   11/29/2021  8:00 AM Berkley Villanueva MD [facility]   8/30/2022 10:00 AM [facility] NURSE [facility] ADEBAYO [facility]   9/6/2022 10:00 AM PRANAY Galloway

## 2021-10-04 ENCOUNTER — HOSPITAL ENCOUNTER (OUTPATIENT)
Dept: PHYSICAL THERAPY | Age: 66
Discharge: HOME | End: 2021-10-04
Payer: MEDICARE

## 2021-10-04 PROCEDURE — 97535 SELF CARE MNGMENT TRAINING: CPT

## 2021-10-04 PROCEDURE — 97110 THERAPEUTIC EXERCISES: CPT

## 2021-10-04 PROCEDURE — 97530 THERAPEUTIC ACTIVITIES: CPT

## 2021-10-04 NOTE — PROGRESS NOTES
OT DAILY TREATMENT NOTE 10-18    Patient Name: Mari Smart  Date:10/4/2021  : 1955  [x]  Patient  Verified  Payor: VA MEDICARE / Plan: VA MEDICARE PART A & B / Product Type: Medicare /    In time:1030  Out time:1111  Total Treatment Time (min): 41  Visit #: 1 of 8    Medicare/BCBS Only   Total Timed Codes (min):  41 1:1 Treatment Time:  41     Treatment Area: Upper extremity weakness [R29.898]  Hemiplegia and hemiparesis following cerebral infarction affecting unspecified side [I69.359]    SUBJECTIVE  Pain Level (0-10 scale): 0/10  Any medication changes, allergies to medications, adverse drug reactions, diagnosis change, or new procedure performed?: [x] No    [] Yes (see summary sheet for update)  Subjective functional status/changes:   [] No changes reported  My wife will be out of town this week so I will be using this arm a lot     OBJECTIVE    10 min Therapeutic Exercise:  [] See flow sheet :   Rationale: increase ROM and increase strength to improve the patients ability to volitionally move Right Arm     PROM Wrist/Digit   Shrugs/Retraction     20 min Therapeutic Activity:  []  See flow sheet :   Rationale: increase ROM and improve coordination  to improve the patients ability to grasp/release     1 in wooden Blocks:    In Stand with Right hand placed 9 1 in wooden blocks   Seated: Right hand used to remove 1 in blocks placed at midline from table level to bring to right side and then release       11 min Self Care/Home Management: Button/Unbutton   Rationale: increase ROM, increase strength and improve coordination  to improve the patients ability to perform self care tasks with increased independence     Buttoning/Unbuttoning: with Right hand functional assist   BUE- Placed dress shirt on    Match and Ball Pairs of socks- BUE- Table level- Seated     With   [] TE   [] TA   [] neuro   [] other: Patient Education: [x] Review HEP    [] Progressed/Changed HEP based on:   [] positioning   [] body mechanics   [] transfers   [] heat/ice application   [] Splint wear/care   [] Sensory re-education   [] scar management      [] other:            Other Objective/Functional Measures:     Cueing to incorporate Right UE into ADL tasks      Pain Level (0-10 scale) post treatment: 0/10    ASSESSMENT/Changes in Function: improved volitional control of Right Thumb     Patient will continue to benefit from skilled OT services to modify and progress therapeutic interventions, address ROM deficits, address strength deficits and instruct in home and community integration to attain remaining goals. []  See Plan of Care  []  See progress note/recertification  []  See Discharge Summary         Progress towards goals / Updated goals:  3.  Patient will be able to pinch to  items with right hand   Status PN 9/30: Progressing, able to remove 5 pegs during 9 hole, in stand, unassisted   10/4: able to place and remove 1 in wooden blocks- in stand and seated      Long Term Goals: To be accomplished in 4 weeks:              1.  Patient will be able to use right hand as assist in buttoning  Status PN 9/30: difficulty with bring hand up to self when attempting to button   10/4: Progressing, encouragement required to incorporate right hand     3.  Patient will be able to reach with right hand to turn on and off faucet.   Status PN 9/30: able to complete task with max difficulty     4.  Patient will be able to use right hand to assist in washing Face as shown by increase in FOTO of at least 5 points.     Status PN 9/30: 49 (+2)    PLAN  []  Upgrade activities as tolerated     [x]  Continue plan of care  []  Update interventions per flow sheet       []  Discharge due to:_  []  Other:_      JUSTIN Culver/L 10/4/2021  10:31 AM    Future Appointments   Date Time Provider Marc Ruvalcaba   10/7/2021 10:30 AM Vira Cross OTR/L MMCPTPB [facility]   10/11/2021 10:30 AM Vira Cross OTR/L MMCPTPB [facility]   10/14/2021 10:30 AM Sylvain Harman Gonzalo Jalloh, OTR/L MMCPTPB [facility]   10/18/2021 10:30 AM Vanessa Billy, OTR/L MMCPTPB [facility]   10/22/2021 10:30 AM Katey Pettit TPQRNXO [facility]   10/25/2021 11:15 AM Katey Pettit VCFMJOQ [facility]   10/28/2021 10:30 AM Katey Pettit ZZIRVPM [facility]   11/1/2021 10:30 AM Vanessa Billy, OTR/L MMCPTPB [facility]   11/4/2021 10:30 AM Katey Pettit SVXRMAQ [facility]   11/8/2021 10:30 AM Katey Pettit HMXNAUC [facility]   11/11/2021  1:30 PM Katey Pettit OHJIHNM [facility]   11/15/2021 10:30 AM Vanessa Billy, OTR/L MMCPTPB [facility]   11/18/2021  3:00 PM Katey Pettit RDCEUKG [facility]   11/22/2021 10:30 AM Vanessa Billy, OTR/L MMCPTPB [facility]   11/29/2021  8:00 AM Hilary Reddy MD [facility] AM[facility]   8/30/2022 10:00 AM [facility] [facility] NURSE NOAM LEAVITT   9/6/2022 10:00 AM PRANAY Vilchis [address] [facility]

## 2021-10-07 ENCOUNTER — HOSPITAL ENCOUNTER (OUTPATIENT)
Dept: PHYSICAL THERAPY | Age: 66
Discharge: HOME | End: 2021-10-07
Payer: MEDICARE

## 2021-10-07 PROCEDURE — 97110 THERAPEUTIC EXERCISES: CPT

## 2021-10-07 PROCEDURE — 97530 THERAPEUTIC ACTIVITIES: CPT

## 2021-10-07 PROCEDURE — 97112 NEUROMUSCULAR REEDUCATION: CPT

## 2021-10-07 NOTE — PROGRESS NOTES
OT DAILY TREATMENT NOTE      Patient Name: Nivia Hardy  Date:10/7/2021  : 1955  [x]  Patient  Verified  Payor: VA MEDICARE / Plan: VA MEDICARE PART A & B / Product Type: Medicare /    In time:1030  Out time:1115  Total Treatment Time (min): 45  Visit #: 2 of 8    Medicare/BCBS Only   Total Timed Codes (min):  45 1:1 Treatment Time:  45     Treatment Area: Upper extremity weakness [R29.898]  Hemiplegia and hemiparesis following cerebral infarction affecting unspecified side [I69.359]    SUBJECTIVE  Pain Level (0-10 scale): 0/10  Any medication changes, allergies to medications, adverse drug reactions, diagnosis change, or new procedure performed?: [x] No    [] Yes (see summary sheet for update)  Subjective functional status/changes:   [] No changes reported  It is tired    OBJECTIVE   ad      20 min Therapeutic Exercise:  [] See flow sheet :   Rationale: increase ROM and increase strength to improve the patients ability to grasp  Floor reach x 15  Saebo tree batting balls off levels 1,2 5 each in sitting  Seated 4 band hand helper x 18 with stabilization of cube by OT    20 min Therapeutic Activity:  []  See flow sheet :   Rationale: increase ROM and improve coordination  to improve the patients ability to grasp  1 inch cubes grasp and place x 5 with lat pinch  1 inch dowel remove replace x 10 in standing  1 inch dowel remove drop on floor with right     10 min Neuromuscular Re-education:  []  See flow sheet :   Rationale: increase ROM  to improve the patients ability to use right UE  Weight bearing right UE on palm and elbow        With   [] TE   [] TA   [] neuro   [] other: Patient Education: [x] Review HEP    [] Progressed/Changed HEP based on:   [] positioning   [] body mechanics   [] transfers   [] heat/ice application   [] Splint wear/care   [] Sensory re-education   [] scar management      [] other:            Other Objective/Functional Measures:   Able to use lateral and 3 point pinch to retrieve items     Pain Level (0-10 scale) post treatment: 0/10    ASSESSMENT/Changes in Function: improving abduction of thumb for tasks    Patient will continue to benefit from skilled OT services to modify and progress therapeutic interventions, address ROM deficits, address strength deficits, analyze and address soft tissue restrictions and instruct in home and community integration to attain remaining goals. []  See Plan of Care  []  See progress note/recertification  []  See Discharge Summary         Progress towards goals / Updated goals:  3.  Patient will be able to pinch to  items with right hand   Status PN 9/30: Progressing, able to remove 5 pegs during 9 hole, in stand, unassisted   10/4: able to place and remove 1 in wooden blocks- in stand and seated   10/7 : Improving pinch, better in standing     Long Term Goals: To be accomplished in 4 weeks:              1.  Patient will be able to use right hand as assist in buttoning  Status PN 9/30: difficulty with bring hand up to self when attempting to button   10/4: Progressing, encouragement required to incorporate right hand      3.  Patient will be able to reach with right hand to turn on and off faucet.   Status PN 9/30: able to complete task with max difficulty  10/7 reaching on Saebo tree     4.  Patient will be able to use right hand to assist in washing Face as shown by increase in FOTO of at least 5 points.     Status PN 9/30: 49 (+2)    PLAN  []  Upgrade activities as tolerated     []  Continue plan of care  []  Update interventions per flow sheet       []  Discharge due to:_  []  Other:_      Aiyana Jansen OTR/L 10/7/2021  10:19 AM    Future Appointments   Date Time Provider Marc Ruvalcaba   10/7/2021 10:30 AM Nelda Peabody, OTR/L MMCPTPB [facility]   10/11/2021 10:30 AM Nelda Peabody, OTR/L MMCPTPB [facility]   10/14/2021 10:30 AM Nelda Peabody, OTR/L MMCPTPB [facility]   10/18/2021 10:30 AM Nelda Peabody, OTR/L MMCPTPB [facility]   10/22/2021 10:30 AM Lloyd Santiago ABDUHKP [facility]   10/25/2021 11:15 AM Ileana Dunn ORHOPTU [facility]   10/28/2021 10:30 AM Ileana Dunn ROXHQOW [facility]   11/1/2021 10:30 AM Claribel Knott, OTR/L MMCPTPB [facility]   11/4/2021 10:30 AM Ileana Dunn WYMYBLQ [facility]   11/8/2021 10:30 AM Ileana Dunn WKWXFCW [facility]   11/11/2021  1:30 PM Ileana Dunn YSGWGDS [facility]   11/15/2021 10:30 AM Claribel Knott, OTR/L MMCPTPB [facility]   11/18/2021  3:00 PM Ileana Dunn FNAZLBZ [facility]   11/22/2021 10:30 AM Claribel Knott, OTR/L MMCPTPB [facility]   11/29/2021  8:00 AM Yeni Daley MD [facility]   8/30/2022 10:00 AM [facility] NURSE NOAM LEAVITT   9/6/2022 10:00 AM PRANAY Fong

## 2021-10-11 ENCOUNTER — HOSPITAL ENCOUNTER (OUTPATIENT)
Dept: PHYSICAL THERAPY | Age: 66
Discharge: HOME | End: 2021-10-11
Payer: MEDICARE

## 2021-10-11 PROCEDURE — 97530 THERAPEUTIC ACTIVITIES: CPT

## 2021-10-11 PROCEDURE — 97110 THERAPEUTIC EXERCISES: CPT

## 2021-10-11 NOTE — PROGRESS NOTES
OT DAILY TREATMENT NOTE     Patient Name: Peña Palmer  Date:10/11/2021  : 1955  [x]  Patient  Verified  Payor: [place] MEDICARE / Plan: [place] MEDICARE PART A & B / Product Type: Medicare /    In time:1030  Out time:1115  Total Treatment Time (min): 45  Visit #: 3 of 8    Medicare/BCBS Only   Total Timed Codes (min):  45 1:1 Treatment Time:  45     Treatment Area: Upper extremity weakness [R29.898]  Hemiplegia and hemiparesis following cerebral infarction affecting unspecified side [I69.359]    SUBJECTIVE  Pain Level (0-10 scale): 0/10  Any medication changes, allergies to medications, adverse drug reactions, diagnosis change, or new procedure performed?: [x] No    [] Yes (see summary sheet for update)  Subjective functional status/changes:   [] No changes reported  It is hard for me to open my hand far enough    OBJECTIVE          15 min Therapeutic Exercise:  [] See flow sheet :   Rationale: increase ROM and increase strength to improve the patients ability to reach  Supine with stabilization for shoulder holding straight cane in right only  Seated floor reach x 10  Wrist PROm and weight bearing push up    30 min Therapeutic Activity:  []  See flow sheet :   Rationale: increase ROM  to improve the patients ability to grasp  Standing:  Varied item  and transfer from tray to tray with right only 17 items in 5 minutes  Stress ball transfer (non ball shapes) x 11         With   [] TE   [] TA   [] neuro   [] other: Patient Education: [x] Review HEP    [] Progressed/Changed HEP based on: grasp items at home  [] positioning   [] body mechanics   [] transfers   [] heat/ice application   [] Splint wear/care   [] Sensory re-education   [] scar management      [] other:            Other Objective/Functional Measures:   Able to  all items except large bolts     Pain Level (0-10 scale) post treatment: 0/10    ASSESSMENT/Changes in Function: improving digit extension for grasp    Patient will continue to benefit from skilled OT services to modify and progress therapeutic interventions, address ROM deficits, analyze and address soft tissue restrictions, analyze and cue movement patterns and instruct in home and community integration to attain remaining goals. []  See Plan of Care  []  See progress note/recertification  []  See Discharge Summary         Progress towards goals / Updated goals:  3.  Patient will be able to pinch to  items with right hand   Status PN 9/30: Progressing, able to remove 5 pegs during 9 hole, in stand, unassisted   10/4: able to place and remove 1 in wooden blocks- in stand and seated   10/7 : Improving pinch, better in standing  10/11/21 varied items  all but bolts     Long Term Goals: To be accomplished in 4 weeks:              1.  Patient will be able to use right hand as assist in buttoning  Status PN 9/30: difficulty with bring hand up to self when attempting to button   10/4: Progressing, encouragement required to incorporate right hand      3.  Patient will be able to reach with right hand to turn on and off faucet.   Status PN 9/30: able to complete task with max difficulty  10/7 reaching on Saebo tree     4.  Patient will be able to use right hand to assist in washing Face as shown by increase in FOTO of at least 5 points.     Status PN 9/30: 49 (+2)    PLAN  []  Upgrade activities as tolerated     []  Continue plan of care  []  Update interventions per flow sheet       []  Discharge due to:_  []  Other:_      Orvin Dancer, OTR/L 10/11/2021  8:13 AM    Future Appointments   Date Time Provider Marc Ruvalcaba   10/11/2021 10:30 AM Donaldo Exon, OTR/L MMCPTPB [facility]   10/14/2021 10:30 AM Donaldo Exon, OTR/L MMCPTPB [facility]   10/18/2021 10:30 AM Shelby Exon, OTR/L MMCPTPB [facility]   10/22/2021 10:30 [facility] ZZRYRWU [facility]   10/25/2021 11:15 [facility] NIHHBAR [facility]   10/28/2021 10:30 AM [facility]   11/1/2021 10:30 AM Donaldo Higgins OTR/L [id number] [facility]   11/4/2021 10:30 AM Katey Pettit NUXAJAT [facility]   11/8/2021 10:30 AM Katey Pettit XSERORA [facility]   11/11/2021  1:30 PM Katey Pettit NTFKBAM [facility]   11/15/2021 10:30 AM Vanessa Billy, OTR/L MMCPTPB [facility]   11/18/2021  3:00 PM Katey Pettit BWRHLXS [facility]   11/22/2021 10:30 AM Vanessa Billy, OTR/L MMCPTPB [facility]   11/29/2021  8:00 AM Hilary Reddy MD [facility]   8/30/2022 10:00 AM [facility] NURSE NOAM LEAVITT   9/6/2022 10:00 AM PRANAY Vilchis

## 2021-10-14 ENCOUNTER — HOSPITAL ENCOUNTER (OUTPATIENT)
Dept: PHYSICAL THERAPY | Age: 66
Discharge: HOME | End: 2021-10-14
Payer: MEDICARE

## 2021-10-14 PROCEDURE — 97530 THERAPEUTIC ACTIVITIES: CPT

## 2021-10-14 PROCEDURE — 97110 THERAPEUTIC EXERCISES: CPT

## 2021-10-14 NOTE — PROGRESS NOTES
OT DAILY TREATMENT NOTE     Patient Name: Ruperto Sauceda  Date:10/14/2021  : 1955  [x]  Patient  Verified  Payor: [place] MEDICARE / Plan: [place] MEDICARE PART A & B / Product Type: Medicare /    In time:1030  Out time:1115  Total Treatment Time (min): 45  Visit #: 4 of 8    Medicare/BCBS Only   Total Timed Codes (min):  45 1:1 Treatment Time:  45     Treatment Area: Upper extremity weakness [R29.898]  Hemiplegia and hemiparesis following cerebral infarction affecting unspecified side [I69.359]    SUBJECTIVE  Pain Level (0-10 scale): 0/10  Any medication changes, allergies to medications, adverse drug reactions, diagnosis change, or new procedure performed?: [x] No    [] Yes (see summary sheet for update)  Subjective functional status/changes:   [] No changes reported  Tried picking up medicine caps but trouble getting hand ot open that far    OBJECTIVE        10 min Therapeutic Exercise:  [] See flow sheet :   Rationale: increase ROM to improve the patients ability to reach  BUE floor reach and PROm wrist and digits    35 min Therapeutic Activity:  []  See flow sheet :   Rationale: increase ROM and improve coordination  to improve the patients ability to grasp  1 inch pegs remove from resistive pegboard x 10, replace x 1seated  Stress ball grasp and transfer in standing all but balls  Able to  bolt and nut and transfer tray to tray in standing       With   [] TE   [] TA   [] neuro   [] other: Patient Education: [x] Review HEP    [] Progressed/Changed HEP based on:   [] positioning   [] body mechanics   [] transfers   [] heat/ice application   [] Splint wear/care   [] Sensory re-education   [] scar management      [] other:            Other Objective/Functional Measures:   Able to remove 1 inch resistive pegs     Pain Level (0-10 scale) post treatment: 0/10    ASSESSMENT/Changes in Function: Improving hand funciton    Patient will continue to benefit from skilled OT services to modify and progress therapeutic interventions, address ROM deficits, address strength deficits, analyze and address soft tissue restrictions, analyze and cue movement patterns and instruct in home and community integration to attain remaining goals. []  See Plan of Care  []  See progress note/recertification  []  See Discharge Summary         Progress towards goals / Updated goals:  3.  Patient will be able to pinch to  items with right hand   Status PN 9/30: Progressing, able to remove 5 pegs during 9 hole, in stand, unassisted   10/4: able to place and remove 1 in wooden blocks- in stand and seated   10/7 : Improving pinch, better in standing  10/11/21 varied items  all but bolts  10/14/21 able to  bolts     Long Term Goals: To be accomplished in 4 weeks:              1.  Patient will be able to use right hand as assist in buttoning  Status PN 9/30: difficulty with bring hand up to self when attempting to button   10/4: Progressing, encouragement required to incorporate right hand      3.  Patient will be able to reach with right hand to turn on and off faucet.   Status PN 9/30: able to complete task with max difficulty  10/7 reaching on Saebo tree     4.  Patient will be able to use right hand to assist in washing Face as shown by increase in FOTO of at least 5 points.     Status PN 9/30: 49 (+2)    PLAN  []  Upgrade activities as tolerated     []  Continue plan of care  []  Update interventions per flow sheet       []  Discharge due to:_  []  Other:_      MALLIKA Wong/L 10/14/2021  10:26 AM    Future Appointments   Date Time Provider Marc Ruvalcaba   10/14/2021 10:30 AM Prince Garza OTR/ROVERTO MMCPTPB [facility]   10/18/2021 10:30 AM Prince Garza OTR/ROVERTO MMCPTPB [facility]   10/22/2021 10:30 AM John Parry LKJEOCV [facility]   10/25/2021 11:15 AM John Parry YPUZUIX [facility]   10/28/2021 10:30 AM John Parry GDGYEOB [facility]   11/1/2021 10:30 AM Prince Garza OTR/ROVERTO MMCPTPB [facility]   11/4/2021 10:30 AM Rudolph Rodriguez [facility] LIQTJNB [facility]   11/8/2021 10:30 AM Avnihermelinda Braun YBFHSPU [facility]   11/11/2021  1:30 PM Avnihermelinda Braun CADBNDI [facility]   11/15/2021 10:30 AM Audrene Juan C, OTR/L MMCPTPB [facility]   11/18/2021  3:00 PM Clayton Braun ETFYBYD [facility]   11/22/2021 10:30 AM Audrene Juan C, OTR/L MMCPTPB [facility]   11/29/2021  8:00 AM Cassie Leigh MD [facility]   8/30/2022 10:00 AM [facility] NURSE [facility] ADEBAYO [facility]   9/6/2022 10:00 AM PRANAY Bang [address]

## 2021-10-18 ENCOUNTER — HOSPITAL ENCOUNTER (OUTPATIENT)
Dept: PHYSICAL THERAPY | Age: 66
Discharge: HOME | End: 2021-10-18
Payer: MEDICARE

## 2021-10-18 PROCEDURE — 97530 THERAPEUTIC ACTIVITIES: CPT

## 2021-10-18 PROCEDURE — 97110 THERAPEUTIC EXERCISES: CPT

## 2021-10-18 PROCEDURE — 97535 SELF CARE MNGMENT TRAINING: CPT

## 2021-10-18 NOTE — PROGRESS NOTES
OT DAILY TREATMENT NOTE     Patient Name: Madhu Sherwood  Date:10/18/2021  : 1955  [x]  Patient  Verified  Payor: [place] MEDICARE / Plan: [place] MEDICARE PART A & B / Product Type: Medicare /    In time:1030  Out time:1115  Total Treatment Time (min): 45  Visit #: 5 of 8    Medicare/BCBS Only   Total Timed Codes (min):  45 1:1 Treatment Time:  45     Treatment Area: Upper extremity weakness [R29.898]  Hemiplegia and hemiparesis following cerebral infarction affecting unspecified side [I69.359]    SUBJECTIVE  Pain Level (0-10 scale): 0/10  Any medication changes, allergies to medications, adverse drug reactions, diagnosis change, or new procedure performed?: [x] No    [] Yes (see summary sheet for update)  Subjective functional status/changes:   [] No changes reported  I practiced picking things up over the weekend    OBJECTIVE    10 min Therapeutic Exercise:  [] See flow sheet :   Rationale: increase ROM and increase strength to improve the patients ability to grasp  Floor reach x 15 BUE  Right wrist and digit PROM  Weight bearing right palm with push up     20 min Therapeutic Activity:  []  See flow sheet :   Rationale: increase ROM and improve coordination  to improve the patients ability to grasp  1 inch cube lat pinch to  and place in standing x 9  Right hand varied items  and transfer from one tray in standing to another using lateral pinch         15 min Self Care/Home Management: manipulate items   Rationale: increase ROM and improve coordination  to improve the patients ability to use right as assist  Buttoning with shirt oriented as on self using right to stabilize and left to button with mod difficulty    With   [] TE   [] TA   [] neuro   [] other: Patient Education: [x] Review HEP    [] Progressed/Changed HEP based on: buttoning techniques  [] positioning   [] body mechanics   [] transfers   [] heat/ice application   [] Splint wear/care   [] Sensory re-education   [] scar management [] other:            Other Objective/Functional Measures:   Difficulty picking up fishing weight, heart ball and wing nut     Pain Level (0-10 scale) post treatment: 0/10    ASSESSMENT/Changes in Function: Improving accuracy of lateral pinch    Patient will continue to benefit from skilled OT services to modify and progress therapeutic interventions, address ROM deficits, address strength deficits, analyze and address soft tissue restrictions, analyze and cue movement patterns and instruct in home and community integration to attain remaining goals. []  See Plan of Care  []  See progress note/recertification  []  See Discharge Summary         Progress towards goals / Updated goals:  3.  Patient will be able to pinch to  items with right hand   Status PN 9/30: Progressing, able to remove 5 pegs during 9 hole, in stand, unassisted   10/4: able to place and remove 1 in wooden blocks- in stand and seated   10/7 : Improving pinch, better in standing  10/11/21 varied items  all but bolts  10/14/21 able to  bolts  10/18/21: difficulty picking up fishing weight and heart ball     Long Term Goals: To be accomplished in 4 weeks:              1.  Patient will be able to use right hand as assist in buttoning  Status PN 9/30: difficulty with bring hand up to self when attempting to button   10/4: Progressing, encouragement required to incorporate right hand   10/18 changed orientation to reflect shirt on him, able to use right as stabilizer     3.  Patient will be able to reach with right hand to turn on and off faucet.   Status PN 9/30: able to complete task with max difficulty  10/7 reaching on Saebo tree     4.  Patient will be able to use right hand to assist in washing Face as shown by increase in FOTO of at least 5 points.     Status PN 9/30: 49 (+2)    PLAN  []  Upgrade activities as tolerated     []  Continue plan of care  []  Update interventions per flow sheet       []  Discharge due to:_  []  Other:_      Alicia Savage, OTR/L 10/18/2021  8:20 AM    Future Appointments   Date Time Provider Marc Peg   10/18/2021 10:30 AM Jeff Roads, OTR/L MMCPTPB [facility]   10/21/2021  1:30 PM Jane Peto JJWSGUU [facility]   10/25/2021 11:15 AM Jane Peto QHLDKDH [facility]   10/28/2021 10:30 AM Jane Peto QZJANOK [facility]   11/1/2021 10:30 AM Jeff Roads, OTR/L MMCPTPB [facility]   11/4/2021 10:30 AM Jane Peto HVQEZZY [facility]   11/8/2021 10:30 AM Jane Peto THIOBKJ [facility]   11/11/2021  1:30 PM Jane Peto GZCEBMC [facility]   11/15/2021 10:30 AM Jeff Roads, OTR/L MMCPTPB [facility]   11/18/2021  3:00 PM Jane Peto RQXKYPJ [facility]   11/22/2021 10:30 AM Jeff Roads, OTR/L MMCPTPB [facility]   11/29/2021  8:00 AM Mey Castillo MD [facility] AMB   8/30/2022 10:00 AM [facility] NURSE NOAM LEAVITT   9/6/2022 10:00 AM PRANAY Landa [address] [facility]

## 2021-10-21 ENCOUNTER — HOSPITAL ENCOUNTER (OUTPATIENT)
Dept: PHYSICAL THERAPY | Age: 66
Discharge: HOME | End: 2021-10-21
Payer: MEDICARE

## 2021-10-21 PROCEDURE — 97110 THERAPEUTIC EXERCISES: CPT

## 2021-10-21 PROCEDURE — 97530 THERAPEUTIC ACTIVITIES: CPT

## 2021-10-21 PROCEDURE — 97535 SELF CARE MNGMENT TRAINING: CPT

## 2021-10-21 NOTE — PROGRESS NOTES
OT DAILY TREATMENT NOTE 10-18    Patient Name: Jesus Kim  Date:10/21/2021  : 1955  [x]  Patient  Verified  Payor: [place] MEDICARE / Plan: VA MEDICARE PART A & B / Product Type: Medicare /    In time:130  Out time:214  Total Treatment Time (min): 44  Visit #: 6 of 8    Medicare/BCBS Only   Total Timed Codes (min):  44 1:1 Treatment Time:  44     Treatment Area: Upper extremity weakness [R29.898]  Hemiplegia and hemiparesis following cerebral infarction affecting unspecified side [I69.359]    SUBJECTIVE  Pain Level (0-10 scale): 0/10  Any medication changes, allergies to medications, adverse drug reactions, diagnosis change, or new procedure performed?: [x] No    [] Yes (see summary sheet for update)  Subjective functional status/changes:   [] No changes reported  You are right, I do usually close my eyes when I am trying to use this arm.      OBJECTIVE    20 min Therapeutic Exercise:  [] See flow sheet :   Rationale: increase ROM and increase strength to improve the patients ability to move Shoulder, reach     Shrugs/Retraction: 2x 15 each   BUE Floor Reach 2x15   PROM: Wrist ROM     10 min Therapeutic Activity:  []  See flow sheet :   Rationale: increase ROM and improve coordination  to improve the patients ability to manip    In Stand:    1 in wooden blocks: removed from table at midline with use of dycem, using a modified lateral pinch to  then using forward flexion to place on tray 10x     14 min Self Care/Home Management:    Rationale: increase ROM, increase strength and improve coordination  to improve the patients ability to perform self     Review of Self Care with use of Right UE   BUE: Don/Doff hat   UB/LB bathing simulation with wash cloth in Right hand       With   [] TE   [] TA   [] neuro   [] other: Patient Education: [x] Review HEP    [] Progressed/Changed HEP based on:   [] positioning   [] body mechanics   [] transfers   [] heat/ice application   [] Splint wear/care   [] Sensory re-education   [] scar management      [] other:            Other Objective/Functional Measures:     Cueing to look at Right UE when performing tasks, ongoing cueing required for breathing patterns      Pain Level (0-10 scale) post treatment: 0/10    ASSESSMENT/Changes in Function: pinch improving     Patient will continue to benefit from skilled OT services to modify and progress therapeutic interventions, address ROM deficits, address strength deficits and instruct in home and community integration to attain remaining goals. []  See Plan of Care  []  See progress note/recertification  []  See Discharge Summary         Progress towards goals / Updated goals:  3.  Patient will be able to pinch to  items with right hand   Status PN 9/30: Progressing, able to remove 5 pegs during 9 hole, in stand, unassisted   10/18/21: difficulty picking up fishing weight and heart ball     Long Term Goals: To be accomplished in 4 weeks:              1.  Patient will be able to use right hand as assist in buttoning  Status PN 9/30: difficulty with bring hand up to self when attempting to button   10/18 changed orientation to reflect shirt on him, able to use right as stabilizer     3.  Patient will be able to reach with right hand to turn on and off faucet.   Status PN 9/30: able to complete task with max difficulty  10/7 reaching on Saebo tree     4.  Patient will be able to use right hand to assist in washing Face as shown by increase in FOTO of at least 5 points.     Status PN 9/30: 49 (+2)       PLAN  []  Upgrade activities as tolerated     [x]  Continue plan of care  []  Update interventions per flow sheet       []  Discharge due to:_  []  Other:_      JUSTIN Solo/ROVERTO 10/21/2021  1:38 PM    Future Appointments   Date Time Provider Marc Ruvalcaba   10/25/2021 11:15 AM Arlene Camera YOKPMEL [facility]   10/28/2021 10:30 AM Arlene Camera GYCXFXO [facility]   11/1/2021 10:30 AM Doneta Child, OTR/L MMCPTPB [facility] 11/4/2021 10:30 AM Cherelle Naas YFZLXNA [facility]   11/8/2021 10:30 AM Cherelle Naas XINDLRX [facility]   11/11/2021  1:30 PM Cherelle Taylors SOHLGCY [facility]   11/15/2021 10:30 AM Metro Galas, OTR/L MMCPTPB [facility]   11/18/2021  3:00 PM Cherelle Taylors CYTUEUM [facility]   11/22/2021 10:30 AM Metro Galas, OTR/L MMCPTPB [facility]   11/29/2021  8:00 AM Katerin Vargas MD [facility]   8/30/2022 10:00 AM [facility] NURSE NOAM LEAVITT   9/6/2022 10:00 AM PRANAY Senior

## 2021-10-22 ENCOUNTER — APPOINTMENT (OUTPATIENT)
Dept: PHYSICAL THERAPY | Age: 66
End: 2021-10-22
Payer: MEDICARE

## 2021-10-25 ENCOUNTER — HOSPITAL ENCOUNTER (OUTPATIENT)
Dept: PHYSICAL THERAPY | Age: 66
Discharge: HOME | End: 2021-10-25
Payer: MEDICARE

## 2021-10-25 PROCEDURE — 97110 THERAPEUTIC EXERCISES: CPT

## 2021-10-25 PROCEDURE — 97530 THERAPEUTIC ACTIVITIES: CPT

## 2021-10-25 PROCEDURE — 97535 SELF CARE MNGMENT TRAINING: CPT

## 2021-10-25 NOTE — PROGRESS NOTES
OT DAILY TREATMENT NOTE 10-18    Patient Name: Madhu Sherwood  Date:10/25/2021  : 1955  [x]  Patient  Verified  Payor: [place] MEDICARE / Plan: [place] MEDICARE PART A & B / Product Type: Medicare /    In time:1115  Out time:1155  Total Treatment Time (min): 40  Visit #: 7 of 8    Medicare/BCBS Only   Total Timed Codes (min):  40 1:1 Treatment Time:  40     Treatment Area: Upper extremity weakness [R29.898]  Hemiplegia and hemiparesis following cerebral infarction affecting unspecified side [I69.359]    SUBJECTIVE  Pain Level (0-10 scale): 0/10  Any medication changes, allergies to medications, adverse drug reactions, diagnosis change, or new procedure performed?: [x] No    [] Yes (see summary sheet for update)  Subjective functional status/changes:   [] No changes reported  Patient reports no pain in ankle (OTR questioned positioning of Right ankle during functional ambulation from waiting room to OT treatment area)    OBJECTIVE    15 min Therapeutic Exercise:  [] See flow sheet :   Rationale: increase ROM and increase strength to improve the patients ability to grasp, pinch, reach     PROM: Wrist Flexion/Extension   BUE Floor Reach 2x15  Shrugs/Retraction: 2x15    15 min Therapeutic Activity:  []  See flow sheet :   Rationale: increase ROM and improve coordination  to improve the patients ability to grasp/release small items     1 in cube stack with Right UE: blocks removed from Right side (on dycem) and place in square marker at midline 2 stacks of 3 cubes   Review of self managament with Tone    10 min Self Care/Home Management:    Rationale: increase ROM, increase strength and improve coordination  to improve the patients ability to perform functional self care tasks with increased independence     Hat don/Doff at table level   Buttoning- in stand at table level using Right hand as functional assist       With   [] TE   [] TA   [] neuro   [] other: Patient Education: [x] Review HEP    [] Progressed/Changed HEP based on:   [] positioning   [] body mechanics   [] transfers   [] heat/ice application   [] Splint wear/care   [] Sensory re-education   [] scar management      [] other:            Other Objective/Functional Measures:     Ongoing cueing to breath while performing movement with Right UE  Increased cueing to slow down movements and Segment steps in order to decrease fatigue and improve volitional control of Right UE  Prompting required for rest breaks as patient is unable to recognize independently when rest breaks are required/needed       Pain Level (0-10 scale) post treatment: 0/10    ASSESSMENT/Changes in Function: Increased fatigue noted with tasks, increased need for cueing     Patient will continue to benefit from skilled OT services to modify and progress therapeutic interventions, address ROM deficits, address strength deficits and instruct in home and community integration to attain remaining goals. []  See Plan of Care  []  See progress note/recertification  []  See Discharge Summary         Progress towards goals / Updated goals:  3.  Patient will be able to pinch to  items with right hand   Status PN 9/30: Progressing, able to remove 5 pegs during 9 hole, in stand, unassisted   10/18/21: difficulty picking up fishing weight and heart ball     Long Term Goals: To be accomplished in 4 weeks:              1.  Patient will be able to use right hand as assist in buttoning  Status PN 9/30: difficulty with bring hand up to self when attempting to button   10/18 changed orientation to reflect shirt on him, able to use right as stabilizer     3.  Patient will be able to reach with right hand to turn on and off faucet.   Status PN 9/30: able to complete task with max difficulty  10/7 reaching on Sa[place]o tree     4.  Patient will be able to use right hand to assist in washing Face as shown by increase in FOTO of at least 5 points.     Status PN 9/30: 49 (+2)    PLAN  []  Upgrade activities as tolerated [x]  Continue plan of care  []  Update interventions per flow sheet       []  Discharge due to:_  []  Other:_      OLE ZuñigaA/L 10/25/2021  8:33 AM    Future Appointments   Date Time Provider Marc Ruvalcaba   10/25/2021 11:15 AM Delories Agnieszka JBBHAZQ [facility]   10/28/2021 10:30 AM Delories Agnieszka CRUZ [facility]   11/1/2021 10:30 AM Kaleta Bane, OTR/L MMCPTPB [facility]   11/4/2021 10:30 AM Delories Agnieszka JOHAZWZ [facility]   11/8/2021 10:30 AM Delories Agnieszka GWWAHCJ [facility]   11/11/2021  1:30 PM Delories Agnieszka LEPIDFM [facility]   11/15/2021 10:30 AM Kaleta Bane, OTR/L MMCPTPB [facility]   11/18/2021  3:00 PM Terellories Agnieszka WZJROUC [facility]   11/22/2021 10:30 AM Kaleta Bane, OTR/L MMCPTPB [facility]   11/29/2021  8:00 AM Keturah Ruiz MD [facility]   8/30/2022 10:00 AM [facility] NURSE SAJAN ADEBAYO LEAVITT   9/6/2022 10:00 AM PRANAY La [address] [facility]

## 2021-10-28 ENCOUNTER — HOSPITAL ENCOUNTER (OUTPATIENT)
Dept: PHYSICAL THERAPY | Age: 66
Discharge: HOME | End: 2021-10-28
Payer: MEDICARE

## 2021-10-28 PROCEDURE — 97110 THERAPEUTIC EXERCISES: CPT

## 2021-10-28 PROCEDURE — 97530 THERAPEUTIC ACTIVITIES: CPT

## 2021-10-28 NOTE — PROGRESS NOTES
OT DAILY TREATMENT NOTE 10-18    Patient Name: Marta Show  Date:10/28/2021  : 1955  [x]  Patient  Verified  Payor: VA MEDICARE / Plan: VA MEDICARE PART A & B / Product Type: Medicare /    In time:1010  Out time:1050  Total Treatment Time (min): 40  Visit #: 8 of 8    Medicare/BCBS Only   Total Timed Codes (min):  40 1:1 Treatment Time:  40     Treatment Area: Upper extremity weakness [R29.898]  Hemiplegia and hemiparesis following cerebral infarction affecting unspecified side [I69.359]    SUBJECTIVE  Pain Level (0-10 scale): 0/10  Any medication changes, allergies to medications, adverse drug reactions, diagnosis change, or new procedure performed?: [x] No    [] Yes (see summary sheet for update)  Subjective functional status/changes:   [] No changes reported  How many more visits do you think I will get?     OBJECTIVE    25 min Therapeutic Exercise:  [] See flow sheet :   Rationale: increase ROM and increase strength to improve the patients ability to reach, , pinch     Recheck for PN  Shrugs/Retraction 2x15  BUE Floor Reach     15 min Therapeutic Activity:  []  See flow sheet :   Rationale: increase ROM and improve coordination  to improve the patients ability to perform functional daily tasks with improved ease    Recheck for PN  FOTO        With   [] TE   [] TA   [] neuro   [] other: Patient Education: [x] Review HEP    [] Progressed/Changed HEP based on:   [] positioning   [] body mechanics   [] transfers   [] heat/ice application   [] Splint wear/care   [] Sensory re-education   [] scar management      [] other:            Other Objective/Functional Measures:      Measurements: Taken with Samy Dynamometer, in Lbs   Level 2   Eval 8/31 9/30 10/28   Right 10 15 16 13   Left       +3            Pinch Measurements: Taken with Pinch Gauge, in Lbs   (hand)   Eval 8/31 9/30 10/28   Lateral          Right 2 3 4 4   Left                                      9 Hole      Eval  10/28   Right Unable to complete 2:38 (All out) *1:42  (All Out)    **3:43 (5 out) *1:20  (All Out)    **1:34   (5 Out)    **2:10  (All Out)   Left          * Left UE assist while seated  ** Unassisted in stand                             FOTO    8/4  Eval 8/31 9/30 10/28   Score 48 47 49 48   Change              Pain Level (0-10 scale) post treatment: 0/10    ASSESSMENT/Changes in Function: Improved fine motor in stand and sit, Functional use per FOTO remains unchanged, however patient is reporting improvement at home with use     Patient will continue to benefit from skilled OT services to modify and progress therapeutic interventions, address ROM deficits, address strength deficits and instruct in home and community integration to attain remaining goals. []  See Plan of Care  []  See progress note/recertification  []  See Discharge Summary         Progress towards goals / Updated goals:  3.  Patient will be able to pinch to  items with right hand   Status PN 9/30: Progressing, able to remove 5 pegs during 9 hole, in stand, unassisted   Current Status (10/28/2021): Progressing,  Able to perform 9 Hole with difficulty    Long Term Goals: To be accomplished in 4 weeks:              1.  Patient will be able to use right hand as assist in buttoning  Status PN 9/30: difficulty with bring hand up to self when attempting to button   Current Status (10/28/2021): Goal Met     3.  Patient will be able to reach with right hand to turn on and off faucet. Status PN 9/30: able to complete task with max difficulty  Current Status (10/28/2021):  Improvement per patient report, however patient reporting moderate difficulty with task     4.  Patient will be able to use right hand to assist in washing Face as shown by increase in FOTO of at least 5 points.     Status PN 9/30: 49 (+2)  Current Status (10/28/2021):48 (-1)      PLAN  []  Upgrade activities as tolerated     [x]  Continue plan of care  []  Update interventions per flow sheet       []  Discharge due to:_  []  Other:_      Saritha Marie ,ANDERSON/L 10/28/2021  9:15 AM    Future Appointments   Date Time Provider Marc Ruvalcaba   10/28/2021 10:30 AM Ida Dhillon DWBMIKH [facility]   11/1/2021 10:30 AM Shaan Lease, OTR/L MMCPTPB [facility]   11/4/2021 10:30 AM Ida Jacquie UVETFWY [facility]   11/8/2021 10:30 AM Ida Dhillon QIXGIQX [facility]   11/11/2021  1:30 PM Ida Dhillon [id number] [facility]   11/15/2021 10:30 AM Shaan Lease, OTR/L MMCPTPB [facility]   11/18/2021  3:00 PM Ida Dhillon ZJJNEZA [facility]   11/22/2021 10:30 AM Shaan Lease, OTR/L MMCPTPB [facility]   11/29/2021  8:00 AM Melba Holder MD [facility]   8/30/2022 10:00 AM [facility] NURSE NOAM LEAVITT   9/6/2022 10:00 AM PRANAY Durand

## 2021-10-28 NOTE — PROGRESS NOTES
[facility]  [employer] [facility]  [place]  [address] [place] [address]  [phone] fax    Continued Plan of Care/ Re-certification for Occupational Therapy Services    Patient name: Jesus Kim Start of Care: 21   Referral source: Georgia Lanza NP : 1955   Medical/Treatment Diagnosis: Upper extremity weakness [R29.898]  Hemiplegia and hemiparesis following cerebral infarction affecting unspecified side [I69.359]  Payor: VA MEDICARE / Plan: VA MEDICARE PART A & B / Product Type: Medicare /  Onset Date: 2019     Prior Hospitalization: see medical history Provider#: [id number]   Medications: Verified on Patient Summary List    Comorbidities: HTN, Arthritis   Prior Level of Function:REtired from [place], walking, collect Pez dispensers, driving, mow lawn     Visits from Start of Care: 22    Missed Visits: 0    The Plan of Care and following information is based on the patient's current status:     Measurements: Taken with Samy Dynamometer, in Lbs   Level 2 8/4  Eval 8/31 9/30 10/28   Right 10 15 16 13   Left       +3            Pinch Measurements: Taken with Pinch Gauge, in Lbs   (hand)   Eval 8/31 9/30 10/28   Lateral           Right 2 3 4 4   Left                                      9 Hole      Eval 8/31 9/30 10/28   Right Unable to complete 2:38 (All out) *1:42  (All Out)     **3:43 (5 out) *1:20  (All Out)     **1:34   (5 Out)     **2:10  (All Out)   Left           * Left UE assist while seated  ** Unassisted in stand                             FOTO      Eval 8/31 9/30 10/28   Score 48 47 49 48   Change               3.  Patient will be able to pinch to  items with right hand   Status PN : Progressing, able to remove 5 pegs during 9 hole, in stand, unassisted   Current Status (10/28/2021): Progressing,  Able to perform removal portion of 9 Hole with difficulty     Long Term Goals: To be accomplished in 4 weeks:              1.  Patient will be able to use right hand as assist in buttoning  Status PN 9/30: difficulty with bring hand up to self when attempting to button   Current Status (10/28/2021): Goal Met     3.  Patient will be able to reach with right hand to turn on and off faucet. Status PN 9/30: able to complete task with max difficulty  Current Status (10/28/2021): Improvement per patient report, however patient reporting moderate difficulty with task      4.  Patient will be able to use right hand to assist in washing Face as shown by increase in FOTO of at least 5 points.     Status PN 9/30: 49 (+2)  Current Status (10/28/2021):48 (-1)       Key functional changes:   Improved fine motor in stand and sit, Functional use per FOTO remains unchanged, however patient is reporting improvement at home with use     Problems/ barriers to goal attainment: none      Treatment Plan: Therapeutic exercise, Therapeutic activities, Neuromuscular re-education, Manual therapy, Patient education and ADLs/IADLs      Patient Goal (s) has been updated and includes: be able to open and close a zipper     Goals for this certification period to be accomplished in 4 weeks:    STG 3.  Patient will be able to pinch to  items with right hand   Status PN (10/28/2021): Progressing,  Able to perform removal portion of 9 Hole with difficulty     LTG 3.  Patient will be able to reach with right hand to turn on and off faucet. Status PN  (10/28/2021): Improvement per patient report, however patient reporting moderate difficulty with task      LTG 4.  Patient will be able to use right hand to assist in washing Face as shown by increase in FOTO of at least 5 points.     Status PN (10/28/2021): 48 (-1)    Added:  LTG 5. Patient will be demonstrate ability to open/close zipper with Right Hand as functional use for increased independence with don/doffing winter jacket.   Status at PN: (10/28/2021): unable per patient report        Frequency / Duration: Patient to be seen 2 times per week for 4 weeks:    Assessment / Recommendations: Patient will benefit from continued skilled occupational therapy to increase upper extremity function and functional independence. Certification Period: 10/31/21- 11/28/21    DARELL Mcgill  10/28/2021 10:56 AM    ________________________________________________________________________  I certify that the above Therapy Services are being furnished while the patient is under my care. I agree with the treatment plan and certify that this therapy is necessary.       Physician's Signature:____________Date:_________TIME:________     Elise Mcfarlane NP  ** Signature, Date and Time must be completed for valid certification **      Please sign and return to [facility] [address]            [phone] fax

## 2021-11-01 ENCOUNTER — HOSPITAL ENCOUNTER (OUTPATIENT)
Dept: PHYSICAL THERAPY | Age: 66
Discharge: HOME | End: 2021-11-01
Payer: MEDICARE

## 2021-11-01 PROCEDURE — 97530 THERAPEUTIC ACTIVITIES: CPT

## 2021-11-01 PROCEDURE — 97110 THERAPEUTIC EXERCISES: CPT

## 2021-11-04 ENCOUNTER — APPOINTMENT (OUTPATIENT)
Dept: PHYSICAL THERAPY | Age: 66
End: 2021-11-04
Payer: MEDICARE

## 2021-11-08 ENCOUNTER — HOSPITAL ENCOUNTER (OUTPATIENT)
Dept: PHYSICAL THERAPY | Age: 66
Discharge: HOME | End: 2021-11-08
Payer: MEDICARE

## 2021-11-08 PROCEDURE — 97110 THERAPEUTIC EXERCISES: CPT

## 2021-11-08 NOTE — PROGRESS NOTES
OT DAILY TREATMENT NOTE 10-18    Patient Name: Josey Saldivar  Date:2021  : 1955  [x]  Patient  Verified  Payor: VA MEDICARE / Plan: VA MEDICARE PART A & B / Product Type: Medicare /    In time:1030  Out time:1115  Total Treatment Time (min): 45  Visit #: 2 of 8    Medicare/BCBS Only   Total Timed Codes (min):  45 1:1 Treatment Time:  45     Treatment Area: Upper extremity weakness [R29.898]  Hemiplegia and hemiparesis following cerebral infarction affecting unspecified side [I69.359]    SUBJECTIVE  Pain Level (0-10 scale): 0/10  Any medication changes, allergies to medications, adverse drug reactions, diagnosis change, or new procedure performed?: [x] No    [] Yes (see summary sheet for update)  Subjective functional status/changes:   [] No changes reported  Not doing a whole lot, been staying inside since its been so cold.      OBJECTIVE    45 min Therapeutic Exercise:  [] See flow sheet :   Rationale: increase ROM and increase strength to improve the patients ability to reach     BUE Floor Stretch   Shrugs/Retraction   1# Dowel: Bicep Curls- 15x with assist with hand position using wrap   Place and Hold: on table- Elbow Flexion   2# Graded Clothes Pins: Seated, Right UE- dowel tree           With   [] TE   [] TA   [] neuro   [] other: Patient Education: [x] Review HEP    [] Progressed/Changed HEP based on:   [] positioning   [] body mechanics   [] transfers   [] heat/ice application   [] Splint wear/care   [] Sensory re-education   [] scar management      [] other:            Other Objective/Functional Measures:     Ongoing Cueing to look at Right UE when performing tasks    Cueing to slow down movement patters and break dynamic movement tasks into small steps to decrease fatigue/frustration         Pain Level (0-10 scale) post treatment: 0/10    ASSESSMENT/Changes in Function: difficulty with elbow flexion    Patient will continue to benefit from skilled OT services to modify and progress therapeutic interventions, address ROM deficits, address strength deficits, analyze and cue movement patterns and instruct in home and community integration to attain remaining goals. []  See Plan of Care  []  See progress note/recertification  []  See Discharge Summary         Progress towards goals / Updated goals:  Deepika Mendoza will be able to pinch to  items with right hand   Status PN (10/28/2021): Progressing,  Able to perform removal portion [facility] CTR with difficulty     LTG 3.  Patient will be able to reach with right hand to turn on and off faucet. Status PN  (10/28/2021): Improvement per patient report, however patient reporting moderate difficulty with task      LTG 4.  Patient will be able to use right hand to assist in washing Face as shown by increase in FOTO of at least 5 points.     Status PN (10/28/2021): 48 (-1)  Current Status (11/8/2021):-added place and hold elbow flexion and dowel elbow flexion to improve biceps strength     Added:  LTG 5. Patient will be demonstrate ability to open/close zipper with Right Hand as functional use for increased independence with don/doffing winter jacket.   Status at PN: (10/28/2021): unable per patient report      PLAN  []  Upgrade activities as tolerated     [x]  Continue plan of care  []  Update interventions per flow sheet       []  Discharge due to:_  []  Other:_       DARELL Mendoza 11/8/2021  9:08 AM    Future Appointments   Date Time Provider Marc Ruvalcaba   11/8/2021 10:30 AM Diane Roe [id number] [address]   11/11/2021  1:30 PM Diane Roe IAWCYWL [address]   11/15/2021 10:30 AM Tally [place], OTR/L MMCPTPB [address]   11/18/2021  3:00 PM Diane Roe MQPJKHT [address]   11/22/2021 10:30 AM Tally [place], OTR/L MMCPTPB [address]   11/29/2021  8:00 AM Basil Bates MD [facility]   11/29/2021 11:15 AM Diane Roe BNYAYYA [address]   8/30/2022 10:00 AM [address] [facility]   9/6/2022 10:00 AM PRANAY Leach Beck

## 2021-11-11 ENCOUNTER — HOSPITAL ENCOUNTER (OUTPATIENT)
Dept: PHYSICAL THERAPY | Age: 66
Discharge: HOME | End: 2021-11-11
Payer: MEDICARE

## 2021-11-11 PROCEDURE — 97110 THERAPEUTIC EXERCISES: CPT

## 2021-11-11 PROCEDURE — 97535 SELF CARE MNGMENT TRAINING: CPT

## 2021-11-11 PROCEDURE — 97530 THERAPEUTIC ACTIVITIES: CPT

## 2021-11-11 NOTE — PROGRESS NOTES
OT DAILY TREATMENT NOTE 10-18    Patient Name: Nivia Hardy  Date:2021  : 1955  [x]  Patient  Verified  Payor: VA MEDICARE / Plan: VA MEDICARE PART A & B / Product Type: Medicare /    In time:130  Out time:209  Total Treatment Time (min): 39  Visit #: 3 of 8    Medicare/BCBS Only   Total Timed Codes (min):  39 1:1 Treatment Time:  39     Treatment Area: Upper extremity weakness [R29.898]  Hemiplegia and hemiparesis following cerebral infarction affecting unspecified side [I69.359]    SUBJECTIVE  Pain Level (0-10 scale): 0/10  Any medication changes, allergies to medications, adverse drug reactions, diagnosis change, or new procedure performed?: [x] No    [] Yes (see summary sheet for update)  Subjective functional status/changes:   [] No changes reported  It seems a lot harder to do this siting down.  (Grasp/Release)     OBJECTIVE    9 min Therapeutic Exercise:  [] See flow sheet :   Rationale: increase ROM and increase strength to improve the patients ability to , grasp, reach     Shrugs/Retraction: 2x 15  B Floor Reach: 2 x 15     20 min Therapeutic Activity:  []  See flow sheet :   Rationale: increase ROM and improve coordination  to improve the patients ability to manipulate small items, grasp/release items     1 in cubes:   Seated: 9x -Max Difficulty    In Stand: 9x- More use of momentum vs volitional control of hand     10 min Self Care/Home Management:    Rationale: increase ROM, increase strength and improve coordination  to improve the patients ability to perform self care tasks with increased independence     Simulated: turning on water faucet -seated and in stand      With   [] TE   [] TA   [] neuro   [] other: Patient Education: [x] Review HEP    [] Progressed/Changed HEP based on:   [] positioning   [] body mechanics   [] transfers   [] heat/ice application   [] Splint wear/care   [] Sensory re-education   [] scar management      [] other:            Other Objective/Functional Measures:     Ongoing cueing required to slow down movements and perform tasks in steps      Pain Level (0-10 scale) post treatment: 0/10    ASSESSMENT/Changes in Function: able to use Right UE to simulate turning on/off water faucet     Patient will continue to benefit from skilled OT services to modify and progress therapeutic interventions, address ROM deficits, address strength deficits and instruct in home and community integration to attain remaining goals. []  See Plan of Care  []  See progress note/recertification  []  See Discharge Summary         Progress towards goals / Updated goals:  Nellie Landau will be able to pinch to  items with right hand   Status PN (10/28/2021): Progressing,  Able to perform removal portion [facility] CTR with difficulty     LTG 3.  Patient will be able to reach with right hand to turn on and off faucet. Status PN  (10/28/2021): Improvement per patient report, however patient reporting moderate difficulty with task   Current Status (11/11/2021): able to simulate in clinic     Desire Mckeon will be able to use right hand to assist in washing Face as shown by increase in FOTO of at least 5 points.     Status PN (10/28/2021): 48 (-1)  Current Status (11/8/2021):-added place and hold elbow flexion and dowel elbow flexion to improve biceps strength     Added:  LTG 5. Patient will be demonstrate ability to open/close zipper with Right Hand as functional use for increased independence with don/doffing winter jacket.   Status at PN: (10/28/2021): unable per patient report      PLAN  []  Upgrade activities as tolerated     [x]  Continue plan of care  []  Update interventions per flow sheet       []  Discharge due to:_  []  Other:_      DARELL Márquez 11/11/2021  9:22 AM    Future Appointments   Date Time Provider Marc Ruvalcaba   11/11/2021  1:30 PM Yola Muse [id number] [facility]   11/15/2021 10:30 AM MALLIKA Bolanos/L MMCPTPB [facility]   11/18/2021  3:00 PM Kristan Mitra Story [id number] [facility]   11/22/2021 10:30 AM Christiano Poe OTR/L MMCPTPB [facility]   11/29/2021  8:00 AM Mansi García MD [facility]   11/29/2021  2:15 PM Hermelinda Murray [id number] [facility]   8/30/2022 10:00 AM Allen Monteiro   9/6/2022 10:00 AM PRANAY Aparicio

## 2021-11-15 ENCOUNTER — HOSPITAL ENCOUNTER (OUTPATIENT)
Dept: PHYSICAL THERAPY | Age: 66
Discharge: HOME | End: 2021-11-15
Payer: MEDICARE

## 2021-11-15 PROCEDURE — 97530 THERAPEUTIC ACTIVITIES: CPT

## 2021-11-15 PROCEDURE — 97110 THERAPEUTIC EXERCISES: CPT

## 2021-11-15 NOTE — PROGRESS NOTES
OT DAILY TREATMENT NOTE 10-18    Patient Name: Sami Montanez  Date:11/15/2021  : 1955  [x]  Patient  Verified  Payor: [place] MEDICARE / Plan: VA MEDICARE PART A & B / Product Type: Medicare /    In time:1030  Out time:1110  Total Treatment Time (min): 40  Visit #: 4 of 8    Medicare/BCBS Only   Total Timed Codes (min):  40 1:1 Treatment Time:  40     Treatment Area: Upper extremity weakness [R29.898]  Hemiplegia and hemiparesis following cerebral infarction affecting unspecified side [I69.359]    SUBJECTIVE  Pain Level (0-10 scale): 0/10  Any medication changes, allergies to medications, adverse drug reactions, diagnosis change, or new procedure performed?: [x] No    [] Yes (see summary sheet for update)  Subjective functional status/changes:   [] No changes reported  Can I do this standing?     OBJECTIVE    10 min Therapeutic Exercise:  [] See flow sheet :   Rationale: increase ROM and increase strength to improve the patients ability to reach    Shrugs/Retraction/ Floor Reach: 2x 15    30 min Therapeutic Activity:  []  See flow sheet :   Rationale: increase ROM and improve coordination  to improve the patients ability to manipulate small items, grasp/release    1 in peg Removal:   Seated/In Stand: 10x each   1 in wooden block Stack: Standing: 3 stacks of 3 blocks     With   [] TE   [] TA   [] neuro   [] other: Patient Education: [x] Review HEP    [] Progressed/Changed HEP based on:   [] positioning   [] body mechanics   [] transfers   [] heat/ice application   [] Splint wear/care   [] Sensory re-education   [] scar management      [] other:            Other Objective/Functional Measures:     unable to place 1 in pegs     Ongoing difficulty performing tasks while keeping eyes open, cueing to not hold breathe        Pain Level (0-10 scale) post treatment: 0/10    ASSESSMENT/Changes in Function: able to remove pegs only     Patient will continue to benefit from skilled OT services to modify and progress therapeutic interventions, address ROM deficits, address strength deficits and instruct in home and community integration to attain remaining goals. []  See Plan of Care  []  See progress note/recertification  []  See Discharge Summary         Progress towards goals / Updated goals:  Korey Del Cid will be able to pinch to  items with right hand   Status PN (10/28/2021): Progressing,  Able to perform removal portion [facility] with difficulty     LTG 3.  Patient will be able to reach with right hand to turn on and off faucet. Status PN  (10/28/2021): Improvement per patient report, however patient reporting moderate difficulty with task   Current Status (11/11/2021): able to simulate in clinic      Leonela Ray will be able to use right hand to assist in washing Face as shown by increase in FOTO of at least 5 points.     Status PN (10/28/2021): 48 (-1)  Current Status (11/8/2021):-added place and hold elbow flexion and dowel elbow flexion to improve biceps strength     Added:  LTG 5. Patient will be demonstrate ability to open/close zipper with Right Hand as functional use for increased independence with don/doffing winter jacket.   Status at PN: (10/28/2021): unable per patient report         PLAN  []  Upgrade activities as tolerated     [x]  Continue plan of care  []  Update interventions per flow sheet       []  Discharge due to:_  []  Other:_      DARELL Hood 11/15/2021  8:34 AM    Future Appointments   Date Time Provider Marc Ruvalcaba   11/15/2021 10:30 AM Manuel Roman [id number] [facility]   11/18/2021  3:00 PM Manuel Roman ENTPQBS [facility]   11/22/2021 10:30 AM RICHELLE Chavez [id number] [facility]   11/29/2021  8:00 AM Severiano Gear, MD [facility]   12/2/2021 11:15 AM Manuel Roman [id number] [facility]   8/30/2022 10:00 AM Allen Monteiro   9/6/2022 10:00 AM Deeanna Angelucci, PA [place]

## 2021-11-18 ENCOUNTER — HOSPITAL ENCOUNTER (OUTPATIENT)
Dept: PHYSICAL THERAPY | Age: 66
Discharge: HOME | End: 2021-11-18
Payer: MEDICARE

## 2021-11-18 PROCEDURE — 97110 THERAPEUTIC EXERCISES: CPT

## 2021-11-18 PROCEDURE — 97530 THERAPEUTIC ACTIVITIES: CPT

## 2021-11-18 NOTE — PROGRESS NOTES
OT DAILY TREATMENT NOTE 10-18    Patient Name: Drea Hernandez  Date:2021  : 1955  [x]  Patient  Verified  Payor: VA MEDICARE / Plan: VA MEDICARE PART A & B / Product Type: Medicare /    In time:300  Out time:345  Total Treatment Time (min): 45  Visit #: 5 of 8    Medicare/BCBS Only   Total Timed Codes (min):  45 1:1 Treatment Time:  45     Treatment Area: Upper extremity weakness [R29.898]  Hemiplegia and hemiparesis following cerebral infarction affecting unspecified side [I69.359]    SUBJECTIVE  Pain Level (0-10 scale): 0/10  Any medication changes, allergies to medications, adverse drug reactions, diagnosis change, or new procedure performed?: [x] No    [] Yes (see summary sheet for update)  Subjective functional status/changes:   [] No changes reported  I am a little tired today.  I've been busy this morning     OBJECTIVE  25 min Therapeutic Exercise:  [] See flow sheet :   Rationale: increase ROM and increase strength to improve the patients ability to reach     Shrugs/Retraction/BUE Floor Reach 2x15   1# Dowel:   Bench Press: 2x 10    Bicep Curl: 2x 10- band to help keep Right hand on dowel     20 min Therapeutic Activity:  []  See flow sheet :   Rationale: increase ROM and improve coordination  to improve the patients ability to grasp/release       1 in wooden cube stack using Right hand in stand 2 stacks of 3  Varied Item : 5 Mins    With   [] TE   [] TA   [] neuro   [] other: Patient Education: [x] Review HEP    [] Progressed/Changed HEP based on:   [] positioning   [] body mechanics   [] transfers   [] heat/ice application   [] Splint wear/care   [] Sensory re-education   [] scar management      [] other:            Other Objective/Functional Measures:     Varied Item : 5 Mins: 10 items      Pain Level (0-10 scale) post treatment: 0/10    ASSESSMENT/Changes in Function: fatigue noted on this date, ongoing cueing to breathe, keep eyes open and look at Right UE when performing tasks, increased functional pinch when in stand vs seated, continue plan for DC at end of current script    Patient will continue to benefit from skilled OT services to modify and progress therapeutic interventions, address ROM deficits, address strength deficits and instruct in home and community integration to attain remaining goals. []  See Plan of Care  []  See progress note/recertification  []  See Discharge Summary         Progress towards goals / Updated goals:  Mason Dinh will be able to pinch to  items with right hand   Status PN (10/28/2021): Progressing,  Able to perform removal portion [facility] with difficulty     LTG 3.  Patient will be able to reach with right hand to turn on and off faucet. Status PN  (10/28/2021): Improvement per patient report, however patient reporting moderate difficulty with task   Current Status (11/11/2021): able to simulate in clinic      Dee Dee Curry will be able to use right hand to assist in washing Face as shown by increase in FOTO of at least 5 points.     Status PN (10/28/2021): 48 (-1)  Current Status (11/8/2021):-added place and hold elbow flexion and dowel elbow flexion to improve biceps strength     Added:  LTG 5. Patient will be demonstrate ability to open/close zipper with Right Hand as functional use for increased independence with don/doffing winter jacket. Status at PN: (10/28/2021): unable per patient    Current Status (11/18/2021):  Improving per patient report       PLAN  []  Upgrade activities as tolerated     [x]  Continue plan of care  []  Update interventions per flow sheet       []  Discharge due to:_  []  Other:_      DARELL Wray 11/18/2021  11:49 AM    Future Appointments   Date Time Provider Marc Ruvalcaba   11/22/2021 10:30 AM RICHELLE Dumas [id number] [facility]   11/29/2021  8:00 AM Price Macedo MD [facility] AM[facility]   12/2/2021 11:15 AM Augustina Mari [id number] [facility]   8/30/2022 10:00 AM [facility] NURSE Remigio Abdalla ADEBAYO LEAVITT   9/6/2022 10:00 AM PRANAY Horvath [address] [facility]

## 2021-11-22 ENCOUNTER — HOSPITAL ENCOUNTER (OUTPATIENT)
Dept: PHYSICAL THERAPY | Age: 66
Discharge: HOME | End: 2021-11-22
Payer: MEDICARE

## 2021-11-22 PROCEDURE — 97110 THERAPEUTIC EXERCISES: CPT

## 2021-11-22 PROCEDURE — 97530 THERAPEUTIC ACTIVITIES: CPT

## 2021-11-22 NOTE — PROGRESS NOTES
OT DAILY TREATMENT NOTE     Patient Name: Tawanna Ours  Date:2021  : 1955  [x]  Patient  Verified  Payor: VA MEDICARE / Plan: VA MEDICARE PART A & B / Product Type: Medicare /    In time:1030  Out time:1115  Total Treatment Time (min): 45  Visit #: 6 of 8    Medicare/BCBS Only   Total Timed Codes (min):  45 1:1 Treatment Time:  39     Treatment Area: Upper extremity weakness [R29.898]  Hemiplegia and hemiparesis following cerebral infarction affecting unspecified side [I69.359]    SUBJECTIVE  Pain Level (0-10 scale): 0/10  Any medication changes, allergies to medications, adverse drug reactions, diagnosis change, or new procedure performed?: [x] No    [] Yes (see summary sheet for update)  Subjective functional status/changes:   [] No changes reported  Wife still helping with jacket    OBJECTIVE        30 min Therapeutic Exercise:  [] See flow sheet :   Rationale: increase ROM and increase strength to improve the patients ability to lift  Elbow flexion from table level x 10  BUE 1# dowel x 10 bench press biceps curls   BUE shrugs, retraction  PROm supination and wrist extension    15 min Therapeutic Activity:  []  See flow sheet :   Rationale: improve coordination  to improve the patients ability to grasp, release  Standing grasp of square pegs, placed x 1 only  Standing grasp of 1/2 dowels unable to place  Trial with ring for zipper, able to unzip successfully         With   [] TE   [] TA   [] neuro   [] other: Patient Education: [x] Review HEP    [] Progressed/Changed HEP based on:   [] positioning   [] body mechanics   [] transfers   [] heat/ice application   [] Splint wear/care   [] Sensory re-education   [] scar management      [] other:            Other Objective/Functional Measures:   Not able to manitain graps on square peg to place accurately     Pain Level (0-10 scale) post treatment: 0/10    ASSESSMENT/Changes in Function: improving strength    Patient will continue to benefit from skilled OT services to modify and progress therapeutic interventions, address ROM deficits, address strength deficits, analyze and address soft tissue restrictions, analyze and cue movement patterns and instruct in home and community integration to attain remaining goals. []  See Plan of Care  []  See progress note/recertification  []  See Discharge Summary         Progress towards goals / Updated goals:  Nellie Landau will be able to pinch to  items with right hand   Status PN (10/28/2021): Progressing,  Able to perform removal portion [facility] with difficulty     LTG 3.  Patient will be able to reach with right hand to turn on and off faucet. Status PN  (10/28/2021): Improvement per patient report, however patient reporting moderate difficulty with task   Current Status (11/11/2021): able to simulate in clinic      Desire Mckeon will be able to use right hand to assist in washing Face as shown by increase in FOTO of at least 5 points.     Status PN (10/28/2021): 48 (-1)  Current Status (11/8/2021):-added place and hold elbow flexion and dowel elbow flexion to improve biceps strength     Added:  LTG 5. Patient will be demonstrate ability to open/close zipper with Right Hand as functional use for increased independence with don/doffing winter jacket. Status at PN: (10/28/2021): unable per patient    Current Status (11/18/2021):  Improving per patient report   11/22/21 able to zip with ring    PLAN  []  Upgrade activities as tolerated     [x]  Continue plan of care  []  Update interventions per flow sheet       []  Discharge due to:_  []  Other:_      MALLIKA Cunha/L 11/22/2021  10:18 AM    Future Appointments   Date Time Provider Marc Ruvalcaba   11/22/2021 10:30 AM Omaira Polanco OTR/L [id number] [facility]   11/24/2021  3:00 PM [id number] [facility]   11/29/2021  8:00 AM Louisa Alvarez MD [facility] AMB   8/30/2022 10:00 AM [facility] [address]   9/6/2022 10:00 AM Derek Meléndez Vikram Rater, [address]

## 2021-11-24 ENCOUNTER — HOSPITAL ENCOUNTER (OUTPATIENT)
Dept: PHYSICAL THERAPY | Age: 66
Discharge: HOME | End: 2021-11-24
Payer: MEDICARE

## 2021-11-24 PROCEDURE — 97110 THERAPEUTIC EXERCISES: CPT

## 2021-11-24 PROCEDURE — 97530 THERAPEUTIC ACTIVITIES: CPT

## 2021-11-24 NOTE — PROGRESS NOTES
OT DAILY TREATMENT NOTE 10-18    Patient Name: Cassandra Cornell  Date:2021  : 1955  [x]  Patient  Verified  Payor: Janis Ruiz / Plan: VA MEDICARE PART A & B / Product Type: Medicare /    In time:300  Out time:339  Total Treatment Time (min): 39  Visit #: 7 of 8    Medicare/BCBS Only   Total Timed Codes (min):  39 1:1 Treatment Time:  39     Treatment Area: Upper extremity weakness [R29.898]  Hemiplegia and hemiparesis following cerebral infarction affecting unspecified side [I69.359]    SUBJECTIVE  Pain Level (0-10 scale): 0/10  Any medication changes, allergies to medications, adverse drug reactions, diagnosis change, or new procedure performed?: [x] No    [] Yes (see summary sheet for update)  Subjective functional status/changes:   [] No changes reported  I have definitely gotten better    OBJECTIVE    243 min Therapeutic Exercise:  [] See flow sheet :   Rationale: increase ROM and increase strength to improve the patients ability to reach     Recheck for DC  BUE Floor reach   Shrugs/Retraction     15 min Therapeutic Activity:  []  See flow sheet :   Rationale: increase ROM and improve coordination  to improve the patients ability to manipulate small items       Recheck for DC  FOTO      With   [] TE   [] TA   [] neuro   [] other: Patient Education: [x] Review HEP    [] Progressed/Changed HEP based on:   [] positioning   [] body mechanics   [] transfers   [] heat/ice application   [] Splint wear/care   [] Sensory re-education   [] scar management      [] other:            Other Objective/Functional Measures:       Measurements: Taken with Samy Dynamometer, in Lbs   Level 2   Eval 8/31 9/30 10/28  11/24   Right 10 15 16 13 14   Left       +3  +4           Pinch Measurements: Taken with Pinch Gauge, in Lbs   (hand)   Eval 8/31 9/30 10/28 11/24   Lateral            Right 2 3 4 4 4   Left                                         9 Hole      Eval 8/31 9/30 10/28 11/24   Right Unable to complete 2:38   (All out) *1:42  (All Out)     **3:43   (5 out) *1:20  (All Out)     **1:34   (5 Out)     **2:10  (All Out) *1:05  (All Out)    **1:25  (5 Out)    **2:01  (All Out)   Left            * Left UE assist while seated  ** Unassisted in stand                             FOTO    8/4  Eval 8/31 9/30 10/28 11/24   Score 48 47 49 48 47   Change         -1         Pain Level (0-10 scale) post treatment: 0/10    ASSESSMENT/Changes in Function: improvement with FM skills      []  See Plan of Care  []  See progress note/recertification  [x]  See Discharge Summary         Progress towards goals / Updated goals:  Nellie Landau will be able to pinch to  items with right hand   Status PN (10/28/2021): Progressing,  Able to perform removal portion [facility] with difficulty  Status at Discharge (11/24/2021): Met       [place] will be able to reach with right hand to turn on and off faucet. Status PN  (10/28/2021): Improvement per patient report, however patient reporting moderate difficulty with task   Status at Discharge (11/24/2021): Goal Met       Nellie Landau will be able to use right hand to assist in washing Face as shown by increase in FOTO of at least 5 points.     Status PN (10/28/2021): 48   Status at Discharge (11/24/2021): 47 (-1)       LTG 5. Patient will be demonstrate ability to open/close zipper with Right Hand as functional use for increased independence with don/doffing winter jacket.   Status at PN: (10/28/2021): unable per patient    Status at Discharge (11/24/2021): Able to zip with use of Ring         PLAN  []  Upgrade activities as tolerated     []  Continue plan of care  []  Update interventions per flow sheet       [x]  Discharge due to:_ Progressing towards goals   []  Other:_      JUSTIN Márquez/ROVERTO 11/24/2021  9:28 AM    Future Appointments   Date Time Provider Marc Ruvalcaba   11/24/2021  3:00 PM Yola Muse DILJGZH [facility]   11/29/2021  8:00 AM Noble Efraín Montalvo MD [facility]   8/30/2022 10:00 AM [facility] NURSE NOAM LEAVITT   9/6/2022 10:00 AM PRANAY Frye

## 2021-11-24 NOTE — PROGRESS NOTES
[facility] - [address]. [facility]  [phone] fax      Patient Name: Sami Montanez  : 1955      Occupational Therapy Discharge Instructions        Continue with home exercise program for 2-3 times a day for 4 weeks then decrease to 1-2 times a day as needed/patient discretion. Continue with    [] Ice  as needed      [] Heat           Follow up with MD:     [] Upon completion of therapy     [x] As needed      Recommendations:    []   Return to activity with home program                    []  Return to activity with the following modifications :                              []  Practice Hand coordination activities                                      []  Wear Splint as prescribed:                    [] Return to/Join local gym        Additional comments:    - Continue  Using hand to small item ! Please call with any questions or concerns. Thank you for your participation.          DARELL Michel  2021  3:30 PM

## 2021-11-29 ENCOUNTER — OFFICE VISIT (OUTPATIENT)
Dept: NEUROLOGY | Age: 66
End: 2021-11-29
Payer: MEDICARE

## 2021-11-29 VITALS
SYSTOLIC BLOOD PRESSURE: 160 MMHG | BODY MASS INDEX: 31.7 KG/M2 | DIASTOLIC BLOOD PRESSURE: 70 MMHG | OXYGEN SATURATION: 97 % | RESPIRATION RATE: 20 BRPM | HEIGHT: 69 IN | WEIGHT: 214 LBS | HEART RATE: 66 BPM

## 2021-11-29 DIAGNOSIS — I63.9 INFARCTION OF LEFT BASAL GANGLIA (HCC): ICD-10-CM

## 2021-11-29 DIAGNOSIS — G81.91 RIGHT HEMIPARESIS (HCC): Primary | ICD-10-CM

## 2021-11-29 PROCEDURE — 1101F PT FALLS ASSESS-DOCD LE1/YR: CPT | Performed by: STUDENT IN AN ORGANIZED HEALTH CARE EDUCATION/TRAINING PROGRAM

## 2021-11-29 PROCEDURE — G8417 CALC BMI ABV UP PARAM F/U: HCPCS | Performed by: STUDENT IN AN ORGANIZED HEALTH CARE EDUCATION/TRAINING PROGRAM

## 2021-11-29 PROCEDURE — G8432 DEP SCR NOT DOC, RNG: HCPCS | Performed by: STUDENT IN AN ORGANIZED HEALTH CARE EDUCATION/TRAINING PROGRAM

## 2021-11-29 PROCEDURE — G8536 NO DOC ELDER MAL SCRN: HCPCS | Performed by: STUDENT IN AN ORGANIZED HEALTH CARE EDUCATION/TRAINING PROGRAM

## 2021-11-29 PROCEDURE — 99214 OFFICE O/P EST MOD 30 MIN: CPT | Performed by: STUDENT IN AN ORGANIZED HEALTH CARE EDUCATION/TRAINING PROGRAM

## 2021-11-29 PROCEDURE — G0463 HOSPITAL OUTPT CLINIC VISIT: HCPCS | Performed by: STUDENT IN AN ORGANIZED HEALTH CARE EDUCATION/TRAINING PROGRAM

## 2021-11-29 PROCEDURE — G8427 DOCREV CUR MEDS BY ELIG CLIN: HCPCS | Performed by: STUDENT IN AN ORGANIZED HEALTH CARE EDUCATION/TRAINING PROGRAM

## 2021-11-29 PROCEDURE — 3017F COLORECTAL CA SCREEN DOC REV: CPT | Performed by: STUDENT IN AN ORGANIZED HEALTH CARE EDUCATION/TRAINING PROGRAM

## 2021-11-29 NOTE — PROGRESS NOTES
Yrn Birmingham is a 78 y.o. male . presents for No chief complaint on file. .    A 78years old male patient with left posterior basal ganglia and corona radiata stroke in August 2019 with residual  right-sided hemiparesis here for follow-up. Last seen in the clinic in May 2021. He is on PT. Continues to have the right-sided weakness. He claims he is trying to use the right hand/fingers a little bit more. Able to ambulate with a cane. No recent falls. No pain/spasms. No numbness. No difficulty with speech or swallowing. Taking his medications properly. Denied any difficulty driving. Follow-up  Pertinent negatives include no chest pain, no headaches and no shortness of breath. Review of Systems   Constitutional:  Negative for chills and fever. HENT:  Negative for hearing loss and tinnitus. Eyes:  Negative for blurred vision and double vision. Respiratory:  Negative for cough and shortness of breath. Cardiovascular:  Negative for chest pain and leg swelling. Gastrointestinal:  Negative for nausea and vomiting. Genitourinary:  Negative for dysuria, frequency and urgency. Musculoskeletal:  Negative for back pain and neck pain. Skin:  Negative for itching and rash. Neurological:  Positive for focal weakness (righgt hemiparesis). Negative for dizziness, tingling, tremors, speech change (mild slurring) and headaches. Endo/Heme/Allergies:  Bruises/bleeds easily. Psychiatric/Behavioral:  Negative for depression. The patient is not nervous/anxious.       Past Medical History:   Diagnosis Date    Acute ischemic stroke ([facility] [address] 8/12/2019    Acute Ischemic Stroke (acute/early subacute infarct at the left posterior basal ganglia to corona radiata) with residual right hemiparesis, dysphagia and dysarthria    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     On Allopurinol    Chronic hypokalemia     Persistent chronic hypokalemia + hypertension; ?primary hyperaldosteronism    CKD (chronic kidney disease) stage 2, GFR 60-89 ml/min 8/15/2019    Current use of aspirin 8/14/2019    Dysarthria 8/12/2019    Dysphagia 8/12/2019    Elevated prostate specific antigen (PSA) 7/21/2011    First degree atrioventricular block by electrocardiogram 8/12/2019    Hypertensive heart and kidney disease without heart failure and with stage 2 chronic kidney disease     Iron deficiency anemia 8/14/2019    Anemia work-up (8/14/2019) showed serum iron 22, TIBC 371, iron % saturation 6, ferritin 34, reticulocyte count 1.4     Obesity, Class I, BMI 30-34.9     Obstructive sleep apnea on CPAP     On statin therapy due to risk of future cardiovascular event 8/14/2019    On Atorvastatin    Primary osteoarthritis of right ankle 8/22/2019    X-ray of the right ankle (8/22/2019) showed no acute fracture or subluxation; advanced degenerative changes at the tibiotalar joint; old fracture fragment vs. accessory ossicle in the inferior aspect of the lateral malleolus; soft tissue swelling around the ankle. Pure hypercholesterolemia 08/15/2019    Lipid profile (8/13/2019) showed , , HDL 42, ; Lipid profile (8/14/2019) showed , , HDL 39, LDL 94    Received intravenous tissue plasminogen activator (tPA) in emergency department 8/12/2019    Refusal of statin medication by patient 8/13/2019    As per note of Neurology (Dr. Laura Mackay), patient refuses statin agent because of potential side effects.      Right hemiparesis ([facility] [address] 8/12/2019    Secondary hyperparathyroidism of renal origin ([facility] [address] 8/18/2019    PTH (8/18/2019) = 101.7    Type 2 diabetes mellitus with stage 2 chronic kidney disease (HCC)     HbA1c (8/13/2019) = 6.6    Vitamin B12 deficiency anemia 8/14/2019    Vitamin B12 (8/14/2019) = 208    Vitamin D deficiency 8/19/2019    Vitamin D 25-Hydroxy (8/19/2019) = 16.2        Past Surgical History:   Procedure Laterality Date    COLONOSCOPY N/A 4/15/2019    COLONOSCOPY performed by Cynthia Kidd MD at [facility] ENDOSCOPY    HX TONSILLECTOMY          Family History   Problem Relation Age of Onset    Cancer Mother         Gastric cancer    Heart Disease Father     Prostate Cancer Father         Social History     Socioeconomic History    Marital status:      Spouse name: Not on file    Number of children: Not on file    Years of education: Not on file    Highest education level: Not on file   Occupational History    Not on file   Tobacco Use    Smoking status: Former Smoker    Smokeless tobacco: Never Used   Substance and Sexual Activity    Alcohol use: No     Alcohol/week: 0.0 standard drinks    Drug use: No    Sexual activity: Not on file   Other Topics Concern    Not on file   Social History Narrative    Not on file     Social Determinants of Health     Financial Resource Strain:     Difficulty of Paying Living Expenses: Not on file   Food Insecurity:     Worried About [address] in the Last Year: Not on file    Tha of Food in the Last Year: Not on file   Transportation Needs:     Lack of Transportation (Medical): Not on file    Lack of Transportation (Non-Medical):  Not on file   Physical Activity:     Days of Exercise per Week: Not on file    Minutes of Exercise per Session: Not on file   Stress:     Feeling of Stress : Not on file   Social Connections:     Frequency of Communication with Friends and Family: Not on file    Frequency of Social Gatherings with Friends and Family: Not on file    Attends Jain Services: Not on file    Active Member of Clubs or Organizations: Not on file    Attends Club or Organization Meetings: Not on file    Marital Status: Not on file   Intimate Partner Violence:     Fear of Current or Ex-Partner: Not on file    Emotionally Abused: Not on file    Physically Abused: Not on file    Sexually Abused: Not on file   Housing Stability:     Unable to Pay for Housing in the Last Year: Not on file    Number of [place] in the Last Year: Not on file    Unstable Housing in the Last Year: Not on file        No Known Allergies      Current Outpatient Medications   Medication Sig Dispense Refill    finasteride (PROSCAR) 5 mg tablet Take 1 Tablet by mouth daily. Indications: enlarged prostate with urination problem 90 Tablet 4    MULTIVITAMIN PO Take 1 Tablet by mouth daily. tiZANidine (ZANAFLEX) 4 mg tablet TAKE 1 TABLET BY MOUTH EVERYDAY AT BEDTIME      spironolactone (ALDACTONE) 25 mg tablet TAKE 1 TABLET BY MOUTH TWICE A DAY      Klor-Con M20 20 mEq tablet TAKE 1 TABLET BY MOUTH ONCE A DAY. DO NOT CRUSH OR CHEW.      olmesartan (BENICAR) 40 mg tablet Take 40 mg by mouth daily. glucose blood VI test strips (ASCENSIA AUTODISC VI, ONE TOUCH ULTRA TEST VI) strip Use one strip with glucometer daily in AM before breakfast and PRN (E11.21)      hydroCHLOROthiazide (HYDRODIURIL) 25 mg tablet Take 25 mg by mouth.      lancets misc Prick finger with lancet once a day in AM before breakfast or PRN to monitor blood sugar (E11.21)      sertraline (ZOLOFT) 50 mg tablet       carvediloL (COREG) 12.5 mg tablet Take 12.5 mg by mouth. ferrous sulfate 325 mg (65 mg iron) tablet Take 1 Tab by mouth daily (with breakfast). 30 Tab 0    hydrALAZINE (APRESOLINE) 25 mg tablet Take 3 Tabs by mouth every eight (8) hours. Indications: high blood pressure 270 Tab 0    aspirin 81 mg chewable tablet Take 1 Tab by mouth daily (with breakfast). Indications: stroke prevention 30 Tab 0    atorvastatin (LIPITOR) 80 mg tablet Take 1 Tab by mouth nightly. Indications: excessive fat in the blood, stroke prevention 30 Tab 0    amLODIPine (NORVASC) 10 mg tablet Take 10 mg by mouth daily. Physical Exam  Constitutional:       Appearance: Normal appearance. HENT:      Head: Atraumatic. Mouth/Throat:      Mouth: Mucous membranes are moist.      Pharynx: Oropharynx is clear. No oropharyngeal exudate. Eyes:      Extraocular Movements: Extraocular movements intact.       Pupils: Pupils are equal, round, and reactive to light. Pulmonary:      Effort: Pulmonary effort is normal. No respiratory distress. Musculoskeletal:      Cervical back: Normal range of motion and neck supple. Comments: Right hemiparesis [upper extremity more than the lower extremity]   Neurological:      Mental Status: He is alert. Comments: Mental status: Awake, alert, oriented x3, follows simple and complex commands, no neglect, no extinction to DSS or VSS. Speech and languge: fluent with no significant dysarthria, coherent,  and comprehension intact  CN: VFF, EOMI, PERRLA, face sensation intact , mild right lower facial asymmetry noted, palate elevation symmetric bilat, difficulty lifting the right shoulder up;  tongue midline  Motor: Slightly increased tone over the right upper and lower extremities; strength over the right hand is 2-3/5; elbow to 3/5 and shoulder abduction is 3 out of 5; right lower extremity hip flexion 4+/5; knee extension 4+/5; knee flexion 4 /5; ankle dorsiflexion and plantar flexion 3/5. Strength is over the left side is normal.    Coordination: FNF, HS accurate w/o dysmetria on the left side; unable to test on the left. DTR: 2+ on the  left side and 3+ on the right side. Gait: Hemiparetic.           Office Visit on 08/30/2021   Component Date Value Ref Range Status    Color (UA POC) 08/30/2021 Yellow   Final    Clarity (UA POC) 08/30/2021 Clear   Final    Glucose (UA POC) 08/30/2021 Negative  Negative Final    Bilirubin (UA POC) 08/30/2021 Negative  Negative Final    Ketones (UA POC) 08/30/2021 Negative  Negative Final    Specific gravity (UA POC) 08/30/2021 1.020  1.001 - 1.035 Final    Blood (UA POC) 08/30/2021 Negative  Negative Final    pH (UA POC) 08/30/2021 5.0  4.6 - 8.0 Final    Protein (UA POC) 08/30/2021 Negative  Negative Final    Urobilinogen (UA POC) 08/30/2021 0.2 mg/dL  0.2 - 1 Final    Nitrites (UA POC) 08/30/2021 Negative  Negative Final    Leukocyte esterase (UA POC) 08/30/2021 Negative  Negative Final             ICD-10-CM ICD-9-CM    1. Right hemiparesis (HCC)  G81.91 342.90    2. Infarction of left basal ganglia [facility])  I63.9 434.91      A 25-year-old male patient with history of left basal ganglia lacunar stroke in August 2019 here for follow-up evaluation. Claims hand function is a little better with physical therapy. No difficulty driving. Walks with a cane. Following with physical therapy. He is on aspirin and high-dose atorvastatin. We will see him again in 6 months time to renew his DMV papers.

## 2021-11-29 NOTE — PROGRESS NOTES
[facility] Ign[facility] Storey  [address]  [phone] fax    Occupational Therapy Discharge Summary  Patient name: Waleska Silva Start of Care: *21**   Referral source: Guera Barrientos NP : [id number]   Medical/Treatment Diagnosis: Upper extremity weakness [R29.898]  Hemiplegia and hemiparesis following cerebral infarction affecting unspecified side [I69.359]  Payor: [place] MEDICARE / Plan: [place] MEDICARE PART A & B / Product Type: Medicare /  Onset Date:2019     Prior Hospitalization: see medical history Provider#: [id number]   Medications: Verified on Patient Summary List    Comorbidities: HTN, arthritis  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 He has HEP.        Measurements: Taken with Samy Dynamometer, in Lbs   Level 2   Eval 8/31 9/30 10/28  11/24   Right 10 15 16 13 14   Left       +3  +4           Pinch Measurements: Taken with Pinch Gauge, in Lbs   (hand) 8/4  Eval 8/31 9/30 10/28 11/24   Lateral             Right 2 3 4 4 4   Left                                            9 Hole      Eval 8/31 9/30 10/28 11/24   Right Unable to complete 2:38   (All out) *1:42  (All Out)     **3:43   (5 out) *1:20  (All Out)     **1:34   (5 Out)     **2:10  (All Out) *1:05  (All Out)     **1:25  (5 Out)    **2:01  (all out)    * Left UE assist while seated  ** Unassisted in stand                          FOTO      Eval 8/31 9/30 10/28 11/24   Score 48 47 49 48 47   Change         -1     STG 3.  Patient will be able to pinch to  items with right hand   Status PN (10/28/2021): Progressing,  Able to perform removal portion [facility] with difficulty  Status at Discharge (11/24/2021): Met        Ritchie Briceno will be able to reach with right hand to turn on and off faucet. Status PN  (10/28/2021): Improvement per patient report, however patient reporting moderate difficulty with task   Status at Discharge (11/24/2021): Goal Met        Ritchie Briceno will be able to use right hand to assist in washing Face as shown by increase in FOTO of at least 5 points.     Status PN (10/28/2021): 48   Status at Discharge (11/24/2021): 47 (-1)Not met     LTG 5. Patient will be demonstrate ability to open/close zipper with Right Hand as functional use for increased independence with don/doffing winter jacket.   Status at PN: (10/28/2021): unable per patient    Status at Discharge (11/24/2021): Able to zip with use of Ring      ASSESSMENT/Changes in Function: improved self care skills, elbow ROM, fine motor skills over course of therapy    ASSESSMENT/RECOMMENDATIONS:  [x]Discontinue therapy: [x]Patient has reached or is progressing toward set goals      []Patient is non-compliant or has abdicated      []Due to lack of appreciable progress towards set goals    RICHELLE Leung 11/29/2021 3:21 PM

## 2021-12-02 ENCOUNTER — APPOINTMENT (OUTPATIENT)
Dept: PHYSICAL THERAPY | Age: 66
End: 2021-12-02

## 2021-12-17 ENCOUNTER — ANESTHESIA EVENT (OUTPATIENT)
Dept: ENDOSCOPY | Age: 66
End: 2021-12-17
Payer: MEDICARE

## 2021-12-20 ENCOUNTER — ANESTHESIA (OUTPATIENT)
Dept: ENDOSCOPY | Age: 66
End: 2021-12-20
Payer: MEDICARE

## 2021-12-20 ENCOUNTER — HOSPITAL ENCOUNTER (OUTPATIENT)
Age: 66
Setting detail: OUTPATIENT SURGERY
Discharge: HOME | End: 2021-12-20
Attending: INTERNAL MEDICINE | Admitting: INTERNAL MEDICINE
Payer: MEDICARE

## 2021-12-20 VITALS
HEART RATE: 57 BPM | OXYGEN SATURATION: 97 % | BODY MASS INDEX: 32.14 KG/M2 | TEMPERATURE: 97.2 F | RESPIRATION RATE: 20 BRPM | WEIGHT: 217 LBS | DIASTOLIC BLOOD PRESSURE: 65 MMHG | HEIGHT: 69 IN | SYSTOLIC BLOOD PRESSURE: 155 MMHG

## 2021-12-20 LAB
GLUCOSE BLD STRIP.AUTO-MCNC: 109 MG/DL (ref 70–110)
GLUCOSE BLD STRIP.AUTO-MCNC: 96 MG/DL (ref 70–110)

## 2021-12-20 PROCEDURE — 00811 ANES LWR INTST NDSC NOS: CPT | Performed by: NURSE ANESTHETIST, CERTIFIED REGISTERED

## 2021-12-20 PROCEDURE — 74011250637 HC RX REV CODE- 250/637: Performed by: NURSE ANESTHETIST, CERTIFIED REGISTERED

## 2021-12-20 PROCEDURE — 77030013992 HC SNR POLYP ENDOSC BSC -B: Performed by: INTERNAL MEDICINE

## 2021-12-20 PROCEDURE — 74011250636 HC RX REV CODE- 250/636: Performed by: NURSE ANESTHETIST, CERTIFIED REGISTERED

## 2021-12-20 PROCEDURE — 82962 GLUCOSE BLOOD TEST: CPT

## 2021-12-20 PROCEDURE — 77030008565 HC TBNG SUC IRR ERBE -B: Performed by: INTERNAL MEDICINE

## 2021-12-20 PROCEDURE — 2709999900 HC NON-CHARGEABLE SUPPLY: Performed by: INTERNAL MEDICINE

## 2021-12-20 PROCEDURE — 76060000031 HC ANESTHESIA FIRST 0.5 HR: Performed by: INTERNAL MEDICINE

## 2021-12-20 PROCEDURE — 74011000250 HC RX REV CODE- 250: Performed by: NURSE ANESTHETIST, CERTIFIED REGISTERED

## 2021-12-20 PROCEDURE — 76040000019: Performed by: INTERNAL MEDICINE

## 2021-12-20 PROCEDURE — 88305 TISSUE EXAM BY PATHOLOGIST: CPT

## 2021-12-20 PROCEDURE — 77030021593 HC FCPS BIOP ENDOSC BSC -A: Performed by: INTERNAL MEDICINE

## 2021-12-20 PROCEDURE — 00811 ANES LWR INTST NDSC NOS: CPT | Performed by: ANESTHESIOLOGY

## 2021-12-20 RX ORDER — ONDANSETRON 2 MG/ML
4 INJECTION INTRAMUSCULAR; INTRAVENOUS ONCE
Status: CANCELLED | OUTPATIENT
Start: 2021-12-20 | End: 2021-12-20

## 2021-12-20 RX ORDER — HYDRALAZINE HYDROCHLORIDE 20 MG/ML
5 INJECTION INTRAMUSCULAR; INTRAVENOUS ONCE
Status: COMPLETED | OUTPATIENT
Start: 2021-12-20 | End: 2021-12-20

## 2021-12-20 RX ORDER — DEXTROSE 50 % IN WATER (D50W) INTRAVENOUS SYRINGE
25-50 AS NEEDED
Status: CANCELLED | OUTPATIENT
Start: 2021-12-20

## 2021-12-20 RX ORDER — SODIUM CHLORIDE 0.9 % (FLUSH) 0.9 %
5-40 SYRINGE (ML) INJECTION AS NEEDED
Status: CANCELLED | OUTPATIENT
Start: 2021-12-20

## 2021-12-20 RX ORDER — FAMOTIDINE 20 MG/1
20 TABLET, FILM COATED ORAL ONCE
Status: COMPLETED | OUTPATIENT
Start: 2021-12-20 | End: 2021-12-20

## 2021-12-20 RX ORDER — SODIUM CHLORIDE 0.9 % (FLUSH) 0.9 %
5-40 SYRINGE (ML) INJECTION EVERY 8 HOURS
Status: CANCELLED | OUTPATIENT
Start: 2021-12-20

## 2021-12-20 RX ORDER — INSULIN LISPRO 100 [IU]/ML
INJECTION, SOLUTION INTRAVENOUS; SUBCUTANEOUS ONCE
Status: DISCONTINUED | OUTPATIENT
Start: 2021-12-20 | End: 2021-12-20 | Stop reason: HOSPADM

## 2021-12-20 RX ORDER — DEXTROSE 50 % IN WATER (D50W) INTRAVENOUS SYRINGE
25-50 AS NEEDED
Status: DISCONTINUED | OUTPATIENT
Start: 2021-12-20 | End: 2021-12-21 | Stop reason: HOSPADM

## 2021-12-20 RX ORDER — LIDOCAINE HYDROCHLORIDE 20 MG/ML
INJECTION, SOLUTION EPIDURAL; INFILTRATION; INTRACAUDAL; PERINEURAL AS NEEDED
Status: DISCONTINUED | OUTPATIENT
Start: 2021-12-20 | End: 2021-12-20 | Stop reason: HOSPADM

## 2021-12-20 RX ORDER — MAGNESIUM SULFATE 100 %
4 CRYSTALS MISCELLANEOUS AS NEEDED
Status: CANCELLED | OUTPATIENT
Start: 2021-12-20

## 2021-12-20 RX ORDER — SODIUM CHLORIDE, SODIUM LACTATE, POTASSIUM CHLORIDE, CALCIUM CHLORIDE 600; 310; 30; 20 MG/100ML; MG/100ML; MG/100ML; MG/100ML
75 INJECTION, SOLUTION INTRAVENOUS CONTINUOUS
Status: DISCONTINUED | OUTPATIENT
Start: 2021-12-20 | End: 2021-12-21 | Stop reason: HOSPADM

## 2021-12-20 RX ORDER — LIDOCAINE HYDROCHLORIDE 10 MG/ML
0.1 INJECTION, SOLUTION EPIDURAL; INFILTRATION; INTRACAUDAL; PERINEURAL AS NEEDED
Status: DISCONTINUED | OUTPATIENT
Start: 2021-12-20 | End: 2021-12-21 | Stop reason: HOSPADM

## 2021-12-20 RX ORDER — INSULIN LISPRO 100 [IU]/ML
INJECTION, SOLUTION INTRAVENOUS; SUBCUTANEOUS ONCE
Status: CANCELLED | OUTPATIENT
Start: 2021-12-20 | End: 2021-12-21

## 2021-12-20 RX ORDER — FENTANYL CITRATE 50 UG/ML
50 INJECTION, SOLUTION INTRAMUSCULAR; INTRAVENOUS AS NEEDED
Status: CANCELLED | OUTPATIENT
Start: 2021-12-20

## 2021-12-20 RX ORDER — PROPOFOL 10 MG/ML
INJECTION, EMULSION INTRAVENOUS AS NEEDED
Status: DISCONTINUED | OUTPATIENT
Start: 2021-12-20 | End: 2021-12-20 | Stop reason: HOSPADM

## 2021-12-20 RX ORDER — SODIUM CHLORIDE, SODIUM LACTATE, POTASSIUM CHLORIDE, CALCIUM CHLORIDE 600; 310; 30; 20 MG/100ML; MG/100ML; MG/100ML; MG/100ML
75 INJECTION, SOLUTION INTRAVENOUS CONTINUOUS
Status: CANCELLED | OUTPATIENT
Start: 2021-12-20 | End: 2021-12-20

## 2021-12-20 RX ORDER — MAGNESIUM SULFATE 100 %
4 CRYSTALS MISCELLANEOUS AS NEEDED
Status: DISCONTINUED | OUTPATIENT
Start: 2021-12-20 | End: 2021-12-21 | Stop reason: HOSPADM

## 2021-12-20 RX ADMIN — SODIUM CHLORIDE, SODIUM LACTATE, POTASSIUM CHLORIDE, AND CALCIUM CHLORIDE 75 ML/HR: 600; 310; 30; 20 INJECTION, SOLUTION INTRAVENOUS at 11:46

## 2021-12-20 RX ADMIN — HYDRALAZINE HYDROCHLORIDE 5 MG: 20 INJECTION INTRAMUSCULAR; INTRAVENOUS at 14:00

## 2021-12-20 RX ADMIN — FAMOTIDINE 20 MG: 20 TABLET ORAL at 11:35

## 2021-12-20 RX ADMIN — PROPOFOL 60 MG: 10 INJECTION, EMULSION INTRAVENOUS at 12:14

## 2021-12-20 RX ADMIN — LIDOCAINE HYDROCHLORIDE 40 MG: 20 INJECTION, SOLUTION EPIDURAL; INFILTRATION; INTRACAUDAL; PERINEURAL at 12:14

## 2021-12-20 NOTE — ANESTHESIA PREPROCEDURE EVALUATION
Relevant Problems   RESPIRATORY SYSTEM   (+) Obstructive sleep apnea on CPAP      NEUROLOGY   (+) Acute ischemic stroke (HCC)      RENAL FAILURE   (+) CKD (chronic kidney disease) stage 2, GFR 60-89 ml/min   (+) Hypertensive heart and kidney disease without heart failure and with stage 2 chronic kidney disease      ENDOCRINE   (+) Type 2 diabetes mellitus with stage 2 chronic kidney disease (HCC)      HEMATOLOGY   (+) Iron deficiency anemia   (+) Vitamin B12 deficiency anemia       Anesthetic History   No history of anesthetic complications            Review of Systems / Medical History  Patient summary reviewed and pertinent labs reviewed    Pulmonary        Sleep apnea: CPAP           Neuro/Psych       CVA (Right Hemiparesis)       Cardiovascular                  Exercise tolerance: >4 METS     GI/Hepatic/Renal         Renal disease: CRI       Endo/Other    Diabetes (Diet controlled)    Arthritis     Other Findings            Physical Exam    Airway  Mallampati: II  TM Distance: 4 - 6 cm  Neck ROM: normal range of motion   Mouth opening: Normal     Cardiovascular  Regular rate and rhythm,  S1 and S2 normal,  no murmur, click, rub, or gallop             Dental    Dentition: Full lower dentures and Full upper dentures     Pulmonary  Breath sounds clear to auscultation               Abdominal  GI exam deferred       Other Findings            Anesthetic Plan    ASA: 3  Anesthesia type: MAC          Induction: Intravenous  Anesthetic plan and risks discussed with: Patient

## 2021-12-20 NOTE — DISCHARGE INSTRUCTIONS
Colonoscopy: What to Expect at [address] [place] [address]  After you have a colonoscopy, you will stay at the clinic for 1 to 2 hours until the medicines wear off. Then you can go home. But you will need to arrange for a ride. Your doctor will tell you when you can eat and do your other usual activities. Your doctor will talk to you about when you will need your next colonoscopy. Your doctor can help you decide how often you need to be checked. This will depend on the results of your test and your risk for colorectal cancer. After the test, you may be bloated or have gas pains. You may need to pass gas. If a biopsy was done or a polyp was removed, you may have streaks of blood in your stool (feces) for a few days. This care sheet gives you a general idea about how long it will take for you to recover. But each person recovers at a different pace. Follow the steps below to get better as quickly as possible. How can you care for yourself at home? Activity  · Rest when you feel tired. · You can do your normal activities when it feels okay to do so. Diet  · Follow your doctor's directions for eating. · Unless your doctor has told you not to, drink plenty of fluids. This helps to replace the fluids that were lost during the colon prep. · Do not drink alcohol. Medicines  · If polyps were removed or a biopsy was done during the test, your doctor may tell you not to take aspirin or other anti-inflammatory medicines for a few days. These include ibuprofen (Advil, Motrin) and naproxen (Aleve). Other instructions  · For your safety, do not drive or operate machinery until the medicine wears off and you can think clearly. Your doctor may tell you not to drive or operate machinery until the day after your test.  · Do not sign legal documents or make major decisions until the medicine wears off and you can think clearly. The anesthesia can make it hard for you to fully understand what you are agreeing to.   Follow-up care is a key part of your treatment and safety. Be sure to make and go to all appointments, and call your doctor if you are having problems. It's also a good idea to know your test results and keep a list of the medicines you take. When should you call for help? Call 911 anytime you think you may need emergency care. For example, call if:  · You passed out (lost consciousness). · You pass maroon or bloody stools. · You have severe belly pain. Call your doctor now or seek immediate medical care if:  · Your stools are black and tarlike. · Your stools have streaks of blood, but you did not have a biopsy or any polyps removed. · You have belly pain, or your belly is swollen and firm. · You vomit. · You have a fever. · You are very dizzy. Watch closely for changes in your health, and be sure to contact your doctor if you have any problems. Where can you learn more? Go to [employer]  Enter E264 in the search box to learn more about \"Colonoscopy: What to Expect at Home. \"   © 6648-7777 Healthwise, Incorporated. Care instructions adapted under license by [facility] (which disclaims liability or warranty for this information). This care instruction is for use with your licensed healthcare professional. If you have questions about a medical condition or this instruction, always ask your healthcare professional. [place] [address] any warranty or liability for your use of this information. Content Version: 79.7.918964; Current as of: November 14, 2014      DISCHARGE SUMMARY from Nurse     POST-PROCEDURE INSTRUCTIONS:    Call your Physician if you:  ? Observe any excess bleeding. ? Develop a temperature over 100.5o F.  ? Experience abdominal, shoulder or chest pain. ? Notice any signs of decreased circulation or nerve impairment to an extremity such as a change in color, persistent numbness, tingling, coldness or increase in pain. ?  Vomit blood or you have nausea and vomiting lasting longer than 4 hours. ? Are unable to take medications. ? Are unable to urinate within 8 hours after discharge following general anesthesia or intravenous sedation. For the next 24 hours after receiving general anesthesia or intravenous sedation, or while taking prescription Narcotics, limit your activities:  ? Do NOT drive a motor vehicle, operate hazard machinery or power tools, or perform tasks that require coordination. The medication you received during your procedure may have some effect on your mental awareness. ? Do NOT make important personal or business decisions. The medication you received during your procedure may have some effect on your mental awareness. ? Do NOT drink alcoholic beverages. These drinks do not mix well with the medications that have been given to you. ? Upon discharge from the hospital, you must be accompanied by a responsible adult. ? Resume your diet as directed by your physician. ? Resume medications as your physician has prescribed. ? Please give a list of your current medications to your Primary Care Provider. ? Please update this list whenever your medications are discontinued, doses are changed, or new medications (including over-the-counter products) are added. ? Please carry medication information at all times in case of emergency situations. These are general instructions for a healthy lifestyle:    No smoking/ No tobacco products/ Avoid exposure to second hand smoke.  Surgeon General's Warning:  Quitting smoking now greatly reduces serious risk to your health. Obesity, smoking, and a sedentary lifestyle greatly increase your risk for illness.    A healthy diet, regular physical exercise & weight monitoring are important for maintaining a healthy lifestyle   You may be retaining fluid if you have a history of heart failure or if you experience any of the following symptoms:  Weight gain of 3 pounds or more overnight or 5 pounds in a week, increased swelling in our hands or feet or shortness of breath while lying flat in bed. Please call your doctor as soon as you notice any of these symptoms; do not wait until your next office visit. Colorectal Screening   Colorectal cancer almost always develops from precancerous polyps (abnormal growths) in the colon or rectum. Screening tests can find precancerous polyps, so that they can be removed before they turn into cancer. Screening tests can also find colorectal cancer early, when treatment works best.  Viviana Jimenes Speak with your physician about when you should begin screening and how often you should be tested. Additional Information    Educational references and/or instructions provided during this visit included:    See attached. APPOINTMENTS:    Per MD Instruction. Discharge information has been reviewed with the patient. The patient verbalized understanding. Patient Education        Colon Polyps: Care Instructions  Your Care Instructions     Colon polyps are growths in the colon or the rectum. The cause of most colon polyps is not known, and most people who get them do not have any problems. But a certain kind can turn into cancer. For this reason, regular testing for colon polyps is important for people as they get older. It is also important for anyone who has an increased risk for colon cancer. Polyps are usually found through routine colon cancer screening tests. Although most colon polyps are not cancerous, they are usually removed and then tested for cancer. Screening for colon cancer saves lives because the cancer can usually be cured if it is caught early. If you have a polyp that is the type that can turn into cancer, you may need more tests to examine your entire colon. The doctor will remove any other polyps that he or she finds, and you will be tested more often. Follow-up care is a key part of your treatment and safety.  Be sure to make and go to all appointments, and call your doctor if you are having problems. It's also a good idea to know your test results and keep a list of the medicines you take. How can you care for yourself at home? Regular exams to look for colon polyps are the best way to prevent polyps from turning into colon cancer. These can include stool tests, sigmoidoscopy, colonoscopy, and CT colonography. Talk with your doctor about a testing schedule that is right for you. To prevent polyps  There is no home treatment that can prevent colon polyps. But these steps may help lower your risk for cancer. · Stay active. Being active can help you get to and stay at a healthy weight. Try to exercise on most days of the week. Walking is a good choice. · Eat well. Choose a variety of vegetables, fruits, legumes (such as peas and beans), fish, poultry, and whole grains. · Do not smoke. If you need help quitting, talk to your doctor about stop-smoking programs and medicines. These can increase your chances of quitting for good. · If you drink alcohol, limit how much you drink. Limit alcohol to 2 drinks a day for men and 1 drink a day for women. When should you call for help? Call your doctor now or seek immediate medical care if:    · You have severe belly pain.     · Your stools are maroon or very bloody. Watch closely for changes in your health, and be sure to contact your doctor if:    · You have a fever.     · You have nausea or vomiting.     · You have a change in bowel habits (new constipation or diarrhea).     · Your symptoms get worse or are not improving as expected. Where can you learn more? Go to http://www.obregon.com/  Enter C571 in the search box to learn more about \"Colon Polyps: Care Instructions. \"  Current as of: December 17, 2020               Content Version: 13.0  © 4505-7462 Healthwise, Incorporated.    Care instructions adapted under license by [employer] (which disclaims liability or warranty for this information). If you have questions about a medical condition or this instruction, always ask your healthcare professional. [place] [address] any warranty or liability for your use of this information.

## 2021-12-20 NOTE — H&P
[email or web address]  [phone]    Gastroenterology pre op History and Physical     Impression:   1. High risk colon cancer screening/Colon polyp surveillance exam      Plan:     1. Colonoscopy    Addendum: All lab tests orders pertaining to the procedure have been ordered by the anesthesia personnel and results will be addressed by the anesthesia team      Chief Complaint: high risk colon cancer screening exam.      HPI:  Raad Cordero is a 78 y.o. male who is being seen on consult for high risk colon cancer screening with colonoscopy.  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     On Allopurinol    Chronic hypokalemia     Persistent chronic hypokalemia + hypertension; ?primary hyperaldosteronism    CKD (chronic kidney disease) stage 2, GFR 60-89 ml/min 8/15/2019    Current use of aspirin 8/14/2019    Dysarthria 8/12/2019    Dysphagia 8/12/2019    Elevated prostate specific antigen (PSA) 7/21/2011    First degree atrioventricular block by electrocardiogram 8/12/2019    Hypertensive heart and kidney disease without heart failure and with stage 2 chronic kidney disease     Iron deficiency anemia 8/14/2019    Anemia work-up (8/14/2019) showed serum iron 22, TIBC 371, iron % saturation 6, ferritin 34, reticulocyte count 1.4     Obesity, Class I, BMI 30-34.9     Obstructive sleep apnea on CPAP     On statin therapy due to risk of future cardiovascular event 8/14/2019    On Atorvastatin    Primary osteoarthritis of right ankle 8/22/2019    X-ray of the right ankle (8/22/2019) showed no acute fracture or subluxation; advanced degenerative changes at the tibiotalar joint; old fracture fragment vs. accessory ossicle in the inferior aspect of the lateral malleolus; soft tissue swelling around the ankle.  Pure hypercholesterolemia 08/15/2019    Lipid profile (8/13/2019) showed , , HDL 42, ; Lipid profile (8/14/2019) showed , , HDL 39, LDL 94    Received intravenous tissue plasminogen activator (tPA) in emergency department 8/12/2019    Refusal of statin medication by patient 8/13/2019    As per note of Neurology (Dr. Kiera Gunter), patient refuses statin agent because of potential side effects.      Right hemiparesis ([facility] [address] 8/12/2019    Secondary hyperparathyroidism of renal origin ([facility] [address] 8/18/2019    PTH (8/18/2019) = 101.7    Type 2 diabetes mellitus with stage 2 chronic kidney disease (HCC)     HbA1c (8/13/2019) = 6.6 no meds    Vitamin B12 deficiency anemia 8/14/2019    Vitamin B12 (8/14/2019) = 208    Vitamin D deficiency 8/19/2019    Vitamin D 25-Hydroxy (8/19/2019) = 16.2        PSH:   Past Surgical History:   Procedure Laterality Date    COLONOSCOPY N/A 4/15/2019    COLONOSCOPY performed by Severiano Grieves, [facility] at [facility] ENDOSCOPY    HX TONSILLECTOMY         Social HX:   Social History     Socioeconomic History    Marital status:      Spouse name: Not on file    Number of children: Not on file    Years of education: Not on file    Highest education level: Not on file   Occupational History    Not on file   Tobacco Use    Smoking status: Former Smoker    Smokeless tobacco: Never Used   Substance and Sexual Activity    Alcohol use: No     Alcohol/week: 0.0 standard drinks    Drug use: No    Sexual activity: Not on file   Other Topics Concern    Not on file   Social History Narrative    Not on file     Social Determinants of Health     Financial Resource Strain:     Difficulty of Paying Living Expenses: Not on file   Food Insecurity:     Worried About Running Out of Food in the Last Year: Not on file   Brooks Stinson Ran Out of Food in the Last Year: Not on file   Transportation Needs:     Lack of Transportation (Medical): Not on file    Lack of Transportation (Non-Medical): Not on file   Physical Activity:     Days of Exercise per Week: Not on file    Minutes of Exercise per Session: Not on file   Stress:     Feeling of Stress : Not on file   Social Connections:     Frequency of Communication with Friends and Family: Not on file    Frequency of Social Gatherings with Friends and Family: Not on file    Attends Gnosticism Services: Not on file    Active Member of [address] [place] [address] [employer] or Organizations: Not on file    Attends Club or Organization Meetings: Not on file    Marital Status: Not on file   Intimate Partner Violence:     Fear of Current or Ex-Partner: Not on file    Emotionally Abused: Not on file    Physically Abused: Not on file    Sexually Abused: Not on file   Housing Stability:     Unable to Pay for Housing in the Last Year: Not on file    Number of [place] in the Last Year: Not on file    Unstable Housing in the Last Year: Not on file       FHX:   Family History   Problem Relation Age of Onset    Cancer Mother         Gastric cancer    Heart Disease Father     Prostate Cancer Father        Allergy:   No Known Allergies    Home Medications:     Medications Prior to Admission   Medication Sig    finasteride (PROSCAR) 5 mg tablet Take 1 Tablet by mouth daily. Indications: enlarged prostate with urination problem    MULTIVITAMIN PO Take 1 Tablet by mouth daily.  tiZANidine (ZANAFLEX) 4 mg tablet TAKE 1 TABLET BY MOUTH EVERYDAY AT BEDTIME    spironolactone (ALDACTONE) 25 mg tablet TAKE 1 TABLET BY MOUTH TWICE A DAY    Klor-Con M20 20 mEq tablet TAKE 1 TABLET BY MOUTH ONCE A DAY. DO NOT CRUSH OR CHEW.  olmesartan (BENICAR) 40 mg tablet Take 40 mg by mouth daily.  hydroCHLOROthiazide (HYDRODIURIL) 25 mg tablet Take 25 mg by mouth.     sertraline (ZOLOFT) 50 mg tablet     carvediloL (COREG) 12.5 mg tablet Take 12.5 mg by mouth.  ferrous sulfate 325 mg (65 mg iron) tablet Take 1 Tab by mouth daily (with breakfast).  hydrALAZINE (APRESOLINE) 25 mg tablet Take 3 Tabs by mouth every eight (8) hours. Indications: high blood pressure (Patient taking differently: Take 50 mg by mouth every eight (8) hours. Takes 2 tabs TID  Indications: high blood pressure)    aspirin 81 mg chewable tablet Take 1 Tab by mouth daily (with breakfast). Indications: stroke prevention    atorvastatin (LIPITOR) 80 mg tablet Take 1 Tab by mouth nightly. Indications: excessive fat in the blood, stroke prevention    amLODIPine (NORVASC) 10 mg tablet Take 10 mg by mouth daily.  glucose blood VI test strips (ASCENSIA AUTODISC VI, ONE TOUCH ULTRA TEST VI) strip Use one strip with glucometer daily in AM before breakfast and PRN (E11.21)    lancets misc Prick finger with lancet once a day in AM before breakfast or PRN to monitor blood sugar (E11.21)       Review of Systems:     Constitutional: No fevers, chills, weight loss, fatigue. Skin: No rashes, pruritis, jaundice, ulcerations, erythema. HENT: No headaches, nosebleeds, sinus pressure, rhinorrhea, sore throat. Eyes: No visual changes, blurred vision, eye pain, photophobia, jaundice. Cardiovascular: No chest pain, heart palpitations. Respiratory: No cough, SOB, wheezing, chest discomfort, orthopnea. Gastrointestinal: Neg unless noted otherwise in H&P   Genitourinary: No dysuria, bleeding, discharge, pyuria. Musculoskeletal: No weakness, arthralgias, wasting. Endo: No sweats. Heme: No bruising, easy bleeding. Allergies: As noted. Neurological: Cranial nerves intact. Alert and oriented. Gait not assessed. Psychiatric:  No anxiety, depression, hallucinations.                  Visit Vitals  Ht 5' 9\" (1.753 m)   Wt 98.4 kg (217 lb)   BMI 32.05 kg/m²       Physical Assessment:     constitutional: appearance: well developed, well nourished, normal habitus, no deformities, in no acute distress. skin: inspection: no rashes, ulcers, icterus or other lesions; no clubbing or telangiectasias. palpation: no induration or subcutaneos nodules. eyes: inspection: normal conjunctivae and lids; no jaundice pupils: symmetrical, normoreactive to light, normal accommodation and size. ENMT: mouth: normal oral mucosa,lips and gums; good dentition. oropharynx: normal tongue, hard and soft palate; posterior pharynx without erithema, exudate or lesions. neck: thyroid: normal size, consistency and position; no masses or tenderness. respiratory: effort: normal chest excursion; no intercostal retraction or accessory muscle use. cardiovascular: abdominal aorta: normal size and position; no bruits. palpation: PMI of normal size and position; normal rhythm; no thrill or murmurs. abdominal: abdomen: normal consistency; no tenderness or masses. hernias: no hernias appreciated. liver: normal size and consistency. spleen: not palpable. rectal: hemoccult/guaiac: not performed. musculoskeletal: digits and nails: no clubbing, cyanosis, petechiae or other inflammatory conditions. gait: normal gait and station head and neck: normal range of motion; no pain, crepitation or contracture. spine/ribs/pelvis: normal range of motion; no pain, deformity or contracture. lymphatic: axilae: not palpable. groin: not palpable. neck: within normal limits. other: not palpable. neurologic: cranial nerves: II-XII normal.   psychiatric: judgement/insight: within normal limits. memory: within normal limits for recent and remote events. mood and affect: no evidence of depression, anxiety or agitation. orientation: oriented to time, space and person. Basic Metabolic Profile   No results for input(s): NA, K, CL, CO2, BUN, GLU, CA, MG, PHOS in the last 72 hours.     No lab exists for component: CREAT      CBC w/Diff    No results for input(s): WBC, RBC, HGB, HCT, MCV, MCH, MCHC, RDW, PLT, HGBEXT, HCTEXT, PLTEXT in the last 72 hours. No lab exists for component: MPV No results for input(s): GRANS, LYMPH, EOS, PRO, MYELO, METAS, BLAST in the last 72 hours. No lab exists for component: MONO, BASO     Hepatic Function   No results for input(s): ALB, TP, TBILI, AP, AML, LPSE in the last 72 hours. No lab exists for component: DBILI, GPT, SGOT       Neal Yan MD  [facility], [address] [place] [address]  [email or web address] [facility]

## 2021-12-20 NOTE — ANESTHESIA POSTPROCEDURE EVALUATION
Procedure(s):  COLONOSCOPY with polypectomies.     MAC    Anesthesia Post Evaluation      Multimodal analgesia: multimodal analgesia used between 6 hours prior to anesthesia start to PACU discharge  Patient location during evaluation: bedside  Patient participation: complete - patient participated  Level of consciousness: awake  Pain score: 0  Pain management: adequate  Airway patency: patent  Anesthetic complications: no  Cardiovascular status: stable  Respiratory status: acceptable  Hydration status: acceptable  Post anesthesia nausea and vomiting:  controlled  Final Post Anesthesia Temperature Assessment:  Normothermia (36.0-37.5 degrees C)      INITIAL Post-op Vital signs:   Vitals Value Taken Time   /57 12/20/21 1233   Temp     Pulse 63 12/20/21 1233   Resp 16 12/20/21 1233   SpO2 100 % 12/20/21 1233

## 2022-03-18 PROBLEM — N25.81 SECONDARY HYPERPARATHYROIDISM OF RENAL ORIGIN (HCC): Status: ACTIVE | Noted: 2019-08-18

## 2022-03-18 PROBLEM — G81.91 RIGHT HEMIPARESIS (HCC): Status: ACTIVE | Noted: 2019-08-12

## 2022-03-18 PROBLEM — Z79.82 CURRENT USE OF ASPIRIN: Status: ACTIVE | Noted: 2019-08-14

## 2022-03-18 PROBLEM — Z78.9 IMPAIRED MOBILITY AND ADLS: Status: ACTIVE | Noted: 2019-08-12

## 2022-03-18 PROBLEM — Z74.09 IMPAIRED MOBILITY AND ADLS: Status: ACTIVE | Noted: 2019-08-12

## 2022-03-19 PROBLEM — E55.9 VITAMIN D DEFICIENCY: Status: ACTIVE | Noted: 2019-08-19

## 2022-03-19 PROBLEM — E78.00 PURE HYPERCHOLESTEROLEMIA: Status: ACTIVE | Noted: 2019-08-15

## 2022-03-19 PROBLEM — I63.9 ACUTE ISCHEMIC STROKE (HCC): Status: ACTIVE | Noted: 2019-08-12

## 2022-03-19 PROBLEM — I44.0 FIRST DEGREE ATRIOVENTRICULAR BLOCK BY ELECTROCARDIOGRAM: Status: ACTIVE | Noted: 2019-08-12

## 2022-03-19 PROBLEM — Z79.899 ON STATIN THERAPY DUE TO RISK OF FUTURE CARDIOVASCULAR EVENT: Status: ACTIVE | Noted: 2019-08-14

## 2022-03-19 PROBLEM — R47.1 DYSARTHRIA: Status: ACTIVE | Noted: 2019-08-12

## 2022-03-19 PROBLEM — D50.9 IRON DEFICIENCY ANEMIA: Status: ACTIVE | Noted: 2019-08-14

## 2022-03-19 PROBLEM — R13.10 DYSPHAGIA: Status: ACTIVE | Noted: 2019-08-12

## 2022-03-19 PROBLEM — N18.2 CKD (CHRONIC KIDNEY DISEASE) STAGE 2, GFR 60-89 ML/MIN: Status: ACTIVE | Noted: 2019-08-15

## 2022-03-19 PROBLEM — D72.829 LEUKOCYTOSIS: Status: ACTIVE | Noted: 2019-08-16

## 2022-03-19 PROBLEM — Z53.20 REFUSAL OF STATIN MEDICATION BY PATIENT: Status: ACTIVE | Noted: 2019-08-13

## 2022-03-20 PROBLEM — Z92.82 RECEIVED INTRAVENOUS TISSUE PLASMINOGEN ACTIVATOR (TPA) IN EMERGENCY DEPARTMENT: Status: ACTIVE | Noted: 2019-08-12

## 2022-03-20 PROBLEM — M19.071 PRIMARY OSTEOARTHRITIS OF RIGHT ANKLE: Status: ACTIVE | Noted: 2019-08-22

## 2022-03-20 PROBLEM — D51.9 VITAMIN B12 DEFICIENCY ANEMIA: Status: ACTIVE | Noted: 2019-08-14

## 2022-03-23 ENCOUNTER — HOSPITAL ENCOUNTER (OUTPATIENT)
Dept: PHYSICAL THERAPY | Age: 67
Discharge: HOME | End: 2022-03-23
Payer: MEDICARE

## 2022-03-23 PROCEDURE — 97161 PT EVAL LOW COMPLEX 20 MIN: CPT

## 2022-03-23 PROCEDURE — 97110 THERAPEUTIC EXERCISES: CPT

## 2022-03-23 NOTE — PROGRESS NOTES
[facility]  Ashwini Mike  [address] [facility]  [phone] fax    Plan of Care/ Statement of Necessity for Physical Therapy Services    Patient name: Earl Coon Start of Care: 3/23/2022   Referral source: Gray Miranda NP : 1955    Medical Diagnosis: Weakness of left lower extremity [R29.898]  Weakness of right lower extremity [R29.898]  Payor: [place] MEDICARE / Plan: [place] MEDICARE PART A & B / Product Type: Medicare /  Onset Date:    Treatment Diagnosis: LE Weakness   Prior Hospitalization: see medical history Provider#: [id number]   Medications: Verified on Patient summary List    Comorbidities: Stroke 2019 w/Right Hemiparesis, Arthritis, HBP. Prior Level of Function: Ambulates with SPC. Lives with wife in a 1 story house. Takes daily walks in his yard and has a [employer] for inside workouts. The Plan of Care and following information is based on the information from the initial evaluation. Assessment/ key information: Pt is a 78 yr old male with CC of LE Weakness, Right>Left. PMHx: CVA with right Hemiparesis 2019. Pt denies any pain, reports no falls. He reports that he takes daily walks around his yard for exercise. Pt uses a SPC for Ambulation. He utilizes compensatory pelvic elevation with gait which is contributing to extensor synergy and decreased ability to flex knee for foot clearance during swing phase. Decreased gastroc/soleus flexibility as well as synergy pattern is decreasing DF mobility and heel strike on right with ambulation. Romberg EO/WBOS x 30 sec/ EC=unstable. Pt will benefit from skilled PT to address strength, ROM, flexibility, balance, and functional mobility deficits in order to maximize independence, improve ease/safety with ambulation, and improve QOL.      Evaluation Complexity History MEDIUM  Complexity : 1-2 comorbidities / personal factors will impact the outcome/ POC ; Examination MEDIUM Complexity : 3 Standardized tests and measures addressing body structure, function, activity limitation and / or participation in recreation  ;Presentation MEDIUM Complexity : Evolving with changing characteristics  ; Clinical Decision Making MEDIUM Complexity : FOTO score of 26-74  Overall Complexity Rating: MEDIUM  Problem List: pain affecting function, decrease ROM, decrease strength, impaired gait/ balance, decrease ADL/ functional abilitiies, decrease activity tolerance, decrease flexibility/ joint mobility and decrease transfer abilities   Treatment Plan may include any combination of the following: Therapeutic exercise, Therapeutic activities, Neuromuscular re-education, Physical agent/modality, Gait/balance training, Manual therapy, Patient education, Self Care training, Functional mobility training, Home safety training and Stair training  Patient / Family readiness to learn indicated by: asking questions, trying to perform skills and interest  Persons(s) to be included in education: patient (P)  Barriers to Learning/Limitations: None  Patient Goal (s): Able to walk better.   Patient Self Reported Health Status: good  Rehabilitation Potential: good    Short Term Goals: To be accomplished in 1 weeks:   1. Pt will be compliant with a HEP to improve function. Status at [facility]: Issued  Long Term Goals: To be accomplished in 4 weeks:   1. Pt will increase FOTO score by 11 pts to improve function. Status at [facility]:  FOTO=50 pts     2. Pt will improve Right Hip, quad and ankle DF strength to 4/5 to ease with  gait mechanics. Status at [facility]:Right Ankle DF=4-/5 , quad=4/5, HS=3/5      3. Pt will ambulate 4 steps with 1 HR and SPC with supervision to be able to Navigate steps on his upcoming Cruise. Status at [facility]: Pt has 1 step at home. Status at [facility]: Verbalizes challenged with steps.       4. Pt will demonstrate Romberg EC on a compliant surface for 15 seconds without LOB in order to demonstrate improved stability in poorly lit environments. Status at Eval: Romberg EO x 30 sec with WBOS./Floor      5. Pt will ambulate >150' with SPC and no evidence of instability in order to improve ease of household negotiation with LRAD. Frequency / Duration: Patient to be seen 2-3 times per week for 4 weeks. Patient/ Caregiver education and instruction: Diagnosis, prognosis, exercises   [x]  Plan of care has been reviewed with PTA    Certification Period: 3/23/22-4/21/22  Maryam Lee, PT 3/23/2022 12:48 PM    ________________________________________________________________________    I certify that the above Therapy Services are being furnished while the patient is under my care. I agree with the treatment plan and certify that this therapy is necessary.     Physician's Signature:____________Date:_________TIME:________     Lauren Newby NP  ** Signature, Date and Time must be completed for valid certification **    Please sign and return to [facility] [address]  [phone] fax

## 2022-03-23 NOTE — PROGRESS NOTES
PT DAILY TREATMENT NOTE     Patient Name: Sharon Golden  Date:3/23/2022  : 1955  [x]  Patient  Verified  Payor: VA MEDICARE / Plan: [place] MEDICARE PART A & B / Product Type: Medicare /    In time:945  Out time:1030  Total Treatment Time (min): 45  Visit #: 1 of 12    Medicare/BCBS Only   Total Timed Codes (min):  25 1:1 Treatment Time:  25       Treatment Area: Weakness of left lower extremity [R29.898]  Weakness of right lower extremity [R29.898]    SUBJECTIVE  Pain Level (0-10 scale): 0/10  Any medication changes, allergies to medications, adverse drug reactions, diagnosis change, or new procedure performed?: [x] No    [] Yes (see summary sheet for update)  Subjective functional status/changes:   [] No changes reported  See eval    OBJECTIVE     15 min []Eval                  []Re-Eval       25 min Therapeutic Exercise:  [] See flow sheet :   Rationale: increase ROM, increase strength, improve coordination and improve balance to improve the patients ability to ease with adl's/ambulation          With   [] TE   [] TA   [] neuro   [] other: Patient Education: [x] Review HEP    [] Progressed/Changed HEP based on:   [] positioning   [] body mechanics   [] transfers   [] heat/ice application    [] other:      Other Objective/Functional Measures: see eval     Pain Level (0-10 scale) post treatment: 0/10    ASSESSMENT/Changes in Function: see eval    Patient will continue to benefit from skilled PT services to modify and progress therapeutic interventions, address functional mobility deficits, address ROM deficits, address strength deficits, analyze and address soft tissue restrictions, analyze and cue movement patterns, analyze and modify body mechanics/ergonomics, assess and modify postural abnormalities and address imbalance/dizziness to attain remaining goals.      [x]  See Plan of Care  []  See progress note/recertification  []  See Discharge Summary         Progress towards goals / Updated goals:  See poc    PLAN  [x]  Upgrade activities as tolerated     [x]  Continue plan of care  []  Update interventions per flow sheet       []  Discharge due to:_  []  Other:_      Celio Wilkinson, PT 3/23/2022  12:43 PM    Future Appointments   Date Time Provider Marc Ruvalcaba   3/24/2022 12:00 PM Latosha Shutter, PTA [facility]   3/30/2022  1:30 PM Latosha Shutter, PTA [facility]   4/1/2022  2:15 PM Latosha Shutter, PTA [facility]   4/6/2022 11:15 AM Latosha Shutter, PTA [facility]   4/8/2022 10:30 AM Latosha Shutter, PTA [facility]   4/12/2022 12:45 PM Chiquita CORONADO, PT [facility]   4/15/2022 12:45 PM Latosha Shutter, PTA [facility]   4/18/2022 10:30 AM Almon Dieter, PT [facility]   4/20/2022 10:30 AM Almon Dieter, PT [facility]   4/22/2022 11:40 AM Mikaela Andersen MD [facility]   4/26/2022 10:30 AM Latosha Shutter, PTA [facility]   4/28/2022 11:15 AM Latosha Shutter, PTA [facility]   5/2/2022 10:30 AM Almon Dieter, PT [facility]   5/5/2022 10:30 AM Latosha Shutter, PTA [facility]   5/9/2022 10:30 AM Almon Dieter, PT [facility]   5/12/2022 10:30 AM Latosha Collins PTA [facility]   8/30/2022 10:00 AM Allen Monteiro   9/6/2022 10:00 AM PRANAY Cota

## 2022-03-24 ENCOUNTER — HOSPITAL ENCOUNTER (OUTPATIENT)
Dept: PHYSICAL THERAPY | Age: 67
Discharge: HOME | End: 2022-03-24
Payer: MEDICARE

## 2022-03-24 PROCEDURE — 97112 NEUROMUSCULAR REEDUCATION: CPT

## 2022-03-24 PROCEDURE — 97110 THERAPEUTIC EXERCISES: CPT

## 2022-03-24 NOTE — PROGRESS NOTES
PT DAILY TREATMENT NOTE     Patient Name: Christy Sahu  Date:3/24/2022  : 1955  [x]  Patient  Verified  Payor: Milvia Preston / Plan: [place] MEDICARE PART A & B / Product Type: Medicare /    In time: 12:00 Out time: 12:45  Total Treatment Time (min): 45  Visit #: 2 of 12    Medicare/BCBS Only   Total Timed Codes (min):  45 1:1 Treatment Time:  45       Treatment Area: Weakness of left lower extremity [R29.898]  Weakness of right lower extremity [R29.898]    SUBJECTIVE  Pain Level (0-10 scale): 0/10  Any medication changes, allergies to medications, adverse drug reactions, diagnosis change, or new procedure performed?: [x] No    [] Yes (see summary sheet for update)  Subjective functional status/changes:   [] No changes reported  Pt reports he is hoping to get some strength and work on his walk so he doesn't drag his toe or turn it out as much. He kept up with some strength training but needs more.     OBJECTIVE    30 min Therapeutic Exercise:  [x] See flow sheet :   Rationale: increase ROM, increase strength, improve coordination, improve balance and increase proprioception to improve the patients ability to ambulate with improved gait     15 min Neuromuscular Re-education:  [x]  See flow sheet :   Rationale: increase ROM, increase strength, improve coordination, improve balance and increase proprioception  to improve the patients ability to ambulate without synergy          With   [] TE   [] TA   [] neuro   [] other: Patient Education: [x] Review HEP    [] Progressed/Changed HEP based on:   [] positioning   [] body mechanics   [] transfers   [] heat/ice application    [] other:      Other Objective/Functional Measures:   Hip hike compensatory during gait with lack of knee flexion causing increased extensor synergy  Genu recurvatum right midstance to heel off  Left weight shift with sit to stands  Decreased TKE  Added BBK for all supine exercises to improve adductor activation  Verbal and tactile cues to increase right side awareness with exercises  Challenged with prone HSC used left to assist with right with eccentric lower     Pain Level (0-10 scale) post treatment: 0/10    ASSESSMENT/Changes in Function: Pt with habitual gait pattern of right hip hike for foot clearance further increasing synergy pattern of extension as he is unable to dissociate movement patterns. He has decreased TKE strength and utilizes genu recurvatum from midstance to heel off. He will benefit from progression of right LE weakness and work on improved gait pattern out of synergy for decreased fall risk. Patient will continue to benefit from skilled PT services to modify and progress therapeutic interventions, address functional mobility deficits, address ROM deficits, address strength deficits, analyze and address soft tissue restrictions, analyze and cue movement patterns, analyze and modify body mechanics/ergonomics, assess and modify postural abnormalities, address imbalance/dizziness and instruct in home and community integration to attain remaining goals. [x]  See Plan of Care  []  See progress note/recertification  []  See Discharge Summary         Progress towards goals / Updated goals:  Short Term Goals: To be accomplished in 1 weeks:              1. Pt will be compliant with a HEP to improve function. Status at [facility]: Issued  Long Term Goals: To be accomplished in 4 weeks:              1. Pt will increase FOTO score by 11 pts to improve function. Status at [facility]:  FOTO=50 pts                 2. Pt will improve Right Hip, quad and ankle DF strength to 4/5 to ease with  gait mechanics. Status at [facility]:Right Ankle DF=4-/5 , quad=4/5, HS=3/5                            3. Pt will ambulate 4 steps with 1 HR and SPC with supervision to be able to Navigate steps on his upcoming Cruise. Status at Eval: Pt has 1 step at home.    Status at [facility]: Verbalizes challenged with steps.                  4. Pt will demonstrate Romberg EC on a compliant surface for 15 seconds without LOB in order to demonstrate improved stability in poorly lit environments.   Status at Eval: Romberg EO x 30 sec with WBOS./Floor                  5. Pt will ambulate >150' with SPC and no evidence of instability in order to improve ease of household negotiation with LRAD.     PLAN  [x]  Upgrade activities as tolerated     [x]  Continue plan of care  []  Update interventions per flow sheet       []  Discharge due to:_  []  Other:_      Tiara Lopez PTA 3/24/2022  10:12 AM    Future Appointments   Date Time Provider Marc Ruvalcaba   3/24/2022 12:00 PM Adam Gagnon, PTA [facility]   3/30/2022  1:30 PM Adam Gagnon, PTA [facility]   4/1/2022  2:15 PM Adam Gagnon, PTA [facility]   4/6/2022 11:15 AM Adam Gagnon, PTA [facility]   4/8/2022 10:30 AM Adam Gagnon, PTA [facility]   4/12/2022 12:45 PM Petr Barajas, PT [facility]   4/15/2022 12:45 PM Adam Gagnon, PTA [facility]   4/18/2022 10:30 AM Ray Thomas, PT [facility]   4/20/2022 10:30 AM Ray Thomas, PT [facility]   4/22/2022 11:40 AM Marbin Pelletier MD [facility]   4/26/2022 10:30 AM Adam Gagnno, PTA [facility]   4/28/2022 11:15 AM Adam Gagnon, PTA [facility]   5/2/2022 10:30 AM Ray Thomas, PT [facility]   5/5/2022 10:30 AM Adam Gagnon, PTA [facility]   5/9/2022 10:30 AM Ray Thomas, PT [facility]   5/12/2022 10:30 AM Adam Gagnon, PTA [facility]   8/30/2022 10:00 AM Allen Monteiro   9/6/2022 10:00 AM Leopoldo Fletcher, PA [address]

## 2022-03-30 ENCOUNTER — HOSPITAL ENCOUNTER (OUTPATIENT)
Dept: PHYSICAL THERAPY | Age: 67
Discharge: HOME | End: 2022-03-30
Payer: MEDICARE

## 2022-03-30 PROCEDURE — 97112 NEUROMUSCULAR REEDUCATION: CPT

## 2022-03-30 NOTE — PROGRESS NOTES
PT DAILY TREATMENT NOTE     Patient Name: Bret Wagner  Date:3/30/2022  : 1955  [x]  Patient  Verified  Payor: VA MEDICARE / Plan: VA MEDICARE PART A & B / Product Type: Medicare /    In time: 1:30  Out time: 2:15  Total Treatment Time (min): 45  Visit #: 3 of 12    Medicare/BCBS Only   Total Timed Codes (min):  45 1:1 Treatment Time: 45        Treatment Area: Weakness of left lower extremity [R29.898]  Weakness of right lower extremity [R29.898]    SUBJECTIVE  Pain Level (0-10 scale): 0/10  Any medication changes, allergies to medications, adverse drug reactions, diagnosis change, or new procedure performed?: [x] No    [] Yes (see summary sheet for update)  Subjective functional status/changes:   [] No changes reported  Pt reports no current pain. He states the left knee does bother him sometimes with standing exercises. He notes working on all the exercises at home and is willing to do whatever it takes to improve his walk. He states his AFO cracked so he got rid of it and was originally told to try to wean off of it and not use it.      OBJECTIVE     45 min Neuromuscular Re-education:  [x]  See flow sheet : see below   Rationale: increase ROM, increase strength, improve coordination, improve balance and increase proprioception  to improve the patients ability to ambulate without synergy          With   [] TE   [] TA   [] neuro   [] other: Patient Education: [x] Review HEP    [] Progressed/Changed HEP based on:   [] positioning   [] body mechanics   [] transfers   [] heat/ice application    [] other:      Other Objective/Functional Measures:   Extensor synergy noted with gait with compensatory hip hike for foot clearance and circumduction with decreased knee flexion during swing and decreased DF at heel strike    Focused initiation of session in // with mirror for visual feedback working to dissociate movement patterns for decreased synergy with tactile cueing at ASIS for anterior pelvic swing versus hip elevation for swing phase    Pt was unable to utilize the anterior pressure for carryover with knee flexion so switched to tactile cueing below the knee for hip depression while working on knee flexion    Noted gluteus medius weakness during standing exercises and slight increase in left knee pain from full weightbearing so performed seated hip abduction with GTB focusing on increased right hip abduction ROM by isolating single leg movements versus bilateral  Progressed to sit to stands with B UE on right distal thigh for increased weight shift and slight forward left foot to reduce tension on the left knee which pt was able to perform from standard chair with good form    Unable to elicit correct gluteus medius activation with standing hip abduction due to compensatory hip hike and TFL utilization so moved to [facility] for continued progression    Worked on D1 flexion/extension PNF progression with tactile cues and resistance in supine and progressed to side lying hip depression to decrease synergy and carried over to clamshells with hip depression and eccentric activation as pt could not perform concentrically without posterior hip rotation and TFL activation    Pt with improved DF activation utilizing supine hip/knee off of the table to simulate swing phase moving from hip extension with knee flexion and DF onto the table with resistance    Provided education for pt to work on standing knee flexion with hip extended and no pelvic elevation at home  Educated on importance of AFO for times of fatigue or increased ambulation to decrease fall risk and reduce toe drag     Pain Level (0-10 scale) post treatment: 0/10    ASSESSMENT/Changes in Function:  Pt remains highly motivated to progress in therapy and is diligent with therapy recommendations and his HEP.  He continues to demonstrate extensor synergy during gait with difficulty dissociating movement patterns to reduce hip elevation requiring the continued use of SPC for ambulation. He has improved sit to stand transfers with increased right weight shift. He will continue to benefit focused work on glute, HS and ankle strengthening out of synergy to improve carryover to normalized gait pattern with LRAD, ease of stair negotiation and transfers with decreased fall risk. Patient will continue to benefit from skilled PT services to modify and progress therapeutic interventions, address functional mobility deficits, address ROM deficits, address strength deficits, analyze and address soft tissue restrictions, analyze and cue movement patterns, analyze and modify body mechanics/ergonomics, assess and modify postural abnormalities, address imbalance/dizziness and instruct in home and community integration to attain remaining goals. [x]  See Plan of Care  [x]  See progress note/recertification  []  See Discharge Summary         Progress towards goals / Updated goals:  Short Term Goals: To be accomplished in 1 weeks:  1. Pt will be compliant with a HEP to improve function. Status at [facility]: Issued  Met  Long Term Goals: To be accomplished in 4 weeks:  1. Pt will increase FOTO score by 11 pts to improve function. Status at [facility]:  FOTO=50 pts   2. Pt will improve Right Hip, quad and ankle DF strength to 4/5 to ease with  gait mechanics. Status at [facility]:Right Ankle DF=4-/5 , quad=4/5, HS=3/5              3. Pt will ambulate 4 steps with 1 HR and SPC with supervision to be able to Navigate steps on his upcoming Cruise. Status at [facility]: Pt has 1 step at home. Status at [facility]: Verbalizes challenged with steps.    4. Pt will demonstrate Romberg EC on a compliant surface for 15 seconds without LOB in order to demonstrate improved stability in poorly lit environments. Status at [facility]: Romberg EO x 30 sec with WBOS./Floor  5. Pt will ambulate >150' with SPC and no evidence of instability in order to improve ease of household negotiation with LRAD.     PLAN  [x] Upgrade activities as tolerated     [x]  Continue plan of care  []  Update interventions per flow sheet       []  Discharge due to:_  []  Other:_      Raquel Gr, PTA 3/30/2022  10:12 AM    Future Appointments   Date Time Provider Marc Ruvalcaba   3/30/2022  1:30 PM Rozell Osler, PTA [facility]   4/1/2022  2:15 PM Rozell Osler, PTA [facility]   4/6/2022 11:15 AM Rozell Osler, PTA [facility]   4/8/2022 10:30 AM Rozell Osler, PTA [facility]   4/12/2022 12:45 PM Sherley Santos, PT [facility]   4/15/2022 12:45 PM Rozell Osler, PTA [facility]   4/18/2022 10:30 AM Paula Albert, PT [facility]   4/20/2022 10:30 AM Paula Albert, PT [id number]   4/22/2022 11:40 AM Carmelo Messina MD [facility]   4/26/2022 10:30 AM Rozell Osler, PTA [facility]   4/28/2022 11:15 AM Rozell Osler, PTA [facility]   5/2/2022 10:30 AM Paula Albert, PT [facility]   5/5/2022 10:30 AM Rozell Osler, PTA [facility]   5/9/2022 10:30 AM Paual Albert, PT [facility]   5/12/2022 10:30 AM Rozell Osler, PTA [facility]   8/30/2022 10:00 AM Allen Cayetano   9/6/2022 10:00 AM PRANAY Ureña

## 2022-04-01 ENCOUNTER — HOSPITAL ENCOUNTER (OUTPATIENT)
Dept: PHYSICAL THERAPY | Age: 67
Discharge: HOME | End: 2022-04-01
Payer: MEDICARE

## 2022-04-01 PROCEDURE — 97112 NEUROMUSCULAR REEDUCATION: CPT

## 2022-04-01 NOTE — PROGRESS NOTES
PT DAILY TREATMENT NOTE     Patient Name: Veronika Perez  Date:2022  : 1955  [x]  Patient  Verified  Payor: Schmidt  / Plan: [place] MED[place] A & B / Product Type: Medicare /    In time:2:10  Out time: 3:00  Total Treatment Time (min): 50  Visit #: 4 of 12    Medicare/BCBS Only   Total Timed Codes (min): 50  1:1 Treatment Time: 50       Treatment Area: Weakness of left lower extremity [R29.898]  Weakness of right lower extremity [R29.898]    SUBJECTIVE  Pain Level (0-10 scale): 0/10  Any medication changes, allergies to medications, adverse drug reactions, diagnosis change, or new procedure performed?: [x] No    [] Yes (see summary sheet for update)  Subjective functional status/changes:   [x] No changes reported  Pt has been completing exercises at home and requests new exercises to try at home. OBJECTIVE     50 min Neuromuscular Re-education:  [x]  See flow sheet : see below   Rationale: increase ROM, increase strength, improve coordination, improve balance and increase proprioception  to improve the patients ability to ambulate without synergy          With   [] TE   [] TA   [x] neuro   [] other: Patient Education: [x] Review HEP    [] Progressed/Changed HEP based on:   [x] positioning   [x] body mechanics   [] transfers   [] heat/ice application    [] other:      Other Objective/Functional Measures:   Pt presents with continued right circumducted gait. Extensor synergy noted with gait with compensatory hip hike for foot clearance and circumduction with decreased knee flexion during swing and decreased DF at heel strike    Focused initiation of session in // with mirror for visual feedback working to dissociate movement patterns for decreased synergy with WS to right LE. Instructed pt in stationary right step up (4\") with left toe tap to assist pt in decreasing hip hike with swing phase.     Instructed pt to perform Right knee extension with DF in standing followed by slight knee flexion in longsitting with DF to assist pt out of extensor synergy. Pt benefited from calf stretch prior to longsitting with DF. Worked on left S/L right HS curls on powder board with tactile cues for Hip extension, depression and knee flexion. Cues to prevent flexor synergy compensation     Worked on D1 and D2 flexion/extension PNF progression with tactile cues and resistance in supine and progressed to side lying hip depression to decrease synergy     Provided education for pt to work on calf stretch with belt at home     Pain Level (0-10 scale) post treatment: 0/10    ASSESSMENT/Changes in Function:   Pt continues to ambulate demonstrating right hip circumduction and left lateral trunk lean with decreased DF and decreased knee flexion on the right. Pt DF with ambulation improved post intervention. Pt would benefit from hip flexor/quad stretch and calf stretch prior to exercises to increase ROM. Pt would benefit from continued hip and knee strengthening exercises to improve gait and allow pt to walk longer distances without fatigue and with improved gait pattern using LRAD. Patient will ontinue to benefit from skilled PT services to modify and progress therapeutic interventions, address functional mobility deficits, address ROM deficits, address strength deficits, analyze and address soft tissue restrictions, analyze and cue movement patterns, analyze and modify body mechanics/ergonomics, assess and modify postural abnormalities, address imbalance/dizziness and instruct in home and community integration to attain remaining goals. [x]  See Plan of Care  [x]  See progress note/recertification  []  See Discharge Summary         Progress towards goals / Updated goals:  Short Term Goals: To be accomplished in 1 weeks:  1. Pt will be compliant with a HEP to improve function. Status at Eval: Issued  Met  Long Term Goals: To be accomplished in 4 weeks:  1.  Pt will increase FOTO score by 11 pts to improve function. Status at Eval:  FOTO=50 pts   2. Pt will improve Right Hip, quad and ankle DF strength to 4/5 to ease with  gait mechanics. Status at Eval:Right Ankle DF=4-/5 , quad=4/5, HS=3/5              3. Pt will ambulate 4 steps with 1 HR and SPC with supervision to be able to Navigate steps on his upcoming Cruise. Status at Eval: Pt has 1 step at home. Status at Eval: Verbalizes challenged with steps. 4. Pt will demonstrate Romberg EC on a compliant surface for 15 seconds without LOB in order to demonstrate improved stability in poorly lit environments. Status at Eval: Romberg EO x 30 sec with WBOS./Floor  5. Pt will ambulate >150' with SPC and no evidence of instability in order to improve ease of household negotiation with LRAD. PLAN  [x]  Upgrade activities as tolerated     [x]  Continue plan of care  []  Update interventions per flow sheet       []  Discharge due to:_  [x]  Other:Educate pt next visit on hip flexion stretch for HEP      BcEMMETT Centeno 4/1/2022  10:12 AM  I was present during the entire treatment, directing and participating in the treatment.    LORIE Garcia     Future Appointments   Date Time Provider Marc Ruvalcaba   4/1/2022  2:15 PM Atul Smith, PTA [facility]   4/6/2022 11:15 AM Atul Smith, PTA [facility]   4/8/2022 10:30 AM Atul Smith, PTA [facility]   4/12/2022 12:45 PM Shirley Leyva, PT [facility]   4/15/2022 12:45 PM Atul Smith, PTA [facility]   4/18/2022 10:30 AM Rhys Maurer, PT [facility]   4/20/2022 10:30 AM Rhys Maurer, PT [facility]   4/22/2022 11:40 AM Tereza Sierra MD [facility]   4/26/2022 10:30 AM Atul Smith, PTA [facility]   4/28/2022 11:15 AM Atul Smith, PTA [facility]   5/2/2022 10:30 AM Rhys Maurer, PT [facility]   5/5/2022 10:30 AM Atul Smith, PTA [facility]   5/9/2022 10:30 AM Rhys Maurer, PT [facility]   5/12/2022 10:30 AM Atul Smith, PTA [facility]   8/30/2022 10:00 AM [facility] NURSE [facility] ADEBAYO LEAVITT   9/6/2022 10:00 AM PRANAY Seo

## 2022-04-06 ENCOUNTER — HOSPITAL ENCOUNTER (OUTPATIENT)
Dept: PHYSICAL THERAPY | Age: 67
Discharge: HOME | End: 2022-04-06
Payer: MEDICARE

## 2022-04-06 PROCEDURE — 97112 NEUROMUSCULAR REEDUCATION: CPT

## 2022-04-06 NOTE — PROGRESS NOTES
PT DAILY TREATMENT NOTE     Patient Name: Samia Alva  Date:2022  : 1955  [x]  Patient  Verified  Payor: [place] MEDICARE / Plan: [place] MEDICARE PART A & B / Product Type: Medicare /    In time: 11:15 Out time: 12:00  Total Treatment Time (min): 45  Visit #: 5 of 12    Medicare/BCBS Only   Total Timed Codes (min): 45 1:1 Treatment Time: 45       Treatment Area: Weakness of left lower extremity [R29.898]  Weakness of right lower extremity [R29.898]    SUBJECTIVE  Pain Level (0-10 scale): 0/10  Any medication changes, allergies to medications, adverse drug reactions, diagnosis change, or new procedure performed?: [x] No    [] Yes (see summary sheet for update)  Subjective functional status/changes:   [x] No changes reported  Pt c/o of tight right side and hip. He requested more exercises at home. OBJECTIVE     45 min Neuromuscular Re-education:  [x]  See flow sheet : see below   Rationale: increase ROM, increase strength, improve coordination, improve balance and increase proprioception  to improve the patients ability to ambulate without synergy          With   [] TE   [] TA   [x] neuro   [] other: Patient Education: [x] Review HEP    [] Progressed/Changed HEP based on:   [x] positioning   [x] body mechanics   [] transfers   [] heat/ice application    [] other:      Other Objective/Functional Measures:     Extensor synergy noted with gait with compensatory hip hike for foot clearance and circumduction with decreased knee flexion during swing and decreased DF at heel strike    Focused initiation of session focusing on hip depression, extension, and knee flexion. Instructed pt in stationary left step up (2\") with right toe tap to assist pt in decreasing hip hike with swing phase, also used tactile cueing on bottom of right foot to assist in hip depression    Worked on left S/L right HS curls on powder board with tactile cues for Hip extension, depression and knee flexion.  Cues to prevent flexor synergy compensation     Worked on D1 and D2 flexion/extension PNF progression with tactile cues and resistance in supine and progressed to side lying hip depression to decrease synergy     Provided education for pt to work on hip flexion stretch and supine hip depressors at home    Pain Level (0-10 scale) post treatment: 0/10    ASSESSMENT/Changes in Function:   Pt continues to ambulate demonstrating right hip circumduction and left lateral trunk lean with decreased DF and decreased knee flexion on the right. Pt hip depression and extenstion with ambulation improved post intervention. Pt would benefit from hip flexor/quad stretch and calf stretch prior to exercises to increase ROM. Pt would benefit from continued hip and knee strengthening exercises to improve gait and allow pt to walk longer distances without fatigue and with improved gait pattern using LRAD. Pt would also benefit from hip depression exercises to decrease synergy and improve efficiency of gait. Patient will ontinue to benefit from skilled PT services to modify and progress therapeutic interventions, address functional mobility deficits, address ROM deficits, address strength deficits, analyze and address soft tissue restrictions, analyze and cue movement patterns, analyze and modify body mechanics/ergonomics, assess and modify postural abnormalities, address imbalance/dizziness and instruct in home and community integration to attain remaining goals. [x]  See Plan of Care  [x]  See progress note/recertification  []  See Discharge Summary         Progress towards goals / Updated goals:  Short Term Goals: To be accomplished in 1 weeks:  1. Pt will be compliant with a HEP to improve function. Status at Eval: Issued  Met  Long Term Goals: To be accomplished in 4 weeks:  1. Pt will increase FOTO score by 11 pts to improve function. Status at Eval:  FOTO=50 pts   2.  Pt will improve Right Hip, quad and ankle DF strength to 4/5 to ease with gait mechanics. Status at Eval:Right Ankle DF=4-/5 , quad=4/5, HS=3/5              3. Pt will ambulate 4 steps with 1 HR and SPC with supervision to be able to Navigate steps on his upcoming Cruise. Status at Eval: Pt has 1 step at home. Status at Eval: Verbalizes challenged with steps. 4. Pt will demonstrate Romberg EC on a compliant surface for 15 seconds without LOB in order to demonstrate improved stability in poorly lit environments. Status at Eval: Romberg EO x 30 sec with WBOS./Floor  5. Pt will ambulate >150' with SPC and no evidence of instability in order to improve ease of household negotiation with LRAD. Extensor synergy noted with gait with compensatory hip hike for foot clearance and circumduction with decreased knee flexion during swing and decreased DF at heel strike (4/6/22)    PLAN  [x]  Upgrade activities as tolerated     [x]  Continue plan of care  []  Update interventions per flow sheet       []  Discharge due to:_  [x]  Other: practice HS strengthening exercises      Patrecia Boast, SPTA 4/6/2022  10:12 AM  I was present during the entire treatment, directing and participating in the treatment.    LORIE Perdomo     Future Appointments   Date Time Provider Marc Ruvalcaba   4/6/2022 11:15 AM Guicho Figueroa PTA [facility]   4/8/2022 10:30 AM Guicho Figueroa PTA [facility]   4/12/2022 12:45 PM [facility]   4/15/2022 12:45 PM Guicho Fig[facility]eroa P[facility]   4/18/2022 10:30 AM Umesh Giraldo PT [facility]   4/20/2022 10:30 AM Umesh Giraldo PT [facility]   4/22/2022 11:40 AM Margie Lomeli MD [facility]   4/26/2022 10:30 AM Guicho Fountain, PTA [facility]   4/28/2022 11:15 AM Guicho Figueroa, PTA [facility]   5/2/2022 10:30 AM Umesh Giraldo, PT [facility]   5/5/2022 10:30 AM Guicho Figueroa, LINDA [facility]   5/9/2022 10:30 AM Umesh Giraldo, PT [facility]   5/12/2022 10:30 AM Guicho Figueroa, PTA [facility]   8/30/2022 10:00 AM [facility]   9/6/2022 10:00 AM PRANAY Lieberman

## 2022-04-08 ENCOUNTER — HOSPITAL ENCOUNTER (OUTPATIENT)
Dept: PHYSICAL THERAPY | Age: 67
Discharge: HOME | End: 2022-04-08
Payer: MEDICARE

## 2022-04-08 PROCEDURE — 97112 NEUROMUSCULAR REEDUCATION: CPT

## 2022-04-08 PROCEDURE — 97110 THERAPEUTIC EXERCISES: CPT

## 2022-04-08 NOTE — PROGRESS NOTES
PT DAILY TREATMENT NOTE     Patient Name: Sonya Grossman  Date:2022  : 1955  [x]  Patient  Verified  Payor: Shiraz Leyva / Plan: [place] MEDICARE PART A & B / Product Type: Medicare /    In time: 10:28  Out time: 11:15   Total Treatment Time (min): 47  Visit #: 6 of 12    Medicare/BCBS Only   Total Timed Codes (min): 47 1:1 Treatment Time: 47       Treatment Area: Weakness of left lower extremity [R29.898]  Weakness of right lower extremity [R29.898]    SUBJECTIVE  Pain Level (0-10 scale): 0/10  Any medication changes, allergies to medications, adverse drug reactions, diagnosis change, or new procedure performed?: [x] No    [] Yes (see summary sheet for update)  Subjective functional status/changes:   [x] No changes reported  Pt reports he would like to build up his walking endurance but he is up to 45 minutes after starting at 15 minutes. He would also like to know if there is a chance he can ever walk without the cane. He notes increase in left hip pain trying to think of dropping his right hip with walking and he feels more off balance that way.      OBJECTIVE    10 min Therapeutic Exercise:  [x]  See flow sheet :    Rationale: increase ROM, increase strength, improve coordination, improve balance and increase proprioception  to improve the patients ability to ambulate without synergy     37 min Neuromuscular Re-education:  [x]  See flow sheet : see below   Rationale: increase ROM, increase strength, improve coordination, improve balance and increase proprioception  to improve the patients ability to ambulate without synergy          With   [] TE   [] TA   [x] neuro   [] other: Patient Education: [x] Review HEP    [] Progressed/Changed HEP based on:   [x] positioning   [x] body mechanics   [] transfers   [] heat/ice application    [] other:      Other Objective/Functional Measures:   Extensor synergy noted with gait with compensatory hip hike for foot clearance and circumduction with decreased knee flexion during swing and decreased DF at heel strike  Discussed with pt likeliness due to longevity of gait pattern since 2019 to be his gait pattern at this time and a bigger focus of balance, functional deficits, and progression towards possibility of gait training without SPC to be main focus versus further progressing trying to gait train out of synergy; pt agreeable    Assessed gait without SPC in //; biggest change compared to with [facility] is shorter left step length due to decreased right midstance strength/stability and locking right knee into genu recurvatum    Focused session on right knee remaining unlocked for all standing interventions  Unable to perform tandem or SLS without UE assist but could perform MSR; educated to add to home program    Added left step taps to a 4\" box with right knee unlocked to improve endurance and stability as well as proprioception  Unable to perform DL heel raise due to right LE weakness but could perform in sitting so educated to put ankle weight across thigh when performing at home (pt has a 6# weight)  Fatigued quickly with right foot forward trying to perform DF out of synergy; therapist assist to perform DF full range    Next moved to table to review sit to stands with right weight shift and eccentric lower with good improvement in right weight shift  Good form with supine bridges just cues to not let left leg be further back than right  Difficulty maintaining right hip in midline to prevent falling into abduction in hooklying position    Good effort given with PNF patterns  Added right leg off table with knee flexion strengthening out of synergy but assistance needed for full range of [address] due to weakness  Then progressed hip extension to hip flexion from table to plinth keeping right knee flexed to avoid hip hike synergy for swing phase progression    Pain Level (0-10 scale) post treatment: 0/10    ASSESSMENT/Changes in Function: Pt remains highly receptive of therapy recommendations and strengthening at home. He ambulates with a right hip hike due to extensor synergy for foot clearance with decreased knee flexion during swing using a SPC. Pt is progressing ambulation endurance using a SPC at home to 45 minutes and performing 25 steps at the house. His main focus will be trying to decrease to no AD for short ambulation distances with main focus being improving right midstance stability without genu recurvatum. Will also continue general strengthening out of synergy with reduction of hip hike for improved balance and decreased fall risk. Patient will ontinue to benefit from skilled PT services to modify and progress therapeutic interventions, address functional mobility deficits, address ROM deficits, address strength deficits, analyze and address soft tissue restrictions, analyze and cue movement patterns, analyze and modify body mechanics/ergonomics, assess and modify postural abnormalities, address imbalance/dizziness and instruct in home and community integration to attain remaining goals. [x]  See Plan of Care  [x]  See progress note/recertification  []  See Discharge Summary         Progress towards goals / Updated goals:  Short Term Goals: To be accomplished in 1 weeks:  1. Pt will be compliant with a HEP to improve function. Status at Eval: Issued  Met    Long Term Goals: To be accomplished in 4 weeks:  1. Pt will increase FOTO score by 11 pts to improve function. Status at [facility]:  FOTO=50 pts     2. Pt will improve Right Hip, quad and ankle DF strength to 4/5 to ease with  gait mechanics. Status at [facility]:Right Ankle DF=4-/5 , quad=4/5, HS=3/5  HS 2-/5 unable to perform full range gravity minimized without flexor synergy (4/6/22)                3. Pt will ambulate 4 steps with 1 HR and SPC with supervision to be able to Navigate steps on his upcoming Cruise. Status at Eval: Pt has 1 step at home. Status at Eval: Verbalizes challenged with steps.   Pt has been negotiating 25 steps at home for exercise but up with left and down with right (4/8/22)      4. Pt will demonstrate Romberg EC on a compliant surface for 15 seconds without LOB in order to demonstrate improved stability in poorly lit environments. Status at Eval: Romberg EO x 30 sec with WBOS./Floor  MSR EO on floor x 60\" with right knee unlocked without LOB (4/8/22)    5. Pt will ambulate >150' with SPC and no evidence of instability in order to improve ease of household negotiation with LRAD. Extensor synergy noted with gait with compensatory hip hike for foot clearance and circumduction with decreased knee flexion during swing and decreased DF at heel strike (4/6/22) Pt ambulating for 45 minutes at home with [facility] with no reports of falls using SPC (4/8/22)    PLAN  [x]  Upgrade activities as tolerated     [x]  Continue plan of care  []  Update interventions per flow sheet       []  Discharge due to:_  [x]  Other: balance on and off foam with knee unlocked to progress stability.        Mendoza Obando PTA 4/8/2022  10:12 AM      Future Appointments   Date Time Provider Marc Ruvalcaba   4/8/2022 10:30 AM Atul Smith PTA [facility]   4/12/2022 12:45 PM Shirley Leyva PT [facility]   4/15/2022 12:45 PM Atul Smith PTA [facility]   4/18/2022 10:30 AM Rhys Maurer PT [facility]   4/20/2022 10:30 AM Rhys Maurer PT [facility]   4/22/2022 11:40 AM Tereza Sierra MD [facility]   4/26/2022 10:30 AM Atul Smith PTA [facility]   4/28/2022 11:15 AM Atul Smith PTA [facility]   5/2/2022 10:30 AM Rhys Maurer, PT [facility]   5/5/2022 10:30 AM Atul Smith, PTA [facility]   5/9/2022 10:30 AM Rhys Maurer, PT [facility]   5/12/2022 10:30 AM Atul Smith, PTA [facility]   8/30/2022 10:00 AM Allen Cayetano   9/6/2022 10:00 AM PRANAY Das

## 2022-04-12 ENCOUNTER — HOSPITAL ENCOUNTER (OUTPATIENT)
Dept: PHYSICAL THERAPY | Age: 67
Discharge: HOME | End: 2022-04-12
Payer: MEDICARE

## 2022-04-12 PROCEDURE — 97112 NEUROMUSCULAR REEDUCATION: CPT

## 2022-04-12 PROCEDURE — 97110 THERAPEUTIC EXERCISES: CPT

## 2022-04-12 NOTE — PROGRESS NOTES
PT DAILY TREATMENT NOTE     Patient Name: Pranav Aguilar  Date:2022  : 1955  [x]  Patient  Verified  Payor: [place] MEDICARE / Plan: [place] MEDICARE PART A & B / Product Type: Medicare /    In time:12:45  Out time:1:30  Total Treatment Time (min): 45  Visit #: 7 of 12    Medicare/BCBS Only   Total Timed Codes (min):  45 1:1 Treatment Time:  43       Treatment Area: Weakness of left lower extremity [R29.898]  Weakness of right lower extremity [R29.898]    SUBJECTIVE  Pain Level (0-10 scale): 0/10  Any medication changes, allergies to medications, adverse drug reactions, diagnosis change, or new procedure performed?: [x] No    [] Yes (see summary sheet for update)  Subjective functional status/changes:   [] No changes reported  Pt reports that therapy is really helping. He couldn't remember the exercises that therapy gave him last visit to perform with HEP. OBJECTIVE      11 min Therapeutic Exercise:  [x] See flow sheet : bridge with BBK, SAQ with BBK, single limb bridge   Rationale: increase ROM, increase strength, improve coordination, improve balance and increase proprioception to improve the patients ability to improve ease of transfers and gait     34 min Neuromuscular Re-education:  [x]  See flow sheet :   Rationale: increase ROM, increase strength, improve coordination, improve balance and increase proprioception  to improve the patients ability to normalize gait and reduce synergy pattern for increased ambulatory tolerance and ease of household negotiation         With   [] TE   [] TA   [] neuro   [] other: Patient Education: [x] Review HEP    [] Progressed/Changed HEP based on:   [] positioning   [] body mechanics   [] transfers   [] heat/ice application    [] other:      Other Objective/Functional Measures:     Reviewed MSR with HEP. Have UE support available to assist with balance if needed.   Reviewed seated heel raise/toe raise with 6# ankle weight across lap and knee extended for HEP  Provided pt with updated printout for HEP: MSR, seated toe raise with knee extended, SAQ with ball squeeze, and sit<>stand with counter support      Focus on right knee remaining unlocked for all standing interventions  No LOB with MSR     Increased difficulty with seated toe raise with knee extended vs. flexed d/t synergy pattern     Decreased range with supine hamstring curl with right LE off plinth for hip extension to strengthen outside of synergy pattern     Pain Level (0-10 scale) post treatment: 0/10    ASSESSMENT/Changes in Function:     Pt is making slow, steady progress towards initial goals in therapy. He remains highly motivated and was provided with updated HEP this visit. Pt continues to demonstrate extensor synergy but is able to perform isolated movements outside of synergy pattern with cuing with reduced range. Cues were provided throughout session to avoid right knee genu recurvatum during standing with therapy interventions. Will continue to address strength, mobility, and balance deficits to improve ease/safety with ambulation and daily tasks. Patient will continue to benefit from skilled PT services to modify and progress therapeutic interventions, address functional mobility deficits, address ROM deficits, address strength deficits, analyze and address soft tissue restrictions, analyze and cue movement patterns, analyze and modify body mechanics/ergonomics, assess and modify postural abnormalities, address imbalance/dizziness and instruct in home and community integration to attain remaining goals. Progress towards goals / Updated goals:  Short Term Goals: To be accomplished in 1 weeks:  1. Pt will be compliant with a HEP to improve function. Status at Eval: [address] be accomplished in 4 weeks:  1. Pt will increase FOTO score by 11 pts to improve function. Status at Eval:  FOTO=50 pts      2.  Pt will improve Right Hip, quad and ankle DF strength to 4/5 to ease with  gait mechanics. Status at [facility]:Right Ankle DF=4-/5 , quad=4/5, HS=3/5  HS 2-/5 unable to perform full range gravity minimized without flexor synergy (4/6/22)                3. Pt will ambulate 4 steps with 1 HR and SPC with supervision to be able to Navigate steps on his upcoming Cruise.         Status at [facility]: Pt has 1 step at home. Status at [facility]: Verbalizes challenged with steps. Pt has been negotiating 25 steps at home for exercise but up with left and down with right (4/8/22)       4. Pt will demonstrate Romberg EC on a compliant surface for 15 seconds without LOB in order to demonstrate improved stability in poorly lit environments. Status at [facility]: Romberg EO x 30 sec with WBOS./Floor  MSR EO on floor x 60\" with right knee unlocked without LOB (4/8/22)     5. Pt will ambulate >150' with SPC and no evidence of instability in order to improve ease of household negotiation with LRAD.  Extensor synergy noted with gait with compensatory hip hike for foot clearance and circumduction with decreased knee flexion during swing and decreased DF at heel strike (4/6/22) Pt ambulating for 45 minutes at home with [facility] with no reports of falls using SPC (4/8/22)    PLAN  [x]  Upgrade activities as tolerated     [x]  Continue plan of care  []  Update interventions per flow sheet       []  Discharge due to:_  []  Other:_      Stephanie Ball, PT 4/12/2022  12:49 PM    Future Appointments   Date Time Provider Marc Ruvalcaba   4/15/2022 12:45 PM Clarisa Carbone PTA [facility]   4/18/2022 10:30 AM Rola Eaton PT [facility]   4/20/2022 10:30 AM Rola Eaton PT [facility]   4/22/2022 11:40 AM Babak Nguyễn MD [facility]   4/26/2022 10:30 AM Clarisa Carbone PTA [facility]   4/28/2022 11:15 AM Clarisa Carbone, PTA [facility]   5/2/2022 10:30 AM Rola Eaton, PT [facility]   5/5/2022 10:30 AM Clarisa Carbone, PTA [facility]   5/9/2022 10:30 AM Rola Eaton, PT [facility] 5/12/2022 10:30 AM Igor Escobedo PTA [facility]   8/30/2022 10:00 AM [facility]   9/6/2022 10:00 AM PRANAY Tavarez

## 2022-04-15 ENCOUNTER — HOSPITAL ENCOUNTER (OUTPATIENT)
Dept: PHYSICAL THERAPY | Age: 67
Discharge: HOME | End: 2022-04-15
Payer: MEDICARE

## 2022-04-15 PROCEDURE — 97110 THERAPEUTIC EXERCISES: CPT

## 2022-04-15 PROCEDURE — 97112 NEUROMUSCULAR REEDUCATION: CPT

## 2022-04-15 NOTE — PROGRESS NOTES
PT DAILY TREATMENT NOTE     Patient Name: Ruddy Masters  Date:4/15/2022  : 1955  [x]  Patient  Verified  Payor: Mila Webster / Plan: [place] MEDICARE PART A & B / Product Type: Medicare /    In time:12:35  Out time: 1:25  Total Treatment Time (min): 50  Visit #: 8 of 12    Medicare/BCBS Only   Total Timed Codes (min):  50 1:1 Treatment Time:  50       Treatment Area: Weakness of left lower extremity [R29.898]  Weakness of right lower extremity [R29.898]    SUBJECTIVE  Pain Level (0-10 scale): 0/10  Any medication changes, allergies to medications, adverse drug reactions, diagnosis change, or new procedure performed?: [x] No    [] Yes (see summary sheet for update)  Subjective functional status/changes:   [] No changes reported  Pt reports that therapy is really helping. He states he has been doing his exercises at home. He wants to be able to walk without his cane. OBJECTIVE      15 min Therapeutic Exercise:  [x] See flow sheet :   Rationale: increase ROM, increase strength, improve coordination, improve balance and increase proprioception to improve the patients ability to improve ease of transfers and gait     35 min Neuromuscular Re-education:  [x]  See flow sheet :   Rationale: increase ROM, increase strength, improve coordination, improve balance and increase proprioception  to improve the patients ability to normalize gait and reduce synergy pattern for increased ambulatory tolerance and ease of household negotiation         With   [] TE   [] TA   [] neuro   [] other: Patient Education: [x] Review HEP    [] Progressed/Changed HEP based on:   [] positioning   [] body mechanics   [] transfers   [] heat/ice application    [] other:      Other Objective/Functional Measures:     Reviewed MSR with HEP.    Challenged with B calf raises in the // bars  Pt is able to perform all LE PNF exercises well   He demonstrated increased S/L HS AROM with powder board  Challenged with ambulation within parallel bars without holding on  Increased DF ROM with rocker exercise and ambulating  MMT    DF left 5/5 right 5/5   knee ext left 5/5 right 5/5    Pain Level (0-10 scale) post treatment: 0/10    ASSESSMENT/Changes in Function:     Pt is making slow, steady progress towards goals in therapy. Pt continues to demonstrate extensor synergy during ambulation but corrects with verbal and tactile cueing. Patient's DF has increased with ambulation. He would benefit from continued gait training, balance activities, calf and HS strengthening to improve safe ambulation. Patient will continue to benefit from skilled PT services to modify and progress therapeutic interventions, address functional mobility deficits, address ROM deficits, address strength deficits, analyze and address soft tissue restrictions, analyze and cue movement patterns, analyze and modify body mechanics/ergonomics, assess and modify postural abnormalities, address imbalance/dizziness and instruct in home and community integration to attain remaining goals. Progress towards goals / Updated goals:  Short Term Goals: To be accomplished in 1 weeks:  1. Pt will be compliant with a HEP to improve function. Status at [facility]: Issued  Met     Long Term Goals: To be accomplished in 4 weeks:  1. Pt will increase FOTO score by 11 pts to improve function. Status at [facility]:  FOTO=50 pts      2. Pt will improve Right Hip, quad and ankle DF strength to 4/5 to ease with  gait mechanics. Status at [facility]:Right Ankle DF=4-/5 , quad=4/5, HS=3/5  HS 2-/5 unable to perform full range gravity minimized without flexor synergy (4/6/22)  Right knee extension: 5/5 right ankle DF 5/5 within available range (4/15/22)                3. Pt will ambulate 4 steps with 1 HR and SPC with supervision to be able to Navigate steps on his upcoming Cruise. Status at [facility]: Pt has 1 step at home. Status at [facility]: Verbalizes challenged with steps.   Pt has been negotiating 25 steps at home for exercise but up with left and down with right (4/8/22)       4. Pt will demonstrate Romberg EC on a compliant surface for 15 seconds without LOB in order to demonstrate improved stability in poorly lit environments. Status at Eval: Romberg EO x 30 sec with WBOS./Floor  MSR EO on floor x 60\" with right knee unlocked without LOB (4/8/22)     5. Pt will ambulate >150' with SPC and no evidence of instability in order to improve ease of household negotiation with LRAD. Extensor synergy noted with gait with compensatory hip hike for foot clearance and circumduction with decreased knee flexion during swing and decreased DF at heel strike (4/6/22) Pt ambulating for 45 minutes at home with [facility] with no reports of falls using SPC (4/8/22)    PLAN  [x]  Upgrade activities as tolerated     [x]  Continue plan of care  []  Update interventions per flow sheet       []  Discharge due to:_  [x]  Other:_ ambulation without [address], SPTA 4/15/2022  12:49 PM  I was present during the entire treatment, directing and participating in the treatment.    LORIE Egan     Future Appointments   Date Time Provider Marc Ruvalcaba   4/15/2022 12:45 PM [facility]   4/18/2022 10:30 AM Abelino Pal, PT [facility]   4/20/2022 10:30 AM Alease Scientologist, PT [facility]   4/26/2022 10:30 AM Zygrajesh Cruz, PTA [facility]   4/28/2022 11:15 AM Zygrajesh Cruz, PTA [facility]   5/2/2022 10:30 AM Alease Scientologist, PT [facility]   5/5/2022 10:30 AM Amirah Cruz, PTA [facility]   5/9/2022 10:30 AM Alease Scientologist, PT [facility]   5/12/2022 10:30 AM Amirah Cruz, PTA [facility]   6/7/2022  8:00 AM Mukesh Jarrett MD [facility]   8/30/2022 10:00 AM [facility] [address]   9/6/2022 10:00 AM PRANAY Lieberman [address] [employer]

## 2022-04-18 ENCOUNTER — HOSPITAL ENCOUNTER (OUTPATIENT)
Dept: PHYSICAL THERAPY | Age: 67
Discharge: HOME | End: 2022-04-18
Payer: MEDICARE

## 2022-04-18 PROCEDURE — 97530 THERAPEUTIC ACTIVITIES: CPT

## 2022-04-18 PROCEDURE — 97112 NEUROMUSCULAR REEDUCATION: CPT

## 2022-04-18 NOTE — PROGRESS NOTES
PT DAILY TREATMENT NOTE     Patient Name: Ray Santana  Date:2022  : 1955  [x]  Patient  Verified  Payor: VA MEDICARE / Plan: VA MEDICARE PART A & B / Product Type: Medicare /    In time:1030  Out time:1113  Total Treatment Time (min): 43  Visit #: 9 of 12    Medicare/BCBS Only   Total Timed Codes (min):  43 1:1 Treatment Time:  43       Treatment Area: Weakness of left lower extremity [R29.898]  Weakness of right lower extremity [R29.898]    SUBJECTIVE  Pain Level (0-10 scale): 0/10  Any medication changes, allergies to medications, adverse drug reactions, diagnosis change, or new procedure performed?: [x] No    [] Yes (see summary sheet for update)  Subjective functional status/changes:   [] No changes reported  Reports improvement so far. OBJECTIVE      20 min Therapeutic Activity:  [] See flow sheet :FOTO   Rationale: increase ROM, increase strength, improve coordination and improve balance to improve the patients ability to ease with adl's    23 min Neuromuscular Re-education:  []  See flow sheet :   Rationale: increase ROM, increase strength, improve coordination and improve balance  to improve the patients ability to ease with ambulation tolerance. With   [] TE   [] TA   [] neuro   [] other: Patient Education: [x] Review HEP    [] Progressed/Changed HEP based on:   [] positioning   [] body mechanics   [] transfers   [] heat/ice application    [] other:      Other Objective/Functional Measures: FOTO=52  Right hip flexion=4/5, left hip flexion=4+/5   Worked on eccentric control, lowering onto 2inch/4 inch step. Sidestepping over small negin in // w/o circumduction , with concert ration on hip flexion. Pt tend to crossover step over negin. Pain Level (0-10 scale) post treatment: 0/10    ASSESSMENT/Changes in Function: see Recert.      Patient will continue to benefit from skilled PT services to modify and progress therapeutic interventions, address functional mobility deficits, address ROM deficits, address strength deficits, analyze and address soft tissue restrictions, analyze and cue movement patterns, analyze and modify body mechanics/ergonomics, assess and modify postural abnormalities and address imbalance/dizziness to attain remaining goals. []  See Plan of Care  [x]  See progress note/recertification  []  See Discharge Summary         Progress towards goals / Updated goals:  Short Term Goals: To be accomplished in 1 weeks:  1. Pt will be compliant with a HEP to improve function. Status at [facility]: [address] be accomplished in 4 weeks:  1. Pt will increase FOTO score by 11 pts to improve function. Status at [facility]:  FOTO=50 pts      2. Pt will improve Right Hip, quad and ankle DF strength to 4/5 to ease with  gait mechanics. Status at [facility]:Right Ankle DF=4-/5 , quad=4/5, HS=3/5  HS 2-/5 unable to perform full range gravity minimized without flexor synergy (4/6/22)  Right knee extension: 5/5 right ankle DF 5/5 within available range (4/15/22)                3. Pt will ambulate 4 steps with 1 HR and SPC with supervision to be able to Navigate steps on his upcoming Cruise.         Status at [facility]: Pt has 1 step at home. Status at [facility]: Verbalizes challenged with steps. Pt has been negotiating 25 steps at home for exercise but up with left and down with right (4/8/22)       4. Pt will demonstrate Romberg EC on a compliant surface for 15 seconds without LOB in order to demonstrate improved stability in poorly lit environments. Status at [facility]: Romberg EO x 30 sec with WBOS./Floor  MSR EO on floor x 60\" with right knee unlocked without LOB (4/8/22)        5. Pt will ambulate >150' with SPC and no evidence of instability in order to improve ease of household negotiation with LRAD.  Extensor synergy noted with gait with compensatory hip hike for foot clearance and circumduction with decreased knee flexion during swing and decreased DF at heel strike (4/6/22) Pt ambulating for 45 minutes at home with [facility] with no reports of falls using SPC (4/8/22)  PLAN  [x]  Upgrade activities as tolerated     [x]  Continue plan of care  []  Update interventions per flow sheet       []  Discharge due to:_  []  Other:_      [place] , PT 4/18/2022  1:04 PM    Future Appointments   Date Time Provider Marc Ruvalcaba   4/20/2022 10:30 AM Gwenith Moises, PT [facility]   4/26/2022 10:30 AM Genette Netters, PTA [facility]   4/28/2022 11:15 AM Genette Netters, PTA [facility]   5/2/2022 10:30 AM Gwenith Moises, PT [facility]   5/5/2022 10:30 AM Genette Netters, PTA [facility]   5/9/2022 10:30 AM Gwenith Moises, PT [facility]   5/12/2022 10:30 AM Genette Netters, PTA [facility]   6/7/2022  8:00 AM Madonna Herrera MD [facility]   8/30/2022 10:00 AM [facility] NURSE NOAM LEAVITT   9/6/2022 10:00 AM PRANAY Gates

## 2022-04-19 NOTE — PROGRESS NOTES
[facility]  [facility]  [place]  [address] [place] [address]  [phone] fax    Continued Plan of Care/ Re-certification for Physical Therapy Services    Patient name: Ross Chu Start of Care: 3/23/2022   Referral source: Armando Moreland NP : 1955               Medical Diagnosis: Weakness of left lower extremity [R29.898]  Weakness of right lower extremity [R29.898]  Payor: VA MEDICARE / Plan: VA MEDICARE PART A & B / Product Type: Medicare /  Onset Date:               Treatment Diagnosis: LE Weakness   Prior Hospitalization: see medical history Provider#: [id number]   Medications: Verified on Patient summary List    Comorbidities: Stroke 2019 w/Right Hemiparesis, Arthritis, HBP. Prior Level of Function: Ambulates with SPC. Lives with wife in a 1 story house. Takes daily walks in his yard and has a [employer] for inside workouts. Visits from Start of Care: 9    Missed Visits: 0    The Plan of Care and following information is based on the patient's current status:  Goal: Pt will be compliant with a HEP to improve function. Status at Eval: Issued  Current Status: met    Goal:Pt will increase FOTO score by 11 pts to improve function. Status at last note/certification:FOTO=50  Current Status: not met . FOTO=52    Goal:Pt will ambulate 4 steps with 1 HR and SPC with supervision to be able to Navigate steps on his upcoming Cruise.      Status at last note/certification:Pt has 1 step at home  Current Status: met Pt has been negotiating 25 steps at home for exercise but up with left and down with right     Goal: Pt will improve Right Hip, quad and ankle DF strength to 4/5 to ease with  gait mechanics. Status at last note/certification:  Current Status: HS 2-/5 unable to perform full range gravity minimized without flexor synergy   Right knee extension: 5/5 right ankle DF 5/5 within available range     4.  Pt will demonstrate Romberg EC on a compliant surface for 15 seconds without LOB in order to demonstrate improved stability in poorly lit environments. Status at Eval: Romberg EO x 30 sec with WBOS./Floor  MSR EO on floor x 60\" with right knee unlocked without LOB     Key functional changes: Improved Static Balance, Stair ambulation. Problems/ barriers to goal attainment: NONE. Problem List: pain affecting function, decrease ROM, decrease strength, impaired gait/ balance, decrease ADL/ functional abilitiies, decrease activity tolerance, decrease flexibility/ joint mobility and decrease transfer abilities    Treatment Plan: Therapeutic exercise, Therapeutic activities, Neuromuscular re-education, Physical agent/modality, Gait/balance training, Manual therapy, Patient education, Self Care training, Functional mobility training, Home safety training and Stair training     Patient Goal (s) has been updated and includes: To improve Ambulation tolerance over various Terrain     Goals for this certification period to be accomplished in 4 weeks:  1. Pt will increase FOTO score by 11 pts to improve function. 2. Pt will ambulate on various surfaces including outdoor terrain w/o LOB to improve ambulation tolerance outdoors to be able to go on Vacation . 3. Pt will improve right Hip flexor strength to 5/5 to ease with stepping strategies and negotiating steps and curbs. Frequency / Duration: Patient to be seen 2 times per week for 4 weeks:    Assessment / Recommendations:Pt is making slow, steady progress towards goals in therapy. Pt continues to demonstrate extensor synergy during ambulation but corrects with verbal and tactile cueing. Patient's DF has increased with ambulation. He would benefit from continued gait training, balance activities, calf and HS strengthening to improve safe ambulation.  PT/PTA        Certification Period: 4/18/22-5/17/22    Steven Headings, PT 4/18/2022 8:36 PM    ________________________________________________________________________  I certify that the above Therapy Services are being furnished while the patient is under my care. I agree with the treatment plan and certify that this therapy is necessary. [] I have read the above and request that my patient continue as recommended.   [] I have read the above report and request that my patient continue therapy with the following changes/special instructions: _______________________________________  [] I have read the above report and request that my patient be discharged from therapy    Physician's Signature:____________Date:_________TIME:________     Josphine Market, NP  ** Signature, Date and Time must be completed for valid certification **    Please sign and return to [facility]  [address] [facility]  [phone] fax

## 2022-04-20 ENCOUNTER — HOSPITAL ENCOUNTER (OUTPATIENT)
Dept: PHYSICAL THERAPY | Age: 67
Discharge: HOME | End: 2022-04-20
Payer: MEDICARE

## 2022-04-20 PROCEDURE — 97110 THERAPEUTIC EXERCISES: CPT

## 2022-04-20 PROCEDURE — 97112 NEUROMUSCULAR REEDUCATION: CPT

## 2022-04-20 NOTE — PROGRESS NOTES
PT DAILY TREATMENT NOTE     Patient Name: Joe Joe  Date:2022  : 1955  [x]  Patient  Verified  Payor: [place] MEDICARE / Plan: [place] MEDICARE PART A & B / Product Type: Medicare /    In AIBF:[id number]  Out time:1116  Total Treatment Time (min): 44  Visit #: 1 of     Medicare/BCBS Only   Total Timed Codes (min):  44 1:1 Treatment Time:  44       Treatment Area: Weakness of left lower extremity [R29.898]  Weakness of right lower extremity [R29.898]    SUBJECTIVE  Pain Level (0-10 scale): 0/10  Any medication changes, allergies to medications, adverse drug reactions, diagnosis change, or new procedure performed?: [x] No    [] Yes (see summary sheet for update)  Subjective functional status/changes:   [] No changes reported  Reports doing better. He feels like therapy is working his muscles. OBJECTIVE      24 min Therapeutic Exercise:  [] See flow sheet :   Rationale: increase ROM, increase strength and improve coordination to improve the patients ability to ease with ambulation function    20 min Neuromuscular Re-education:  []  See flow sheet :   Rationale: increase ROM, increase strength, improve coordination and improve balance  to improve the patients ability to ease with adl's/ balance and ambulation          With   [] TE   [] TA   [] neuro   [] other: Patient Education: [x] Review HEP    [] Progressed/Changed HEP based on:   [] positioning   [] body mechanics   [] transfers   [] heat/ice application    [] other:      Other Objective/Functional Measures: Mod bracing from therapist during SL clams with 3# above knee. Manual resistance on LE during hip extension/fllexion off bed. Increased to 8# LAQ right LE/10 # left   Lunges with asist for foot correction, working rocker posteriorly. Manual hip/piriformis stretches prior to ex's. MSR EC was a challenge with LOB laterally, both sides. Pain Level (0-10 scale) post treatment: 0/10    ASSESSMENT/Changes in Function: Progressing slowly. Pt continues to have synergistic activation during AROM. Pt wants to hopefully get rid of cane. Pt reports compliance with HEP. Patient will continue to benefit from skilled PT services to modify and progress therapeutic interventions, address functional mobility deficits, address ROM deficits, address strength deficits, analyze and address soft tissue restrictions, analyze and cue movement patterns, analyze and modify body mechanics/ergonomics, assess and modify postural abnormalities and address imbalance/dizziness to attain remaining goals. []  See Plan of Care  [x]  See progress note/recertification  []  See Discharge Summary         Progress towards goals / Updated goals:  1. Pt will increase FOTO score by 11 pts to improve function.     2. Pt will ambulate on various surfaces including outdoor terrain w/o LOB to improve ambulation tolerance outdoors to be able to go on Vacation .     3. Pt will improve right Hip flexor strength to 5/5 to ease with stepping strategies and negotiating steps and curbs.      PLAN  [x]  Upgrade activities as tolerated     [x]  Continue plan of care  []  Update interventions per flow sheet       []  Discharge due to:_  []  Other:_      Kirstin Webb, PT 4/20/2022  1:14 PM    Future Appointments   Date Time Provider Marc Ruvalcaba   4/26/2022 10:30 AM Jillian Nelson PTA [facility]   4/28/2022 11:15 AM Jillian Nelson PTA [facility]   5/2/2022 10:30 AM Dori Yanes, PT [facility]   5/5/2022 10:30 AM Jillian Nelson PTA [facility]   5/9/2022 10:30 AM Dori Yanes, PT [facility]   5/12/2022 10:30 AM Jillian Nelson PTA [facility]   6/7/2022  8:00 AM Talita Alexis MD [facility]   8/30/2022 10:00 AM [facility] NURSE SAJAN ADEBAYO LEAVITT   9/6/2022 10:00 AM PRANAY Alston [address]

## 2022-04-26 ENCOUNTER — HOSPITAL ENCOUNTER (OUTPATIENT)
Dept: PHYSICAL THERAPY | Age: 67
Discharge: HOME | End: 2022-04-26
Payer: MEDICARE

## 2022-04-26 PROCEDURE — 97112 NEUROMUSCULAR REEDUCATION: CPT

## 2022-04-26 PROCEDURE — 97110 THERAPEUTIC EXERCISES: CPT

## 2022-04-26 NOTE — PROGRESS NOTES
PT DAILY TREATMENT NOTE     Patient Name: Louise Quinonez  Date:2022  : 1955  [x]  Patient  Verified  Payor: Edward Frye / Plan: [place] MEDICARE PART A & B / Product Type: Medicare /    In time: 10:31 Out time:11:15  Total Treatment Time (min): 44  Visit #:2 of     Medicare/BCBS Only   Total Timed Codes (min):  44 1:1 Treatment Time:  35       Treatment Area: Weakness of left lower extremity [R29.898]  Weakness of right lower extremity [R29.898]    SUBJECTIVE  Pain Level (0-10 scale): 0/10  Any medication changes, allergies to medications, adverse drug reactions, diagnosis change, or new procedure performed?: [x] No    [] Yes (see summary sheet for update)  Subjective functional status/changes:   [] No changes reported  Pt states he walks everyday outside around his neighborhood for one hour. He also states standing in place with feet together while at home is difficult to do and keep balance. OBJECTIVE      34 min Therapeutic Exercise:  [x] See flow sheet :   Rationale: increase ROM, increase strength and improve coordination to improve the patients ability to ease with ambulation function    10 min Neuromuscular Re-education:  [x]  See flow sheet :   Rationale: increase ROM, increase strength, improve coordination and improve balance  to improve the patients ability to ease with adl's/ balance and ambulation          With   [] TE   [] TA   [] neuro   [] other: Patient Education: [x] Review HEP    [] Progressed/Changed HEP based on:   [] positioning   [] body mechanics   [] transfers   [] heat/ice application    [] other:      Other Objective/Functional Measures: Mod bracing from therapist during SL clams with 3# above knee. Performed Weight shifts forward/backward  Added Hurdles in // bars and standing HSC  Pt has significantly improved with PNF patterns     Pain Level (0-10 scale) post treatment: 0/10    ASSESSMENT/Changes in Function: Progressing slowly but highly motivated.  Pt continues to have synergistic activation during AROM. Pt wants to improve on his balance to progress to ambulation without need for [facility]. He benefit from practice with ambulation in // bars to assist in safe ambulation and negotiation in his community. Patient will continue to benefit from skilled PT services to modify and progress therapeutic interventions, address functional mobility deficits, address ROM deficits, address strength deficits, analyze and address soft tissue restrictions, analyze and cue movement patterns, analyze and modify body mechanics/ergonomics, assess and modify postural abnormalities and address imbalance/dizziness to attain remaining goals. []  See Plan of Care  [x]  See progress note/recertification  []  See Discharge Summary         Progress towards goals / Updated goals:  1. Pt will increase FOTO score by 11 pts to improve function. 2. Pt will ambulate on various surfaces including outdoor terrain w/o LOB to improve ambulation tolerance outdoors to be able to go on Vacation . 3. Pt will improve right Hip flexor strength to 5/5 to ease with stepping strategies and negotiating steps and curbs. PLAN  [x]  Upgrade activities as tolerated     [x]  Continue plan of care  []  Update interventions per flow sheet       []  Discharge due to:_  [x]  Other:_   Continue with balance and ambulation (I) without cane, assess ability to stand from floor    Karyle Sing, SPTA 4/26/2022  1:14 PM    I was present during the entire treatment, directing and participating in the treatment.    LORIE Smith     Future Appointments   Date Time Provider Department Center   4/26/2022 10:30 AM Estefania Brennan [facility]   4/28/2022 11:15 AM Blaise Rape, PTA [facility]   5/2/2022 10:30 AM Anju El Tumbao, PT [facility]   5/5/2022 10:30 AM Blaise Rape, PTA [facility]   5/9/2022 10:30 AM Anju El Tumbao, PT [facility]   5/12/2022 10:30 AM Blaise Rape, PTA [facility]   6/7/2022  8:00 AM Mariano Tim MD [facility]   8/30/2022 10:00 AM [facility] NURSE [facility] ADEBAYO SCHED   9/6/2022 10:00 AM PRANAY Montoya [address]

## 2022-04-28 ENCOUNTER — HOSPITAL ENCOUNTER (OUTPATIENT)
Dept: PHYSICAL THERAPY | Age: 67
Discharge: HOME | End: 2022-04-28
Payer: MEDICARE

## 2022-04-28 PROCEDURE — 97112 NEUROMUSCULAR REEDUCATION: CPT

## 2022-04-28 PROCEDURE — 97110 THERAPEUTIC EXERCISES: CPT

## 2022-04-28 NOTE — PROGRESS NOTES
PT DAILY TREATMENT NOTE     Patient Name: Jose E Thomas  Date:2022  : 1955  [x]  Patient  Verified  Payor: Faraz Amos / Plan: VA MEDICARE PART A & B / Product Type: Medicare /    In time: 11:17  Out time: 12:00  Total Treatment Time (min): 43  Visit #: 3 of     Medicare/BCBS Only   Total Timed Codes (min): 43 1:1 Treatment Time: 43       Treatment Area: Weakness of left lower extremity [R29.898]  Weakness of right lower extremity [R29.898]    SUBJECTIVE  Pain Level (0-10 scale): 0/10  Any medication changes, allergies to medications, adverse drug reactions, diagnosis change, or new procedure performed?: [x] No    [] Yes (see summary sheet for update)  Subjective functional status/changes:   [] No changes reported  Pt reports no current pain. He states he is able to get up and down from the floor using his left leg and left UE to pull himself up. He hasn't had any recent falls.        OBJECTIVE    33 min Therapeutic Exercise:  [x] See flow sheet :   Rationale: increase ROM, increase strength and improve coordination to improve the patients ability to ease with ambulation function    10 min Neuromuscular Re-education:  [x]  See flow sheet : balance activities   Rationale: increase ROM, increase strength, improve coordination and improve balance  to improve the patients ability to ease with adl's/ balance and ambulation          With   [] TE   [] TA   [] neuro   [] other: Patient Education: [x] Review HEP    [] Progressed/Changed HEP based on:   [] positioning   [] body mechanics   [] transfers   [] heat/ice application    [] other:      Other Objective/Functional Measures:  Challenged with addition of lunges to break synergy pattern but initiate to work on floor<>stand transfers  Increased difficulty with MSR on foam but improving on floor  Added leg press for LE strengthening but required assistance to keep feet in right position  Able to ambulate short distances without SPC    Pain Level (0-10 scale) post treatment: 0/10    ASSESSMENT/Changes in Function: Pt continues to struggle with right extensor synergy but remains highly motivated to progress strength and balance in therapy. He is verbally able to perform floor<>stand transfers should a fall occur but has not had any recent falls. He demonstrates decreased balance on compliant surfaces. He will continue to benefit from LE strengthening and balance to progress to ambulation without AD with decreased fall risk. Patient will continue to benefit from skilled PT services to modify and progress therapeutic interventions, address functional mobility deficits, address ROM deficits, address strength deficits, analyze and address soft tissue restrictions, analyze and cue movement patterns, analyze and modify body mechanics/ergonomics, assess and modify postural abnormalities and address imbalance/dizziness to attain remaining goals. []  See Plan of Care  [x]  See progress note/recertification  []  See Discharge Summary         Progress towards goals / Updated goals:  1. Pt will increase FOTO score by 11 pts to improve function. 2. Pt will ambulate on various surfaces including outdoor terrain w/o LOB to improve ambulation tolerance outdoors to be able to go on Vacation . 3. Pt will improve right Hip flexor strength to 5/5 to ease with stepping strategies and negotiating steps and curbs.      PLAN  [x]  Upgrade activities as tolerated     [x]  Continue plan of care  []  Update interventions per flow sheet       []  Discharge due to:_  [x]  Other:_   Continue with balance and ambulation (I) without cane    Sherrell Gallegos PTA 4/28/2022  1:14 PM      Future Appointments   Date Time Provider Marc Ruvalcaba   4/28/2022 11:15 AM Amirah Cruz PTA [facility]   5/2/2022 10:30 AM Abelino Pal, PT [facility]   5/5/2022 10:30 AM Amirah Cruz PTA [facility]   5/9/2022 10:30 AM Abelino Pal, PT [facility]   5/12/2022 10:30 AM [facility] [address]   6/7/2022  8:00 AM Dale Lemus MD [facility]   8/30/2022 10:00 AM [facility] NURSE NOAM LEAVITT   9/6/2022 10:00 AM PRANAY Vera [phone]

## 2022-05-02 ENCOUNTER — HOSPITAL ENCOUNTER (OUTPATIENT)
Dept: PHYSICAL THERAPY | Age: 67
Discharge: HOME | End: 2022-05-02
Payer: MEDICARE

## 2022-05-02 PROCEDURE — 97110 THERAPEUTIC EXERCISES: CPT

## 2022-05-02 NOTE — PROGRESS NOTES
PT DAILY TREATMENT NOTE     Patient Name: Jose E Thomas  Date:2022  : 1955  [x]  Patient  Verified  Payor: VA MEDICARE / Plan: VA MEDICARE PART A & B / Product Type: Medicare /    In IVUE:2378  Out time:1113  Total Treatment Time (min): 42  Visit #: 4 of     Medicare/BCBS Only   Total Timed Codes (min):  42 1:1 Treatment Time:  42       Treatment Area: Weakness of left lower extremity [R29.898]  Weakness of right lower extremity [R29.898]    SUBJECTIVE  Pain Level (0-10 scale): 0/10  Any medication changes, allergies to medications, adverse drug reactions, diagnosis change, or new procedure performed?: [x] No    [] Yes (see summary sheet for update)  Subjective functional status/changes:   [] No changes reported  Pt reports he attended the [employer] this weekend and did a lot of walking. He thinks the therapy has helped him a lot to accomplish walking endurance  OBJECTIVE        42 min Therapeutic Exercise:  [] See flow sheet :   Rationale: increase ROM, increase strength, improve coordination and improve balance to improve the patients ability to ease with adl's      With   [] TE   [] TA   [] neuro   [] other: Patient Education: [x] Review HEP    [] Progressed/Changed HEP based on:   [] positioning   [] body mechanics   [] transfers   [] heat/ice application    [] other:      Other Objective/Functional Measures: Initiated rx with supine manual stretching. right HS strength continues to be limited. Right Hip IR is limited and hypomobile. Pt performed right Single leg press with gradual increased weights, 30/40 50# x 20 each. Theraband was used to keep hip from  External rotation while on late. Pain Level (0-10 scale) post treatment: 0/10    ASSESSMENT/Changes in Function: Progressing well. Pt continues to have synergistic activation patterns.  Slow manual stretches with long duration hold and gentle long axis oscillations on right  improved muscle jt tightness to right hip briefly. Patient will continue to benefit from skilled PT services to modify and progress therapeutic interventions, address functional mobility deficits, address ROM deficits, address strength deficits, analyze and address soft tissue restrictions, analyze and cue movement patterns, analyze and modify body mechanics/ergonomics, assess and modify postural abnormalities and address imbalance/dizziness to attain remaining goals. []  See Plan of Care  []  See progress note/recertification  []  See Discharge Summary         Progress towards goals / Updated goals:  1. Pt will increase FOTO score by 11 pts to improve function.     2. Pt will ambulate on various surfaces including outdoor terrain w/o LOB to improve ambulation tolerance outdoors to be able to go on Vacation .     3.  Pt will improve right Hip flexor strength to 5/5 to ease with stepping strategies and negotiating steps and curbs.     PLAN  [x]  Upgrade activities as tolerated     [x]  Continue plan of care  []  Update interventions per flow sheet       []  Discharge due to:_  []  Other:_      Nicholas Flowers, PT 5/2/2022  10:57 AM    Future Appointments   Date Time Provider Marc Ruvalcaba   5/5/2022 10:30 AM Maddie Zapata PTA [facility]   5/9/2022 10:30 AM Liliana Cha PT [facility]   5/12/2022 10:30 AM Maddie Zapata PTA [facility]   6/7/2022  8:00 AM Michael Nguyen MD [facility]   8/30/2022 [facility]   9/6/2022 10:00 AM PRANAY Calvillo

## 2022-05-05 ENCOUNTER — HOSPITAL ENCOUNTER (OUTPATIENT)
Dept: PHYSICAL THERAPY | Age: 67
Discharge: HOME | End: 2022-05-05
Payer: MEDICARE

## 2022-05-05 PROCEDURE — 97112 NEUROMUSCULAR REEDUCATION: CPT

## 2022-05-05 PROCEDURE — 97110 THERAPEUTIC EXERCISES: CPT

## 2022-05-05 NOTE — PROGRESS NOTES
PT DAILY TREATMENT NOTE     Patient Name: Valentina Lopez  Date:2022  : 1955  [x]  Patient  Verified  Payor: Lizzy Park / Plan: VA MEDICARE PART A & B / Product Type: Medicare /    In time: 10:30  Out time: 11:15  Total Treatment Time (min): 45  Visit #: 5 of     Medicare/BCBS Only   Total Timed Codes (min):  45 1:1 Treatment Time:  45       Treatment Area: Weakness of left lower extremity [R29.898]  Weakness of right lower extremity [R29.898]    SUBJECTIVE  Pain Level (0-10 scale): 0/10  Any medication changes, allergies to medications, adverse drug reactions, diagnosis change, or new procedure performed?: [x] No    [] Yes (see summary sheet for update)  Subjective functional status/changes:   [] No changes reported  Pt reports no current pain in his hips or knees. He states walking endurance is improving and he is doing his balance exercises at home. He is doing some walking without the cane at home. He will be gone the week of the  but wants to continue a little longer with strengthening with therapy and balance.     OBJECTIVE    35 min Therapeutic Exercise:  [x] See flow sheet :   Rationale: increase ROM, increase strength, improve coordination and improve balance to improve the patients ability to ease with adls    10 min Neuromuscular Re-education:  [x] See flow sheet :   Rationale: increase ROM, increase strength, improve coordination and improve balance to improve the patients ability to ease with adls      With   [] TE   [] TA   [] neuro   [] other: Patient Education: [x] Review HEP    [] Progressed/Changed HEP based on:   [] positioning   [] body mechanics   [] transfers   [] heat/ice application    [] other:      Other Objective/Functional Measures:   Max cues to prevent TFL compensation with glute exercises  HS cramping with SL bridges so worked on glute pulses for better activation  Added S/L donkey kicks with manual resist both concentric and eccentric portion and tactile cues  Able to progress MSR balance to right toe to left heel almost tandem  Hand taps required for tandem balance  Educated on progression at home for balance and keeping knee unlocked for all standing exercises  Cues to reduce momentum with hip extension off step  Challenged with toe raises due to extensor synergy in standing  Utilizes circumduction and genu recurvatum during gait but compensatory at this point in nature    Pain Level (0-10 scale) post treatment: 0/10     ASSESSMENT/Changes in Function: Pt making steady progress towards updated goals in therapy. He demonstrates improving balance with progression of MSR to almost tandem without LOB. He demonstrates improving right quad control to reduce genu recurvatum for standing static exercises but is unable to carryover to dynamic activities due to compensatory strategy with extensor synergy. Will continue to progress hip strength especially focus on glutes to improve balance on various surfaces to decrease fall risk. Patient will continue to benefit from skilled PT services to modify and progress therapeutic interventions, address functional mobility deficits, address ROM deficits, address strength deficits, analyze and address soft tissue restrictions, analyze and cue movement patterns, analyze and modify body mechanics/ergonomics, assess and modify postural abnormalities and address imbalance/dizziness to attain remaining goals. [x]  See Plan of Care  []  See progress note/recertification  []  See Discharge Summary         Progress towards goals / Updated goals:  1. Pt will increase FOTO score by 11 pts to improve function.    2.  Pt will ambulate on various surfaces including outdoor terrain w/o LOB to improve ambulation tolerance outdoors to be able to go on Vacation.   3. Pt will improve right Hip flexor strength to 5/5 to ease with stepping strategies and negotiating steps and curbs.     PLAN  [x]  Upgrade activities as tolerated     [x]  Continue plan of care  []  Update interventions per flow sheet       []  Discharge due to:_  [x]  Other: perform leg press with foot high on plate for more glute activation      Amee Hernandez, PTA 5/5/2022  10:57 AM    Future Appointments   Date Time Provider Marc Ruvalcaba   5/5/2022 10:30 AM Michel Lovell, PTA [facility]   5/9/2022 10:30 AM Emre Poag, PT [facility]   5/12/2022 10:30 AM Michel Pitch, PTA [facility]   6/7/2022  8:00 AM Priscilla Frazier MD [facility]   8/30/2022 10:00 AM [facility] NURSE NOAM LEAVITT   9/6/2022 10:00 AM PRANAY Vidal

## 2022-05-09 ENCOUNTER — HOSPITAL ENCOUNTER (OUTPATIENT)
Dept: PHYSICAL THERAPY | Age: 67
Discharge: HOME | End: 2022-05-09
Payer: MEDICARE

## 2022-05-09 PROCEDURE — 97110 THERAPEUTIC EXERCISES: CPT

## 2022-05-09 PROCEDURE — 97112 NEUROMUSCULAR REEDUCATION: CPT

## 2022-05-12 ENCOUNTER — HOSPITAL ENCOUNTER (OUTPATIENT)
Dept: PHYSICAL THERAPY | Age: 67
Discharge: HOME | End: 2022-05-12
Payer: MEDICARE

## 2022-05-12 PROCEDURE — 97112 NEUROMUSCULAR REEDUCATION: CPT

## 2022-05-12 PROCEDURE — 97110 THERAPEUTIC EXERCISES: CPT

## 2022-05-12 NOTE — PROGRESS NOTES
PT DAILY TREATMENT NOTE     Patient Name: Nancy Gaffney  Date:2022  : 1955  [x]  Patient  Verified  Payor: [place] MEDICARE / Plan: [place] MEDICARE PART A & B / Product Type: Medicare /    In time:10:35 Out time: 11:19  Total Treatment Time (min): 44  Visit #: 7 of     Medicare/BCBS Only   Total Timed Codes (min): 44 1:1 Treatment Time: 44       Treatment Area: Weakness of left lower extremity [R29.898]  Weakness of right lower extremity [R29.898]    SUBJECTIVE  Pain Level (0-10 scale):  0/10  Any medication changes, allergies to medications, adverse drug reactions, diagnosis change, or new procedure performed?: [x] No    [] Yes (see summary sheet for update)  Subjective functional status/changes:   [x] No changes reported  Pt reports no current pain. He states he will miss next week due to being out of town. He has been working on his exercises at home and notices his balance and endurance improving.      OBJECTIVE    29 min Therapeutic Exercise:  [x] See flow sheet :   Rationale: increase ROM, increase strength, improve coordination and improve balance to improve the patients ability to ease with adls    15 min Neuromuscular Re-education:  [x] See flow sheet : standing balance interventions; core strengthening/endurance   Rationale: increase ROM, increase strength, improve coordination and improve balance to improve the patients ability to ease with adls      With   [] TE   [] TA   [] neuro   [] other: Patient Education: [x] Review HEP    [] Progressed/Changed HEP based on:   [] positioning   [] body mechanics   [] transfers   [] heat/ice application    [] other:      Other Objective/Functional Measures:   Progressed leg press to increase sets and resistance per flow sheet; manual at knee to reduce hip ER  Challenged with mini lunges in // to increase knee flexion on leg that is posterior to reduce forward anterior translation  Assistance for standing toe raises due to extensor synergy  Added ankle x 4 in supine at end to address ankle stability assistance for full range inversion/eversion and to reduce hip compensation  Added core endurance conditioning to improve activation and balance  Able to perform almost tandem without LOB  Added SLS with \"soft knee\" to improve right SLS stability   Continues with compensatory right hip hike trying to perform glute strengthening in standing due to habitual pattern and extensor synergy    Pain Level (0-10 scale) post treatment: 0/10     ASSESSMENT/Changes in Function: Pt progressing towards goals with improving balance and endurance. He has been able to increase ambulation time and progress to almost full tandem without LOB compared to before losing balance with MSR. He was initiated to updated exercises to address core and ankle strength/stability. Will continue with progression of strengthening with good form for carryover to eventual updated final HEP for continued independent strengthening. He is dominated by habitual compensatory extensor synergy in standing for exercises to strengthen right glutes. Patient will continue to benefit from skilled PT services to modify and progress therapeutic interventions, address functional mobility deficits, address ROM deficits, address strength deficits, analyze and address soft tissue restrictions, analyze and cue movement patterns, analyze and modify body mechanics/ergonomics, assess and modify postural abnormalities and address imbalance/dizziness to attain remaining goals. [x]  See Plan of Care  []  See progress note/recertification  []  See Discharge Summary         Progress towards goals / Updated goals:  1. Pt will increase FOTO score by 11 pts to improve function.    2. Pt will ambulate on various surfaces including outdoor terrain w/o LOB to improve ambulation tolerance outdoors to be able to go on Vacation. Pt progressing with ambulation endurance in all capacities at home (5/12/22)  3.  Pt will improve right Hip flexor strength to 5/5 to ease with stepping strategies and negotiating steps and curbs.     PLAN  [x]  Upgrade activities as tolerated     [x]  Continue plan of care  []  Update interventions per flow sheet       []  Discharge due to:_  []  Other:       Yung Castillo PTA 5/12/2022  11:12 AM    Future Appointments   Date Time Provider Marc Ruvalcaba   5/12/2022 10:30 AM Sairanehemiah Erazo, PTA [facility]   5/25/2022  1:30 PM Darcy Goodman, PT [facility]   5/27/2022  2:15 PM Saira Benler, PTA [facility]   6/1/2022 11:15 AM Saira Euler, PTA [facility]   6/3/2022 11:15 AM Saira Euler, PTA [facility]   6/6/2022 11:15 AM Darcy Goodman, PT [facility]   6/7/2022  8:00 AM Grace Segundo MD [facility]   6/8/2022  2:15 PM Saira Kacey, PTA [facility]   6/14/2022 10:30 AM Saira Euler, PTA [facility]   6/17/2022 12:00 PM Darcy Goodman, PT [facility]   6/20/2022  3:00 PM Saira Euler, PTA [facility]   6/23/2022 10:30 AM Saira Euler, PTA [facility]   6/27/2022 10:30 AM Saira Euler, PTA [facility]   6/30/2022 10:30 AM Saira Euler, PTA [facility]   8/30/2022 10:00 AM [facility]   9/6/2022 10:00 AM PRANAY Howard

## 2022-05-25 ENCOUNTER — HOSPITAL ENCOUNTER (OUTPATIENT)
Dept: PHYSICAL THERAPY | Age: 67
Discharge: HOME | End: 2022-05-25
Payer: MEDICARE

## 2022-05-25 PROCEDURE — 97530 THERAPEUTIC ACTIVITIES: CPT

## 2022-05-25 PROCEDURE — 97110 THERAPEUTIC EXERCISES: CPT

## 2022-05-25 PROCEDURE — 97112 NEUROMUSCULAR REEDUCATION: CPT

## 2022-05-25 NOTE — PROGRESS NOTES
PT DAILY TREATMENT NOTE     Patient Name: Valentina Lopez  Date:2022  : 1955  [x]  Patient  Verified  Payor: VA MEDICARE / Plan: VA MEDICARE PART A & B / Product Type: Medicare /    In time:130  Out time:225  Total Treatment Time (min): 55  Visit #: 8 of     Medicare/BCBS Only   Total Timed Codes (min):  55 1:1 Treatment Time:  55       Treatment Area: Weakness of left lower extremity [R29.898]  Weakness of right lower extremity [R29.898]    SUBJECTIVE  Pain Level (0-10 scale): 0/10  Any medication changes, allergies to medications, adverse drug reactions, diagnosis change, or new procedure performed?: [x] No    [] Yes (see summary sheet for update)  Subjective functional status/changes:   [] No changes reported  Reports his endurance is better and getting stronger. His wife would make him carry his dishes to the sink about 8 ft away. He tries to walk w/o his cane but wobbles too much. OBJECTIVE      25 min Therapeutic Exercise:  [] See flow sheet :   Rationale: increase ROM, increase strength and improve coordination to improve the patients ability to ease with adl's    10 min Therapeutic Activity:  []  See flow sheet :FOTO   Rationale: increase ROM, increase strength and improve coordination  to improve the patients ability to ease with adl's     20 min Neuromuscular Re-education:  []  See flow sheet :   Rationale: increase strength, improve coordination, improve balance and increase proprioception  to improve the patients ability to ease with adl's     With   [] TE   [] TA   [] neuro   [] other: Patient Education: [x] Review HEP    [] Progressed/Changed HEP based on:   [] positioning   [] body mechanics   [] transfers   [] heat/ice application    [] other:      Other Objective/Functional Measures: FOTO=57   Left hi flexion=4/5, right hip flexion=4/5  Right HS=4/5, Quad=4/5  Difficulty with EC with modified stance Romberg/MSR.    Leg press, 60#/ 70# , single leg x 15  Pain Level (0-10 scale) post treatment: 0/10    ASSESSMENT/Changes in Function: Pt recommended to use cane due to increased energy required and decreased balance/ stability. He would got to [employer] and walk around with his cane. See recert. Patient will continue to benefit from skilled PT services to modify and progress therapeutic interventions, address functional mobility deficits, address ROM deficits, address strength deficits, analyze and address soft tissue restrictions, analyze and cue movement patterns, analyze and modify body mechanics/ergonomics, assess and modify postural abnormalities and address imbalance/dizziness to attain remaining goals. []  See Plan of Care  []  See progress note/recertification  []  See Discharge Summary         Progress towards goals / Updated goals:  1. Pt will increase FOTO score by 11 pts to improve function.    2. Pt will ambulate on various surfaces including outdoor terrain w/o LOB to improve ambulation tolerance outdoors to be able to go on Vacation. Pt progressing with ambulation endurance in all capacities at home (5/12/22)  3.  Pt will improve right Hip flexor strength to 5/5 to ease with stepping strategies and negotiating steps and curbs.     PLAN  [x]  Upgrade activities as tolerated     [x]  Continue plan of care  []  Update interventions per flow sheet       []  Discharge due to:_  []  Other:_      Doreen Yoon PT 5/25/2022  1:25 PM    Future Appointments   Date Time Provider Marc Ruvalcaba   5/25/2022  1:30 PM Anju Villagran, PT [facility]   5/27/2022  2:15 PM Sean Hernández, PTA [facility]   6/1/2022 11:15 AM Blaise Barney, PTA [facility]   6/3/2022 11:15 AM Blaise Barney, PTA [facility]   6/6/2022 11:15 AM Anju Villagran PT [facility]   6/7/2022  8:00 AM Marbin Decker MD [facility]   6/8/2022  2:15 PM Blaise Barney, LINDA [facility]   6/14/2022 10:30 AM Blaise Barney, PTA [facility]   6/17/2022 12:00 PM Anju Villagran, PT [facility]   6/20/2022 3:00 PM Velna Cooks, PTA [facility]   6/23/2022 10:30 AM Velna Cooks, PTA [facility]   6/27/2022 10:30 AM Velna Cooks, PTA [facility]   6/30/2022 10:30 AM Velna Cooks, PTA [facility]   8/30/2022 10:00 AM Allen Monteiro   9/6/2022 10:00 AM PRANAY Herrera

## 2022-05-25 NOTE — PROGRESS NOTES
[facility]  [place] [facility]  [address] [place] [address]  [phone] fax    Continued Plan of Care/ Re-certification for Physical Therapy Services    Patient name: Deon ROSAS [facility] AN [facility]FFILIATE OF [facility] of Care: 3/23/2022   Referral source: Checo Rosario NP RYE: 6/5/7474               Medical Diagnosis: Weakness of left lower extremity [R29.898]  Weakness of right lower extremity [R29.898]  Payor: VA MEDICARE / Plan: VA MEDICARE PART A & B / Product Type: Medicare /  Onset Date:2019               Treatment Diagnosis: LE Weakness   Prior Hospitalization: see medical history Provider#: [id number]   Medications: Verified on Patient summary List    Comorbidities: Stroke 2019 w/Right Hemiparesis, Arthritis, HBP.   Prior Level of Function: Ambulates with SPC. Lives with wife in a 1 story house. Takes daily walks in his yard and has a [employer] for inside workouts.     Vis[facility] from [facility]: 17    Missed Visits: 0    The Plan of Care and following information is based on the patient's current status:  Goal: Pt will increase FOTO score by 11 pts to improve function.    Status at last note/certification:FOTO=52  Current Status: Progressing . Increased by 7 pts since eval    Goal:Pt will ambulate on various surfaces including outdoor terrain w/o LOB to improve ambulation tolerance outdoors to be able to go on Vacation  Status at last note/certification: Ambulates outdoors for shopping with his wife. Current Status: Progressing     Goal:. Pt will improve right Hip flexor strength to 5/5 to ease with stepping strategies and negotiating steps and curbs.   Status at last note/certification:Right hip flexion=4/5, left hip flexion=4/5            Current Status: not met. Hip flexor =4/5 right/ 4/5=left     Key functional changes: Improved Endurance and Balance. Problems/ barriers to goal attainment: Extensor Synergy Pattern limiting normal progression.      Problem List: pain affecting function, decrease ROM, decrease strength, impaired gait/ balance, decrease ADL/ functional abilitiies, decrease activity tolerance, decrease flexibility/ joint mobility and decrease transfer abilities    Treatment Plan: Therapeutic exercise, Therapeutic activities, Neuromuscular re-education, Physical agent/modality, Gait/balance training, Manual therapy, Patient education, Self Care training, Functional mobility training, Home safety training and Stair training     Patient Goal (s) has been updated and includes: To walk Better. Goals for this certification period to be accomplished in 4 weeks:  1. Pt will increase FOTO score by 11 pts to improve function.     2. Pt will ambulate on various surfaces including outdoor terrain with SPC,  w/o LOB to improve ambulation tolerance outdoors to be able to go on Vacation .     3. Pt will improve Bilateral Hip flexor strength to 5/5 to ease with stepping strategies and negotiating steps and curbs. 4. Pt will hold an abdominal Tuck/Hold ex for 15 sec x5 to improve with core strength for improved Ambulation endurance. 5. Pt will perform Modified Tandem stance EO x 30 sec to ease with progressive balance strategies during outdoor Ambulation. Frequency / Duration: Patient to be seen 2-3 times per week for 4 weeks:    Assessment / Recommendations:Pt progressing towards goals with improving balance and endurance. He periodically walks around his home w/o the [facility]. Pt is working on  updated exercises to address core and ankle strength/stability. He demonstrates improving right quad control to reduce genu recurvatum for standing static exercises but is unable to carryover to dynamic activities due to compensatory strategy with extensor synergy. Will continue to progress hip strength especially focus on glutes to improve balance on various surfaces to decrease fall risk.      Certification Period: 5/25/22-6/23/22    Sher Rodgers, PT 5/25/2022 1:23 PM    ________________________________________________________________________  I certify that the above Therapy Services are being furnished while the patient is under my care. I agree with the treatment plan and certify that this therapy is necessary. [] I have read the above and request that my patient continue as recommended.   [] I have read the above report and request that my patient continue therapy with the following changes/special instructions: _______________________________________  [] I have read the above report and request that my patient be discharged from therapy    Physician's Signature:____________Date:_________TIME:________     Americo Saucedo NP  ** Signature, Date and Time must be completed for valid certification **    Please sign and return to [facility] [address]  [phone] fax

## 2022-05-27 ENCOUNTER — HOSPITAL ENCOUNTER (OUTPATIENT)
Dept: PHYSICAL THERAPY | Age: 67
Discharge: HOME | End: 2022-05-27
Payer: MEDICARE

## 2022-05-27 PROCEDURE — 97112 NEUROMUSCULAR REEDUCATION: CPT

## 2022-05-27 PROCEDURE — 97110 THERAPEUTIC EXERCISES: CPT

## 2022-05-27 PROCEDURE — 97530 THERAPEUTIC ACTIVITIES: CPT

## 2022-05-27 NOTE — PROGRESS NOTES
PT DAILY TREATMENT NOTE     Patient Name: Masoud Winters  Date:2022  : 1955  [x]  Patient  Verified  Payor: VA MEDICARE / Plan: VA MEDICARE PART A & B / Product Type: Medicare /    In time: 1:56  Out time: 2:34  Total Treatment Time (min): 38  Visit #: 1 of     Medicare/BCBS Only   Total Timed Codes (min):  38 1:1 Treatment Time:  38       Treatment Area: Weakness of left lower extremity [R29.898]  Weakness of right lower extremity [R29.898]    SUBJECTIVE  Pain Level (0-10 scale): 0/10  Any medication changes, allergies to medications, adverse drug reactions, diagnosis change, or new procedure performed?: [x] No    [] Yes (see summary sheet for update)  Subjective functional status/changes:   [x] No changes reported    OBJECTIVE    10 min Therapeutic Exercise:  [] See flow sheet :   Rationale: increase ROM, increase strength and improve coordination to improve the patients ability to ease with adl's    10 min Therapeutic Activity:  []  See flow sheet :   Rationale: increase ROM, increase strength and improve coordination  to improve the patients ability to ease with adl's     18 min Neuromuscular Re-education:  []  See flow sheet :   Rationale: increase strength, improve coordination, improve balance and increase proprioception  to improve the patients ability to ease with adl's     With   [] TE   [] TA   [] neuro   [] other: Patient Education: [x] Review HEP    [] Progressed/Changed HEP based on:   [] positioning   [] body mechanics   [] transfers   [] heat/ice application    [] other:      Other Objective/Functional Measures:   Intermittent touch for MSR EC  Manual assistance to hold foot with SL Press  Manual resistance for SL donkey kicks  Manual assistance to hold LE with ankle x 4   Challenged with hollow tuck  Challenged with 90/90 oblique, manual assistance with right UE    Pain Level (0-10 scale) post treatment: 0/10    ASSESSMENT/Changes in Function:   Patient tolerated session well with moderate fatigue and no pain. He remains challenged with static balance especially with EC. HE continues to require manual assistance with foot placement on leg press and with holding hisLE during ankle x 4 to prevent compensations. Patient will continue to benefit from skilled PT services to modify and progress therapeutic interventions, address functional mobility deficits, address ROM deficits, address strength deficits, analyze and address soft tissue restrictions, analyze and cue movement patterns, analyze and modify body mechanics/ergonomics, assess and modify postural abnormalities and address imbalance/dizziness to attain remaining goals. []  See Plan of Care  []  See progress note/recertification  []  See Discharge Summary         Goals for this certification period to be accomplished in 4 weeks:  1. Pt will increase FOTO score by 11 pts to improve function.     2. Pt will ambulate on various surfaces including outdoor terrain with SPC,  w/o LOB to improve ambulation tolerance outdoors to be able to go on Vacation .     3. Pt will improve Bilateral Hip flexor strength to 5/5 to ease with stepping strategies and negotiating steps and curbs.      4. Pt will hold an abdominal Tuck/Hold ex for 15 sec x5 to improve with core strength for improved Ambulation endurance. Current: challenged with 10\" holds (5/27/22)     5. Pt will perform Modified Tandem stance EO x 30 sec to ease with progressive balance strategies during outdoor Ambulation.    Current: challenged with tandem EO and MSR EC (5/27/22)    PLAN  [x]  Upgrade activities as tolerated     [x]  Continue plan of care  []  Update interventions per flow sheet       []  Discharge due to:_  []  Other:_      Thom Haile PTA 5/27/2022  2:34 PM    Future Appointments   Date Time Provider Marc Ruvalcaba   5/27/2022  2:15 PM [facility] [address]   6/1/2022 11:15 AM [facility] [address]   6/3/2022 11:15 AM Saira Erazo, PTA [facility]   6/6/2022 11:15 AM Darcy Goodman, PT [facility]   6/7/2022  8:00 AM Grace Segundo MD [facility]   6/8/2022  2:15 PM Saira Erazo, PTA [facility]   6/14/2022 10:30 AM Saira Erazo, PTA [facility]   6/17/2022 12:00 PM Darcy Goodman, PT [facility]   6/20/2022  3:00 PM Saira Erazo, PTA [facility]   6/23/2022 10:30 AM Saira Erazo, PTA [facility]   6/27/2022 10:30 AM Saira Erazo, PTA [facility]   6/30/2022 10:30 AM Saira Erazo, PTA [facility]   8/30/2022 10:00 AM Allen Monteiro   9/6/2022 10:00 AM PRANAY Howard [address] [facility]

## 2022-06-01 ENCOUNTER — HOSPITAL ENCOUNTER (OUTPATIENT)
Dept: PHYSICAL THERAPY | Age: 67
Discharge: HOME | End: 2022-06-01
Payer: MEDICARE

## 2022-06-01 PROCEDURE — 97110 THERAPEUTIC EXERCISES: CPT

## 2022-06-01 PROCEDURE — 97112 NEUROMUSCULAR REEDUCATION: CPT

## 2022-06-01 NOTE — PROGRESS NOTES
PT DAILY TREATMENT NOTE     Patient Name: Brittanie Alves  Date:2022  : 1955  [x]  Patient  Verified  Payor: Susi Douglas / Plan: [place] MED[place] & B / Product Type: Medicare /    In time: 11:15  Out time: 12:00  Total Treatment Time (min): 45  Visit #: 2 of     Medicare/BCBS Only   Total Timed Codes (min):  45 1:1 Treatment Time:  45       Treatment Area: Weakness of left lower extremity [R29.898]  Weakness of right lower extremity [R29.898]    SUBJECTIVE  Pain Level (0-10 scale): 0/10  Any medication changes, allergies to medications, adverse drug reactions, diagnosis change, or new procedure performed?: [x] No    [] Yes (see summary sheet for update)  Subjective functional status/changes:   [x] No changes reported  Pt reports no current pain. He states the balance with his foot in front of the other is the hardest part. He feels strength is improving.     OBJECTIVE    30 min Therapeutic Exercise:  [x] See flow sheet :   Rationale: increase ROM, increase strength and improve coordination to improve the patients ability to ease with adls     15 min Neuromuscular Re-education:  [x]  See flow sheet : balance interventions   Rationale: increase strength, improve coordination, improve balance and increase proprioception  to improve the patients ability to ease with adls     With   [] TE   [] TA   [] neuro   [] other: Patient Education: [x] Review HEP    [] Progressed/Changed HEP based on:   [] positioning   [] body mechanics   [] transfers   [] heat/ice application    [] other:      Other Objective/Functional Measures:   Improved hip IR activation and hip abduction activation when performed immediately post stretching hip ER and hip flexors  Able to perform knee flexion with right hip extended just challenged keeping hip in neutral to reduce ER  Ambulates with hip hike and circumduction due to extensor synergy  Improved glute activation with side lying donkey kicks  Challenged with right PF in standing    Pain Level (0-10 scale) post treatment: 0/10    ASSESSMENT/Changes in Function: Pt progressing with muscle strengthening but is dominated by extensor synergy with habitual circumduction of right LE during gait and genu recurvatum at midstance. He is able to \"soften\" his knee for static balance interventions but struggles to carryover during gait. He has improved hamstring activation and glute activation with exercises. He demonstrates hip abduction and hip IR weakness. Will continue to progress with remaining strength deficits and progress balance as able with eventual transition to home program upon D/C. Patient will continue to benefit from skilled PT services to modify and progress therapeutic interventions, address functional mobility deficits, address ROM deficits, address strength deficits, analyze and address soft tissue restrictions, analyze and cue movement patterns, analyze and modify body mechanics/ergonomics, assess and modify postural abnormalities and address imbalance/dizziness to attain remaining goals. [x]  See Plan of Care  [x]  See progress note/recertification  []  See Discharge Summary         Goals for this certification period to be accomplished in 4 weeks:  1. Pt will increase FOTO score by 11 pts to improve function.   2. Pt will ambulate on various surfaces including outdoor terrain with SPC,  w/o LOB to improve ambulation tolerance outdoors to be able to go on Vacation .   3. Pt will improve Bilateral Hip flexor strength to 5/5 to ease with stepping strategies and negotiating steps and curbs.    4. Pt will hold an abdominal Tuck/Hold ex for 15 sec x5 to improve with core strength for improved Ambulation endurance. Current: challenged with 10\" holds (5/27/22)   5. Pt will perform Modified Tandem stance EO x 30 sec to ease with progressive balance strategies during outdoor Ambulation.    Current: challenged with tandem EO and MSR EC (5/27/22)    PLAN  [x]  Upgrade activities as tolerated     [x]  Continue plan of care  []  Update interventions per flow sheet       []  Discharge due to:_  []  Other:_      Cammie Waggoner, LINDA 6/1/2022  2:34 PM    Future Appointments   Date Time Provider Marc Peg   6/1/2022 11:15 AM Jacky Cranker, PTA [facility]   6/3/2022 11:15 AM Jacky Cranker, PTA [facility]   6/6/2022 11:15 AM Dimitris Damon, PT [email or web address]   6/7/2022  8:00 AM Jose F Oleary MD [facility]   6/8/2022  2:15 PM Jacky Cranker, PTA [facility]   6/14/2022 10:30 AM Jacky Cranker, PTA [facility]   6/17/2022 12:00 PM Dimitris Damon, PT [facility]   6/20/2022  3:00 PM Jacky Cranker, PTA [facility]   6/23/2022 10:30 AM Jacky Cranker, PTA [facility]   6/27/2022 10:30 AM Jacky Cranker, PTA [facility]   6/30/2022 10:30 AM Jacky Cranker, PTA [facility]   8/30/2022 10:00 AM Allen Monteiro   9/6/2022 10:00 AM PRANAY Camargo [address] [facility]

## 2022-06-03 ENCOUNTER — HOSPITAL ENCOUNTER (OUTPATIENT)
Dept: PHYSICAL THERAPY | Age: 67
Discharge: HOME | End: 2022-06-03
Payer: MEDICARE

## 2022-06-03 PROCEDURE — 97112 NEUROMUSCULAR REEDUCATION: CPT

## 2022-06-03 PROCEDURE — 97110 THERAPEUTIC EXERCISES: CPT

## 2022-06-03 NOTE — PROGRESS NOTES
PT DAILY TREATMENT NOTE     Patient Name: Collier Primrose  Date:6/3/2022  : 1955  [x]  Patient  Verified  Payor: Darleen Craig / Plan: [place] MEDICARE PART A & B / Product Type: Medicare /    In time: 11:15   Out time: 12:00  Total Treatment Time (min): 45  Visit #: 3 of     Medicare/BCBS Only   Total Timed Codes (min):  45 1:1 Treatment Time: 45       Treatment Area: Weakness of left lower extremity [R29.898]  Weakness of right lower extremity [R29.898]    SUBJECTIVE  Pain Level (0-10 scale): 0/10  Any medication changes, allergies to medications, adverse drug reactions, diagnosis change, or new procedure performed?: [x] No    [] Yes (see summary sheet for update)  Subjective functional status/changes:   [x] No changes reported  Pt reports no current pain. He states he has been working on his exercises at home. He is up to 45 minutes of walking.     OBJECTIVE    30 min Therapeutic Exercise:  [x] See flow sheet :   Rationale: increase ROM, increase strength and improve coordination to improve the patients ability to ease with adls     10 min Neuromuscular Re-education:  [x]  See flow sheet : balance interventions   Rationale: increase strength, improve coordination, improve balance and increase proprioception  to improve the patients ability to ease with adls     With   [] TE   [] TA   [] neuro   [] other: Patient Education: [x] Review HEP    [] Progressed/Changed HEP based on:   [] positioning   [] body mechanics   [] transfers   [] heat/ice application    [] other:      Other Objective/Functional Measures:  Right hip flexor tightness  Decreased right hip IR activation; assistance to perform seated hip IR with BBK  Improved glute activation for side lying donkey kicks and hip abduction both still with assistance but improved ROM post 90\" stretching of hip flexors  Provided GTB for ankle x 4  Unable to perform SLS without 1 UE assist  Able to perform MSR without LOB  Challenged with tandem with verbal cues to maintain upright posture to perform  Challenged with EC foam activities  Cues for standing exercises to work on TKE stability without over flexing the knee to compensate for end range weakness    Pain Level (0-10 scale) post treatment: 0/10    ASSESSMENT/Changes in Function: Pt progressing in therapy with ability to perform MSR without LOB. He is progressing to tandem balance with also working on ankle strengthening/stability. He demonstrates decreased balance on compliance surface with vision removed. He has increased hip flexor and piriformis tightness with most weakness in his hip abductors and internal rotators on the right. He does have improvement in glute and hamstring strength and have noticed increase in knee flexion during gait. He is dominated by his compensatory gait pattern of hip hike and circumduction for swing phase causing an increase in extensor synergy. Patient will continue to benefit from skilled PT services to modify and progress therapeutic interventions, address functional mobility deficits, address ROM deficits, address strength deficits, analyze and address soft tissue restrictions, analyze and cue movement patterns, analyze and modify body mechanics/ergonomics, assess and modify postural abnormalities and address imbalance/dizziness to attain remaining goals. [x]  See Plan of Care  [x]  See progress note/recertification  []  See Discharge Summary         Goals for this certification period to be accomplished in 4 weeks:  1. Pt will increase FOTO score by 11 pts to improve function.   2. Pt will ambulate on various surfaces including outdoor terrain with SPC,  w/o LOB to improve ambulation tolerance outdoors to be able to go on Vacation .   3. Pt will improve Bilateral Hip flexor strength to 5/5 to ease with stepping strategies and negotiating steps and curbs.    4.  Pt will hold an abdominal Tuck/Hold ex for 15 sec x5 to improve with core strength for improved Ambulation endurance. Current: challenged with 10\" holds (5/27/22)   5. Pt will perform Modified Tandem stance EO x 30 sec to ease with progressive balance strategies during outdoor Ambulation.    Current: challenged with tandem EO and MSR EC (5/27/22) MET (6/3/22)    PLAN  [x]  Upgrade activities as tolerated     [x]  Continue plan of care  []  Update interventions per flow sheet       []  Discharge due to:_  []  Other:_      Sophia Griffin PTA 6/3/2022  2:34 PM    Future Appointments   Date Time Provider Marc Ruvalcaba   6/3/2022 11:15 AM Juan Fuentes, PTA [facility]   6/6/2022 11:15 AM Alonzo Knapp, PT [facility]   6/7/2022  8:00 AM Jimmie Landa MD [facility]   6/8/2022  2:15 PM Juan Fuentes, PTA [facility]   6/14/2022 10:30 AM Juan Fuentes, PTA [facility]   6/17/2022 12:00 PM Alonzo Knapp, PT [facility]   6/20/2022  3:00 PM Juan Fuentes, PTA [facility]   6/23/2022 10:30 AM Juan Fuentes, PTA [facility]   6/27/2022 10:30 AM Juan Fuentes, PTA [facility]   6/30/2022 10:30 AM Juan Fuentes, PTA [facility]   8/30/2022 10:00 AM Allen Cayetano   9/6/2022 10:00 AM PRANAY Briscoe

## 2022-06-06 ENCOUNTER — HOSPITAL ENCOUNTER (OUTPATIENT)
Dept: PHYSICAL THERAPY | Age: 67
Discharge: HOME | End: 2022-06-06
Payer: MEDICARE

## 2022-06-06 PROCEDURE — 97110 THERAPEUTIC EXERCISES: CPT

## 2022-06-06 NOTE — PROGRESS NOTES
PT DAILY TREATMENT NOTE     Patient Name: Ray Santana  Date:2022  : 1955  [x]  Patient  Verified  Payor: [place] MEDICARE / Plan: [place] MEDICARE PART A & B / Product Type: Medicare /    In time:1120  Out time:1200  Total Treatment Time (min): 40  Visit #: 4 of     Medicare/BCBS Only   Total Timed Codes (min):  40 1:1 Treatment Time:  40       Treatment Area: Weakness of left lower extremity [R29.898]  Weakness of right lower extremity [R29.898]    SUBJECTIVE  Pain Level (0-10 scale): 0/10  Any medication changes, allergies to medications, adverse drug reactions, diagnosis change, or new procedure performed?: [x] No    [] Yes (see summary sheet for update)  Subjective functional status/changes:   [] No changes reported  Pt reports he walked around a [place] ground this weekend. He would walk 20-30 mins and rest, then walk again another 20-30 mins. OBJECTIVE        40 min Therapeutic Exercise:  [] See flow sheet :   Rationale: increase ROM, increase strength, improve coordination and improve balance to improve the patients ability to ease with ambulation tolerance            With   [] TE   [] TA   [] neuro   [] other: Patient Education: [x] Review HEP    [] Progressed/Changed HEP based on:   [] positioning   [] body mechanics   [] transfers   [] heat/ice application    [] other:      Other Objective/Functional Measures: Strong flexor synergy pattern during SL hip Abd. Pt requires max tactile cueing to decrease hip from posterior rotation. Mod Tandem w/o LOB. Assist to decrease LE compensation during ankle ex's  Pain Level (0-10 scale) post treatment: 0/10    ASSESSMENT/Changes in Function: Progressing well.      Patient will continue to benefit from skilled PT services to modify and progress therapeutic interventions, address functional mobility deficits, address ROM deficits, address strength deficits, analyze and address soft tissue restrictions, analyze and cue movement patterns, analyze and modify body mechanics/ergonomics, assess and modify postural abnormalities and address imbalance/dizziness to attain remaining goals. []  See Plan of Care  []  See progress note/recertification  []  See Discharge Summary         Progress towards goals / Updated goals:  in 4 weeks:  1. Pt will increase FOTO score by 11 pts to improve function.   2. Pt will ambulate on various surfaces including outdoor terrain with SPC,  w/o LOB to improve ambulation tolerance outdoors to be able to go on Vacation .   3. Pt will improve Bilateral Hip flexor strength to 5/5 to ease with stepping strategies and negotiating steps and curbs.    4. Pt will hold an abdominal Tuck/Hold ex for 15 sec x5 to improve with core strength for improved Ambulation endurance.    Current: challenged with 10\" holds (5/27/22)   5. Pt will perform Modified Tandem stance EO x 30 sec to ease with progressive balance strategies during outdoor Ambulation.   Current: challenged with tandem EO and MSR EC (5/27/22) MET (6/3/22)    PLAN  [x]  Upgrade activities as tolerated     [x]  Continue plan of care  []  Update interventions per flow sheet       []  Discharge due to:_  []  Other:_      Illa Call, PT 6/6/2022  1:27 PM    Future Appointments   Date Time Provider Marc Ruvalcaba   6/7/2022  8:00 AM Lashae Monteiro MD [facility]   6/8/2022  2:15 PM Para Proffer, PTA [facility]   6/14/2022 10:30 AM Para Proffer, PTA [facility]   6/17/2022 12:00 PM Eric Crockett, PT [facility]   6/20/2022  3:00 PM Para Proffer, PTA [facility]   6/23/2022 10:30 AM Para Proffer, PTA [facility]   6/27/2022 10:30 AM Para Proffer, PTA [facility]   6/30/2022 10:30 AM Para Proffer, PTA [facility]   8/30/2022 10:00 AM [place]   9/6/2022 10:00 AM Carla Reveles, [address] [facility]

## 2022-06-07 ENCOUNTER — OFFICE VISIT (OUTPATIENT)
Dept: NEUROLOGY | Age: 67
End: 2022-06-07
Payer: MEDICARE

## 2022-06-07 VITALS
SYSTOLIC BLOOD PRESSURE: 118 MMHG | WEIGHT: 221 LBS | OXYGEN SATURATION: 98 % | HEART RATE: 68 BPM | RESPIRATION RATE: 18 BRPM | BODY MASS INDEX: 32.73 KG/M2 | HEIGHT: 69 IN | DIASTOLIC BLOOD PRESSURE: 60 MMHG

## 2022-06-07 DIAGNOSIS — G81.91 RIGHT HEMIPARESIS (HCC): Primary | ICD-10-CM

## 2022-06-07 DIAGNOSIS — I63.9 INFARCTION OF LEFT BASAL GANGLIA (HCC): ICD-10-CM

## 2022-06-07 PROCEDURE — G8536 NO DOC ELDER MAL SCRN: HCPCS | Performed by: STUDENT IN AN ORGANIZED HEALTH CARE EDUCATION/TRAINING PROGRAM

## 2022-06-07 PROCEDURE — 3017F COLORECTAL CA SCREEN DOC REV: CPT | Performed by: STUDENT IN AN ORGANIZED HEALTH CARE EDUCATION/TRAINING PROGRAM

## 2022-06-07 PROCEDURE — 1101F PT FALLS ASSESS-DOCD LE1/YR: CPT | Performed by: STUDENT IN AN ORGANIZED HEALTH CARE EDUCATION/TRAINING PROGRAM

## 2022-06-07 PROCEDURE — G8432 DEP SCR NOT DOC, RNG: HCPCS | Performed by: STUDENT IN AN ORGANIZED HEALTH CARE EDUCATION/TRAINING PROGRAM

## 2022-06-07 PROCEDURE — G0463 HOSPITAL OUTPT CLINIC VISIT: HCPCS | Performed by: STUDENT IN AN ORGANIZED HEALTH CARE EDUCATION/TRAINING PROGRAM

## 2022-06-07 PROCEDURE — G8417 CALC BMI ABV UP PARAM F/U: HCPCS | Performed by: STUDENT IN AN ORGANIZED HEALTH CARE EDUCATION/TRAINING PROGRAM

## 2022-06-07 PROCEDURE — 99214 OFFICE O/P EST MOD 30 MIN: CPT | Performed by: STUDENT IN AN ORGANIZED HEALTH CARE EDUCATION/TRAINING PROGRAM

## 2022-06-07 PROCEDURE — G8427 DOCREV CUR MEDS BY ELIG CLIN: HCPCS | Performed by: STUDENT IN AN ORGANIZED HEALTH CARE EDUCATION/TRAINING PROGRAM

## 2022-06-07 PROCEDURE — 1123F ACP DISCUSS/DSCN MKR DOCD: CPT | Performed by: STUDENT IN AN ORGANIZED HEALTH CARE EDUCATION/TRAINING PROGRAM

## 2022-06-07 NOTE — PROGRESS NOTES
Lolis Robertson presents today for   Chief Complaint   Patient presents with    Stroke     follow up       Is someone accompanying this pt? no    Is the patient using any DME equipment during [address]? no    Depression Screening:  3 most recent PHQ Screens 3/18/2020   Little interest or pleasure in doing things Not at all   Feeling down, depressed, irritable, or hopeless Not at all   Total Score PHQ 2 0       Learning Assessment:  Learning Assessment 3/18/2020   PRIMARY LEARNER Patient   HIGHEST LEVEL OF EDUCATION - PRIMARY LEARNER  GRADUATED HIGH SCHOOL OR GED   BARRIERS PRIMARY LEARNER NONE   CO-LEARNER CAREGIVER No   PRIMARY LANGUAGE ENGLISH   LEARNER PREFERENCE PRIMARY READING     LISTENING   ANSWERED BY Patient   RELATIONSHIP SELF       Abuse Screening:  Abuse Screening Questionnaire 3/18/2020   Do you ever feel afraid of your partner? N   Are you in a relationship with someone who physically or mentally threatens you? N   Is it safe for you to go home? Y       Fall Risk  Fall Risk Assessment, last 12 mths 6/7/2022   Able to walk? Yes   Fall in past 12 months? 0   Do you feel unsteady? 1   Are you worried about falling 0   Is TUG test greater than 12 seconds? 1   Is the gait abnormal? 1   Number of falls in past 12 months 0   Fall with injury? -         Coordination of Care:  1. Have you been to the ER, urgent care clinic since your last visit? Hospitalized since your last visit? no    2. Have you seen or consulted any other health care providers outside of the [address] [place] [address] since your last visit? Include any pap smears or colon screening.  no

## 2022-06-07 NOTE — PROGRESS NOTES
Sonya Grossman is a 75 y.o. male . presents for Stroke (follow up)   . A 75years old male patient with left posterior basal ganglia and corona radiata stroke in August 2019 with residual  right-sided hemiparesis here for follow-up. Last seen in the clinic inNov 2021. Continues to have the right-sided weakness. The weakness is about the same but he claims that he is moving his fingers little better and better movement of the shoulder. He is able to walk with a cane but at home he can walk without any support. He drags the right leg. No falls recently. Denied any pain. No problem with his speech or swallowing. No problem with his vision. He is currently driving and do not have any problems. Follow-up  Pertinent negatives include no chest pain, no headaches and no shortness of breath. Stroke  Pertinent negatives include no chest pain, no headaches and no shortness of breath. Review of Systems   Constitutional: Negative for chills and fever. HENT: Negative for hearing loss and tinnitus. Eyes: Negative for blurred vision and double vision. Respiratory: Negative for cough and shortness of breath. Cardiovascular: Negative for chest pain and leg swelling. Gastrointestinal: Negative for nausea and vomiting. Genitourinary: Negative for dysuria, frequency and urgency. Musculoskeletal: Negative for back pain and neck pain. Skin: Negative for itching and rash. Neurological: Positive for focal weakness (righgt hemiparesis). Negative for dizziness, tingling, tremors, speech change (mild slurring) and headaches. Endo/Heme/Allergies: Bruises/bleeds easily. Psychiatric/Behavioral: Negative for depression. The patient is not nervous/anxious.         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     On Allopurinol    Chronic hypokalemia     Persistent chronic hypokalemia + hypertension; ?primary hyperaldosteronism    CKD (chronic kidney disease) stage 2, GFR 60-89 ml/min 8/15/2019    Current use of aspirin 8/14/2019    Dysarthria 8/12/2019    Dysphagia 8/12/2019    Elevated prostate specific antigen (PSA) 7/21/2011    First degree atrioventricular block by electrocardiogram 8/12/2019    Hypertensive heart and kidney disease without heart failure and with stage 2 chronic kidney disease     Iron deficiency anemia 8/14/2019    Anemia work-up (8/14/2019) showed serum iron 22, TIBC 371, iron % saturation 6, ferritin 34, reticulocyte count 1.4     Obesity, Class I, BMI 30-34.9     Obstructive sleep apnea on CPAP     On statin therapy due to risk of future cardiovascular event 8/14/2019    On Atorvastatin    Primary osteoarthritis of right ankle 8/22/2019    X-ray of the right ankle (8/22/2019) showed no acute fracture or subluxation; advanced degenerative changes at the tibiotalar joint; old fracture fragment vs. accessory ossicle in the inferior aspect of the lateral malleolus; soft tissue swelling around the ankle.  Pure hypercholesterolemia 08/15/2019    Lipid profile (8/13/2019) showed , , HDL 42, ; Lipid profile (8/14/2019) showed , , HDL 39, LDL 94    Received intravenous tissue plasminogen activator (tPA) in emergency department 8/12/2019    Refusal of statin medication by patient 8/13/2019    As per note of Neurology (Dr. Ariadne Byrd), patient refuses statin agent because of potential side effects.      Right hemiparesis ([facility] [address] 8/12/2019    Secondary hyperparathyroidism of renal origin ([facility] [address] 8/18/2019    PTH (8/18/2019) = 101.7    Type 2 diabetes mellitus with stage 2 chronic kidney disease (HCC)     HbA1c (8/13/2019) = 6.6 no meds    Vitamin B12 deficiency anemia 8/14/2019    Vitamin B12 (8/14/2019) = 208    Vitamin D deficiency 8/19/2019    Vitamin D 25-Hydroxy (8/19/2019) = 16.2        Past Surgical History:   Procedure Laterality Date    COLONOSCOPY N/A 4/15/2019    COLONOSCOPY performed by Raiza Pacheco MD at [facility] ENDOSCOPY    COLONOSCOPY N/A 12/20/2021    COLONOSCOPY with polypectomies performed by Raiza Pacheco MD at [facility] ENDOSCOPY    HX TONSILLECTOMY          Family History   Problem Relation Age of Onset    Cancer Mother         Gastric cancer    Heart Disease Father     Prostate Cancer Father         Social History     Socioeconomic History    Marital status:      Spouse name: Not on file    Number of children: Not on file    Years of education: Not on file    Highest education level: Not on file   Occupational History    Not on file   Tobacco Use    Smoking status: Former Smoker    Smokeless tobacco: Never Used   Substance and Sexual Activity    Alcohol use: No     Alcohol/week: 0.0 standard drinks    Drug use: No    Sexual activity: Not on file   Other Topics Concern    Not on file   Social History Narrative    Not on file     Social Determinants of Health     Financial Resource Strain:     Difficulty of Paying Living Expenses: Not on file   Food Insecurity:     Worried About [address] in the Last Year: Not on file    [address] in the Last Year: Not on file   Transportation Needs:     Lack of Transportation (Medical): Not on file    Lack of Transportation (Non-Medical):  Not on file   Physical Activity:     Days of Exercise per Week: Not on file    Minutes of Exercise per Session: Not on file   Stress:     Feeling of Stress : Not on file   Social Connections:     Frequency of Communication with Friends and Family: Not on file    Frequency of Social Gatherings with Friends and Family: Not on file    Attends Scientologist Services: Not on file    Active Member of Clubs or Organizations: Not on file    Attends Club or Organization Meetings: Not on file    Marital Status: Not on file   Intimate Partner Violence:     Fear of Current or Ex-Partner: Not on file    Emotionally Abused: Not on file    Physically Abused: Not on file    Sexually Abused: Not on file   Housing Stability:     Unable to Pay for Housing in the Last Year: Not on file    Number of Isidro in the Last Year: Not on file    Unstable Housing in the Last Year: Not on file        No Known Allergies      Current Outpatient Medications   Medication Sig Dispense Refill    finasteride (PROSCAR) 5 mg tablet Take 1 Tablet by mouth daily. Indications: enlarged prostate with urination problem 90 Tablet 4    MULTIVITAMIN PO Take 1 Tablet by mouth daily.  tiZANidine (ZANAFLEX) 4 mg tablet TAKE 1 TABLET BY MOUTH EVERYDAY AT BEDTIME      spironolactone (ALDACTONE) 25 mg tablet TAKE 1 TABLET BY MOUTH TWICE A DAY      Klor-Con M20 20 mEq tablet TAKE 1 TABLET BY MOUTH ONCE A DAY. DO NOT CRUSH OR CHEW.  olmesartan (BENICAR) 40 mg tablet Take 40 mg by mouth daily.  glucose blood VI test strips (ASCENSIA AUTODISC VI, ONE TOUCH ULTRA TEST VI) strip Use one strip with glucometer daily in AM before breakfast and PRN (E11.21)      hydroCHLOROthiazide (HYDRODIURIL) 25 mg tablet Take 25 mg by mouth.  lancets misc Prick finger with lancet once a day in AM before breakfast or PRN to monitor blood sugar (E11.21)      sertraline (ZOLOFT) 50 mg tablet       carvediloL (COREG) 12.5 mg tablet Take 12.5 mg by mouth.  ferrous sulfate 325 mg (65 mg iron) tablet Take 1 Tab by mouth daily (with breakfast). 30 Tab 0    hydrALAZINE (APRESOLINE) 25 mg tablet Take 3 Tabs by mouth every eight (8) hours. Indications: high blood pressure (Patient taking differently: Take 50 mg by mouth every eight (8) hours. Takes 2 tabs TID  Indications: high blood pressure) 270 Tab 0    aspirin 81 mg chewable tablet Take 1 Tab by mouth daily (with breakfast).  Indications: stroke prevention 30 Tab 0    amLODIPine (NORVASC) 10 mg tablet Take 10 mg by mouth daily. Physical Exam  Constitutional:       Appearance: Normal appearance. HENT:      Head: Atraumatic. Mouth/Throat:      Mouth: Mucous membranes are moist.      Pharynx: Oropharynx is clear. No oropharyngeal exudate. Eyes:      Extraocular Movements: Extraocular movements intact. Pupils: Pupils are equal, round, and reactive to light. Pulmonary:      Effort: Pulmonary effort is normal. No respiratory distress. Musculoskeletal:      Cervical back: Normal range of motion and neck supple. Comments: Right hemiparesis (upper extremity more than the lower extremity)   Neurological:      Mental Status: He is alert. Comments: Mental status: Awake, alert, oriented, follows simple and complex commands, no neglect, no extinction to DSS or VSS. Speech and languge: fluent with no significant dysarthria, coherent,  and comprehension intact  CN: VFF, EOMI, PERRLA, face sensation intact , mild right lower facial asymmetry noted, palate elevation symmetric bilat, difficulty lifting the right shoulder up;  tongue midline  Motor: increased tone over the right upper and lower extremities; strength over the right hand is 2-3/5; elbow to 3/5 and shoulder abduction is 3 out of 5; right lower extremity hip flexion 4+/5; knee extension 4+/5; knee flexion 4 /5; ankle dorsiflexion and plantar flexion 3/5. Strength is over the left side is normal.    Coordination: FNF, HS accurate w/o dysmetria on the left side; unable to test on the left. DTR: 2+ on the  left side and 3+ on the right side. Gait: Hemiparetic. No visits with results within 3 Month(s) from this visit.    Latest known visit with results is:   Admission on 12/20/2021, Discharged on 12/20/2021   Component Date Value Ref Range Status    Glucose (POC) 12/20/2021 109  70 - 110 mg/dL Final    Comment: (NOTE)  The FDA has indicated that no capillary point of care blood glucose   monitoring systems are approved for use in \"critically ill\" patients,   however they have not defined this population. The College of   American Pathologists has recommended that these devices should not   be used in cases such as severe hypotension, dehydration, shock, and   hyperglycemic-hyperosmolar state, amongst others. Venous or arterial   collection is the recommended specimen for testing these patients.  Glucose (POC) 12/20/2021 96  70 - 110 mg/dL Final    Comment: (NOTE)  The FDA has indicated that no capillary point of care blood glucose   monitoring systems are approved for use in \"critically ill\" patients,   however they have not defined this population. The College of   American Pathologists has recommended that these devices should not   be used in cases such as severe hypotension, dehydration, shock, and   hyperglycemic-hyperosmolar state, amongst others. Venous or arterial   collection is the recommended specimen for testing these patients. ICD-10-CM ICD-9-CM    1. Right hemiparesis (HCC)  G81.91 342.90    2. Infarction of left basal ganglia [facility])  I63.9 434.91      A 77-year-old male patient with history of left basal ganglia lacunar stroke in August 2019 here for follow-up evaluation. Continues to have the right hemiparesis. But independent in most ADL. Driving with no difficulty. DMV has released him and he does not need any more medical eval for DMV forms. We will continue with aspirin. His previous LDL level was low [25] and does not need the atorvastatin. We will follow closely with his primary care provider. We will see him again once in 6 months time. We will go to ER if he has any new strokelike symptoms.

## 2022-06-08 ENCOUNTER — HOSPITAL ENCOUNTER (OUTPATIENT)
Dept: PHYSICAL THERAPY | Age: 67
Discharge: HOME | End: 2022-06-08
Payer: MEDICARE

## 2022-06-08 PROCEDURE — 97110 THERAPEUTIC EXERCISES: CPT

## 2022-06-08 NOTE — PROGRESS NOTES
PT DAILY TREATMENT NOTE     Patient Name: Lolis Robertson  Date:2022  : 1955  [x]  Patient  Verified  Payor: Sara Smith / Plan: [place] & B / Product Type: Medicare /    In time: 2:18 Out time: 3:00  Total Treatment Time (min): 42   Visit #: 5 of     Medicare/BCBS Only   Total Timed Codes (min):  42 1:1 Treatment Time:  42       Treatment Area: Weakness of left lower extremity [R29.898]  Weakness of right lower extremity [R29.898]    SUBJECTIVE  Pain Level (0-10 scale): 0/10  Any medication changes, allergies to medications, adverse drug reactions, diagnosis change, or new procedure performed?: [x] No    [] Yes (see summary sheet for update)  Subjective functional status/changes:   [] No changes reported  Pt reports no current pain. Has a small bruise on the left side of his knee from bumping it. He reports improving strength/balance. He hasn't tried many balance exercises at home with his eyes closed.      OBJECTIVE    42 min Therapeutic Exercise:  [x] See flow sheet :   Rationale: increase ROM, increase strength, improve coordination and improve balance to improve the patients ability to ease with ambulation tolerance            With   [] TE   [] TA   [] neuro   [] other: Patient Education: [x] Review HEP    [] Progressed/Changed HEP based on:   [] positioning   [] body mechanics   [] transfers   [] heat/ice application    [] other:      Other Objective/Functional Measures:   No enough strength to perform right ankle inversion/eversion correctly with TB so switched to isometric into ball  Improving glute activation for hip abduction when cued to perform hip extension first and then abduction but fatigues quickly  No LOB with tandem today  Challenged with end range TKE stability/endurance during SLS with left UE assist  Added endurance hold at the end for SAQ and bridge to hold as long as possible  Fatigue of glutes caused HS cramp attempting SL bridge so switched to Double leg for Patient ID: Ntae Bah     Chief Complaint:   Chief Complaint   Patient presents with    Diabetes     3mth fu         HPI: Wellness exam. Doing well. May get Right ankle hardware removed by Dr. Keita in the near future. I reviewed MRI and arterial Ultrasounds. Needs updated labs. Complains of many months of epistaxis when eating hot temp foods- will refer to ENT. Had rectal cyst removed.     Review of Systems   Constitutional: Negative.    HENT: Negative.    Eyes: Negative.    Respiratory: Negative.    Cardiovascular: Negative.    Gastrointestinal: Positive for constipation.   Endocrine: Negative.    Genitourinary: Negative.    Musculoskeletal: Negative.    Skin: Negative.    Allergic/Immunologic: Negative.    Neurological: Negative.    Hematological: Negative.    Psychiatric/Behavioral: Negative.           Objective:      Physical Exam   Physical Exam  Vitals signs and nursing note reviewed.   Constitutional:       Appearance: He is well-developed.   HENT:      Head: Normocephalic and atraumatic.      Nose: Nose normal.   Eyes:      Conjunctiva/sclera: Conjunctivae normal.      Pupils: Pupils are equal, round, and reactive to light.   Neck:      Musculoskeletal: Normal range of motion and neck supple.   Cardiovascular:      Rate and Rhythm: Normal rate and regular rhythm.      Heart sounds: Normal heart sounds.   Pulmonary:      Effort: Pulmonary effort is normal.      Breath sounds: Normal breath sounds.   Abdominal:      General: Bowel sounds are normal.      Palpations: Abdomen is soft.   Musculoskeletal:      Comments: Ambulates with cane     Skin:     General: Skin is warm and dry.      Capillary Refill: Capillary refill takes less than 2 seconds.   Neurological:      Mental Status: He is alert and oriented to person, place, and time.   Psychiatric:         Behavior: Behavior normal.         Thought Content: Thought content normal.         Judgment: Judgment normal.       Current Outpatient  Medications:     albuterol (PROVENTIL HFA) 90 mcg/actuation inhaler, Inhale 2 puffs into the lungs every 6 (six) hours as needed for Wheezing. Rescue, Disp: 1 Inhaler, Rfl: 3    allopurinoL (ZYLOPRIM) 300 MG tablet, TAKE 1 TABLET  BEFORE  BREAKFAST, Disp: 90 tablet, Rfl: 3    atorvastatin (LIPITOR) 80 MG tablet, Take 1 tablet (80 mg total) by mouth every other day., Disp: , Rfl:     clopidogreL (PLAVIX) 75 mg tablet, TAKE 1 TABLET EVERY DAY, Disp: 90 tablet, Rfl: 3    dicyclomine (BENTYL) 10 MG capsule, Take 10 mg by mouth daily as needed., Disp: , Rfl:     docusate sodium (COLACE) 100 MG capsule, Take 100 mg by mouth 2 (two) times daily., Disp: , Rfl:     dofetilide (TIKOSYN) 500 MCG Cap, Take 1 capsule (500 mcg total) by mouth every 12 (twelve) hours. (Patient taking differently: Take 500 mcg by mouth every 12 (twelve) hours. (Takes at 8AM & 8PM)), Disp: 60 capsule, Rfl: 6    furosemide (LASIX) 40 MG tablet, TAKE 1 TABLET EVERY DAY, Disp: 90 tablet, Rfl: 3    glipiZIDE (GLUCOTROL) 5 MG tablet, TAKE 1 TABLET EVERY DAY, Disp: 90 tablet, Rfl: 3    losartan (COZAAR) 100 MG tablet, TAKE 1 TABLET EVERY DAY, Disp: 90 tablet, Rfl: 3    magnesium 200 mg Tab, Take 400 mg by mouth once daily., Disp: , Rfl:     pantoprazole (PROTONIX) 40 MG tablet, TAKE 1 TABLET EVERY DAY, Disp: 90 tablet, Rfl: 3    coenzyme Q10 (CO Q-10) 10 mg capsule, Take 10 mg by mouth once daily., Disp: , Rfl:     econazole nitrate 1 % cream, Apply topically 2 (two) times daily., Disp: 30 g, Rfl: 2    HYDROcodone-acetaminophen (NORCO)  mg per tablet, Take 1 tablet by mouth every 6 (six) hours as needed (pain). (Patient not taking: Reported on 7/21/2020), Disp: 10 tablet, Rfl: 0    metFORMIN (GLUCOPHAGE) 500 MG tablet, Take 1 tablet (500 mg total) by mouth once daily., Disp: 90 tablet, Rfl: 3    Protective Sensation (w/ 10 gram monofilament):  Right: Intact  Left: Intact    Visual Inspection:  Normal -  Bilateral    Pedal Pulses:  this session  No increased pain during session  Increased sway with EC romberg foam    Pain Level (0-10 scale) post treatment: 0/10  ASSESSMENT/Changes in Function: Pt remains highly motivated to progress in therapy with strength and balance. He demonstrates improving hip and right LE strength with biggest weakness in hip IR, abduction and right ankle inversion. He does still get dominated by extensor synergy during gait but has improving static balance. Will continue to progress right LE strength, balance and endurance for decreased fall risk and improved ease of ambulation and ADLs. Patient will continue to benefit from skilled PT services to modify and progress therapeutic interventions, address functional mobility deficits, address ROM deficits, address strength deficits, analyze and address soft tissue restrictions, analyze and cue movement patterns, analyze and modify body mechanics/ergonomics, assess and modify postural abnormalities and address imbalance/dizziness to attain remaining goals. [x]  See Plan of Care  [x]  See progress note/recertification  []  See Discharge Summary         Progress towards goals / Updated goals:  in 4 weeks:  1. Pt will increase FOTO score by 11 pts to improve function.   2. Pt will ambulate on various surfaces including outdoor terrain with SPC,  w/o LOB to improve ambulation tolerance outdoors to be able to go on Vacation .   3. Pt will improve Bilateral Hip flexor strength to 5/5 to ease with stepping strategies and negotiating steps and curbs.    4. Pt will hold an abdominal Tuck/Hold ex for 15 sec x5 to improve with core strength for improved Ambulation endurance.    Current: challenged with 10\" holds (5/27/22)   5. Pt will perform Modified Tandem stance EO x 30 sec to ease with progressive balance strategies during outdoor Ambulation.   Current: challenged with tandem EO and MSR EC (5/27/22) MET (6/3/22)    PLAN  [x]  Upgrade activities as tolerated     [x] "  Right: Present  Left: Present    Posterior tibialis:   Right:Present  Left: Present           Vitals:   Vitals:    07/21/20 1331   BP: 122/68   Pulse: 84   Temp: 98.2 °F (36.8 °C)   TempSrc: Temporal   SpO2: 96%   Weight: (!) 138.3 kg (304 lb 14.3 oz)   Height: 6' 2" (1.88 m)      Assessment:       Patient Active Problem List    Diagnosis Date Noted    Perirectal abscess 03/25/2020    Class 2 severe obesity due to excess calories with serious comorbidity and body mass index (BMI) of 39.0 to 39.9 in adult 01/22/2020    Wheezing 01/22/2020    Localized edema 01/22/2020    Hammer toes of both feet 08/12/2019    Diabetic polyneuropathy associated with type 2 diabetes mellitus 08/12/2019    Prostate cancer screening 07/19/2019    Obesity (BMI 30-39.9)     Mixed hyperlipidemia     Edema     BPH (benign prostatic hyperplasia)     Primary osteoarthritis of left knee 02/07/2018    Type 2 diabetes mellitus without complication, without long-term current use of insulin 11/16/2017    Diffuse esophageal spasm 12/04/2016    Benign prostate hyperplasia 05/11/2016    Gout 09/29/2014    Paroxysmal atrial fibrillation 05/29/2014    Coronary artery disease + Ectasia, h/o CABG & stentsCABG x 1, LIMA to LAD 8/2003 [place], Stent RCA 2002, stent LCx 7/09, stent ostial LAD, LCx 11/11, 7/18 stent prox LAD 4.0 x 28 Synergy 12/20/2010    Essential hypertension 12/20/2010    Class 2 obesity with body mass index (BMI) of 39.0 to 39.9 in adult 12/20/2010    Gastroesophageal reflux disease without esophagitis 08/20/2010          Plan:       Nate Bah  was seen today for follow-up and may need lab work.    Diagnoses and all orders for this visit:    Nate was seen today for diabetes.    Diagnoses and all orders for this visit:    Wellness examination    Epistaxis  -     Ambulatory referral/consult to ENT; Future    Diabetes mellitus type 2, uncontrolled, without complications  -     Hemoglobin A1C; Future  -     Lipid " Continue plan of care  []  Update interventions per flow sheet       []  Discharge due to:_  []  Other:_      Nayely Dinh, PTA 6/8/2022  1:27 PM    Future Appointments   Date Time Provider Marc Peg   6/8/2022  2:15 PM Isabell Doctor, PTA [facility]   6/14/2022 10:30 AM Isabell Doctor, PTA [facility]   6/17/2022 12:00 PM Sil Montanez, PT [facility]   6/20/2022  3:00 PM Isabell Oscar, PTA [facility]   6/23/2022 10:30 AM Isabell Doctor, PTA [facility]   6/27/2022 10:30 AM Isabell Doctor, PTA [facility]   6/30/2022 10:30 AM Isabell Doctor, PTA [facility]   8/30/2022 10:00 AM Allen Monteiro   9/6/2022 10:00 AM PRANAY Arredondo [facility]   12/8/2022  8:00 AM Citlali Ramírez MD [facility] AMB Panel; Future    Class 2 severe obesity due to excess calories with serious comorbidity and body mass index (BMI) of 39.0 to 39.9 in adult  Will exercise after surgery to lose weight     Essential hypertension  Controlled     Mixed hyperlipidemia  Check labs      Other orders  -     (In Office Administered) Pneumococcal Polysaccharide Vaccine (23 Valent) (SQ/IM)

## 2022-06-14 ENCOUNTER — HOSPITAL ENCOUNTER (OUTPATIENT)
Dept: PHYSICAL THERAPY | Age: 67
Discharge: HOME | End: 2022-06-14
Payer: MEDICARE

## 2022-06-14 PROCEDURE — 97112 NEUROMUSCULAR REEDUCATION: CPT

## 2022-06-14 PROCEDURE — 97110 THERAPEUTIC EXERCISES: CPT

## 2022-06-17 ENCOUNTER — HOSPITAL ENCOUNTER (OUTPATIENT)
Dept: PHYSICAL THERAPY | Age: 67
Discharge: HOME | End: 2022-06-17
Payer: MEDICARE

## 2022-06-17 PROCEDURE — 97110 THERAPEUTIC EXERCISES: CPT

## 2022-06-17 PROCEDURE — 97112 NEUROMUSCULAR REEDUCATION: CPT

## 2022-06-17 NOTE — PROGRESS NOTES
PT DAILY TREATMENT NOTE     Patient Name: Zackary Kitchen  Date:2022  : 1955  [x]  Patient  Verified  Payor: VA MEDICARE / Plan: VA MEDICARE PART A & B / Product Type: Medicare /    In time:1203  Out time:1257  Total Treatment Time (min): 54  Visit #: 7 of     Medicare/BCBS Only   Total Timed Codes (min):  54 1:1 Treatment Time:  47       Treatment Area: Weakness of left lower extremity [R29.898]  Weakness of right lower extremity [R29.898]    SUBJECTIVE  Pain Level (0-10 scale): 0/10  Any medication changes, allergies to medications, adverse drug reactions, diagnosis change, or new procedure performed?: [x] No    [] Yes (see summary sheet for update)  Subjective functional status/changes:   [] No changes reported  Reports he is Going to a [employer] that will involve a lot of walking. OBJECTIVE    31 min Therapeutic Exercise:  [] See flow sheet :   Rationale: increase ROM, increase strength, improve coordination and improve balance to improve the patients ability to ease with adl's     23 min Neuromuscular Re-education:  []  See flow sheet :   Rationale: improve coordination, improve balance and increase proprioception  to improve the patients ability to ease with adl's          With   [] TE   [] TA   [] neuro   [] other: Patient Education: [x] Review HEP    [] Progressed/Changed HEP based on:   [] positioning   [] body mechanics   [] transfers   [] heat/ice application    [] other:      Other Objective/Functional Measures: tuck/ hold x 5 for 15 sec    Romberg on Foam with Heat turns 30 secx 2  Edge of bed hip extension/flexion with manual resistance x15  Ankle Theraband x 4 with manual assist only to decrease compensation . Pt declined SL Bridges today due to cramping to his left Hamstring. Pain Level (0-10 scale) post treatment: 0/10    ASSESSMENT/Changes in Function: Pt reports feeling more confident in his ability to cover more terrain during amb.  H walks 45 mins almost daily and uses hi Cubee when he doesn't walk outside. Patient will continue to benefit from skilled PT services to modify and progress therapeutic interventions, address functional mobility deficits, address ROM deficits, address strength deficits, analyze and address soft tissue restrictions, analyze and cue movement patterns, analyze and modify body mechanics/ergonomics, assess and modify postural abnormalities and address imbalance/dizziness to attain remaining goals. []  See Plan of Care  []  See progress note/recertification  []  See Discharge Summary         Progress towards goals / Updated goals:  in 4 weeks:  1. Pt will increase FOTO score by 11 pts to improve function.     2. Pt will ambulate on various surfaces including outdoor terrain with SPC,  w/o LOB to improve ambulation tolerance outdoors to be able to go on Vacation .   Pt reports he walks outdoors 45 mins daily, when weather is cool. Progressing 6/17/22     3. Pt will improve Bilateral Hip flexor strength to 5/5 to ease with stepping strategies and negotiating steps and curbs.      4. Pt will hold an abdominal Tuck/Hold ex for 15 sec x5 to improve with core strength for improved Ambulation endurance. Current: challenged with 10\" holds (5/27/22)   Current: MET: 15 sec x5    5.  Pt will perform Modified Tandem stance EO x 30 sec to ease with progressive balance strategies during outdoor Ambulation.   Current: challenged with tandem EO and MSR EC (5/27/22) MET (6/3/22)         PLAN  []  Upgrade activities as tolerated     []  Continue plan of care  []  Update interventions per flow sheet       []  Discharge due to:_  []  Other:_      Delmar Riggins, PT 6/17/2022  12:01 PM    Future Appointments   Date Time Provider Marc Ruvalcaba   6/20/2022  3:00 PM Oral Weathers, PTA [facility]   6/23/2022 10:30 AM Renkimberly Gomesing, PTA [facility]   6/27/2022 10:30 AM Oral Gomesing, PTA [facility]   6/30/2022 10:30 AM Oral Gomesing, PTA [facility] 8/30/2022 10:00 AM [facility]URSE [facility] SCHED   9/6/2022 10:00 [facility]   12/8/2022  8:00 AM Tereza Sierra MD [facility] AMB

## 2022-06-20 ENCOUNTER — HOSPITAL ENCOUNTER (OUTPATIENT)
Dept: PHYSICAL THERAPY | Age: 67
Discharge: HOME | End: 2022-06-20
Payer: MEDICARE

## 2022-06-20 PROCEDURE — 97110 THERAPEUTIC EXERCISES: CPT

## 2022-06-20 PROCEDURE — 97112 NEUROMUSCULAR REEDUCATION: CPT

## 2022-06-20 NOTE — PROGRESS NOTES
PT DAILY TREATMENT NOTE     Patient Name: Massimo Polo  Date:2022  : 1955  [x]  Patient  Verified  Payor: VA MEDICARE / Plan: VA MEDICARE PART A & B / Product Type: Medicare /    In time: 3:00  Out time: 3:45  Total Treatment Time (min): 45  Visit #: 8 of     Medicare/BCBS Only   Total Timed Codes (min): 45   1:1 Treatment Time:  45       Treatment Area: Weakness of left lower extremity [R29.898]  Weakness of right lower extremity [R29.898]    SUBJECTIVE  Pain Level (0-10 scale): 0/10  Any medication changes, allergies to medications, adverse drug reactions, diagnosis change, or new procedure performed?: [x] No    [] Yes (see summary sheet for update)  Subjective functional status/changes:   [] No changes reported  Pt reports feeling no current functional deficits just trying to get whatever strength he can in his right leg. He states hard to keep the knee unlocked with walking and can feel when it snaps back.        OBJECTIVE    30 min Therapeutic Exercise:  [] See flow sheet :   Rationale: increase ROM, increase strength, improve coordination and improve balance to improve the patients ability to ease with adl's     15 min Neuromuscular Re-education:  []  See flow sheet :   Rationale: improve coordination, improve balance and increase proprioception  to improve the patients ability to ease with adl's          With   [] TE   [] TA   [] neuro   [] other: Patient Education: [x] Review HEP    [] Progressed/Changed HEP based on:   [] positioning   [] body mechanics   [] transfers   [] heat/ice application    [] other:      Other Objective/Functional Measures  In //, able to reduce extensor synergy using mirror and working to keep right knee unlocked and reduce hip hike during swing phase  When he returns to [facility] out of bars he resorts to original habitual gait pattern likely due to decreased sturdiness of the [facility]  Improved right HS activation in standing but leg goes into ER due to hip IR weakness and synergy  Unable to perform hip IR AROM so assistance to perform in sitting due to weakness  Continues to attempt TFL compensation with hip abduction requiring Max assist for performing correctly to fire the right muscle  No LOB with wide CHRISTIAN EC foam but increased hand taps with romberg EC foam  Use of hip strategy with EC on foam   During tandem, cues to keep upright posture to avoid weight shift to the front leg  Challenged with SLS with attempt to decrease UE use  Educated for home to work on hip IR with BBK, decreased hip hike during walking, and upright posture with balance interventions at home to avoid compensatory strategies    Pain Level (0-10 scale) post treatment: 0/10    ASSESSMENT/Changes in Function:  Pt improving globally with right LE and core strength. He continues to have habitual extensor synergy gait pattern when walking with SPC but is able to lessen the effects in // with improved gait pattern. He lacks hip IR and abduction strength for SLS balance. He continues to use hip strategies for right ankle weakness on compliant surfaces. Will continue to progress remaining strength deficits with transition to home program at end of scheduled visits. Patient will continue to benefit from skilled PT services to modify and progress therapeutic interventions, address functional mobility deficits, address ROM deficits, address strength deficits, analyze and address soft tissue restrictions, analyze and cue movement patterns, analyze and modify body mechanics/ergonomics, assess and modify postural abnormalities and address imbalance/dizziness to attain remaining goals.      [x]  See Plan of Care  [x]  See progress note/recertification  []  See Discharge Summary         Progress towards goals / Updated goals:  in 4 weeks:  1. Pt will increase FOTO score by 11 pts to improve function.   2. Pt will ambulate on various surfaces including outdoor terrain with SPC,  w/o LOB to improve ambulation tolerance outdoors to be able to go on Vacation .   Pt reports he walks outdoors 45 mins daily, when weather is cool. Progressing 6/17/22   3. Pt will improve Bilateral Hip flexor strength to 5/5 to ease with stepping strategies and negotiating steps and curbs.    4. Pt will hold an abdominal Tuck/Hold ex for 15 sec x5 to improve with core strength for improved Ambulation endurance. Current: challenged with 10\" holds (5/27/22)   Current: MET: 15 sec x5  5.  Pt will perform Modified Tandem stance EO x 30 sec to ease with progressive balance strategies during outdoor Ambulation.   Current: challenged with tandem EO and MSR EC (5/27/22) MET (6/3/22)       PLAN  [x]  Upgrade activities as tolerated     [x]  Continue plan of care  []  Update interventions per flow sheet       []  Discharge due to:_  []  Other:_      Celestina Sheth, PTA 6/20/2022  12:01 PM    Future Appointments   Date Time Provider Marc Ruvalcaba   6/20/2022  3:00 PM Mery Guardado PTA [facility]   6/23/2022 10:30 AM Mery Guardado PTA [facility]   6/27/2022 10:30 AM Mery Guardado PTA [facility]   6/30/2022 10:30 AM Mery Guardado PTA [facility]   8/30/2022 10:00 AM Allen Monteiro   9/6/2022 10:00 AM PRANAY Mcclain [facility] ADEBAYO SCHED   12/8/2022  8:00 AM Natalya Ziegler MD [facility] AMB

## 2022-06-23 ENCOUNTER — HOSPITAL ENCOUNTER (OUTPATIENT)
Dept: PHYSICAL THERAPY | Age: 67
Discharge: HOME | End: 2022-06-23
Payer: MEDICARE

## 2022-06-23 PROCEDURE — 97112 NEUROMUSCULAR REEDUCATION: CPT

## 2022-06-23 PROCEDURE — 97110 THERAPEUTIC EXERCISES: CPT

## 2022-06-23 NOTE — PROGRESS NOTES
PT DAILY TREATMENT NOTE     Patient Name: Max Reveal  Date:2022  : 1955  [x]  Patient  Verified  Payor: VA MEDICARE / Plan: VA MEDICARE PART A & B / Product Type: Medicare /    In time: 10:31  Out time: 11:15  Total Treatment Time (min): 44  Visit #: 9 of     Medicare/BCBS Only   Total Timed Codes (min): 44   1:1 Treatment Time:  44       Treatment Area: Weakness of left lower extremity [R29.898]  Weakness of right lower extremity [R29.898]    SUBJECTIVE  Pain Level (0-10 scale): 0/10  Any medication changes, allergies to medications, adverse drug reactions, diagnosis change, or new procedure performed?: [x] No    [] Yes (see summary sheet for update)  Subjective functional status/changes:   [] No changes reported  Pt reports no difficulty or LOB with SPC or outside ambulation. He is okay to D/C in 2 visits with updated HEP.       OBJECTIVE    29 min Therapeutic Exercise:  [x] See flow sheet :   Rationale: increase ROM, increase strength, improve coordination and improve balance to improve the patients ability to ease with adl's     15 min Neuromuscular Re-education:  [x]  See flow sheet : educated on balance progressions in corner of room; will provide handout   Rationale: improve coordination, improve balance and increase proprioception  to improve the patients ability to ease with adl's          With   [] TE   [] TA   [] neuro   [] other: Patient Education: [x] Review HEP    [] Progressed/Changed HEP based on:   [] positioning   [] body mechanics   [] transfers   [] heat/ice application    [] other:      Other Objective/Functional Measures  FOTO: 67/100 (17 point improvement)  Outdoor terrain surfaces with SPC: Met no concerns with balance regarding outside  Hip flexion MMT: left 4+/5 right 4/5  Increased right hip ER with resistance for hip flexion  Added BBK for hip IR and sit to stands for hip engagement  Educated on using corner of room with chair in front for balance interventions and safety  Educated on D/C in 2 visits; pt agreeable    Pain Level (0-10 scale) post treatment: 0/10     ASSESSMENT/Changes in Function:  See Recertification. Patient will continue to benefit from skilled PT services to modify and progress therapeutic interventions, address functional mobility deficits, address ROM deficits, address strength deficits, analyze and address soft tissue restrictions, analyze and cue movement patterns, analyze and modify body mechanics/ergonomics, assess and modify postural abnormalities and address imbalance/dizziness to attain remaining goals. [x]  See Plan of Care  [x]  See progress note/recertification  []  See Discharge Summary         Progress towards goals / Updated goals:  in 4 weeks:  1. Pt will increase FOTO score by 11 pts to improve function. Met   2. Pt will ambulate on various surfaces including outdoor terrain with SPC,  w/o LOB to improve ambulation tolerance outdoors to be able to go on Vacation .   Pt reports he walks outdoors 45 mins daily, when weather is cool. Progressing 6/17/22 MET  3. Pt will improve Bilateral Hip flexor strength to 5/5 to ease with stepping strategies and negotiating steps and curbs.  Progressed  4. Pt will hold an abdominal Tuck/Hold ex for 15 sec x5 to improve with core strength for improved Ambulation endurance. Current: challenged with 10\" holds (5/27/22)   Current: MET: 15 sec x5  5.  Pt will perform Modified Tandem stance EO x 30 sec to ease with progressive balance strategies during outdoor Ambulation.   Current: challenged with tandem EO and MSR EC (5/27/22) MET (6/3/22)       PLAN  [x]  Upgrade activities as tolerated     [x]  Continue plan of care  []  Update interventions per flow sheet       []  Discharge due to:_  []  Other:_      Sophia Griffin PTA 6/23/2022  12:01 PM    Future Appointments   Date Time Provider Marc Mirelesi   6/23/2022 10:30 AM Juan Fuentes PTA [facility]   6/27/2022 10:30 AM Juan Fuentes PTA [facility]   6/30/2022 10:30 AM Saira Erazo PTA [facility]   8/30/2022 10:00 AM Allen Monteiro   9/6/2022 10:00 AM PRANAY Howard [facility]   12/8/2022  8:00 AM Grace Segundo MD [facility] AMB

## 2022-06-23 NOTE — PROGRESS NOTES
[facility]  [facility]  [phone] fax    Continued Plan of Care/ Re-certification for Physical Therapy Services    Patient name: Sonya Ramsey Start of Care: 3/23/2022   Referral source: Vianey Biggs NP : 1955   Medical/Treatment Diagnosis: Weakness of left lower extremity [R29.898]  Weakness of right lower extremity [R29.898]  Payor: VA MEDICARE / Plan: VA MEDICARE PART A & B / Product Type: Medicare /  Onset Date:         Prior Hospitalization: see medical history Provider#: [id number]   Medications: Verified on Patient Summary List    Comorbidities: Stroke 2019 w/Right Hemiparesis, Arthritis, HBP. Prior Level of Function: Ambulates with SPC. Lives with wife in a 1 story house. Takes daily walks in his yard and has a [employer] for inside workouts.   Visits from [facility]: 26    Missed Visits: 0    The Plan of Care and following information is based on the patient's current status:  Goal: Pt will increase FOTO score by 11 pts to improve function. Status at last note/certification: 89/954  Current Status: met 67/100 (17 point improvement)    Goal: Pt will ambulate on various surfaces including outdoor terrain with SPC,  w/o LOB to improve ambulation tolerance outdoors to be able to go on Vacation. Status at last note/certification: outdoor ambulation for shopping  Current Status: met subjective reports of no difficulty    Goal: Pt will improve Bilateral Hip flexor strength to 5/5 to ease with stepping strategies and negotiating steps and curbs.   Status at last note/certification: 4/5  Current Status: not met right 4/5 left 4+/5    Goal: Pt will hold an abdominal Tuck/Hold ex for 15 sec x5 to improve with core strength for improved Ambulation endurance.    Status at last note/certification: unable  Current Status: met 15 second holds    Goal: Pt will perform Modified Tandem stance EO x 30 sec to ease with progressive balance strategies during outdoor Ambulation. Status at last note/certification: unable  Current Status: met no LOB    Key functional changes: improvement in strength, improved balance, increased ambulation tolerance     Problems/ barriers to goal attainment: none     Problem List: pain affecting function, decrease ROM, decrease strength, edema affecting function, impaired gait/ balance, decrease ADL/ functional abilitiies, decrease activity tolerance, decrease flexibility/ joint mobility and decrease transfer abilities    Treatment Plan: Therapeutic exercise, Therapeutic activities, Neuromuscular re-education, Physical agent/modality, Gait/balance training, Manual therapy, Patient education, Self Care training, Functional mobility training, Home safety training and Stair training     Patient Goal (s) has been updated and includes: \"increased strength\"    Goals for this certification period to be accomplished in 2 treatments:  1. Patient will be independent with a final, comprehensive home program for strengthening and balance to continue independent progression at home upon discharge from therapy. Frequency / Duration: Patient to be seen 1-2 times per week for 2 treatments:    Assessment / Recommendations: Mr. Clement Nj made excellent progress towards updated goals in therapy. He was highly motivated and determined to progress strength and balance and utilized all therapy recommendations. He is dominated by extensor synergy during gait due to habitual compensations but in static he is able to dissociate movement patterns for strengthening purposes. He improved his FOTO significantly to 67/100 (17 point improvement) indicating improved ease of functional mobility. He demonstrates most weakness in his right hip internal rotators due to prolonged use of ER activation as compensation. He has improving balance as strength improves and when aware to not utilize genu recurvatum on the right for stability. He also increased his core endurance to a 15 second hollow tuck hold time. Skilled PT remains medically necessary for 2 final sessions to implement and review a final comprehensive program for strengthening and balance progression independence for continued progress at home upon discharge from therapy. Certification Period: 6/24/2022 to 7/23/2022    Amee Hernandez PTA 6/23/2022 9:07 AM    ________________________________________________________________________  I certify that the above Therapy Services are being furnished while the patient is under my care. I agree with the treatment plan and certify that this therapy is necessary. [] I have read the above and request that my patient continue as recommended.   [] I have read the above report and request that my patient continue therapy with the following changes/special instructions: _______________________________________  [] I have read the above report and request that my patient be discharged from therapy    Physician's Signature:____________Date:_________TIME:________     Mary Jane Marshall NP  ** Signature, Date and Time must be completed for valid certification **    Please sign and return to [facility] [employer] [facility]  [address] [place] [address]  [phone] fax

## 2022-06-27 ENCOUNTER — HOSPITAL ENCOUNTER (OUTPATIENT)
Dept: PHYSICAL THERAPY | Age: 67
Discharge: HOME | End: 2022-06-27
Payer: MEDICARE

## 2022-06-27 PROCEDURE — 97112 NEUROMUSCULAR REEDUCATION: CPT

## 2022-06-27 PROCEDURE — 97110 THERAPEUTIC EXERCISES: CPT

## 2022-06-27 NOTE — PROGRESS NOTES
PT DAILY TREATMENT NOTE     Patient Name: Barbara Randle  Date:2022  : 1955  [x]  Patient  Verified  Payor: Shantell Rosales / Plan: VA MEDICARE PART A & B / Product Type: Medicare /    In time: 10:35  Out time: 11:15  Total Treatment Time (min): 40  Visit #: 1 of 2    Medicare/BCBS Only   Total Timed Codes (min):   40 1:1 Treatment Time:  38       Treatment Area: Weakness of left lower extremity [R29.898]  Weakness of right lower extremity [R29.898]    SUBJECTIVE  Pain Level (0-10 scale): 0/10   Any medication changes, allergies to medications, adverse drug reactions, diagnosis change, or new procedure performed?: [x] No    [] Yes (see summary sheet for update)  Subjective functional status/changes:   [] No changes reported  Pt reports no current pain. He states he is trying to get as close back to normal as possible. OBJECTIVE    25 min Therapeutic Exercise:  [x] See flow sheet :   Rationale: increase ROM, increase strength, improve coordination and improve balance to improve the patients ability to ease with adl's     15 min Neuromuscular Re-education:  [x]  See flow sheet :    Rationale: improve coordination, improve balance and increase proprioception  to improve the patients ability to ease with adl's          With   [] TE   [] TA   [] neuro   [] other: Patient Education: [x] Review HEP    [] Progressed/Changed HEP based on:   [] positioning   [] body mechanics   [] transfers   [] heat/ice application    [] other:      Other Objective/Functional Measures  Provided updated HEP  Reviewed balance progressions and safety for home  Continues to be challenged with side lying hip abduction to avoid TFL compensation  Fatigues with end range hip extension  No increased pain  Progressed with all balance forms    Pain Level (0-10 scale) post treatment:0/10    ASSESSMENT/Changes in Function:  Pt was initiate with final HEP for strengthening and balance progression.  He is able to functionally do all tasks at home and work on endurance conditioning with walking program. He demonstrates improved balance and strength. Will plan for D/C next visit after final review of HEP. Patient will continue to benefit from skilled PT services to modify and progress therapeutic interventions, address functional mobility deficits, address ROM deficits, address strength deficits, analyze and address soft tissue restrictions, analyze and cue movement patterns, analyze and modify body mechanics/ergonomics, assess and modify postural abnormalities and address imbalance/dizziness to attain remaining goals. [x]  See Plan of Care  [x]  See progress note/recertification  []  See Discharge Summary         Goals for this certification period to be accomplished in 2 treatments:  1. Patient will be independent with a final, comprehensive home program for strengthening and balance to continue independent progression at home upon discharge from therapy. Initiated (6/27/22)    PLAN  [x]  Upgrade activities as tolerated     [x]  Continue plan of care  []  Update interventions per flow sheet       []  Discharge due to:_  [x]  Other:D/C next visit.       Farooq Bar PTA 6/27/2022  12:01 PM    Future Appointments   Date Time Provider Marc Ruvalcaba   6/27/2022 10:30 AM Marci Carrasquillo PTA [facility]   6/30/2022 10:30 AM Marci Carrasquillo PTA [facility]   8/30/2022 10:00 AM Allen Monteiro   9/6/2022 10:00 AM PRANAY Horvath [facility] ADEBAYO SCHED   12/8/2022  8:00 AM Migel Keller MD [facility] AMB

## 2022-06-30 ENCOUNTER — HOSPITAL ENCOUNTER (OUTPATIENT)
Dept: PHYSICAL THERAPY | Age: 67
Discharge: HOME | End: 2022-06-30
Payer: MEDICARE

## 2022-06-30 PROCEDURE — 97110 THERAPEUTIC EXERCISES: CPT

## 2022-06-30 PROCEDURE — 97112 NEUROMUSCULAR REEDUCATION: CPT

## 2022-06-30 NOTE — PROGRESS NOTES
Physical Therapy Discharge Instructions      [facility] [employer] [facility]  [address] [place] [address]  [phone] fax    Patient: Sami Montanez  : 1955      Continue Home Exercise Program 3-4 days a week           Follow up with MD:     [] Upon completion of therapy     [x] As needed      Recommendations:     [x]   Return to activity with home program    []   Return to activity with the following modifications:       []Post Rehab Program    []Join Independent aquatic program     []Return to/join local gym        Additional Comments:           Santos Smith, PTA 2022 2:51 PM

## 2022-06-30 NOTE — PROGRESS NOTES
PT DAILY TREATMENT NOTE     Patient Name: Alina Garcia  Date:2022  : 1955  [x]  Patient  Verified  Payor: Betty Moss / Plan: [place] MED[place] & B / Product Type: Medicare /    In time: 10:35   Out time: 11:15  Total Treatment Time (min): 40  Visit #: 2 of 2    Medicare/BCBS Only   Total Timed Codes (min): 40 1:1 Treatment Time:  40       Treatment Area: Weakness of left lower extremity [R29.898]  Weakness of right lower extremity [R29.898]    SUBJECTIVE  Pain Level (0-10 scale):0/10  Any medication changes, allergies to medications, adverse drug reactions, diagnosis change, or new procedure performed?: [x] No    [] Yes (see summary sheet for update)  Subjective functional status/changes:   [] No changes reported  Pt reports no current pain. He states he reviewed his exercises but has had family in town so wasn't able to practice the balance exercises. He feels comfortable with what to work on at this time on his own and understands he can return to PT if he has difficulty with anything functionally in the future.      OBJECTIVE    25 min Therapeutic Exercise:  [x] See flow sheet :   Rationale: increase ROM, increase strength, improve coordination and improve balance to improve the patients ability to ease with adl's     15 min Neuromuscular Re-education:  [x]  See flow sheet : balance interventions   Rationale: improve coordination, improve balance and increase proprioception  to improve the patients ability to ease with adl's          With   [] TE   [] TA   [] neuro   [] other: Patient Education: [x] Review HEP    [] Progressed/Changed HEP based on:   [] positioning   [] body mechanics   [] transfers   [] heat/ice application    [] other:      Other Objective/Functional Measures  Reviewed final HEP  Cues to reduce TFL compensation with S/L abduction  Challenged with extensor synergy for standing DF on the right  Tightness and weakness with right hip IR  Improved glute activation with side lying donkey kicks with manual resist  Challenged with right ankle inversion/eversion stability on foam with EC with decreased balance with narrow support    Pain Level (0-10 scale) post treatment: 0/10    ASSESSMENT/Changes in Function:  Mr. Madi Grant made good progress in therapy meeting 1/1 final goals of being independent and compliant with his final HEP for strength and balance progression. He continued to demonstrate most weakness in the hip abductors, internal rotators and right ankle dorsiflexors. He is able to decrease synergy with tactile cues but returns to habitual gait pattern due to longevity of compensations. He improved his balance both static and dynamically with biggest struggle on compliant surfaces with vision removed. He is independent with a HEP consisting of strengthening and balance to continue progression upon discharge from therapy at this time. []  See Plan of Care  []  See progress note/recertification  [x]  See Discharge Summary         Goals for this certification period to be accomplished in 2 treatments:  1. Patient will be independent with a final, comprehensive home program for strengthening and balance to continue independent progression at home upon discharge from therapy. Initiated (6/27/22) MET (6/30/22)    PLAN  []  Upgrade activities as tolerated     []  Continue plan of care  []  Update interventions per flow sheet       [x]  Discharge due to: Independent with final HEP.   []  Other:    Ady Lan PTA 6/30/2022  12:01 PM    Future Appointments   Date Time Provider Marc Ruvalcaba   6/30/2022 10:30 AM Jennifer Kumar PTA [id number]   8/30/2022 10:00 AM Allen Monteiro   9/6/2022 10:00 AM PRANAY Brown [facility] ADEBAYO SCHED   12/8/2022  8:00 AM Arelis Ruth MD [facility] AMB

## 2022-10-14 ENCOUNTER — HOSPITAL ENCOUNTER (OUTPATIENT)
Dept: PHYSICAL THERAPY | Age: 67
Discharge: HOME | End: 2022-10-14
Payer: MEDICARE

## 2022-10-14 PROCEDURE — 97166 OT EVAL MOD COMPLEX 45 MIN: CPT

## 2022-10-14 PROCEDURE — 97535 SELF CARE MNGMENT TRAINING: CPT

## 2022-10-14 NOTE — PROGRESS NOTES
OT DAILY TREATMENT NOTE/HAND EVAL     Patient Name: Megan Espinoza  Date:10/14/2022  : 1955  [x]  Patient  Verified  Payor: [place] MEDICARE / Plan: [place] MEDICARE PART A & B / Product Type: Medicare /    In time: 8:20 Out time: 8:50  Total Treatment Time (min): 30  Visit #: 1 of 8    Medicare/BCBS Only   Total Timed Codes (min):  30 1:1 Treatment Time:  30     Treatment Area: Hemiplegia and hemiparesis following cerebral infarction affecting right dominant side [I69.351]  SUBJECTIVE  Pain Level (0-10 scale): 0/10      Pain Rating:   Current: 0/10 (pt reports consistently 0/10 pain)      Any medication changes, allergies to medications, adverse drug reactions, diagnosis change, or new procedure performed?: [x] No    [] Yes (see summary sheet for update)  Subjective functional status/changes:     PLOF: Independent in ADLs/IADLs   Limitations to PLOF: ROM, strength, activity tolerance, fine motor skills   Mechanism of Injury: CVA  Current symptoms/Complaints: Limited use of R UE   Previous Treatment/Compliance: PT/OT  PMHx/Surgical Hx: See medial hx   Pt Goals: \"To get more use out of this arm. \"   Motivation: Sefl-motiviated      OBJECTIVE/EXAMINATION    Hand Dominance: left handed   Hand Involved: right        22 min [x]Eval                  []Re-Eval       8 min Self Care/Home Management:    Rationale: increase independence in ADLs/IADLs.     -pt ed/discussion on CVA dx  -pt ed/discussion on treatment plan/\"maintenance\" program   -pt ed/discussion on current deficits with ADLs/IADLs  -applied x2 bandage to pt left elbow due to open cut             With   [] TE   [] TA   [] neuro   [] other: Patient Education: [] Review HEP    [] Progressed/Changed HEP based on:   [] positioning   [] body mechanics   [] transfers   [] heat/ice application    [] other:          Objective:  Range of Motion:     Active     Norms Right Left   Shoulder Flex 0-180 114 140   Elbow Ext/flex 0-150 20/73 0/WFL      Strength: Overall 3/5 R UE    Hand ROM: trace right tip pinch motion, trace right digit flexion     Hand Strength: Unable to test at this time    Gross Grasp 3pt Pinch Lateral Pinch Tip Pinch   Right  Not tested Not tested Not tested Not tested   Left Not tested Not tested Not tested Not tested     Nine-Hole Peg Test: Not appropriate for testing at this time  Finger Opposition: Not able to perform with right hand     ADLs  Feeding:        [x]MaxA   []ModA   []Luba   [] CGA   []SBA   []Erica   []Independent  *using left hand, right is propped up on table   UE Dressing:       []MaxA   []ModA   []Luba   [] CGA   []SBA   [x]Erica   []Independent  *using adaptive dressing techniques   LE Dressing:       []MaxA   []ModA   []Luba   [] CGA   []SBA   [x]Erica   []Independent  *using adaptive dressing techniques   Grooming:       [x]MaxA   []ModA   []Luba   [] CGA   []SBA   []Erica   []Independent  *using left hand only  Toileting:       []MaxA   []ModA   []Luba   [] CGA   []SBA   []Erica   [x]Independent  Bathing:       []MaxA   [x]ModA   []Luba   [] CGA   []SBA   []Erica   []Independent  *using right hand to hold shower bar, left to wash   Light Meal Prep:    []MaxA   [x]ModA   []Luba   [] CGA   []SBA   []Erica   []Independent  *using right UE to hold items in position  Household/Other:  []MaxA   [x]ModA   []Luba   [] CGA   []SBA   []Erica   []Independent  *using right as assist for turning wheel of riding mower, right assist for washing, washing dish with right support   Adaptive Equip:     []MaxA   []ModA   []Luba   [] CGA   []SBA   []Erica   []Independent  *mainly using the left, right is resting on console     Coordination   GM                           FM []WFL          [x] Impaired   []WFL          [x] Impaired    Tone/Motor Control []WFL          [x] Impaired    Cognition [x]Person         [x]Place            [x]Date   [x]Situation/ Behavior    Follows Commands  []     1-step  [] 2-step   [x]Multi-step      Memory:                             STM LTM   [x]WFL          [] Impaired   [x]WFL          [] Impaired    Safety Awareness [x]WFL          [] Impaired    Judgment  [x]WFL          [] Impaired    Express Needs [x]WFL          [] Impaired           Pain Level (0-10 scale) post treatment: 0/10     ASSESSMENT/Changes in Function: Decreased right shoulder flexion. Decreased right elbow extension/flexion. Decreased right UE strength via MMT. Trace right hand tip pinch and digit flexion. Decreased right hand fine motor skills. Max A to Mod A for ADLs/IADLs. Patient will continue to benefit from skilled OT services to modify and progress therapeutic interventions, address ROM deficits, address strength deficits, and instruct in home and community integration to attain remaining goals. [x]  See Plan of Care  []  See progress note/recertification  []  See Discharge Summary         Progress towards goals / Updated goals:    Short Term Goals: To be accomplished in 4 treatments:  Patient will be independent in HEP for UE ROM/stretches to maintain ROM for ADLs/IADLs. 2. Patient will be independent in HEP for UE strength to maintain strength for ADLs/IADLs. Long Term Goals: To be accomplished in 8 treatments:       Patient will be able to use right UE as an assist for 50% of the time or greater during self-care and IADL tasks. 2. Patient will be independent in demonstrating and performing activity modification strategies to aid in success for ADL/IADL tasks. 3. Patient will be able to pinch to retrieve small objects with the right hand in order to maintain fine motor skills for ADLs/IADLs.        PLAN  []  Upgrade activities as tolerated     [x]  Continue plan of care  []  Update interventions per flow sheet       []  Discharge due to:_  []  Other:_      RICHELLE Gonzales 10/14/2022  8:18 AM

## 2022-10-14 NOTE — PROGRESS NOTES
[facility] [place] [employer] [facility]  [address]  [phone] fax    Plan of Care/Statement of Necessity for Occupational Therapy Services    Patient name: Austyn Traore Start of Care: 10/14/2022   Referral source: Delphine Garcia NP : 1955    Medical Diagnosis: Hemiplegia and hemiparesis following cerebral infarction affecting right dominant side [I69.351]  Payor: VA MEDICARE / Plan: [place] MEDICARE PART A & B / Product Type: Medicare /  Onset Date: 2019     Treatment Diagnosis: CVA, Right hemiparesis    Prior Hospitalization: see medical history Provider#: [id number]   Medications: Verified on Patient summary List    Comorbidities: Hx of CVA, Arthritis, High blood pressure    Prior Level of Function: Independent in ADLs, yard work, watching football, travel, car washing           The Plan of Care and following information is based on the information from the initial evaluation. Assessment/ key information: Pt is a 74 y/o male who presents for an occupational therapy evaluation due to hx of CVA/right hemiparesis. Pt explains that he had a CVA in 2019. Pt previously has received occupational therapy services to address deficits associated with CVA. Pt reports continuing to perform exercises at home and using NMES. Pt reports that he is able to open the fridge, perform weightbearing, and use the right hand as assist in turning the wheel on the riding mower. Pt states that he is continuing to have deficits with use right hand for daily tasks. Pt reports no daily pain. Pt is recommended to participate in occupational therapy services for 8 visits to maintain ROM, strength and address activity modifications in order to improve independence in ADLs/IADLs.      -Objective information are as follows:     Objective:  Range of Motion:               Active       Norms Right Left   Shoulder Flex 0-180 114 140   Elbow Ext/flex 0-150 20/73 0/WFL Strength: Overall 3/5 R UE     Hand ROM: trace right tip pinch motion, trace right digit flexion      Hand Strength: Unable to test at this time     Gross Grasp 3pt Pinch Lateral Pinch Tip Pinch   Right  Not tested Not tested Not tested Not tested   Left Not tested Not tested Not tested Not tested      Nine-Hole Peg Test: Not appropriate for testing at this time  Finger Opposition: Not able to perform with right hand      ADLs  Feeding:                [x]MaxA   []ModA   []Luba   [] CGA   []SBA   []Erica   []Independent  *using left hand, right is propped up on table   UE Dressing:         []MaxA   []ModA   []Luba   [] CGA   []SBA   [x]Erica   []Independent  *using adaptive dressing techniques   LE Dressing:         []MaxA   []ModA   []Luba   [] CGA   []SBA   [x]Erica   []Independent  *using adaptive dressing techniques   Grooming:             [x]MaxA   []ModA   []Luba   [] CGA   []SBA   []Erica   []Independent  *using left hand only  Toileting:               []MaxA   []ModA   []Luba   [] CGA   []SBA   []Erica   [x]Independent  Bathing:                 []MaxA   [x]ModA   []Luba   [] CGA   []SBA   []Erica   []Independent  *using right hand to hold shower bar, left to wash   Light Meal Prep:    []MaxA   [x]ModA   []Luba   [] CGA   []SBA   []Erica   []Independent  *using right UE to hold items in position  Household/Other:  []MaxA   [x]ModA   []Luba   [] CGA   []SBA   []Erica   []Independent  *using right as assist for turning wheel of riding mower, right assist for washing, washing dish with right support   Adaptive Equip:     []MaxA   []ModA   []Luba   [] CGA   []SBA   []Erica   []Independent  *mainly using the left, right is resting on console      Coordination   GM                           FM []WFL          [x] Impaired   []WFL          [x] Impaired    Tone/Motor Control []WFL          [x] Impaired    Cognition [x]Person         [x]Place            [x]Date   [x]Situation/ Behavior    Follows Commands  []     1-step  [] 2-step   [x]Multi-step      Memory:                             STM                             LTM    [x]WFL          [] Impaired   [x]WFL          [] Impaired    Safety Awareness [x]WFL          [] Impaired    Judgment  [x]WFL          [] Impaired    Express Needs [x]WFL          [] Impaired        Evaluation Complexity: History LOW Complexity : Brief history review  Examination MEDIUM Complexity : 3-5 performance deficits relating to physical, cognitive , or psychosocial skils that result in activity limitations and / or participation restrictions Clinical Decision Making MEDIUM Complexity : Patient may present with comorbidities that affect occupational performnce. Miniml to moderate modification of tasks or assistance (eg, physical or verbal ) with assesment(s) is necessary to enable patient to complete evaluation   Overall Complexity Rating: MEDIUM    Problem List: Decreased range of motion, Decreased strength, Decreased coordination/prehension, Decreased ADL/functional abilities , Decreased activity tolerance, Decreased flexibility/joint mobility, and Other     Treatment Plan may include any combination of the following: Therapeutic exercise, Therapeutic activities, Neuromuscular re-education, Patient education, ADLs/IADLs, and Other    Patient / Family readiness to learn indicated by: asking questions, trying to perform skills, and interest    Persons(s) to be included in education:   patient (P)    Barriers to Learning/Limitations: yes;  physical    Patient Goal (s): To get more use out of my hand.     Patient Self Reported Health Status: good    Rehabilitation Potential: good    Short Term Goals: To be accomplished in 4 treatments:  Patient will be independent in HEP for UE ROM/stretches to maintain ROM for ADLs/IADLs. 2. Patient will be independent in HEP for UE strength to maintain strength for ADLs/IADLs. Long Term Goals:  To be accomplished in 8 treatments:   Patient will be able to use right UE as an assist for 50% of the time or greater during self-care and IADL tasks. 2. Patient will be independent in demonstrating and performing activity modification strategies to aid in success for ADL/IADL tasks. 3. Patient will be able to pinch to retrieve small objects with the right hand in order to maintain fine motor skills for ADLs/IADLs. Frequency / Duration: Patient to be seen 2 times per week for 8 treatments:    Patient/ Caregiver education and instruction: Diagnosis, prognosis, self care, activity modification, and exercises  [x]  Plan of care has been reviewed with ANDERSON    Certification Period: 10/14/2022 - 11/13/2022     Chayo Salazar OTR/L 10/14/2022 8:17 AM      ________________________________________________________________________    I certify that the above Therapy Services are being furnished while the patient is under my care. I agree with the treatment plan and certify that this therapy is necessary.     Physician's Signature:____________Date:_________TIME:________     Delphine Garcia NP  ** Signature, Date and Time must be completed for valid certification **    Please sign and return to [facility] [place] [facility]   [address] [facility] [facility]  [phone] fax

## 2022-11-01 ENCOUNTER — HOSPITAL ENCOUNTER (OUTPATIENT)
Dept: PHYSICAL THERAPY | Age: 67
Discharge: HOME | End: 2022-11-01
Payer: MEDICARE

## 2022-11-01 PROCEDURE — 97110 THERAPEUTIC EXERCISES: CPT

## 2022-11-01 PROCEDURE — 97530 THERAPEUTIC ACTIVITIES: CPT

## 2022-11-01 NOTE — PROGRESS NOTES
OT DAILY TREATMENT NOTE 10-18    Patient Name: Megan Espinoza  Date:2022  : 1955  [x]  Patient  Verified  Payor: VA MEDICARE / Plan: [place] MEDICARE PART A & B / Product Type: Medicare /    In time:900  Out time:945  Total Treatment Time (min): 39  Visit #: 2 of 8    Medicare/BCBS Only   Total Timed Codes (min):  45 1:1 Treatment Time:  39     Treatment Area: Hemiplegia and hemiparesis following cerebral infarction affecting right dominant side [I69.351]    SUBJECTIVE  Pain Level (0-10 scale): 0/10  Any medication changes, allergies to medications, adverse drug reactions, diagnosis change, or new procedure performed?: [x] No    [] Yes (see summary sheet for update)  Subjective functional status/changes:   [] No changes reported  Its hard to let go of things    OBJECTIVE      25 min Therapeutic Exercise:  [] See flow sheet :   Rationale: increase ROM and increase strength to improve the patients ability to reach     PROM- Right wrist  AAROM with blocking at elbow by ANDERSON for elbow flexion/Extension with focus on end ROM  AROM- Shoulder flexion, abduction    20 min Therapeutic Activity:  []  See flow sheet :   Rationale: increase ROM and improve coordination  to improve the patients ability to grasp and release    Grasp Saebo balls on table at midline with release to side   Grasp Sa[place]o Balls from right side on mat to place on table at midline  Pt education on mechanics of grasp and release         With   [] TE   [] TA   [] neuro   [] other: Patient Education: [x] Review HEP    [] Progressed/Changed HEP based on:   [] positioning   [] body mechanics   [] transfers   [] heat/ice application   [] Splint wear/care   [] Sensory re-education   [] scar management      [] other:            Other Objective/Functional Measures:     Difficulty with end Range of Motion      Pain Level (0-10 scale) post treatment: 0/10    ASSESSMENT/Changes in Function: good carryover from previous round of Occupational therapy, focus on release aspect and end ROM with Right UE    Patient will continue to benefit from skilled OT services to  to attain remaining goals. modify and progress therapeutic interventions, address ROM deficits, address strength deficits, analyze and cue movement patterns, and instruct in home and community integration     []  See Plan of Care  []  See progress note/recertification  []  See Discharge Summary         Progress towards goals / Updated goals:  Short Term Goals: To be accomplished in 4 treatments:  Patient will be independent in HEP for UE ROM/stretches to maintain ROM for ADLs/IADLs. Current Status (11/1/2022): reviewed       2. Patient will be independent in HEP for UE strength to maintain strength for ADLs/IADLs. Long Term Goals: To be accomplished in 8 treatments:       Patient will be able to use right UE as an assist for 50% of the time or greater during self-care and IADL tasks. 2. Patient will be independent in demonstrating and performing activity modification strategies to aid in success for ADL/IADL tasks. 3. Patient will be able to pinch to retrieve small objects with the right hand in order to maintain fine motor skills for ADLs/IADLs.            PLAN  []  Upgrade activities as tolerated     [x]  Continue plan of care  []  Update interventions per flow sheet       []  Discharge due to:_  []  Other:_      DARELL Thornton  11/1/2022  9:41 AM        Future Appointments   Date Time Provider Marc Ruvalcaba   11/3/2022  9:45 AM Roberta Garcia [id number] [facility]   11/9/2022  9:45 AM Roberta Garcia [id number] [facility]   11/11/2022  9:45 AM Nicolle Hernandez, OT [id number]   11/15/2022  9:00 AM Roberta Garcia [id number] [facility]   11/18/2022  9:00 AM Nicolle Hernandez, OT [id number]   11/22/2022  9:45 AM Roberta Garcia [id number] [facility]   11/29/2022  9:45 AM Roberta Garcia [id number] [facility]   11/30/2022 11:15 AM Roberta Garcia [id number] [facility]   12/5/2022 11:15 JONATHAN Garcia [id number] [facility] 12/7/2022 11:15 AM Laila Pereira [id number] [facility]   12/8/2022  8:00 AM Aidan Mccallum MD [facility]   9/5/2023  9:50 AM [facility] ADEBAYO LEAVITT   9/12/2023 10:00 AM PRANAY Lopez

## 2022-11-03 ENCOUNTER — HOSPITAL ENCOUNTER (OUTPATIENT)
Dept: PHYSICAL THERAPY | Age: 67
Discharge: HOME | End: 2022-11-03
Payer: MEDICARE

## 2022-11-03 PROCEDURE — 97530 THERAPEUTIC ACTIVITIES: CPT

## 2022-11-03 PROCEDURE — 97110 THERAPEUTIC EXERCISES: CPT

## 2022-11-03 NOTE — PROGRESS NOTES
OT DAILY TREATMENT NOTE 10-18    Patient Name: Jasper Stevenson  Date:11/3/2022  : 1955  [x]  Patient  Verified  Payor: [place] MEDICARE / Plan: [place] MEDICARE PART A & B / Product Type: Medicare /    In time:945  Out time:1030  Total Treatment Time (min): 45  Visit #: 3 of 8    Medicare/BCBS Only   Total Timed Codes (min):  45 1:1 Treatment Time:  39     Treatment Area: Hemiplegia and hemiparesis following cerebral infarction affecting right dominant side [I69.351]    SUBJECTIVE  Pain Level (0-10 scale): 0/10  Any medication changes, allergies to medications, adverse drug reactions, diagnosis change, or new procedure performed?: [x] No    [] Yes (see summary sheet for update)  Subjective functional status/changes:   [] No changes reported  I think I slept on my wrist wrong the other day     OBJECTIVE    35 min Therapeutic Activity:  []  See flow sheet :   Rationale: increase ROM and improve coordination  to improve the patients ability to manipulate items with Right hand     Grasp and release:   -1 in wooden pegs of various sizes- placed at midline- released on B sides  - 1 in pegs- removed with Right hand and placed into container on right side    Foam Puzzle- using left has stabilizer- placed Cheesh-Na and square into foam puzzle     10 min Therapeutic Exercise:  [] See flow sheet :   Rationale: increase ROM and increase strength to improve the patients ability to move UE freely     PROM- Right  -Wrist ROM  - Elbow  - Digits      With   [] TE   [] TA   [] neuro   [] other: Patient Education: [x] Review HEP    [] Progressed/Changed HEP based on:   [] positioning   [] body mechanics   [] transfers   [] heat/ice application   [] Splint wear/care   [] Sensory re-education   [] scar management      [] other:            Other Objective/Functional Measures:       Cueing to segment tasks due to fatigue  Cueing to slow down      Pain Level (0-10 scale) post treatment: 0/10    ASSESSMENT/Changes in Function: ongoing difficulty with volitional movements in Right hand, minimal movement in     Patient will continue to benefit from skilled OT services to modify and progress therapeutic interventions, address ROM deficits, address strength deficits, and instruct in home and community integration to attain remaining goals. []  See Plan of Care  []  See progress note/recertification  []  See Discharge Summary         Progress towards goals / Updated goals:  Short Term Goals: To be accomplished in 4 treatments:  Patient will be independent in HEP for UE ROM/stretches to maintain ROM for ADLs/IADLs. Current Status (11/1/2022): reviewed     2. Patient will be independent in HEP for UE strength to maintain strength for ADLs/IADLs. Long Term Goals: To be accomplished in 8 treatments:       Patient will be able to use right UE as an assist for 50% of the time or greater during self-care and IADL tasks. 2. Patient will be independent in demonstrating and performing activity modification strategies to aid in success for ADL/IADL tasks. 3. Patient will be able to pinch to retrieve small objects with the right hand in order to maintain fine motor skills for ADLs/IADLs. Current Status (11/3/2022):  Difficulty with maintaining pinch/    PLAN  []  Upgrade activities as tolerated     [x]  Continue plan of care  []  Update interventions per flow sheet       []  Discharge due to:_  []  Other:_      DARELL Ly  11/3/2022  10:22 AM        Future Appointments   Date Time Provider Marc Ruvalcaba   11/9/2022  9:45 AM Kelsea Knight CPKUAOW [facility]   11/11/2022  9:45 AM Lonny Peoples OT MMCPTPB [facility]   11/15/2022  9:00 AM Kelsea Knight HKWHDDK [facility]   11/18/2022  9:00 AM Lonny Peoples OT MMCPTPB [facility]   11/22/2022  9:45 AM Kelsea Knight LSSECXO [facility]   11/29/2022  9:45 AM Kelsea Knight EDHBNWJ [facility]   11/30/2022 11:15 AM Kelsea Knight VGAACEH [facility]   12/5/2022 11:15 AM Kelsea Knight MMCPTPB [facility] 12/7/2022 11:15 AM Mauro Bernard [id number] [facility]   12/8/2022  8:00 AM Kirby Small MD [facility]   9/5/2023  9:50 AM [facility] ADEBAYO LEAVITT   9/12/2023 10:00 AM PRANAY Cristina [address] [facility]

## 2022-11-09 ENCOUNTER — HOSPITAL ENCOUNTER (OUTPATIENT)
Dept: PHYSICAL THERAPY | Age: 67
Discharge: HOME | End: 2022-11-09
Payer: MEDICARE

## 2022-11-09 PROCEDURE — 97110 THERAPEUTIC EXERCISES: CPT

## 2022-11-09 PROCEDURE — 97530 THERAPEUTIC ACTIVITIES: CPT

## 2022-11-09 PROCEDURE — 97535 SELF CARE MNGMENT TRAINING: CPT

## 2022-11-09 NOTE — PROGRESS NOTES
OCCUPATIONAL THERAPY - DAILY TREATMENT NOTE    Patient Name: Marjorie Sauceda        Date: 2022  : 1955   YES Patient  Verified  Visit #:   4   of   8  Insurance: Payor: Jose L Nolan / Plan: VA MEDICARE PART A & B / Product Type: Medicare /      In time: 9:51 Out time: 10:30   Total Treatment Time: 39     Medicare/BCBS Time Tracking (below)   Total Timed Codes (min):  39 1:1 Treatment Time:  39     TREATMENT AREA =  right UE weakness    SUBJECTIVE    Pain Level (on 0 to 10 scale):  0  / 10   Medication Changes/New allergies or changes in medical history, any new surgeries or procedures? NO    If yes, update Summary List   Subjective Functional Status/Changes:  []  No changes reported     I haven't done that before (pt referring to pinching items with clip)       OBJECTIVE    14 min Therapeutic Exercise:  [x]  See flow sheet   Rationale:      increase ROM and increase strength to improve the patients ability to move UE freely     - PROM to wrist  - clips with sponges while seated- lateral pinch 1# with sponges on rolling chair at lowest level placing into cup on both sides     15 min Therapeutic Activity:    Rationale:  increase ROM and improve coordination  to improve the patients ability to manipulate items with Right hand     1\" pegs- removed with Right hand and placed into container on right side  Foam pieces picking up from nicolasa table while seated      10 min Self Care/Home Management:    Rationale:        - education and practice with rocker knife to firm blue putty  - education on using right hand as assist with feeding tasks and opening medicine bottles       min Patient Education:  YES  Reviewed HEP   []  Progressed/Changed HEP based on: Other Objective/Functional Measures:     Mod A picking up foam pieces with 1# clip  Independent with rocker knife  Decreased ability to release     Post Treatment Pain Level (on 0 to 10) scale:   0  / 10     ASSESSMENT  Assessment/Changes in Function: Pt still presents with decreased volitional movements of right hand. Pt demonstrated ability to complete lateral pinch. []  See Progress Note/Recertification   Patient will continue to benefit from skilled OT services to modify and progress therapeutic interventions, address ROM deficits, address strength deficits, analyze and cue movement patterns, and instruct in home and community integration to attain remaining goals. Progress toward goals / Updated goals:    Short Term Goals: To be accomplished in 4 treatments:  Patient will be independent in HEP for UE ROM/stretches to maintain ROM for ADLs/IADLs. Current Status (11/1/2022): reviewed     2. Patient will be independent in HEP for UE strength to maintain strength for ADLs/IADLs. Long Term Goals: To be accomplished in 8 treatments:       Patient will be able to use right UE as an assist for 50% of the time or greater during self-care and IADL tasks. 2. Patient will be independent in demonstrating and performing activity modification strategies to aid in success for ADL/IADL tasks. 3. Patient will be able to pinch to retrieve small objects with the right hand in order to maintain fine motor skills for ADLs/IADLs. Current Status (11/3/2022):  Difficulty with maintaining pinch/     PLAN  []  Upgrade activities as tolerated YES Continue plan of care   []  Discharge due to :    []  Other:      Therapist: Tonie Guzman OT    Date: 11/9/2022 Time: 8:14 AM     Future Appointments   Date Time Provider Marc Ruvalcaba   11/9/2022  9:45 AM Tristan Edmondson OT MMCPTPB [facility]   11/11/2022  9:45 AM Gretel People LUNKIUF [facility]   11/15/2022  9:00 AM Gretel People IHDAZFN [facility]   11/18/2022  9:00 AM Mulugeta Sabillon OT MMCPTPB [facility]   11/22/2022  9:45 AM Gretel People PAXSGBN [facility]   11/29/2022  9:45 AM Gretel People LGIIDDA [facility]   11/30/2022 11:15 AM Gretel People WMOJUEJ [facility]   12/5/2022 11:15 AM Gretel People MMCPTPB [facility] 12/7/2022 11:15 AM [facility]   12/8/2022  8:00 AM Aidan Mccallum MD [facility]   9/5/2023  9:50 AM [facility] ADEBAYO LEAVITT   9/12/2023 10:00 AM PRANAY Lopez [address] [facility]

## 2022-11-11 ENCOUNTER — HOSPITAL ENCOUNTER (OUTPATIENT)
Dept: PHYSICAL THERAPY | Age: 67
Discharge: HOME | End: 2022-11-11
Payer: MEDICARE

## 2022-11-11 PROCEDURE — 97530 THERAPEUTIC ACTIVITIES: CPT

## 2022-11-11 PROCEDURE — 97535 SELF CARE MNGMENT TRAINING: CPT

## 2022-11-11 PROCEDURE — 97110 THERAPEUTIC EXERCISES: CPT

## 2022-11-11 NOTE — PROGRESS NOTES
[facility] [employer] [facility]  [address]  [phone] fax    Continued Plan of Care/ Re-certification for Occupational Therapy Services    Patient name: Red Schultz Start of Care: 10/14/2022   Referral source: Fe Ventura NP : 1955               Medical Diagnosis: Hemiplegia and hemiparesis following cerebral infarction affecting right dominant side [I69.351]  Payor: VA MEDICARE / Plan: VA MEDICARE PART A & B / Product Type: Medicare /  Onset Date: 2019               Treatment Diagnosis: CVA, Right hemiparesis    Prior Hospitalization: see medical history Provider#: [id number]   Medications: Verified on Patient summary List    Comorbidities: Hx of CVA, Arthritis, High blood pressure    Prior Level of Function: Independent in ADLs, yard work, watching football, travel, car washing                                                                        Visits from [place] of Care: 5    Missed Visits: 0    The Plan of Care and following information is based on the patient's current status:    FOTO    10/14  Eval    Score 50 51   Change           Short Term Goals: To be accomplished in 4 treatments:  Patient will be independent in HEP for UE ROM/stretches to maintain ROM for ADLs/IADLs. Status at Eval: Not initited  Current Status (2022): Goal Met     2. Patient will be independent in HEP for UE strength to maintain strength for ADLs/IADLs. Status at Eval: Not initited  Current Status (2022): Goal Met     Long Term Goals: To be accomplished in 8 treatments:       Patient will be able to use right UE as an assist for 50% of the time or greater during self-care and IADL tasks. Status at Eval: not using  Current Status (2022): 25%     2. Patient will be independent in demonstrating and performing activity modification strategies to aid in success for ADL/IADL tasks.   Status at Eval: not initiated  Current Status (11/11/2022): Progressing, administered/fabricated toothbrush built up handle with band      3. Patient will be able to pinch to retrieve small objects with the right hand in order to maintain fine motor skills for ADLs/IADLs. Status at Eval:unable  Current Status (11/11/2022): able to  and place 1 in wooden cubes, goal continued for consistency     Key functional changes: ongoing cueing to slow down movements when performing tasks with Right UE, education on importance of incorporating Right UE into tasks      Problems/ barriers to goal attainment: physical      Treatment Plan: Therapeutic exercise, Therapeutic activities, Neuromuscular re-education, Manual therapy, Patient education, and ADLs/IADLs      Patient Goal (s) has been updated and includes: use hand more     Goals for this certification period to be accomplished in 3 treatments:  Long Term Goals: To be accomplished in 8 treatments:       Patient will be able to use right UE as an assist for 50% of the time or greater during self-care and IADL tasks. Status at PN (11/11/2022):  25%     2. Patient will be independent in demonstrating and performing activity modification strategies to aid in success for ADL/IADL tasks. Status at PN (11/11/2022):   Progressing, administered/fabricated toothbrush built up handle with band      3. Patient will be able to pinch to retrieve small objects with the right hand in order to maintain fine motor skills for ADLs/IADLs. Status at PN (11/11/2022):   able to  and place 1 in wooden cubes, goal continued for consistency     Frequency / Duration: Patient to be seen 2 times per week for 3 treatments:    Assessment / Recommendations: Patient will benefit from continued skilled occupational therapy to increase upper extremity function and functional independence.        Certification Period: 11/14/22-12/13/22    DARELL So  11/11/2022 9:27 AM    ________________________________________________________________________  I certify that the above Therapy Services are being furnished while the patient is under my care. I agree with the treatment plan and certify that this therapy is necessary.       Physician's Signature:____________Date:_________TIME:________     Francesca Montesinos NP  ** Signature, Date and Time must be completed for valid certification **      Please sign and return to [facility] - [facility]  [address] [place] [address]            [phone] fax

## 2022-11-11 NOTE — PROGRESS NOTES
OT DAILY TREATMENT NOTE 10-18    Patient Name: Maciej Whitfield  Date:2022  : 1955  [x]  Patient  Verified  Payor: VA MEDICARE / Plan: VA MEDICARE PART A & B / Product Type: Medicare /    In time:947  Out time:1030  Total Treatment Time (min): 42  Visit #: 5 of 8    Medicare/BCBS Only   Total Timed Codes (min):  42 1:1 Treatment Time:  42     Treatment Area: Hemiplegia and hemiparesis following cerebral infarction affecting right dominant side [I69.351]    SUBJECTIVE  Pain Level (0-10 scale): 0/10  Any medication changes, allergies to medications, adverse drug reactions, diagnosis change, or new procedure performed?: [x] No    [] Yes (see summary sheet for update)  Subjective functional status/changes:   [] No changes reported  Pt reporting using his Right UE for ADL/IADL's around 25% of the time    OBJECTIVE    12 min Therapeutic Exercise:  [] See flow sheet :   Rationale: increase ROM and increase strength to improve the patients ability to reach     Recheck for PN  PROM Wrist/Digits     20 min Therapeutic Activity:  []  See flow sheet :   Rationale: increase ROM and improve coordination  to improve the patients ability to manipulate small items/grasp/release      Recheck for PN  1 in wooden cubes- seated- 18x using various pinches   Velcro Chips: Seated, using various pinches against slight resistance    10 min Self Care/Home Management:    Rationale: increase ROM, increase strength, and improve coordination  to improve the patients ability to perform ADL/IADL's with increased independence      Recheck for PN  FOTO  Built up Handle with strap for brushing teeth       With   [] TE   [] TA   [] neuro   [] other: Patient Education: [x] Review HEP    [] Progressed/Changed HEP based on:   [] positioning   [] body mechanics   [] transfers   [] heat/ice application   [] Splint wear/care   [] Sensory re-education   [] scar management      [] other:            Other Objective/Functional Measures:     FOTO 10/14  [facility] 11/11   Score 50 51   Change            Pain Level (0-10 scale) post treatment: 0/10    ASSESSMENT/Changes in Function: ongoing cueing to slow down movements when performing tasks with Right UE, education on importance of incorporating Right UE into tasks    Patient will continue to benefit from skilled OT services to modify and progress therapeutic interventions, address ROM deficits, address strength deficits, and instruct in home and community integration to attain remaining goals. []  See Plan of Care  []  See progress note/recertification  []  See Discharge Summary         Progress towards goals / Updated goals:  Short Term Goals: To be accomplished in 4 treatments:  Patient will be independent in HEP for UE ROM/stretches to maintain ROM for ADLs/IADLs. Status at [facility]: Not initited  Current Status (11/11/2022): Goal Met    2. Patient will be independent in HEP for UE strength to maintain strength for ADLs/IADLs. Status at [facility]: Not initited  Current Status (11/11/2022): Goal Met    Long Term Goals: To be accomplished in 8 treatments:       Patient will be able to use right UE as an assist for 50% of the time or greater during self-care and IADL tasks. Status at [facility]: not using  Current Status (11/11/2022): 25%    2. Patient will be independent in demonstrating and performing activity modification strategies to aid in success for ADL/IADL tasks. Status at [facility]: not initiated  Current Status (11/11/2022): Progressing, administered/fabricated toothbrush built up handle with band     3. Patient will be able to pinch to retrieve small objects with the right hand in order to maintain fine motor skills for ADLs/IADLs.    Status at [facility]:unable  Current Status (11/11/2022): able to  and place 1 in wooden cubes, goal continued for consistency     PLAN  []  Upgrade activities as tolerated     [x]  Continue plan of care  []  Update interventions per flow sheet       []  Discharge due to:_  [] Other:_      Rich Savage ,ANDERSON/L  11/11/2022  8:48 AM        Future Appointments   Date Time Provider Marc Peg   11/11/2022  9:45 AM Urvashi Everett [id number] [facility]   11/15/2022  9:00 AM Urvashi Everett OXAOSUJ [facility]   11/18/2022  9:00 AM Lexi Machuca OT MMCPTPB [facility]   11/22/2022  9:45 AM Urvashi Everett ISCNRGH [facility]   11/29/2022  9:45 AM Urvashi Everett BUNIFMN [facility]   11/30/2022 11:15 AM Urvashi Everett DLAKMVQ [facility]   12/5/2022 11:15 AM Urvashi Everett DSYFPJK [facility]   12/7/2022 11:15 AM Urvashi Everett PWQZTON [facility]   12/8/2022  8:00 AM Angelique Lambert MD [facility]   9/5/2023  9:50 AM [facility] NOAM LEAVITT   9/12/2023 10:00 AM PRANAY Ocasio

## 2022-11-15 ENCOUNTER — HOSPITAL ENCOUNTER (OUTPATIENT)
Dept: PHYSICAL THERAPY | Age: 67
Discharge: HOME | End: 2022-11-15
Payer: MEDICARE

## 2022-11-15 PROCEDURE — 97535 SELF CARE MNGMENT TRAINING: CPT

## 2022-11-15 PROCEDURE — 97530 THERAPEUTIC ACTIVITIES: CPT

## 2022-11-15 PROCEDURE — 97110 THERAPEUTIC EXERCISES: CPT

## 2022-11-15 NOTE — PROGRESS NOTES
OT DAILY TREATMENT NOTE 10-18    Patient Name: Red Schultz  Date:11/15/2022  : 1955  [x]  Patient  Verified  Payor: VA MEDICARE / Plan: VA MEDICARE PART A & B / Product Type: Medicare /    In time:900  Out time:945  Total Treatment Time (min): 39  Visit #: 1 of 3    Medicare/BCBS Only   Total Timed Codes (min):  45 1:1 Treatment Time:  39     Treatment Area: Hemiplegia and hemiparesis following cerebral infarction affecting right dominant side [I69.351]    SUBJECTIVE  Pain Level (0-10 scale): 0/10  Any medication changes, allergies to medications, adverse drug reactions, diagnosis change, or new procedure performed?: [x] No    [] Yes (see summary sheet for update)  Subjective functional status/changes:   [] No changes reported  Patient reporting inability to pick items up from floor    OBJECTIVE    10 min Therapeutic Exercise:  [] See flow sheet :   Rationale: increase ROM and increase strength to improve the patients ability to move Right UE freely     Shrugs/Retraction   PROM Wrist     25 min Therapeutic Activity:  []  See flow sheet :   Rationale: increase ROM, improve coordination, and Patient education   to improve the patients ability to pick items up     Floor Item    Review of using NMES with active task   Foam Puzzle Piece Pickup and placement- in stand- table level with use of dycem to stop pieces from moving- 3x   Administered dycem for home use   1 in wooden dowel - from various heights from floor, varying lengths of dowel     10 min Self Care/Home Management:    Rationale:  Patient Education   to improve the patients ability to perform functional home tasks with increased safety     Review of use of Reacher for picking up items from floor  Safety with item  from floor- Suggested performing from seated place vs stand       With   [] TE   [] TA   [] neuro   [] other: Patient Education: [x] Review HEP    [] Progressed/Changed HEP based on:   [] positioning   [] body mechanics   [] transfers   [] heat/ice application   [] Splint wear/care   [] Sensory re-education   [] scar management      [] other:            Other Objective/Functional Measures:     Improved ability to  items from lower places as task progressed     Pain Level (0-10 scale) post treatment: 0/10    ASSESSMENT/Changes in Function: improving functional use of Right UE    Patient will continue to benefit from skilled OT services to modify and progress therapeutic interventions, address ROM deficits, address strength deficits, and instruct in home and community integration to attain remaining goals. []  See Plan of Care  []  See progress note/recertification  []  See Discharge Summary         Progress towards goals / Updated goals:  Long Term Goals: To be accomplished in 8 treatments:       Patient will be able to use right UE as an assist for 50% of the time or greater during self-care and IADL tasks. Status at PN (11/11/2022):  25%     2. Patient will be independent in demonstrating and performing activity modification strategies to aid in success for ADL/IADL tasks. Status at PN (11/11/2022):   Progressing, administered/fabricated toothbrush built up handle with band      3. Patient will be able to pinch to retrieve small objects with the right hand in order to maintain fine motor skills for ADLs/IADLs.    Status at PN (11/11/2022):   able to  and place 1 in wooden cubes, goal continued for consistency     PLAN  []  Upgrade activities as tolerated     [x]  Continue plan of care  []  Update interventions per flow sheet       []  Discharge due to:_  []  Other:_      DARELL Liz  11/15/2022  9:04 AM        Future Appointments   Date Time Provider Marc Ruvalcaba   11/18/2022  9:00 AM Kavon Fountain OT MMCPTPB [facility]   11/22/2022  9:45 AM Marquita Florentino IHRCGWF [facility]   11/29/2022  9:45 AM Marquita Florentino YUBDZNP [facility]   11/30/2022 11:15 AM Marquita Florentino MMCPTPB [facility] 12/5/2022 11:15 AM [facility] KFTSSOD [facility]   12/7/2022 11:15 AM [facility] UFSNYAA [facility]   12/8/2022  8:00 AM Lore Stone MD [facility]   9/5/2023  9:50 AM [facility] NOAM LEAVITT   9/12/2023 10:00 AM PRANAY Vasquez [address] [facility]

## 2022-11-18 ENCOUNTER — HOSPITAL ENCOUNTER (OUTPATIENT)
Dept: PHYSICAL THERAPY | Age: 67
Discharge: HOME | End: 2022-11-18
Payer: MEDICARE

## 2022-11-18 PROCEDURE — 97530 THERAPEUTIC ACTIVITIES: CPT

## 2022-11-18 PROCEDURE — 97535 SELF CARE MNGMENT TRAINING: CPT

## 2022-11-18 PROCEDURE — 97110 THERAPEUTIC EXERCISES: CPT

## 2022-11-18 NOTE — PROGRESS NOTES
OCCUPATIONAL THERAPY - DAILY TREATMENT NOTE    Patient Name: Saloni Rodriguez        Date: 2022  : 1955   YES Patient  Verified  Visit #:   2   of   3  Insurance: Payor: José Guerreroana maria / Plan: [place] MEDICARE PART A & B / Product Type: Medicare /      In time: 9:00 Out time: 9:45   Total Treatment Time: 45     Medicare/BCBS Time Tracking (below)   Total Timed Codes (min):  45 1:1 Treatment Time:  45     TREATMENT AREA =  Right UE    SUBJECTIVE    Pain Level (on 0 to 10 scale):  0  / 10   Medication Changes/New allergies or changes in medical history, any new surgeries or procedures? NO    If yes, update Summary List   Subjective Functional Status/Changes:  []  No changes reported     \"I want to be able to stand and hold the mower and clean stuff in the house. \"           OBJECTIVE    8 min Therapeutic Exercise:  [x]  See flow sheet   Rationale:      increase ROM and increase strength to improve the patients ability to reach, , and pinch for ADL participation.     -see flow sheet   -shrugs/retraction x15 each   -towel slides (scrubbing) table top x15        20 min Therapeutic Activity:    Rationale:    increase ROM and improve coordination to improve the patients ability to reach, grasp, and manipulate items for ADL participation.     -foam blocks lateral pinch from towel on table top dropping in container (in standing) x2 rounds  -cylindrical pegs  from various heights from floor x4 rounds (in sitting)      17 min Self Care/Home Management:    Rationale:    increase ROM, grasp and manipulation of objects to improve ADL/IADL participation. -folding laundry practice from table top (in standing)  -reaching from sitting to  laundry items from floor and placing in basket      min Patient Education:  NO  Reviewed HEP   []  Progressed/Changed HEP based on:         Other Objective/Functional Measures:    -Min cueing for folding laundry practice   -Min A from left hand to place laundry item in right hand for lateral pinch to begin fold of clothing item   -HOHA for towel slides (scrubbing) table top   -70% accuracy with cylindrical peg  from board to drop in bucket      Post Treatment Pain Level (on 0 to 10) scale:   0  / 10     ASSESSMENT  Assessment/Changes in Function:     Pt progressing towards goals      []  See Progress Note/Recertification   Patient will continue to benefit from skilled OT services to modify and progress therapeutic interventions, address ROM deficits, analyze and cue movement patterns, and instruct in home and community integration to attain remaining goals. Progress toward goals / Updated goals:    Patient will be able to use right UE as an assist for 50% of the time or greater during self-care and IADL tasks. Status at PN (11/11/2022):  25%     2. Patient will be independent in demonstrating and performing activity modification strategies to aid in success for ADL/IADL tasks. Status at PN (11/11/2022):   Progressing, administered/fabricated toothbrush built up handle with band      3. Patient will be able to pinch to retrieve small objects with the right hand in order to maintain fine motor skills for ADLs/IADLs.    Status at PN (11/11/2022):   able to  and place 1 in wooden cubes, goal continued for consistency      PLAN  []  Upgrade activities as tolerated YES Continue plan of care   []  Discharge due to :    []  Other:      Therapist: MALLIKA Guerin/ROVERTO    Date: 11/18/2022 Time: 8:46 AM     Future Appointments   Date Time Provider Marc Ruvalcaba   11/18/2022  9:00 AM Enrique Omalley OT [id number] [facility]   11/22/2022  9:45 AM Estela Masters SEVEZAH [facility]   11/29/2022  9:45 AM Estela Masters VBAHQRI [facility]   11/30/2022 11:15 AM Estela Masters [id number] [facility]   12/5/2022 11:15 AM Estela Masters [id number] [facility]   12/7/2022 11:15 AM Estela Masters OKOYSLF [facility]   12/8/2022  8:00 AM Lamin Dial MD [facility]   9/5/2023  9:50 AM [facility] NURSE Beck   9/12/2023 10:00 AM PRANAY Arana

## 2022-11-22 ENCOUNTER — HOSPITAL ENCOUNTER (OUTPATIENT)
Dept: PHYSICAL THERAPY | Age: 67
Discharge: HOME | End: 2022-11-22
Payer: MEDICARE

## 2022-11-22 PROCEDURE — 97110 THERAPEUTIC EXERCISES: CPT

## 2022-11-22 PROCEDURE — 97530 THERAPEUTIC ACTIVITIES: CPT

## 2022-11-22 PROCEDURE — 97535 SELF CARE MNGMENT TRAINING: CPT

## 2022-11-22 NOTE — PROGRESS NOTES
OT DAILY TREATMENT NOTE 10-18    Patient Name: Karlie Gauthier  Date:2022  : 1955  [x]  Patient  Verified  Payor: VA MEDICARE / Plan: VA MEDICARE PART A & B / Product Type: Medicare /    In time:948  Out time:1030  Total Treatment Time (min): 42  Visit #: 3 of 3    Medicare/BCBS Only   Total Timed Codes (min):  42 1:1 Treatment Time:  42     Treatment Area: Hemiplegia and hemiparesis following cerebral infarction affecting right dominant side [I69.351]    SUBJECTIVE  Pain Level (0-10 scale): 0/10  Any medication changes, allergies to medications, adverse drug reactions, diagnosis change, or new procedure performed?: [x] No    [] Yes (see summary sheet for update)  Subjective functional status/changes:   [] No changes reported  Patient reporting he wishes to pick items up from the floor, Use Right UE for home care chores such as dusting and vacuuming, and to be able to use BUE when mowing the lawn.      OBJECTIVE    12 min Therapeutic Exercise:  [] See flow sheet :   Rationale: increase ROM and increase strength to improve the patients ability to move Right UE freely     Recheck for PN  Shrugs/Retraction  PROM- Wrist Flexion/Extension    15 min Therapeutic Activity:  []  See flow sheet :   Rationale: increase ROM and improve coordination  to improve the patients ability to grasp and release,  small items     Recheck for PN  Floor Item - 4 varied articles of clothing   1# lateral pinch with graded clothes pins- in stand- foam pieces     15 min Self Care/Home Management:    Rationale: increase ROM, increase strength, and improve coordination  to improve the patients ability to perform self and home care with increased independence     Recheck for PN  Towel Folding- Using BUE- in stand- table level  Clothing Folding-Using BUE- I stand- table level       With   [] TE   [] TA   [] neuro   [] other: Patient Education: [x] Review HEP    [] Progressed/Changed HEP based on:   [] positioning   [] body mechanics   [] transfers   [] heat/ice application   [] Splint wear/care   [] Sensory re-education   [] scar management      [] other:            Other Objective/Functional Measures:     Folding: Able to use Right UE with various pinches to fold towel      Pain Level (0-10 scale) post treatment: 0/10    ASSESSMENT/Changes in Function: improving incorporation of Right UE into tasks as functional assist, improving with volitional use when picking up smaller items     Patient will continue to benefit from skilled OT services to modify and progress therapeutic interventions, address ROM deficits, address strength deficits, and instruct in home and community integration to attain remaining goals. []  See Plan of Care  []  See progress note/recertification  []  See Discharge Summary         Progress towards goals / Updated goals:  Patient will be able to use right UE as an assist for 50% of the time or greater during self-care and IADL tasks. Status at PN (11/11/2022):  25%  Current Status (11/22/2022): 30-35%, Progressing     2. Patient will be independent in demonstrating and performing activity modification strategies to aid in success for ADL/IADL tasks. Status at PN (11/11/2022):   Progressing, administered/fabricated toothbrush built up handle with band   Current Status (11/22/2022): Progressing, using adaptive toothbrush gamble with Left UE assist     3. Patient will be able to pinch to retrieve small objects with the right hand in order to maintain fine motor skills for ADLs/IADLs.    Status at PN (11/11/2022):   able to  and place 1 in wooden cubes, goal continued for consistency   Current Status (11/22/2022): Progressing, goal continued for increased carryover and performance       PLAN  []  Upgrade activities as tolerated     [x]  Continue plan of care  []  Update interventions per flow sheet       []  Discharge due to:_  []  Other:_      DARELL Montalvo  11/22/2022  9:46 AM        Future Appointments   Date Time Provider Marc Peg   11/29/2022  9:45 AM Chelita Sifuentes [id number] [facility]   11/30/2022 11:15 AM Chelita Sifuentes [id number] [facility]   12/5/2022 11:30 AM Chelita Sifuentes [id number] [facility]   12/7/2022 11:30 AM Chelita Sifuentes [id number] [facility]   12/8/2022  8:00 AM Virginia Orellana MD [facility]   9/5/2023  9:50 AM [facility] NURSE NOAM LEAVITT   9/12/2023 10:00 AM PRANAY Webb

## 2022-11-22 NOTE — PROGRESS NOTES
[facility]  [place]  [address] [place] [address]  [phone] fax    Continued Plan of Care/ Re-certification for Occupational Therapy Services    Patient name: Austyn Traore Start of Care: 10/14/2022   Referral source: Delphine Garcia NP : 1955               Medical Diagnosis: Hemiplegia and hemiparesis following cerebral infarction affecting right dominant side [I69.351]  Payor: VA MEDICARE / Plan: VA MEDICARE PART A & B / Product Type: Medicare /  Onset Date: 2019               Treatment Diagnosis: CVA, Right hemiparesis    Prior Hospitalization: see medical history Provider#: [id number]   Medications: Verified on Patient summary List    Comorbidities: Hx of CVA, Arthritis, High blood pressure    Prior Level of Function: Independent in ADLs, yard work, watching football, travel, car washing                                                                        Visits from [place] of Care: 8    Missed Visits: 0    The Plan of Care and following information is based on the patient's current status:    Progress towards goals / Updated goals:  Patient will be able to use right UE as an assist for 50% of the time or greater during self-care and IADL tasks. Status at PN (2022):  25%  Current Status (2022): 30-35%, Progressing      2. Patient will be independent in demonstrating and performing activity modification strategies to aid in success for ADL/IADL tasks. Status at PN (2022):   Progressing, administered/fabricated toothbrush built up handle with band   Current Status (2022): Progressing, using adaptive toothbrush gamble with Left UE assist      3. Patient will be able to pinch to retrieve small objects with the right hand in order to maintain fine motor skills for ADLs/IADLs.    Status at PN (2022):   able to  and place 1 in wooden cubes, goal continued for consistency   Current Status (2022): Progressing, goal continued for increased carryover and performance     Key functional changes:  improving incorporation of Right UE into tasks as functional assist, improving with volitional use when picking up smaller items       Problems/ barriers to goal attainment: physical      Treatment Plan: Therapeutic exercise, Therapeutic activities, Neuromuscular re-education, Physical agent/modality, Manual therapy, Patient education, and ADLs/IADLs      Patient Goal (s) has been updated and includes: Use Both hands to [employer],      Goals for this certification period to be accomplished in 4 weeks:    Progress towards goals / Updated goals:  Patient will be able to use right UE as an assist for 50% of the time or greater during self-care and IADL tasks. Status at PN : 30-35%, Progressing      2. Patient will be independent in demonstrating and performing activity modification strategies to aid in success for ADL/IADL tasks. Status at PN:  Progressing, using adaptive toothbrush gamble with Left UE assist      3. Patient will be able to pinch to retrieve small objects with the right hand in order to maintain fine motor skills for ADLs/IADLs. Status at PN :Progressing, goal continued for increased carryover and performance     4. Patient will report improved ability to perform house hold chores such as dusting and vacuuming while functional assist from Right UE. 5. Patient will demonstrate ability to  5 items from floor in clinic with Right UE due to improved gross motor control and improved pinch. Frequency / Duration: Patient to be seen 2 times per week for 4 weeks:    Assessment / Recommendations:Patient will benefit from continued skilled occupational therapy to increase upper extremity function and functional independence.        Certification Period: 11/23/22-12/22/22    DARELL Mckinley  11/22/2022 10:06 AM    ________________________________________________________________________  I certify that the above Therapy Services are being furnished while the patient is under my care. I agree with the treatment plan and certify that this therapy is necessary.       Physician's Signature:____________Date:_________TIME:________     Rishi Villegas NP  ** Signature, Date and Time must be completed for valid certification **      Please sign and return to [facility]  [address] [place] [address]            [phone] fax

## 2022-11-29 ENCOUNTER — HOSPITAL ENCOUNTER (OUTPATIENT)
Dept: PHYSICAL THERAPY | Age: 67
Discharge: HOME | End: 2022-11-29
Payer: MEDICARE

## 2022-11-29 PROCEDURE — 97530 THERAPEUTIC ACTIVITIES: CPT

## 2022-11-29 PROCEDURE — 97110 THERAPEUTIC EXERCISES: CPT

## 2022-11-29 NOTE — PROGRESS NOTES
OT DAILY TREATMENT NOTE 10-18    Patient Name: Margie Bar  Date:2022  : 1955  [x]  Patient  Verified  Payor: VA MEDICARE / Plan: VA MEDICARE PART A & B / Product Type: Medicare /    In time:945  Out time:1030  Total Treatment Time (min): 45  Visit #: 1 of 8    Medicare/BCBS Only   Total Timed Codes (min):  45 1:1 Treatment Time:  39     Treatment Area: Hemiplegia and hemiparesis following cerebral infarction affecting right dominant side [I69.351]    SUBJECTIVE  Pain Level (0-10 scale): 0/10  Any medication changes, allergies to medications, adverse drug reactions, diagnosis change, or new procedure performed?: [x] No    [] Yes (see summary sheet for update)  Subjective functional status/changes:   [] No changes reported  Patient reports practicing picking up items from floor at home     OBJECTIVE    20 min Therapeutic Exercise:  [] See flow sheet :   Rationale: increase ROM and increase strength to improve the patients ability to , pinch, lift, push     BUE- Shrugs/Retraction  BUE Floor Reach  PROM Wrist Flexion/Extension  Self ROM Education/Demonstration: Supination, Wrist Extension     25 min Therapeutic Activity:  []  See flow sheet :   Rationale: increase ROM, increase strength, and improve coordination  to improve the patients ability to perfrom functional tasks at home    Floor Item    Review of using NMES with active hold with wrist extension    1 in wooden dowel - from various heights from floor, varying lengths of dowel   1 in wooden block- 18x   BUE- Pushing with modified  to simulated pushing           With   [] TE   [] TA   [] neuro   [] other: Patient Education: [x] Review HEP    [] Progressed/Changed HEP based on:   [] positioning   [] body mechanics   [] transfers   [] heat/ice application   [] Splint wear/care   [] Sensory re-education   [] scar management      [] other:            Other Objective/Functional Measures:     Improved ability to retreive items from floor using Right UE  Fatigue with Right UE at end of session  Able to  keep BUE on simulated mower without assist      Pain Level (0-10 scale) post treatment: 0/10    ASSESSMENT/Changes in Function: funcitonal use improving     Patient will continue to benefit from skilled OT services to modify and progress therapeutic interventions, address ROM deficits, address strength deficits, and instruct in home and community integration to attain remaining goals. []  See Plan of Care  []  See progress note/recertification  []  See Discharge Summary         Progress towards goals / Updated goals:  Patient will be able to use right UE as an assist for 50% of the time or greater during self-care and IADL tasks. Status at PN : 30-35%, Progressing      2. Patient will be independent in demonstrating and performing activity modification strategies to aid in success for ADL/IADL tasks. Status at PN:  Progressing, using adaptive toothbrush gamble with Left UE assist      3. Patient will be able to pinch to retrieve small objects with the right hand in order to maintain fine motor skills for ADLs/IADLs. Status at PN :Progressing, goal continued for increased carryover and performance      4. Patient will report improved ability to perform house hold chores such as dusting and vacuuming while functional assist from Right UE. 5. Patient will demonstrate ability to  5 items from floor in clinic with Right UE due to improved gross motor control and improved pinch.         PLAN  []  Upgrade activities as tolerated     [x]  Continue plan of care  []  Update interventions per flow sheet       []  Discharge due to:_  []  Other:_      JUSTIN Mcdowell/L  11/29/2022  9:55 AM        Future Appointments   Date Time Provider Marc Ruvalcaba   12/2/2022  8:15 AM Chelita Sifuentes [id number] [facility]   12/7/2022 11:30 AM Chelita LANZA [facility]   12/8/2022  8:00 AM Virginia Orellana MD [facility]   12/9/2022 12:30 PM Gerry Anton OT MMCPTPB [facility]   12/13/2022 10:00 AM Mauro Bernard ZPWLCOR [facility]   12/16/2022  8:00 AM Mauro Bernard ACSJUIU [facility]   12/20/2022  9:30 AM Diya Lawson OT MMCPTPB [facility]   12/23/2022  8:00 AM Mauro Bernard IHBKWIC [facility]   9/5/2023  9:50 AM [facility] NURSE NOAM LEAVITT   9/12/2023 10:00 AM PRANAY Cristina

## 2022-11-30 ENCOUNTER — APPOINTMENT (OUTPATIENT)
Dept: PHYSICAL THERAPY | Age: 67
End: 2022-11-30
Payer: MEDICARE

## 2022-12-02 ENCOUNTER — HOSPITAL ENCOUNTER (OUTPATIENT)
Dept: PHYSICAL THERAPY | Age: 67
Discharge: HOME | End: 2022-12-02
Payer: MEDICARE

## 2022-12-02 PROCEDURE — 97530 THERAPEUTIC ACTIVITIES: CPT

## 2022-12-02 PROCEDURE — 97110 THERAPEUTIC EXERCISES: CPT

## 2022-12-02 PROCEDURE — 97535 SELF CARE MNGMENT TRAINING: CPT

## 2022-12-02 NOTE — PROGRESS NOTES
OT DAILY TREATMENT NOTE 10-18    Patient Name: Maciej Whitfield  Date:2022  : 1955  [x]  Patient  Verified  Payor: VA MEDICARE / Plan: VA MEDICARE PART A & B / Product Type: Medicare /    In time:815  Out time:900  Total Treatment Time (min): 39  Visit #: 2 of 8    Medicare/BCBS Only   Total Timed Codes (min):  45 1:1 Treatment Time:  45     Treatment Area: Hemiplegia and hemiparesis following cerebral infarction affecting right dominant side [I69.351]    SUBJECTIVE  Pain Level (0-10 scale): 0/10  Any medication changes, allergies to medications, adverse drug reactions, diagnosis change, or new procedure performed?: [x] No    [] Yes (see summary sheet for update)  Subjective functional status/changes:   [] No changes reported  Pt reporting he can see improvement with Right hand     OBJECTIVE    10 min Therapeutic Exercise:  [] See flow sheet :   Rationale: increase ROM and increase strength to improve the patients ability to pinch    Shrugs/Retraction   BUE floor reach   PROM Wrist flexion/extension    25 min Therapeutic Activity:  []  See flow sheet :   Rationale: increase ROM and improve coordination  to improve the patients ability to  items     Floor Item   Review of NMES use   Varied item - in stand- table level with use of dycem to stabilize platter    10 min Self Care/Home Management: clothing management    Rationale: increase ROM, increase strength, and improve coordination  to improve the patients ability to perform home and self care tasks with increased independence and ease     Various clothing: Folding- in Stand- table level- using Right hand to reach and pinch with left UE assist for gross motor         With   [] TE   [] TA   [] neuro   [] other: Patient Education: [x] Review HEP    [] Progressed/Changed HEP based on:   [] positioning   [] body mechanics   [] transfers   [] heat/ice application   [] Splint wear/care   [] Sensory re-education   [] scar management [] other:            Other Objective/Functional Measures:     Decreased drops with item         Pain Level (0-10 scale) post treatment: 0/10    ASSESSMENT/Changes in Function: funcitonal and volitional use of Right UE improving     Patient will continue to benefit from skilled OT services to modify and progress therapeutic interventions, address ROM deficits, address strength deficits, and instruct in home and community integration to attain remaining goals. []  See Plan of Care  []  See progress note/recertification  []  See Discharge Summary         Progress towards goals / Updated goals:  Patient will be able to use right UE as an assist for 50% of the time or greater during self-care and IADL tasks. Status at PN : 30-35%, Progressing      2. Patient will be independent in demonstrating and performing activity modification strategies to aid in success for ADL/IADL tasks. Status at PN:  Progressing, using adaptive toothbrush gamble with Left UE assist      3. Patient will be able to pinch to retrieve small objects with the right hand in order to maintain fine motor skills for ADLs/IADLs. Status at PN :Progressing, goal continued for increased carryover and performance      4. Patient will report improved ability to perform house hold chores such as dusting and vacuuming while functional assist from Right UE. 5. Patient will demonstrate ability to  5 items from floor in clinic with Right UE due to improved gross motor control and improved pinch.    Current Status (12/2/2022): Goal Met       PLAN  []  Upgrade activities as tolerated     [x]  Continue plan of care  []  Update interventions per flow sheet       []  Discharge due to:_  []  Other:_      DARELL Chris  12/2/2022  8:21 AM        Future Appointments   Date Time Provider Marc Ruvalcaba   12/7/2022 11:30 AM Keena JHAVERIHLSVA [facility]   12/8/2022  8:00 AM Alison Montenegro MD [facility]   12/9/2022 12:30 PM Citlaly Spencer, OT MMCPTPB [facility]   12/13/2022 10:00 AM Shabanabetsy Gallego [id number] [facility]   12/16/2022  8:00 AM Shabana Lashonda [id number] [facility]   12/20/2022  9:30 AM Colton Borjas, OT MMCPTPB [facility]   12/23/2022  8:00 AM Shabana Gallego [id number] [facility]   9/5/2023  9:50 AM [facility] NURSE NOAM LEAVITT   9/12/2023 10:00 AM PRANAY Espinoza

## 2022-12-05 ENCOUNTER — APPOINTMENT (OUTPATIENT)
Dept: PHYSICAL THERAPY | Age: 67
End: 2022-12-05
Payer: MEDICARE

## 2022-12-07 ENCOUNTER — HOSPITAL ENCOUNTER (OUTPATIENT)
Dept: PHYSICAL THERAPY | Age: 67
Discharge: HOME | End: 2022-12-07
Payer: MEDICARE

## 2022-12-07 PROCEDURE — 97110 THERAPEUTIC EXERCISES: CPT

## 2022-12-07 PROCEDURE — 97530 THERAPEUTIC ACTIVITIES: CPT

## 2022-12-07 NOTE — PROGRESS NOTES
OT DAILY TREATMENT NOTE 10-18    Patient Name: Marjorie Sauceda  Date:2022  : 1955  [x]  Patient  Verified  Payor: [place] MEDICARE / Plan: VA MEDICARE PART A & B / Product Type: Medicare /    In time:1130  Out time:1200  Total Treatment Time (min): 30  Visit #: 3 of 8    Medicare/BCBS Only   Total Timed Codes (min):  30 1:1 Treatment Time:  30     Treatment Area: Hemiplegia and hemiparesis following cerebral infarction affecting right dominant side [I69.351]    SUBJECTIVE  Pain Level (0-10 scale): 0/10  Any medication changes, allergies to medications, adverse drug reactions, diagnosis change, or new procedure performed?: [x] No    [] Yes (see summary sheet for update)  Subjective functional status/changes:   [] No changes reported  Pt reports hitting Right UE into counter in kitchen when asked about bandage on Right Wrist     OBJECTIVE    15 min Therapeutic Exercise:  [] See flow sheet :   Rationale: increase ROM and increase strength to improve the patients ability to reach     BUE Floor Reach   Shrugs/Retraction   BUE Shoulder Flexion with Cane- 2x10- focus on lift and release with control     15 min Therapeutic Activity:  []  See flow sheet :   Rationale: increase ROM and improve coordination  to improve the patients ability to  and release items    Floor item - socks- with Right UE- Seated  10x   1 in peg removal- in stand with Right UE- from resistive peg board 20x   Stress Ball floor            With   [] TE   [] TA   [] neuro   [] other: Patient Education: [x] Review HEP    [] Progressed/Changed HEP based on:   [] positioning   [] body mechanics   [] transfers   [] heat/ice application   [] Splint wear/care   [] Sensory re-education   [] scar management      [] other:            Other Objective/Functional Measures:     Able to  10 pairs of socks from floor with Right- unable to perform 3 weeks ago      1 in pegs: 3/20 drops = 15% drop rate     Pain Level (0-10 scale) post treatment: 0/10    ASSESSMENT/Changes in Function: grasp and release improving     Patient will continue to benefit from skilled OT services to modify and progress therapeutic interventions, address ROM deficits, address strength deficits, and instruct in home and community integration to attain remaining goals. []  See Plan of Care  []  See progress note/recertification  []  See Discharge Summary         Progress towards goals / Updated goals:  Patient will be able to use right UE as an assist for 50% of the time or greater during self-care and IADL tasks. Status at PN : 30-35%, Progressing      2. Patient will be independent in demonstrating and performing activity modification strategies to aid in success for ADL/IADL tasks. Status at PN:  Progressing, using adaptive toothbrush gamble with Left UE assist      3. Patient will be able to pinch to retrieve small objects with the right hand in order to maintain fine motor skills for ADLs/IADLs. Status at PN :Progressing, goal continued for increased carryover and performance      4. Patient will report improved ability to perform house hold chores such as dusting and vacuuming while functional assist from Right UE. 5. Patient will demonstrate ability to  5 items from floor in clinic with Right UE due to improved gross motor control and improved pinch.    Current Status (12/2/2022): Goal Met    PLAN  []  Upgrade activities as tolerated     [x]  Continue plan of care  []  Update interventions per flow sheet       []  Discharge due to:_  []  Other:_      DARELL Chris  12/7/2022  11:20 AM        Future Appointments   Date Time Provider Marc Ruvalcaba   12/7/2022 11:30 AM Keena Sam [id number] [facility]   12/8/2022  8:00 AM Alison Montenegro MD [facility]   12/9/2022  8:00 AM Randol Flaco RPABJTH [facility]   12/13/2022 10:00 AM Randjacqueline Sam [id number] [facility]   12/16/2022  8:00 AM Randol Flaco [id number] [facility]   12/20/2022  9:30 AM Pepper Rodriguez, OT [id number] [facility]   12/23/2022  8:00 AM Narcisa TORRES [facility]   9/5/2023  9:50 AM [facility] N[facility] NOAM FIORE [facility]   9/12/2023 10:00 AM PRANAY Paul [address] [facility]

## 2022-12-08 ENCOUNTER — OFFICE VISIT (OUTPATIENT)
Dept: NEUROLOGY | Age: 67
End: 2022-12-08
Payer: MEDICARE

## 2022-12-08 VITALS
BODY MASS INDEX: 33.09 KG/M2 | SYSTOLIC BLOOD PRESSURE: 136 MMHG | RESPIRATION RATE: 20 BRPM | WEIGHT: 223.4 LBS | OXYGEN SATURATION: 98 % | DIASTOLIC BLOOD PRESSURE: 76 MMHG | HEIGHT: 69 IN | HEART RATE: 66 BPM

## 2022-12-08 DIAGNOSIS — I63.9 INFARCTION OF LEFT BASAL GANGLIA (HCC): ICD-10-CM

## 2022-12-08 DIAGNOSIS — G81.91 RIGHT HEMIPARESIS (HCC): Primary | ICD-10-CM

## 2022-12-08 PROCEDURE — G0463 HOSPITAL OUTPT CLINIC VISIT: HCPCS | Performed by: STUDENT IN AN ORGANIZED HEALTH CARE EDUCATION/TRAINING PROGRAM

## 2022-12-08 NOTE — PROGRESS NOTES
Jasper Stevenson is a 75 y.o. male . presents for Follow-up and Stroke  A 75years old male patient with left posterior basal ganglia and corona radiata stroke in August 2019 with residual  right-sided hemiparesis here for follow-up. Last seen in the clinic in June 2022. Parasal weakness is about the same. Able to slightly lift his right upper extremity at the shoulder. Had no significant improvement. Some stiffness over the right upper extremity. Able to walk with a cane. No recent falls. He is currently driving and no significant problems according to him. He is on aspirin 81 mg p.o. daily. Stroke  Pertinent negatives include no chest pain, no headaches and no shortness of breath. Follow-up  Pertinent negatives include no chest pain, no headaches and no shortness of breath. Neurologic Problem  Primary symptoms include focal weakness (righgt hemiparesis). Pertinent negatives include no shortness of breath, no chest pain, no vomiting, no headaches and no nausea. Review of Systems   Constitutional:  Negative for chills, fever and weight loss. HENT:  Negative for hearing loss and tinnitus. Eyes:  Negative for blurred vision and double vision. Respiratory:  Positive for cough. Negative for hemoptysis, sputum production and shortness of breath. Cardiovascular:  Negative for chest pain and leg swelling. Gastrointestinal:  Negative for nausea and vomiting. Genitourinary:  Negative for dysuria, frequency and urgency. Musculoskeletal:  Negative for back pain and neck pain. Skin:  Negative for itching and rash. Neurological:  Positive for focal weakness (righgt hemiparesis). Negative for dizziness, tingling, tremors, speech change (mild slurring) and headaches. Endo/Heme/Allergies:  Bruises/bleeds easily.      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     On Allopurinol    Chronic hypokalemia     Persistent chronic hypokalemia + hypertension; ?primary hyperaldosteronism    CKD (chronic kidney disease) stage 2, GFR 60-89 ml/min 8/15/2019    Current use of aspirin 8/14/2019    Dysarthria 8/12/2019    Dysphagia 8/12/2019    Elevated prostate specific antigen (PSA) 7/21/2011    First degree atrioventricular block by electrocardiogram 8/12/2019    Hypertensive heart and kidney disease without heart failure and with stage 2 chronic kidney disease     Iron deficiency anemia 8/14/2019    Anemia work-up (8/14/2019) showed serum iron 22, TIBC 371, iron % saturation 6, ferritin 34, reticulocyte count 1.4     Obesity, Class I, BMI 30-34.9     Obstructive sleep apnea on CPAP     On statin therapy due to risk of future cardiovascular event 8/14/2019    On Atorvastatin    Primary osteoarthritis of right ankle 8/22/2019    X-ray of the right ankle (8/22/2019) showed no acute fracture or subluxation; advanced degenerative changes at the tibiotalar joint; old fracture fragment vs. accessory ossicle in the inferior aspect of the lateral malleolus; soft tissue swelling around the ankle. Pure hypercholesterolemia 08/15/2019    Lipid profile (8/13/2019) showed , , HDL 42, ; Lipid profile (8/14/2019) showed , , HDL 39, LDL 94    Received intravenous tissue plasminogen activator (tPA) in emergency department 8/12/2019    Refusal of statin medication by patient 8/13/2019    As per note of Neurology (Dr. Nicholas Ascencio), patient refuses statin agent because of potential side effects.      Right hemiparesis ([facility] 8/12/2019    Secondary hyperparathyroidism of renal origin ([facility] 8/18/2019    PTH (8/18/2019) = 101.7    Type 2 diabetes mellitus with stage 2 chronic kidney disease (HCC)     HbA1c (8/13/2019) = 6.6 no meds    Vitamin B12 deficiency anemia 8/14/2019    Vitamin B12 (8/14/2019) = 208    Vitamin D deficiency 8/19/2019    Vitamin D 25-Hydroxy (8/19/2019) = 16.2        Past Surgical History:   Procedure Laterality Date    COLONOSCOPY N/A 4/15/2019    COLONOSCOPY performed by Loretta Huynh MD at [facility] ENDOSCOPY    COLONOSCOPY N/A 12/20/2021    COLONOSCOPY with polypectomies performed by Loretta Huynh MD at [facility] ENDOSCOPY    HX TONSILLECTOMY          Family History   Problem Relation Age of Onset    Cancer Mother         Gastric cancer    Heart Disease Father     Prostate Cancer Father         Social History     Socioeconomic History    Marital status:      Spouse name: Not on file    Number of children: Not on file    Years of education: Not on file    Highest education level: Not on file   Occupational History    Not on file   Tobacco Use    Smoking status: Former    Smokeless tobacco: Never   Substance and Sexual Activity    Alcohol use: No     Alcohol/week: 0.0 standard drinks    Drug use: No    Sexual activity: Not on file   Other Topics Concern    Not on file   Social History Narrative    Not on file     Social Determinants of Health     Financial Resource Strain: Not on file   Food Insecurity: Not on file   Transportation Needs: Not on file   Physical Activity: Not on file   Stress: Not on file   Social Connections: Not on file   Intimate Partner Violence: Not on file   Housing Stability: Not on file        No Known Allergies      Current Outpatient Medications   Medication Sig Dispense Refill    finasteride (PROSCAR) 5 mg tablet Take 1 Tablet by mouth daily. 90 Tablet 3    MULTIVITAMIN PO Take 1 Tablet by mouth daily. tiZANidine (ZANAFLEX) 4 mg tablet TAKE 1 TABLET BY MOUTH EVERYDAY AT BEDTIME      spironolactone (ALDACTONE) 25 mg tablet TAKE 1 TABLET BY MOUTH TWICE A DAY      Klor-Con M20 20 mEq tablet TAKE 1 TABLET BY MOUTH ONCE A DAY. DO NOT CRUSH OR CHEW.      olmesartan (BENICAR) 40 mg tablet Take 40 mg by mouth daily. glucose blood VI test strips (ASCENSIA AUTODISC VI, ONE TOUCH ULTRA TEST VI) strip Use one strip with glucometer daily in AM before breakfast and PRN (E11.21)      hydroCHLOROthiazide (HYDRODIURIL) 25 mg tablet Take 25 mg by mouth.      lancets misc Prick finger with lancet once a day in AM before breakfast or PRN to monitor blood sugar (E11.21)      sertraline (ZOLOFT) 50 mg tablet       carvediloL (COREG) 12.5 mg tablet Take 12.5 mg by mouth. ferrous sulfate 325 mg (65 mg iron) tablet Take 1 Tab by mouth daily (with breakfast). 30 Tab 0    hydrALAZINE (APRESOLINE) 25 mg tablet Take 3 Tabs by mouth every eight (8) hours. Indications: high blood pressure (Patient taking differently: Take 50 mg by mouth every eight (8) hours. Takes 2 tabs TID  Indications: high blood pressure) 270 Tab 0    aspirin 81 mg chewable tablet Take 1 Tab by mouth daily (with breakfast). Indications: stroke prevention 30 Tab 0    amLODIPine (NORVASC) 10 mg tablet Take 10 mg by mouth daily. Physical Exam  Constitutional:       Appearance: Normal appearance. HENT:      Head: Atraumatic. Mouth/Throat:      Mouth: Mucous membranes are moist.      Pharynx: Oropharynx is clear. No oropharyngeal exudate. Eyes:      Extraocular Movements: Extraocular movements intact. Pupils: Pupils are equal, round, and reactive to light. Pulmonary:      Effort: Pulmonary effort is normal. No respiratory distress. Musculoskeletal:      Cervical back: Normal range of motion and neck supple. Comments: Right hemiparesis [upper extremity more than the lower extremity]   Neurological:      Mental Status: He is alert. Comments: Mental status: Awake, alert, oriented, follows simple and complex commands, no neglect, no extinction to DSS or VSS.   Speech and languge: fluent with no significant dysarthria, coherent,  and comprehension intact  CN: VFF, EOMI, PERRLA, face sensation intact , mild right lower facial asymmetry noted, palate elevation symmetric bilat, difficulty lifting the right shoulder up;  tongue midline  Motor: increased tone over the right upper and lower extremities; strength over the right hand is 2-3/5; elbow to 3/5 and shoulder abduction is 3 out of 5; right lower extremity hip flexion 4+/5; knee extension 4+/5; knee flexion 4 /5; ankle dorsiflexion and plantar flexion 4/5. Strength is over the left side is normal.    Coordination: FNF, HS accurate w/o dysmetria on the left side; unable to test on the left. DTR: 2+ on the  left side and 3+ on the right side. Gait: Hemiparetic. No visits with results within 3 Month(s) from this visit. Latest known visit with results is:   Clinical Support on 08/30/2022   Component Date Value Ref Range Status    Prostate Specific Ag 08/30/2022 1.59  0.00 - 4.00 ng/mL Final    Comment: (Methodology: Roche ECLIA)                  ICD-10-CM ICD-9-CM    1. Right hemiparesis ([facility])  G81.91 342.90       2. Infarction of left basal ganglia [facility])  I63.9 434.91         A 74-year-old male patient with history of left basal ganglia lacunar stroke in August 2019 here for follow-up evaluation. Continues to have the right hemiparesis. Independent in most ADLs. He is currently driving and DMV has cleared him and do not need any forms to be filled out anymore. Since he did not have any recurrence of the stroke since August 2019 and the symptoms are stable, he needs to follow closely with his primary care provider for risk factor modification. He will continue with physical therapy. Discussed stroke symptoms and the need to go to the emergency room if any recurrence. We will see him in this clinic as needed.

## 2022-12-09 ENCOUNTER — HOSPITAL ENCOUNTER (OUTPATIENT)
Dept: PHYSICAL THERAPY | Age: 67
Discharge: HOME | End: 2022-12-09
Payer: MEDICARE

## 2022-12-09 PROCEDURE — 97110 THERAPEUTIC EXERCISES: CPT

## 2022-12-09 PROCEDURE — 97530 THERAPEUTIC ACTIVITIES: CPT

## 2022-12-09 NOTE — PROGRESS NOTES
OT DAILY TREATMENT NOTE 10-18    Patient Name: Amanda Mccracken  Date:2022  : 1955  [x]  Patient  Verified  Payor: VA MEDICARE / Plan: VA MEDICARE PART A & B / Product Type: Medicare /    In time:800  Out time:830  Total Treatment Time (min): 30  Visit #: 4 of 8    Medicare/BCBS Only   Total Timed Codes (min):  30 1:1 Treatment Time:  30     Treatment Area: Hemiplegia and hemiparesis following cerebral infarction affecting right dominant side [I69.351]    SUBJECTIVE  Pain Level (0-10 scale): 0/10  Any medication changes, allergies to medications, adverse drug reactions, diagnosis change, or new procedure performed?: [x] No    [] Yes (see summary sheet for update)  Subjective functional status/changes:   [] No changes reported  Its hard to lace my fingers     OBJECTIVE    15 min Therapeutic Exercise:  [] See flow sheet :   Rationale: increase ROM and increase strength to improve the patients ability to reach     BUE Floor Reach   Shrugs/Retraction   BUE Shoulder Flexion with Cane- 2x10- focus on lift and release with control   Self ROM- Wrist extension with fingers laced     15 min Therapeutic Activity:  []  See flow sheet :   Rationale: increase ROM and improve coordination  to improve the patients ability to  items     Floor item - socks- with Right UE- Seated  2 x10   1 in peg removal- in stand with Right UE- from resistive peg board 20x       With   [] TE   [] TA   [] neuro   [] other: Patient Education: [x] Review HEP    [] Progressed/Changed HEP based on:   [] positioning   [] body mechanics   [] transfers   [] heat/ice application   [] Splint wear/care   [] Sensory re-education   [] scar management      [] other:            Other Objective/Functional Measures:     Difficulty with lacing fingers due to tone        Pain Level (0-10 scale) post treatment: 0/10    ASSESSMENT/Changes in Function: Functional use of Right UE improving, improved control of right thumb.  Ongoing cueing required to take breaks when tone increases. Plan for discharge at end of POC. Patient will continue to benefit from skilled OT services to modify and progress therapeutic interventions, address ROM deficits, address strength deficits, and instruct in home and community integration to attain remaining goals. []  See Plan of Care  []  See progress note/recertification  []  See Discharge Summary         Progress towards goals / Updated goals:  Patient will be able to use right UE as an assist for 50% of the time or greater during self-care and IADL tasks. Status at PN : 30-35%, Progressing   Current Status (12/9/2022): Progressing     2. Patient will be independent in demonstrating and performing activity modification strategies to aid in success for ADL/IADL tasks. Status at PN:  Progressing, using adaptive toothbrush gamble with Left UE assist      3. Patient will be able to pinch to retrieve small objects with the right hand in order to maintain fine motor skills for ADLs/IADLs. Status at PN :Progressing, goal continued for increased carryover and performance    Current Status (12/9/2022): Goal Met    4. Patient will report improved ability to perform house hold chores such as dusting and vacuuming while functional assist from Right UE. 5. Patient will demonstrate ability to  5 items from floor in clinic with Right UE due to improved gross motor control and improved pinch.    Current Status (12/2/2022): Goal Met       PLAN  []  Upgrade activities as tolerated     [x]  Continue plan of care  []  Update interventions per flow sheet       []  Discharge due to:_  []  Other:_      DARELL So  12/9/2022  9:07 AM        Future Appointments   Date Time Provider Marc Ruvalcaba   12/13/2022 10:00 AM Negrito Snow [id number] [facility]   12/16/2022  8:00 AM Negrito Snow [id number] [facility]   12/20/2022  9:30 AM Jordyn Isidro OT [id number] [facility]   12/23/2022  8:00 AM Negrito Snow [id number] [facility]   9/5/2023  9:50 AM [facility] ADEBAYO LEAVITT   9/12/2023 10:00 AM PRANAY Hamilton [facility]

## 2022-12-13 ENCOUNTER — HOSPITAL ENCOUNTER (OUTPATIENT)
Dept: PHYSICAL THERAPY | Age: 67
Discharge: HOME | End: 2022-12-13
Payer: MEDICARE

## 2022-12-13 PROCEDURE — 97110 THERAPEUTIC EXERCISES: CPT

## 2022-12-13 PROCEDURE — 97530 THERAPEUTIC ACTIVITIES: CPT

## 2022-12-13 NOTE — PROGRESS NOTES
OT DAILY TREATMENT NOTE 10-18    Patient Name: Fuentes Dimas  Date:2022  : 1955  [x]  Patient  Verified  Payor: VA MEDICARE / Plan: VA MEDICARE PART A & B / Product Type: Medicare /    In time:1005  Out time:1043  Total Treatment Time (min): 38  Visit #: 5 of 8    Medicare/BCBS Only   Total Timed Codes (min):  38 1:1 Treatment Time:  38     Treatment Area: Hemiplegia and hemiparesis following cerebral infarction affecting right dominant side [I69.351]    SUBJECTIVE  Pain Level (0-10 scale): 0/10  Any medication changes, allergies to medications, adverse drug reactions, diagnosis change, or new procedure performed?: [x] No    [] Yes (see summary sheet for update)  Subjective functional status/changes:   [] No changes reported  I have been practicing at home but with nothing that small     OBJECTIVE    14 min Therapeutic Exercise:  [] See flow sheet :   Rationale: increase ROM and increase strength to improve the patients ability to reach     BUE Floor Reach   Shrugs/Retraction   Cane BUE flexion with hold   Wrist ROM- PROM    24 min Therapeutic Activity:  []  See flow sheet :   Rationale: increase ROM and improve coordination  to improve the patients ability to  items     Stress ball  from floor using Right UE  Foam Puzzle - in stand 6 pieces- varying shapes then weightbearing to push into place   Perfection: in stand- remove only- timed         With   [] TE   [] TA   [] neuro   [] other: Patient Education: [x] Review HEP    [] Progressed/Changed HEP based on:   [] positioning   [] body mechanics   [] transfers   [] heat/ice application   [] Splint wear/care   [] Sensory re-education   [] scar management      [] other:            Other Objective/Functional Measures:     Perfection: 4:54   9 hole- remove only- 2:21     Pain Level (0-10 scale) post treatment: 0/10    ASSESSMENT/Changes in Function: improved volitional movement of thumb noted    Patient will continue to benefit from skilled OT services to modify and progress therapeutic interventions, address ROM deficits, address strength deficits, and instruct in home and community integration to attain remaining goals. []  See Plan of Care  []  See progress note/recertification  []  See Discharge Summary         Progress towards goals / Updated goals:  Patient will be able to use right UE as an assist for 50% of the time or greater during self-care and IADL tasks. Status at PN : 30-35%, Progressing   Current Status (12/9/2022): Progressing     2. Patient will be independent in demonstrating and performing activity modification strategies to aid in success for ADL/IADL tasks. Status at PN:  Progressing, using adaptive toothbrush gamble with Left UE assist      3. Patient will be able to pinch to retrieve small objects with the right hand in order to maintain fine motor skills for ADLs/IADLs. Status at PN :Progressing, goal continued for increased carryover and performance    Current Status (12/9/2022): Goal Met     4. Patient will report improved ability to perform house hold chores such as dusting and vacuuming while functional assist from Right UE. 5. Patient will demonstrate ability to  5 items from floor in clinic with Right UE due to improved gross motor control and improved pinch.    Current Status (12/2/2022): Goal Met       PLAN  []  Upgrade activities as tolerated     [x]  Continue plan of care  []  Update interventions per flow sheet       []  Discharge due to:_  []  Other:_      LaraJUSTIN Iglesias/L  12/13/2022  10:08 AM        Future Appointments   Date Time Provider Marc Ruvalcaba   12/16/2022  8:00 AM Laila Pereira [id number] [facility]   12/20/2022  9:30 AM Hansa Pollard OT [id number] [facility]   12/23/2022  8:00 AM Laila Pereira YMILCNS [facility]   9/5/2023  9:50 [facility]AM [facility]   9/12/2023 10:00 AM PRANAY Lopez

## 2022-12-16 ENCOUNTER — HOSPITAL ENCOUNTER (OUTPATIENT)
Dept: PHYSICAL THERAPY | Age: 67
Discharge: HOME | End: 2022-12-16
Payer: MEDICARE

## 2022-12-16 PROCEDURE — 97110 THERAPEUTIC EXERCISES: CPT

## 2022-12-16 PROCEDURE — 97530 THERAPEUTIC ACTIVITIES: CPT

## 2022-12-16 NOTE — PROGRESS NOTES
OT DAILY TREATMENT NOTE 10-18    Patient Name: Josephine Ortiz  Date:2022  : 1955  [x]  Patient  Verified  Payor: VA MEDICARE / Plan: VA MEDICARE PART A & B / Product Type: Medicare /    In time:800  Out time:840  Total Treatment Time (min): 40  Visit #: 6 of 8    Medicare/BCBS Only   Total Timed Codes (min):  40 1:1 Treatment Time:  40     Treatment Area: Hemiplegia and hemiparesis following cerebral infarction affecting right dominant side [I69.351]    SUBJECTIVE  Pain Level (0-10 scale): 0/10  Any medication changes, allergies to medications, adverse drug reactions, diagnosis change, or new procedure performed?: [x] No    [] Yes (see summary sheet for update)  Subjective functional status/changes:   [] No changes reported  I am practiving holding my wrist up with the NMES like you said, its really hard and its not working right now but i'm  trying.      OBJECTIVE    15 min Therapeutic Exercise:  [] See flow sheet :   Rationale: increase ROM and increase strength to improve the patients ability to move Right UE/Hand     BUE Floor Reach   Shrugs/Retraction   Cane BUE flexion with hold   Wrist ROM- PROM    25 min Therapeutic Activity:  []  See flow sheet :   Rationale: increase ROM and improve coordination  to improve the patients ability to  items, move thumb     Foam Puzzle - in stand 6 pieces- varying shapes then weightbearing to push into place   Perfection: in stand- remove only-   Velcro Chips: in stand- remove only- 7x       With   [] TE   [] TA   [] neuro   [] other: Patient Education: [x] Review HEP    [] Progressed/Changed HEP based on:   [] positioning   [] body mechanics   [] transfers   [] heat/ice application   [] Splint wear/care   [] Sensory re-education   [] scar management      [] other:            Other Objective/Functional Measures:     Increased difficulty with perfection pieces removal      Pain Level (0-10 scale) post treatment: 0/10    ASSESSMENT/Changes in Function: Volitional thumb movement improving, plan to extend POC at next progress note     Patient will continue to benefit from skilled OT services to modify and progress therapeutic interventions, address ROM deficits, address strength deficits, and instruct in home and community integration to attain remaining goals. []  See Plan of Care  []  See progress note/recertification  []  See Discharge Summary         Progress towards goals / Updated goals:  Patient will be able to use right UE as an assist for 50% of the time or greater during self-care and IADL tasks. Status at PN : 30-35%, Progressing   Current Status (12/9/2022): Progressing     2. Patient will be independent in demonstrating and performing activity modification strategies to aid in success for ADL/IADL tasks. Status at PN:  Progressing, using adaptive toothbrush gamble with Left UE assist      3. Patient will be able to pinch to retrieve small objects with the right hand in order to maintain fine motor skills for ADLs/IADLs. Status at PN :Progressing, goal continued for increased carryover and performance    Current Status (12/9/2022): Goal Met     4. Patient will report improved ability to perform house hold chores such as dusting and vacuuming while functional assist from Right UE. 5. Patient will demonstrate ability to  5 items from floor in clinic with Right UE due to improved gross motor control and improved pinch.    Current Status (12/2/2022): Goal Met    PLAN  []  Upgrade activities as tolerated     [x]  Continue plan of care  []  Update interventions per flow sheet       []  Discharge due to:_  []  Other:_      Carmen Fraction ,ANDERSON/L  12/16/2022  8:11 AM        Future Appointments   Date Time Provider Marc Ruvalcaba   12/20/2022  9:30 AM Nitin Cortez OT [id number] [facility]   12/23/2022  8:00 AM Fausto Norwood MCQCOQN [facility]   9/5/2023  9:50 AM [facility] NURSE NOAM LEAVITT   9/12/2023 10:00 AM PRANAY Boyce [facility] [address]

## 2022-12-20 ENCOUNTER — HOSPITAL ENCOUNTER (OUTPATIENT)
Dept: PHYSICAL THERAPY | Age: 67
Discharge: HOME | End: 2022-12-20
Payer: MEDICARE

## 2022-12-20 PROCEDURE — 97110 THERAPEUTIC EXERCISES: CPT

## 2022-12-20 PROCEDURE — 97530 THERAPEUTIC ACTIVITIES: CPT

## 2022-12-20 NOTE — PROGRESS NOTES
[facility]  [address] [place] [address]  [phone] fax    Continued Plan of Care/ Re-certification for Occupational Therapy Services    Patient name: Massiel Esteves Start of Care: 10/14/2022   Referral source: Rita Montero NP : 1955   Medical/Treatment Diagnosis: Hemiplegia and hemiparesis following cerebral infarction affecting right dominant side [I69.351]  Payor: VA MEDICARE / Plan: VA MEDICARE PART A & B / Product Type: Medicare /  Onset Date:2019     Prior Hospitalization: see medical history Provider#: [id number]   Medications: Verified on Patient Summary List     Comorbidities: Hx of CVA, Arthritis, High blood pressure    Prior Level of Function: Independent in ADLs, yard work, watching football, travel, car washing     Visits from [facility]: 15    Missed Visits: 0    The Plan of Care and following information is based on the patient's current status:  Patient will be able to use right UE as an assist for 50% of the time or greater during self-care and IADL tasks. Status at PN : 30-35%, Progressing   Current Status (2022): Progressing  Status at recert 5196 - 07-09%, no change since last recert     2. Patient will be independent in demonstrating and performing activity modification strategies to aid in success for ADL/IADL tasks. Status at PN:  Progressing, using adaptive toothbrush gamble with Left UE assist   Status at recert  - pt using adaptive toothbrush gamble, no other aids at this time     3. Patient will be able to pinch to retrieve small objects with the right hand in order to maintain fine motor skills for ADLs/IADLs. Status at PN :Progressing, goal continued for increased carryover and performance    Current Status (2022): Goal Met     4. Patient will report improved ability to perform house hold chores such as dusting and vacuuming while functional assist from Right UE.    [facility] pt reports [place] assist to perform push mowing, dusting and vacuuming tasks, goal met     5. Patient will demonstrate ability to  5 items from floor in clinic with Right UE due to improved gross motor control and improved pinch. Current Status (12/2/2022): Goal Met    Key functional changes: increasing functional use of right UE for daily tasks, improved ability to perform lateral pinch and grasp      Problems/ barriers to goal attainment: physical     Treatment Plan: Therapeutic exercise, Therapeutic activities, Neuromuscular re-education, Manual therapy, Patient education, and ADLs/IADLs      Patient Goal (s) has been updated and includes: Oswaldo Lebronlon my hand over more     Goals for this certification period to be accomplished in 4 weeks:  Patient will be able to use right UE as an assist for 50% of the time or greater during self-care and IADL tasks. Status at [facility] 68/10/5932 - 06-54%     2. Patient will be independent in demonstrating and performing activity modification strategies to aid in success for ADL/IADL tasks. Status at [facility] 68/88/3070 - pt using adaptive toothbrush gamble, no other aids at this time    3. Patient will have 0 degrees of right forearm supination in order to perform ADL's including washing face and accepting change. Status at Eval: 38 deg from neutral, instructed in supination PROM/AROM stretches    4. Goal:* Pt will have 15 pounds of  in the right hand to allow for functional grasp for all ADL activities including dressing, bathing and self care. Status at [facility] 68/60/1490 - 76#    5. Patient will have improved right lateral pinch strength of  5 pounds in order to perform fine motor tasks such as turning pages, turning ignition and open containers. Status at [facility] 72/86/6493 - 3#    Frequency / Duration: Patient to be seen 2 times per week for 4 weeks:    Assessment / Recommendations: Pt demonstrates improved ability to perform lateral pinch. Assessed right UE hand  and lateral pinch strength. Pt reports increasing use of right UE for turning light switches on and off and opening doors. Pt demonstrated ability to remove pegs from pegboard, regressed from 12/13/2022. He reports improved [place] assist to complete household tasks, and retrieving items from the floor. Patient will continue to benefit from skilled OT services to modify and progress therapeutic interventions, address ROM deficits, address strength deficits, analyze and address soft tissue restrictions, analyze and cue movement patterns, analyze and modify body mechanics/ergonomics, and instruct in home and community integration to attain remaining goals. Certification Period: 12/20/2022 - 1/18/2022    Shaw Villareal OT 12/20/2022 12:34 PM    ________________________________________________________________________  I certify that the above Therapy Services are being furnished while the patient is under my care. I agree with the treatment plan and certify that this therapy is necessary.       Physician's Signature:____________Date:_________TIME:________     Sharad Hernandez NP  ** Signature, Date and Time must be completed for valid certification **      Please sign and return to [facility]  [address] [place] [address]            [phone] fax

## 2022-12-23 ENCOUNTER — APPOINTMENT (OUTPATIENT)
Dept: PHYSICAL THERAPY | Age: 67
End: 2022-12-23
Payer: MEDICARE

## 2022-12-27 ENCOUNTER — HOSPITAL ENCOUNTER (OUTPATIENT)
Dept: PHYSICAL THERAPY | Age: 67
Discharge: HOME | End: 2022-12-27
Payer: MEDICARE

## 2022-12-27 PROCEDURE — 97110 THERAPEUTIC EXERCISES: CPT

## 2022-12-27 PROCEDURE — 97530 THERAPEUTIC ACTIVITIES: CPT

## 2022-12-27 NOTE — PROGRESS NOTES
OT DAILY TREATMENT NOTE 10-18    Patient Name: Jose F Robertson  Date:2022  : 1955  [x]  Patient  Verified  Payor: VA MEDICARE / Plan: VA MEDICARE PART A & B / Product Type: Medicare /    In time:1000  Out time:103  Total Treatment Time (min): 31  Visit #: 1 of 8    Medicare/BCBS Only   Total Timed Codes (min):  31 1:1 Treatment Time:  31     Treatment Area: Hemiplegia and hemiparesis following cerebral infarction affecting right dominant side [I69.351]    SUBJECTIVE  Pain Level (0-10 scale): 0/10  Any medication changes, allergies to medications, adverse drug reactions, diagnosis change, or new procedure performed?: [x] No    [] Yes (see summary sheet for update)  Subjective functional status/changes:   [] No changes reported  Pt reporting ongoing difficulty with holding wrist in extension during home NMES     OBJECTIVE    11 min Therapeutic Exercise:  [] See flow sheet :   Rationale: increase ROM and increase strength to improve the patients ability to reach      B Shrugs/Retraction   B Floor Reach   B Cane Shoulder Flexion with hold   PROM Wrist ROM    20 min Therapeutic Activity:  []  See flow sheet :   Rationale: increase ROM, increase strength, and improve coordination  to improve the patients ability to pick items up     Shoulder Arc- in Stand- Right- Large and small look   Sponge cube  - Right lateral pinch         With   [] TE   [] TA   [] neuro   [] other: Patient Education: [x] Review HEP    [] Progressed/Changed HEP based on:   [] positioning   [] body mechanics   [] transfers   [] heat/ice application   [] Splint wear/care   [] Sensory re-education   [] scar management      [] other:            Other Objective/Functional Measures:     Difficulty maintaining Right lateral pinch with Right Shoulder movement      Pain Level (0-10 scale) post treatment: 0/10    ASSESSMENT/Changes in Function: Volitional thumb control improving     Patient will continue to benefit from skilled OT services to modify and progress therapeutic interventions, address ROM deficits, address strength deficits, and instruct in home and community integration to attain remaining goals. []  See Plan of Care  []  See progress note/recertification  []  See Discharge Summary         Progress towards goals / Updated goals:  Patient will be able to use right UE as an assist for 50% of the time or greater during self-care and IADL tasks. Status at recert 30/79/9364 - 39-04%     2. Patient will be independent in demonstrating and performing activity modification strategies to aid in success for ADL/IADL tasks. Status at recert 69/16/3219 - pt using adaptive toothbrush gamble, no other aids at this time     3. Patient will have 0 degrees of right forearm supination in order to perform ADL's including washing face and accepting change. Status at Eval: 38 deg from neutral, instructed in supination PROM/AROM stretches     4. Goal:* Pt will have 15 pounds of  in the right hand to allow for functional grasp for all ADL activities including dressing, bathing and self care. Status at recert 93/04/0090 - 75#     5. Patient will have improved right lateral pinch strength of  5 pounds in order to perform fine motor tasks such as turning pages, turning ignition and open containers.   Status at recert 27/03/6492 - 3#    PLAN  []  Upgrade activities as tolerated     [x]  Continue plan of care  []  Update interventions per flow sheet       []  Discharge due to:_  []  Other:_      DARELL Easley  12/27/2022  10:04 AM        Future Appointments   Date Time Provider Marc Ruvalcaba   12/30/2022  9:00 AM Marleny Ayala OT MMCPTPB [facility]   1/3/2023  9:00 AM Katarzyna Obando EMEDTLA [facility]   1/6/2023  8:30 AM Katarzynaluzma Obando YJRLBYA [facility]   1/10/2023  9:30 AM Katarzynaluzma Obando VHOXXXZ [facility]   1/13/2023  8:30 AM Katarzyna Obando XTGJCJB [facility]   1/17/2023  9:00 AM Katarzyna Obando WAHFWRL BRONW[facility]   1/20/2023  8:30 AM Kristan Misael MALAVE [facility]   1/24/2023  9:00 AM Wen Maryanne SVSICSS [facility]   1/27/2023  8:30 AM Wen Nine VOMLBPG [facility]   1/31/2023  9:30 AM Wen Maryanne THU [facility]   9/5/2023  9:50 AM [facility] NURSE NOAM LEAVITT   9/12/2023 10:00 AM PRANAY Silverman

## 2022-12-30 ENCOUNTER — HOSPITAL ENCOUNTER (OUTPATIENT)
Dept: PHYSICAL THERAPY | Age: 67
End: 2022-12-30
Payer: MEDICARE

## 2022-12-30 PROCEDURE — 97110 THERAPEUTIC EXERCISES: CPT

## 2022-12-30 PROCEDURE — 97530 THERAPEUTIC ACTIVITIES: CPT

## 2022-12-30 NOTE — PROGRESS NOTES
OCCUPATIONAL THERAPY - DAILY TREATMENT NOTE    Patient Name: Jesus Kim        Date: 2022  : 1955   YES Patient  Verified  Visit #:   2   of   8  Insurance: Payor: Jarrett Lagunas / Plan: VA MEDICARE PART A & B / Product Type: Medicare /      In time: 9:00 Out time: 9:30   Total Treatment Time: 30     Medicare/BCBS Time Tracking (below)   Total Timed Codes (min):  30 1:1 Treatment Time:  30     TREATMENT AREA =  Right UE    SUBJECTIVE    Pain Level (on 0 to 10 scale):  0  / 10   Medication Changes/New allergies or changes in medical history, any new surgeries or procedures? NO    If yes, update Summary List   Subjective Functional Status/Changes:  []  No changes reported     \"I can  laundry from the floor at home. \"         OBJECTIVE      15 min Therapeutic Exercise:  [x]  See flow sheet   Rationale:      increase ROM and increase strength to improve the patients ability to reach, , and pinch for ADL participation.     -see flow sheet   -shoulder shrugs/retraction x15 each  -cane exercises shoulder flexion 2 sets of x15    -bilateral UE floor reach x10      15 min Therapeutic Activity:    Rationale:    increase ROM, increase strength, and improve coordination to improve the patients ability to , pinch, and manipulate small objects for ADL participation. -modified lateral pinch retrieve sponges from table top x2 sets   -perfection - remove only   -laundry  from floor        min Patient Education:  NO  Reviewed HEP   []  Progressed/Changed HEP based on:         Other Objective/Functional Measures:    -Mod difficulty modified lateral pinch to retreive sponges from table top   -Min cueing for stretching to decrease tone during fine motor activities      Post Treatment Pain Level (on 0 to 10) scale:   0  / 10     ASSESSMENT  Assessment/Changes in Function:     Progressing with fine control/coordination   Benefits from stretching between tx tasks to decrease hypertonicity      [] See Progress Note/Recertification   Patient will continue to benefit from skilled OT services to modify and progress therapeutic interventions, address ROM deficits, address strength deficits, and instruct in home and community integration to attain remaining goals. Progress toward goals / Updated goals:    Patient will be able to use right UE as an assist for 50% of the time or greater during self-care and IADL tasks. Status at recert 10/44/0841 - 93-03%     2. Patient will be independent in demonstrating and performing activity modification strategies to aid in success for ADL/IADL tasks. Status at recert 26/43/2206 - pt using adaptive toothbrush gamble, no other aids at this time     3. Patient will have 0 degrees of right forearm supination in order to perform ADL's including washing face and accepting change. Status at Eval: 38 deg from neutral, instructed in supination PROM/AROM stretches     4. Goal:* Pt will have 15 pounds of  in the right hand to allow for functional grasp for all ADL activities including dressing, bathing and self care. Status at recert 81/46/3136 - 35#     5. Patient will have improved right lateral pinch strength of  5 pounds in order to perform fine motor tasks such as turning pages, turning ignition and open containers.   Status at recert 60/16/4820 - 3#     PLAN  []  Upgrade activities as tolerated YES Continue plan of care   []  Discharge due to :    []  Other:      Therapist: MALLIKA Jorgensen/L    Date: 12/30/2022 Time: 8:54 AM     Future Appointments   Date Time Provider Marc Ruvalcaba   12/30/2022  9:00 AM Suzanne Kincaid OT MMCPTPB [facility]   1/3/2023  9:00 AM Clayton Braun JSRWAJU [facility]   1/6/2023  8:30 AM Calyton Braun SJRBYLG [facility]   1/10/2023  9:30 AM Clayton Braun RNMRFKO [facility]   1/13/2023  8:30 AM Clayton Braun DPUUQTI [facility]   1/17/2023  9:00 AM Clayton Braun XFMMNDZ [facility]   1/20/2023  8:30 AM Clayton HOUSER [facility]   1/24/2023  9:00 AM Jet Ann HUSCAEI [address] [facility]   1/27/2023  8:30 AM Jet Ann DCLOPUG [address] [facility]   1/31/2023  9:30 AM Jet Ann LDNJYTZ [address] [facility]   9/5/2023  9:50 AM [facility]   9/12/2023 10:00 AM PRANAY Angela

## 2023-01-03 ENCOUNTER — HOSPITAL ENCOUNTER (OUTPATIENT)
Dept: PHYSICAL THERAPY | Age: 68
End: 2023-01-03
Payer: MEDICARE

## 2023-01-06 ENCOUNTER — HOSPITAL ENCOUNTER (OUTPATIENT)
Dept: PHYSICAL THERAPY | Age: 68
Discharge: HOME | End: 2023-01-06
Payer: MEDICARE

## 2023-01-06 PROCEDURE — 97110 THERAPEUTIC EXERCISES: CPT

## 2023-01-06 PROCEDURE — 97530 THERAPEUTIC ACTIVITIES: CPT

## 2023-01-06 NOTE — PROGRESS NOTES
OT DAILY TREATMENT NOTE 10-18    Patient Name: Aquiles Ardon  Date:2023  : 1955  [x]  Patient  Verified  Payor: VA MEDICARE / Plan: VA MEDICARE PART A & B / Product Type: Medicare /    In time:830  Out time:900  Total Treatment Time (min): 30  Visit #: 3 of 8    Medicare/BCBS Only   Total Timed Codes (min):  30 1:1 Treatment Time:  30     Treatment Area: Hemiplegia and hemiparesis following cerebral infarction affecting right dominant side [I69.351]    SUBJECTIVE  Pain Level (0-10 scale): 0/10  Any medication changes, allergies to medications, adverse drug reactions, diagnosis change, or new procedure performed?: [x] No    [] Yes (see summary sheet for update)  Subjective functional status/changes:   [] No changes reported  This is hard today (Perfection)    OBJECTIVE    20 min Therapeutic Exercise:  [] See flow sheet :   Rationale: increase ROM and increase strength to improve the patients ability to      B Floor Reach   Shrugs/Retraction   PROM- Supination  Self Supination   AAROM Supination   L BAR: Right UE- Med Soft theraputty- Flatten- in stand     10 min Therapeutic Activity:  []  See flow sheet :   Rationale: increase ROM and improve coordination  to improve the patients ability to pinch    Perfection: in stand- removal only- timed       With   [] TE   [] TA   [] neuro   [] other: Patient Education: [x] Review HEP    [] Progressed/Changed HEP based on:   [] positioning   [] body mechanics   [] transfers   [] heat/ice application   [] Splint wear/care   [] Sensory re-education   [] scar management      [] other:            Other Objective/Functional Measures:     Perfection:  9:20    Pain Level (0-10 scale) post treatment: 0/10    ASSESSMENT/Changes in Function: improved AROM with supination     Patient will continue to benefit from skilled OT services to modify and progress therapeutic interventions, address ROM deficits, address strength deficits, and instruct in home and community integration to attain remaining goals. []  See Plan of Care  []  See progress note/recertification  []  See Discharge Summary         Progress towards goals / Updated goals:  Patient will be able to use right UE as an assist for 50% of the time or greater during self-care and IADL tasks. Status at recert 45/01/7949 - 45-76%     2. Patient will be independent in demonstrating and performing activity modification strategies to aid in success for ADL/IADL tasks. Status at recert 61/26/7552 - pt using adaptive toothbrush gamble, no other aids at this time     3. Patient will have 0 degrees of right forearm supination in order to perform ADL's including washing face and accepting change. Status at Eval: 38 deg from neutral, instructed in supination PROM/AROM stretches  Current Status (1/6/2023): able to Actively supinate to approx. 0 post AAROM/PROM/SROM     4. Goal:* Pt will have 15 pounds of  in the right hand to allow for functional grasp for all ADL activities including dressing, bathing and self care. Status at recert 30/98/6584 - 17#     5. Patient will have improved right lateral pinch strength of  5 pounds in order to perform fine motor tasks such as turning pages, turning ignition and open containers.   Status at recert 45/56/1565 - 3#    PLAN  []  Upgrade activities as tolerated     [x]  Continue plan of care  []  Update interventions per flow sheet       []  Discharge due to:_  []  Other:_      DARELL Dela Cruz  1/6/2023  8:37 AM        Future Appointments   Date Time Provider Marc Ruvalcaba   1/10/2023  9:30 AM Corby Armas EKQLVYS [facility]   1/13/2023  8:30 AM Corby Armas USXYLEH [facility]   1/17/2023  9:00 AM Corby Armsa JFSCWTW [facility]   1/20/2023  8:30 AM Corby Armas FSENJSP [facility]   1/24/2023  9:00 AM Corby Armas [id number] [facility]   1/27/2023  8:30 AM Corby Armas LFEQUVH [facility]   1/31/2023  9:30 AM Corby Armas OYWXNFY [facility]   9/5/2023  9:50 AM [facility] NURSE [facility] ADEBAYO LEAVITT   9/12/2023 10:00 AM Eleanore Mortimer, [place]

## 2023-01-10 ENCOUNTER — HOSPITAL ENCOUNTER (OUTPATIENT)
Dept: PHYSICAL THERAPY | Age: 68
Discharge: HOME | End: 2023-01-10
Payer: MEDICARE

## 2023-01-10 PROCEDURE — 97110 THERAPEUTIC EXERCISES: CPT

## 2023-01-10 PROCEDURE — 97530 THERAPEUTIC ACTIVITIES: CPT

## 2023-01-10 NOTE — PROGRESS NOTES
OT DAILY TREATMENT NOTE     Patient Name: Perez Pendleton  Date:1/10/2023  : 1955  [x]  Patient  Verified  Payor: [place] MEDICARE / Plan: [place] MEDICARE PART A & B / Product Type: Medicare /    In time:9:30 AM  Out time:10:00 AM  Total Treatment Time (min): 30  Visit #: 4 of 8    Medicare/BCBS Only   Total Timed Codes (min):  30 1:1 Treatment Time:  30     Treatment Area: Hemiplegia and hemiparesis following cerebral infarction affecting right dominant side [I69.351]    SUBJECTIVE  Pain Level (0-10 scale): 0/10  Any medication changes, allergies to medications, adverse drug reactions, diagnosis change, or new procedure performed?: [x] No    [] Yes (see summary sheet for update)  Subjective functional status/changes:   [] No changes reported  \"I can move my arm up higher. I do the foam pieces at home too. \"    \"It is getting easier to pick stuff up from the floor. I even use the arm to dust the house. \"    OBJECTIVE     20 min Therapeutic Exercise:  [] See flow sheet :   Rationale: increase ROM and increase strength to improve the patients ability to       B Floor Reach   Shrugs/Retraction   Ralph shoulder flexion with cane   right shoulder abduction with cane AAROM  PROM- Supination, wrist extension, digit extension  Self Supination     10 min Therapeutic Activity:  []  See flow sheet :   Rationale: increase ROM and improve coordination  to improve the patients ability to pinch     Perfection: in stand- removal only- timed   Foam cubes into cup with lateral pinch x13 - standing and seated       With   [] TE   [] TA   [] neuro   [] other: Patient Education: [x] Review HEP    [] Progressed/Changed HEP based on:   [] positioning   [] body mechanics   [] transfers   [] heat/ice application   [] Splint wear/care   [] Sensory re-education   [] scar management      [] other:            Other Objective/Functional Measures:   Perfection:  9 minutes      Pain Level (0-10 scale) post treatment: 0/10    ASSESSMENT/Changes in Function: Pt reports improved functional use of right UE for picking up floor items and using the right hand for cleaning duties since beginning this course of skilled OT. Progressing towards goals. Patient will continue to benefit from skilled OT services to modify and progress therapeutic interventions, address ROM deficits, address strength deficits, and instruct in home and community integration to attain remaining goals. []  See Plan of Care  []  See progress note/recertification  []  See Discharge Summary         Progress towards goals / Updated goals:  Patient will be able to use right UE as an assist for 50% of the time or greater during self-care and IADL tasks. Status at recert 26/96/0261 - 63-77%     2. Patient will be independent in demonstrating and performing activity modification strategies to aid in success for ADL/IADL tasks. [facility] - pt using adaptive toothbrush gamble, no other aids at this time     3. Patient will have 0 degrees of right forearm supination in order to perform ADL's including washing face and accepting change. Status at [facility]: 38 deg from neutral, instructed in supination PROM/AROM stretches  Current Status (1/6/2023): able to Actively supinate to approx. 0 post AAROM/PROM/SROM     4. Goal:* Pt will have 15 pounds of  in the right hand to allow for functional grasp for all ADL activities including dressing, bathing and self care. Status at recert 25/23/0460 - 34#     5. Patient will have improved right lateral pinch strength of  5 pounds in order to perform fine motor tasks such as turning pages, turning ignition and open containers.   Status at recert 75/12/7713 - 3#    PLAN  [x]  Upgrade activities as tolerated     [x]  Continue plan of care  []  Update interventions per flow sheet       []  Discharge due to:_  []  Other:_      Annalee Santos OT 1/10/2023  9:34 AM    Future Appointments   Date Time Provider [place]   1/13/2023  8:30 AM [facility] OOKYAJI [facility]   1/17/2023  9:00 AM [facility] LVGZRGO [facility]   1/20/2023  8:30 AM [facility] LBWLRWT [facility]   1/24/2023  9:00 AM [facility] DZZBLSF [facility]   1/27/2023  8:30 AM [facility] SSPTIBI [facility]   1/31/2023  9:30 AM [facility] LQABLZO [facility]   9/5/2023  9:50 AM [facility]UR[facility] NOAM LEAVITT   9/12/2023 10:00 AM PRANAY Lopez

## 2023-01-13 ENCOUNTER — HOSPITAL ENCOUNTER (OUTPATIENT)
Dept: PHYSICAL THERAPY | Age: 68
Discharge: HOME | End: 2023-01-13
Payer: MEDICARE

## 2023-01-13 PROCEDURE — 97110 THERAPEUTIC EXERCISES: CPT

## 2023-01-13 PROCEDURE — 97112 NEUROMUSCULAR REEDUCATION: CPT

## 2023-01-13 NOTE — PROGRESS NOTES
OT DAILY TREATMENT NOTE     Patient Name: Brit Urias  Date:2023  : 1955  [x]  Patient  Verified  Payor: VA MEDICARE / Plan: VA MEDICARE PART A & B / Product Type: Medicare /    In time:832  Out time:858  Total Treatment Time (min): 26  Visit #: 5 of 8    Medicare/BCBS Only   Total Timed Codes (min):  26 1:1 Treatment Time:       Treatment Area: Hemiplegia and hemiparesis following cerebral infarction affecting right dominant side [I69.351]    SUBJECTIVE  Pain Level (0-10 scale): 0/10  Any medication changes, allergies to medications, adverse drug reactions, diagnosis change, or new procedure performed?: [x] No    [] Yes (see summary sheet for update)  Subjective functional status/changes:   [] No changes reported  I have been doing good with my exercises at home. I have been folding towels with my wife. OBJECTIVE    10 min Therapeutic Exercise:  [] See flow sheet :      Rationale: increase ROM and increase strength to improve the patients ability to     Shrugs/retraction (15x)  BUE floor reaches (15x)  B shoulder flexion with cane (2x15)    8 min Therapeutic Activity:  []  See flow sheet :     Rationale: increase strength and improve coordination  to improve the patients ability to pinch. Perfection: in stand, using Right UE, timed (see objective below), with use of dycem       8 min Neuromuscular Re-education:  []  See flow sheet :   Rationale: increase proprioception  to improve the patients ability to weight bear through the right UE.       Theraputty (soft/medium) with L bar in standing for weightbearing and prehension   Folding clothes/towels in standing using BUE with Left UE used as functional assist , various pinches used         With   [] TE   [] TA   [] neuro   [] other: Patient Education: [x] Review HEP    [] Progressed/Changed HEP based on:   [] positioning   [] body mechanics   [] transfers   [] heat/ice application   [] Splint wear/care   [] Sensory re-education   [] scar management      [] other:            Other Objective/Functional Measures:     Perfection: 5.02, Perfection in standing: pt. knocked over some pegs and required assistance to place them back. Pain Level (0-10 scale) post treatment: 0/10    ASSESSMENT/Changes in Function: Pt reports improved functional use of right UE when folding towels and clothes at home. Pt. Progressing towards goals. Patient will continue to benefit from skilled OT services to modify and progress therapeutic interventions, address ROM deficits, and address strength deficits to attain remaining goals. []  See Plan of Care  []  See progress note/recertification  []  See Discharge Summary         Progress towards goals / Updated goals:  Patient will be able to use right UE as an assist for 50% of the time or greater during self-care and IADL tasks. Status at recert 62/20/1949 - 23-71%     2. Patient will be independent in demonstrating and performing activity modification strategies to aid in success for ADL/IADL tasks. Status at recert 92/88/8806 - pt using adaptive toothbrush gamble, no other aids at this time     3. Patient will have 0 degrees of right forearm supination in order to perform ADL's including washing face and accepting change. Status at Eval: 38 deg from neutral, instructed in supination PROM/AROM stretches  Current Status (1/6/2023): able to Actively supinate to approx. 0 post AAROM/PROM/SROM     4. Goal:* Pt will have 15 pounds of  in the right hand to allow for functional grasp for all ADL activities including dressing, bathing and self care. Status at recert 44/42/6667 - 85#     5. Patient will have improved right lateral pinch strength of  5 pounds in order to perform fine motor tasks such as turning pages, turning ignition and open containers.   Status at recert 75/67/4716 - 3#    PLAN  []  Upgrade activities as tolerated     [x]  Continue plan of care  []  Update interventions per flow sheet       [] Discharge due to:_  []  Other:_      Burdette Heimlich, OTAS 1/13/2023  8:06 AM  I was present during the entire treatment, directing and participating in the treatment. Lux Triplett.  [facility] Appointments   Date Time Provider [place]   1/13/2023  8:30 AM Ethyl Boning [id number] [facility]   1/17/2023  9:00 AM Ethyl Boning [id number] [facility]   1/20/2023  8:30 AM Ethyl Boning QMRVTUS [facility]   1/24/2023  9:00 AM Ethyl Boning ATVHBVA [facility]   1/27/2023  8:30 AM Ethyl Boning FCLTKYW [facility]   1/31/2023  9:30 AM Ethyl Boning IROJEFD [facility]   9/5/2023  9:50 AM [facility] NURSE [facility] ADEBAYO [facility]   9/12/2023 10:00 AM PRANAY Workman

## 2023-01-17 ENCOUNTER — HOSPITAL ENCOUNTER (OUTPATIENT)
Dept: PHYSICAL THERAPY | Age: 68
Discharge: HOME | End: 2023-01-17
Payer: MEDICARE

## 2023-01-17 PROCEDURE — 97110 THERAPEUTIC EXERCISES: CPT

## 2023-01-17 PROCEDURE — 97530 THERAPEUTIC ACTIVITIES: CPT

## 2023-01-17 NOTE — PROGRESS NOTES
OT DAILY TREATMENT NOTE     Patient Name: Alexis Villafuerte  Date:2023  : 1955  [x]  Patient  Verified  Payor: VA MEDICARE / Plan: VA MEDICARE PART A & B / Product Type: Medicare /    In time:900  Out time:928  Total Treatment Time (min): 28  Visit #: 6 of 8    Medicare/BCBS Only   Total Timed Codes (min):  28 1:1 Treatment Time:  28     Treatment Area: Hemiplegia and hemiparesis following cerebral infarction affecting right dominant side [I69.351]    SUBJECTIVE  Pain Level (0-10 scale): 0/10  Any medication changes, allergies to medications, adverse drug reactions, diagnosis change, or new procedure performed?: [x] No    [] Yes (see summary sheet for update)  Subjective functional status/changes:   [] No changes reported  My right hand is stiffer this morning. OBJECTIVE      12 min Therapeutic Exercise:  [] See flow sheet :     Rationale: increase ROM, improve coordination, and increase proprioception to improve the patients ability to perform daily tasks while improving grasp and lateral pinch. B shoulder shrugs/retraction 15x  Clothes pins 1#, 20x-Tip Pinch       16 min Therapeutic Activity:  []  See flow sheet :     Rationale: increase strength and improve coordination  to improve the patients ability to hold a pinch and reach out in front of their body.      Right hand:  Velcro chips 10x - Standing  1 inch peg removal 6x- Seated to encourage Right shoulder flexion to improve ability to reach   Various clothing from Floor pick-ups 5/5- performed from seated position with left hand used as functional assist     With   [] TE   [] TA   [] neuro   [] other: Patient Education: [x] Review HEP    [] Progressed/Changed HEP based on:   [] positioning   [] body mechanics   [] transfers   [] heat/ice application   [] Splint wear/care   [] Sensory re-education   [] scar management      [] other:            Other Objective/Functional Measures:     Increased stiffness in right hand when performing functional tasks. Cueing to slow movement down when performing lateral pinch   Reoccurrence of holding breath present during treatment     Pain Level (0-10 scale) post treatment: 0/10    ASSESSMENT/Changes in Function: Increased stiffness with functional movements that decreased with PROM of wrist to decrease tone     Patient will continue to benefit from skilled OT services to modify and progress therapeutic interventions, address functional mobility deficits, address ROM deficits, and analyze and cue movement patterns to attain remaining goals. []  See Plan of Care  []  See progress note/recertification  []  See Discharge Summary         Progress towards goals / Updated goals:  Patient will be able to use right UE as an assist for 50% of the time or greater during self-care and IADL tasks. Status at recert 75/53/1537 - 05-06%     2. Patient will be independent in demonstrating and performing activity modification strategies to aid in success for ADL/IADL tasks. Status at recert 90/85/2306 - pt using adaptive toothbrush gamble, no other aids at this time. 3. Patient will have 0 degrees of right forearm supination in order to perform ADL's including washing face and accepting change. Status at Eval: 38 deg from neutral, instructed in supination PROM/AROM stretches  Current Status (1/6/2023): able to Actively supinate to approx. 0 post AAROM/PROM/SROM     4. Goal:* Pt will have 15 pounds of  in the right hand to allow for functional grasp for all ADL activities including dressing, bathing and self care. Status at recert 49/03/6575 - 67#     5. Patient will have improved right lateral pinch strength of  5 pounds in order to perform fine motor tasks such as turning pages, turning ignition and open containers.   Status at recert 30/72/5727 - 3#  Current Status (1/17/2023): slight difficulty releasing pinch      PLAN  []  Upgrade activities as tolerated     [x]  Continue plan of care  []  Update interventions per flow sheet       []  Discharge due to:_  []  Other:_      BARBARA Mendoza 1/17/2023  8:55 AM  I was present during the entire treatment, directing and participating in the treatment. Aviva Salazar.  Russell Soto    [facility] Appointments   Date Time Provider Marc Ruvalcaba   1/17/2023  9:00 AM Karen Rodríguez GXUUIEL [facility]   1/20/2023  8:30 AM Karen Rodríguez FDOECCZ [facility]   1/24/2023  9:00 AM Karen Rodríguez RSKKVDI [facility]   1/27/2023  8:30 AM Karen Rodríguez QVGTZLI [facility]   1/31/2023  9:30 AM Karen Rodríguez TDVNGJC [facility]   9/5/2023  9:50 AM [facility] NOAM LEAVITT   9/12/2023 10:00 AM PRANAY Madison

## 2023-01-20 ENCOUNTER — HOSPITAL ENCOUNTER (OUTPATIENT)
Dept: PHYSICAL THERAPY | Age: 68
Discharge: HOME | End: 2023-01-20
Payer: MEDICARE

## 2023-01-20 PROCEDURE — 97530 THERAPEUTIC ACTIVITIES: CPT

## 2023-01-20 PROCEDURE — 97110 THERAPEUTIC EXERCISES: CPT

## 2023-01-20 NOTE — PROGRESS NOTES
OT DAILY TREATMENT NOTE     Patient Name: Pete Goldberg  Date:2023  : 1955  [x]  Patient  Verified  Payor: VA MEDICARE / Plan: VA MEDICARE PART A & B / Product Type: Medicare /    In time:833  Out time:857  Total Treatment Time (min): 28  Visit #: 7 of 8    Medicare/BCBS Only   Total Timed Codes (min):  28 1:1 Treatment Time:  28     Treatment Area: Hemiplegia and hemiparesis following cerebral infarction affecting right dominant side [I69.351]    SUBJECTIVE  Pain Level (0-10 scale): 0/10  Any medication changes, allergies to medications, adverse drug reactions, diagnosis change, or new procedure performed?: [x] No    [] Yes (see summary sheet for update)  Subjective functional status/changes:   [] No changes reported  I want to be able to reach my right shoulder a but higher. I am able to do the small shoulder arc easier than last time. OBJECTIVE    15 min Therapeutic Exercise:  [] See flow sheet :   Rationale: increase ROM to improve the patients ability to engage in AROM and raise the right shoulder above midline.      -B shoulder shrugs/retraction: 15x in sitting  -Right shoulder flexion with cane in sitting    13 min Therapeutic Activity:  []  See flow sheet :   Rationale: increase ROM and improve coordination  to improve the patients ability to make slow and controlled movements, and improve pinch/grasp.     -Perfection: timed (6:19), in standing with dycem, patient used left hand as a functional assist to stabilize the activity box.    -Shoulder arc: small, 2x, right to left, in standing, patient used left hand as a functional assist to align the hoops for easier pinch.      With   [] TE   [] TA   [] neuro   [] other: Patient Education: [x] Review HEP    [] Progressed/Changed HEP based on:   [] positioning   [] body mechanics   [] transfers   [] heat/ice application   [] Splint wear/care   [] Sensory re-education   [] scar management      [] other:            Other Objective/Functional Measures:  -Perfection: timed 6.19   -Patient knocked over some pegs and required assistance to place them back. Pain Level (0-10 scale) post treatment: 0/10    ASSESSMENT/Changes in Function:   Patient time in perfection increased from 5.02 to 6. 19. Patient is progressing towards goals. Patient will continue to benefit from skilled OT services to modify and progress therapeutic interventions, address functional mobility deficits, address ROM deficits, and address strength deficits to attain remaining goals. []  See Plan of Care  []  See progress note/recertification  []  See Discharge Summary         Progress towards goals / Updated goals:  Patient will be able to use right UE as an assist for 50% of the time or greater during self-care and IADL tasks. Status at [facility] 90/93/8818 - 37-43%  Current Status (1/20/2023): patient progressing towards goals by engaging in small shoulder arc in standing. 2. Patient will be independent in demonstrating and performing activity modification strategies to aid in success for ADL/IADL tasks. Status at [facility] 89/28/4712 - pt using adaptive toothbrush gamble, no other aids at this time. 3. Patient will have 0 degrees of right forearm supination in order to perform ADL's including washing face and accepting change. Status at Eval: 38 deg from neutral, instructed in supination PROM/AROM stretches  Current Status (1/6/2023): able to Actively supinate to approx. 0 post AAROM/PROM/SROM     4. Goal:* Pt will have 15 pounds of  in the right hand to allow for functional grasp for all ADL activities including dressing, bathing and self care. Status at [facility] 37/71/1000 - 42#     5. Patient will have improved right lateral pinch strength of  5 pounds in order to perform fine motor tasks such as turning pages, turning ignition and open containers.   Status at [facility] 50/87/5714 - 3#  Current Status (1/17/2023): slight difficulty releasing pinch       PLAN  [] Upgrade activities as tolerated     [x]  Continue plan of care  []  Update interventions per flow sheet       []  Discharge due to:_  []  Other:_      BARBARA Orr 1/20/2023  8:31 AM  I was present during the entire treatment, directing and participating in the treatment. Marguerite Peacock.  Holly Ramirez    Future Appointments   Date Time Provider Marc Ruvalcaba   1/24/2023  9:00 AM Dale Russ SBYXPJA [facility]   1/27/2023  8:30 AM Dale FRANKLIN [facility]   1/31/2023  9:30 AM Dale Russ ILNEGHI [facility]   9/5/2023  9:50 AM [facility] NOAM LEAVITT   9/12/2023 10:00 AM PRANAY Edwards

## 2023-01-20 NOTE — PROGRESS NOTES
OT DAILY TREATMENT NOTE     Patient Name: Faheem Laboy  Date:2023  : 1955  [x]  Patient  Verified  Payor: VA MEDICARE / Plan: VA MEDICARE PART A & B / Product Type: Medicare /    In time:833  Out time:857  Total Treatment Time (min): 25  Visit #: 7 of 8    Medicare/BCBS Only   Total Timed Codes (min):  25 1:1 Treatment Time:  25     Treatment Area: Hemiplegia and hemiparesis following cerebral infarction affecting right dominant side [I69.351]    SUBJECTIVE  Pain Level (0-10 scale): 0/10  Any medication changes, allergies to medications, adverse drug reactions, diagnosis change, or new procedure performed?: [x] No    [] Yes (see summary sheet for update)  Subjective functional status/changes:   [] No changes reported  I want to be able to reach my right shoulder a but higher. I am able to do the small shoulder arc easier than last time. OBJECTIVE    12 min Therapeutic Exercise:  [] See flow sheet :   Rationale: increase ROM to improve the patients ability to engage in AROM and raise the right shoulder above midline.      -B shoulder shrugs/retraction: 15x in sitting  -B UE shoulder flexion with cane in sitting with hold    13 min Therapeutic Activity:  []  See flow sheet :   Rationale: increase ROM and improve coordination  to improve the patients ability to make slow and controlled movements, and improve pinch/grasp.     -Perfection: timed (6:19), in standing with dycem, patient used left hand as a functional assist to stabilize the activity box. , used Right hand tasked to remove using lateral pinch   -Shoulder arc: small, 2x, right to left, in standing, patient used left hand as a functional assist to align the hoops for easier pinch.      With   [] TE   [] TA   [] neuro   [] other: Patient Education: [x] Review HEP    [] Progressed/Changed HEP based on:   [] positioning   [] body mechanics   [] transfers   [] heat/ice application   [] Splint wear/care   [] Sensory re-education   [] scar management      [] other:            Other Objective/Functional Measures:  -Perfection: timed 6.19   -Patient knocked over some pegs and required assistance to place them back.   -able to maintain lateral pinch up until midline     Pain Level (0-10 scale) post treatment: 0/10    ASSESSMENT/Changes in Function:   Patient time in perfection increased from 5.02 to 6. 19. Patient is progressing towards goals. Patient will continue to benefit from skilled OT services to modify and progress therapeutic interventions, address functional mobility deficits, address ROM deficits, and address strength deficits to attain remaining goals. []  See Plan of Care  []  See progress note/recertification  []  See Discharge Summary         Progress towards goals / Updated goals:  Patient will be able to use right UE as an assist for 50% of the time or greater during self-care and IADL tasks. Status at [facility] 39/88/0478 - 02-93%  Current Status (1/20/2023): patient progressing towards goals by engaging in small shoulder arc in standing. 2. Patient will be independent in demonstrating and performing activity modification strategies to aid in success for ADL/IADL tasks. Status at [facility] 82/56/9996 - pt using adaptive toothbrush gamble, no other aids at this time. 3. Patient will have 0 degrees of right forearm supination in order to perform ADL's including washing face and accepting change. Status at Eval: 38 deg from neutral, instructed in supination PROM/AROM stretches  Current Status (1/6/2023): able to Actively supinate to approx. 0 post AAROM/PROM/SROM     4. Goal:* Pt will have 15 pounds of  in the right hand to allow for functional grasp for all ADL activities including dressing, bathing and self care. Status at [facility] 46/45/5139 - 05#     5.  Patient will have improved right lateral pinch strength of  5 pounds in order to perform fine motor tasks such as turning pages, turning ignition and open containers. Status at recert 04/15/0325 - 3#  Current Status (2023): slight difficulty releasing pinch       PLAN  []  Upgrade activities as tolerated     [x]  Continue plan of care  []  Update interventions per flow sheet       []  Discharge due to:_  []  Other:_      BARBARA Dominguez 2023  8:31 AM  I was present during the entire treatment, directing and participating in the treatment. Cecilio Byrd    Future Appointments   Date Time Provider Marc Ruvalcaba   2023  9:00 AM Diane Roe PGFVYOX [facility]   2023  8:30 AM Diane Roe RBFVMMG [facility]   2023  9:30 AM Diane Roe [id number] [facility]   2023  9:50 AM [facility] NOAM LEAVITT   2023 10:00 AM PRANAY Leach

## 2023-01-24 ENCOUNTER — HOSPITAL ENCOUNTER (OUTPATIENT)
Dept: PHYSICAL THERAPY | Age: 68
Discharge: HOME | End: 2023-01-24
Payer: MEDICARE

## 2023-01-24 PROCEDURE — 97110 THERAPEUTIC EXERCISES: CPT

## 2023-01-24 PROCEDURE — 97530 THERAPEUTIC ACTIVITIES: CPT

## 2023-01-24 NOTE — THERAPY RECERTIFICATION
[facility]  [address] [place] [address]  [phone] fax    Continued Plan of Care/ Re-certification for Occupational Therapy Services    Patient name: Josey Saldivar Start of Care: 10/14/2022   Referral source: Amber Medel NP : 1955   Medical/Treatment Diagnosis: Hemiplegia and hemiparesis following cerebral infarction affecting right dominant side [I69.351]  Payor: [place] MEDICARE / Plan: VA MEDICARE PART A & B / Product Type: Medicare /  Onset Date:2019      Prior Hospitalization: see medical history Provider#: [id number]   Medications: Verified on Patient Summary List      Comorbidities: Hx of CVA, Arthritis, High blood pressure    Prior Level of Function: Independent in ADLs, yard work, watching football, travel, car washing     Visits from [place] of Care: 15                                            Missed Visits: 0  Visits from Start of Care: 22    Missed Visits: 0    The Plan of Care and following information is based on the patient's current status:  Patient will be able to use right UE as an assist for 50% of the time or greater during self-care and IADL tasks. Status at [facility]  - 02-91%  Current Status (2023): 35%, to be reassessed at next visit     2. Patient will be independent in demonstrating and performing activity modification strategies to aid in success for ADL/IADL tasks. Status at recert  - pt using adaptive toothbrush gamble, no other aids at this time. Current Status (2023): Goal Met    3. Patient will have 0 degrees of right forearm supination in order to perform ADL's including washing face and accepting change. Status at Eval: 38 deg from neutral, instructed in supination PROM/AROM stretches  Current Status (2023): able to Actively supinate to approx.  0 post AAROM/PROM/SROM     4. Goal:* Pt will have 15 pounds of  in the right hand to allow for functional grasp for all ADL activities including dressing, bathing and self care. Status at recert 47/16/9759 - 43#  Current Status (1/24/2023): To be assessed next visit      5. Patient will have improved right lateral pinch strength of  5 pounds in order to perform fine motor tasks such as turning pages, turning ignition and open containers. Status at recert 34/04/0010 - 3#  Current Status (1/24/2023): To be assessed next        Key functional changes: improved volitional thumb movement, improving functional use       Problems/ barriers to goal attainment: physical     Treatment Plan: Therapeutic exercise, Therapeutic activities, Neuromuscular re-education, Patient education, and ADLs/IADLs      Patient Goal (s) has been updated and includes: use arm more     Goals for this certification period to be accomplished in 1 treatments:  Patient will be able to use right UE as an assist for 50% of the time or greater during self-care and IADL tasks. Status at PN: (1/24/2023): 35%, to be reassessed at next visit    3. Patient will have 0 degrees of right forearm supination in order to perform ADL's including washing face and accepting change. Status at Last PN (1/24/2023): able to Actively supinate to approx. 0 post AAROM/PROM/SROM     4. Goal:* Pt will have 15 pounds of  in the right hand to allow for functional grasp for all ADL activities including dressing, bathing and self care. Status at Last PN (1/24/2023): 12#, To be assessed next visit      5. Patient will have improved right lateral pinch strength of  5 pounds in order to perform fine motor tasks such as turning pages, turning ignition and open containers. Status at Last PN (1/24/2023): 3#, To be assessed next visit     Frequency / Duration: Patient to be seen 1 times per week for 1 treatments:    Assessment / Recommendations:Patient will benefit from continued skilled occupational therapy to increase upper extremity function and functional independence. Certification Period: 1/24/23-2/23/23    DARELL Stapleton  1/24/2023 9:06 AM    ________________________________________________________________________  I certify that the above Therapy Services are being furnished while the patient is under my care. I agree with the treatment plan and certify that this therapy is necessary.       Physician's Signature:____________Date:_________TIME:________     Deon Jerez NP  ** Signature, Date and Time must be completed for valid certification **      Please sign and return to [facility]  [address] [place] [address]            [phone] fax

## 2023-01-24 NOTE — PROGRESS NOTES
OT DAILY TREATMENT NOTE     Patient Name: Sami Montanez  Date:2023  : 1955  [x]  Patient  Verified  Payor: VA MEDICARE / Plan: VA MEDICARE PART A & B / Product Type: Medicare /    In time:900  Out time:930  Total Treatment Time (min): 30  Visit #: 8 of 8    Medicare/BCBS Only   Total Timed Codes (min):  30 1:1 Treatment Time:  30     Treatment Area: Hemiplegia and hemiparesis following cerebral infarction affecting right dominant side [I69.351]    SUBJECTIVE  Pain Level (0-10 scale): 0/10  Any medication changes, allergies to medications, adverse drug reactions, diagnosis change, or new procedure performed?: [x] No    [] Yes (see summary sheet for update)  Subjective functional status/changes:   [] No changes reported  Warming my shoulder up really helped with the shoulder arc, I was able to hold the rings longer. OBJECTIVE    15 min Therapeutic Exercise:  [] See flow sheet :   Rationale: increase ROM to improve the patients ability to reach.    -B shoulder shrugs/retraction: 15x in sitting  -B shoulder flexion with therapy ball: 2x15 in sitting  -Right shoulder abduction: with therapy ball, 2x15 in sitting    15 min Therapeutic Activity:  []  See flow sheet :   Rationale: increase ROM and improve coordination  to improve the patients ability to reach across midline and improve lateral pinch.    -Small shoulder arc: 2x in standing, right to left, patient used left hand as a functional assist to align the hoops for easier pinch.    -9-hole: remove only, with dycem in standing    With   [] TE   [] TA   [] neuro   [] other: Patient Education: [x] Review HEP    [] Progressed/Changed HEP based on:   [] positioning   [] body mechanics   [] transfers   [] heat/ice application   [] Splint wear/care   [] Sensory re-education   [] scar management      [] other:            Other Objective/Functional Measures:   -Patient able to maintain lateral pinch on shoulder arc until past midline.   -Patient required min assist in shoulder abduction to keep right hand steady on the therapy ball. Pain Level (0-10 scale) post treatment: 0/10    ASSESSMENT/Changes in Function:   -Patient improved in ROM with small shoulder arc.   -Patient demonstrated decreased tone in right hand after engaging in shoulder ROM exercises. -Patient is progressing towards goals. Patient will continue to benefit from skilled OT services to modify and progress therapeutic interventions, address functional mobility deficits, address ROM deficits, and address strength deficits to attain remaining goals. []  See Plan of Care  []  See progress note/recertification  []  See Discharge Summary         Progress towards goals / Updated goals:  Patient will be able to use right UE as an assist for 50% of the time or greater during self-care and IADL tasks. Status at [facility] 54/49/2270 - 73-17%  Current Status (1/24/2023): patient progressing towards goals by engaging in small shoulder arc in standing. 2. Patient will be independent in demonstrating and performing activity modification strategies to aid in success for ADL/IADL tasks. Status at [facility] 81/84/4680 - pt using adaptive toothbrush gamble, no other aids at this time. 3. Patient will have 0 degrees of right forearm supination in order to perform ADL's including washing face and accepting change. Status at Eval: 38 deg from neutral, instructed in supination PROM/AROM stretches  Current Status (1/6/2023): able to Actively supinate to approx. 0 post AAROM/PROM/SROM     4. Goal:* Pt will have 15 pounds of  in the right hand to allow for functional grasp for all ADL activities including dressing, bathing and self care. Status at [facility] 96/25/0002 - 82#     5. Patient will have improved right lateral pinch strength of  5 pounds in order to perform fine motor tasks such as turning pages, turning ignition and open containers.   Status at [facility] 49/93/1832 - 3#  Current Status (1/17/2023): slight difficulty releasing pinch       PLAN  []  Upgrade activities as tolerated     [x]  Continue plan of care  []  Update interventions per flow sheet       []  Discharge due to:_  []  Other:_      BARBARA Macias 1/24/2023  8:44 AM  I was present during the entire treatment, directing and participating in the treatment. Dakotah Sterling.  Carmela Lean      Future Appointments   Date Time Provider Marc Ruvalcaba   1/27/2023  8:30 AM Sam Rather SATIKOU [facility]   1/31/2023  9:30 AM Sam Rather IRYLRHR [facility]   9/5/2023  9:50 AM [facility] NURSE NOAM LEAVITT   9/12/2023 10:00 AM PARNAY Palomino

## 2023-01-27 ENCOUNTER — HOSPITAL ENCOUNTER (OUTPATIENT)
Dept: PHYSICAL THERAPY | Age: 68
Discharge: HOME | End: 2023-01-27
Payer: MEDICARE

## 2023-01-27 PROCEDURE — 97530 THERAPEUTIC ACTIVITIES: CPT

## 2023-01-27 PROCEDURE — 97110 THERAPEUTIC EXERCISES: CPT

## 2023-01-27 NOTE — PROGRESS NOTES
OT DAILY TREATMENT NOTE 10-18    Patient Name: Irina Corado  Date:2023  : 1955  [x]  Patient  Verified  Payor: Narda Scanlon / Plan: VA MEDICARE PART A & B / Product Type: Medicare /    In time:830  Out time:900  Total Treatment Time (min): 30  Visit #: 1 of 1    Medicare/BCBS Only   Total Timed Codes (min):  30 1:1 Treatment Time:  30     Treatment Area: Hemiplegia and hemiparesis following cerebral infarction affecting right dominant side [I69.351]    SUBJECTIVE  Pain Level (0-10 scale): 0/10  Any medication changes, allergies to medications, adverse drug reactions, diagnosis change, or new procedure performed?: [x] No    [] Yes (see summary sheet for update)  Subjective functional status/changes:   [] No changes reported  I think I am going to ask my doctor for a referral back to PT    OBJECTIVE    20 min Therapeutic Exercise:  [] See flow sheet :   Rationale: increase ROM and increase strength to improve the patients ability to , reach     Recheck for Discharge   Shrugs/Retractions   B Floor Reach   Wrist PROM/AAROM  DC HEP/Instructions     10 min Therapeutic Activity:  []  See flow sheet :   Rationale: increase ROM and improve coordination  to improve the patients ability to manipulate small items     Recheck for Discharge   FOTO          With   [] TE   [] TA   [] neuro   [] other: Patient Education: [x] Review HEP    [] Progressed/Changed HEP based on:   [] positioning   [] body mechanics   [] transfers   [] heat/ice application   [] Splint wear/care   [] Sensory re-education   [] scar management      [] other:            Other Objective/Functional Measures:     Shoulder ROM    2022   Flexion 95 (-19) 95   Extension      Abduction      ER      IR         Range of Motion:   Elbow/Wrist   Wrist 2022  Right/Left    Flex      Ext      UD      RD      FA       Pro      Sup 38 from neutral - 60 PROM/75 3   Elbow      Flex 80 (+7)    Ext 24 (-4)                      Measurements: Taken with Saym Dynamometer, in Lbs   Level 2 12/20/2022 1/27   Right 12 16    Left       Deficit       Change             Pinch Measurements: Taken with Pinch Gauge, in Lbs   (hand) 12/20/2022 1/27   Lateral       Right 3 3   Left       Deficit      Change         [facility]    12/20/2022 1/27 Change   Right 2 minutes 49 seconds  (Out only) 1 min 32  (Out only) 46%   Left       ** Unassisted in standing         FOTO  50     Pain Level (0-10 scale) post treatment: 0/10    ASSESSMENT/Changes in Function:  46% improved speed with 9 hole peg test peg removal, improvement with volitional lateral pinch, improved  strength along with AROM Supination. []  See Plan of Care  []  See progress note/recertification  [x]  See Discharge Summary         Progress towards goals / Updated goals:  Patient will be able to use right UE as an assist for 50% of the time or greater during self-care and IADL tasks. Status at PN: (1/24/2023): 35%, to be reassessed at next visit  Status at Discharge (1/27/2023): unchanged at 35%     3. Patient will have 0 degrees of right forearm supination in order to perform ADL's including washing face and accepting change. Status at Last PN (1/24/2023): able to Actively supinate to approx. 0 post AAROM/PROM/SROM   Status at Discharge (1/27/2023): 3, Goal Met    4. Goal:* Pt will have 15 pounds of  in the right hand to allow for functional grasp for all ADL activities including dressing, bathing and self care. Status at Last PN (1/24/2023): 12#, To be assessed next visit   Status at Discharge (1/27/2023): 16, Goal Met       5. Patient will have improved right lateral pinch strength of  5 pounds in order to perform fine motor tasks such as turning pages, turning ignition and open containers.   Status at Last PN (1/24/2023): 3#, To be assessed next visit   Status at Discharge (1/27/2023): 3#, unchanged          PLAN  []  Upgrade activities as tolerated     [x]  Continue plan of care  []  Update interventions per flow sheet       []  Discharge due to:_  []  Other:_      JUSTIN Lance/L  1/27/2023  8:19 AM        Future Appointments   Date Time Provider Marc Ruvalcaba   1/27/2023  8:30 AM Gretel People HYCAOTB [facility]   1/31/2023  9:30 AM Gretel People DCLAPSN [facility]   9/5/2023  9:50 AM [facility] NURSE NOAM LEAVITT   9/12/2023 10:00 AM PRANAY Queen

## 2023-01-27 NOTE — PROGRESS NOTES
[facility]  [phone] fax      Patient Name: Red Schultz  : 1955      Occupational Therapy Discharge Instructions        Continue with home exercise program for 2-3 times a day for 4 weeks then decrease to 1-2 times a day as needed/patient discretion. Continue with    [] Ice  as needed  times per day     [] Heat           Follow up with MD:     [] Upon completion of therapy     [x] As needed      Recommendations:    []   Return to activity with home program                    []  Return to activity with the following modifications :                              []  Practice Hand coordination activities                                      []  Wear Splint as prescribed:                    [] Return to/Join local gym        Additional comments:    - Continue with Thumb tasks  - Continue with NMES Wrist Holds       Please call with any questions or concerns. Thank you for your participation.          DARELL Teixeira  2023  8:53 AM

## 2023-01-31 ENCOUNTER — APPOINTMENT (OUTPATIENT)
Dept: PHYSICAL THERAPY | Age: 68
End: 2023-01-31
Payer: MEDICARE

## 2023-02-07 NOTE — PROGRESS NOTES
PT DAILY TREATMENT NOTE 10-18    Patient Name: Evie Ball  Date:10/23/2019  : 1955  [x]  Patient  Verified  Payor: VA MEDICARE / Plan: VA MEDICARE PART A & B / Product Type: Medicare /    In time:1030  Out time:1100  Total Treatment Time (min): 30  Visit #: 2 of 16    Medicare/BCBS Only   Total Timed Codes (min):  30 1:1 Treatment Time:  30       Treatment Area: Lower extremity weakness [R29.898]  Cerebral infarction, unspecified [I63.9]    SUBJECTIVE  Pain Level (0-10 scale): 0/10  Any medication changes, allergies to medications, adverse drug reactions, diagnosis change, or new procedure performed?: [x] No    [] Yes (see summary sheet for update)  Subjective functional status/changes:   [] No changes reported  Pt stated that he is doing well today    OBJECTIVE    30 min Therapeutic Exercise:  [x] See flow sheet :   Rationale: increase ROM, increase strength, improve coordination and improve balance to improve the patients ability to increase ease with walking    With   [x] TE   [] TA   [] neuro   [] other: Patient Education: [x] Review HEP    [] Progressed/Changed HEP based on:   [] positioning   [] body mechanics   [] transfers   [] heat/ice application    [] other:      Other Objective/Functional Measures:   Pt was CGA with walking and exercises today  No LOB with Romberg on floor with EO  Had some difficulty with step taps on the right     Pain Level (0-10 scale) post treatment: 0/10    ASSESSMENT/Changes in Function:   Initiated therex today per flow sheet. Pt put forth good effort with all exercises.  Pt reported compliance with HEP    Patient will continue to benefit from skilled PT services to modify and progress therapeutic interventions, address functional mobility deficits, address ROM deficits, address strength deficits, analyze and cue movement patterns, analyze and modify body mechanics/ergonomics, assess and modify postural abnormalities, address imbalance/dizziness and instruct in home and community integration to attain remaining goals. [x]  See Plan of Care  []  See progress note/recertification  []  See Discharge Summary         Progress towards goals / Updated goals:  Short Term Goals: To be accomplished in 1 weeks:               1. Pt will be compliant with a HEP to improve function. Goal met. 10/23/19    Long Term Goals: To be accomplished in 8 weeks:               1. Pt will ambulate 4 steps x 3 with 1 HR with supervision to be able to navigate stairs at home. 2. Pt will increase FOTO score by 7 pts to improve LE function. 3. Pt will increase right LE hip, quad and HS strength to 4/5 or greater to be able to navigate community distances. 4. Pt will demonstrate Romberg EO/EC on a compliant surface to decrease fall risk during ADL's.                5/. Pt will perform sit to stand x 10 to ease with transfers safely. 6. Pt will ambulate with LRAD >150 ft to be able to navigate short community distances.      PLAN  []  Upgrade activities as tolerated     [x]  Continue plan of care  []  Update interventions per flow sheet       []  Discharge due to:_  []  Other:_      Elías Barrios PTA 10/23/2019  10:22 AM    Future Appointments   Date Time Provider Marc Peg   10/23/2019 10:30 AM Matthew Doyle PTA [facility]   10/23/2019 11:00 AM Jet Andersen OTR/L [facility]   10/23/2019 11:45 AM Isela Bermudez, SLP [facility]   12/13/2019  8:30 AM Razia Perez MD [address] Arava Counseling:  Patient counseled regarding adverse effects of Arava including but not limited to nausea, vomiting, abnormalities in liver function tests. Patients may develop mouth sores, rash, diarrhea, and abnormalities in blood counts. The patient understands that monitoring is required including LFTs and blood counts.  There is a rare possibility of scarring of the liver and lung problems that can occur when taking methotrexate. Persistent nausea, loss of appetite, pale stools, dark urine, cough, and shortness of breath should be reported immediately. Patient advised to discontinue Arava treatment and consult with a physician prior to attempting conception. The patient will have to undergo a treatment to eliminate Arava from the body prior to conception.

## 2023-06-22 ENCOUNTER — APPOINTMENT (OUTPATIENT)
Facility: HOSPITAL | Age: 68
End: 2023-06-22
Payer: MEDICARE

## 2023-06-22 ENCOUNTER — HOSPITAL ENCOUNTER (EMERGENCY)
Facility: HOSPITAL | Age: 68
Discharge: HOME | End: 2023-06-22
Attending: EMERGENCY MEDICINE
Payer: MEDICARE

## 2023-06-22 VITALS
OXYGEN SATURATION: 97 % | HEART RATE: 80 BPM | WEIGHT: 215 LBS | RESPIRATION RATE: 18 BRPM | DIASTOLIC BLOOD PRESSURE: 67 MMHG | SYSTOLIC BLOOD PRESSURE: 161 MMHG | BODY MASS INDEX: 31.84 KG/M2 | TEMPERATURE: 97.6 F | HEIGHT: 69 IN

## 2023-06-22 DIAGNOSIS — T07.XXXA ABRASIONS OF MULTIPLE SITES: ICD-10-CM

## 2023-06-22 DIAGNOSIS — S05.12XA PERIORBITAL CONTUSION OF LEFT EYE, INITIAL ENCOUNTER: Primary | ICD-10-CM

## 2023-06-22 DIAGNOSIS — T14.8XXA HEMATOMA: ICD-10-CM

## 2023-06-22 LAB
ALBUMIN SERPL-MCNC: 3.7 G/DL (ref 3.4–5)
ALBUMIN/GLOB SERPL: 1.2 (ref 0.8–1.7)
ALP SERPL-CCNC: 134 U/L (ref 45–117)
ALT SERPL-CCNC: 20 U/L (ref 16–61)
ANION GAP SERPL CALC-SCNC: 5 MMOL/L (ref 3–18)
APPEARANCE UR: CLEAR
AST SERPL-CCNC: 17 U/L (ref 10–38)
BACTERIA URNS QL MICRO: NEGATIVE /HPF
BASOPHILS # BLD: 0.1 K/UL (ref 0–0.1)
BASOPHILS NFR BLD: 1 % (ref 0–2)
BILIRUB SERPL-MCNC: 0.4 MG/DL (ref 0.2–1)
BILIRUB UR QL: NEGATIVE
BUN SERPL-MCNC: 34 MG/DL (ref 7–18)
BUN/CREAT SERPL: 22 (ref 12–20)
CALCIUM SERPL-MCNC: 9 MG/DL (ref 8.5–10.1)
CHLORIDE SERPL-SCNC: 119 MMOL/L (ref 100–111)
CO2 SERPL-SCNC: 18 MMOL/L (ref 21–32)
COLOR UR: YELLOW
CREAT SERPL-MCNC: 1.55 MG/DL (ref 0.6–1.3)
DIFFERENTIAL METHOD BLD: ABNORMAL
EKG ATRIAL RATE: 41 BPM
EKG DIAGNOSIS: NORMAL
EKG Q-T INTERVAL: 510 MS
EKG QRS DURATION: 108 MS
EKG QTC CALCULATION (BAZETT): 415 MS
EKG R AXIS: 37 DEGREES
EKG T AXIS: 42 DEGREES
EKG VENTRICULAR RATE: 40 BPM
EOSINOPHIL # BLD: 0.1 K/UL (ref 0–0.4)
EOSINOPHIL NFR BLD: 1 % (ref 0–5)
EPITH CASTS URNS QL MICRO: ABNORMAL /LPF (ref 0–5)
ERYTHROCYTE [DISTWIDTH] IN BLOOD BY AUTOMATED COUNT: 16 % (ref 11.6–14.5)
GLOBULIN SER CALC-MCNC: 3.1 G/DL (ref 2–4)
GLUCOSE SERPL-MCNC: 99 MG/DL (ref 74–99)
GLUCOSE UR STRIP.AUTO-MCNC: NEGATIVE MG/DL
HCT VFR BLD AUTO: 29.5 % (ref 36–48)
HGB BLD-MCNC: 8.7 G/DL (ref 13–16)
HGB UR QL STRIP: NEGATIVE
HYALINE CASTS URNS QL MICRO: ABNORMAL /LPF (ref 0–2)
IMM GRANULOCYTES # BLD AUTO: 0.1 K/UL (ref 0–0.04)
IMM GRANULOCYTES NFR BLD AUTO: 1 % (ref 0–0.5)
KETONES UR QL STRIP.AUTO: NEGATIVE MG/DL
LEUKOCYTE ESTERASE UR QL STRIP.AUTO: NEGATIVE
LYMPHOCYTES # BLD: 0.9 K/UL (ref 0.9–3.6)
LYMPHOCYTES NFR BLD: 6 % (ref 21–52)
MCH RBC QN AUTO: 24.4 PG (ref 24–34)
MCHC RBC AUTO-ENTMCNC: 29.5 G/DL (ref 31–37)
MCV RBC AUTO: 82.9 FL (ref 78–100)
MONOCYTES # BLD: 1 K/UL (ref 0.05–1.2)
MONOCYTES NFR BLD: 7 % (ref 3–10)
MUCOUS THREADS URNS QL MICRO: ABNORMAL /LPF
NEUTS SEG # BLD: 13.1 K/UL (ref 1.8–8)
NEUTS SEG NFR BLD: 86 % (ref 40–73)
NITRITE UR QL STRIP.AUTO: NEGATIVE
NRBC # BLD: 0 K/UL (ref 0–0.01)
NRBC BLD-RTO: 0 PER 100 WBC
PH UR STRIP: 5 (ref 5–8)
PLATELET # BLD AUTO: 290 K/UL (ref 135–420)
PMV BLD AUTO: 10.2 FL (ref 9.2–11.8)
POTASSIUM SERPL-SCNC: 4.8 MMOL/L (ref 3.5–5.5)
PROT SERPL-MCNC: 6.8 G/DL (ref 6.4–8.2)
PROT UR STRIP-MCNC: 100 MG/DL
RBC # BLD AUTO: 3.56 M/UL (ref 4.35–5.65)
RBC #/AREA URNS HPF: ABNORMAL /HPF (ref 0–5)
SODIUM SERPL-SCNC: 142 MMOL/L (ref 136–145)
SP GR UR REFRACTOMETRY: 1.02 (ref 1–1.03)
TROPONIN I SERPL HS-MCNC: 17 NG/L (ref 0–78)
UROBILINOGEN UR QL STRIP.AUTO: 0.2 EU/DL (ref 0.2–1)
WBC # BLD AUTO: 15.2 K/UL (ref 4.6–13.2)
WBC URNS QL MICRO: ABNORMAL /HPF (ref 0–4)

## 2023-06-22 PROCEDURE — 73700 CT LOWER EXTREMITY W/O DYE: CPT

## 2023-06-22 PROCEDURE — 93010 ELECTROCARDIOGRAM REPORT: CPT | Performed by: INTERNAL MEDICINE

## 2023-06-22 PROCEDURE — 99284 EMERGENCY DEPT VISIT MOD MDM: CPT

## 2023-06-22 PROCEDURE — 80053 COMPREHEN METABOLIC PANEL: CPT

## 2023-06-22 PROCEDURE — 72125 CT NECK SPINE W/O DYE: CPT

## 2023-06-22 PROCEDURE — 85025 COMPLETE CBC W/AUTO DIFF WBC: CPT

## 2023-06-22 PROCEDURE — 81001 URINALYSIS AUTO W/SCOPE: CPT

## 2023-06-22 PROCEDURE — 70450 CT HEAD/BRAIN W/O DYE: CPT

## 2023-06-22 PROCEDURE — 84484 ASSAY OF TROPONIN QUANT: CPT

## 2023-06-22 PROCEDURE — 70486 CT MAXILLOFACIAL W/O DYE: CPT

## 2023-06-22 PROCEDURE — 93005 ELECTROCARDIOGRAM TRACING: CPT | Performed by: EMERGENCY MEDICINE

## 2023-06-22 RX ORDER — GINSENG 100 MG
1 CAPSULE ORAL 3 TIMES DAILY
Status: DISCONTINUED | OUTPATIENT
Start: 2023-06-22 | End: 2023-06-22 | Stop reason: HOSPADM

## 2023-06-22 RX ORDER — IBUPROFEN 200 MG
TABLET ORAL
Qty: 453.9 G | Refills: 1 | Status: SHIPPED | OUTPATIENT
Start: 2023-06-22

## 2023-06-22 ASSESSMENT — LIFESTYLE VARIABLES
HOW MANY STANDARD DRINKS CONTAINING ALCOHOL DO YOU HAVE ON A TYPICAL DAY: PATIENT DOES NOT DRINK
HOW OFTEN DO YOU HAVE A DRINK CONTAINING ALCOHOL: NEVER

## 2023-06-22 ASSESSMENT — PAIN SCALES - GENERAL
PAINLEVEL_OUTOF10: 1
PAINLEVEL_OUTOF10: 1

## 2023-06-22 ASSESSMENT — ENCOUNTER SYMPTOMS
ALLERGIC/IMMUNOLOGIC NEGATIVE: 1
SHORTNESS OF BREATH: 0
GASTROINTESTINAL NEGATIVE: 1

## 2023-06-22 ASSESSMENT — PAIN - FUNCTIONAL ASSESSMENT
PAIN_FUNCTIONAL_ASSESSMENT: 0-10
PAIN_FUNCTIONAL_ASSESSMENT: 0-10

## 2023-06-22 ASSESSMENT — PAIN DESCRIPTION - ORIENTATION: ORIENTATION: RIGHT;LEFT

## 2023-06-22 ASSESSMENT — PAIN DESCRIPTION - LOCATION
LOCATION: FACE;ARM
LOCATION: GENERALIZED

## 2023-06-22 NOTE — ED TRIAGE NOTES
EMS notified for fall. Patient states he was outside in heat and got overheated. Was headed up the incline driveway and lost balance. Queta Red on hands and left side of face. Denies LOC. Skin tears to bilateral arms. Abrasion to left side of face.   Denies neck pain

## 2023-06-22 NOTE — ED PROVIDER NOTES
[facility] EMERGENCY DEPT  EMERGENCY DEPARTMENT ENCOUNTER      Pt Name: Coral Morin  MRN: [id number]  [place] 1955  Date of evaluation: 6/22/2023  Provider: NARINDER Barboza    CHIEF COMPLAINT       Chief Complaint   Patient presents with    Fall         HISTORY OF PRESENT ILLNESS   (Location/Symptom, Timing/Onset, Context/Setting, Quality, Duration, Modifying Factors, Severity)  Note limiting factors. Coral Morin is a 70 y.o. male who presents to the emergency department complaint of trip and fall today. He had been outside walking and felt overheated. He fell in the driveway without syncope on aspirin multiple skin tears to left side of face facial swelling both hands. Additionally, patient has a \"hematoma\" on his left anterior lower leg for 3 weeks. Was on antibiotics initially but [facility] yesterday diagnosed him as a hematoma and not needed. Tetanus is up-to-date. HPI    Nursing Notes were reviewed. REVIEW OF SYSTEMS    (2-9 systems for level 4, 10 or more for level 5)     Review of Systems   Constitutional:  Positive for fatigue. HENT: Negative. Eyes:  Negative for visual disturbance. Respiratory:  Negative for shortness of breath. Cardiovascular:  Negative for chest pain. Gastrointestinal: Negative. Endocrine: Negative. Genitourinary: Negative. Musculoskeletal: Negative. Skin:  Positive for wound. Allergic/Immunologic: Negative. Neurological:  Positive for light-headedness. Hematological:  Bruises/bleeds easily. Psychiatric/Behavioral: Negative. Except as noted above the remainder of the review of systems was reviewed and negative.        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

## 2023-08-31 NOTE — PROGRESS NOTES
[facility] PHYSICAL REHABILITATION  [address] [place] [address], [address]     INPATIENT REHABILITATION  DAILY PROGRESS NOTE     Date: 8/22/2019    Name: Krystal Kumar Age / Sex: 71 y.o. / male   CSN: [id number] MRN: [id number]   [facility] Date: 8/15/2019 Length of Stay: 7 days     Primary Rehab Diagnosis: Impaired Mobility and ADLs secondary to Acute Ischemic Stroke (acute/early subacute infarct at the left posterior basal ganglia to corona radiata) with residual right hemiparesis, dysphagia and dysarthria      Subjective:     Patient seen and examined. Blood pressure better controlled. Blood glucose controlled. (+) stiffness in the RUE     Patient's Complaint:   Still with nonpainful swelling of the right ankle    Pain Control: no current joint or muscle symptoms, essentially pain-free      Objective:     Vital Signs:  Patient Vitals for the past 24 hrs:   BP Temp Pulse Resp SpO2   08/22/19 0728 155/77 97.6 °F (36.4 °C) 71 18 98 %   08/21/19 2100 162/75 98 °F (36.7 °C) 75 18 97 %   08/21/19 1330 162/74 97.8 °F (36.6 °C) 71 18 98 %        Physical Examination:  GENERAL SURVEY: Patient is awake, alert, oriented x 3, sitting comfortably on the chair, not in acute respiratory distress. HEENT: pink palpebral conjunctivae, anicteric sclerae, no nasoaural discharge, moist oral mucosa  NECK: supple, no jugular venous distention, no palpable lymph nodes  CHEST/LUNGS: symmetrical chest expansion, good air entry, clear breath sounds  HEART: adynamic precordium, good S1 S2, no S3, regular rhythm, no murmurs  ABDOMEN: obese, bowel sounds appreciated, soft, non-tender  EXTREMITIES: pink nailbeds, no edema except for right ankle swelling, full and equal pulses, no calf tenderness   NEUROLOGICAL EXAM: The patient is awake, alert and oriented x3, able to answer questions fairly appropriately, able to follow 1 and 2 step commands. Able to tell time from the wall clock.   Cranial nerves II-XII are grossly intact except for slightly shallow right nasolabial fold, dysarthria and dysphagia. No gross sensory deficit. Motor strength is 5/5 on the LUE and LLE, 0/5 on the RUE (except for 1/5 on the right shoulder), 2 to 2+/5 on RLE (except for 0/5 on the right ankle/foot).       Current Medications:  Current Facility-Administered Medications   Medication Dose Route Frequency    ferrous sulfate tablet 325 mg  1 Tab Oral DAILY WITH BREAKFAST    ascorbic acid (vitamin C) (VITAMIN C) tablet 250 mg  250 mg Oral DAILY WITH BREAKFAST    cyanocobalamin tablet 1,000 mcg  1,000 mcg Oral DAILY    cholecalciferol (VITAMIN D3) capsule 5,000 Units  5,000 Units Oral DAILY    labetalol (NORMODYNE) tablet 400 mg  400 mg Oral Q12H    isosorbide dinitrate (ISORDIL) tablet 30 mg  30 mg Oral Q8H    hydrALAZINE (APRESOLINE) tablet 25 mg  25 mg Oral TID PRN    acetaminophen (TYLENOL) tablet 650 mg  650 mg Oral Q4H PRN    bisacodyl (DULCOLAX) tablet 10 mg  10 mg Oral Q48H PRN    heparin (porcine) injection 5,000 Units  5,000 Units SubCUTAneous Q8H    insulin lispro (HUMALOG) injection   SubCUTAneous TIDAC    magnesium oxide (MAG-OX) tablet 400 mg  400 mg Oral DAILY    aspirin chewable tablet 81 mg  81 mg Oral DAILY WITH BREAKFAST    amLODIPine (NORVASC) tablet 10 mg  10 mg Oral DAILY    atorvastatin (LIPITOR) tablet 80 mg  80 mg Oral QHS    hydrALAZINE (APRESOLINE) tablet 75 mg  75 mg Oral Q8H       Allergies:  No Known Allergies      Functional Progress:    SPEECH AND LANGUAGE PATHOLOGY    ON ADMISSION MOST RECENT   Comprehension (Native Language)  Primary Mode of Comprehension: Auditory  Score: 4 Comprehension (Native Language)  Primary Mode of Comprehension: Auditory  Score: 5     Expression (Native Language)  Primary Mode of Expression: Verbal  Score: 4  Comments: Pt requiring increased time and repetitions to make needs known 2/2 to facial droop impeding expression    Expression (Native Language)  Primary Mode of Expression: Verbal  Score: 4  Comments: Pt requiring increased time and repetitions to make needs known 2/2 to facial droop impeding expression      Social Interaction/Pragmatics  Score: 4  Comments: Pt interacting appropriately with OT  Social Interaction/Pragmatics  Score: 6  Comments: Pt interacting appropriately with OT      Problem Solving  Score: 4  Comments: Pt requiring Michael for problem solving body mechanics prior to toileting transfer    Problem Solving  Score: 4  Comments: Pt requiring Michael for problem solving body mechanics prior to toileting transfer      Memory  Score: 4 Memory  Score: 5       Legend:   7 - Independent   6 - Modified Independent   5 - Standby Assistance / Supervision / Set-up   4 - Minimum Assistance / Contact Guard Assistance   3 - Moderate Assistance   2 - Maximum Assistance   1 - Total Assistance / Dependent       Lab/Data Review:  Recent Results (from the past 24 hour(s))   GLUCOSE, POC    Collection Time: 08/21/19  4:58 PM   Result Value Ref Range    Glucose (POC) 123 (H) 70 - 110 mg/dL   GLUCOSE, POC    Collection Time: 08/21/19 10:09 PM   Result Value Ref Range    Glucose (POC) 106 70 - 882 mg/dL   METABOLIC PANEL, BASIC    Collection Time: 08/22/19  6:04 AM   Result Value Ref Range    Sodium 141 136 - 145 mmol/L    Potassium 3.6 3.5 - 5.5 mmol/L    Chloride 108 100 - 111 mmol/L    CO2 25 21 - 32 mmol/L    Anion gap 8 3.0 - 18 mmol/L    Glucose 94 74 - 99 mg/dL    BUN 35 (H) 7.0 - 18 MG/DL    Creatinine 1.33 (H) 0.6 - 1.3 MG/DL    BUN/Creatinine ratio 26 (H) 12 - 20      GFR est AA >60 >60 ml/min/1.73m2    GFR est non-AA 54 (L) >60 ml/min/1.73m2    Calcium 8.8 8.5 - 10.1 MG/DL   HGB & HCT    Collection Time: 08/22/19  6:04 AM   Result Value Ref Range    HGB 8.6 (L) 13.0 - 16.0 g/dL    HCT 26.3 (L) 36.0 - 48.0 %   PLATELET COUNT    Collection Time: 08/22/19  6:04 AM   Result Value Ref Range    PLATELET 336 051 - 033 K/uL   MAGNESIUM    Collection Time: 08/22/19  6:04 AM   Result Value Ref Range Magnesium 2.2 1.6 - 2.6 mg/dL   GLUCOSE, POC    Collection Time: 08/22/19  7:25 AM   Result Value Ref Range    Glucose (POC) 119 (H) 70 - 110 mg/dL   GLUCOSE, POC    Collection Time: 08/22/19 11:32 AM   Result Value Ref Range    Glucose (POC) 140 (H) 70 - 110 mg/dL       Estimated Glomerular Filtration Rate:  On admission, estimated GFR based on a Creatinine of 1.20 mg/dl:              Using CKD-EPI = 63.1 mL/min/1.73m2              Using MDRD = 64.6 mL/min/1.73m2  Most recent estimated GFR, based on a Creatinine of 1.33 mg/dl on 8/22/2019:   Using CKD-EPI = 55.7 mL/min/1.73m2   Using MDRD = 57.4 mL/min/1.73m2       Assessment:     Primary Rehabilitation Diagnosis  1. Impaired Mobility and ADLs  2. Acute Ischemic Stroke (acute/early subacute infarct at the left posterior basal ganglia to corona radiata) with residual right hemiparesis, dysphagia and dysarthria     Comorbidities   Obesity, Class I, BMI 30-34.9 E66.9    Obstructive sleep apnea on CPAP G47.33, Z99.89    Hypertensive heart and kidney disease without heart failure and with stage 2 chronic kidney disease I13.10, N18.2    Chronic hypokalemia E87.6    CKD (chronic kidney disease) stage 2, GFR 60-89 ml/min N18.2    Current use of aspirin Z79.82    On statin therapy due to risk of future cardiovascular event Z79.899    Type 2 diabetes mellitus with stage 2 chronic kidney disease  E11.22, N18.2    Pure hypercholesterolemia E78.00    Elevated prostate specific antigen (PSA) R97.20    Refusal of statin medication by patient Z48.34    First degree atrioventricular block by electrocardiogram I44.0    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 20X0    Leukocytosis D72.829    Iron deficiency anemia D50.9    Vitamin B12 anemia D51.9    Vitamin D deficiency E55. 9        Plan:     1. Justification for continued stay: Good progression towards established rehabilitation goals.     2. Medical Issues being followed closely:    [x]  Fall and safety precautions     []  Wound Care     [x]  Bowel and Bladder Function     [x]  Fluid Electrolyte and Nutrition Balance     []  Pain Control      3. Issues that 24 hour rehabilitation nursing is following:    [x]  Fall and safety precautions     []  Wound Care     [x]  Bowel and Bladder Function     [x]  Fluid Electrolyte and Nutrition Balance     []  Pain Control      [x]  Assistance with and education on in-room safety with transfers to and from the bed, wheelchair, toilet and shower. 4. Acute rehabilitation plan of care:    [x]  Continue current care and rehab. [x]  Physical Therapy           [x]  Occupational Therapy           [x]  Speech Therapy     []  Hold Rehab until further notice     5. Medications:    [x]  MAR Reviewed     [x]  Continue Present Medications     6. DVT Prophylaxis:      []  Lovenox     [x]  Unfractionated Heparin     []  Coumadin     []  NOAC     []  SUZAN Stockings     []  Sequential Compression Device     []  None     7.  Orders:   > Acute Ischemic Stroke (acute/early subacute infarct at the left posterior basal ganglia to corona radiata) with residual right hemiparesis, dysphagia and dysarthria; S/P Administration of intravenous tPA (8/12/2019 - [address])   > Start Baclofen 5 mg PO TID   > Continue:    > Aspirin 81 mg PO once daily with breakfast    > Atorvastatin 80 mg PO q HS    > Chronic hypokalemia    Serum Potassium during hospital stay at [address]      08/15/19  0330 08/14/19  1848 08/14/19  0342 08/13/19  1205 08/12/19  2328   K 3.0* 2.8* 2.7* 2.8* 2.4*          > K (8/16/2019, on admission) = 3.8   > Mg (8/16/2019, on admission) = 1.9   > Upon admission to the ARU, patient was given Klor Con 40 meq PO BID with meals x 2 days   > K (8/17/2019) = 3.5   > K (8/18/2019) = 3.7   > K (8/19/2019) = 3.5   > K (8/22/2019) = 3.6   > Mg (8/22/2019) = 2.2   > Continue Mg(OH)2 400 mg PO once daily    > Hypertensive heart and kidney disease without heart failure and with stage 2 chronic kidney disease   > Upon admission to the ARU, increased Hydralazine from 50 mg to 75 mg PO q 8 hr (6AM, 2PM, 10PM)   > Once work-up for primary hyperaldosteronism is complete, plan to start Spironolactone 25 mg PO once daily   > On 8/16/2019:    > Discontinued Metoprolol succinate 50 mg PO once daily (6AM)    > Started:     > Labetalol 200 mg PO q 12 hr (9AM, 9PM)     > Isosorbide dinitrate 10 mg P) q 8 hr (6AM, 2PM, 10PM)   > On 8/18/2019, added Hydralazine 25 mg PO TID PRN for SBP greater than 170 mmHg   > On 8/19/2019:    > Increased Labetalol from 200 mg to 300 mg PO q 12 hr (9AM, 9PM)    > Increased Isosorbide dinitrate from 10 mg to 20 mg PO q 8 hr (6AM, 2PM, 10PM)   > On 8/20/2019:    > Increased Labetalol from 300 mg to 400 mg PO q 12 hr (9AM, 9PM)    > Increased Isosorbide dinitrate from 20 mg to 30 mg PO q 8 hr (6AM, 2PM, 10PM)   > Continue:    > Amlodipine 10 mg PO once daily (9AM)    > Hydralazine 75 mg PO q 8 hr (6AM, 2PM, 10PM)    > Increase Labetalol from 400 mg to 600 mg PO q 12 hr (9AM, 9PM)    > Isosorbide dinitrate 30 mg PO q 8 hr (6AM, 2PM, 10PM)    > Hydralazine 25 mg PO TID PRN for SBP greater than 170 mmHg    > Pure hypercholesterolemia   > Lipid profile (8/13/2019) showed , , HDL 42,    > Lipid profile (8/14/2019) showed , , HDL 39, LDL 94   > Continue Atorvastatin 80 mg PO q HS    > Type 2 diabetes mellitus with stage 2 chronic kidney disease   > HbA1c (4/16/2014) = 6.2   > Patient has had chronic kidney disease even before his diagnosis of Type 2 diabetes mellitus and is presumed to be due to prolonged uncontrolled hypertension   > HbA1c (8/13/2019) = 6.6   > Continue Humalog insulin sliding scale SC TID AC    >  ? Primary hyperaldosteronism as etiology of chronic hypokalemia and difficult to control hypertension   > Endocrinology consult (Dr. Ira Artis) was called at [facility] [address] prior to discharge/transfer to the ARU   > Aldosterone/Renin activity (8/16/2019):    > Aldosterone = 8.8 (NORMAL)    > Renin Activity = 0.300 (NORMAL)    > Aldosterone/Renin Activity = 29 (ELEVATED)   > Once work-up for primary hyperaldosteronism is complete, plan to start Spironolactone 25 mg PO once daily   > Endocrinology consult (Dr. Frederick Nye):    > ASSESSMENT:     1. Chronic hypokalemia. 2.  Diabetes mellitus. 3.  Diabetic nephropathy with a stage II chronic renal disease. 4.  Normocytic anemia. 5.  Vitamin B12 deficiency. 6.  Borderline iron deficiency. 7.  Hypoalbuminemia.     > DISCUSSION:  The patient may well have hyperaldosteronism, but he may have it on the basis of his nephropathy, which is due to his underlying diabetes. The cause of his anemia might be a combination of B12 deficiency and iron deficiency together, so investigation should be done for this.     > PLAN: Laboratory studies:     1.  24-hour urine for aldosteronism: PENDING     2. Methylmalonic acid level (8/18/2019) = 468 (ELEVATED)     3.  Gastrin level (8/20/2019) = <10 (NORMAL)     4. Microalbumin-to-creatinine ratio. 5.  Transferrin and prealbumin levels. > FSH (8/19/2019) = 5.6 (NORMAL)    > LH (8/18/2019) = 6.8 (NORMAL)    > Free T4 (8/18/2019) = 1.0 (NORMAL)    > TSH (8/18/2019) = 4.63 (ELEVATED)    > Urine aldosterone (8/20/2019): PENDING    > Leukocytosis, resolved   > WBC count (8/15/2019) = 11.7   > No fever documented since admission to the ARU   > WBC count (8/16/2019, on admission) = 17.9   > Work-up:    > Urinalysis (8/16/2019): WNL    > Chest x-ray (PA-Lateral) (8/16/2019) showed a minimally blunted left posterior costophrenic angle could be due to either a tiny effusion or chronic pleural reaction/scarring; mild cardiomegaly; atherosclerosis.    > WBC count (8/18/2019) = 11.6    > Iron deficiency anemia / Vitamin B12 anemia   > Anemia work-up (8/14/2019) showed serum iron 22, TIBC 371, iron % saturation 6, ferritin 34, reticulocyte count 1.4   > Vitamin B12 (8/14/2019) = 208   > Hgb/Hct (8/15/2019) = 10.1/31.3    > Hgb/Hct (8/16/2019, on admission) = 10.7/32.6   > Hgb/Hct (8/18/2019) = 8.7/27.4   > Hgb/Hct (8/19/2019) = 8.4/25.2   > On 8/19/2019, started:     > FeSO4 325 mg PO once daily with breakfast     > Ascorbic Acid 250 mg PO once daily with breakfast (to enhance the absorption of the FeSO4)     > Cyanocobalamin 1,000 mcg PO once daily    > Epoetin tone 10,000 units SC x 1 dose   > Hgb/Hct (8/22/2019) = 8.6/26.3   > Epoetin tone 10,000 units SC x 1 dose today    > Continue:    > FeSO4 325 mg PO once daily with breakfast     > Ascorbic Acid 250 mg PO once daily with breakfast (to enhance the absorption of the FeSO4)     > Cyanocobalamin 1,000 mcg PO once daily    > Vitamin D Deficiency   > PTH (8/18/2019) = 101.7   > Vitamin D 25-Hydroxy (8/19/2019) = 16.2   > On 8/19/2019, patient was given Cholecalciferol 50,000 units PO x 1 dose    > On 8/20/2019, started Cholecalciferol 5,000 units PO once daily   > Continue Cholecalciferol 5,000 units PO once daily    > Right ankle swelling   > (+) nonpainful swelling of the right ankle for the past few days; denies any trauma; most likely dependent and due to hemiparesis/lack of muscle contraction   > X-ray of the right ankle    > Diet:   > On 8/16/2019, solids consistency was advanced from Mechanical soft (NDD 2) to Soft solids (NDD 3)   > Specifications: Diabetic, low saturated fat   > Solids (consistency): Soft solids (NDD 3)   > Liquids (consistency): Mildly thick ([place]-thick)       8. Patient's progress in rehabilitation and medical issues discussed with the patient. All questions answered to the best of my ability. Care plan discussed with patient, wife and nurse.       Signed:    Barron Shields MD    August 22, 2019 Improved

## 2023-11-02 ENCOUNTER — HOSPITAL ENCOUNTER (OUTPATIENT)
Facility: HOSPITAL | Age: 68
Setting detail: OBSERVATION
Discharge: HOME | End: 2023-11-03
Attending: INTERNAL MEDICINE | Admitting: FAMILY MEDICINE
Payer: MEDICARE

## 2023-11-02 DIAGNOSIS — I48.91 ATRIAL FIBRILLATION WITH SLOW VENTRICULAR RESPONSE (HCC): Primary | ICD-10-CM

## 2023-11-02 DIAGNOSIS — R80.1 PERSISTENT PROTEINURIA: ICD-10-CM

## 2023-11-02 DIAGNOSIS — N18.32 CHRONIC KIDNEY DISEASE (CKD) STAGE G3B/A1, MODERATELY DECREASED GLOMERULAR FILTRATION RATE (GFR) BETWEEN 30-44 ML/MIN/1.73 SQUARE METER AND ALBUMINURIA CREATININE RATIO LESS THAN 30 MG/G (HCC): ICD-10-CM

## 2023-11-02 PROBLEM — R80.9 PROTEINURIA: Status: ACTIVE | Noted: 2023-11-02

## 2023-11-02 LAB
ANION GAP SERPL CALC-SCNC: 6 MMOL/L (ref 3–18)
APTT PPP: 29.2 SEC (ref 23–36.4)
BASOPHILS # BLD: 0.1 K/UL (ref 0–0.1)
BASOPHILS NFR BLD: 1 % (ref 0–2)
BUN SERPL-MCNC: 29 MG/DL (ref 7–18)
BUN/CREAT SERPL: 18 (ref 12–20)
CALCIUM SERPL-MCNC: 8.7 MG/DL (ref 8.5–10.1)
CHLORIDE SERPL-SCNC: 113 MMOL/L (ref 100–111)
CO2 SERPL-SCNC: 24 MMOL/L (ref 21–32)
CREAT SERPL-MCNC: 1.6 MG/DL (ref 0.6–1.3)
DIFFERENTIAL METHOD BLD: ABNORMAL
EKG ATRIAL RATE: 40 BPM
EKG DIAGNOSIS: NORMAL
EKG Q-T INTERVAL: 530 MS
EKG QRS DURATION: 102 MS
EKG QTC CALCULATION (BAZETT): 416 MS
EKG R AXIS: 14 DEGREES
EKG T AXIS: 56 DEGREES
EKG VENTRICULAR RATE: 37 BPM
EOSINOPHIL # BLD: 0.2 K/UL (ref 0–0.4)
EOSINOPHIL NFR BLD: 2 % (ref 0–5)
ERYTHROCYTE [DISTWIDTH] IN BLOOD BY AUTOMATED COUNT: 15.2 % (ref 11.6–14.5)
GLUCOSE SERPL-MCNC: 106 MG/DL (ref 74–99)
HCT VFR BLD AUTO: 34.9 % (ref 36–48)
HCT VFR BLD AUTO: 35.7 % (ref 36–48)
HGB BLD-MCNC: 11 G/DL (ref 13–16)
HGB BLD-MCNC: 11.3 G/DL (ref 13–16)
IMM GRANULOCYTES # BLD AUTO: 0 K/UL (ref 0–0.04)
IMM GRANULOCYTES NFR BLD AUTO: 1 % (ref 0–0.5)
INR PPP: 1.1 (ref 0.9–1.1)
LYMPHOCYTES # BLD: 1 K/UL (ref 0.9–3.6)
LYMPHOCYTES NFR BLD: 11 % (ref 21–52)
MCH RBC QN AUTO: 26.2 PG (ref 24–34)
MCHC RBC AUTO-ENTMCNC: 31.7 G/DL (ref 31–37)
MCV RBC AUTO: 82.8 FL (ref 78–100)
MONOCYTES # BLD: 0.7 K/UL (ref 0.05–1.2)
MONOCYTES NFR BLD: 8 % (ref 3–10)
NEUTS SEG # BLD: 6.8 K/UL (ref 1.8–8)
NEUTS SEG NFR BLD: 78 % (ref 40–73)
NRBC # BLD: 0 K/UL (ref 0–0.01)
NRBC BLD-RTO: 0 PER 100 WBC
PLATELET # BLD AUTO: 278 K/UL (ref 135–420)
PMV BLD AUTO: 10.5 FL (ref 9.2–11.8)
POTASSIUM SERPL-SCNC: 4 MMOL/L (ref 3.5–5.5)
PROTHROMBIN TIME: 14.3 SEC (ref 11.9–14.7)
RBC # BLD AUTO: 4.31 M/UL (ref 4.35–5.65)
SODIUM SERPL-SCNC: 143 MMOL/L (ref 136–145)
T4 FREE SERPL-MCNC: 1.3 NG/DL (ref 0.7–1.5)
TSH SERPL DL<=0.05 MIU/L-ACNC: 1.9 UIU/ML (ref 0.36–3.74)
WBC # BLD AUTO: 8.7 K/UL (ref 4.6–13.2)

## 2023-11-02 PROCEDURE — 85730 THROMBOPLASTIN TIME PARTIAL: CPT

## 2023-11-02 PROCEDURE — 6360000002 HC RX W HCPCS: Performed by: RADIOLOGY

## 2023-11-02 PROCEDURE — 2580000003 HC RX 258: Performed by: FAMILY MEDICINE

## 2023-11-02 PROCEDURE — 88313 SPECIAL STAINS GROUP 2: CPT

## 2023-11-02 PROCEDURE — 88350 IMFLUOR EA ADDL 1ANTB STN PX: CPT

## 2023-11-02 PROCEDURE — 85025 COMPLETE CBC W/AUTO DIFF WBC: CPT

## 2023-11-02 PROCEDURE — 84439 ASSAY OF FREE THYROXINE: CPT

## 2023-11-02 PROCEDURE — G0378 HOSPITAL OBSERVATION PER HR: HCPCS

## 2023-11-02 PROCEDURE — 6370000000 HC RX 637 (ALT 250 FOR IP): Performed by: FAMILY MEDICINE

## 2023-11-02 PROCEDURE — 85014 HEMATOCRIT: CPT

## 2023-11-02 PROCEDURE — 85610 PROTHROMBIN TIME: CPT

## 2023-11-02 PROCEDURE — 85018 HEMOGLOBIN: CPT

## 2023-11-02 PROCEDURE — 88305 TISSUE EXAM BY PATHOLOGIST: CPT

## 2023-11-02 PROCEDURE — 88346 IMFLUOR 1ST 1ANTB STAIN PX: CPT

## 2023-11-02 PROCEDURE — 2500000003 HC RX 250 WO HCPCS: Performed by: RADIOLOGY

## 2023-11-02 PROCEDURE — 2580000003 HC RX 258: Performed by: RADIOLOGY

## 2023-11-02 PROCEDURE — 6370000000 HC RX 637 (ALT 250 FOR IP): Performed by: RADIOLOGY

## 2023-11-02 PROCEDURE — 36415 COLL VENOUS BLD VENIPUNCTURE: CPT

## 2023-11-02 PROCEDURE — 93010 ELECTROCARDIOGRAM REPORT: CPT | Performed by: INTERNAL MEDICINE

## 2023-11-02 PROCEDURE — 88348 ELECTRON MICROSCOPY DX: CPT

## 2023-11-02 PROCEDURE — 50200 RENAL BIOPSY PERQ: CPT

## 2023-11-02 PROCEDURE — 93005 ELECTROCARDIOGRAM TRACING: CPT | Performed by: PHYSICIAN ASSISTANT

## 2023-11-02 PROCEDURE — 80048 BASIC METABOLIC PNL TOTAL CA: CPT

## 2023-11-02 PROCEDURE — 84443 ASSAY THYROID STIM HORMONE: CPT

## 2023-11-02 PROCEDURE — 99223 1ST HOSP IP/OBS HIGH 75: CPT | Performed by: FAMILY MEDICINE

## 2023-11-02 RX ORDER — ONDANSETRON 4 MG/1
4 TABLET, ORALLY DISINTEGRATING ORAL EVERY 8 HOURS PRN
Status: DISCONTINUED | OUTPATIENT
Start: 2023-11-02 | End: 2023-11-03 | Stop reason: HOSPADM

## 2023-11-02 RX ORDER — ONDANSETRON 2 MG/ML
4 INJECTION INTRAMUSCULAR; INTRAVENOUS EVERY 6 HOURS PRN
Status: DISCONTINUED | OUTPATIENT
Start: 2023-11-02 | End: 2023-11-03 | Stop reason: HOSPADM

## 2023-11-02 RX ORDER — FENTANYL CITRATE 50 UG/ML
INJECTION, SOLUTION INTRAMUSCULAR; INTRAVENOUS
Status: DISCONTINUED
Start: 2023-11-02 | End: 2023-11-02 | Stop reason: WASHOUT

## 2023-11-02 RX ORDER — ASPIRIN 81 MG/1
81 TABLET, CHEWABLE ORAL
Status: DISCONTINUED | OUTPATIENT
Start: 2023-11-03 | End: 2023-11-03 | Stop reason: HOSPADM

## 2023-11-02 RX ORDER — POLYETHYLENE GLYCOL 3350 17 G/17G
17 POWDER, FOR SOLUTION ORAL DAILY PRN
Status: DISCONTINUED | OUTPATIENT
Start: 2023-11-02 | End: 2023-11-03 | Stop reason: HOSPADM

## 2023-11-02 RX ORDER — SODIUM CHLORIDE 0.9 % (FLUSH) 0.9 %
5-40 SYRINGE (ML) INJECTION EVERY 12 HOURS SCHEDULED
Status: DISCONTINUED | OUTPATIENT
Start: 2023-11-02 | End: 2023-11-03 | Stop reason: HOSPADM

## 2023-11-02 RX ORDER — MAGNESIUM SULFATE IN WATER 40 MG/ML
2000 INJECTION, SOLUTION INTRAVENOUS PRN
Status: DISCONTINUED | OUTPATIENT
Start: 2023-11-02 | End: 2023-11-03 | Stop reason: HOSPADM

## 2023-11-02 RX ORDER — ACETAMINOPHEN 650 MG/1
650 SUPPOSITORY RECTAL EVERY 6 HOURS PRN
Status: DISCONTINUED | OUTPATIENT
Start: 2023-11-02 | End: 2023-11-03 | Stop reason: HOSPADM

## 2023-11-02 RX ORDER — POTASSIUM CHLORIDE 20 MEQ/1
40 TABLET, EXTENDED RELEASE ORAL PRN
Status: DISCONTINUED | OUTPATIENT
Start: 2023-11-02 | End: 2023-11-03 | Stop reason: HOSPADM

## 2023-11-02 RX ORDER — AMLODIPINE BESYLATE 10 MG/1
10 TABLET ORAL DAILY
Status: DISCONTINUED | OUTPATIENT
Start: 2023-11-03 | End: 2023-11-03 | Stop reason: HOSPADM

## 2023-11-02 RX ORDER — MIDAZOLAM HYDROCHLORIDE 2 MG/2ML
INJECTION, SOLUTION INTRAMUSCULAR; INTRAVENOUS PRN
Status: DISCONTINUED | OUTPATIENT
Start: 2023-11-02 | End: 2023-11-02 | Stop reason: HOSPADM

## 2023-11-02 RX ORDER — POTASSIUM CHLORIDE 7.45 MG/ML
10 INJECTION INTRAVENOUS PRN
Status: DISCONTINUED | OUTPATIENT
Start: 2023-11-02 | End: 2023-11-03 | Stop reason: HOSPADM

## 2023-11-02 RX ORDER — SODIUM CHLORIDE 0.9 % (FLUSH) 0.9 %
5-40 SYRINGE (ML) INJECTION PRN
Status: DISCONTINUED | OUTPATIENT
Start: 2023-11-02 | End: 2023-11-03 | Stop reason: HOSPADM

## 2023-11-02 RX ORDER — FENTANYL CITRATE 50 UG/ML
INJECTION, SOLUTION INTRAMUSCULAR; INTRAVENOUS PRN
Status: COMPLETED | OUTPATIENT
Start: 2023-11-02 | End: 2023-11-02

## 2023-11-02 RX ORDER — ACETAMINOPHEN 325 MG/1
650 TABLET ORAL EVERY 6 HOURS PRN
Status: DISCONTINUED | OUTPATIENT
Start: 2023-11-02 | End: 2023-11-03 | Stop reason: HOSPADM

## 2023-11-02 RX ORDER — FINASTERIDE 5 MG/1
5 TABLET, FILM COATED ORAL DAILY
Status: DISCONTINUED | OUTPATIENT
Start: 2023-11-03 | End: 2023-11-03 | Stop reason: HOSPADM

## 2023-11-02 RX ORDER — LIDOCAINE HYDROCHLORIDE 10 MG/ML
INJECTION, SOLUTION EPIDURAL; INFILTRATION; INTRACAUDAL; PERINEURAL PRN
Status: COMPLETED | OUTPATIENT
Start: 2023-11-02 | End: 2023-11-02

## 2023-11-02 RX ORDER — MIDAZOLAM HYDROCHLORIDE 1 MG/ML
INJECTION INTRAMUSCULAR; INTRAVENOUS
Status: DISCONTINUED
Start: 2023-11-02 | End: 2023-11-02 | Stop reason: WASHOUT

## 2023-11-02 RX ORDER — MIDAZOLAM HYDROCHLORIDE 1 MG/ML
INJECTION INTRAMUSCULAR; INTRAVENOUS
Status: DISPENSED
Start: 2023-11-02 | End: 2023-11-02

## 2023-11-02 RX ORDER — SPIRONOLACTONE 25 MG/1
25 TABLET ORAL 2 TIMES DAILY
Status: DISCONTINUED | OUTPATIENT
Start: 2023-11-02 | End: 2023-11-03 | Stop reason: HOSPADM

## 2023-11-02 RX ORDER — MIDAZOLAM HYDROCHLORIDE 1 MG/ML
INJECTION INTRAMUSCULAR; INTRAVENOUS PRN
Status: COMPLETED | OUTPATIENT
Start: 2023-11-02 | End: 2023-11-02

## 2023-11-02 RX ORDER — SODIUM CHLORIDE 9 MG/ML
INJECTION, SOLUTION INTRAVENOUS CONTINUOUS
Status: DISCONTINUED | OUTPATIENT
Start: 2023-11-02 | End: 2023-11-02

## 2023-11-02 RX ORDER — FERROUS SULFATE 325(65) MG
325 TABLET ORAL
Status: DISCONTINUED | OUTPATIENT
Start: 2023-11-03 | End: 2023-11-03 | Stop reason: HOSPADM

## 2023-11-02 RX ORDER — SODIUM CHLORIDE 9 MG/ML
INJECTION, SOLUTION INTRAVENOUS PRN
Status: DISCONTINUED | OUTPATIENT
Start: 2023-11-02 | End: 2023-11-03 | Stop reason: HOSPADM

## 2023-11-02 RX ADMIN — HYDRALAZINE HYDROCHLORIDE 75 MG: 50 TABLET, FILM COATED ORAL at 16:30

## 2023-11-02 RX ADMIN — HYDRALAZINE HYDROCHLORIDE 75 MG: 50 TABLET, FILM COATED ORAL at 23:53

## 2023-11-02 RX ADMIN — FENTANYL CITRATE 50 MCG: 50 INJECTION, SOLUTION INTRAMUSCULAR; INTRAVENOUS at 15:32

## 2023-11-02 RX ADMIN — SODIUM CHLORIDE: 9 INJECTION, SOLUTION INTRAVENOUS at 07:58

## 2023-11-02 RX ADMIN — MIDAZOLAM HYDROCHLORIDE 1 MG: 2 INJECTION, SOLUTION INTRAMUSCULAR; INTRAVENOUS at 15:32

## 2023-11-02 RX ADMIN — SODIUM CHLORIDE, PRESERVATIVE FREE 10 ML: 5 INJECTION INTRAVENOUS at 20:35

## 2023-11-02 RX ADMIN — LIDOCAINE HYDROCHLORIDE 10 ML: 10 INJECTION, SOLUTION EPIDURAL; INFILTRATION; INTRACAUDAL; PERINEURAL at 15:30

## 2023-11-02 RX ADMIN — MIDAZOLAM HYDROCHLORIDE 1 MG: 2 INJECTION, SOLUTION INTRAMUSCULAR; INTRAVENOUS at 15:22

## 2023-11-02 RX ADMIN — Medication 5000 UNITS: at 15:43

## 2023-11-02 RX ADMIN — FENTANYL CITRATE 50 MCG: 50 INJECTION, SOLUTION INTRAMUSCULAR; INTRAVENOUS at 15:22

## 2023-11-02 RX ADMIN — MIDAZOLAM HYDROCHLORIDE 1 MG: 1 INJECTION, SOLUTION INTRAMUSCULAR; INTRAVENOUS at 10:11

## 2023-11-02 RX ADMIN — SPIRONOLACTONE 25 MG: 25 TABLET ORAL at 20:35

## 2023-11-02 ASSESSMENT — PAIN SCALES - GENERAL
PAINLEVEL_OUTOF10: 0

## 2023-11-02 ASSESSMENT — PAIN - FUNCTIONAL ASSESSMENT: PAIN_FUNCTIONAL_ASSESSMENT: 0-10

## 2023-11-02 NOTE — PROGRESS NOTES
Preprocedure Assessment      Today 11/2/2023     Indication/Symptoms:  Tere Palma is a 70 y.o.male with CKD and proteinuria. Bradycardia with Afib discussed with [facility] - cardiology PA. No interventions are indicated for HR at this time and the patient is OK to proceed with biopsy. The H & P and/or progress notes and any available imaging were reviewed. The risks, indications and possible alternatives to the procedure, including doing nothing, were discussed and informed consent was obtained. Physical Exam:    Mallampati 2 ASA 3   General: A&O x 4, NAD   Heart:   RRR  Lungs:    Normal work of breathing    The patient is an appropriate candidate to undergo the planned procedure and sedation.     NARINDER Maxwell

## 2023-11-02 NOTE — H&P
Hospitalist Admission Note    NAME: Florinda Bailey   :  1955   MRN:  [id number]     Date:  2023     Patient PCP: MICHELLE Calvo NP  ________________________________________________________________________    My assessment of this patient's clinical condition and my plan of care is as follows. Assessment / Plan:  Atrial fibrillation w/slow VR, asymptomatic  Persistent proteinuria s/p CT guided renal biopsy   CKD  HTN  DM 2  Gout   CHIOMA   Cerbovascular disease - history of CVA s/residual deficit  Class 1 obesity, BMI 31.96    Admit to medical bed with telemetry. Monitor post renal biopsy. Resume home meds as appropriate. Anticipate discharge home tomorrow pending IR follow-up and continued clinical stability. Code Status: Full  DVT Prophylaxis: SCDs          Subjective:   REASON FOR ADMISSION: Heart block    HISTORY OF PRESENT ILLNESS:     Florinda Bailey is a 70 y.o.  male who presented to the interventional radiology suite this morning for previously scheduled renal biopsy. Patient has a history of chronic kidney disease in the setting of hypertension and diabetes. Patient was found to bradycardic with HR in 30s-40s on arrival.  EKG showed atrial fibrillation with slow ventricular response which was unchanged from previous studies. Patient remained asymptomatic and had no new complaints. Biopsy procedure has been completed and patient feels well postoperatively - he specifically denies shortness of breath and chest pain. He has been referred for observation admission post renal biopsy.     Past Medical History:   Diagnosis Date    Acute ischemic stroke ([address]) 2019    Acute Ischemic Stroke (acute/early subacute infarct at the left posterior basal ganglia to corona radiata) with residual right hemiparesis, dysphagia and dysarthria    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     On Allopurinol    Chronic hypokalemia     Persistent chronic hypokalemia + hypertension; Clear, no wheezes. Effort nonlabored, no accessory muscle use. Chest wall:  No chest wall tenderness. Chest expansion equal and symmetrical bilaterally. Heart:   Last 2, bradycardic  Abdomen:   Soft, NT/ND. Bowel sounds normoactive. Extremities: Warm, no edema. No evidence for ischemia. Skin:     Not pale. Not Jaundiced  No rashes   Psych:  Mood normal.  Calm, no agitation. Neurologic: Awake and alert, moves extremities spontaneously. _______________________________________________________________________  Care Plan discussed with:    Comments   Patient X    Family      RN X    Care Manager                    Consultant:      _______________________________________________________________________  Expected  Disposition:   Home  X   HH/PT/OT/RN    SNF/LTC    ARU    ________________________________________________________________________    Tests/Procedures:   PREOPERATIVE DIAGNOSIS: Proteinuria. POSTOPERATIVE DIAGNOSIS: Same     INDICATION: YARIEL. Patient is here for random renal biopsy. ATTENDING: Dr. Tamy Orozco M.D.     Marylen Laurence: None. PROCEDURES: CT-guided renal random core biopsy. Left lower renal pole. ANESTHESIA: 1% local lidocaine as well as moderate intravenous sedation with  Versed and fentanyl given and monitored per independently trained interventional  radiology nurse under my direct supervision for 35 minutes. Please see nursing  records for detailed medication dosing. CONTRAST: None. COMPLICATIONS: Scant     DRAIN: No     CATHETER: None. EBL: Minimal.     SPECIMEN: 4 core biopsy specimens of the left lower renal pole were obtained. Specimen was sent to pathology. TOTAL EXAM DLP:  1872.9 mGy*cm. TECHNIQUE: After detailed explanation of risks and benefits of the procedure  verbal and written consent were obtained. Patient was brought to the CT room and  placed prone on the table. Timeout was performed.  view of the abdomen was  obtained.

## 2023-11-02 NOTE — PROCEDURES
RADIOLOGY POST PROCEDURE NOTE     November 2, 2023       4:40 PM     Preoperative Diagnosis:   Proteinuria. Postoperative Diagnosis:  Same. :  Dr. Chase Ratliff    Assistant:  None. Type of Anesthesia: 1% plain lidocaine and IV moderate sedation with Versed and Fentanyl. Procedure/Description: Image guided random renal bx. Findings:   No bleeding. Estimated blood Loss:  Minimal    Specimen Removed:  Yes    Blood transfusions:  None. Implants:  None. Complications: None    Condition: Stable    Discharge Plan:  continue present therapy.      Leighton Nguyen MD

## 2023-11-02 NOTE — PROGRESS NOTES
TRANSFER - OUT REPORT:    Verbal report given to Farzaneh Palmer on Ean Melgoza  being transferred to [facility] for routine progression of patient care       Report consisted of patient's Situation, Background, Assessment and   Recommendations(SBAR). Information from the following report(s) Nurse Handoff Report was reviewed with the receiving nurse. Lines:   Peripheral IV 11/02/23 Left Antecubital (Active)        Opportunity for questions and clarification was provided.       Patient transported with:

## 2023-11-02 NOTE — PERIOP NOTE
Patient Nate Hawthorne has been informed that [facility] [place] is not responsible for patient belongings per policy and the signed [id number] [facility] Patient Agreement document. Personal items should be sent home or checked in with security. Patient Nate Hawthorne selected the following action:                            [x]  Send personal items home with a family member or friend                                                 []  Check in personal items with security, excluding clothing                            []  Maintain personal items at the bedside, against recommendation                                 by [facility]                                   ** If patient Haile Mccollum chooses to maintain personal items at the bedside,                                      Complete the patient belongings inventory in the EMR. Belongings are with daughter, Stefano Erazo.   Contact number: [phone]

## 2023-11-02 NOTE — CARE COORDINATION
Attempted CM assessment but patient not in room. Nursing reported patient taken to CT.     Domenico Gonzalez, RN  Care Management

## 2023-11-03 ENCOUNTER — APPOINTMENT (OUTPATIENT)
Facility: HOSPITAL | Age: 68
End: 2023-11-03
Attending: INTERNAL MEDICINE
Payer: MEDICARE

## 2023-11-03 VITALS
DIASTOLIC BLOOD PRESSURE: 87 MMHG | HEART RATE: 50 BPM | SYSTOLIC BLOOD PRESSURE: 197 MMHG | WEIGHT: 216 LBS | TEMPERATURE: 97.4 F | RESPIRATION RATE: 18 BRPM | OXYGEN SATURATION: 98 % | HEIGHT: 69 IN | BODY MASS INDEX: 31.99 KG/M2

## 2023-11-03 PROBLEM — I48.91 ATRIAL FIBRILLATION WITH SLOW VENTRICULAR RESPONSE (HCC): Status: ACTIVE | Noted: 2023-11-03

## 2023-11-03 LAB
ECHO AO ASC DIAM: 3.9 CM
ECHO AO ASCENDING AORTA INDEX: 1.83 CM/M2
ECHO AO ROOT DIAM: 3.4 CM
ECHO AO ROOT INDEX: 1.6 CM/M2
ECHO AV AREA PEAK VELOCITY: 2 CM2
ECHO AV AREA VTI: 2 CM2
ECHO AV AREA/BSA PEAK VELOCITY: 0.9 CM2/M2
ECHO AV AREA/BSA VTI: 0.9 CM2/M2
ECHO AV MEAN GRADIENT: 9 MMHG
ECHO AV MEAN VELOCITY: 1.5 M/S
ECHO AV PEAK GRADIENT: 18 MMHG
ECHO AV PEAK VELOCITY: 2.2 M/S
ECHO AV VELOCITY RATIO: 0.5
ECHO AV VTI: 47.2 CM
ECHO BSA: 2.18 M2
ECHO EST RA PRESSURE: 3 MMHG
ECHO LA VOL A-L A2C: 91 ML (ref 18–58)
ECHO LA VOL A-L A4C: 111 ML (ref 18–58)
ECHO LA VOL BP: 95 ML (ref 18–58)
ECHO LA VOL/BSA BIPLANE: 45 ML/M2 (ref 16–34)
ECHO LA VOLUME AREA LENGTH: 104 ML
ECHO LA VOLUME INDEX A-L A2C: 43 ML/M2 (ref 16–34)
ECHO LA VOLUME INDEX A-L A4C: 52 ML/M2 (ref 16–34)
ECHO LA VOLUME INDEX AREA LENGTH: 49 ML/M2 (ref 16–34)
ECHO LV FRACTIONAL SHORTENING: 30 % (ref 28–44)
ECHO LV INTERNAL DIMENSION DIASTOLE INDEX: 2.49 CM/M2
ECHO LV INTERNAL DIMENSION DIASTOLIC: 5.3 CM (ref 4.2–5.9)
ECHO LV INTERNAL DIMENSION SYSTOLIC INDEX: 1.74 CM/M2
ECHO LV INTERNAL DIMENSION SYSTOLIC: 3.7 CM
ECHO LV IVSD: 1.3 CM (ref 0.6–1)
ECHO LV MASS 2D: 286.9 G (ref 88–224)
ECHO LV MASS INDEX 2D: 134.7 G/M2 (ref 49–115)
ECHO LV POSTERIOR WALL DIASTOLIC: 1.3 CM (ref 0.6–1)
ECHO LV RELATIVE WALL THICKNESS RATIO: 0.49
ECHO LVOT AREA: 3.8 CM2
ECHO LVOT AV VTI INDEX: 0.56
ECHO LVOT DIAM: 2.2 CM
ECHO LVOT MEAN GRADIENT: 3 MMHG
ECHO LVOT PEAK GRADIENT: 5 MMHG
ECHO LVOT PEAK VELOCITY: 1.1 M/S
ECHO LVOT STROKE VOLUME INDEX: 47.1 ML/M2
ECHO LVOT SV: 100.3 ML
ECHO LVOT VTI: 26.4 CM
ECHO RV BASAL DIMENSION: 5.4 CM
ECHO RV FREE WALL PEAK S': 17 CM/S
ECHO RV TAPSE: 2 CM (ref 1.7–?)
HCT VFR BLD AUTO: 33.3 % (ref 36–48)
HCT VFR BLD AUTO: 36.4 % (ref 36–48)
HGB BLD-MCNC: 10.4 G/DL (ref 13–16)
HGB BLD-MCNC: 11.5 G/DL (ref 13–16)

## 2023-11-03 PROCEDURE — 85014 HEMATOCRIT: CPT

## 2023-11-03 PROCEDURE — 85018 HEMOGLOBIN: CPT

## 2023-11-03 PROCEDURE — 99203 OFFICE O/P NEW LOW 30 MIN: CPT | Performed by: INTERNAL MEDICINE

## 2023-11-03 PROCEDURE — G0378 HOSPITAL OBSERVATION PER HR: HCPCS

## 2023-11-03 PROCEDURE — 99238 HOSP IP/OBS DSCHRG MGMT 30/<: CPT | Performed by: HOSPITALIST

## 2023-11-03 PROCEDURE — 2580000003 HC RX 258: Performed by: FAMILY MEDICINE

## 2023-11-03 PROCEDURE — 93306 TTE W/DOPPLER COMPLETE: CPT | Performed by: INTERNAL MEDICINE

## 2023-11-03 PROCEDURE — 36415 COLL VENOUS BLD VENIPUNCTURE: CPT

## 2023-11-03 PROCEDURE — 94761 N-INVAS EAR/PLS OXIMETRY MLT: CPT

## 2023-11-03 PROCEDURE — 93306 TTE W/DOPPLER COMPLETE: CPT

## 2023-11-03 PROCEDURE — 6370000000 HC RX 637 (ALT 250 FOR IP): Performed by: FAMILY MEDICINE

## 2023-11-03 RX ADMIN — AMLODIPINE BESYLATE 10 MG: 10 TABLET ORAL at 09:29

## 2023-11-03 RX ADMIN — FINASTERIDE 5 MG: 5 TABLET, FILM COATED ORAL at 08:12

## 2023-11-03 RX ADMIN — SODIUM CHLORIDE, PRESERVATIVE FREE 10 ML: 5 INJECTION INTRAVENOUS at 08:17

## 2023-11-03 RX ADMIN — SERTRALINE 50 MG: 50 TABLET, FILM COATED ORAL at 08:12

## 2023-11-03 RX ADMIN — SPIRONOLACTONE 25 MG: 25 TABLET ORAL at 08:12

## 2023-11-03 RX ADMIN — HYDRALAZINE HYDROCHLORIDE 75 MG: 50 TABLET, FILM COATED ORAL at 08:12

## 2023-11-03 RX ADMIN — FERROUS SULFATE TAB 325 MG (65 MG ELEMENTAL FE) 325 MG: 325 (65 FE) TAB at 08:12

## 2023-11-03 ASSESSMENT — PAIN SCALES - GENERAL
PAINLEVEL_OUTOF10: 0

## 2023-11-03 NOTE — PLAN OF CARE
Problem: Chronic Conditions and Co-morbidities  Goal: Patient's chronic conditions and co-morbidity symptoms are monitored and maintained or improved  Outcome: Completed     Problem: Pain  Goal: Verbalizes/displays adequate comfort level or baseline comfort level  11/3/2023 1136 by Arline Trevino RN  Outcome: Completed  11/3/2023 0013 by Pieter Askew RN  Flowsheets (Taken 11/3/2023 0013)  Verbalizes/displays adequate comfort level or baseline comfort level:   Encourage patient to monitor pain and request assistance   Assess pain using appropriate pain scale     Problem: Discharge Planning  Goal: Discharge to home or other facility with appropriate resources  Outcome: Completed     Problem: Safety - Adult  Goal: Free from fall injury  11/3/2023 1136 by Arline Trevino RN  Outcome: Completed  11/3/2023 0013 by Pieter Askew RN  Outcome: Angela Aguilar (Taken 11/2/2023 2009)  Free From Fall Injury: Instruct family/caregiver on patient safety

## 2023-11-03 NOTE — PROGRESS NOTES
Assumed care of patient Atrial flutter HR of 41in bed alert and oriented x4, ra, denies pain, denies shortness of breath. Bed alarm on. MD aware of HR.

## 2023-11-03 NOTE — CARE COORDINATION
D/C order noted for today. Orders reviewed. No needs identified at this time. Family transport. CM went to room to complete MOON letter. Patient already dc. CM remains available if needed.      CELI Rivers  Care Management

## 2023-11-03 NOTE — PLAN OF CARE
Problem: Pain  Goal: Verbalizes/displays adequate comfort level or baseline comfort level  11/3/2023 0013 by Shruthi aRm RN  Flowsheets (Taken 11/3/2023 0013)  Verbalizes/displays adequate comfort level or baseline comfort level:   Encourage patient to monitor pain and request assistance   Assess pain using appropriate pain scale  11/2/2023 1635 by Danielle Black RN  Outcome: Progressing     Problem: Safety - Adult  Goal: Free from fall injury  Outcome: Progressing  Flowsheets (Taken 11/2/2023 2009)  Free From Fall Injury: Instruct family/caregiver on patient safety

## 2023-11-03 NOTE — DISCHARGE INSTRUCTIONS
Discussed with the patient and all questioned fully answered. He will call me if any problems arise. DISCHARGE SUMMARY from Nurse    PATIENT INSTRUCTIONS:    What to do at Home:  Recommended activity: activity as tolerated,     If you experience any of the following symptoms like bleeding at the incision site, dizziness, shortness of breath, chest discomfort, please follow up with Emergency Department or Primary MD.    *  Please give a list of your current medications to your Primary Care Provider. *  Please update this list whenever your medications are discontinued, doses are      changed, or new medications (including over-the-counter products) are added. *  Please carry medication information at all times in case of emergency situations. These are general instructions for a healthy lifestyle:    No smoking/ No tobacco products/ Avoid exposure to second hand smoke  Surgeon General's Warning:  Quitting smoking now greatly reduces serious risk to your health. Obesity, smoking, and sedentary lifestyle greatly increases your risk for illness    A healthy diet, regular physical exercise & weight monitoring are important for maintaining a healthy lifestyle    You may be retaining fluid if you have a history of heart failure or if you experience any of the following symptoms:  Weight gain of 3 pounds or more overnight or 5 pounds in a week, increased swelling in our hands or feet or shortness of breath while lying flat in bed. Please call your doctor as soon as you notice any of these symptoms; do not wait until your next office visit. The discharge information has been reviewed with the patient. The patient verbalized understanding. Discharge medications reviewed with the patient and appropriate educational materials and side effects teaching were provided.   ___________________________________________________________________________________________________________________________________

## 2023-11-03 NOTE — PROGRESS NOTES
Discharge patient in stable condition, discharge instructions given and understood well by the patient. IV line removed aseptically. Patient taken down stairs via wheelchair.

## 2024-01-29 ASSESSMENT — PAIN SCALES - GENERAL: PAINLEVEL_OUTOF10: 0

## 2024-01-30 ENCOUNTER — HOSPITAL ENCOUNTER (INPATIENT)
Facility: HOSPITAL | Age: 69
Discharge: HOME | DRG: 682 | End: 2024-02-02
Attending: STUDENT IN AN ORGANIZED HEALTH CARE EDUCATION/TRAINING PROGRAM
Payer: MEDICARE

## 2024-01-30 ENCOUNTER — APPOINTMENT (OUTPATIENT)
Facility: HOSPITAL | Age: 69
DRG: 682 | End: 2024-01-30
Attending: STUDENT IN AN ORGANIZED HEALTH CARE EDUCATION/TRAINING PROGRAM
Payer: MEDICARE

## 2024-01-30 ENCOUNTER — HOSPITAL ENCOUNTER (INPATIENT)
Facility: HOSPITAL | Age: 69
LOS: 2 days | Discharge: HOME HEALTH | DRG: 682 | End: 2024-02-01
Attending: STUDENT IN AN ORGANIZED HEALTH CARE EDUCATION/TRAINING PROGRAM | Admitting: INTERNAL MEDICINE
Payer: MEDICARE

## 2024-01-30 DIAGNOSIS — R79.89 ELEVATED BRAIN NATRIURETIC PEPTIDE (BNP) LEVEL: Primary | ICD-10-CM

## 2024-01-30 PROBLEM — M10.9 GOUT: Status: ACTIVE | Noted: 2024-01-30

## 2024-01-30 PROBLEM — N17.9 AKI (ACUTE KIDNEY INJURY) (HCC): Status: ACTIVE | Noted: 2024-01-30

## 2024-01-30 LAB
ALBUMIN SERPL-MCNC: 2.3 G/DL (ref 3.4–5)
ALBUMIN/GLOB SERPL: 0.7 (ref 0.8–1.7)
ALP SERPL-CCNC: 96 U/L (ref 45–117)
ALT SERPL-CCNC: 88 U/L (ref 16–61)
ANION GAP SERPL CALC-SCNC: 2 MMOL/L (ref 3–18)
APPEARANCE UR: CLEAR
AST SERPL-CCNC: 119 U/L (ref 10–38)
BACTERIA URNS QL MICRO: ABNORMAL /HPF
BILIRUB SERPL-MCNC: 0.4 MG/DL (ref 0.2–1)
BILIRUB UR QL: NEGATIVE
BUN SERPL-MCNC: 42 MG/DL (ref 7–18)
BUN/CREAT SERPL: 14 (ref 12–20)
CALCIUM SERPL-MCNC: 8 MG/DL (ref 8.5–10.1)
CHLORIDE SERPL-SCNC: 115 MMOL/L (ref 100–111)
CO2 SERPL-SCNC: 27 MMOL/L (ref 21–32)
COLOR UR: YELLOW
CREAT SERPL-MCNC: 2.9 MG/DL (ref 0.6–1.3)
CREAT UR-MCNC: 47 MG/DL (ref 30–125)
EKG ATRIAL RATE: 33 BPM
EKG DIAGNOSIS: NORMAL
EKG Q-T INTERVAL: 522 MS
EKG QRS DURATION: 102 MS
EKG QTC CALCULATION (BAZETT): 441 MS
EKG R AXIS: 51 DEGREES
EKG T AXIS: 23 DEGREES
EKG VENTRICULAR RATE: 43 BPM
EPITH CASTS URNS QL MICRO: ABNORMAL /LPF (ref 0–5)
ERYTHROCYTE [DISTWIDTH] IN BLOOD BY AUTOMATED COUNT: 14.6 % (ref 11.6–14.5)
ETHANOL SERPL-MCNC: <3 MG/DL (ref 0–3)
FERRITIN SERPL-MCNC: 46 NG/ML (ref 8–388)
GLOBULIN SER CALC-MCNC: 3.2 G/DL (ref 2–4)
GLUCOSE BLD STRIP.AUTO-MCNC: 109 MG/DL (ref 70–110)
GLUCOSE BLD STRIP.AUTO-MCNC: 110 MG/DL (ref 70–110)
GLUCOSE BLD STRIP.AUTO-MCNC: 122 MG/DL (ref 70–110)
GLUCOSE BLD STRIP.AUTO-MCNC: 154 MG/DL (ref 70–110)
GLUCOSE SERPL-MCNC: 101 MG/DL (ref 74–99)
GLUCOSE UR STRIP.AUTO-MCNC: NEGATIVE MG/DL
HCT VFR BLD AUTO: 25.8 % (ref 36–48)
HGB BLD-MCNC: 8 G/DL (ref 13–16)
HGB UR QL STRIP: ABNORMAL
HYALINE CASTS URNS QL MICRO: ABNORMAL /LPF (ref 0–2)
IRON SATN MFR SERPL: 10 % (ref 20–50)
IRON SERPL-MCNC: 24 UG/DL (ref 50–175)
KETONES UR QL STRIP.AUTO: NEGATIVE MG/DL
LEUKOCYTE ESTERASE UR QL STRIP.AUTO: NEGATIVE
MAGNESIUM SERPL-MCNC: 2.2 MG/DL (ref 1.6–2.6)
MCH RBC QN AUTO: 26.3 PG (ref 24–34)
MCHC RBC AUTO-ENTMCNC: 31 G/DL (ref 31–37)
MCV RBC AUTO: 84.9 FL (ref 78–100)
NITRITE UR QL STRIP.AUTO: NEGATIVE
NRBC # BLD: 0 K/UL (ref 0–0.01)
NRBC BLD-RTO: 0 PER 100 WBC
NT PRO BNP: ABNORMAL PG/ML (ref 0–900)
PH UR STRIP: 6 (ref 5–8)
PLATELET # BLD AUTO: 258 K/UL (ref 135–420)
PMV BLD AUTO: 10.6 FL (ref 9.2–11.8)
POTASSIUM SERPL-SCNC: 3.3 MMOL/L (ref 3.5–5.5)
PROT SERPL-MCNC: 5.5 G/DL (ref 6.4–8.2)
PROT UR STRIP-MCNC: 300 MG/DL
PROT UR-MCNC: 337 MG/DL
PROT/CREAT UR-RTO: 7.2
RBC # BLD AUTO: 3.04 M/UL (ref 4.35–5.65)
RBC #/AREA URNS HPF: ABNORMAL /HPF (ref 0–5)
SODIUM SERPL-SCNC: 144 MMOL/L (ref 136–145)
SP GR UR REFRACTOMETRY: 1.01 (ref 1–1.03)
TIBC SERPL-MCNC: 251 UG/DL (ref 250–450)
TROPONIN I SERPL HS-MCNC: 41 NG/L (ref 0–78)
UROBILINOGEN UR QL STRIP.AUTO: 0.2 EU/DL (ref 0.2–1)
WBC # BLD AUTO: 8.3 K/UL (ref 4.6–13.2)
WBC URNS QL MICRO: ABNORMAL /HPF (ref 0–4)

## 2024-01-30 PROCEDURE — 83550 IRON BINDING TEST: CPT

## 2024-01-30 PROCEDURE — 97162 PT EVAL MOD COMPLEX 30 MIN: CPT

## 2024-01-30 PROCEDURE — 85027 COMPLETE CBC AUTOMATED: CPT

## 2024-01-30 PROCEDURE — 71045 X-RAY EXAM CHEST 1 VIEW: CPT

## 2024-01-30 PROCEDURE — 82077 ASSAY SPEC XCP UR&BREATH IA: CPT

## 2024-01-30 PROCEDURE — 82962 GLUCOSE BLOOD TEST: CPT

## 2024-01-30 PROCEDURE — 6360000002 HC RX W HCPCS: Performed by: INTERNAL MEDICINE

## 2024-01-30 PROCEDURE — 1100000000 HC RM PRIVATE

## 2024-01-30 PROCEDURE — 83735 ASSAY OF MAGNESIUM: CPT

## 2024-01-30 PROCEDURE — 83540 ASSAY OF IRON: CPT

## 2024-01-30 PROCEDURE — 93005 ELECTROCARDIOGRAM TRACING: CPT | Performed by: INTERNAL MEDICINE

## 2024-01-30 PROCEDURE — 81001 URINALYSIS AUTO W/SCOPE: CPT

## 2024-01-30 PROCEDURE — 94761 N-INVAS EAR/PLS OXIMETRY MLT: CPT

## 2024-01-30 PROCEDURE — 80053 COMPREHEN METABOLIC PANEL: CPT

## 2024-01-30 PROCEDURE — 82570 ASSAY OF URINE CREATININE: CPT

## 2024-01-30 PROCEDURE — 6370000000 HC RX 637 (ALT 250 FOR IP): Performed by: INTERNAL MEDICINE

## 2024-01-30 PROCEDURE — 83880 ASSAY OF NATRIURETIC PEPTIDE: CPT

## 2024-01-30 PROCEDURE — 93010 ELECTROCARDIOGRAM REPORT: CPT | Performed by: INTERNAL MEDICINE

## 2024-01-30 PROCEDURE — 82728 ASSAY OF FERRITIN: CPT

## 2024-01-30 PROCEDURE — 84484 ASSAY OF TROPONIN QUANT: CPT

## 2024-01-30 PROCEDURE — 97116 GAIT TRAINING THERAPY: CPT

## 2024-01-30 PROCEDURE — 76705 ECHO EXAM OF ABDOMEN: CPT

## 2024-01-30 PROCEDURE — 36415 COLL VENOUS BLD VENIPUNCTURE: CPT

## 2024-01-30 PROCEDURE — 2580000003 HC RX 258: Performed by: INTERNAL MEDICINE

## 2024-01-30 PROCEDURE — 84156 ASSAY OF PROTEIN URINE: CPT

## 2024-01-30 RX ORDER — POLYETHYLENE GLYCOL 3350 17 G/17G
17 POWDER, FOR SOLUTION ORAL DAILY PRN
Status: DISCONTINUED | OUTPATIENT
Start: 2024-01-30 | End: 2024-02-01 | Stop reason: HOSPADM

## 2024-01-30 RX ORDER — ACETAMINOPHEN 325 MG/1
650 TABLET ORAL EVERY 6 HOURS PRN
Status: DISCONTINUED | OUTPATIENT
Start: 2024-01-30 | End: 2024-02-01 | Stop reason: HOSPADM

## 2024-01-30 RX ORDER — FINASTERIDE 5 MG/1
5 TABLET, FILM COATED ORAL DAILY
Status: DISCONTINUED | OUTPATIENT
Start: 2024-01-30 | End: 2024-02-01 | Stop reason: HOSPADM

## 2024-01-30 RX ORDER — SODIUM CHLORIDE 0.9 % (FLUSH) 0.9 %
5-40 SYRINGE (ML) INJECTION EVERY 12 HOURS SCHEDULED
Status: DISCONTINUED | OUTPATIENT
Start: 2024-01-30 | End: 2024-02-01 | Stop reason: HOSPADM

## 2024-01-30 RX ORDER — SODIUM CHLORIDE, SODIUM LACTATE, POTASSIUM CHLORIDE, CALCIUM CHLORIDE 600; 310; 30; 20 MG/100ML; MG/100ML; MG/100ML; MG/100ML
INJECTION, SOLUTION INTRAVENOUS CONTINUOUS
Status: DISCONTINUED | OUTPATIENT
Start: 2024-01-30 | End: 2024-01-30

## 2024-01-30 RX ORDER — MAGNESIUM SULFATE IN WATER 40 MG/ML
2000 INJECTION, SOLUTION INTRAVENOUS PRN
Status: DISCONTINUED | OUTPATIENT
Start: 2024-01-30 | End: 2024-02-01 | Stop reason: HOSPADM

## 2024-01-30 RX ORDER — ONDANSETRON 2 MG/ML
4 INJECTION INTRAMUSCULAR; INTRAVENOUS EVERY 6 HOURS PRN
Status: DISCONTINUED | OUTPATIENT
Start: 2024-01-30 | End: 2024-02-01 | Stop reason: HOSPADM

## 2024-01-30 RX ORDER — INSULIN LISPRO 100 [IU]/ML
0-4 INJECTION, SOLUTION INTRAVENOUS; SUBCUTANEOUS NIGHTLY
Status: DISCONTINUED | OUTPATIENT
Start: 2024-01-30 | End: 2024-02-01 | Stop reason: HOSPADM

## 2024-01-30 RX ORDER — BUMETANIDE 0.25 MG/ML
1 INJECTION INTRAMUSCULAR; INTRAVENOUS 2 TIMES DAILY
Status: DISCONTINUED | OUTPATIENT
Start: 2024-01-30 | End: 2024-02-01

## 2024-01-30 RX ORDER — POTASSIUM CHLORIDE 7.45 MG/ML
10 INJECTION INTRAVENOUS PRN
Status: DISCONTINUED | OUTPATIENT
Start: 2024-01-30 | End: 2024-02-01 | Stop reason: HOSPADM

## 2024-01-30 RX ORDER — DEXTROSE MONOHYDRATE 100 MG/ML
INJECTION, SOLUTION INTRAVENOUS CONTINUOUS PRN
Status: DISCONTINUED | OUTPATIENT
Start: 2024-01-30 | End: 2024-02-01 | Stop reason: HOSPADM

## 2024-01-30 RX ORDER — ASPIRIN 81 MG/1
81 TABLET, CHEWABLE ORAL
Status: DISCONTINUED | OUTPATIENT
Start: 2024-01-30 | End: 2024-02-01 | Stop reason: HOSPADM

## 2024-01-30 RX ORDER — INSULIN LISPRO 100 [IU]/ML
0-4 INJECTION, SOLUTION INTRAVENOUS; SUBCUTANEOUS
Status: DISCONTINUED | OUTPATIENT
Start: 2024-01-30 | End: 2024-02-01 | Stop reason: HOSPADM

## 2024-01-30 RX ORDER — SODIUM CHLORIDE 0.9 % (FLUSH) 0.9 %
5-40 SYRINGE (ML) INJECTION PRN
Status: DISCONTINUED | OUTPATIENT
Start: 2024-01-30 | End: 2024-02-01 | Stop reason: HOSPADM

## 2024-01-30 RX ORDER — SODIUM CHLORIDE 9 MG/ML
INJECTION, SOLUTION INTRAVENOUS PRN
Status: DISCONTINUED | OUTPATIENT
Start: 2024-01-30 | End: 2024-02-01 | Stop reason: HOSPADM

## 2024-01-30 RX ORDER — ACETAMINOPHEN 650 MG/1
650 SUPPOSITORY RECTAL EVERY 6 HOURS PRN
Status: DISCONTINUED | OUTPATIENT
Start: 2024-01-30 | End: 2024-02-01 | Stop reason: HOSPADM

## 2024-01-30 RX ORDER — POTASSIUM CHLORIDE 20 MEQ/1
20 TABLET, EXTENDED RELEASE ORAL DAILY
Status: DISCONTINUED | OUTPATIENT
Start: 2024-01-30 | End: 2024-02-01 | Stop reason: HOSPADM

## 2024-01-30 RX ORDER — ONDANSETRON 4 MG/1
4 TABLET, ORALLY DISINTEGRATING ORAL EVERY 8 HOURS PRN
Status: DISCONTINUED | OUTPATIENT
Start: 2024-01-30 | End: 2024-02-01 | Stop reason: HOSPADM

## 2024-01-30 RX ORDER — AMLODIPINE BESYLATE 10 MG/1
10 TABLET ORAL DAILY
Status: DISCONTINUED | OUTPATIENT
Start: 2024-01-30 | End: 2024-02-01 | Stop reason: HOSPADM

## 2024-01-30 RX ORDER — FERROUS SULFATE 325(65) MG
325 TABLET ORAL
Status: DISCONTINUED | OUTPATIENT
Start: 2024-01-30 | End: 2024-02-01 | Stop reason: HOSPADM

## 2024-01-30 RX ORDER — HEPARIN SODIUM 5000 [USP'U]/ML
5000 INJECTION, SOLUTION INTRAVENOUS; SUBCUTANEOUS EVERY 8 HOURS SCHEDULED
Status: DISCONTINUED | OUTPATIENT
Start: 2024-01-30 | End: 2024-02-01 | Stop reason: HOSPADM

## 2024-01-30 RX ADMIN — HYDRALAZINE HYDROCHLORIDE 75 MG: 25 TABLET ORAL at 18:02

## 2024-01-30 RX ADMIN — SODIUM CHLORIDE, PRESERVATIVE FREE 10 ML: 5 INJECTION INTRAVENOUS at 21:50

## 2024-01-30 RX ADMIN — POTASSIUM BICARBONATE 50 MEQ: 978 TABLET, EFFERVESCENT ORAL at 09:13

## 2024-01-30 RX ADMIN — HYDRALAZINE HYDROCHLORIDE 75 MG: 25 TABLET ORAL at 09:13

## 2024-01-30 RX ADMIN — HEPARIN SODIUM 5000 UNITS: 5000 INJECTION INTRAVENOUS; SUBCUTANEOUS at 06:21

## 2024-01-30 RX ADMIN — BUMETANIDE 1 MG: 0.25 INJECTION, SOLUTION INTRAMUSCULAR; INTRAVENOUS at 15:10

## 2024-01-30 RX ADMIN — HEPARIN SODIUM 5000 UNITS: 5000 INJECTION INTRAVENOUS; SUBCUTANEOUS at 21:49

## 2024-01-30 RX ADMIN — SERTRALINE HYDROCHLORIDE 50 MG: 50 TABLET ORAL at 09:13

## 2024-01-30 RX ADMIN — SODIUM CHLORIDE, POTASSIUM CHLORIDE, SODIUM LACTATE AND CALCIUM CHLORIDE: 600; 310; 30; 20 INJECTION, SOLUTION INTRAVENOUS at 02:46

## 2024-01-30 RX ADMIN — ASPIRIN 81 MG: 81 TABLET, CHEWABLE ORAL at 09:13

## 2024-01-30 RX ADMIN — HEPARIN SODIUM 5000 UNITS: 5000 INJECTION INTRAVENOUS; SUBCUTANEOUS at 15:10

## 2024-01-30 RX ADMIN — HYDRALAZINE HYDROCHLORIDE 75 MG: 25 TABLET ORAL at 02:45

## 2024-01-30 RX ADMIN — SODIUM CHLORIDE, PRESERVATIVE FREE 10 ML: 5 INJECTION INTRAVENOUS at 09:19

## 2024-01-30 RX ADMIN — POTASSIUM CHLORIDE 20 MEQ: 1500 TABLET, EXTENDED RELEASE ORAL at 15:10

## 2024-01-30 RX ADMIN — FERROUS SULFATE TAB 325 MG (65 MG ELEMENTAL FE) 325 MG: 325 (65 FE) TAB at 09:13

## 2024-01-30 RX ADMIN — AMLODIPINE BESYLATE 10 MG: 10 TABLET ORAL at 09:13

## 2024-01-30 RX ADMIN — BUMETANIDE 1 MG: 0.25 INJECTION, SOLUTION INTRAMUSCULAR; INTRAVENOUS at 21:50

## 2024-01-30 RX ADMIN — FINASTERIDE 5 MG: 5 TABLET, FILM COATED ORAL at 09:13

## 2024-01-30 ASSESSMENT — PAIN SCALES - GENERAL
PAINLEVEL_OUTOF10: 0

## 2024-01-30 NOTE — H&P
History & Physical    Patient: Amish Shetty MRN: [id number]  [facility]: [id number]    YOB: 1955  Age: 68 y.o.  Sex: male      DOA: 1/30/2024    Chief Complaint: Anasarca and YARIEL    HPI:     Amish Shetty is a 68 y.o.  male with a past medical history of CKD stage III, iron deficiency anemia, CHIOMA, hyperlipidemia, history of CVA, type 2 diabetes, gout, hypertension, A-fib with slow ventricular response who presents for anasarca and YARIEL.  Patient initially presented to [facility] for anasarca and YARIEL.  The patient noticed that for the past few weeks he has been building up fluid in his legs and right hand.  He denies any shortness of breath.  He also reports that he fell 3 to 4 days ago in his yard and landed on his left side and that it has been sore since.  He does not report any nausea or vomiting but does report that he is weaker than he has been in the past few weeks.  Patient had a kidney biopsy in November 2023 which showed amyloidosis with diabetic glomerulopathy.  Patient follows with Dr. Ramírez with [facility].    Upon arrival to [facility] the patient was noted to be afebrile with a respiratory rate of 18 and a heart rate of 50 with a blood pressure of 187/75 and the patient was satting at 100% on room air.  Labs were notable for troponin of 63, a D-dimer of 8.6, a proBNP of 8789, a AST of 187 and an ALT of 96, a sodium of 146, a creatinine of 3.1, a calcium of 8.0 a albumin of 2.3, a hemoglobin of 8.0.  The patient had a venous duplex ultrasound which was negative for DVT and the patient was admitted for further work    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     On Allopurinol    Chronic hypokalemia     Persistent chronic

## 2024-01-30 NOTE — CONSULTS
CHEST PORTABLE    Result Date: 1/29/2024  Clear lungs. Cardiomegaly and/or pericardial effusion. Signed By: Vini Dukes MD on 1/29/2024 11:15 AM         CULTURE:   No results for input(s): \"SDES\" in the last 72 hours.    Invalid input(s): \"CULT\"  Invalid input(s): \"CULT\"    Physical Assessment:   Visit Vitals  BP (!) 169/82   Pulse 57   Temp 98.1 °F (36.7 °C) (Oral)   Resp 18   SpO2 95%     [unfilled]    Intake/Output Summary (Last 24 hours) at 1/30/2024 1412  Last data filed at 1/30/2024 1324  Gross per 24 hour   Intake 880 ml   Output 1950 ml   Net -1070 ml       Physial Exam:  General appearance: alert  Skin: normal coloration and turgor, no rashes, no suspicious skin lesions noted.  HEENT: Head; normocephalic, atraumatic. SHERRY. ENT- ENT exam normal, no neck nodes or sinus tenderness.   Lungs: clear to auscultation bilaterally  Heart: S1, S2 normal  Abdomen: soft, non-tender; bowel sounds normal; no masses,  no organomegaly  Extremities: edema +++    PLAN / RECOMMENDATION:    Acute/crf stage 3,nephrotic syndrome,biopsy showed amyloidosis and diabetic nephropathy,hold aldactone for now,try iv bumex for anasarca,his renal function will probably worsen and he will need dialysis in the near future  Give daily potassium ,monitor  Anemia,consult hematology again ,? Any treatment for amyloidosis,will need epo for anemia,check iron studies     Thank you for the consultation to participate in patient's care. I have personally discussed my plan with the referring physician.     JEEVAN GARCIA MD  January 30, 2024

## 2024-01-31 ENCOUNTER — APPOINTMENT (OUTPATIENT)
Facility: HOSPITAL | Age: 69
DRG: 682 | End: 2024-01-31
Attending: STUDENT IN AN ORGANIZED HEALTH CARE EDUCATION/TRAINING PROGRAM
Payer: MEDICARE

## 2024-01-31 ENCOUNTER — HOME HEALTH ADMISSION (OUTPATIENT)
Age: 69
End: 2024-01-31
Payer: MEDICARE

## 2024-01-31 LAB
ALBUMIN SERPL-MCNC: 2.2 G/DL (ref 3.4–5)
ANION GAP SERPL CALC-SCNC: 3 MMOL/L (ref 3–18)
BASOPHILS # BLD: 0.1 K/UL (ref 0–0.1)
BASOPHILS NFR BLD: 1 % (ref 0–2)
BUN SERPL-MCNC: 38 MG/DL (ref 7–18)
BUN/CREAT SERPL: 14 (ref 12–20)
CALCIUM SERPL-MCNC: 8.4 MG/DL (ref 8.5–10.1)
CHLORIDE SERPL-SCNC: 113 MMOL/L (ref 100–111)
CO2 SERPL-SCNC: 24 MMOL/L (ref 21–32)
CREAT SERPL-MCNC: 2.78 MG/DL (ref 0.6–1.3)
DIFFERENTIAL METHOD BLD: ABNORMAL
ECHO AO ASC DIAM: 4 CM
ECHO AO ASCENDING AORTA INDEX: 1.88 CM/M2
ECHO AO ROOT DIAM: 3.5 CM
ECHO AO ROOT INDEX: 1.64 CM/M2
ECHO AV AREA PEAK VELOCITY: 2.5 CM2
ECHO AV AREA VTI: 2.4 CM2
ECHO AV AREA/BSA PEAK VELOCITY: 1.2 CM2/M2
ECHO AV AREA/BSA VTI: 1.1 CM2/M2
ECHO AV MEAN GRADIENT: 14 MMHG
ECHO AV MEAN VELOCITY: 1.8 M/S
ECHO AV PEAK GRADIENT: 31 MMHG
ECHO AV PEAK VELOCITY: 2.8 M/S
ECHO AV VELOCITY RATIO: 0.61
ECHO AV VTI: 56 CM
ECHO BSA: 2.18 M2
ECHO LA VOL A-L A2C: 128 ML (ref 18–58)
ECHO LA VOL A-L A4C: 106 ML (ref 18–58)
ECHO LA VOL MOD A2C: 123 ML (ref 18–58)
ECHO LA VOL MOD A4C: 97 ML (ref 18–58)
ECHO LA VOLUME AREA LENGTH: 121 ML
ECHO LA VOLUME INDEX A-L A2C: 60 ML/M2 (ref 16–34)
ECHO LA VOLUME INDEX A-L A4C: 50 ML/M2 (ref 16–34)
ECHO LA VOLUME INDEX AREA LENGTH: 57 ML/M2 (ref 16–34)
ECHO LA VOLUME INDEX MOD A2C: 58 ML/M2 (ref 16–34)
ECHO LA VOLUME INDEX MOD A4C: 46 ML/M2 (ref 16–34)
ECHO LV EDV A2C: 153 ML
ECHO LV EDV A4C: 95 ML
ECHO LV EDV BP: 130 ML (ref 67–155)
ECHO LV EDV INDEX A4C: 45 ML/M2
ECHO LV EDV INDEX BP: 61 ML/M2
ECHO LV EDV NDEX A2C: 72 ML/M2
ECHO LV EJECTION FRACTION A2C: 78 %
ECHO LV EJECTION FRACTION A4C: 62 %
ECHO LV EJECTION FRACTION BIPLANE: 73 % (ref 55–100)
ECHO LV ESV A2C: 33 ML
ECHO LV ESV A4C: 36 ML
ECHO LV ESV BP: 35 ML (ref 22–58)
ECHO LV ESV INDEX A2C: 15 ML/M2
ECHO LV ESV INDEX A4C: 17 ML/M2
ECHO LV ESV INDEX BP: 16 ML/M2
ECHO LV FRACTIONAL SHORTENING: 38 % (ref 28–44)
ECHO LV INTERNAL DIMENSION DIASTOLE INDEX: 2.72 CM/M2
ECHO LV INTERNAL DIMENSION DIASTOLIC: 5.8 CM (ref 4.2–5.9)
ECHO LV INTERNAL DIMENSION SYSTOLIC INDEX: 1.69 CM/M2
ECHO LV INTERNAL DIMENSION SYSTOLIC: 3.6 CM
ECHO LV IVSD: 1.3 CM (ref 0.6–1)
ECHO LV MASS 2D: 314 G (ref 88–224)
ECHO LV MASS INDEX 2D: 147.4 G/M2 (ref 49–115)
ECHO LV POSTERIOR WALL DIASTOLIC: 1.2 CM (ref 0.6–1)
ECHO LV RELATIVE WALL THICKNESS RATIO: 0.41
ECHO LVOT AREA: 3.8 CM2
ECHO LVOT AV VTI INDEX: 0.62
ECHO LVOT DIAM: 2.2 CM
ECHO LVOT MEAN GRADIENT: 6 MMHG
ECHO LVOT PEAK GRADIENT: 12 MMHG
ECHO LVOT PEAK VELOCITY: 1.7 M/S
ECHO LVOT STROKE VOLUME INDEX: 61.7 ML/M2
ECHO LVOT SV: 131.5 ML
ECHO LVOT VTI: 34.6 CM
ECHO MV A VELOCITY: 0.43 M/S
ECHO MV AREA PHT: 3.4 CM2
ECHO MV AREA VTI: 3.2 CM2
ECHO MV E DECELERATION TIME (DT): 134.4 MS
ECHO MV E VELOCITY: 1.28 M/S
ECHO MV E/A RATIO: 2.98
ECHO MV LVOT VTI INDEX: 1.18
ECHO MV MAX VELOCITY: 1.6 M/S
ECHO MV MEAN GRADIENT: 2 MMHG
ECHO MV MEAN VELOCITY: 0.6 M/S
ECHO MV PEAK GRADIENT: 10 MMHG
ECHO MV PRESSURE HALF TIME (PHT): 65.2 MS
ECHO MV VTI: 40.7 CM
ECHO PULMONARY ARTERY END DIASTOLIC PRESSURE: 7 MMHG
ECHO PV REGURGITANT MAX VELOCITY: 1.3 M/S
ECHO TV REGURGITANT MAX VELOCITY: 2.89 M/S
ECHO TV REGURGITANT PEAK GRADIENT: 33 MMHG
EOSINOPHIL # BLD: 0.3 K/UL (ref 0–0.4)
EOSINOPHIL NFR BLD: 3 % (ref 0–5)
ERYTHROCYTE [DISTWIDTH] IN BLOOD BY AUTOMATED COUNT: 14.4 % (ref 11.6–14.5)
GLUCOSE BLD STRIP.AUTO-MCNC: 109 MG/DL (ref 70–110)
GLUCOSE BLD STRIP.AUTO-MCNC: 110 MG/DL (ref 70–110)
GLUCOSE BLD STRIP.AUTO-MCNC: 114 MG/DL (ref 70–110)
GLUCOSE BLD STRIP.AUTO-MCNC: 122 MG/DL (ref 70–110)
GLUCOSE SERPL-MCNC: 85 MG/DL (ref 74–99)
HCT VFR BLD AUTO: 25.1 % (ref 36–48)
HGB BLD-MCNC: 7.6 G/DL (ref 13–16)
IMM GRANULOCYTES # BLD AUTO: 0 K/UL (ref 0–0.04)
IMM GRANULOCYTES NFR BLD AUTO: 0 % (ref 0–0.5)
LYMPHOCYTES # BLD: 1.4 K/UL (ref 0.9–3.6)
LYMPHOCYTES NFR BLD: 16 % (ref 21–52)
MAGNESIUM SERPL-MCNC: 2.1 MG/DL (ref 1.6–2.6)
MCH RBC QN AUTO: 26.3 PG (ref 24–34)
MCHC RBC AUTO-ENTMCNC: 30.3 G/DL (ref 31–37)
MCV RBC AUTO: 86.9 FL (ref 78–100)
MONOCYTES # BLD: 0.9 K/UL (ref 0.05–1.2)
MONOCYTES NFR BLD: 11 % (ref 3–10)
NEUTS SEG # BLD: 6 K/UL (ref 1.8–8)
NEUTS SEG NFR BLD: 69 % (ref 40–73)
NRBC # BLD: 0 K/UL (ref 0–0.01)
NRBC BLD-RTO: 0 PER 100 WBC
PHOSPHATE SERPL-MCNC: 3.8 MG/DL (ref 2.5–4.9)
PLATELET # BLD AUTO: 247 K/UL (ref 135–420)
PMV BLD AUTO: 11.2 FL (ref 9.2–11.8)
POTASSIUM SERPL-SCNC: 3.9 MMOL/L (ref 3.5–5.5)
RBC # BLD AUTO: 2.89 M/UL (ref 4.35–5.65)
SODIUM SERPL-SCNC: 140 MMOL/L (ref 136–145)
WBC # BLD AUTO: 8.7 K/UL (ref 4.6–13.2)

## 2024-01-31 PROCEDURE — 6360000002 HC RX W HCPCS: Performed by: INTERNAL MEDICINE

## 2024-01-31 PROCEDURE — 6370000000 HC RX 637 (ALT 250 FOR IP): Performed by: INTERNAL MEDICINE

## 2024-01-31 PROCEDURE — 97535 SELF CARE MNGMENT TRAINING: CPT

## 2024-01-31 PROCEDURE — 93306 TTE W/DOPPLER COMPLETE: CPT

## 2024-01-31 PROCEDURE — 1100000000 HC RM PRIVATE

## 2024-01-31 PROCEDURE — 97165 OT EVAL LOW COMPLEX 30 MIN: CPT

## 2024-01-31 PROCEDURE — 80069 RENAL FUNCTION PANEL: CPT

## 2024-01-31 PROCEDURE — 85025 COMPLETE CBC W/AUTO DIFF WBC: CPT

## 2024-01-31 PROCEDURE — 94761 N-INVAS EAR/PLS OXIMETRY MLT: CPT

## 2024-01-31 PROCEDURE — 83735 ASSAY OF MAGNESIUM: CPT

## 2024-01-31 PROCEDURE — 36415 COLL VENOUS BLD VENIPUNCTURE: CPT

## 2024-01-31 PROCEDURE — 2580000003 HC RX 258: Performed by: INTERNAL MEDICINE

## 2024-01-31 PROCEDURE — 82962 GLUCOSE BLOOD TEST: CPT

## 2024-01-31 RX ORDER — HYDRALAZINE HYDROCHLORIDE 50 MG/1
100 TABLET, FILM COATED ORAL 2 TIMES DAILY
Status: DISCONTINUED | OUTPATIENT
Start: 2024-01-31 | End: 2024-02-01 | Stop reason: HOSPADM

## 2024-01-31 RX ADMIN — ASPIRIN 81 MG: 81 TABLET, CHEWABLE ORAL at 11:14

## 2024-01-31 RX ADMIN — HEPARIN SODIUM 5000 UNITS: 5000 INJECTION INTRAVENOUS; SUBCUTANEOUS at 21:20

## 2024-01-31 RX ADMIN — HYDRALAZINE HYDROCHLORIDE 75 MG: 25 TABLET ORAL at 02:00

## 2024-01-31 RX ADMIN — FERROUS SULFATE TAB 325 MG (65 MG ELEMENTAL FE) 325 MG: 325 (65 FE) TAB at 11:13

## 2024-01-31 RX ADMIN — POTASSIUM CHLORIDE 20 MEQ: 1500 TABLET, EXTENDED RELEASE ORAL at 11:13

## 2024-01-31 RX ADMIN — SODIUM CHLORIDE, PRESERVATIVE FREE 10 ML: 5 INJECTION INTRAVENOUS at 11:16

## 2024-01-31 RX ADMIN — HEPARIN SODIUM 5000 UNITS: 5000 INJECTION INTRAVENOUS; SUBCUTANEOUS at 06:28

## 2024-01-31 RX ADMIN — SERTRALINE HYDROCHLORIDE 50 MG: 50 TABLET ORAL at 11:14

## 2024-01-31 RX ADMIN — BUMETANIDE 1 MG: 0.25 INJECTION, SOLUTION INTRAMUSCULAR; INTRAVENOUS at 11:14

## 2024-01-31 RX ADMIN — HYDRALAZINE HYDROCHLORIDE 75 MG: 25 TABLET ORAL at 11:12

## 2024-01-31 RX ADMIN — HYDRALAZINE HYDROCHLORIDE 100 MG: 50 TABLET, FILM COATED ORAL at 15:29

## 2024-01-31 RX ADMIN — SODIUM CHLORIDE, PRESERVATIVE FREE 10 ML: 5 INJECTION INTRAVENOUS at 21:20

## 2024-01-31 RX ADMIN — BUMETANIDE 1 MG: 0.25 INJECTION, SOLUTION INTRAMUSCULAR; INTRAVENOUS at 21:20

## 2024-01-31 RX ADMIN — AMLODIPINE BESYLATE 10 MG: 10 TABLET ORAL at 11:14

## 2024-01-31 ASSESSMENT — PAIN DESCRIPTION - DESCRIPTORS
DESCRIPTORS: ACHING

## 2024-01-31 ASSESSMENT — PAIN DESCRIPTION - LOCATION
LOCATION: GENERALIZED

## 2024-01-31 ASSESSMENT — PAIN SCALES - GENERAL
PAINLEVEL_OUTOF10: 0

## 2024-01-31 ASSESSMENT — PAIN - FUNCTIONAL ASSESSMENT
PAIN_FUNCTIONAL_ASSESSMENT: ACTIVITIES ARE NOT PREVENTED

## 2024-01-31 ASSESSMENT — PAIN DESCRIPTION - ONSET
ONSET: ON-GOING

## 2024-01-31 ASSESSMENT — PAIN DESCRIPTION - ORIENTATION
ORIENTATION: OTHER (COMMENT)

## 2024-01-31 ASSESSMENT — PAIN DESCRIPTION - DIRECTION
RADIATING_TOWARDS: NO

## 2024-01-31 ASSESSMENT — PAIN DESCRIPTION - PAIN TYPE
TYPE: ACUTE PAIN

## 2024-01-31 ASSESSMENT — PAIN DESCRIPTION - FREQUENCY
FREQUENCY: INTERMITTENT

## 2024-01-31 NOTE — PLAN OF CARE
Problem: Chronic Conditions and Co-morbidities  Goal: Patient's chronic conditions and co-morbidity symptoms are monitored and maintained or improved  Outcome: Not Progressing     Problem: Pain  Goal: Verbalizes/displays adequate comfort level or baseline comfort level  Outcome: Not Progressing     Problem: Skin/Tissue Integrity  Goal: Absence of new skin breakdown  Description: 1.  Monitor for areas of redness and/or skin breakdown  2.  Assess vascular access sites hourly  3.  Every 4-6 hours minimum:  Change oxygen saturation probe site  4.  Every 4-6 hours:  If on nasal continuous positive airway pressure, respiratory therapy assess nares and determine need for appliance change or resting period.  Outcome: Not Progressing     Problem: Safety - Adult  Goal: Free from fall injury  Outcome: Not Progressing     Problem: Chronic Conditions and Co-morbidities  Goal: Patient's chronic conditions and co-morbidity symptoms are monitored and maintained or improved  Outcome: Not Progressing     Problem: Pain  Goal: Verbalizes/displays adequate comfort level or baseline comfort level  Outcome: Not Progressing     Problem: Skin/Tissue Integrity  Goal: Absence of new skin breakdown  Description: 1.  Monitor for areas of redness and/or skin breakdown  2.  Assess vascular access sites hourly  3.  Every 4-6 hours minimum:  Change oxygen saturation probe site  4.  Every 4-6 hours:  If on nasal continuous positive airway pressure, respiratory therapy assess nares and determine need for appliance change or resting period.  Outcome: Not Progressing     Problem: Safety - Adult  Goal: Free from fall injury  Outcome: Not Progressing     
  Problem: Physical Therapy - Adult  Goal: By Discharge: Performs mobility at highest level of function for planned discharge setting.  See evaluation for individualized goals.  Description: Initiated  1/30/2024  to be met within 7-10 days.    1.  Patient will move from supine to sit and sit to supine , scoot up and down, and roll side to side in bed with modified independence.    2.  Patient will transfer from bed to chair and chair to bed with modified independence using the least restrictive device.  3.  Patient will perform sit to stand with modified independence.  4.  Patient will ambulate with modified independence for 150 feet with the least restrictive device.   5.  Patient will ascend/descend 2 stairs with b/l handrail(s) with modified independence.    PLOF: Pt lives with Wife in single story home with 2 steps to enter with b/l handrail.  Pt with past CVA with R sided residual deficits, utilizes SPC, independent with all gait and mobility.     Outcome: Progressing      PHYSICAL THERAPY EVALUATION    Patient: Amish Shetty (68 y.o. male)  Date: 1/30/2024  Primary Diagnosis: YARIEL (acute kidney injury) ([facility]) [N17.9]       Precautions: General Precautions, Fall Risk,  ,    ASSESSMENT :  Pt resting in bed upon entering room for PT evaluation, agreeable to treatment.  Pt with bruising on L forearm from fall at home, R forearm braced and wrapped which he stated was done at urgent care and is waiting to get the brace taken off here because nothing is wrong with his R arm.  Pt did have a past CVA affecting R side in 2019 with residual deficits in UE>LE.  Pt was supervision for all bed mobility, CGA with sit to stand with SPC and Vero with stand pivot transfers and gait.  Pt hold R foot/hip in ER with slight circumduction, decreased stance on R LE and weight shifted to the L.  Pt notes he has been receiving physical therapy on and off since having his stroke, would like to have HH upon being discharged.  Pt returned to 
Start hydralazine 50 mg twice daily  Call in 2 weeks with an update regarding your blood pressure  Avoid canned and processed foods that contain a lot of sodium    
   As per note of Neurology (Dr. Joseph Badillo), patient refuses statin agent because of potential side effects.     Right hemiparesis (HCC) 8/12/2019    Secondary hyperparathyroidism of renal origin (HCC) 8/18/2019    PTH (8/18/2019) = 101.7    Type 2 diabetes mellitus with stage 2 chronic kidney disease (HCC)     HbA1c (8/13/2019) = 6.6 no meds    Vitamin B12 deficiency anemia 8/14/2019    Vitamin B12 (8/14/2019) = 208    Vitamin D deficiency 8/19/2019    Vitamin D 25-Hydroxy (8/19/2019) = 16.2      Past Surgical History:   Procedure Laterality Date    COLONOSCOPY N/A 4/15/2019    COLONOSCOPY performed by Héctor Piedra MD at [facility] ENDOSCOPY    COLONOSCOPY N/A 12/20/2021    COLONOSCOPY with polypectomies performed by Héctor Piedra MD at [facility] ENDOSCOPY    CT BIOPSY RENAL  11/2/2023    CT BIOPSY RENAL 11/2/2023 [facility] RAD CT    TONSILLECTOMY         Home Situation:   Social/Functional History  Lives With: Spouse  Type of Home: House  Home Layout: One level  Home Access: Stairs to enter with rails  Entrance Stairs - Number of Steps: 2  Entrance Stairs - Rails: Both  Bathroom Shower/Tub: Tub/Shower unit  Bathroom Equipment: Grab bars in shower  Home Equipment: Cane, Walker, rolling  Has the patient had two or more falls in the past year or any fall with injury in the past year?: Yes  Receives Help From: Family  ADL Assistance: Independent  Ambulation Assistance: Independent  Transfer Assistance: Independent  Active : Yes  Mode of Transportation: Car  []  Right hand dominant   [x]  Left hand dominant    Cognitive/Behavioral Status:  Orientation  Overall Orientation Status: Within Functional Limits  Orientation Level: Oriented X4  Cognition  Overall Cognitive Status: WFL    Skin: mult bruises and skin tears  Edema: BLE    Vision/Perceptual:    Vision  Vision: Within Functional Limits        Coordination: BUE  Coordination: Generally decreased, functional (RUE deficits from hx of CVA 2019)        Balance:

## 2024-02-01 VITALS
WEIGHT: 228.84 LBS | DIASTOLIC BLOOD PRESSURE: 61 MMHG | OXYGEN SATURATION: 93 % | HEART RATE: 59 BPM | RESPIRATION RATE: 17 BRPM | SYSTOLIC BLOOD PRESSURE: 158 MMHG | BODY MASS INDEX: 33.89 KG/M2 | HEIGHT: 69 IN | TEMPERATURE: 98.7 F

## 2024-02-01 PROBLEM — I31.39 PERICARDIAL EFFUSION: Status: ACTIVE | Noted: 2024-02-01

## 2024-02-01 PROBLEM — Z86.73 HISTORY OF CVA (CEREBROVASCULAR ACCIDENT): Status: ACTIVE | Noted: 2024-02-01

## 2024-02-01 PROBLEM — I50.33 ACUTE ON CHRONIC HEART FAILURE WITH PRESERVED EJECTION FRACTION (HCC): Status: ACTIVE | Noted: 2024-02-01

## 2024-02-01 PROBLEM — R79.89 ELEVATED TROPONIN: Status: ACTIVE | Noted: 2024-02-01

## 2024-02-01 LAB
ALBUMIN SERPL-MCNC: 2.4 G/DL (ref 3.4–5)
ANION GAP SERPL CALC-SCNC: 5 MMOL/L (ref 3–18)
BUN SERPL-MCNC: 40 MG/DL (ref 7–18)
BUN/CREAT SERPL: 13 (ref 12–20)
CALCIUM SERPL-MCNC: 8.1 MG/DL (ref 8.5–10.1)
CHLORIDE SERPL-SCNC: 111 MMOL/L (ref 100–111)
CO2 SERPL-SCNC: 26 MMOL/L (ref 21–32)
CREAT SERPL-MCNC: 3.15 MG/DL (ref 0.6–1.3)
CRP SERPL-MCNC: 1.2 MG/DL (ref 0–0.3)
GLUCOSE BLD STRIP.AUTO-MCNC: 103 MG/DL (ref 70–110)
GLUCOSE BLD STRIP.AUTO-MCNC: 108 MG/DL (ref 70–110)
GLUCOSE SERPL-MCNC: 99 MG/DL (ref 74–99)
MAGNESIUM SERPL-MCNC: 1.9 MG/DL (ref 1.6–2.6)
PHOSPHATE SERPL-MCNC: 4.2 MG/DL (ref 2.5–4.9)
POTASSIUM SERPL-SCNC: 3.7 MMOL/L (ref 3.5–5.5)
SODIUM SERPL-SCNC: 142 MMOL/L (ref 136–145)
TSH SERPL DL<=0.05 MIU/L-ACNC: 1.78 UIU/ML (ref 0.36–3.74)

## 2024-02-01 PROCEDURE — 6360000002 HC RX W HCPCS: Performed by: INTERNAL MEDICINE

## 2024-02-01 PROCEDURE — 6370000000 HC RX 637 (ALT 250 FOR IP): Performed by: INTERNAL MEDICINE

## 2024-02-01 PROCEDURE — 94761 N-INVAS EAR/PLS OXIMETRY MLT: CPT

## 2024-02-01 PROCEDURE — 86140 C-REACTIVE PROTEIN: CPT

## 2024-02-01 PROCEDURE — 36415 COLL VENOUS BLD VENIPUNCTURE: CPT

## 2024-02-01 PROCEDURE — 80069 RENAL FUNCTION PANEL: CPT

## 2024-02-01 PROCEDURE — 84443 ASSAY THYROID STIM HORMONE: CPT

## 2024-02-01 PROCEDURE — 83735 ASSAY OF MAGNESIUM: CPT

## 2024-02-01 PROCEDURE — 2580000003 HC RX 258: Performed by: INTERNAL MEDICINE

## 2024-02-01 PROCEDURE — 82962 GLUCOSE BLOOD TEST: CPT

## 2024-02-01 RX ORDER — MAGNESIUM SULFATE 1 G/100ML
1000 INJECTION INTRAVENOUS ONCE
Status: COMPLETED | OUTPATIENT
Start: 2024-02-01 | End: 2024-02-01

## 2024-02-01 RX ORDER — BENZONATATE 100 MG/1
100 CAPSULE ORAL 3 TIMES DAILY PRN
Qty: 21 CAPSULE | Refills: 0 | Status: SHIPPED | OUTPATIENT
Start: 2024-02-01 | End: 2024-02-08

## 2024-02-01 RX ORDER — METOPROLOL SUCCINATE 50 MG/1
50 TABLET, EXTENDED RELEASE ORAL DAILY
Status: DISCONTINUED | OUTPATIENT
Start: 2024-02-01 | End: 2024-02-01

## 2024-02-01 RX ORDER — HYDRALAZINE HYDROCHLORIDE 100 MG/1
100 TABLET, FILM COATED ORAL 2 TIMES DAILY
Qty: 90 TABLET | Refills: 3 | Status: SHIPPED | OUTPATIENT
Start: 2024-02-01

## 2024-02-01 RX ORDER — BENZONATATE 100 MG/1
100 CAPSULE ORAL 3 TIMES DAILY PRN
Status: DISCONTINUED | OUTPATIENT
Start: 2024-02-01 | End: 2024-02-01 | Stop reason: HOSPADM

## 2024-02-01 RX ORDER — BUMETANIDE 0.25 MG/ML
1 INJECTION INTRAMUSCULAR; INTRAVENOUS DAILY
Status: DISCONTINUED | OUTPATIENT
Start: 2024-02-02 | End: 2024-02-01 | Stop reason: HOSPADM

## 2024-02-01 RX ORDER — BUMETANIDE 0.5 MG/1
0.5 TABLET ORAL 2 TIMES DAILY
Qty: 60 TABLET | Refills: 2 | Status: SHIPPED | OUTPATIENT
Start: 2024-02-01

## 2024-02-01 RX ADMIN — HYDRALAZINE HYDROCHLORIDE 100 MG: 50 TABLET, FILM COATED ORAL at 09:15

## 2024-02-01 RX ADMIN — FINASTERIDE 5 MG: 5 TABLET, FILM COATED ORAL at 09:15

## 2024-02-01 RX ADMIN — POTASSIUM CHLORIDE 20 MEQ: 1500 TABLET, EXTENDED RELEASE ORAL at 09:15

## 2024-02-01 RX ADMIN — HEPARIN SODIUM 5000 UNITS: 5000 INJECTION INTRAVENOUS; SUBCUTANEOUS at 05:36

## 2024-02-01 RX ADMIN — FERROUS SULFATE TAB 325 MG (65 MG ELEMENTAL FE) 325 MG: 325 (65 FE) TAB at 09:15

## 2024-02-01 RX ADMIN — AMLODIPINE BESYLATE 10 MG: 10 TABLET ORAL at 09:15

## 2024-02-01 RX ADMIN — BENZONATATE 100 MG: 100 CAPSULE ORAL at 00:34

## 2024-02-01 RX ADMIN — IRON SUCROSE 200 MG: 20 INJECTION, SOLUTION INTRAVENOUS at 09:23

## 2024-02-01 RX ADMIN — ASPIRIN 81 MG: 81 TABLET, CHEWABLE ORAL at 09:15

## 2024-02-01 RX ADMIN — SERTRALINE HYDROCHLORIDE 50 MG: 50 TABLET ORAL at 09:15

## 2024-02-01 RX ADMIN — BUMETANIDE 1 MG: 0.25 INJECTION, SOLUTION INTRAMUSCULAR; INTRAVENOUS at 09:16

## 2024-02-01 RX ADMIN — MAGNESIUM SULFATE HEPTAHYDRATE 1000 MG: 1 INJECTION, SOLUTION INTRAVENOUS at 09:24

## 2024-02-01 RX ADMIN — SODIUM CHLORIDE, PRESERVATIVE FREE 10 ML: 5 INJECTION INTRAVENOUS at 09:16

## 2024-02-01 ASSESSMENT — PAIN - FUNCTIONAL ASSESSMENT
PAIN_FUNCTIONAL_ASSESSMENT: ACTIVITIES ARE NOT PREVENTED
PAIN_FUNCTIONAL_ASSESSMENT: PREVENTS OR INTERFERES SOME ACTIVE ACTIVITIES AND ADLS

## 2024-02-01 ASSESSMENT — PAIN DESCRIPTION - LOCATION
LOCATION: GENERALIZED
LOCATION: GENERALIZED

## 2024-02-01 ASSESSMENT — PAIN DESCRIPTION - FREQUENCY
FREQUENCY: INTERMITTENT
FREQUENCY: INTERMITTENT

## 2024-02-01 ASSESSMENT — PAIN DESCRIPTION - PAIN TYPE
TYPE: ACUTE PAIN
TYPE: ACUTE PAIN

## 2024-02-01 ASSESSMENT — PAIN SCALES - GENERAL
PAINLEVEL_OUTOF10: 0

## 2024-02-01 ASSESSMENT — PAIN DESCRIPTION - DIRECTION
RADIATING_TOWARDS: NO
RADIATING_TOWARDS: NO

## 2024-02-01 ASSESSMENT — PAIN DESCRIPTION - DESCRIPTORS
DESCRIPTORS: ACHING
DESCRIPTORS: ACHING

## 2024-02-01 ASSESSMENT — PAIN DESCRIPTION - ONSET
ONSET: ON-GOING
ONSET: ON-GOING

## 2024-02-01 ASSESSMENT — PAIN DESCRIPTION - ORIENTATION
ORIENTATION: OTHER (COMMENT)
ORIENTATION: OTHER (COMMENT)

## 2024-02-01 NOTE — DISCHARGE INSTRUCTIONS
Hello you came in for volume overload. You expressed a desire to go home despite your numbers still not improving. Please follow up with Dr. Ramírez, take care.

## 2024-02-01 NOTE — CONSULTS
Diagnosis      Undetermined rhythm  Possible Junctional bradycardia  Anteroseptal infarct (cited on or before 25-FEB-2020)  Abnormal ECG  When compared with ECG of 02-NOV-2023 13:05,  Junctional rhythm has replaced Atrial fibrillation  Nonspecific T wave abnormality now evident in Inferior leads  Nonspecific T wave abnormality now evident in Anterior leads  Confirmed by MD Christian, Tru ([id number]) on 1/30/2024 4:28:53 PM       01/30/24    ECHO (TTE) COMPLETE (PRN CONTRAST/BUBBLE/STRAIN/3D) 01/31/2024  5:21 PM (Final)    Interpretation Summary    Left Ventricle: Normal left ventricular systolic function with a visually estimated EF of 50 - 55%. Left ventricle is dilated. Mildly increased wall thickness. Septal thickening. Normal wall motion.    Aortic Valve: Moderately calcified cusp. Mild stenosis of the aortic valve. AV mean gradient is 14 mmHg. AV peak gradient is 31 mmHg. AV peak velocity is 2.8 m/s. AV Velocity Ratio is 0.61. LVOT:AV VTI Index is 0.62. LVOT diameter is 2.2 cm. AV area by continuity VTI is 2.4 cm2. AV area by peak velocity is 2.5 cm2. AV Stroke Volume index is 61.7 mL/m2.    Mitral Valve: Mild regurgitation.    Left Atrium: Left atrium is dilated.    Right Atrium: Right atrium is dilated.    Pericardium: Moderate (1-2 cm) pericardial effusion present. No indication of cardiac tamponade.    Image quality is fair.    Signed by: Amish Pereira MD on 1/31/2024  5:21 PM    No results found for this or any previous visit.    No results found for this or any previous visit.    Xray Result (most recent):  XR CHEST LIMITED ONE VIEW 01/30/2024    Narrative  EXAM: XR CHEST 1 VIEW    CLINICAL INDICATION/HISTORY : Provided with order: SOB.      COMPARISON: August 16, 2019    TECHNIQUE: 1 VIEWS  Exam foot on the list to be read at approximately 1:20 PM.    FINDINGS:  EKG leads and wires overlie the chest.  No focal lung consolidation.  Heart is normal in size for technique.  No large pleural

## 2024-02-01 NOTE — HOME CARE
Received home health referral for [facility] for the following disciplines (SN / PT / OT).  Discharge orders noted for today.  Referral has been arranged by colleague and sent to central intake and scheduling.

## 2024-02-01 NOTE — CARE COORDINATION
2/1/24    Patient refusal of shower chair. No DME ordered.  To be followed by BS at discharge.    D/C order noted for today. Orders reviewed. No needs identified at this time. CM remains available if needed.     Priyanka Young  Case Management  
EMMANUEL spoke with patient and patient agreed to use [facility]. Augustus Barnes notified and patient accepted at [facility].     Bobbi Carrion BSW   Case Management    
AM    Patient Admission Status: Inpatient   Readmission Risk (Low < 19, Mod (19-27), High > 27): Readmission Risk Score: 17.8    Current PCP: Livia Nicole APRN - NP  PCP verified by CM?      Chart Reviewed: Yes      History Provided by: (P) Patient  Patient Orientation: (P) Alert and Oriented    Patient Cognition: (P) Alert    Hospitalization in the last 30 days (Readmission):  No    If yes, Readmission Assessment in  Navigator will be completed.    Advance Directives:      Code Status: Full Code   Patient's Primary Decision Maker is: Named in Scanned ACP Document    Primary Decision Maker: Mary Shetty - Spouse - [phone]    Discharge Planning:    Patient lives with: (P) Spouse/Significant Other Type of Home: (P) House  Primary Care Giver: (P) Self  Patient Support Systems include: Spouse/Significant Other, Family Members, [employer] [facility]   Current Financial resources: (P) Medicare  Current community resources:    Current services prior to admission: (P) None            Current DME:              Type of Home Care services:  (P) PT, OT    ADLS  Prior functional level: (P) Independent in ADLs/IADLs  Current functional level: (P) Independent in ADLs/IADLs    PT AM-PAC: 19 /24  OT AM-PAC: 18 /24    Family can provide assistance at DC: (P) Yes  Would you like Case Management to discuss the discharge plan with any other family members/significant others, and if so, who?    Plans to Return to Present Housing: (P) Yes  Other Identified Issues/Barriers to RETURNING to current housing:   Potential Assistance needed at discharge: (P) Home Care            Potential DME:    Patient expects to discharge to: (P) House  Plan for transportation at discharge:      Financial    Payor: MEDICARE / Plan: MEDICARE PART A AND B / Product Type: *No Product type* /     Does insurance require precert for SNF: No    Potential assistance Purchasing Medications:    Meds-to-Beds request:        [facility]/pharmacy #[id number] -

## 2024-02-01 NOTE — PROGRESS NOTES
conducted a Follow up consultation and Spiritual Assessment for Amish Shetty, who is a 68 y.o.,male.      The  provided the following Interventions:  Continued the relationship of care and support.   Listened empathically.  Offered prayer and assurance of continued prayer on patients behalf.   Chart reviewed.    The following outcomes were achieved:  Patient expressed gratitude for 's visit.    Assessment:  There are no further spiritual or [employer] issues which require Spiritual Care Services interventions at this time.     Plan:  Chaplains will continue to follow and will provide pastoral care on an as needed/requested basis.   recommends bedside caregivers page  on duty if patient shows signs of acute spiritual or emotional distress.       Chaplain Lsiandra Lennon  Spiritual Care   [phone]   
Advance Care Planning     Advance Care Planning Inpatient Note  Spiritual Care Department    Today's Date: 1/30/2024  Unit: [facility]    Received request from admission screening.  Upon review of chart and communication with care team, patient's decision making abilities are not in question.. Patient was/were present in the room during visit.    Health Care Decision Makers:       Primary Decision Maker: Mary Shetty - Spouse - [phone]  Summary:  Verified Documents  Verified Healthcare Decision Maker      Advance Care Planning Documents (Patient Wishes):  None     Assessment:     01/30/24 1156   Encounter Summary   Encounter Overview/Reason  Initial Encounter;Advance Care Planning   Service Provided For: Patient   Referral/Consult From: Multi-disciplinary team   Support System Spouse;Family members   Last Encounter  01/30/24  ([id number])   Complexity of Encounter Moderate   Begin Time 1153   End Time  1201   Total Time Calculated 8 min   Spiritual/Emotional needs   Type Spiritual Support   Advance Care Planning   Type ACP conversation   Assessment/Intervention/Outcome   Assessment Calm;Coping   Intervention Active listening   Outcome Encouraged   Plan and Referrals   Plan/Referrals Continue to visit, (comment)         Interventions:  Requested patient/family to submit existing document for our records: Healthcare Power of /Advance Directive Appointment of Health Care Agent    Care Preferences Communicated:   No    Outcomes/Plan:  ACP Discussion: Completed    Electronically signed by SAMUEL Kennedy on 1/30/2024 at 12:00 PM            
Attempted PT treatment. Patient seated EOB fully dressed. Transport chair present and patient pending discharge to home. Denies mobility concerns.   
Ewelina safely rounding. Spouse by the bedside with questions and concerns regarding plan of care regarding   Patient status charge nurse and unit management made aware. Provider page spouse had to leave spouse called via telephone no answer at this time voice mail left at this time [phone].  
Patient pending discharge at this time. Spouse by the bedside. Sling applied post discharge instructions no complications. All information given patient verbalized understand all content of the discharge summary discuss including  follow up that need to be schedule and current medications orders discuss patient and spouse verbalized understanding at this time. IV removed skin tear assess and dry and redressed at this time.All belongings accounted patient discharge home via wheelchair to home at this time.  
Recd pt as new admission on the Unit at 2315 via a gurney from [facility]. Report recd from FRANSISCA Galeano. Pt is Aox4 with no complaints or signs of pain or any distress.Oriented pt to room and Unit. VSS and assessment completed. Due to pt hx Tele box #14 applied. Skin tears to Left Fore arm and left upper elbow cleaned and dressed with 4x4 and kerlix. Assisted pt apply his home CPAP. Call light in reach, will continue to monitor. MD notified of new admission.  
Splint on RUE removed. Skin is intact. Ecchymosis and fragile skin noted under splint. Some edema assessed on hand, but extremity is WDL. Patient has residual weakness from CVA. Requested a sling for support. Care will continue.   
01/30/24  0249 01/31/24  0440    140   K 3.3* 3.9   * 113*   CO2 27 24   BUN 42* 38*   CREATININE 2.90* 2.78*   GLUCOSE 101* 85   PHOS  --  3.8   MG 2.2 2.1     PT/INR:No results for input(s): \"PROTIME\", \"INR\" in the last 72 hours.  APTT:No results for input(s): \"APTT\" in the last 72 hours.  LIVER PROFILE:  Recent Labs     01/30/24  0249   *   ALT 88*   BILITOT 0.4   ALKPHOS 96       Imaging:  US GALLBLADDER RUQ    Result Date: 1/30/2024  1.  Cholelithiasis. Gallbladder otherwise unremarkable. 2.  Increased echogenicity of the right kidney suggestive of chronic medical renal disease-correlate clinically.     XR CHEST 1 VIEW    Result Date: 1/30/2024  1.  No acute cardiopulmonary process      XR CHEST PORTABLE    Result Date: 1/29/2024  Clear lungs. Cardiomegaly and/or pericardial effusion. Signed By: Vini Dukes MD on 1/29/2024 11:15 AM      Current Medications:  Current Facility-Administered Medications: perflutren lipid microspheres (DEFINITY) injection 2 mL, 2 mL, IntraVENous, ONCE PRN  amLODIPine (NORVASC) tablet 10 mg, 10 mg, Oral, Daily  aspirin chewable tablet 81 mg, 81 mg, Oral, Daily with breakfast  ferrous sulfate (IRON 325) tablet 325 mg, 325 mg, Oral, Daily with breakfast  finasteride (PROSCAR) tablet 5 mg, 5 mg, Oral, Daily  hydrALAZINE (APRESOLINE) tablet 75 mg, 75 mg, Oral, Q8H  sertraline (ZOLOFT) tablet 50 mg, 50 mg, Oral, Daily  sodium chloride flush 0.9 % injection 5-40 mL, 5-40 mL, IntraVENous, 2 times per day  sodium chloride flush 0.9 % injection 5-40 mL, 5-40 mL, IntraVENous, PRN  0.9 % sodium chloride infusion, , IntraVENous, PRN  potassium chloride 10 mEq/100 mL IVPB (Peripheral Line), 10 mEq, IntraVENous, PRN  magnesium sulfate 2000 mg in 50 mL IVPB premix, 2,000 mg, IntraVENous, PRN  ondansetron (ZOFRAN-ODT) disintegrating tablet 4 mg, 4 mg, Oral, Q8H PRN **OR** ondansetron (ZOFRAN) injection 4 mg, 4 mg, IntraVENous, Q6H PRN  polyethylene glycol (GLYCOLAX) packet 
Rectal Q6H PRN    heparin (porcine) injection 5,000 Units  5,000 Units SubCUTAneous 3 times per day    insulin lispro (HUMALOG) injection vial 0-4 Units  0-4 Units SubCUTAneous TID WC    insulin lispro (HUMALOG) injection vial 0-4 Units  0-4 Units SubCUTAneous Nightly    glucose chewable tablet 16 g  4 tablet Oral PRN    dextrose bolus 10% 125 mL  125 mL IntraVENous PRN    Or    dextrose bolus 10% 250 mL  250 mL IntraVENous PRN    glucagon injection 1 mg  1 mg SubCUTAneous PRN    dextrose 10 % infusion   IntraVENous Continuous PRN    potassium chloride (KLOR-CON M) extended release tablet 20 mEq  20 mEq Oral Daily       Objective:     Vitals:    02/01/24 1100   BP: (!) 158/61   Pulse: 59   Resp: 17   Temp: 98.7 °F (37.1 °C)   SpO2: 93%       Intake/Output Summary (Last 24 hours) at 2/1/2024 1155  Last data filed at 2/1/2024 0932  Gross per 24 hour   Intake 960 ml   Output 1950 ml   Net -990 ml         Physical Examination:       RS: Chest is bilateral equal, no wheezing / rales / crackles  CVS: S1-S2 heard  Abdomen: Soft, Non tender,   Extremities: + edema,   CNS: Awake & follows commands,  HEENT: Head is atraumatic, PERRLA, conjunctiva pink & non icteric. No JVD or carotid bruit        Data Review:      Labs:     Hematology:   Recent Labs     01/30/24  0249 01/31/24  0440   WBC 8.3 8.7   HGB 8.0* 7.6*   HCT 25.8* 25.1*    247       Chemistry:   Recent Labs     01/30/24  0249 01/31/24  0440 02/01/24  0158   CREATININE 2.90* 2.78* 3.15*   CALCIUM 8.0* 8.4* 8.1*   LABALBU 2.3* 2.2* 2.4*   K 3.3* 3.9 3.7    140 142   * 113* 111   CO2 27 24 26   PHOS  --  3.8 4.2          Images:    US GALLBLADDER RUQ    Result Date: 1/30/2024  1.  Cholelithiasis. Gallbladder otherwise unremarkable. 2.  Increased echogenicity of the right kidney suggestive of chronic medical renal disease-correlate clinically.     XR CHEST 1 VIEW    Result Date: 1/30/2024  1.  No acute cardiopulmonary process           EKG Results for 
Atrial Rate 33 BPM    QRS Duration 102 ms    Q-T Interval 522 ms    QTc Calculation (Bazett) 441 ms    R Axis 51 degrees    T Axis 23 degrees    Diagnosis       Undetermined rhythm  Possible Junctional bradycardia  Anteroseptal infarct (cited on or before 25-FEB-2020)  Abnormal ECG  When compared with ECG of 02-NOV-2023 13:05,  Junctional rhythm has replaced Atrial fibrillation  Nonspecific T wave abnormality now evident in Inferior leads  Nonspecific T wave abnormality now evident in Anterior leads  Confirmed by MD Christian, Tru ([id number]) on 1/30/2024 4:28:53 PM          I have personally reviewed the old medical records and patient's labs    Plan / Recommendation:      1. 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 JEEVAN GARCIA MD  Nephrology  1/31/2024          
110 mg/dL   Renal Function Panel    Collection Time: 02/01/24  1:58 AM   Result Value Ref Range    Sodium 142 136 - 145 mmol/L    Potassium 3.7 3.5 - 5.5 mmol/L    Chloride 111 100 - 111 mmol/L    CO2 26 21 - 32 mmol/L    Anion Gap 5 3.0 - 18 mmol/L    Glucose 99 74 - 99 mg/dL    BUN 40 (H) 7.0 - 18 MG/DL    Creatinine 3.15 (H) 0.6 - 1.3 MG/DL    Bun/Cre Ratio 13 12 - 20      Est, Glom Filt Rate 21 (L) >60 ml/min/1.73m2    Calcium 8.1 (L) 8.5 - 10.1 MG/DL    Phosphorus 4.2 2.5 - 4.9 MG/DL    Albumin 2.4 (L) 3.4 - 5.0 g/dL   Magnesium    Collection Time: 02/01/24  1:58 AM   Result Value Ref Range    Magnesium 1.9 1.6 - 2.6 mg/dL   POCT Glucose    Collection Time: 02/01/24  7:26 AM   Result Value Ref Range    POC Glucose 103 70 - 110 mg/dL

## 2024-02-02 ENCOUNTER — HOME CARE VISIT (OUTPATIENT)
Age: 69
End: 2024-02-02

## 2024-02-04 ENCOUNTER — HOME CARE VISIT (OUTPATIENT)
Age: 69
End: 2024-02-04

## 2024-02-04 VITALS
OXYGEN SATURATION: 99 % | DIASTOLIC BLOOD PRESSURE: 64 MMHG | TEMPERATURE: 98.1 F | SYSTOLIC BLOOD PRESSURE: 148 MMHG | RESPIRATION RATE: 20 BRPM | HEART RATE: 61 BPM

## 2024-02-04 PROBLEM — R60.1 ANASARCA: Status: ACTIVE | Noted: 2024-02-04

## 2024-02-04 PROBLEM — I10 PRIMARY HYPERTENSION: Status: ACTIVE | Noted: 2024-02-04

## 2024-02-04 PROBLEM — S51.819S: Status: ACTIVE | Noted: 2024-02-04

## 2024-02-04 PROBLEM — N04.9 NEPHROTIC SYNDROME: Status: ACTIVE | Noted: 2024-02-04

## 2024-02-04 PROCEDURE — G0299 HHS/HOSPICE OF RN EA 15 MIN: HCPCS

## 2024-02-04 PROCEDURE — 0221000100 HH NO PAY CLAIM PROCEDURE

## 2024-02-05 ENCOUNTER — HOME CARE VISIT (OUTPATIENT)
Age: 69
End: 2024-02-05

## 2024-02-05 PROCEDURE — G0151 HHCP-SERV OF PT,EA 15 MIN: HCPCS

## 2024-02-05 NOTE — HOME HEALTH
Admitted care of Mr. Shetty due to recent hospitalization for YARIEL.  Patient does not require skilled nursing services at this time, knowledgeable of medications and disease processes and will be discussing kidney function with nephrologist on 2/16/24.  Medications reconciled and all medications are available.  PT/OT to perform final discharge.

## 2024-02-06 ASSESSMENT — ENCOUNTER SYMPTOMS
HEMOPTYSIS: 0
STOOL DESCRIPTION: FORMED

## 2024-02-06 NOTE — HOME HEALTH
caregiver(s) availability and willingness to provide care.    Primary caregiver is available occasional short term and is able to assist with  meal prep and setup, grocery shopping, household chores.     Patient has a PLOF of indep with ADLs, bed mobility, transfers and ambulation using cane both for household and community distances.     During this visit, patient indep in bed mobility, supervision/touching  assistance in safe transfers to and from bed, chair and toilet using cane with minimal verbal cues for proper hand placements and techniques.     Patient is limited with ambulation to 50 ft using cane requiring supervision/touching. Impairments in gait include forward lean, downward gaze, decreased L hip/knee flexion during swing phase, hyperextension of R knee during stance phase, unequal step length, decreased nell, - heel strike on L, wide TISHA, antalgic gait, ataxic gait.     Deferred step management assessment d/t safety reasons.   PATIENT EDUCATION PROVIDED THIS VISIT TO INCLUDE:   FALL PREVENTION TECHNIQUES: monitor medication that may alter mental status,  keeping walking pathways clean and clear of clutter and throw rugs, keeping night lights on for ability to see and easily, good visibility for safe transitioning thresholds and proper use and good compliance of using AD.  PAIN MANAGEMENT TECHNIQUES:  positioning and relaxation.  PRESSURE ULCER PREVENTION TECHNIQUES:  proper positioning strategies including frequency of repositioning, prevention of shear and friction, appropriate skin hygiene and protection from moisture.  ENERGY CONSERVATION TECHNIQUES:  rest, slow down, pacing, and complete tasks in manageable steps.  SWELLING MANAGEMENT: elevate  LEs,  ankle pumps.  PRECAUTION: FALLS    PATIENT LEVEL OF UNDERSTANDING OF EDUCATION PROVIDED: Patient verbalized understanding and able to partially teach back information provided.    PATIENT RESPONSE TO PROCEDURE PERFORMED: Patient exhibiting

## 2024-02-07 VITALS
SYSTOLIC BLOOD PRESSURE: 148 MMHG | OXYGEN SATURATION: 99 % | TEMPERATURE: 98.9 F | HEART RATE: 50 BPM | RESPIRATION RATE: 14 BRPM | DIASTOLIC BLOOD PRESSURE: 68 MMHG

## 2024-02-08 ENCOUNTER — HOME CARE VISIT (OUTPATIENT)
Age: 69
End: 2024-02-08

## 2024-02-08 PROCEDURE — G0157 HHC PT ASSISTANT EA 15: HCPCS

## 2024-02-09 VITALS
OXYGEN SATURATION: 93 % | SYSTOLIC BLOOD PRESSURE: 145 MMHG | TEMPERATURE: 98.1 F | DIASTOLIC BLOOD PRESSURE: 88 MMHG | HEART RATE: 61 BPM

## 2024-02-09 ASSESSMENT — ENCOUNTER SYMPTOMS: PAIN LOCATION - PAIN QUALITY: DULL ACHE

## 2024-02-10 NOTE — HOME HEALTH
SUBJECTIVE: Pt c/o pain in left hip, no falls no changes in medications   CAREGIVER INVOLVEMENT/ASSISTANCE NEEDED FOR: Spouse assist pt as needed   OBJECTIVE: See interventions  PATIENT EDUCATION PROVIDED THIS VISIT: Pt educated on fall prevention strategies, pain management, instructed in energy conservation and pursed lip breathing pt instructed to take rest periods   PATIENT RESPONSE TO EDUCATION: Pt verbalizes understanding of education   PATIENT RESPONSE TO TREATMENT/ASSESSMENT OF PROGRESS TOWARD GOALS: Established HEP consisting of standing strengthening to bilateal pt instructed to perform 2x day, walk every 1-2 hours with AD as tolerated. Transfers fron dining chair requires assist and instruction to improve body mechanics and hand placment, gait tng surfaces w/ AD VCs to slow nell and to take smaller steps. Pt c/o 6/10 fatigue following tx session. Skilled PT intervention needed to address pain and swelling, deficits in strength, gait, transfers and balance to improve functional mobility and safety and lower risk for falls or injury.   PLAN FOR NEXT VISIT: Next visit will address balance, gait and strength  DISCHARGE PLANS: POC and Discharge plans discussed pt understands and agrees eventual DC once goals have been met or max HC benefits have been achieved. 9 visits remaining anticipated DC 3/12/2024

## 2024-02-12 ENCOUNTER — OFFICE VISIT (OUTPATIENT)
Age: 69
End: 2024-02-12
Payer: MEDICARE

## 2024-02-12 VITALS
BODY MASS INDEX: 33.03 KG/M2 | HEIGHT: 69 IN | WEIGHT: 223 LBS | DIASTOLIC BLOOD PRESSURE: 60 MMHG | HEART RATE: 64 BPM | OXYGEN SATURATION: 96 % | SYSTOLIC BLOOD PRESSURE: 124 MMHG

## 2024-02-12 DIAGNOSIS — I48.91 ATRIAL FIBRILLATION, UNSPECIFIED TYPE (HCC): Primary | ICD-10-CM

## 2024-02-12 DIAGNOSIS — I50.33 ACUTE ON CHRONIC HEART FAILURE WITH PRESERVED EJECTION FRACTION (HCC): ICD-10-CM

## 2024-02-12 DIAGNOSIS — Z79.899 ON STATIN THERAPY DUE TO RISK OF FUTURE CARDIOVASCULAR EVENT: ICD-10-CM

## 2024-02-12 PROCEDURE — 3017F COLORECTAL CA SCREEN DOC REV: CPT | Performed by: INTERNAL MEDICINE

## 2024-02-12 PROCEDURE — 99204 OFFICE O/P NEW MOD 45 MIN: CPT | Performed by: INTERNAL MEDICINE

## 2024-02-12 PROCEDURE — G8417 CALC BMI ABV UP PARAM F/U: HCPCS | Performed by: INTERNAL MEDICINE

## 2024-02-12 PROCEDURE — G8428 CUR MEDS NOT DOCUMENT: HCPCS | Performed by: INTERNAL MEDICINE

## 2024-02-12 PROCEDURE — 1111F DSCHRG MED/CURRENT MED MERGE: CPT | Performed by: INTERNAL MEDICINE

## 2024-02-12 PROCEDURE — 3078F DIAST BP <80 MM HG: CPT | Performed by: INTERNAL MEDICINE

## 2024-02-12 PROCEDURE — G8484 FLU IMMUNIZE NO ADMIN: HCPCS | Performed by: INTERNAL MEDICINE

## 2024-02-12 PROCEDURE — 3074F SYST BP LT 130 MM HG: CPT | Performed by: INTERNAL MEDICINE

## 2024-02-12 PROCEDURE — 1123F ACP DISCUSS/DSCN MKR DOCD: CPT | Performed by: INTERNAL MEDICINE

## 2024-02-12 PROCEDURE — 1036F TOBACCO NON-USER: CPT | Performed by: INTERNAL MEDICINE

## 2024-02-12 RX ORDER — ATORVASTATIN CALCIUM 40 MG/1
40 TABLET, FILM COATED ORAL DAILY
Qty: 90 TABLET | Refills: 3 | Status: SHIPPED | OUTPATIENT
Start: 2024-02-12

## 2024-02-12 RX ORDER — FUROSEMIDE 20 MG/1
20 TABLET ORAL 2 TIMES DAILY
COMMUNITY

## 2024-02-12 NOTE — PROGRESS NOTES
[facility]    Amish Shetty is 68 y.o. male     Patient is here today for cardiac evaluation  Denies prior history of MI or CHF  Patient was sent to me for evaluation of aortic stenosis  Patient had echocardiogram in 01/2024 which showed mild aortic stenosis with mean gradient of 18 mmHg  Patient had a CVA in 2019 with residual weakness and some aphasia.  Patient has occasional lower extremity swelling.  No chest pain or chest tightness.  No significant shortness of breath.  Denies orthopnea or PND.  Denies any nausea, vomiting, abdominal pain, fever, chills, sputum production. 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     On Allopurinol    Chronic hypokalemia     Persistent chronic hypokalemia + hypertension; ?primary hyperaldosteronism    CKD (chronic kidney disease) stage 2, GFR 60-89 ml/min 8/15/2019    Elevated prostate specific antigen (PSA) 7/21/2011    Hypertension     Iron deficiency anemia 8/14/2019    Anemia work-up (8/14/2019) showed serum iron 22, TIBC 371, iron % saturation 6, ferritin 34, reticulocyte count 1.4     Obesity, Class I, BMI 30-34.9     Obstructive sleep apnea on CPAP     Primary osteoarthritis of right ankle 8/22/2019    X-ray of the right ankle (8/22/2019) showed no acute fracture or subluxation; advanced degenerative changes at the tibiotalar joint; old fracture fragment vs. accessory ossicle in the inferior aspect of the lateral malleolus; soft tissue swelling around the ankle.    Pure hypercholesterolemia 08/15/2019    Lipid profile (8/13/2019) showed , , HDL 42, ; Lipid profile (8/14/2019) showed , TC

## 2024-02-13 ENCOUNTER — HOME CARE VISIT (OUTPATIENT)
Age: 69
End: 2024-02-13
Payer: MEDICARE

## 2024-02-13 VITALS
SYSTOLIC BLOOD PRESSURE: 148 MMHG | DIASTOLIC BLOOD PRESSURE: 78 MMHG | OXYGEN SATURATION: 97 % | RESPIRATION RATE: 18 BRPM | HEART RATE: 65 BPM | TEMPERATURE: 98 F

## 2024-02-13 LAB
CHOLEST SERPL-MCNC: 131 MG/DL (ref 100–199)
HDLC SERPL-MCNC: 51 MG/DL
LDLC SERPL CALC-MCNC: 58 MG/DL (ref 0–99)
SPECIMEN STATUS REPORT: NORMAL
TRIGL SERPL-MCNC: 121 MG/DL (ref 0–149)
VLDLC SERPL CALC-MCNC: 22 MG/DL (ref 5–40)

## 2024-02-13 PROCEDURE — G0152 HHCP-SERV OF OT,EA 15 MIN: HCPCS

## 2024-02-13 NOTE — HOME HEALTH
Skilled Reason for admission/summary of clinical condition: 68 year old male referred to OT s/p hospitalization due to YARIEL    Caregiver Involvement: Pt lives with spouse and sister in law who are able to assist with ADLs, IADLs, and functional mobility    PMHx: CKD, iron deficiency anemia, obesity, obstructive sleep apnea. Hypertensive heart and kidney disease    PLOF: Pt as ambulating using SPC for household and community distances. Pt was independent with ADLs and functional mobility    Medications: Pt reports no changes to medications since last reviewed and educated to continue as directed per MD.    SUBJECTIVE: Pt reports no pain on this date. Pt with residual RUE weakness and decreased coordation from CVA in 2019.      DME ORDERED/RECOMMENEDED: grab bars, 3:1 commode, tub transfer bench     OBJECTIVE:  BATHING: Pt has tub shower with grab bars. Pt completes bathing with supervision. Pt reports he had a shower chair, however gave it away due to pt not wanting to use assistive devices   TOILETING: Pt supervision for toileting on standard toilet. Pt with low toilet height and would benefit from 3:1 commode over toilet to increase height for pt increased safety  UB DRESSING: Pt supervision for upper body dressing to anna/doff shirts  LB DRESSING: Pt CGA for lower body dressing to anna/doff socks, shoes and pants  GROOMING: Pt supervision for grooming for hair care, oral care and washing hands/face while standing at the sink  FEEDING: Pt setup assistance for self feeding     Modified Alfredito RPE 7/10 after performing ambulation, transfers, and I/ADL assessment. Pt visibly out of breath      RUE MMT: 2+/5  RUE AROM: Pt with active shoulder flexion ~45 degrees    LUE MMT: 4/5  LUE AROM: WFL    VISION: Pt vision is WFL reading glasses for reading directions/medications and to navigate their home environment safely.     BALANCE: Pt with good sitting balance/tolerance. Pt with fair- standing balance/tolerance.     BUE

## 2024-02-14 ENCOUNTER — HOME CARE VISIT (OUTPATIENT)
Age: 69
End: 2024-02-14
Payer: MEDICARE

## 2024-02-14 VITALS
HEART RATE: 68 BPM | TEMPERATURE: 98.2 F | OXYGEN SATURATION: 98 % | SYSTOLIC BLOOD PRESSURE: 157 MMHG | DIASTOLIC BLOOD PRESSURE: 81 MMHG

## 2024-02-14 PROCEDURE — G0157 HHC PT ASSISTANT EA 15: HCPCS

## 2024-02-14 NOTE — HOME HEALTH
SUBJECTIVE: No complaints of pain, no falls no changes in medications   CAREGIVER INVOLVEMENT/ASSISTANCE NEEDED FOR: Spouse assist pt as needed   OBJECTIVE: See interventions  PATIENT EDUCATION PROVIDED THIS VISIT: Pt educated on fall prevention strategies, instructed in energy conservation and pursed lip breathing, taking appropriate rest periods   PATIENT RESPONSE TO EDUCATION: Pt verbalizes understanding of education   PATIENT RESPONSE TO TREATMENT/ASSESSMENT OF PROGRESS TOWARD GOALS: Pt instructed in standing strengthening to bilateal LEs at counter for support increased to 15 reps x 1 set pt able to return demonstrate with VCs to slow nell, transfers require minimal asssit w/ taxing effort. Assist and Rails needed for stair negotiation VCs for sequencing pt ascends and descends on opposite sides. Gait tng outside on uneven surfaces w/ quad cane pt take large step on RLE VCs to slow slow down and take his time, maintain wide base of support when turning.  Addressed balance reaching across/below midline no loss of balance.  Pt c/o fatigue becomes SOB with minimal exertion instructed to take appropriate rest periods to avoid over exertion.   Skilled PT intervention needed to address pain and swelling, deficits in strength, gait, transfers and balance to improve functional mobility and safety and lower risk for falls or injury.   PLAN FOR NEXT VISIT: Next visit will address balance, gait, strength and endurance  DISCHARGE PLANS: POC and Discharge plans discussed pt understands and agrees eventual DC once goals have been met or max HC benefits have been achieved. 8 visits remaining anticipated DC 3/12/2024

## 2024-02-15 ENCOUNTER — HOME CARE VISIT (OUTPATIENT)
Age: 69
End: 2024-02-15
Payer: MEDICARE

## 2024-02-15 VITALS
TEMPERATURE: 98.2 F | HEART RATE: 56 BPM | SYSTOLIC BLOOD PRESSURE: 147 MMHG | DIASTOLIC BLOOD PRESSURE: 63 MMHG | OXYGEN SATURATION: 99 %

## 2024-02-15 PROCEDURE — G0158 HHC OT ASSISTANT EA 15: HCPCS

## 2024-02-16 ENCOUNTER — HOME CARE VISIT (OUTPATIENT)
Age: 69
End: 2024-02-16
Payer: MEDICARE

## 2024-02-16 PROCEDURE — G0157 HHC PT ASSISTANT EA 15: HCPCS

## 2024-02-16 NOTE — HOME HEALTH
SUBJECTIVE: My R hand is always this swollen.    CAREGIVER INVOLVEMENT/ASSISTANCE NEEDED FOR: The patient's wife assists wiht ADL/IADLs as needed.  The wife was present.  .  OBJECTIVE:  See interventions.  PATIENT EDUCATION PROVIDED THIS VISIT: Treatment focused on functional transfers, B UE strengthening and R hand fine motor control, and energy conservation strategies.    PATIENT RESPONSE TO EDUCATION PROVIDED: The patient was trained on the home exercise program strengthening BUE and R UE fine motor coordination;  the patient demonstrated the the exercises from the handout with S and verbal cues for technique and was trained to perform table top exercises with the RUE with gravity eliminated.  The patient is able to perform R shoulder flexion upto 90 degrees and a forms a composite fist with the Rhand, but is unable to extend the digits upon request. The patient was trained on energy conservation while performing bathing, dressing, and setup such as set clothing out night before, gather items required to perform task in one trip, sit while performing tasks, take rest breaks as needed, perform shower/bathing on days with now other appointments or activities shceduled, etc;   following the training the patient was able to recall two of the energy conserving strategies.  The patient was trained in safe transfers using appropriate body mechanics and the necessary equipment; following the training the patient performed sit to stands from the kitchen table chair without armrests with SBA using quadcane for support,  transfers on and off the commode are also SBA.  The patient performed a static standing task for ~35 seconds without loss of balance using the quad cane for support.    PATIENT RESPONSE TO TREATMENT: The patient tolerated treatment well, maintaining vital signs within acceptable parameters and without complaints of increased pain level.  .  ASSESSMENT OF PROGRESS TOWARD GOALS: The patient is making progres

## 2024-02-17 VITALS
HEART RATE: 79 BPM | OXYGEN SATURATION: 96 % | DIASTOLIC BLOOD PRESSURE: 87 MMHG | SYSTOLIC BLOOD PRESSURE: 147 MMHG | TEMPERATURE: 98 F

## 2024-02-17 NOTE — HOME HEALTH
SUBJECTIVE: No complaints of pain, no falls no changes in medications, pt had appt with Neuro today no changes in medications   CAREGIVER INVOLVEMENT/ASSISTANCE NEEDED FOR: Spouse assist pt as needed   OBJECTIVE: See interventions  PATIENT EDUCATION PROVIDED THIS VISIT: Pt educated on fall prevention strategies, energy conservation   PATIENT RESPONSE TO EDUCATION: Pt verbalizes understanding of education   PATIENT RESPONSE TO TREATMENT/ASSESSMENT OF PROGRESS TOWARD GOALS: Pt instructed in seated strengthening to bilateral LEs with instruction for 5 second hold, pt able to return demonstrate safely FTSTS=23 seconds support ton standing with wide base of support no loss of balance, Tinetti 15/28, addressed balance in standing reaching over head, across/below midline with wide base of support pt tolerated well with no loss of balance, side stepping and retro walking with instruction to slow nell for safety.  Gait tng on even surfaces w/ quad cane instructed to decrease stride length on right and slow nell to improve safety, continued tng needed. Pt c/o fatigue becomes SOB with minimal exertion instructed to take appropriate rest periods to avoid over exertion. Skilled PT intervention needed to address pain and swelling, deficits in strength, gait, transfers and balance to improve functional mobility and safety and lower risk for falls or injury.   PLAN FOR NEXT VISIT: Next visit will address balance, gait, strength and endurance  DISCHARGE PLANS: POC and Discharge plans discussed pt understands and agrees eventual DC once goals have been met or max HC benefits have been achieved. 7 visits remaining anticipated DC 3/12/2024

## 2024-02-19 ENCOUNTER — HOME CARE VISIT (OUTPATIENT)
Age: 69
End: 2024-02-19
Payer: MEDICARE

## 2024-02-19 VITALS
SYSTOLIC BLOOD PRESSURE: 189 MMHG | OXYGEN SATURATION: 98 % | DIASTOLIC BLOOD PRESSURE: 90 MMHG | TEMPERATURE: 98.6 F | HEART RATE: 65 BPM

## 2024-02-19 VITALS
HEART RATE: 65 BPM | SYSTOLIC BLOOD PRESSURE: 189 MMHG | TEMPERATURE: 98.6 F | DIASTOLIC BLOOD PRESSURE: 90 MMHG | OXYGEN SATURATION: 98 %

## 2024-02-19 PROCEDURE — G0158 HHC OT ASSISTANT EA 15: HCPCS

## 2024-02-19 ASSESSMENT — ENCOUNTER SYMPTOMS
PAIN LOCATION - PAIN QUALITY: DULL ACHY
PAIN LOCATION - PAIN QUALITY: DULL, ACHY

## 2024-02-19 NOTE — HOME HEALTH
SUBJECTIVE: My L hamstring is really sore today.    CAREGIVER INVOLVEMENT/ASSISTANCE NEEDED FOR: The patient's wife is assisting with ADL/IADLs as needed. The wife was present.  .  OBJECTIVE:  See interventions.  PATIENT EDUCATION PROVIDED THIS VISIT: No treatment due to elevated BP.  PATIENT RESPONSE TO EDUCATION PROVIDED: No treatment due to elevated BP.  PATIENT RESPONSE TO TREATMENT: No treatment due to elevated BP.  .  ASSESSMENT OF PROGRESS TOWARD GOALS: There was no progress today due to treatment withheld.  .  PLAN FOR NEXT VISIT: Focus on HEP, compensatory techniques performing IADL tasks such as meal preparation.    THE FOLLOWING DISCHARGE PLANNING WAS DISCUSSED WITH THE PATIENT/CAREGIVER: Discharge the patient to self and wife, with HEP, when all goals are met or maximum potential has been reached.  There are 4 visists remaining.

## 2024-02-21 ENCOUNTER — HOME CARE VISIT (OUTPATIENT)
Age: 69
End: 2024-02-21
Payer: MEDICARE

## 2024-02-21 VITALS
DIASTOLIC BLOOD PRESSURE: 79 MMHG | HEART RATE: 70 BPM | OXYGEN SATURATION: 99 % | SYSTOLIC BLOOD PRESSURE: 153 MMHG | TEMPERATURE: 99.3 F

## 2024-02-21 PROCEDURE — G0157 HHC PT ASSISTANT EA 15: HCPCS

## 2024-02-21 ASSESSMENT — ENCOUNTER SYMPTOMS: PAIN LOCATION - PAIN QUALITY: SORE, ACHING

## 2024-02-21 NOTE — HOME HEALTH
SUBJECTIVE: Pt c/o pain in RLE states he thinks he pulled a muscle getting out of the car, no falls no changes in medicaitons   CAREGIVER INVOLVEMENT/ASSISTANCE NEEDED FOR: Spouse assist pt as needed   OBJECTIVE: See interventions  PATIENT EDUCATION PROVIDED THIS VISIT: Pt educated on fall prevention strategies and pain management   PATIENT RESPONSE TO EDUCATION: Pt able to teach back education   PATIENT RESPONSE TO TREATMENT/ASSESSMENT OF PROGRESS TOWARD GOALS: Temp slightly elevated pt has cold instructed to contact PCP if  temp increases to 100.4 or higher. Pt peformed standing strengening to bilateral LEs with SBA provided pt able to recall all exercises and perform with UE support added 3 exercises with resistive band,  gait tng on even surfaces w/ small based quad cane pt take large step on RLE decreaed foot clearance toes drag. Addressed balance with UE supporrt pt tolerated well without loss of balance. Pt progressing well toward established goals. Skilled PT intervention needed to address deficits in strength, gait, transfers, balance and endurance to improve safety and lower risk for falls or injury.   PLAN FOR NEXT VISIT: Next visit will address balance   DISCHARGE PLANS: POC and Discharge plans discussed pt understands and agrees eventual DC once goals have been met or max HC benefits have been achieved. 6 visits remaining anticipated DC 3/12/2024

## 2024-02-22 ENCOUNTER — HOME CARE VISIT (OUTPATIENT)
Age: 69
End: 2024-02-22
Payer: MEDICARE

## 2024-02-22 PROCEDURE — G0158 HHC OT ASSISTANT EA 15: HCPCS

## 2024-02-23 ENCOUNTER — HOME CARE VISIT (OUTPATIENT)
Age: 69
End: 2024-02-23
Payer: MEDICARE

## 2024-02-23 VITALS — OXYGEN SATURATION: 96 % | TEMPERATURE: 48.2 F | HEART RATE: 80 BPM

## 2024-02-23 PROCEDURE — G0157 HHC PT ASSISTANT EA 15: HCPCS

## 2024-02-23 ASSESSMENT — ENCOUNTER SYMPTOMS: PAIN LOCATION - PAIN QUALITY: ACHING

## 2024-02-24 NOTE — HOME HEALTH
SUBJECTIVE: Pt continues to c/o RLE pain, no falls or changes in medications   CAREGIVER INVOLVEMENT/ASSISTANCE NEEDED FOR: Spouse assist pt as needed   OBJECTIVE: See interventions  PATIENT EDUCATION PROVIDED THIS VISIT: Pt educated on fall prevention strategies   PATIENT RESPONSE TO EDUCATION: Pt able to teach back education   PATIENT RESPONSE TO TREATMENT/ASSESSMENT OF PROGRESS TOWARD GOALS: Pt continues to complain of RLE pain states he pulled muscle in leg, HEP performed on balance board today to address balance as well as strength w/UE support, addressed balance on balance board pt tolerated well without loss of balance, improved FTSTS 22 and TUG 35. Ind sit to stand transfers. Gait tng on even surfaces with small based quad cane VCs to slow nell and for shorter step length. Pt progressing well toward established on target for anticipted DC. Skilled PT intervention needed to address deficits in strength, gait, transfers, balance and endurance to improve safety and lower risk for falls or injury.   PLAN FOR NEXT VISIT: Sup visit with PT   DISCHARGE PLANS: POC and Discharge plans discussed pt understands and agrees eventual DC once goals have been met or max HC benefits have been achieved. 5 visits remaining anticipated DC 3/12/2024

## 2024-02-28 ENCOUNTER — HOME CARE VISIT (OUTPATIENT)
Age: 69
End: 2024-02-28
Payer: MEDICARE

## 2024-02-28 VITALS
SYSTOLIC BLOOD PRESSURE: 159 MMHG | TEMPERATURE: 98.4 F | OXYGEN SATURATION: 96 % | DIASTOLIC BLOOD PRESSURE: 80 MMHG | RESPIRATION RATE: 17 BRPM | HEART RATE: 66 BPM

## 2024-02-28 PROCEDURE — G0152 HHCP-SERV OF OT,EA 15 MIN: HCPCS

## 2024-02-28 NOTE — HOME HEALTH
SUBJECTIVE: Pt reports no pain on this date. Pt wife present throughout occupational therapy session.    CAREGIVER INVOLVEMENT/ASSISTANCE NEEDED FOR: Wife assists with IADLs as needed    HOME HEALTH SUPPLIES BY TYPE AND QUANTITY ORDERED/DELIVERED THIS VISIT INCLUDE: N/A    OBJECTIVE: See interventions    PATIENT RESPONSE TO TREATMENT: Pt responded well to occupational therapy session on this date with no increase in pain throughout    PATIENT LEVEL OF UNDERSTANDING OF EDUCATION PROVIDED: Pt educated on upgraded BUE strengthening HEP. Pt educated on sending extension orders and requiring time to allow MD to sign orders.     OCCUPATIONAL THERAPY REASSESSMENT: Mr. Shetty has been seen by skilled home health occupational therapy services to address deficits in balance, functional mobility, and BUE function. Pt has made good progress toward his occupational therapy goals, however pt would benefit from continued skilled home health occupational therapy services to further progress pt independence and safety throughout his daily routine. Pt continues to require skilled home health occupational therapy services to increase static and dynamic standing balance, ADLs, and functional mobility due pt wife still assisting with all of his daily routine    CONTINUED NEED FOR THE FOLLOWING SKILLS: Due to reduced functional activity tolerance, BUE weakness, functional mobility, balance, and ADL function, pt would benefit from OT HH services in order to maximize safety and independence in home environment.    PLAN FOR NEXT VISIT: Continue progressing pt safety and independence within his daily routine    THE FOLLOWING DISCHARGE PLANNING WAS DISCUSSED WITH THE PATIENT/CAREGIVER: Pt will benefit from HHOT extension of 2w1, 1w1. HHOT to further work on BUE strengthening, ADLs, standing balance, and functional mobility to increase his safety and independence within his daily routine with plan to dc to home environment once HH OT goals have

## 2024-02-29 ENCOUNTER — HOME CARE VISIT (OUTPATIENT)
Age: 69
End: 2024-02-29
Payer: MEDICARE

## 2024-02-29 PROCEDURE — G0157 HHC PT ASSISTANT EA 15: HCPCS

## 2024-03-01 ENCOUNTER — HOME CARE VISIT (OUTPATIENT)
Age: 69
End: 2024-03-01
Payer: MEDICARE

## 2024-03-01 VITALS
OXYGEN SATURATION: 98 % | TEMPERATURE: 97.7 F | SYSTOLIC BLOOD PRESSURE: 155 MMHG | DIASTOLIC BLOOD PRESSURE: 85 MMHG | HEART RATE: 68 BPM

## 2024-03-01 VITALS
HEART RATE: 55 BPM | SYSTOLIC BLOOD PRESSURE: 165 MMHG | TEMPERATURE: 98.6 F | OXYGEN SATURATION: 98 % | RESPIRATION RATE: 17 BRPM | DIASTOLIC BLOOD PRESSURE: 78 MMHG

## 2024-03-01 PROCEDURE — G0152 HHCP-SERV OF OT,EA 15 MIN: HCPCS

## 2024-03-01 ASSESSMENT — ENCOUNTER SYMPTOMS: PAIN LOCATION - PAIN QUALITY: ACHING SORE

## 2024-03-01 NOTE — HOME HEALTH
SUBJECTIVE: Pt reports no pain on this date. Pt wife present throughout occupational therapy session    CAREGIVER INVOLVEMENT/ASSISTANCE NEEDED FOR: Pt wife assists with IADLs as needed    HOME HEALTH SUPPLIES BY TYPE AND QUANTITY ORDERED/DELIVERED THIS VISIT INCLUDE: N/A    OBJECTIVE: See interventions    PATIENT RESPONSE TO TREATMENT: Pt responded well to occupational therapy session on this date with no increase in pain or discomfort    PATIENT LEVEL OF UNDERSTANDING OF EDUCATION PROVIDED: Pt educated on requesting extension and requiring time for MD to sign and return orders. Pt educated on continuing to complete BUE strengthening and AAROM exercises to increase usage of RUE and decrease risk of contractures    ASSESSMENT OF PROGRESS TOWARD GOALS: Pt progressing well toward occupational therapy goals. Pt has been provided with upgraded HEP to continue strengtheing RUE and increase AAROM.     CONTINUED NEED FOR THE FOLLOWING SKILLS: Due to reduced functional activity tolerance, BUE weakness, functional mobility, balance, and ADL function, pt would benefit from OT HH services in order to maximize safety and independence in home environment.    PLAN FOR NEXT VISIT: BUE function    THE FOLLOWING DISCHARGE PLANNING WAS DISCUSSED WITH THE PATIENT/CAREGIVER: 2w1, 1w1 pending MD extension order with plan to dc to home environment once HH OT goals have been met or has met max potential.

## 2024-03-01 NOTE — HOME HEALTH
SUBJECTIVE: Pt c/o pain in LLE, no falls no changes in medications   CAREGIVER INVOLVEMENT/ASSISTANCE NEEDED FOR: Spouse assist pt as needed   OBJECTIVE: See interventions  PATIENT EDUCATION PROVIDED THIS VISIT: Reviewed fall prevention, educated safety and energy conservation  PATIENT RESPONSE TO EDUCATION: Pt verbalizes understanding of education   PATIENT RESPONSE TO TREATMENT/ASSESSMENT OF PROGRESS TOWARD GOALS: Pt able to recall and perform HEP with minimal VCs to improve tech, stair negotiation requires instruction and assist to improve safety and sequencing, pt prefers to ascend and descend on opposite LEs,  Gait tng outside on uneven surfaces with small based quad cane instruction to slow nell and stride length to improve safety pt lost balance twice. Pt tends to rush through activities and becomes SOB instructed to slow down and to pace himself. Skilled PT intervention needed to address deficits in strength, gait, transfers endurance to improve functional mobility and safety and lower risk for falls or injury.   PLAN FOR NEXT VISIT: Next visit will address endurance, gait and strength  DISCHARGE PLANS: POC and Discharge plans discussed pt understands and agrees eventual DC once goals have been met or max HC benefits have been achieved. 3 visits remaining anticipated DC 3/12/2024

## 2024-03-02 PROBLEM — R79.89 ELEVATED TROPONIN: Status: RESOLVED | Noted: 2024-02-01 | Resolved: 2024-03-02

## 2024-03-05 ENCOUNTER — HOME CARE VISIT (OUTPATIENT)
Age: 69
End: 2024-03-05
Payer: MEDICARE

## 2024-03-05 VITALS
DIASTOLIC BLOOD PRESSURE: 73 MMHG | OXYGEN SATURATION: 98 % | SYSTOLIC BLOOD PRESSURE: 160 MMHG | HEART RATE: 64 BPM | TEMPERATURE: 98.7 F

## 2024-03-05 PROCEDURE — G0157 HHC PT ASSISTANT EA 15: HCPCS

## 2024-03-05 ASSESSMENT — ENCOUNTER SYMPTOMS: PAIN LOCATION - PAIN QUALITY: ACHING

## 2024-03-05 NOTE — HOME HEALTH
SUBJECTIVE: Pt c/o pain in LLE, no falls no changes in medications, states he is able to do a little more before becoming SOB or fatigued, Pt would like to continue PT and OT in outpatient setting following DC from   CAREGIVER INVOLVEMENT/ASSISTANCE NEEDED FOR: Spouse assist pt as needed   OBJECTIVE: See interventions  PATIENT EDUCATION PROVIDED THIS VISIT: Reviewed fall prevention energy conservation instructed to rest when he becomes fatigued or short of breath  PATIENT RESPONSE TO EDUCATION: Pt able to teach back education   PATIENT RESPONSE TO TREATMENT/ASSESSMENT OF PROGRESS TOWARD GOALS: Pt able to recall and perform HEP VCs to slow nell, stair negotiation x 2 to exit/enter home with use of rails pt continues to ascend and descend on opposite LEs states this is easier for him. Gait tng outside on uneven surfaces with small based quad cane pt moves quickly instruction to slow nell, 2 standing rest periods needed, addresssed balance pt able to tolerate challenges to balance safely.  Pt is progressing well toward established goals and is on target for anticipated DC.  PLAN FOR NEXT VISIT: Next visit will address TINETTI, STRENGTH AND GAIT   DISCHARGE PLANS: POC and Discharge plans discussed pt understands and agrees eventual DC once goals have been met or max HC benefits have been achieved. 2 visits remaining anticipated DC 3/12/2024

## 2024-03-07 ENCOUNTER — HOME CARE VISIT (OUTPATIENT)
Age: 69
End: 2024-03-07
Payer: MEDICARE

## 2024-03-07 VITALS
OXYGEN SATURATION: 96 % | DIASTOLIC BLOOD PRESSURE: 86 MMHG | SYSTOLIC BLOOD PRESSURE: 156 MMHG | HEART RATE: 61 BPM | TEMPERATURE: 98.7 F

## 2024-03-07 PROCEDURE — G0157 HHC PT ASSISTANT EA 15: HCPCS

## 2024-03-08 NOTE — HOME HEALTH
SUBJECTIVE: No complaints of pain, no changes in medications, pt had a fall on 3/6/2024 fall was reported to PCPs office pt has skin tear on right arm above elbow  CAREGIVER INVOLVEMENT/ASSISTANCE NEEDED FOR: Spouse assist pt as needed   Assessment and Summary of Care:  Patient's current functional status before discharge is as follows  Strength: 5/5 Bilateral LEs  ROM:  WFL  Bed Mobility: Ind bed mobility  Transfers: Mod I - CGA with Quad cane  Gait/WC mobility: 125ft x 2 with Quad Cane on even/uneven surfaces w/ dist supervision   Stairs: Mod I with rails  Special Tests: TUG 25    TINETTI 18/28  Recommendations: Continue in outpatient setting

## 2024-03-11 NOTE — ROUTINE PROCESS
Met with patient and family during office appt with Dr. Gould.  Patient here for follow up post hospitalization.    Patient reports slowly recovering from hospitalization.  Is residing at [facility] for short term rehab.  Feels he is getting stronger.  His goal is to get back home where he lives with his daughter.    Dr. Gould reviewed recent CT which does not show any change in cancer.  Pt scheduled to see Dr. Aguilar on Thurs to discuss radiation.  If Dr. Aguilar feels SBRT is appropriate for pt, pt to follow up with Dr. Gould in 3 months with CT scan.  If Dr. Aguilar feels pt requires long course of radiation, Dr. Gould discussed adding weekly carbo.  Will await final treatment plan until after consult with Dr. Aguilar.    All questions answered.    PLAN: 3/14 consult with Dr. Aguilar  F/U Dr. Gould in 3 months with CT scan (If pt has SBRT)    Patient and family deny any additional needs or concerns.   Encouraged to call should any arise prior to next scheduled appt.  Update provided to JEROME Blue who will continue to follow.       SHIFT CHANGE NOTE FOR [facility]    Bedside and Verbal shift change report given to Mercedes Bhat (oncoming nurse) by Renny Camp RN   (offgoing nurse). Report included the following information SBAR, Kardex, MAR and Recent Results. Situation:   Code Status: Full Code   Reason for Admission: CVA  Hospital Day: 14   Problem List:   Hospital Problems  Date Reviewed: 8/27/2019          Codes Class Noted POA    Primary osteoarthritis of right ankle (Chronic) ICD-10-CM: M19.071  ICD-9-CM: 715.17  8/22/2019 Yes    Overview Signed 8/23/2019 11:54 AM by Fabrice Shaffer MD     X-ray of the right ankle (8/22/2019) showed no acute fracture or subluxation; advanced degenerative changes at the tibiotalar joint; old fracture fragment vs. accessory ossicle in the inferior aspect of the lateral malleolus; soft tissue swelling around the ankle.              Vitamin D deficiency (Chronic) ICD-10-CM: E55.9  ICD-9-CM: 268.9  8/19/2019 Yes    Overview Signed 8/19/2019  1:34 PM by Fabrice Shaffer MD     Vitamin D 25-Hydroxy (8/19/2019) = 16.2              Secondary hyperparathyroidism of renal origin ([facility] [address] (Chronic) ICD-10-CM: N25.81  ICD-9-CM: 588.81  8/18/2019 Yes    Overview Signed 8/28/2019  1:24 PM by Fabrice Shaffer MD     PTH (8/18/2019) = 101.7             Leukocytosis ICD-10-CM: Z18.728  ICD-9-CM: 288.60  8/16/2019 Yes    Overview Signed 8/16/2019 11:19 AM by Fabrice Shaffer MD     WBC count (8/16/2019) = 17.9             Hypertensive heart and kidney disease without heart failure and with stage 2 chronic kidney disease (Chronic) ICD-10-CM: I13.10, N18.2  ICD-9-CM: 404.90, 585.2  Unknown Yes        Chronic hypokalemia ICD-10-CM: E87.6  ICD-9-CM: 276.8  Unknown Yes    Overview Addendum 8/15/2019 11:31 AM by Fabrice Shaffer MD     Persistent chronic hypokalemia + hypertension; ?primary hyperaldosteronism             Type 2 diabetes mellitus with stage 2 chronic kidney disease (HCC) (Chronic) ICD-10-CM: E11.22, N18.2  ICD-9-CM: 250.40, 585.2  Unknown Yes    Overview Signed 8/15/2019 12:26 PM by Norma Gleason MD     HbA1c (8/13/2019) = 6.6             Pure hypercholesterolemia (Chronic) ICD-10-CM: E78.00  ICD-9-CM: 272.0  8/15/2019 Yes    Overview Addendum 8/15/2019  6:27 PM by Norma Gleason MD     Lipid profile (8/13/2019) showed , , HDL 42, ; Lipid profile (8/14/2019) showed , , HDL 39, LDL 94             Current use of aspirin ICD-10-CM: Z79.82  ICD-9-CM: V58.66  8/14/2019 Yes        On statin therapy due to risk of future cardiovascular event ICD-10-CM: Z79.899  ICD-9-CM: V58.69  8/14/2019 Yes    Overview Signed 8/15/2019 12:21 PM by Norma Gleason MD     On Atorvastatin             Vitamin B12 deficiency anemia (Chronic) ICD-10-CM: D51.9  ICD-9-CM: 281.1  8/14/2019 Yes    Overview Signed 8/19/2019  1:24 PM by Norma Gleason MD     Vitamin B12 (8/14/2019) = 208             Iron deficiency anemia (Chronic) ICD-10-CM: D50.9  ICD-9-CM: 280.9  8/14/2019 Yes    Overview Signed 8/19/2019  1:25 PM by Norma Gleason MD     Anemia work-up (8/14/2019) showed serum iron 22, TIBC 371, iron % saturation 6, ferritin 34, reticulocyte count 1.4               Refusal of statin medication by patient ICD-10-CM: Z53.29  ICD-9-CM: V64.2  8/13/2019 Yes    Overview Signed 8/15/2019  2:01 PM by Norma Gleason MD     As per note of Neurology (Dr. Endy Forbes), patient refuses statin agent because of potential side effects.               * (Principal) Acute ischemic stroke [facility]) ICD-10-CM: I63.9  ICD-9-CM: 434.91  8/12/2019 Yes    Overview Signed 8/15/2019 12:17 AM by Norma Gleason MD     Acute Ischemic Stroke (acute/early subacute infarct at the left posterior basal ganglia to corona radiata) with residual right hemiparesis, dysphagia and dysarthria             Impaired mobility and ADLs ICD-10-CM: Z74.09  ICD-9-CM: 799.89  8/12/2019 Yes        Received intravenous tissue plasminogen activator (tPA) in emergency department ICD-10-CM: Z92.82  ICD-9-CM: V45.88  8/12/2019 Yes        Right hemiparesis ([facility] [address] ICD-10-CM: G81.91  ICD-9-CM: 342.90  8/12/2019 Yes        Dysphagia ICD-10-CM: R13.10  ICD-9-CM: 787.20  8/12/2019 Yes        Dysarthria ICD-10-CM: R47.1  ICD-9-CM: 784.51  8/12/2019 Yes              Background:   Past Medical History:   Past Medical History:   Diagnosis Date    Acute ischemic stroke ([facility] [address] 8/12/2019    Acute Ischemic Stroke (acute/early subacute infarct at the left posterior basal ganglia to corona radiata) with residual right hemiparesis, dysphagia and dysarthria    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     On Allopurinol    Chronic hypokalemia     Persistent chronic hypokalemia + hypertension; ?primary hyperaldosteronism    CKD (chronic kidney disease) stage 2, GFR 60-89 ml/min 8/15/2019    Current use of aspirin 8/14/2019    Dysarthria 8/12/2019    Dysphagia 8/12/2019    Elevated prostate specific antigen (PSA) 7/21/2011    First degree atrioventricular block by electrocardiogram 8/12/2019    Hypertensive heart and kidney disease without heart failure and with stage 2 chronic kidney disease     Iron deficiency anemia 8/14/2019    Anemia work-up (8/14/2019) showed serum iron 22, TIBC 371, iron % saturation 6, ferritin 34, reticulocyte count 1.4     Obesity, Class I, BMI 30-34.9     Obstructive sleep apnea on CPAP     On statin therapy due to risk of future cardiovascular event 8/14/2019    On Atorvastatin    Primary osteoarthritis of right ankle 8/22/2019    X-ray of the right ankle (8/22/2019) showed no acute fracture or subluxation; advanced degenerative changes at the tibiotalar joint; old fracture fragment vs. accessory ossicle in the inferior aspect of the lateral malleolus; soft tissue swelling around the ankle.     Pure hypercholesterolemia 08/15/2019    Lipid profile (8/13/2019) showed , , HDL 42, ; Lipid profile (8/14/2019) showed , TC 173, HDL 39, LDL 94    Received intravenous tissue plasminogen activator (tPA) in emergency department 8/12/2019    Refusal of statin medication by patient 8/13/2019    As per note of Neurology (Dr. Lonnie Reed), patient refuses statin agent because of potential side effects.  Right hemiparesis ([facility] [address] 8/12/2019    Secondary hyperparathyroidism of renal origin ([facility] [address] 8/18/2019    PTH (8/18/2019) = 101.7    Type 2 diabetes mellitus with stage 2 chronic kidney disease (HCC)     HbA1c (8/13/2019) = 6.6    Vitamin B12 deficiency anemia 8/14/2019    Vitamin B12 (8/14/2019) = 208    Vitamin D deficiency 8/19/2019    Vitamin D 25-Hydroxy (8/19/2019) = 16.2       Patient taking anticoagulants yes Heparin, ASA   Patient has a defibrillator: no     Assessment:   Changes in Assessment throughout shift: No     Patient has central line: no Reasons if yes: Insertion date: Last dressing date:   Patient has Boykin Cath: no Reasons if yes:     Insertion date:     Last Vitals:     Vitals:    08/28/19 0813 08/28/19 1521 08/28/19 2202 08/29/19 0621   BP: 117/64 178/65 185/72 183/79   Pulse: 64 71 68 63   Resp: 18 18 18    Temp: 98 °F (36.7 °C) 98.9 °F (37.2 °C) 97.1 °F (36.2 °C)    SpO2: 99% 99% 96%    Weight:       Height:            PAIN    Pain Assessment    Pain Intensity 1: 0 (08/29/19 0349) Pain Intensity 1: 2 (12/29/14 1105)    Pain Location 1: Shoulder Pain Location 1: Abdomen    Pain Intervention(s) 1: Medication (see MAR) Pain Intervention(s) 1: Medication (see MAR)  Patient Stated Pain Goal: 0 Patient Stated Pain Goal: 0  o Intervention effective: no    o Other actions taken for pain:      Skin Assessment  Skin color Skin Color: Appropriate for ethnicity  Condition/Temperature Skin Condition/Temp: Warm  Integrity Skin Integrity: Intact  Turgor Turgor: Epidermis thin w/ loss of subcut tissue  Weekly Pressure Ulcer Documentation  Pressure  Injury Documentation: No Pressure Injury Noted-Pressure Ulcer Prevention Initiated  Wound Prevention & Protection Methods  Orientation of wound Orientation of Wound Prevention: Posterior  Location of Prevention Location of Wound Prevention: Sacrum/Coccyx  Dressing Present Dressing Present : Intact, not due to be changed  Dressing Status Dressing Status: Intact  Wound Offloading Wound Offloading (Prevention Methods): Bed, pressure redistribution/air, Chair cushion     INTAKE/OUPUT  Date 08/28/19 0700 - 08/29/19 0659 08/29/19 0700 - 08/30/19 0659   Shift 0700-1859 1900-0659 24 Hour Total 0700-1859 1900-0659 24 Hour Total   INTAKE   Shift Total(mL/kg)         OUTPUT   Urine(mL/kg/hr)           Urine Occurrence(s) 4 x 4 x 8 x      Emesis/NG output           Emesis Occurrence(s)  0 x 0 x      Stool           Stool Occurrence(s) 1 x 0 x 1 x      Shift Total(mL/kg)         NET         Weight (kg) 102 102 102 102 102 102       Recommendations:  1. Patient needs and requests:     2. Diet: Cardiac Mech Soft Nectar Thick     3. Pending tests/procedures:      4. Functional Level/Equipment:     5. Estimated Discharge Date: 8/24/19 Posted on Whiteboard in Patients Room: yes     HEALS Safety Check    A safety check occurred in the patient's room between off going nurse and oncoming nurse listed above.     The safety check included the below items  Area Items   H  High Alert Medications - Verify all high alert medication drips (heparin, PCA, etc.)   E  Equipment - Suction is set up for ALL patients (with yanker)  - Red plugs utilized for all equipment (IV pumps, etc.)  - WOWs wiped down at end of shift.  - Room stocked with oxygen, suction, and other unit-specific supplies   A  Alarms - Bed alarm is set for fall risk patients  - Ensure chair alarm is in place and activated if patient is up in a chair   L  Lines - Check IV for any infiltration  - Boykin bag is empty if patient has a Boykin   - Tubing and IV bags are labeled   S  Safety   - Room is clean, patient is clean, and equipment is clean.  - Hallways are clear from equipment besides carts. - Fall bracelet on for fall risk patients  - Ensure room is clear and free of clutter  - Suction is set up for ALL patients (with diann)  - Hallways are clear from equipment besides carts.    - Isolation precautions followed, supplies available outside room, sign posted

## 2024-03-12 ENCOUNTER — HOME CARE VISIT (OUTPATIENT)
Age: 69
End: 2024-03-12
Payer: MEDICARE

## 2024-03-12 PROCEDURE — G0151 HHCP-SERV OF PT,EA 15 MIN: HCPCS

## 2024-03-17 ENCOUNTER — APPOINTMENT (OUTPATIENT)
Facility: HOSPITAL | Age: 69
End: 2024-03-17
Payer: MEDICARE

## 2024-03-17 ENCOUNTER — HOME CARE VISIT (OUTPATIENT)
Age: 69
End: 2024-03-17
Payer: MEDICARE

## 2024-03-17 ENCOUNTER — APPOINTMENT (OUTPATIENT)
Facility: HOSPITAL | Age: 69
End: 2024-03-17
Attending: EMERGENCY MEDICINE
Payer: MEDICARE

## 2024-03-17 ENCOUNTER — HOSPITAL ENCOUNTER (INPATIENT)
Facility: HOSPITAL | Age: 69
LOS: 4 days | Discharge: HOME HEALTH | End: 2024-03-21
Attending: EMERGENCY MEDICINE | Admitting: HOSPITALIST
Payer: MEDICARE

## 2024-03-17 DIAGNOSIS — R60.9 EDEMA: Primary | ICD-10-CM

## 2024-03-17 DIAGNOSIS — N17.9 ACUTE RENAL FAILURE SUPERIMPOSED ON STAGE 3B CHRONIC KIDNEY DISEASE, UNSPECIFIED ACUTE RENAL FAILURE TYPE (HCC): ICD-10-CM

## 2024-03-17 DIAGNOSIS — E87.70 HYPERVOLEMIA, UNSPECIFIED HYPERVOLEMIA TYPE: ICD-10-CM

## 2024-03-17 DIAGNOSIS — D64.9 ACUTE ON CHRONIC ANEMIA: ICD-10-CM

## 2024-03-17 DIAGNOSIS — D50.9 IRON DEFICIENCY ANEMIA, UNSPECIFIED IRON DEFICIENCY ANEMIA TYPE: ICD-10-CM

## 2024-03-17 DIAGNOSIS — N18.32 ACUTE RENAL FAILURE SUPERIMPOSED ON STAGE 3B CHRONIC KIDNEY DISEASE, UNSPECIFIED ACUTE RENAL FAILURE TYPE (HCC): ICD-10-CM

## 2024-03-17 LAB
ALBUMIN SERPL-MCNC: 2.8 G/DL (ref 3.4–5)
ALBUMIN/GLOB SERPL: 0.7 (ref 0.8–1.7)
ALP SERPL-CCNC: 93 U/L (ref 45–117)
ALT SERPL-CCNC: 23 U/L (ref 16–61)
ANION GAP SERPL CALC-SCNC: 7 MMOL/L (ref 3–18)
AST SERPL-CCNC: 27 U/L (ref 10–38)
BASOPHILS # BLD: 0.1 K/UL (ref 0–0.1)
BASOPHILS NFR BLD: 1 % (ref 0–2)
BILIRUB SERPL-MCNC: 0.5 MG/DL (ref 0.2–1)
BUN SERPL-MCNC: 54 MG/DL (ref 7–18)
BUN/CREAT SERPL: 16 (ref 12–20)
CALCIUM SERPL-MCNC: 8.6 MG/DL (ref 8.5–10.1)
CHLORIDE SERPL-SCNC: 112 MMOL/L (ref 100–111)
CO2 SERPL-SCNC: 27 MMOL/L (ref 21–32)
CREAT SERPL-MCNC: 3.29 MG/DL (ref 0.6–1.3)
DIFFERENTIAL METHOD BLD: ABNORMAL
ECHO BSA: 2.2 M2
EKG DIAGNOSIS: NORMAL
EKG Q-T INTERVAL: 464 MS
EKG QRS DURATION: 102 MS
EKG QTC CALCULATION (BAZETT): 455 MS
EKG R AXIS: -18 DEGREES
EKG T AXIS: -68 DEGREES
EKG VENTRICULAR RATE: 58 BPM
EOSINOPHIL # BLD: 0.2 K/UL (ref 0–0.4)
EOSINOPHIL NFR BLD: 2 % (ref 0–5)
ERYTHROCYTE [DISTWIDTH] IN BLOOD BY AUTOMATED COUNT: 15.8 % (ref 11.6–14.5)
EST. AVERAGE GLUCOSE BLD GHB EST-MCNC: ABNORMAL MG/DL
FERRITIN SERPL-MCNC: 60 NG/ML (ref 8–388)
GLOBULIN SER CALC-MCNC: 4 G/DL (ref 2–4)
GLUCOSE BLD STRIP.AUTO-MCNC: 91 MG/DL (ref 70–110)
GLUCOSE SERPL-MCNC: 105 MG/DL (ref 74–99)
HBA1C MFR BLD: <3.8 % (ref 4.2–5.6)
HCT VFR BLD AUTO: 19.9 % (ref 36–48)
HEMOCCULT STL QL: POSITIVE
HGB BLD-MCNC: 5.8 G/DL (ref 13–16)
HISTORY CHECK: NORMAL
IMM GRANULOCYTES # BLD AUTO: 0.1 K/UL (ref 0–0.04)
IMM GRANULOCYTES NFR BLD AUTO: 1 % (ref 0–0.5)
IRON SATN MFR SERPL: 7 % (ref 20–50)
IRON SERPL-MCNC: 22 UG/DL (ref 50–175)
LYMPHOCYTES # BLD: 1 K/UL (ref 0.9–3.6)
LYMPHOCYTES NFR BLD: 8 % (ref 21–52)
MAGNESIUM SERPL-MCNC: 2.4 MG/DL (ref 1.6–2.6)
MCH RBC QN AUTO: 24.5 PG (ref 24–34)
MCHC RBC AUTO-ENTMCNC: 29.1 G/DL (ref 31–37)
MCV RBC AUTO: 84 FL (ref 78–100)
MONOCYTES # BLD: 0.9 K/UL (ref 0.05–1.2)
MONOCYTES NFR BLD: 8 % (ref 3–10)
NEUTS SEG # BLD: 9.2 K/UL (ref 1.8–8)
NEUTS SEG NFR BLD: 81 % (ref 40–73)
NRBC # BLD: 0 K/UL (ref 0–0.01)
NRBC BLD-RTO: 0 PER 100 WBC
NT PRO BNP: ABNORMAL PG/ML (ref 0–900)
PLATELET # BLD AUTO: 332 K/UL (ref 135–420)
PMV BLD AUTO: 9.9 FL (ref 9.2–11.8)
POTASSIUM SERPL-SCNC: 3.1 MMOL/L (ref 3.5–5.5)
PROT SERPL-MCNC: 6.8 G/DL (ref 6.4–8.2)
RBC # BLD AUTO: 2.37 M/UL (ref 4.35–5.65)
SODIUM SERPL-SCNC: 146 MMOL/L (ref 136–145)
TIBC SERPL-MCNC: 338 UG/DL (ref 250–450)
TROPONIN I SERPL HS-MCNC: 41 NG/L (ref 0–78)
TROPONIN I SERPL HS-MCNC: 42 NG/L (ref 0–78)
WBC # BLD AUTO: 11.4 K/UL (ref 4.6–13.2)

## 2024-03-17 PROCEDURE — 84484 ASSAY OF TROPONIN QUANT: CPT

## 2024-03-17 PROCEDURE — 82962 GLUCOSE BLOOD TEST: CPT

## 2024-03-17 PROCEDURE — 1100000003 HC PRIVATE W/ TELEMETRY

## 2024-03-17 PROCEDURE — 83880 ASSAY OF NATRIURETIC PEPTIDE: CPT

## 2024-03-17 PROCEDURE — 30233N1 TRANSFUSION OF NONAUTOLOGOUS RED BLOOD CELLS INTO PERIPHERAL VEIN, PERCUTANEOUS APPROACH: ICD-10-PCS

## 2024-03-17 PROCEDURE — 86900 BLOOD TYPING SEROLOGIC ABO: CPT

## 2024-03-17 PROCEDURE — 93971 EXTREMITY STUDY: CPT

## 2024-03-17 PROCEDURE — 6360000002 HC RX W HCPCS

## 2024-03-17 PROCEDURE — 6360000002 HC RX W HCPCS: Performed by: EMERGENCY MEDICINE

## 2024-03-17 PROCEDURE — 93971 EXTREMITY STUDY: CPT | Performed by: INTERNAL MEDICINE

## 2024-03-17 PROCEDURE — 85025 COMPLETE CBC W/AUTO DIFF WBC: CPT

## 2024-03-17 PROCEDURE — 83540 ASSAY OF IRON: CPT

## 2024-03-17 PROCEDURE — 93010 ELECTROCARDIOGRAM REPORT: CPT | Performed by: INTERNAL MEDICINE

## 2024-03-17 PROCEDURE — 71045 X-RAY EXAM CHEST 1 VIEW: CPT

## 2024-03-17 PROCEDURE — 93005 ELECTROCARDIOGRAM TRACING: CPT | Performed by: EMERGENCY MEDICINE

## 2024-03-17 PROCEDURE — 5A09357 ASSISTANCE WITH RESPIRATORY VENTILATION, LESS THAN 24 CONSECUTIVE HOURS, CONTINUOUS POSITIVE AIRWAY PRESSURE: ICD-10-PCS

## 2024-03-17 PROCEDURE — 94640 AIRWAY INHALATION TREATMENT: CPT

## 2024-03-17 PROCEDURE — 96375 TX/PRO/DX INJ NEW DRUG ADDON: CPT

## 2024-03-17 PROCEDURE — P9016 RBC LEUKOCYTES REDUCED: HCPCS

## 2024-03-17 PROCEDURE — 83735 ASSAY OF MAGNESIUM: CPT

## 2024-03-17 PROCEDURE — 99223 1ST HOSP IP/OBS HIGH 75: CPT

## 2024-03-17 PROCEDURE — 82272 OCCULT BLD FECES 1-3 TESTS: CPT

## 2024-03-17 PROCEDURE — 99285 EMERGENCY DEPT VISIT HI MDM: CPT

## 2024-03-17 PROCEDURE — 83550 IRON BINDING TEST: CPT

## 2024-03-17 PROCEDURE — 86850 RBC ANTIBODY SCREEN: CPT

## 2024-03-17 PROCEDURE — 96376 TX/PRO/DX INJ SAME DRUG ADON: CPT

## 2024-03-17 PROCEDURE — 96374 THER/PROPH/DIAG INJ IV PUSH: CPT

## 2024-03-17 PROCEDURE — 86901 BLOOD TYPING SEROLOGIC RH(D): CPT

## 2024-03-17 PROCEDURE — 83036 HEMOGLOBIN GLYCOSYLATED A1C: CPT

## 2024-03-17 PROCEDURE — 86923 COMPATIBILITY TEST ELECTRIC: CPT

## 2024-03-17 PROCEDURE — 80053 COMPREHEN METABOLIC PANEL: CPT

## 2024-03-17 PROCEDURE — 6370000000 HC RX 637 (ALT 250 FOR IP)

## 2024-03-17 PROCEDURE — 82728 ASSAY OF FERRITIN: CPT

## 2024-03-17 RX ORDER — ONDANSETRON 4 MG/1
4 TABLET, ORALLY DISINTEGRATING ORAL EVERY 8 HOURS PRN
Status: DISCONTINUED | OUTPATIENT
Start: 2024-03-17 | End: 2024-03-21 | Stop reason: HOSPADM

## 2024-03-17 RX ORDER — DEXTROSE MONOHYDRATE 100 MG/ML
INJECTION, SOLUTION INTRAVENOUS CONTINUOUS PRN
Status: DISCONTINUED | OUTPATIENT
Start: 2024-03-17 | End: 2024-03-21 | Stop reason: HOSPADM

## 2024-03-17 RX ORDER — HYDRALAZINE HYDROCHLORIDE 50 MG/1
100 TABLET, FILM COATED ORAL 2 TIMES DAILY
Status: DISCONTINUED | OUTPATIENT
Start: 2024-03-17 | End: 2024-03-21 | Stop reason: HOSPADM

## 2024-03-17 RX ORDER — ATORVASTATIN CALCIUM 40 MG/1
40 TABLET, FILM COATED ORAL DAILY
Status: DISCONTINUED | OUTPATIENT
Start: 2024-03-17 | End: 2024-03-21 | Stop reason: HOSPADM

## 2024-03-17 RX ORDER — IPRATROPIUM BROMIDE AND ALBUTEROL SULFATE 2.5; .5 MG/3ML; MG/3ML
1 SOLUTION RESPIRATORY (INHALATION)
Status: COMPLETED | OUTPATIENT
Start: 2024-03-17 | End: 2024-03-19

## 2024-03-17 RX ORDER — ACETAMINOPHEN 650 MG/1
650 SUPPOSITORY RECTAL EVERY 6 HOURS PRN
Status: DISCONTINUED | OUTPATIENT
Start: 2024-03-17 | End: 2024-03-21 | Stop reason: HOSPADM

## 2024-03-17 RX ORDER — FUROSEMIDE 10 MG/ML
40 INJECTION INTRAMUSCULAR; INTRAVENOUS ONCE
Status: COMPLETED | OUTPATIENT
Start: 2024-03-17 | End: 2024-03-17

## 2024-03-17 RX ORDER — FINASTERIDE 5 MG/1
5 TABLET, FILM COATED ORAL DAILY
Status: DISCONTINUED | OUTPATIENT
Start: 2024-03-18 | End: 2024-03-21 | Stop reason: HOSPADM

## 2024-03-17 RX ORDER — INSULIN LISPRO 100 [IU]/ML
0-8 INJECTION, SOLUTION INTRAVENOUS; SUBCUTANEOUS
Status: DISCONTINUED | OUTPATIENT
Start: 2024-03-17 | End: 2024-03-21 | Stop reason: HOSPADM

## 2024-03-17 RX ORDER — ACETAMINOPHEN 325 MG/1
650 TABLET ORAL EVERY 6 HOURS PRN
Status: DISCONTINUED | OUTPATIENT
Start: 2024-03-17 | End: 2024-03-21 | Stop reason: HOSPADM

## 2024-03-17 RX ORDER — SODIUM CHLORIDE 0.9 % (FLUSH) 0.9 %
5-40 SYRINGE (ML) INJECTION PRN
Status: DISCONTINUED | OUTPATIENT
Start: 2024-03-17 | End: 2024-03-21 | Stop reason: HOSPADM

## 2024-03-17 RX ORDER — FUROSEMIDE 10 MG/ML
80 INJECTION INTRAMUSCULAR; INTRAVENOUS ONCE
Status: COMPLETED | OUTPATIENT
Start: 2024-03-17 | End: 2024-03-17

## 2024-03-17 RX ORDER — FUROSEMIDE 40 MG/1
40 TABLET ORAL
Status: DISCONTINUED | OUTPATIENT
Start: 2024-03-17 | End: 2024-03-17

## 2024-03-17 RX ORDER — ARFORMOTEROL TARTRATE 15 UG/2ML
15 SOLUTION RESPIRATORY (INHALATION)
Status: COMPLETED | OUTPATIENT
Start: 2024-03-17 | End: 2024-03-19

## 2024-03-17 RX ORDER — SODIUM CHLORIDE 0.9 % (FLUSH) 0.9 %
5-40 SYRINGE (ML) INJECTION EVERY 12 HOURS SCHEDULED
Status: DISCONTINUED | OUTPATIENT
Start: 2024-03-17 | End: 2024-03-21 | Stop reason: HOSPADM

## 2024-03-17 RX ORDER — ONDANSETRON 2 MG/ML
4 INJECTION INTRAMUSCULAR; INTRAVENOUS EVERY 6 HOURS PRN
Status: DISCONTINUED | OUTPATIENT
Start: 2024-03-17 | End: 2024-03-21 | Stop reason: HOSPADM

## 2024-03-17 RX ORDER — BUDESONIDE 1 MG/2ML
1 INHALANT ORAL
Status: COMPLETED | OUTPATIENT
Start: 2024-03-17 | End: 2024-03-19

## 2024-03-17 RX ORDER — POLYETHYLENE GLYCOL 3350 17 G/17G
17 POWDER, FOR SOLUTION ORAL DAILY PRN
Status: DISCONTINUED | OUTPATIENT
Start: 2024-03-17 | End: 2024-03-21 | Stop reason: HOSPADM

## 2024-03-17 RX ORDER — FUROSEMIDE 10 MG/ML
40 INJECTION INTRAMUSCULAR; INTRAVENOUS ONCE
Status: DISCONTINUED | OUTPATIENT
Start: 2024-03-17 | End: 2024-03-17

## 2024-03-17 RX ORDER — SODIUM CHLORIDE 9 MG/ML
INJECTION, SOLUTION INTRAVENOUS PRN
Status: DISCONTINUED | OUTPATIENT
Start: 2024-03-17 | End: 2024-03-21 | Stop reason: HOSPADM

## 2024-03-17 RX ORDER — AMLODIPINE BESYLATE 10 MG/1
10 TABLET ORAL DAILY
Status: DISCONTINUED | OUTPATIENT
Start: 2024-03-17 | End: 2024-03-21 | Stop reason: HOSPADM

## 2024-03-17 RX ADMIN — BUDESONIDE 1 MG: 1 SUSPENSION RESPIRATORY (INHALATION) at 20:20

## 2024-03-17 RX ADMIN — IRON SUCROSE 100 MG: 20 INJECTION, SOLUTION INTRAVENOUS at 12:33

## 2024-03-17 RX ADMIN — FUROSEMIDE 80 MG: 10 INJECTION, SOLUTION INTRAMUSCULAR; INTRAVENOUS at 16:45

## 2024-03-17 RX ADMIN — FUROSEMIDE 40 MG: 10 INJECTION, SOLUTION INTRAMUSCULAR; INTRAVENOUS at 11:52

## 2024-03-17 RX ADMIN — IPRATROPIUM BROMIDE AND ALBUTEROL SULFATE 1 DOSE: .5; 3 SOLUTION RESPIRATORY (INHALATION) at 20:20

## 2024-03-17 RX ADMIN — ARFORMOTEROL TARTRATE 15 MCG: 15 SOLUTION RESPIRATORY (INHALATION) at 20:20

## 2024-03-17 RX ADMIN — Medication 30 MILLICURIE: at 14:40

## 2024-03-17 ASSESSMENT — PAIN - FUNCTIONAL ASSESSMENT: PAIN_FUNCTIONAL_ASSESSMENT: NONE - DENIES PAIN

## 2024-03-17 NOTE — ED NOTES
RN spoke with nuclear med & DO Amado regarding pt scan for GI Blood loss,     Pt is to have NM Scan completed PRIOR to blood transfusion. VSS.

## 2024-03-17 NOTE — CONSULTS
[email or web address]  [phone]    GASTROENTEROLOGY CONSULT          Impression:   1. Acute on chronic ADELINE   - Hgb 5.8 upon admission w/ baseline in 7-9 range. FOB positive. 3/17 NM scan neg. No overt GI bleeding, abd pain, or n/v.   - Followed by [facility] as OP w/ dx of ADELINE and anemia of chronic disease; received IV iron last 6/2023 but should be on PO iron now, also on q8wk darbepoeitin per 12/2023 [facility] note.   - GI w/u of chronic ADELINE includes EGD-[place] in 2019 w/o significant findings. Pill cam study in 2021 revealed possible old blood in small bowel and possible ulcerations in colon prompting repeat colo in 2021 which revealed only TA polyps w/ 5yr recall   2. CHF  3. A fib - not on anticoagulation  4. CKD   5. HTN  6. CVA (2019)    Plan:     1. Consider repeat EGD as IP/PO pending clinical course - timing deferred to this weeks GI team.   2. Monitor H/H, transfuse if Hgb <7.  3. IV PPI BID.  4. OK for clear liquid diet today from GI perspective.  5. Continue medical management per primary team.    Chief Complaint: acute on chronic anemia      HPI:  Amish Shetty is a 68 y.o. male w/ PMH HTN, DM, CVA (2019), A fib, chronic ADELINE (followed by [facility]), CKD  presented to the ED 3/17 with progressive SOB x 2wks. Patient denies chest pain, abd pain, n/v, or hematochezia. ED workup revealed hgb 5.8 (baseline in 7-9 range), MCV nml, PLT nml, iron 22, ferritin nml, ISAT 7, FOB positive, NT Pro-BNP 13,470, troponin nml.     PMH:   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     On Allopurinol    Chronic hypokalemia     Persistent chronic hypokalemia + hypertension; ?primary hyperaldosteronism    CKD (chronic kidney disease)     Hypertension     Obesity, Class I, BMI 30-34.9     Obstructive  without complication, sequela    Primary hypertension       Home Medications:     [unfilled]    Review of Systems:     Constitutional: No fevers, chills, weight loss, fatigue.   Skin: No rashes, pruritis, jaundice, ulcerations, erythema.   HENT: No headaches, nosebleeds, sinus pressure, rhinorrhea, sore throat.   Eyes: No visual changes, blurred vision, eye pain, photophobia, jaundice.   Cardiovascular: No chest pain, heart palpitations.   Respiratory: SOB. No cough, wheezing, chest discomfort, orthopnea.   Gastrointestinal: See HPI    Genitourinary: No dysuria, bleeding, discharge, pyuria.   Musculoskeletal: No weakness, arthralgias, wasting.   Endo: No sweats.   Heme: No bruising, easy bleeding.   Allergies: As noted.   Neurological: Cranial nerves intact.  Alert and oriented. Gait not assessed.   Psychiatric:  No anxiety, depression, hallucinations.          BP (!) 169/74   Pulse 63   Temp 97.4 °F (36.3 °C) (Oral)   Resp 10   Ht 1.753 m (5' 9\")   Wt 99.3 kg (219 lb)   SpO2 96%   BMI 32.34 kg/m²     Physical Assessment:     constitutional: normal habitus, in no acute distress.   skin: no rashes, ulcers, icterus or other lesions  eyes: normal conjunctivae and lids; no jaundice pupils: normal  HEENT: normocephalic, atraumatic  neck: supple, normal ROM   respiratory: normal chest excursion; no intercostal retraction or accessory muscle use  abdominal: non-distended, active bowel sounds, soft, non-tender, non-acute, no palpable masses or hernias. liver/spleen: not palpable.   extremities: no significant deformity or contracture, no edema. Gait not assessed   neurologic: cranial nerves: grossly normal.  psychiatric: judgement/insight: within normal limits. memory: within normal limits for recent and remote events. mood and affect: no evidence of depression, anxiety or agitation. orientation: oriented to time, space and person.        Basic Metabolic Profile   Recent Labs     03/17/24  1009   *   K 3.1*

## 2024-03-17 NOTE — ED NOTES
Pt roomed in ED16, placed in gown. On cont VS monitoring. Pt placed on external catheter. HOB elevated per comfort.     Pt placed on 2L O2 per comfort due to dyspnea upon exertion. Pt personal clothing removed,  yellow  socks applied

## 2024-03-17 NOTE — PROGRESS NOTES
Patient arrived via stretcher from ED to room 408. Pt made comfortable. [place] connected to suction. Pt is alert and oriented x3 02 at 3 liters on. Patient come up from ED with blood hanging. No distress is noted. Patient's wife come and left personal belonging along with the patient's personal cpap

## 2024-03-17 NOTE — ED TRIAGE NOTES
Pt arrived to ED via EMS with cc of SOB x4-5 days ago, worsens with exertion and laying down. Pt has hx of CHF / CVA    Right arm noted to be swollen, appears jaleesa fluid retention     Takes lasix at home     Denies Chest pain/Nausea     Per EMS  Afebrile  Dry cough  24 rr  165/58  56 HR  NSR per ems EKG   End tidal 30's       Pt ambulatory to ER stretcher form EMS stretcher. Cane used. Placed on 2L O2

## 2024-03-17 NOTE — H&P
History and Physical          Attestation:  Patient seen and examined independently and I agree with the assessment and plan per LUCITA Ramírez.  Patient presented to the ED with hemoglobin 5.8, YARIEL on CKD 3.  Noted Hemoccult positive.  GI consulted per ED attending.  Patient denies any recent bleeding.  States he is never had a blood transfusion before.  States he had a stroke in 2019, uses e-stim at home for his right upper extremity.  At baseline he walks with a cane due to right lower extremity weakness from the stroke.  Patient reports ever since his admission a couple of months ago, he has noted decreased exercise tolerance and has had for home physical therapy, but still with dyspnea on exertion.  He receives Venofer infusions every other month per hematology Dr. Carrera at [facility].  He denies any episode of chest pain, nausea vomiting.    Patient had EGD and colonoscopy in 2019 without significant findings.  PillCam study in 2021 revealed possible old clot in small bowel, and possible ulcerations in colon prompting repeat colo in 2021 which revealed only TA polyps w/ 5yr recall GI to consider repeat EGD pending clinical course, GI input appreciated.  Continue PPI IV twice daily.  Avoid further diuretics unless needed after second unit of blood.  Nephrology consulted, input appreciated.  Full code.  Admit to telemetry.          Subjective     HPI: Amish Shetty is a 68 y.o. male with a PMHx of stroke, gout, CKD, hypertension, obesity, sleep apnea, DMT2 who presented to the ED with complaints of progressive shortness of breath over 2 weeks with mild exertion.  Denies orthopnea.  Denies associated chest pain.  Patient states he is compliant with his medications as prescribed.  States he takes the Lasix twice a day as prescribed.  States no swelling throughout his body is advised, but also not improving since last hospitalization.  Currently upon my evaluation, patient receiving first unit of PRBC.  Tolerating well.   emergency room physician's encounter.    Time spent reviewing records, independently interpreting results, obtaining history from patient or caregiver, performing physical exam, ordering tests and medications, communicating with specialists, documenting in the chart, and coordinating overall care is 75 minutes     KAM Alicea  [facility]  Hospitalist Division  Office:  [phone]

## 2024-03-17 NOTE — ED NOTES
RN performed full bed change, as pt external catheter leaked on pt and saturated linens.     New gown applied, new sheets, chux, linens and blankets applied.     New external catheter applied

## 2024-03-17 NOTE — ED PROVIDER NOTES
EMERGENCY DEPARTMENT HISTORY AND PHYSICAL EXAM      Date: 3/17/2024  Patient Name: Amish Shetty      History of Presenting Illness     Chief Complaint   Patient presents with    Shortness of Breath       Location/Duration/Severity/Modifying factors   Chief Complaint   Patient presents with    Shortness of Breath       HPI:  Amish Shetty is a 68 y.o. male with PMH significant for hypertension, peripheral edema on Lasix, type 2 diabetes, stage II chronic kidney disease presents with 2 weeks of progressively worsening shortness of breath with mild exertion, specifically walking across his home as well as shortness of breath while lying flat.  No discontinuation of the swelling to his legs and worsening of his right arm swelling has been present for the past few months.  Pt  denies chest pain or pressure, dry or productive cough, fevers.  Has been compliant with his Lasix 20 mg twice daily. Does have history of atrial fibrillation but does not appear to be on an oral anticoagulant.    PCP: Livia Nicole, APRN - NP    Current Facility-Administered Medications   Medication Dose Route Frequency Provider Last Rate Last Admin    0.9 % sodium chloride infusion   IntraVENous PRN Nick Hinojosa DO         Current Outpatient Medications   Medication Sig Dispense Refill    furosemide (LASIX) 20 MG tablet Take 1 tablet by mouth 2 times daily      atorvastatin (LIPITOR) 40 MG tablet Take 1 tablet by mouth daily 90 tablet 3    hydrALAZINE (APRESOLINE) 100 MG tablet Take 1 tablet by mouth in the morning and 1 tablet in the evening. 90 tablet 3    bumetanide (BUMEX) 0.5 MG tablet Take 1 tablet by mouth 2 times daily (Patient not taking: Reported on 2/12/2024) 60 tablet 2    finasteride (PROSCAR) 5 MG tablet Take 1 tablet by mouth daily 90 tablet 3    neomycin-bacitracin-polymyxin (NEOSPORIN) 5-400-5000 ointment Apply topically 4 times daily. 453.9 g 1    Lancets MISC Prick finger with lancet once a day in AM before

## 2024-03-17 NOTE — ED NOTES
RN unable to obtain PIV, MD and charge nurse aware. Pt is hard stick, US guided PIV most likely needed.     Pt in need of blood product.

## 2024-03-17 NOTE — CONSENT
Informed Consent for Blood Component Transfusion Note    I have discussed with the patient the rationale for blood component transfusion; its benefits in treating or preventing fatigue, organ damage, or death; and its risk which includes mild transfusion reactions, rare risk of blood borne infection, or more serious but rare reactions. I have discussed the alternatives to transfusion, including the risk and consequences of not receiving transfusion. The patient had an opportunity to ask questions and had agreed to proceed with transfusion of blood components.    Electronically signed by JAMIE MARTINEZ DO on 3/17/24 at 10:35 AM EDT

## 2024-03-17 NOTE — ED NOTES
Blood is ready at blood bank     Vascular currently at bedside; rn will  blood once procedure is finished

## 2024-03-17 NOTE — ED NOTES
TRANSFER - OUT REPORT:    Verbal report given to FRANSISCA Barroso on Amish Shetty  being transferred to [facility] for routine progression of patient care       Report consisted of patient's Situation, Background, Assessment and   Recommendations(SBAR).     Information from the following report(s) Nurse Handoff Report, ED SBAR, Adult Overview, Recent Results, Quality Measures, and Neuro Assessment was reviewed with the receiving nurse.    [place] Fall Assessment:    Presents to emergency department  because of falls (Syncope, seizure, or loss of consciousness): No  Age > 70: No  Altered Mental Status, Intoxication with alcohol or substance confusion (Disorientation, impaired judgment, poor safety awaremess, or inability to follow instructions): No  Impaired Mobility: Ambulates or transfers with assistive devices or assistance; Unable to ambulate or transer.: Yes  Nursing Judgement: Yes          Lines:   Peripheral IV 03/17/24 Distal;Left;Anterior Cephalic (Active)        Opportunity for questions and clarification was provided.      Patient transported with:  Registered Nurse

## 2024-03-18 VITALS
DIASTOLIC BLOOD PRESSURE: 66 MMHG | HEART RATE: 61 BPM | RESPIRATION RATE: 16 BRPM | SYSTOLIC BLOOD PRESSURE: 148 MMHG | TEMPERATURE: 97.8 F | OXYGEN SATURATION: 97 %

## 2024-03-18 LAB
ANION GAP SERPL CALC-SCNC: 10 MMOL/L (ref 3–18)
ANION GAP SERPL CALC-SCNC: 10 MMOL/L (ref 3–18)
BASOPHILS # BLD: 0.1 K/UL (ref 0–0.1)
BASOPHILS NFR BLD: 1 % (ref 0–2)
BUN SERPL-MCNC: 50 MG/DL (ref 7–18)
BUN SERPL-MCNC: 53 MG/DL (ref 7–18)
BUN/CREAT SERPL: 16 (ref 12–20)
BUN/CREAT SERPL: 17 (ref 12–20)
CALCIUM SERPL-MCNC: 8.4 MG/DL (ref 8.5–10.1)
CALCIUM SERPL-MCNC: 8.4 MG/DL (ref 8.5–10.1)
CHLORIDE SERPL-SCNC: 107 MMOL/L (ref 100–111)
CHLORIDE SERPL-SCNC: 112 MMOL/L (ref 100–111)
CO2 SERPL-SCNC: 26 MMOL/L (ref 21–32)
CO2 SERPL-SCNC: 27 MMOL/L (ref 21–32)
CREAT SERPL-MCNC: 3.05 MG/DL (ref 0.6–1.3)
CREAT SERPL-MCNC: 3.15 MG/DL (ref 0.6–1.3)
DIFFERENTIAL METHOD BLD: ABNORMAL
EOSINOPHIL # BLD: 0.1 K/UL (ref 0–0.4)
EOSINOPHIL NFR BLD: 1 % (ref 0–5)
ERYTHROCYTE [DISTWIDTH] IN BLOOD BY AUTOMATED COUNT: 15.5 % (ref 11.6–14.5)
GLUCOSE BLD STRIP.AUTO-MCNC: 108 MG/DL (ref 70–110)
GLUCOSE BLD STRIP.AUTO-MCNC: 155 MG/DL (ref 70–110)
GLUCOSE BLD STRIP.AUTO-MCNC: 175 MG/DL (ref 70–110)
GLUCOSE SERPL-MCNC: 89 MG/DL (ref 74–99)
GLUCOSE SERPL-MCNC: 93 MG/DL (ref 74–99)
HCT VFR BLD AUTO: 19.9 % (ref 36–48)
HCT VFR BLD AUTO: 23.1 % (ref 36–48)
HCT VFR BLD AUTO: 23.4 % (ref 36–48)
HGB BLD-MCNC: 5.9 G/DL (ref 13–16)
HGB BLD-MCNC: 7.1 G/DL (ref 13–16)
HGB BLD-MCNC: 7.1 G/DL (ref 13–16)
HISTORY CHECK: NORMAL
IMM GRANULOCYTES # BLD AUTO: 0.1 K/UL (ref 0–0.04)
IMM GRANULOCYTES NFR BLD AUTO: 1 % (ref 0–0.5)
LYMPHOCYTES # BLD: 1.1 K/UL (ref 0.9–3.6)
LYMPHOCYTES NFR BLD: 10 % (ref 21–52)
MAGNESIUM SERPL-MCNC: 2.4 MG/DL (ref 1.6–2.6)
MCH RBC QN AUTO: 25.2 PG (ref 24–34)
MCHC RBC AUTO-ENTMCNC: 29.6 G/DL (ref 31–37)
MCV RBC AUTO: 85 FL (ref 78–100)
MONOCYTES # BLD: 1.2 K/UL (ref 0.05–1.2)
MONOCYTES NFR BLD: 10 % (ref 3–10)
NEUTS SEG # BLD: 8.7 K/UL (ref 1.8–8)
NEUTS SEG NFR BLD: 77 % (ref 40–73)
NRBC # BLD: 0.02 K/UL (ref 0–0.01)
NRBC BLD-RTO: 0.2 PER 100 WBC
PLATELET # BLD AUTO: 283 K/UL (ref 135–420)
PMV BLD AUTO: 9.8 FL (ref 9.2–11.8)
POTASSIUM SERPL-SCNC: 2.7 MMOL/L (ref 3.5–5.5)
POTASSIUM SERPL-SCNC: 2.8 MMOL/L (ref 3.5–5.5)
POTASSIUM SERPL-SCNC: 3.4 MMOL/L (ref 3.5–5.5)
RBC # BLD AUTO: 2.34 M/UL (ref 4.35–5.65)
SODIUM SERPL-SCNC: 143 MMOL/L (ref 136–145)
SODIUM SERPL-SCNC: 149 MMOL/L (ref 136–145)
WBC # BLD AUTO: 11.3 K/UL (ref 4.6–13.2)

## 2024-03-18 PROCEDURE — 99232 SBSQ HOSP IP/OBS MODERATE 35: CPT | Performed by: HOSPITALIST

## 2024-03-18 PROCEDURE — 85025 COMPLETE CBC W/AUTO DIFF WBC: CPT

## 2024-03-18 PROCEDURE — 6370000000 HC RX 637 (ALT 250 FOR IP): Performed by: HOSPITALIST

## 2024-03-18 PROCEDURE — 85018 HEMOGLOBIN: CPT

## 2024-03-18 PROCEDURE — 82962 GLUCOSE BLOOD TEST: CPT

## 2024-03-18 PROCEDURE — 6370000000 HC RX 637 (ALT 250 FOR IP)

## 2024-03-18 PROCEDURE — 2580000003 HC RX 258

## 2024-03-18 PROCEDURE — 6360000002 HC RX W HCPCS

## 2024-03-18 PROCEDURE — 84132 ASSAY OF SERUM POTASSIUM: CPT

## 2024-03-18 PROCEDURE — 1100000003 HC PRIVATE W/ TELEMETRY

## 2024-03-18 PROCEDURE — 6360000002 HC RX W HCPCS: Performed by: INTERNAL MEDICINE

## 2024-03-18 PROCEDURE — 97162 PT EVAL MOD COMPLEX 30 MIN: CPT

## 2024-03-18 PROCEDURE — C9113 INJ PANTOPRAZOLE SODIUM, VIA: HCPCS

## 2024-03-18 PROCEDURE — 85014 HEMATOCRIT: CPT

## 2024-03-18 PROCEDURE — P9016 RBC LEUKOCYTES REDUCED: HCPCS

## 2024-03-18 PROCEDURE — 97116 GAIT TRAINING THERAPY: CPT

## 2024-03-18 PROCEDURE — 80048 BASIC METABOLIC PNL TOTAL CA: CPT

## 2024-03-18 PROCEDURE — 6360000002 HC RX W HCPCS: Performed by: STUDENT IN AN ORGANIZED HEALTH CARE EDUCATION/TRAINING PROGRAM

## 2024-03-18 PROCEDURE — 2700000000 HC OXYGEN THERAPY PER DAY

## 2024-03-18 PROCEDURE — 83735 ASSAY OF MAGNESIUM: CPT

## 2024-03-18 PROCEDURE — 97535 SELF CARE MNGMENT TRAINING: CPT

## 2024-03-18 PROCEDURE — 36415 COLL VENOUS BLD VENIPUNCTURE: CPT

## 2024-03-18 PROCEDURE — 94640 AIRWAY INHALATION TREATMENT: CPT

## 2024-03-18 PROCEDURE — 36430 TRANSFUSION BLD/BLD COMPNT: CPT

## 2024-03-18 PROCEDURE — 6360000002 HC RX W HCPCS: Performed by: HOSPITALIST

## 2024-03-18 PROCEDURE — 97166 OT EVAL MOD COMPLEX 45 MIN: CPT

## 2024-03-18 PROCEDURE — 97530 THERAPEUTIC ACTIVITIES: CPT

## 2024-03-18 RX ORDER — POTASSIUM CHLORIDE 20 MEQ/1
40 TABLET, EXTENDED RELEASE ORAL 3 TIMES DAILY
Status: DISCONTINUED | OUTPATIENT
Start: 2024-03-18 | End: 2024-03-18

## 2024-03-18 RX ORDER — POTASSIUM CHLORIDE 7.45 MG/ML
10 INJECTION INTRAVENOUS
Status: DISPENSED | OUTPATIENT
Start: 2024-03-18 | End: 2024-03-18

## 2024-03-18 RX ORDER — POTASSIUM CHLORIDE 20 MEQ/1
40 TABLET, EXTENDED RELEASE ORAL 2 TIMES DAILY
Status: DISCONTINUED | OUTPATIENT
Start: 2024-03-18 | End: 2024-03-21 | Stop reason: HOSPADM

## 2024-03-18 RX ORDER — SODIUM CHLORIDE 9 MG/ML
INJECTION, SOLUTION INTRAVENOUS PRN
Status: DISCONTINUED | OUTPATIENT
Start: 2024-03-18 | End: 2024-03-21 | Stop reason: HOSPADM

## 2024-03-18 RX ORDER — FUROSEMIDE 10 MG/ML
80 INJECTION INTRAMUSCULAR; INTRAVENOUS DAILY
Status: DISCONTINUED | OUTPATIENT
Start: 2024-03-18 | End: 2024-03-21 | Stop reason: HOSPADM

## 2024-03-18 RX ADMIN — IPRATROPIUM BROMIDE AND ALBUTEROL SULFATE 1 DOSE: .5; 3 SOLUTION RESPIRATORY (INHALATION) at 08:30

## 2024-03-18 RX ADMIN — FUROSEMIDE 80 MG: 10 INJECTION, SOLUTION INTRAMUSCULAR; INTRAVENOUS at 14:08

## 2024-03-18 RX ADMIN — POTASSIUM CHLORIDE 40 MEQ: 1500 TABLET, EXTENDED RELEASE ORAL at 11:30

## 2024-03-18 RX ADMIN — IPRATROPIUM BROMIDE AND ALBUTEROL SULFATE 1 DOSE: .5; 3 SOLUTION RESPIRATORY (INHALATION) at 13:02

## 2024-03-18 RX ADMIN — ARFORMOTEROL TARTRATE 15 MCG: 15 SOLUTION RESPIRATORY (INHALATION) at 19:17

## 2024-03-18 RX ADMIN — SODIUM CHLORIDE, PRESERVATIVE FREE 10 ML: 5 INJECTION INTRAVENOUS at 08:34

## 2024-03-18 RX ADMIN — IRON SUCROSE 100 MG: 20 INJECTION, SOLUTION INTRAVENOUS at 08:35

## 2024-03-18 RX ADMIN — POTASSIUM CHLORIDE 10 MEQ: 7.46 INJECTION, SOLUTION INTRAVENOUS at 08:33

## 2024-03-18 RX ADMIN — POTASSIUM CHLORIDE 10 MEQ: 7.46 INJECTION, SOLUTION INTRAVENOUS at 09:49

## 2024-03-18 RX ADMIN — SODIUM CHLORIDE, PRESERVATIVE FREE 10 ML: 5 INJECTION INTRAVENOUS at 20:45

## 2024-03-18 RX ADMIN — BUDESONIDE 1 MG: 1 SUSPENSION RESPIRATORY (INHALATION) at 08:30

## 2024-03-18 RX ADMIN — HYDRALAZINE HYDROCHLORIDE 100 MG: 50 TABLET, FILM COATED ORAL at 08:34

## 2024-03-18 RX ADMIN — AMLODIPINE BESYLATE 10 MG: 10 TABLET ORAL at 08:34

## 2024-03-18 RX ADMIN — BUDESONIDE 1 MG: 1 SUSPENSION RESPIRATORY (INHALATION) at 19:17

## 2024-03-18 RX ADMIN — HYDRALAZINE HYDROCHLORIDE 100 MG: 50 TABLET, FILM COATED ORAL at 16:40

## 2024-03-18 RX ADMIN — FINASTERIDE 5 MG: 5 TABLET, FILM COATED ORAL at 08:34

## 2024-03-18 RX ADMIN — SERTRALINE 50 MG: 50 TABLET, FILM COATED ORAL at 08:34

## 2024-03-18 RX ADMIN — ATORVASTATIN CALCIUM 40 MG: 40 TABLET ORAL at 08:34

## 2024-03-18 RX ADMIN — POTASSIUM CHLORIDE 40 MEQ: 1500 TABLET, EXTENDED RELEASE ORAL at 20:44

## 2024-03-18 RX ADMIN — SODIUM CHLORIDE, PRESERVATIVE FREE 40 MG: 5 INJECTION INTRAVENOUS at 16:40

## 2024-03-18 RX ADMIN — IPRATROPIUM BROMIDE AND ALBUTEROL SULFATE 1 DOSE: .5; 3 SOLUTION RESPIRATORY (INHALATION) at 17:08

## 2024-03-18 RX ADMIN — ARFORMOTEROL TARTRATE 15 MCG: 15 SOLUTION RESPIRATORY (INHALATION) at 08:29

## 2024-03-18 RX ADMIN — IPRATROPIUM BROMIDE AND ALBUTEROL SULFATE 1 DOSE: .5; 3 SOLUTION RESPIRATORY (INHALATION) at 19:17

## 2024-03-18 ASSESSMENT — ENCOUNTER SYMPTOMS: PAIN LOCATION - PAIN QUALITY: ACHY

## 2024-03-18 NOTE — PROGRESS NOTES
[facility] Hospitalist Group  Progress Note    Patient: Amish Shetty Age: 68 y.o. : 1955 MR#: [id number] SSN: [id number]  Date/Time: 3/18/2024     Subjective:     Patient seen evaluated, lying in bed, no acute distress.    68-year-old male with a past medical history of stroke, gout, CKD, hypertension, obesity, sleep apnea, type 2 diabetes presented to the emergency room with complaints of progressive shortness of breath over 2 weeks with mild exertion.  Patient denies any associated chest pain.  ER evaluation patient noted to have a hemoglobin of 5.8, transfused 1 unit PRBC.  GI consulted, patient admitted to the hospital for further evaluation.      Assessment/Plan:     Anemia with a hemoglobin of 5.8, status posttransfusion 1 unit PRBC, continue to monitor H&H.  Appreciate GI input, conservative management, continue to monitor H&H, IV Protonix twice daily.  Iron deficiency-continue IV Venofer x 4 doses.  H/o hypertension-resume home medications, continue to monitor blood pressure.  CKD with proteinuria status post kidney biopsy which shows amyloidosis-appreciate nephrology input, continue Lasix 80 mg daily.  Monitor strict I's and O's.  Hypokalemia-potassium repletion  DVT prophylaxis-SCDs  Full code                Mike Webster MD  24      Case discussed with:  [x]Patient  []Family  []Nursing  []Case Management  DVT Prophylaxis:  []Lovenox  []Hep SQ  [x]SCDs  []Coumadin   []On Heparin gtt    Objective:   VS:   Vitals:    24 1129   BP: (!) 145/79   Pulse: 72   Resp: 18   Temp: 98 °F (36.7 °C)   SpO2: 96%        Intake/Output Summary (Last 24 hours) at 3/18/2024 1508  Last data filed at 3/18/2024 0810  Gross per 24 hour   Intake 638.25 ml   Output 1300 ml   Net -661.75 ml       General:  Alert, cooperative, no acute distress    Pulmonary:  CTA Bilaterally. No Wheezing/Rhonchi/Rales.  Cardiovascular: Regular rate and Rhythm.  GI:  Soft, Non distended, Non tender. +  (L) 3.5 - 5.5 mmol/L    Chloride 112 (H) 100 - 111 mmol/L    CO2 27 21 - 32 mmol/L    Anion Gap 7 3.0 - 18 mmol/L    Glucose 105 (H) 74 - 99 mg/dL    BUN 54 (H) 7.0 - 18 MG/DL    Creatinine 3.29 (H) 0.6 - 1.3 MG/DL    Bun/Cre Ratio 16 12 - 20      Est, Glom Filt Rate 20 (L) >60 ml/min/1.73m2    Calcium 8.6 8.5 - 10.1 MG/DL    Total Bilirubin 0.5 0.2 - 1.0 MG/DL    ALT 23 16 - 61 U/L    AST 27 10 - 38 U/L    Alk Phosphatase 93 45 - 117 U/L    Total Protein 6.8 6.4 - 8.2 g/dL    Albumin 2.8 (L) 3.4 - 5.0 g/dL    Globulin 4.0 2.0 - 4.0 g/dL    Albumin/Globulin Ratio 0.7 (L) 0.8 - 1.7     Magnesium    Collection Time: 03/17/24 10:09 AM   Result Value Ref Range    Magnesium 2.4 1.6 - 2.6 mg/dL   Brain Natriuretic Peptide    Collection Time: 03/17/24 10:09 AM   Result Value Ref Range    NT Pro-BNP 13,470 (H) 0 - 900 PG/ML   Troponin    Collection Time: 03/17/24 10:09 AM   Result Value Ref Range    Troponin, High Sensitivity 42 0 - 78 ng/L   Iron and TIBC    Collection Time: 03/17/24 10:09 AM   Result Value Ref Range    Iron 22 (L) 50 - 175 ug/dL    TIBC 338 250 - 450 ug/dL    Iron % Saturation 7 (L) 20 - 50 %   Ferritin    Collection Time: 03/17/24 10:09 AM   Result Value Ref Range    Ferritin 60 8 - 388 NG/ML   Hemoglobin A1c    Collection Time: 03/17/24 10:09 AM   Result Value Ref Range    Hemoglobin A1C <3.8 (L) 4.2 - 5.6 %    Estimated Avg Glucose Cannot be calculated mg/dL   PREPARE RBC (CROSSMATCH), 1 Units    Collection Time: 03/17/24 10:45 AM   Result Value Ref Range    History Check Historical check performed    Occult Blood, Fecal    Collection Time: 03/17/24 10:45 AM   Result Value Ref Range    POC Occult Blood, Fecal Positive (A) NEG     TYPE AND SCREEN    Collection Time: 03/17/24 10:59 AM   Result Value Ref Range    Crossmatch expiration date 03/20/2024,2359     ABO/Rh AB POSITIVE     Antibody Screen NEG     CALLED TO ARNIE WILSON, 1143, 3/17/24 BY [id number]     Called To: ANTONIETA MEHTA, AT 0330, ON 03/18/2024, BY

## 2024-03-18 NOTE — CARE COORDINATION
03/18/24 1302   Service Assessment   Patient Orientation Alert and Oriented;Person;Place;Situation;Self   Cognition Alert   History Provided By Patient   Primary Caregiver Self   Support Systems Spouse/Significant Other   PCP Verified by CM Yes  (Patient's PCP is Beth Cheema with [facility].)   Last Visit to PCP Within last 3 months   Prior Functional Level Independent in ADLs/IADLs   Current Functional Level Assistance with the following:;Bathing;Dressing;Toileting   Can patient return to prior living arrangement Yes   Ability to make needs known: Good   Family able to assist with home care needs: Yes   Financial Resources Medicare   Community Resources None   Social/Functional History   Lives With Spouse   Type of Home House   Home Layout One level   Home Access Stairs to enter without rails   Entrance Stairs - Number of Steps 1 Step to enter the home.   Bathroom Shower/Tub Tub/Shower unit   Bathroom Toilet Standard   Bathroom Equipment Grab bars in shower   Bathroom Accessibility Accessible   Home Equipment Cane, quad  (CPAP Machine)   Receives Help From Family   ADL Assistance Needs assistance   Toileting Needs assistance   Homemaking Assistance Needs assistance   Ambulation Assistance Independent   Transfer Assistance Independent   Active  Yes   Occupation Retired   Discharge Planning   Type of Residence House   Living Arrangements Spouse/Significant Other   Current Services Prior To Admission C-pap;Durable Medical Equipment   Current DME Prior to Arrival Cpap;Cane  (Quad Cane)   Potential Assistance Needed Home Care   DME Ordered? No   Potential Assistance Purchasing Medications No   Type of Home Care Services OT;PT;Skilled Therapy;Nursing Services   Patient expects to be discharged to: House  (with Home Health Services, and Family Assistance)   Services At/After Discharge   Transition of Care Consult (CM Consult) Home Health   Internal Home Health Yes   Services At/After Discharge Home

## 2024-03-18 NOTE — CARE COORDINATION
CM spoke with patient at the bedside.   Patient is refusing SNF, patient said he has stayed at SNF before, and felt he did not get the Rehab needed at SNF.   Patient is agreeable to Home Health Services, and FOC verbal consent given for [facility].   Patient uses DME Quad Cane, and CPAP machine at home.  Patient said his wife will be transporting patient home when medically stable for discharge.         JOHN spoke with Augustus Barnes with [facility], said they can take patient.   CM put patient in the queue for [facility].             Ele Hunter, RN  Case Management [phone]

## 2024-03-18 NOTE — PROGRESS NOTES
Advance Care Planning   Healthcare Decision Maker:    Primary Decision Maker (Active): Mary Shetty - Spouse - [phone]    Click here to complete Healthcare Decision Makers including selection of the Healthcare Decision Maker Relationship (ie \"Primary\").  Today we documented Decision Maker(s) consistent with Legal Next of Kin hierarchy.        conducted an initial consultation and Spiritual Assessment for Amish Shetty, who is a 68 y.o.,male. Patient's Primary Language is: English.   According to the patient's EMR Buddhism Affiliation is: Anglican.     The reason the Patient came to the hospital is:   Patient Active Problem List    Diagnosis Date Noted    Acute on chronic anemia 03/17/2024    Nephrotic syndrome 02/04/2024    Anasarca 02/04/2024    Skin tear of forearm without complication, sequela 02/04/2024    Primary hypertension 02/04/2024    Pericardial effusion 02/01/2024    Acute on chronic heart failure with preserved ejection fraction (HCC) 02/01/2024    History of CVA (cerebrovascular accident) 02/01/2024    YARIEL (acute kidney injury) (HCC) 01/30/2024    Gout 01/30/2024    Atrial fibrillation ([facility]) 11/03/2023    Proteinuria 11/02/2023    Obstructive sleep apnea on CPAP     Obesity, Class I, BMI 30-34.9     Hypertensive heart and kidney disease without heart failure and with stage 2 chronic kidney disease     Hypokalemia     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

## 2024-03-18 NOTE — PROGRESS NOTES
Not on file   Social History Narrative    Not on file     Social Determinants of Health     Financial Resource Strain: Not on file   Food Insecurity: No Food Insecurity (1/29/2024)    Hunger Vital Sign     Worried About Running Out of Food in the Last Year: Never true     Ran Out of Food in the Last Year: Never true   Transportation Needs: No Transportation Needs (2/4/2024)    OASIS : Transportation     Lack of Transportation (Medical): No     Lack of Transportation (Non-Medical): No     Patient Unable or Declines to Respond: No   Physical Activity: Not on file   Stress: Not on file   Social Connections: Feeling Socially Integrated (2/4/2024)    OASIS : Social Isolation     Frequency of experiencing loneliness or isolation: Never   Intimate Partner Violence: Not on file   Housing Stability: Low Risk  (1/29/2024)    Housing Stability Vital Sign     Unable to Pay for Housing in the Last Year: No     Number of Places Lived in the Last Year: 1     Unstable Housing in the Last Year: No       Family History   Problem Relation Age of Onset    Prostate Cancer Father     Heart Disease Father     Cancer Mother         Gastric cancer     No Known Allergies     Home Medications:     Prior to Admission Medications   Prescriptions Last Dose Informant Patient Reported? Taking?   Lancets MISC   Yes No   Sig: Prick finger with lancet once a day in AM before breakfast or PRN to monitor blood sugar (E11.21)   Multiple Vitamin (MULTIVITAMIN PO)   Yes No   Sig: Take 1 tablet by mouth daily   amLODIPine (NORVASC) 10 MG tablet   Yes No   Sig: Take 1 tablet by mouth daily   aspirin 81 MG chewable tablet   Yes No   Sig: Take 1 tablet by mouth daily (with breakfast)   atorvastatin (LIPITOR) 40 MG tablet   No No   Sig: Take 1 tablet by mouth daily   bumetanide (BUMEX) 0.5 MG tablet Not Taking  No No   Sig: Take 1 tablet by mouth 2 times daily   Patient not taking: Reported on 2/12/2024   ferrous sulfate (IRON 325) 325 (65 Fe) MG  \"DBIL\"   Thyroid Studies Lab Results   Component Value Date/Time    TSH 4.63 08/19/2019 06:55 AM         EKG: atrial fibrillation     Physical Assessment:   Visit Vitals  BP (!) 145/79   Pulse 72   Temp 98 °F (36.7 °C) (Oral)   Resp 18   Ht 1.753 m (5' 9\")   Wt 99.3 kg (219 lb)   SpO2 96%   BMI 32.34 kg/m²     Weight change:     Intake/Output Summary (Last 24 hours) at 3/18/2024 1236  Last data filed at 3/18/2024 0810  Gross per 24 hour   Intake 638.25 ml   Output 1300 ml   Net -661.75 ml     Physical Exam:   General: comfortable, no acute distress   HEENT sclera anicteric, supple neck, no thyromegaly  CVS: S1S2 heard,  no rub  RS: + air entry b/l,   Abd: Soft, Non tender,   Neuro: non focal, awake, alert ,   Extrm: ++edema, no cyanosis, clubbing   Skin: no visible  Rash  Musculoskeletal: No gross joints or bone deformities     Procedures/imaging: see electronic medical records for all procedures, Xrays and details which were not copied into this note but were reviewed prior to creation of Plan            Papito Ramírez MD  March 18, 2024  [facility]  Office [phone]

## 2024-03-18 NOTE — PROGRESS NOTES
[email or web address]  [phone]    Gastroenterology follow up-Progress note    Impression:  1. Acute on chronic ADELINE   - Hgb 5.8 upon admission w/ baseline in 7-9 range. FOB positive. 3/17 NM scan neg. No overt GI bleeding, abd pain, or n/v.   - Followed by [facility] as OP w/ dx of ADELINE and anemia of chronic disease; received IV iron last 6/2023 but should be on PO iron now, also on q8wk darbepoeitin per 12/2023 [facility] note.   - GI w/u of chronic ADELINE includes EGD-[facility] in 2019 w/o significant findings. Pill cam study in 2021 revealed possible old blood in small bowel and possible ulcerations in colon prompting repeat colo in 2021 which revealed only TA polyps w/ 5yr recall   - s/p 2 PRBC and 1 IV Iron  2. CHF  3. A fib - not on anticoagulation  4. CKD   5. HTN  6. CVA (2019)    Plan:  1. Repeat H/H s/p transfusion  2. Monitor H/H, transfuse for Hgb <7  3. Continue BID PPI  4. Ok for diet in absence of overt bleeding  5. Consider EGD pending clinical course and response to transfusion   6. Consider hematology consult if inadequate response to transfusion    Chief Complaint: acute on chronic anemia      Subjective:  Denies occult or overt bleeding. Very hungry, requests diet.    ROS: Denies any fevers, chills, rash.       General: well developed, well nourished, no acute distress  Eyes: conjunctiva normal, EOM normal  Cardiovascular: heart normal, intact distal pulses, normal rate and regular rhythm  Pulmonary: breath sounds normal and effort normal  Abdominal: appearance normal, bowel sounds normal and soft, non-acute, non-tender     Patient Active Problem List   Diagnosis    Secondary hyperparathyroidism of renal origin (HCC)    Impaired mobility and ADLs    Right hemiparesis (HCC)    Current use of aspirin    Obstructive sleep apnea on CPAP    Obesity, Class I, BMI 30-34.9    Dysarthria    CKD (chronic kidney disease) stage 2, GFR 60-89 ml/min    Hypertensive heart and kidney disease without heart failure and with stage 2

## 2024-03-18 NOTE — PLAN OF CARE
Problem: Occupational Therapy - Adult  Goal: By Discharge: Performs self-care activities at highest level of function for planned discharge setting.  See evaluation for individualized goals.  Description: Occupational Therapy Goals:  Initiated 3/18/2024 to be met within 7-10 days.    1.  Patient will perform grooming with supervision/set-up in standing for > 2 min with Good balance.   2.  Patient will perform bathing with supervision/set-up.  3.  Patient will perform lower body dressing with supervision/set-up for seated and standing aspects.  4.  Patient will perform toilet transfers with supervision/set-up.  5.  Patient will perform all aspects of toileting with supervision/set-up.  6.  Patient will participate in upper extremity therapeutic exercise/activities with supervision/set-up for 8 minutes to improve endurance and UB strength needed for ADLs    7.  Patient will utilize energy conservation techniques during functional activities with verbal cues.    PLOF: Pt lives with spouse, reports being mostly Mod Ind for ADLs and functional mobility with cane. Spouse assists prn.  Outcome: Progressing  OCCUPATIONAL THERAPY EVALUATION    Patient: Amish Shetty (68 y.o. male)  Date: 3/18/2024  Primary Diagnosis: Edema [R60.9]  Hypervolemia, unspecified hypervolemia type [E87.70]  Iron deficiency anemia, unspecified iron deficiency anemia type [D50.9]  Acute on chronic anemia [D64.9]  Acute renal failure superimposed on stage 3b chronic kidney disease, unspecified acute renal failure type (HCC) [N17.9, N18.32]       Precautions: Fall Risk    ASSESSMENT :    Pt is motivated to participate in OT this pm. RUE with residual deficits from CVA in 2019, noted limited AROM and edema (strength grossly 2 to 2+/5). Pt maneuvered to EOB with CGA, demod Good sitting balance. Pt doffed/donned sock with LUE, benefited from CGA due to SOB with bending fwd, CGA to don gown with compensatory technique, cues for pacing. SBA for sit->std  11/2/2023    CT BIOPSY RENAL 11/2/2023 [facility] RAD CT    TONSILLECTOMY         Home Situation:   Social/Functional History  Lives With: Spouse  Type of Home: House  Home Layout: One level  Home Access: Stairs to enter without rails  Entrance Stairs - Number of Steps: 1 Step to enter the home.  Bathroom Shower/Tub: Tub/Shower unit  Bathroom Toilet: Standard  Bathroom Equipment: Grab bars in shower  Bathroom Accessibility: Accessible  Home Equipment: Cane, quad (CPAP Machine)  Has the patient had two or more falls in the past year or any fall with injury in the past year?: Yes  Receives Help From: Family  ADL Assistance: Needs assistance  Toileting: Needs assistance  Homemaking Assistance: Needs assistance  Ambulation Assistance: Independent  Transfer Assistance: Independent  Active : Yes  Occupation: Retired  []  Right hand dominant   [x]  Left hand dominant    Cognitive/Behavioral Status:  Orientation  Overall Orientation Status: Within Functional Limits  Orientation Level: Oriented X4  Cognition  Overall Cognitive Status: WNL    Skin: bruising  Edema: moderate in RUE    Vision/Perceptual:    Vision  Vision: Within Functional Limits        Coordination: BUE  Coordination: Generally decreased, functional (RUE not functional)        Balance:     Balance  Sitting: Intact  Standing: Impaired  Standing - Static: Good  Standing - Dynamic: Fair    Strength: BUE  Strength: Generally decreased, functional (RUE grossly 2+/5)    Tone & Sensation: BUE  Tone: Abnormal (RUE)  Sensation: Intact    Range of Motion: BUE  AROM: Generally decreased, functional (RUE limited AROM)         Functional Mobility and Transfers for ADLs:  Bed Mobility:     Bed Mobility Training  Bed Mobility Training: Yes  Supine to Sit: Contact-guard assistance  Sit to Supine: Stand-by assistance  Scooting: Stand-by assistance  Transfers:      Transfer Training  Transfer Training: Yes  Sit to Stand: Stand-by assistance  Stand to Sit: Stand-by

## 2024-03-18 NOTE — HOME HEALTH
hemiplegic gait compared to independent in household ambulation (50-100ft) using cane and requires supervision to ambulate 150+ft outdoors using cane. Patient requires max cues to shorten step length on R to improve gait mechanics and safety during reassessment visit.    STAIRS: negotiates 2 outside steps  with railing with set up assistance  compared to supervision assistance  during reassessment visit.    ASSESSMENT: Patient has met the goals established at the start of care. Patient d/c to self, family and under supervision of MD. Pt instructed to continue to perform HEP and recommend using AD for safe transfers and ambulation to prevent falls. Pt verbalized understanding.  .  PATIENT EDUCATION PROVIDED THIS VISIT TO INCLUDE:   FALL PREVENTION TECHNIQUES: monitor medication that may alter mental status, keeping walking pathways clean and clear of clutter and throw rugs, keeping night lights on for ability to see and easily, good visibility for safe transitioning thresholds and proper use and good compliance of using AD.  PAIN MANAGEMENT TECHNIQUES: positioning and relaxation.  PRESSURE ULCER PREVENTION TECHNIQUES: proper positioning strategies including frequency of repositioning, prevention of shear and friction, appropriate skin hygiene and protection from moisture.  ENERGY CONSERVATION TECHNIQUES: rest, slow down, pacing, and complete tasks in manageable steps.  SWELLING MANAGEMENT: elevate LEs, ankle pumps.      PATIENT LEVEL OF UNDERSTANDING OF EDUCATION PROVIDED: Patient verbalized understanding and able to teach back information provided.    PATIENT RESPONSE TO PROCEDURE PERFORMED: Patient is now independent in indoor mobility , supervision assistance for outdoor mobility using quad cane.    Patient/caregiver instructed to contact MD if medical issue arises. Pt/cg verbalized understanding.  Care coordination done with PTA regarding POC, progress made and d/c plans.

## 2024-03-18 NOTE — PLAN OF CARE
to increase the patient's independence.     This AMPAC score should be considered in conjunction with interdisciplinary team recommendations to determine the most appropriate discharge setting. Patient's social support, diagnosis, medical stability, and prior level of function should also be taken into consideration.     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     On Allopurinol    Chronic hypokalemia     Persistent chronic hypokalemia + hypertension; ?primary hyperaldosteronism    CKD (chronic kidney disease)     Hypertension     Obesity, Class I, BMI 30-34.9     Obstructive sleep apnea on CPAP     Pure hypercholesterolemia     Received intravenous tissue plasminogen activator (tPA) in emergency department 8/12/2019    Secondary hyperparathyroidism of renal origin ([facility]) 8/18/2019    PTH (8/18/2019) = 101.7    Type 2 diabetes mellitus with stage 2 chronic kidney disease ([facility])     HbA1c (8/13/2019) = 6.6 no meds    Vitamin B12 deficiency anemia 8/14/2019    Vitamin B12 (8/14/2019) = 208    Vitamin D deficiency 8/19/2019    Vitamin D 25-Hydroxy (8/19/2019) = 16.2      Past Surgical History:   Procedure Laterality Date    COLONOSCOPY N/A 4/15/2019    COLONOSCOPY performed by Héctor Piedra MD at [facility] ENDOSCOPY    COLONOSCOPY N/A 12/20/2021    COLONOSCOPY with polypectomies performed by Héctor Piedra MD at [facility] ENDOSCOPY    CT BIOPSY RENAL  11/2/2023    CT BIOPSY RENAL 11/2/2023 [facility] RAD CT    TONSILLECTOMY         Home Situation:  Social/Functional History  Lives With: Spouse  Type of Home: House  Home Layout: One level  Home Access: Stairs to enter without rails  Entrance Stairs -  Number of Steps: 1  Home Equipment: Cane, quad  Has the patient had two or more falls in the past year or any fall with injury in the past year?: Yes  Ambulation Assistance: Independent (mod I QC)  Transfer Assistance: Independent  Critical Behavior:  Orientation  Overall Orientation Status: Within Normal Limits  Orientation Level: Oriented X4  Cognition  Overall Cognitive Status: WNL    Strength:    Strength: Generally decreased, functional    Tone & Sensation:   Tone: Normal  Sensation: Intact    Range Of Motion:  AROM: Generally decreased, functional  PROM: Generally decreased, functional    Functional Mobility:  Bed Mobility:     Bed Mobility Training  Bed Mobility Training: Yes  Supine to Sit: Moderate assistance  Scooting: Moderate assistance  Transfers:     Transfer Training  Transfer Training: Yes  Sit to Stand: Minimum assistance  Stand to Sit: Minimum assistance  Bed to Chair: Moderate assistance  Balance:               Balance  Sitting: Intact  Standing: Impaired  Standing - Static: Fair  Standing - Dynamic: Fair (-)          Ambulation/Gait Training:                       Gait  Gait Training: Yes  Overall Level of Assistance: Moderate assistance  Distance (ft): 8 Feet  Assistive Device: Cane, quad  Base of Support: Shift to left  Speed/Marce: Slow;Shuffled  Step Length: Right shortened;Left shortened  Gait Abnormalities: Circumduction;Decreased step clearance                  Pain:  Pain level pre-treatment: 0/10   Pain level post-treatment: 0/10       Activity Tolerance:   Activity Tolerance: Patient tolerated evaluation without incident  Please refer to the flowsheet for vital signs taken during this treatment.    After treatment:   [x]         Patient left in no apparent distress sitting up in chair  []         Patient left in no apparent distress in bed  [x]         Call bell left within reach  [x]         Nursing notified  []         Caregiver present  []         Bed alarm activated  []

## 2024-03-19 LAB
ANION GAP SERPL CALC-SCNC: 8 MMOL/L (ref 3–18)
BASOPHILS # BLD: 0.1 K/UL (ref 0–0.1)
BASOPHILS NFR BLD: 1 % (ref 0–2)
BUN SERPL-MCNC: 49 MG/DL (ref 7–18)
BUN/CREAT SERPL: 15 (ref 12–20)
CALCIUM SERPL-MCNC: 8.1 MG/DL (ref 8.5–10.1)
CHLORIDE SERPL-SCNC: 108 MMOL/L (ref 100–111)
CO2 SERPL-SCNC: 26 MMOL/L (ref 21–32)
CREAT SERPL-MCNC: 3.35 MG/DL (ref 0.6–1.3)
DIFFERENTIAL METHOD BLD: ABNORMAL
EOSINOPHIL # BLD: 0.1 K/UL (ref 0–0.4)
EOSINOPHIL NFR BLD: 1 % (ref 0–5)
ERYTHROCYTE [DISTWIDTH] IN BLOOD BY AUTOMATED COUNT: 15.9 % (ref 11.6–14.5)
GLUCOSE BLD STRIP.AUTO-MCNC: 139 MG/DL (ref 70–110)
GLUCOSE BLD STRIP.AUTO-MCNC: 170 MG/DL (ref 70–110)
GLUCOSE BLD STRIP.AUTO-MCNC: 177 MG/DL (ref 70–110)
GLUCOSE BLD STRIP.AUTO-MCNC: 98 MG/DL (ref 70–110)
GLUCOSE SERPL-MCNC: 98 MG/DL (ref 74–99)
HCT VFR BLD AUTO: 21.4 % (ref 36–48)
HGB BLD-MCNC: 6.6 G/DL (ref 13–16)
HISTORY CHECK: NORMAL
IMM GRANULOCYTES # BLD AUTO: 0.1 K/UL (ref 0–0.04)
IMM GRANULOCYTES NFR BLD AUTO: 1 % (ref 0–0.5)
LYMPHOCYTES # BLD: 1.1 K/UL (ref 0.9–3.6)
LYMPHOCYTES NFR BLD: 10 % (ref 21–52)
MAGNESIUM SERPL-MCNC: 2.2 MG/DL (ref 1.6–2.6)
MCH RBC QN AUTO: 25.8 PG (ref 24–34)
MCHC RBC AUTO-ENTMCNC: 30.8 G/DL (ref 31–37)
MCV RBC AUTO: 83.6 FL (ref 78–100)
MONOCYTES # BLD: 1.2 K/UL (ref 0.05–1.2)
MONOCYTES NFR BLD: 11 % (ref 3–10)
NEUTS SEG # BLD: 8.3 K/UL (ref 1.8–8)
NEUTS SEG NFR BLD: 76 % (ref 40–73)
NRBC # BLD: 0 K/UL (ref 0–0.01)
NRBC BLD-RTO: 0 PER 100 WBC
PLATELET # BLD AUTO: 272 K/UL (ref 135–420)
PMV BLD AUTO: 10.3 FL (ref 9.2–11.8)
POTASSIUM SERPL-SCNC: 3.4 MMOL/L (ref 3.5–5.5)
RBC # BLD AUTO: 2.56 M/UL (ref 4.35–5.65)
SODIUM SERPL-SCNC: 142 MMOL/L (ref 136–145)
WBC # BLD AUTO: 10.9 K/UL (ref 4.6–13.2)

## 2024-03-19 PROCEDURE — 94761 N-INVAS EAR/PLS OXIMETRY MLT: CPT

## 2024-03-19 PROCEDURE — 85025 COMPLETE CBC W/AUTO DIFF WBC: CPT

## 2024-03-19 PROCEDURE — 99232 SBSQ HOSP IP/OBS MODERATE 35: CPT | Performed by: HOSPITALIST

## 2024-03-19 PROCEDURE — 83735 ASSAY OF MAGNESIUM: CPT

## 2024-03-19 PROCEDURE — 6360000002 HC RX W HCPCS: Performed by: INTERNAL MEDICINE

## 2024-03-19 PROCEDURE — 2700000000 HC OXYGEN THERAPY PER DAY

## 2024-03-19 PROCEDURE — 82962 GLUCOSE BLOOD TEST: CPT

## 2024-03-19 PROCEDURE — C9113 INJ PANTOPRAZOLE SODIUM, VIA: HCPCS

## 2024-03-19 PROCEDURE — 6370000000 HC RX 637 (ALT 250 FOR IP): Performed by: HOSPITALIST

## 2024-03-19 PROCEDURE — 36430 TRANSFUSION BLD/BLD COMPNT: CPT

## 2024-03-19 PROCEDURE — 6370000000 HC RX 637 (ALT 250 FOR IP)

## 2024-03-19 PROCEDURE — 94640 AIRWAY INHALATION TREATMENT: CPT

## 2024-03-19 PROCEDURE — 6360000002 HC RX W HCPCS

## 2024-03-19 PROCEDURE — 6360000002 HC RX W HCPCS: Performed by: HOSPITALIST

## 2024-03-19 PROCEDURE — 80048 BASIC METABOLIC PNL TOTAL CA: CPT

## 2024-03-19 PROCEDURE — 36415 COLL VENOUS BLD VENIPUNCTURE: CPT

## 2024-03-19 PROCEDURE — A4216 STERILE WATER/SALINE, 10 ML: HCPCS

## 2024-03-19 PROCEDURE — 1100000003 HC PRIVATE W/ TELEMETRY

## 2024-03-19 PROCEDURE — P9016 RBC LEUKOCYTES REDUCED: HCPCS

## 2024-03-19 PROCEDURE — 2580000003 HC RX 258

## 2024-03-19 RX ORDER — IPRATROPIUM BROMIDE AND ALBUTEROL SULFATE 2.5; .5 MG/3ML; MG/3ML
1 SOLUTION RESPIRATORY (INHALATION) PRN
Status: DISCONTINUED | OUTPATIENT
Start: 2024-03-19 | End: 2024-03-19

## 2024-03-19 RX ORDER — IPRATROPIUM BROMIDE AND ALBUTEROL SULFATE 2.5; .5 MG/3ML; MG/3ML
1 SOLUTION RESPIRATORY (INHALATION) EVERY 4 HOURS PRN
Status: DISCONTINUED | OUTPATIENT
Start: 2024-03-19 | End: 2024-03-21 | Stop reason: HOSPADM

## 2024-03-19 RX ORDER — IPRATROPIUM BROMIDE AND ALBUTEROL SULFATE 2.5; .5 MG/3ML; MG/3ML
1 SOLUTION RESPIRATORY (INHALATION) ONCE AS NEEDED
Status: DISCONTINUED | OUTPATIENT
Start: 2024-03-19 | End: 2024-03-19

## 2024-03-19 RX ORDER — SODIUM CHLORIDE 9 MG/ML
INJECTION, SOLUTION INTRAVENOUS PRN
Status: DISCONTINUED | OUTPATIENT
Start: 2024-03-19 | End: 2024-03-21 | Stop reason: HOSPADM

## 2024-03-19 RX ADMIN — IPRATROPIUM BROMIDE AND ALBUTEROL SULFATE 1 DOSE: .5; 3 SOLUTION RESPIRATORY (INHALATION) at 18:00

## 2024-03-19 RX ADMIN — FINASTERIDE 5 MG: 5 TABLET, FILM COATED ORAL at 08:55

## 2024-03-19 RX ADMIN — FUROSEMIDE 80 MG: 10 INJECTION, SOLUTION INTRAMUSCULAR; INTRAVENOUS at 08:44

## 2024-03-19 RX ADMIN — BUDESONIDE 1 MG: 1 SUSPENSION RESPIRATORY (INHALATION) at 08:50

## 2024-03-19 RX ADMIN — POTASSIUM CHLORIDE 40 MEQ: 1500 TABLET, EXTENDED RELEASE ORAL at 20:47

## 2024-03-19 RX ADMIN — SODIUM CHLORIDE, PRESERVATIVE FREE 10 ML: 5 INJECTION INTRAVENOUS at 08:43

## 2024-03-19 RX ADMIN — IRON SUCROSE 100 MG: 20 INJECTION, SOLUTION INTRAVENOUS at 08:45

## 2024-03-19 RX ADMIN — HYDRALAZINE HYDROCHLORIDE 100 MG: 50 TABLET, FILM COATED ORAL at 08:42

## 2024-03-19 RX ADMIN — AMLODIPINE BESYLATE 10 MG: 10 TABLET ORAL at 08:42

## 2024-03-19 RX ADMIN — HYDRALAZINE HYDROCHLORIDE 100 MG: 50 TABLET, FILM COATED ORAL at 16:46

## 2024-03-19 RX ADMIN — SODIUM CHLORIDE, PRESERVATIVE FREE 40 MG: 5 INJECTION INTRAVENOUS at 06:32

## 2024-03-19 RX ADMIN — IPRATROPIUM BROMIDE AND ALBUTEROL SULFATE 1 DOSE: .5; 3 SOLUTION RESPIRATORY (INHALATION) at 20:08

## 2024-03-19 RX ADMIN — SERTRALINE 50 MG: 50 TABLET, FILM COATED ORAL at 08:42

## 2024-03-19 RX ADMIN — SODIUM CHLORIDE, PRESERVATIVE FREE 10 ML: 5 INJECTION INTRAVENOUS at 20:48

## 2024-03-19 RX ADMIN — ARFORMOTEROL TARTRATE 15 MCG: 15 SOLUTION RESPIRATORY (INHALATION) at 08:50

## 2024-03-19 RX ADMIN — ATORVASTATIN CALCIUM 40 MG: 40 TABLET ORAL at 08:42

## 2024-03-19 RX ADMIN — POTASSIUM CHLORIDE 40 MEQ: 1500 TABLET, EXTENDED RELEASE ORAL at 08:42

## 2024-03-19 RX ADMIN — SODIUM CHLORIDE, PRESERVATIVE FREE 40 MG: 5 INJECTION INTRAVENOUS at 16:57

## 2024-03-19 RX ADMIN — IPRATROPIUM BROMIDE AND ALBUTEROL SULFATE 1 DOSE: .5; 3 SOLUTION RESPIRATORY (INHALATION) at 08:50

## 2024-03-19 NOTE — PROGRESS NOTES
[facility]  Progress Note    Patient: Amish Shetyt Age: 68 y.o. : 1955 MR#: [id number] SSN: [id number]  Date/Time: 3/19/2024     Subjective:     Patient seen evaluated, lying in bed, no acute distress.    68-year-old male with a past medical history of stroke, gout, CKD, hypertension, obesity, sleep apnea, type 2 diabetes presented to the emergency room with complaints of progressive shortness of breath over 2 weeks with mild exertion.  Patient denies any associated chest pain.  ER evaluation patient noted to have a hemoglobin of 5.8, transfused 1 unit PRBC.  GI consulted, patient admitted to the hospital for further evaluation.    3/19 -patient seen and evaluated, lying in bed, no acute distress.  Hemoglobin 6.6, will transfuse 1 unit PRBC.  Continue to monitor hemoglobin.  Once stable anticipate discharge home to follow-up with GI, oncology as outpatient.  Will continue to follow.      Assessment/Plan:     Anemia with a hemoglobin of 5.8, status posttransfusion 1 unit PRBC, continue to monitor H&H.  Appreciate GI input, conservative management, continue to monitor H&H, IV Protonix twice daily.  Iron deficiency-continue IV Venofer x 4 doses.  H/o hypertension-resume home medications, continue to monitor blood pressure.  CKD with proteinuria status post kidney biopsy which shows amyloidosis-appreciate nephrology input, continue Lasix 80 mg daily.  Monitor strict I's and O's.  Hypokalemia-potassium repletion  DVT prophylaxis-SCDs  Full code                Mike Webster MD  24      Case discussed with:  [x]Patient  []Family  []Nursing  []Case Management  DVT Prophylaxis:  []Lovenox  []Hep SQ  [x]SCDs  []Coumadin   []On Heparin gtt    Objective:   VS:   Vitals:    24 1302   BP: (!) 164/73   Pulse: 63   Resp: 18   Temp: 98.2 °F (36.8 °C)   SpO2: 94%        Intake/Output Summary (Last 24 hours) at 3/19/2024 1309  Last data filed at 3/19/2024 0622  Gross

## 2024-03-19 NOTE — PROGRESS NOTES
Patient taken off 02, saturating ok on RA, denies SOB, 93% on room air.  Will ct monitor 02 level.

## 2024-03-19 NOTE — PROGRESS NOTES
Progress  Note    68-year-old male with past medical history of diabetes, amyloidosis, anemia, CKD admitted for severe anemia, following for renal failure  Subjective      Overnight event noted  Breathing is improving.   Hb at 6.6gm/dl,       IMPRESSION:   Chronic kidney disease with proteinuria, progressive CKD, status post kidney biopsy which shows ALECT2 amyloidosis, history of diabetes baseline creatinine around 2.8 to 3 mg per DL, progressive decline in kidney function over the last 6 months  Proteinuria, heavy proteinuria, getting worse  Hypertension, history of uncontrolled hypertension  Anemia, severe anemia on this admission, follows with oncology clinic outpatient has been receiving Epogen and iron infusion.  Congestive heart failure  Diabetes  History of stroke   PLAN:   His renal function is stable, continue [id number] [id number] daily for now.   Continue potassium repletion.  Monitor Hb, agree with ongoing blood transfusion.   Adjust medication for renal function status.             Current Facility-Administered Medications   Medication Dose Route Frequency    0.9 % sodium chloride infusion   IntraVENous PRN    0.9 % sodium chloride infusion   IntraVENous PRN    potassium chloride (KLOR-CON M) extended release tablet 40 mEq  40 mEq Oral BID    furosemide (LASIX) injection 80 mg  80 mg IntraVENous Daily    0.9 % sodium chloride infusion   IntraVENous PRN    sodium chloride flush 0.9 % injection 5-40 mL  5-40 mL IntraVENous 2 times per day    sodium chloride flush 0.9 % injection 5-40 mL  5-40 mL IntraVENous PRN    ondansetron (ZOFRAN-ODT) disintegrating tablet 4 mg  4 mg Oral Q8H PRN    Or    ondansetron (ZOFRAN) injection 4 mg  4 mg IntraVENous Q6H PRN    polyethylene glycol (GLYCOLAX) packet 17 g  17 g Oral Daily PRN    acetaminophen (TYLENOL) tablet 650 mg  650 mg Oral Q6H PRN    Or    acetaminophen (TYLENOL) suppository 650 mg  650 mg Rectal Q6H PRN    pantoprazole (PROTONIX) 40 mg in sodium chloride  (PF) 0.9 % 10 mL injection  40 mg IntraVENous Q12H    insulin lispro (HUMALOG) injection vial 0-8 Units  0-8 Units SubCUTAneous 4x Daily AC & HS    glucose chewable tablet 16 g  4 tablet Oral PRN    dextrose bolus 10% 125 mL  125 mL IntraVENous PRN    Or    dextrose bolus 10% 250 mL  250 mL IntraVENous PRN    glucagon (rDNA) injection 1 mg  1 mg SubCUTAneous PRN    dextrose 10 % infusion   IntraVENous Continuous PRN    amLODIPine (NORVASC) tablet 10 mg  10 mg Oral Daily    atorvastatin (LIPITOR) tablet 40 mg  40 mg Oral Daily    finasteride (PROSCAR) tablet 5 mg  5 mg Oral Daily    hydrALAZINE (APRESOLINE) tablet 100 mg  100 mg Oral BID    sertraline (ZOLOFT) tablet 50 mg  50 mg Oral Daily    iron sucrose (VENOFER) injection 100 mg  100 mg IntraVENous Q24H       Review of Systems:      Complete 10-point review of systems were obtained and discussed in length  with the patient. Complete review of systems was negative/unremarkable  except as mentioned in HPI section.  Data Review:    Labs: Results:       Chemistry Recent Labs     03/17/24  1009 03/18/24  0102 03/18/24  0930 03/18/24  1721 03/19/24  0255   * 149* 143  --  142   K 3.1* 2.8* 2.7* 3.4* 3.4*   * 112* 107  --  108   CO2 27 27 26  --  26   BUN 54* 53* 50*  --  49*   GLOB 4.0  --   --   --   --       CBC w/Diff Recent Labs     03/17/24  1009 03/18/24  0102 03/18/24  0930 03/18/24  1721 03/19/24  0255   WBC 11.4 11.3  --   --  10.9   RBC 2.37* 2.34*  --   --  2.56*   HGB 5.8* 5.9* 7.1* 7.1* 6.6*   HCT 19.9* 19.9* 23.1* 23.4* 21.4*    283  --   --  272      Coagulation No results for input(s): \"INR\", \"APTT\" in the last 72 hours.    Invalid input(s): \"PTP\"    Iron/Ferritin Recent Labs     03/17/24  1009   IRON 22*      BNP Invalid input(s): \"BNPP\"   Cardiac Enzymes No results for input(s): \"CPK\", \"ESSIE\" in the last 72 hours.    Invalid input(s): \"CKRMB\", \"CKND1\", \"TROIP\"   Liver Enzymes No results for input(s): \"TP\", \"ALB\" in the last 72

## 2024-03-19 NOTE — PROGRESS NOTES
[email or web address]  [phone]    Gastroenterology follow up-Progress note    Impression:  1. Acute on chronic ADELINE   - Hgb 5.8 upon admission w/ baseline in 7-9 range. FOB positive. 3/17 NM scan neg. No overt GI bleeding, abd pain, or n/v.   - Followed by [facility] as [facility] w/ dx of ADELINE and anemia of chronic disease; received IV iron last 6/2023 but should be on PO iron now, also on q8wk darbepoeitin per 12/2023 [facility] note.   - GI w/u of chronic ADELINE includes EGD-[facility] in 2019 w/o significant findings. Pill cam study in 2021 revealed possible old blood in small bowel and possible ulcerations in colon prompting repeat colo in 2021 which revealed only TA polyps w/ 5yr recall   - s/p 2 PRBC and 1 IV Iron,   - 3/19: Hgb decreased from 7.1 to 6.6, another unit PRBC given today.   2. CHF  3. A fib - not on anticoagulation  4. CKD   5. HTN  6. CVA (2019)    Plan:  1. Repeat H/H s/p transfusion  2. Monitor H/H, transfuse for Hgb <7  3. Continue BID PPI  4. Ok for diet in absence of overt bleeding  5. No plan for inpatient scope in absence of overt bleeding and prior scopes as above  6. Consider hematology consult  7. May need another iron infusion    Chief Complaint: acute on chronic anemia      Subjective:  Reports large normal stool w/o evidence of bleeding or melena just prior to visit. Seen during dinner, plate mostly consumed. Denies GI specific complaints.     ROS: Denies any fevers, chills, rash.       General: well developed, well nourished, no acute distress  Eyes: conjunctiva normal, EOM normal  Cardiovascular: heart normal, intact distal pulses, normal rate and regular rhythm  Pulmonary: breath sounds normal and effort normal  Abdominal: appearance normal, bowel sounds normal and soft, non-acute, non-tender     Patient Active Problem List   Diagnosis    Secondary hyperparathyroidism of renal origin (HCC)    Impaired mobility and ADLs    Right hemiparesis (HCC)    Current use of aspirin    Obstructive sleep apnea on CPAP

## 2024-03-19 NOTE — PLAN OF CARE
Problem: Physical Therapy - Adult  Goal: By Discharge: Performs mobility at highest level of function for planned discharge setting.  See evaluation for individualized goals.  Description: Initiated  3/18/24  to be met within 7-10 days.    1.  Patient will move from supine to sit and sit to supine , scoot up and down, and roll side to side in bed with modified independence.    2.  Patient will transfer from bed to chair and chair to bed with modified independence using the least restrictive device.  3.  Patient will perform sit to stand with modified independence.  4.  Patient will ambulate with modified independence for 25 feet with the least restrictive device.   5.  Patient will ascend/descend 1 stairs with no handrail(s) with supervision/set-up.    PLOF: Pt lives with wife in a 1 story home with 1 ESTEVAN. Mod I prior to admission with QC.     3/18/2024 1144 by Marcela Cano, PT  Outcome: Progressing     Problem: Safety - Adult  Goal: Free from fall injury  Outcome: Progressing     Problem: Occupational Therapy - Adult  Goal: By Discharge: Performs self-care activities at highest level of function for planned discharge setting.  See evaluation for individualized goals.  Description: Occupational Therapy Goals:  Initiated 3/18/2024 to be met within 7-10 days.    1.  Patient will perform grooming with supervision/set-up in standing for > 2 min with Good balance.   2.  Patient will perform bathing with supervision/set-up.  3.  Patient will perform lower body dressing with supervision/set-up for seated and standing aspects.  4.  Patient will perform toilet transfers with supervision/set-up.  5.  Patient will perform all aspects of toileting with supervision/set-up.  6.  Patient will participate in upper extremity therapeutic exercise/activities with supervision/set-up for 8 minutes to improve endurance and UB strength needed for ADLs    7.  Patient will utilize energy conservation techniques during functional  activities with verbal cues.    PLOF: Pt lives with spouse, reports being mostly Mod Ind for ADLs and functional mobility with cane. Spouse assists prn.  3/18/2024 1542 by Louis Hickey OTR/L  Outcome: Progressing

## 2024-03-20 LAB
ANION GAP SERPL CALC-SCNC: 9 MMOL/L (ref 3–18)
BASOPHILS # BLD: 0.1 K/UL (ref 0–0.1)
BASOPHILS NFR BLD: 1 % (ref 0–2)
BUN SERPL-MCNC: 47 MG/DL (ref 7–18)
BUN/CREAT SERPL: 14 (ref 12–20)
CALCIUM SERPL-MCNC: 8.2 MG/DL (ref 8.5–10.1)
CHLORIDE SERPL-SCNC: 108 MMOL/L (ref 100–111)
CO2 SERPL-SCNC: 25 MMOL/L (ref 21–32)
CREAT SERPL-MCNC: 3.48 MG/DL (ref 0.6–1.3)
DIFFERENTIAL METHOD BLD: ABNORMAL
EOSINOPHIL # BLD: 0.3 K/UL (ref 0–0.4)
EOSINOPHIL NFR BLD: 3 % (ref 0–5)
ERYTHROCYTE [DISTWIDTH] IN BLOOD BY AUTOMATED COUNT: 15.9 % (ref 11.6–14.5)
GLUCOSE BLD STRIP.AUTO-MCNC: 109 MG/DL (ref 70–110)
GLUCOSE BLD STRIP.AUTO-MCNC: 127 MG/DL (ref 70–110)
GLUCOSE BLD STRIP.AUTO-MCNC: 127 MG/DL (ref 70–110)
GLUCOSE BLD STRIP.AUTO-MCNC: 142 MG/DL (ref 70–110)
GLUCOSE SERPL-MCNC: 86 MG/DL (ref 74–99)
HCT VFR BLD AUTO: 23.1 % (ref 36–48)
HGB BLD-MCNC: 7.1 G/DL (ref 13–16)
HISTORY CHECK: NORMAL
IMM GRANULOCYTES # BLD AUTO: 0.1 K/UL (ref 0–0.04)
IMM GRANULOCYTES NFR BLD AUTO: 1 % (ref 0–0.5)
LYMPHOCYTES # BLD: 1 K/UL (ref 0.9–3.6)
LYMPHOCYTES NFR BLD: 9 % (ref 21–52)
MCH RBC QN AUTO: 26.2 PG (ref 24–34)
MCHC RBC AUTO-ENTMCNC: 30.7 G/DL (ref 31–37)
MCV RBC AUTO: 85.2 FL (ref 78–100)
MONOCYTES # BLD: 1.2 K/UL (ref 0.05–1.2)
MONOCYTES NFR BLD: 11 % (ref 3–10)
NEUTS SEG # BLD: 8.3 K/UL (ref 1.8–8)
NEUTS SEG NFR BLD: 76 % (ref 40–73)
NRBC # BLD: 0 K/UL (ref 0–0.01)
NRBC BLD-RTO: 0 PER 100 WBC
PLATELET # BLD AUTO: 273 K/UL (ref 135–420)
PMV BLD AUTO: 10.3 FL (ref 9.2–11.8)
POTASSIUM SERPL-SCNC: 3.8 MMOL/L (ref 3.5–5.5)
RBC # BLD AUTO: 2.71 M/UL (ref 4.35–5.65)
SODIUM SERPL-SCNC: 142 MMOL/L (ref 136–145)
WBC # BLD AUTO: 10.9 K/UL (ref 4.6–13.2)

## 2024-03-20 PROCEDURE — 36415 COLL VENOUS BLD VENIPUNCTURE: CPT

## 2024-03-20 PROCEDURE — 6360000002 HC RX W HCPCS: Performed by: HOSPITALIST

## 2024-03-20 PROCEDURE — 80048 BASIC METABOLIC PNL TOTAL CA: CPT

## 2024-03-20 PROCEDURE — C9113 INJ PANTOPRAZOLE SODIUM, VIA: HCPCS

## 2024-03-20 PROCEDURE — 85025 COMPLETE CBC W/AUTO DIFF WBC: CPT

## 2024-03-20 PROCEDURE — 1100000003 HC PRIVATE W/ TELEMETRY

## 2024-03-20 PROCEDURE — 2700000000 HC OXYGEN THERAPY PER DAY

## 2024-03-20 PROCEDURE — 94761 N-INVAS EAR/PLS OXIMETRY MLT: CPT

## 2024-03-20 PROCEDURE — 97116 GAIT TRAINING THERAPY: CPT

## 2024-03-20 PROCEDURE — 6360000002 HC RX W HCPCS: Performed by: INTERNAL MEDICINE

## 2024-03-20 PROCEDURE — P9016 RBC LEUKOCYTES REDUCED: HCPCS

## 2024-03-20 PROCEDURE — A4216 STERILE WATER/SALINE, 10 ML: HCPCS

## 2024-03-20 PROCEDURE — 6370000000 HC RX 637 (ALT 250 FOR IP): Performed by: HOSPITALIST

## 2024-03-20 PROCEDURE — 36430 TRANSFUSION BLD/BLD COMPNT: CPT

## 2024-03-20 PROCEDURE — 82962 GLUCOSE BLOOD TEST: CPT

## 2024-03-20 PROCEDURE — 6360000002 HC RX W HCPCS

## 2024-03-20 PROCEDURE — 2580000003 HC RX 258

## 2024-03-20 PROCEDURE — 6370000000 HC RX 637 (ALT 250 FOR IP)

## 2024-03-20 RX ORDER — FUROSEMIDE 10 MG/ML
80 INJECTION INTRAMUSCULAR; INTRAVENOUS ONCE
Status: COMPLETED | OUTPATIENT
Start: 2024-03-20 | End: 2024-03-20

## 2024-03-20 RX ORDER — SODIUM CHLORIDE 9 MG/ML
INJECTION, SOLUTION INTRAVENOUS PRN
Status: DISCONTINUED | OUTPATIENT
Start: 2024-03-20 | End: 2024-03-21 | Stop reason: HOSPADM

## 2024-03-20 RX ADMIN — POTASSIUM CHLORIDE 40 MEQ: 1500 TABLET, EXTENDED RELEASE ORAL at 20:31

## 2024-03-20 RX ADMIN — FINASTERIDE 5 MG: 5 TABLET, FILM COATED ORAL at 08:35

## 2024-03-20 RX ADMIN — FUROSEMIDE 80 MG: 10 INJECTION, SOLUTION INTRAMUSCULAR; INTRAVENOUS at 08:37

## 2024-03-20 RX ADMIN — FUROSEMIDE 80 MG: 10 INJECTION, SOLUTION INTRAMUSCULAR; INTRAVENOUS at 15:32

## 2024-03-20 RX ADMIN — SODIUM CHLORIDE, PRESERVATIVE FREE 10 ML: 5 INJECTION INTRAVENOUS at 20:32

## 2024-03-20 RX ADMIN — HYDRALAZINE HYDROCHLORIDE 100 MG: 50 TABLET, FILM COATED ORAL at 08:35

## 2024-03-20 RX ADMIN — SERTRALINE 50 MG: 50 TABLET, FILM COATED ORAL at 08:35

## 2024-03-20 RX ADMIN — HYDRALAZINE HYDROCHLORIDE 100 MG: 50 TABLET, FILM COATED ORAL at 15:32

## 2024-03-20 RX ADMIN — SODIUM CHLORIDE, PRESERVATIVE FREE 10 ML: 5 INJECTION INTRAVENOUS at 08:38

## 2024-03-20 RX ADMIN — AMLODIPINE BESYLATE 10 MG: 10 TABLET ORAL at 08:35

## 2024-03-20 RX ADMIN — POTASSIUM CHLORIDE 40 MEQ: 1500 TABLET, EXTENDED RELEASE ORAL at 08:35

## 2024-03-20 RX ADMIN — SODIUM CHLORIDE, PRESERVATIVE FREE 40 MG: 5 INJECTION INTRAVENOUS at 06:04

## 2024-03-20 RX ADMIN — ATORVASTATIN CALCIUM 40 MG: 40 TABLET ORAL at 08:35

## 2024-03-20 RX ADMIN — IRON SUCROSE 100 MG: 20 INJECTION, SOLUTION INTRAVENOUS at 08:37

## 2024-03-20 NOTE — CARE COORDINATION
CM ordered Home Oxygen Portable and Concentrator on [facility] with [facility].             Ele Hunter, RN  Case Management [phone]

## 2024-03-20 NOTE — CARE COORDINATION
CM brought patient Portable Oxygen, patient asked if Home Oxygen was from [facility].   CM let patient know that Home Oxygen is from [facility].   Patient said he wants to use [facility], said his home CPAP machine if from [facility].   CM explained that CM can order Home Oxygen from [facility], and patient will need to wait for Home Oxygen Portable and Concentrator to be delivered from [facility] to discharge home, due to we do not hold Portable Oxygen in the hospital from [facility].   Patient is agreeable to wait for discharge tomorrow pending Home Oxygen Portable and Concentrator from [facility].           Ele Hunter RN  Case Management [phone]

## 2024-03-20 NOTE — PROGRESS NOTES
[email or web address]  [phone]    Gastroenterology follow up-Progress note    Impression:  1. Acute on chronic ADELINE   - Hgb 5.8 upon admission w/ baseline in 7-9 range. FOB positive. 3/17 NM scan neg. No overt GI bleeding, abd pain, or n/v.   - Followed by [facility] as OP w/ dx of ADELINE and anemia of chronic disease; received IV iron last 6/2023 but should be on PO iron now, also on q8wk darbepoeitin per 12/2023 [facility] note.   - GI w/u of chronic ADELINE includes EGD-[place] in 2019 w/o significant findings. Pill cam study in 2021 revealed possible old blood in small bowel and possible ulcerations in colon prompting repeat colo in 2021 which revealed only TA polyps w/ 5yr recall   - s/p 2 PRBC and 1 IV Iron,   - 3/19: Hgb decreased from 7.1 to 6.6, another unit PRBC given today.   - 3/20: H/H back up to 7.1, additional unit PRBC being given  2. CHF  3. A fib - not on anticoagulation  4. CKD   5. HTN  6. CVA (2019)    Plan:  1. Monitor H/H, transfuse as indicated  2. Continue BID PPI  3. Follow up w/ GI as outpatient.   4. Continue medical management per primary team    Thank you for this consultation and the opportunity to participate in the care of this patient. Please do not hesitate to call with any questions or concerns, or should event occur that may necessitate additional GI evaluation.     Chief Complaint: acute on chronic anemia      Subjective:  Seen w/ RN at bedside, denies any current GI concerns. PRBC transfusing. Pt questions need for outpatient GI follow up.     ROS: Denies any fevers, chills, rash.       General: well developed, well nourished, no acute distress  Eyes: conjunctiva normal, EOM normal  Cardiovascular: heart normal, intact distal pulses, normal rate and regular rhythm  Pulmonary: breath sounds normal and effort normal  Abdominal: appearance normal, bowel sounds normal and soft, non-acute, non-tender     Patient Active Problem List   Diagnosis    Secondary hyperparathyroidism of renal origin (HCC)     (H) 03/20/2024 01:00 AM     03/20/2024 01:00 AM    MPV 10.3 03/20/2024 01:00 AM    No results found for: \"MONO\", \"BASO\", \"BANDS\", \"METAS\", \"PRO\", \"BLAST\"   Basic Metabolic Profile   Recent Labs     03/19/24  0255 03/20/24  0100    142   K 3.4* 3.8    108   CO2 26 25   BUN 49* 47*   GLUCOSE 98 86   MG 2.2  --         Hepatic Function    No results found for: \"ALB\", \"TP\" No components found for: \"SGOT\", \"GPT\", \"DBILI\", \"TBIL\"       Coags   No results for input(s): \"INR\", \"APTT\" in the last 72 hours.    Invalid input(s): \"PTP\"            NARINDER Mcrae  3/20/2024, 2:04 PM   [facility].  [email or web address]  Office: [phone]  [facility] Cell: [phone] (M-F 7:30-4:30)

## 2024-03-20 NOTE — PLAN OF CARE
Problem: Physical Therapy - Adult  Goal: By Discharge: Performs mobility at highest level of function for planned discharge setting.  See evaluation for individualized goals.  Description: Initiated  3/18/24  to be met within 7-10 days.    1.  Patient will move from supine to sit and sit to supine , scoot up and down, and roll side to side in bed with modified independence.    2.  Patient will transfer from bed to chair and chair to bed with modified independence using the least restrictive device.  3.  Patient will perform sit to stand with modified independence.  4.  Patient will ambulate with modified independence for 25 feet with the least restrictive device.   5.  Patient will ascend/descend 1 stairs with no handrail(s) with supervision/set-up.    PLOF: Pt lives with wife in a 1 story home with 1 ESTEVAN. Mod I prior to admission with QC.     Outcome: Progressing   PHYSICAL THERAPY TREATMENT    Patient: Amish Shetty (68 y.o. male)  Date: 3/20/2024  Diagnosis: Edema [R60.9]  Hypervolemia, unspecified hypervolemia type [E87.70]  Iron deficiency anemia, unspecified iron deficiency anemia type [D50.9]  Acute on chronic anemia [D64.9]  Acute renal failure superimposed on stage 3b chronic kidney disease, unspecified acute renal failure type (HCC) [N17.9, N18.32] Acute on chronic anemia      Precautions: Fall Risk,  ,  ,  ,  ,  ,  ,    ASSESSMENT:  Received sitting up in bed and agreeable to PT upon arrival. On room air, denies SOB at rest. Endorses he does become SOB with mobility. SpO2 monitored t/o session (see below). Noted R UE>LE flaccidity 2/2 prior CVA in 2019.  Supine > sit EOB SBA. Sit > stand to quad cane requires anterior <> posterior weight shift to gain momentum to achieve task. Demos good static standing balance. Ambulates 15 ft with quad cane and CGA requiring a seated rest break 2/2 increased SOB. Educated on energy conservation including PLB. Pt provided with 2 L O2 via NC prior to further mobility.  Requires mod-assist to complete sit > stand from chair. Ambulates back to EOB (15 ft) with improved endurance. Declines sitting up in recliner. Returned to supine SBA. Scoots HOB min-assit. Left with HOB elevated >35 degrees and in NAD following mobility. Nursing notified of patients status. Educated on need for RN assistance with mobility; verbalized understanding. Call bell in reach.     Prior to mobility: /70, SpO2 93%  Following first trial of ambulation: SpO2 87-94%  Following second trial of ambulation with 2 L O2: 91-95%  Following mobility session with 2 L O2: SpO2 94%    Progression toward goals:   [x]      Improving appropriately and progressing toward goals  []      Improving slowly and progressing toward goals  []      Not making progress toward goals and plan of care will be adjusted     PLAN:  Patient continues to benefit from skilled intervention to address the above impairments.  Continue treatment per established plan of care.    Further Equipment Recommendations for Discharge: rolling walker    AM-PAC Inpatient Mobility Raw Score : 17    This [facility] score should be considered in conjunction with interdisciplinary team recommendations to determine the most appropriate discharge setting. Patient's social support, diagnosis, medical stability, and prior level of function should also be taken into consideration.     Current research shows that an AM-PAC score of 17 (13 without stairs) or less is not associated with a discharge to the patient's home setting. Based on an AM-PAC score and their current functional mobility deficits, it is recommended that the patient have 3-5 sessions per week of Physical Therapy at d/c to increase the patient's independence.       SUBJECTIVE:   Patient stated, \"It feels better when I lean forward.\"    OBJECTIVE DATA SUMMARY:   Critical Behavior:  Orientation  Overall Orientation Status: Within Normal Limits  Cognition  Overall Cognitive Status: WNL    Functional

## 2024-03-20 NOTE — PROGRESS NOTES
Bedside and Verbal shift change report given to FRANSISCA Gloria (oncoming nurse) by FRANSISCA Sher (offgoing nurse). Report included the following information Nurse Handoff Report, Index, ED Encounter Summary, ED SBAR, Adult Overview, Intake/Output, MAR, and Recent Results. Endorse blood sugar to on-coming nurse d/t pt care.

## 2024-03-20 NOTE — CARE COORDINATION
Discharge order noted for today. Pt has been accepted to [facility]. Met with patient and is  agreeable to the transition plan today. Transport has been arranged through patient's wife. Patient's home health  orders have been forwarded to [facility] via Epic.  Discharge information has been documented on the AVS.           Ele Hunter RN  Case Management [phone]

## 2024-03-20 NOTE — CARE COORDINATION
CM reviewed [facility], they do not carry Oxygen products.       CM spoke with patent at the bedside and updated that [facility] does not carry Oxygen products.   Patient is agreeable to use [facility].   CM gave patient the Home Portable Oxygen, and patient signed to Proof of Delivery form for the Home Portable Oxygen for [facility].       CM faxed the Proof of Delivery form with patient's facesheet to [facility] to [phone].      Dr Webster signed the Home Oxygen order for patient.       CM messaged [facility] in [facility], and updated that CM delivered Home Portable Oxygen to patient, and that patient will need Home Oxygen Concentrator delivered to patient's home for patient to discharge.           Ele Hunter RN  Case Management [phone]

## 2024-03-20 NOTE — PROGRESS NOTES
Oxygen requirement assessed during amb trial with PT. See vitals below. Would benefit from supplemental oxygen with mobility.     1.) Resting on room air 93% (90 or above)  2.) Ambulating on room air 87% (88% or below)  3.) Ambulating on 2 liters of oxygen 94% (91 or above)

## 2024-03-20 NOTE — PROGRESS NOTES
Patient seen and evaluated, sitting up in bed, no acute distress.  Hemoglobin 7.1, will transfuse 1 unit PRBC prior to discharge.  Nurses also mentioned that patient is short of breath.  Patient is chronic kidney disease stage IIIb/IV.  Nephrology on board.  Patient is currently on Lasix 80 mg IV daily.  Patient has received 3 units PRBC transfusion during his hospital stay.  Will give an additional dose of Lasix and reevaluate patient for possible home oxygen.  Patient verbalized understanding.  Anticipate discharge home later today versus tomorrow depending on oxygen requirement.

## 2024-03-20 NOTE — PROGRESS NOTES
Placed call to respiratory d/t pt having increased shortness of breath while resting in bed. O2 placed on pt for comfort, sats at 93% room air.

## 2024-03-20 NOTE — PROGRESS NOTES
Progress  Note    68-year-old male with past medical history of diabetes, amyloidosis, anemia, CKD admitted for severe anemia, following for renal failure  Subjective      Overnight event noted  Hypoxia noted, now on oxygen supplement   Plan for one unit blood transfusion noted    IMPRESSION:   Chronic kidney disease with proteinuria, progressive CKD, status post kidney biopsy which shows ALECT2 amyloidosis, history of diabetes baseline creatinine around 2.8 to 3 mg per DL, progressive decline in kidney function over the last 6 months  Proteinuria, heavy proteinuria, getting worse  Hypertension, history of uncontrolled hypertension  Anemia, severe anemia on this admission, follows with oncology clinic outpatient has been receiving Epogen and iron infusion.  Congestive heart failure  Diabetes  History of stroke   PLAN:   Plan to switch him bumex 1mg bid on discharge.   Please give additional lasix 80mg after blood transfusion.   From renal stand point, okay to discharge. Please switch him to oral bumex on discharge. He has upcoming appointment to renal clinic in 2 weeks.  He is approaching close to start dialysis, will arrange fistula placement outpatient.   Adjust medication for renal function status.             Current Facility-Administered Medications   Medication Dose Route Frequency    0.9 % sodium chloride infusion   IntraVENous PRN    0.9 % sodium chloride infusion   IntraVENous PRN    ipratropium 0.5 mg-albuterol 2.5 mg (DUONEB) nebulizer solution 1 Dose  1 Dose Inhalation Q4H PRN    0.9 % sodium chloride infusion   IntraVENous PRN    potassium chloride (KLOR-CON M) extended release tablet 40 mEq  40 mEq Oral BID    furosemide (LASIX) injection 80 mg  80 mg IntraVENous Daily    0.9 % sodium chloride infusion   IntraVENous PRN    sodium chloride flush 0.9 % injection 5-40 mL  5-40 mL IntraVENous 2 times per day    sodium chloride flush 0.9 % injection 5-40 mL  5-40 mL IntraVENous PRN    ondansetron  input(s): \"INR\", \"APTT\" in the last 72 hours.    Invalid input(s): \"PTP\"    Iron/Ferritin No results for input(s): \"IRON\" in the last 72 hours.    Invalid input(s): \"TIBCCALC\"     BNP Invalid input(s): \"BNPP\"   Cardiac Enzymes No results for input(s): \"CPK\", \"ESSIE\" in the last 72 hours.    Invalid input(s): \"CKRMB\", \"CKND1\", \"TROIP\"   Liver Enzymes No results for input(s): \"TP\", \"ALB\" in the last 72 hours.    Invalid input(s): \"TBIL\", \"AP\", \"SGOT\", \"GPT\", \"DBIL\"   Thyroid Studies Lab Results   Component Value Date/Time    TSH 4.63 08/19/2019 06:55 AM         EKG: atrial fibrillation     Physical Assessment:   Visit Vitals  BP (!) 156/73   Pulse 54   Temp 97.5 °F (36.4 °C) (Oral)   Resp 16   Ht 1.753 m (5' 9\")   Wt 105.5 kg (232 lb 9.4 oz)   SpO2 96%   BMI 34.35 kg/m²     Weight change:     Intake/Output Summary (Last 24 hours) at 3/20/2024 1200  Last data filed at 3/20/2024 0617  Gross per 24 hour   Intake 240 ml   Output 651 ml   Net -411 ml     Physical Exam:   General: comfortable, no acute distress   HEENT sclera anicteric, supple neck, no thyromegaly  CVS: S1S2 heard,  no rub  RS: + air entry b/l,   Abd: Soft, Non tender,   Neuro: non focal, awake, alert ,   Extrm: ++edema, no cyanosis, clubbing   Skin: no visible  Rash  Musculoskeletal: No gross joints or bone deformities     Procedures/imaging: see electronic medical records for all procedures, Xrays and details which were not copied into this note but were reviewed prior to creation of Plan            Papito Ramírez MD  March 20, 2024  [facility]  Office [phone]

## 2024-03-20 NOTE — HOME CARE
3/21/24:  Updated referral.  Scheduling made aware.        3/20/24:  Active patient with [facility].  Resumption of care orders received for SN / PT / OT.   Patient and spouse states, has a new PCP.  Unable to enter in system.  Did contact [employer] / Finance Coordinator for assistance.    Orders noted and referral updated and sent to central intake.    Supervising MD for Beth Cheema NP-C is Caleb Hurst MD for all HH signatures at this time.  Hospital follow up appt with Beth Cheema NP on 4/8/24 at 10:30am.          -   WANG Keen [facility] Home Care Liaison

## 2024-03-21 VITALS
SYSTOLIC BLOOD PRESSURE: 175 MMHG | RESPIRATION RATE: 20 BRPM | HEART RATE: 56 BPM | OXYGEN SATURATION: 95 % | WEIGHT: 231.5 LBS | HEIGHT: 69 IN | TEMPERATURE: 97.9 F | DIASTOLIC BLOOD PRESSURE: 81 MMHG | BODY MASS INDEX: 34.29 KG/M2

## 2024-03-21 PROBLEM — Z71.89 GOALS OF CARE, COUNSELING/DISCUSSION: Status: ACTIVE | Noted: 2024-03-21

## 2024-03-21 PROBLEM — Z51.5 PALLIATIVE CARE ENCOUNTER: Status: ACTIVE | Noted: 2024-03-21

## 2024-03-21 PROBLEM — N18.32 ACUTE RENAL FAILURE SUPERIMPOSED ON STAGE 3B CHRONIC KIDNEY DISEASE (HCC): Status: ACTIVE | Noted: 2024-01-30

## 2024-03-21 PROBLEM — Z71.89 ACP (ADVANCE CARE PLANNING): Status: ACTIVE | Noted: 2024-03-21

## 2024-03-21 PROBLEM — R60.9 EDEMA: Status: ACTIVE | Noted: 2024-02-04

## 2024-03-21 PROBLEM — Z71.89 DNR (DO NOT RESUSCITATE) DISCUSSION: Status: ACTIVE | Noted: 2024-03-21

## 2024-03-21 LAB
ABO + RH BLD: NORMAL
ANION GAP SERPL CALC-SCNC: 7 MMOL/L (ref 3–18)
BLD PROD TYP BPU: NORMAL
BLOOD BANK BLOOD PRODUCT EXPIRATION DATE: NORMAL
BLOOD BANK DISPENSE STATUS: NORMAL
BLOOD BANK ISBT PRODUCT BLOOD TYPE: 6200
BLOOD BANK ISBT PRODUCT BLOOD TYPE: 6200
BLOOD BANK ISBT PRODUCT BLOOD TYPE: 7300
BLOOD BANK ISBT PRODUCT BLOOD TYPE: 7300
BLOOD BANK PRODUCT CODE: NORMAL
BLOOD BANK UNIT TYPE AND RH: NORMAL
BLOOD GROUP ANTIBODIES SERPL: NORMAL
BPU ID: NORMAL
BUN SERPL-MCNC: 47 MG/DL (ref 7–18)
BUN/CREAT SERPL: 14 (ref 12–20)
CALCIUM SERPL-MCNC: 8.5 MG/DL (ref 8.5–10.1)
CALLED TO: NORMAL
CALLED TO:,BCALL3: NORMAL
CALLED TO:: NORMAL
CHLORIDE SERPL-SCNC: 107 MMOL/L (ref 100–111)
CO2 SERPL-SCNC: 29 MMOL/L (ref 21–32)
CREAT SERPL-MCNC: 3.37 MG/DL (ref 0.6–1.3)
CROSSMATCH RESULT: NORMAL
ERYTHROCYTE [DISTWIDTH] IN BLOOD BY AUTOMATED COUNT: 16.9 % (ref 11.6–14.5)
GLUCOSE BLD STRIP.AUTO-MCNC: 101 MG/DL (ref 70–110)
GLUCOSE BLD STRIP.AUTO-MCNC: 90 MG/DL (ref 70–110)
GLUCOSE SERPL-MCNC: 98 MG/DL (ref 74–99)
HCT VFR BLD AUTO: 30.4 % (ref 36–48)
HGB BLD-MCNC: 9.2 G/DL (ref 13–16)
MCH RBC QN AUTO: 26.4 PG (ref 24–34)
MCHC RBC AUTO-ENTMCNC: 30.3 G/DL (ref 31–37)
MCV RBC AUTO: 87.4 FL (ref 78–100)
NRBC # BLD: 0 K/UL (ref 0–0.01)
NRBC BLD-RTO: 0 PER 100 WBC
PLATELET # BLD AUTO: 294 K/UL (ref 135–420)
PMV BLD AUTO: 10.5 FL (ref 9.2–11.8)
POTASSIUM SERPL-SCNC: 4 MMOL/L (ref 3.5–5.5)
RBC # BLD AUTO: 3.48 M/UL (ref 4.35–5.65)
SODIUM SERPL-SCNC: 143 MMOL/L (ref 136–145)
SPECIMEN EXP DATE BLD: NORMAL
UNIT DIVISION: 0
UNIT ISSUE DATE/TIME: NORMAL
WBC # BLD AUTO: 12.8 K/UL (ref 4.6–13.2)

## 2024-03-21 PROCEDURE — 99223 1ST HOSP IP/OBS HIGH 75: CPT | Performed by: INTERNAL MEDICINE

## 2024-03-21 PROCEDURE — A4216 STERILE WATER/SALINE, 10 ML: HCPCS

## 2024-03-21 PROCEDURE — C9113 INJ PANTOPRAZOLE SODIUM, VIA: HCPCS

## 2024-03-21 PROCEDURE — 6360000002 HC RX W HCPCS: Performed by: HOSPITALIST

## 2024-03-21 PROCEDURE — 80048 BASIC METABOLIC PNL TOTAL CA: CPT

## 2024-03-21 PROCEDURE — 97110 THERAPEUTIC EXERCISES: CPT

## 2024-03-21 PROCEDURE — 94761 N-INVAS EAR/PLS OXIMETRY MLT: CPT

## 2024-03-21 PROCEDURE — 6370000000 HC RX 637 (ALT 250 FOR IP)

## 2024-03-21 PROCEDURE — 99239 HOSP IP/OBS DSCHRG MGMT >30: CPT | Performed by: HOSPITALIST

## 2024-03-21 PROCEDURE — 6360000002 HC RX W HCPCS: Performed by: INTERNAL MEDICINE

## 2024-03-21 PROCEDURE — 82962 GLUCOSE BLOOD TEST: CPT

## 2024-03-21 PROCEDURE — 2700000000 HC OXYGEN THERAPY PER DAY

## 2024-03-21 PROCEDURE — 85027 COMPLETE CBC AUTOMATED: CPT

## 2024-03-21 PROCEDURE — 6370000000 HC RX 637 (ALT 250 FOR IP): Performed by: HOSPITALIST

## 2024-03-21 PROCEDURE — 6360000002 HC RX W HCPCS

## 2024-03-21 PROCEDURE — 2580000003 HC RX 258

## 2024-03-21 PROCEDURE — 36415 COLL VENOUS BLD VENIPUNCTURE: CPT

## 2024-03-21 RX ORDER — FUROSEMIDE 40 MG/1
40 TABLET ORAL 2 TIMES DAILY
Qty: 60 TABLET | Refills: 3 | Status: SHIPPED | OUTPATIENT
Start: 2024-03-21

## 2024-03-21 RX ADMIN — FINASTERIDE 5 MG: 5 TABLET, FILM COATED ORAL at 09:02

## 2024-03-21 RX ADMIN — HYDRALAZINE HYDROCHLORIDE 100 MG: 50 TABLET, FILM COATED ORAL at 09:01

## 2024-03-21 RX ADMIN — POTASSIUM CHLORIDE 40 MEQ: 1500 TABLET, EXTENDED RELEASE ORAL at 09:02

## 2024-03-21 RX ADMIN — AMLODIPINE BESYLATE 10 MG: 10 TABLET ORAL at 09:02

## 2024-03-21 RX ADMIN — SODIUM CHLORIDE, PRESERVATIVE FREE 10 ML: 5 INJECTION INTRAVENOUS at 09:02

## 2024-03-21 RX ADMIN — ATORVASTATIN CALCIUM 40 MG: 40 TABLET ORAL at 09:01

## 2024-03-21 RX ADMIN — SERTRALINE 50 MG: 50 TABLET, FILM COATED ORAL at 09:02

## 2024-03-21 RX ADMIN — SODIUM CHLORIDE, PRESERVATIVE FREE 40 MG: 5 INJECTION INTRAVENOUS at 06:15

## 2024-03-21 RX ADMIN — IRON SUCROSE 100 MG: 20 INJECTION, SOLUTION INTRAVENOUS at 09:02

## 2024-03-21 RX ADMIN — FUROSEMIDE 80 MG: 10 INJECTION, SOLUTION INTRAMUSCULAR; INTRAVENOUS at 08:59

## 2024-03-21 ASSESSMENT — PAIN SCALES - GENERAL: PAINLEVEL_OUTOF10: 0

## 2024-03-21 NOTE — PLAN OF CARE
barreto left within reach  []  Nursing notified  []  Caregiver present  []  Bed alarm activated    COMMUNICATION/EDUCATION:   Patient Education  Education Provided: Equipment;Plan of Care  Education Method: Verbal;Teach Back  Barriers to Learning: None  Education Outcome: Continued education needed    Thank you for this referral.  DESMOND Vital  Minutes: 13

## 2024-03-21 NOTE — PROGRESS NOTES
Bedside and Verbal shift change report given to FRANSISCA Reyes (oncoming nurse) by FRANSISCA Sher (offgoing nurse). Report included the following information Nurse Handoff Report, Index, ED Encounter Summary, ED SBAR, Adult Overview, Intake/Output, MAR, Recent Results, and Med Rec Status.

## 2024-03-21 NOTE — PLAN OF CARE
Problem: Pain  Goal: Verbalizes/displays adequate comfort level or baseline comfort level  Outcome: Progressing  Flowsheets (Taken 3/20/2024 2000 by Nikky Morales RN)  Verbalizes/displays adequate comfort level or baseline comfort level: Encourage patient to monitor pain and request assistance     Problem: Safety - Adult  Goal: Free from fall injury  Outcome: Progressing     Problem: Skin/Tissue Integrity  Goal: Absence of new skin breakdown  Description: 1.  Monitor for areas of redness and/or skin breakdown  2.  Assess vascular access sites hourly  3.  Every 4-6 hours minimum:  Change oxygen saturation probe site  4.  Every 4-6 hours:  If on nasal continuous positive airway pressure, respiratory therapy assess nares and determine need for appliance change or resting period.  Outcome: Progressing

## 2024-03-21 NOTE — PROGRESS NOTES
Progress  Note    68-year-old male with past medical history of diabetes, amyloidosis, anemia, CKD admitted for severe anemia, following for renal failure  Subjective      Overnight event noted  Received blood transfusion,   Plan for discharge noted  Will required home oxygen supplement   He informs me that he has lasix at home    IMPRESSION:   Chronic kidney disease with proteinuria, progressive CKD, status post kidney biopsy which shows ALECT2 amyloidosis, history of diabetes baseline creatinine around 2.8 to 3 mg per DL, progressive decline in kidney function over the last 6 months  Proteinuria, heavy proteinuria, getting worse  Hypertension, history of uncontrolled hypertension  Anemia, severe anemia on this admission, follows with oncology clinic outpatient has been receiving Epogen and iron infusion.  Congestive heart failure  Diabetes  History of stroke   PLAN:   Increase lasix 40mg bid at home,  From renal stand point, okay to discharge. Please switch him to oral bumex on discharge. He has upcoming appointment to renal clinic in 2 weeks.  He is approaching close to start dialysis, will arrange fistula placement outpatient.   Adjust medication for renal function status.     Discussed with Dr. Webster        Current Facility-Administered Medications   Medication Dose Route Frequency    0.9 % sodium chloride infusion   IntraVENous PRN    0.9 % sodium chloride infusion   IntraVENous PRN    ipratropium 0.5 mg-albuterol 2.5 mg (DUONEB) nebulizer solution 1 Dose  1 Dose Inhalation Q4H PRN    0.9 % sodium chloride infusion   IntraVENous PRN    potassium chloride (KLOR-CON M) extended release tablet 40 mEq  40 mEq Oral BID    furosemide (LASIX) injection 80 mg  80 mg IntraVENous Daily    0.9 % sodium chloride infusion   IntraVENous PRN    sodium chloride flush 0.9 % injection 5-40 mL  5-40 mL IntraVENous 2 times per day    sodium chloride flush 0.9 % injection 5-40 mL  5-40 mL IntraVENous PRN    ondansetron

## 2024-03-21 NOTE — CARE COORDINATION
Dr. Webster let CM know that patient's wife said that Home Oxygen Concentrator has been delivered and patient will discharge today.           Ele Hunter RN  Case Management [phone]

## 2024-03-21 NOTE — PROGRESS NOTES
0710- Bedside and Verbal shift change report given to Amy (oncoming nurse) by FRANSISCA Reeder (offgoing nurse). Report included the following information Nurse Handoff Report.     0845- AM meds given, assessment completed  1200- no change in patient condition, no distress noted  1500-discharge instructions given.-As part of the discharge instructions, medications already given today were discussed with the patient. The next dose due of all ordered meds was highlighted as part of the medication discharge instructions. Discussed with the patient the importance of taking medications as directed, as well as the side effects and adverse reactions to medications ordered.   Verbalized understanding. Discharged home via wheelchair with wife.

## 2024-03-21 NOTE — CARE COORDINATION
JOHN spoke with Augustus Barnes with [facility], and updated that patient will be discharging today.             Ele Hunter RN  Case Management [phone]

## 2024-03-21 NOTE — CARE COORDINATION
Discharge order noted for today. Pt has been accepted to [facility]. Met with patient and he is agreeable to the transition plan today. Transport has been arranged through patient's wife. Patient's discharge summary and home health  orders have been forwarded to [facility] via Epic. Discharge information has been documented on the AVS.           Ele Hunter RN  Case Management [phone]

## 2024-03-21 NOTE — CARE COORDINATION
[facility] messagelee CM in [facility], that Home Oxygen Concentrator is expected to be delivered to patient's home sometime today.   ETA is unknown.           Ele Hunter RN  Case Management [phone]

## 2024-03-21 NOTE — DISCHARGE SUMMARY
Hospitalist Discharge Summary      Patient: Amish Shetty MRN: [id number]  CSN: [id number]    YOB: 1955  Age: 68 y.o.  Sex: male    DOA: 3/17/2024 LOS:  LOS: 4 days   Discharge Date:     Admission Diagnoses: Edema [R60.9]  Hypervolemia, unspecified hypervolemia type [E87.70]  Iron deficiency anemia, unspecified iron deficiency anemia type [D50.9]  Acute on chronic anemia [D64.9]  Acute renal failure superimposed on stage 3b chronic kidney disease, unspecified acute renal failure type (HCC) [N17.9, N18.32]    Discharge Diagnoses:    Acute on chronic anemia  Iron deficiency anemia  History of hypertension  History of CKD with proteinuria status post biopsy which shows amyloidosis  History of CVA with right arm weakness    Discharge Condition: Fair    Discharge To: Home    CODE STATUS: DNR       PHYSICAL EXAM  Visit Vitals  BP (!) 175/81   Pulse 56   Temp 97.9 °F (36.6 °C) (Oral)   Resp 20   Ht 1.753 m (5' 9\")   Wt 105 kg (231 lb 8 oz)   SpO2 95%   BMI 34.19 kg/m²       General: Alert, cooperative, no acute distress    Lungs:  Decreased breath sounds in bases bilaterally  Heart:  Regular rate and Rhythm.  Abdomen: Soft, Non distended, Non tender. + Bowel sounds.  Extremities: No edema/ cyanosis/ clubbing  Neurologic:  AA oriented X 3.  Right arm weakness secondary to previous CVA                              HPI:    Amish Shetty is a 68 y.o. male with a PMHx of stroke, gout, CKD, hypertension, obesity, sleep apnea, DMT2 who presented to the ED with complaints of progressive shortness of breath over 2 weeks with mild exertion.  Denies orthopnea.  Denies associated chest pain.  Patient states he is compliant with his medications as prescribed.  States he takes the Lasix twice a day as prescribed.  States no swelling throughout his body is advised, but also not improving since last hospitalization.  Currently upon my evaluation, patient receiving first unit of PRBC.  Tolerating well.  Vital signs stable  pleural effusions. Osseous structures are unchanged.     Vascular congestion and patchiness as above.          Procedures:       [] None       [] Other       [] Cardiac procedures performed during your hospitalization:       01/30/24    ECHO (TTE) COMPLETE (PRN CONTRAST/BUBBLE/STRAIN/3D) 01/31/2024  5:21 PM (Final)    Interpretation Summary    Left Ventricle: Normal left ventricular systolic function with a visually estimated EF of 50 - 55%. Left ventricle is dilated. Mildly increased wall thickness. Septal thickening. Normal wall motion.    Aortic Valve: Moderately calcified cusp. Mild stenosis of the aortic valve. AV mean gradient is 14 mmHg. AV peak gradient is 31 mmHg. AV peak velocity is 2.8 m/s. AV Velocity Ratio is 0.61. LVOT:AV VTI Index is 0.62. LVOT diameter is 2.2 cm. AV area by continuity VTI is 2.4 cm2. AV area by peak velocity is 2.5 cm2. AV Stroke Volume index is 61.7 mL/m2.    Mitral Valve: Mild regurgitation.    Left Atrium: Left atrium is dilated.    Right Atrium: Right atrium is dilated.    Pericardium: Moderate (1-2 cm) pericardial effusion present. No indication of cardiac tamponade.    Image quality is fair.    Signed by: Amish Pereira MD on 1/31/2024  5:21 PM                 Current Discharge Medication List        CONTINUE these medications which have CHANGED    Details   furosemide (LASIX) 40 MG tablet Take 1 tablet by mouth 2 times daily  Qty: 60 tablet, Refills: 3  Start date: 3/21/2024           CONTINUE these medications which have NOT CHANGED    Details   atorvastatin (LIPITOR) 40 MG tablet Take 1 tablet by mouth daily  Qty: 90 tablet, Refills: 3      hydrALAZINE (APRESOLINE) 100 MG tablet Take 1 tablet by mouth in the morning and 1 tablet in the evening.  Qty: 90 tablet, Refills: 3      finasteride (PROSCAR) 5 MG tablet Take 1 tablet by mouth daily  Qty: 90 tablet, Refills: 3      neomycin-bacitracin-polymyxin (NEOSPORIN) 5-400-5000 ointment Apply topically 4 times daily.  Qty: 453.9

## 2024-03-21 NOTE — PALLIATIVE CARE
Advance Care Planning       Advanced Steps Advance Care Planning Conversation  POLST (Goals of Care for Serious Illness and/or Frailty)         Date of conversation: 03/21/24  Location:[facility]    Advanced Steps® ACP Facilitator: Maine Winkler RN    Participants:    [x] Patient     Healthcare Decision Maker:    Primary Decision Maker (Active): Mary Shetty - Spouse - [phone]    Secondary Decision Maker: Avis Esparza - Child - [phone]     Conversation Topics  Visited patient for new consult along with Palliative team member Dr. RICHY Ramírez. Patient sitting up in bed, awake alert and oriented. States his breathing is \"better\". Shortness of breath noted especially with speaking/activity. States he is ready to go home. Was supposed to be discharged yesterday, but, had to wait for oxygen delivery.  Patient is , lives with his wife and sister in law. Has one daughter who lives in the [place] part of the [facility]. Shared that he has \"good neighbors\" that help out when needed.   Patient does not have an AMD on file, is willing to complete one today naming his wife Mary as his primary medical decision maker, and his daughter Avis as the secondary decision maker.   Goals of care discussion regarding the benefits and burdens of intubation and CPR in the event of respiratory decline or cardiopulmonary arrest, as well as the benefits and burdens of artificial nutrition. Patient voiced understanding and stated he would not be accepting of CPR, intubation or feeding tubes. Introduced the completion of Physician Order For Selective Treatments ( POLST), and DDNR, forms reviewed and signed by patient.  Bedside RN and attending made aware of change in code status.  Call placed to patient's wife Mary at [phone] to inform her of our conversations with patient and documents created.    Cardiopulmonary Resuscitation     Order Elected for CPR:  []  Yes CPR [x]  No CPR      When NOT in Cardiopulmonary Arrest, Order Elected:

## 2024-03-21 NOTE — CONSULTS
(5' 9\")   Wt 105 kg (231 lb 8 oz)   SpO2 94%   BMI 34.19 kg/m²     Constitutional: Awake, ill-appearing, nasal cannula in situ, not in acute distress  Eyes: pupils equal, anicteric  ENMT: no nasal discharge, moist mucous membranes  Cardiovascular: S1-S2 heard  Respiratory: Diminished breath sound bibasilar without any wheezes  Gastrointestinal: soft non-tender, nondistended, +bowel sounds  Musculoskeletal: Pitting pedal edema, no deformity, no tenderness to palpation  Skin: warm, dry  Neurologic: following verbal commands, moves left side extremities very well, right-sided hemiplegia noted, no tremors  Psychiatric: No agitation, no hallucinations  Other:       PALLIATIVE DIAGNOSES:   goals of care discussion/Advance care planning   2.  Palliative care encounter and DNR discussion  3.  Acute on chronic iron deficiency anemia s/p blood transfusion  4.  Acute on chronic diastolic heart failure now requiring oxygen  5.  A-fib not on anticoagulation  6.  Multiple comorbidities including CVA, hypertension, CKD etc.    Patient Active Problem List   Diagnosis    Secondary hyperparathyroidism of renal origin (HCC)    Impaired mobility and ADLs    Right hemiparesis (HCC)    Current use of aspirin    Obstructive sleep apnea on CPAP    Obesity, Class I, BMI 30-34.9    Dysarthria    CKD (chronic kidney disease) stage 2, GFR 60-89 ml/min    Hypertensive heart and kidney disease without heart failure and with stage 2 chronic kidney disease    Pure hypercholesterolemia    Hypokalemia    Elevated prostate specific antigen (PSA)    Refusal of statin medication by patient    First 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    Vitamin B12 deficiency anemia    Primary osteoarthritis of right ankle    Type 2  injection 80 mg  80 mg IntraVENous Daily    0.9 % sodium chloride infusion   IntraVENous PRN    sodium chloride flush 0.9 % injection 5-40 mL  5-40 mL IntraVENous 2 times per day    sodium chloride flush 0.9 % injection 5-40 mL  5-40 mL IntraVENous PRN    ondansetron (ZOFRAN-ODT) disintegrating tablet 4 mg  4 mg Oral Q8H PRN    Or    ondansetron (ZOFRAN) injection 4 mg  4 mg IntraVENous Q6H PRN    polyethylene glycol (GLYCOLAX) packet 17 g  17 g Oral Daily PRN    acetaminophen (TYLENOL) tablet 650 mg  650 mg Oral Q6H PRN    Or    acetaminophen (TYLENOL) suppository 650 mg  650 mg Rectal Q6H PRN    pantoprazole (PROTONIX) 40 mg in sodium chloride (PF) 0.9 % 10 mL injection  40 mg IntraVENous Q12H    insulin lispro (HUMALOG) injection vial 0-8 Units  0-8 Units SubCUTAneous 4x Daily AC & HS    glucose chewable tablet 16 g  4 tablet Oral PRN    dextrose bolus 10% 125 mL  125 mL IntraVENous PRN    Or    dextrose bolus 10% 250 mL  250 mL IntraVENous PRN    glucagon (rDNA) injection 1 mg  1 mg SubCUTAneous PRN    dextrose 10 % infusion   IntraVENous Continuous PRN    amLODIPine (NORVASC) tablet 10 mg  10 mg Oral Daily    atorvastatin (LIPITOR) tablet 40 mg  40 mg Oral Daily    finasteride (PROSCAR) tablet 5 mg  5 mg Oral Daily    hydrALAZINE (APRESOLINE) tablet 100 mg  100 mg Oral BID    sertraline (ZOLOFT) tablet 50 mg  50 mg Oral Daily        LAB AND IMAGING FINDINGS:     Recent Results (from the past 24 hour(s))   POCT Glucose    Collection Time: 03/20/24  4:28 PM   Result Value Ref Range    POC Glucose 127 (H) 70 - 110 mg/dL   POCT Glucose    Collection Time: 03/20/24  8:15 PM   Result Value Ref Range    POC Glucose 127 (H) 70 - 110 mg/dL   POCT Glucose    Collection Time: 03/21/24  6:59 AM   Result Value Ref Range    POC Glucose 90 70 - 110 mg/dL             Total time: 75 minutes    Disclaimer: Sections of this note are dictated using utilizing voice recognition software, which may have resulted in some phonetic

## 2024-03-21 NOTE — PROGRESS NOTES
I spoke with patient's wife Mary at [phone], she mentioned his oxygen has been delivered.  Patient will be discharged home today.  Patient is advised to follow-up with PCP, nephrology and hematology in 2 weeks.  Patient verbalized understanding.

## 2024-03-22 ENCOUNTER — HOME CARE VISIT (OUTPATIENT)
Age: 69
End: 2024-03-22
Payer: MEDICARE

## 2024-03-22 VITALS
SYSTOLIC BLOOD PRESSURE: 180 MMHG | TEMPERATURE: 98.5 F | HEART RATE: 91 BPM | RESPIRATION RATE: 20 BRPM | OXYGEN SATURATION: 97 % | DIASTOLIC BLOOD PRESSURE: 70 MMHG

## 2024-03-22 PROCEDURE — G0299 HHS/HOSPICE OF RN EA 15 MIN: HCPCS

## 2024-03-22 PROCEDURE — G0151 HHCP-SERV OF PT,EA 15 MIN: HCPCS

## 2024-03-22 ASSESSMENT — ENCOUNTER SYMPTOMS: CONTUSION: 1

## 2024-03-22 NOTE — HOME HEALTH
Patient noted to become mildly short of breath when walking in the home.  SpO2 value remained in upper 90s, and his RR was restored within a couple of minutes.    Continued need:  HH PT for balance and activity tolerance improvement, ed re: oxygen safety.  Plan:  PT 1w1, 2w2, Discharge patient to self, CG and physician when goals are met or max potential achieved.  Re-assess for readiness to d/c, or need to recert and extend HH PT, per patient progress and Home Bound status.  Patient and CG are in agreement with the plan.

## 2024-03-23 ASSESSMENT — ENCOUNTER SYMPTOMS: DYSPNEA ACTIVITY LEVEL: AFTER AMBULATING 10 - 20 FT

## 2024-03-25 ENCOUNTER — HOME CARE VISIT (OUTPATIENT)
Age: 69
End: 2024-03-25
Payer: MEDICARE

## 2024-03-25 VITALS
DIASTOLIC BLOOD PRESSURE: 70 MMHG | SYSTOLIC BLOOD PRESSURE: 170 MMHG | TEMPERATURE: 98.5 F | HEART RATE: 91 BPM | OXYGEN SATURATION: 97 % | RESPIRATION RATE: 20 BRPM

## 2024-03-25 PROCEDURE — G0151 HHCP-SERV OF PT,EA 15 MIN: HCPCS

## 2024-03-25 ASSESSMENT — ENCOUNTER SYMPTOMS
DYSPNEA ACTIVITY LEVEL: AFTER AMBULATING MORE THAN 20 FT
HEMOPTYSIS: 0
STOOL DESCRIPTION: FORMED

## 2024-03-25 NOTE — HOME HEALTH
Skilled Reason for admission/summary of clinical condition:  anemia, edema, hypervolemia .  This patient is homebound for the following reasons Requires considerable and taxing effort to leave the home , Requires the assistance of 1 or more persons to leave the home  and Only leaves the home for medical reasons or Buddhist services and are infrequent and of short duration for other reasons .    Caregiver: spouse.  Caregiver assists with iadl's prn.    Medications reconciled and all medications are available in the home this visit.      The following education was provided regarding medications: take medications as ordered, do not stop or start any medication without notifying MD, obtain refills prn  Medications  are effective at this time.    Patient education provided this visit to include: as above, see care plan, role of SN.      Patient level of understanding of education provided: 100% understanding    Patient response to procedure performed:  receptive to teaching, tolerated assess and teaching without adverse effects noted.    Home exercise program/Homework provided: as above, paced progressive activity    Pt/Caregiver instructed on plan of care and are agreeable to plan of care at this time.  Patient working with PT at RN departure.    Discharge planning discussed with patient and caregiver.  Discharge planning as follows: this visit per patient request.  Pt/Caregiver did verbalize understanding of discharge planning.     Next MD appointment 9/19/24 (date) [facility].  Patient/caregiver encouraged/instructed to keep appointment as lack of follow through with physician appointment could result in discontinuation of home care services for non-compliance.

## 2024-03-26 ENCOUNTER — HOME CARE VISIT (OUTPATIENT)
Age: 69
End: 2024-03-26
Payer: MEDICARE

## 2024-03-26 VITALS
HEART RATE: 59 BPM | TEMPERATURE: 98.5 F | SYSTOLIC BLOOD PRESSURE: 150 MMHG | DIASTOLIC BLOOD PRESSURE: 66 MMHG | OXYGEN SATURATION: 98 % | RESPIRATION RATE: 18 BRPM

## 2024-03-26 VITALS
OXYGEN SATURATION: 98 % | SYSTOLIC BLOOD PRESSURE: 160 MMHG | RESPIRATION RATE: 18 BRPM | TEMPERATURE: 98.8 F | DIASTOLIC BLOOD PRESSURE: 78 MMHG

## 2024-03-26 PROCEDURE — G0152 HHCP-SERV OF OT,EA 15 MIN: HCPCS

## 2024-03-26 NOTE — HOME HEALTH
or modify activity as needed.  Skilled PT is needed in order to educate patient in the same in order to d=carry over HEP to continue to advance his progress.  PLAN FOR NEXT VISIT: Balance training, strengthening, gait outside and on steps to exit weather permitting.  THE FOLLOWING DISCHARGE PLANNING WAS DISCUSSED WITH THE PATIENT/CAREGIVER:   PT 3 more visits with re-assessment 4/3/24 to determine readiness for d/c or need to recert and continue services.

## 2024-03-26 NOTE — HOME HEALTH
Skilled Reason for admission/summary of clinical condition: 68 year old male with LOLIS orders s/p re-hospitalization with dxs of Edema, Hypervolemia, unspecified hypervolemia type, Iron deficiency anemia, unspecified iron deficiency anemia type, Acute on chronic anemia, Acute renal failure superimposed on stage 3b chronic kidney disease, unspecified acute renal failure type    Caregiver Involvement: Pt lives with spouse and sister in law who are able to assist with ADLs, IADLs, and functional mobility     PMHx: CKD, iron deficiency anemia, obesity, obstructive sleep apnea. Hypertensive heart and kidney disease     PLOF: Pt as ambulating using SPC for household and community distances. Pt was independent with ADLs and functional mobility     Medications: Pt reports no changes to medications since last reviewed and educated to continue as directed per MD.     SUBJECTIVE: Pt reports no pain on this date. Pt with residual RUE weakness and decreased coordation from CVA in 2019.     DME ORDERED/RECOMMENEDED: grab bars, 3:1 commode, tub transfer bench     OBJECTIVE:   BATHING: Pt has tub shower with grab bars. Pt completes bathing with supervision.  TOILETING: Pt supervision for toileting on standard toilet. Pt with low toilet height and would benefit from 3:1 commode over toilet to increase height for pt increased safety   UB DRESSING: Pt supervision for upper body dressing to anna/doff shirts   LB DRESSING: Pt CGA for lower body dressing to anna/doff socks, shoes and pants   GROOMING: Pt supervision for grooming for hair care, oral care and washing hands/face while standing at the sink   FEEDING: Pt setup assistance for self feeding     Modified Alfredito RPE 7/10 after performing ambulation, transfers, and I/ADL assessment. Pt notably SOB while ambulating. Pt educated on proper breathing techniques while completing functional mobility    RUE MMT: 2+/5   RUE AROM: Pt shoulder flexion ~45 degrees unassisted    LUE MMT: 4/5

## 2024-03-29 ENCOUNTER — HOME CARE VISIT (OUTPATIENT)
Age: 69
End: 2024-03-29
Payer: MEDICARE

## 2024-03-29 VITALS
TEMPERATURE: 98 F | SYSTOLIC BLOOD PRESSURE: 158 MMHG | OXYGEN SATURATION: 98 % | DIASTOLIC BLOOD PRESSURE: 88 MMHG | HEART RATE: 60 BPM

## 2024-03-29 PROCEDURE — G0157 HHC PT ASSISTANT EA 15: HCPCS

## 2024-03-29 NOTE — HOME HEALTH
SUBJECTIVE: No complaints of pain, pt had a fall earlier this week with minor injury, changes in m  CAREGIVER INVOLVEMENT/ASSISTANCE NEEDED FOR: Spouse assists patient as needed    OBJECTIVE: See interventions  PATIENT EDUCATION PROVIDED THIS VISIT: Pt educated on fall prevention strategies recommended throw rugs be removed from bathroom floor.    PATIENT RESPONSE TO EDUCATION: Pt and spouse verbalizes understanding of education and agree with recommendations   PATIENT RESPONSE TO TREATMENT/ASSESSMENT OF PROGRESS TOWARD GOALS: Established HEP consisting of     standing strengthening to bilateal LEs at counter for support instruction to slow down and to increase ROM pt instructed to perform HEP 2x day, walk every 1-2 hours with AD as tolerated. Sit to stand transfers with focus on immediate standing balance assist needed to maintain balance, gait tng on evn surfaces w/ small based quad cane instruction to slow nell pt is impulsive at times and moves quickly. O2 saturation 96-99 throughout tx session.  PLAN FOR NEXT VISIT: Next visit PFA/Reassessment with PT   DISCHARGE PLANS: POC and Discharge plans discussed pt understands and agrees eventual DC once goals have been met or max HC benefits have been achieved. 2 visits remaining anticipated DC end of next week

## 2024-04-02 ENCOUNTER — HOME CARE VISIT (OUTPATIENT)
Age: 69
End: 2024-04-02
Payer: MEDICARE

## 2024-04-02 VITALS
RESPIRATION RATE: 17 BRPM | DIASTOLIC BLOOD PRESSURE: 82 MMHG | HEART RATE: 62 BPM | OXYGEN SATURATION: 97 % | TEMPERATURE: 98.7 F | SYSTOLIC BLOOD PRESSURE: 155 MMHG

## 2024-04-02 VITALS
OXYGEN SATURATION: 97 % | DIASTOLIC BLOOD PRESSURE: 82 MMHG | HEART RATE: 62 BPM | SYSTOLIC BLOOD PRESSURE: 155 MMHG | TEMPERATURE: 97.7 F

## 2024-04-02 PROCEDURE — G0157 HHC PT ASSISTANT EA 15: HCPCS

## 2024-04-02 PROCEDURE — G0152 HHCP-SERV OF OT,EA 15 MIN: HCPCS

## 2024-04-02 NOTE — HOME HEALTH
SUBJECTIVE: Pt reports no pain on this date. Pt wife intermittently present throughout occupational therapy session on this date and causing disruption with veral disagreements with pt throughout. JOAQUIN Porter present throughout occupational therapy session    CAREGIVER INVOLVEMENT/ASSISTANCE NEEDED FOR: Pt wife assists with IADLs as needed    HOME HEALTH SUPPLIES BY TYPE AND QUANTITY ORDERED/DELIVERED THIS VISIT INCLUDE: N/A    OBJECTIVE: See interventions    PATIENT RESPONSE TO TREATMENT: Pt responded well to occupational therapy session on this date with no increase in pain on this date     PATIENT LEVEL OF UNDERSTANDING OF EDUCATION PROVIDED: Pt educated on continuing to complete BUE strengthening HEP to maintain/increase strength needed to complete ADLs, IADLs, and functional mobility. Pt educated on continuing to use energy conservation strategies within their daily routine to decrease fatigue and risk of falls. Pt educated on notifying MD of any changes in health status. Pt refused education on 3:1 commode options over the toilet to increase safety. Pt refused education on hazards of using door knob for support while completing bathroom mobility. Pt required moderate verbal cues throughout occupational therapy session to slow down tasks to decrease safety risks and fall hazards.    OCCUPATIONAL THERAPY DISCHARGE: Mr. Shetty has been seen by skilled home health occupational therapy services to address deficits in activity tolerance, BUE function, and functional mobility. Pt has reached maximal potential at this time and is ready/agreeable for discharge. Pt has reached barriers including resistance to education and home modifications for increasing safety, as well as reaching maximal potential with RUE neuro re-education, strengthening, and ROM. RUE Function: Pt has been provided with RUE AAROM and strengthening HEP to decreased risk of contractures/progression of contractures. Pt has progressed to completing HEP

## 2024-04-02 NOTE — HOME HEALTH
SUBJECTIVE: No compaints of pain, no falls, no changes in medications   CAREGIVER INVOLVEMENT/ASSISTANCE NEEDED FOR: Spouse assist pt as needed   OBJECTIVE: See interventions  PATIENT EDUCATION PROVIDED THIS VISIT: Pt educated on fall prevention strategies, breathing tech and energy conservation tech.   PATIENT RESPONSE TO EDUCATION: Pt verbalizes understanding of education   PATIENT RESPONSE TO TREATMENT/ASSESSMENT OF PROGRESS TOWARD GOALS: Pt instructed in standing strengthening to bilateral LEs instruction to slow nell and take his time pt tends to rush, transfers from commode, and dining chair, gait tng on even surfaces w/ quad cane on even surfaces instruction to decrease RLE stride length, slow down when turning.  Pt fatigues quickly with minimal exertion reports 4/10 fatigue, monitored O2 93% through ts session. Skilled PT intervention needed to address pain and swelling, deficits in strength, gait, transfers and balance to improve functional mobility and safety and lower risk for falls or injury.   PLAN FOR NEXT VISIT: PT DC or Reassessment recommend pt continue in outpatient setting   DISCHARGE PLANS: POC and Discharge plans discussed pt understands and agrees eventual DC once goals have been met or max HC benefits have been achieved.

## 2024-04-03 ENCOUNTER — HOME CARE VISIT (OUTPATIENT)
Age: 69
End: 2024-04-03
Payer: MEDICARE

## 2024-04-04 ENCOUNTER — HOME CARE VISIT (OUTPATIENT)
Age: 69
End: 2024-04-04

## 2024-04-04 ASSESSMENT — ENCOUNTER SYMPTOMS
DYSPNEA ACTIVITY LEVEL: AFTER AMBULATING MORE THAN 20 FT
CONTUSION: 1

## 2024-04-06 VITALS
TEMPERATURE: 98.8 F | SYSTOLIC BLOOD PRESSURE: 150 MMHG | OXYGEN SATURATION: 97 % | DIASTOLIC BLOOD PRESSURE: 60 MMHG | HEART RATE: 56 BPM | RESPIRATION RATE: 17 BRPM

## 2024-04-06 NOTE — HOME HEALTH
Pt. clinically discharged and documentation finalized for completion of agency discharge.    Patient Status:  68 y.o. male referred for HHPT after hospitalization for YARIEL, was d/c from HHPT then he was re-admitted for edema, Hypervolemia, .and new  PT orders were included in his hospital d/c LOLIS orders.   He continues to have balance impairment, but has achieved his PLOF.  Also he has begun to drive and therefore is no longer homebound.  He elects to continue PT in the outpatient setting.   Wounds: n/a  Caregiver involvement: His wife assists as needed.  Medications reconciled and all medications are available in the home this visit.   Medications  are effective at this time.    Home health supplies by type and quantity ordered/delivered this visit include: n/a  Patient education provided: Ed re: criteria for Home bound status, benefit of going to OPPT, goals met and results of PT d/c assessment.  Patient/caregiver response to education:  verbalized understanding.  Progress toward goals/ Patient response to treatment:  Gait:  Able to navigate home indep with AD, R lead R foot passing L, L step length shorter, intermittently passes R, reciprocal pattern with quad cane and decreased gradation of RLE.  Stairs:  Step to pattern holding handrail.  Transfers:  Uses L hand to transfer sit to/from stand, wide TISHA.  Balance:  Fall risk per Tinetti:  17 to 18/28, improved from LOLIS 15/28 ad initial: 9/28.    Strength:  He demonstrates functional strength to stead from chair, even WB between R & L LE.  ROM:  BLEs WFL.  Bed mobility:  Indep  Home exercise program:   Standing heel toe raises, hip abduction, marching in place, hip extension, mini squats x 15 reps x 1 set  Plan:  Discharge patient from Home Care to self, under physician's care.  Patient is in agreement with the plan.

## 2024-04-08 ENCOUNTER — APPOINTMENT (OUTPATIENT)
Facility: HOSPITAL | Age: 69
DRG: 291 | End: 2024-04-08
Payer: MEDICARE

## 2024-04-08 ENCOUNTER — HOSPITAL ENCOUNTER (INPATIENT)
Facility: HOSPITAL | Age: 69
LOS: 9 days | Discharge: HOME | DRG: 291 | End: 2024-04-17
Attending: EMERGENCY MEDICINE | Admitting: STUDENT IN AN ORGANIZED HEALTH CARE EDUCATION/TRAINING PROGRAM
Payer: MEDICARE

## 2024-04-08 DIAGNOSIS — I50.9 ACUTE DECOMPENSATED HEART FAILURE (HCC): Primary | ICD-10-CM

## 2024-04-08 DIAGNOSIS — K92.2 GASTROINTESTINAL HEMORRHAGE, UNSPECIFIED GASTROINTESTINAL HEMORRHAGE TYPE: ICD-10-CM

## 2024-04-08 DIAGNOSIS — I13.10 HYPERTENSIVE HEART AND KIDNEY DISEASE WITHOUT HEART FAILURE AND WITH STAGE 2 CHRONIC KIDNEY DISEASE: ICD-10-CM

## 2024-04-08 DIAGNOSIS — R60.9 EDEMA, UNSPECIFIED TYPE: ICD-10-CM

## 2024-04-08 DIAGNOSIS — D64.9 SYMPTOMATIC ANEMIA: ICD-10-CM

## 2024-04-08 DIAGNOSIS — N18.2 HYPERTENSIVE HEART AND KIDNEY DISEASE WITHOUT HEART FAILURE AND WITH STAGE 2 CHRONIC KIDNEY DISEASE: ICD-10-CM

## 2024-04-08 PROBLEM — K92.1 GASTROINTESTINAL HEMORRHAGE WITH MELENA: Status: ACTIVE | Noted: 2024-04-08

## 2024-04-08 LAB
ALBUMIN SERPL-MCNC: 2.8 G/DL (ref 3.4–5)
ALBUMIN/GLOB SERPL: 0.8 (ref 0.8–1.7)
ALP SERPL-CCNC: 93 U/L (ref 45–117)
ALT SERPL-CCNC: 28 U/L (ref 16–61)
ANION GAP SERPL CALC-SCNC: 7 MMOL/L (ref 3–18)
AST SERPL-CCNC: 44 U/L (ref 10–38)
BASOPHILS # BLD: 0.1 K/UL (ref 0–0.1)
BASOPHILS NFR BLD: 1 % (ref 0–2)
BILIRUB SERPL-MCNC: 0.8 MG/DL (ref 0.2–1)
BUN SERPL-MCNC: 66 MG/DL (ref 7–18)
BUN/CREAT SERPL: 18 (ref 12–20)
CALCIUM SERPL-MCNC: 8.4 MG/DL (ref 8.5–10.1)
CHLORIDE SERPL-SCNC: 108 MMOL/L (ref 100–111)
CO2 SERPL-SCNC: 30 MMOL/L (ref 21–32)
CREAT SERPL-MCNC: 3.71 MG/DL (ref 0.6–1.3)
DIFFERENTIAL METHOD BLD: ABNORMAL
EKG ATRIAL RATE: 63 BPM
EKG DIAGNOSIS: NORMAL
EKG Q-T INTERVAL: 496 MS
EKG QRS DURATION: 116 MS
EKG QTC CALCULATION (BAZETT): 486 MS
EKG R AXIS: 28 DEGREES
EKG T AXIS: 261 DEGREES
EKG VENTRICULAR RATE: 58 BPM
EOSINOPHIL # BLD: 0.1 K/UL (ref 0–0.4)
EOSINOPHIL NFR BLD: 1 % (ref 0–5)
ERYTHROCYTE [DISTWIDTH] IN BLOOD BY AUTOMATED COUNT: 17.8 % (ref 11.6–14.5)
EST. AVERAGE GLUCOSE BLD GHB EST-MCNC: ABNORMAL MG/DL
FERRITIN SERPL-MCNC: 171 NG/ML (ref 8–388)
FLUAV RNA SPEC QL NAA+PROBE: NOT DETECTED
FLUBV RNA SPEC QL NAA+PROBE: NOT DETECTED
GLOBULIN SER CALC-MCNC: 3.4 G/DL (ref 2–4)
GLUCOSE BLD STRIP.AUTO-MCNC: 95 MG/DL (ref 70–110)
GLUCOSE BLD STRIP.AUTO-MCNC: 95 MG/DL (ref 70–110)
GLUCOSE SERPL-MCNC: 103 MG/DL (ref 74–99)
HBA1C MFR BLD: <3.8 % (ref 4.2–5.6)
HCT VFR BLD AUTO: 19.6 % (ref 36–48)
HCT VFR BLD AUTO: 21.3 % (ref 36–48)
HGB BLD-MCNC: 5.9 G/DL (ref 13–16)
HGB BLD-MCNC: 6.6 G/DL (ref 13–16)
HISTORY CHECK: NORMAL
IMM GRANULOCYTES # BLD AUTO: 0.1 K/UL (ref 0–0.04)
IMM GRANULOCYTES NFR BLD AUTO: 1 % (ref 0–0.5)
IRON SATN MFR SERPL: 9 % (ref 20–50)
IRON SERPL-MCNC: 24 UG/DL (ref 50–175)
LYMPHOCYTES # BLD: 1.1 K/UL (ref 0.9–3.6)
LYMPHOCYTES NFR BLD: 9 % (ref 21–52)
MAGNESIUM SERPL-MCNC: 2.3 MG/DL (ref 1.6–2.6)
MCH RBC QN AUTO: 26.1 PG (ref 24–34)
MCHC RBC AUTO-ENTMCNC: 30.1 G/DL (ref 31–37)
MCV RBC AUTO: 86.7 FL (ref 78–100)
MONOCYTES # BLD: 1.1 K/UL (ref 0.05–1.2)
MONOCYTES NFR BLD: 9 % (ref 3–10)
NEUTS SEG # BLD: 9.9 K/UL (ref 1.8–8)
NEUTS SEG NFR BLD: 80 % (ref 40–73)
NRBC # BLD: 0.06 K/UL (ref 0–0.01)
NRBC BLD-RTO: 0.5 PER 100 WBC
NT PRO BNP: ABNORMAL PG/ML (ref 0–900)
PLATELET # BLD AUTO: 372 K/UL (ref 135–420)
PMV BLD AUTO: 10.3 FL (ref 9.2–11.8)
POTASSIUM SERPL-SCNC: 2.6 MMOL/L (ref 3.5–5.5)
PROT SERPL-MCNC: 6.2 G/DL (ref 6.4–8.2)
RBC # BLD AUTO: 2.26 M/UL (ref 4.35–5.65)
SARS-COV-2 RNA RESP QL NAA+PROBE: NOT DETECTED
SODIUM SERPL-SCNC: 145 MMOL/L (ref 136–145)
TIBC SERPL-MCNC: 261 UG/DL (ref 250–450)
TROPONIN I SERPL HS-MCNC: 77 NG/L (ref 0–78)
WBC # BLD AUTO: 12.4 K/UL (ref 4.6–13.2)

## 2024-04-08 PROCEDURE — 80053 COMPREHEN METABOLIC PANEL: CPT

## 2024-04-08 PROCEDURE — 99223 1ST HOSP IP/OBS HIGH 75: CPT | Performed by: STUDENT IN AN ORGANIZED HEALTH CARE EDUCATION/TRAINING PROGRAM

## 2024-04-08 PROCEDURE — 83735 ASSAY OF MAGNESIUM: CPT

## 2024-04-08 PROCEDURE — 6370000000 HC RX 637 (ALT 250 FOR IP): Performed by: STUDENT IN AN ORGANIZED HEALTH CARE EDUCATION/TRAINING PROGRAM

## 2024-04-08 PROCEDURE — 83880 ASSAY OF NATRIURETIC PEPTIDE: CPT

## 2024-04-08 PROCEDURE — 83036 HEMOGLOBIN GLYCOSYLATED A1C: CPT

## 2024-04-08 PROCEDURE — 71045 X-RAY EXAM CHEST 1 VIEW: CPT

## 2024-04-08 PROCEDURE — 6360000002 HC RX W HCPCS: Performed by: STUDENT IN AN ORGANIZED HEALTH CARE EDUCATION/TRAINING PROGRAM

## 2024-04-08 PROCEDURE — 82962 GLUCOSE BLOOD TEST: CPT

## 2024-04-08 PROCEDURE — 6370000000 HC RX 637 (ALT 250 FOR IP): Performed by: EMERGENCY MEDICINE

## 2024-04-08 PROCEDURE — 85025 COMPLETE CBC W/AUTO DIFF WBC: CPT

## 2024-04-08 PROCEDURE — 83540 ASSAY OF IRON: CPT

## 2024-04-08 PROCEDURE — 85014 HEMATOCRIT: CPT

## 2024-04-08 PROCEDURE — 96366 THER/PROPH/DIAG IV INF ADDON: CPT

## 2024-04-08 PROCEDURE — 86900 BLOOD TYPING SEROLOGIC ABO: CPT

## 2024-04-08 PROCEDURE — 1100000000 HC RM PRIVATE

## 2024-04-08 PROCEDURE — 87636 SARSCOV2 & INF A&B AMP PRB: CPT

## 2024-04-08 PROCEDURE — 30233N1 TRANSFUSION OF NONAUTOLOGOUS RED BLOOD CELLS INTO PERIPHERAL VEIN, PERCUTANEOUS APPROACH: ICD-10-PCS | Performed by: STUDENT IN AN ORGANIZED HEALTH CARE EDUCATION/TRAINING PROGRAM

## 2024-04-08 PROCEDURE — P9016 RBC LEUKOCYTES REDUCED: HCPCS

## 2024-04-08 PROCEDURE — 86901 BLOOD TYPING SEROLOGIC RH(D): CPT

## 2024-04-08 PROCEDURE — 83550 IRON BINDING TEST: CPT

## 2024-04-08 PROCEDURE — 86923 COMPATIBILITY TEST ELECTRIC: CPT

## 2024-04-08 PROCEDURE — 93010 ELECTROCARDIOGRAM REPORT: CPT | Performed by: INTERNAL MEDICINE

## 2024-04-08 PROCEDURE — 86850 RBC ANTIBODY SCREEN: CPT

## 2024-04-08 PROCEDURE — 84484 ASSAY OF TROPONIN QUANT: CPT

## 2024-04-08 PROCEDURE — 99285 EMERGENCY DEPT VISIT HI MDM: CPT

## 2024-04-08 PROCEDURE — 82728 ASSAY OF FERRITIN: CPT

## 2024-04-08 PROCEDURE — 96365 THER/PROPH/DIAG IV INF INIT: CPT

## 2024-04-08 PROCEDURE — 6360000002 HC RX W HCPCS: Performed by: EMERGENCY MEDICINE

## 2024-04-08 PROCEDURE — 85018 HEMOGLOBIN: CPT

## 2024-04-08 PROCEDURE — 93005 ELECTROCARDIOGRAM TRACING: CPT | Performed by: EMERGENCY MEDICINE

## 2024-04-08 PROCEDURE — 94761 N-INVAS EAR/PLS OXIMETRY MLT: CPT

## 2024-04-08 RX ORDER — POTASSIUM CHLORIDE 7.45 MG/ML
10 INJECTION INTRAVENOUS
Status: DISPENSED | OUTPATIENT
Start: 2024-04-08 | End: 2024-04-08

## 2024-04-08 RX ORDER — FUROSEMIDE 10 MG/ML
40 INJECTION INTRAMUSCULAR; INTRAVENOUS 2 TIMES DAILY
Status: DISCONTINUED | OUTPATIENT
Start: 2024-04-08 | End: 2024-04-08

## 2024-04-08 RX ORDER — INSULIN LISPRO 100 [IU]/ML
0-4 INJECTION, SOLUTION INTRAVENOUS; SUBCUTANEOUS
Status: DISCONTINUED | OUTPATIENT
Start: 2024-04-08 | End: 2024-04-17 | Stop reason: HOSPADM

## 2024-04-08 RX ORDER — ATORVASTATIN CALCIUM 40 MG/1
40 TABLET, FILM COATED ORAL DAILY
Status: DISCONTINUED | OUTPATIENT
Start: 2024-04-08 | End: 2024-04-17 | Stop reason: HOSPADM

## 2024-04-08 RX ORDER — DEXTROSE MONOHYDRATE 100 MG/ML
INJECTION, SOLUTION INTRAVENOUS CONTINUOUS PRN
Status: DISCONTINUED | OUTPATIENT
Start: 2024-04-08 | End: 2024-04-17 | Stop reason: HOSPADM

## 2024-04-08 RX ORDER — INSULIN LISPRO 100 [IU]/ML
0-4 INJECTION, SOLUTION INTRAVENOUS; SUBCUTANEOUS NIGHTLY
Status: DISCONTINUED | OUTPATIENT
Start: 2024-04-08 | End: 2024-04-17 | Stop reason: HOSPADM

## 2024-04-08 RX ORDER — HYDRALAZINE HYDROCHLORIDE 50 MG/1
100 TABLET, FILM COATED ORAL 2 TIMES DAILY
Status: DISCONTINUED | OUTPATIENT
Start: 2024-04-08 | End: 2024-04-10

## 2024-04-08 RX ORDER — FINASTERIDE 5 MG/1
5 TABLET, FILM COATED ORAL DAILY
Status: DISCONTINUED | OUTPATIENT
Start: 2024-04-08 | End: 2024-04-17 | Stop reason: HOSPADM

## 2024-04-08 RX ORDER — AMLODIPINE BESYLATE 10 MG/1
10 TABLET ORAL DAILY
Status: DISCONTINUED | OUTPATIENT
Start: 2024-04-08 | End: 2024-04-17 | Stop reason: HOSPADM

## 2024-04-08 RX ORDER — FUROSEMIDE 10 MG/ML
40 INJECTION INTRAMUSCULAR; INTRAVENOUS 2 TIMES DAILY
Status: DISCONTINUED | OUTPATIENT
Start: 2024-04-08 | End: 2024-04-10

## 2024-04-08 RX ORDER — SODIUM CHLORIDE 9 MG/ML
INJECTION, SOLUTION INTRAVENOUS PRN
Status: DISCONTINUED | OUTPATIENT
Start: 2024-04-08 | End: 2024-04-16

## 2024-04-08 RX ADMIN — POTASSIUM CHLORIDE 10 MEQ: 7.46 INJECTION, SOLUTION INTRAVENOUS at 11:13

## 2024-04-08 RX ADMIN — POTASSIUM CHLORIDE 10 MEQ: 7.46 INJECTION, SOLUTION INTRAVENOUS at 13:53

## 2024-04-08 RX ADMIN — HYDRALAZINE HYDROCHLORIDE 100 MG: 50 TABLET ORAL at 15:46

## 2024-04-08 RX ADMIN — POTASSIUM CHLORIDE 10 MEQ: 7.46 INJECTION, SOLUTION INTRAVENOUS at 14:58

## 2024-04-08 RX ADMIN — FINASTERIDE 5 MG: 5 TABLET, FILM COATED ORAL at 22:57

## 2024-04-08 RX ADMIN — POTASSIUM BICARBONATE 40 MEQ: 782 TABLET, EFFERVESCENT ORAL at 11:04

## 2024-04-08 RX ADMIN — FUROSEMIDE 40 MG: 10 INJECTION, SOLUTION INTRAMUSCULAR; INTRAVENOUS at 16:20

## 2024-04-08 RX ADMIN — AMLODIPINE BESYLATE 10 MG: 10 TABLET ORAL at 15:46

## 2024-04-08 RX ADMIN — SERTRALINE HYDROCHLORIDE 50 MG: 50 TABLET ORAL at 15:46

## 2024-04-08 RX ADMIN — ATORVASTATIN CALCIUM 40 MG: 40 TABLET, FILM COATED ORAL at 21:48

## 2024-04-08 ASSESSMENT — LIFESTYLE VARIABLES
HOW OFTEN DO YOU HAVE A DRINK CONTAINING ALCOHOL: NEVER
HOW MANY STANDARD DRINKS CONTAINING ALCOHOL DO YOU HAVE ON A TYPICAL DAY: PATIENT DOES NOT DRINK

## 2024-04-08 NOTE — ED PROVIDER NOTES
EMERGENCY DEPARTMENT HISTORY AND PHYSICAL EXAM      Patient Name: Amish Shetty  MRN: [id number]  YOB: 1955  Provider: Clotilde Aparicio MD  PCP: Caleb Hurst MD   Time/Date of evaluation: 9:04 AM EDT on 4/8/24    History of Presenting Illness     No chief complaint on file.      History Provided By: EMS and Patient     History (Narative):   Amish Shetty is a 68 y.o. male with a PMHX of atrial fibrillation, CHF, anemia, CVA, gout, hypokalemia, chronic kidney disease, hypertension, obstructive sleep apnea, hyperlipidemia, and diabetes  who presents to the emergency department  by EMS C/O shortness of breath that began yesterday.  The patient states that he has home oxygen available as needed.  He put himself on his home oxygen yesterday and started to feel better.  He wears oxygen overnight but then early this morning, he got up to use the restroom and then felt very short of breath.  He put his pulse ox on his finger and his oxygen was 83%.    The patient states that he was just recently admitted to the hospital for anemia requiring transfusion of 3 units of packed red blood cells.    The patient takes aspirin for his atrial fibrillation.  He is not on any blood thinners at this time.    Past History     Past Medical History:  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     On Allopurinol    Chronic hypokalemia     Persistent chronic hypokalemia + hypertension; ?primary hyperaldosteronism    CKD (chronic kidney disease)     Hypertension     Obesity, Class I, BMI 30-34.9     Obstructive sleep apnea on CPAP     Pure hypercholesterolemia     Received intravenous tissue plasminogen activator (tPA) in emergency department 8/12/2019    Secondary hyperparathyroidism of

## 2024-04-08 NOTE — ED NOTES
Spoke with Dr. Cummins regarding pt status. Informed MD that pt BP and breathing improving, however does not appear to have much urinary output since lasix administration. Inquired if next unit of PRBC should be started yet. New order to hold second unit until H&H is redrawn, then re-evaluate need for it.     Per MD, disregard occult stool testing at this time.

## 2024-04-08 NOTE — H&P
History & Physical    Patient: Amish Shetty MRN: [id number]  CSN: [id number]    YOB: 1955  Age: 68 y.o.  Sex: male      DOA: 4/8/2024    Chief Complaint:   Chief Complaint   Patient presents with    Shortness of Breath          HPI:     Amish Shetty is a 68 y.o.  male with hx of iron deficiency anemia, hypertension, CKD with proteinuria status post biopsy which shows amyloidosis, history of CVA with lingering right arm weakness.    Patient presented to [facility] emergency department secondary to shortness of breath which began yesterday.  Patient stated that he does get home oxygen as needed.    Initial hemoglobin 5.9.  Notably patient was discharged on 321 with hemoglobin of 9.2.  Patient has received 1 unit packed red blood cell in emergency department.    During March admit, patient did not undergo inpatient scoping.  GI was consulted.    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     On Allopurinol    Chronic hypokalemia     Persistent chronic hypokalemia + hypertension; ?primary hyperaldosteronism    CKD (chronic kidney disease)     Hypertension     Obesity, Class I, BMI 30-34.9     Obstructive sleep apnea on CPAP     Pure hypercholesterolemia     Received intravenous tissue plasminogen activator (tPA) in emergency department 8/12/2019    Secondary hyperparathyroidism of renal origin (HCC) 8/18/2019    PTH (8/18/2019) = 101.7    Type 2 diabetes mellitus with stage 2 chronic kidney disease (HCC)     HbA1c (8/13/2019) = 6.6 no meds    Vitamin B12 deficiency anemia 8/14/2019    Vitamin B12 (8/14/2019) = 208    Vitamin D deficiency 8/19/2019    Vitamin D 25-Hydroxy (8/19/2019) = 16.2        Past Surgical History:   Procedure Laterality Date     (H) 40 - 73 %    Lymphocytes % 9 (L) 21 - 52 %    Monocytes % 9 3 - 10 %    Eosinophils % 1 0 - 5 %    Basophils % 1 0 - 2 %    Immature Granulocytes % 1 (H) 0.0 - 0.5 %    Neutrophils Absolute 9.9 (H) 1.8 - 8.0 K/UL    Lymphocytes Absolute 1.1 0.9 - 3.6 K/UL    Monocytes Absolute 1.1 0.05 - 1.2 K/UL    Eosinophils Absolute 0.1 0.0 - 0.4 K/UL    Basophils Absolute 0.1 0.0 - 0.1 K/UL    Immature Granulocytes Absolute 0.1 (H) 0.00 - 0.04 K/UL    Differential Type AUTOMATED     Comprehensive Metabolic Panel    Collection Time: 04/08/24  9:00 AM   Result Value Ref Range    Sodium 145 136 - 145 mmol/L    Potassium 2.6 (LL) 3.5 - 5.5 mmol/L    Chloride 108 100 - 111 mmol/L    CO2 30 21 - 32 mmol/L    Anion Gap 7 3.0 - 18 mmol/L    Glucose 103 (H) 74 - 99 mg/dL    BUN 66 (H) 7.0 - 18 MG/DL    Creatinine 3.71 (H) 0.6 - 1.3 MG/DL    Bun/Cre Ratio 18 12 - 20      Est, Glom Filt Rate 17 (L) >60 ml/min/1.73m2    Calcium 8.4 (L) 8.5 - 10.1 MG/DL    Total Bilirubin 0.8 0.2 - 1.0 MG/DL    ALT 28 16 - 61 U/L    AST 44 (H) 10 - 38 U/L    Alk Phosphatase 93 45 - 117 U/L    Total Protein 6.2 (L) 6.4 - 8.2 g/dL    Albumin 2.8 (L) 3.4 - 5.0 g/dL    Globulin 3.4 2.0 - 4.0 g/dL    Albumin/Globulin Ratio 0.8 0.8 - 1.7     Magnesium    Collection Time: 04/08/24  9:00 AM   Result Value Ref Range    Magnesium 2.3 1.6 - 2.6 mg/dL   Brain Natriuretic Peptide    Collection Time: 04/08/24  9:00 AM   Result Value Ref Range    NT Pro-BNP 22,633 (H) 0 - 900 PG/ML   Troponin    Collection Time: 04/08/24  9:00 AM   Result Value Ref Range    Troponin, High Sensitivity 77 0 - 78 ng/L   EKG 12 Lead    Collection Time: 04/08/24  9:12 AM   Result Value Ref Range    Ventricular Rate 58 BPM    Atrial Rate 63 BPM    QRS Duration 116 ms    Q-T Interval 496 ms    QTc Calculation (Bazett) 486 ms    R Axis 28 degrees    T Axis 261 degrees    Diagnosis       Atrial fibrillation with slow ventricular response  Anteroseptal infarct (cited on or before

## 2024-04-08 NOTE — ED NOTES
Bedside shift change report given to Maite RN (oncoming nurse) by Jared GONGORA (offgoing nurse). Report included the following information ED Encounter Summary.

## 2024-04-08 NOTE — CONSENT
Informed Consent for Blood Component Transfusion Note    I have discussed with the patient the rationale for blood component transfusion; its benefits in treating or preventing fatigue, organ damage, or death; and its risk which includes mild transfusion reactions, rare risk of blood borne infection, or more serious but rare reactions. I have discussed the alternatives to transfusion, including the risk and consequences of not receiving transfusion. The patient had an opportunity to ask questions and had agreed to proceed with transfusion of blood components.    Electronically signed by Clotilde Aparicio MD on 4/8/24 at 1:23 PM EDT

## 2024-04-08 NOTE — ED TRIAGE NOTES
Pt arrives via EMS from home c/o SOB since yesterday. Pt unable to speak in full sentences.     Hx CHF, CVA, a fib.

## 2024-04-08 NOTE — CONSENT
Informed Consent for Blood Component Transfusion Note    I have discussed with the patient the rationale for blood component transfusion; its benefits in treating or preventing fatigue, organ damage, or death; and its risk which includes mild transfusion reactions, rare risk of blood borne infection, or more serious but rare reactions. I have discussed the alternatives to transfusion, including the risk and consequences of not receiving transfusion. The patient had an opportunity to ask questions and had agreed to proceed with transfusion of blood components.    Electronically signed by Clotilde Aparicio MD on 4/8/24 at 9:31 AM EDT

## 2024-04-09 LAB
ALBUMIN SERPL-MCNC: 2.6 G/DL (ref 3.4–5)
ALBUMIN/GLOB SERPL: 0.7 (ref 0.8–1.7)
ALP SERPL-CCNC: 86 U/L (ref 45–117)
ALT SERPL-CCNC: 27 U/L (ref 16–61)
ANION GAP SERPL CALC-SCNC: 6 MMOL/L (ref 3–18)
ANION GAP SERPL CALC-SCNC: 7 MMOL/L (ref 3–18)
ANION GAP SERPL CALC-SCNC: 8 MMOL/L (ref 3–18)
AST SERPL-CCNC: 35 U/L (ref 10–38)
BILIRUB SERPL-MCNC: 1.2 MG/DL (ref 0.2–1)
BUN SERPL-MCNC: 59 MG/DL (ref 7–18)
BUN SERPL-MCNC: 60 MG/DL (ref 7–18)
BUN SERPL-MCNC: 62 MG/DL (ref 7–18)
BUN/CREAT SERPL: 18 (ref 12–20)
BUN/CREAT SERPL: 18 (ref 12–20)
BUN/CREAT SERPL: 19 (ref 12–20)
CALCIUM SERPL-MCNC: 7.9 MG/DL (ref 8.5–10.1)
CALCIUM SERPL-MCNC: 8.3 MG/DL (ref 8.5–10.1)
CALCIUM SERPL-MCNC: 8.5 MG/DL (ref 8.5–10.1)
CHLORIDE SERPL-SCNC: 106 MMOL/L (ref 100–111)
CO2 SERPL-SCNC: 28 MMOL/L (ref 21–32)
CO2 SERPL-SCNC: 29 MMOL/L (ref 21–32)
CO2 SERPL-SCNC: 29 MMOL/L (ref 21–32)
CREAT SERPL-MCNC: 3.29 MG/DL (ref 0.6–1.3)
CREAT SERPL-MCNC: 3.32 MG/DL (ref 0.6–1.3)
CREAT SERPL-MCNC: 3.33 MG/DL (ref 0.6–1.3)
ERYTHROCYTE [DISTWIDTH] IN BLOOD BY AUTOMATED COUNT: 17.6 % (ref 11.6–14.5)
GLOBULIN SER CALC-MCNC: 3.7 G/DL (ref 2–4)
GLUCOSE BLD STRIP.AUTO-MCNC: 131 MG/DL (ref 70–110)
GLUCOSE BLD STRIP.AUTO-MCNC: 161 MG/DL (ref 70–110)
GLUCOSE BLD STRIP.AUTO-MCNC: 164 MG/DL (ref 70–110)
GLUCOSE BLD STRIP.AUTO-MCNC: 87 MG/DL (ref 70–110)
GLUCOSE SERPL-MCNC: 128 MG/DL (ref 74–99)
GLUCOSE SERPL-MCNC: 151 MG/DL (ref 74–99)
GLUCOSE SERPL-MCNC: 83 MG/DL (ref 74–99)
HCT VFR BLD AUTO: 24.3 % (ref 36–48)
HCT VFR BLD AUTO: 24.8 % (ref 36–48)
HEMOCCULT STL QL: POSITIVE
HGB BLD-MCNC: 7.6 G/DL (ref 13–16)
HGB BLD-MCNC: 7.7 G/DL (ref 13–16)
MAGNESIUM SERPL-MCNC: 2.3 MG/DL (ref 1.6–2.6)
MCH RBC QN AUTO: 25.8 PG (ref 24–34)
MCHC RBC AUTO-ENTMCNC: 30.6 G/DL (ref 31–37)
MCV RBC AUTO: 84.1 FL (ref 78–100)
NRBC # BLD: 0.05 K/UL (ref 0–0.01)
NRBC BLD-RTO: 0.4 PER 100 WBC
PLATELET # BLD AUTO: 319 K/UL (ref 135–420)
PMV BLD AUTO: 9.9 FL (ref 9.2–11.8)
POTASSIUM SERPL-SCNC: 2.3 MMOL/L (ref 3.5–5.5)
POTASSIUM SERPL-SCNC: 2.7 MMOL/L (ref 3.5–5.5)
POTASSIUM SERPL-SCNC: 2.7 MMOL/L (ref 3.5–5.5)
POTASSIUM SERPL-SCNC: 2.8 MMOL/L (ref 3.5–5.5)
POTASSIUM SERPL-SCNC: 3.2 MMOL/L (ref 3.5–5.5)
PROT SERPL-MCNC: 6.3 G/DL (ref 6.4–8.2)
RBC # BLD AUTO: 2.95 M/UL (ref 4.35–5.65)
SODIUM SERPL-SCNC: 141 MMOL/L (ref 136–145)
SODIUM SERPL-SCNC: 142 MMOL/L (ref 136–145)
SODIUM SERPL-SCNC: 142 MMOL/L (ref 136–145)
WBC # BLD AUTO: 11.4 K/UL (ref 4.6–13.2)

## 2024-04-09 PROCEDURE — 99232 SBSQ HOSP IP/OBS MODERATE 35: CPT | Performed by: INTERNAL MEDICINE

## 2024-04-09 PROCEDURE — 85027 COMPLETE CBC AUTOMATED: CPT

## 2024-04-09 PROCEDURE — 6360000002 HC RX W HCPCS: Performed by: INTERNAL MEDICINE

## 2024-04-09 PROCEDURE — 6370000000 HC RX 637 (ALT 250 FOR IP): Performed by: STUDENT IN AN ORGANIZED HEALTH CARE EDUCATION/TRAINING PROGRAM

## 2024-04-09 PROCEDURE — 36415 COLL VENOUS BLD VENIPUNCTURE: CPT

## 2024-04-09 PROCEDURE — 82272 OCCULT BLD FECES 1-3 TESTS: CPT

## 2024-04-09 PROCEDURE — 83735 ASSAY OF MAGNESIUM: CPT

## 2024-04-09 PROCEDURE — 82962 GLUCOSE BLOOD TEST: CPT

## 2024-04-09 PROCEDURE — 84132 ASSAY OF SERUM POTASSIUM: CPT

## 2024-04-09 PROCEDURE — 80053 COMPREHEN METABOLIC PANEL: CPT

## 2024-04-09 PROCEDURE — 2140000001 HC CVICU INTERMEDIATE R&B

## 2024-04-09 PROCEDURE — 6370000000 HC RX 637 (ALT 250 FOR IP): Performed by: INTERNAL MEDICINE

## 2024-04-09 PROCEDURE — 80048 BASIC METABOLIC PNL TOTAL CA: CPT

## 2024-04-09 PROCEDURE — 6360000002 HC RX W HCPCS: Performed by: STUDENT IN AN ORGANIZED HEALTH CARE EDUCATION/TRAINING PROGRAM

## 2024-04-09 RX ORDER — POTASSIUM CHLORIDE 20 MEQ/1
40 TABLET, EXTENDED RELEASE ORAL PRN
Status: DISCONTINUED | OUTPATIENT
Start: 2024-04-09 | End: 2024-04-17 | Stop reason: HOSPADM

## 2024-04-09 RX ORDER — ACETAMINOPHEN 325 MG/1
650 TABLET ORAL EVERY 6 HOURS PRN
Status: DISCONTINUED | OUTPATIENT
Start: 2024-04-09 | End: 2024-04-17 | Stop reason: HOSPADM

## 2024-04-09 RX ORDER — POTASSIUM CHLORIDE 7.45 MG/ML
10 INJECTION INTRAVENOUS PRN
Status: DISCONTINUED | OUTPATIENT
Start: 2024-04-09 | End: 2024-04-17 | Stop reason: HOSPADM

## 2024-04-09 RX ORDER — SODIUM CHLORIDE 9 MG/ML
INJECTION, SOLUTION INTRAVENOUS PRN
Status: CANCELLED | OUTPATIENT
Start: 2024-04-09

## 2024-04-09 RX ORDER — SODIUM CHLORIDE 0.9 % (FLUSH) 0.9 %
5-40 SYRINGE (ML) INJECTION EVERY 12 HOURS SCHEDULED
Status: CANCELLED | OUTPATIENT
Start: 2024-04-09

## 2024-04-09 RX ORDER — INSULIN LISPRO 100 [IU]/ML
0-4 INJECTION, SOLUTION INTRAVENOUS; SUBCUTANEOUS EVERY 4 HOURS
Status: DISCONTINUED | OUTPATIENT
Start: 2024-04-09 | End: 2024-04-09

## 2024-04-09 RX ORDER — SODIUM CHLORIDE 0.9 % (FLUSH) 0.9 %
5-40 SYRINGE (ML) INJECTION PRN
Status: CANCELLED | OUTPATIENT
Start: 2024-04-09

## 2024-04-09 RX ORDER — INSULIN LISPRO 100 [IU]/ML
0-4 INJECTION, SOLUTION INTRAVENOUS; SUBCUTANEOUS ONCE
Status: DISCONTINUED | OUTPATIENT
Start: 2024-04-09 | End: 2024-04-10 | Stop reason: HOSPADM

## 2024-04-09 RX ORDER — POTASSIUM CHLORIDE 20 MEQ/1
40 TABLET, EXTENDED RELEASE ORAL ONCE
Status: COMPLETED | OUTPATIENT
Start: 2024-04-09 | End: 2024-04-09

## 2024-04-09 RX ORDER — POTASSIUM CHLORIDE 7.45 MG/ML
10 INJECTION INTRAVENOUS
Status: DISCONTINUED | OUTPATIENT
Start: 2024-04-09 | End: 2024-04-09

## 2024-04-09 RX ORDER — DEXTROSE MONOHYDRATE 100 MG/ML
INJECTION, SOLUTION INTRAVENOUS CONTINUOUS PRN
Status: DISCONTINUED | OUTPATIENT
Start: 2024-04-09 | End: 2024-04-10 | Stop reason: HOSPADM

## 2024-04-09 RX ADMIN — ACETAMINOPHEN 325MG 650 MG: 325 TABLET ORAL at 16:50

## 2024-04-09 RX ADMIN — POTASSIUM CHLORIDE 10 MEQ: 7.46 INJECTION, SOLUTION INTRAVENOUS at 12:02

## 2024-04-09 RX ADMIN — HYDRALAZINE HYDROCHLORIDE 100 MG: 50 TABLET ORAL at 16:14

## 2024-04-09 RX ADMIN — FUROSEMIDE 40 MG: 10 INJECTION, SOLUTION INTRAMUSCULAR; INTRAVENOUS at 08:10

## 2024-04-09 RX ADMIN — POTASSIUM CHLORIDE 10 MEQ: 7.46 INJECTION, SOLUTION INTRAVENOUS at 08:12

## 2024-04-09 RX ADMIN — HYDRALAZINE HYDROCHLORIDE 100 MG: 50 TABLET ORAL at 09:41

## 2024-04-09 RX ADMIN — POTASSIUM CHLORIDE 40 MEQ: 1500 TABLET, EXTENDED RELEASE ORAL at 16:49

## 2024-04-09 RX ADMIN — POTASSIUM CHLORIDE 10 MEQ: 7.46 INJECTION, SOLUTION INTRAVENOUS at 09:55

## 2024-04-09 RX ADMIN — POTASSIUM CHLORIDE 10 MEQ: 7.46 INJECTION, SOLUTION INTRAVENOUS at 15:22

## 2024-04-09 RX ADMIN — ATORVASTATIN CALCIUM 40 MG: 40 TABLET, FILM COATED ORAL at 21:20

## 2024-04-09 RX ADMIN — POTASSIUM CHLORIDE 40 MEQ: 1500 TABLET, EXTENDED RELEASE ORAL at 21:20

## 2024-04-09 RX ADMIN — SERTRALINE HYDROCHLORIDE 50 MG: 50 TABLET ORAL at 09:42

## 2024-04-09 RX ADMIN — POTASSIUM CHLORIDE 10 MEQ: 7.46 INJECTION, SOLUTION INTRAVENOUS at 06:08

## 2024-04-09 RX ADMIN — POTASSIUM CHLORIDE 10 MEQ: 7.46 INJECTION, SOLUTION INTRAVENOUS at 13:00

## 2024-04-09 RX ADMIN — POTASSIUM CHLORIDE 10 MEQ: 7.46 INJECTION, SOLUTION INTRAVENOUS at 10:58

## 2024-04-09 RX ADMIN — FINASTERIDE 5 MG: 5 TABLET, FILM COATED ORAL at 21:20

## 2024-04-09 RX ADMIN — POTASSIUM CHLORIDE 10 MEQ: 7.46 INJECTION, SOLUTION INTRAVENOUS at 14:12

## 2024-04-09 ASSESSMENT — PAIN SCALES - GENERAL
PAINLEVEL_OUTOF10: 0
PAINLEVEL_OUTOF10: 0
PAINLEVEL_OUTOF10: 6
PAINLEVEL_OUTOF10: 0
PAINLEVEL_OUTOF10: 7

## 2024-04-09 ASSESSMENT — PAIN DESCRIPTION - ORIENTATION: ORIENTATION: LEFT

## 2024-04-09 ASSESSMENT — PAIN DESCRIPTION - DESCRIPTORS: DESCRIPTORS: STABBING

## 2024-04-09 ASSESSMENT — PAIN DESCRIPTION - LOCATION: LOCATION: KNEE

## 2024-04-09 NOTE — ED NOTES
Received bedside report from Matthew GONGORA.     Pt currently resting in bed, awake and alert. Admitted, pending bed assignment. NAD. O2 via NC in place. Call bell within reach.

## 2024-04-09 NOTE — PROGRESS NOTES
Bedside shift change report given to Alejandro RN (oncoming nurse) by Maite RN (offgoing nurse). Report included the following information ED Encounter Summary.

## 2024-04-09 NOTE — ED NOTES
Clarified IV potassium orders with MD. Pt will be repleted per replacement protocol now. Since pt has received 2 runs of K, pt will receive 4 more, then recheck potassium 1 hour post.

## 2024-04-09 NOTE — ED NOTES
Per Dr. Lopez , patient will hold oral potassium this morning. Patient has a procedure this morning.

## 2024-04-09 NOTE — ACP (ADVANCE CARE PLANNING)
Advance Care Planning   Healthcare Decision Maker:    Primary Decision Maker (Active): Mary Shetty - Spouse - [phone]    Secondary Decision Maker: Avis Esparza - Child - [phone]    Click here to complete Healthcare Decision Makers including selection of the Healthcare Decision Maker Relationship (ie \"Primary\").          completed the initial Spiritual Assessment of the patient, and offered Pastoral Care, support to the patient in bed 7 of the emergency room  where he will be admitted to the hospital due to acute diastolic heart failure. There is an advance directive present. Patient does not have any Church/cultural needs that will affect patient’s preferences in health care. Chaplains will continue to follow and will provide pastoral care on an as needed/requested basis.    juan Valenzuela  Board Certified   Spiritual Care Department  [phone]

## 2024-04-09 NOTE — ED NOTES
Patient is in bed , resting with eyes closed . Patient is alert and oriented. Patient is on continuous cardiac monitoring.

## 2024-04-09 NOTE — PROGRESS NOTES
[email or web address]  [phone]    Gastroenterology follow up-Progress note    Impression:  1. Acute on chronic ADELINE   - Hgb 5.9 upon admission w/ baseline in 7-9 range. FOB in March on last admission with same H/H. 3/17 NM scan neg. No overt GI bleeding, abd pain, or n/v.   -H/H currently 7.6/24.8  -ferritin 171, Iron 24, iron sat 9%  - Followed by [facility] as OP w/ dx of ADELINE and anemia of chronic disease; received IV iron last 6/2023 but should be on PO iron now, also on q8wk darbepoeitin per 12/2023 [facility] note.   - GI w/u of chronic ADELINE includes EGD-[place] in 2019 w/o significant findings. Pill cam study in 2021 revealed possible old blood in small bowel and possible ulcerations in colon prompting repeat colo in 2021 which revealed only TA polyps w/ 5yr recall   2. CHF  3. A fib - not on anticoagulation other than 81 mg ASA  4. CKD   5. HTN  6. CVA (2019)  7. Hypokalemia - up to 2.7 after 40 mEq IV infused    Plan:  1. Monitor H/H and transfuse as per protocol  2. Check ferritin/iron profile (ordered) and replace as indicated  3. Replace potassium  4. Fecal occult blood testing - ordered  5. NPO p MN for EGD moved to tomorrow due to hypokalemia  6. May have regular diet from GI perspective now that procedure cancelled for today    Chief Complaint: symptomatic anemia with SOB      Subjective:  No abdominal pain, reflux, hematemesis, melena, or hematochezia.     ROS: Denies any fevers, chills, rash.     Eyes: conjunctiva normal, EOM normal   Neck: ROM normal   Cardiovascular: heart normal, normal rate and regular rhythm   Pulmonary/Chest Wall: effort normal   Abdominal: appearance normal     Patient Active Problem List   Diagnosis    Secondary hyperparathyroidism of renal origin (HCC)    Impaired mobility and ADLs    Right hemiparesis (HCC)    Current use of aspirin    Obstructive sleep apnea on CPAP    Obesity, Class I, BMI 30-34.9    Dysarthria    CKD (chronic kidney disease) stage 2, GFR 60-89 ml/min    Hypertensive  heart and kidney disease without heart failure and with stage 2 chronic kidney disease    Pure hypercholesterolemia    Hypokalemia    Elevated prostate specific antigen (PSA)    Refusal of statin medication by patient    First 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    Vitamin B12 deficiency anemia    Primary osteoarthritis of right ankle    Type 2 diabetes mellitus with hyperglycemia, without long-term current use of insulin (HCC)    Received intravenous tissue plasminogen activator (tPA) in emergency department    Proteinuria    Atrial fibrillation (HCC)    Acute renal failure superimposed on stage 3b chronic kidney disease (HCC)    Gout    Pericardial effusion    Acute on chronic diastolic heart failure (HCC)    History of CVA (cerebrovascular accident)    Nephrotic syndrome    Edema    Skin tear of forearm without complication, sequela    Benign essential HTN    Acute on chronic anemia    Palliative care encounter    ACP (advance care planning)    Goals of care, counseling/discussion    DNR (do not resuscitate) discussion    Acute decompensated heart failure (HCC)    Gastrointestinal hemorrhage with melena         BP (!) 154/69   Pulse 53   Temp 98.4 °F (36.9 °C) (Oral)   Resp 21   Ht 1.753 m (5' 9\")   Wt 104.3 kg (230 lb)   SpO2 98%   BMI 33.97 kg/m²         Intake/Output Summary (Last 24 hours) at 4/9/2024 1316  Last data filed at 4/9/2024 0300  Gross per 24 hour   Intake 770.08 ml   Output 1000 ml   Net -229.92 ml       CBC w/Diff    Lab Results   Component Value Date/Time    WBC 11.4 04/09/2024 03:45 AM    RBC 2.95 (L) 04/09/2024 03:45 AM    HGB 7.6 (L) 04/09/2024 03:45 AM    HCT 24.8 (L) 04/09/2024 03:45 AM    MCV 84.1 04/09/2024 03:45 AM    MCH 25.8 04/09/2024 03:45 AM

## 2024-04-09 NOTE — PROGRESS NOTES
[facility] Hospital[facility]  Progress Note    Patient: Amish Shetty Age: 68 y.o. : 1955 MR#: [id number] SSN: [id number]  Date/Time: 2024    Subjective:     Pt seen w/ nursing and respiratory tech. He states his breathing is better    Assessment/Plan:   68 y o male with multiple medical conditions including anemia w/ multiple work ups in the past w/o clear diagnosis admitted after he presented w/ c/o sob    -Acute on chronic iron def anemia: no Gi source identified w/ previous work ups. S/p PRBC transfusion now at 7.6. Fecal occult testing +ve this admission per report. GI input noted. Plans for endoscopy noted  -Hypokalemia: likely due to renal loss due to lasix, normal mag  -Right upper ext edema: chronic  -Anasarca/?acute heart failure    HISTORY OF:  -HTN  -DM2  -CVA: right hemiplegia  -Afib not on OAC  -Depression  -CKD/proteinuria/amyloidosis      PLAN:  -Follow H+H and transfuse  -Replace and follow K  -Endoscopy tomorrow  -PT/OT  -will hold off on diuresis at this time and will resume after repletion of his K  -Right upper ext elevation (discussed with nursing)        Additional Notes:      Case discussed with:  [x]Patient  []Family  []Nursing  []Case Management  DVT Prophylaxis:  []Lovenox  []Hep SQ  [x]SCDs  []Coumadin   []On Heparin gtt    Objective:   VS: BP (!) 176/74   Pulse 60   Temp 98.3 °F (36.8 °C) (Oral)   Resp 24   Ht 1.753 m (5' 9\")   Wt 104.3 kg (230 lb)   SpO2 94%   BMI 33.97 kg/m²    Tmax/24hrs: Temp (24hrs), Av.2 °F (36.8 °C), Min:98 °F (36.7 °C), Max:98.4 °F (36.9 °C)    Input/Output:   Intake/Output Summary (Last 24 hours) at 2024 1424  Last data filed at 2024 0300  Gross per 24 hour   Intake 770.08 ml   Output 1000 ml   Net -229.92 ml       General: alert, awake, in NAD  HEENT: NCAT, sclerae anicteric,  mmm, neck supple  COR: rrr, no murmurs  PULM: CTAB  ABD: soft, nt, nd  EXT: + edema, right upper ext worse than the rest of his    POCT Glucose    Collection Time: 04/09/24  9:45 AM   Result Value Ref Range    POC Glucose 87 70 - 110 mg/dL   Potassium    Collection Time: 04/09/24 12:08 PM   Result Value Ref Range    Potassium 2.7 (LL) 3.5 - 5.5 mmol/L   POCT Glucose    Collection Time: 04/09/24 12:46 PM   Result Value Ref Range    POC Glucose 131 (H) 70 - 110 mg/dL     Additional Data Reviewed:      Signed By: Cameron Gonzalez MD     April 9, 2024 2:24 PM

## 2024-04-09 NOTE — ED NOTES
Spoke with Dr. Gonzalez regarding pt PO meds. MD spoke with GI and due to hypokalemia, EGD will be r/s to tomorrow. OK to proceed with oral meds. Recheck K+ 1 hour post last IV potassium infusion.

## 2024-04-09 NOTE — ED NOTES
TRANSFER - OUT REPORT:    Verbal report given to CVT SD on Amish Shetty  being transferred to 2303 for routine progression of patient care       Report consisted of patient's Situation, Background, Assessment and   Recommendations(SBAR).     Information from the following report(s) ED Encounter Summary was reviewed with the receiving nurse.    Juan Fall Assessment:    Presents to emergency department  because of falls (Syncope, seizure, or loss of consciousness): No  Age > 70: No  Altered Mental Status, Intoxication with alcohol or substance confusion (Disorientation, impaired judgment, poor safety awaremess, or inability to follow instructions): No  Impaired Mobility: Ambulates or transfers with assistive devices or assistance; Unable to ambulate or transer.: Yes  Nursing Judgement: Yes          Lines:   Peripheral IV 04/08/24 Left Antecubital (Active)   Site Assessment Clean, dry & intact 04/08/24 2000   Line Status Flushed;Capped 04/08/24 2000   Line Care Connections checked and tightened 04/08/24 2000   Phlebitis Assessment No symptoms 04/08/24 2000   Infiltration Assessment 0 04/08/24 2000   Alcohol Cap Used Yes 04/08/24 2000   Dressing Status Clean, dry & intact 04/08/24 2000   Dressing Type Transparent 04/08/24 2000       Peripheral IV 04/08/24 Left;Anterior Forearm (Active)   Site Assessment Clean, dry & intact 04/08/24 2000   Line Status Infusing 04/08/24 2000   Line Care Connections checked and tightened 04/08/24 2000   Phlebitis Assessment No symptoms 04/08/24 2000   Infiltration Assessment 0 04/08/24 2000   Alcohol Cap Used Yes 04/08/24 2000   Dressing Status Clean, dry & intact 04/08/24 2000   Dressing Type Transparent 04/08/24 2000        Opportunity for questions and clarification was provided.      Patient transported with:  O2 @ 4lpm, Monitor, RN

## 2024-04-10 LAB
ANION GAP SERPL CALC-SCNC: 5 MMOL/L (ref 3–18)
BUN SERPL-MCNC: 58 MG/DL (ref 7–18)
BUN/CREAT SERPL: 19 (ref 12–20)
CALCIUM SERPL-MCNC: 7.7 MG/DL (ref 8.5–10.1)
CHLORIDE SERPL-SCNC: 107 MMOL/L (ref 100–111)
CO2 SERPL-SCNC: 28 MMOL/L (ref 21–32)
CREAT SERPL-MCNC: 3.1 MG/DL (ref 0.6–1.3)
ERYTHROCYTE [DISTWIDTH] IN BLOOD BY AUTOMATED COUNT: 17.7 % (ref 11.6–14.5)
GLUCOSE BLD STRIP.AUTO-MCNC: 119 MG/DL (ref 70–110)
GLUCOSE BLD STRIP.AUTO-MCNC: 130 MG/DL (ref 70–110)
GLUCOSE BLD STRIP.AUTO-MCNC: 143 MG/DL (ref 70–110)
GLUCOSE BLD STRIP.AUTO-MCNC: 143 MG/DL (ref 70–110)
GLUCOSE SERPL-MCNC: 122 MG/DL (ref 74–99)
HCT VFR BLD AUTO: 24.9 % (ref 36–48)
HGB BLD-MCNC: 7.8 G/DL (ref 13–16)
MAGNESIUM SERPL-MCNC: 2 MG/DL (ref 1.6–2.6)
MCH RBC QN AUTO: 26.8 PG (ref 24–34)
MCHC RBC AUTO-ENTMCNC: 31.3 G/DL (ref 31–37)
MCV RBC AUTO: 85.6 FL (ref 78–100)
NRBC # BLD: 0 K/UL (ref 0–0.01)
NRBC BLD-RTO: 0 PER 100 WBC
PLATELET # BLD AUTO: 316 K/UL (ref 135–420)
PMV BLD AUTO: 10.1 FL (ref 9.2–11.8)
POTASSIUM SERPL-SCNC: 3.4 MMOL/L (ref 3.5–5.5)
RBC # BLD AUTO: 2.91 M/UL (ref 4.35–5.65)
SODIUM SERPL-SCNC: 140 MMOL/L (ref 136–145)
WBC # BLD AUTO: 16.8 K/UL (ref 4.6–13.2)

## 2024-04-10 PROCEDURE — 6370000000 HC RX 637 (ALT 250 FOR IP): Performed by: PHYSICIAN ASSISTANT

## 2024-04-10 PROCEDURE — 99232 SBSQ HOSP IP/OBS MODERATE 35: CPT | Performed by: INTERNAL MEDICINE

## 2024-04-10 PROCEDURE — 6370000000 HC RX 637 (ALT 250 FOR IP): Performed by: INTERNAL MEDICINE

## 2024-04-10 PROCEDURE — 6360000002 HC RX W HCPCS: Performed by: INTERNAL MEDICINE

## 2024-04-10 PROCEDURE — 80048 BASIC METABOLIC PNL TOTAL CA: CPT

## 2024-04-10 PROCEDURE — 83735 ASSAY OF MAGNESIUM: CPT

## 2024-04-10 PROCEDURE — 82962 GLUCOSE BLOOD TEST: CPT

## 2024-04-10 PROCEDURE — 6370000000 HC RX 637 (ALT 250 FOR IP): Performed by: STUDENT IN AN ORGANIZED HEALTH CARE EDUCATION/TRAINING PROGRAM

## 2024-04-10 PROCEDURE — 99223 1ST HOSP IP/OBS HIGH 75: CPT | Performed by: INTERNAL MEDICINE

## 2024-04-10 PROCEDURE — 85027 COMPLETE CBC AUTOMATED: CPT

## 2024-04-10 PROCEDURE — 2140000001 HC CVICU INTERMEDIATE R&B

## 2024-04-10 RX ORDER — POTASSIUM CHLORIDE 20 MEQ/1
20 TABLET, EXTENDED RELEASE ORAL 3 TIMES DAILY
Status: DISCONTINUED | OUTPATIENT
Start: 2024-04-10 | End: 2024-04-17 | Stop reason: HOSPADM

## 2024-04-10 RX ORDER — BUMETANIDE 0.25 MG/ML
1 INJECTION INTRAMUSCULAR; INTRAVENOUS 2 TIMES DAILY
Status: DISCONTINUED | OUTPATIENT
Start: 2024-04-10 | End: 2024-04-12

## 2024-04-10 RX ORDER — HYDRALAZINE HYDROCHLORIDE 50 MG/1
100 TABLET, FILM COATED ORAL EVERY 8 HOURS SCHEDULED
Status: DISCONTINUED | OUTPATIENT
Start: 2024-04-10 | End: 2024-04-17 | Stop reason: HOSPADM

## 2024-04-10 RX ORDER — ISOSORBIDE DINITRATE 10 MG/1
20 TABLET ORAL 3 TIMES DAILY
Status: DISCONTINUED | OUTPATIENT
Start: 2024-04-10 | End: 2024-04-14

## 2024-04-10 RX ORDER — HYDRALAZINE HYDROCHLORIDE 50 MG/1
100 TABLET, FILM COATED ORAL EVERY 12 HOURS SCHEDULED
Status: DISCONTINUED | OUTPATIENT
Start: 2024-04-10 | End: 2024-04-10

## 2024-04-10 RX ORDER — FUROSEMIDE 10 MG/ML
40 INJECTION INTRAMUSCULAR; INTRAVENOUS 2 TIMES DAILY
Status: DISCONTINUED | OUTPATIENT
Start: 2024-04-10 | End: 2024-04-10

## 2024-04-10 RX ADMIN — ATORVASTATIN CALCIUM 40 MG: 40 TABLET, FILM COATED ORAL at 20:56

## 2024-04-10 RX ADMIN — HYDRALAZINE HYDROCHLORIDE 100 MG: 50 TABLET ORAL at 20:56

## 2024-04-10 RX ADMIN — ISOSORBIDE DINITRATE 20 MG: 10 TABLET ORAL at 16:43

## 2024-04-10 RX ADMIN — FUROSEMIDE 40 MG: 10 INJECTION, SOLUTION INTRAMUSCULAR; INTRAVENOUS at 13:19

## 2024-04-10 RX ADMIN — POTASSIUM CHLORIDE 20 MEQ: 1500 TABLET, EXTENDED RELEASE ORAL at 20:56

## 2024-04-10 RX ADMIN — HYDRALAZINE HYDROCHLORIDE 100 MG: 50 TABLET ORAL at 08:05

## 2024-04-10 RX ADMIN — FINASTERIDE 5 MG: 5 TABLET, FILM COATED ORAL at 20:56

## 2024-04-10 RX ADMIN — HYDRALAZINE HYDROCHLORIDE 100 MG: 50 TABLET, FILM COATED ORAL at 09:18

## 2024-04-10 RX ADMIN — POTASSIUM CHLORIDE 20 MEQ: 1500 TABLET, EXTENDED RELEASE ORAL at 16:43

## 2024-04-10 RX ADMIN — AMLODIPINE BESYLATE 10 MG: 10 TABLET ORAL at 13:19

## 2024-04-10 RX ADMIN — BUMETANIDE 1 MG: 0.25 INJECTION INTRAMUSCULAR; INTRAVENOUS at 16:46

## 2024-04-10 RX ADMIN — HYDRALAZINE HYDROCHLORIDE 100 MG: 50 TABLET ORAL at 13:40

## 2024-04-10 RX ADMIN — POTASSIUM CHLORIDE 40 MEQ: 1500 TABLET, EXTENDED RELEASE ORAL at 20:56

## 2024-04-10 RX ADMIN — SERTRALINE HYDROCHLORIDE 50 MG: 50 TABLET ORAL at 08:05

## 2024-04-10 RX ADMIN — ISOSORBIDE DINITRATE 20 MG: 10 TABLET ORAL at 20:56

## 2024-04-10 ASSESSMENT — PAIN SCALES - GENERAL
PAINLEVEL_OUTOF10: 0

## 2024-04-10 ASSESSMENT — PAIN - FUNCTIONAL ASSESSMENT: PAIN_FUNCTIONAL_ASSESSMENT: 0-10

## 2024-04-10 NOTE — PROGRESS NOTES
[facility] Hospitalist Group  Progress Note    Patient: Amish Shetty Age: 68 y.o. : 1955 MR#: [id number] SSN: [id number]  Date/Time: 4/10/2024    Subjective:     Pt seen w/ nursing and respiratory tech. He states his breathing is better    Assessment/Plan:   68 y o male with multiple medical conditions including anemia w/ multiple work ups in the past w/o clear diagnosis admitted after he presented w/ c/o sob    -Acute on chronic iron def anemia: no Gi source identified w/ previous work ups. S/p PRBC transfusion now at 7.6. Fecal occult testing +ve this admission per report. Endoscopy cancelled due to orthopnea  -Hypokalemia: likely due to renal loss due to lasix, normal mag  -Right upper ext edema: chronic  -Acute HFpEF: diuretics  had been held due to recalcitrant hypokalemia, will resume. Will consult cardiology as unable to perform endoscopy due to orthopnea, and will need better volume management.     HISTORY OF:  -HTN  -DM2  -CVA: right hemiplegia  -Afib not on OAC  -Depression  -CKD/proteinuria/amyloidosis      PLAN:  -Resume diuresis while monitoring K and renal function  -Follow H+H and transfuse  -Replace and follow K  -Endoscopy after diuresis, cardiology consulted  -PT/OT    Spoke to nurse about obtaining labs for today      Additional Notes:      Case discussed with:  [x]Patient  []Family  [x]Nursing  [x]Case Management  DVT Prophylaxis:  []Lovenox  []Hep SQ  [x]SCDs  []Coumadin   []On Heparin gtt    Objective:   VS: BP (!) 191/88   Pulse 88   Temp 98.8 °F (37.1 °C) (Oral)   Resp 19   Ht 1.753 m (5' 9\")   Wt 99.5 kg (219 lb 4.8 oz)   SpO2 96%   BMI 32.38 kg/m²    Tmax/24hrs: Temp (24hrs), Av.5 °F (36.9 °C), Min:98 °F (36.7 °C), Max:99 °F (37.2 °C)    Input/Output:   Intake/Output Summary (Last 24 hours) at 4/10/2024 1224  Last data filed at 4/10/2024 1028  Gross per 24 hour   Intake 120 ml   Output 1200 ml   Net -1080 ml       General: alert, awake, in  Range    Magnesium 2.3 1.6 - 2.6 mg/dL   POCT Glucose    Collection Time: 04/10/24  7:55 AM   Result Value Ref Range    POC Glucose 119 (H) 70 - 110 mg/dL   POCT Glucose    Collection Time: 04/10/24 11:41 AM   Result Value Ref Range    POC Glucose 130 (H) 70 - 110 mg/dL     Additional Data Reviewed:      Signed By: Cameron Gonzalez MD     April 10, 2024 12:24 PM

## 2024-04-10 NOTE — PROGRESS NOTES
Pt with 19 beats ov VT, he is asymptomatic, V lead noted to be on pt's gown, lead replaced with new electrode,.potassium 3.2, replaced with night time meds, will continue to monitor.    Dr. Whitman informed of VT episode, potassium lab work ordered.

## 2024-04-10 NOTE — PERIOP NOTE
TRANSFER - OUT REPORT:    Verbal report given to Ann Marie GONGORA on Amish Shetty  being transferred to [facility] for routine progression of patient care       Report consisted of patient's Situation, Background, Assessment and   Recommendations(SBAR).     Information from the following report(s) Nurse Handoff Report was reviewed with the receiving nurse.           Lines:   Peripheral IV 04/08/24 Left Antecubital (Active)   Site Assessment Clean, dry & intact 04/10/24 0046   Line Status Flushed;Capped 04/09/24 1734   Line Care Connections checked and tightened 04/09/24 1734   Phlebitis Assessment No symptoms 04/09/24 1734   Infiltration Assessment 0 04/09/24 1734   Alcohol Cap Used Yes 04/09/24 1734   Dressing Status Clean, dry & intact 04/09/24 1734   Dressing Type Transparent 04/09/24 1734       Peripheral IV 04/08/24 Left;Anterior Forearm (Active)   Site Assessment Clean, dry & intact 04/09/24 1734   Line Status Infusing 04/09/24 1734   Line Care Connections checked and tightened 04/09/24 1734   Phlebitis Assessment No symptoms 04/09/24 1734   Infiltration Assessment 0 04/09/24 1734   Alcohol Cap Used Yes 04/09/24 1734   Dressing Status Clean, dry & intact 04/09/24 1734   Dressing Type Transparent 04/09/24 1734        Opportunity for questions and clarification was provided.      Patient transported with:  O2 @ 2lpm by hospital transport

## 2024-04-10 NOTE — CARE COORDINATION
04/10/24 1236   Readmission Assessment   Number of Days since last admission? 8-30 days   Previous Disposition Home with Home Health   Who is being Interviewed Patient  (per pt, he was short of breath at home and had to come to ED)   What was the patient's/caregiver's perception as to why they think they needed to return back to the hospital? Other (Comment)   Did you visit your Primary Care Physician after you left the hospital, before you returned this time? No   Why weren't you able to visit your PCP? Other (Comment)  (per pt, his pcp appointment was monday at 1045am but he had to come to the ED instead because of SOB)   Did you see a specialist, such as Cardiac, Pulmonary, Orthopedic Physician, etc. after you left the hospital? No   Who advised the patient to return to the hospital? Self-referral   Does the patient report anything that got in the way of taking their medications? No  (per pt, he was able to  all his prescription medications)   In our efforts to provide the best possible care to you and others like you, can you think of anything that we could have done to help you after you left the hospital the first time, so that you might not have needed to return so soon? Teaching during hospitalization regarding your illness;Additional Community resources available for illness support           LIANA Ortiz RN  Care Management

## 2024-04-10 NOTE — PROGRESS NOTES
1003: PT orders received and chart reviewed. Headed off the floor for EGD per FRANSISCA Jesus. Will follow up.   1535: 2nd PT attempt. Sleeping soundly. Unable to arouse sufficiently for participation in skilled PT treatment.

## 2024-04-10 NOTE — PROGRESS NOTES
Two phlebectomy attempt blood work on pt, unsuccessful, RN attempted, unsuccessful. Dr Whitman, informed, RN to f/u with day team

## 2024-04-10 NOTE — CARE COORDINATION
04/10/24 1156   IMM Letter   IMM Letter given to Patient/Family/Significant other/Guardian/POA/by: Merari Rankin   IMM Letter date given: 04/10/24   IMM Letter time given: 1149             Medicare pt has received, reviewed, and signed 2nd IM letter informing them of their right to appeal the discharge.  Signed copy has been placed on pt bedside chart.          LIANA Ortiz RN  Care Management

## 2024-04-10 NOTE — PROGRESS NOTES
[email or web address]  [phone]    Gastroenterology follow up-Progress note    Impression:  1. Acute on chronic ADELINE   - H/H currently 7.6/24.8 after 2 units PRBC  - Ferritin 171, Iron 24, iron sat 9%  - Occult stool positive  - Hgb 5.9 upon admission w/ baseline in 7-9 range. FOB in March on last admission with same H/H. 3/17 NM scan neg. No overt GI bleeding, abd pain, or n/v.   - [facility]ed by [facility] as OP w/ dx of ADELINE and anemia of chronic disease; received IV iron last 6/2023 but should be on PO iron now, also on q8wk darbepoeitin per 12/2023 [facility] note.   - GI w/u of chronic ADELINE includes EGD-[place] in 2019 w/o significant findings. Pill cam study in 2021 revealed possible old blood in small bowel and possible ulcerations in colon prompting repeat colo in 2021 which revealed only TA polyps w/ 5yr recall   2. CHF  3. A fib - not on anticoagulation other than 81 mg ASA  4. CKD   5. HTN  6. CVA (2019)  7. Hypokalemia - up to 2.7 after 40 mEq IV infused    Plan:  1. Monitor H/H and transfuse as per protocol  2. Check ferritin/iron profile (ordered) and replace as indicated  3. Regular diet now then NPO p MN for EGD moved to tomorrow due respiratory concerns with fluid overload      Chief Complaint: symptomatic anemia with SOB      Subjective:  No abdominal pain, reflux, hematemesis, melena, or hematochezia.     ROS: Denies any fevers, chills, rash.     Eyes: conjunctiva normal, EOM normal   Neck: ROM normal   Cardiovascular: heart normal, normal rate and regular rhythm   Pulmonary/Chest Wall: effort normal   Abdominal: appearance normal     Patient Active Problem List   Diagnosis    Secondary hyperparathyroidism of renal origin (HCC)    Impaired mobility and ADLs    Right hemiparesis (HCC)    Current use of aspirin    Obstructive sleep apnea on CPAP    Obesity, Class I, BMI 30-34.9    Dysarthria    CKD (chronic kidney disease) stage 2, GFR 60-89 ml/min    Hypertensive heart and kidney disease without heart failure and with

## 2024-04-10 NOTE — CONSULTS
[facility] - Consult Note    Cardiology consultation request from Dr. Gonzalez for evaluation and management/treatment of CHF exacerbation     Date of  Admission: 4/8/2024  8:47 AM   Primary Care Physician:  Caleb Hurst MD    Attending Cardiologist: Dr. Chuck Ramírez      Assessment:     -Dyspnea, multifactorial in setting of below.   -Anemia/possible GIB, +FOBT, Hgb 5.9 on admission, s/p transfusion. EGD cancelled d/t orthopnea.   -A/c HFpEF exacerbation, SOB, orthopnea, elevated BNP. CXR with congestion.   Echo 01/31/24, LVEF 50-55%.   -Moderate pericardial effusion without tamponade TTE 01/2024 (1-2 cm).   -SSS/Persistent Afib with SVR, asymptomatic, historically limiting rate controlling agents. Patient previously declined to be on anticoagulation as outpatient. On ASA only.   -HTN, uncontrolled.   -DM2  -CHIOMA, uses cpap.   -Hx CVA with residual right sided deficits   -ALECT2 Renal Amyloidosis.   -Proteinuria   -Obesity     Primary cardiologist, Dr. Chuck Ramírez       Plan:       I saw, evaluated, interviewed and examined the patient personally.  Patient with worsening dyspnea over the last 1 month and edema.  History of renal amyloidosis  No chest pain or chest tightness  Patient with both upper and lower extremity edema, soft abdomen.  No obvious murmur.  Decreased breath sounds with some rales  Pertinent labs reviewed.  EKG reviewed    Recommendation:  -Respiratory distress likely HFpEF as well as anemia  -Agree with IV Lasix.  -Agree with increasing dose of hydralazine as mentioned below  -Start Isordil for better blood pressure control    Significant time spent in reviewing the case, multiple EMR databases, physician notes, reviewing pertinent labs and imaging studies  I spent significant amount of time for medical decision making and updated history, and other providers assessments as well.  I personally agree with the findings as stated and the plan as documented.  I saw, examined, and  Social Isolation     Frequency of experiencing loneliness or isolation: Never   Housing Stability: Low Risk  (4/9/2024)    Housing Stability Vital Sign     Unable to Pay for Housing in the Last Year: No     Number of Places Lived in the Last Year: 1     Unstable Housing in the Last Year: No        Family History:     Family History   Problem Relation Age of Onset    Prostate Cancer Father     Heart Disease Father     Cancer Mother         Gastric cancer        Medications:   No Known Allergies     Current Facility-Administered Medications   Medication Dose Route Frequency    hydrALAZINE (APRESOLINE) tablet 100 mg  100 mg Oral 2 times per day    furosemide (LASIX) injection 40 mg  40 mg IntraVENous BID    potassium chloride (KLOR-CON M) extended release tablet 40 mEq  40 mEq Oral PRN    Or    potassium bicarb-citric acid (EFFER-K) effervescent tablet 40 mEq  40 mEq Oral PRN    Or    potassium chloride 10 mEq/100 mL IVPB (Peripheral Line)  10 mEq IntraVENous PRN    acetaminophen (TYLENOL) tablet 650 mg  650 mg Oral Q6H PRN    0.9 % sodium chloride infusion   IntraVENous PRN    amLODIPine (NORVASC) tablet 10 mg  10 mg Oral Daily    atorvastatin (LIPITOR) tablet 40 mg  40 mg Oral Daily    finasteride (PROSCAR) tablet 5 mg  5 mg Oral Daily    sertraline (ZOLOFT) tablet 50 mg  50 mg Oral Daily    glucose chewable tablet 16 g  4 tablet Oral PRN    dextrose bolus 10% 125 mL  125 mL IntraVENous PRN    Or    dextrose bolus 10% 250 mL  250 mL IntraVENous PRN    glucagon (rDNA) injection 1 mg  1 mg SubCUTAneous PRN    dextrose 10 % infusion   IntraVENous Continuous PRN    insulin lispro (HUMALOG) injection vial 0-4 Units  0-4 Units SubCUTAneous TID WC    insulin lispro (HUMALOG) injection vial 0-4 Units  0-4 Units SubCUTAneous Nightly         Physical Exam:     Vitals:    04/10/24 1144   BP: (!) 191/88   Pulse: 88   Resp: 19   Temp: 98.8 °F (37.1 °C)   SpO2: 96%       TELE: AFIB    BP Readings from Last 3 Encounters:   04/10/24

## 2024-04-10 NOTE — PROGRESS NOTES
1920- Bedside and Verbal shift change report given to Mele RN (oncoming nurse) by Ann Marie RN (offgoing nurse). Report included the following information Nurse Handoff Report, Adult Overview, Intake/Output, MAR, Recent Results, Cardiac Rhythm Afib 60-70's w PVCs, and Neuro Assessment.     1930- Pt in bed with eyes closed and even chest rise and fall. No signs of distress.      34.2

## 2024-04-10 NOTE — PROGRESS NOTES
1331- Pt arrived on floor from ED in the care of nursing staff. Pt vitals stable, two man skin assessment performed. Of note pt has significant generalized edema, vascular discoloration to the right lower arm and blanchable redness to his sacrum. A mepilex was placed on the sacrum. Pt heel floated. Pt on monitor [id number].     1345-Per GI pt will have EGD done tomorrow if his K+ is corrected.  Pt is able to eat but will be NPO at midnight.  Pt did relay to GI that yesterday he has a 3 hr nose bleed and may have swallowed some blood during it as he was pinching his nose and tilting his head back.      1552- Pt up to beside commode by max two man assist to stand and pivot.     1630- Pt completed six runs of K+ IV. Potassium level is 2.8. Dr. Gonzalez gave orders for 40 MEQ of oral K+ now and a further 40 MEQ later at 2100. Repeat potassium lab ordered for 2200.       1730- Fecal occult sample sent off. End result positive for blood in stool.     1950- Bedside and Verbal shift change report given to Anne RN (oncoming nurse) by Mele RN (offgoing nurse). Report included the following information Nurse Handoff Report, Adult Overview, Intake/Output, MAR, Recent Results, Cardiac Rhythm Slow Afib in the 50s, and Neuro Assessment.     1940- Paged Dr. Gonzalez for order for pts home bipap.     2014- Dr. Whitman paged Pt has home bipap at bedside and needs orders to RT to help him set it up.     2023- Bipap orders in.

## 2024-04-10 NOTE — CARE COORDINATION
04/10/24 1201   Service Assessment   Patient Orientation Alert and Oriented;Person;Place;Situation   Cognition Alert   History Provided By Patient   Primary Caregiver Self   Support Systems Spouse/Significant Other;Family Members;Children   Patient's Healthcare Decision Maker is: Named in Scanned ACP Document   PCP Verified by CM Yes   Last Visit to PCP Within last 3 months   Prior Functional Level Assistance with the following:;Bathing;Dressing;Toileting;Mobility;Cooking   Current Functional Level Assistance with the following:;Bathing;Dressing;Toileting;Mobility   Can patient return to prior living arrangement Yes   Ability to make needs known: Good   Family able to assist with home care needs: Yes   Would you like for me to discuss the discharge plan with any other family members/significant others, and if so, who? Yes  (pt stated wife is not available at this time because she is at therapy)   Financial Resources Medicaid;Other (Comment)  (BCBS)   Community Resources ECF/Home Care   Social/Functional History   Lives With Spouse   Type of Home House   Home Layout One level   Home Access Stairs to enter without rails   Entrance Stairs - Number of Steps 1   Bathroom Shower/Tub Tub/Shower unit   Bathroom Toilet Standard   Bathroom Equipment Grab bars in shower   Bathroom Accessibility Accessible   Home Equipment Cane;Oxygen  (CPAP)   Receives Help From Family   ADL Assistance Needs assistance   Ambulation Assistance Needs assistance   Transfer Assistance Needs assistance   Active  No   Mode of Transportation Car   Education spouse drives to appointments   Occupation Retired   Discharge Planning   Type of Residence House   Living Arrangements Spouse/Significant Other   Current Services Prior To Admission C-pap;Home Care;Oxygen Therapy   Potential Assistance Needed Home Care   DME Ordered? No   Potential Assistance Purchasing Medications No   Type of Home Care Services OT;PT;Nursing Services   Patient expects  to be discharged to: House   Services At/After Discharge   Transition of Care Consult (CM Consult) Home Health   Internal Home Health Yes   Services At/After Discharge Home Health   Mode of Transport at Discharge Other (see comment)  (pt's spouse)   Confirm Follow Up Transport Family   Condition of Participation: Discharge Planning   The Plan for Transition of Care is related to the following treatment goals: Patient wants to return home and continue therapy [facility]   The Patient and/or Patient Representative was provided with a Choice of Provider? Patient   The Patient and/Or Patient Representative agree with the Discharge Plan? Yes   Freedom of Choice list was provided with basic dialogue that supports the patient's individualized plan of care/goals, treatment preferences, and shares the quality data associated with the providers?  Yes  (FOC for [facility])           Case Management Assessment  Initial Evaluation    Date/Time of Evaluation: 4/10/2024 12:33 PM  Assessment Completed by: Merari Rankin RN    If patient is discharged prior to next notation, then this note serves as note for discharge by case management.    Patient Name: Amish Shetty                   YOB: 1955  Diagnosis: Acute decompensated heart failure (HCC) [I50.9]                   Date / Time: 4/8/2024  8:47 AM    Patient Admission Status: Inpatient   Readmission Risk (Low < 19, Mod (19-27), High > 27): Readmission Risk Score: 22.5    Current PCP: Caleb Hurst MD  PCP verified by CM? (P) Yes    Chart Reviewed: Yes      History Provided by: (P) Patient  Patient Orientation: (P) Alert and Oriented, Person, Place, Situation    Patient Cognition: (P) Alert    Hospitalization in the last 30 days (Readmission):  Yes    If yes, Readmission Assessment in CM Navigator will be completed.    Advance Directives:      Code Status: DNR   Patient's Primary Decision Maker is: (P) Named in Scanned ACP Document

## 2024-04-10 NOTE — PLAN OF CARE
Problem: Safety - Adult  Goal: Free from fall injury  Outcome: Progressing     Problem: Pain  Goal: Verbalizes/displays adequate comfort level or baseline comfort level  Outcome: Progressing     Problem: ABCDS Injury Assessment  Goal: Absence of physical injury  Outcome: Progressing     Problem: Pain  Goal: Verbalizes/displays adequate comfort level or baseline comfort level  Outcome: Progressing     Problem: Discharge Planning  Goal: Discharge to home or other facility with appropriate resources  Outcome: Progressing

## 2024-04-10 NOTE — CONSULTS
Consult Note  Consult requested by: dr cheryl Wellington Diogenes is a 68 y.o. 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,dm,htn,sleep apnea,admitted with chf and anemia  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     On Allopurinol    Chronic hypokalemia     Persistent chronic hypokalemia + hypertension; ?primary hyperaldosteronism    CKD (chronic kidney disease)     Hypertension     Nephrotic syndrome     Obesity, Class I, BMI 30-34.9     Obstructive sleep apnea on CPAP     Pure hypercholesterolemia     Received intravenous tissue plasminogen activator (tPA) in emergency department 8/12/2019    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HCC)     ALECT2    Secondary hyperparathyroidism of renal origin (HCC) 8/18/2019    PTH (8/18/2019) = 101.7    Type 2 diabetes mellitus with stage 2 chronic kidney disease (HCC)     HbA1c (8/13/2019) = 6.6 no meds    Vitamin B12 deficiency anemia 8/14/2019    Vitamin B12 (8/14/2019) = 208    Vitamin D deficiency 8/19/2019    Vitamin D 25-Hydroxy (8/19/2019) = 16.2       Past Surgical History:   Procedure Laterality Date    COLONOSCOPY N/A 4/15/2019    COLONOSCOPY performed by Héctor Piedra MD at [facility] ENDOSCOPY    COLONOSCOPY N/A 12/20/2021    COLONOSCOPY with polypectomies performed by Héctor Piedra MD at [facility] ENDOSCOPY    CT BIOPSY RENAL  11/2/2023    CT BIOPSY RENAL 11/2/2023 [facility] RAD CT    TONSILLECTOMY         Social History     Socioeconomic History    Marital status:      Spouse name: Not

## 2024-04-11 ENCOUNTER — APPOINTMENT (OUTPATIENT)
Facility: HOSPITAL | Age: 69
DRG: 291 | End: 2024-04-11
Payer: MEDICARE

## 2024-04-11 PROBLEM — D62 ACUTE BLOOD LOSS ANEMIA: Status: ACTIVE | Noted: 2024-04-11

## 2024-04-11 PROBLEM — I50.31 ACUTE HEART FAILURE WITH PRESERVED EJECTION FRACTION (HCC): Status: ACTIVE | Noted: 2024-04-08

## 2024-04-11 LAB
ANION GAP SERPL CALC-SCNC: 6 MMOL/L (ref 3–18)
APPEARANCE UR: CLEAR
B PERT DNA SPEC QL NAA+PROBE: NOT DETECTED
BACTERIA URNS QL MICRO: NEGATIVE /HPF
BILIRUB UR QL: NEGATIVE
BORDETELLA PARAPERTUSSIS BY PCR: NOT DETECTED
BUN SERPL-MCNC: 60 MG/DL (ref 7–18)
BUN/CREAT SERPL: 19 (ref 12–20)
C PNEUM DNA SPEC QL NAA+PROBE: NOT DETECTED
CALCIUM SERPL-MCNC: 7.6 MG/DL (ref 8.5–10.1)
CHLORIDE SERPL-SCNC: 109 MMOL/L (ref 100–111)
CO2 SERPL-SCNC: 27 MMOL/L (ref 21–32)
COLOR UR: YELLOW
CREAT SERPL-MCNC: 3.11 MG/DL (ref 0.6–1.3)
ECHO AO ROOT DIAM: 3.7 CM
ECHO AO ROOT INDEX: 1.7 CM/M2
ECHO BSA: 2.24 M2
ECHO LA DIAMETER INDEX: 2.25 CM/M2
ECHO LA DIAMETER: 4.9 CM
ECHO LA TO AORTIC ROOT RATIO: 1.32
ECHO LA VOL A-L A2C: 134 ML (ref 18–58)
ECHO LA VOL A-L A4C: 124 ML (ref 18–58)
ECHO LA VOL BP: 125 ML (ref 18–58)
ECHO LA VOL MOD A2C: 127 ML (ref 18–58)
ECHO LA VOL MOD A4C: 118 ML (ref 18–58)
ECHO LA VOL/BSA BIPLANE: 57 ML/M2 (ref 16–34)
ECHO LA VOLUME AREA LENGTH: 132 ML
ECHO LA VOLUME INDEX A-L A2C: 61 ML/M2 (ref 16–34)
ECHO LA VOLUME INDEX A-L A4C: 57 ML/M2 (ref 16–34)
ECHO LA VOLUME INDEX AREA LENGTH: 61 ML/M2 (ref 16–34)
ECHO LA VOLUME INDEX MOD A2C: 58 ML/M2 (ref 16–34)
ECHO LA VOLUME INDEX MOD A4C: 54 ML/M2 (ref 16–34)
ECHO LV FRACTIONAL SHORTENING: 35 % (ref 28–44)
ECHO LV INTERNAL DIMENSION DIASTOLE INDEX: 2.34 CM/M2
ECHO LV INTERNAL DIMENSION DIASTOLIC: 5.1 CM (ref 4.2–5.9)
ECHO LV INTERNAL DIMENSION SYSTOLIC INDEX: 1.51 CM/M2
ECHO LV INTERNAL DIMENSION SYSTOLIC: 3.3 CM
ECHO LV IVSD: 1.4 CM (ref 0.6–1)
ECHO LV MASS 2D: 300.4 G (ref 88–224)
ECHO LV MASS INDEX 2D: 137.8 G/M2 (ref 49–115)
ECHO LV POSTERIOR WALL DIASTOLIC: 1.4 CM (ref 0.6–1)
ECHO LV RELATIVE WALL THICKNESS RATIO: 0.55
ECHO RA VOLUME BIPLANE METHOD OF DISKS: 91 ML
ECHO RA VOLUME INDEX BP: 42 ML/M2
EPITH CASTS URNS QL MICRO: ABNORMAL /LPF (ref 0–5)
ERYTHROCYTE [DISTWIDTH] IN BLOOD BY AUTOMATED COUNT: 17.5 % (ref 11.6–14.5)
FLUAV SUBTYP SPEC NAA+PROBE: NOT DETECTED
FLUBV RNA SPEC QL NAA+PROBE: NOT DETECTED
GLUCOSE BLD STRIP.AUTO-MCNC: 108 MG/DL (ref 70–110)
GLUCOSE BLD STRIP.AUTO-MCNC: 114 MG/DL (ref 70–110)
GLUCOSE BLD STRIP.AUTO-MCNC: 114 MG/DL (ref 70–110)
GLUCOSE BLD STRIP.AUTO-MCNC: 122 MG/DL (ref 70–110)
GLUCOSE SERPL-MCNC: 97 MG/DL (ref 74–99)
GLUCOSE UR STRIP.AUTO-MCNC: NEGATIVE MG/DL
HADV DNA SPEC QL NAA+PROBE: NOT DETECTED
HCOV 229E RNA SPEC QL NAA+PROBE: NOT DETECTED
HCOV HKU1 RNA SPEC QL NAA+PROBE: NOT DETECTED
HCOV NL63 RNA SPEC QL NAA+PROBE: NOT DETECTED
HCOV OC43 RNA SPEC QL NAA+PROBE: NOT DETECTED
HCT VFR BLD AUTO: 22.3 % (ref 36–48)
HGB BLD-MCNC: 6.8 G/DL (ref 13–16)
HGB UR QL STRIP: NEGATIVE
HISTORY CHECK: NORMAL
HMPV RNA SPEC QL NAA+PROBE: NOT DETECTED
HPIV1 RNA SPEC QL NAA+PROBE: NOT DETECTED
HPIV2 RNA SPEC QL NAA+PROBE: NOT DETECTED
HPIV3 RNA SPEC QL NAA+PROBE: NOT DETECTED
HPIV4 RNA SPEC QL NAA+PROBE: NOT DETECTED
KETONES UR QL STRIP.AUTO: NEGATIVE MG/DL
LEUKOCYTE ESTERASE UR QL STRIP.AUTO: NEGATIVE
M PNEUMO DNA SPEC QL NAA+PROBE: NOT DETECTED
MAGNESIUM SERPL-MCNC: 2.1 MG/DL (ref 1.6–2.6)
MCH RBC QN AUTO: 26.4 PG (ref 24–34)
MCHC RBC AUTO-ENTMCNC: 30.5 G/DL (ref 31–37)
MCV RBC AUTO: 86.4 FL (ref 78–100)
NITRITE UR QL STRIP.AUTO: NEGATIVE
NRBC # BLD: 0 K/UL (ref 0–0.01)
NRBC BLD-RTO: 0 PER 100 WBC
PH UR STRIP: 5 (ref 5–8)
PHOSPHATE SERPL-MCNC: 4.6 MG/DL (ref 2.5–4.9)
PLATELET # BLD AUTO: 236 K/UL (ref 135–420)
PMV BLD AUTO: 10.8 FL (ref 9.2–11.8)
POTASSIUM SERPL-SCNC: 3.2 MMOL/L (ref 3.5–5.5)
PROT UR STRIP-MCNC: 300 MG/DL
RBC # BLD AUTO: 2.58 M/UL (ref 4.35–5.65)
RBC #/AREA URNS HPF: NEGATIVE /HPF (ref 0–5)
RSV RNA SPEC QL NAA+PROBE: NOT DETECTED
RV+EV RNA SPEC QL NAA+PROBE: NOT DETECTED
SARS-COV-2 RNA RESP QL NAA+PROBE: NOT DETECTED
SODIUM SERPL-SCNC: 142 MMOL/L (ref 136–145)
SP GR UR REFRACTOMETRY: 1.02 (ref 1–1.03)
URATE CRY URNS QL MICRO: ABNORMAL
UROBILINOGEN UR QL STRIP.AUTO: 0.2 EU/DL (ref 0.2–1)
WBC # BLD AUTO: 7.7 K/UL (ref 4.6–13.2)
WBC URNS QL MICRO: NEGATIVE /HPF (ref 0–4)

## 2024-04-11 PROCEDURE — 6370000000 HC RX 637 (ALT 250 FOR IP): Performed by: STUDENT IN AN ORGANIZED HEALTH CARE EDUCATION/TRAINING PROGRAM

## 2024-04-11 PROCEDURE — 84100 ASSAY OF PHOSPHORUS: CPT

## 2024-04-11 PROCEDURE — 2140000001 HC CVICU INTERMEDIATE R&B

## 2024-04-11 PROCEDURE — A4216 STERILE WATER/SALINE, 10 ML: HCPCS | Performed by: PHYSICIAN ASSISTANT

## 2024-04-11 PROCEDURE — P9016 RBC LEUKOCYTES REDUCED: HCPCS

## 2024-04-11 PROCEDURE — 0202U NFCT DS 22 TRGT SARS-COV-2: CPT

## 2024-04-11 PROCEDURE — 2580000003 HC RX 258: Performed by: INTERNAL MEDICINE

## 2024-04-11 PROCEDURE — 87186 SC STD MICRODIL/AGAR DIL: CPT

## 2024-04-11 PROCEDURE — 6360000002 HC RX W HCPCS: Performed by: INTERNAL MEDICINE

## 2024-04-11 PROCEDURE — 99232 SBSQ HOSP IP/OBS MODERATE 35: CPT | Performed by: INTERNAL MEDICINE

## 2024-04-11 PROCEDURE — 2700000000 HC OXYGEN THERAPY PER DAY

## 2024-04-11 PROCEDURE — 6370000000 HC RX 637 (ALT 250 FOR IP): Performed by: INTERNAL MEDICINE

## 2024-04-11 PROCEDURE — 87086 URINE CULTURE/COLONY COUNT: CPT

## 2024-04-11 PROCEDURE — 81001 URINALYSIS AUTO W/SCOPE: CPT

## 2024-04-11 PROCEDURE — 85027 COMPLETE CBC AUTOMATED: CPT

## 2024-04-11 PROCEDURE — 94761 N-INVAS EAR/PLS OXIMETRY MLT: CPT

## 2024-04-11 PROCEDURE — 85018 HEMOGLOBIN: CPT

## 2024-04-11 PROCEDURE — 93308 TTE F-UP OR LMTD: CPT | Performed by: INTERNAL MEDICINE

## 2024-04-11 PROCEDURE — 6370000000 HC RX 637 (ALT 250 FOR IP): Performed by: PHYSICIAN ASSISTANT

## 2024-04-11 PROCEDURE — 80048 BASIC METABOLIC PNL TOTAL CA: CPT

## 2024-04-11 PROCEDURE — 2580000003 HC RX 258: Performed by: PHYSICIAN ASSISTANT

## 2024-04-11 PROCEDURE — 93308 TTE F-UP OR LMTD: CPT

## 2024-04-11 PROCEDURE — 97166 OT EVAL MOD COMPLEX 45 MIN: CPT

## 2024-04-11 PROCEDURE — 87088 URINE BACTERIA CULTURE: CPT

## 2024-04-11 PROCEDURE — 36415 COLL VENOUS BLD VENIPUNCTURE: CPT

## 2024-04-11 PROCEDURE — C9113 INJ PANTOPRAZOLE SODIUM, VIA: HCPCS | Performed by: PHYSICIAN ASSISTANT

## 2024-04-11 PROCEDURE — 82962 GLUCOSE BLOOD TEST: CPT

## 2024-04-11 PROCEDURE — 97535 SELF CARE MNGMENT TRAINING: CPT

## 2024-04-11 PROCEDURE — 83735 ASSAY OF MAGNESIUM: CPT

## 2024-04-11 PROCEDURE — 85014 HEMATOCRIT: CPT

## 2024-04-11 PROCEDURE — 36430 TRANSFUSION BLD/BLD COMPNT: CPT

## 2024-04-11 PROCEDURE — 6360000002 HC RX W HCPCS: Performed by: PHYSICIAN ASSISTANT

## 2024-04-11 PROCEDURE — 87040 BLOOD CULTURE FOR BACTERIA: CPT

## 2024-04-11 RX ORDER — SODIUM CHLORIDE 9 MG/ML
INJECTION, SOLUTION INTRAVENOUS PRN
Status: DISCONTINUED | OUTPATIENT
Start: 2024-04-11 | End: 2024-04-16

## 2024-04-11 RX ADMIN — BUMETANIDE 1 MG: 0.25 INJECTION INTRAMUSCULAR; INTRAVENOUS at 17:05

## 2024-04-11 RX ADMIN — POTASSIUM CHLORIDE 20 MEQ: 1500 TABLET, EXTENDED RELEASE ORAL at 21:12

## 2024-04-11 RX ADMIN — HYDRALAZINE HYDROCHLORIDE 100 MG: 50 TABLET ORAL at 06:07

## 2024-04-11 RX ADMIN — HYDRALAZINE HYDROCHLORIDE 100 MG: 50 TABLET ORAL at 12:31

## 2024-04-11 RX ADMIN — AZITHROMYCIN DIHYDRATE 500 MG: 500 INJECTION, POWDER, LYOPHILIZED, FOR SOLUTION INTRAVENOUS at 17:15

## 2024-04-11 RX ADMIN — ISOSORBIDE DINITRATE 20 MG: 10 TABLET ORAL at 21:10

## 2024-04-11 RX ADMIN — ATORVASTATIN CALCIUM 40 MG: 40 TABLET, FILM COATED ORAL at 21:12

## 2024-04-11 RX ADMIN — FINASTERIDE 5 MG: 5 TABLET, FILM COATED ORAL at 21:12

## 2024-04-11 RX ADMIN — ISOSORBIDE DINITRATE 20 MG: 10 TABLET ORAL at 15:00

## 2024-04-11 RX ADMIN — BUMETANIDE 1 MG: 0.25 INJECTION INTRAMUSCULAR; INTRAVENOUS at 08:52

## 2024-04-11 RX ADMIN — POTASSIUM CHLORIDE 20 MEQ: 1500 TABLET, EXTENDED RELEASE ORAL at 08:52

## 2024-04-11 RX ADMIN — AMLODIPINE BESYLATE 10 MG: 10 TABLET ORAL at 08:52

## 2024-04-11 RX ADMIN — ISOSORBIDE DINITRATE 20 MG: 10 TABLET ORAL at 08:52

## 2024-04-11 RX ADMIN — ACETAMINOPHEN 325MG 650 MG: 325 TABLET ORAL at 00:19

## 2024-04-11 RX ADMIN — WATER 1000 MG: 1 INJECTION INTRAMUSCULAR; INTRAVENOUS; SUBCUTANEOUS at 17:11

## 2024-04-11 RX ADMIN — HYDRALAZINE HYDROCHLORIDE 100 MG: 50 TABLET ORAL at 21:12

## 2024-04-11 RX ADMIN — PANTOPRAZOLE SODIUM 40 MG: 40 INJECTION, POWDER, FOR SOLUTION INTRAVENOUS at 23:35

## 2024-04-11 RX ADMIN — POTASSIUM CHLORIDE 20 MEQ: 1500 TABLET, EXTENDED RELEASE ORAL at 15:30

## 2024-04-11 RX ADMIN — PANTOPRAZOLE SODIUM 40 MG: 40 INJECTION, POWDER, FOR SOLUTION INTRAVENOUS at 16:04

## 2024-04-11 RX ADMIN — POTASSIUM CHLORIDE 40 MEQ: 1500 TABLET, EXTENDED RELEASE ORAL at 09:54

## 2024-04-11 RX ADMIN — SERTRALINE HYDROCHLORIDE 50 MG: 50 TABLET ORAL at 08:52

## 2024-04-11 ASSESSMENT — PAIN SCALES - GENERAL
PAINLEVEL_OUTOF10: 0
PAINLEVEL_OUTOF10: 4
PAINLEVEL_OUTOF10: 0

## 2024-04-11 ASSESSMENT — PAIN DESCRIPTION - ORIENTATION: ORIENTATION: LEFT

## 2024-04-11 ASSESSMENT — PAIN DESCRIPTION - LOCATION: LOCATION: KNEE

## 2024-04-11 NOTE — PLAN OF CARE
Problem: Safety - Adult  Goal: Free from fall injury  Outcome: Progressing     Problem: Discharge Planning  Goal: Discharge to home or other facility with appropriate resources  Outcome: Progressing  Flowsheets  Taken 4/10/2024 2030 by Olivia Kwok RN  Discharge to home or other facility with appropriate resources: Identify barriers to discharge with patient and caregiver  Taken 4/10/2024 0757 by Ann Marie Ca RN  Discharge to home or other facility with appropriate resources:   Identify barriers to discharge with patient and caregiver   Arrange for needed discharge resources and transportation as appropriate     Problem: Skin/Tissue Integrity  Goal: Absence of new skin breakdown  Description: 1.  Monitor for areas of redness and/or skin breakdown  2.  Assess vascular access sites hourly  3.  Every 4-6 hours minimum:  Change oxygen saturation probe site  4.  Every 4-6 hours:  If on nasal continuous positive airway pressure, respiratory therapy assess nares and determine need for appliance change or resting period.  Outcome: Progressing     Problem: ABCDS Injury Assessment  Goal: Absence of physical injury  Outcome: Progressing     Problem: Pain  Goal: Verbalizes/displays adequate comfort level or baseline comfort level  Outcome: Progressing  Flowsheets  Taken 4/10/2024 1500 by Ann Marie Ca, FRANSISCA  Verbalizes/displays adequate comfort level or baseline comfort level:   Assess pain using appropriate pain scale   Encourage patient to monitor pain and request assistance  Taken 4/10/2024 1144 by Ann Marie Ca RN  Verbalizes/displays adequate comfort level or baseline comfort level:   Assess pain using appropriate pain scale   Encourage patient to monitor pain and request assistance  Taken 4/10/2024 0757 by Ann Marie Ca RN  Verbalizes/displays adequate comfort level or baseline comfort level:   Encourage patient to monitor pain and request assistance   Assess pain using appropriate pain scale     Problem: Chronic Conditions and

## 2024-04-11 NOTE — CONSULTS
Consult Note  Consult requested by: dr cheryl Wellington Diogenes is a 68 y.o. 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,dm,htn,sleep apnea,admitted with chf and anemia  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     On Allopurinol    Chronic hypokalemia     Persistent chronic hypokalemia + hypertension; ?primary hyperaldosteronism    CKD (chronic kidney disease)     Hypertension     Nephrotic syndrome     Obesity, Class I, BMI 30-34.9     Obstructive sleep apnea on CPAP     Pure hypercholesterolemia     Received intravenous tissue plasminogen activator (tPA) in emergency department 8/12/2019    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HCC)     ALECT2    Secondary hyperparathyroidism of renal origin (HCC) 8/18/2019    PTH (8/18/2019) = 101.7    Type 2 diabetes mellitus with stage 2 chronic kidney disease (HCC)     HbA1c (8/13/2019) = 6.6 no meds    Vitamin B12 deficiency anemia 8/14/2019    Vitamin B12 (8/14/2019) = 208    Vitamin D deficiency 8/19/2019    Vitamin D 25-Hydroxy (8/19/2019) = 16.2       Past Surgical History:   Procedure Laterality Date    COLONOSCOPY N/A 4/15/2019    COLONOSCOPY performed by Héctor Piedra MD at [facility] ENDOSCOPY    COLONOSCOPY N/A 12/20/2021    COLONOSCOPY with polypectomies performed by Héctor Piedra MD at [facility] ENDOSCOPY    CT BIOPSY RENAL  11/2/2023    CT BIOPSY RENAL 11/2/2023 [facility] RAD CT    TONSILLECTOMY         Social History     Socioeconomic History    Marital status:      Spouse name: Not

## 2024-04-11 NOTE — PROGRESS NOTES
0900:  GI provider at bedside discussing plan of care with patient. RN notified NARINDER [facility] GI service of 0500 4/11/24 H/H results. EGD will not be done today, pt agreeable to plan of care; provider will reeval tomorrow. NPO status d/c;d. Diet resumed. Breakfast tray obtained, served, RN assisted with meal. Monitor.  1000:  RN notified Dr Gonzalez of today H/H via Perfect Serve. Covid swab specimen, Urine specimen for UA, C&S sent to lab; pt aware of isolation precautions in place until Covid test results received.   1045:  Dr Gonzalez on rounds. New orders received including Transfuse 1unit PRBC. Antibiotics to start after Blood Cultures obtained, stat orders placed by MD, see chart for details.  1145:   at bedside to draw Blood Cultures as ordered, unable to draw now as patient on BSC for BM.   1200:  PRBC transfusion started per policy.  will draw blood cultures when transfusion complete, then RN can administer IV Antibiotics per MD orders.  1220:  Cardiology providers on rounds, no new orders at this time. Covid Negative results noted, Isolation d/c'd; pt informed.  1500:  PRBC transfusion complete without reaction noted. Assisted OOB to BSC for BM with 2 person assist, then return to bed, right arm elevated on pillow. Tolerated activity fairly well. RN calling phlebotomy dept to inform of CBC, Blood Cultures needed as ordered.  1700:   unable to obtain specimens for Blood Cultures and post transfusion bloodwork as ordered; next  will attempt approx 1800 at shift change. RN notified Dr Gonzalez with order received to administer IV Antibiotics now. Same done, see mar. Dinner served and assisted with same.  1900:  2 person assist OOB to BSC for BM then return to bed max assist. Repositioned for comfort. RN called lab for tech to retry for blood specimen draws.  1930:  Shift change report given to FRANSISCA Monk with rounds.

## 2024-04-11 NOTE — PROGRESS NOTES
1930- Bedside and Verbal shift change report given to FRANSISCA Baker & FRANSISCA Shabazz (oncoming nurses) by FRANSISCA Jesus (offgoing nurse). Report included the following information SBAR, Intake/Output, MAR, Recent Results, and Cardiac Rhythm AFIB .     2030- Shift assessment performed per flowsheets. Patient alert and oriented x 4, awake, and resting comfortably in bed. Patient oriented to room and call bell within reach.    2056- Patient medicated per MAR.    0019- Patient medicated per MAR.    0028- Reassessment performed per flowsheets. Patient alert and oriented x 4, easily awaken, and resting comfortably in bed. Patient oriented to room and call bell within reach.    0345- Reassessment performed per flowsheets. Patient alert and oriented x 4, easily awaken, and resting comfortably in bed. Patient oriented to room and call bell within reach.    0607- Patient medicated per MAR.    0616- Perfect Serve message sent to Dr. Whitman informing him of Patient's 8 lbs weight gain in one day. 4/10/24 weight = 219lbs and 4/11/24 weight = 227 lbs. Dr. Whitman requests that this information be given to the daytime attending.    0735- Bedside and Verbal shift change report given to FRANSISCA Jolley (oncoming nurse) by FRANSISCA Baker (offgoing nurse). Report included the following information SBAR, Intake/Output, MAR, Recent Results, and Cardiac Rhythm NSR/Sinus Charles, Weight Trends .

## 2024-04-11 NOTE — PROGRESS NOTES
[facility] - Progress Note    Admit Date: 4/8/2024  Attending Cardiologist: Dr. Chuck Ramírez    Assessment:     -Dyspnea, multifactorial in setting of below.   -Anemia/possible GIB, +FOBT, Hgb 5.9 on admission, s/p transfusion. EGD cancelled d/t orthopnea.   -A/c HFpEF exacerbation, SOB, orthopnea, elevated BNP. CXR with congestion.   Echo 01/31/24, LVEF 50-55%.   -Moderate pericardial effusion without tamponade TTE 01/2024 (1-2 cm).   -SSS/Persistent Afib with SVR, asymptomatic, historically limiting rate controlling agents. Patient previously declined to be on anticoagulation as outpatient. On ASA only.   -HTN, uncontrolled.   -DM2  -CHIOMA, uses cpap.   -Hx CVA with residual right sided deficits   -ALECT2 Renal Amyloidosis.   -Proteinuria   -Obesity     Primary cardiologist, Dr. Chuck Ramírez      Plan:       I saw, evaluated, interviewed and examined the patient personally.  Dyspnea improving.  No chest pain or chest tightness  Fluid overload on exam.  Continue with IV diuretics  Appreciate renal input  Continue antihypertensive.  Anemia, status post PRBC, awaiting EGD once volume status improved    Chuck Ramírez MD       -Continue IV Bumex, pt states breathing is gradually improving.  -Continue Hydralazine, Imdur.  -No ACEi/ARB/ARNI/AA/SGLT2i secondary to renal function.  -No BB due to bradycardia.  -Management of anemia per primary team.  Recommend to maintain Hgb > 8 from cardiac standpoint if possible.    Subjective:     No new complaints. Breathing gradually improving.    Objective:      Patient Vitals for the past 8 hrs:   Temp Pulse Resp BP SpO2   04/11/24 1217 98.5 °F (36.9 °C) 55 20 (!) 173/69 97 %   04/11/24 1202 98 °F (36.7 °C) 50 20 (!) 178/95 98 %   04/11/24 1157 98 °F (36.7 °C) 50 20 (!) 178/95 98 %   04/11/24 1126 98.1 °F (36.7 °C) 54 20 (!) 127/59 97 %   04/11/24 0716 97.2 °F (36.2 °C) 51 20 (!) 162/72 97 %   04/11/24 0607 -- -- -- (!) 143/69 --         Patient Vitals for the past 96  at 4/11/2024 1319  Last data filed at 4/11/2024 1202  Gross per 24 hour   Intake 1420 ml   Output 1100 ml   Net 320 ml       Physical Exam:  General:  alert, appears stated age, cooperative, and no distress  Neck:  supple  Lungs:  basilar rales  Heart:  irregularly irregular rhythm  Abdomen:  abdomen is soft without significant tenderness, masses, organomegaly or guarding  Extremities:  atraumatic, + edema    Visit Vitals  BP (!) 173/69   Pulse 55   Temp 98.5 °F (36.9 °C) (Oral)   Resp 20   Ht 1.753 m (5' 9\")   Wt 103 kg (227 lb 1.6 oz)   SpO2 97%   BMI 33.54 kg/m²       Data Review:     Labs: Results:       Chemistry Recent Labs     04/09/24  0345 04/09/24  1208 04/09/24  2134 04/10/24  1700 04/11/24  0518      < > 141 140 142   K 2.3*   < > 3.2* 3.4* 3.2*      < > 106 107 109   CO2 28   < > 29 28 27   BUN 62*   < > 59* 58* 60*   CREATININE 3.33*   < > 3.32* 3.10* 3.11*   MG  --   --  2.3 2.0 2.1   PHOS  --   --   --   --  4.6   GLOB 3.7  --   --   --   --     < > = values in this interval not displayed.      CBC w/Diff Recent Labs     04/09/24  0345 04/10/24  1700 04/11/24  0518   WBC 11.4 16.8* 7.7   RBC 2.95* 2.91* 2.58*   HGB 7.6* 7.8* 6.8*   HCT 24.8* 24.9* 22.3*    316 236      Cardiac Enzymes Lab Results   Component Value Date/Time    TROPHS 77 04/08/2024 09:00 AM    TROPHS 41 03/17/2024 12:01 PM    TROPHS 42 03/17/2024 10:09 AM      Lipid Panel Lab Results   Component Value Date/Time    CHOL 131 02/12/2024 10:24 AM    CHOL 173 08/14/2019 03:42 AM    HDL 51 02/12/2024 10:24 AM      BNP Lab Results   Component Value Date/Time    NTPROBNP 22,633 04/08/2024 09:00 AM      Liver Enzymes Lab Results   Component Value Date    ALT 27 04/09/2024    AST 35 04/09/2024    ALKPHOS 86 04/09/2024    BILITOT 1.2 (H) 04/09/2024      Thyroid Studies Lab Results   Component Value Date/Time    TSH 4.63 08/19/2019 06:55 AM          Signed By: Beverly Michel PA-C     April 11, 2024

## 2024-04-11 NOTE — PROGRESS NOTES
[email or web address]  [phone]    Gastroenterology follow up-Progress note    Impression:  1. Acute on chronic multifactorial iron deficiency anemia requiring blood transfusions s/p 2u PRBCs  2. Heme positive stool without overt GI bleeding/hematemesis  3. Epistaxis  4. Dyspnea, chronic with orthopnea  5. CHF with edema  6. A fib/sick sinus syndrome - not on anticoagulation other than 81 mg ASA  7. CKD   8. HTN  9. CVA (2019) with right-sided hemiparesis  10. Hypokalemia - repleted  11. Sleep apnea with use of CPAP    Plan:  1. Patient continues to have dyspnea even at rest during conversation which puts him at significant cardiopulmonary complications with use of moderate sedation required for an EGD. In absence of overt GI bleeding no endoscopic procedures will be pursued. If situation arises where endoscopic intervention is necessary, then risk vs benefit of sedation will be assessed by endoscopist at that time. For now best for conservative management with PPI BID and transfuse as per protocol. Pantoprazole 40 mg IV BID ordered. Please continue PPI BID oral at discharge.    Will reassess patient tomorrow.        Chief Complaint: symptomatic anemia with SOB      Subjective:  no overt GI bleeding and no GI symptoms to address. Continues to have shortness of breath.    4/10/24 - No abdominal pain, reflux, hematemesis, melena, or hematochezia.     Summary  - Ferritin 171, Iron 24, iron sat 9%  - Occult stool positive  - Hgb 5.9 upon admission w/ baseline in 7-9 range. FOB in March on last admission with same H/H. 3/17 NM scan neg. No overt GI bleeding, abd pain, or n/v.   - Followed by [facility] as OP w/ dx of ADELINE and anemia of chronic disease; received IV iron last 6/2023 but should be on PO iron now, also on q8wk darbepoeitin per 12/2023 [facility] note.   - GI w/u of chronic ADELINE includes EGD-[facility] in 2019 w/o significant findings. Pill cam study in 2021 revealed possible old blood in small bowel and possible ulcerations in

## 2024-04-12 ENCOUNTER — APPOINTMENT (OUTPATIENT)
Facility: HOSPITAL | Age: 69
DRG: 291 | End: 2024-04-12
Payer: MEDICARE

## 2024-04-12 PROBLEM — I50.9 ACUTE DECOMPENSATED HEART FAILURE (HCC): Status: ACTIVE | Noted: 2024-04-12

## 2024-04-12 LAB
ABO + RH BLD: NORMAL
ANION GAP SERPL CALC-SCNC: 7 MMOL/L (ref 3–18)
BASOPHILS # BLD: 0 K/UL (ref 0–0.1)
BASOPHILS NFR BLD: 0 % (ref 0–2)
BLD PROD TYP BPU: NORMAL
BLOOD BANK BLOOD PRODUCT EXPIRATION DATE: NORMAL
BLOOD BANK DISPENSE STATUS: NORMAL
BLOOD BANK ISBT PRODUCT BLOOD TYPE: 6200
BLOOD BANK ISBT PRODUCT BLOOD TYPE: 6200
BLOOD BANK ISBT PRODUCT BLOOD TYPE: 7300
BLOOD BANK PRODUCT CODE: NORMAL
BLOOD BANK UNIT TYPE AND RH: NORMAL
BLOOD GROUP ANTIBODIES SERPL: NORMAL
BPU ID: NORMAL
BUN SERPL-MCNC: 66 MG/DL (ref 7–18)
BUN/CREAT SERPL: 21 (ref 12–20)
CALCIUM SERPL-MCNC: 7.8 MG/DL (ref 8.5–10.1)
CALLED TO: NORMAL
CALLED TO:: NORMAL
CHLORIDE SERPL-SCNC: 110 MMOL/L (ref 100–111)
CO2 SERPL-SCNC: 25 MMOL/L (ref 21–32)
CREAT SERPL-MCNC: 3.16 MG/DL (ref 0.6–1.3)
CROSSMATCH RESULT: NORMAL
DIFFERENTIAL METHOD BLD: ABNORMAL
EOSINOPHIL # BLD: 0.1 K/UL (ref 0–0.4)
EOSINOPHIL NFR BLD: 1 % (ref 0–5)
ERYTHROCYTE [DISTWIDTH] IN BLOOD BY AUTOMATED COUNT: 17 % (ref 11.6–14.5)
GLUCOSE BLD STRIP.AUTO-MCNC: 101 MG/DL (ref 70–110)
GLUCOSE BLD STRIP.AUTO-MCNC: 109 MG/DL (ref 70–110)
GLUCOSE BLD STRIP.AUTO-MCNC: 97 MG/DL (ref 70–110)
GLUCOSE BLD STRIP.AUTO-MCNC: 99 MG/DL (ref 70–110)
GLUCOSE SERPL-MCNC: 89 MG/DL (ref 74–99)
HBV SURFACE AG SER QL: <0.1 INDEX
HBV SURFACE AG SER QL: NEGATIVE
HCT VFR BLD AUTO: 23.5 % (ref 36–48)
HGB BLD-MCNC: 7.1 G/DL (ref 13–16)
IMM GRANULOCYTES # BLD AUTO: 0 K/UL (ref 0–0.04)
IMM GRANULOCYTES NFR BLD AUTO: 1 % (ref 0–0.5)
LYMPHOCYTES # BLD: 0.7 K/UL (ref 0.9–3.6)
LYMPHOCYTES NFR BLD: 13 % (ref 21–52)
MCH RBC QN AUTO: 26.4 PG (ref 24–34)
MCHC RBC AUTO-ENTMCNC: 30.2 G/DL (ref 31–37)
MCV RBC AUTO: 87.4 FL (ref 78–100)
MONOCYTES # BLD: 0.9 K/UL (ref 0.05–1.2)
MONOCYTES NFR BLD: 17 % (ref 3–10)
NEUTS SEG # BLD: 3.6 K/UL (ref 1.8–8)
NEUTS SEG NFR BLD: 68 % (ref 40–73)
NRBC # BLD: 0 K/UL (ref 0–0.01)
NRBC BLD-RTO: 0 PER 100 WBC
PLATELET # BLD AUTO: 198 K/UL (ref 135–420)
PMV BLD AUTO: 11 FL (ref 9.2–11.8)
POTASSIUM SERPL-SCNC: 4 MMOL/L (ref 3.5–5.5)
RBC # BLD AUTO: 2.69 M/UL (ref 4.35–5.65)
SODIUM SERPL-SCNC: 142 MMOL/L (ref 136–145)
SPECIMEN EXP DATE BLD: NORMAL
UNIT DIVISION: 0
UNIT ISSUE DATE/TIME: NORMAL
WBC # BLD AUTO: 5.3 K/UL (ref 4.6–13.2)

## 2024-04-12 PROCEDURE — 6370000000 HC RX 637 (ALT 250 FOR IP): Performed by: INTERNAL MEDICINE

## 2024-04-12 PROCEDURE — 80048 BASIC METABOLIC PNL TOTAL CA: CPT

## 2024-04-12 PROCEDURE — 36556 INSERT NON-TUNNEL CV CATH: CPT

## 2024-04-12 PROCEDURE — 02HV33Z INSERTION OF INFUSION DEVICE INTO SUPERIOR VENA CAVA, PERCUTANEOUS APPROACH: ICD-10-PCS | Performed by: SURGERY

## 2024-04-12 PROCEDURE — 2700000000 HC OXYGEN THERAPY PER DAY

## 2024-04-12 PROCEDURE — 6360000002 HC RX W HCPCS: Performed by: INTERNAL MEDICINE

## 2024-04-12 PROCEDURE — C9113 INJ PANTOPRAZOLE SODIUM, VIA: HCPCS | Performed by: PHYSICIAN ASSISTANT

## 2024-04-12 PROCEDURE — 5A1D70Z PERFORMANCE OF URINARY FILTRATION, INTERMITTENT, LESS THAN 6 HOURS PER DAY: ICD-10-PCS | Performed by: INTERNAL MEDICINE

## 2024-04-12 PROCEDURE — 6360000002 HC RX W HCPCS: Performed by: PHYSICIAN ASSISTANT

## 2024-04-12 PROCEDURE — 36415 COLL VENOUS BLD VENIPUNCTURE: CPT

## 2024-04-12 PROCEDURE — 2580000003 HC RX 258: Performed by: PHYSICIAN ASSISTANT

## 2024-04-12 PROCEDURE — 85025 COMPLETE CBC W/AUTO DIFF WBC: CPT

## 2024-04-12 PROCEDURE — 82962 GLUCOSE BLOOD TEST: CPT

## 2024-04-12 PROCEDURE — 6370000000 HC RX 637 (ALT 250 FOR IP): Performed by: STUDENT IN AN ORGANIZED HEALTH CARE EDUCATION/TRAINING PROGRAM

## 2024-04-12 PROCEDURE — 99232 SBSQ HOSP IP/OBS MODERATE 35: CPT | Performed by: INTERNAL MEDICINE

## 2024-04-12 PROCEDURE — 90935 HEMODIALYSIS ONE EVALUATION: CPT

## 2024-04-12 PROCEDURE — 87340 HEPATITIS B SURFACE AG IA: CPT

## 2024-04-12 PROCEDURE — A4216 STERILE WATER/SALINE, 10 ML: HCPCS | Performed by: PHYSICIAN ASSISTANT

## 2024-04-12 PROCEDURE — 86706 HEP B SURFACE ANTIBODY: CPT

## 2024-04-12 PROCEDURE — 2580000003 HC RX 258: Performed by: INTERNAL MEDICINE

## 2024-04-12 PROCEDURE — 71045 X-RAY EXAM CHEST 1 VIEW: CPT

## 2024-04-12 PROCEDURE — 6370000000 HC RX 637 (ALT 250 FOR IP): Performed by: PHYSICIAN ASSISTANT

## 2024-04-12 PROCEDURE — 2140000001 HC CVICU INTERMEDIATE R&B

## 2024-04-12 PROCEDURE — 94761 N-INVAS EAR/PLS OXIMETRY MLT: CPT

## 2024-04-12 RX ORDER — BUMETANIDE 0.25 MG/ML
1 INJECTION INTRAMUSCULAR; INTRAVENOUS 2 TIMES DAILY
Status: DISCONTINUED | OUTPATIENT
Start: 2024-04-12 | End: 2024-04-17 | Stop reason: HOSPADM

## 2024-04-12 RX ADMIN — BUMETANIDE 1 MG: 0.25 INJECTION INTRAMUSCULAR; INTRAVENOUS at 08:44

## 2024-04-12 RX ADMIN — FINASTERIDE 5 MG: 5 TABLET, FILM COATED ORAL at 21:23

## 2024-04-12 RX ADMIN — ATORVASTATIN CALCIUM 40 MG: 40 TABLET, FILM COATED ORAL at 21:23

## 2024-04-12 RX ADMIN — PANTOPRAZOLE SODIUM 40 MG: 40 INJECTION, POWDER, FOR SOLUTION INTRAVENOUS at 23:08

## 2024-04-12 RX ADMIN — ISOSORBIDE DINITRATE 20 MG: 10 TABLET ORAL at 08:44

## 2024-04-12 RX ADMIN — POTASSIUM CHLORIDE 20 MEQ: 1500 TABLET, EXTENDED RELEASE ORAL at 08:45

## 2024-04-12 RX ADMIN — HYDRALAZINE HYDROCHLORIDE 100 MG: 50 TABLET ORAL at 13:03

## 2024-04-12 RX ADMIN — POTASSIUM CHLORIDE 20 MEQ: 1500 TABLET, EXTENDED RELEASE ORAL at 21:23

## 2024-04-12 RX ADMIN — POTASSIUM CHLORIDE 20 MEQ: 1500 TABLET, EXTENDED RELEASE ORAL at 13:03

## 2024-04-12 RX ADMIN — AMLODIPINE BESYLATE 10 MG: 10 TABLET ORAL at 08:45

## 2024-04-12 RX ADMIN — ISOSORBIDE DINITRATE 20 MG: 10 TABLET ORAL at 13:03

## 2024-04-12 RX ADMIN — HYDRALAZINE HYDROCHLORIDE 100 MG: 50 TABLET ORAL at 05:53

## 2024-04-12 RX ADMIN — SERTRALINE HYDROCHLORIDE 50 MG: 50 TABLET ORAL at 08:45

## 2024-04-12 RX ADMIN — PANTOPRAZOLE SODIUM 40 MG: 40 INJECTION, POWDER, FOR SOLUTION INTRAVENOUS at 10:54

## 2024-04-12 RX ADMIN — AZITHROMYCIN DIHYDRATE 500 MG: 500 INJECTION, POWDER, LYOPHILIZED, FOR SOLUTION INTRAVENOUS at 10:53

## 2024-04-12 RX ADMIN — ISOSORBIDE DINITRATE 20 MG: 10 TABLET ORAL at 21:20

## 2024-04-12 RX ADMIN — WATER 1000 MG: 1 INJECTION INTRAMUSCULAR; INTRAVENOUS; SUBCUTANEOUS at 10:54

## 2024-04-12 RX ADMIN — BUMETANIDE 1 MG: 0.25 INJECTION INTRAMUSCULAR; INTRAVENOUS at 18:58

## 2024-04-12 RX ADMIN — IRON SUCROSE 200 MG: 20 INJECTION, SOLUTION INTRAVENOUS at 11:30

## 2024-04-12 RX ADMIN — HYDRALAZINE HYDROCHLORIDE 100 MG: 50 TABLET ORAL at 21:23

## 2024-04-12 ASSESSMENT — PAIN SCALES - GENERAL
PAINLEVEL_OUTOF10: 0

## 2024-04-12 NOTE — PROGRESS NOTES
[facility]  Progress Note    Patient: Amish Shetty Age: 68 y.o. : 1955 MR#: [id number] SSN: [id number]  Date/Time: 2024    Subjective:     Pt reports some minimal improvement in breathing. Did not know he was having fevers intermittently overnight. No reports of dysuria.     Assessment/Plan:   68 y o male with multiple medical conditions including anemia w/ multiple work ups in the past w/o clear diagnosis admitted after he presented w/ c/o sob    -Sepsis (fever +leukocytosis) unclear source, ? Pulmonary source given respiratory symptoms. Resolved. UA negative, blood cx, urine cx pending Respiratory virus panel negative. Started on CAP abx.   -Acute on chronic iron def anemia: no Gi source identified w/ previous work ups. S/p PRBC transfusion now at back down below 7. Fecal occult testing +ve this admission per report. Endoscopy cancelled due to orthopnea  -Hypokalemia: likely due to renal loss due to lasix, normal mag  -Anasarca  -Acute HFpEF: now on bumex, cardiology following    HISTORY OF:  -HTN  -DM2  -CVA: right hemiplegia  -Afib not on OAC  -Depression  -CKD/proteinuria/amyloidosis      PLAN:  -Continue diuresis while monitoring K and renal function  -Transfuse and follow H+H   -Replace and follow K  -Per GI, given acute heart failure and inability for pt to lie down flat, no endoscopy w/o overt GI bleeding, conservative management recommended.   -PPI bid, continue at discharge  -PT/OT    Spoke to nurse about obtaining labs for today  Dispo: per OT notes, scoring for NOT home discharge      Additional Notes:      Case discussed with:  [x]Patient  []Family  [x]Nursing  [x]Case Management  DVT Prophylaxis:  []Lovenox  []Hep SQ  [x]SCDs  []Coumadin   []On Heparin gtt    Objective:   VS: BP (!) 150/83   Pulse 69   Temp 97.9 °F (36.6 °C)   Resp 20   Ht 1.753 m (5' 9\")   Wt 103 kg (227 lb)   SpO2 95%   BMI 33.52 kg/m²    Tmax/24hrs: Temp (24hrs),

## 2024-04-12 NOTE — OP NOTE
Operative Note      Patient: Amish Shetty  YOB: 1955  MRN: [id number]    Date of Procedure: 4/11/2024    Pre-Op Diagnosis Codes:     * Anemia, unspecified type [D64.9]  Chronic kidney disease with fluid overload requiring urgent hemodialysis and hemodialysis access    Post-Op Diagnosis: Same       Procedure:  Insertion of a 12 [place]e, 16 cm, triple-lumen, [place] temporary hemodialysis catheter, using ultrasound-guided venous access    Surgeon:  Adelaide Titus MD    Assistant:   * No surgical staff found *    Anesthesia: Local anesthesia-1% lidocaine    Estimated Blood Loss (mL): Minimal    Complications: None    Findings:  Patent right internal jugular vein.  Good flow through all the ports of the catheter    Detailed Description of Procedure:   The patient is a 68-year-old gentleman with chronic anemia, who is scheduled to have EGD.  However the patient cannot lie down flat for the procedure, as he also has chronic kidney disease, and is in fluid overload, with hypoxia requiring 3 L/min oxygen through nasal cannula.  Urgent hemodialysis is being planned, to facilitate supine positioning of the patient so that he can undergo EGD.  He had a previous stroke with right side flaccid paralysis.  He is otherwise awake alert and appropriately responsive and hemodynamically stable.  Informed consent was obtained from his wife Mary Shetty over the phone for the procedure.    The procedure was performed  at the patient's bedside.  The right side of the neck and upper chest were cleaned and draped in a sterile fashion.  After administering local anesthesia with 1% lidocaine, under ultrasound guidance, the right internal jugular vein was accessed, using a 21-gauge micropuncture needle, that was exchanged to a 4 [place]e micro sheath.  The micro sheath was exchanged to a 0.038 inch wire.  The venous access was serially dilated and the 12 [place]e triple-lumen catheter was inserted over the wire.  There was good

## 2024-04-12 NOTE — PROGRESS NOTES
NOTD      Parainfluenza 1 PCR Not detected NOTD      Parainfluenza 2 PCR Not detected NOTD      Parainfluenza 3 PCR Not detected NOTD      Parainfluenza 4 PCR Not detected NOTD      Respiratory Syncytial Virus by PCR Not detected NOTD      Bordetella parapertussis by PCR Not detected NOTD      Bordetella pertussis by PCR Not detected NOTD      Chlamydophila Pneumonia PCR Not detected NOTD      Mycoplasma pneumo by PCR Not detected NOTD     Urinalysis, Micro    Collection Time: 04/11/24 10:00 AM   Result Value Ref Range    WBC, UA Negative 0 - 4 /hpf    RBC, UA Negative 0 - 5 /hpf    Epithelial Cells UA FEW 0 - 5 /lpf    BACTERIA, URINE Negative NEG /hpf    Uric Acid Evonne, UA 2+ (A) NEG   PREPARE RBC (CROSSMATCH), 1 Units    Collection Time: 04/11/24 10:45 AM   Result Value Ref Range    History Check Historical check performed    POCT Glucose    Collection Time: 04/11/24 11:28 AM   Result Value Ref Range    POC Glucose 122 (H) 70 - 110 mg/dL   Echo (TTE) limited (PRN contrast/bubble/strain/3D)    Collection Time: 04/11/24  2:06 PM   Result Value Ref Range    IVSd 1.4 (A) 0.6 - 1.0 cm    LVIDd 5.1 4.2 - 5.9 cm    LVIDs 3.3 cm    LVPWd 1.4 (A) 0.6 - 1.0 cm    LA Diameter 4.9 cm    LA Volume A/L 132 mL    LA Volume A-L A4C 134 (A) 18 - 58 mL    LA Volume A-L A4C 124 (A) 18 - 58 mL    LA Volume MOD A2C 127 (A) 18 - 58 mL    LA Volume MOD A4C 118 (A) 18 - 58 mL    LA Volume  (A) 18 - 58 mL    Aortic Root 3.7 cm    Body Surface Area 2.24 m2    Fractional Shortening 2D 35 28 - 44 %    LVIDd Index 2.34 cm/m2    LVIDs Index 1.51 cm/m2    LV RWT Ratio 0.55     LV Mass 2D 300.4 (A) 88 - 224 g    LV Mass 2D Index 137.8 (A) 49 - 115 g/m2    LA Volume Index BP 57 (A) 16 - 34 ml/m2    LA Volume Index A/L 61 16 - 34 mL/m2    LA Volume Index A-L A2C 61 (A) 16 - 34 mL/m2    LA Volume Index A-L A4C 57 (A) 16 - 34 mL/m2    LA Volume Index MOD A2C 58 (A) 16 - 34 ml/m2    LA Volume Index MOD A4C 54 (A) 16 - 34 ml/m2    LA Size Index

## 2024-04-12 NOTE — PROGRESS NOTES
0950:  Dr Gonzalez on rounds, spoke with patient and wife in room, both verbalize understanding all info and plan of care.  1130:  Dr JOSE C Ramírez has spoken with pt and wife in room regarding clinical status and plan of care. Plan to include hemodialysis, pt and wife agreeable.  1215:  Dr Titus obtained informed consent via phone from wife, 2 RN witness. MD spoke with pt at length about procedure, pt agreeable. Surgeon placed 12fr HD catheter with pigtail Right IJ site, 16cm length. Stat PCXR ordered. Pt tolerated well. Denies SOB, pain or discomforts. VSS.   1340:  Dr Titus reviewed xray, gave verbal ok to use catheter. Dr JOSE C Ramírez obtained informed consent from pt and wife for hemodialysis. Await HD treatment in Dialysis unit later today. Pt aware, agreeable. Bumex drip due to start now, Dr Ramírez is canceling order as HD will take place today. Pt aware. Lunch served and assisted with tray.    1420:  TRANSFER - OUT REPORT:  Verbal report given to EMORY Smith RN  on Amish Shetty  being transferred to Hemodialysis unit for ordered procedure     Report consisted of patient's Situation, Background, Assessment and   Recommendations(SBAR).   Information from the following report(s) Nurse Handoff Report was reviewed with the receiving nurse.     Lines:   Peripheral IV 04/08/24 Left Antecubital (Active)   Site Assessment Clean, dry & intact 04/12/24 0800   Line Status Capped;Flushed 04/12/24 0800   Line Care Cap changed;Connections checked and tightened;Ports disinfected 04/12/24 0800   Phlebitis Assessment No symptoms 04/12/24 0800   Infiltration Assessment 0 04/12/24 0800   Alcohol Cap Used Yes 04/12/24 0800   Dressing Status Clean, dry & intact 04/12/24 0800   Dressing Type Transparent 04/12/24 0800       Peripheral IV 04/11/24 Left Hand (Active)   Site Assessment Clean, dry & intact 04/12/24 0800   Line Status Capped;Flushed 04/12/24 0800   Line Care Cap changed;Connections checked and tightened;Ports disinfected  change report given to FRANSISCA Monk and FRANSISCA Julio at bedside with rounds.

## 2024-04-12 NOTE — PROGRESS NOTES
1930: Bedside and Verbal shift change report given to FRANSISCA Monk/ Nori RN (oncoming nurse) by FRANSISCA Jolley (offgoing nurse). Report included the following information Nurse Handoff Report, Intake/Output, MAR, Recent Results, Med Rec Status, and Cardiac Rhythm A fib .      1940: Shift assessment is done. VS checked. Pt complains of pain on his left knee, but refused pain medication. No complaint of SOB. Call light within reach.    2112: Scheduled medication given. Pt did not complain any pain and SOB. Call light within reach.    2215: Pt's monitor showed 6 beats V tach. Pt was asymptomatic, alert & awake, and no chest pain. MD was notified.     2330: Reassessment performed. Pt voiced no pain. No complaint of SOB. Call light within reach.    2335: Scheduled med given. Dale light within reach.    0400: Reassessment done. VS checked. Pt was asleep but easily awaken. No complaint of pain and SOB. Call light within reach.    0540: Pt given a bath; gown and bedding changed. Scheduled med given. Call light within reach.     0720: Bedside and Verbal shift change report given to FRANSISCA Jolley (oncoming nurse) by FRANSISCA Monk/ FRANSISCA Julio (offgoing nurse). Report included the following information Nurse Handoff Report, Intake/Output, MAR, Recent Results, Med Rec Status, and Cardiac Rhythm A fib/ Bradycardic .

## 2024-04-12 NOTE — DIALYSIS
HD Care plan  Time: 2.5 hrs  Dialysate:  2 K+   2.5 Ca++  Bath  Net UF: 2 L  Access: Aseptically care for RIJ CVC  Hemodynamic stability: Maintain BP WNL     Pre Dialysis:  Pt received from Unit Nurse Samreen Calderon , pt on a bed, A+O x 4, on RA, no s/s of acute distress noted. RIJ CVC  assessed, no abnormalities noted,  TDC accessed per protocol without any difficulty, line patent with good flow.     Intra Dialysis:  Time out / safety process performed per policy, Tx initiated at 1530.    TDC flowing with ease. For hemodynamic stability UF goal set at 2000 ml as tolerated.  Pt offered assistance with repositioning every 2 hours/prn    Vascular access visible and line connections remained intact throughout entire duration of treatment.   Vital signs checked every 15 mins.WNL     Post Dialysis:  Tx completed at 1800,   Tolerated well , 2 L  removed.  De-accessed per protocol.    Dialysis catheter locked accordingly with Heparin 1.1 ml in arterial port, and 1.1 ml in venous port ,catheter dressing clean, dry and intact.  Post Dialysis report given to unit Nurse Samreen

## 2024-04-12 NOTE — PLAN OF CARE
Problem: Safety - Adult  Goal: Free from fall injury  Outcome: Progressing     Problem: Discharge Planning  Goal: Discharge to home or other facility with appropriate resources  Outcome: Progressing  Flowsheets (Taken 4/11/2024 1940)  Discharge to home or other facility with appropriate resources:   Identify barriers to discharge with patient and caregiver   Identify discharge learning needs (meds, wound care, etc)     Problem: Skin/Tissue Integrity  Goal: Absence of new skin breakdown  Description: 1.  Monitor for areas of redness and/or skin breakdown  2.  Assess vascular access sites hourly  3.  Every 4-6 hours minimum:  Change oxygen saturation probe site  4.  Every 4-6 hours:  If on nasal continuous positive airway pressure, respiratory therapy assess nares and determine need for appliance change or resting period.  Outcome: Progressing     Problem: ABCDS Injury Assessment  Goal: Absence of physical injury  Outcome: Progressing     Problem: Pain  Goal: Verbalizes/displays adequate comfort level or baseline comfort level  Outcome: Progressing  Flowsheets  Taken 4/11/2024 2330 by Princess Evelyn Webster RN  Verbalizes/displays adequate comfort level or baseline comfort level:   Encourage patient to monitor pain and request assistance   Assess pain using appropriate pain scale   Implement non-pharmacological measures as appropriate and evaluate response  Taken 4/11/2024 1940 by Nori Griffin RN  Verbalizes/displays adequate comfort level or baseline comfort level:   Encourage patient to monitor pain and request assistance   Assess pain using appropriate pain scale   Implement non-pharmacological measures as appropriate and evaluate response     Problem: Chronic Conditions and Co-morbidities  Goal: Patient's chronic conditions and co-morbidity symptoms are monitored and maintained or improved  Outcome: Progressing  Flowsheets (Taken 4/11/2024 1940)  Care Plan - Patient's Chronic Conditions and Co-Morbidity

## 2024-04-12 NOTE — PROGRESS NOTES
[facility] Hospitalist [facility]  Progress Note    Patient: Amish Shetty Age: 68 y.o. : 1955 MR#: [id number] SSN: [id number]  Date/Time: 2024    Subjective:     Pt reports some improvement in breathing. Seen w/ g/friend at bedside.      Assessment/Plan:   68 y o male with multiple medical conditions including anemia w/ multiple work ups in the past w/o clear diagnosis admitted after he presented w/ c/o sob    -Sepsis (fever +leukocytosis) unclear source, ? Pulmonary source given respiratory symptoms. Resolved. UA negative, blood cx, urine cx pending Respiratory virus panel negative. Started on CAP abx. Consider 5 day course of abx.  -Acute on chronic iron def anemia: no Gi source identified w/ previous work ups. S/p PRBC transfusion X2. Fecal occult testing +ve this admission per report. Endoscopy cancelled due to orthopnea, to start HD for volume management today w/ goals of more aggressive fluid removal for endoscopy on Monday. Discussed w/ both nephrology and GI consultants.   -Hypokalemia: likely due to renal loss due to lasix, normal mag  -Anasarca  -Acute HFpEF: now on bumex, cardiology and nephrology following, to start HD today for more aggressive fluid removal, discussed w/ nephrology    HISTORY OF:  -HTN  -DM2  -CVA: right hemiplegia  -Afib not on OAC  -Depression  -CKD/proteinuria/amyloidosis      PLAN:  -Fluid management per cardiology and nephrology. To start HD today and over the weekend  -Follow H+H and transfuse PRN  -5 day course of CAP abx  -IV iron  -Replace and follow K  -Possible endoscopy after fluid removal on Monday  -PPI bid  -PT/OT      Dispo: per OT notes, scoring for NOT home discharge, discussed w/ CM. Pt adamant about wanting to go home and not to a rehab facility      Additional Notes:      Case discussed with:  [x]Patient  [x]Family  [x]Nursing  [x]Case Management  DVT Prophylaxis:  []Lovenox  []Hep SQ  [x]SCDs  []Coumadin   []On Heparin

## 2024-04-12 NOTE — PROGRESS NOTES
[facility] - Progress Note    Admit Date: 4/8/2024  Attending Cardiologist: Dr. Chuck Ramírez    Assessment:     -Dyspnea, multifactorial in setting of below.   -Anemia/possible GIB, +FOBT, Hgb 5.9 on admission, s/p transfusions. EGD cancelled d/t orthopnea.   -A/c HFpEF exacerbation, EF 50-55% by TTE 04/2024, 01/2024  -SSS/Persistent Afib with SVR, asymptomatic, historically limiting rate controlling agents. Patient previously declined to be on anticoagulation as outpatient. On ASA only.   -HTN, uncontrolled.   -DM2  -CHIOMA, uses cpap.   -Hx CVA with residual right sided deficits   -ALECT2 Renal Amyloidosis; nephrotic syndrome  -Obesity     Primary cardiologist, Dr. Chuck Ramírez      Plan:       I saw, evaluated, interviewed and examined the patient personally.  Continue with Bumex, hydralazine, Imdur  Continue supportive care    Chuck Ramírez MD       -Diuresis with IV bumex 1 mg BID, appreciate nephrology involvement.   -Continue Hydralazine, Imdur.   -No ACEi/ARB/ARNI/AA/SGLT2i secondary to renal function.  -No BB due to slow ventricular rates, asymptomatic.   -Anemia mgmt per primary team.     Subjective:     Still with pitting edema and orthopnea   Wife at bedside, wants patient to start HD.     Objective:      Patient Vitals for the past 8 hrs:   Temp Pulse Resp BP SpO2   04/12/24 0716 97.9 °F (36.6 °C) (!) 49 20 (!) 155/74 97 %   04/12/24 0553 -- -- -- (!) 148/64 --   04/12/24 0400 97.4 °F (36.3 °C) 52 -- (!) 158/79 97 %         Patient Vitals for the past 96 hrs:   Weight   04/11/24 1405 103 kg (227 lb)   04/11/24 0607 103 kg (227 lb 1.6 oz)   04/10/24 0421 99.5 kg (219 lb 4.8 oz)   04/08/24 0910 104.3 kg (230 lb)                  Current Facility-Administered Medications   Medication Dose Route Frequency    cefTRIAXone (ROCEPHIN) 1,000 mg in sterile water 10 mL IV syringe  1,000 mg IntraVENous Q24H    azithromycin (ZITHROMAX) 500 mg in sodium chloride 0.9% 250 mL (vial-mate/adapter) IVPB  500 mg

## 2024-04-12 NOTE — PROGRESS NOTES
Physical Therapy  Skilled PT evaluation attempt X 2 attempt: pt receiving care 1120 and pt in procedure 1220. Will follow up as schedule allows.     Marcela Cano PT, DPT

## 2024-04-12 NOTE — PROGRESS NOTES
RENAL DAILY PROGRESS NOTE  68-year-old male with past medical history of diabetes, amyloidosis, anemia, CKD admitted for severe anemia, following for renal failure   Subjective:       Complaint:   Overnight events noted  Documented urine output about  700cc  Not able to lie down flat for procedure.     IMPRESSION:   Chronic kidney disease with proteinuria, progressive CKD, status post kidney biopsy which shows ALECT2 amyloidosis, history of diabetes baseline creatinine around 2.8 to 3 mg per DL, progressive decline in kidney function over the last several month   Proteinuria, heavy proteinuria, getting worse  Hypertension, history of uncontrolled hypertension  Anemia, severe anemia on this admission, follows with oncology clinic outpatient has been receiving Epogen and iron infusion, postive FOBT on this admission  HFpEF, fluid overload on this admission, pericardial effusion without tamponade,  afib, not on anticoagulation  Diabetes  History of stroke   PLAN:    His volume status seems challenging to manage with diuretics, suspect he is getting refractory  to diuretics. Discussed option of dialysis with him and wife for volume management to improve his volume status.  He is agreeable if he is going for procedure this Monday.    From renal stand point, I will increase diuretics for now and monitor response. He agrees to proceed with dialysis support if he has to .        Discussed with Dr. Gonzalez.     Addendum;   Case discussed with GI colleague, plan for EGD on Monday   Plan to arrange dialysis over weekend for better volume management.   Will switch bumex drip to stand dose schedule.   Plan for HD with 2K bath, UF goal 2L as tolerated.     Appreciate help from vascular for HD catheter placement.       Current Facility-Administered Medications   Medication Dose Route Frequency    iron sucrose (VENOFER) injection 200 mg  200 mg IntraVENous Q24H    cefTRIAXone (ROCEPHIN) 1,000 mg in sterile water 10 mL IV

## 2024-04-12 NOTE — PLAN OF CARE
Problem: Occupational Therapy - Adult  Goal: By Discharge: Performs self-care activities at highest level of function for planned discharge setting.  See evaluation for individualized goals.  Description: Occupational Therapy Goals:  Initiated 4/11/2024 to be met within 7-10 days.    1.  Patient will perform upper body dressing with minimal assistance/contact guard assist.   2.  Patient will perform lower body dressing with moderate assistance .  3.  Patient will perform functional task in standing for 3 minutes with min assist for balance.  4.  Patient will perform toilet transfers with minimal assistance/contact guard assist.  5.  Patient will perform all aspects of toileting with moderate assistance .   6.  Patient will utilize energy conservation techniques during functional activities with verbal cues.    PLOF: Patient required assist with all basic self care tasks and used a quad cane for functional mobility PTA.      Outcome: Progressing   OCCUPATIONAL THERAPY EVALUATION    Patient: Amish Shetty (68 y.o. male)  Date: 4/11/2024  Primary Diagnosis: Acute decompensated heart failure (HCC) [I50.9]  Procedure(s) (LRB):  ESOPHAGOGASTRODUODENOSCOPY Cancelled (N/A) Day of Surgery   Precautions: Fall Risk,    ASSESSMENT :  Patient presents at a min to mod assist level of care with basic ADLs.  He was able to stand with min to mod assist, given extra time.  Patient limited by right hemiparesis from previous CVA.  He receives assist from his wife at home.  Patient returned to supine in bed with the HOB elevated at the end of the session with all needs met.    DEFICITS/IMPAIRMENTS:  Performance deficits / Impairments: Decreased functional mobility ;Decreased endurance;Decreased ADL status;Decreased ROM;Decreased balance;Decreased strength;Decreased fine motor control;Decreased coordination    Patient will benefit from skilled intervention to address the above impairments.  Patient's rehabilitation potential/Prognosis:  demonstrated understanding.    Pain:  Intensity Pre-treatment: 0/10   Intensity Post-treatment: 0/10  Scale: Numeric Rating Scale    Activity Tolerance:   Activity Tolerance: Patient limited by fatigue  Please refer to the flowsheet for vital signs taken during this treatment.    After treatment:   [] Patient left in no apparent distress sitting up in chair  [x] Patient left in no apparent distress in bed  [x] Call bell left within reach  [x] Nursing notified  [] Caregiver present  [x] Bed alarm activated    COMMUNICATION/EDUCATION:   Patient Education  Education Given To: Patient  Education Provided: Role of Therapy;Plan of Care;Precautions;ADL Adaptive Strategies;Fall Prevention Strategies;Transfer Training  Education Method: Demonstration;Verbal;Teach Back  Barriers to Learning: None  Education Outcome: Verbalized understanding;Continued education needed    Thank you for this referral.  Shiela Cortez MS OTR/L  Minutes: 26    Eval Complexity: Decision Making: Medium Complexity

## 2024-04-13 LAB
ANION GAP SERPL CALC-SCNC: 5 MMOL/L (ref 3–18)
BASOPHILS # BLD: 0 K/UL (ref 0–0.1)
BASOPHILS NFR BLD: 1 % (ref 0–2)
BUN SERPL-MCNC: 42 MG/DL (ref 7–18)
BUN/CREAT SERPL: 17 (ref 12–20)
CALCIUM SERPL-MCNC: 7.7 MG/DL (ref 8.5–10.1)
CHLORIDE SERPL-SCNC: 110 MMOL/L (ref 100–111)
CO2 SERPL-SCNC: 28 MMOL/L (ref 21–32)
CREAT SERPL-MCNC: 2.46 MG/DL (ref 0.6–1.3)
DIFFERENTIAL METHOD BLD: ABNORMAL
EOSINOPHIL # BLD: 0.1 K/UL (ref 0–0.4)
EOSINOPHIL NFR BLD: 2 % (ref 0–5)
ERYTHROCYTE [DISTWIDTH] IN BLOOD BY AUTOMATED COUNT: 16.8 % (ref 11.6–14.5)
GLUCOSE BLD STRIP.AUTO-MCNC: 100 MG/DL (ref 70–110)
GLUCOSE BLD STRIP.AUTO-MCNC: 104 MG/DL (ref 70–110)
GLUCOSE BLD STRIP.AUTO-MCNC: 109 MG/DL (ref 70–110)
GLUCOSE BLD STRIP.AUTO-MCNC: 96 MG/DL (ref 70–110)
GLUCOSE SERPL-MCNC: 85 MG/DL (ref 74–99)
HCT VFR BLD AUTO: 24.7 % (ref 36–48)
HGB BLD-MCNC: 7.5 G/DL (ref 13–16)
IMM GRANULOCYTES # BLD AUTO: 0 K/UL (ref 0–0.04)
IMM GRANULOCYTES NFR BLD AUTO: 1 % (ref 0–0.5)
LYMPHOCYTES # BLD: 1 K/UL (ref 0.9–3.6)
LYMPHOCYTES NFR BLD: 15 % (ref 21–52)
MCH RBC QN AUTO: 26.7 PG (ref 24–34)
MCHC RBC AUTO-ENTMCNC: 30.4 G/DL (ref 31–37)
MCV RBC AUTO: 87.9 FL (ref 78–100)
MONOCYTES # BLD: 0.8 K/UL (ref 0.05–1.2)
MONOCYTES NFR BLD: 13 % (ref 3–10)
NEUTS SEG # BLD: 4.4 K/UL (ref 1.8–8)
NEUTS SEG NFR BLD: 69 % (ref 40–73)
NRBC # BLD: 0 K/UL (ref 0–0.01)
NRBC BLD-RTO: 0 PER 100 WBC
PLATELET # BLD AUTO: 231 K/UL (ref 135–420)
PMV BLD AUTO: 10.7 FL (ref 9.2–11.8)
POTASSIUM SERPL-SCNC: 3.6 MMOL/L (ref 3.5–5.5)
RBC # BLD AUTO: 2.81 M/UL (ref 4.35–5.65)
SODIUM SERPL-SCNC: 143 MMOL/L (ref 136–145)
WBC # BLD AUTO: 6.4 K/UL (ref 4.6–13.2)

## 2024-04-13 PROCEDURE — 6360000002 HC RX W HCPCS: Performed by: INTERNAL MEDICINE

## 2024-04-13 PROCEDURE — 99232 SBSQ HOSP IP/OBS MODERATE 35: CPT | Performed by: INTERNAL MEDICINE

## 2024-04-13 PROCEDURE — 80048 BASIC METABOLIC PNL TOTAL CA: CPT

## 2024-04-13 PROCEDURE — 6370000000 HC RX 637 (ALT 250 FOR IP): Performed by: INTERNAL MEDICINE

## 2024-04-13 PROCEDURE — 6370000000 HC RX 637 (ALT 250 FOR IP): Performed by: STUDENT IN AN ORGANIZED HEALTH CARE EDUCATION/TRAINING PROGRAM

## 2024-04-13 PROCEDURE — 82962 GLUCOSE BLOOD TEST: CPT

## 2024-04-13 PROCEDURE — 2700000000 HC OXYGEN THERAPY PER DAY

## 2024-04-13 PROCEDURE — C9113 INJ PANTOPRAZOLE SODIUM, VIA: HCPCS | Performed by: PHYSICIAN ASSISTANT

## 2024-04-13 PROCEDURE — 2140000001 HC CVICU INTERMEDIATE R&B

## 2024-04-13 PROCEDURE — 90935 HEMODIALYSIS ONE EVALUATION: CPT

## 2024-04-13 PROCEDURE — 94761 N-INVAS EAR/PLS OXIMETRY MLT: CPT

## 2024-04-13 PROCEDURE — 2580000003 HC RX 258: Performed by: INTERNAL MEDICINE

## 2024-04-13 PROCEDURE — A4216 STERILE WATER/SALINE, 10 ML: HCPCS | Performed by: PHYSICIAN ASSISTANT

## 2024-04-13 PROCEDURE — 6370000000 HC RX 637 (ALT 250 FOR IP): Performed by: PHYSICIAN ASSISTANT

## 2024-04-13 PROCEDURE — 2580000003 HC RX 258: Performed by: PHYSICIAN ASSISTANT

## 2024-04-13 PROCEDURE — 6360000002 HC RX W HCPCS: Performed by: PHYSICIAN ASSISTANT

## 2024-04-13 PROCEDURE — 85025 COMPLETE CBC W/AUTO DIFF WBC: CPT

## 2024-04-13 RX ORDER — LOSARTAN POTASSIUM 25 MG/1
25 TABLET ORAL DAILY
Status: DISCONTINUED | OUTPATIENT
Start: 2024-04-13 | End: 2024-04-17 | Stop reason: HOSPADM

## 2024-04-13 RX ADMIN — POTASSIUM CHLORIDE 20 MEQ: 1500 TABLET, EXTENDED RELEASE ORAL at 13:15

## 2024-04-13 RX ADMIN — FINASTERIDE 5 MG: 5 TABLET, FILM COATED ORAL at 20:07

## 2024-04-13 RX ADMIN — ISOSORBIDE DINITRATE 20 MG: 10 TABLET ORAL at 13:15

## 2024-04-13 RX ADMIN — ATORVASTATIN CALCIUM 40 MG: 40 TABLET, FILM COATED ORAL at 20:07

## 2024-04-13 RX ADMIN — AMLODIPINE BESYLATE 10 MG: 10 TABLET ORAL at 15:09

## 2024-04-13 RX ADMIN — PANTOPRAZOLE SODIUM 40 MG: 40 INJECTION, POWDER, FOR SOLUTION INTRAVENOUS at 20:25

## 2024-04-13 RX ADMIN — ISOSORBIDE DINITRATE 20 MG: 10 TABLET ORAL at 20:06

## 2024-04-13 RX ADMIN — AZITHROMYCIN DIHYDRATE 500 MG: 500 INJECTION, POWDER, LYOPHILIZED, FOR SOLUTION INTRAVENOUS at 13:12

## 2024-04-13 RX ADMIN — HYDRALAZINE HYDROCHLORIDE 100 MG: 50 TABLET ORAL at 20:16

## 2024-04-13 RX ADMIN — WATER 1000 MG: 1 INJECTION INTRAMUSCULAR; INTRAVENOUS; SUBCUTANEOUS at 13:13

## 2024-04-13 RX ADMIN — BUMETANIDE 1 MG: 0.25 INJECTION INTRAMUSCULAR; INTRAVENOUS at 08:43

## 2024-04-13 RX ADMIN — POTASSIUM CHLORIDE 20 MEQ: 1500 TABLET, EXTENDED RELEASE ORAL at 20:06

## 2024-04-13 RX ADMIN — POTASSIUM CHLORIDE 20 MEQ: 1500 TABLET, EXTENDED RELEASE ORAL at 08:43

## 2024-04-13 RX ADMIN — ISOSORBIDE DINITRATE 20 MG: 10 TABLET ORAL at 08:43

## 2024-04-13 RX ADMIN — SERTRALINE HYDROCHLORIDE 50 MG: 50 TABLET ORAL at 08:43

## 2024-04-13 RX ADMIN — HYDRALAZINE HYDROCHLORIDE 100 MG: 50 TABLET ORAL at 04:54

## 2024-04-13 RX ADMIN — PANTOPRAZOLE SODIUM 40 MG: 40 INJECTION, POWDER, FOR SOLUTION INTRAVENOUS at 13:12

## 2024-04-13 RX ADMIN — IRON SUCROSE 200 MG: 20 INJECTION, SOLUTION INTRAVENOUS at 13:14

## 2024-04-13 RX ADMIN — HYDRALAZINE HYDROCHLORIDE 100 MG: 50 TABLET ORAL at 13:14

## 2024-04-13 RX ADMIN — BUMETANIDE 1 MG: 0.25 INJECTION INTRAMUSCULAR; INTRAVENOUS at 17:47

## 2024-04-13 RX ADMIN — LOSARTAN POTASSIUM 25 MG: 25 TABLET, FILM COATED ORAL at 15:09

## 2024-04-13 ASSESSMENT — PAIN SCALES - GENERAL
PAINLEVEL_OUTOF10: 0

## 2024-04-13 NOTE — PROGRESS NOTES
Agree w plans for conservative management in the absence of overt bleeding given his co morbidities. HD today. Will re-evaluate on Monday for timing of possible EGD. [place] last 2019 as noted prior.    Jean Carlos Piedra MD

## 2024-04-13 NOTE — PROGRESS NOTES
[facility]  Progress Note    Patient: Amish Shetty Age: 68 y.o. : 1955 MR#: [id number] SSN: [id number]  Date/Time: 2024    Subjective:     Patient seen at hemodialysis  Patient is alert awake oriented comfortable no distress  Denies any chest pain palpitation shortness of breath no cough n no nausea vomiting diarrhea no fever chills    Assessment/Plan:   68 y o male with multiple medical conditions including anemia w/ multiple work ups in the past w/o clear diagnosis admitted after he presented w/ c/o sob    -Sepsis (fever +leukocytosis) unclear source, ? Pulmonary source given respiratory symptoms. Resolved. UA negative, blood cx, urine cx pending Respiratory virus panel negative. Started on CAP abx. Consider 5 day course of abx.  -Acute on chronic iron def anemia: no Gi source identified w/ previous work ups. S/p PRBC transfusion X2. Fecal occult testing +ve this admission per report. Endoscopy cancelled due to orthopnea, to start HD for volume management today w/ goals of more aggressive fluid removal for endoscopy on Monday. Discussed w/ both nephrology and GI consultants.   -Hypokalemia: likely due to renal loss due to lasix, normal mag  -Anasarca  -Acute HFpEF: now on bumex, cardiology and nephrology following, to start HD today for more aggressive fluid removal, discussed w/ nephrology    HISTORY OF:  -HTN  -DM2  -CVA: right hemiplegia  -Afib not on OAC  -Depression  -CKD/proteinuria/s/p Biopsy = ALECT2 amyloidosis,       PLAN:  2024  -Stable patient  -Continue hemodialysis as ordered  -Monitor electrolytes  -Vitals: Blood pressure on higher side with some bradycardia arrhythmia-avoid beta-blockers or negative inotropes as patient has bradycardia arrhythmia, add low-dose ARB.  - H/H stable so far - continue to monitor , GI recs reviewed       2024  -Fluid management per cardiology and nephrology. To start HD today and over the weekend  -Follow

## 2024-04-13 NOTE — PLAN OF CARE
Problem: Safety - Adult  Goal: Free from fall injury  Outcome: Progressing     Problem: Discharge Planning  Goal: Discharge to home or other facility with appropriate resources  Outcome: Progressing     Problem: Skin/Tissue Integrity  Goal: Absence of new skin breakdown  Description: 1.  Monitor for areas of redness and/or skin breakdown  2.  Assess vascular access sites hourly  3.  Every 4-6 hours minimum:  Change oxygen saturation probe site  4.  Every 4-6 hours:  If on nasal continuous positive airway pressure, respiratory therapy assess nares and determine need for appliance change or resting period.  Outcome: Progressing     Problem: ABCDS Injury Assessment  Goal: Absence of physical injury  Outcome: Progressing  Flowsheets (Taken 4/13/2024 0858)  Absence of Physical Injury: Implement safety measures based on patient assessment     Problem: Pain  Goal: Verbalizes/displays adequate comfort level or baseline comfort level  Outcome: Progressing     Problem: Chronic Conditions and Co-morbidities  Goal: Patient's chronic conditions and co-morbidity symptoms are monitored and maintained or improved  4/13/2024 1351 by Coral Hinojosa RN  Outcome: Progressing  4/13/2024 0956 by Waleska Farley RN  Outcome: Progressing  Flowsheets (Taken 4/13/2024 0956)  Care Plan - Patient's Chronic Conditions and Co-Morbidity Symptoms are Monitored and Maintained or Improved:   Monitor and assess patient's chronic conditions and comorbid symptoms for stability, deterioration, or improvement   Collaborate with multidisciplinary team to address chronic and comorbid conditions and prevent exacerbation or deterioration   Update acute care plan with appropriate goals if chronic or comorbid symptoms are exacerbated and prevent overall improvement and discharge

## 2024-04-13 NOTE — PROGRESS NOTES
RENAL DAILY PROGRESS NOTE  68-year-old male with past medical history of diabetes, amyloidosis, anemia, CKD admitted for severe anemia, following for renal failure   Subjective:       Complaint:   Overnight events noted  Documented urine output about 900cc  Received first dialysis yesterday  UF about 2.5L, tolerated well   Examined during dialysis   His breathing is improving    IMPRESSION:   Chronic kidney disease with proteinuria, progressive CKD, status post kidney biopsy which shows ALECT2 amyloidosis, history of diabetes baseline creatinine around 2.8 to 3 mg per DL, progressive decline in kidney function over the last several month   Proteinuria, heavy proteinuria, getting worse  Hypertension, history of uncontrolled hypertension  Anemia, severe anemia on this admission, follows with oncology clinic outpatient has been receiving Epogen and iron infusion, postive FOBT on this admission  HFpEF, fluid overload on this admission, pericardial effusion without tamponade,  afib, not on anticoagulation  Diabetes  History of stroke   PLAN:   Plan for HD today with 3K bath, UF goal 2-2.5L , will re evaluate for HD tomorrow.   Continue bumex.            Current Facility-Administered Medications   Medication Dose Route Frequency    iron sucrose (VENOFER) injection 200 mg  200 mg IntraVENous Q24H    bumetanide (BUMEX) injection 1 mg  1 mg IntraVENous BID    cefTRIAXone (ROCEPHIN) 1,000 mg in sterile water 10 mL IV syringe  1,000 mg IntraVENous Q24H    azithromycin (ZITHROMAX) 500 mg in sodium chloride 0.9% 250 mL (vial-mate/adapter) IVPB  500 mg IntraVENous Q24H    0.9 % sodium chloride infusion   IntraVENous PRN    pantoprazole (PROTONIX) 40 mg in sodium chloride (PF) 0.9 % 10 mL injection  40 mg IntraVENous Q12H    hydrALAZINE (APRESOLINE) tablet 100 mg  100 mg Oral 3 times per day    potassium chloride (KLOR-CON M) extended release tablet 20 mEq  20 mEq Oral TID    isosorbide dinitrate (ISORDIL) tablet 20 mg  20

## 2024-04-13 NOTE — PROGRESS NOTES
0750 Received report from Nori RN &  RN. Patient alert and oriented x 4. Denies any pain at this time. Call bell placed within reach.    0920 Patient off the floor to hemodialysis. SBAR given to Waleska.     1330 Patient back from Hemodialysis, report received from Waleska. Medications given. Patient denies any pain at this time.Call bell placed within reach.    1600 Patient's SBP in the 170's - Dr Booker aware. No PRN meds ordered.    1956 Bedside shift change report given to Eloisa GONGORA (oncoming nurse). Report included the following information Nurse handoff report.

## 2024-04-13 NOTE — PROGRESS NOTES
Attempted PT evaluation; day 4 of attempts d/t medical procedures. Currently off floor at HD. Will follow up.

## 2024-04-13 NOTE — PLAN OF CARE
INTERVENTION:  HEMODYNAMIC STABILIZATION  MAINTAIN BP WNL WHILE ON HD.    INTERVENTION:  FLUID MANAGEMENT  WILL ATTEMPT 3000 ML TOTAL FLUID REMOVAL AS TOLERATED.    INTERVENTION:  METABOLIC/ELECTROLYTE MANAGEMENT  3.0 POTASSIUM 2.5 CALCIUM DIALYSATE USED WITH HD TODAY.    INTERVENTION:  HEMODIALYSIS ACCESS SITE MANAGEMENT  RIGHT IJ TRIALYSIS  ACCESSED USING ASEPTIC TECHNIQUE.    GOAL:  SIGNS AND SYMPTOMS OF LISTED POTENTIAL PROBLEMS WILL BE ABSENT OR MANAGEABLE.    OUTCOME:  PROGRESSING.    HD PLANNED FOR 3 HOURS TODAY.        Problem: Chronic Conditions and Co-morbidities  Goal: Patient's chronic conditions and co-morbidity symptoms are monitored and maintained or improved  Outcome: Progressing  Flowsheets (Taken 4/13/2024 7884)  Care Plan - Patient's Chronic Conditions and Co-Morbidity Symptoms are Monitored and Maintained or Improved:   Monitor and assess patient's chronic conditions and comorbid symptoms for stability, deterioration, or improvement   Collaborate with multidisciplinary team to address chronic and comorbid conditions and prevent exacerbation or deterioration   Update acute care plan with appropriate goals if chronic or comorbid symptoms are exacerbated and prevent overall improvement and discharge

## 2024-04-13 NOTE — PROGRESS NOTES
Received pre HD report from  ERIN Hinojosa, RN.      Pt in bed, A+O x4, on O2 via NC @ 3L, no s/s of distress noted.      Accessed right IJ Trialysis w/o complications.  Tx initiated at 0940.      CVC flowing with ease.  For hemodynamic stability UF goal 3000 ml.  Offered assistance with repositioning every 2 hours.  Vascular access visible at all times during treatment, line connections intact at all times.      Tx completed at 1241, tolerated well 2.5L removed.  De-accessed per protocol.    Heparin indwell 1.1ml in arterial, and 1.1ml in venous catheter.      Unit nurse ERIN Hinojosa, RN given report.

## 2024-04-13 NOTE — PROGRESS NOTES
1900: Bedside and Verbal shift change report given to FRANSISCA Julio/ FRANSISCA Monk (oncoming nurse) by FRANSISCA Jolley (offgoing nurse). Report included the following information Nurse Handoff Report, Intake/Output, MAR, Recent Results, Med Rec Status, and Cardiac Rhythm Afib .      1930: Assessment performed. Pt is in bed, alert & oriented. VS checked. No complaint of pain & SOB. Call light within reach.      2123: Scheduled meds given. Pt is now on Cpap. Pt has no complain of pain.     2330: Reassessment done. No changes from previous assessment. Pt asleep, easily awaken. No distress noted at this time. Call light within reach.     0400: Reassessment done. No changes from previous assessment. Pt asleep, easily awaken. No distress noted at this time. CHG bathe given, complete bed changed, incontinence care done. Call light within reach.    0454: Scheduled med given. Accessed central line to draw blood. Blood sample sent to lab.    0740: Bedside and Verbal shift change report given to FRANSISCA Moncada (oncoming nurse) by FRANSISCA Monk/ FRANSISCA Julio (offgoing nurse). Report included the following information Nurse Handoff Report, Intake/Output, MAR, Recent Results, Med Rec Status, and Cardiac Rhythm Afib/  Bradycardia .

## 2024-04-14 LAB
ANION GAP SERPL CALC-SCNC: 3 MMOL/L (ref 3–18)
BACTERIA SPEC CULT: ABNORMAL
BASOPHILS # BLD: 0.1 K/UL (ref 0–0.1)
BASOPHILS NFR BLD: 1 % (ref 0–2)
BUN SERPL-MCNC: 27 MG/DL (ref 7–18)
BUN/CREAT SERPL: 13 (ref 12–20)
CALCIUM SERPL-MCNC: 7.6 MG/DL (ref 8.5–10.1)
CC UR VC: ABNORMAL
CHLORIDE SERPL-SCNC: 111 MMOL/L (ref 100–111)
CO2 SERPL-SCNC: 29 MMOL/L (ref 21–32)
CREAT SERPL-MCNC: 2.02 MG/DL (ref 0.6–1.3)
DIFFERENTIAL METHOD BLD: ABNORMAL
EOSINOPHIL # BLD: 0.1 K/UL (ref 0–0.4)
EOSINOPHIL NFR BLD: 2 % (ref 0–5)
ERYTHROCYTE [DISTWIDTH] IN BLOOD BY AUTOMATED COUNT: 16.5 % (ref 11.6–14.5)
GLUCOSE BLD STRIP.AUTO-MCNC: 111 MG/DL (ref 70–110)
GLUCOSE BLD STRIP.AUTO-MCNC: 117 MG/DL (ref 70–110)
GLUCOSE BLD STRIP.AUTO-MCNC: 117 MG/DL (ref 70–110)
GLUCOSE BLD STRIP.AUTO-MCNC: 90 MG/DL (ref 70–110)
GLUCOSE SERPL-MCNC: 84 MG/DL (ref 74–99)
HCT VFR BLD AUTO: 25.4 % (ref 36–48)
HGB BLD-MCNC: 7.5 G/DL (ref 13–16)
IMM GRANULOCYTES # BLD AUTO: 0.1 K/UL (ref 0–0.04)
IMM GRANULOCYTES NFR BLD AUTO: 1 % (ref 0–0.5)
LYMPHOCYTES # BLD: 1.1 K/UL (ref 0.9–3.6)
LYMPHOCYTES NFR BLD: 17 % (ref 21–52)
MCH RBC QN AUTO: 26.3 PG (ref 24–34)
MCHC RBC AUTO-ENTMCNC: 29.5 G/DL (ref 31–37)
MCV RBC AUTO: 89.1 FL (ref 78–100)
MONOCYTES # BLD: 0.8 K/UL (ref 0.05–1.2)
MONOCYTES NFR BLD: 12 % (ref 3–10)
NEUTS SEG # BLD: 4.5 K/UL (ref 1.8–8)
NEUTS SEG NFR BLD: 67 % (ref 40–73)
NRBC # BLD: 0 K/UL (ref 0–0.01)
NRBC BLD-RTO: 0 PER 100 WBC
PLATELET # BLD AUTO: 225 K/UL (ref 135–420)
PMV BLD AUTO: 10.8 FL (ref 9.2–11.8)
POTASSIUM SERPL-SCNC: 3.7 MMOL/L (ref 3.5–5.5)
RBC # BLD AUTO: 2.85 M/UL (ref 4.35–5.65)
RBC MORPH BLD: ABNORMAL
RBC MORPH BLD: ABNORMAL
SERVICE CMNT-IMP: ABNORMAL
SODIUM SERPL-SCNC: 143 MMOL/L (ref 136–145)
WBC # BLD AUTO: 6.7 K/UL (ref 4.6–13.2)

## 2024-04-14 PROCEDURE — 6360000002 HC RX W HCPCS: Performed by: INTERNAL MEDICINE

## 2024-04-14 PROCEDURE — 2580000003 HC RX 258: Performed by: PHYSICIAN ASSISTANT

## 2024-04-14 PROCEDURE — 82962 GLUCOSE BLOOD TEST: CPT

## 2024-04-14 PROCEDURE — A4216 STERILE WATER/SALINE, 10 ML: HCPCS | Performed by: PHYSICIAN ASSISTANT

## 2024-04-14 PROCEDURE — 2700000000 HC OXYGEN THERAPY PER DAY

## 2024-04-14 PROCEDURE — 85025 COMPLETE CBC W/AUTO DIFF WBC: CPT

## 2024-04-14 PROCEDURE — 6370000000 HC RX 637 (ALT 250 FOR IP): Performed by: INTERNAL MEDICINE

## 2024-04-14 PROCEDURE — C9113 INJ PANTOPRAZOLE SODIUM, VIA: HCPCS | Performed by: PHYSICIAN ASSISTANT

## 2024-04-14 PROCEDURE — 99232 SBSQ HOSP IP/OBS MODERATE 35: CPT | Performed by: INTERNAL MEDICINE

## 2024-04-14 PROCEDURE — 2140000001 HC CVICU INTERMEDIATE R&B

## 2024-04-14 PROCEDURE — 6370000000 HC RX 637 (ALT 250 FOR IP): Performed by: STUDENT IN AN ORGANIZED HEALTH CARE EDUCATION/TRAINING PROGRAM

## 2024-04-14 PROCEDURE — 94761 N-INVAS EAR/PLS OXIMETRY MLT: CPT

## 2024-04-14 PROCEDURE — 6360000002 HC RX W HCPCS: Performed by: PHYSICIAN ASSISTANT

## 2024-04-14 PROCEDURE — 90935 HEMODIALYSIS ONE EVALUATION: CPT

## 2024-04-14 PROCEDURE — 80048 BASIC METABOLIC PNL TOTAL CA: CPT

## 2024-04-14 PROCEDURE — 2580000003 HC RX 258: Performed by: INTERNAL MEDICINE

## 2024-04-14 PROCEDURE — 6370000000 HC RX 637 (ALT 250 FOR IP): Performed by: PHYSICIAN ASSISTANT

## 2024-04-14 RX ORDER — ISOSORBIDE DINITRATE 10 MG/1
40 TABLET ORAL 3 TIMES DAILY
Status: DISCONTINUED | OUTPATIENT
Start: 2024-04-14 | End: 2024-04-17 | Stop reason: HOSPADM

## 2024-04-14 RX ADMIN — ISOSORBIDE DINITRATE 20 MG: 10 TABLET ORAL at 08:22

## 2024-04-14 RX ADMIN — AMLODIPINE BESYLATE 10 MG: 10 TABLET ORAL at 17:31

## 2024-04-14 RX ADMIN — ATORVASTATIN CALCIUM 40 MG: 40 TABLET, FILM COATED ORAL at 20:55

## 2024-04-14 RX ADMIN — POTASSIUM CHLORIDE 20 MEQ: 1500 TABLET, EXTENDED RELEASE ORAL at 13:28

## 2024-04-14 RX ADMIN — FINASTERIDE 5 MG: 5 TABLET, FILM COATED ORAL at 20:54

## 2024-04-14 RX ADMIN — IRON SUCROSE 200 MG: 20 INJECTION, SOLUTION INTRAVENOUS at 10:41

## 2024-04-14 RX ADMIN — PANTOPRAZOLE SODIUM 40 MG: 40 INJECTION, POWDER, FOR SOLUTION INTRAVENOUS at 20:56

## 2024-04-14 RX ADMIN — HYDRALAZINE HYDROCHLORIDE 100 MG: 50 TABLET ORAL at 20:54

## 2024-04-14 RX ADMIN — LOSARTAN POTASSIUM 25 MG: 25 TABLET, FILM COATED ORAL at 17:31

## 2024-04-14 RX ADMIN — BUMETANIDE 1 MG: 0.25 INJECTION INTRAMUSCULAR; INTRAVENOUS at 17:32

## 2024-04-14 RX ADMIN — HYDRALAZINE HYDROCHLORIDE 100 MG: 50 TABLET ORAL at 17:30

## 2024-04-14 RX ADMIN — SERTRALINE HYDROCHLORIDE 50 MG: 50 TABLET ORAL at 08:23

## 2024-04-14 RX ADMIN — ISOSORBIDE DINITRATE 40 MG: 10 TABLET ORAL at 20:54

## 2024-04-14 RX ADMIN — PANTOPRAZOLE SODIUM 40 MG: 40 INJECTION, POWDER, FOR SOLUTION INTRAVENOUS at 08:22

## 2024-04-14 RX ADMIN — AZITHROMYCIN DIHYDRATE 500 MG: 500 INJECTION, POWDER, LYOPHILIZED, FOR SOLUTION INTRAVENOUS at 10:41

## 2024-04-14 RX ADMIN — WATER 1000 MG: 1 INJECTION INTRAMUSCULAR; INTRAVENOUS; SUBCUTANEOUS at 10:42

## 2024-04-14 RX ADMIN — POTASSIUM CHLORIDE 20 MEQ: 1500 TABLET, EXTENDED RELEASE ORAL at 08:22

## 2024-04-14 RX ADMIN — ISOSORBIDE DINITRATE 40 MG: 10 TABLET ORAL at 13:28

## 2024-04-14 RX ADMIN — POTASSIUM CHLORIDE 20 MEQ: 1500 TABLET, EXTENDED RELEASE ORAL at 20:54

## 2024-04-14 RX ADMIN — BUMETANIDE 1 MG: 0.25 INJECTION INTRAMUSCULAR; INTRAVENOUS at 08:22

## 2024-04-14 RX ADMIN — HYDRALAZINE HYDROCHLORIDE 100 MG: 50 TABLET ORAL at 05:27

## 2024-04-14 ASSESSMENT — PAIN SCALES - GENERAL
PAINLEVEL_OUTOF10: 0

## 2024-04-14 NOTE — PLAN OF CARE
Problem: Chronic Conditions and Co-morbidities  Goal: Patient's chronic conditions and co-morbidity symptoms are monitored and maintained or improved  Outcome: Progressing     INTERVENTION:  HEMODYNAMIC STABILIZATION  MAINTAIN BP WNL WHILE ON HD.    INTERVENTION:  FLUID MANAGEMENT  WILL ATTEMPT 2000 ML TOTAL FLUID REMOVAL AS TOLERATED.    INTERVENTION:  METABOLIC/ELECTROLYTE MANAGEMENT  3.0 POTASSIUM 2.5 CALCIUM DIALYSATE USED WITH HD TODAY.    INTERVENTION:  HEMODIALYSIS ACCESS SITE MANAGEMENT  RIJ TDC ACCESSED USING ASEPTIC TECHNIQUE.    GOAL:  SIGNS AND SYMPTOMS OF LISTED POTENTIAL PROBLEMS WILL BE ABSENT OR MANAGEABLE.    OUTCOME:  PROGRESSING.    HD PLANNED FOR 2 HOURS TODAY.

## 2024-04-14 NOTE — PROGRESS NOTES
RENAL DAILY PROGRESS NOTE  68-year-old male with past medical history of diabetes, amyloidosis, anemia, CKD admitted for severe anemia, following for renal failure   Subjective:       Complaint:   Overnight events noted  His breathing continue to improve with dialysis support  Documented urine output about 1400cc  Tolerated HD well   Blood pressure on higher side,   Awaiting for EGD    IMPRESSION:   Chronic kidney disease with proteinuria, progressive CKD, status post kidney biopsy which shows ALECT2 amyloidosis, history of diabetes baseline creatinine around 2.8 to 3 mg per DL, progressive decline in kidney function over the last several month   Proteinuria, heavy proteinuria, getting worse  Hypertension, history of uncontrolled hypertension  Anemia, severe anemia on this admission, follows with oncology clinic outpatient has been receiving Epogen and iron infusion, postive FOBT on this admission  HFpEF, fluid overload on this admission, pericardial effusion without tamponade,  afib, not on anticoagulation  Diabetes  History of stroke   PLAN:   Plan for HD today with 3K bath, UF goal 2L duration for 2 hrs  Plan for HD tomorrow morning.  Continue bumex.            Current Facility-Administered Medications   Medication Dose Route Frequency    isosorbide dinitrate (ISORDIL) tablet 40 mg  40 mg Oral TID    losartan (COZAAR) tablet 25 mg  25 mg Oral Daily    bumetanide (BUMEX) injection 1 mg  1 mg IntraVENous BID    cefTRIAXone (ROCEPHIN) 1,000 mg in sterile water 10 mL IV syringe  1,000 mg IntraVENous Q24H    azithromycin (ZITHROMAX) 500 mg in sodium chloride 0.9% 250 mL (vial-mate/adapter) IVPB  500 mg IntraVENous Q24H    0.9 % sodium chloride infusion   IntraVENous PRN    pantoprazole (PROTONIX) 40 mg in sodium chloride (PF) 0.9 % 10 mL injection  40 mg IntraVENous Q12H    hydrALAZINE (APRESOLINE) tablet 100 mg  100 mg Oral 3 times per day    potassium chloride (KLOR-CON M) extended release tablet 20 mEq  20

## 2024-04-14 NOTE — PROGRESS NOTES
0710 Received report from Eloisa GONGORA. Patient alert and oriented.       1415 Patient off the floor to hemodialysis.     1700 Patient back from Hemodialysis. Assisted to use the BSC and back in the bed. Dinner tray given.    1928 Bedside shift change report given to Eloisa GONGORA (oncoming nurse). Report included the following information Nurse handoff report.

## 2024-04-14 NOTE — PROGRESS NOTES
[facility] - Progress Note    Admit Date: 4/8/2024  Attending Cardiologist: Dr. Chuck Ramírez    Assessment:     -Dyspnea, multifactorial in setting of below.   -Anemia/possible GIB, +FOBT, Hgb 5.9 on admission, s/p transfusions. EGD cancelled d/t orthopnea.   -A/c HFpEF exacerbation, EF 50-55% by TTE 04/2024, 01/2024  -SSS/Persistent Afib with SVR, asymptomatic, historically limiting rate controlling agents. Patient previously declined to be on anticoagulation as outpatient. On ASA only.   -HTN, uncontrolled.   -DM2  -CHIOMA, uses cpap.   -Hx CVA with residual right sided deficits   -ALECT2 Renal Amyloidosis; nephrotic syndrome  -Obesity     Primary cardiologist, Dr. Chuck Ramírez      Plan:     -Diuresis with IV bumex 1 mg BID, appreciate nephrology involvement. Got HD yesterday again  -Continue Hydralazine, Imdur.  Will increase Isordil to 40 mg 3 times daily.  Started on losartan yesterday by primary team  -No ACEi/ARB/ARNI/AA/SGLT2i secondary to renal function.  -No BB due to slow ventricular rates, asymptomatic.   -Anemia mgmt per primary team.     No further cardiac work up planned at this time unless clinical status changes. Call us back if needed. Will be available as needed. Will need to follow up in cardiology clinic in 2-3 weeks after discharge. Thank you.    Subjective:      pitting edema and orthopnea Overall improving  HD yesterday    Objective:      Patient Vitals for the past 8 hrs:   Temp Pulse Resp BP SpO2   04/14/24 0721 97.3 °F (36.3 °C) 59 16 (!) 179/77 97 %   04/14/24 0530 -- -- -- (!) 160/74 --   04/14/24 0313 97.7 °F (36.5 °C) 55 18 (!) 182/80 96 %           Patient Vitals for the past 96 hrs:   Weight   04/14/24 0530 102.1 kg (225 lb 1.6 oz)   04/13/24 1243 99.1 kg (218 lb 8 oz)   04/13/24 0936 100.8 kg (222 lb 3.2 oz)   04/11/24 1405 103 kg (227 lb)   04/11/24 0607 103 kg (227 lb 1.6 oz)                  Current Facility-Administered Medications   Medication Dose Route Frequency

## 2024-04-14 NOTE — PROGRESS NOTES
Received pre HD report from ERIN Hinojosa RN.      Pt in bed, A+O x4, no s/s of distress noted. Oxygen via nasal cannula.    Accessed right CVC w/o complications.  Tx initiated at 1437.      CVC flowing with ease.  For hemodynamic stability UF goal 2500 ml.  Offered assistance with repositioning every 2 hours.  Vascular access visible at all times during treatment, line connections intact at all times.      Tx completed at 1637,  tolerated well 2L removed.  De-accessed per protocol.    Heparin indwell 1.1ml in arterial lumen and 1.1ml in venous lumen.      Post HD report given to ERIN Hinojosa RN.

## 2024-04-14 NOTE — PROGRESS NOTES
[facility]  Progress Note    Patient: Amish Shetty Age: 68 y.o. : 1955 MR#: [id number] SSN: [id number]  Date/Time: 2024    Subjective:     Patient lying in bed alert awake oriented  Patient is asking about hemodialysis  Patient will get hemodialysis today again  Patient denies any other new symptomatology no chest pain no palpitation no shortness of breath no nausea vomiting diarrhea.  Assessment/Plan:   68 y o male with multiple medical conditions including anemia w/ multiple work ups in the past w/o clear diagnosis admitted after he presented w/ c/o sob    -Sepsis (fever +leukocytosis) unclear source, ? Pulmonary source given respiratory symptoms. Resolved. UA negative, blood cx, urine cx pending Respiratory virus panel negative. Started on CAP abx. Consider 5 day course of abx.  -Acute on chronic iron def anemia: no Gi source identified w/ previous work ups. S/p PRBC transfusion X2. Fecal occult testing +ve this admission per report. Endoscopy cancelled due to orthopnea, to start HD for volume management today w/ goals of more aggressive fluid removal for endoscopy on Monday. Discussed w/ both nephrology and GI consultants.   -Hypokalemia: likely due to renal loss due to lasix, normal mag  -Anasarca  -Acute HFpEF: now on bumex, cardiology and nephrology following, to start HD today for more aggressive fluid removal, discussed w/ nephrology    HISTORY OF:  -HTN  -DM2  -CVA: right hemiplegia  -Afib not on OAC  -Depression  -CKD/proteinuria/s/p Biopsy = ALECT2 amyloidosis,       PLAN:  2024  -Patient is getting successive dialysis, he is feeling fine  -Case discussed with renal-as above  -Continue current antibiotic  -Blood pressure adequate continue current medication      2024  -Stable patient  -Continue hemodialysis as ordered  -Monitor electrolytes  -Vitals: Blood pressure on higher side with some bradycardia arrhythmia-avoid beta-blockers or

## 2024-04-14 NOTE — PROGRESS NOTES
Bedside and Verbal shift change report given to FRANSISCA Amin (oncoming nurse) by FRANSISCA Moncada (offgoing nurse). Report included the following information Nurse Handoff Report, Recent Results, and Cardiac Rhythm A fib .

## 2024-04-15 ENCOUNTER — ANESTHESIA (OUTPATIENT)
Facility: HOSPITAL | Age: 69
DRG: 291 | End: 2024-04-15
Payer: MEDICARE

## 2024-04-15 ENCOUNTER — ANESTHESIA EVENT (OUTPATIENT)
Facility: HOSPITAL | Age: 69
DRG: 291 | End: 2024-04-15
Payer: MEDICARE

## 2024-04-15 LAB
BASOPHILS # BLD: 0 K/UL (ref 0–0.1)
BASOPHILS NFR BLD: 0 % (ref 0–2)
DIFFERENTIAL METHOD BLD: ABNORMAL
EOSINOPHIL # BLD: 0.2 K/UL (ref 0–0.4)
EOSINOPHIL NFR BLD: 2 % (ref 0–5)
ERYTHROCYTE [DISTWIDTH] IN BLOOD BY AUTOMATED COUNT: 16.2 % (ref 11.6–14.5)
GLUCOSE BLD STRIP.AUTO-MCNC: 125 MG/DL (ref 70–110)
GLUCOSE BLD STRIP.AUTO-MCNC: 132 MG/DL (ref 70–110)
GLUCOSE BLD STRIP.AUTO-MCNC: 83 MG/DL (ref 70–110)
GLUCOSE BLD STRIP.AUTO-MCNC: 91 MG/DL (ref 70–110)
HBV SURFACE AB SER QL IA: NEGATIVE
HBV SURFACE AB SERPL IA-ACNC: 8.2 MIU/ML
HCT VFR BLD AUTO: 25.5 % (ref 36–48)
HEP BS AB COMMENT: ABNORMAL
HGB BLD-MCNC: 7.8 G/DL (ref 13–16)
IMM GRANULOCYTES # BLD AUTO: 0.1 K/UL (ref 0–0.04)
IMM GRANULOCYTES NFR BLD AUTO: 1 % (ref 0–0.5)
LYMPHOCYTES # BLD: 1 K/UL (ref 0.9–3.6)
LYMPHOCYTES NFR BLD: 8 % (ref 21–52)
MCH RBC QN AUTO: 26.7 PG (ref 24–34)
MCHC RBC AUTO-ENTMCNC: 30.6 G/DL (ref 31–37)
MCV RBC AUTO: 87.3 FL (ref 78–100)
MONOCYTES # BLD: 1 K/UL (ref 0.05–1.2)
MONOCYTES NFR BLD: 8 % (ref 3–10)
NEUTS SEG # BLD: 9.8 K/UL (ref 1.8–8)
NEUTS SEG NFR BLD: 81 % (ref 40–73)
NRBC # BLD: 0 K/UL (ref 0–0.01)
NRBC BLD-RTO: 0 PER 100 WBC
PLATELET # BLD AUTO: 209 K/UL (ref 135–420)
PMV BLD AUTO: 10.3 FL (ref 9.2–11.8)
RBC # BLD AUTO: 2.92 M/UL (ref 4.35–5.65)
WBC # BLD AUTO: 12.1 K/UL (ref 4.6–13.2)

## 2024-04-15 PROCEDURE — 6360000002 HC RX W HCPCS: Performed by: INTERNAL MEDICINE

## 2024-04-15 PROCEDURE — 2580000003 HC RX 258: Performed by: PHYSICIAN ASSISTANT

## 2024-04-15 PROCEDURE — 2700000000 HC OXYGEN THERAPY PER DAY

## 2024-04-15 PROCEDURE — 90935 HEMODIALYSIS ONE EVALUATION: CPT

## 2024-04-15 PROCEDURE — 6370000000 HC RX 637 (ALT 250 FOR IP): Performed by: PHYSICIAN ASSISTANT

## 2024-04-15 PROCEDURE — C9113 INJ PANTOPRAZOLE SODIUM, VIA: HCPCS | Performed by: PHYSICIAN ASSISTANT

## 2024-04-15 PROCEDURE — 2709999900 HC NON-CHARGEABLE SUPPLY: Performed by: INTERNAL MEDICINE

## 2024-04-15 PROCEDURE — 3600007502: Performed by: INTERNAL MEDICINE

## 2024-04-15 PROCEDURE — 85025 COMPLETE CBC W/AUTO DIFF WBC: CPT

## 2024-04-15 PROCEDURE — 94761 N-INVAS EAR/PLS OXIMETRY MLT: CPT

## 2024-04-15 PROCEDURE — 82962 GLUCOSE BLOOD TEST: CPT

## 2024-04-15 PROCEDURE — 6360000002 HC RX W HCPCS: Performed by: PHYSICIAN ASSISTANT

## 2024-04-15 PROCEDURE — 7100000010 HC PHASE II RECOVERY - FIRST 15 MIN: Performed by: INTERNAL MEDICINE

## 2024-04-15 PROCEDURE — 3600007512: Performed by: INTERNAL MEDICINE

## 2024-04-15 PROCEDURE — 7100000000 HC PACU RECOVERY - FIRST 15 MIN: Performed by: INTERNAL MEDICINE

## 2024-04-15 PROCEDURE — 2580000003 HC RX 258: Performed by: INTERNAL MEDICINE

## 2024-04-15 PROCEDURE — 0W3P8ZZ CONTROL BLEEDING IN GASTROINTESTINAL TRACT, VIA NATURAL OR ARTIFICIAL OPENING ENDOSCOPIC: ICD-10-PCS | Performed by: INTERNAL MEDICINE

## 2024-04-15 PROCEDURE — 2500000003 HC RX 250 WO HCPCS: Performed by: NURSE ANESTHETIST, CERTIFIED REGISTERED

## 2024-04-15 PROCEDURE — C1889 IMPLANT/INSERT DEVICE, NOC: HCPCS | Performed by: INTERNAL MEDICINE

## 2024-04-15 PROCEDURE — 6360000002 HC RX W HCPCS: Performed by: NURSE ANESTHETIST, CERTIFIED REGISTERED

## 2024-04-15 PROCEDURE — A4216 STERILE WATER/SALINE, 10 ML: HCPCS | Performed by: PHYSICIAN ASSISTANT

## 2024-04-15 PROCEDURE — 99232 SBSQ HOSP IP/OBS MODERATE 35: CPT | Performed by: STUDENT IN AN ORGANIZED HEALTH CARE EDUCATION/TRAINING PROGRAM

## 2024-04-15 PROCEDURE — 6370000000 HC RX 637 (ALT 250 FOR IP): Performed by: INTERNAL MEDICINE

## 2024-04-15 PROCEDURE — 2140000001 HC CVICU INTERMEDIATE R&B

## 2024-04-15 PROCEDURE — 3700000001 HC ADD 15 MINUTES (ANESTHESIA): Performed by: INTERNAL MEDICINE

## 2024-04-15 PROCEDURE — 6370000000 HC RX 637 (ALT 250 FOR IP): Performed by: STUDENT IN AN ORGANIZED HEALTH CARE EDUCATION/TRAINING PROGRAM

## 2024-04-15 PROCEDURE — 3700000000 HC ANESTHESIA ATTENDED CARE: Performed by: INTERNAL MEDICINE

## 2024-04-15 DEVICE — CLIP INT L235CM WRK CHN DIA2.8MM OPN 11MM BRAID CATH ROT BX/10: Type: IMPLANTABLE DEVICE | Status: FUNCTIONAL

## 2024-04-15 RX ORDER — FENTANYL CITRATE 50 UG/ML
25 INJECTION, SOLUTION INTRAMUSCULAR; INTRAVENOUS EVERY 5 MIN PRN
Status: CANCELLED | OUTPATIENT
Start: 2024-04-15

## 2024-04-15 RX ORDER — DIPHENHYDRAMINE HYDROCHLORIDE 50 MG/ML
12.5 INJECTION INTRAMUSCULAR; INTRAVENOUS
Status: CANCELLED | OUTPATIENT
Start: 2024-04-15 | End: 2024-04-16

## 2024-04-15 RX ORDER — SODIUM CHLORIDE 9 MG/ML
INJECTION, SOLUTION INTRAVENOUS PRN
Status: CANCELLED | OUTPATIENT
Start: 2024-04-15

## 2024-04-15 RX ORDER — PROPOFOL 10 MG/ML
INJECTION, EMULSION INTRAVENOUS CONTINUOUS PRN
Status: DISCONTINUED | OUTPATIENT
Start: 2024-04-15 | End: 2024-04-15 | Stop reason: SDUPTHER

## 2024-04-15 RX ORDER — NALOXONE HYDROCHLORIDE 0.4 MG/ML
INJECTION, SOLUTION INTRAMUSCULAR; INTRAVENOUS; SUBCUTANEOUS PRN
Status: CANCELLED | OUTPATIENT
Start: 2024-04-15

## 2024-04-15 RX ORDER — SODIUM CHLORIDE 0.9 % (FLUSH) 0.9 %
5-40 SYRINGE (ML) INJECTION EVERY 12 HOURS SCHEDULED
Status: CANCELLED | OUTPATIENT
Start: 2024-04-15

## 2024-04-15 RX ORDER — HEPARIN SODIUM 1000 [USP'U]/ML
INJECTION, SOLUTION INTRAVENOUS; SUBCUTANEOUS
Status: DISCONTINUED
Start: 2024-04-15 | End: 2024-04-16

## 2024-04-15 RX ORDER — ONDANSETRON 2 MG/ML
4 INJECTION INTRAMUSCULAR; INTRAVENOUS
Status: CANCELLED | OUTPATIENT
Start: 2024-04-15 | End: 2024-04-16

## 2024-04-15 RX ORDER — LIDOCAINE HYDROCHLORIDE 20 MG/ML
INJECTION, SOLUTION EPIDURAL; INFILTRATION; INTRACAUDAL; PERINEURAL PRN
Status: DISCONTINUED | OUTPATIENT
Start: 2024-04-15 | End: 2024-04-15 | Stop reason: SDUPTHER

## 2024-04-15 RX ORDER — SODIUM CHLORIDE 0.9 % (FLUSH) 0.9 %
5-40 SYRINGE (ML) INJECTION PRN
Status: CANCELLED | OUTPATIENT
Start: 2024-04-15

## 2024-04-15 RX ADMIN — ISOSORBIDE DINITRATE 40 MG: 10 TABLET ORAL at 13:42

## 2024-04-15 RX ADMIN — HYDRALAZINE HYDROCHLORIDE 100 MG: 50 TABLET ORAL at 21:56

## 2024-04-15 RX ADMIN — POTASSIUM CHLORIDE 20 MEQ: 1500 TABLET, EXTENDED RELEASE ORAL at 08:49

## 2024-04-15 RX ADMIN — AMLODIPINE BESYLATE 10 MG: 10 TABLET ORAL at 11:12

## 2024-04-15 RX ADMIN — HYDRALAZINE HYDROCHLORIDE 100 MG: 50 TABLET ORAL at 13:42

## 2024-04-15 RX ADMIN — LIDOCAINE HYDROCHLORIDE 20 MG: 20 INJECTION, SOLUTION EPIDURAL; INFILTRATION; INTRACAUDAL; PERINEURAL at 14:32

## 2024-04-15 RX ADMIN — PANTOPRAZOLE SODIUM 40 MG: 40 INJECTION, POWDER, FOR SOLUTION INTRAVENOUS at 21:56

## 2024-04-15 RX ADMIN — HYDRALAZINE HYDROCHLORIDE 100 MG: 50 TABLET ORAL at 06:04

## 2024-04-15 RX ADMIN — POTASSIUM CHLORIDE 20 MEQ: 1500 TABLET, EXTENDED RELEASE ORAL at 21:55

## 2024-04-15 RX ADMIN — BUMETANIDE 1 MG: 0.25 INJECTION INTRAMUSCULAR; INTRAVENOUS at 08:49

## 2024-04-15 RX ADMIN — FINASTERIDE 5 MG: 5 TABLET, FILM COATED ORAL at 21:55

## 2024-04-15 RX ADMIN — AZITHROMYCIN DIHYDRATE 500 MG: 500 INJECTION, POWDER, LYOPHILIZED, FOR SOLUTION INTRAVENOUS at 12:30

## 2024-04-15 RX ADMIN — PANTOPRAZOLE SODIUM 40 MG: 40 INJECTION, POWDER, FOR SOLUTION INTRAVENOUS at 08:48

## 2024-04-15 RX ADMIN — PROPOFOL 100 MCG/KG/MIN: 10 INJECTION, EMULSION INTRAVENOUS at 14:32

## 2024-04-15 RX ADMIN — WATER 1000 MG: 1 INJECTION INTRAMUSCULAR; INTRAVENOUS; SUBCUTANEOUS at 12:30

## 2024-04-15 RX ADMIN — SERTRALINE HYDROCHLORIDE 50 MG: 50 TABLET ORAL at 08:49

## 2024-04-15 RX ADMIN — LOSARTAN POTASSIUM 25 MG: 25 TABLET, FILM COATED ORAL at 11:12

## 2024-04-15 RX ADMIN — ISOSORBIDE DINITRATE 40 MG: 10 TABLET ORAL at 21:56

## 2024-04-15 RX ADMIN — ATORVASTATIN CALCIUM 40 MG: 40 TABLET, FILM COATED ORAL at 21:55

## 2024-04-15 RX ADMIN — POTASSIUM CHLORIDE 20 MEQ: 1500 TABLET, EXTENDED RELEASE ORAL at 13:42

## 2024-04-15 RX ADMIN — BUMETANIDE 1 MG: 0.25 INJECTION INTRAMUSCULAR; INTRAVENOUS at 17:45

## 2024-04-15 ASSESSMENT — ENCOUNTER SYMPTOMS
SHORTNESS OF BREATH: 0
VOMITING: 0
NAUSEA: 0
COLOR CHANGE: 0
SORE THROAT: 0
PHOTOPHOBIA: 0
WHEEZING: 0
EYE PAIN: 0
DIARRHEA: 0
CONSTIPATION: 0
ABDOMINAL PAIN: 0
HEMATOCHEZIA: 0
COUGH: 0

## 2024-04-15 ASSESSMENT — PAIN SCALES - GENERAL
PAINLEVEL_OUTOF10: 0

## 2024-04-15 ASSESSMENT — PAIN - FUNCTIONAL ASSESSMENT: PAIN_FUNCTIONAL_ASSESSMENT: 0-10

## 2024-04-15 NOTE — CARE COORDINATION
Chart reviewed.  New HD  Pt not medically stable at this time.    Active with [facility].  CM will continue to follow along.            JOSÉ MIGUEL OrtizN RN  Care Management

## 2024-04-15 NOTE — PLAN OF CARE
Problem: Safety - Adult  Goal: Free from fall injury  4/15/2024 1853 by Elsie Rivera RN  Outcome: Progressing  4/15/2024 0830 by Eloisa Gale RN  Outcome: Progressing     Problem: Discharge Planning  Goal: Discharge to home or other facility with appropriate resources  4/15/2024 1853 by Elsie Rivera RN  Outcome: Progressing  4/15/2024 0830 by Eloisa Gale RN  Outcome: Progressing  Flowsheets (Taken 4/15/2024 0813 by Elsie Rivera RN)  Discharge to home or other facility with appropriate resources: Identify barriers to discharge with patient and caregiver     Problem: Skin/Tissue Integrity  Goal: Absence of new skin breakdown  Description: 1.  Monitor for areas of redness and/or skin breakdown  2.  Assess vascular access sites hourly  3.  Every 4-6 hours minimum:  Change oxygen saturation probe site  4.  Every 4-6 hours:  If on nasal continuous positive airway pressure, respiratory therapy assess nares and determine need for appliance change or resting period.  4/15/2024 0830 by lEoisa Gale RN  Outcome: Progressing     Problem: Pain  Goal: Verbalizes/displays adequate comfort level or baseline comfort level  Recent Flowsheet Documentation  Taken 4/15/2024 1600 by Elsie Rivera RN  Verbalizes/displays adequate comfort level or baseline comfort level: Encourage patient to monitor pain and request assistance  Taken 4/15/2024 1200 by Elsie Rivera RN  Verbalizes/displays adequate comfort level or baseline comfort level: Encourage patient to monitor pain and request assistance

## 2024-04-15 NOTE — PROGRESS NOTES
chloride (KLOR-CON M) extended release tablet 40 mEq  40 mEq Oral PRN    Or    potassium bicarb-citric acid (EFFER-K) effervescent tablet 40 mEq  40 mEq Oral PRN    Or    potassium chloride 10 mEq/100 mL IVPB (Peripheral Line)  10 mEq IntraVENous PRN    acetaminophen (TYLENOL) tablet 650 mg  650 mg Oral Q6H PRN    0.9 % sodium chloride infusion   IntraVENous PRN    amLODIPine (NORVASC) tablet 10 mg  10 mg Oral Daily    atorvastatin (LIPITOR) tablet 40 mg  40 mg Oral Daily    finasteride (PROSCAR) tablet 5 mg  5 mg Oral Daily    sertraline (ZOLOFT) tablet 50 mg  50 mg Oral Daily    glucose chewable tablet 16 g  4 tablet Oral PRN    dextrose bolus 10% 125 mL  125 mL IntraVENous PRN    Or    dextrose bolus 10% 250 mL  250 mL IntraVENous PRN    glucagon (rDNA) injection 1 mg  1 mg SubCUTAneous PRN    dextrose 10 % infusion   IntraVENous Continuous PRN    insulin lispro (HUMALOG) injection vial 0-4 Units  0-4 Units SubCUTAneous TID WC    insulin lispro (HUMALOG) injection vial 0-4 Units  0-4 Units SubCUTAneous Nightly       Review of Symptoms: comprehensive ROS negative except above.   Objective:   Patient Vitals for the past 24 hrs:   Temp Pulse Resp BP SpO2   04/15/24 1015 -- (!) 47 -- (!) 186/91 --   04/15/24 1000 -- (!) 49 -- (!) 175/78 --   04/15/24 0945 -- 52 -- (!) 167/70 --   04/15/24 0930 -- 53 -- (!) 160/84 --   04/15/24 0920 -- 52 -- (!) 172/68 --   04/15/24 0910 98 °F (36.7 °C) 54 18 (!) 163/73 98 %   04/15/24 0725 97.7 °F (36.5 °C) (!) 48 20 (!) 176/79 98 %   04/15/24 0415 97.9 °F (36.6 °C) 52 18 (!) 179/78 99 %   04/14/24 2345 98 °F (36.7 °C) 54 18 (!) 172/84 97 %   04/14/24 2000 98.5 °F (36.9 °C) 54 20 (!) 164/72 98 %   04/14/24 1715 98.1 °F (36.7 °C) (!) 48 18 (!) 177/75 94 %   04/14/24 1656 98.5 °F (36.9 °C) (!) 47 18 (!) 173/92 --   04/14/24 1637 -- (!) 48 -- (!) 172/87 --   04/14/24 1630 -- (!) 48 -- (!) 156/83 --   04/14/24 1615 -- 55 -- (!) 158/78 --   04/14/24 1600 -- 58 -- (!) 163/65 --   04/14/24

## 2024-04-15 NOTE — ANESTHESIA PRE PROCEDURE
disease)    • Hypertension    • Nephrotic syndrome    • Obesity, Class I, BMI 30-34.9    • Obstructive sleep apnea on CPAP    • Pure hypercholesterolemia    • Received intravenous tissue plasminogen activator (tPA) in emergency department 8/12/2019   • 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HCC)     ALECT2   • Secondary hyperparathyroidism of renal origin (HCC) 8/18/2019    PTH (8/18/2019) = 101.7   • Type 2 diabetes mellitus with stage 2 chronic kidney disease (HCC)     HbA1c (8/13/2019) = 6.6 no meds   • Vitamin B12 deficiency anemia 8/14/2019    Vitamin B12 (8/14/2019) = 208   • Vitamin D deficiency 8/19/2019    Vitamin D 25-Hydroxy (8/19/2019) = 16.2        Past Surgical History:        Procedure Laterality Date   • COLONOSCOPY N/A 4/15/2019    COLONOSCOPY performed by Héctor Piedra MD at [facility] ENDOSCOPY   • COLONOSCOPY N/A 12/20/2021    COLONOSCOPY with polypectomies performed by Héctor Piedra MD at [facility] ENDOSCOPY   • CT BIOPSY RENAL  11/2/2023    CT BIOPSY RENAL 11/2/2023 [facility] RAD CT   • TONSILLECTOMY         Social History:    Social History     Tobacco Use   • Smoking status: Former   • Smokeless tobacco: Never   Substance Use Topics   • Alcohol use: No     Alcohol/week: 0.0 standard drinks of alcohol                                Counseling given: Not Answered      Vital Signs (Current):   Vitals:    04/15/24 1215 04/15/24 1230 04/15/24 1240 04/15/24 1338   BP: (!) 177/84 (!) 174/94 (!) 150/61 (!) 172/72   Pulse: (!) 49 50 65 52   Resp:  18 10 20   Temp:  97.8 °F (36.6 °C) 98.9 °F (37.2 °C) 98.3 °F (36.8 °C)   TempSrc:    Oral   SpO2:   96% 97%   Weight:       Height:                                                  BP Readings from Last 3 Encounters:   04/15/24 (!) 172/72   04/04/24 (!) 150/60   04/02/24 (!) 155/82       NPO Status: Time of last liquid consumption: 1300                        Time of last solid consumption: 1800                        Date of last liquid consumption: 04/15/24                        Date of

## 2024-04-15 NOTE — PROGRESS NOTES
[email or web address]  [phone]    Gastroenterology follow up-Progress note    Impression:  1. Acute on chronic multifactorial iron deficiency anemia requiring blood transfusions s/p 3u PRBCs   2. Heme positive stool without overt GI bleeding/hematemesis  3. Epistaxis - resolved  4. Dyspnea, chronic with orthopnea  5. CHF with edema  6. A fib/sick sinus syndrome - not on anticoagulation other than 81 mg ASA  7. CKD   8. HTN  9. CVA (2019) with right-sided hemiparesis  10. Hypokalemia - repleted  11. Sleep apnea with use of CPAP  12. Bradycardia, asymptomatic    Plan:  1. EGD today 4/15/24 to evaluate upper GI tract. Allows for visualization, biopsies and intervention as indicated.  Patient was previously not a safe candidate for EGD with moderate sedation due to dyspnea/orthopnea which would have put him at significant cardiopulmonary complications with use of moderate sedation required for an EGD.    2. Continue acid suppression with PPI.  3. Remain NPO  4. H/H and medical management otherwise per primary team.          Chief Complaint: symptomatic anemia with SOB      Subjective:  no acute GI events over the weekend. Had dialysis. Breathing is better. Currently using his CPAP.     4/12/24 - 1u PRBCs ordered 4/11/24 but not yet transfused. Continues to have shortness of breath. No overt GI bleeding. Tolerating diet and restricted fluid.     4/11/24 - no overt GI bleeding and no GI symptoms to address. Continues to have shortness of breath.    4/10/24 - No abdominal pain, reflux, hematemesis, melena, or hematochezia.     Summary  - Ferritin 171, Iron 24, iron sat 9%  - Occult stool positive  - Hgb 5.9 upon admission w/ baseline in 7-9 range. FOB in March on last admission with same H/H. 3/17 NM scan neg. No overt GI bleeding, abd pain, or n/v.   - Followed by [facility] as OP w/ dx of ADELINE and anemia of chronic disease; received IV iron last 6/2023 but should be on PO iron now, also on q8wk darbepoeitin per 12/2023 [facility]  note.   - GI w/u of chronic ADELINE includes EGD-[place] in 2019 w/o significant findings. Pill cam study in 2021 revealed possible old blood in small bowel and possible ulcerations in colon prompting repeat colo in 2021 which revealed only TA polyps w/ 5yr recall       ROS: Denies any fevers, chills    General: awake, alert, edema noted   Neck: supple   Cardiovascular: bradycardia    Pulmonary/Chest Wall: Cpap in use; normal respiratory effort   Abdominal: obese, non-tender     Patient Active Problem List   Diagnosis    Secondary hyperparathyroidism of renal origin (HCC)    Impaired mobility and ADLs    Right hemiparesis (HCC)    Current use of aspirin    Obstructive sleep apnea on CPAP    Obesity, Class I, BMI 30-34.9    Dysarthria    CKD (chronic kidney disease) stage 2, GFR 60-89 ml/min    Hypertensive heart and kidney disease without heart failure and with stage 2 chronic kidney disease    Pure hypercholesterolemia    Hypokalemia    Elevated prostate specific antigen (PSA)    Refusal of statin medication by patient    First 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    Vitamin B12 deficiency anemia    Primary osteoarthritis of right ankle    Type 2 diabetes mellitus with hyperglycemia, without long-term current use of insulin (HCC)    Received intravenous tissue plasminogen activator (tPA) in emergency department    Proteinuria    Atrial fibrillation (HCC)    Acute renal failure superimposed on stage 3b chronic kidney disease (HCC)    Gout    Pericardial effusion    Acute on chronic diastolic heart failure (HCC)    History of CVA (cerebrovascular accident)    Nephrotic syndrome    Edema    Skin tear of forearm without complication, sequela    Benign essential HTN    Acute on chronic anemia

## 2024-04-15 NOTE — ANESTHESIA POSTPROCEDURE EVALUATION
Department of Anesthesiology  Postprocedure Note    Patient: Amish Shetty  MRN: [id number]  YOB: 1955  Date of evaluation: 4/15/2024    Procedure Summary       Date: 04/15/24 Room / Location: [facility] ENDO 02 / [facility] ENDOSCOPY    Anesthesia Start: 1425 Anesthesia Stop: 1440    Procedure: ESOPHAGOGASTRODUODENOSCOPY w/Cauterization of AVM w/Genii and Clip placement x2 (Upper GI Region) Diagnosis:       Anemia, unspecified type      (Anemia, unspecified type [D64.9])    Surgeons: Eddie Carrera MD Responsible Provider: Vini Dewitt Jr., MD    Anesthesia Type: MAC ASA Status: 3            Anesthesia Type: MAC    Benny Phase I: Benny Score: 10    Benny Phase II: Benny Score: 10    Anesthesia Post Evaluation    Patient location during evaluation: PACU  Patient participation: complete - patient participated  Level of consciousness: sleepy but conscious  Pain score: 0  Airway patency: patent  Nausea & Vomiting: no nausea and no vomiting  Cardiovascular status: blood pressure returned to baseline  Respiratory status: acceptable  Hydration status: euvolemic  Pain management: adequate    No notable events documented.

## 2024-04-15 NOTE — PROCEDURES
[phone]      [address] [address]     Esophagogastroduodenoscopy Procedure Note    Amish Shetty  1955  [id number]    Indication: Gastrointestinal hemorrhage, unspecified    Date of Procedure: 04/15/24    : Eddie Carrera MD    Assistant(s): Circulator: Darnell Zhang RN; Nikky Richmond RN; Mary Neri RN    Referring Provider:  Caleb Hurst MD    Anesthesia/Sedation:  MAC anesthesia Propofol      Procedure Details     After infomed consent was obtained for the procedure, with all risks and benefits of procedure explained the patient was taken to the endoscopy suite and placed in the left lateral decubitus position.  Following sequential administration of sedation as per above, the endoscope was inserted into the mouth and advanced under direct vision to third portion of the duodenum.  A careful inspection was made as the gastroscope was withdrawn, including a retroflexed view of the proximal stomach; findings and interventions are described below.      Findings:   Esophagus:small hiatal hernia  Stomach: Single AVM in body of stomach was fulgurated with heater probe - some resultant bleeding. Two endoclips applied.  Duodenum/jejunum: normal    Therapies:  endoclip was placed for hemorrhage and snare used as heater probe to ablate the gastric body AVM    Specimens: * No specimens in log *           Complications:   None; patient tolerated the procedure well.    Devices/implants/grafts/tissues/prosthesis: Two endoclips applied.    EBL:  None.           Impression:       AVM in stomach s/p fulguration and endoclip x 2 application.  Recommendations:  -Continue acid suppression., -Acid suppression with a proton pump inhibitor., -Clear liquid diet and advance as tolerated.    Eddie Carrera MD  4/15/2024  2:46 PM

## 2024-04-15 NOTE — PROGRESS NOTES
[facility] Hospitalist Group  Progress Note    Patient: Amish Shetty Age: 68 y.o. : 1955 MR#: [id number] SSN: [id number]  Date/Time: 4/15/2024    Subjective:   Subjective:  Symptoms:  Stable.  No shortness of breath, cough, chest pain, weakness, headache, chest pressure, diarrhea or anxiety.    Diet:  Adequate intake.  No nausea or vomiting.    Activity level: Normal.    Pain:  He reports no pain.       No acute events overnight, no new concerns or complaints, vitals stable.    Review of Systems   Constitutional: Negative for chills, fever and malaise/fatigue.   HENT:  Negative for sore throat.    Eyes:  Negative for pain, photophobia and visual disturbance.   Cardiovascular:  Negative for chest pain, irregular heartbeat, palpitations and syncope.   Respiratory:  Negative for cough, shortness of breath and wheezing.    Skin:  Negative for color change, dry skin and rash.   Gastrointestinal:  Negative for abdominal pain, constipation, diarrhea, hematochezia, melena, nausea and vomiting.   Neurological:  Negative for disturbances in coordination, dizziness, focal weakness, headaches and weakness.   Psychiatric/Behavioral:  Negative for altered mental status, depression, hallucinations and suicidal ideas.       Assessment/Plan:   Sepsis  Acute on chronic iron deficiency anemia  Hypokalemia  Anasarca  Acute HFpEF  Hypertension  Type 2 diabetes  CVA  A-fib  Depression  CKD now ESRD?  Pericardial effusion without tamponade    Plan  Patient receiving dialysis per nephrology, appreciate their assistance  GI consulted, plan for EGD today      Case discussed with:  [x]Patient  []Family  [x]Nursing  []Case Management  DVT Prophylaxis:  []Lovenox  []Hep SQ  [x]SCDs  []Coumadin   []On Heparin gtt   [] DOAC    Objective:   Objective:  General Appearance:  Comfortable, well-appearing, in no acute distress and not in pain.    Vital signs: (most recent): Blood pressure 120/67, pulse 51, temperature

## 2024-04-15 NOTE — PROGRESS NOTES
Attempted to see patient twice for PT evaluation.  First attempt patient was at dialysis, second attempt he was leaving the floor for a procedure.  Will continue to follow.  Mary Barroso, PT

## 2024-04-15 NOTE — DIALYSIS
HD Care plan  Time: 3 hrs  Dialysate:  3 K+   2.5 Ca++  Bath  Net UF: 2 L  Access: Aseptically care for RIJ CVC  Hemodynamic stability: Maintain BP WNL     Pre Dialysis:  Pt received from Unit Nurse Elsie Rivera , pt on a bed, A+O x 4, on oxygen 3L via NC SPO2 98%, no s/s of acute distress noted. RIJ CVC  assessed, no abnormalities noted,  TDC accessed per protocol without any difficulty, line patent with good flow.     Intra Dialysis:  Time out / safety process performed per policy, Tx initiated at 0920.    TDC flowing with ease. For hemodynamic stability UF goal set at 2000 ml as tolerated.  Pt offered assistance with repositioning every 2 hours/prn    Vascular access visible and line connections remained intact throughout entire duration of treatment.   Vital signs checked every 15 mins.WNL     Post Dialysis:  Tx completed at 1230,   Tolerated well , 2 L  removed.  De-accessed per protocol.    Dialysis catheter locked accordingly with Heparin 1.1 ml in arterial port, and 1.1 ml in venous port ,catheter dressing clean, dry and intact.  Post Dialysis report given to unit Nurse Elsie

## 2024-04-15 NOTE — PERIOP NOTE
TRANSFER - OUT REPORT:    Verbal report given to Elsie GONGORA on Amish Shetty  being transferred to  2303 for routine progression of patient care       Report consisted of patient's Situation, Background, Assessment and   Recommendations(SBAR).     Information from the following report(s) Nurse Handoff Report was reviewed with the receiving nurse.           Lines:   Peripheral IV 04/08/24 Left Antecubital (Active)   Site Assessment Clean, dry & intact 04/15/24 0400   Line Status Normal saline locked;Capped 04/15/24 0400   Line Care Connections checked and tightened 04/15/24 0400   Phlebitis Assessment No symptoms 04/15/24 0400   Infiltration Assessment 0 04/15/24 0400   Alcohol Cap Used Yes 04/15/24 0400   Dressing Status Clean, dry & intact 04/15/24 0400   Dressing Type Transparent 04/15/24 0400       Peripheral IV 04/11/24 Left Hand (Active)   Site Assessment Clean, dry & intact 04/15/24 0400   Line Status Normal saline locked;Capped 04/15/24 0400   Line Care Connections checked and tightened 04/15/24 0400   Phlebitis Assessment No symptoms 04/15/24 0400   Infiltration Assessment 0 04/15/24 0400   Alcohol Cap Used Yes 04/15/24 0400   Dressing Status Clean, dry & intact 04/15/24 0400   Dressing Type Transparent 04/15/24 0400   Dressing Intervention New 04/11/24 1152       Hemodialysis Central Access Right Neck (Active)   $Hemodialysis Central Access $Yes 04/12/24 1215   Continued need for line? Yes 04/15/24 1230   Site Assessment Clean, dry & intact 04/15/24 1230   CVC Lumen Status Alcohol cap present 04/15/24 1230   Venous Lumen Status Flushed;Heparin locked 04/15/24 1230   Arterial Lumen Status Flushed;Heparin locked 04/15/24 1230   Alcohol Cap Used No 04/14/24 1656   Line Care Ports disinfected;Chlorhexidine wipes;Connections checked and tightened 04/15/24 0920   Dressing Type Antimicrobial;Sterile dressing, transparent 04/15/24 0920   Date of Last Dressing Change 04/13/24 04/15/24 0920   Dressing Status Clean,  dry & intact 04/15/24 0920   Dressing Intervention New 04/14/24 1656   Dressing Change Due 04/20/24 04/15/24 0920   Verification by x-ray No 04/14/24 1656        Opportunity for questions and clarification was provided.      Patient transported with:  O2 @ 3lpm

## 2024-04-16 LAB
ANION GAP SERPL CALC-SCNC: 3 MMOL/L (ref 3–18)
BUN SERPL-MCNC: 18 MG/DL (ref 7–18)
BUN/CREAT SERPL: 9 (ref 12–20)
CALCIUM SERPL-MCNC: 7.7 MG/DL (ref 8.5–10.1)
CHLORIDE SERPL-SCNC: 106 MMOL/L (ref 100–111)
CO2 SERPL-SCNC: 30 MMOL/L (ref 21–32)
CREAT SERPL-MCNC: 1.96 MG/DL (ref 0.6–1.3)
GLUCOSE BLD STRIP.AUTO-MCNC: 109 MG/DL (ref 70–110)
GLUCOSE BLD STRIP.AUTO-MCNC: 92 MG/DL (ref 70–110)
GLUCOSE BLD STRIP.AUTO-MCNC: 98 MG/DL (ref 70–110)
GLUCOSE BLD STRIP.AUTO-MCNC: 99 MG/DL (ref 70–110)
GLUCOSE SERPL-MCNC: 83 MG/DL (ref 74–99)
MAGNESIUM SERPL-MCNC: 1.8 MG/DL (ref 1.6–2.6)
POTASSIUM SERPL-SCNC: 4.2 MMOL/L (ref 3.5–5.5)
SODIUM SERPL-SCNC: 139 MMOL/L (ref 136–145)

## 2024-04-16 PROCEDURE — 6360000002 HC RX W HCPCS: Performed by: INTERNAL MEDICINE

## 2024-04-16 PROCEDURE — 97535 SELF CARE MNGMENT TRAINING: CPT

## 2024-04-16 PROCEDURE — 6370000000 HC RX 637 (ALT 250 FOR IP): Performed by: PHYSICIAN ASSISTANT

## 2024-04-16 PROCEDURE — 2140000001 HC CVICU INTERMEDIATE R&B

## 2024-04-16 PROCEDURE — 97530 THERAPEUTIC ACTIVITIES: CPT

## 2024-04-16 PROCEDURE — 02PYX3Z REMOVAL OF INFUSION DEVICE FROM GREAT VESSEL, EXTERNAL APPROACH: ICD-10-PCS | Performed by: SURGERY

## 2024-04-16 PROCEDURE — 6370000000 HC RX 637 (ALT 250 FOR IP): Performed by: INTERNAL MEDICINE

## 2024-04-16 PROCEDURE — 97116 GAIT TRAINING THERAPY: CPT

## 2024-04-16 PROCEDURE — 99232 SBSQ HOSP IP/OBS MODERATE 35: CPT | Performed by: STUDENT IN AN ORGANIZED HEALTH CARE EDUCATION/TRAINING PROGRAM

## 2024-04-16 PROCEDURE — 99232 SBSQ HOSP IP/OBS MODERATE 35: CPT | Performed by: SURGERY

## 2024-04-16 PROCEDURE — 94761 N-INVAS EAR/PLS OXIMETRY MLT: CPT

## 2024-04-16 PROCEDURE — 80048 BASIC METABOLIC PNL TOTAL CA: CPT

## 2024-04-16 PROCEDURE — 97162 PT EVAL MOD COMPLEX 30 MIN: CPT

## 2024-04-16 PROCEDURE — 2580000003 HC RX 258: Performed by: INTERNAL MEDICINE

## 2024-04-16 PROCEDURE — 83735 ASSAY OF MAGNESIUM: CPT

## 2024-04-16 PROCEDURE — 6360000002 HC RX W HCPCS: Performed by: PHYSICIAN ASSISTANT

## 2024-04-16 PROCEDURE — C9113 INJ PANTOPRAZOLE SODIUM, VIA: HCPCS | Performed by: PHYSICIAN ASSISTANT

## 2024-04-16 PROCEDURE — 6370000000 HC RX 637 (ALT 250 FOR IP): Performed by: STUDENT IN AN ORGANIZED HEALTH CARE EDUCATION/TRAINING PROGRAM

## 2024-04-16 PROCEDURE — 2580000003 HC RX 258: Performed by: PHYSICIAN ASSISTANT

## 2024-04-16 PROCEDURE — A4216 STERILE WATER/SALINE, 10 ML: HCPCS | Performed by: PHYSICIAN ASSISTANT

## 2024-04-16 PROCEDURE — 82962 GLUCOSE BLOOD TEST: CPT

## 2024-04-16 RX ADMIN — WATER 1000 MG: 1 INJECTION INTRAMUSCULAR; INTRAVENOUS; SUBCUTANEOUS at 11:22

## 2024-04-16 RX ADMIN — AZITHROMYCIN DIHYDRATE 500 MG: 500 INJECTION, POWDER, LYOPHILIZED, FOR SOLUTION INTRAVENOUS at 11:34

## 2024-04-16 RX ADMIN — HYDRALAZINE HYDROCHLORIDE 100 MG: 50 TABLET ORAL at 14:35

## 2024-04-16 RX ADMIN — PANTOPRAZOLE SODIUM 40 MG: 40 INJECTION, POWDER, FOR SOLUTION INTRAVENOUS at 20:58

## 2024-04-16 RX ADMIN — SERTRALINE HYDROCHLORIDE 50 MG: 50 TABLET ORAL at 09:10

## 2024-04-16 RX ADMIN — ISOSORBIDE DINITRATE 40 MG: 10 TABLET ORAL at 09:10

## 2024-04-16 RX ADMIN — ATORVASTATIN CALCIUM 40 MG: 40 TABLET, FILM COATED ORAL at 20:59

## 2024-04-16 RX ADMIN — ISOSORBIDE DINITRATE 40 MG: 10 TABLET ORAL at 20:59

## 2024-04-16 RX ADMIN — POTASSIUM CHLORIDE 20 MEQ: 1500 TABLET, EXTENDED RELEASE ORAL at 20:59

## 2024-04-16 RX ADMIN — HYDRALAZINE HYDROCHLORIDE 100 MG: 50 TABLET ORAL at 06:43

## 2024-04-16 RX ADMIN — PANTOPRAZOLE SODIUM 40 MG: 40 INJECTION, POWDER, FOR SOLUTION INTRAVENOUS at 09:13

## 2024-04-16 RX ADMIN — AMLODIPINE BESYLATE 10 MG: 10 TABLET ORAL at 09:09

## 2024-04-16 RX ADMIN — POTASSIUM CHLORIDE 20 MEQ: 1500 TABLET, EXTENDED RELEASE ORAL at 14:35

## 2024-04-16 RX ADMIN — POTASSIUM CHLORIDE 20 MEQ: 1500 TABLET, EXTENDED RELEASE ORAL at 09:10

## 2024-04-16 RX ADMIN — BUMETANIDE 1 MG: 0.25 INJECTION INTRAMUSCULAR; INTRAVENOUS at 18:10

## 2024-04-16 RX ADMIN — LOSARTAN POTASSIUM 25 MG: 25 TABLET, FILM COATED ORAL at 09:10

## 2024-04-16 RX ADMIN — ISOSORBIDE DINITRATE 40 MG: 10 TABLET ORAL at 14:35

## 2024-04-16 RX ADMIN — FINASTERIDE 5 MG: 5 TABLET, FILM COATED ORAL at 20:59

## 2024-04-16 RX ADMIN — BUMETANIDE 1 MG: 0.25 INJECTION INTRAMUSCULAR; INTRAVENOUS at 09:13

## 2024-04-16 RX ADMIN — HYDRALAZINE HYDROCHLORIDE 100 MG: 50 TABLET ORAL at 21:00

## 2024-04-16 ASSESSMENT — ENCOUNTER SYMPTOMS
NAUSEA: 0
DIARRHEA: 0
COLOR CHANGE: 0
HEMATOCHEZIA: 0
CONSTIPATION: 0
PHOTOPHOBIA: 0
ABDOMINAL PAIN: 0
EYE PAIN: 0
WHEEZING: 0
COUGH: 0
SHORTNESS OF BREATH: 0
VOMITING: 0
SORE THROAT: 0

## 2024-04-16 ASSESSMENT — PAIN SCALES - GENERAL
PAINLEVEL_OUTOF10: 0

## 2024-04-16 NOTE — PROGRESS NOTES
RENAL DAILY PROGRESS NOTE  68-year-old male with past medical history of diabetes, amyloidosis, anemia, CKD admitted for severe anemia, following for renal failure   Subjective:       Complaint:   Overnight events noted  EGD shows AVM, addressed appropriately   Urine output about 1500cc    IMPRESSION:   Chronic kidney disease with proteinuria, progressive CKD, status post kidney biopsy which shows ALECT2 amyloidosis, history of diabetes baseline creatinine around 2.8 to 3 mg per DL, progressive decline in kidney function over the last several month   Proteinuria, heavy proteinuria, getting worse  Hypertension, history of uncontrolled hypertension  Anemia, severe anemia on this admission, follows with oncology clinic outpatient has been receiving Epogen and iron infusion, postive FOBT on this admission  HFpEF, fluid overload on this admission, pericardial effusion without tamponade,  afib, not on anticoagulation  Diabetes  History of stroke   PLAN:    He appears well compensated from volume stand point, has good urine output with diuretics support. Plan to stop dialysis, remove temp dialysis catheter.   Continue volume management with bumex  Recommend him close follow up in outpatient.   Okay to discharge from renal stand point, will follow outpatient closely.          Current Facility-Administered Medications   Medication Dose Route Frequency    isosorbide dinitrate (ISORDIL) tablet 40 mg  40 mg Oral TID    losartan (COZAAR) tablet 25 mg  25 mg Oral Daily    bumetanide (BUMEX) injection 1 mg  1 mg IntraVENous BID    cefTRIAXone (ROCEPHIN) 1,000 mg in sterile water 10 mL IV syringe  1,000 mg IntraVENous Q24H    azithromycin (ZITHROMAX) 500 mg in sodium chloride 0.9% 250 mL (vial-mate/adapter) IVPB  500 mg IntraVENous Q24H    0.9 % sodium chloride infusion   IntraVENous PRN    pantoprazole (PROTONIX) 40 mg in sodium chloride (PF) 0.9 % 10 mL injection  40 mg IntraVENous Q12H    hydrALAZINE (APRESOLINE) tablet  100 mg  100 mg Oral 3 times per day    potassium chloride (KLOR-CON M) extended release tablet 20 mEq  20 mEq Oral TID    potassium chloride (KLOR-CON M) extended release tablet 40 mEq  40 mEq Oral PRN    Or    potassium bicarb-citric acid (EFFER-K) effervescent tablet 40 mEq  40 mEq Oral PRN    Or    potassium chloride 10 mEq/100 mL IVPB (Peripheral Line)  10 mEq IntraVENous PRN    acetaminophen (TYLENOL) tablet 650 mg  650 mg Oral Q6H PRN    0.9 % sodium chloride infusion   IntraVENous PRN    amLODIPine (NORVASC) tablet 10 mg  10 mg Oral Daily    atorvastatin (LIPITOR) tablet 40 mg  40 mg Oral Daily    finasteride (PROSCAR) tablet 5 mg  5 mg Oral Daily    sertraline (ZOLOFT) tablet 50 mg  50 mg Oral Daily    glucose chewable tablet 16 g  4 tablet Oral PRN    dextrose bolus 10% 125 mL  125 mL IntraVENous PRN    Or    dextrose bolus 10% 250 mL  250 mL IntraVENous PRN    glucagon (rDNA) injection 1 mg  1 mg SubCUTAneous PRN    dextrose 10 % infusion   IntraVENous Continuous PRN    insulin lispro (HUMALOG) injection vial 0-4 Units  0-4 Units SubCUTAneous TID WC    insulin lispro (HUMALOG) injection vial 0-4 Units  0-4 Units SubCUTAneous Nightly       Review of Symptoms: comprehensive ROS negative except above.   Objective:   Patient Vitals for the past 24 hrs:   Temp Pulse Resp BP SpO2   04/16/24 0728 98.1 °F (36.7 °C) 59 16 (!) 152/72 98 %   04/16/24 0643 -- -- -- (!) 169/72 --   04/16/24 0400 97.5 °F (36.4 °C) (!) 45 16 (!) 151/59 97 %   04/15/24 2345 97 °F (36.1 °C) 50 18 135/77 95 %   04/15/24 2156 -- -- -- (!) 138/58 --   04/15/24 1956 98.6 °F (37 °C) 53 19 (!) 143/62 96 %   04/15/24 1637 98.6 °F (37 °C) 59 20 139/63 96 %   04/15/24 1600 -- -- -- -- 95 %   04/15/24 1454 -- 51 24 120/67 95 %   04/15/24 1450 -- 51 18 (!) 112/58 95 %   04/15/24 1445 98.6 °F (37 °C) 56 20 116/63 93 %   04/15/24 1439 97.9 °F (36.6 °C) 68 20 (!) 108/55 96 %   04/15/24 1338 98.3 °F (36.8 °C) 52 20 (!) 172/72 97 %   04/15/24 1240 98.9 °F

## 2024-04-16 NOTE — PROCEDURES
Vascular Surgery    Procedure Note    Procedure: Tunneled Dialysis Catheter Removal    Indications: 67 y/o M s/p temp dialysis catheter placement, and it is no longer needed at this time.  Catheter removal was requested by his nephrologist.     Procedure: The patient was positioned in the supine position in his bed. The catheter sutures were removed and the catheter was withdrawn, completely intact.  Pressure was held for several minutes. The patient was positioned in the seated position.  Hemostasis was achieved. A DSD was applied. The patient tolerated the procedure well.      Imani Craig MD  Vascular Surgeon

## 2024-04-16 NOTE — PROGRESS NOTES
Comprehensive Nutrition Assessment    Type and Reason for Visit:  Initial, RD Nutrition Re-Screen/LOS    Nutrition Recommendations/Plan:   Continue current diet.   Monitor PO intake/tolerance of meals, weight, labs, and POC while admitted.      Malnutrition Assessment:  Malnutrition Status:  Insufficient data (04/16/24 1341)    Context:  Acute Illness       Nutrition History and Allergies:   PMHx: iron deficiency anemia, HTN, CKD, h/o CVA, T2DM. Wt hx: c wt- 225 lb (4/14/24), 223 lb (2/12/24), 216 lb (11/03/23, +4.2%), 215 lb (9/05/23), 223.4 lb (12/8/22), no significant wt change documented x >1 year PTA.      Nutrition Assessment:    Pt admitted for management of acute on chronic iron deficiency anemia, acute on chronic HF, GI hemorrhage with melena. Per flow sheets, meal intake %, with 14/15 entries >75%. Attempted to visit pt- pt unavailable x2. Will continue current diet and monitor PO intake/tolerance during admission.     Nutrition Related Findings:    Last BM: 4/15. Edema: +2 generalized/BUE/BLE. Labs: GFR 36 L, Ca 7.7 L. POC glucose  x 24-hrs. Pertinent meds: bumex, protonix, KCl 20 mEq TID. Wound Type: Skin Tears       Current Nutrition Intake & Therapies:    Average Meal Intake: %  Average Supplements Intake: None Ordered  ADULT DIET; Regular; 3 carb choices (45 gm/meal); 1500 ml    Anthropometric Measures:  Height: 175.3 cm (5' 9\")  Ideal Body Weight (IBW): 160 lbs (73 kg)       Current Body Weight: 102.1 kg (225 lb), 140.6 % IBW. Weight Source: Bed Scale  Current BMI (kg/m2): 33.2  Usual Body Weight: 101.2 kg (223 lb) (2/12/24)  % Weight Change (Calculated): 0.9  Weight Adjustment For: No Adjustment  BMI Categories: Obese Class 1 (BMI 30.0-34.9)    Estimated Daily Nutrient Needs:  Energy Requirements Based On: Formula  Weight Used for Energy Requirements: Current  Energy (kcal/day): 2299-2405 (MSJ x 1-1.1)  Weight Used for Protein Requirements: Ideal  Protein (g/day): 110-146

## 2024-04-16 NOTE — PLAN OF CARE
Problem: Safety - Adult  Goal: Free from fall injury  4/16/2024 1259 by Bari Rosenberg RN  Outcome: Progressing  4/16/2024 0017 by Princess Evelyn Webster RN  Outcome: Progressing     Problem: Discharge Planning  Goal: Discharge to home or other facility with appropriate resources  4/16/2024 1259 by Bari Rosenberg RN  Outcome: Progressing  4/16/2024 0017 by Princess Evelyn Webster RN  Outcome: Progressing  Flowsheets (Taken 4/15/2024 1956)  Discharge to home or other facility with appropriate resources:   Identify barriers to discharge with patient and caregiver   Arrange for needed discharge resources and transportation as appropriate     Problem: Skin/Tissue Integrity  Goal: Absence of new skin breakdown  Description: 1.  Monitor for areas of redness and/or skin breakdown  2.  Assess vascular access sites hourly  3.  Every 4-6 hours minimum:  Change oxygen saturation probe site  4.  Every 4-6 hours:  If on nasal continuous positive airway pressure, respiratory therapy assess nares and determine need for appliance change or resting period.  4/16/2024 1259 by Bari Rosenberg RN  Outcome: Progressing  4/16/2024 0017 by Princess Evelyn Webster RN  Outcome: Progressing     Problem: ABCDS Injury Assessment  Goal: Absence of physical injury  4/16/2024 1259 by Bari Rosenberg RN  Outcome: Progressing  4/16/2024 0017 by Princess Evelyn Webster RN  Outcome: Progressing  Flowsheets (Taken 4/15/2024 1956)  Absence of Physical Injury: Implement safety measures based on patient assessment     Problem: Pain  Goal: Verbalizes/displays adequate comfort level or baseline comfort level  4/16/2024 1259 by Bari Rosenberg RN  Outcome: Progressing  Flowsheets (Taken 4/16/2024 0400 by Princess Evelyn Webster RN)  Verbalizes/displays adequate comfort level or baseline comfort level:   Encourage patient to monitor pain and request assistance   Assess pain using appropriate pain scale   Implement non-pharmacological measures as appropriate

## 2024-04-16 NOTE — PROGRESS NOTES
Vascular Surgery Progress Note      No events overnight.  Pt denies dyspnea. States he feels well and is looking forward to having the catheter removed. I spoke to Dr JOSE C Ramírez directly and he requests removal of the temp HD catheter      PE:   Blood pressure (!) 145/63, pulse 54, temperature 97.1 °F (36.2 °C), temperature source Oral, resp. rate 18, height 1.753 m (5' 9\"), weight 102.1 kg (225 lb 1.6 oz), SpO2 98 %.      Intake/Output Summary (Last 24 hours) at 4/16/2024 1201  Last data filed at 4/16/2024 1136  Gross per 24 hour   Intake 1669 ml   Output 4400 ml   Net -2731 ml       NAD  Breathing comfortably  Sitting in chair  A&O x 3  R IJ temp HD catheter in place      Lab Results   Component Value Date    WBC 12.1 04/15/2024    HGB 7.8 (L) 04/15/2024    HCT 25.5 (L) 04/15/2024    MCV 87.3 04/15/2024     04/15/2024     Lab Results   Component Value Date/Time     04/16/2024 01:15 AM    K 4.2 04/16/2024 01:15 AM     04/16/2024 01:15 AM    CO2 30 04/16/2024 01:15 AM    BUN 18 04/16/2024 01:15 AM    CREATININE 1.96 04/16/2024 01:15 AM    GLUCOSE 83 04/16/2024 01:15 AM    CALCIUM 7.7 04/16/2024 01:15 AM    LABGLOM 36 04/16/2024 01:15 AM            IMPRESSION:   S/p HD via a RIJ temp catheter, no longer needs HD at this time per his nephrologist.       PLAN:   Removal of RIJ temp HD catheter  Pt will need to by lying flat in bed  Risks, benefits and alternatives were discussed with the patient including but not limited to bleeding, infection, injury to surrounding structures including nerves and/or other organs, scar tissue accumulation, need for surveillance, need for further procedures, air embolism, need for replacement of catheter if dialysis were needed again in the future.  The patient expressed understanding after the opportunity to ask questions, and consented to the procedure.       Imani Craig MD  Vascular Surgeon  [facility]

## 2024-04-16 NOTE — PROGRESS NOTES
Value Ref Range    POC Glucose 92 70 - 110 mg/dL          Signed By: Elias Espinoza MD     April 16, 2024 9:24 AM

## 2024-04-16 NOTE — PROGRESS NOTES
0710. Bedside and Verbal shift change report given to Mele RN (oncoming nurse) by  RN (offgoing nurse). Report included the following information Nurse Handoff Report, Adult Overview, Intake/Output, MAR, Recent Results, Cardiac Rhythm Slow afib-40-50's, and Neuro Assessment.     0909- Pt in bed alert and oriented times 4 with bed locked and low and call bell in reach. Needs addressed, denies pain. Pt medicated per MAR      1232- Dr. Craig at the bedside to remove pts temp dialysis cath. Pt tolerated well.     1240-Pt in bed alert and oriented times 4 with bed locked and low and call bell in reach. Needs addressed, denies pain. Pt up to bedside chair.    1435- Pt medicated per MAR.     1605- Pt in chair alert and oriented times 4 with bed locked and low and call bell in reach. Needs addressed, denies pain.     1810- Pt medicated per MAR.       1930-Bedside and Verbal shift change report given to  RN (oncoming nurse) by Mele RN (offgoing nurse). Report included the following information Nurse Handoff Report, Adult Overview, Intake/Output, MAR, Recent Results, Cardiac Rhythm Slow afib, and Neuro Assessment.

## 2024-04-16 NOTE — PROGRESS NOTES
[email or web address]  [phone]    Gastroenterology follow up-Progress note    Impression:  1. Acute on chronic multifactorial iron deficiency anemia requiring blood transfusions s/p 3u PRBCs   2. Heme positive stool without overt GI bleeding/hematemesis  3. Epistaxis - resolved  4. Dyspnea, chronic with orthopnea - improved with dialysis  5. CHF with edema  6. A fib/sick sinus syndrome - not on anticoagulation other than 81 mg ASA  7. CKD   8. HTN  9. CVA (2019) with right-sided hemiparesis  10. Hypokalemia - repleted  11. Sleep apnea with use of CPAP  12. Bradycardia, asymptomatic  13. 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 Continue acid suppression with PPI BID x 14 days then once a day lifelong.  2. Diet as tolerated  3. Timing to resume ASA per cardiology/primary team.  4. No NSAIDs  5. H/H and medical management otherwise per primary team.  6. Close outpatient hematology follow-up with Criselda Godwin PA-C with [facility] as scheduled. Will follow outpatient in office.    Will sign off but available as needed.  Thank you for this consultation and the opportunity to participate in the care of this patient. Please do not hesitate to call with any questions or concerns, or should event occur that may necessitate additional GI evaluation.           Chief Complaint: symptomatic anemia with SOB      Subjective:  had EGD with intervention yesterday (full report at end of note). No acute GI events/bleeding overnight. Had dialysis yesterday and feeling much better since on dialysis with improved shortness of breath and swelling.     4/15/24 - no acute GI events over the weekend. Had dialysis. Breathing is better. Currently using his CPAP.     4/12/24 - 1u PRBCs ordered 4/11/24 but not yet transfused. Continues to have shortness of breath. No overt GI bleeding. Tolerating diet and restricted fluid.     4/11/24 - no overt GI bleeding and no GI symptoms to  - 99 mg/dL    BUN 18 7.0 - 18 MG/DL    Creatinine 1.96 (H) 0.6 - 1.3 MG/DL    Bun/Cre Ratio 9 (L) 12 - 20      Est, Glom Filt Rate 36 (L) >60 ml/min/1.73m2    Calcium 7.7 (L) 8.5 - 10.1 MG/DL   POCT Glucose    Collection Time: 04/16/24  7:34 AM   Result Value Ref Range    POC Glucose 92 70 - 110 mg/dL           Esophagogastroduodenoscopy Procedure Note     Amish Shetty  1955  [id number]     Indication: Gastrointestinal hemorrhage, unspecified     Date of Procedure: 04/15/24     : Eddie Carrera MD     Assistant(s): Circulator: Darnell Zhang RN; Nikky Richmond RN; Mary Neri RN     Referring Provider:  Caleb Hurst MD     Anesthesia/Sedation:  MAC anesthesia Propofol      Procedure Details      After infomed consent was obtained for the procedure, with all risks and benefits of procedure explained the patient was taken to the endoscopy suite and placed in the left lateral decubitus position.  Following sequential administration of sedation as per above, the endoscope was inserted into the mouth and advanced under direct vision to third portion of the duodenum.  A careful inspection was made as the gastroscope was withdrawn, including a retroflexed view of the proximal stomach; findings and interventions are described below.       Findings:   Esophagus:small hiatal hernia  Stomach: Single AVM in body of stomach was fulgurated with heater probe - some resultant bleeding. Two endoclips applied.  Duodenum/jejunum: normal     Therapies:  endoclip was placed for hemorrhage and snare used as heater probe to ablate the gastric body AVM     Specimens: * No specimens in log *           Complications:   None; patient tolerated the procedure well.     Devices/implants/grafts/tissues/prosthesis: Two endoclips applied.     EBL:  None.           Impression:       AVM in stomach s/p fulguration and endoclip x 2 application.  Recommendations:  -Continue acid suppression., -Acid suppression with a proton pump

## 2024-04-16 NOTE — PLAN OF CARE
Problem: Occupational Therapy - Adult  Goal: By Discharge: Performs self-care activities at highest level of function for planned discharge setting.  See evaluation for individualized goals.  Description: Occupational Therapy Goals:  Initiated 4/11/2024 to be met within 7-10 days.    1.  Patient will perform upper body dressing with minimal assistance/contact guard assist.   2.  Patient will perform lower body dressing with moderate assistance .  3.  Patient will perform functional task in standing for 3 minutes with min assist for balance.  4.  Patient will perform toilet transfers with minimal assistance/contact guard assist.  5.  Patient will perform all aspects of toileting with moderate assistance .   6.  Patient will utilize energy conservation techniques during functional activities with verbal cues.    PLOF: Patient required assist with all basic self care tasks and used a quad cane for functional mobility PTA.      Outcome: Progressing   OCCUPATIONAL THERAPY TREATMENT    Patient: Amish Shetty (68 y.o. male)  Date: 4/16/2024  Diagnosis: Acute decompensated heart failure (HCC) [I50.9] <principal problem not specified>  Procedure(s) (LRB):  ESOPHAGOGASTRODUODENOSCOPY w/Cauterization of AVM w/Genii and Clip placement x2 (N/A) 1 Day Post-Op  Precautions: Fall Risk,  ,  ,  ,  ,  ,  ,      Chart, occupational therapy assessment, plan of care, and goals were reviewed.  ASSESSMENT:  Pt presented supine in bed upon entry and agreeable work with OT. Pt was able to transition to EOB SBA in prep for functional tasks. Once sitting, he required MOD A adjusting his socks 2/2 B UE edema. STS transfer MIN A with quad cane. He maneuvered in room ~ 20 ft x 2 CGA/MIN A with quad cane, simulating BR mobility. Pt visually fatigued after functional mobility, SPO2 assessed in 90s throughout session. Pt agreeable to sit up in reclining chair at end of session. He was left with BLE's elevated and all needs within reach. RN made  aware.  Progression toward goals:  []          Improving appropriately and progressing toward goals  [x]          Improving slowly and progressing toward goals  []          Not making progress toward goals and plan of care will be adjusted     PLAN:  Patient continues to benefit from skilled intervention to address the above impairments.  Continue treatment per established plan of care.    Further Equipment Recommendations for Discharge: [facility] [place] [facility]    AMPAC: AM-PAC Inpatient Daily Activity Raw Score: 14    Current research shows that an AM-PAC score of 17 or less is not associated with a discharge to the patient's home setting. Based on an AM-PAC score and their current ADL deficits; it is recommended that the patient have 3-5 sessions per week of Occupational Therapy at d/c to increase the patient's independence.      This AMPAC score should be considered in conjunction with interdisciplinary team recommendations to determine the most appropriate discharge setting. Patient's social support, diagnosis, medical stability, and prior level of function should also be taken into consideration.     SUBJECTIVE:   Patient stated, \"I want to sit up.\"    OBJECTIVE DATA SUMMARY:   Cognitive/Behavioral Status:  Orientation  Overall Orientation Status: Within Normal Limits  Orientation Level: Oriented X4  Cognition  Overall Cognitive Status: WNL    Functional Mobility and Transfers for ADLs:   Bed Mobility:  Bed Mobility Training  Bed Mobility Training: Yes  Rolling: Stand-by assistance  Supine to Sit: Stand-by assistance  Scooting: Stand-by assistance   Transfers:  Transfer Training  Transfer Training: Yes  Interventions: Verbal cues  Sit to Stand: Minimum assistance;Contact-guard assistance  Stand to Sit: Contact-guard assistance  Stand Pivot Transfers: Minimum assistance    Balance:  Balance  Sitting: Intact  Standing: Impaired  Standing - Static: Fair  Standing - Dynamic: Fair    ADL Intervention:        LE Dressing: Moderate

## 2024-04-16 NOTE — PLAN OF CARE
Pre-treatment: 0/10   Intensity Post-treatment: 0/10      Activity Tolerance:   Activity Tolerance: Patient tolerated evaluation without incident  Please refer to the flowsheet for vital signs taken during this treatment.    After treatment:   [x]         Patient left in no apparent distress sitting up in chair  []         Patient left in no apparent distress in bed  [x]         Call bell left within reach  [x]         Nursing notified  []         Caregiver present  []         Bed alarm activated  [x]         SCDs applied    COMMUNICATION/EDUCATION:   Patient Education  Education Given To: Patient  Education Provided: Role of Therapy;Plan of Care;Energy Conservation;Transfer Training  Education Method: Verbal;Teach Back  Barriers to Learning: None  Education Outcome: Verbalized understanding;Demonstrated understanding    Thank you for this referral.  Hansa Nguyen, PT  Minutes: 31      Eval Complexity: Decision Making: Medium Complexity

## 2024-04-16 NOTE — PROGRESS NOTES
194: Bedside shift change report given to FRANSISCA Monk/FRANSISCA Julio (oncoming nurse) by FRANSISCA Zimmerman (offgoing nurse). Report included the following information SBAR, Kardex, Intake/Output, MAR, Recent Results, and Cardiac Rhythm Sinus Charles .     Wound Prevention Checklist    Patient: Amish Shetty (68 y.o. male)  Date: 4/15/2024  Diagnosis: Acute decompensated heart failure (HCC) [I50.9] <principal problem not specified>    Precautions:         []  Heel prevention boots placed on patient    []  Patient turned q2h during shift    []  Lift team ordered    []  Patient on [place] bed/Specialty bed    [x]  Each Wound is documented during shift (Stage, Color, drainage, odor, measurements, and dressings)    [x]  Dual skin check done with FRANSISCA Zimmerman RN     : Shift assessment done. V/S obtained. Pt resting quietly in bed. A/Ox4. No c/o pain or SOB at this time. 96% SpO2 on 3LNC. Patient oriented to room and call light. Patient aware to use call light to communicate any pain or needs.     2155: Scheduled med given. B; No insulin coverage needed per protocol. Pt's switched to his CPAP per request. No other needs requested at this time.     2230: Pt asleep; easily awaken. No distress noted at this time. Call light within reach.     2345: No changes from previous assessment. Pt asleep; easily awaken. No distress noted at this time. Call light within reach.    0035: episode of 10 beats of VTACH. Assessed pt, no c/o chest pain or SOB. Pt Alert and awake. MD paged; waiting for call back.     0045: Dr. Carlos MD called back; notified her regarding pt's episode of VTACH. Order for BMP with mag received.     0115: Blood drawn and sent to lab per order.     0400: No changes from previous assessment. Pt asleep; easily awaken. No distress noted at this time. Call light within reach.    0643: Scheduled med given. No distress noted at this time.     0720: Bedside shift change report given to Bari

## 2024-04-17 ENCOUNTER — HOME HEALTH ADMISSION (OUTPATIENT)
Age: 69
End: 2024-04-17
Payer: MEDICARE

## 2024-04-17 VITALS
OXYGEN SATURATION: 97 % | RESPIRATION RATE: 19 BRPM | WEIGHT: 225.1 LBS | HEART RATE: 61 BPM | DIASTOLIC BLOOD PRESSURE: 60 MMHG | BODY MASS INDEX: 33.34 KG/M2 | SYSTOLIC BLOOD PRESSURE: 145 MMHG | TEMPERATURE: 98.5 F | HEIGHT: 69 IN

## 2024-04-17 LAB
ANION GAP SERPL CALC-SCNC: 3 MMOL/L (ref 3–18)
BACTERIA SPEC CULT: NORMAL
BACTERIA SPEC CULT: NORMAL
BASOPHILS # BLD: 0.1 K/UL (ref 0–0.1)
BASOPHILS NFR BLD: 1 % (ref 0–2)
BUN SERPL-MCNC: 29 MG/DL (ref 7–18)
BUN/CREAT SERPL: 12 (ref 12–20)
CALCIUM SERPL-MCNC: 8.2 MG/DL (ref 8.5–10.1)
CHLORIDE SERPL-SCNC: 108 MMOL/L (ref 100–111)
CO2 SERPL-SCNC: 28 MMOL/L (ref 21–32)
CREAT SERPL-MCNC: 2.49 MG/DL (ref 0.6–1.3)
DIFFERENTIAL METHOD BLD: ABNORMAL
EOSINOPHIL # BLD: 0.3 K/UL (ref 0–0.4)
EOSINOPHIL NFR BLD: 3 % (ref 0–5)
ERYTHROCYTE [DISTWIDTH] IN BLOOD BY AUTOMATED COUNT: 16.4 % (ref 11.6–14.5)
GLUCOSE BLD STRIP.AUTO-MCNC: 135 MG/DL (ref 70–110)
GLUCOSE BLD STRIP.AUTO-MCNC: 87 MG/DL (ref 70–110)
GLUCOSE SERPL-MCNC: 89 MG/DL (ref 74–99)
HCT VFR BLD AUTO: 25.3 % (ref 36–48)
HGB BLD-MCNC: 7.7 G/DL (ref 13–16)
IMM GRANULOCYTES # BLD AUTO: 0.1 K/UL (ref 0–0.04)
IMM GRANULOCYTES NFR BLD AUTO: 1 % (ref 0–0.5)
LYMPHOCYTES # BLD: 1.2 K/UL (ref 0.9–3.6)
LYMPHOCYTES NFR BLD: 11 % (ref 21–52)
MCH RBC QN AUTO: 26.6 PG (ref 24–34)
MCHC RBC AUTO-ENTMCNC: 30.4 G/DL (ref 31–37)
MCV RBC AUTO: 87.2 FL (ref 78–100)
MONOCYTES # BLD: 1 K/UL (ref 0.05–1.2)
MONOCYTES NFR BLD: 10 % (ref 3–10)
NEUTS SEG # BLD: 8 K/UL (ref 1.8–8)
NEUTS SEG NFR BLD: 75 % (ref 40–73)
NRBC # BLD: 0 K/UL (ref 0–0.01)
NRBC BLD-RTO: 0 PER 100 WBC
PLATELET # BLD AUTO: 226 K/UL (ref 135–420)
PMV BLD AUTO: 10.3 FL (ref 9.2–11.8)
POTASSIUM SERPL-SCNC: 4 MMOL/L (ref 3.5–5.5)
RBC # BLD AUTO: 2.9 M/UL (ref 4.35–5.65)
SERVICE CMNT-IMP: NORMAL
SERVICE CMNT-IMP: NORMAL
SODIUM SERPL-SCNC: 139 MMOL/L (ref 136–145)
WBC # BLD AUTO: 10.6 K/UL (ref 4.6–13.2)

## 2024-04-17 PROCEDURE — 82962 GLUCOSE BLOOD TEST: CPT

## 2024-04-17 PROCEDURE — 85025 COMPLETE CBC W/AUTO DIFF WBC: CPT

## 2024-04-17 PROCEDURE — 6370000000 HC RX 637 (ALT 250 FOR IP): Performed by: INTERNAL MEDICINE

## 2024-04-17 PROCEDURE — 80048 BASIC METABOLIC PNL TOTAL CA: CPT

## 2024-04-17 PROCEDURE — C9113 INJ PANTOPRAZOLE SODIUM, VIA: HCPCS | Performed by: PHYSICIAN ASSISTANT

## 2024-04-17 PROCEDURE — 97530 THERAPEUTIC ACTIVITIES: CPT

## 2024-04-17 PROCEDURE — 6360000002 HC RX W HCPCS: Performed by: PHYSICIAN ASSISTANT

## 2024-04-17 PROCEDURE — 94761 N-INVAS EAR/PLS OXIMETRY MLT: CPT

## 2024-04-17 PROCEDURE — 2700000000 HC OXYGEN THERAPY PER DAY

## 2024-04-17 PROCEDURE — A4216 STERILE WATER/SALINE, 10 ML: HCPCS | Performed by: PHYSICIAN ASSISTANT

## 2024-04-17 PROCEDURE — 99239 HOSP IP/OBS DSCHRG MGMT >30: CPT | Performed by: STUDENT IN AN ORGANIZED HEALTH CARE EDUCATION/TRAINING PROGRAM

## 2024-04-17 PROCEDURE — 6370000000 HC RX 637 (ALT 250 FOR IP): Performed by: PHYSICIAN ASSISTANT

## 2024-04-17 PROCEDURE — 36415 COLL VENOUS BLD VENIPUNCTURE: CPT

## 2024-04-17 PROCEDURE — 6360000002 HC RX W HCPCS: Performed by: INTERNAL MEDICINE

## 2024-04-17 PROCEDURE — 2580000003 HC RX 258: Performed by: PHYSICIAN ASSISTANT

## 2024-04-17 PROCEDURE — 97116 GAIT TRAINING THERAPY: CPT

## 2024-04-17 PROCEDURE — 6370000000 HC RX 637 (ALT 250 FOR IP): Performed by: STUDENT IN AN ORGANIZED HEALTH CARE EDUCATION/TRAINING PROGRAM

## 2024-04-17 RX ORDER — HYDRALAZINE HYDROCHLORIDE 100 MG/1
100 TABLET, FILM COATED ORAL EVERY 8 HOURS SCHEDULED
Qty: 90 TABLET | Refills: 3 | Status: SHIPPED | OUTPATIENT
Start: 2024-04-17

## 2024-04-17 RX ORDER — ISOSORBIDE DINITRATE 40 MG/1
40 TABLET ORAL 3 TIMES DAILY
Qty: 90 TABLET | Refills: 3 | Status: SHIPPED | OUTPATIENT
Start: 2024-04-17

## 2024-04-17 RX ORDER — LOSARTAN POTASSIUM 25 MG/1
25 TABLET ORAL DAILY
Qty: 30 TABLET | Refills: 3 | Status: SHIPPED | OUTPATIENT
Start: 2024-04-18

## 2024-04-17 RX ADMIN — POTASSIUM CHLORIDE 20 MEQ: 1500 TABLET, EXTENDED RELEASE ORAL at 14:40

## 2024-04-17 RX ADMIN — ISOSORBIDE DINITRATE 40 MG: 10 TABLET ORAL at 09:32

## 2024-04-17 RX ADMIN — HYDRALAZINE HYDROCHLORIDE 100 MG: 50 TABLET ORAL at 04:59

## 2024-04-17 RX ADMIN — PANTOPRAZOLE SODIUM 40 MG: 40 INJECTION, POWDER, FOR SOLUTION INTRAVENOUS at 09:31

## 2024-04-17 RX ADMIN — BUMETANIDE 1 MG: 0.25 INJECTION INTRAMUSCULAR; INTRAVENOUS at 09:32

## 2024-04-17 RX ADMIN — SERTRALINE HYDROCHLORIDE 50 MG: 50 TABLET ORAL at 09:33

## 2024-04-17 RX ADMIN — AMLODIPINE BESYLATE 10 MG: 10 TABLET ORAL at 09:32

## 2024-04-17 RX ADMIN — POTASSIUM CHLORIDE 20 MEQ: 1500 TABLET, EXTENDED RELEASE ORAL at 09:45

## 2024-04-17 RX ADMIN — ISOSORBIDE DINITRATE 40 MG: 10 TABLET ORAL at 14:40

## 2024-04-17 RX ADMIN — LOSARTAN POTASSIUM 25 MG: 25 TABLET, FILM COATED ORAL at 09:32

## 2024-04-17 RX ADMIN — HYDRALAZINE HYDROCHLORIDE 100 MG: 50 TABLET ORAL at 14:40

## 2024-04-17 ASSESSMENT — PAIN SCALES - GENERAL
PAINLEVEL_OUTOF10: 0

## 2024-04-17 NOTE — PLAN OF CARE
Problem: Safety - Adult  Goal: Free from fall injury  4/17/2024 0200 by Nori Griffin RN  Outcome: Progressing  4/16/2024 1259 by Bari Rosenberg RN  Outcome: Progressing     Problem: Discharge Planning  Goal: Discharge to home or other facility with appropriate resources  4/17/2024 0200 by Nori Griffin RN  Outcome: Progressing  Flowsheets (Taken 4/16/2024 1935)  Discharge to home or other facility with appropriate resources:   Identify barriers to discharge with patient and caregiver   Identify discharge learning needs (meds, wound care, etc)  4/16/2024 1259 by Bari Rosenberg RN  Outcome: Progressing     Problem: Skin/Tissue Integrity  Goal: Absence of new skin breakdown  Description: 1.  Monitor for areas of redness and/or skin breakdown  2.  Assess vascular access sites hourly  3.  Every 4-6 hours minimum:  Change oxygen saturation probe site  4.  Every 4-6 hours:  If on nasal continuous positive airway pressure, respiratory therapy assess nares and determine need for appliance change or resting period.  4/17/2024 0200 by Nori Griffin RN  Outcome: Progressing  4/16/2024 1259 by Bari Rosenberg RN  Outcome: Progressing     Problem: ABCDS Injury Assessment  Goal: Absence of physical injury  4/17/2024 0200 by Nori Griffin RN  Outcome: Progressing  4/16/2024 1259 by Bari Rosenberg RN  Outcome: Progressing     Problem: Pain  Goal: Verbalizes/displays adequate comfort level or baseline comfort level  4/17/2024 0200 by Nori Griffin RN  Outcome: Progressing  Flowsheets  Taken 4/16/2024 2350  Verbalizes/displays adequate comfort level or baseline comfort level:   Encourage patient to monitor pain and request assistance   Assess pain using appropriate pain scale   Implement non-pharmacological measures as appropriate and evaluate response  Taken 4/16/2024 1935  Verbalizes/displays adequate comfort level or baseline comfort level:   Encourage patient to monitor pain and request  assistance   Assess pain using appropriate pain scale   Implement non-pharmacological measures as appropriate and evaluate response  4/16/2024 1259 by Bari Rosenberg RN  Outcome: Progressing  Flowsheets (Taken 4/16/2024 0400 by Princess Evelyn Webster RN)  Verbalizes/displays adequate comfort level or baseline comfort level:   Encourage patient to monitor pain and request assistance   Assess pain using appropriate pain scale   Implement non-pharmacological measures as appropriate and evaluate response     Problem: Chronic Conditions and Co-morbidities  Goal: Patient's chronic conditions and co-morbidity symptoms are monitored and maintained or improved  4/17/2024 0200 by Nori Griffin, RN  Outcome: Progressing  Flowsheets (Taken 4/16/2024 1935)  Care Plan - Patient's Chronic Conditions and Co-Morbidity Symptoms are Monitored and Maintained or Improved:   Monitor and assess patient's chronic conditions and comorbid symptoms for stability, deterioration, or improvement   Collaborate with multidisciplinary team to address chronic and comorbid conditions and prevent exacerbation or deterioration  4/16/2024 1259 by Bari Rosenberg, RN  Outcome: Progressing

## 2024-04-17 NOTE — PROGRESS NOTES
1920: Bedside and Verbal shift change report given to FRANSISCA Monk/ FRANSISCA Julio (oncoming nurse) by FRANSISCA Candelaria (offgoing nurse). Report included the following information Nurse Handoff Report, Intake/Output, MAR, Recent Results, Med Rec Status, and Cardiac Rhythm Afib / Sinus Charles    1935: Shift assessment performed. Pt is in bed, alert & oriented x 4. Pt has no complain of pain and SOB. Call light within reach.     2059: Scheduled meds given. Pt has no complain of pain and SOB. Call light within reach.    2350: Reassessment performed. No changes from previous assessment.    0445: Reassessment done. Pt /73; scheduled BP med given earlier. Pt denies pain and SOB.     0730: Bedside and Verbal shift change report given to FRANSISCA Candelaria (oncoming nurse) by FRANSISCA Monk/ FRANSISCA Julio (offgoing nurse). Report included the following information Nurse Handoff Report, Intake/Output, MAR, Recent Results, Med Rec Status, and Cardiac Rhythm Afib/ Sinus Charles .

## 2024-04-17 NOTE — CARE COORDINATION
Discharge orders noted.  Met with pt and wife at bedside.  PTOT recommending SNF.  Discussed with pt and wife.  Pt stated he wants to return home.  Wife wants him to go to [facility].  Pt stated again that he wants to return home with [facility].  Pt stated he wants to see his main doctor.  Notified Dr. Espinoza.        Home health orders placed in queue to [facility]            Discharge order noted for today. Pt has been accepted to [facility]. Met with patient and his wife and are agreeable to the transition plan today. Transport has been arranged through pt's wife. Patient's discharge summary and home health  orders have been forwarded to  [facility] via . Updated bedside RN, Mele,  to the transition plan.  Discharge information has been documented on the AVS.               JOSÉ MIGUEL OrtizN RN  Care Management

## 2024-04-17 NOTE — PROGRESS NOTES
0730- Bedside and Verbal shift change report given to Mele RN (oncoming nurse) by  RN (offgoing nurse). Report included the following information Nurse Handoff Report, Adult Overview, Intake/Output, MAR, Recent Results, Cardiac Rhythm Slow Afib, and Neuro Assessment.     0830-Pt in bed alert and oriented times 4 with bed locked and low and call bell in reach. Needs addressed, denies pain.      0932- Pt medicated per MAR.     1200- Pt in bed alert and oriented times 4 with bed locked and low and call bell in reach. Needs addressed, denies pain.     1440- Pt medicated per MAR.       1500- Pt in bed alert and oriented times 4 with bed locked and low and call bell in reach. Needs addressed, denies pain.     1510- Pt educated on AVS, home meds, follow up appointments. The opportunity to ask questions was offered and answer were provided. Pt expresses that he is eager for discharge. Ivs removed.       1535- Pt off unit by wheelchair with nurse. Pts wife is driving him home.

## 2024-04-17 NOTE — PLAN OF CARE
Problem: Physical Therapy - Adult  Goal: By Discharge: Performs mobility at highest level of function for planned discharge setting.  See evaluation for individualized goals.  Description: Physical Therapy Goals:  Initiated 4/16/2024 to be met within 7-10 days.    1.  Patient will move from supine to sit and sit to supine , scoot up and down, and roll side to side in bed with independence.    2.  Patient will transfer from bed to chair and chair to bed with contact guard assist using the least restrictive device.  3.  Patient will perform sit to stand with contact guard assist.  4.  Patient will ambulate with contact guard assist for 30 feet with the least restrictive device.   5.  Patient will ascend/descend 1 stairs with quad cane with contact guard assist.    PLOF: lives with wife and sister in law in 1 level home; uses quad cane; Ginger PTA      Outcome: Progressing       PHYSICAL THERAPY TREATMENT    Patient: Amish Shetty (68 y.o. male)  Date: 4/17/2024  Diagnosis: Acute decompensated heart failure (HCC) [I50.9] <principal problem not specified>  Procedure(s) (LRB):  ESOPHAGOGASTRODUODENOSCOPY w/Cauterization of AVM w/Genii and Clip placement x2 (N/A) 2 Days Post-Op  Precautions: Fall Risk,  ,  ,  ,  ,  ,  ,      ASSESSMENT:  Patient seen for PT follow up session with focus on progressing OOB mobility. Received supine; agreeable. Able to complete bed mobility with SBA-Ravi, transfers with CGA, and short distance ambulation using quad cane with Ravi. Demonstrates moderate postural instability, requiring cues for hand placement, balance awareness and proper LE positioning to increase stability. Reports \"legs feel like jell-o\" when ambulating but improved stability noted with repeat trials. Education provided on activity pacing and fall prevention strategies. Continue PT POC.    Progression toward goals:   []      Improving appropriately and progressing toward goals  [x]      Improving slowly and progressing

## 2024-04-17 NOTE — PLAN OF CARE
Problem: Safety - Adult  Goal: Free from fall injury  4/17/2024 1154 by Bari Rosenberg RN  Outcome: Progressing  4/17/2024 0200 by Nori Griffin RN  Outcome: Progressing     Problem: Discharge Planning  Goal: Discharge to home or other facility with appropriate resources  4/17/2024 1154 by Bari Rosenberg RN  Outcome: Progressing  4/17/2024 0200 by Nori Griffin RN  Outcome: Progressing  Flowsheets (Taken 4/16/2024 1935)  Discharge to home or other facility with appropriate resources:   Identify barriers to discharge with patient and caregiver   Identify discharge learning needs (meds, wound care, etc)     Problem: Skin/Tissue Integrity  Goal: Absence of new skin breakdown  Description: 1.  Monitor for areas of redness and/or skin breakdown  2.  Assess vascular access sites hourly  3.  Every 4-6 hours minimum:  Change oxygen saturation probe site  4.  Every 4-6 hours:  If on nasal continuous positive airway pressure, respiratory therapy assess nares and determine need for appliance change or resting period.  4/17/2024 1154 by Bari Rosenberg RN  Outcome: Progressing  4/17/2024 0200 by Nori Griffin RN  Outcome: Progressing     Problem: ABCDS Injury Assessment  Goal: Absence of physical injury  4/17/2024 1154 by Bari Rosenberg RN  Outcome: Progressing  4/17/2024 0200 by Nori Griffin RN  Outcome: Progressing     Problem: Pain  Goal: Verbalizes/displays adequate comfort level or baseline comfort level  4/17/2024 1154 by Bari Rosenberg RN  Outcome: Progressing  Flowsheets (Taken 4/17/2024 0445 by Princess Evelyn Webster RN)  Verbalizes/displays adequate comfort level or baseline comfort level:   Encourage patient to monitor pain and request assistance   Assess pain using appropriate pain scale   Implement non-pharmacological measures as appropriate and evaluate response  4/17/2024 0200 by Nori Griffin RN  Outcome: Progressing  Flowsheets  Taken 4/16/2024 2350  Verbalizes/displays

## 2024-04-17 NOTE — DISCHARGE SUMMARY
Discharge Summary    Patient: Amish Shetty MRN: [id number]  CSN: [id number]    YOB: 1955  Age: 68 y.o.  Sex: male    DOA: 4/8/2024 LOS:  LOS: 9 days   Discharge Date: 4/17/2024      Admission Diagnosis: Acute decompensated heart failure (HCC) [I50.9]    Discharge Diagnosis:  Sepsis  Acute on chronic iron deficiency anemia  Hypokalemia  Anasarca  Acute HFpEF  Hypertension  Type 2 diabetes  CVA  A-fib  Depression  CKD now ESRD?  Pericardial effusion without tamponade    Discharge Condition: Stable    Discharge Disposition: Home    PHYSICAL EXAM    Visit Vitals  BP (!) 145/60   Pulse 61   Temp 98.5 °F (36.9 °C) (Temporal)   Resp 19   Ht 1.753 m (5' 9\")   Wt 102.1 kg (225 lb 1.6 oz)   SpO2 97%   BMI 33.24 kg/m²     HEENT: Normal HEENT exam.    Lungs:  Normal effort and normal respiratory rate.  Breath sounds clear to auscultation.    Heart: Normal rate.  Regular rhythm.  S1 normal and S2 normal.  No murmur, gallop or friction rub.   Chest: Symmetric chest wall expansion.   Abdomen: Abdomen is soft and non-distended.  Bowel sounds are normal.   There is no abdominal tenderness.     Extremities: Normal range of motion.  There is dependent edema.    Pulses: Distal pulses are intact.    Neurological: Patient is alert and oriented to person, place and time.  Normal strength.    Pupils:  Pupils are equal, round, and reactive to light.    Skin:  Warm and dry.         Hospital Course By Problem:   Patient presented to the emergency room due to worsening shortness of breath over 1 day.  Was found to have fluid overload along with severe anemia. In the emergency room patient worked up in usual manner and found to have severe low hemoglobin.  Patient was admitted and transfused appropriately.  Concern of her GI bleed given patient's presentation.  Patient also had significant iron deficiency.  Diuresis was attempted however patient was unable to be adequately diuresed due to kidney injury.  Temporary  tablet by mouth daily            CHANGE how you take these medications      hydrALAZINE 100 MG tablet  Commonly known as: APRESOLINE  Take 1 tablet by mouth every 8 hours  What changed: when to take this            CONTINUE taking these medications      amLODIPine 10 MG tablet  Commonly known as: NORVASC     aspirin 81 MG chewable tablet     ferrous sulfate 325 (65 Fe) MG tablet  Commonly known as: IRON 325     finasteride 5 MG tablet  Commonly known as: PROSCAR  Take 1 tablet by mouth daily     fluticasone 50 MCG/ACT nasal spray  Commonly known as: FLONASE     furosemide 40 MG tablet  Commonly known as: Lasix  Take 1 tablet by mouth 2 times daily     Lancets Misc     MULTIVITAMIN PO     OXYGEN     sertraline 50 MG tablet  Commonly known as: ZOLOFT     tiZANidine 4 MG tablet  Commonly known as: ZANAFLEX            STOP taking these medications      neomycin-bacitracin-polymyxin 5-400-5000 ointment               Where to Get Your Medications        These medications were sent to [facility]/pharmacy #[id number] - [place], [place] - [address] [place] [address] -  [phone] - F [phone]  [address] [place] [id number]      Phone: [phone]   hydrALAZINE 100 MG tablet  isosorbide dinitrate 40 MG tablet  losartan 25 MG tablet         Activity: activity as tolerated    Functional status and cognitive function:    Status: alert, appears stated age, and cooperative    Diet: renal diet    Wound Care: as directed        Follow-up: with PCP, Caleb Hurst MD in 7-10days      Minutes spent on discharge: >30 minutes spent coordinating this discharge (review instructions/follow-up, prescriptions, preparing report for sign off)

## 2024-04-17 NOTE — PLAN OF CARE
Problem: Safety - Adult  Goal: Free from fall injury  4/17/2024 1304 by Phoebe Haines GN  Outcome: Completed  4/17/2024 1154 by Bari Rosenberg RN  Outcome: Progressing  4/17/2024 0200 by Nori Griffin RN  Outcome: Progressing     Problem: Discharge Planning  Goal: Discharge to home or other facility with appropriate resources  4/17/2024 1304 by Phoebe Haines GN  Outcome: Completed  4/17/2024 1154 by Bari Rosenberg RN  Outcome: Progressing  4/17/2024 0200 by Nori Griffin RN  Outcome: Progressing  Flowsheets (Taken 4/16/2024 1935)  Discharge to home or other facility with appropriate resources:   Identify barriers to discharge with patient and caregiver   Identify discharge learning needs (meds, wound care, etc)     Problem: Skin/Tissue Integrity  Goal: Absence of new skin breakdown  Description: 1.  Monitor for areas of redness and/or skin breakdown  2.  Assess vascular access sites hourly  3.  Every 4-6 hours minimum:  Change oxygen saturation probe site  4.  Every 4-6 hours:  If on nasal continuous positive airway pressure, respiratory therapy assess nares and determine need for appliance change or resting period.  4/17/2024 1304 by Phoebe Haines GN  Outcome: Completed  4/17/2024 1154 by Bari Rosenberg RN  Outcome: Progressing  4/17/2024 0200 by Nori Griffin RN  Outcome: Progressing     Problem: ABCDS Injury Assessment  Goal: Absence of physical injury  4/17/2024 1304 by Phoebe Haines GN  Outcome: Completed  4/17/2024 1154 by Bari Rosenberg RN  Outcome: Progressing  4/17/2024 0200 by Nori Griffin RN  Outcome: Progressing     Problem: Pain  Goal: Verbalizes/displays adequate comfort level or baseline comfort level  4/17/2024 1304 by Phoebe Haines GN  Outcome: Completed  4/17/2024 1154 by Bari Rosenberg RN  Outcome: Progressing  Flowsheets (Taken 4/17/2024 0445 by Princess Evelyn Webster RN)  Verbalizes/displays adequate comfort level or baseline comfort level:    Encourage patient to monitor pain and request assistance   Assess pain using appropriate pain scale   Implement non-pharmacological measures as appropriate and evaluate response  4/17/2024 0200 by Nori Griffin RN  Outcome: Progressing  Flowsheets  Taken 4/16/2024 2350  Verbalizes/displays adequate comfort level or baseline comfort level:   Encourage patient to monitor pain and request assistance   Assess pain using appropriate pain scale   Implement non-pharmacological measures as appropriate and evaluate response  Taken 4/16/2024 1935  Verbalizes/displays adequate comfort level or baseline comfort level:   Encourage patient to monitor pain and request assistance   Assess pain using appropriate pain scale   Implement non-pharmacological measures as appropriate and evaluate response     Problem: Chronic Conditions and Co-morbidities  Goal: Patient's chronic conditions and co-morbidity symptoms are monitored and maintained or improved  4/17/2024 1304 by Phoebe Haines GN  Outcome: Completed  4/17/2024 1154 by Bari Rosenberg, RN  Outcome: Progressing  4/17/2024 0200 by Nori Griffin RN  Outcome: Progressing  Flowsheets (Taken 4/16/2024 1935)  Care Plan - Patient's Chronic Conditions and Co-Morbidity Symptoms are Monitored and Maintained or Improved:   Monitor and assess patient's chronic conditions and comorbid symptoms for stability, deterioration, or improvement   Collaborate with multidisciplinary team to address chronic and comorbid conditions and prevent exacerbation or deterioration

## 2024-04-17 NOTE — HOME CARE
Home care referral received for PT/OT. Chart reviewed.Met with to Patient Verified address and telephone numbers. Explained services ordered and agency routines. Orders noted and arranged. Discussed DME that the patient already owns or ordered.     Left agency contact info at bedside      Gini Kaplan RN, BSN   [facility]

## 2024-04-17 NOTE — PROGRESS NOTES
1157: Pt weaned down from 3L NC to 2L NC. Reassessing O2 at 1230.   1231: Pt O2 reassessed. O2 reading 97%. Pt reports no difficulty breathing and does not have any complaints at this time.

## 2024-04-17 NOTE — PROGRESS NOTES
Patient was provided with discharge instructions and education. Follow up care and home medications were discussed. The patient verbalized understanding of the discharge information. Time for questions was provided. No questions voiced at this time. The patient was discharged home via wheelchair with wife.

## 2024-04-18 ENCOUNTER — HOME CARE VISIT (OUTPATIENT)
Age: 69
End: 2024-04-18

## 2024-04-18 PROCEDURE — G0151 HHCP-SERV OF PT,EA 15 MIN: HCPCS

## 2024-04-18 PROCEDURE — 0221000100 HH NO PAY CLAIM PROCEDURE

## 2024-04-18 ASSESSMENT — ENCOUNTER SYMPTOMS
DYSPNEA ACTIVITY LEVEL: AFTER AMBULATING LESS THAN 10 FT
BOWEL INCONTINENCE: 1
PAIN LOCATION - PAIN QUALITY: TENDER

## 2024-04-18 NOTE — HOME HEALTH
stand with multiple attempts needed to rise from chair d/t LE weakness  GAIT: X80ft inside with quad cane and ~100ft outside with quad cane and sup/SBA.  Gait characterized by hemiparetic gait on R side. Pt with 1 stumble when entering back into the home and R foot caught on the threshold requiring my intervention to prevent a fall.  Cues/instruction provided for pacing self and increasing foot clearance on the R LE to prevent falls.    STAIRS:  up/down 3 steps with 1 hand rail and quad cane with SBA descending the stairs and CGA with ascending stairs. Pt reports he prefers leading down with the left and leading up with the right despite significant weakness in the R LE(involved extremity) placing him at higher risk for falls. Will continue to try to retrain pt for safer stair climbing.   BALANCE:  Pt scored 12/28 on Tinetti Balance Assessment placing patient at high risk for falls.   PATIENT EDUCATION PROVIDED THIS VISIT:  Home safety to include scooting chair against something before he performs a sit/stand transfer d/t it sliding, elevating the couch onto risers for easier/safer transfers on/off the couch, clamp bar for tub to assists with both toilet and shower transfers, HEP, walking, deep breathing, and elevating R UE when not up walking around  PATIENT RESPONSE TO EDUCATION: Pt verbalized understanding but needs ongoing encouragement to follow through with home modification recommendations     HEP consisting of:  1. Walking every hour during the day with quad cane   2. stand up every hour when awake for pressure relief  3. Deep breathing   Written HEP issued, patient/caregiver verbalized understanding; however, will need reinforcement and continued education for progressionof strengthening and endurance trianing.   PATIENT RESPONSE TO TREATMENT: Pt with increased SOB after walking in the home with SpO2 dropping to 83% on room air with <30 sec seated recovery.  ASSESSMENT AND CONTINUED NEED FOR THE FOLLOWING

## 2024-04-19 VITALS
RESPIRATION RATE: 17 BRPM | OXYGEN SATURATION: 96 % | HEART RATE: 56 BPM | SYSTOLIC BLOOD PRESSURE: 140 MMHG | DIASTOLIC BLOOD PRESSURE: 80 MMHG | TEMPERATURE: 97.8 F

## 2024-04-22 ENCOUNTER — HOME CARE VISIT (OUTPATIENT)
Age: 69
End: 2024-04-22

## 2024-04-22 VITALS
TEMPERATURE: 98.4 F | DIASTOLIC BLOOD PRESSURE: 87 MMHG | SYSTOLIC BLOOD PRESSURE: 145 MMHG | HEART RATE: 61 BPM | OXYGEN SATURATION: 98 %

## 2024-04-22 PROCEDURE — G0157 HHC PT ASSISTANT EA 15: HCPCS

## 2024-04-23 NOTE — HOME HEALTH
SUBJECTIVE: No complaints of pain, no falls, no changes in medications   CAREGIVER INVOLVEMENT/ASSISTANCE NEEDED FOR: Spouse assist pt as needed   OBJECTIVE: See interventions  PATIENT EDUCATION PROVIDED THIS VISIT: Pt educated on fall prevention strategies, CHF lifetstyle. breathing tech and  Edema management. .   PATIENT RESPONSE TO EDUCATION: Pt verbalizes understanding of education   PATIENT RESPONSE TO TREATMENT/ASSESSMENT OF PROGRESS TOWARD GOALS: Established HEP consisting of standing strengthening to bilateral LEs instruction to improve tech and safety pt tends to rush through exercises. Stair negotiation requires Assist to and VCs to improve safety, gait tng outside on uneven surfaces deficits include decreaesd foot clearance, quick nell assist and AD to needed pt fatigues/SOB instruction for pursded lip breathing and to pace himself. O2 saturation 96-98%. Skilled PT intervention needed to address education, deficits in strength, gait, transfers and balance to improve functional mobility and safety and lower risk for falls or injury.   PLAN FOR NEXT VISIT: Next visit will address balance, gait and strength  DISCHARGE PLANS: POC and Discharge plans discussed pt understands and agrees eventual DC once goals have been met or max HC benefits have been achieved. 7 visits remaining anticipated DC 5/16/2024

## 2024-04-24 NOTE — PROGRESS NOTES
Physician Progress Note      PATIENT:               VIC ZAMORANO  CSN #:                  [id number]  :                       1955  ADMIT DATE:       2024 8:47 AM  DISCH DATE:        2024 4:58 PM  RESPONDING  PROVIDER #:        Elias Espinoza MD          QUERY TEXT:    Patient admitted with shortness of breath and anemia.  Noted documentation of   sepsis on DC summary and by attending on  (3 days after admission),   however, unclear source. If possible, please document in progress notes and   discharge summary the source of sepsis:    The medical record reflects the following:  Risk Factors: 68 y.o. male with hx of iron deficiency anemia, hypertension,   CKD with proteinuria status post biopsy which shows amyloidosis, history of   CVA with lingering right arm weakness to ED with SOB.  Clinical Indicators:  Med: Sepsis (fever +leukocytosis) unclear source, ?   Pulmonary source given respiratory symptoms. Resolved. UA negative, blood cx,   urine cx pending Respiratory virus panel negative. Started on CAP abx.   Chest XRAY - Increasing RIGHT pleural effusion. Otherwise similar hazy   bilateral pulmonary opacities. Moderate pulmonary vascular congestion..  WBC upon admit  - 12.4,  - 11.4, 4/10 - 16.8  RR  - 24, 23, 28  Temp 4/10 - 101.2 oral,  - 100.5 ax  Treatment: 5 day course of antibx Zithromax -    Thank you,  Delores Limon RN, CDI  [email or web address]  >  Options provided:  -- Sepsis, present on admission, due to, Please document source.  -- Sepsis, present on admission, due to possible pneumonia  -- Sepsis, not present on admission due to, Please document source.  -- Sepsis, not present on admission, due to possible pneumonia  -- Sepsis was ruled out  -- Other - I will add my own diagnosis  -- Disagree - Not applicable / Not valid  -- Disagree - Clinically unable to determine / Unknown  -- Refer to Clinical Documentation Reviewer    PROVIDER RESPONSE

## 2024-04-26 ENCOUNTER — HOME CARE VISIT (OUTPATIENT)
Age: 69
End: 2024-04-26

## 2024-04-26 VITALS
DIASTOLIC BLOOD PRESSURE: 70 MMHG | OXYGEN SATURATION: 98 % | TEMPERATURE: 97.7 F | SYSTOLIC BLOOD PRESSURE: 155 MMHG | HEART RATE: 68 BPM

## 2024-04-26 PROCEDURE — G0157 HHC PT ASSISTANT EA 15: HCPCS

## 2024-04-26 NOTE — HOME HEALTH
SUBJECTIVE: No complaints of pain, no falls, no changes in mediciaons, pt c/o fatigue due to MD appt this am  CAREGIVER INVOLVEMENT/ASSISTANCE NEEDED FOR: Spouse assist pt as needed   OBJECTIVE: See interventions  PATIENT EDUCATION PROVIDED THIS VISIT: Pt educated on fall prevention, edema management and CHF lifestyle  PATIENT RESPONSE TO EDUCATION: Pt able to teach back education    PATIENT RESPONSE TO TREATMENT/ASSESSMENT OF PROGRESS TOWARD GOALS: Pt Instructed in standing strengthening to bilateral LEs alternating on balance board to improve strength and balance CGA provided, balance tng activity multi directional resistance, wt shifting with feet hip width apart, side stepping onto and off of balance board.  head turns on balance board.  Loss of balance x 3 with balance act. Gait tng on even surfaces w/ small based quad cane VCs to slow nell and take smaller steps.  Pt tolerated tx session well, c/o 6/10 fatigue, pt is sometimes impulsive and moves quickly multiple VCs to slow down and to take his time. Skilled PT intervention needed to address deficits in strength, gait, transfers and balance to improve functional mobility and safety and lower risk for falls or injury.   PLAN FOR NEXT VISIT: Next visit will address balance, gait and endurance  DISCHARGE PLANS: POC and Discharge plans discussed pt understands and agrees eventual DC once goals have been met or max HC benefits have been achieved. 5 visits remaining anticipated DC 5/16/2024

## 2024-04-30 ENCOUNTER — HOME CARE VISIT (OUTPATIENT)
Age: 69
End: 2024-04-30
Payer: MEDICARE

## 2024-04-30 VITALS
TEMPERATURE: 99 F | OXYGEN SATURATION: 98 % | HEART RATE: 62 BPM | DIASTOLIC BLOOD PRESSURE: 87 MMHG | SYSTOLIC BLOOD PRESSURE: 157 MMHG

## 2024-04-30 PROCEDURE — G0157 HHC PT ASSISTANT EA 15: HCPCS

## 2024-04-30 NOTE — HOME HEALTH
SUBJECTIVE: No complaints of pain, no falls no changes in medications, noted SOB and increased edema in RUE  CAREGIVER INVOLVEMENT/ASSISTANCE NEEDED FOR: Spouse assist pt as needed   OBJECTIVE: See interventions  PATIENT EDUCATION PROVIDED THIS VISIT: Pt educated on fall prevention strategies, CHF lifestyle changes, edema management, and breathing tech. Pt and Cg asked to purchase a scale for pt to weigh himself daily instructed to contact PCP if he gains 2 lbs in a 24hr period or 5lbs in a week.   PATIENT RESPONSE TO EDUCATION: Pt verbalizes understanding of education   PATIENT RESPONSE TO TREATMENT/ASSESSMENT OF PROGRESS TOWARD GOALS: Pt performed gait tng outside w/ small based quad cane, decreaesd gait speed, increased labored breathing.  Stair negotiation to exit/enter home with small based quad cane and rails assist needed to manage stairs. Following gait tng pt c/o feeling weak and lightheaded, Contacted PCP LUCITA Cheema spoke to Maite reported symptoms, Pt placed on 2ltrs of O2 for amulation O2 saturation level 94-98% instruction provided for pursded lip breathing. Skilled PT intervention needed to address swelling, deficits in strength, gait, transfers and balance to improve functional mobility and safety and lower risk for falls or injury.   PLAN FOR NEXT VISIT: Next visit will address balance   DISCHARGE PLANS: POC and Discharge plans discussed pt understands and agrees eventual DC once goals have been met or max HC benefits have been achieved. 5 visits remaining anticipated DC 5/16/2024

## 2024-05-02 ENCOUNTER — APPOINTMENT (OUTPATIENT)
Facility: HOSPITAL | Age: 69
End: 2024-05-02
Payer: MEDICARE

## 2024-05-02 ENCOUNTER — HOME CARE VISIT (OUTPATIENT)
Age: 69
End: 2024-05-02
Payer: MEDICARE

## 2024-05-02 ENCOUNTER — HOSPITAL ENCOUNTER (INPATIENT)
Facility: HOSPITAL | Age: 69
LOS: 7 days | Discharge: NURSING, REHAB OR LONG-TERM CARE | End: 2024-05-09
Attending: HOSPITALIST | Admitting: HOSPITALIST
Payer: MEDICARE

## 2024-05-02 ENCOUNTER — HOSPITAL ENCOUNTER (INPATIENT)
Facility: HOSPITAL | Age: 69
Discharge: HOME | End: 2024-05-05
Payer: MEDICARE

## 2024-05-02 VITALS
OXYGEN SATURATION: 94 % | HEART RATE: 50 BPM | RESPIRATION RATE: 22 BRPM | SYSTOLIC BLOOD PRESSURE: 215 MMHG | DIASTOLIC BLOOD PRESSURE: 81 MMHG

## 2024-05-02 DIAGNOSIS — N18.4 STAGE 4 CHRONIC KIDNEY DISEASE (HCC): ICD-10-CM

## 2024-05-02 DIAGNOSIS — E85.89 OTHER AMYLOIDOSIS (HCC): ICD-10-CM

## 2024-05-02 DIAGNOSIS — R60.9 EDEMA, UNSPECIFIED TYPE: Primary | ICD-10-CM

## 2024-05-02 DIAGNOSIS — R79.89 ELEVATED BRAIN NATRIURETIC PEPTIDE (BNP) LEVEL: ICD-10-CM

## 2024-05-02 PROBLEM — J81.0 ACUTE PULMONARY EDEMA (HCC): Status: ACTIVE | Noted: 2024-05-02

## 2024-05-02 LAB
ALBUMIN SERPL-MCNC: 2.9 G/DL (ref 3.4–5)
ALBUMIN/GLOB SERPL: 0.8 (ref 0.8–1.7)
ALP SERPL-CCNC: 124 U/L (ref 45–117)
ALT SERPL-CCNC: 27 U/L (ref 16–61)
ANION GAP SERPL CALC-SCNC: 8 MMOL/L (ref 3–18)
APPEARANCE UR: CLEAR
AST SERPL-CCNC: 25 U/L (ref 10–38)
BACTERIA URNS QL MICRO: NEGATIVE /HPF
BASOPHILS # BLD: 0.1 K/UL (ref 0–0.1)
BASOPHILS NFR BLD: 1 % (ref 0–2)
BILIRUB SERPL-MCNC: 0.6 MG/DL (ref 0.2–1)
BILIRUB UR QL: NEGATIVE
BUN SERPL-MCNC: 37 MG/DL (ref 7–18)
BUN/CREAT SERPL: 13 (ref 12–20)
CALCIUM SERPL-MCNC: 8.6 MG/DL (ref 8.5–10.1)
CHLORIDE SERPL-SCNC: 108 MMOL/L (ref 100–111)
CO2 SERPL-SCNC: 29 MMOL/L (ref 21–32)
COLOR UR: YELLOW
CREAT SERPL-MCNC: 2.81 MG/DL (ref 0.6–1.3)
DIFFERENTIAL METHOD BLD: ABNORMAL
EKG ATRIAL RATE: 54 BPM
EKG DIAGNOSIS: NORMAL
EKG Q-T INTERVAL: 488 MS
EKG QRS DURATION: 108 MS
EKG QTC CALCULATION (BAZETT): 483 MS
EKG R AXIS: 35 DEGREES
EKG T AXIS: 248 DEGREES
EKG VENTRICULAR RATE: 59 BPM
EOSINOPHIL # BLD: 0.2 K/UL (ref 0–0.4)
EOSINOPHIL NFR BLD: 3 % (ref 0–5)
EPITH CASTS URNS QL MICRO: NEGATIVE /LPF (ref 0–5)
ERYTHROCYTE [DISTWIDTH] IN BLOOD BY AUTOMATED COUNT: 17.9 % (ref 11.6–14.5)
GLOBULIN SER CALC-MCNC: 3.7 G/DL (ref 2–4)
GLUCOSE SERPL-MCNC: 108 MG/DL (ref 74–99)
GLUCOSE UR STRIP.AUTO-MCNC: NEGATIVE MG/DL
HCT VFR BLD AUTO: 24.7 % (ref 36–48)
HGB BLD-MCNC: 7.5 G/DL (ref 13–16)
HGB UR QL STRIP: NEGATIVE
IMM GRANULOCYTES # BLD AUTO: 0 K/UL (ref 0–0.04)
IMM GRANULOCYTES NFR BLD AUTO: 1 % (ref 0–0.5)
INR PPP: 1.2 (ref 0.9–1.1)
KETONES UR QL STRIP.AUTO: NEGATIVE MG/DL
LEUKOCYTE ESTERASE UR QL STRIP.AUTO: NEGATIVE
LYMPHOCYTES # BLD: 0.7 K/UL (ref 0.9–3.6)
LYMPHOCYTES NFR BLD: 8 % (ref 21–52)
MAGNESIUM SERPL-MCNC: 2.3 MG/DL (ref 1.6–2.6)
MCH RBC QN AUTO: 26.2 PG (ref 24–34)
MCHC RBC AUTO-ENTMCNC: 30.4 G/DL (ref 31–37)
MCV RBC AUTO: 86.4 FL (ref 78–100)
MONOCYTES # BLD: 0.7 K/UL (ref 0.05–1.2)
MONOCYTES NFR BLD: 9 % (ref 3–10)
NEUTS SEG # BLD: 6.8 K/UL (ref 1.8–8)
NEUTS SEG NFR BLD: 80 % (ref 40–73)
NITRITE UR QL STRIP.AUTO: NEGATIVE
NRBC # BLD: 0.02 K/UL (ref 0–0.01)
NRBC BLD-RTO: 0.2 PER 100 WBC
NT PRO BNP: ABNORMAL PG/ML (ref 0–900)
PH UR STRIP: 7.5 (ref 5–8)
PLATELET # BLD AUTO: 287 K/UL (ref 135–420)
PMV BLD AUTO: 9.8 FL (ref 9.2–11.8)
POTASSIUM SERPL-SCNC: 3.2 MMOL/L (ref 3.5–5.5)
PROT SERPL-MCNC: 6.6 G/DL (ref 6.4–8.2)
PROT UR STRIP-MCNC: >1000 MG/DL
PROTHROMBIN TIME: 15.6 SEC (ref 11.9–14.7)
RBC # BLD AUTO: 2.86 M/UL (ref 4.35–5.65)
RBC #/AREA URNS HPF: NEGATIVE /HPF (ref 0–5)
SODIUM SERPL-SCNC: 145 MMOL/L (ref 136–145)
SP GR UR REFRACTOMETRY: 1.02 (ref 1–1.03)
TROPONIN I SERPL HS-MCNC: 32 NG/L (ref 0–78)
UROBILINOGEN UR QL STRIP.AUTO: 0.2 EU/DL (ref 0.2–1)
WBC # BLD AUTO: 8.5 K/UL (ref 4.6–13.2)
WBC URNS QL MICRO: NEGATIVE /HPF (ref 0–4)
YEAST URNS QL MICRO: NEGATIVE

## 2024-05-02 PROCEDURE — 85610 PROTHROMBIN TIME: CPT

## 2024-05-02 PROCEDURE — 99285 EMERGENCY DEPT VISIT HI MDM: CPT

## 2024-05-02 PROCEDURE — 94761 N-INVAS EAR/PLS OXIMETRY MLT: CPT

## 2024-05-02 PROCEDURE — 90935 HEMODIALYSIS ONE EVALUATION: CPT

## 2024-05-02 PROCEDURE — 83735 ASSAY OF MAGNESIUM: CPT

## 2024-05-02 PROCEDURE — 0JH63XZ INSERTION OF TUNNELED VASCULAR ACCESS DEVICE INTO CHEST SUBCUTANEOUS TISSUE AND FASCIA, PERCUTANEOUS APPROACH: ICD-10-PCS | Performed by: HOSPITALIST

## 2024-05-02 PROCEDURE — 2580000003 HC RX 258: Performed by: PHYSICIAN ASSISTANT

## 2024-05-02 PROCEDURE — 71045 X-RAY EXAM CHEST 1 VIEW: CPT

## 2024-05-02 PROCEDURE — 93010 ELECTROCARDIOGRAM REPORT: CPT | Performed by: INTERNAL MEDICINE

## 2024-05-02 PROCEDURE — 02HV33Z INSERTION OF INFUSION DEVICE INTO SUPERIOR VENA CAVA, PERCUTANEOUS APPROACH: ICD-10-PCS | Performed by: HOSPITALIST

## 2024-05-02 PROCEDURE — 36557 INSERT TUNNELED CV CATH: CPT

## 2024-05-02 PROCEDURE — 5A1D70Z PERFORMANCE OF URINARY FILTRATION, INTERMITTENT, LESS THAN 6 HOURS PER DAY: ICD-10-PCS | Performed by: HOSPITALIST

## 2024-05-02 PROCEDURE — 1100000003 HC PRIVATE W/ TELEMETRY

## 2024-05-02 PROCEDURE — 2700000000 HC OXYGEN THERAPY PER DAY

## 2024-05-02 PROCEDURE — 93005 ELECTROCARDIOGRAM TRACING: CPT | Performed by: STUDENT IN AN ORGANIZED HEALTH CARE EDUCATION/TRAINING PROGRAM

## 2024-05-02 PROCEDURE — 80053 COMPREHEN METABOLIC PANEL: CPT

## 2024-05-02 PROCEDURE — 83880 ASSAY OF NATRIURETIC PEPTIDE: CPT

## 2024-05-02 PROCEDURE — 2500000003 HC RX 250 WO HCPCS: Performed by: PHYSICIAN ASSISTANT

## 2024-05-02 PROCEDURE — C1750 CATH, HEMODIALYSIS,LONG-TERM: HCPCS

## 2024-05-02 PROCEDURE — 81001 URINALYSIS AUTO W/SCOPE: CPT

## 2024-05-02 PROCEDURE — 85025 COMPLETE CBC W/AUTO DIFF WBC: CPT

## 2024-05-02 PROCEDURE — 99223 1ST HOSP IP/OBS HIGH 75: CPT | Performed by: HOSPITALIST

## 2024-05-02 PROCEDURE — 6370000000 HC RX 637 (ALT 250 FOR IP): Performed by: HOSPITALIST

## 2024-05-02 PROCEDURE — 2580000003 HC RX 258: Performed by: HOSPITALIST

## 2024-05-02 PROCEDURE — 5A09357 ASSISTANCE WITH RESPIRATORY VENTILATION, LESS THAN 24 CONSECUTIVE HOURS, CONTINUOUS POSITIVE AIRWAY PRESSURE: ICD-10-PCS | Performed by: HOSPITALIST

## 2024-05-02 PROCEDURE — 84484 ASSAY OF TROPONIN QUANT: CPT

## 2024-05-02 PROCEDURE — 6360000002 HC RX W HCPCS: Performed by: PHYSICIAN ASSISTANT

## 2024-05-02 RX ORDER — AMLODIPINE BESYLATE 10 MG/1
10 TABLET ORAL DAILY
Status: DISCONTINUED | OUTPATIENT
Start: 2024-05-02 | End: 2024-05-03

## 2024-05-02 RX ORDER — SODIUM CHLORIDE 9 MG/ML
INJECTION, SOLUTION INTRAVENOUS PRN
Status: DISCONTINUED | OUTPATIENT
Start: 2024-05-02 | End: 2024-05-09 | Stop reason: HOSPADM

## 2024-05-02 RX ORDER — FENTANYL CITRATE 50 UG/ML
INJECTION, SOLUTION INTRAMUSCULAR; INTRAVENOUS PRN
Status: COMPLETED | OUTPATIENT
Start: 2024-05-02 | End: 2024-05-02

## 2024-05-02 RX ORDER — ATORVASTATIN CALCIUM 40 MG/1
40 TABLET, FILM COATED ORAL NIGHTLY
Status: DISCONTINUED | OUTPATIENT
Start: 2024-05-02 | End: 2024-05-09 | Stop reason: HOSPADM

## 2024-05-02 RX ORDER — HYDRALAZINE HYDROCHLORIDE 50 MG/1
100 TABLET, FILM COATED ORAL EVERY 8 HOURS SCHEDULED
Status: DISCONTINUED | OUTPATIENT
Start: 2024-05-02 | End: 2024-05-09 | Stop reason: HOSPADM

## 2024-05-02 RX ORDER — ISOSORBIDE DINITRATE 20 MG/1
40 TABLET ORAL 3 TIMES DAILY
Status: DISCONTINUED | OUTPATIENT
Start: 2024-05-02 | End: 2024-05-09 | Stop reason: HOSPADM

## 2024-05-02 RX ORDER — HEPARIN SODIUM 1000 [USP'U]/ML
INJECTION, SOLUTION INTRAVENOUS; SUBCUTANEOUS PRN
Status: COMPLETED | OUTPATIENT
Start: 2024-05-02 | End: 2024-05-02

## 2024-05-02 RX ORDER — HEPARIN SODIUM 1000 [USP'U]/ML
3800 INJECTION, SOLUTION INTRAVENOUS; SUBCUTANEOUS PRN
Status: DISCONTINUED | OUTPATIENT
Start: 2024-05-02 | End: 2024-05-09 | Stop reason: HOSPADM

## 2024-05-02 RX ORDER — MIDAZOLAM HYDROCHLORIDE 2 MG/2ML
INJECTION, SOLUTION INTRAMUSCULAR; INTRAVENOUS PRN
Status: COMPLETED | OUTPATIENT
Start: 2024-05-02 | End: 2024-05-02

## 2024-05-02 RX ORDER — HEPARIN SODIUM 5000 [USP'U]/ML
5000 INJECTION, SOLUTION INTRAVENOUS; SUBCUTANEOUS EVERY 8 HOURS SCHEDULED
Status: DISCONTINUED | OUTPATIENT
Start: 2024-05-03 | End: 2024-05-09 | Stop reason: HOSPADM

## 2024-05-02 RX ORDER — ACETAMINOPHEN 650 MG/1
650 SUPPOSITORY RECTAL EVERY 6 HOURS PRN
Status: DISCONTINUED | OUTPATIENT
Start: 2024-05-02 | End: 2024-05-09 | Stop reason: HOSPADM

## 2024-05-02 RX ORDER — LIDOCAINE HYDROCHLORIDE AND EPINEPHRINE BITARTRATE 20; .01 MG/ML; MG/ML
INJECTION, SOLUTION SUBCUTANEOUS PRN
Status: COMPLETED | OUTPATIENT
Start: 2024-05-02 | End: 2024-05-02

## 2024-05-02 RX ORDER — ONDANSETRON 2 MG/ML
4 INJECTION INTRAMUSCULAR; INTRAVENOUS EVERY 6 HOURS PRN
Status: DISCONTINUED | OUTPATIENT
Start: 2024-05-02 | End: 2024-05-09 | Stop reason: HOSPADM

## 2024-05-02 RX ORDER — LOSARTAN POTASSIUM 25 MG/1
25 TABLET ORAL DAILY
Status: DISCONTINUED | OUTPATIENT
Start: 2024-05-03 | End: 2024-05-03

## 2024-05-02 RX ORDER — ACETAMINOPHEN 325 MG/1
650 TABLET ORAL EVERY 6 HOURS PRN
Status: DISCONTINUED | OUTPATIENT
Start: 2024-05-02 | End: 2024-05-09 | Stop reason: HOSPADM

## 2024-05-02 RX ORDER — FINASTERIDE 5 MG/1
5 TABLET, FILM COATED ORAL DAILY
Status: DISCONTINUED | OUTPATIENT
Start: 2024-05-03 | End: 2024-05-09 | Stop reason: HOSPADM

## 2024-05-02 RX ORDER — SODIUM CHLORIDE 0.9 % (FLUSH) 0.9 %
5-40 SYRINGE (ML) INJECTION PRN
Status: DISCONTINUED | OUTPATIENT
Start: 2024-05-02 | End: 2024-05-09 | Stop reason: HOSPADM

## 2024-05-02 RX ORDER — SODIUM CHLORIDE 0.9 % (FLUSH) 0.9 %
5-40 SYRINGE (ML) INJECTION EVERY 12 HOURS SCHEDULED
Status: DISCONTINUED | OUTPATIENT
Start: 2024-05-02 | End: 2024-05-09 | Stop reason: HOSPADM

## 2024-05-02 RX ORDER — ONDANSETRON 4 MG/1
4 TABLET, ORALLY DISINTEGRATING ORAL EVERY 8 HOURS PRN
Status: DISCONTINUED | OUTPATIENT
Start: 2024-05-02 | End: 2024-05-09 | Stop reason: HOSPADM

## 2024-05-02 RX ORDER — POLYETHYLENE GLYCOL 3350 17 G/17G
17 POWDER, FOR SOLUTION ORAL DAILY PRN
Status: DISCONTINUED | OUTPATIENT
Start: 2024-05-02 | End: 2024-05-09 | Stop reason: HOSPADM

## 2024-05-02 RX ADMIN — ISOSORBIDE DINITRATE 40 MG: 20 TABLET ORAL at 20:42

## 2024-05-02 RX ADMIN — AMLODIPINE BESYLATE 10 MG: 10 TABLET ORAL at 18:52

## 2024-05-02 RX ADMIN — SODIUM CHLORIDE, PRESERVATIVE FREE 10 ML: 5 INJECTION INTRAVENOUS at 20:55

## 2024-05-02 RX ADMIN — HEPARIN SODIUM 4000 UNITS: 1000 INJECTION INTRAVENOUS; SUBCUTANEOUS at 13:24

## 2024-05-02 RX ADMIN — FENTANYL CITRATE 25 MCG: 50 INJECTION INTRAMUSCULAR; INTRAVENOUS at 13:08

## 2024-05-02 RX ADMIN — MIDAZOLAM HYDROCHLORIDE 0.5 MG: 1 INJECTION, SOLUTION INTRAMUSCULAR; INTRAVENOUS at 13:08

## 2024-05-02 RX ADMIN — HYDRALAZINE HYDROCHLORIDE 100 MG: 50 TABLET ORAL at 18:51

## 2024-05-02 RX ADMIN — CEFAZOLIN 2000 MG: 1 INJECTION, POWDER, FOR SOLUTION INTRAMUSCULAR; INTRAVENOUS at 13:18

## 2024-05-02 RX ADMIN — LIDOCAINE HYDROCHLORIDE,EPINEPHRINE BITARTRATE 10 ML: 20; .01 INJECTION, SOLUTION INFILTRATION; PERINEURAL at 13:10

## 2024-05-02 RX ADMIN — LIDOCAINE HYDROCHLORIDE,EPINEPHRINE BITARTRATE 5 ML: 20; .01 INJECTION, SOLUTION INFILTRATION; PERINEURAL at 13:23

## 2024-05-02 RX ADMIN — ATORVASTATIN CALCIUM 40 MG: 40 TABLET, FILM COATED ORAL at 20:43

## 2024-05-02 RX ADMIN — HYDRALAZINE HYDROCHLORIDE 100 MG: 50 TABLET ORAL at 20:43

## 2024-05-02 ASSESSMENT — PAIN SCALES - GENERAL
PAINLEVEL_OUTOF10: 0

## 2024-05-02 ASSESSMENT — PAIN - FUNCTIONAL ASSESSMENT: PAIN_FUNCTIONAL_ASSESSMENT: NONE - DENIES PAIN

## 2024-05-02 NOTE — H&P
History & Physical    Patient: Amish Shetty MRN: [id number]  [facility]: [id number]    YOB: 1955  Age: 68 y.o.  Sex: male      DOA: 5/2/2024    Chief Complaint   Patient presents with    Shortness of Breath          HPI:     Amish Shetty is a 68 y.o. male with past medical hx of CKD, nephrotic syndrome, renal amyloidosis, ischemic stroke with right-sided weakness, hypertension, diabetes presented to the ED with progressively worsening shortness of breath, started 2 days ago.  He has been experiencing dyspnea on exertion.  Denies associated fever sweats, chest pain.  Patient was admitted to our facility about 3 weeks ago, and underwent emergent dialysis at that time.  Patient states he saw Dr. Papito Ramírez in clinic yesterday, and was informed that the plan is for him to start hemodialysis soon..  Patient woke up this morning with significantly worsened shortness of breath, so he presented to the ED.  In the ED, creatinine 2.81, BUN 37.  BNP 16,330.  Potassium 3.2. Blood pressure noted elevated, as high as 168/103.  Chest x-ray with concern for pulmonary edema.  Patient was taken to interventional radiology, where image guided tunneled dialysis catheter was placed this afternoon.  Patient then underwent dialysis, and was transferred to 2 S. for admission.  On 2 S., patient reports he is hungry and requesting food.  Denies chest pain, fever, nausea or vomiting.  Reports he had increasing use of his home oxygen, 2 L as needed.  At home, he has a concentrator and portable tanks.  States he feels better since dialysis.  He requests frequent visits from physical therapy, as he became very weak and during recent hospital stay, stating he was here for 9 days and became deconditioned.    Patient denies personal use of tobacco, secondhand smoke exposure, alcohol, illicits.  He resides at home with his wife and sister-in-law.  He is retired from [employer], where he worked as a .  He  Collection Time: 05/02/24 10:10 AM   Result Value Ref Range    Sodium 145 136 - 145 mmol/L    Potassium 3.2 (L) 3.5 - 5.5 mmol/L    Chloride 108 100 - 111 mmol/L    CO2 29 21 - 32 mmol/L    Anion Gap 8 3.0 - 18 mmol/L    Glucose 108 (H) 74 - 99 mg/dL    BUN 37 (H) 7.0 - 18 MG/DL    Creatinine 2.81 (H) 0.6 - 1.3 MG/DL    Bun/Cre Ratio 13 12 - 20      Est, Glom Filt Rate 24 (L) >60 ml/min/1.73m2    Calcium 8.6 8.5 - 10.1 MG/DL    Total Bilirubin 0.6 0.2 - 1.0 MG/DL    ALT 27 16 - 61 U/L    AST 25 10 - 38 U/L    Alk Phosphatase 124 (H) 45 - 117 U/L    Total Protein 6.6 6.4 - 8.2 g/dL    Albumin 2.9 (L) 3.4 - 5.0 g/dL    Globulin 3.7 2.0 - 4.0 g/dL    Albumin/Globulin Ratio 0.8 0.8 - 1.7     Magnesium    Collection Time: 05/02/24 10:10 AM   Result Value Ref Range    Magnesium 2.3 1.6 - 2.6 mg/dL   Troponin    Collection Time: 05/02/24 10:10 AM   Result Value Ref Range    Troponin, High Sensitivity 32 0 - 78 ng/L   Brain Natriuretic Peptide    Collection Time: 05/02/24 10:10 AM   Result Value Ref Range    NT Pro-BNP 16,330 (H) 0 - 900 PG/ML   Protime-INR    Collection Time: 05/02/24 10:10 AM   Result Value Ref Range    Protime 15.6 (H) 11.9 - 14.7 sec    INR 1.2 (H) 0.9 - 1.1     Urinalysis    Collection Time: 05/02/24 11:31 AM   Result Value Ref Range    Color, UA YELLOW      Appearance CLEAR      Specific Gravity, UA 1.016 1.005 - 1.030      pH, Urine 7.5 5.0 - 8.0      Protein, UA >1000 (A) NEG mg/dL    Glucose, Ur Negative NEG mg/dL    Ketones, Urine Negative NEG mg/dL    Bilirubin, Urine Negative NEG      Blood, Urine Negative NEG      Urobilinogen, Urine 0.2 0.2 - 1.0 EU/dL    Nitrite, Urine Negative NEG      Leukocyte Esterase, Urine Negative NEG     Urinalysis, Micro    Collection Time: 05/02/24 11:31 AM   Result Value Ref Range    WBC, UA NEGATIVE 0 - 4 /hpf    RBC, UA NEGATIVE 0 - 5 /hpf    Epithelial Cells UA NEGATIVE 0 - 5 /lpf    BACTERIA, URINE Negative NEG /hpf    Yeast, UA NEGATIVE NEG

## 2024-05-02 NOTE — ED NOTES
Pt placed in gown and in position of most comfort with call light in reach. Bed in lowest position with rails up for safety. Pt placed in condom cath per request due to mobility, provider made aware, plan of care ongoing

## 2024-05-02 NOTE — CONSULTS
Interventional Radiology Consult Note  Patient: Amish Shetty               Sex: male          DOA: 5/2/2024       YOB: 1955      Age:  68 y.o.        LOS:  LOS: 0 days              Assessment   CHF  CKD requiring hemodialysis   History of CVA  Nephrotic syndrome  Anemia     TDC placement is indicated to aid in diagnostics and guide further management.    Case and images reviewed by Dr. Dale .    Plan     Image guided tunneled dialysis catheter placement with moderate sedation 5/2/24 as schedule allows    - Pt has been NPO today  - Labs reviewed -- okay to proceed     Thank you,  NARINDER Jacob  1353    HPI:     Consult for evaluation of CKD and CHF received from NARINDER Nunez and reviewed with Dr. Dale.    Amish Shetty is a 68 y.o. male with a PMH of CVA, CKD stage IV, nephrotic syndrome, and CHF who presented to [facility] on 5/2/24 for shortness of breath.  ED evaluation was significant for wbc 8.5, hemoglobin 7.5, plt 287, Cr 2.81, K 3.2, and INR 1.2. chest xray concerning for fluid overload. Patient was admitted for further evaluation and management.  Patient takes 81 mg aspirin daily. Today the patient reports shortness of breath at rest and with exertion. Associated symptoms include bilateral upper and lower extremity edema. Denies fever, chills, chest pain, abdominal pain, nausea, or vomiting.     The anticipated procedure was discussed in detail including risk of injury, infection, and bleeding. All questions were answered and concerns addressed. Informed consents were obtained.    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

## 2024-05-02 NOTE — PROCEDURES
RADIOLOGY POST PROCEDURE NOTE     May 2, 2024       1:30 PM     Preoperative Diagnosis:   acute on chronic renal failure requiring hemodialysis     Postoperative Diagnosis:  Same.    :  NARINDER Jacob    Assistant:  None.    Type of Anesthesia: 1% lidocaine with epinephrine, fentanyl and versed    Procedure/Description:  Image-guided tunneled dialysis catheter placement     Findings:   successful 14.5 Fr 23 cm tunneled dialysis catheter placement. Images saved. Patient tolerated procedure well. No complications     Estimated blood Loss:  Minimal    Specimen Removed:  none    Blood transfusions:  None.    Implants:  None.    Complications: None    Condition: Stable    Blood thinning medications: OK to resume as clinically indicated    Discharge Plan:  continue present therapy    NARINDER Jacob

## 2024-05-02 NOTE — ED PROVIDER NOTES
EMERGENCY DEPARTMENT HISTORY AND PHYSICAL EXAM    5:08 PM      Date: 5/2/2024  Patient Name: Amish Shetty    History of Presenting Illness     Chief Complaint   Patient presents with    Shortness of Breath         History Provided By: the patient.     Additional History (Context): Amish Shetty is a 68 y.o. male with a history of ischemic stroke, CKD stage IV, nephrotic syndrome, renal amyloidosis, and CHF presenting to the emergency department due to shortness of breath.  Patient reports that he was seen in our facility approximately 3 weeks ago and required emergent dialysis.  States that he has been feeling well, but over the last few days he has noticed increasing shortness of breath.  States that he is only supposed to be on 2 L as needed but he has been using it constantly.  States that any movement makes him very short of breath and he feels like his entire body is swollen.  Admits to weight gain.  States that he followed with his nephrologist Dr. Ramírez yesterday and they are currently planning on starting him on dialysis for fluid management.  Patient states that he was instructed to come to the ED if shortness of breath continues or worsens.  Patient states that he woke up this morning feeling significantly worse.  Denies any chest pain.  Denies fever or chills.      PCP: Beth Cheema, NP-C    Current Facility-Administered Medications   Medication Dose Route Frequency Provider Last Rate Last Admin    amLODIPine (NORVASC) tablet 10 mg  10 mg Oral Daily Cristin Hernandez MD        atorvastatin (LIPITOR) tablet 40 mg  40 mg Oral Nightly Cristin Hernandez MD        [START ON 5/3/2024] finasteride (PROSCAR) tablet 5 mg  5 mg Oral Daily Cristin Hernandez MD        hydrALAZINE (APRESOLINE) tablet 100 mg  100 mg Oral 3 times per day Cristin Hernandez MD        isosorbide dinitrate (ISORDIL) tablet 40 mg  40 mg Oral TID Cristin Hernandez MD        [START ON 5/3/2024] sertraline (ZOLOFT) tablet 50 mg  50 mg Oral Daily David  States that the patient is very swollen and will likely require dialysis.  Does not currently have access.  States that he will likely require admission and vascular consult. Reports that Dr. Bray is also aware of the patient and recommends sending him a message.  [NG]   1149 Patient seen and evaluated by vascular PA Milton.  States that he reached out to [facility] and they are planning for TDC placement today.  This was relayed to nephrology Dr. Bray.  [NG]   1253 Patient was consented for TDC by RO [facility]. [NG]      ED Course User Index  [NG] Vielka Nunez PA-C       For Hospitalized Patients:    1. Hospitalization Decision Time:  The decision to hospitalize the patient was made by Dr. Hernandez at 1245 on 5/2/2024    Diagnosis     Clinical Impression:   1. Edema, unspecified type    2. Stage 4 chronic kidney disease (HCC)    3. Elevated brain natriuretic peptide (BNP) level        Disposition: Admit     No follow-up provider specified.        Medication List        CONTINUE taking these medications      Lancets Misc            ASK your doctor about these medications      amLODIPine 10 MG tablet  Commonly known as: NORVASC     aspirin 81 MG chewable tablet     atorvastatin 40 MG tablet  Commonly known as: LIPITOR  Take 1 tablet by mouth daily     ferrous sulfate 325 (65 Fe) MG tablet  Commonly known as: IRON 325     finasteride 5 MG tablet  Commonly known as: PROSCAR  Take 1 tablet by mouth daily     fluticasone 50 MCG/ACT nasal spray  Commonly known as: FLONASE     furosemide 40 MG tablet  Commonly known as: Lasix  Take 1 tablet by mouth 2 times daily     hydrALAZINE 100 MG tablet  Commonly known as: APRESOLINE  Take 1 tablet by mouth every 8 hours     isosorbide dinitrate 40 MG tablet  Commonly known as: ISORDIL  Take 1 tablet by mouth 3 times daily     losartan 25 MG tablet  Commonly known as: COZAAR  Take 1 tablet by mouth daily     MULTIVITAMIN PO     OXYGEN     sertraline 50 MG tablet  Commonly

## 2024-05-02 NOTE — DIALYSIS
HD Care plan  Time: 3 hrs  Dialysate: 3 K+   2.5 Ca++  Bath  Net UF: 3 L  Access: Aseptic care for RT Chest TDC  Hemodynamic stability: Maintain BP WNL     Pre Dialysis:  Patient received from Angio RN, pt to proceed to 2 S after dialysis . Patient on a stretcher, A+O x 4, on oxygen 2L via NC, SPO2 97%, no s/s of acute distress noted but has dyspnea on exertion. RT Chest TDC  assessed, no abnormalities noted. TDC accessed per protocol without any difficulty, line patent with good flow.     Intra Dialysis:  Time out / safety process performed per policy. Tx initiated at 1430.    TDC flowing with ease. For hemodynamic stability UF goal set at 3000 ml as tolerated.  Pt offered assistance with repositioning every 2 hours/prn    Vascular access visible and line connections remained intact throughout entire duration of treatment.   Vital signs checked every 15 mins.     Post Dialysis:  Tx completed at 1730,   Tolerated well, 3 L  removed.  De-accessed per protocol.    Dialysis catheter locked accordingly with Heparin 1.9 ml in arterial port, and 1.9 ml in venous port. Catheter dressing clean, dry and intact.  Post Dialysis report given to Sharlene Fragoso RN

## 2024-05-02 NOTE — CONSULTS
Consult Note  Consult requested by:   Amish Shetty is a 68 y.o. male White (non-) who is being seen on consult for renal failure  Chief Complaint   Patient presents with    Shortness of Breath     Admission diagnosis: Acute pulmonary edema (HCC) [J81.0]      HPI:68 y o white male with hx of crf.nephrotic syndrome ,kidney biopsy showed alect2 amyloidosis,admitted with worsening sob despite diuretics,he saw dr danielle clifton in the office and decided to start dialysis ,hx of dm,htn  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     On Allopurinol    Chronic hypokalemia     Persistent chronic hypokalemia + hypertension; ?primary hyperaldosteronism    CKD (chronic kidney disease)     Hypertension     Nephrotic syndrome     Obesity, Class I, BMI 30-34.9     Obstructive sleep apnea on CPAP     Pure hypercholesterolemia     Received intravenous tissue plasminogen activator (tPA) in emergency department 8/12/2019    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HCC)     ALECT2    Secondary hyperparathyroidism of renal origin (HCC) 8/18/2019    PTH (8/18/2019) = 101.7    Type 2 diabetes mellitus with stage 2 chronic kidney disease (HCC)     HbA1c (8/13/2019) = 6.6 no meds    Vitamin B12 deficiency anemia 8/14/2019    Vitamin B12 (8/14/2019) = 208    Vitamin D deficiency 8/19/2019    Vitamin D 25-Hydroxy (8/19/2019) = 16.2       Past Surgical History:   Procedure Laterality Date    COLONOSCOPY N/A 4/15/2019    COLONOSCOPY performed by Héctor Piedra MD at [facility] ENDOSCOPY    COLONOSCOPY N/A 12/20/2021    COLONOSCOPY with polypectomies performed by Héctor Piedra MD at [facility] ENDOSCOPY    CT BIOPSY RENAL  11/2/2023    CT BIOPSY RENAL 11/2/2023 [facility] RAD CT    TONSILLECTOMY      UPPER GASTROINTESTINAL  tablet 50 mg  50 mg Oral Daily    sodium chloride flush 0.9 % injection 5-40 mL  5-40 mL IntraVENous 2 times per day    sodium chloride flush 0.9 % injection 5-40 mL  5-40 mL IntraVENous PRN    0.9 % sodium chloride infusion   IntraVENous PRN    ondansetron (ZOFRAN-ODT) disintegrating tablet 4 mg  4 mg Oral Q8H PRN    Or    ondansetron (ZOFRAN) injection 4 mg  4 mg IntraVENous Q6H PRN    polyethylene glycol (GLYCOLAX) packet 17 g  17 g Oral Daily PRN    acetaminophen (TYLENOL) tablet 650 mg  650 mg Oral Q6H PRN    Or    acetaminophen (TYLENOL) suppository 650 mg  650 mg Rectal Q6H PRN    [START ON 5/3/2024] heparin (porcine) injection 5,000 Units  5,000 Units SubCUTAneous 3 times per day    heparin (porcine) injection 3,800 Units  3,800 Units IntraCATHeter PRN     Current Outpatient Medications   Medication Sig    isosorbide dinitrate (ISORDIL) 40 MG tablet Take 1 tablet by mouth 3 times daily    hydrALAZINE (APRESOLINE) 100 MG tablet Take 1 tablet by mouth every 8 hours    losartan (COZAAR) 25 MG tablet Take 1 tablet by mouth daily    fluticasone (FLONASE) 50 MCG/ACT nasal spray 2 sprays by Each Nostril route daily    OXYGEN Inhale 2 L/min into the lungs as needed for Shortness of Breath.    furosemide (LASIX) 40 MG tablet Take 1 tablet by mouth 2 times daily    atorvastatin (LIPITOR) 40 MG tablet Take 1 tablet by mouth daily    finasteride (PROSCAR) 5 MG tablet Take 1 tablet by mouth daily    Lancets MISC Prick finger with lancet once a day in AM before breakfast or PRN to monitor blood sugar (E11.21)    Multiple Vitamin (MULTIVITAMIN PO) Take 1 tablet by mouth daily    amLODIPine (NORVASC) 10 MG tablet Take 1 tablet by mouth daily    aspirin 81 MG chewable tablet Take 1 tablet by mouth daily (with breakfast)    ferrous sulfate (IRON 325) 325 (65 Fe) MG tablet Take 1 tablet by mouth daily (with breakfast)    sertraline (ZOLOFT) 50 MG tablet ceived the following from [facility]: Outside name:

## 2024-05-03 LAB
ANION GAP SERPL CALC-SCNC: 5 MMOL/L (ref 3–18)
BASOPHILS # BLD: 0.1 K/UL (ref 0–0.1)
BASOPHILS NFR BLD: 1 % (ref 0–2)
BUN SERPL-MCNC: 23 MG/DL (ref 7–18)
BUN/CREAT SERPL: 11 (ref 12–20)
CALCIUM SERPL-MCNC: 8.3 MG/DL (ref 8.5–10.1)
CHLORIDE SERPL-SCNC: 109 MMOL/L (ref 100–111)
CO2 SERPL-SCNC: 29 MMOL/L (ref 21–32)
CREAT SERPL-MCNC: 2.01 MG/DL (ref 0.6–1.3)
DIFFERENTIAL METHOD BLD: ABNORMAL
EOSINOPHIL # BLD: 0.3 K/UL (ref 0–0.4)
EOSINOPHIL NFR BLD: 4 % (ref 0–5)
ERYTHROCYTE [DISTWIDTH] IN BLOOD BY AUTOMATED COUNT: 18.1 % (ref 11.6–14.5)
GLUCOSE SERPL-MCNC: 94 MG/DL (ref 74–99)
HCT VFR BLD AUTO: 24.7 % (ref 36–48)
HGB BLD-MCNC: 7.5 G/DL (ref 13–16)
IMM GRANULOCYTES # BLD AUTO: 0 K/UL (ref 0–0.04)
IMM GRANULOCYTES NFR BLD AUTO: 0 % (ref 0–0.5)
LYMPHOCYTES # BLD: 0.9 K/UL (ref 0.9–3.6)
LYMPHOCYTES NFR BLD: 12 % (ref 21–52)
MAGNESIUM SERPL-MCNC: 2.2 MG/DL (ref 1.6–2.6)
MCH RBC QN AUTO: 26.5 PG (ref 24–34)
MCHC RBC AUTO-ENTMCNC: 30.4 G/DL (ref 31–37)
MCV RBC AUTO: 87.3 FL (ref 78–100)
MONOCYTES # BLD: 0.9 K/UL (ref 0.05–1.2)
MONOCYTES NFR BLD: 11 % (ref 3–10)
NEUTS SEG # BLD: 5.6 K/UL (ref 1.8–8)
NEUTS SEG NFR BLD: 71 % (ref 40–73)
NRBC # BLD: 0 K/UL (ref 0–0.01)
NRBC BLD-RTO: 0 PER 100 WBC
PHOSPHATE SERPL-MCNC: 2.8 MG/DL (ref 2.5–4.9)
PLATELET # BLD AUTO: 289 K/UL (ref 135–420)
PMV BLD AUTO: 9.6 FL (ref 9.2–11.8)
POTASSIUM SERPL-SCNC: 3.2 MMOL/L (ref 3.5–5.5)
RBC # BLD AUTO: 2.83 M/UL (ref 4.35–5.65)
SODIUM SERPL-SCNC: 143 MMOL/L (ref 136–145)
WBC # BLD AUTO: 7.8 K/UL (ref 4.6–13.2)

## 2024-05-03 PROCEDURE — 97530 THERAPEUTIC ACTIVITIES: CPT

## 2024-05-03 PROCEDURE — 97162 PT EVAL MOD COMPLEX 30 MIN: CPT

## 2024-05-03 PROCEDURE — 1100000003 HC PRIVATE W/ TELEMETRY

## 2024-05-03 PROCEDURE — 90935 HEMODIALYSIS ONE EVALUATION: CPT

## 2024-05-03 PROCEDURE — 6370000000 HC RX 637 (ALT 250 FOR IP): Performed by: FAMILY MEDICINE

## 2024-05-03 PROCEDURE — 97535 SELF CARE MNGMENT TRAINING: CPT

## 2024-05-03 PROCEDURE — 84100 ASSAY OF PHOSPHORUS: CPT

## 2024-05-03 PROCEDURE — 6360000002 HC RX W HCPCS: Performed by: INTERNAL MEDICINE

## 2024-05-03 PROCEDURE — 2580000003 HC RX 258: Performed by: HOSPITALIST

## 2024-05-03 PROCEDURE — 97166 OT EVAL MOD COMPLEX 45 MIN: CPT

## 2024-05-03 PROCEDURE — 6360000002 HC RX W HCPCS: Performed by: HOSPITALIST

## 2024-05-03 PROCEDURE — 80048 BASIC METABOLIC PNL TOTAL CA: CPT

## 2024-05-03 PROCEDURE — 36415 COLL VENOUS BLD VENIPUNCTURE: CPT

## 2024-05-03 PROCEDURE — 6370000000 HC RX 637 (ALT 250 FOR IP): Performed by: HOSPITALIST

## 2024-05-03 PROCEDURE — 2700000000 HC OXYGEN THERAPY PER DAY

## 2024-05-03 PROCEDURE — 94761 N-INVAS EAR/PLS OXIMETRY MLT: CPT

## 2024-05-03 PROCEDURE — 83735 ASSAY OF MAGNESIUM: CPT

## 2024-05-03 PROCEDURE — 85025 COMPLETE CBC W/AUTO DIFF WBC: CPT

## 2024-05-03 PROCEDURE — 99232 SBSQ HOSP IP/OBS MODERATE 35: CPT | Performed by: FAMILY MEDICINE

## 2024-05-03 RX ORDER — BUMETANIDE 0.25 MG/ML
2 INJECTION INTRAMUSCULAR; INTRAVENOUS 2 TIMES DAILY
Status: DISCONTINUED | OUTPATIENT
Start: 2024-05-03 | End: 2024-05-04

## 2024-05-03 RX ORDER — LOSARTAN POTASSIUM 50 MG/1
100 TABLET ORAL DAILY
Status: DISCONTINUED | OUTPATIENT
Start: 2024-05-03 | End: 2024-05-09 | Stop reason: HOSPADM

## 2024-05-03 RX ADMIN — FINASTERIDE 5 MG: 5 TABLET, FILM COATED ORAL at 09:41

## 2024-05-03 RX ADMIN — HEPARIN SODIUM 5000 UNITS: 5000 INJECTION INTRAVENOUS; SUBCUTANEOUS at 22:30

## 2024-05-03 RX ADMIN — ISOSORBIDE DINITRATE 40 MG: 20 TABLET ORAL at 22:30

## 2024-05-03 RX ADMIN — HYDRALAZINE HYDROCHLORIDE 100 MG: 50 TABLET ORAL at 22:29

## 2024-05-03 RX ADMIN — SERTRALINE 50 MG: 50 TABLET, FILM COATED ORAL at 09:41

## 2024-05-03 RX ADMIN — IRON SUCROSE 200 MG: 20 INJECTION, SOLUTION INTRAVENOUS at 12:48

## 2024-05-03 RX ADMIN — POTASSIUM BICARBONATE 50 MEQ: 978 TABLET, EFFERVESCENT ORAL at 12:49

## 2024-05-03 RX ADMIN — ATORVASTATIN CALCIUM 40 MG: 40 TABLET, FILM COATED ORAL at 22:29

## 2024-05-03 RX ADMIN — EPOETIN ALFA-EPBX 10000 UNITS: 10000 INJECTION, SOLUTION INTRAVENOUS; SUBCUTANEOUS at 17:47

## 2024-05-03 RX ADMIN — POTASSIUM BICARBONATE 50 MEQ: 978 TABLET, EFFERVESCENT ORAL at 17:47

## 2024-05-03 RX ADMIN — BUMETANIDE 2 MG: 0.25 INJECTION INTRAMUSCULAR; INTRAVENOUS at 17:47

## 2024-05-03 RX ADMIN — SODIUM CHLORIDE, PRESERVATIVE FREE 5 ML: 5 INJECTION INTRAVENOUS at 22:00

## 2024-05-03 RX ADMIN — SODIUM CHLORIDE, PRESERVATIVE FREE 10 ML: 5 INJECTION INTRAVENOUS at 09:41

## 2024-05-03 ASSESSMENT — PAIN SCALES - GENERAL
PAINLEVEL_OUTOF10: 0
PAINLEVEL_OUTOF10: 0

## 2024-05-03 NOTE — WOUND CARE
Room 211: Focused wound assess    Sacrum, stage 1 pressure injury, nonblanchable erythema with intact skin,   silicone heart dressing in place.  Both heels are intact with silicone heel dressings placed, heel prevention boots applied  Bilaterally. Skin care per unit protocol for prevention.   Encouraged pt to shift his hips throughout the day to reduce bedsores & pt was amenable   To the education & was appreciative.  Unit nursing staff to turn & reposition q2hrs, use left & right sides, on back only for meals or treatments. Use turning wedges.  Float heels off bed daily with one pillow under each leg lengthwise or use heel boots.   Will turn over care to unit nursing staff at this time & sign off.   Tressa VALDEZN, RN, CWOCN, CFCN

## 2024-05-03 NOTE — PROGRESS NOTES
[facility] Hospitalist Group  Progress Note    Patient: Amish Shetty Age: 68 y.o. : 1955 MR#: [id number] SSN: [id number]      Subjective/24-hour events:     Resting comfortably, no new complaints.  Still has a fair amount of swelling but no acute shortness of breath or chest pain reported.  Afebrile.    Assessment:   CKD 5, secondary to amyloidosis by biopsy, now ESRD  Volume overload  Hypokalemia   Hypertension  Dyslipidemia  Persistent atrial fibrillation/SSS, not on OAC therapy  Hemoccult positive stool  Chronic anemia, likely multifactorial - chronic disease/iron deficiency  Chronic hypoxic respiratory failure on home oxygen  CHIOMA on home CPAP  Class I obesity  History of CVA with residual deficit - R hemiparesis    Plan:   Hemodialysis initiated yesterday.  Anticipate additional session today and possibly tomorrow.  Bumex continued for diuresis.  Outpatient hemodialysis in process of being arranged.  Discussed with Dr. Rowland - assistance appreciated.  Monitor hemoglobin.  On Venofer currently.  Monitor BPs.  Hydralazine added PRN with parameters.  Replace potassium orally.  Monitor.  PT/OT.  Supportive care otherwise.  Follow.    Case discussed with:  [x]Patient  []Family  [x] Nursing  [x]Case Management  DVT Prophylaxis:  []Lovenox  [x]Hep SQ  []SCDs  []Coumadin   []On Heparin gtt []PO anticoagulant    Objective:   VS: BP (!) 175/74   Pulse (!) 44   Temp 97.6 °F (36.4 °C) (Axillary)   Resp 20   Ht 1.753 m (5' 9\")   Wt 102.1 kg (225 lb)   SpO2 92%   BMI 33.23 kg/m²      Tmax/24hrs: Temp (24hrs), Av.4 °F (36.9 °C), Min:97.6 °F (36.4 °C), Max:99 °F (37.2 °C)  No intake or output data in the 24 hours ending 24 0847    Gen:  In NAD.  Lungs: Clear, no wheezes  Effort nonlabored.  CV: RRR.  Abdomen: Soft, NTTP.  Extremities: Warm, no pitting edema or ischemia.  Neuro:  Awake and alert, moves extremities spontaneously.    Current Facility-Administered Medications  15.6 (H) 11.9 - 14.7 sec    INR 1.2 (H) 0.9 - 1.1     Urinalysis    Collection Time: 05/02/24 11:31 AM   Result Value Ref Range    Color, UA YELLOW      Appearance CLEAR      Specific Gravity, UA 1.016 1.005 - 1.030      pH, Urine 7.5 5.0 - 8.0      Protein, UA >1000 (A) NEG mg/dL    Glucose, Ur Negative NEG mg/dL    Ketones, Urine Negative NEG mg/dL    Bilirubin, Urine Negative NEG      Blood, Urine Negative NEG      Urobilinogen, Urine 0.2 0.2 - 1.0 EU/dL    Nitrite, Urine Negative NEG      Leukocyte Esterase, Urine Negative NEG     Urinalysis, Micro    Collection Time: 05/02/24 11:31 AM   Result Value Ref Range    WBC, UA NEGATIVE 0 - 4 /hpf    RBC, UA NEGATIVE 0 - 5 /hpf    Epithelial Cells UA NEGATIVE 0 - 5 /lpf    BACTERIA, URINE Negative NEG /hpf    Yeast, UA NEGATIVE NEG   Phosphorus    Collection Time: 05/03/24  4:00 AM   Result Value Ref Range    Phosphorus 2.8 2.5 - 4.9 MG/DL   CBC with Auto Differential    Collection Time: 05/03/24  4:00 AM   Result Value Ref Range    WBC 7.8 4.6 - 13.2 K/uL    RBC 2.83 (L) 4.35 - 5.65 M/uL    Hemoglobin 7.5 (L) 13.0 - 16.0 g/dL    Hematocrit 24.7 (L) 36.0 - 48.0 %    MCV 87.3 78.0 - 100.0 FL    MCH 26.5 24.0 - 34.0 PG    MCHC 30.4 (L) 31.0 - 37.0 g/dL    RDW 18.1 (H) 11.6 - 14.5 %    Platelets 289 135 - 420 K/uL    MPV 9.6 9.2 - 11.8 FL    Nucleated RBCs 0.0 0  WBC    nRBC 0.00 0.00 - 0.01 K/uL    Neutrophils % 71 40 - 73 %    Lymphocytes % 12 (L) 21 - 52 %    Monocytes % 11 (H) 3 - 10 %    Eosinophils % 4 0 - 5 %    Basophils % 1 0 - 2 %    Immature Granulocytes % 0 0.0 - 0.5 %    Neutrophils Absolute 5.6 1.8 - 8.0 K/UL    Lymphocytes Absolute 0.9 0.9 - 3.6 K/UL    Monocytes Absolute 0.9 0.05 - 1.2 K/UL    Eosinophils Absolute 0.3 0.0 - 0.4 K/UL    Basophils Absolute 0.1 0.0 - 0.1 K/UL    Immature Granulocytes Absolute 0.0 0.00 - 0.04 K/UL    Differential Type AUTOMATED     Basic Metabolic Panel    Collection Time: 05/03/24  4:00 AM   Result Value Ref Range

## 2024-05-03 NOTE — PROGRESS NOTES
HD Care plan  Time: 3 hrs  Dialysate: 3K+   2.5Ca++  Bath  Net UF: 3L  Access: Aseptic care for Right TDC  Hemodynamic stability: Maintain BP WNL     Pre Dialysis:  Patient received from ERIN Almendarez RN . Patient arrived on a bed, A+O x 4, on RA, no s/s of acute distress noted. Right TDC assessed, noted large amount of gauze taped over access dressing, patient stated floor nurse placed gauze there due to blood that was leaking out from under dressing, gauze and dressing removed, dried blood and large clot noted around catheter insertion site, dried blood cleaned with chlorhexidine wipes and clot left in place, new dressing placed aseptically, Dr. Rowland made aware. TDC accessed per protocol without any difficulty, line patent with good flow.     Intra Dialysis:  Time out / safety process performed per policy. Tx initiated at 1350.    TDC flowing with ease. For hemodynamic stability UF goal set at 3000 ml as tolerated.  Pt offered assistance with repositioning every 2 hours/prn    Vascular access visible and line connections remained intact throughout entire duration of treatment.   Vital signs checked every 15 mins.     Post Dialysis:  Tx completed at 1650,   Tolerated well, 3 L  removed.  De-accessed per protocol.    Dialysis catheter locked accordingly with Heparin 1.9 ml in arterial port, and 1.9 ml in venous port. Catheter dressing clean, dry and intact.  Post Dialysis report given to ERIN Almendarez RN

## 2024-05-03 NOTE — PLAN OF CARE
Problem: Physical Therapy - Adult  Goal: By Discharge: Performs mobility at highest level of function for planned discharge setting.  See evaluation for individualized goals.  Description: Physical Therapy Goals  Initiated 5/3/2024 and to be accomplished within 7 day(s)  1.  Patient will move from supine to sit and sit to supine in bed with modified independence.    2.  Patient will transfer from bed to chair and chair to bed with modified independence using the least restrictive device.  3.  Patient will perform sit to stand with modified independence.  4.  Patient will ambulate with modified independence for 150 feet with the least restrictive device.   5.  Patient will ascend/descend 2 stairs with handrail(s) with supervision/set-up.    PLOF: Lives with spouse and sister in law. One story with 2 steps to enter. Amb with quad cane.   Outcome: Progressing   PHYSICAL THERAPY EVALUATION    Patient: Amish Shetty (68 y.o. male)  Date: 5/3/2024  Primary Diagnosis: Acute pulmonary edema (HCC) [J81.0]  Elevated brain natriuretic peptide (BNP) level [R79.89]  Edema, unspecified type [R60.9]  Stage 4 chronic kidney disease (HCC) [N18.4]  Precautions: Fall Risk  ASSESSMENT :  Supervision for supine to sit. Seated EOB with good balance. Close guarding for sit to stand transfers. Min A for transfer to bedside commode. Decreased safety awareness. Returned to supine in bed. Seated in bed with HOB elevated. RUE elevated on pillow. Educated on need for RN assistance with mobility; verbalized understanding. Call bell in reach.     DEFICITS/IMPAIRMENTS:   Body Structures, Functions, Activity Limitations Requiring Skilled Therapeutic Intervention: Decreased functional mobility ;Decreased safe awareness;Decreased cognition;Decreased balance;Decreased strength;Decreased endurance    Patient will benefit from skilled intervention to address the above impairments.  Patient's rehabilitation potential: Therapy Prognosis: Good.  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     On Allopurinol    Chronic hypokalemia     Persistent chronic hypokalemia + hypertension; ?primary hyperaldosteronism    CKD (chronic kidney disease)     Hypertension     Nephrotic syndrome     Obesity, Class I, BMI 30-34.9     Obstructive sleep apnea on CPAP     Pure hypercholesterolemia     Received intravenous tissue plasminogen activator (tPA) in emergency department 8/12/2019    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HCC)     ALECT2    Secondary hyperparathyroidism of renal origin (HCC) 8/18/2019    PTH (8/18/2019) = 101.7    Type 2 diabetes mellitus with stage 2 chronic kidney disease (HCC)     HbA1c (8/13/2019) = 6.6 no meds    Vitamin B12 deficiency anemia 8/14/2019    Vitamin B12 (8/14/2019) = 208    Vitamin D deficiency 8/19/2019    Vitamin D 25-Hydroxy (8/19/2019) = 16.2      Past Surgical History:   Procedure Laterality Date    COLONOSCOPY N/A 4/15/2019    COLONOSCOPY performed by Héctor Piedra MD at [facility] ENDOSCOPY    COLONOSCOPY N/A 12/20/2021    COLONOSCOPY with polypectomies performed by Héctor Piedra MD at [facility] ENDOSCOPY    CT BIOPSY RENAL  11/2/2023    CT BIOPSY RENAL 11/2/2023 [facility] RAD CT    IR INSERT TUNNELED CV CATH WO PORT < 5YRS  5/2/2024    IR INSERT TUNNELED CV CATH WO PORT < 5YRS 5/2/2024 Zakia Mccoy, PA [facility] RAD ANGIO IR    TONSILLECTOMY      UPPER GASTROINTESTINAL ENDOSCOPY N/A 4/15/2024    ESOPHAGOGASTRODUODENOSCOPY w/Cauterization of AVM w/Genii and Clip placement x2 performed by Eddie aCrrera MD at [facility] ENDOSCOPY     Home Situation:  Social/Functional History  Lives With: Spouse, Family  Type of Home: House  Home Layout: One level  Home Access: Stairs to enter with rails  Entrance Stairs - Number of Steps: 2  Home Equipment: Cane, quad    Critical Behavior:  Orientation  Orientation Level: Oriented X4    Strength (BLE):    Strength: Generally decreased, functional    Range Of Motion

## 2024-05-03 NOTE — PLAN OF CARE
Problem: Occupational Therapy - Adult  Goal: By Discharge: Performs self-care activities at highest level of function for planned discharge setting.  See evaluation for individualized goals.  Description: Occupational Therapy Goals:  Initiated 5/3/2024 to be met within 7-10 days.    1.  Patient will perform grooming with supervision/set-up while standing at the sink for > 2 min with Good balance.   2.  Patient will perform bathing with supervision/set-up.  3.  Patient will perform lower body dressing with supervision/set-up.  4.  Patient will perform toilet transfers with supervision/set-up.  5.  Patient will perform all aspects of toileting with supervision/set-up.  6.  Patient will participate in upper extremity therapeutic exercise/activities with supervision/set-up for 8 minutes to improve endurance and UB strength needed for ADLs    7.  Patient will utilize energy conservation techniques during functional activities with verbal cues.    PLOF: Pt lives with spouse, reports being Mod Ind for ADLs and functional mobility using QC.  Outcome: Progressing  OCCUPATIONAL THERAPY EVALUATION    Patient: Amish Shetty (68 y.o. male)  Date: 5/3/2024  Primary Diagnosis: Acute pulmonary edema (HCC) [J81.0]  Elevated brain natriuretic peptide (BNP) level [R79.89]  Edema, unspecified type [R60.9]  Stage 4 chronic kidney disease (HCC) [N18.4]       Precautions: Fall Risk    ASSESSMENT :    Pt is motivated to participate in OT, anxious with all functional mobility tasks, benefits from VCs for PLB and pacing to maximize safety. Min A for SPT to BSC with QC, VCs for sequencing of the task. Pt requires assist for toileting hygiene in standing due to decreased balance. RUE deficits from hx of CVA, limited AROM of R shd and no ROM in elbow, wrist, hand. Noted subluxation in R shoulder, no pain. Pt denies need for sling for support. Returned to bed at the end of the session, RUE elevated on pillow, all needs

## 2024-05-03 NOTE — PROGRESS NOTES
Advance Care Planning   Healthcare Decision Maker:    Primary Decision Maker (Active): Mary Shetty - Spouse - [phone]    Secondary Decision Maker: Avis Esparza - Child - [phone]      Today we documented Decision Maker(s) consistent with ACP documents on file.        conducted an initial consultation and Spiritual Assessment for Amish Shetty, who is a 68 y.o.,male. Patient's Primary Language is: English.   According to the patient's EMR [facility] Affiliation is: Yazidi.     The reason the Patient came to the hospital is:   Patient Active Problem List    Diagnosis Date Noted    Acute pulmonary edema (HCC) 05/02/2024    Acute decompensated heart failure (HCC) 04/12/2024    Acute blood loss anemia 04/11/2024    Acute heart failure with preserved ejection fraction (HCC) 04/08/2024    Gastrointestinal hemorrhage with melena 04/08/2024    Palliative care encounter 03/21/2024    ACP (advance care planning) 03/21/2024    Goals of care, counseling/discussion 03/21/2024    DNR (do not resuscitate) discussion 03/21/2024    Acute on chronic anemia 03/17/2024    Nephrotic syndrome 02/04/2024    Edema 02/04/2024    Skin tear of forearm without complication, sequela 02/04/2024    Benign essential HTN 02/04/2024    Pericardial effusion 02/01/2024    Acute on chronic diastolic heart failure (HCC) 02/01/2024    History of CVA (cerebrovascular accident) 02/01/2024    Acute renal failure superimposed on stage 3b chronic kidney disease (HCC) 01/30/2024    Gout 01/30/2024    Atrial fibrillation (HCC) 11/03/2023    Proteinuria 11/02/2023    Obstructive sleep apnea on CPAP     Obesity, Class I, BMI 30-34.9     Hypertensive heart and kidney disease without heart failure and with stage 2 chronic kidney disease     Hypokalemia     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 insulin ([facility])     Primary osteoarthritis of right ankle 08/22/2019

## 2024-05-03 NOTE — CARE COORDINATION
Patient lives with wife   Independent with care  Home oxygen 2l through [employer]  Therapy [facility]'s home health and patient agreeable to [facility]  Wife to transport at discharge     05/03/24 8036   Service Assessment   Patient Orientation Alert and Oriented   Cognition Alert   History Provided By Patient   Primary Caregiver Self   Support Systems None   Patient's Healthcare Decision Maker is: Named in Scanned ACP Document   PCP Verified by CM Yes   Prior Functional Level Independent in ADLs/IADLs   Current Functional Level Independent in ADLs/IADLs   Can patient return to prior living arrangement Yes   Ability to make needs known: Good   Family able to assist with home care needs: Yes   Financial Resources Medicare   Social/Functional History   Lives With Spouse   Type of Home House   Home Layout One level   Home Access Stairs to enter without rails   Entrance Stairs - Number of Steps 2   Home Equipment Grab bars;Oxygen;Cane   ADL Assistance Independent   Homemaking Assistance Independent   Homemaking Responsibilities Yes   Ambulation Assistance Independent   Transfer Assistance Independent       Danielle Vieira  Case Management

## 2024-05-03 NOTE — WOUND CARE
4 Eyes Skin Assessment     NAME:  Amish Shetty  YOB: 1955  MEDICAL RECORD NUMBER:  [id number]    The patient is being assessed for  Shift Handoff    I agree that at least one RN has performed a thorough Head to Toe Skin Assessment on the patient. ALL assessment sites listed below have been assessed.      Areas assessed by both nurses:    Head, Face, Ears, Shoulders, Back, Chest, Arms, Elbows, Hands, Sacrum. Buttock, Coccyx, Ischium, and Legs. Feet and Heels        Does the Patient have a Wound? Yes wound(s) were present on assessment. LDA wound assessment was Initiated and completed by RN       Santino Prevention initiated by RN: Yes  Wound Care Orders initiated by RN: No, ASSESSMENT DONE. SACRUM/ COCCYX REDDEN    Pressure Injury (Stage 3,4, Unstageable, DTI, NWPT, and Complex wounds) if present, place Wound referral order by RN under : Yes, STAGE 1, REDDEN    New Ostomies, if present place, Ostomy referral order under : No     Nurse 1 eSignature: Electronically signed by JEREMY CARDOSO RN on 5/3/24 at 8:35 AM EDT    **SHARE this note so that the co-signing nurse can place an eSignature**VIEWED AND SIGNED BY LUCY GONGORA    Nurse 2 eSignature: Electronically signed by JEREMY CARDOSO RN on 5/3/24 at 8:36 AM EDT

## 2024-05-03 NOTE — PLAN OF CARE
INTERVENTION:  HEMODYNAMIC STABILIZATION  MAINTAIN BP WNL WHILE ON HD.     INTERVENTION:  FLUID MANAGEMENT  WILL ATTEMPT 3000 ML TOTAL FLUID REMOVAL AS TOLERATED.     INTERVENTION:  METABOLIC/ELECTROLYTE MANAGEMENT  3.0 POTASSIUM 2.5 CALCIUM DIALYSATE USED WITH HD TODAY.     INTERVENTION:  HEMODIALYSIS ACCESS SITE MANAGEMENT  RIGHT CVC ACCESSED USING ASEPTIC TECHNIQUE.     GOAL:  SIGNS AND SYMPTOMS OF LISTED POTENTIAL PROBLEMS WILL BE ABSENT OR MANAGEABLE.     OUTCOME:  PROGRESSING.     HD PLANNED FOR 3 HOURS TODAY.      Problem: Chronic Conditions and Co-morbidities  Goal: Patient's chronic conditions and co-morbidity symptoms are monitored and maintained or improved  Outcome: Progressing

## 2024-05-03 NOTE — PROGRESS NOTES
RENAL DAILY PROGRESS NOTE              Subjective:       Complaint: breathing is better  Overnight events noted  no nausea, vomiting, chest pain    IMPRESSION:   Fluid overload in setting of CKD stage 4 and poor response to diuretics,started on dialysis.  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  Hypertension  Hypokalemia  Proteinuria  Hx diabetes  Hx CVA  Anemia,followed by hematology as OP  Hx iron deficiency   PLAN:    Daily HD for now for fluid control  Bumex 2 iv bid  Increase losartan to 100 mg po daily  Stop amlodipine  3 k bath for now  Start epogen  Give iv venofer loading  Has a chair at [facility] at 12:30 pm. He needs to come at 12:00 for the first visit.           Current Facility-Administered Medications   Medication Dose Route Frequency    losartan (COZAAR) tablet 100 mg  100 mg Oral Daily    bumetanide (BUMEX) injection 2 mg  2 mg IntraVENous BID    potassium bicarbonate (EFFER-K/K-LYTE) disintegrating tablet 50 mEq  50 mEq Oral Q6H    atorvastatin (LIPITOR) tablet 40 mg  40 mg Oral Nightly    finasteride (PROSCAR) tablet 5 mg  5 mg Oral Daily    hydrALAZINE (APRESOLINE) tablet 100 mg  100 mg Oral 3 times per day    isosorbide dinitrate (ISORDIL) tablet 40 mg  40 mg Oral TID    sertraline (ZOLOFT) tablet 50 mg  50 mg Oral Daily    sodium chloride flush 0.9 % injection 5-40 mL  5-40 mL IntraVENous 2 times per day    sodium chloride flush 0.9 % injection 5-40 mL  5-40 mL IntraVENous PRN    0.9 % sodium chloride infusion   IntraVENous PRN    ondansetron (ZOFRAN-ODT) disintegrating tablet 4 mg  4 mg Oral Q8H PRN    Or    ondansetron (ZOFRAN) injection 4 mg  4 mg IntraVENous Q6H PRN    polyethylene glycol (GLYCOLAX) packet 17 g  17 g Oral Daily PRN    acetaminophen (TYLENOL) tablet 650 mg  650 mg Oral Q6H PRN    Or    acetaminophen (TYLENOL) suppository 650 mg  650 mg Rectal Q6H PRN    heparin (porcine) injection 5,000 Units

## 2024-05-04 LAB
ANION GAP SERPL CALC-SCNC: 5 MMOL/L (ref 3–18)
BUN SERPL-MCNC: 18 MG/DL (ref 7–18)
BUN/CREAT SERPL: 9 (ref 12–20)
CALCIUM SERPL-MCNC: 8.2 MG/DL (ref 8.5–10.1)
CHLORIDE SERPL-SCNC: 108 MMOL/L (ref 100–111)
CO2 SERPL-SCNC: 30 MMOL/L (ref 21–32)
CREAT SERPL-MCNC: 2.02 MG/DL (ref 0.6–1.3)
GLUCOSE SERPL-MCNC: 91 MG/DL (ref 74–99)
POTASSIUM SERPL-SCNC: 4.4 MMOL/L (ref 3.5–5.5)
SODIUM SERPL-SCNC: 143 MMOL/L (ref 136–145)

## 2024-05-04 PROCEDURE — 80048 BASIC METABOLIC PNL TOTAL CA: CPT

## 2024-05-04 PROCEDURE — 90935 HEMODIALYSIS ONE EVALUATION: CPT

## 2024-05-04 PROCEDURE — 6370000000 HC RX 637 (ALT 250 FOR IP): Performed by: HOSPITALIST

## 2024-05-04 PROCEDURE — 1100000003 HC PRIVATE W/ TELEMETRY

## 2024-05-04 PROCEDURE — 2700000000 HC OXYGEN THERAPY PER DAY

## 2024-05-04 PROCEDURE — 97535 SELF CARE MNGMENT TRAINING: CPT

## 2024-05-04 PROCEDURE — 99232 SBSQ HOSP IP/OBS MODERATE 35: CPT | Performed by: STUDENT IN AN ORGANIZED HEALTH CARE EDUCATION/TRAINING PROGRAM

## 2024-05-04 PROCEDURE — 94761 N-INVAS EAR/PLS OXIMETRY MLT: CPT

## 2024-05-04 PROCEDURE — 6360000002 HC RX W HCPCS: Performed by: INTERNAL MEDICINE

## 2024-05-04 PROCEDURE — 36415 COLL VENOUS BLD VENIPUNCTURE: CPT

## 2024-05-04 PROCEDURE — 6360000002 HC RX W HCPCS: Performed by: HOSPITALIST

## 2024-05-04 PROCEDURE — 97530 THERAPEUTIC ACTIVITIES: CPT

## 2024-05-04 PROCEDURE — 97116 GAIT TRAINING THERAPY: CPT

## 2024-05-04 PROCEDURE — 97110 THERAPEUTIC EXERCISES: CPT

## 2024-05-04 PROCEDURE — 2580000003 HC RX 258: Performed by: HOSPITALIST

## 2024-05-04 RX ORDER — BUMETANIDE 0.25 MG/ML
2 INJECTION INTRAMUSCULAR; INTRAVENOUS EVERY 8 HOURS
Status: DISCONTINUED | OUTPATIENT
Start: 2024-05-04 | End: 2024-05-09

## 2024-05-04 RX ADMIN — FINASTERIDE 5 MG: 5 TABLET, FILM COATED ORAL at 09:40

## 2024-05-04 RX ADMIN — SODIUM CHLORIDE, PRESERVATIVE FREE 10 ML: 5 INJECTION INTRAVENOUS at 22:21

## 2024-05-04 RX ADMIN — HEPARIN SODIUM 5000 UNITS: 5000 INJECTION INTRAVENOUS; SUBCUTANEOUS at 22:11

## 2024-05-04 RX ADMIN — ATORVASTATIN CALCIUM 40 MG: 40 TABLET, FILM COATED ORAL at 22:10

## 2024-05-04 RX ADMIN — HYDRALAZINE HYDROCHLORIDE 100 MG: 50 TABLET ORAL at 14:38

## 2024-05-04 RX ADMIN — BUMETANIDE 2 MG: 0.25 INJECTION INTRAMUSCULAR; INTRAVENOUS at 17:59

## 2024-05-04 RX ADMIN — ISOSORBIDE DINITRATE 40 MG: 20 TABLET ORAL at 14:38

## 2024-05-04 RX ADMIN — HEPARIN SODIUM 5000 UNITS: 5000 INJECTION INTRAVENOUS; SUBCUTANEOUS at 14:41

## 2024-05-04 RX ADMIN — SERTRALINE 50 MG: 50 TABLET, FILM COATED ORAL at 09:40

## 2024-05-04 RX ADMIN — HYDRALAZINE HYDROCHLORIDE 100 MG: 50 TABLET ORAL at 22:10

## 2024-05-04 RX ADMIN — HYDRALAZINE HYDROCHLORIDE 100 MG: 50 TABLET ORAL at 06:54

## 2024-05-04 RX ADMIN — ISOSORBIDE DINITRATE 40 MG: 20 TABLET ORAL at 22:10

## 2024-05-04 RX ADMIN — IRON SUCROSE 200 MG: 20 INJECTION, SOLUTION INTRAVENOUS at 15:36

## 2024-05-04 ASSESSMENT — PAIN SCALES - GENERAL
PAINLEVEL_OUTOF10: 0

## 2024-05-04 NOTE — PROGRESS NOTES
RENAL DAILY PROGRESS NOTE              Subjective:       Complaint: breathing is better  Overnight events noted  no nausea, vomiting, chest pain  on 5/4/2024, I saw and examined patient during hemodialysis treatment. The patient was receiving hemodialysis for treatment of  renal failure. I have also reviewed vital signs, intake and output, lab results and recent events, and agreed with today's dialysis order.   IMPRESSION:   Fluid overload in setting of CKD stage 4 and poor response to diuretics,started on dialysis.  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  Hypertension  Hypokalemia  Proteinuria  Hx diabetes  Hx CVA  Anemia,followed by hematology as OP  Hx iron deficiency   PLAN:    Daily HD for now for fluid control  Increase bumex to 2 mg every 8 hrs for now  C/w epogen,iv venofer  Has a chair at [facility] at 12:30 pm. He needs to come at 12:00 for the first visit.           Current Facility-Administered Medications   Medication Dose Route Frequency    bumetanide (BUMEX) injection 2 mg  2 mg IntraVENous q8h    losartan (COZAAR) tablet 100 mg  100 mg Oral Daily    epoetin ole-epbx (RETACRIT) injection 10,000 Units  10,000 Units SubCUTAneous Once per day on Mon Wed Fri    iron sucrose (VENOFER) injection 200 mg  200 mg IntraVENous Q24H    atorvastatin (LIPITOR) tablet 40 mg  40 mg Oral Nightly    finasteride (PROSCAR) tablet 5 mg  5 mg Oral Daily    hydrALAZINE (APRESOLINE) tablet 100 mg  100 mg Oral 3 times per day    isosorbide dinitrate (ISORDIL) tablet 40 mg  40 mg Oral TID    sertraline (ZOLOFT) tablet 50 mg  50 mg Oral Daily    sodium chloride flush 0.9 % injection 5-40 mL  5-40 mL IntraVENous 2 times per day    sodium chloride flush 0.9 % injection 5-40 mL  5-40 mL IntraVENous PRN    0.9 % sodium chloride infusion   IntraVENous PRN    ondansetron (ZOFRAN-ODT) disintegrating tablet 4 mg  4 mg Oral Q8H PRN    Or    ondansetron

## 2024-05-04 NOTE — PLAN OF CARE
Problem: Physical Therapy - Adult  Goal: By Discharge: Performs mobility at highest level of function for planned discharge setting.  See evaluation for individualized goals.  Description: Physical Therapy Goals  Initiated 5/3/2024 and to be accomplished within 7 day(s)  1.  Patient will move from supine to sit and sit to supine in bed with modified independence.    2.  Patient will transfer from bed to chair and chair to bed with modified independence using the least restrictive device.  3.  Patient will perform sit to stand with modified independence.  4.  Patient will ambulate with modified independence for 150 feet with the least restrictive device.   5.  Patient will ascend/descend 2 stairs with handrail(s) with supervision/set-up.    PLOF: Lives with spouse and sister in law. One story with 2 steps to enter. Amb with quad cane.   Outcome: Progressing   PHYSICAL THERAPY TREATMENT    Patient: Amish Shetty (68 y.o. male)  Date: 5/4/2024  Diagnosis: Acute pulmonary edema (HCC) [J81.0]  Elevated brain natriuretic peptide (BNP) level [R79.89]  Edema, unspecified type [R60.9]  Stage 4 chronic kidney disease (HCC) [N18.4] Acute pulmonary edema (HCC)  Precautions: Fall Risk  ASSESSMENT:  Supervision for supine to sit and sit to stand. Amb 25ft with personal quad cane. Completed 2 trials; min A with first trial. Second trial with improved balance, supervision. Completed transfer and toileting on bedside commode with supervision. Returned to seated in bed d/t HD pending. Educated on OOB to chair with staff post HD; verbalized understanding. Educated on need for RN assistance with mobility; verbalized understanding. Call bell in reach.     Progression toward goals:   []      Improving appropriately and progressing toward goals  [x]      Improving slowly and progressing toward goals  []      Not making progress toward goals and plan of care will be adjusted     PLAN:  Patient continues to benefit from skilled intervention  Education  Education Given To: Patient  Education Provided: Role of Therapy;Plan of Care  Education Method: Demonstration;Verbal;Teach Back  Barriers to Learning: None  Education Outcome: Verbalized understanding;Demonstrated understanding;Continued education needed      Joie Aguiar PT  Minutes: 23

## 2024-05-04 NOTE — PROGRESS NOTES
Received pre HD report from  ERIN Almendarez, RN.      Pt in bed, A+O x4, on O2 via NC @ 2L, no s/s of distress noted.      Accessed right upper chest CVC w/o complications.  Tx initiated at 1011.      CVC flowing with ease.  For hemodynamic stability UF goal 3500 ml.  Offered assistance with repositioning every 2 hours.  Vascular access visible at all times during treatment, line connections intact at all times.      Tx completed at 1315, tolerated well 3L removed.  De-accessed per protocol.    Heparin indwell 1.9ml in arterial, and 1.9ml in venous catheter.      Unit nurse ERIN Almendarez, RN given report.

## 2024-05-04 NOTE — PLAN OF CARE
INTERVENTION:  HEMODYNAMIC STABILIZATION  MAINTAIN BP WNL WHILE ON HD.    INTERVENTION:  FLUID MANAGEMENT  WILL ATTEMPT 3500 ML TOTAL FLUID REMOVAL AS TOLERATED.    INTERVENTION:  METABOLIC/ELECTROLYTE MANAGEMENT  2.0 POTASSIUM 2.5 CALCIUM DIALYSATE USED WITH HD TODAY.    INTERVENTION:  HEMODIALYSIS ACCESS SITE MANAGEMENT  RIGHT UPPER CHEST CVC ACCESSED USING ASEPTIC TECHNIQUE.    GOAL:  SIGNS AND SYMPTOMS OF LISTED POTENTIAL PROBLEMS WILL BE ABSENT OR MANAGEABLE.    OUTCOME:  PROGRESSING.    HD PLANNED FOR 3 HOURS TODAY.        Problem: Chronic Conditions and Co-morbidities  Goal: Patient's chronic conditions and co-morbidity symptoms are monitored and maintained or improved  Outcome: Progressing  Flowsheets (Taken 5/4/2024 1017)  Care Plan - Patient's Chronic Conditions and Co-Morbidity Symptoms are Monitored and Maintained or Improved:   Monitor and assess patient's chronic conditions and comorbid symptoms for stability, deterioration, or improvement   Collaborate with multidisciplinary team to address chronic and comorbid conditions and prevent exacerbation or deterioration   Update acute care plan with appropriate goals if chronic or comorbid symptoms are exacerbated and prevent overall improvement and discharge

## 2024-05-04 NOTE — PROGRESS NOTES
4 Eyes Skin Assessment     NAME:  Amish Shetty  YOB: 1955  MEDICAL RECORD NUMBER:  [id number]    The patient is being assessed for  Shift Handoff    I agree that at least one RN has performed a thorough Head to Toe Skin Assessment on the patient. ALL assessment sites listed below have been assessed.      Areas assessed by both nurses:    Head, Face, Ears, Shoulders, Back, Chest, Arms, Elbows, Hands, Sacrum. Buttock, Coccyx, Ischium, and Legs. Feet and Heels        Does the Patient have a Wound? Yes wound(s) were present on assessment. LDA wound assessment was Initiated and completed by RN       Santino Prevention initiated by RN: Yes  Wound Care Orders initiated by RN: No    Pressure Injury (Stage 3,4, Unstageable, DTI, NWPT, and Complex wounds) if present, place Wound referral order by RN under : Yes    New Ostomies, if present place, Ostomy referral order under : No     Nurse 1 eSignature: Electronically signed by charan luo RN on 5/4/24 at 8:41 AM EDT    **SHARE this note so that the co-signing nurse can place an eSignature**    Nurse 2 eSignature: Electronically signed by Pascual Almendarez RN on 5/4/24 at 09:14 AM EDT

## 2024-05-04 NOTE — PLAN OF CARE
Problem: Occupational Therapy - Adult  Goal: By Discharge: Performs self-care activities at highest level of function for planned discharge setting.  See evaluation for individualized goals.  Description: Occupational Therapy Goals:  Initiated 5/3/2024 to be met within 7-10 days.    1.  Patient will perform grooming with supervision/set-up while standing at the sink for > 2 min with Good balance.   2.  Patient will perform bathing with supervision/set-up.  3.  Patient will perform lower body dressing with supervision/set-up.  4.  Patient will perform toilet transfers with supervision/set-up.  5.  Patient will perform all aspects of toileting with supervision/set-up.  6.  Patient will participate in upper extremity therapeutic exercise/activities with supervision/set-up for 8 minutes to improve endurance and UB strength needed for ADLs    7.  Patient will utilize energy conservation techniques during functional activities with verbal cues.    PLOF: Pt lives with spouse, reports being Mod Ind for ADLs and functional mobility using QC.  Outcome: Progressing   OCCUPATIONAL THERAPY TREATMENT    Patient: Amish Shetty (68 y.o. male)  Date: 5/4/2024  Diagnosis: Acute pulmonary edema (HCC) [J81.0]  Elevated brain natriuretic peptide (BNP) level [R79.89]  Edema, unspecified type [R60.9]  Stage 4 chronic kidney disease (HCC) [N18.4] Acute pulmonary edema (HCC)      Precautions: Fall Risk,  ,  ,  ,  ,  ,  ,      Chart, occupational therapy assessment, plan of care, and goals were reviewed.  ASSESSMENT:  Pt presented supine in bed upon entry and agreeable to work with OT. Pt came to EOB SBA in prep for functional tasks requiring increased time for R UE mgmt. Once sitting, pt demo G balance requiring CGA donning socks w/ bend forward and one hand method.  Pt educated on importance of performing PLB technique to prevent SOB and for optimal oxygenation. STS transfer SBA with quad cane. He maneuvered ~ 10 ft CGA with quad cane,

## 2024-05-04 NOTE — PROGRESS NOTES
[facility] Hospitalist Group  Progress Note    Patient: Amish Shetty Age: 68 y.o. : 1955 MR#: [id number] SSN: [id number]      Subjective/24-hour events:     Seen in dialysis. Resting comfortably.  No shortness of breath. No chest pain.     Assessment:   CKD 5, secondary to amyloidosis by biopsy, now ESRD  Volume overload  Hypokalemia   Hypertension  Dyslipidemia  Persistent atrial fibrillation/SSS, not on OAC therapy  Hemoccult positive stool  Chronic anemia, likely multifactorial - chronic disease/iron deficiency  Chronic hypoxic respiratory failure on home oxygen  CHIOMA on home CPAP  Class I obesity  History of CVA with residual deficit - R hemiparesis    Plan:   Hemodialysis initiated yesterday.  Dialysis today. Bumex continued for diuresis.  Outpatient hemodialysis in process of being arranged.  Discussed with Dr. Rowland - assistance appreciated.  Monitor hemoglobin.  On Venofer currently.  Monitor BPs.  Hydralazine added PRN with parameters.  Replace potassium orally.  Monitor.  PT/OT.  Supportive care otherwise.  Follow.    Case discussed with:  [x]Patient  []Family  [x] Nursing  [x]Case Management  DVT Prophylaxis:  []Lovenox  [x]Hep SQ  []SCDs  []Coumadin   []On Heparin gtt []PO anticoagulant    Objective:   VS: BP (!) 170/71   Pulse 55   Temp 97.5 °F (36.4 °C) (Axillary)   Resp 18   Ht 1.753 m (5' 9\")   Wt 99.8 kg (220 lb 0.3 oz)   SpO2 99%   BMI 32.49 kg/m²      Tmax/24hrs: Temp (24hrs), Av.3 °F (36.8 °C), Min:97.5 °F (36.4 °C), Max:99 °F (37.2 °C)    Intake/Output Summary (Last 24 hours) at 2024 1452  Last data filed at 2024 1326  Gross per 24 hour   Intake 1000 ml   Output 7900 ml   Net -6900 ml       Gen:  In NAD.  Lungs: Clear, no wheezes  Effort nonlabored.  CV: RRR.  Abdomen: Soft, NTTP.  Extremities: Warm, no pitting edema or ischemia.  Neuro:  Awake and alert, moves extremities spontaneously.    Current Facility-Administered Medications   Medication

## 2024-05-04 NOTE — PROGRESS NOTES
4 Eyes Skin Assessment     NAME:  Amish Shetty  YOB: 1955  MEDICAL RECORD NUMBER:  [id number]    The patient is being assessed for  Shift Handoff    I agree that at least one RN has performed a thorough Head to Toe Skin Assessment on the patient. ALL assessment sites listed below have been assessed.      Areas assessed by both nurses:    Head, Face, Ears, Shoulders, Back, Chest, Arms, Elbows, Hands, Sacrum. Buttock, Coccyx, Ischium, and Legs. Feet and Heels        Does the Patient have a Wound? Yes wound(s) were present on assessment. LDA wound assessment was Initiated and completed by RN       Santino Prevention initiated by RN: Yes  Wound Care Orders initiated by RN: No    Pressure Injury (Stage 3,4, Unstageable, DTI, NWPT, and Complex wounds) if present, place Wound referral order by RN under : Yes, rd stage 1    New Ostomies, if present place, Ostomy referral order under : No     Nurse 1 eSignature: Electronically signed by charan luo RN on 5/3/24 at 8:06 PM EDT    **SHARE this note so that the co-signing nurse can place an eSignature**    Nurse 2 eSignature: Electronically signed by AMBER Almendarez RN on 5/3/24 at 8:06 PM EDT

## 2024-05-04 NOTE — PLAN OF CARE
Problem: Discharge Planning  Goal: Discharge to home or other facility with appropriate resources  Outcome: Progressing     Problem: Skin/Tissue Integrity  Goal: Absence of new skin breakdown  Description: 1.  Monitor for areas of redness and/or skin breakdown  2.  Assess vascular access sites hourly  3.  Every 4-6 hours minimum:  Change oxygen saturation probe site  4.  Every 4-6 hours:  If on nasal continuous positive airway pressure, respiratory therapy assess nares and determine need for appliance change or resting period.  Outcome: Progressing     Problem: Safety - Adult  Goal: Free from fall injury  Outcome: Progressing     Problem: ABCDS Injury Assessment  Goal: Absence of physical injury  Outcome: Progressing     Problem: Pain  Goal: Verbalizes/displays adequate comfort level or baseline comfort level  Outcome: Progressing     Problem: Chronic Conditions and Co-morbidities  Goal: Patient's chronic conditions and co-morbidity symptoms are monitored and maintained or improved  5/4/2024 1248 by Pascual Almendarez, RN  Outcome: Progressing  5/4/2024 1017 by Waleska Farley RN  Outcome: Progressing  Flowsheets (Taken 5/4/2024 1017)  Care Plan - Patient's Chronic Conditions and Co-Morbidity Symptoms are Monitored and Maintained or Improved:   Monitor and assess patient's chronic conditions and comorbid symptoms for stability, deterioration, or improvement   Collaborate with multidisciplinary team to address chronic and comorbid conditions and prevent exacerbation or deterioration   Update acute care plan with appropriate goals if chronic or comorbid symptoms are exacerbated and prevent overall improvement and discharge     Problem: Physical Therapy - Adult  Goal: By Discharge: Performs mobility at highest level of function for planned discharge setting.  See evaluation for individualized goals.  Description: Physical Therapy Goals  Initiated 5/3/2024 and to be accomplished within 7 day(s)  1.  Patient will move

## 2024-05-05 PROCEDURE — 6360000002 HC RX W HCPCS: Performed by: HOSPITALIST

## 2024-05-05 PROCEDURE — 6370000000 HC RX 637 (ALT 250 FOR IP): Performed by: HOSPITALIST

## 2024-05-05 PROCEDURE — 2700000000 HC OXYGEN THERAPY PER DAY

## 2024-05-05 PROCEDURE — 6370000000 HC RX 637 (ALT 250 FOR IP): Performed by: INTERNAL MEDICINE

## 2024-05-05 PROCEDURE — 2580000003 HC RX 258: Performed by: HOSPITALIST

## 2024-05-05 PROCEDURE — 94761 N-INVAS EAR/PLS OXIMETRY MLT: CPT

## 2024-05-05 PROCEDURE — 6360000002 HC RX W HCPCS: Performed by: INTERNAL MEDICINE

## 2024-05-05 PROCEDURE — 1100000003 HC PRIVATE W/ TELEMETRY

## 2024-05-05 PROCEDURE — 99232 SBSQ HOSP IP/OBS MODERATE 35: CPT | Performed by: STUDENT IN AN ORGANIZED HEALTH CARE EDUCATION/TRAINING PROGRAM

## 2024-05-05 RX ORDER — METOLAZONE 5 MG/1
5 TABLET ORAL DAILY
Status: DISCONTINUED | OUTPATIENT
Start: 2024-05-05 | End: 2024-05-07

## 2024-05-05 RX ADMIN — HEPARIN SODIUM 5000 UNITS: 5000 INJECTION INTRAVENOUS; SUBCUTANEOUS at 15:33

## 2024-05-05 RX ADMIN — HYDRALAZINE HYDROCHLORIDE 100 MG: 50 TABLET ORAL at 15:33

## 2024-05-05 RX ADMIN — BUMETANIDE 2 MG: 0.25 INJECTION INTRAMUSCULAR; INTRAVENOUS at 17:47

## 2024-05-05 RX ADMIN — BUMETANIDE 2 MG: 0.25 INJECTION INTRAMUSCULAR; INTRAVENOUS at 01:03

## 2024-05-05 RX ADMIN — HYDRALAZINE HYDROCHLORIDE 100 MG: 50 TABLET ORAL at 06:53

## 2024-05-05 RX ADMIN — ISOSORBIDE DINITRATE 40 MG: 20 TABLET ORAL at 08:39

## 2024-05-05 RX ADMIN — HYDRALAZINE HYDROCHLORIDE 100 MG: 50 TABLET ORAL at 21:53

## 2024-05-05 RX ADMIN — LOSARTAN POTASSIUM 100 MG: 50 TABLET, FILM COATED ORAL at 08:38

## 2024-05-05 RX ADMIN — BUMETANIDE 2 MG: 0.25 INJECTION INTRAMUSCULAR; INTRAVENOUS at 08:39

## 2024-05-05 RX ADMIN — SERTRALINE 50 MG: 50 TABLET, FILM COATED ORAL at 08:38

## 2024-05-05 RX ADMIN — ISOSORBIDE DINITRATE 40 MG: 20 TABLET ORAL at 15:33

## 2024-05-05 RX ADMIN — IRON SUCROSE 200 MG: 20 INJECTION, SOLUTION INTRAVENOUS at 15:32

## 2024-05-05 RX ADMIN — HEPARIN SODIUM 5000 UNITS: 5000 INJECTION INTRAVENOUS; SUBCUTANEOUS at 21:53

## 2024-05-05 RX ADMIN — SODIUM CHLORIDE, PRESERVATIVE FREE 10 ML: 5 INJECTION INTRAVENOUS at 21:54

## 2024-05-05 RX ADMIN — FINASTERIDE 5 MG: 5 TABLET, FILM COATED ORAL at 08:39

## 2024-05-05 RX ADMIN — SODIUM CHLORIDE, PRESERVATIVE FREE 10 ML: 5 INJECTION INTRAVENOUS at 08:40

## 2024-05-05 RX ADMIN — ATORVASTATIN CALCIUM 40 MG: 40 TABLET, FILM COATED ORAL at 21:53

## 2024-05-05 RX ADMIN — ISOSORBIDE DINITRATE 40 MG: 20 TABLET ORAL at 21:50

## 2024-05-05 RX ADMIN — HEPARIN SODIUM 5000 UNITS: 5000 INJECTION INTRAVENOUS; SUBCUTANEOUS at 06:54

## 2024-05-05 RX ADMIN — METOLAZONE 5 MG: 5 TABLET ORAL at 12:54

## 2024-05-05 ASSESSMENT — PAIN SCALES - GENERAL
PAINLEVEL_OUTOF10: 0

## 2024-05-05 NOTE — PROGRESS NOTES
[facility] Hospitalist Group  Progress Note    Patient: Amish Shetty Age: 68 y.o. : 1955 MR#: [id number] SSN: [id number]      Subjective/24-hour events:     Patient doing well.  Updated that he can likely be discharged tomorrow following dialysis in AM.       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 Bumex continued for diuresis.   Dr. Janett osullivan. Patient now with OP dialysis chair - Per nephro note: [facility] unit[facility] [facility] at 12:30 pm. He needs to come at 12:00 for the first visit.   Monitor hemoglobin.  On Venofer currently.  Monitor BPs.  Hydralazine added PRN with parameters.  Replace potassium orally.  Monitor.  PT/OT.  Supportive care otherwise.  Follow.    Case discussed with:  [x]Patient  []Family  [x] Nursing  [x]Case Management  DVT Prophylaxis:  []Lovenox  [x]Hep SQ  []SCDs  []Coumadin   []On Heparin gtt []PO anticoagulant    Objective:   VS: BP (!) 164/82   Pulse 54   Temp 98 °F (36.7 °C) (Oral)   Resp 18   Ht 1.753 m (5' 9\")   Wt 99.8 kg (220 lb 0.3 oz)   SpO2 96%   BMI 32.49 kg/m²      Tmax/24hrs: Temp (24hrs), Av.9 °F (36.6 °C), Min:97.3 °F (36.3 °C), Max:98.6 °F (37 °C)    Intake/Output Summary (Last 24 hours) at 2024 1621  Last data filed at 2024 1257  Gross per 24 hour   Intake --   Output 1500 ml   Net -1500 ml         Gen:  In NAD.  Lungs: Clear, no wheezes  Effort nonlabored.  CV: RRR.  Abdomen: Soft, NTTP.  Extremities: Warm, no pitting edema or ischemia.  Neuro:  Awake and alert, moves extremities spontaneously.    Current

## 2024-05-05 NOTE — PROGRESS NOTES
4 Eyes Skin Assessment     NAME:  Amish Shetty  YOB: 1955  MEDICAL RECORD NUMBER:  [id number]    The patient is being assessed for  Shift Handoff    I agree that at least one RN has performed a thorough Head to Toe Skin Assessment on the patient. ALL assessment sites listed below have been assessed.      Areas assessed by both nurses:    Head, Face, Ears, Shoulders, Back, Chest, Arms, Elbows, Hands, Sacrum. Buttock, Coccyx, Ischium, Legs. Feet and Heels, and Under Medical Devices         Does the Patient have a Wound? Yes wound(s) were present on assessment. LDA wound assessment was Initiated and completed by RN       Santino Prevention initiated by RN: {YES/NO:19726}  Wound Care Orders initiated by RN: {YES/NO:19726}    Pressure Injury (Stage 3,4, Unstageable, DTI, NWPT, and Complex wounds) if present, place Wound referral order by RN under : {YES/NO:19726}    New Ostomies, if present place, Ostomy referral order under : {YES/NO:19726}     Nurse 1 eSignature: {Esignature:[id number]}    **SHARE this note so that the co-signing nurse can place an eSignature**    Nurse 2 eSignature: {Esignature:[id number]}

## 2024-05-05 NOTE — PLAN OF CARE
Problem: Discharge Planning  Goal: Discharge to home or other facility with appropriate resources  5/5/2024 1219 by Pascual Almendarez RN  Outcome: Progressing  5/5/2024 0428 by Alexys Payne RN  Outcome: Progressing     Problem: Skin/Tissue Integrity  Goal: Absence of new skin breakdown  Description: 1.  Monitor for areas of redness and/or skin breakdown  2.  Assess vascular access sites hourly  3.  Every 4-6 hours minimum:  Change oxygen saturation probe site  4.  Every 4-6 hours:  If on nasal continuous positive airway pressure, respiratory therapy assess nares and determine need for appliance change or resting period.  5/5/2024 1219 by Pascual Almendarez RN  Outcome: Progressing  5/5/2024 0428 by Alexys Payne RN  Outcome: Progressing     Problem: Safety - Adult  Goal: Free from fall injury  5/5/2024 1219 by Pascual Almendarez RN  Outcome: Progressing  5/5/2024 0428 by Alexys Payne RN  Outcome: Progressing     Problem: ABCDS Injury Assessment  Goal: Absence of physical injury  5/5/2024 1219 by Pascual Almendarez RN  Outcome: Progressing  5/5/2024 0428 by Alexys Payne RN  Outcome: Progressing     Problem: Pain  Goal: Verbalizes/displays adequate comfort level or baseline comfort level  5/5/2024 1219 by Pascual Almendarez RN  Outcome: Progressing  5/5/2024 0428 by Alexys Payne RN  Outcome: Progressing     Problem: Chronic Conditions and Co-morbidities  Goal: Patient's chronic conditions and co-morbidity symptoms are monitored and maintained or improved  5/5/2024 1219 by Pascual Almendarez RN  Outcome: Progressing  5/5/2024 0428 by Alexys Payne RN  Outcome: Progressing

## 2024-05-05 NOTE — PROGRESS NOTES
RENAL DAILY PROGRESS NOTE              Subjective:       Complaint: breathing is better  Overnight events noted  no nausea, vomiting, chest pain    IMPRESSION:   Fluid overload in setting of CKD stage 4 and poor response to diuretics,started on dialysis.  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  Hypertension  Hypokalemia  Proteinuria  Hx diabetes  Hx CVA  Anemia,followed by hematology as OP  Hx iron deficiency   PLAN:    Daily HD for now for fluid control.Planning for dialysis again tomorrow.  Add metolazone daily.  C/w epogen,iv venofer.  Has a chair at [facility] at 12:30 pm. He needs to come at 12:00 for the first visit.           Current Facility-Administered Medications   Medication Dose Route Frequency    bumetanide (BUMEX) injection 2 mg  2 mg IntraVENous q8h    losartan (COZAAR) tablet 100 mg  100 mg Oral Daily    epoetin ole-epbx (RETACRIT) injection 10,000 Units  10,000 Units SubCUTAneous Once per day on Mon Wed Fri    iron sucrose (VENOFER) injection 200 mg  200 mg IntraVENous Q24H    atorvastatin (LIPITOR) tablet 40 mg  40 mg Oral Nightly    finasteride (PROSCAR) tablet 5 mg  5 mg Oral Daily    hydrALAZINE (APRESOLINE) tablet 100 mg  100 mg Oral 3 times per day    isosorbide dinitrate (ISORDIL) tablet 40 mg  40 mg Oral TID    sertraline (ZOLOFT) tablet 50 mg  50 mg Oral Daily    sodium chloride flush 0.9 % injection 5-40 mL  5-40 mL IntraVENous 2 times per day    sodium chloride flush 0.9 % injection 5-40 mL  5-40 mL IntraVENous PRN    0.9 % sodium chloride infusion   IntraVENous PRN    ondansetron (ZOFRAN-ODT) disintegrating tablet 4 mg  4 mg Oral Q8H PRN    Or    ondansetron (ZOFRAN) injection 4 mg  4 mg IntraVENous Q6H PRN    polyethylene glycol (GLYCOLAX) packet 17 g  17 g Oral Daily PRN    acetaminophen (TYLENOL) tablet 650 mg  650 mg Oral Q6H PRN    Or    acetaminophen (TYLENOL) suppository 650 mg  650 mg Rectal Q6H

## 2024-05-06 PROCEDURE — 1100000003 HC PRIVATE W/ TELEMETRY

## 2024-05-06 PROCEDURE — 6370000000 HC RX 637 (ALT 250 FOR IP): Performed by: INTERNAL MEDICINE

## 2024-05-06 PROCEDURE — 6370000000 HC RX 637 (ALT 250 FOR IP): Performed by: HOSPITALIST

## 2024-05-06 PROCEDURE — 90935 HEMODIALYSIS ONE EVALUATION: CPT

## 2024-05-06 PROCEDURE — 94761 N-INVAS EAR/PLS OXIMETRY MLT: CPT

## 2024-05-06 PROCEDURE — 6360000002 HC RX W HCPCS: Performed by: INTERNAL MEDICINE

## 2024-05-06 PROCEDURE — 6360000002 HC RX W HCPCS: Performed by: HOSPITALIST

## 2024-05-06 PROCEDURE — 99232 SBSQ HOSP IP/OBS MODERATE 35: CPT | Performed by: HOSPITALIST

## 2024-05-06 PROCEDURE — 2580000003 HC RX 258: Performed by: HOSPITALIST

## 2024-05-06 RX ADMIN — ISOSORBIDE DINITRATE 40 MG: 20 TABLET ORAL at 15:09

## 2024-05-06 RX ADMIN — HEPARIN SODIUM 5000 UNITS: 5000 INJECTION INTRAVENOUS; SUBCUTANEOUS at 06:44

## 2024-05-06 RX ADMIN — HYDRALAZINE HYDROCHLORIDE 100 MG: 50 TABLET ORAL at 21:34

## 2024-05-06 RX ADMIN — HYDRALAZINE HYDROCHLORIDE 100 MG: 50 TABLET ORAL at 15:09

## 2024-05-06 RX ADMIN — SODIUM CHLORIDE, PRESERVATIVE FREE 10 ML: 5 INJECTION INTRAVENOUS at 21:39

## 2024-05-06 RX ADMIN — BUMETANIDE 2 MG: 0.25 INJECTION INTRAMUSCULAR; INTRAVENOUS at 09:03

## 2024-05-06 RX ADMIN — SODIUM CHLORIDE, PRESERVATIVE FREE 10 ML: 5 INJECTION INTRAVENOUS at 09:06

## 2024-05-06 RX ADMIN — SERTRALINE 50 MG: 50 TABLET, FILM COATED ORAL at 09:03

## 2024-05-06 RX ADMIN — METOLAZONE 5 MG: 5 TABLET ORAL at 09:03

## 2024-05-06 RX ADMIN — HYDRALAZINE HYDROCHLORIDE 100 MG: 50 TABLET ORAL at 06:44

## 2024-05-06 RX ADMIN — HEPARIN SODIUM 5000 UNITS: 5000 INJECTION INTRAVENOUS; SUBCUTANEOUS at 21:34

## 2024-05-06 RX ADMIN — FINASTERIDE 5 MG: 5 TABLET, FILM COATED ORAL at 09:03

## 2024-05-06 RX ADMIN — IRON SUCROSE 200 MG: 20 INJECTION, SOLUTION INTRAVENOUS at 15:08

## 2024-05-06 RX ADMIN — BUMETANIDE 2 MG: 0.25 INJECTION INTRAMUSCULAR; INTRAVENOUS at 01:46

## 2024-05-06 RX ADMIN — ISOSORBIDE DINITRATE 40 MG: 20 TABLET ORAL at 21:34

## 2024-05-06 RX ADMIN — BUMETANIDE 2 MG: 0.25 INJECTION INTRAMUSCULAR; INTRAVENOUS at 16:50

## 2024-05-06 RX ADMIN — HEPARIN SODIUM 5000 UNITS: 5000 INJECTION INTRAVENOUS; SUBCUTANEOUS at 15:09

## 2024-05-06 RX ADMIN — ATORVASTATIN CALCIUM 40 MG: 40 TABLET, FILM COATED ORAL at 21:34

## 2024-05-06 ASSESSMENT — PAIN SCALES - GENERAL
PAINLEVEL_OUTOF10: 0

## 2024-05-06 NOTE — PROGRESS NOTES
4 Eyes Skin Assessment     NAME:  Amish Shetty  YOB: 1955  MEDICAL RECORD NUMBER:  [id number]    The patient is being assessed for  Shift Handoff    I agree that at least one RN has performed a thorough Head to Toe Skin Assessment on the patient. ALL assessment sites listed below have been assessed.      Areas assessed by both nurses:    Head, Face, Ears, Shoulders, Back, Chest, Arms, Elbows, Hands, Sacrum. Buttock, Coccyx, Ischium, and Legs. Feet and Heels        Does the Patient have a Wound? Yes wound(s) were present on assessment. LDA wound assessment was Initiated and completed by RN       Santino Prevention initiated by RN: Yes  Wound Care Orders initiated by RN: No    Pressure Injury (Stage 3,4, Unstageable, DTI, NWPT, and Complex wounds) if present, place Wound referral order by RN under : Yes    New Ostomies, if present place, Ostomy referral order under : No     Nurse 1 eSignature: Electronically signed by Pascual Almendarez RN on 5/5/24 at 8:01 PM EDT    **SHARE this note so that the co-signing nurse can place an eSignature**    Nurse 2 eSignature: Electronically signed by JEREMY CARDOSO RN on 5/5/24 at 11:14 PM EDT

## 2024-05-06 NOTE — PROGRESS NOTES
[facility]  Progress Note    Patient: Amish Shetty Age: 68 y.o. : 1955 MR#: [id number] SSN: [id number]      Subjective/24-hour events:     Patient still experiencing shortness of breath.  States he is not on fluid restrictions, and he has not received education regarding dialysis diet.  Seen on dialysis, he reports no issues with treatment.  Nursing reports no issues.      Assessment:     1.  CKD 5, secondary to amyloidosis by biopsy, now ESRD.  OP dialysis chair - Per nephro note: [facility] at 12:30 pm. He needs to come at 12:00 for the first visit.  Consult nutritionist regarding dialysis diet.  Fluid restrictions.  2.  Volume overload.  Fluid restrictions.  Daily dialysis per nephrology.  3.  Hypokalemia replete as needed.  4.  Hypertension  5.  Dyslipidemia  6.  sick sinus syndrome /persistent A-fib- not on anticoagulation other than 81 mg ASA.  Per cardiology notes, patient has declined to be on anticoagulation in the outpatient setting.   7.  Recent Heme + stool w/o overt GI bleeding   8.  Chronic anemia, likely multifactorial - chronic disease/iron deficiency  9.  Chronic hypoxic respiratory failure on home oxygen  10.  CHIOMA on home CPAP  11.  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  Continue aspirin, statin.   12.  Recent diagnosis of renal amyloid.  Follow amyloid scan.  13.  DVT prophylaxis  14.  Full code.  PT OT.  I discussed the case with Dr. Rowland.    Case discussed with:  [x]Patient  []Family  [x] Nursing  [x]Case Management on IDT.  DVT Prophylaxis:  []Lovenox  [x]Hep SQ  []SCDs  []Coumadin   []On Heparin gtt []PO anticoagulant    Objective:   VS: BP (!) 152/83   Pulse 50   Temp 98.4 °F (36.9 °C) (Axillary)   Resp 17   Ht 1.753 m (5' 9\")   Wt 90.4 kg (199 lb 4.7 oz)   SpO2 98%   BMI 29.43 kg/m²      Tmax/24hrs: Temp (24hrs), Av.2 °F (36.8 °C), Min:98 °F (36.7 °C), Max:98.5 °F (36.9 °C)    Intake/Output Summary (Last 24

## 2024-05-06 NOTE — WOUND CARE
4 Eyes Skin Assessment     NAME:  Amish Shetty  YOB: 1955  MEDICAL RECORD NUMBER:  [id number]    The patient is being assessed for  Shift Handoff    I agree that at least one RN has performed a thorough Head to Toe Skin Assessment on the patient. ALL assessment sites listed below have been assessed.      Areas assessed by both nurses:    Head, Face, Ears, Shoulders, Back, Chest, Arms, Elbows, Hands, Sacrum. Buttock, Coccyx, Ischium, and Legs. Feet and Heels        Does the Patient have a Wound? Yes wound(s) were present on assessment. LDA wound assessment was Initiated and completed by RN       Santino Prevention initiated by RN: Yes, assess each shift  Wound Care Orders initiated by RN: Yes,     Pressure Injury (Stage 3,4, Unstageable, DTI, NWPT, and Complex wounds) if present, place Wound referral order by RN under : Yes, stage 1.    New Ostomies, if present place, Ostomy referral order under : No     Nurse 1 eSignature: Electronically signed by JEREMY CARDOSO RN on 5/6/24 at 8:19 AM EDT    **SHARE this note so that the co-signing nurse can place an eSignature**    Nurse 2 eSignature: {Esignature:[id number]}

## 2024-05-06 NOTE — PROGRESS NOTES
1013: Attempted PT treatment. Off the floor. Will follow up.   1240: 2nd PT attempt. Remains off the floor at HD. Will follow up.

## 2024-05-06 NOTE — PROGRESS NOTES
RENAL DAILY PROGRESS NOTE              Subjective:       Complaint: breathing is better but not at baseline per him  Overnight events noted  no nausea, vomiting, chest pain  At 10:11 AM on 5/6/2024, I saw and examined patient during hemodialysis treatment. The patient was receiving hemodialysis for treatment of  renal failure. I have also reviewed vital signs, intake and output, lab results and recent events, and agreed with today's dialysis order.     IMPRESSION:   Fluid overload in setting of CKD stage 4 and poor response to diuretics,started on dialysis.  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  Hypertension  Hypokalemia  Proteinuria  Hx diabetes  Hx CVA  Anemia,followed by hematology as OP  Hx iron deficiency   PLAN:   C/w bumex,metolozone.Labs tomorrow. Still feels out of breath  Hold epogen for now while still sob,fluid overloaded  iv venofer.  continue with dialysis daily for now  Has a chair at [facility]-- [facility] at 12:30 pm. He needs to come at 12:00 for the first visit.  Would prefer to keep him back in hospital for daily dialysis and aggressive diuresis.  Does this patient benefit from amyloid scan? To evaluate for cardiac amyloidosis as this can alter the prognosis as survival with cardiac amyloidosis is very poor. Will discuss with Primary           Current Facility-Administered Medications   Medication Dose Route Frequency    metOLazone (ZAROXOLYN) tablet 5 mg  5 mg Oral Daily    bumetanide (BUMEX) injection 2 mg  2 mg IntraVENous q8h    losartan (COZAAR) tablet 100 mg  100 mg Oral Daily    epoetin ole-epbx (RETACRIT) injection 10,000 Units  10,000 Units SubCUTAneous Once per day on Mon Wed Fri    iron sucrose (VENOFER) injection 200 mg  200 mg IntraVENous Q24H    atorvastatin (LIPITOR) tablet 40 mg  40 mg Oral Nightly    finasteride (PROSCAR) tablet 5 mg  5 mg Oral Daily

## 2024-05-06 NOTE — PLAN OF CARE
Problem: Discharge Planning  Goal: Discharge to home or other facility with appropriate resources  Outcome: Progressing     Problem: Safety - Adult  Goal: Free from fall injury  5/6/2024 1049 by Trenton Woods RN  Outcome: Progressing  5/6/2024 1046 by Trenton Woods, RN  Outcome: Progressing

## 2024-05-06 NOTE — CARE COORDINATION
05/06/24 1630   /Social Work Whiteboard Notes   /Social Work Whiteboard RED 05/06/2024 When medically ready patient dispo is home with wife to transport. [facility] was placed in the Que per patient star rated request. RUBEN     SW left voicemail [facility] at [phone] informing them she placed patient in the QUE.    Bobbi Carrion BSW   Case Management

## 2024-05-06 NOTE — DIALYSIS
HD Care plan  Time: 3 hrs  Dialysate: 3 K+   2.5 Ca++  Bath  Net UF: 3 L  Access: Aseptic care for RT Chest TDC  Hemodynamic stability: Maintain BP WNL     Pre Dialysis:  Patient received from Chuck Chowdhury RN, Patient on a bed, A+O x 4, on oxygen 2L via NC, SPO2 98%, no s/s of acute distress noted but has dyspnea on exertion. RT Chest TDC  assessed, dressing noted to be soiled with old blood, same changed. TDC accessed per protocol without any difficulty, line patent with good flow.     Intra Dialysis:  Time out / safety process performed per policy. Tx initiated at 0930.    TDC flowing with ease. For hemodynamic stability UF goal set at 3000 ml as tolerated.  0945: Dr Rowland in attendance , Net UF increased to 3500 per his order.  Pt offered assistance with repositioning every 2 hours/prn    Vascular access visible and line connections remained intact throughout entire duration of treatment.   Vital signs checked every 15 mins.     Post Dialysis:  Tx completed at 1230,   Tolerated well, 3.5 L  removed.  De-accessed per protocol.    Dialysis catheter locked accordingly with Heparin 1.9 ml in arterial port, and 1.9 ml in venous port. Catheter dressing clean, dry and intact.  Post Dialysis report given to FRANSISCA Chowdhury

## 2024-05-06 NOTE — PROGRESS NOTES
4 Eyes Skin Assessment     NAME:  Amish Shetty  YOB: 1955  MEDICAL RECORD NUMBER:  [id number]    The patient is being assessed for  Shift Handoff    I agree that at least one RN has performed a thorough Head to Toe Skin Assessment on the patient. ALL assessment sites listed below have been assessed.      Areas assessed by both nurses:    Head, Face, Ears, Shoulders, Back, Chest, Arms, Elbows, Hands, Sacrum. Buttock, Coccyx, Ischium, Legs. Feet and Heels, and Under Medical Devices         Does the Patient have a Wound? Yes wound(s) were present on assessment. LDA wound assessment was Initiated and completed by RN       Santino Prevention initiated by RN: Yes  Wound Care Orders initiated by RN: No    Pressure Injury (Stage 3,4, Unstageable, DTI, NWPT, and Complex wounds) if present, place Wound referral order by RN under : No    New Ostomies, if present place, Ostomy referral order under : No     Nurse 1 eSignature: Electronically signed by Trenton Woods RN on 5/6/24 at 6:50 PM EDT    **SHARE this note so that the co-signing nurse can place an eSignature**    Nurse 2 eSignature: Tricia

## 2024-05-07 ENCOUNTER — APPOINTMENT (OUTPATIENT)
Facility: HOSPITAL | Age: 69
End: 2024-05-07
Payer: MEDICARE

## 2024-05-07 LAB
ALBUMIN SERPL-MCNC: 2.2 G/DL (ref 3.4–5)
ANION GAP SERPL CALC-SCNC: 7 MMOL/L (ref 3–18)
BUN SERPL-MCNC: 27 MG/DL (ref 7–18)
BUN/CREAT SERPL: 11 (ref 12–20)
CALCIUM SERPL-MCNC: 7.8 MG/DL (ref 8.5–10.1)
CHLORIDE SERPL-SCNC: 101 MMOL/L (ref 100–111)
CO2 SERPL-SCNC: 29 MMOL/L (ref 21–32)
CREAT SERPL-MCNC: 2.47 MG/DL (ref 0.6–1.3)
GLUCOSE SERPL-MCNC: 91 MG/DL (ref 74–99)
MAGNESIUM SERPL-MCNC: 2 MG/DL (ref 1.6–2.6)
PHOSPHATE SERPL-MCNC: 3.9 MG/DL (ref 2.5–4.9)
POTASSIUM SERPL-SCNC: 3.1 MMOL/L (ref 3.5–5.5)
SODIUM SERPL-SCNC: 137 MMOL/L (ref 136–145)

## 2024-05-07 PROCEDURE — 1100000003 HC PRIVATE W/ TELEMETRY

## 2024-05-07 PROCEDURE — 99232 SBSQ HOSP IP/OBS MODERATE 35: CPT | Performed by: HOSPITALIST

## 2024-05-07 PROCEDURE — 36415 COLL VENOUS BLD VENIPUNCTURE: CPT

## 2024-05-07 PROCEDURE — 90935 HEMODIALYSIS ONE EVALUATION: CPT

## 2024-05-07 PROCEDURE — 6360000002 HC RX W HCPCS: Performed by: INTERNAL MEDICINE

## 2024-05-07 PROCEDURE — 6370000000 HC RX 637 (ALT 250 FOR IP): Performed by: INTERNAL MEDICINE

## 2024-05-07 PROCEDURE — 6370000000 HC RX 637 (ALT 250 FOR IP): Performed by: HOSPITALIST

## 2024-05-07 PROCEDURE — 83735 ASSAY OF MAGNESIUM: CPT

## 2024-05-07 PROCEDURE — 2580000003 HC RX 258: Performed by: HOSPITALIST

## 2024-05-07 PROCEDURE — 80069 RENAL FUNCTION PANEL: CPT

## 2024-05-07 PROCEDURE — 6360000002 HC RX W HCPCS: Performed by: HOSPITALIST

## 2024-05-07 RX ORDER — POTASSIUM CHLORIDE 20 MEQ/1
40 TABLET, EXTENDED RELEASE ORAL ONCE
Status: COMPLETED | OUTPATIENT
Start: 2024-05-07 | End: 2024-05-07

## 2024-05-07 RX ADMIN — ISOSORBIDE DINITRATE 40 MG: 20 TABLET ORAL at 22:35

## 2024-05-07 RX ADMIN — ACETAMINOPHEN 650 MG: 325 TABLET ORAL at 16:30

## 2024-05-07 RX ADMIN — ACETAMINOPHEN 650 MG: 325 TABLET ORAL at 22:49

## 2024-05-07 RX ADMIN — BUMETANIDE 2 MG: 0.25 INJECTION INTRAMUSCULAR; INTRAVENOUS at 00:15

## 2024-05-07 RX ADMIN — ISOSORBIDE DINITRATE 40 MG: 20 TABLET ORAL at 13:28

## 2024-05-07 RX ADMIN — HEPARIN SODIUM 5000 UNITS: 5000 INJECTION INTRAVENOUS; SUBCUTANEOUS at 13:28

## 2024-05-07 RX ADMIN — HYDRALAZINE HYDROCHLORIDE 100 MG: 50 TABLET ORAL at 13:28

## 2024-05-07 RX ADMIN — POTASSIUM CHLORIDE 40 MEQ: 1500 TABLET, EXTENDED RELEASE ORAL at 16:41

## 2024-05-07 RX ADMIN — BUMETANIDE 2 MG: 0.25 INJECTION INTRAMUSCULAR; INTRAVENOUS at 16:32

## 2024-05-07 RX ADMIN — IRON SUCROSE 200 MG: 20 INJECTION, SOLUTION INTRAVENOUS at 13:28

## 2024-05-07 RX ADMIN — HYDRALAZINE HYDROCHLORIDE 100 MG: 50 TABLET ORAL at 06:11

## 2024-05-07 RX ADMIN — SODIUM CHLORIDE, PRESERVATIVE FREE 10 ML: 5 INJECTION INTRAVENOUS at 22:40

## 2024-05-07 RX ADMIN — HYDRALAZINE HYDROCHLORIDE 100 MG: 50 TABLET ORAL at 22:36

## 2024-05-07 RX ADMIN — HEPARIN SODIUM 5000 UNITS: 5000 INJECTION INTRAVENOUS; SUBCUTANEOUS at 22:37

## 2024-05-07 RX ADMIN — HEPARIN SODIUM 5000 UNITS: 5000 INJECTION INTRAVENOUS; SUBCUTANEOUS at 06:11

## 2024-05-07 RX ADMIN — ATORVASTATIN CALCIUM 40 MG: 40 TABLET, FILM COATED ORAL at 22:35

## 2024-05-07 ASSESSMENT — PAIN SCALES - GENERAL
PAINLEVEL_OUTOF10: 2
PAINLEVEL_OUTOF10: 2
PAINLEVEL_OUTOF10: 0
PAINLEVEL_OUTOF10: 5
PAINLEVEL_OUTOF10: 0
PAINLEVEL_OUTOF10: 3
PAINLEVEL_OUTOF10: 0
PAINLEVEL_OUTOF10: 2
PAINLEVEL_OUTOF10: 0

## 2024-05-07 ASSESSMENT — PAIN DESCRIPTION - FREQUENCY
FREQUENCY: INTERMITTENT
FREQUENCY: INTERMITTENT

## 2024-05-07 ASSESSMENT — PAIN DESCRIPTION - LOCATION
LOCATION: KNEE
LOCATION: KNEE

## 2024-05-07 ASSESSMENT — PAIN - FUNCTIONAL ASSESSMENT: PAIN_FUNCTIONAL_ASSESSMENT: PREVENTS OR INTERFERES SOME ACTIVE ACTIVITIES AND ADLS

## 2024-05-07 ASSESSMENT — PAIN DESCRIPTION - ORIENTATION
ORIENTATION: LEFT
ORIENTATION: LEFT

## 2024-05-07 ASSESSMENT — PAIN DESCRIPTION - PAIN TYPE
TYPE: OTHER (COMMENT)
TYPE: ACUTE PAIN
TYPE: OTHER (COMMENT)

## 2024-05-07 ASSESSMENT — PAIN DESCRIPTION - DESCRIPTORS
DESCRIPTORS: ACHING
DESCRIPTORS: ACHING

## 2024-05-07 ASSESSMENT — PAIN DESCRIPTION - ONSET
ONSET: ON-GOING
ONSET: GRADUAL

## 2024-05-07 NOTE — PROGRESS NOTES
HD Care plan  Time: 3 HRS  Dialysate:   3.5 K+   2.5  Ca++  Bath  Net UF: 3.0 L  Access: Aseptically care for Rt Subclavian TDC  Hemodynamic stability: Maintain BP WNL    Pre Dialysis:  Pt received from Huma RAMOS RN, unit nurse, on a bed, A&O X 4, with no s/s of distress noted, on nasal cannula.   Time out / safety process performed per policy.  Rt Chest TDC assessed no abnormalities noted, line patent with good flow.  TDC accessed  per protocol without any difficulty    Intra Dialysis:  Tx initiated at 0925.    TDC flowing with ease. For hemodynamic stability UF goal set at 3000 ml as tolerated.    Vascular access visible  and line connections remained intact throughout entire duration of treatment.   Vital signs checked every 15 mins, WNL    Post Dialysis:  Tx completed at 1229. Tolerated well.  Net Fluid removed: 3.0 L  De-accessed per protocol.    Dialysis catheter locked accordingly with Heparin 1.9 ml in arterial port, and 1.9 ml in venous port. Catheter dressing clean, dry and intact.    Attempted to call Post Dialysis report without success.     Pt transported back to his room, in no apparent distress by transport staff.

## 2024-05-07 NOTE — PROGRESS NOTES
4 Eyes Skin Assessment     NAME:  Amish Shetty  YOB: 1955  MEDICAL RECORD NUMBER:  [id number]    The patient is being assessed for  Shift Handoff    I agree that at least one RN has performed a thorough Head to Toe Skin Assessment on the patient. ALL assessment sites listed below have been assessed.      Areas assessed by both nurses:    Head, Face, Ears, Shoulders, Back, Chest, Arms, Elbows, Hands, Sacrum. Buttock, Coccyx, Ischium, and Legs. Feet and Heels        Does the Patient have a Wound? No noted wound(s)       Santino Prevention initiated by RN: Yes  Wound Care Orders initiated by RN: No    Pressure Injury (Stage 3,4, Unstageable, DTI, NWPT, and Complex wounds) if present, place Wound referral order by RN under : No    New Ostomies, if present place, Ostomy referral order under : No     Nurse 1 eSignature: Electronically signed by Tricia Fish RN on 5/7/24 at 7:41 AM EDT    **SHARE this note so that the co-signing nurse can place an eSignature**    Nurse 2 eSignature: Electronically signed by Pascual Almendarez RN on 5/7/24 at 7:42 AM EDT

## 2024-05-07 NOTE — PROGRESS NOTES
4 Eyes Skin Assessment     NAME:  Amish Shetty  YOB: 1955  MEDICAL RECORD NUMBER:  [id number]    The patient is being assessed for  Shift Handoff    I agree that at least one RN has performed a thorough Head to Toe Skin Assessment on the patient. ALL assessment sites listed below have been assessed.      Areas assessed by both nurses:    Head, Face, Ears, Shoulders, Back, Chest, Arms, Elbows, Hands, Sacrum. Buttock, Coccyx, Ischium, and Legs. Feet and Heels        Does the Patient have a Wound? No noted wound(s) Stage 1 sacrum,       Santino Prevention initiated by RN: Yes  Wound Care Orders initiated by RN: No    Pressure Injury (Stage 3,4, Unstageable, DTI, NWPT, and Complex wounds) if present, place Wound referral order by RN under : No    New Ostomies, if present place, Ostomy referral order under : No     Nurse 1 eSignature: Electronically signed by SAAD SCOTT RN on 5/7/24 at 5:49 PM EDT    **SHARE this note so that the co-signing nurse can place an eSignature**    Nurse 2 eSignature: Electronically signed by JEREMY CARDOSO RN on 5/8/24 at 6:27 AM EDT

## 2024-05-07 NOTE — PLAN OF CARE
Problem: Chronic Conditions and Co-morbidities  Goal: Patient's chronic conditions and co-morbidity symptoms are monitored and maintained or improved  Outcome: Progressing  Flowsheets (Taken 5/7/2024 1056)  Care Plan - Patient's Chronic Conditions and Co-Morbidity Symptoms are Monitored and Maintained or Improved:   Monitor and assess patient's chronic conditions and comorbid symptoms for stability, deterioration, or improvement   Collaborate with multidisciplinary team to address chronic and comorbid conditions and prevent exacerbation or deterioration

## 2024-05-07 NOTE — PROGRESS NOTES
[facility]  Progress Note    Patient: Amish Shetty Age: 68 y.o. : 1955 MR#: [id number] SSN: [id number]      Subjective/24-hour events:     Patient seen and examined on dialysis, he reports his breathing is better.  He overall feels improved.  Awaiting education regarding dialysis diet.  Inquires regarding amyloid scan dialysis nurse at bedside states if patient requires dialysis will be tomorrow afternoon, after scan completed in the morning.      Assessment:     1.  CKD 5, secondary to amyloidosis by biopsy, now ESRD.  OP dialysis chair - Per nephro note: [facility] unit-- [facility] at 12:30 pm. He needs to come at 12:00 for the first visit.  Consult nutritionist regarding dialysis diet.  Fluid restrictions.  2.  Volume overload.  Fluid restrictions.  Daily dialysis per nephrology.  3.  Hypokalemia replete as needed.  4.  Hypertension  5.  Dyslipidemia  6.  sick sinus syndrome /persistent A-fib- not on anticoagulation other than 81 mg ASA.  Per cardiology notes, patient has declined to be on anticoagulation in the outpatient setting.   7.  Recent Heme + stool w/o overt GI bleeding   8.  Chronic anemia, likely multifactorial - chronic disease/iron deficiency  9.  Chronic hypoxic respiratory failure on home oxygen  10.  CHIOMA on home CPAP  11.  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  Continue aspirin, statin.   12.  Recent diagnosis of renal amyloid.  Follow amyloid scan.  13.  DVT prophylaxis  14.  Full code.  PT OT.     Case discussed with:  [x]Patient  []Family  [x] Nursing  [x]Case Management on IDT.  DVT Prophylaxis:  []Lovenox  [x]Hep SQ  []SCDs  []Coumadin   []On Heparin gtt []PO anticoagulant    Objective:   VS: BP (!) 145/69   Pulse 67   Temp 97.1 °F (36.2 °C)   Resp 18   Ht 1.753 m (5' 9\")   Wt 90.4 kg (199 lb 4.7 oz)   SpO2 96%   BMI 29.43 kg/m²      Tmax/24hrs: Temp (24hrs), Av.5 °F (36.9 °C), Min:97.1 °F (36.2 °C), Max:100.4 °F (38  °C)    Intake/Output Summary (Last 24 hours) at 5/7/2024 1553  Last data filed at 5/7/2024 1229  Gross per 24 hour   Intake 863.8 ml   Output 6700 ml   Net -5836.2 ml       Gen:  In NAD.  Awake alert and oriented.  Seen on dialysis.  HEENT normocephalic atraumatic PERRL.  Oropharynx clear  Lungs: Clear, no wheezes  Effort nonlabored.  CV: RRR.  Abdomen: Soft, NTTP.  Nondistended nabs.  Extremities: Warm, no pitting edema or ischemia.  Neuro:  Awake and alert, moves extremities spontaneously.  No rash to visible skin    Current Facility-Administered Medications   Medication Dose Route Frequency    bumetanide (BUMEX) injection 2 mg  2 mg IntraVENous q8h    losartan (COZAAR) tablet 100 mg  100 mg Oral Daily    [Held by provider] epoetin ole-epbx (RETACRIT) injection 10,000 Units  10,000 Units SubCUTAneous Once per day on Mon Wed Fri    atorvastatin (LIPITOR) tablet 40 mg  40 mg Oral Nightly    finasteride (PROSCAR) tablet 5 mg  5 mg Oral Daily    hydrALAZINE (APRESOLINE) tablet 100 mg  100 mg Oral 3 times per day    isosorbide dinitrate (ISORDIL) tablet 40 mg  40 mg Oral TID    sertraline (ZOLOFT) tablet 50 mg  50 mg Oral Daily    sodium chloride flush 0.9 % injection 5-40 mL  5-40 mL IntraVENous 2 times per day    sodium chloride flush 0.9 % injection 5-40 mL  5-40 mL IntraVENous PRN    0.9 % sodium chloride infusion   IntraVENous PRN    ondansetron (ZOFRAN-ODT) disintegrating tablet 4 mg  4 mg Oral Q8H PRN    Or    ondansetron (ZOFRAN) injection 4 mg  4 mg IntraVENous Q6H PRN    polyethylene glycol (GLYCOLAX) packet 17 g  17 g Oral Daily PRN    acetaminophen (TYLENOL) tablet 650 mg  650 mg Oral Q6H PRN    Or    acetaminophen (TYLENOL) suppository 650 mg  650 mg Rectal Q6H PRN    heparin (porcine) injection 5,000 Units  5,000 Units SubCUTAneous 3 times per day    heparin (porcine) injection 3,800 Units  3,800 Units IntraCATHeter PRN        Labs:    Recent Results (from the past 24 hour(s))   Renal Function Panel

## 2024-05-07 NOTE — PROGRESS NOTES
RENAL DAILY PROGRESS NOTE              Subjective:       Complaint: breathing is much better  Overnight events noted  no nausea, vomiting, chest pain   on 5/7/2024, I saw and examined patient during hemodialysis treatment. The patient was receiving hemodialysis for treatment of  renal failure. I have also reviewed vital signs, intake and output, lab results and recent events, and agreed with today's dialysis order.     IMPRESSION:   Fluid overload in setting of CKD stage 4 and poor response to diuretics,started on dialysis.  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  Hypertension  Hypokalemia  Proteinuria  Hx diabetes  Hx CVA  Anemia,followed by hematology as OP  Hx iron deficiency   PLAN:   Can stop  metolozone now. High K bath. C/w bumex. Now getting euvolemic. Should be ready by tomorrow for discharge pending amyloid scan.  iv venofer.  continue with dialysis daily for now  Has a [place] at [facility] [place]. He needs to come at 12:00 for the first visit.           Current Facility-Administered Medications   Medication Dose Route Frequency    bumetanide (BUMEX) injection 2 mg  2 mg IntraVENous q8h    losartan (COZAAR) tablet 100 mg  100 mg Oral Daily    [Held by provider] epoetin ole-epbx (RETACRIT) injection 10,000 Units  10,000 Units SubCUTAneous Once per day on Mon Wed Fri    iron sucrose (VENOFER) injection 200 mg  200 mg IntraVENous Q24H    atorvastatin (LIPITOR) tablet 40 mg  40 mg Oral Nightly    finasteride (PROSCAR) tablet 5 mg  5 mg Oral Daily    hydrALAZINE (APRESOLINE) tablet 100 mg  100 mg Oral 3 times per day    isosorbide dinitrate (ISORDIL) tablet 40 mg  40 mg Oral TID    sertraline (ZOLOFT) tablet 50 mg  50 mg Oral Daily    sodium chloride flush 0.9 % injection 5-40 mL  5-40 mL IntraVENous 2 times per day    sodium chloride flush 0.9 % injection 5-40 mL   5-40 mL IntraVENous PRN    0.9 % sodium chloride infusion   IntraVENous PRN    ondansetron (ZOFRAN-ODT) disintegrating tablet 4 mg  4 mg Oral Q8H PRN    Or    ondansetron (ZOFRAN) injection 4 mg  4 mg IntraVENous Q6H PRN    polyethylene glycol (GLYCOLAX) packet 17 g  17 g Oral Daily PRN    acetaminophen (TYLENOL) tablet 650 mg  650 mg Oral Q6H PRN    Or    acetaminophen (TYLENOL) suppository 650 mg  650 mg Rectal Q6H PRN    heparin (porcine) injection 5,000 Units  5,000 Units SubCUTAneous 3 times per day    heparin (porcine) injection 3,800 Units  3,800 Units IntraCATHeter PRN       Review of Symptoms: comprehensive ROS negative except above.   Objective:   Patient Vitals for the past 24 hrs:   Temp Pulse Resp BP SpO2   05/07/24 0925 97.1 °F (36.2 °C) (!) 48 -- 133/65 --   05/07/24 0920 97.1 °F (36.2 °C) (!) 47 18 (!) 142/62 98 %   05/07/24 0818 98 °F (36.7 °C) 51 20 (!) 171/75 97 %   05/07/24 0700 -- (!) 42 -- -- --   05/07/24 0500 -- 51 -- -- --   05/07/24 0449 98.4 °F (36.9 °C) 82 20 (!) 163/87 99 %   05/07/24 0300 -- (!) 43 -- -- --   05/07/24 0100 -- 52 -- -- --   05/07/24 0011 98.8 °F (37.1 °C) 51 20 136/81 97 %   05/06/24 2300 -- 54 -- -- --   05/06/24 2100 -- 61 -- -- --   05/06/24 2030 100.4 °F (38 °C) 66 20 135/63 96 %   05/06/24 1900 -- 61 -- -- --   05/06/24 1611 100 °F (37.8 °C) 56 17 (!) 136/58 98 %   05/06/24 1300 -- 58 -- -- --   05/06/24 1245 98 °F (36.7 °C) 61 14 (!) 174/84 --   05/06/24 1230 98.4 °F (36.9 °C) 57 18 (!) 175/87 98 %   05/06/24 1215 -- 57 -- (!) 179/84 --   05/06/24 1200 -- 53 -- (!) 174/85 --   05/06/24 1145 -- 54 -- (!) 178/79 --   05/06/24 1130 -- 53 -- (!) 177/80 --   05/06/24 1115 -- 53 -- (!) 169/87 --          Weight change:      05/05 1901 - 05/07 0700  In: 1340 [P.O.:840]  Out: 9920 [Urine:5920]    Intake/Output Summary (Last 24 hours) at 5/7/2024 1108  Last data filed at 5/7/2024 0615  Gross per 24 hour   Intake 1100 ml   Output 7200 ml   Net -6100 ml       Physical Exam:

## 2024-05-07 NOTE — CONSULTS
Nutrition Education    Education provided on Low Potassium, Low Sodium and Fluid Restriction   Learners: Patient  Readiness: Acceptance  Method: Handout  Response:  added to discharge instructions  Contact name and number provided.    JAIME KIRBY RD, MS  Remotely Charting from [facility]  Contact via Perfect Serve or office [phone]

## 2024-05-07 NOTE — PLAN OF CARE
Problem: Skin/Tissue Integrity  Goal: Absence of new skin breakdown  Description: 1.  Monitor for areas of redness and/or skin breakdown  2.  Assess vascular access sites hourly  3.  Every 4-6 hours minimum:  Change oxygen saturation probe site  4.  Every 4-6 hours:  If on nasal continuous positive airway pressure, respiratory therapy assess nares and determine need for appliance change or resting period.  5/6/2024 2220 by Tricia Fish RN  Outcome: Progressing  5/6/2024 1049 by Trenton Woods RN  Outcome: Progressing  5/6/2024 1046 by Trenton Woods RN  Outcome: Progressing     Problem: Safety - Adult  Goal: Free from fall injury  5/6/2024 2220 by Tricia Fish RN  Outcome: Progressing  5/6/2024 1049 by Trenton Woods RN  Outcome: Progressing  5/6/2024 1046 by Trenton Woods RN  Outcome: Progressing

## 2024-05-07 NOTE — PROGRESS NOTES
Occupational Therapy    Attempted OT tx, however pt is at HD. Will continue to follow this pt. Thank you for your referral.    ALLIE Diaz/ SILVANO

## 2024-05-08 ENCOUNTER — APPOINTMENT (OUTPATIENT)
Facility: HOSPITAL | Age: 69
End: 2024-05-08
Payer: MEDICARE

## 2024-05-08 LAB
ANION GAP SERPL CALC-SCNC: 6 MMOL/L (ref 3–18)
BASOPHILS # BLD: 0.1 K/UL (ref 0–0.1)
BASOPHILS NFR BLD: 1 % (ref 0–2)
BUN SERPL-MCNC: 32 MG/DL (ref 7–18)
BUN/CREAT SERPL: 12 (ref 12–20)
CALCIUM SERPL-MCNC: 8.1 MG/DL (ref 8.5–10.1)
CHLORIDE SERPL-SCNC: 100 MMOL/L (ref 100–111)
CO2 SERPL-SCNC: 29 MMOL/L (ref 21–32)
CREAT SERPL-MCNC: 2.61 MG/DL (ref 0.6–1.3)
DIFFERENTIAL METHOD BLD: ABNORMAL
ECHO BSA: 2.23 M2
EOSINOPHIL # BLD: 0.2 K/UL (ref 0–0.4)
EOSINOPHIL NFR BLD: 2 % (ref 0–5)
ERYTHROCYTE [DISTWIDTH] IN BLOOD BY AUTOMATED COUNT: 17.9 % (ref 11.6–14.5)
GLUCOSE SERPL-MCNC: 94 MG/DL (ref 74–99)
HCT VFR BLD AUTO: 25.2 % (ref 36–48)
HGB BLD-MCNC: 7.8 G/DL (ref 13–16)
IMM GRANULOCYTES # BLD AUTO: 0.1 K/UL (ref 0–0.04)
IMM GRANULOCYTES NFR BLD AUTO: 1 % (ref 0–0.5)
LYMPHOCYTES # BLD: 1.1 K/UL (ref 0.9–3.6)
LYMPHOCYTES NFR BLD: 12 % (ref 21–52)
MCH RBC QN AUTO: 26.7 PG (ref 24–34)
MCHC RBC AUTO-ENTMCNC: 31 G/DL (ref 31–37)
MCV RBC AUTO: 86.3 FL (ref 78–100)
MONOCYTES # BLD: 1.5 K/UL (ref 0.05–1.2)
MONOCYTES NFR BLD: 17 % (ref 3–10)
NEUTS SEG # BLD: 6.1 K/UL (ref 1.8–8)
NEUTS SEG NFR BLD: 69 % (ref 40–73)
NRBC # BLD: 0 K/UL (ref 0–0.01)
NRBC BLD-RTO: 0 PER 100 WBC
NUC 1 HOUR HEART TO CONTRALATERAL RATIO: 1.1
NUC 3 HOUR HEART TO CONTRALATERAL RATIO: 1.1
PLATELET # BLD AUTO: 270 K/UL (ref 135–420)
PMV BLD AUTO: 10.1 FL (ref 9.2–11.8)
POTASSIUM SERPL-SCNC: 3.5 MMOL/L (ref 3.5–5.5)
RBC # BLD AUTO: 2.92 M/UL (ref 4.35–5.65)
SODIUM SERPL-SCNC: 135 MMOL/L (ref 136–145)
WBC # BLD AUTO: 9 K/UL (ref 4.6–13.2)

## 2024-05-08 PROCEDURE — 6360000002 HC RX W HCPCS: Performed by: INTERNAL MEDICINE

## 2024-05-08 PROCEDURE — 36415 COLL VENOUS BLD VENIPUNCTURE: CPT

## 2024-05-08 PROCEDURE — 85025 COMPLETE CBC W/AUTO DIFF WBC: CPT

## 2024-05-08 PROCEDURE — 78803 RP LOCLZJ TUM SPECT 1 AREA: CPT | Performed by: INTERNAL MEDICINE

## 2024-05-08 PROCEDURE — 97110 THERAPEUTIC EXERCISES: CPT

## 2024-05-08 PROCEDURE — 97535 SELF CARE MNGMENT TRAINING: CPT

## 2024-05-08 PROCEDURE — 94761 N-INVAS EAR/PLS OXIMETRY MLT: CPT

## 2024-05-08 PROCEDURE — 99232 SBSQ HOSP IP/OBS MODERATE 35: CPT | Performed by: HOSPITALIST

## 2024-05-08 PROCEDURE — 1100000003 HC PRIVATE W/ TELEMETRY

## 2024-05-08 PROCEDURE — 80048 BASIC METABOLIC PNL TOTAL CA: CPT

## 2024-05-08 PROCEDURE — 6360000002 HC RX W HCPCS: Performed by: HOSPITALIST

## 2024-05-08 PROCEDURE — 97530 THERAPEUTIC ACTIVITIES: CPT

## 2024-05-08 PROCEDURE — 90935 HEMODIALYSIS ONE EVALUATION: CPT

## 2024-05-08 PROCEDURE — 2580000003 HC RX 258: Performed by: HOSPITALIST

## 2024-05-08 PROCEDURE — 6370000000 HC RX 637 (ALT 250 FOR IP): Performed by: HOSPITALIST

## 2024-05-08 RX ADMIN — SODIUM CHLORIDE, PRESERVATIVE FREE 10 ML: 5 INJECTION INTRAVENOUS at 21:02

## 2024-05-08 RX ADMIN — HYDRALAZINE HYDROCHLORIDE 100 MG: 50 TABLET ORAL at 18:28

## 2024-05-08 RX ADMIN — ISOSORBIDE DINITRATE 40 MG: 20 TABLET ORAL at 18:28

## 2024-05-08 RX ADMIN — HYDRALAZINE HYDROCHLORIDE 100 MG: 50 TABLET ORAL at 06:36

## 2024-05-08 RX ADMIN — HEPARIN SODIUM 5000 UNITS: 5000 INJECTION INTRAVENOUS; SUBCUTANEOUS at 18:28

## 2024-05-08 RX ADMIN — EPOETIN ALFA-EPBX 10000 UNITS: 10000 INJECTION, SOLUTION INTRAVENOUS; SUBCUTANEOUS at 18:27

## 2024-05-08 RX ADMIN — BUMETANIDE 2 MG: 0.25 INJECTION INTRAMUSCULAR; INTRAVENOUS at 02:30

## 2024-05-08 RX ADMIN — ACETAMINOPHEN 650 MG: 325 TABLET ORAL at 04:13

## 2024-05-08 RX ADMIN — ISOSORBIDE DINITRATE 40 MG: 20 TABLET ORAL at 21:03

## 2024-05-08 RX ADMIN — SERTRALINE 50 MG: 50 TABLET, FILM COATED ORAL at 09:30

## 2024-05-08 RX ADMIN — Medication 22 MILLICURIE: at 09:00

## 2024-05-08 RX ADMIN — ATORVASTATIN CALCIUM 40 MG: 40 TABLET, FILM COATED ORAL at 21:03

## 2024-05-08 RX ADMIN — FINASTERIDE 5 MG: 5 TABLET, FILM COATED ORAL at 09:30

## 2024-05-08 RX ADMIN — HYDRALAZINE HYDROCHLORIDE 100 MG: 50 TABLET ORAL at 21:03

## 2024-05-08 RX ADMIN — HEPARIN SODIUM 5000 UNITS: 5000 INJECTION INTRAVENOUS; SUBCUTANEOUS at 06:36

## 2024-05-08 RX ADMIN — SODIUM CHLORIDE, PRESERVATIVE FREE 10 ML: 5 INJECTION INTRAVENOUS at 09:30

## 2024-05-08 ASSESSMENT — PAIN SCALES - GENERAL
PAINLEVEL_OUTOF10: 0
PAINLEVEL_OUTOF10: 4

## 2024-05-08 ASSESSMENT — PAIN DESCRIPTION - DESCRIPTORS: DESCRIPTORS: ACHING

## 2024-05-08 ASSESSMENT — PAIN DESCRIPTION - FREQUENCY: FREQUENCY: INTERMITTENT

## 2024-05-08 ASSESSMENT — PAIN DESCRIPTION - LOCATION: LOCATION: KNEE

## 2024-05-08 ASSESSMENT — PAIN DESCRIPTION - ONSET: ONSET: GRADUAL

## 2024-05-08 ASSESSMENT — PAIN DESCRIPTION - ORIENTATION: ORIENTATION: LEFT

## 2024-05-08 NOTE — PROGRESS NOTES
ARU/IPR REFERRAL CONTACT NOTE  [facility]      Thank you for the opportunity to review this patient's case for admission to [facility].    Based on our pre-admission screening:     Referral received: 5/8/2024 time:1335                                 [ x] Our Team/Medical Director is following this case.   Comments:  Referral received. Will review with team.     Addendum 1545: Pt will need updated PT notes. Last PT was 5/4. Pt had been off the floor during previous attempts.       Again, Thank you for this referral. Should you have any questions please do not hesitate to call.     Sincerely,  Ele Mcneill    Clinical Liaison  [facility]  [phone]

## 2024-05-08 NOTE — WOUND CARE
4 Eyes Skin Assessment     NAME:  Amish Shetty  YOB: 1955  MEDICAL RECORD NUMBER:  [id number]    The patient is being assessed for  Shift Handoff    I agree that at least one RN has performed a thorough Head to Toe Skin Assessment on the patient. ALL assessment sites listed below have been assessed.      Areas assessed by both nurses:    Head, Face, Ears, Shoulders, Back, Chest, Arms, Elbows, Hands, Sacrum. Buttock, Coccyx, Ischium, and Legs. Feet and Heels        Does the Patient have a Wound? Yes wound(s) were present on assessment. LDA wound assessment was Initiated and completed by RN       Santino Prevention initiated by RN: Yes  Wound Care Orders initiated by RN: Yes    Pressure Injury (Stage 3,4, Unstageable, DTI, NWPT, and Complex wounds) if present, place Wound referral order by RN under : No, Was examined by wound care nurse. Stage 1, mepilex applied, chg this shift, area of redness blanchable.    New Ostomies, if present place, Ostomy referral order under : No     Nurse 1 eSignature: Electronically signed by JEREMY CARDOSO RN on 5/8/24 at 7:15 AM EDT    **SHARE this note so that the co-signing nurse can place an eSignature**    Nurse 2 eSignature: Electronically signed by Kelechi Patrick RN on 5/8/24 at 7:55 AM EDT

## 2024-05-08 NOTE — PROGRESS NOTES
Physical Therapy  Skilled PT session attempted (1480), pt off the floor for HD. 2nd attempt 1419, off floor for HD. Will continue to follow as schedule allows.    Marcela Cano PT, DPT

## 2024-05-08 NOTE — CARE COORDINATION
05/08/24 1259   /Social Work Whiteboard Notes   /Social Work Whiteboard RED 05/08/2024 When medically stable patient dispo is home with [facility] following. Family to transport. RUBEN Carrion BSW   Case Management

## 2024-05-08 NOTE — CARE COORDINATION
Patient dispo plan has changed. Patient anticipate ARU. ARU is following patient.    Bobbi Carrion BSW   Case Management

## 2024-05-08 NOTE — PROGRESS NOTES
Received pre HD report from  PAYAL Patrick RN.      Pt in bed, A+O x4, on O2 via NC @ 2L, no s/s of distress noted.      Accessed right upper chest CVC w/o complications.  Tx initiated at 1433.      CVC flowing with ease.  For hemodynamic stability UF goal 3500 ml.  Offered assistance with repositioning every 2 hours.  Vascular access visible at all times during treatment, line connections intact at all times.      Tx completed at 1735, tolerated well 3L removed.  De-accessed per protocol.    Heparin indwell 1.9ml in arterial, and 1.8ml in venous catheter.      Unit nurse PAYAL Patrick, FRANSISCA given report.

## 2024-05-08 NOTE — PROGRESS NOTES
[facility] Hospitalist [facility]  Progress Note    Patient: Amish Shetty Age: 68 y.o. : 1955 MR#: [id number] SSN: [id number]      Subjective/24-hour events:     Patient seen and examined on dialysis, he just got back from amyloid scan.  Getting ready to go to dialysis.  He and daughter at bedside states that occupational therapist just left, and she is going to recommend ARU.  They have had issues with home health in the past, patient not getting as much care as they would like.  Patient and daughter hope he can go to rehab.    Patient states he is on oxygen at home, \"as needed.\"      Assessment:     1.  CKD 5, secondary to amyloidosis by biopsy, now ESRD.  OP dialysis chair - Per nephro note: [facility] at 12:30 pm. He needs to come at 12:00 for the first visit.  Consult nutritionist regarding dialysis diet.  Fluid restrictions.  2.  Volume overload.  Fluid restrictions.  Daily dialysis per nephrology.  3.  Hypokalemia replete as needed.  4.  Hypertension  5.  Dyslipidemia  6.  sick sinus syndrome /persistent A-fib- not on anticoagulation other than 81 mg ASA.  Per cardiology notes, patient has declined to be on anticoagulation in the outpatient setting.   7.  Recent Heme + stool w/o overt GI bleeding   8.  Chronic anemia, likely multifactorial - chronic disease/iron deficiency  9.  Chronic hypoxic respiratory failure on home oxygen  10.  CHIOMA on home CPAP  11.  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  Continue aspirin, statin.   12.  Recent diagnosis of renal amyloid.  PYP Study 2024 not suggestive of TTR amyloidosis.   13.  DVT prophylaxis  14.  Full code.  ARU referral initiated.  Daughter at bedside updated with conversation.    Case discussed with:  [x]Patient  [x]Family  [x] Nursing  [x]Case Management on IDT.  DVT Prophylaxis:  []Lovenox  [x]Hep SQ  []SCDs  []Coumadin   []On Heparin gtt []PO anticoagulant    Objective:   VS: /73   Pulse (!) 49   Temp

## 2024-05-08 NOTE — PLAN OF CARE
Problem: Occupational Therapy - Adult  Goal: By Discharge: Performs self-care activities at highest level of function for planned discharge setting.  See evaluation for individualized goals.  Description: Occupational Therapy Goals:  Initiated 5/3/2024 to be met within 7-10 days.    1.  Patient will perform grooming with supervision/set-up while standing at the sink for > 2 min with Good balance.   2.  Patient will perform bathing with supervision/set-up.  3.  Patient will perform lower body dressing with supervision/set-up.  4.  Patient will perform toilet transfers with supervision/set-up.  5.  Patient will perform all aspects of toileting with supervision/set-up.  6.  Patient will participate in upper extremity therapeutic exercise/activities with supervision/set-up for 8 minutes to improve endurance and UB strength needed for ADLs    7.  Patient will utilize energy conservation techniques during functional activities with verbal cues.    PLOF: Pt lives with spouse, reports being Mod Ind for ADLs and functional mobility using QC.  Outcome: Progressing   OCCUPATIONAL THERAPY TREATMENT    Patient: Amish Shetty (68 y.o. male)  Date: 5/8/2024  Diagnosis: Acute pulmonary edema (HCC) [J81.0]  Elevated brain natriuretic peptide (BNP) level [R79.89]  Edema, unspecified type [R60.9]  Stage 4 chronic kidney disease (HCC) [N18.4] Acute pulmonary edema (HCC)      Precautions: Fall Risk    Chart, occupational therapy assessment, plan of care, and goals were reviewed.  ASSESSMENT:  Pt is pleasant and cooperative. Spouse at bedside. Pt motivated to participate w/therapy. Pt reports deconditioning 2/2 4 hospitalizations within 2 months. Pt requires assist w/trunk to maneuver to EOB. Reviewed importance of UE Therex and SROM w/RUE. Pt participates in dynamic standing balance activity for carryover w/LE ADLs and toileting ADL. Educated on compensatory strategies w/ADL grooming tasks. No c/o pain. Pt anxious for return to PLOF.  Therapeutic Exercises/Activity:   AAROM LUE shoulder flexion  AAROM RUE elbow flexion/extension  AROM LUE elbow flexion/extension  PROM RUE wrist flexion/extension w/prolonged stretch    Pt demonstrates fair dynamic standing balance hitting target in multiple planes w/LUE. Pt requires vc's for widen TISHA for improved dynamic standing balance.     Pain:  Intensity Pre-treatment: 0/10   Intensity Post-treatment: 0/10    Activity Tolerance:    Good    After treatment:   []  Patient left in no apparent distress sitting up in chair  [x]  Patient left in no apparent distress seated EOB  [x]  Call bell left within reach  []  Nursing notified  []  Caregiver present  []  Bed alarm activated    COMMUNICATION/EDUCATION:   Patient Education  Education Given To: Patient;Family  Education Provided: Plan of Care;ADL Adaptive Strategies  Education Method: Verbal;Teach Back  Barriers to Learning: None  Education Outcome: Continued education needed    Thank you for this referral.  DESMOND Vital  Minutes: 43

## 2024-05-08 NOTE — PLAN OF CARE
INTERVENTION:  HEMODYNAMIC STABILIZATION  MAINTAIN BP WNL WHILE ON HD.    INTERVENTION:  FLUID MANAGEMENT  WILL ATTEMPT 3500 ML TOTAL FLUID REMOVAL AS TOLERATED.    INTERVENTION:  METABOLIC/ELECTROLYTE MANAGEMENT  3.5 POTASSIUM 2.5 CALCIUM DIALYSATE USED WITH HD TODAY.    INTERVENTION:  HEMODIALYSIS ACCESS SITE MANAGEMENT  RIGHT UPPER CHEST CVC ACCESSED USING ASEPTIC TECHNIQUE.    GOAL:  SIGNS AND SYMPTOMS OF LISTED POTENTIAL PROBLEMS WILL BE ABSENT OR MANAGEABLE.    OUTCOME:  PROGRESSING.    HD PLANNED FOR 3 HOURS TODAY.        Problem: Chronic Conditions and Co-morbidities  Goal: Patient's chronic conditions and co-morbidity symptoms are monitored and maintained or improved  Outcome: Progressing  Flowsheets (Taken 5/8/2024 1432)  Care Plan - Patient's Chronic Conditions and Co-Morbidity Symptoms are Monitored and Maintained or Improved:   Monitor and assess patient's chronic conditions and comorbid symptoms for stability, deterioration, or improvement   Collaborate with multidisciplinary team to address chronic and comorbid conditions and prevent exacerbation or deterioration   Update acute care plan with appropriate goals if chronic or comorbid symptoms are exacerbated and prevent overall improvement and discharge

## 2024-05-09 ENCOUNTER — HOSPITAL ENCOUNTER (INPATIENT)
Facility: HOSPITAL | Age: 69
DRG: 947 | End: 2024-05-09
Attending: EMERGENCY MEDICINE | Admitting: EMERGENCY MEDICINE
Payer: MEDICARE

## 2024-05-09 VITALS
HEART RATE: 55 BPM | HEIGHT: 69 IN | SYSTOLIC BLOOD PRESSURE: 127 MMHG | OXYGEN SATURATION: 98 % | WEIGHT: 194.89 LBS | BODY MASS INDEX: 28.87 KG/M2 | RESPIRATION RATE: 18 BRPM | TEMPERATURE: 97.9 F | DIASTOLIC BLOOD PRESSURE: 72 MMHG

## 2024-05-09 PROBLEM — R53.81 DEBILITY: Status: ACTIVE | Noted: 2024-05-09

## 2024-05-09 LAB
ANION GAP SERPL CALC-SCNC: 5 MMOL/L (ref 3–18)
BASOPHILS # BLD: 0.1 K/UL (ref 0–0.1)
BASOPHILS NFR BLD: 1 % (ref 0–2)
BUN SERPL-MCNC: 33 MG/DL (ref 7–18)
BUN/CREAT SERPL: 13 (ref 12–20)
CALCIUM SERPL-MCNC: 8.5 MG/DL (ref 8.5–10.1)
CHLORIDE SERPL-SCNC: 102 MMOL/L (ref 100–111)
CO2 SERPL-SCNC: 27 MMOL/L (ref 21–32)
CREAT SERPL-MCNC: 2.49 MG/DL (ref 0.6–1.3)
DIFFERENTIAL METHOD BLD: ABNORMAL
EOSINOPHIL # BLD: 0.2 K/UL (ref 0–0.4)
EOSINOPHIL NFR BLD: 3 % (ref 0–5)
ERYTHROCYTE [DISTWIDTH] IN BLOOD BY AUTOMATED COUNT: 17.8 % (ref 11.6–14.5)
GLUCOSE SERPL-MCNC: 88 MG/DL (ref 74–99)
HCT VFR BLD AUTO: 27.5 % (ref 36–48)
HGB BLD-MCNC: 8.1 G/DL (ref 13–16)
IMM GRANULOCYTES # BLD AUTO: 0.1 K/UL (ref 0–0.04)
IMM GRANULOCYTES NFR BLD AUTO: 1 % (ref 0–0.5)
LYMPHOCYTES # BLD: 1.2 K/UL (ref 0.9–3.6)
LYMPHOCYTES NFR BLD: 14 % (ref 21–52)
MCH RBC QN AUTO: 25.8 PG (ref 24–34)
MCHC RBC AUTO-ENTMCNC: 29.5 G/DL (ref 31–37)
MCV RBC AUTO: 87.6 FL (ref 78–100)
MONOCYTES # BLD: 1.2 K/UL (ref 0.05–1.2)
MONOCYTES NFR BLD: 14 % (ref 3–10)
NEUTS SEG # BLD: 5.9 K/UL (ref 1.8–8)
NEUTS SEG NFR BLD: 69 % (ref 40–73)
NRBC # BLD: 0 K/UL (ref 0–0.01)
NRBC BLD-RTO: 0 PER 100 WBC
PLATELET # BLD AUTO: 306 K/UL (ref 135–420)
PMV BLD AUTO: 10.3 FL (ref 9.2–11.8)
POTASSIUM SERPL-SCNC: 3.8 MMOL/L (ref 3.5–5.5)
RBC # BLD AUTO: 3.14 M/UL (ref 4.35–5.65)
SODIUM SERPL-SCNC: 134 MMOL/L (ref 136–145)
WBC # BLD AUTO: 8.6 K/UL (ref 4.6–13.2)

## 2024-05-09 PROCEDURE — 6360000002 HC RX W HCPCS: Performed by: HOSPITALIST

## 2024-05-09 PROCEDURE — 6370000000 HC RX 637 (ALT 250 FOR IP): Performed by: HOSPITALIST

## 2024-05-09 PROCEDURE — 97110 THERAPEUTIC EXERCISES: CPT

## 2024-05-09 PROCEDURE — 97116 GAIT TRAINING THERAPY: CPT

## 2024-05-09 PROCEDURE — 85025 COMPLETE CBC W/AUTO DIFF WBC: CPT

## 2024-05-09 PROCEDURE — 1180000000 HC REHAB R&B

## 2024-05-09 PROCEDURE — 99239 HOSP IP/OBS DSCHRG MGMT >30: CPT | Performed by: HOSPITALIST

## 2024-05-09 PROCEDURE — 80048 BASIC METABOLIC PNL TOTAL CA: CPT

## 2024-05-09 PROCEDURE — 94761 N-INVAS EAR/PLS OXIMETRY MLT: CPT

## 2024-05-09 PROCEDURE — 6360000002 HC RX W HCPCS: Performed by: EMERGENCY MEDICINE

## 2024-05-09 PROCEDURE — 6370000000 HC RX 637 (ALT 250 FOR IP): Performed by: INTERNAL MEDICINE

## 2024-05-09 PROCEDURE — 6370000000 HC RX 637 (ALT 250 FOR IP): Performed by: EMERGENCY MEDICINE

## 2024-05-09 PROCEDURE — 36415 COLL VENOUS BLD VENIPUNCTURE: CPT

## 2024-05-09 PROCEDURE — 2700000000 HC OXYGEN THERAPY PER DAY

## 2024-05-09 PROCEDURE — 2580000003 HC RX 258: Performed by: HOSPITALIST

## 2024-05-09 RX ORDER — ISOSORBIDE DINITRATE 20 MG/1
40 TABLET ORAL 3 TIMES DAILY
Status: DISPENSED | OUTPATIENT
Start: 2024-05-09

## 2024-05-09 RX ORDER — FLUTICASONE PROPIONATE 50 MCG
2 SPRAY, SUSPENSION (ML) NASAL DAILY
Status: DISPENSED | OUTPATIENT
Start: 2024-05-09

## 2024-05-09 RX ORDER — ACETAMINOPHEN 325 MG/1
650 TABLET ORAL EVERY 4 HOURS PRN
Status: ACTIVE | OUTPATIENT
Start: 2024-05-09

## 2024-05-09 RX ORDER — FOLIC ACID/VIT B COMPLEX AND C 0.8 MG
1 TABLET ORAL DAILY
Refills: 0 | Status: ON HOLD | DISCHARGE
Start: 2024-05-09

## 2024-05-09 RX ORDER — ASPIRIN 81 MG/1
81 TABLET, CHEWABLE ORAL
Status: DISPENSED | OUTPATIENT
Start: 2024-05-10

## 2024-05-09 RX ORDER — POLYETHYLENE GLYCOL 3350 17 G/17G
17 POWDER, FOR SOLUTION ORAL DAILY PRN
Status: ACTIVE | OUTPATIENT
Start: 2024-05-09

## 2024-05-09 RX ORDER — FERROUS SULFATE 325(65) MG
325 TABLET ORAL
Status: DISPENSED | OUTPATIENT
Start: 2024-05-10

## 2024-05-09 RX ORDER — LOSARTAN POTASSIUM 100 MG/1
100 TABLET ORAL DAILY
Qty: 30 TABLET | Refills: 0 | Status: ON HOLD | DISCHARGE
Start: 2024-05-09

## 2024-05-09 RX ORDER — ATORVASTATIN CALCIUM 40 MG/1
40 TABLET, FILM COATED ORAL NIGHTLY
Status: DISPENSED | OUTPATIENT
Start: 2024-05-09

## 2024-05-09 RX ORDER — ATORVASTATIN CALCIUM 40 MG/1
40 TABLET, FILM COATED ORAL NIGHTLY
Qty: 30 TABLET | Refills: 0 | Status: ON HOLD | DISCHARGE
Start: 2024-05-09

## 2024-05-09 RX ORDER — NEPHROCAP 1 MG
1 CAP ORAL DAILY
Status: DISPENSED | OUTPATIENT
Start: 2024-05-09

## 2024-05-09 RX ORDER — BUMETANIDE 1 MG/1
2 TABLET ORAL 2 TIMES DAILY
Status: DISCONTINUED | OUTPATIENT
Start: 2024-05-09 | End: 2024-05-09 | Stop reason: HOSPADM

## 2024-05-09 RX ORDER — FINASTERIDE 5 MG/1
5 TABLET, FILM COATED ORAL DAILY
Status: DISPENSED | OUTPATIENT
Start: 2024-05-10

## 2024-05-09 RX ORDER — BUMETANIDE 1 MG/1
2 TABLET ORAL 2 TIMES DAILY
Status: DISPENSED | OUTPATIENT
Start: 2024-05-09

## 2024-05-09 RX ORDER — LOSARTAN POTASSIUM 50 MG/1
100 TABLET ORAL DAILY
Status: DISPENSED | OUTPATIENT
Start: 2024-05-10

## 2024-05-09 RX ORDER — HYDRALAZINE HYDROCHLORIDE 50 MG/1
100 TABLET, FILM COATED ORAL EVERY 8 HOURS SCHEDULED
Status: DISPENSED | OUTPATIENT
Start: 2024-05-09

## 2024-05-09 RX ORDER — BUMETANIDE 2 MG/1
2 TABLET ORAL 2 TIMES DAILY
Qty: 60 TABLET | Refills: 0 | Status: ON HOLD | DISCHARGE
Start: 2024-05-09

## 2024-05-09 RX ORDER — HEPARIN SODIUM 5000 [USP'U]/ML
5000 INJECTION, SOLUTION INTRAVENOUS; SUBCUTANEOUS EVERY 8 HOURS SCHEDULED
Status: DISPENSED | OUTPATIENT
Start: 2024-05-09

## 2024-05-09 RX ADMIN — LOSARTAN POTASSIUM 100 MG: 50 TABLET, FILM COATED ORAL at 09:32

## 2024-05-09 RX ADMIN — HYDRALAZINE HYDROCHLORIDE 100 MG: 50 TABLET ORAL at 21:16

## 2024-05-09 RX ADMIN — ISOSORBIDE DINITRATE 40 MG: 20 TABLET ORAL at 14:02

## 2024-05-09 RX ADMIN — ATORVASTATIN CALCIUM 40 MG: 40 TABLET, FILM COATED ORAL at 21:16

## 2024-05-09 RX ADMIN — HEPARIN SODIUM 5000 UNITS: 5000 INJECTION INTRAVENOUS; SUBCUTANEOUS at 21:16

## 2024-05-09 RX ADMIN — ISOSORBIDE DINITRATE 40 MG: 20 TABLET ORAL at 09:32

## 2024-05-09 RX ADMIN — BUMETANIDE 2 MG: 1 TABLET ORAL at 11:10

## 2024-05-09 RX ADMIN — HEPARIN SODIUM 5000 UNITS: 5000 INJECTION INTRAVENOUS; SUBCUTANEOUS at 06:19

## 2024-05-09 RX ADMIN — SODIUM CHLORIDE, PRESERVATIVE FREE 10 ML: 5 INJECTION INTRAVENOUS at 09:33

## 2024-05-09 RX ADMIN — ISOSORBIDE DINITRATE 40 MG: 20 TABLET ORAL at 21:16

## 2024-05-09 RX ADMIN — HYDRALAZINE HYDROCHLORIDE 100 MG: 50 TABLET ORAL at 06:19

## 2024-05-09 RX ADMIN — BUMETANIDE 2 MG: 1 TABLET ORAL at 17:52

## 2024-05-09 RX ADMIN — FINASTERIDE 5 MG: 5 TABLET, FILM COATED ORAL at 09:32

## 2024-05-09 RX ADMIN — FLUTICASONE PROPIONATE 2 SPRAY: 50 SPRAY, METERED NASAL at 17:54

## 2024-05-09 RX ADMIN — Medication 1 MG: at 21:15

## 2024-05-09 RX ADMIN — HEPARIN SODIUM 5000 UNITS: 5000 INJECTION INTRAVENOUS; SUBCUTANEOUS at 14:03

## 2024-05-09 RX ADMIN — SERTRALINE 50 MG: 50 TABLET, FILM COATED ORAL at 09:32

## 2024-05-09 RX ADMIN — HYDRALAZINE HYDROCHLORIDE 100 MG: 50 TABLET ORAL at 14:03

## 2024-05-09 ASSESSMENT — PAIN SCALES - WONG BAKER: WONGBAKER_NUMERICALRESPONSE: NO HURT

## 2024-05-09 ASSESSMENT — PAIN SCALES - GENERAL
PAINLEVEL_OUTOF10: 0

## 2024-05-09 NOTE — DISCHARGE SUMMARY
Discharge Summary    Patient: Amish Shetty               Sex: male          DOA: 5/2/2024         YOB: 1955      Age:  68 y.o.        LOS:  LOS: 7 days                Admit Date: 5/2/2024    Discharge Date: 5/9/2024    Admission Diagnoses: Acute pulmonary edema (HCC) [J81.0]  Elevated brain natriuretic peptide (BNP) level [R79.89]  Edema, unspecified type [R60.9]  Stage 4 chronic kidney disease (HCC) [N18.4]    Reason for admission:     68 y.o. male with past medical hx of CKD, nephrotic syndrome, renal amyloidosis, ischemic stroke with right-sided weakness, hypertension, diabetes presented to the ED 5/2/2024 with progressively worsening shortness of breath, started 2 days prior.  Patient endorsed dyspnea on exertion.  Hide associated fever sweats, chest pain.  Patient was admitted to our facility about 3 weeks ago, and underwent emergent dialysis at that time.  Patient stated he saw Dr. Papito Ramírez in clinic the day prior to presentation, and was informed that the plan was for him to start hemodialysis soon..  Patient woke up that morning with significantly worsened shortness of breath, so he presented to the ED.  In the ED, creatinine 2.81, BUN 37.  BNP 16,330.  Potassium 3.2. Blood pressure noted elevated, as high as 168/103.  Chest x-ray with concern for pulmonary edema.  Patient was taken to interventional radiology, where image guided tunneled dialysis catheter was placed this afternoon.  Patient then underwent dialysis, and was transferred to  S for admission.  On 2 S., patient reported he was hungry and requesting food.  Denied chest pain, fever, nausea or vomiting.  Reported increasing use of his home oxygen, 2 L as needed.  At home, he has a concentrator and portable tanks.  Stated he felt better since dialysis.  He requested frequent visits from physical therapy, as he became very weak and during recent hospital stay, stating he was here for 9 days and became deconditioned.    Lab Results   Component Value Date    WBC 8.6 05/09/2024    HGB 8.1 (L) 05/09/2024    HCT 27.5 (L) 05/09/2024    MCV 87.6 05/09/2024     05/09/2024     Lab Results   Component Value Date/Time     05/09/2024 04:04 AM    K 3.8 05/09/2024 04:04 AM     05/09/2024 04:04 AM    CO2 27 05/09/2024 04:04 AM    BUN 33 05/09/2024 04:04 AM    CREATININE 2.49 05/09/2024 04:04 AM    GLUCOSE 88 05/09/2024 04:04 AM    CALCIUM 8.5 05/09/2024 04:04 AM    LABGLOM 27 05/09/2024 04:04 AM    LABGLOM 27 04/17/2024 07:07 AM        IMAGING  Xray Result (most recent):  XR CHEST PORTABLE 05/02/2024    Narrative  EXAM: XR CHEST PORTABLE    CLINICAL INDICATION/HISTORY: shortness of breath    TECHNIQUE: Portable AP view of the chest    COMPARISON: Chest x-ray dated 4/12/2024    FINDINGS:    Cardiac: Cardiac silhouette is borderline enlarged.    LUNGS: Hazy opacities in the lung bases and likely trace right and small left  pleural effusions. No pneumothorax.    Bones: Unremarkable.    Impression  Hazy opacities in the lung bases and likely trace right and small left pleural  effusions may represent infiltrates versus edema.      PYP Study 5/8/2024:   The study is not suggestive of ATTR amyloidosis.     Encounter Date: 05/02/24   EKG 12 Lead   Result Value    Ventricular Rate 59    Atrial Rate 54    QRS Duration 108    Q-T Interval 488    QTc Calculation (Bazett) 483    R Axis 35    T Axis 248    Diagnosis      Atrial fibrillation with slow ventricular response with premature ventricular   or aberrantly conducted complexes  Low voltage QRS  Cannot rule out Anterior infarct (cited on or before 25-FEB-2020)  Abnormal ECG    Confirmed by MD Devaughn, Davy ([id number]) on 5/2/2024 5:32:06 PM           Discharge Medications:     Current Discharge Medication List        START taking these medications    Details   bumetanide (BUMEX) 2 MG tablet Take 1 tablet by mouth in the morning and 1 tablet in the evening.  Qty: 60 tablet, Refills: 0

## 2024-05-09 NOTE — PROGRESS NOTES
ARU/IPR REFERRAL CONTACT NOTE  [facility]      Thank you for the opportunity to review this patient's case for admission to [facility].    Based on our pre-admission screening:     [ x] This patient meets criteria for admission to [facility].    Plan to admit patient to ARU room 182 @2pm  Please call report to [phone] prior to transfer.  Appreciate completed discharge summary in the chart prior to transfer.     Again, Thank you for this referral. Should you have any questions please do not hesitate to call.     Sincerely,  Ele Mcneill    Clinical Liaison  [facility]  [phone]

## 2024-05-09 NOTE — PROGRESS NOTES
4 Eyes Skin Assessment     NAME:  Amish Shetty  YOB: 1955  MEDICAL RECORD NUMBER:  [id number]    The patient is being assessed for  Shift Handoff    I agree that at least one RN has performed a thorough Head to Toe Skin Assessment on the patient. ALL assessment sites listed below have been assessed.      Areas assessed by both nurses:    Head, Face, Ears, Shoulders, Back, Chest, Arms, Elbows, Hands, Sacrum. Buttock, Coccyx, Ischium, and Legs. Feet and Heels        Does the Patient have a Wound? Yes wound(s) were present on assessment. LDA wound assessment was Initiated and completed by RN       Santino Prevention initiated by RN: No  Wound Care Orders initiated by RN: No    Pressure Injury (Stage 3,4, Unstageable, DTI, NWPT, and Complex wounds) if present, place Wound referral order by RN under : No    New Ostomies, if present place, Ostomy referral order under : No     Nurse 1 eSignature: Electronically signed by Kelechi Patrick RN on 5/8/24 at 8:14 PM EDT    **SHARE this note so that the co-signing nurse can place an eSignature**    Nurse 2 eSignature: Electronically signed by LATOYA SOSA RN on 5/8/24 at 8:16 PM EDT

## 2024-05-09 NOTE — PROGRESS NOTES
4 Eyes Skin Assessment     NAME:  Amish Shetty  YOB: 1955  MEDICAL RECORD NUMBER:  [id number]    The patient is being assessed for  Shift Handoff    I agree that at least one RN has performed a thorough Head to Toe Skin Assessment on the patient. ALL assessment sites listed below have been assessed.      Areas assessed by both nurses:    Head, Face, Ears, Shoulders, Back, Chest, Arms, Elbows, Hands, Sacrum. Buttock, Coccyx, Ischium, Legs. Feet and Heels, and Under Medical Devices         Does the Patient have a Wound? No noted wound(s)       Santino Prevention initiated by RN: No  Wound Care Orders initiated by RN: No    Pressure Injury (Stage 3,4, Unstageable, DTI, NWPT, and Complex wounds) if present, place Wound referral order by RN under : No    New Ostomies, if present place, Ostomy referral order under : No     Nurse 1 eSignature: Electronically signed by LATOYA SOSA RN on 5/9/24 at 7:06 AM EDT    **SHARE this note so that the co-signing nurse can place an eSignature**    Nurse 2 eSignature: Electronically signed by Kelechi Patrick RN on 5/9/24 at 7:39 AM EDT

## 2024-05-09 NOTE — PLAN OF CARE
Problem: Discharge Planning  Goal: Discharge to home or other facility with appropriate resources  Outcome: Progressing     Problem: Skin/Tissue Integrity  Goal: Absence of new skin breakdown  Description: 1.  Monitor for areas of redness and/or skin breakdown  2.  Assess vascular access sites hourly  3.  Every 4-6 hours minimum:  Change oxygen saturation probe site  4.  Every 4-6 hours:  If on nasal continuous positive airway pressure, respiratory therapy assess nares and determine need for appliance change or resting period.  Outcome: Progressing     Problem: Safety - Adult  Goal: Free from fall injury  Outcome: Progressing     Problem: ABCDS Injury Assessment  Goal: Absence of physical injury  Outcome: Progressing     Problem: Pain  Goal: Verbalizes/displays adequate comfort level or baseline comfort level  Outcome: Progressing     Problem: Chronic Conditions and Co-morbidities  Goal: Patient's chronic conditions and co-morbidity symptoms are monitored and maintained or improved  5/8/2024 2234 by Cristin Gray, RN  Outcome: Progressing  Flowsheets (Taken 5/8/2024 2000)  Care Plan - Patient's Chronic Conditions and Co-Morbidity Symptoms are Monitored and Maintained or Improved: Monitor and assess patient's chronic conditions and comorbid symptoms for stability, deterioration, or improvement  5/8/2024 1432 by Waleska Farley, RN  Outcome: Progressing  Flowsheets (Taken 5/8/2024 1432)  Care Plan - Patient's Chronic Conditions and Co-Morbidity Symptoms are Monitored and Maintained or Improved:   Monitor and assess patient's chronic conditions and comorbid symptoms for stability, deterioration, or improvement   Collaborate with multidisciplinary team to address chronic and comorbid conditions and prevent exacerbation or deterioration   Update acute care plan with appropriate goals if chronic or comorbid symptoms are exacerbated and prevent overall improvement and discharge     Problem: Occupational Therapy -  Adult  Goal: By Discharge: Performs self-care activities at highest level of function for planned discharge setting.  See evaluation for individualized goals.  Description: Occupational Therapy Goals:  Initiated 5/3/2024 to be met within 7-10 days.    1.  Patient will perform grooming with supervision/set-up while standing at the sink for > 2 min with Good balance.   2.  Patient will perform bathing with supervision/set-up.  3.  Patient will perform lower body dressing with supervision/set-up.  4.  Patient will perform toilet transfers with supervision/set-up.  5.  Patient will perform all aspects of toileting with supervision/set-up.  6.  Patient will participate in upper extremity therapeutic exercise/activities with supervision/set-up for 8 minutes to improve endurance and UB strength needed for ADLs    7.  Patient will utilize energy conservation techniques during functional activities with verbal cues.    PLOF: Pt lives with spouse, reports being Mod Ind for ADLs and functional mobility using QC.  5/8/2024 1318 by hCelita Cruz OTA  Outcome: Progressing

## 2024-05-09 NOTE — PROGRESS NOTES
Physician Progress Note      PATIENT:               VIC ZAMORANO  CSN #:                  [id number]  :                       1955  ADMIT DATE:       2024 9:44 AM  DISCH DATE:        2024 2:33 PM  RESPONDING  PROVIDER #:        Cristin Hernandez MD          QUERY TEXT:    Pt admitted with fluid overload, CKD4 and has History of CHF documented. If   possible, please document in progress notes and discharge summary further   specificity regarding the type and acuity of CHF:    The medical record reflects the following:  Risk Factors: HTN, CKD5, Stroke  Clinical Indicators: noted history of CHF, Echo : EF 45-50% with normal   systolic function, proBNP 35849, 3+ pitting edema  Treatment: IR placed HD catheter and HD started for fluid removal.  Options provided:  -- Acute on Chronic Systolic CHF/HFrEF  -- Acute on Chronic Diastolic CHF/HFpEF  -- Acute on Chronic Systolic and Diastolic CHF  -- Chronic Systolic CHF/HFrEF  -- Chronic Diastolic CHF/HFpEF  -- Chronic Systolic and Diastolic CHF  -- Other - I will add my own diagnosis  -- Disagree - Not applicable / Not valid  -- Disagree - Clinically unable to determine / Unknown  -- Refer to Clinical Documentation Reviewer    PROVIDER RESPONSE TEXT:    This patient has chronic systolic CHF/HFrEF.    Query created by: Chio Brooks on 2024 9:06 AM      Electronically signed by:  Cristin Hernandez MD 2024 4:41 PM

## 2024-05-09 NOTE — PROGRESS NOTES
Amish Shetty is a 68 y.o.  male admitted on 5/9/2024 from  [address]. Report received from FRANSISCA Lorenz @ 9285(time). Transportation was provided, and the patient was transferred to his room via  Bed . Patient was assisted to bed with assist of 3. The patient was oriented to his room. Fall risk precautions were discussed with the patient; he was instructed to call for help prior to getting up. The following fall risk precautions were initiated: bed/ chair alarms, door signage, yellow bracelet and socks. Four eyes nurse skin assessment was performed by FRANSISCA Magallon  and  FRANSISCA Whelan. Skin problems noted: Yes (Stage 2 Sacral & scabs on right lower leg).    Sherita Granados RN BSN

## 2024-05-09 NOTE — PROGRESS NOTES
RENAL DAILY PROGRESS NOTE              Subjective:       Complaint: breathing is much better  Overnight events noted  no nausea, vomiting, chest pain       IMPRESSION:   Fluid overload in setting of CKD stage 4 and poor response to diuretics,started on dialysis.  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  Hypertension  Hypokalemia  Proteinuria  Hx diabetes  Hx CVA  Anemia,followed by hematology as OP  Hx iron deficiency   PLAN:   HD again tomorrow. Had back to back dialysis till yesterday and looks compensated now.   Makes urine. On bumex 2 mg po bid. Monitor  Is s/p v venofer.  Has a chair at [facility] at 12:30 pm. He needs to come at 12:00 for the first visit. Might go to ARU first.  Amyloid scan neg for cardiac involvement.           Current Facility-Administered Medications   Medication Dose Route Frequency    [Held by provider] bumetanide (BUMEX) injection 2 mg  2 mg IntraVENous q8h    losartan (COZAAR) tablet 100 mg  100 mg Oral Daily    epoetin ole-epbx (RETACRIT) injection 10,000 Units  10,000 Units SubCUTAneous Once per day on Mon Wed Fri    atorvastatin (LIPITOR) tablet 40 mg  40 mg Oral Nightly    finasteride (PROSCAR) tablet 5 mg  5 mg Oral Daily    hydrALAZINE (APRESOLINE) tablet 100 mg  100 mg Oral 3 times per day    isosorbide dinitrate (ISORDIL) tablet 40 mg  40 mg Oral TID    sertraline (ZOLOFT) tablet 50 mg  50 mg Oral Daily    sodium chloride flush 0.9 % injection 5-40 mL  5-40 mL IntraVENous 2 times per day    sodium chloride flush 0.9 % injection 5-40 mL  5-40 mL IntraVENous PRN    0.9 % sodium chloride infusion   IntraVENous PRN    ondansetron (ZOFRAN-ODT) disintegrating tablet 4 mg  4 mg Oral Q8H PRN    Or    ondansetron (ZOFRAN) injection 4 mg  4 mg IntraVENous Q6H PRN    polyethylene glycol (GLYCOLAX) packet 17 g  17 g Oral Daily PRN    acetaminophen

## 2024-05-10 LAB
ANION GAP SERPL CALC-SCNC: 7 MMOL/L (ref 3–18)
BASOPHILS # BLD: 0.1 K/UL (ref 0–0.1)
BASOPHILS NFR BLD: 1 % (ref 0–2)
BUN SERPL-MCNC: 55 MG/DL (ref 7–18)
BUN/CREAT SERPL: 16 (ref 12–20)
CALCIUM SERPL-MCNC: 8.4 MG/DL (ref 8.5–10.1)
CHLORIDE SERPL-SCNC: 99 MMOL/L (ref 100–111)
CO2 SERPL-SCNC: 28 MMOL/L (ref 21–32)
CREAT SERPL-MCNC: 3.45 MG/DL (ref 0.6–1.3)
DIFFERENTIAL METHOD BLD: ABNORMAL
EOSINOPHIL # BLD: 0.3 K/UL (ref 0–0.4)
EOSINOPHIL NFR BLD: 4 % (ref 0–5)
ERYTHROCYTE [DISTWIDTH] IN BLOOD BY AUTOMATED COUNT: 17.5 % (ref 11.6–14.5)
GLUCOSE SERPL-MCNC: 88 MG/DL (ref 74–99)
HCT VFR BLD AUTO: 27 % (ref 36–48)
HGB BLD-MCNC: 8.2 G/DL (ref 13–16)
IMM GRANULOCYTES # BLD AUTO: 0 K/UL (ref 0–0.04)
IMM GRANULOCYTES NFR BLD AUTO: 0 % (ref 0–0.5)
LYMPHOCYTES # BLD: 1.3 K/UL (ref 0.9–3.6)
LYMPHOCYTES NFR BLD: 15 % (ref 21–52)
MAGNESIUM SERPL-MCNC: 2.3 MG/DL (ref 1.6–2.6)
MCH RBC QN AUTO: 26.1 PG (ref 24–34)
MCHC RBC AUTO-ENTMCNC: 30.4 G/DL (ref 31–37)
MCV RBC AUTO: 86 FL (ref 78–100)
MONOCYTES # BLD: 1.1 K/UL (ref 0.05–1.2)
MONOCYTES NFR BLD: 13 % (ref 3–10)
NEUTS SEG # BLD: 5.6 K/UL (ref 1.8–8)
NEUTS SEG NFR BLD: 67 % (ref 40–73)
NRBC # BLD: 0 K/UL (ref 0–0.01)
NRBC BLD-RTO: 0 PER 100 WBC
PLATELET # BLD AUTO: 297 K/UL (ref 135–420)
PMV BLD AUTO: 10.1 FL (ref 9.2–11.8)
POTASSIUM SERPL-SCNC: 3.7 MMOL/L (ref 3.5–5.5)
RBC # BLD AUTO: 3.14 M/UL (ref 4.35–5.65)
SODIUM SERPL-SCNC: 134 MMOL/L (ref 136–145)
WBC # BLD AUTO: 8.4 K/UL (ref 4.6–13.2)

## 2024-05-10 PROCEDURE — 97162 PT EVAL MOD COMPLEX 30 MIN: CPT

## 2024-05-10 PROCEDURE — 97116 GAIT TRAINING THERAPY: CPT

## 2024-05-10 PROCEDURE — 97110 THERAPEUTIC EXERCISES: CPT

## 2024-05-10 PROCEDURE — 36415 COLL VENOUS BLD VENIPUNCTURE: CPT

## 2024-05-10 PROCEDURE — 97166 OT EVAL MOD COMPLEX 45 MIN: CPT

## 2024-05-10 PROCEDURE — 6370000000 HC RX 637 (ALT 250 FOR IP): Performed by: EMERGENCY MEDICINE

## 2024-05-10 PROCEDURE — 6360000002 HC RX W HCPCS: Performed by: EMERGENCY MEDICINE

## 2024-05-10 PROCEDURE — 90935 HEMODIALYSIS ONE EVALUATION: CPT

## 2024-05-10 PROCEDURE — 97535 SELF CARE MNGMENT TRAINING: CPT

## 2024-05-10 PROCEDURE — 97542 WHEELCHAIR MNGMENT TRAINING: CPT

## 2024-05-10 PROCEDURE — 97112 NEUROMUSCULAR REEDUCATION: CPT

## 2024-05-10 PROCEDURE — 80048 BASIC METABOLIC PNL TOTAL CA: CPT

## 2024-05-10 PROCEDURE — 5A1D70Z PERFORMANCE OF URINARY FILTRATION, INTERMITTENT, LESS THAN 6 HOURS PER DAY: ICD-10-PCS | Performed by: INTERNAL MEDICINE

## 2024-05-10 PROCEDURE — 83735 ASSAY OF MAGNESIUM: CPT

## 2024-05-10 PROCEDURE — 99223 1ST HOSP IP/OBS HIGH 75: CPT | Performed by: EMERGENCY MEDICINE

## 2024-05-10 PROCEDURE — 85025 COMPLETE CBC W/AUTO DIFF WBC: CPT

## 2024-05-10 PROCEDURE — 6360000002 HC RX W HCPCS: Performed by: INTERNAL MEDICINE

## 2024-05-10 PROCEDURE — 1180000000 HC REHAB R&B

## 2024-05-10 PROCEDURE — 97530 THERAPEUTIC ACTIVITIES: CPT

## 2024-05-10 RX ORDER — HYDRALAZINE HYDROCHLORIDE 10 MG/1
10 TABLET, FILM COATED ORAL EVERY 6 HOURS PRN
Status: DISPENSED | OUTPATIENT
Start: 2024-05-10

## 2024-05-10 RX ORDER — HEPARIN SODIUM 1000 [USP'U]/ML
3800 INJECTION, SOLUTION INTRAVENOUS; SUBCUTANEOUS PRN
Status: ACTIVE | OUTPATIENT
Start: 2024-05-10

## 2024-05-10 RX ADMIN — FERROUS SULFATE TAB 325 MG (65 MG ELEMENTAL FE) 325 MG: 325 (65 FE) TAB at 09:24

## 2024-05-10 RX ADMIN — ISOSORBIDE DINITRATE 40 MG: 20 TABLET ORAL at 21:53

## 2024-05-10 RX ADMIN — ISOSORBIDE DINITRATE 40 MG: 20 TABLET ORAL at 18:31

## 2024-05-10 RX ADMIN — LOSARTAN POTASSIUM 100 MG: 50 TABLET, FILM COATED ORAL at 09:23

## 2024-05-10 RX ADMIN — BUMETANIDE 2 MG: 1 TABLET ORAL at 09:24

## 2024-05-10 RX ADMIN — BUMETANIDE 2 MG: 1 TABLET ORAL at 18:32

## 2024-05-10 RX ADMIN — EPOETIN ALFA-EPBX 6000 UNITS: 3000 INJECTION, SOLUTION INTRAVENOUS; SUBCUTANEOUS at 18:33

## 2024-05-10 RX ADMIN — Medication 1 MG: at 09:25

## 2024-05-10 RX ADMIN — ATORVASTATIN CALCIUM 40 MG: 40 TABLET, FILM COATED ORAL at 21:53

## 2024-05-10 RX ADMIN — HEPARIN SODIUM 5000 UNITS: 5000 INJECTION INTRAVENOUS; SUBCUTANEOUS at 18:33

## 2024-05-10 RX ADMIN — ISOSORBIDE DINITRATE 40 MG: 20 TABLET ORAL at 09:23

## 2024-05-10 RX ADMIN — HYDRALAZINE HYDROCHLORIDE 100 MG: 50 TABLET ORAL at 05:39

## 2024-05-10 RX ADMIN — ASPIRIN 81 MG CHEWABLE TABLET 81 MG: 81 TABLET CHEWABLE at 09:23

## 2024-05-10 RX ADMIN — SERTRALINE 50 MG: 50 TABLET, FILM COATED ORAL at 09:24

## 2024-05-10 RX ADMIN — HEPARIN SODIUM 5000 UNITS: 5000 INJECTION INTRAVENOUS; SUBCUTANEOUS at 05:38

## 2024-05-10 RX ADMIN — FINASTERIDE 5 MG: 5 TABLET, FILM COATED ORAL at 09:24

## 2024-05-10 RX ADMIN — HYDRALAZINE HYDROCHLORIDE 100 MG: 50 TABLET ORAL at 21:53

## 2024-05-10 ASSESSMENT — PAIN SCALES - GENERAL
PAINLEVEL_OUTOF10: 0

## 2024-05-10 ASSESSMENT — PAIN SCALES - WONG BAKER
WONGBAKER_NUMERICALRESPONSE: NO HURT
WONGBAKER_NUMERICALRESPONSE: NO HURT

## 2024-05-10 NOTE — H&P
[facility] PHYSICAL REHABILITATION  [address] [place] [id number]     INPATIENT REHABILITATION  HISTORY AND PHYSICAL      Name: Amish Shetty CSN: [id number]   Age: 68 y.o.  MRN: [id number]   Sex: male Admit Date: 5/9/2024     PCP: Beth Cheema NP-C         Subjective:     Patient seen and examined.    History of the Present Illness:   This is a 68-year-old male with a past medical history of amyloidosis and nephrotic syndrome and a prior CVA with right hemiparesis and hypertension who was recently admitted to [facility].  Prior to admission patient had chronic kidney disease stage V.  During this hospitalization it was felt that patient is now end-stage renal disease and patient was initiated on hemodialysis by nephrology.  Patient was evaluated by PT and OT.  Patient was noted to be weak and debilitated.  It was felt that patient may benefit from transitioning to the inpatient rehab facility.  For full details regarding hospital course please refer to chart.    The patient  was noted to have impaired mobility and ADLs. Patient was felt to be a good candidate for acute inpatient rehabilitation. Upon evaluation by Physical Therapy and Occupational Therapy, the patient was recommended for acute inpatient rehabilitation. The patient was discharged and was subsequently admitted to the [facility] for intensive rehabilitation to help recover strength, function and mobility in the setting of debility due to end-stage renal disease and amyloidosis and prior CVA with residual right hemiparesis.  I personally saw and evaluated this patient face-to-face today in the inpatient rehab facility at [facility].  Patient is sitting in a chair in no apparent distress.  Patient is in good spirits.  Patient denies any pain.  No history of any nausea vomiting or abdominal pain.  No history of any chest pain or shortness of breath.  No history of any cough fever or  Dressing  Minimal assistance   Lower Body Dressing   Independent Lower Body Dressing   {Minimum assistance Lower Body Dressing  Moderate assistance   Bladder Management   Independent Bladder Management   Modified independence Toileting  Moderate assistance   Bowel Management   Independent Bowel Management   Modified independence      Physical Therapy   Prior Level of Function  Pre-Admission Screen  Post-Admission Evaluation   Ambulation   Independent Ambulation   Maximum assistance Minimal assistance  Level tile   Bed Mobility   Independent Bed Mobility   Supervision Modified independent  Modified independent   Supine to Sit   Independent Supine to Sit   Minimum assistance Minimal assistance   Sit to Stand   Independent Sit to Stand   Minimum assistance Contact guard assistance, Minimal Assistance   Bed/Chair Transfers   Independent Bed/Chair Transfers   Minimum assistance Minimal assistance   Toilet Transfers   Independent Toilet Transfers   Minimum assistance Toilet Transfers  Minimal assistance, Moderate assistance     Speech and Language Pathology  Post-Admission Evaluation                    Legend:   7 - Independent   6 - Modified Independent   5 - Standby Assistance / Supervision / Set-up   4 - Minimum Assistance / Contact Guard Assistance   3 - Moderate Assistance   2 - Maximum Assistance   1 - Total Assistance / Dependent       Labs on Admission:  Recent Results (from the past 24 hour(s))   Basic Metabolic Panel    Collection Time: 05/10/24  5:34 AM   Result Value Ref Range    Sodium 134 (L) 136 - 145 mmol/L    Potassium 3.7 3.5 - 5.5 mmol/L    Chloride 99 (L) 100 - 111 mmol/L    CO2 28 21 - 32 mmol/L    Anion Gap 7 3.0 - 18 mmol/L    Glucose 88 74 - 99 mg/dL    BUN 55 (H) 7.0 - 18 MG/DL    Creatinine 3.45 (H) 0.6 - 1.3 MG/DL    Bun/Cre Ratio 16 12 - 20      Est, Glom Filt Rate 18 (L) >60 ml/min/1.73m2    Calcium 8.4 (L) 8.5 - 10.1 MG/DL   CBC with Auto Differential    Collection Time: 05/10/24  5:34 AM    Result Value Ref Range    WBC 8.4 4.6 - 13.2 K/uL    RBC 3.14 (L) 4.35 - 5.65 M/uL    Hemoglobin 8.2 (L) 13.0 - 16.0 g/dL    Hematocrit 27.0 (L) 36.0 - 48.0 %    MCV 86.0 78.0 - 100.0 FL    MCH 26.1 24.0 - 34.0 PG    MCHC 30.4 (L) 31.0 - 37.0 g/dL    RDW 17.5 (H) 11.6 - 14.5 %    Platelets 297 135 - 420 K/uL    MPV 10.1 9.2 - 11.8 FL    Nucleated RBCs 0.0 0  WBC    nRBC 0.00 0.00 - 0.01 K/uL    Neutrophils % 67 40 - 73 %    Lymphocytes % 15 (L) 21 - 52 %    Monocytes % 13 (H) 3 - 10 %    Eosinophils % 4 0 - 5 %    Basophils % 1 0 - 2 %    Immature Granulocytes % 0 0.0 - 0.5 %    Neutrophils Absolute 5.6 1.8 - 8.0 K/UL    Lymphocytes Absolute 1.3 0.9 - 3.6 K/UL    Monocytes Absolute 1.1 0.05 - 1.2 K/UL    Eosinophils Absolute 0.3 0.0 - 0.4 K/UL    Basophils Absolute 0.1 0.0 - 0.1 K/UL    Immature Granulocytes Absolute 0.0 0.00 - 0.04 K/UL    Differential Type AUTOMATED     Magnesium    Collection Time: 05/10/24  5:34 AM   Result Value Ref Range    Magnesium 2.3 1.6 - 2.6 mg/dL           Assessment:     Primary Rehabilitation Diagnosis   Impaired mobility and ADLs in the setting of debility due to end-stage renal disease and amyloidosis and prior CVA with residual right hemiparesis    Other Co-Morbid Conditions managed in Rehab     -End-stage renal disease  -Amyloidosis  -Hypertension  -Prior CVA with right hemiparesis  -Dyslipidemia  -Obstructive sleep apnea-on CPAP  -Paroxysmal atrial fibrillation-on aspirin  -BPH  -History of chronic hypoxic respiratory failure as per the chart, on home oxygen          Willingness to participate in the program: Good      Rehabilitation Potential: Good      Plan:     1. Medical Issues being followed closely:    [x]  Fall and safety precautions     []  Wound Care     [x]  Bowel and Bladder Function     [x]  Fluid Electrolyte and Nutrition Balance     []  Pain Control      2. Issues that 24 hour rehabilitation nursing is following:    [x]  Fall and safety precautions     []

## 2024-05-10 NOTE — PLAN OF CARE
Problem: Chronic Conditions and Co-morbidities  Goal: Patient's chronic conditions and co-morbidity symptoms are monitored and maintained or improved  Outcome: Progressing  Flowsheets  Taken 5/9/2024 2030 by Ju Renee LPN  Care Plan - Patient's Chronic Conditions and Co-Morbidity Symptoms are Monitored and Maintained or Improved: Monitor and assess patient's chronic conditions and comorbid symptoms for stability, deterioration, or improvement  Taken 5/9/2024 1534 by Sherita Granados, RN  Care Plan - Patient's Chronic Conditions and Co-Morbidity Symptoms are Monitored and Maintained or Improved: Monitor and assess patient's chronic conditions and comorbid symptoms for stability, deterioration, or improvement     Problem: Safety - Adult  Goal: Free from fall injury  Outcome: Progressing  Flowsheets (Taken 5/9/2024 2100)  Free From Fall Injury: Instruct family/caregiver on patient safety

## 2024-05-10 NOTE — PROGRESS NOTES
RENAL DAILY PROGRESS NOTE            68-year-old male with past medical history of renal myelomatosis stroke hypertensions heart failure history of GI bleed admitted for short of breath following for renal failure  Subjective:       Complaint:   His breathing is at baseline.  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  Hypertension  Hypokalemia  Proteinuria  Hx diabetes  Hx CVA  Anemia,followed by hematology as OP,   Hx iron deficiency   PLAN:   Plan for dialysis today with 3K bath, UF goal 1.5 to 2 L as tolerated.  Continue diuretics.  Will switch him to TTS schedule next week.  Start on Epogen.  Has a chair at [facility] at 12:30 pm. He needs to come at 12:00 for the first visit.             Current Facility-Administered Medications   Medication Dose Route Frequency    aspirin chewable tablet 81 mg  81 mg Oral Daily with breakfast    atorvastatin (LIPITOR) tablet 40 mg  40 mg Oral Nightly    Virt-Caps 1 mg  1 capsule Oral Daily    bumetanide (BUMEX) tablet 2 mg  2 mg Oral BID    ferrous sulfate (IRON 325) tablet 325 mg  325 mg Oral Daily with breakfast    finasteride (PROSCAR) tablet 5 mg  5 mg Oral Daily    fluticasone (FLONASE) 50 MCG/ACT nasal spray 2 spray  2 spray Each Nostril Daily    hydrALAZINE (APRESOLINE) tablet 100 mg  100 mg Oral 3 times per day    isosorbide dinitrate (ISORDIL) tablet 40 mg  40 mg Oral TID    losartan (COZAAR) tablet 100 mg  100 mg Oral Daily    sertraline (ZOLOFT) tablet 50 mg  50 mg Oral Daily    acetaminophen (TYLENOL) tablet 650 mg  650 mg Oral Q4H PRN    polyethylene glycol

## 2024-05-10 NOTE — PLAN OF CARE
Clinical Factors Pt performed LB bathing via sponge bath at EOB with Ravi. Pt washed BLE thighs to lower legs and feet in forward flexion with supervision. Pt demonstrated ability to wash buttocks with Ravi reporting pt unable to wipe in anus crease in standing at FWW with CGA.     SHOWER TRANSFER  Initial Assessment   Transfer Technique     Shower Transfer Functional Level Not tested   Equipment     Shower Transfer Comments NT this date due to fatigue and pt decline     UPPER BODY DRESSING/UNDRESSING Initial Assessment   Functional Level Minimal assistance   Clinical Factors Pt performed UB dressing at EOB using rebekah technique with Ravi to pull down over shoulder.     LOWER BODY DRESSING/UNDRESSING Initial Assessment   Functional Level Moderate assistance   Clinical Factors Pt performed LB dressing with Ravi in sitting and standing at EOB with FWW. Pt demonstrated ability to thread BLE legs through pants in seated position at EOB and pulled up over R hip at FWW with Ravi. Pt demonstrated ability to pull up pants over LUE and buttocks in standing with CGA.     TOILETING Initial Assessment   Functional Level Moderate assistance   Clinical factors Pt performed all toileting tasks using FWW to [facility] with modA for clothing management (pulling up over R hip) and toileting hygiene (posterior care) and maintaining stability.     TOILET TRANSFER Initial Assessment   Transfer Technique Stand step   Functional Level Minimal assistance, Moderate assistance   Equipment Standard bedside commode   Toilet Transfer Comments with SBQC and mod VC's for sequencing and safety     WHEELCHAIR/BED TRANSFER INDEPENDENCE Initial Assessment   Transfer Technique   Stand step   Level of Assistance Minimal assistance, Moderate assistance   Equipment Bed, Wheelchair   Comments using SBQC with mod VC's for sequencing and safety      Activity Tolerance:   Patient limited by fatigue         EDUCATION:   Education Given To: Patient  Education  Provided: Role of Therapy;Plan of Care;Safety;Mobility Training;Transfer Training;Fall Prevention Strategies;DME/Home Modifications;ADL Function  Education Provided Comments: Pt provided education for POC, benefits and scop of OT as well as use of call bell for all self cares and standing activities, and positioning to maintain skin integrity.  Education Method: Demonstration;Verbal  Barriers to Learning: Cognition  Education Outcome: Verbalized understanding;Demonstrated understanding;Continued education needed      ASSESSMENT :  Based on the objective data described above, the patient presents with impaired standing balance, fatigue, impaired safety awareness, anxiety, impaired memory, and cognition which negatively impact pt performance, independence and safety with ADL/IADL's and functional mobility.    Pt would benefit from skilled occupational therapy in order to improve independent functional mobility/ADLs,/IADLs within the home. Interventions may include range of motion (AROM, PROM B UE), motor function (B UE/ strengthening/coordination), activity tolerance (vitals, oxygen saturation levels), balance training, ADL/IADL training and functional transfer training.      Please see IRC; Interdisciplinary Eval, Care Plan, and Patient Education for further information regarding occupational therapy examination and plan of care.      PLAN :  Recommendations and Planned Interventions:  [x]               Self Care Training                   [x]        Therapeutic Activities  [x]               Functional Mobility Training    [x]        Cognitive Retraining  [x]               Therapeutic Exercises            [x]        Endurance Activities  [x]               Balance Training                     [x]        Neuromuscular Re-Education  [x]               Visual/Perceptual Training      [x]   Home Safety Training  [x]               Patient Education                    [x]        Family Training/Education  []                (comment):    Frequency/Duration: Patient will be followed by occupational therapy  1-2 times per day/4-7 days per week to address goals.  Discharge Recommendations:   Home Health with 24 hr assistance  Further Equipment Recommendations for Discharge:   none at this time, dependent on progress         COMMUNICATION/EDUCATION:   [] Home safety education was provided and the patient/caregiver indicated understanding.  [] Patient/family have participated as able in goal setting and plan of care.  [x] Patient/family agree to work toward stated goals and plan of care.  [] Patient understands intent and goals of therapy, but is neutral about his/her participation.  [] Patient is unable to participate in goal setting and plan of care.  [] Care coordinated with nursing regarding patient safety needs and functional performance.    Please refer to the flow sheet for vital signs taken during this treatment.  After treatment:   [x] Patient left in no apparent distress sitting up in chair  [] Patient left in no apparent distress in bed  [] Patient handoff to SLP/PT  [x] Call bell left within reach  [] Nursing notified  [] Caregiver present  [] Bed alarm activated    Order received from MD for occupational therapy services and chart reviewed.  Pt to be seen 5 times per week for 3 hours of total therapy per day for 2  weeks.  Thank you for the referral.    IRF-DIANA Completed: yes  Entered Differentiated Treatment minutes: yes    Thank you for this referral.  Vielka Kern OT  5/10/2024

## 2024-05-10 NOTE — DIALYSIS
HD Care plan  Time: 3 hrs  Dialysate: 3 K+   2.5 Ca++  Bath  Net UF: 3 L  Access: Aseptic care for RT Chest TDC  Hemodynamic stability: Maintain BP WNL     Pre Dialysis:  Patient received from Sharlene Fragoso RN, Patient on a bed, A+O x 4, on RA  no s/s of acute distress noted. RT Chest TDC  assessed, dressing clean and intact. TDC accessed per protocol without any difficulty, line patent with good flow.     Intra Dialysis:  Time out / safety process performed per policy. Tx initiated at 1445.    TDC flowing with ease. For hemodynamic stability UF goal set at 3000 ml as tolerated.  Pt offered assistance with repositioning every 2 hours/prn    Vascular access visible and line connections remained intact throughout entire duration of treatment.   Vital signs checked every 15 mins.     Post Dialysis:  Tx completed at 1745,   Tolerated well, 3.5 L  removed.  De-accessed per protocol.    Dialysis catheter locked accordingly with Heparin 1.9 ml in arterial port, and 1.9 ml in venous port. Catheter dressing clean, dry and intact.  Post Dialysis report given to FRANSISCA Su

## 2024-05-10 NOTE — PLAN OF CARE
Problem: Physical Therapy - Adult  Goal: By Discharge: Performs mobility at highest level of function for planned discharge setting.  See evaluation for individualized goals.  Description: Physical Therapy Short Term Goals  = Long Term Goals  Initiated 5/10/2024 and to be accomplished within 7-10 day(s) (2024)  1.  Patient will supine to sit, sit to supine, roll right, and roll left  in bed with modified independence.    2.  Patient will transfer from bed to chair and chair to bed with modified independence using the least restrictive device.  3.  Patient will perform sit to stand with modified independence  4.  Patient will ambulate with modified independence for 50 feet with the least restrictive device.   5.  Patient will ascend/descend 4 stairs with 1 handrail(s) with supervision/set-up.    Outcome: Progressing     PHYSICAL THERAPY EVALUATION    Patient: Amish Shetty (68 y.o. male)  Date: 5/10/2024  Diagnosis: Debility [R53.81]   Precautions: Fall Risk                Chart, physical therapy assessment, plan of care and goals were reviewed.    Time In: 32  Time Out: 0909  Minutes: 37    Time In: 28  Time Out: 1030  Minutes: 62  Entered Differentiated Treatment minutes: Yes    Patient seen for: Initial Evaluation, Patient Education, Transfer Training, Gait Training    Pain:  Pt pain was reported as no c/o pain pre-treatment.  Pt pain was reported as no c/o pain post-treatment.  Intervention: N/A    Patient identified with name and : Yes    SUBJECTIVE:     Patient stated “they only saw me twice in the hospital.”  Pt expresses motivation to participate in skilled therapy services noting he wishes to return to his PLOF and indicating, \"I was walking up to half a mile a day;between a third and a half of a mile.\"  Pt is pleasant and cooperative throughout treatment session but demonstrates impulsivity and perseveration at times requiring redirection.    Patient's Goal for Physical Therapy: \"to get back to  Assessment 5/10/2024   Steps/Stairs ambulated 4   Step Height 6\"   Rail Use Left ascending (right descending (with left UE support throughout secondary to residual right hemiparesis))   Assistance Level Minimal assistance   Comments  Pt negotiates first two steps of stair trial ascending with right LE leading and descending with left LE leading noting that that his how he was taught to ascend and descend stairs.  PT attempted to educate patient on ascending with left LE leading and descending with right LE leading.  Pt reports increased effort and demonstrates increased compensatory left lean and weight bearing through forearm and elbow on arm rail with attempts at ascending with left LE leading.   Curbs/Ramps  CGA/Minimal assistance for balance and safety negotiating 6\" curb with SBQC.  Pt ascends with right LE leading and descends with left LE leading x 2 trials.  Pt demonstrates compensatory quarter turn/side step with stepping down with SBQC and then left LE leading followed by right LE.     Neuromuscular re-education:   Pt performed 5 repetitions sit <> stand from w/c height in front of mirror for visual feedback with PT providing moderate to maximal verbal cuing for set up to promote more equal B LE weight bearing and decrease compensatory wide TISHA and left lateral lean.  Pt requires minimal assistance for lift off with PT providing manual cues through right hand on distal third of right femur to promote functional strengthening.  Pt continues to attempt compensatory wide TISHA with increased left lean but is receptive with cuing to correct.    ASSESSMENT :  Based on the objective data described above, the patient presents with impaired functional strength and balance with decreased activity tolerance following multiple recent hospitalizations limiting safety and independence with functional mobility.    Patient will benefit from skilled intervention to address the above impairments.  Patient's rehabilitation

## 2024-05-11 LAB
HBV SURFACE AG SER QL: <0.1 INDEX
HBV SURFACE AG SER QL: NEGATIVE

## 2024-05-11 PROCEDURE — 6360000002 HC RX W HCPCS: Performed by: EMERGENCY MEDICINE

## 2024-05-11 PROCEDURE — 6370000000 HC RX 637 (ALT 250 FOR IP): Performed by: EMERGENCY MEDICINE

## 2024-05-11 PROCEDURE — 1180000000 HC REHAB R&B

## 2024-05-11 PROCEDURE — 36415 COLL VENOUS BLD VENIPUNCTURE: CPT

## 2024-05-11 PROCEDURE — 87340 HEPATITIS B SURFACE AG IA: CPT

## 2024-05-11 RX ADMIN — HYDRALAZINE HYDROCHLORIDE 100 MG: 50 TABLET ORAL at 21:26

## 2024-05-11 RX ADMIN — LOSARTAN POTASSIUM 100 MG: 50 TABLET, FILM COATED ORAL at 10:10

## 2024-05-11 RX ADMIN — ISOSORBIDE DINITRATE 40 MG: 20 TABLET ORAL at 10:10

## 2024-05-11 RX ADMIN — HYDRALAZINE HYDROCHLORIDE 100 MG: 50 TABLET ORAL at 13:44

## 2024-05-11 RX ADMIN — HEPARIN SODIUM 5000 UNITS: 5000 INJECTION INTRAVENOUS; SUBCUTANEOUS at 06:29

## 2024-05-11 RX ADMIN — HEPARIN SODIUM 5000 UNITS: 5000 INJECTION INTRAVENOUS; SUBCUTANEOUS at 21:25

## 2024-05-11 RX ADMIN — FINASTERIDE 5 MG: 5 TABLET, FILM COATED ORAL at 10:10

## 2024-05-11 RX ADMIN — HYDRALAZINE HYDROCHLORIDE 100 MG: 50 TABLET ORAL at 06:29

## 2024-05-11 RX ADMIN — ISOSORBIDE DINITRATE 40 MG: 20 TABLET ORAL at 13:44

## 2024-05-11 RX ADMIN — Medication 1 MG: at 10:10

## 2024-05-11 RX ADMIN — BUMETANIDE 2 MG: 1 TABLET ORAL at 17:06

## 2024-05-11 RX ADMIN — SERTRALINE 50 MG: 50 TABLET, FILM COATED ORAL at 10:10

## 2024-05-11 RX ADMIN — ASPIRIN 81 MG CHEWABLE TABLET 81 MG: 81 TABLET CHEWABLE at 10:17

## 2024-05-11 RX ADMIN — ATORVASTATIN CALCIUM 40 MG: 40 TABLET, FILM COATED ORAL at 21:25

## 2024-05-11 RX ADMIN — ISOSORBIDE DINITRATE 40 MG: 20 TABLET ORAL at 21:25

## 2024-05-11 RX ADMIN — HEPARIN SODIUM 5000 UNITS: 5000 INJECTION INTRAVENOUS; SUBCUTANEOUS at 13:39

## 2024-05-11 RX ADMIN — FERROUS SULFATE TAB 325 MG (65 MG ELEMENTAL FE) 325 MG: 325 (65 FE) TAB at 10:17

## 2024-05-11 RX ADMIN — BUMETANIDE 2 MG: 1 TABLET ORAL at 10:10

## 2024-05-11 ASSESSMENT — PAIN SCALES - GENERAL
PAINLEVEL_OUTOF10: 0

## 2024-05-11 NOTE — PLAN OF CARE
Problem: Chronic Conditions and Co-morbidities  Goal: Patient's chronic conditions and co-morbidity symptoms are monitored and maintained or improved  5/11/2024 0816 by Patrice Joya LPN  Outcome: Progressing  5/11/2024 0108 by Ju Renee LPN  Outcome: Progressing

## 2024-05-11 NOTE — PLAN OF CARE
Problem: Chronic Conditions and Co-morbidities  Goal: Patient's chronic conditions and co-morbidity symptoms are monitored and maintained or improved  Outcome: Progressing     Problem: Safety - Adult  Goal: Free from fall injury  Outcome: Progressing  Flowsheets (Taken 5/10/2024 2100)  Free From Fall Injury: Instruct family/caregiver on patient safety     Problem: Skin/Tissue Integrity  Goal: Absence of new skin breakdown  Description: 1.  Monitor for areas of redness and/or skin breakdown  2.  Assess vascular access sites hourly  3.  Every 4-6 hours minimum:  Change oxygen saturation probe site  4.  Every 4-6 hours:  If on nasal continuous positive airway pressure, respiratory therapy assess nares and determine need for appliance change or resting period.  Outcome: Progressing     Problem: Occupational Therapy - Adult  Goal: By Discharge: Performs self-care activities at highest level of function for planned discharge setting.  See evaluation for individualized goals.  Description: Occupational Therapy Goals   Long Term Goals  Initiated 5/10/24 and to be accomplished within 2 week(s)  1. Pt will perform self-feeding with Ginger.  2. Pt will perform grooming with Ginger.  3. Pt will perform UB bathing with supervision.  4. Pt will perform LB bathing with supervision.  5. Pt will perform tub/shower transfer with CGA.   6. Pt will perform UB dressing with setup.  7. Pt will perform LB dressing with supervision.  8. Pt will perform toileting task with CGA.  9. Pt will perform toilet transfer with CGA.      Short Term Goals   Initiated 5/10/24 and to be accomplished within 7 day(s)  1. Pt will perform self-feeding with setup.   2. Pt will perform grooming with setup.  3. Pt will perform UB bathing with Ravi.  4. Pt will perform LB bathing with CGA.  5. Pt will perform tub/shower transfer with Ravi.  6. Pt will perform UB dressing with SBA.  7. Pt will perform LB dressing with CGA.  8. Pt will perform toileting task with

## 2024-05-11 NOTE — PROGRESS NOTES
[facility]  [address] [place] [id number]     INPATIENT REHABILITATION  DAILY PROGRESS NOTE     Date: 5/11/2024    Name: Amish Shetty Age / Sex: 68 y.o. / male   CSN: [id number] MRN: [id number]   Admit Date: 5/9/2024 Length of Stay: 2 days     Primary Rehabilitation Diagnosis: Impaired Mobility and ADLs secondary to debility due to end-stage renal disease and amyloidosis and prior CVA with residual right hemiparesis     Subjective:     Pt seen by me face to face today.  No acute patient or nursing concerns.        Objective:     Vital Signs:  Patient Vitals for the past 24 hrs:   BP Temp Temp src Pulse Resp SpO2   05/11/24 1100 (!) 126/50 -- -- 55 -- --   05/11/24 0808 (!) 163/74 97.8 °F (36.6 °C) Oral 54 20 100 %   05/11/24 0615 (!) 157/67 -- -- 54 -- --   05/10/24 2145 126/65 -- -- 54 -- --   05/10/24 1912 120/62 98.4 °F (36.9 °C) Oral 77 18 95 %   05/10/24 1800 (!) 147/82 98.4 °F (36.9 °C) -- 58 18 --   05/10/24 1745 (!) 150/85 -- -- 54 -- --   05/10/24 1730 138/69 -- -- 50 -- --   05/10/24 1715 (!) 156/78 -- -- 51 -- --   05/10/24 1700 137/74 -- -- 50 -- --   05/10/24 1645 (!) 132/99 -- -- 57 -- --   05/10/24 1630 (!) 151/79 -- -- 54 -- --   05/10/24 1615 (!) 152/81 -- -- 58 -- --   05/10/24 1600 (!) 152/82 -- -- 53 -- --   05/10/24 1545 (!) 148/82 -- -- 54 -- --   05/10/24 1530 (!) 154/82 -- -- 50 -- --   05/10/24 1515 (!) 152/79 -- -- 55 -- --   05/10/24 1500 (!) 150/79 -- -- 51 -- --   05/10/24 1445 (!) 148/74 -- -- 50 -- --   05/10/24 1430 138/67 98 °F (36.7 °C) -- 50 18 --        Physical Examination:  General:          Sitting in a chair in no acute distress.  HEENT:           Pupils equal.  Sclera anicteric.  Conjunctiva pink.  Mucous membranes                           Moist, no ear or nasal discharge  Neck:               Supple.  Trachea midline.  No accessory muscle use.  No thyromegaly.                           No jugular venous distention, no carotid  independent  Rolling to Left: Modified independent  Supine to Sit: Minimal assistance  Sit to Stand: Contact guard assistance, Minimal Assistance  Sit to Supine: Supervision  Bed to Chair: Minimal assistance  Car Transfer: Minimal Assistance   Wheelchair Mobility  Yes       Wheelchair Mobility  Propulsion: Yes         Ambulation  Small Base Quad Cane  Minimal assistance  Level tile  10 to 15 feet in room to and from bathroom and vanity, 22 feet x 2 trials and 18 feet x 2 trials Ambulation  Device: Small Base Quad Cane  Assistance: Minimal assistance  Surface: Level tile  Distance: 10 to 15 feet in room to and from bathroom and vanity, 22 feet x 2 trials and 18 feet x 2 trials   Stairs  Yes  Left ascending (right descending (with left UE support throughout secondary to residual right hemiparesis))  Minimal assistance Stairs  Stairs?: Yes  Rails: Left ascending (right descending (with left UE support throughout secondary to residual right hemiparesis))  Assistance: Minimal assistance     Functional Progress:    OCCUPATIONAL THERAPY    ON ADMISSION MOST RECENT   Eating  Stand by assistance Eating  Feeding: Stand by assistance   Grooming  Stand by assistance Grooming  Grooming: Stand by assistance   Bathing  UB Bathing Moderate assistance  LB Bathing Minimal assistance Bathing  UB Bathing   UE Bathing: Moderate assistance  LB Bathing   FLOW([id number]:last)@   Upper Body Dressing  Minimal assistance   Upper Body Dressing  UE Dressing: Minimal assistance   Lower Body Dressing  Moderate assistance Lower Body Dressing  LE Dressing: Moderate assistance   Toileting  Moderate assistance Toileting  Toileting: Moderate assistance   Toilet Transfers  Minimal assistance, Moderate assistance Toilet Transfers  Toilet Transfer: Minimal assistance, Moderate assistance   Tub Transfers     SHOWER Transfers  Shower Transfers: Not tested  Shower Transfers Comments: NT this date due to fatigue and pt decline Tub Transfers     SHOWER

## 2024-05-11 NOTE — PLAN OF CARE
[facility]ICAL REHABILITATION  [address] [place] [address]     INPATIENT REHABILITATION  OVERALL PLAN OF CARE    Name: Amish Shetty CSN: [id number]   Age: 68 y.o.  MRN: [id number]   Sex: male Admit Date: 5/9/2024     Primary Rehabilitation Diagnosis   Impaired mobility and ADLs in the setting of debility due to end-stage renal disease and amyloidosis and prior CVA with residual right hemiparesis     Other Co-Morbid Conditions managed in Rehab      -End-stage renal disease  -Amyloidosis  -Hypertension  -Prior CVA with right hemiparesis  -Dyslipidemia  -Obstructive sleep apnea-on CPAP  -Paroxysmal atrial fibrillation-on aspirin  -BPH  -History of chronic hypoxic respiratory failure as per the chart, on home oxygen    MEDICAL PLAN:     -Impaired mobility and ADLs in the setting of debility due to end-stage renal disease and amyloidosis and prior CVA with residual right hemiparesis  Physical therapy and Occupational Therapy  Fall precautions     -End-stage renal disease  Continue hemodialysis per nephrology.  Also on Bumex.  Patient makes small amount of urine     -Amyloidosis  Monitor, outpatient follow-up     -Hypertension  Continue hydralazine 100 mg p.o. every 8 hours  Continue Isordil 40 mg 3 times daily  Continue losartan 100 mg daily  Monitor blood pressures     -Prior CVA with right hemiparesis  Aspirin and statin     -Dyslipidemia  Atorvastatin     -Obstructive sleep apnea-on CPAP  CPAP at bedtime     -Paroxysmal atrial fibrillation-on aspirin  Continue aspirin     -BPH  Continue Proscar     -History of chronic hypoxic respiratory failure as per the chart, on home oxygen  Home oxygen     I discussed the care plan with the patient      Recent Labs     05/08/24  0343 05/09/24  0404 05/10/24  0534   WBC 9.0 8.6 8.4   HGB 7.8* 8.1* 8.2*   HCT 25.2* 27.5* 27.0*    306 297     Recent Labs     05/08/24  0343 05/09/24  0404 05/10/24  0534   * 134* 134*   K 3.5 3.8 3.7    102 99*  changes would you recommend to the goals as written? None      Are the interventions noted sufficient for achieving the optimal level of function?    [x]  Yes      []  No    What changes would you recommend to the interventions noted? If therapy staff is unable to provide 3 hr of total therapy per day in 5 days due to medical issues or decreased patient tolerance, may modify treatment schedule to 15 hr/week.      Estimated length of stay: 14 days    Willingness to participate in the program: Good      Medical rehabilitation prognosis:    []  Excellent     [x]  Good     []  Fair     []  Guarded       Discharge Destination:     [x]  Home     []  Assisted Living Facility     []  Skilled Nursing Facility     []  [facility]     []  Long Term Care Facility     []  Other:       Signed:    Clinton Zamora MD      May 10, 2024

## 2024-05-12 VITALS
SYSTOLIC BLOOD PRESSURE: 134 MMHG | DIASTOLIC BLOOD PRESSURE: 69 MMHG | HEART RATE: 56 BPM | WEIGHT: 184 LBS | TEMPERATURE: 97.5 F | BODY MASS INDEX: 27.25 KG/M2 | HEIGHT: 69 IN | RESPIRATION RATE: 20 BRPM | OXYGEN SATURATION: 99 %

## 2024-05-12 PROCEDURE — 1180000000 HC REHAB R&B

## 2024-05-12 PROCEDURE — 97535 SELF CARE MNGMENT TRAINING: CPT

## 2024-05-12 PROCEDURE — 97110 THERAPEUTIC EXERCISES: CPT

## 2024-05-12 PROCEDURE — 6370000000 HC RX 637 (ALT 250 FOR IP): Performed by: EMERGENCY MEDICINE

## 2024-05-12 PROCEDURE — 6360000002 HC RX W HCPCS: Performed by: EMERGENCY MEDICINE

## 2024-05-12 PROCEDURE — 97530 THERAPEUTIC ACTIVITIES: CPT

## 2024-05-12 RX ADMIN — HYDRALAZINE HYDROCHLORIDE 100 MG: 50 TABLET ORAL at 14:11

## 2024-05-12 RX ADMIN — HYDRALAZINE HYDROCHLORIDE 100 MG: 50 TABLET ORAL at 06:19

## 2024-05-12 RX ADMIN — Medication 1 MG: at 10:24

## 2024-05-12 RX ADMIN — FINASTERIDE 5 MG: 5 TABLET, FILM COATED ORAL at 10:11

## 2024-05-12 RX ADMIN — FERROUS SULFATE TAB 325 MG (65 MG ELEMENTAL FE) 325 MG: 325 (65 FE) TAB at 10:16

## 2024-05-12 RX ADMIN — BUMETANIDE 2 MG: 1 TABLET ORAL at 10:12

## 2024-05-12 RX ADMIN — HEPARIN SODIUM 5000 UNITS: 5000 INJECTION INTRAVENOUS; SUBCUTANEOUS at 06:18

## 2024-05-12 RX ADMIN — ATORVASTATIN CALCIUM 40 MG: 40 TABLET, FILM COATED ORAL at 21:22

## 2024-05-12 RX ADMIN — ISOSORBIDE DINITRATE 40 MG: 20 TABLET ORAL at 10:12

## 2024-05-12 RX ADMIN — HYDRALAZINE HYDROCHLORIDE 100 MG: 50 TABLET ORAL at 21:22

## 2024-05-12 RX ADMIN — LOSARTAN POTASSIUM 100 MG: 50 TABLET, FILM COATED ORAL at 10:11

## 2024-05-12 RX ADMIN — HEPARIN SODIUM 5000 UNITS: 5000 INJECTION INTRAVENOUS; SUBCUTANEOUS at 14:12

## 2024-05-12 RX ADMIN — SERTRALINE 50 MG: 50 TABLET, FILM COATED ORAL at 10:11

## 2024-05-12 RX ADMIN — ASPIRIN 81 MG CHEWABLE TABLET 81 MG: 81 TABLET CHEWABLE at 10:16

## 2024-05-12 RX ADMIN — BUMETANIDE 2 MG: 1 TABLET ORAL at 17:59

## 2024-05-12 RX ADMIN — ISOSORBIDE DINITRATE 40 MG: 20 TABLET ORAL at 14:10

## 2024-05-12 RX ADMIN — HEPARIN SODIUM 5000 UNITS: 5000 INJECTION INTRAVENOUS; SUBCUTANEOUS at 21:22

## 2024-05-12 RX ADMIN — ISOSORBIDE DINITRATE 40 MG: 20 TABLET ORAL at 21:23

## 2024-05-12 ASSESSMENT — PAIN SCALES - GENERAL
PAINLEVEL_OUTOF10: 0

## 2024-05-12 NOTE — PROGRESS NOTES
RENAL DAILY PROGRESS NOTE            68-year-old male with past medical history of renal myelomatosis stroke hypertensions heart failure history of GI bleed admitted for short of breath following for renal failure  Subjective:       Complaint:   His breathing is at baseline.  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  Hypertension  Hypokalemia  Proteinuria  Hx diabetes  Hx CVA  Anemia,followed by hematology as OP,   Hx iron deficiency   PLAN:   Plan for dialysis on MWF  schedule, with 3K bath, UF goal 1.5 to 2 L as tolerated.  Continue diuretics.  Will try to get him MWF schedule chair outpatient.   Start on Epogen.  Has a chair at [facility] at 12:30 pm. He needs to come at 12:00 for the first visit.             Current Facility-Administered Medications   Medication Dose Route Frequency    epoetin ole-epbx (RETACRIT) injection 6,000 Units  6,000 Units SubCUTAneous Once per day on Mon Wed Fri    hydrALAZINE (APRESOLINE) tablet 10 mg  10 mg Oral Q6H PRN    heparin (porcine) injection 3,800 Units  3,800 Units IntraCATHeter PRN    aspirin chewable tablet 81 mg  81 mg Oral Daily with breakfast    atorvastatin (LIPITOR) tablet 40 mg  40 mg Oral Nightly    Virt-Caps 1 mg  1 capsule Oral Daily    bumetanide (BUMEX) tablet 2 mg  2 mg Oral BID    ferrous sulfate (IRON 325) tablet 325 mg  325 mg Oral Daily with breakfast    finasteride (PROSCAR) tablet 5 mg  5 mg Oral Daily    fluticasone (FLONASE) 50 MCG/ACT nasal spray 2 spray  2 spray Each

## 2024-05-12 NOTE — PROGRESS NOTES
[facility] PHYSICAL REHABILITATION  [address] [place] [id number]     INPATIENT REHABILITATION  DAILY PROGRESS NOTE     Date: 5/12/2024    Name: Amish Shetty Age / Sex: 68 y.o. / male   CSN: [id number] MRN: [id number]   Admit Date: 5/9/2024 Length of Stay: 3 days     Primary Rehabilitation Diagnosis: Impaired Mobility and ADLs secondary to debility due to end-stage renal disease and amyloidosis and prior CVA with residual right hemiparesis     Subjective:     Pt seen by me face to face today 5/12/24.  No acute patient or nursing concerns.        Objective:     Vital Signs:  Patient Vitals for the past 24 hrs:   BP Temp Temp src Pulse Resp SpO2   05/12/24 0815 (!) 142/81 97.6 °F (36.4 °C) Oral 58 20 97 %   05/12/24 0615 (!) 168/70 -- -- -- -- --   05/11/24 2121 129/63 97.3 °F (36.3 °C) Oral 55 18 99 %   05/11/24 1546 115/66 97.9 °F (36.6 °C) Oral 59 20 96 %          Physical Examination:  General:          Sitting in a chair in no acute distress.  HEENT:           Pupils equal.  Sclera anicteric.  Conjunctiva pink.  Mucous membranes                           Moist, no ear or nasal discharge  Neck:               Supple.  Trachea midline.  No accessory muscle use.  No thyromegaly.                           No jugular venous distention, no carotid bruit  CV:                  Regular rate and rhythm. S1S2+  Lungs:             Clear to auscultation bilaterally.  No Wheezing or Rhonchi. No rales.  Abdomen:        Soft, non-tender. Not distended.  Bowel sounds normal.   Extremities:     No cyanosis.  No lower extremity edema. Pulses 1+ b/l  Neurologic:      Alert and oriented X 3.  Follows commands, responds appropriately.  Has right hemiparesis  Skin:                Warm and dry.  No rashes.     Current Medications:  Current Facility-Administered Medications   Medication Dose Route Frequency    epoetin ole-epbx (RETACRIT) injection 6,000 Units  6,000 Units SubCUTAneous Once per day on Mon Wed Fri

## 2024-05-12 NOTE — PLAN OF CARE
Problem: Occupational Therapy - Adult  Goal: By Discharge: Performs self-care activities at highest level of function for planned discharge setting.  See evaluation for individualized goals.  Description: Occupational Therapy Goals   Long Term Goals  Initiated 5/10/24 and to be accomplished within 2 week(s)  1. Pt will perform self-feeding with Ginger.  2. Pt will perform grooming with Ginger.  3. Pt will perform UB bathing with supervision.  4. Pt will perform LB bathing with supervision.  5. Pt will perform tub/shower transfer with CGA.   6. Pt will perform UB dressing with setup.  7. Pt will perform LB dressing with supervision.  8. Pt will perform toileting task with CGA.  9. Pt will perform toilet transfer with CGA.      Short Term Goals   Initiated 5/10/24 and to be accomplished within 7 day(s)  1. Pt will perform self-feeding with setup.   2. Pt will perform grooming with setup.  3. Pt will perform UB bathing with Ravi.  4. Pt will perform LB bathing with CGA.  5. Pt will perform tub/shower transfer with Ravi.  6. Pt will perform UB dressing with SBA.  7. Pt will perform LB dressing with CGA.  8. Pt will perform toileting task with Ravi.  9. Pt will perform toilet transfer with Ravi.    Outcome: Progressing   Occupational Therapy TREATMENT    Patient: Amish Shetty   68 y.o.    Patient identified with name and : yes    Date: 2024    First Tx Session  Time In: 1100  Time Out: 1200    Diagnosis: Debility [R53.81]   Precautions:  Restrictions/Precautions: Fall Risk  Required Braces or Orthoses?: No                             Chart, occupational therapy assessment, plan of care, and goals were reviewed.     Pain:  Pt reports 0/10 pain or discomfort prior to treatment.    Pt reports 0/10 pain or discomfort post treatment.   Intervention Provided: NA      SUBJECTIVE:   Patient stated “My kidneys aren't functioning very well\".    OBJECTIVE DATA SUMMARY:     COGNITION/PERCEPTION Daily Assessment   Orientation

## 2024-05-12 NOTE — PLAN OF CARE
Problem: Chronic Conditions and Co-morbidities  Goal: Patient's chronic conditions and co-morbidity symptoms are monitored and maintained or improved  5/12/2024 0823 by Patrice Joya LPN  Outcome: Progressing  5/11/2024 2247 by Sherry Manrique RN  Outcome: Progressing  Flowsheets (Taken 5/11/2024 2000)  Care Plan - Patient's Chronic Conditions and Co-Morbidity Symptoms are Monitored and Maintained or Improved: Monitor and assess patient's chronic conditions and comorbid symptoms for stability, deterioration, or improvement

## 2024-05-13 LAB
ANION GAP SERPL CALC-SCNC: 8 MMOL/L (ref 3–18)
BUN SERPL-MCNC: 68 MG/DL (ref 7–18)
BUN/CREAT SERPL: 17 (ref 12–20)
CALCIUM SERPL-MCNC: 8.5 MG/DL (ref 8.5–10.1)
CHLORIDE SERPL-SCNC: 101 MMOL/L (ref 100–111)
CO2 SERPL-SCNC: 28 MMOL/L (ref 21–32)
CREAT SERPL-MCNC: 4.02 MG/DL (ref 0.6–1.3)
ERYTHROCYTE [DISTWIDTH] IN BLOOD BY AUTOMATED COUNT: 17.4 % (ref 11.6–14.5)
GLUCOSE SERPL-MCNC: 97 MG/DL (ref 74–99)
HCT VFR BLD AUTO: 27.3 % (ref 36–48)
HGB BLD-MCNC: 8.2 G/DL (ref 13–16)
MCH RBC QN AUTO: 26.5 PG (ref 24–34)
MCHC RBC AUTO-ENTMCNC: 30 G/DL (ref 31–37)
MCV RBC AUTO: 88.1 FL (ref 78–100)
NRBC # BLD: 0 K/UL (ref 0–0.01)
NRBC BLD-RTO: 0 PER 100 WBC
PLATELET # BLD AUTO: 304 K/UL (ref 135–420)
PMV BLD AUTO: 10.3 FL (ref 9.2–11.8)
POTASSIUM SERPL-SCNC: 3.8 MMOL/L (ref 3.5–5.5)
RBC # BLD AUTO: 3.1 M/UL (ref 4.35–5.65)
SODIUM SERPL-SCNC: 137 MMOL/L (ref 136–145)
WBC # BLD AUTO: 8.9 K/UL (ref 4.6–13.2)

## 2024-05-13 PROCEDURE — 94761 N-INVAS EAR/PLS OXIMETRY MLT: CPT

## 2024-05-13 PROCEDURE — 97112 NEUROMUSCULAR REEDUCATION: CPT

## 2024-05-13 PROCEDURE — 85027 COMPLETE CBC AUTOMATED: CPT

## 2024-05-13 PROCEDURE — 6360000002 HC RX W HCPCS: Performed by: EMERGENCY MEDICINE

## 2024-05-13 PROCEDURE — 80048 BASIC METABOLIC PNL TOTAL CA: CPT

## 2024-05-13 PROCEDURE — 99222 1ST HOSP IP/OBS MODERATE 55: CPT | Performed by: NURSE PRACTITIONER

## 2024-05-13 PROCEDURE — 90935 HEMODIALYSIS ONE EVALUATION: CPT

## 2024-05-13 PROCEDURE — 97110 THERAPEUTIC EXERCISES: CPT

## 2024-05-13 PROCEDURE — 36415 COLL VENOUS BLD VENIPUNCTURE: CPT

## 2024-05-13 PROCEDURE — 6370000000 HC RX 637 (ALT 250 FOR IP): Performed by: EMERGENCY MEDICINE

## 2024-05-13 PROCEDURE — 97116 GAIT TRAINING THERAPY: CPT

## 2024-05-13 PROCEDURE — 97530 THERAPEUTIC ACTIVITIES: CPT

## 2024-05-13 PROCEDURE — 6360000002 HC RX W HCPCS: Performed by: INTERNAL MEDICINE

## 2024-05-13 PROCEDURE — 1180000000 HC REHAB R&B

## 2024-05-13 RX ADMIN — ISOSORBIDE DINITRATE 40 MG: 20 TABLET ORAL at 22:07

## 2024-05-13 RX ADMIN — Medication 1 MG: at 08:25

## 2024-05-13 RX ADMIN — HYDRALAZINE HYDROCHLORIDE 100 MG: 50 TABLET ORAL at 06:49

## 2024-05-13 RX ADMIN — HEPARIN SODIUM 5000 UNITS: 5000 INJECTION INTRAVENOUS; SUBCUTANEOUS at 06:46

## 2024-05-13 RX ADMIN — ATORVASTATIN CALCIUM 40 MG: 40 TABLET, FILM COATED ORAL at 22:07

## 2024-05-13 RX ADMIN — LOSARTAN POTASSIUM 100 MG: 50 TABLET, FILM COATED ORAL at 08:25

## 2024-05-13 RX ADMIN — ISOSORBIDE DINITRATE 40 MG: 20 TABLET ORAL at 08:24

## 2024-05-13 RX ADMIN — HEPARIN SODIUM 5000 UNITS: 5000 INJECTION INTRAVENOUS; SUBCUTANEOUS at 22:07

## 2024-05-13 RX ADMIN — SERTRALINE 50 MG: 50 TABLET, FILM COATED ORAL at 08:25

## 2024-05-13 RX ADMIN — BUMETANIDE 2 MG: 1 TABLET ORAL at 08:25

## 2024-05-13 RX ADMIN — HEPARIN SODIUM 5000 UNITS: 5000 INJECTION INTRAVENOUS; SUBCUTANEOUS at 13:43

## 2024-05-13 RX ADMIN — FINASTERIDE 5 MG: 5 TABLET, FILM COATED ORAL at 08:25

## 2024-05-13 RX ADMIN — FERROUS SULFATE TAB 325 MG (65 MG ELEMENTAL FE) 325 MG: 325 (65 FE) TAB at 08:24

## 2024-05-13 RX ADMIN — BUMETANIDE 2 MG: 1 TABLET ORAL at 19:02

## 2024-05-13 RX ADMIN — HYDRALAZINE HYDROCHLORIDE 100 MG: 50 TABLET ORAL at 22:07

## 2024-05-13 RX ADMIN — EPOETIN ALFA-EPBX 6000 UNITS: 3000 INJECTION, SOLUTION INTRAVENOUS; SUBCUTANEOUS at 22:08

## 2024-05-13 RX ADMIN — ASPIRIN 81 MG CHEWABLE TABLET 81 MG: 81 TABLET CHEWABLE at 08:24

## 2024-05-13 ASSESSMENT — PAIN SCALES - GENERAL
PAINLEVEL_OUTOF10: 0

## 2024-05-13 NOTE — PLAN OF CARE
Problem: Chronic Conditions and Co-morbidities  Goal: Patient's chronic conditions and co-morbidity symptoms are monitored and maintained or improved  Outcome: Progressing  Flowsheets (Taken 5/12/2024 2030)  Care Plan - Patient's Chronic Conditions and Co-Morbidity Symptoms are Monitored and Maintained or Improved: Monitor and assess patient's chronic conditions and comorbid symptoms for stability, deterioration, or improvement     Problem: Safety - Adult  Goal: Free from fall injury  Outcome: Progressing  Flowsheets (Taken 5/12/2024 2030)  Free From Fall Injury: Instruct family/caregiver on patient safety     Problem: Skin/Tissue Integrity  Goal: Absence of new skin breakdown  Description: 1.  Monitor for areas of redness and/or skin breakdown  2.  Assess vascular access sites hourly  3.  Every 4-6 hours minimum:  Change oxygen saturation probe site  4.  Every 4-6 hours:  If on nasal continuous positive airway pressure, respiratory therapy assess nares and determine need for appliance change or resting period.  Outcome: Progressing     Problem: Pain  Goal: Verbalizes/displays adequate comfort level or baseline comfort level  Outcome: Progressing

## 2024-05-13 NOTE — PLAN OF CARE
Problem: Physical Therapy - Adult  Goal: By Discharge: Performs mobility at highest level of function for planned discharge setting.  See evaluation for individualized goals.  Description: Physical Therapy Short Term Goals  = Long Term Goals  Initiated 5/10/2024 and to be accomplished within 7-10 day(s) (2024)  1.  Patient will supine to sit, sit to supine, roll right, and roll left  in bed with modified independence.    2.  Patient will transfer from bed to chair and chair to bed with modified independence using the least restrictive device.  3.  Patient will perform sit to stand with modified independence  4.  Patient will ambulate with modified independence for 50 feet with the least restrictive device.   5.  Patient will ascend/descend 4 stairs with 1 handrail(s) with supervision/set-up.    Outcome: Progressing     PHYSICAL THERAPY TREATMENT    Patient: Amish Shetty (68 y.o. male)  Date: 2024  Diagnosis: Debility [R53.81]   Precautions: fall risk  Chart, physical therapy assessment, plan of care and goals were reviewed.  Time in: 1106  Time out: 1209    Time in: 1305  Time out : 1331    Patient seen for: gait training, stair training, therapeutic exercises, txfr training    Pain:  Pt pain was reported as no c/o pre-treatment.  Pt pain was reported as no c/o post-treatment.  Intervention: NA    Patient identified with name and : Yes    SUBJECTIVE:      Pt reports feeling like his energy and mobility is about 70% back to PLOF.    OBJECTIVE DATA SUMMARY:    Objective:   Education: Education Given To: Patient  Education Provided: Fall Prevention Strategies;Safety;Equipment;Transfer Training;Mobility Training  Education Method: Demonstration;Verbal  Barriers to Learning: Cognition  Education Outcome: Verbalized understanding;Demonstrated understanding;Continued education needed    TRANSFERS Daily Assessment    Sit to Stand Contact guard assistance;Minimal Assistance with pt pushing up from left arm  with rebekah technique with left LE and UE with CGA/min assist for steering and maintain propulsion.      THERAPEUTIC EXERCISES Daily Assessment     Standing marching, hip abd, hip extension, heel and toe raises and mini squats x15 each        ASSESSMENT:  Pt demo'd decreased receptiveness and carryover of cues for improving gait quality and sequencing for stair negotiation due to learned compensation and poor movement patterns.   Progression toward goals:  []      Improving appropriately and progressing toward goals  [x]      Improving slowly and progressing toward goals  []      Not making progress toward goals and plan of care will be adjusted      PLAN:  Patient continues to benefit from skilled intervention to address the above impairments.  Continue treatment per established plan of care.  Discharge Recommendations:  24 hour supervision or assist;Home with Home health PT;Outpatient PT  Further Equipment Recommendations for Discharge:                      Estimated Discharge Date: TBD    Activity Tolerance:   Fair+  Please refer to the flowsheet for vital signs taken during this treatment.    After treatment:   [] Patient left in no apparent distress in bed  [x] Patient left in no apparent distress sitting up in chair  [] Patient left in no apparent distress in w/c mobilizing under own power  [] Patient left in no apparent distress dining area  [] Patient left in no apparent distress mobilizing under own power  [x] Call bell left within reach  [] Nursing notified  [] Caregiver present  [] Bed alarm activated   [x] Chair alarm activated        Lisette Smith, FRANCISCO  5/13/2024

## 2024-05-13 NOTE — PLAN OF CARE
weight to twist off with CGA. Pt demonstrated increased difficulty opening pill box doors with increased time and unable to open last few tabs. Pt required education and demonstration for problem solving and technique secondary to holding same door down with fingers and pulling up with thumb. OTR provided demonstration to hold pill box with digits in open compartment next to tab and pushing tab up with thumb to open. Pt demonstrated improved performance and carryover with technique following demonstration. Pt demonstrated increased difficulty reading labels on bottles secondary to impaired vision and reported glasses being at home. Pt completed task with Ravi sorting 3 bottles into day/night pill box and fair accuracy dropping 2-3 \"pills\".      TOILETING Daily Assessment   Functional Level Contact guard assistance   Clinical factors Pt performed all toileting tasks using quad cane and CGA for toilet transfer and clothing management using LUE and F+ dynamic standing balance.     TOILET TRANSFER Daily Assessment   Transfer Technique Stand step   Level of Assistance Contact guard assistance   Equipment Standard bedside commode   Toilet Transfer Comments with SBQC and min VC's for sequencing and safety     FUNCTIONAL MOBILITY Daily Assessment    Functional - Mobility Device: Wheelchair  Activity: To/From therapy gym  Assist Level: Supervision  Functional Mobility Comments: and increased time secondary to fatigue.   Stand Step Transfers: Contact guard assistance  Sit to stand: Contact guard assistance  Stand to sit: Contact guard assistance  Transfer Comments: with quad cane    Activity Tolerance:  Patient limited by fatigue   with functional mobility     EDUCATION:   Education Given To: Patient  Education Provided: Fall Prevention Strategies;Safety;ADL Function  Education Method: Demonstration;Verbal  Barriers to Learning: Cognition  Education Outcome: Verbalized understanding;Demonstrated understanding;Continued  education needed    ASSESSMENT:  Pt is increasing BUE strength and FM coordination for carryover with ADL/IADL's and increasing independence and safety with functional mobility with quad cane.   Progression toward goals:  [x]          Improving appropriately and progressing toward goals  []          Improving slowly and progressing toward goals  []          Not making progress toward goals and plan of care will be adjusted       PLAN:  Patient continues to benefit from skilled intervention to address the above impairments.  Continue treatment per established plan of care.  Discharge Recommendations:  Home Health with 24 hr assistance  Further Equipment Recommendations for Discharge:     none at this time, dependent on progress   Estimated LOS:TBD     COMMUNICATION/EDUCATION:   [] Home safety education was provided and the patient/caregiver indicated understanding.  [] Patient/family have participated as able in goal setting and plan of care.  [x] Patient/family agree to work toward stated goals and plan of care.  [] Patient understands intent and goals of therapy, but is neutral about his/her participation.  [] Patient is unable to participate in goal setting and plan of care.    Please refer to the flowsheet for vital signs taken during this treatment.  After treatment:   [x]  Patient left in no apparent distress sitting up in chair   []  Patient left in no apparent distress in bed  []  Patient handoff to SLP/PT  [x]  Call bell and immediate needs left within reach  [x]  Nursing notified  []  Caregiver present  []  Bed alarm activated  []  Chair alarm activated  []  Franky Prominences offloaded  []  Heels activated for pressure relief  []  Telesitter/Care companion present      Entered Differentiated Treatment minutes: yes      Vielka Kern OT  5/13/2024

## 2024-05-13 NOTE — PROGRESS NOTES
Advance Care Planning   Healthcare Decision Maker:    Primary Decision Maker (Active): Mary Shetty - Spouse - [phone]    Secondary Decision Maker: Avis Esparza - Child - [phone]    Click here to complete Healthcare Decision Makers including selection of the Healthcare Decision Maker Relationship (ie \"Primary\").       conducted an initial consultation and Spiritual Assessment for Amish Shetty, who is a 68 y.o.,male. Patient's Primary Language is: English.   According to the patient's EMR Spiritism Affiliation is: Catholic.     The reason the Patient came to the hospital is:   Patient Active Problem List    Diagnosis Date Noted    Debility 05/09/2024    Acute pulmonary edema (HCC) 05/02/2024    Acute decompensated heart failure (HCC) 04/12/2024    Acute blood loss anemia 04/11/2024    Acute heart failure with preserved ejection fraction (HCC) 04/08/2024    Gastrointestinal hemorrhage with melena 04/08/2024    Palliative care encounter 03/21/2024    ACP (advance care planning) 03/21/2024    Goals of care, counseling/discussion 03/21/2024    DNR (do not resuscitate) discussion 03/21/2024    Acute on chronic anemia 03/17/2024    Nephrotic syndrome 02/04/2024    Edema 02/04/2024    Skin tear of forearm without complication, sequela 02/04/2024    Benign essential HTN 02/04/2024    Pericardial effusion 02/01/2024    Acute on chronic diastolic heart failure (HCC) 02/01/2024    History of CVA (cerebrovascular accident) 02/01/2024    Acute renal failure superimposed on stage 3b chronic kidney disease (HCC) 01/30/2024    Gout 01/30/2024    Atrial fibrillation (HCC) 11/03/2023    Proteinuria 11/02/2023    Obstructive sleep apnea on CPAP     Obesity, Class I, BMI 30-34.9     Hypertensive heart and kidney disease without heart failure and with stage 2 chronic kidney disease     Hypokalemia     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

## 2024-05-13 NOTE — PROGRESS NOTES
Pt is a 68 year old male admitted to ARU. Pt is off the floor for dialysis at this time. Sw spoke with pt's wife to complete initial interview.     Spouse verifies that the pt lives at home with his wife and sister in law. Pt's home is 1 level with 2 steps to enter with bilateral handrails and a tub shower.     Spouse states that she feels the pt has been self limiting at home and not taking the time to problem solve tasks. Spouse states that the pt was using a quad cane, high toilet seat and grab bar in the shower. Spouse states that the pt has used home care recently but is not able to recall the agency. Spouse states that the pt has been to [facility] after his last ARU admission. Spouse states that the pt has been referred to outpatient therapy but has never been able to start services because of readmissions to the hospital.     Pt sees Beth Cheema NP-C has his PCP and fills his medications at [facility] [address]     Spouse, Mary Shetty ([phone]) notes that she is the POA and NOK, though notes that their \"marriage is over\" but she is willing to assist the pt at MO to a limited degree.     Spouse affirms pt's insurance as medicare and bcbs supplement. Sw reviewed dc planning. Spouse states understanding and denies questions.     Sw will follow.

## 2024-05-13 NOTE — PROGRESS NOTES
[facility]  [address] [place] [id number]     INPATIENT REHABILITATION  DAILY PROGRESS NOTE     Date: 5/13/2024    Name: Amish Shetty Age / Sex: 68 y.o. / male   CSN: [id number] MRN: [id number]   Admit Date: 5/9/2024 Length of Stay: 4 days     Primary Rehabilitation Diagnosis: Impaired Mobility and ADLs secondary to debility due to end-stage renal disease and amyloidosis and prior CVA with residual right hemiparesis     Subjective:     Pt seen by me face to face today. Patient just returned back to room from participating in occupational therapy. Patient is no apparent distress. Patient denies and nausea, vomiting, diarrhea, SOB, headaches, or chest pain.     Patient states everything is going well. He states that he is getting used to the dialysis routine. Patient has no significant medical concerns.      Objective:     Vital Signs:  Patient Vitals for the past 24 hrs:   BP Temp Temp src Pulse Resp SpO2   05/13/24 0709 (!) 154/69 98 °F (36.7 °C) Oral 50 18 97 %   05/13/24 0647 (!) 147/64 -- -- 50 -- --   05/12/24 2049 134/69 97.5 °F (36.4 °C) Oral 56 20 99 %   05/12/24 1615 131/60 97.7 °F (36.5 °C) Oral 58 20 --          Physical Examination:  General:          Sitting in a chair in no acute distress.  HEENT:           Pupils equal.  Sclera anicteric.  Conjunctiva pink.  Mucous membranes                           Moist, no ear or nasal discharge  Neck:               Supple.  Trachea midline.  No accessory muscle use.  No thyromegaly.                           No jugular venous distention, no carotid bruit  CV:                  Regular rate and rhythm. S1S2+  Lungs:             Clear to auscultation bilaterally.  No Wheezing or Rhonchi. No rales.  Abdomen:        Soft, non-tender. Not distended.  Bowel sounds normal.   Extremities:     No cyanosis.  No lower extremity edema. Pulses 1+ b/l  Neurologic:      Alert and oriented X 3.  Follows commands, responds appropriately.   Has right hemiparesis  Skin:                Warm and dry.  No rashes.     Current Medications:  Current Facility-Administered Medications   Medication Dose Route Frequency    epoetin ole-epbx (RETACRIT) injection 6,000 Units  6,000 Units SubCUTAneous Once per day on Mon Wed Fri    hydrALAZINE (APRESOLINE) tablet 10 mg  10 mg Oral Q6H PRN    heparin (porcine) injection 3,800 Units  3,800 Units IntraCATHeter PRN    aspirin chewable tablet 81 mg  81 mg Oral Daily with breakfast    atorvastatin (LIPITOR) tablet 40 mg  40 mg Oral Nightly    Virt-Caps 1 mg  1 capsule Oral Daily    bumetanide (BUMEX) tablet 2 mg  2 mg Oral BID    ferrous sulfate (IRON 325) tablet 325 mg  325 mg Oral Daily with breakfast    finasteride (PROSCAR) tablet 5 mg  5 mg Oral Daily    fluticasone (FLONASE) 50 MCG/ACT nasal spray 2 spray  2 spray Each Nostril Daily    hydrALAZINE (APRESOLINE) tablet 100 mg  100 mg Oral 3 times per day    isosorbide dinitrate (ISORDIL) tablet 40 mg  40 mg Oral TID    losartan (COZAAR) tablet 100 mg  100 mg Oral Daily    sertraline (ZOLOFT) tablet 50 mg  50 mg Oral Daily    acetaminophen (TYLENOL) tablet 650 mg  650 mg Oral Q4H PRN    polyethylene glycol (GLYCOLAX) packet 17 g  17 g Oral Daily PRN    heparin (porcine) injection 5,000 Units  5,000 Units SubCUTAneous 3 times per day       Allergies:  No Known Allergies    Lab/Data Review:  Recent Results (from the past 24 hour(s))   CBC    Collection Time: 05/13/24  5:29 AM   Result Value Ref Range    WBC 8.9 4.6 - 13.2 K/uL    RBC 3.10 (L) 4.35 - 5.65 M/uL    Hemoglobin 8.2 (L) 13.0 - 16.0 g/dL    Hematocrit 27.3 (L) 36.0 - 48.0 %    MCV 88.1 78.0 - 100.0 FL    MCH 26.5 24.0 - 34.0 PG    MCHC 30.0 (L) 31.0 - 37.0 g/dL    RDW 17.4 (H) 11.6 - 14.5 %    Platelets 304 135 - 420 K/uL    MPV 10.3 9.2 - 11.8 FL    Nucleated RBCs 0.0 0  WBC    nRBC 0.00 0.00 - 0.01 K/uL   Basic Metabolic Panel    Collection Time: 05/13/24  5:29 AM   Result Value Ref Range    Sodium 137

## 2024-05-13 NOTE — DIALYSIS
HD Care plan  Time: 3 hrs  Dialysate: 3 K+   2.5 Ca++  Bath  Net UF: 3 L  Access: Aseptic care for RT Chest TDC  Hemodynamic stability: Maintain BP WNL     Pre Dialysis:  Patient received from Susan Rosales RN, Patient on a bed, A+O x 4, on RA  no s/s of acute distress noted. RT Chest TDC  assessed, dressing clean and intact. TDC accessed per protocol without any difficulty, line patent with good flow.     Intra Dialysis:  Time out / safety process performed per policy. Tx initiated at 1515.    TDC flowing with ease. For hemodynamic stability UF goal set at 3000 ml as tolerated.  Pt offered assistance with repositioning every 2 hours/prn    Vascular access visible and line connections remained intact throughout entire duration of treatment.   Vital signs checked every 15 mins.     Post Dialysis:  Tx completed at 1815,   Tolerated well, 3.0 L  removed.  De-accessed per protocol.    Dialysis catheter locked accordingly with Heparin 1.9 ml in arterial port, and 1.9 ml in venous port. Catheter dressing clean, dry and intact.  Post Dialysis report given to FRANSISCA Davis

## 2024-05-13 NOTE — PROGRESS NOTES
RENAL DAILY PROGRESS NOTE            68-year-old male with past medical history of renal myelomatosis stroke hypertensions heart failure history of GI bleed admitted for short of breath following for renal failure  Subjective:       Complaint:   His breathing is at baseline.  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  Hypertension  Hypokalemia  Proteinuria  Hx diabetes  Hx CVA  Anemia,followed by hematology as OP, on epogen  Hx iron deficiency   PLAN:   Plan for dialysis  today    with 3K bath, UF goal 1.5 to 2 L as tolerated.  Continue diuretics.    Awaiting confirmation for change in his outpatient dialysis chair on [facility] schedule.   Has a [facility] at 12:30 pm. He needs to come at 12:00 for the first visit.             Current Facility-Administered Medications   Medication Dose Route Frequency    epoetin ole-epbx (RETACRIT) injection 6,000 Units  6,000 Units SubCUTAneous Once per day on Mon Wed Fri    hydrALAZINE (APRESOLINE) tablet 10 mg  10 mg Oral Q6H PRN    heparin (porcine) injection 3,800 Units  3,800 Units IntraCATHeter PRN    aspirin chewable tablet 81 mg  81 mg Oral Daily with breakfast    atorvastatin (LIPITOR) tablet 40 mg  40 mg Oral Nightly    Virt-Caps 1 mg  1 capsule Oral Daily    bumetanide (BUMEX) tablet 2 mg  2 mg Oral BID    ferrous sulfate (IRON 325) tablet 325 mg  325 mg Oral Daily with breakfast    finasteride (PROSCAR) tablet 5 mg  5 mg Oral Daily    fluticasone (FLONASE) 50 MCG/ACT nasal spray 2 spray  2 spray Each Nostril Daily    hydrALAZINE (APRESOLINE) tablet 100 mg  100 mg Oral 3 times per day    isosorbide

## 2024-05-14 PROCEDURE — 1180000000 HC REHAB R&B

## 2024-05-14 PROCEDURE — 97530 THERAPEUTIC ACTIVITIES: CPT

## 2024-05-14 PROCEDURE — 97110 THERAPEUTIC EXERCISES: CPT

## 2024-05-14 PROCEDURE — 97112 NEUROMUSCULAR REEDUCATION: CPT

## 2024-05-14 PROCEDURE — 99232 SBSQ HOSP IP/OBS MODERATE 35: CPT | Performed by: EMERGENCY MEDICINE

## 2024-05-14 PROCEDURE — 97116 GAIT TRAINING THERAPY: CPT

## 2024-05-14 PROCEDURE — 6370000000 HC RX 637 (ALT 250 FOR IP): Performed by: EMERGENCY MEDICINE

## 2024-05-14 PROCEDURE — 6360000002 HC RX W HCPCS: Performed by: EMERGENCY MEDICINE

## 2024-05-14 PROCEDURE — 97535 SELF CARE MNGMENT TRAINING: CPT

## 2024-05-14 PROCEDURE — 94761 N-INVAS EAR/PLS OXIMETRY MLT: CPT

## 2024-05-14 RX ADMIN — ISOSORBIDE DINITRATE 40 MG: 20 TABLET ORAL at 08:58

## 2024-05-14 RX ADMIN — HEPARIN SODIUM 5000 UNITS: 5000 INJECTION INTRAVENOUS; SUBCUTANEOUS at 14:00

## 2024-05-14 RX ADMIN — SERTRALINE 50 MG: 50 TABLET, FILM COATED ORAL at 09:00

## 2024-05-14 RX ADMIN — BUMETANIDE 2 MG: 1 TABLET ORAL at 17:00

## 2024-05-14 RX ADMIN — LOSARTAN POTASSIUM 100 MG: 50 TABLET, FILM COATED ORAL at 08:59

## 2024-05-14 RX ADMIN — ASPIRIN 81 MG CHEWABLE TABLET 81 MG: 81 TABLET CHEWABLE at 08:58

## 2024-05-14 RX ADMIN — Medication 1 MG: at 08:59

## 2024-05-14 RX ADMIN — BUMETANIDE 2 MG: 1 TABLET ORAL at 08:59

## 2024-05-14 RX ADMIN — HYDRALAZINE HYDROCHLORIDE 100 MG: 50 TABLET ORAL at 23:11

## 2024-05-14 RX ADMIN — HEPARIN SODIUM 5000 UNITS: 5000 INJECTION INTRAVENOUS; SUBCUTANEOUS at 06:11

## 2024-05-14 RX ADMIN — HYDRALAZINE HYDROCHLORIDE 100 MG: 50 TABLET ORAL at 06:11

## 2024-05-14 RX ADMIN — FINASTERIDE 5 MG: 5 TABLET, FILM COATED ORAL at 09:00

## 2024-05-14 RX ADMIN — ATORVASTATIN CALCIUM 40 MG: 40 TABLET, FILM COATED ORAL at 21:22

## 2024-05-14 RX ADMIN — ISOSORBIDE DINITRATE 40 MG: 20 TABLET ORAL at 14:55

## 2024-05-14 RX ADMIN — ISOSORBIDE DINITRATE 40 MG: 20 TABLET ORAL at 21:23

## 2024-05-14 RX ADMIN — FERROUS SULFATE TAB 325 MG (65 MG ELEMENTAL FE) 325 MG: 325 (65 FE) TAB at 09:00

## 2024-05-14 RX ADMIN — HYDRALAZINE HYDROCHLORIDE 100 MG: 50 TABLET ORAL at 14:55

## 2024-05-14 RX ADMIN — HEPARIN SODIUM 5000 UNITS: 5000 INJECTION INTRAVENOUS; SUBCUTANEOUS at 21:31

## 2024-05-14 ASSESSMENT — PAIN SCALES - GENERAL
PAINLEVEL_OUTOF10: 0

## 2024-05-14 NOTE — PLAN OF CARE
Problem: Occupational Therapy - Adult  Goal: By Discharge: Performs self-care activities at highest level of function for planned discharge setting.  See evaluation for individualized goals.  Description: Occupational Therapy Goals   Long Term Goals  Initiated 5/10/24 and to be accomplished within 2 week(s)  1. Pt will perform self-feeding with Ginger.  2. Pt will perform grooming with Ginger.  3. Pt will perform UB bathing with supervision.  4. Pt will perform LB bathing with supervision.  5. Pt will perform tub/shower transfer with CGA.   6. Pt will perform UB dressing with setup.  7. Pt will perform LB dressing with supervision.  8. Pt will perform toileting task with CGA.  9. Pt will perform toilet transfer with CGA.      Short Term Goals   Initiated 5/10/24 and to be accomplished within 7 day(s)  1. Pt will perform self-feeding with setup.   2. Pt will perform grooming with setup.  3. Pt will perform UB bathing with Ravi.  4. Pt will perform LB bathing with CGA.  5. Pt will perform tub/shower transfer with Ravi.  6. Pt will perform UB dressing with SBA.  7. Pt will perform LB dressing with CGA.  8. Pt will perform toileting task with Ravi.  9. Pt will perform toilet transfer with Ravi.    Outcome: Progressing   Occupational Therapy TREATMENT    Patient: Amish Shetty   68 y.o.    Patient identified with name and : yes    Date: 2024    First Tx Session  Time In: 1016  Time Out: 1100    Second Tx Session  Time In: 1016  Time Out: 1100    Third Tx Session  Time In: 0905  Time Out: 0915    Diagnosis: Debility [R53.81]   Precautions:  Restrictions/Precautions: Fall Risk  Required Braces or Orthoses?: No                             Chart, occupational therapy assessment, plan of care, and goals were reviewed.     Pain:  Pt reports 0/10 pain or discomfort prior to treatment.    Pt reports -/10 pain or discomfort post treatment.   Intervention Provided:     SUBJECTIVE:   Patient stated “Let's do something I don'[t  Daily Assessment   Transfer Technique Stand step   Level of Assistance Contact guard assistance   Equipment Bed;Wheelchair   Comments with quad cane and mod VC's for safety and technique to decrease risks for falls.     Activity Tolerance:  Patient Tolerated treatment well         EDUCATION:   Education Given To: Patient  Education Provided: Fall Prevention Strategies;Safety;Equipment;ADL Function;Transfer Training;Mobility Training  Education Method: Demonstration;Verbal  Barriers to Learning: Cognition  Education Outcome: Verbalized understanding;Demonstrated understanding;Continued education needed    ASSESSMENT:  Pt is making progress increasing RUE and LUE strength for carryover with ADL/IADL's. Pt demonstrated good carryover of one handed techniques with one handed showering however demonstrated decreased safety awareness with all self cares and functional mobility with SBQC. Pt frequently prefers to go to gym versus performing self cares for sake of OT however following education pt was agreeable and grateful.   Progression toward goals:  [x]          Improving appropriately and progressing toward goals  []          Improving slowly and progressing toward goals  []          Not making progress toward goals and plan of care will be adjusted       PLAN:  Patient continues to benefit from skilled intervention to address the above impairments.  Continue treatment per established plan of care.  Discharge Recommendations:  Home Health with 24 hr assistance  Further Equipment Recommendations for Discharge:     none at this time, dependent on progress   Estimated LOS:TBD     COMMUNICATION/EDUCATION:   [] Home safety education was provided and the patient/caregiver indicated understanding.  [] Patient/family have participated as able in goal setting and plan of care.  [x] Patient/family agree to work toward stated goals and plan of care.  [] Patient understands intent and goals of therapy, but is neutral about his/her

## 2024-05-14 NOTE — PLAN OF CARE
Problem: Chronic Conditions and Co-morbidities  Goal: Patient's chronic conditions and co-morbidity symptoms are monitored and maintained or improved  5/13/2024 2008 by Carri Garcia RN  Outcome: Progressing  5/13/2024 0726 by Susan Rosales RN  Outcome: Progressing     Problem: Safety - Adult  Goal: Free from fall injury  5/13/2024 2008 by Carri Garcia RN  Outcome: Progressing  5/13/2024 0726 by Susan Rosales RN  Outcome: Progressing

## 2024-05-14 NOTE — PROGRESS NOTES
[facility] PHYSICAL REHABILITATION  [address] [place] [id number]     INPATIENT REHABILITATION  DAILY PROGRESS NOTE     Date: 5/14/2024    Name: Amish Shetty Age / Sex: 68 y.o. / male   CSN: [id number] MRN: [id number]   Admit Date: 5/9/2024 Length of Stay: 5 days     Primary Rehabilitation Diagnosis: Impaired Mobility and ADLs secondary to debility due to end-stage renal disease and amyloidosis and prior CVA with residual right hemiparesis     Subjective:     I personally saw and evaluated this patient face-to-face today.  Patient is sitting in a chair in no apparent distress.  Patient denies any chest pain or shortness of breath.  Patient is in good spirits.    Objective:     Vital Signs:  Patient Vitals for the past 24 hrs:   BP Temp Temp src Pulse Resp SpO2   05/14/24 1601 (!) 127/54 97.6 °F (36.4 °C) Oral 73 18 96 %   05/14/24 1415 (!) 133/58 -- -- 59 -- --   05/14/24 0800 (!) 150/63 97.7 °F (36.5 °C) Oral 53 18 97 %   05/14/24 0600 (!) 158/61 -- -- 58 -- --   05/13/24 2200 (!) 158/62 97.2 °F (36.2 °C) Oral 53 -- 98 %   05/13/24 1830 (!) 153/85 -- -- 50 -- --   05/13/24 1815 (!) 167/81 98.1 °F (36.7 °C) -- 50 18 --   05/13/24 1800 (!) 149/86 -- -- 50 -- --   05/13/24 1745 (!) 149/79 -- -- 50 -- --   05/13/24 1730 132/82 -- -- 56 -- --   05/13/24 1715 131/71 -- -- 50 -- --        Physical Examination:  General:  Awake, alert  Cardiovascular:  S1S2+, RRR  Pulmonary:  CTA b/l  GI:  Soft, BS+, NT, ND  Extremities:  trace edema  Right hemiparesis from prior stroke      Current Medications:  Current Facility-Administered Medications   Medication Dose Route Frequency    epoetin ole-epbx (RETACRIT) injection 6,000 Units  6,000 Units SubCUTAneous Once per day on Mon Wed Fri    hydrALAZINE (APRESOLINE) tablet 10 mg  10 mg Oral Q6H PRN    heparin (porcine) injection 3,800 Units  3,800 Units IntraCATHeter PRN    aspirin chewable tablet 81 mg  81 mg Oral Daily with breakfast    atorvastatin (LIPITOR)  nursing is following:    [x]  Fall and safety precautions     []  Wound Care     [x]  Bowel and Bladder Function     [x]  Fluid Electrolyte and Nutrition Balance     []  Pain Control      [x]  Assistance with and education on in-room safety with transfers to and from the bed, wheelchair, toilet and shower.      4. Acute rehabilitation plan of care:    [x]  Continue current care and rehab.           [x]  Physical Therapy           [x]  Occupational Therapy           []  Speech Therapy      []  Hold Rehab until further notice     5. Medications:    [x]  MAR Reviewed     [x]  Continue Present Medications       6. Chemical DVT Prophylaxis:      []  Enoxaparin     []  Unfractionated Heparin     []  Warfarin     []  NOAC     [x]  Aspirin     []  None     7. Mechanical DVT Prophylaxis:      [x]  AMBER Stockings     [x]  Sequential Compression Device     []  None     8. GI Prophylaxis:      []  PPI     []  H2 Blocker     [x]  None / Not indicated     9. Code status:Full      Dragon medical dictation software was used for portions of this report. Unintended errors may occur.    Personal Protective Equipment ( face mask ) was used while interacting with the patient      Signed:    Clinton Zamora MD      May 14, 2024

## 2024-05-14 NOTE — PLAN OF CARE
Problem: Chronic Conditions and Co-morbidities  Goal: Patient's chronic conditions and co-morbidity symptoms are monitored and maintained or improved  Outcome: Progressing     Problem: Safety - Adult  Goal: Free from fall injury  Outcome: Progressing     Problem: Skin/Tissue Integrity  Goal: Absence of new skin breakdown  Description: 1.  Monitor for areas of redness and/or skin breakdown  2.  Assess vascular access sites hourly  3.  Every 4-6 hours minimum:  Change oxygen saturation probe site  4.  Every 4-6 hours:  If on nasal continuous positive airway pressure, respiratory therapy assess nares and determine need for appliance change or resting period.  Outcome: Progressing     Problem: Pain  Goal: Verbalizes/displays adequate comfort level or baseline comfort level  Outcome: Progressing

## 2024-05-14 NOTE — PROGRESS NOTES
RENAL DAILY PROGRESS NOTE            68-year-old male with past medical history of renal myelomatosis stroke hypertensions heart failure history of GI bleed admitted for short of breath following for renal failure  Subjective:       Complaint:   His breathing is at baseline.  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  Hypertension  Hypokalemia  Proteinuria  Hx diabetes  Hx CVA  Anemia,followed by hematology as OP, on epogen  Hx iron deficiency   PLAN:   Plan to continue dialysis on MWF schedule  with 3K bath, UF goal 1.5 to 2 L as tolerated.  Continue diuretics.    He has now new place for dialysis at [facility], MWF at 10.15am.   Adjust medication per ESRD status.              Current Facility-Administered Medications   Medication Dose Route Frequency    epoetin ole-epbx (RETACRIT) injection 6,000 Units  6,000 Units SubCUTAneous Once per day on Mon Wed Fri    hydrALAZINE (APRESOLINE) tablet 10 mg  10 mg Oral Q6H PRN    heparin (porcine) injection 3,800 Units  3,800 Units IntraCATHeter PRN    aspirin chewable tablet 81 mg  81 mg Oral Daily with breakfast    atorvastatin (LIPITOR) tablet 40 mg  40 mg Oral Nightly    Virt-Caps 1 mg  1 capsule Oral Daily    bumetanide (BUMEX) tablet 2 mg  2 mg Oral BID    ferrous sulfate (IRON 325) tablet 325 mg  325 mg Oral Daily with breakfast    finasteride (PROSCAR) tablet 5 mg  5 mg Oral Daily    fluticasone (FLONASE) 50 MCG/ACT nasal spray 2 spray  2 spray Each Nostril Daily    hydrALAZINE (APRESOLINE) tablet 100 mg  100 mg Oral 3 times per day    isosorbide dinitrate (ISORDIL) tablet 40 mg  40 mg

## 2024-05-14 NOTE — PLAN OF CARE
Problem: Physical Therapy - Adult  Goal: By Discharge: Performs mobility at highest level of function for planned discharge setting.  See evaluation for individualized goals.  Description: Physical Therapy Short Term Goals  = Long Term Goals  Initiated 5/10/2024 and to be accomplished within 7-10 day(s) (2024)  1.  Patient will supine to sit, sit to supine, roll right, and roll left  in bed with modified independence.    2.  Patient will transfer from bed to chair and chair to bed with modified independence using the least restrictive device.  3.  Patient will perform sit to stand with modified independence  4.  Patient will ambulate with modified independence for 50 feet with the least restrictive device.   5.  Patient will ascend/descend 4 stairs with 1 handrail(s) with supervision/set-up.    Outcome: Progressing     PHYSICAL THERAPY TREATMENT    Patient: Amish Shetty (68 y.o. male)  Date: 2024  Diagnosis: Debility [R53.81]   Precautions: fall risk  Chart, physical therapy assessment, plan of care and goals were reviewed.    Time in: 1333  Time out : 1540    Patient seen for: w/c mobility, stair training, gait training, txfr training, bed mobility, therapeutic exercises, balance activities    Pain:  Pt pain was reported as no c/o pre-treatment.  Pt pain was reported as no c/o post-treatment.  Intervention: NA    Patient identified with name and : Yes    SUBJECTIVE:      Pt reports wanting to strengthen his left leg and wanting to walk more.     OBJECTIVE DATA SUMMARY:    Objective:   Education: Education Given To: Patient  Education Provided: Mobility Training;Transfer Training;Safety  Education Method: Demonstration;Verbal  Barriers to Learning: Cognition (long term learned compensation)  Education Outcome: Verbalized understanding;Demonstrated understanding;Continued education needed  BED/MAT MOBILITY Daily Assessment    Rolling Right      Rolling Left Modified independent    Supine to Sit  apparent distress sitting up in chair  [] Patient left in no apparent distress in w/c mobilizing under own power  [] Patient left in no apparent distress dining area  [] Patient left in no apparent distress mobilizing under own power  [x] Call bell left within reach  [] Nursing notified  [] Caregiver present  [] Bed alarm activated   [x] Chair alarm activated        Lisette Smith, PTA  5/14/2024

## 2024-05-14 NOTE — PROGRESS NOTES
[facility] Physical Rehabilitation  Team Conference  Date: 5/14/2024  ACTIVE MONITORING OF CO-MORBID CONDITIONS/MANAGEMENT OF NEW MEDICAL ISSUES: Debility [R53.81]    **Please refer to patient's electronic medical record to view the care plan and goals**  NURSING Making gains Yes   Skin Care: Skin Integumentary   Skin Color: Pink  Skin Condition/Temp: Dry, Warm  Skin Integrity: Excoriation, Wound (see LDA)  Location: sacrum  Skin Fold Management: No    Wound Care:  silicone dsd      Pain: Pain Assessment  Pain Assessment: 0-10 (05/14/24 0800)  Pain Level: 0 (05/14/24 0800)  Hagen-Gale Pain Rating: No hurt (05/10/24 1200)  Patient's Stated Pain Goal: 0 - No pain (05/14/24 0400)    Bladder/Bowel Urine Assessment  Urinary Status: Voiding  Urinary Incontinence: Absent  Urine Color: Yellow/straw  Urine Appearance: Clear  Urine Odor: No odor Stool Assessment  Incontinence: No  Stool Appearance: Soft  Stool Color: Brown  Stool Amount: Medium  Stool Source: Rectum  Last BM (including prior to admit): 05/12/24   Goal: patient will be free from falls by discharge      Barrier: disability r/t ESRD      Intervention: fall precautions education      Lab value concerns   Yes       Occupational Therapy  Making gains  Yes   Goal: Patient will perform LB bathing with guarding assistance      Barrier: decreased awareness & standing balance      Intervention: ADL training, safety ed.      ADL Accessibility Limitations: bathroom not handi-accessible       Physical Therapy Making gains Yes   Goal: Patient will perform transfers mod-I with a small based quad cane      Barrier: decreased receptiveness, long term learned compensation      Intervention: transfer training, gait training      Household Mobility Accessibility Limitations: 3 steps to enter, bilat. handrails                               Therapy Minutes Density Met: Yes    Therapy Minutes Not met Action/Justification:   [] Pt on medical hold  [] Pt refusing therapy  despite encouragement and education on benefits of therapy  [] Pt displays decreased tolerance to therapy  [] Other     CMG Date: 5/21  Estimated Discharge Date: 5/22  []Rehabilitation goals from IPOC/Treatment plan reviewed  [x]No changes identified  []Goal(s) changed:     Patient needs identified [x]Caregiver Education   []Family Conference         Recommended Discharge Plan []home alone   []home alone with assist prn    []Home with continuous supervision       [x]Home with continuous assistance   [] Assisted living                     []SNF   Home Health and Home Physical Therapy/ OT    CURRENT EQUIPMENT NEEDS:  []Handicap Placard Application    Equipment needed at discharge: N/A (has equipment)    ADL Equipment:Tub bench and 3 in 1 BSC    Plan/Adjustments to Plan   [x]Medical conditions exist that require a minimum of 3 times/week physician      oversight and 24-hour rehabilitation nursing to manage/progress the plan of care   [x]Functional deficits require intensive and coordinated therapies to achieve   goals outlined in plan of care   [x]3 hours therapy 5 days/week OR 15 hours therapy over 7 days   [x]Continued plan of care as patient is showing progress and /or has an expectation to benefit    Patient's plan of care has been reviewed and/or adjusted. Continue treatment as outlined.   I have led this Team Conference and agree with the plan, Clinton Zamora MD, 5/14/2024, 1:11 PM      Team Conference Recorder Marlon Mckeon RN  Date 5/14/2024    Team members participating in today's conference.   [x] Rubi Padgett, PT     [x] Wicho Spencer, PT           [x]    Yoli Viera, SLP       [x] Vielka Barnett, OT      [x] Susy Soto, OT                    [x]  Shiela Larkin, SLP  [] Marguerite Diane, EMMANUEL        [x] Marlon Mckeon RN             [x] RN: Nadeen Lenz  [x] Gertrudis Stewart NP                               [x] Other: Lisette Smith PTA

## 2024-05-15 PROCEDURE — 97116 GAIT TRAINING THERAPY: CPT

## 2024-05-15 PROCEDURE — 97110 THERAPEUTIC EXERCISES: CPT

## 2024-05-15 PROCEDURE — 6360000002 HC RX W HCPCS: Performed by: INTERNAL MEDICINE

## 2024-05-15 PROCEDURE — 1180000000 HC REHAB R&B

## 2024-05-15 PROCEDURE — 97530 THERAPEUTIC ACTIVITIES: CPT

## 2024-05-15 PROCEDURE — 97535 SELF CARE MNGMENT TRAINING: CPT

## 2024-05-15 PROCEDURE — 90935 HEMODIALYSIS ONE EVALUATION: CPT

## 2024-05-15 PROCEDURE — 99222 1ST HOSP IP/OBS MODERATE 55: CPT | Performed by: NURSE PRACTITIONER

## 2024-05-15 PROCEDURE — 97112 NEUROMUSCULAR REEDUCATION: CPT

## 2024-05-15 PROCEDURE — 6360000002 HC RX W HCPCS: Performed by: EMERGENCY MEDICINE

## 2024-05-15 PROCEDURE — 6370000000 HC RX 637 (ALT 250 FOR IP): Performed by: EMERGENCY MEDICINE

## 2024-05-15 RX ADMIN — LOSARTAN POTASSIUM 100 MG: 50 TABLET, FILM COATED ORAL at 07:45

## 2024-05-15 RX ADMIN — SERTRALINE 50 MG: 50 TABLET, FILM COATED ORAL at 07:45

## 2024-05-15 RX ADMIN — ISOSORBIDE DINITRATE 40 MG: 20 TABLET ORAL at 21:12

## 2024-05-15 RX ADMIN — FINASTERIDE 5 MG: 5 TABLET, FILM COATED ORAL at 07:45

## 2024-05-15 RX ADMIN — BUMETANIDE 2 MG: 1 TABLET ORAL at 07:45

## 2024-05-15 RX ADMIN — Medication 1 MG: at 07:45

## 2024-05-15 RX ADMIN — ATORVASTATIN CALCIUM 40 MG: 40 TABLET, FILM COATED ORAL at 21:12

## 2024-05-15 RX ADMIN — HYDRALAZINE HYDROCHLORIDE 100 MG: 50 TABLET ORAL at 21:11

## 2024-05-15 RX ADMIN — FERROUS SULFATE TAB 325 MG (65 MG ELEMENTAL FE) 325 MG: 325 (65 FE) TAB at 07:45

## 2024-05-15 RX ADMIN — ASPIRIN 81 MG CHEWABLE TABLET 81 MG: 81 TABLET CHEWABLE at 07:45

## 2024-05-15 RX ADMIN — BUMETANIDE 2 MG: 1 TABLET ORAL at 17:38

## 2024-05-15 RX ADMIN — HYDRALAZINE HYDROCHLORIDE 10 MG: 10 TABLET ORAL at 17:55

## 2024-05-15 RX ADMIN — HYDRALAZINE HYDROCHLORIDE 100 MG: 50 TABLET ORAL at 06:00

## 2024-05-15 RX ADMIN — EPOETIN ALFA-EPBX 6000 UNITS: 3000 INJECTION, SOLUTION INTRAVENOUS; SUBCUTANEOUS at 21:29

## 2024-05-15 RX ADMIN — HEPARIN SODIUM 5000 UNITS: 5000 INJECTION INTRAVENOUS; SUBCUTANEOUS at 21:12

## 2024-05-15 RX ADMIN — ISOSORBIDE DINITRATE 40 MG: 20 TABLET ORAL at 07:45

## 2024-05-15 RX ADMIN — HEPARIN SODIUM 5000 UNITS: 5000 INJECTION INTRAVENOUS; SUBCUTANEOUS at 06:00

## 2024-05-15 ASSESSMENT — PAIN SCALES - GENERAL
PAINLEVEL_OUTOF10: 0

## 2024-05-15 NOTE — PROGRESS NOTES
RENAL DAILY PROGRESS NOTE            68-year-old male with past medical history of renal myelomatosis stroke hypertensions heart failure history of GI bleed admitted for short of breath following for renal failure  Subjective:       Complaint:   His breathing is at baseline.  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  Hypertension  Hypokalemia, improving   Proteinuria  Hx diabetes  Hx CVA  Anemia,followed by hematology as OP, on epogen  Hx iron deficiency   PLAN:   Plan for dialysis today   with 3K bath, UF goal 1.5 to 2 L as tolerated.  Continue diuretics.    He has now new place for dialysis at [facility] at 10.15am.   Adjust medication per ESRD status.              Current Facility-Administered Medications   Medication Dose Route Frequency    epoetin ole-epbx (RETACRIT) injection 6,000 Units  6,000 Units SubCUTAneous Once per day on Mon Wed Fri    hydrALAZINE (APRESOLINE) tablet 10 mg  10 mg Oral Q6H PRN    heparin (porcine) injection 3,800 Units  3,800 Units IntraCATHeter PRN    aspirin chewable tablet 81 mg  81 mg Oral Daily with breakfast    atorvastatin (LIPITOR) tablet 40 mg  40 mg Oral Nightly    Virt-Caps 1 mg  1 capsule Oral Daily    bumetanide (BUMEX) tablet 2 mg  2 mg Oral BID    ferrous sulfate (IRON 325) tablet 325 mg  325 mg Oral Daily with breakfast    finasteride (PROSCAR) tablet 5 mg  5 mg Oral Daily    fluticasone (FLONASE) 50 MCG/ACT nasal spray 2 spray  2 spray Each Nostril Daily    hydrALAZINE (APRESOLINE) tablet 100 mg  100 mg Oral 3 times per day    isosorbide dinitrate (ISORDIL) tablet 40 mg  40 mg Oral TID    losartan

## 2024-05-15 NOTE — PLAN OF CARE
Problem: Occupational Therapy - Adult  Goal: By Discharge: Performs self-care activities at highest level of function for planned discharge setting.  See evaluation for individualized goals.  Description: Occupational Therapy Goals   Long Term Goals  Initiated 5/10/24 and to be accomplished within 2 week(s)  1. Pt will perform self-feeding with Ginger.  2. Pt will perform grooming with Ginger.  3. Pt will perform UB bathing with supervision.  4. Pt will perform LB bathing with supervision.  5. Pt will perform tub/shower transfer with CGA.   6. Pt will perform UB dressing with setup.  7. Pt will perform LB dressing with supervision.  8. Pt will perform toileting task with CGA.  9. Pt will perform toilet transfer with CGA.      Short Term Goals   Initiated 5/10/24 and to be accomplished within 7 day(s)  1. Pt will perform self-feeding with setup.   2. Pt will perform grooming with setup.  3. Pt will perform UB bathing with Ravi.  4. Pt will perform LB bathing with CGA.  5. Pt will perform tub/shower transfer with Ravi.  6. Pt will perform UB dressing with SBA.  7. Pt will perform LB dressing with CGA.  8. Pt will perform toileting task with Ravi.  9. Pt will perform toilet transfer with Ravi.    5/15/2024 1429 by Vielka Kern OT  Outcome: Progressing  Occupational Therapy TREATMENT    Patient: Amish Shetty   68 y.o.    Patient identified with name and : yes    Date: 5/15/2024    First Tx Session  Time In: 1104  Time Out: 1205    Second Tx Session  Time In: 0820  Time Out: 0903    Diagnosis: Debility [R53.81]   Precautions:  Restrictions/Precautions: Fall Risk  Required Braces or Orthoses?: No                             Chart, occupational therapy assessment, plan of care, and goals were reviewed.     Pain:  Pt reports 0/10 pain or discomfort prior to treatment.    Pt reports -/10 pain or discomfort post treatment.   Intervention Provided:     SUBJECTIVE:   Patient stated “Yes tayla I'll do it now so I can go to  carryover of techniques with min-max VC's for safety and sequencing. Pt frequently demonstrates impaired safety awareness and impaired STR of techniques for increased safety with functional mobility. Pt demonstrated increased independence this date with UB showering using rebekah techniques and AE for UB/LB showering.   Progression toward goals:  []          Improving appropriately and progressing toward goals  [x]          Improving slowly and progressing toward goals  []          Not making progress toward goals and plan of care will be adjusted       PLAN:  Patient continues to benefit from skilled intervention to address the above impairments.  Continue treatment per established plan of care.  Discharge Recommendations:  Home Health with 24 hr assistance  Further Equipment Recommendations for Discharge:     none at this time, dependent on progress   Estimated LOS:TBD     COMMUNICATION/EDUCATION:   [] Home safety education was provided and the patient/caregiver indicated understanding.  [] Patient/family have participated as able in goal setting and plan of care.  [x] Patient/family agree to work toward stated goals and plan of care.  [] Patient understands intent and goals of therapy, but is neutral about his/her participation.  [] Patient is unable to participate in goal setting and plan of care.    Please refer to the flowsheet for vital signs taken during this treatment.  After treatment:   [x]  Patient left in no apparent distress sitting up in chair   []  Patient left in no apparent distress in bed  []  Patient handoff to SLP/PT  [x]  Call bell and immediate needs left within reach  []  Nursing notified  []  Caregiver present  []  Bed alarm activated  []  Chair alarm activated  []  Franky Prominences offloaded  []  Heels activated for pressure relief  []  Telesitter/Care companion present      Entered Differentiated Treatment minutes: yes      Vielka Kern OT  5/15/2024

## 2024-05-15 NOTE — PLAN OF CARE
Problem: Physical Therapy - Adult  Goal: By Discharge: Performs mobility at highest level of function for planned discharge setting.  See evaluation for individualized goals.  Description: Physical Therapy Short Term Goals  = Long Term Goals  Initiated 5/10/2024 and to be accomplished within 7-10 day(s) (2024)  1.  Patient will supine to sit, sit to supine, roll right, and roll left  in bed with modified independence.    2.  Patient will transfer from bed to chair and chair to bed with modified independence using the least restrictive device.  3.  Patient will perform sit to stand with modified independence  4.  Patient will ambulate with modified independence for 50 feet with the least restrictive device.   5.  Patient will ascend/descend 4 stairs with 1 handrail(s) with supervision/set-up.    Outcome: Progressing     PHYSICAL THERAPY TREATMENT    Patient: Amish Shetty (68 y.o. male)  Date: 5/15/2024  Diagnosis: Debility [R53.81]   Precautions: fall risk  Chart, physical therapy assessment, plan of care and goals were reviewed.    Time in: 0942  Time out : 1108    Patient seen for: w/c mobility, txfr training, gait training, stair training, neuro re-ed/balance    Pain:  Pt pain was reported as no c/o pre-treatment.  Pt pain was reported as no c/o post-treatment.  Intervention: NA    Patient identified with name and : Yes    SUBJECTIVE:      Pt reports wanting to work on walking more to strengthen legs. Pt also requested to have therapy after 9am on Friday due to wife coming to visit at 830am.     OBJECTIVE DATA SUMMARY:    Objective:   Education: Education Given To: Patient  Education Provided: Mobility Training;Transfer Training;Safety  Education Method: Demonstration;Verbal  Barriers to Learning: Cognition  Education Outcome: Verbalized understanding;Demonstrated understanding;Continued education needed    TRANSFERS Daily Assessment    Sit to Stand Stand by assistance with pt pushing up from w/c with

## 2024-05-15 NOTE — PROGRESS NOTES
[facility]  [facility] [place] [address]     INPATIENT REHABILITATION  DAILY PROGRESS NOTE     Date: 5/15/2024    Name: Amish Shetty Age / Sex: 68 y.o. / male   CSN: [id number] MRN: [id number]   Admit Date: 5/9/2024 Length of Stay: 6 days     Primary Rehabilitation Diagnosis: Impaired Mobility and ADLs secondary to debility due to end-stage renal disease and amyloidosis and prior CVA with residual right hemiparesis     Subjective:     I personally saw and evaluated this patient face-to-face today.  Patient was seen in dialysis on 6th floor. Patient in no apparent distress. Patient denied any n/v/d, shortness of breath, or chest pain.     Patient has no significant complaints. Patient states that he is still getting used to dialysis, but is tolerating it very well. Patient is in good spirits. Per nephrology note, patient now has MWF dialysis chair at [facility] at 10:15 am.     Objective:     Vital Signs:  Patient Vitals for the past 24 hrs:   BP Temp Temp src Pulse Resp SpO2   05/15/24 0822 (!) 128/56 97.2 °F (36.2 °C) Oral 72 17 96 %   05/15/24 0601 (!) 158/59 -- -- 56 -- 100 %   05/14/24 2300 (!) 123/58 -- -- 58 -- 99 %   05/14/24 2123 (!) 126/57 -- -- 58 -- --   05/14/24 1945 (!) 124/51 97.2 °F (36.2 °C) Oral 54 17 98 %          Physical Examination:  General:  Awake, alert  Cardiovascular:  S1S2+, RRR  Pulmonary:  CTA b/l  GI:  Soft, BS+, NT, ND  Extremities:  trace edema  Right hemiparesis from prior stroke      Current Medications:  Current Facility-Administered Medications   Medication Dose Route Frequency    epoetin ole-epbx (RETACRIT) injection 6,000 Units  6,000 Units SubCUTAneous Once per day on Mon Wed Fri    hydrALAZINE (APRESOLINE) tablet 10 mg  10 mg Oral Q6H PRN    heparin (porcine) injection 3,800 Units  3,800 Units IntraCATHeter PRN    aspirin chewable tablet 81 mg  81 mg Oral Daily with breakfast    atorvastatin (LIPITOR) tablet 40 mg  40 mg Oral Nightly     Function     [x]  Fluid Electrolyte and Nutrition Balance     []  Pain Control      3. Issues that 24 hour rehabilitation nursing is following:    [x]  Fall and safety precautions     []  Wound Care     [x]  Bowel and Bladder Function     [x]  Fluid Electrolyte and Nutrition Balance     []  Pain Control      [x]  Assistance with and education on in-room safety with transfers to and from the bed, wheelchair, toilet and shower.      4. Acute rehabilitation plan of care:    [x]  Continue current care and rehab.           [x]  Physical Therapy           [x]  Occupational Therapy           []  Speech Therapy      []  Hold Rehab until further notice     5. Medications:    [x]  MAR Reviewed     [x]  Continue Present Medications       6. Chemical DVT Prophylaxis:      []  Enoxaparin     []  Unfractionated Heparin     []  Warfarin     []  NOAC     [x]  Aspirin     []  None     7. Mechanical DVT Prophylaxis:      [x]  AMBER Stockings     [x]  Sequential Compression Device     []  None     8. GI Prophylaxis:      []  PPI     []  H2 Blocker     [x]  None / Not indicated     9. Code status:Full      Dragon medical dictation software was used for portions of this report. Unintended errors may occur.    Personal Protective Equipment ( face mask ) was used while interacting with the patient      Signed:    Gertrudis Stewart, APRN - CNP      May 15, 2024

## 2024-05-15 NOTE — PLAN OF CARE
Problem: Chronic Conditions and Co-morbidities  Goal: Patient's chronic conditions and co-morbidity symptoms are monitored and maintained or improved  5/14/2024 2222 by Carmita Orourke RN  Outcome: Progressing  5/14/2024 1245 by Nadeen Lenz RN  Outcome: Progressing     Problem: Safety - Adult  Goal: Free from fall injury  5/14/2024 2222 by Carmita Orourke RN  Outcome: Progressing  5/14/2024 1245 by Nadeen Lenz RN  Outcome: Progressing     Problem: Skin/Tissue Integrity  Goal: Absence of new skin breakdown  Description: 1.  Monitor for areas of redness and/or skin breakdown  2.  Assess vascular access sites hourly  3.  Every 4-6 hours minimum:  Change oxygen saturation probe site  4.  Every 4-6 hours:  If on nasal continuous positive airway pressure, respiratory therapy assess nares and determine need for appliance change or resting period.  5/14/2024 2222 by Carmita Orourke RN  Outcome: Progressing  5/14/2024 1245 by Nadeen Lenz RN  Outcome: Progressing     Problem: Pain  Goal: Verbalizes/displays adequate comfort level or baseline comfort level  5/14/2024 2222 by Carmita Orourke RN  Outcome: Progressing  5/14/2024 1245 by Nadeen Lenz RN  Outcome: Progressing

## 2024-05-15 NOTE — PLAN OF CARE
Problem: Chronic Conditions and Co-morbidities  Goal: Patient's chronic conditions and co-morbidity symptoms are monitored and maintained or improved  5/15/2024 0945 by Susan Rosales RN  Outcome: Progressing  5/14/2024 2222 by Carmita Orourke RN  Outcome: Progressing     Problem: Safety - Adult  Goal: Free from fall injury  5/15/2024 0945 by Susan Rosales RN  Outcome: Progressing  5/14/2024 2222 by Carmita Orourke RN  Outcome: Progressing     Problem: Skin/Tissue Integrity  Goal: Absence of new skin breakdown  Description: 1.  Monitor for areas of redness and/or skin breakdown  2.  Assess vascular access sites hourly  3.  Every 4-6 hours minimum:  Change oxygen saturation probe site  4.  Every 4-6 hours:  If on nasal continuous positive airway pressure, respiratory therapy assess nares and determine need for appliance change or resting period.  5/15/2024 0945 by Susan Rosales RN  Outcome: Progressing  5/14/2024 2222 by Carmita Orourke RN  Outcome: Progressing     Problem: Pain  Goal: Verbalizes/displays adequate comfort level or baseline comfort level  5/15/2024 0945 by Susan Rosales RN  Outcome: Progressing  5/14/2024 2222 by Carmita Orourke RN  Outcome: Progressing

## 2024-05-16 PROCEDURE — 97110 THERAPEUTIC EXERCISES: CPT

## 2024-05-16 PROCEDURE — 97530 THERAPEUTIC ACTIVITIES: CPT

## 2024-05-16 PROCEDURE — 97116 GAIT TRAINING THERAPY: CPT

## 2024-05-16 PROCEDURE — 6360000002 HC RX W HCPCS: Performed by: EMERGENCY MEDICINE

## 2024-05-16 PROCEDURE — 97150 GROUP THERAPEUTIC PROCEDURES: CPT

## 2024-05-16 PROCEDURE — 6370000000 HC RX 637 (ALT 250 FOR IP): Performed by: EMERGENCY MEDICINE

## 2024-05-16 PROCEDURE — 1180000000 HC REHAB R&B

## 2024-05-16 PROCEDURE — 97535 SELF CARE MNGMENT TRAINING: CPT

## 2024-05-16 PROCEDURE — 99232 SBSQ HOSP IP/OBS MODERATE 35: CPT | Performed by: EMERGENCY MEDICINE

## 2024-05-16 RX ADMIN — HYDRALAZINE HYDROCHLORIDE 100 MG: 50 TABLET ORAL at 06:18

## 2024-05-16 RX ADMIN — HYDRALAZINE HYDROCHLORIDE 100 MG: 50 TABLET ORAL at 15:40

## 2024-05-16 RX ADMIN — HEPARIN SODIUM 5000 UNITS: 5000 INJECTION INTRAVENOUS; SUBCUTANEOUS at 22:43

## 2024-05-16 RX ADMIN — HYDRALAZINE HYDROCHLORIDE 100 MG: 50 TABLET ORAL at 22:44

## 2024-05-16 RX ADMIN — BUMETANIDE 2 MG: 1 TABLET ORAL at 17:27

## 2024-05-16 RX ADMIN — FINASTERIDE 5 MG: 5 TABLET, FILM COATED ORAL at 07:47

## 2024-05-16 RX ADMIN — SERTRALINE 50 MG: 50 TABLET, FILM COATED ORAL at 07:46

## 2024-05-16 RX ADMIN — ATORVASTATIN CALCIUM 40 MG: 40 TABLET, FILM COATED ORAL at 20:16

## 2024-05-16 RX ADMIN — ISOSORBIDE DINITRATE 40 MG: 20 TABLET ORAL at 20:16

## 2024-05-16 RX ADMIN — ISOSORBIDE DINITRATE 40 MG: 20 TABLET ORAL at 15:40

## 2024-05-16 RX ADMIN — BUMETANIDE 2 MG: 1 TABLET ORAL at 07:47

## 2024-05-16 RX ADMIN — ASPIRIN 81 MG CHEWABLE TABLET 81 MG: 81 TABLET CHEWABLE at 07:47

## 2024-05-16 RX ADMIN — Medication 1 MG: at 07:47

## 2024-05-16 RX ADMIN — LOSARTAN POTASSIUM 100 MG: 50 TABLET, FILM COATED ORAL at 07:46

## 2024-05-16 RX ADMIN — HEPARIN SODIUM 5000 UNITS: 5000 INJECTION INTRAVENOUS; SUBCUTANEOUS at 06:19

## 2024-05-16 RX ADMIN — FERROUS SULFATE TAB 325 MG (65 MG ELEMENTAL FE) 325 MG: 325 (65 FE) TAB at 07:47

## 2024-05-16 RX ADMIN — HEPARIN SODIUM 5000 UNITS: 5000 INJECTION INTRAVENOUS; SUBCUTANEOUS at 14:50

## 2024-05-16 RX ADMIN — ISOSORBIDE DINITRATE 40 MG: 20 TABLET ORAL at 07:47

## 2024-05-16 ASSESSMENT — PAIN SCALES - GENERAL
PAINLEVEL_OUTOF10: 0

## 2024-05-16 NOTE — DIALYSIS
HD Care plan  Time: 3 hrs  Dialysate: 3 K+   2.5 Ca++  Bath  Net UF: 1.5-2 L  Access: Aseptic care for RT Chest TDC  Hemodynamic stability: Maintain BP WNL     Pre Dialysis:  Patient received from Susan Rosales RN, Patient on a bed, A+O x 4, on RA  no s/s of acute distress noted. RT Chest TDC  assessed, dressing clean and intact. TDC accessed per protocol without any difficulty, line patent with good flow.     Intra Dialysis:  Time out / safety process performed per policy. Tx initiated at 1400.    TDC flowing with ease. For hemodynamic stability UF goal set at 2000 ml as tolerated.  Pt offered assistance with repositioning every 2 hours/prn    Vascular access visible and line connections remained intact throughout entire duration of treatment.   Vital signs checked every 15 mins.     Post Dialysis:  Tx completed at 1700,   Tolerated well, 2 L  removed.  De-accessed per protocol.    Dialysis catheter locked accordingly with Heparin 1.9 ml in arterial port, and 1.9 ml in venous port. Catheter dressing clean, dry and intact.  Post Dialysis report given to FRANSISCA Davis

## 2024-05-16 NOTE — PLAN OF CARE
Problem: Occupational Therapy - Adult  Goal: By Discharge: Performs self-care activities at highest level of function for planned discharge setting.  See evaluation for individualized goals.  Description: Occupational Therapy Goals   Long Term Goals  Initiated 5/10/24 and to be accomplished within 2 week(s)  1. Pt will perform self-feeding with Ginger.  2. Pt will perform grooming with Ginger.  3. Pt will perform UB bathing with supervision.  4. Pt will perform LB bathing with supervision.  5. Pt will perform tub/shower transfer with CGA.   6. Pt will perform UB dressing with setup.  7. Pt will perform LB dressing with supervision.  8. Pt will perform toileting task with CGA.  9. Pt will perform toilet transfer with CGA.      Short Term Goals   Initiated 5/10/24 and to be accomplished within 7 day(s)  1. Pt will perform self-feeding with setup.   2. Pt will perform grooming with setup.  3. Pt will perform UB bathing with Ravi.  4. Pt will perform LB bathing with CGA.  5. Pt will perform tub/shower transfer with Ravi.  6. Pt will perform UB dressing with SBA.  7. Pt will perform LB dressing with CGA.  8. Pt will perform toileting task with Ravi.  9. Pt will perform toilet transfer with Ravi.    Outcome: Progressing   Occupational Therapy TREATMENT    Patient: Amish Shetty   68 y.o.    Patient identified with name and : yes    Date: 2024    First Tx Session  Time In:  0840  Time Out:  0940    Second Tx Session  Time In: 1030  Time Out: 1108    Diagnosis: Debility [R53.81]   Precautions:  Restrictions/Precautions: Fall Risk  Required Braces or Orthoses?: No                             Chart, occupational therapy assessment, plan of care, and goals were reviewed.     Pain:  Pt reports 0/10 pain or discomfort prior to treatment.    Pt reports -/10 pain or discomfort post treatment.   Intervention Provided:     SUBJECTIVE:   Patient stated “My wife is coming tomorrow morning\".    OBJECTIVE DATA SUMMARY:

## 2024-05-16 NOTE — PLAN OF CARE
Problem: Chronic Conditions and Co-morbidities  Goal: Patient's chronic conditions and co-morbidity symptoms are monitored and maintained or improved  Outcome: Progressing     Problem: Safety - Adult  Goal: Free from fall injury  Outcome: Progressing     Problem: Skin/Tissue Integrity  Goal: Absence of new skin breakdown  Description: 1.  Monitor for areas of redness and/or skin breakdown  2.  Assess vascular access sites hourly  3.  Every 4-6 hours minimum:  Change oxygen saturation probe site  4.  Every 4-6 hours:  If on nasal continuous positive airway pressure, respiratory therapy assess nares and determine need for appliance change or resting period.  Outcome: Progressing     Problem: Pain  Goal: Verbalizes/displays adequate comfort level or baseline comfort level  Outcome: Progressing  Flowsheets (Taken 5/15/2024 2000 by Magalis Hsieh RN)  Verbalizes/displays adequate comfort level or baseline comfort level:   Encourage patient to monitor pain and request assistance   Assess pain using appropriate pain scale   Administer analgesics based on type and severity of pain and evaluate response   Implement non-pharmacological measures as appropriate and evaluate response   Consider cultural and social influences on pain and pain management   Notify Licensed Independent Practitioner if interventions unsuccessful or patient reports new pain

## 2024-05-16 NOTE — PROGRESS NOTES
TEAM CONFERENCE FOLLOW-UP  Patient: Amish Shetty (68 y.o. male)  Date: 5/16/2024  Diagnosis: Debility [R53.81] Debility      Precautions:      Met with patient to discuss the findings from 5/16/2024 Team Conference.    Patient requested further information and/or had questions:   Marilyn Stewart, MICHELLE - CNP

## 2024-05-16 NOTE — PROGRESS NOTES
[facility]  [address] [place] [id number]     INPATIENT REHABILITATION  DAILY PROGRESS NOTE     Date: 5/16/2024    Name: Amish Shetty Age / Sex: 68 y.o. / male   CSN: [id number] MRN: [id number]   Admit Date: 5/9/2024 Length of Stay: 7 days     Primary Rehabilitation Diagnosis: Impaired Mobility and ADLs secondary to debility due to end-stage renal disease and amyloidosis and prior CVA with residual right hemiparesis     Subjective:     I personally saw and evaluated this patient face-to-face today.  Patient is sitting in bed in no apparent distress, awake and alert.  Patient is in good spirits    Objective:     Vital Signs:  Patient Vitals for the past 24 hrs:   BP Temp Temp src Pulse Resp SpO2   05/16/24 1600 121/60 97 °F (36.1 °C) Oral 74 19 98 %   05/16/24 1540 (!) 145/66 -- -- -- -- --   05/16/24 0801 (!) 143/73 97.6 °F (36.4 °C) Oral 51 19 98 %   05/15/24 2000 (!) 145/71 97.9 °F (36.6 °C) Oral 76 18 95 %   05/15/24 1900 (!) 157/59 -- -- 71 -- --   05/15/24 1745 (!) 175/77 97.6 °F (36.4 °C) Oral 56 19 --   05/15/24 1715 (!) 148/69 97.8 °F (36.6 °C) -- 51 18 --   05/15/24 1700 (!) 162/74 -- -- 52 -- --        Physical Examination:  General:  Awake, alert  Cardiovascular:  S1S2+, RRR  Pulmonary:  CTA b/l  GI:  Soft, BS+, NT, ND  Extremities:  trace edema  Right hemiparesis from prior stroke      Current Medications:  Current Facility-Administered Medications   Medication Dose Route Frequency    epoetin ole-epbx (RETACRIT) injection 6,000 Units  6,000 Units SubCUTAneous Once per day on Mon Wed Fri    hydrALAZINE (APRESOLINE) tablet 10 mg  10 mg Oral Q6H PRN    heparin (porcine) injection 3,800 Units  3,800 Units IntraCATHeter PRN    aspirin chewable tablet 81 mg  81 mg Oral Daily with breakfast    atorvastatin (LIPITOR) tablet 40 mg  40 mg Oral Nightly    Virt-Caps 1 mg  1 capsule Oral Daily    bumetanide (BUMEX) tablet 2 mg  2 mg Oral BID    ferrous sulfate (IRON 325)  time of admission patient was needing moderate assistance for toileting    2. Medical Issues being followed closely:    [x]  Fall and safety precautions     []  Wound Care     [x]  Bowel and Bladder Function     [x]  Fluid Electrolyte and Nutrition Balance     []  Pain Control      3. Issues that 24 hour rehabilitation nursing is following:    [x]  Fall and safety precautions     []  Wound Care     [x]  Bowel and Bladder Function     [x]  Fluid Electrolyte and Nutrition Balance     []  Pain Control      [x]  Assistance with and education on in-room safety with transfers to and from the bed, wheelchair, toilet and shower.      4. Acute rehabilitation plan of care:    [x]  Continue current care and rehab.           [x]  Physical Therapy           [x]  Occupational Therapy           []  Speech Therapy      []  Hold Rehab until further notice     5. Medications:    [x]  MAR Reviewed     [x]  Continue Present Medications       6. Chemical DVT Prophylaxis:      []  Enoxaparin     []  Unfractionated Heparin     []  Warfarin     []  NOAC     [x]  Aspirin     []  None     7. Mechanical DVT Prophylaxis:      [x]  AMBER Stockings     [x]  Sequential Compression Device     []  None     8. GI Prophylaxis:      []  PPI     []  H2 Blocker     [x]  None / Not indicated     9. Code status:Full      Dragon medical dictation software was used for portions of this report. Unintended errors may occur.    Personal Protective Equipment ( face mask ) was used while interacting with the patient      Signed:    Clinton Zamora MD      May 16, 2024

## 2024-05-16 NOTE — PLAN OF CARE
refer to the flowsheet for vital signs taken during this treatment.    After treatment:   [] Patient left in no apparent distress in bed  [x] Patient left in no apparent distress sitting up in chair  [] Patient left in no apparent distress in w/c mobilizing under own power  [] Patient left in no apparent distress dining area  [] Patient left in no apparent distress mobilizing under own power  [x] Call bell left within reach  [] Nursing notified  [] Caregiver present  [] Bed alarm activated   [x] Chair alarm activated        Lisette Smith, FRANCISCO  5/16/2024

## 2024-05-17 PROCEDURE — 97535 SELF CARE MNGMENT TRAINING: CPT

## 2024-05-17 PROCEDURE — 6360000002 HC RX W HCPCS: Performed by: EMERGENCY MEDICINE

## 2024-05-17 PROCEDURE — 94761 N-INVAS EAR/PLS OXIMETRY MLT: CPT

## 2024-05-17 PROCEDURE — 97110 THERAPEUTIC EXERCISES: CPT

## 2024-05-17 PROCEDURE — 1180000000 HC REHAB R&B

## 2024-05-17 PROCEDURE — 6370000000 HC RX 637 (ALT 250 FOR IP): Performed by: EMERGENCY MEDICINE

## 2024-05-17 PROCEDURE — 97112 NEUROMUSCULAR REEDUCATION: CPT

## 2024-05-17 PROCEDURE — 97116 GAIT TRAINING THERAPY: CPT

## 2024-05-17 PROCEDURE — 99232 SBSQ HOSP IP/OBS MODERATE 35: CPT | Performed by: EMERGENCY MEDICINE

## 2024-05-17 PROCEDURE — 6360000002 HC RX W HCPCS: Performed by: INTERNAL MEDICINE

## 2024-05-17 PROCEDURE — 97530 THERAPEUTIC ACTIVITIES: CPT

## 2024-05-17 PROCEDURE — 90935 HEMODIALYSIS ONE EVALUATION: CPT

## 2024-05-17 RX ADMIN — SERTRALINE 50 MG: 50 TABLET, FILM COATED ORAL at 08:35

## 2024-05-17 RX ADMIN — ASPIRIN 81 MG CHEWABLE TABLET 81 MG: 81 TABLET CHEWABLE at 08:35

## 2024-05-17 RX ADMIN — HEPARIN SODIUM 5000 UNITS: 5000 INJECTION INTRAVENOUS; SUBCUTANEOUS at 13:40

## 2024-05-17 RX ADMIN — ATORVASTATIN CALCIUM 40 MG: 40 TABLET, FILM COATED ORAL at 21:59

## 2024-05-17 RX ADMIN — ISOSORBIDE DINITRATE 40 MG: 20 TABLET ORAL at 08:40

## 2024-05-17 RX ADMIN — Medication 1 MG: at 08:35

## 2024-05-17 RX ADMIN — BUMETANIDE 2 MG: 1 TABLET ORAL at 17:48

## 2024-05-17 RX ADMIN — HYDRALAZINE HYDROCHLORIDE 100 MG: 50 TABLET ORAL at 17:48

## 2024-05-17 RX ADMIN — LOSARTAN POTASSIUM 100 MG: 50 TABLET, FILM COATED ORAL at 08:40

## 2024-05-17 RX ADMIN — FINASTERIDE 5 MG: 5 TABLET, FILM COATED ORAL at 08:35

## 2024-05-17 RX ADMIN — HEPARIN SODIUM 5000 UNITS: 5000 INJECTION INTRAVENOUS; SUBCUTANEOUS at 22:08

## 2024-05-17 RX ADMIN — BUMETANIDE 2 MG: 1 TABLET ORAL at 08:35

## 2024-05-17 RX ADMIN — EPOETIN ALFA-EPBX 6000 UNITS: 3000 INJECTION, SOLUTION INTRAVENOUS; SUBCUTANEOUS at 22:08

## 2024-05-17 RX ADMIN — HYDRALAZINE HYDROCHLORIDE 100 MG: 50 TABLET ORAL at 05:58

## 2024-05-17 RX ADMIN — FERROUS SULFATE TAB 325 MG (65 MG ELEMENTAL FE) 325 MG: 325 (65 FE) TAB at 08:35

## 2024-05-17 RX ADMIN — HEPARIN SODIUM 5000 UNITS: 5000 INJECTION INTRAVENOUS; SUBCUTANEOUS at 05:58

## 2024-05-17 RX ADMIN — ISOSORBIDE DINITRATE 40 MG: 20 TABLET ORAL at 21:58

## 2024-05-17 RX ADMIN — ISOSORBIDE DINITRATE 40 MG: 20 TABLET ORAL at 17:49

## 2024-05-17 RX ADMIN — HYDRALAZINE HYDROCHLORIDE 100 MG: 50 TABLET ORAL at 21:59

## 2024-05-17 ASSESSMENT — PAIN SCALES - GENERAL
PAINLEVEL_OUTOF10: 0

## 2024-05-17 NOTE — PLAN OF CARE
Problem: Occupational Therapy - Adult  Goal: By Discharge: Performs self-care activities at highest level of function for planned discharge setting.  See evaluation for individualized goals.  Description: Occupational Therapy Goals   Long Term Goals  Initiated 5/10/24 and to be accomplished within 2 week(s)  1. Pt will perform self-feeding with Ginger.  2. Pt will perform grooming with Ginger.   3. Pt will perform UB bathing with supervision. ( 24)  4. Pt will perform LB bathing with supervision. ( 24)  5. Pt will perform tub/shower transfer with CGA. ( 24)  6. Pt will perform UB dressing with setup. ( 24)  7. Pt will perform LB dressing with supervision.   8. Pt will perform toileting task with CGA. ( 24)  9. Pt will perform toilet transfer with CGA. ( 24)      Short Term Goals   Initiated 5/10/24 and to be accomplished within 7 day(s)  1. Pt will perform self-feeding with setup. ( 24)  2. Pt will perform grooming with setup. ( 24)  3. Pt will perform UB bathing with Ravi. ( 24)  4. Pt will perform LB bathing with CGA. ( 24)  5. Pt will perform tub/shower transfer with Ravi.  ( 24)  6. Pt will perform UB dressing with SBA. ( 24)  7. Pt will perform LB dressing with CGA. ( 24)  8. Pt will perform toileting task with Ravi. ( 24)  9. Pt will perform toilet transfer with Ravi. ( 24)    Outcome: Progressing   OT WEEKLY PROGRESS NOTE  Patient Name:Amish Shetty    First Treatment Session  Time In: 0630  Time Out: 0800    Medical Diagnosis:  Debility [R53.81] Debility     Precautions:  Restrictions/Precautions: Fall Risk  Required Braces or Orthoses?: No                             Pain at start of tx:0/10 pain or discomfort.  Pain at stop of tx:-/10 pain or discomfort.    Patient identified with name and :yes  Subjective: \"My wife won't be able to be here Monday\"         Objective:     Outcome Measures:      GROSS  ASSESSMENT Initial Assessment Weekly Progress Assessment   AROM Generally decreased, functional Generally decreased, functional   PROM Generally decreased, functional Generally decreased, functional   Coordination Generally decreased, functional  Gross motor impairments, Fine motor impairments       Generally decreased, functional            Tone Abnormal Abnormal   Sensation Impaired Impaired  Impaired     COGNITION/PERCEPTION Initial Assessment Weekly Progress Assessment 5/17/2024   Overall orientation status Within Functional Limits Within Functional Limits   Orientation level Oriented X4 Oriented X4   Overall cognitive status Exceptions  Exceptions      Alertness Appears intact Appears intact   Following Commands Appears intact  Appears intact   Attention Span Appears intact         Memory Decreased recall of recent events, Impaired         Safety Judgment Impaired     Problem Solving Impaired  Assistance required to implement solutions   Initiation Requires cues for some Requires cues for some     Sequencing Requires cues for some Requires cues for some     EATING Initial Assessment Weekly Progress Assessment 5/17/2024   Functional Level Stand by assistance  Setup   Clinical factors Pt performed self feeding drinking from cup with straw and reports using standard utensils to self feed. Pt requires setup of tough meat to be cut due to RUE weakness.  5/17/24 - Pt performed self feeding with setup of tray opening condiments for meal.      GROOMING Initial Assessment Weekly Progress Assessment 5/17/2024   Functional Level Stand by assistance Grooming: Supervision   Clinical factors  Pt performed grooming tasks in seated position at sink side performing oral care and washing face. Grooming Skilled Clinical Factors: Pt performed grooming tasks washing pt face and hands from seated position with supervision. Pt declined oral care until after breakfast.     UPPER BODY BATHING Initial Assessment Weekly Progress

## 2024-05-17 NOTE — PLAN OF CARE
steps with step through pattern to ascend and step to pattern with left LE lead(as pt insists on this sequence) to descend with CGA for safety and balance. Pt with increased left lean with wt bearing on left HR to ascend and descend to decrease wt bearing on right LE. Pt negotiated up and down 6 steps with step through pattern to ascend and step to pattern with left LE lead(as pt insists on this sequence) to descend with CGA for safety and balance. Pt with increased left lean with wt bearing on left HR to ascend and descend to decrease wt bearing on right LE.   Curbs/Ramps 6\"  NT today,   5/14/2024-Pt negotiated up and down 6\" platform step with SBQC on left with CGA, ascending with right LE lead and descending with left LE lead, as pt insists this is how he was taught        Neuro Re-Education:   Pt performed left LE tap ups on 6\" step 2x10 with SBQC on left with v/c for upright posture and posterior right knee blocked to limit recurvatum and facilitation to stabilize right hip abduction for right hip stabilization in stance.       ASSESSMENT:  Pt has made fair progress and met 1/5 goals. Pt continues to require SBA/CGA for all gait and txfrs with SBQC due to pt's decreased safety awareness, decreased balance and poor carryover of improved movement patterns, as pt has long term learned compensatory movement patterns. Pt moves too quickly with gait and txfrs increasing risk of LOB/fall.   Progression toward goals:  []      Improving appropriately and progressing toward goals  [x]      Improving slowly and progressing toward goals  []      Not making progress toward goals and plan of care will be adjusted     PLAN:  Patient continues to benefit from skilled intervention to address the above impairments.  Continue treatment per established plan of care.  Discharge Recommendations:  24 hour supervision or assist;Home with Home health PT;Outpatient PT  Further Equipment Recommendations for Discharge:                      Estimated Discharge Date: 5/22/2024    Activity Tolerance:   good  Please refer to the flowsheet for vital signs taken during this treatment.  After treatment:   [] Patient left in no apparent distress in bed  [x] Patient left in no apparent distress sitting up in chair  [] Patient left in no apparent distress in w/c mobilizing under own power  [] Patient left in no apparent distress dining area  [] Patient left in no apparent distress mobilizing under own power  [x] Call bell left within reach  [] Nursing notified  [] Caregiver present  [] Bed alarm activated   [x] Chair alarm activated        Lisette Smith, FRANCISCO  5/17/2024

## 2024-05-17 NOTE — PROGRESS NOTES
HD Care plan  Time: 3 hrs  Dialysate: 2K+  2.5Ca++  Bath  Net UF: 2L  Access: Aseptic care for Right TDC  Hemodynamic stability: Maintain BP WNL     Pre Dialysis:  Patient received from ERIN Franks RN . Patient arrived on a bed, A+O x 3, on RA, no s/s of acute distress noted. Right TDC assessed, no abnormalities noted. TDC accessed per protocol without any difficulty, line patent with good flow.     Intra Dialysis:  Time out / safety process performed per policy. Tx initiated at 1405.    TDC flowing with ease. For hemodynamic stability UF goal set at 2000 ml as tolerated.  Pt offered assistance with repositioning every 2 hours/prn    Vascular access visible and line connections remained intact throughout entire duration of treatment.   Vital signs checked every 15 mins.     Post Dialysis:  Tx completed at 1705,   Tolerated well, 2 L  removed. De-accessed per protocol.    Dialysis catheter locked accordingly with Heparin 1.9 ml in arterial port, and 1.9 ml in venous port. Catheter dressing clean, dry and intact.  Post Dialysis report given to ERIN Franks RN

## 2024-05-17 NOTE — PLAN OF CARE
Problem: Pain  Goal: Verbalizes/displays adequate comfort level or baseline comfort level  5/16/2024 2043 by Carmita Orourke RN  Outcome: Progressing  5/16/2024 0955 by Susan Rosales RN  Outcome: Progressing  Flowsheets (Taken 5/15/2024 2000 by Magalis Hsieh RN)  Verbalizes/displays adequate comfort level or baseline comfort level:   Encourage patient to monitor pain and request assistance   Assess pain using appropriate pain scale   Administer analgesics based on type and severity of pain and evaluate response   Implement non-pharmacological measures as appropriate and evaluate response   Consider cultural and social influences on pain and pain management   Notify Licensed Independent Practitioner if interventions unsuccessful or patient reports new pain     Problem: Skin/Tissue Integrity  Goal: Absence of new skin breakdown  Description: 1.  Monitor for areas of redness and/or skin breakdown  2.  Assess vascular access sites hourly  3.  Every 4-6 hours minimum:  Change oxygen saturation probe site  4.  Every 4-6 hours:  If on nasal continuous positive airway pressure, respiratory therapy assess nares and determine need for appliance change or resting period.  5/16/2024 2043 by Carmita Orourke RN  Outcome: Progressing  5/16/2024 0955 by Susan Rosales RN  Outcome: Progressing     Problem: Safety - Adult  Goal: Free from fall injury  5/16/2024 2043 by Carmita Orourke RN  Outcome: Progressing  5/16/2024 0955 by Susan Rosales RN  Outcome: Progressing     Problem: Chronic Conditions and Co-morbidities  Goal: Patient's chronic conditions and co-morbidity symptoms are monitored and maintained or improved  5/16/2024 2043 by Carmita Orourke RN  Outcome: Progressing  5/16/2024 0955 by Susan Rosales RN  Outcome: Progressing

## 2024-05-17 NOTE — PROGRESS NOTES
[facility]  [address] [place] [id number]     INPATIENT REHABILITATION  DAILY PROGRESS NOTE     Date: 5/17/2024    Name: Amish Shetty Age / Sex: 68 y.o. / male   CSN: [id number] MRN: [id number]   Admit Date: 5/9/2024 Length of Stay: 8 days     Primary Rehabilitation Diagnosis: Impaired Mobility and ADLs secondary to debility due to end-stage renal disease and amyloidosis and prior CVA with residual right hemiparesis     Subjective:     I personally saw and evaluated this patient face-to-face today.  I saw patient during hemodialysis.  Patient is laying in bed in no apparent distress.  Patient is awake and alert.  In good spirits.  Denies any pain    Objective:     Vital Signs:  Patient Vitals for the past 24 hrs:   BP Temp Temp src Pulse Resp SpO2   05/17/24 1545 (!) 177/83 -- -- 53 -- --   05/17/24 1530 (!) 182/81 -- -- 51 -- --   05/17/24 1515 (!) 193/64 -- -- (!) 49 -- --   05/17/24 1500 (!) 164/79 -- -- 54 -- --   05/17/24 1445 (!) 159/71 -- -- 55 -- --   05/17/24 1430 138/67 -- -- 53 -- --   05/17/24 1415 (!) 155/65 -- -- 51 -- --   05/17/24 1405 (!) 129/56 -- -- 55 -- --   05/17/24 1355 126/67 97.3 °F (36.3 °C) -- 56 -- --   05/17/24 0743 (!) 168/64 97.1 °F (36.2 °C) Oral 53 18 96 %   05/17/24 0600 (!) 174/65 -- -- 60 -- 100 %   05/16/24 2230 139/64 -- -- -- -- --   05/16/24 2000 (!) 146/58 97 °F (36.1 °C) Axillary 73 18 96 %   05/16/24 1600 121/60 97 °F (36.1 °C) Oral 74 19 98 %        Physical Examination:  General:  Awake, alert  Cardiovascular:  S1S2+, RRR  Pulmonary:  CTA b/l  GI:  Soft, BS+, NT, ND  Extremities:  No edema  Right hemiparesis from prior stroke      Current Medications:  Current Facility-Administered Medications   Medication Dose Route Frequency    epoetin ole-epbx (RETACRIT) injection 6,000 Units  6,000 Units SubCUTAneous Once per day on Mon Wed Fri    hydrALAZINE (APRESOLINE) tablet 10 mg  10 mg Oral Q6H PRN    heparin (porcine) injection 3,800  while interacting with the patient      Signed:    Clinton Zamora MD      May 17, 2024

## 2024-05-17 NOTE — PROGRESS NOTES
RENAL DAILY PROGRESS NOTE            68-year-old male with past medical history of renal myelomatosis stroke hypertensions heart failure history of GI bleed admitted for short of breath following for renal failure  Subjective:       Complaint:   His breathing is at baseline.  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  Hypertension  Hypokalemia, improving   Proteinuria  Hx diabetes  Hx CVA  Anemia,followed by hematology as OP, on epogen  Hx iron deficiency   PLAN:   Plan for dialysis today   with 3K bath, UF goal 1.5 to 2 L as tolerated.  Continue diuretics.    He has now new place for dialysis at [facility] at 10.15am.   Adjust medication per ESRD status.              Current Facility-Administered Medications   Medication Dose Route Frequency    epoetin ole-epbx (RETACRIT) injection 6,000 Units  6,000 Units SubCUTAneous Once per day on Mon Wed Fri    hydrALAZINE (APRESOLINE) tablet 10 mg  10 mg Oral Q6H PRN    heparin (porcine) injection 3,800 Units  3,800 Units IntraCATHeter PRN    aspirin chewable tablet 81 mg  81 mg Oral Daily with breakfast    atorvastatin (LIPITOR) tablet 40 mg  40 mg Oral Nightly    Virt-Caps 1 mg  1 capsule Oral Daily    bumetanide (BUMEX) tablet 2 mg  2 mg Oral BID    ferrous sulfate (IRON 325) tablet 325 mg  325 mg Oral Daily with breakfast    finasteride (PROSCAR) tablet 5 mg  5 mg Oral Daily    fluticasone (FLONASE) 50 MCG/ACT nasal spray 2 spray  2 spray Each Nostril Daily    hydrALAZINE (APRESOLINE) tablet 100 mg  100 mg Oral 3 times per day    isosorbide dinitrate (ISORDIL) tablet 40 mg  40 mg Oral

## 2024-05-17 NOTE — PLAN OF CARE
INTERVENTION:  HEMODYNAMIC STABILIZATION  MAINTAIN BP WNL WHILE ON HD.     INTERVENTION:  FLUID MANAGEMENT  WILL ATTEMPT [id number] ML TOTAL FLUID REMOVAL AS TOLERATED.     INTERVENTION:  METABOLIC/ELECTROLYTE MANAGEMENT  3.0 POTASSIUM 2.5 CALCIUM DIALYSATE USED WITH HD TODAY.     INTERVENTION:  HEMODIALYSIS ACCESS SITE MANAGEMENT  RIGHT TDC ACCESSED USING ASEPTIC TECHNIQUE.     GOAL:  SIGNS AND SYMPTOMS OF LISTED POTENTIAL PROBLEMS WILL BE ABSENT OR MANAGEABLE.     OUTCOME:  PROGRESSING.     HD PLANNED FOR 3 HOURS TODAY.      Problem: Chronic Conditions and Co-morbidities  Goal: Patient's chronic conditions and co-morbidity symptoms are monitored and maintained or improved  Outcome: Progressing

## 2024-05-18 PROCEDURE — 97110 THERAPEUTIC EXERCISES: CPT

## 2024-05-18 PROCEDURE — 94761 N-INVAS EAR/PLS OXIMETRY MLT: CPT

## 2024-05-18 PROCEDURE — 6370000000 HC RX 637 (ALT 250 FOR IP): Performed by: EMERGENCY MEDICINE

## 2024-05-18 PROCEDURE — 6360000002 HC RX W HCPCS: Performed by: EMERGENCY MEDICINE

## 2024-05-18 PROCEDURE — 1180000000 HC REHAB R&B

## 2024-05-18 RX ADMIN — LOSARTAN POTASSIUM 100 MG: 50 TABLET, FILM COATED ORAL at 08:16

## 2024-05-18 RX ADMIN — HYDRALAZINE HYDROCHLORIDE 100 MG: 50 TABLET ORAL at 14:21

## 2024-05-18 RX ADMIN — HYDRALAZINE HYDROCHLORIDE 100 MG: 50 TABLET ORAL at 20:55

## 2024-05-18 RX ADMIN — SERTRALINE 50 MG: 50 TABLET, FILM COATED ORAL at 08:16

## 2024-05-18 RX ADMIN — HEPARIN SODIUM 5000 UNITS: 5000 INJECTION INTRAVENOUS; SUBCUTANEOUS at 20:56

## 2024-05-18 RX ADMIN — ISOSORBIDE DINITRATE 40 MG: 20 TABLET ORAL at 14:21

## 2024-05-18 RX ADMIN — HEPARIN SODIUM 5000 UNITS: 5000 INJECTION INTRAVENOUS; SUBCUTANEOUS at 06:29

## 2024-05-18 RX ADMIN — ASPIRIN 81 MG CHEWABLE TABLET 81 MG: 81 TABLET CHEWABLE at 08:16

## 2024-05-18 RX ADMIN — FINASTERIDE 5 MG: 5 TABLET, FILM COATED ORAL at 08:16

## 2024-05-18 RX ADMIN — BUMETANIDE 2 MG: 1 TABLET ORAL at 17:45

## 2024-05-18 RX ADMIN — ATORVASTATIN CALCIUM 40 MG: 40 TABLET, FILM COATED ORAL at 20:56

## 2024-05-18 RX ADMIN — FERROUS SULFATE TAB 325 MG (65 MG ELEMENTAL FE) 325 MG: 325 (65 FE) TAB at 08:16

## 2024-05-18 RX ADMIN — BUMETANIDE 2 MG: 1 TABLET ORAL at 08:16

## 2024-05-18 RX ADMIN — ISOSORBIDE DINITRATE 40 MG: 20 TABLET ORAL at 08:16

## 2024-05-18 RX ADMIN — ISOSORBIDE DINITRATE 40 MG: 20 TABLET ORAL at 20:54

## 2024-05-18 RX ADMIN — Medication 1 MG: at 08:16

## 2024-05-18 RX ADMIN — HYDRALAZINE HYDROCHLORIDE 100 MG: 50 TABLET ORAL at 06:29

## 2024-05-18 RX ADMIN — HEPARIN SODIUM 5000 UNITS: 5000 INJECTION INTRAVENOUS; SUBCUTANEOUS at 14:21

## 2024-05-18 ASSESSMENT — PAIN SCALES - GENERAL
PAINLEVEL_OUTOF10: 0

## 2024-05-18 NOTE — PLAN OF CARE
Problem: Occupational Therapy - Adult  Goal: By Discharge: Performs self-care activities at highest level of function for planned discharge setting.  See evaluation for individualized goals.  Description: Occupational Therapy Goals   Long Term Goals  Initiated 5/10/24 and to be accomplished within 2 week(s)  1. Pt will perform self-feeding with Ginger.  2. Pt will perform grooming with Ginger.  3. Pt will perform UB bathing with supervision.  4. Pt will perform LB bathing with supervision.  5. Pt will perform tub/shower transfer with CGA.   6. Pt will perform UB dressing with setup.  7. Pt will perform LB dressing with supervision.  8. Pt will perform toileting task with CGA.  9. Pt will perform toilet transfer with CGA.      Short Term Goals   Initiated 5/10/24 and to be accomplished within 7 day(s)  1. Pt will perform self-feeding with setup.   2. Pt will perform grooming with setup.  3. Pt will perform UB bathing with Ravi.  4. Pt will perform LB bathing with CGA.  5. Pt will perform tub/shower transfer with Ravi.  6. Pt will perform UB dressing with SBA.  7. Pt will perform LB dressing with CGA.  8. Pt will perform toileting task with Ravi.  9. Pt will perform toilet transfer with Ravi.    Outcome: Progressing   Occupational Therapy TREATMENT    Patient: Amish Shetty   68 y.o.    Patient identified with name and : yes    Date: 2024    First Tx Session  Time In: 1120  Time Out: 1220    Diagnosis: Debility [R53.81]   Precautions: fall risk/ Restrictions/Precautions: Fall Risk  Required Braces or Orthoses?: No                             Chart, occupational therapy assessment, plan of care, and goals were reviewed.     Pain:  Pt reports 0/10 pain or discomfort prior to treatment.    Pt reports 0/10 pain or discomfort post treatment.   Intervention Provided: na      SUBJECTIVE:   Patient stated “I will go to the gym w/you.\"    OBJECTIVE DATA SUMMARY:       THERAPEUTIC EXERCISE Daily Assessment   Tx in gym

## 2024-05-18 NOTE — PLAN OF CARE
Problem: Chronic Conditions and Co-morbidities  Goal: Patient's chronic conditions and co-morbidity symptoms are monitored and maintained or improved  5/18/2024 1019 by Glenys Weaver RN  Outcome: Progressing  Flowsheets  Taken 5/18/2024 1019  Care Plan - Patient's Chronic Conditions and Co-Morbidity Symptoms are Monitored and Maintained or Improved:   Monitor and assess patient's chronic conditions and comorbid symptoms for stability, deterioration, or improvement   Collaborate with multidisciplinary team to address chronic and comorbid conditions and prevent exacerbation or deterioration   Update acute care plan with appropriate goals if chronic or comorbid symptoms are exacerbated and prevent overall improvement and discharge       Problem: Safety - Adult  Goal: Free from fall injury  5/18/2024 1019 by Glenys Weaver RN  Outcome: Progressing  Free From Fall Injury: Instruct family/caregiver on patient safety       Problem: Skin/Tissue Integrity  Goal: Absence of new skin breakdown  Description: 1.  Monitor for areas of redness and/or skin breakdown  2.  Assess vascular access sites hourly  5/18/2024 1019 by Glenys Weaver RN  Outcome: Progressing       Problem: Pain  Goal: Verbalizes/displays adequate comfort level or baseline comfort level  5/18/2024 1019 by Glenys Weaver RN  Outcome: Progressing  Verbalizes/displays adequate comfort level or baseline comfort level:   Encourage patient to monitor pain and request assistance   Assess pain using appropriate pain scale   Administer analgesics based on type and severity of pain and evaluate response   Implement non-pharmacological measures as appropriate and evaluate response   Consider cultural and social influences on pain and pain management   Notify Licensed Independent Practitioner if interventions unsuccessful or patient reports new pain

## 2024-05-19 VITALS
OXYGEN SATURATION: 98 % | TEMPERATURE: 98.7 F | HEIGHT: 69 IN | BODY MASS INDEX: 27.25 KG/M2 | HEART RATE: 64 BPM | RESPIRATION RATE: 18 BRPM | DIASTOLIC BLOOD PRESSURE: 52 MMHG | SYSTOLIC BLOOD PRESSURE: 136 MMHG | WEIGHT: 184 LBS

## 2024-05-19 LAB
ANION GAP SERPL CALC-SCNC: 4 MMOL/L (ref 3–18)
BUN SERPL-MCNC: 53 MG/DL (ref 7–18)
BUN/CREAT SERPL: 17 (ref 12–20)
CALCIUM SERPL-MCNC: 8.9 MG/DL (ref 8.5–10.1)
CHLORIDE SERPL-SCNC: 102 MMOL/L (ref 100–111)
CO2 SERPL-SCNC: 28 MMOL/L (ref 21–32)
CREAT SERPL-MCNC: 3.13 MG/DL (ref 0.6–1.3)
GLUCOSE SERPL-MCNC: 86 MG/DL (ref 74–99)
HGB BLD-MCNC: 8.8 G/DL (ref 13–16)
POTASSIUM SERPL-SCNC: 4.4 MMOL/L (ref 3.5–5.5)
SODIUM SERPL-SCNC: 134 MMOL/L (ref 136–145)

## 2024-05-19 PROCEDURE — 1180000000 HC REHAB R&B

## 2024-05-19 PROCEDURE — 80048 BASIC METABOLIC PNL TOTAL CA: CPT

## 2024-05-19 PROCEDURE — 85018 HEMOGLOBIN: CPT

## 2024-05-19 PROCEDURE — 6360000002 HC RX W HCPCS: Performed by: EMERGENCY MEDICINE

## 2024-05-19 PROCEDURE — 36415 COLL VENOUS BLD VENIPUNCTURE: CPT

## 2024-05-19 PROCEDURE — 6370000000 HC RX 637 (ALT 250 FOR IP): Performed by: EMERGENCY MEDICINE

## 2024-05-19 PROCEDURE — 94761 N-INVAS EAR/PLS OXIMETRY MLT: CPT

## 2024-05-19 RX ADMIN — FERROUS SULFATE TAB 325 MG (65 MG ELEMENTAL FE) 325 MG: 325 (65 FE) TAB at 08:21

## 2024-05-19 RX ADMIN — HEPARIN SODIUM 5000 UNITS: 5000 INJECTION INTRAVENOUS; SUBCUTANEOUS at 21:30

## 2024-05-19 RX ADMIN — ISOSORBIDE DINITRATE 40 MG: 20 TABLET ORAL at 14:10

## 2024-05-19 RX ADMIN — HYDRALAZINE HYDROCHLORIDE 100 MG: 50 TABLET ORAL at 06:25

## 2024-05-19 RX ADMIN — HEPARIN SODIUM 5000 UNITS: 5000 INJECTION INTRAVENOUS; SUBCUTANEOUS at 06:25

## 2024-05-19 RX ADMIN — BUMETANIDE 2 MG: 1 TABLET ORAL at 08:22

## 2024-05-19 RX ADMIN — HYDRALAZINE HYDROCHLORIDE 100 MG: 50 TABLET ORAL at 20:38

## 2024-05-19 RX ADMIN — ISOSORBIDE DINITRATE 40 MG: 20 TABLET ORAL at 08:22

## 2024-05-19 RX ADMIN — HEPARIN SODIUM 5000 UNITS: 5000 INJECTION INTRAVENOUS; SUBCUTANEOUS at 14:10

## 2024-05-19 RX ADMIN — FINASTERIDE 5 MG: 5 TABLET, FILM COATED ORAL at 08:22

## 2024-05-19 RX ADMIN — SERTRALINE 50 MG: 50 TABLET, FILM COATED ORAL at 08:22

## 2024-05-19 RX ADMIN — ASPIRIN 81 MG CHEWABLE TABLET 81 MG: 81 TABLET CHEWABLE at 08:22

## 2024-05-19 RX ADMIN — Medication 1 MG: at 08:22

## 2024-05-19 RX ADMIN — ATORVASTATIN CALCIUM 40 MG: 40 TABLET, FILM COATED ORAL at 20:38

## 2024-05-19 RX ADMIN — LOSARTAN POTASSIUM 100 MG: 50 TABLET, FILM COATED ORAL at 08:21

## 2024-05-19 RX ADMIN — ISOSORBIDE DINITRATE 40 MG: 20 TABLET ORAL at 20:38

## 2024-05-19 RX ADMIN — HYDRALAZINE HYDROCHLORIDE 100 MG: 50 TABLET ORAL at 14:10

## 2024-05-19 RX ADMIN — BUMETANIDE 2 MG: 1 TABLET ORAL at 17:07

## 2024-05-19 ASSESSMENT — PAIN SCALES - GENERAL
PAINLEVEL_OUTOF10: 0

## 2024-05-19 NOTE — PROGRESS NOTES
RENAL DAILY PROGRESS NOTE            68-year-old male with past medical history of renal myelomatosis stroke hypertensions heart failure history of GI bleed admitted for short of breath following for renal failure  Subjective:       Complaint:   His breathing is at baseline.  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  Hypertension  Hypokalemia, improving   Proteinuria  Hx diabetes  Hx CVA  Anemia,followed by hematology as OP, on epogen  Hx iron deficiency   PLAN:   Dialysis again on monday  He has now new place for dialysis at [facility] at 10.15am.   Adjust medication per ESRD status.              Current Facility-Administered Medications   Medication Dose Route Frequency    epoetin ole-epbx (RETACRIT) injection 6,000 Units  6,000 Units SubCUTAneous Once per day on Mon Wed Fri    hydrALAZINE (APRESOLINE) tablet 10 mg  10 mg Oral Q6H PRN    heparin (porcine) injection 3,800 Units  3,800 Units IntraCATHeter PRN    aspirin chewable tablet 81 mg  81 mg Oral Daily with breakfast    atorvastatin (LIPITOR) tablet 40 mg  40 mg Oral Nightly    Virt-Caps 1 mg  1 capsule Oral Daily    bumetanide (BUMEX) tablet 2 mg  2 mg Oral BID    ferrous sulfate (IRON 325) tablet 325 mg  325 mg Oral Daily with breakfast    finasteride (PROSCAR) tablet 5 mg  5 mg Oral Daily    fluticasone (FLONASE) 50 MCG/ACT nasal spray 2 spray  2 spray Each Nostril Daily    hydrALAZINE (APRESOLINE) tablet 100 mg  100 mg Oral 3 times per day    isosorbide dinitrate (ISORDIL) tablet 40 mg  40 mg Oral TID    losartan (COZAAR) tablet 100 mg  100 mg Oral Daily    sertraline (ZOLOFT) tablet

## 2024-05-19 NOTE — PLAN OF CARE
Problem: Chronic Conditions and Co-morbidities  Goal: Patient's chronic conditions and co-morbidity symptoms are monitored and maintained or improved  5/19/2024 0920 by Martina Arteaga RN  Outcome: Progressing  Flowsheets  Taken 5/19/2024 0909 by Martina Arteaga RN  Care Plan - Patient's Chronic Conditions and Co-Morbidity Symptoms are Monitored and Maintained or Improved: Monitor and assess patient's chronic conditions and comorbid symptoms for stability, deterioration, or improvement  Taken 5/18/2024 1945 by Magalis Hsieh RN  Care Plan - Patient's Chronic Conditions and Co-Morbidity Symptoms are Monitored and Maintained or Improved: Monitor and assess patient's chronic conditions and comorbid symptoms for stability, deterioration, or improvement  5/18/2024 1928 by Magalis Hsieh RN  Outcome: Progressing     Problem: Safety - Adult  Goal: Free from fall injury  5/19/2024 0920 by Martina Arteaga RN  Outcome: Progressing  5/18/2024 1928 by Magalis Hsieh RN  Outcome: Progressing     Problem: Skin/Tissue Integrity  Goal: Absence of new skin breakdown  Description: 1.  Monitor for areas of redness and/or skin breakdown  2.  Assess vascular access sites hourly  3.  Every 4-6 hours minimum:  Change oxygen saturation probe site  4.  Every 4-6 hours:  If on nasal continuous positive airway pressure, respiratory therapy assess nares and determine need for appliance change or resting period.  5/19/2024 0920 by Martina Arteaga RN  Outcome: Progressing  5/18/2024 1928 by Magalis Hsieh RN  Outcome: Progressing     Problem: Pain  Goal: Verbalizes/displays adequate comfort level or baseline comfort level  5/19/2024 0920 by Martina Arteaga RN  Outcome: Progressing  Flowsheets (Taken 5/18/2024 1945 by Magalis Hsieh RN)  Verbalizes/displays adequate comfort level or baseline comfort level:   Encourage patient to monitor pain and request assistance   Assess pain using appropriate pain scale   Administer analgesics

## 2024-05-20 PROCEDURE — 99232 SBSQ HOSP IP/OBS MODERATE 35: CPT | Performed by: EMERGENCY MEDICINE

## 2024-05-20 PROCEDURE — 97535 SELF CARE MNGMENT TRAINING: CPT

## 2024-05-20 PROCEDURE — 97116 GAIT TRAINING THERAPY: CPT

## 2024-05-20 PROCEDURE — 97530 THERAPEUTIC ACTIVITIES: CPT

## 2024-05-20 PROCEDURE — 97110 THERAPEUTIC EXERCISES: CPT

## 2024-05-20 PROCEDURE — 94761 N-INVAS EAR/PLS OXIMETRY MLT: CPT

## 2024-05-20 PROCEDURE — 6360000002 HC RX W HCPCS: Performed by: EMERGENCY MEDICINE

## 2024-05-20 PROCEDURE — 1180000000 HC REHAB R&B

## 2024-05-20 PROCEDURE — 6360000002 HC RX W HCPCS: Performed by: INTERNAL MEDICINE

## 2024-05-20 PROCEDURE — 6370000000 HC RX 637 (ALT 250 FOR IP): Performed by: EMERGENCY MEDICINE

## 2024-05-20 RX ADMIN — Medication 1 MG: at 08:13

## 2024-05-20 RX ADMIN — HEPARIN SODIUM 5000 UNITS: 5000 INJECTION INTRAVENOUS; SUBCUTANEOUS at 06:13

## 2024-05-20 RX ADMIN — BUMETANIDE 2 MG: 1 TABLET ORAL at 18:03

## 2024-05-20 RX ADMIN — ACETAMINOPHEN 650 MG: 325 TABLET ORAL at 08:40

## 2024-05-20 RX ADMIN — ATORVASTATIN CALCIUM 40 MG: 40 TABLET, FILM COATED ORAL at 21:33

## 2024-05-20 RX ADMIN — HYDRALAZINE HYDROCHLORIDE 100 MG: 50 TABLET ORAL at 06:13

## 2024-05-20 RX ADMIN — ISOSORBIDE DINITRATE 40 MG: 20 TABLET ORAL at 21:32

## 2024-05-20 RX ADMIN — LOSARTAN POTASSIUM 100 MG: 50 TABLET, FILM COATED ORAL at 08:13

## 2024-05-20 RX ADMIN — BUMETANIDE 2 MG: 1 TABLET ORAL at 08:13

## 2024-05-20 RX ADMIN — EPOETIN ALFA-EPBX 6000 UNITS: 3000 INJECTION, SOLUTION INTRAVENOUS; SUBCUTANEOUS at 21:33

## 2024-05-20 RX ADMIN — ASPIRIN 81 MG CHEWABLE TABLET 81 MG: 81 TABLET CHEWABLE at 08:13

## 2024-05-20 RX ADMIN — ISOSORBIDE DINITRATE 40 MG: 20 TABLET ORAL at 08:13

## 2024-05-20 RX ADMIN — HYDRALAZINE HYDROCHLORIDE 100 MG: 50 TABLET ORAL at 21:33

## 2024-05-20 RX ADMIN — SERTRALINE 50 MG: 50 TABLET, FILM COATED ORAL at 08:13

## 2024-05-20 RX ADMIN — FINASTERIDE 5 MG: 5 TABLET, FILM COATED ORAL at 08:13

## 2024-05-20 RX ADMIN — HEPARIN SODIUM 5000 UNITS: 5000 INJECTION INTRAVENOUS; SUBCUTANEOUS at 21:33

## 2024-05-20 RX ADMIN — FERROUS SULFATE TAB 325 MG (65 MG ELEMENTAL FE) 325 MG: 325 (65 FE) TAB at 08:13

## 2024-05-20 ASSESSMENT — PAIN DESCRIPTION - PAIN TYPE: TYPE: ACUTE PAIN

## 2024-05-20 ASSESSMENT — PAIN - FUNCTIONAL ASSESSMENT: PAIN_FUNCTIONAL_ASSESSMENT: ACTIVITIES ARE NOT PREVENTED

## 2024-05-20 ASSESSMENT — PAIN DESCRIPTION - ONSET: ONSET: SUDDEN

## 2024-05-20 ASSESSMENT — PAIN DESCRIPTION - FREQUENCY: FREQUENCY: INTERMITTENT

## 2024-05-20 ASSESSMENT — PAIN SCALES - GENERAL
PAINLEVEL_OUTOF10: 3
PAINLEVEL_OUTOF10: 0

## 2024-05-20 ASSESSMENT — PAIN DESCRIPTION - DESCRIPTORS: DESCRIPTORS: ACHING

## 2024-05-20 ASSESSMENT — PAIN DESCRIPTION - LOCATION: LOCATION: GENERALIZED

## 2024-05-20 NOTE — PROGRESS NOTES
[facility]  [address] [place] [id number]     INPATIENT REHABILITATION  DAILY PROGRESS NOTE     Date: 5/20/2024    Name: Amish Shetty Age / Sex: 68 y.o. / male   CSN: [id number] MRN: [id number]   Admit Date: 5/9/2024 Length of Stay: 11 days     Primary Rehabilitation Diagnosis: Impaired Mobility and ADLs secondary to debility due to end-stage renal disease and amyloidosis and prior CVA with residual right hemiparesis     Subjective:     I personally saw and evaluated this patient face-to-face today.  I saw patient upstairs during hemodialysis.  Patient is laying in bed in no apparent distress.  Patient denies any discomfort.  Patient requests to liberalize fluid restriction.  I discussed with nephrologist and will liberalize fluid restriction to 1800 cc for every 24 hours    Objective:     Vital Signs:  Patient Vitals for the past 24 hrs:   BP Temp Temp src Pulse Resp SpO2   05/20/24 1800 (!) 161/63 98.6 °F (37 °C) Oral 60 18 97 %   05/20/24 1730 (!) 155/84 97.6 °F (36.4 °C) -- 51 18 --   05/20/24 1715 (!) 147/82 -- -- 52 -- --   05/20/24 1700 (!) 150/81 -- -- 53 -- --   05/20/24 1645 (!) 161/76 -- -- 54 -- --   05/20/24 1630 (!) 141/77 -- -- 52 -- --   05/20/24 1615 (!) 136/52 -- -- 51 -- --   05/20/24 1600 (!) 155/71 -- -- 51 -- --   05/20/24 1545 132/72 -- -- 55 -- --   05/20/24 1530 (!) 154/79 -- -- 50 -- --   05/20/24 1515 133/68 -- -- 52 -- --   05/20/24 1500 130/63 -- -- 55 -- --   05/20/24 1445 135/64 -- -- 57 -- --   05/20/24 1430 (!) 142/62 -- -- 56 -- --   05/20/24 1415 139/61 97.7 °F (36.5 °C) -- 54 18 --   05/20/24 0745 (!) 158/56 98 °F (36.7 °C) Oral 80 18 97 %   05/20/24 0615 (!) 153/66 -- -- 58 -- --   05/19/24 2045 (!) 136/52 98.7 °F (37.1 °C) Oral 64 18 98 %        Physical Examination:  General:  Awake, alert  Cardiovascular:  S1S2+, RRR  Pulmonary:  CTA b/l  GI:  Soft, BS+, NT, ND  Extremities:  No edema  Right hemiparesis from prior stroke      Current

## 2024-05-20 NOTE — PLAN OF CARE
Problem: Chronic Conditions and Co-morbidities  Goal: Patient's chronic conditions and co-morbidity symptoms are monitored and maintained or improved  5/19/2024 2218 by Glenys Weaver RN  Outcome: Progressing  Care Plan - Patient's Chronic Conditions and Co-Morbidity Symptoms are Monitored and Maintained or Improved: Monitor and assess patient's chronic conditions and comorbid symptoms for stability, deterioration, or improvement         Problem: Safety - Adult  Goal: Free from fall injury  5/19/2024 2218 by Glenys Weaver RN  Outcome: Progressing  Free From Fall Injury: Instruct family/caregiver on patient safety       Problem: Skin/Tissue Integrity  Goal: Absence of new skin breakdown  Description: 1.  Monitor for areas of redness and/or skin breakdown  2.  Assess vascular access sites hourly  5/19/2024 2218 by Glenys Weaver RN  Outcome: Progressing       Problem: Pain  Goal: Verbalizes/displays adequate comfort level or baseline comfort level  5/19/2024 2218 by Glenys Weaver RN  Outcome: Progressing  Verbalizes/displays adequate comfort level or baseline comfort level:   Encourage patient to monitor pain and request assistance   Assess pain using appropriate pain scale   Administer analgesics based on type and severity of pain and evaluate response   Implement non-pharmacological measures as appropriate and evaluate response   Consider cultural and social influences on pain and pain management   Notify Licensed Independent Practitioner if interventions unsuccessful or patient reports new pain

## 2024-05-20 NOTE — PLAN OF CARE
apparent distress in w/c mobilizing under own power  [] Patient left in no apparent distress dining area  [] Patient left in no apparent distress mobilizing under own power  [x] Call bell left within reach  [] Nursing notified  [] Caregiver present  [] Bed alarm activated   [x] Chair alarm activated        Lisette Smith, FRANCISCO  5/20/2024

## 2024-05-20 NOTE — PLAN OF CARE
Problem: Occupational Therapy - Adult  Goal: By Discharge: Performs self-care activities at highest level of function for planned discharge setting.  See evaluation for individualized goals.  Description: Occupational Therapy Goals   Long Term Goals  Initiated 5/10/24 and to be accomplished within 2 week(s)  1. Pt will perform self-feeding with Ginger.  2. Pt will perform grooming with Ginger.  3. Pt will perform UB bathing with supervision.  4. Pt will perform LB bathing with supervision.  5. Pt will perform tub/shower transfer with CGA.   6. Pt will perform UB dressing with setup.  7. Pt will perform LB dressing with supervision.  8. Pt will perform toileting task with CGA.  9. Pt will perform toilet transfer with CGA.      Short Term Goals   Initiated 5/10/24 and to be accomplished within 7 day(s)  1. Pt will perform self-feeding with setup.   2. Pt will perform grooming with setup.  3. Pt will perform UB bathing with Ravi.  4. Pt will perform LB bathing with CGA.  5. Pt will perform tub/shower transfer with Ravi.  6. Pt will perform UB dressing with SBA.  7. Pt will perform LB dressing with CGA.  8. Pt will perform toileting task with Ravi.  9. Pt will perform toilet transfer with Ravi.    Outcome: Progressing   Occupational Therapy TREATMENT    Patient: Amish Shetty   68 y.o.    Patient identified with name and : yes    Date: 2024    First Tx Session  Time In: 905  Time Out: 1000    Second Tx Session  Time In: 1130  Time Out: 1200    Diagnosis: Debility [R53.81]   Precautions:  Restrictions/Precautions: Fall Risk  Required Braces or Orthoses?: No                             Chart, occupational therapy assessment, plan of care, and goals were reviewed.     Pain:  Pt reports 4-5/10 pain or discomfort prior to treatment.    Pt reports 3-4/10 pain or discomfort post treatment.   Intervention Provided: activity      SUBJECTIVE:   Patient stated “My arm has been feeling like jello since I've been in the

## 2024-05-20 NOTE — PROGRESS NOTES
RENAL DAILY PROGRESS NOTE            68-year-old male with past medical history of renal myelomatosis stroke hypertensions heart failure history of GI bleed admitted for short of breath following for renal failure  Subjective:       Complaint:   His breathing is at baseline.  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  Hypertension  Proteinuria  Hx diabetes  Hx CVA  Anemia,followed by hematology as OP, on epogen  Hx iron deficiency   PLAN:   Dialysis today  He has now new place for dialysis at [facility] at 10.15am.   Adjust medication per ESRD status.              Current Facility-Administered Medications   Medication Dose Route Frequency    epoetin ole-epbx (RETACRIT) injection 6,000 Units  6,000 Units SubCUTAneous Once per day on Mon Wed Fri    hydrALAZINE (APRESOLINE) tablet 10 mg  10 mg Oral Q6H PRN    heparin (porcine) injection 3,800 Units  3,800 Units IntraCATHeter PRN    aspirin chewable tablet 81 mg  81 mg Oral Daily with breakfast    atorvastatin (LIPITOR) tablet 40 mg  40 mg Oral Nightly    Virt-Caps 1 mg  1 capsule Oral Daily    bumetanide (BUMEX) tablet 2 mg  2 mg Oral BID    ferrous sulfate (IRON 325) tablet 325 mg  325 mg Oral Daily with breakfast    finasteride (PROSCAR) tablet 5 mg  5 mg Oral Daily    fluticasone (FLONASE) 50 MCG/ACT nasal spray 2 spray  2 spray Each Nostril Daily    hydrALAZINE (APRESOLINE) tablet 100 mg  100 mg Oral 3 times per day    isosorbide dinitrate (ISORDIL) tablet 40 mg  40 mg Oral TID    losartan (COZAAR) tablet 100 mg  100 mg Oral Daily    sertraline (ZOLOFT) tablet 50 mg  50 mg Oral

## 2024-05-20 NOTE — DIALYSIS
HD Care plan  Time: 3 hrs  Dialysate: 3 K+   2.5 Ca++  Bath  Net UF: 1.5-2 L  Access: Aseptic care for RT Chest TDC  Hemodynamic stability: Maintain BP WNL     Pre Dialysis:  Patient received from Susan Rosales RN, Patient on a bed, A+O x 4, on RA  no s/s of acute distress noted. RT Chest TDC  assessed, dressing clean and intact. TDC accessed per protocol without any difficulty, line patent with good flow.     Intra Dialysis:  Time out / safety process performed per policy. Tx initiated at 1430.    TDC flowing with ease. For hemodynamic stability UF goal set at 2000 ml as tolerated.  Pt offered assistance with repositioning every 2 hours/prn    Vascular access visible and line connections remained intact throughout entire duration of treatment.   Vital signs checked every 15 mins.     Post Dialysis:  Tx completed at 1730,   Tolerated well, 2 L  removed.  De-accessed per protocol.    Dialysis catheter locked accordingly with Heparin 1.9 ml in arterial port, and 1.9 ml in venous port. Catheter dressing clean, dry and intact.  Post Dialysis report given to FRANSISCA Davis

## 2024-05-21 PROCEDURE — 97116 GAIT TRAINING THERAPY: CPT

## 2024-05-21 PROCEDURE — 94761 N-INVAS EAR/PLS OXIMETRY MLT: CPT

## 2024-05-21 PROCEDURE — 97110 THERAPEUTIC EXERCISES: CPT

## 2024-05-21 PROCEDURE — 1180000000 HC REHAB R&B

## 2024-05-21 PROCEDURE — 6370000000 HC RX 637 (ALT 250 FOR IP): Performed by: EMERGENCY MEDICINE

## 2024-05-21 PROCEDURE — 97530 THERAPEUTIC ACTIVITIES: CPT

## 2024-05-21 PROCEDURE — 97535 SELF CARE MNGMENT TRAINING: CPT

## 2024-05-21 PROCEDURE — 6360000002 HC RX W HCPCS: Performed by: EMERGENCY MEDICINE

## 2024-05-21 RX ADMIN — FERROUS SULFATE TAB 325 MG (65 MG ELEMENTAL FE) 325 MG: 325 (65 FE) TAB at 08:47

## 2024-05-21 RX ADMIN — HYDRALAZINE HYDROCHLORIDE 100 MG: 50 TABLET ORAL at 21:36

## 2024-05-21 RX ADMIN — HEPARIN SODIUM 5000 UNITS: 5000 INJECTION INTRAVENOUS; SUBCUTANEOUS at 13:40

## 2024-05-21 RX ADMIN — Medication 1 MG: at 08:47

## 2024-05-21 RX ADMIN — SERTRALINE 50 MG: 50 TABLET, FILM COATED ORAL at 08:47

## 2024-05-21 RX ADMIN — HEPARIN SODIUM 5000 UNITS: 5000 INJECTION INTRAVENOUS; SUBCUTANEOUS at 21:37

## 2024-05-21 RX ADMIN — LOSARTAN POTASSIUM 100 MG: 50 TABLET, FILM COATED ORAL at 08:47

## 2024-05-21 RX ADMIN — HEPARIN SODIUM 5000 UNITS: 5000 INJECTION INTRAVENOUS; SUBCUTANEOUS at 06:32

## 2024-05-21 RX ADMIN — ISOSORBIDE DINITRATE 40 MG: 20 TABLET ORAL at 08:47

## 2024-05-21 RX ADMIN — BUMETANIDE 2 MG: 1 TABLET ORAL at 08:46

## 2024-05-21 RX ADMIN — FINASTERIDE 5 MG: 5 TABLET, FILM COATED ORAL at 08:47

## 2024-05-21 RX ADMIN — HYDRALAZINE HYDROCHLORIDE 100 MG: 50 TABLET ORAL at 13:40

## 2024-05-21 RX ADMIN — ISOSORBIDE DINITRATE 40 MG: 20 TABLET ORAL at 21:36

## 2024-05-21 RX ADMIN — BUMETANIDE 2 MG: 1 TABLET ORAL at 17:40

## 2024-05-21 RX ADMIN — ASPIRIN 81 MG CHEWABLE TABLET 81 MG: 81 TABLET CHEWABLE at 08:47

## 2024-05-21 RX ADMIN — ATORVASTATIN CALCIUM 40 MG: 40 TABLET, FILM COATED ORAL at 21:36

## 2024-05-21 RX ADMIN — HYDRALAZINE HYDROCHLORIDE 100 MG: 50 TABLET ORAL at 06:34

## 2024-05-21 RX ADMIN — ISOSORBIDE DINITRATE 40 MG: 20 TABLET ORAL at 13:40

## 2024-05-21 ASSESSMENT — PAIN SCALES - GENERAL
PAINLEVEL_OUTOF10: 0

## 2024-05-21 NOTE — PROGRESS NOTES
RENAL DAILY PROGRESS NOTE            68-year-old male with past medical history of renal myelomatosis stroke hypertensions heart failure history of GI bleed admitted for short of breath following for renal failure  Subjective:       Complaint:   His breathing is at baseline.  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  Hypertension  Proteinuria  Hx diabetes  Hx CVA  Anemia,followed by hematology as OP, on epogen  Hx iron deficiency   PLAN:   Dialysis tomorrow  He has now new place for dialysis at [facility] at 10.15am.   Adjust medication per ESRD status.              Current Facility-Administered Medications   Medication Dose Route Frequency    epoetin ole-epbx (RETACRIT) injection 6,000 Units  6,000 Units SubCUTAneous Once per day on Mon Wed Fri    hydrALAZINE (APRESOLINE) tablet 10 mg  10 mg Oral Q6H PRN    heparin (porcine) injection 3,800 Units  3,800 Units IntraCATHeter PRN    aspirin chewable tablet 81 mg  81 mg Oral Daily with breakfast    atorvastatin (LIPITOR) tablet 40 mg  40 mg Oral Nightly    Virt-Caps 1 mg  1 capsule Oral Daily    bumetanide (BUMEX) tablet 2 mg  2 mg Oral BID    ferrous sulfate (IRON 325) tablet 325 mg  325 mg Oral Daily with breakfast    finasteride (PROSCAR) tablet 5 mg  5 mg Oral Daily    fluticasone (FLONASE) 50 MCG/ACT nasal spray 2 spray  2 spray Each Nostril Daily    hydrALAZINE (APRESOLINE) tablet 100 mg  100 mg Oral 3 times per day    isosorbide dinitrate (ISORDIL) tablet 40 mg  40 mg Oral TID    losartan (COZAAR) tablet 100 mg  100 mg Oral Daily    sertraline (ZOLOFT) tablet 50 mg  50 mg

## 2024-05-21 NOTE — PROGRESS NOTES
[facility] Physical Rehabilitation  Team Conference  Date: 5/21/2024  ACTIVE MONITORING OF CO-MORBID CONDITIONS/MANAGEMENT OF NEW MEDICAL ISSUES: Debility [R53.81]    **Please refer to patient's electronic medical record to view the care plan and goals**  NURSING Making gains Yes   Skin Care: Skin Integumentary   Skin Color: Ecchymosis (comment)  Skin Condition/Temp: Dry, Warm  Skin Integrity: Vascular discoloration or hemochromatosis/staining  Location: RUE  Skin Fold Management: No    Wound Care: none       Pain: Pain Assessment  Pain Assessment: 0-10 (05/21/24 0400)  Pain Level: 0 (05/21/24 0400)  Hagen-Gale Pain Rating: No hurt (05/10/24 1200)  Patient's Stated Pain Goal: Unable to verbalize/indicate pain goal (sleeping) (05/21/24 0400)  Pain Location: Generalized (05/20/24 0840)  Pain Descriptors: Aching (05/20/24 0840)  Functional Pain Assessment: Activities are not prevented (05/20/24 0840)  Pain Type: Acute pain (05/20/24 0840)  Pain Frequency: Intermittent (05/20/24 0840)  Pain Onset: Sudden (05/20/24 0840)  Non-Pharmaceutical Pain Intervention(s): Ambulation/Increased Activity (05/20/24 0840)  Response to Pain Intervention: Patient satisfied (05/20/24 0930)  Side Effects: No reported side effects (05/20/24 0930)  Multiple Pain Sites: No (05/20/24 0930)    Bladder/Bowel Urine Assessment  Urinary Status: Voiding  Urinary Incontinence: Absent  Urine Color: Eloisa  Urine Appearance: Clear  Urine Odor: No odor Stool Assessment  Incontinence: No  Stool Appearance: Soft  Stool Color: Brown  Stool Amount: Large  Stool Source: Rectum  Last BM (including prior to admit): 05/20/24   Goal: Patient will remain free of falls.       Barrier: right sided hemiparesis      Intervention: fall precautions       Lab value concerns   No       Occupational Therapy  Making gains  Yes   Goal: Patient will perform shower transfers with supervision.       Barrier: decreased balance and strength       Intervention: transfer

## 2024-05-21 NOTE — DISCHARGE INSTRUCTIONS
Patient armband removed and given to patient to take home.  Patient was informed of the privacy risks if armband lost or stolen      DISCHARGE SUMMARY from Nurse    PATIENT INSTRUCTIONS:    After general anesthesia or intravenous sedation, for 24 hours or while taking prescription Narcotics:  Limit your activities  Do not drive and operate hazardous machinery  Do not make important personal or business decisions  Do  not drink alcoholic beverages  If you have not urinated within 8 hours after discharge, please contact your surgeon on call.    Report the following to your surgeon:  Excessive pain, swelling, redness or odor of or around the surgical area  Temperature over 100.5  Nausea and vomiting lasting longer than 4 hours or if unable to take medications  Any signs of decreased circulation or nerve impairment to extremity: change in color, persistent  numbness, tingling, coldness or increase pain  Any questions    What to do at Home:  Recommended activity: activity as tolerated    If you experience any of the following symptoms shortness of breath, chest pain, diarrhea, vomiting, severe pain, fever  , please follow up with PCP.    *  Please give a list of your current medications to your Primary Care Provider.    *  Please update this list whenever your medications are discontinued, doses are      changed, or new medications (including over-the-counter products) are added.    *  Please carry medication information at all times in case of emergency situations.    These are general instructions for a healthy lifestyle:    No smoking/ No tobacco products/ Avoid exposure to second hand smoke  Surgeon General's Warning:  Quitting smoking now greatly reduces serious risk to your health.    Obesity, smoking, and sedentary lifestyle greatly increases your risk for illness    A healthy diet, regular physical exercise & weight monitoring are important for maintaining a healthy lifestyle    You may be retaining fluid if you

## 2024-05-21 NOTE — PROGRESS NOTES
[facility]  [address] [place] [id number]     INPATIENT REHABILITATION  DAILY PROGRESS NOTE     Date: 5/21/2024    Name: Aimsh Shetty Age / Sex: 68 y.o. / male   CSN: [id number] MRN: [id number]   Admit Date: 5/9/2024 Length of Stay: 12 days     Primary Rehabilitation Diagnosis: Impaired Mobility and ADLs secondary to debility due to end-stage renal disease and amyloidosis and prior CVA with residual right hemiparesis     Subjective:     I personally saw and evaluated this patient face-to-face today. Patient is laying in wheelchair in no apparent distress.  Patient denies any discomfort. Patient's wife is at bedside during evaluation.     Patient has some questions regarding discharge. NP will go over dc with patient and wife tomorrow am. Patient has no significant complaints. Patient looking forward to leaving tomorrow.   Objective:     Vital Signs:  Patient Vitals for the past 24 hrs:   BP Temp Temp src Pulse Resp SpO2   05/21/24 1230 137/63 -- -- -- -- --   05/21/24 0800 (!) 152/57 97.8 °F (36.6 °C) Oral 60 18 99 %   05/21/24 0630 (!) 173/66 -- -- -- -- --   05/20/24 2131 (!) 146/60 99.1 °F (37.3 °C) Oral 61 20 95 %   05/20/24 1800 (!) 161/63 98.6 °F (37 °C) Oral 60 18 97 %   05/20/24 1730 (!) 155/84 97.6 °F (36.4 °C) -- 51 18 --   05/20/24 1715 (!) 147/82 -- -- 52 -- --   05/20/24 1700 (!) 150/81 -- -- 53 -- --   05/20/24 1645 (!) 161/76 -- -- 54 -- --   05/20/24 1630 (!) 141/77 -- -- 52 -- --   05/20/24 1615 (!) 136/52 -- -- 51 -- --   05/20/24 1600 (!) 155/71 -- -- 51 -- --   05/20/24 1545 132/72 -- -- 55 -- --   05/20/24 1530 (!) 154/79 -- -- 50 -- --   05/20/24 1515 133/68 -- -- 52 -- --   05/20/24 1500 130/63 -- -- 55 -- --   05/20/24 1445 135/64 -- -- 57 -- --   05/20/24 1430 (!) 142/62 -- -- 56 -- --          Physical Examination:  General:  Awake, alert  Cardiovascular:  S1S2+, RRR  Pulmonary:  CTA b/l  GI:  Soft, BS+, NT, ND  Extremities:  No edema  Right

## 2024-05-21 NOTE — PLAN OF CARE
OCCUPATIONAL THERAPY DISCHARGE  Occupational Therapy Goals   Long Term Goals  Initiated 5/10/24 and to be accomplished within 2 week(s)  1. Pt will perform self-feeding with Ginger. NM 24 (SBA)  2. Pt will perform grooming with Ginger. NM 24  (SBA)  3. Pt will perform UB bathing with supervision.  24  4. Pt will perform LB bathing with supervision.  NM 24 (CGA)  5. Pt will perform tub/shower transfer with CGA.  24  6. Pt will perform UB dressing with setup.   24  7. Pt will perform LB dressing with supervision.  NM 24 (Raymon)  8. Pt will perform toileting task with CGA. NM 24 (SBA)  9. Pt will perform toilet transfer with CGA.   24        Short Term Goals   Initiated 5/10/24 and to be accomplished within 7 day(s)  1. Pt will perform self-feeding with setup.  24  2. Pt will perform grooming with setup.  24  3. Pt will perform UB bathing with Raymon.  24  4. Pt will perform LB bathing with CGA. 24  5. Pt will perform tub/shower transfer with Raymon. 24  6. Pt will perform UB dressing with SBA.  24  7. Pt will perform LB dressing with CGA.   24  8. Pt will perform toileting task with Raymon.  24  9. Pt will perform toilet transfer with Raymon.  24        Patient: Amish Shetty (68 y.o. male)  Date: 2024    First Tx Session  Time In: 0710  Time Out: 0840     First Tx Session  Time In: 1400  Time Out: 1430  Primary Diagnosis: Debility [R53.81] Debility    Precautions: Fall Restrictions/Precautions: Fall Risk  Required Braces or Orthoses?: No                             Barriers to Learning/Limitations: yes;  cognitive  Compensate with: visual, verbal, tactile, kinesthetic cues/model     Patient identified with name and :Yes     SUBJECTIVE:   Patient stated “I had a stroke.”    OBJECTIVE DATA SUMMARY:     Past Medical History:   Diagnosis Date    Acute ischemic stroke (HCC) 2019    Acute Ischemic Stroke

## 2024-05-21 NOTE — PLAN OF CARE
movement patterns, as pt has long term learned compensatory movement patterns. Pt moves too quickly with gait and txfrs increasing risk of LOB/fall. Pt will benefit from continued skilled intervention to improve strength and endurance to improve balance and safety.   LTGs: not met     PLAN:  Pt would benefit from continued skilled physical therapy in order to improve independent functional mobility at home health level. Interventions may include range of motion (AROM, PROM B LE/trunk), motor function (B LE/trunk strengthening/coordination), activity tolerance (vitals, oxygen saturation levels), bed mobility training, balance activities, gait training (progressive ambulation program), and functional transfer training.     Discharge Recommendations:  24 hour supervision or assist;Home with Home health PT;Outpatient PT  Further Equipment Recommendations for Discharge:               pt has all equipment       Activity Tolerance:   good  Please refer to the flowsheet for vital signs taken during this treatment.  After treatment:   [] Patient left in no apparent distress in bed  [x] Patient left in no apparent distress sitting up in chair  [] Patient left in no apparent distress in w/c mobilizing under own power  [] Patient left in no apparent distress dining area  [] Patient left in no apparent distress mobilizing under own power  [x] Call bell left within reach  [] Nursing notified  [] Caregiver present  [] Bed alarm activated   [] Chair alarm activated        IRF-DIANA Completed: rad Smith, PTA  5/21/2024

## 2024-05-21 NOTE — PLAN OF CARE
Problem: Chronic Conditions and Co-morbidities  Goal: Patient's chronic conditions and co-morbidity symptoms are monitored and maintained or improved  Outcome: Progressing  Flowsheets (Taken 5/20/2024 2009)  Care Plan - Patient's Chronic Conditions and Co-Morbidity Symptoms are Monitored and Maintained or Improved: Monitor and assess patient's chronic conditions and comorbid symptoms for stability, deterioration, or improvement     Problem: Safety - Adult  Goal: Free from fall injury  Outcome: Progressing  Flowsheets (Taken 5/20/2024 2233)  Free From Fall Injury: Instruct family/caregiver on patient safety     Problem: Physical Therapy - Adult  Goal: By Discharge: Performs mobility at highest level of function for planned discharge setting.  See evaluation for individualized goals.  Description: Physical Therapy Short Term Goals  = Long Term Goals  Initiated 5/10/2024, re-assessed 5/17/2024 and to be accomplished by d/c (5/22/2024)  1.  Patient will supine to sit, sit to supine, roll right, and roll left  in bed with modified independence.  (MET 5/17/2024)  2.  Patient will transfer from bed to chair and chair to bed with modified independence using the least restrictive device.(SBA/CGA with SBQC 5/17/2024)  3.  Patient will perform sit to stand with modified independence(SBA 5/17/2024)  4.  Patient will ambulate with modified independence for 50 feet with the least restrictive device. (SBA/CGA with SBQC 5/17/2024)  5.  Patient will ascend/descend 4 stairs with 1 handrail(s) with supervision/set-up.(CGA 5/17/2024)        5/20/2024 1217 by Lisette Smith PTA  Outcome: Progressing     Problem: Skin/Tissue Integrity  Goal: Absence of new skin breakdown  Description: 1.  Monitor for areas of redness and/or skin breakdown  2.  Assess vascular access sites hourly  3.  Every 4-6 hours minimum:  Change oxygen saturation probe site  4.  Every 4-6 hours:  If on nasal continuous positive airway pressure, respiratory

## 2024-05-21 NOTE — PROGRESS NOTES
0800 PT. Awake sitting up in bed alert and oriented x 4 no change in assessment.   0930 Pt. Sitting up in chair eating breakfast.   1200 no complaints.   1330 able to transfer from bed to chair with assist.   1500 no change in assessment. Plan for discharge tomorrow have dialysis at 1030 am.  1800 Pt. Sitting up in chair eating dinner.

## 2024-05-22 ENCOUNTER — TELEPHONE (OUTPATIENT)
Facility: HOSPITAL | Age: 69
End: 2024-05-22

## 2024-05-22 VITALS
OXYGEN SATURATION: 100 % | HEIGHT: 69 IN | SYSTOLIC BLOOD PRESSURE: 157 MMHG | BODY MASS INDEX: 27.25 KG/M2 | RESPIRATION RATE: 18 BRPM | WEIGHT: 184 LBS | HEART RATE: 52 BPM | DIASTOLIC BLOOD PRESSURE: 74 MMHG | TEMPERATURE: 97.2 F

## 2024-05-22 PROCEDURE — 97112 NEUROMUSCULAR REEDUCATION: CPT

## 2024-05-22 PROCEDURE — 97116 GAIT TRAINING THERAPY: CPT

## 2024-05-22 PROCEDURE — 6360000002 HC RX W HCPCS: Performed by: EMERGENCY MEDICINE

## 2024-05-22 PROCEDURE — 6370000000 HC RX 637 (ALT 250 FOR IP): Performed by: EMERGENCY MEDICINE

## 2024-05-22 PROCEDURE — 2700000000 HC OXYGEN THERAPY PER DAY

## 2024-05-22 PROCEDURE — 90935 HEMODIALYSIS ONE EVALUATION: CPT

## 2024-05-22 PROCEDURE — 94761 N-INVAS EAR/PLS OXIMETRY MLT: CPT

## 2024-05-22 PROCEDURE — 97530 THERAPEUTIC ACTIVITIES: CPT

## 2024-05-22 PROCEDURE — 99239 HOSP IP/OBS DSCHRG MGMT >30: CPT | Performed by: NURSE PRACTITIONER

## 2024-05-22 RX ORDER — ACETAMINOPHEN 325 MG/1
650 TABLET ORAL EVERY 4 HOURS PRN
Qty: 30 TABLET | Refills: 0
Start: 2024-05-22

## 2024-05-22 RX ORDER — POLYETHYLENE GLYCOL 3350 17 G/17G
17 POWDER, FOR SOLUTION ORAL DAILY PRN
Qty: 20 EACH | Refills: 0
Start: 2024-05-22

## 2024-05-22 RX ORDER — FUROSEMIDE 20 MG/1
40 TABLET ORAL 2 TIMES DAILY
Qty: 120 TABLET | Refills: 0
Start: 2024-05-22

## 2024-05-22 RX ORDER — ISOSORBIDE DINITRATE 40 MG/1
40 TABLET ORAL 3 TIMES DAILY
Qty: 90 TABLET | Refills: 0 | Status: SHIPPED | OUTPATIENT
Start: 2024-05-22

## 2024-05-22 RX ADMIN — ISOSORBIDE DINITRATE 40 MG: 20 TABLET ORAL at 14:41

## 2024-05-22 RX ADMIN — HYDRALAZINE HYDROCHLORIDE 100 MG: 50 TABLET ORAL at 06:35

## 2024-05-22 RX ADMIN — HEPARIN SODIUM 5000 UNITS: 5000 INJECTION INTRAVENOUS; SUBCUTANEOUS at 06:30

## 2024-05-22 RX ADMIN — SERTRALINE 50 MG: 50 TABLET, FILM COATED ORAL at 08:41

## 2024-05-22 RX ADMIN — HEPARIN SODIUM 5000 UNITS: 5000 INJECTION INTRAVENOUS; SUBCUTANEOUS at 14:40

## 2024-05-22 RX ADMIN — BUMETANIDE 2 MG: 1 TABLET ORAL at 08:18

## 2024-05-22 RX ADMIN — Medication 1 MG: at 08:17

## 2024-05-22 RX ADMIN — ISOSORBIDE DINITRATE 40 MG: 20 TABLET ORAL at 08:27

## 2024-05-22 RX ADMIN — FERROUS SULFATE TAB 325 MG (65 MG ELEMENTAL FE) 325 MG: 325 (65 FE) TAB at 08:18

## 2024-05-22 RX ADMIN — HYDRALAZINE HYDROCHLORIDE 100 MG: 50 TABLET ORAL at 14:41

## 2024-05-22 RX ADMIN — LOSARTAN POTASSIUM 100 MG: 50 TABLET, FILM COATED ORAL at 08:27

## 2024-05-22 RX ADMIN — ASPIRIN 81 MG CHEWABLE TABLET 81 MG: 81 TABLET CHEWABLE at 08:18

## 2024-05-22 RX ADMIN — FINASTERIDE 5 MG: 5 TABLET, FILM COATED ORAL at 08:18

## 2024-05-22 ASSESSMENT — PAIN SCALES - GENERAL
PAINLEVEL_OUTOF10: 0

## 2024-05-22 NOTE — PLAN OF CARE
INTERVENTION:  HEMODYNAMIC STABILIZATION  MAINTAIN BP WNL WHILE ON HD.     INTERVENTION:  FLUID MANAGEMENT  WILL ATTEMPT [phone] ML TOTAL FLUID REMOVAL AS TOLERATED.     INTERVENTION:  METABOLIC/ELECTROLYTE MANAGEMENT  2.0 POTASSIUM 2.5 CALCIUM DIALYSATE USED WITH HD TODAY.     INTERVENTION:  HEMODIALYSIS ACCESS SITE MANAGEMENT  RIGHT TDC ACCESSED USING ASEPTIC TECHNIQUE.     GOAL:  SIGNS AND SYMPTOMS OF LISTED POTENTIAL PROBLEMS WILL BE ABSENT OR MANAGEABLE.     OUTCOME:  PROGRESSING.     HD PLANNED FOR 3 HOURS TODAY.      Problem: Chronic Conditions and Co-morbidities  Goal: Patient's chronic conditions and co-morbidity symptoms are monitored and maintained or improved  5/22/2024 1248 by Gisele Melo RN  Outcome: Progressing

## 2024-05-22 NOTE — DISCHARGE SUMMARY
[facility]  [address] [place] [id number]     INPATIENT REHABILITATION  DISCHARGE SUMMARY    Name: Amish Sehtty MRN: [id number]   Age / Sex: 68 y.o. / male CSN: [id number]   YOB: 1955 Length of Stay: 13 days   Admit Date: 5/9/2024 Discharge Date: 05/22/2024       Patient was switched from Bumex to Lasix 40 mg BID prior to discharge at the recommendation of Nephrology. Patient agreeable and appreciative.     PRIMARY CARE PHYSICIAN: Beth Cheema, NP-C    DISCHARGE DIAGNOSES:    Primary Rehabilitation Diagnosis   1. Impaired Mobility and ADLs secondary to debility due to end-stage renal disease and amyloidosis and prior CVA with residual right hemiparesis.    CONSULTS CALLED: None    PROCEDURES DONE: None    BRIEF HISTORY: (from the history and physical completed by Dr. Clinton Zamora)   This is a 68-year-old male with a past medical history of amyloidosis and nephrotic syndrome and a prior CVA with right hemiparesis and hypertension who was recently admitted to [facility].  Prior to admission patient had chronic kidney disease stage V.  During this hospitalization it was felt that patient is now end-stage renal disease and patient was initiated on hemodialysis by nephrology.  Patient was evaluated by PT and OT.  Patient was noted to be weak and debilitated.  It was felt that patient may benefit from transitioning to the inpatient rehab facility.  For full details regarding hospital course please refer to chart.    The patient  was noted to have impaired mobility and ADLs. Patient was felt to be a good candidate for acute inpatient rehabilitation. Upon evaluation by Physical Therapy and Occupational Therapy, the patient was recommended for acute inpatient rehabilitation. The patient was discharged and was subsequently admitted to the [facility] for intensive rehabilitation to help recover strength, function and mobility

## 2024-05-22 NOTE — PROGRESS NOTES
TEAM CONFERENCE FOLLOW-UP  Patient: Amish Shetty (68 y.o. male)  Date: 5/22/2024  Diagnosis: Debility [R53.81] Debility      Precautions:      Met with patient to discuss the findings from 5/22/2024 Team Conference.    Patient requested further information and/or had questions:   Marilyn Diane

## 2024-05-22 NOTE — PLAN OF CARE
Problem: Chronic Conditions and Co-morbidities  Goal: Patient's chronic conditions and co-morbidity symptoms are monitored and maintained or improved  5/22/2024 0751 by Susan Rosales RN  Outcome: Progressing  5/21/2024 2220 by Sherry Manrique RN  Outcome: Progressing  Flowsheets (Taken 5/21/2024 2000)  Care Plan - Patient's Chronic Conditions and Co-Morbidity Symptoms are Monitored and Maintained or Improved: Monitor and assess patient's chronic conditions and comorbid symptoms for stability, deterioration, or improvement     Problem: Safety - Adult  Goal: Free from fall injury  5/22/2024 0751 by Susan Rosales RN  Outcome: Progressing  5/21/2024 2220 by Sherry Manrique RN  Outcome: Progressing  Flowsheets (Taken 5/21/2024 2220)  Free From Fall Injury: Instruct family/caregiver on patient safety     Problem: Physical Therapy - Adult  Goal: By Discharge: Performs mobility at highest level of function for planned discharge setting.  See evaluation for individualized goals.  Description: Physical Therapy Short Term Goals  = Long Term Goals  Initiated 5/10/2024, re-assessed 5/21/2024 for d/c (5/22/2024)  1.  Patient will supine to sit, sit to supine, roll right, and roll left  in bed with modified independence.  (MET 5/17/2024)  2.  Patient will transfer from bed to chair and chair to bed with modified independence using the least restrictive device.(SBA/CGA with SBQC 5/21/2024)  3.  Patient will perform sit to stand with modified independence(SBA 5/21/2024)  4.  Patient will ambulate with modified independence for 50 feet with the least restrictive device. (SBA/CGA with SBQC 5/21/2024)  5.  Patient will ascend/descend 4 stairs with 1 handrail(s) with supervision/set-up.(SBACGA 5/21/2024)        5/21/2024 1818 by Lisette Smith PTA  Outcome: Progressing     Problem: Skin/Tissue Integrity  Goal: Absence of new skin breakdown  Description: 1.  Monitor for areas of redness and/or skin breakdown  2.  Assess

## 2024-05-22 NOTE — HOME CARE
Received resumption of care orders for:  SN / PT / OT / MSW - labs.    Referral has been updated and sent to central intake, scheduling for processing.       ---   WANG Keen [facility] Care Liaison

## 2024-05-22 NOTE — PROGRESS NOTES
HD Care plan  Time: 3 hrs  Dialysate: 2K+  2.5Ca++  Bath  Net UF: 1.5-2L  Access: Aseptic care for Right TDC  Hemodynamic stability: Maintain BP WNL     Pre Dialysis:  Patient received from BORIS Rosales RN . Patient arrived on a bed, A+O x 4, on RA, no s/s of acute distress noted. Right TDC assessed, no abnormalities noted. TDC accessed per protocol without any difficulty, line patent with good flow.     Intra Dialysis:  Time out / safety process performed per policy. Tx initiated at 1105.    TDC flowing with ease. For hemodynamic stability UF goal set at 2000 ml as tolerated.  Pt offered assistance with repositioning every 2 hours/prn    Vascular access visible and line connections remained intact throughout entire duration of treatment.   Vital signs checked every 15 mins.     Post Dialysis:  Tx completed at 1405,   Tolerated well, 2 L  removed. De-accessed per protocol.    Dialysis catheter locked accordingly with Heparin 1.9 ml in arterial port, and 1.9 ml in venous port. Catheter dressing clean, dry and intact.  Post Dialysis report given to BORIS Rosales RN

## 2024-05-22 NOTE — PLAN OF CARE
Problem: Physical Therapy - Adult  Goal: By Discharge: Performs mobility at highest level of function for planned discharge setting.  See evaluation for individualized goals.  Description: Physical Therapy Short Term Goals  = Long Term Goals  Initiated 5/10/2024, re-assessed 5/21/2024 for d/c (5/22/2024)  1.  Patient will supine to sit, sit to supine, roll right, and roll left  in bed with modified independence.  (MET 5/17/2024)  2.  Patient will transfer from bed to chair and chair to bed with modified independence using the least restrictive device.(SBA/CGA with SBQC 5/21/2024)  3.  Patient will perform sit to stand with modified independence(SBA 5/21/2024)  4.  Patient will ambulate with modified independence for 50 feet with the least restrictive device. (SBA/CGA with SBQC 5/21/2024)  5.  Patient will ascend/descend 4 stairs with 1 handrail(s) with supervision/set-up.(SBACGA 5/21/2024)        5/22/2024 1048 by Lisette Smith, FRANCISCO  Outcome: Progressing

## 2024-05-22 NOTE — PLAN OF CARE
Problem: Physical Therapy - Adult  Goal: By Discharge: Performs mobility at highest level of function for planned discharge setting.  See evaluation for individualized goals.  Description: Physical Therapy Short Term Goals  = Long Term Goals  Initiated 5/10/2024, re-assessed 2024 for d/c (2024)  1.  Patient will supine to sit, sit to supine, roll right, and roll left  in bed with modified independence.  (MET 2024)  2.  Patient will transfer from bed to chair and chair to bed with modified independence using the least restrictive device.(SBA/CGA with SBQC 2024)  3.  Patient will perform sit to stand with modified independence(SBA 2024)  4.  Patient will ambulate with modified independence for 50 feet with the least restrictive device. (SBA/CGA with SBQC 2024)  5.  Patient will ascend/descend 4 stairs with 1 handrail(s) with supervision/set-up.(SBACGA 2024)        Outcome: Progressing     PHYSICAL THERAPY TREATMENT    Patient: Amish Shetty (68 y.o. male)  Date: 2024  Diagnosis: Debility [R53.81]   Precautions: fall risk  Chart, physical therapy assessment, plan of care and goals were reviewed.    Time in: 952  Time out :     Patient seen for: w/c mobility, txfr training, gait training, stair training, neuro re-ed    Pain:  Pt pain was reported as no c/o pre-treatment.  Pt pain was reported as no c/o post-treatment.  Intervention: NA    Patient identified with name and : Yes    SUBJECTIVE:      Pt reports not knowing he had therapy today and was told he would be going to dialysis at 7am but whole schedule changed this morning.     OBJECTIVE DATA SUMMARY:    Objective:   Education: Education Given To: Patient  Education Provided: Safety  Education Method: Verbal  Education Outcome: Verbalized understanding    TRANSFERS Daily Assessment    Sit to Stand Stand by assistance pushing up with left UE from left arm rest with WBOS for compensation     Transfer Assist Score

## 2024-05-22 NOTE — TELEPHONE ENCOUNTER
Pt called in to inform that he plans to nani his PT when he finish his home health. Pt was discharged from the hospital today.

## 2024-05-22 NOTE — PROGRESS NOTES
RENAL DAILY PROGRESS NOTE            68-year-old male with past medical history of renal myelomatosis stroke hypertensions heart failure history of GI bleed admitted for short of breath following for renal failure  Subjective:       Complaint:   His breathing is at baseline.  Denies for any chest pain,   At 12:05 PM on 5/22/2024, I saw and examined patient during hemodialysis treatment. The patient was receiving hemodialysis for treatment of  renal failure. I have also reviewed vital signs, intake and output, lab results and recent events, and agreed with today's dialysis order.       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  Hypertension  Proteinuria  Hx diabetes  Hx CVA  Anemia,followed by hematology as OP, on epogen  Hx iron deficiency   PLAN:   Dialysis ongoing  He has now new place for dialysis at [facility] at 10.15am.   Adjust medication per ESRD status.   Ok for discharge from my end.             Current Facility-Administered Medications   Medication Dose Route Frequency    epoetin ole-epbx (RETACRIT) injection 6,000 Units  6,000 Units SubCUTAneous Once per day on Mon Wed Fri    hydrALAZINE (APRESOLINE) tablet 10 mg  10 mg Oral Q6H PRN    heparin (porcine) injection 3,800 Units  3,800 Units IntraCATHeter PRN    aspirin chewable tablet 81 mg  81 mg Oral Daily with breakfast    atorvastatin (LIPITOR) tablet 40 mg  40 mg Oral Nightly    Virt-Caps 1 mg  1 capsule Oral Daily    bumetanide (BUMEX) tablet 2 mg  2 mg Oral BID    ferrous sulfate (IRON 325) tablet 325 mg  325 mg Oral Daily with breakfast    finasteride (PROSCAR) tablet 5 mg  5 mg Oral Daily    fluticasone

## 2024-05-22 NOTE — PLAN OF CARE
Problem: Chronic Conditions and Co-morbidities  Goal: Patient's chronic conditions and co-morbidity symptoms are monitored and maintained or improved  Outcome: Progressing  Flowsheets (Taken 5/21/2024 2000)  Care Plan - Patient's Chronic Conditions and Co-Morbidity Symptoms are Monitored and Maintained or Improved: Monitor and assess patient's chronic conditions and comorbid symptoms for stability, deterioration, or improvement     Problem: Safety - Adult  Goal: Free from fall injury  Outcome: Progressing  Flowsheets (Taken 5/21/2024 2220)  Free From Fall Injury: Instruct family/caregiver on patient safety     Problem: Skin/Tissue Integrity  Goal: Absence of new skin breakdown  Description: 1.  Monitor for areas of redness and/or skin breakdown  2.  Assess vascular access sites hourly  3.  Every 4-6 hours minimum:  Change oxygen saturation probe site  4.  Every 4-6 hours:  If on nasal continuous positive airway pressure, respiratory therapy assess nares and determine need for appliance change or resting period.  Outcome: Progressing     Problem: Pain  Goal: Verbalizes/displays adequate comfort level or baseline comfort level  Outcome: Progressing

## 2024-05-22 NOTE — PROGRESS NOTES
Prior to discharge, nurse practitioner discussed and went through current and discharge medications in detail with the patient and wife (via phone) at the bedside. The NP discussed which drugs to discontinue, resume, and continue after discharge. NP explained and answered questions about follow-up care/pcp/specialist appointments, as well as discharge process expectations. The patient expressed his understanding verbally. Patient will be discharged with home health, skilled nursing, PT/OT. NP discussed discharge process with discharge RN.     MICHELLE Michaels - CNP

## 2024-05-22 NOTE — PROGRESS NOTES
Amish Shetty 68 y.o. male was discharged home on 05/22/24. Patient's armband removed and given to patient to take home.  Patient was informed of the privacy risks if armband lost or stolen. Discharge instructions were reviewed with patient and spouse, and he verbalized understanding. The patient was wheeled out to transportation vehicle by a staff member along with the patient's belongings. Transportation was provided by private vehicle.    Susan Rosales RN

## 2024-05-22 NOTE — FLOWSHEET NOTE
05/22/24 1524   AVS Reviewed   AVS & discharge instructions reviewed with patient and/or representative? Yes   Reviewed instructions with Patient;Other (name and relationship in comment)   Level of Understanding Questions answered;Teach back completed;Verbalized understanding

## 2024-05-23 ENCOUNTER — HOSPITAL ENCOUNTER (OUTPATIENT)
Facility: HOSPITAL | Age: 69
Setting detail: SPECIMEN
Discharge: HOME | End: 2024-05-23
Payer: MEDICARE

## 2024-05-23 ENCOUNTER — HOME CARE VISIT (OUTPATIENT)
Age: 69
End: 2024-05-23
Payer: MEDICARE

## 2024-05-23 VITALS
HEART RATE: 74 BPM | RESPIRATION RATE: 20 BRPM | TEMPERATURE: 99 F | SYSTOLIC BLOOD PRESSURE: 120 MMHG | DIASTOLIC BLOOD PRESSURE: 60 MMHG | OXYGEN SATURATION: 98 %

## 2024-05-23 LAB
ANION GAP SERPL CALC-SCNC: 9 MMOL/L (ref 3–18)
BASOPHILS # BLD: 0.1 K/UL (ref 0–0.1)
BASOPHILS NFR BLD: 1 % (ref 0–2)
BUN SERPL-MCNC: 56 MG/DL (ref 7–18)
BUN/CREAT SERPL: 16 (ref 12–20)
CALCIUM SERPL-MCNC: 8.9 MG/DL (ref 8.5–10.1)
CHLORIDE SERPL-SCNC: 103 MMOL/L (ref 100–111)
CO2 SERPL-SCNC: 25 MMOL/L (ref 21–32)
CREAT SERPL-MCNC: 3.41 MG/DL (ref 0.6–1.3)
DIFFERENTIAL METHOD BLD: ABNORMAL
EOSINOPHIL # BLD: 0.3 K/UL (ref 0–0.4)
EOSINOPHIL NFR BLD: 2 % (ref 0–5)
ERYTHROCYTE [DISTWIDTH] IN BLOOD BY AUTOMATED COUNT: 17.9 % (ref 11.6–14.5)
GLUCOSE SERPL-MCNC: 66 MG/DL (ref 74–99)
HCT VFR BLD AUTO: 30.2 % (ref 36–48)
HGB BLD-MCNC: 8.9 G/DL (ref 13–16)
IMM GRANULOCYTES # BLD AUTO: 0 K/UL (ref 0–0.04)
IMM GRANULOCYTES NFR BLD AUTO: 0 % (ref 0–0.5)
LYMPHOCYTES # BLD: 1.3 K/UL (ref 0.9–3.6)
LYMPHOCYTES NFR BLD: 12 % (ref 21–52)
MAGNESIUM SERPL-MCNC: 2.4 MG/DL (ref 1.6–2.6)
MCH RBC QN AUTO: 26.2 PG (ref 24–34)
MCHC RBC AUTO-ENTMCNC: 29.5 G/DL (ref 31–37)
MCV RBC AUTO: 88.8 FL (ref 78–100)
MONOCYTES # BLD: 1.1 K/UL (ref 0.05–1.2)
MONOCYTES NFR BLD: 10 % (ref 3–10)
NEUTS SEG # BLD: 7.9 K/UL (ref 1.8–8)
NEUTS SEG NFR BLD: 74 % (ref 40–73)
NRBC # BLD: 0 K/UL (ref 0–0.01)
NRBC BLD-RTO: 0 PER 100 WBC
PLATELET # BLD AUTO: 330 K/UL (ref 135–420)
PMV BLD AUTO: 11.1 FL (ref 9.2–11.8)
POTASSIUM SERPL-SCNC: 4.4 MMOL/L (ref 3.5–5.5)
RBC # BLD AUTO: 3.4 M/UL (ref 4.35–5.65)
SODIUM SERPL-SCNC: 137 MMOL/L (ref 136–145)
WBC # BLD AUTO: 10.6 K/UL (ref 4.6–13.2)

## 2024-05-23 PROCEDURE — G0299 HHS/HOSPICE OF RN EA 15 MIN: HCPCS

## 2024-05-23 PROCEDURE — 83735 ASSAY OF MAGNESIUM: CPT

## 2024-05-23 PROCEDURE — 85025 COMPLETE CBC W/AUTO DIFF WBC: CPT

## 2024-05-23 PROCEDURE — 80048 BASIC METABOLIC PNL TOTAL CA: CPT

## 2024-05-23 ASSESSMENT — ENCOUNTER SYMPTOMS: DYSPNEA ACTIVITY LEVEL: AFTER AMBULATING LESS THAN 10 FT

## 2024-05-23 NOTE — HOME HEALTH
Skilled reason for visit: LOLIS: CVA. Patient declined further SN visits, labs obtained, PT/OT pending. Patient scheduled to see PCP tomorrow.    Caregiver involvement: spouse assist with ADLs, meal prep, meds and transportation.    Medications reviewed and all medications are available in the home this visit.    The following education was provided regarding medications:   Amlodipine is used alone or in combination with other medications to treat high blood pressure and chest pain (angina). SN instructs the patient about the new medication Amlodipine / Benaz may cause dizziness, lightheadedness, or fainting, especially when you get up suddenly from a lying or sitting position, if you feel dizzy, lie down so you do not faint. Then sit for a few moments before standing to prevent the dizziness from returning. If you faint, call your doctor right away.  SN instructed patient / caregiver that Hydralazine medication relaxes and expands blood vessels and is used to treat high blood pressure ( hypertension ). SN explained to patient / caregiver that side effects of this medication includes: dizziness, drowsiness, headache, constipation, loss of appetite, fatigue and nasal congestion may occur as your body adjusts to the medication. SN explained to patient / caregiver that to avoid dizziness and lightheadedness when rising from a seated or lying position, get up slowly. SN instructed patient / caregiver to inform doctor if you develop: chest pain, muscle pain, swelling of the hands or feet, yellowing of the eyes / skin, joint pain, a change in the amount of urine. SN explained to patient / caregiver that this drug may cause numbness or tingling of the fingers and toes and that if this occurs, notify doctor. SN explained to patient / caregiver that symptoms of an allergic reaction include: rash, itching, swelling, dizziness, trouble breathing and should be reported immediately for prompt treatment.    Aspirin is in a group of

## 2024-05-28 ENCOUNTER — HOME CARE VISIT (OUTPATIENT)
Age: 69
End: 2024-05-28
Payer: MEDICARE

## 2024-05-28 VITALS
RESPIRATION RATE: 18 BRPM | TEMPERATURE: 98.3 F | HEART RATE: 79 BPM | SYSTOLIC BLOOD PRESSURE: 122 MMHG | DIASTOLIC BLOOD PRESSURE: 66 MMHG | OXYGEN SATURATION: 99 %

## 2024-05-28 PROCEDURE — G0151 HHCP-SERV OF PT,EA 15 MIN: HCPCS

## 2024-05-28 ASSESSMENT — ENCOUNTER SYMPTOMS: PAIN LOCATION - PAIN QUALITY: ACHY

## 2024-05-28 NOTE — HOME HEALTH
PT eval:  Pt is a 69 y/o male who was readmitted to hospital multiple times this past month due to CHF and chronic kidney disease/ESRD, and is now receiving hemodialysis. He also had CVA 2019 with R hemiplegia.  Pt was transitioned to in pt rehab on 5/9/24 and DC to home on  5/22/24.  He is referred to home care PT to continue with his physical rehab.  PMH:  Acute on chronic iron deficiency anemia;  HTN;  Ckd stage 3;   anasarca;  Type 2 diabetes  CVA;  A-fib; Depression;  CKD now ESRD;  Pericardial effusion without tamponade.  PLOF/living environment:  ind and ambulatory with NBQC within the home;  retired;  lives with wife in a one story house.    PREC: high fall risk;  R hemiplegia;  HD on MWF;  Requests PT on Tue/Thurs only due to HD.   S:  Pt. denies falls  and denies change in meds since LOLIS with nursing on 5/23/24.   Pt's goal in PT is  to walk independently again and get strronger.  O:  Pain:  RUE;  see pain assessment.  Integumentary:  Hemodialysis port on R upper chest; chronic dark discoloration on B forearms;  .  Pt education:  Maintenance of skin integrity:  change body position every 1-2 hours.     ROM:  BLE = WNL.    MMT:  RLE = 3+/5;  LLE = 4/5;  LUE = 4-5/5;  RUE MMT deferred due to synergistic movement.  Functional strength test:  FTSTS:  unable, indicating  decreased BLE functional strength.  Bed  mobility:  sit <> supine =  SBA  ;  rolling =  SBA  ;  Transfers:  sit <> stand from multiple surfaces  =  CGA, NBQC.  Balance:  Tinetti  = 12 / 28, indicating high fall risk;  TUG = 39  seconds with NBQC indicating  high fall risk;  Patient education:   Fall risk reduction strategies = use non skid shoes, use AD, ask for supervision.    Gait:  20 ft with CGA using NBQC exhibiting R hemiplegic gait pattern.  Endurance:  2MWT:  unable at this time, indicating poor walking endurance.  Physical activity:  Ambulate once every hour with supervision and NBQC, during daytime.    Patient level of understanding

## 2024-05-29 ENCOUNTER — HOME CARE VISIT (OUTPATIENT)
Age: 69
End: 2024-05-29
Payer: MEDICARE

## 2024-05-31 ENCOUNTER — HOME CARE VISIT (OUTPATIENT)
Age: 69
End: 2024-05-31
Payer: MEDICARE

## 2024-06-04 ENCOUNTER — HOME CARE VISIT (OUTPATIENT)
Age: 69
End: 2024-06-04
Payer: MEDICARE

## 2024-06-04 PROCEDURE — G0157 HHC PT ASSISTANT EA 15: HCPCS

## 2024-06-05 VITALS
DIASTOLIC BLOOD PRESSURE: 61 MMHG | OXYGEN SATURATION: 98 % | HEART RATE: 77 BPM | SYSTOLIC BLOOD PRESSURE: 116 MMHG | TEMPERATURE: 98.7 F

## 2024-06-05 NOTE — HOME HEALTH
Mother Becca SUBJECTIVE: Pt states he is doing ok trying to come to  with having to go to HD, no falls no, no changes in medications   CAREGIVER INVOLVEMENT/ASSISTANCE NEEDED FOR: Spouse assist pt as needed   OBJECTIVE: See interventions  PATIENT EDUCATION PROVIDED THIS VISIT: Pt educated on fall prevention strategies, energy conservation and effective breathing tech   PATIENT RESPONSE TO EDUCATION: Pt verbalizes understanding of education   PATIENT RESPONSE TO TREATMENT/ASSESSMENT OF PROGRESS TOWARD GOALS: History of CHF and chronic kidney disease/ESRD, and is now receiving hemodialysis.CVA 2019 with R hemiplegia. Established HEP consisiting COPD/CHF PROTOCOL Exercises 1-10 with modifications to RUE x 20 reps instruction for pursed lip breathing and to rest as needed, gait tng requies SBQC instruction to slow nell and decrease stride length to improve safety. Pt negotiates steps with single rail pt ascends and descends on involved side, pt instructed to perform HEP 2x day, walk every 1-2 hours with AD as tolerated. Skilled PT intervention needed to address deficits in strength, gait, transfers and balance to improve functional mobility and safety and lower risk for falls or injury.   PLAN FOR NEXT VISIT: Next visit will address balance, strength and gait  DISCHARGE PLANS: POC and Discharge plans discussed pt understands and agrees eventual DC once goals have been met or max HC benefits have been achieved. 3 visits remaining anticipated DC 6/13/2024

## 2024-06-06 ENCOUNTER — HOME CARE VISIT (OUTPATIENT)
Age: 69
End: 2024-06-06
Payer: MEDICARE

## 2024-06-06 VITALS
RESPIRATION RATE: 18 BRPM | HEART RATE: 79 BPM | OXYGEN SATURATION: 98 % | SYSTOLIC BLOOD PRESSURE: 128 MMHG | DIASTOLIC BLOOD PRESSURE: 67 MMHG | TEMPERATURE: 98.4 F

## 2024-06-06 VITALS
DIASTOLIC BLOOD PRESSURE: 87 MMHG | TEMPERATURE: 98 F | HEART RATE: 67 BPM | SYSTOLIC BLOOD PRESSURE: 127 MMHG | OXYGEN SATURATION: 98 %

## 2024-06-06 PROCEDURE — G0157 HHC PT ASSISTANT EA 15: HCPCS

## 2024-06-06 PROCEDURE — G0152 HHCP-SERV OF OT,EA 15 MIN: HCPCS

## 2024-06-06 NOTE — HOME HEALTH
Skilled Reason for admission/summary of clinical condition: 68 year old male with LOLIS orders s/p re-hospitalization     Caregiver Involvement: Pt lives with spouse and sister in law who are able to assist with ADLs, IADLs, and functional mobility     PMHx: CKD, iron deficiency anemia, obesity, obstructive sleep apnea. Hypertensive heart and kidney disease     PLOF: Pt as ambulating using SPC for household and community distances. Pt was independent with ADLs and functional mobility     Medications: Pt reports no changes to medications since last reviewed and educated to continue as directed per MD.     SUBJECTIVE: Pt reports no pain on this date. Pt wife present in the home throughout occupational therapy evaluation    DME ORDERED/RECOMMENEDED: grab bars, 3:1 commode, tub transfer bench     OBJECTIVE:   BATHING: Pt has tub shower with grab bars. Pt completes bathing with supervision.   TOILETING: Pt supervision for toileting on standard toilet. Pt with low toilet height and would benefit from 3:1 commode over toilet to increase height for pt increased safety   UB DRESSING: Pt supervision for upper body dressing to anna/doff shirts   LB DRESSING: Pt CGA for lower body dressing to anna/doff socks, shoes and pants   GROOMING: Pt supervision for grooming for hair care, oral care and washing hands/face while standing at the sink   FEEDING: Pt setup assistance for self feeding     Modified Alfredito RPE 7/10 after performing ambulation, transfers, and I/ADL assessment. Pt notably SOB while ambulating. Pt educated on proper breathing techniques while completing functional mobility     RUE MMT: 2+/5   RUE AROM: Pt shoulder flexion ~45 degrees unassisted     LUE MMT: 4/5   LUE AROM: WFL     VISION: Pt vision is WFL reading glasses for reading directions/medications and to navigate their home environment safely.     BALANCE: Pt with good sitting balance/tolerance. Pt with fair- standing balance/tolerance using small base quad

## 2024-06-06 NOTE — HOME HEALTH
SUBJECTIVE: No pain, no falls no changes in medications   CAREGIVER INVOLVEMENT/ASSISTANCE NEEDED FOR: Spouse assist pt as needed   OBJECTIVE: See interventions  PATIENT EDUCATION PROVIDED THIS VISIT: Pt educated on fall prevention strategies, energy conservation, effective breathing tech and O2 safety  PATIENT RESPONSE TO EDUCATION: Pt verbalizes understanding of education   PATIENT RESPONSE TO TREATMENT/ASSESSMENT OF PROGRESS TOWARD GOALS: History of CHF and chronic kidney disease/ESRD, and is now receiving hemodialysis.CVA 2019 with R hemiplegia. Pt has not use O2 since returning home from Hospital, pt able to complete COPD/CHF PROTOCOL Exercises 1-15 x 20 reps pt able to following handout with minimal assist, instruction for pursded lip breathing and to take rest periods an needed. demonstrates improved balance evident by improved Tinetti by 6 points, demonstrates improved safety with gait tng on even surfaces. Mr Shetty is progressing well toward established goals and is on target for anticipated DC Skilled PT intervention needed to address deficits in strength, gait, transfers and balance to improve functional mobility and safety and lower risk for falls or injury.   PLAN FOR NEXT VISIT: Next visit will address balance, strength and gait  DISCHARGE PLANS: POC and Discharge plans discussed pt understands and agrees eventual DC once goals have been met or max HC benefits have been achieved. 2 visits remaining anticipated DC 6/13/2024

## 2024-06-11 ENCOUNTER — TELEPHONE (OUTPATIENT)
Age: 69
End: 2024-06-11

## 2024-06-11 ENCOUNTER — HOME CARE VISIT (OUTPATIENT)
Age: 69
End: 2024-06-11
Payer: MEDICARE

## 2024-06-11 VITALS
OXYGEN SATURATION: 98 % | DIASTOLIC BLOOD PRESSURE: 66 MMHG | SYSTOLIC BLOOD PRESSURE: 126 MMHG | HEART RATE: 60 BPM | TEMPERATURE: 97.7 F

## 2024-06-11 PROCEDURE — G0157 HHC PT ASSISTANT EA 15: HCPCS

## 2024-06-11 NOTE — TELEPHONE ENCOUNTER
Received fax from [facility] regarding patients catheter for dialysis access. Stated that patient is unable to maintain venous and arterial pressure. Called the facility at 815am, but the facility is only open MWF. Called patient and confirmed that he was able to complete treatment but had issues. Offered to come today at 1030am for vein mapping but patient has an appointment at 11am. Keeping appointment for July 2 with Dr. Titus.     Patient scheduled tomorrow, June 12, 2024 (Wednesday) at 8am for catheter exchange with Dr. Titus.     Check in at [facility] at 6:30am.  Do not eat, drink or chew gum after midnight prior to surgery.   Only take heart and blood pressure medication with a sip of water the morning of the procedure.   Patient instructed to have RIDE available when discharged from hospital. Patient is not allowed to drive, walk or go home using public transportation (including Lyft and Uber).   Patient confirmed and repeated instructions.      Will fax [facility] to let them know that patient is scheduled for CVC exchange and will be late for dialysis treatment which starts at 10am, he will be there probably around 11am.     Document faxed and scanned.

## 2024-06-12 ENCOUNTER — HOSPITAL ENCOUNTER (OUTPATIENT)
Facility: HOSPITAL | Age: 69
Setting detail: OUTPATIENT SURGERY
Discharge: HOME | End: 2024-06-12
Attending: SURGERY | Admitting: SURGERY
Payer: MEDICARE

## 2024-06-12 VITALS
SYSTOLIC BLOOD PRESSURE: 157 MMHG | RESPIRATION RATE: 19 BRPM | TEMPERATURE: 97.8 F | BODY MASS INDEX: 27.4 KG/M2 | WEIGHT: 185 LBS | DIASTOLIC BLOOD PRESSURE: 65 MMHG | HEIGHT: 69 IN | OXYGEN SATURATION: 100 % | HEART RATE: 48 BPM

## 2024-06-12 DIAGNOSIS — Z78.9 PROBLEM WITH VASCULAR ACCESS: ICD-10-CM

## 2024-06-12 LAB
ABO + RH BLD: NORMAL
ANION GAP SERPL CALC-SCNC: 9 MMOL/L (ref 3–18)
BASOPHILS # BLD: 0.1 K/UL (ref 0–0.1)
BASOPHILS NFR BLD: 1 % (ref 0–2)
BLOOD GROUP ANTIBODIES SERPL: NORMAL
BUN SERPL-MCNC: 54 MG/DL (ref 7–18)
BUN/CREAT SERPL: 13 (ref 12–20)
CALCIUM SERPL-MCNC: 8.9 MG/DL (ref 8.5–10.1)
CHLORIDE SERPL-SCNC: 103 MMOL/L (ref 100–111)
CO2 SERPL-SCNC: 29 MMOL/L (ref 21–32)
CREAT SERPL-MCNC: 4.03 MG/DL (ref 0.6–1.3)
DIFFERENTIAL METHOD BLD: ABNORMAL
ECHO BSA: 2.02 M2
EOSINOPHIL # BLD: 0.2 K/UL (ref 0–0.4)
EOSINOPHIL NFR BLD: 3 % (ref 0–5)
ERYTHROCYTE [DISTWIDTH] IN BLOOD BY AUTOMATED COUNT: 16.1 % (ref 11.6–14.5)
GLUCOSE SERPL-MCNC: 96 MG/DL (ref 74–99)
HCT VFR BLD AUTO: 27.6 % (ref 36–48)
HGB BLD-MCNC: 8.8 G/DL (ref 13–16)
IMM GRANULOCYTES # BLD AUTO: 0.1 K/UL (ref 0–0.04)
IMM GRANULOCYTES NFR BLD AUTO: 1 % (ref 0–0.5)
LYMPHOCYTES # BLD: 1.2 K/UL (ref 0.9–3.6)
LYMPHOCYTES NFR BLD: 15 % (ref 21–52)
MCH RBC QN AUTO: 27.4 PG (ref 24–34)
MCHC RBC AUTO-ENTMCNC: 31.9 G/DL (ref 31–37)
MCV RBC AUTO: 86 FL (ref 78–100)
MONOCYTES # BLD: 0.8 K/UL (ref 0.05–1.2)
NEUTS SEG # BLD: 5.4 K/UL (ref 1.8–8)
NEUTS SEG NFR BLD: 70 % (ref 40–73)
NRBC # BLD: 0 K/UL (ref 0–0.01)
NRBC BLD-RTO: 0 PER 100 WBC
PLATELET # BLD AUTO: 167 K/UL (ref 135–420)
PMV BLD AUTO: 10.7 FL (ref 9.2–11.8)
POTASSIUM SERPL-SCNC: 3.3 MMOL/L (ref 3.5–5.5)
RBC # BLD AUTO: 3.21 M/UL (ref 4.35–5.65)
SODIUM SERPL-SCNC: 141 MMOL/L (ref 136–145)
SPECIMEN EXP DATE BLD: NORMAL
WBC # BLD AUTO: 7.8 K/UL (ref 4.6–13.2)

## 2024-06-12 PROCEDURE — 85025 COMPLETE CBC W/AUTO DIFF WBC: CPT

## 2024-06-12 PROCEDURE — 80048 BASIC METABOLIC PNL TOTAL CA: CPT

## 2024-06-12 PROCEDURE — 86900 BLOOD TYPING SEROLOGIC ABO: CPT

## 2024-06-12 PROCEDURE — 76000 FLUOROSCOPY <1 HR PHYS/QHP: CPT | Performed by: SURGERY

## 2024-06-12 PROCEDURE — C1750 CATH, HEMODIALYSIS,LONG-TERM: HCPCS | Performed by: SURGERY

## 2024-06-12 PROCEDURE — 36581 REPLACE TUNNELED CV CATH: CPT | Performed by: SURGERY

## 2024-06-12 PROCEDURE — 86850 RBC ANTIBODY SCREEN: CPT

## 2024-06-12 PROCEDURE — 2500000003 HC RX 250 WO HCPCS: Performed by: SURGERY

## 2024-06-12 PROCEDURE — 2709999900 HC NON-CHARGEABLE SUPPLY: Performed by: SURGERY

## 2024-06-12 PROCEDURE — 6360000002 HC RX W HCPCS: Performed by: SURGERY

## 2024-06-12 PROCEDURE — 86901 BLOOD TYPING SEROLOGIC RH(D): CPT

## 2024-06-12 PROCEDURE — 75827 VEIN X-RAY CHEST: CPT | Performed by: SURGERY

## 2024-06-12 PROCEDURE — 6360000004 HC RX CONTRAST MEDICATION: Performed by: SURGERY

## 2024-06-12 PROCEDURE — C1769 GUIDE WIRE: HCPCS | Performed by: SURGERY

## 2024-06-12 RX ORDER — MIDAZOLAM HYDROCHLORIDE 1 MG/ML
INJECTION INTRAMUSCULAR; INTRAVENOUS PRN
Status: DISCONTINUED | OUTPATIENT
Start: 2024-06-12 | End: 2024-06-12 | Stop reason: HOSPADM

## 2024-06-12 RX ORDER — CEFAZOLIN SODIUM 1 G/3ML
INJECTION, POWDER, FOR SOLUTION INTRAMUSCULAR; INTRAVENOUS PRN
Status: DISCONTINUED | OUTPATIENT
Start: 2024-06-12 | End: 2024-06-12 | Stop reason: HOSPADM

## 2024-06-12 RX ORDER — FENTANYL CITRATE 50 UG/ML
INJECTION, SOLUTION INTRAMUSCULAR; INTRAVENOUS PRN
Status: DISCONTINUED | OUTPATIENT
Start: 2024-06-12 | End: 2024-06-12 | Stop reason: HOSPADM

## 2024-06-12 NOTE — DISCHARGE INSTRUCTIONS
teaching were provided.  ___________________________________________________________________________________________________________________________________

## 2024-06-12 NOTE — H&P
Amish Shetty    Chief complaint: Malfunctioning tunneled dialysis catheter  History and Physical      The patient is a 69-year-old left-handed  male, with end-stage renal disease, on hemodialysis since last month, through tunneled hemodialysis catheter that was placed in the right internal jugular vein by interventional radiology on 5/2/2024 (-on Monday ,Wednesday and Friday).  He is accompanied by his wife today.  The flow in his catheter was stopping during treatments, on 6/10/2024-however he could complete his treatments.    The patient had stroke in 2019, with palsy of the right upper extremity.  He has a flexion contracture of the right elbow and the right wrist.  He can lift the right forearm however he cannot extend the right elbow.  He is left-handed.    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     On Allopurinol    Chronic hypokalemia     Persistent chronic hypokalemia + hypertension; ?primary hyperaldosteronism    CKD (chronic kidney disease)     Hypertension     Nephrotic syndrome     Obesity, Class I, BMI 30-34.9     Obstructive sleep apnea on CPAP     Pure hypercholesterolemia     Received intravenous tissue plasminogen activator (tPA) in emergency department 8/12/2019    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HCC)     ALECT2    Secondary hyperparathyroidism of renal origin (HCC) 8/18/2019    PTH (8/18/2019) = 101.7    Type 2 diabetes mellitus with stage 2 chronic kidney disease ([facility])     HbA1c (8/13/2019) = 6.6 no meds    Vitamin B12 deficiency anemia 8/14/2019    Vitamin B12 (8/14/2019) = 208    Vitamin D deficiency 8/19/2019    Vitamin D 25-Hydroxy (8/19/2019) = 16.2      Patient Active Problem List   Diagnosis    Secondary hyperparathyroidism of renal origin (HCC)

## 2024-06-12 NOTE — PROGRESS NOTES
Cath holding summary:    0700: Patient ambulated from waiting area without difficulty, placed on monitor. A&O x 4, no c/o pain. NPO since midnight, ID and allergies verified. H&P reviewed, med rec completed. PIV x 1 inserted, blood sent to lab. Groin prep completed, consent ready for signature.    0830: Verbal report given to Sherita GONGORA on [facility] being transferred to Cath Lab for ordered procedure. Report consisted of patient's Situation, Background, Assessment and Recommendations (SBAR). Information from the following report(s) MAR, Neuro Assessment, and Event Log was reviewed with the receiving nurse. Opportunity for questions and clarification was provided.    0924: Verbal report received from Romana on [facility] being received from Cath Lab for routine post-op. Report consisted of patient's Situation, Background, Assessment and Recommendations (SBAR). Information from the following report(s) Nurse Handoff Report, MAR, Neuro Assessment, and Event Log was reviewed with the receiving nurse. Pt A&O x 4, no c/o pain. Opportunity for questions and clarification provided.  Procedure: Tunneled Dialysis Catheter  Intervention: Yes  Site: Right, Chest      1000: AVS Discharge instructions reviewed with patient and copy given to patient.  All questions answered.  Patient verbalized understanding to all discharge instructions.  PIV removed. Procedural site within normal limits.  No hematoma or bleeding noted from procedural and PIV site. No pain noted at discharge. Patient back to neurological baseline, A&O x 4. Patient discharged in the presence of a responsible adult (Wife, Mary) who will accompany patient home and is able to report post procedure complications.

## 2024-06-12 NOTE — OP NOTE
Operative Note      Patient: Amish Shetty  YOB: 1955  MRN: [id number]    Date of Procedure: 6/12/2024    Pre-Op Diagnosis Codes:     * Problem with vascular access [Z78.9]  End-stage renal disease, on hemodialysis, malfunctioning right internal jugular vein tunneled hemodialysis catheter  Post-Op Diagnosis: Same       Procedure(s):  Tunnel dialysis catheter exchange  1.  Superior venacavogram through the catheter  2.  Exchange of right internal jugular vein tunneled hemodialysis catheter over wire,  to a 23 cm Palindrome catheter    Surgeon(s):  Adelaide Titus MD    Assistant:   * No surgical staff found *    Anesthesia: IV Sedation: Versed-0.5 mg, fentanyl-50 mcg, and local anesthesia  Sedation time: 17 minutes  Ancef: 2 g  Contrast: 5 mL    Estimated Blood Loss (mL): Minimal    Complications: None    Specimens:   * No specimens in log *    Implants:  * No implants in log *      Drains:   [REMOVED] External Urinary Catheter (Removed)       [REMOVED] External Urinary Catheter (Removed)       [REMOVED] External Urinary Catheter (Removed)       Findings:  Infection Present At Time Of Surgery (PATOS) (choose all levels that have infection present):  No infection present  Other Findings: Patent right Innominate vein and SVC    Detailed Description of Procedure:     The patient is a 69-year-old male, with end-stage renal disease, on hemodialysis through a right internal jugular vein tunneled hemodialysis catheter, that was placed by interventional  radiology on 5/2/2024.  He was having difficulty with the catheter during hemodialysis treatments-on 6/10/2024, with intermittent occlusion of one of the ports.  He was however able to complete his treatment.  He presents for catheter exchange.    Informed consent was obtained from the patient and his wife.    The patient was identified correctly and placed supine on the angiographic table.  The right side of the neck and the chest including the catheter were

## 2024-06-12 NOTE — HOME HEALTH
SUBJECTIVE: No pain, no falls no changes in medications   CAREGIVER INVOLVEMENT/ASSISTANCE NEEDED FOR: Spouse assist pt as needed   Assessment and Summary of Care:  Patient's current functional status before discharge is as follows  Strength: FTSTS 25sec   Bed Mobility: Ind from dining chair, commode, edge of bed  Transfers: Mod I with SBQC  Gait/WC mobility: 320ft with SBQC on uneven surfaces w/Supervision 2 standing rest periods due to fatigue  Stairs: Sup- Mod I with rails   Special Tests: TINETTI 18/28  Recommendations: Orders requested for outpatient PT/OT spoke with Susy at [facility] office requesting order be faxed to [facility] view

## 2024-06-12 NOTE — PRE SEDATION
Sedation Plan  ASA: class 4 - patient with severe systemic disease that is a constant threat to life     Mallampati class: III - soft palate, base of uvula visible.    Sedation plan: local anesthesia, minimal sedation and level 2-1: moderate/analgesia (conscious sedation)    Risks, benefits, and alternatives discussed with patient and spouse.  Use of blood products discussed with patient and spouse who consented to blood products.       Immediate reassessment prior to sedation:  Patient's status reviewed and vital signs assessed; acceptable to perform procedure and proceed to administer sedation as planned.

## 2024-06-13 ENCOUNTER — HOME CARE VISIT (OUTPATIENT)
Age: 69
End: 2024-06-13
Payer: MEDICARE

## 2024-06-13 VITALS
TEMPERATURE: 98.6 F | HEART RATE: 67 BPM | RESPIRATION RATE: 18 BRPM | DIASTOLIC BLOOD PRESSURE: 58 MMHG | SYSTOLIC BLOOD PRESSURE: 116 MMHG | OXYGEN SATURATION: 97 %

## 2024-06-13 PROCEDURE — G0151 HHCP-SERV OF PT,EA 15 MIN: HCPCS

## 2024-06-14 NOTE — HOME HEALTH
PT DC summary:  Pt is a 68 y/o male  with diagnosis of  ESRD on HD and Hx of CVA.    S:  Pt is doing well.  He wants to start with OPPT in July.  Pt denies falls nor change in medications.  O:   Medication list updated and reconciled.  PT home care eval date is 24.  Pt  received 2x/wk PT sessions for strengthening, bed mob and transfer trng, gait trng, HEP, balance trng, fall risk reduction strategies.    Pt's current status:  Integumentary:  intact.  Pain:  See pain assessment.  Pt is ind in self management of pain.  Goal met.  MMT BLE = 4 to 4+/5   FTSTS:  25 sec  = indicative of improved BLE functional strength.  Goal achieved.  HEP:  ind using  HEP handout and pt has been doing exercises daily.  Goal met.  Patient DC instructions:  Continue doing HEP and ambulate once every hour during daytime.  Bed mobility:  sit <> supine = ind.  Goal met.  Transfers:  sit <> stand from multiple surfaces including shower transfers = ind with QC;  Car transfers = SBA with QC.  Goal met.  Gait:  320 ft with ind using NBQC exhibiting slow nell, hemiplegic gait pattern.  SBA gait on outdoor terrain with NBQC.   Goal met.  6 MWT:  150  ft, NBQC = indicative of improved walking endurance.  Goal met.  side entry Stairs negotiation for ingress/egress to home:  SBA holding on to railing and SC  with correct foot sequencing = met.  TU.2 sec with NBQC = indicative of balance improvement but continues to be a fall risk.  Goal met.  Tinetti:     = indicative of balance improvement but still a fall risk.  Goal met.  Fall risk reduction strategies reviewed and pt verbalized compliance  Patient level of understanding of education provided: verbalized.  Patient response to treatment:  tolerated well  Caregiver involvement/assistance needed: wife (name of caregiver) assists with self care, ADLs, iADLs, functional mobility, transportation.  A:  Patient demonstrated functional gains in  therapy this date evidenced by improved

## 2024-07-02 ENCOUNTER — OFFICE VISIT (OUTPATIENT)
Age: 69
End: 2024-07-02
Payer: MEDICARE

## 2024-07-02 VITALS
BODY MASS INDEX: 27.11 KG/M2 | OXYGEN SATURATION: 98 % | WEIGHT: 183 LBS | DIASTOLIC BLOOD PRESSURE: 60 MMHG | HEART RATE: 62 BPM | SYSTOLIC BLOOD PRESSURE: 120 MMHG | HEIGHT: 69 IN

## 2024-07-02 DIAGNOSIS — I69.951 HEMIPARESIS OF RIGHT DOMINANT SIDE AS LATE EFFECT OF CEREBROVASCULAR DISEASE, UNSPECIFIED CEREBROVASCULAR DISEASE TYPE (HCC): ICD-10-CM

## 2024-07-02 DIAGNOSIS — N18.6 ESRD (END STAGE RENAL DISEASE) (HCC): Primary | ICD-10-CM

## 2024-07-02 PROCEDURE — 3074F SYST BP LT 130 MM HG: CPT | Performed by: SURGERY

## 2024-07-02 PROCEDURE — 3078F DIAST BP <80 MM HG: CPT | Performed by: SURGERY

## 2024-07-02 PROCEDURE — G8428 CUR MEDS NOT DOCUMENT: HCPCS | Performed by: SURGERY

## 2024-07-02 PROCEDURE — G8417 CALC BMI ABV UP PARAM F/U: HCPCS | Performed by: SURGERY

## 2024-07-02 PROCEDURE — 3017F COLORECTAL CA SCREEN DOC REV: CPT | Performed by: SURGERY

## 2024-07-02 PROCEDURE — 1123F ACP DISCUSS/DSCN MKR DOCD: CPT | Performed by: SURGERY

## 2024-07-02 PROCEDURE — 99204 OFFICE O/P NEW MOD 45 MIN: CPT | Performed by: SURGERY

## 2024-07-02 PROCEDURE — 1036F TOBACCO NON-USER: CPT | Performed by: SURGERY

## 2024-07-02 NOTE — PROGRESS NOTES
[facility]    Vascular Surgery Office Visit    Amish Shetty  Chief Complaint   Patient presents with    Vascular Access Problem     Referred by Dr. Bray       History:  69 y.o. male presents for eval for AV access. He recently started HD via a [facility] TDC. He is accompanied today by his wife. He has a hx of a left hemispheric CVA with RUE hemiparesis in 2019. He continues to perform aggressive PT of the RUE to include e stim. He would like to have the AV access placed on his RUE if possible. He has shoulder and some elbow function of the RUE. His hand is mostly paretic. No prior RUE operations. He uses his LUE for all ADLs.       Physical Exam:    /60   Pulse 62   Ht 1.753 m (5' 9\")   Wt 83 kg (183 lb)   SpO2 98%   BMI 27.02 kg/m²      Constitutional:  Patient is well developed, well nourished, and not distressed.   HEENT: Atraumatic, normocephalic. No carotid bruits appreciated.  Eyes:   Cunjunctivae clear, no scleral icterus  Cardiovascular:  Normal rate, regular rhythm, normal heart sounds.  Pulmonary/Chest: Effort normal and breath sounds normal.  he has no wheezes or no rales.   Abdominal:   Soft, non-distended. No tenderness to palpation.   Extremities: BUE warm. Radial pulses palpable bilaterally. There R hand digits are contracted. No edema. There is chronic skin thickening and discoloration of both arms. The LUE is warm without edema.   Neurological:  he  is alert and oriented x3.   Psych: Appropriate mood and affect.     Imaging/Studies:   Vein mapping: adequate cephalic veins of the forearm and upper arm bilaterally.       Impression:  ESRD undergoing HD via a TDC  Hx of L hemispheric stroke with R sided paresis. Veins of the RUE are adequate and there is minimal atrophy of the RUE.     Plan:  RUE radial or brachial cephalic AVF. The pt and his wife would like to pursue a RUE AV access. We discussed hemiparesis and the possibility that his RUE veins might be

## 2024-07-15 ENCOUNTER — TELEPHONE (OUTPATIENT)
Age: 69
End: 2024-07-15

## 2024-07-15 ENCOUNTER — PREP FOR PROCEDURE (OUTPATIENT)
Age: 69
End: 2024-07-15

## 2024-07-15 DIAGNOSIS — N18.6 END STAGE RENAL DISEASE (HCC): ICD-10-CM

## 2024-07-15 NOTE — TELEPHONE ENCOUNTER
Called and left message at 950am on 7/15/2024 to schedule patient for surgery on August 2, 2024 (Friday) with Dr. Craig for Right Upper Extremity Arteriouvenous Fistula Creation. Patient goes to [facility] on MWF, Called facility to check if they can have patient get dialysis treatment on August 1 (Thursday), they said they patient will need to go to [facility]. Called [facility] and spoke to Magnolia and stated that they are able to get him in on August 1. [facility] will need to coordinate with the other facility and with the patient. Sent fax for instructions.     Waiting for patient to call back to give details for surgery.

## 2024-07-15 NOTE — TELEPHONE ENCOUNTER
Patient called back at 257pm to confirm surgery for August 2, 2024 (Friday) with Dr. Craig for Right Upper Extremity Arteriovenous Fistula Creation. Instructed patient that he will go to dialysis on August 1, 2024 (Thursday) and the facility will give him instructions for the time. Will mail out surgery instructions and patient will call back to confirm he received instructions.

## 2024-07-18 ENCOUNTER — TELEPHONE (OUTPATIENT)
Age: 69
End: 2024-07-18

## 2024-07-18 NOTE — TELEPHONE ENCOUNTER
Magnolia from [facility] stated that patient did not want to go to dialysis at [facility] the day before surgery. He is scheduled on August 2nd for Right Upper Extremity Arteriovenous Fistula Creation with Dr. Craig. They wanted to know if he can go to dialysis the same day of surgery. They can get him in at 530am and maybe get 2-3 hours of treatment before he checks in at [facility] at 1030am. Informed her that we will talk to the provider and will call back on Monday.    Patient called and wanted to confirm if this will be ok. Informed patient will call him back on Monday. Patient understood.

## 2024-07-19 ENCOUNTER — TELEPHONE (OUTPATIENT)
Age: 69
End: 2024-07-19

## 2024-07-19 NOTE — TELEPHONE ENCOUNTER
Called and left message for patient to call back the office regarding upcoming surgery with Dr. Craig for August 2, 2024 (Friday) for Right Upper Extremity Arteriovenous Fistula Creation. Will need to move this surgery to his non-dialysis day. Unable to have AM treatment and have surgery at 12noon the same day per Anesthesia. Waiting for return call.

## 2024-07-22 ENCOUNTER — ANESTHESIA EVENT (OUTPATIENT)
Dept: CARDIOTHORACIC SURGERY | Facility: HOSPITAL | Age: 69
End: 2024-07-22
Payer: MEDICARE

## 2024-07-22 ENCOUNTER — TELEPHONE (OUTPATIENT)
Age: 69
End: 2024-07-22

## 2024-07-22 RX ORDER — ISOSORBIDE MONONITRATE 30 MG/1
30 TABLET, EXTENDED RELEASE ORAL DAILY
COMMUNITY
Start: 2024-07-05

## 2024-07-22 RX ORDER — LOSARTAN POTASSIUM 25 MG/1
25 TABLET ORAL DAILY
COMMUNITY
Start: 2024-07-09

## 2024-07-22 NOTE — PERIOP NOTE
Instructions for your surgery at [facility]      Today's Date:  7/22/2024      Patient's Name:  Amish Shetty           Surgery Date:  7/23/2024              Please enter the main entrance of the hospital and check-in at the front security desk located in the lobby. They will direct you to the area to report for your surgery.     Do NOT eat or drink anything, including candy, gum, or ice chips after midnight prior to your surgery, unless you have specific instructions from your surgeon or anesthesia provider to do so.  Brush your teeth before coming to the hospital. You may swish with water, but do not swallow.  No smoking/Vaping/E-Cigarettes 24 hours prior to the day of surgery.  No alcohol 24 hours prior to the day of surgery.  No recreational drugs for one week prior to the day of surgery.  Bring Photo ID, Insurance information, and Co-pay if required on day of surgery.  Bring in pertinent legal documents, such as, Medical Power of , DNR, Advance Directive, etc.  Leave all valuables, including money/purse, at home.  Remove all jewelry, including ALL body piercings, nail polish, acrylic nails, and makeup (including mascara); no lotions, powders, deodorant, or perfume/cologne/after shave on the skin.  Follow instruction for Hibiclens washes and CHG wipes from surgeon's office.   Glasses and dentures may be worn to the hospital. They must be removed prior to surgery. Please bring case/container for glasses or dentures.   Contact lenses should not be worn on day of surgery.   Call your doctor's office if symptoms of a cold or illness develop within 24-48 hours prior to your surgery.  Call your doctor's office if you have any questions concerning insurance or co-pays.  15. AN ADULT (relative or friend 18 years or older) MUST DRIVE YOU HOME AFTER YOUR SURGERY.  16. Please make arrangements for a responsible adult (18 years or older) to be with you for 24 hours after your surgery.   17.

## 2024-07-22 NOTE — TELEPHONE ENCOUNTER
Patient called and stated that Dr. Craig called him over the weekend. Patient called back at 930am and offered to move his surgery that was scheduled on August 13 for Right Upper Extremity Arteriovenous Fistula Creation with on July 23, 2024 (Tuesday) at [facility]. Patient confirmed new date and time.     Patient instructions:   Check in at [facility] Main entrance at 5:30am.  Do not eat, drink or chew gum after midnight prior to surgery.   Only take heart and blood pressure medication with a sip of water the morning of the procedure.   Patient instructed to have RIDE available when discharged from hospital. Patient is not allowed to drive, walk or go home using public transportation (including [employer] and [employer]).   Patient given hospital discharge appointment on August 14, 2024 and 3:30pm with Dr. Craig.   Patient confirmed and repeated instructions.

## 2024-07-23 ENCOUNTER — HOSPITAL ENCOUNTER (OUTPATIENT)
Facility: HOSPITAL | Age: 69
Setting detail: OUTPATIENT SURGERY
Discharge: HOME | End: 2024-07-23
Attending: SURGERY | Admitting: SURGERY
Payer: MEDICARE

## 2024-07-23 ENCOUNTER — ANESTHESIA (OUTPATIENT)
Dept: CARDIOTHORACIC SURGERY | Facility: HOSPITAL | Age: 69
End: 2024-07-23
Payer: MEDICARE

## 2024-07-23 VITALS
OXYGEN SATURATION: 100 % | WEIGHT: 178.4 LBS | BODY MASS INDEX: 26.42 KG/M2 | HEIGHT: 69 IN | RESPIRATION RATE: 20 BRPM | DIASTOLIC BLOOD PRESSURE: 62 MMHG | TEMPERATURE: 99 F | SYSTOLIC BLOOD PRESSURE: 109 MMHG | HEART RATE: 60 BPM

## 2024-07-23 DIAGNOSIS — N18.6 END STAGE RENAL DISEASE (HCC): Primary | ICD-10-CM

## 2024-07-23 LAB
ANION GAP SERPL CALC-SCNC: 11 MMOL/L (ref 3–18)
BUN SERPL-MCNC: 24 MG/DL (ref 7–18)
BUN/CREAT SERPL: 5 (ref 12–20)
CALCIUM SERPL-MCNC: 9.3 MG/DL (ref 8.5–10.1)
CHLORIDE SERPL-SCNC: 103 MMOL/L (ref 100–111)
CO2 SERPL-SCNC: 22 MMOL/L (ref 21–32)
CREAT SERPL-MCNC: 4.51 MG/DL (ref 0.6–1.3)
GLUCOSE BLD STRIP.AUTO-MCNC: 94 MG/DL (ref 70–110)
GLUCOSE SERPL-MCNC: 90 MG/DL (ref 74–99)
POTASSIUM SERPL-SCNC: 3.4 MMOL/L (ref 3.5–5.5)
SODIUM SERPL-SCNC: 136 MMOL/L (ref 136–145)

## 2024-07-23 PROCEDURE — 6370000000 HC RX 637 (ALT 250 FOR IP): Performed by: NURSE ANESTHETIST, CERTIFIED REGISTERED

## 2024-07-23 PROCEDURE — 2580000003 HC RX 258: Performed by: ANESTHESIOLOGY

## 2024-07-23 PROCEDURE — 2500000003 HC RX 250 WO HCPCS: Performed by: ANESTHESIOLOGY

## 2024-07-23 PROCEDURE — 3700000001 HC ADD 15 MINUTES (ANESTHESIA): Performed by: SURGERY

## 2024-07-23 PROCEDURE — 3700000000 HC ANESTHESIA ATTENDED CARE: Performed by: SURGERY

## 2024-07-23 PROCEDURE — 7100000000 HC PACU RECOVERY - FIRST 15 MIN: Performed by: SURGERY

## 2024-07-23 PROCEDURE — 3600000012 HC SURGERY LEVEL 2 ADDTL 15MIN: Performed by: SURGERY

## 2024-07-23 PROCEDURE — 2580000003 HC RX 258: Performed by: SURGERY

## 2024-07-23 PROCEDURE — 82962 GLUCOSE BLOOD TEST: CPT

## 2024-07-23 PROCEDURE — 6360000002 HC RX W HCPCS: Performed by: ANESTHESIOLOGY

## 2024-07-23 PROCEDURE — 7100000001 HC PACU RECOVERY - ADDTL 15 MIN: Performed by: SURGERY

## 2024-07-23 PROCEDURE — 2709999900 HC NON-CHARGEABLE SUPPLY: Performed by: SURGERY

## 2024-07-23 PROCEDURE — 2720000010 HC SURG SUPPLY STERILE: Performed by: SURGERY

## 2024-07-23 PROCEDURE — 2580000003 HC RX 258: Performed by: NURSE ANESTHETIST, CERTIFIED REGISTERED

## 2024-07-23 PROCEDURE — 2700000000 HC OXYGEN THERAPY PER DAY

## 2024-07-23 PROCEDURE — 7100000010 HC PHASE II RECOVERY - FIRST 15 MIN: Performed by: SURGERY

## 2024-07-23 PROCEDURE — 3600000002 HC SURGERY LEVEL 2 BASE: Performed by: SURGERY

## 2024-07-23 PROCEDURE — 80048 BASIC METABOLIC PNL TOTAL CA: CPT

## 2024-07-23 PROCEDURE — 7100000011 HC PHASE II RECOVERY - ADDTL 15 MIN: Performed by: SURGERY

## 2024-07-23 PROCEDURE — A4217 STERILE WATER/SALINE, 500 ML: HCPCS | Performed by: SURGERY

## 2024-07-23 PROCEDURE — 2500000003 HC RX 250 WO HCPCS: Performed by: SURGERY

## 2024-07-23 PROCEDURE — 94761 N-INVAS EAR/PLS OXIMETRY MLT: CPT

## 2024-07-23 PROCEDURE — 36821 AV FUSION DIRECT ANY SITE: CPT | Performed by: SURGERY

## 2024-07-23 PROCEDURE — 6360000002 HC RX W HCPCS: Performed by: SURGERY

## 2024-07-23 RX ORDER — NALOXONE HYDROCHLORIDE 0.4 MG/ML
INJECTION, SOLUTION INTRAMUSCULAR; INTRAVENOUS; SUBCUTANEOUS PRN
Status: CANCELLED | OUTPATIENT
Start: 2024-07-23

## 2024-07-23 RX ORDER — FAMOTIDINE 20 MG/1
20 TABLET, FILM COATED ORAL ONCE
Status: COMPLETED | OUTPATIENT
Start: 2024-07-23 | End: 2024-07-23

## 2024-07-23 RX ORDER — HEPARIN SODIUM 200 [USP'U]/100ML
INJECTION, SOLUTION INTRAVENOUS
Status: DISCONTINUED
Start: 2024-07-23 | End: 2024-07-23 | Stop reason: HOSPADM

## 2024-07-23 RX ORDER — SODIUM CHLORIDE 0.9 % (FLUSH) 0.9 %
5-40 SYRINGE (ML) INJECTION PRN
Status: DISCONTINUED | OUTPATIENT
Start: 2024-07-23 | End: 2024-07-23 | Stop reason: HOSPADM

## 2024-07-23 RX ORDER — IPRATROPIUM BROMIDE AND ALBUTEROL SULFATE 2.5; .5 MG/3ML; MG/3ML
1 SOLUTION RESPIRATORY (INHALATION)
Status: CANCELLED | OUTPATIENT
Start: 2024-07-23 | End: 2024-07-24

## 2024-07-23 RX ORDER — HYDRALAZINE HYDROCHLORIDE 20 MG/ML
10 INJECTION INTRAMUSCULAR; INTRAVENOUS
Status: CANCELLED | OUTPATIENT
Start: 2024-07-23

## 2024-07-23 RX ORDER — LIDOCAINE HYDROCHLORIDE 10 MG/ML
INJECTION, SOLUTION EPIDURAL; INFILTRATION; INTRACAUDAL; PERINEURAL PRN
Status: DISCONTINUED | OUTPATIENT
Start: 2024-07-23 | End: 2024-07-23 | Stop reason: ALTCHOICE

## 2024-07-23 RX ORDER — SODIUM CHLORIDE 9 MG/ML
INJECTION, SOLUTION INTRAVENOUS PRN
Status: DISCONTINUED | OUTPATIENT
Start: 2024-07-23 | End: 2024-07-23 | Stop reason: HOSPADM

## 2024-07-23 RX ORDER — BUPIVACAINE HYDROCHLORIDE AND EPINEPHRINE 5; 5 MG/ML; UG/ML
INJECTION, SOLUTION EPIDURAL; INTRACAUDAL; PERINEURAL
Status: DISCONTINUED
Start: 2024-07-23 | End: 2024-07-23 | Stop reason: WASHOUT

## 2024-07-23 RX ORDER — DROPERIDOL 2.5 MG/ML
0.62 INJECTION, SOLUTION INTRAMUSCULAR; INTRAVENOUS
Status: CANCELLED | OUTPATIENT
Start: 2024-07-23 | End: 2024-07-24

## 2024-07-23 RX ORDER — SODIUM CHLORIDE 9 MG/ML
INJECTION, SOLUTION INTRAVENOUS CONTINUOUS PRN
Status: DISCONTINUED | OUTPATIENT
Start: 2024-07-23 | End: 2024-07-23 | Stop reason: SDUPTHER

## 2024-07-23 RX ORDER — SODIUM CHLORIDE 9 MG/ML
INJECTION, SOLUTION INTRAVENOUS CONTINUOUS
Status: CANCELLED | OUTPATIENT
Start: 2024-07-23

## 2024-07-23 RX ORDER — BUPIVACAINE HYDROCHLORIDE 2.5 MG/ML
INJECTION, SOLUTION EPIDURAL; INFILTRATION; INTRACAUDAL
Status: DISCONTINUED
Start: 2024-07-23 | End: 2024-07-23 | Stop reason: HOSPADM

## 2024-07-23 RX ORDER — CEFAZOLIN SODIUM 1 G/3ML
INJECTION, POWDER, FOR SOLUTION INTRAMUSCULAR; INTRAVENOUS PRN
Status: DISCONTINUED | OUTPATIENT
Start: 2024-07-23 | End: 2024-07-23 | Stop reason: SDUPTHER

## 2024-07-23 RX ORDER — HEPARIN SODIUM 200 [USP'U]/100ML
INJECTION, SOLUTION INTRAVENOUS CONTINUOUS PRN
Status: COMPLETED | OUTPATIENT
Start: 2024-07-23 | End: 2024-07-23

## 2024-07-23 RX ORDER — ACETAMINOPHEN 160 MG/5ML
650 LIQUID ORAL
Status: CANCELLED | OUTPATIENT
Start: 2024-07-23 | End: 2024-07-24

## 2024-07-23 RX ORDER — FENTANYL CITRATE 50 UG/ML
INJECTION, SOLUTION INTRAMUSCULAR; INTRAVENOUS PRN
Status: DISCONTINUED | OUTPATIENT
Start: 2024-07-23 | End: 2024-07-23 | Stop reason: SDUPTHER

## 2024-07-23 RX ORDER — FENTANYL CITRATE 50 UG/ML
25 INJECTION, SOLUTION INTRAMUSCULAR; INTRAVENOUS EVERY 5 MIN PRN
Status: CANCELLED | OUTPATIENT
Start: 2024-07-23

## 2024-07-23 RX ORDER — OXYCODONE HYDROCHLORIDE 5 MG/1
5 TABLET ORAL EVERY 6 HOURS PRN
Qty: 6 TABLET | Refills: 0 | Status: SHIPPED | OUTPATIENT
Start: 2024-07-23 | End: 2024-07-26

## 2024-07-23 RX ORDER — SODIUM CHLORIDE 0.9 % (FLUSH) 0.9 %
5-40 SYRINGE (ML) INJECTION EVERY 12 HOURS SCHEDULED
Status: CANCELLED | OUTPATIENT
Start: 2024-07-23

## 2024-07-23 RX ORDER — ONDANSETRON 2 MG/ML
4 INJECTION INTRAMUSCULAR; INTRAVENOUS
Status: CANCELLED | OUTPATIENT
Start: 2024-07-23 | End: 2024-07-24

## 2024-07-23 RX ORDER — INSULIN LISPRO 100 [IU]/ML
0-12 INJECTION, SOLUTION INTRAVENOUS; SUBCUTANEOUS ONCE
Status: DISCONTINUED | OUTPATIENT
Start: 2024-07-23 | End: 2024-07-23 | Stop reason: HOSPADM

## 2024-07-23 RX ORDER — DIPHENHYDRAMINE HYDROCHLORIDE 50 MG/ML
12.5 INJECTION INTRAMUSCULAR; INTRAVENOUS
Status: CANCELLED | OUTPATIENT
Start: 2024-07-23 | End: 2024-07-24

## 2024-07-23 RX ORDER — DEXTROSE MONOHYDRATE 100 MG/ML
INJECTION, SOLUTION INTRAVENOUS CONTINUOUS PRN
Status: DISCONTINUED | OUTPATIENT
Start: 2024-07-23 | End: 2024-07-23 | Stop reason: HOSPADM

## 2024-07-23 RX ORDER — SODIUM CHLORIDE 9 MG/ML
INJECTION, SOLUTION INTRAVENOUS CONTINUOUS
Status: DISCONTINUED | OUTPATIENT
Start: 2024-07-23 | End: 2024-07-23 | Stop reason: HOSPADM

## 2024-07-23 RX ORDER — MAGNESIUM HYDROXIDE 1200 MG/15ML
LIQUID ORAL CONTINUOUS PRN
Status: COMPLETED | OUTPATIENT
Start: 2024-07-23 | End: 2024-07-23

## 2024-07-23 RX ORDER — DEXTROSE MONOHYDRATE 100 MG/ML
INJECTION, SOLUTION INTRAVENOUS CONTINUOUS PRN
Status: CANCELLED | OUTPATIENT
Start: 2024-07-23

## 2024-07-23 RX ORDER — PROTAMINE SULFATE 10 MG/ML
INJECTION, SOLUTION INTRAVENOUS PRN
Status: DISCONTINUED | OUTPATIENT
Start: 2024-07-23 | End: 2024-07-23 | Stop reason: SDUPTHER

## 2024-07-23 RX ORDER — PROPOFOL 10 MG/ML
INJECTION, EMULSION INTRAVENOUS PRN
Status: DISCONTINUED | OUTPATIENT
Start: 2024-07-23 | End: 2024-07-23 | Stop reason: SDUPTHER

## 2024-07-23 RX ORDER — LIDOCAINE HYDROCHLORIDE 20 MG/ML
INJECTION, SOLUTION EPIDURAL; INFILTRATION; INTRACAUDAL; PERINEURAL PRN
Status: DISCONTINUED | OUTPATIENT
Start: 2024-07-23 | End: 2024-07-23 | Stop reason: SDUPTHER

## 2024-07-23 RX ORDER — SODIUM CHLORIDE 0.9 % (FLUSH) 0.9 %
5-40 SYRINGE (ML) INJECTION EVERY 12 HOURS SCHEDULED
Status: DISCONTINUED | OUTPATIENT
Start: 2024-07-23 | End: 2024-07-23 | Stop reason: HOSPADM

## 2024-07-23 RX ORDER — FENTANYL CITRATE 50 UG/ML
50 INJECTION, SOLUTION INTRAMUSCULAR; INTRAVENOUS EVERY 5 MIN PRN
Status: CANCELLED | OUTPATIENT
Start: 2024-07-23

## 2024-07-23 RX ORDER — HEPARIN SODIUM 1000 [USP'U]/ML
INJECTION, SOLUTION INTRAVENOUS; SUBCUTANEOUS PRN
Status: DISCONTINUED | OUTPATIENT
Start: 2024-07-23 | End: 2024-07-23 | Stop reason: SDUPTHER

## 2024-07-23 RX ADMIN — FENTANYL CITRATE 50 MCG: 50 INJECTION INTRAMUSCULAR; INTRAVENOUS at 07:55

## 2024-07-23 RX ADMIN — HEPARIN SODIUM 3000 UNITS: 1000 INJECTION, SOLUTION INTRAVENOUS; SUBCUTANEOUS at 08:22

## 2024-07-23 RX ADMIN — SODIUM CHLORIDE: 9 INJECTION, SOLUTION INTRAVENOUS at 07:34

## 2024-07-23 RX ADMIN — LIDOCAINE HYDROCHLORIDE 100 MG: 20 INJECTION, SOLUTION EPIDURAL; INFILTRATION; INTRACAUDAL; PERINEURAL at 07:38

## 2024-07-23 RX ADMIN — PROPOFOL 50 MG: 10 INJECTION, EMULSION INTRAVENOUS at 07:40

## 2024-07-23 RX ADMIN — FAMOTIDINE 20 MG: 20 TABLET ORAL at 07:04

## 2024-07-23 RX ADMIN — PROTAMINE SULFATE 20 MG: 10 INJECTION, SOLUTION INTRAVENOUS at 08:48

## 2024-07-23 RX ADMIN — PROPOFOL 50 MG: 10 INJECTION, EMULSION INTRAVENOUS at 07:39

## 2024-07-23 RX ADMIN — PROPOFOL 100 MG: 10 INJECTION, EMULSION INTRAVENOUS at 07:38

## 2024-07-23 RX ADMIN — SODIUM CHLORIDE: 9 INJECTION, SOLUTION INTRAVENOUS at 07:01

## 2024-07-23 RX ADMIN — CEFAZOLIN 2 G: 330 INJECTION, POWDER, FOR SOLUTION INTRAMUSCULAR; INTRAVENOUS at 07:55

## 2024-07-23 RX ADMIN — FENTANYL CITRATE 50 MCG: 50 INJECTION INTRAMUSCULAR; INTRAVENOUS at 08:08

## 2024-07-23 RX ADMIN — FENTANYL CITRATE 100 MCG: 50 INJECTION INTRAMUSCULAR; INTRAVENOUS at 09:00

## 2024-07-23 ASSESSMENT — PAIN - FUNCTIONAL ASSESSMENT
PAIN_FUNCTIONAL_ASSESSMENT: NONE - DENIES PAIN
PAIN_FUNCTIONAL_ASSESSMENT: 0-10

## 2024-07-23 ASSESSMENT — PAIN SCALES - GENERAL
PAINLEVEL_OUTOF10: 0
PAINLEVEL_OUTOF10: 0

## 2024-07-23 NOTE — DISCHARGE INSTRUCTIONS
[facility]    Procedure(s) Performed: RIGHT ARM RADIO-CEPHALIC ARTERIOVENOUS FISTULA PLACEMENT    Surgeon: Dr. Imani Craig MD    Discharge Instructions:    You may shower as needed. Allow the soap and water to run over your incision(s) and gently pat dry. No tub bathing for 6-8 weeks.   Do not apply cream or ointment to the incisions. Keep a dry gauze over the incision(s) if there is mild drainage.   If the incisions become red or have worsening drainage, call the vascular clinic or seek emergency care immediately.   Take oxycodone and tylenol as needed for pain.   Follow up in vascular surgery clinic with Dr. Craig as scheduled.   Call our clinic and/or seek emergency care if you develop a fever, chills, shortness of breath, severe pain, wound drainage, wound redness, or any other concerns.   No IV access or blood pressure measurement of the right upper extremity.   No e stim or PT of the right upper extremity  No lifting > 10 lbs for 6- 8 weeks with the right upper extremity.     Vascular Clinic Phone Number: [phone]         DISCHARGE SUMMARY from Nurse    PATIENT INSTRUCTIONS:    After general anesthesia or intravenous sedation, for 24 hours or while taking prescription Narcotics:  Limit your activities  Do not drive and operate hazardous machinery  Do not make important personal or business decisions  Do  not drink alcoholic beverages  If you have not urinated within 8 hours after discharge, please contact your surgeon on call.    Report the following to your surgeon:  Excessive pain, swelling, redness or odor of or around the surgical area  Temperature over 100.5  Nausea and vomiting lasting longer than 4 hours or if unable to take medications  Any signs of decreased circulation or nerve impairment to extremity: change in color, persistent  numbness, tingling, coldness or increase pain  Any questions      These are general instructions for a healthy  lifestyle:    No smoking/ No tobacco products/ Avoid exposure to second hand smoke  Surgeon General's Warning:  Quitting smoking now greatly reduces serious risk to your health.    Obesity, smoking, and sedentary lifestyle greatly increases your risk for illness    A healthy diet, regular physical exercise & weight monitoring are important for maintaining a healthy lifestyle    You may be retaining fluid if you have a history of heart failure or if you experience any of the following symptoms:  Weight gain of 3 pounds or more overnight or 5 pounds in a week, increased swelling in our hands or feet or shortness of breath while lying flat in bed.  Please call your doctor as soon as you notice any of these symptoms; do not wait until your next office visit.        The discharge information has been reviewed with the patient.  The patient verbalized understanding.  Discharge medications reviewed with the patient and appropriate educational materials and side effects teaching were provided.  ___________________________________________________________________________________________________________________________________

## 2024-07-23 NOTE — ANESTHESIA POSTPROCEDURE EVALUATION
Department of Anesthesiology  Postprocedure Note    Patient: Amish Shetty  MRN: [id number]  YOB: 1955  Date of evaluation: 7/23/2024    Procedure Summary       Date: 07/23/24 Room / Location: [facility] 02 / [facility] CARDIAC SURGERY    Anesthesia Start: 0732 Anesthesia Stop: 0930    Procedure: RIGHT UPPER EXTREMITY ARTERIOVENOUS FISTULA CREATION RADIOCEPHALIC (Right) Diagnosis:       End stage renal disease (HCC)      (End stage renal disease (HCC) [N18.6])    Surgeons: Imani Craig MD Responsible Provider: Luis Watters MD    Anesthesia Type: general ASA Status: 3            Anesthesia Type: No value filed.    Benny Phase I: Benny Score: 10    Benny Phase II: Benny Score: 10    Anesthesia Post Evaluation    Patient location during evaluation: PACU  Patient participation: complete - patient participated  Level of consciousness: awake  Airway patency: patent  Nausea & Vomiting: no vomiting and no nausea  Cardiovascular status: hemodynamically stable  Respiratory status: acceptable  Hydration status: euvolemic  Pain management: adequate    No notable events documented.

## 2024-07-23 NOTE — PERIOP NOTE
Patient /Family /Designee has been informed that [facility] is not responsible for patient belongings per policy and the signed [facility] Patient Agreement document.  Personal items should be sent home or checked in with security.  Patient /Family /Designee selected the following action:                            [x]  Send personal items home with a family member or friend                                                 []  Check in personal items with security, excluding clothing                            []  Maintain personal items at the bedside, against recommendation                                 by [facility]                                   ** If patient /family /designee chooses to maintain personal items at the bedside,                                      Complete the patient belongings inventory in the EMR.   Belongings are with wife, Mary Shetty.  Number: [phone]

## 2024-07-23 NOTE — H&P
[facility]       History:   68 y/o M with ESRD undergoing HD via a TDC. He has R sided hemiparesis following CVA. AVF or graft has been recommended.        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     On Allopurinol    Chronic hypokalemia     Persistent chronic hypokalemia + hypertension; ?primary hyperaldosteronism    CKD (chronic kidney disease)     ESRD (end stage renal disease) on dialysis ([facility])     M-W-F at [facility] [phone]    Hypertension     Nephrotic syndrome     Obesity, Class I, BMI 30-34.9     Obstructive sleep apnea on CPAP     Pure hypercholesterolemia     Received intravenous tissue plasminogen activator (tPA) in emergency department 8/12/2019    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[facility])     ALECT2    Secondary hyperparathyroidism of renal origin ([facility]) 8/18/2019    PTH (8/18/2019) = 101.7    Type 2 diabetes mellitus with stage 2 chronic kidney disease ([facility])     HbA1c (8/13/2019) = 6.6 no meds    Vitamin B12 deficiency anemia 8/14/2019    Vitamin B12 (8/14/2019) = 208    Vitamin D deficiency 8/19/2019    Vitamin D 25-Hydroxy (8/19/2019) = 16.2      Past Surgical History:   Procedure Laterality Date    COLONOSCOPY N/A 4/15/2019    COLONOSCOPY performed by Héctor Piedra MD at [facility] ENDOSCOPY    COLONOSCOPY N/A 12/20/2021    COLONOSCOPY with polypectomies performed by Héctor Piedra MD at [facility] ENDOSCOPY    CT BIOPSY RENAL  11/2/2023    CT BIOPSY RENAL 11/2/2023 [facility] RAD CT    INVASIVE VASCULAR N/A 6/12/2024    Tunnel dialysis catheter exchange performed by Adelaide Titus MD at [facility] CARDIAC CATH LAB    IR INSERT TUNNELED CV CATH WO PORT < 5YRS  5/2/2024    IR INSERT TUNNELED CV CATH WO PORT < 5YRS 5/2/2024 Zakia Mccoy PA  sertraline (ZOLOFT) 50 MG tablet Take 1 tablet by mouth daily ceived the following from [facility]: Outside name: sertraline (ZOLOFT) 50 mg tablet       No Known Allergies  Social History     Tobacco Use    Smoking status: Former     Types: Cigarettes    Smokeless tobacco: Never   Vaping Use    Vaping Use: Never used   Substance Use Topics    Alcohol use: No     Alcohol/week: 0.0 standard drinks of alcohol    Drug use: No     Family History   Problem Relation Age of Onset    Prostate Cancer Father     Heart Disease Father     Cancer Mother         Gastric cancer       PE:   Blood pressure (!) 153/66, pulse (!) 48, temperature 98.7 °F (37.1 °C), temperature source Oral, resp. rate 20, height 1.753 m (5' 9\"), weight 80.9 kg (178 lb 6.4 oz), SpO2 99 %.    NAD  Breathing comfortably  RUE warm. Contracted      Labs:   Lab Results   Component Value Date    WBC 7.8 06/12/2024    HGB 8.8 (L) 06/12/2024    HCT 27.6 (L) 06/12/2024    MCV 86.0 06/12/2024     06/12/2024     Lab Results   Component Value Date/Time     06/12/2024 07:25 AM    K 3.3 06/12/2024 07:25 AM     06/12/2024 07:25 AM    CO2 29 06/12/2024 07:25 AM    BUN 54 06/12/2024 07:25 AM    CREATININE 4.03 06/12/2024 07:25 AM    GLUCOSE 96 06/12/2024 07:25 AM    CALCIUM 8.9 06/12/2024 07:25 AM    LABGLOM 15 06/12/2024 07:25 AM    LABGLOM 27 04/17/2024 07:07 AM              IMPRESSION:   ESRD with need for long term access.   S/p CVA with R hemiparesis. He very much would like to attempt placement of AV access on his RUE.      PLAN:   RUE AVF or AVG.   Risks, benefits and alternatives were discussed with the patient including but not limited to bleeding, infection, injury to surrounding structures including nerves and/or other organs, scar tissue accumulation, need for surveillance, need for further procedures, inability to intervene, atrophic veins of the RUE which do not mature into a fistula.  The patient expressed understanding

## 2024-07-23 NOTE — BRIEF OP NOTE
Brief Postoperative Note      Patient: Amish Shetty  YOB: 1955  MRN: [id number]    Date of Procedure: 7/23/2024    Pre-Op Diagnosis Codes:     * End stage renal disease (HCC) [N18.6]    Post-Op Diagnosis: Same    Procedure:  RUE radiocephalic AVF placement    Surgeon(s):  Imani Craig MD    Assistant:  Surgical Assistant: Carlos Varela    Anesthesia: General    Estimated Blood Loss (mL): 5mL    Complications: none    Specimens:   * No specimens in log *    Implants:  * No implants in log *      Drains:   [REMOVED] External Urinary Catheter (Removed)       [REMOVED] External Urinary Catheter (Removed)       [REMOVED] External Urinary Catheter (Removed)       Findings:  Infection Present At Time Of Surgery (PATOS) (choose all levels that have infection present):  No infection present  Other Findings: Fibrotic tissue of the wrist. Healthy distal cephalic vein which distended easily with heparin saline flush. Calcific radial artery. Strong thrill at end of procedure. Strong distal radial signal.       Electronically signed by mIani Craig MD on 7/23/2024 at 8:54 AM

## 2024-07-28 NOTE — OP NOTE
fashion. Timeout was performed.     A longitudinal incision was made in the distal RUE at the wrist, between the cephalic vein and radial artery. The cephalic vein was found to be robust, with spasm present upon dissection. Approx 3m of lenth was gently dissected. The radial artery was exposed and controlled with silastic vessel loops. The artery was calcific.     IV heparin was administered. The cephalic vein was ligated in the distal wrist. It distended nicely upon flushing. The radial artery was clamped and arteriotomy was made. The artery was calcified but otherwise patent. The cephalic vein was beveled and and end-to-side anastomosis was performed between the cephalic vein and radial artery. Backbleeding and forward flushing were performed. There was a strong thrill present in the fistula. There was a strong radial Doppler signal distal to the anastomosis.     Protamine was administered. Hemostasis was achieved. The incision was irrigated. The deep dermal tissue was closed with in interrupted 3-0 vicryl sutures.  The skin was closed with interrupted 3-0 nylon sutures. A DSD was applied.     The pt tolerated the procedure well. All instrument, sponge, and needle counts were correct. The pt was awakened and taken to recovery in stable condition.     Imani Craig MD  Vascular Surgeon

## 2024-08-14 ENCOUNTER — OFFICE VISIT (OUTPATIENT)
Age: 69
End: 2024-08-14

## 2024-08-14 VITALS
WEIGHT: 180.12 LBS | SYSTOLIC BLOOD PRESSURE: 150 MMHG | OXYGEN SATURATION: 99 % | HEART RATE: 51 BPM | HEIGHT: 69 IN | BODY MASS INDEX: 26.68 KG/M2 | DIASTOLIC BLOOD PRESSURE: 70 MMHG

## 2024-08-14 DIAGNOSIS — N18.6 END STAGE RENAL DISEASE (HCC): Primary | ICD-10-CM

## 2024-08-14 PROCEDURE — 99024 POSTOP FOLLOW-UP VISIT: CPT | Performed by: SURGERY

## 2024-08-21 ENCOUNTER — OFFICE VISIT (OUTPATIENT)
Age: 69
End: 2024-08-21

## 2024-08-21 VITALS
HEIGHT: 69 IN | DIASTOLIC BLOOD PRESSURE: 62 MMHG | BODY MASS INDEX: 27.43 KG/M2 | WEIGHT: 185.19 LBS | OXYGEN SATURATION: 98 % | HEART RATE: 55 BPM | SYSTOLIC BLOOD PRESSURE: 138 MMHG

## 2024-08-21 DIAGNOSIS — N18.6 END STAGE RENAL DISEASE (HCC): Primary | ICD-10-CM

## 2024-08-21 DIAGNOSIS — I77.0 ARTERIOVENOUS FISTULA (HCC): ICD-10-CM

## 2024-08-21 PROCEDURE — 99024 POSTOP FOLLOW-UP VISIT: CPT | Performed by: SURGERY

## 2024-08-21 NOTE — PROGRESS NOTES
[facility]    Vascular Surgery Postoperative Office Visit    Amish Shetty  Chief Complaint   Patient presents with    ESRD     Follow up        History:  69 y.o. male is status post RUE RC AVF placement. See prior notes for full hx. He is 3 weeks postop and returns for suture removal. No changes to his hx.     Physical Exam:    /62   Pulse 55   Ht 1.753 m (5' 9\")   Wt 84 kg (185 lb 3 oz)   SpO2 98%   BMI 27.35 kg/m²      Constitutional:  Patient is well developed, well nourished, and not distressed.   HEENT: Atraumatic, normocephalic.   Eyes:   Cunjunctivae clear, no scleral icterus  Cardiovascular:  Normal rate, regular rhythm, normal heart sounds.  Pulmonary/Chest: Effort normal and breath sounds normal.  he has no wheezes or no rales.   Abdominal:   Soft, non-distended. No tenderness to palpation.   Extremities:   RUE warm. Strong thrill over the fistula in the distal upper arm. No edema. Incision intact. Sutures removed w/o event   Neurological:  he  is alert and oriented x3. Motor & sensory grossly intact in all 4 limbs.   Psych: Appropriate mood and affect.       Impression:  ESRD  3 weeks s/p RUE RC AVF placement. 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

## 2024-08-22 NOTE — PROGRESS NOTES
[facility]    Vascular Surgery Postoperative Office Visit    Amish Shetty  Chief Complaint   Patient presents with    ESRD     FOLLOW UP        History:  69 y.o. male is status post RUE RC AVF placement. See prior notes for full hx. He is 3 weeks postop and returns for suture removal. No changes to his hx.       Physical Exam:    BP (!) 150/70   Pulse 51   Ht 1.753 m (5' 9\")   Wt 81.7 kg (180 lb 1.9 oz)   SpO2 99%   BMI 26.60 kg/m²      Constitutional:  Patient is well developed, well nourished, and not distressed.   HEENT: Atraumatic, normocephalic.   Eyes:   Cunjunctivae clear, no scleral icterus  Cardiovascular:  Normal rate, regular rhythm, normal heart sounds.  Pulmonary/Chest: Effort normal and breath sounds normal.  he has no wheezes or no rales.   Abdominal:   Soft, non-distended. No tenderness to palpation.   Extremities: RUE warm. Strong thrill over the fistula in the distal upper arm. No edema. Incision intact. Sutures removed w/o event  Neurological:  he  is alert and oriented x3. Motor & sensory grossly intact in all 4 limbs.   Psych: Appropriate mood and affect.       Impression:  ESRD  3 weeks s/p RUE RC AVF placement. 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

## 2024-10-09 ENCOUNTER — OFFICE VISIT (OUTPATIENT)
Age: 69
End: 2024-10-09

## 2024-10-09 VITALS
HEART RATE: 42 BPM | HEIGHT: 69 IN | SYSTOLIC BLOOD PRESSURE: 170 MMHG | WEIGHT: 181 LBS | BODY MASS INDEX: 26.81 KG/M2 | DIASTOLIC BLOOD PRESSURE: 80 MMHG | OXYGEN SATURATION: 100 %

## 2024-10-09 DIAGNOSIS — N18.6 END STAGE RENAL DISEASE (HCC): Primary | ICD-10-CM

## 2024-10-09 DIAGNOSIS — I77.0 ARTERIOVENOUS FISTULA (HCC): ICD-10-CM

## 2024-10-09 DIAGNOSIS — I69.951 HEMIPARESIS OF RIGHT DOMINANT SIDE AS LATE EFFECT OF CEREBROVASCULAR DISEASE, UNSPECIFIED CEREBROVASCULAR DISEASE TYPE (HCC): ICD-10-CM

## 2024-10-09 PROCEDURE — 99024 POSTOP FOLLOW-UP VISIT: CPT | Performed by: SURGERY

## 2024-10-09 NOTE — PROGRESS NOTES
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    Vitamin B12 deficiency anemia    Primary osteoarthritis of right ankle    Type 2 diabetes mellitus with hyperglycemia, without long-term current use of insulin (HCC)    Received intravenous tissue plasminogen activator (tPA) in emergency department    Proteinuria    Atrial fibrillation (HCC)    Acute renal failure superimposed on stage 3b chronic kidney disease (HCC)    Gout    Pericardial effusion    Acute on chronic diastolic heart failure (HCC)    History of CVA (cerebrovascular accident)    Nephrotic syndrome    Edema    Skin tear of forearm without complication, sequela    Benign essential HTN    Acute on chronic anemia    Palliative care encounter    ACP (advance care planning)    Goals of care, counseling/discussion    DNR (do not resuscitate) discussion    Acute heart failure with preserved ejection fraction (HCC)    Gastrointestinal hemorrhage with melena    Acute blood loss anemia    Acute decompensated heart failure (HCC)    Acute pulmonary edema    Debility    End stage renal disease (HCC)     Past Surgical History:   Procedure Laterality Date    COLONOSCOPY N/A 4/15/2019    COLONOSCOPY performed by Héctor Piedra MD at [facility] ENDOSCOPY    COLONOSCOPY N/A 12/20/2021    COLONOSCOPY with polypectomies performed by Héctor Piedra MD at [facility] ENDOSCOPY    CT BIOPSY RENAL  11/2/2023    CT BIOPSY RENAL 11/2/2023 [facility] RAD CT    DIALYSIS FISTULA CREATION Right 7/23/2024    RIGHT UPPER EXTREMITY ARTERIOVENOUS FISTULA CREATION RADIOCEPHALIC performed by Imani Craig MD at [facility] CARDIAC SURGERY    INVASIVE VASCULAR N/A 6/12/2024    Tunnel dialysis catheter exchange performed by Adelaide Titus MD at [facility] CARDIAC CATH LAB    IR INSERT

## 2024-10-16 ENCOUNTER — TELEPHONE (OUTPATIENT)
Age: 69
End: 2024-10-16

## 2024-10-16 ENCOUNTER — HOSPITAL ENCOUNTER (OUTPATIENT)
Facility: HOSPITAL | Age: 69
Setting detail: OUTPATIENT SURGERY
Discharge: HOME | End: 2024-11-02
Attending: SURGERY | Admitting: SURGERY
Payer: MEDICARE

## 2024-10-16 ENCOUNTER — HOSPITAL ENCOUNTER (OUTPATIENT)
Facility: HOSPITAL | Age: 69
Setting detail: OUTPATIENT SURGERY
Discharge: HOME | End: 2024-10-16
Attending: SURGERY | Admitting: SURGERY
Payer: MEDICARE

## 2024-10-16 VITALS
SYSTOLIC BLOOD PRESSURE: 169 MMHG | RESPIRATION RATE: 21 BRPM | DIASTOLIC BLOOD PRESSURE: 95 MMHG | OXYGEN SATURATION: 88 % | HEART RATE: 46 BPM

## 2024-10-16 DIAGNOSIS — N18.6 ESRD (END STAGE RENAL DISEASE) (HCC): ICD-10-CM

## 2024-10-16 LAB
ANION GAP BLD CALC-SCNC: 6 (ref 10–20)
CA-I BLD-MCNC: 0.89 MMOL/L (ref 1.15–1.33)
CHLORIDE BLD-SCNC: 108 MMOL/L (ref 100–111)
CO2 BLD-SCNC: 28 MMOL/L (ref 22–29)
CREAT UR-MCNC: 5.1 MG/DL (ref 0.6–1.3)
GLUCOSE BLD STRIP.AUTO-MCNC: 70 MG/DL (ref 74–99)
POTASSIUM BLD-SCNC: 4.2 MMOL/L (ref 3.5–5.5)
SODIUM BLD-SCNC: 142 MMOL/L (ref 136–145)

## 2024-10-16 PROCEDURE — 6360000004 HC RX CONTRAST MEDICATION: Performed by: SURGERY

## 2024-10-16 PROCEDURE — 99152 MOD SED SAME PHYS/QHP 5/>YRS: CPT | Performed by: SURGERY

## 2024-10-16 PROCEDURE — 36581 REPLACE TUNNELED CV CATH: CPT | Performed by: SURGERY

## 2024-10-16 PROCEDURE — 76000 FLUOROSCOPY <1 HR PHYS/QHP: CPT | Performed by: SURGERY

## 2024-10-16 PROCEDURE — 2580000003 HC RX 258: Performed by: SURGERY

## 2024-10-16 PROCEDURE — 80047 BASIC METABLC PNL IONIZED CA: CPT

## 2024-10-16 PROCEDURE — 6360000002 HC RX W HCPCS: Performed by: SURGERY

## 2024-10-16 PROCEDURE — 2709999900 HC NON-CHARGEABLE SUPPLY: Performed by: SURGERY

## 2024-10-16 PROCEDURE — 77001 FLUOROGUIDE FOR VEIN DEVICE: CPT | Performed by: SURGERY

## 2024-10-16 PROCEDURE — C1769 GUIDE WIRE: HCPCS | Performed by: SURGERY

## 2024-10-16 PROCEDURE — C1750 CATH, HEMODIALYSIS,LONG-TERM: HCPCS | Performed by: SURGERY

## 2024-10-16 PROCEDURE — 2500000003 HC RX 250 WO HCPCS: Performed by: SURGERY

## 2024-10-16 PROCEDURE — C1894 INTRO/SHEATH, NON-LASER: HCPCS | Performed by: SURGERY

## 2024-10-16 RX ORDER — FENTANYL CITRATE 50 UG/ML
INJECTION, SOLUTION INTRAMUSCULAR; INTRAVENOUS PRN
Status: DISCONTINUED | OUTPATIENT
Start: 2024-10-16 | End: 2024-10-16 | Stop reason: HOSPADM

## 2024-10-16 RX ORDER — IODIXANOL 320 MG/ML
INJECTION, SOLUTION INTRAVASCULAR PRN
Status: DISCONTINUED | OUTPATIENT
Start: 2024-10-16 | End: 2024-10-16 | Stop reason: HOSPADM

## 2024-10-16 RX ORDER — MIDAZOLAM HYDROCHLORIDE 1 MG/ML
INJECTION, SOLUTION INTRAMUSCULAR; INTRAVENOUS PRN
Status: DISCONTINUED | OUTPATIENT
Start: 2024-10-16 | End: 2024-10-16 | Stop reason: HOSPADM

## 2024-10-16 NOTE — OP NOTE
Operative Note      Patient: Amish Shetty  YOB: 1955  MRN: [id number]    Date of Procedure: 10/16/2024    Pre-Op Diagnosis Codes:      * ESRD (end stage renal disease) ([facility]) [N18.6]  Malfunctioning right internal jugular vein tunneled hemodialysis catheter    Post-Op Diagnosis: Same       Procedure:  1.  SVC gram  2.  Exchange of right internal jugular vein tunneled hemodialysis catheter over wires to 19 cm Palindrome catheter    Surgeon(s):  Adelaide Titus MD    Assistant:   * No surgical staff found *    Anesthesia: IV Sedation: Versed-1 mg, fentanyl-50 mcg and local anesthesia  Conscious sedation time is 18 minutes    Estimated Blood Loss (mL): Minimal    Complications: None    Specimens:   * No specimens in log *    Implants:  19 cm Palindrome catheter in the right internal jugular vein      Findings:  Infection Present At Time Of Surgery (PATOS) (choose all levels that have infection present):  No infection present  Other Findings: Patent right innominate vein and superior vena cava    Detailed Description of Procedure:   Indication for the procedure the patient is a 69-year-old male, with end-stage renal disease, on hemodialysis through a right internal jugular vein tunneled hemodialysis catheter, that was exchanged in 6/2024.  The catheter could not be used for hemodialysis treatment, and was noted to be occluded today.    Informed consent was obtained from the patient.    The patient was identified and placed supine on angiographic table.  The right side of the neck and the upper chest including the catheter were cleaned and draped in a sterile manner.  He received 2 g Ancef intravenously as preoperative antibiotic prophylaxis.  A timeout was performed.  Intravenous sedation was administered under my direct supervision.  After administering local anesthesia around the catheter exit site, the catheter cuff was bluntly dissected from the surrounding tissue.  An Amplatz wire was placed through  one of the ports of the catheter and advanced into the inferior vena cava under fluoroscopic guidance.  The catheter was then retrieved to the level of the internal jugular vein and imaging of the central veins was performed through the catheter.  The veins were noted to be patent.  A Bentson wire was advanced through the other port of the catheter, and the catheter was removed over the wires.  A new 19 cm catheter was advanced over the wires, and positioned with its tip in the right atrium.  There was good flow through both ports of the catheter.  The ports were flushed with heparinized saline and hep-locked with full-strength heparin.  The final position of the catheter was noted under fluoroscopy.  The catheter was anchored to the skin at the exit site with 2-0 Prolene sutures and dry sterile dressing was placed.    The patient tolerated the procedure well and was hemodynamically stable.  No immediate complications were noted.    Electronically signed by Adelaide Titus MD on 10/16/2024 at 1:55 PM

## 2024-10-16 NOTE — PROGRESS NOTES
AVS Discharge instructions reviewed with patient and copy given to patient.  All questions answered, including when to resume medications.  Patient verbalized understanding to all discharge instructions.  PIV removed. Procedural site within normal limits.  No hematoma or bleeding noted from procedural and PIV site. No pain noted at discharge. Patient back to neurological baseline, alert and oriented times 4. Patient discharged in the presence of a responsible adult (friend) who will accompany patient home and is able to report post procedure complications.

## 2024-10-16 NOTE — PRE SEDATION
Sedation Plan  ASA: class 4 - patient with severe systemic disease that is a constant threat to life     Mallampati class: III - soft palate, base of uvula visible.    Sedation plan: local anesthesia and level 2-1: moderate/analgesia (conscious sedation)    Risks, benefits, and alternatives discussed with patient.  Use of blood products discussed with patient who consented to blood products.       Immediate reassessment prior to sedation:  Patient's status reviewed and vital signs assessed; acceptable to perform procedure and proceed to administer sedation as planned.

## 2024-10-16 NOTE — TELEPHONE ENCOUNTER
Called and left message at 10:40am to let patient know that surgery is still pending to be scheduled for Right Upper Extremity Arteriovenous Fistula Creation with possible Fistulogram with Dr. Craig. Will call patient on 10/22/2024 and see if we can schedule the surgery on 10/24/24. Waiting for patient to call back.

## 2024-10-16 NOTE — PROGRESS NOTES
Cath holding summary     Patient escorted to cath holding from waiting area ambulatory, alert and oriented x 4, voicing no complaints.  Changed into gown and placed on monitor.   NPO since MN.  Lab results, med rec and H&P reviewed on chart.  PIV x 1 inserted without difficulty. Family at bedside.

## 2024-10-16 NOTE — H&P
Amish Shetty  No chief complaint on file.    Presents for exchange of tunneled hemodialysis catheter    History and Physical    The patient is a 69-year-old male, with end-stage renal disease, on hemodialysis through a right internal jugular vein tunneled hemodialysis catheter, that could not be used for hemodialysis treatment today.  His last hemodialysis was 2 days ago.  He was having intermittent problems with the catheter 2 weeks ago.  The catheter was exchanged in 6/2024.  He had creation of a right radiocephalic AV fistula in 7/24 which is not mature enough to be accessed for hemodialysis treatments    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     On Allopurinol    Chronic hypokalemia     Persistent chronic hypokalemia + hypertension; ?primary hyperaldosteronism    CKD (chronic kidney disease)     ESRD (end stage renal disease) on dialysis ([facility])     M-W-F at [facility] [phone]    Hypertension     Nephrotic syndrome     Obesity, Class I, BMI 30-34.9     Obstructive sleep apnea on CPAP     Pure hypercholesterolemia     Received intravenous tissue plasminogen activator (tPA) in emergency department 8/12/2019    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[facility])     ALECT2    Secondary hyperparathyroidism of renal origin ([facility]) 8/18/2019    PTH (8/18/2019) = 101.7    Type 2 diabetes mellitus with stage 2 chronic kidney disease ([facility])     HbA1c (8/13/2019) = 6.6 no meds    Vitamin B12 deficiency anemia 8/14/2019    Vitamin B12 (8/14/2019) = 208    Vitamin D deficiency 8/19/2019    Vitamin D 25-Hydroxy (8/19/2019) = 16.2      Patient Active Problem List   Diagnosis    Secondary hyperparathyroidism of renal origin (HCC)    Impaired mobility and ADLs    Right hemiparesis ([facility])

## 2024-10-16 NOTE — TELEPHONE ENCOUNTER
Patient called and stated that his Catheter for dialysis did not work today. He was not able to get treatment at all. He had breakfast at 830am. Spoke to Dr. Craig and stated that he will need a CVC exchange and will have her partner, Dr. Titus to do the case. Spoke to Dr. Titus and agreed. Patient scheduled today, 10/16/2024 at 12:30pm. Patient will be leaving his house and will go straight to [facility], cath lab. His wife is bringing him.

## 2024-10-16 NOTE — PROGRESS NOTES
TRANSFER - OUT REPORT:    Verbal report given to Criselda ALEXANDRE  on Amish Shetty  being transferred to Vascular lab for ordered procedure       Report consisted of patient's Situation, Background, Assessment and   Recommendations(SBAR).     Information from the following report(s) Nurse Handoff Report, Intake/Output, MAR, Recent Results, Cardiac Rhythm NSR, Pre Procedure Checklist, Procedure Verification, and Neuro Assessment was reviewed with the receiving nurse.           Lines:   Tunneled Hemodialysis Catheter Right Neck (Active)        Opportunity for questions and clarification was provided.      Patient transported with:  Tech

## 2024-10-23 ENCOUNTER — PREP FOR PROCEDURE (OUTPATIENT)
Age: 69
End: 2024-10-23

## 2024-10-23 DIAGNOSIS — N18.6 END STAGE RENAL DISEASE (HCC): Primary | ICD-10-CM

## 2024-10-23 DIAGNOSIS — N18.6 ESRD (END STAGE RENAL DISEASE) (HCC): ICD-10-CM

## 2024-10-23 PROBLEM — T82.898A ARTERIOVENOUS FISTULA OCCLUSION (HCC): Status: ACTIVE | Noted: 2024-10-23

## 2024-10-24 ENCOUNTER — TELEPHONE (OUTPATIENT)
Age: 69
End: 2024-10-24

## 2024-10-24 NOTE — PERIOP NOTE
Instructions for your surgery at [facility]      Today's Date:  10/24/2024      Patient's Name:  Amish Shetty           Surgery Date:  11/1/2024              Please enter the main entrance of the hospital and check-in at the front security desk located in the lobby. They will direct you to the area to report for your surgery.     Do NOT eat or drink anything, including candy, gum, or ice chips after midnight prior to your surgery, unless you have specific instructions from your surgeon or anesthesia provider to do so.  Brush your teeth before coming to the hospital. You may swish with water, but do not swallow.  No smoking/Vaping/E-Cigarettes 24 hours prior to the day of surgery.  No alcohol 24 hours prior to the day of surgery.  No recreational drugs for one week prior to the day of surgery.  Bring Photo ID, Insurance information, and Co-pay if required on day of surgery.  Bring in pertinent legal documents, such as, Medical Power of , DNR, Advance Directive, etc.  Leave all valuables, including money/purse, at home.  Remove all jewelry, including ALL body piercings, nail polish, acrylic nails, and makeup (including mascara); no lotions, powders, deodorant, or perfume/cologne/after shave on the skin.  Follow instruction for Hibiclens washes and CHG wipes from surgeon's office.   Glasses and dentures may be worn to the hospital. They must be removed prior to surgery. Please bring case/container for glasses or dentures.   Contact lenses should not be worn on day of surgery.   Call your doctor's office if symptoms of a cold or illness develop within 24-48 hours prior to your surgery.  Call your doctor's office if you have any questions concerning insurance or co-pays.  15. AN ADULT (relative or friend 18 years or older) MUST DRIVE YOU HOME AFTER YOUR SURGERY.  16. Please make arrangements for a responsible adult (18 years or older) to be with you for 24 hours after your surgery.   17.

## 2024-10-24 NOTE — TELEPHONE ENCOUNTER
Called and left message at 9:17am. To let patient know that I am still making arrangements with [facility] for him to get dialysis treatment on 10/31/24, Thursday instead of Friday due to surgery scheduled with Dr. Craig for Right Upper Extremity Arteriovenous Fistula revision with possible fistulogram. Waiting for patient to call back.     Called [facility] and spoke to Bree and she is still working on chair time for October 31, Thursday for patient to be able to get treatment the day before surgery. She will call back.

## 2024-10-31 ENCOUNTER — ANESTHESIA EVENT (OUTPATIENT)
Dept: CARDIOTHORACIC SURGERY | Facility: HOSPITAL | Age: 69
End: 2024-10-31
Payer: MEDICARE

## 2024-11-01 ENCOUNTER — ANESTHESIA (OUTPATIENT)
Dept: CARDIOTHORACIC SURGERY | Facility: HOSPITAL | Age: 69
End: 2024-11-01
Payer: MEDICARE

## 2024-11-01 ENCOUNTER — TELEPHONE (OUTPATIENT)
Age: 69
End: 2024-11-01

## 2024-11-01 NOTE — TELEPHONE ENCOUNTER
Spoke to patient on 11/01/2024 at 10:30am about surgery scheduled on 11/02/2024 with Dr. Craig for Right Upper Extremity Arteriovenous Fistula Revision with possible fistulogram which was originally scheduled today, 11/1/24 but was rescheduled.     Patient instructions:   Check in at [facility] Main entrance at 8:00am.  Do not eat, drink or chew gum after midnight prior to surgery.   Only take heart and blood pressure medication with a sip of water the morning of the procedure.   Patient instructed to have RIDE available when discharged from hospital. Patient is not allowed to drive, walk or go home using public transportation (including Lyft and Uber).   Will call patient for discharge appointment once surgery is completed.   Patient confirmed and repeated instructions.

## 2024-11-02 ENCOUNTER — APPOINTMENT (OUTPATIENT)
Facility: HOSPITAL | Age: 69
End: 2024-11-02
Attending: SURGERY
Payer: MEDICARE

## 2024-11-02 VITALS
HEART RATE: 44 BPM | HEIGHT: 69 IN | OXYGEN SATURATION: 100 % | DIASTOLIC BLOOD PRESSURE: 66 MMHG | BODY MASS INDEX: 27.25 KG/M2 | WEIGHT: 184 LBS | RESPIRATION RATE: 19 BRPM | SYSTOLIC BLOOD PRESSURE: 155 MMHG | TEMPERATURE: 97.6 F

## 2024-11-02 LAB
ANION GAP SERPL CALC-SCNC: 8 MMOL/L (ref 3–18)
BUN SERPL-MCNC: 36 MG/DL (ref 7–18)
BUN/CREAT SERPL: 7 (ref 12–20)
CALCIUM SERPL-MCNC: 9.1 MG/DL (ref 8.5–10.1)
CHLORIDE SERPL-SCNC: 105 MMOL/L (ref 100–111)
CO2 SERPL-SCNC: 26 MMOL/L (ref 21–32)
CREAT SERPL-MCNC: 5.32 MG/DL (ref 0.6–1.3)
GLUCOSE SERPL-MCNC: 89 MG/DL (ref 74–99)
POTASSIUM SERPL-SCNC: 3.5 MMOL/L (ref 3.5–5.5)
SODIUM SERPL-SCNC: 139 MMOL/L (ref 136–145)

## 2024-11-02 PROCEDURE — 2500000003 HC RX 250 WO HCPCS: Performed by: SURGERY

## 2024-11-02 PROCEDURE — 6360000004 HC RX CONTRAST MEDICATION: Performed by: SURGERY

## 2024-11-02 PROCEDURE — 7100000001 HC PACU RECOVERY - ADDTL 15 MIN: Performed by: SURGERY

## 2024-11-02 PROCEDURE — 6360000002 HC RX W HCPCS: Performed by: ANESTHESIOLOGY

## 2024-11-02 PROCEDURE — 3700000001 HC ADD 15 MINUTES (ANESTHESIA): Performed by: SURGERY

## 2024-11-02 PROCEDURE — 2580000003 HC RX 258: Performed by: SURGERY

## 2024-11-02 PROCEDURE — 6360000002 HC RX W HCPCS: Performed by: STUDENT IN AN ORGANIZED HEALTH CARE EDUCATION/TRAINING PROGRAM

## 2024-11-02 PROCEDURE — 2709999900 HC NON-CHARGEABLE SUPPLY: Performed by: SURGERY

## 2024-11-02 PROCEDURE — 6360000002 HC RX W HCPCS: Performed by: SURGERY

## 2024-11-02 PROCEDURE — 3600000012 HC SURGERY LEVEL 2 ADDTL 15MIN: Performed by: SURGERY

## 2024-11-02 PROCEDURE — 7100000000 HC PACU RECOVERY - FIRST 15 MIN: Performed by: SURGERY

## 2024-11-02 PROCEDURE — 7100000010 HC PHASE II RECOVERY - FIRST 15 MIN: Performed by: SURGERY

## 2024-11-02 PROCEDURE — C1894 INTRO/SHEATH, NON-LASER: HCPCS | Performed by: SURGERY

## 2024-11-02 PROCEDURE — 2720000010 HC SURG SUPPLY STERILE: Performed by: SURGERY

## 2024-11-02 PROCEDURE — 2580000003 HC RX 258: Performed by: NURSE ANESTHETIST, CERTIFIED REGISTERED

## 2024-11-02 PROCEDURE — 80048 BASIC METABOLIC PNL TOTAL CA: CPT

## 2024-11-02 PROCEDURE — 7100000011 HC PHASE II RECOVERY - ADDTL 15 MIN: Performed by: SURGERY

## 2024-11-02 PROCEDURE — 36832 AV FISTULA REVISION OPEN: CPT | Performed by: SURGERY

## 2024-11-02 PROCEDURE — 3600000002 HC SURGERY LEVEL 2 BASE: Performed by: SURGERY

## 2024-11-02 PROCEDURE — 6370000000 HC RX 637 (ALT 250 FOR IP): Performed by: NURSE ANESTHETIST, CERTIFIED REGISTERED

## 2024-11-02 PROCEDURE — 2500000003 HC RX 250 WO HCPCS: Performed by: STUDENT IN AN ORGANIZED HEALTH CARE EDUCATION/TRAINING PROGRAM

## 2024-11-02 PROCEDURE — 3700000000 HC ANESTHESIA ATTENDED CARE: Performed by: SURGERY

## 2024-11-02 RX ORDER — SODIUM CHLORIDE 9 MG/ML
INJECTION, SOLUTION INTRAVENOUS PRN
Status: DISCONTINUED | OUTPATIENT
Start: 2024-11-02 | End: 2024-11-02 | Stop reason: HOSPADM

## 2024-11-02 RX ORDER — FAMOTIDINE 20 MG/1
20 TABLET, FILM COATED ORAL ONCE
Status: COMPLETED | OUTPATIENT
Start: 2024-11-02 | End: 2024-11-02

## 2024-11-02 RX ORDER — SODIUM CHLORIDE 0.9 % (FLUSH) 0.9 %
5-40 SYRINGE (ML) INJECTION PRN
Status: DISCONTINUED | OUTPATIENT
Start: 2024-11-02 | End: 2024-11-02 | Stop reason: HOSPADM

## 2024-11-02 RX ORDER — HEPARIN SODIUM 200 [USP'U]/100ML
INJECTION, SOLUTION INTRAVENOUS CONTINUOUS PRN
Status: DISCONTINUED | OUTPATIENT
Start: 2024-11-02 | End: 2024-11-02 | Stop reason: HOSPADM

## 2024-11-02 RX ORDER — OXYCODONE HYDROCHLORIDE 5 MG/1
5 TABLET ORAL EVERY 6 HOURS PRN
Qty: 6 TABLET | Refills: 0 | Status: SHIPPED | OUTPATIENT
Start: 2024-11-02 | End: 2024-11-02

## 2024-11-02 RX ORDER — ONDANSETRON 2 MG/ML
INJECTION INTRAMUSCULAR; INTRAVENOUS
Status: DISCONTINUED | OUTPATIENT
Start: 2024-11-02 | End: 2024-11-02 | Stop reason: SDUPTHER

## 2024-11-02 RX ORDER — SODIUM CHLORIDE 0.9 % (FLUSH) 0.9 %
5-40 SYRINGE (ML) INJECTION EVERY 12 HOURS SCHEDULED
Status: DISCONTINUED | OUTPATIENT
Start: 2024-11-02 | End: 2024-11-02 | Stop reason: HOSPADM

## 2024-11-02 RX ORDER — DEXAMETHASONE SODIUM PHOSPHATE 4 MG/ML
INJECTION, SOLUTION INTRA-ARTICULAR; INTRALESIONAL; INTRAMUSCULAR; INTRAVENOUS; SOFT TISSUE
Status: DISCONTINUED | OUTPATIENT
Start: 2024-11-02 | End: 2024-11-02 | Stop reason: SDUPTHER

## 2024-11-02 RX ORDER — BUPIVACAINE HYDROCHLORIDE 2.5 MG/ML
INJECTION, SOLUTION EPIDURAL; INFILTRATION; INTRACAUDAL
Status: DISCONTINUED
Start: 2024-11-02 | End: 2024-11-02 | Stop reason: HOSPADM

## 2024-11-02 RX ORDER — HEPARIN SODIUM 200 [USP'U]/100ML
INJECTION, SOLUTION INTRAVENOUS
Status: DISCONTINUED
Start: 2024-11-02 | End: 2024-11-02 | Stop reason: HOSPADM

## 2024-11-02 RX ORDER — IODIXANOL 320 MG/ML
INJECTION, SOLUTION INTRAVASCULAR
Status: DISCONTINUED
Start: 2024-11-02 | End: 2024-11-02 | Stop reason: HOSPADM

## 2024-11-02 RX ORDER — FENTANYL CITRATE 50 UG/ML
INJECTION, SOLUTION INTRAMUSCULAR; INTRAVENOUS
Status: DISCONTINUED | OUTPATIENT
Start: 2024-11-02 | End: 2024-11-02 | Stop reason: SDUPTHER

## 2024-11-02 RX ORDER — IODIXANOL 320 MG/ML
INJECTION, SOLUTION INTRAVASCULAR PRN
Status: DISCONTINUED | OUTPATIENT
Start: 2024-11-02 | End: 2024-11-02 | Stop reason: HOSPADM

## 2024-11-02 RX ORDER — GLYCOPYRROLATE 0.2 MG/ML
INJECTION INTRAMUSCULAR; INTRAVENOUS
Status: DISCONTINUED | OUTPATIENT
Start: 2024-11-02 | End: 2024-11-02 | Stop reason: SDUPTHER

## 2024-11-02 RX ORDER — OXYCODONE HYDROCHLORIDE 5 MG/1
5 TABLET ORAL EVERY 6 HOURS PRN
Qty: 8 TABLET | Refills: 0 | Status: SHIPPED | OUTPATIENT
Start: 2024-11-02 | End: 2024-11-07

## 2024-11-02 RX ORDER — LIDOCAINE HYDROCHLORIDE 10 MG/ML
1 INJECTION, SOLUTION EPIDURAL; INFILTRATION; INTRACAUDAL; PERINEURAL
Status: DISCONTINUED | OUTPATIENT
Start: 2024-11-02 | End: 2024-11-02 | Stop reason: HOSPADM

## 2024-11-02 RX ORDER — LIDOCAINE HYDROCHLORIDE 20 MG/ML
INJECTION, SOLUTION EPIDURAL; INFILTRATION; INTRACAUDAL; PERINEURAL
Status: DISCONTINUED | OUTPATIENT
Start: 2024-11-02 | End: 2024-11-02 | Stop reason: SDUPTHER

## 2024-11-02 RX ORDER — KETAMINE HCL 50MG/ML(1)
SYRINGE (ML) INTRAVENOUS
Status: DISCONTINUED | OUTPATIENT
Start: 2024-11-02 | End: 2024-11-02 | Stop reason: SDUPTHER

## 2024-11-02 RX ORDER — HYDRALAZINE HYDROCHLORIDE 20 MG/ML
10 INJECTION INTRAMUSCULAR; INTRAVENOUS AS NEEDED
Status: DISCONTINUED | OUTPATIENT
Start: 2024-11-02 | End: 2024-11-02 | Stop reason: HOSPADM

## 2024-11-02 RX ORDER — PROPOFOL 10 MG/ML
INJECTION, EMULSION INTRAVENOUS
Status: DISCONTINUED | OUTPATIENT
Start: 2024-11-02 | End: 2024-11-02 | Stop reason: SDUPTHER

## 2024-11-02 RX ORDER — OXYCODONE HYDROCHLORIDE 5 MG/1
5 TABLET ORAL EVERY 6 HOURS PRN
Qty: 6 TABLET | Refills: 0 | Status: SHIPPED | OUTPATIENT
Start: 2024-11-02 | End: 2024-11-02 | Stop reason: HOSPADM

## 2024-11-02 RX ADMIN — HYDRALAZINE HYDROCHLORIDE 10 MG: 20 INJECTION INTRAMUSCULAR; INTRAVENOUS at 14:42

## 2024-11-02 RX ADMIN — PROPOFOL 70 MCG/KG/MIN: 10 INJECTION, EMULSION INTRAVENOUS at 12:53

## 2024-11-02 RX ADMIN — FENTANYL CITRATE 25 MCG: 50 INJECTION INTRAMUSCULAR; INTRAVENOUS at 13:05

## 2024-11-02 RX ADMIN — Medication 10 MG: at 12:52

## 2024-11-02 RX ADMIN — PROPOFOL 40 MG: 10 INJECTION, EMULSION INTRAVENOUS at 12:52

## 2024-11-02 RX ADMIN — WATER 1000 MG: 1 INJECTION, SOLUTION INTRAMUSCULAR; INTRAVENOUS; SUBCUTANEOUS at 12:58

## 2024-11-02 RX ADMIN — DEXAMETHASONE SODIUM PHOSPHATE 4 MG: 4 INJECTION INTRA-ARTICULAR; INTRALESIONAL; INTRAMUSCULAR; INTRAVENOUS; SOFT TISSUE at 13:01

## 2024-11-02 RX ADMIN — ONDANSETRON 4 MG: 2 INJECTION INTRAMUSCULAR; INTRAVENOUS at 13:41

## 2024-11-02 RX ADMIN — LIDOCAINE HYDROCHLORIDE 40 MG: 20 INJECTION, SOLUTION EPIDURAL; INFILTRATION; INTRACAUDAL; PERINEURAL at 12:52

## 2024-11-02 RX ADMIN — FAMOTIDINE 20 MG: 20 TABLET ORAL at 09:30

## 2024-11-02 RX ADMIN — SODIUM CHLORIDE: 9 INJECTION, SOLUTION INTRAVENOUS at 09:02

## 2024-11-02 RX ADMIN — GLYCOPYRROLATE 0.1 MG: 0.2 INJECTION INTRAMUSCULAR; INTRAVENOUS at 12:49

## 2024-11-02 RX ADMIN — GLYCOPYRROLATE 0.1 MG: 0.2 INJECTION INTRAMUSCULAR; INTRAVENOUS at 12:41

## 2024-11-02 ASSESSMENT — PAIN - FUNCTIONAL ASSESSMENT: PAIN_FUNCTIONAL_ASSESSMENT: 0-10

## 2024-11-02 NOTE — ANESTHESIA PRE PROCEDURE
Department of Anesthesiology  Preprocedure Note       Name:  Amish Shetty   Age:  69 y.o.  :  1955                                          MRN:  [id number]         Date:  2024      Surgeon: Surgeon(s):  Imani Craig MD    Procedure: Procedure(s):  RIGHT UPPER EXTREMITY ARTERIOVENOUS FISTULA REVISION AND POSSIBLE RIGHT UPPER EXTREMITY FISTULOGRAM    Medications prior to admission:   Prior to Admission medications    Medication Sig Start Date End Date Taking? Authorizing Provider   sevelamer (RENVELA) 800 MG tablet Take 2 tablets by mouth 3 times daily (with meals)  Patient not taking: Reported on 10/24/2024 9/23/24   Areli Ambriz MD   finasteride (PROSCAR) 5 MG tablet Take 1 tablet by mouth daily 24   Caryn Larson PA   losartan (COZAAR) 25 MG tablet Take 1 tablet by mouth daily  Patient not taking: Reported on 10/24/2024 7/9/24   Areli Ambriz MD   isosorbide mononitrate (IMDUR) 30 MG extended release tablet Take 1 tablet by mouth daily 24   Areli Ambriz MD   Multiple Vitamins-Minerals (MULTIVITAMIN ADULT, MINERALS, PO) Take 1 tablet by mouth daily  Patient not taking: Reported on 10/24/2024    Areli Ambriz MD   tiZANidine (ZANAFLEX) 4 MG tablet Take 1 tablet by mouth nightly    Areli Ambriz MD   amLODIPine (NORVASC) 10 MG tablet Take 1 tablet by mouth daily  Patient not taking: Reported on 10/24/2024    Areli Ambriz MD   acetaminophen (TYLENOL) 325 MG tablet Take 2 tablets by mouth every 4 hours as needed (Pain Mild (1-3) or Fever greater than 100.5 F (38 C))  Patient not taking: Reported on 10/24/2024 5/22/24   Gertrudis Stewart APRN - CNP   furosemide (LASIX) 20 MG tablet Take 2 tablets by mouth 2 times daily  Patient taking differently: Take 2 tablets by mouth daily 24   Gertrudis Stewart APRN - CNP   polyethylene glycol (GLYCOLAX) 17 g packet Take 1 packet by mouth daily as needed for

## 2024-11-02 NOTE — DISCHARGE INSTRUCTIONS
[facility]  Discharge Information    Procedure(s) Performed: RUE fistulogram with ligation of competing vein    Surgeon: Dr. Imani Craig MD    Discharge Instructions:  You may shower as needed. Allow the soap and water to run over your incision(s) and gently pat dry. No tub bathing for 8 weeks.   No lifting > 10 lbs for 8 weeks.   Do not apply cream or ointment to the incisions. Keep a dry gauze over the incision(s) if there is mild drainage.   If the incisions develop redness or have worsening drainage, call the vascular clinic or seek emergency care immediately.   Follow up in vascular surgery clinic with Dr. Craig as scheduled.   Do not drive until you are evaluated at your postoperative visit with Dr. Craig.  Take oxycodone as needed for pain.   Medications: Resume all medications as prescribed.   Do not access the fistula for dialysis until cleared by vascular surgery.   Call our clinic and/or seek emergency care if you develop a fever, chills, shortness of breath, severe pain, wound drainage, wound redness, or any other concerns.     Vascular Clinic Phone Number: [phone]

## 2024-11-02 NOTE — BRIEF OP NOTE
Brief Postoperative Note      Patient: Amish Shetty  YOB: 1955  MRN: [id number]    Date of Procedure: 11/2/2024    Preop Dx:   RUE RC AVF with failure to mature  ESRD    Post-Op Diagnosis: Same       Procedure(s):  RIGHT UPPER EXTREMITY ARTERIOVENOUS FISTULA REVISION WITH COMPLETING LIGATION / RIGHT UPPER EXTREMITY FISTULOGRAM    Surgeon(s):  Imani Craig MD    Assistant:  Surgical Assistant: Carlos Varela    Anesthesia: MAC    Estimated Blood Loss (mL): 5mL    Complications: None    Specimens:   * No specimens in log *    Implants:  * No implants in log *      Drains:   [REMOVED] External Urinary Catheter (Removed)       [REMOVED] External Urinary Catheter (Removed)       [REMOVED] External Urinary Catheter (Removed)       Findings:  Infection Present At Time Of Surgery (PATOS) (choose all levels that have infection present):  No infection present  Other Findings: Patent anastomosis without stenosis. Fistula widely patent. One large competing branch which was ligated. One very small competing branch which was not ligated. Robust flow through the fistula after ligation of the large branch.     Disposition: to recovery in stable condition    Electronically signed by Imani Craig MD on 11/2/2024 at 2:59 PM

## 2024-11-02 NOTE — ANESTHESIA POSTPROCEDURE EVALUATION
Department of Anesthesiology  Postprocedure Note    Patient: Amish Shetty  MRN: [id number]  YOB: 1955  Date of evaluation: 11/2/2024    Procedure Summary       Date: 11/02/24 Room / Location: [facility] CV 02 / [facility] CARDIAC SURGERY    Anesthesia Start: 1243 Anesthesia Stop: 1402    Procedure: RIGHT UPPER EXTREMITY ARTERIOVENOUS FISTULA REVISION WITH COMPLETING LIGATION / RIGHT UPPER EXTREMITY FISTULOGRAM (Right) Diagnosis:       End stage renal disease (HCC)      Arteriovenous fistula occlusion (HCC)      (End stage renal disease (HCC) [N18.6])      (Arteriovenous fistula occlusion (HCC) [T82.898A])    Surgeons: Imani Craig MD Responsible Provider: Leticia Lenz MD    Anesthesia Type: General, TIVA ASA Status: 4            Anesthesia Type: General, TIVA    Benny Phase I: Benny Score: 10    Benny Phase II:      Anesthesia Post Evaluation    Patient location during evaluation: bedside  Patient participation: complete - patient participated  Airway patency: patent  Cardiovascular status: hemodynamically stable  Respiratory status: acceptable  Hydration status: stable    No notable events documented.

## 2024-11-02 NOTE — H&P
[facility]       History:   69 y.o. male is status post RUE RC AVF placement on 7/23/24, approx 3 mos ago. He continues currently to undergo HD via a TDC. He denies any R hand pain. Of note, he has paralysis of his RUE due to a prior CVA. His fistula has failed to mature. The anastomosis is patent, however there is concern clinically for competing branches. Fistulogram with possible balloon assisted maturation and possible competing branch ligation was recommended. His TDC was exchanged in the interim. No other changes to his history.       Past Medical History:   Diagnosis Date    Acute ischemic stroke ([facility]) 8/12/2019    Acute Ischemic Stroke (acute/early subacute infarct at the left posterior basal ganglia to corona radiata) with residual right hemiparesis, dysphagia and dysarthria    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     On Allopurinol    Chronic hypokalemia     Persistent chronic hypokalemia + hypertension; ?primary hyperaldosteronism    CKD (chronic kidney disease)     ESRD (end stage renal disease) on dialysis ([facility])     M-W-F at [facility] [phone]    Hypertension     Nephrotic syndrome     Obesity, Class I, BMI 30-34.9     Obstructive sleep apnea on CPAP     Pure hypercholesterolemia     Received intravenous tissue plasminogen activator (tPA) in emergency department 8/12/2019    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[facility])     ALECT2    Secondary hyperparathyroidism of renal origin ([facility]) 8/18/2019    PTH (8/18/2019) = 101.7    Type 2 diabetes mellitus with stage 2 chronic kidney disease ([facility])     HbA1c (8/13/2019) = 6.6 no meds    Vitamin B12 deficiency anemia 8/14/2019    Vitamin B12 (8/14/2019) = 208    Vitamin D deficiency 8/19/2019    Vitamin D 25-Hydroxy (8/19/2019) = 16.2      Past Surgical History:   Procedure Laterality Date    COLONOSCOPY N/A 4/15/2019    COLONOSCOPY performed by Héctor Piedra MD at [facility] ENDOSCOPY    COLONOSCOPY N/A 12/20/2021    COLONOSCOPY with

## 2024-11-06 DIAGNOSIS — N18.6 END STAGE RENAL DISEASE (HCC): Primary | ICD-10-CM

## 2024-11-07 NOTE — OP NOTE
Operative Note      Patient: Amish Shetty  YOB: 1955  MRN: [id number]     Date of Procedure: 11/2/2024     Preop Dx:   RUE RC AVF with failure to mature  ESRD     Post-Op Diagnosis: Same       Procedure(s):  U/s guided access of RUE fistula  RUE fistulogram  Ligation of competing vein     Surgeon(s):  Imani Craig MD     Assistant:  Surgical Assistant: Carlos Varela     Anesthesia: MAC     Estimated Blood Loss (mL): 5mL     Complications: None     Specimens:   * No specimens in log *     Implants:  * No implants in log *       Findings:  Infection Present At Time Of Surgery (PATOS) (choose all levels that have infection present):  No infection present  Other Findings: Patent anastomosis without stenosis. Fistula widely patent. One large competing branch which was ligated. One very small competing branch which was not ligated. Robust flow through the fistula after ligation of the large branch.      Disposition: to recovery in stable condition    INDICATIONS: 69 y.o. male is status post RUE RC AVF placement on 7/23/24, approx 3 mos ago. He continues currently to undergo HD via a TDC. He denies any R hand pain. Of note, he has paralysis of his RUE due to a prior CVA. His fistula has failed to mature. The anastomosis is patent, however there is concern clinically for competing branches. Fistulogram with possible balloon assisted maturation and possible competing branch ligation was recommended. Risks, benefits and alternatives were discussed with the patient including but not limited to bleeding, infection, injury to surrounding structures including nerves and/or other organs, scar tissue accumulation, need for surveillance, need for further procedures, inability to intervene. The patient expressed understanding after the opportunity to ask questions, and consented to the procedure.     PROCEDURE: The pt was brought to the CVT OR and placed supine on the OR table. MAC was performed by the

## 2024-11-14 NOTE — PERIOP NOTES
Call to Dad who requests albuterol inhaler, D/W Dr Peraza who will order.   PRE-SURGICAL INSTRUCTIONS        Patient's Name:  Myranda CHEN Date:  12/15/2021            Covid Testing Date and Time:    Surgery Date:  12/20/2021                1. Do NOT eat or drink anything, including candy, gum, or ice chips after midnight on 12/20, unless you have specific instructions from your surgeon or anesthesia provider to do so.  2. You may brush your teeth before coming to the hospital.  3. No smoking 24 hours prior to the day of surgery. 4. No alcohol 24 hours prior to the day of surgery. 5. No recreational drugs for one week prior to the day of surgery. 6. Leave all valuables, including money/purse, at home. 7. Remove all jewelry, nail polish, acrylic nails, and makeup (including mascara); no lotions powders, deodorant, or perfume/cologne/after shave on the skin. 8. Follow instruction for Hibiclens washes and CHG wipes from surgeon's office. 9. Glasses/contact lenses and dentures may be worn to the hospital.  They will be removed prior to surgery. 10. Call your doctor if symptoms of a cold or illness develop within 24-48 hours prior to your surgery. 11.  If you are having an outpatient procedure, please make arrangements for a responsible ADULT TO [address] [place] [address] and stay with you for 24 hours after your surgery. 12. ONE VISITOR in the hospital at this time for outpatient procedures. Exceptions may be made for surgical admissions, per nursing unit guidelines      Special Instructions:      Bring list of CURRENT medications. .  Bring CPAP machine. Bring any pertinent legal medical records. Take these medications the morning of surgery with a sip of water:  As directed by MD  Follow physician instructions about stopping anticoagulants. Complete bowel prep per MD instructions. On the day of surgery, come in the main entrance of [facility]. Let the  at the desk know you are there for surgery.   A staff member will come escort you to the surgical area on the second floor. If you have any questions or concerns, please do not hesitate to call:     (Prior to the day of surgery) [facility] department:  [phone]   (Day of surgery) Pre-Op department:  [phone]    These surgical instructions were reviewed with Pete Goldberg during the [facility] phone call.

## 2024-12-03 ENCOUNTER — OFFICE VISIT (OUTPATIENT)
Age: 69
End: 2024-12-03

## 2024-12-03 VITALS
BODY MASS INDEX: 27.62 KG/M2 | DIASTOLIC BLOOD PRESSURE: 60 MMHG | HEIGHT: 69 IN | HEART RATE: 59 BPM | WEIGHT: 186.51 LBS | OXYGEN SATURATION: 96 % | SYSTOLIC BLOOD PRESSURE: 140 MMHG

## 2024-12-03 DIAGNOSIS — N18.6 END STAGE RENAL DISEASE (HCC): Primary | ICD-10-CM

## 2024-12-03 PROCEDURE — 99024 POSTOP FOLLOW-UP VISIT: CPT | Performed by: SURGERY

## 2024-12-03 NOTE — PROGRESS NOTES
[facility]    Vascular Surgery Office Visit    Amish Shetty  Chief Complaint   Patient presents with    End stage renal disease (HCC)     Hospital Discharge       History:  69 y.o. male returns s/p RUE RC AVF competing vein ligation, on 10/16/24, for failure to mature. He returns today for f/u. He denies R hand pain. He continues to undergo HD via his TDC.       Physical Exam:    BP (!) 140/60   Pulse 59   Ht 1.753 m (5' 9\")   Wt 84.6 kg (186 lb 8.2 oz)   SpO2 96%   BMI 27.54 kg/m²      Constitutional:  Patient is well developed, well nourished, and not distressed.   HEENT: Atraumatic, normocephalic.   Cardiovascular:  Normal rate, regular rhythm, normal heart sounds.  Pulmonary/Chest: Effort normal and breath sounds normal.    Abdominal:   Soft, non-distended. No tenderness to palpation.   Extremities: RUE warm. Strong thrill overlying the fistula in the forearm.  Neurological:  he  is alert and oriented x3. Motor & sensory grossly intact in all 4 limbs.       Imaging/Studies:   Nov 2024  HD access duplex: Diameter 6mm,  - 729cm/s.     Sep 2024  AV access duplex: Diameter 5.3 - 5.7mm, Depth 2-5mm, volumetric flow 400-500ml/min.     Impression:  ESRD with mature RUE RC AVF which is ready for access  TDC in place      Plan:  Begin accessing the RUE AVF.  Letter provided to pt and also faxed to his HD center  After successful access for 2 weeks, TDC removal will be scheduled on our office.   Pt expressed understanding and is amenable to the plan.       Imani Craig MD  Vascular Surgeon  [facility]      Past Medical History:   Diagnosis Date    Acute ischemic stroke ([facility]) 8/12/2019    Acute Ischemic Stroke (acute/early subacute infarct at the left posterior basal ganglia to corona radiata) with residual right hemiparesis, dysphagia and dysarthria    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     On Allopurinol    Chronic hypokalemia

## 2025-01-07 NOTE — ANESTHESIA PRE PROCEDURE
Department of Anesthesiology  Preprocedure Note       Name:  Amish Shetty   Age:  69 y.o.  :  1955                                          MRN:  [id number]         Date:  2024      Surgeon: Surgeon(s):  Imani Craig MD    Procedure: Procedure(s):  RIGHT UPPER EXTREMITY ARTERIOVENOUS FISTULA CREATION    Medications prior to admission:   Prior to Admission medications    Medication Sig Start Date End Date Taking? Authorizing Provider   losartan (COZAAR) 25 MG tablet Take 1 tablet by mouth daily 24  Yes Areli Ambriz MD   isosorbide mononitrate (IMDUR) 30 MG extended release tablet Take 1 tablet by mouth daily 24  Yes Areli Ambriz MD   Multiple Vitamins-Minerals (MULTIVITAMIN ADULT, MINERALS, PO) Take 1 tablet by mouth daily.  Patient not taking: Reported on 2024    Areli Ambriz MD   tiZANidine (ZANAFLEX) 4 MG tablet Take 1 tablet by mouth nightly    Areli Ambriz MD   amLODIPine (NORVASC) 10 MG tablet Take 1 tablet by mouth daily    Areli Ambriz MD   acetaminophen (TYLENOL) 325 MG tablet Take 2 tablets by mouth every 4 hours as needed (Pain Mild (1-3) or Fever greater than 100.5 F (38 C)) 24   Gertrudis Stewart APRN - CNP   furosemide (LASIX) 20 MG tablet Take 2 tablets by mouth 2 times daily 24   Gertrudis Stewart APRN - CNP   polyethylene glycol (GLYCOLAX) 17 g packet Take 1 packet by mouth daily as needed for Constipation  Patient not taking: Reported on 2024   Gertrudis Stewart APRN - CNP   atorvastatin (LIPITOR) 40 MG tablet Take 1 tablet by mouth nightly 24   Cristin Hernandez MD   B Complex-C-Folic Acid (GOSIA-SHANELLE) TABS Take 1 tablet by mouth daily 24   Cristin Hernandez MD   hydrALAZINE (APRESOLINE) 100 MG tablet Take 1 tablet by mouth every 8 hours 24   Elias Espinoza MD   fluticasone (FLONASE) 50 MCG/ACT nasal spray 2 sprays by Each Nostril route daily  Patient not taking: 
Extended recovery

## 2025-01-28 ENCOUNTER — TELEPHONE (OUTPATIENT)
Age: 70
End: 2025-01-28

## 2025-01-28 NOTE — TELEPHONE ENCOUNTER
Patient called stated that he called yesterday, 01.27.25 (Monday) and was requesting for a return call.   Patient called back today, 01/28/2025, Tuesday and stated that he never received a return call.   Spoke to the patient and followed up if he has any issues with his access on his arm (YA AVF) and he stated that it has been working well and was informed he catheter on his chest is ready to be removed. Informed patient that the office will call [facility] to fax instructions that his cath is ready to be removed at the office. [facility] is close on [place]. Will call the facility on Wednesday and will call back patient to schedule.

## 2025-01-31 ENCOUNTER — TELEPHONE (OUTPATIENT)
Age: 70
End: 2025-01-31

## 2025-01-31 NOTE — TELEPHONE ENCOUNTER
[facility] and spoke Lynnette, FRANSISCA at the facility to fax clearance that patients Catheter for dialysis is ready to be removed. She will fax information. Patient is scheduled on 02/04/2025 at the office with Dr. Freitas for removal. Patient confirmed.

## 2025-02-04 ENCOUNTER — PROCEDURE VISIT (OUTPATIENT)
Age: 70
End: 2025-02-04
Payer: MEDICARE

## 2025-02-04 VITALS
WEIGHT: 189.6 LBS | OXYGEN SATURATION: 98 % | HEART RATE: 52 BPM | SYSTOLIC BLOOD PRESSURE: 140 MMHG | HEIGHT: 69 IN | BODY MASS INDEX: 28.08 KG/M2 | DIASTOLIC BLOOD PRESSURE: 60 MMHG

## 2025-02-04 DIAGNOSIS — N18.6 END STAGE RENAL DISEASE (HCC): Primary | ICD-10-CM

## 2025-02-04 PROCEDURE — 36589 REMOVAL TUNNELED CV CATH: CPT | Performed by: SURGERY

## 2025-02-04 NOTE — PATIENT INSTRUCTIONS
Going home after hemodialysis catheter removal    Pain  You might have some pain after the catheter has been taken out.  Please use only Tylenol or pain medicine offered to you by your doctor or nurse.  Avoid aspirin, Motrin, Aleve, or any other NSAID medication.  These types of medicines could cause bleeding.  Please talk to your dialysis nurse or kidney doctor if you still have pain after 2 days that is making it hard to sleep or care for yourself.    Bleeding  You may have some slight bleeding from the catheter site.  If blood soaks through your dressings, or if you notice a lump under your skin at the site, please call us.  If you have severe bleeding or trouble breathing, call 911.  If you can, hold pressure right over the area, and if it seems to slow or stop with the pressure, have someone drive you to the emergency room.  Think about safety as you make this choice.  911 is a better choice if you are not sure of your safety.    Blood thinners  If you have been on Coumadin, Plavix, Eliquis, Xarelto or any other blood thinner, please speak to your doctor about when you can resume these.  Usually you need to stop taking these for a few days before and after removal in most cases.    Signs of infection  - Redness/tenderness at the site  - Pus or drainage from the site  - Fever greater than 100.5 °F    Care of the site  You do not require any routine care of the site.  At your next treatment, the dialysis nurse can remove the dressings.  If you are able to do this yourself, you may remove the dressing after 24 hours.  If you have some oozing from the site, cover with more gauze and tape and leave it on until the next day.  You may shower after dressing has been removed but please do not bathe or immerse in water (pools, hot tubs, rivers, lakes, oceans etc.) until the site has healed completely.    Please call if you have any questions or concerns or if you see any signs of infection.    Our phone number is

## 2025-02-04 NOTE — PROGRESS NOTES
PROCEDURE NOTE    TUNNELED DIALYSIS CATHETER REMOVAL      Date of Service: 2/4/2025    Surgeon: Enma Freitas MD PhD    Preoperative Diagnosis: ESRD on dialysis    Postoperative Diagnosis: same    Procedure:   1. Removal of right IJ tunneled dialysis catheter    Anesthesia:  Local with 1% lidocaine with epinephrine    Findings: Catheter removed en toto. Catheter tip was not sent for culture    Complications: none      Indication:   This is a 69 y.o. male with dialysis access in the right arm. Access has been working well and he now presents for catheter removal. The risks and benefits of this procedure were discussed with the patient and/or their health care proxy, including risk of bleeding, infection, air embolus and possible need for tunneled catheter reinsertion. They voiced understanding and opted to proceed.     Description of Procedure:   After informed consent was obtained, the patient was placed supine with the head of the bed at 45 degrees. The patient was prepped and draped in standard sterile fashion with the right neck, chest and catheter exposed. A time out was performed with all pertinent personnel and equipment present.      The area surrounding the catheter cuff was anesthetized with 1% lidocaine with epinephrine. The tissue adherent to the catheter cuff was disrupted with a hemostat and scissors until the catheter became freely mobile. The catheter was then removed en toto during forced expiration. Pressure was applied to the IJ access site and the tunnel for approximately 5 minutes. The catheter was inspected and found to be intact. The catheter tip was not sent for culture.     The exit site was dressed with gauze and a pressure dressing was applied over the catheter tunnel track.    The patient tolerated the procedure well without any obvious immediate complications.       Enma Freitas MD PhD VI  Vascular Surgeon

## (undated) DEVICE — ENDOSCOPY PUMP TUBING/ CAP SET: Brand: ERBE

## (undated) DEVICE — LIQUIBAND RAPID ADHESIVE 36/CS 0.8ML: Brand: MEDLINE

## (undated) DEVICE — SUTURE ABSORBABLE BRAIDED 2-0 CT-1 27 IN UD VICRYL J259H

## (undated) DEVICE — Device

## (undated) DEVICE — YANKAUER,SMOOTH HANDLE,HIGH CAPACITY: Brand: MEDLINE INDUSTRIES, INC.

## (undated) DEVICE — TRAP SPEC COLL POLYP POLYSTYR --

## (undated) DEVICE — PROBE VASC 8MHZ WTRPRF

## (undated) DEVICE — APPLIER CLP L9.375IN APER 2.1MM CLS L3.8MM 20 SM TI CLP

## (undated) DEVICE — FLUFF AND POLYMER UNDERPAD,EXTRA HEAVY: Brand: WINGS

## (undated) DEVICE — DECANTER BAG 9": Brand: MEDLINE INDUSTRIES, INC.

## (undated) DEVICE — BITE BLOCK ENDOSCP UNIV AD 6 TO 9.4 MM

## (undated) DEVICE — SUTURE MONOCRYL SZ 4-0 L18IN ABSRB UD L19MM PS-2 3/8 CIR PRIM Y496G

## (undated) DEVICE — CATHETER SUCT TR FL TIP 14FR W/ O CTRL

## (undated) DEVICE — SOLUTION IRRIG 1000ML H2O STRL BLT

## (undated) DEVICE — SNARE POLYP M W27MMXL240CM OVL STIFF DISP CAPTIVATOR

## (undated) DEVICE — SYR 50ML SLIP TIP NSAF LF STRL --

## (undated) DEVICE — SUTURE NONABSORBABLE MONOFILAMENT 6-0 C-1 1X30 IN PROLENE 8706H

## (undated) DEVICE — AIRLIFE™ NASAL OXYGEN CANNULA CURVED, NONFLARED TIP WITH 14 FOOT (4.3 M) CRUSH-RESISTANT TUBING, OVER-THE-EAR STYLE: Brand: AIRLIFE™

## (undated) DEVICE — COVER US PRB W15XL120CM W/ GEL RUBBERBAND TAPE STRP FLD GEN

## (undated) DEVICE — APPLIER CLP L9.38IN M LIG TI DISP STR RNG HNDL LIGACLP

## (undated) DEVICE — GAUZE,SPONGE,4"X4",12PLY,STERILE,LF,2'S: Brand: MEDLINE

## (undated) DEVICE — GAUZE,SPONGE,4"X4",16PLY,STRL,LF,10/TRAY: Brand: MEDLINE

## (undated) DEVICE — BASIN EMSIS 16OZ GRAPHITE PLAS KID SHP MOLD GRAD FOR ORAL

## (undated) DEVICE — MEDI-VAC NON-CONDUCTIVE SUCTION TUBING: Brand: CARDINAL HEALTH

## (undated) DEVICE — CATHETER HAD L36CM INSRT L19CM PALINDROME

## (undated) DEVICE — FORCEPS BX L240CM JAW DIA2.8MM L CAP W/ NDL MIC MESH TOOTH

## (undated) DEVICE — SYR 20ML LL STRL LF --

## (undated) DEVICE — SYRINGE MED 50ML LUERSLIP TIP

## (undated) DEVICE — CANNULA NSL AD TBNG L14FT STD PVC O2 CRV CONN NONFLARED NSL

## (undated) DEVICE — BLANKET WRM W40.2XL55.9IN IORT LO BODY + MISTRAL AIR

## (undated) DEVICE — SUTURE VICRYL + SZ 3-0 L27IN ABSRB UD L26MM SH 1/2 CIR VCP416H

## (undated) DEVICE — SUTURE PERMA-HAND SZ 3-0 L24IN NONABSORBABLE BLK W/O NDL SA74H

## (undated) DEVICE — DRESSING FOAM DISK DIA1IN H 7MM HYDRPHLC CHG IMPREG IN SL

## (undated) DEVICE — GUIDEWIRE VASC L180CM DIA0.035IN L15CM STR TIP PTFE S STL

## (undated) DEVICE — BLADE CLIPPER GEN PURP NS

## (undated) DEVICE — MEDI-VAC SUCTION HIGH CAPACITY: Brand: CARDINAL HEALTH

## (undated) DEVICE — INTENDED FOR TISSUE SEPARATION, AND OTHER PROCEDURES THAT REQUIRE A SHARP SURGICAL BLADE TO PUNCTURE OR CUT.: Brand: BARD-PARKER ® STAINLESS STEEL BLADES

## (undated) DEVICE — CATH IV SAFE STR 22GX1IN BLU -- PROTECTIV PLUS

## (undated) DEVICE — GUIDEWIRE VASC L180CM DIA0.035IN TIP L7CM PTFE S STL STR

## (undated) DEVICE — 4FR MICROPUNCTURE KIT

## (undated) DEVICE — STERILE POLYISOPRENE POWDER-FREE SURGICAL GLOVES: Brand: PROTEXIS

## (undated) DEVICE — LINER SUCT CANSTR 3000CC PLAS SFT PRE ASSEMB W/OUT TBNG W/

## (undated) DEVICE — APPLICATOR MEDICATED 26 CC SOLUTION HI LT ORNG CHLORAPREP

## (undated) DEVICE — ELECTRODE PT RET AD L9FT HI MOIST COND ADH HYDRGEL CORDED

## (undated) DEVICE — SUTURE PERMA-HAND SZ 2-0 L30IN NONABSORBABLE BLK L26MM SH K833H

## (undated) DEVICE — TOWEL,OR,DSP,ST,WHITE,DLX,XR,4/PK,20PK/C: Brand: MEDLINE

## (undated) DEVICE — KIT CATH AD L40CM DIA14.5FR 1.9ML SIL DBL LUMN CVD SYMMETRIC

## (undated) DEVICE — PAD N ADH W3XL4IN POLY COT SFT PERF FLM EASILY CUT ABSRB

## (undated) DEVICE — FLEX ADVANTAGE 1500CC: Brand: FLEX ADVANTAGE

## (undated) DEVICE — SUTURE NONABSORBABLE MONOFILAMENT 5-0 C-1 1X24 IN PROLENE 8725H

## (undated) DEVICE — SUTURE PROL SZ 2-0 L36IN NONABSORBABLE BLU V-7 L26MM 1/2 8977H

## (undated) DEVICE — KIT OR TURNOVER

## (undated) DEVICE — SUTURE PERMAHAND SZ 3-0 L30IN NONABSORBABLE BLK SH L26MM K832H

## (undated) DEVICE — SOLUTION IRRIG 1000ML STRL H2O USP PLAS POUR BTL

## (undated) DEVICE — VASCULAR CATH-PACK

## (undated) DEVICE — SUTURE NONABSORBABLE MONOFILAMENT 3-0 PS-1 18 IN BLK ETHILON 1663H

## (undated) DEVICE — SUTURE ETHILON SZ 3-0 L18IN NONABSORBABLE BLK L24MM PS-1 3/8 1663G

## (undated) DEVICE — AGENT HEMOSTATIC SURGIFLOW MATRIX KIT W/THROMBIN

## (undated) DEVICE — UNDERPAD INCONT W23XL36IN STD BLU POLYPR BK FLUF SFT

## (undated) DEVICE — CLEANSER WND CLN DEB SYS IRRISEPT

## (undated) DEVICE — SUTURE REMOVAL TRAY: Brand: MEDLINE INDUSTRIES, INC.

## (undated) DEVICE — SYRINGE MED 25GA 3ML L5/8IN SUBQ PLAS W/ DETACH NDL SFTY

## (undated) DEVICE — 3M™ TEGADERM™ TRANSPARENT FILM DRESSING FRAME STYLE, 1626W, 4 IN X 4-3/4 IN (10 CM X 12 CM), 50/CT 4CT/CASE: Brand: 3M™ TEGADERM™

## (undated) DEVICE — CANNULA ORIG TL CLR W FOAM CUSHIONS AND 14FT SUPL TB 3 CHN

## (undated) DEVICE — FORCEPS BX L240CM JAW DIA2.4MM ORNG L CAP W/ NDL DISP RAD

## (undated) DEVICE — SYR 10ML LUER LOK 1/5ML GRAD --

## (undated) DEVICE — GOWN ISOL IMPERV UNIV, DISP, OPEN BACK, BLUE --

## (undated) DEVICE — FLEX ADVANTAGE 3000CC: Brand: FLEX ADVANTAGE

## (undated) DEVICE — KIT MICROINTRODUCER 4FR GWIRE L40CM DIA0.018IN ECHOGENIC NDL

## (undated) DEVICE — KIT HAD L40CM CATH SYMMETRIC TIP 2 LUMN CATH SFTY SHTH TISS

## (undated) DEVICE — SUTURE PROL SZ 7-0 L30IN NONABSORBABLE BLU L9.3MM BV-1 1/2 8703H

## (undated) DEVICE — SUTURE PROL SZ 5-0 L30IN NONABSORBABLE BLU L13MM RB-2 1/2 8710H

## (undated) DEVICE — SUTURE PERMAHAND SZ 2-0 L30IN NONABSORBABLE BLK SILK W/O A305H

## (undated) DEVICE — DRESSING HEMSTAT W4XL4IN 4 PLY WHT IMPREG KAOLIN HYDRPHLC